# Patient Record
Sex: MALE | Race: WHITE | NOT HISPANIC OR LATINO | Employment: OTHER | URBAN - METROPOLITAN AREA
[De-identification: names, ages, dates, MRNs, and addresses within clinical notes are randomized per-mention and may not be internally consistent; named-entity substitution may affect disease eponyms.]

---

## 2017-01-11 ENCOUNTER — GENERIC CONVERSION - ENCOUNTER (OUTPATIENT)
Dept: OTHER | Facility: OTHER | Age: 72
End: 2017-01-11

## 2017-01-18 ENCOUNTER — HOSPITAL ENCOUNTER (OUTPATIENT)
Dept: RADIOLOGY | Facility: HOSPITAL | Age: 72
Discharge: HOME | End: 2017-01-18
Payer: MEDICARE

## 2017-01-18 DIAGNOSIS — Z12.2 SCREENING FOR MALIGNANT NEOPLASM OF RESPIRATORY ORGAN: ICD-10-CM

## 2017-01-20 DIAGNOSIS — R26.89 OTHER ABNORMALITIES OF GAIT AND MOBILITY: ICD-10-CM

## 2017-01-23 ENCOUNTER — APPOINTMENT (OUTPATIENT)
Dept: PHYSICAL THERAPY | Facility: CLINIC | Age: 72
End: 2017-01-23
Payer: MEDICARE

## 2017-01-23 PROCEDURE — 97162 PT EVAL MOD COMPLEX 30 MIN: CPT

## 2017-01-23 PROCEDURE — G8979 MOBILITY GOAL STATUS: HCPCS

## 2017-01-23 PROCEDURE — G8978 MOBILITY CURRENT STATUS: HCPCS

## 2017-01-25 ENCOUNTER — GENERIC CONVERSION - ENCOUNTER (OUTPATIENT)
Dept: OTHER | Facility: OTHER | Age: 72
End: 2017-01-25

## 2017-01-26 ENCOUNTER — APPOINTMENT (OUTPATIENT)
Dept: PHYSICAL THERAPY | Facility: CLINIC | Age: 72
End: 2017-01-26
Payer: MEDICARE

## 2017-01-26 PROCEDURE — 97112 NEUROMUSCULAR REEDUCATION: CPT

## 2017-01-26 PROCEDURE — 97110 THERAPEUTIC EXERCISES: CPT

## 2017-01-31 ENCOUNTER — APPOINTMENT (OUTPATIENT)
Dept: PHYSICAL THERAPY | Facility: CLINIC | Age: 72
End: 2017-01-31
Payer: MEDICARE

## 2017-01-31 PROCEDURE — 97110 THERAPEUTIC EXERCISES: CPT

## 2017-01-31 PROCEDURE — 97112 NEUROMUSCULAR REEDUCATION: CPT

## 2017-02-02 ENCOUNTER — APPOINTMENT (OUTPATIENT)
Dept: PHYSICAL THERAPY | Facility: CLINIC | Age: 72
End: 2017-02-02
Payer: MEDICARE

## 2017-02-02 PROCEDURE — 97112 NEUROMUSCULAR REEDUCATION: CPT

## 2017-02-02 PROCEDURE — 97110 THERAPEUTIC EXERCISES: CPT

## 2017-02-07 ENCOUNTER — APPOINTMENT (OUTPATIENT)
Dept: PHYSICAL THERAPY | Facility: CLINIC | Age: 72
End: 2017-02-07
Payer: MEDICARE

## 2017-02-07 PROCEDURE — 97110 THERAPEUTIC EXERCISES: CPT

## 2017-02-07 PROCEDURE — 97112 NEUROMUSCULAR REEDUCATION: CPT

## 2017-02-09 ENCOUNTER — APPOINTMENT (OUTPATIENT)
Dept: PHYSICAL THERAPY | Facility: CLINIC | Age: 72
End: 2017-02-09
Payer: MEDICARE

## 2017-02-09 PROCEDURE — 97112 NEUROMUSCULAR REEDUCATION: CPT

## 2017-02-09 PROCEDURE — 97110 THERAPEUTIC EXERCISES: CPT

## 2017-02-14 ENCOUNTER — APPOINTMENT (OUTPATIENT)
Dept: PHYSICAL THERAPY | Facility: CLINIC | Age: 72
End: 2017-02-14
Payer: MEDICARE

## 2017-02-14 PROCEDURE — 97112 NEUROMUSCULAR REEDUCATION: CPT

## 2017-02-14 PROCEDURE — 97110 THERAPEUTIC EXERCISES: CPT

## 2017-02-16 ENCOUNTER — APPOINTMENT (OUTPATIENT)
Dept: PHYSICAL THERAPY | Facility: CLINIC | Age: 72
End: 2017-02-16
Payer: MEDICARE

## 2017-02-16 PROCEDURE — 97112 NEUROMUSCULAR REEDUCATION: CPT

## 2017-02-16 PROCEDURE — G8979 MOBILITY GOAL STATUS: HCPCS

## 2017-02-16 PROCEDURE — G8978 MOBILITY CURRENT STATUS: HCPCS

## 2017-02-16 PROCEDURE — 97110 THERAPEUTIC EXERCISES: CPT

## 2017-02-21 ENCOUNTER — APPOINTMENT (OUTPATIENT)
Dept: PHYSICAL THERAPY | Facility: CLINIC | Age: 72
End: 2017-02-21
Payer: MEDICARE

## 2017-02-23 ENCOUNTER — APPOINTMENT (OUTPATIENT)
Dept: PHYSICAL THERAPY | Facility: CLINIC | Age: 72
End: 2017-02-23
Payer: MEDICARE

## 2017-02-23 PROCEDURE — G8979 MOBILITY GOAL STATUS: HCPCS

## 2017-02-23 PROCEDURE — 97110 THERAPEUTIC EXERCISES: CPT

## 2017-02-23 PROCEDURE — G8980 MOBILITY D/C STATUS: HCPCS

## 2017-02-23 PROCEDURE — 97112 NEUROMUSCULAR REEDUCATION: CPT

## 2017-03-08 RX ORDER — AZELASTINE 1 MG/ML
1 SPRAY, METERED NASAL 2 TIMES DAILY
COMMUNITY
End: 2018-02-14

## 2017-03-08 RX ORDER — AMLODIPINE BESYLATE 5 MG/1
5 TABLET ORAL EVERY MORNING
COMMUNITY
End: 2018-02-01 | Stop reason: SDUPTHER

## 2017-03-08 RX ORDER — TAMSULOSIN HYDROCHLORIDE 0.4 MG/1
0.4 CAPSULE ORAL
COMMUNITY
End: 2018-07-28 | Stop reason: SDUPTHER

## 2017-03-08 RX ORDER — ALBUTEROL SULFATE 90 UG/1
2 AEROSOL, METERED RESPIRATORY (INHALATION) EVERY 6 HOURS PRN
COMMUNITY
End: 2018-01-31 | Stop reason: SDUPTHER

## 2017-03-09 ENCOUNTER — ANESTHESIA EVENT (OUTPATIENT)
Dept: GASTROENTEROLOGY | Facility: AMBULARY SURGERY CENTER | Age: 72
End: 2017-03-09
Payer: MEDICARE

## 2017-03-09 RX ORDER — SODIUM CHLORIDE, SODIUM LACTATE, POTASSIUM CHLORIDE, CALCIUM CHLORIDE 600; 310; 30; 20 MG/100ML; MG/100ML; MG/100ML; MG/100ML
125 INJECTION, SOLUTION INTRAVENOUS CONTINUOUS
Status: CANCELLED | OUTPATIENT
Start: 2017-03-09

## 2017-03-10 ENCOUNTER — ANESTHESIA (OUTPATIENT)
Dept: GASTROENTEROLOGY | Facility: AMBULARY SURGERY CENTER | Age: 72
End: 2017-03-10
Payer: MEDICARE

## 2017-03-10 ENCOUNTER — HOSPITAL ENCOUNTER (OUTPATIENT)
Facility: AMBULARY SURGERY CENTER | Age: 72
Setting detail: OUTPATIENT SURGERY
Discharge: HOME | End: 2017-03-10
Attending: INTERNAL MEDICINE | Admitting: INTERNAL MEDICINE
Payer: MEDICARE

## 2017-03-10 ENCOUNTER — GENERIC CONVERSION - ENCOUNTER (OUTPATIENT)
Dept: OTHER | Facility: OTHER | Age: 72
End: 2017-03-10

## 2017-03-10 VITALS
WEIGHT: 175 LBS | RESPIRATION RATE: 18 BRPM | TEMPERATURE: 97.3 F | SYSTOLIC BLOOD PRESSURE: 144 MMHG | DIASTOLIC BLOOD PRESSURE: 77 MMHG | OXYGEN SATURATION: 96 % | BODY MASS INDEX: 20.66 KG/M2 | HEIGHT: 77 IN | HEART RATE: 61 BPM

## 2017-03-10 DIAGNOSIS — K63.5 POLYP OF COLON: ICD-10-CM

## 2017-03-10 DIAGNOSIS — K21.9 GASTRO-ESOPHAGEAL REFLUX DISEASE WITHOUT ESOPHAGITIS: ICD-10-CM

## 2017-03-10 PROCEDURE — 88305 TISSUE EXAM BY PATHOLOGIST: CPT | Performed by: INTERNAL MEDICINE

## 2017-03-10 RX ORDER — LIDOCAINE HYDROCHLORIDE 20 MG/ML
INJECTION, SOLUTION EPIDURAL; INFILTRATION; INTRACAUDAL; PERINEURAL AS NEEDED
Status: DISCONTINUED | OUTPATIENT
Start: 2017-03-10 | End: 2017-03-10 | Stop reason: SURG

## 2017-03-10 RX ORDER — PROPOFOL 10 MG/ML
INJECTION, EMULSION INTRAVENOUS AS NEEDED
Status: DISCONTINUED | OUTPATIENT
Start: 2017-03-10 | End: 2017-03-10 | Stop reason: SURG

## 2017-03-10 RX ORDER — SODIUM CHLORIDE, SODIUM LACTATE, POTASSIUM CHLORIDE, CALCIUM CHLORIDE 600; 310; 30; 20 MG/100ML; MG/100ML; MG/100ML; MG/100ML
75 INJECTION, SOLUTION INTRAVENOUS CONTINUOUS
Status: DISCONTINUED | OUTPATIENT
Start: 2017-03-10 | End: 2017-03-10 | Stop reason: SDUPTHER

## 2017-03-10 RX ORDER — SODIUM CHLORIDE, SODIUM LACTATE, POTASSIUM CHLORIDE, CALCIUM CHLORIDE 600; 310; 30; 20 MG/100ML; MG/100ML; MG/100ML; MG/100ML
100 INJECTION, SOLUTION INTRAVENOUS CONTINUOUS
Status: DISCONTINUED | OUTPATIENT
Start: 2017-03-10 | End: 2017-03-10 | Stop reason: HOSPADM

## 2017-03-10 RX ADMIN — PROPOFOL 20 MG: 10 INJECTION, EMULSION INTRAVENOUS at 10:17

## 2017-03-10 RX ADMIN — PROPOFOL 20 MG: 10 INJECTION, EMULSION INTRAVENOUS at 10:23

## 2017-03-10 RX ADMIN — LIDOCAINE HYDROCHLORIDE 60 MG: 20 INJECTION, SOLUTION EPIDURAL; INFILTRATION; INTRACAUDAL; PERINEURAL at 10:04

## 2017-03-10 RX ADMIN — PROPOFOL 20 MG: 10 INJECTION, EMULSION INTRAVENOUS at 10:06

## 2017-03-10 RX ADMIN — PROPOFOL 120 MG: 10 INJECTION, EMULSION INTRAVENOUS at 10:04

## 2017-03-10 RX ADMIN — PROPOFOL 20 MG: 10 INJECTION, EMULSION INTRAVENOUS at 10:11

## 2017-03-10 RX ADMIN — PROPOFOL 20 MG: 10 INJECTION, EMULSION INTRAVENOUS at 10:15

## 2017-03-10 RX ADMIN — PROPOFOL 20 MG: 10 INJECTION, EMULSION INTRAVENOUS at 10:18

## 2017-03-10 RX ADMIN — PROPOFOL 20 MG: 10 INJECTION, EMULSION INTRAVENOUS at 10:20

## 2017-03-10 RX ADMIN — PROPOFOL 20 MG: 10 INJECTION, EMULSION INTRAVENOUS at 10:09

## 2017-03-10 RX ADMIN — SODIUM CHLORIDE, SODIUM LACTATE, POTASSIUM CHLORIDE, AND CALCIUM CHLORIDE 75 ML/HR: .6; .31; .03; .02 INJECTION, SOLUTION INTRAVENOUS at 09:57

## 2017-03-10 RX ADMIN — PROPOFOL 20 MG: 10 INJECTION, EMULSION INTRAVENOUS at 10:22

## 2017-03-10 RX ADMIN — PROPOFOL 20 MG: 10 INJECTION, EMULSION INTRAVENOUS at 10:13

## 2017-03-10 RX ADMIN — PROPOFOL 20 MG: 10 INJECTION, EMULSION INTRAVENOUS at 10:12

## 2017-03-10 RX ADMIN — PROPOFOL 20 MG: 10 INJECTION, EMULSION INTRAVENOUS at 10:07

## 2017-03-19 ENCOUNTER — GENERIC CONVERSION - ENCOUNTER (OUTPATIENT)
Dept: OTHER | Facility: OTHER | Age: 72
End: 2017-03-19

## 2017-03-21 ENCOUNTER — ALLSCRIPTS OFFICE VISIT (OUTPATIENT)
Dept: OTHER | Facility: OTHER | Age: 72
End: 2017-03-21

## 2017-04-25 ENCOUNTER — ALLSCRIPTS OFFICE VISIT (OUTPATIENT)
Dept: OTHER | Facility: OTHER | Age: 72
End: 2017-04-25

## 2017-04-28 ENCOUNTER — ALLSCRIPTS OFFICE VISIT (OUTPATIENT)
Dept: OTHER | Facility: OTHER | Age: 72
End: 2017-04-28

## 2017-05-09 ENCOUNTER — ALLSCRIPTS OFFICE VISIT (OUTPATIENT)
Dept: OTHER | Facility: OTHER | Age: 72
End: 2017-05-09

## 2017-06-28 ENCOUNTER — HOSPITAL ENCOUNTER (OUTPATIENT)
Dept: ULTRASOUND IMAGING | Facility: HOSPITAL | Age: 72
Discharge: HOME | End: 2017-06-28
Payer: MEDICARE

## 2017-06-28 ENCOUNTER — TRANSCRIBE ORDERS (OUTPATIENT)
Dept: ADMINISTRATIVE | Facility: HOSPITAL | Age: 72
End: 2017-06-28

## 2017-06-28 DIAGNOSIS — I82.401 ACUTE DEEP VEIN THROMBOSIS (DVT) OF RIGHT LOWER EXTREMITY, UNSPECIFIED VEIN (HCC): Primary | ICD-10-CM

## 2017-06-28 DIAGNOSIS — I82.401 ACUTE DEEP VEIN THROMBOSIS (DVT) OF RIGHT LOWER EXTREMITY, UNSPECIFIED VEIN (HCC): ICD-10-CM

## 2017-06-28 PROCEDURE — 93971 EXTREMITY STUDY: CPT

## 2017-06-29 ENCOUNTER — ALLSCRIPTS OFFICE VISIT (OUTPATIENT)
Dept: OTHER | Facility: OTHER | Age: 72
End: 2017-06-29

## 2017-07-18 ENCOUNTER — APPOINTMENT (OUTPATIENT)
Dept: LAB | Facility: HOSPITAL | Age: 72
End: 2017-07-18
Payer: MEDICARE

## 2017-07-18 ENCOUNTER — HOSPITAL ENCOUNTER (OUTPATIENT)
Dept: RADIOLOGY | Facility: HOSPITAL | Age: 72
Discharge: HOME | End: 2017-07-18
Payer: MEDICARE

## 2017-07-18 ENCOUNTER — TRANSCRIBE ORDERS (OUTPATIENT)
Dept: ADMINISTRATIVE | Facility: HOSPITAL | Age: 72
End: 2017-07-18

## 2017-07-18 ENCOUNTER — ALLSCRIPTS OFFICE VISIT (OUTPATIENT)
Dept: OTHER | Facility: OTHER | Age: 72
End: 2017-07-18

## 2017-07-18 DIAGNOSIS — R01.1 CARDIAC MURMUR: ICD-10-CM

## 2017-07-18 DIAGNOSIS — L03.119 CELLULITIS OF UPPER ARM: Primary | ICD-10-CM

## 2017-07-18 DIAGNOSIS — L03.119 CELLULITIS OF EXTREMITY: ICD-10-CM

## 2017-07-18 DIAGNOSIS — L03.119 CELLULITIS OF UPPER ARM: ICD-10-CM

## 2017-07-18 LAB
ANION GAP SERPL CALCULATED.3IONS-SCNC: 8 MMOL/L (ref 4–13)
BUN SERPL-MCNC: 13 MG/DL (ref 5–25)
CALCIUM SERPL-MCNC: 9.3 MG/DL (ref 8.3–10.1)
CHLORIDE SERPL-SCNC: 106 MMOL/L (ref 100–108)
CO2 SERPL-SCNC: 27 MMOL/L (ref 21–32)
CREAT SERPL-MCNC: 1.28 MG/DL (ref 0.6–1.3)
ERYTHROCYTE [DISTWIDTH] IN BLOOD BY AUTOMATED COUNT: 13.7 % (ref 11.6–15.1)
ERYTHROCYTE [SEDIMENTATION RATE] IN BLOOD: 7 MM/HOUR (ref 2–10)
GFR SERPL CREATININE-BSD FRML MDRD: 55.1 ML/MIN/1.73SQ M
GLUCOSE P FAST SERPL-MCNC: 106 MG/DL (ref 65–99)
HCT VFR BLD AUTO: 42 % (ref 42–52)
HGB BLD-MCNC: 14.4 G/DL (ref 14–18)
MCH RBC QN AUTO: 32.9 PG (ref 27–31)
MCHC RBC AUTO-ENTMCNC: 34.2 G/DL (ref 31.4–37.4)
MCV RBC AUTO: 96 FL (ref 82–98)
PLATELET # BLD AUTO: 239 THOUSANDS/UL (ref 130–400)
PMV BLD AUTO: 6 FL (ref 8.9–12.7)
POTASSIUM SERPL-SCNC: 3.7 MMOL/L (ref 3.5–5.3)
RBC # BLD AUTO: 4.37 MILLION/UL (ref 4.7–6.1)
SODIUM SERPL-SCNC: 141 MMOL/L (ref 136–145)
URATE SERPL-MCNC: 6.8 MG/DL (ref 4.2–8)
WBC # BLD AUTO: 8 THOUSAND/UL (ref 4.8–10.8)

## 2017-07-18 PROCEDURE — 80048 BASIC METABOLIC PNL TOTAL CA: CPT

## 2017-07-18 PROCEDURE — 85652 RBC SED RATE AUTOMATED: CPT

## 2017-07-18 PROCEDURE — 84550 ASSAY OF BLOOD/URIC ACID: CPT

## 2017-07-18 PROCEDURE — 73610 X-RAY EXAM OF ANKLE: CPT

## 2017-07-18 PROCEDURE — 36415 COLL VENOUS BLD VENIPUNCTURE: CPT

## 2017-07-18 PROCEDURE — 73630 X-RAY EXAM OF FOOT: CPT

## 2017-07-18 PROCEDURE — 85027 COMPLETE CBC AUTOMATED: CPT

## 2017-07-24 ENCOUNTER — TRANSCRIBE ORDERS (OUTPATIENT)
Dept: ADMINISTRATIVE | Facility: HOSPITAL | Age: 72
End: 2017-07-24

## 2017-07-24 ENCOUNTER — ALLSCRIPTS OFFICE VISIT (OUTPATIENT)
Dept: OTHER | Facility: OTHER | Age: 72
End: 2017-07-24

## 2017-07-24 DIAGNOSIS — R01.1 MURMUR: Primary | ICD-10-CM

## 2017-07-26 ENCOUNTER — GENERIC CONVERSION - ENCOUNTER (OUTPATIENT)
Dept: OTHER | Facility: OTHER | Age: 72
End: 2017-07-26

## 2017-07-31 ENCOUNTER — HOSPITAL ENCOUNTER (OUTPATIENT)
Dept: NON INVASIVE DIAGNOSTICS | Facility: HOSPITAL | Age: 72
Discharge: HOME | End: 2017-07-31
Payer: MEDICARE

## 2017-07-31 DIAGNOSIS — R01.1 CARDIAC MURMUR: ICD-10-CM

## 2017-07-31 PROCEDURE — 93306 TTE W/DOPPLER COMPLETE: CPT

## 2017-08-08 ENCOUNTER — ALLSCRIPTS OFFICE VISIT (OUTPATIENT)
Dept: OTHER | Facility: OTHER | Age: 72
End: 2017-08-08

## 2017-10-26 ENCOUNTER — GENERIC CONVERSION - ENCOUNTER (OUTPATIENT)
Dept: OTHER | Facility: OTHER | Age: 72
End: 2017-10-26

## 2017-11-09 ENCOUNTER — GENERIC CONVERSION - ENCOUNTER (OUTPATIENT)
Dept: OTHER | Facility: OTHER | Age: 72
End: 2017-11-09

## 2017-11-22 ENCOUNTER — GENERIC CONVERSION - ENCOUNTER (OUTPATIENT)
Dept: OTHER | Facility: OTHER | Age: 72
End: 2017-11-22

## 2017-12-14 ENCOUNTER — TRANSCRIBE ORDERS (OUTPATIENT)
Dept: ADMINISTRATIVE | Facility: HOSPITAL | Age: 72
End: 2017-12-14

## 2017-12-14 DIAGNOSIS — Z12.2 SCREENING FOR MALIGNANT NEOPLASM OF RESPIRATORY ORGAN: Primary | ICD-10-CM

## 2017-12-29 ENCOUNTER — HOSPITAL ENCOUNTER (INPATIENT)
Facility: HOSPITAL | Age: 72
LOS: 4 days | Discharge: HOME | DRG: 190 | End: 2018-01-03
Attending: EMERGENCY MEDICINE | Admitting: FAMILY MEDICINE
Payer: MEDICARE

## 2017-12-29 ENCOUNTER — APPOINTMENT (EMERGENCY)
Dept: RADIOLOGY | Facility: HOSPITAL | Age: 72
DRG: 190 | End: 2017-12-29
Payer: MEDICARE

## 2017-12-29 DIAGNOSIS — B33.8 RSV INFECTION: ICD-10-CM

## 2017-12-29 DIAGNOSIS — R14.0 ABDOMINAL DISTENSION: ICD-10-CM

## 2017-12-29 DIAGNOSIS — E88.01 AAT (ALPHA-1-ANTITRYPSIN) DEFICIENCY (HCC): ICD-10-CM

## 2017-12-29 DIAGNOSIS — J44.1 COPD EXACERBATION (HCC): Primary | ICD-10-CM

## 2017-12-29 LAB
ALBUMIN SERPL BCP-MCNC: 3.5 G/DL (ref 3.5–5)
ALP SERPL-CCNC: 76 U/L (ref 46–116)
ALT SERPL W P-5'-P-CCNC: 32 U/L (ref 12–78)
ANION GAP SERPL CALCULATED.3IONS-SCNC: 9 MMOL/L (ref 4–13)
APTT PPP: 25 SECONDS (ref 24–33)
AST SERPL W P-5'-P-CCNC: 29 U/L (ref 5–45)
ATRIAL RATE: 90 BPM
BASOPHILS # BLD AUTO: 0 THOUSANDS/ΜL (ref 0–0.1)
BASOPHILS NFR BLD AUTO: 0 % (ref 0–1)
BILIRUB SERPL-MCNC: 1.5 MG/DL (ref 0.2–1)
BUN SERPL-MCNC: 14 MG/DL (ref 5–25)
CALCIUM SERPL-MCNC: 8.9 MG/DL (ref 8.3–10.1)
CHLORIDE SERPL-SCNC: 104 MMOL/L (ref 100–108)
CO2 SERPL-SCNC: 24 MMOL/L (ref 21–32)
CREAT SERPL-MCNC: 1.39 MG/DL (ref 0.6–1.3)
EOSINOPHIL # BLD AUTO: 0.3 THOUSAND/ΜL (ref 0–0.61)
EOSINOPHIL NFR BLD AUTO: 4 % (ref 0–6)
ERYTHROCYTE [DISTWIDTH] IN BLOOD BY AUTOMATED COUNT: 13.6 % (ref 11.6–15.1)
GFR SERPL CREATININE-BSD FRML MDRD: 50 ML/MIN/1.73SQ M
GLUCOSE SERPL-MCNC: 118 MG/DL (ref 65–140)
HCT VFR BLD AUTO: 41.8 % (ref 42–52)
HGB BLD-MCNC: 14.4 G/DL (ref 14–18)
INR PPP: 1.07 (ref 0.86–1.16)
L PNEUMO1 AG UR QL IA.RAPID: NEGATIVE
LACTATE SERPL-SCNC: 2 MMOL/L (ref 0.5–2)
LACTATE SERPL-SCNC: 3 MMOL/L (ref 0.5–2)
LYMPHOCYTES # BLD AUTO: 1 THOUSANDS/ΜL (ref 0.6–4.47)
LYMPHOCYTES NFR BLD AUTO: 15 % (ref 14–44)
MCH RBC QN AUTO: 33.4 PG (ref 27–31)
MCHC RBC AUTO-ENTMCNC: 34.3 G/DL (ref 31.4–37.4)
MCV RBC AUTO: 97 FL (ref 82–98)
MONOCYTES # BLD AUTO: 0.5 THOUSAND/ΜL (ref 0.17–1.22)
MONOCYTES NFR BLD AUTO: 8 % (ref 4–12)
NEUTROPHILS # BLD AUTO: 5 THOUSANDS/ΜL (ref 1.85–7.62)
NEUTS SEG NFR BLD AUTO: 74 % (ref 43–75)
NRBC BLD AUTO-RTO: 0 /100 WBCS
NT-PROBNP SERPL-MCNC: 249 PG/ML
P AXIS: 49 DEGREES
PLATELET # BLD AUTO: 210 THOUSANDS/UL (ref 130–400)
PMV BLD AUTO: 6.4 FL (ref 8.9–12.7)
POTASSIUM SERPL-SCNC: 4.3 MMOL/L (ref 3.5–5.3)
PR INTERVAL: 170 MS
PROT SERPL-MCNC: 7.3 G/DL (ref 6.4–8.2)
PROTHROMBIN TIME: 11.2 SECONDS (ref 9.4–11.7)
QRS AXIS: 50 DEGREES
QRSD INTERVAL: 98 MS
QT INTERVAL: 354 MS
QTC INTERVAL: 433 MS
RBC # BLD AUTO: 4.3 MILLION/UL (ref 4.7–6.1)
S PNEUM AG UR QL: NEGATIVE
SODIUM SERPL-SCNC: 137 MMOL/L (ref 136–145)
T WAVE AXIS: 15 DEGREES
TROPONIN I SERPL-MCNC: <0.02 NG/ML
VENTRICULAR RATE: 90 BPM
WBC # BLD AUTO: 6.8 THOUSAND/UL (ref 4.8–10.8)

## 2017-12-29 PROCEDURE — 87798 DETECT AGENT NOS DNA AMP: CPT | Performed by: STUDENT IN AN ORGANIZED HEALTH CARE EDUCATION/TRAINING PROGRAM

## 2017-12-29 PROCEDURE — 94664 DEMO&/EVAL PT USE INHALER: CPT

## 2017-12-29 PROCEDURE — 94640 AIRWAY INHALATION TREATMENT: CPT

## 2017-12-29 PROCEDURE — 94760 N-INVAS EAR/PLS OXIMETRY 1: CPT

## 2017-12-29 PROCEDURE — 83605 ASSAY OF LACTIC ACID: CPT | Performed by: EMERGENCY MEDICINE

## 2017-12-29 PROCEDURE — 85610 PROTHROMBIN TIME: CPT | Performed by: EMERGENCY MEDICINE

## 2017-12-29 PROCEDURE — 87040 BLOOD CULTURE FOR BACTERIA: CPT | Performed by: EMERGENCY MEDICINE

## 2017-12-29 PROCEDURE — 94668 MNPJ CHEST WALL SBSQ: CPT

## 2017-12-29 PROCEDURE — 96361 HYDRATE IV INFUSION ADD-ON: CPT

## 2017-12-29 PROCEDURE — 87081 CULTURE SCREEN ONLY: CPT | Performed by: FAMILY MEDICINE

## 2017-12-29 PROCEDURE — 83880 ASSAY OF NATRIURETIC PEPTIDE: CPT | Performed by: EMERGENCY MEDICINE

## 2017-12-29 PROCEDURE — 85730 THROMBOPLASTIN TIME PARTIAL: CPT | Performed by: EMERGENCY MEDICINE

## 2017-12-29 PROCEDURE — 85025 COMPLETE CBC W/AUTO DIFF WBC: CPT | Performed by: EMERGENCY MEDICINE

## 2017-12-29 PROCEDURE — 93005 ELECTROCARDIOGRAM TRACING: CPT | Performed by: EMERGENCY MEDICINE

## 2017-12-29 PROCEDURE — 36415 COLL VENOUS BLD VENIPUNCTURE: CPT | Performed by: EMERGENCY MEDICINE

## 2017-12-29 PROCEDURE — 84484 ASSAY OF TROPONIN QUANT: CPT | Performed by: EMERGENCY MEDICINE

## 2017-12-29 PROCEDURE — 94644 CONT INHLJ TX 1ST HOUR: CPT

## 2017-12-29 PROCEDURE — 87449 NOS EACH ORGANISM AG IA: CPT | Performed by: STUDENT IN AN ORGANIZED HEALTH CARE EDUCATION/TRAINING PROGRAM

## 2017-12-29 PROCEDURE — 99285 EMERGENCY DEPT VISIT HI MDM: CPT

## 2017-12-29 PROCEDURE — 71010 HB  X-RAY EXAM CHEST 1 VIEW (PORTABLE): CPT

## 2017-12-29 PROCEDURE — 80053 COMPREHEN METABOLIC PANEL: CPT | Performed by: EMERGENCY MEDICINE

## 2017-12-29 PROCEDURE — 96374 THER/PROPH/DIAG INJ IV PUSH: CPT

## 2017-12-29 PROCEDURE — 93005 ELECTROCARDIOGRAM TRACING: CPT

## 2017-12-29 RX ORDER — SODIUM CHLORIDE 9 MG/ML
75 INJECTION, SOLUTION INTRAVENOUS CONTINUOUS
Status: DISCONTINUED | OUTPATIENT
Start: 2017-12-29 | End: 2018-01-03 | Stop reason: HOSPADM

## 2017-12-29 RX ORDER — ACETAMINOPHEN 325 MG/1
650 TABLET ORAL EVERY 6 HOURS PRN
Status: DISCONTINUED | OUTPATIENT
Start: 2017-12-29 | End: 2018-01-03 | Stop reason: HOSPADM

## 2017-12-29 RX ORDER — AMLODIPINE BESYLATE 5 MG/1
5 TABLET ORAL EVERY MORNING
Status: DISCONTINUED | OUTPATIENT
Start: 2017-12-30 | End: 2018-01-03 | Stop reason: HOSPADM

## 2017-12-29 RX ORDER — TAMSULOSIN HYDROCHLORIDE 0.4 MG/1
0.4 CAPSULE ORAL
Status: DISCONTINUED | OUTPATIENT
Start: 2017-12-29 | End: 2018-01-03 | Stop reason: HOSPADM

## 2017-12-29 RX ORDER — METHYLPREDNISOLONE SODIUM SUCCINATE 40 MG/ML
20 INJECTION, POWDER, LYOPHILIZED, FOR SOLUTION INTRAMUSCULAR; INTRAVENOUS EVERY 8 HOURS SCHEDULED
Status: DISCONTINUED | OUTPATIENT
Start: 2017-12-29 | End: 2017-12-31

## 2017-12-29 RX ORDER — IPRATROPIUM BROMIDE AND ALBUTEROL SULFATE 2.5; .5 MG/3ML; MG/3ML
3 SOLUTION RESPIRATORY (INHALATION) EVERY 2 HOUR PRN
Status: DISCONTINUED | OUTPATIENT
Start: 2017-12-29 | End: 2018-01-03 | Stop reason: HOSPADM

## 2017-12-29 RX ORDER — MAGNESIUM SULFATE HEPTAHYDRATE 40 MG/ML
2 INJECTION, SOLUTION INTRAVENOUS ONCE
Status: COMPLETED | OUTPATIENT
Start: 2017-12-29 | End: 2017-12-29

## 2017-12-29 RX ORDER — METHYLPREDNISOLONE SODIUM SUCCINATE 125 MG/2ML
125 INJECTION, POWDER, LYOPHILIZED, FOR SOLUTION INTRAMUSCULAR; INTRAVENOUS ONCE
Status: COMPLETED | OUTPATIENT
Start: 2017-12-29 | End: 2017-12-29

## 2017-12-29 RX ORDER — IPRATROPIUM BROMIDE AND ALBUTEROL SULFATE 2.5; .5 MG/3ML; MG/3ML
3 SOLUTION RESPIRATORY (INHALATION) ONCE
Status: COMPLETED | OUTPATIENT
Start: 2017-12-29 | End: 2017-12-29

## 2017-12-29 RX ORDER — PANTOPRAZOLE SODIUM 20 MG/1
20 TABLET, DELAYED RELEASE ORAL
Status: DISCONTINUED | OUTPATIENT
Start: 2017-12-30 | End: 2018-01-03 | Stop reason: HOSPADM

## 2017-12-29 RX ORDER — IPRATROPIUM BROMIDE AND ALBUTEROL SULFATE 2.5; .5 MG/3ML; MG/3ML
3 SOLUTION RESPIRATORY (INHALATION)
Status: DISCONTINUED | OUTPATIENT
Start: 2017-12-29 | End: 2018-01-03 | Stop reason: HOSPADM

## 2017-12-29 RX ADMIN — ALBUTEROL SULFATE 10 MG: 2.5 SOLUTION RESPIRATORY (INHALATION) at 11:34

## 2017-12-29 RX ADMIN — IPRATROPIUM BROMIDE AND ALBUTEROL SULFATE 3 ML: .5; 3 SOLUTION RESPIRATORY (INHALATION) at 16:54

## 2017-12-29 RX ADMIN — METHYLPREDNISOLONE SODIUM SUCCINATE 20 MG: 40 INJECTION, POWDER, FOR SOLUTION INTRAMUSCULAR; INTRAVENOUS at 15:53

## 2017-12-29 RX ADMIN — IPRATROPIUM BROMIDE AND ALBUTEROL SULFATE 3 ML: .5; 3 SOLUTION RESPIRATORY (INHALATION) at 23:35

## 2017-12-29 RX ADMIN — SODIUM CHLORIDE 75 ML/HR: 0.9 INJECTION, SOLUTION INTRAVENOUS at 15:49

## 2017-12-29 RX ADMIN — ENOXAPARIN SODIUM 40 MG: 40 INJECTION SUBCUTANEOUS at 15:54

## 2017-12-29 RX ADMIN — METHYLPREDNISOLONE SODIUM SUCCINATE 125 MG: 125 INJECTION, POWDER, FOR SOLUTION INTRAMUSCULAR; INTRAVENOUS at 10:16

## 2017-12-29 RX ADMIN — AZITHROMYCIN MONOHYDRATE 500 MG: 500 INJECTION, POWDER, LYOPHILIZED, FOR SOLUTION INTRAVENOUS at 14:30

## 2017-12-29 RX ADMIN — MAGNESIUM SULFATE HEPTAHYDRATE 2 G: 40 INJECTION, SOLUTION INTRAVENOUS at 14:31

## 2017-12-29 RX ADMIN — IPRATROPIUM BROMIDE AND ALBUTEROL SULFATE 3 ML: .5; 3 SOLUTION RESPIRATORY (INHALATION) at 10:06

## 2017-12-29 RX ADMIN — IPRATROPIUM BROMIDE AND ALBUTEROL SULFATE 3 ML: .5; 3 SOLUTION RESPIRATORY (INHALATION) at 20:28

## 2017-12-29 RX ADMIN — METHYLPREDNISOLONE SODIUM SUCCINATE 20 MG: 40 INJECTION, POWDER, FOR SOLUTION INTRAMUSCULAR; INTRAVENOUS at 21:24

## 2017-12-29 RX ADMIN — SODIUM CHLORIDE 1000 ML: 0.9 INJECTION, SOLUTION INTRAVENOUS at 11:15

## 2017-12-29 RX ADMIN — TAMSULOSIN HYDROCHLORIDE 0.4 MG: 0.4 CAPSULE ORAL at 21:24

## 2017-12-29 RX ADMIN — AZITHROMYCIN MONOHYDRATE 500 MG: 500 INJECTION, POWDER, LYOPHILIZED, FOR SOLUTION INTRAVENOUS at 23:02

## 2017-12-29 NOTE — ED PROVIDER NOTES
History  Chief Complaint   Patient presents with    Shortness of Breath     short of breath , worse for the past 2 days  rib/chest pain      Patient presents for evaluation of dyspnea with non productive cough for couple days  No fever or chest pain  History provided by:  Patient   used: No    Shortness of Breath   Associated symptoms: cough    Associated symptoms: no chest pain and no fever        Prior to Admission Medications   Prescriptions Last Dose Informant Patient Reported? Taking?    Alpha1-Proteinase Inhibitor (ZEMAIRA IV)   Yes No   Sig: Infuse into a venous catheter once a week On tuesday -1000mg /50 ml    Omeprazole Magnesium (PRILOSEC OTC PO)   Yes No   Sig: Take 20 mg by mouth every morning   albuterol (PROVENTIL HFA,VENTOLIN HFA) 90 mcg/act inhaler   Yes No   Sig: Inhale 2 puffs every 6 (six) hours as needed for wheezing   amLODIPine (NORVASC) 5 mg tablet   Yes No   Sig: Take 5 mg by mouth every morning   azelastine (ASTELIN) 0 1 % nasal spray   Yes No   Si spray into each nostril 2 (two) times a day Use in each nostril as directed   diphenhydrAMINE (SOMINEX) 25 MG tablet   Yes No   Sig: Take 25 mg by mouth daily at bedtime as needed for sleep   fluticasone-salmeterol (ADVAIR) 250-50 mcg/dose inhaler   Yes No   Sig: Inhale 1 puff every 12 (twelve) hours   tamsulosin (FLOMAX) 0 4 mg   Yes No   Sig: Take 0 4 mg by mouth daily at bedtime   tiotropium (SPIRIVA HANDIHALER) 18 mcg inhalation capsule   Yes No   Sig: Place 18 mcg into inhaler and inhale every morning      Facility-Administered Medications: None       Past Medical History:   Diagnosis Date    Anesthesia complication     Difficult to wake up    Arthritis     BPH (benign prostatic hyperplasia)     urinary frequency    Cancer (HCC)     basal cell neck, face    COPD (chronic obstructive pulmonary disease) (HCC)     Full dentures     Hypertension     controlled    Kidney stone     at least 7 episodes    Liver disease     Alpha 1- enzyme deficiency    Pulmonary emphysema ([address] )     Wears glasses     for driving only       Past Surgical History:   Procedure Laterality Date    BACK SURGERY  2008    discectomy    COLONOSCOPY      COLONOSCOPY N/A 3/10/2017    Procedure: Trini Harrell;  Surgeon: Raven Mccullough MD;  [facility]; Service:    Annette Beltran      removal of kidney stones    ESOPHAGOGASTRODUODENOSCOPY N/A 3/10/2017    Procedure: ESOPHAGOGASTRODUODENOSCOPY (EGD); Surgeon: Raven Mccullough MD;  Location: [facility] GI LAB; Service:     LITHOTRIPSY      TONSILLECTOMY      VEIN LIGATION AND STRIPPING Bilateral     17's       Family History   Problem Relation Age of Onset    Cancer Father     Cancer Brother      colon     I have reviewed and agree with the history as documented  Social History   Substance Use Topics    Smoking status: Former Smoker     Packs/day: 0 00     Years: 60 00    Smokeless tobacco: Never Used      Comment: quit in august 2017    Alcohol use No        Review of Systems   Constitutional: Negative for fever  Respiratory: Positive for cough and shortness of breath  Cardiovascular: Negative for chest pain  All other systems reviewed and are negative        Physical Exam  ED Triage Vitals   Temperature Pulse Respirations Blood Pressure SpO2   12/29/17 0956 12/29/17 0959 12/29/17 0959 12/29/17 0959 12/29/17 0959   97 7 °F (36 5 °C) 98 (!) 24 (!) 172/70 95 %      Temp Source Heart Rate Source Patient Position - Orthostatic VS BP Location FiO2 (%)   12/29/17 0956 12/29/17 0959 12/29/17 0959 12/29/17 0959 --   Oral Monitor Sitting Right arm       Pain Score       12/29/17 0959       2           Orthostatic Vital Signs  Vitals:    12/29/17 0959 12/29/17 1230 12/29/17 1424 12/29/17 1513   BP: (!) 172/70 143/73 153/83 152/75   Pulse: 98 99 100 105   Patient Position - Orthostatic VS: Sitting Lying  Lying       Physical Exam   Constitutional: He is oriented to person, place, and time  He appears distressed  HENT:   Mouth/Throat: Oropharynx is clear and moist    Eyes: Pupils are equal, round, and reactive to light  Neck: Normal range of motion  Neck supple  Cardiovascular: Normal rate, regular rhythm and intact distal pulses  Pulmonary/Chest: He is in respiratory distress  He has wheezes  Mild respiratory distress with tachypnea and increased work of breathing, wheezing diffusely  Abdominal: Soft  Bowel sounds are normal  There is no tenderness  Musculoskeletal: Normal range of motion  Neurological: He is alert and oriented to person, place, and time  Skin: Capillary refill takes less than 2 seconds  He is not diaphoretic  Nursing note and vitals reviewed        ED Medications  Medications   amLODIPine (NORVASC) tablet 5 mg (not administered)   pantoprazole (PROTONIX) EC tablet 20 mg (not administered)   tamsulosin (FLOMAX) capsule 0 4 mg (not administered)   ipratropium-albuterol (DUO-NEB) 0 5-2 5 mg/mL inhalation solution 3 mL (not administered)   ipratropium-albuterol (DUO-NEB) 0 5-2 5 mg/mL inhalation solution 3 mL (not administered)   methylPREDNISolone sodium succinate (Solu-MEDROL) injection 20 mg (20 mg Intravenous Given 12/29/17 1553)   sodium chloride 0 9 % infusion (75 mL/hr Intravenous New Bag 12/29/17 1549)   enoxaparin (LOVENOX) subcutaneous injection 40 mg (40 mg Subcutaneous Given 12/29/17 1554)   acetaminophen (TYLENOL) tablet 650 mg (not administered)   azithromycin (ZITHROMAX) 500 mg in sodium chloride 0 9% 250mL IVPB 500 mg (not administered)   ipratropium-albuterol (DUO-NEB) 0 5-2 5 mg/mL inhalation solution 3 mL (3 mL Nebulization Given 12/29/17 1006)   ipratropium-albuterol (DUO-NEB) 0 5-2 5 mg/mL inhalation solution 3 mL (3 mL Nebulization Given 12/29/17 1006)   methylPREDNISolone sodium succinate (Solu-MEDROL) injection 125 mg (125 mg Intravenous Given 12/29/17 1016)   sodium chloride 0 9 % bolus 1,000 mL (0 mL Intravenous Stopped 12/29/17 1215)   albuterol inhalation solution 10 mg (10 mg Nebulization Given 12/29/17 1134)   azithromycin (ZITHROMAX) 500 mg in sodium chloride 0 9% 250mL IVPB 500 mg (0 mg Intravenous Stopped 12/29/17 1550)   magnesium sulfate 2 g/50 mL IVPB (premix) 2 g (0 g Intravenous Stopped 12/29/17 1544)       Diagnostic Studies  Results Reviewed     Procedure Component Value Units Date/Time    Lactic acid, plasma [[id number]]  (Normal) Collected:  12/29/17 1325    Lab Status:  Final result Specimen:  Blood from Arm, Left Updated:  12/29/17 1352     LACTIC ACID 2 0 mmol/L     Narrative:         Result may be elevated if tourniquet was used during collection  Lactic acid, plasma [[id number]]  (Abnormal) Collected:  12/29/17 1014    Lab Status:  Final result Specimen:  Blood from Arm, Right Updated:  12/29/17 1112     LACTIC ACID 3 0 (HH) mmol/L     Narrative:         Result may be elevated if tourniquet was used during collection  Comprehensive metabolic panel [[id number]]  (Abnormal) Collected:  12/29/17 1014    Lab Status:  Final result Specimen:  Blood from Arm, Right Updated:  12/29/17 1051     Sodium 137 mmol/L      Potassium 4 3 mmol/L      Chloride 104 mmol/L      CO2 24 mmol/L      Anion Gap 9 mmol/L      BUN 14 mg/dL      Creatinine 1 39 (H) mg/dL      Glucose 118 mg/dL      Calcium 8 9 mg/dL      AST 29 U/L      ALT 32 U/L      Alkaline Phosphatase 76 U/L      Total Protein 7 3 g/dL      Albumin 3 5 g/dL      Total Bilirubin 1 50 (H) mg/dL      eGFR 50 ml/min/1 73sq m     Narrative:         National Kidney Disease Education Program recommendations are as follows:  GFR calculation is accurate only with a steady state creatinine  Chronic Kidney disease less than 60 ml/min/1 73 sq  meters  Kidney failure less than 15 ml/min/1 73 sq  meters      B-type natriuretic peptide [[id number]]  (Abnormal) Collected:  12/29/17 1014    Lab Status:  Final result Specimen:  Blood from Arm, Right Updated:  12/29/17 1051     NT-proBNP 249 (H) pg/mL     Troponin I [[id number]]  (Normal) Collected:  12/29/17 1014    Lab Status:  Final result Specimen:  Blood from Arm, Right Updated:  12/29/17 1049     Troponin I <0 02 ng/mL     Narrative:         [employer] Chemistry analyzer 99% cutoff is > 0 04 ng/mL in network labs    o cTnI 99% cutoff is useful only when applied to patients in the clinical setting of myocardial ischemia  o cTnI 99% cutoff should be interpreted in the context of clinical history, ECG findings and possibly cardiac imaging to establish correct diagnosis  o cTnI 99% cutoff may be suggestive but clearly not indicative of a coronary event without the clinical setting of myocardial ischemia  APTT [[id number]]  (Normal) Collected:  12/29/17 1014    Lab Status:  Final result Specimen:  Blood from Arm, Right Updated:  12/29/17 1046     PTT 25 seconds     Narrative: Therapeutic Heparin Range = 60-90 seconds    Protime-INR [[id number]]  (Normal) Collected:  12/29/17 1014    Lab Status:  Final result Specimen:  Blood from Arm, Right Updated:  12/29/17 1046     Protime 11 2 seconds      INR 1 07    CBC and differential [[id number]]  (Abnormal) Collected:  12/29/17 1014    Lab Status:  Final result Specimen:  Blood from Arm, Right Updated:  12/29/17 1026     WBC 6 80 Thousand/uL      RBC 4 30 (L) Million/uL      Hemoglobin 14 4 g/dL      Hematocrit 41 8 (L) %      MCV 97 fL      MCH 33 4 (H) pg      MCHC 34 3 g/dL      RDW 13 6 %      MPV 6 4 (L) fL      Platelets 121 Thousands/uL      nRBC 0 /100 WBCs      Neutrophils Relative 74 %      Lymphocytes Relative 15 %      Monocytes Relative 8 %      Eosinophils Relative 4 %      Basophils Relative 0 %      Neutrophils Absolute 5 00 Thousands/µL      Lymphocytes Absolute 1 00 Thousands/µL      Monocytes Absolute 0 50 Thousand/µL      Eosinophils Absolute 0 30 Thousand/µL      Basophils Absolute 0 00 Thousands/µL     Blood culture #2 [[id number]] Collected:  12/29/17 1013    Lab Status:   In process Specimen:  Blood from Hand, Right Updated:  12/29/17 1025    Blood culture #1 [[id number]] Collected:  12/29/17 1010    Lab Status: In process Specimen:  Blood from Arm, Right Updated:  12/29/17 1024                 XR chest 1 view portable   Final Result by Edmundo Brewster MD (12/29 1045)      Chronic changes  Scarring or atelectasis at the right lung base  Workstation performed: [id number]                    Procedures  Procedures       Phone Contacts  ED Phone Contact    ED Course  ED Course                                MDM  Number of Diagnoses or Management Options  COPD exacerbation [facility]):   Diagnosis management comments: Pulse ox 92% on NC indicating adequate oxygenation  CXR: NAD as read by me    After treatment patient felt better but not at baseline, and still increased respiratory effort  Amount and/or Complexity of Data Reviewed  Clinical lab tests: ordered and reviewed  Tests in the radiology section of CPT®: ordered and reviewed  Decide to obtain previous medical records or to obtain history from someone other than the patient: yes  Review and summarize past medical records: yes  Discuss the patient with other providers: yes  Independent visualization of images, tracings, or specimens: yes    Patient Progress  Patient progress: stable    CritCare Time    Disposition  Final diagnoses:   COPD exacerbation ([facility] [address] )     Time reflects when diagnosis was documented in both MDM as applicable and the Disposition within this note     Time User Action Codes Description Comment    12/29/2017  1:46 PM Severo, Jamie Baker [J44 1] COPD exacerbation [facility])       ED Disposition     ED Disposition Condition Comment    Admit  Case was discussed with Dr Chauncey Gordon and the patient's admission status was agreed to be observation to the service of Dr Oscar Reeder          Follow-up Information    None       Current Discharge Medication List      CONTINUE these medications which have NOT CHANGED    Details albuterol (PROVENTIL HFA,VENTOLIN HFA) 90 mcg/act inhaler Inhale 2 puffs every 6 (six) hours as needed for wheezing      Alpha1-Proteinase Inhibitor (ZEMAIRA IV) Infuse into a venous catheter once a week On tuesday -1000mg /50 ml       amLODIPine (NORVASC) 5 mg tablet Take 5 mg by mouth every morning      azelastine (ASTELIN) 0 1 % nasal spray 1 spray into each nostril 2 (two) times a day Use in each nostril as directed      diphenhydrAMINE (SOMINEX) 25 MG tablet Take 25 mg by mouth daily at bedtime as needed for sleep      fluticasone-salmeterol (ADVAIR) 250-50 mcg/dose inhaler Inhale 1 puff every 12 (twelve) hours      Omeprazole Magnesium (PRILOSEC OTC PO) Take 20 mg by mouth every morning      tamsulosin (FLOMAX) 0 4 mg Take 0 4 mg by mouth daily at bedtime      tiotropium (SPIRIVA HANDIHALER) 18 mcg inhalation capsule Place 18 mcg into inhaler and inhale every morning           No discharge procedures on file      ED Provider  Electronically Signed by           Pollo Patiño DO  12/29/17 8030

## 2017-12-29 NOTE — ED NOTES
Pt noted finishing neb  Sat on neb 97%  Pt removed from neb, sat 93%  Pt noted with moist non productive cough  Pt noted with WALKER with moving self in bed and slightly labored breath at rest  Pt able to complete sentences without interuption  Dr Raeann Mora in to see pt, will do additional neb        Cherie Srinivasan, RN  12/29/17 Mandi Burgess 139, RN  12/29/17 1128

## 2017-12-29 NOTE — PLAN OF CARE

## 2017-12-29 NOTE — H&P
H&P Exam - Navya Urias 67 y o  male MRN: [id number]  Unit/Bed#: [facility] Encounter: [id number]      89 year old males with a PMH of Alpha-1 Antitrypsin deficiency and COPD presents with shortness of breath  Patient will be admitted to the medical/surgical floor and will be placed on observational status under the service of Dr Krystal Hung  Patient is expected to stay less than 2 midnights  Assessment/Plan:    Shortness of Breath:  · Likely 2/2 COPD Exacerbation vs  Viral Tracheobronchitis vs  PNA  · Afebrile on admission / RR on admission: 24 / SpO2 on admission: 93 / WBC on admission: 6 8  · CXR on admission:  Chronic change, scarring and atelectasis at right lung base  · Maintain O2 Saturation greater than 92%  · Does not use Home Oxygen  · BNP: 249  · Troponin: Negx1  · Obtain Urine Legionella/ Urine Strep  Pneumoniae / Influenza A/B and RSV by PCR  · Obtain Blood Cx x2  · Start Dounebs q4h/q2h  · Start Solumedrol 20 q8h  · Continue Azithromycin 500mg IV q24h    Alpha-1 antitrypsin deficiency:  · Stable; will hold home medication: Zemaira  · Last Dose: 12/26/17; receives treatment once a week every Tuesday    COPD:  · Management as above  · Will hold home medication:  Advair, Spiriva, and Albuterol    Hypertension:  · BP on admission: 172/70  · Current BP: 153/83  · Stable; continue home medication: Amlodipine 5mg PO qd morning    GERD:  · Stable; continue home medication: Omeprazole 20mg PO qd    Benign Prostatic Hypertrophy:  · Stable; continue home medication: Flomax 0 4mg PO qd bedtime    Global:  · Regular Diet + IV NS 75cc/hr / Replete Electrolytes as Needed / Lovenox + SCDs        Plan Discussed and Agreed upon with Attending Physician on-call  History of Present Illness   Manuel Perrin is a 67year old male with a PMH of BPH, GERD, Hypertension, Alpha-1 Antitrypsin Deficiency, and COPD who presents to the ED with a chief complaint shortness of breath    As per patient, has been ongoing for quite some time now  Does not note any inciting events  Over the last 2 days, states that his breathing has gotten progressively worse  He notes to be weak, dizzy, and lightheaded  Patient also states a dry, moist, nonproductive cough  His breathing is worse on exertion and improves at rest/laying flat  Patient has not found relief with use of his home inhalers: Advair, Spiriva, and Albuterol  Patient notes a history of COPD  Does not use home oxygen  Former 1 PPD smoker  Quit in August 2017  Denies any fever, chills, chest pain, abdominal pain, nausea, vomiting, lower extremity swelling, sick contacts, and history of travel  ED Course:  Patient arrived in the ED  Given bolus NS, DuoNeb x3, Solumedrol 125mg, and Zithromax 500mg IV  CXR obtained  Patient stabilized and moved to the medical floor  Review of Systems   Constitutional: Positive for fatigue  Negative for activity change, appetite change, chills, diaphoresis, fever and unexpected weight change  HENT: Positive for sinus pressure  Negative for congestion, drooling, ear pain, postnasal drip, rhinorrhea, sinus pain, sneezing and sore throat  Eyes: Negative for visual disturbance  Respiratory: Positive for cough, chest tightness and shortness of breath  Negative for apnea and stridor  Cardiovascular: Negative for chest pain, palpitations and leg swelling  Gastrointestinal: Positive for diarrhea  Negative for abdominal distention, abdominal pain, blood in stool, constipation, nausea and vomiting  Chronic in [place]   Genitourinary: Positive for decreased urine volume, difficulty urinating and urgency  Negative for flank pain and frequency  Musculoskeletal: Negative for arthralgias, gait problem and myalgias  Neurological: Positive for dizziness, weakness and light-headedness  Negative for syncope, numbness and headaches  Psychiatric/Behavioral: Negative for agitation, behavioral problems and confusion         Historical Information   Past Medical History:   Diagnosis Date    Anesthesia complication     Difficult to wake up    Arthritis     BPH (benign prostatic hyperplasia)     urinary frequency    Cancer (HCC)     basal cell neck, face    COPD (chronic obstructive pulmonary disease) (HCC)     Full dentures     Hypertension     controlled    Kidney stone     at least 7 episodes    Liver disease     Alpha 1- enzyme deficiency    Pulmonary emphysema ([address] )     Wears glasses     for driving only     Past Surgical History:   Procedure Laterality Date    BACK SURGERY  2008    discectomy    COLONOSCOPY      COLONOSCOPY N/A 3/10/2017    Procedure: Denny Lara;  Surgeon: Jerilyn Rose MD;  Location: [facility] GI [facility]; Service:    Carlos Eduardo Davis      removal of kidney stones    ESOPHAGOGASTRODUODENOSCOPY N/A 3/10/2017    Procedure: ESOPHAGOGASTRODUODENOSCOPY (EGD); Surgeon: Jerilyn Rose MD;  Location: [facility] GI [facility]; Service:     LITHOTRIPSY      TONSILLECTOMY      VEIN LIGATION AND STRIPPING Bilateral     1980's     Social History   History   Alcohol Use No     History   Drug Use No     History   Smoking Status    Former Smoker    Packs/day: 0 00    Years: 60 00   Smokeless Tobacco    Never Used     Comment: quit in august 2017     Family History:   Family History   Problem Relation Age of Onset    Cancer Father     Cancer Brother      colon       Meds/Allergies   PTA meds:   Prior to Admission Medications   Prescriptions Last Dose Informant Patient Reported? Taking?    Alpha1-Proteinase Inhibitor (ZEMAIRA IV)   Yes No   Sig: Infuse into a venous catheter once a week On tuesday -1000mg /50 ml    Omeprazole Magnesium (PRILOSEC OTC PO)   Yes No   Sig: Take 20 mg by mouth every morning   albuterol (PROVENTIL HFA,VENTOLIN HFA) 90 mcg/act inhaler   Yes No   Sig: Inhale 2 puffs every 6 (six) hours as needed for wheezing   amLODIPine (NORVASC) 5 mg tablet   Yes No   Sig: Take 5 mg by mouth every morning   azelastine (ASTELIN) 0 1 % nasal spray   Yes No   Si spray into each nostril 2 (two) times a day Use in each nostril as directed   diphenhydrAMINE (SOMINEX) 25 MG tablet   Yes No   Sig: Take 25 mg by mouth daily at bedtime as needed for sleep   fluticasone-salmeterol (ADVAIR) 250-50 mcg/dose inhaler   Yes No   Sig: Inhale 1 puff every 12 (twelve) hours   tamsulosin (FLOMAX) 0 4 mg   Yes No   Sig: Take 0 4 mg by mouth daily at bedtime   tiotropium (SPIRIVA HANDIHALER) 18 mcg inhalation capsule   Yes No   Sig: Place 18 mcg into inhaler and inhale every morning      Facility-Administered Medications: None     Allergies   Allergen Reactions    Chantix [Varenicline]      Caused prostate infection       Objective   First Vitals:   Blood Pressure: (!) 172/70 (17)  Pulse: 98 (17)  Temperature: 97 7 °F (36 5 °C) (17)  Temp Source: Oral (17)  Respirations: (!) 24 (17)  Height: 6' 5" (195 6 cm) (17)  Weight - Scale: 81 6 kg (180 lb) (17)  SpO2: 95 % (17)    Current Vitals:   Blood Pressure: 153/83 (17 1424)  Pulse: 100 (17 1424)  Temperature: 97 7 °F (36 5 °C) (17)  Temp Source: Oral (17)  Respirations: (!) 24 (17)  Height: 6' 5" (195 6 cm) (17)  Weight - Scale: 81 6 kg (180 lb) (1759)  SpO2: 93 % (17 1424)      Intake/Output Summary (Last 24 hours) at 17 1444  Last data filed at 17 1430   Gross per 24 hour   Intake             1000 ml   Output                0 ml   Net             1000 ml       Invasive Devices     Peripheral Intravenous Line            Peripheral IV 17 Right Wrist less than 1 day                Physical Exam   Constitutional: He is oriented to person, place, and time  He appears well-developed and well-nourished  No distress  HENT:   Head: Atraumatic  Eyes: Conjunctivae and EOM are normal  Pupils are equal, round, and reactive to light  Neck: Normal range of motion  Neck supple  No JVD present  No tracheal deviation present  No thyromegaly present  Cardiovascular: Normal rate, regular rhythm, normal heart sounds and intact distal pulses  Exam reveals no gallop and no friction rub  No murmur heard  Pulmonary/Chest: No stridor  No respiratory distress  He has wheezes  He has no rales  He exhibits no tenderness  Abdominal Breathing  B/L Course Breath Sounds  Faint End-Expiratory Wheeze at RML/RLL  No Accessory Muscle Use  No Pursed Lip Breathing  No Intercostal Retractions  No Perioral Cyanosis   Abdominal: Soft  Bowel sounds are normal  He exhibits no distension and no mass  There is no tenderness  There is no rebound and no guarding  Musculoskeletal: Normal range of motion  He exhibits no edema, tenderness or deformity  Lymphadenopathy:     He has no cervical adenopathy  Neurological: He is alert and oriented to person, place, and time  Skin: He is not diaphoretic  Psychiatric: He has a normal mood and affect         Lab Results:  Results for orders placed or performed during the hospital encounter of 12/29/17   CBC and differential   Result Value Ref Range    WBC 6 80 4 80 - 10 80 Thousand/uL    RBC 4 30 (L) 4 70 - 6 10 Million/uL    Hemoglobin 14 4 14 0 - 18 0 g/dL    Hematocrit 41 8 (L) 42 0 - 52 0 %    MCV 97 82 - 98 fL    MCH 33 4 (H) 27 0 - 31 0 pg    MCHC 34 3 31 4 - 37 4 g/dL    RDW 13 6 11 6 - 15 1 %    MPV 6 4 (L) 8 9 - 12 7 fL    Platelets 492 430 - 583 Thousands/uL    nRBC 0 /100 WBCs    Neutrophils Relative 74 43 - 75 %    Lymphocytes Relative 15 14 - 44 %    Monocytes Relative 8 4 - 12 %    Eosinophils Relative 4 0 - 6 %    Basophils Relative 0 0 - 1 %    Neutrophils Absolute 5 00 1 85 - 7 62 Thousands/µL    Lymphocytes Absolute 1 00 0 60 - 4 47 Thousands/µL    Monocytes Absolute 0 50 0 17 - 1 22 Thousand/µL    Eosinophils Absolute 0 30 0 00 - 0 61 Thousand/µL    Basophils Absolute 0 00 0 00 - 0 10 Thousands/µL Protime-INR   Result Value Ref Range    Protime 11 2 9 4 - 11 7 seconds    INR 1 07 0 86 - 1 16   APTT   Result Value Ref Range    PTT 25 24 - 33 seconds   Comprehensive metabolic panel   Result Value Ref Range    Sodium 137 136 - 145 mmol/L    Potassium 4 3 3 5 - 5 3 mmol/L    Chloride 104 100 - 108 mmol/L    CO2 24 21 - 32 mmol/L    Anion Gap 9 4 - 13 mmol/L    BUN 14 5 - 25 mg/dL    Creatinine 1 39 (H) 0 60 - 1 30 mg/dL    Glucose 118 65 - 140 mg/dL    Calcium 8 9 8 3 - 10 1 mg/dL    AST 29 5 - 45 U/L    ALT 32 12 - 78 U/L    Alkaline Phosphatase 76 46 - 116 U/L    Total Protein 7 3 6 4 - 8 2 g/dL    Albumin 3 5 3 5 - 5 0 g/dL    Total Bilirubin 1 50 (H) 0 20 - 1 00 mg/dL    eGFR 50 ml/min/1 73sq m   Troponin I   Result Value Ref Range    Troponin I <0 02 <=0 04 ng/mL   B-type natriuretic peptide   Result Value Ref Range    NT-proBNP 249 (H) <125 pg/mL   Lactic acid, plasma   Result Value Ref Range    LACTIC ACID 3 0 (HH) 0 5 - 2 0 mmol/L   Lactic acid, plasma   Result Value Ref Range    LACTIC ACID 2 0 0 5 - 2 0 mmol/L   ECG 12 lead   Result Value Ref Range    Ventricular Rate 90 BPM    Atrial Rate 90 BPM    OK Interval 170 ms    QRSD Interval 98 ms    QT Interval 354 ms    QTC Interval 433 ms    P Axis 49 degrees    QRS Axis 50 degrees    T Wave Axis 15 degrees       Imaging:  XR chest 1 view portable   Final Result      Chronic changes  Scarring or atelectasis at the right lung base  Workstation performed: [id number]             EKG, Pathology, and Other Studies: NSR, PVCs, Non-Specific ST Segments    Code Status: Full    Counseling / Coordination of Care: Total floor / unit time spent today 30 minutes             Deborah Chavez MD

## 2017-12-29 NOTE — ED PROCEDURE NOTE
PROCEDURE  ECG 12 Lead Documentation  Date/Time: 12/29/2017 10:04 AM  Performed by: Deion Quiroz by: David Quiet     ECG reviewed by me, the ED Provider: yes    Patient location:  ED  Interpretation:     Interpretation: abnormal    Rate:     ECG rate:  90    ECG rate assessment: normal    Rhythm:     Rhythm: sinus rhythm    Ectopy:     Ectopy: PVCs    QRS:     QRS axis:  Normal  ST segments:     ST segments:  Non-specific    Elevation:  V4, V5 and V6  T waves:     T waves: normal

## 2017-12-29 NOTE — PLAN OF CARE
DISCHARGE PLANNING     Discharge to home or other facility with appropriate resources Progressing        INFECTION - ADULT     Absence or prevention of progression during hospitalization Progressing        Knowledge Deficit     Patient/family/caregiver demonstrates understanding of disease process, treatment plan, medications, and discharge instructions Progressing        RESPIRATORY - ADULT     Achieves optimal ventilation and oxygenation Progressing

## 2017-12-29 NOTE — ED NOTES
Dr Gail Chaudhari aware of sat, and neb continues  No distress noted  Pt repositions self in bed         Sacha Thomas, RN  12/29/17 9963

## 2017-12-29 NOTE — SOCIAL WORK
DASH discussion completed  Discussed goals of making sure pt's needs are met upon discharge, pt's preferences are taken into account, pt understands her health condition, medications and symptoms to watch for after returning home and pt is aware of any follow up appointments recommended by hospital physician  SPOKE WITH PT AT THE BEDSIDE  PT STATED THAT HE LIVES ALONE  HE HAS A CANE WHICH HE BARELY USES AND NOTED NO HHC EXCEPT AN INFUSION NURSE THAT COMES WEEKLY FROM [facility]  PT DENIES HAVING OXYGEN, BUT NOTES HAVING A NEB MACHINE AT HOME    PT DOES DRIVE AND USES [facility] IN [place], [place]

## 2017-12-30 ENCOUNTER — APPOINTMENT (OUTPATIENT)
Dept: RADIOLOGY | Facility: HOSPITAL | Age: 72
DRG: 190 | End: 2017-12-30
Payer: MEDICARE

## 2017-12-30 PROBLEM — R06.02 SOB (SHORTNESS OF BREATH): Status: ACTIVE | Noted: 2017-12-30

## 2017-12-30 PROBLEM — N40.0 BPH (BENIGN PROSTATIC HYPERPLASIA): Chronic | Status: ACTIVE | Noted: 2017-12-30

## 2017-12-30 PROBLEM — I10 HTN (HYPERTENSION): Status: ACTIVE | Noted: 2017-12-30

## 2017-12-30 PROBLEM — K21.9 GASTROESOPHAGEAL REFLUX DISEASE: Status: ACTIVE | Noted: 2017-12-30

## 2017-12-30 PROBLEM — G57.70 CAUSALGIA OF LOWER LIMB: Status: ACTIVE | Noted: 2017-12-30

## 2017-12-30 PROBLEM — E88.01 AAT (ALPHA-1-ANTITRYPSIN) DEFICIENCY (HCC): Status: ACTIVE | Noted: 2017-12-30

## 2017-12-30 PROBLEM — J44.9 CHRONIC OBSTRUCTIVE PULMONARY DISEASE (HCC): Status: ACTIVE | Noted: 2017-12-30

## 2017-12-30 PROBLEM — J44.1 COPD EXACERBATION (HCC): Status: ACTIVE | Noted: 2017-12-30

## 2017-12-30 PROBLEM — I10 UNSPECIFIED ESSENTIAL HYPERTENSION: Status: ACTIVE | Noted: 2017-12-30

## 2017-12-30 LAB
ANION GAP SERPL CALCULATED.3IONS-SCNC: 11 MMOL/L (ref 4–13)
BUN SERPL-MCNC: 13 MG/DL (ref 5–25)
CALCIUM SERPL-MCNC: 8.4 MG/DL (ref 8.3–10.1)
CHLORIDE SERPL-SCNC: 106 MMOL/L (ref 100–108)
CO2 SERPL-SCNC: 22 MMOL/L (ref 21–32)
CREAT SERPL-MCNC: 1.11 MG/DL (ref 0.6–1.3)
ERYTHROCYTE [DISTWIDTH] IN BLOOD BY AUTOMATED COUNT: 13.5 % (ref 11.6–15.1)
FLUAV AG SPEC QL: ABNORMAL
FLUBV AG SPEC QL: ABNORMAL
GFR SERPL CREATININE-BSD FRML MDRD: 66 ML/MIN/1.73SQ M
GLUCOSE SERPL-MCNC: 131 MG/DL (ref 65–140)
HCT VFR BLD AUTO: 38.7 % (ref 42–52)
HGB BLD-MCNC: 13.2 G/DL (ref 14–18)
MAGNESIUM SERPL-MCNC: 1.9 MG/DL (ref 1.6–2.6)
MCH RBC QN AUTO: 32.9 PG (ref 27–31)
MCHC RBC AUTO-ENTMCNC: 34.2 G/DL (ref 31.4–37.4)
MCV RBC AUTO: 96 FL (ref 82–98)
PHOSPHATE SERPL-MCNC: 2.6 MG/DL (ref 2.3–4.1)
PLATELET # BLD AUTO: 204 THOUSANDS/UL (ref 130–400)
PMV BLD AUTO: 6 FL (ref 8.9–12.7)
POTASSIUM SERPL-SCNC: 4.1 MMOL/L (ref 3.5–5.3)
RBC # BLD AUTO: 4.01 MILLION/UL (ref 4.7–6.1)
RSV B RNA SPEC QL NAA+PROBE: DETECTED
SODIUM SERPL-SCNC: 139 MMOL/L (ref 136–145)
WBC # BLD AUTO: 5.7 THOUSAND/UL (ref 4.8–10.8)

## 2017-12-30 PROCEDURE — 94640 AIRWAY INHALATION TREATMENT: CPT

## 2017-12-30 PROCEDURE — 83735 ASSAY OF MAGNESIUM: CPT | Performed by: STUDENT IN AN ORGANIZED HEALTH CARE EDUCATION/TRAINING PROGRAM

## 2017-12-30 PROCEDURE — 94668 MNPJ CHEST WALL SBSQ: CPT

## 2017-12-30 PROCEDURE — 94760 N-INVAS EAR/PLS OXIMETRY 1: CPT

## 2017-12-30 PROCEDURE — 85027 COMPLETE CBC AUTOMATED: CPT | Performed by: STUDENT IN AN ORGANIZED HEALTH CARE EDUCATION/TRAINING PROGRAM

## 2017-12-30 PROCEDURE — 80048 BASIC METABOLIC PNL TOTAL CA: CPT | Performed by: STUDENT IN AN ORGANIZED HEALTH CARE EDUCATION/TRAINING PROGRAM

## 2017-12-30 PROCEDURE — 71020 HB X-RAY EXAM CHEST 2 VIEWS: CPT

## 2017-12-30 PROCEDURE — 84100 ASSAY OF PHOSPHORUS: CPT | Performed by: STUDENT IN AN ORGANIZED HEALTH CARE EDUCATION/TRAINING PROGRAM

## 2017-12-30 RX ORDER — GUAIFENESIN 100 MG/5ML
200 SOLUTION ORAL EVERY 4 HOURS PRN
Status: DISCONTINUED | OUTPATIENT
Start: 2017-12-30 | End: 2017-12-31

## 2017-12-30 RX ORDER — LANOLIN ALCOHOL/MO/W.PET/CERES
3 CREAM (GRAM) TOPICAL
Status: DISCONTINUED | OUTPATIENT
Start: 2017-12-30 | End: 2018-01-03 | Stop reason: HOSPADM

## 2017-12-30 RX ADMIN — PANTOPRAZOLE SODIUM 20 MG: 20 TABLET, DELAYED RELEASE ORAL at 05:53

## 2017-12-30 RX ADMIN — MELATONIN TAB 3 MG 3 MG: 3 TAB at 22:18

## 2017-12-30 RX ADMIN — AZITHROMYCIN MONOHYDRATE 500 MG: 500 INJECTION, POWDER, LYOPHILIZED, FOR SOLUTION INTRAVENOUS at 23:39

## 2017-12-30 RX ADMIN — METHYLPREDNISOLONE SODIUM SUCCINATE 20 MG: 40 INJECTION, POWDER, FOR SOLUTION INTRAMUSCULAR; INTRAVENOUS at 22:18

## 2017-12-30 RX ADMIN — IPRATROPIUM BROMIDE AND ALBUTEROL SULFATE 3 ML: .5; 3 SOLUTION RESPIRATORY (INHALATION) at 08:03

## 2017-12-30 RX ADMIN — ENOXAPARIN SODIUM 40 MG: 40 INJECTION SUBCUTANEOUS at 08:52

## 2017-12-30 RX ADMIN — IPRATROPIUM BROMIDE AND ALBUTEROL SULFATE 3 ML: .5; 3 SOLUTION RESPIRATORY (INHALATION) at 11:45

## 2017-12-30 RX ADMIN — IPRATROPIUM BROMIDE AND ALBUTEROL SULFATE 3 ML: .5; 3 SOLUTION RESPIRATORY (INHALATION) at 15:23

## 2017-12-30 RX ADMIN — IPRATROPIUM BROMIDE AND ALBUTEROL SULFATE 3 ML: .5; 3 SOLUTION RESPIRATORY (INHALATION) at 23:46

## 2017-12-30 RX ADMIN — GUAIFENESIN 200 MG: 100 SOLUTION ORAL at 13:40

## 2017-12-30 RX ADMIN — GUAIFENESIN 200 MG: 100 SOLUTION ORAL at 22:18

## 2017-12-30 RX ADMIN — TAMSULOSIN HYDROCHLORIDE 0.4 MG: 0.4 CAPSULE ORAL at 22:18

## 2017-12-30 RX ADMIN — IPRATROPIUM BROMIDE AND ALBUTEROL SULFATE 3 ML: .5; 3 SOLUTION RESPIRATORY (INHALATION) at 04:14

## 2017-12-30 RX ADMIN — IPRATROPIUM BROMIDE AND ALBUTEROL SULFATE 3 ML: .5; 3 SOLUTION RESPIRATORY (INHALATION) at 20:25

## 2017-12-30 RX ADMIN — AMLODIPINE BESYLATE 5 MG: 5 TABLET ORAL at 08:52

## 2017-12-30 RX ADMIN — METHYLPREDNISOLONE SODIUM SUCCINATE 20 MG: 40 INJECTION, POWDER, FOR SOLUTION INTRAMUSCULAR; INTRAVENOUS at 05:52

## 2017-12-30 RX ADMIN — METHYLPREDNISOLONE SODIUM SUCCINATE 20 MG: 40 INJECTION, POWDER, FOR SOLUTION INTRAMUSCULAR; INTRAVENOUS at 13:16

## 2017-12-30 RX ADMIN — SODIUM CHLORIDE 75 ML/HR: 0.9 INJECTION, SOLUTION INTRAVENOUS at 05:52

## 2017-12-30 NOTE — PLAN OF CARE
DISCHARGE PLANNING     Discharge to home or other facility with appropriate resources Progressing        DISCHARGE PLANNING - CARE MANAGEMENT     Discharge to post-acute care or home with appropriate resources Progressing        INFECTION - ADULT     Absence or prevention of progression during hospitalization Progressing        Knowledge Deficit     Patient/family/caregiver demonstrates understanding of disease process, treatment plan, medications, and discharge instructions Progressing        RESPIRATORY - ADULT     Achieves optimal ventilation and oxygenation Progressing

## 2017-12-30 NOTE — PROGRESS NOTES
[address] [facility] [address] [facility] Progress Note - Eric Frank 67 y o  male MRN: [id number]    Unit/Bed#: [address] [place] [address]- Encounter: [id number]      Assessment/Plan:  Shortness of breath likely secondary to CPOD exacerbation vs  PNA  -CXR: chronic changes, scarring, atelectasis ar right lung base  -titrate O2 as needed, maintain sats above 92%  -Blood cultures pending  -Urine legionelle, strep penumo and flu negative  -RSV PCR detected  -duonebs q4h/q2h PRN  -Solumedrol 20 mg q8h  -Azythromycin 500 mg qd  Alpha-1 antitrypsin deficiency  -stable, continue with Zemaira weekly on tuesdays  -last dose 12/26/2017  COPD  -held home medications - advair, Spiriva, and albuterol  HTN  -stable, amlodipine 5mg qd  GERD  -stable, omeprazole 20 mg qd  BPH  -stable, flomax 0 4 mg qd  Global: regular diet, IV NS 75 c/hr, lovenox and SCDs      Subjective:   Patient seen and examined at the bedside  Reports improvement in shortness of breath, although still requiring oxygen  Reports no wheezing  Objective:     Vitals: Blood pressure 160/81, pulse 61, temperature 98 8 °F (37 1 °C), temperature source Tympanic, resp  rate 18, height 6' 6" (1 981 m), weight 80 2 kg (176 lb 14 4 oz), SpO2 95 %  ,Body mass index is 20 44 kg/m²  Wt Readings from Last 3 Encounters:   12/29/17 80 2 kg (176 lb 14 4 oz)   03/08/17 79 4 kg (175 lb)       Intake/Output Summary (Last 24 hours) at 12/30/17 1711  Last data filed at 12/29/17 1755   Gross per 24 hour   Intake              360 ml   Output                0 ml   Net              360 ml       Physical Exam  General: Pt is AAO x 3, not in any acute distress  Cardio: RRR, S1 and S2 +, no murmurs/rubs/gallops/clicks  Resp: CTA b/l, no wheezes/rales/rhonchi  Abdomen: soft, NT/ND, BS+  Extremities: no cyanosis or edema  PP 2+ b/l       Recent Results (from the past 24 hour(s))   Basic metabolic panel    Collection Time: 12/30/17  6:32 AM   Result Value Ref Range    Sodium 139 136 - 145 mmol/L    Potassium 4 1 3 5 - 5 3 mmol/L    Chloride 106 100 - 108 mmol/L    CO2 22 21 - 32 mmol/L    Anion Gap 11 4 - 13 mmol/L    BUN 13 5 - 25 mg/dL    Creatinine 1 11 0 60 - 1 30 mg/dL    Glucose 131 65 - 140 mg/dL    Calcium 8 4 8 3 - 10 1 mg/dL    eGFR 66 ml/min/1 73sq m   Magnesium    Collection Time: 12/30/17  6:32 AM   Result Value Ref Range    Magnesium 1 9 1 6 - 2 6 mg/dL   CBC (With Platelets)    Collection Time: 12/30/17  6:32 AM   Result Value Ref Range    WBC 5 70 4 80 - 10 80 Thousand/uL    RBC 4 01 (L) 4 70 - 6 10 Million/uL    Hemoglobin 13 2 (L) 14 0 - 18 0 g/dL    Hematocrit 38 7 (L) 42 0 - 52 0 %    MCV 96 82 - 98 fL    MCH 32 9 (H) 27 0 - 31 0 pg    MCHC 34 2 31 4 - 37 4 g/dL    RDW 13 5 11 6 - 15 1 %    Platelets 805 425 - 889 Thousands/uL    MPV 6 0 (L) 8 9 - 12 7 fL   Phosphorus    Collection Time: 12/30/17  6:32 AM   Result Value Ref Range    Phosphorus 2 6 2 3 - 4 1 mg/dL       Current Facility-Administered Medications   Medication Dose Route Frequency Provider Last Rate Last Dose    acetaminophen (TYLENOL) tablet 650 mg  650 mg Oral Q6H PRN Rozina Givens MD        amLODIPine (NORVASC) tablet 5 mg  5 mg Oral QAM Rozina Givens MD   5 mg at 12/30/17 0852    azithromycin (ZITHROMAX) 500 mg in sodium chloride 0 9% 250mL IVPB 500 mg  500 mg Intravenous Q24H Rozina Givens MD   500 mg at 12/29/17 2302    enoxaparin (LOVENOX) subcutaneous injection 40 mg  40 mg Subcutaneous Daily Rozina Givens MD   40 mg at 12/30/17 0852    guaiFENesin (ROBITUSSIN) oral solution 200 mg  200 mg Oral Q4H PRN Claudell Che, MD   200 mg at 12/30/17 1340    ipratropium-albuterol (DUO-NEB) 0 5-2 5 mg/mL inhalation solution 3 mL  3 mL Nebulization Q4H Rozina Givens MD   3 mL at 12/30/17 1523    ipratropium-albuterol (DUO-NEB) 0 5-2 5 mg/mL inhalation solution 3 mL  3 mL Nebulization Q2H PRN Rozina Givens MD        methylPREDNISolone sodium succinate (Solu-MEDROL) injection 20 mg  20 mg Intravenous Q8H Simon Yu MD   20 mg at 12/30/17 1316    pantoprazole (PROTONIX) EC tablet 20 mg  20 mg Oral Early Morning Chen Che MD   20 mg at 12/30/17 0553    sodium chloride 0 9 % infusion  75 mL/hr Intravenous Continuous Chen Che MD 75 mL/hr at 12/30/17 0552 75 mL/hr at 12/30/17 0552    tamsulosin (FLOMAX) capsule 0 4 mg  0 4 mg Oral HS Chen Che MD   0 4 mg at 12/29/17 2124       Invasive Devices     Peripheral Intravenous Line            Peripheral IV 12/29/17 Right Wrist 1 day                Martha Zaman MD     SENIOR RESIDENT NOTE: I have personally seen and examined the patient, and agree with the residents assessment  Earlier this AM, patient reported his breathing has not improved and the neb treatments are not alleviating his symptoms  He reports he desaturated while ambulating to the bathroom last night down to 80's  He appeared tachypnic on exam with wheezing  Patient reported similar details to RT  CXR ordered  During rounds with medical team, patient appeared much more comfortable, SpO2 92-97% on 3L  CTAB  CXR revealed clear lungs  Plan to continue nebs/cst/abx  Possible discharge home tomorrow       Tessa Washington MD , MD

## 2017-12-30 NOTE — CASE MANAGEMENT
Initial Clinical Review    PER PHYSICIAN ADVISOR   12/30/17 2236  Inpatient Admission Once     Transfer Service: General Medicine       Question Answer Comment   Admitting Physician Breanna Notice of Care Med Surg    Estimated length of stay More than 2 Midnights    Certification I certify that inpatient services are medically necessary for this patient for a duration of greater than two midnights  See H&P and MD Progress Notes for additional information about the patient's course of treatment  Admission: Date/Time/Statement: 12/29/17 1348    Orders Placed This Encounter   Procedures    Place in Observation (expected length of stay for this patient is less than two midnights)     Standing Status:   Standing     Number of Occurrences:   1     Order Specific Question:   Admitting Physician     Answer:   Sravanthi Cleary     Order Specific Question:   Level of Care     Answer:   Med Surg [16]         ED: Date/Time/Mode of Arrival:   ED Arrival Information     Expected Arrival Acuity Means of Arrival Escorted By Service Admission Type    - 12/29/2017 09:48 Emergent Walk-In Self General Medicine Emergency    Arrival Complaint    SOB, RIB PAIN          Chief Complaint:   Chief Complaint   Patient presents with    Shortness of Breath     short of breath , worse for the past 2 days  rib/chest pain        History of Illness:    Patient presents for evaluation of dyspnea with non productive cough for couple days  No fever or chest pain    Sat on neb 97%  Pt removed from neb, sat 93%  Pt noted with moist non productive cough    Pt noted with WALKER with moving self in bed and slightly labored breath at rest  Pt able to complete sentences without interuption      ED Vital Signs:   ED Triage Vitals   Temperature Pulse Respirations Blood Pressure SpO2   12/29/17 0956 12/29/17 0959 12/29/17 0959 12/29/17 0959 12/29/17 0959   97 7 °F (36 5 °C) 98 (!) 24 (!) 172/70 95 %      Temp Source Heart Rate Source Patient Position - Orthostatic VS BP Location FiO2 (%)   12/29/17 0956 12/29/17 0959 12/29/17 0959 12/29/17 0959 --   Oral Monitor Sitting Right arm       Pain Score       12/29/17 0959       2        Wt Readings from Last 1 Encounters:   12/29/17 80 2 kg (176 lb 14 4 oz)       Vital Signs (abnormal):     12/29/17 1513  97 9 °F (36 6 °C)  105  22  152/75  92 %  None (Room air)  Lying   12/29/17 1424  --  100   24  153/83  93 %  None (Room air)  --   12/29/17 1230  --  99   28  143/73  91 %  --  Lying   O2 Device: hour long nebulizer  at 12/29/17 1230   12/29/17 1135  --  --  --  --  94 %  Nasal cannula  --   12/29/17 0959  --  98   24   172/70  95 %  None (Room air)  Sittin       2LO2NC     Abnormal Labs/Diagnostic Test Results:     Mild respiratory distress with tachypnea and increased work of breathing, wheezing diffusely  Pulmonary/Chest: No stridor  No respiratory distress  He has wheezes  He has no rales  He exhibits no tenderness  Abdominal Breathing  B/L Course Breath Sounds  Faint End-Expiratory Wheeze at RML/RLL      LACTIC ACID 3 0 (HH) mmol/L     Total Bilirubin 1 50 (H) mg/dL     Creatinine 1 39 (H) mg/dL     NT-proBNP 249 (H) pg/mL     Hematocrit 41 8 (L) %      RSV PCR Detected (A)       BLOOD CULTURE PENDING     XR chest 1 view portable   Final Result  (12/29 1045)       Chronic changes  Scarring or atelectasis at the right lung base           ED Treatment:   Medication Administration from 12/29/2017 0948 to 12/29/2017 1450       Date/Time Order Dose Route     12/29/2017 1006 ipratropium-albuterol (DUO-NEB) 0 5-2 5 mg/mL inhalation solution 3 mL 3 mL Nebulization     12/29/2017 1006 ipratropium-albuterol (DUO-NEB) 0 5-2 5 mg/mL inhalation solution 3 mL 3 mL Nebulization     12/29/2017 1016 methylPREDNISolone sodium succinate (Solu-MEDROL) injection 125 mg 125 mg Intravenous     12/29/2017 1115 sodium chloride 0 9 % bolus 1,000 mL 1,000 mL Intravenous     12/29/2017 1132 albuterol inhalation solution 10 mg 10 mg Nebulization     12/29/2017 1430 azithromycin (ZITHROMAX) 500 mg in sodium chloride 0 9% 250mL IVPB 500 mg 500 mg Intravenous     12/29/2017 1431 magnesium sulfate 2 g/50 mL IVPB (premix) 2 g 2 g Intravenous       Past Medical/Surgical History: Active Ambulatory Problems     Diagnosis Date Noted    Gastroesophageal reflux disease 12/30/2017    Unspecified essential hypertension 12/30/2017       Past Medical History:    Anesthesia complication    Arthritis    BPH (benign prostatic hyperplasia)    [facility] ([facility] )    [facility]    Full dentures    Hypertension    Kidney stone    Liver disease    Pulmonary emphysema ([facility] )    Wears glasses       Admitting Diagnosis: SOB (shortness of breath) [R06 02]  COPD exacerbation ([facility] ) [J44 1]    Age/Sex: 67 y o  male    Assessment/Plan:   Shortness of Breath:  · Likely 2/2 COPD Exacerbation vs  Viral Tracheobronchitis vs  PNA  · Afebrile on admission / RR on admission: 24 / SpO2 on admission: 93 / WBC on admission: 6 8  · CXR on admission:  Chronic change, scarring and atelectasis at right lung base  · Maintain O2 Saturation greater than 92%  ? Does not use Home Oxygen  · BNP: 249  · Troponin: Negx1  · Obtain Urine Legionella/ Urine Strep  Pneumoniae / Influenza A/B and RSV by PCR  · Obtain Blood Cx x2  · Start Dounebs q4h/q2h  · Start Solumedrol 20 q8h  · Continue Azithromycin 500mg IV q24h     Alpha-1 antitrypsin deficiency:  · Stable; will hold home medication: Zemaira  ?  Last Dose: 12/26/17; receives treatment once a week every Tuesday     COPD:  · Management as above  · Will hold home medication:  Advair, Spiriva, and Albuterol     Hypertension:  · BP on admission: 172/70  · Current BP: 153/83  · Stable; continue home medication: Amlodipine 5mg PO qd morning     GERD:  · Stable; continue home medication: Omeprazole 20mg PO qd     Benign Prostatic Hypertrophy:  · Stable; continue home medication: Flomax 0 4mg PO qd bedtime     Global:  · Regular Diet + IV NS 75cc/hr / Replete Electrolytes as Needed / Lovenox + SCDs      SPOKE WITH PT AT THE BEDSIDE  PT STATED THAT HE LIVES ALONE  HE HAS A CANE WHICH HE BARELY USES AND NOTED NO HHC EXCEPT AN INFUSION NURSE THAT COMES WEEKLY FROM [facility]  PT DENIES HAVING OXYGEN, BUT NOTES HAVING A NEB MACHINE AT HOME  PT DOES DRIVE AND USES [facility] [place]       Admission Orders:     OSCILLATORY POSITIVE EXPIRATORY PRESSURE   DROPLET ISOLATION  SEQUENTIAL COMPRESSION DEVICE   2LO2NC       Scheduled Meds:   amLODIPine 5 mg Oral QAM   azithromycin 500 mg Intravenous Q24H   enoxaparin 40 mg Subcutaneous Daily   ipratropium-albuterol 3 mL Nebulization Q4H   methylPREDNISolone sodium succinate 20 mg Intravenous Q8H [address]   pantoprazole 20 mg Oral Early Morning   tamsulosin 0 4 mg Oral HS     Continuous Infusions:   sodium chloride 75 mL/hr Last Rate: 75 mL/hr (12/30/17 0552)     PRN Meds:   acetaminophen    ipratropium-albuterol

## 2017-12-31 PROBLEM — B33.8 RSV INFECTION: Status: ACTIVE | Noted: 2017-12-31

## 2017-12-31 LAB
ANION GAP SERPL CALCULATED.3IONS-SCNC: 7 MMOL/L (ref 4–13)
BUN SERPL-MCNC: 15 MG/DL (ref 5–25)
CALCIUM SERPL-MCNC: 8.2 MG/DL (ref 8.3–10.1)
CHLORIDE SERPL-SCNC: 105 MMOL/L (ref 100–108)
CO2 SERPL-SCNC: 25 MMOL/L (ref 21–32)
CREAT SERPL-MCNC: 0.98 MG/DL (ref 0.6–1.3)
ERYTHROCYTE [DISTWIDTH] IN BLOOD BY AUTOMATED COUNT: 13.8 % (ref 11.6–15.1)
GFR SERPL CREATININE-BSD FRML MDRD: 76 ML/MIN/1.73SQ M
GLUCOSE SERPL-MCNC: 124 MG/DL (ref 65–140)
HCT VFR BLD AUTO: 37.7 % (ref 42–52)
HGB BLD-MCNC: 12.8 G/DL (ref 14–18)
MAGNESIUM SERPL-MCNC: 1.8 MG/DL (ref 1.6–2.6)
MCH RBC QN AUTO: 32.9 PG (ref 27–31)
MCHC RBC AUTO-ENTMCNC: 34 G/DL (ref 31.4–37.4)
MCV RBC AUTO: 97 FL (ref 82–98)
MRSA NOSE QL CULT: NORMAL
PHOSPHATE SERPL-MCNC: 3 MG/DL (ref 2.3–4.1)
PLATELET # BLD AUTO: 195 THOUSANDS/UL (ref 130–400)
PMV BLD AUTO: 6.6 FL (ref 8.9–12.7)
POTASSIUM SERPL-SCNC: 4.3 MMOL/L (ref 3.5–5.3)
RBC # BLD AUTO: 3.89 MILLION/UL (ref 4.7–6.1)
SODIUM SERPL-SCNC: 137 MMOL/L (ref 136–145)
WBC # BLD AUTO: 6.6 THOUSAND/UL (ref 4.8–10.8)

## 2017-12-31 PROCEDURE — 80048 BASIC METABOLIC PNL TOTAL CA: CPT | Performed by: STUDENT IN AN ORGANIZED HEALTH CARE EDUCATION/TRAINING PROGRAM

## 2017-12-31 PROCEDURE — 94640 AIRWAY INHALATION TREATMENT: CPT

## 2017-12-31 PROCEDURE — 83735 ASSAY OF MAGNESIUM: CPT | Performed by: STUDENT IN AN ORGANIZED HEALTH CARE EDUCATION/TRAINING PROGRAM

## 2017-12-31 PROCEDURE — 94760 N-INVAS EAR/PLS OXIMETRY 1: CPT

## 2017-12-31 PROCEDURE — 84100 ASSAY OF PHOSPHORUS: CPT | Performed by: STUDENT IN AN ORGANIZED HEALTH CARE EDUCATION/TRAINING PROGRAM

## 2017-12-31 PROCEDURE — 85027 COMPLETE CBC AUTOMATED: CPT | Performed by: STUDENT IN AN ORGANIZED HEALTH CARE EDUCATION/TRAINING PROGRAM

## 2017-12-31 RX ORDER — METHYLPREDNISOLONE SODIUM SUCCINATE 40 MG/ML
20 INJECTION, POWDER, LYOPHILIZED, FOR SOLUTION INTRAMUSCULAR; INTRAVENOUS EVERY 12 HOURS SCHEDULED
Status: DISCONTINUED | OUTPATIENT
Start: 2017-12-31 | End: 2018-01-03

## 2017-12-31 RX ORDER — MAGNESIUM SULFATE 1 G/100ML
1 INJECTION INTRAVENOUS ONCE
Status: COMPLETED | OUTPATIENT
Start: 2017-12-31 | End: 2017-12-31

## 2017-12-31 RX ORDER — AZITHROMYCIN 250 MG/1
500 TABLET, FILM COATED ORAL EVERY 24 HOURS
Status: DISCONTINUED | OUTPATIENT
Start: 2017-12-31 | End: 2017-12-31

## 2017-12-31 RX ORDER — HYDROCODONE POLISTIREX AND CHLORPHENIRAMINE POLISTIREX 10; 8 MG/5ML; MG/5ML
5 SUSPENSION, EXTENDED RELEASE ORAL EVERY 12 HOURS PRN
Status: DISCONTINUED | OUTPATIENT
Start: 2017-12-31 | End: 2018-01-03 | Stop reason: HOSPADM

## 2017-12-31 RX ADMIN — METHYLPREDNISOLONE SODIUM SUCCINATE 20 MG: 40 INJECTION, POWDER, FOR SOLUTION INTRAMUSCULAR; INTRAVENOUS at 06:18

## 2017-12-31 RX ADMIN — IPRATROPIUM BROMIDE AND ALBUTEROL SULFATE 3 ML: .5; 3 SOLUTION RESPIRATORY (INHALATION) at 12:00

## 2017-12-31 RX ADMIN — IPRATROPIUM BROMIDE AND ALBUTEROL SULFATE 3 ML: .5; 3 SOLUTION RESPIRATORY (INHALATION) at 15:12

## 2017-12-31 RX ADMIN — MELATONIN TAB 3 MG 3 MG: 3 TAB at 21:37

## 2017-12-31 RX ADMIN — AMLODIPINE BESYLATE 5 MG: 5 TABLET ORAL at 09:18

## 2017-12-31 RX ADMIN — SODIUM CHLORIDE 75 ML/HR: 0.9 INJECTION, SOLUTION INTRAVENOUS at 10:47

## 2017-12-31 RX ADMIN — MAGNESIUM SULFATE HEPTAHYDRATE 1 G: 1 INJECTION, SOLUTION INTRAVENOUS at 09:18

## 2017-12-31 RX ADMIN — PANTOPRAZOLE SODIUM 20 MG: 20 TABLET, DELAYED RELEASE ORAL at 06:18

## 2017-12-31 RX ADMIN — IPRATROPIUM BROMIDE AND ALBUTEROL SULFATE 3 ML: .5; 3 SOLUTION RESPIRATORY (INHALATION) at 22:41

## 2017-12-31 RX ADMIN — GUAIFENESIN 200 MG: 100 SOLUTION ORAL at 06:27

## 2017-12-31 RX ADMIN — HYDROCODONE POLISTIREX AND CHLORPHENIRAMINE POLISTIREX 5 ML: 10; 8 SUSPENSION, EXTENDED RELEASE ORAL at 18:10

## 2017-12-31 RX ADMIN — IPRATROPIUM BROMIDE AND ALBUTEROL SULFATE 3 ML: .5; 3 SOLUTION RESPIRATORY (INHALATION) at 08:06

## 2017-12-31 RX ADMIN — METHYLPREDNISOLONE SODIUM SUCCINATE 20 MG: 40 INJECTION, POWDER, FOR SOLUTION INTRAMUSCULAR; INTRAVENOUS at 18:03

## 2017-12-31 RX ADMIN — SODIUM CHLORIDE 75 ML/HR: 0.9 INJECTION, SOLUTION INTRAVENOUS at 23:07

## 2017-12-31 RX ADMIN — IPRATROPIUM BROMIDE AND ALBUTEROL SULFATE 3 ML: .5; 3 SOLUTION RESPIRATORY (INHALATION) at 04:12

## 2017-12-31 RX ADMIN — TAMSULOSIN HYDROCHLORIDE 0.4 MG: 0.4 CAPSULE ORAL at 21:37

## 2017-12-31 RX ADMIN — ENOXAPARIN SODIUM 40 MG: 40 INJECTION SUBCUTANEOUS at 09:18

## 2017-12-31 NOTE — PROGRESS NOTES
[facility] Practice Progress Note - José Luis Cardozo 67 y o  male MRN: [id number]    Unit/Bed#: [address] [place] [address] Encounter: [id number]      Assessment/Plan:    Shortness of Breath:  · Likely 2/2 COPD Exacerbation vs  Viral Tracheobronchitis vs  PNA  · Afebrile on admission / RR on admission: 24 / SpO2 on admission: 93 / WBC on admission: 6 8  · CXR on admission:  Chronic change, scarring and atelectasis at right lung base  · Maintain O2 Saturation greater than 92%  · Does not use Home Oxygen  · Urine Legionella: Negative / Urine Strep  Pneumoniae: Negative / Influenza A/B by PCR: Negative  · RSV PCR: Detected  · Blood Cx: NG @ 24h  · Obtain Ambulatory O2  · Continue Dounebs q4h/q2h  · Continue Solumedrol 20 q8h  · Continue Azithromycin 500mg IV q24h     Alpha-1 antitrypsin deficiency:  · Stable; will hold home medication: Zemaira  · Last Dose: 12/26/17; receives treatment once a week every Tuesday     COPD:  · Management as above  · Will hold home medication:  Advair, Spiriva, and Albuterol     Hypertension:  · Current BP: 145/68  · Stable; continue home medication: Amlodipine 5mg PO qd morning     GERD:  · Stable; continue home medication: Omeprazole 20mg PO qd     Benign Prostatic Hypertrophy:  · Stable; continue home medication: Flomax 0 4mg PO qd bedtime     Global:  · Regular Diet + IV NS 75cc/hr / Replete Electrolytes as Needed / Lovenox + SCDs      Subjective:   Patient seen and examined at bedside  Notes no new complaints overnight  States improvement in breathing and feels well overall  Objective:     Vitals: Blood pressure 145/68, pulse 73, temperature 98 4 °F (36 9 °C), temperature source Tympanic, resp  rate 20, height 6' 6" (1 981 m), weight 80 2 kg (176 lb 14 4 oz), SpO2 95 %  ,Body mass index is 20 44 kg/m²    Wt Readings from Last 3 Encounters:   12/29/17 80 2 kg (176 lb 14 4 oz)   03/08/17 79 4 kg (175 lb)     No intake or output data in the 24 hours ending 12/31/17 9448    Physical Exam: General appearance: alert, cooperative and no distress  Lungs: clear to auscultation bilaterally  Heart: regular rate and rhythm, S1, S2 normal, no murmur, click, rub or gallop  Abdomen: soft, non-tender; bowel sounds normal; no masses,  no organomegaly  Extremities: extremities normal, atraumatic, no cyanosis or edema  Pulses: 2+ and symmetric     No results found for this or any previous visit (from the past 24 hour(s))      Current Facility-Administered Medications   Medication Dose Route Frequency Provider Last Rate Last Dose    acetaminophen (TYLENOL) tablet 650 mg  650 mg Oral Q6H PRN Alivia Torres MD        amLODIPine (NORVASC) tablet 5 mg  5 mg Oral QAM Alivia Torres MD   5 mg at 12/30/17 0852    azithromycin (ZITHROMAX) 500 mg in sodium chloride 0 9% 250mL IVPB 500 mg  500 mg Intravenous Q24H Alivia Torres MD   500 mg at 12/30/17 2339    enoxaparin (LOVENOX) subcutaneous injection 40 mg  40 mg Subcutaneous Daily Alivia Torres MD   40 mg at 12/30/17 0852    guaiFENesin (ROBITUSSIN) oral solution 200 mg  200 mg Oral Q4H PRN Patricio Robles MD   200 mg at 12/31/17 0627    ipratropium-albuterol (DUO-NEB) 0 5-2 5 mg/mL inhalation solution 3 mL  3 mL Nebulization Q4H Alivia Torres MD   3 mL at 12/31/17 0412    ipratropium-albuterol (DUO-NEB) 0 5-2 5 mg/mL inhalation solution 3 mL  3 mL Nebulization Q2H PRN Alivia Torres MD        melatonin tablet 3 mg  3 mg Oral HS Nell Sahu DO   3 mg at 12/30/17 2218    methylPREDNISolone sodium succinate (Solu-MEDROL) injection 20 mg  20 mg Intravenous [facility] Alivia Torres MD   20 mg at 12/31/17 0618    pantoprazole (PROTONIX) EC tablet 20 mg  20 mg Oral Early Morning Alivia Torres MD   20 mg at 12/31/17 0618    sodium chloride 0 9 % infusion  75 mL/hr Intravenous Continuous Alivia Torres MD 75 mL/hr at 12/30/17 0552 75 mL/hr at 12/30/17 0552    tamsulosin (FLOMAX) capsule 0 4 mg  0 4 mg Oral HS Alivia Torres MD   0 4 mg at 12/30/17 0164 Invasive Devices     Peripheral Intravenous Line            Peripheral IV 12/29/17 Right Wrist 1 day                Lab, Imaging and other studies: I have personally reviewed pertinent reports  VTE Pharmacologic Prophylaxis: Enoxaparin (Lovenox)  VTE Mechanical Prophylaxis: sequential compression device          Maicol Rodriguez MD     SENIOR RESIDENT NOTE: I have personally seen and examined the patient, and agree with the residents assessment  Patient doing well today  Still desaturating with ambulation  SpO2: 92% on RA   (+)RSV  Continues on steroids and nebs  Ambulatory O2 study ordered  Anticipating discharge home tomorrow       Ricardo Lehman MD , MD

## 2017-12-31 NOTE — PHYSICIAN ADVISOR
Current patient class: Observation  The patient is currently on Hospital Day: 2      The patient was admitted to the hospital at N/A on N/A for the following diagnosis:  SOB (shortness of breath) [R06 02]  COPD exacerbation (HCC) [J44 1]       There is documentation in the medical record of an expected length of stay of at least 2 midnights  The patient is therefore expected to satisfy the 2 midnight benchmark and given the 2 midnight presumption is appropriate for INPATIENT ADMISSION  Given this expectation of a satisfying stay, CMS instructs us that the patient is most often appropriate for inpatient admission under part A provided medical necessity is documented in the chart  After review of the relevant documentation, labs, vital signs and test results, the patient is appropriate for INPATIENT ADMISSION  Admission to the hospital as an inpatient is a complex decision making process which requires the practitioner to consider the patients presenting complaint, history and physical examination and all relevant testing  With this in mind, in this case, the patient was deemed appropriate for INPATIENT ADMISSION  After review of the documentation and testing available at the time of the admission I concur with this clinical determination of medical necessity  Rationale is as follows: The patient is a 67 yrs old Male who presented to the ED at 12/29/2017  9:52 AM with a chief complaint of Shortness of Breath (short of breath , worse for the past 2 days  rib/chest pain )     The patient continues to remain hospitalized at the present time, and has noted to require further oxygen  Patient will satisfy the 2 midnight benchmark tonight, and is not able to be safely discharged home  Patient was noted to have likely COPD exacerbation, however urine Legionella, strep pneumonia, influenza were all negative, blood cultures are pending    Patient is currently receiving antibiotics, IV steroids, nebulizer treatments  Given this, the patient is appropriate for change in status to inpatient status  The patients vitals on arrival were ED Triage Vitals   Temperature Pulse Respirations Blood Pressure SpO2   12/29/17 0956 12/29/17 0959 12/29/17 0959 12/29/17 0959 12/29/17 0959   97 7 °F (36 5 °C) 98 (!) 24 (!) 172/70 95 %      Temp Source Heart Rate Source Patient Position - Orthostatic VS BP Location FiO2 (%)   12/29/17 0956 12/29/17 0959 12/29/17 0959 12/29/17 0959 --   Oral Monitor Sitting Right arm       Pain Score       12/29/17 0959       2           Past Medical History:   Diagnosis Date    Anesthesia complication     Difficult to wake up    Arthritis     BPH (benign prostatic hyperplasia)     urinary frequency    Cancer (HCC)     basal cell neck, face    COPD (chronic obstructive pulmonary disease) (HCC)     Full dentures     Hypertension     controlled    Kidney stone     at least 7 episodes    Liver disease     Alpha 1- enzyme deficiency    Pulmonary emphysema ([address] )     Wears glasses     for driving only     Past Surgical History:   Procedure Laterality Date    BACK SURGERY  2008    discectomy    COLONOSCOPY      COLONOSCOPY N/A 3/10/2017    Procedure: Marylen Cheek;  Surgeon: Sharifa Saenz MD;  Location: [facility] GI LAB; Service:    [place]      removal of kidney stones    ESOPHAGOGASTRODUODENOSCOPY N/A 3/10/2017    Procedure: ESOPHAGOGASTRODUODENOSCOPY (EGD); Surgeon: Sharifa Saenz MD;  Location: [facility] GI LAB;   Service:     LITHOTRIPSY      TONSILLECTOMY      VEIN LIGATION AND STRIPPING Bilateral     1980's           Consults have been placed to:   IP CONSULT TO CASE MANAGEMENT    Vitals:    12/30/17 1524 12/30/17 1658 12/30/17 2025 12/30/17 2038   BP:  160/81  145/68   Pulse:  61 88 87   Resp:  18 22 22   Temp:  98 8 °F (37 1 °C)  98 4 °F (36 9 °C)   TempSrc:  Tympanic  Tympanic   SpO2: 93% 95% 95% 94%   Weight:       Height:           Most recent labs:    Recent Labs 12/29/17   1014  12/30/17   0632   WBC  6 80  5 70   HGB  14 4  13 2*   HCT  41 8*  38 7*   PLT  210  204   K  4 3  4 1   NA  137  139   CALCIUM  8 9  8 4   BUN  14  13   CREATININE  1 39*  1 11   INR  1 07   --    TROPONINI  <0 02   --    AST  29   --    ALT  32   --    ALKPHOS  76   --    BILITOT  1 50*   --        Scheduled Meds:  amLODIPine 5 mg Oral QAM   azithromycin 500 mg Intravenous Q24H   enoxaparin 40 mg Subcutaneous Daily   ipratropium-albuterol 3 mL Nebulization Q4H   melatonin 3 mg Oral HS   methylPREDNISolone sodium succinate 20 mg Intravenous Q8H [address]   pantoprazole 20 mg Oral Early Morning   tamsulosin 0 4 mg Oral HS     Continuous Infusions:  sodium chloride 75 mL/hr Last Rate: 75 mL/hr (12/30/17 0552)     PRN Meds:   acetaminophen    guaiFENesin    ipratropium-albuterol    Surgical procedures (if appropriate):

## 2017-12-31 NOTE — PROGRESS NOTES
The azithromycin has / have been converted to Oral per [facility] IV-to-PO Auto-Conversion Protocol for Adults as approved by the Pharmacy and Therapeutics Committee  The patient met all eligible criteria:  3 Age = 25years old   2) Received at least one dose of the IV form   3) Receiving at least one other scheduled oral/enteral medication   4) Tolerating an oral/enteral diet   and did not have any exclusions:   1) Critical care patient   2) Active GI bleed (IF assessing H2RAs or PPIs)   3) Continuous tube feeding (IF assessing cipro, doxycycline, levofloxacin, minocycline, rifampin, or voriconazole)   4) Receiving PO vancomycin (IF assessing metronidazole)   5) Persistent nausea and/or vomiting   6) Ileus or gastrointestinal obstruction   7) Dannie/nasogastric tube set for continuous suction   8) Specific order not to automatically convert to PO (in the order's comments or if discussed in the most recent Infectious Disease or primary team's progress notes)

## 2018-01-01 LAB
ANION GAP SERPL CALCULATED.3IONS-SCNC: 5 MMOL/L (ref 4–13)
BUN SERPL-MCNC: 17 MG/DL (ref 5–25)
CALCIUM SERPL-MCNC: 8.2 MG/DL (ref 8.3–10.1)
CHLORIDE SERPL-SCNC: 104 MMOL/L (ref 100–108)
CO2 SERPL-SCNC: 27 MMOL/L (ref 21–32)
CREAT SERPL-MCNC: 0.95 MG/DL (ref 0.6–1.3)
ERYTHROCYTE [DISTWIDTH] IN BLOOD BY AUTOMATED COUNT: 13.9 % (ref 11.6–15.1)
GFR SERPL CREATININE-BSD FRML MDRD: 79 ML/MIN/1.73SQ M
GLUCOSE SERPL-MCNC: 111 MG/DL (ref 65–140)
HCT VFR BLD AUTO: 37.4 % (ref 42–52)
HGB BLD-MCNC: 12.9 G/DL (ref 14–18)
MAGNESIUM SERPL-MCNC: 1.9 MG/DL (ref 1.6–2.6)
MCH RBC QN AUTO: 33.3 PG (ref 27–31)
MCHC RBC AUTO-ENTMCNC: 34.5 G/DL (ref 31.4–37.4)
MCV RBC AUTO: 97 FL (ref 82–98)
PHOSPHATE SERPL-MCNC: 3.2 MG/DL (ref 2.3–4.1)
PLATELET # BLD AUTO: 200 THOUSANDS/UL (ref 130–400)
PMV BLD AUTO: 6.6 FL (ref 8.9–12.7)
POTASSIUM SERPL-SCNC: 4.2 MMOL/L (ref 3.5–5.3)
RBC # BLD AUTO: 3.87 MILLION/UL (ref 4.7–6.1)
SODIUM SERPL-SCNC: 136 MMOL/L (ref 136–145)
WBC # BLD AUTO: 6.6 THOUSAND/UL (ref 4.8–10.8)

## 2018-01-01 PROCEDURE — 94640 AIRWAY INHALATION TREATMENT: CPT

## 2018-01-01 PROCEDURE — 80048 BASIC METABOLIC PNL TOTAL CA: CPT | Performed by: STUDENT IN AN ORGANIZED HEALTH CARE EDUCATION/TRAINING PROGRAM

## 2018-01-01 PROCEDURE — 94760 N-INVAS EAR/PLS OXIMETRY 1: CPT

## 2018-01-01 PROCEDURE — 94668 MNPJ CHEST WALL SBSQ: CPT

## 2018-01-01 PROCEDURE — 85027 COMPLETE CBC AUTOMATED: CPT | Performed by: STUDENT IN AN ORGANIZED HEALTH CARE EDUCATION/TRAINING PROGRAM

## 2018-01-01 PROCEDURE — 84100 ASSAY OF PHOSPHORUS: CPT | Performed by: STUDENT IN AN ORGANIZED HEALTH CARE EDUCATION/TRAINING PROGRAM

## 2018-01-01 PROCEDURE — 83735 ASSAY OF MAGNESIUM: CPT | Performed by: STUDENT IN AN ORGANIZED HEALTH CARE EDUCATION/TRAINING PROGRAM

## 2018-01-01 RX ORDER — GUAIFENESIN 100 MG/5ML
200 SOLUTION ORAL EVERY 4 HOURS PRN
Status: DISCONTINUED | OUTPATIENT
Start: 2018-01-01 | End: 2018-01-01

## 2018-01-01 RX ORDER — GUAIFENESIN 600 MG
600 TABLET, EXTENDED RELEASE 12 HR ORAL EVERY 12 HOURS SCHEDULED
Status: DISCONTINUED | OUTPATIENT
Start: 2018-01-01 | End: 2018-01-03

## 2018-01-01 RX ORDER — BENZONATATE 100 MG/1
100 CAPSULE ORAL 3 TIMES DAILY PRN
Status: DISCONTINUED | OUTPATIENT
Start: 2018-01-01 | End: 2018-01-03 | Stop reason: HOSPADM

## 2018-01-01 RX ADMIN — METHYLPREDNISOLONE SODIUM SUCCINATE 20 MG: 40 INJECTION, POWDER, FOR SOLUTION INTRAMUSCULAR; INTRAVENOUS at 17:48

## 2018-01-01 RX ADMIN — MELATONIN TAB 3 MG 3 MG: 3 TAB at 21:57

## 2018-01-01 RX ADMIN — SODIUM CHLORIDE 75 ML/HR: 0.9 INJECTION, SOLUTION INTRAVENOUS at 23:27

## 2018-01-01 RX ADMIN — BENZONATATE 100 MG: 100 CAPSULE ORAL at 21:57

## 2018-01-01 RX ADMIN — BENZONATATE 100 MG: 100 CAPSULE ORAL at 11:44

## 2018-01-01 RX ADMIN — FLUTICASONE PROPIONATE AND SALMETEROL 1 PUFF: 50; 250 POWDER RESPIRATORY (INHALATION) at 16:17

## 2018-01-01 RX ADMIN — HYDROCODONE POLISTIREX AND CHLORPHENIRAMINE POLISTIREX 5 ML: 10; 8 SUSPENSION, EXTENDED RELEASE ORAL at 06:10

## 2018-01-01 RX ADMIN — IPRATROPIUM BROMIDE AND ALBUTEROL SULFATE 3 ML: .5; 3 SOLUTION RESPIRATORY (INHALATION) at 04:03

## 2018-01-01 RX ADMIN — TAMSULOSIN HYDROCHLORIDE 0.4 MG: 0.4 CAPSULE ORAL at 21:57

## 2018-01-01 RX ADMIN — PANTOPRAZOLE SODIUM 20 MG: 20 TABLET, DELAYED RELEASE ORAL at 06:03

## 2018-01-01 RX ADMIN — GUAIFENESIN 600 MG: 600 TABLET, EXTENDED RELEASE ORAL at 21:57

## 2018-01-01 RX ADMIN — IPRATROPIUM BROMIDE AND ALBUTEROL SULFATE 3 ML: .5; 3 SOLUTION RESPIRATORY (INHALATION) at 21:04

## 2018-01-01 RX ADMIN — ACETAMINOPHEN 650 MG: 325 TABLET, FILM COATED ORAL at 16:18

## 2018-01-01 RX ADMIN — IPRATROPIUM BROMIDE AND ALBUTEROL SULFATE 3 ML: .5; 3 SOLUTION RESPIRATORY (INHALATION) at 08:04

## 2018-01-01 RX ADMIN — IPRATROPIUM BROMIDE AND ALBUTEROL SULFATE 3 ML: .5; 3 SOLUTION RESPIRATORY (INHALATION) at 15:13

## 2018-01-01 RX ADMIN — GUAIFENESIN 600 MG: 600 TABLET, EXTENDED RELEASE ORAL at 14:20

## 2018-01-01 RX ADMIN — METHYLPREDNISOLONE SODIUM SUCCINATE 20 MG: 40 INJECTION, POWDER, FOR SOLUTION INTRAMUSCULAR; INTRAVENOUS at 06:03

## 2018-01-01 RX ADMIN — FLUTICASONE PROPIONATE AND SALMETEROL 1 PUFF: 50; 250 POWDER RESPIRATORY (INHALATION) at 21:57

## 2018-01-01 RX ADMIN — SODIUM CHLORIDE 75 ML/HR: 0.9 INJECTION, SOLUTION INTRAVENOUS at 11:44

## 2018-01-01 RX ADMIN — ENOXAPARIN SODIUM 40 MG: 40 INJECTION SUBCUTANEOUS at 09:26

## 2018-01-01 RX ADMIN — AMLODIPINE BESYLATE 5 MG: 5 TABLET ORAL at 09:26

## 2018-01-01 RX ADMIN — IPRATROPIUM BROMIDE AND ALBUTEROL SULFATE 3 ML: .5; 3 SOLUTION RESPIRATORY (INHALATION) at 11:26

## 2018-01-01 NOTE — PLAN OF CARE
DISCHARGE PLANNING     Discharge to home or other facility with appropriate resources Progressing        DISCHARGE PLANNING - CARE MANAGEMENT     Discharge to post-acute care or home with appropriate resources Progressing        INFECTION - ADULT     Absence or prevention of progression during hospitalization Progressing        Knowledge Deficit     Patient/family/caregiver demonstrates understanding of disease process, treatment plan, medications, and discharge instructions Progressing        Potential for Falls     Patient will remain free of falls Progressing        RESPIRATORY - ADULT     Achieves optimal ventilation and oxygenation Progressing

## 2018-01-01 NOTE — PROGRESS NOTES
[address] [facility] [address] [facility] Progress Note - John Bertrand 67 y o  male MRN: [id number]    Unit/Bed#: [address] [address]- Encounter: [id number]      Assessment/Plan:  Shortness of breath likely secondary to CPOD exacerbation vs viral RSV infection  -CXR: chronic changes, scarring, atelectasis ar right lung base  -titrate O2 as needed, maintain sats above 92%  -Blood cultures pending  -Urine legionelle, strep penumo and flu negative  -RSV PCR detected  -duonebs q4h/q2h PRN  -Solumedrol 20 mg q12h  Alpha-1 antitrypsin deficiency  -stable, continue with Zemaira weekly on tuesdays  -last dose 12/26/2017  COPD  -held home medications - advair, Spiriva, and albuterol  HTN  -stable, amlodipine 5mg qd  GERD  -stable, omeprazole 20 mg qd  BPH  -stable, flomax 0 4 mg qd  Global: regular diet, IV NS 75 c/hr, lovenox and SCDs      Subjective:   Patient seen and examined at the bedside  Reports improvement with shortness of breath, however still having difficulty breathing while ambulating  Objective:     Vitals: Blood pressure 170/74, pulse 68, temperature (!) 97 3 °F (36 3 °C), temperature source Tympanic, resp  rate 18, height 6' 6" (1 981 m), weight 80 2 kg (176 lb 14 4 oz), SpO2 95 %  ,Body mass index is 20 44 kg/m²  Wt Readings from Last 3 Encounters:   12/29/17 80 2 kg (176 lb 14 4 oz)   03/08/17 79 4 kg (175 lb)       Intake/Output Summary (Last 24 hours) at 01/01/18 1049  Last data filed at 12/31/17 2307   Gross per 24 hour   Intake             1000 ml   Output                0 ml   Net             1000 ml       Physical Exam:   General: Pt is AAO x 3, not in any acute distress  Cardio: RRR, S1 and S2 +, no murmurs/rubs/gallops/clicks  Resp: CTA b/l, no wheezes/rales/rhonchi  Abdomen: soft, NT/ND, BS+  Extremities: no cyanosis or edema  PP 2+ b/l       Recent Results (from the past 24 hour(s))   Basic metabolic panel    Collection Time: 01/01/18  4:52 AM   Result Value Ref Range    Sodium 136 136 - 145 mmol/L    Potassium 4 2 3 5 - 5 3 mmol/L    Chloride 104 100 - 108 mmol/L    CO2 27 21 - 32 mmol/L    Anion Gap 5 4 - 13 mmol/L    BUN 17 5 - 25 mg/dL    Creatinine 0 95 0 60 - 1 30 mg/dL    Glucose 111 65 - 140 mg/dL    Calcium 8 2 (L) 8 3 - 10 1 mg/dL    eGFR 79 ml/min/1 73sq m   Magnesium    Collection Time: 01/01/18  4:52 AM   Result Value Ref Range    Magnesium 1 9 1 6 - 2 6 mg/dL   CBC (With Platelets)    Collection Time: 01/01/18  4:52 AM   Result Value Ref Range    WBC 6 60 4 80 - 10 80 Thousand/uL    RBC 3 87 (L) 4 70 - 6 10 Million/uL    Hemoglobin 12 9 (L) 14 0 - 18 0 g/dL    Hematocrit 37 4 (L) 42 0 - 52 0 %    MCV 97 82 - 98 fL    MCH 33 3 (H) 27 0 - 31 0 pg    MCHC 34 5 31 4 - 37 4 g/dL    RDW 13 9 11 6 - 15 1 %    Platelets 376 386 - 371 Thousands/uL    MPV 6 6 (L) 8 9 - 12 7 fL   Phosphorus    Collection Time: 01/01/18  4:52 AM   Result Value Ref Range    Phosphorus 3 2 2 3 - 4 1 mg/dL       Current Facility-Administered Medications   Medication Dose Route Frequency Provider Last Rate Last Dose    acetaminophen (TYLENOL) tablet 650 mg  650 mg Oral Q6H PRN Jayy Palacio MD        amLODIPine (NORVASC) tablet 5 mg  5 mg Oral QAM Jayy Palacio MD   5 mg at 01/01/18 0926    enoxaparin (LOVENOX) subcutaneous injection 40 mg  40 mg Subcutaneous Daily Jayy Palacio MD   40 mg at 01/01/18 0926    hydrocodone-chlorpheniramine polistirex (TUSSIONEX) 10-8 mg/5 mL ER suspension 5 mL  5 mL Oral Q12H PRN Suzy Tim MD   5 mL at 01/01/18 0610    ipratropium-albuterol (DUO-NEB) 0 5-2 5 mg/mL inhalation solution 3 mL  3 mL Nebulization Q4H Jayy Palacio MD   3 mL at 01/01/18 0804    ipratropium-albuterol (DUO-NEB) 0 5-2 5 mg/mL inhalation solution 3 mL  3 mL Nebulization Q2H PRN Jayy Palacio MD        melatonin tablet 3 mg  3 mg Oral HS Nell Sahu DO   3 mg at 12/31/17 2138    methylPREDNISolone sodium succinate (Solu-MEDROL) injection 20 mg  20 mg Intravenous Q12H [address] Abel Cruz MD   20 mg at 01/01/18 8658  pantoprazole (PROTONIX) EC tablet 20 mg  20 mg Oral Early Morning Jaquelin Fine MD   20 mg at 01/01/18 0603    sodium chloride 0 9 % infusion  75 mL/hr Intravenous Continuous Jaquelin Fine MD 75 mL/hr at 12/31/17 2307 75 mL/hr at 12/31/17 2307    tamsulosin (FLOMAX) capsule 0 4 mg  0 4 mg Oral HS Jaquelin Fine MD   0 4 mg at 12/31/17 2137       Invasive Devices     Peripheral Intravenous Line            Peripheral IV 12/29/17 Right Wrist 3 days                    Kaylen Carson MD

## 2018-01-01 NOTE — CONSULTS
Consultation - Pulmonary Medicine   Toño Strickland 67 y o  male MRN: [id number]  Unit/Bed#: [address] [place] [address] Encounter: [id number]      Assessment:  Acute exacerbation of moderate to severe COPD secondary to RSV infection  Spirometry done 1/25/17 reveals a baseline FEV1 of 2 45 L or 59% predicted which is moderate to severely decreased  Alpha 1 antitrypsin deficiency  History of phenotype PiSZ the patient was diagnosed 2002 and has been on IV AAT weekly since then  History of hypertension  History of GERD    Plan:   Concur with IV methylprednisolone 20 mg every 12 hours  Will order sputum culture in case patient has any secondary bacterial bronchitis  Continue nebulizer treatments with albuterol-ipratropium every 4 hours  One patient goes home he will resume his Spiriva 1 puff daily  I will resume patient's Advair 250 mcg Diskus 1 puff b i d  Patient normally receives his IV alpha-1 proteinase inhibitor (Zemaira) every Tuesday on a weekly basis and is administered by a visiting nurse  This was will be postponed for now  When patient is discharged home he will need a prescription for albuterol 2 5 mg for his nebulizer and he will also resume using his Spiriva 1 puff daily  I will order Acapella valve to help with mucus clearance and I did request a sputum culture  I will order guaifenesin 600 mg p o  b i d  History of Present Illness   Physician Requesting Consult: Lubna Reyes MD  Reason for Consult / Principal Problem:  Dyspnea, COPD exacerbation  Hx and PE limited by: none    HPI: Toño Strickland is a 67y o  year old male who presents with complaints of worsening shortness of breath over the past several days prior to admission  He did notice some increased cough which is mostly nonproductive  No fever, chills, or chest pain  He has a cough which is mostly nonproductive  Chest x-ray done on admission shows no infiltrates  Nasal swab for RSV was positive    Initial CBC showed white blood cell count of 5 7 hemoglobin 13 2  Urine for strep pneumonia antigen and Legionella done is negative  Patient has had some slight improvement in his cough and exertional dyspnea since admission  He presently is on nebulizer treatments with albuterol-ipratropium every 4 hours and methylprednisone 20 mg IV every 12 hours  Patient was diagnosed with alpha-1 antitrypsin deficiency in 2002 by his pulmonologist Dr Israel Jacobo  He has been on weekly alpha-1 antitrypsin protein (AAT) IV  replacement therapy with since the  He thinks that his phenotype is PiSZ  He quit smoking in August 2013 but prior to that smoked approximately 1 pack per day for 60 years  I did review spirometry done previously  Last spirometry done 1/25/17 real to revealed the forced vital capacity 5 24 L or 94% predicted, FEV1 was moderately decreased at 2 45 L or 59% of predicted and obstructive index decreased at 47%  This is compatible with moderate airflow obstruction  His spirometry values have remained stable over the past 1 year/      Review of Systems   Constitutional:        Patient with worsening shortness of breath over the past  10 days  Complains of some nonproductive cough  No fever or chills   HENT: Positive for congestion and postnasal drip  Negative for trouble swallowing  Eyes: Negative for pain and itching  Respiratory: Positive for cough and shortness of breath  Has some intermittent wheezing and shortness of breath with minimal activity   Cardiovascular: Negative for chest pain and leg swelling  Gastrointestinal: Negative for abdominal pain, diarrhea, nausea and vomiting  Endocrine: Negative for polydipsia and polyphagia  Genitourinary: Negative for dysuria  Musculoskeletal: Negative for arthralgias and myalgias  Skin: Negative for rash  Neurological: Negative for dizziness and light-headedness  Psychiatric/Behavioral: Negative for agitation and hallucinations         Historical Information   Past Medical History:   Diagnosis Date    Anesthesia complication     Difficult to wake up    Arthritis     BPH (benign prostatic hyperplasia)     urinary frequency    Cancer (HCC)     basal cell neck, face    COPD (chronic obstructive pulmonary disease) (HCC)     Full dentures     Hypertension     controlled    Kidney stone     at least 7 episodes    Liver disease     Alpha 1- enzyme deficiency - diagnosed 2002  has been on weekly replacement therapy since then    Pulmonary emphysema ([facility] [address] )     1/25/15  FEV1 - 2 45 liters or 59% of predicted    Wears glasses     for driving only     Past Surgical History:   Procedure Laterality Date    BACK SURGERY  2008    discectomy    COLONOSCOPY      COLONOSCOPY N/A 3/10/2017    Procedure: Que White;  Surgeon: Yoel Laguna MD;  Location[facility]; Service:    Nory Presume      removal of kidney stones    ESOPHAGOGASTRODUODENOSCOPY N/A 3/10/2017    Procedure: ESOPHAGOGASTRODUODENOSCOPY (EGD); Surgeon: Yoel Laguna MD;  Location: [facility] GI LAB;   Service:     LITHOTRIPSY      TONSILLECTOMY      VEIN LIGATION AND STRIPPING Bilateral     1980's     Social History   History   Alcohol Use No     History   Drug Use No     History   Smoking Status    Former Smoker    Packs/day: 0 00    Years: 60 00   Smokeless Tobacco    Never Used     Comment: quit in august 2017         Family History:   Family History   Problem Relation Age of Onset    Emphysema Mother      never smoked    Emphysema Father     Cancer Brother      colon       Meds/Allergies     Current Facility-Administered Medications:     acetaminophen (TYLENOL) tablet 650 mg, 650 mg, Oral, Q6H PRN, Jayy Palacio MD    amLODIPine (NORVASC) tablet 5 mg, 5 mg, Oral, QAM, Jayy Palacio MD, 5 mg at 01/01/18 0926    benzonatate (TESSALON PERLES) capsule 100 mg, 100 mg, Oral, TID PRN, Verito Whitt MD, 100 mg at 01/01/18 1144    enoxaparin (LOVENOX) subcutaneous injection 40 mg, 40 mg, Subcutaneous, Daily, Abril Majano MD, 40 mg at 01/01/18 0926    hydrocodone-chlorpheniramine polistirex (TUSSIONEX) 10-8 mg/5 mL ER suspension 5 mL, 5 mL, Oral, Q12H PRN, Elliot Martin MD, 5 mL at 01/01/18 0610    ipratropium-albuterol (DUO-NEB) 0 5-2 5 mg/mL inhalation solution 3 mL, 3 mL, Nebulization, Q4H, Abril Majano MD, 3 mL at 01/01/18 1126    ipratropium-albuterol (DUO-NEB) 0 5-2 5 mg/mL inhalation solution 3 mL, 3 mL, Nebulization, Q2H PRN, Abril Majano MD    melatonin tablet 3 mg, 3 mg, Oral, HS, Nell Sahu DO, 3 mg at 12/31/17 2137    methylPREDNISolone sodium succinate (Solu-MEDROL) injection 20 mg, 20 mg, Intravenous, Q12H [facility], [facility] Savita Cortez MD, 20 mg at 01/01/18 0603    pantoprazole (PROTONIX) EC tablet 20 mg, 20 mg, Oral, Early Morning, Abril Majano MD, 20 mg at 01/01/18 0603    sodium chloride 0 9 % infusion, 75 mL/hr, Intravenous, Continuous, Abril Majano MD, Last Rate: 75 mL/hr at 01/01/18 1144, 75 mL/hr at 01/01/18 1144    tamsulosin (FLOMAX) capsule 0 4 mg, 0 4 mg, Oral, HS, Abril Majano MD, 0 4 mg at 12/31/17 2137    Prior to Admission medications    Medication Sig Start Date End Date Taking?  Authorizing Provider   albuterol (PROVENTIL HFA,VENTOLIN HFA) 90 mcg/act inhaler Inhale 2 puffs every 6 (six) hours as needed for wheezing    Historical Provider, MD   Alpha1-Proteinase Inhibitor (ZEMAIRA IV) Infuse into a venous catheter once a week On tuesday -1000mg /50 ml     Historical Provider, MD   amLODIPine (NORVASC) 5 mg tablet Take 5 mg by mouth every morning    Historical Provider, MD   azelastine (ASTELIN) 0 1 % nasal spray 1 spray into each nostril 2 (two) times a day Use in each nostril as directed    Historical Provider, MD   diphenhydrAMINE (SOMINEX) 25 MG tablet Take 25 mg by mouth daily at bedtime as needed for sleep    Historical Provider, MD   fluticasone-salmeterol (ADVAIR) 250-50 mcg/dose inhaler Inhale 1 puff every 12 (twelve) hours    Historical Provider, MD   Omeprazole Magnesium (PRILOSEC OTC PO) Take 20 mg by mouth every morning    Historical Provider, MD   tamsulosin (FLOMAX) 0 4 mg Take 0 4 mg by mouth daily at bedtime    Historical Provider, MD   tiotropium (SPIRIVA HANDIHALER) 18 mcg inhalation capsule Place 18 mcg into inhaler and inhale every morning    Historical Provider, MD       Allergies   Allergen Reactions    Chantix [Varenicline]      Caused prostate infection       Objective   Vitals: Blood pressure 170/74, pulse 68, temperature (!) 97 3 °F (36 3 °C), temperature source Tympanic, resp  rate 18, height 6' 6" (1 981 m), weight 80 2 kg (176 lb 14 4 oz), SpO2 94 %  ,Body mass index is 20 44 kg/m²  Intake/Output Summary (Last 24 hours) at 01/01/18 1241  Last data filed at 01/01/18 1144   Gross per 24 hour   Intake             2000 ml   Output                0 ml   Net             2000 ml     Invasive Devices     Peripheral Intravenous Line            Peripheral IV 12/29/17 Right Wrist 3 days                Physical Exam   Constitutional: He is oriented to person, place, and time  Patient is awake, alert, no distress  On 2 L O2 saturation is 96%   HENT:   Head: Normocephalic  Nose: Nose normal    Mouth/Throat: Oropharynx is clear and moist    Eyes: Conjunctivae are normal  No scleral icterus  Neck: Neck supple  No JVD present  No tracheal deviation present  Cardiovascular: Normal rate, regular rhythm and normal heart sounds  Pulmonary/Chest: Effort normal    Lung sounds are decreased with some faint expiratory wheeze in lower lobes and a few rhonchi   Abdominal: Soft  He exhibits no distension  There is no tenderness  There is no guarding  Musculoskeletal: He exhibits no edema  Neurological: He is oriented to person, place, and time  Skin: Skin is warm and dry  No rash noted  He is not diaphoretic  No erythema  Psychiatric: He has a normal mood and affect   His behavior is normal  Thought content normal        Lab Results: ABG: No results found for: PHART, FOU7GYP, PO2ART, TVT4ZBC, H2HVPONK, BEART, SOURCE, BNP: No results found for: BNP, CBC:   Lab Results   Component Value Date    WBC 6 60 01/01/2018    HGB 12 9 (L) 01/01/2018    HCT 37 4 (L) 01/01/2018    MCV 97 01/01/2018     01/01/2018    MCH 33 3 (H) 01/01/2018    MCHC 34 5 01/01/2018    RDW 13 9 01/01/2018    MPV 6 6 (L) 01/01/2018    NRBC 0 12/29/2017   , CMP:   Lab Results   Component Value Date     01/01/2018    K 4 2 01/01/2018     01/01/2018    CO2 27 01/01/2018    ANIONGAP 5 01/01/2018    BUN 17 01/01/2018    CREATININE 0 95 01/01/2018    GLUCOSE 111 01/01/2018    CALCIUM 8 2 (L) 01/01/2018    AST 29 12/29/2017    ALT 32 12/29/2017    ALKPHOS 76 12/29/2017    PROT 7 3 12/29/2017    ALBUMIN 3 5 12/29/2017    BILITOT 1 50 (H) 12/29/2017    EGFR 79 01/01/2018   , PT/INR:   Lab Results   Component Value Date    INR 1 07 12/29/2017   , Troponin: No components found for: TROPONIN    Imaging Studies: I have personally reviewed pertinent reports  and I have personally reviewed pertinent films in PACS    EKG, Pathology, and Other Studies: I have personally reviewed pertinent reports  VTE Prophylaxis: Enoxaparin (Lovenox)    Code Status: Level 1 - Full Code    Counseling/Coordination of Care: Total floor / unit time spent today 30 minutes  Greater than 50% of total time was spent with the patient and / or family counseling and / or coordination of care  A description of the counseling / coordination of care: I reviewed patient's chart, chest radiographs and prior records    I discussed diagnosis and treatment with him

## 2018-01-02 ENCOUNTER — APPOINTMENT (INPATIENT)
Dept: RADIOLOGY | Facility: HOSPITAL | Age: 73
DRG: 190 | End: 2018-01-02
Payer: MEDICARE

## 2018-01-02 LAB
ANION GAP SERPL CALCULATED.3IONS-SCNC: 5 MMOL/L (ref 4–13)
BASOPHILS # BLD AUTO: 0 THOUSANDS/ΜL (ref 0–0.1)
BASOPHILS NFR BLD AUTO: 0 % (ref 0–1)
BUN SERPL-MCNC: 18 MG/DL (ref 5–25)
CALCIUM SERPL-MCNC: 8.5 MG/DL (ref 8.3–10.1)
CHLORIDE SERPL-SCNC: 103 MMOL/L (ref 100–108)
CO2 SERPL-SCNC: 30 MMOL/L (ref 21–32)
CREAT SERPL-MCNC: 1 MG/DL (ref 0.6–1.3)
EOSINOPHIL # BLD AUTO: 0 THOUSAND/ΜL (ref 0–0.61)
EOSINOPHIL NFR BLD AUTO: 0 % (ref 0–6)
ERYTHROCYTE [DISTWIDTH] IN BLOOD BY AUTOMATED COUNT: 13.3 % (ref 11.6–15.1)
GFR SERPL CREATININE-BSD FRML MDRD: 74 ML/MIN/1.73SQ M
GLUCOSE SERPL-MCNC: 116 MG/DL (ref 65–140)
HCT VFR BLD AUTO: 38.6 % (ref 42–52)
HGB BLD-MCNC: 13.4 G/DL (ref 14–18)
LYMPHOCYTES # BLD AUTO: 0.9 THOUSANDS/ΜL (ref 0.6–4.47)
LYMPHOCYTES NFR BLD AUTO: 13 % (ref 14–44)
MAGNESIUM SERPL-MCNC: 1.9 MG/DL (ref 1.6–2.6)
MCH RBC QN AUTO: 33.1 PG (ref 27–31)
MCHC RBC AUTO-ENTMCNC: 34.6 G/DL (ref 31.4–37.4)
MCV RBC AUTO: 96 FL (ref 82–98)
MONOCYTES # BLD AUTO: 0.6 THOUSAND/ΜL (ref 0.17–1.22)
MONOCYTES NFR BLD AUTO: 8 % (ref 4–12)
NEUTROPHILS # BLD AUTO: 5.7 THOUSANDS/ΜL (ref 1.85–7.62)
NEUTS SEG NFR BLD AUTO: 79 % (ref 43–75)
NRBC BLD AUTO-RTO: 0 /100 WBCS
PHOSPHATE SERPL-MCNC: 3.3 MG/DL (ref 2.3–4.1)
PLATELET # BLD AUTO: 207 THOUSANDS/UL (ref 130–400)
PMV BLD AUTO: 6.5 FL (ref 8.9–12.7)
POTASSIUM SERPL-SCNC: 4.1 MMOL/L (ref 3.5–5.3)
RBC # BLD AUTO: 4.04 MILLION/UL (ref 4.7–6.1)
SODIUM SERPL-SCNC: 138 MMOL/L (ref 136–145)
WBC # BLD AUTO: 7.2 THOUSAND/UL (ref 4.8–10.8)

## 2018-01-02 PROCEDURE — 74018 RADEX ABDOMEN 1 VIEW: CPT

## 2018-01-02 PROCEDURE — 94640 AIRWAY INHALATION TREATMENT: CPT

## 2018-01-02 PROCEDURE — 84100 ASSAY OF PHOSPHORUS: CPT | Performed by: STUDENT IN AN ORGANIZED HEALTH CARE EDUCATION/TRAINING PROGRAM

## 2018-01-02 PROCEDURE — 94760 N-INVAS EAR/PLS OXIMETRY 1: CPT

## 2018-01-02 PROCEDURE — 83735 ASSAY OF MAGNESIUM: CPT | Performed by: STUDENT IN AN ORGANIZED HEALTH CARE EDUCATION/TRAINING PROGRAM

## 2018-01-02 PROCEDURE — 80048 BASIC METABOLIC PNL TOTAL CA: CPT | Performed by: STUDENT IN AN ORGANIZED HEALTH CARE EDUCATION/TRAINING PROGRAM

## 2018-01-02 PROCEDURE — 85025 COMPLETE CBC W/AUTO DIFF WBC: CPT | Performed by: STUDENT IN AN ORGANIZED HEALTH CARE EDUCATION/TRAINING PROGRAM

## 2018-01-02 PROCEDURE — 94668 MNPJ CHEST WALL SBSQ: CPT

## 2018-01-02 PROCEDURE — 82103 ALPHA-1-ANTITRYPSIN TOTAL: CPT | Performed by: INTERNAL MEDICINE

## 2018-01-02 RX ORDER — POLYETHYLENE GLYCOL 3350 17 G/17G
17 POWDER, FOR SOLUTION ORAL ONCE
Status: COMPLETED | OUTPATIENT
Start: 2018-01-02 | End: 2018-01-02

## 2018-01-02 RX ADMIN — BENZONATATE 100 MG: 100 CAPSULE ORAL at 05:47

## 2018-01-02 RX ADMIN — GUAIFENESIN 600 MG: 600 TABLET, EXTENDED RELEASE ORAL at 09:11

## 2018-01-02 RX ADMIN — ENOXAPARIN SODIUM 40 MG: 40 INJECTION SUBCUTANEOUS at 09:12

## 2018-01-02 RX ADMIN — AMLODIPINE BESYLATE 5 MG: 5 TABLET ORAL at 09:11

## 2018-01-02 RX ADMIN — POLYETHYLENE GLYCOL 3350 17 G: 17 POWDER, FOR SOLUTION ORAL at 12:19

## 2018-01-02 RX ADMIN — IPRATROPIUM BROMIDE AND ALBUTEROL SULFATE 3 ML: .5; 3 SOLUTION RESPIRATORY (INHALATION) at 02:07

## 2018-01-02 RX ADMIN — SODIUM CHLORIDE 75 ML/HR: 0.9 INJECTION, SOLUTION INTRAVENOUS at 12:19

## 2018-01-02 RX ADMIN — GUAIFENESIN 600 MG: 600 TABLET, EXTENDED RELEASE ORAL at 22:48

## 2018-01-02 RX ADMIN — MAGNESIUM HYDROXIDE 30 ML: 400 SUSPENSION ORAL at 14:31

## 2018-01-02 RX ADMIN — HYDROCODONE POLISTIREX AND CHLORPHENIRAMINE POLISTIREX 5 ML: 10; 8 SUSPENSION, EXTENDED RELEASE ORAL at 22:48

## 2018-01-02 RX ADMIN — IPRATROPIUM BROMIDE AND ALBUTEROL SULFATE 3 ML: .5; 3 SOLUTION RESPIRATORY (INHALATION) at 07:24

## 2018-01-02 RX ADMIN — TAMSULOSIN HYDROCHLORIDE 0.4 MG: 0.4 CAPSULE ORAL at 22:48

## 2018-01-02 RX ADMIN — MELATONIN TAB 3 MG 3 MG: 3 TAB at 22:48

## 2018-01-02 RX ADMIN — IPRATROPIUM BROMIDE AND ALBUTEROL SULFATE 3 ML: .5; 3 SOLUTION RESPIRATORY (INHALATION) at 20:11

## 2018-01-02 RX ADMIN — IPRATROPIUM BROMIDE AND ALBUTEROL SULFATE 3 ML: .5; 3 SOLUTION RESPIRATORY (INHALATION) at 15:47

## 2018-01-02 RX ADMIN — PANTOPRAZOLE SODIUM 20 MG: 20 TABLET, DELAYED RELEASE ORAL at 05:47

## 2018-01-02 RX ADMIN — IPRATROPIUM BROMIDE AND ALBUTEROL SULFATE 3 ML: .5; 3 SOLUTION RESPIRATORY (INHALATION) at 11:17

## 2018-01-02 RX ADMIN — METHYLPREDNISOLONE SODIUM SUCCINATE 20 MG: 40 INJECTION, POWDER, FOR SOLUTION INTRAMUSCULAR; INTRAVENOUS at 17:50

## 2018-01-02 RX ADMIN — HYDROCODONE POLISTIREX AND CHLORPHENIRAMINE POLISTIREX 5 ML: 10; 8 SUSPENSION, EXTENDED RELEASE ORAL at 01:23

## 2018-01-02 RX ADMIN — ACETAMINOPHEN 650 MG: 325 TABLET, FILM COATED ORAL at 22:47

## 2018-01-02 RX ADMIN — ACETAMINOPHEN 650 MG: 325 TABLET, FILM COATED ORAL at 01:23

## 2018-01-02 RX ADMIN — FLUTICASONE PROPIONATE AND SALMETEROL 1 PUFF: 50; 250 POWDER RESPIRATORY (INHALATION) at 09:12

## 2018-01-02 RX ADMIN — METHYLPREDNISOLONE SODIUM SUCCINATE 20 MG: 40 INJECTION, POWDER, FOR SOLUTION INTRAMUSCULAR; INTRAVENOUS at 05:47

## 2018-01-02 RX ADMIN — FLUTICASONE PROPIONATE AND SALMETEROL 1 PUFF: 50; 250 POWDER RESPIRATORY (INHALATION) at 22:50

## 2018-01-02 NOTE — PROGRESS NOTES
Progress Note - Pulmonary   Ayaka Deepti 67 y o  male MRN: [id number]  Unit/Bed#: [facility] 329-01 Encounter: [id number]      Assessment/Plan:     1  COPD exacerbation secondary to RSV infection and acute hypoxemic respiratory failure secondary to this  Wean O2 as tolerated  Maintain O2 sat greater than 88 to 90%  FEV1 of 59% at baseline  Patient is on Solu-Medrol 20 mg IV q 12  Sputum culture pending  Continue with nebulizer therapy    2  Alpha 1 antitrypsin deficiency on weekly supplementation    3  Abdominal pain likely secondary to coughing however will be undergoing CT scan and further evaluation    4  Hypertension stable    5  GERD continue with omeprazole        ______________________________________________________________________    Subjective: Pt seen and examined at bedside  Has abdominal pain otherwise breathing is improved  Cough is improved  No nausea vomiting or diarrhea  Tele Events:     Vitals:   Temp:  [97 7 °F (36 5 °C)-99 2 °F (37 3 °C)] 97 7 °F (36 5 °C)  HR:  [75-82] 78  Resp:  [18-22] 22  BP: (157-179)/(74-92) 179/92  Weight (last 2 days)     None        SpO2: SpO2: 94 %, SpO2 Device: O2 Device: Nasal cannula    IV Infusions:    sodium chloride 75 mL/hr Last Rate: 75 mL/hr (01/02/18 1219)       Nutrition:        Diet Orders            Start     Ordered    12/29/17 1545  Diet Regular; Regular House  Diet effective now     Question Answer Comment   Diet Type Regular    Regular Regular House    RD to adjust diet per protocol?  Yes        12/29/17 1544    12/29/17 1507  Room Service  Once     Question:  Type of Service  Answer:  Room Service-Appropriate    12/29/17 1506          Ins/Outs:   I/O       12/31 0701 - 01/01 0700 01/01 0701 - 01/02 0700 01/02 0701 - 01/03 0700    I V  (mL/kg) 2000 (24 9) 1876 3 (23 4) 1000 (12 5)    Total Intake(mL/kg) 2000 (24 9) 1876 3 (23 4) 1000 (12 5)    Net +2000 +1876 3 +1000                 Lines/Drains:  Invasive Devices     Peripheral Intravenous Line            Peripheral IV 12/29/17 Right Wrist 4 days                 Active medications:  Scheduled Meds:  amLODIPine 5 mg Oral QAM   enoxaparin 40 mg Subcutaneous Daily   fluticasone-salmeterol 1 puff Inhalation Q12H [place]   guaiFENesin 600 mg Oral Q12H [address]   ipratropium-albuterol 3 mL Nebulization Q4H   melatonin 3 mg Oral HS   methylPREDNISolone sodium succinate 20 mg Intravenous Q12H [place]   pantoprazole 20 mg Oral Early Morning   tamsulosin 0 4 mg Oral HS     PRN Meds:  acetaminophen 650 mg Q6H PRN   benzonatate 100 mg TID PRN   hydrocodone-chlorpheniramine polistirex 5 mL Q12H PRN   ipratropium-albuterol 3 mL Q2H PRN     ____________________________________________________________________      Physical Exam   Constitutional: He is oriented to person, place, and time  He appears well-developed and well-nourished  HENT:   Head: Normocephalic and atraumatic  Mouth/Throat: Oropharynx is clear and moist  No oropharyngeal exudate (Erythema)  Eyes: EOM are normal  Pupils are equal, round, and reactive to light  Neck: Normal range of motion  Neck supple  Cardiovascular: Normal rate and regular rhythm  No murmur heard  Pulmonary/Chest: Effort normal  No respiratory distress  Minimal end-expiratory wheeze   Abdominal: Soft  Bowel sounds are normal  He exhibits no distension  There is tenderness  There is guarding  There is no rebound  Musculoskeletal: Normal range of motion  He exhibits no edema  Neurological: He is alert and oriented to person, place, and time  Skin: Skin is warm and dry  Psychiatric: He has a normal mood and affect  His behavior is normal    Vitals reviewed            ____________________________________________________________________    Labs:   CBC:   Results from last 7 days  Lab Units 01/02/18  0541 01/01/18  0452 12/31/17  0621   WBC Thousand/uL 7 20 6 60 6 60   HEMOGLOBIN g/dL 13 4* 12 9* 12 8*   HEMATOCRIT % 38 6* 37 4* 37 7*   MCV fL 96 97 97   PLATELETS Thousands/uL 207 200 195     CMP:   Results from last 7 days  Lab Units 01/02/18  0540 01/01/18  0452 12/31/17  0621  12/29/17  1014   SODIUM mmol/L 138 136 137  < > 137   POTASSIUM mmol/L 4 1 4 2 4 3  < > 4 3   CHLORIDE mmol/L 103 104 105  < > 104   CO2 mmol/L 30 27 25  < > 24   BUN mg/dL 18 17 15  < > 14   CREATININE mg/dL 1 00 0 95 0 98  < > 1 39*   GLUCOSE RANDOM mg/dL 116 111 124  < > 118   CALCIUM mg/dL 8 5 8 2* 8 2*  < > 8 9   AST U/L  --   --   --   --  29   ALT U/L  --   --   --   --  32   ALK PHOS U/L  --   --   --   --  76   TOTAL PROTEIN g/dL  --   --   --   --  7 3   ALBUMIN g/dL  --   --   --   --  3 5   BILIRUBIN TOTAL mg/dL  --   --   --   --  1 50*   EGFR ml/min/1 73sq m 74 79 76  < > 50   < > = values in this interval not displayed  Magnesium:   Results from last 7 days  Lab Units 01/02/18  0540   MAGNESIUM mg/dL 1 9     Phosphorous:   Results from last 7 days  Lab Units 01/02/18  0540   PHOSPHORUS mg/dL 3 3     Troponin:   Results from last 7 days  Lab Units 12/29/17  1014   TROPONIN I ng/mL <0 02     PT/INR:   Results from last 7 days  Lab Units 12/29/17  1014   PTT seconds 25   INR  1 07     Lactic Acid:   Results from last 7 days  Lab Units 12/29/17  1325 12/29/17  1014   LACTIC ACID mmol/L 2 0 3 0*     BNP:   Results from last 7 days  Lab Units 12/29/17  1014   NT-PRO BNP pg/mL 249*     TSH:         Imaging: All imaging is reviewed by me      Micro: Lab Results   Component Value Date    BLOODCX No Growth After 4 Days  12/29/2017    BLOODCX No Growth After 4 Days   12/29/2017    MRSACULTURE  12/29/2017     No Methicillin Resistant Staphlyococcus aureus (MRSA) isolated              Code Status: Level 1 - Full Code

## 2018-01-02 NOTE — CONSULTS
Consultation - [facility]) [facility]  Jessica Hickman 67 y o  male MRN: [id number]  Unit/Bed#: [address] [place] [address] Encounter: [id number]        Consults    Reason for Consult / Principal Problem: Abdominal pain    HPI: 74-year-old male with history of COPD, Alpha-1 antitrypsin deficiency, Hypertension was admitted 4 days ago because of respiratory distress  He is being treated for COPD exacerbation  He is having problems with constipation since admission  He also complains about left lower quadrant pain when he is coughing and trying to get up  He feels better when he is resting In bed  Good appetite  No recent weight loss  He has problems with acid reflux and takes omeprazole  REVIEW OF SYSTEMS:     CONSTITUTIONAL: Denies any fever, chills, or rigors  Good appetite, and no recent weight loss  HEENT: No earache or tinnitus  Denies hearing loss or visual disturbances  CARDIOVASCULAR: No chest pain or palpitations  RESPIRATORY: Complaining of cough, Denies any hemoptysis, shortness of breath or dyspnea on exertion  GASTROINTESTINAL: As noted in the History of Present Illness  GENITOURINARY: No problems with urination  Denies any hematuria or dysuria  NEUROLOGIC: No dizziness or vertigo, denies headaches  MUSCULOSKELETAL: Denies any muscle or joint pain  SKIN: Denies skin rashes or itching  ENDOCRINE: Denies excessive thirst  Denies intolerance to heat or cold  PSYCHOSOCIAL: Denies depression or anxiety  Denies any recent memory loss  Historical Information   Past Medical History:   Diagnosis Date    Anesthesia complication     Difficult to wake up    Arthritis     BPH (benign prostatic hyperplasia)     urinary frequency    Cancer (HCC)     basal cell neck, face    COPD (chronic obstructive pulmonary disease) (HCC)     Full dentures     Hypertension     controlled    Kidney stone     at least 7 episodes    Liver disease     Alpha 1- enzyme deficiency - diagnosed 2002   has been on weekly replacement therapy since then    Pulmonary emphysema ([facility] [address] )     1/25/15  FEV1 - 2 45 liters or 59% of predicted    Wears glasses     for driving only     Past Surgical History:   Procedure Laterality Date    BACK SURGERY  2008    discectomy    COLONOSCOPY      COLONOSCOPY N/A 3/10/2017    Procedure: Luceroradhakatelyn Valdovinos;  Surgeon: Emilia Aleman MD;  Location: [facility] GI LAB; Service:    Precilla Enzo      removal of kidney stones    ESOPHAGOGASTRODUODENOSCOPY N/A 3/10/2017    Procedure: ESOPHAGOGASTRODUODENOSCOPY (EGD); Surgeon: Emilia Aleman MD;  Location: [facility] GI LAB;   Service:     LITHOTRIPSY      TONSILLECTOMY      VEIN LIGATION AND STRIPPING Bilateral     1980's     Social History   History   Alcohol Use No     History   Drug Use No     History   Smoking Status    Former Smoker    Packs/day: 1 00    Years: 60 00   Smokeless Tobacco    Never Used     Comment: quit in august 2017     Family History   Problem Relation Age of Onset    Emphysema Mother      never smoked    Emphysema Father     Cancer Brother      colon       Meds/Allergies     Prescriptions Prior to Admission   Medication    albuterol (PROVENTIL HFA,VENTOLIN HFA) 90 mcg/act inhaler    Alpha1-Proteinase Inhibitor (ZEMAIRA IV)    amLODIPine (NORVASC) 5 mg tablet    azelastine (ASTELIN) 0 1 % nasal spray    diphenhydrAMINE (SOMINEX) 25 MG tablet    fluticasone-salmeterol (ADVAIR) 250-50 mcg/dose inhaler    Omeprazole Magnesium (PRILOSEC OTC PO)    tamsulosin (FLOMAX) 0 4 mg    tiotropium (SPIRIVA HANDIHALER) 18 mcg inhalation capsule     Current Facility-Administered Medications   Medication Dose Route Frequency    acetaminophen (TYLENOL) tablet 650 mg  650 mg Oral Q6H PRN    amLODIPine (NORVASC) tablet 5 mg  5 mg Oral QAM    benzonatate (TESSALON PERLES) capsule 100 mg  100 mg Oral TID PRN    enoxaparin (LOVENOX) subcutaneous injection 40 mg  40 mg Subcutaneous Daily    fluticasone-salmeterol (ADVAIR) 250-50 mcg/dose inhaler 1 puff  1 puff Inhalation Q12H [facility]    guaiFENesin (MUCINEX) 12 hr tablet 600 mg  600 mg Oral Q12H [facility]    hydrocodone-chlorpheniramine polistirex (TUSSIONEX) 10-8 mg/5 mL ER suspension 5 mL  5 mL Oral Q12H PRN    ipratropium-albuterol (DUO-NEB) 0 5-2 5 mg/mL inhalation solution 3 mL  3 mL Nebulization Q4H    ipratropium-albuterol (DUO-NEB) 0 5-2 5 mg/mL inhalation solution 3 mL  3 mL Nebulization Q2H PRN    melatonin tablet 3 mg  3 mg Oral HS    methylPREDNISolone sodium succinate (Solu-MEDROL) injection 20 mg  20 mg Intravenous Q12H [facility]    pantoprazole (PROTONIX) EC tablet 20 mg  20 mg Oral Early Morning    sodium chloride 0 9 % infusion  75 mL/hr Intravenous Continuous    tamsulosin (FLOMAX) capsule 0 4 mg  0 4 mg Oral HS       Allergies   Allergen Reactions    Chantix [Varenicline]      Caused prostate infection           Objective     Blood pressure (!) 179/92, pulse 78, temperature 97 7 °F (36 5 °C), temperature source Oral, resp  rate 22, height 6' 6" (1 981 m), weight 80 2 kg (176 lb 14 4 oz), SpO2 93 %  Intake/Output Summary (Last 24 hours) at 01/02/18 1835  Last data filed at 01/02/18 1219   Gross per 24 hour   Intake          1876 25 ml   Output                0 ml   Net          1876 25 ml         PHYSICAL EXAM:      General Appearance:   Alert, cooperative, no distress, appears stated age    HEENT:   Normocephalic, atraumatic, anicteric      Neck:  Supple, symmetrical, trachea midline, no adenopathy;    thyroid: no enlargement/tenderness/nodules; no carotid  bruit or JVD    Lungs:   Clear to auscultation bilaterally; no rales, rhonchi or wheezing; respirations unlabored    Heart[de-identified]   S1 and S2 normal; regular rate and rhythm; no murmur, rub, or gallop     Abdomen:   Soft, Mild tenderness in the left lower quadrant, non-distended; normal bowel sounds; no masses, no organomegaly    Genitalia:   Deferred    Rectal:   Deferred    Extremities:  No cyanosis, clubbing or edema  Pulses:  2+ and symmetric all extremities    Skin:  Skin color, texture, turgor normal, no rashes or lesions    Lymph nodes:  No palpable cervical, axillary or inguinal lymphadenopathy        Lab Results:   Admission on 12/29/2017   Component Date Value    WBC 12/29/2017 6 80     RBC 12/29/2017 4 30*    Hemoglobin 12/29/2017 14 4     Hematocrit 12/29/2017 41 8*    MCV 12/29/2017 97     MCH 12/29/2017 33 4*    MCHC 12/29/2017 34 3     RDW 12/29/2017 13 6     MPV 12/29/2017 6 4*    Platelets 45/10/7566 210     nRBC 12/29/2017 0     Neutrophils Relative 12/29/2017 74     Lymphocytes Relative 12/29/2017 15     Monocytes Relative 12/29/2017 8     Eosinophils Relative 12/29/2017 4     Basophils Relative 12/29/2017 0     Neutrophils Absolute 12/29/2017 5 00     Lymphocytes Absolute 12/29/2017 1 00     Monocytes Absolute 12/29/2017 0 50     Eosinophils Absolute 12/29/2017 0 30     Basophils Absolute 12/29/2017 0 00     Protime 12/29/2017 11 2     INR 12/29/2017 1 07     PTT 12/29/2017 25     Sodium 12/29/2017 137     Potassium 12/29/2017 4 3     Chloride 12/29/2017 104     CO2 12/29/2017 24     Anion Gap 12/29/2017 9     BUN 12/29/2017 14     Creatinine 12/29/2017 1 39*    Glucose 12/29/2017 118     Calcium 12/29/2017 8 9     AST 12/29/2017 29     ALT 12/29/2017 32     Alkaline Phosphatase 12/29/2017 76     Total Protein 12/29/2017 7 3     Albumin 12/29/2017 3 5     Total Bilirubin 12/29/2017 1 50*    eGFR 12/29/2017 50     Troponin I 12/29/2017 <0 02     NT-proBNP 12/29/2017 249*    Blood Culture 12/29/2017 No Growth After 4 Days   Blood Culture 12/29/2017 No Growth After 4 Days       LACTIC ACID 12/29/2017 3 0*    Ventricular Rate 12/29/2017 90     Atrial Rate 12/29/2017 90     [place] Interval 12/29/2017 170     QRSD Interval 12/29/2017 98     QT Interval 12/29/2017 354     QTC Interval 12/29/2017 433     P Axis 12/29/2017 49     QRS Axis 12/29/2017 50     T Wave Axis 12/29/2017 15     LACTIC ACID 12/29/2017 2 0     MRSA Culture Only 12/29/2017 No Methicillin Resistant Staphlyococcus aureus (MRSA) isolated     Legionella Urinary Antig* 12/29/2017 Negative     Strep pneumoniae antigen* 12/29/2017 Negative     INFLU A PCR 12/29/2017 None Detected     INFLU B PCR 12/29/2017 None Detected     RSV PCR 12/29/2017 Detected*    Sodium 12/30/2017 139     Potassium 12/30/2017 4 1     Chloride 12/30/2017 106     CO2 12/30/2017 22     Anion Gap 12/30/2017 11     BUN 12/30/2017 13     Creatinine 12/30/2017 1 11     Glucose 12/30/2017 131     Calcium 12/30/2017 8 4     eGFR 12/30/2017 66     Magnesium 12/30/2017 1 9     WBC 12/30/2017 5 70     RBC 12/30/2017 4 01*    Hemoglobin 12/30/2017 13 2*    Hematocrit 12/30/2017 38 7*    MCV 12/30/2017 96     MCH 12/30/2017 32 9*    MCHC 12/30/2017 34 2     RDW 12/30/2017 13 5     Platelets 09/24/9209 204     MPV 12/30/2017 6 0*    Phosphorus 12/30/2017 2 6     Sodium 12/31/2017 137     Potassium 12/31/2017 4 3     Chloride 12/31/2017 105     CO2 12/31/2017 25     Anion Gap 12/31/2017 7     BUN 12/31/2017 15     Creatinine 12/31/2017 0 98     Glucose 12/31/2017 124     Calcium 12/31/2017 8 2*    eGFR 12/31/2017 76     Magnesium 12/31/2017 1 8     WBC 12/31/2017 6 60     RBC 12/31/2017 3 89*    Hemoglobin 12/31/2017 12 8*    Hematocrit 12/31/2017 37 7*    MCV 12/31/2017 97     MCH 12/31/2017 32 9*    MCHC 12/31/2017 34 0     RDW 12/31/2017 13 8     Platelets 97/54/2046 195     MPV 12/31/2017 6 6*    Phosphorus 12/31/2017 3 0     Sodium 01/01/2018 136     Potassium 01/01/2018 4 2     Chloride 01/01/2018 104     CO2 01/01/2018 27     Anion Gap 01/01/2018 5     BUN 01/01/2018 17     Creatinine 01/01/2018 0 95     Glucose 01/01/2018 111     Calcium 01/01/2018 8 2*    eGFR 01/01/2018 79     Magnesium 01/01/2018 1 9     WBC 01/01/2018 6 60     RBC 01/01/2018 3 87*    Hemoglobin 01/01/2018 12 9*  Hematocrit 01/01/2018 37 4*    MCV 01/01/2018 97     MCH 01/01/2018 33 3*    MCHC 01/01/2018 34 5     RDW 01/01/2018 13 9     Platelets 66/30/1609 200     MPV 01/01/2018 6 6*    Phosphorus 01/01/2018 3 2     Sodium 01/02/2018 138     Potassium 01/02/2018 4 1     Chloride 01/02/2018 103     CO2 01/02/2018 30     Anion Gap 01/02/2018 5     BUN 01/02/2018 18     Creatinine 01/02/2018 1 00     Glucose 01/02/2018 116     Calcium 01/02/2018 8 5     eGFR 01/02/2018 74     WBC 01/02/2018 7 20     RBC 01/02/2018 4 04*    Hemoglobin 01/02/2018 13 4*    Hematocrit 01/02/2018 38 6*    MCV 01/02/2018 96     MCH 01/02/2018 33 1*    MCHC 01/02/2018 34 6     RDW 01/02/2018 13 3     MPV 01/02/2018 6 5*    Platelets 67/75/2305 207     nRBC 01/02/2018 0     Neutrophils Relative 01/02/2018 79*    Lymphocytes Relative 01/02/2018 13*    Monocytes Relative 01/02/2018 8     Eosinophils Relative 01/02/2018 0     Basophils Relative 01/02/2018 0     Neutrophils Absolute 01/02/2018 5 70     Lymphocytes Absolute 01/02/2018 0 90     Monocytes Absolute 01/02/2018 0 60     Eosinophils Absolute 01/02/2018 0 00     Basophils Absolute 01/02/2018 0 00     Magnesium 01/02/2018 1 9     Phosphorus 01/02/2018 3 3        Imaging Studies: I have personally reviewed pertinent imaging studies  Xr Chest 1 View Portable    Result Date: 12/29/2017  Impression: Chronic changes  Scarring or atelectasis at the right lung base  Workstation performed: [id number]     Xr Chest Pa & Lateral    Result Date: 12/30/2017  Impression: COPD  Clear lungs  Workstation performed: [id number]     Xr Abdomen 1 View Kub    Result Date: 1/2/2018  Impression: No bowel obstruction   Workstation performed: [id number]       ASSESSMENT/ PLAN:    Principal Problem:    SOB (shortness of breath)  Active Problems:    AAT (alpha-1-antitrypsin) deficiency (HCC)    Chronic obstructive pulmonary disease (HCC)    Causalgia of lower limb Gastroesophageal reflux disease without esophagitis    COPD exacerbation (HCC)    HTN (hypertension)    BPH (benign prostatic hyperplasia)    RSV infection    Liver disease      #1  Left lower quadrant painappeared to be most likely musculoskeletal pain as his pain is only with coughing and straining  Doubt for any intra-abdominal pathology including acute diverticulitis  Abdominal x-ray was reviewed which showed nonobstructive bowel gas pattern  - Continue the treatment for COPD exacerbation    - MiraLAX and stool softeners  - If he continues to have symptoms consider CT scan abdomen and pelvis  Discussed with family medicine residents

## 2018-01-02 NOTE — PROGRESS NOTES
[address] [facility] [address] [facility] Practice Progress Note - Yun Gonzalez 67 y o  male MRN: [id number]    Unit/Bed#: [address] [place] [address] Encounter: [id number]      Assessment/Plan:  New-onset abdominal pain with distention  -Likely secondary to straining from coughing vs  intraabdominal disease process vs  Constipation  -patient started on miralax  -KUB pending  -GI consulted  Shortness of breath likely secondary to CPOD exacerbation vs viral RSV infection  -CXR: chronic changes, scarring, atelectasis ar right lung base  -titrate O2 as needed, maintain sats above 92%  -Blood cultures pending  -Urine legionelle, strep penumo and flu negative  -RSV PCR detected  -duonebs q4h/q2h PRN  -Solumedrol 20 mg q12h  -pulmonology consulted  -sputum culture pending  -Home advair resumed  -Acapella valve ordered  -Guaifenesin 600 mg BID  Alpha-1 antitrypsin deficiency  -stable, continue with Zemaira weekly on tuesdays  -last dose 12/26/2017  -treatment postponed at this time  pulmonology consulted   COPD  -held home medications - Spiriva and albuterol  -restarted advair  -duonebs q6h  HTN  -stable, amlodipine 5mg qd  GERD  -stable, omeprazole 20 mg qd  BPH  -stable, flomax 0 4 mg qd  Global: regular diet, IV NS 75 c/hr, lovenox and SCDs       Subjective:   Patient seen and examined at the bedside  Reports new-onset abdominal pain, unsure if muscle strain from coughing or straining  Last bowel movement was on Saturday  Breathing is back to baseline    Objective:     Vitals: Blood pressure 162/89, pulse 82, temperature 98 2 °F (36 8 °C), temperature source Oral, resp  rate 18, height 6' 6" (1 981 m), weight 80 2 kg (176 lb 14 4 oz), SpO2 94 %  ,Body mass index is 20 44 kg/m²    Wt Readings from Last 3 Encounters:   12/29/17 80 2 kg (176 lb 14 4 oz)   03/08/17 79 4 kg (175 lb)       Intake/Output Summary (Last 24 hours) at 01/02/18 1103  Last data filed at 01/01/18 2325   Gross per 24 hour   Intake          1876 25 ml   Output                0 ml   Net 1876 25 ml       Physical Exam:   General: Pt is AAO x 3, not in any acute distress, O2 sat 96% on 2L O2  Cardio: RRR, S1 and S2 +, no murmurs/rubs/gallops/clicks  Resp: Decreased lung sounds with faint expiratory wheezes in lower lobes and few rhonchi  Abdomen: soft, NT/ND, BS+  Extremities: no cyanosis or edema  PP 2+ b/l        Recent Results (from the past 24 hour(s))   Basic metabolic panel    Collection Time: 01/02/18  5:40 AM   Result Value Ref Range    Sodium 138 136 - 145 mmol/L    Potassium 4 1 3 5 - 5 3 mmol/L    Chloride 103 100 - 108 mmol/L    CO2 30 21 - 32 mmol/L    Anion Gap 5 4 - 13 mmol/L    BUN 18 5 - 25 mg/dL    Creatinine 1 00 0 60 - 1 30 mg/dL    Glucose 116 65 - 140 mg/dL    Calcium 8 5 8 3 - 10 1 mg/dL    eGFR 74 ml/min/1 73sq m   Magnesium    Collection Time: 01/02/18  5:40 AM   Result Value Ref Range    Magnesium 1 9 1 6 - 2 6 mg/dL   Phosphorus    Collection Time: 01/02/18  5:40 AM   Result Value Ref Range    Phosphorus 3 3 2 3 - 4 1 mg/dL   CBC and differential    Collection Time: 01/02/18  5:41 AM   Result Value Ref Range    WBC 7 20 4 80 - 10 80 Thousand/uL    RBC 4 04 (L) 4 70 - 6 10 Million/uL    Hemoglobin 13 4 (L) 14 0 - 18 0 g/dL    Hematocrit 38 6 (L) 42 0 - 52 0 %    MCV 96 82 - 98 fL    MCH 33 1 (H) 27 0 - 31 0 pg    MCHC 34 6 31 4 - 37 4 g/dL    RDW 13 3 11 6 - 15 1 %    MPV 6 5 (L) 8 9 - 12 7 fL    Platelets 723 599 - 601 Thousands/uL    nRBC 0 /100 WBCs    Neutrophils Relative 79 (H) 43 - 75 %    Lymphocytes Relative 13 (L) 14 - 44 %    Monocytes Relative 8 4 - 12 %    Eosinophils Relative 0 0 - 6 %    Basophils Relative 0 0 - 1 %    Neutrophils Absolute 5 70 1 85 - 7 62 Thousands/µL    Lymphocytes Absolute 0 90 0 60 - 4 47 Thousands/µL    Monocytes Absolute 0 60 0 17 - 1 22 Thousand/µL    Eosinophils Absolute 0 00 0 00 - 0 61 Thousand/µL    Basophils Absolute 0 00 0 00 - 0 10 Thousands/µL       Current Facility-Administered Medications   Medication Dose Route Frequency Provider Last Rate Last Dose    acetaminophen (TYLENOL) tablet 650 mg  650 mg Oral Q6H PRN Cathryn George MD   650 mg at 01/02/18 0123    amLODIPine (NORVASC) tablet 5 mg  5 mg Oral QAM Cathryn George MD   5 mg at 01/02/18 0911    benzonatate (TESSALON PERLES) capsule 100 mg  100 mg Oral TID PRN Dave Sharp MD   100 mg at 01/02/18 0547    enoxaparin (LOVENOX) subcutaneous injection 40 mg  40 mg Subcutaneous Daily Cathryn George MD   40 mg at 01/02/18 0912    fluticasone-salmeterol (ADVAIR) 250-50 mcg/dose inhaler 1 puff  1 puff Inhalation Q12H [facility] Carmela Gold, DO   1 puff at 01/02/18 0912    guaiFENesin (MUCINEX) 12 hr tablet 600 mg  600 mg Oral Q12H [facility] Jose Long, DO   600 mg at 01/02/18 0911    hydrocodone-chlorpheniramine polistirex (TUSSIONEX) 10-8 mg/5 mL ER suspension 5 mL  5 mL Oral Q12H PRN Blanco Robles MD   5 mL at 01/02/18 0123    ipratropium-albuterol (DUO-NEB) 0 5-2 5 mg/mL inhalation solution 3 mL  3 mL Nebulization Q4H Cathryn George MD   3 mL at 01/02/18 0724    ipratropium-albuterol (DUO-NEB) 0 5-2 5 mg/mL inhalation solution 3 mL  3 mL Nebulization Q2H PRN Cathryn George MD   3 mL at 01/02/18 0207    melatonin tablet 3 mg  3 mg Oral HS Nelljoe Sahu, DO   3 mg at 01/01/18 2157    methylPREDNISolone sodium succinate (Solu-MEDROL) injection 20 mg  20 mg Intravenous Q12H [facility] Abel Blevins MD   20 mg at 01/02/18 0547    pantoprazole (PROTONIX) EC tablet 20 mg  20 mg Oral Early Morning Cathryn George MD   20 mg at 01/02/18 0547    polyethylene glycol (MIRALAX) packet 17 g  17 g Oral Once Terri Licona,         sodium chloride 0 9 % infusion  75 mL/hr Intravenous Continuous Cathryn George MD 75 mL/hr at 01/01/18 2327 75 mL/hr at 01/01/18 2327    tamsulosin (FLOMAX) capsule 0 4 mg  0 4 mg Oral HS Cathryn George MD   0 4 mg at 01/01/18 2157       Invasive Devices     Peripheral Intravenous Line            Peripheral IV 12/29/17 Right Wrist 4 days Krystle Cook MD

## 2018-01-03 ENCOUNTER — APPOINTMENT (INPATIENT)
Dept: RADIOLOGY | Facility: HOSPITAL | Age: 73
DRG: 190 | End: 2018-01-03
Payer: MEDICARE

## 2018-01-03 VITALS
DIASTOLIC BLOOD PRESSURE: 79 MMHG | HEIGHT: 78 IN | OXYGEN SATURATION: 94 % | HEART RATE: 80 BPM | BODY MASS INDEX: 20.47 KG/M2 | WEIGHT: 176.9 LBS | SYSTOLIC BLOOD PRESSURE: 173 MMHG | TEMPERATURE: 97.3 F | RESPIRATION RATE: 20 BRPM

## 2018-01-03 LAB
A1AT SERPL-MCNC: 97 MG/DL (ref 90–200)
ANION GAP SERPL CALCULATED.3IONS-SCNC: 6 MMOL/L (ref 4–13)
BACTERIA BLD CULT: NORMAL
BACTERIA BLD CULT: NORMAL
BUN SERPL-MCNC: 19 MG/DL (ref 5–25)
CALCIUM SERPL-MCNC: 8.7 MG/DL (ref 8.3–10.1)
CHLORIDE SERPL-SCNC: 102 MMOL/L (ref 100–108)
CO2 SERPL-SCNC: 28 MMOL/L (ref 21–32)
CREAT SERPL-MCNC: 1.03 MG/DL (ref 0.6–1.3)
ERYTHROCYTE [DISTWIDTH] IN BLOOD BY AUTOMATED COUNT: 13.8 % (ref 11.6–15.1)
GFR SERPL CREATININE-BSD FRML MDRD: 72 ML/MIN/1.73SQ M
GLUCOSE SERPL-MCNC: 123 MG/DL (ref 65–140)
HCT VFR BLD AUTO: 39.3 % (ref 42–52)
HGB BLD-MCNC: 13.5 G/DL (ref 14–18)
MAGNESIUM SERPL-MCNC: 2.1 MG/DL (ref 1.6–2.6)
MCH RBC QN AUTO: 33.1 PG (ref 27–31)
MCHC RBC AUTO-ENTMCNC: 34.3 G/DL (ref 31.4–37.4)
MCV RBC AUTO: 96 FL (ref 82–98)
PLATELET # BLD AUTO: 243 THOUSANDS/UL (ref 130–400)
PMV BLD AUTO: 6.4 FL (ref 8.9–12.7)
POTASSIUM SERPL-SCNC: 4.5 MMOL/L (ref 3.5–5.3)
RBC # BLD AUTO: 4.07 MILLION/UL (ref 4.7–6.1)
SODIUM SERPL-SCNC: 136 MMOL/L (ref 136–145)
WBC # BLD AUTO: 8.4 THOUSAND/UL (ref 4.8–10.8)

## 2018-01-03 PROCEDURE — 94760 N-INVAS EAR/PLS OXIMETRY 1: CPT

## 2018-01-03 PROCEDURE — 94640 AIRWAY INHALATION TREATMENT: CPT

## 2018-01-03 PROCEDURE — 80048 BASIC METABOLIC PNL TOTAL CA: CPT | Performed by: FAMILY MEDICINE

## 2018-01-03 PROCEDURE — 74177 CT ABD & PELVIS W/CONTRAST: CPT

## 2018-01-03 PROCEDURE — 85027 COMPLETE CBC AUTOMATED: CPT | Performed by: FAMILY MEDICINE

## 2018-01-03 PROCEDURE — 94668 MNPJ CHEST WALL SBSQ: CPT

## 2018-01-03 PROCEDURE — 83735 ASSAY OF MAGNESIUM: CPT | Performed by: FAMILY MEDICINE

## 2018-01-03 PROCEDURE — 94761 N-INVAS EAR/PLS OXIMETRY MLT: CPT

## 2018-01-03 RX ORDER — HYDROCODONE POLISTIREX AND CHLORPHENIRAMINE POLISTIREX 10; 8 MG/5ML; MG/5ML
5 SUSPENSION, EXTENDED RELEASE ORAL EVERY 12 HOURS PRN
Qty: 50 ML | Refills: 0
Start: 2018-01-03 | End: 2018-01-13

## 2018-01-03 RX ORDER — PREDNISONE 20 MG/1
20 TABLET ORAL DAILY
Status: DISCONTINUED | OUTPATIENT
Start: 2018-01-04 | End: 2018-01-03 | Stop reason: HOSPADM

## 2018-01-03 RX ORDER — PREDNISONE 10 MG/1
20 TABLET ORAL DAILY
Qty: 10 TABLET | Refills: 0
Start: 2018-01-03 | End: 2018-01-31

## 2018-01-03 RX ORDER — PREDNISONE 20 MG/1
20 TABLET ORAL DAILY
Qty: 20 TABLET | Refills: 0
Start: 2018-01-04 | End: 2018-02-14

## 2018-01-03 RX ORDER — TRAMADOL HYDROCHLORIDE 50 MG/1
50 TABLET ORAL EVERY 6 HOURS PRN
Qty: 10 TABLET | Refills: 0
Start: 2018-01-03 | End: 2018-01-13

## 2018-01-03 RX ADMIN — AMLODIPINE BESYLATE 5 MG: 5 TABLET ORAL at 09:29

## 2018-01-03 RX ADMIN — IPRATROPIUM BROMIDE AND ALBUTEROL SULFATE 3 ML: .5; 3 SOLUTION RESPIRATORY (INHALATION) at 00:14

## 2018-01-03 RX ADMIN — IPRATROPIUM BROMIDE AND ALBUTEROL SULFATE 3 ML: .5; 3 SOLUTION RESPIRATORY (INHALATION) at 03:12

## 2018-01-03 RX ADMIN — IPRATROPIUM BROMIDE AND ALBUTEROL SULFATE 3 ML: .5; 3 SOLUTION RESPIRATORY (INHALATION) at 09:10

## 2018-01-03 RX ADMIN — BENZONATATE 100 MG: 100 CAPSULE ORAL at 05:28

## 2018-01-03 RX ADMIN — IPRATROPIUM BROMIDE AND ALBUTEROL SULFATE 3 ML: .5; 3 SOLUTION RESPIRATORY (INHALATION) at 11:44

## 2018-01-03 RX ADMIN — PANTOPRAZOLE SODIUM 20 MG: 20 TABLET, DELAYED RELEASE ORAL at 05:28

## 2018-01-03 RX ADMIN — GUAIFENESIN 600 MG: 600 TABLET, EXTENDED RELEASE ORAL at 09:28

## 2018-01-03 RX ADMIN — IPRATROPIUM BROMIDE AND ALBUTEROL SULFATE 3 ML: .5; 3 SOLUTION RESPIRATORY (INHALATION) at 15:03

## 2018-01-03 RX ADMIN — ACETAMINOPHEN 650 MG: 325 TABLET, FILM COATED ORAL at 05:28

## 2018-01-03 RX ADMIN — HYDROCODONE POLISTIREX AND CHLORPHENIRAMINE POLISTIREX 5 ML: 10; 8 SUSPENSION, EXTENDED RELEASE ORAL at 17:02

## 2018-01-03 RX ADMIN — FLUTICASONE PROPIONATE AND SALMETEROL 1 PUFF: 50; 250 POWDER RESPIRATORY (INHALATION) at 09:29

## 2018-01-03 RX ADMIN — BENZONATATE 100 MG: 100 CAPSULE ORAL at 17:02

## 2018-01-03 RX ADMIN — METHYLPREDNISOLONE SODIUM SUCCINATE 20 MG: 40 INJECTION, POWDER, FOR SOLUTION INTRAMUSCULAR; INTRAVENOUS at 05:28

## 2018-01-03 RX ADMIN — SODIUM CHLORIDE 75 ML/HR: 0.9 INJECTION, SOLUTION INTRAVENOUS at 05:04

## 2018-01-03 RX ADMIN — METHYLPREDNISOLONE SODIUM SUCCINATE 20 MG: 40 INJECTION, POWDER, FOR SOLUTION INTRAMUSCULAR; INTRAVENOUS at 17:02

## 2018-01-03 RX ADMIN — IOHEXOL 100 ML: 350 INJECTION, SOLUTION INTRAVENOUS at 13:40

## 2018-01-03 RX ADMIN — ENOXAPARIN SODIUM 40 MG: 40 INJECTION SUBCUTANEOUS at 09:28

## 2018-01-03 RX ADMIN — IOHEXOL 50 ML: 240 INJECTION, SOLUTION INTRATHECAL; INTRAVASCULAR; INTRAVENOUS; ORAL at 11:55

## 2018-01-03 NOTE — PROGRESS NOTES
Progress Note - Jhon Bertrand 67 y o  male MRN: [id number]    Unit/Bed#: [address] [place] [address] Encounter: [id number]        Subjective:  Patient was seen and examined at bedside  Patient does not currently have any complaints  He did have a bowel movement yesterday afternoon that was nonbloody  Objective:     Vitals: Blood pressure 156/77, pulse 75, temperature 98 °F (36 7 °C), temperature source Oral, resp  rate 22, height 6' 6" (1 981 m), weight 80 2 kg (176 lb 14 4 oz), SpO2 99 %  ,Body mass index is 20 44 kg/m²        Intake/Output Summary (Last 24 hours) at 01/03/18 1029  Last data filed at 01/03/18 0503   Gross per 24 hour   Intake             1950 ml   Output                0 ml   Net             1950 ml       Physical Exam:     General Appearance: Alert, appears stated age and cooperative  Lungs: Clear to auscultation bilaterally, no rales or rhonchi, no labored breathing/accessory muscle use  Heart: Regular rate and rhythm, S1, S2 normal, no murmur, click, rub or gallop  Abdomen: Soft, non-distended; bowel sounds normal; no masses or no organomegaly, pain on palpation to LLQ  Extremities: No cyanosis, edema    Invasive Devices     Peripheral Intravenous Line            Peripheral IV 01/03/18 Left Antecubital less than 1 day                Lab Results:      Results from last 7 days  Lab Units 01/03/18  0513 01/02/18  0541   WBC Thousand/uL 8 40 7 20   HEMOGLOBIN g/dL 13 5* 13 4*   HEMATOCRIT % 39 3* 38 6*   PLATELETS Thousands/uL 243 207   NEUTROS PCT %  --  79*   LYMPHS PCT %  --  13*   MONOS PCT %  --  8   EOS PCT %  --  0       Results from last 7 days  Lab Units 01/03/18  0513  12/29/17  1014   SODIUM mmol/L 136  < > 137   POTASSIUM mmol/L 4 5  < > 4 3   CHLORIDE mmol/L 102  < > 104   CO2 mmol/L 28  < > 24   BUN mg/dL 19  < > 14   CREATININE mg/dL 1 03  < > 1 39*   CALCIUM mg/dL 8 7  < > 8 9   TOTAL PROTEIN g/dL  --   --  7 3   BILIRUBIN TOTAL mg/dL  --   --  1 50*   ALK PHOS U/L  --   --  76   ALT U/L  -- --  32   AST U/L  --   --  29   GLUCOSE RANDOM mg/dL 123  < > 118   < > = values in this interval not displayed  Results from last 7 days  Lab Units 12/29/17  1014   INR  1 07           Imaging Studies: I have personally reviewed pertinent imaging studies  Xr Chest 1 View Portable    Result Date: 12/29/2017  Impression: Chronic changes  Scarring or atelectasis at the right lung base  Workstation performed: [id number]     Xr Chest Pa & Lateral    Result Date: 12/30/2017  Impression: COPD  Clear lungs  Workstation performed: [id number]     Xr Abdomen 1 View Kub    Result Date: 1/2/2018  Impression: No bowel obstruction  Workstation performed: [id number]       ASSESMENT/ PLAN:   Principal Problem:    SOB (shortness of breath)  Active Problems:    AAT (alpha-1-antitrypsin) deficiency (HCC)    Chronic obstructive pulmonary disease (HCC)    Causalgia of lower limb    Gastroesophageal reflux disease without esophagitis    COPD exacerbation (HCC)    HTN (hypertension)    BPH (benign prostatic hyperplasia)    RSV infection    Liver disease      65 yo male currently being admitted COPD exacerbation  COPD Exacerbation   -c/w Solumedrol   -c/w duonebs   -oxygen as needed  -pulm consult     Abdominal Pain likely musculoskeletal pain from coughing and straining   -c/w with miralax and stool softeners     Alpha 1 antitrypsin def  -on weekly supplementation     GERD   -continue with omeprazole       Hypertension   Stable

## 2018-01-03 NOTE — RESPIRATORY THERAPY NOTE
Home Oxygen Qualifying Test       Patient name: Geovanny Sequeira        : 1945   Date of Test:  January 3, 2018  Diagnosis:      Home Oxygen Test:    **Medicare Guidelines require item(s) 1-5 on all ambulatory patients or 1 and 2 on on-ambulatory patients  1   Baseline SPO2 on Room Air at rest 92 %  If = or < 88% on room air add O2 via NC and titrate patient  Patient must be ambulated with O2 and titrated to > 88% with exertion  2   SPO2 on Oxygen at rest 95 %  3   SPO2 during exercise on Room Air 86 %  4   SPO2 during exercise on Oxygen  94% at a liter flow of 3 lpm     5   Exercise performed:    [x] Walking     [] Stairs     [] Duration 6(min )     [] Distance 500 (ft )       [x]  Supplemental Home Oxygen is indicated  []  Client does not qualify for home oxygen        Reuben Merino RT

## 2018-01-03 NOTE — CASE MANAGEMENT
Continued Stay Review    Date: 1/2/18  Vitals: Blood pressure 162/89, pulse 82, temperature 98 2 °F (36 8 °C), temperature source Oral, resp  rate 18, height 6' 6" (1 981 m), weight 80 2 kg (176 lb 14 4 oz), SpO2 94 %  ,Body mass index is 20 44 kg/m²  Medications:   Scheduled Meds:   amLODIPine 5 mg Oral QAM   enoxaparin 40 mg Subcutaneous Daily   fluticasone-salmeterol 1 puff Inhalation Q12H CORINA   guaiFENesin 600 mg Oral Q12H [address]   ipratropium-albuterol 3 mL Nebulization Q4H   melatonin 3 mg Oral HS   methylPREDNISolone sodium succinate 20 mg Intravenous Q12H CORINA   pantoprazole 20 mg Oral Early Morning   tamsulosin 0 4 mg Oral HS     Continuous Infusions:   sodium chloride 75 mL/hr Last Rate: 75 mL/hr (01/03/18 0515)     PRN Meds:   acetaminophen    benzonatate    hydrocodone-chlorpheniramine polistirex    ipratropium-albuterol    Abnormal Labs/Diagnostic Results:   HGB  13 4   KUB=No bowel obstruction    Age/Sex: 67 y o  male     Assessment/Plan:   New-onset abdominal pain with distention  -Likely secondary to straining from coughing vs  intraabdominal disease process vs  Constipation  -patient started on miralax  -KUB pending  -GI consulted  Shortness of breath likely secondary to CPOD exacerbation vs viral RSV infection  -CXR: chronic changes, scarring, atelectasis ar right lung base  -titrate O2 as needed, maintain sats above 92%  -Blood cultures pending  -Urine legionelle, strep penumo and flu negative  -RSV PCR detected  -duonebs q4h/q2h PRN  -Solumedrol 20 mg q12h  -pulmonology consulted  -sputum culture pending  -Home advair resumed  -Acapella valve ordered  -Guaifenesin 600 mg BID  Alpha-1 antitrypsin deficiency  -stable, continue with Zemaira weekly on tuesdays  -last dose 12/26/2017  -treatment postponed at this time  pulmonology consulted   COPD  -held home medications - Spiriva and albuterol  -restarted advair  -duonebs q6h  HTN  -stable, amlodipine 5mg qd  GERD  -stable, omeprazole 20 mg qd  BPH  -stable, flomax 0 4 mg qd  Global: regular diet, IV NS 75 c/hr, lovenox and SCDs        Subjective:   Patient seen and examined at the bedside  Reports new-onset abdominal pain, unsure if muscle strain from coughing or straining  Last bowel movement was on Saturday   Breathing is back to baseline    Discharge Plan: TBD

## 2018-01-03 NOTE — PROGRESS NOTES
Progress Note - Pulmonary   Madai Travis 67 y o  male MRN: [id number]  Unit/Bed#: [facility] 329-01 Encounter: [id number]      Assessment/Plan:     1  COPD exacerbation secondary to RSV infection and acute hypoxemic respiratory failure secondary to this  Wean O2 as tolerated  Maintain O2 sat greater than 88 to 90%  FEV1 of 59% at baseline  Can switch to prednisone taper starting with 20 mg  Home O2 evaluation-    Continue with nebulizer therapy     2  Alpha 1 antitrypsin deficiency on weekly supplementation     3  Abdominal pain likely secondary to coughing      4  Left lung base 4 mm pulmonary nodule on CT abd cuts  He did had lung screening CT in 01/2017- not quite evident on this CT  He will need repeat outpatient lung screening in approx 4 weeks  5  Hypertension stable    Diastolic dysfunction-grade 1  Mild PH based on echo 7/2017  GERD continue with omeprazole        ______________________________________________________________________    Subjective: Pt seen and examined at bedside  Still has pain in his abd  Minimal cough  Has not been walking around  Vitals:   Temp:  [97 3 °F (36 3 °C)-98 6 °F (37 °C)] 97 3 °F (36 3 °C)  HR:  [] 80  Resp:  [20-22] 20  BP: (156-176)/(77-89) 173/79  Weight (last 2 days)     None        SpO2: SpO2: 94 %, SpO2 Device: O2 Device: Nasal cannula    IV Infusions:    sodium chloride 75 mL/hr Last Rate: 75 mL/hr (01/03/18 0515)       Nutrition:        Diet Orders            Start     Ordered    12/29/17 1545  Diet Regular; Regular House  Diet effective now     Question Answer Comment   Diet Type Regular    Regular Regular House    RD to adjust diet per protocol?  Yes        12/29/17 1544    12/29/17 1507  Room Service  Once     Question:  Type of Service  Answer:  Room Service-Appropriate    12/29/17 1506          Ins/Outs:   I/O       01/01 0701 - 01/02 0700 01/02 0701 - 01/03 0700 01/03 0701 - 01/04 0700    I V  (mL/kg) 1876 3 (23 4) 1950 (24 3)     Total Intake(mL/kg) 1876 3 (23 4) 1950 (24 3)     Net +1876 3 +1950                   Lines/Drains:  Invasive Devices     Peripheral Intravenous Line            Peripheral IV 01/03/18 Left Antecubital less than 1 day                 Active medications:  Scheduled Meds:  amLODIPine 5 mg Oral QAM   enoxaparin 40 mg Subcutaneous Daily   fluticasone-salmeterol 1 puff Inhalation Q12H CORINA   guaiFENesin 600 mg Oral Q12H [address]   ipratropium-albuterol 3 mL Nebulization Q4H   melatonin 3 mg Oral HS   methylPREDNISolone sodium succinate 20 mg Intravenous Q12H CORINA   pantoprazole 20 mg Oral Early Morning   tamsulosin 0 4 mg Oral HS     PRN Meds:  acetaminophen 650 mg Q6H PRN   benzonatate 100 mg TID PRN   hydrocodone-chlorpheniramine polistirex 5 mL Q12H PRN   ipratropium-albuterol 3 mL Q2H PRN     ____________________________________________________________________      Physical Exam   Constitutional: He is oriented to person, place, and time  He appears well-developed and well-nourished  HENT:   Head: Normocephalic and atraumatic  Mouth/Throat: Oropharynx is clear and moist  No oropharyngeal exudate  Eyes: EOM are normal  Pupils are equal, round, and reactive to light  Neck: Normal range of motion  Neck supple  No tracheal deviation present  No thyromegaly present  Cardiovascular: Normal rate and regular rhythm  No murmur heard  Pulmonary/Chest: Effort normal and breath sounds normal    Abdominal: Soft  Bowel sounds are normal  He exhibits no distension  There is tenderness  Musculoskeletal: Normal range of motion  He exhibits no edema  Neurological: He is alert and oriented to person, place, and time  Skin: Skin is warm and dry  Psychiatric: He has a normal mood and affect  His behavior is normal    Vitals reviewed            ____________________________________________________________________    Labs:   CBC:   Results from last 7 days  Lab Units 01/03/18  0513 01/02/18  0541 01/01/18  0452   WBC Thousand/uL 8 40 7  20 6 60   HEMOGLOBIN g/dL 13 5* 13 4* 12 9*   HEMATOCRIT % 39 3* 38 6* 37 4*   MCV fL 96 96 97   PLATELETS Thousands/uL 243 207 200     CMP:   Results from last 7 days  Lab Units 01/03/18  0513 01/02/18  0540 01/01/18  0452  12/29/17  1014   SODIUM mmol/L 136 138 136  < > 137   POTASSIUM mmol/L 4 5 4 1 4 2  < > 4 3   CHLORIDE mmol/L 102 103 104  < > 104   CO2 mmol/L 28 30 27  < > 24   BUN mg/dL 19 18 17  < > 14   CREATININE mg/dL 1 03 1 00 0 95  < > 1 39*   GLUCOSE RANDOM mg/dL 123 116 111  < > 118   CALCIUM mg/dL 8 7 8 5 8 2*  < > 8 9   AST U/L  --   --   --   --  29   ALT U/L  --   --   --   --  32   ALK PHOS U/L  --   --   --   --  76   TOTAL PROTEIN g/dL  --   --   --   --  7 3   ALBUMIN g/dL  --   --   --   --  3 5   BILIRUBIN TOTAL mg/dL  --   --   --   --  1 50*   EGFR ml/min/1 73sq m 72 74 79  < > 50   < > = values in this interval not displayed  Magnesium:   Results from last 7 days  Lab Units 01/03/18  0513   MAGNESIUM mg/dL 2 1     Phosphorous:   Results from last 7 days  Lab Units 01/02/18  0540   PHOSPHORUS mg/dL 3 3     Troponin:   Results from last 7 days  Lab Units 12/29/17  1014   TROPONIN I ng/mL <0 02     PT/INR:   Results from last 7 days  Lab Units 12/29/17  1014   PTT seconds 25   INR  1 07     Lactic Acid:   Results from last 7 days  Lab Units 12/29/17  1325 12/29/17  1014   LACTIC ACID mmol/L 2 0 3 0*     BNP:   Results from last 7 days  Lab Units 12/29/17  1014   NT-PRO BNP pg/mL 249*     TSH:         Imaging: all imaging is reviewed by me      Micro: Lab Results   Component Value Date    BLOODCX No Growth After 5 Days  12/29/2017    BLOODCX No Growth After 5 Days  12/29/2017    MRSACULTURE  12/29/2017     No Methicillin Resistant Staphlyococcus aureus (MRSA) isolated      No results for input(s): INFLUAPCR, INFLUBPCR, RSVPCR in the last 72 hours      Invalid input(s): LEGIONELLAURINARYANTIGEN        Code Status: Level 1 - Full Code

## 2018-01-03 NOTE — DISCHARGE INSTRUCTIONS
COPD (Chronic Obstructive Pulmonary Disease)   WHAT YOU NEED TO KNOW:   Chronic obstructive pulmonary disease (COPD) is a lung disease that makes it hard for you to breathe  It is usually a result of lung damage caused by years of irritation and inflammation in your lungs  DISCHARGE INSTRUCTIONS:   Call 911 if:   · You feel lightheaded, short of breath, and have chest pain  Seek care immediately if:   · You are confused, dizzy, or feel faint  · Your arm or leg feels warm, tender, and painful  It may look swollen and red  · You cough up blood  Contact your healthcare provider if:   · You have more shortness of breath than usual      · You need more medicine than usual to control your symptoms  · You are coughing or wheezing more than usual      · You are coughing up more mucus, or it is a different color or has a different odor  · You gain more than 3 pounds in a week  · You have a fever, a runny or stuffy nose, and a sore throat, or other cold or flu symptoms  · Your skin, lips, or nails start to turn blue  · You have swelling in your legs or ankles  · You are very tired or weak for more than a day  · You notice changes in your mood, or changes in your ability to think or concentrate  · You have questions or concerns about your condition or care  Medicines:   · Medicines  may be used to open your airways, decrease swelling and inflammation in your lungs, or treat an infection  You may need 2 or more medicines  A short-acting medicine relieves symptoms quickly  Long-acting medicines will control or prevent symptoms  Ask for more information about the medicines you are given and how to use them safely  · Take your medicine as directed  Contact your healthcare provider if you think your medicine is not helping or if you have side effects  Tell him or her if you are allergic to any medicine  Keep a list of the medicines, vitamins, and herbs you take   Include the amounts, and when and why you take them  Bring the list or the pill bottles to follow-up visits  Carry your medicine list with you in case of an emergency  Help make breathing easier:   · Use pursed-lip breathing any time you feel short of breath  Take a deep breath in through your nose  Slowly breathe out through your mouth with your lips pursed for twice as long as you inhaled  You can also practice this breathing pattern while you bend, lift, climb stairs, or exercise  It slows down your breathing and helps move more air in and out of your lungs  · Do not smoke, and avoid others who smoke  Nicotine and other substances can cause lung irritation or damage and make it harder for you to breathe  Do not use e-cigarettes or smokeless tobacco  They still contain nicotine  Ask your healthcare provider for information if you currently smoke and need help to quit  For support and more information:  ¨ [employer]  Phone: 0- 529 - 006-7980  Web Address: [employer]      · Be aware of and avoid anything that makes your symptoms worse  Stay out of high altitudes and places with high humidity  Stay inside, or cover your mouth and nose with a scarf when you are outside during cold weather  Stay inside on days when air pollution or pollen counts are high  Do not use aerosol sprays such as deodorant, bug spray, and hair spray  Manage COPD and help prevent exacerbations:  COPD is a serious condition that gets worse over time  A COPD exacerbation means your symptoms suddenly get worse  It is important to prevent exacerbations  An exacerbation can cause more lung damage  COPD cannot be cured, but you can take action to feel better and prevent COPD exacerbations:  · Protect yourself from germs  Germs can get into your lungs and cause an infection  An infection in your lungs can create more mucus and make it harder to breathe   An infection can also create swelling in your airways and prevent air from getting in  You can decrease your risk for infection by doing the following:     [facility] your hands often with soap and water  Carry germ-killing gel with you  You can use the gel to clean your hands when soap and water are not available  ¨ Do not touch your eyes, nose, or mouth unless you have washed your hands first      ¨ Always cover your mouth when you cough  Cough into a tissue or your shirtsleeve so you do not spread germs from your hands  ¨ Try to avoid people who have a cold or the flu  If you are sick, stay away from others as much as possible  · Drink more liquids  This will help to keep your air passages moist and help you cough up mucus  Ask how much liquid to drink each day and which liquids are best for you  · Exercise daily  Exercise for at least 20 minutes each day to help increase your energy and decrease shortness of breath  Walking or riding a bike are good ways to exercise  Talk to your healthcare provider about the best exercise plan for you  · Ask about vaccines  Your healthcare provider may recommend that you get regular flu and pneumonia vaccines  Pneumonia can become life-threatening for a person who has COPD  Ask about other vaccines you may need  Ask your healthcare provider about the flu and pneumonia vaccines  All adults should get the flu (influenza) vaccine every year as soon as it becomes available  The pneumonia vaccine is given to adults aged 72 or older to prevent pneumococcal disease, such as pneumonia  Adults aged 23 to 59 years who are at high risk for pneumococcal disease also should get the pneumococcal vaccine  It may need to be repeated 1 or 5 years later  Pulmonary rehabilitation:  Your healthcare provider may recommend a program to help you manage your symptoms and improve your quality of life  It may include nutritional counseling and exercise to strengthen your lungs     Make decisions about your choices for future treatment:  Ask for information about advanced medical directives and living spring  These documents help you decide and write down your choices for treatment and end-of-life care  It is best to complete them when you feel well and can think clearly about your wishes  The information can then be kept for future use if you are in the hospital or become very ill  Follow up with your healthcare provider as directed: You may need more tests  Your healthcare provider may refer you to a pulmonary (lung) specialist  Write down your questions so you remember to ask them during your visits  © 2017 [facility] Information is for End User's use only and may not be sold, redistributed or otherwise used for commercial purposes  All illustrations and images included in CareNotes® are the copyrighted property of [employer]  or Tyrone Walsh  The above information is an  only  It is not intended as medical advice for individual conditions or treatments  Talk to your doctor, nurse or pharmacist before following any medical regimen to see if it is safe and effective for you

## 2018-01-04 ENCOUNTER — GENERIC CONVERSION - ENCOUNTER (OUTPATIENT)
Dept: OTHER | Facility: OTHER | Age: 73
End: 2018-01-04

## 2018-01-04 NOTE — NURSING NOTE
Iv access discontinued,  Discharge instructions discussed with patient, all questions answered  Prescriptions given to patient  Pt  Left 3N stable, all personal belongings at side

## 2018-01-05 ENCOUNTER — GENERIC CONVERSION - ENCOUNTER (OUTPATIENT)
Dept: OTHER | Facility: OTHER | Age: 73
End: 2018-01-05

## 2018-01-05 NOTE — SOCIAL WORK
CM RECEIVED EMAIL FROM DAVID DAWSON IN COPD PROGRAM  SHE SPOKE WITH THE PT RE: HIS POST DC STATUS AND PT INDICATED THAT WOULD BENEFIT FROM SOME SERVICES AT HOME      PT IS FROM [place], REFERRAL PLACED TO [facility] AT THE REQUEST OF COPD PROGRAM AND THE PT

## 2018-01-07 NOTE — DISCHARGE SUMMARY
[facility] Discharge Summary - Medical Nathaly Ignacio 67 y o  male MRN: [id number]    [facility]  Room / Bed: [facility] 53-* Encounter: [id number]    BRIEF OVERVIEW  Admitting Provider: Harlan Chapa MD  Discharge Provider: Dr Jasmeet Paulson MD  Discharge To:  home  Facility: none    Outpatient Follow-Up: [facility]  Things to address at first follow up visit: COPD, Alpha-1 antitrypsin deficiency, abdominal pain, new pulmonary nodule  Labs and results pending at discharge: none    Admission Date: 12/29/2017     Discharge Date: 1/3/2018  7:58 PM    Primary Discharge Diagnosis  Principal Problem:    SOB (shortness of breath)  Active Problems:    AAT (alpha-1-antitrypsin) deficiency ([facility] [address] )    Chronic obstructive pulmonary disease ([facility] [address] )    Causalgia of lower limb    Gastroesophageal reflux disease without esophagitis    COPD exacerbation (HCC)    HTN (hypertension)    BPH (benign prostatic hyperplasia)    RSV infection    Liver disease  Resolved Problems:    * No resolved hospital problems  *      Secondary Discharge Diagnoses  none  Consulting Providers   -Dr Jeffery Cushing  -Dr Sohail Palmer    Procedures Performed/Pertinent Test results  -CXR: chronic changes, scarring or atelectasis at right lung base  -KUB: no bowel obstruction  -CT abdomen and pelvis: mild fecal stasis, enlarged fatty liver, COPD lung bases bibasilar pulmonary scarring, 4mm pulmonary nodule (new)  -blood cultures x 2 : no growth at 5 days  Lactic acid: 3 0 - 2 0  RSV by PCR: positive  A-1 antitrypsin: 97    HPI  (copied form H&P) David Donovan is a 67year old male with a PMH of BPH, GERD, Hypertension, Alpha-1 Antitrypsin Deficiency, and COPD who presents to the ED with a chief complaint shortness of breath  As per patient, has been ongoing for quite some time now  Does not note any inciting events    Over the last 2 days, states that his breathing has gotten progressively worse  He notes to be weak, dizzy, and lightheaded  Patient also states a dry, moist, nonproductive cough  His breathing is worse on exertion and improves at rest/laying flat  Patient has not found relief with use of his home inhalers: Advair, Spiriva, and Albuterol  Patient notes a history of COPD  Does not use home oxygen  Former 1 PPD smoker  Quit in August 2017  Denies any fever, chills, chest pain, abdominal pain, nausea, vomiting, lower extremity swelling, sick contacts, and history of travel       ED Course:  Patient arrived in the ED  Given bolus NS, DuoNeb x3, Solumedrol 125mg, and Zithromax 500mg IV  CXR obtained  Patient stabilized and moved to the medical floor        Hospital Course    Shortness of breath likely secondary to CPOD exacerbation vs viral RSV infection: Patient was admitted to the floor, given Azythromycin 500 mg IV q24, Solumedrol 20 mg q12h, and duonebs q4h and q2h PRN  Oxygen was titrated as needed to keep O2 saturations above 92%  Patient continued to improve clinically, and when RSV PCR returned positive, antibiotics were discontinued  Home Advair was resumed, and pulmonology was consulted  Acapella valve, and tussionex was given as needed for cough  Ambulatory O2 indicated patient still required oxygen while ambulating, and patient was sent home on 2L O2 nasal cannula  Chest xray remained, and new pulmonary nodule on CT scan requires 1 year follow-up  Alpha-1 antitrypsin deficiency: stable, last dose of Zemaira on 12/26/2017  Weekly Tuesday dose held while hospitalized, and resumed on discharge,  New-onset abdominal pain with distention: during the hospitalization, the patient developed abdominal distention and left lower quadrant abdominal pain exacerbated by coughing  GI was consulted, and KUB was performed and normal  CT scan was WNL  Patient was given Mag ox, and felt relief after having a large bowel movement   He was discharged with tramadol as needed for pain    COPD: stable, spiriva and albuterol held, Advair restarted with duonebs q4h and q2h prn  HTN: stable, amlodipine 5mg qd  GERD: stable, omeprazole 20 mg qd  BPH: stable, flomax 0 4 mg qd    Physical Exam at Discharge  General: Pt is AAO x 3, not in any acute distress, O2 sat 96% on 2L   Cardio: RRR, S1 and S2 +, no murmurs/rubs/gallops/clicks  Resp: decreased breath sounds with faint expiratory wheezes in lower lobes  Abdomen: soft, NT/ND, BS+  Extremities: no cyanosis or edema  PP 2+ b/l  Medications   Prior to Admission medications    Medication Sig Start Date End Date Taking?  Authorizing Provider   albuterol (PROVENTIL HFA,VENTOLIN HFA) 90 mcg/act inhaler Inhale 2 puffs every 6 (six) hours as needed for wheezing    Historical Provider, MD   Alpha1-Proteinase Inhibitor (ZEMAIRA IV) Infuse into a venous catheter once a week On tuesday -1000mg /50 ml     Historical Provider, MD   amLODIPine (NORVASC) 5 mg tablet Take 5 mg by mouth every morning    Historical Provider, MD   azelastine (ASTELIN) 0 1 % nasal spray 1 spray into each nostril 2 (two) times a day Use in each nostril as directed    Historical Provider, MD   diphenhydrAMINE (SOMINEX) 25 MG tablet Take 25 mg by mouth daily at bedtime as needed for sleep    Historical Provider, MD   fluticasone-salmeterol (ADVAIR) 250-50 mcg/dose inhaler Inhale 1 puff every 12 (twelve) hours    Historical Provider, MD   hydrocodone-chlorpheniramine polistirex (TUSSIONEX) 10-8 mg/5 mL ER suspension Take 5 mL by mouth every 12 (twelve) hours as needed for cough for up to 10 days Earliest Fill Date: 1/3/18 Max Daily Amount: 10 mL 1/3/18 1/13/18  Terri Licona DO   Omeprazole Magnesium (PRILOSEC OTC PO) Take 20 mg by mouth every morning    Historical Provider, MD   predniSONE 10 mg tablet Take 2 tablets by mouth daily 20mg for 3 days, 10mg  for 3 days, 5mg  for 3 days 1/3/18   Terri Licona DO   predniSONE 20 mg tablet Take 1 tablet by mouth daily 1/4/18   Jany Blue Delicia Mar MD   tamsulosin (FLOMAX) 0 4 mg Take 0 4 mg by mouth daily at bedtime    Historical Provider, MD   tiotropium (SPIRIVA HANDIHALER) 18 mcg inhalation capsule Place 18 mcg into inhaler and inhale every morning    Historical Provider, MD   traMADol (ULTRAM) 50 mg tablet Take 1 tablet by mouth every 6 (six) hours as needed for moderate pain for up to 10 days 1/3/18 1/13/18  Terri Licona,          Allergies  Allergies   Allergen Reactions    Chantix [Varenicline]      Caused prostate infection       Diet restrictions: none  Activity restrictions: none  Code Status: Prior  Advance Directive and Living Will: <no information>  Power of :    POLST:      Discharge Condition: good      Discharge  Statement   I spent 35 minutes discharging the patient  This time was spent on the day of discharge  I had direct contact with the patient on the day of discharge  Additional documentation is required if more than 30 minutes were spent on discharge

## 2018-01-11 ENCOUNTER — ALLSCRIPTS OFFICE VISIT (OUTPATIENT)
Dept: OTHER | Facility: OTHER | Age: 73
End: 2018-01-11

## 2018-01-11 ENCOUNTER — GENERIC CONVERSION - ENCOUNTER (OUTPATIENT)
Dept: OTHER | Facility: OTHER | Age: 73
End: 2018-01-11

## 2018-01-11 NOTE — MISCELLANEOUS
Message  GI Reminder Recall Mack Pugh:   Date: 01/11/2017   Dear Gal Pages:     Review of our records shows you are due for the following: Colonoscopy  Please call the following office to schedule your appointment:   [id number] [place], [place] [address] [phone]  We look forward to hearing from you!      Sincerely,     [facility]      Signatures   Electronically signed by : Shireen Langley, ; Jan 11 2017  4:37PM EST                       (Author)

## 2018-01-12 VITALS
BODY MASS INDEX: 22.63 KG/M2 | RESPIRATION RATE: 18 BRPM | HEART RATE: 72 BPM | WEIGHT: 182 LBS | SYSTOLIC BLOOD PRESSURE: 168 MMHG | DIASTOLIC BLOOD PRESSURE: 70 MMHG | TEMPERATURE: 97.4 F | OXYGEN SATURATION: 95 % | HEIGHT: 75 IN

## 2018-01-12 VITALS
DIASTOLIC BLOOD PRESSURE: 78 MMHG | BODY MASS INDEX: 21.88 KG/M2 | RESPIRATION RATE: 18 BRPM | TEMPERATURE: 96.5 F | OXYGEN SATURATION: 98 % | HEART RATE: 86 BPM | HEIGHT: 75 IN | SYSTOLIC BLOOD PRESSURE: 155 MMHG | WEIGHT: 176 LBS

## 2018-01-13 VITALS
TEMPERATURE: 97.1 F | DIASTOLIC BLOOD PRESSURE: 80 MMHG | WEIGHT: 184.56 LBS | BODY MASS INDEX: 22.95 KG/M2 | OXYGEN SATURATION: 96 % | HEART RATE: 81 BPM | RESPIRATION RATE: 20 BRPM | HEIGHT: 75 IN | SYSTOLIC BLOOD PRESSURE: 146 MMHG

## 2018-01-13 VITALS
SYSTOLIC BLOOD PRESSURE: 130 MMHG | OXYGEN SATURATION: 97 % | DIASTOLIC BLOOD PRESSURE: 68 MMHG | HEART RATE: 78 BPM | BODY MASS INDEX: 22.13 KG/M2 | HEIGHT: 75 IN | TEMPERATURE: 96 F | RESPIRATION RATE: 20 BRPM | WEIGHT: 178 LBS

## 2018-01-14 VITALS
RESPIRATION RATE: 20 BRPM | DIASTOLIC BLOOD PRESSURE: 90 MMHG | HEART RATE: 81 BPM | BODY MASS INDEX: 22.26 KG/M2 | OXYGEN SATURATION: 97 % | WEIGHT: 179 LBS | HEIGHT: 75 IN | SYSTOLIC BLOOD PRESSURE: 150 MMHG

## 2018-01-14 VITALS
WEIGHT: 182 LBS | BODY MASS INDEX: 22.63 KG/M2 | SYSTOLIC BLOOD PRESSURE: 160 MMHG | RESPIRATION RATE: 18 BRPM | HEART RATE: 89 BPM | DIASTOLIC BLOOD PRESSURE: 74 MMHG | TEMPERATURE: 97.4 F | HEIGHT: 75 IN | OXYGEN SATURATION: 99 %

## 2018-01-14 VITALS
RESPIRATION RATE: 20 BRPM | DIASTOLIC BLOOD PRESSURE: 68 MMHG | HEART RATE: 98 BPM | WEIGHT: 182 LBS | OXYGEN SATURATION: 96 % | HEIGHT: 75 IN | TEMPERATURE: 97 F | BODY MASS INDEX: 22.63 KG/M2 | SYSTOLIC BLOOD PRESSURE: 128 MMHG

## 2018-01-14 VITALS — OXYGEN SATURATION: 98 %

## 2018-01-14 NOTE — PROGRESS NOTES
Assessment    1  Alpha-1-antitrypsin deficiency (273 4) (E88 01)   2  Essential hypertension (401 9) (I10)   3  Esophageal reflux (530 81) (K21 9)   4  Myalgia (729 1) (M79 1)    Plan  Myalgia    · Follow-up visit in 1 month Evaluation and Treatment  Follow-up  Status: Hold For -  Scheduling  Requested for: 12Jan2016    Discussion/Summary    Will refer to gi for sx cont pres rx will await ct chest and pulm f/u if no labs ordered will order yearly as well as esr and cpk and aldolase instructions will need prevnar call if dev fever or inc sx  Chief Complaint  pt c/o pain in left leg  started about 4 weeks ago and getting progressively worse  History of Present Illness  HPI: Pt p/w myalgias for the past 4wks, and cough for the same time with minimal sputum whitish color and the symptoms are worst with cough Pt reports that he feels congested  Pt reports that he has chills, sob, and tired  Pt reports that he did get the flu shot this year (12/31/2015)  Pt does report sick contact  Pt smokes around 1/2 a pack      Review of Systems    Constitutional: as noted in HPI    ENT: no complaints of earache, no loss of hearing, no nosebleeds or nasal discharge, no sore throat or hoarseness  Cardiovascular: no complaints of slow or fast heart rate, no chest pain, no palpitations, no leg claudication or lower extremity edema  Respiratory: as noted in HPI  Gastrointestinal: as noted in HPI  Genitourinary: no complaints of dysuria or incontinence, no hesitancy, no nocturia, no genital lesion, no inadequacy of penile erection  Musculoskeletal: as noted in HPI  Integumentary: no complaints of skin rash or lesion, no itching or dry skin, no skin wounds  Neurological: no complaints of headache, no confusion, no numbness or tingling, no dizziness or fainting  Preventive Quality 65 and Older: Falls Risk:    0    The patient currently has no urinary incontinence symptoms  Active Problems    1  Alpha-1-antitrypsin deficiency (273 4) (E88 01)   2  Arthritis (716 90) (M19 90)   3  Benign colon polyp (211 3) (K63 5)   4  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)   5  Cataracts, both eyes (366 9) (H26 9)   6  Chronic diarrhea of unknown origin (787 91) (K52 9)   7  Chronic obstructive pulmonary disease (496) (J44 9)   8  Family history of Colon cancer   9  Complex Regional Pain Syndrome Type II Of Lower Limb (355 71)   10  Encounter for prostate cancer screening (V76 44) (Z12 5)   11  Encounter for smoking cessation counseling (V65 42,305 1) (Z71 6,Z72 0)   12  Esophageal reflux (530 81) (K21 9)   13  Essential hypertension (401 9) (I10)   14  Flu vaccine need (V04 81) (Z23)   15  Gastric ulcer (531 90) (K25 9)   16  Skin lesion of face (709 9) (L98 9)   17  Thyroid disorder (246 9) (E07 9)    Family History    1  No pertinent family history    2  Family history of Colon cancer    3  Family history of colitis (V18 59) (Z83 79)   4  Denied: Family history of colon cancer   5  Denied: Family history of Crohn's disease   6  Family history of liver disease (V18 59) (Z83 79)    Social History    · Denied: History of Alcohol Use (History)   · Current Every Day Smoker (305 1)   · Denied: History of Drug Use  The social history was reviewed and is unchanged  Surgical History    1  History of Back Surgery    Current Meds   1  Alpha1-Proteinase Inhibitor 1000 MG/50ML SOLN;   Therapy: (Recorded:23Jan2014) to Recorded   2  AmLODIPine Besylate 5 MG Oral Tablet; take 1 tablet by mouth daily; Therapy: 55Wrw2469 to (Evaluate:95Rjp0922)  Requested for: 56Pfe3148; Last   Rx:82Txd1791 Ordered   3  Nasonex 50 MCG/ACT Nasal Suspension; Therapy: ([phone]) to Recorded   4  Omeprazole 40 MG Oral Capsule Delayed Release; TAKE 1 CAPSULE BY MOUTH   TWICE DAILY; Therapy: 62ILQ8992 to (Evaluate:10Nov2015)  Requested for: 43FDR3348; Last   Rx:63Axj4571 Ordered   5   ProAir  (90 Base) MCG/ACT Inhalation Aerosol Solution; Therapy: ([phone]) to Recorded   6  Spiriva HandiHaler 18 MCG Inhalation Capsule; Therapy: ([phone]) to Recorded   7  Symbicort 80-4 5 MCG/ACT Inhalation Aerosol; Therapy: (348 400 880) to Recorded   8  Tamsulosin HCl - 0 4 MG Oral Capsule; 1 PO OD; Therapy: 34FML9370 to (Last Rx:62Bqe9576) Ordered   9  Zemaira 1000 MG Intravenous Solution Reconstituted; Therapy: (Recorded:09Apr2015) to Recorded    The medication list was reviewed and updated today  Allergies    1  No Known Drug Allergies    2  No Known Latex Allergies    Vitals   Recorded: 12Jan2016 09:03AM   Temperature 96 6 F   Heart Rate 86   Respiration 20   Systolic 435   Diastolic 80   Height 6 ft 3 in   Weight 174 lb    BMI Calculated 21 75   BSA Calculated 2 07   O2 Saturation 97     Physical Exam    Constitutional   General appearance: Abnormal   thin  Eyes   Conjunctiva and lids: No swelling, erythema, or discharge  Ears, Nose, Mouth, and Throat   External inspection of ears and nose: Normal     Otoscopic examination: Tympanic membrance translucent with normal light reflex  Canals patent without erythema  Oropharynx: Normal with no erythema, edema, exudate or lesions  Pulmonary   Auscultation of lungs: Abnormal   dec bs bessie  Cardiovascular   Auscultation of heart: Normal rate and rhythm, normal S1 and S2, without murmurs  Examination of extremities for edema and/or varicosities: Normal     Carotid pulses: Normal     Abdomen   Abdomen: Non-tender, no masses  Lymphatic   Palpation of lymph nodes in neck: No lymphadenopathy  Musculoskeletal tenderness several muscle groups  Skin   Skin and subcutaneous tissue: Normal without rashes or lesions  Neurologic no focl findings     Psychiatric   Orientation to person, place and time: Normal     Mood and affect: Normal          Signatures   Electronically signed by : DAVID Fischer ; Jan 12 2016 12:38PM EST (Author)

## 2018-01-16 NOTE — RESULT NOTES
Message    Esophageal biopsies are benign  Patient to continue omeprazole  Colon polyp removed came back as benign hyperplastic area  Repeat colonoscopy in 5 years with office visit first     Discussed with patient  Reminder set  CS         Verified Results  (1) TISSUE EXAM [id number] 10:09AM Teagan Fitch     Test Name Result Flag Reference   LAB AP CASE REPORT (Report)     Surgical Pathology Report             Case: [id number]                   Authorizing Provider: Warren Wilcox MD     Collected:      03/10/2017 1009        Ordering Location:   [facility]  Received:      03/10/2017 [facility]                                     Pathologist:      Melissa Taylor MD                              Specimens:  A) - Esophagus, distal esophagus                                    B) - Polyp, Colorectal, sigmoid polyp bx                                C) - Polyp, Colorectal, recto sigmoid polyp bx   LAB AP FINAL DIAGNOSIS (Report)     A  Distal esophagus:  - Benign mixed squamous mucosa & cardia-type columnar mucosa with   nonspecific hyperplastic, acute & chronic inflammatory change  - Eosinophils number fewer than 2 cells per high power field in squamous   mucosa  - No goblet cell [intestinal] metaplasia to suggest Hyatt's esophagus   - No epithelial dysplasia and no evidence of malignancy  B  Sigmoid colon polyp:  - Benign polypoid colonic mucosa with some features to suggest   hyperplastic polyp   - No epithelial dysplasia and no evidence of malignancy  C  Rectosigmoid colon polyp:  - Hyperplastic polyp   - No epithelial dysplasia and no evidence of malignancy  Interpretation performed at [facility]      Electronically signed by Melissa Taylor MD on 3/14/2017 at 6:31 PM   LAB AP SURGICAL ADDITIONAL INFORMATION (Report)     These tests were developed and their performance characteristics   determined by [facility] or [facility]  They may not be cleared or approved by the [place]  Food and   Drug Administration  The FDA has determined that such clearance or   approval is not necessary  These tests are used for clinical purposes  They should not be regarded as investigational or for research  This   laboratory has been approved by CLIA 88, designated as a high-complexity   laboratory and is qualified to perform these tests  LAB AP GROSS DESCRIPTION (Report)     A  The specimen is received in formalin, labeled with the patient's name   and hospital number, and is designated distal esophagus  The specimen   consists of multiple tan soft tissue fragments measuring in aggregate 0 5   x 0 4 x 0 2 cm  Entirely submitted  One cassette  B  The specimen is received in formalin, labeled with the patient's name   and hospital number, and is designated sigmoid polyp biopsy  The   specimen consists of 2 tan soft tissue fragments measuring 0 4 and 0 6   centimeters in greatest dimension  Entirely submitted  One cassette  C  The specimen is received in formalin, labeled with the patient's name   and hospital number, and is designated rectosigmoid polyp biopsy  The   specimen consists of a single tan soft tissue fragment measuring 0 4   centimeters in greatest dimension  Entirely submitted  One cassette  Note: The estimated total formalin fixation time based upon information   provided by the submitting clinician and the standard processing schedule   is over 72 hours  BMV

## 2018-01-17 NOTE — MISCELLANEOUS
Message  I spoke to patient re labs all nl I encouraged him to f/u with [facility]      Signatures   Electronically signed by : DAVID Palmer ; Sep 28 2016 10:02AM EST                       (Author)

## 2018-01-18 NOTE — MISCELLANEOUS
Message  GI Reminder Recall Nicol Conteh:   Date: 11/11/2016   Dear Jazmine Barnes:     Review of our records shows you are due for the following: EGD  Please call the following office to schedule your appointment:   [address] [facility], [address], [place], [address] [place] [address]  [phone]  We look forward to hearing from you! Sincerely, [facility]         Signatures   Electronically signed by :  Estrella Lam, ; Nov 11 2016 11:24AM EST                       (Author)

## 2018-01-22 VITALS
DIASTOLIC BLOOD PRESSURE: 72 MMHG | TEMPERATURE: 96 F | BODY MASS INDEX: 21.88 KG/M2 | RESPIRATION RATE: 20 BRPM | OXYGEN SATURATION: 98 % | SYSTOLIC BLOOD PRESSURE: 148 MMHG | HEIGHT: 75 IN | HEART RATE: 91 BPM | WEIGHT: 176 LBS

## 2018-01-22 VITALS
TEMPERATURE: 97.2 F | HEIGHT: 75 IN | SYSTOLIC BLOOD PRESSURE: 140 MMHG | BODY MASS INDEX: 21.64 KG/M2 | OXYGEN SATURATION: 98 % | DIASTOLIC BLOOD PRESSURE: 70 MMHG | RESPIRATION RATE: 20 BRPM | WEIGHT: 174 LBS | HEART RATE: 88 BPM

## 2018-01-23 NOTE — MISCELLANEOUS
History of Present Illness  COPD Hospital Discharge Initial Follow-Up Call: The patient is being contacted for follow-up after hospitalization  The date of discharge is 1/3/2018  I spoke with Deion Newton   Patient was identified as medium risk for readmission per risk stratification  The patient was discharged to home  The FEV1 is 59 %  The patient is a former smoker with a 60 pack year history  The patient quit smoking in Aug, 2017  The patient is experiencing the following symptoms: cough, dyspnea and that is yellow, but no wheezing and no fever  with oxygen at 3 LPM  Oxygen is used all the time  Zone tool status is yellow  Follow-up appointment(s) were made with the PCP on 1/11/2018  and the Pulmonary Provider on [address] in [place]-- no appt in the near future due to distance  The following topics were reviewed:  rescue vs  maintenance inhalers, DAVID PAPPAS , energy conservation and physician follow-ups  The patient felt ready to be discharged from the hospital  The patient was not given written &/or veral educational materials specific to his COPD diagnosis  The patient was given the opportunity to walk in the hallways with staff assistance to assess his breathing status and review breathing techniques to help prevent increasing shortness of breath  The staff offered to assist the patient in making follow-up appointments with his family doctor or other specialists  Narrative Summary:    Pt reports he is still perceivng SOB, wearing O2 @ 3L since discharge  He was not referred to [facility] Ambrosio, which we spoke about at length  Since he lives alone and is new to O2 therapy and continuing to recover from an exacerbation due to RSV, I did recommend  He will contemplate and i will check in with him in the near future  He was given our number to call in the interim if he had any further questions   Has supportive daughter, however she works a great deal  Has follow[facility] with [facility] scheduled for one week from today  COPD booklet sent in the mail today  Current Meds    1  Zolpidem Tartrate 10 MG Oral Tablet; TAKE 1 TABLET AT BEDTIME; Therapy: 79WDN5514 to (Evaluate:08Jan2018); Last Rx:09Nov2017 Ordered    2  Tamsulosin HCl - 0 4 MG Oral Capsule; TAKE ONE CAPSULE BY MOUTH ONE TIME   DAILY; Therapy: 71EIR7981 to (Evaluate:18Jun2018)  Requested for: 15Enb8411; Last   Rx:20Ktp2392 Ordered    3  Fluticasone Propionate 50 MCG/ACT Nasal Suspension; Two sprays in each nostril   once daily; Therapy: 19EHR6023 to (Chula Duffy)  Requested for: 78GLD6687; Last   Rx:22Nov2017; Status: [facility]  to Pharmacy - Awaiting Verification Ordered    4  ProAir  (90 Base) MCG/ACT Inhalation Aerosol Solution; Therapy: (Favio Sherwood) to Recorded   5  Spiriva HandiHaler 18 MCG Inhalation Capsule; Therapy: (Favio Sherwood) to Recorded    6  Albuterol Sulfate (2 5 MG/3ML) 0 083% Inhalation Nebulization Solution; USE 1 UNIT   DOSE IN NEBULIZER EVERY 4 HOURS AS NEEDED; Therapy: 33Uee8418 to (Last GZ:46UXE2137)  Requested for: 20GHO2609; Status: ACTIVE   - Transmit to [facility] Verification Ordered    7  Acyclovir 400 MG Oral Tablet; Take 1 three times daily for 5 days for treatment of recurrent   infection; Therapy: 96VOB9521 to (Last Rx:22Nov2017)  Requested for: 22Nov2017 Ordered    8  Meclizine HCl - 12 5 MG Oral Tablet; TAKE 1 TABLET DAILY; Therapy: 99Qcx9362 to (Evaluate:87Ldb0774)  Requested for: 68YAB5511; Last   Rx:29Jun2017 Ordered    9  Furosemide 20 MG Oral Tablet (Lasix); take 1 tablet every day; Therapy: 11ZLK8868 to (Last Gilbert Horner)  Requested for: 83VWJ7437 Ordered    10  AmLODIPine Besylate 5 MG Oral Tablet; take one tablet by mouth one time daily; Therapy: 81XWR4906 to (Billthea Kraig)  Requested for: 49CBX7151; Last    Rx:11Nov2016 Ordered    11  Omeprazole 40 MG Oral Capsule Delayed Release; TAKE ONE CAPSULE BY MOUTH    TWICE DAILY;     Therapy: 85DNO0508 to (Evaluate:40Nif9857)  Requested for: 74Yfs9482; Last    Rx:16Yeq2353 Ordered    12  Advair Diskus 250-50 MCG/DOSE Inhalation Aerosol Powder Breath Activated Recorded   13  Alpha1-Proteinase Inhibitor 1000 MG/50ML SOLN;    Therapy: (Recorded:23Jan2014) to Recorded   14  Zemaira 1000 MG Intravenous Solution Reconstituted; Therapy: (Recorded:09Apr2015) to Recorded    Allergies    1  Chantix TABS    2  No Known Latex Allergies    Future Appointments    Date/Time Provider Specialty Site   01/11/2018 08:30 AM DAVID Gottlieb  [address] [place] [address]   02/13/2018 08:30 AM DAVID Gottlieb   Family [facility]     Signatures   Electronically signed by : Rosy Mary, ; Jan 4 2018  1:42PM EST                       (Author)    Electronically signed by : Rosy Mary, ; Jan 4 2018  1:43PM EST                       (Author)

## 2018-01-23 NOTE — MISCELLANEOUS
History of Present Illness  HPI: Mr Myra Salmon has decided to accept VNA services  I have asked the \[facility]\"" IP  to place referral to  VNA, which is his choice for Kieran Ambrosio at this time  Current Meds    1  Zolpidem Tartrate 10 MG Oral Tablet; TAKE 1 TABLET AT BEDTIME; Therapy: 99VMM2603 to (Evaluate:08Jan2018); Last Rx:09Nov2017 Ordered    2  Tamsulosin HCl - 0 4 MG Oral Capsule; TAKE ONE CAPSULE BY MOUTH ONE TIME   DAILY; Therapy: 16AWI7312 to (Evaluate:18Jun2018)  Requested for: 94Fss8363; Last   Rx:49Apb1497 Ordered    3  Fluticasone Propionate 50 MCG/ACT Nasal Suspension; Two sprays in each nostril   once daily; Therapy: 62KOQ8912 to (Danielle Galvez)  Requested for: 05TLT7377; Last   Rx:22Nov2017; Status: [facility]  to Pharmacy - Awaiting Verification Ordered    4  ProAir  (90 Base) MCG/ACT Inhalation Aerosol Solution; Therapy: [phone]) to Recorded   5  Spiriva HandiHaler 18 MCG Inhalation Capsule; Therapy: (02 [phone]) to Recorded    6  Albuterol Sulfate (2 5 MG/3ML) 0 083% Inhalation Nebulization Solution; USE 1 UNIT   DOSE IN NEBULIZER EVERY 4 HOURS AS NEEDED; Therapy: 25Apr2017 to (Last SM:85WIZ2016)  Requested for: 81WBE0273; Status: ACTIVE   - Transmit to [facility] Verification Ordered    7  Acyclovir 400 MG Oral Tablet; Take 1 three times daily for 5 days for treatment of recurrent   infection; Therapy: 91NMG7940 to (Last Rx:22Nov2017)  Requested for: 22Nov2017 Ordered    8  Meclizine HCl - 12 5 MG Oral Tablet; TAKE 1 TABLET DAILY; Therapy: 40Icq3039 to (Evaluate:59Lrs5100)  Requested for: 16MGX4690; Last   Rx:29Jun2017 Ordered    9  AmLODIPine Besylate 5 MG Oral Tablet; take one tablet by mouth one time daily; Therapy: 71UFB9794 to (Camilo Lindsay)  Requested for: 19HBT7035; Last   Rx:11Nov2016 Ordered    10  Omeprazole 40 MG Oral Capsule Delayed Release; TAKE ONE CAPSULE BY MOUTH    TWICE DAILY;     Therapy: 52TVO7344 to (Evaluate:11Pfh0993)  Requested for: 94Cpn0857; Last    Rx:27Cht3144 Ordered    11  Advair Diskus 250-50 MCG/DOSE Inhalation Aerosol Powder Breath Activated Recorded   12  Alpha1-Proteinase Inhibitor 1000 MG/50ML SOLN;    Therapy: (Recorded:23Jan2014) to Recorded   13  Zemaira 1000 MG Intravenous Solution Reconstituted; Therapy: (Recorded:05Jan2018) to Recorded    Allergies    1  Chantix TABS    2  No Known Latex Allergies    Future Appointments    Date/Time Provider Specialty Site   01/11/2018 08:30 AM DAVID Powell  [address] [place] [address]   02/13/2018 08:30 AM DAVID Powell   Family [facility]     Signatures   Electronically signed by : Iraj Cohen, ; Jan 5 2018  2:13PM EST                       (Author)

## 2018-01-24 ENCOUNTER — HOSPITAL ENCOUNTER (OUTPATIENT)
Dept: RADIOLOGY | Facility: HOSPITAL | Age: 73
Discharge: HOME | End: 2018-01-24
Payer: COMMERCIAL

## 2018-01-24 VITALS
DIASTOLIC BLOOD PRESSURE: 62 MMHG | SYSTOLIC BLOOD PRESSURE: 130 MMHG | WEIGHT: 173 LBS | HEART RATE: 96 BPM | OXYGEN SATURATION: 97 % | HEIGHT: 75 IN | BODY MASS INDEX: 21.51 KG/M2 | RESPIRATION RATE: 18 BRPM

## 2018-01-24 DIAGNOSIS — Z12.2 SCREENING FOR MALIGNANT NEOPLASM OF RESPIRATORY ORGAN: ICD-10-CM

## 2018-01-31 ENCOUNTER — OFFICE VISIT (OUTPATIENT)
Dept: PULMONOLOGY | Facility: MEDICAL CENTER | Age: 73
End: 2018-01-31
Payer: MEDICARE

## 2018-01-31 ENCOUNTER — TELEPHONE (OUTPATIENT)
Dept: RESPIRATORY THERAPY | Facility: HOSPITAL | Age: 73
End: 2018-01-31

## 2018-01-31 VITALS
HEIGHT: 76 IN | BODY MASS INDEX: 21.31 KG/M2 | HEART RATE: 88 BPM | DIASTOLIC BLOOD PRESSURE: 86 MMHG | TEMPERATURE: 97.7 F | RESPIRATION RATE: 12 BRPM | OXYGEN SATURATION: 99 % | WEIGHT: 175 LBS | SYSTOLIC BLOOD PRESSURE: 158 MMHG

## 2018-01-31 DIAGNOSIS — R91.8 LUNG NODULES: Primary | ICD-10-CM

## 2018-01-31 DIAGNOSIS — J44.9 CHRONIC OBSTRUCTIVE PULMONARY DISEASE, UNSPECIFIED COPD TYPE (HCC): ICD-10-CM

## 2018-01-31 DIAGNOSIS — J96.01 ACUTE HYPOXEMIC RESPIRATORY FAILURE (HCC): ICD-10-CM

## 2018-01-31 DIAGNOSIS — J44.9 MODERATE COPD (CHRONIC OBSTRUCTIVE PULMONARY DISEASE) (HCC): ICD-10-CM

## 2018-01-31 DIAGNOSIS — E88.01 AAT (ALPHA-1-ANTITRYPSIN) DEFICIENCY (HCC): ICD-10-CM

## 2018-01-31 DIAGNOSIS — R06.02 SOB (SHORTNESS OF BREATH): ICD-10-CM

## 2018-01-31 PROBLEM — B33.8 RSV INFECTION: Status: RESOLVED | Noted: 2017-12-31 | Resolved: 2018-01-31

## 2018-01-31 PROBLEM — K21.9 GASTROESOPHAGEAL REFLUX DISEASE: Status: RESOLVED | Noted: 2017-12-30 | Resolved: 2018-01-31

## 2018-01-31 PROBLEM — J44.1 COPD EXACERBATION (HCC): Status: RESOLVED | Noted: 2017-12-30 | Resolved: 2018-01-31

## 2018-01-31 PROBLEM — Z87.891 FORMER SMOKER: Status: ACTIVE | Noted: 2018-01-31

## 2018-01-31 PROCEDURE — 99215 OFFICE O/P EST HI 40 MIN: CPT | Performed by: INTERNAL MEDICINE

## 2018-01-31 PROCEDURE — 94618 PULMONARY STRESS TESTING: CPT | Performed by: INTERNAL MEDICINE

## 2018-01-31 PROCEDURE — 94010 BREATHING CAPACITY TEST: CPT | Performed by: INTERNAL MEDICINE

## 2018-01-31 RX ORDER — ZOLPIDEM TARTRATE 10 MG/1
1 TABLET ORAL
COMMUNITY
Start: 2017-11-09 | End: 2018-02-14

## 2018-01-31 RX ORDER — ALBUTEROL SULFATE 2.5 MG/3ML
1 SOLUTION RESPIRATORY (INHALATION) EVERY 4 HOURS PRN
COMMUNITY
Start: 2017-04-25 | End: 2019-12-09 | Stop reason: HOSPADM

## 2018-01-31 RX ORDER — FLUTICASONE PROPIONATE 50 MCG
SPRAY, SUSPENSION (ML) NASAL
COMMUNITY
Start: 2017-11-22 | End: 2018-07-30 | Stop reason: SDUPTHER

## 2018-01-31 RX ORDER — TAMSULOSIN HYDROCHLORIDE 0.4 MG/1
1 CAPSULE ORAL DAILY
COMMUNITY
Start: 2015-07-07 | End: 2018-01-31

## 2018-01-31 RX ORDER — ALPHA-1-PROTEINASE INHIBITOR HUMAN 1000 MG
KIT INTRAVENOUS
COMMUNITY
Start: 2017-12-27 | End: 2018-03-29 | Stop reason: SDUPTHER

## 2018-01-31 RX ORDER — AMLODIPINE BESYLATE 5 MG/1
1 TABLET ORAL DAILY
COMMUNITY
Start: 2016-11-11 | End: 2018-01-31

## 2018-01-31 RX ORDER — MECLIZINE HCL 12.5 MG/1
1 TABLET ORAL AS NEEDED
COMMUNITY
Start: 2017-04-25 | End: 2019-05-21 | Stop reason: ALTCHOICE

## 2018-01-31 RX ORDER — ALBUTEROL SULFATE 90 UG/1
2 AEROSOL, METERED RESPIRATORY (INHALATION) EVERY 6 HOURS PRN
Qty: 1 INHALER | Refills: 0 | Status: SHIPPED | OUTPATIENT
Start: 2018-01-31 | End: 2018-10-23 | Stop reason: SDUPTHER

## 2018-01-31 RX ORDER — OMEPRAZOLE 40 MG/1
CAPSULE, DELAYED RELEASE ORAL
COMMUNITY
Start: 2017-12-21 | End: 2018-09-06 | Stop reason: SDUPTHER

## 2018-01-31 NOTE — LETTER
January 31, 2018     [facility], [place], [address] [address]    Patient: Kim Del Angel   YOB: 1945   Date of Visit: 1/31/2018       Dear Dr Cassy Jason: Thank you for referring Navikatelyn Rosemary to me for evaluation  Below are my notes for this consultation  If you have questions, please do not hesitate to call me  I look forward to following your patient along with you  Sincerely,        Phoenix Dubon MD        CC: No Recipients  Phoenix Dubon MD  1/31/2018  7:22 PM  Sign at close encounter  Assessment/Plan:     Problem List Items Addressed This Visit        Digestive    AAT (alpha-1-antitrypsin) deficiency ([address] )     Continue with infusions weekly  Relevant Medications    ZEMAIRA injection    fluticasone (FLONASE) 50 mcg/act nasal spray    fluticasone-salmeterol (ADVAIR DISKUS) 250-50 mcg/dose inhaler    albuterol (2 5 mg/3 mL) 0 083 % nebulizer solution    albuterol (PROVENTIL HFA,VENTOLIN HFA) 90 mcg/act inhaler    Umeclidinium Bromide (INCRUSE ELLIPTA) 62 5 MCG/INH AEPB       Respiratory    Moderate COPD (chronic obstructive pulmonary disease) (HCC)     Lung function is stable but he is deconditioned secondary to his recent hospitalization  Therefore recommend pulmonary rehab  Also to continue with Advair and now will switch to Incruse due to insurance coverage  Use Proventil/nebulizer as needed          Relevant Medications    ZEMAIRA injection    fluticasone (FLONASE) 50 mcg/act nasal spray    fluticasone-salmeterol (ADVAIR DISKUS) 250-50 mcg/dose inhaler    albuterol (2 5 mg/3 mL) 0 083 % nebulizer solution    albuterol (PROVENTIL HFA,VENTOLIN HFA) 90 mcg/act inhaler    Umeclidinium Bromide (INCRUSE ELLIPTA) 62 5 MCG/INH AEPB    Acute hypoxemic respiratory failure (HCC)     Wean O2 as tolerated  I did discuss with the patient that if his sats are >90% he may cut down on the liter flow     Also order is placed for portable oxygen as he is unable to carry the large tanks and cause more harm to his current condition         Relevant Orders    Home Oxygen Portability Evaluation Only       Other    SOB (shortness of breath)     Likely in part due to deconditioning and therefore recommend pulmonary rehab at this time         Lung nodules - Primary     Although these may be inflammatory due to recent respiratory infection/RSV he is at higher risk for malignancy and given size will obtain PET/CT to assess  Further recommendations based on PET/CT         Relevant Orders    NM PET CT skull base to mid thigh            Return in about 4 weeks (around 2/28/2018)  All questions are answered to the patient's satisfaction and understanding  He verbalizes understanding  He is encouraged to call with any further questions or concerns  Portions of the record may have been created with voice recognition software  Occasional wrong word or "sound a like" substitutions may have occurred due to the inherent limitations of voice recognition software  Read the chart carefully and recognize, using context, where substitutions have occurred  ______________________________________________________________________    Chief Complaint:   Chief Complaint   Patient presents with    Follow-up     hosptilized for rsv    Abnormal Imaging Result     chest ct       Patient ID: Geri Mi is a 67 y o  y o  male has a past medical history of Anesthesia complication; Arthritis; BPH (benign prostatic hyperplasia); Cancer [facility]); COPD (chronic obstructive pulmonary disease) ([facility] [address] ); Full dentures; Hypertension; Kidney stone; Liver disease; Pulmonary emphysema ([address] ); and Wears glasses  1/31/2018  He is finishing up his steroids  Patient is here post hospitalization  He is here with his daughter today  He has not been doing so well post hospitalization  He continues with shortness of breath on minimal exertion    Prior to his hospitalization his breathing was worsening but he was able to go out of the home and do some housework  COPD- never hospitalized before this episode  Requires steroids about 1-2 times a year  Worse over the last year  Quit smoking in August 2017  Patient was also not on oxygen before hospitalization during the day or at night  Currently he is on 3L via nasal cannula  His sats have been 94% and above  No cough  He continues his weekly AAT infusions  GERD is controlled on PPI  Occasional postnasal drip    Shortness of Breath   This is a new problem  The current episode started more than 1 month ago  The problem occurs constantly  The problem has been unchanged  Associated symptoms include chest pain and swollen glands  Pertinent negatives include no abdominal pain, coryza, fever, headaches, hemoptysis, leg pain, leg swelling, orthopnea, PND, rash, sore throat, sputum production, syncope, vomiting or wheezing  The symptoms are aggravated by exercise and any activity  Associated symptoms comments: Dry mouth  He has tried beta agonist inhalers, steroid inhalers and ipratropium inhalers for the symptoms  The treatment provided no relief  His past medical history is significant for COPD and pneumonia  Review of Systems   Constitutional: Positive for fatigue  Negative for activity change, appetite change, chills and fever  HENT: Negative for congestion, dental problem, postnasal drip, sinus pain, sneezing, sore throat and voice change  Eyes: Negative for visual disturbance  Respiratory: Positive for shortness of breath  Negative for hemoptysis, sputum production and wheezing  See HPI   Cardiovascular: Positive for chest pain  Negative for palpitations, orthopnea, leg swelling, syncope and PND  Gastrointestinal: Negative for abdominal pain, diarrhea, nausea and vomiting  Genitourinary: Negative for difficulty urinating and dysuria  Musculoskeletal: Negative for arthralgias and back pain  Skin: Negative for rash and wound     Allergic/Immunologic: Negative for environmental allergies and food allergies  Neurological: Negative for dizziness, numbness and headaches  Hematological: Negative for adenopathy  Psychiatric/Behavioral: Negative for agitation, behavioral problems and confusion  Smoking history: He reports that he has quit smoking  He has a 60 00 pack-year smoking history   He has never used smokeless tobacco     The following portions of the patient's history were reviewed and updated as appropriate: allergies, current medications, past family history, past medical history, past social history, past surgical history and problem list     Immunization History   Administered Date(s) Administered    Influenza TIV (IM) 09/27/2013, 10/23/2014    Pneumococcal Conjugate 13-Valent 04/26/2016    Pneumococcal Polysaccharide PPV23 04/23/2011    Tdap 04/26/2016    Zoster 10/29/2014, 04/27/2015     Current Outpatient Prescriptions   Medication Sig Dispense Refill    albuterol (2 5 mg/3 mL) 0 083 % nebulizer solution Inhale 1 each every 4 (four) hours as needed      albuterol (PROVENTIL HFA,VENTOLIN HFA) 90 mcg/act inhaler Inhale 2 puffs every 6 (six) hours as needed for wheezing      Alpha1-Proteinase Inhibitor (ZEMAIRA IV) Infuse into a venous catheter once a week On tuesday -1000mg /50 ml       amLODIPine (NORVASC) 5 mg tablet Take 5 mg by mouth every morning      azelastine (ASTELIN) 0 1 % nasal spray 1 spray into each nostril 2 (two) times a day Use in each nostril as directed      fluticasone (FLONASE) 50 mcg/act nasal spray       fluticasone-salmeterol (ADVAIR DISKUS) 250-50 mcg/dose inhaler Inhale      meclizine (ANTIVERT) 12 5 MG tablet Take 1 tablet by mouth daily      omeprazole (PriLOSEC) 40 MG capsule       Omeprazole Magnesium (PRILOSEC OTC PO) Take 20 mg by mouth every morning      predniSONE 20 mg tablet Take 1 tablet by mouth daily 20 tablet 0    tamsulosin (FLOMAX) 0 4 mg Take 0 4 mg by mouth daily at bedtime      tiotropium (SPIRIVA HANDIHALER) 18 mcg inhalation capsule Place 18 mcg into inhaler and inhale every morning      ZEMAIRA injection       zolpidem (AMBIEN) 10 mg tablet Take 1 tablet by mouth       No current facility-administered medications for this visit  Allergies: Chantix [varenicline]    Objective:  Vitals:    01/31/18 0938 01/31/18 0941   BP: 158/86    BP Location: Left arm    Patient Position: Sitting    Cuff Size: Large    Pulse: 88    Resp: 12    Temp: 97 7 °F (36 5 °C)    TempSrc: Oral    SpO2:  99%   Weight: 79 4 kg (175 lb)    Height: 6' 3 5" (1 918 m)    Oxygen Therapy  SpO2: 99 %  Oxygen Therapy: Supplemental oxygen  O2 Delivery Method: Nasal cannula  O2 Flow Rate (L/min): 3 L/min    Wt Readings from Last 3 Encounters:   01/31/18 79 4 kg (175 lb)   01/11/18 78 5 kg (173 lb)   12/29/17 80 2 kg (176 lb 14 4 oz)     Body mass index is 21 58 kg/m²  Physical Exam   Constitutional: He is oriented to person, place, and time  He appears well-developed and well-nourished  No distress  HENT:   Head: Normocephalic and atraumatic  Mouth/Throat: Oropharynx is clear and moist  No oropharyngeal exudate (very dry mouth)  Eyes: EOM are normal  Pupils are equal, round, and reactive to light  Neck: Normal range of motion  Neck supple  No tracheal deviation present  No thyromegaly present  Cardiovascular: Normal rate and regular rhythm  No murmur heard  Pulmonary/Chest: Effort normal and breath sounds normal  No respiratory distress  He has no wheezes  He has no rales  He exhibits no tenderness  Abdominal: Soft  Bowel sounds are normal  He exhibits no distension  There is no tenderness  Musculoskeletal: Normal range of motion  He exhibits no edema  Lymphadenopathy:     He has cervical adenopathy (left>right)  Neurological: He is alert and oriented to person, place, and time  No cranial nerve deficit  Skin: Skin is warm and dry  He is not diaphoretic     Psychiatric: He has a normal mood and affect  His behavior is normal    Vitals reviewed  Lab Review:   Lab Results   Component Value Date     01/03/2018     09/27/2016    K 4 5 01/03/2018    K 4 2 09/27/2016     01/03/2018     09/27/2016    CO2 28 01/03/2018    CO2 23 09/27/2016    BUN 19 01/03/2018    BUN 14 09/27/2016    CREATININE 1 03 01/03/2018    CREATININE 1 09 09/27/2016    GLUCOSE 123 01/03/2018    GLUCOSE 89 09/27/2016    CALCIUM 8 7 01/03/2018    CALCIUM 9 3 09/27/2016     Lab Results   Component Value Date    WBC 8 40 01/03/2018    HGB 13 5 (L) 01/03/2018    HGB 14 7 09/27/2016    HCT 39 3 (L) 01/03/2018    MCV 96 01/03/2018     01/03/2018       Diagnostics:  I have personally reviewed pertinent reports     and I have personally reviewed pertinent films in PACS    CT chest obtained 1/24 showed 2 separate nodules that measured 1 1 cm RUL, 1 4 cm LLL  No mediastinal adenopathy    Office Spirometry Results: 1/31/18  FEV1: 2 14 liters (55%)  FVC: 4 94 liters (94%)  FEV1/FVC: 43 3 %

## 2018-01-31 NOTE — PROGRESS NOTES
Assessment/Plan:     Problem List Items Addressed This Visit        Digestive    AAT (alpha-1-antitrypsin) deficiency ([address] )     Continue with infusions weekly  Relevant Medications    ZEMAIRA injection    fluticasone (FLONASE) 50 mcg/act nasal spray    fluticasone-salmeterol (ADVAIR DISKUS) 250-50 mcg/dose inhaler    albuterol (2 5 mg/3 mL) 0 083 % nebulizer solution    albuterol (PROVENTIL HFA,VENTOLIN HFA) 90 mcg/act inhaler    Umeclidinium Bromide (INCRUSE ELLIPTA) 62 5 MCG/INH AEPB       Respiratory    Moderate COPD (chronic obstructive pulmonary disease) (HCC)     Lung function is stable but he is deconditioned secondary to his recent hospitalization  Therefore recommend pulmonary rehab  Also to continue with Advair and now will switch to Incruse due to insurance coverage  Use Proventil/nebulizer as needed          Relevant Medications    ZEMAIRA injection    fluticasone (FLONASE) 50 mcg/act nasal spray    fluticasone-salmeterol (ADVAIR DISKUS) 250-50 mcg/dose inhaler    albuterol (2 5 mg/3 mL) 0 083 % nebulizer solution    albuterol (PROVENTIL HFA,VENTOLIN HFA) 90 mcg/act inhaler    Umeclidinium Bromide (INCRUSE ELLIPTA) 62 5 MCG/INH AEPB    Acute hypoxemic respiratory failure (HCC)     Wean O2 as tolerated  I did discuss with the patient that if his sats are >90% he may cut down on the liter flow  Also order is placed for portable oxygen as he is unable to carry the large tanks and cause more harm to his current condition         Relevant Orders    Home Oxygen Portability Evaluation Only       Other    SOB (shortness of breath)     Likely in part due to deconditioning and therefore recommend pulmonary rehab at this time         Lung nodules - Primary     Although these may be inflammatory due to recent respiratory infection/RSV he is at higher risk for malignancy and given size will obtain PET/CT to assess     Further recommendations based on PET/CT         Relevant Orders    NM PET CT skull base to mid thigh            Return in about 4 weeks (around 2/28/2018)  All questions are answered to the patient's satisfaction and understanding  He verbalizes understanding  He is encouraged to call with any further questions or concerns  Portions of the record may have been created with voice recognition software  Occasional wrong word or "sound a like" substitutions may have occurred due to the inherent limitations of voice recognition software  Read the chart carefully and recognize, using context, where substitutions have occurred  ______________________________________________________________________    Chief Complaint:   Chief Complaint   Patient presents with    Follow-up     hosptilized for rsv    Abnormal Imaging Result     chest ct       Patient ID: [id number] Sascha Balnk is a 67 y o  y o  male has a past medical history of Anesthesia complication; Arthritis; BPH (benign prostatic hyperplasia); Cancer [facility]); COPD (chronic obstructive pulmonary disease) ([facility] [address] ); Full dentures; Hypertension; Kidney stone; Liver disease; Pulmonary emphysema ([facility] [address] ); and Wears glasses  1/31/2018  He is finishing up his steroids  Patient is here post hospitalization  He is here with his daughter today  He has not been doing so well post hospitalization  He continues with shortness of breath on minimal exertion  Prior to his hospitalization his breathing was worsening but he was able to go out of the home and do some housework  COPD- never hospitalized before this episode  Requires steroids about 1-2 times a year  Worse over the last year  Quit smoking in August 2017  Patient was also not on oxygen before hospitalization during the day or at night  Currently he is on 3L via nasal cannula  His sats have been 94% and above  No cough  He continues his weekly AAT infusions  GERD is controlled on PPI  Occasional postnasal drip    Shortness of Breath   This is a new problem   The current episode started more than 1 month ago  The problem occurs constantly  The problem has been unchanged  Associated symptoms include chest pain and swollen glands  Pertinent negatives include no abdominal pain, coryza, fever, headaches, hemoptysis, leg pain, leg swelling, orthopnea, PND, rash, sore throat, sputum production, syncope, vomiting or wheezing  The symptoms are aggravated by exercise and any activity  Associated symptoms comments: Dry mouth  He has tried beta agonist inhalers, steroid inhalers and ipratropium inhalers for the symptoms  The treatment provided no relief  His past medical history is significant for COPD and pneumonia  Review of Systems   Constitutional: Positive for fatigue  Negative for activity change, appetite change, chills and fever  HENT: Negative for congestion, dental problem, postnasal drip, sinus pain, sneezing, sore throat and voice change  Eyes: Negative for visual disturbance  Respiratory: Positive for shortness of breath  Negative for hemoptysis, sputum production and wheezing  See HPI   Cardiovascular: Positive for chest pain  Negative for palpitations, orthopnea, leg swelling, syncope and PND  Gastrointestinal: Negative for abdominal pain, diarrhea, nausea and vomiting  Genitourinary: Negative for difficulty urinating and dysuria  Musculoskeletal: Negative for arthralgias and back pain  Skin: Negative for rash and wound  Allergic/Immunologic: Negative for environmental allergies and food allergies  Neurological: Negative for dizziness, numbness and headaches  Hematological: Negative for adenopathy  Psychiatric/Behavioral: Negative for agitation, behavioral problems and confusion  Smoking history: He reports that he has quit smoking  He has a 60 00 pack-year smoking history   He has never used smokeless tobacco     The following portions of the patient's history were reviewed and updated as appropriate: allergies, current medications, past family history, past medical history, past social history, past surgical history and problem list     Immunization History   Administered Date(s) Administered    Influenza TIV (IM) 09/27/2013, 10/23/2014    Pneumococcal Conjugate 13-Valent 04/26/2016    Pneumococcal Polysaccharide PPV23 04/23/2011    Tdap 04/26/2016    Zoster 10/29/2014, 04/27/2015     Current Outpatient Prescriptions   Medication Sig Dispense Refill    albuterol (2 5 mg/3 mL) 0 083 % nebulizer solution Inhale 1 each every 4 (four) hours as needed      albuterol (PROVENTIL HFA,VENTOLIN HFA) 90 mcg/act inhaler Inhale 2 puffs every 6 (six) hours as needed for wheezing      Alpha1-Proteinase Inhibitor (ZEMAIRA IV) Infuse into a venous catheter once a week On tuesday -1000mg /50 ml       amLODIPine (NORVASC) 5 mg tablet Take 5 mg by mouth every morning      azelastine (ASTELIN) 0 1 % nasal spray 1 spray into each nostril 2 (two) times a day Use in each nostril as directed      fluticasone (FLONASE) 50 mcg/act nasal spray       fluticasone-salmeterol (ADVAIR DISKUS) 250-50 mcg/dose inhaler Inhale      meclizine (ANTIVERT) 12 5 MG tablet Take 1 tablet by mouth daily      omeprazole (PriLOSEC) 40 MG capsule       Omeprazole Magnesium (PRILOSEC OTC PO) Take 20 mg by mouth every morning      predniSONE 20 mg tablet Take 1 tablet by mouth daily 20 tablet 0    tamsulosin (FLOMAX) 0 4 mg Take 0 4 mg by mouth daily at bedtime      tiotropium (SPIRIVA HANDIHALER) 18 mcg inhalation capsule Place 18 mcg into inhaler and inhale every morning      ZEMAIRA injection       zolpidem (AMBIEN) 10 mg tablet Take 1 tablet by mouth       No current facility-administered medications for this visit        Allergies: Chantix [varenicline]    Objective:  Vitals:    01/31/18 0938 01/31/18 0941   BP: 158/86    BP Location: Left arm    Patient Position: Sitting    Cuff Size: Large    Pulse: 88    Resp: 12    Temp: 97 7 °F (36 5 °C)    TempSrc: Oral    SpO2:  99%   Weight: 79 4 kg (175 lb) Height: 6' 3 5" (1 918 m)    Oxygen Therapy  SpO2: 99 %  Oxygen Therapy: Supplemental oxygen  O2 Delivery Method: Nasal cannula  O2 Flow Rate (L/min): 3 L/min    Wt Readings from Last 3 Encounters:   01/31/18 79 4 kg (175 lb)   01/11/18 78 5 kg (173 lb)   12/29/17 80 2 kg (176 lb 14 4 oz)     Body mass index is 21 58 kg/m²  Physical Exam   Constitutional: He is oriented to person, place, and time  He appears well-developed and well-nourished  No distress  HENT:   Head: Normocephalic and atraumatic  Mouth/Throat: Oropharynx is clear and moist  No oropharyngeal exudate (very dry mouth)  Eyes: EOM are normal  Pupils are equal, round, and reactive to light  Neck: Normal range of motion  Neck supple  No tracheal deviation present  No thyromegaly present  Cardiovascular: Normal rate and regular rhythm  No murmur heard  Pulmonary/Chest: Effort normal and breath sounds normal  No respiratory distress  He has no wheezes  He has no rales  He exhibits no tenderness  Abdominal: Soft  Bowel sounds are normal  He exhibits no distension  There is no tenderness  Musculoskeletal: Normal range of motion  He exhibits no edema  Lymphadenopathy:     He has cervical adenopathy (left>right)  Neurological: He is alert and oriented to person, place, and time  No cranial nerve deficit  Skin: Skin is warm and dry  He is not diaphoretic  Psychiatric: He has a normal mood and affect  His behavior is normal    Vitals reviewed        Lab Review:   Lab Results   Component Value Date     01/03/2018     09/27/2016    K 4 5 01/03/2018    K 4 2 09/27/2016     01/03/2018     09/27/2016    CO2 28 01/03/2018    CO2 23 09/27/2016    BUN 19 01/03/2018    BUN 14 09/27/2016    CREATININE 1 03 01/03/2018    CREATININE 1 09 09/27/2016    GLUCOSE 123 01/03/2018    GLUCOSE 89 09/27/2016    CALCIUM 8 7 01/03/2018    CALCIUM 9 3 09/27/2016     Lab Results   Component Value Date    WBC 8 40 01/03/2018    HGB 13 5 (L) 01/03/2018    HGB 14 7 09/27/2016    HCT 39 3 (L) 01/03/2018    MCV 96 01/03/2018     01/03/2018       Diagnostics:  I have personally reviewed pertinent reports     and I have personally reviewed pertinent films in PACS    CT chest obtained 1/24 showed 2 separate nodules that measured 1 1 cm RUL, 1 4 cm LLL  No mediastinal adenopathy    Office Spirometry Results: 1/31/18  FEV1: 2 14 liters (55%)  FVC: 4 94 liters (94%)  FEV1/FVC: 43 3 %

## 2018-01-31 NOTE — PATIENT INSTRUCTIONS
You have lung nodules- these will be evaluated by PET scan  Please schedule and have this done  Please call pulmonary rehab and schedule to be set up for this  Stop Spiriva and start Incruse in place of that due to your insurance  Continue with Advair and rinse and spit after  Please call in a few days if you do not hear from oxygen company

## 2018-01-31 NOTE — TELEPHONE ENCOUNTER
Communication:  Date of Discharge: 1/30/18  Readmission Date:    Identified Risk for Readmission Per Risk Stratification: Medium Risk  Current Disposition: Home  Has patient seen PCP since discharge? Has patient seen Pulmonologist since discharge? Yes, 1/31/18    History   Smoking Status    Former Smoker    Packs/day: 1 00    Years: 60 00    Quit date: 8/31/2017   Smokeless Tobacco    Never Used     Comment: quit in august 2017        mMrc Scale Rating:  II -- Walks slower than people of the same age on level ground because of SOB and/or have to stop for breath    Counseling and Topics Reviewed:  Maintenance Inhaler/Frequency: Incruse, Rescue Inhaler/Frequency: Albuterol, Infection prevention, Pulmonary Rehab, Medication adherence and Physician follow-up, COPD care number given      Narrative Summary: (ie: respiratory status, sputum production, persistent wheezing or coughing)  Patient saw Pulmonologist, Dr Rj Warren  Today  He was given instructions to have a PET scan done, and a script for Pulmonary Rehab  He still feels tired  Dr Rj Warren sent evaluation for Conserving Device script to DME Provider  Patient will be taking Incruse Ellipta in place of Spiriva, due to insurance coverage  O2 in use at 3 lpm ATC  Patient is pleasant and compliant with medication, MD appts

## 2018-02-01 DIAGNOSIS — I10 ESSENTIAL HYPERTENSION: Primary | ICD-10-CM

## 2018-02-01 NOTE — ASSESSMENT & PLAN NOTE
Wean O2 as tolerated  I did discuss with the patient that if his sats are >90% he may cut down on the liter flow     Also order is placed for portable oxygen as he is unable to carry the large tanks and cause more harm to his current condition

## 2018-02-01 NOTE — ASSESSMENT & PLAN NOTE
Although these may be inflammatory due to recent respiratory infection/RSV he is at higher risk for malignancy and given size will obtain PET/CT to assess     Further recommendations based on PET/CT

## 2018-02-01 NOTE — ASSESSMENT & PLAN NOTE
Lung function is stable but he is deconditioned secondary to his recent hospitalization  Therefore recommend pulmonary rehab  Also to continue with Advair and now will switch to Incruse due to insurance coverage    Use Proventil/nebulizer as needed

## 2018-02-02 RX ORDER — AMLODIPINE BESYLATE 5 MG/1
5 TABLET ORAL EVERY MORNING
Qty: 30 TABLET | Refills: 5 | Status: SHIPPED | OUTPATIENT
Start: 2018-02-02 | End: 2018-03-23

## 2018-02-05 ENCOUNTER — TELEPHONE (OUTPATIENT)
Dept: PULMONOLOGY | Facility: MEDICAL CENTER | Age: 73
End: 2018-02-05

## 2018-02-06 ENCOUNTER — CLINICAL SUPPORT (OUTPATIENT)
Dept: CARDIAC REHAB | Facility: CLINIC | Age: 73
End: 2018-02-06
Payer: MEDICARE

## 2018-02-06 VITALS — BODY MASS INDEX: 21.43 KG/M2 | WEIGHT: 176 LBS | HEIGHT: 76 IN

## 2018-02-06 DIAGNOSIS — J44.9 CHRONIC OBSTRUCTIVE PULMONARY DISEASE, UNSPECIFIED COPD TYPE (HCC): Primary | ICD-10-CM

## 2018-02-06 NOTE — PROGRESS NOTES
CARDIAC/PULMONARY REHAB ASSESSMENT    Today's date: 2018   Patient name: John Bertrand      : 1945       MRN: [id number]  PCP: Jak Maza MD  Pulmonologist: Evan Diggs    Dx:   Encounter Diagnosis   Name Primary?  Chronic obstructive pulmonary disease, unspecified COPD type ([facility] ) Yes     Date of onset: 2014  Cultural needs: none    Height: Height: 6' 3 5" (191 8 cm)   Weight:  Weight - Scale: 79 8 kg (176 lb)   Medical History:   Past Medical History:   Diagnosis Date    Anesthesia complication     Difficult to wake up    Arthritis     BPH (benign prostatic hyperplasia)     urinary frequency    Cancer (HCC)     basal cell neck, face    COPD (chronic obstructive pulmonary disease) (HCC)     Full dentures     Hypertension     controlled    Kidney stone     at least 7 episodes    Liver disease     Alpha 1- enzyme deficiency - diagnosed   has been on weekly replacement therapy since then    Pulmonary emphysema ([facility] )     1/25/15  FEV1 - 2 45 liters or 59% of predicted    Wears glasses     for driving only       Physical Limitations: Dyspnea with minimal exertion    Risk Factors   Cholesterol: none  Smoking: former 60 pack years quit 2017  HTN: yes  DM: none  Obesity: no   Inactivity: yes  Family History:   Family History   Problem Relation Age of Onset    Emphysema Mother      never smoked    Emphysema Father     Cancer Brother      colon     Allergies:    Allergies   Allergen Reactions    Chantix [Varenicline]      Caused prostate infection     Other: none    Current Medications:   Current Outpatient Prescriptions   Medication Sig Dispense Refill    albuterol (2 5 mg/3 mL) 0 083 % nebulizer solution Inhale 1 each every 4 (four) hours as needed      albuterol (PROVENTIL HFA,VENTOLIN HFA) 90 mcg/act inhaler Inhale 2 puffs every 6 (six) hours as needed for wheezing 1 Inhaler 0    Alpha1-Proteinase Inhibitor (ZEMAIRA IV) Infuse into a venous catheter once a week On tuesday - /50 ml       amLODIPine (NORVASC) 5 mg tablet Take 1 tablet (5 mg total) by mouth every morning 30 tablet 5    azelastine (ASTELIN) 0 1 % nasal spray 1 spray into each nostril 2 (two) times a day Use in each nostril as directed      fluticasone (FLONASE) 50 mcg/act nasal spray       fluticasone-salmeterol (ADVAIR DISKUS) 250-50 mcg/dose inhaler Inhale      meclizine (ANTIVERT) 12 5 MG tablet Take 1 tablet by mouth daily      omeprazole (PriLOSEC) 40 MG capsule       Omeprazole Magnesium (PRILOSEC OTC PO) Take 20 mg by mouth every morning      predniSONE 20 mg tablet Take 1 tablet by mouth daily 20 tablet 0    tamsulosin (FLOMAX) 0 4 mg Take 0 4 mg by mouth daily at bedtime      Umeclidinium Bromide (INCRUSE ELLIPTA) 62 5 MCG/INH AEPB Inhale 1 Act daily 1 each 0    ZEMAIRA injection       zolpidem (AMBIEN) 10 mg tablet Take 1 tablet by mouth       No current facility-administered medications for this visit  Functional Status Prior to Diagnosis for Treatment   Occupation: Retired   Recreation: pool, [employer]g, shuffle board  ADLs: self  Terrell: yes  Exercise: none  Other: none    Current Functional Status  Occupation: Retired  Recreation: none  ADLs: self  Terrell: yes  Exercise: none  Other: none    Short Term Program Goals: increase strength and endurance to return to club house to play pool, [employer]g and shuffleboard  Long Term Goals: Increase endurance to walk a longer distance than 995 feet in 6 minutes in 10 to 12 weeks    Ability to reach goals/rehabilitation potential: yes    Projected return to function:   Objective tests: 6 minute walk test, sub max treadmill test, arm curl and chair to stand test        Nutritional   Fats/Oils: butter  Sodium: all processed prepackage foods, patient stated he only eats food that he microwaves or grills   Mostly prepackaged foods  Sweets/ETOH/Caffeine: enjoys sweets (has decreased amount), no ETOH, 4 to 5 cups of coffee a day Dairy/Eggs: whole milk, 4 eggs a week  Meats:    Beef: 4 to 5 servings a week   Fish: none  Chicken/Turkey: 0 to 1 servings a week  Pork/Ham: 0 to 1 servings a week  Processed Meats: deli meats twice a week  Fruits: 0 to 1 servings a week  Vegetables: o to 1 servings a week  Grains/Beans: frosted flakes, oatmeal, white bread, occasional Rye bread, sweet potatoes, french fries  Supplements: none  Social: Cook self, Shop daughter and Dining out none prior to illness 3 to 4 times a week lilian    Clinical Implications: Pt refuse to change diet at this time    Goals: none at this time    Emotional/Social  Marital status:    Rate 1-5:    Marriage: 1 Values/Choices    Family: 3 Values/Choices helpd grown son in [address]: 5 Values/Choices some stress due to always assisting son with finances   Relationships: 3 Values/Choices 3   Spirituality: none   Intellectual: 3 Values/Choices none    Life Stressors: 3    Goals: none at this time    Domestic Violence Screening: No    Comments: denies abuse

## 2018-02-06 NOTE — PROGRESS NOTES
Cardiac/Pulmonary Rehabilitation Plan of Care    Initial      Today's date: 2018   Visits: Initial  Patient name: Rox Torres      : 1945  Age: 75 y o  MRN: [id number]  Referring Physician: Aayush Moncada MD  Provider: Negar Ferrer Pul[facility]  Clinician: Birdie Andres RN    Dx:   Encounter Diagnosis   Name Primary?  Chronic obstructive pulmonary disease, unspecified COPD type ([email or web address] ) Yes     Date of onset: 2004    Medication compliance: Yes   Comments: none  Fall Risk: Yes   Comments: Oxygen tubing, and dyspnea with minimal exertion    EXERCISE/ACTIVITY    Cardiovascular:   Min: 20   METS: 2 4   Hr: 127   RPE: 5   O2 sat: 96    Modalities: Treadmill, AD bike, UBE, NuStep and Recumbent bike  Strength trainin-3 days / week, 12-15 repitations  and 1-2 sets per modality    Modalities: Arm curl and upright rows, shrugs lateral and frontal raise  EKG changes: N/A  Dyspnea score: 5  Home activity: none  Goals: Increase endurance and strength to return to the club house to play pool, ping pong and shuffle board  Increase endurance to walk a longer distance than 995 feet in 6 minutes in 12 weeks  Education: explained pulmonary rehabilitation and how to use cardiovascular equipment, discussed relaxation breathing  Plan: treadmill, 2 0 mph with a 2 % incline 6 to 10 minutes, recumbent bike 10 minutes level 2 or airdyne bike 10 minutes, arm ergometer 5 minutes level 2, nu-step for 10 minutes level 5   Increase time and resistance as tolerated  Readiness to change: 10    NUTRITION    Weight control:    Starting weight: 176 pounds   Current weight: Weight - Scale: 79 8 kg (176 lb)   Waist circumference:    Startin 5 inches   Current: Waist circumference (inches): 41 5 Inches  Diabetes: none  Lipid management: none  Goals: choose lower fat beef products and processed foods  Education: rate your plate, discussed sodium and fat in foods  Plan: decreased the amount of fat and processed food in diet  Readiness to change: 2    PSYCHOSOCIAL    Emotional: PHQ-9 Total Score: 14  Self-reported stress level: 5   Social support: daughter  Goals: improve depression by increasing endurance to return to activities at the clubhouse  Education: none  Plan: provide positive encouragement and relaxation techniques  Readiness to change: 5    OTHER CORE COMPONENTS     Tobacco:   History   Smoking Status    Former Smoker    Packs/day: 1 00    Years: 60 00    Quit date: 8/31/2017   Smokeless Tobacco    Never Used     Comment: quit in august 2017     Blood pressure:    Resting: Resting BP: 158/84   Exercise: Recovery BP: 148/84  Goals: none  Education: none  Plan: none  Readiness to change: 1   CAT score 26

## 2018-02-06 NOTE — PROGRESS NOTES
Eric Frank   1945     Risk: moderate     Pre Post % Change Goal   Date: 2/6/2018      Physical       Sub Max ETT (mets) 3 1   10% increase   6MWT (feet) 995 feet   10% increase   UCSD Dyspnea Score UCSD SOB Total Score: 77   5 pt decrease   Supplemental O2 use (L) O2 Flow Rate (L/min): 3 L/min      [facility] (est peak O2) 3 63      Peak exercise CR/OR (mets) 2 4   40% increase   Emotional       PHQ9 (> 10 refer to MD) 14   4 pt decrease   [place] (lower score = improvement)       Total 32   < 27   Feelings 2   < 3   Physical Fitness 4   < 3   Social Support 5   < 3   Daily Activities 3   < 3   Social Activities 4   < 3   Pain 3   < 3   Overall Health 4   < 3   Quality of Life 3   < 3   Change in Health 4   < 3   Dietary       Rate your plate 40    > 58   Measurements       Weight Weight - Scale: 79 8 kg (176 lb)   2 5 - 5%   BMI 21 7   19 - 25   Waist Circ  41 5 inches   < 40 M / < 35 F   % Body fat Body Fat %: 31 3 %   < 25 M / < 33 F   BP left arm               (systolic) 846   < 413   (diastolic) 84   < 90   Smoking #/day  (if applicable) 0   0   Lipids/Glucose (Date)       Total cholesterol    50 - 200   Triglycerides    < 150   HDL    40 - 60   LDL    < 100   A1C    4 0 - 5 6%   Fasting

## 2018-02-12 ENCOUNTER — HOSPITAL ENCOUNTER (OUTPATIENT)
Dept: RADIOLOGY | Age: 73
Discharge: HOME | End: 2018-02-12
Payer: MEDICARE

## 2018-02-12 DIAGNOSIS — D38.1 NEOPLASM OF UNCERTAIN BEHAVIOR OF TRACHEA, BRONCHUS, AND LUNG: ICD-10-CM

## 2018-02-12 LAB — GLUCOSE SERPL-MCNC: 103 MG/DL (ref 65–140)

## 2018-02-12 PROCEDURE — 82948 REAGENT STRIP/BLOOD GLUCOSE: CPT

## 2018-02-12 PROCEDURE — 78815 PET IMAGE W/CT SKULL-THIGH: CPT

## 2018-02-12 PROCEDURE — A9552 F18 FDG: HCPCS

## 2018-02-12 RX ADMIN — IOHEXOL 5 ML: 240 INJECTION, SOLUTION INTRATHECAL; INTRAVASCULAR; INTRAVENOUS; ORAL at 07:20

## 2018-02-13 ENCOUNTER — OFFICE VISIT (OUTPATIENT)
Dept: FAMILY MEDICINE CLINIC | Facility: CLINIC | Age: 73
End: 2018-02-13
Payer: MEDICARE

## 2018-02-13 VITALS
DIASTOLIC BLOOD PRESSURE: 76 MMHG | WEIGHT: 175 LBS | BODY MASS INDEX: 20.66 KG/M2 | SYSTOLIC BLOOD PRESSURE: 170 MMHG | HEART RATE: 115 BPM | OXYGEN SATURATION: 98 % | HEIGHT: 77 IN

## 2018-02-13 DIAGNOSIS — J44.9 MODERATE COPD (CHRONIC OBSTRUCTIVE PULMONARY DISEASE) (HCC): ICD-10-CM

## 2018-02-13 DIAGNOSIS — R07.9 CHEST PAIN AT REST: ICD-10-CM

## 2018-02-13 DIAGNOSIS — I10 ESSENTIAL HYPERTENSION: ICD-10-CM

## 2018-02-13 DIAGNOSIS — R91.8 LUNG NODULES: Primary | ICD-10-CM

## 2018-02-13 PROCEDURE — 99214 OFFICE O/P EST MOD 30 MIN: CPT | Performed by: FAMILY MEDICINE

## 2018-02-13 NOTE — PROGRESS NOTES
Assessment/Plan:    Will f/u with pulm  Cont O2 cont meds chest pain unlikely cardiac but  May need  w/u       There are no diagnoses linked to this encounter  Subjective:      Patient ID: Cash Sloan is a 75 y o  male  Patient seen in f/u  Has had pulm f/u and  Pet scan tests  done which showed  Hypermetabolic nodule  rec bx   Breathing about the same  Having central chest discomfort which is fairly consant   Otherwise about the same  Okay on meds        The following portions of the patient's history were reviewed and updated as appropriate: past family history, past medical history, past social history and past surgical history  Review of Systems   Constitutional: Positive for appetite change and fatigue  HENT: Negative  Eyes: Negative  Respiratory: Positive for shortness of breath  Cardiovascular: Positive for chest pain  Musculoskeletal: Positive for back pain  Skin: Negative  Neurological: Positive for dizziness  Psychiatric/Behavioral: Negative  Objective:    Vitals:    02/13/18 0834   BP: 170/76   Pulse: (!) 115   SpO2: 98%        Physical Exam   Constitutional:   Thin male   HENT:   Head: Normocephalic and atraumatic  Right Ear: External ear normal    Left Ear: External ear normal    Eyes: Conjunctivae are normal  Pupils are equal, round, and reactive to light  Neck: Normal range of motion  Neck supple  Cardiovascular: Normal rate, regular rhythm and normal heart sounds  Pulmonary/Chest:   Some dyspnea   Abdominal: Soft   Bowel sounds are normal

## 2018-02-14 ENCOUNTER — CLINICAL SUPPORT (OUTPATIENT)
Dept: PULMONOLOGY | Facility: CLINIC | Age: 73
End: 2018-02-14
Payer: MEDICARE

## 2018-02-14 ENCOUNTER — APPOINTMENT (EMERGENCY)
Dept: RADIOLOGY | Facility: HOSPITAL | Age: 73
DRG: 176 | End: 2018-02-14
Payer: MEDICARE

## 2018-02-14 ENCOUNTER — HOSPITAL ENCOUNTER (INPATIENT)
Facility: HOSPITAL | Age: 73
LOS: 4 days | Discharge: HOME | DRG: 176 | End: 2018-02-18
Attending: EMERGENCY MEDICINE | Admitting: FAMILY MEDICINE
Payer: MEDICARE

## 2018-02-14 DIAGNOSIS — R26.9 GAIT DIFFICULTY: ICD-10-CM

## 2018-02-14 DIAGNOSIS — R91.8 LUNG NODULES: ICD-10-CM

## 2018-02-14 DIAGNOSIS — I26.99 PULMONARY EMBOLISM (HCC): ICD-10-CM

## 2018-02-14 DIAGNOSIS — J44.9 COPD (CHRONIC OBSTRUCTIVE PULMONARY DISEASE) (HCC): ICD-10-CM

## 2018-02-14 DIAGNOSIS — R06.03 RESPIRATORY DISTRESS: Primary | ICD-10-CM

## 2018-02-14 DIAGNOSIS — J44.9 MODERATE COPD (CHRONIC OBSTRUCTIVE PULMONARY DISEASE) (HCC): ICD-10-CM

## 2018-02-14 DIAGNOSIS — D49.1 LUNG NEOPLASM: ICD-10-CM

## 2018-02-14 LAB
ALBUMIN SERPL BCP-MCNC: 3.9 G/DL (ref 3.5–5)
ALP SERPL-CCNC: 56 U/L (ref 46–116)
ALT SERPL W P-5'-P-CCNC: 38 U/L (ref 12–78)
ANION GAP SERPL CALCULATED.3IONS-SCNC: 12 MMOL/L (ref 4–13)
APTT PPP: 25 SECONDS (ref 23–35)
AST SERPL W P-5'-P-CCNC: 22 U/L (ref 5–45)
BASOPHILS # BLD AUTO: 0 THOUSANDS/ΜL (ref 0–0.1)
BASOPHILS NFR BLD AUTO: 0 % (ref 0–1)
BILIRUB SERPL-MCNC: 1.2 MG/DL (ref 0.2–1)
BUN SERPL-MCNC: 12 MG/DL (ref 5–25)
CALCIUM SERPL-MCNC: 9.4 MG/DL (ref 8.3–10.1)
CHLORIDE SERPL-SCNC: 103 MMOL/L (ref 100–108)
CO2 SERPL-SCNC: 26 MMOL/L (ref 21–32)
CREAT SERPL-MCNC: 1.08 MG/DL (ref 0.6–1.3)
EOSINOPHIL # BLD AUTO: 0.1 THOUSAND/ΜL (ref 0–0.61)
EOSINOPHIL NFR BLD AUTO: 1 % (ref 0–6)
ERYTHROCYTE [DISTWIDTH] IN BLOOD BY AUTOMATED COUNT: 14.3 % (ref 11.6–15.1)
GFR SERPL CREATININE-BSD FRML MDRD: 67 ML/MIN/1.73SQ M
GLUCOSE SERPL-MCNC: 104 MG/DL (ref 65–140)
HCT VFR BLD AUTO: 40.2 % (ref 42–52)
HGB BLD-MCNC: 13.7 G/DL (ref 14–18)
INR PPP: 1.06 (ref 0.86–1.16)
LYMPHOCYTES # BLD AUTO: 1.2 THOUSANDS/ΜL (ref 0.6–4.47)
LYMPHOCYTES NFR BLD AUTO: 16 % (ref 14–44)
MCH RBC QN AUTO: 32.5 PG (ref 27–31)
MCHC RBC AUTO-ENTMCNC: 34.1 G/DL (ref 31.4–37.4)
MCV RBC AUTO: 95 FL (ref 82–98)
MONOCYTES # BLD AUTO: 0.6 THOUSAND/ΜL (ref 0.17–1.22)
MONOCYTES NFR BLD AUTO: 7 % (ref 4–12)
NEUTROPHILS # BLD AUTO: 5.8 THOUSANDS/ΜL (ref 1.85–7.62)
NEUTS SEG NFR BLD AUTO: 76 % (ref 43–75)
NRBC BLD AUTO-RTO: 0 /100 WBCS
PLATELET # BLD AUTO: 198 THOUSANDS/UL (ref 130–400)
PMV BLD AUTO: 5.9 FL (ref 8.9–12.7)
POTASSIUM SERPL-SCNC: 3.5 MMOL/L (ref 3.5–5.3)
PROT SERPL-MCNC: 7.1 G/DL (ref 6.4–8.2)
PROTHROMBIN TIME: 11.1 SECONDS (ref 9.4–11.7)
RBC # BLD AUTO: 4.22 MILLION/UL (ref 4.7–6.1)
SODIUM SERPL-SCNC: 141 MMOL/L (ref 136–145)
TROPONIN I SERPL-MCNC: <0.02 NG/ML
WBC # BLD AUTO: 7.7 THOUSAND/UL (ref 4.8–10.8)

## 2018-02-14 PROCEDURE — 71045 X-RAY EXAM CHEST 1 VIEW: CPT

## 2018-02-14 PROCEDURE — 85610 PROTHROMBIN TIME: CPT | Performed by: EMERGENCY MEDICINE

## 2018-02-14 PROCEDURE — 36415 COLL VENOUS BLD VENIPUNCTURE: CPT | Performed by: EMERGENCY MEDICINE

## 2018-02-14 PROCEDURE — 87081 CULTURE SCREEN ONLY: CPT | Performed by: STUDENT IN AN ORGANIZED HEALTH CARE EDUCATION/TRAINING PROGRAM

## 2018-02-14 PROCEDURE — 93005 ELECTROCARDIOGRAM TRACING: CPT

## 2018-02-14 PROCEDURE — 71275 CT ANGIOGRAPHY CHEST: CPT

## 2018-02-14 PROCEDURE — 85025 COMPLETE CBC W/AUTO DIFF WBC: CPT | Performed by: EMERGENCY MEDICINE

## 2018-02-14 PROCEDURE — 99223 1ST HOSP IP/OBS HIGH 75: CPT | Performed by: FAMILY MEDICINE

## 2018-02-14 PROCEDURE — G0424 PULMONARY REHAB W EXER: HCPCS

## 2018-02-14 PROCEDURE — 99285 EMERGENCY DEPT VISIT HI MDM: CPT

## 2018-02-14 PROCEDURE — 84484 ASSAY OF TROPONIN QUANT: CPT | Performed by: EMERGENCY MEDICINE

## 2018-02-14 PROCEDURE — 94640 AIRWAY INHALATION TREATMENT: CPT

## 2018-02-14 PROCEDURE — 85730 THROMBOPLASTIN TIME PARTIAL: CPT | Performed by: EMERGENCY MEDICINE

## 2018-02-14 PROCEDURE — 80053 COMPREHEN METABOLIC PANEL: CPT | Performed by: EMERGENCY MEDICINE

## 2018-02-14 PROCEDURE — 96374 THER/PROPH/DIAG INJ IV PUSH: CPT

## 2018-02-14 RX ORDER — IPRATROPIUM BROMIDE AND ALBUTEROL SULFATE 2.5; .5 MG/3ML; MG/3ML
3 SOLUTION RESPIRATORY (INHALATION) ONCE
Status: COMPLETED | OUTPATIENT
Start: 2018-02-14 | End: 2018-02-14

## 2018-02-14 RX ORDER — IPRATROPIUM BROMIDE AND ALBUTEROL SULFATE 2.5; .5 MG/3ML; MG/3ML
3 SOLUTION RESPIRATORY (INHALATION)
Status: DISCONTINUED | OUTPATIENT
Start: 2018-02-14 | End: 2018-02-18 | Stop reason: HOSPADM

## 2018-02-14 RX ORDER — FLUTICASONE PROPIONATE 50 MCG
1 SPRAY, SUSPENSION (ML) NASAL DAILY
Status: DISCONTINUED | OUTPATIENT
Start: 2018-02-15 | End: 2018-02-18 | Stop reason: HOSPADM

## 2018-02-14 RX ORDER — TAMSULOSIN HYDROCHLORIDE 0.4 MG/1
0.4 CAPSULE ORAL
Status: DISCONTINUED | OUTPATIENT
Start: 2018-02-14 | End: 2018-02-18 | Stop reason: HOSPADM

## 2018-02-14 RX ORDER — MECLIZINE HCL 12.5 MG/1
12.5 TABLET ORAL DAILY
Status: DISCONTINUED | OUTPATIENT
Start: 2018-02-15 | End: 2018-02-18 | Stop reason: HOSPADM

## 2018-02-14 RX ORDER — METHYLPREDNISOLONE SODIUM SUCCINATE 125 MG/2ML
125 INJECTION, POWDER, LYOPHILIZED, FOR SOLUTION INTRAMUSCULAR; INTRAVENOUS ONCE
Status: COMPLETED | OUTPATIENT
Start: 2018-02-14 | End: 2018-02-14

## 2018-02-14 RX ORDER — PANTOPRAZOLE SODIUM 40 MG/1
40 TABLET, DELAYED RELEASE ORAL
Status: DISCONTINUED | OUTPATIENT
Start: 2018-02-15 | End: 2018-02-18 | Stop reason: HOSPADM

## 2018-02-14 RX ORDER — AMLODIPINE BESYLATE 5 MG/1
5 TABLET ORAL EVERY MORNING
Status: DISCONTINUED | OUTPATIENT
Start: 2018-02-15 | End: 2018-02-18 | Stop reason: HOSPADM

## 2018-02-14 RX ADMIN — IPRATROPIUM BROMIDE AND ALBUTEROL SULFATE 3 ML: .5; 3 SOLUTION RESPIRATORY (INHALATION) at 16:21

## 2018-02-14 RX ADMIN — IPRATROPIUM BROMIDE AND ALBUTEROL SULFATE 3 ML: .5; 3 SOLUTION RESPIRATORY (INHALATION) at 17:34

## 2018-02-14 RX ADMIN — IOHEXOL 85 ML: 350 INJECTION, SOLUTION INTRAVENOUS at 16:57

## 2018-02-14 RX ADMIN — ENOXAPARIN SODIUM 80 MG: 80 INJECTION SUBCUTANEOUS at 21:21

## 2018-02-14 RX ADMIN — METHYLPREDNISOLONE SODIUM SUCCINATE 125 MG: 125 INJECTION, POWDER, FOR SOLUTION INTRAMUSCULAR; INTRAVENOUS at 16:23

## 2018-02-14 RX ADMIN — FLUTICASONE PROPIONATE AND SALMETEROL 1 PUFF: 50; 250 POWDER RESPIRATORY (INHALATION) at 21:21

## 2018-02-14 RX ADMIN — TAMSULOSIN HYDROCHLORIDE 0.4 MG: 0.4 CAPSULE ORAL at 21:21

## 2018-02-14 NOTE — ED CARE HANDOFF
Emergency Department Sign Out Note        Sign out and transfer of care from **Dr Charlie Jorgensen*  See Separate Emergency Department note  The patient, Austen Dorantes, was evaluated by the previous provider for **sob/resp  distress*  Workup Completed:  *labs, nebs**    ED Course / Workup Pending (followup):  *CTA**                          ED Course      Procedures  MDM  Number of Diagnoses or Management Options  COPD (chronic obstructive pulmonary disease) ([place] [address] ):   Lung nodules:   Pulmonary embolism [facility]):   Respiratory distress:   Diagnosis management comments: Received call from radiologist who advised there is a PE in left distal main  Pt  Feeling better with nebs and O2  Will admit  Discussed with [facility], Dr Anil Wild  [facility] Time      Disposition  Final diagnoses:   Respiratory distress   COPD (chronic obstructive pulmonary disease) ([facility])   Lung nodules   Pulmonary embolism ([place] [address] )     Time reflects when diagnosis was documented in both MDM as applicable and the Disposition within this note     Time User Action Codes Description Comment    9/13/7989  0:90 PM Julia Ped Add [T67 44] Respiratory distress     8/35/4857  5:49 PM Julia Ped Add [G86 7] COPD (chronic obstructive pulmonary disease) ([place] [address] )     5/26/6884  6:11 PM Julia Ped Add [Y20 5] Lung nodules     0/27/9189  0:11 PM Julia Ped Add [J86 07] Pulmonary embolism [facility])       ED Disposition     ED Disposition Condition Comment    Admit  Case was discussed with *Dr Anil Wild** and the patient's admission status was agreed to be Admission Status: inpatient status         Follow-up Information    None       Patient's Medications   Discharge Prescriptions    No medications on file     No discharge procedures on file         ED Provider  Electronically Signed by     Titus Singh MD  63/39/56 [id number]

## 2018-02-14 NOTE — ED PROCEDURE NOTE
PROCEDURE  ECG 12 Lead Documentation  Date/Time: 2/14/2018 4:21 PM  Performed by: Jaimee Aquino  Authorized by: Jaimee Aquino     ECG reviewed by me, the ED Provider: yes    Patient location:  ED  Interpretation:     Interpretation: normal    Rate:     ECG rate:  73    ECG rate assessment: normal    Rhythm:     Rhythm: sinus rhythm    Ectopy:     Ectopy: none    QRS:     QRS axis:  Normal    QRS intervals:  Normal  Conduction:     Conduction: normal    ST segments:     ST segments:  Normal  T waves:     T waves: normal           Mely Naidu DO  02/14/18 1622

## 2018-02-14 NOTE — PROGRESS NOTES
Pt very short of breath during Pulmonary Rehabilitation states he is short of breath RPD 4  Contacted Dr Joselin Barnes office and they informed him to go to ER for evaluation  Pt refuse squad, stated he will drive himself  O2 Sat 99% on 3l/min via nc  Audible wheeze

## 2018-02-14 NOTE — H&P
H&P Exam - Eric Frank 75 y o  male MRN: [id number]    Unit/Bed#: ED 04 Encounter: [id number]    Assessment:  Adriana Waldrop 65-year-old male past medical history COPD, alpha-1 antitrypsin deficiency, hypertension, and GERD who presents with shortness of breath secondary to pulmonary embolism  He will be admitted to inpatient under the service of Dr Pietro Donahue, and is expected to stay greater than 2 midnights  He is full code and expected to return home upon discharge  Plan:  Shortness of breath likely secondary to pulmonary embolism  -CTA chest:  Acute PE in distal left main pulmonary artery and proximal left lower lobe segmental branches  Stable right upper and left lower pulmonary nodules  -chest x-ray:  COPD, right upper lobe nodule density, no acute infiltrates  -troponin negative x1, EKG within normal limits  -therapeutic Lovenox 1 mg/kg bid  -oxygen as needed for comfort  Neoplasm of uncertain behavior of trachea, bronchus, and lung  -follows with Dr Cass Guillory outpatient  -PET Ct:  Hypermetabolic nodules  -evident on CTA chest - right upper and left lower lobes measuring 1 2 x 1 3 centimeter  Tissue sampling of right upper lobe nodule is planned  Three-month follow-up of left lower lobe nodule as previously recommended  -pulmonology consulted  Alpha 1 antitrypsin deficiency  -continue with weekly Zemaira on Tuesdays  -pulmonology consult  COPD:   -continue with Advair and duonebs, hold Spiriva  Hypertension:  Stable continue with amlodipine 5 milligrams daily  GERD:  Stable continue with omeprazole 20  BPH:  Stable continue with Flomax 0 4 milligrams daily  Global:  Regular diet, Lovenox and SCDs    History of Present Illness   Adriana Waldrop 65-year-old male past medical history COPD, alpha-1 antitrypsin deficiency, hypertension, and GERD who presents with shortness of breath worsening over last couple of days  Was unable to perform exercises at pulmonary rehab, and became acutely shortness of breath   Has been experiencing centralized chest pain for many weeks  Patient describes it as a burning sensation similar to heartburn  No nausea, vomiting, lightheadedness, or dizziness  Follows with Dr Gualberto Singh to outpatient for workup of pulmonary nodules  No recent weight loss, however has a decreased appetite  Ed: duoneb x 2, Solumedrol 125mg  Review of Systems   Constitutional: Positive for activity change, appetite change and fatigue  Negative for chills and fever  HENT: Negative for congestion and rhinorrhea  Eyes: Positive for visual disturbance  Respiratory: Positive for shortness of breath and wheezing  Negative for cough  Cardiovascular: Positive for chest pain  Negative for palpitations and leg swelling  Gastrointestinal: Positive for diarrhea  Negative for abdominal distention, abdominal pain, nausea and vomiting  Genitourinary: Positive for dysuria  Musculoskeletal: Negative for arthralgias  Neurological: Positive for weakness  Negative for dizziness, numbness and headaches  Historical Information   Past Medical History:   Diagnosis Date    Anesthesia complication     Difficult to wake up    Arthritis     BPH (benign prostatic hyperplasia)     urinary frequency    Cancer (HCC)     basal cell neck, face    COPD (chronic obstructive pulmonary disease) (HCC)     Full dentures     Hypertension     controlled    Kidney stone     at least 7 episodes    Liver disease     Alpha 1- enzyme deficiency - diagnosed 2002  has been on weekly replacement therapy since then    Pulmonary emphysema ([facility] )     1/25/15  FEV1 - 2 45 liters or 59% of predicted    Wears glasses     for driving only     Past Surgical History:   Procedure Laterality Date    BACK SURGERY  2008    discectomy    COLONOSCOPY      COLONOSCOPY N/A 3/10/2017    Procedure: Hilda Abel;  Surgeon: Pete Burt MD;  [facility] GI LAB;   Service:     CYSTOSCOPY      removal of kidney stones    ESOPHAGOGASTRODUODENOSCOPY N/A 3/10/2017    Procedure: ESOPHAGOGASTRODUODENOSCOPY (EGD); Surgeon: Yakelin Matthew MD;  Location: [facility] GI LAB; Service:     LITHOTRIPSY      TONSILLECTOMY      VEIN LIGATION AND STRIPPING Bilateral     1980's     Social History   History   Alcohol Use No     History   Drug Use No     History   Smoking Status    Former Smoker    Packs/day: 1 00    Years: 60 00    Quit date: 8/31/2017   Smokeless Tobacco    Never Used     Comment: quit in august 2017     Family History:   Family History   Problem Relation Age of Onset    Emphysema Mother      never smoked    Emphysema Father     Cancer Brother      colon       Meds/Allergies   PTA meds:   Prior to Admission Medications   Prescriptions Last Dose Informant Patient Reported? Taking?    Alpha1-Proteinase Inhibitor (ZEMAIRA IV)  Self Yes Yes   Sig: Infuse into a venous catheter once a week On tuesday -1000mg /50 ml    ZEMAIRA injection   Yes Yes   albuterol (2 5 mg/3 mL) 0 083 % nebulizer solution   Yes Yes   Sig: Inhale 1 each every 4 (four) hours as needed   albuterol (PROVENTIL HFA,VENTOLIN HFA) 90 mcg/act inhaler   No Yes   Sig: Inhale 2 puffs every 6 (six) hours as needed for wheezing   amLODIPine (NORVASC) 5 mg tablet   No Yes   Sig: Take 1 tablet (5 mg total) by mouth every morning   fluticasone (FLONASE) 50 mcg/act nasal spray   Yes Yes   fluticasone-salmeterol (ADVAIR DISKUS) 250-50 mcg/dose inhaler   Yes Yes   Sig: Inhale   meclizine (ANTIVERT) 12 5 MG tablet   Yes Yes   Sig: Take 1 tablet by mouth daily   omeprazole (PriLOSEC) 40 MG capsule   Yes Yes   tamsulosin (FLOMAX) 0 4 mg  Self Yes Yes   Sig: Take 0 4 mg by mouth daily at bedtime      Facility-Administered Medications: None     Allergies   Allergen Reactions    Chantix [Varenicline]      Caused prostate infection       Objective   First Vitals:   Blood Pressure: (!) 184/89 (02/14/18 1548)  Pulse: 75 (02/14/18 1548)  Temperature: 97 7 °F (36 5 °C) (02/14/18 1548)  Respirations: (!) 24 (02/14/18 1548)  Height: 6' 5" (195 6 cm) (02/14/18 1548)  Weight - Scale: 79 4 kg (175 lb) (02/14/18 1548)  SpO2: 98 % (02/14/18 1548)    Current Vitals:   Blood Pressure: (!) 181/86 (02/14/18 1730)  Pulse: 80 (02/14/18 1730)  Temperature: 97 7 °F (36 5 °C) (02/14/18 1548)  Respirations: 20 (02/14/18 1730)  Height: 6' 5" (195 6 cm) (02/14/18 1548)  Weight - Scale: 79 4 kg (175 lb) (02/14/18 1548)  SpO2: 100 % (02/14/18 1730)    No intake or output data in the 24 hours ending 02/14/18 1812    Invasive Devices     Peripheral Intravenous Line            Peripheral IV 02/14/18 Left Antecubital less than 1 day                Physical Exam   Constitutional: He is oriented to person, place, and time  He appears well-developed and well-nourished  No distress  Tremor at rest   HENT:   Head: Normocephalic and atraumatic  Neck: Neck supple  Cardiovascular: Normal rate, regular rhythm, normal heart sounds and intact distal pulses  Exam reveals no gallop and no friction rub  No murmur heard  Pulmonary/Chest: Effort normal  No respiratory distress  Decreased breath sounds bilaterally, no wheezing or rhonchi    Abdominal: Soft  Bowel sounds are normal  He exhibits no distension  There is no tenderness  Musculoskeletal: He exhibits no edema  Neurological: He is alert and oriented to person, place, and time  Skin: Skin is warm and dry  No rash noted  He is not diaphoretic  No erythema  Psychiatric: He has a normal mood and affect   His behavior is normal        Lab Results:   Results for orders placed or performed during the hospital encounter of 02/14/18   CBC and differential   Result Value Ref Range    WBC 7 70 4 80 - 10 80 Thousand/uL    RBC 4 22 (L) 4 70 - 6 10 Million/uL    Hemoglobin 13 7 (L) 14 0 - 18 0 g/dL    Hematocrit 40 2 (L) 42 0 - 52 0 %    MCV 95 82 - 98 fL    MCH 32 5 (H) 27 0 - 31 0 pg    MCHC 34 1 31 4 - 37 4 g/dL    RDW 14 3 11 6 - 15 1 %    MPV 5 9 (L) 8 9 - 12 7 fL    Platelets 897 302 - 179 Thousands/uL    nRBC 0 /100 WBCs    Neutrophils Relative 76 (H) 43 - 75 %    Lymphocytes Relative 16 14 - 44 %    Monocytes Relative 7 4 - 12 %    Eosinophils Relative 1 0 - 6 %    Basophils Relative 0 0 - 1 %    Neutrophils Absolute 5 80 1 85 - 7 62 Thousands/µL    Lymphocytes Absolute 1 20 0 60 - 4 47 Thousands/µL    Monocytes Absolute 0 60 0 17 - 1 22 Thousand/µL    Eosinophils Absolute 0 10 0 00 - 0 61 Thousand/µL    Basophils Absolute 0 00 0 00 - 0 10 Thousands/µL   Comprehensive metabolic panel   Result Value Ref Range    Sodium 141 136 - 145 mmol/L    Potassium 3 5 3 5 - 5 3 mmol/L    Chloride 103 100 - 108 mmol/L    CO2 26 21 - 32 mmol/L    Anion Gap 12 4 - 13 mmol/L    BUN 12 5 - 25 mg/dL    Creatinine 1 08 0 60 - 1 30 mg/dL    Glucose 104 65 - 140 mg/dL    Calcium 9 4 8 3 - 10 1 mg/dL    AST 22 5 - 45 U/L    ALT 38 12 - 78 U/L    Alkaline Phosphatase 56 46 - 116 U/L    Total Protein 7 1 6 4 - 8 2 g/dL    Albumin 3 9 3 5 - 5 0 g/dL    Total Bilirubin 1 20 (H) 0 20 - 1 00 mg/dL    eGFR 67 ml/min/1 73sq m   Troponin I   Result Value Ref Range    Troponin I <0 02 <=0 04 ng/mL   Protime-INR   Result Value Ref Range    Protime 11 1 9 4 - 11 7 seconds    INR 1 06 0 86 - 1 16   APTT   Result Value Ref Range    PTT 25 23 - 35 seconds     Imaging:   CTA ED chest PE study   Final Result         1  Acute pulmonary embolus in the distal left main pulmonary artery and proximal left lower lobe segmental branches  2   Moderate panlobular emphysema  3   Stable right upper and left lower pulmonary nodules measuring 1 2 x 1 3 cm  PET/CT was performed, based on current Fleischner Society 2017 Guidelines on incidental pulmonary nodule  Tissue sampling of the right upper lobe hypermetabolic nodule is    planned  3 month follow-up of left lower lobe pulmonary nodule as previously recommended                  I personally discussed this study with Dr Chantal Blakely on 2/14/2018 at 5:34 PM  Workstation performed: [id number]         XR chest 1 view portable   Final Result      COPD  Right upper lobe nodular density  See previous CT recommendations  No acute infiltrates  Workstation performed: [id number]               Code Status: full code    Counseling / Coordination of Care: Total floor / unit time spent today 35 minutes

## 2018-02-15 ENCOUNTER — APPOINTMENT (INPATIENT)
Dept: RADIOLOGY | Facility: HOSPITAL | Age: 73
DRG: 176 | End: 2018-02-15
Payer: MEDICARE

## 2018-02-15 LAB
ALBUMIN SERPL BCP-MCNC: 3.3 G/DL (ref 3.5–5)
ALP SERPL-CCNC: 45 U/L (ref 46–116)
ALT SERPL W P-5'-P-CCNC: 32 U/L (ref 12–78)
ANION GAP SERPL CALCULATED.3IONS-SCNC: 10 MMOL/L (ref 4–13)
AST SERPL W P-5'-P-CCNC: 15 U/L (ref 5–45)
BILIRUB SERPL-MCNC: 1 MG/DL (ref 0.2–1)
BUN SERPL-MCNC: 13 MG/DL (ref 5–25)
C DIFF TOX GENS STL QL NAA+PROBE: NORMAL
CALCIUM SERPL-MCNC: 9.1 MG/DL (ref 8.3–10.1)
CHLORIDE SERPL-SCNC: 103 MMOL/L (ref 100–108)
CO2 SERPL-SCNC: 26 MMOL/L (ref 21–32)
CREAT SERPL-MCNC: 1.12 MG/DL (ref 0.6–1.3)
ERYTHROCYTE [DISTWIDTH] IN BLOOD BY AUTOMATED COUNT: 14 % (ref 11.6–15.1)
GFR SERPL CREATININE-BSD FRML MDRD: 64 ML/MIN/1.73SQ M
GLUCOSE SERPL-MCNC: 141 MG/DL (ref 65–140)
HCT VFR BLD AUTO: 38 % (ref 42–52)
HGB BLD-MCNC: 13 G/DL (ref 14–18)
MAGNESIUM SERPL-MCNC: 1.7 MG/DL (ref 1.6–2.6)
MCH RBC QN AUTO: 32.7 PG (ref 27–31)
MCHC RBC AUTO-ENTMCNC: 34.3 G/DL (ref 31.4–37.4)
MCV RBC AUTO: 95 FL (ref 82–98)
PHOSPHATE SERPL-MCNC: 2.8 MG/DL (ref 2.3–4.1)
PLATELET # BLD AUTO: 200 THOUSANDS/UL (ref 130–400)
PMV BLD AUTO: 6.2 FL (ref 8.9–12.7)
POTASSIUM SERPL-SCNC: 4 MMOL/L (ref 3.5–5.3)
PROT SERPL-MCNC: 6.5 G/DL (ref 6.4–8.2)
RBC # BLD AUTO: 3.99 MILLION/UL (ref 4.7–6.1)
SODIUM SERPL-SCNC: 139 MMOL/L (ref 136–145)
WBC # BLD AUTO: 6.3 THOUSAND/UL (ref 4.8–10.8)

## 2018-02-15 PROCEDURE — 83735 ASSAY OF MAGNESIUM: CPT | Performed by: STUDENT IN AN ORGANIZED HEALTH CARE EDUCATION/TRAINING PROGRAM

## 2018-02-15 PROCEDURE — 94760 N-INVAS EAR/PLS OXIMETRY 1: CPT

## 2018-02-15 PROCEDURE — 93970 EXTREMITY STUDY: CPT

## 2018-02-15 PROCEDURE — 94664 DEMO&/EVAL PT USE INHALER: CPT

## 2018-02-15 PROCEDURE — 84100 ASSAY OF PHOSPHORUS: CPT | Performed by: STUDENT IN AN ORGANIZED HEALTH CARE EDUCATION/TRAINING PROGRAM

## 2018-02-15 PROCEDURE — 99223 1ST HOSP IP/OBS HIGH 75: CPT | Performed by: INTERNAL MEDICINE

## 2018-02-15 PROCEDURE — 85027 COMPLETE CBC AUTOMATED: CPT | Performed by: STUDENT IN AN ORGANIZED HEALTH CARE EDUCATION/TRAINING PROGRAM

## 2018-02-15 PROCEDURE — 87493 C DIFF AMPLIFIED PROBE: CPT | Performed by: FAMILY MEDICINE

## 2018-02-15 PROCEDURE — 80053 COMPREHEN METABOLIC PANEL: CPT | Performed by: STUDENT IN AN ORGANIZED HEALTH CARE EDUCATION/TRAINING PROGRAM

## 2018-02-15 PROCEDURE — 99233 SBSQ HOSP IP/OBS HIGH 50: CPT | Performed by: FAMILY MEDICINE

## 2018-02-15 PROCEDURE — 94640 AIRWAY INHALATION TREATMENT: CPT

## 2018-02-15 RX ORDER — LANOLIN ALCOHOL/MO/W.PET/CERES
3 CREAM (GRAM) TOPICAL
Status: DISCONTINUED | OUTPATIENT
Start: 2018-02-15 | End: 2018-02-18 | Stop reason: HOSPADM

## 2018-02-15 RX ADMIN — FLUTICASONE PROPIONATE 1 SPRAY: 50 SPRAY, METERED NASAL at 11:01

## 2018-02-15 RX ADMIN — PANTOPRAZOLE SODIUM 40 MG: 40 TABLET, DELAYED RELEASE ORAL at 05:47

## 2018-02-15 RX ADMIN — IPRATROPIUM BROMIDE AND ALBUTEROL SULFATE 3 ML: .5; 3 SOLUTION RESPIRATORY (INHALATION) at 02:28

## 2018-02-15 RX ADMIN — TAMSULOSIN HYDROCHLORIDE 0.4 MG: 0.4 CAPSULE ORAL at 22:12

## 2018-02-15 RX ADMIN — ENOXAPARIN SODIUM 80 MG: 80 INJECTION SUBCUTANEOUS at 22:13

## 2018-02-15 RX ADMIN — MECLIZINE HCL 12.5 MG: 12.5 TABLET ORAL at 10:33

## 2018-02-15 RX ADMIN — MELATONIN 3 MG: at 22:13

## 2018-02-15 RX ADMIN — AMLODIPINE BESYLATE 5 MG: 5 TABLET ORAL at 10:33

## 2018-02-15 RX ADMIN — IPRATROPIUM BROMIDE AND ALBUTEROL SULFATE 3 ML: .5; 3 SOLUTION RESPIRATORY (INHALATION) at 15:30

## 2018-02-15 RX ADMIN — FLUTICASONE PROPIONATE AND SALMETEROL 1 PUFF: 50; 250 POWDER RESPIRATORY (INHALATION) at 21:59

## 2018-02-15 RX ADMIN — FLUTICASONE PROPIONATE AND SALMETEROL 1 PUFF: 50; 250 POWDER RESPIRATORY (INHALATION) at 11:01

## 2018-02-15 RX ADMIN — IPRATROPIUM BROMIDE AND ALBUTEROL SULFATE 3 ML: .5; 3 SOLUTION RESPIRATORY (INHALATION) at 23:19

## 2018-02-15 RX ADMIN — IPRATROPIUM BROMIDE AND ALBUTEROL SULFATE 3 ML: .5; 3 SOLUTION RESPIRATORY (INHALATION) at 08:01

## 2018-02-15 RX ADMIN — ENOXAPARIN SODIUM 80 MG: 80 INJECTION SUBCUTANEOUS at 10:33

## 2018-02-15 NOTE — CASE MANAGEMENT
Initial Clinical Review    Admission: Date/Time/Statement: 2/14/18 @ 1800     Orders Placed This Encounter   Procedures    Inpatient Admission (expected length of stay for this patient is greater than two midnights)     Standing Status:   Standing     Number of Occurrences:   1     Order Specific Question:   Admitting Physician     Answer:   Sherryle Kinnier     Order Specific Question:   Level of Care     Answer:   Med Surg [16]     Order Specific Question:   Estimated length of stay     Answer:   More than 2 Midnights     Order Specific Question:   Certification     Answer:   I certify that inpatient services are medically necessary for this patient for a duration of greater than two midnights  See H&P and MD Progress Notes for additional information about the patient's course of treatment  ED: Date/Time/Mode of Arrival:   ED Arrival Information     Expected Arrival Acuity Means of Arrival Escorted By Service Admission Type    - 2/14/2018 15:36 Emergent Ambulance [facility] Emergency    Arrival Complaint    trouble breathing          Chief Complaint:   Chief Complaint   Patient presents with    Shortness of Breath     Patient started with increase in SOB this am   States that he was at the Pulmonogist and couldnt' catch his breath  Coming from Dr Virginia Wolf office  History of Illness: Niko Rosemary 67-year-old male past medical history COPD, alpha-1 antitrypsin deficiency, hypertension, and GERD who presents with shortness of breath worsening over last couple of days  Was unable to perform exercises at pulmonary rehab, and became acutely shortness of breath  Has been experiencing centralized chest pain for many weeks  Patient describes it as a burning sensation similar to heartburn  No nausea, vomiting, lightheadedness, or dizziness  Follows with Dr Isabel Quiroz to outpatient for workup of pulmonary nodules    No recent weight loss, however has a decreased appetite         ED Vital Signs:   ED Triage Vitals   Temperature Pulse Respirations Blood Pressure SpO2   02/14/18 1548 02/14/18 1548 02/14/18 1548 02/14/18 1548 02/14/18 1548   97 7 °F (36 5 °C) 75 (!) 24 (!) 184/89 98 %      Temp Source Heart Rate Source Patient Position - Orthostatic VS BP Location FiO2 (%)   02/14/18 1946 02/14/18 1730 02/14/18 1730 02/14/18 1730 --   Oral Monitor Sitting Right arm       Pain Score       02/14/18 1548       5        Wt Readings from Last 1 Encounters:   02/14/18 79 2 kg (174 lb 9 7 oz)         Abnormal Labs/Diagnostic Test Results: T BILI 1 20 H/H 13 7/40 2  CXR  COPD  Right upper lobe nodular density  See previous CT recommendations  No acute infiltrates  CT CHEST  Acute pulmonary embolus in the distal left main pulmonary artery and proximal left lower lobe segmental branches  2   Moderate panlobular emphysema  3   Stable right upper and left lower pulmonary nodules measuring 1 2 x 1 3 cm  PET/CT was performed, based on current Fleischner Society 2017 Guidelines on incidental pulmonary nodule  Tissue sampling of the right upper lobe hypermetabolic nodule is   planned  3 month follow-up of left lower lobe pulmonary nodule as previously recommended      ED Treatment:   Medication Administration from 02/14/2018 1536 to 02/14/2018 1935       Date/Time Order Dose Route Action Action by Comments     02/14/2018 1621 ipratropium-albuterol (DUO-NEB) 0 5-2 5 mg/3 mL inhalation solution 3 mL 3 mL Nebulization Given Ynes Sharp RN      02/14/2018 1734 ipratropium-albuterol (DUO-NEB) 0 5-2 5 mg/3 mL inhalation solution 3 mL 3 mL Nebulization Given Margie Sands RN      02/14/2018 1623 methylPREDNISolone sodium succinate (Solu-MEDROL) injection 125 mg 125 mg Intravenous Given Ynes Sharp RN      02/14/2018 1657 iohexol (OMNIPAQUE) 350 MG/ML injection (MULTI-DOSE) 85 mL 85 mL Intravenous Given Maksim Champion           Past Medical/Surgical History:    Active Ambulatory Problems     Diagnosis Date Noted    AAT (alpha-1-antitrypsin) deficiency ([facility] ) 12/30/2017    Moderate COPD (chronic obstructive pulmonary disease) ([facility] ) 12/30/2017    Causalgia of lower limb 12/30/2017    Unspecified essential hypertension 12/30/2017    Gastroesophageal reflux disease without esophagitis 01/27/2014    SOB (shortness of breath) 12/30/2017    HTN (hypertension) 12/30/2017    BPH (benign prostatic hyperplasia) 12/30/2017    Liver disease     Lung nodules 01/31/2018    Acute hypoxemic respiratory failure ([facility] ) 01/31/2018    Former smoker 01/31/2018    Chest pain at rest 02/13/2018     Resolved Ambulatory Problems     Diagnosis Date Noted    Gastroesophageal reflux disease 12/30/2017    COPD exacerbation ([facility] ) 12/30/2017    RSV infection 12/31/2017     Past Medical History:   Diagnosis Date    Anesthesia complication     Arthritis     BPH (benign prostatic hyperplasia)     Cancer (HCC)     COPD (chronic obstructive pulmonary disease) (HCC)     Full dentures     Hypertension     Kidney stone     Liver disease     Pulmonary emphysema ([facility] )     Wears glasses        Admitting Diagnosis: Shortness of breath [R06 02]  COPD (chronic obstructive pulmonary disease) (HCC) [J44 9]  Pulmonary embolism ([facility] ) [I26 99]  Respiratory distress [R06 00]  Lung nodules [R91 8]    Age/Sex: 75 y o  male    ATTENDING  Assessment:  Agusto Elder 50-year-old male past medical history COPD, alpha-1 antitrypsin deficiency, hypertension, and GERD who presents with shortness of breath secondary to pulmonary embolism  He will be admitted to inpatient under the service of Dr Eloisa Contreras, and is expected to stay greater than 2 midnights  He is full code and expected to return home upon discharge  Plan:  Shortness of breath likely secondary to pulmonary embolism  -CTA chest:  Acute PE in distal left main pulmonary artery and proximal left lower lobe segmental branches  Stable right upper and left lower pulmonary nodules     -chest x-ray:  COPD, right upper lobe nodule density, no acute infiltrates  -troponin negative x1, EKG within normal limits  -therapeutic Lovenox 1 mg/kg bid  -oxygen as needed for comfort  Neoplasm of uncertain behavior of trachea, bronchus, and lung  -follows with Dr Julieta Ott outpatient  -PET Ct:  Hypermetabolic nodules  -evident on CTA chest - right upper and left lower lobes measuring 1 2 x 1 3 centimeter  Tissue sampling of right upper lobe nodule is planned    Three-month follow-up of left lower lobe nodule as previously recommended  -pulmonology consulted  Alpha 1 antitrypsin deficiency  -continue with weekly Zemaira on Tuesdays  -pulmonology consult  COPD:   -continue with Advair and duonebs, hold Spiriva  Hypertension:  Stable continue with amlodipine 5 milligrams daily  GERD:  Stable continue with omeprazole 20  BPH:  Stable continue with Flomax 0 4 milligrams daily  Global:  Regular diet, Lovenox and SCDs        Admission Orders:  TELE MON  CONSULT PULMONOLOGY  CONSULT ONCOLOGY  VASCULAR DUPLEX B/L LOWER EXTScheduled Meds:   Current Facility-Administered Medications:  amLODIPine 5 mg Oral QAM Alverta Heimlich, MD   enoxaparin 1 mg/kg Subcutaneous Q12H [address] Alverta Heimlich, MD   fluticasone 1 spray Each Nare Daily Alverta Heimlich, MD   fluticasone-salmeterol 1 puff Inhalation Q12H [address] Alverta Heimlich, MD   ipratropium-albuterol 3 mL Nebulization Q6H Alverta Heimlich, MD   meclizine 12 5 mg Oral Daily Alverta Heimlich, MD   pantoprazole 40 mg Oral Early Morning Alverta Heimlich, MD   tamsulosin 0 4 mg Oral HS Alverta Heimlich, MD     Continuous Infusions:    PRN Meds:

## 2018-02-15 NOTE — RESPIRATORY THERAPY NOTE
RT Protocol Note  Isreal Caal 75 y o  male MRN: [id number]  Unit/Bed#: 2 [place] [address] Encounter: [id number]    Assessment    Principal Problem:    Acute pulmonary embolism ([facility] ) left-sided  Active Problems:    AAT (alpha-1-antitrypsin) deficiency (HCC)    Moderate COPD (chronic obstructive pulmonary disease) (HCC)    Gastroesophageal reflux disease without esophagitis    HTN (hypertension)    BPH (benign prostatic hyperplasia)    Lung nodules    Former smoker    Lung neoplasm      Home Pulmonary Medications:  Albuterol neb PRN/ Proventil inhaler/Advair    Past Medical History:   Diagnosis Date    Anesthesia complication     Difficult to wake up    Arthritis     BPH (benign prostatic hyperplasia)     urinary frequency    Cancer (HCC)     basal cell neck, face    COPD (chronic obstructive pulmonary disease) (HCC)     Full dentures     Hypertension     controlled    Kidney stone     at least 7 episodes    Liver disease     Alpha 1- enzyme deficiency - diagnosed 2002  has been on weekly replacement therapy since then    [facility]     1/25/15  FEV1 - 2 45 liters or 59% of predicted    Wears glasses     for driving only     Social History     Social History    Marital status:      Spouse name: N/A    Number of children: N/A    Years of education: N/A     Social History Main Topics    Smoking status: Former Smoker     Packs/day: 1 00     Years: 60 00     Quit date: 8/31/2017    Smokeless tobacco: Never Used      Comment: quit in august 2017    Alcohol use No    Drug use: No    Sexual activity: Not Asked     Other Topics Concern    None     Social History Narrative    None       Subjective         Objective    Physical Exam:        Vitals:  Blood pressure 158/73, pulse 62, temperature 97 6 °F (36 4 °C), temperature source Oral, resp  rate 18, height 6' 5" (1 956 m), weight 79 2 kg (174 lb 9 7 oz), SpO2 97 %            Imaging and other studies: I have personally reviewed pertinent reports              Plan

## 2018-02-15 NOTE — PROGRESS NOTES
[facility] Progress Note - John Bertrand 75 y o  male MRN: [id number]    Unit/Bed#: 2 [place] [address] Encounter: [id number]      Assessment/Plan:    Shortness of breath likely secondary to pulmonary embolism  - CTA chest:  Acute PE in distal left main pulmonary artery and proximal left lower lobe segmental branches  Stable right upper and left lower pulmonary nodules  - Chest x-ray:  COPD, right upper lobe nodule density, no acute infiltrates  - Troponin negative x1, EKG within normal limits  - Continue therapeutic Lovenox 1 mg/kg bid  -  Pt on 3L O2 via NC  as needed   - Venous duplex b/l LE study pending     Neoplasm of uncertain behavior of trachea, bronchus, and lung  - Follows with Dr Maurice Reid outpatient  - PET Ct:  Hypermetabolic nodules  - CTA chest - right upper and left lower lobes measuring 1 2 x 1 3 centimeter  Tissue sampling of right upper lobe nodule is planned  Three-month follow-up of left lower lobe nodule as previously recommended  - Pulmonology consulted    Alpha 1 antitrypsin deficiency  - Continue with weekly Zemaira on Tuesdays  - Pulmonology consult appreciated     COPD   - Continue with Advair 1 puff Q12H and Duonebs Q6H  - Will continue to hold home medication Spiriva    Hypertension  - stable  - Continue with amlodipine 5 mg daily    GERD  - Stable  - Continue Protonix 40 mg PO Daily    BPH  - Stable  - Continue with Flomax 0 4 mg PO qhs      Global  - Regular diet  - replete electrolytes as necessary  - Lovenox and SCDs       Subjective:   Patient seen and examined at bedside  Patient has significant shortness of breath during encounter  Patient is currently on 2 L oxygen via nasal cannula  Patient states he has increasingly more short of breath when he exerts himself, such as when he got back into bed for be using the restroom  Denies fever, chills, chest pain, abdominal pain, urinary changes and constipation      Objective:     Vitals: Blood pressure 158/73, pulse 62, temperature 97 6 °F (36 4 °C), temperature source Oral, resp  rate 18, height 6' 5" (1 956 m), weight 79 2 kg (174 lb 9 7 oz), SpO2 98 %  ,Body mass index is 20 71 kg/m²    Wt Readings from Last 3 Encounters:   02/14/18 79 2 kg (174 lb 9 7 oz)   02/13/18 79 4 kg (175 lb)   02/06/18 79 8 kg (176 lb)     No intake or output data in the 24 hours ending 02/15/18 0611    Physical Exam: General appearance: alert and oriented, in no acute distress  Lungs: Decreased breath sounds and rhonchi present bilaterally,  left significantly greater than right  Heart: regular rate and rhythm, S1, S2 normal, no murmur, click, rub or gallop  Abdomen: soft, non-tender; bowel sounds normal; no masses,  no organomegaly  Extremities: extremities normal, warm and well-perfused; no cyanosis, clubbing, or edema     Recent Results (from the past 24 hour(s))   CBC and differential    Collection Time: 02/14/18  4:15 PM   Result Value Ref Range    WBC 7 70 4 80 - 10 80 Thousand/uL    RBC 4 22 (L) 4 70 - 6 10 Million/uL    Hemoglobin 13 7 (L) 14 0 - 18 0 g/dL    Hematocrit 40 2 (L) 42 0 - 52 0 %    MCV 95 82 - 98 fL    MCH 32 5 (H) 27 0 - 31 0 pg    MCHC 34 1 31 4 - 37 4 g/dL    RDW 14 3 11 6 - 15 1 %    MPV 5 9 (L) 8 9 - 12 7 fL    Platelets 046 627 - 003 Thousands/uL    nRBC 0 /100 WBCs    Neutrophils Relative 76 (H) 43 - 75 %    Lymphocytes Relative 16 14 - 44 %    Monocytes Relative 7 4 - 12 %    Eosinophils Relative 1 0 - 6 %    Basophils Relative 0 0 - 1 %    Neutrophils Absolute 5 80 1 85 - 7 62 Thousands/µL    Lymphocytes Absolute 1 20 0 60 - 4 47 Thousands/µL    Monocytes Absolute 0 60 0 17 - 1 22 Thousand/µL    Eosinophils Absolute 0 10 0 00 - 0 61 Thousand/µL    Basophils Absolute 0 00 0 00 - 0 10 Thousands/µL   Comprehensive metabolic panel    Collection Time: 02/14/18  4:15 PM   Result Value Ref Range    Sodium 141 136 - 145 mmol/L    Potassium 3 5 3 5 - 5 3 mmol/L    Chloride 103 100 - 108 mmol/L    CO2 26 21 - 32 mmol/L    Anion Gap 12 4 - 13 mmol/L    BUN 12 5 - 25 mg/dL    Creatinine 1 08 0 60 - 1 30 mg/dL    Glucose 104 65 - 140 mg/dL    Calcium 9 4 8 3 - 10 1 mg/dL    AST 22 5 - 45 U/L    ALT 38 12 - 78 U/L    Alkaline Phosphatase 56 46 - 116 U/L    Total Protein 7 1 6 4 - 8 2 g/dL    Albumin 3 9 3 5 - 5 0 g/dL    Total Bilirubin 1 20 (H) 0 20 - 1 00 mg/dL    eGFR 67 ml/min/1 73sq m   Troponin I    Collection Time: 02/14/18  4:15 PM   Result Value Ref Range    Troponin I <0 02 <=0 04 ng/mL   Protime-INR    Collection Time: 02/14/18  4:15 PM   Result Value Ref Range    Protime 11 1 9 4 - 11 7 seconds    INR 1 06 0 86 - 1 16   APTT    Collection Time: 02/14/18  4:15 PM   Result Value Ref Range    PTT 25 23 - 35 seconds       Current Facility-Administered Medications   Medication Dose Route Frequency Provider Last Rate Last Dose    amLODIPine (NORVASC) tablet 5 mg  5 mg Oral QAM John Jara MD        enoxaparin (LOVENOX) subcutaneous injection 80 mg  1 mg/kg Subcutaneous Q12H [address] John Jara MD   80 mg at 02/14/18 2121    fluticasone (FLONASE) 50 mcg/act nasal spray 1 spray  1 spray Each Nare Daily John Jara MD        fluticasone-salmeterol (ADVAIR) 250-50 mcg/dose inhaler 1 puff  1 puff Inhalation Q12H [address] John Jara MD   1 puff at 02/14/18 2121    ipratropium-albuterol (DUO-NEB) 0 5-2 5 mg/3 mL inhalation solution 3 mL  3 mL Nebulization Q6H John Jara MD   3 mL at 02/15/18 0228    meclizine (ANTIVERT) tablet 12 5 mg  12 5 mg Oral Daily John Jara MD        pantoprazole (PROTONIX) EC tablet 40 mg  40 mg Oral Early Morning John Jara MD   40 mg at 02/15/18 0547    tamsulosin (FLOMAX) capsule 0 4 mg  0 4 mg Oral HS John Jara MD   0 4 mg at 02/14/18 2121       Invasive Devices     Peripheral Intravenous Line            Peripheral IV 02/14/18 Left Antecubital less than 1 day                Lab, Imaging and other studies: I have personally reviewed pertinent reports      VTE Pharmacologic Prophylaxis: Enoxaparin (Lovenox)  VTE Mechanical Prophylaxis: sequential compression device    Nell Sahu DO

## 2018-02-15 NOTE — PLAN OF CARE
CARDIOVASCULAR - ADULT     Maintains optimal cardiac output and hemodynamic stability Progressing     Absence of cardiac dysrhythmias or at baseline rhythm Progressing        DISCHARGE PLANNING     Discharge to home or other facility with appropriate resources Progressing        INFECTION - ADULT     Absence or prevention of progression during hospitalization Progressing     Absence of fever/infection during neutropenic period Progressing        Knowledge Deficit     Patient/family/caregiver demonstrates understanding of disease process, treatment plan, medications, and discharge instructions Progressing        PAIN - ADULT     Verbalizes/displays adequate comfort level or baseline comfort level Progressing        Potential for Falls     Patient will remain free of falls Progressing        RESPIRATORY - ADULT     Achieves optimal ventilation and oxygenation Progressing        SAFETY ADULT     Maintain or return to baseline ADL function Progressing     Maintain or return mobility status to optimal level Progressing        SKIN/TISSUE INTEGRITY - ADULT     Skin integrity remains intact Progressing

## 2018-02-16 ENCOUNTER — APPOINTMENT (OUTPATIENT)
Dept: PULMONOLOGY | Facility: CLINIC | Age: 73
End: 2018-02-16
Payer: MEDICARE

## 2018-02-16 LAB
ANION GAP SERPL CALCULATED.3IONS-SCNC: 4 MMOL/L (ref 4–13)
BUN SERPL-MCNC: 19 MG/DL (ref 5–25)
CALCIUM SERPL-MCNC: 9 MG/DL (ref 8.3–10.1)
CHLORIDE SERPL-SCNC: 105 MMOL/L (ref 100–108)
CO2 SERPL-SCNC: 30 MMOL/L (ref 21–32)
CREAT SERPL-MCNC: 1.09 MG/DL (ref 0.6–1.3)
ERYTHROCYTE [DISTWIDTH] IN BLOOD BY AUTOMATED COUNT: 14.3 % (ref 11.6–15.1)
GFR SERPL CREATININE-BSD FRML MDRD: 67 ML/MIN/1.73SQ M
GLUCOSE SERPL-MCNC: 108 MG/DL (ref 65–140)
HCT VFR BLD AUTO: 35.4 % (ref 42–52)
HGB BLD-MCNC: 11.9 G/DL (ref 14–18)
MAGNESIUM SERPL-MCNC: 1.8 MG/DL (ref 1.6–2.6)
MCH RBC QN AUTO: 32.4 PG (ref 27–31)
MCHC RBC AUTO-ENTMCNC: 33.6 G/DL (ref 31.4–37.4)
MCV RBC AUTO: 96 FL (ref 82–98)
MRSA NOSE QL CULT: NORMAL
PHOSPHATE SERPL-MCNC: 3.8 MG/DL (ref 2.3–4.1)
PLATELET # BLD AUTO: 174 THOUSANDS/UL (ref 130–400)
PMV BLD AUTO: 6 FL (ref 8.9–12.7)
POTASSIUM SERPL-SCNC: 3.7 MMOL/L (ref 3.5–5.3)
RBC # BLD AUTO: 3.67 MILLION/UL (ref 4.7–6.1)
SODIUM SERPL-SCNC: 139 MMOL/L (ref 136–145)
WBC # BLD AUTO: 8.7 THOUSAND/UL (ref 4.8–10.8)

## 2018-02-16 PROCEDURE — 94760 N-INVAS EAR/PLS OXIMETRY 1: CPT | Performed by: INTERNAL MEDICINE

## 2018-02-16 PROCEDURE — 80048 BASIC METABOLIC PNL TOTAL CA: CPT | Performed by: STUDENT IN AN ORGANIZED HEALTH CARE EDUCATION/TRAINING PROGRAM

## 2018-02-16 PROCEDURE — 99255 IP/OBS CONSLTJ NEW/EST HI 80: CPT | Performed by: INTERNAL MEDICINE

## 2018-02-16 PROCEDURE — 84100 ASSAY OF PHOSPHORUS: CPT | Performed by: STUDENT IN AN ORGANIZED HEALTH CARE EDUCATION/TRAINING PROGRAM

## 2018-02-16 PROCEDURE — 94640 AIRWAY INHALATION TREATMENT: CPT

## 2018-02-16 PROCEDURE — 85027 COMPLETE CBC AUTOMATED: CPT | Performed by: STUDENT IN AN ORGANIZED HEALTH CARE EDUCATION/TRAINING PROGRAM

## 2018-02-16 PROCEDURE — 83735 ASSAY OF MAGNESIUM: CPT | Performed by: STUDENT IN AN ORGANIZED HEALTH CARE EDUCATION/TRAINING PROGRAM

## 2018-02-16 PROCEDURE — 99232 SBSQ HOSP IP/OBS MODERATE 35: CPT | Performed by: INTERNAL MEDICINE

## 2018-02-16 PROCEDURE — 94760 N-INVAS EAR/PLS OXIMETRY 1: CPT

## 2018-02-16 PROCEDURE — 93970 EXTREMITY STUDY: CPT | Performed by: SURGERY

## 2018-02-16 RX ORDER — ZOLPIDEM TARTRATE 5 MG/1
10 TABLET ORAL
Status: DISCONTINUED | OUTPATIENT
Start: 2018-02-16 | End: 2018-02-18 | Stop reason: HOSPADM

## 2018-02-16 RX ORDER — ZOLPIDEM TARTRATE 6.25 MG/1
10 TABLET, FILM COATED, EXTENDED RELEASE ORAL
COMMUNITY
End: 2018-10-17 | Stop reason: HOSPADM

## 2018-02-16 RX ADMIN — IPRATROPIUM BROMIDE AND ALBUTEROL SULFATE 3 ML: .5; 3 SOLUTION RESPIRATORY (INHALATION) at 14:09

## 2018-02-16 RX ADMIN — TAMSULOSIN HYDROCHLORIDE 0.4 MG: 0.4 CAPSULE ORAL at 23:08

## 2018-02-16 RX ADMIN — FLUTICASONE PROPIONATE AND SALMETEROL 1 PUFF: 50; 250 POWDER RESPIRATORY (INHALATION) at 08:34

## 2018-02-16 RX ADMIN — MECLIZINE HCL 12.5 MG: 12.5 TABLET ORAL at 08:34

## 2018-02-16 RX ADMIN — PANTOPRAZOLE SODIUM 40 MG: 40 TABLET, DELAYED RELEASE ORAL at 05:33

## 2018-02-16 RX ADMIN — ENOXAPARIN SODIUM 80 MG: 80 INJECTION SUBCUTANEOUS at 20:24

## 2018-02-16 RX ADMIN — ZOLPIDEM TARTRATE 10 MG: 5 TABLET ORAL at 23:08

## 2018-02-16 RX ADMIN — FLUTICASONE PROPIONATE 1 SPRAY: 50 SPRAY, METERED NASAL at 08:34

## 2018-02-16 RX ADMIN — FLUTICASONE PROPIONATE AND SALMETEROL 1 PUFF: 50; 250 POWDER RESPIRATORY (INHALATION) at 20:24

## 2018-02-16 RX ADMIN — IPRATROPIUM BROMIDE AND ALBUTEROL SULFATE 3 ML: .5; 3 SOLUTION RESPIRATORY (INHALATION) at 20:49

## 2018-02-16 RX ADMIN — AMLODIPINE BESYLATE 5 MG: 5 TABLET ORAL at 08:34

## 2018-02-16 RX ADMIN — ENOXAPARIN SODIUM 80 MG: 80 INJECTION SUBCUTANEOUS at 08:34

## 2018-02-16 RX ADMIN — IPRATROPIUM BROMIDE AND ALBUTEROL SULFATE 3 ML: .5; 3 SOLUTION RESPIRATORY (INHALATION) at 09:50

## 2018-02-16 NOTE — CONSULTS
Hematology Oncology Consult Report    Lauren [place], 1945, [id number]  2 [facility] 210/2 [address] [address]    Impression and plan:    3 69-year-old male with chest pain  Workup demonstrated an acute pulmonary embolus in the distal left main pulmonary artery and proximal left lower lobe segmental branches  Patient is on Lovenox, chest pain is less/better  Patient's wife is bringing in the list from the insurance company of what medications will be allowed for anticoagulation  Obviously the thrombin inhibitors are more convenient and then warfarin  Respiratory monitoring is ongoing  Pain is controlled  2  Pulmonary nodules, emphysema  Patient will follow up with pulmonary as an outpatient status post discharge  The recent PET/CT demonstrated a 1 2 x 0 8 cm right upper lung nodule with an SUV of 3 4 concerning for malignancy  Not sure if this lesion has been biopsied but it is concerning for malignancy  Possibly patient would be a candidate for stereotactic radiosurgery (I doubt patient would be a candidate for surgery because of his pulmonary status)  Will discuss with pulmonary  3  Alpha-1 antitrypsin deficiency increases patient's risk for lung cancer  This risk is increased further and smokers and nonsmoking females  The risk for hepatocellular carcinoma and is also increased  The recent PET/CT did not  any abnormalities within the liver  Alpha-fetoprotein level has been requested  4  Questionable DVT in the past; recent PE with out evidence of DVT (official results from the February 15, 2018 Doppler results on the lower extremities is still not complete  When Mr Delgado Pérez is better/stable and discharge, patient will be brought back to the office to discuss a thrombophilia evaluation  No family history of an inherited thrombophilia  The above was discussed with Mr Delgado Pérez and his son; all questions were answered  Will follow with you   Please do not hesitate to contact the Hematology service if you have any questions or concerns  Thank you for this consult   ___________________________________________________________________________________________________________________________________________________________________    Chief complaint:  Shortness of breath, chest pain    History of present illness: This is a 79-year-old male admitted with the above  Mr Екатерина Marie states having chest pain for a few weeks prior to admission  Patient states that the chest pain progressed  Patient also had trouble breathing  No cough, sputum or hemoptysis  Patient states that his appetite has been decreased recently but weight is stable  No fevers, chills or sweats  No headaches, blurred vision, dizziness or syncopal episodes  Patient denies any GI or  issues  Mr Екатерина Marie follows with pulmonary because of pulmonary nodules  Presently patient states feeling better  Chest pain is less/better  Patient can take deeper breaths  Activities have improved  Patient is on Lovenox, no issues with the injection  Patient denies any problems with excessive bruising/bleeding  Patient believes he had a DVT many years ago but does not remember the specifics  No recent precipitating factors or trauma to the lower extremities  Past medical history:   Past Medical History:   Diagnosis Date    Anesthesia complication     Difficult to wake up    Arthritis     BPH (benign prostatic hyperplasia)     urinary frequency    Cancer (HCC)     basal cell neck, face    COPD (chronic obstructive pulmonary disease) (HCC)     Full dentures     Hypertension     controlled    Kidney stone     at least 7 episodes    Liver disease     Alpha 1- enzyme deficiency - diagnosed 2002   has been on weekly replacement therapy since then    Pulmonary emphysema ([facility] [address] )     1/25/15  FEV1 - 2 45 liters or 59% of predicted    Wears glasses     for driving only     Past surgical history:   Past Surgical History:   Procedure Laterality Date    BACK SURGERY  2008 discectomy    COLONOSCOPY      COLONOSCOPY N/A 3/10/2017    Procedure: Bibiana Scott;  Surgeon: Flores Lindsay MD;  Location: [place] [address] GI LAB; Service:    Roxana Fide      removal of kidney stones    ESOPHAGOGASTRODUODENOSCOPY N/A 3/10/2017    Procedure: ESOPHAGOGASTRODUODENOSCOPY (EGD); Surgeon: Flores Lindsay MD;  Location: [facility] GI LAB; Service:     LITHOTRIPSY      TONSILLECTOMY      VEIN LIGATION AND STRIPPING Bilateral     1980's     Allergies:    Allergies   Allergen Reactions    Chantix [Varenicline]      Caused prostate infection     Home medications:   Prescriptions Prior to Admission   Medication    albuterol (PROVENTIL HFA,VENTOLIN HFA) 90 mcg/act inhaler    Alpha1-Proteinase Inhibitor (ZEMAIRA IV)    amLODIPine (NORVASC) 5 mg tablet    fluticasone (FLONASE) 50 mcg/act nasal spray    fluticasone-salmeterol (ADVAIR DISKUS) 250-50 mcg/dose inhaler    meclizine (ANTIVERT) 12 5 MG tablet    omeprazole (PriLOSEC) 40 MG capsule    tamsulosin (FLOMAX) 0 4 mg    ZEMAIRA injection    albuterol (2 5 mg/3 mL) 0 083 % nebulizer solution     Hospital medications:   Current Facility-Administered Medications:     amLODIPine (NORVASC) tablet 5 mg, 5 mg, Oral, QAM, Rosaura Delatorre MD, 5 mg at 02/16/18 0834    enoxaparin (LOVENOX) subcutaneous injection 80 mg, 1 mg/kg, Subcutaneous, Q12H [facility], Rosaura Delatorre MD, 80 mg at 02/16/18 0834    fluticasone (FLONASE) 50 mcg/act nasal spray 1 spray, 1 spray, Each Nare, Daily, Rosaura Delatorre MD, 1 spray at 02/16/18 0834    fluticasone-salmeterol (ADVAIR) 250-50 mcg/dose inhaler 1 puff, 1 puff, Inhalation, Q12H [facility], Rosaura Delatorre MD, 1 puff at 02/16/18 0834    ipratropium-albuterol (DUO-NEB) 0 5-2 5 mg/3 mL inhalation solution 3 mL, 3 mL, Nebulization, Q6H, Rosaura Delatorre MD, 3 mL at 02/16/18 0950    meclizine (ANTIVERT) tablet 12 5 mg, 12 5 mg, Oral, Daily, Rosaura Delatorre MD, 12 5 mg at 02/16/18 0834    melatonin tablet 3 mg, 3 mg, Oral, , Dennie Ka Elizabeth Cervantes MD, 3 mg at 02/15/18 2213    pantoprazole (PROTONIX) EC tablet 40 mg, 40 mg, Oral, Early Morning, Sharron Jimenez MD, 40 mg at 02/16/18 0533    tamsulosin [facility]) capsule 0 4 mg, 0 4 mg, Oral, HS, Sharron Jimenez MD, 0 4 mg at 02/15/18 2212    Social history: one pack per day for 60 years discontinued approximate 6 months ago, no drug or alcohol abuse, no toxic exposure    Family history:   Family History   Problem Relation Age of Onset    Emphysema Mother      never smoked    Emphysema Father     Cancer Brother      colon     Review of systems: No blurred vision or double vision, no tinnitus or trouble hearing, no headaches, dizziness or body aches, + chest pain, +  pressure, + shortness of breath and dyspnea on exertion, no cough, sputum or hemoptysis, no nausea, vomiting, diarrhea or constipation, no abdominal pain, no blood in the stools, no weight loss, no urinary incontinence, frequency or dribbling, no hematuria, no yellowing of the skin, no problems with excessive bruising or bleeding from the skin, no numbness, tingling, seizures or syncopal episodes    Physical exam  Vitals:    02/16/18 1135   BP: 138/64   Pulse: 71   Resp: 16   Temp: 98 °F (36 7 °C)   SpO2: 98%   Constitutional:  Well-nourished male, mild respiratory distress  Eyes:  PERRL, conjunctiva pink, anicteric   HENT:  Atraumatic, external ears normal, nose normal,   Oropharynx: moist, no pharyngeal exudates, no thrush, pink  Neck: No adenopathy, good range of motion  Respiratory: decreased breath sounds bilaterally, bilateral rhonchi  Cardiovascular:  Normal rate, normal rhythm, no murmurs, no gallops, no rubs   GI:  Soft, nontender, no hepatosplenomegaly, no rigidity or rebound  :  No costovertebral angle tenderness, normal reproductive organs, no discharge  Musculoskeletal: no pain or tenderness with palpation of joints, muscles or bones  Integument: relatively good color, scattered ecchymoses, no petechiae, no active bleeding  Lymphatic:  No lymphadenopathy in the neck, supraclavicular region, infraclavicular region, axilla and groin bilaterally  Extremities:  No edema, no cords, pulses are 1+  Neurologic:  Alert & oriented x 3, CN 2-12 normal, normal motor function, normal sensory function, no focal deficits noted  Psychiatric:  Speech and behavior appropriate, response time appropriate  Rectal: Deferred    Laboratory test results    Results for Shoshana Patel (MRN [id number]) as of 2/16/2018 12:46   Ref  Range 2/16/2018 05:32   WBC Latest Ref Range: 4 80 - 10 80 Thousand/uL 8 70   RBC Latest Ref Range: 4 70 - 6 10 Million/uL 3 67 (L)   Hemoglobin Latest Ref Range: 14 0 - 18 0 g/dL 11 9 (L)   Hematocrit Latest Ref Range: 42 0 - 52 0 % 35 4 (L)   MCV Latest Ref Range: 82 - 98 fL 96   MCH Latest Ref Range: 27 0 - 31 0 pg 32 4 (H)   MCHC Latest Ref Range: 31 4 - 37 4 g/dL 33 6   RDW Latest Ref Range: 11 6 - 15 1 % 14 3   Platelets Latest Ref Range: 130 - 400 Thousands/uL 174   MPV Latest Ref Range: 8 9 - 12 7 fL 6 0 (L)     Results for Shoshana Patel (MRN [id number]) as of 2/16/2018 12:46   Ref   Range 2/15/2018 05:53   Sodium Latest Ref Range: 136 - 145 mmol/L 139   Potassium Latest Ref Range: 3 5 - 5 3 mmol/L 4 0   Chloride Latest Ref Range: 100 - 108 mmol/L 103   CO2 Latest Ref Range: 21 - 32 mmol/L 26   Anion Gap Latest Ref Range: 4 - 13 mmol/L 10   BUN Latest Ref Range: 5 - 25 mg/dL 13   Creatinine Latest Ref Range: 0 60 - 1 30 mg/dL 1 12   Glucose Latest Ref Range: 65 - 140 mg/dL 141 (H)   Calcium Latest Ref Range: 8 3 - 10 1 mg/dL 9 1   AST Latest Ref Range: 5 - 45 U/L 15   ALT Latest Ref Range: 12 - 78 U/L 32   Alkaline Phosphatase Latest Ref Range: 46 - 116 U/L 45 (L)   Total Protein Latest Ref Range: 6 4 - 8 2 g/dL 6 5   Albumin Latest Ref Range: 3 5 - 5 0 g/dL 3 3 (L)   Total Bilirubin Latest Ref Range: 0 20 - 1 00 mg/dL 1 00   eGFR Latest Units: ml/min/1 73sq m 64   Phosphorus Latest Ref Range: 2 3 - 4 1 mg/dL 2 8 Magnesium Latest Ref Range: 1 6 - 2 6 mg/dL 1 7     Results for Jyoti Moore (MRN [id number]) as of 2/16/2018 12:46   Ref  Range 2/14/2018 16:15   Protime Latest Ref Range: 9 4 - 11 7 seconds 11 1   INR Latest Ref Range: 0 86 - 1 16  1 06   PTT Latest Ref Range: 23 - 35 seconds 25     Results for Jyoti Moore (MRN [id number]) as of 2/16/2018 12:46   Ref  Range 9/27/2016 13:48   PROSTATE SPECIFIC ANTIGEN Latest Ref Range: 0 0 - 4 0 ng/mL 1 1       Radiology report    2/14/18 CTA chest PE study    1  Acute pulmonary embolus in the distal left main pulmonary artery and proximal left lower lobe segmental branches  2   Moderate panlobular emphysema  3   Stable right upper and left lower pulmonary nodules measuring 1 2 x 1 3 cm  PET/CT was performed, based on current Fleischner Society 2017 Guidelines on incidental pulmonary nodule  Tissue sampling of the right upper lobe hypermetabolic nodule is   planned  3 month follow-up of left lower lobe pulmonary nodule as previously recommended      2/12/18 PET/CT    1   1 2 x 0 8 cm right upper lung nodule demonstrates hypermetabolism and is most concerning for malignancy  Tissue sampling recommended  2   1 3 x 0 6 cm left lower lung nodule does not demonstrate significant hypermetabolism  In the absence of tissue sampling, 3 month CT follow-up is recommended to exclude a low-grade neoplasm  3   No hypermetabolic metastases visualized

## 2018-02-16 NOTE — PROGRESS NOTES
[facility] Progress Note - Rox Torres 75 y o  male MRN: [id number]    Unit/Bed#: 2 [place] [address] Encounter: [id number]      Assessment/Plan:    Shortness of breath likely secondary to pulmonary embolism  - CTA chest:  Acute PE in distal left main pulmonary artery and proximal left lower lobe segmental branches   Stable right upper and left lower pulmonary nodules  - Chest x-ray:  COPD, right upper lobe nodule density, no acute infiltrates  - Troponin negative x1, EKG within normal limits  - Continue therapeutic Lovenox 1 mg/kg bid  -  Pt on 3L O2 via NC as needed   - Venous duplex b/l LE study pending   - PT/OT ordered        Neoplasm of uncertain behavior of trachea, bronchus, and lung  - Follows with Dr Mansi Che  - PET CT:  Hypermetabolic nodules  - CTA chest - right upper and left lower lobes measuring 1 2 x 1 3 centimeter   Tissue sampling of right upper lobe nodule is planned   Three-month follow-up of left lower lobe nodule as previously recommended  - Pulmonology recommendations appreciated      Alpha 1 antitrypsin deficiency  - Continue with weekly Zemaira on Tuesdays  - Alpha-fetoprotein level ordered   - Pulmonology consult appreciated   - Heme/Onc Dr Katia Alfonso consulted, recommendations highly appreciated      COPD   - Continue with Advair 1 puff Q12H and Duonebs Q6H  - Will continue to hold home medication Spiriva     Hypertension  - stable  - Latest /70   - Continue with amlodipine 5 mg daily     GERD  - Stable  - Continue Protonix 40 mg PO Daily     BPH  - Stable  - Continue with Flomax 0 4 mg PO qhs       Insomnia  - Started on home dose Ambien 10mg PO Daily at bedtime PRN      Global  - Regular diet  - replete electrolytes as necessary  - Lovenox and SCDs      Subjective:   Pt seen and examined at bedside  Pt states that he continues to experience shortness of breath had when he walks to the restroom and back  Patient is comfortable when he is lying in bed    Denies fever, chills, chest pain, abdominal pain and urinary changes  Objective:     Vitals: Blood pressure 119/64, pulse 76, temperature 98 7 °F (37 1 °C), temperature source Tympanic, resp  rate 18, height 6' 5" (1 956 m), weight 79 2 kg (174 lb 9 7 oz), SpO2 95 %  ,Body mass index is 20 71 kg/m²  Wt Readings from Last 3 Encounters:   02/14/18 79 2 kg (174 lb 9 7 oz)   02/13/18 79 4 kg (175 lb)   02/06/18 79 8 kg (176 lb)     No intake or output data in the 24 hours ending 02/16/18 0640    Physical Exam: General appearance: alert and oriented, in no acute distress  Lungs: diminished breath sounds bilaterally L>R  Heart: regular rate and rhythm, S1, S2 normal, no murmur, click, rub or gallop  Abdomen: soft, non-tender; bowel sounds normal; no masses,  no organomegaly  Extremities: extremities normal, warm and well-perfused; no cyanosis, clubbing, or edema     Recent Results (from the past 24 hour(s))   Clostridium difficile toxin by PCR    Collection Time: 02/15/18  7:06 AM   Result Value Ref Range    C difficile toxin by PCR NEGATIVE for C difficle toxin by PCR  NEGATIVE for C difficle toxin by PCR      CBC    Collection Time: 02/16/18  5:32 AM   Result Value Ref Range    WBC 8 70 4 80 - 10 80 Thousand/uL    RBC 3 67 (L) 4 70 - 6 10 Million/uL    Hemoglobin 11 9 (L) 14 0 - 18 0 g/dL    Hematocrit 35 4 (L) 42 0 - 52 0 %    MCV 96 82 - 98 fL    MCH 32 4 (H) 27 0 - 31 0 pg    MCHC 33 6 31 4 - 37 4 g/dL    RDW 14 3 11 6 - 15 1 %    Platelets 600 156 - 447 Thousands/uL    MPV 6 0 (L) 8 9 - 12 7 fL   Basic metabolic panel    Collection Time: 02/16/18  5:32 AM   Result Value Ref Range    Sodium 139 136 - 145 mmol/L    Potassium 3 7 3 5 - 5 3 mmol/L    Chloride 105 100 - 108 mmol/L    CO2 30 21 - 32 mmol/L    Anion Gap 4 4 - 13 mmol/L    BUN 19 5 - 25 mg/dL    Creatinine 1 09 0 60 - 1 30 mg/dL    Glucose 108 65 - 140 mg/dL    Calcium 9 0 8 3 - 10 1 mg/dL    eGFR 67 ml/min/1 73sq m   Magnesium    Collection Time: 02/16/18  5:32 AM Result Value Ref Range    Magnesium 1 8 1 6 - 2 6 mg/dL   Phosphorus    Collection Time: 02/16/18  5:32 AM   Result Value Ref Range    Phosphorus 3 8 2 3 - 4 1 mg/dL       Current Facility-Administered Medications   Medication Dose Route Frequency Provider Last Rate Last Dose    amLODIPine (NORVASC) tablet 5 mg  5 mg Oral QAM Mk Valenzuela MD   5 mg at 02/15/18 1033    enoxaparin (LOVENOX) subcutaneous injection 80 mg  1 mg/kg Subcutaneous Q12H [address] Mk Valenzuela MD   80 mg at 02/15/18 2213    fluticasone (FLONASE) 50 mcg/act nasal spray 1 spray  1 spray Each Nare Daily Mk Valenzuela MD   1 spray at 02/15/18 1101    fluticasone-salmeterol (ADVAIR) 250-50 mcg/dose inhaler 1 puff  1 puff Inhalation Q12H [address] Mk Valenzuela MD   1 puff at 02/15/18 2159    ipratropium-albuterol (DUO-NEB) 0 5-2 5 mg/3 mL inhalation solution 3 mL  3 mL Nebulization Q6H Mk Valenzuela MD   3 mL at 02/15/18 2319    meclizine (ANTIVERT) tablet 12 5 mg  12 5 mg Oral Daily Mk Valenzuela MD   12 5 mg at 02/15/18 1033    melatonin tablet 3 mg  3 mg Oral HS Mk Valenzuela MD   3 mg at 02/15/18 2213    pantoprazole (PROTONIX) EC tablet 40 mg  40 mg Oral Early Morning Mk Valenzuela MD   40 mg at 02/16/18 0533    tamsulosin (FLOMAX) capsule 0 4 mg  0 4 mg Oral HS Mk Valenzuela MD   0 4 mg at 02/15/18 2212       Invasive Devices     Peripheral Intravenous Line            Peripheral IV 02/14/18 Left Antecubital 1 day                Lab, Imaging and other studies: I have personally reviewed pertinent reports      VTE Pharmacologic Prophylaxis: Enoxaparin (Lovenox)  VTE Mechanical Prophylaxis: sequential compression device    Nell Sahu DO

## 2018-02-16 NOTE — SOCIAL WORK
DASH discussion completed  Discussed goals of making sure pt's  needs are met upon discharge, pt's preferences are taken into account, pt understands health condition, medications and symptoms to watch for after returning home and pt is aware of any follow up appointments recommended by hospital physician  PT LIVES ALONE INDEPENDENTLY, HAS HOME O2 VIA [employer], HAS HAD [facility] IN THE PAST, USES [facility] FOR RX NEEDS  CM WILL FOLLOW FOR D/C NEEDS

## 2018-02-16 NOTE — CONSULTS
Consultation - Pulmonary Medicine   Rox Torres 75 y o  male MRN: [id number]  Unit/Bed#: 2 [place] [address] Encounter: [id number]      Assessment:  Acute pulmonary emboli distal left mainstem extending to branches of the left lower lobe pulmonary artery  Provoking factor may been his recent inactivity due to recent COPD exacerbation and RSV infection  Bilateral small lung nodules  There is a 1 2 x 0 8 cm right upper lobe lung nodule with mild mild hypermetabolic activity with SUV of 3 4  This possibly could be a neoplasm but also may be an inflammatory lesion due to recent RSV infection  There is another 1 3 cm x 0 6 cm left lower lobe lung nodule without any hypermetabolism  Patient states he has had CT scans of his chest that never showed any type of lung nodule  Moderate to severe centrilobular emphysema  Patient has known alpha-1 antitrypsin deficiency diagnosed 2002 and has been on IV weekly 80 replacement therapy since then  Recent FEV1 was 2 14 liters or 55 percent of predicted as per spirometry done 01/31/2018  Substernal chest discomfort which could be due to GERD    Plan:   I concur with Lovenox 1 milligram/kilogram subcu every 12 hours  Depending on my discussion with Oncology then consideration for further treatment when discharge would be either to continue Lovenox at present dose, consideration for agent such as Eliquis or Xarelto, or warfarin therapy  Recent PET-CT scan does not show any evidence of any advanced cancer so I suspect his venous thromboembolism is more due to his inactivity from recent RSV respiratory infection and COPD exacerbation  I will check with IR physician is see if they think the right upper lobe lung nodule would be ameniable to CT-guided needle biopsy    This lesion is on the verge of being too small for this procedure    Continue neb treatments with DuoNeb every 6 hours and Advair 250 mcg 1 puff every 12 hours    Continue Protonix    I did review the CT images of the chest with the patient  History of Present Illness   Physician Requesting Consult: Simone Sage DO  Reason for Consult / Principal Problem:, lung nodule Pulmonary embolism  Hx and PE limited by: none    HPI: Mario Alberto Lockett is a 75y o  year old male who presents with complaints of severe shortness of breath  Patient has been having shortness of breath particular with activity over the past 2 weeks  He had gone to his 1st full sessionion of pulmonary rehab today of [address]  There he became very short of breath with minimal exercise does came to the ER  CTA of the chest was done on 2/14 this revealed thrombus in the distal left main pulmonary artery and proximal left lower lobe segmental branches  There is moderate panlobular and bullous emphysema  Is also stable 1 2 cm right upper lobe lung nodule and 1 3 cm left lower lobe lung nodule compared to prior lung cancer screening CT scan of chest on 01/24/2018  The patient has been started on therapeutic Lovenox at 1 milligram/kilogram subcu every 12 hours  He denies any hemoptysis or pleuritic chest pain  His initial white blood cell count was 7 7 with hemoglobin of 13 7  Serum creatinine is 1  12  Venous Doppler studies were done of his lower extremities but results are pending  Patient denies any leg swelling or calf pain  He did have a PET-CT scan done on February 12, 2018  This showed a 1 2 x 0 8 cm right upper lobe lung nodule to have mild hypermetabolic activity with SUV of 3 4  This is located in the posterior apical region of his lung  He also has a 1 3 x 0 6 cm left lower lobe lung nodule with no significant hypermetabolic activity  There is no significant metabolic activity seen elsewhere  This patient has a history of alpha 1 antitrypsin deficiency phenotype PiSZ  He was diagnosed in 2002 and has been on IV AAT infusion weekly since then    He quit smoking in August 2017 and has a history smoking about 1 pack of cigarettes per day for 60 years  Spirometry done January 25, 2017 showed moderate airflow obstruction with an FEV1 of 2 45 L or 59% of predicted  Patient had been admitted to the hospital in early January of this year for acute exacerbation of COPD secondary to RSV infection  Since that infection hospitalization he has had some persistent exertional dyspnea  He had been using 3 L of oxygen use in our office January 31, 2018  Spirometry done in our office on January 31, 2018 showed an FEV1 of 2 14 L or 55% of predicted, FVC was 4 94 L or 94% of predicted obstructive index severely decreased at 43%  This consistent with moderate to severe airflow obstruction  He does comparing complain of this burning type sensation throughout his sternal area  This is constant and nonradiating  He does have a history of GERD        Review of Systems   Constitutional: Negative for unexpected weight change  Complains of persistent shortness of breath over last few weeks and became suddenly worse  No pleuritic chest pain  No fever or chills  HENT: Negative for rhinorrhea and sore throat  Eyes: Negative for redness and itching  Respiratory: Positive for shortness of breath  Negative for cough  Cardiovascular: Negative for leg swelling  Gastrointestinal: Negative for abdominal pain and nausea  Endocrine: Negative for polydipsia and polyphagia  Genitourinary: Negative for dysuria  Musculoskeletal: Negative for joint swelling and myalgias  Skin: Negative for rash  Neurological: Negative for dizziness, tremors and syncope  Psychiatric/Behavioral: Negative for confusion         Historical Information   Past Medical History:   Diagnosis Date    Anesthesia complication     Difficult to wake up    Arthritis     BPH (benign prostatic hyperplasia)     urinary frequency    Cancer (HCC)     basal cell neck, face    COPD (chronic obstructive pulmonary disease) (HCC)     Full dentures     Hypertension     controlled  Kidney stone     at least 7 episodes    Liver disease     Alpha 1- enzyme deficiency - diagnosed 2002  has been on weekly replacement therapy since then    Pulmonary emphysema ([address] )     1/25/15  FEV1 - 2 45 liters or 59% of predicted    Wears glasses     for driving only     Past Surgical History:   Procedure Laterality Date    BACK SURGERY  2008    discectomy    COLONOSCOPY      COLONOSCOPY N/A 3/10/2017    Procedure: Miley Gotmarianne;  Surgeon: Luli Roper MD;  Location: [facility]; Service:    Terry Velasco      removal of kidney stones    ESOPHAGOGASTRODUODENOSCOPY N/A 3/10/2017    Procedure: ESOPHAGOGASTRODUODENOSCOPY (EGD); Surgeon: Luli Roper MD;  Location: [facility] GI LAB;   Service:     LITHOTRIPSY      TONSILLECTOMY      VEIN LIGATION AND STRIPPING Bilateral     1980's     Social History   History   Alcohol Use No     History   Drug Use No     History   Smoking Status    Former Smoker    Packs/day: 1 00    Years: 60 00    Quit date: 8/31/2017   Smokeless Tobacco    Never Used     Comment: quit in august 2017         Family History:   Family History   Problem Relation Age of Onset    Emphysema Mother      never smoked    Emphysema Father     Cancer Brother      colon       Meds/Allergies     Current Facility-Administered Medications:     amLODIPine (NORVASC) tablet 5 mg, 5 mg, Oral, QAM, Margaretha Dandy, MD, 5 mg at 02/15/18 1033    enoxaparin (LOVENOX) subcutaneous injection 80 mg, 1 mg/kg, Subcutaneous, Q12H [address], Margaretha Dandy, MD, 80 mg at 02/15/18 1033    fluticasone (FLONASE) 50 mcg/act nasal spray 1 spray, 1 spray, Each Nare, Daily, Margaretha Dandy, MD, 1 spray at 02/15/18 1101    fluticasone-salmeterol (ADVAIR) 250-50 mcg/dose inhaler 1 puff, 1 puff, Inhalation, Q12H [address], Margaretha Dandy, MD, 1 puff at 02/15/18 1101    ipratropium-albuterol (DUO-NEB) 0 5-2 5 mg/3 mL inhalation solution 3 mL, 3 mL, Nebulization, Q6H, Margaretha Dandy, MD, 3 mL at 02/15/18 1914    meclizine (ANTIVERT) tablet 12 5 mg, 12 5 mg, Oral, Daily, Mk Valenzuela MD, 12 5 mg at 02/15/18 1033    melatonin tablet 3 mg, 3 mg, Oral, HS, Mk Valenzuela MD    pantoprazole (PROTONIX) EC tablet 40 mg, 40 mg, Oral, Early Morning, Mk Valenzuela MD, 40 mg at 02/15/18 0547    tamsulosin (FLOMAX) capsule 0 4 mg, 0 4 mg, Oral, HS, Mk Valenzuela MD, 0 4 mg at 02/14/18 6601    Prior to Admission medications    Medication Sig Start Date End Date Taking? Authorizing Provider   albuterol (PROVENTIL HFA,VENTOLIN HFA) 90 mcg/act inhaler Inhale 2 puffs every 6 (six) hours as needed for wheezing 1/31/18  Yes Magdalena Brewer MD   Alpha1-Proteinase Inhibitor (ZEMAIRA IV) Infuse into a venous catheter once a week On tuesday -1000mg /50 ml    Yes Historical Provider, MD   amLODIPine (NORVASC) 5 mg tablet Take 1 tablet (5 mg total) by mouth every morning 2/2/18  Yes Abi Salgado MD   fluticasone Stann Ridgel) 50 mcg/act nasal spray  11/22/17  Yes Historical Provider, MD   fluticasone-salmeterol (ADVAIR DISKUS) 250-50 mcg/dose inhaler Inhale   Yes Historical Provider, MD   meclizine (ANTIVERT) 12 5 MG tablet Take 1 tablet by mouth daily 4/25/17  Yes Historical Provider, MD   omeprazole (PriLOSEC) 40 MG capsule  12/21/17  Yes Historical Provider, MD   tamsulosin (FLOMAX) 0 4 mg Take 0 4 mg by mouth daily at bedtime   Yes Historical Provider, MD   [facility] injection  12/27/17  Yes Historical Provider, MD   albuterol (2 5 mg/3 mL) 0 083 % nebulizer solution Inhale 1 each every 4 (four) hours as needed 4/25/17   Historical Provider, MD       Allergies   Allergen Reactions    Chantix [Varenicline]      Caused prostate infection       Objective   Vitals: Blood pressure 140/67, pulse 77, temperature 97 5 °F (36 4 °C), temperature source Tympanic, resp  rate 18, height 6' 5" (1 956 m), weight 79 2 kg (174 lb 9 7 oz), SpO2 97 %  ,Body mass index is 20 71 kg/m²    No intake or output data in the 24 hours ending 02/15/18 0854  Invasive Devices Peripheral Intravenous Line            Peripheral IV 02/14/18 Left Antecubital 1 day                Physical Exam   Constitutional: He is oriented to person, place, and time  Awake, no distress  On 3 L of oxygen O2 saturation is 97%  No conversational dyspnea   HENT:   Head: Normocephalic  Nose: Nose normal    Mouth/Throat: Oropharynx is clear and moist    Eyes: Conjunctivae are normal    Neck: Neck supple  No JVD present  Cardiovascular: Normal rate, regular rhythm and normal heart sounds  Pulmonary/Chest: Effort normal    Lung sounds are decreased but clear  No wheezing or rhonchi   Abdominal: Soft  He exhibits no distension  There is no tenderness  Musculoskeletal: He exhibits no edema  Lymphadenopathy:     He has no cervical adenopathy  Neurological: He is alert and oriented to person, place, and time  Skin: Skin is warm  No rash noted  Psychiatric: He has a normal mood and affect   His behavior is normal  Thought content normal        Lab Results: ABG: No results found for: PHART, YAM5WSO, PO2ART, QKA6JXP, M7BXDCTO, BEART, SOURCE, BNP: No results found for: BNP, CBC:   Lab Results   Component Value Date    WBC 6 30 02/15/2018    HGB 13 0 (L) 02/15/2018    HCT 38 0 (L) 02/15/2018    MCV 95 02/15/2018     02/15/2018    MCH 32 7 (H) 02/15/2018    MCHC 34 3 02/15/2018    RDW 14 0 02/15/2018    MPV 6 2 (L) 02/15/2018    NRBC 0 02/14/2018   , CMP:   Lab Results   Component Value Date     02/15/2018     09/27/2016    K 4 0 02/15/2018    K 4 2 09/27/2016     02/15/2018     09/27/2016    CO2 26 02/15/2018    CO2 23 09/27/2016    ANIONGAP 10 02/15/2018    BUN 13 02/15/2018    BUN 14 09/27/2016    CREATININE 1 12 02/15/2018    CREATININE 1 09 09/27/2016    GLUCOSE 141 (H) 02/15/2018    GLUCOSE 89 09/27/2016    CALCIUM 9 1 02/15/2018    CALCIUM 9 3 09/27/2016    AST 15 02/15/2018    AST 18 09/27/2016    ALT 32 02/15/2018    ALT 16 09/27/2016    ALKPHOS 45 (L) 02/15/2018 ALKPHOS 57 09/27/2016    PROT 6 5 02/15/2018    PROT 6 9 09/27/2016    BILITOT 1 00 02/15/2018    BILITOT 1 0 09/27/2016    EGFR 64 02/15/2018   , PT/INR:   Lab Results   Component Value Date    INR 1 06 02/14/2018   , Troponin: No results found for: TROPONIN    Imaging Studies: I have personally reviewed pertinent reports  and I have personally reviewed pertinent films in PACS    EKG, Pathology, and Other Studies: I have personally reviewed pertinent reports  VTE Prophylaxis: Enoxaparin (Lovenox)    Code Status: Level 1 - Full Code    Counseling/Coordination of Care: Total floor / unit time spent today 35 minutes  Greater than 50% of total time was spent with the patient and / or family counseling and / or coordination of care   A description of the counseling / coordination of care: , I reviewed patient's chart, I reviewed CT images of chest with patient, and I diagnosed various diagnosis and treatment with him

## 2018-02-17 LAB
AFP-TM SERPL-MCNC: 4.8 NG/ML (ref 0.5–8)
ANION GAP SERPL CALCULATED.3IONS-SCNC: 8 MMOL/L (ref 4–13)
ATRIAL RATE: 73 BPM
BUN SERPL-MCNC: 18 MG/DL (ref 5–25)
CALCIUM SERPL-MCNC: 9 MG/DL (ref 8.3–10.1)
CHLORIDE SERPL-SCNC: 100 MMOL/L (ref 100–108)
CO2 SERPL-SCNC: 31 MMOL/L (ref 21–32)
CREAT SERPL-MCNC: 1.14 MG/DL (ref 0.6–1.3)
ERYTHROCYTE [DISTWIDTH] IN BLOOD BY AUTOMATED COUNT: 14.3 % (ref 11.6–15.1)
GFR SERPL CREATININE-BSD FRML MDRD: 63 ML/MIN/1.73SQ M
GLUCOSE SERPL-MCNC: 86 MG/DL (ref 65–140)
HCT VFR BLD AUTO: 37.9 % (ref 42–52)
HGB BLD-MCNC: 12.6 G/DL (ref 14–18)
MAGNESIUM SERPL-MCNC: 1.9 MG/DL (ref 1.6–2.6)
MCH RBC QN AUTO: 32.4 PG (ref 27–31)
MCHC RBC AUTO-ENTMCNC: 33.4 G/DL (ref 31.4–37.4)
MCV RBC AUTO: 97 FL (ref 82–98)
P AXIS: -3 DEGREES
PHOSPHATE SERPL-MCNC: 3.8 MG/DL (ref 2.3–4.1)
PLATELET # BLD AUTO: 195 THOUSANDS/UL (ref 130–400)
PMV BLD AUTO: 6.3 FL (ref 8.9–12.7)
POTASSIUM SERPL-SCNC: 4.1 MMOL/L (ref 3.5–5.3)
PR INTERVAL: 140 MS
QRS AXIS: 17 DEGREES
QRSD INTERVAL: 102 MS
QT INTERVAL: 424 MS
QTC INTERVAL: 467 MS
RBC # BLD AUTO: 3.9 MILLION/UL (ref 4.7–6.1)
SODIUM SERPL-SCNC: 139 MMOL/L (ref 136–145)
T WAVE AXIS: 4 DEGREES
VENTRICULAR RATE: 73 BPM
WBC # BLD AUTO: 5.9 THOUSAND/UL (ref 4.8–10.8)

## 2018-02-17 PROCEDURE — 97166 OT EVAL MOD COMPLEX 45 MIN: CPT

## 2018-02-17 PROCEDURE — 85027 COMPLETE CBC AUTOMATED: CPT | Performed by: STUDENT IN AN ORGANIZED HEALTH CARE EDUCATION/TRAINING PROGRAM

## 2018-02-17 PROCEDURE — G8987 SELF CARE CURRENT STATUS: HCPCS

## 2018-02-17 PROCEDURE — 83735 ASSAY OF MAGNESIUM: CPT | Performed by: STUDENT IN AN ORGANIZED HEALTH CARE EDUCATION/TRAINING PROGRAM

## 2018-02-17 PROCEDURE — G8988 SELF CARE GOAL STATUS: HCPCS

## 2018-02-17 PROCEDURE — 82105 ALPHA-FETOPROTEIN SERUM: CPT | Performed by: INTERNAL MEDICINE

## 2018-02-17 PROCEDURE — 84100 ASSAY OF PHOSPHORUS: CPT | Performed by: STUDENT IN AN ORGANIZED HEALTH CARE EDUCATION/TRAINING PROGRAM

## 2018-02-17 PROCEDURE — 94760 N-INVAS EAR/PLS OXIMETRY 1: CPT

## 2018-02-17 PROCEDURE — 80048 BASIC METABOLIC PNL TOTAL CA: CPT | Performed by: STUDENT IN AN ORGANIZED HEALTH CARE EDUCATION/TRAINING PROGRAM

## 2018-02-17 PROCEDURE — 94640 AIRWAY INHALATION TREATMENT: CPT

## 2018-02-17 PROCEDURE — 93010 ELECTROCARDIOGRAM REPORT: CPT | Performed by: INTERNAL MEDICINE

## 2018-02-17 RX ADMIN — ZOLPIDEM TARTRATE 10 MG: 5 TABLET ORAL at 22:46

## 2018-02-17 RX ADMIN — TAMSULOSIN HYDROCHLORIDE 0.4 MG: 0.4 CAPSULE ORAL at 22:43

## 2018-02-17 RX ADMIN — AMLODIPINE BESYLATE 5 MG: 5 TABLET ORAL at 09:08

## 2018-02-17 RX ADMIN — FLUTICASONE PROPIONATE AND SALMETEROL 1 PUFF: 50; 250 POWDER RESPIRATORY (INHALATION) at 22:42

## 2018-02-17 RX ADMIN — ENOXAPARIN SODIUM 80 MG: 80 INJECTION SUBCUTANEOUS at 09:08

## 2018-02-17 RX ADMIN — IPRATROPIUM BROMIDE AND ALBUTEROL SULFATE 3 ML: .5; 3 SOLUTION RESPIRATORY (INHALATION) at 14:52

## 2018-02-17 RX ADMIN — IPRATROPIUM BROMIDE AND ALBUTEROL SULFATE 3 ML: .5; 3 SOLUTION RESPIRATORY (INHALATION) at 07:42

## 2018-02-17 RX ADMIN — FLUTICASONE PROPIONATE AND SALMETEROL 1 PUFF: 50; 250 POWDER RESPIRATORY (INHALATION) at 09:08

## 2018-02-17 RX ADMIN — PANTOPRAZOLE SODIUM 40 MG: 40 TABLET, DELAYED RELEASE ORAL at 05:49

## 2018-02-17 RX ADMIN — FLUTICASONE PROPIONATE 1 SPRAY: 50 SPRAY, METERED NASAL at 09:08

## 2018-02-17 RX ADMIN — IPRATROPIUM BROMIDE AND ALBUTEROL SULFATE 3 ML: .5; 3 SOLUTION RESPIRATORY (INHALATION) at 01:59

## 2018-02-17 RX ADMIN — MECLIZINE HCL 12.5 MG: 12.5 TABLET ORAL at 09:08

## 2018-02-17 RX ADMIN — IPRATROPIUM BROMIDE AND ALBUTEROL SULFATE 3 ML: .5; 3 SOLUTION RESPIRATORY (INHALATION) at 20:57

## 2018-02-17 RX ADMIN — ENOXAPARIN SODIUM 80 MG: 80 INJECTION SUBCUTANEOUS at 22:42

## 2018-02-17 NOTE — PROGRESS NOTES
Progress Note - Pulmonary   Sergio Silver 75 y o  male MRN: [id number]  Unit/Bed#: 2 [place] [address] Encounter: [id number]    Assessment:  Acute pulmonary embolism and distal left main pulmonary artery and proximal left lower lobe segmental branches  Venous Doppler studies of legs reveal subacute to chronic nonocclusive DVT of the left popliteal vein  Patient is tolerating Lovenox 80 mg SQ every 12 hours  Right upper lobe lung nodule 1 2 x 0 8 cm with mild hypermetabolic activity with SUV of 3 4 noted on recent PET-CT scan done as outpatient  This could be inflammatory lesion from recent RSV respiratory tract infection versus early neoplasm  There is a 2nd 1 3 x 0 6 cm left lower lobe lung nodule without any hypermetabolism  This also could be an inflammatory nodule  Moderate to severe centrilobular emphysema  FEV1 is 2 14 L or 55% of predicted  Alpha 1 antitrypsin deficiency diagnosed 2002  Patient's phenotype is PiSZ -he is on weekly IV AAT  Plan:  I spoke with medical team and Dr Marilee Jones, attending physician in regard to the management of the lung nodule and venous thromboembolism  I think the best approach here would be to do a follow-up a CTA of the chest in 6 weeks  This would help assess if the left pulmonary embolism has resolved and also week and then determine if there has been any change in the 2 lung nodules noted above  I did put had a call to IR  The right upper lobe nodule may or may not be amenable to CT-guided biopsy but this can be determined after patient has ups follow-up CT of the chest in 6 weeks  Continue nebulizer treatments with DuoNeb  Depending on his prescription medical insurance coverage could consider either Xarelto or Eliquis  If not covered then warfarin      Will order home O2 ambulatory oximetry protocol tomorrow to see if there is any significant oxygen desaturation with activity      Subjective:   Patient states he does have some shortness of breath when he walks to the bathroom  At rest he is comfortable  No hemoptysis or cough  Objective:     Vitals: Blood pressure 152/70, pulse 78, temperature 98 1 °F (36 7 °C), temperature source Oral, resp  rate 20, height 6' 5" (1 956 m), weight 79 2 kg (174 lb 9 7 oz), SpO2 98 %  ,Body mass index is 20 71 kg/m²  Intake/Output Summary (Last 24 hours) at 02/16/18 2036  Last data filed at 02/16/18 0719   Gross per 24 hour   Intake              240 ml   Output                0 ml   Net              240 ml       Physical Exam: Physical Exam   Constitutional: He is oriented to person, place, and time  He appears well-developed  HENT:   Head: Normocephalic  Right Ear: External ear normal    Mouth/Throat: No oropharyngeal exudate  Eyes: Conjunctivae are normal    Neck: Neck supple  No JVD present  Cardiovascular: Normal rate, regular rhythm and normal heart sounds  Pulmonary/Chest: Effort normal    Lung sounds are decreased but clear   Abdominal: Soft  He exhibits no distension  There is no tenderness  There is no guarding  Musculoskeletal: He exhibits no edema  Neurological: He is alert and oriented to person, place, and time  Skin: Skin is warm and dry  No rash noted  He is not diaphoretic  No erythema  Psychiatric: He has a normal mood and affect  His behavior is normal  Thought content normal       On 2 l/m nc O2 - 98%    Labs: I have personally reviewed pertinent lab results  , ABG: No results found for: PHART, JBS2JAB, PO2ART, TMT5JTY, K7XFIBCJ, BEART, SOURCE, BNP: No results found for: BNP, CBC: Lab Results   Component Value Date    WBC 8 70 02/16/2018    HGB 11 9 (L) 02/16/2018    HCT 35 4 (L) 02/16/2018    MCV 96 02/16/2018     02/16/2018    MCH 32 4 (H) 02/16/2018    MCHC 33 6 02/16/2018    RDW 14 3 02/16/2018    MPV 6 0 (L) 02/16/2018    NRBC 0 02/14/2018   , CMP: Lab Results   Component Value Date     02/16/2018     09/27/2016    K 3 7 02/16/2018    K 4 2 09/27/2016     02/16/2018  09/27/2016    CO2 30 02/16/2018    CO2 23 09/27/2016    ANIONGAP 4 02/16/2018    BUN 19 02/16/2018    BUN 14 09/27/2016    CREATININE 1 09 02/16/2018    CREATININE 1 09 09/27/2016    GLUCOSE 108 02/16/2018    GLUCOSE 89 09/27/2016    CALCIUM 9 0 02/16/2018    CALCIUM 9 3 09/27/2016    AST 15 02/15/2018    AST 18 09/27/2016    ALT 32 02/15/2018    ALT 16 09/27/2016    ALKPHOS 45 (L) 02/15/2018    ALKPHOS 57 09/27/2016    PROT 6 5 02/15/2018    PROT 6 9 09/27/2016    BILITOT 1 00 02/15/2018    BILITOT 1 0 09/27/2016    EGFR 67 02/16/2018   , PT/INR:   Lab Results   Component Value Date    INR 1 06 02/14/2018   , Troponin: No results found for: TROPONIN    Imaging and other studies: I have personally reviewed pertinent reports     and I have personally reviewed pertinent films in PACS

## 2018-02-17 NOTE — PROGRESS NOTES
[facility] Progress Note - Francie Sun 75 y o  male MRN: [id number]    Unit/Bed#: [address] [place] [address] Encounter: [id number]      Assessment/Plan:    Shortness of breath likely secondary to pulmonary embolism  - CTA chest:  Acute PE in distal left main pulmonary artery and proximal left lower lobe segmental branches   Stable right upper and left lower pulmonary nodules  - Chest x-ray:  COPD, right upper lobe nodule density, no acute infiltrates  - Troponin negative x1, EKG within normal limits  - Continue therapeutic Lovenox 1 mg/kg bid  -  Pt on 3L O2 via NC as needed   - Venous duplex b/l LE study: Right- negative  Left- subacute to chronic non occlusive DVT of the popliteal vein   - PT/OTfollowing   - Dr Tyler Weaver following, recommendation greatly appreciated        Neoplasm of uncertain behavior of trachea, bronchus, and lung  - Follows with Dr Christiana Oliver  - PET CT:  Hypermetabolic nodules  - CTA chest - right upper and left lower lobes measuring 1 2 x 1 3 centimeter   Tissue sampling of right upper lobe nodule is planned   Three-month follow-up of left lower lobe nodule as previously recommended  - Pulmonology recommendations appreciated      Alpha 1 antitrypsin deficiency  - Continue with weekly Zemaira on Tuesdays  - Alpha-fetoprotein level ordered   - Pulmonology consult appreciated   - Heme/Onc Dr Tyler Weaver consulted, recommendations highly appreciated      COPD   - Continue with Advair 1 puff Q12H and Duonebs Q6H  - Will continue to hold home medication Spiriva     Hypertension  - stable  - Latest /77   - Continue with amlodipine 5 mg daily     GERD  - Stable  - Continue Protonix 40 mg PO Daily     BPH  - Stable  - Continue with Flomax 0 4 mg PO qhs       Insomnia  - Started on home dose Ambien 10mg PO Daily at bedtime PRN       Global  - Regular diet  - replete electrolytes as necessary  - Lovenox and SCDs       Subjective:   PT seen and examined at bedside   Pt states that he has some improvement in his breathing today  This morning pt complains of burning sensation in his chest, which he states feels like reflux but is sure that it is not that  Denies fever, chills, abdominal pain or urinary changes  Objective:     Vitals: Blood pressure 144/70, pulse 73, temperature 98 1 °F (36 7 °C), temperature source Oral, resp  rate 17, height 6' 5" (1 956 m), weight 79 2 kg (174 lb 9 7 oz), SpO2 97 %  ,Body mass index is 20 71 kg/m²    Wt Readings from Last 3 Encounters:   02/14/18 79 2 kg (174 lb 9 7 oz)   02/13/18 79 4 kg (175 lb)   02/06/18 79 8 kg (176 lb)     No intake or output data in the 24 hours ending 02/17/18 1149    Physical Exam: General appearance: alert and oriented, in no acute distress  Lungs: diminished breath sounds b/l , R>L  Heart: regular rate and rhythm, S1, S2 normal, no murmur, click, rub or gallop  Abdomen: soft, non-tender; bowel sounds normal; no masses,  no organomegaly  Extremities: extremities normal, warm and well-perfused; no cyanosis, clubbing, or edema     Recent Results (from the past 24 hour(s))   Basic metabolic panel    Collection Time: 02/17/18  7:15 AM   Result Value Ref Range    Sodium 139 136 - 145 mmol/L    Potassium 4 1 3 5 - 5 3 mmol/L    Chloride 100 100 - 108 mmol/L    CO2 31 21 - 32 mmol/L    Anion Gap 8 4 - 13 mmol/L    BUN 18 5 - 25 mg/dL    Creatinine 1 14 0 60 - 1 30 mg/dL    Glucose 86 65 - 140 mg/dL    Calcium 9 0 8 3 - 10 1 mg/dL    eGFR 63 ml/min/1 73sq m   CBC    Collection Time: 02/17/18  7:15 AM   Result Value Ref Range    WBC 5 90 4 80 - 10 80 Thousand/uL    RBC 3 90 (L) 4 70 - 6 10 Million/uL    Hemoglobin 12 6 (L) 14 0 - 18 0 g/dL    Hematocrit 37 9 (L) 42 0 - 52 0 %    MCV 97 82 - 98 fL    MCH 32 4 (H) 27 0 - 31 0 pg    MCHC 33 4 31 4 - 37 4 g/dL    RDW 14 3 11 6 - 15 1 %    Platelets 119 171 - 835 Thousands/uL    MPV 6 3 (L) 8 9 - 12 7 fL   Magnesium    Collection Time: 02/17/18  7:15 AM   Result Value Ref Range    Magnesium 1 9 1 6 - 2 6 mg/dL   Phosphorus    Collection Time: 02/17/18  7:15 AM   Result Value Ref Range    Phosphorus 3 8 2 3 - 4 1 mg/dL       Current Facility-Administered Medications   Medication Dose Route Frequency Provider Last Rate Last Dose    amLODIPine (NORVASC) tablet 5 mg  5 mg Oral QAM Santosh Marte MD   5 mg at 02/17/18 0908    enoxaparin (LOVENOX) subcutaneous injection 80 mg  1 mg/kg Subcutaneous Q12H [facility] Santosh Marte MD   80 mg at 02/17/18 0908    fluticasone (FLONASE) 50 mcg/act nasal spray 1 spray  1 spray Each Nare Daily Santosh Marte MD   1 spray at 02/17/18 0908    fluticasone-salmeterol (ADVAIR) 250-50 mcg/dose inhaler 1 puff  1 puff Inhalation Q12H [facility] Santosh Marte MD   1 puff at 02/17/18 0908    ipratropium-albuterol (DUO-NEB) 0 5-2 5 mg/3 mL inhalation solution 3 mL  3 mL Nebulization Q6H Santosh Marte MD   3 mL at 02/17/18 0742    meclizine (ANTIVERT) tablet 12 5 mg  12 5 mg Oral Daily Santosh Marte MD   12 5 mg at 02/17/18 0908    melatonin tablet 3 mg  3 mg Oral HS Santosh Marte MD   3 mg at 02/15/18 2213    pantoprazole (PROTONIX) EC tablet 40 mg  40 mg Oral Early Morning Santosh Marte MD   40 mg at 02/17/18 0549    tamsulosin (FLOMAX) capsule 0 4 mg  0 4 mg Oral HS Santosh Marte MD   0 4 mg at 02/16/18 2308    zolpidem (AMBIEN) tablet 10 mg  10 mg Oral HS PRN Nell Sahu DO   10 mg at 02/16/18 2308       Invasive Devices     Peripheral Intravenous Line            Peripheral IV 02/14/18 Left Antecubital 2 days                Lab, Imaging and other studies: I have personally reviewed pertinent reports  VTE Pharmacologic Prophylaxis: Enoxaparin (Lovenox)  VTE Mechanical Prophylaxis: sequential compression device    Home Mcintosh DO     SENIOR RESIDENT NOTE: I have personally seen and examined the patient, and agree with the resident's assessment       Magy Marcelo MD

## 2018-02-17 NOTE — ED PROVIDER NOTES
History  Chief Complaint   Patient presents with    Shortness of Breath     Patient started with increase in SOB this am   States that he was at the Pulmonogist and couldnt' catch his breath  Coming from Dr Juan Jose Blount office  Patient presents for evaluation of dyspnea worse over the last 2 days  Sent over from pulmonary rehab  Recently admitted for similar symtpoms and was discharged on oxygen  Being evaluated for RUL mass  History provided by:  Patient   used: No    Shortness of Breath   Associated symptoms: cough    Associated symptoms: no chest pain and no fever        Prior to Admission Medications   Prescriptions Last Dose Informant Patient Reported? Taking?    Alpha1-Proteinase Inhibitor (ZEMAIRA IV) 2/13/2018 at Unknown time Self Yes Yes   Sig: Infuse into a venous catheter once a week On tuesday -1000mg /50 ml    ZEMAIRA injection 2/13/2018 at Unknown time  Yes Yes   albuterol (2 5 mg/3 mL) 0 083 % nebulizer solution   Yes No   Sig: Inhale 1 each every 4 (four) hours as needed   albuterol (PROVENTIL HFA,VENTOLIN HFA) 90 mcg/act inhaler 2/14/2018 at Unknown time  No Yes   Sig: Inhale 2 puffs every 6 (six) hours as needed for wheezing   amLODIPine (NORVASC) 5 mg tablet 2/14/2018 at Unknown time  No Yes   Sig: Take 1 tablet (5 mg total) by mouth every morning   fluticasone (FLONASE) 50 mcg/act nasal spray 2/14/2018 at Unknown time  Yes Yes   fluticasone-salmeterol (ADVAIR DISKUS) 250-50 mcg/dose inhaler 2/14/2018 at Unknown time  Yes Yes   Sig: Inhale   meclizine (ANTIVERT) 12 5 MG tablet   Yes Yes   Sig: Take 1 tablet by mouth daily   omeprazole (PriLOSEC) 40 MG capsule 2/13/2018 at 1700  Yes Yes   tamsulosin (FLOMAX) 0 4 mg 2/13/2018 at 2000 Self Yes Yes   Sig: Take 0 4 mg by mouth daily at bedtime   zolpidem (AMBIEN CR) 6 25 MG CR tablet   Yes Yes   Sig: Take 10 mg by mouth daily at bedtime as needed for sleep      Facility-Administered Medications: None       Past Medical History:   Diagnosis Date    Anesthesia complication     Difficult to wake up    Arthritis     BPH (benign prostatic hyperplasia)     urinary frequency    Cancer (HCC)     basal cell neck, face    COPD (chronic obstructive pulmonary disease) (HCC)     Full dentures     Hypertension     controlled    Kidney stone     at least 7 episodes    Liver disease     Alpha 1- enzyme deficiency - diagnosed 2002  has been on weekly replacement therapy since then    Pulmonary emphysema ([facility] [address] )     1/25/15  FEV1 - 2 45 liters or 59% of predicted    Wears glasses     for driving only       Past Surgical History:   Procedure Laterality Date    BACK SURGERY  2008    discectomy    COLONOSCOPY      COLONOSCOPY N/A 3/10/2017    Procedure: Elfredia Reus;  Surgeon: Yakelin Matthew MD;  Location: [facility]; Service:    Karyn Grandchild      removal of kidney stones    ESOPHAGOGASTRODUODENOSCOPY N/A 3/10/2017    Procedure: ESOPHAGOGASTRODUODENOSCOPY (EGD); Surgeon: Yakelin Matthew MD;  Location: [facility] GI LAB; Service:     LITHOTRIPSY      TONSILLECTOMY      VEIN LIGATION AND STRIPPING Bilateral     17's       Family History   Problem Relation Age of Onset    Emphysema Mother      never smoked    Emphysema Father     Cancer Brother      colon     I have reviewed and agree with the history as documented  Social History   Substance Use Topics    Smoking status: Former Smoker     Packs/day: 1 00     Years: 60 00     Quit date: 8/31/2017    Smokeless tobacco: Never Used      Comment: quit in august 2017    Alcohol use No        Review of Systems   Constitutional: Negative for fever  Respiratory: Positive for cough and shortness of breath  Cardiovascular: Negative for chest pain  All other systems reviewed and are negative        Physical Exam  ED Triage Vitals   Temperature Pulse Respirations Blood Pressure SpO2   02/14/18 1548 02/14/18 1548 02/14/18 1548 02/14/18 1548 02/14/18 1548   97 7 °F (36 5 °C) 75 (!) 24 (!) 184/89 98 %      Temp Source Heart Rate Source Patient Position - Orthostatic VS BP Location FiO2 (%)   02/14/18 1946 02/14/18 1730 02/14/18 1730 02/14/18 1730 --   Oral Monitor Sitting Right arm       Pain Score       02/14/18 1548       5           Orthostatic Vital Signs  Vitals:    02/16/18 1135 02/16/18 1337 02/16/18 2045 02/16/18 2203   BP: 138/64 152/70 148/70 137/71   Pulse: 71 78 79 75   Patient Position - Orthostatic VS: Lying Lying Lying Lying       Physical Exam   Constitutional: He is oriented to person, place, and time  No distress  HENT:   Mouth/Throat: Oropharynx is clear and moist    Eyes: Pupils are equal, round, and reactive to light  Neck: Normal range of motion  Cardiovascular: Normal rate, regular rhythm and intact distal pulses  Pulmonary/Chest: He is in respiratory distress  He has wheezes  Mild/moderate respiratory distress with expiratory wheezes   Abdominal: Soft  There is no tenderness  Musculoskeletal: Normal range of motion  Neurological: He is alert and oriented to person, place, and time  Skin: Capillary refill takes less than 2 seconds  He is not diaphoretic  Nursing note and vitals reviewed        ED Medications  Medications   amLODIPine (NORVASC) tablet 5 mg (5 mg Oral Given 2/16/18 0834)   fluticasone (FLONASE) 50 mcg/act nasal spray 1 spray (1 spray Each Nare Given 2/16/18 0834)   fluticasone-salmeterol (ADVAIR) 250-50 mcg/dose inhaler 1 puff (1 puff Inhalation Given 2/16/18 2024)   meclizine (ANTIVERT) tablet 12 5 mg (12 5 mg Oral Given 2/16/18 0834)   pantoprazole (PROTONIX) EC tablet 40 mg (40 mg Oral Given 2/16/18 0533)   tamsulosin (FLOMAX) capsule 0 4 mg (0 4 mg Oral Given 2/16/18 2308)   enoxaparin (LOVENOX) subcutaneous injection 80 mg (80 mg Subcutaneous Given 2/16/18 2024)   ipratropium-albuterol (DUO-NEB) 0 5-2 5 mg/3 mL inhalation solution 3 mL (3 mL Nebulization Given 2/16/18 2049)   melatonin tablet 3 mg (3 mg Oral Not Given 2/16/18 2310)   zolpidem (AMBIEN) tablet 10 mg (10 mg Oral Given 2/16/18 2308)   ipratropium-albuterol (DUO-NEB) 0 5-2 5 mg/3 mL inhalation solution 3 mL (3 mL Nebulization Given 2/14/18 1621)   ipratropium-albuterol (DUO-NEB) 0 5-2 5 mg/3 mL inhalation solution 3 mL (3 mL Nebulization Given 2/14/18 1734)   methylPREDNISolone sodium succinate (Solu-MEDROL) injection 125 mg (125 mg Intravenous Given 2/14/18 1623)   iohexol (OMNIPAQUE) 350 MG/ML injection (MULTI-DOSE) 85 mL (85 mL Intravenous Given 2/14/18 1657)       Diagnostic Studies  Results Reviewed     Procedure Component Value Units Date/Time    Troponin I [[id number]]  (Normal) Collected:  02/14/18 1615    Lab Status:  Final result Specimen:  Blood from Arm, Left Updated:  02/14/18 1642     Troponin I <0 02 ng/mL     Narrative:         [employer] Chemistry analyzer 99% cutoff is > 0 04 ng/mL in network labs    o cTnI 99% cutoff is useful only when applied to patients in the clinical setting of myocardial ischemia  o cTnI 99% cutoff should be interpreted in the context of clinical history, ECG findings and possibly cardiac imaging to establish correct diagnosis  o cTnI 99% cutoff may be suggestive but clearly not indicative of a coronary event without the clinical setting of myocardial ischemia      Comprehensive metabolic panel [[id number]]  (Abnormal) Collected:  02/14/18 1615    Lab Status:  Final result Specimen:  Blood from Arm, Left Updated:  02/14/18 1637     Sodium 141 mmol/L      Potassium 3 5 mmol/L      Chloride 103 mmol/L      CO2 26 mmol/L      Anion Gap 12 mmol/L      BUN 12 mg/dL      Creatinine 1 08 mg/dL      Glucose 104 mg/dL      Calcium 9 4 mg/dL      AST 22 U/L      ALT 38 U/L      Alkaline Phosphatase 56 U/L      Total Protein 7 1 g/dL      Albumin 3 9 g/dL      Total Bilirubin 1 20 (H) mg/dL      eGFR 67 ml/min/1 73sq m     Narrative:         National Kidney Disease Education Program recommendations are as follows:  GFR calculation is accurate only with a steady state creatinine  Chronic Kidney disease less than 60 ml/min/1 73 sq  meters  Kidney failure less than 15 ml/min/1 73 sq  meters  Ignacio Simeon [[id number]]  (Normal) Collected:  02/14/18 1615    Lab Status:  Final result Specimen:  Blood from Arm, Left Updated:  02/14/18 1635     Protime 11 1 seconds      INR 1 06    APTT [[id number]]  (Normal) Collected:  02/14/18 1615    Lab Status:  Final result Specimen:  Blood from Arm, Left Updated:  02/14/18 1635     PTT 25 seconds     Narrative: Therapeutic Heparin Range = 60-90 seconds    CBC and differential [[id number]]  (Abnormal) Collected:  02/14/18 1615    Lab Status:  Final result Specimen:  Blood from Arm, Left Updated:  02/14/18 1622     WBC 7 70 Thousand/uL      RBC 4 22 (L) Million/uL      Hemoglobin 13 7 (L) g/dL      Hematocrit 40 2 (L) %      MCV 95 fL      MCH 32 5 (H) pg      MCHC 34 1 g/dL      RDW 14 3 %      MPV 5 9 (L) fL      Platelets 231 Thousands/uL      nRBC 0 /100 WBCs      Neutrophils Relative 76 (H) %      Lymphocytes Relative 16 %      Monocytes Relative 7 %      Eosinophils Relative 1 %      Basophils Relative 0 %      Neutrophils Absolute 5 80 Thousands/µL      Lymphocytes Absolute 1 20 Thousands/µL      Monocytes Absolute 0 60 Thousand/µL      Eosinophils Absolute 0 10 Thousand/µL      Basophils Absolute 0 00 Thousands/µL                  VAS lower limb venous duplex study, complete bilateral   Final Result by Claude Staggers, MD (02/16 1646)      CTA ED chest PE study   Final Result by Trevor Banerjee MD (02/14 1738)         1  Acute pulmonary embolus in the distal left main pulmonary artery and proximal left lower lobe segmental branches  2   Moderate panlobular emphysema  3   Stable right upper and left lower pulmonary nodules measuring 1 2 x 1 3 cm  PET/CT was performed, based on current Fleischner Society 2017 Guidelines on incidental pulmonary nodule    Tissue sampling of the right upper lobe hypermetabolic nodule is planned  3 month follow-up of left lower lobe pulmonary nodule as previously recommended  I personally discussed this study with Dr Virginia Martines on 2/14/2018 at 5:34 PM                      Workstation performed: [id number]         XR chest 1 view portable   Final Result by Brandon Nazario DO (02/14 2657)      COPD  Right upper lobe nodular density  See previous CT recommendations  No acute infiltrates  Workstation performed: [id number]                    Procedures  Procedures       Phone Contacts  ED Phone Contact    ED Course  ED Course                                MDM  Number of Diagnoses or Management Options  COPD (chronic obstructive pulmonary disease) ([facility] [address] ):   Lung nodules:   Pulmonary embolism ([facility] ):   Respiratory distress:   Diagnosis management comments: Pulse ox 97% on NC indicating adequate oxygenation    Patient treated with nebulizer and steroids  Given the likely malignancy increasing SOB and oxygen requirement will obtain CTA for PE  Signed out to next provider pending CT results          Amount and/or Complexity of Data Reviewed  Clinical lab tests: ordered and reviewed  Tests in the radiology section of CPT®: ordered  Decide to obtain previous medical records or to obtain history from someone other than the patient: yes  Review and summarize past medical records: yes  Discuss the patient with other providers: yes    Patient Progress  Patient progress: stable    CritCare Time    Disposition  Final diagnoses:   Respiratory distress   COPD (chronic obstructive pulmonary disease) ([facility] [address] )   Lung nodules   Pulmonary embolism ([facility] )     Time reflects when diagnosis was documented in both MDM as applicable and the Disposition within this note     Time User Action Codes Description Comment    4/92/5679  8:29 PM Monika Prima A Add [F14 22] Respiratory distress     5/73/5609  8:21 PM Monika Prima A Add [J85 6] COPD (chronic obstructive pulmonary disease) ([facility] )     2/14/2018  5:45 PM Ayo Townsend Add [R61 5] Lung nodules     3/87/5097  2:50 PM Yue FRENCH Add [Z93 53] Pulmonary embolism ([facility] )     2/14/2018  8:37 PM Mesfin Melchor Add [D49 1] Lung neoplasm       ED Disposition     ED Disposition Condition Comment    Admit  Case was discussed with *Dr Chetna Mitchell** and the patient's admission status was agreed to be Admission Status: inpatient status         Follow-up Information    None       Current Discharge Medication List      CONTINUE these medications which have NOT CHANGED    Details   albuterol (PROVENTIL HFA,VENTOLIN HFA) 90 mcg/act inhaler Inhale 2 puffs every 6 (six) hours as needed for wheezing  Qty: 1 Inhaler, Refills: 0    Comments: Please substitute based on patient insurance  Associated Diagnoses: Chronic obstructive pulmonary disease, unspecified COPD type ([facility] )      ! ! Alpha1-Proteinase Inhibitor (ZEMAIRA IV) Infuse into a venous catheter once a week On tuesday -1000mg /50 ml       amLODIPine (NORVASC) 5 mg tablet Take 1 tablet (5 mg total) by mouth every morning  Qty: 30 tablet, Refills: 5    Associated Diagnoses: Essential hypertension      fluticasone (FLONASE) 50 mcg/act nasal spray       fluticasone-salmeterol (ADVAIR DISKUS) 250-50 mcg/dose inhaler Inhale      meclizine (ANTIVERT) 12 5 MG tablet Take 1 tablet by mouth daily      omeprazole (PriLOSEC) 40 MG capsule       tamsulosin (FLOMAX) 0 4 mg Take 0 4 mg by mouth daily at bedtime      !! ZEMAIRA injection       zolpidem (AMBIEN CR) 6 25 MG CR tablet Take 10 mg by mouth daily at bedtime as needed for sleep      albuterol (2 5 mg/3 mL) 0 083 % nebulizer solution Inhale 1 each every 4 (four) hours as needed       !! - Potential duplicate medications found  Please discuss with provider  No discharge procedures on file      ED Provider  Electronically Signed by           Paige Bee DO  02/17/18 8616

## 2018-02-17 NOTE — OCCUPATIONAL THERAPY NOTE
OT EVALUATION   02/17/18 1115   Note Type   Note type Eval only   Restrictions/Precautions   Other Precautions O2;Fall Risk   Pain Assessment   Pain Assessment No/denies pain   Pain Score No Pain   Diversional Activities Television   Home Living   Type of [address] One level;Stairs to enter with rails; Performs ADLs on one level; Able to live on main level with bedroom/bathroom   Bathroom Shower/Tub Walk-in shower   Home Equipment (oxygen)   Additional Comments pt reports he drives or his dtr drives him   Prior Function   Level of [place] Independent with ADLs and functional mobility   Lives With Alone   Receives Help From Family; Neighbor   ADL Assistance Independent   IADLs Needs assistance   Falls in the last 6 months (pt denies but reports that he is frequently dizzy)   Comments pt reports that he enjoys going down to the [place] to play pool but he's been too tired to do anything lately   Subjective   Subjective I get up in the morning and by 8 a m  I am too tired for anything else  ADL   Where Assessed Edge of bed   Eating Assistance 7  Independent   Grooming Assistance 7  Independent   UB Dressing Assistance 5  Supervision/Setup   LB Dressing Assistance 5  Supervision/Setup   Additional Comments pt SOB with exertion   Bed Mobility   Rolling R 5  Supervision   Supine to Sit 5  Supervision   Sit to Supine 5  Supervision   Transfers   Sit to Stand 5  Supervision   Additional items Increased time required   Stand to Sit 5  Supervision   Additional items Bedrails   Additional Comments pt declined sitting OOB in chair stating it's too tiring     Functional Mobility   Functional Mobility 4  Minimal assistance   Additional Comments O2 follow, 40 feet   Additional items Hand hold assistance   Balance   Static Sitting Fair +   Dynamic Sitting Fair +   Static Standing Fair +   Dynamic Standing Fair   Activity Tolerance   Activity Tolerance Patient limited by fatigue  (pt limited by SOB and decreased endurance)   RUE Assessment   RUE Assessment (ROM: WFL MMT: 4+/5)   LUE Assessment   LUE Assessment (ROM: WFL MMT: 4+/5)   Cognition   Overall Cognitive Status WFL   Arousal/Participation Alert; Cooperative   Attention Within functional limits   Orientation Level Oriented X4   Following Commands Follows all commands and directions without difficulty   Assessment   Limitation Decreased ADL status; Decreased UE strength;Decreased endurance;Decreased self-care trans;Decreased high-level ADLs   Prognosis Good   Assessment Patient evaluated by Occupational Therapy  Patient admitted with Acute pulmonary embolism ([facility] [address] )  The patients occupational profile, medical and therapy history includes a expanded review of medical and/or therapy records and additional review of physical, cognitive, or psychosocial history related to current functional performance  Comorbidities affecting functional mobility and ADLS include: COPD, GERD and hypertension, lung nodules, lung neoplasm, chest pain, SOB, BPH  Prior to admission, patient was independent with functional mobility without assistive device, independent with ADLS, requiring assist for IADLS, living alone in single story home with 2 steps to enter and ambulating household distance  The evaluation identifies the following performance deficits: weakness, impaired balance, decreased endurance, increased fall risk, decreased ADLS, decreased IADLS, decreased activity tolerance, SOB upon exertion and decreased strength, that result in activity limitations and/or participation restrictions  This evaluation requires clinical decision making of moderate complexity, because the patient may present with comorbidities that affect occupational performance and required minimal or moderate modification of tasks or assistance with the consideration of several treatment options    The Barthel Index was used as a functional outcome tool presenting with a score of 65, indicating moderate limitations of functional mobility and ADLS  Patient will benefit from skilled Occupational Therapy services to address above deficits and facilitate a safe return to prior level of function  Goals   Patient Goals To go home   STG Time Frame (1-7 days)   Short Term Goal #1 Pt will complete 1-2 ADLs in stance with rest breaks as needed  Short Term Goal #2 pt will demonstrate pursed lip breathing to decrease SOB after instruction with MIN cues  LTG Time Frame (8-14 days)   Long Term Goal #1 Pt will complete 3+ ADLs in stance with rest breaks as needed  Long Term Goal #2 pt will complete pursed lip breathing to decrease SOB with exertion independently  Functional Transfer Goals   Pt Will Perform All Functional Transfers (STG: independent LTG: independent)   ADL Goals   Pt Will Perform All ADL's (STG: independent LTG: independent)   Plan   Treatment Interventions ADL retraining;Functional transfer training;UE strengthening/ROM; Endurance training; Activityengagement; Energy conservation;Patient/family training;Equipment evaluation/education   OT Frequency 3-5x/wk   Recommendation   OT Discharge Recommendation Home with family support  (home health services/PT/OT)   Equipment Recommended (shower chair, long handled reacher)   Barthel Index   Feeding 10   Bathing 0   Grooming Score 5   Dressing Score 10   Bladder Score 10   Bowels Score 10   Toilet Use Score 10   Transfers (Bed/Chair) Score 10   Mobility (Level Surface) Score 0   Stairs Score 0   Barthel Index Score 65

## 2018-02-17 NOTE — PLAN OF CARE
INFECTION - ADULT     Absence of fever/infection during neutropenic period Completed          CARDIOVASCULAR - ADULT     Maintains optimal cardiac output and hemodynamic stability Progressing     Absence of cardiac dysrhythmias or at baseline rhythm Progressing        DISCHARGE PLANNING     Discharge to home or other facility with appropriate resources Progressing        DISCHARGE PLANNING - CARE MANAGEMENT     Discharge to post-acute care or home with appropriate resources Progressing        INFECTION - ADULT     Absence or prevention of progression during hospitalization Progressing        Knowledge Deficit     Patient/family/caregiver demonstrates understanding of disease process, treatment plan, medications, and discharge instructions Progressing        PAIN - ADULT     Verbalizes/displays adequate comfort level or baseline comfort level Progressing        Potential for Falls     Patient will remain free of falls Progressing        RESPIRATORY - ADULT     Achieves optimal ventilation and oxygenation Progressing        SAFETY ADULT     Maintain or return to baseline ADL function Progressing     Maintain or return mobility status to optimal level Progressing        SKIN/TISSUE INTEGRITY - ADULT     Skin integrity remains intact Progressing

## 2018-02-17 NOTE — PLAN OF CARE
CARDIOVASCULAR - ADULT     Maintains optimal cardiac output and hemodynamic stability Progressing     Absence of cardiac dysrhythmias or at baseline rhythm Progressing        DISCHARGE PLANNING     Discharge to home or other facility with appropriate resources Progressing        DISCHARGE PLANNING - CARE MANAGEMENT     Discharge to post-acute care or home with appropriate resources Progressing        INFECTION - ADULT     Absence or prevention of progression during hospitalization Progressing     Absence of fever/infection during neutropenic period Progressing        Knowledge Deficit     Patient/family/caregiver demonstrates understanding of disease process, treatment plan, medications, and discharge instructions Progressing        PAIN - ADULT     Verbalizes/displays adequate comfort level or baseline comfort level Progressing        Potential for Falls     Patient will remain free of falls Progressing        RESPIRATORY - ADULT     Achieves optimal ventilation and oxygenation Progressing        SAFETY ADULT     Maintain or return to baseline ADL function Progressing     Maintain or return mobility status to optimal level Progressing        SKIN/TISSUE INTEGRITY - ADULT     Skin integrity remains intact Progressing

## 2018-02-18 VITALS
OXYGEN SATURATION: 98 % | HEIGHT: 77 IN | BODY MASS INDEX: 20.62 KG/M2 | DIASTOLIC BLOOD PRESSURE: 72 MMHG | RESPIRATION RATE: 18 BRPM | SYSTOLIC BLOOD PRESSURE: 144 MMHG | HEART RATE: 68 BPM | WEIGHT: 174.6 LBS | TEMPERATURE: 98 F

## 2018-02-18 LAB
ANION GAP SERPL CALCULATED.3IONS-SCNC: 3 MMOL/L (ref 4–13)
BUN SERPL-MCNC: 18 MG/DL (ref 5–25)
CALCIUM SERPL-MCNC: 8.9 MG/DL (ref 8.3–10.1)
CHLORIDE SERPL-SCNC: 104 MMOL/L (ref 100–108)
CO2 SERPL-SCNC: 32 MMOL/L (ref 21–32)
CREAT SERPL-MCNC: 1.17 MG/DL (ref 0.6–1.3)
ERYTHROCYTE [DISTWIDTH] IN BLOOD BY AUTOMATED COUNT: 14.2 % (ref 11.6–15.1)
GFR SERPL CREATININE-BSD FRML MDRD: 61 ML/MIN/1.73SQ M
GLUCOSE SERPL-MCNC: 109 MG/DL (ref 65–140)
HCT VFR BLD AUTO: 36 % (ref 42–52)
HGB BLD-MCNC: 12.1 G/DL (ref 14–18)
MAGNESIUM SERPL-MCNC: 2 MG/DL (ref 1.6–2.6)
MCH RBC QN AUTO: 32.6 PG (ref 27–31)
MCHC RBC AUTO-ENTMCNC: 33.7 G/DL (ref 31.4–37.4)
MCV RBC AUTO: 97 FL (ref 82–98)
PHOSPHATE SERPL-MCNC: 3.7 MG/DL (ref 2.3–4.1)
PLATELET # BLD AUTO: 172 THOUSANDS/UL (ref 130–400)
PMV BLD AUTO: 6.1 FL (ref 8.9–12.7)
POTASSIUM SERPL-SCNC: 4.3 MMOL/L (ref 3.5–5.3)
RBC # BLD AUTO: 3.72 MILLION/UL (ref 4.7–6.1)
SODIUM SERPL-SCNC: 139 MMOL/L (ref 136–145)
WBC # BLD AUTO: 5.9 THOUSAND/UL (ref 4.8–10.8)

## 2018-02-18 PROCEDURE — 83735 ASSAY OF MAGNESIUM: CPT | Performed by: STUDENT IN AN ORGANIZED HEALTH CARE EDUCATION/TRAINING PROGRAM

## 2018-02-18 PROCEDURE — 94640 AIRWAY INHALATION TREATMENT: CPT

## 2018-02-18 PROCEDURE — 97163 PT EVAL HIGH COMPLEX 45 MIN: CPT

## 2018-02-18 PROCEDURE — G8979 MOBILITY GOAL STATUS: HCPCS

## 2018-02-18 PROCEDURE — 80048 BASIC METABOLIC PNL TOTAL CA: CPT | Performed by: STUDENT IN AN ORGANIZED HEALTH CARE EDUCATION/TRAINING PROGRAM

## 2018-02-18 PROCEDURE — G8978 MOBILITY CURRENT STATUS: HCPCS

## 2018-02-18 PROCEDURE — 97110 THERAPEUTIC EXERCISES: CPT

## 2018-02-18 PROCEDURE — 84100 ASSAY OF PHOSPHORUS: CPT | Performed by: STUDENT IN AN ORGANIZED HEALTH CARE EDUCATION/TRAINING PROGRAM

## 2018-02-18 PROCEDURE — 85027 COMPLETE CBC AUTOMATED: CPT | Performed by: STUDENT IN AN ORGANIZED HEALTH CARE EDUCATION/TRAINING PROGRAM

## 2018-02-18 PROCEDURE — 94760 N-INVAS EAR/PLS OXIMETRY 1: CPT

## 2018-02-18 RX ADMIN — ENOXAPARIN SODIUM 80 MG: 80 INJECTION SUBCUTANEOUS at 09:46

## 2018-02-18 RX ADMIN — IPRATROPIUM BROMIDE AND ALBUTEROL SULFATE 3 ML: .5; 3 SOLUTION RESPIRATORY (INHALATION) at 02:56

## 2018-02-18 RX ADMIN — FLUTICASONE PROPIONATE AND SALMETEROL 1 PUFF: 50; 250 POWDER RESPIRATORY (INHALATION) at 09:46

## 2018-02-18 RX ADMIN — PANTOPRAZOLE SODIUM 40 MG: 40 TABLET, DELAYED RELEASE ORAL at 05:37

## 2018-02-18 RX ADMIN — IPRATROPIUM BROMIDE AND ALBUTEROL SULFATE 3 ML: .5; 3 SOLUTION RESPIRATORY (INHALATION) at 08:38

## 2018-02-18 RX ADMIN — AMLODIPINE BESYLATE 5 MG: 5 TABLET ORAL at 09:46

## 2018-02-18 RX ADMIN — MECLIZINE HCL 12.5 MG: 12.5 TABLET ORAL at 09:46

## 2018-02-18 RX ADMIN — FLUTICASONE PROPIONATE 1 SPRAY: 50 SPRAY, METERED NASAL at 09:47

## 2018-02-18 NOTE — PLAN OF CARE
CARDIOVASCULAR - ADULT     Maintains optimal cardiac output and hemodynamic stability Progressing     Absence of cardiac dysrhythmias or at baseline rhythm Progressing        DISCHARGE PLANNING     Discharge to home or other facility with appropriate resources Progressing        DISCHARGE PLANNING - CARE MANAGEMENT     Discharge to post-acute care or home with appropriate resources Progressing        INFECTION - ADULT     Absence or prevention of progression during hospitalization Progressing        Knowledge Deficit     Patient/family/caregiver demonstrates understanding of disease process, treatment plan, medications, and discharge instructions Progressing        PAIN - ADULT     Verbalizes/displays adequate comfort level or baseline comfort level Progressing        Potential for Falls     Patient will remain free of falls Progressing        RESPIRATORY - ADULT     Achieves optimal ventilation and oxygenation Progressing        SAFETY ADULT     Maintain or return to baseline ADL function Progressing     Maintain or return mobility status to optimal level Progressing        SKIN/TISSUE INTEGRITY - ADULT     Skin integrity remains intact Progressing

## 2018-02-18 NOTE — PHYSICAL THERAPY NOTE
PT EVALUATION       02/18/18 3229   Note Type   Note type Eval/Treat   Pain Assessment   Pain Assessment No/denies pain   Home Living   Type of [address] One level  (2 TEMI)   Home Equipment Cane  (12)   Prior Function   Level of [place] Independent with ADLs and functional mobility   Lives With Alone   Receives Help From Family; [address] in the last 6 months 0   Restrictions/Precautions   Other Precautions O2;Fall Risk   General   Additional Pertinent History Pt admitted with progressive SOB and loss of endurance  Pt with diagnosis of PE  Cognition   Overall Cognitive Status WFL   RLE Assessment   RLE Assessment WFL   LLE Assessment   LLE Assessment WFL   Bed Mobility   Supine to Sit 7  Independent   Sit to Supine 7  Independent   Transfers   Sit to Stand 7  Independent   Stand to Sit 7  Independent   Stand pivot 7  Independent   Ambulation/Elevation   Gait pattern Excessively slow; Step to   Gait Assistance 5  Supervision   Assistive Device (none/2L02)   Distance 50 feet   Stair Management Assistance (Sp02 95%  bpm after activity; +dyspnea)   Balance   Static Sitting Good   Dynamic Sitting Fair   Static Standing Fair   Dynamic Standing Fair   Activity Tolerance   Activity Tolerance Patient limited by fatigue   Assessment   Prognosis Good   Problem List Decreased strength;Decreased endurance; Impaired balance;Decreased mobility   Assessment Patient seen for Physical Therapy evaluation  Patient admitted with Acute pulmonary embolism ([address] )  Comorbidities affecting patient's physical performance include: COPD, htn, lung cancer  Personal factors affecting patient at time of initial evaluation include: stairs to enter home and inability to ambulate household distances  Prior to admission, patient was independent with functional mobility without assistive device    Please find objective findings from Physical Therapy assessment regarding body systems outlined above with impairments and limitations including weakness, decreased endurance, gait deviations, decreased activity tolerance, decreased functional mobility tolerance, fall risk and SOB upon exertion  The Barthel Index was used as a functional outcome tool presenting with a score of 75 today indicating moderate limitations of functional mobility and ADLS  Patient's clinical presentation is currently unstable/unpredictable as seen in patient's presentation of increased fall risk, new onset of impairment of functional mobility, decreased endurance and new onset of weakness  Pt would benefit from continued Physical Therapy treatment to address deficits as defined above and maximize level of functional mobility  As demonstrated by objective findings, the assigned level of complexity for this evaluation is high  Goals   Patient Goals to home, breathe better, get stronger   STG Expiration Date (1-7 days)   Short Term Goal #1 independent ambulation 100 feet indoor surfaces with minimal SOB, improve standing static balance to good, pt will go up and down 4 steps with minimal SOB independently   LTG Expiration Date (1-2 weeks)   Long Term Goal #1 pt will ambulate with a normal gait 200 feet without SOB outdoor surfaces, improve standing activity tolerance to 10 minutes   Plan   Treatment/Interventions ADL retraining;Functional transfer training;LE strengthening/ROM; Elevations; Therapeutic exercise; Endurance training;Patient/family training;Equipment eval/education;Gait training   PT Frequency (3-5x/week)   Recommendation   Recommendation (follow up with pulmonary rehab when appropriate)   Equipment Recommended (rolling walker, CM informed)   Barthel Index   Feeding 10   Bathing 0   Grooming Score 5   Dressing Score 10   Bladder Score 10   Bowels Score 10   Toilet Use Score 10   Transfers (Bed/Chair) Score 15   Mobility (Level Surface) Score 0   Stairs Score 5   Barthel Index Score 75       Time In:0910  Time OCH:0186  Total Time:       S:  "I think I'd like a walker for home"  O:  Pt ambulated with rolling walker 75 feet with supervision/modified independent  A:  Quality of gait and endurance improved with use of walker  P: Encourage progressive increase in activity with use of walker for safety at home      Bhavana Delgadillo, PT

## 2018-02-18 NOTE — NURSING NOTE
Pt given and explained follow up care  Additional education given for 1 new prescription  IV removed  Pt  Left in wheelchair, with rolling walker, belongings, and  daughters at side

## 2018-02-18 NOTE — SOCIAL WORK
MITUL CALLED TO BRING PT ROLLING WALKER RECOMMENDED BY PT/OT CAITLIN CRAIG REFERRAL PLACED FOR THIS MEASURE  PT ALSO IN NEED OF ELIQUIS FOR HOME USE  PT NOTED THAT HIS PHARMACY IN [facility] IN [facility], THEY ARE NOT OPEN YET  WILL CALL BACK IN 5MINS  CM SPOKE WITH PHARMACISTKAMINI  CALLED IN THE SCRIPT COST TO PT IS $22   THE PHARMACY IS OPEN UNTIL 2PM   PT UNSURE IF DTR WILL BE HERE PRIOR TO GOING FOR HIS MEDICATION, 30DAY FREE CARD PROVIDED IF PT ABLE TO DC PRIOR TO PHARMACY CLOSING  REFERRAL PLACED THROUGH [facility] AND [facility] DELIVERED TO THE PT ROOM

## 2018-02-18 NOTE — DISCHARGE SUMMARY
[facility] Discharge Summary - Medical John Bertrand 75 y o  male MRN: [id number]  [address]  [facility] Bed: [address] [facility] Encounter: [id number]    [address] [facility]    Admitting Provider: Sal Brandt DO  Discharge Provider: Jak Maza MD    Discharge To: Home  Facility: None    Outpatient Follow-Up:   Follow-Up with [facility] with Dr Harvey Díaz in 1 week  Follow-Up with Pulmonology with Dr Katrin Call in 6 weeks    Things to address at first follow up visit:  Completion of Steroid Taper  Pulmonary Nodules    Labs and results pending at discharge:  None    Admission Date: 2/14/2018          Discharge Date: 2/18/18    Primary Discharge Diagnosis  Principal Problem:    Acute pulmonary embolism ([facility] ) left-sided  Active Problems:    AAT (alpha-1-antitrypsin) deficiency ([facility] )    Moderate COPD (chronic obstructive pulmonary disease) ([facility] )    Gastroesophageal reflux disease without esophagitis    HTN (hypertension)    BPH (benign prostatic hyperplasia)    Lung nodules    Former smoker    Lung neoplasm  Resolved Problems:    * No resolved hospital problems  *      Secondary Discharge Diagnoses:  None    Consulting Providers:  Pulmonology  Hematology/Oncology        [address] [place] [address]  (as per H&P)  Katelyn Valadezden 76-year-old male past medical history COPD, alpha-1 antitrypsin deficiency, hypertension, and GERD who presents with shortness of breath worsening over last couple of days  Was unable to perform exercises at pulmonary rehab, and became acutely shortness of breath  Has been experiencing centralized chest pain for many weeks  Patient describes it as a burning sensation similar to heartburn  No nausea, vomiting, lightheadedness, or dizziness  Follows with Dr Maurice Reid to outpatient for workup of pulmonary nodules  No recent weight loss, however has a decreased appetite   ED: duoneb x 2, Solumedrol 125mg    Hospital Course:    Shortness of breath: 2/2 Pulmonary Embolism  CTA chest:  Acute PE in distal left main pulmonary artery and proximal left lower lobe segmental branches   Stable right upper and left lower pulmonary nodules  Chest x-ray:  COPD, right upper lobe nodule density, no acute infiltrates  Venous duplex b/l LE study: Right- negative  Left- subacute to chronic non occlusive DVT of the popliteal vein  Started on Lovenox 1 mg/kg bid  Discharged on Eliquis         Neoplasm of uncertain behavior of trachea, bronchus, and lung: PET CT:  Hypermetabolic nodules  CTA chest - right upper and left lower lobes measuring 1 2 x 1 3 centimeter   Tissue sampling of right upper lobe nodule is planned   Three-month follow-up of left lower lobe nodule as previously recommended  Continue to follow-up with Dr Karel Santiago as outpatient  Follow-up with pulmonology in 6 weeks     Alpha 1 Antitrypsin Deficiency: Hematology/Oncology consulted  Ordered an AFP to look for extent to the Liver  AFP result was 4 8  Pulmonology consulted and asked to follow-up in 6 weeks upon discharge  Continue with weekly Zemaira       COPD: Stable on and throughout admission  Patient was continued on home medication - Advair and Spiriva with continuation od Duoneb q6h    Hypertension: Stable on and throughout admission  Patient was continued on Amlodipine 5mg qd    GERD: Stable on and throughout admission  Patient was continued on Protonix 40mg PO qd    BPH: Stable on and throughout admission  Patient was continued on home medication - Flomax 0 4mg PO qhs    Insomnia: Stable on and throughout admission  Patient was continued on home medication - Ambien 10mg PO qdbedtime prn    Physical Exam at Discharge  General appearance: alert and oriented, in no acute distress  Head: Normocephalic, without obvious abnormality, atraumatic  Eyes: conjunctivae/corneas clear  PERRL, EOM's intact  Fundi benign    Throat: lips, mucosa, and tongue normal; teeth and gums normal  Lungs: clear to auscultation bilaterally  Heart: regular rate and rhythm, S1, S2 normal, no murmur, click, rub or gallop  Abdomen: soft, non-tender; bowel sounds normal; no masses,  no organomegaly  Extremities: extremities normal, warm and well-perfused; no cyanosis, clubbing, or edema  Pulses: 2+ and symmetric  Lymph nodes: Cervical, supraclavicular, and axillary nodes normal     Procedures Performed/Pertinent Test results  Results for orders placed or performed during the hospital encounter of 02/14/18   MRSA culture   Result Value Ref Range    MRSA Culture Only       No Methicillin Resistant Staphlyococcus aureus (MRSA) isolated   Clostridium difficile toxin by PCR   Result Value Ref Range    C difficile toxin by PCR NEGATIVE for C difficle toxin by PCR  NEGATIVE for C difficle toxin by PCR      CBC and differential   Result Value Ref Range    WBC 7 70 4 80 - 10 80 Thousand/uL    RBC 4 22 (L) 4 70 - 6 10 Million/uL    Hemoglobin 13 7 (L) 14 0 - 18 0 g/dL    Hematocrit 40 2 (L) 42 0 - 52 0 %    MCV 95 82 - 98 fL    MCH 32 5 (H) 27 0 - 31 0 pg    MCHC 34 1 31 4 - 37 4 g/dL    RDW 14 3 11 6 - 15 1 %    MPV 5 9 (L) 8 9 - 12 7 fL    Platelets 230 991 - 178 Thousands/uL    nRBC 0 /100 WBCs    Neutrophils Relative 76 (H) 43 - 75 %    Lymphocytes Relative 16 14 - 44 %    Monocytes Relative 7 4 - 12 %    Eosinophils Relative 1 0 - 6 %    Basophils Relative 0 0 - 1 %    Neutrophils Absolute 5 80 1 85 - 7 62 Thousands/µL    Lymphocytes Absolute 1 20 0 60 - 4 47 Thousands/µL    Monocytes Absolute 0 60 0 17 - 1 22 Thousand/µL    Eosinophils Absolute 0 10 0 00 - 0 61 Thousand/µL    Basophils Absolute 0 00 0 00 - 0 10 Thousands/µL   Comprehensive metabolic panel   Result Value Ref Range    Sodium 141 136 - 145 mmol/L    Potassium 3 5 3 5 - 5 3 mmol/L    Chloride 103 100 - 108 mmol/L    CO2 26 21 - 32 mmol/L    Anion Gap 12 4 - 13 mmol/L    BUN 12 5 - 25 mg/dL    Creatinine 1 08 0 60 - 1 30 mg/dL    Glucose 104 65 - 140 mg/dL    Calcium 9 4 8 3 - 10 1 mg/dL    AST 22 5 - 45 U/L    ALT 38 12 - 78 U/L    Alkaline Phosphatase 56 46 - 116 U/L    Total Protein 7 1 6 4 - 8 2 g/dL    Albumin 3 9 3 5 - 5 0 g/dL    Total Bilirubin 1 20 (H) 0 20 - 1 00 mg/dL    eGFR 67 ml/min/1 73sq m   Troponin I   Result Value Ref Range    Troponin I <0 02 <=0 04 ng/mL   Protime-INR   Result Value Ref Range    Protime 11 1 9 4 - 11 7 seconds    INR 1 06 0 86 - 1 16   APTT   Result Value Ref Range    PTT 25 23 - 35 seconds   Comprehensive metabolic panel   Result Value Ref Range    Sodium 139 136 - 145 mmol/L    Potassium 4 0 3 5 - 5 3 mmol/L    Chloride 103 100 - 108 mmol/L    CO2 26 21 - 32 mmol/L    Anion Gap 10 4 - 13 mmol/L    BUN 13 5 - 25 mg/dL    Creatinine 1 12 0 60 - 1 30 mg/dL    Glucose 141 (H) 65 - 140 mg/dL    Calcium 9 1 8 3 - 10 1 mg/dL    AST 15 5 - 45 U/L    ALT 32 12 - 78 U/L    Alkaline Phosphatase 45 (L) 46 - 116 U/L    Total Protein 6 5 6 4 - 8 2 g/dL    Albumin 3 3 (L) 3 5 - 5 0 g/dL    Total Bilirubin 1 00 0 20 - 1 00 mg/dL    eGFR 64 ml/min/1 73sq m   Magnesium   Result Value Ref Range    Magnesium 1 7 1 6 - 2 6 mg/dL   Phosphorus   Result Value Ref Range    Phosphorus 2 8 2 3 - 4 1 mg/dL   CBC (With Platelets)   Result Value Ref Range    WBC 6 30 4 80 - 10 80 Thousand/uL    RBC 3 99 (L) 4 70 - 6 10 Million/uL    Hemoglobin 13 0 (L) 14 0 - 18 0 g/dL    Hematocrit 38 0 (L) 42 0 - 52 0 %    MCV 95 82 - 98 fL    MCH 32 7 (H) 27 0 - 31 0 pg    MCHC 34 3 31 4 - 37 4 g/dL    RDW 14 0 11 6 - 15 1 %    Platelets 292 213 - 331 Thousands/uL    MPV 6 2 (L) 8 9 - 12 7 fL   CBC   Result Value Ref Range    WBC 8 70 4 80 - 10 80 Thousand/uL    RBC 3 67 (L) 4 70 - 6 10 Million/uL    Hemoglobin 11 9 (L) 14 0 - 18 0 g/dL    Hematocrit 35 4 (L) 42 0 - 52 0 %    MCV 96 82 - 98 fL    MCH 32 4 (H) 27 0 - 31 0 pg    MCHC 33 6 31 4 - 37 4 g/dL    RDW 14 3 11 6 - 15 1 %    Platelets 019 996 - 331 Thousands/uL    MPV 6 0 (L) 8 9 - 12 7 fL   Basic metabolic panel   Result Value Ref Range Sodium 139 136 - 145 mmol/L    Potassium 3 7 3 5 - 5 3 mmol/L    Chloride 105 100 - 108 mmol/L    CO2 30 21 - 32 mmol/L    Anion Gap 4 4 - 13 mmol/L    BUN 19 5 - 25 mg/dL    Creatinine 1 09 0 60 - 1 30 mg/dL    Glucose 108 65 - 140 mg/dL    Calcium 9 0 8 3 - 10 1 mg/dL    eGFR 67 ml/min/1 73sq m   Magnesium   Result Value Ref Range    Magnesium 1 8 1 6 - 2 6 mg/dL   Phosphorus   Result Value Ref Range    Phosphorus 3 8 2 3 - 4 1 mg/dL   AFP tumor marker   Result Value Ref Range    AFP TUMOR MARKER 4 8 0 5 - 8 ng/mL   Basic metabolic panel   Result Value Ref Range    Sodium 139 136 - 145 mmol/L    Potassium 4 1 3 5 - 5 3 mmol/L    Chloride 100 100 - 108 mmol/L    CO2 31 21 - 32 mmol/L    Anion Gap 8 4 - 13 mmol/L    BUN 18 5 - 25 mg/dL    Creatinine 1 14 0 60 - 1 30 mg/dL    Glucose 86 65 - 140 mg/dL    Calcium 9 0 8 3 - 10 1 mg/dL    eGFR 63 ml/min/1 73sq m   CBC   Result Value Ref Range    WBC 5 90 4 80 - 10 80 Thousand/uL    RBC 3 90 (L) 4 70 - 6 10 Million/uL    Hemoglobin 12 6 (L) 14 0 - 18 0 g/dL    Hematocrit 37 9 (L) 42 0 - 52 0 %    MCV 97 82 - 98 fL    MCH 32 4 (H) 27 0 - 31 0 pg    MCHC 33 4 31 4 - 37 4 g/dL    RDW 14 3 11 6 - 15 1 %    Platelets 489 193 - 980 Thousands/uL    MPV 6 3 (L) 8 9 - 12 7 fL   Magnesium   Result Value Ref Range    Magnesium 1 9 1 6 - 2 6 mg/dL   Phosphorus   Result Value Ref Range    Phosphorus 3 8 2 3 - 4 1 mg/dL   CBC   Result Value Ref Range    WBC 5 90 4 80 - 10 80 Thousand/uL    RBC 3 72 (L) 4 70 - 6 10 Million/uL    Hemoglobin 12 1 (L) 14 0 - 18 0 g/dL    Hematocrit 36 0 (L) 42 0 - 52 0 %    MCV 97 82 - 98 fL    MCH 32 6 (H) 27 0 - 31 0 pg    MCHC 33 7 31 4 - 37 4 g/dL    RDW 14 2 11 6 - 15 1 %    Platelets 673 028 - 547 Thousands/uL    MPV 6 1 (L) 8 9 - 12 7 fL   Basic metabolic panel   Result Value Ref Range    Sodium 139 136 - 145 mmol/L    Potassium 4 3 3 5 - 5 3 mmol/L    Chloride 104 100 - 108 mmol/L    CO2 32 21 - 32 mmol/L    Anion Gap 3 (L) 4 - 13 mmol/L    BUN 18 5 - 25 mg/dL    Creatinine 1 17 0 60 - 1 30 mg/dL    Glucose 109 65 - 140 mg/dL    Calcium 8 9 8 3 - 10 1 mg/dL    eGFR 61 ml/min/1 73sq m   Magnesium   Result Value Ref Range    Magnesium 2 0 1 6 - 2 6 mg/dL   Phosphorus   Result Value Ref Range    Phosphorus 3 7 2 3 - 4 1 mg/dL   ECG 12 lead   Result Value Ref Range    Ventricular Rate 73 BPM    Atrial Rate 73 BPM    MT Interval 140 ms    QRSD Interval 102 ms    QT Interval 424 ms    QTC Interval 467 ms    P Axis -3 degrees    QRS Axis 17 degrees    T Wave Axis 4 degrees     VAS lower limb venous duplex study, complete bilateral   Final Result      CTA ED chest PE study   Final Result         1  Acute pulmonary embolus in the distal left main pulmonary artery and proximal left lower lobe segmental branches  2   Moderate panlobular emphysema  3   Stable right upper and left lower pulmonary nodules measuring 1 2 x 1 3 cm  PET/CT was performed, based on current Fleischner Society 2017 Guidelines on incidental pulmonary nodule  Tissue sampling of the right upper lobe hypermetabolic nodule is    planned  3 month follow-up of left lower lobe pulmonary nodule as previously recommended  I personally discussed this study with Dr Corby Kelly on 2/14/2018 at 5:34 PM                      Workstation performed: [id number]         XR chest 1 view portable   Final Result      COPD  Right upper lobe nodular density  See previous CT recommendations  No acute infiltrates        Workstation performed: [id number]           Medications     Your Medications   Your Medications     Morning Afternoon Evening Bedtime As Needed    ADVAIR DISKUS 250-50 mcg/dose inhaler   Generic drug: fluticasone-salmeterol   Inhale   Refills: 0           * albuterol (2 5 mg/3 mL) 0 083 % nebulizer solution   Inhale 1 each every 4 (four) hours as needed   Refills: 0           * albuterol 90 mcg/act inhaler   Commonly known as: PROVENTIL HFA,VENTOLIN HFA   Inhale 2 puffs every 6 (six) hours as needed for wheezing   Refills: 0   Doctor's comments: Please substitute based on patient insurance           amLODIPine 5 mg tablet   Commonly known as: NORVASC   Take 1 tablet (5 mg total) by mouth every morning   Refills: 5           apixaban 5 mg   Commonly known as: ELIQUIS   Take 1 tablet (5 mg total) by mouth 2 (two) times a day   Refills: 0            fluticasone 50 mcg/act nasal spray   Commonly known as: FLONASE   Refills: 0           meclizine 12 5 MG tablet   Commonly known as: ANTIVERT   Take 1 tablet by mouth daily   Refills: 0           omeprazole 40 MG capsule   Commonly known as: PriLOSEC   Refills: 0           tamsulosin 0 4 mg   Commonly known as: FLOMAX   Take 0 4 mg by mouth daily at bedtime   Refills: 0           * ZEMAIRA IV   Infuse into a venous catheter once a week On tuesday -1000mg /50 ml   Refills: 0   Tuesdays only         * ZEMAIRA injection   Generic drug: alpha1-proteinase inhibitor   Refills: 0          zolpidem 6 25 MG CR tablet   Commonly known as: AMBIEN CR   Take 10 mg by mouth daily at bedtime as needed for sleep   Refills: 0           * This list has 4 medication(s) that are the same as other medications prescribed for you  Read the directions carefully, and ask your doctor or other care provider to review them with you  Allergies  Allergies   Allergen Reactions    Chantix [Varenicline]      Caused prostate infection       Diet restrictions: none  Activity restrictions: As Tolerated  Code Status: Level 1 - Full Code    Discharge Condition: good      Discharge  Statement   I spent 30 minutes discharging the patient  This time was spent on the day of discharge  I had direct contact with the patient on the day of discharge  Additional documentation is required if more than 30 minutes were spent on discharge         Lawrence Noriega MD

## 2018-02-18 NOTE — DISCHARGE INSTRUCTIONS
Pulmonary Embolism   WHAT YOU NEED TO KNOW:   A pulmonary embolism (PE) is the sudden blockage of a blood vessel in the lungs by an embolus  An embolus is a small piece of blood clot, fat, air, or tumor cells  The embolus cuts off the blood supply to your lungs  A pulmonary embolism can become life-threatening  DISCHARGE INSTRUCTIONS:   Call 911 for any of the following:   · You feel lightheaded, short of breath, and have chest pain  · You cough up blood  · You have a seizure  · You have slurred speech, increased sleepiness, or problems seeing, talking, or thinking  · You have weakness or cannot move your arm or leg on one side of your body  Seek care immediately if:   · You feel faint  · You have a severe headache  · Your heart is beating faster than normal   Contact your healthcare provider if:   · The skin on any part of your legs or hips turns purple  · Your gums or nose bleed  · You see blood in your urine or bowel movements  · Your bowel movements are black or darker than normal     · You have questions or concerns about your condition or care  Medicines:   · Blood thinners  help treat the PE and prevent new clots from forming  Examples of blood thinners include heparin, rivaroxaban, apixiban, and warfarin  The following are general safety guidelines to follow while you are taking a blood thinner:     ¨ Watch for bleeding and bruising  Watch for bleeding from your gums or nose  Watch for blood in your urine and bowel movements  Use a soft washcloth on your skin, and a soft toothbrush to brush your teeth  This can keep your skin and gums from bleeding  If you shave, use an electric shaver  Do not play contact sports  ¨ Tell your dentist and other healthcare providers that you take a blood thinner  Wear a bracelet or necklace that says you take this medicine  ¨ Do not start or stop any medicines unless your healthcare provider tells you to   Many medicines cannot be used with blood thinners  ¨ Tell your healthcare provider right away if you forget to take the blood thinner , or if you take too much  ¨ Warfarin  is a blood thinner that you may need to take  The following are additional things you should be aware of if you take warfarin:    § Foods and medicines can affect the amount of warfarin in your blood  Do not make major changes to your diet  Warfarin works best when you eat about the same amount of vitamin K every day  Vitamin K is found in green leafy vegetables and certain other foods  Ask for more information about what to eat or not to eat  § You will need to see your healthcare provider for follow-up visits  You will need regular blood tests to decide how much warfarin you need  · Take your medicine as directed  Contact your healthcare provider if you think your medicine is not helping or if you have side effects  Tell him or her if you are allergic to any medicine  Keep a list of the medicines, vitamins, and herbs you take  Include the amounts, and when and why you take them  Bring the list or the pill bottles to follow-up visits  Carry your medicine list with you in case of an emergency  Prevent another PE:   · Wear pressure stockings  The stockings are tight and put pressure on your legs  This improves blood flow and helps prevent clots  Wear the stockings during the day  Do not wear them when you sleep  · Exercise regularly  Ask about the best exercise plan for you  When you travel by car or work at a desk, take breaks to stand up and move around as much as possible  Rotate your feet in circles often if you sit for a long period of time  · Maintain a healthy weight  Ask your healthcare provider how much you should weigh  Ask him to help you create a weight loss plan if you are overweight  · Do not smoke  Nicotine and other chemicals in cigarettes and cigars can damage blood vessels and increase your risk for another PE   Ask your healthcare provider for information if you currently smoke and need help to quit  E-cigarettes or smokeless tobacco still contain nicotine  Talk to your healthcare provider before you use these products  Follow up with your healthcare provider as directed:  Write down your questions so you remember to ask them during your visits  © 2017 [employer] Information is for End User's use only and may not be sold, redistributed or otherwise used for commercial purposes  All illustrations and images included in CareNotes® are the copyrighted property of [employer] , [employer]  or Tyrone Walsh  The above information is an  only  It is not intended as medical advice for individual conditions or treatments  Talk to your doctor, nurse or pharmacist before following any medical regimen to see if it is safe and effective for you

## 2018-02-19 ENCOUNTER — TRANSITIONAL CARE MANAGEMENT (OUTPATIENT)
Dept: FAMILY MEDICINE CLINIC | Facility: CLINIC | Age: 73
End: 2018-02-19

## 2018-02-19 ENCOUNTER — APPOINTMENT (OUTPATIENT)
Dept: PULMONOLOGY | Facility: CLINIC | Age: 73
End: 2018-02-19
Payer: MEDICARE

## 2018-02-21 ENCOUNTER — APPOINTMENT (OUTPATIENT)
Dept: PULMONOLOGY | Facility: CLINIC | Age: 73
End: 2018-02-21
Payer: MEDICARE

## 2018-02-23 ENCOUNTER — CLINICAL SUPPORT (OUTPATIENT)
Dept: PULMONOLOGY | Facility: CLINIC | Age: 73
End: 2018-02-23
Payer: MEDICARE

## 2018-02-23 ENCOUNTER — OFFICE VISIT (OUTPATIENT)
Dept: FAMILY MEDICINE CLINIC | Facility: CLINIC | Age: 73
End: 2018-02-23
Payer: MEDICARE

## 2018-02-23 VITALS
HEIGHT: 77 IN | DIASTOLIC BLOOD PRESSURE: 64 MMHG | HEART RATE: 98 BPM | SYSTOLIC BLOOD PRESSURE: 138 MMHG | TEMPERATURE: 97.4 F | WEIGHT: 178.06 LBS | OXYGEN SATURATION: 97 % | BODY MASS INDEX: 21.03 KG/M2 | RESPIRATION RATE: 20 BRPM

## 2018-02-23 DIAGNOSIS — Z09 HOSPITAL DISCHARGE FOLLOW-UP: Primary | ICD-10-CM

## 2018-02-23 DIAGNOSIS — J44.9 MODERATE COPD (CHRONIC OBSTRUCTIVE PULMONARY DISEASE) (HCC): ICD-10-CM

## 2018-02-23 PROCEDURE — 99214 OFFICE O/P EST MOD 30 MIN: CPT | Performed by: FAMILY MEDICINE

## 2018-02-23 PROCEDURE — G0424 PULMONARY REHAB W EXER: HCPCS

## 2018-02-23 NOTE — ASSESSMENT & PLAN NOTE
Follow-Up Medical Conditions s/p Hospital Discharge  #1: Pulmonary Embolism: Continue Eliquis 5mg PO bid  #2: Alpha 1 Anti-Trypsin Deficiency: Continue follow-up with Hematology/Oncology on 2/26/18  #3: Pulmonology Nodules: Continue follow-up with Pulmonology in April for repeat PET/CT   *Continue with Pulmonary Rehab*

## 2018-02-23 NOTE — PROGRESS NOTES
Assessment/Plan:    Hospital discharge follow-up  Follow-Up Medical Conditions s/p Hospital Discharge  #1: Pulmonary Embolism: Continue Eliquis 5mg PO bid  #2: Alpha 1 Anti-Trypsin Deficiency: Continue follow-up with Hematology/Oncology on 2/26/18  #3: Pulmonology Nodules: Continue follow-up with Pulmonology in April for repeat PET/CT   *Continue with Pulmonary Rehab*      Diagnoses for this visit:  #1: Hospital discharge follow-up      Subjective:      Patient ID: Aislinn Crowder is a 75 y o  male  HPI   Castaneda Friends is a 75year old male with a PMH of COPD and A1AT Deficiency presents to clinic after hospital discharge on 2/18/18  Admitted on 2/14/18 for sudden onset shortness of breath and was found to have a pulmonary embolism  Incidental findings included 2 pulmonary nodules in both left and right upper lobes  Right Nodule showed concern for hypermetabolism, while Left Nodule was stable on PET/CT scans obtained on 2/12/18 with no concern for hypermetabolic metastases  It was recommended at discharge to follow up with Pulmonology in 6-8 weeks to repeat PET/CT  Given history of A1AT Deficiency, patient was advised to follow up with Hematology/Oncology  Patient still on [address] [place] 1x weekly for A1AT Deficiency and Duonebs and Advair for COPD  The following portions of the patient's history were reviewed and updated as appropriate: allergies, current medications, past family history, past medical history, past social history, past surgical history and problem list     Review of Systems   Constitutional: Negative for activity change, chills, diaphoresis, fatigue and fever  HENT: Negative for ear discharge, ear pain, postnasal drip, rhinorrhea, sinus pain, sinus pressure, sneezing, sore throat, tinnitus and trouble swallowing  Eyes: Negative for photophobia and visual disturbance     Respiratory: Negative for apnea, cough, choking, chest tightness, shortness of breath, wheezing and stridor  Cardiovascular: Negative for chest pain, palpitations and leg swelling  Gastrointestinal: Negative for abdominal pain, constipation, diarrhea, nausea and vomiting  Genitourinary: Negative for decreased urine volume, difficulty urinating, frequency, hematuria and urgency  Musculoskeletal: Negative for arthralgias, back pain, gait problem, joint swelling and myalgias  Skin: Negative for rash and wound  Neurological: Negative for dizziness, tremors, syncope, weakness, numbness and headaches  Hematological: Negative for adenopathy  Does not bruise/bleed easily  Objective:    /64 (BP Location: Left arm, Patient Position: Sitting)   Pulse 98   Temp (!) 97 4 °F (36 3 °C) (Tympanic)   Resp 20   Ht 6' 5" (1 956 m)   Wt 80 8 kg (178 lb 1 oz)   SpO2 97% Comment: on 3L O2 by nasal cannula  BMI 21 12 kg/m²     Physical Exam   Constitutional: He is oriented to person, place, and time  He appears well-developed and well-nourished  No distress  HENT:   Head: Normocephalic and atraumatic  Eyes: Conjunctivae are normal  Pupils are equal, round, and reactive to light  Neck: Normal range of motion  Neck supple  No JVD present  No tracheal deviation present  No thyromegaly present  Cardiovascular: Normal rate, regular rhythm, normal heart sounds and intact distal pulses  Exam reveals no gallop and no friction rub  No murmur heard  Pulmonary/Chest: Effort normal and breath sounds normal  No stridor  No respiratory distress  He has no wheezes  He has no rales  He exhibits no tenderness  Abdominal: Soft  Bowel sounds are normal  He exhibits no distension and no mass  There is no tenderness  There is no rebound and no guarding  Musculoskeletal: Normal range of motion  He exhibits no edema, tenderness or deformity  Lymphadenopathy:     He has no cervical adenopathy  Neurological: He is alert and oriented to person, place, and time  No cranial nerve deficit     Skin: No rash noted  He is not diaphoretic  No pallor  Psychiatric: He has a normal mood and affect

## 2018-02-26 ENCOUNTER — CLINICAL SUPPORT (OUTPATIENT)
Dept: PULMONOLOGY | Facility: CLINIC | Age: 73
End: 2018-02-26
Payer: MEDICARE

## 2018-02-26 DIAGNOSIS — J44.9 MODERATE COPD (CHRONIC OBSTRUCTIVE PULMONARY DISEASE) (HCC): ICD-10-CM

## 2018-02-26 PROCEDURE — G0424 PULMONARY REHAB W EXER: HCPCS

## 2018-02-26 NOTE — MISCELLANEOUS
Assessment    1  Insomnia (780 52) (G47 00)   2  Chronic obstructive pulmonary disease (496) (J44 9)   3  Alpha-1-antitrypsin deficiency (273 4) (E88 01)   4  Gastroesophageal reflux disease without esophagitis (530 81) (K21 9)   5  Balance problem (781 99) (R26 89)   6  Essential hypertension (401 9) (I10)   7  Body mass index (BMI) 21 0-21 9, adult (V85 1) (Z68 21)    Plan  Balance problem, Insomnia    · Zolpidem Tartrate 10 MG Oral Tablet; TAKE 1 TABLET AT BEDTIME   Rx By: Avelino Valentine; Dispense: 30 Days ; #:30 Tablet; Refill: 5; For: Balance problem, Insomnia; MEG = N; Call Rx  Chronic obstructive pulmonary disease    · PredniSONE 10 MG Oral Tablet; 1 PO OD   Rx By: Avelino Valentine; Dispense: 0 Days ; #:10 Tablet; Refill: 2; For: Chronic obstructive pulmonary disease; MEG = N; Sent To: [facility]  Essential hypertension    · Call [phone] if: You become dizzy or lightheaded, especially when you stand up  after sitting for a while ; Status:Complete;   Done: 28PKE2677   Ordered;  For:Essential hypertension; Ordered By:Favio Funk;   · Call [phone] if: You develop double vision (see two of everything) ;  Status:Complete;   Done: 53CQW8878   Ordered;  For:Essential hypertension; Ordered By:Favio Funk;   · Call [phone] if: Your blood pressure is frequently higher than 140/90 ;  Status:Complete;   Done: 54BOA3920   Ordered;  For:Essential hypertension; Ordered By:Favio Funk;   · Call 911 if:  You experience a new kind of chest pain (angina) or pressure ;  Status:Complete;   Done: 78WQQ4000   Ordered;  For:Essential hypertension; Ordered By:Chanelle Funk;   · Call 911 if: You have any symptoms of a stroke ; Status:Complete;   Done: 41KWV1631   Ordered;  For:Essential hypertension; Ordered By:Favio Funk;   · Seek Immediate Medical Attention if: You have a severe headache that will not go away ;  Status:Complete;   Done: 74ZMD4625   Ordered;  For:Essential hypertension; Ordered Austin Ill;   · Seek Immediate Medical Attention if: Your blood pressure is greater than 250/120 for 2  consecutive readings ; Status:Complete;   Done: 86KUL1242   Ordered;  For:Essential hypertension; Ordered By:Favio Funk;    Discussion/Summary  Discussion Summary:   Will cont pred at 10 daily for now make appmt with pulm asap cont O2 as needed cont meds for htn and alpha 1 antit monitor abd sx instructions renew zolpidem for now have pulm review  Counseling Documentation With Imm: The patient's family was counseled regarding  Chief Complaint  Chief Complaint Free Text Note Form: pt here for [facility]  patient needs refill of zolpidem      History of Present Illness  TCM Communication [facility]: The patient is being contacted for follow-up after hospitalization  Hospital records were reviewed  He was hospitalized at [address] [place] [address]  The date of admission: 12/29/17, date of discharge: 1/3/18  Diagnosis: COPD exacerbation,cough,rib pain  He was discharged to home  Medications reviewed and updated today  He scheduled a follow up appointment  Follow-up appointments with other specialists: Pulmonary  Symptoms: cough and shortness of breath  Counseling was provided to the patient  Topics counseled included instructions for management  Communication performed and completed by DURGA Painter LPN   HPI: Patient seen in f/u from hosp for copd and rsv was d/cd on cont O2 and pred taper still on 10 daily has noted cont need for O2 during exertion needs refill on zolpidem which has been helpul abd pain is resolved but lower leg pain has started to return would like to f/u with local pulm no other new issues      Review of Systems  Complete-Male:   Constitutional: as noted in HPI  Eyes: No complaints of eye pain, no red eyes, no discharge from eyes, no itchy eyes  ENT: no complaints of earache, no hearing loss, no nosebleeds, no nasal discharge, no sore throat, no hoarseness     Cardiovascular: No complaints of slow heart rate, no fast heart rate, no chest pain, no palpitations, no leg claudication, no lower extremity  Respiratory: as noted in HPI  Gastrointestinal: as noted in HPI  Genitourinary: No complaints of dysuria, no incontinence, no hesitancy, no nocturia, no genital lesion, no testicular pain  Musculoskeletal: as noted in HPI  Integumentary: No complaints of skin rash or skin lesions, no itching, no skin wound, no dry skin  Neurological: No compliants of headache, no confusion, no convulsions, no numbness or tingling, no dizziness or fainting, no limb weakness, no difficulty walking  Psychiatric: Is not suicidal, no sleep disturbances, no anxiety or depression, no change in personality, no emotional problems  Endocrine: No complaints of proptosis, no hot flashes, no muscle weakness, no erectile dysfunction, no deepening of the voice, no feelings of weakness  Hematologic/Lymphatic: No complaints of swollen glands, no swollen glands in the neck, does not bleed easily, no easy bruising  Active Problems    1  Allergic rhinitis (477 9) (J30 9)   2  Alpha-1-antitrypsin deficiency (273 4) (E88 01)   3  Arthritis (716 90) (M19 90)   4  Balance problem (781 99) (R26 89)   5  Benign colon polyp (211 3) (K63 5)   6  BPH with obstruction/lower urinary tract symptoms (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,599 69) (N40 1,N13 8)   7  Cataracts, both eyes (366 9) (H26 9)   8  Cellulitis of foot (682 7) (L03 119)   9  Chest congestion (786 9) (R09 89)   10  Chronic diarrhea of unknown origin (787 91) (K52 9)   11  Chronic obstructive pulmonary disease (496) (J44 9)   12  Cold sore (054 9) (B00 1)   13  Family history of Colon cancer   15  Complex Regional Pain Syndrome Type II Of Lower Limb (355 71)   15  Dizziness (780 4) (R42)   16  Edema (782 3) (R60 9)   17  Effusion of right olecranon bursa (719 02) (M25 421)   18  Essential hypertension (401 9) (I10)   19  Floaters, left (379 24) (H43 392)   20   Gastric ulcer (531 90) (K25 9) 21  Gastroesophageal reflux disease without esophagitis (530 81) (K21 9)   22  Heart murmur (785 2) (R01 1)   23  Lightheadedness (780 4) (R42)   24  Myalgia (729 1) (M79 1)   25  Nocturnal leg cramps (327 52) (G47 62)   26  Olecranon bursitis (726 33) (M70 20)   27  Peripheral neuropathy (356 9) (G62 9)   28  Persistent cough (786 2) (R05)   29  Refused influenza vaccine (V64 06) (Z28 21)   30  Skin lesion of face (709 9) (L98 9)   31  Skin lesion of hand (709 9) (L98 9)   32  Thyroid disorder (246 9) (E07 9)   33  Visual disturbances (368 9) (H53 9)    Past Medical History    1  History of Arm skin lesion, right (709 9) (L98 9)   2  History of Body mass index (BMI) 22 0-22 9, adult (V85 1) (Z68 22)   3  History of Body mass index (BMI) 23 0-23 9, adult (V85 1) (Z68 23)   4  History of C  difficile diarrhea (008 45) (A04 72)   5  History of COPD exacerbation (491 21) (J44 1)   6  History of Diarrhea, unspecified type (787 91) (R19 7)   7  History of acute bronchitis (V12 69) (Z87 09)   8  History of chest pain (V13 89) (Z87 898)   9  History of shortness of breath (V13 89) (Z87 898)   10  History of Impacted cerumen of right ear (380 4) (H61 21)   11  History of Leg swelling (729 81) (M79 89)    Surgical History    1  History of Back Surgery    Family History  Mother    1  No pertinent family history  Brother    2  Family history of Colon cancer  Family History    3  Family history of colitis (V18 59) (Z83 79)   4  Denied: Family history of colon cancer   5  Denied: Family history of Crohn's disease   6  Family history of liver disease (V18 59) (Z83 79)    Social History    · Denied: History of Alcohol Use (History)   · Current Every Day Smoker (305 1)   · Denied: History of Drug Use  Social History Reviewed: The social history was reviewed and is unchanged  Current Meds   1  Advair Diskus 250-50 MCG/DOSE Inhalation Aerosol Powder Breath Activated Recorded   2   Albuterol Sulfate (2 5 MG/3ML) 0 083% Inhalation Nebulization Solution; USE 1 UNIT   DOSE IN NEBULIZER EVERY 4 HOURS AS NEEDED; Therapy: 25Apr2017 to (Last TS:08UCL2666)  Requested for: 73BXE4907; Status: ACTIVE   - Transmit to Pharmacy - Awaiting Verification Ordered   3  Alpha1-Proteinase Inhibitor 1000 MG/50ML SOLN;   Therapy: (Recorded:23Jan2014) to Recorded   4  AmLODIPine Besylate 5 MG Oral Tablet; take one tablet by mouth one time daily; Therapy: 51NFX5756 to (Jan Woods)  Requested for: 53YLR0162; Last   Rx:11Nov2016 Ordered   5  Fluticasone Propionate 50 MCG/ACT Nasal Suspension; Two sprays in each nostril   once daily; Therapy: 24MTP4133 to (Jan Woods)  Requested for: 44PXK1144; Last   Rx:22Nov2017; Status: [address] to Shwetha Verification Ordered   6  Furosemide 20 MG Oral Tablet; take 1 tablet every day; Therapy: 59WON2016 to (Last Gladystine Sinks)  Requested for: 34WUC5333 Ordered   7  Meclizine HCl - 12 5 MG Oral Tablet; TAKE 1 TABLET DAILY; Therapy: 25Apr2017 to (Evaluate:20Tzq4280)  Requested for: 36KMT0035; Last   Rx:29Jun2017 Ordered   8  Omeprazole 40 MG Oral Capsule Delayed Release; TAKE ONE CAPSULE BY MOUTH   TWICE DAILY; Therapy: 26URF2347 to (Evaluate:71Drz0256)  Requested for: 30Mdq1270; Last   Rx:15Ttt9775 Ordered   9  ProAir  (90 Base) MCG/ACT Inhalation Aerosol Solution; Therapy: [phone]) to Recorded   10  Spiriva HandiHaler 18 MCG Inhalation Capsule; Therapy: [phone]) to Recorded   11  Tamsulosin HCl - 0 4 MG Oral Capsule; TAKE ONE CAPSULE BY MOUTH ONE TIME    DAILY; Therapy: 77VXI4174 to (Evaluate:18Jun2018)  Requested for: 36Qdd1423; Last    Rx:00Ylz6194 Ordered   12  Zemaira 1000 MG Intravenous Solution Reconstituted; Therapy: [phone]) to Recorded   13  Zolpidem Tartrate 10 MG Oral Tablet; TAKE 1 TABLET AT BEDTIME; Therapy: 51MRG5319 to (Evaluate:08Jan2018); Last Rx:09Nov2017 Ordered  Medication List Reviewed:    The medication list was reviewed and updated today  Allergies    1  Chantix TABS    2  No Known Latex Allergies    Vitals  Signs   Recorded: 20MLA8074 08:40AM   Heart Rate: 96  Respiration: 18  Systolic: 776  Diastolic: 62  Height: 6 ft 3 in  Weight: 173 lb   BMI Calculated: 21 62  BSA Calculated: 2 06  O2 Saturation: 97  Pain Scale: 0    Physical Exam    Constitutional   General appearance: No acute distress, well appearing and well nourished  Eyes   Conjunctiva and lids: No swelling, erythema, or discharge  Ears, Nose, Mouth, and Throat   External inspection of ears and nose: Normal     Oropharynx: Normal with no erythema, edema, exudate or lesions  Pulmonary   Auscultation of lungs: Abnormal   dec bs bessie  Cardiovascular   Auscultation of heart: Normal rate and rhythm, normal S1 and S2, without murmurs  Examination of extremities for edema and/or varicosities: Normal     Abdomen   Abdomen: Non-tender, no masses  Lymphatic   Palpation of lymph nodes in neck: No lymphadenopathy  Musculoskeletal neg  Skin   Skin and subcutaneous tissue: Normal without rashes or lesions  Neurologic no focal findings  Psychiatric   Orientation to person, place and time: Normal     Mood and affect: Normal          Health Management  Benign colon polyp   COLONOSCOPY; every 5 years; Last 87MXK2715; Next Due: 20GWI3715; Active  Gastroesophageal reflux disease without esophagitis   EGD; every 2 years; Last 48YAQ6935; Next Due: 85OSI5503; Overdue    Message   Recorded as Task   Date: 01/03/2018 07:59 PM, Created By: System   Task Name: [facility]   Assigned To: Jaz Truong   Regarding Patient: Shoshana Patel, Status:  In Progress   Comment:    System - 03 Jan 2018 7:59 PM     Patient discharged from hospital   Patient Name: Jose Pedroza  Patient YOB: 1945  Discharge Date: 1/3/2018  [facility]ility: [facility] - 04 Jan 2018 9:08 AM     TASK EDITED   Cinda Gatica - 05 Jan 2018 10:34 AM     TASK IN PROGRESS     Future Appointments    Date/Time Provider Specialty Site   02/13/2018 08:30 AM DAVID Merritt   Family Medicine [facility]     Signatures   Electronically signed by : Merrell Kayser, M D ; Jan 12 2018 11:35AM EST                       (Author)

## 2018-02-27 ENCOUNTER — OFFICE VISIT (OUTPATIENT)
Dept: HEMATOLOGY ONCOLOGY | Facility: MEDICAL CENTER | Age: 73
End: 2018-02-27
Payer: MEDICARE

## 2018-02-27 VITALS
HEART RATE: 76 BPM | BODY MASS INDEX: 22.3 KG/M2 | SYSTOLIC BLOOD PRESSURE: 158 MMHG | HEIGHT: 75 IN | TEMPERATURE: 97.9 F | OXYGEN SATURATION: 99 % | RESPIRATION RATE: 18 BRPM | DIASTOLIC BLOOD PRESSURE: 82 MMHG | WEIGHT: 179.4 LBS

## 2018-02-27 DIAGNOSIS — R91.8 PULMONARY NODULES: Primary | ICD-10-CM

## 2018-02-27 PROCEDURE — 99214 OFFICE O/P EST MOD 30 MIN: CPT | Performed by: INTERNAL MEDICINE

## 2018-02-27 NOTE — PROGRESS NOTES
Alba Guillen [facility]  1945  [address] [facility]  [address] [place] [id number]    DISCUSSION  SUMMARY:    76-year-old male recently seen in the hospital with chest pain  Workup demonstrated an acute pulmonary and was within the distal left main pulmonary artery and proximal left lower lobe segmental branches  Issues:    1  Pulmonary embolism  Patient is on eliquis  We discussed what to monitor for is far as acute respiratory issues as well as excessive bruising/bleeding  2  Pulmonary nodules, emphysema  Pulmonary follow-up is ongoing  Case is being evaluated by IR for biopsy  3  Alpha-1 antitrypsin deficiency  Recent alpha-fetoprotein level was within normal limits  Surveillance is ongoing  Patient is to return in 2 months  Patient knows to call the hematology/oncology office if there are any other questions or concerns  Carefully review your medication list and verify that the list is accurate and up-to-date  Please call the hematology/oncology office if there are medications missing from the list, medications on the list that you are not currently taking or if there is a dosage or instruction that is different from how you're taking that medication  Patient goals and areas of care: continue with anticoagulation  Patient is able to self-care   _____________________________________________________________________________________    Chief Complaint   Patient presents with    Follow-up     reason hospital patient, PE, pulmonary nodules     History of Present Illness:    76-year-old male recently admitted to the hospital with chest pain  Workup demonstrated PE  Patient was started on anticoagulation, now on eliquis  Mr  [facility] states feeling okay, better than before  Chest pain is less/better than before  Dry cough is the same as before; no sputum or hemoptysis  Shortness of breath and dyspnea on exertion is the same as before   No fevers, chills or sweats  Appetite is okay, weight is stable  Activities are limited but no different than before  Review of Systems   Constitutional: Negative  HENT: Negative  Eyes: Negative  Respiratory: Positive for cough and shortness of breath  Cardiovascular: Negative  Gastrointestinal: Negative  Endocrine: Negative  Genitourinary: Negative  Musculoskeletal: Negative  Skin: Negative  Allergic/Immunologic: Negative  Neurological: Negative  Hematological: Bruises/bleeds easily  Psychiatric/Behavioral: Negative  All other systems reviewed and are negative  Patient Active Problem List   Diagnosis    AAT (alpha-1-antitrypsin) deficiency (HCC)    Moderate COPD (chronic obstructive pulmonary disease) ([facility]    Causalgia of lower limb    Unspecified essential hypertension    Gastroesophageal reflux disease without esophagitis    SOB (shortness of breath)    HTN (hypertension)    BPH (benign prostatic hyperplasia)    Liver disease    Lung nodules    Acute hypoxemic respiratory failure ([facility] )    Former smoker    Chest pain at rest    Lung neoplasm    Acute pulmonary embolism ([facility] ) left-sided   [facility] discharge follow-up     Past Medical History:   Diagnosis Date    Anesthesia complication     Difficult to wake up    Arthritis     BPH (benign prostatic hyperplasia)     urinary frequency    Cancer (HCC)     basal cell neck, face    COPD (chronic obstructive pulmonary disease) ([address] )     Full dentures     Hypertension     controlled    Kidney stone     at least 7 episodes    Liver disease     Alpha 1- enzyme deficiency - diagnosed 2002   has been on weekly replacement therapy since then    Pulmonary emphysema ([facility] )     1/25/15  FEV1 - 2 45 liters or 59% of predicted    Wears glasses     for driving only     Past Surgical History:   Procedure Laterality Date    BACK SURGERY  2008    discectomy    COLONOSCOPY      COLONOSCOPY N/A 3/10/2017    Procedure: COLONOSCOPY-RECALL;  Surgeon: Farzad Tanner MD;  Location: [facility]; Service:    Venda Flank      removal of kidney stones    ESOPHAGOGASTRODUODENOSCOPY N/A 3/10/2017    Procedure: ESOPHAGOGASTRODUODENOSCOPY (EGD); Surgeon: Farzad Tanner MD;  Location: [facility]; Service:     LITHOTRIPSY      TONSILLECTOMY      VEIN LIGATION AND STRIPPING Bilateral     18's     Family History   Problem Relation Age of Onset    Emphysema Mother      never smoked    Emphysema Father     Cancer Brother      colon    Colon cancer Brother     Ulcerative colitis Family     Liver disease Family      Social History     Social History    Marital status:      Spouse name: N/A    Number of children: N/A    Years of education: N/A     Occupational History    Not on file       Social History Main Topics    Smoking status: Former Smoker     Packs/day: 1 00     Years: 60 00     Quit date: 8/31/2017    Smokeless tobacco: Never Used      Comment: quit in august 2017    Alcohol use No    Drug use: No    Sexual activity: Not on file     Other Topics Concern    Not on file     Social History Narrative    No narrative on file       Current Outpatient Prescriptions:     albuterol (2 5 mg/3 mL) 0 083 % nebulizer solution, Inhale 1 each every 4 (four) hours as needed, Disp: , Rfl:     albuterol (PROVENTIL HFA,VENTOLIN HFA) 90 mcg/act inhaler, Inhale 2 puffs every 6 (six) hours as needed for wheezing, Disp: 1 Inhaler, Rfl: 0    Alpha1-Proteinase Inhibitor (ZEMAIRA IV), Infuse into a venous catheter once a week On tuesday -1000mg /50 ml , Disp: , Rfl:     amLODIPine (NORVASC) 5 mg tablet, Take 1 tablet (5 mg total) by mouth every morning, Disp: 30 tablet, Rfl: 5    apixaban (ELIQUIS) 5 mg, Take 1 tablet (5 mg total) by mouth 2 (two) times a day, Disp: 60 tablet, Rfl: 0    fluticasone (FLONASE) 50 mcg/act nasal spray, , Disp: , Rfl:     fluticasone-salmeterol (ADVAIR DISKUS) 250-50 mcg/dose inhaler, Inhale, Disp: , Rfl:     meclizine (ANTIVERT) 12 5 MG tablet, Take 1 tablet by mouth daily, Disp: , Rfl:     omeprazole (PriLOSEC) 40 MG capsule, , Disp: , Rfl:     tamsulosin (FLOMAX) 0 4 mg, Take 0 4 mg by mouth daily at bedtime, Disp: , Rfl:     ZEMAIRA injection, , Disp: , Rfl:     zolpidem (AMBIEN CR) 6 25 MG CR tablet, Take 10 mg by mouth daily at bedtime as needed for sleep, Disp: , Rfl:     Allergies   Allergen Reactions    Chantix [Varenicline]      Caused prostate infection       Vitals:    02/27/18 0852   BP: 158/82   Pulse: 76   Resp: 18   Temp: 97 9 °F (36 6 °C)   SpO2: 99%     Physical Exam   Constitutional: He is oriented to person, place, and time  He appears well-developed and well-nourished  Thin male, mild respiratory distress, nasal cannula O2 in place   HENT:   Head: Normocephalic and atraumatic  Right Ear: External ear normal    Left Ear: External ear normal    Nose: Nose normal    Mouth/Throat: Oropharynx is clear and moist    Eyes: Conjunctivae and EOM are normal  Pupils are equal, round, and reactive to light  Neck: Normal range of motion  Neck supple  Cardiovascular: Normal rate, regular rhythm, normal heart sounds and intact distal pulses  Pulmonary/Chest:   Decreased breath sounds bilaterally, otherwise clear   Abdominal: Soft  Bowel sounds are normal    Musculoskeletal: Normal range of motion  Neurological: He is alert and oriented to person, place, and time  He has normal reflexes  Skin: Skin is warm  Skin with relatively good color, scattered ecchymoses, no petechiae   Psychiatric: He has a normal mood and affect   His behavior is normal  Judgment and thought content normal    Extremities: no edema, no cords, pulses are 1+  Lymphatics: no adenopathy in the neck, supraclavicular region, axilla and groin bilaterally    Labs:    2/18/18 BUN = 18 creatinine = 1 17 WBC = 5 9 hemoglobin = 12 1 hematocrit = 36 platelet = 358  1/20/10 alpha-fetoprotein = 4 8    Imaging    2/14/18 CTA chest PE study     1   Acute pulmonary embolus in the distal left main pulmonary artery and proximal left lower lobe segmental branches  2   Moderate panlobular emphysema  3   Stable right upper and left lower pulmonary nodules measuring 1 2 x 1 3 cm  PET/CT was performed, based on current Fleischner Society 2017 Guidelines on incidental pulmonary nodule   Tissue sampling of the right upper lobe hypermetabolic nodule is   planned   3 month follow-up of left lower lobe pulmonary nodule as previously recommended      2/12/18 PET/CT     1   1 2 x 0 8 cm right upper lung nodule demonstrates hypermetabolism and is most concerning for malignancy   Tissue sampling recommended  2   1 3 x 0 6 cm left lower lung nodule does not demonstrate significant hypermetabolism   In the absence of tissue sampling, 3 month CT follow-up is recommended to exclude a low-grade neoplasm  3   No hypermetabolic metastases visualized

## 2018-02-28 ENCOUNTER — CLINICAL SUPPORT (OUTPATIENT)
Dept: PULMONOLOGY | Facility: CLINIC | Age: 73
End: 2018-02-28
Payer: MEDICARE

## 2018-02-28 DIAGNOSIS — J44.9 MODERATE COPD (CHRONIC OBSTRUCTIVE PULMONARY DISEASE) (HCC): ICD-10-CM

## 2018-02-28 PROCEDURE — G0424 PULMONARY REHAB W EXER: HCPCS

## 2018-03-02 ENCOUNTER — APPOINTMENT (OUTPATIENT)
Dept: PULMONOLOGY | Facility: CLINIC | Age: 73
DRG: 189 | End: 2018-03-02
Payer: MEDICARE

## 2018-03-02 NOTE — PROGRESS NOTES
Cardiac/Pulmonary Rehabilitation Plan of Care    30 day      Today's date: 3/2/2018   Visits: 4  Patient name: Nathaly Ignacio      : 1945  Age: 75 y o  MRN: [id number]  Referring Physician: Inga Allen MD  Provider: Negar Ferrer [facility]  Clinician: Elsa Patterson RN    Dx:   Encounter Diagnosis   Name Primary?  Moderate COPD (chronic obstructive pulmonary disease) ([facility])      Date of onset: 2004    Medication compliance: Yes   Comments: none  Fall Risk: Yes   Comments: Oxygen tubing, and dyspnea with minimal exertion    EXERCISE/ACTIVITY    Cardiovascular:   Min: 44   METS: 1 9   Hr: 107   RPE: 5   O2 sat: 93-96    Modalities: Treadmill, AD bike, UBE, NuStep and Recumbent bike  Strength trainin-3 days / week, 12-15 repitations  and 1-2 sets per modality    Modalities: Arm curl and upright rows, shrugs lateral and frontal raise  EKG changes: N/A  Dyspnea score: 5  Home activity: none  Goals: Increase endurance and strength to return to the club house to play pool, ping pong and shuffle board  Increase endurance to walk a longer distance than 995 feet in 6 minutes in 12 weeks  Education: explained pulmonary rehabilitation and how to use cardiovascular equipment, discussed relaxation breathing  Plan: treadmill, 1 2 mph with a 2 % incline 6 to 10 minutes, recumbent bike 10 minutes level 2 or airdyne bike 10 minutes, arm ergometer 5 to 10 minutes level 2, nu-step for 15 minutes level 5   Increase time and resistance as tolerated  Readiness to change: 10    NUTRITION    Weight control:    Starting weight: 176 pounds   Current weight: 179  Waist circumference:    Startin 5 inches   Current:    Diabetes: none  Lipid management: none  Goals: choose lower fat beef products and processed foods  Education: rate your plate, discussed sodium and fat in foods  Plan: decreased the amount of fat and processed food in diet  Readiness to change: 2    PSYCHOSOCIAL    Emotional: Self-reported stress level: 5   Social support: daughter  Goals: improve depression by increasing endurance to return to activities at the clubhouse  Education: none  Plan: provide positive encouragement and relaxation techniques  Readiness to change: 5    OTHER CORE COMPONENTS     Tobacco:   History   Smoking Status    Former Smoker    Packs/day: 1 00    Years: 60 00    Quit date: 8/31/2017   Smokeless Tobacco    Never Used     Comment: quit in august 2017     Blood pressure:    Resting: Resting BP: 152/84   Exercise: Recovery BP: 142/76  Goals: none  Education: none  Plan: none  Readiness to change: 1   CAT score 26  Comments: On 2/14/2018 Pt was very short of breath during Pulmonary Rehabilitation states he is short of breath RPD 4  Contacted Dr Sukhjinder Mike office and they informed him to go to ER for evaluation  Pt refuse squad at first, stated he will drive himself  O2 Sat 99% on 3l/min via nc  Audible wheeze  He was transferred to [facility] ambulance  HE stated he had a Pulmonary embolism, was hospitalized from 2/14/2018 to 2/18/2018  He returned to pulmonary rehabilitation on 2/23/18  He completes 44 minutes of cardiovascular exercise with a light weight strength training component  He continues to wear supplemental oxygen at 3l/min at rest and with exercise  His exercise MET level is 1 9 MET's  RPE 5 RPE 3 to 4  Will continue to monitor and increase workload as tolerated

## 2018-03-05 ENCOUNTER — CLINICAL SUPPORT (OUTPATIENT)
Dept: PULMONOLOGY | Facility: CLINIC | Age: 73
DRG: 189 | End: 2018-03-05
Payer: MEDICARE

## 2018-03-05 DIAGNOSIS — J44.9 MODERATE COPD (CHRONIC OBSTRUCTIVE PULMONARY DISEASE) (HCC): ICD-10-CM

## 2018-03-05 PROCEDURE — G0424 PULMONARY REHAB W EXER: HCPCS

## 2018-03-07 ENCOUNTER — APPOINTMENT (OUTPATIENT)
Dept: PULMONOLOGY | Facility: CLINIC | Age: 73
DRG: 189 | End: 2018-03-07
Payer: MEDICARE

## 2018-03-09 ENCOUNTER — CLINICAL SUPPORT (OUTPATIENT)
Dept: PULMONOLOGY | Facility: CLINIC | Age: 73
DRG: 189 | End: 2018-03-09
Payer: MEDICARE

## 2018-03-09 DIAGNOSIS — J44.9 MODERATE COPD (CHRONIC OBSTRUCTIVE PULMONARY DISEASE) (HCC): ICD-10-CM

## 2018-03-09 PROCEDURE — G0424 PULMONARY REHAB W EXER: HCPCS

## 2018-03-12 ENCOUNTER — TRANSCRIBE ORDERS (OUTPATIENT)
Dept: ADMINISTRATIVE | Facility: HOSPITAL | Age: 73
End: 2018-03-12

## 2018-03-12 ENCOUNTER — TELEPHONE (OUTPATIENT)
Dept: PULMONOLOGY | Facility: MEDICAL CENTER | Age: 73
End: 2018-03-12

## 2018-03-12 ENCOUNTER — OFFICE VISIT (OUTPATIENT)
Dept: PULMONOLOGY | Facility: MEDICAL CENTER | Age: 73
End: 2018-03-12
Payer: MEDICARE

## 2018-03-12 ENCOUNTER — HOSPITAL ENCOUNTER (OUTPATIENT)
Dept: RADIOLOGY | Facility: HOSPITAL | Age: 73
Discharge: HOME | End: 2018-03-12
Payer: MEDICARE

## 2018-03-12 ENCOUNTER — APPOINTMENT (OUTPATIENT)
Dept: LAB | Facility: HOSPITAL | Age: 73
End: 2018-03-12
Payer: MEDICARE

## 2018-03-12 ENCOUNTER — CLINICAL SUPPORT (OUTPATIENT)
Dept: PULMONOLOGY | Facility: CLINIC | Age: 73
DRG: 189 | End: 2018-03-12
Payer: MEDICARE

## 2018-03-12 VITALS
HEIGHT: 77 IN | SYSTOLIC BLOOD PRESSURE: 142 MMHG | HEART RATE: 91 BPM | RESPIRATION RATE: 12 BRPM | WEIGHT: 178 LBS | OXYGEN SATURATION: 89 % | BODY MASS INDEX: 21.02 KG/M2 | DIASTOLIC BLOOD PRESSURE: 76 MMHG | TEMPERATURE: 97.7 F

## 2018-03-12 DIAGNOSIS — R06.02 SHORTNESS OF BREATH: ICD-10-CM

## 2018-03-12 DIAGNOSIS — I26.99 OTHER ACUTE PULMONARY EMBOLISM WITHOUT ACUTE COR PULMONALE (HCC): Primary | ICD-10-CM

## 2018-03-12 DIAGNOSIS — J44.1 COPD WITH EXACERBATION (HCC): ICD-10-CM

## 2018-03-12 DIAGNOSIS — J44.9 MODERATE COPD (CHRONIC OBSTRUCTIVE PULMONARY DISEASE) (HCC): ICD-10-CM

## 2018-03-12 DIAGNOSIS — J44.9 COPD (CHRONIC OBSTRUCTIVE PULMONARY DISEASE) WITH CHRONIC BRONCHITIS (HCC): ICD-10-CM

## 2018-03-12 DIAGNOSIS — R06.00 DYSPNEA ON EXERTION: Primary | ICD-10-CM

## 2018-03-12 DIAGNOSIS — R06.00 DYSPNEA ON EXERTION: ICD-10-CM

## 2018-03-12 LAB
ANION GAP SERPL CALCULATED.3IONS-SCNC: 12 MMOL/L (ref 4–13)
BASOPHILS # BLD AUTO: 0.1 THOUSANDS/ΜL (ref 0–0.1)
BASOPHILS NFR BLD AUTO: 1 % (ref 0–1)
BUN SERPL-MCNC: 14 MG/DL (ref 5–25)
CALCIUM SERPL-MCNC: 9.5 MG/DL (ref 8.3–10.1)
CHLORIDE SERPL-SCNC: 105 MMOL/L (ref 100–108)
CO2 SERPL-SCNC: 25 MMOL/L (ref 21–32)
CREAT SERPL-MCNC: 1.09 MG/DL (ref 0.6–1.3)
EOSINOPHIL # BLD AUTO: 0.3 THOUSAND/ΜL (ref 0–0.61)
EOSINOPHIL NFR BLD AUTO: 5 % (ref 0–6)
ERYTHROCYTE [DISTWIDTH] IN BLOOD BY AUTOMATED COUNT: 14.4 % (ref 11.6–15.1)
GFR SERPL CREATININE-BSD FRML MDRD: 67 ML/MIN/1.73SQ M
GLUCOSE SERPL-MCNC: 105 MG/DL (ref 65–140)
HCT VFR BLD AUTO: 39.8 % (ref 42–52)
HGB BLD-MCNC: 13.6 G/DL (ref 14–18)
LYMPHOCYTES # BLD AUTO: 1.5 THOUSANDS/ΜL (ref 0.6–4.47)
LYMPHOCYTES NFR BLD AUTO: 21 % (ref 14–44)
MCH RBC QN AUTO: 32.9 PG (ref 27–31)
MCHC RBC AUTO-ENTMCNC: 34.1 G/DL (ref 31.4–37.4)
MCV RBC AUTO: 97 FL (ref 82–98)
MONOCYTES # BLD AUTO: 0.6 THOUSAND/ΜL (ref 0.17–1.22)
MONOCYTES NFR BLD AUTO: 9 % (ref 4–12)
NEUTROPHILS # BLD AUTO: 4.5 THOUSANDS/ΜL (ref 1.85–7.62)
NEUTS SEG NFR BLD AUTO: 65 % (ref 43–75)
NRBC BLD AUTO-RTO: 0 /100 WBCS
PLATELET # BLD AUTO: 264 THOUSANDS/UL (ref 130–400)
PMV BLD AUTO: 6 FL (ref 8.9–12.7)
POTASSIUM SERPL-SCNC: 3.9 MMOL/L (ref 3.5–5.3)
RBC # BLD AUTO: 4.12 MILLION/UL (ref 4.7–6.1)
SODIUM SERPL-SCNC: 142 MMOL/L (ref 136–145)
WBC # BLD AUTO: 7 THOUSAND/UL (ref 4.8–10.8)

## 2018-03-12 PROCEDURE — 36415 COLL VENOUS BLD VENIPUNCTURE: CPT

## 2018-03-12 PROCEDURE — 71275 CT ANGIOGRAPHY CHEST: CPT

## 2018-03-12 PROCEDURE — 85025 COMPLETE CBC W/AUTO DIFF WBC: CPT

## 2018-03-12 PROCEDURE — G0424 PULMONARY REHAB W EXER: HCPCS

## 2018-03-12 PROCEDURE — 94010 BREATHING CAPACITY TEST: CPT | Performed by: NURSE PRACTITIONER

## 2018-03-12 PROCEDURE — 80048 BASIC METABOLIC PNL TOTAL CA: CPT

## 2018-03-12 PROCEDURE — 99215 OFFICE O/P EST HI 40 MIN: CPT | Performed by: NURSE PRACTITIONER

## 2018-03-12 RX ORDER — CEFUROXIME AXETIL 500 MG/1
500 TABLET ORAL EVERY 12 HOURS SCHEDULED
Qty: 10 TABLET | Refills: 0 | Status: SHIPPED | OUTPATIENT
Start: 2018-03-12 | End: 2018-03-16

## 2018-03-12 RX ORDER — PREDNISONE 10 MG/1
TABLET ORAL
Qty: 50 TABLET | Refills: 0 | Status: ON HOLD | OUTPATIENT
Start: 2018-03-12 | End: 2018-03-30

## 2018-03-12 RX ADMIN — IOHEXOL 85 ML: 350 INJECTION, SOLUTION INTRAVENOUS at 13:13

## 2018-03-12 NOTE — ASSESSMENT & PLAN NOTE
Ashanti Lockett is a 65-year-old male who was here today for hospital follow-up  He was discharged from [address] [place] [address] on April 18, 2018 and was admitted on February 14, 2018  Principal diagnosis was acute pulmonary embolism of the left side he also has history of alpha-1 antitrypsin deficiency moderate COPD and GERD  While he was hospitalized he had CTA of the chest which showed an acute pulmonary embolism in the distal left main pulmonary artery and proximal left lower lobe segmental branches they are stable right upper and left lower pulmonary nodules  He also had venous duplex for which the right side showed low was negative and left lower extremity subacute to chronic nonocclusive DVT of popliteal vein  He was started on Lovenox and is currently on Eliquis  He was also admitted in December 29, 2017 to January 3, 2018 for shortness of breath, COPD exacerbation and history of alpha 1 antitrypsin deficiency  He has been on Zemaira IV weekly since 2006  According to patient he has been followed since discharge with visiting nurse  Last progress note from Dr Chitra solitario on February 16, 2018 reports that he does have a right upper lung lung nodule 1 2 x 0 8 cm with mild hypermetabolic activity with SUV of 3 4  This was noted on recent PET-CT scan done as outpatient  Dr jose solitario felt that this could be inflammatory lesion from recent RSV respiratory tract infection versus neoplasm there is also a 2nd nodule on the left lower lobe measuring 1 3 cm x 0 6 cm without any hypermetabolic some this could be phlegm a noe  FEV1 is 2 14 L or 55% of predicted he is here today for follow-up

## 2018-03-12 NOTE — ASSESSMENT & PLAN NOTE
Mr  heel is here today post hospitalization  He was discharged on February 18, 2018  Was noted to have a PE this was seen on CTA in the distal left main pulmonary artery and proximal left lower lobe segmental branches  He does have lung nodules of uncertain origin  There is a right upper and left lower lung nodule for which tissue sampling was to be planned  He is taking is Eliquis currently the right upper lung nodules 1 2 x 0 8 cm with mild hypermetabolic activity with SUV of 3 4 noted on recent PET-CT Dr jose solitario felt this could be inflammatory lesion from recent RSV respiratory tract infection infection versus neoplasm there is also a 2nd nodule 1 3 x 0 6 cm left lower lobe without any hypermetabolic zone  I a m  requesting a stat CTA of the chest today  I discussed with Dr Yanet Haile  He concurs with this plan of care  Lung lesions will be reviewed at that time  And any sampling will be discussed  Repeat CTA was done and reviewed  Pulmonary embolisn in the distal main pulmonary artery and segmental branch to left lower lobe smaller compared to prior study but is not completely resolved  He was called and is aware that CTA will be done again in 3 months and anticoagulation to be continued  Lung nodules stable  REviewed with Dr Yanet Haile, reexamination will be at next CTA  These nodules could be inflammatory

## 2018-03-12 NOTE — ASSESSMENT & PLAN NOTE
Mr sampson has history of COPD  PFT was done today in office and reviewed from pulmonary function test from January 2018  FVC went from 4 94 L to 90 for 2% of predicted to 4 48 L or 81% of predicted FEV1 was 2 14 L or 55% of predicted in today is 1 84 L or 45% of predicted obstruction ratio remains the same  He was given tapering dose of prednisone 40 mg x4 days 30 mg x4 days 20 mg x4 days and 10 mg x4 days  He was also ordered Ceftin 500 mg p  o  b i d  x5 days  He was seen by Dr Long  We will be reviewing his CTA of the chest done to re-evaluate extent of pulmonary embolism in whether not there has been some reduction in clot burden  Follow-up will be as needed

## 2018-03-12 NOTE — PATIENT INSTRUCTIONS
Pulmonary Embolism   WHAT YOU NEED TO KNOW:   What is a pulmonary embolism? A pulmonary embolism (PE) is the sudden blockage of a blood vessel in the lungs by an embolus  An embolus is a small piece of blood clot, fat, air, or tumor cells  The embolus cuts off the blood supply to your lungs  A pulmonary embolism can become life-threatening  What increases my risk for a PE?   · Obesity    · Smoking    · Birth control pills    · Certain blood diseases, such as thrombophilia and hyperhomocysteinemia    · Medical conditions, such as a deep venous thrombosis (DVT) or cancer    · Pregnancy and childbirth    · Recent surgery    · Sitting or lying in one position for a long time, such as when you travel by plane  What are the signs and symptoms of a PE?   · Sudden shortness of breath or fast breathing    · Sudden chest pain that is worse when you take a deep breath    · Fast heartbeat    · Fever and coughing up blood    · Bluish nails    · Cold, pale, clammy skin    · Fainting  How is a PE diagnosed? Ask your healthcare provider about these and other tests you may need:  · Blood tests  may show signs of the PE or how well your organs are working  · An EKG  test records your heart rhythm and how fast your heart beats  It is used to check for abnormal heart function  · A chest x-ray  may show signs of a lung infection or other damage  · A CT scan  may show the PE  You may be given contrast liquid to help your lungs show up better in the pictures  Tell the healthcare provider if you have ever had an allergic reaction to contrast liquid  · A lung scan , or V/Q scan, may show how well blood and oxygen flow in your lungs  A small amount of contrast liquid is used to study your airflow (V) and blood flow (Q)  First, you breathe in medical gas  Then, contrast liquid is injected into a vein  Pictures are taken to see how well your lungs take in oxygen  How is a PE treated?    · Medicines:      ¨ Clot busters are emergency medicines that work to dissolve blood clots  They cannot be used during pregnancy or in people with medical conditions that increase their risk of bleeding  ¨ Blood thinners  help treat the PE and prevent new clots from forming  Examples of blood thinners include heparin, rivaroxaban, apixiban, and warfarin  The following are general safety guidelines to follow while you are taking a blood thinner:     § Watch for bleeding and bruising  Watch for bleeding from your gums or nose  Watch for blood in your urine and bowel movements  Use a soft washcloth on your skin, and a soft toothbrush to brush your teeth  This can keep your skin and gums from bleeding  If you shave, use an electric shaver  Do not play contact sports  § Tell your dentist and other healthcare providers that you take a blood thinner  Wear a bracelet or necklace that says you take this medicine  § Do not start or stop any medicines unless your healthcare provider tells you to  Many medicines cannot be used with blood thinners  § Tell your healthcare provider right away if you forget to take the blood thinner , or if you take too much  § Warfarin  is a blood thinner that you may need to take  The following are additional things you should be aware of if you take warfarin:    § Foods and medicines can affect the amount of warfarin in your blood  Do not make major changes to your diet  Warfarin works best when you eat about the same amount of vitamin K every day  Vitamin K is found in green leafy vegetables and certain other foods  Ask for more information about what to eat or not to eat  § You will need to see your healthcare provider for follow-up visits  You will need regular blood tests to decide how much warfarin you need  · A vena cava filter  may be placed inside your vena cava to prevent another PE  The vena cava is a large vein that brings blood from your lower body up to your heart   The filter traps blood clots and prevents them from going into your lungs  · Surgery , called a thrombectomy, may be done to remove the PE  A procedure called thrombolysis may instead be done to inject a clot buster that helps break the clot apart  How can I decrease my risk for another PE? · Wear pressure stockings  The stockings are tight and put pressure on your legs  This improves blood flow and helps prevent clots  Wear the stockings during the day  Do not wear them when you sleep  · Exercise regularly  Ask about the best exercise plan for you  When you travel by car or work at a desk, take breaks to stand up and move around as much as possible  Rotate your feet in circles often if you sit for a long period of time  · Maintain a healthy weight  Ask your healthcare provider how much you should weigh  Ask him to help you create a weight loss plan if you are overweight  · Do not smoke  Nicotine and other chemicals in cigarettes and cigars can damage blood vessels and increase your risk for another PE  Ask your healthcare provider for information if you currently smoke and need help to quit  E-cigarettes or smokeless tobacco still contain nicotine  Talk to your healthcare provider before you use these products  Call 911 for any of the following:   · You feel lightheaded, short of breath, and have chest pain  · You cough up blood  · You have a seizure  · You have slurred speech, increased sleepiness, or problems seeing, talking, or thinking  · You have weakness or cannot move your arm or leg on one side of your body  When should I seek immediate care? · You feel faint  · You have a severe headache  · Your heart is beating faster than normal   When should I contact my healthcare provider? · The skin on any part of your legs or hips turns purple  · Your gums or nose bleed  · You see blood in your urine or bowel movements      · Your bowel movements are black or darker than normal     · You have questions or concerns about your condition or care  CARE AGREEMENT:   You have the right to help plan your care  Learn about your health condition and how it may be treated  Discuss treatment options with your caregivers to decide what care you want to receive  You always have the right to refuse treatment  The above information is an  only  It is not intended as medical advice for individual conditions or treatments  Talk to your doctor, nurse or pharmacist before following any medical regimen to see if it is safe and effective for you  © 2017 [employer]  Information is for End User's use only and may not be sold, redistributed or otherwise used for commercial purposes  All illustrations and images included in CareNotes® are the copyrighted property of [employer]  or Tyrone Walsh

## 2018-03-12 NOTE — PROGRESS NOTES
Assessment/Plan:     Problem List Items Addressed This Visit        Respiratory    Moderate COPD (chronic obstructive pulmonary disease) ([facility] )     Mr sampson has history of COPD  PFT was done today in office and reviewed from pulmonary function test from January 2018  FVC went from 4 94 L to 90 for 2% of predicted to 4 48 L or 81% of predicted FEV1 was 2 14 L or 55% of predicted in today is 1 84 L or 45% of predicted obstruction ratio remains the same  He was given tapering dose of prednisone 40 mg x4 days 30 mg x4 days 20 mg x4 days and 10 mg x4 days  He was also ordered Ceftin 500 mg p  o  b i d  x5 days  He was seen by Dr Long  We will be reviewing his CTA of the chest done to re-evaluate extent of pulmonary embolism in whether not there has been some reduction in clot burden  Follow-up will be as needed  Relevant Medications    predniSONE 10 mg tablet    Acute pulmonary embolism ([facility] ) left-sided - Primary     Mr  heel is here today post hospitalization  He was discharged on February 18, 2018  Was noted to have a PE this was seen on CTA in the distal left main pulmonary artery and proximal left lower lobe segmental branches  He does have lung nodules of uncertain origin  There is a right upper and left lower lung nodule for which tissue sampling was to be planned  He is taking is Eliquis currently the right upper lung nodules 1 2 x 0 8 cm with mild hypermetabolic activity with SUV of 3 4 noted on recent PET-CT Dr jose solitario felt this could be inflammatory lesion from recent RSV respiratory tract infection infection versus neoplasm there is also a 2nd nodule 1 3 x 0 6 cm left lower lobe without any hypermetabolic zone  I a m  requesting a stat CTA of the chest today  I discussed with Dr Cole Chandra  He concurs with this plan of care  Lung lesions will be reviewed at that time  And any sampling will be discussed  Repeat CTA was done and reviewed    Pulmonary embolisn in the distal main pulmonary artery and segmental branch to left lower lobe smaller compared to prior study but is not completely resolved  He was called and is aware that CTA will be done again in 3 months and anticoagulation to be continued  Lung nodules stable  REviewed with Dr Mehnaz Montoya, reexamination will be at next CTA  These nodules could be inflammatory  Relevant Orders    CTA chest pe study    Basic metabolic panel      Other Visit Diagnoses     COPD (chronic obstructive pulmonary disease) with chronic bronchitis ([facility] )        Relevant Orders    Basic metabolic panel (Completed)    Shortness of breath        Relevant Orders    CTA chest pe study (Completed)    POCT spirometry (Completed)    Basic metabolic panel    COPD with exacerbation (HCC)        Relevant Medications    predniSONE 10 mg tablet    cefuroxime (CEFTIN) 500 mg tablet            Return in about 7 years (around 3/12/2025)  All questions are answered to the patient's satisfaction and understanding  He verbalizes understanding  He is encouraged to call with any further questions or concerns  Portions of the record may have been created with voice recognition software  Occasional wrong word or "sound a like" substitutions may have occurred due to the inherent limitations of voice recognition software  Read the chart carefully and recognize, using context, where substitutions have occurred  ______________________________________________________________________    Chief Complaint:   Chief Complaint   Patient presents with    Shortness of Breath    Cough     clear / nasty taste    Abnormal Imaging Result     had pet scan never got results       Patient ID: Evy Mead is a 75 y o  y o  male has a past medical history of Anesthesia complication; Arthritis; BPH (benign prostatic hyperplasia); Cancer [facility]); COPD (chronic obstructive pulmonary disease) ([facility] ); Full dentures;  Hypertension; Kidney stone; Liver disease; Pulmonary emphysema ([facility] ); and Wears glasses  3/12/2018  Mr  heel today is here for hospital follow-up  He was discharged back in January 10, 2018 for COPD exacerbation GERD without esophagitis and has history of alpha-1 antitrypsin deficiency he is currently on Advair 250/50 1 puff b i d  he is also using in cruise 1 puff daily  He is using nebulizer with albuterol every 6 hours  He also had discharged on February 14 to February 18, 2018 for which he now has a acute pulmonary embolism on the left side and has been more short of breath than usual   He is oxygen dependent using 3 L on continuous basis  He does have a nodule in the right upper left lower lobe apparently he did have hypermetabolic zone noted on recent PET-CT that showed SUV of 3 4  Plan is for patient to have follow-up CTA in 6 weeks  Mr Michelle Hussein has history of COPD  He is using Spiriva 250/51 puff b i d , and also is using in cruise 1 puff daily  He is using nebulizer about every 6 hours  Shortness of Breath   This is a recurrent problem  The current episode started more than 1 month ago  The problem occurs constantly  The problem has been gradually worsening  The symptoms are aggravated by exercise  He has tried ipratropium inhalers, steroid inhalers and beta agonist inhalers for the symptoms  The treatment provided mild relief  His past medical history is significant for COPD and PE  Review of Systems   Constitutional: Negative  HENT: Negative  Eyes: Negative  Respiratory: Positive for chest tightness and shortness of breath  Cardiovascular: Negative  Endocrine: Negative  Genitourinary: Negative  Musculoskeletal: Negative  Skin: Negative  Allergic/Immunologic: Negative  Neurological: Negative  Hematological: Negative  Psychiatric/Behavioral: Negative  Smoking history: He reports that he quit smoking about 6 months ago  He has a 60 00 pack-year smoking history   He has never used smokeless tobacco     The following portions of the patient's history were reviewed and updated as appropriate: past social history and problem list     Immunization History   Administered Date(s) Administered    Influenza TIV (IM) 09/27/2013, 10/23/2014    Pneumococcal Conjugate 13-Valent 04/26/2016    Pneumococcal Polysaccharide PPV23 04/23/2011    Tdap 04/26/2016    Zoster 10/29/2014, 04/27/2015     Current Outpatient Prescriptions   Medication Sig Dispense Refill    albuterol (2 5 mg/3 mL) 0 083 % nebulizer solution Inhale 1 each every 4 (four) hours as needed      albuterol (PROVENTIL HFA,VENTOLIN HFA) 90 mcg/act inhaler Inhale 2 puffs every 6 (six) hours as needed for wheezing 1 Inhaler 0    Alpha1-Proteinase Inhibitor (ZEMAIRA IV) Infuse into a venous catheter once a week On tuesday -1000mg /50 ml       amLODIPine (NORVASC) 5 mg tablet Take 1 tablet (5 mg total) by mouth every morning 30 tablet 5    apixaban (ELIQUIS) 5 mg Take 1 tablet (5 mg total) by mouth 2 (two) times a day 60 tablet 0    fluticasone (FLONASE) 50 mcg/act nasal spray       fluticasone-salmeterol (ADVAIR DISKUS) 250-50 mcg/dose inhaler Inhale      meclizine (ANTIVERT) 12 5 MG tablet Take 1 tablet by mouth daily      omeprazole (PriLOSEC) 40 MG capsule       tamsulosin (FLOMAX) 0 4 mg Take 0 4 mg by mouth daily at bedtime      zolpidem (AMBIEN CR) 6 25 MG CR tablet Take 10 mg by mouth daily at bedtime as needed for sleep      cefuroxime (CEFTIN) 500 mg tablet Take 1 tablet (500 mg total) by mouth every 12 (twelve) hours for 4 days 10 tablet 0    predniSONE 10 mg tablet 4 tabs daily x4 days, 3 tabs daily x 4 days, 2 tabs daily x 4 days 1 tab daily x 4 days  Extra tabs  50 tablet 0    ZEMAIRA injection        No current facility-administered medications for this visit        Allergies: Chantix [varenicline]    Objective:  Vitals:    03/12/18 1043 03/12/18 1051 03/12/18 1054   BP: 142/76     BP Location: Left arm     Pulse: 91     Resp: 12     Temp: 97 7 °F (36 5 °C)     SpO2: 97% 97% (!) 89%   Weight: 80 7 kg (178 lb)     Height: 6' 5" (1 956 m)     Oxygen Therapy  SpO2: (!) 89 %  Oxygen Therapy: None (Room air) (with exertion)  O2 Delivery Method: Nasal cannula  O2 Flow Rate (L/min): 3 L/min    Wt Readings from Last 3 Encounters:   03/12/18 80 7 kg (178 lb)   02/27/18 81 4 kg (179 lb 6 4 oz)   02/23/18 80 8 kg (178 lb 1 oz)     Body mass index is 21 11 kg/m²  Physical Exam   Constitutional: He is oriented to person, place, and time  He appears well-nourished  thin   HENT:   Head: Atraumatic  Eyes: Conjunctivae are normal  Pupils are equal, round, and reactive to light  Neck: Normal range of motion  Cardiovascular: Normal rate  Pulmonary/Chest: Effort normal and breath sounds normal    Musculoskeletal: Normal range of motion  Neurological: He is alert and oriented to person, place, and time  He has normal reflexes  Skin: Skin is warm  Psychiatric: He has a normal mood and affect  His behavior is normal        Lab Review:   not applicable      Diagnostics:  I have personally reviewed pertinent reports  CT of chest performed on acute PE and distal left main pulmonary artery and proximal left lower lobe segmental branches  There were also stable right upper and left lower pulmonary nodules  Office Spirometry Results:     ESS:    Xr Chest 1 View Portable    Result Date: 2/14/2018  Narrative: CHEST INDICATION:  Increased shortness of breath this morning  COMPARISON:  12/30/2017, CT chest 1/24/2018 VIEWS:   AP frontal IMAGES:  1 FINDINGS: Cardiomediastinal silhouette appears unremarkable  The lungs are hyperinflated compatible with COPD  Nodular density right upper lung zone suggested overlying the right anterior 1st rib  This likely corresponds to a suspicious nodule on the recent CT study  A left lower lobe nodule described on prior CT imaging is not clearly evident on today's plain film  No confluent infiltrates    Linear atelectasis or scarring right lung base  No pneumothorax or pleural effusion  Paravertebral ossifications thoracic spine  Impression: COPD  Right upper lobe nodular density  See previous CT recommendations  No acute infiltrates  Workstation performed: [id number]     Nm Pet Ct Skull Base To Mid Thigh    Result Date: 2/12/2018  Narrative: PET/CT SCAN INDICATION: 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, bronchus and lung  History taken directly from the electronic ordering system  Abnormal chest CT, right upper and left lower lung nodules, evaluate for neoplasm MODIFIER: PI COMPARISON: CT chest 1/24/2018 CELL TYPE:  None TECHNIQUE:   8 8 mCi F-18-FDG administered IV  Multiplanar attenuation corrected and non attenuation corrected PET images are available for interpretation, and contiguous, low dose, axial CT sections were obtained from the vertex through the femurs following the administration of oral contrast material (7 5 cc Omnipaque-240 in 300 cc water)  Intravenous contrast material was not utilized  This examination, like all CT scans performed in the [facility], was performed utilizing techniques to minimize radiation dose exposure, including the use of iterative reconstruction and automated exposure control  Fasting serum glucose: 103 mg/dl FINDINGS: VISUALIZED BRAIN:   No acute abnormalities are seen  HEAD/NECK:   There is a physiologic distribution of FDG  No FDG avid cervical adenopathy is seen  CT images: Unremarkable  CHEST:   1 2 x 0 8 cm right upper lung nodule series 3 image 1:15 demonstrates hypermetabolism, SUV 3 4  This is most concerning for malignancy  1 3 x 0 6 cm left lower lung nodule series 3 image 159 does not demonstrate significant hypermetabolism  No additional hypermetabolic lesions  No hypermetabolic hilar or mediastinal adenopathy  CT images: Emphysema  Minimal coronary artery calcifications  ABDOMEN:   No FDG avid soft tissue lesions are seen   CT images: Left renal hypodensity favoring cyst  PELVIS: No FDG avid soft tissue lesions are seen  CT images: Bladder wall thickening may be exacerbated by under distention  OSSEOUS STRUCTURES: No FDG avid lesions are seen  CT images: Spine degenerative change  Impression: 1   1 2 x 0 8 cm right upper lung nodule demonstrates hypermetabolism and is most concerning for malignancy  Tissue sampling recommended  2   1 3 x 0 6 cm left lower lung nodule does not demonstrate significant hypermetabolism  In the absence of tissue sampling, 3 month CT follow-up is recommended to exclude a low-grade neoplasm  3   No hypermetabolic metastases visualized  Workstation performed: [id number]     Cta Ed Chest Pe Study    Result Date: 2/14/2018  Narrative: CTA - CHEST WITH IV CONTRAST - PULMONARY ANGIOGRAM INDICATION: Chest pain  Shortness of breath  COMPARISON: None  TECHNIQUE: CTA examination of the chest was performed using angiographic technique according to a protocol specifically tailored to evaluate for pulmonary embolism  Axial, sagittal, and coronal 2D reformatted images were created from the source data and  submitted for interpretation  In addition, coronal 3D MIP postprocessing was performed on the acquisition scanner  Radiation dose length product (DLP) for this visit:  554 mGy-cm   This examination, like all CT scans performed in the [facility], was performed utilizing techniques to minimize radiation dose exposure, including the use of iterative reconstruction and automated exposure control  IV Contrast:  85 mL of iohexol (OMNIPAQUE)  FINDINGS: PULMONARY ARTERIAL TREE:  Filling defects in the distal left main pulmonary artery and proximal lower lobe segmental branches  LUNGS:  Moderate panlobular emphysema with bullous emphysematous changes at the lung bases  Stable right upper lobe 1 2 cm pulmonary nodule    Left lower lobe 1 3 cm pulmonary nodule is stable, when accounting for differences in slice selection  No new pulmonary nodules  There is no tracheal or endobronchial lesion  PLEURA:  Unremarkable  HEART/AORTA:  Normal heart size  No thoracic aortic aneurysm  MEDIASTINUM AND MIKE:  No lymphadenopathy  CHEST WALL AND LOWER NECK:   Unremarkable  VISUALIZED STRUCTURES IN THE UPPER ABDOMEN:  Unremarkable  OSSEOUS STRUCTURES:  No acute fracture or destructive osseous lesion  Impression: 1  Acute pulmonary embolus in the distal left main pulmonary artery and proximal left lower lobe segmental branches  2   Moderate panlobular emphysema  3   Stable right upper and left lower pulmonary nodules measuring 1 2 x 1 3 cm  PET/CT was performed, based on current Fleischner Society 2017 Guidelines on incidental pulmonary nodule  Tissue sampling of the right upper lobe hypermetabolic nodule is planned  3 month follow-up of left lower lobe pulmonary nodule as previously recommended  I personally discussed this study with Dr Gita Waldrop on 2/14/2018 at 5:34 PM  Workstation performed: [id number]     Vas Lower Limb Venous Duplex Study, Complete Bilateral    Result Date: 2/16/2018  Narrative:  [facility] REPORT CLINICAL: Indications: Pulmonary Emboli [I26 99]  Patient presents with shortness of breath for the past several days  Operative History: Back surgery Right great saphenous vein ligation The patient has history of malignancy and previous smoking (quit <1yr ago)  Clinical:  FINDINGS:  Segment    Right            Left                  Impression       Impression                        CFV        Normal (Patent)  Normal (Patent)                   Popliteal                   E3 Occlusive Segmental (Chronic)     CONCLUSION: Impression: RIGHT LOWER LIMB: No evidence of superficial thrombophlebitis noted  Doppler evaluation shows a normal response to augmentation maneuvers  Popliteal, posterior tibial and anterior tibial arterial Doppler waveforms are biphasic    LEFT LOWER LIMB: Subacute to chronic non occlusive deep vein thrombosis of the popliteal vein with no propagation into the femoral or posterior tibial and peroneal veins    No evidence of superficial thrombophlebitis noted  Doppler evaluation shows a normal response to augmentation maneuvers  Popliteal, posterior tibial and anterior tibial arterial Doppler waveforms are biphasic    SIGNATURE: Electronically Signed by: Karlie Keane MD, RPVI on 2018-02-16 04:46:00 PM

## 2018-03-14 ENCOUNTER — CLINICAL SUPPORT (OUTPATIENT)
Dept: PULMONOLOGY | Facility: CLINIC | Age: 73
DRG: 189 | End: 2018-03-14
Payer: MEDICARE

## 2018-03-14 DIAGNOSIS — J44.9 MODERATE COPD (CHRONIC OBSTRUCTIVE PULMONARY DISEASE) (HCC): ICD-10-CM

## 2018-03-14 PROCEDURE — G0424 PULMONARY REHAB W EXER: HCPCS

## 2018-03-14 NOTE — PROGRESS NOTES
Pt had high BP while on the Nu-step  Pt was instructed to rest for a few minutes than return to exercise at level 3 on the nu-step for the duration of his time here

## 2018-03-16 ENCOUNTER — CLINICAL SUPPORT (OUTPATIENT)
Dept: PULMONOLOGY | Facility: CLINIC | Age: 73
DRG: 189 | End: 2018-03-16
Payer: MEDICARE

## 2018-03-16 DIAGNOSIS — J44.9 MODERATE COPD (CHRONIC OBSTRUCTIVE PULMONARY DISEASE) (HCC): ICD-10-CM

## 2018-03-16 PROCEDURE — G0424 PULMONARY REHAB W EXER: HCPCS

## 2018-03-19 ENCOUNTER — CLINICAL SUPPORT (OUTPATIENT)
Dept: PULMONOLOGY | Facility: CLINIC | Age: 73
DRG: 189 | End: 2018-03-19
Payer: MEDICARE

## 2018-03-19 ENCOUNTER — OFFICE VISIT (OUTPATIENT)
Dept: FAMILY MEDICINE CLINIC | Facility: CLINIC | Age: 73
End: 2018-03-19
Payer: MEDICARE

## 2018-03-19 VITALS
WEIGHT: 180.13 LBS | BODY MASS INDEX: 21.27 KG/M2 | SYSTOLIC BLOOD PRESSURE: 162 MMHG | HEIGHT: 77 IN | RESPIRATION RATE: 22 BRPM | DIASTOLIC BLOOD PRESSURE: 80 MMHG | OXYGEN SATURATION: 96 % | HEART RATE: 100 BPM | TEMPERATURE: 98 F

## 2018-03-19 DIAGNOSIS — J44.9 MODERATE COPD (CHRONIC OBSTRUCTIVE PULMONARY DISEASE) (HCC): Primary | ICD-10-CM

## 2018-03-19 DIAGNOSIS — R03.0 ELEVATED BLOOD PRESSURE READING: Primary | ICD-10-CM

## 2018-03-19 PROCEDURE — 99213 OFFICE O/P EST LOW 20 MIN: CPT | Performed by: NURSE PRACTITIONER

## 2018-03-19 RX ORDER — UMECLIDINIUM 62.5 UG/1
AEROSOL, POWDER ORAL
COMMUNITY
Start: 2018-03-05 | End: 2018-04-04 | Stop reason: HOSPADM

## 2018-03-19 NOTE — PROGRESS NOTES
Assessment/Plan:  1  Follow up on Wednesday for recheck for blood pressure--patient is on prednisone which could potentially elevated blood pressure and is going to a lower dose the next couple of days  2  Follow-up condition changes or worsens     Diagnoses and all orders for this visit:    Elevated blood pressure reading    Other orders  -     INCRUSE ELLIPTA 62 5 MCG/INH AEPB;           Subjective:      Patient ID: Madai Travis is a 75 y o  male  50-year-old male presents with elevated blood pressure today  Was at physical therapy and they told him he had to be seen today  Denies any headache/chest pain  Has been recently diagnosed with pulmonary embolism  Is on Eliquis  Patient currently is also on prednisone for his chronic emphysema/lung disease  The patient is short of breath but he reports that that his his baseline is getting better from when he had an appointment with pulmonary medicine last week  The following portions of the patient's history were reviewed and updated as appropriate: allergies and current medications  Review of Systems   Constitutional: Negative  HENT: Negative  Respiratory: Positive for cough and shortness of breath  Cardiovascular: Negative  Objective:      /80 (BP Location: Left arm, Patient Position: Sitting)   Pulse 100   Temp 98 °F (36 7 °C) (Tympanic)   Resp 22   Ht 6' 5" (1 956 m)   Wt 81 7 kg (180 lb 2 oz)   SpO2 96% Comment: on 3LO2 by nasal cannula  BMI 21 36 kg/m²          Physical Exam   Constitutional: He appears well-developed and well-nourished  Cardiovascular: Normal rate, regular rhythm and normal heart sounds  Pulmonary/Chest: He has no wheezes  Tachypnea/increased effort/2 L O2 via nasal cannula   Musculoskeletal: Normal range of motion

## 2018-03-19 NOTE — PROGRESS NOTES
Marycarmen Duval came in today with high blood pressure at 190/88  We advised him to not exercise today and Dr Triston Bello office was made aware of the issue and he was sent over for an appoint today

## 2018-03-21 ENCOUNTER — APPOINTMENT (OUTPATIENT)
Dept: PULMONOLOGY | Facility: CLINIC | Age: 73
DRG: 189 | End: 2018-03-21
Payer: MEDICARE

## 2018-03-23 ENCOUNTER — TELEPHONE (OUTPATIENT)
Dept: FAMILY MEDICINE CLINIC | Facility: CLINIC | Age: 73
End: 2018-03-23

## 2018-03-23 ENCOUNTER — APPOINTMENT (OUTPATIENT)
Dept: PULMONOLOGY | Facility: CLINIC | Age: 73
DRG: 189 | End: 2018-03-23
Payer: MEDICARE

## 2018-03-23 ENCOUNTER — OFFICE VISIT (OUTPATIENT)
Dept: FAMILY MEDICINE CLINIC | Facility: CLINIC | Age: 73
End: 2018-03-23
Payer: MEDICARE

## 2018-03-23 VITALS
WEIGHT: 177 LBS | OXYGEN SATURATION: 96 % | HEART RATE: 92 BPM | TEMPERATURE: 98 F | BODY MASS INDEX: 20.99 KG/M2 | DIASTOLIC BLOOD PRESSURE: 68 MMHG | SYSTOLIC BLOOD PRESSURE: 174 MMHG | RESPIRATION RATE: 18 BRPM

## 2018-03-23 DIAGNOSIS — I10 ESSENTIAL HYPERTENSION: Primary | ICD-10-CM

## 2018-03-23 PROCEDURE — 99213 OFFICE O/P EST LOW 20 MIN: CPT | Performed by: NURSE PRACTITIONER

## 2018-03-23 RX ORDER — AMLODIPINE BESYLATE 10 MG/1
10 TABLET ORAL DAILY
Qty: 30 TABLET | Refills: 0 | Status: SHIPPED | OUTPATIENT
Start: 2018-03-23 | End: 2018-05-24 | Stop reason: SDUPTHER

## 2018-03-23 NOTE — PROGRESS NOTES
Assessment/Plan:  1  After patient was walked around the facility patient became hypertensive with a blood pressure on the right arm: 190/76, L arm: 180/76  2  F/u in 1 week for re-ck  3  Start medication today  4  F/u if condition changes/worsens       Diagnoses and all orders for this visit:    Essential hypertension  -     amLODIPine (NORVASC) 10 mg tablet; Take 1 tablet (10 mg total) by mouth daily          Subjective:      Patient ID: Isreal Caal is a 75 y o  male  62-year-old male presents for follow-up high blood pressure  Denies Cp/headache  Taking medications as prescribed  Reports taking blood pressure home reports elevated at times however normal at times  The following portions of the patient's history were reviewed and updated as appropriate: allergies and current medications  Review of Systems   Constitutional: Negative  Respiratory: Positive for shortness of breath  Cardiovascular: Negative  Objective:      BP (!) 174/68   Pulse 92   Temp 98 °F (36 7 °C)   Resp 18   Wt 80 3 kg (177 lb)   SpO2 96%   BMI 20 99 kg/m²          Physical Exam   Constitutional: He appears well-developed and well-nourished     Pulmonary/Chest:   Tachypnea /2 L via nasal cannula

## 2018-03-23 NOTE — TELEPHONE ENCOUNTER
reagan Carrasquillo brought insurance form  For dr Ahsan Dewitt to fill  Original at your door  [employer] desk    [phone]

## 2018-03-26 ENCOUNTER — CLINICAL SUPPORT (OUTPATIENT)
Dept: PULMONOLOGY | Facility: CLINIC | Age: 73
DRG: 189 | End: 2018-03-26
Payer: MEDICARE

## 2018-03-26 DIAGNOSIS — J44.9 MODERATE COPD (CHRONIC OBSTRUCTIVE PULMONARY DISEASE) (HCC): ICD-10-CM

## 2018-03-26 PROCEDURE — G0424 PULMONARY REHAB W EXER: HCPCS

## 2018-03-28 ENCOUNTER — CLINICAL SUPPORT (OUTPATIENT)
Dept: PULMONOLOGY | Facility: CLINIC | Age: 73
DRG: 189 | End: 2018-03-28
Payer: MEDICARE

## 2018-03-28 DIAGNOSIS — J44.9 MODERATE COPD (CHRONIC OBSTRUCTIVE PULMONARY DISEASE) (HCC): Primary | ICD-10-CM

## 2018-03-28 PROCEDURE — G0424 PULMONARY REHAB W EXER: HCPCS

## 2018-03-28 NOTE — PROGRESS NOTES
Cardiac/Pulmonary Rehabilitation Plan of Care    60 day      Today's date: 3/28/2018   Visits: 11  Patient name: Kim Del Angel      : 1945  Age: 75 y o  MRN: [id number]  Referring Physician: José Coon MD  Provider: [facility]  Clinician: Blaze Nieto RN    Dx:   Encounter Diagnosis   Name Primary?  Moderate COPD (chronic obstructive pulmonary disease) (HCC) Yes     Date of onset: 2004    Medication compliance: Yes   Comments: none  Fall Risk: Yes   Comments: Oxygen tubing, and dyspnea with minimal exertion    EXERCISE/ACTIVITY    Cardiovascular:   Min: 56   METS: 2 1   Hr: 107   RPE: 7   O2 sat: 93-97   Modalities: Treadmill, AD bike, UBE, NuStep and Recumbent bike  Strength trainin-3 days / week, 12-15 repitations  and 1-2 sets per modality    Modalities: Arm curl and upright rows, shrugs lateral and frontal raise  EKG changes: N/A  Dyspnea score: 7  Home activity: none  Goals: Increase endurance and strength to return to the club house to play pool, ping pong and shuffle board  Increase endurance to walk a longer distance than 995 feet in 6 minutes in 12 weeks  Education: explained pulmonary rehabilitation and how to use cardiovascular equipment, discussed relaxation breathing  Plan: treadmill, 1 5 mph with a 2 % incline 15 minutes, recumbent bike 15 minutes level 2 or airdyne bike 10 minutes, arm ergometer 5 to 10 minutes level 2, nu-step for 15 minutes level 4   Increase time and resistance as tolerated  Readiness to change: 10    NUTRITION    Weight control:    Starting weight: 176 pounds   Current weight: 177 5  Waist circumference:    Startin 5 inches   Current:    Diabetes: none  Lipid management: none  Goals: choose lower fat beef products and processed foods  Education: rate your plate, discussed sodium and fat in foods  Plan: decreased the amount of fat and processed food in diet  Readiness to change: 2    PSYCHOSOCIAL    Emotional: Self-reported stress level: 5   Social support: daughter  Goals: improve depression by increasing endurance to return to activities at the clubhouse  Education: none  Plan: provide positive encouragement and relaxation techniques  Readiness to change: 5    OTHER CORE COMPONENTS     Tobacco:   History   Smoking Status    Former Smoker    Packs/day: 1 00    Years: 60 00    Quit date: 8/31/2017   Smokeless Tobacco    Never Used     Comment: quit in august 2017     Blood pressure:    Resting: Resting BP: 154/80   Exercise: Recovery BP: 126/70  Goals: none  Education: none  Plan: none  Readiness to change: 1   CAT score 26  Comments: On 2/14/2018 Pt was very short of breath during Pulmonary Rehabilitation states he is short of breath RPD 4  Contacted Dr Blas Hernandez office and they informed him to go to ER for evaluation  Pt refuse squad at first, stated he will drive himself  O2 Sat 99% on 3l/min via nc  Audible wheeze  He was transferred to [address] [place] [address] Via ambulance  HE stated he had a Pulmonary embolism, was hospitalized from 2/14/2018 to 2/18/2018  He returned to pulmonary rehabilitation on 2/23/18  He completes 44 minutes of cardiovascular exercise with a light weight strength training component  He continues to wear supplemental oxygen at 3l/min at rest and with exercise  His exercise MET level increased from 1 9 to 2 1 MET's  RPE 2-5 RPE 3 to 7  Audible wheezing at rest and with exercise  On 3/19/2018 he was very hypertensive and sent to MD office for evaluation  He missed several session due to hypertension  Attends weekly educational sessions  Will continue to monitor and increase workload as tolerated

## 2018-03-29 ENCOUNTER — HOSPITAL ENCOUNTER (INPATIENT)
Facility: HOSPITAL | Age: 73
LOS: 5 days | Discharge: NURSING, REHAB OR LONG-TERM CARE | DRG: 189 | End: 2018-04-04
Attending: EMERGENCY MEDICINE | Admitting: FAMILY MEDICINE
Payer: MEDICARE

## 2018-03-29 ENCOUNTER — OFFICE VISIT (OUTPATIENT)
Dept: PULMONOLOGY | Facility: MEDICAL CENTER | Age: 73
End: 2018-03-29
Payer: MEDICARE

## 2018-03-29 ENCOUNTER — APPOINTMENT (EMERGENCY)
Dept: RADIOLOGY | Facility: HOSPITAL | Age: 73
DRG: 189 | End: 2018-03-29
Payer: MEDICARE

## 2018-03-29 VITALS
TEMPERATURE: 98.3 F | DIASTOLIC BLOOD PRESSURE: 80 MMHG | OXYGEN SATURATION: 93 % | HEART RATE: 94 BPM | WEIGHT: 178 LBS | BODY MASS INDEX: 21.02 KG/M2 | SYSTOLIC BLOOD PRESSURE: 158 MMHG | HEIGHT: 77 IN

## 2018-03-29 DIAGNOSIS — I26.99 OTHER ACUTE PULMONARY EMBOLISM WITHOUT ACUTE COR PULMONALE (HCC): ICD-10-CM

## 2018-03-29 DIAGNOSIS — J44.1 COPD EXACERBATION (HCC): ICD-10-CM

## 2018-03-29 DIAGNOSIS — E88.01 AAT (ALPHA-1-ANTITRYPSIN) DEFICIENCY (HCC): ICD-10-CM

## 2018-03-29 DIAGNOSIS — R06.02 SOB (SHORTNESS OF BREATH): ICD-10-CM

## 2018-03-29 DIAGNOSIS — J96.01 ACUTE HYPOXEMIC RESPIRATORY FAILURE (HCC): Primary | ICD-10-CM

## 2018-03-29 DIAGNOSIS — J20.8 ACUTE BRONCHITIS DUE TO OTHER SPECIFIED ORGANISMS: ICD-10-CM

## 2018-03-29 DIAGNOSIS — J96.11 CHRONIC HYPOXEMIC RESPIRATORY FAILURE (HCC): ICD-10-CM

## 2018-03-29 DIAGNOSIS — J96.01 ACUTE HYPOXEMIC RESPIRATORY FAILURE (HCC): ICD-10-CM

## 2018-03-29 DIAGNOSIS — R91.8 LUNG NODULES: ICD-10-CM

## 2018-03-29 DIAGNOSIS — J44.1 COPD WITH ACUTE EXACERBATION (HCC): ICD-10-CM

## 2018-03-29 DIAGNOSIS — R06.00 DYSPNEA ON EXERTION: Primary | ICD-10-CM

## 2018-03-29 LAB
ANION GAP SERPL CALCULATED.3IONS-SCNC: 11 MMOL/L (ref 4–13)
APTT PPP: 26 SECONDS (ref 24–33)
ARTERIAL PATENCY WRIST A: YES
BASE EXCESS BLDA CALC-SCNC: -0.9 MMOL/L
BASOPHILS # BLD AUTO: 0 THOUSANDS/ΜL (ref 0–0.1)
BASOPHILS NFR BLD AUTO: 0 % (ref 0–1)
BODY TEMPERATURE: 99.2 DEGREES FEHRENHEIT
BUN SERPL-MCNC: 19 MG/DL (ref 5–25)
CALCIUM SERPL-MCNC: 9.6 MG/DL (ref 8.3–10.1)
CHLORIDE SERPL-SCNC: 103 MMOL/L (ref 100–108)
CO2 SERPL-SCNC: 25 MMOL/L (ref 21–32)
CREAT SERPL-MCNC: 1.44 MG/DL (ref 0.6–1.3)
EOSINOPHIL # BLD AUTO: 0.1 THOUSAND/ΜL (ref 0–0.61)
EOSINOPHIL NFR BLD AUTO: 2 % (ref 0–6)
ERYTHROCYTE [DISTWIDTH] IN BLOOD BY AUTOMATED COUNT: 14.1 % (ref 11.6–15.1)
GFR SERPL CREATININE-BSD FRML MDRD: 47 ML/MIN/1.73SQ M
GLUCOSE SERPL-MCNC: 133 MG/DL (ref 65–140)
HCO3 BLDA-SCNC: 22.9 MMOL/L (ref 22–28)
HCT VFR BLD AUTO: 41.8 % (ref 42–52)
HGB BLD-MCNC: 14.1 G/DL (ref 14–18)
INR PPP: 1.08 (ref 0.86–1.16)
IPAP: 12
LYMPHOCYTES # BLD AUTO: 1.2 THOUSANDS/ΜL (ref 0.6–4.47)
LYMPHOCYTES NFR BLD AUTO: 16 % (ref 14–44)
MCH RBC QN AUTO: 32.6 PG (ref 27–31)
MCHC RBC AUTO-ENTMCNC: 33.8 G/DL (ref 31.4–37.4)
MCV RBC AUTO: 96 FL (ref 82–98)
MONOCYTES # BLD AUTO: 0.7 THOUSAND/ΜL (ref 0.17–1.22)
MONOCYTES NFR BLD AUTO: 9 % (ref 4–12)
NEUTROPHILS # BLD AUTO: 5.2 THOUSANDS/ΜL (ref 1.85–7.62)
NEUTS SEG NFR BLD AUTO: 72 % (ref 43–75)
NON VENT- BIPAP: ABNORMAL
NRBC BLD AUTO-RTO: 0 /100 WBCS
NT-PROBNP SERPL-MCNC: 141 PG/ML
O2 CT BLDA-SCNC: 20 ML/DL (ref 16–23)
OXYHGB MFR BLDA: 98.4 % (ref 94–97)
PCO2 BLDA: 35.4 MM HG (ref 36–44)
PCO2 TEMP ADJ BLDA: 35.9 MM HG (ref 36–44)
PEEP MAX SETTING VENT: 6 CM[H2O]
PH BLD: 7.42 [PH] (ref 7.35–7.45)
PH BLDA: 7.43 [PH] (ref 7.35–7.45)
PLATELET # BLD AUTO: 155 THOUSANDS/UL (ref 130–400)
PMV BLD AUTO: 6.1 FL (ref 8.9–12.7)
PO2 BLD: 198.3 MM HG (ref 75–129)
PO2 BLDA: 196.7 MM HG (ref 75–129)
POTASSIUM SERPL-SCNC: 3.5 MMOL/L (ref 3.5–5.3)
PROTHROMBIN TIME: 11.4 SECONDS (ref 9.4–11.7)
RBC # BLD AUTO: 4.34 MILLION/UL (ref 4.7–6.1)
SODIUM SERPL-SCNC: 139 MMOL/L (ref 136–145)
SPECIMEN SOURCE: ABNORMAL
TROPONIN I SERPL-MCNC: <0.02 NG/ML
VENT BIPAP FIO2: 50 %
WBC # BLD AUTO: 7.2 THOUSAND/UL (ref 4.8–10.8)

## 2018-03-29 PROCEDURE — 84484 ASSAY OF TROPONIN QUANT: CPT | Performed by: EMERGENCY MEDICINE

## 2018-03-29 PROCEDURE — 96374 THER/PROPH/DIAG INJ IV PUSH: CPT

## 2018-03-29 PROCEDURE — 36415 COLL VENOUS BLD VENIPUNCTURE: CPT | Performed by: EMERGENCY MEDICINE

## 2018-03-29 PROCEDURE — 99222 1ST HOSP IP/OBS MODERATE 55: CPT | Performed by: FAMILY MEDICINE

## 2018-03-29 PROCEDURE — 85610 PROTHROMBIN TIME: CPT | Performed by: EMERGENCY MEDICINE

## 2018-03-29 PROCEDURE — 99285 EMERGENCY DEPT VISIT HI MDM: CPT

## 2018-03-29 PROCEDURE — 85730 THROMBOPLASTIN TIME PARTIAL: CPT | Performed by: EMERGENCY MEDICINE

## 2018-03-29 PROCEDURE — 36600 WITHDRAWAL OF ARTERIAL BLOOD: CPT

## 2018-03-29 PROCEDURE — 71045 X-RAY EXAM CHEST 1 VIEW: CPT

## 2018-03-29 PROCEDURE — 93005 ELECTROCARDIOGRAM TRACING: CPT

## 2018-03-29 PROCEDURE — 94760 N-INVAS EAR/PLS OXIMETRY 1: CPT

## 2018-03-29 PROCEDURE — 94010 BREATHING CAPACITY TEST: CPT | Performed by: INTERNAL MEDICINE

## 2018-03-29 PROCEDURE — 87081 CULTURE SCREEN ONLY: CPT | Performed by: FAMILY MEDICINE

## 2018-03-29 PROCEDURE — 82805 BLOOD GASES W/O2 SATURATION: CPT | Performed by: EMERGENCY MEDICINE

## 2018-03-29 PROCEDURE — 83880 ASSAY OF NATRIURETIC PEPTIDE: CPT | Performed by: EMERGENCY MEDICINE

## 2018-03-29 PROCEDURE — 99214 OFFICE O/P EST MOD 30 MIN: CPT | Performed by: INTERNAL MEDICINE

## 2018-03-29 PROCEDURE — 94660 CPAP INITIATION&MGMT: CPT

## 2018-03-29 PROCEDURE — 85025 COMPLETE CBC W/AUTO DIFF WBC: CPT | Performed by: EMERGENCY MEDICINE

## 2018-03-29 PROCEDURE — 80048 BASIC METABOLIC PNL TOTAL CA: CPT | Performed by: EMERGENCY MEDICINE

## 2018-03-29 PROCEDURE — 96375 TX/PRO/DX INJ NEW DRUG ADDON: CPT

## 2018-03-29 RX ORDER — AZITHROMYCIN 250 MG/1
250 TABLET, FILM COATED ORAL SEE ADMIN INSTRUCTIONS
Qty: 6 TABLET | Refills: 0 | Status: ON HOLD | OUTPATIENT
Start: 2018-03-29 | End: 2018-03-30

## 2018-03-29 RX ORDER — LORAZEPAM 2 MG/ML
0.5 INJECTION INTRAMUSCULAR ONCE
Status: COMPLETED | OUTPATIENT
Start: 2018-03-29 | End: 2018-03-29

## 2018-03-29 RX ORDER — METHYLPREDNISOLONE SODIUM SUCCINATE 125 MG/2ML
125 INJECTION, POWDER, LYOPHILIZED, FOR SOLUTION INTRAMUSCULAR; INTRAVENOUS ONCE
Status: COMPLETED | OUTPATIENT
Start: 2018-03-29 | End: 2018-03-29

## 2018-03-29 RX ADMIN — METHYLPREDNISOLONE SODIUM SUCCINATE 125 MG: 125 INJECTION, POWDER, FOR SOLUTION INTRAMUSCULAR; INTRAVENOUS at 19:23

## 2018-03-29 RX ADMIN — LORAZEPAM 0.5 MG: 2 INJECTION INTRAMUSCULAR; INTRAVENOUS at 19:50

## 2018-03-29 NOTE — ED PROVIDER NOTES
History  Chief Complaint   Patient presents with    Shortness of Breath     Pt with ongoing problem of SOB since christmas, saw Dr Elina Renteria this morning and has gotten worse since then ,       History provided by:  Patient  Shortness of Breath   Severity:  Moderate  Onset quality:  Sudden  Timing:  Constant  Progression:  Worsening  Chronicity:  New  Context: activity    Relieved by:  Nothing  Worsened by: Activity, coughing and deep breathing  Ineffective treatments:  None tried  Associated symptoms: wheezing    Associated symptoms: no abdominal pain, no chest pain, no cough, no fever, no headaches, no rash and no sore throat    Associated symptoms comment:  67-year-old male presents emergency department symptoms of shortness of breath history of COPD  Noted with respiratory distress on initial presentation into the emergency department placed on BiPAP for further management evaluation  X-ray performed is unremarkable possibly show some atelectasis in the right lower lobe  Was seen by Pulmonary earlier today for evaluation recently on steroids and antibiotics for possible bronchitis but stopped yesterday  Prior to Admission Medications   Prescriptions Last Dose Informant Patient Reported? Taking?    Alpha1-Proteinase Inhibitor (ZEMAIRA IV)  Self Yes Yes   Sig: Infuse into a venous catheter once a week On tuesday -1000mg /50 ml    INCRUSE ELLIPTA 62 5 MCG/INH AEPB   Yes Yes   albuterol (2 5 mg/3 mL) 0 083 % nebulizer solution   Yes Yes   Sig: Inhale 1 each every 4 (four) hours as needed   albuterol (PROVENTIL HFA,VENTOLIN HFA) 90 mcg/act inhaler   No Yes   Sig: Inhale 2 puffs every 6 (six) hours as needed for wheezing   amLODIPine (NORVASC) 10 mg tablet   No Yes   Sig: Take 1 tablet (10 mg total) by mouth daily   apixaban (ELIQUIS) 5 mg   No Yes   Sig: Take 1 tablet (5 mg total) by mouth 2 (two) times a day   azithromycin (ZITHROMAX) 250 mg tablet   No Yes   Sig: Take 1 tablet (250 mg total) by mouth see administration instructions for 5 days Take 2 tablets 1st day then 1 tablet daily for 4 days   fluticasone (FLONASE) 50 mcg/act nasal spray   Yes Yes   fluticasone-salmeterol (ADVAIR DISKUS) 250-50 mcg/dose inhaler   Yes Yes   Sig: Inhale   meclizine (ANTIVERT) 12 5 MG tablet   Yes Yes   Sig: Take 1 tablet by mouth daily   omeprazole (PriLOSEC) 40 MG capsule   Yes Yes   predniSONE 10 mg tablet   No Yes   Si tabs daily x4 days, 3 tabs daily x 4 days, 2 tabs daily x 4 days 1 tab daily x 4 days  Extra tabs  tamsulosin (FLOMAX) 0 4 mg  Self Yes Yes   Sig: Take 0 4 mg by mouth daily at bedtime   zolpidem (AMBIEN CR) 6 25 MG CR tablet   Yes Yes   Sig: Take 10 mg by mouth daily at bedtime as needed for sleep      Facility-Administered Medications: None       Past Medical History:   Diagnosis Date    Anesthesia complication     Difficult to wake up    Arthritis     BPH (benign prostatic hyperplasia)     urinary frequency    Cancer (HCC)     basal cell neck, face    COPD (chronic obstructive pulmonary disease) (HCC)     Full dentures     Hypertension     controlled    Kidney stone     at least 7 episodes    Liver disease     Alpha 1- enzyme deficiency - diagnosed   has been on weekly replacement therapy since then    Pulmonary emphysema ([facility] [address] )     1/25/15  FEV1 - 2 45 liters or 59% of predicted    Wears glasses     for driving only       Past Surgical History:   Procedure Laterality Date    BACK SURGERY      discectomy    COLONOSCOPY      COLONOSCOPY N/A 3/10/2017    Procedure: Avel Yi;  Surgeon: Marylene Alstrom, MD;  Location: [place] [address] GI LAB; Service:    Joey Lorenz      removal of kidney stones    ESOPHAGOGASTRODUODENOSCOPY N/A 3/10/2017    Procedure: ESOPHAGOGASTRODUODENOSCOPY (EGD); Surgeon: Marylene Alstrom, MD;  Location: [facility] GI LAB;   Service:     LITHOTRIPSY      TONSILLECTOMY      VEIN LIGATION AND STRIPPING Bilateral            Family History   Problem Relation Age of Onset    Emphysema Mother      never smoked    Emphysema Father     Cancer Brother      colon    Colon cancer Brother     Ulcerative colitis Family     Liver disease Family      I have reviewed and agree with the history as documented  Social History   Substance Use Topics    Smoking status: Former Smoker     Packs/day: 1 00     Years: 60 00     Quit date: 8/31/2017    Smokeless tobacco: Never Used      Comment: quit in august 2017    Alcohol use No        Review of Systems   Constitutional: Negative for chills and fever  HENT: Negative for rhinorrhea, sore throat and trouble swallowing  Eyes: Negative for pain  Respiratory: Positive for shortness of breath and wheezing  Negative for cough and stridor  Cardiovascular: Negative for chest pain and leg swelling  Gastrointestinal: Negative for abdominal pain, diarrhea and nausea  Endocrine: Negative for polyuria  Genitourinary: Negative for dysuria, flank pain and urgency  Musculoskeletal: Negative for joint swelling, myalgias and neck stiffness  Skin: Negative for rash  Allergic/Immunologic: Negative for immunocompromised state  Neurological: Negative for dizziness, syncope, weakness, numbness and headaches  Psychiatric/Behavioral: Negative for confusion and suicidal ideas  All other systems reviewed and are negative  Physical Exam  ED Triage Vitals [03/29/18 1914]   Temperature Pulse Respirations Blood Pressure SpO2   99 2 °F (37 3 °C) 102 (!) 32 (!) 178/78 (!) 89 %      Temp Source Heart Rate Source Patient Position - Orthostatic VS BP Location FiO2 (%)   Tympanic Monitor Sitting Right arm --      Pain Score       No Pain           Orthostatic Vital Signs  Vitals:    03/29/18 2115 03/29/18 2130 03/29/18 2145 03/29/18 2217   BP: 142/66 145/79  168/77   Pulse: 84 82 82 82   Patient Position - Orthostatic VS:    Lying       Physical Exam   Constitutional: He is oriented to person, place, and time   He appears well-developed and well-nourished  HENT:   Head: Normocephalic and atraumatic  Eyes: EOM are normal  Pupils are equal, round, and reactive to light  Neck: Normal range of motion  Neck supple  Cardiovascular: Normal rate and regular rhythm  Exam reveals no friction rub  No murmur heard  Pulmonary/Chest: Breath sounds normal  Tachypnea noted  He is in respiratory distress  He has no wheezes  He has no rales  Abdominal: Soft  Bowel sounds are normal  He exhibits no distension  There is no tenderness  Musculoskeletal: Normal range of motion  He exhibits no edema or tenderness  Neurological: He is alert and oriented to person, place, and time  Skin: Skin is warm  No rash noted  Psychiatric: He has a normal mood and affect  Nursing note and vitals reviewed        ED Medications  Medications    EMS REPLENISHMENT MED ( Does not apply Given to EMS 3/29/18 1919)   methylPREDNISolone sodium succinate (Solu-MEDROL) injection 125 mg (125 mg Intravenous Given 3/29/18 1923)   LORazepam (ATIVAN) 2 mg/mL injection 0 5 mg (0 5 mg Intravenous Given 3/29/18 1950)       Diagnostic Studies  Results Reviewed     Procedure Component Value Units Date/Time    Blood gas, arterial [[id number]]  (Abnormal) Collected:  03/29/18 2033    Lab Status:  Final result Specimen:  Blood, Arterial from Radial, Left Updated:  03/29/18 2038     pH, Arterial 7 429     PH ART TC 7 425     pCO2, Arterial 35 4 (L) mm Hg      PCO2 (TC) Arterial 35 9 (L) mm Hg      pO2, Arterial 196 7 (H) mm Hg      PO2 (TC) Arterial 198 3 (H) mm Hg      HCO3, Arterial 22 9 mmol/L      Base Excess, Arterial -0 9 mmol/L      O2 Content, Arterial 20 0 mL/dL      O2 HGB,Arterial  98 4 (H) %      SOURCE Radial, Left     [place]OR[place] TEST Yes     Temperature 99 2 Degrees Fehrenheit      Non Vent type BIPAP BIPAP     IPAP 12     EPAP 6     BIPAP fio2 50 %     Basic metabolic panel [[id number]]  (Abnormal) Collected:  03/29/18 1921    Lab Status:  Final result Specimen: Blood from Arm, Left Updated:  03/29/18 1953     Sodium 139 mmol/L      Potassium 3 5 mmol/L      Chloride 103 mmol/L      CO2 25 mmol/L      Anion Gap 11 mmol/L      BUN 19 mg/dL      Creatinine 1 44 (H) mg/dL      Glucose 133 mg/dL      Calcium 9 6 mg/dL      eGFR 47 ml/min/1 73sq m     Narrative:         National Kidney Disease Education Program recommendations are as follows:  GFR calculation is accurate only with a steady state creatinine  Chronic Kidney disease less than 60 ml/min/1 73 sq  meters  Kidney failure less than 15 ml/min/1 73 sq  meters  NT-BNP PRO [[id number]]  (Abnormal) Collected:  03/29/18 1921    Lab Status:  Final result Specimen:  Blood from Arm, Left Updated:  03/29/18 1953     NT-proBNP 141 (H) pg/mL     Troponin I [[id number]]  (Normal) Collected:  03/29/18 1921    Lab Status:  Final result Specimen:  Blood from Arm, Left Updated:  03/29/18 1946     Troponin I <0 02 ng/mL     Narrative:         [employer] Chemistry analyzer 99% cutoff is > 0 04 ng/mL in network labs    o cTnI 99% cutoff is useful only when applied to patients in the clinical setting of myocardial ischemia  o cTnI 99% cutoff should be interpreted in the context of clinical history, ECG findings and possibly cardiac imaging to establish correct diagnosis  o cTnI 99% cutoff may be suggestive but clearly not indicative of a coronary event without the clinical setting of myocardial ischemia  [facility] [[id number]]  (Normal) Collected:  03/29/18 1921    Lab Status:  Final result Specimen:  Blood from Arm, Left Updated:  03/29/18 1943     Protime 11 4 seconds      INR 1 08    APTT [[id number]]  (Normal) Collected:  03/29/18 1921    Lab Status:  Final result Specimen:  Blood from Arm, Left Updated:  03/29/18 1943     PTT 26 seconds     Narrative:          Therapeutic Heparin Range = 60-90 seconds    CBC and differential [[id number]]  (Abnormal) Collected:  03/29/18 1921    Lab Status:  Final result Specimen:  Blood from Arm, Left Updated:  03/29/18 1926     WBC 7 20 Thousand/uL      RBC 4 34 (L) Million/uL      Hemoglobin 14 1 g/dL      Hematocrit 41 8 (L) %      MCV 96 fL      MCH 32 6 (H) pg      MCHC 33 8 g/dL      RDW 14 1 %      MPV 6 1 (L) fL      Platelets 388 Thousands/uL      nRBC 0 /100 WBCs      Neutrophils Relative 72 %      Lymphocytes Relative 16 %      Monocytes Relative 9 %      Eosinophils Relative 2 %      Basophils Relative 0 %      Neutrophils Absolute 5 20 Thousands/µL      Lymphocytes Absolute 1 20 Thousands/µL      Monocytes Absolute 0 70 Thousand/µL      Eosinophils Absolute 0 10 Thousand/µL      Basophils Absolute 0 00 Thousands/µL                  XR chest portable   Final Result by Anoop Cosby DO (03/29 2017)      Stable chronic changes within the lung fields  No pneumonia or congestive failure              Workstation performed: [id number]                    Procedures  CriticalCare Time  Performed by: Henrique Hinton  Authorized by: Henrique Hinton     Critical care provider statement:     Critical care time (minutes):  60    Critical care time was exclusive of:  Separately billable procedures and treating other patients and teaching time    Critical care was necessary to treat or prevent imminent or life-threatening deterioration of the following conditions:  Respiratory failure (Placed on BiPAP initially for respiratory distress improved)    Critical care was time spent personally by me on the following activities:  Blood draw for specimens, obtaining history from patient or surrogate, development of treatment plan with patient or surrogate, evaluation of patient's response to treatment, examination of patient, ordering and performing treatments and interventions, ordering and review of laboratory studies, ordering and review of radiographic studies, re-evaluation of patient's condition and review of old charts           Phone Contacts  ED Phone Contact    ED Course  ED Course as of Mar 29 2221   Thu Mar 29, 2018 2057 Taken off bipap, improved and abg unremarkable   Trial off bipap at this time, improvement with steroids                                   MDM  Number of Diagnoses or Management Options  COPD with acute exacerbation [facility]): new and requires workup  SOB (shortness of breath): new and requires workup  Diagnosis management comments: 10:21 PM   Spoke with the admitting team  Accepted the pt for admission  Spoke with the pt and they are in agreement to stay for further eval and admission     Patient initially on respiratory distress and BiPAP improved with medications as well as observation in the emergency department        Amount and/or Complexity of Data Reviewed  Clinical lab tests: reviewed and ordered  Tests in the radiology section of CPT®: ordered and reviewed  Review and summarize past medical records: yes      CritCare Time    Disposition  Final diagnoses:   SOB (shortness of breath)   COPD with acute exacerbation ([place] [address] )     Time reflects when diagnosis was documented in both MDM as applicable and the Disposition within this note     Time User Action Codes Description Comment    3/29/2018  9:28 PM Kate Henderson Add [R06 02] SOB (shortness of breath)     3/29/2018  9:28 PM Clive Courtney Add [J44 1] COPD with acute exacerbation [facility])       ED Disposition     ED Disposition Condition Comment    Admit        Follow-up Information    None       Current Discharge Medication List      CONTINUE these medications which have NOT CHANGED    Details   albuterol (2 5 mg/3 mL) 0 083 % nebulizer solution Inhale 1 each every 4 (four) hours as needed      albuterol (PROVENTIL HFA,VENTOLIN HFA) 90 mcg/act inhaler Inhale 2 puffs every 6 (six) hours as needed for wheezing  Qty: 1 Inhaler, Refills: 0    Comments: Please substitute based on patient insurance  Associated Diagnoses: Chronic obstructive pulmonary disease, unspecified COPD type ([place] [address] )      Alpha1-Proteinase Inhibitor (ZEMAIRA IV) Infuse into a venous catheter once a week On tuesday -1000mg /50 ml       amLODIPine (NORVASC) 10 mg tablet Take 1 tablet (10 mg total) by mouth daily  Qty: 30 tablet, Refills: 0    Associated Diagnoses: Essential hypertension      apixaban (ELIQUIS) 5 mg Take 1 tablet (5 mg total) by mouth 2 (two) times a day  Qty: 60 tablet, Refills: 0    Associated Diagnoses: Pulmonary embolism (HCC)      azithromycin (ZITHROMAX) 250 mg tablet Take 1 tablet (250 mg total) by mouth see administration instructions for 5 days Take 2 tablets 1st day then 1 tablet daily for 4 days  Qty: 6 tablet, Refills: 0    Associated Diagnoses: Acute bronchitis due to other specified organisms      fluticasone (FLONASE) 50 mcg/act nasal spray       fluticasone-salmeterol (ADVAIR DISKUS) 250-50 mcg/dose inhaler Inhale      INCRUSE ELLIPTA 62 5 MCG/INH AEPB       meclizine (ANTIVERT) 12 5 MG tablet Take 1 tablet by mouth daily      omeprazole (PriLOSEC) 40 MG capsule       predniSONE 10 mg tablet 4 tabs daily x4 days, 3 tabs daily x 4 days, 2 tabs daily x 4 days 1 tab daily x 4 days  Extra tabs  Qty: 50 tablet, Refills: 0    Associated Diagnoses: COPD with exacerbation ([address] )      tamsulosin (FLOMAX) 0 4 mg Take 0 4 mg by mouth daily at bedtime      zolpidem (AMBIEN CR) 6 25 MG CR tablet Take 10 mg by mouth daily at bedtime as needed for sleep           No discharge procedures on file      ED Provider  Electronically Signed by           Fleet Jeans, DO  03/29/18 5862

## 2018-03-29 NOTE — PATIENT INSTRUCTIONS
Schedule echocardiogram to assess year cardiac function and pulmonary artery pressure    Take 5 day antibiotic pack azithromycin    If her breathing becomes worse over next few days just go to the emergency room    If your symptoms of achiness in her extremities and shortness of Breath worsen consideration would be to stop Eliquis and start on Lovenox injection 120 mg subcu once a day and then start warfarin therapy    Also if your breathing become slightly worse you can try taking prednisone 10 mg - 2 tablets for 5 days

## 2018-03-30 ENCOUNTER — APPOINTMENT (OUTPATIENT)
Dept: NON INVASIVE DIAGNOSTICS | Facility: HOSPITAL | Age: 73
DRG: 189 | End: 2018-03-30
Payer: MEDICARE

## 2018-03-30 ENCOUNTER — APPOINTMENT (OUTPATIENT)
Dept: RADIOLOGY | Facility: HOSPITAL | Age: 73
DRG: 189 | End: 2018-03-30
Payer: MEDICARE

## 2018-03-30 ENCOUNTER — APPOINTMENT (OUTPATIENT)
Dept: PULMONOLOGY | Facility: CLINIC | Age: 73
DRG: 189 | End: 2018-03-30
Payer: MEDICARE

## 2018-03-30 PROBLEM — R42 VERTIGO: Chronic | Status: ACTIVE | Noted: 2018-03-30

## 2018-03-30 PROBLEM — N17.9 ACUTE KIDNEY INJURY (HCC): Status: ACTIVE | Noted: 2018-03-30

## 2018-03-30 LAB
ANION GAP SERPL CALCULATED.3IONS-SCNC: 11 MMOL/L (ref 4–13)
ATRIAL RATE: 99 BPM
BUN SERPL-MCNC: 20 MG/DL (ref 5–25)
CALCIUM SERPL-MCNC: 8.8 MG/DL (ref 8.3–10.1)
CHLORIDE SERPL-SCNC: 102 MMOL/L (ref 100–108)
CO2 SERPL-SCNC: 22 MMOL/L (ref 21–32)
CREAT SERPL-MCNC: 1.21 MG/DL (ref 0.6–1.3)
ERYTHROCYTE [DISTWIDTH] IN BLOOD BY AUTOMATED COUNT: 14.1 % (ref 11.6–15.1)
GFR SERPL CREATININE-BSD FRML MDRD: 59 ML/MIN/1.73SQ M
GLUCOSE SERPL-MCNC: 165 MG/DL (ref 65–140)
HCT VFR BLD AUTO: 37.5 % (ref 42–52)
HGB BLD-MCNC: 12.6 G/DL (ref 14–18)
L PNEUMO1 AG UR QL IA.RAPID: NEGATIVE
MAGNESIUM SERPL-MCNC: 1.8 MG/DL (ref 1.6–2.6)
MCH RBC QN AUTO: 32.4 PG (ref 27–31)
MCHC RBC AUTO-ENTMCNC: 33.7 G/DL (ref 31.4–37.4)
MCV RBC AUTO: 96 FL (ref 82–98)
PHOSPHATE SERPL-MCNC: 3 MG/DL (ref 2.3–4.1)
PLATELET # BLD AUTO: 138 THOUSANDS/UL (ref 130–400)
PMV BLD AUTO: 6.5 FL (ref 8.9–12.7)
POTASSIUM SERPL-SCNC: 4 MMOL/L (ref 3.5–5.3)
PR INTERVAL: 122 MS
QRS AXIS: 24 DEGREES
QRSD INTERVAL: 94 MS
QT INTERVAL: 324 MS
QTC INTERVAL: 415 MS
RBC # BLD AUTO: 3.89 MILLION/UL (ref 4.7–6.1)
S PNEUM AG UR QL: NEGATIVE
SODIUM SERPL-SCNC: 135 MMOL/L (ref 136–145)
T WAVE AXIS: -22 DEGREES
TROPONIN I SERPL-MCNC: <0.02 NG/ML
VENTRICULAR RATE: 99 BPM
WBC # BLD AUTO: 4.1 THOUSAND/UL (ref 4.8–10.8)

## 2018-03-30 PROCEDURE — 94640 AIRWAY INHALATION TREATMENT: CPT

## 2018-03-30 PROCEDURE — 87449 NOS EACH ORGANISM AG IA: CPT | Performed by: STUDENT IN AN ORGANIZED HEALTH CARE EDUCATION/TRAINING PROGRAM

## 2018-03-30 PROCEDURE — 85027 COMPLETE CBC AUTOMATED: CPT | Performed by: STUDENT IN AN ORGANIZED HEALTH CARE EDUCATION/TRAINING PROGRAM

## 2018-03-30 PROCEDURE — 83735 ASSAY OF MAGNESIUM: CPT | Performed by: STUDENT IN AN ORGANIZED HEALTH CARE EDUCATION/TRAINING PROGRAM

## 2018-03-30 PROCEDURE — 93010 ELECTROCARDIOGRAM REPORT: CPT | Performed by: INTERNAL MEDICINE

## 2018-03-30 PROCEDURE — 99222 1ST HOSP IP/OBS MODERATE 55: CPT | Performed by: INTERNAL MEDICINE

## 2018-03-30 PROCEDURE — 71275 CT ANGIOGRAPHY CHEST: CPT

## 2018-03-30 PROCEDURE — 94760 N-INVAS EAR/PLS OXIMETRY 1: CPT

## 2018-03-30 PROCEDURE — 94664 DEMO&/EVAL PT USE INHALER: CPT

## 2018-03-30 PROCEDURE — 84484 ASSAY OF TROPONIN QUANT: CPT | Performed by: STUDENT IN AN ORGANIZED HEALTH CARE EDUCATION/TRAINING PROGRAM

## 2018-03-30 PROCEDURE — 94660 CPAP INITIATION&MGMT: CPT

## 2018-03-30 PROCEDURE — 84100 ASSAY OF PHOSPHORUS: CPT | Performed by: STUDENT IN AN ORGANIZED HEALTH CARE EDUCATION/TRAINING PROGRAM

## 2018-03-30 PROCEDURE — 80048 BASIC METABOLIC PNL TOTAL CA: CPT | Performed by: STUDENT IN AN ORGANIZED HEALTH CARE EDUCATION/TRAINING PROGRAM

## 2018-03-30 RX ORDER — IPRATROPIUM BROMIDE AND ALBUTEROL SULFATE 2.5; .5 MG/3ML; MG/3ML
3 SOLUTION RESPIRATORY (INHALATION) EVERY 2 HOUR PRN
Status: DISCONTINUED | OUTPATIENT
Start: 2018-03-30 | End: 2018-04-04 | Stop reason: HOSPADM

## 2018-03-30 RX ORDER — LEVOFLOXACIN 5 MG/ML
750 INJECTION, SOLUTION INTRAVENOUS EVERY 24 HOURS
Status: DISCONTINUED | OUTPATIENT
Start: 2018-03-30 | End: 2018-04-02

## 2018-03-30 RX ORDER — FLUTICASONE PROPIONATE 50 MCG
1 SPRAY, SUSPENSION (ML) NASAL DAILY
Status: DISCONTINUED | OUTPATIENT
Start: 2018-03-30 | End: 2018-04-04 | Stop reason: HOSPADM

## 2018-03-30 RX ORDER — AMLODIPINE BESYLATE 10 MG/1
10 TABLET ORAL DAILY
Status: DISCONTINUED | OUTPATIENT
Start: 2018-03-30 | End: 2018-04-04 | Stop reason: HOSPADM

## 2018-03-30 RX ORDER — ACETAMINOPHEN 325 MG/1
650 TABLET ORAL EVERY 6 HOURS PRN
Status: DISCONTINUED | OUTPATIENT
Start: 2018-03-30 | End: 2018-04-04 | Stop reason: HOSPADM

## 2018-03-30 RX ORDER — PANTOPRAZOLE SODIUM 40 MG/1
40 TABLET, DELAYED RELEASE ORAL
Status: DISCONTINUED | OUTPATIENT
Start: 2018-03-30 | End: 2018-04-04 | Stop reason: HOSPADM

## 2018-03-30 RX ORDER — SODIUM CHLORIDE 9 MG/ML
75 INJECTION, SOLUTION INTRAVENOUS CONTINUOUS
Status: DISCONTINUED | OUTPATIENT
Start: 2018-03-30 | End: 2018-03-31

## 2018-03-30 RX ORDER — TAMSULOSIN HYDROCHLORIDE 0.4 MG/1
0.4 CAPSULE ORAL
Status: DISCONTINUED | OUTPATIENT
Start: 2018-03-30 | End: 2018-04-04 | Stop reason: HOSPADM

## 2018-03-30 RX ORDER — ONDANSETRON 2 MG/ML
4 INJECTION INTRAMUSCULAR; INTRAVENOUS EVERY 6 HOURS PRN
Status: DISCONTINUED | OUTPATIENT
Start: 2018-03-30 | End: 2018-04-04 | Stop reason: HOSPADM

## 2018-03-30 RX ORDER — MECLIZINE HCL 12.5 MG/1
12.5 TABLET ORAL DAILY PRN
Status: DISCONTINUED | OUTPATIENT
Start: 2018-03-30 | End: 2018-04-04 | Stop reason: HOSPADM

## 2018-03-30 RX ORDER — METHYLPREDNISOLONE SODIUM SUCCINATE 40 MG/ML
20 INJECTION, POWDER, LYOPHILIZED, FOR SOLUTION INTRAMUSCULAR; INTRAVENOUS EVERY 8 HOURS
Status: DISCONTINUED | OUTPATIENT
Start: 2018-03-30 | End: 2018-04-04 | Stop reason: HOSPADM

## 2018-03-30 RX ORDER — ZOLPIDEM TARTRATE 5 MG/1
10 TABLET ORAL
Status: DISCONTINUED | OUTPATIENT
Start: 2018-03-30 | End: 2018-04-04 | Stop reason: HOSPADM

## 2018-03-30 RX ORDER — IPRATROPIUM BROMIDE AND ALBUTEROL SULFATE 2.5; .5 MG/3ML; MG/3ML
3 SOLUTION RESPIRATORY (INHALATION)
Status: DISCONTINUED | OUTPATIENT
Start: 2018-03-30 | End: 2018-03-31

## 2018-03-30 RX ORDER — METHYLPREDNISOLONE SODIUM SUCCINATE 40 MG/ML
40 INJECTION, POWDER, LYOPHILIZED, FOR SOLUTION INTRAMUSCULAR; INTRAVENOUS EVERY 8 HOURS
Status: DISCONTINUED | OUTPATIENT
Start: 2018-03-30 | End: 2018-03-30

## 2018-03-30 RX ADMIN — SODIUM CHLORIDE 75 ML/HR: 0.9 INJECTION, SOLUTION INTRAVENOUS at 02:04

## 2018-03-30 RX ADMIN — PANTOPRAZOLE SODIUM 40 MG: 40 TABLET, DELAYED RELEASE ORAL at 05:27

## 2018-03-30 RX ADMIN — TAMSULOSIN HYDROCHLORIDE 0.4 MG: 0.4 CAPSULE ORAL at 21:52

## 2018-03-30 RX ADMIN — LEVOFLOXACIN 750 MG: 5 INJECTION, SOLUTION INTRAVENOUS at 02:04

## 2018-03-30 RX ADMIN — APIXABAN 5 MG: 5 TABLET, FILM COATED ORAL at 17:24

## 2018-03-30 RX ADMIN — ZOLPIDEM TARTRATE 10 MG: 5 TABLET ORAL at 21:04

## 2018-03-30 RX ADMIN — TAMSULOSIN HYDROCHLORIDE 0.4 MG: 0.4 CAPSULE ORAL at 02:03

## 2018-03-30 RX ADMIN — METHYLPREDNISOLONE SODIUM SUCCINATE 20 MG: 40 INJECTION, POWDER, FOR SOLUTION INTRAMUSCULAR; INTRAVENOUS at 18:24

## 2018-03-30 RX ADMIN — IOHEXOL 85 ML: 350 INJECTION, SOLUTION INTRAVENOUS at 14:38

## 2018-03-30 RX ADMIN — IPRATROPIUM BROMIDE AND ALBUTEROL SULFATE 3 ML: .5; 3 SOLUTION RESPIRATORY (INHALATION) at 10:20

## 2018-03-30 RX ADMIN — FLUTICASONE PROPIONATE 1 SPRAY: 50 SPRAY, METERED NASAL at 10:10

## 2018-03-30 RX ADMIN — IPRATROPIUM BROMIDE AND ALBUTEROL SULFATE 3 ML: .5; 3 SOLUTION RESPIRATORY (INHALATION) at 11:29

## 2018-03-30 RX ADMIN — IPRATROPIUM BROMIDE AND ALBUTEROL SULFATE 3 ML: .5; 3 SOLUTION RESPIRATORY (INHALATION) at 07:31

## 2018-03-30 RX ADMIN — APIXABAN 5 MG: 5 TABLET, FILM COATED ORAL at 10:15

## 2018-03-30 RX ADMIN — IPRATROPIUM BROMIDE AND ALBUTEROL SULFATE 3 ML: .5; 3 SOLUTION RESPIRATORY (INHALATION) at 23:47

## 2018-03-30 RX ADMIN — METHYLPREDNISOLONE SODIUM SUCCINATE 20 MG: 40 INJECTION, POWDER, FOR SOLUTION INTRAMUSCULAR; INTRAVENOUS at 10:09

## 2018-03-30 RX ADMIN — AMLODIPINE BESYLATE 10 MG: 10 TABLET ORAL at 10:09

## 2018-03-30 RX ADMIN — IPRATROPIUM BROMIDE AND ALBUTEROL SULFATE 3 ML: .5; 3 SOLUTION RESPIRATORY (INHALATION) at 20:16

## 2018-03-30 RX ADMIN — SODIUM CHLORIDE 75 ML/HR: 0.9 INJECTION, SOLUTION INTRAVENOUS at 17:24

## 2018-03-30 RX ADMIN — METHYLPREDNISOLONE SODIUM SUCCINATE 40 MG: 40 INJECTION, POWDER, FOR SOLUTION INTRAMUSCULAR; INTRAVENOUS at 02:03

## 2018-03-30 RX ADMIN — IPRATROPIUM BROMIDE AND ALBUTEROL SULFATE 3 ML: .5; 3 SOLUTION RESPIRATORY (INHALATION) at 16:02

## 2018-03-30 RX ADMIN — IPRATROPIUM BROMIDE AND ALBUTEROL SULFATE 3 ML: .5; 3 SOLUTION RESPIRATORY (INHALATION) at 03:38

## 2018-03-30 RX ADMIN — APIXABAN 5 MG: 5 TABLET, FILM COATED ORAL at 02:03

## 2018-03-30 NOTE — PLAN OF CARE
Problem: Knowledge Deficit  Goal: Patient/family/caregiver demonstrates understanding of disease process, treatment plan, medications, and discharge instructions  Complete learning assessment and assess knowledge base  Interventions:  - Provide teaching at level of understanding  - Provide teaching via preferred learning methods   -Pt is generally independent with no discharge needs at this time  Outcome: Progressing  Pt is generally independent  He lives alone in a single-level home  There is one step to enter the home  Pt has no HHC  His DME includes a walker, nebulizer and oxygen concentrator/portable tank  His DME is supplied by [employer]  Pt is on 3 liters of oxygen with exertion and 2 liters at rest  Pt's PCP is Dr Sharda Romano  He uses [facility] in [place]  Pt is retired and does still drive  Pt has no POA, or Living Will  Pt does not feel he has any discharge needs at this time  SW will continue following to assist with possible oxygen changes  Plan is for pt to discharge home with resumption of [facility]'s services

## 2018-03-30 NOTE — SOCIAL WORK
SW referral received to assess needs and assist with DCP  SW met with and son at the bedside to obtain baseline information and discuss discharge plans  OB status discussed and signature obtained  Pt is generally independent  He lives alone in a single-level home  There is one step to enter the home  Pt has no HHC  His DME includes a walker, nebulizer and oxygen concentrator/portable tank  His DME is supplied by [employer]  Pt is on 3 liters of oxygen with exertion and 2 liters at rest  Pt's PCP is Dr Davy Harris  He uses [facility] in [place]  Pt is retired and does still drive  Pt has no POA, or Living Will  Pt does not feel he has any discharge needs at this time  SW will continue following to assist with possible oxygen needs  Plan is for pt to discharge home with resumption of [facility]'s services  SW reviewed d/c planning process including the following: identifying help at home, pt preference for d/c planning needs, availability of treatment team to discuss questions or concerns pt and/or family may have regarding understanding medications and recognizing signs and symptoms once discharged  SW also encouraged pt to follow up with all recommended appointments after discharge  Pt advised of importance for pt and family to participate in managing pts medical well being

## 2018-03-30 NOTE — PROGRESS NOTES
[facility] Practice Progress Note - Lula Frank 75 y o  male MRN: [id number]    Unit/Bed#: [address] [place] [address] Encounter: [id number]      Assessment/Plan:    Acute on chronic SOB is likely 2/2 COPD exacerbation vs pneumonia vs bronchitis:  On home 3 L Nc, WBC 4 1  Monitor vitals, keep pulse oximetry greater than 92%   Solu-Medrol 40 mg q 8h, may decrease to 20 mg q 8h due to absence of wheezing  Continue Levaquin 750 mg daily  Duo nebs every 4 hours scheduled & every 2 hours p r n  Continue telemetry  Urine Legionella and strep pneumonia pending  Procalcitonin ordered  Pulmonology consulted    H/O Recent PE :  2/14/18 acute PE in distal left main pulmonary artery  Continue home Eliquis  Pulmonology consult    Neoplasm of unknown certainty of lung:  PET-CT 2/12/18 right upper lung nodule noted  Pulmonology consulted, follows with Dr Bossman Hanna who will see him today    Alpha 1 antitrypsin deficiency:   Continue weekly Zemaira every Tuesday per outpt regimen  Pulmonology consulted will evaluate today    CL[place]TON:  POA Cr 1 44, today 1 21, BUN 20 resolved  IV NS gentle hydration 75 cc/hr  Daily BMPs    HTN:  135/64, stable, continue home amlodipine 10 mg daily    GERD:  Continue Protonix 40 mg daily    BPH:  Continue home Flomax 0 4mg daily    Insomnia:  Continue home zolpidem 10 mg q h s  H/O vertigo:  Continue home meclizine 12 5 mg p r n  FEN & DVT PPX:  Cardiac diet, gentle hydration IV NS 75 cc/hour, replete electrolytes as needed  Eliquis and SCDs    Subjective:   Pt seen and examined at bedside  Pt is pleasant this morning and reports no complaints  Pt on 3L nc per his normal O2 at home and does not feel increasingly short of breath  Patient did not require BiPAP over night  Patient was receiving respiratory therapy given breathing treatments  Patient denies chest pain, nausea, vomiting, diarrhea, or constipation      Objective:     Vitals: Blood pressure 135/64, pulse 65, temperature 97 8 °F (36 6 °C), temperature source Tympanic, resp  rate 20, height 6' 5" (1 956 m), weight 81 2 kg (179 lb), SpO2 98 %  ,Body mass index is 21 23 kg/m²    Wt Readings from Last 3 Encounters:   03/29/18 81 2 kg (179 lb)   03/29/18 80 7 kg (178 lb)   03/23/18 80 3 kg (177 lb)     No intake or output data in the 24 hours ending 03/30/18 0909    Physical Exam: General appearance: alert and oriented, in no acute distress  Head: Normocephalic, without obvious abnormality, atraumatic  Lungs: diminished breath sounds and no audible wheezing or crackles  Heart: regular rate and rhythm, S1, S2 normal, no murmur, click, rub or gallop  Abdomen: soft, non-tender; bowel sounds normal; no masses,  no organomegaly  Extremities: extremities normal, warm and well-perfused; no cyanosis, clubbing, or edema     Recent Results (from the past 24 hour(s))   Basic metabolic panel    Collection Time: 03/29/18  7:21 PM   Result Value Ref Range    Sodium 139 136 - 145 mmol/L    Potassium 3 5 3 5 - 5 3 mmol/L    Chloride 103 100 - 108 mmol/L    CO2 25 21 - 32 mmol/L    Anion Gap 11 4 - 13 mmol/L    BUN 19 5 - 25 mg/dL    Creatinine 1 44 (H) 0 60 - 1 30 mg/dL    Glucose 133 65 - 140 mg/dL    Calcium 9 6 8 3 - 10 1 mg/dL    eGFR 47 ml/min/1 73sq m   NT-BNP PRO    Collection Time: 03/29/18  7:21 PM   Result Value Ref Range    NT-proBNP 141 (H) <125 pg/mL   CBC and differential    Collection Time: 03/29/18  7:21 PM   Result Value Ref Range    WBC 7 20 4 80 - 10 80 Thousand/uL    RBC 4 34 (L) 4 70 - 6 10 Million/uL    Hemoglobin 14 1 14 0 - 18 0 g/dL    Hematocrit 41 8 (L) 42 0 - 52 0 %    MCV 96 82 - 98 fL    MCH 32 6 (H) 27 0 - 31 0 pg    MCHC 33 8 31 4 - 37 4 g/dL    RDW 14 1 11 6 - 15 1 %    MPV 6 1 (L) 8 9 - 12 7 fL    Platelets 849 829 - 822 Thousands/uL    nRBC 0 /100 WBCs    Neutrophils Relative 72 43 - 75 %    Lymphocytes Relative 16 14 - 44 %    Monocytes Relative 9 4 - 12 %    Eosinophils Relative 2 0 - 6 %    Basophils Relative 0 0 - 1 %    Neutrophils Absolute 5 20 1 85 - 7 62 Thousands/µL    Lymphocytes Absolute 1 20 0 60 - 4 47 Thousands/µL    Monocytes Absolute 0 70 0 17 - 1 22 Thousand/µL    Eosinophils Absolute 0 10 0 00 - 0 61 Thousand/µL    Basophils Absolute 0 00 0 00 - 0 10 Thousands/µL   Protime-INR    Collection Time: 03/29/18  7:21 PM   Result Value Ref Range    Protime 11 4 9 4 - 11 7 seconds    INR 1 08 0 86 - 1 16   APTT    Collection Time: 03/29/18  7:21 PM   Result Value Ref Range    PTT 26 24 - 33 seconds   Troponin I    Collection Time: 03/29/18  7:21 PM   Result Value Ref Range    Troponin I <0 02 <=0 04 ng/mL   Blood gas, arterial    Collection Time: 03/29/18  8:33 PM   Result Value Ref Range    pH, Arterial 7 429 7 350 - 7 450    PH ART TC 7 425 7 350 - 7 450    pCO2, Arterial 35 4 (L) 36 0 - 44 0 mm Hg    PCO2 (TC) Arterial 35 9 (L) 36 0 - 44 0 mm Hg    pO2, Arterial 196 7 (H) 75 0 - 129 0 mm Hg    PO2 (TC) Arterial 198 3 (H) 75 0 - 129 0 mm Hg    HCO3, Arterial 22 9 22 0 - 28 0 mmol/L    Base Excess, Arterial -0 9 mmol/L    O2 Content, Arterial 20 0 16 0 - 23 0 mL/dL    O2 HGB,Arterial  98 4 (H) 94 0 - 97 0 %    SOURCE Radial, Left     FLORENCE TEST Yes     Temperature 99 2 Degrees Fehrenheit    Non Vent type BIPAP BIPAP     IPAP 12     EPAP 6     BIPAP fio2 50 %   Troponin I    Collection Time: 03/30/18 12:42 AM   Result Value Ref Range    Troponin I <0 02 <=0 04 ng/mL   Basic metabolic panel    Collection Time: 03/30/18  5:09 AM   Result Value Ref Range    Sodium 135 (L) 136 - 145 mmol/L    Potassium 4 0 3 5 - 5 3 mmol/L    Chloride 102 100 - 108 mmol/L    CO2 22 21 - 32 mmol/L    Anion Gap 11 4 - 13 mmol/L    BUN 20 5 - 25 mg/dL    Creatinine 1 21 0 60 - 1 30 mg/dL    Glucose 165 (H) 65 - 140 mg/dL    Calcium 8 8 8 3 - 10 1 mg/dL    eGFR 59 ml/min/1 73sq m   Magnesium    Collection Time: 03/30/18  5:09 AM   Result Value Ref Range    Magnesium 1 8 1 6 - 2 6 mg/dL   Phosphorus    Collection Time: 03/30/18  5:09 AM   Result Value Ref Range Phosphorus 3 0 2 3 - 4 1 mg/dL   CBC (With Platelets)    Collection Time: 03/30/18  5:09 AM   Result Value Ref Range    WBC 4 10 (L) 4 80 - 10 80 Thousand/uL    RBC 3 89 (L) 4 70 - 6 10 Million/uL    Hemoglobin 12 6 (L) 14 0 - 18 0 g/dL    Hematocrit 37 5 (L) 42 0 - 52 0 %    MCV 96 82 - 98 fL    MCH 32 4 (H) 27 0 - 31 0 pg    MCHC 33 7 31 4 - 37 4 g/dL    RDW 14 1 11 6 - 15 1 %    Platelets 950 565 - 844 Thousands/uL    MPV 6 5 (L) 8 9 - 12 7 fL       Current Facility-Administered Medications   Medication Dose Route Frequency Provider Last Rate Last Dose    acetaminophen (TYLENOL) tablet 650 mg  650 mg Oral Q6H PRN Nell Sahu, DO        amLODIPine (NORVASC) tablet 10 mg  10 mg Oral Daily Nell Sahu, DO        apixaban (ELIQUIS) tablet 5 mg  5 mg Oral BID Nell Sahu, DO   5 mg at 03/30/18 0203    fluticasone (FLONASE) 50 mcg/act nasal spray 1 spray  1 spray Each Nare Daily Nell Sahu DO        ipratropium-albuterol (DUO-NEB) 0 5-2 5 mg/3 mL inhalation solution 3 mL  3 mL Nebulization Q4H Nell Sahu, DO   3 mL at 03/30/18 0731    ipratropium-albuterol (DUO-NEB) 0 5-2 5 mg/3 mL inhalation solution 3 mL  3 mL Nebulization Q2H PRN Nell Sahu, DO        levofloxacin (LEVAQUIN) IVPB (premix) 750 mg  750 mg Intravenous Q24H Nell Sahu,  mL/hr at 03/30/18 0204 750 mg at 03/30/18 0204    meclizine (ANTIVERT) tablet 12 5 mg  12 5 mg Oral Daily PRN Nell Sahu, DO        methylPREDNISolone sodium succinate (Solu-MEDROL) injection 40 mg  40 mg Intravenous Q8H Nell Sahu, DO   40 mg at 03/30/18 0203    ondansetron (ZOFRAN) injection 4 mg  4 mg Intravenous Q6H PRN Nell Sahu,         pantoprazole (PROTONIX) EC tablet 40 mg  40 mg Oral Early Morning Nell Sahu, DO   40 mg at 03/30/18 0527    sodium chloride 0 9 % infusion  75 mL/hr Intravenous Continuous Nell Sahu DO 75 mL/hr at 03/30/18 0204 75 mL/hr at 03/30/18 0204    tamsulosin (FLOMAX) capsule 0 4 mg  0 4 mg Oral HS Nelljoe Sahu, DO   0 4 mg at 03/30/18 0203    zolpidem (AMBIEN) tablet 10 mg  10 mg Oral HS PRN Cyn Murphy, DO           Invasive Devices     Peripheral Intravenous Line            Peripheral IV 03/29/18 Left Forearm 1 day                Lab, Imaging and other studies: I have personally reviewed pertinent reports  VTE Pharmacologic Prophylaxis: Eliquis  VTE Mechanical Prophylaxis: sequential compression device    Cosimo Stands, DO     SENIOR RESIDENT NOTE: I have personally seen and examined the patient, and agree with the resident's assessment  Patient lung exam diminished but no wheezing on exam   He is stating his shortness of breath is better since admission  Will follow up with pulmonology for further recommendations       Yue Jorgensen MD , MD

## 2018-03-30 NOTE — PHYSICIAN ADVISOR
Current patient class: Observation  The patient is currently on Hospital Day: 2    This patient was originally admitted to the hospital under observation status  After admission the patient was reevaluated and determined to require further hospitalization  The patient is now documented to require at least a 2nd midnight in the hospital  As such the patient is now expected to satisfy the 2 midnight benchmark and is therefore eligible for inpatient admission  After review of the relevant documentation, labs, vital signs and test results, the patient is appropriate for INPATIENT ADMISSION  Admission to the hospital as an inpatient is a complex decision making process which requires the practitioner to consider the patients presenting complaint, history and physical examination and all relevant testing  With this in mind, in this case, the patient was deemed appropriate for INPATIENT ADMISSION  After review of the documentation and testing available at the time of the admission I concur with this clinical determination of medical necessity  Rationale is as follows: The patient is a 43-year-old male who presented to the hospital on March 29, 2018 because of worsening shortness of breath  He was initially placed under observation status  His symptoms were attributed to exacerbation of COPD as well as the possibility of bronchitis versus pneumonia  He was transitioned from BiPAP to 3 L nasal cannula  The patient has multiple comorbidities  He will now remain in the hospital beyond two midnights  He will remain on intravenous steroids although at a lower dose  Pulmonology has been consulted  He is noted to also have alpha 1 antitrypsin deficiency  The patient will be changed to inpatient level of care which is appropriate given his presenting symptoms and planned treatment      The patients vitals on arrival were ED Triage Vitals [03/29/18 1914]   Temperature Pulse Respirations Blood Pressure SpO2 99 2 °F (37 3 °C) 102 (!) 32 (!) 178/78 (!) 89 %      Temp Source Heart Rate Source Patient Position - Orthostatic VS BP Location FiO2 (%)   Tympanic Monitor Sitting Right arm --      Pain Score       No Pain           Past Medical History:   Diagnosis Date    Anesthesia complication     Difficult to wake up    Arthritis     BPH (benign prostatic hyperplasia)     urinary frequency    Cancer (HCC)     basal cell neck, face    COPD (chronic obstructive pulmonary disease) (HCC)     Full dentures     Hypertension     controlled    Kidney stone     at least 7 episodes    Liver disease     Alpha 1- enzyme deficiency - diagnosed 2002  has been on weekly replacement therapy since then    Pulmonary emphysema ([facility] [address] )     1/25/15  FEV1 - 2 45 liters or 59% of predicted    Wears glasses     for driving only     Past Surgical History:   Procedure Laterality Date    BACK SURGERY  2008    discectomy    COLONOSCOPY      COLONOSCOPY N/A 3/10/2017    Procedure: Maria Isabel Resendiz;  Surgeon: Giuseppe Elizabeth MD;  Location: [facility]; Service:    Nonda Black      removal of kidney stones    ESOPHAGOGASTRODUODENOSCOPY N/A 3/10/2017    Procedure: ESOPHAGOGASTRODUODENOSCOPY (EGD); Surgeon: Giuseppe Elizabeth MD;  Location: [facility] GI LAB;   Service:     LITHOTRIPSY      TONSILLECTOMY      VEIN LIGATION AND STRIPPING Bilateral     1980's       The patient was admitted to the hospital at N/A on N/A for the following diagnosis:  Shortness of breath [R06 02]  SOB (shortness of breath) [R06 02]  COPD with acute exacerbation ([facility] [address] ) [J44 1]    Consults have been placed to:   IP CONSULT TO PULMONOLOGY    Vitals:    03/30/18 0732 03/30/18 1008 03/30/18 1021 03/30/18 1131   BP:  164/79     BP Location:  Right arm     Pulse:  64     Resp:  20     Temp:  98 6 °F (37 °C)     TempSrc:  Oral     SpO2: 98% 98% 96% 97%   Weight:       Height:           Most recent labs:    Recent Labs      03/29/18   1921  03/30/18   0042  03/30/18   7806 WBC  7 20   --   4 10*   HGB  14 1   --   12 6*   HCT  41 8*   --   37 5*   PLT  155   --   138   K  3 5   --   4 0   NA  139   --   135*   CALCIUM  9 6   --   8 8   BUN  19   --   20   CREATININE  1 44*   --   1 21   INR  1 08   --    --    TROPONINI  <0 02  <0 02   --        Scheduled Meds:  Current Facility-Administered Medications:  acetaminophen 650 mg Oral Q6H PRN Nell Sahu, DO    amLODIPine 10 mg Oral Daily Nell Sahu, DO    apixaban 5 mg Oral BID Nell Sahu, DO    fluticasone 1 spray Each Nare Daily Nell Sahu, DO    ipratropium-albuterol 3 mL Nebulization Q4H Nell Sahu, DO    ipratropium-albuterol 3 mL Nebulization Q2H PRN Nell Sahu, DO    levofloxacin 750 mg Intravenous Q24H Nell Sahu, DO Last Rate: 750 mg (03/30/18 0204)   meclizine 12 5 mg Oral Daily PRN Nell Sahu, DO    methylPREDNISolone sodium succinate 20 mg Intravenous Q8H Orville Tuttle MD    ondansetron 4 mg Intravenous Q6H PRN Nell Sahu, DO    pantoprazole 40 mg Oral Early Morning Nell Sahu, DO    sodium chloride 75 mL/hr Intravenous Continuous Nell Sahu,  Last Rate: 75 mL/hr (03/30/18 0204)   tamsulosin 0 4 mg Oral HS Nell Jamison, DO    zolpidem 10 mg Oral HS PRN Nell Sahu, DO      Continuous Infusions:  sodium chloride 75 mL/hr Last Rate: 75 mL/hr (03/30/18 0204)     PRN Meds:   acetaminophen    ipratropium-albuterol    meclizine    ondansetron    zolpidem    Surgical procedures (if appropriate):

## 2018-03-30 NOTE — PLAN OF CARE

## 2018-03-30 NOTE — ED NOTES
Pt taken off the BiPAP for a trial   Pt placed on Nasal Canula @ 3 lpm      William Jorgensen RN  03/29/18 6788

## 2018-03-30 NOTE — CONSULTS
Consultation - Pulmonary Medicine   Rolly Chapman 75 y o  male MRN: [id number]  Unit/Bed#: [address] [place] [address] Encounter: [id number]      Assessment:  Worsening dyspnea possibly due to worsening of his lung function from recent acute exacerbation of COPD from RSV infection earlier this year  Patient is status post recent pulmonary emboli to the left lung and February 2018  CTA of the chest shows that these pulmonary I have almost completely resolved so this is not the reason for his dyspnea  He recent completed course of prednisone therapy with no improvement in his breathing  He has had some cough possibly has acute bronchitis  Spirometry done yesterday showed worsening of his airflow obstruction with FEV1 of 1 6 L or 40% of predicted when 2 weeks prior to that is FEV1 was 1 95 L or 48% of predicted and last year at was in the 50% range  Alpha 1 antitrypsin deficiency - patient does receive IV replacement therapy weekly on Tuesday  Initially diagnosed 2002  Tiny lung nodules  These possibly could be inflammatory but could not exclude early neoplasm in the right upper lobe which is the largest nodule  These nodules were not present on lung cancer screening CT of the chest done January 2017  There has been no significant change in these nodules compared to more recent CT scans    Plan:  I spoke with his medical team   Concur with empiric treatment with Levaquin and methylprednisone 20 mg IV q 8 hours for possible acute exacerbation of COPD as there has been decline in his FEV1 recently  CT of chest done today was reviewed and, sore above  Oxygen 3 liters/minute  Patient does have home oxygen  Continue Eliquis  Neb treatments with DuoNeb every 6 hours  Check sputum culture  Check echocardiogram to assess for any possibility of worsening pulmonary hypertension    Last echo showed only very mild PA hypertension look for any evidence of any developing cardiomyopathy    History of Present Illness   Physician Requesting Consult: Natalie Gasca MD  Reason for Consult / Principal Problem:  Dyspnea, severe COPD  Hx and PE limited by: none    HPI: Yves Ramirez is a 75y o  year old male who presents with complaints of worsening shortness of breath over the past few days  He had been on a tapering dose of prednisone and recent completed not feel that helped him much  Does have some intermittent cough that is mostly nonproductive  No chest pain or wheezing  No hemoptysis  I had seen him and my office yesterday and at that time his lung sounds were clear but very diminished  Spirometry done yesterday showed evidence of severe airflow obstruction  There is some mild restrictive impairment probably due to air trapping from COPD  His FEV1 was decreased from previous testing or March 12th when FEV1 was 1 95 L or 48% of predicted  Forced vital capacity then was 4 48 L 81% of predicted with obstructive index of 43%  Spirometry testing done yesterday 3/29;  FVC - 3 73 L or 68% predicted  FEV1 - 1 60 L    40% predicted  FEV1/FVC% - 43%      Patient does have history of alpha 1 antitrypsin deficiency diagnosed 2002 and his phenotype is PiSZ  He has been on alpha 1 antitrypsin replacement therapy weekly for several years  He quit smoking August 2013 but smoked 1 pack per day for 60 years  He did have RSV infection back in January 2018  Patient also had acute pulmonary emboli involving the distal left mainstem pulmonary index center branch left lower lobe pulmonary artery back in February of 2018  Also he was noted to have bilateral small lung nodules with a 1 2 x 0 8 cm right upper lobe lung nodule  Recent PET scan showed this to have mild hypermetabolic activity with SUV of 3 4  Also had another 1 3 cm by 0 6 cm left lower lobe lung nodule without any hyper metabolism  Because of his problems breathing and recent pulmonary embolism with a biopsy of the right upper lobe has been deferred    The PET-CT scan was done February 12, 2018  Patient has been on Eliquis for his pulmonary embolism  CTA of the chest done today shows near complete resolution of the left pulmonary emboli     There is still persistent irregular nodule and right upper lobe that measures about 9 mm and also persistent small left lower lobe basilar nodule measuring 8 x 5 mm  There are changes consistent with emphysema  The patient did have a CT lung screening CT scan done January 18, 2017 and no lung nodules were visible at that time  Echocardiogram done July 31, 2017 showed estimated pulmonary pressure of 40 mm Hg and LV systolic function was normal at 55% ejection fraction  ABG done yesterday on BiPAP 12/650% showed pH is 7 34, pCO2 36, PO2 197 mm Hg    CBC shows white blood cell count 7 2 and hemoglobin 14 1 with platelet count 856    The patient had been participating in pulmonary rehab but over last week or so his breathing got worse so he was not able to do much    Review of Systems   Constitutional: Negative for chills and fever  Complains of shortness of Breath any minimal activity which has gotten worse over the past few days  HENT: Negative for congestion, rhinorrhea and sore throat  Eyes: Negative for redness and itching  Respiratory: Negative for wheezing  Has some intermittent cough mostly nonproductive   Cardiovascular: Negative for chest pain  Gastrointestinal: Negative for abdominal pain, nausea and vomiting  Endocrine: Negative for polydipsia and polyphagia  Genitourinary: Negative for dysuria  Musculoskeletal: Negative for myalgias  Skin: Negative for rash  Neurological: Negative for dizziness  Psychiatric/Behavioral: Negative for confusion         Historical Information   Past Medical History:   Diagnosis Date    Anesthesia complication     Difficult to wake up    Arthritis     BPH (benign prostatic hyperplasia)     urinary frequency    Cancer (HCC)     basal cell neck, face    COPD [facility] disease) [facility] [address] [facility]     Full dentures     Hypertension     controlled    Kidney stone     at least 7 episodes    Liver disease     Alpha 1- enzyme deficiency - diagnosed 2002  has been on weekly replacement therapy since then    Pulmonary emphysema ([facility] [address] )     1/25/15  FEV1 - 2 45 liters or 59% of predicted    Wears glasses     for driving only     Past Surgical History:   Procedure Laterality Date    BACK SURGERY  2008    discectomy    COLONOSCOPY      COLONOSCOPY N/A 3/10/2017    Procedure: Maria Isabel Resendiz;  Surgeon: Giuseppe Elizabeth MD;  Location: [place] [address] GI LAB; Service:    Nonda Black      removal of kidney stones    ESOPHAGOGASTRODUODENOSCOPY N/A 3/10/2017    Procedure: ESOPHAGOGASTRODUODENOSCOPY (EGD); Surgeon: Giuseppe Elizabeth MD;  Location: [facility] GI LAB;   Service:     LITHOTRIPSY      TONSILLECTOMY      VEIN LIGATION AND STRIPPING Bilateral     1980's     Social History   History   Alcohol Use No     History   Drug Use No     History   Smoking Status    Former Smoker    Packs/day: 1 00    Years: 60 00    Quit date: 8/31/2017   Smokeless Tobacco    Never Used     Comment: quit in august 2017     Family History:   Family History   Problem Relation Age of Onset    Emphysema Mother      never smoked    Emphysema Father     Cancer Brother      colon    Colon cancer Brother     Ulcerative colitis Family     Liver disease Family        Meds/Allergies     Current Facility-Administered Medications:     acetaminophen (TYLENOL) tablet 650 mg, 650 mg, Oral, Q6H PRN, Nell Sahu DO    amLODIPine (NORVASC) tablet 10 mg, 10 mg, Oral, Daily, Nell Sahu DO, 10 mg at 03/30/18 1009    apixaban (ELIQUIS) tablet 5 mg, 5 mg, Oral, BID, Nell Sahu DO, 5 mg at 03/30/18 1724    fluticasone (FLONASE) 50 mcg/act nasal spray 1 spray, 1 spray, Each Nare, Daily, Nell Sahu DO, 1 spray at 03/30/18 1010    ipratropium-albuterol (DUO-NEB) 0 5-2 5 mg/3 mL inhalation solution 3 mL, 3 mL, Nebulization, Q4H, Nell Sahu, DO, 3 mL at 03/30/18 1602    ipratropium-albuterol (DUO-NEB) 0 5-2 5 mg/3 mL inhalation solution 3 mL, 3 mL, Nebulization, Q2H PRN, Nell Sahu, DO, 3 mL at 03/30/18 1020    levofloxacin (LEVAQUIN) IVPB (premix) 750 mg, 750 mg, Intravenous, Q24H, Nell Sahu, DO, Last Rate: 100 mL/hr at 03/30/18 0204, 750 mg at 03/30/18 0204    meclizine (ANTIVERT) tablet 12 5 mg, 12 5 mg, Oral, Daily PRN, Derrel Marylou, DO    methylPREDNISolone sodium succinate (Solu-MEDROL) injection 20 mg, 20 mg, Intravenous, Q8H, Mckayla Zepeda MD, 20 mg at 03/30/18 1009    ondansetron (ZOFRAN) injection 4 mg, 4 mg, Intravenous, Q6H PRN, Nell Sahu DO    pantoprazole (PROTONIX) EC tablet 40 mg, 40 mg, Oral, Early Morning, Nell Sahu DO, 40 mg at 03/30/18 0527    sodium chloride 0 9 % infusion, 75 mL/hr, Intravenous, Continuous, Nell Sahu, , Last Rate: 75 mL/hr at 03/30/18 1724, 75 mL/hr at 03/30/18 1724    tamsulosin (FLOMAX) capsule 0 4 mg, 0 4 mg, Oral, HS, Nell Sahu, DO, 0 4 mg at 03/30/18 0203    zolpidem (AMBIEN) tablet 10 mg, 10 mg, Oral, HS PRN, Derrel Marylou, DO    Prior to Admission medications    Medication Sig Start Date End Date Taking?  Authorizing Provider   albuterol (2 5 mg/3 mL) 0 083 % nebulizer solution Inhale 1 each every 4 (four) hours as needed 4/25/17  Yes Historical Provider, MD   albuterol (PROVENTIL HFA,VENTOLIN HFA) 90 mcg/act inhaler Inhale 2 puffs every 6 (six) hours as needed for wheezing 1/31/18  Yes Rose Hermosillo MD   Alpha1-Proteinase Inhibitor (ZEMAIRA IV) Infuse into a venous catheter once a week On tuesday -1000mg /50 ml    Yes Historical Provider, MD   amLODIPine (NORVASC) 10 mg tablet Take 1 tablet (10 mg total) by mouth daily 3/23/18  Yes America Black NP   apixaban (ELIQUIS) 5 mg Take 1 tablet (5 mg total) by mouth 2 (two) times a day 2/18/18  Yes Jorge Florencio Veliz MD   fluticasone Milinda Skaggs) 50 mcg/act nasal spray  11/22/17  Yes Historical Provider, MD   fluticasone-salmeterol (ADVAIR DISKUS) 250-50 mcg/dose inhaler Inhale   Yes Historical Provider, MD   meclizine (ANTIVERT) 12 5 MG tablet Take 1 tablet by mouth daily 4/25/17  Yes Historical Provider, MD   omeprazole (PriLOSEC) 40 MG capsule  12/21/17  Yes Historical Provider, MD   tamsulosin (FLOMAX) 0 4 mg Take 0 4 mg by mouth daily at bedtime   Yes Historical Provider, MD   zolpidem (AMBIEN CR) 6 25 MG CR tablet Take 10 mg by mouth daily at bedtime as needed for sleep   Yes Historical Provider, MD   azithromycin (ZITHROMAX) 250 mg tablet Take 1 tablet (250 mg total) by mouth see administration instructions for 5 days Take 2 tablets 1st day then 1 tablet daily for 4 days 3/29/18 3/30/18 Yes Carina Farley DO   predniSONE 10 mg tablet 4 tabs daily x4 days, 3 tabs daily x 4 days, 2 tabs daily x 4 days 1 tab daily x 4 days  Extra tabs  3/12/18 3/30/18 Yes Geneva Sieving, CRNP   INCRUSE ELLIPTA 62 5 MCG/INH AEPB  3/5/18   Historical Provider, MD       Allergies   Allergen Reactions    Chantix [Varenicline]      Caused prostate infection       Objective   Vitals: Blood pressure 169/83, pulse 86, temperature 97 5 °F (36 4 °C), resp  rate 20, height 6' 5" (1 956 m), weight 81 2 kg (179 lb), SpO2 96 %  ,Body mass index is 21 23 kg/m²  Intake/Output Summary (Last 24 hours) at 03/30/18 1802  Last data filed at 03/30/18 1400   Gross per 24 hour   Intake              840 ml   Output                0 ml   Net              840 ml     Invasive Devices     Peripheral Intravenous Line            Peripheral IV 03/30/18 Left Antecubital less than 1 day                Physical Exam   Constitutional: He is oriented to person, place, and time  Patient is lying in bed at present time  Not in any distress  On 3 L of oxygen O2 saturation is 96%   HENT:   Head: Normocephalic     Nose: Nose normal    Mouth/Throat: Oropharynx is clear and moist  No oropharyngeal exudate  Eyes: Conjunctivae are normal  Pupils are equal, round, and reactive to light  No scleral icterus  Neck: Neck supple  No JVD present  Cardiovascular: Normal rate, regular rhythm and normal heart sounds  Pulmonary/Chest: Effort normal    Lung sounds are diminished but clear   Abdominal: Soft  He exhibits no distension  There is no tenderness  There is no guarding  Musculoskeletal: He exhibits no edema  Lymphadenopathy:     He has no cervical adenopathy  Neurological: He is alert and oriented to person, place, and time  Skin: Skin is warm and dry  Psychiatric: He has a normal mood and affect   His behavior is normal  Thought content normal        Lab Results: ABG: Lab Results   Component Value Date    PHART 7 429 03/29/2018    OUP0WKQ 35 4 (L) 03/29/2018    PO2ART 196 7 (H) 03/29/2018    IJN0RMF 22 9 03/29/2018    BEART -0 9 03/29/2018    SOURCE Radial, Left 03/29/2018   , BNP: No results found for: BNP, CBC: Lab Results   Component Value Date    WBC 4 10 (L) 03/30/2018    HGB 12 6 (L) 03/30/2018    HCT 37 5 (L) 03/30/2018    MCV 96 03/30/2018     03/30/2018    MCH 32 4 (H) 03/30/2018    MCHC 33 7 03/30/2018    RDW 14 1 03/30/2018    MPV 6 5 (L) 03/30/2018    NRBC 0 03/29/2018   , CMP: Lab Results   Component Value Date     (L) 03/30/2018     09/27/2016    K 4 0 03/30/2018    K 4 2 09/27/2016     03/30/2018     09/27/2016    CO2 22 03/30/2018    CO2 23 09/27/2016    ANIONGAP 11 03/30/2018    BUN 20 03/30/2018    BUN 14 09/27/2016    CREATININE 1 21 03/30/2018    CREATININE 1 09 09/27/2016    GLUCOSE 165 (H) 03/30/2018    GLUCOSE 89 09/27/2016    CALCIUM 8 8 03/30/2018    CALCIUM 9 3 09/27/2016    AST 15 02/15/2018    AST 18 09/27/2016    ALT 32 02/15/2018    ALT 16 09/27/2016    ALKPHOS 45 (L) 02/15/2018    ALKPHOS 57 09/27/2016    PROT 6 5 02/15/2018    PROT 6 9 09/27/2016    BILITOT 1 00 02/15/2018    BILITOT 1 0 09/27/2016    EGFR 59 03/30/2018   , PT/INR:   Lab Results   Component Value Date    INR 1 08 03/29/2018   , Troponin: No results found for: TROPONIN    Imaging Studies: I have personally reviewed pertinent reports  and I have personally reviewed pertinent films in PACS    EKG, Pathology, and Other Studies: I have personally reviewed pertinent reports  VTE Prophylaxis: Eliquis    Code Status: Level 1 - Full Code    Counseling/Coordination of Care: Total floor / unit time spent today 30 minutes  Greater than 50% of total time was spent with the patient and / or family counseling and / or coordination of care  A description of the counseling / coordination of care: I reviewed patient's chart, CT scan of chest, and discussed possible diagnosis and treatment with him    I also spoke with his medical team

## 2018-03-30 NOTE — CASE MANAGEMENT
Initial Clinical Review    Admission: Date/Time/Statement: 3/29/18 2143    Orders Placed This Encounter   Procedures    Place in Observation (expected length of stay for this patient is less than two midnights)     Standing Status:   Standing     Number of Occurrences:   1     Order Specific Question:   Admitting Physician     Answer:   González Talamantes     Order Specific Question:   Level of Care     Answer:   Med Surg [16]         ED: Date/Time/Mode of Arrival:   ED Arrival Information     Expected Arrival Acuity Means of Arrival Escorted By Service Admission Type    - 3/29/2018 19:10 Emergent Ambulance [facility] General Medicine Emergency    Arrival Complaint    Shortness of Breath          Chief Complaint:   Chief Complaint   Patient presents with    Shortness of Breath     Pt with ongoing problem of SOB since christmas, saw Dr Valentín Chaparro this morning and has gotten worse since then ,       History of Illness: 76 y/o male with PMH BPH, COPD, Alpha-1 antitrypsin deficiency, HTN, GERD, Insomnia, recent PE and detection of nodules in lungs presents with worsening of SOB for the past 2 days  Pt states that he has shortness of breath for several weeks now, but the past few days he found it extremely difficult to breath  He has noticed that the shortness of breath is worse with exertion and improves when he is laying down with one pillow propping him up  Pt is on continues use of 3L O2 at home  In the past few days pt did nebulizer treatments every 6 hours and used his inhalers but nothing was providing him any relief  Pt states he has been feeling more hot lately, but no fever or chills  For the past week he has been experiencing upper respiratory symptoms of runny nose, mild congestion and some ear pain  Additionally he has a worsening of a non-productive cough, which has worsening especially in the past few days  Pt states that he has a lot appetite and feeling more fatigued in the last few days   He has noticed a numbness and tingling down the right side of his hands and legs  PT was recently admitted to the hospital for PE after which he has been started on Eliquis  He was discharged on a Prednisone taper which he completed 2 days ago  Pt is following outpatient with Dr Quentin Edmonds and saw him in the office today  ED Vital Signs:   ED Triage Vitals [03/29/18 1914]   Temperature Pulse Respirations Blood Pressure SpO2   99 2 °F (37 3 °C) 102 (!) 32 (!) 178/78 (!) 89 %      Temp Source Heart Rate Source Patient Position - Orthostatic VS BP Location FiO2 (%)   Tympanic Monitor Sitting Right arm --      Pain Score       No Pain        Wt Readings from Last 1 Encounters:   03/29/18 81 2 kg (179 lb)       Vital Signs (abnormal): Abnormal Labs/Diagnostic Test Results:  GLUCOSE 165 WBC 4 10 H/H 12 6/37 5  CXR  Stable chronic changes within the lung fields  No pneumonia or congestive failure    BIPAP ABG PCO2 35 4 PO1 196 7  ED Treatment:   Medication Administration from 03/29/2018 1910 to 03/29/2018 2216       Date/Time Order Dose Route Action Action by Comments     03/29/2018 1919  EMS REPLENISHMENT MED 0  Does not apply Given to EMS Kalen Berkowitz RN      03/29/2018 1923 methylPREDNISolone sodium succinate (Solu-MEDROL) injection 125 mg 125 mg Intravenous Given Kishore Roberts RN      03/29/2018 1950 LORazepam (ATIVAN) 2 mg/mL injection 0 5 mg 0 5 mg Intravenous Given Kishore Roberts RN           Past Medical/Surgical History:    Active Ambulatory Problems     Diagnosis Date Noted    AAT (alpha-1-antitrypsin) deficiency ([facility] [address] [facility] 12/30/2017    Moderate COPD (chronic obstructive pulmonary disease) ([facility] [address] [facility] 12/30/2017    Causalgia of lower limb 12/30/2017    Unspecified essential hypertension 12/30/2017    Gastroesophageal reflux disease without esophagitis 01/27/2014    SOB (shortness of breath) 12/30/2017    HTN (hypertension) 12/30/2017    BPH (benign prostatic hyperplasia) 12/30/2017    Liver disease     Lung nodules 01/31/2018    Acute hypoxemic respiratory failure ([facility] [address] [facility] 01/31/2018    Former smoker 01/31/2018    Chest pain at rest 02/13/2018    Lung neoplasm 02/14/2018    Pulmonary embolism ([facility] ) 02/14/2018   [facility] discharge follow-up 02/23/2018    Elevated blood pressure reading 03/19/2018    Chronic hypoxemic respiratory failure ([facility] ) 03/29/2018     Resolved Ambulatory Problems     Diagnosis Date Noted    Gastroesophageal reflux disease 12/30/2017    COPD exacerbation ([facility] ) 12/30/2017    RSV infection 12/31/2017     Past Medical History:   Diagnosis Date    Anesthesia complication     Arthritis     BPH (benign prostatic hyperplasia)     Cancer (HCC)     COPD (chronic obstructive pulmonary disease) (HCC)     Full dentures     Hypertension     Kidney stone     Liver disease     Pulmonary emphysema ([facility] )     Wears glasses        Admitting Diagnosis: Shortness of breath [R06 02]  SOB (shortness of breath) [R06 02]  COPD with acute exacerbation (HCC) [J44 1]    Age/Sex: 75 y o  male    Assessment/Plan:   Assessment:     76 y/o male with PMH BPH, COPD, Alpha-1 antitrypsin deficiency, HTN, GERD, Insomnia, recent PE and detection of nodules in lungs is admitted for worsening of shortness of breath to the hospital for observation under the supervision of Dr Elis Cespedes and is expected to stay <2 midnights  Pt is currently full code  Pt is expected to be discharged to home  Acute on chronic Shortness of breath likely 2/2 COPD exaccerbation vs Bronchitis vs  Pneumonia vs anxiety   - Significant improvement after placement on BiPAP   - Vitals  99 2F  102HR  32 RR  178/78  89%O2 on RA on presentation   - WBC 7 20,  ,  Trop <0 02  - ABG (on BiPAP): pH 7 429   pCO2 35 9  pO2 198 3  HCO3 22 9  - Chest Xray: Stable chronic changes within the lung fields   No pneumonia or congestive failure  - In ED pt received Ativan 0 5mg IV and Solumedrol 125mg IV   Pt placed on Bipap and then switched to 3L NC   - Start Solumedrol 40mg Q8H    - Start Levaquin 750 Q24H   - Urine legionella and urine strep pneumo ordered   - Duonebs Q4H Mitesh/ Q2H PRN   - Continue Oxygen supplementation via NC 3L   - Telemetry monitoring   - Last ECHO 7/17:  81-14%, grade 1 diastolic dysfunction   - Consult to Pulmonology   Acute Kidney Injury   - Cr 1 44 present on admission   - IV NS 75 cc/h   - Baseline 1-1 15   - Will monitor daily AM BMP   Hx of recent PE   - CTA Chest 2/14: Acute pulmonary embolus in the distal left main pulmonary artery and proximal left lower lobe segmental branches  - CTA chest 3/12: Pulmonary embolism in the distal left main pulmonary artery and segmental branch to the left lower lobe   This appears smaller compared to the prior study but is not completely resolved  - Continue home medication Eliquis  - Consult to Pulmonology   Neoplasm of unknown certainty of lung  - PET CT 2/12: 1 2 x 0 8 cm right upper lung nodule demonstrates hypermetabolism and is most concerning for malignancy   Tissue sampling recommended  1 3 x 0 6 cm left lower lung nodule does not demonstrate significant hypermetabolism   No hypermetabolic metastases visualized  - CTA chest 2/14: Stable right upper and left lower pulmonary nodules measuring 1 2 x 1 3 cm  - CTA chest 3/12: Stable nodules in the right upper lobe and left lower lobe   Follow-up for the nodule on the left has been recommended   Tissue sampling for the nodule on the right is planned    - Follows outpatient with Dr Harshil Roman to Pulmonology   Alpha-1 antitrypsin deficiency   - Continue weekly Zemaira on Tuesdays   - Pulmonology consult  Hypertension   - /78 present on admission   - Restart home medication Amlodipine 10 mg daily  - Monitor vitals    GERD  - stable  - Start Protonix 40mg PO daily    BPH   - Stable  - Restart home medication Flomax 0 4mg PO daily  Insomnia  - Restart home medication Zolpidem 10mg daily qhs   Hx of Vertigo   - Statble  - Restart home medication Meclizine 12 5mg PO PRN   Global   - Cardiac diet  - IV NS 75 cc/h   - Replete electrolytes as necessary   - Eliquis and SCDs        Admission Orders:  TELE MON  BIPAP PRN  O2  3L  CONSULT PULMONOLOGY    Scheduled Meds:   Current Facility-Administered Medications:  acetaminophen 650 mg Oral Q6H PRN Nell Sahu, DO    amLODIPine 10 mg Oral Daily Nell Sahu, DO    apixaban 5 mg Oral BID Nell Sahu, DO    fluticasone 1 spray Each Nare Daily Nell Sahu, DO    ipratropium-albuterol 3 mL Nebulization Q4H Nell Sahu, DO    ipratropium-albuterol 3 mL Nebulization Q2H PRN Nell Sahu, DO    levofloxacin 750 mg Intravenous Q24H Nell Sahu, DO Last Rate: 750 mg (03/30/18 0204)   meclizine 12 5 mg Oral Daily PRN Nell Sahu, DO    methylPREDNISolone sodium succinate 40 mg Intravenous Q8H Nell Sahu, DO    ondansetron 4 mg Intravenous Q6H PRN Nell Sahu, DO    pantoprazole 40 mg Oral Early Morning Nell Sahu, DO    sodium chloride 75 mL/hr Intravenous Continuous Nell Sahu, DO Last Rate: 75 mL/hr (03/30/18 0204)   tamsulosin 0 4 mg Oral HS Nelljoe Sahu, DO    zolpidem 10 mg Oral HS PRN Nell Sahu, DO      Continuous Infusions:   sodium chloride 75 mL/hr Last Rate: 75 mL/hr (03/30/18 0204)     PRN Meds:   acetaminophen    ipratropium-albuterol    meclizine    ondansetron    zolpidem

## 2018-03-30 NOTE — H&P
H&P Exam - Linda Reich 75 y o  male MRN: [id number]    Unit/Bed#: [address] [place] [address] Encounter: [id number]    Assessment:    74 y/o male with PMH BPH, COPD, Alpha-1 antitrypsin deficiency, HTN, GERD, Insomnia, recent PE and detection of nodules in lungs is admitted for worsening of shortness of breath to the hospital for observation under the supervision of Dr Addison Murray and is expected to stay <2 midnights  Pt is currently full code  Pt is expected to be discharged to home  Plan discussed and agreed upon with Dr Addison Murray  Acute on chronic Shortness of breath likely 2/2 COPD exaccerbation vs Bronchitis vs  Pneumonia vs anxiety   - Significant improvement after placement on BiPAP   - Vitals  99 2F  102HR  32 RR  178/78  89%O2 on RA on presentation   - WBC 7 20,  ,  Trop <0 02  - ABG (on BiPAP): pH 7 429   pCO2 35 9  pO2 198 3  HCO3 22 9  - Chest Xray: Stable chronic changes within the lung fields  No pneumonia or congestive failure  - In ED pt received Ativan 0 5mg IV and Solumedrol 125mg IV  Pt placed on Bipap and then switched to 3L NC   - Start Solumedrol 40mg Q8H    - Start Levaquin 750 Q24H   - Urine legionella and urine strep pneumo ordered   - Duonebs Q4H Mitesh/ Q2H PRN   - Continue Oxygen supplementation via NC 3L   - Telemetry monitoring   - Last ECHO 7/17:  37-60%, grade 1 diastolic dysfunction   - Consult to Pulmonology      Acute Kidney Injury   - Cr 1 44 present on admission   - IV NS 75 cc/h   - Baseline 1-1 15   - Will monitor daily AM BMP     Hx of recent PE   - CTA Chest 2/14: Acute pulmonary embolus in the distal left main pulmonary artery and proximal left lower lobe segmental branches  - CTA chest 3/12: Pulmonary embolism in the distal left main pulmonary artery and segmental branch to the left lower lobe  This appears smaller compared to the prior study but is not completely resolved    - Continue home medication Eliquis  - Consult to Pulmonology     Neoplasm of unknown certainty of lung  - PET CT 2/12: 1 2 x 0 8 cm right upper lung nodule demonstrates hypermetabolism and is most concerning for malignancy  Tissue sampling recommended  1 3 x 0 6 cm left lower lung nodule does not demonstrate significant hypermetabolism  No hypermetabolic metastases visualized  - CTA chest 2/14: Stable right upper and left lower pulmonary nodules measuring 1 2 x 1 3 cm  - CTA chest 3/12: Stable nodules in the right upper lobe and left lower lobe  Follow-up for the nodule on the left has been recommended  Tissue sampling for the nodule on the right is planned  - Follows outpatient with Dr Radha Peña to Pulmonology     Alpha-1 antitrypsin deficiency   - Continue weekly Zemaira on Tuesdays   - Pulmonology consult    Hypertension   - /78 present on admission   - Restart home medication Amlodipine 10 mg daily  - Monitor vitals     GERD  - stable  - Start Protonix 40mg PO daily      BPH   - Stable  - Restart home medication Flomax 0 4mg PO daily    Insomnia  - Restart home medication Zolpidem 10mg daily qhs     Hx of Vertigo   - Statble  - Restart home medication Meclizine 12 5mg PO PRN     Global   - Cardiac diet  - IV NS 75 cc/h   - Replete electrolytes as necessary   - Eliquis and SCDs         History of Present Illness     76 y/o male with PMH BPH, COPD, Alpha-1 antitrypsin deficiency, HTN, GERD, Insomnia, recent PE and detection of nodules in lungs presents with worsening of SOB for the past 2 days  Pt states that he has shortness of breath for several weeks now, but the past few days he found it extremely difficult to breath  He has noticed that the shortness of breath is worse with exertion and improves when he is laying down with one pillow propping him up  Pt is on continues use of 3L O2 at home  In the past few days pt did nebulizer treatments every 6 hours and used his inhalers but nothing was providing him any relief   Pt states he has been feeling more hot lately, but no fever or chills  For the past week he has been experiencing upper respiratory symptoms of runny nose, mild congestion and some ear pain  Additionally he has a worsening of a non-productive cough, which has worsening especially in the past few days  Pt states that he has a lot appetite and feeling more fatigued in the last few days  He has noticed a numbness and tingling down the right side of his hands and legs  PT was recently admitted to the hospital for PE after which he has been started on Eliquis  He was discharged on a Prednisone taper which he completed 2 days ago  Pt is following outpatient with Dr Joe Roberts and saw him in the office today  In the ED patient received Solumedrol 125mg IV and Ativan 0 5mg IV     Review of Systems   Constitutional: Positive for activity change, appetite change, diaphoresis and fatigue  Negative for chills and fever  HENT: Positive for congestion, ear pain and rhinorrhea  Negative for sinus pain, sneezing, sore throat and trouble swallowing  Eyes: Negative for photophobia, pain, discharge and itching  Respiratory: Positive for apnea, cough and shortness of breath  Negative for choking, chest tightness and wheezing  Cardiovascular: Negative for chest pain, palpitations and leg swelling  Gastrointestinal: Negative for abdominal pain, blood in stool, constipation, diarrhea, nausea and vomiting  Genitourinary: Positive for decreased urine volume  Negative for dysuria, frequency, hematuria and urgency  Musculoskeletal: Negative for arthralgias, back pain, gait problem, joint swelling, myalgias, neck pain and neck stiffness  Skin: Negative for color change, pallor, rash and wound  Neurological: Positive for weakness and numbness  Negative for dizziness, tremors, seizures, light-headedness and headaches  Psychiatric/Behavioral: Negative for agitation, behavioral problems, confusion, decreased concentration and dysphoric mood   The patient is not nervous/anxious  Historical Information   Past Medical History:   Diagnosis Date    Anesthesia complication     Difficult to wake up    Arthritis     BPH (benign prostatic hyperplasia)     urinary frequency    Cancer (HCC)     basal cell neck, face    COPD (chronic obstructive pulmonary disease) (HCC)     Full dentures     Hypertension     controlled    Kidney stone     at least 7 episodes    Liver disease     Alpha 1- enzyme deficiency - diagnosed 2002  has been on weekly replacement therapy since then    Pulmonary emphysema ([facility] [address] )     1/25/15  FEV1 - 2 45 liters or 59% of predicted    Wears glasses     for driving only     Past Surgical History:   Procedure Laterality Date    BACK SURGERY  2008    discectomy    COLONOSCOPY      COLONOSCOPY N/A 3/10/2017    Procedure: Helenain Stas;  Surgeon: Julia Sommers MD;  Location: [place] [address] GI LAB; Service:    [facility]      removal of kidney stones    ESOPHAGOGASTRODUODENOSCOPY N/A 3/10/2017    Procedure: ESOPHAGOGASTRODUODENOSCOPY (EGD); Surgeon: Julia Sommers MD;  Location: [facility] GI LAB;   Service:     LITHOTRIPSY      TONSILLECTOMY      VEIN LIGATION AND STRIPPING Bilateral     1980's     Social History   History   Alcohol Use No     History   Drug Use No     History   Smoking Status    Former Smoker    Packs/day: 1 00    Years: 60 00    Quit date: 8/31/2017   Smokeless Tobacco    Never Used     Comment: quit in august 2017     Family History: non-contributory    Meds/Allergies   all medications and allergies reviewed  Allergies   Allergen Reactions    Chantix [Varenicline]      Caused prostate infection       Objective    First Vitals:   Blood Pressure: (!) 178/78 (03/29/18 1914)  Pulse: 102 (03/29/18 1914)  Temperature: 99 2 °F (37 3 °C) (03/29/18 1914)  Temp Source: Tympanic (03/29/18 1914)  Respirations: (!) 32 (03/29/18 1914)  Height: 6' 5" (195 6 cm) (03/29/18 1914)  Weight - Scale: 81 6 kg (180 lb) (03/29/18 1914)  SpO2: (!) 89 % (03/29/18 1914)    Current Vitals:   Blood Pressure: 168/77 (03/29/18 2217)  Pulse: 82 (03/29/18 2217)  Temperature: 98 5 °F (36 9 °C) (03/29/18 2217)  Temp Source: Oral (03/29/18 2217)  Respirations: (!) 24 (03/29/18 2217)  Height: 6' 5" (195 6 cm) (03/29/18 2217)  Weight - Scale: 81 2 kg (179 lb) (03/29/18 2217)  SpO2: 96 % (03/29/18 2217)    No intake or output data in the 24 hours ending 03/29/18 2228    Invasive Devices     Peripheral Intravenous Line            Peripheral IV 03/29/18 Left Forearm less than 1 day                Physical Exam   Constitutional: He is oriented to person, place, and time  He appears well-developed and well-nourished  No distress  HENT:   Head: Normocephalic and atraumatic  Right Ear: External ear normal    Eyes: Conjunctivae and EOM are normal  Pupils are equal, round, and reactive to light  Right eye exhibits no discharge  Left eye exhibits no discharge  No scleral icterus  Neck: Normal range of motion  Neck supple  No JVD present  No tracheal deviation present  No thyromegaly present  Cardiovascular: Normal rate, regular rhythm, normal heart sounds and normal pulses  Pulmonary/Chest: No stridor  Tachypnea noted  He is in respiratory distress  He has decreased breath sounds in the left lower field  He exhibits mass, tenderness, edema and swelling  Abdominal: Soft  Bowel sounds are normal  He exhibits no distension and no mass  There is no tenderness  There is no rebound and no guarding  Musculoskeletal: Normal range of motion  He exhibits no edema, tenderness or deformity  Lymphadenopathy:     He has no cervical adenopathy  Neurological: He is alert and oriented to person, place, and time  No cranial nerve deficit  Skin: No rash noted  He is diaphoretic  No erythema  Psychiatric: He has a normal mood and affect  His behavior is normal  Judgment and thought content normal    Nursing note and vitals reviewed        Lab Results:   Results for orders placed or performed during the hospital encounter of 26/60/29   Basic metabolic panel   Result Value Ref Range    Sodium 139 136 - 145 mmol/L    Potassium 3 5 3 5 - 5 3 mmol/L    Chloride 103 100 - 108 mmol/L    CO2 25 21 - 32 mmol/L    Anion Gap 11 4 - 13 mmol/L    BUN 19 5 - 25 mg/dL    Creatinine 1 44 (H) 0 60 - 1 30 mg/dL    Glucose 133 65 - 140 mg/dL    Calcium 9 6 8 3 - 10 1 mg/dL    eGFR 47 ml/min/1 73sq m   NT-BNP PRO   Result Value Ref Range    NT-proBNP 141 (H) <125 pg/mL   CBC and differential   Result Value Ref Range    WBC 7 20 4 80 - 10 80 Thousand/uL    RBC 4 34 (L) 4 70 - 6 10 Million/uL    Hemoglobin 14 1 14 0 - 18 0 g/dL    Hematocrit 41 8 (L) 42 0 - 52 0 %    MCV 96 82 - 98 fL    MCH 32 6 (H) 27 0 - 31 0 pg    MCHC 33 8 31 4 - 37 4 g/dL    RDW 14 1 11 6 - 15 1 %    MPV 6 1 (L) 8 9 - 12 7 fL    Platelets 927 530 - 387 Thousands/uL    nRBC 0 /100 WBCs    Neutrophils Relative 72 43 - 75 %    Lymphocytes Relative 16 14 - 44 %    Monocytes Relative 9 4 - 12 %    Eosinophils Relative 2 0 - 6 %    Basophils Relative 0 0 - 1 %    Neutrophils Absolute 5 20 1 85 - 7 62 Thousands/µL    Lymphocytes Absolute 1 20 0 60 - 4 47 Thousands/µL    Monocytes Absolute 0 70 0 17 - 1 22 Thousand/µL    Eosinophils Absolute 0 10 0 00 - 0 61 Thousand/µL    Basophils Absolute 0 00 0 00 - 0 10 Thousands/µL   Protime-INR   Result Value Ref Range    Protime 11 4 9 4 - 11 7 seconds    INR 1 08 0 86 - 1 16   APTT   Result Value Ref Range    PTT 26 24 - 33 seconds   Troponin I   Result Value Ref Range    Troponin I <0 02 <=0 04 ng/mL   Blood gas, arterial   Result Value Ref Range    pH, Arterial 7 429 7 350 - 7 450    PH ART TC 7 425 7 350 - 7 450    pCO2, Arterial 35 4 (L) 36 0 - 44 0 mm Hg    PCO2 (TC) Arterial 35 9 (L) 36 0 - 44 0 mm Hg    pO2, Arterial 196 7 (H) 75 0 - 129 0 mm Hg    PO2 (TC) Arterial 198 3 (H) 75 0 - 129 0 mm Hg    HCO3, Arterial 22 9 22 0 - 28 0 mmol/L    Base Excess, Arterial -0 9 mmol/L    O2 Content, Arterial 20 0 16 0 - 23 0 mL/dL    O2 HGB,Arterial  98 4 (H) 94 0 - 97 0 %    SOURCE Radial, Left     FLORENCE TEST Yes     Temperature 99 2 Degrees Fehrenheit    Non Vent type BIPAP BIPAP     IPAP 12     EPAP 6     BIPAP fio2 50 %       Imaging:   XR chest portable   Final Result      Stable chronic changes within the lung fields  No pneumonia or congestive failure  Workstation performed: [id number]           EKG, Pathology, and Other Studies: Normal sinus rhythm, possible inferior wall MI of no known origin     Code Status: Full Code     Counseling / Coordination of Care: Total floor / unit time spent today 45 minutes  and Greater than 50% of total time was spent with the patient and / or family counseling and / or coordination of care  A description of the counseling / coordination of care: Sam Ramos

## 2018-03-30 NOTE — PROGRESS NOTES
Assessment/Plan:     Problem List Items Addressed This Visit        Digestive    AAT (alpha-1-antitrypsin) deficiency ([facility] [address] )     Toshia Tracy does receive weekly alpha-1 antitrypsin replacement therapy at his home  This is administered by a visiting nurse            Respiratory    Chronic hypoxemic respiratory failure ([facility] )     Toshia Tracy is using 3 l/m of oxygen but with the battery operated oxygen concentrator his oxygen saturation decreased to 86% with ambulation  With ambulating with 3 l/m continuous oxygen lowest O2 sat is 92%  Acute bronchitis due to other specified organisms     Camilo has cough productive for white and yellow mucous  His cough is worse over the past few day No fever or chills      I will prescribe Azithromycin 5 day pack  RESOLVED: Acute hypoxemic respiratory failure (HCC)    RESOLVED: Pulmonary embolism (HCC)     Status post pulmonry embolism in February 2018 for which he will continue Eliquis  He is tolerating this well  Other    Dyspnea - Primary    Relevant Orders    POCT spirometry (Completed)    Echo complete with contrast if indicated    Lung nodules     He has 2 small lung nodules which are being followed  These may be inflammatory but neoplasm is a possibilty  Follow up CT of chest will be done at later time  Return in about 1 month (around 4/29/2018)  All questions are answered to the patient's satisfaction and understanding  He verbalizes understanding  He is encouraged to call with any further questions or concerns  Portions of the record may have been created with voice recognition software  Occasional wrong word or "sound a like" substitutions may have occurred due to the inherent limitations of voice recognition software  Read the chart carefully and recognize, using context, where substitutions have occurred      ______________________________________________________________________    Chief Complaint: No chief complaint on file       Patient ID: Mercy Acevedo is a 75 y o  y o  male has a past medical history of Anesthesia complication; Arthritis; BPH (benign prostatic hyperplasia); Cancer [facility]); COPD (chronic obstructive pulmonary disease) ([facility] [address] ); Full dentures; Hypertension; Kidney stone; Liver disease; Pulmonary emphysema ([facility] [address] ); and Wears glasses  3/29/2018  WILL Page complains of shortness of Breath minimal activity  He is not having any chest pain or leg edema  Does have some intermittent cough sometimes productive for small amount of white and yellow mucus  No fever or chills  No hemoptysis  He does use 3 L of oxygen on a continuous basis  He recent completed a course of prednisone which he felt did not help his breathing  He uses oxygen 3//m nc and has a battery operated oxygen concentrator  He does take eliquis as he has a history of pulmonary embolism in February 2018  He is going to pulmonary rehab but having difficulty doing much as he gets very short of breath  Just walking 50 feet makes hin short of breath  Review of Systems   Constitutional: Positive for fatigue  HENT: Negative for congestion and sore throat  Eyes: Negative for redness and itching  Respiratory: Positive for cough and shortness of breath  Cardiovascular: Negative for leg swelling  Gastrointestinal: Positive for abdominal distention  Negative for abdominal pain  Endocrine: Negative for polydipsia and polyphagia  Musculoskeletal: Negative for arthralgias  Skin: Negative for rash  Neurological: Negative for dizziness  Psychiatric/Behavioral: Negative for confusion  Smoking history: He reports that he quit smoking about 7 months ago  He has a 60 00 pack-year smoking history   He has never used smokeless tobacco     The following portions of the patient's history were reviewed and updated as appropriate: allergies, current medications, past family history, past medical history, past social history, past surgical history and problem list     Immunization History   Administered Date(s) Administered    Influenza TIV (IM) 09/27/2013, 10/23/2014    Pneumococcal Conjugate 13-Valent 04/26/2016    Pneumococcal Polysaccharide PPV23 04/23/2011    Tdap 04/26/2016    Zoster 10/29/2014, 04/27/2015     Current Outpatient Prescriptions   Medication Sig Dispense Refill    albuterol (2 5 mg/3 mL) 0 083 % nebulizer solution Inhale 1 each every 4 (four) hours as needed      albuterol (PROVENTIL HFA,VENTOLIN HFA) 90 mcg/act inhaler Inhale 2 puffs every 6 (six) hours as needed for wheezing 1 Inhaler 0    Alpha1-Proteinase Inhibitor (ZEMAIRA IV) Infuse into a venous catheter once a week On tuesday -1000mg /50 ml       amLODIPine (NORVASC) 10 mg tablet Take 1 tablet (10 mg total) by mouth daily 30 tablet 0    apixaban (ELIQUIS) 5 mg Take 1 tablet (5 mg total) by mouth 2 (two) times a day 60 tablet 0    fluticasone (FLONASE) 50 mcg/act nasal spray       fluticasone-salmeterol (ADVAIR DISKUS) 250-50 mcg/dose inhaler Inhale      meclizine (ANTIVERT) 12 5 MG tablet Take 1 tablet by mouth daily      omeprazole (PriLOSEC) 40 MG capsule       tamsulosin (FLOMAX) 0 4 mg Take 0 4 mg by mouth daily at bedtime      zolpidem (AMBIEN CR) 6 25 MG CR tablet Take 10 mg by mouth daily at bedtime as needed for sleep      predniSONE 10 mg tablet Take 40mg for 3 days then 30mg x3 days, 20mg x3 days, then 10mg x3days then stop 30 tablet 0    theophylline (ESTEFANIA-24) 400 MG 24 hr capsule Take 1 capsule (400 mg total) by mouth daily 30 capsule 0     No current facility-administered medications for this visit        Allergies: Chantix [varenicline]    Objective:  Vitals:    03/29/18 0840 03/29/18 0843   BP: 158/80    Pulse: 94    Temp: 98 3 °F (36 8 °C)    SpO2:  93%   Weight: 80 7 kg (178 lb)    Height: 6' 5" (1 956 m)    Oxygen Therapy  SpO2: 93 %  Oxygen Therapy: Supplemental oxygen  O2 Delivery Method: Nasal cannula  O2 Flow Rate (L/min): 2 L/min    Wt Readings from Last 3 Encounters:   03/29/18 81 2 kg (179 lb)   03/29/18 80 7 kg (178 lb)   03/23/18 80 3 kg (177 lb)     Body mass index is 21 11 kg/m²  Physical Exam   Constitutional: He is oriented to person, place, and time  Thin male, alert, mild exertional dyspnea at times   HENT:   Head: Normocephalic  Nose: Nose normal    Mouth/Throat: Oropharynx is clear and moist  No oropharyngeal exudate  Eyes: Conjunctivae are normal    Neck: Neck supple  No JVD present  Cardiovascular: Normal rate, regular rhythm and normal heart sounds  Pulmonary/Chest: Effort normal  He has no wheezes  He has no rales  Lung sounds are diminished bilaterally but clear   Abdominal: Soft  He exhibits no distension  There is no tenderness  Musculoskeletal:   Has some very minimal edema of lower tibial regions  No clubbing   Lymphadenopathy:     He has no cervical adenopathy  Neurological: He is alert and oriented to person, place, and time  Skin: Skin is warm and dry  No erythema  Psychiatric: He has a normal mood and affect  His behavior is normal        Diagnostics:  I have personally reviewed pertinent reports  CT of chest performed on  revealed   Office Spirometry Results:     ESS:    Xr Chest Portable    Result Date: 3/29/2018  Narrative: CHEST INDICATION:   sob  Worsening symptoms  COMPARISON:  2/14/2018 EXAM PERFORMED/VIEWS:  XR CHEST PORTABLE FINDINGS: Cardiomediastinal silhouette appears unremarkable  There is a pleural-based bulla identified within the right lung base with mild right basilar scarring  No pneumonia  No effusions  No pneumothorax  Slightly hyperinflated lung fields suggesting underlying COPD  Osseous structures appear within normal limits for patient age  Impression: Stable chronic changes within the lung fields  No pneumonia or congestive failure   Workstation performed: [id number]     Cta Chest Pe Study    Result Date: 3/12/2018  Narrative: CTA - CHEST WITH IV CONTRAST - PULMONARY ANGIOGRAM INDICATION:   Shortness of breath  Chest pain  Recent PE COMPARISON: 02/14/2018 TECHNIQUE: CTA examination of the chest was performed using angiographic technique according to a protocol specifically tailored to evaluate for pulmonary embolism  Axial, sagittal, and coronal 2D reformatted images were created from the source data and  submitted for interpretation  In addition, coronal 3D MIP postprocessing was performed on the acquisition scanner  Radiation dose length product (DLP) for this visit:  699 15 mGy-cm   This examination, like all CT scans performed in the [facility], was performed utilizing techniques to minimize radiation dose exposure, including the use of iterative  reconstruction and automated exposure control  IV Contrast:  85 mL of iohexol (OMNIPAQUE)  FINDINGS: PULMONARY ARTERIAL TREE:  Filling defect in the distal left pulmonary artery and left lower lobe segmental branch appears smaller but not completely resolved  LUNGS:  Stable right upper lobe and left lower lobe nodules  Emphysema  No pneumonia  PLEURA:  Unremarkable  HEART/AORTA:  Unremarkable for patient's age  MEDIASTINUM AND MIKE:  Hiatal hernia  CHEST WALL AND LOWER NECK:   Unremarkable  VISUALIZED STRUCTURES IN THE UPPER ABDOMEN:  Left renal cyst   Atrophy of the right kidney  OSSEOUS STRUCTURES:  No acute fracture or destructive osseous lesion  Impression: 1  Pulmonary embolism in the distal left main pulmonary artery and segmental branch to the left lower lobe  This appears smaller compared to the prior study but is not completely resolved  2   Emphysema  3   Stable nodules in the right upper lobe and left lower lobe  Follow-up for the nodule on the left has been recommended  Tissue sampling for the nodule on the right is planned   Workstation performed: [id number]

## 2018-03-31 ENCOUNTER — APPOINTMENT (INPATIENT)
Dept: NON INVASIVE DIAGNOSTICS | Facility: HOSPITAL | Age: 73
DRG: 189 | End: 2018-03-31
Payer: MEDICARE

## 2018-03-31 LAB
ANION GAP SERPL CALCULATED.3IONS-SCNC: 10 MMOL/L (ref 4–13)
BASOPHILS # BLD AUTO: 0 THOUSANDS/ΜL (ref 0–0.1)
BASOPHILS NFR BLD AUTO: 0 % (ref 0–1)
BUN SERPL-MCNC: 19 MG/DL (ref 5–25)
CALCIUM SERPL-MCNC: 9.1 MG/DL (ref 8.3–10.1)
CHLORIDE SERPL-SCNC: 104 MMOL/L (ref 100–108)
CO2 SERPL-SCNC: 24 MMOL/L (ref 21–32)
CREAT SERPL-MCNC: 1.03 MG/DL (ref 0.6–1.3)
EOSINOPHIL # BLD AUTO: 0 THOUSAND/ΜL (ref 0–0.61)
EOSINOPHIL NFR BLD AUTO: 0 % (ref 0–6)
ERYTHROCYTE [DISTWIDTH] IN BLOOD BY AUTOMATED COUNT: 14.2 % (ref 11.6–15.1)
GFR SERPL CREATININE-BSD FRML MDRD: 71 ML/MIN/1.73SQ M
GLUCOSE SERPL-MCNC: 133 MG/DL (ref 65–140)
HCT VFR BLD AUTO: 36.4 % (ref 42–52)
HGB BLD-MCNC: 12.3 G/DL (ref 14–18)
LYMPHOCYTES # BLD AUTO: 0.4 THOUSANDS/ΜL (ref 0.6–4.47)
LYMPHOCYTES NFR BLD AUTO: 6 % (ref 14–44)
MAGNESIUM SERPL-MCNC: 1.9 MG/DL (ref 1.6–2.6)
MCH RBC QN AUTO: 32.8 PG (ref 27–31)
MCHC RBC AUTO-ENTMCNC: 33.8 G/DL (ref 31.4–37.4)
MCV RBC AUTO: 97 FL (ref 82–98)
MONOCYTES # BLD AUTO: 0.3 THOUSAND/ΜL (ref 0.17–1.22)
MONOCYTES NFR BLD AUTO: 5 % (ref 4–12)
MRSA NOSE QL CULT: NORMAL
NEUTROPHILS # BLD AUTO: 6.3 THOUSANDS/ΜL (ref 1.85–7.62)
NEUTS SEG NFR BLD AUTO: 90 % (ref 43–75)
NRBC BLD AUTO-RTO: 0 /100 WBCS
PHOSPHATE SERPL-MCNC: 3.2 MG/DL (ref 2.3–4.1)
PLATELET # BLD AUTO: 138 THOUSANDS/UL (ref 130–400)
PLATELET BLD QL SMEAR: ADEQUATE
PMV BLD AUTO: 6.7 FL (ref 8.9–12.7)
POTASSIUM SERPL-SCNC: 3.9 MMOL/L (ref 3.5–5.3)
PROCALCITONIN SERPL-MCNC: <0.05 NG/ML
RBC # BLD AUTO: 3.75 MILLION/UL (ref 4.7–6.1)
SODIUM SERPL-SCNC: 138 MMOL/L (ref 136–145)
WBC # BLD AUTO: 7 THOUSAND/UL (ref 4.8–10.8)

## 2018-03-31 PROCEDURE — 84145 PROCALCITONIN (PCT): CPT | Performed by: STUDENT IN AN ORGANIZED HEALTH CARE EDUCATION/TRAINING PROGRAM

## 2018-03-31 PROCEDURE — 94640 AIRWAY INHALATION TREATMENT: CPT

## 2018-03-31 PROCEDURE — 99232 SBSQ HOSP IP/OBS MODERATE 35: CPT | Performed by: FAMILY MEDICINE

## 2018-03-31 PROCEDURE — 99231 SBSQ HOSP IP/OBS SF/LOW 25: CPT | Performed by: INTERNAL MEDICINE

## 2018-03-31 PROCEDURE — 93306 TTE W/DOPPLER COMPLETE: CPT | Performed by: INTERNAL MEDICINE

## 2018-03-31 PROCEDURE — 93306 TTE W/DOPPLER COMPLETE: CPT

## 2018-03-31 PROCEDURE — 80048 BASIC METABOLIC PNL TOTAL CA: CPT | Performed by: STUDENT IN AN ORGANIZED HEALTH CARE EDUCATION/TRAINING PROGRAM

## 2018-03-31 PROCEDURE — 84100 ASSAY OF PHOSPHORUS: CPT | Performed by: STUDENT IN AN ORGANIZED HEALTH CARE EDUCATION/TRAINING PROGRAM

## 2018-03-31 PROCEDURE — 85025 COMPLETE CBC W/AUTO DIFF WBC: CPT | Performed by: STUDENT IN AN ORGANIZED HEALTH CARE EDUCATION/TRAINING PROGRAM

## 2018-03-31 PROCEDURE — 94660 CPAP INITIATION&MGMT: CPT

## 2018-03-31 PROCEDURE — 83735 ASSAY OF MAGNESIUM: CPT | Performed by: STUDENT IN AN ORGANIZED HEALTH CARE EDUCATION/TRAINING PROGRAM

## 2018-03-31 PROCEDURE — 94760 N-INVAS EAR/PLS OXIMETRY 1: CPT

## 2018-03-31 RX ORDER — MAGNESIUM SULFATE HEPTAHYDRATE 40 MG/ML
2 INJECTION, SOLUTION INTRAVENOUS ONCE
Status: COMPLETED | OUTPATIENT
Start: 2018-03-31 | End: 2018-03-31

## 2018-03-31 RX ORDER — IPRATROPIUM BROMIDE AND ALBUTEROL SULFATE 2.5; .5 MG/3ML; MG/3ML
3 SOLUTION RESPIRATORY (INHALATION)
Status: DISCONTINUED | OUTPATIENT
Start: 2018-03-31 | End: 2018-04-04 | Stop reason: HOSPADM

## 2018-03-31 RX ADMIN — MAGNESIUM SULFATE HEPTAHYDRATE 2 G: 40 INJECTION, SOLUTION INTRAVENOUS at 09:08

## 2018-03-31 RX ADMIN — AMLODIPINE BESYLATE 10 MG: 10 TABLET ORAL at 08:28

## 2018-03-31 RX ADMIN — IPRATROPIUM BROMIDE AND ALBUTEROL SULFATE 3 ML: .5; 3 SOLUTION RESPIRATORY (INHALATION) at 20:02

## 2018-03-31 RX ADMIN — PANTOPRAZOLE SODIUM 40 MG: 40 TABLET, DELAYED RELEASE ORAL at 05:28

## 2018-03-31 RX ADMIN — TAMSULOSIN HYDROCHLORIDE 0.4 MG: 0.4 CAPSULE ORAL at 21:55

## 2018-03-31 RX ADMIN — LEVOFLOXACIN 750 MG: 5 INJECTION, SOLUTION INTRAVENOUS at 01:35

## 2018-03-31 RX ADMIN — APIXABAN 5 MG: 5 TABLET, FILM COATED ORAL at 08:28

## 2018-03-31 RX ADMIN — METHYLPREDNISOLONE SODIUM SUCCINATE 20 MG: 40 INJECTION, POWDER, FOR SOLUTION INTRAMUSCULAR; INTRAVENOUS at 03:08

## 2018-03-31 RX ADMIN — ZOLPIDEM TARTRATE 10 MG: 5 TABLET ORAL at 21:55

## 2018-03-31 RX ADMIN — IPRATROPIUM BROMIDE AND ALBUTEROL SULFATE 3 ML: .5; 3 SOLUTION RESPIRATORY (INHALATION) at 07:55

## 2018-03-31 RX ADMIN — FLUTICASONE PROPIONATE 1 SPRAY: 50 SPRAY, METERED NASAL at 08:29

## 2018-03-31 RX ADMIN — APIXABAN 5 MG: 5 TABLET, FILM COATED ORAL at 17:21

## 2018-03-31 RX ADMIN — METHYLPREDNISOLONE SODIUM SUCCINATE 20 MG: 40 INJECTION, POWDER, FOR SOLUTION INTRAMUSCULAR; INTRAVENOUS at 18:02

## 2018-03-31 RX ADMIN — METHYLPREDNISOLONE SODIUM SUCCINATE 20 MG: 40 INJECTION, POWDER, FOR SOLUTION INTRAMUSCULAR; INTRAVENOUS at 11:13

## 2018-03-31 RX ADMIN — IPRATROPIUM BROMIDE AND ALBUTEROL SULFATE 3 ML: .5; 3 SOLUTION RESPIRATORY (INHALATION) at 14:34

## 2018-03-31 NOTE — PROGRESS NOTES
03/31/18 1100 03/31/18 1105 03/31/18 1108   Vital Signs   Patient Position - Orthostatic VS Lying Sitting Standing   Oxygen Therapy   SpO2 97 % 97 % 97 %   SpO2 Activity At Rest At Rest At Rest   O2 Device Nasal cannula Nasal cannula Nasal cannula   O2 Flow Rate (L/min) 3 L/min 3 L/min 3 L/min      Pt stated at a consistent 97% while lying, sitting and standing, however did become more dyspneic with more activity

## 2018-03-31 NOTE — PROGRESS NOTES
[facility] Progress Note - Haja Kinney 75 y o  male MRN: [id number]    Unit/Bed#: [address] [place] [address] Encounter: [id number]      Assessment/Plan:  Acute on chronic SOB is likely 2/2 COPD exacerbation vs pneumonia vs bronchitis:  On home 3 L Nc, Monitor vitals, keep pulse oximetry greater than 92%   Solu-Medrol  20 mg q 8h due to absence of wheezing  Continue Levaquin 750 mg daily  Duo nebs every 4 hours scheduled & every 2 hours p r n  Continue telemetry  Urine Legionella and strep pneumonia negative  Procalcitonin ordered  Pulmonology consulted  Echo pending      H/O Recent PE :  2/14/18 acute PE in distal left main pulmonary artery  Continue home Eliquis  Pulmonology consult     Neoplasm of unknown certainty of lung:  PET-CT 2/12/18 right upper lung nodule noted  Pulmonology consulted, follows with Dr Ed Carrero who will see him today     Alpha 1 antitrypsin deficiency:   Continue weekly Zemaira every Tuesday per outpt regimen  Pulmonology consulted will evaluate today     CLAYTON resolved:  POA Cr 1 44, today 1 03,   Daily BMPs     HTN:  135/64, stable, continue home amlodipine 10 mg daily     GERD:  Continue Protonix 40 mg daily     BPH:  Continue home Flomax 0 4mg daily     Insomnia:  Continue home zolpidem 10 mg q h s      H/O vertigo:  Continue home meclizine 12 5 mg p r n      FEN & DVT PPX:  Cardiac diet, replete electrolytes as needed  Eliquis and SCDs       Subjective:   Patient seen examined at the bedside  Reports he only experiences shortness of breath when getting up and ambulating to the bathroom  At rest he is comfortable on 3 liters nasal cannula oxygen  Objective:     Vitals: Blood pressure 140/67, pulse 68, temperature 98 1 °F (36 7 °C), temperature source Oral, resp  rate 20, height 6' 5" (1 956 m), weight 81 2 kg (179 lb), SpO2 96 %  ,Body mass index is 21 23 kg/m²    Wt Readings from Last 3 Encounters:   03/29/18 81 2 kg (179 lb)   03/29/18 80 7 kg (178 lb)   03/23/18 80 3 kg (177 lb) Intake/Output Summary (Last 24 hours) at 03/31/18 0651  Last data filed at 03/31/18 0501   Gross per 24 hour   Intake              840 ml   Output              650 ml   Net              190 ml       Physical Exam:   General: Pt is AAO x 3, not in any acute distress, 3 liters nasal cannula oxygen  Cardio: RRR, S1 and S2 +, no murmurs/rubs/gallops/clicks  Resp: CTA b/l, no wheezes/rales/rhonchi, diminished breath sounds bilaterally right worse than left  Abdomen: soft, NT/ND, BS+  Extremities: no cyanosis or edema  PP 2+ b/l       Recent Results (from the past 24 hour(s))   Magnesium    Collection Time: 03/31/18  5:25 AM   Result Value Ref Range    Magnesium 1 9 1 6 - 2 6 mg/dL   Phosphorus    Collection Time: 03/31/18  5:25 AM   Result Value Ref Range    Phosphorus 3 2 2 3 - 4 1 mg/dL   Basic metabolic panel    Collection Time: 03/31/18  5:25 AM   Result Value Ref Range    Sodium 138 136 - 145 mmol/L    Potassium 3 9 3 5 - 5 3 mmol/L    Chloride 104 100 - 108 mmol/L    CO2 24 21 - 32 mmol/L    Anion Gap 10 4 - 13 mmol/L    BUN 19 5 - 25 mg/dL    Creatinine 1 03 0 60 - 1 30 mg/dL    Glucose 133 65 - 140 mg/dL    Calcium 9 1 8 3 - 10 1 mg/dL    eGFR 71 ml/min/1 73sq m       Current Facility-Administered Medications   Medication Dose Route Frequency Provider Last Rate Last Dose    acetaminophen (TYLENOL) tablet 650 mg  650 mg Oral Q6H PRN Nell Sahu DO        amLODIPine (NORVASC) tablet 10 mg  10 mg Oral Daily Nell Sahu DO   10 mg at 03/30/18 1009    apixaban (ELIQUIS) tablet 5 mg  5 mg Oral BID Nell Sahu DO   5 mg at 03/30/18 1724    fluticasone (FLONASE) 50 mcg/act nasal spray 1 spray  1 spray Each Nare Daily Nell Sahu DO   1 spray at 03/30/18 1010    ipratropium-albuterol (DUO-NEB) 0 5-2 5 mg/3 mL inhalation solution 3 mL  3 mL Nebulization Q2H PRN Nell Sahu DO   3 mL at 03/30/18 1020    ipratropium-albuterol (DUO-NEB) 0 5-2 5 mg/3 mL inhalation solution 3 mL  3 mL Nebulization Q6H Nell Sahu, DO        levofloxacin (LEVAQUIN) IVPB (premix) 750 mg  750 mg Intravenous Q24H Nell Sahu  mL/hr at 03/31/18 0135 750 mg at 03/31/18 0135    meclizine (ANTIVERT) tablet 12 5 mg  12 5 mg Oral Daily PRN Nell Sahu,         methylPREDNISolone sodium succinate (Solu-MEDROL) injection 20 mg  20 mg Intravenous Q8H Keke Jefferson MD   20 mg at 03/31/18 0308    ondansetron (ZOFRAN) injection 4 mg  4 mg Intravenous Q6H PRN Nell Sahu DO        pantoprazole (PROTONIX) EC tablet 40 mg  40 mg Oral Early Morning Nell Sahu, DO   40 mg at 03/31/18 0528    tamsulosin (FLOMAX) capsule 0 4 mg  0 4 mg Oral HS Nell Sahu, DO   0 4 mg at 03/30/18 2152    zolpidem (AMBIEN) tablet 10 mg  10 mg Oral HS PRN Nell Sahu, DO   10 mg at 03/30/18 2104       Invasive Devices     Peripheral Intravenous Line            Peripheral IV 03/30/18 Left Antecubital less than 1 day                Royal Hunt MD

## 2018-03-31 NOTE — PROGRESS NOTES
Progress Note - Pulmonary   Jordis Arts 75 y o  male MRN: [id number]  Unit/Bed#: [address] [place] [address] Encounter: [id number]      Assessment/Plan:     1  Shortness of Breath / Platypnea :  As per pt his characteristic of shortness of breath have changed recently that he is more short of breath on sitting or standing and resolved with laying down  Most of his symptoms started after he had RSV infection in dec after which he recovered little bit but is again very symptomatic  Will check for orthodeoxia; Pt to have 2d echo later today; follow up with results    2  COPD exacerbation:  Agree with current steroid/bronchodilator for now    3  H/O pulmonary embolism:  Repeat CT shows complete resolution of clot    4  AAT Def:  Pt on replacement therapy weekly on tue          ______________________________________________________________________    Subjective: Pt seen and examined at bedside  Tele Events:     Vitals:   Temp:  [97 5 °F (36 4 °C)-98 1 °F (36 7 °C)] 97 6 °F (36 4 °C)  HR:  [68-86] 75  Resp:  [20] 20  BP: (140-169)/(67-83) 145/72  Weight (last 2 days)     Date/Time   Weight    03/29/18 2217  81 2 (179)    03/29/18 1914  81 6 (180)            Oxygen Therapy  SpO2: 98 %  O2 Flow Rate (L/min): 3 L/min    IV Infusions:       Nutrition:        Diet Orders            Start     Ordered    03/31/18 0844  Diet Regular; Regular House; Cardiac Step 1  Diet effective now     Question Answer Comment   Diet Type Regular    Regular Regular House    Other Restriction(s): Cardiac Step 1    RD to adjust diet per protocol? Yes        03/31/18 0843    03/30/18 0955  Room Service  Once     Question:  Type of Service  Answer:  Room Service-Appropriate    03/30/18 0955          Ins/Outs:   I/O       03/29 0701 - 03/30 0700 03/30 0701 - 03/31 0700 03/31 0701 - 04/01 0700    P  O   840     Total Intake(mL/kg)  840 (10 3)     Urine (mL/kg/hr)  650 (0 3)     Total Output   650      Net   +190                   Lines/Drains:  Invasive Devices     Peripheral Intravenous Line            Peripheral IV 03/30/18 Left Antecubital less than 1 day                 Active medications:  Scheduled Meds:  Current Facility-Administered Medications:  acetaminophen 650 mg Oral Q6H PRN Nell Sahu, DO    amLODIPine 10 mg Oral Daily Nell Sahu, DO    apixaban 5 mg Oral BID Nell Sahu, DO    fluticasone 1 spray Each Nare Daily Nell Sahu, DO    ipratropium-albuterol 3 mL Nebulization Q2H PRN Nell Sahu, DO    ipratropium-albuterol 3 mL Nebulization Q6H Nell Sahu, DO    levofloxacin 750 mg Intravenous Q24H Nell Sahu, DO Last Rate: 750 mg (03/31/18 0135)   meclizine 12 5 mg Oral Daily PRN Nell Sahu, DO    methylPREDNISolone sodium succinate 20 mg Intravenous Q8H Jazmine Duran MD    ondansetron 4 mg Intravenous Q6H PRN Nell Sahu, DO    pantoprazole 40 mg Oral Early Morning Nell Sahu, DO    tamsulosin 0 4 mg Oral HS Nell Sahu, DO    zolpidem 10 mg Oral HS PRN Nell Sahu, DO      PRN Meds:  acetaminophen 650 mg Q6H PRN   ipratropium-albuterol 3 mL Q2H PRN   meclizine 12 5 mg Daily PRN   ondansetron 4 mg Q6H PRN   zolpidem 10 mg HS PRN     ____________________________________________________________________      Physical Exam        ____________________________________________________________________    Labs:   CBC:   Results from last 7 days  Lab Units 03/31/18  0525 03/30/18  0509 03/29/18  1921   WBC Thousand/uL 7 00 4 10* 7 20   HEMOGLOBIN g/dL 12 3* 12 6* 14 1   HEMATOCRIT % 36 4* 37 5* 41 8*   MCV fL 97 96 96   PLATELETS Thousands/uL 138 138 155     CMP:   Results from last 7 days  Lab Units 03/31/18  0525 03/30/18  0509 03/29/18  1921   SODIUM mmol/L 138 135* 139   POTASSIUM mmol/L 3 9 4 0 3 5   CHLORIDE mmol/L 104 102 103   CO2 mmol/L 24 22 25   BUN mg/dL 19 20 19   CREATININE mg/dL 1 03 1 21 1 44*   GLUCOSE RANDOM mg/dL 133 165* 133   CALCIUM mg/dL 9 1 8 8 9  6   EGFR ml/min/1 73sq m 71 59 47     Magnesium:   Results from last 7 days  Lab Units 03/31/18  0525   MAGNESIUM mg/dL 1 9     Phosphorous:   Results from last 7 days  Lab Units 03/31/18  0525   PHOSPHORUS mg/dL 3 2     Troponin:   Results from last 7 days  Lab Units 03/30/18  0042 03/29/18  1921   TROPONIN I ng/mL <0 02 <0 02     PT/INR:   Results from last 7 days  Lab Units 03/29/18  1921   PTT seconds 26   INR  1 08     Lactic Acid:     BNP:   Results from last 7 days  Lab Units 03/29/18  1921   NT-PRO BNP pg/mL 141*     TSH:     Procalcitonin: Invalid input(s): PROCALCITONIN          Micro: Lab Results   Component Value Date    BLOODCX No Growth After 5 Days  12/29/2017    BLOODCX No Growth After 5 Days  12/29/2017    MRSACULTURE  02/14/2018     No Methicillin Resistant Staphlyococcus aureus (MRSA) isolated      No results for input(s): INFLUAPCR, INFLUBPCR, RSVPCR in the last 72 hours      Invalid input(s): LEGIONELLAURINARYANTIGEN        Code Status: Level 1 - Full Code

## 2018-04-01 LAB
ANION GAP SERPL CALCULATED.3IONS-SCNC: 11 MMOL/L (ref 4–13)
BUN SERPL-MCNC: 20 MG/DL (ref 5–25)
CALCIUM SERPL-MCNC: 8.5 MG/DL (ref 8.3–10.1)
CHLORIDE SERPL-SCNC: 104 MMOL/L (ref 100–108)
CO2 SERPL-SCNC: 23 MMOL/L (ref 21–32)
CREAT SERPL-MCNC: 1.01 MG/DL (ref 0.6–1.3)
ERYTHROCYTE [DISTWIDTH] IN BLOOD BY AUTOMATED COUNT: 14.4 % (ref 11.6–15.1)
GFR SERPL CREATININE-BSD FRML MDRD: 73 ML/MIN/1.73SQ M
GLUCOSE SERPL-MCNC: 147 MG/DL (ref 65–140)
HCT VFR BLD AUTO: 36.3 % (ref 42–52)
HGB BLD-MCNC: 12.3 G/DL (ref 14–18)
MAGNESIUM SERPL-MCNC: 2.1 MG/DL (ref 1.6–2.6)
MCH RBC QN AUTO: 32.8 PG (ref 27–31)
MCHC RBC AUTO-ENTMCNC: 33.7 G/DL (ref 31.4–37.4)
MCV RBC AUTO: 97 FL (ref 82–98)
PHOSPHATE SERPL-MCNC: 2.7 MG/DL (ref 2.3–4.1)
PLATELET # BLD AUTO: 160 THOUSANDS/UL (ref 130–400)
PMV BLD AUTO: 6.3 FL (ref 8.9–12.7)
POTASSIUM SERPL-SCNC: 3.9 MMOL/L (ref 3.5–5.3)
RBC # BLD AUTO: 3.74 MILLION/UL (ref 4.7–6.1)
SODIUM SERPL-SCNC: 138 MMOL/L (ref 136–145)
WBC # BLD AUTO: 8.1 THOUSAND/UL (ref 4.8–10.8)

## 2018-04-01 PROCEDURE — 99232 SBSQ HOSP IP/OBS MODERATE 35: CPT | Performed by: FAMILY MEDICINE

## 2018-04-01 PROCEDURE — 99231 SBSQ HOSP IP/OBS SF/LOW 25: CPT | Performed by: INTERNAL MEDICINE

## 2018-04-01 PROCEDURE — 80048 BASIC METABOLIC PNL TOTAL CA: CPT | Performed by: STUDENT IN AN ORGANIZED HEALTH CARE EDUCATION/TRAINING PROGRAM

## 2018-04-01 PROCEDURE — 94660 CPAP INITIATION&MGMT: CPT

## 2018-04-01 PROCEDURE — 83735 ASSAY OF MAGNESIUM: CPT | Performed by: STUDENT IN AN ORGANIZED HEALTH CARE EDUCATION/TRAINING PROGRAM

## 2018-04-01 PROCEDURE — 94640 AIRWAY INHALATION TREATMENT: CPT

## 2018-04-01 PROCEDURE — 94760 N-INVAS EAR/PLS OXIMETRY 1: CPT

## 2018-04-01 PROCEDURE — 84100 ASSAY OF PHOSPHORUS: CPT | Performed by: STUDENT IN AN ORGANIZED HEALTH CARE EDUCATION/TRAINING PROGRAM

## 2018-04-01 PROCEDURE — 85027 COMPLETE CBC AUTOMATED: CPT | Performed by: STUDENT IN AN ORGANIZED HEALTH CARE EDUCATION/TRAINING PROGRAM

## 2018-04-01 RX ADMIN — FLUTICASONE PROPIONATE 1 SPRAY: 50 SPRAY, METERED NASAL at 08:24

## 2018-04-01 RX ADMIN — APIXABAN 5 MG: 5 TABLET, FILM COATED ORAL at 08:24

## 2018-04-01 RX ADMIN — IPRATROPIUM BROMIDE AND ALBUTEROL SULFATE 3 ML: .5; 3 SOLUTION RESPIRATORY (INHALATION) at 20:03

## 2018-04-01 RX ADMIN — APIXABAN 5 MG: 5 TABLET, FILM COATED ORAL at 17:16

## 2018-04-01 RX ADMIN — IPRATROPIUM BROMIDE AND ALBUTEROL SULFATE 3 ML: .5; 3 SOLUTION RESPIRATORY (INHALATION) at 02:45

## 2018-04-01 RX ADMIN — PANTOPRAZOLE SODIUM 40 MG: 40 TABLET, DELAYED RELEASE ORAL at 05:50

## 2018-04-01 RX ADMIN — ZOLPIDEM TARTRATE 10 MG: 5 TABLET ORAL at 22:23

## 2018-04-01 RX ADMIN — LEVOFLOXACIN 750 MG: 5 INJECTION, SOLUTION INTRAVENOUS at 01:41

## 2018-04-01 RX ADMIN — METHYLPREDNISOLONE SODIUM SUCCINATE 20 MG: 40 INJECTION, POWDER, FOR SOLUTION INTRAMUSCULAR; INTRAVENOUS at 11:31

## 2018-04-01 RX ADMIN — AMLODIPINE BESYLATE 10 MG: 10 TABLET ORAL at 08:24

## 2018-04-01 RX ADMIN — IPRATROPIUM BROMIDE AND ALBUTEROL SULFATE 3 ML: .5; 3 SOLUTION RESPIRATORY (INHALATION) at 14:27

## 2018-04-01 RX ADMIN — IPRATROPIUM BROMIDE AND ALBUTEROL SULFATE 3 ML: .5; 3 SOLUTION RESPIRATORY (INHALATION) at 07:52

## 2018-04-01 RX ADMIN — TAMSULOSIN HYDROCHLORIDE 0.4 MG: 0.4 CAPSULE ORAL at 22:19

## 2018-04-01 RX ADMIN — METHYLPREDNISOLONE SODIUM SUCCINATE 20 MG: 40 INJECTION, POWDER, FOR SOLUTION INTRAMUSCULAR; INTRAVENOUS at 18:09

## 2018-04-01 RX ADMIN — METHYLPREDNISOLONE SODIUM SUCCINATE 20 MG: 40 INJECTION, POWDER, FOR SOLUTION INTRAMUSCULAR; INTRAVENOUS at 03:08

## 2018-04-01 NOTE — PROGRESS NOTES
Progress Note - Pulmonary   Reynold Choe 75 y o  male MRN: [id number]  Unit/Bed#: [address] [place] [address] Encounter: [id number]      Assessment/Plan:     1  Shortness of Breath / Platypnea :  As per pt his characteristic of shortness of breath have changed recently that he is more short of breath on sitting or standing and resolved with laying down  Most of his symptoms started after he had RSV infection in dec after which he recovered little bit but is again very symptomatic  However no signs of orthodeoxia   Pt had 2d echo which didn't show any signs of pulm htn     2  COPD exacerbation:  Agree with current steroid/bronchodilator for now     3  H/O pulmonary embolism:  Repeat CT shows complete resolution of clot     4  AAT Def:  Pt on replacement therapy weekly on tue        ______________________________________________________________________    Subjective: Pt seen and examined at bedside  Tele Events:     Vitals:   Temp:  [97 2 °F (36 2 °C)-97 9 °F (36 6 °C)] 97 9 °F (36 6 °C)  HR:  [72-80] 72  Resp:  [18-22] 18  BP: (145-163)/(73-79) 157/77  Weight (last 2 days)     None        Oxygen Therapy  SpO2: 97 %  O2 Flow Rate (L/min): 3 L/min    IV Infusions:       Nutrition:        Diet Orders            Start     Ordered    04/01/18 0729  Diet Regular; Regular House  Diet effective now     Question Answer Comment   Diet Type Regular    Regular Regular House    RD to adjust diet per protocol? Yes        04/01/18 0728 03/30/18 0955  Room Service  Once     Question:  Type of Service  Answer:  Room Service-Appropriate    03/30/18 0955          Ins/Outs:   I/O       03/30 0701 - 03/31 0700 03/31 0701 - 04/01 0700 04/01 0701 - 04/02 0700    P  O  840      Total Intake(mL/kg) 840 (10 3)      Urine (mL/kg/hr) 650 (0 3) 1020 (0 5)     Total Output 650 1020      Net +190 -1020                   Lines/Drains:  Invasive Devices     Peripheral Intravenous Line            Peripheral IV 03/30/18 Left Antecubital 1 day Active medications:  Scheduled Meds:  Current Facility-Administered Medications:  acetaminophen 650 mg Oral Q6H PRN Nell Sahu, DO    amLODIPine 10 mg Oral Daily Nell Sahu, DO    apixaban 5 mg Oral BID Nell Sahu, DO    fluticasone 1 spray Each Nare Daily Nell Sahu, DO    ipratropium-albuterol 3 mL Nebulization Q2H PRN Nell Sahu, DO    ipratropium-albuterol 3 mL Nebulization Q6H Nell Sahu, DO    levofloxacin 750 mg Intravenous Q24H Nell Sahu, DO Last Rate: 750 mg (04/01/18 0141)   meclizine 12 5 mg Oral Daily PRN Nell Sahu, DO    methylPREDNISolone sodium succinate 20 mg Intravenous Q8H Darold Kawasaki, MD    ondansetron 4 mg Intravenous Q6H PRN Nell Sahu, DO    pantoprazole 40 mg Oral Early Morning Nell Sahu, DO    tamsulosin 0 4 mg Oral HS Nell Sahu, DO    zolpidem 10 mg Oral HS PRN Nell Sahu, DO      PRN Meds:  acetaminophen 650 mg Q6H PRN   ipratropium-albuterol 3 mL Q2H PRN   meclizine 12 5 mg Daily PRN   ondansetron 4 mg Q6H PRN   zolpidem 10 mg HS PRN     ____________________________________________________________________      Physical Exam        ____________________________________________________________________    Labs:   CBC:   Results from last 7 days  Lab Units 04/01/18  0547 03/31/18  0525 03/30/18  0509   WBC Thousand/uL 8 10 7 00 4 10*   HEMOGLOBIN g/dL 12 3* 12 3* 12 6*   HEMATOCRIT % 36 3* 36 4* 37 5*   MCV fL 97 97 96   PLATELETS Thousands/uL 160 138 138     CMP:   Results from last 7 days  Lab Units 04/01/18  0547 03/31/18  0525 03/30/18  0509   SODIUM mmol/L 138 138 135*   POTASSIUM mmol/L 3 9 3 9 4 0   CHLORIDE mmol/L 104 104 102   CO2 mmol/L 23 24 22   BUN mg/dL 20 19 20   CREATININE mg/dL 1 01 1 03 1 21   GLUCOSE RANDOM mg/dL 147* 133 165*   CALCIUM mg/dL 8 5 9 1 8 8   EGFR ml/min/1 73sq m 73 71 59     Magnesium:   Results from last 7 days  Lab Units 04/01/18  0547   MAGNESIUM mg/dL 2 1     Phosphorous:   Results from last 7 days  Lab Units 04/01/18  0547   PHOSPHORUS mg/dL 2 7     Troponin:   Results from last 7 days  Lab Units 03/30/18  0042 03/29/18  1921   TROPONIN I ng/mL <0 02 <0 02     PT/INR:   Results from last 7 days  Lab Units 03/29/18  1921   PTT seconds 26   INR  1 08     Lactic Acid:     BNP:   Results from last 7 days  Lab Units 03/29/18  1921   NT-PRO BNP pg/mL 141*     TSH:     Procalcitonin: Invalid input(s): PROCALCITONIN          Micro: Lab Results   Component Value Date    BLOODCX No Growth After 5 Days  12/29/2017    BLOODCX No Growth After 5 Days  12/29/2017    MRSACULTURE  03/29/2018     No Methicillin Resistant Staphlyococcus aureus (MRSA) isolated      No results for input(s): INFLUAPCR, INFLUBPCR, RSVPCR in the last 72 hours      Invalid input(s): LEGIONELLAURINARYANTIGEN        Code Status: Level 1 - Full Code

## 2018-04-01 NOTE — PLAN OF CARE
DISCHARGE PLANNING     Discharge to home or other facility with appropriate resources Progressing        INFECTION - ADULT     Absence or prevention of progression during hospitalization Progressing        Knowledge Deficit     Patient/family/caregiver demonstrates understanding of disease process, treatment plan, medications, and discharge instructions Progressing        PAIN - ADULT     Verbalizes/displays adequate comfort level or baseline comfort level Progressing        Potential for Falls     Patient will remain free of falls Progressing        RESPIRATORY - ADULT     Achieves optimal ventilation and oxygenation Progressing        SAFETY ADULT     Patient will remain free of falls Progressing     Maintain or return to baseline ADL function Progressing     Maintain or return mobility status to optimal level Progressing

## 2018-04-01 NOTE — PROGRESS NOTES
[facility] Progress Note - Sunitha Strickland 75 y o  male MRN: [id number]    Unit/Bed#: [address] [place] [address] [id number] Encounter: [id number]      Assessment/Plan:  Acute on chronic SOB is likely 2/2 COPD exacerbation vs pneumonia vs bronchitis   -On home 3 L Nc, Monitor vitals, keep pulse oximetry greater than 92%   -Solu-Medrol  20 mg q 8h due to absence of wheezing  -Continue Levaquin 750 mg daily  -Duo nebs every 4 hours scheduled & every 2 hours p r n   -Continue telemetry  -Urine Legionella and strep pneumonia negative  -Procalcitonin ordered  -per pulm, will check for orthodeoxia  -echo showed EF 60-65%, mild concentric LV hypertrophy     H/O Recent PE   -2/14/18 acute PE in distal left main pulmonary artery  -Continue home Eliquis  -repeat CT shows resolution of clot     Neoplasm of unknown certainty of lung:  -PET-CT 2/12/18 right upper lung nodule noted  -stable     Alpha 1 antitrypsin deficiency  -Continue weekly Zemaira every Tuesday per outpt regimen     CLAYTON POA, resolved   -POA Cr 1 44, today 1 01  -Daily BMPs     HTN  -stable  -continue home amlodipine 10 mg daily     GERD  -Continue Protonix 40 mg daily     BPH  -Continue home Flomax 0 4mg daily     Insomnia  -Continue home zolpidem 10 mg q h s      H/O vertigo  -Continue home meclizine 12 5 mg p r n      FEN & DVT PPX  -Cardiac diet, replete electrolytes as needed  -Eliquis and SCDs      Subjective:   Pt seen and examined  Still SOB at baseline  On 3L which is home requirement  Coughing but denies CP/N/V/F/C  Objective:     Vitals: Blood pressure 145/73, pulse 74, temperature 97 9 °F (36 6 °C), temperature source Tympanic, resp  rate 18, height 6' 5" (1 956 m), weight 81 2 kg (179 lb), SpO2 94 %  ,Body mass index is 21 23 kg/m²    Wt Readings from Last 3 Encounters:   03/29/18 81 2 kg (179 lb)   03/29/18 80 7 kg (178 lb)   03/23/18 80 3 kg (177 lb)       Intake/Output Summary (Last 24 hours) at 04/01/18 0815  Last data filed at 04/01/18 0501   Gross per 24 hour   Intake                0 ml   Output             1020 ml   Net            -1020 ml       Physical Exam: General appearance: alert and oriented, in no acute distress  Lungs: clear to auscultation bilaterally  Heart: regular rate and rhythm, S1, S2 normal, no murmur, click, rub or gallop  Abdomen: soft, non-tender; bowel sounds normal; no masses,  no organomegaly  Extremities: extremities normal, warm and well-perfused; no cyanosis, clubbing, or edema     Recent Results (from the past 24 hour(s))   CBC and Platelet    Collection Time: 04/01/18  5:47 AM   Result Value Ref Range    WBC 8 10 4 80 - 10 80 Thousand/uL    RBC 3 74 (L) 4 70 - 6 10 Million/uL    Hemoglobin 12 3 (L) 14 0 - 18 0 g/dL    Hematocrit 36 3 (L) 42 0 - 52 0 %    MCV 97 82 - 98 fL    MCH 32 8 (H) 27 0 - 31 0 pg    MCHC 33 7 31 4 - 37 4 g/dL    RDW 14 4 11 6 - 15 1 %    Platelets 757 466 - 360 Thousands/uL    MPV 6 3 (L) 8 9 - 12 7 fL   Basic metabolic panel    Collection Time: 04/01/18  5:47 AM   Result Value Ref Range    Sodium 138 136 - 145 mmol/L    Potassium 3 9 3 5 - 5 3 mmol/L    Chloride 104 100 - 108 mmol/L    CO2 23 21 - 32 mmol/L    Anion Gap 11 4 - 13 mmol/L    BUN 20 5 - 25 mg/dL    Creatinine 1 01 0 60 - 1 30 mg/dL    Glucose 147 (H) 65 - 140 mg/dL    Calcium 8 5 8 3 - 10 1 mg/dL    eGFR 73 ml/min/1 73sq m   Magnesium    Collection Time: 04/01/18  5:47 AM   Result Value Ref Range    Magnesium 2 1 1 6 - 2 6 mg/dL   Phosphorus    Collection Time: 04/01/18  5:47 AM   Result Value Ref Range    Phosphorus 2 7 2 3 - 4 1 mg/dL       Current Facility-Administered Medications   Medication Dose Route Frequency Provider Last Rate Last Dose    acetaminophen (TYLENOL) tablet 650 mg  650 mg Oral Q6H PRN Nell Sahu DO        amLODIPine (NORVASC) tablet 10 mg  10 mg Oral Daily Nell Sahu DO   10 mg at 03/31/18 0828    apixaban (ELIQUIS) tablet 5 mg  5 mg Oral BID Nell Sahu DO   5 mg at 03/31/18 1721    fluticasone (FLONASE) 50 mcg/act nasal spray 1 spray  1 spray Each Nare Daily Nell Sahu, DO   1 spray at 03/31/18 0829    ipratropium-albuterol (DUO-NEB) 0 5-2 5 mg/3 mL inhalation solution 3 mL  3 mL Nebulization Q2H PRN Nell Sahu, DO   3 mL at 03/30/18 1020    ipratropium-albuterol (DUO-NEB) 0 5-2 5 mg/3 mL inhalation solution 3 mL  3 mL Nebulization Q6H Nelljoe Sahu, DO   3 mL at 04/01/18 0752    levofloxacin (LEVAQUIN) IVPB (premix) 750 mg  750 mg Intravenous Q24H Nell Sahu,  mL/hr at 04/01/18 0141 750 mg at 04/01/18 0141    meclizine (ANTIVERT) tablet 12 5 mg  12 5 mg Oral Daily PRN Nell Sahu, DO        methylPREDNISolone sodium succinate (Solu-MEDROL) injection 20 mg  20 mg Intravenous Q8H Osvaldo Hardin MD   20 mg at 04/01/18 0308    ondansetron (ZOFRAN) injection 4 mg  4 mg Intravenous Q6H PRN Nell Sahu, DO        pantoprazole (PROTONIX) EC tablet 40 mg  40 mg Oral Early Morning Nell Sahu, DO   40 mg at 04/01/18 0550    tamsulosin (FLOMAX) capsule 0 4 mg  0 4 mg Oral HS Nell Yaquelin, DO   0 4 mg at 03/31/18 2155    zolpidem (AMBIEN) tablet 10 mg  10 mg Oral HS PRN Nell Sahu, DO   10 mg at 03/31/18 2155       Invasive Devices     Peripheral Intravenous Line            Peripheral IV 03/30/18 Left Antecubital 1 day                Lab, Imaging and other studies: I have personally reviewed pertinent reports      VTE Pharmacologic Prophylaxis: Eliquis  VTE Mechanical Prophylaxis: sequential compression device    Gay Singer MD

## 2018-04-02 PROBLEM — R06.00 DYSPNEA: Status: ACTIVE | Noted: 2017-12-30

## 2018-04-02 PROBLEM — J43.2 CENTRILOBULAR EMPHYSEMA (HCC): Status: ACTIVE | Noted: 2018-04-02

## 2018-04-02 PROBLEM — J20.8 ACUTE BRONCHITIS DUE TO OTHER SPECIFIED ORGANISMS: Status: ACTIVE | Noted: 2018-04-02

## 2018-04-02 LAB
ANION GAP SERPL CALCULATED.3IONS-SCNC: 9 MMOL/L (ref 4–13)
BUN SERPL-MCNC: 24 MG/DL (ref 5–25)
CALCIUM SERPL-MCNC: 8.9 MG/DL (ref 8.3–10.1)
CHLORIDE SERPL-SCNC: 103 MMOL/L (ref 100–108)
CO2 SERPL-SCNC: 26 MMOL/L (ref 21–32)
CREAT SERPL-MCNC: 1.07 MG/DL (ref 0.6–1.3)
ERYTHROCYTE [DISTWIDTH] IN BLOOD BY AUTOMATED COUNT: 14 % (ref 11.6–15.1)
GFR SERPL CREATININE-BSD FRML MDRD: 68 ML/MIN/1.73SQ M
GLUCOSE SERPL-MCNC: 154 MG/DL (ref 65–140)
HCT VFR BLD AUTO: 37.9 % (ref 42–52)
HGB BLD-MCNC: 12.5 G/DL (ref 14–18)
MAGNESIUM SERPL-MCNC: 2 MG/DL (ref 1.6–2.6)
MCH RBC QN AUTO: 32.1 PG (ref 27–31)
MCHC RBC AUTO-ENTMCNC: 32.9 G/DL (ref 31.4–37.4)
MCV RBC AUTO: 98 FL (ref 82–98)
PHOSPHATE SERPL-MCNC: 3.1 MG/DL (ref 2.3–4.1)
PLATELET # BLD AUTO: 160 THOUSANDS/UL (ref 130–400)
PMV BLD AUTO: 6.5 FL (ref 8.9–12.7)
POTASSIUM SERPL-SCNC: 4 MMOL/L (ref 3.5–5.3)
RBC # BLD AUTO: 3.89 MILLION/UL (ref 4.7–6.1)
SODIUM SERPL-SCNC: 138 MMOL/L (ref 136–145)
WBC # BLD AUTO: 8.3 THOUSAND/UL (ref 4.8–10.8)

## 2018-04-02 PROCEDURE — 94640 AIRWAY INHALATION TREATMENT: CPT

## 2018-04-02 PROCEDURE — 80048 BASIC METABOLIC PNL TOTAL CA: CPT | Performed by: FAMILY MEDICINE

## 2018-04-02 PROCEDURE — 94660 CPAP INITIATION&MGMT: CPT

## 2018-04-02 PROCEDURE — 85027 COMPLETE CBC AUTOMATED: CPT | Performed by: FAMILY MEDICINE

## 2018-04-02 PROCEDURE — 99232 SBSQ HOSP IP/OBS MODERATE 35: CPT | Performed by: INTERNAL MEDICINE

## 2018-04-02 PROCEDURE — 94760 N-INVAS EAR/PLS OXIMETRY 1: CPT

## 2018-04-02 PROCEDURE — 84100 ASSAY OF PHOSPHORUS: CPT | Performed by: FAMILY MEDICINE

## 2018-04-02 PROCEDURE — 83735 ASSAY OF MAGNESIUM: CPT | Performed by: FAMILY MEDICINE

## 2018-04-02 RX ADMIN — TAMSULOSIN HYDROCHLORIDE 0.4 MG: 0.4 CAPSULE ORAL at 21:45

## 2018-04-02 RX ADMIN — PANTOPRAZOLE SODIUM 40 MG: 40 TABLET, DELAYED RELEASE ORAL at 05:10

## 2018-04-02 RX ADMIN — IPRATROPIUM BROMIDE AND ALBUTEROL SULFATE 3 ML: .5; 3 SOLUTION RESPIRATORY (INHALATION) at 08:41

## 2018-04-02 RX ADMIN — IPRATROPIUM BROMIDE AND ALBUTEROL SULFATE 3 ML: .5; 3 SOLUTION RESPIRATORY (INHALATION) at 01:32

## 2018-04-02 RX ADMIN — AMLODIPINE BESYLATE 10 MG: 10 TABLET ORAL at 08:53

## 2018-04-02 RX ADMIN — IPRATROPIUM BROMIDE AND ALBUTEROL SULFATE 3 ML: .5; 3 SOLUTION RESPIRATORY (INHALATION) at 19:54

## 2018-04-02 RX ADMIN — THEOPHYLLINE ANHYDROUS 400 MG: 400 CAPSULE, EXTENDED RELEASE ORAL at 12:36

## 2018-04-02 RX ADMIN — METHYLPREDNISOLONE SODIUM SUCCINATE 20 MG: 40 INJECTION, POWDER, FOR SOLUTION INTRAMUSCULAR; INTRAVENOUS at 10:55

## 2018-04-02 RX ADMIN — FLUTICASONE PROPIONATE 1 SPRAY: 50 SPRAY, METERED NASAL at 08:53

## 2018-04-02 RX ADMIN — METHYLPREDNISOLONE SODIUM SUCCINATE 20 MG: 40 INJECTION, POWDER, FOR SOLUTION INTRAMUSCULAR; INTRAVENOUS at 18:02

## 2018-04-02 RX ADMIN — APIXABAN 5 MG: 5 TABLET, FILM COATED ORAL at 18:02

## 2018-04-02 RX ADMIN — IPRATROPIUM BROMIDE AND ALBUTEROL SULFATE 3 ML: .5; 3 SOLUTION RESPIRATORY (INHALATION) at 13:59

## 2018-04-02 RX ADMIN — APIXABAN 5 MG: 5 TABLET, FILM COATED ORAL at 08:53

## 2018-04-02 RX ADMIN — ZOLPIDEM TARTRATE 10 MG: 5 TABLET ORAL at 21:00

## 2018-04-02 RX ADMIN — LEVOFLOXACIN 750 MG: 5 INJECTION, SOLUTION INTRAVENOUS at 01:46

## 2018-04-02 RX ADMIN — METHYLPREDNISOLONE SODIUM SUCCINATE 20 MG: 40 INJECTION, POWDER, FOR SOLUTION INTRAMUSCULAR; INTRAVENOUS at 02:34

## 2018-04-02 NOTE — PLAN OF CARE
Problem: RESPIRATORY - ADULT  Goal: Achieves optimal ventilation and oxygenation  INTERVENTIONS:  - Assess for changes in respiratory status  - Position to facilitate oxygenation and minimize respiratory effort  - Oxygen administration by appropriate delivery method based on oxygen saturation (per order) or ABGs  - Initiate smoking cessation education as indicated  - Assess and instruct to report SOB or any respiratory difficulty  - Respiratory Therapy support as indicated  Bipap prn   Outcome: Progressing  Pt is still showing some shortness of breath with crackles poss rales on left

## 2018-04-02 NOTE — PROGRESS NOTES
Progress Note - Pulmonary   Nguyen Carlin 75 y o  male MRN: [id number]  Unit/Bed#: [address] [place] [address] Encounter: [id number]    Assessment:  Persistent dyspnea  Patient with only marginal improvement with IV Solu-Medrol  Last spirometry done just prior to admission showed severe airflow obstruction for 1 6 L or 40% of predicted  Patient with decline in lung function since she had RSV infection earlier this year I think this is the major cause of his decompensation and decrease in lung volumes  Alpha-1 antitrypsin deficiency  Small lung nodules possibly inflammatory but could not exclude neoplasm  Echocardiogram shows normal LV systolic function and estimated pulmonary pressure was 35 mm Hg which is only minimally elevated  Chronic hypoxemic respiratory failure  Status post recent pulmonary emboli mid February  Most recent CTA of chest shows that these have resolved    Plan:  I spoke with patient's medical team   We discussed patient's ongoing dyspnea and any other medical intervention  We decided patient trial of theophylline 400 mg once a day  Continue methylprednisone 20 mg IV every 8 hours and if no change in dyspnea consider changing back to oral prednisone soon  One patient goes home I told him he would be best suited to used continuous oxygen 3 liters/minute with activity and at rest as this would be more effective for him than his battery operated pulse dose oxygen concentrator  Complete 5 day course of Levaquin    Subjective:   Still having dyspnea with any exertional activity  No chest pain  Some intermittent cough mostly nonproductive    Objective:     Vitals: Blood pressure 159/76, pulse 79, temperature 98 6 °F (37 °C), temperature source Oral, resp  rate 20, height 6' 5" (1 956 m), weight 81 2 kg (179 lb), SpO2 95 %  ,Body mass index is 21 23 kg/m²        Intake/Output Summary (Last 24 hours) at 04/02/18 1777  Last data filed at 04/02/18 0501   Gross per 24 hour   Intake                0 ml   Output 400 ml   Net             -400 ml       Physical Exam: Physical Exam   Constitutional: He is oriented to person, place, and time  Patient awake  No distress at rest   On 3 L of oxygen O2 saturation 97-98% at rest   When patient ambulated 100 feet lowest O2 saturation was 88-89%   HENT:   Head: Normocephalic  Nose: Nose normal    Mouth/Throat: Oropharynx is clear and moist    Eyes: Conjunctivae are normal    Neck: Neck supple  No JVD present  Cardiovascular: Normal rate, regular rhythm and normal heart sounds  Pulmonary/Chest: Effort normal    Lung sounds are diminished but clear   Abdominal: Soft  He exhibits no distension  There is no tenderness  There is no guarding  Musculoskeletal: He exhibits no edema  Lymphadenopathy:     He has no cervical adenopathy  Neurological: He is alert and oriented to person, place, and time  Skin: Skin is warm  No rash noted  No erythema  Psychiatric: He has a normal mood and affect  His behavior is normal  Thought content normal         Labs: I have personally reviewed pertinent lab results  , ABG: Lab Results   Component Value Date    PHART 7 429 03/29/2018    LEH8SYC 35 4 (L) 03/29/2018    PO2ART 196 7 (H) 03/29/2018    LRK2KKD 22 9 03/29/2018    BEART -0 9 03/29/2018    SOURCE Radial, Left 03/29/2018   , BNP: No results found for: BNP, CBC: Lab Results   Component Value Date    WBC 8 30 04/02/2018    HGB 12 5 (L) 04/02/2018    HCT 37 9 (L) 04/02/2018    MCV 98 04/02/2018     04/02/2018    MCH 32 1 (H) 04/02/2018    MCHC 32 9 04/02/2018    RDW 14 0 04/02/2018    MPV 6 5 (L) 04/02/2018    NRBC 0 03/31/2018   , CMP: Lab Results   Component Value Date     04/02/2018     09/27/2016    K 4 0 04/02/2018    K 4 2 09/27/2016     04/02/2018     09/27/2016    CO2 26 04/02/2018    CO2 23 09/27/2016    ANIONGAP 9 04/02/2018    BUN 24 04/02/2018    BUN 14 09/27/2016    CREATININE 1 07 04/02/2018    CREATININE 1 09 09/27/2016    GLUCOSE 154 (H) 04/02/2018    GLUCOSE 89 09/27/2016    CALCIUM 8 9 04/02/2018    CALCIUM 9 3 09/27/2016    AST 15 02/15/2018    AST 18 09/27/2016    ALT 32 02/15/2018    ALT 16 09/27/2016    ALKPHOS 45 (L) 02/15/2018    ALKPHOS 57 09/27/2016    PROT 6 5 02/15/2018    PROT 6 9 09/27/2016    BILITOT 1 00 02/15/2018    BILITOT 1 0 09/27/2016    EGFR 68 04/02/2018   , PT/INR:   Lab Results   Component Value Date    INR 1 08 03/29/2018   , Troponin: No results found for: TROPONIN    Imaging and other studies: I have personally reviewed pertinent reports     and I have personally reviewed pertinent films in PACS

## 2018-04-02 NOTE — PROGRESS NOTES
[facility] Practice Progress Note - Jordan Scott 75 y o  male MRN: [id number]    Unit/Bed#: [address] [place] [id number] Encounter: [id number]      Assessment/Plan:  Acute on chronic SOB is likely 2/2 COPD exacerbation vs progression of disease    -On home 3 L Nc, Monitor vitals, keep pulse oximetry greater than 92%   -Solu-Medrol  20 mg q 8h, will check with Dr Tierney Herrera about transitioning to PO   -Continue Levaquin 750 mg daily  -Duo nebs every 4 hours scheduled & every 2 hours p r n   -Continue telemetry  -Urine Legionella and strep pneumonia negative  -pulm recs appreciated  -echo showed EF 60-65%, mild concentric LV hypertrophy     H/O Recent PE   -2/14/18 acute PE in distal left main pulmonary artery  -Continue home Eliquis  -repeat CT shows resolution of clot     Neoplasm of unknown certainty of lung:  -PET-CT 2/12/18 right upper lung nodule noted  -stable     Alpha 1 antitrypsin deficiency  -Continue weekly Zemaira every Tuesday per outpt regimen     CLAYTON POA, resolved   -POA Cr 1 44, today 1 07  -Daily BMPs     HTN  -stable  -continue home amlodipine 10 mg daily     GERD  -Continue Protonix 40 mg daily     BPH  -Continue home Flomax 0 4mg daily     Insomnia  -Continue home zolpidem 10 mg q h s      H/O vertigo  -Continue home meclizine 12 5 mg p r n      FEN & DVT PPX  -Cardiac diet, replete electrolytes as needed  -Eliquis and SCDs      Subjective:   Patent seen and examined  Still SOB while standing or sitting down  On same O2 requirement as home  Denies CP/F/C/N/V  Objective:     Vitals: Blood pressure 164/86, pulse 69, temperature 98 5 °F (36 9 °C), temperature source Oral, resp  rate 20, height 6' 5" (1 956 m), weight 81 2 kg (179 lb), SpO2 97 %  ,Body mass index is 21 23 kg/m²    Wt Readings from Last 3 Encounters:   03/29/18 81 2 kg (179 lb)   03/29/18 80 7 kg (178 lb)   03/23/18 80 3 kg (177 lb)       Intake/Output Summary (Last 24 hours) at 04/02/18 1058  Last data filed at 04/02/18 0501   Gross per 24 hour   Intake                0 ml   Output              400 ml   Net             -400 ml       Physical Exam: General appearance: alert and oriented, in no acute distress  Lungs: clear to auscultation bilaterally  Heart: regular rate and rhythm, S1, S2 normal, no murmur, click, rub or gallop  Abdomen: soft, non-tender; bowel sounds normal; no masses,  no organomegaly  Extremities: extremities normal, warm and well-perfused; no cyanosis, clubbing, or edema     Recent Results (from the past 24 hour(s))   CBC    Collection Time: 04/02/18  2:10 AM   Result Value Ref Range    WBC 8 30 4 80 - 10 80 Thousand/uL    RBC 3 89 (L) 4 70 - 6 10 Million/uL    Hemoglobin 12 5 (L) 14 0 - 18 0 g/dL    Hematocrit 37 9 (L) 42 0 - 52 0 %    MCV 98 82 - 98 fL    MCH 32 1 (H) 27 0 - 31 0 pg    MCHC 32 9 31 4 - 37 4 g/dL    RDW 14 0 11 6 - 15 1 %    Platelets 509 059 - 318 Thousands/uL    MPV 6 5 (L) 8 9 - 12 7 fL   Basic metabolic panel    Collection Time: 04/02/18  2:10 AM   Result Value Ref Range    Sodium 138 136 - 145 mmol/L    Potassium 4 0 3 5 - 5 3 mmol/L    Chloride 103 100 - 108 mmol/L    CO2 26 21 - 32 mmol/L    Anion Gap 9 4 - 13 mmol/L    BUN 24 5 - 25 mg/dL    Creatinine 1 07 0 60 - 1 30 mg/dL    Glucose 154 (H) 65 - 140 mg/dL    Calcium 8 9 8 3 - 10 1 mg/dL    eGFR 68 ml/min/1 73sq m   Magnesium    Collection Time: 04/02/18  2:10 AM   Result Value Ref Range    Magnesium 2 0 1 6 - 2 6 mg/dL   Phosphorus    Collection Time: 04/02/18  2:10 AM   Result Value Ref Range    Phosphorus 3 1 2 3 - 4 1 mg/dL       Current Facility-Administered Medications   Medication Dose Route Frequency Provider Last Rate Last Dose    acetaminophen (TYLENOL) tablet 650 mg  650 mg Oral Q6H PRN Nell Sahu DO        amLODIPine (NORVASC) tablet 10 mg  10 mg Oral Daily Nell Sahu DO   10 mg at 04/02/18 0853    apixaban (ELIQUIS) tablet 5 mg  5 mg Oral BID Nell Sahu DO   5 mg at 04/02/18 0853    fluticasone (FLONASE) 50 mcg/act nasal spray 1 spray  1 spray Each Nare Daily Nell Sahu, DO   1 spray at 04/02/18 0853    ipratropium-albuterol (DUO-NEB) 0 5-2 5 mg/3 mL inhalation solution 3 mL  3 mL Nebulization Q2H PRN Nelljoe Sahu, DO   3 mL at 03/30/18 1020    ipratropium-albuterol (DUO-NEB) 0 5-2 5 mg/3 mL inhalation solution 3 mL  3 mL Nebulization Q6H Nell Yaquelin, DO   3 mL at 04/02/18 0841    levofloxacin (LEVAQUIN) IVPB (premix) 750 mg  750 mg Intravenous Q24H Nelljoe Sahu,  mL/hr at 04/02/18 0146 750 mg at 04/02/18 0146    meclizine (ANTIVERT) tablet 12 5 mg  12 5 mg Oral Daily PRN Nelljoe Sahu, DO        methylPREDNISolone sodium succinate (Solu-MEDROL) injection 20 mg  20 mg Intravenous Q8H Zachary Harrison MD   20 mg at 04/02/18 1055    ondansetron (ZOFRAN) injection 4 mg  4 mg Intravenous Q6H PRN Nelljoe Sahu, DO        pantoprazole (PROTONIX) EC tablet 40 mg  40 mg Oral Early Morning Nell Luis Alberto, DO   40 mg at 04/02/18 0510    tamsulosin (FLOMAX) capsule 0 4 mg  0 4 mg Oral HS [facility] Yaquelin, DO   0 4 mg at 04/01/18 2219    zolpidem (AMBIEN) tablet 10 mg  10 mg Oral HS PRN Nell Luis Alberto, DO   10 mg at 04/01/18 2223       Invasive Devices     Peripheral Intravenous Line            Peripheral IV 04/02/18 Left Forearm less than 1 day                Lab, Imaging and other studies: I have personally reviewed pertinent reports      VTE Pharmacologic Prophylaxis: Eliquis  VTE Mechanical Prophylaxis: sequential compression device    Sonny Valentine MD

## 2018-04-02 NOTE — PROGRESS NOTES
The levofloxacin has / have been converted to Oral per [facility] IV-to-PO Auto-Conversion Protocol for Adults as approved by the Pharmacy and Therapeutics Committee  The patient met all eligible criteria:  3 Age = 25years old   2) Received at least one dose of the IV form   3) Receiving at least one other scheduled oral/enteral medication   4) Tolerating an oral/enteral diet   and did not have any exclusions:   1) Critical care patient   2) Active GI bleed (IF assessing H2RAs or PPIs)   3) Continuous tube feeding (IF assessing cipro, doxycycline, levofloxacin, minocycline, rifampin, or voriconazole)   4) Receiving PO vancomycin (IF assessing metronidazole)   5) Persistent nausea and/or vomiting   6) Ileus or gastrointestinal obstruction   7) Dannie/nasogastric tube set for continuous suction   8) Specific order not to automatically convert to PO (in the order's comments or if discussed in the most recent Infectious Disease or primary team's progress notes)

## 2018-04-03 LAB
ANION GAP SERPL CALCULATED.3IONS-SCNC: 9 MMOL/L (ref 4–13)
BASOPHILS # BLD AUTO: 0 THOUSANDS/ΜL (ref 0–0.1)
BASOPHILS NFR BLD AUTO: 0 % (ref 0–1)
BUN SERPL-MCNC: 27 MG/DL (ref 5–25)
CALCIUM SERPL-MCNC: 8.9 MG/DL (ref 8.3–10.1)
CHLORIDE SERPL-SCNC: 102 MMOL/L (ref 100–108)
CO2 SERPL-SCNC: 26 MMOL/L (ref 21–32)
CREAT SERPL-MCNC: 1.05 MG/DL (ref 0.6–1.3)
EOSINOPHIL # BLD AUTO: 0 THOUSAND/ΜL (ref 0–0.61)
EOSINOPHIL NFR BLD AUTO: 0 % (ref 0–6)
ERYTHROCYTE [DISTWIDTH] IN BLOOD BY AUTOMATED COUNT: 14.4 % (ref 11.6–15.1)
GFR SERPL CREATININE-BSD FRML MDRD: 70 ML/MIN/1.73SQ M
GLUCOSE SERPL-MCNC: 142 MG/DL (ref 65–140)
HCT VFR BLD AUTO: 37.2 % (ref 42–52)
HGB BLD-MCNC: 12.3 G/DL (ref 14–18)
LYMPHOCYTES # BLD AUTO: 0.6 THOUSANDS/ΜL (ref 0.6–4.47)
LYMPHOCYTES NFR BLD AUTO: 7 % (ref 14–44)
MAGNESIUM SERPL-MCNC: 2 MG/DL (ref 1.6–2.6)
MCH RBC QN AUTO: 32.4 PG (ref 27–31)
MCHC RBC AUTO-ENTMCNC: 33.1 G/DL (ref 31.4–37.4)
MCV RBC AUTO: 98 FL (ref 82–98)
MONOCYTES # BLD AUTO: 0.4 THOUSAND/ΜL (ref 0.17–1.22)
MONOCYTES NFR BLD AUTO: 4 % (ref 4–12)
NEUTROPHILS # BLD AUTO: 7.9 THOUSANDS/ΜL (ref 1.85–7.62)
NEUTS SEG NFR BLD AUTO: 89 % (ref 43–75)
NRBC BLD AUTO-RTO: 0 /100 WBCS
PHOSPHATE SERPL-MCNC: 3.3 MG/DL (ref 2.3–4.1)
PLATELET # BLD AUTO: 162 THOUSANDS/UL (ref 130–400)
PLATELET BLD QL SMEAR: ADEQUATE
PMV BLD AUTO: 6.7 FL (ref 8.9–12.7)
POTASSIUM SERPL-SCNC: 4.2 MMOL/L (ref 3.5–5.3)
RBC # BLD AUTO: 3.8 MILLION/UL (ref 4.7–6.1)
SODIUM SERPL-SCNC: 137 MMOL/L (ref 136–145)
WBC # BLD AUTO: 8.9 THOUSAND/UL (ref 4.8–10.8)

## 2018-04-03 PROCEDURE — G8978 MOBILITY CURRENT STATUS: HCPCS

## 2018-04-03 PROCEDURE — 97167 OT EVAL HIGH COMPLEX 60 MIN: CPT

## 2018-04-03 PROCEDURE — 85025 COMPLETE CBC W/AUTO DIFF WBC: CPT | Performed by: STUDENT IN AN ORGANIZED HEALTH CARE EDUCATION/TRAINING PROGRAM

## 2018-04-03 PROCEDURE — 97162 PT EVAL MOD COMPLEX 30 MIN: CPT

## 2018-04-03 PROCEDURE — 94640 AIRWAY INHALATION TREATMENT: CPT

## 2018-04-03 PROCEDURE — 94660 CPAP INITIATION&MGMT: CPT

## 2018-04-03 PROCEDURE — 94760 N-INVAS EAR/PLS OXIMETRY 1: CPT

## 2018-04-03 PROCEDURE — 84100 ASSAY OF PHOSPHORUS: CPT | Performed by: STUDENT IN AN ORGANIZED HEALTH CARE EDUCATION/TRAINING PROGRAM

## 2018-04-03 PROCEDURE — G8979 MOBILITY GOAL STATUS: HCPCS

## 2018-04-03 PROCEDURE — 99232 SBSQ HOSP IP/OBS MODERATE 35: CPT | Performed by: INTERNAL MEDICINE

## 2018-04-03 PROCEDURE — 80048 BASIC METABOLIC PNL TOTAL CA: CPT | Performed by: STUDENT IN AN ORGANIZED HEALTH CARE EDUCATION/TRAINING PROGRAM

## 2018-04-03 PROCEDURE — 83735 ASSAY OF MAGNESIUM: CPT | Performed by: STUDENT IN AN ORGANIZED HEALTH CARE EDUCATION/TRAINING PROGRAM

## 2018-04-03 PROCEDURE — G8987 SELF CARE CURRENT STATUS: HCPCS

## 2018-04-03 PROCEDURE — G8988 SELF CARE GOAL STATUS: HCPCS

## 2018-04-03 RX ADMIN — PANTOPRAZOLE SODIUM 40 MG: 40 TABLET, DELAYED RELEASE ORAL at 05:29

## 2018-04-03 RX ADMIN — METHYLPREDNISOLONE SODIUM SUCCINATE 20 MG: 40 INJECTION, POWDER, FOR SOLUTION INTRAMUSCULAR; INTRAVENOUS at 03:08

## 2018-04-03 RX ADMIN — FLUTICASONE PROPIONATE 1 SPRAY: 50 SPRAY, METERED NASAL at 08:25

## 2018-04-03 RX ADMIN — IPRATROPIUM BROMIDE AND ALBUTEROL SULFATE 3 ML: .5; 3 SOLUTION RESPIRATORY (INHALATION) at 07:35

## 2018-04-03 RX ADMIN — IPRATROPIUM BROMIDE AND ALBUTEROL SULFATE 3 ML: .5; 3 SOLUTION RESPIRATORY (INHALATION) at 13:26

## 2018-04-03 RX ADMIN — LEVOFLOXACIN 750 MG: 500 TABLET, FILM COATED ORAL at 02:00

## 2018-04-03 RX ADMIN — TAMSULOSIN HYDROCHLORIDE 0.4 MG: 0.4 CAPSULE ORAL at 21:14

## 2018-04-03 RX ADMIN — APIXABAN 5 MG: 5 TABLET, FILM COATED ORAL at 18:01

## 2018-04-03 RX ADMIN — IPRATROPIUM BROMIDE AND ALBUTEROL SULFATE 3 ML: .5; 3 SOLUTION RESPIRATORY (INHALATION) at 20:39

## 2018-04-03 RX ADMIN — AMLODIPINE BESYLATE 10 MG: 10 TABLET ORAL at 08:25

## 2018-04-03 RX ADMIN — ZOLPIDEM TARTRATE 10 MG: 5 TABLET ORAL at 21:45

## 2018-04-03 RX ADMIN — METHYLPREDNISOLONE SODIUM SUCCINATE 20 MG: 40 INJECTION, POWDER, FOR SOLUTION INTRAMUSCULAR; INTRAVENOUS at 18:01

## 2018-04-03 RX ADMIN — APIXABAN 5 MG: 5 TABLET, FILM COATED ORAL at 08:25

## 2018-04-03 RX ADMIN — THEOPHYLLINE ANHYDROUS 400 MG: 400 CAPSULE, EXTENDED RELEASE ORAL at 08:25

## 2018-04-03 RX ADMIN — METHYLPREDNISOLONE SODIUM SUCCINATE 20 MG: 40 INJECTION, POWDER, FOR SOLUTION INTRAMUSCULAR; INTRAVENOUS at 10:50

## 2018-04-03 RX ADMIN — IPRATROPIUM BROMIDE AND ALBUTEROL SULFATE 3 ML: .5; 3 SOLUTION RESPIRATORY (INHALATION) at 01:19

## 2018-04-03 NOTE — ASSESSMENT & PLAN NOTE
Peg Mcbride has cough productive for white and yellow mucous  His cough is worse over the past few day No fever or chills      I will prescribe Azithromycin 5 day pack

## 2018-04-03 NOTE — CASE MANAGEMENT
Continued Stay Review    Date: 4/3/18    Vital Signs: /78 (BP Location: Right arm)   Pulse 78   Temp 97 9 °F (36 6 °C) (Oral)   Resp 20   Ht 6' 5" (1 956 m)   Wt 81 2 kg (179 lb)   SpO2 96%   BMI 21 23 kg/m²     Medications:   Scheduled Meds:   Current Facility-Administered Medications:  acetaminophen 650 mg Oral Q6H PRN Nell Sahu, DO   amLODIPine 10 mg Oral Daily Nell Sahu, DO   apixaban 5 mg Oral BID Nell Sahu, DO   fluticasone 1 spray Each Nare Daily Nell Sahu, DO   ipratropium-albuterol 3 mL Nebulization Q2H PRN Nell Sahu, DO   ipratropium-albuterol 3 mL Nebulization Q6H Nell Sahu, DO   levofloxacin 750 mg Oral Q24H Nell Sahu, DO   meclizine 12 5 mg Oral Daily PRN Nell Sahu, DO   methylPREDNISolone sodium succinate 20 mg Intravenous Q8H Gurpreet Jones MD   ondansetron 4 mg Intravenous Q6H PRN Nell Sahu, DO   pantoprazole 40 mg Oral Early Morning Nell Sahu, DO   tamsulosin 0 4 mg Oral HS Nell Sahu, DO   theophylline 400 mg Oral Daily Crissy Levy MD   zolpidem 10 mg Oral HS PRN Nell Sahu, DO     Continuous Infusions:    PRN Meds:   acetaminophen    ipratropium-albuterol    meclizine    ondansetron    zolpidem    Abnormal Labs/Diagnostic Results:   HGB 12 3 BUN 27 GLUC 142     Age/Sex: 75 y o  male     Assessment/Plan:   Acute on chronic SOB is likely 2/2 progressive decline of lung function vs COPD exacerbation    -On home 3 L Nc, Monitor vitals, keep pulse oximetry greater than 92%   -theophylline 400mg daily (day 2)   -Solu-Medrol  20 mg q 8h, transition to prednisone PO today  -Continue Levaquin 750 po mg daily  -Duo nebs every 4 hours scheduled & every 2 hours p r n   -Continue telemetry  -Urine Legionella and strep pneumonia negative  -pulm recs appreciated  -echo showed EF 60-65%, mild concentric LV hypertrophy     H/O Recent PE   -2/14/18 acute PE in distal left main pulmonary artery  -Continue home Eliquis  -repeat CT shows resolution of clot     Neoplasm of unknown certainty of lung:  -PET-CT 2/12/18 right upper lung nodule noted  -stable     Alpha 1 antitrypsin deficiency  -Continue weekly Zemaira every Tuesday per outpt regimen     CLAYTON POA, resolved   -POA Cr 1 44, today 1 05  -Daily BMPs     HTN  -stable  -continue home amlodipine 10 mg daily     GERD  -Continue Protonix 40 mg daily     BPH  -Continue home Flomax 0 4mg daily     Insomnia  -Continue home zolpidem 10 mg q h s      H/O vertigo  -Continue home meclizine 12 5 mg p r n      FEN & DVT PPX  -Cardiac diet, replete electrolytes as needed  -Eliquis and SCDs        Subjective:   Patient seen and examined  Still SOB at baseline, on 3L Nc  Denies CP/F/C/N/V       Discharge Plan: TBD

## 2018-04-03 NOTE — ASSESSMENT & PLAN NOTE
Centrilobular emphysema  Spirometry shows FEV1 decreased to 1 60 L or 40% of predicted  His FEV1 has declined and I suspect this is related to RSV infection in [facility] and PE in February InOn mArch 12 the FEV1 was 1 95 L or 48% of predicted  No wheezing and he completed a course of prednsione so I don't think he needs additional prednisone at this time  He is involvedin pulmonary rehab now

## 2018-04-03 NOTE — ASSESSMENT & PLAN NOTE
He has 2 small lung nodules which are being followed  These may be inflammatory but neoplasm is a possibilty  Follow up CT of chest will be done at later time

## 2018-04-03 NOTE — PROGRESS NOTES
[facility] Progress Note - Georg Primrose 75 y o  male MRN: [id number]    Unit/Bed#: [address] [place] [address] Encounter: [id number]      Assessment/Plan:  Acute on chronic SOB is likely 2/2 progressive decline of lung function vs COPD exacerbation    -On home 3 L Nc, Monitor vitals, keep pulse oximetry greater than 92%   -theophylline 400mg daily (day 2)   -Solu-Medrol  20 mg q 8h, transition to prednisone PO today  -Continue Levaquin 750 po mg daily  -Duo nebs every 4 hours scheduled & every 2 hours p r n   -Continue telemetry  -Urine Legionella and strep pneumonia negative  -pulm recs appreciated  -echo showed EF 60-65%, mild concentric LV hypertrophy     H/O Recent PE   -2/14/18 acute PE in distal left main pulmonary artery  -Continue home Eliquis  -repeat CT shows resolution of clot     Neoplasm of unknown certainty of lung:  -PET-CT 2/12/18 right upper lung nodule noted  -stable     Alpha 1 antitrypsin deficiency  -Continue weekly Zemaira every Tuesday per outpt regimen     CLAYTON POA, resolved   -POA Cr 1 44, today 1 05  -Daily BMPs     HTN  -stable  -continue home amlodipine 10 mg daily     GERD  -Continue Protonix 40 mg daily     BPH  -Continue home Flomax 0 4mg daily     Insomnia  -Continue home zolpidem 10 mg q h s      H/O vertigo  -Continue home meclizine 12 5 mg p r n      FEN & DVT PPX  -Cardiac diet, replete electrolytes as needed  -Eliquis and SCDs      Subjective:   Patient seen and examined  Still SOB at baseline, on 3L Nc  Denies CP/F/C/N/V  Objective:     Vitals: Blood pressure 138/65, pulse 78, temperature 98 2 °F (36 8 °C), temperature source Tympanic, resp  rate 20, height 6' 5" (1 956 m), weight 81 2 kg (179 lb), SpO2 95 %  ,Body mass index is 21 23 kg/m²    Wt Readings from Last 3 Encounters:   03/29/18 81 2 kg (179 lb)   03/29/18 80 7 kg (178 lb)   03/23/18 80 3 kg (177 lb)       Intake/Output Summary (Last 24 hours) at 04/03/18 9433  Last data filed at 04/02/18 2027   Gross per 24 hour Intake              480 ml   Output              300 ml   Net              180 ml       Physical Exam: General appearance: alert and oriented, in no acute distress  Lungs: clear to auscultation bilaterally  Heart: regular rate and rhythm, S1, S2 normal, no murmur, click, rub or gallop  Abdomen: soft, non-tender; bowel sounds normal; no masses,  no organomegaly  Extremities: extremities normal, warm and well-perfused; no cyanosis, clubbing, or edema   Lungs addendum: Increased WOB while sitting up    Recent Results (from the past 24 hour(s))   Magnesium    Collection Time: 04/03/18  5:23 AM   Result Value Ref Range    Magnesium 2 0 1 6 - 2 6 mg/dL   Phosphorus    Collection Time: 04/03/18  5:23 AM   Result Value Ref Range    Phosphorus 3 3 2 3 - 4 1 mg/dL   Basic metabolic panel    Collection Time: 04/03/18  5:23 AM   Result Value Ref Range    Sodium 137 136 - 145 mmol/L    Potassium 4 2 3 5 - 5 3 mmol/L    Chloride 102 100 - 108 mmol/L    CO2 26 21 - 32 mmol/L    Anion Gap 9 4 - 13 mmol/L    BUN 27 (H) 5 - 25 mg/dL    Creatinine 1 05 0 60 - 1 30 mg/dL    Glucose 142 (H) 65 - 140 mg/dL    Calcium 8 9 8 3 - 10 1 mg/dL    eGFR 70 ml/min/1 73sq m       Current Facility-Administered Medications   Medication Dose Route Frequency Provider Last Rate Last Dose    acetaminophen (TYLENOL) tablet 650 mg  650 mg Oral Q6H PRN Nell Sahu DO        amLODIPine (NORVASC) tablet 10 mg  10 mg Oral Daily Nell Sahu DO   10 mg at 04/02/18 0853    apixaban (ELIQUIS) tablet 5 mg  5 mg Oral BID Nell Sahu DO   5 mg at 04/02/18 1802    fluticasone (FLONASE) 50 mcg/act nasal spray 1 spray  1 spray Each Nare Daily Nell Sahu DO   1 spray at 04/02/18 0853    ipratropium-albuterol (DUO-NEB) 0 5-2 5 mg/3 mL inhalation solution 3 mL  3 mL Nebulization Q2H PRN Nell Sahu DO   3 mL at 03/30/18 1020    ipratropium-albuterol (DUO-NEB) 0 5-2 5 mg/3 mL inhalation solution 3 mL  3 mL Nebulization Q6H Nellkia Jamisonh, DO   3 mL at 04/03/18 0119    levofloxacin (LEVAQUIN) tablet 750 mg  750 mg Oral Q24H Nell Sahu, DO   750 mg at 04/03/18 0200    meclizine (ANTIVERT) tablet 12 5 mg  12 5 mg Oral Daily PRN Nell Jamisonh, DO        methylPREDNISolone sodium succinate (Solu-MEDROL) injection 20 mg  20 mg Intravenous Q8H Anay Blakely MD   20 mg at 04/03/18 0308    ondansetron (ZOFRAN) injection 4 mg  4 mg Intravenous Q6H PRN Nell Sahu, DO        pantoprazole (PROTONIX) EC tablet 40 mg  40 mg Oral Early Morning Nell Sahu, DO   40 mg at 04/03/18 0529    tamsulosin (FLOMAX) capsule 0 4 mg  0 4 mg Oral HS Nelljoe Sahu, DO   0 4 mg at 04/02/18 2145    theophylline (ESTEFANIA-24) 24 hr capsule 400 mg  400 mg Oral Daily Irma Jain MD   400 mg at 04/02/18 1236    zolpidem (AMBIEN) tablet 10 mg  10 mg Oral HS PRN Nell Sahu, DO   10 mg at 04/02/18 2100       Invasive Devices     Peripheral Intravenous Line            Peripheral IV 04/02/18 Left Forearm 1 day                Lab, Imaging and other studies: I have personally reviewed pertinent reports      VTE Pharmacologic Prophylaxis: Eliquis  VTE Mechanical Prophylaxis: sequential compression device    Irma Jain MD

## 2018-04-03 NOTE — PHYSICAL THERAPY NOTE
PT EVALUATION       04/03/18 1111   Pain Assessment   Pain Assessment No/denies pain   Home Living   Type of [address] One level  (2 TEMI)   Home Equipment Cane;Walker   Prior Function   Level of [place] Independent with ADLs and functional mobility  (SOB and weak  for 3 months; multiple hospital admits)   Lives With Alone   Comments ambulatory with 3L02 without cane or walker   Restrictions/Precautions   Other Precautions O2  (SOB at rest)   General   Additional Pertinent History Pt with history of COPD exacerbation and PE over past few months with progressive SOB and weakness   Cognition   Overall Cognitive Status WFL   RLE Assessment   RLE Assessment (ROM WFL, MMT hip 3+/5, knee 4/5, ankle 4/5)   LLE Assessment   LLE Assessment (ROM WFL, MMT hip 3+/5, knee 4/5, ankle 4/5)   Bed Mobility   Supine to Sit 5  Supervision   Additional items Increased time required   Sit to Supine 5  Supervision   Additional items Increased time required   Transfers   Sit to Stand 5  Supervision   Additional items Increased time required   Stand to Sit 5  Supervision   Additional items Increased time required   Ambulation/Elevation   Gait Assistance 4  Minimal assist   Assistive Device (3L02)   Distance few steps toward HOB, limited by SOB   Balance   Static Sitting Good   Dynamic Sitting Fair   Static Standing Fair   Dynamic Standing Fair   Endurance Deficit   Endurance Deficit (SP02 96% before and after activty;  +dyspnea)   Activity Tolerance   Activity Tolerance Patient limited by fatigue   Assessment   Prognosis Good   Problem List Decreased strength;Decreased endurance; Impaired balance;Decreased mobility   Assessment Patient seen for Physical Therapy evaluation  Patient admitted with COPD exacerbation ([facility] [address] )  Comorbidities affecting patient's physical performance include: COPD, emphysema, PE, lung mass, htn    Personal factors affecting patient at time of initial evaluation include: stairs to enter home and inability to ambulate household distances  Prior to admission, patient was independent with functional mobility without assistive device  Please find objective findings from Physical Therapy assessment regarding body systems outlined above with impairments and limitations including weakness, impaired balance, decreased endurance, decreased activity tolerance, decreased functional mobility tolerance, fall risk and SOB upon exertion  The Barthel Index was used as a functional outcome tool presenting with a score of 55 today indicating marked limitations of functional mobility and ADLS  Patient's clinical presentation is currently evolving as seen in patient's presentation of new onset of impairment of functional mobility, decreased endurance and new onset of weakness  Pt would benefit from continued Physical Therapy treatment to address deficits as defined above and maximize level of functional mobility  As demonstrated by objective findings, the assigned level of complexity for this evaluation is moderate  Goals   Patient Goals "less SOB, get stronger"   STG Expiration Date (1-7 days)   Short Term Goal #1 independent bed mobility, independent transfers bed to chair, pt will sit OOB in chair for 2 hrs, pt will ambulate 50 feet with with min assist and minimal SOB   LTG Expiration Date (1-2 weeks)   Long Term Goal #1 independent ambulation with/without device 100 feet with minimal SOB, independent up and down 2 steps without SOB   Plan   Treatment/Interventions ADL retraining;Functional transfer training;LE strengthening/ROM; Elevations; Therapeutic exercise; Endurance training;Patient/family training;Equipment eval/education;Gait training   PT Frequency (5x/week)   Recommendation   Recommendation (STR)   Equipment Recommended (pt has 02, walker, cane)   Barthel Index   Feeding 10   Bathing 0   Grooming Score 5   Dressing Score 5   Bladder Score 10   Bowels Score 10   Toilet Use Score 5   Transfers (Bed/Chair) Score 10   Mobility (Level Surface) Score 0   Stairs Score 0   Barthel Index Score 55   Licensure   [place] License Number  [id number]   Leonid Natarajan PT  [id number]

## 2018-04-03 NOTE — ASSESSMENT & PLAN NOTE
Status post pulmonry embolism in February 2018 for which he will continue Eliquis  He is tolerating this well

## 2018-04-03 NOTE — OCCUPATIONAL THERAPY NOTE
OT EVALUATION       04/03/18 1500   Restrictions/Precautions   Other Precautions O2;Fall Risk   Pain Assessment   Pain Assessment No/denies pain   Home Living   Type of [address] One level  (2 TEMI)   Home Equipment Walker;Cane  (O2)   Prior Function   Level of [place] Independent with ADLs and functional mobility; Needs assistance with IADLs  (has been SOB and limited since december )   Lives With Alone   ADL Assistance Independent   IADLs Needs assistance   Comments pt enjoys going to the club house and playing pool, pt reports not able to do that since decemeber due to SOB   ADL   Eating Assistance 7  Independent   Grooming Assistance 7  Independent   UB Bathing Assistance 7  Independent    [facility]  4  Minimal Assistance   Bed Mobility   Supine to [address] to Supine 5  Supervision   Transfers   Sit to Stand 5  Supervision   [facility] 4  Minimal assistance   Additional items Standard toilet   Functional Mobility   Functional Mobility 4  Minimal assistance   Additional Comments 10 feet x 2 to and from bathroom    Balance   Static Sitting Good   Dynamic Sitting Fair   Static Standing Fair   Dynamic Standing Fair   Activity Tolerance   Activity Tolerance Patient limited by fatigue   RUE Assessment   RUE Assessment WFL  (4/5)   LUE Assessment   LUE Assessment WFL  (4/5)   Hand Function   Gross Motor Coordination Functional   Fine Motor Coordination Functional   Cognition   Overall Cognitive Status WFL   Arousal/Participation Cooperative   Attention Within functional limits   Orientation Level Oriented X4   Following Commands Follows all commands and directions without difficulty   Assessment   Limitation Decreased ADL status; Decreased UE strength;Decreased Safe judgement during ADL;Decreased endurance;Decreased self-care trans;Decreased high-level ADLs  (decreased balance and mobility )   Prognosis Good   Assessment Patient evaluated by Occupational Therapy  Patient admitted with COPD exacerbation ([facility] [address] )  The patients occupational profile, medical and therapy history includes a extensive additional review of physical, cognitive, or psychosocial history related to current functional performance  Comorbidities affecting functional mobility and ADLS include: pulmonary emphysema, arthritis, COPD and hypertension  Prior to admission, patient was independent with functional mobility with cane/walker with O2, daughter does grocery shopping, pt has been mostly only in the house due to SOB over 3 months, independent with ADLS and requiring assist for IADLS  The evaluation identifies the following performance deficits: weakness, decreased ROM, impaired balance, decreased endurance, increased fall risk, new onset of impairment of functional mobility, decreased ADLS, decreased IADLS, decreased activity tolerance, decreased safety awareness, SOB upon exertion and decreased strength, that result in activity limitations and/or participation restrictions  This evaluation requires clinical decision making of high complexity, because the patient presents with comorbidites that affect occupational performance and required significant modification of tasks or assistance with consideration of multiple treatment options  The Barthel Index was used as a functional outcome tool presenting with a score of 55, indicating marked limitations of functional mobility and ADLS  Patient will benefit from skilled Occupational Therapy services to address above deficits and facilitate a safe return to prior level of function  Goals   Patient Goals go home, be independent, go to play pool   STG Time Frame (1-7 days)   Short Term Goal  Patient will increase standing tolerance to 4 minutes during functional activity;  Patient will increase bed mobility to independent; Patient will increase functional mobility to and from bathroom with assistive device with supervision to increase performance with ADLS; Patient will tolerate 8 minutes of UE ROM/strengthening to increase general activity tolerance and performance in ADLS/IADLS; Patient will improve functional activity tolerance to 10 minutes of sustained functional tasks to increase participation in basic self-care and decrease assistance level  LTG Time Frame (8-14 days)   Long Term Goal Patient will increase standing tolerance to 8 minutes during functional activity; Patient will increase functional mobility to and from bathroom with assistive device independently to increase performance with ADLS; Patient will tolerate 12 minutes of UE ROM/strengthening to increase general activity tolerance and performance in ADLS/IADLS; Patient will improve functional activity tolerance to 20 minutes of sustained functional tasks to increase participation in basic self-care and decrease assistance level  Functional Transfer Goals   Pt Will Perform All Functional Transfers (STG supervision LTG independent )   ADL Goals   Pt Will Perform Bathing (STG supervision LTG Independent )   Pt Will Perform LE Dressing (STG supervision LTG Independent )   Pt Will Perform Toileting (STG supervision LTG Independent )   Plan   Treatment Interventions ADL retraining;Functional transfer training;UE strengthening/ROM; Endurance training;Patient/family training;Equipment evaluation/education; Activityengagement; Energy conservation   OT Frequency 3-5x/wk   Recommendation   OT Discharge Recommendation Short Term Rehab   Barthel Index   Feeding 10   Bathing 0   Grooming Score 5   Dressing Score 5   Bladder Score 10   Bowels Score 10   Toilet Use Score 5   Transfers (Bed/Chair) Score 10   Mobility (Level Surface) Score 0   Stairs Score 0   Barthel Index Score 55   Shar Roche MS OTR/L [id number]

## 2018-04-03 NOTE — PROGRESS NOTES
Progress Note - Pulmonary   Nguyen Carlin 75 y o  male MRN: [id number]  Unit/Bed#: [address] [place] [address] Encounter: [id number]    Assessment:  Dyspnea secondary to severe COPD with some acute exacerbation  This exacerbation started in January following RSV infection and patient has had some decline in his FEV1 since then  FEV1 is 1 6 L or 40% of predicted  Has had only marginal improvement with IV Solu-Medrol  Alpha-1 antitrypsin deficiency  Chronic hypoxemic respiratory failure  Status post recent pulmonary emboli mid February  Most recent CT of chest shows resolution of these  Small lung nodules possibly inflammatory but could not exclude neoplasm    Plan:  Patient is on Levaquin 750 mg per day can complete a 5 day course  Can change methylprednisone to prednisone tomorrow and could consider 40 mg per day with 10 mg taper every 4th day  Continue theophylline extended release 400 mg once a day  He is tolerating the dose without any apparent side effects in order theophylline level in a m  to make sure he is not a slow metabolizer  Oxygen 3 liters/minute  Continue Eliquis  I will follow with patient at later time regarding this small lung nodules  Continue nebulizer treatments with DuoNeb every 6 hours      Subjective:   Still has shortness of breath with minimal activity  Has some intermittent cough    Objective:     Vitals: Blood pressure 162/68, pulse 88, temperature (!) 97 1 °F (36 2 °C), resp  rate (!) 24, height 6' 5" (1 956 m), weight 81 2 kg (179 lb), SpO2 96 %  ,Body mass index is 21 23 kg/m²  Intake/Output Summary (Last 24 hours) at 04/03/18 1719  Last data filed at 04/02/18 2027   Gross per 24 hour   Intake              480 ml   Output              300 ml   Net              180 ml       Physical Exam: Physical Exam   Constitutional: He is oriented to person, place, and time  Awake, alert, no distress  On 3 L of oxygen O2 saturation 96%   HENT:   Head: Normocephalic     Nose: Nose normal  Mouth/Throat: Oropharynx is clear and moist  No oropharyngeal exudate  Eyes: Conjunctivae are normal  No scleral icterus  Neck: Neck supple  No JVD present  Cardiovascular: Normal rate and regular rhythm  Pulmonary/Chest:   Lung sounds are diminished bilaterally but clear   Abdominal: Soft  He exhibits no distension  There is no tenderness  Musculoskeletal: He exhibits no edema  Lymphadenopathy:     He has no cervical adenopathy  Neurological: He is alert and oriented to person, place, and time  Skin: Skin is warm and dry  No erythema  Psychiatric: He has a normal mood and affect  His behavior is normal  Thought content normal         Labs: I have personally reviewed pertinent lab results  , ABG: Lab Results   Component Value Date    PHART 7 429 03/29/2018    QZH9HJI 35 4 (L) 03/29/2018    PO2ART 196 7 (H) 03/29/2018    [id number] 22 9 03/29/2018    BEART -0 9 03/29/2018    SOURCE Radial, Left 03/29/2018   , BNP: No results found for: BNP, CBC: Lab Results   Component Value Date    WBC 8 90 04/03/2018    HGB 12 3 (L) 04/03/2018    HCT 37 2 (L) 04/03/2018    MCV 98 04/03/2018     04/03/2018    MCH 32 4 (H) 04/03/2018    MCHC 33 1 04/03/2018    RDW 14 4 04/03/2018    MPV 6 7 (L) 04/03/2018    NRBC 0 04/03/2018   , CMP: Lab Results   Component Value Date     04/03/2018     09/27/2016    K 4 2 04/03/2018    K 4 2 09/27/2016     04/03/2018     09/27/2016    CO2 26 04/03/2018    CO2 23 09/27/2016    ANIONGAP 9 04/03/2018    BUN 27 (H) 04/03/2018    BUN 14 09/27/2016    CREATININE 1 05 04/03/2018    CREATININE 1 09 09/27/2016    GLUCOSE 142 (H) 04/03/2018    GLUCOSE 89 09/27/2016    CALCIUM 8 9 04/03/2018    CALCIUM 9 3 09/27/2016    AST 15 02/15/2018    AST 18 09/27/2016    ALT 32 02/15/2018    ALT 16 09/27/2016    ALKPHOS 45 (L) 02/15/2018    ALKPHOS 57 09/27/2016    PROT 6 5 02/15/2018    PROT 6 9 09/27/2016    BILITOT 1 00 02/15/2018    BILITOT 1 0 09/27/2016    EGFR 70 04/03/2018   , PT/INR:   Lab Results   Component Value Date    INR 1 08 03/29/2018   , Troponin: No results found for: TROPONIN    Imaging and other studies: I have personally reviewed pertinent reports     and I have personally reviewed pertinent films in PACS

## 2018-04-03 NOTE — SOCIAL WORK
SW made aware that pt's is now agreeable to STR placement  Met with pt to discuss plans and facility options  SW reviewed SNF list with pt  Pt identified [facility] ([address] [facility]) as his preferred choices  Referrals sent  Awaiting decisions  Will provide an update when a bed is secured

## 2018-04-03 NOTE — ASSESSMENT & PLAN NOTE
Amanda Roberts is using 3 l/m of oxygen but with the battery operated oxygen concentrator his oxygen saturation decreased to 86% with ambulation  With ambulating with 3 l/m continuous oxygen lowest O2 sat is 92%

## 2018-04-04 VITALS
HEIGHT: 77 IN | BODY MASS INDEX: 21.13 KG/M2 | WEIGHT: 179 LBS | DIASTOLIC BLOOD PRESSURE: 75 MMHG | TEMPERATURE: 98.7 F | HEART RATE: 74 BPM | RESPIRATION RATE: 20 BRPM | OXYGEN SATURATION: 96 % | SYSTOLIC BLOOD PRESSURE: 147 MMHG

## 2018-04-04 PROBLEM — R42 VERTIGO: Chronic | Status: RESOLVED | Noted: 2018-03-30 | Resolved: 2018-04-04

## 2018-04-04 PROBLEM — J96.01 ACUTE HYPOXEMIC RESPIRATORY FAILURE (HCC): Status: RESOLVED | Noted: 2018-01-31 | Resolved: 2018-04-04

## 2018-04-04 PROBLEM — N17.9 ACUTE KIDNEY INJURY (HCC): Status: RESOLVED | Noted: 2018-03-30 | Resolved: 2018-04-04

## 2018-04-04 PROBLEM — J44.1 COPD EXACERBATION (HCC): Status: RESOLVED | Noted: 2017-12-30 | Resolved: 2018-04-04

## 2018-04-04 PROBLEM — I26.99 PULMONARY EMBOLISM (HCC): Status: RESOLVED | Noted: 2018-02-14 | Resolved: 2018-04-04

## 2018-04-04 PROBLEM — R03.0 ELEVATED BLOOD PRESSURE READING: Status: RESOLVED | Noted: 2018-03-19 | Resolved: 2018-04-04

## 2018-04-04 LAB
ANION GAP SERPL CALCULATED.3IONS-SCNC: 9 MMOL/L (ref 4–13)
BASOPHILS # BLD AUTO: 0 THOUSANDS/ΜL (ref 0–0.1)
BASOPHILS NFR BLD AUTO: 0 % (ref 0–1)
BUN SERPL-MCNC: 25 MG/DL (ref 5–25)
CALCIUM SERPL-MCNC: 8.9 MG/DL (ref 8.3–10.1)
CHLORIDE SERPL-SCNC: 100 MMOL/L (ref 100–108)
CO2 SERPL-SCNC: 26 MMOL/L (ref 21–32)
CREAT SERPL-MCNC: 1.12 MG/DL (ref 0.6–1.3)
EOSINOPHIL # BLD AUTO: 0 THOUSAND/ΜL (ref 0–0.61)
EOSINOPHIL NFR BLD AUTO: 0 % (ref 0–6)
ERYTHROCYTE [DISTWIDTH] IN BLOOD BY AUTOMATED COUNT: 14.1 % (ref 11.6–15.1)
GFR SERPL CREATININE-BSD FRML MDRD: 64 ML/MIN/1.73SQ M
GLUCOSE SERPL-MCNC: 131 MG/DL (ref 65–140)
HCT VFR BLD AUTO: 38.7 % (ref 42–52)
HGB BLD-MCNC: 13.2 G/DL (ref 14–18)
LYMPHOCYTES # BLD AUTO: 0.7 THOUSANDS/ΜL (ref 0.6–4.47)
LYMPHOCYTES NFR BLD AUTO: 7 % (ref 14–44)
MAGNESIUM SERPL-MCNC: 2.1 MG/DL (ref 1.6–2.6)
MCH RBC QN AUTO: 32.8 PG (ref 27–31)
MCHC RBC AUTO-ENTMCNC: 34 G/DL (ref 31.4–37.4)
MCV RBC AUTO: 97 FL (ref 82–98)
MONOCYTES # BLD AUTO: 0.5 THOUSAND/ΜL (ref 0.17–1.22)
MONOCYTES NFR BLD AUTO: 4 % (ref 4–12)
NEUTROPHILS # BLD AUTO: 9.8 THOUSANDS/ΜL (ref 1.85–7.62)
NEUTS SEG NFR BLD AUTO: 89 % (ref 43–75)
NRBC BLD AUTO-RTO: 0 /100 WBCS
PHOSPHATE SERPL-MCNC: 3.8 MG/DL (ref 2.3–4.1)
PLATELET # BLD AUTO: 192 THOUSANDS/UL (ref 130–400)
PLATELET BLD QL SMEAR: ADEQUATE
PMV BLD AUTO: 6.5 FL (ref 8.9–12.7)
POTASSIUM SERPL-SCNC: 4.3 MMOL/L (ref 3.5–5.3)
RBC # BLD AUTO: 4.01 MILLION/UL (ref 4.7–6.1)
SODIUM SERPL-SCNC: 135 MMOL/L (ref 136–145)
THEOPHYLLINE SERPL-MCNC: 6.4 UG/ML (ref 10–20)
TOXIC GRANULES BLD QL SMEAR: PRESENT
WBC # BLD AUTO: 11 THOUSAND/UL (ref 4.8–10.8)

## 2018-04-04 PROCEDURE — 80198 ASSAY OF THEOPHYLLINE: CPT | Performed by: INTERNAL MEDICINE

## 2018-04-04 PROCEDURE — 85025 COMPLETE CBC W/AUTO DIFF WBC: CPT | Performed by: FAMILY MEDICINE

## 2018-04-04 PROCEDURE — 83735 ASSAY OF MAGNESIUM: CPT | Performed by: FAMILY MEDICINE

## 2018-04-04 PROCEDURE — 97110 THERAPEUTIC EXERCISES: CPT

## 2018-04-04 PROCEDURE — 94760 N-INVAS EAR/PLS OXIMETRY 1: CPT

## 2018-04-04 PROCEDURE — 80048 BASIC METABOLIC PNL TOTAL CA: CPT | Performed by: FAMILY MEDICINE

## 2018-04-04 PROCEDURE — 84100 ASSAY OF PHOSPHORUS: CPT | Performed by: FAMILY MEDICINE

## 2018-04-04 PROCEDURE — 99232 SBSQ HOSP IP/OBS MODERATE 35: CPT | Performed by: INTERNAL MEDICINE

## 2018-04-04 PROCEDURE — 82103 ALPHA-1-ANTITRYPSIN TOTAL: CPT | Performed by: INTERNAL MEDICINE

## 2018-04-04 PROCEDURE — 94640 AIRWAY INHALATION TREATMENT: CPT

## 2018-04-04 RX ORDER — PREDNISONE 10 MG/1
TABLET ORAL
Qty: 30 TABLET | Refills: 0
Start: 2018-04-04 | End: 2018-06-28 | Stop reason: ALTCHOICE

## 2018-04-04 RX ADMIN — IPRATROPIUM BROMIDE AND ALBUTEROL SULFATE 3 ML: .5; 3 SOLUTION RESPIRATORY (INHALATION) at 02:42

## 2018-04-04 RX ADMIN — APIXABAN 5 MG: 5 TABLET, FILM COATED ORAL at 08:17

## 2018-04-04 RX ADMIN — PANTOPRAZOLE SODIUM 40 MG: 40 TABLET, DELAYED RELEASE ORAL at 06:05

## 2018-04-04 RX ADMIN — METHYLPREDNISOLONE SODIUM SUCCINATE 20 MG: 40 INJECTION, POWDER, FOR SOLUTION INTRAMUSCULAR; INTRAVENOUS at 11:21

## 2018-04-04 RX ADMIN — IPRATROPIUM BROMIDE AND ALBUTEROL SULFATE 3 ML: .5; 3 SOLUTION RESPIRATORY (INHALATION) at 07:58

## 2018-04-04 RX ADMIN — THEOPHYLLINE ANHYDROUS 400 MG: 400 CAPSULE, EXTENDED RELEASE ORAL at 08:18

## 2018-04-04 RX ADMIN — IPRATROPIUM BROMIDE AND ALBUTEROL SULFATE 3 ML: .5; 3 SOLUTION RESPIRATORY (INHALATION) at 13:43

## 2018-04-04 RX ADMIN — AMLODIPINE BESYLATE 10 MG: 10 TABLET ORAL at 08:17

## 2018-04-04 RX ADMIN — METHYLPREDNISOLONE SODIUM SUCCINATE 20 MG: 40 INJECTION, POWDER, FOR SOLUTION INTRAMUSCULAR; INTRAVENOUS at 02:37

## 2018-04-04 RX ADMIN — FLUTICASONE PROPIONATE 1 SPRAY: 50 SPRAY, METERED NASAL at 08:18

## 2018-04-04 NOTE — PROGRESS NOTES
[facility] Practice Progress Note - Christiano Holt 75 y o  male MRN: [id number]    Unit/Bed#: [address] [place] [address]- Encounter: [id number]      Assessment/Plan:  Acute on chronic SOB is likely 2/2 progressive decline of lung function     -On home 3 L Nc, Monitor vitals, keep pulse oximetry greater than 92%   -theophylline 400mg daily to be cont as an outpatient  -d/c solumedrol, start prednisone 40mg daily w/ 10mg taper every 4th day  -5 day course of levaquin completed  -Duo nebs every 4 hours scheduled & every 2 hours p r n   -Urine Legionella and strep pneumonia negative  -pulm recs appreciated  -echo showed EF 60-65%, mild concentric LV hypertrophy     H/O Recent PE   -2/14/18 acute PE in distal left main pulmonary artery  -Continue home Eliquis  -repeat CT shows resolution of clot     Neoplasm of unknown certainty of lung:  -PET-CT 2/12/18 right upper lung nodule noted  -stable     Alpha 1 antitrypsin deficiency  -Continue weekly Zemaira every Tuesday per outpt regimen     CLAYTON POA, resolved   -POA Cr 1 44, today pending  -Daily BMPs     HTN  -stable  -continue home amlodipine 10 mg daily     GERD  -Continue Protonix 40 mg daily     BPH  -Continue home Flomax 0 4mg daily     Insomnia  -Continue home zolpidem 10 mg q h s      H/O vertigo  -Continue home meclizine 12 5 mg p r n      FEN & DVT PPX  -Cardiac diet, replete electrolytes as needed  -Eliquis and SCDs      Subjective:   Patient seen and examined  Doing the same this morning  Has half hour periods of time when he is not SOB  Denies CP/N/V/F/C  Objective:     Vitals: Blood pressure 159/78, pulse 74, temperature 99 8 °F (37 7 °C), temperature source Temporal, resp  rate 20, height 6' 5" (1 956 m), weight 81 2 kg (179 lb), SpO2 96 %  ,Body mass index is 21 23 kg/m²    Wt Readings from Last 3 Encounters:   03/29/18 81 2 kg (179 lb)   03/29/18 80 7 kg (178 lb)   03/23/18 80 3 kg (177 lb)     No intake or output data in the 24 hours ending 04/04/18 2896    Physical Exam: General appearance: alert and oriented, in no acute distress  Lungs: crackles  Heart: regular rate and rhythm, S1, S2 normal, no murmur, click, rub or gallop  Abdomen: soft, non-tender; bowel sounds normal; no masses,  no organomegaly  Extremities: extremities normal, warm and well-perfused; no cyanosis, clubbing, or edema     No results found for this or any previous visit (from the past 24 hour(s))      Current Facility-Administered Medications   Medication Dose Route Frequency Provider Last Rate Last Dose    acetaminophen (TYLENOL) tablet 650 mg  650 mg Oral Q6H PRN Nell Sahu, DO        amLODIPine (NORVASC) tablet 10 mg  10 mg Oral Daily Nell Sahu, DO   10 mg at 04/03/18 0825    apixaban (ELIQUIS) tablet 5 mg  5 mg Oral BID Nell Sahu, DO   5 mg at 04/03/18 1801    fluticasone (FLONASE) 50 mcg/act nasal spray 1 spray  1 spray Each Nare Daily Nell Sahu, DO   1 spray at 04/03/18 0825    ipratropium-albuterol (DUO-NEB) 0 5-2 5 mg/3 mL inhalation solution 3 mL  3 mL Nebulization Q2H PRN Nell Sahu, DO   3 mL at 03/30/18 1020    ipratropium-albuterol (DUO-NEB) 0 5-2 5 mg/3 mL inhalation solution 3 mL  3 mL Nebulization Q6H Nell Sahu, DO   3 mL at 04/04/18 0242    meclizine (ANTIVERT) tablet 12 5 mg  12 5 mg Oral Daily PRN Nell Sahu, DO        methylPREDNISolone sodium succinate (Solu-MEDROL) injection 20 mg  20 mg Intravenous Q8H Darold Kawasaki, MD   20 mg at 04/04/18 0237    ondansetron (ZOFRAN) injection 4 mg  4 mg Intravenous Q6H PRN Nell Sahu, DO        pantoprazole (PROTONIX) EC tablet 40 mg  40 mg Oral Early Morning Nell Sahu, DO   40 mg at 04/04/18 0605    tamsulosin (FLOMAX) capsule 0 4 mg  0 4 mg Oral HS Nell Sahu DO   0 4 mg at 04/03/18 2114    theophylline (ESTEFANIA-24) 24 hr capsule 400 mg  400 mg Oral Daily Mortimer Lather, MD   400 mg at 04/03/18 0825    zolpidem (AMBIEN) tablet 10 mg 10 mg Oral HS PRN Nell Sahu DO   10 mg at 04/03/18 2142       Invasive Devices     Peripheral Intravenous Line            Peripheral IV 04/02/18 Left Forearm 2 days                Lab, Imaging and other studies: I have personally reviewed pertinent reports      VTE Pharmacologic Prophylaxis: Eliquis  VTE Mechanical Prophylaxis: sequential compression device    Sonny Valentine MD

## 2018-04-04 NOTE — DISCHARGE SUMMARY
[facility] 26 Discharge Summary - Medical Lula Frank 75 y o  male MRN: [id number]    [place] [address] Room / Bed: [address] [place] [address]/4 [place] Encounter: [id number]    BRIEF OVERVIEW  Admitting Provider: Ben Meeks MD  Discharge Provider: Kenia Gipson MD  Discharge To:  [facility]  Facility: [facility]    Outpatient Follow-Up: [facility] and Dr Mariana Boucher  Things to address at first follow up visit: acute on chronic respiratory failure, theophylline initiation, completion of prednisone taper  Labs and results pending at discharge: none    Admission Date: 3/29/2018     Discharge Date: 4/4/18  Primary Discharge Diagnosis  Principal Problem (Resolved):    Acute hypoxemic respiratory failure (HCC)  Active Problems:    AAT (alpha-1-antitrypsin) deficiency (HCC)    Moderate COPD (chronic obstructive pulmonary disease) ([address] )    Gastroesophageal reflux disease without esophagitis    Dyspnea    HTN (hypertension)    BPH (benign prostatic hyperplasia)    Lung nodules  Resolved Problems:    COPD exacerbation (HCC)    Pulmonary embolism (HCC)    Elevated blood pressure reading    Acute kidney injury ([address] )    Vertigo        Consulting Providers   [place] - finish 5 day course of levaquin, prednisone taper upon discharge, theophylline 400mg daily, cont eliquis, cont outpatient follow up        [facility]    Procedures Performed/Pertinent Test results  Results for orders placed or performed during the hospital encounter of 03/29/18   MRSA culture   Result Value Ref Range    MRSA Culture Only       No Methicillin Resistant Staphlyococcus aureus (MRSA) isolated   Strep Pneumoniae, Urine   Result Value Ref Range    Strep pneumoniae antigen, urine Negative Negative   Basic metabolic panel   Result Value Ref Range    Sodium 139 136 - 145 mmol/L    Potassium 3 5 3 5 - 5 3 mmol/L    Chloride 103 100 - 108 mmol/L    CO2 25 21 - 32 mmol/L    Anion Gap 11 4 - 13 mmol/L    BUN 19 5 - 25 mg/dL    Creatinine 1 44 (H) 0 60 - 1 30 mg/dL    Glucose 133 65 - 140 mg/dL    Calcium 9 6 8 3 - 10 1 mg/dL    eGFR 47 ml/min/1 73sq m   NT-BNP PRO   Result Value Ref Range    NT-proBNP 141 (H) <125 pg/mL   CBC and differential   Result Value Ref Range    WBC 7 20 4 80 - 10 80 Thousand/uL    RBC 4 34 (L) 4 70 - 6 10 Million/uL    Hemoglobin 14 1 14 0 - 18 0 g/dL    Hematocrit 41 8 (L) 42 0 - 52 0 %    MCV 96 82 - 98 fL    MCH 32 6 (H) 27 0 - 31 0 pg    MCHC 33 8 31 4 - 37 4 g/dL    RDW 14 1 11 6 - 15 1 %    MPV 6 1 (L) 8 9 - 12 7 fL    Platelets 303 753 - 585 Thousands/uL    nRBC 0 /100 WBCs    Neutrophils Relative 72 43 - 75 %    Lymphocytes Relative 16 14 - 44 %    Monocytes Relative 9 4 - 12 %    Eosinophils Relative 2 0 - 6 %    Basophils Relative 0 0 - 1 %    Neutrophils Absolute 5 20 1 85 - 7 62 Thousands/µL    Lymphocytes Absolute 1 20 0 60 - 4 47 Thousands/µL    Monocytes Absolute 0 70 0 17 - 1 22 Thousand/µL    Eosinophils Absolute 0 10 0 00 - 0 61 Thousand/µL    Basophils Absolute 0 00 0 00 - 0 10 Thousands/µL   Protime-INR   Result Value Ref Range    Protime 11 4 9 4 - 11 7 seconds    INR 1 08 0 86 - 1 16   APTT   Result Value Ref Range    PTT 26 24 - 33 seconds   Troponin I   Result Value Ref Range    Troponin I <0 02 <=0 04 ng/mL   Blood gas, arterial   Result Value Ref Range    pH, Arterial 7 429 7 350 - 7 450    PH ART TC 7 425 7 350 - 7 450    pCO2, Arterial 35 4 (L) 36 0 - 44 0 mm Hg    PCO2 (TC) Arterial 35 9 (L) 36 0 - 44 0 mm Hg    pO2, Arterial 196 7 (H) 75 0 - 129 0 mm Hg    PO2 (TC) Arterial 198 3 (H) 75 0 - 129 0 mm Hg    HCO3, Arterial 22 9 22 0 - 28 0 mmol/L    Base Excess, Arterial -0 9 mmol/L    O2 Content, Arterial 20 0 16 0 - 23 0 mL/dL    O2 HGB,Arterial  98 4 (H) 94 0 - 97 0 %    SOURCE Radial, Left     [place]OR[place] TEST Yes     Temperature 99 2 Degrees Fehrenheit    Non Vent type BIPAP BIPAP     IPAP 12     EPAP 6     BIPAP fio2 50 %   Troponin I   Result Value Ref Range    Troponin I <0 02 <=0 04 ng/mL   Legionella antigen, urine   Result Value Ref Range    Legionella Urinary Antigen Negative Negative   Basic metabolic panel   Result Value Ref Range    Sodium 135 (L) 136 - 145 mmol/L    Potassium 4 0 3 5 - 5 3 mmol/L    Chloride 102 100 - 108 mmol/L    CO2 22 21 - 32 mmol/L    Anion Gap 11 4 - 13 mmol/L    BUN 20 5 - 25 mg/dL    Creatinine 1 21 0 60 - 1 30 mg/dL    Glucose 165 (H) 65 - 140 mg/dL    Calcium 8 8 8 3 - 10 1 mg/dL    eGFR 59 ml/min/1 73sq m   Magnesium   Result Value Ref Range    Magnesium 1 8 1 6 - 2 6 mg/dL   Phosphorus   Result Value Ref Range    Phosphorus 3 0 2 3 - 4 1 mg/dL   CBC (With Platelets)   Result Value Ref Range    WBC 4 10 (L) 4 80 - 10 80 Thousand/uL    RBC 3 89 (L) 4 70 - 6 10 Million/uL    Hemoglobin 12 6 (L) 14 0 - 18 0 g/dL    Hematocrit 37 5 (L) 42 0 - 52 0 %    MCV 96 82 - 98 fL    MCH 32 4 (H) 27 0 - 31 0 pg    MCHC 33 7 31 4 - 37 4 g/dL    RDW 14 1 11 6 - 15 1 %    Platelets 405 535 - 806 Thousands/uL    MPV 6 5 (L) 8 9 - 12 7 fL   Procalcitonin   Result Value Ref Range    Procalcitonin <0 05 <=0 25 ng/ml   CBC and differential   Result Value Ref Range    WBC 7 00 4 80 - 10 80 Thousand/uL    RBC 3 75 (L) 4 70 - 6 10 Million/uL    Hemoglobin 12 3 (L) 14 0 - 18 0 g/dL    Hematocrit 36 4 (L) 42 0 - 52 0 %    MCV 97 82 - 98 fL    MCH 32 8 (H) 27 0 - 31 0 pg    MCHC 33 8 31 4 - 37 4 g/dL    RDW 14 2 11 6 - 15 1 %    MPV 6 7 (L) 8 9 - 12 7 fL    Platelets 836 622 - 184 Thousands/uL    nRBC 0 /100 WBCs    Neutrophils Relative 90 (H) 43 - 75 %    Lymphocytes Relative 6 (L) 14 - 44 %    Monocytes Relative 5 4 - 12 %    Eosinophils Relative 0 0 - 6 %    Basophils Relative 0 0 - 1 %    Neutrophils Absolute 6 30 1 85 - 7 62 Thousands/µL    Lymphocytes Absolute 0 40 (L) 0 60 - 4 47 Thousands/µL    Monocytes Absolute 0 30 0 17 - 1 22 Thousand/µL    Eosinophils Absolute 0 00 0 00 - 0 61 Thousand/µL    Basophils Absolute 0 00 0 00 - 0 10 Thousands/µL   Magnesium   Result Value Ref Range    Magnesium 1 9 1 6 - 2 6 mg/dL   Phosphorus   Result Value Ref Range    Phosphorus 3 2 2 3 - 4 1 mg/dL   Basic metabolic panel   Result Value Ref Range    Sodium 138 136 - 145 mmol/L    Potassium 3 9 3 5 - 5 3 mmol/L    Chloride 104 100 - 108 mmol/L    CO2 24 21 - 32 mmol/L    Anion Gap 10 4 - 13 mmol/L    BUN 19 5 - 25 mg/dL    Creatinine 1 03 0 60 - 1 30 mg/dL    Glucose 133 65 - 140 mg/dL    Calcium 9 1 8 3 - 10 1 mg/dL    eGFR 71 ml/min/1 73sq m   CBC and Platelet   Result Value Ref Range    WBC 8 10 4 80 - 10 80 Thousand/uL    RBC 3 74 (L) 4 70 - 6 10 Million/uL    Hemoglobin 12 3 (L) 14 0 - 18 0 g/dL    Hematocrit 36 3 (L) 42 0 - 52 0 %    MCV 97 82 - 98 fL    MCH 32 8 (H) 27 0 - 31 0 pg    MCHC 33 7 31 4 - 37 4 g/dL    RDW 14 4 11 6 - 15 1 %    Platelets 709 077 - 093 Thousands/uL    MPV 6 3 (L) 8 9 - 12 7 fL   Basic metabolic panel   Result Value Ref Range    Sodium 138 136 - 145 mmol/L    Potassium 3 9 3 5 - 5 3 mmol/L    Chloride 104 100 - 108 mmol/L    CO2 23 21 - 32 mmol/L    Anion Gap 11 4 - 13 mmol/L    BUN 20 5 - 25 mg/dL    Creatinine 1 01 0 60 - 1 30 mg/dL    Glucose 147 (H) 65 - 140 mg/dL    Calcium 8 5 8 3 - 10 1 mg/dL    eGFR 73 ml/min/1 73sq m   Magnesium   Result Value Ref Range    Magnesium 2 1 1 6 - 2 6 mg/dL   Phosphorus   Result Value Ref Range    Phosphorus 2 7 2 3 - 4 1 mg/dL   CBC   Result Value Ref Range    WBC 8 30 4 80 - 10 80 Thousand/uL    RBC 3 89 (L) 4 70 - 6 10 Million/uL    Hemoglobin 12 5 (L) 14 0 - 18 0 g/dL    Hematocrit 37 9 (L) 42 0 - 52 0 %    MCV 98 82 - 98 fL    MCH 32 1 (H) 27 0 - 31 0 pg    MCHC 32 9 31 4 - 37 4 g/dL    RDW 14 0 11 6 - 15 1 %    Platelets 018 791 - 168 Thousands/uL    MPV 6 5 (L) 8 9 - 12 7 fL   Basic metabolic panel   Result Value Ref Range    Sodium 138 136 - 145 mmol/L    Potassium 4 0 3 5 - 5 3 mmol/L    Chloride 103 100 - 108 mmol/L    CO2 26 21 - 32 mmol/L    Anion Gap 9 4 - 13 mmol/L    BUN 24 5 - 25 mg/dL    Creatinine 1 07 0 60 - 1 30 mg/dL    Glucose 154 (H) 65 - 140 mg/dL    Calcium 8 9 8 3 - 10 1 mg/dL    eGFR 68 ml/min/1 73sq m   Magnesium   Result Value Ref Range    Magnesium 2 0 1 6 - 2 6 mg/dL   Phosphorus   Result Value Ref Range    Phosphorus 3 1 2 3 - 4 1 mg/dL   Magnesium   Result Value Ref Range    Magnesium 2 0 1 6 - 2 6 mg/dL   Phosphorus   Result Value Ref Range    Phosphorus 3 3 2 3 - 4 1 mg/dL   CBC and differential   Result Value Ref Range    WBC 8 90 4 80 - 10 80 Thousand/uL    RBC 3 80 (L) 4 70 - 6 10 Million/uL    Hemoglobin 12 3 (L) 14 0 - 18 0 g/dL    Hematocrit 37 2 (L) 42 0 - 52 0 %    MCV 98 82 - 98 fL    MCH 32 4 (H) 27 0 - 31 0 pg    MCHC 33 1 31 4 - 37 4 g/dL    RDW 14 4 11 6 - 15 1 %    MPV 6 7 (L) 8 9 - 12 7 fL    Platelets 624 128 - 638 Thousands/uL    nRBC 0 /100 WBCs    Neutrophils Relative 89 (H) 43 - 75 %    Lymphocytes Relative 7 (L) 14 - 44 %    Monocytes Relative 4 4 - 12 %    Eosinophils Relative 0 0 - 6 %    Basophils Relative 0 0 - 1 %    Neutrophils Absolute 7 90 (H) 1 85 - 7 62 Thousands/µL    Lymphocytes Absolute 0 60 0 60 - 4 47 Thousands/µL    Monocytes Absolute 0 40 0 17 - 1 22 Thousand/µL    Eosinophils Absolute 0 00 0 00 - 0 61 Thousand/µL    Basophils Absolute 0 00 0 00 - 0 10 Thousands/µL   Basic metabolic panel   Result Value Ref Range    Sodium 137 136 - 145 mmol/L    Potassium 4 2 3 5 - 5 3 mmol/L    Chloride 102 100 - 108 mmol/L    CO2 26 21 - 32 mmol/L    Anion Gap 9 4 - 13 mmol/L    BUN 27 (H) 5 - 25 mg/dL    Creatinine 1 05 0 60 - 1 30 mg/dL    Glucose 142 (H) 65 - 140 mg/dL    Calcium 8 9 8 3 - 10 1 mg/dL    eGFR 70 ml/min/1 73sq m   Theophylline level   Result Value Ref Range    Theophylline Lvl 6 4 (L) 10 0 - 20 0 ug/mL   Basic metabolic panel   Result Value Ref Range    Sodium 135 (L) 136 - 145 mmol/L    Potassium 4 3 3 5 - 5 3 mmol/L    Chloride 100 100 - 108 mmol/L    CO2 26 21 - 32 mmol/L    Anion Gap 9 4 - 13 mmol/L    BUN 25 5 - 25 mg/dL    Creatinine 1 12 0 60 - 1 30 mg/dL    Glucose 131 65 - 140 mg/dL    Calcium 8 9 8 3 - 10 1 mg/dL    eGFR 64 ml/min/1 73sq m   CBC and differential   Result Value Ref Range    WBC 11 00 (H) 4 80 - 10 80 Thousand/uL    RBC 4 01 (L) 4 70 - 6 10 Million/uL    Hemoglobin 13 2 (L) 14 0 - 18 0 g/dL    Hematocrit 38 7 (L) 42 0 - 52 0 %    MCV 97 82 - 98 fL    MCH 32 8 (H) 27 0 - 31 0 pg    MCHC 34 0 31 4 - 37 4 g/dL    RDW 14 1 11 6 - 15 1 %    MPV 6 5 (L) 8 9 - 12 7 fL    Platelets 552 774 - 455 Thousands/uL    nRBC 0 /100 WBCs    Neutrophils Relative 89 (H) 43 - 75 %    Lymphocytes Relative 7 (L) 14 - 44 %    Monocytes Relative 4 4 - 12 %    Eosinophils Relative 0 0 - 6 %    Basophils Relative 0 0 - 1 %    Neutrophils Absolute 9 80 (H) 1 85 - 7 62 Thousands/µL    Lymphocytes Absolute 0 70 0 60 - 4 47 Thousands/µL    Monocytes Absolute 0 50 0 17 - 1 22 Thousand/µL    Eosinophils Absolute 0 00 0 00 - 0 61 Thousand/µL    Basophils Absolute 0 00 0 00 - 0 10 Thousands/µL   Magnesium   Result Value Ref Range    Magnesium 2 1 1 6 - 2 6 mg/dL   Phosphorus   Result Value Ref Range    Phosphorus 3 8 2 3 - 4 1 mg/dL   ECG 12 lead   Result Value Ref Range    Ventricular Rate 99 BPM    Atrial Rate 99 BPM    VT Interval 122 ms    QRSD Interval 94 ms    QT Interval 324 ms    QTC Interval 415 ms    P Axis  degrees    QRS Axis 24 degrees    T Wave Axis -22 degrees   Smear Review(Phlebs Do Not Order)   Result Value Ref Range    Platelet Estimate Adequate Adequate   Smear Review(Phlebs Do Not Order)   Result Value Ref Range    Platelet Estimate Adequate Adequate   Smear Review(Phlebs Do Not Order)   Result Value Ref Range    Toxic Granulation Present     Platelet Estimate Adequate Adequate     CTA chest pe study   Final Result      1  No PE  Previously noted left lower lobe filling defect has resolved during the interim  2   Severe COPD with scattered regions of pulmonary scarring  3   Stable right apical and left basilar pulmonary nodules    The right apical nodule was FDG avid on recent PET/CT dated 2/12/2018 consistent with malignancy  Please refer to that report  Per previous reports, tissue sampling of the right upper lobe    nodule is currently planned  Workstation performed: [id number]         XR chest portable   Final Result      Stable chronic changes within the lung fields  No pneumonia or congestive failure  Workstation performed: [id number]              HPI ( per H&P)  76 y/o male with PMH BPH, COPD, Alpha-1 antitrypsin deficiency, HTN, GERD, Insomnia, recent PE and detection of nodules in lungs presents with worsening of SOB for the past 2 days  Pt states that he has shortness of breath for several weeks now, but the past few days he found it extremely difficult to breath  He has noticed that the shortness of breath is worse with exertion and improves when he is laying down with one pillow propping him up  Pt is on continues use of 3L O2 at home  In the past few days pt did nebulizer treatments every 6 hours and used his inhalers but nothing was providing him any relief  Pt states he has been feeling more hot lately, but no fever or chills  For the past week he has been experiencing upper respiratory symptoms of runny nose, mild congestion and some ear pain  Additionally he has a worsening of a non-productive cough, which has worsening especially in the past few days  Pt states that he has a lot appetite and feeling more fatigued in the last few days  He has noticed a numbness and tingling down the right side of his hands and legs  PT was recently admitted to the hospital for PE after which he has been started on Eliquis  He was discharged on a Prednisone taper which he completed 2 days ago   Pt is following outpatient with Dr Charity Edwards and saw him in the office today       In the ED patient received Solumedrol 125mg IV and Ativan 0 5mg IV     Hospital Course  Acute on chronic hypoxic respiratory failure likely 2/2 progressive decline of lung function     -On home 3 L Nc  -theophylline 400mg daily to be cont as an outpatient  -d/c solumedrol, start prednisone 40mg daily w/ 10mg taper every 4th day  -5 day course of levaquin completed  -Duo nebs every 4 hours scheduled & every 2 hours p r n    -Urine Legionella and strep pneumonia negative  -echo showed EF 60-65%, mild concentric LV hypertrophy     H/O Recent PE   -2/14/18 acute PE in distal left main pulmonary artery  -Continue home Eliquis  -repeat CT shows resolution of clot     Neoplasm of unknown certainty of lung:  -PET-CT 2/12/18 right upper lung nodule noted  -stable, will follow up with Dr Tesfaye Cantu as an outpatient     Alpha 1 antitrypsin deficiency  -Continue weekly Zemaira every Tuesday per outpt regimen     CLAYTON POA, resolved   -POA Cr 1 44, back to baseline upon discharge     HTN  -stable  -continue home amlodipine 10 mg daily     GERD  -Continue Protonix 40 mg daily     BPH  -Continue home Flomax 0 4mg daily     Insomnia  -Continue home zolpidem 10 mg q h s      H/O vertigo  -Continue home meclizine 12 5 mg p r n      FEN & DVT PPX  -Cardiac diet, replete electrolytes as needed  -Eliquis and SCDs    Physical Exam at Discharge  General appearance: alert and oriented, in no acute distress  Lungs: crackles  Heart: regular rate and rhythm, S1, S2 normal, no murmur, click, rub or gallop  Abdomen: soft, non-tender; bowel sounds normal; no masses,  no organomegaly  Extremities: extremities normal, warm and well-perfused; no cyanosis, clubbing, or edema        Medications   STOP taking these medications     STOP taking these medications    INCRUSE ELLIPTA 62 5 MCG/INH Aepb   Generic drug: Umeclidinium Bromide    TAKE these medications     TAKE these medications     Morning Afternoon Evening Bedtime As Needed    ADVAIR DISKUS 250-50 mcg/dose inhaler   Generic drug: fluticasone-salmeterol   Inhale   Refills: 0                    * albuterol (2 5 mg/3 mL) 0 083 % nebulizer solution Inhale 1 each every 4 (four) hours as needed   Refills: 0                    * albuterol 90 mcg/act inhaler   Commonly known as: PROVENTIL HFA,VENTOLIN HFA   Inhale 2 puffs every 6 (six) hours as needed for wheezing   Refills: 0   Doctor's comments: Please substitute based on patient insurance                    amLODIPine 10 mg tablet   Commonly known as: NORVASC   Take 1 tablet (10 mg total) by mouth daily   Refills: 0                    apixaban 5 mg   Commonly known as: ELIQUIS   Take 1 tablet (5 mg total) by mouth 2 (two) times a day   Refills: 0                    fluticasone 50 mcg/act nasal spray   Commonly known as: FLONASE   Refills: 0                    meclizine 12 5 MG tablet   Commonly known as: ANTIVERT   Take 1 tablet by mouth daily   Refills: 0                    omeprazole 40 MG capsule   Commonly known as: PriLOSEC   Refills: 0                    predniSONE 10 mg tablet   Take 40mg for 3 days then 30mg x3 days, 20mg x3 days, then 10mg x3days then stop   Refills: 0                    tamsulosin 0 4 mg   Commonly known as: FLOMAX   Take 0 4 mg by mouth daily at bedtime   Refills: 0                    theophylline 400 MG 24 hr capsule   Commonly known as: ESTEFANIA-24   Start taking on: 4/5/2018   Take 1 capsule (400 mg total) by mouth daily   Refills: 0                    ZEMAIRA IV   Infuse into a venous catheter once a week On tuesday -1000mg /50 ml   Refills: 0                    zolpidem 6 25 MG CR tablet   Commonly known as: AMBIEN CR   Take 10 mg by mouth daily at bedtime as needed for sleep   Refills: 0                         Allergies  Allergies   Allergen Reactions    Chantix [Varenicline]      Caused prostate infection       Diet restrictions: none  Activity restrictions: none  Code Status: Level 1 - Full Code  Advance Directive and Living Will: <no information>  Power of :    POLST:      Discharge Condition: stable      Discharge  Statement   I spent 30 minutes discharging the patient  This time was spent on the day of discharge  I had direct contact with the patient on the day of discharge  Additional documentation is required if more than 30 minutes were spent on discharge

## 2018-04-04 NOTE — PHYSICAL THERAPY NOTE
04/04/18 1419   Pain Assessment   Pain Assessment No/denies pain   Restrictions/Precautions   Other Precautions O2;Fall Risk  (SOB)   General   Chart Reviewed Yes   Family/Caregiver Present No   Subjective   Subjective "I think I'm going to [address] at 4:00 today"   Bed Mobility   Supine to Sit 7  Independent   Sit to Supine 7  Independent   Transfers   Sit to Stand 5  Supervision   Stand to Sit 5  Supervision   Ambulation/Elevation   Gait pattern (generalized mild unsteadiness)   Gait Assistance (stand by assist/S)   Assistive Device None   Distance 50 feet;rest;80 feet;3L O2 SAO2 93-96% with amb  Pt moderately SOB with amb and needs increased time to recover  Balance   Static Sitting Good   Static Standing Fair   Dynamic Standing Fair   Ambulatory Fair -   Activity Tolerance   Activity Tolerance Patient limited by fatigue  (and SOB)   Assessment   Assessment Pt is improving with amb endurance, but is noted with moderate SOB at end of amb, needing increased time to recover  Pt will cont to benefit from skilled PT services  Plan   Treatment/Interventions ADL retraining;Functional transfer training;LE strengthening/ROM; Elevations; Therapeutic exercise; Endurance training;Patient/family training;Gait training   PT Frequency 5x/wk   Recommendation   Recommendation (STR)   Licensure   [place] License Number  [id number] [place], [place] [id number]

## 2018-04-04 NOTE — PLAN OF CARE
Problem: PHYSICAL THERAPY ADULT  Goal: Performs mobility at highest level of function for planned discharge setting  See evaluation for individualized goals  Outcome: Progressing  Prognosis: Good  Problem List: Decreased strength, Decreased endurance, Impaired balance, Decreased mobility  Assessment: Pt is improving with amb endurance, but is noted with moderate SOB at end of amb, needing increased time to recover  Pt will cont to benefit from skilled PT services  Recommendation:  (STR)          See flowsheet documentation for full assessment

## 2018-04-04 NOTE — [place] UNIVERSAL TRANSFER FORM
[place] UNIVERSAL TRANSFER FORM  (ALL ITEMS MUST BE COMPLETED)    1  TRANSFER FROM: [address] [facility]      TRANSFER TO: [facility]    2  DATE OF TRANSFER: 4/4/2018                        TIME OF TRANSFER: 430 pm    3  PATIENT NAME: GEOVANY Alaniz      YOB: 1945                             GENDER: male    4  LANGUAGE:   English    5  PHYSICIAN NAME:  Eulalia Jay MD                   PHONE: [phone]    6  CODE STATUS: Level 1 - Full Code        Out of Hospital DNR Attached: No    7  :                                      :  Extended Emergency Contact Information  Primary Emergency Contact: Lucila Ferreira   [place]  Mobile Phone: [phone]  Relation: Daughter           Health Care Representative/Proxy:  No           Legal Guardian:  No             NAME OF:           HEALTH CARE REPRESENTATIVE/PROXY:                                         OR           LEGAL GUARDIAN, IF NOT :                                               PHONE:  (Day)           (Night)                        (Cell)    8  REASON FOR TRANSFER: Short term rehab            V/S: /75 (BP Location: Right arm)   Pulse 74   Temp 98 7 °F (37 1 °C) (Oral)   Resp 20   Ht 6' 5" (1 956 m)   Wt 81 2 kg (179 lb)   SpO2 96%   BMI 21 23 kg/m²           PAIN: None    9  PRIMARY DIAGNOSIS: Acute hypoxemic respiratory failure (HCC)      Secondary Diagnosis:         Pacemaker: No      Internal Defib: No          Mental Health Diagnosis (if Applicable):    10  RESTRAINTS: No     11  RESPIRATORY NEEDS: Oxygen Device 3 L,    12  ISOLATION/PRECAUTION: None    13  ALLERGY: Chantix [varenicline]    14  SENSORY: None           15  SKIN CONDITION: No Wounds    16  DIET: Regular    17  IV ACCESS: None    18  PERSONAL ITEMS SENT WITH PATIENT: Dentures Upper Full and Lower Full    19   ATTACHED DOCUMENTS: MUST ATTACH CURRENT MEDICATION INFORMATION Face Sheet, MAR, Diagnostic Studies, Labs and Discharge Summary    20  AT RISK ALERTS:None        HARM TO: N/A    21  WEIGHT BEARING STATUS:         Left Leg: Full        Right Leg: Full    22  MENTAL STATUS:Alert and Oriented    23  FUNCTION:        Walk: Self        Transfer: Self        Toilet: Self        Feed: Self    24  IMMUNIZATIONS/SCREENING:     Immunization History   Administered Date(s) Administered    Influenza TIV (IM) 09/27/2013, 10/23/2014    Pneumococcal Conjugate 13-Valent 04/26/2016    Pneumococcal Polysaccharide PPV23 04/23/2011    Tdap 04/26/2016    Zoster 10/29/2014, 04/27/2015       25  BOWEL: Continent    26  BLADDER: Continent    27   SENDING FACILITY CONTACT: [place] [address]                  Title: RN         Unit: [address] [facility]        Phone: [phone] [address] (if known):        Title:        Unit:         Phone:         FORM PREFILLED BY (if applicable)       Title:       Unit:        Phone:         Melinda Elizalde RN      Title: Rn       Phone: [phone]

## 2018-04-04 NOTE — SOCIAL WORK
Discharge ordered  Pt will be transferring to [facility] ([facility] for skilled care  Pt's daughter, Valerie Pinto, will transport  Per pt and Valerie Pinto, pt has a 3-hour oxygen tank for the transport  Pt will be transported to the facility at 1600  Pt, unit and [facility] aware of plan

## 2018-04-04 NOTE — PROGRESS NOTES
Progress Note - Pulmonary   Elle Horne 75 y o  male MRN: [id number]  Unit/Bed#: [address] [place] [address] Encounter: [id number]    Assessment:  Dyspnea secondary to severe COPD with with acute exacerbation  Patient has not done well since he had acute RSV respiratory infection and was hospitalized in January this year  He has had decline in his FEV1 down to 1 6 L or 40% of predicted and had increased shortness of breath  He has had some very mild improvement with steroid therapy  Chronic hypoxemic respiratory failure  Alpha 1 antitrypsin deficiency  Small lung nodules possibly inflammatory but cannot exclude neoplasm  Status post recent pulmonary emboli mid February  Most recent CTA of chest showed these have resolved    Plan:  Complete 5 day course of Levaquin  This is for acute bronchitis  Also would Rx with prednisone 40 mg for 3 days and taper by 10 mg every 4th day  He is tolerating theophylline 400 mg extended release once a day  Theophylline level was 6 4 yesterday so does not appear he is a slow metabolizer  I did send off a random alpha-1 antitrypsin level this afternoon to see what his current level is  He did not get his weekly alpha-1 antitrypsin replacement therapy which was due yesterday  I suspect patient will be okay missing a couple weeks of dosing especially if his alpha 1 antitrypsin level is not very low on testing done today  I will follow up with us as outpatient  I did speak with patient's medical team  Continue oxygen 3 liters/minute and nebulizer treatments with DuoNeb every 6 hours  Would continue Eliquis 5 mg b i d  and would treat for at least 6 months given his decreased activity level and persistent dyspnea      Subjective:   No significant change  Still having shortness of breath with activity    Objective:     Vitals: Blood pressure 147/75, pulse 74, temperature 98 7 °F (37 1 °C), temperature source Oral, resp   rate 20, height 6' 5" (1 956 m), weight 81 2 kg (179 lb), SpO2 96 % ,Body mass index is 21 23 kg/m²  No intake or output data in the 24 hours ending 04/04/18 1559    Physical Exam: Physical Exam   Constitutional: He is oriented to person, place, and time  No distress  Patient is comfortable  On 3 L of oxygen O2 saturation is 97%   HENT:   Head: Normocephalic  Nose: Nose normal    Mouth/Throat: Oropharynx is clear and moist  No oropharyngeal exudate  Eyes: Conjunctivae are normal  No scleral icterus  Neck: Neck supple  No JVD present  Cardiovascular: Normal rate, regular rhythm and normal heart sounds  Pulmonary/Chest: Effort normal    Lung sounds are decreased but clear   Abdominal: Soft  He exhibits no distension  There is no tenderness  There is no guarding  Musculoskeletal: He exhibits no edema  Lymphadenopathy:     He has no cervical adenopathy  Neurological: He is alert and oriented to person, place, and time  Skin: Skin is warm  Psychiatric: He has a normal mood and affect  His behavior is normal  Thought content normal         Labs: I have personally reviewed pertinent lab results  , ABG:   Lab Results   Component Value Date    PHART 7 429 03/29/2018    NBQ5AQF 35 4 (L) 03/29/2018    PO2ART 196 7 (H) 03/29/2018    TAI0ONT 22 9 03/29/2018    BEART -0 9 03/29/2018    SOURCE Radial, Left 03/29/2018   , BNP: No results found for: BNP, CBC:   Lab Results   Component Value Date    WBC 11 00 (H) 04/04/2018    HGB 13 2 (L) 04/04/2018    HCT 38 7 (L) 04/04/2018    MCV 97 04/04/2018     04/04/2018    MCH 32 8 (H) 04/04/2018    MCHC 34 0 04/04/2018    RDW 14 1 04/04/2018    MPV 6 5 (L) 04/04/2018    NRBC 0 04/04/2018   , CMP:   Lab Results   Component Value Date     (L) 04/04/2018     09/27/2016    K 4 3 04/04/2018    K 4 2 09/27/2016     04/04/2018     09/27/2016    CO2 26 04/04/2018    CO2 23 09/27/2016    ANIONGAP 9 04/04/2018    BUN 25 04/04/2018    BUN 14 09/27/2016    CREATININE 1 12 04/04/2018    CREATININE 1 09 09/27/2016    GLUCOSE 131 04/04/2018    GLUCOSE 89 09/27/2016    CALCIUM 8 9 04/04/2018    CALCIUM 9 3 09/27/2016    AST 15 02/15/2018    AST 18 09/27/2016    ALT 32 02/15/2018    ALT 16 09/27/2016    ALKPHOS 45 (L) 02/15/2018    ALKPHOS 57 09/27/2016    PROT 6 5 02/15/2018    PROT 6 9 09/27/2016    BILITOT 1 00 02/15/2018    BILITOT 1 0 09/27/2016    EGFR 64 04/04/2018   , PT/INR:   Lab Results   Component Value Date    INR 1 08 03/29/2018   , Troponin: No results found for: TROPONIN    Imaging and other studies: I have personally reviewed pertinent reports     and I have personally reviewed pertinent films in PACS

## 2018-04-04 NOTE — NURSING NOTE
Patient discharged in stable condition via wheelchair  Accompanied by daughter who will be driving patient to [facility] for STR  Discharge instructions reviewed with patient and sent to receiving facility

## 2018-04-05 ENCOUNTER — TELEPHONE (OUTPATIENT)
Dept: CASE MANAGEMENT | Facility: HOSPITAL | Age: 73
End: 2018-04-05

## 2018-04-05 NOTE — TELEPHONE ENCOUNTER
[facility] staff reports that pt is without issues  Dr Kristi Farley in to assess, write orders  Staff has no questions related to transfer instructions

## 2018-04-06 LAB — A1AT SERPL-MCNC: 74 MG/DL (ref 90–200)

## 2018-04-16 NOTE — ASSESSMENT & PLAN NOTE
Ed Alva does receive weekly alpha-1 antitrypsin replacement therapy at his home    This is administered by a visiting nurse

## 2018-04-19 ENCOUNTER — OFFICE VISIT (OUTPATIENT)
Dept: FAMILY MEDICINE CLINIC | Facility: CLINIC | Age: 73
End: 2018-04-19
Payer: MEDICARE

## 2018-04-19 VITALS
BODY MASS INDEX: 21.46 KG/M2 | TEMPERATURE: 97.4 F | WEIGHT: 181 LBS | SYSTOLIC BLOOD PRESSURE: 150 MMHG | OXYGEN SATURATION: 98 % | DIASTOLIC BLOOD PRESSURE: 70 MMHG | HEART RATE: 89 BPM

## 2018-04-19 DIAGNOSIS — J96.11 CHRONIC HYPOXEMIC RESPIRATORY FAILURE (HCC): ICD-10-CM

## 2018-04-19 DIAGNOSIS — E88.01 AAT (ALPHA-1-ANTITRYPSIN) DEFICIENCY (HCC): Primary | ICD-10-CM

## 2018-04-19 DIAGNOSIS — J96.01 ACUTE HYPOXEMIC RESPIRATORY FAILURE (HCC): ICD-10-CM

## 2018-04-19 DIAGNOSIS — J44.9 MODERATE COPD (CHRONIC OBSTRUCTIVE PULMONARY DISEASE) (HCC): ICD-10-CM

## 2018-04-19 DIAGNOSIS — I10 ESSENTIAL HYPERTENSION: ICD-10-CM

## 2018-04-19 PROCEDURE — 99214 OFFICE O/P EST MOD 30 MIN: CPT | Performed by: FAMILY MEDICINE

## 2018-04-19 RX ORDER — THEOPHYLLINE 400 MG/1
TABLET, EXTENDED RELEASE ORAL
COMMUNITY
Start: 2018-04-17 | End: 2018-04-19 | Stop reason: SDUPTHER

## 2018-04-19 NOTE — PROGRESS NOTES
Assessment/Plan:    Will cont pres mnmt cont pres meds  estefania is new  will f/u with specialists and pet scan  instructions     Diagnoses and all orders for this visit:    AAT (alpha-1-antitrypsin) deficiency ([facility] [address] )    Acute hypoxemic respiratory failure (HCC)  -     theophylline (ESTEFANIA-24) 400 MG 24 hr capsule; Take 1 capsule (400 mg total) by mouth daily    Moderate COPD (chronic obstructive pulmonary disease) (HCC)    Chronic hypoxemic respiratory failure (HCC)    Essential hypertension    Other orders  -     Discontinue: theophylline (UNIPHYL) 400 mg 24 hr tablet;           Subjective:      Patient ID: Sunitha Strickland is a 75 y o  male  Patient seen in f/u from d/c  From [facility]  Has been on estefania 24  Doing about the same  Did f/u with heme onc  Will be getting repeat pet scan  For lung nodules  Has appmt with pulm next week some aches and pains  No other new issues        The following portions of the patient's history were reviewed and updated as appropriate: past family history, past medical history, past social history and past surgical history  Review of Systems   Constitutional: Positive for fatigue  HENT: Negative  Eyes: Negative  Respiratory: Positive for shortness of breath  Cardiovascular: Negative  Gastrointestinal: Negative  Musculoskeletal:        See hpi   Skin: Negative  Neurological: Negative  Psychiatric/Behavioral: Negative  Objective:      /70 (BP Location: Left arm, Patient Position: Sitting, Cuff Size: Extra-Large)   Pulse 89   Temp (!) 97 4 °F (36 3 °C) (Tympanic)   Wt 82 1 kg (181 lb)   SpO2 98% Comment: on O2  BMI 21 46 kg/m²          Physical Exam   Constitutional: He is oriented to person, place, and time  Thin  frail   HENT:   Head: Normocephalic and atraumatic  Right Ear: External ear normal    Left Ear: External ear normal    Eyes: Conjunctivae are normal  Pupils are equal, round, and reactive to light  Neck: Normal range of motion   Neck supple  Cardiovascular: Normal rate, regular rhythm and normal heart sounds  Pulmonary/Chest: Effort normal  He has no wheezes  He has no rales  Dim bs  On O2   Abdominal: Soft  Slight lt sided tenderness   Musculoskeletal: Normal range of motion  Neurological: He is alert and oriented to person, place, and time  Skin: Skin is warm  Psychiatric: He has a normal mood and affect   His behavior is normal

## 2018-04-24 ENCOUNTER — OFFICE VISIT (OUTPATIENT)
Dept: PULMONOLOGY | Facility: MEDICAL CENTER | Age: 73
End: 2018-04-24
Payer: MEDICARE

## 2018-04-24 VITALS
BODY MASS INDEX: 21.55 KG/M2 | WEIGHT: 177 LBS | HEIGHT: 76 IN | TEMPERATURE: 98.2 F | HEART RATE: 86 BPM | DIASTOLIC BLOOD PRESSURE: 80 MMHG | RESPIRATION RATE: 18 BRPM | SYSTOLIC BLOOD PRESSURE: 140 MMHG | OXYGEN SATURATION: 97 %

## 2018-04-24 DIAGNOSIS — I26.09 OTHER CHRONIC PULMONARY EMBOLISM WITH ACUTE COR PULMONALE (HCC): ICD-10-CM

## 2018-04-24 DIAGNOSIS — E88.01 AAT (ALPHA-1-ANTITRYPSIN) DEFICIENCY (HCC): ICD-10-CM

## 2018-04-24 DIAGNOSIS — J43.2 CENTRILOBULAR EMPHYSEMA (HCC): ICD-10-CM

## 2018-04-24 DIAGNOSIS — I27.82 OTHER CHRONIC PULMONARY EMBOLISM WITH ACUTE COR PULMONALE (HCC): ICD-10-CM

## 2018-04-24 DIAGNOSIS — R91.8 LUNG NODULES: ICD-10-CM

## 2018-04-24 DIAGNOSIS — R91.1 LUNG NODULE: Primary | ICD-10-CM

## 2018-04-24 DIAGNOSIS — J96.11 CHRONIC HYPOXEMIC RESPIRATORY FAILURE (HCC): ICD-10-CM

## 2018-04-24 PROBLEM — J20.8 ACUTE BRONCHITIS DUE TO OTHER SPECIFIED ORGANISMS: Status: RESOLVED | Noted: 2018-04-02 | Resolved: 2018-04-24

## 2018-04-24 PROBLEM — J44.9 MODERATE COPD (CHRONIC OBSTRUCTIVE PULMONARY DISEASE) (HCC): Status: RESOLVED | Noted: 2017-12-30 | Resolved: 2018-04-24

## 2018-04-24 PROBLEM — D49.1 LUNG NEOPLASM: Status: RESOLVED | Noted: 2018-02-14 | Resolved: 2018-04-24

## 2018-04-24 PROCEDURE — 99214 OFFICE O/P EST MOD 30 MIN: CPT | Performed by: INTERNAL MEDICINE

## 2018-04-24 NOTE — PROGRESS NOTES
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  Relevant Medications    Umeclidinium Bromide 62 5 MCG/INH AEPB       Respiratory    Pulmonary embolism (HCC)     Светлана Mas was diagnosed with pulmonary embolism primary affecting the left lower lobe pulmonary artery  This was back in mid February 2018  Venous Doppler studies lower extremities at that time were negative for acute DVT  Possible trigger for this was his inactivity due to his persistent COPD exacerbation from RSV infection in January       Most recent CT scan in late March showed that the pulmonary emboli have resolved  However as he still not very active I will continue the Eliquis for at least 6 months         Chronic hypoxemic respiratory failure (HCC)     Continue oxygen 3 liters/minute continuous  With walking 50 ft today his O2 saturation stayed over 90%         Centrilobular emphysema (HCC)     Severe COPD  He has had recent acute exacerbations at least twice this year ever since he developed acute RSV respiratory infection in January  He has had slow improvement since then and spirometry done most recently showed an FEV1 of 1 6 L or 40% of predicted  He had completed a course of prednisone therapy and also in-patient rehab  He slowly is improving but still gets short of breath with minimal exertional activity  He will continue with Incruse 1 puff daily and Advair 250 mcg 1 puff b i d  He will continue theophylline 300 mg once a day  He has some slight increased reflux which may not be related to that  If this becomes a problem for him then at home I would consider stopping this medication as theophylline can relax of lower esophageal sphincter tone and worsening GERD    He is talking omeprazole 40 mg and will try taking it twice a day for a while  He is now off the prednisone therapy             Relevant Medications    Umeclidinium Bromide 62 5 MCG/INH AEPB       Other    Lung nodules     Camilo has 2 small lung nodules; 1 in the right upper lobe in the other left lower lobe which may be inflammatory  I have ordered a follow-up CT of the chest to be done in late June of this year to ensure there is no increase in the size of these nodules  Other Visit Diagnoses     Lung nodule    -  Primary    Relevant Orders    CT chest without contrast            Return in about 2 months (around 7/2/2018)  All questions are answered to the patient's satisfaction and understanding  He verbalizes understanding  He is encouraged to call with any further questions or concerns  Portions of the record may have been created with voice recognition software  Occasional wrong word or "sound a like" substitutions may have occurred due to the inherent limitations of voice recognition software  Read the chart carefully and recognize, using context, where substitutions have occurred  ______________________________________________________________________    Chief Complaint:   Chief Complaint   Patient presents with   [facility]     Shortness of Breath    Cough     non productive    Fatigue       Patient ID: Yovany Salgado is a 75 y o  y o  male has a past medical history of Anesthesia complication; Arthritis; BPH (benign prostatic hyperplasia); Cancer [facility]); COPD (chronic obstructive pulmonary disease) ([facility] [address] ); Full dentures; Hypertension; Kidney stone; Liver disease; Pulmonary emphysema ([facility] [address] ); and Wears glasses  4/24/2018  HPI     Yovany Salgado is doing better since his recent discharge from the hospital on April 5th  He was admitted for acute exacerbation of COPD and was hospitalized from March 29th to April 4, 2018    He is gradually improving and does have less exertional shortness of breath than prior to his hospitalization  He was discharged from short-term rehab and has been home for several days now  He did receive his alpha-1 trypsin replacement therapy last week on Wednesday and again this week  He is using oxygen 3 liters/minute on a continuous basis  He is on Advair 250 mcg 1 puff twice a day and Incruse 1 puff daily  He uses nebulizer with albuterol or albuterol inhaler as needed  He is not having much in way of any cough or wheezing  He does admit to some periodic gastric reflux which may be slightly more prominent since being started on theophylline  He states he had gastric reflux before his hospitalization before he was started on theophylline  He is now on 300 mg per day started during his most recent hospitalization  He is not having leg edema  Last spirometry done recently FEV1 of 1 6 L or 40% predicted    He denies any hemoptysis or weight loss  Jennifer Lombardo has not fully recovered since he had acute RSV infection and COPD exacerbation in January of this year  He does have history of right upper lobe and left lower lobe lung nodule which I suspect are inflammatory  He also has history of pulmonary emboli diagnosed earlier this year in mid February when he was having shortness of breath  He has been on Eliquis since then  Venous Doppler studies of lower extremities at that time not reveal any evidence of DVT  He is not having leg edema and has been tolerating the Eliquis  He was initially diagnosed with alpha 1 antitrypsin deficiency in 2002 and receives  IV replacement therapy since then  His phenotype is PiSZ  He quit smoking August 2013 but smoked 1 pack per day for about 50 years or more        Review of Systems   Constitutional: Negative for activity change, chills and fever  HENT: Negative for congestion and rhinorrhea  Eyes: Negative for redness  Respiratory: Positive for shortness of breath  Negative for cough and wheezing      Cardiovascular: Negative for leg swelling  Gastrointestinal: Negative for diarrhea and nausea  Endocrine: Negative for polydipsia and polyphagia  Genitourinary: Negative for dysuria  Musculoskeletal: Negative for myalgias  Neurological: Negative for light-headedness  Psychiatric/Behavioral: Negative for confusion  Smoking history: He reports that he quit smoking about 7 months ago  He has a 60 00 pack-year smoking history   He has never used smokeless tobacco     The following portions of the patient's history were reviewed and updated as appropriate: allergies, current medications, past family history, past medical history, past social history, past surgical history and problem list     Immunization History   Administered Date(s) Administered    Influenza TIV (IM) 09/27/2013, 10/23/2014    Pneumococcal Conjugate 13-Valent 04/26/2016    Pneumococcal Polysaccharide PPV23 04/23/2011    Tdap 04/26/2016    Zoster 10/29/2014, 04/27/2015     Current Outpatient Prescriptions   Medication Sig Dispense Refill    albuterol (2 5 mg/3 mL) 0 083 % nebulizer solution Inhale 1 each every 4 (four) hours as needed      albuterol (PROVENTIL HFA,VENTOLIN HFA) 90 mcg/act inhaler Inhale 2 puffs every 6 (six) hours as needed for wheezing 1 Inhaler 0    Alpha1-Proteinase Inhibitor (ZEMAIRA IV) Infuse into a venous catheter once a week On tuesday -1000mg /50 ml       amLODIPine (NORVASC) 10 mg tablet Take 1 tablet (10 mg total) by mouth daily 30 tablet 0    apixaban (ELIQUIS) 5 mg Take 1 tablet (5 mg total) by mouth 2 (two) times a day 60 tablet 0    fluticasone (FLONASE) 50 mcg/act nasal spray       fluticasone-salmeterol (ADVAIR DISKUS) 250-50 mcg/dose inhaler Inhale      meclizine (ANTIVERT) 12 5 MG tablet Take 1 tablet by mouth daily      omeprazole (PriLOSEC) 40 MG capsule       tamsulosin (FLOMAX) 0 4 mg Take 0 4 mg by mouth daily at bedtime      theophylline (ESTEFANIA-24) 400 MG 24 hr capsule Take 1 capsule (400 mg total) by mouth daily 90 capsule 3    Umeclidinium Bromide 62 5 MCG/INH AEPB Inhale 1 puff daily       zolpidem (AMBIEN CR) 6 25 MG CR tablet Take 10 mg by mouth daily at bedtime as needed for sleep      predniSONE 10 mg tablet Take 40mg for 3 days then 30mg x3 days, 20mg x3 days, then 10mg x3days then stop 30 tablet 0     No current facility-administered medications for this visit  Allergies: Chantix [varenicline]    Objective:  Vitals:    04/24/18 0843   BP: 140/80   BP Location: Left arm   Patient Position: Sitting   Cuff Size: Standard   Pulse: 86   Resp: 18   Temp: 98 2 °F (36 8 °C)   TempSrc: Oral   SpO2: 97%   Weight: 80 3 kg (177 lb)   Height: 6' 4" (1 93 m)   Oxygen Therapy  SpO2: 97 %    Wt Readings from Last 3 Encounters:   04/24/18 80 3 kg (177 lb)   04/19/18 82 1 kg (181 lb)   03/29/18 81 2 kg (179 lb)     Body mass index is 21 55 kg/m²  Physical Exam   Constitutional: He is oriented to person, place, and time  Thin male  No conversational dyspnea  On 3 L O2 saturation is 97%  After ambulating 50 ft O2 saturation dropped slightly to 95% on 3 L   HENT:   Head: Normocephalic  Nose: Nose normal    Mouth/Throat: Oropharynx is clear and moist  No oropharyngeal exudate  Eyes: Conjunctivae are normal  Pupils are equal, round, and reactive to light  No scleral icterus  Neck: Neck supple  No JVD present  No tracheal deviation present  Cardiovascular: Normal rate, regular rhythm and normal heart sounds  Pulmonary/Chest: He has no wheezes  He has no rales  Lung sounds are decreased but clear to auscultation   Abdominal: Soft  There is no tenderness  There is no guarding  Musculoskeletal: He exhibits no edema  Lymphadenopathy:     He has no cervical adenopathy  Neurological: He is alert and oriented to person, place, and time  Skin: Skin is warm and dry  No erythema  Psychiatric: He has a normal mood and affect   His behavior is normal        Xr Chest Portable    Result Date: 3/29/2018  Narrative: CHEST INDICATION:   sob  Worsening symptoms  COMPARISON:  2/14/2018 EXAM PERFORMED/VIEWS:  XR CHEST PORTABLE FINDINGS: Cardiomediastinal silhouette appears unremarkable  There is a pleural-based bulla identified within the right lung base with mild right basilar scarring  No pneumonia  No effusions  No pneumothorax  Slightly hyperinflated lung fields suggesting underlying COPD  Osseous structures appear within normal limits for patient age  Impression: Stable chronic changes within the lung fields  No pneumonia or congestive failure  Workstation performed: [id number]     Cta Chest Pe Study    Result Date: 3/30/2018  Narrative: CTA - CHEST WITH IV CONTRAST - PULMONARY ANGIOGRAM INDICATION:   Reassess PE present due to worsening SOB & WALKER  COMPARISON: 3/12/2018  TECHNIQUE: CTA examination of the chest was performed using angiographic technique according to a protocol specifically tailored to evaluate for pulmonary embolism  Axial, sagittal, and coronal 2D reformatted images were created from the source data and  submitted for interpretation  In addition, coronal 3D MIP postprocessing was performed on the acquisition scanner  Radiation dose length product (DLP) for this visit:  508  37 mGy-cm   This examination, like all CT scans performed in the [facility], was performed utilizing techniques to minimize radiation dose exposure, including the use of iterative  reconstruction and automated exposure control  IV Contrast:  85 mL of iohexol (OMNIPAQUE)  FINDINGS: PULMONARY ARTERIAL TREE:  No pulmonary embolus is seen  LUNGS:  Severe COPD with scattered regions of pulmonary scarring  Bleb noted at the lung base on the right  Right apical irregular nodule on 3/17 measuring up to 9 mm was FDG positive on PET CT dated 2/12/2018 concerning for malignancy  Left basilar 8  x 5 mm nodule on 3/49 did not demonstrate significant uptake on recent PET CT  PLEURA:  Unremarkable  HEART/AORTA:  Unremarkable for patient's age  MEDIASTINUM AND MIKE:  Unremarkable  CHEST WALL AND LOWER NECK:   Unremarkable  VISUALIZED STRUCTURES IN THE UPPER ABDOMEN:  Unremarkable  OSSEOUS STRUCTURES:  No acute fracture or destructive osseous lesion  Impression: 1  No PE  Previously noted left lower lobe filling defect has resolved during the interim  2   Severe COPD with scattered regions of pulmonary scarring  3   Stable right apical and left basilar pulmonary nodules  The right apical nodule was FDG avid on recent PET/CT dated 2/12/2018 consistent with malignancy  Please refer to that report  Per previous reports, tissue sampling of the right upper lobe nodule is currently planned   Workstation performed: [id number]

## 2018-04-24 NOTE — PATIENT INSTRUCTIONS
CT scan of Chest no contrast in late June and follow up visit after that  Stay on Eliquis until next visit and we will reassess  Take Omeprazole twice a day for next week  If gastric reflux does not improve, contact our office and we will reconsider Theophylline

## 2018-04-25 NOTE — ASSESSMENT & PLAN NOTE
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

## 2018-04-25 NOTE — ASSESSMENT & PLAN NOTE
Lebron Lee has 2 small lung nodules; 1 in the right upper lobe in the other left lower lobe which may be inflammatory  I have ordered a follow-up CT of the chest to be done in late June of this year to ensure there is no increase in the size of these nodules

## 2018-04-25 NOTE — ASSESSMENT & PLAN NOTE
Continue oxygen 3 liters/minute continuous    With walking 50 ft today his O2 saturation stayed over 90%

## 2018-04-25 NOTE — ASSESSMENT & PLAN NOTE
[address] [facility] was diagnosed with pulmonary embolism primary affecting the left lower lobe pulmonary artery  This was back in mid February 2018  Venous Doppler studies lower extremities at that time were negative for acute DVT  Possible trigger for this was his inactivity due to his persistent COPD exacerbation from RSV infection in January       Most recent CT scan in late March showed that the pulmonary emboli have resolved    However as he still not very active I will continue the Eliquis for at least 6 months

## 2018-04-25 NOTE — ASSESSMENT & PLAN NOTE
Severe COPD  He has had recent acute exacerbations at least twice this year ever since he developed acute RSV respiratory infection in January  He has had slow improvement since then and spirometry done most recently showed an FEV1 of 1 6 L or 40% of predicted  He had completed a course of prednisone therapy and also in-patient rehab  He slowly is improving but still gets short of breath with minimal exertional activity  He will continue with Incruse 1 puff daily and Advair 250 mcg 1 puff b i d  He will continue theophylline 300 mg once a day  He has some slight increased reflux which may not be related to that  If this becomes a problem for him then at home I would consider stopping this medication as theophylline can relax of lower esophageal sphincter tone and worsening GERD  He is talking omeprazole 40 mg and will try taking it twice a day for a while    He is now off the prednisone therapy

## 2018-04-27 NOTE — PROGRESS NOTES
Cardiac/Pulmonary Rehabilitation Plan of Care    90 day      Today's date: 2018   Visits: 11  Patient name: Valorie Buchanan      : 1945  Age: 75 y o  MRN: [id number]  Referring Physician: Neto Pavon MD  Provider: [facility]  Clinician: Alpesh Garay RN    Dx:   Encounter Diagnosis   Name Primary?  Moderate COPD (chronic obstructive pulmonary disease) (HCC) Yes     Date of onset: 2004    Comments: Mr Miah Ramos has not returned to pulmonary rehabilitation since his 6 session on 3 /  No change to previous 30 day POC  Medication compliance: Yes   Comments: none  Fall Risk: Yes   Comments: Oxygen tubing, and dyspnea with minimal exertion    EXERCISE/ACTIVITY    Cardiovascular:   Min: 56   METS: 2 1   Hr: 107   RPE: 7   O2 sat: 93-97   Modalities: Treadmill, AD bike, UBE, NuStep and Recumbent bike  Strength trainin-3 days / week, 12-15 repitations  and 1-2 sets per modality    Modalities: Arm curl and upright rows, shrugs lateral and frontal raise  EKG changes: N/A  Dyspnea score: 7  Home activity: none  Goals: Increase endurance and strength to return to the club house to play pool, ping pong and shuffle board  Increase endurance to walk a longer distance than 995 feet in 6 minutes in 12 weeks  Education: explained pulmonary rehabilitation and how to use cardiovascular equipment, discussed relaxation breathing  Plan: treadmill, 1 5 mph with a 2 % incline 15 minutes, recumbent bike 15 minutes level 2 or airdyne bike 10 minutes, arm ergometer 5 to 10 minutes level 2, nu-step for 15 minutes level 4   Increase time and resistance as tolerated  Readiness to change: 10    NUTRITION    Weight control:    Starting weight: 176 pounds   Current weight: 177 5  Waist circumference:    Startin 5 inches   Current:    Diabetes: none  Lipid management: none  Goals: choose lower fat beef products and processed foods  Education: rate your plate, discussed sodium and fat in foods  Plan: decreased the amount of fat and processed food in diet  Readiness to change: 2    PSYCHOSOCIAL    Emotional:    Self-reported stress level: 5   Social support: daughter  Goals: improve depression by increasing endurance to return to activities at the clubhouse  Education: none  Plan: provide positive encouragement and relaxation techniques  Readiness to change: 5    OTHER CORE COMPONENTS     Tobacco:   History   Smoking Status    Former Smoker    Packs/day: 1 00    Years: 60 00    Quit date: 8/31/2017   Smokeless Tobacco    Never Used     Comment: quit in august 2017     Blood pressure:    Resting: Resting BP: 154/80   Exercise: Recovery BP: 126/70  Goals: none  Education: none  Plan: none  Readiness to change: 1   CAT score 26  Comments: On 2/14/2018 Pt was very short of breath during Pulmonary Rehabilitation states he is short of breath RPD 4  Contacted Dr Lionel Pettit office and they informed him to go to ER for evaluation  Pt refuse squad at first, stated he will drive himself  O2 Sat 99% on 3l/min via nc  Audible wheeze  He was transferred to [address] [place] [address] Via ambulance  HE stated he had a Pulmonary embolism, was hospitalized from 2/14/2018 to 2/18/2018  He returned to pulmonary rehabilitation on 2/23/18  He completes 44 minutes of cardiovascular exercise with a light weight strength training component  He continues to wear supplemental oxygen at 3l/min at rest and with exercise  His exercise MET level increased from 1 9 to 2 1 MET's  RPE 2-5 RPE 3 to 7  Audible wheezing at rest and with exercise  On 3/19/2018 he was very hypertensive and sent to MD office for evaluation  He missed several session due to hypertension  Attends weekly educational sessions  Will continue to monitor and increase workload as tolerated

## 2018-05-01 ENCOUNTER — OFFICE VISIT (OUTPATIENT)
Dept: HEMATOLOGY ONCOLOGY | Facility: MEDICAL CENTER | Age: 73
End: 2018-05-01
Payer: MEDICARE

## 2018-05-01 VITALS
SYSTOLIC BLOOD PRESSURE: 142 MMHG | HEIGHT: 76 IN | HEART RATE: 97 BPM | DIASTOLIC BLOOD PRESSURE: 70 MMHG | TEMPERATURE: 98.4 F | WEIGHT: 180 LBS | BODY MASS INDEX: 21.92 KG/M2 | RESPIRATION RATE: 18 BRPM | OXYGEN SATURATION: 98 %

## 2018-05-01 DIAGNOSIS — I27.82 OTHER CHRONIC PULMONARY EMBOLISM WITH ACUTE COR PULMONALE (HCC): Primary | ICD-10-CM

## 2018-05-01 DIAGNOSIS — I26.09 OTHER CHRONIC PULMONARY EMBOLISM WITH ACUTE COR PULMONALE (HCC): Primary | ICD-10-CM

## 2018-05-01 PROCEDURE — 99214 OFFICE O/P EST MOD 30 MIN: CPT | Performed by: INTERNAL MEDICINE

## 2018-05-01 NOTE — PROGRESS NOTES
[facility]  1945  [address] [facility] [address] [place] [facility] [id number]    DISCUSSION  SUMMARY:    65-year-old male recently  the hospital with chest pain  Workup demonstrated an acute pulmonary emboli within the distal left main pulmonary artery and proximal left lower lobe segmental branches  Issues:    1  Pulmonary embolism  Patient feels better but still has shortness of breath and dyspnea on exertion and needs constant oxygen  Mr  [facility] is to continue with the eliquis  We discussed what to monitor for is far as acute respiratory issues as well as excessive bruising/bleeding  The plan is 6 months of treatment  2  Pulmonary nodules, emphysema  Pulmonary follow-up is ongoing  Repeat CT scan is pending for 2 months  No plan for biopsy at this time  3  Alpha-1 antitrypsin deficiency  Recent alpha-fetoprotein level was within normal limits  Surveillance is ongoing  Patient is on weekly IV alpha 1 proteinase inhibitor  4, Urinary issues  Patient has been referred to Urology for evaluation  Patient is to return in 3 months  Patient knows to call the hematology/oncology office if there are any other questions or concerns  Carefully review your medication list and verify that the list is accurate and up-to-date  Please call the hematology/oncology office if there are medications missing from the list, medications on the list that you are not currently taking or if there is a dosage or instruction that is different from how you're taking that medication  Patient goals and areas of care: continue with anticoagulation  Patient is able to self-care   _____________________________________________________________________________________    Chief Complaint   Patient presents with    Follow-up     Pulmonary embolism     History of Present Illness:    65-year-old male recently admitted to the hospital with chest pain   Workup demonstrated PE  Patient was started on anticoagulation, now on eliquis  Mr Rylan Broderick returns for follow-up  Patient states feeling okay, better than before but still with shortness of breath and dyspnea on exertion  No chest pain  Dry cough is the same as before; no sputum or hemoptysis  Patient is on continuous oxygen  No fevers, chills or sweats  Appetite is okay, weight is stable  Activities are limited but no different than before  Patient denies any bleeding but bruises easily  Mr Rylan Broderick has had recent issues with difficulty to void as well as leaking especially at night  Patient denies any hematuria  Review of Systems   Constitutional: Negative  HENT: Negative  Eyes: Negative  Respiratory: Positive for cough and shortness of breath  Cardiovascular: Negative  Gastrointestinal: Negative  Endocrine: Negative  Genitourinary: Positive for difficulty urinating and urgency  Musculoskeletal: Negative  Skin: Negative  Allergic/Immunologic: Negative  Neurological: Negative  Hematological: Bruises/bleeds easily  Psychiatric/Behavioral: Negative  All other systems reviewed and are negative       Patient Active Problem List   Diagnosis    AAT (alpha-1-antitrypsin) deficiency ([facility] )    Causalgia of lower limb    Unspecified essential hypertension    Gastroesophageal reflux disease without esophagitis    Dyspnea    HTN (hypertension)    BPH (benign prostatic hyperplasia)    Liver disease    Lung nodules    Former smoker    Chest pain at rest    Pulmonary embolism [facility])   [facility] discharge follow-up    Chronic hypoxemic respiratory failure ([facility] )    Centrilobular emphysema ([facility] )     Past Medical History:   Diagnosis Date    Anesthesia complication     Difficult to wake up    Arthritis     BPH (benign prostatic hyperplasia)     urinary frequency    Cancer (HCC)     basal cell neck, face    COPD (chronic obstructive pulmonary disease) ([address] )     Full dentures  Hypertension     controlled    Kidney stone     at least 7 episodes    Liver disease     Alpha 1- enzyme deficiency - diagnosed 2002  has been on weekly replacement therapy since then    Pulmonary emphysema ([facility] [address] )     1/25/15  FEV1 - 2 45 liters or 59% of predicted    Wears glasses     for driving only     Past Surgical History:   Procedure Laterality Date    BACK SURGERY  2008    discectomy    COLONOSCOPY      COLONOSCOPY N/A 3/10/2017    Procedure: Marene Angry;  Surgeon: Mary Lara MD;  Location: [place] [address] GI LAB; Service:    Radha Sheehan      removal of kidney stones    ESOPHAGOGASTRODUODENOSCOPY N/A 3/10/2017    Procedure: ESOPHAGOGASTRODUODENOSCOPY (EGD); Surgeon: Mary Lara MD;  Location: [facility] GI [facility]; Service:     LITHOTRIPSY      TONSILLECTOMY      VEIN LIGATION AND STRIPPING Bilateral     18's     Family History   Problem Relation Age of Onset    Emphysema Mother      never smoked    Emphysema Father     Cancer Brother      colon    Colon cancer Brother     Ulcerative colitis Family     Liver disease Family      Social History     Social History    Marital status:      Spouse name: N/A    Number of children: N/A    Years of education: N/A     Occupational History    Not on file       Social History Main Topics    Smoking status: Former Smoker     Packs/day: 1 00     Years: 60 00     Quit date: 8/31/2017    Smokeless tobacco: Never Used      Comment: quit in august 2017    Alcohol use No    Drug use: No    Sexual activity: Not on file     Other Topics Concern    Not on file     Social History Narrative    No narrative on file       Current Outpatient Prescriptions:     albuterol (2 5 mg/3 mL) 0 083 % nebulizer solution, Inhale 1 each every 4 (four) hours as needed, Disp: , Rfl:     albuterol (PROVENTIL HFA,VENTOLIN HFA) 90 mcg/act inhaler, Inhale 2 puffs every 6 (six) hours as needed for wheezing, Disp: 1 Inhaler, Rfl: 0    Alpha1-Proteinase Inhibitor (Noemi Barros IV), Infuse into a venous catheter once a week On tuesday -1000mg /50 ml , Disp: , Rfl:     amLODIPine (NORVASC) 10 mg tablet, Take 1 tablet (10 mg total) by mouth daily, Disp: 30 tablet, Rfl: 0    apixaban (ELIQUIS) 5 mg, Take 1 tablet (5 mg total) by mouth 2 (two) times a day, Disp: 60 tablet, Rfl: 0    fluticasone (FLONASE) 50 mcg/act nasal spray, , Disp: , Rfl:     fluticasone-salmeterol (ADVAIR DISKUS) 250-50 mcg/dose inhaler, Inhale, Disp: , Rfl:     meclizine (ANTIVERT) 12 5 MG tablet, Take 1 tablet by mouth daily, Disp: , Rfl:     omeprazole (PriLOSEC) 40 MG capsule, , Disp: , Rfl:     tamsulosin (FLOMAX) 0 4 mg, Take 0 4 mg by mouth daily at bedtime, Disp: , Rfl:     theophylline (ESTEFANIA-24) 400 MG 24 hr capsule, Take 1 capsule (400 mg total) by mouth daily, Disp: 90 capsule, Rfl: 3    Umeclidinium Bromide 62 5 MCG/INH AEPB, Inhale 1 puff daily , Disp: , Rfl:     zolpidem (AMBIEN CR) 6 25 MG CR tablet, Take 10 mg by mouth daily at bedtime as needed for sleep, Disp: , Rfl:     predniSONE 10 mg tablet, Take 40mg for 3 days then 30mg x3 days, 20mg x3 days, then 10mg x3days then stop, Disp: 30 tablet, Rfl: 0    Allergies   Allergen Reactions    Chantix [Varenicline]      Caused prostate infection       Vitals:    05/01/18 0906   BP: 142/70   Pulse: 97   Resp: 18   Temp: 98 4 °F (36 9 °C)   SpO2: 98%     Physical Exam   Constitutional: He is oriented to person, place, and time  He appears well-developed and well-nourished  Thin male, mild respiratory distress, nasal cannula O2 in place   HENT:   Head: Normocephalic and atraumatic  Right Ear: External ear normal    Left Ear: External ear normal    Nose: Nose normal    Mouth/Throat: Oropharynx is clear and moist    Eyes: Conjunctivae and EOM are normal  Pupils are equal, round, and reactive to light  Neck: Normal range of motion  Neck supple  Cardiovascular: Normal rate, regular rhythm, normal heart sounds and intact distal pulses  Pulmonary/Chest:   Decreased breath sounds bilaterally, otherwise clear   Abdominal: Soft  Bowel sounds are normal    Musculoskeletal: Normal range of motion  Neurological: He is alert and oriented to person, place, and time  He has normal reflexes  Skin: Skin is warm  Skin with relatively good color, scattered ecchymoses, no petechiae   Psychiatric: He has a normal mood and affect  His behavior is normal  Judgment and thought content normal    Extremities: no edema, no cords, pulses are 1+  Lymphatics: no adenopathy in the neck, supraclavicular region, axilla and groin bilaterally    Labs: Alpha 1 antitrypsin = 74 = decreased   4/4/18 WBC = 11 hemoglobin = 13 2 hematocrit = 38 7 platelet = 699 neutrophil = 89%  2/18/18 BUN = 18 creatinine = 1 17 WBC = 5 9 hemoglobin = 12 1 hematocrit = 36 platelet = 622  7/91/67 alpha-fetoprotein = 4 8    Imaging    2/14/18 CTA chest PE study     1   Acute pulmonary embolus in the distal left main pulmonary artery and proximal left lower lobe segmental branches  2   Moderate panlobular emphysema  3   Stable right upper and left lower pulmonary nodules measuring 1 2 x 1 3 cm  PET/CT was performed, based on current Fleischner Society 2017 Guidelines on incidental pulmonary nodule   Tissue sampling of the right upper lobe hypermetabolic nodule is   planned   3 month follow-up of left lower lobe pulmonary nodule as previously recommended      2/12/18 PET/CT     1   1 2 x 0 8 cm right upper lung nodule demonstrates hypermetabolism and is most concerning for malignancy   Tissue sampling recommended  2   1 3 x 0 6 cm left lower lung nodule does not demonstrate significant hypermetabolism   In the absence of tissue sampling, 3 month CT follow-up is recommended to exclude a low-grade neoplasm  3   No hypermetabolic metastases visualized

## 2018-05-17 ENCOUNTER — TRANSCRIBE ORDERS (OUTPATIENT)
Dept: ADMINISTRATIVE | Facility: HOSPITAL | Age: 73
End: 2018-05-17

## 2018-05-17 ENCOUNTER — APPOINTMENT (OUTPATIENT)
Dept: LAB | Facility: HOSPITAL | Age: 73
End: 2018-05-17
Attending: UROLOGY
Payer: MEDICARE

## 2018-05-17 ENCOUNTER — HOSPITAL ENCOUNTER (OUTPATIENT)
Dept: RADIOLOGY | Facility: HOSPITAL | Age: 73
Discharge: HOME | End: 2018-05-17
Attending: UROLOGY
Payer: MEDICARE

## 2018-05-17 DIAGNOSIS — N13.8 NODULAR PROSTATE WITH URINARY OBSTRUCTION: Primary | ICD-10-CM

## 2018-05-17 DIAGNOSIS — N13.8 NODULAR PROSTATE WITH URINARY OBSTRUCTION: ICD-10-CM

## 2018-05-17 DIAGNOSIS — N40.3 NODULAR PROSTATE WITH URINARY OBSTRUCTION: Primary | ICD-10-CM

## 2018-05-17 DIAGNOSIS — N40.3 NODULAR PROSTATE WITH URINARY OBSTRUCTION: ICD-10-CM

## 2018-05-17 LAB — PSA SERPL-MCNC: 4.8 NG/ML (ref 0–4)

## 2018-05-17 PROCEDURE — 74018 RADEX ABDOMEN 1 VIEW: CPT

## 2018-05-17 PROCEDURE — 84153 ASSAY OF PSA TOTAL: CPT

## 2018-05-22 NOTE — PROGRESS NOTES
Pulmonary Rehabilitation Plan of Care                                                                                                                                Discharge Report    Today's date:   Visits: 11  Patient name: Selina Perera                                        : 1945  Age: 75 y o  MRN: [id number]  Referring Physician: Johny Ackerman MD  Provider: [facility]  Clinician: Dipti Conrad RN     Dx:        Encounter Diagnosis   Name Primary?  Moderate COPD (chronic obstructive pulmonary disease) (HCC) Yes      Date of onset: 2004     Comments: Mr Marcio Justin has been discharged from the pulmonary rehabilitation program  He has not returned to pulmonary rehabilitation since his 6 session on 3 /  No change to previous 30 day POC  Unable to complete out come assessment due to patient did not return to pulmonary rehabilitation      Medication compliance: Yes              Comments: none  Fall Risk: Yes              Comments: Oxygen tubing, and dyspnea with minimal exertion     EXERCISE/ACTIVITY     Cardiovascular:              Min: 56              METS: 2 1              Hr: 107              RPE: 7              O2 sat: 93-97              Modalities: Treadmill, AD bike, UBE, NuStep and Recumbent bike  Strength trainin-3 days / week, 12-15 repitations  and 1-2 sets per modality               Modalities: Arm curl and upright rows, shrugs lateral and frontal raise  EKG changes: N/A  Dyspnea score: 7  Home activity: none  Goals:  Increase endurance and strength to return to the club house to play pool, ping pong and shuffle board  Increase endurance to walk a longer distance than 995 feet in 6 minutes in 12 weeks  Education: explained pulmonary rehabilitation and how to use cardiovascular equipment, discussed relaxation breathing  Plan: treadmill, 1 5 mph with a 2 % incline 15 minutes, recumbent bike 15 minutes level 2 or airdyne bike 10 minutes, arm ergometer 5 to 10 minutes level 2, nu-step for 15 minutes level 4  Increase time and resistance as tolerated  Readiness to change: 10     NUTRITION     Weight control:               Starting weight: 176 pounds              Current weight: 177 5  Waist circumference:               Startin 5 inches              Current:    Diabetes: none  Lipid management: none  Goals: choose lower fat beef products and processed foods  Education: rate your plate, discussed sodium and fat in foods  Plan: decreased the amount of fat and processed food in diet  Readiness to change: 2     PSYCHOSOCIAL     Emotional:    Self-reported stress level: 5   Social support: daughter  Goals: improve depression by increasing endurance to return to activities at the clubhouse  Education: none  Plan: provide positive encouragement and relaxation techniques  Readiness to change: 5     OTHER CORE COMPONENTS      Tobacco:         History   Smoking Status    Former Smoker    Packs/day: 1 00    Years: 60 00    Quit date: 2017   Smokeless Tobacco    Never Used       Comment: quit in 2017      Blood pressure:               Resting: Resting BP: 154/80              Exercise: Recovery BP: 126/70  Goals: none  Education: none  Plan: none  Readiness to change: 1   CAT score 26  Comments: On 2018 Pt was very short of breath during Pulmonary Rehabilitation states he is short of breath RPD 4  Contacted Dr Lakhwinder Mackey office and they informed him to go to ER for evaluation  Pt refuse squad at first, stated he will drive himself  O2 Sat 99% on 3l/min via nc  Audible wheeze  He was transferred to [address] [place] [address] Via ambulance  HE stated he had a Pulmonary embolism, was hospitalized from 2018 to 2018  He returned to pulmonary rehabilitation on 18   He completes 44 minutes of cardiovascular exercise with a light weight strength training component  He continues to wear supplemental oxygen at 3l/min at rest and with exercise  His exercise MET level increased from 1 9 to 2 1 MET's  RPE 2-5 RPE 3 to 7  Audible wheezing at rest and with exercise  On 3/19/2018 he was very hypertensive and sent to MD office for evaluation  He missed several session due to hypertension  Attends weekly educational sessions  Will continue to monitor and increase workload as tolerated

## 2018-05-24 ENCOUNTER — OFFICE VISIT (OUTPATIENT)
Dept: FAMILY MEDICINE CLINIC | Facility: CLINIC | Age: 73
End: 2018-05-24
Payer: MEDICARE

## 2018-05-24 VITALS
RESPIRATION RATE: 16 BRPM | HEIGHT: 77 IN | HEART RATE: 102 BPM | SYSTOLIC BLOOD PRESSURE: 120 MMHG | OXYGEN SATURATION: 95 % | WEIGHT: 180 LBS | DIASTOLIC BLOOD PRESSURE: 58 MMHG | BODY MASS INDEX: 21.25 KG/M2

## 2018-05-24 DIAGNOSIS — J96.01 ACUTE HYPOXEMIC RESPIRATORY FAILURE (HCC): ICD-10-CM

## 2018-05-24 DIAGNOSIS — J43.2 CENTRILOBULAR EMPHYSEMA (HCC): ICD-10-CM

## 2018-05-24 DIAGNOSIS — I10 ESSENTIAL HYPERTENSION: Primary | ICD-10-CM

## 2018-05-24 DIAGNOSIS — I26.09 OTHER CHRONIC PULMONARY EMBOLISM WITH ACUTE COR PULMONALE (HCC): ICD-10-CM

## 2018-05-24 DIAGNOSIS — R42 LIGHTHEADEDNESS: ICD-10-CM

## 2018-05-24 DIAGNOSIS — I27.82 OTHER CHRONIC PULMONARY EMBOLISM WITH ACUTE COR PULMONALE (HCC): ICD-10-CM

## 2018-05-24 DIAGNOSIS — R91.8 LUNG NODULES: ICD-10-CM

## 2018-05-24 PROCEDURE — 99214 OFFICE O/P EST MOD 30 MIN: CPT | Performed by: FAMILY MEDICINE

## 2018-05-24 RX ORDER — ALPHA-1-PROTEINASE INHIBITOR HUMAN 1000 MG
KIT INTRAVENOUS
COMMUNITY
Start: 2018-05-09 | End: 2019-12-09 | Stop reason: HOSPADM

## 2018-05-24 RX ORDER — AMLODIPINE BESYLATE 5 MG/1
5 TABLET ORAL DAILY
Qty: 30 TABLET | Refills: 3 | Status: SHIPPED | OUTPATIENT
Start: 2018-05-24 | End: 2018-11-19

## 2018-05-24 NOTE — PROGRESS NOTES
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 (HCC)    Lightheadedness    Other orders  -     ZEMAIRA injection;           Subjective:      Patient ID: Violet Brand is a 75 y o  male  Patient seen in f/u had seen pulm  But confused about tests ordered  Also having lightheadedness which he notes in am and in evening    Is on med for urine  Did see yurologist for prostate nodule  Needs refills on meds  Otherwise breathing stable        The following portions of the patient's history were reviewed and updated as appropriate: past family history, past medical history, past social history and past surgical history  Review of Systems   Constitutional: Positive for activity change and fatigue  HENT: Negative  Eyes: Negative  Respiratory:        See hpi   Cardiovascular: Negative  Gastrointestinal:        See hpi   Genitourinary:        See hpi   Musculoskeletal: Negative  Skin: Negative  Neurological: Positive for dizziness  Psychiatric/Behavioral: Negative  Objective:      /58 (BP Location: Left arm, Patient Position: Sitting)   Pulse 102   Resp 16   Ht 6' 5" (1 956 m)   Wt 81 6 kg (180 lb)   SpO2 95%   BMI 21 34 kg/m²          Physical Exam   Constitutional: He is oriented to person, place, and time  He appears well-developed and well-nourished  HENT:   Head: Normocephalic and atraumatic     Right Ear: External ear normal    Left Ear: External ear normal    Eyes: Conjunctivae are normal  Pupils are equal, round, and reactive to light  Cardiovascular: Normal rate, regular rhythm and normal heart sounds  Pulmonary/Chest: Effort normal  No respiratory distress  He has no wheezes  He has no rales  bs  dec   Abdominal: Soft  Bowel sounds are normal    Musculoskeletal: Normal range of motion  Neurological: He is alert and oriented to person, place, and time  Skin: Skin is warm  Psychiatric: He has a normal mood and affect   His behavior is normal

## 2018-05-30 ENCOUNTER — CLINICAL SUPPORT (OUTPATIENT)
Dept: FAMILY MEDICINE CLINIC | Facility: CLINIC | Age: 73
End: 2018-05-30
Payer: MEDICARE

## 2018-05-30 VITALS
DIASTOLIC BLOOD PRESSURE: 60 MMHG | OXYGEN SATURATION: 98 % | HEART RATE: 100 BPM | SYSTOLIC BLOOD PRESSURE: 162 MMHG | RESPIRATION RATE: 26 BRPM

## 2018-05-30 DIAGNOSIS — I10 ESSENTIAL HYPERTENSION: Primary | ICD-10-CM

## 2018-05-30 PROCEDURE — 99211 OFF/OP EST MAY X REQ PHY/QHP: CPT

## 2018-05-30 NOTE — PROGRESS NOTES
/60 spoke with Dr Jose Grimm patient requesting he call pulmonary about testing is confused what they want   Did get  CT without contast

## 2018-06-07 ENCOUNTER — TELEPHONE (OUTPATIENT)
Dept: FAMILY MEDICINE CLINIC | Facility: CLINIC | Age: 73
End: 2018-06-07

## 2018-06-07 NOTE — TELEPHONE ENCOUNTER
Priscilla Smith - said pt is experiencing chest pain, wont let her call 911 and wont call us himself    He stayed up all night from it, feels more like constant acid reflux, intermittent shooting pains

## 2018-06-11 DIAGNOSIS — R91.8 MULTIPLE LUNG NODULES: ICD-10-CM

## 2018-06-11 DIAGNOSIS — R06.00 DYSPNEA: Primary | ICD-10-CM

## 2018-06-11 DIAGNOSIS — R06.00 DYSPNEA, UNSPECIFIED TYPE: Primary | ICD-10-CM

## 2018-06-11 NOTE — PROGRESS NOTES
I spoke to occupational therapist was concerned about the patient's condition  He has been getting some lightheadedness now with activity and he still has shortness of breath with activity  He is on Eliquis  He has history of pulmonary embolism earlier this year  He was scheduled for follow-up CT of the chest tomorrow without contrast regarding lung nodules  I will modify this and changes to his CTA of the chest to also exclude any possibility of recurrent PE even though he is on Eliquis  He is very inactive due to his severe COPD

## 2018-06-12 ENCOUNTER — TRANSCRIBE ORDERS (OUTPATIENT)
Dept: ADMINISTRATIVE | Facility: HOSPITAL | Age: 73
End: 2018-06-12

## 2018-06-12 ENCOUNTER — APPOINTMENT (OUTPATIENT)
Dept: LAB | Facility: HOSPITAL | Age: 73
End: 2018-06-12
Payer: MEDICARE

## 2018-06-12 ENCOUNTER — HOSPITAL ENCOUNTER (OUTPATIENT)
Dept: RADIOLOGY | Facility: HOSPITAL | Age: 73
Discharge: HOME | End: 2018-06-12
Attending: INTERNAL MEDICINE
Payer: MEDICARE

## 2018-06-12 DIAGNOSIS — R06.00 DYSPNEA, UNSPECIFIED TYPE: ICD-10-CM

## 2018-06-12 DIAGNOSIS — R06.02 SHORTNESS OF BREATH: ICD-10-CM

## 2018-06-12 DIAGNOSIS — I26.99 OTHER ACUTE PULMONARY EMBOLISM WITHOUT ACUTE COR PULMONALE (HCC): ICD-10-CM

## 2018-06-12 LAB
ANION GAP SERPL CALCULATED.3IONS-SCNC: 9 MMOL/L (ref 4–13)
BASOPHILS # BLD AUTO: 0.07 THOUSANDS/ΜL (ref 0–0.1)
BASOPHILS NFR BLD AUTO: 1 % (ref 0–1)
BUN SERPL-MCNC: 11 MG/DL (ref 5–25)
CALCIUM SERPL-MCNC: 9.4 MG/DL (ref 8.3–10.1)
CHLORIDE SERPL-SCNC: 104 MMOL/L (ref 100–108)
CO2 SERPL-SCNC: 28 MMOL/L (ref 21–32)
CREAT SERPL-MCNC: 1.25 MG/DL (ref 0.6–1.3)
EOSINOPHIL # BLD AUTO: 0.3 THOUSAND/ΜL (ref 0–0.61)
EOSINOPHIL NFR BLD AUTO: 5 % (ref 0–6)
ERYTHROCYTE [DISTWIDTH] IN BLOOD BY AUTOMATED COUNT: 13.2 % (ref 11.6–15.1)
GFR SERPL CREATININE-BSD FRML MDRD: 56 ML/MIN/1.73SQ M
GLUCOSE P FAST SERPL-MCNC: 112 MG/DL (ref 65–99)
HCT VFR BLD AUTO: 41.6 % (ref 36.5–49.3)
HGB BLD-MCNC: 13.8 G/DL (ref 12–17)
IMM GRANULOCYTES # BLD AUTO: 0.03 THOUSAND/UL (ref 0–0.2)
IMM GRANULOCYTES NFR BLD AUTO: 1 % (ref 0–2)
LYMPHOCYTES # BLD AUTO: 1.79 THOUSANDS/ΜL (ref 0.6–4.47)
LYMPHOCYTES NFR BLD AUTO: 28 % (ref 14–44)
MCH RBC QN AUTO: 31.9 PG (ref 26.8–34.3)
MCHC RBC AUTO-ENTMCNC: 33.2 G/DL (ref 31.4–37.4)
MCV RBC AUTO: 96 FL (ref 82–98)
MONOCYTES # BLD AUTO: 0.56 THOUSAND/ΜL (ref 0.17–1.22)
MONOCYTES NFR BLD AUTO: 9 % (ref 4–12)
NEUTROPHILS # BLD AUTO: 3.63 THOUSANDS/ΜL (ref 1.85–7.62)
NEUTS SEG NFR BLD AUTO: 56 % (ref 43–75)
NRBC BLD AUTO-RTO: 0 /100 WBCS
PLATELET # BLD AUTO: 228 THOUSANDS/UL (ref 149–390)
PMV BLD AUTO: 9.5 FL (ref 8.9–12.7)
POTASSIUM SERPL-SCNC: 4.2 MMOL/L (ref 3.5–5.3)
RBC # BLD AUTO: 4.33 MILLION/UL (ref 3.88–5.62)
SODIUM SERPL-SCNC: 141 MMOL/L (ref 136–145)
WBC # BLD AUTO: 6.38 THOUSAND/UL (ref 4.31–10.16)

## 2018-06-12 PROCEDURE — 80048 BASIC METABOLIC PNL TOTAL CA: CPT

## 2018-06-12 PROCEDURE — 71275 CT ANGIOGRAPHY CHEST: CPT

## 2018-06-12 PROCEDURE — 85025 COMPLETE CBC W/AUTO DIFF WBC: CPT

## 2018-06-12 PROCEDURE — 36415 COLL VENOUS BLD VENIPUNCTURE: CPT

## 2018-06-12 RX ADMIN — IOHEXOL 85 ML: 350 INJECTION, SOLUTION INTRAVENOUS at 08:10

## 2018-06-12 NOTE — PROGRESS NOTES
CTA done today shows no PE  He does have 7mm nodule that is spiculated and concerning for malignancy on PT/CT  I discussed with Dr Herlinda Ramos who ordered repeat cT  He will review the image

## 2018-06-28 ENCOUNTER — OFFICE VISIT (OUTPATIENT)
Dept: FAMILY MEDICINE CLINIC | Facility: CLINIC | Age: 73
End: 2018-06-28
Payer: MEDICARE

## 2018-06-28 VITALS
DIASTOLIC BLOOD PRESSURE: 70 MMHG | HEART RATE: 95 BPM | RESPIRATION RATE: 16 BRPM | WEIGHT: 178 LBS | BODY MASS INDEX: 21.11 KG/M2 | OXYGEN SATURATION: 98 % | SYSTOLIC BLOOD PRESSURE: 160 MMHG

## 2018-06-28 DIAGNOSIS — R91.8 LUNG NODULES: ICD-10-CM

## 2018-06-28 DIAGNOSIS — I10 ESSENTIAL HYPERTENSION: ICD-10-CM

## 2018-06-28 DIAGNOSIS — J43.2 CENTRILOBULAR EMPHYSEMA (HCC): ICD-10-CM

## 2018-06-28 DIAGNOSIS — K21.9 GASTROESOPHAGEAL REFLUX DISEASE WITHOUT ESOPHAGITIS: Primary | ICD-10-CM

## 2018-06-28 DIAGNOSIS — N40.1 BENIGN PROSTATIC HYPERPLASIA WITH LOWER URINARY TRACT SYMPTOMS, SYMPTOM DETAILS UNSPECIFIED: Chronic | ICD-10-CM

## 2018-06-28 PROCEDURE — 99214 OFFICE O/P EST MOD 30 MIN: CPT | Performed by: FAMILY MEDICINE

## 2018-06-28 RX ORDER — AMLODIPINE BESYLATE 10 MG/1
TABLET ORAL
COMMUNITY
Start: 2018-06-20 | End: 2018-09-16 | Stop reason: SDUPTHER

## 2018-06-28 RX ORDER — FINASTERIDE 5 MG/1
5 TABLET, FILM COATED ORAL EVERY MORNING
COMMUNITY
Start: 2018-06-20 | End: 2020-09-24 | Stop reason: SDUPTHER

## 2018-06-28 NOTE — PROGRESS NOTES
Assessment/Plan:    Will  Double up on omeprazole and monitor  If no relief will need gi w/u  Will f/u with pulm and urol  Will cont meds for htn and pulm      There are no diagnoses linked to this encounter  Subjective:      Patient ID: Gwenetta Notice is a 75 y o  male  Patient seen in f/u  Has been doing okay did have nuc scan  Not sure if bx reachable by bronchoscopy so talking to surgery     Also needs toschedule urol proc  So will need pulm clearance  Okay on meds but has been having heartburn    On omeprazole 40 and maalox  Does not have  Gi appmt until feb  Had a lesion upper chest on skin    [place] off and bled  Now growing back        The following portions of the patient's history were reviewed and updated as appropriate: past family history, past medical history, past social history and past surgical history  Review of Systems   Constitutional: Negative  HENT: Negative  Eyes: Negative  Respiratory:        See hpi   Cardiovascular: Negative  Gastrointestinal:        See hpi   Endocrine: Negative  Genitourinary:        See hpi   Musculoskeletal: Negative  Skin: Negative  Neurological: Negative  Psychiatric/Behavioral: Negative  Objective:      /70 (BP Location: Left arm, Patient Position: Sitting)   Pulse 95   Resp 16   Wt 80 7 kg (178 lb)   SpO2 98%   BMI 21 11 kg/m²          Physical Exam   Constitutional: He is oriented to person, place, and time  Thin male   HENT:   Head: Normocephalic and atraumatic  Right Ear: External ear normal    Left Ear: External ear normal    Eyes: Conjunctivae and EOM are normal  Pupils are equal, round, and reactive to light  Neck: Normal range of motion  Neck supple  Cardiovascular: Normal rate, regular rhythm and normal heart sounds  Pulmonary/Chest: He has no wheezes  He has no rales  Dec bs bessie   Abdominal: Soft  Bowel sounds are normal    Musculoskeletal: Normal range of motion     Neurological: He is alert and oriented to person, place, and time  Skin: Skin is warm  Psychiatric: He has a normal mood and affect   His behavior is normal

## 2018-07-02 ENCOUNTER — OFFICE VISIT (OUTPATIENT)
Dept: PULMONOLOGY | Facility: MEDICAL CENTER | Age: 73
End: 2018-07-02
Payer: MEDICARE

## 2018-07-02 VITALS
SYSTOLIC BLOOD PRESSURE: 146 MMHG | WEIGHT: 179 LBS | BODY MASS INDEX: 21.13 KG/M2 | OXYGEN SATURATION: 98 % | RESPIRATION RATE: 12 BRPM | HEIGHT: 77 IN | DIASTOLIC BLOOD PRESSURE: 84 MMHG | HEART RATE: 92 BPM | TEMPERATURE: 97.8 F

## 2018-07-02 DIAGNOSIS — R91.8 LUNG NODULES: Primary | ICD-10-CM

## 2018-07-02 DIAGNOSIS — J43.2 CENTRILOBULAR EMPHYSEMA (HCC): ICD-10-CM

## 2018-07-02 DIAGNOSIS — N40.1 BENIGN PROSTATIC HYPERPLASIA WITH LOWER URINARY TRACT SYMPTOMS, SYMPTOM DETAILS UNSPECIFIED: Chronic | ICD-10-CM

## 2018-07-02 DIAGNOSIS — K21.9 GASTROESOPHAGEAL REFLUX DISEASE WITHOUT ESOPHAGITIS: ICD-10-CM

## 2018-07-02 DIAGNOSIS — E88.01 AAT (ALPHA-1-ANTITRYPSIN) DEFICIENCY (HCC): ICD-10-CM

## 2018-07-02 PROCEDURE — 99214 OFFICE O/P EST MOD 30 MIN: CPT | Performed by: INTERNAL MEDICINE

## 2018-07-02 NOTE — PROGRESS NOTES
Assessment/Plan:     Problem List Items Addressed This Visit        Digestive    AAT (alpha-1-antitrypsin) deficiency ([facility] [address] )     He does receive weekly alpha-1 antitrypsin replacement therapy and has been doing so since he was diagnosed with this deficiency in 2002         Gastroesophageal reflux disease without esophagitis     Gilbert Fuentes has had increased gastric reflux over the last couple months and he still gets symptoms despite taking omeprazole 40 mg twice a day  He has been on theophylline 400 mg once a day for the past few months and this may be worsening his GERD  Theophylline can cause relaxation of the lower esophageal sphincter so I will discontinue this medication            Respiratory    Centrilobular emphysema ([facility] [address] )     Gilbert Fuentes has severe COPD  Last FEV1 was 1 6 L or 40% of predicted  He will continue with Advair 250 mcg 1 puff b i d  and Incruse 1 puff daily  He rarely needs to use his rescue albuterol inhaler or nebulizer with albuterol  Genitourinary    BPH (benign prostatic hyperplasia) (Chronic)     Gilbert Fuentes is being seen by urologist, Dr Aliyah Payan  He is being considered for some type of TURP procedure  From pulmonary perspective he stable to tolerate this procedure and I would recommend to Eliquis be held for at least 2 days prior to procedure            Other    Lung nodules - Primary     Patient has history of bilateral lung nodules  Most recent CTA of the chest done June 12, 2018 did not show any significant change in the 9 x 7 mm nodule in the right upper lobe  In fact back in February 2018 when he had a PET scan done this nodule was 12 x 8 mm in size  I cannot exclude neoplasm but this nodule has decreased in size and may be too small for CT-guided needle biopsy  He had also would be at increased risk for pneumothorax because of his emphysema  I will confer with interventional Radiology about the feasibility of a CT-guided biopsy if needed in the future    I did order a follow-up CT scan of the chest without contrast to be done in September 2018 or 3 months from this most recent CT scan         Relevant Orders    CT chest without contrast            Return in about 3 months (around 10/2/2018)  All questions are answered to the patient's satisfaction and understanding  He verbalizes understanding  He is encouraged to call with any further questions or concerns  Portions of the record may have been created with voice recognition software  Occasional wrong word or "sound a like" substitutions may have occurred due to the inherent limitations of voice recognition software  Read the chart carefully and recognize, using context, where substitutions have occurred  Electronically Signed by Kati Chino DO    ______________________________________________________________________    Chief Complaint:   Chief Complaint   Patient presents with    Follow-up     2m    Shortness of Breath     about the same    Cough     very little       Patient ID: Rik Carter is a 75 y o  y o  male has a past medical history of Anesthesia complication; Arthritis; BPH (benign prostatic hyperplasia); Cancer [facility]); COPD (chronic obstructive pulmonary disease) ([facility] [address] ); Full dentures; Hypertension; Kidney stone; Liver disease; Pulmonary emphysema ([facility] [address] ); and Wears glasses  7/2/2018  WILL Zhou presents for follow-up visit today  He does have severe COPD but overall is doing better since last visit  He is not have any hemoptysis or weight loss  His breathing actually has improved somewhat since last visit  Still gets short of breath with exertion but this is better  His main complaint is that he gets frequent heartburn despite taking omeprazole 40 mg twice a day  He he still sometimes gets some slight dizziness when he 1st gets up at of a chair  But this is better  He does receive weekly infusions of alpha-1 antitrypsin replacement therapy    He is not having any change in his appetite or weight loss  He sometimes has some mild leg edema at the end of the day  He also has some problem with urinary retention at times and doctor Margarita Leonardo, his urologist, and is being considered for TURP type procedure  Review of Systems   Constitutional: Negative for chills, fever and unexpected weight change  HENT: Negative for congestion, rhinorrhea and sore throat  Eyes: Negative for discharge and redness  Respiratory:        Does have chronic exertional shortness of breath  This is better since last visit   Cardiovascular: Negative for chest pain, palpitations and leg swelling  Gastrointestinal: Negative for abdominal distention, abdominal pain and nausea  Does get daily gastric reflux symptoms and heartburn  Endocrine: Negative for polydipsia and polyphagia  Genitourinary: Negative for dysuria  Musculoskeletal: Negative for joint swelling and myalgias  Skin: Negative for rash  Neurological: Negative for light-headedness  Smoking history: He reports that he quit smoking about 10 months ago  He has a 60 00 pack-year smoking history   He has never used smokeless tobacco     The following portions of the patient's history were reviewed and updated as appropriate: allergies, current medications, past family history, past medical history, past social history, past surgical history and problem list     Immunization History   Administered Date(s) Administered    Influenza TIV (IM) 09/27/2013, 10/23/2014    Pneumococcal Conjugate 13-Valent 04/26/2016    Pneumococcal Polysaccharide PPV23 04/23/2011    Tdap 04/26/2016    Zoster 10/29/2014, 04/27/2015     Current Outpatient Prescriptions   Medication Sig Dispense Refill    albuterol (2 5 mg/3 mL) 0 083 % nebulizer solution Inhale 1 each every 4 (four) hours as needed      albuterol (PROVENTIL HFA,VENTOLIN HFA) 90 mcg/act inhaler Inhale 2 puffs every 6 (six) hours as needed for wheezing 1 Inhaler 0    Alpha1-Proteinase Inhibitor (Sia Diego IV) Infuse into a venous catheter once a week On tuesday -1000mg /50 ml       amLODIPine (NORVASC) 5 mg tablet Take 1 tablet (5 mg total) by mouth daily 30 tablet 3    apixaban (ELIQUIS) 5 mg Take 1 tablet (5 mg total) by mouth 2 (two) times a day 60 tablet 0    finasteride (PROSCAR) 5 mg tablet       fluticasone (FLONASE) 50 mcg/act nasal spray       fluticasone-salmeterol (ADVAIR DISKUS) 250-50 mcg/dose inhaler Inhale      meclizine (ANTIVERT) 12 5 MG tablet Take 1 tablet by mouth daily      omeprazole (PriLOSEC) 40 MG capsule       tamsulosin (FLOMAX) 0 4 mg Take 0 4 mg by mouth daily at bedtime      theophylline (ESTEFANIA-24) 400 MG 24 hr capsule Take 1 capsule (400 mg total) by mouth daily 90 capsule 3    Umeclidinium Bromide 62 5 MCG/INH AEPB Inhale 1 puff daily       zolpidem (AMBIEN CR) 6 25 MG CR tablet Take 10 mg by mouth daily at bedtime as needed for sleep      amLODIPine (NORVASC) 10 mg tablet       SHINGRIX 50 MCG SUSR       ZEMAIRA injection        No current facility-administered medications for this visit  Allergies: Chantix [varenicline]    Objective:  Vitals:    07/02/18 0803   BP: 146/84   BP Location: Left arm   Patient Position: Sitting   Cuff Size: Standard   Pulse: 92   Resp: 12   Temp: 97 8 °F (36 6 °C)   TempSrc: Oral   SpO2: 98%   Weight: 81 2 kg (179 lb)   Height: 6' 5" (1 956 m)   Oxygen Therapy  SpO2: 98 %    Wt Readings from Last 3 Encounters:   07/02/18 81 2 kg (179 lb)   06/28/18 80 7 kg (178 lb)   05/24/18 81 6 kg (180 lb)     Body mass index is 21 23 kg/m²  Physical Exam   Constitutional: He is oriented to person, place, and time  He appears well-developed and well-nourished  No distress  Room air O2 saturation at rest 98%  After ambulating 100 feet lowest O2 saturation was 93%   HENT:   Head: Normocephalic  Nose: Nose normal    Mouth/Throat: Oropharynx is clear and moist  No oropharyngeal exudate     Eyes: Conjunctivae are normal  Pupils are equal, round, and reactive to light  Neck: Neck supple  No JVD present  No tracheal deviation present  Cardiovascular: Normal rate, regular rhythm and normal heart sounds  Pulmonary/Chest: Effort normal    Lung sounds are diminished but clear  No wheezes, crackles or rhonchi   Abdominal: Soft  He exhibits no distension  There is no tenderness  There is no guarding  Musculoskeletal: He exhibits no edema  Lymphadenopathy:     He has no cervical adenopathy  Neurological: He is alert and oriented to person, place, and time  Skin: Skin is warm and dry  No rash noted  Psychiatric: He has a normal mood and affect  His behavior is normal  Thought content normal      Diagnostics:  Had CTA of chest done 06/12/2018 and I personally reviewed CT images with patient  No evidence of any pulmonary embolism  Prior PE have resolved  There is a stable 9 mm x 7 mm nodule in the right upper lobe and stable 4 mm right perifissural nodule  There are a couple tiny new nodules in the left lower lobe  There are diffuse emphysematous changes  I compared this CT scan to February 2018 in the right upper lobe lung nodules actually smaller  Back then was 12 mm x 8 mm  There was slightly hypermetabolic with SUV of 3 4 but this may be because it is inflammatory in nature  Cta Chest Pe Study    Result Date: 6/12/2018  Narrative: CTA - CHEST WITH IV CONTRAST - PULMONARY ANGIOGRAM INDICATION:   Mid chest and back pain for 2 weeks with shortness of breath  Prior pulmonary embolism in the left lower lobe  Lung nodules  COMPARISON: Chest CTA dated 3/30/2018, 3/12/2018, and 2/14/2018  Chest CT dated 12/4/2013  TECHNIQUE: CTA examination of the chest was performed using angiographic technique according to a protocol specifically tailored to evaluate for pulmonary embolism  Axial, sagittal, and coronal 2D reformatted images were created from the source data and  submitted for interpretation    In addition, coronal 3D MIP postprocessing was performed on the acquisition scanner  Radiation dose length product (DLP) for this visit:  647 56 mGy-cm   This examination, like all CT scans performed in the [facility], was performed utilizing techniques to minimize radiation dose exposure, including the use of iterative  reconstruction and automated exposure control  IV Contrast:  85 mL of iohexol (OMNIPAQUE)  FINDINGS: PULMONARY ARTERIAL TREE:  No pulmonary embolus is seen  LUNGS:  Mild panacinar emphysema  Mild atelectatic changes dependently in the right lower lobe  Linear scarring in the left lung base  Right basilar bulla measuring 3 9 cm--unchanged  No acute consolidation, interlobular septal thickening, or endobronchial lesions  4 mm right upper lobe pulmonary nodule on series 2 image 86--new  9 mm x 7 mm mildly spiculated right upper lobe pulmonary nodule on series 2 image 67--unchanged  4 mm perifissural nodule in the right upper lobe on series 2 image 105--new  3 mm nodule  in the right upper lobe on series 2 image 109--slightly increased in size  5 mm right lower lobe pulmonary nodule on series 2 image 111--new  4 mm left lower lobe nodule on series 2 image 161--unchanged  PLEURA:  Mild focal pleural thickening in the right major fissure on series 2 image 135  No calcified plaques  No effusion or pneumothorax  HEART/AORTA:  Mild chronic right ventricular enlargement  Scattered coronary calcifications  No pericardial thickening or effusion  Thoracic aortic atherosclerosis without aneurysm  MEDIASTINUM AND MIKE:  No mediastinal or hilar lymphadenopathy or masses  CHEST WALL AND LOWER NECK:   Unremarkable  VISUALIZED STRUCTURES IN THE UPPER ABDOMEN:  Incompletely characterized left renal cyst  OSSEOUS STRUCTURES:  No acute fracture or destructive osseous lesion  Impression: 1  No pulmonary embolism  2   Spiculated right upper lobe nodule with mean diameter of 8 mm is unchanged in size    PET/CT in February was concerning for malignancy  Correlate with tissue sampling of medullary performed  3   Multiple new and increasing bilateral pulmonary nodules demonstrated on the order of 3 to 4 mm in size  Differential considerations include metastasis as well as acute infectious or inflammatory processes   Workstation performed: [id number]

## 2018-07-02 NOTE — PATIENT INSTRUCTIONS
Stop theophylline - call us later this week and let us know if you're heartburn improves after you stop the theophylline    Will do follow-up CT scan of chest without contrast in mid September for follow up of lung nodules    Oxygen 3 l/m nc at bedtime during the day when needed with activity    Okay to have urologic procedure done by Dr Savannah Marin  Will hold Eliquis for a few days around the time of the surgery      Follow-up in September after CT scan

## 2018-07-02 NOTE — ASSESSMENT & PLAN NOTE
Patient has history of bilateral lung nodules  Most recent CTA of the chest done June 12, 2018 did not show any significant change in the 9 x 7 mm nodule in the right upper lobe  In fact back in February 2018 when he had a PET scan done this nodule was 12 x 8 mm in size  I cannot exclude neoplasm but this nodule has decreased in size and may be too small for CT-guided needle biopsy  He had also would be at increased risk for pneumothorax because of his emphysema  I will confer with interventional Radiology about the feasibility of a CT-guided biopsy if needed in the future    I did order a follow-up CT scan of the chest without contrast to be done in September 2018 or 3 months from this most recent CT scan

## 2018-07-02 NOTE — ASSESSMENT & PLAN NOTE
He does receive weekly alpha-1 antitrypsin replacement therapy and has been doing so since he was diagnosed with this deficiency in 2002

## 2018-07-02 NOTE — ASSESSMENT & PLAN NOTE
Oxygen saturations today are were good  On room air O2 saturation 90% and with ambulating 100 feet was 93%  The continue 3 L of oxygen as needed with activity and at bedtime

## 2018-07-02 NOTE — ASSESSMENT & PLAN NOTE
Lico Niño is being seen by urologist, Dr Sylvia Muñoz  He is being considered for some type of TURP procedure    From pulmonary perspective he stable to tolerate this procedure and I would recommend to Eliquis be held for at least 2 days prior to procedure

## 2018-07-02 NOTE — ASSESSMENT & PLAN NOTE
Merlin Mayer has severe COPD  Last FEV1 was 1 6 L or 40% of predicted  He will continue with Advair 250 mcg 1 puff b i d  and Incruse 1 puff daily  He rarely needs to use his rescue albuterol inhaler or nebulizer with albuterol

## 2018-07-02 NOTE — ASSESSMENT & PLAN NOTE
Merlin Mayer has had increased gastric reflux over the last couple months and he still gets symptoms despite taking omeprazole 40 mg twice a day  He has been on theophylline 400 mg once a day for the past few months and this may be worsening his GERD    Theophylline can cause relaxation of the lower esophageal sphincter so I will discontinue this medication

## 2018-07-02 NOTE — ASSESSMENT & PLAN NOTE
Maddison Hernandez did have pulmonary emboli in February this year and these have resolved  Because of his inactivity I feel he should remain on Eliquis for now    He is tolerating this without problems

## 2018-07-06 ENCOUNTER — OFFICE VISIT (OUTPATIENT)
Dept: HEMATOLOGY ONCOLOGY | Facility: MEDICAL CENTER | Age: 73
End: 2018-07-06
Payer: MEDICARE

## 2018-07-06 VITALS
TEMPERATURE: 97.7 F | BODY MASS INDEX: 21.49 KG/M2 | HEIGHT: 77 IN | RESPIRATION RATE: 16 BRPM | SYSTOLIC BLOOD PRESSURE: 162 MMHG | OXYGEN SATURATION: 98 % | WEIGHT: 182 LBS | DIASTOLIC BLOOD PRESSURE: 70 MMHG | HEART RATE: 86 BPM

## 2018-07-06 DIAGNOSIS — I26.09 OTHER CHRONIC PULMONARY EMBOLISM WITH ACUTE COR PULMONALE (HCC): Primary | ICD-10-CM

## 2018-07-06 DIAGNOSIS — I27.82 OTHER CHRONIC PULMONARY EMBOLISM WITH ACUTE COR PULMONALE (HCC): Primary | ICD-10-CM

## 2018-07-06 PROCEDURE — 99214 OFFICE O/P EST MOD 30 MIN: CPT | Performed by: INTERNAL MEDICINE

## 2018-07-06 NOTE — PROGRESS NOTES
[facility]  1945  [address] [facility] [id number] [phone]    DISCUSSION  SUMMARY:    78-year-old male recently  the hospital with chest pain  Workup demonstrated an acute pulmonary emboli within the distal left main pulmonary artery and proximal left lower lobe segmental branches  Issues:    1  Pulmonary embolism  Patient feels better but still has shortness of breath and dyspnea on exertion and needs constant oxygen  Follow-up CTA/chest demonstrated resolution  Patient is to continue with the eliquis  We discussed what to monitor for is far as acute respiratory issues as well as excessive bruising/bleeding  Six months will be mid August 2018  I will speak with Dr Valery Guy to see if he agrees with discontinuing anticoagulation at that time  2  Pulmonary nodules, emphysema  Pulmonary follow-up is ongoing  Repeat CT scan demonstrated stable disease  3  Alpha-1 antitrypsin deficiency  Surveillance is ongoing  Patient is on weekly IV alpha 1 proteinase inhibitor  4, Urinary issues  Patient has been referred to Urology for evaluation  Although the specifics are not entirely clear, patient is scheduled for procedure  Anticoagulation obviously needs to be held for few days around the procedure  Patient is to return in 3 months  Patient knows to call the hematology/oncology office if there are any other questions or concerns  Carefully review your medication list and verify that the list is accurate and up-to-date  Please call the hematology/oncology office if there are medications missing from the list, medications on the list that you are not currently taking or if there is a dosage or instruction that is different from how you're taking that medication      Patient goals and areas of care: continue with anticoagulation  Patient is able to self-care   _____________________________________________________________________________________    Chief Complaint   Patient presents with    Follow-up     PE on Eliquis     History of Present Illness:    76-year-old male recently admitted to the hospital with chest pain  Workup demonstrated PE  Patient was started on anticoagulation, now on eliquis  Mr Mohan Nowak returns for follow-up  Patient states feeling okay, better than before but still with shortness of breath and dyspnea on exertion  No chest pain  Dry cough is the same as before; no sputum or hemoptysis  Patient is on continuous oxygen  No fevers, chills or sweats  Appetite is okay, weight is stable  Activities are limited but no different than before  Patient denies any bleeding but bruises easily  Patient has a history of difficulty voiding  Urology workup is in process  Review of Systems   Constitutional: Negative  HENT: Negative  Eyes: Negative  Respiratory: Positive for cough and shortness of breath  Cardiovascular: Negative  Gastrointestinal: Negative  Endocrine: Negative  Genitourinary: Positive for difficulty urinating and urgency  Musculoskeletal: Negative  Skin: Negative  Allergic/Immunologic: Negative  Neurological: Negative  Hematological: Bruises/bleeds easily  Psychiatric/Behavioral: Negative  All other systems reviewed and are negative       Patient Active Problem List   Diagnosis    AAT (alpha-1-antitrypsin) deficiency ([facility] [address] )    Causalgia of lower limb    Essential hypertension    Gastroesophageal reflux disease without esophagitis    Dyspnea    HTN (hypertension)    BPH (benign prostatic hyperplasia)    Liver disease    Lung nodules    Former smoker    Chest pain at rest    Pulmonary embolism [facility])   [facility] discharge follow-up    Chronic hypoxemic respiratory failure ([facility] [address] )    Centrilobular emphysema ([facility] [address] )    Lightheadedness     Past Medical History:   Diagnosis Date    Anesthesia complication     Difficult to wake up    Arthritis     BPH (benign prostatic hyperplasia)     urinary frequency    Cancer (HCC)     basal cell neck, face    COPD (chronic obstructive pulmonary disease) (HCC)     Full dentures     Hypertension     controlled    Kidney stone     at least 7 episodes    Liver disease     Alpha 1- enzyme deficiency - diagnosed 2002  has been on weekly replacement therapy since then    Pulmonary emphysema ([facility] [address] )     1/25/15  FEV1 - 2 45 liters or 59% of predicted    Wears glasses     for driving only     Past Surgical History:   Procedure Laterality Date    BACK SURGERY  2008    discectomy    COLONOSCOPY      COLONOSCOPY N/A 3/10/2017    Procedure: Rm Linares;  Surgeon: Verito Barrera MD;  Location: [place] [address] GI LAB; Service:    Roseann Qualia      removal of kidney stones    ESOPHAGOGASTRODUODENOSCOPY N/A 3/10/2017    Procedure: ESOPHAGOGASTRODUODENOSCOPY (EGD); Surgeon: Verito Barrera MD;  Location: [facility] GI LAB; Service:     LITHOTRIPSY      TONSILLECTOMY      VEIN LIGATION AND STRIPPING Bilateral     18's     Family History   Problem Relation Age of Onset    Emphysema Mother         never smoked    Emphysema Father     Cancer Brother         colon    Colon cancer Brother     Ulcerative colitis Family     Liver disease Family      Social History     Social History    Marital status:      Spouse name: N/A    Number of children: N/A    Years of education: N/A     Occupational History    Not on file       Social History Main Topics    Smoking status: Former Smoker     Packs/day: 1 00     Years: 60 00     Quit date: 8/31/2017    Smokeless tobacco: Never Used      Comment: quit in august 2017    Alcohol use No    Drug use: No    Sexual activity: Not on file     Other Topics Concern    Not on file     Social History Narrative    No narrative on file       Current Outpatient Prescriptions:     albuterol (2 5 mg/3 mL) 0 083 % nebulizer solution, Inhale 1 each every 4 (four) hours as needed, Disp: , Rfl:     albuterol (PROVENTIL HFA,VENTOLIN HFA) 90 mcg/act inhaler, Inhale 2 puffs every 6 (six) hours as needed for wheezing, Disp: 1 Inhaler, Rfl: 0    Alpha1-Proteinase Inhibitor (ZEMAIRA IV), Infuse into a venous catheter once a week On tuesday -1000mg /50 ml , Disp: , Rfl:     amLODIPine (NORVASC) 10 mg tablet, , Disp: , Rfl:     apixaban (ELIQUIS) 5 mg, Take 1 tablet (5 mg total) by mouth 2 (two) times a day, Disp: 60 tablet, Rfl: 0    finasteride (PROSCAR) 5 mg tablet, , Disp: , Rfl:     fluticasone (FLONASE) 50 mcg/act nasal spray, , Disp: , Rfl:     fluticasone-salmeterol (ADVAIR DISKUS) 250-50 mcg/dose inhaler, Inhale, Disp: , Rfl:     meclizine (ANTIVERT) 12 5 MG tablet, Take 1 tablet by mouth daily, Disp: , Rfl:     omeprazole (PriLOSEC) 40 MG capsule, , Disp: , Rfl:     SHINGRIX 50 MCG SUSR, , Disp: , Rfl:     tamsulosin (FLOMAX) 0 4 mg, Take 0 4 mg by mouth daily at bedtime, Disp: , Rfl:     Umeclidinium Bromide 62 5 MCG/INH AEPB, Inhale 1 puff daily , Disp: , Rfl:     ZEMAIRA injection, , Disp: , Rfl:     zolpidem (AMBIEN CR) 6 25 MG CR tablet, Take 10 mg by mouth daily at bedtime as needed for sleep, Disp: , Rfl:     amLODIPine (NORVASC) 5 mg tablet, Take 1 tablet (5 mg total) by mouth daily, Disp: 30 tablet, Rfl: 3    theophylline (ESTEFANIA-24) 400 MG 24 hr capsule, Take 1 capsule (400 mg total) by mouth daily, Disp: 90 capsule, Rfl: 3    Allergies   Allergen Reactions    Chantix [Varenicline]      Caused prostate infection       Vitals:    07/06/18 0909   BP: 162/70   Pulse: 86   Resp: 16   Temp: 97 7 °F (36 5 °C)   SpO2: 98%     Physical Exam   Constitutional: He is oriented to person, place, and time  He appears well-developed and well-nourished  Thin male, mild respiratory distress, nasal cannula O2 in place   HENT:   Head: Normocephalic and atraumatic     Right Ear: External ear normal    Left Ear: External ear normal    Nose: Nose normal    Mouth/Throat: Oropharynx is clear and moist    Eyes: Conjunctivae and EOM are normal  Pupils are equal, round, and reactive to light  Neck: Normal range of motion  Neck supple  Cardiovascular: Normal rate, regular rhythm, normal heart sounds and intact distal pulses  Pulmonary/Chest:   Decreased breath sounds bilaterally, otherwise clear   Abdominal: Soft  Bowel sounds are normal    Musculoskeletal: Normal range of motion  Neurological: He is alert and oriented to person, place, and time  He has normal reflexes  Skin: Skin is warm  Skin with relatively good color, scattered ecchymoses, no petechiae   Psychiatric: He has a normal mood and affect  His behavior is normal  Judgment and thought content normal    Extremities: no edema, no cords, pulses are 1+  Lymphatics: no adenopathy in the neck, supraclavicular region, axilla and groin bilaterally    Labs:    06/12/2018 WBC = 6 3 hemoglobin = 13 8 hematocrit = 41 6 platelet = 191 neutrophil = 56%  04/04/2018 Alpha 1 antitrypsin = 74 = decreased   4/4/18 WBC = 11 hemoglobin = 13 2 hematocrit = 38 7 platelet = 771 neutrophil = 89%  2/18/18 BUN = 18 creatinine = 1 17 WBC = 5 9 hemoglobin = 12 1 hematocrit = 36 platelet = 755  8/17/46 alpha-fetoprotein = 4 8    Imaging    06/12/2018 CTA/chest    1  No pulmonary embolism  2   Spiculated right upper lobe nodule with mean diameter of 8 mm is unchanged in size  PET/CT in February was concerning for malignancy  Correlate with tissue sampling of medullary performed  3   Multiple new and increasing bilateral pulmonary nodules demonstrated on the order of 3 to 4 mm in size  Differential considerations include metastasis as well as acute infectious or inflammatory processes  2/14/18 CTA chest PE study     1   Acute pulmonary embolus in the distal left main pulmonary artery and proximal left lower lobe segmental branches  2   Moderate panlobular emphysema    3   Stable right upper and left lower pulmonary nodules measuring 1 2 x 1 3 cm  PET/CT was performed, based on current Fleischner Society 2017 Guidelines on incidental pulmonary nodule   Tissue sampling of the right upper lobe hypermetabolic nodule is   planned   3 month follow-up of left lower lobe pulmonary nodule as previously recommended      2/12/18 PET/CT     1   1 2 x 0 8 cm right upper lung nodule demonstrates hypermetabolism and is most concerning for malignancy   Tissue sampling recommended  2   1 3 x 0 6 cm left lower lung nodule does not demonstrate significant hypermetabolism   In the absence of tissue sampling, 3 month CT follow-up is recommended to exclude a low-grade neoplasm  3   No hypermetabolic metastases visualized

## 2018-07-21 DIAGNOSIS — F51.01 PRIMARY INSOMNIA: Primary | ICD-10-CM

## 2018-07-23 RX ORDER — ZOLPIDEM TARTRATE 10 MG/1
TABLET ORAL
Qty: 30 TABLET | Refills: 5 | OUTPATIENT
Start: 2018-07-23 | End: 2019-01-07 | Stop reason: SDUPTHER

## 2018-07-28 DIAGNOSIS — N40.1 BENIGN PROSTATIC HYPERPLASIA WITH URINARY FREQUENCY: Primary | ICD-10-CM

## 2018-07-28 DIAGNOSIS — R35.0 BENIGN PROSTATIC HYPERPLASIA WITH URINARY FREQUENCY: Primary | ICD-10-CM

## 2018-07-30 DIAGNOSIS — J31.0 RHINITIS, UNSPECIFIED TYPE: Primary | ICD-10-CM

## 2018-07-30 RX ORDER — TAMSULOSIN HYDROCHLORIDE 0.4 MG/1
CAPSULE ORAL
Qty: 30 CAPSULE | Refills: 5 | Status: SHIPPED | OUTPATIENT
Start: 2018-07-30 | End: 2019-02-03 | Stop reason: SDUPTHER

## 2018-07-30 RX ORDER — FLUTICASONE PROPIONATE 50 MCG
2 SPRAY, SUSPENSION (ML) NASAL DAILY
Qty: 16 G | Refills: 5 | Status: SHIPPED | OUTPATIENT
Start: 2018-07-30 | End: 2019-08-21 | Stop reason: SDUPTHER

## 2018-08-24 ENCOUNTER — TELEPHONE (OUTPATIENT)
Dept: PULMONOLOGY | Facility: MEDICAL CENTER | Age: 73
End: 2018-08-24

## 2018-09-06 DIAGNOSIS — K21.9 GASTROESOPHAGEAL REFLUX DISEASE, ESOPHAGITIS PRESENCE NOT SPECIFIED: Primary | ICD-10-CM

## 2018-09-06 RX ORDER — OMEPRAZOLE 40 MG/1
CAPSULE, DELAYED RELEASE ORAL
Qty: 60 CAPSULE | Refills: 5 | Status: SHIPPED | OUTPATIENT
Start: 2018-09-06 | End: 2018-11-07 | Stop reason: ALTCHOICE

## 2018-09-09 DIAGNOSIS — J43.2 CENTRILOBULAR EMPHYSEMA (HCC): Primary | ICD-10-CM

## 2018-09-10 RX ORDER — UMECLIDINIUM 62.5 UG/1
AEROSOL, POWDER ORAL
Qty: 1 INHALER | Refills: 4 | Status: SHIPPED | OUTPATIENT
Start: 2018-09-10 | End: 2018-10-17 | Stop reason: HOSPADM

## 2018-09-13 ENCOUNTER — HOSPITAL ENCOUNTER (OUTPATIENT)
Dept: RADIOLOGY | Facility: HOSPITAL | Age: 73
Discharge: HOME | End: 2018-09-13
Attending: INTERNAL MEDICINE
Payer: MEDICARE

## 2018-09-13 DIAGNOSIS — R91.8 LUNG NODULES: ICD-10-CM

## 2018-09-13 PROCEDURE — 71250 CT THORAX DX C-: CPT

## 2018-09-16 DIAGNOSIS — I10 ESSENTIAL HYPERTENSION: Primary | ICD-10-CM

## 2018-09-17 RX ORDER — AMLODIPINE BESYLATE 10 MG/1
TABLET ORAL
Qty: 60 TABLET | Refills: 5 | Status: SHIPPED | OUTPATIENT
Start: 2018-09-17 | End: 2019-10-04 | Stop reason: SDUPTHER

## 2018-10-02 ENCOUNTER — OFFICE VISIT (OUTPATIENT)
Dept: FAMILY MEDICINE CLINIC | Facility: CLINIC | Age: 73
End: 2018-10-02
Payer: MEDICARE

## 2018-10-02 VITALS
HEART RATE: 94 BPM | SYSTOLIC BLOOD PRESSURE: 182 MMHG | RESPIRATION RATE: 22 BRPM | OXYGEN SATURATION: 96 % | DIASTOLIC BLOOD PRESSURE: 112 MMHG | WEIGHT: 182 LBS | TEMPERATURE: 97.3 F | BODY MASS INDEX: 21.58 KG/M2

## 2018-10-02 DIAGNOSIS — E88.01 AAT (ALPHA-1-ANTITRYPSIN) DEFICIENCY (HCC): ICD-10-CM

## 2018-10-02 DIAGNOSIS — J96.11 CHRONIC HYPOXEMIC RESPIRATORY FAILURE (HCC): ICD-10-CM

## 2018-10-02 DIAGNOSIS — K21.9 GASTROESOPHAGEAL REFLUX DISEASE WITHOUT ESOPHAGITIS: ICD-10-CM

## 2018-10-02 DIAGNOSIS — R91.8 LUNG NODULES: ICD-10-CM

## 2018-10-02 DIAGNOSIS — I10 ESSENTIAL HYPERTENSION: Primary | ICD-10-CM

## 2018-10-02 DIAGNOSIS — J43.2 CENTRILOBULAR EMPHYSEMA (HCC): ICD-10-CM

## 2018-10-02 DIAGNOSIS — K21.9 GASTROESOPHAGEAL REFLUX DISEASE, ESOPHAGITIS PRESENCE NOT SPECIFIED: ICD-10-CM

## 2018-10-02 PROCEDURE — 99214 OFFICE O/P EST MOD 30 MIN: CPT | Performed by: FAMILY MEDICINE

## 2018-10-02 RX ORDER — SUCRALFATE ORAL 1 G/10ML
1 SUSPENSION ORAL 4 TIMES DAILY
Qty: 420 ML | Refills: 5 | Status: SHIPPED | OUTPATIENT
Start: 2018-10-02 | End: 2019-01-24 | Stop reason: SDUPTHER

## 2018-10-02 NOTE — PROGRESS NOTES
Assessment/Plan:    repet bp 168/74  Will have him make nv and bring home cuff cont pres rx  Will add carafate for gerd and monitor  Will need to f/u with gi  instructions     Diagnoses and all orders for this visit:    Gastroesophageal reflux disease, esophagitis presence not specified  -     sucralfate (CARAFATE) 1 g/10 mL suspension; Take 10 mL (1 g total) by mouth 4 (four) times a day          Subjective:      Patient ID: Toño Strickland is a 75 y o  male  Patient seen in f/u  Has had ct scan which was  Unchanged    Still having some urin sx  But did not have proc  Okay on meds  Does have home bp monitor  Has been okay when hes ckd it   He is having sign epigastric and abd sx in spite of taking omep bid         The following portions of the patient's history were reviewed and updated as appropriate: past family history, past medical history and past surgical history  Review of Systems   Constitutional: Positive for fatigue  See hpi   HENT: Negative  Eyes: Negative  Respiratory:        See hpi   Cardiovascular:        See hpi   Gastrointestinal:        See hpi   Genitourinary:        See hpi   Musculoskeletal: Negative  Skin: Negative  Neurological: Negative  Psychiatric/Behavioral: Negative  Objective:      BP (!) 182/112   Pulse 94   Temp (!) 97 3 °F (36 3 °C)   Resp 22   Wt 82 6 kg (182 lb)   SpO2 96%   BMI 21 58 kg/m²          Physical Exam   Constitutional: He is oriented to person, place, and time  Thin male    HENT:   Head: Normocephalic and atraumatic  Right Ear: External ear normal    Left Ear: External ear normal    Eyes: Pupils are equal, round, and reactive to light  Conjunctivae are normal    Neck: Normal range of motion  Neck supple  Cardiovascular: Normal rate, regular rhythm and normal heart sounds  Pulmonary/Chest:   Some inc work of breathing   Abdominal: Soft  Bowel sounds are normal    Musculoskeletal: Normal range of motion     Neurological: He is alert and oriented to person, place, and time  Skin: Skin is warm  Psychiatric: He has a normal mood and affect   His behavior is normal

## 2018-10-09 ENCOUNTER — CLINICAL SUPPORT (OUTPATIENT)
Dept: FAMILY MEDICINE CLINIC | Facility: CLINIC | Age: 73
End: 2018-10-09

## 2018-10-09 VITALS — HEART RATE: 88 BPM | DIASTOLIC BLOOD PRESSURE: 74 MMHG | SYSTOLIC BLOOD PRESSURE: 162 MMHG

## 2018-10-09 DIAGNOSIS — I10 HYPERTENSION, UNSPECIFIED TYPE: Primary | ICD-10-CM

## 2018-10-11 ENCOUNTER — APPOINTMENT (EMERGENCY)
Dept: RADIOLOGY | Facility: HOSPITAL | Age: 73
DRG: 191 | End: 2018-10-11
Payer: MEDICARE

## 2018-10-11 ENCOUNTER — TELEPHONE (OUTPATIENT)
Dept: FAMILY MEDICINE CLINIC | Facility: CLINIC | Age: 73
End: 2018-10-11

## 2018-10-11 ENCOUNTER — HOSPITAL ENCOUNTER (INPATIENT)
Facility: HOSPITAL | Age: 73
LOS: 6 days | Discharge: HOME HEALTH | DRG: 191 | End: 2018-10-17
Attending: EMERGENCY MEDICINE | Admitting: FAMILY MEDICINE
Payer: MEDICARE

## 2018-10-11 DIAGNOSIS — E88.01 AAT (ALPHA-1-ANTITRYPSIN) DEFICIENCY (HCC): ICD-10-CM

## 2018-10-11 DIAGNOSIS — K21.9 GASTROESOPHAGEAL REFLUX DISEASE WITHOUT ESOPHAGITIS: ICD-10-CM

## 2018-10-11 DIAGNOSIS — R91.8 LUNG NODULES: ICD-10-CM

## 2018-10-11 DIAGNOSIS — R06.00 DYSPNEA ON EXERTION: ICD-10-CM

## 2018-10-11 DIAGNOSIS — J44.1 COPD EXACERBATION (HCC): Primary | ICD-10-CM

## 2018-10-11 PROBLEM — R53.1 WEAKNESS GENERALIZED: Status: ACTIVE | Noted: 2018-10-11

## 2018-10-11 PROBLEM — R06.09 DYSPNEA ON EXERTION: Status: ACTIVE | Noted: 2017-12-30

## 2018-10-11 LAB
ALBUMIN SERPL BCP-MCNC: 4.2 G/DL (ref 3.5–5)
ALP SERPL-CCNC: 71 U/L (ref 46–116)
ALT SERPL W P-5'-P-CCNC: 36 U/L (ref 12–78)
ANION GAP SERPL CALCULATED.3IONS-SCNC: 13 MMOL/L (ref 4–13)
APTT PPP: 27 SECONDS (ref 24–33)
AST SERPL W P-5'-P-CCNC: 19 U/L (ref 5–45)
BASOPHILS # BLD AUTO: 0.07 THOUSANDS/ΜL (ref 0–0.1)
BASOPHILS NFR BLD AUTO: 1 % (ref 0–1)
BILIRUB SERPL-MCNC: 1.5 MG/DL (ref 0.2–1)
BUN SERPL-MCNC: 18 MG/DL (ref 5–25)
CALCIUM SERPL-MCNC: 9.6 MG/DL (ref 8.3–10.1)
CHLORIDE SERPL-SCNC: 102 MMOL/L (ref 100–108)
CO2 SERPL-SCNC: 26 MMOL/L (ref 21–32)
CREAT SERPL-MCNC: 1.24 MG/DL (ref 0.6–1.3)
EOSINOPHIL # BLD AUTO: 0.21 THOUSAND/ΜL (ref 0–0.61)
EOSINOPHIL NFR BLD AUTO: 3 % (ref 0–6)
ERYTHROCYTE [DISTWIDTH] IN BLOOD BY AUTOMATED COUNT: 13.1 % (ref 11.6–15.1)
GFR SERPL CREATININE-BSD FRML MDRD: 57 ML/MIN/1.73SQ M
GLUCOSE SERPL-MCNC: 128 MG/DL (ref 65–140)
HCT VFR BLD AUTO: 40.5 % (ref 36.5–49.3)
HGB BLD-MCNC: 13.9 G/DL (ref 12–17)
IMM GRANULOCYTES # BLD AUTO: 0.03 THOUSAND/UL (ref 0–0.2)
IMM GRANULOCYTES NFR BLD AUTO: 0 % (ref 0–2)
INR PPP: 1.03 (ref 0.86–1.16)
LYMPHOCYTES # BLD AUTO: 1.62 THOUSANDS/ΜL (ref 0.6–4.47)
LYMPHOCYTES NFR BLD AUTO: 22 % (ref 14–44)
MCH RBC QN AUTO: 32 PG (ref 26.8–34.3)
MCHC RBC AUTO-ENTMCNC: 34.3 G/DL (ref 31.4–37.4)
MCV RBC AUTO: 93 FL (ref 82–98)
MONOCYTES # BLD AUTO: 0.58 THOUSAND/ΜL (ref 0.17–1.22)
MONOCYTES NFR BLD AUTO: 8 % (ref 4–12)
NEUTROPHILS # BLD AUTO: 4.87 THOUSANDS/ΜL (ref 1.85–7.62)
NEUTS SEG NFR BLD AUTO: 66 % (ref 43–75)
NRBC BLD AUTO-RTO: 0 /100 WBCS
PLATELET # BLD AUTO: 199 THOUSANDS/UL (ref 149–390)
PMV BLD AUTO: 8.8 FL (ref 8.9–12.7)
POTASSIUM SERPL-SCNC: 3.3 MMOL/L (ref 3.5–5.3)
PROT SERPL-MCNC: 7.5 G/DL (ref 6.4–8.2)
PROTHROMBIN TIME: 10.8 SECONDS (ref 9.4–11.7)
RBC # BLD AUTO: 4.34 MILLION/UL (ref 3.88–5.62)
SODIUM SERPL-SCNC: 141 MMOL/L (ref 136–145)
TROPONIN I SERPL-MCNC: <0.02 NG/ML
WBC # BLD AUTO: 7.38 THOUSAND/UL (ref 4.31–10.16)

## 2018-10-11 PROCEDURE — 84484 ASSAY OF TROPONIN QUANT: CPT | Performed by: EMERGENCY MEDICINE

## 2018-10-11 PROCEDURE — 93005 ELECTROCARDIOGRAM TRACING: CPT

## 2018-10-11 PROCEDURE — 87081 CULTURE SCREEN ONLY: CPT | Performed by: FAMILY MEDICINE

## 2018-10-11 PROCEDURE — 80053 COMPREHEN METABOLIC PANEL: CPT | Performed by: EMERGENCY MEDICINE

## 2018-10-11 PROCEDURE — 94640 AIRWAY INHALATION TREATMENT: CPT

## 2018-10-11 PROCEDURE — 85025 COMPLETE CBC W/AUTO DIFF WBC: CPT | Performed by: EMERGENCY MEDICINE

## 2018-10-11 PROCEDURE — 36415 COLL VENOUS BLD VENIPUNCTURE: CPT | Performed by: EMERGENCY MEDICINE

## 2018-10-11 PROCEDURE — 74177 CT ABD & PELVIS W/CONTRAST: CPT

## 2018-10-11 PROCEDURE — 99285 EMERGENCY DEPT VISIT HI MDM: CPT

## 2018-10-11 PROCEDURE — 85610 PROTHROMBIN TIME: CPT | Performed by: EMERGENCY MEDICINE

## 2018-10-11 PROCEDURE — 96360 HYDRATION IV INFUSION INIT: CPT

## 2018-10-11 PROCEDURE — 71045 X-RAY EXAM CHEST 1 VIEW: CPT

## 2018-10-11 PROCEDURE — 85730 THROMBOPLASTIN TIME PARTIAL: CPT | Performed by: EMERGENCY MEDICINE

## 2018-10-11 PROCEDURE — 87147 CULTURE TYPE IMMUNOLOGIC: CPT | Performed by: FAMILY MEDICINE

## 2018-10-11 PROCEDURE — 96361 HYDRATE IV INFUSION ADD-ON: CPT

## 2018-10-11 PROCEDURE — 71275 CT ANGIOGRAPHY CHEST: CPT

## 2018-10-11 RX ORDER — PANTOPRAZOLE SODIUM 40 MG/1
40 TABLET, DELAYED RELEASE ORAL
Status: DISCONTINUED | OUTPATIENT
Start: 2018-10-12 | End: 2018-10-13

## 2018-10-11 RX ORDER — IPRATROPIUM BROMIDE AND ALBUTEROL SULFATE 2.5; .5 MG/3ML; MG/3ML
3 SOLUTION RESPIRATORY (INHALATION) ONCE
Status: COMPLETED | OUTPATIENT
Start: 2018-10-11 | End: 2018-10-11

## 2018-10-11 RX ORDER — TAMSULOSIN HYDROCHLORIDE 0.4 MG/1
0.4 CAPSULE ORAL DAILY
Status: DISCONTINUED | OUTPATIENT
Start: 2018-10-12 | End: 2018-10-17 | Stop reason: HOSPADM

## 2018-10-11 RX ORDER — IPRATROPIUM BROMIDE AND ALBUTEROL SULFATE 2.5; .5 MG/3ML; MG/3ML
3 SOLUTION RESPIRATORY (INHALATION)
Status: DISCONTINUED | OUTPATIENT
Start: 2018-10-12 | End: 2018-10-16

## 2018-10-11 RX ORDER — METHYLPREDNISOLONE SODIUM SUCCINATE 40 MG/ML
40 INJECTION, POWDER, LYOPHILIZED, FOR SOLUTION INTRAMUSCULAR; INTRAVENOUS EVERY 12 HOURS SCHEDULED
Status: DISCONTINUED | OUTPATIENT
Start: 2018-10-12 | End: 2018-10-12

## 2018-10-11 RX ORDER — AMLODIPINE BESYLATE 10 MG/1
10 TABLET ORAL DAILY
Status: DISCONTINUED | OUTPATIENT
Start: 2018-10-12 | End: 2018-10-17 | Stop reason: HOSPADM

## 2018-10-11 RX ORDER — IPRATROPIUM BROMIDE AND ALBUTEROL SULFATE 2.5; .5 MG/3ML; MG/3ML
3 SOLUTION RESPIRATORY (INHALATION) EVERY 2 HOUR PRN
Status: DISCONTINUED | OUTPATIENT
Start: 2018-10-11 | End: 2018-10-17 | Stop reason: HOSPADM

## 2018-10-11 RX ORDER — POTASSIUM CHLORIDE 20 MEQ/1
40 TABLET, EXTENDED RELEASE ORAL ONCE
Status: COMPLETED | OUTPATIENT
Start: 2018-10-12 | End: 2018-10-12

## 2018-10-11 RX ORDER — SIMETHICONE 80 MG
80 TABLET,CHEWABLE ORAL 4 TIMES DAILY PRN
Status: DISCONTINUED | OUTPATIENT
Start: 2018-10-11 | End: 2018-10-17 | Stop reason: HOSPADM

## 2018-10-11 RX ORDER — SUCRALFATE ORAL 1 G/10ML
1000 SUSPENSION ORAL
Status: DISCONTINUED | OUTPATIENT
Start: 2018-10-12 | End: 2018-10-17 | Stop reason: HOSPADM

## 2018-10-11 RX ORDER — ONDANSETRON 2 MG/ML
4 INJECTION INTRAMUSCULAR; INTRAVENOUS EVERY 6 HOURS PRN
Status: DISCONTINUED | OUTPATIENT
Start: 2018-10-11 | End: 2018-10-17 | Stop reason: HOSPADM

## 2018-10-11 RX ORDER — PREDNISONE 20 MG/1
60 TABLET ORAL ONCE
Status: COMPLETED | OUTPATIENT
Start: 2018-10-11 | End: 2018-10-11

## 2018-10-11 RX ORDER — FLUTICASONE FUROATE AND VILANTEROL 200; 25 UG/1; UG/1
1 POWDER RESPIRATORY (INHALATION)
Status: DISCONTINUED | OUTPATIENT
Start: 2018-10-12 | End: 2018-10-17 | Stop reason: HOSPADM

## 2018-10-11 RX ADMIN — IOHEXOL 100 ML: 350 INJECTION, SOLUTION INTRAVENOUS at 20:38

## 2018-10-11 RX ADMIN — PREDNISONE 60 MG: 20 TABLET ORAL at 18:50

## 2018-10-11 RX ADMIN — SODIUM CHLORIDE 500 ML: 0.9 INJECTION, SOLUTION INTRAVENOUS at 22:52

## 2018-10-11 RX ADMIN — SODIUM CHLORIDE 1000 ML: 0.9 INJECTION, SOLUTION INTRAVENOUS at 19:52

## 2018-10-11 RX ADMIN — IPRATROPIUM BROMIDE AND ALBUTEROL SULFATE 3 ML: .5; 3 SOLUTION RESPIRATORY (INHALATION) at 18:38

## 2018-10-11 RX ADMIN — IPRATROPIUM BROMIDE AND ALBUTEROL SULFATE 3 ML: .5; 3 SOLUTION RESPIRATORY (INHALATION) at 18:51

## 2018-10-11 NOTE — TELEPHONE ENCOUNTER
Patient called reporting SOB  It was difficult to understand patient on the phone  He denied chest pain but reported "pressure" in his abd  SOB has been off and on but continuous for the past 1 5 hours  Patient has a history of pulmonary nodules, PE and dyspnea  Patient advised to go to the ER  He will go to [facility]  Phone call to [facility] ER and advised that patient is on his way

## 2018-10-11 NOTE — ED NOTES
Dr Perry Thompson informed of positive sepsis screen, at bedside to examine pt     Cheo Sweet RN  10/11/18 7547

## 2018-10-11 NOTE — ED PROCEDURE NOTE
PROCEDURE  ECG 12 Lead Documentation  Date/Time: 10/11/2018 7:44 PM  Performed by: Cheyenne Martinez by: Bri Brothers     ECG reviewed by me, the ED Provider: yes    Patient location:  ED  Interpretation:     Interpretation: abnormal    Quality:     Tracing quality:  Limited by artifact  Rate:     ECG rate:  95    ECG rate assessment: normal    Rhythm:     Rhythm: sinus rhythm    Ectopy:     Ectopy: none    QRS:     QRS axis:  Normal  Conduction:     Conduction: normal    ST segments:     ST segments:  Normal  T waves:     T waves: normal    Q waves:     Q waves:  III, aVF and II         Xi Walton DO  10/1945

## 2018-10-12 LAB
ANION GAP SERPL CALCULATED.3IONS-SCNC: 10 MMOL/L (ref 4–13)
BASOPHILS # BLD AUTO: 0.01 THOUSANDS/ΜL (ref 0–0.1)
BASOPHILS NFR BLD AUTO: 0 % (ref 0–1)
BUN SERPL-MCNC: 16 MG/DL (ref 5–25)
CALCIUM SERPL-MCNC: 8.9 MG/DL (ref 8.3–10.1)
CHLORIDE SERPL-SCNC: 104 MMOL/L (ref 100–108)
CO2 SERPL-SCNC: 23 MMOL/L (ref 21–32)
CREAT SERPL-MCNC: 1.11 MG/DL (ref 0.6–1.3)
EOSINOPHIL # BLD AUTO: 0 THOUSAND/ΜL (ref 0–0.61)
EOSINOPHIL NFR BLD AUTO: 0 % (ref 0–6)
ERYTHROCYTE [DISTWIDTH] IN BLOOD BY AUTOMATED COUNT: 12.9 % (ref 11.6–15.1)
GFR SERPL CREATININE-BSD FRML MDRD: 65 ML/MIN/1.73SQ M
GLUCOSE SERPL-MCNC: 160 MG/DL (ref 65–140)
HCT VFR BLD AUTO: 42.3 % (ref 36.5–49.3)
HGB BLD-MCNC: 14.1 G/DL (ref 12–17)
IMM GRANULOCYTES # BLD AUTO: 0.04 THOUSAND/UL (ref 0–0.2)
IMM GRANULOCYTES NFR BLD AUTO: 1 % (ref 0–2)
L PNEUMO1 AG UR QL IA.RAPID: NEGATIVE
LYMPHOCYTES # BLD AUTO: 0.67 THOUSANDS/ΜL (ref 0.6–4.47)
LYMPHOCYTES NFR BLD AUTO: 11 % (ref 14–44)
MAGNESIUM SERPL-MCNC: 1.9 MG/DL (ref 1.6–2.6)
MCH RBC QN AUTO: 31.2 PG (ref 26.8–34.3)
MCHC RBC AUTO-ENTMCNC: 33.3 G/DL (ref 31.4–37.4)
MCV RBC AUTO: 94 FL (ref 82–98)
MONOCYTES # BLD AUTO: 0.11 THOUSAND/ΜL (ref 0.17–1.22)
MONOCYTES NFR BLD AUTO: 2 % (ref 4–12)
NEUTROPHILS # BLD AUTO: 5.55 THOUSANDS/ΜL (ref 1.85–7.62)
NEUTS SEG NFR BLD AUTO: 86 % (ref 43–75)
NRBC BLD AUTO-RTO: 0 /100 WBCS
PHOSPHATE SERPL-MCNC: 1.9 MG/DL (ref 2.3–4.1)
PLATELET # BLD AUTO: 188 THOUSANDS/UL (ref 149–390)
PMV BLD AUTO: 9 FL (ref 8.9–12.7)
POTASSIUM SERPL-SCNC: 4.3 MMOL/L (ref 3.5–5.3)
RBC # BLD AUTO: 4.52 MILLION/UL (ref 3.88–5.62)
S PNEUM AG UR QL: NEGATIVE
SODIUM SERPL-SCNC: 137 MMOL/L (ref 136–145)
WBC # BLD AUTO: 6.38 THOUSAND/UL (ref 4.31–10.16)

## 2018-10-12 PROCEDURE — 83735 ASSAY OF MAGNESIUM: CPT | Performed by: STUDENT IN AN ORGANIZED HEALTH CARE EDUCATION/TRAINING PROGRAM

## 2018-10-12 PROCEDURE — G8979 MOBILITY GOAL STATUS: HCPCS

## 2018-10-12 PROCEDURE — 97163 PT EVAL HIGH COMPLEX 45 MIN: CPT

## 2018-10-12 PROCEDURE — 94760 N-INVAS EAR/PLS OXIMETRY 1: CPT

## 2018-10-12 PROCEDURE — 99222 1ST HOSP IP/OBS MODERATE 55: CPT | Performed by: INTERNAL MEDICINE

## 2018-10-12 PROCEDURE — 97167 OT EVAL HIGH COMPLEX 60 MIN: CPT

## 2018-10-12 PROCEDURE — 85025 COMPLETE CBC W/AUTO DIFF WBC: CPT | Performed by: STUDENT IN AN ORGANIZED HEALTH CARE EDUCATION/TRAINING PROGRAM

## 2018-10-12 PROCEDURE — 97110 THERAPEUTIC EXERCISES: CPT

## 2018-10-12 PROCEDURE — G8978 MOBILITY CURRENT STATUS: HCPCS

## 2018-10-12 PROCEDURE — 84100 ASSAY OF PHOSPHORUS: CPT | Performed by: STUDENT IN AN ORGANIZED HEALTH CARE EDUCATION/TRAINING PROGRAM

## 2018-10-12 PROCEDURE — 94664 DEMO&/EVAL PT USE INHALER: CPT

## 2018-10-12 PROCEDURE — G8987 SELF CARE CURRENT STATUS: HCPCS

## 2018-10-12 PROCEDURE — 80048 BASIC METABOLIC PNL TOTAL CA: CPT | Performed by: STUDENT IN AN ORGANIZED HEALTH CARE EDUCATION/TRAINING PROGRAM

## 2018-10-12 PROCEDURE — 97535 SELF CARE MNGMENT TRAINING: CPT

## 2018-10-12 PROCEDURE — 94640 AIRWAY INHALATION TREATMENT: CPT

## 2018-10-12 PROCEDURE — 99222 1ST HOSP IP/OBS MODERATE 55: CPT | Performed by: FAMILY MEDICINE

## 2018-10-12 PROCEDURE — G8988 SELF CARE GOAL STATUS: HCPCS

## 2018-10-12 PROCEDURE — 87449 NOS EACH ORGANISM AG IA: CPT | Performed by: STUDENT IN AN ORGANIZED HEALTH CARE EDUCATION/TRAINING PROGRAM

## 2018-10-12 RX ORDER — ZOLPIDEM TARTRATE 5 MG/1
10 TABLET ORAL
Status: DISCONTINUED | OUTPATIENT
Start: 2018-10-12 | End: 2018-10-17 | Stop reason: HOSPADM

## 2018-10-12 RX ORDER — METHYLPREDNISOLONE SODIUM SUCCINATE 40 MG/ML
40 INJECTION, POWDER, LYOPHILIZED, FOR SOLUTION INTRAMUSCULAR; INTRAVENOUS EVERY 8 HOURS SCHEDULED
Status: DISCONTINUED | OUTPATIENT
Start: 2018-10-12 | End: 2018-10-14

## 2018-10-12 RX ADMIN — IPRATROPIUM BROMIDE AND ALBUTEROL SULFATE 3 ML: .5; 3 SOLUTION RESPIRATORY (INHALATION) at 07:33

## 2018-10-12 RX ADMIN — SIMETHICONE CHEW TAB 80 MG 80 MG: 80 TABLET ORAL at 04:34

## 2018-10-12 RX ADMIN — IPRATROPIUM BROMIDE AND ALBUTEROL SULFATE 3 ML: .5; 3 SOLUTION RESPIRATORY (INHALATION) at 15:49

## 2018-10-12 RX ADMIN — PANTOPRAZOLE SODIUM 40 MG: 40 TABLET, DELAYED RELEASE ORAL at 06:34

## 2018-10-12 RX ADMIN — IPRATROPIUM BROMIDE AND ALBUTEROL SULFATE 3 ML: .5; 3 SOLUTION RESPIRATORY (INHALATION) at 04:10

## 2018-10-12 RX ADMIN — IPRATROPIUM BROMIDE AND ALBUTEROL SULFATE 3 ML: .5; 3 SOLUTION RESPIRATORY (INHALATION) at 20:01

## 2018-10-12 RX ADMIN — SIMETHICONE CHEW TAB 80 MG 80 MG: 80 TABLET ORAL at 20:28

## 2018-10-12 RX ADMIN — SUCRALFATE 1000 MG: 1 SUSPENSION ORAL at 22:50

## 2018-10-12 RX ADMIN — SUCRALFATE 1000 MG: 1 SUSPENSION ORAL at 00:50

## 2018-10-12 RX ADMIN — METHYLPREDNISOLONE SODIUM SUCCINATE 40 MG: 40 INJECTION, POWDER, FOR SOLUTION INTRAMUSCULAR; INTRAVENOUS at 00:50

## 2018-10-12 RX ADMIN — SUCRALFATE 1000 MG: 1 SUSPENSION ORAL at 12:00

## 2018-10-12 RX ADMIN — POTASSIUM CHLORIDE 40 MEQ: 1500 TABLET, EXTENDED RELEASE ORAL at 00:50

## 2018-10-12 RX ADMIN — IPRATROPIUM BROMIDE AND ALBUTEROL SULFATE 3 ML: .5; 3 SOLUTION RESPIRATORY (INHALATION) at 11:10

## 2018-10-12 RX ADMIN — ENOXAPARIN SODIUM 40 MG: 40 INJECTION SUBCUTANEOUS at 09:43

## 2018-10-12 RX ADMIN — TAMSULOSIN HYDROCHLORIDE 0.4 MG: 0.4 CAPSULE ORAL at 09:43

## 2018-10-12 RX ADMIN — IPRATROPIUM BROMIDE AND ALBUTEROL SULFATE 3 ML: .5; 3 SOLUTION RESPIRATORY (INHALATION) at 00:29

## 2018-10-12 RX ADMIN — AMLODIPINE BESYLATE 10 MG: 10 TABLET ORAL at 09:43

## 2018-10-12 RX ADMIN — SUCRALFATE 1000 MG: 1 SUSPENSION ORAL at 06:34

## 2018-10-12 RX ADMIN — ZOLPIDEM TARTRATE 10 MG: 5 TABLET, FILM COATED ORAL at 23:38

## 2018-10-12 RX ADMIN — SUCRALFATE 1000 MG: 1 SUSPENSION ORAL at 17:46

## 2018-10-12 RX ADMIN — METHYLPREDNISOLONE SODIUM SUCCINATE 40 MG: 40 INJECTION, POWDER, FOR SOLUTION INTRAMUSCULAR; INTRAVENOUS at 22:50

## 2018-10-12 RX ADMIN — FLUTICASONE FUROATE AND VILANTEROL TRIFENATATE 1 PUFF: 200; 25 POWDER RESPIRATORY (INHALATION) at 09:42

## 2018-10-12 RX ADMIN — METHYLPREDNISOLONE SODIUM SUCCINATE 40 MG: 40 INJECTION, POWDER, FOR SOLUTION INTRAMUSCULAR; INTRAVENOUS at 09:42

## 2018-10-12 NOTE — ASSESSMENT & PLAN NOTE
On arrival, patient admitted to have decreased appetite over a month, feels so weak he can barely get out of bed   -Monitor electrolytes and vitals through hospital stay, replete as appropriate  -PT/OT consulted, current recommendation to return home with family support/possibly services  -Home VNA services set up at time of discharge

## 2018-10-12 NOTE — ASSESSMENT & PLAN NOTE
On admission, patient states he has had worsening SOB especially on exertion for 3 days  On 3L O2 at home  In ED received Prednisone 60 mg PO, Duo-neb 3 ml x2, 1 L NS   -10/11 CXR: Left basilar streaky density possibly subsegmental atelectasis  -10/11 CT: No evidence PE, Several small pulmonary nodules   New left upper lobe pulmonary nodule  F/U CT scan in 6 mo  -Urine strep/legionella negative  -Continue supplemental O2  -Pulmonology consulted, appreciate recommendations   -Solumedrol 20mg q8h   -Continue Breo   -Continue Duonebs q4h scheduled and q2h PRN   -For lung nodules, repeat CT chest outpatient in 6 months  -Tolerated decreased steroids in hospital, per pulmonology okay to discharge home on long steroid taper (60mg daily x3 days, 40mg daily x3 days, 20mg daily x3 days, 10mg daily x3 days, 5mg daily x3 days)  Instructed to hold Incruse while on DuoNebs until follow up with pulmonology in 1 week  Nebulizer refills sent to pharmacy   Patient's nurse was asked to administer his remaining daily solumedrol doses today prior to him leaving the hospital

## 2018-10-12 NOTE — PLAN OF CARE
HEMATOLOGIC - ADULT     Maintains hematologic stability Progressing        METABOLIC, FLUID AND ELECTROLYTES - ADULT     Electrolytes maintained within normal limits Progressing     Fluid balance maintained Progressing        Potential for Falls     Patient will remain free of falls Progressing        RESPIRATORY - ADULT     Achieves optimal ventilation and oxygenation Progressing        SKIN/TISSUE INTEGRITY - ADULT     Skin integrity remains intact Progressing

## 2018-10-12 NOTE — ASSESSMENT & PLAN NOTE
Patientt reports Nausea and mild epigastric discomfort  Recently started on Sucralfate 10 ml QiD w/out relief  On Prilosec at home   States he is due for EGD in January, last EGD was 3/10/17 and it was normal   -Continue sucralfate 1g QID  -Zofran PRN nausea   -GI consulted, recommendations appreciated   -Protonix increased to 40mg BID    -Pepcid 40mg QHS added   -US showed no acute abnormality and hepatomegaly with increased liver echotexture likely hepatic steatosis     -EGD may be done outpatient   -Referral placed for outpatient GI follow up and EGD

## 2018-10-12 NOTE — PHYSICIAN ADVISOR
Current patient class: Inpatient  The patient is currently on Hospital Day: 2 at [address] [place] [address]      The patient was admitted to the hospital at [phone] on 10/11/18 for the following diagnosis:  Difficulty breathing [R06 89]  COPD exacerbation ([address] ) [J44 1]       There is documentation in the medical record of an expected length of stay of at least 2 midnights  The patient is therefore expected to satisfy the 2 midnight benchmark and given the 2 midnight presumption is appropriate for INPATIENT ADMISSION  Given this expectation of a satisfying stay, CMS instructs us that the patient is most often appropriate for inpatient admission under part A provided medical necessity is documented in the chart  After review of the relevant documentation, labs, vital signs and test results, the patient is appropriate for INPATIENT ADMISSION  Admission to the hospital as an inpatient is a complex decision making process which requires the practitioner to consider the patients presenting complaint, history and physical examination and all relevant testing  With this in mind, in this case, the patient was deemed appropriate for INPATIENT ADMISSION  After review of the documentation and testing available at the time of the admission I concur with this clinical determination of medical necessity  Rationale is as follows: The patient is a 67-year-old male who presented to the hospital on October 11, 2018 with dyspnea on exertion and worsened shortness of breath  He has chronic hypoxic respiratory failure  The patient received prednisone in the emergency department and was later changed to Solu-Medrol intravenous twice daily  The patient is described as so weak that he could barely get out of bed  He remained tachypneic with respiratory rate 20 to 32  He has underlying alpha one antitrypsin deficiency    The patient was assigned inpatient status anticipating a greater than two midnight hospitalization due to the severity of his symptoms  Inpatient level care was appropriate based on this anticipated length of stay along with symptoms requiring treatment with intravenous steroids  The patients vitals on arrival were ED Triage Vitals [10/11/18 1802]   Temperature Pulse Respirations Blood Pressure SpO2   97 5 °F (36 4 °C) 99 22 (!) 179/84 97 %      Temp Source Heart Rate Source Patient Position - Orthostatic VS BP Location FiO2 (%)   Oral Monitor Lying Left arm --      Pain Score       No Pain           Past Medical History:   Diagnosis Date    Anesthesia complication     Difficult to wake up    Arthritis     BPH (benign prostatic hyperplasia)     urinary frequency    Cancer (HCC)     basal cell neck, face    COPD (chronic obstructive pulmonary disease) (HCC)     Full dentures     Hypertension     controlled    Kidney stone     at least 7 episodes    Liver disease     Alpha 1- enzyme deficiency - diagnosed 2002  has been on weekly replacement therapy since then    Pulmonary emphysema ([facility] [address] )     1/25/15  FEV1 - 2 45 liters or 59% of predicted    Wears glasses     for driving only     Past Surgical History:   Procedure Laterality Date    BACK SURGERY  2008    discectomy    COLONOSCOPY      COLONOSCOPY N/A 3/10/2017    Procedure: Marylen Cheek;  Surgeon: Sharifa Saenz MD;  Location: [facility] GI LAB; Service:    Rubens Boy      removal of kidney stones    ESOPHAGOGASTRODUODENOSCOPY N/A 3/10/2017    Procedure: ESOPHAGOGASTRODUODENOSCOPY (EGD); Surgeon: Sharifa Saenz MD;  Location: [facility] GI LAB;   Service:     LITHOTRIPSY      TONSILLECTOMY      VEIN LIGATION AND STRIPPING Bilateral     1980's           Consults have been placed to:   IP CONSULT TO CASE MANAGEMENT  IP CONSULT TO PULMONOLOGY    Vitals:    10/12/18 0441 10/12/18 0734 10/12/18 1110 10/12/18 1343   BP: 148/73 145/76  141/79   BP Location: Left arm Left arm  Right arm   Pulse: 71 79  76   Resp: 18 18  20   Temp: 98 °F (36 7 °C) 98 °F (36 7 °C)  98 7 °F (37 1 °C)   TempSrc: Oral Oral  Oral   SpO2: 97% 96% 92% 97%   Weight:       Height:           Most recent labs:    Recent Labs      10/11/18   1839  10/12/18   0447   WBC  7 38  6 38   HGB  13 9  14 1   HCT  40 5  42 3   PLT  199  188   K  3 3*  4 3   NA  141  137   CALCIUM  9 6  8 9   BUN  18  16   CREATININE  1 24  1 11   INR  1 03   --    TROPONINI  <0 02   --    AST  19   --    ALT  36   --    ALKPHOS  71   --        Scheduled Meds:  Current Facility-Administered Medications:  amLODIPine 10 mg Oral Daily Montez Del Toro MD   enoxaparin 40 mg Subcutaneous Daily Montez Del Toro MD   fluticasone-vilanterol 1 puff Inhalation Daily Montez Del Toro MD   ipratropium-albuterol 3 mL Nebulization Q4H Montez Del Toro MD   ipratropium-albuterol 3 mL Nebulization Q2H PRN Montez Del Toro MD   methylPREDNISolone sodium succinate 40 mg Intravenous Q12H [facility] Montez Del Toro MD   ondansetron 4 mg Intravenous Q6H PRN Montez Del Toro MD   pantoprazole 40 mg Oral Early Morning Montez Del Toro MD   simethicone 80 mg Oral 4x Daily PRN Montez Del Toro MD   sucralfate 1,000 mg Oral 4x Daily (AC & HS) Montez Del Toro MD   tamsulosin 0 4 mg Oral Daily Montez Del Toro MD     Continuous Infusions:   PRN Meds: ipratropium-albuterol    ondansetron    simethicone    Surgical procedures (if appropriate):

## 2018-10-12 NOTE — OCCUPATIONAL THERAPY NOTE
Occupational Therapy Evaluation/Treatment     10/12/18 0905   Note Type   Note type Eval/Treat   Restrictions/Precautions   Other Precautions O2;Fall Risk   Pain Assessment   Pain Assessment 0-10   Pain Score 2   Pain Location Abdomen  ("upset stomach")   Home Living   Type of [address] [place] [address] One level  (2 TEMI)   Home Equipment Walker   Additional Comments pt reports independent with ADLS and functional mobility prior to admission  patient drives and grocery shops  Patient has home O2 3L 24/7  Patient reports limited endurance with breathing     Prior Function   Level of [place] Independent with ADLs and functional mobility   Lives With Alone   ADL Assistance Independent   IADLs Independent   ADL   Eating Assistance 7  Independent   Grooming Assistance 7  Independent   UB Bathing Assistance 7  Independent   LB Bathing Assistance 4  Minimal Assistance   UB Dressing Assistance 7  Independent   LB Dressing Assistance 4  [address]  4  Minimal Assistance   Bed Mobility   Supine to Sit 5  Supervision   Transfers   Sit to Stand 4  Minimal assistance   Stand to Sit 4  Minimal assistance   Stand pivot 4  Minimal assistance   Functional Mobility   Functional Mobility 4  Minimal assistance   Additional Comments 20 feet, unsteady gait reaching for furniture   Balance   Static Sitting Fair   Dynamic Sitting Fair   Static Standing Poor +   Dynamic Standing Poor +   Activity Tolerance   Activity Tolerance Patient limited by fatigue  (SOB)   RUE Assessment   RUE Assessment WFL  (4-/5)   LUE Assessment   LUE Assessment WFL  (4-/5)   Sensation   Light Touch Partial deficits in the RUE;Partial deficits in the LUE;Partial deficits in the RLE;Partial deficits in the LLE  (pt reports tingling in hands, numbness in feet )   Cognition   Overall Cognitive Status WFL   Arousal/Participation Cooperative   Attention Within functional limits   Orientation Level Oriented X4   Following Commands Follows all commands and directions without difficulty   Assessment   Limitation Decreased ADL status; Decreased UE strength;Decreased Safe judgement during ADL;Decreased endurance;Decreased self-care trans;Decreased high-level ADLs  (decreased balance and mobility )   Prognosis Good   Assessment Patient evaluated by Occupational Therapy  Patient admitted with Dyspnea on exertion  The patients occupational profile, medical and therapy history includes a extensive additional review of physical, cognitive, or psychosocial history related to current functional performance  Comorbidities affecting functional mobility and ADLS include: arthritis, COPD, hypertension and cancer  Prior to admission, patient was independent with functional mobility without assistive device, independent with ADLS and independent with IADLS  The evaluation identifies the following performance deficits: weakness, impaired balance, decreased endurance, increased fall risk, new onset of impairment of functional mobility, decreased ADLS, decreased IADLS, pain, decreased activity tolerance, decreased safety awareness, SOB upon exertion and decreased strength, that result in activity limitations and/or participation restrictions  This evaluation requires clinical decision making of high complexity, because the patient presents with comorbidites that affect occupational performance and required significant modification of tasks or assistance with consideration of multiple treatment options  The Barthel Index was used as a functional outcome tool presenting with a score of 55, indicating marked limitations of functional mobility and ADLS  Patient will benefit from skilled Occupational Therapy services to address above deficits and facilitate a safe return to prior level of function     Goals   Patient Goals to go home, breathe better   STG Time Frame (1-7 days)   Short Term Goal  Goals established to promote patient goal of going home, breathing better:  Patient will increase standing tolerance to 3 minutes during ADL task to decrease assistance level and decrease fall risk; Patient will increase bed mobility to independent in preparation for ADLS and transfers; Patient will increase functional mobility to and from bathroom with rolling walker with supervision to increase performance with ADLS and to use a toilet; Patient will tolerate 10 minutes of UE ROM/strengthening to increase general activity tolerance and performance in ADLS/IADLS; Patient will improve functional activity tolerance to 10 minutes of sustained functional tasks to increase participation in basic self-care and decrease assistance level;  Patient will be able to to verbalize understanding and perform energy conservation/proper body mechanics during ADLS and functional mobility at least 75% of the time with minimal cueing to decrease signs of fatigue and increase stamina to return to prior level of function; Patient will increase dynamic sitting balance to fair+ to improve the ability to sit at edge of bed or on a chair for ADLS;  Patient will increase dynamic standing balance to fair- to improve postural stability and decrease fall risk during standing ADLS and transfers       LTG Time Frame (8-14 days)   Long Term Goal Goals established to promote patient goal of going home, breathing better:  Patient will increase standing tolerance to 6 minutes during ADL task to decrease assistance level and decrease fall risk;  Patient will increase functional mobility to and from bathroom with rolling walker independently to increase performance with ADLS and to use a toilet; Patient will tolerate 20 minutes of UE ROM/strengthening to increase general activity tolerance and performance in ADLS/IADLS; Patient will improve functional activity tolerance to 20 minutes of sustained functional tasks to increase participation in basic self-care and decrease assistance level;  Patient will be able to to verbalize understanding and perform energy conservation/proper body mechanics during ADLS and functional mobility at least 90% of the time withoutcueing to decrease signs of fatigue and increase stamina to return to prior level of function; Patient will increase dynamic sitting balance to good to improve the ability to sit at edge of bed or on a chair for ADLS;  Patient will increase dynamic standing balance to fair to improve postural stability and decrease fall risk during standing ADLS and transfers  Functional Transfer Goals   Pt Will Perform All Functional Transfers (STG supervision LTG independent )   ADL Goals   Pt Will Perform Bathing (STG supervision LTG independent )   Pt Will Perform LE Dressing (STG supervision LTG independent )   Pt Will Perform Toileting (STG supervision LTG independent )   Plan   Treatment Interventions ADL retraining;Functional transfer training;UE strengthening/ROM; Endurance training;Patient/family training;Equipment evaluation/education; Activityengagement; Energy conservation; Compensatory technique education   Goal Expiration Date 10/26/18   Treatment Day 1   OT Frequency 3-5x/wk   Additional Treatment Session   Start Time 0855   End Time 0905   Treatment Assessment Patient completed toilet transfer with min assist   Functional mobility with RW min assist 20 feet  SpO2 96% with O2 after mobility but pt with symptoms of SOB  Instructed pt on pursed lip breathing and energy conservation techniques  Patient verbalized and demonstrated understanding and was receptive  Patient is SOB/fatigued with activity  Patients balance improved with RW      Recommendation   OT Discharge Recommendation (home with services)   Barthel Index   Feeding 10   Bathing 0   Grooming Score 5   Dressing Score 5   Bladder Score 10   Bowels Score 10   Toilet Use Score 5   Transfers (Bed/Chair) Score 10   Mobility (Level Surface) Score 0   Stairs Score 0   Barthel Index Score 55   Licensure   [place] License Number  John Hair MS OTR/L [id number]

## 2018-10-12 NOTE — SOCIAL WORK
DASH discussion completed  Discussed goals of making sure pt's needs are met upon discharge, pt's preferences are taken into account, pt understands her health condition, medications and symptoms to watch for after returning home and pt is aware of any follow up appointments recommended by hospital physician  Spoke with the pt at the bedside  Pt lives alone and has a cane and walker at home  Pt has [facility] for OT/PT but no longer needs it  Pt does drive and uses the [facility] in [place], [place]  Pt is a [facility] pt, booklet discussed and provided

## 2018-10-12 NOTE — ED NOTES
Pt brought to restroom and back by wheelchair with oxygen on, pt still very short of breath  Dr Shannan Park aware       Erica Gracia, EMY  10/11/18 8165

## 2018-10-12 NOTE — UTILIZATION REVIEW
Initial Clinical Review    Admission: Date/Time/Statement: 10/11/18 @ 2207     Orders Placed This Encounter   Procedures    Inpatient Admission (expected length of stay for this patient is greater than two midnights)     Standing Status:   Standing     Number of Occurrences:   1     Order Specific Question:   Admitting Physician     Answer:   Alix Gregorio     Order Specific Question:   Level of Care     Answer:   Med Surg [16]     Order Specific Question:   Estimated length of stay     Answer:   More than 2 Midnights     Order Specific Question:   Certification     Answer:   I certify that inpatient services are medically necessary for this patient for a duration of greater than two midnights  See H&P and MD Progress Notes for additional information about the patient's course of treatment  ED: Date/Time/Mode of Arrival:   ED Arrival Information     Expected Arrival Acuity Means of Arrival Escorted By Service Admission Type    - 10/11/2018 17:55 Emergent Walk-In Self General Medicine Emergency    Arrival Complaint    diff breathing          Chief Complaint:   Chief Complaint   Patient presents with    Shortness of Breath     shortness of breath worsening today, on 3 liters oxygen per NC continuously       History of Illness: Aislinn Crowder is a 75 y o  male  presents to the ED c/o SOB and generalized weakness since the past 3 days  He states that he was in his usual state of health till then, and uses 3 L O2 at home, but has been finding it difficult to get out of bed since that time  He also states that he has severe shortness of breath even after walking just a few steps and improves when he lays down  He also complains of epigastric discomfort that feels like " something is just constantly pressing on my stomach", and heartburn  He was seen by May Cox on 10/2 who added Sucralfate in addition to his Prilosec 40 mg, but has had minimal relief    Patient states that he has been nauseous for a long time and has had a reduced appetite for the past month    ED Vital Signs:   ED Triage Vitals [10/11/18 1802]   Temperature Pulse Respirations Blood Pressure SpO2   97 5 °F (36 4 °C) 99 22 (!) 179/84 97 %      Temp Source Heart Rate Source Patient Position - Orthostatic VS BP Location FiO2 (%)   Oral Monitor Lying Left arm --      Pain Score       No Pain        Wt Readings from Last 1 Encounters:   10/11/18 78 4 kg (172 lb 13 5 oz)       Vital Signs (abnormal):   10/11/18 2230 -- 80  24 146/85 96 % -- JL   10/11/18 2221 98 5 °F (36 9 °C) 80  23 170/82 97 % -- J   10/11/18 2200 --  108  32  194/84 97 % -- J   10/11/18 2046 -- 82  23 164/76 96 % -- J   10/1945 -- 90  24 162/85 97 %       Abnormal Labs/Diagnostic Test Results: K  3 3, total bili   1 50  CT abd- No evidence of pulmonary embolus  Several small pulmonary nodules   New left upper lobe pulmonary nodule  PCXR-     Left basilar streaky density possibly subsegmental atelectasis  ED Treatment:   Medication Administration from 10/11/2018 1755 to 10/11/2018 2250       Date/Time Order Dose Route Action Action by Comments     10/11/2018 1850 predniSONE tablet 60 mg 60 mg Oral Given Nancy Joseph, RN      10/11/2018 1851 ipratropium-albuterol (DUO-NEB) 0 5-2 5 mg/3 mL inhalation solution 3 mL 3 mL Nebulization Given Nancy Deep, EMY      10/11/2018 1838 ipratropium-albuterol (DUO-NEB) 0 5-2 5 mg/3 mL inhalation solution 3 mL 3 mL Nebulization Given Nancy Jake, EMY      10/11/2018 1952 sodium chloride 0 9 % bolus 1,000 mL 1,000 mL Intravenous Tltroman 37 Nancy Joseph, EMY      10/11/2018 2038 iohexol (OMNIPAQUE) 350 MG/ML injection (MULTI-DOSE) 100 mL 100 mL Intravenous Given Sabrina Murillo           Past Medical/Surgical History:    Active Ambulatory Problems     Diagnosis Date Noted    AAT (alpha-1-antitrypsin) deficiency ([facility] [address] ) 12/30/2017    Causalgia of lower limb 12/30/2017    Essential hypertension 12/30/2017  Gastroesophageal reflux disease without esophagitis 01/27/2014    Dyspnea on exertion 12/30/2017    HTN (hypertension) 12/30/2017    BPH (benign prostatic hyperplasia) 12/30/2017    Liver disease     Lung nodules 01/31/2018    Former smoker 01/31/2018    Chest pain at rest 02/13/2018    Pulmonary embolism ([facility] [address] ) 02/14/2018   [facility] discharge follow-up 02/23/2018    Chronic hypoxemic respiratory failure ([facility] [address] ) 03/29/2018    Centrilobular emphysema ([address] ) 04/02/2018    Lightheadedness 05/24/2018       Past Medical History:   Diagnosis Date    Anesthesia complication     Arthritis     BPH (benign prostatic hyperplasia)     Cancer (HCC)     COPD (chronic obstructive pulmonary disease) (HCC)     Full dentures     Hypertension     Kidney stone     Liver disease     Pulmonary emphysema ([address] )     Wears glasses        Admitting Diagnosis: Difficulty breathing [R06 89]  COPD exacerbation ([address] ) [J44 1]    Age/Sex: 75 y o  male    Assessment/Plan:   * Dyspnea on exertion   Assessment & Plan     - Pt states he has had worsening SOB especially on exertion since 3 days  He is on 3L O2 at home   -ED:Prednisone 60 mg PO, Duo-neb 3 ml x2, 1 L NS  -CXR: Left basilar streaky density possibly subsegmental atelectasis  -CT: No evidence PE, Several small pulmonary nodules   New left upper lobe pulmonary nodule  Follow-up CT scan in 3-6 months is recommended   -Consult Pulmonology: Appreciate recommendations  -Continue on 3L O2  - Duo-Neb q4h scheduled with peak flow, and Duo-Neb q2h PRN  -Solumedrol 40 mg IV BiD   -urine strep and legionella     -continue home inhaler PRN  -prednisone taper on D/c      Weakness generalized   Assessment & Plan     - Pt states to have decreased appetite over a month, feels so weak he can barely get out of bed  - Vitals on arrival: 170/92, pulse 80, temp 98 5, resp 23, 97%  -K+ 3 3, will replete with K-dur 40 mEq PO    -will continue to monitor electrolytes and vitals through hospital stay  - Consult PT/OT    AAT (alpha-1-antitrypsin) deficiency (HCC)   Assessment & Plan     - Continue Zemaira Weekly outpatient    Gastroesophageal reflux disease without esophagitis   Assessment & Plan     - Pt reports Nausea and mild epigastric discomfort  -nausea: Zofran 4 mg IV PRN  -Recently started on Sucralfate 10 ml QiD   Patient taking Prilosec 40 mg qd  -States he is due for upper GI endoscopy in January  - Continue home meds    Lung nodules   Assessment & Plan     -CT: Several small pulmonary nodules are again visualized   Right upper lobe nodule measuring 8 mm is unchanged   4 mm right lower lobe nodule is noted   2 mm left lower lobe nodule is unchanged   Several adjacent   triangular-shaped right lower lobe   Visual nodules are again visualized   Bulla is seen in the right lower lobe  Zack Patterson is a new left upper lobe pulmonary nodule measuring 5 mm  -Continue O/p follow up with Dr Long       HTN (hypertension)   Assessment & Plan     -/92  - Continue home med: Amlodipine 10 mg qd  - will monitor BP      BPH (benign prostatic hyperplasia)   Assessment & Plan     - Reports no changes in urination   -Continue home med: Flomax 0 4 mg qd         GLOBAL:  IVF: heplock  SCDs Lovenox  Diet: Regular  Replete electrolytes as needed      Admission Orders:  Scheduled Meds:   Current Facility-Administered Medications:  amLODIPine 10 mg Oral Daily    enoxaparin 40 mg Subcutaneous Daily    fluticasone-vilanterol 1 puff Inhalation Daily    ipratropium-albuterol 3 mL Nebulization Q4H    ipratropium-albuterol 3 mL Nebulization Q2H PRN    methylPREDNISolone sodium succinate 40 mg Intravenous Q12H CORINA    ondansetron 4 mg Intravenous Q6H PRN    pantoprazole 40 mg Oral Early Morning    simethicone 80 mg Oral 4x Daily PRN    sucralfate 1,000 mg Oral 4x Daily (AC & HS)    tamsulosin 0 4 mg Oral Daily      Reg diet   SCD  pulm consult   OT PT eval   Tele   10/12   cbc , phos, mg , bmp , strep pneumoniae, legionella antigen   Gluc 160 ,phos 1 9

## 2018-10-12 NOTE — H&P
H&P Exam - Mario Alberto Lockett 75 y o  male MRN: [id number]    Unit/Bed#: 2 [place] [address] Encounter: [id number]      76 y/o Male with PMH Alpha-1-Antitrypsin deficiency (2001), HTN, COPD, BPH, Hx of Lung nodules( 1/2018), Hx of PE, presents to the ED c/o SOB and generalized weakness since the past 3 days  He will be admitted to general medical floor under the Family Medicine service of [facility]  Expected LOS greater than 2 midnights  Patient is full code and will be discharged to home  Assessment/Plan     * Dyspnea on exertion   Assessment & Plan    - Pt states he has had worsening SOB especially on exertion since 3 days  He is on 3L O2 at home   -ED:Prednisone 60 mg PO, Duo-neb 3 ml x2, 1 L NS  -CXR: Left basilar streaky density possibly subsegmental atelectasis  -CT: No evidence PE, Several small pulmonary nodules   New left upper lobe pulmonary nodule  Follow-up CT scan in 3-6 months is recommended   -Consult Pulmonology: Appreciate recommendations  -Continue on 3L O2  - Duo-Neb q4h scheduled with peak flow, and Duo-Neb q2h PRN  -Solumedrol 40 mg IV BiD   -urine strep and legionella  -continue home inhaler PRN  -prednisone taper on D/c     Weakness generalized   Assessment & Plan    - Pt states to have decreased appetite over a month, feels so weak he can barely get out of bed  - Vitals on arrival: 170/92, pulse 80, temp 98 5, resp 23, 97%  -K+ 3 3, will replete with K-dur 40 mEq PO    -will continue to monitor electrolytes and vitals through hospital stay  - Consult PT/OT     AAT (alpha-1-antitrypsin) deficiency ([facility])   Assessment & Plan    - Continue Zemaira Weekly outpatient     Gastroesophageal reflux disease without esophagitis   Assessment & Plan    - Pt reports Nausea and mild epigastric discomfort  -nausea: Zofran 4 mg IV PRN  -Recently started on Sucralfate 10 ml QiD   Patient taking Prilosec 40 mg qd  -States he is due for upper GI endoscopy in January  - Continue home meds       Lung nodules Assessment & Plan    -CT: Several small pulmonary nodules are again visualized   Right upper lobe nodule measuring 8 mm is unchanged   4 mm right lower lobe nodule is noted   2 mm left lower lobe nodule is unchanged   Several adjacent   triangular-shaped right lower lobe   Visual nodules are again visualized  Erika Doty is seen in the right lower lobe  Scooby Hall is a new left upper lobe pulmonary nodule measuring 5 mm  -Continue O/p follow up with Dr Long      HTN (hypertension)   Assessment & Plan    -/92  - Continue home med: Amlodipine 10 mg qd  - will monitor BP     BPH (benign prostatic hyperplasia)   Assessment & Plan    - Reports no changes in urination   -Continue home med: Flomax 0 4 mg qd       GLOBAL:  IVF: heplock  SCDs Lovenox  Diet: Regular  Replete electrolytes as needed      History of Present Illness      HPI:  Crissy Moore is a 75 y o  male with PMH Alpha-1-Antitrypsin deficiency (2001), HTN, COPD, BPH, Hx of Lung nodules( 1/2018), Hx of PE, presents to the ED c/o SOB and generalized weakness since the past 3 days  He states that he was in his usual state of health till then, and uses 3 L O2 at home, but has been finding it difficult to get out of bed since that time  He also states that he has severe shortness of breath even after walking just a few steps and improves when he lays down  He also complains of epigastric discomfort that feels like " something is just constantly pressing on my stomach", and heartburn  He was seen by Maicol Wilder on 10/2 who added Sucralfate in addition to his Prilosec 40 mg, but has had minimal relief  Patient states that he has been nauseous for a long time and has had a reduced appetite for the past month  He states that he his bowel movements are almost always loose for the past 30 years  Denies Fever, chills, Vomit, constipation, changes in urination, recent illnesses, and Chest pain  He also notes that his arms have numbness and tingling    Patient states he had a similar episode in April  He gets weekly Zemaira on Tues for A1AT  ED course: Prednisone 60 mg PO, Duo-neb 3 ml x2, 1 L NS  CXR: Left basilar streaky density possibly subsegmental atelectasis  CT: No evidence PE, Several small pulmonary nodules   New left upper lobe pulmonary nodule  Follow-up CT scan in 3-6 months is recommended  Review of Systems SEE HPI    Historical Information   Past Medical History:   Diagnosis Date    Anesthesia complication     Difficult to wake up    Arthritis     BPH (benign prostatic hyperplasia)     urinary frequency    Cancer (HCC)     basal cell neck, face    COPD (chronic obstructive pulmonary disease) (HCC)     Full dentures     Hypertension     controlled    Kidney stone     at least 7 episodes    Liver disease     Alpha 1- enzyme deficiency - diagnosed 2002  has been on weekly replacement therapy since then    Pulmonary emphysema ([address] )     1/25/15  FEV1 - 2 45 liters or 59% of predicted    Wears glasses     for driving only     Past Surgical History:   Procedure Laterality Date    BACK SURGERY  2008    discectomy    COLONOSCOPY      COLONOSCOPY N/A 3/10/2017    Procedure: Robb Crum;  Surgeon: Poli Sage MD;  Location: [facility] GI LAB; Service:    January Field      removal of kidney stones    ESOPHAGOGASTRODUODENOSCOPY N/A 3/10/2017    Procedure: ESOPHAGOGASTRODUODENOSCOPY (EGD); Surgeon: Poli Sage MD;  Location: [facility] GI LAB;   Service:     LITHOTRIPSY      TONSILLECTOMY      VEIN LIGATION AND STRIPPING Bilateral     1980's     Social History   History   Alcohol Use No     History   Drug Use No     History   Smoking Status    Former Smoker    Packs/day: 1 00    Years: 60 00    Quit date: 8/31/2017   Smokeless Tobacco    Never Used     Comment: quit in august 2017     Family History:   Family History   Problem Relation Age of Onset    Emphysema Mother         never smoked    Emphysema Father     Cancer Brother colon    Colon cancer Brother     Ulcerative colitis Family     Liver disease Family        Meds/Allergies   PTA meds:   Prior to Admission Medications   Prescriptions Last Dose Informant Patient Reported? Taking?    Alpha1-Proteinase Inhibitor (ZEMAIRA IV)  Self Yes No   Sig: Infuse into a venous catheter once a week On tuesday -1000mg /50 ml    INCRUSE ELLIPTA 62 5 MCG/INH AEPB inhaler   No No   Sig: INHALE ONE PUFF BY MOUTH ONE TIME DAILY    ZEMAIRA injection  Self Yes No   albuterol (2 5 mg/3 mL) 0 083 % nebulizer solution Past Week at Unknown time Self Yes Yes   Sig: Inhale 1 each every 4 (four) hours as needed   albuterol (PROVENTIL HFA,VENTOLIN HFA) 90 mcg/act inhaler  Self No No   Sig: Inhale 2 puffs every 6 (six) hours as needed for wheezing   amLODIPine (NORVASC) 10 mg tablet   No No   Sig: TAKE ONE TABLET BY MOUTH ONE TIME DAILY    amLODIPine (NORVASC) 5 mg tablet Not Taking at Unknown time Self No No   Sig: Take 1 tablet (5 mg total) by mouth daily   Patient not taking: Reported on 10/2/2018    apixaban (ELIQUIS) 5 mg Not Taking at Unknown time Self No No   Sig: Take 1 tablet (5 mg total) by mouth 2 (two) times a day   Patient not taking: Reported on 10/2/2018    finasteride (PROSCAR) 5 mg tablet  Self Yes No   Sig: Take 5 mg by mouth daily     fluticasone (FLONASE) 50 mcg/act nasal spray   No No   Si sprays into each nostril daily   fluticasone-salmeterol (ADVAIR DISKUS) 250-50 mcg/dose inhaler  Self Yes No   Sig: Inhale 1 puff 2 (two) times a day     meclizine (ANTIVERT) 12 5 MG tablet Not Taking at Unknown time Self Yes No   Sig: Take 1 tablet by mouth daily   omeprazole (PriLOSEC) 40 MG capsule   No No   Sig: TAKE ONE CAPSULE BY MOUTH TWICE DAILY    sucralfate (CARAFATE) 1 g/10 mL suspension   No No   Sig: Take 10 mL (1 g total) by mouth 4 (four) times a day   tamsulosin (FLOMAX) 0 4 mg   No No   Sig: TAKE ONE CAPSULE BY MOUTH ONE TIME DAILY    theophylline (ESTEFANIA-24) 400 MG 24 hr capsule  Self No No   Sig: Take 1 capsule (400 mg total) by mouth daily   zolpidem (AMBIEN CR) 6 25 MG CR tablet Not Taking at Unknown time Self Yes No   Sig: Take 10 mg by mouth daily at bedtime as needed for sleep   zolpidem (AMBIEN) 10 mg tablet   No No   Sig: TAKE ONE TABLET BY MOUTH AT BEDTIME       Facility-Administered Medications: None     Allergies   Allergen Reactions    Chantix [Varenicline]      Caused prostate infection       Objective   Vitals: Blood pressure 166/77, pulse 82, temperature 98 4 °F (36 9 °C), temperature source Oral, resp  rate (!) 28, height 6' 5" (1 956 m), weight 78 4 kg (172 lb 13 5 oz), SpO2 97 %  No intake or output data in the 24 hours ending 10/12/18 0010    Invasive Devices     Peripheral Intravenous Line            Peripheral IV 10/11/18 Right Antecubital less than 1 day                Physical Exam   Constitutional: He is oriented to person, place, and time  He appears well-developed and well-nourished  HENT:   Head: Normocephalic and atraumatic  Cardiovascular: Normal rate, regular rhythm and normal heart sounds  No murmur heard  Pulmonary/Chest: Breath sounds normal  He is in respiratory distress  He has no wheezes  He exhibits no tenderness    -Patient taking forceful inspirations   Abdominal: Soft  Bowel sounds are normal  He exhibits no mass  There is tenderness  There is no rebound  Mild tenderness in epigastrium   Musculoskeletal:   Strength NL    Neurological: He is alert and oriented to person, place, and time  Skin: Skin is warm  Lab Results: I have personally reviewed pertinent reports       Results for orders placed or performed during the hospital encounter of 10/11/18   CBC and differential   Result Value Ref Range    WBC 7 38 4 31 - 10 16 Thousand/uL    RBC 4 34 3 88 - 5 62 Million/uL    Hemoglobin 13 9 12 0 - 17 0 g/dL    Hematocrit 40 5 36 5 - 49 3 %    MCV 93 82 - 98 fL    MCH 32 0 26 8 - 34 3 pg    MCHC 34 3 31 4 - 37 4 g/dL    RDW 13 1 11 6 - 15 1 % MPV 8 8 (L) 8 9 - 12 7 fL    Platelets 075 904 - 922 Thousands/uL    nRBC 0 /100 WBCs    Neutrophils Relative 66 43 - 75 %    Immat GRANS % 0 0 - 2 %    Lymphocytes Relative 22 14 - 44 %    Monocytes Relative 8 4 - 12 %    Eosinophils Relative 3 0 - 6 %    Basophils Relative 1 0 - 1 %    Neutrophils Absolute 4 87 1 85 - 7 62 Thousands/µL    Immature Grans Absolute 0 03 0 00 - 0 20 Thousand/uL    Lymphocytes Absolute 1 62 0 60 - 4 47 Thousands/µL    Monocytes Absolute 0 58 0 17 - 1 22 Thousand/µL    Eosinophils Absolute 0 21 0 00 - 0 61 Thousand/µL    Basophils Absolute 0 07 0 00 - 0 10 Thousands/µL   Protime-INR   Result Value Ref Range    Protime 10 8 9 4 - 11 7 seconds    INR 1 03 0 86 - 1 16   APTT   Result Value Ref Range    PTT 27 24 - 33 seconds   Comprehensive metabolic panel   Result Value Ref Range    Sodium 141 136 - 145 mmol/L    Potassium 3 3 (L) 3 5 - 5 3 mmol/L    Chloride 102 100 - 108 mmol/L    CO2 26 21 - 32 mmol/L    ANION GAP 13 4 - 13 mmol/L    BUN 18 5 - 25 mg/dL    Creatinine 1 24 0 60 - 1 30 mg/dL    Glucose 128 65 - 140 mg/dL    Calcium 9 6 8 3 - 10 1 mg/dL    AST 19 5 - 45 U/L    ALT 36 12 - 78 U/L    Alkaline Phosphatase 71 46 - 116 U/L    Total Protein 7 5 6 4 - 8 2 g/dL    Albumin 4 2 3 5 - 5 0 g/dL    Total Bilirubin 1 50 (H) 0 20 - 1 00 mg/dL    eGFR 57 ml/min/1 73sq m   Troponin I   Result Value Ref Range    Troponin I <0 02 <=0 04 ng/mL       Imaging: I have personally reviewed pertinent reports  XR chest 1 view portable   Final Result      Left basilar streaky density possibly subsegmental atelectasis  Workstation performed: [id number]         CTA chest ct abdomen pelvis w contrast   Final Result      No evidence of pulmonary embolus      Several small pulmonary nodules  New left upper lobe pulmonary nodule  Follow-up CT scan in 3-6 months is recommended  The study was marked in EPIC for significant notification           Workstation performed: [id number] EKG, Pathology, and Other Studies: I have personally reviewed pertinent reports  Code Status: Level 1: Full Code      Counseling / Coordination of Care  Total floor / unit time spent today 30 minutes  Greater than 50% of total time was spent with the patient and / or family counseling and / or coordination of care

## 2018-10-12 NOTE — ASSESSMENT & PLAN NOTE
10/11/18 CT: Several small pulmonary nodules are again visualized   Right upper lobe nodule measuring 8 mm is unchanged   4 mm right lower lobe nodule is noted   2 mm left lower lobe nodule is unchanged   Several adjacent   triangular-shaped right lower lobe   Visual nodules are again visualized  Erika Doty is seen in the right lower lobe  Scooby Hall is a new left upper lobe pulmonary nodule measuring 5 mm  -Continue O/p follow up with Dr Long   -Repeat CT in 6 months outpatient

## 2018-10-12 NOTE — PHYSICAL THERAPY NOTE
PT EVALUATION       10/12/18 1201   Note Type   Note type Eval/Treat   Pain Assessment   Pain Assessment No/denies pain   Home Living   Type of [address] One level  (2STE)   Home Equipment Walker;Cane  (3L02 24/7)   Prior Function   Level of [place] Independent with ADLs and functional mobility   Lives With Alone   ADL Assistance Independent   IADLs Independent   Restrictions/Precautions   Other Precautions O2;Fall Risk   General   Additional Pertinent History Pt admitted with COPD exacerbation, now improving   Cognition   Overall Cognitive Status WFL   RLE Assessment   RLE Assessment (ROM WFL, MMT 4-/5)   LLE Assessment   LLE Assessment (ROM WFL, MMT 4-/5)   Bed Mobility   Supine to Sit 5  Supervision   Transfers   Sit to Stand 4  Minimal assistance   Stand to Sit 4  Minimal assistance   Stand pivot 4  Minimal assistance   Ambulation/Elevation   Gait pattern (unsteady)   Gait Assistance 4  Minimal assist  (reaching for furniture)   Assistive Device (none, 3L02)   Distance 10 feet   Balance   Static Sitting Fair +   Dynamic Sitting Fair   Static Standing Fair   Dynamic Standing Fair -   Endurance Deficit   Endurance Deficit (Sp02 97% with activity)   Activity Tolerance   Activity Tolerance Patient limited by fatigue   Assessment   Prognosis Good   Problem List Decreased strength;Decreased endurance; Impaired balance;Decreased mobility   Assessment Patient seen for Physical Therapy evaluation  Patient admitted with Dyspnea on exertion  Comorbidities affecting patient's physical performance include: htn, PE, emphysema  Personal factors affecting patient at time of initial evaluation include: inability to navigate community distances, inability to navigate level surfaces without external assistance, inability to perform physical activity and inability to perform ADLS  Prior to admission, patient was independent with functional mobility without assistive device and independent with ADLS    Please find objective findings from Physical Therapy assessment regarding body systems outlined above with impairments and limitations including weakness, impaired balance, decreased endurance, decreased activity tolerance, decreased functional mobility tolerance, fall risk and SOB upon exertion  The Barthel Index was used as a functional outcome tool presenting with a score of 55 today indicating marked limitations of functional mobility and ADLS  Patient's clinical presentation is currently unstable/unpredictable as seen in patient's presentation of increased fall risk, new onset of impairment of functional mobility, decreased endurance and new onset of weakness  Pt would benefit from continued Physical Therapy treatment to address deficits as defined above and maximize level of functional mobility  As demonstrated by objective findings, the assigned level of complexity for this evaluation is high  Goals   Treatment Day 1   Plan   Treatment/Interventions ADL retraining;Functional transfer training; Therapeutic exercise; Endurance training;Gait training;Bed mobility; Equipment eval/education;Patient/family training   PT Frequency 5x/wk   Recommendation   Recommendation Home with family support   Equipment Recommended (pt has 02 walker cane)   Barthel Index   Feeding 10   Bathing 0   Grooming Score 5   Dressing Score 5   Bladder Score 10   Bowels Score 10   Toilet Use Score 5   Transfers (Bed/Chair) Score 10   Mobility (Level Surface) Score 0   Stairs Score 0   Barthel Index Score 54   Licensure   [place] License Number  Mely Murdock PT  [id number]       Time In:1151  Time Out:1201  Total Time: 10      S:  "I can walk"  O:  Min assist with walker to ambulate 75 feet with 3l02  Pt set up OOB in chair for lunch after treatment  Educated pt on benefits of sitting upright for lung expansion  A:  Slow brad, SOB with activity however SP02 97% HR 94 bpm after activity  Pt visibly fatigued with activity    P:  Continue [address], [id number]

## 2018-10-12 NOTE — PROGRESS NOTES
[facility] Practice Progress Note - Zenia Becerra 75 y o  male MRN: [id number]    Unit/Bed#: [address] [place] [address] Encounter: [id number]      Assessment/Plan:  * Dyspnea on exertion   Assessment & Plan    - Pt states he has had worsening SOB especially on exertion since 3 days  He is on 3L O2 at home   -ED:Prednisone 60 mg PO, Duo-neb 3 ml x2, 1 L NS  -CXR: Left basilar streaky density possibly subsegmental atelectasis  -CT: No evidence PE, Several small pulmonary nodules   New left upper lobe pulmonary nodule  Follow-up CT scan in 3-6 months is recommended   -Consult Pulmonology: Appreciate recommendations  -Continue on 3L O2  - Duo-Neb q4h scheduled with peak flow, and Duo-Neb q2h PRN  -Solumedrol 40 mg IV BiD   -urine strep and legionella  -continue home inhaler PRN  -prednisone taper on D/c     Weakness generalized   Assessment & Plan    - Pt states to have decreased appetite over a month, feels so weak he can barely get out of bed  - Vitals on arrival: 170/92, pulse 80, temp 98 5, resp 23, 97%  -K+ 3 3, will replete with K-dur 40 mEq PO    -will continue to monitor electrolytes and vitals through hospital stay  - Consult PT/OT     AAT (alpha-1-antitrypsin) deficiency (HCC)   Assessment & Plan    - Continue Zemaira Weekly outpatient     Gastroesophageal reflux disease without esophagitis   Assessment & Plan    - Pt reports Nausea and mild epigastric discomfort  -nausea: Zofran 4 mg IV PRN  -Recently started on Sucralfate 10 ml QiD   Patient taking Prilosec 40 mg qd  -States he is due for upper GI endoscopy in January  - Continue home meds       Lung nodules   Assessment & Plan    -CT: Several small pulmonary nodules are again visualized   Right upper lobe nodule measuring 8 mm is unchanged   4 mm right lower lobe nodule is noted   2 mm left lower lobe nodule is unchanged   Several adjacent   triangular-shaped right lower lobe   Visual nodules are again visualized   Bulla is seen in the right lower lobe  Verta Donte is a new left upper lobe pulmonary nodule measuring 5 mm  -Continue O/p follow up with Dr Long      HTN (hypertension)   Assessment & Plan    -/92  - Continue home med: Amlodipine 10 mg qd  - will monitor BP     BPH (benign prostatic hyperplasia)   Assessment & Plan    - Reports no changes in urination   -Continue home med: Flomax 0 4 mg qd           Subjective:   Patient seen and examined bedside  No acute events reported over night  Patient reports feeling well over night on oxygen and is tolerating solumedrol well  He states he got SOB whenever he got up to use restroom and did no sleep much over night  Patient is hoping to be discharged today  Objective:     Vitals: Blood pressure 148/73, pulse 71, temperature 98 °F (36 7 °C), temperature source Oral, resp  rate 18, height 6' 5" (1 956 m), weight 78 4 kg (172 lb 13 5 oz), SpO2 97 %  ,Body mass index is 20 5 kg/m²    Wt Readings from Last 3 Encounters:   10/11/18 78 4 kg (172 lb 13 5 oz)   10/02/18 82 6 kg (182 lb)   07/06/18 82 6 kg (182 lb)       Intake/Output Summary (Last 24 hours) at 10/12/18 0560  Last data filed at 10/12/18 0441   Gross per 24 hour   Intake                0 ml   Output              500 ml   Net             -500 ml       Physical Exam: Head: Normocephalic, without obvious abnormality, atraumatic  Chest wall: no tenderness  Heart: regular rate and rhythm, S1, S2 normal, no murmur, click, rub or gallop  Abdomen: soft, non-tender; bowel sounds normal; no masses,  no organomegaly and slight epigastric discomfort  Extremities: extremities normal, warm and well-perfused; no cyanosis, clubbing, or edema     Recent Results (from the past 24 hour(s))   CBC and differential    Collection Time: 10/11/18  6:39 PM   Result Value Ref Range    WBC 7 38 4 31 - 10 16 Thousand/uL    RBC 4 34 3 88 - 5 62 Million/uL    Hemoglobin 13 9 12 0 - 17 0 g/dL    Hematocrit 40 5 36 5 - 49 3 %    MCV 93 82 - 98 fL    MCH 32 0 26 8 - 34 3 pg    MCHC 34 3 31 4 - 37 4 g/dL    RDW 13 1 11 6 - 15 1 %    MPV 8 8 (L) 8 9 - 12 7 fL    Platelets 687 400 - 787 Thousands/uL    nRBC 0 /100 WBCs    Neutrophils Relative 66 43 - 75 %    Immat GRANS % 0 0 - 2 %    Lymphocytes Relative 22 14 - 44 %    Monocytes Relative 8 4 - 12 %    Eosinophils Relative 3 0 - 6 %    Basophils Relative 1 0 - 1 %    Neutrophils Absolute 4 87 1 85 - 7 62 Thousands/µL    Immature Grans Absolute 0 03 0 00 - 0 20 Thousand/uL    Lymphocytes Absolute 1 62 0 60 - 4 47 Thousands/µL    Monocytes Absolute 0 58 0 17 - 1 22 Thousand/µL    Eosinophils Absolute 0 21 0 00 - 0 61 Thousand/µL    Basophils Absolute 0 07 0 00 - 0 10 Thousands/µL   Protime-INR    Collection Time: 10/11/18  6:39 PM   Result Value Ref Range    Protime 10 8 9 4 - 11 7 seconds    INR 1 03 0 86 - 1 16   APTT    Collection Time: 10/11/18  6:39 PM   Result Value Ref Range    PTT 27 24 - 33 seconds   Comprehensive metabolic panel    Collection Time: 10/11/18  6:39 PM   Result Value Ref Range    Sodium 141 136 - 145 mmol/L    Potassium 3 3 (L) 3 5 - 5 3 mmol/L    Chloride 102 100 - 108 mmol/L    CO2 26 21 - 32 mmol/L    ANION GAP 13 4 - 13 mmol/L    BUN 18 5 - 25 mg/dL    Creatinine 1 24 0 60 - 1 30 mg/dL    Glucose 128 65 - 140 mg/dL    Calcium 9 6 8 3 - 10 1 mg/dL    AST 19 5 - 45 U/L    ALT 36 12 - 78 U/L    Alkaline Phosphatase 71 46 - 116 U/L    Total Protein 7 5 6 4 - 8 2 g/dL    Albumin 4 2 3 5 - 5 0 g/dL    Total Bilirubin 1 50 (H) 0 20 - 1 00 mg/dL    eGFR 57 ml/min/1 73sq m   Troponin I    Collection Time: 10/11/18  6:39 PM   Result Value Ref Range    Troponin I <0 02 <=0 04 ng/mL   Basic metabolic panel    Collection Time: 10/12/18  4:47 AM   Result Value Ref Range    Sodium 137 136 - 145 mmol/L    Potassium 4 3 3 5 - 5 3 mmol/L    Chloride 104 100 - 108 mmol/L    CO2 23 21 - 32 mmol/L    ANION GAP 10 4 - 13 mmol/L    BUN 16 5 - 25 mg/dL    Creatinine 1 11 0 60 - 1 30 mg/dL    Glucose 160 (H) 65 - 140 mg/dL    Calcium 8 9 8 3 - 10 1 mg/dL eGFR 65 ml/min/1 73sq m   Magnesium    Collection Time: 10/12/18  4:47 AM   Result Value Ref Range    Magnesium 1 9 1 6 - 2 6 mg/dL   Phosphorus    Collection Time: 10/12/18  4:47 AM   Result Value Ref Range    Phosphorus 1 9 (L) 2 3 - 4 1 mg/dL   CBC and differential    Collection Time: 10/12/18  4:47 AM   Result Value Ref Range    WBC 6 38 4 31 - 10 16 Thousand/uL    RBC 4 52 3 88 - 5 62 Million/uL    Hemoglobin 14 1 12 0 - 17 0 g/dL    Hematocrit 42 3 36 5 - 49 3 %    MCV 94 82 - 98 fL    MCH 31 2 26 8 - 34 3 pg    MCHC 33 3 31 4 - 37 4 g/dL    RDW 12 9 11 6 - 15 1 %    MPV 9 0 8 9 - 12 7 fL    Platelets 683 939 - 888 Thousands/uL    nRBC 0 /100 WBCs    Neutrophils Relative 86 (H) 43 - 75 %    Immat GRANS % 1 0 - 2 %    Lymphocytes Relative 11 (L) 14 - 44 %    Monocytes Relative 2 (L) 4 - 12 %    Eosinophils Relative 0 0 - 6 %    Basophils Relative 0 0 - 1 %    Neutrophils Absolute 5 55 1 85 - 7 62 Thousands/µL    Immature Grans Absolute 0 04 0 00 - 0 20 Thousand/uL    Lymphocytes Absolute 0 67 0 60 - 4 47 Thousands/µL    Monocytes Absolute 0 11 (L) 0 17 - 1 22 Thousand/µL    Eosinophils Absolute 0 00 0 00 - 0 61 Thousand/µL    Basophils Absolute 0 01 0 00 - 0 10 Thousands/µL       Current Facility-Administered Medications   Medication Dose Route Frequency Provider Last Rate Last Dose    amLODIPine (NORVASC) tablet 10 mg  10 mg Oral Daily Elnoria Given, MD        enoxaparin (LOVENOX) subcutaneous injection 40 mg  40 mg Subcutaneous Daily Elnoria Given, MD        fluticasone-vilanterol (BREO ELLIPTA) 200-25 MCG/INH inhaler 1 puff  1 puff Inhalation Daily Elnoria Given, MD        ipratropium-albuterol (DUO-NEB) 0 5-2 5 mg/3 mL inhalation solution 3 mL  3 mL Nebulization Q4H Elnoria Given, MD   3 mL at 10/12/18 0410    ipratropium-albuterol (DUO-NEB) 0 5-2 5 mg/3 mL inhalation solution 3 mL  3 mL Nebulization Q2H PRN Elnoria Given, MD        methylPREDNISolone sodium succinate (Solu-MEDROL) injection 40 mg 40 mg Intravenous Q12H [address] Viviana Hill MD   40 mg at 10/12/18 0050    ondansetron (ZOFRAN) injection 4 mg  4 mg Intravenous Q6H PRN Viviana Hill MD        pantoprazole (PROTONIX) EC tablet 40 mg  40 mg Oral Early Morning Viviana Hill MD        [facility]) chewable tablet 80 mg  80 mg Oral 4x Daily PRN Viviana Hill MD   80 mg at 10/12/18 0434    sucralfate (CARAFATE) oral suspension 1,000 mg  1,000 mg Oral 4x Daily (AC & HS) Viviana Hill MD   1,000 mg at 10/12/18 0050    tamsulosin (FLOMAX) capsule 0 4 mg  0 4 mg Oral Daily Viviana Hill MD           Invasive Devices     Peripheral Intravenous Line            Peripheral IV 10/11/18 Right Antecubital less than 1 day                Lab, Imaging and other studies: I have personally reviewed pertinent reports      VTE Pharmacologic Prophylaxis: Enoxaparin (Lovenox)  VTE Mechanical Prophylaxis: sequential compression device    Viviana Hill MD

## 2018-10-12 NOTE — ED NOTES
Pt ambulated in hallway with supervision, maintained oxygen while ambulating, oxygen saturation levels at 94-96%, pt more short of breath with exertion, felt shaky while ambulating      Erica Gracia RN  10/11/18 [id number] Matt [place], RN  10/11/18 1689

## 2018-10-13 PROBLEM — J96.11 CHRONIC RESPIRATORY FAILURE WITH HYPOXIA, ON HOME O2 THERAPY (HCC): Status: ACTIVE | Noted: 2017-12-30

## 2018-10-13 PROBLEM — Z99.81 CHRONIC RESPIRATORY FAILURE WITH HYPOXIA, ON HOME O2 THERAPY (HCC): Status: ACTIVE | Noted: 2017-12-30

## 2018-10-13 LAB
ANION GAP SERPL CALCULATED.3IONS-SCNC: 12 MMOL/L (ref 4–13)
BASOPHILS # BLD AUTO: 0.02 THOUSANDS/ΜL (ref 0–0.1)
BASOPHILS NFR BLD AUTO: 0 % (ref 0–1)
BUN SERPL-MCNC: 24 MG/DL (ref 5–25)
CALCIUM SERPL-MCNC: 9.3 MG/DL (ref 8.3–10.1)
CHLORIDE SERPL-SCNC: 103 MMOL/L (ref 100–108)
CO2 SERPL-SCNC: 22 MMOL/L (ref 21–32)
CREAT SERPL-MCNC: 1.25 MG/DL (ref 0.6–1.3)
EOSINOPHIL # BLD AUTO: 0 THOUSAND/ΜL (ref 0–0.61)
EOSINOPHIL NFR BLD AUTO: 0 % (ref 0–6)
ERYTHROCYTE [DISTWIDTH] IN BLOOD BY AUTOMATED COUNT: 13.1 % (ref 11.6–15.1)
GFR SERPL CREATININE-BSD FRML MDRD: 56 ML/MIN/1.73SQ M
GLUCOSE SERPL-MCNC: 141 MG/DL (ref 65–140)
HCT VFR BLD AUTO: 42.3 % (ref 36.5–49.3)
HGB BLD-MCNC: 14.4 G/DL (ref 12–17)
IMM GRANULOCYTES # BLD AUTO: 0.07 THOUSAND/UL (ref 0–0.2)
IMM GRANULOCYTES NFR BLD AUTO: 1 % (ref 0–2)
LYMPHOCYTES # BLD AUTO: 0.76 THOUSANDS/ΜL (ref 0.6–4.47)
LYMPHOCYTES NFR BLD AUTO: 7 % (ref 14–44)
MAGNESIUM SERPL-MCNC: 2 MG/DL (ref 1.6–2.6)
MCH RBC QN AUTO: 32.3 PG (ref 26.8–34.3)
MCHC RBC AUTO-ENTMCNC: 34 G/DL (ref 31.4–37.4)
MCV RBC AUTO: 95 FL (ref 82–98)
MONOCYTES # BLD AUTO: 0.21 THOUSAND/ΜL (ref 0.17–1.22)
MONOCYTES NFR BLD AUTO: 2 % (ref 4–12)
NEUTROPHILS # BLD AUTO: 10.14 THOUSANDS/ΜL (ref 1.85–7.62)
NEUTS SEG NFR BLD AUTO: 90 % (ref 43–75)
NRBC BLD AUTO-RTO: 0 /100 WBCS
PHOSPHATE SERPL-MCNC: 3.4 MG/DL (ref 2.3–4.1)
PLATELET # BLD AUTO: 244 THOUSANDS/UL (ref 149–390)
PMV BLD AUTO: 9 FL (ref 8.9–12.7)
POTASSIUM SERPL-SCNC: 4.2 MMOL/L (ref 3.5–5.3)
RBC # BLD AUTO: 4.46 MILLION/UL (ref 3.88–5.62)
SODIUM SERPL-SCNC: 137 MMOL/L (ref 136–145)
WBC # BLD AUTO: 11.2 THOUSAND/UL (ref 4.31–10.16)

## 2018-10-13 PROCEDURE — 94760 N-INVAS EAR/PLS OXIMETRY 1: CPT

## 2018-10-13 PROCEDURE — 83735 ASSAY OF MAGNESIUM: CPT | Performed by: STUDENT IN AN ORGANIZED HEALTH CARE EDUCATION/TRAINING PROGRAM

## 2018-10-13 PROCEDURE — 80048 BASIC METABOLIC PNL TOTAL CA: CPT | Performed by: STUDENT IN AN ORGANIZED HEALTH CARE EDUCATION/TRAINING PROGRAM

## 2018-10-13 PROCEDURE — 85025 COMPLETE CBC W/AUTO DIFF WBC: CPT | Performed by: STUDENT IN AN ORGANIZED HEALTH CARE EDUCATION/TRAINING PROGRAM

## 2018-10-13 PROCEDURE — 94664 DEMO&/EVAL PT USE INHALER: CPT

## 2018-10-13 PROCEDURE — 84100 ASSAY OF PHOSPHORUS: CPT | Performed by: STUDENT IN AN ORGANIZED HEALTH CARE EDUCATION/TRAINING PROGRAM

## 2018-10-13 PROCEDURE — 94640 AIRWAY INHALATION TREATMENT: CPT

## 2018-10-13 PROCEDURE — 99222 1ST HOSP IP/OBS MODERATE 55: CPT | Performed by: INTERNAL MEDICINE

## 2018-10-13 RX ORDER — PANTOPRAZOLE SODIUM 40 MG/1
40 TABLET, DELAYED RELEASE ORAL 2 TIMES DAILY
Status: DISCONTINUED | OUTPATIENT
Start: 2018-10-13 | End: 2018-10-17 | Stop reason: HOSPADM

## 2018-10-13 RX ADMIN — IPRATROPIUM BROMIDE AND ALBUTEROL SULFATE 3 ML: .5; 3 SOLUTION RESPIRATORY (INHALATION) at 23:31

## 2018-10-13 RX ADMIN — FLUTICASONE FUROATE AND VILANTEROL TRIFENATATE 1 PUFF: 200; 25 POWDER RESPIRATORY (INHALATION) at 09:28

## 2018-10-13 RX ADMIN — ENOXAPARIN SODIUM 40 MG: 40 INJECTION SUBCUTANEOUS at 09:28

## 2018-10-13 RX ADMIN — IPRATROPIUM BROMIDE AND ALBUTEROL SULFATE 3 ML: .5; 3 SOLUTION RESPIRATORY (INHALATION) at 07:55

## 2018-10-13 RX ADMIN — SUCRALFATE 1000 MG: 1 SUSPENSION ORAL at 12:32

## 2018-10-13 RX ADMIN — IPRATROPIUM BROMIDE AND ALBUTEROL SULFATE 3 ML: .5; 3 SOLUTION RESPIRATORY (INHALATION) at 11:40

## 2018-10-13 RX ADMIN — METHYLPREDNISOLONE SODIUM SUCCINATE 40 MG: 40 INJECTION, POWDER, FOR SOLUTION INTRAMUSCULAR; INTRAVENOUS at 22:49

## 2018-10-13 RX ADMIN — IPRATROPIUM BROMIDE AND ALBUTEROL SULFATE 3 ML: .5; 3 SOLUTION RESPIRATORY (INHALATION) at 00:13

## 2018-10-13 RX ADMIN — IPRATROPIUM BROMIDE AND ALBUTEROL SULFATE 3 ML: .5; 3 SOLUTION RESPIRATORY (INHALATION) at 03:55

## 2018-10-13 RX ADMIN — METHYLPREDNISOLONE SODIUM SUCCINATE 40 MG: 40 INJECTION, POWDER, FOR SOLUTION INTRAMUSCULAR; INTRAVENOUS at 14:41

## 2018-10-13 RX ADMIN — TAMSULOSIN HYDROCHLORIDE 0.4 MG: 0.4 CAPSULE ORAL at 09:29

## 2018-10-13 RX ADMIN — ZOLPIDEM TARTRATE 10 MG: 5 TABLET, FILM COATED ORAL at 22:48

## 2018-10-13 RX ADMIN — METHYLPREDNISOLONE SODIUM SUCCINATE 40 MG: 40 INJECTION, POWDER, FOR SOLUTION INTRAMUSCULAR; INTRAVENOUS at 06:07

## 2018-10-13 RX ADMIN — SUCRALFATE 1000 MG: 1 SUSPENSION ORAL at 06:07

## 2018-10-13 RX ADMIN — IPRATROPIUM BROMIDE AND ALBUTEROL SULFATE 3 ML: .5; 3 SOLUTION RESPIRATORY (INHALATION) at 20:45

## 2018-10-13 RX ADMIN — AMLODIPINE BESYLATE 10 MG: 10 TABLET ORAL at 09:28

## 2018-10-13 RX ADMIN — PANTOPRAZOLE SODIUM 40 MG: 40 TABLET, DELAYED RELEASE ORAL at 06:07

## 2018-10-13 RX ADMIN — PANTOPRAZOLE SODIUM 40 MG: 40 TABLET, DELAYED RELEASE ORAL at 17:11

## 2018-10-13 RX ADMIN — IPRATROPIUM BROMIDE AND ALBUTEROL SULFATE 3 ML: .5; 3 SOLUTION RESPIRATORY (INHALATION) at 15:16

## 2018-10-13 RX ADMIN — SUCRALFATE 1000 MG: 1 SUSPENSION ORAL at 22:48

## 2018-10-13 RX ADMIN — SUCRALFATE 1000 MG: 1 SUSPENSION ORAL at 17:11

## 2018-10-13 NOTE — ED PROVIDER NOTES
History  Chief Complaint   Patient presents with    Shortness of Breath     shortness of breath worsening today, on 3 liters oxygen per NC continuously     Patient presents for evaluation of dyspnea  Worsening over the last couple days  On 3 L NC at home  No fever or productive cough  Denies chest pain  History provided by:  Patient   used: No    Shortness of Breath       Prior to Admission Medications   Prescriptions Last Dose Informant Patient Reported? Taking?    Alpha1-Proteinase Inhibitor (ZEMAIRA IV)  Self Yes No   Sig: Infuse into a venous catheter once a week On tuesday -1000mg /50 ml    INCRUSE ELLIPTA 62 5 MCG/INH AEPB inhaler   No No   Sig: INHALE ONE PUFF BY MOUTH ONE TIME DAILY    ZEMAIRA injection  Self Yes No   albuterol (2 5 mg/3 mL) 0 083 % nebulizer solution Past Week at Unknown time Self Yes Yes   Sig: Inhale 1 each every 4 (four) hours as needed   albuterol (PROVENTIL HFA,VENTOLIN HFA) 90 mcg/act inhaler  Self No No   Sig: Inhale 2 puffs every 6 (six) hours as needed for wheezing   amLODIPine (NORVASC) 10 mg tablet   No No   Sig: TAKE ONE TABLET BY MOUTH ONE TIME DAILY    amLODIPine (NORVASC) 5 mg tablet Not Taking at Unknown time Self No No   Sig: Take 1 tablet (5 mg total) by mouth daily   Patient not taking: Reported on 10/2/2018    apixaban (ELIQUIS) 5 mg Not Taking at Unknown time Self No No   Sig: Take 1 tablet (5 mg total) by mouth 2 (two) times a day   Patient not taking: Reported on 10/2/2018    finasteride (PROSCAR) 5 mg tablet  Self Yes No   Sig: Take 5 mg by mouth daily     fluticasone (FLONASE) 50 mcg/act nasal spray   No No   Si sprays into each nostril daily   fluticasone-salmeterol (ADVAIR DISKUS) 250-50 mcg/dose inhaler  Self Yes No   Sig: Inhale 1 puff 2 (two) times a day     meclizine (ANTIVERT) 12 5 MG tablet Not Taking at Unknown time Self Yes No   Sig: Take 1 tablet by mouth daily   omeprazole (PriLOSEC) 40 MG capsule   No No   Sig: TAKE ONE CAPSULE BY MOUTH TWICE DAILY    sucralfate (CARAFATE) 1 g/10 mL suspension   No No   Sig: Take 10 mL (1 g total) by mouth 4 (four) times a day   tamsulosin (FLOMAX) 0 4 mg   No No   Sig: TAKE ONE CAPSULE BY MOUTH ONE TIME DAILY    theophylline (ESTEFANIA-24) 400 MG 24 hr capsule  Self No No   Sig: Take 1 capsule (400 mg total) by mouth daily   zolpidem (AMBIEN CR) 6 25 MG CR tablet Not Taking at Unknown time Self Yes No   Sig: Take 10 mg by mouth daily at bedtime as needed for sleep   zolpidem (AMBIEN) 10 mg tablet   No No   Sig: TAKE ONE TABLET BY MOUTH AT BEDTIME       Facility-Administered Medications: None       Past Medical History:   Diagnosis Date    Anesthesia complication     Difficult to wake up    Arthritis     BPH (benign prostatic hyperplasia)     urinary frequency    Cancer (HCC)     basal cell neck, face    COPD (chronic obstructive pulmonary disease) (HCC)     Full dentures     Hypertension     controlled    Kidney stone     at least 7 episodes    Liver disease     Alpha 1- enzyme deficiency - diagnosed 2002  has been on weekly replacement therapy since then    Pulmonary emphysema ([facility] [address] )     1/25/15  FEV1 - 2 45 liters or 59% of predicted    Wears glasses     for driving only       Past Surgical History:   Procedure Laterality Date    BACK SURGERY  2008    discectomy    COLONOSCOPY      COLONOSCOPY N/A 3/10/2017    Procedure: Que White;  Surgeon: Yoel Laguna MD;  Location: [facility] GI LAB; Service:    Nory Presume      removal of kidney stones    ESOPHAGOGASTRODUODENOSCOPY N/A 3/10/2017    Procedure: ESOPHAGOGASTRODUODENOSCOPY (EGD); Surgeon: Yoel Laguna MD;  Location: [facility] GI LAB;   Service:     LITHOTRIPSY      TONSILLECTOMY      VEIN LIGATION AND STRIPPING Bilateral     17's       Family History   Problem Relation Age of Onset    Emphysema Mother         never smoked    Emphysema Father     Cancer Brother         colon    Colon cancer Brother     Ulcerative colitis Family     Liver disease Family      I have reviewed and agree with the history as documented  Social History   Substance Use Topics    Smoking status: Former Smoker     Packs/day: 1 00     Years: 60 00     Quit date: 8/31/2017    Smokeless tobacco: Never Used      Comment: quit in august 2017    Alcohol use No        Review of Systems   Respiratory: Positive for shortness of breath  All other systems reviewed and are negative  Physical Exam  Physical Exam   Constitutional: He is oriented to person, place, and time  No distress  HENT:   Mouth/Throat: Oropharynx is clear and moist    Eyes: Pupils are equal, round, and reactive to light  Neck: Normal range of motion  Cardiovascular: Normal rate, regular rhythm and intact distal pulses  Pulmonary/Chest: He is in respiratory distress  He has wheezes  Mild respiratory distress   Abdominal: Soft  Bowel sounds are normal  He exhibits distension  There is no tenderness  Mild distention   Musculoskeletal: Normal range of motion  Neurological: He is alert and oriented to person, place, and time  Skin: Capillary refill takes less than 2 seconds  He is not diaphoretic  Nursing note and vitals reviewed        Vital Signs  ED Triage Vitals [10/11/18 1802]   Temperature Pulse Respirations Blood Pressure SpO2   97 5 °F (36 4 °C) 99 22 (!) 179/84 97 %      Temp Source Heart Rate Source Patient Position - Orthostatic VS BP Location FiO2 (%)   Oral Monitor Lying Left arm --      Pain Score       No Pain           Vitals:    10/12/18 0441 10/12/18 0734 10/12/18 1343 10/12/18 2138   BP: 148/73 145/76 141/79 157/76   Pulse: 71 79 76 84   Patient Position - Orthostatic VS: Lying Lying Lying Sitting       Visual Acuity      ED Medications  Medications   fluticasone-vilanterol (BREO ELLIPTA) 200-25 MCG/INH inhaler 1 puff (1 puff Inhalation Given 10/12/18 0942)   pantoprazole (PROTONIX) EC tablet 40 mg (40 mg Oral Given 10/12/18 0634)   sucralfate (CARAFATE) oral suspension 1,000 mg (1,000 mg Oral Given 10/12/18 2250)   tamsulosin (FLOMAX) capsule 0 4 mg (0 4 mg Oral Given 10/12/18 0943)   amLODIPine (NORVASC) tablet 10 mg (10 mg Oral Given 10/12/18 0943)   enoxaparin (LOVENOX) subcutaneous injection 40 mg (40 mg Subcutaneous Given 10/12/18 0943)   simethicone (MYLICON) chewable tablet 80 mg (80 mg Oral Given 10/12/18 2028)   ondansetron (ZOFRAN) injection 4 mg (not administered)   ipratropium-albuterol (DUO-NEB) 0 5-2 5 mg/3 mL inhalation solution 3 mL (3 mL Nebulization Given 10/12/18 2001)   ipratropium-albuterol (DUO-NEB) 0 5-2 5 mg/3 mL inhalation solution 3 mL (not administered)   methylPREDNISolone sodium succinate (Solu-MEDROL) injection 40 mg (40 mg Intravenous Given 10/12/18 2250)   zolpidem (AMBIEN) tablet 10 mg (not administered)   predniSONE tablet 60 mg (60 mg Oral Given 10/11/18 1850)   ipratropium-albuterol (DUO-NEB) 0 5-2 5 mg/3 mL inhalation solution 3 mL (3 mL Nebulization Given 10/11/18 1851)   ipratropium-albuterol (DUO-NEB) 0 5-2 5 mg/3 mL inhalation solution 3 mL (3 mL Nebulization Given 10/11/18 1838)   sodium chloride 0 9 % bolus 1,000 mL (1,000 mL Intravenous New Bag 10/11/18 1952)   iohexol (OMNIPAQUE) 350 MG/ML injection (MULTI-DOSE) 100 mL (100 mL Intravenous Given 10/11/18 2038)   sodium chloride 0 9 % bolus 500 mL (500 mL Intravenous New Bag 10/11/18 2252)   potassium chloride (K-DUR,KLOR-CON) CR tablet 40 mEq (40 mEq Oral Given 10/12/18 0050)       Diagnostic Studies  Results Reviewed     Procedure Component Value Units Date/Time    Troponin I [[id number]]  (Normal) Collected:  10/11/18 1839    Lab Status:  Final result Specimen:  Blood from Arm, Right Updated:  10/11/18 1907     Troponin I <0 02 ng/mL     Comprehensive metabolic panel [[id number]]  (Abnormal) Collected:  10/11/18 1839    Lab Status:  Final result Specimen:  Blood from Arm, Right Updated:  10/11/18 1901     Sodium 141 mmol/L      Potassium 3 3 (L) mmol/L Chloride 102 mmol/L      CO2 26 mmol/L      ANION GAP 13 mmol/L      BUN 18 mg/dL      Creatinine 1 24 mg/dL      Glucose 128 mg/dL      Calcium 9 6 mg/dL      AST 19 U/L      ALT 36 U/L      Alkaline Phosphatase 71 U/L      Total Protein 7 5 g/dL      Albumin 4 2 g/dL      Total Bilirubin 1 50 (H) mg/dL      eGFR 57 ml/min/1 73sq m     Narrative:         National Kidney Disease Education Program recommendations are as follows:  GFR calculation is accurate only with a steady state creatinine  Chronic Kidney disease less than 60 ml/min/1 73 sq  meters  Kidney failure less than 15 ml/min/1 73 sq  meters  Protime-INR [[id number]]  (Normal) Collected:  10/11/18 1839    Lab Status:  Final result Specimen:  Blood from Arm, Right Updated:  10/11/18 1900     Protime 10 8 seconds      INR 1 03    APTT [[id number]]  (Normal) Collected:  10/11/18 1839    Lab Status:  Final result Specimen:  Blood from Arm, Right Updated:  10/11/18 1900     PTT 27 seconds     CBC and differential [[id number]]  (Abnormal) Collected:  10/11/18 1839    Lab Status:  Final result Specimen:  Blood from Arm, Right Updated:  10/11/18 1844     WBC 7 38 Thousand/uL      RBC 4 34 Million/uL      Hemoglobin 13 9 g/dL      Hematocrit 40 5 %      MCV 93 fL      MCH 32 0 pg      MCHC 34 3 g/dL      RDW 13 1 %      MPV 8 8 (L) fL      Platelets 818 Thousands/uL      nRBC 0 /100 WBCs      Neutrophils Relative 66 %      Immat GRANS % 0 %      Lymphocytes Relative 22 %      Monocytes Relative 8 %      Eosinophils Relative 3 %      Basophils Relative 1 %      Neutrophils Absolute 4 87 Thousands/µL      Immature Grans Absolute 0 03 Thousand/uL      Lymphocytes Absolute 1 62 Thousands/µL      Monocytes Absolute 0 58 Thousand/µL      Eosinophils Absolute 0 21 Thousand/µL      Basophils Absolute 0 07 Thousands/µL                  XR chest 1 view portable   Final Result by Timothy Duncan MD (10/11 2151)      Left basilar streaky density possibly subsegmental atelectasis  Workstation performed: [id number]         CTA chest ct abdomen pelvis w contrast   Final Result by Stephanie Jimenez DO (10/11 2053)      No evidence of pulmonary embolus      Several small pulmonary nodules  New left upper lobe pulmonary nodule  Follow-up CT scan in 3-6 months is recommended  The study was marked in EPIC for significant notification  Workstation performed: [id number]                    Procedures  Procedures       Phone Contacts  ED Phone Contact    ED Course  ED Course as of Oct 12 2320   Thu Oct 11, 2018   1948 Patient still SOB, CXR no acute disease, History of PE taken off Eliquis in August  Will get CTA to evaluate for PE                                  MDM  Number of Diagnoses or Management Options  COPD exacerbation [facility]):   Diagnosis management comments: Pulse ox 94% on 3 L NNC indicating adequate oxygenation  CXR: NAD as read by me    Patient signed out to next provider pending CTA results          Amount and/or Complexity of Data Reviewed  Clinical lab tests: ordered and reviewed  Tests in the radiology section of CPT®: ordered  Decide to obtain previous medical records or to obtain history from someone other than the patient: yes  Review and summarize past medical records: yes  Discuss the patient with other providers: yes  Independent visualization of images, tracings, or specimens: yes    Patient Progress  Patient progress: stable    CritCare Time    Disposition  Final diagnoses:   COPD exacerbation ([place] [address] )     Time reflects when diagnosis was documented in both MDM as applicable and the Disposition within this note     Time User Action Codes Description Comment    10/11/2018 10:06 PM Lisseth Leahy Add [J44 1] COPD exacerbation ([facility] )     10/11/2018 11:50 PM Maci Milnery Add [E88 01] AAT (alpha-1-antitrypsin) deficiency ([facility] )     10/11/2018 11:50 PM Macineha Milnery Modify [E88 01] AAT (alpha-1-antitrypsin) deficiency ([facility] )     10/11/2018 11:50 PM Maci Hdz Add [R91 8] Lung nodules     10/11/2018 11:50 PM Maci Downy Modify [R91 8] Lung nodules     10/11/2018 11:50 PM Maci Downy Add [R06 09] Dyspnea on exertion     10/11/2018 11:50 PM Maci Downy Modify [R06 09] Dyspnea on exertion       ED Disposition     ED Disposition Condition Comment    Admit  Case was discussed with Dr Moncho Velasquez  and the patient's admission status was agreed to be Admission Status: inpatient status to the service of Dr Eder Hammond   Follow-up Information    None         Current Discharge Medication List      CONTINUE these medications which have NOT CHANGED    Details   albuterol (2 5 mg/3 mL) 0 083 % nebulizer solution Inhale 1 each every 4 (four) hours as needed      albuterol (PROVENTIL HFA,VENTOLIN HFA) 90 mcg/act inhaler Inhale 2 puffs every 6 (six) hours as needed for wheezing  Qty: 1 Inhaler, Refills: 0    Comments: Please substitute based on patient insurance  Associated Diagnoses: Chronic obstructive pulmonary disease, unspecified COPD type ([facility] )      ! !  Alpha1-Proteinase Inhibitor (ZEMAIRA IV) Infuse into a venous catheter once a week On tuesday -1000mg /50 ml       !! amLODIPine (NORVASC) 10 mg tablet TAKE ONE TABLET BY MOUTH ONE TIME DAILY   Qty: 60 tablet, Refills: 5    Associated Diagnoses: Essential hypertension      !! amLODIPine (NORVASC) 5 mg tablet Take 1 tablet (5 mg total) by mouth daily  Qty: 30 tablet, Refills: 3    Associated Diagnoses: Essential hypertension      apixaban (ELIQUIS) 5 mg Take 1 tablet (5 mg total) by mouth 2 (two) times a day  Qty: 60 tablet, Refills: 0    Associated Diagnoses: Pulmonary embolism (HCC)      finasteride (PROSCAR) 5 mg tablet Take 5 mg by mouth daily        fluticasone (FLONASE) 50 mcg/act nasal spray 2 sprays into each nostril daily  Qty: 16 g, Refills: 5    Associated Diagnoses: Rhinitis, unspecified type      fluticasone-salmeterol (ADVAIR DISKUS) 250-50 mcg/dose inhaler Inhale 1 puff 2 (two) times a day        INCRUSE ELLIPTA 62 5 MCG/INH AEPB inhaler INHALE ONE PUFF BY MOUTH ONE TIME DAILY   Qty: 1 Inhaler, Refills: 4    Associated Diagnoses: Centrilobular emphysema (HCC)      meclizine (ANTIVERT) 12 5 MG tablet Take 1 tablet by mouth daily      omeprazole (PriLOSEC) 40 MG capsule TAKE ONE CAPSULE BY MOUTH TWICE DAILY   Qty: 60 capsule, Refills: 5    Associated Diagnoses: Gastroesophageal reflux disease, esophagitis presence not specified      sucralfate (CARAFATE) 1 g/10 mL suspension Take 10 mL (1 g total) by mouth 4 (four) times a day  Qty: 420 mL, Refills: 5    Associated Diagnoses: Gastroesophageal reflux disease, esophagitis presence not specified      tamsulosin (FLOMAX) 0 4 mg TAKE ONE CAPSULE BY MOUTH ONE TIME DAILY   Qty: 30 capsule, Refills: 5    Associated Diagnoses: Benign prostatic hyperplasia with urinary frequency      theophylline (ESTEFANIA-24) 400 MG 24 hr capsule Take 1 capsule (400 mg total) by mouth daily  Qty: 90 capsule, Refills: 3    Associated Diagnoses: Acute hypoxemic respiratory failure (HCC)      ! ! ZEMAIRA injection       zolpidem (AMBIEN CR) 6 25 MG CR tablet Take 10 mg by mouth daily at bedtime as needed for sleep      zolpidem (AMBIEN) 10 mg tablet TAKE ONE TABLET BY MOUTH AT BEDTIME   Qty: 30 tablet, Refills: 5    Associated Diagnoses: Primary insomnia       !! - Potential duplicate medications found  Please discuss with provider  No discharge procedures on file      ED Provider  Electronically Signed by           Gaurav Muhammad DO  10/12/18 7291

## 2018-10-13 NOTE — PROGRESS NOTES
Progress Note - Pulmonary   Carleen Esquivel 75 y o  male MRN: [id number]  Unit/Bed#: [facility] Encounter: [id number]      Assessment/Plan:     1  COPD Exacerbation / Alpha 1 antitrypsin deficiency (PiSZ) on replacement therapy:  Currently pt feels that he is improving however still gets short of breath with minimal exertion  Feels first day that his breathing has slightly improved  Continue with current bronchodilators / steroid therapy  If stays improved tomorrow than would taper steroids to q 12     2  Lung Nodules: For repeat ct chest as outpt in 6 months        ______________________________________________________________________    Subjective: Pt seen and examined at bedside  Feels that his breathing has improved however still gets short of breath with minimal exertion  Tele Events:     Vitals:   Temp:  [97 7 °F (36 5 °C)-98 7 °F (37 1 °C)] 98 °F (36 7 °C)  HR:  [74-84] 74  Resp:  [18-22] 18  BP: (141-157)/(76-79) 154/77  Weight (last 2 days)     Date/Time   Weight    10/11/18 2256  78 4 (172 84)    10/11/18 1802  81 6 (180)            Oxygen Therapy  SpO2: 97 %  O2 Flow Rate (L/min): 3 L/min    IV Infusions:       Nutrition:        Diet Orders            Start     Ordered    10/12/18 0838  Room Service  Once     Question:  Type of Service  Answer:  Room Service-Appropriate    10/12/18 0293    10/11/18 2357  Diet Regular; Regular House  Diet effective now     Question Answer Comment   Diet Type Regular    Regular Regular House    RD to adjust diet per protocol?  Yes        10/11/18 2356          Ins/Outs:   I/O       10/11 0701 - 10/12 0700 10/12 0701 - 10/13 0700 10/13 0701 - 10/14 0700    Urine (mL/kg/hr) 500 200 (0 1)     Total Output 500 200      Net -500 -200                   Lines/Drains:  Invasive Devices     Peripheral Intravenous Line            Peripheral IV 10/11/18 Right Antecubital 1 day                 Active medications:  Scheduled Meds:  Current Facility-Administered Medications:  amLODIPine 10 mg Oral Daily Viviana Hill MD   enoxaparin 40 mg Subcutaneous Daily Viviana Hill MD   fluticasone-vilanterol 1 puff Inhalation Daily Viviana Hill MD   ipratropium-albuterol 3 mL Nebulization Q4H Viviana Hill MD   ipratropium-albuterol 3 mL Nebulization Q2H PRN Viviana Hill MD   methylPREDNISolone sodium succinate 40 mg Intravenous [facility] Javon Mixon MD   ondansetron 4 mg Intravenous Q6H PRN Viviana Hill MD   pantoprazole 40 mg Oral Early Morning Viviana Hill MD   simethicone 80 mg Oral 4x Daily PRN Viviana Hill MD   sucralfate 1,000 mg Oral 4x Daily (AC & HS) Viviana Hill MD   tamsulosin 0 4 mg Oral Daily Viviana Hill MD   zolpidem 10 mg Oral HS PRN Viviana Hill MD     PRN Meds:  ipratropium-albuterol 3 mL Q2H PRN   ondansetron 4 mg Q6H PRN   simethicone 80 mg 4x Daily PRN   zolpidem 10 mg HS PRN     ____________________________________________________________________      Physical Exam   Constitutional: He is oriented to person, place, and time  He appears well-developed and well-nourished  HENT:   Head: Normocephalic and atraumatic  Eyes: Pupils are equal, round, and reactive to light  Conjunctivae are normal    Neck: Normal range of motion  Neck supple  No JVD present  Cardiovascular: Normal rate  Pulmonary/Chest: Effort normal  He has no wheezes  Decreased airentry b/l   Abdominal: Soft  Bowel sounds are normal    Musculoskeletal: Normal range of motion  Neurological: He is alert and oriented to person, place, and time  Psychiatric: He has a normal mood and affect  His behavior is normal    Nursing note and vitals reviewed            ____________________________________________________________________    Labs:   CBC:   Results from last 7 days  Lab Units 10/13/18  0456 10/12/18  0447 10/11/18  1839   WBC Thousand/uL 11 20* 6 38 7 38   HEMOGLOBIN g/dL 14 4 14 1 13 9   HEMATOCRIT % 42 3 42 3 40 5   MCV fL 95 94 93   PLATELETS Thousands/uL 244 188 199     CMP:   Results from last 7 days  Lab Units 10/13/18  0456 10/12/18  0447 10/11/18  1839   SODIUM mmol/L 137 137 141   POTASSIUM mmol/L 4 2 4 3 3 3*   CHLORIDE mmol/L 103 104 102   CO2 mmol/L 22 23 26   BUN mg/dL 24 16 18   CREATININE mg/dL 1 25 1 11 1 24   CALCIUM mg/dL 9 3 8 9 9 6   AST U/L  --   --  19   ALT U/L  --   --  36   ALK PHOS U/L  --   --  71   EGFR ml/min/1 73sq m 56 65 57     Magnesium:   Results from last 7 days  Lab Units 10/13/18  0456   MAGNESIUM mg/dL 2 0     Phosphorous:   Results from last 7 days  Lab Units 10/13/18  0456   PHOSPHORUS mg/dL 3 4     Troponin:   Results from last 7 days  Lab Units 10/11/18  1839   TROPONIN I ng/mL <0 02     PT/INR:   Results from last 7 days  Lab Units 10/11/18  1839   PTT seconds 27   INR  1 03     Lactic Acid:     BNP:     TSH:     Procalcitonin: Invalid input(s): PROCALCITONIN      Imaging: Pts radiographs reviewed by me        Micro: Lab Results   Component Value Date    BLOODCX No Growth After 5 Days  12/29/2017    BLOODCX No Growth After 5 Days  12/29/2017    MRSACULTURE Culture results to follow   10/11/2018            Invalid input(s): LEGIONELLAURINARYANTIGEN        Code Status: Level 1 - Full Code

## 2018-10-13 NOTE — PLAN OF CARE
DISCHARGE PLANNING - CARE MANAGEMENT     Discharge to post-acute care or home with appropriate resources Progressing        HEMATOLOGIC - ADULT     Maintains hematologic stability Progressing        METABOLIC, FLUID AND ELECTROLYTES - ADULT     Electrolytes maintained within normal limits Progressing     Fluid balance maintained Progressing        Potential for Falls     Patient will remain free of falls Progressing        RESPIRATORY - ADULT     Achieves optimal ventilation and oxygenation Progressing        SKIN/TISSUE INTEGRITY - ADULT     Skin integrity remains intact Progressing

## 2018-10-13 NOTE — CONSULTS
Consultation - Pulmonary Medicine   Mila Baumgarten 75 y o  male MRN: [id number]  Unit/Bed#: 2 [place] [address] Encounter: [id number]      Assessment:  Dyspnea possibly secondary to acute exacerbation of COPD  He has severe centrilobular emphysema with FEV1 of 1 6 L or 40% of predicted  Alpha 1 antitrypsin deficiency PiSLU diagnosed in 2002 and he is on weekly IV antitrypsin replacement therapy  Small lung nodules likely benign with no significant change from prior CT scan other than new small 5 mm left upper lobe lung nodule  History of pulmonary embolism of left lower lobe pulmonary artery  This was diagnosed February 2018 and resolved  He had 6 months of anticoagulation with Eliquis    Plan:   Concur with IV methylprednisone 40 mg every 8 hr  Continues nebulized treatments with DuoNeb  Oxygen 3 liters/minute  Regarding lung nodules all arrange for follow-up CT of chest likely in 6 months        History of Present Illness   Physician Requesting Consult: Ayse Bright MD  Reason for Consult / Principal Problem: dyspnea  Hx and PE limited by: none    HPI: Mila Baumgarten is a 75y o  year old male who presents with complaints of worsening shortness of breath over the last several days as well as generalized weakness     Even using his oxygen 3 liters/minute he feels short of breath walking 20-30 feet  No new cough, fever, or chills  Peg Mcbride has history of alpha-1 antitrypsin deficiency diagnosed in 2002 and receives weekly alpha-1 antitrypsin replacement therapy intravenous  He has severe centrilobular emphysema with FEV1 of 1 6 L or 40% of predicted  He had been using Advair 250 mcg 1 puff twice a day and Incruse 1 puff daily  Also has a history of very small lung nodules which have not changed over subsequent CT scans  He does have history of pulmonary embolism involving primarily the left lower lobe pulmonary artery and this was in mid February 2018    Venous Doppler studies at that time were negative for acute JAYLA T  Trigger for this DVT use felt due to patient's inactivity due to a persistent COPD exacerbation from at RSV infection in January  He was on Eliquis for 6 months and this this was discontinued  He is on oxygen 3 liters/minute continuous at home  He has phenotype PiSZ and has been on weekly IV antitrypsin replacement therapy (Raford Wilber) since 2002  He also has history of hypertension and GERD    CT a of the chest done yesterday was reviewed and shows evidence of centrilobular emphysema and very small nodules bilaterally which ranged from 2 mm to 8 mm  Acute change compared to prior CT scans  No evidence of any pulmonary embolism or any new lung infiltrates  There is a small new 5 mm left upper lobe infiltrate  He did have an echocardiogram done in June 2018 which showed normal left ventricular systolic function and pulmonary pressure of 35 mm Hg  Blood work showed serum hemoglobin to be normal at 14 1 and white blood cell count normal at 6 38    Patient has been started on IV methylprednisolone 40 mg every 8 hr and is receiving neb treatments with DuoNeb  He does take Advair 250 mcg 1 puff twice a day and Incruse 1 puff daily at home    Review of Systems   Constitutional: Negative for chills, fever and unexpected weight change  HENT: Negative for congestion, rhinorrhea and sore throat  Eyes: Negative for discharge and redness  Respiratory: Positive for shortness of breath  Cardiovascular: Negative for chest pain, palpitations and leg swelling  Gastrointestinal: Negative for abdominal distention, abdominal pain and nausea  Endocrine: Negative for polydipsia and polyphagia  Genitourinary: Negative for dysuria  Musculoskeletal: Negative for joint swelling and myalgias  Skin: Negative for rash  Neurological: Negative for light-headedness         Historical Information   Past Medical History:   Diagnosis Date    Anesthesia complication     Difficult to wake up    Arthritis     BPH (benign prostatic hyperplasia)     urinary frequency    Cancer (HCC)     basal cell neck, face    COPD (chronic obstructive pulmonary disease) (HCC)     Full dentures     Hypertension     controlled    Kidney stone     at least 7 episodes    Liver disease     Alpha 1- enzyme deficiency - diagnosed 2002  has been on weekly replacement therapy since then    Pulmonary emphysema ([facility] [address] )     1/25/15  FEV1 - 2 45 liters or 59% of predicted    Wears glasses     for driving only     Past Surgical History:   Procedure Laterality Date    BACK SURGERY  2008    discectomy    COLONOSCOPY      COLONOSCOPY N/A 3/10/2017    Procedure: Denny Lara;  Surgeon: Jerilyn Rose MD;  Location: [place] [address] GI LAB; Service:    Carlos Eduardo Davis      removal of kidney stones    ESOPHAGOGASTRODUODENOSCOPY N/A 3/10/2017    Procedure: ESOPHAGOGASTRODUODENOSCOPY (EGD); Surgeon: Jerilyn Rose MD;  Location: [facility] GI [facility];   Service:     LITHOTRIPSY      TONSILLECTOMY      VEIN LIGATION AND STRIPPING Bilateral     1980's     Social History   History   Alcohol Use No     History   Drug Use No     History   Smoking Status    Former Smoker    Packs/day: 1 00    Years: 60 00    Quit date: 8/31/2017   Smokeless Tobacco    Never Used     Comment: quit in august 2017         Family History:   Family History   Problem Relation Age of Onset    Emphysema Mother         never smoked    Emphysema Father     Cancer Brother         colon    Colon cancer Brother     Ulcerative colitis Family     Liver disease Family        Meds/Allergies     Current Facility-Administered Medications:     amLODIPine (NORVASC) tablet 10 mg, 10 mg, Oral, Daily, Sebastian Merchant MD, 10 mg at 10/12/18 0943    enoxaparin (LOVENOX) subcutaneous injection 40 mg, 40 mg, Subcutaneous, Daily, Sebastian Merchant MD, 40 mg at 10/12/18 0943    fluticasone-vilanterol (BREO ELLIPTA) 200-25 MCG/INH inhaler 1 puff, 1 puff, Inhalation, Daily, Sebastian Merchant MD, 1 puff at 10/12/18 0942    ipratropium-albuterol (DUO-NEB) 0 5-2 5 mg/3 mL inhalation solution 3 mL, 3 mL, Nebulization, Q4H, Saul Logan MD, 3 mL at 10/12/18 2001    ipratropium-albuterol (DUO-NEB) 0 5-2 5 mg/3 mL inhalation solution 3 mL, 3 mL, Nebulization, Q2H PRN, Saul Logan MD    methylPREDNISolone sodium succinate (Solu-MEDROL) injection 40 mg, 40 mg, Intravenous, Q8H [address], Calista Che MD    ondansetron [facility]) injection 4 mg, 4 mg, Intravenous, Q6H PRN, Saul Logan MD    pantoprazole (PROTONIX) EC tablet 40 mg, 40 mg, Oral, Early Morning, Saul Logan MD, 40 mg at 10/12/18 7775    simethicone (MYLICON) chewable tablet 80 mg, 80 mg, Oral, 4x Daily PRN, Saul Logan MD, 80 mg at 10/12/18 2028    sucralfate (CARAFATE) oral suspension 1,000 mg, 1,000 mg, Oral, 4x Daily (AC & HS), Saul Logan MD, 1,000 mg at 10/12/18 1746    tamsulosin (FLOMAX) capsule 0 4 mg, 0 4 mg, Oral, Daily, Saul Logan MD, 0 4 mg at 10/12/18 7477    Prior to Admission medications    Medication Sig Start Date End Date Taking?  Authorizing Provider   albuterol (2 5 mg/3 mL) 0 083 % nebulizer solution Inhale 1 each every 4 (four) hours as needed 4/25/17  Yes Historical Provider, MD   albuterol (PROVENTIL HFA,VENTOLIN HFA) 90 mcg/act inhaler Inhale 2 puffs every 6 (six) hours as needed for wheezing 1/31/18   Maame Rapp MD   Alpha1-Proteinase Inhibitor (ZEMAIRA IV) Infuse into a venous catheter once a week On tuesday -1000mg /50 ml     Historical Provider, MD   amLODIPine (NORVASC) 10 mg tablet TAKE ONE TABLET BY MOUTH ONE TIME DAILY  9/17/18   Toño Crowell MD   amLODIPine (NORVASC) 5 mg tablet Take 1 tablet (5 mg total) by mouth daily  Patient not taking: Reported on 10/2/2018  5/24/18   Toño Crowell MD   apixaban (ELIQUIS) 5 mg Take 1 tablet (5 mg total) by mouth 2 (two) times a day  Patient not taking: Reported on 10/2/2018  2/18/18   Lawrence Noriega MD   finasteride (PROSCAR) 5 mg tablet Take 5 mg by mouth daily 6/20/18   Historical Provider, MD   fluticasone [place] Badder) 50 mcg/act nasal spray 2 sprays into each nostril daily 7/30/18   Glory Azevedo MD   fluticasone-salmeterol (ADVAIR DISKUS) 250-50 mcg/dose inhaler Inhale 1 puff 2 (two) times a day      Historical Provider, MD   INCRU ELLIPTA 62 5 MCG/INH AEPB inhaler INHALE ONE PUFF BY MOUTH ONE TIME DAILY  9/10/18   Lico Richardson MD   meclizine (ANTIVERT) 12 5 MG tablet Take 1 tablet by mouth daily 4/25/17   Historical Provider, MD   omeprazole (PriLOSEC) 40 MG capsule TAKE ONE CAPSULE BY MOUTH TWICE DAILY  9/6/18   Glory Azevedo MD   sucralfate (CARAFATE) 1 g/10 mL suspension Take 10 mL (1 g total) by mouth 4 (four) times a day 10/2/18   Glory Azevedo MD   tamsulosin (FLOMAX) 0 4 mg TAKE ONE CAPSULE BY MOUTH ONE TIME DAILY  7/30/18   Glory Azevedo MD   theophylline (ESTEFANIA-24) 400 MG 24 hr capsule Take 1 capsule (400 mg total) by mouth daily 5/24/18   Glory Azevedo MD   [facility] injection  5/9/18   Historical Provider, MD   zolpidem (AMBIEN CR) 6 25 MG CR tablet Take 10 mg by mouth daily at bedtime as needed for sleep    Historical Provider, MD   zolpidem (AMBIEN) 10 mg tablet TAKE ONE TABLET BY MOUTH AT BEDTIME  7/23/18   Glory Azevedo MD       Allergies   Allergen Reactions    Chantix [Varenicline]      Caused prostate infection       Objective   Vitals: Blood pressure 141/79, pulse 76, temperature 98 7 °F (37 1 °C), temperature source Oral, resp  rate 20, height 6' 5" (1 956 m), weight 78 4 kg (172 lb 13 5 oz), SpO2 95 %  ,Body mass index is 20 5 kg/m²  Intake/Output Summary (Last 24 hours) at 10/12/18 2042  Last data filed at 10/12/18 5091   Gross per 24 hour   Intake                0 ml   Output              500 ml   Net             -500 ml     Invasive Devices     Peripheral Intravenous Line            Peripheral IV 10/11/18 Right Antecubital 1 day                Physical Exam   Constitutional: He is oriented to person, place, and time  He appears well-developed   No distress  On 3 L of oxygen O2 saturation is 97%, patient is thin   HENT:   Head: Normocephalic  Nose: Nose normal    Mouth/Throat: Oropharynx is clear and moist  No oropharyngeal exudate  Eyes: Pupils are equal, round, and reactive to light  Conjunctivae are normal    Neck: Neck supple  No JVD present  No tracheal deviation present  Cardiovascular: Normal rate, regular rhythm and normal heart sounds  Pulmonary/Chest: Effort normal    Lung sounds are diminished bilaterally but clear   Abdominal: Soft  He exhibits no distension  There is no tenderness  There is no guarding  Musculoskeletal: He exhibits no edema  Lymphadenopathy:     He has no cervical adenopathy  Neurological: He is alert and oriented to person, place, and time  Skin: Skin is warm and dry  No rash noted  Psychiatric: He has a normal mood and affect   His behavior is normal  Thought content normal        Lab Results: ABG: Lab Results   Component Value Date    PHART 7 429 03/29/2018    UFU5PUG 35 4 (L) 03/29/2018    PO2ART 196 7 (H) 03/29/2018    MOV1WAZ 22 9 03/29/2018    BEART -0 9 03/29/2018    SOURCE Radial, Left 03/29/2018   , BNP: No results found for: BNP, CBC: Lab Results   Component Value Date    WBC 6 38 10/12/2018    HGB 14 1 10/12/2018    HCT 42 3 10/12/2018    MCV 94 10/12/2018     10/12/2018    MCH 31 2 10/12/2018    MCHC 33 3 10/12/2018    RDW 12 9 10/12/2018    MPV 9 0 10/12/2018    NRBC 0 10/12/2018   , CMP: Lab Results   Component Value Date     10/12/2018     09/27/2016    K 4 3 10/12/2018    K 4 2 09/27/2016     10/12/2018     09/27/2016    CO2 23 10/12/2018    CO2 23 09/27/2016    BUN 16 10/12/2018    BUN 14 09/27/2016    CREATININE 1 11 10/12/2018    CREATININE 1 09 09/27/2016    GLUCOSE 89 09/27/2016    CALCIUM 8 9 10/12/2018    CALCIUM 9 3 09/27/2016    AST 19 10/11/2018    AST 18 09/27/2016    ALT 36 10/11/2018    ALT 16 09/27/2016    ALKPHOS 71 10/11/2018    ALKPHOS 57 09/27/2016    PROT 6 9 09/27/2016    BILITOT 1 0 09/27/2016    EGFR 65 10/12/2018   , PT/INR:   Lab Results   Component Value Date    INR 1 03 10/11/2018   , Troponin: No results found for: TROPONIN    Imaging Studies: I have personally reviewed pertinent reports  and I have personally reviewed pertinent films in PACS    EKG, Pathology, and Other Studies: I have personally reviewed pertinent reports  VTE Prophylaxis: Enoxaparin (Lovenox)    Code Status: Level 1 - Full Code    Counseling/Coordination of Care: Total floor / unit time spent today 30 minutes  Greater than 50% of total time was spent with the patient and / or family counseling and / or coordination of care   A description of the counseling / coordination of care: I reviewed patient's CT of chest, chart, and discussed diagnosis and treatment with his medical team

## 2018-10-13 NOTE — RESPIRATORY THERAPY NOTE
RT Protocol Note  Homero Rajan 75 y o  male MRN: [id number]  Unit/Bed#: 2 [place] [address] Encounter: [id number]    Assessment    Principal Problem:    Dyspnea on exertion  Active Problems:    AAT (alpha-1-antitrypsin) deficiency (HCC)    Gastroesophageal reflux disease without esophagitis    HTN (hypertension)    BPH (benign prostatic hyperplasia)    Lung nodules    Weakness generalized      Home Pulmonary Medications:  Nebs pulm meds  Home Devices/Therapy: Home O2    Past Medical History:   Diagnosis Date    Anesthesia complication     Difficult to wake up    Arthritis     BPH (benign prostatic hyperplasia)     urinary frequency    Cancer (HCC)     basal cell neck, face    COPD (chronic obstructive pulmonary disease) (HCC)     Full dentures     Hypertension     controlled    Kidney stone     at least 7 episodes    Liver disease     Alpha 1- enzyme deficiency - diagnosed 2002  has been on weekly replacement therapy since then    Pulmonary emphysema ([address] )     1/25/15  FEV1 - 2 45 liters or 59% of predicted    Wears glasses     for driving only     Social History     Social History    Marital status:      Spouse name: N/A    Number of children: N/A    Years of education: N/A     Social History Main Topics    Smoking status: Former Smoker     Packs/day: 1 00     Years: 60 00     Quit date: 8/31/2017    Smokeless tobacco: Never Used      Comment: quit in august 2017    Alcohol use No    Drug use: No    Sexual activity: Not Asked     Other Topics Concern    None     Social History Narrative    None       Subjective         Objective    Physical Exam:        Vitals:  Blood pressure 157/76, pulse 84, temperature 97 7 °F (36 5 °C), temperature source Oral, resp  rate 22, height 6' 5" (1 956 m), weight 78 4 kg (172 lb 13 5 oz), SpO2 94 %  Imaging and other studies: I have personally reviewed pertinent reports  O2 Device: NC     Plan    Respiratory Plan:  Moderate/Severe Distress pathway

## 2018-10-13 NOTE — CONSULTS
Consultation - 126 [facility]  John Bertrand 75 y o  male MRN: [id number]  Unit/Bed#: 2 [place] [address] Encounter: [id number]        Consults    Reason for Consult / Principal Problem: GERD      ASSESSMENT AND PLAN:      GERD  N/V  Epigastric pain  -Patient with chronic GI upset, history of PUD, reports worsening of symptoms over the last few weeks  He is currently admitted with a COPD exacerbation  -increase PPI to BID  -continue carafate  -feel that patient would benefit from EGD to rule out PUD, gastritis, however would prefer this to be completed when his respiratory status has improved  This could be as an outpatient versus inpatient based on his symptoms  -zofran PRN  -Will check RUQ U/S to rule out gallstones/cholecystitis    ______________________________________________________________________    HPI:  Katelyn Gee is a 74 y/o male with a history of alpha 1 antitrypsin deficiency with weekly replacement, HTN, COPD, BPH, GERD, lung nodules, PE who is admitted with severe shortness of breath and dyspnea on exertion secondary COPD exacerbation  He also reported epigastric discomfort and worsening GERD that has been present chronically but worsened over the last several weeks  He has vomiting about once a week and does have nausea  He is on prilosec once daily chronically at home  Carafate was added by his PCP 10/2 however did not help with symptoms  It is not worse with eating  It is intermittent  It is rated as mild to moderate  His last EGD was 3/10/17 and it was normal  He reports a history of ulcers  He reports very rare NSAID use  He had a colonoscopy at that time with AVMs that were cauterized and hyperplastic polyps removed  He reports chronic loose stools for over 30 years, this has not changed he denies melena or hematochezia  LFTs unremarkable with the exception of a bilirubin of 1 5  CTA should no intraabdominal pathology  No evidence of cirrhosis       REVIEW OF SYSTEMS:    CONSTITUTIONAL: Denies any fever, chills  HEENT: No hearing loss  CARDIOVASCULAR: No chest pain or palpitations  RESPIRATORY: + shortness of breath or dyspnea on exertion  GASTROINTESTINAL: As noted in the History of Present Illness  GENITOURINARY: No problems with urination  NEUROLOGIC: No dizziness or vertigo, denies headaches  MUSCULOSKELETAL: Denies any muscle or joint pain  SKIN: Denies skin rashes or itching  ENDOCRINE: Denies excessive thirst  Denies intolerance to heat or cold  PSYCHOSOCIAL: Denies depression or anxiety  Denies any recent memory loss  Historical Information   Past Medical History:   Diagnosis Date    Anesthesia complication     Difficult to wake up    Arthritis     BPH (benign prostatic hyperplasia)     urinary frequency    Cancer (HCC)     basal cell neck, face    COPD (chronic obstructive pulmonary disease) (HCC)     Full dentures     Hypertension     controlled    Kidney stone     at least 7 episodes    Liver disease     Alpha 1- enzyme deficiency - diagnosed 2002  has been on weekly replacement therapy since then    Pulmonary emphysema ([facility] [address] )     1/25/15  FEV1 - 2 45 liters or 59% of predicted    Wears glasses     for driving only     Past Surgical History:   Procedure Laterality Date    BACK SURGERY  2008    discectomy    COLONOSCOPY      COLONOSCOPY N/A 3/10/2017    Procedure: Zackery Nones;  Surgeon: Yadira Barrera MD;  Location: [facility] GI LAB; Service:    Ean Sen      removal of kidney stones    ESOPHAGOGASTRODUODENOSCOPY N/A 3/10/2017    Procedure: ESOPHAGOGASTRODUODENOSCOPY (EGD); Surgeon: Yadira Barrera MD;  Location: [facility] GI LAB;   Service:     LITHOTRIPSY      TONSILLECTOMY      VEIN LIGATION AND STRIPPING Bilateral     1980's     Social History   History   Alcohol Use No     History   Drug Use No     History   Smoking Status    Former Smoker    Packs/day: 1 00    Years: 60 00    Quit date: 8/31/2017   Smokeless Tobacco    Never Used     Comment: quit in august 2017     Family History   Problem Relation Age of Onset    Emphysema Mother         never smoked    Emphysema Father     Cancer Brother         colon    Colon cancer Brother     Ulcerative colitis Family     Liver disease Family        Meds/Allergies     Prescriptions Prior to Admission   Medication    albuterol (2 5 mg/3 mL) 0 083 % nebulizer solution    albuterol (PROVENTIL HFA,VENTOLIN HFA) 90 mcg/act inhaler    Alpha1-Proteinase Inhibitor (ZEMAIRA IV)    amLODIPine (NORVASC) 10 mg tablet    amLODIPine (NORVASC) 5 mg tablet    apixaban (ELIQUIS) 5 mg    finasteride (PROSCAR) 5 mg tablet    fluticasone (FLONASE) 50 mcg/act nasal spray    fluticasone-salmeterol (ADVAIR DISKUS) 250-50 mcg/dose inhaler    INCRUSE ELLIPTA 62 5 MCG/INH AEPB inhaler    meclizine (ANTIVERT) 12 5 MG tablet    omeprazole (PriLOSEC) 40 MG capsule    sucralfate (CARAFATE) 1 g/10 mL suspension    tamsulosin (FLOMAX) 0 4 mg    theophylline (ESTEFANIA-24) 400 MG 24 hr capsule    ZEMAIRA injection    zolpidem (AMBIEN CR) 6 25 MG CR tablet    zolpidem (AMBIEN) 10 mg tablet     Current Facility-Administered Medications   Medication Dose Route Frequency    amLODIPine (NORVASC) tablet 10 mg  10 mg Oral Daily    enoxaparin (LOVENOX) subcutaneous injection 40 mg  40 mg Subcutaneous Daily    fluticasone-vilanterol (BREO ELLIPTA) 200-25 MCG/INH inhaler 1 puff  1 puff Inhalation Daily    ipratropium-albuterol (DUO-NEB) 0 5-2 5 mg/3 mL inhalation solution 3 mL  3 mL Nebulization Q4H    ipratropium-albuterol (DUO-NEB) 0 5-2 5 mg/3 mL inhalation solution 3 mL  3 mL Nebulization Q2H PRN    methylPREDNISolone sodium succinate (Solu-MEDROL) injection 40 mg  40 mg Intravenous Q8H [address]    ondansetron (ZOFRAN) injection 4 mg  4 mg Intravenous Q6H PRN    pantoprazole (PROTONIX) EC tablet 40 mg  40 mg Oral Early Morning    simethicone (MYLICON) chewable tablet 80 mg  80 mg Oral 4x Daily PRN    sucralfate (CARAFATE) oral suspension 1,000 mg  1,000 mg Oral 4x Daily (AC & HS)    tamsulosin (FLOMAX) capsule 0 4 mg  0 4 mg Oral Daily    zolpidem (AMBIEN) tablet 10 mg  10 mg Oral HS PRN       Allergies   Allergen Reactions    Chantix [Varenicline]      Caused prostate infection           Objective     Blood pressure 154/77, pulse 74, temperature 98 °F (36 7 °C), temperature source Oral, resp  rate 18, height 6' 5" (1 956 m), weight 78 4 kg (172 lb 13 5 oz), SpO2 98 %  Body mass index is 20 5 kg/m²        Intake/Output Summary (Last 24 hours) at 10/13/18 1314  Last data filed at 10/13/18 0900   Gross per 24 hour   Intake                0 ml   Output              650 ml   Net             -650 ml         PHYSICAL EXAM:      General Appearance:   Alert, cooperative, no distress   HEENT:   Normocephalic, atraumatic, anicteric      Neck:  Supple, symmetrical, trachea midline   Lungs:   Decreased breath sounds with expiratory wheezes, O2 NC   Heart[de-identified]   Regular rate and rhythm   Abdomen:   Firm, non-tender, mildly distended; normal bowel sounds   Genitalia:   Deferred    Rectal:   Deferred    Extremities:  No edema    Pulses:  2+ and symmetric all extremities    Skin:  No jaundice, rashes, or lesions    Lymph nodes:  No palpable cervical lymphadenopathy        Lab Results:   Admission on 10/11/2018   Component Date Value    WBC 10/11/2018 7 38     RBC 10/11/2018 4 34     Hemoglobin 10/11/2018 13 9     Hematocrit 10/11/2018 40 5     MCV 10/11/2018 93     MCH 10/11/2018 32 0     MCHC 10/11/2018 34 3     RDW 10/11/2018 13 1     MPV 10/11/2018 8 8*    Platelets 37/06/8280 199     nRBC 10/11/2018 0     Neutrophils Relative 10/11/2018 66     Immat GRANS % 10/11/2018 0     Lymphocytes Relative 10/11/2018 22     Monocytes Relative 10/11/2018 8     Eosinophils Relative 10/11/2018 3     Basophils Relative 10/11/2018 1     Neutrophils Absolute 10/11/2018 4 87     Immature Grans Absolute 10/11/2018 0 03     Lymphocytes Absolute 10/11/2018 1 62     Monocytes Absolute 10/11/2018 0 58     Eosinophils Absolute 10/11/2018 0 21     Basophils Absolute 10/11/2018 0 07     Protime 10/11/2018 10 8     INR 10/11/2018 1 03     PTT 10/11/2018 27     Sodium 10/11/2018 141     Potassium 10/11/2018 3 3*    Chloride 10/11/2018 102     CO2 10/11/2018 26     ANION GAP 10/11/2018 13     BUN 10/11/2018 18     Creatinine 10/11/2018 1 24     Glucose 10/11/2018 128     Calcium 10/11/2018 9 6     AST 10/11/2018 19     ALT 10/11/2018 36     Alkaline Phosphatase 10/11/2018 71     Total Protein 10/11/2018 7 5     Albumin 10/11/2018 4 2     Total Bilirubin 10/11/2018 1 50*    eGFR 10/11/2018 57     Troponin I 10/11/2018 <0 02     MRSA Culture Only 10/11/2018 Culture results to follow       Strep pneumoniae antigen* 10/12/2018 Negative     Legionella Urinary Antig* 10/12/2018 Negative     Sodium 10/12/2018 137     Potassium 10/12/2018 4 3     Chloride 10/12/2018 104     CO2 10/12/2018 23     ANION GAP 10/12/2018 10     BUN 10/12/2018 16     Creatinine 10/12/2018 1 11     Glucose 10/12/2018 160*    Calcium 10/12/2018 8 9     eGFR 10/12/2018 65     Magnesium 10/12/2018 1 9     Phosphorus 10/12/2018 1 9*    WBC 10/12/2018 6 38     RBC 10/12/2018 4 52     Hemoglobin 10/12/2018 14 1     Hematocrit 10/12/2018 42 3     MCV 10/12/2018 94     MCH 10/12/2018 31 2     MCHC 10/12/2018 33 3     RDW 10/12/2018 12 9     MPV 10/12/2018 9 0     Platelets 25/67/4653 188     nRBC 10/12/2018 0     Neutrophils Relative 10/12/2018 86*    Immat GRANS % 10/12/2018 1     Lymphocytes Relative 10/12/2018 11*    Monocytes Relative 10/12/2018 2*    Eosinophils Relative 10/12/2018 0     Basophils Relative 10/12/2018 0     Neutrophils Absolute 10/12/2018 5 55     Immature Grans Absolute 10/12/2018 0 04     Lymphocytes Absolute 10/12/2018 0 67     Monocytes Absolute 10/12/2018 0 11*    Eosinophils Absolute 10/12/2018 0 00     Basophils Absolute 10/12/2018 0 01     WBC 10/13/2018 11 20*    RBC 10/13/2018 4 46     Hemoglobin 10/13/2018 14 4     Hematocrit 10/13/2018 42 3     MCV 10/13/2018 95     MCH 10/13/2018 32 3     MCHC 10/13/2018 34 0     RDW 10/13/2018 13 1     MPV 10/13/2018 9 0     Platelets 59/25/1660 244     nRBC 10/13/2018 0     Neutrophils Relative 10/13/2018 90*    Immat GRANS % 10/13/2018 1     Lymphocytes Relative 10/13/2018 7*    Monocytes Relative 10/13/2018 2*    Eosinophils Relative 10/13/2018 0     Basophils Relative 10/13/2018 0     Neutrophils Absolute 10/13/2018 10 14*    Immature Grans Absolute 10/13/2018 0 07     Lymphocytes Absolute 10/13/2018 0 76     Monocytes Absolute 10/13/2018 0 21     Eosinophils Absolute 10/13/2018 0 00     Basophils Absolute 10/13/2018 0 02     Sodium 10/13/2018 137     Potassium 10/13/2018 4 2     Chloride 10/13/2018 103     CO2 10/13/2018 22     ANION GAP 10/13/2018 12     BUN 10/13/2018 24     Creatinine 10/13/2018 1 25     Glucose 10/13/2018 141*    Calcium 10/13/2018 9 3     eGFR 10/13/2018 56     Magnesium 10/13/2018 2 0     Phosphorus 10/13/2018 3 4        Imaging Studies: I have personally reviewed pertinent imaging studies

## 2018-10-13 NOTE — PROGRESS NOTES
[facility] Practice Progress Note - Fransisco Scott 75 y o  male MRN: [id number]    Unit/Bed#: 2 [place] [address] Encounter: [id number]      Assessment/Plan:    * Chronic respiratory failure with hypoxia, on home O2 therapy ([address] )   Assessment & Plan    - Pt states he has had worsening SOB especially on exertion since 3 days  He is on 3L O2 at home   -ED:Prednisone 60 mg PO, Duo-neb 3 ml x2, 1 L NS  -CXR: Left basilar streaky density possibly subsegmental atelectasis  -CT: No evidence PE, Several small pulmonary nodules   New left upper lobe pulmonary nodule  Follow-up CT scan in 6 months is recommended   -Solumedrol 40 mg IV q8 hours  -Consult Pulmonology: Appreciate recommendations   Continue with current bronchodilators / steroid therapy   If pt continue to improve tomorrow, would taper steroids to q 12   -Continue 3L O2 via NC  - Duo-Neb q4h scheduled with peak flow, and Duo-Neb q2h PRN  -urine strep and legionella both negative  -continue home inhaler PRN  -prednisone taper upon discharge     Gastroesophageal reflux disease without esophagitis   Assessment & Plan    - Pt reports Nausea and mild epigastric discomfort  - nausea: Zofran 4 mg IV PRN  - Recently started on Sucralfate 10 ml QiD w/out relieve  Patient taking Prilosec 40 mg qd  - GI consulted    - States he is due for upper GI endoscopy in January  - Continue home meds       Weakness generalized   Assessment & Plan    - Pt states to have decreased appetite over a month, feels so weak he can barely get out of bed  - Vitals on arrival: 170/92, pulse 80, temp 98 5, resp 23, 97%  - Prior K+ 3 3, wasreplete with K-dur 40 mEq PO    -  This morning K=4 2  - Monitor electrolytes and vitals through hospital stay  - Consult PT/OT     Lung nodules   Assessment & Plan    -CT: Several small pulmonary nodules are again visualized   Right upper lobe nodule measuring 8 mm is unchanged   4 mm right lower lobe nodule is noted   2 mm left lower lobe nodule is unchanged   Several adjacent   triangular-shaped right lower lobe   Visual nodules are again visualized   Bulla is seen in the right lower lobe  Azalia Dank is a new left upper lobe pulmonary nodule measuring 5 mm  -Continue O/p follow up with Dr Long   -Repeat CT in 6 months outpatient     BPH (benign prostatic hyperplasia)   Assessment & Plan    Stable  -Reports no changes in urination   -Continue home med: Flomax 0 4 mg qd     HTN (hypertension)   Assessment & Plan    -/92  - Continue home med: Amlodipine 10 mg qd  - will monitor BP     AAT (alpha-1-antitrypsin) deficiency (HCC)   Assessment & Plan    - Continue Zemaira Weekly outpatient         Subjective:   Patient seen examined at bedside  Complains of mild persistent epigastric pain and shortness of breath with ambulation  Patient denies CP, HA, palpitations, lightheadedness, dizziness, fevers and chills  Objective:     Vitals: Blood pressure 137/65, pulse 76, temperature 98 1 °F (36 7 °C), temperature source Oral, resp  rate 19, height 6' 5" (1 956 m), weight 78 4 kg (172 lb 13 5 oz), SpO2 97 %  ,Body mass index is 20 5 kg/m²  Wt Readings from Last 3 Encounters:   10/11/18 78 4 kg (172 lb 13 5 oz)   10/02/18 82 6 kg (182 lb)   18 82 6 kg (182 lb)       Intake/Output Summary (Last 24 hours) at 10/13/18 1721  Last data filed at 10/13/18 0900   Gross per 24 hour   Intake                0 ml   Output              650 ml   Net             -650 ml       Physical Exam:   Physical Exam   Constitutional: He appears well-developed and well-nourished  No distress  HENT:   Head: Normocephalic and atraumatic  Eyes: Conjunctivae are normal    Cardiovascular: Normal rate  Pulmonary/Chest: No respiratory distress  Decreased air entry   Abdominal: Soft  He exhibits no distension  There is no tenderness  There is no guarding  Musculoskeletal: He exhibits no deformity  Neurological: He is alert  Skin: Skin is warm and dry           Recent Results (from the past 24 hour(s))   CBC and differential    Collection Time: 10/13/18  4:56 AM   Result Value Ref Range    WBC 11 20 (H) 4 31 - 10 16 Thousand/uL    RBC 4 46 3 88 - 5 62 Million/uL    Hemoglobin 14 4 12 0 - 17 0 g/dL    Hematocrit 42 3 36 5 - 49 3 %    MCV 95 82 - 98 fL    MCH 32 3 26 8 - 34 3 pg    MCHC 34 0 31 4 - 37 4 g/dL    RDW 13 1 11 6 - 15 1 %    MPV 9 0 8 9 - 12 7 fL    Platelets 127 128 - 790 Thousands/uL    nRBC 0 /100 WBCs    Neutrophils Relative 90 (H) 43 - 75 %    Immat GRANS % 1 0 - 2 %    Lymphocytes Relative 7 (L) 14 - 44 %    Monocytes Relative 2 (L) 4 - 12 %    Eosinophils Relative 0 0 - 6 %    Basophils Relative 0 0 - 1 %    Neutrophils Absolute 10 14 (H) 1 85 - 7 62 Thousands/µL    Immature Grans Absolute 0 07 0 00 - 0 20 Thousand/uL    Lymphocytes Absolute 0 76 0 60 - 4 47 Thousands/µL    Monocytes Absolute 0 21 0 17 - 1 22 Thousand/µL    Eosinophils Absolute 0 00 0 00 - 0 61 Thousand/µL    Basophils Absolute 0 02 0 00 - 0 10 Thousands/µL   Basic metabolic panel    Collection Time: 10/13/18  4:56 AM   Result Value Ref Range    Sodium 137 136 - 145 mmol/L    Potassium 4 2 3 5 - 5 3 mmol/L    Chloride 103 100 - 108 mmol/L    CO2 22 21 - 32 mmol/L    ANION GAP 12 4 - 13 mmol/L    BUN 24 5 - 25 mg/dL    Creatinine 1 25 0 60 - 1 30 mg/dL    Glucose 141 (H) 65 - 140 mg/dL    Calcium 9 3 8 3 - 10 1 mg/dL    eGFR 56 ml/min/1 73sq m   Magnesium    Collection Time: 10/13/18  4:56 AM   Result Value Ref Range    Magnesium 2 0 1 6 - 2 6 mg/dL   Phosphorus    Collection Time: 10/13/18  4:56 AM   Result Value Ref Range    Phosphorus 3 4 2 3 - 4 1 mg/dL       Current Facility-Administered Medications   Medication Dose Route Frequency Provider Last Rate Last Dose    amLODIPine (NORVASC) tablet 10 mg  10 mg Oral Daily Nomi Posada MD   10 mg at 10/13/18 0928    enoxaparin (LOVENOX) subcutaneous injection 40 mg  40 mg Subcutaneous Daily Nomi Posada MD   40 mg at 10/13/18 1314    fluticasone-vilanterol (BREO ELLIPTA) 200-25 MCG/INH inhaler 1 puff  1 puff Inhalation Daily Lauren Oquendo MD   1 puff at 10/13/18 0928    ipratropium-albuterol (DUO-NEB) 0 5-2 5 mg/3 mL inhalation solution 3 mL  3 mL Nebulization Q4H Lauren Oquendo MD   3 mL at 10/13/18 1516    ipratropium-albuterol (DUO-NEB) 0 5-2 5 mg/3 mL inhalation solution 3 mL  3 mL Nebulization Q2H PRN Lauren Oquendo MD        methylPREDNISolone sodium succinate (Solu-MEDROL) injection 40 mg  40 mg Intravenous [facility] Macy Lee MD   40 mg at 10/13/18 1441    ondansetron (ZOFRAN) injection 4 mg  4 mg Intravenous Q6H PRN Lauren Oquendo MD        pantoprazole (PROTONIX) EC tablet 40 mg  40 mg Oral BID Ramon Nick PA-C   40 mg at 10/13/18 1711    simethicone (MYLICON) chewable tablet 80 mg  80 mg Oral 4x Daily PRN Lauren Oquendo MD   80 mg at 10/12/18 2028    sucralfate (CARAFATE) oral suspension 1,000 mg  1,000 mg Oral 4x Daily (AC & HS) Lauren Oquendo MD   1,000 mg at 10/13/18 1711    tamsulosin (FLOMAX) capsule 0 4 mg  0 4 mg Oral Daily Lauren Oquendo MD   0 4 mg at 10/13/18 4123    zolpidem (AMBIEN) tablet 10 mg  10 mg Oral HS PRN Lauren Oquendo MD   10 mg at 10/12/18 2338       Invasive Devices     Peripheral Intravenous Line            Peripheral IV 10/11/18 Right Antecubital 1 day                Lab, Imaging and other studies: I have personally reviewed pertinent reports      VTE Pharmacologic Prophylaxis: Enoxaparin (Lovenox)  VTE Mechanical Prophylaxis: sequential compression device    Aron Vasquez DO

## 2018-10-14 LAB
ANION GAP SERPL CALCULATED.3IONS-SCNC: 11 MMOL/L (ref 4–13)
BUN SERPL-MCNC: 26 MG/DL (ref 5–25)
CALCIUM SERPL-MCNC: 8.7 MG/DL (ref 8.3–10.1)
CHLORIDE SERPL-SCNC: 104 MMOL/L (ref 100–108)
CO2 SERPL-SCNC: 24 MMOL/L (ref 21–32)
CREAT SERPL-MCNC: 0.98 MG/DL (ref 0.6–1.3)
ERYTHROCYTE [DISTWIDTH] IN BLOOD BY AUTOMATED COUNT: 13.1 % (ref 11.6–15.1)
GFR SERPL CREATININE-BSD FRML MDRD: 76 ML/MIN/1.73SQ M
GLUCOSE SERPL-MCNC: 138 MG/DL (ref 65–140)
HCT VFR BLD AUTO: 39.3 % (ref 36.5–49.3)
HGB BLD-MCNC: 13.2 G/DL (ref 12–17)
MAGNESIUM SERPL-MCNC: 2 MG/DL (ref 1.6–2.6)
MCH RBC QN AUTO: 32 PG (ref 26.8–34.3)
MCHC RBC AUTO-ENTMCNC: 33.6 G/DL (ref 31.4–37.4)
MCV RBC AUTO: 95 FL (ref 82–98)
MRSA NOSE QL CULT: ABNORMAL
MRSA NOSE QL CULT: ABNORMAL
PHOSPHATE SERPL-MCNC: 2.8 MG/DL (ref 2.3–4.1)
PLATELET # BLD AUTO: 197 THOUSANDS/UL (ref 149–390)
PMV BLD AUTO: 9.1 FL (ref 8.9–12.7)
POTASSIUM SERPL-SCNC: 4 MMOL/L (ref 3.5–5.3)
RBC # BLD AUTO: 4.13 MILLION/UL (ref 3.88–5.62)
SODIUM SERPL-SCNC: 139 MMOL/L (ref 136–145)
WBC # BLD AUTO: 9.13 THOUSAND/UL (ref 4.31–10.16)

## 2018-10-14 PROCEDURE — 94760 N-INVAS EAR/PLS OXIMETRY 1: CPT

## 2018-10-14 PROCEDURE — 84100 ASSAY OF PHOSPHORUS: CPT | Performed by: STUDENT IN AN ORGANIZED HEALTH CARE EDUCATION/TRAINING PROGRAM

## 2018-10-14 PROCEDURE — 85027 COMPLETE CBC AUTOMATED: CPT | Performed by: STUDENT IN AN ORGANIZED HEALTH CARE EDUCATION/TRAINING PROGRAM

## 2018-10-14 PROCEDURE — 80048 BASIC METABOLIC PNL TOTAL CA: CPT | Performed by: STUDENT IN AN ORGANIZED HEALTH CARE EDUCATION/TRAINING PROGRAM

## 2018-10-14 PROCEDURE — 99232 SBSQ HOSP IP/OBS MODERATE 35: CPT | Performed by: INTERNAL MEDICINE

## 2018-10-14 PROCEDURE — 94640 AIRWAY INHALATION TREATMENT: CPT

## 2018-10-14 PROCEDURE — 83735 ASSAY OF MAGNESIUM: CPT | Performed by: STUDENT IN AN ORGANIZED HEALTH CARE EDUCATION/TRAINING PROGRAM

## 2018-10-14 RX ORDER — METHYLPREDNISOLONE SODIUM SUCCINATE 40 MG/ML
40 INJECTION, POWDER, LYOPHILIZED, FOR SOLUTION INTRAMUSCULAR; INTRAVENOUS EVERY 12 HOURS SCHEDULED
Status: DISCONTINUED | OUTPATIENT
Start: 2018-10-14 | End: 2018-10-15

## 2018-10-14 RX ADMIN — TAMSULOSIN HYDROCHLORIDE 0.4 MG: 0.4 CAPSULE ORAL at 10:18

## 2018-10-14 RX ADMIN — ZOLPIDEM TARTRATE 10 MG: 5 TABLET, FILM COATED ORAL at 21:55

## 2018-10-14 RX ADMIN — SUCRALFATE 1000 MG: 1 SUSPENSION ORAL at 10:30

## 2018-10-14 RX ADMIN — IPRATROPIUM BROMIDE AND ALBUTEROL SULFATE 3 ML: .5; 3 SOLUTION RESPIRATORY (INHALATION) at 23:21

## 2018-10-14 RX ADMIN — SUCRALFATE 1000 MG: 1 SUSPENSION ORAL at 21:48

## 2018-10-14 RX ADMIN — IPRATROPIUM BROMIDE AND ALBUTEROL SULFATE 3 ML: .5; 3 SOLUTION RESPIRATORY (INHALATION) at 19:48

## 2018-10-14 RX ADMIN — IPRATROPIUM BROMIDE AND ALBUTEROL SULFATE 3 ML: .5; 3 SOLUTION RESPIRATORY (INHALATION) at 15:07

## 2018-10-14 RX ADMIN — SUCRALFATE 1000 MG: 1 SUSPENSION ORAL at 17:10

## 2018-10-14 RX ADMIN — PANTOPRAZOLE SODIUM 40 MG: 40 TABLET, DELAYED RELEASE ORAL at 10:18

## 2018-10-14 RX ADMIN — FLUTICASONE FUROATE AND VILANTEROL TRIFENATATE 1 PUFF: 200; 25 POWDER RESPIRATORY (INHALATION) at 10:18

## 2018-10-14 RX ADMIN — PANTOPRAZOLE SODIUM 40 MG: 40 TABLET, DELAYED RELEASE ORAL at 17:11

## 2018-10-14 RX ADMIN — AMLODIPINE BESYLATE 10 MG: 10 TABLET ORAL at 10:18

## 2018-10-14 RX ADMIN — SUCRALFATE 1000 MG: 1 SUSPENSION ORAL at 06:28

## 2018-10-14 RX ADMIN — ENOXAPARIN SODIUM 40 MG: 40 INJECTION SUBCUTANEOUS at 10:18

## 2018-10-14 RX ADMIN — METHYLPREDNISOLONE SODIUM SUCCINATE 40 MG: 40 INJECTION, POWDER, FOR SOLUTION INTRAMUSCULAR; INTRAVENOUS at 06:28

## 2018-10-14 RX ADMIN — IPRATROPIUM BROMIDE AND ALBUTEROL SULFATE 3 ML: .5; 3 SOLUTION RESPIRATORY (INHALATION) at 11:03

## 2018-10-14 RX ADMIN — IPRATROPIUM BROMIDE AND ALBUTEROL SULFATE 3 ML: .5; 3 SOLUTION RESPIRATORY (INHALATION) at 04:37

## 2018-10-14 RX ADMIN — METHYLPREDNISOLONE SODIUM SUCCINATE 40 MG: 40 INJECTION, POWDER, FOR SOLUTION INTRAMUSCULAR; INTRAVENOUS at 21:49

## 2018-10-14 RX ADMIN — IPRATROPIUM BROMIDE AND ALBUTEROL SULFATE 3 ML: .5; 3 SOLUTION RESPIRATORY (INHALATION) at 07:45

## 2018-10-14 NOTE — PROGRESS NOTES
[facility] Practice Progress Note - Elvira Moeller 75 y o  male MRN: [id number]    Unit/Bed#: 2 [place] [address] Encounter: [id number]      Assessment/Plan:  * Chronic respiratory failure with hypoxia, on home O2 therapy [facility])   Assessment & Plan    On admission, patient states he has had worsening SOB especially on exertion for 3 days  On 3L O2 at home  In ED received Prednisone 60 mg PO, Duo-neb 3 ml x2, 1 L NS   -10/11 CXR: Left basilar streaky density possibly subsegmental atelectasis  -10/11 CT: No evidence PE, Several small pulmonary nodules   New left upper lobe pulmonary nodule  F/U CT scan in 6 mo  -Urine strep/legionella negative  -Continue supplemental O2  -Pulmonology consulted, appreciate recommendations   -Solumedrol 40mg q8h tapered to 40mg q12h today   -Continue Breo   -Continue Duonebs q4h scheduled and q2h PRN   -For lung nodules, repeat CT chest outpatient in 6 months     Gastroesophageal reflux disease without esophagitis   Assessment & Plan    Patientt reports Nausea and mild epigastric discomfort  Recently started on Sucralfate 10 ml QiD w/out relief  On Prilosec at home   States he is due for EGD in January, last EGD was 3/10/17 and it was normal   -Continue sucralfate 1g QID  -Zofran PRN nausea   -GI consulted, recommendations appreciated   -Protonix increased to 40mg BID   -RUQ U/S ordered   -Will need EGD, inpatient vs outpatient to depend on progression of symptoms and improvement in respiratory status      Lung nodules   Assessment & Plan    10/11/18 CT: Several small pulmonary nodules are again visualized   Right upper lobe nodule measuring 8 mm is unchanged   4 mm right lower lobe nodule is noted   2 mm left lower lobe nodule is unchanged   Several adjacent   triangular-shaped right lower lobe   Visual nodules are again visualized  Allie Punt is seen in the right lower lobe  Gerardine Gordon is a new left upper lobe pulmonary nodule measuring 5 mm  -Continue O/p follow up with Dr Long   -Repeat CT in 6 months outpatient     Weakness generalized   Assessment & Plan    On arrival, patient admitted to have decreased appetite over a month, feels so weak he can barely get out of bed   -Monitor electrolytes and vitals through hospital stay, replete as appropriate  -PT/OT consulted, current recommendation to return home with family support/possibly services, will continue to follow      HTN (hypertension)   Assessment & Plan    -Continue home medication Amlodipine 10 mg qd     BPH (benign prostatic hyperplasia)   Assessment & Plan    Stable, reports no changes in urination   -Continue home medication Flomax 0 4 mg qd     AAT (alpha-1-antitrypsin) deficiency ([facility] [address] )   Assessment & Plan    -Continue Zemaira Weekly outpatient         Subjective:   Patient seen and examined at bedside this morning  Yesterday complained of abdominal pain, GI was consulted  No acute overnight events reported  Objective:     Vitals: Blood pressure 155/76, pulse 70, temperature 97 7 °F (36 5 °C), temperature source Oral, resp  rate 18, height 6' 5" (1 956 m), weight 78 4 kg (172 lb 13 5 oz), SpO2 94 %  ,Body mass index is 20 5 kg/m²    Wt Readings from Last 3 Encounters:   10/11/18 78 4 kg (172 lb 13 5 oz)   10/02/18 82 6 kg (182 lb)   07/06/18 82 6 kg (182 lb)       Intake/Output Summary (Last 24 hours) at 10/14/18 1137  Last data filed at 10/14/18 1819   Gross per 24 hour   Intake                0 ml   Output              725 ml   Net             -725 ml       Physical Exam: General appearance: alert and oriented, in no acute distress  Lungs: diminished breath sounds  Heart: regular rate and rhythm, S1, S2 normal, no murmur, click, rub or gallop  Abdomen: soft, non-tender; bowel sounds normal; no masses,  no organomegaly  Extremities: extremities normal, warm and well-perfused; no cyanosis, clubbing, or edema     Recent Results (from the past 24 hour(s))   CBC    Collection Time: 10/14/18  6:09 AM   Result Value Ref Range    WBC 9 13 4 31 - 10 16 Thousand/uL    RBC 4 13 3 88 - 5 62 Million/uL    Hemoglobin 13 2 12 0 - 17 0 g/dL    Hematocrit 39 3 36 5 - 49 3 %    MCV 95 82 - 98 fL    MCH 32 0 26 8 - 34 3 pg    MCHC 33 6 31 4 - 37 4 g/dL    RDW 13 1 11 6 - 15 1 %    Platelets 062 501 - 575 Thousands/uL    MPV 9 1 8 9 - 12 7 fL   Basic metabolic panel    Collection Time: 10/14/18  6:09 AM   Result Value Ref Range    Sodium 139 136 - 145 mmol/L    Potassium 4 0 3 5 - 5 3 mmol/L    Chloride 104 100 - 108 mmol/L    CO2 24 21 - 32 mmol/L    ANION GAP 11 4 - 13 mmol/L    BUN 26 (H) 5 - 25 mg/dL    Creatinine 0 98 0 60 - 1 30 mg/dL    Glucose 138 65 - 140 mg/dL    Calcium 8 7 8 3 - 10 1 mg/dL    eGFR 76 ml/min/1 73sq m   Magnesium    Collection Time: 10/14/18  6:09 AM   Result Value Ref Range    Magnesium 2 0 1 6 - 2 6 mg/dL   Phosphorus    Collection Time: 10/14/18  6:09 AM   Result Value Ref Range    Phosphorus 2 8 2 3 - 4 1 mg/dL       Current Facility-Administered Medications   Medication Dose Route Frequency Provider Last Rate Last Dose    amLODIPine (NORVASC) tablet 10 mg  10 mg Oral Daily Angeles Bililngsley MD   10 mg at 10/14/18 1018    enoxaparin (LOVENOX) subcutaneous injection 40 mg  40 mg Subcutaneous Daily Angeles Billingsley MD   40 mg at 10/14/18 1018    fluticasone-vilanterol (BREO ELLIPTA) 200-25 MCG/INH inhaler 1 puff  1 puff Inhalation Daily Angeles Billingsley MD   1 puff at 10/14/18 1018    ipratropium-albuterol (DUO-NEB) 0 5-2 5 mg/3 mL inhalation solution 3 mL  3 mL Nebulization Q4H Angeles Billingsley MD   3 mL at 10/14/18 1103    ipratropium-albuterol (DUO-NEB) 0 5-2 5 mg/3 mL inhalation solution 3 mL  3 mL Nebulization Q2H PRN Angeles Billingsley MD        methylPREDNISolone sodium succinate (Solu-MEDROL) injection 40 mg  40 mg Intravenous Q12H [facility] Anna Jacobs MD        ondansetron [facility]) injection 4 mg  4 mg Intravenous Q6H PRN Angeles Billingsley MD        pantoprazole (PROTONIX) EC tablet 40 mg  40 mg Oral BID Jesse Pedroza PA-C   40 mg at 10/14/18 1018    simethicone (MYLICON) chewable tablet 80 mg  80 mg Oral 4x Daily PRN Ming Gutierrez MD   80 mg at 10/12/18 2028    sucralfate (CARAFATE) oral suspension 1,000 mg  1,000 mg Oral 4x Daily (AC & HS) Ming Gutierrez MD   1,000 mg at 10/14/18 1030    tamsulosin (FLOMAX) capsule 0 4 mg  0 4 mg Oral Daily Ming Gutierrez MD   0 4 mg at 10/14/18 1018    zolpidem (AMBIEN) tablet 10 mg  10 mg Oral HS PRN Ming Gutierrez MD   10 mg at 10/13/18 2248       Invasive Devices     Peripheral Intravenous Line            Peripheral IV 10/11/18 Right Antecubital 2 days                Lab, Imaging and other studies: I have personally reviewed pertinent reports      VTE Pharmacologic Prophylaxis: Enoxaparin (Lovenox)  VTE Mechanical Prophylaxis: sequential compression device    Jake Alberto DO

## 2018-10-14 NOTE — PROGRESS NOTES
Progress Note - Pulmonary   Berneda Dad 75 y o  male MRN: [id number]  Unit/Bed#: 1055 [facility] Encounter: [id number]      Assessment/Plan:     1  COPD Exacerbation / Alpha 1 antitrypsin deficiency (PiSZ) on replacement therapy:  Currently pt feels that he is improving however still getting short of breath with minimal exertion  Continue with current bronchodilators therapy  Would taper steroids to q 12 today     2  Lung Nodules: For repeat ct chest as outpt in 6 months        ______________________________________________________________________    Subjective: Pt seen and examined at bedside  Tele Events:     Vitals:   Temp:  [97 7 °F (36 5 °C)-98 7 °F (37 1 °C)] 97 7 °F (36 5 °C)  HR:  [70-84] 70  Resp:  [18-19] 18  BP: (137-155)/(65-76) 155/76  Weight (last 2 days)     None        Oxygen Therapy  SpO2: 95 %  O2 Flow Rate (L/min): 3 L/min    IV Infusions:       Nutrition:        Diet Orders            Start     Ordered    10/12/18 0838  Room Service  Once     Question:  Type of Service  Answer:  Room Service-Appropriate    10/12/18 1538    10/11/18 2357  Diet Regular; Regular House  Diet effective now     Question Answer Comment   Diet Type Regular    Regular Regular House    RD to adjust diet per protocol?  Yes        10/11/18 2356          Ins/Outs:   I/O       10/12 0701 - 10/13 0700 10/13 0701 - 10/14 0700 10/14 0701 - 10/15 0700    Urine (mL/kg/hr) 200 (0 1) 1175 (0 6)     Total Output 200 1175      Net -200 -1175                   Lines/Drains:  Invasive Devices     Peripheral Intravenous Line            Peripheral IV 10/11/18 Right Antecubital 2 days                 Active medications:  Scheduled Meds:  Current Facility-Administered Medications:  amLODIPine 10 mg Oral Daily Edgardo Flowers MD   enoxaparin 40 mg Subcutaneous Daily Edgardo Flowers MD   fluticasone-vilanterol 1 puff Inhalation Daily Edgardo Flowers MD   ipratropium-albuterol 3 mL Nebulization Q4H Edgardo Flowers MD   ipratropium-albuterol 3 mL Nebulization Q2H PRN Chely Daniels MD   methylPREDNISolone sodium succinate 40 mg Intravenous Q12H [address] Rose Chase MD   ondansetron 4 mg Intravenous Q6H PRN Chely Daniels MD   pantoprazole 40 mg Oral BID Baldemar Mcbride PA-C   simethicone 80 mg Oral 4x Daily PRN Chely Daniels MD   sucralfate 1,000 mg Oral 4x Daily (AC & HS) Chely Daniels MD   tamsulosin 0 4 mg Oral Daily Chely Daniels MD   zolpidem 10 mg Oral HS PRN Chely Daniels MD     PRN Meds:  ipratropium-albuterol 3 mL Q2H PRN   ondansetron 4 mg Q6H PRN   simethicone 80 mg 4x Daily PRN   zolpidem 10 mg HS PRN     ____________________________________________________________________      Physical Exam   Constitutional: He is oriented to person, place, and time  He appears well-developed and well-nourished  HENT:   Head: Normocephalic and atraumatic  Eyes: Pupils are equal, round, and reactive to light  Conjunctivae are normal    Neck: Normal range of motion  Neck supple  No JVD present  Cardiovascular: Normal rate  Pulmonary/Chest: Effort normal  He has no wheezes  Decreased airentry b/l   Abdominal: Soft  Bowel sounds are normal    Musculoskeletal: Normal range of motion  Neurological: He is alert and oriented to person, place, and time  Psychiatric: He has a normal mood and affect  His behavior is normal    Nursing note and vitals reviewed            ____________________________________________________________________    Labs:   CBC:   Results from last 7 days  Lab Units 10/14/18  0609 10/13/18  0456 10/12/18  0447   WBC Thousand/uL 9 13 11 20* 6 38   HEMOGLOBIN g/dL 13 2 14 4 14 1   HEMATOCRIT % 39 3 42 3 42 3   MCV fL 95 95 94   PLATELETS Thousands/uL 197 244 188     CMP:   Results from last 7 days  Lab Units 10/14/18  0609 10/13/18  0456 10/12/18  0447 10/11/18  1839   SODIUM mmol/L 139 137 137 141   POTASSIUM mmol/L 4 0 4 2 4 3 3 3*   CHLORIDE mmol/L 104 103 104 102   CO2 mmol/L 24 22 23 26   BUN mg/dL 26* 24 16 18   CREATININE mg/dL 0 98 1 25 1 11 1 24   CALCIUM mg/dL 8 7 9 3 8 9 9 6   AST U/L  --   --   --  19   ALT U/L  --   --   --  36   ALK PHOS U/L  --   --   --  71   EGFR ml/min/1 73sq m 76 56 65 57     Magnesium:   Results from last 7 days  Lab Units 10/14/18  0609   MAGNESIUM mg/dL 2 0     Phosphorous:   Results from last 7 days  Lab Units 10/14/18  0609   PHOSPHORUS mg/dL 2 8     Troponin:   Results from last 7 days  Lab Units 10/11/18  1839   TROPONIN I ng/mL <0 02     PT/INR:   Results from last 7 days  Lab Units 10/11/18  1839   PTT seconds 27   INR  1 03     Lactic Acid:     BNP:     TSH:     Procalcitonin: Invalid input(s): PROCALCITONIN      Imaging: Pts radiographs reviewed by me        Micro: Lab Results   Component Value Date    BLOODCX No Growth After 5 Days  12/29/2017    BLOODCX No Growth After 5 Days  12/29/2017    MRSACULTURE (A) 10/11/2018     Methicillin Resistant Staphylococcus aureus isolated    MRSACULTURE  10/11/2018     Please note: This patient requires contact precautions              Invalid input(s): LEGIONELLAURINARYANTIGEN        Code Status: Level 1 - Full Code

## 2018-10-15 LAB
ANION GAP SERPL CALCULATED.3IONS-SCNC: 9 MMOL/L (ref 4–13)
BASOPHILS # BLD AUTO: 0.01 THOUSANDS/ΜL (ref 0–0.1)
BASOPHILS NFR BLD AUTO: 0 % (ref 0–1)
BUN SERPL-MCNC: 26 MG/DL (ref 5–25)
CALCIUM SERPL-MCNC: 8.6 MG/DL (ref 8.3–10.1)
CHLORIDE SERPL-SCNC: 104 MMOL/L (ref 100–108)
CO2 SERPL-SCNC: 26 MMOL/L (ref 21–32)
CREAT SERPL-MCNC: 1 MG/DL (ref 0.6–1.3)
EOSINOPHIL # BLD AUTO: 0 THOUSAND/ΜL (ref 0–0.61)
EOSINOPHIL NFR BLD AUTO: 0 % (ref 0–6)
ERYTHROCYTE [DISTWIDTH] IN BLOOD BY AUTOMATED COUNT: 13.1 % (ref 11.6–15.1)
GFR SERPL CREATININE-BSD FRML MDRD: 74 ML/MIN/1.73SQ M
GLUCOSE SERPL-MCNC: 134 MG/DL (ref 65–140)
HCT VFR BLD AUTO: 39 % (ref 36.5–49.3)
HGB BLD-MCNC: 13.1 G/DL (ref 12–17)
IMM GRANULOCYTES # BLD AUTO: 0.08 THOUSAND/UL (ref 0–0.2)
IMM GRANULOCYTES NFR BLD AUTO: 1 % (ref 0–2)
LYMPHOCYTES # BLD AUTO: 0.61 THOUSANDS/ΜL (ref 0.6–4.47)
LYMPHOCYTES NFR BLD AUTO: 8 % (ref 14–44)
MAGNESIUM SERPL-MCNC: 2 MG/DL (ref 1.6–2.6)
MCH RBC QN AUTO: 32 PG (ref 26.8–34.3)
MCHC RBC AUTO-ENTMCNC: 33.6 G/DL (ref 31.4–37.4)
MCV RBC AUTO: 95 FL (ref 82–98)
MONOCYTES # BLD AUTO: 0.48 THOUSAND/ΜL (ref 0.17–1.22)
MONOCYTES NFR BLD AUTO: 6 % (ref 4–12)
NEUTROPHILS # BLD AUTO: 6.84 THOUSANDS/ΜL (ref 1.85–7.62)
NEUTS SEG NFR BLD AUTO: 85 % (ref 43–75)
NRBC BLD AUTO-RTO: 0 /100 WBCS
PHOSPHATE SERPL-MCNC: 3 MG/DL (ref 2.3–4.1)
PLATELET # BLD AUTO: 180 THOUSANDS/UL (ref 149–390)
PMV BLD AUTO: 9.1 FL (ref 8.9–12.7)
POTASSIUM SERPL-SCNC: 4.1 MMOL/L (ref 3.5–5.3)
RBC # BLD AUTO: 4.09 MILLION/UL (ref 3.88–5.62)
SODIUM SERPL-SCNC: 139 MMOL/L (ref 136–145)
WBC # BLD AUTO: 8.02 THOUSAND/UL (ref 4.31–10.16)

## 2018-10-15 PROCEDURE — 94760 N-INVAS EAR/PLS OXIMETRY 1: CPT

## 2018-10-15 PROCEDURE — 99233 SBSQ HOSP IP/OBS HIGH 50: CPT | Performed by: FAMILY MEDICINE

## 2018-10-15 PROCEDURE — 99232 SBSQ HOSP IP/OBS MODERATE 35: CPT | Performed by: INTERNAL MEDICINE

## 2018-10-15 PROCEDURE — 85025 COMPLETE CBC W/AUTO DIFF WBC: CPT | Performed by: STUDENT IN AN ORGANIZED HEALTH CARE EDUCATION/TRAINING PROGRAM

## 2018-10-15 PROCEDURE — 84100 ASSAY OF PHOSPHORUS: CPT | Performed by: STUDENT IN AN ORGANIZED HEALTH CARE EDUCATION/TRAINING PROGRAM

## 2018-10-15 PROCEDURE — 83735 ASSAY OF MAGNESIUM: CPT | Performed by: STUDENT IN AN ORGANIZED HEALTH CARE EDUCATION/TRAINING PROGRAM

## 2018-10-15 PROCEDURE — 97110 THERAPEUTIC EXERCISES: CPT

## 2018-10-15 PROCEDURE — 80048 BASIC METABOLIC PNL TOTAL CA: CPT | Performed by: STUDENT IN AN ORGANIZED HEALTH CARE EDUCATION/TRAINING PROGRAM

## 2018-10-15 PROCEDURE — 94640 AIRWAY INHALATION TREATMENT: CPT

## 2018-10-15 PROCEDURE — 82272 OCCULT BLD FECES 1-3 TESTS: CPT | Performed by: STUDENT IN AN ORGANIZED HEALTH CARE EDUCATION/TRAINING PROGRAM

## 2018-10-15 RX ORDER — FAMOTIDINE 20 MG/1
40 TABLET, FILM COATED ORAL
Status: DISCONTINUED | OUTPATIENT
Start: 2018-10-15 | End: 2018-10-17 | Stop reason: HOSPADM

## 2018-10-15 RX ORDER — METHYLPREDNISOLONE SODIUM SUCCINATE 40 MG/ML
20 INJECTION, POWDER, LYOPHILIZED, FOR SOLUTION INTRAMUSCULAR; INTRAVENOUS EVERY 8 HOURS SCHEDULED
Status: DISCONTINUED | OUTPATIENT
Start: 2018-10-15 | End: 2018-10-17 | Stop reason: HOSPADM

## 2018-10-15 RX ADMIN — SUCRALFATE 1000 MG: 1 SUSPENSION ORAL at 17:45

## 2018-10-15 RX ADMIN — IPRATROPIUM BROMIDE AND ALBUTEROL SULFATE 3 ML: .5; 3 SOLUTION RESPIRATORY (INHALATION) at 07:48

## 2018-10-15 RX ADMIN — METHYLPREDNISOLONE SODIUM SUCCINATE 40 MG: 40 INJECTION, POWDER, FOR SOLUTION INTRAMUSCULAR; INTRAVENOUS at 09:10

## 2018-10-15 RX ADMIN — METHYLPREDNISOLONE SODIUM SUCCINATE 20 MG: 40 INJECTION, POWDER, FOR SOLUTION INTRAMUSCULAR; INTRAVENOUS at 14:50

## 2018-10-15 RX ADMIN — IPRATROPIUM BROMIDE AND ALBUTEROL SULFATE 3 ML: .5; 3 SOLUTION RESPIRATORY (INHALATION) at 11:22

## 2018-10-15 RX ADMIN — IPRATROPIUM BROMIDE AND ALBUTEROL SULFATE 3 ML: .5; 3 SOLUTION RESPIRATORY (INHALATION) at 21:34

## 2018-10-15 RX ADMIN — FLUTICASONE FUROATE AND VILANTEROL TRIFENATATE 1 PUFF: 200; 25 POWDER RESPIRATORY (INHALATION) at 09:10

## 2018-10-15 RX ADMIN — PANTOPRAZOLE SODIUM 40 MG: 40 TABLET, DELAYED RELEASE ORAL at 17:45

## 2018-10-15 RX ADMIN — AMLODIPINE BESYLATE 10 MG: 10 TABLET ORAL at 09:10

## 2018-10-15 RX ADMIN — PANTOPRAZOLE SODIUM 40 MG: 40 TABLET, DELAYED RELEASE ORAL at 09:10

## 2018-10-15 RX ADMIN — FAMOTIDINE 40 MG: 20 TABLET, FILM COATED ORAL at 22:18

## 2018-10-15 RX ADMIN — SUCRALFATE 1000 MG: 1 SUSPENSION ORAL at 06:45

## 2018-10-15 RX ADMIN — METHYLPREDNISOLONE SODIUM SUCCINATE 20 MG: 40 INJECTION, POWDER, FOR SOLUTION INTRAMUSCULAR; INTRAVENOUS at 22:18

## 2018-10-15 RX ADMIN — SUCRALFATE 1000 MG: 1 SUSPENSION ORAL at 11:09

## 2018-10-15 RX ADMIN — TAMSULOSIN HYDROCHLORIDE 0.4 MG: 0.4 CAPSULE ORAL at 09:10

## 2018-10-15 RX ADMIN — ENOXAPARIN SODIUM 40 MG: 40 INJECTION SUBCUTANEOUS at 09:10

## 2018-10-15 RX ADMIN — SUCRALFATE 1000 MG: 1 SUSPENSION ORAL at 22:18

## 2018-10-15 RX ADMIN — IPRATROPIUM BROMIDE AND ALBUTEROL SULFATE 3 ML: .5; 3 SOLUTION RESPIRATORY (INHALATION) at 15:25

## 2018-10-15 NOTE — PHYSICAL THERAPY NOTE
PT EVALUATION     10/15/18 1545   Pain Assessment   Pain Assessment No/denies pain   Restrictions/Precautions   Other Precautions Fall Risk;O2  (SOB)   General   Chart Reviewed Yes   Family/Caregiver Present No   Cognition   Arousal/Participation Cooperative   Subjective   Subjective patient reports continued SOB limiting activity endurance   Bed Mobility   Supine to Sit 7  Independent   Transfers   Sit to Stand 4  Minimal assistance   Additional items Assist x 1   Stand to Sit 4  Minimal assistance   Additional items Assist x 1   Ambulation/Elevation   Gait Assistance 4  Minimal assist   Additional items Assist x 1   Assistive Device Rolling walker   Distance 20 feet with change in direction and pursed lip breathing    Exercises   Quad Sets Sitting;10 reps;Bilateral   Hip Flexion Sitting;10 reps;Bilateral   Knee AROM Long Arc Quad Sitting;10 reps;Bilateral   Ankle Pumps Sitting;10 reps;Bilateral   Balance training  sitting and standing weight shifting activity completed for strengthening and balance    Assessment   Assessment Patient cooperative but fatigued with limited activity due to SOB  Patient will benefit from continued PT with increasing mobility as tolerated  Plan   Treatment/Interventions ADL retraining;Functional transfer training;LE strengthening/ROM; Elevations; Therapeutic exercise; Endurance training;Cognitive reorientation;Equipment eval/education; Bed mobility;Gait training; Compensatory technique education   PT Frequency 5x/wk   Recommendation   Recommendation Home with family support  (pulmonary rehab)   Licensure   [place] License Number  Chirag Torri PT [id number]

## 2018-10-15 NOTE — PROGRESS NOTES
[facility] Practice Progress Note - Eric Frank 75 y o  male MRN: [id number]    Unit/Bed#: 2 [place] [address] Encounter: [id number]      Assessment/Plan:  * Chronic respiratory failure with hypoxia, on home O2 therapy [facility])   Assessment & Plan    On admission, patient states he has had worsening SOB especially on exertion for 3 days  On 3L O2 at home  In ED received Prednisone 60 mg PO, Duo-neb 3 ml x2, 1 L NS   -10/11 CXR: Left basilar streaky density possibly subsegmental atelectasis  -10/11 CT: No evidence PE, Several small pulmonary nodules   New left upper lobe pulmonary nodule  F/U CT scan in 6 mo  -Urine strep/legionella negative  -Continue supplemental O2  -Pulmonology consulted, appreciate recommendations   -Solumedrol 40mg q12h tapered to 20mg q8h    -Continue Breo   -Continue Duonebs q4h scheduled and q2h PRN   -For lung nodules, repeat CT chest outpatient in 6 months     Gastroesophageal reflux disease without esophagitis   Assessment & Plan    Patientt reports Nausea and mild epigastric discomfort  Recently started on Sucralfate 10 ml QiD w/out relief  On Prilosec at home   States he is due for EGD in January, last EGD was 3/10/17 and it was normal   -Continue sucralfate 1g QID  -Zofran PRN nausea   -GI consulted, recommendations appreciated   -Protonix increased to 40mg BID    -Pepcid 40mg QHS added   -RUQ U/S ordered to rule out gallstones    -EGD may be done outpatient      Lung nodules   Assessment & Plan    10/11/18 CT: Several small pulmonary nodules are again visualized   Right upper lobe nodule measuring 8 mm is unchanged   4 mm right lower lobe nodule is noted   2 mm left lower lobe nodule is unchanged   Several adjacent   triangular-shaped right lower lobe   Visual nodules are again visualized  Fabian Annetta is seen in the right lower lobe  Cristopher Revels is a new left upper lobe pulmonary nodule measuring 5 mm  -Continue O/p follow up with Dr Long   -Repeat CT in 6 months outpatient     Weakness generalized   Assessment & Plan    On arrival, patient admitted to have decreased appetite over a month, feels so weak he can barely get out of bed   -Monitor electrolytes and vitals through hospital stay, replete as appropriate  -PT/OT consulted, current recommendation to return home with family support/possibly services, will continue to follow      HTN (hypertension)   Assessment & Plan    -Continue home medication Amlodipine 10 mg qd     BPH (benign prostatic hyperplasia)   Assessment & Plan    Stable, reports no changes in urination   -Continue home medication Flomax 0 4 mg qd     AAT (alpha-1-antitrypsin) deficiency ([facility] [address] )   Assessment & Plan    -Continue Zemaira Weekly outpatient         Subjective:   Patient seen and examined at bedside this morning  No acute overnight events reported  Denies any new complaints  No chest pain, no shortness of breath at rest but does feel it with exertion  Objective:     Vitals: Blood pressure 167/77, pulse 81, temperature 98 1 °F (36 7 °C), temperature source Oral, resp  rate 18, height 6' 5" (1 956 m), weight 78 4 kg (172 lb 13 5 oz), SpO2 97 %  ,Body mass index is 20 5 kg/m²    Wt Readings from Last 3 Encounters:   10/11/18 78 4 kg (172 lb 13 5 oz)   10/02/18 82 6 kg (182 lb)   07/06/18 82 6 kg (182 lb)       Intake/Output Summary (Last 24 hours) at 10/15/18 1047  Last data filed at 10/15/18 0630   Gross per 24 hour   Intake                0 ml   Output              800 ml   Net             -800 ml       Physical Exam: General appearance: alert and oriented, in no acute distress  Lungs: diminished breath sounds  Heart: regular rate and rhythm, S1, S2 normal, no murmur, click, rub or gallop  Abdomen: firm, nontender, non-distended, normal bowel sounds   Extremities: extremities normal, warm and well-perfused; no cyanosis, clubbing, or edema     Recent Results (from the past 24 hour(s))   CBC and differential    Collection Time: 10/15/18  6:37 AM   Result Value Ref Range WBC 8 02 4 31 - 10 16 Thousand/uL    RBC 4 09 3 88 - 5 62 Million/uL    Hemoglobin 13 1 12 0 - 17 0 g/dL    Hematocrit 39 0 36 5 - 49 3 %    MCV 95 82 - 98 fL    MCH 32 0 26 8 - 34 3 pg    MCHC 33 6 31 4 - 37 4 g/dL    RDW 13 1 11 6 - 15 1 %    MPV 9 1 8 9 - 12 7 fL    Platelets 097 705 - 457 Thousands/uL    nRBC 0 /100 WBCs    Neutrophils Relative 85 (H) 43 - 75 %    Immat GRANS % 1 0 - 2 %    Lymphocytes Relative 8 (L) 14 - 44 %    Monocytes Relative 6 4 - 12 %    Eosinophils Relative 0 0 - 6 %    Basophils Relative 0 0 - 1 %    Neutrophils Absolute 6 84 1 85 - 7 62 Thousands/µL    Immature Grans Absolute 0 08 0 00 - 0 20 Thousand/uL    Lymphocytes Absolute 0 61 0 60 - 4 47 Thousands/µL    Monocytes Absolute 0 48 0 17 - 1 22 Thousand/µL    Eosinophils Absolute 0 00 0 00 - 0 61 Thousand/µL    Basophils Absolute 0 01 0 00 - 0 10 Thousands/µL   Basic metabolic panel    Collection Time: 10/15/18  6:37 AM   Result Value Ref Range    Sodium 139 136 - 145 mmol/L    Potassium 4 1 3 5 - 5 3 mmol/L    Chloride 104 100 - 108 mmol/L    CO2 26 21 - 32 mmol/L    ANION GAP 9 4 - 13 mmol/L    BUN 26 (H) 5 - 25 mg/dL    Creatinine 1 00 0 60 - 1 30 mg/dL    Glucose 134 65 - 140 mg/dL    Calcium 8 6 8 3 - 10 1 mg/dL    eGFR 74 ml/min/1 73sq m   Magnesium    Collection Time: 10/15/18  6:37 AM   Result Value Ref Range    Magnesium 2 0 1 6 - 2 6 mg/dL   Phosphorus    Collection Time: 10/15/18  6:37 AM   Result Value Ref Range    Phosphorus 3 0 2 3 - 4 1 mg/dL       Current Facility-Administered Medications   Medication Dose Route Frequency Provider Last Rate Last Dose    amLODIPine (NORVASC) tablet 10 mg  10 mg Oral Daily Edgardo Flowers MD   10 mg at 10/15/18 0910    enoxaparin (LOVENOX) subcutaneous injection 40 mg  40 mg Subcutaneous Daily Edgardo Flowers MD   40 mg at 10/15/18 0910    famotidine (PEPCID) tablet 40 mg  40 mg Oral HS Carmell Cunas, PA-C        fluticasone-vilanterol (BREO ELLIPTA) 200-25 MCG/INH inhaler 1 puff 1 puff Inhalation Daily Vern Borges MD   1 puff at 10/15/18 0910    ipratropium-albuterol (DUO-NEB) 0 5-2 5 mg/3 mL inhalation solution 3 mL  3 mL Nebulization Q4H Vern Borges MD   3 mL at 10/15/18 0748    ipratropium-albuterol (DUO-NEB) 0 5-2 5 mg/3 mL inhalation solution 3 mL  3 mL Nebulization Q2H PRN Vern Borges MD        methylPREDNISolone sodium succinate (Solu-MEDROL) injection 20 mg  20 mg Intravenous Q8H [address] Gina Choudhary DO        ondansetron (ZOFRAN) injection 4 mg  4 mg Intravenous Q6H PRN Vern Borges MD        pantoprazole (PROTONIX) EC tablet 40 mg  40 mg Oral BID Veronica Rosas PA-C   40 mg at 10/15/18 0910    simethicone (MYLICON) chewable tablet 80 mg  80 mg Oral 4x Daily PRN Vern Borges MD   80 mg at 10/12/18 2028    sucralfate (CARAFATE) oral suspension 1,000 mg  1,000 mg Oral 4x Daily (AC & HS) Vern Borges MD   1,000 mg at 10/15/18 0645    tamsulosin (FLOMAX) capsule 0 4 mg  0 4 mg Oral Daily Vern Borges MD   0 4 mg at 10/15/18 0910    zolpidem (AMBIEN) tablet 10 mg  10 mg Oral HS PRN Vern Borges MD   10 mg at 10/14/18 2155       Invasive Devices     Peripheral Intravenous Line            Peripheral IV 10/11/18 Right Antecubital 3 days                Lab, Imaging and other studies: I have personally reviewed pertinent reports      VTE Pharmacologic Prophylaxis: Enoxaparin (Lovenox)  VTE Mechanical Prophylaxis: sequential compression device    Jeny Vásquez DO

## 2018-10-15 NOTE — PROGRESS NOTES
Progress Note - Pulmonary   Topsham Ernie 75 y o  male MRN: [id number]  Unit/Bed#: 1055 [facility] Encounter: [id number]      Assessment/Plan:     1  Acute COPD Exacerbation with chronic respiratory failure with hypoxia: Last FEV2 was 40% predicted  Pt continued on home 3L NC O2, saturating well between 94-98%  Will continue with duo-neb qid and Breo 1 puff daily  Will taper solumedrol to 20mg q8h  Home advair, incruse ellipta held during hospital admission - plan to resume on discharge    2  Pulmonary lung nodules:  As seen on CT 10/11/18: Several small pulmonary nodules are again visualized   Right upper lobe nodule measuring 8 mm is unchanged   4 mm right lower lobe nodule is noted   2 mm left lower lobe nodule is unchanged   Several adjacent triangular-shaped right lower lobe   Visual nodules are again visualized  Tanya Monroe is seen in the right lower lobe  Maxim Mccurdy is a new left upper lobe pulmonary nodule measuring 5 mm   -Repeat outpatient CT Chest in 6 months    3  Alpha 1-antitrypsin deficiency (PiSZ)  Pt on weekly zemaira  Last infusion was on 10/9, next scheduled infusion on 10/18 outpt    ______________________________________________________________________    Subjective: Pt seen and examined at bedside  No acute events noted overnight  Pt reports he feels back to his baseline  He does endorse he becomes short of breath quickly with minimal exertion  Vitals:   Temp:  [97 9 °F (36 6 °C)-98 4 °F (36 9 °C)] 98 1 °F (36 7 °C)  HR:  [80-81] 81  Resp:  [18-19] 18  BP: (145-167)/(76-79) 167/77  Weight (last 2 days)     None        Oxygen Therapy  SpO2: 97 %  O2 Flow Rate (L/min): 3 L/min             Nutrition:        Diet Orders            Start     Ordered    10/12/18 0838  Room Service  Once     Question:  Type of Service  Answer:  Room Service-Appropriate    10/12/18 8159    10/11/18 4205  Diet Regular; Regular House  Diet effective now     Question Answer Comment   Diet Type Regular    Regular Regular House RD to adjust diet per protocol? Yes        10/11/18 9186          Ins/Outs:   I/O       10/13 0701 - 10/14 0700 10/14 0701 - 10/15 0700 10/15 0701 - 10/16 0700    Urine (mL/kg/hr) 1175 (0 6) 800 (0 4)     Total Output 1175 800      Net -1175 -800                   Lines/Drains:  Invasive Devices     Peripheral Intravenous Line            Peripheral IV 10/11/18 Right Antecubital 3 days                 Active medications:  Scheduled Meds:  Current Facility-Administered Medications:  amLODIPine 10 mg Oral Daily Taylor Darby MD   enoxaparin 40 mg Subcutaneous Daily Taylor Darby MD   famotidine 40 mg Oral HS Reny Grullon PA-C   fluticasone-vilanterol 1 puff Inhalation Daily Taylor Darby MD   ipratropium-albuterol 3 mL Nebulization Q4H Taylor Darby MD   ipratropium-albuterol 3 mL Nebulization Q2H PRN Taylor Darby MD   methylPREDNISolone sodium succinate 40 mg Intravenous Q12H [address] Everett Ruiz MD   ondansetron 4 mg Intravenous Q6H PRN Taylor Darby MD   pantoprazole 40 mg Oral BID Reny Grullon PA-C   simethicone 80 mg Oral 4x Daily PRN Taylor Darby MD   sucralfate 1,000 mg Oral 4x Daily (AC & HS) Taylor Darby MD   tamsulosin 0 4 mg Oral Daily Taylor Darby MD   zolpidem 10 mg Oral HS PRN Taylor Darby MD     PRN Meds:  ipratropium-albuterol 3 mL Q2H PRN   ondansetron 4 mg Q6H PRN   simethicone 80 mg 4x Daily PRN   zolpidem 10 mg HS PRN     ____________________________________________________________________      Physical Exam   Constitutional: He is oriented to person, place, and time  He appears well-developed and well-nourished  HENT:   Head: Normocephalic and atraumatic  Cardiovascular: Normal rate, regular rhythm and normal heart sounds  Pulmonary/Chest: Effort normal  He has no wheezes  On 3L NC O2  Diminished air exchange, otherwise clear   Abdominal: Soft  Neurological: He is alert and oriented to person, place, and time  Psychiatric: He has a normal mood and affect  Nursing note and vitals reviewed  ____________________________________________________________________    Labs:   CBC:   Results from last 7 days  Lab Units 10/15/18  0637 10/14/18  0609 10/13/18  0456   WBC Thousand/uL 8 02 9 13 11 20*   HEMOGLOBIN g/dL 13 1 13 2 14 4   HEMATOCRIT % 39 0 39 3 42 3   MCV fL 95 95 95   PLATELETS Thousands/uL 180 197 244     CMP:   Results from last 7 days  Lab Units 10/15/18  0637 10/14/18  0609 10/13/18  0456  10/11/18  1839   SODIUM mmol/L 139 139 137  < > 141   POTASSIUM mmol/L 4 1 4 0 4 2  < > 3 3*   CHLORIDE mmol/L 104 104 103  < > 102   CO2 mmol/L 26 24 22  < > 26   BUN mg/dL 26* 26* 24  < > 18   CREATININE mg/dL 1 00 0 98 1 25  < > 1 24   CALCIUM mg/dL 8 6 8 7 9 3  < > 9 6   AST U/L  --   --   --   --  19   ALT U/L  --   --   --   --  36   ALK PHOS U/L  --   --   --   --  71   EGFR ml/min/1 73sq m 74 76 56  < > 57   < > = values in this interval not displayed  Magnesium:   Results from last 7 days  Lab Units 10/15/18  0637   MAGNESIUM mg/dL 2 0     Phosphorous:   Results from last 7 days  Lab Units 10/15/18  0637   PHOSPHORUS mg/dL 3 0     Troponin:   Results from last 7 days  Lab Units 10/11/18  1839   TROPONIN I ng/mL <0 02     PT/INR:   Results from last 7 days  Lab Units 10/11/18  1839   PTT seconds 27   INR  1 03     Procalcitonin: Invalid input(s): PROCALCITONIN      Imaging:   Xr Chest 1 View Portable   Result Date: 10/11/2018  Impression: Left basilar streaky density possibly subsegmental atelectasis  Workstation performed: [id number]     Cta Chest Ct Abdomen Pelvis W Contrast  Result Date: 10/11/2018  Impression: No evidence of pulmonary embolus Several small pulmonary nodules  New left upper lobe pulmonary nodule  Follow-up CT scan in 3-6 months is recommended  The study was marked in EPIC for significant notification  Workstation performed: [id number]         Micro: Lab Results   Component Value Date    BLOODCX No Growth After 5 Days   12/29/2017 BLOODCX No Growth After 5 Days  12/29/2017    MRSACULTURE (A) 10/11/2018     Methicillin Resistant Staphylococcus aureus isolated    MRSACULTURE  10/11/2018     Please note: This patient requires contact precautions              Invalid input(s): LEGIONELLAURINARYANTIGEN        Code Status: Level 1 - Full Code

## 2018-10-15 NOTE — PROGRESS NOTES
Progress Note- Toño Strickland 75 y o  male MRN: [id number]    Unit/Bed#: 2 [place] [address] Encounter: [id number]      Assessment and Plan:    GERD  Epigastric pain  -Patient with chronic GI upset, history of PUD, reports worsening of symptoms over the last few weeks  He is currently admitted with a COPD exacerbation  He has a history of alpha 1 antitrypsin deficiency  - PPI BID  -continue carafate  -Will add [id number] at bedtime given his GERD symptoms are present at night  -feel that patient would benefit from EGD to rule out PUD, gastritis, however would prefer this to be done when his respiratory status has improved  This could be completed as an outpatient  -zofran PRN  -Check RUQ U/S to rule out gallstones    ______________________________________________________________________    Subjective:     Camilo reports improvement in his GERD symptoms as his PPI was increased to BID, he does have symptoms overnight  He has not had vomiting  He is having bowel movements   He still has dyspnea when ambulating    Medication Administration - last 24 hours from 10/14/2018 0938 to 10/15/2018 6079       Date/Time Order Dose Route Action Action by     10/15/2018 0910 fluticasone-vilanterol (BREO ELLIPTA) 200-25 MCG/INH inhaler 1 puff 1 puff Inhalation Given Shad Braswell RN     10/14/2018 1018 fluticasone-vilanterol (BREO ELLIPTA) 200-25 MCG/INH inhaler 1 puff 1 puff Inhalation Given Shad Braswell RN     10/15/2018 0645 sucralfate (CARAFATE) oral suspension 1,000 mg 1,000 mg Oral Given Birdie Harvey RN     10/14/2018 2148 sucralfate (CARAFATE) oral suspension 1,000 mg 1,000 mg Oral Given Birdie Candice, EMY     10/14/2018 1710 sucralfate (CARAFATE) oral suspension 1,000 mg 1,000 mg Oral Given Shad Braswell RN     10/14/2018 1030 sucralfate (CARAFATE) oral suspension 1,000 mg 1,000 mg Oral Given Shad Braswell RN     10/15/2018 0910 tamsulosin (FLOMAX) capsule 0 4 mg 0 4 mg Oral Given Shad Braswell RN     10/14/2018 1018 tamsulosin (FLOMAX) capsule 0 4 mg 0 4 mg Oral Given [facility], RN     10/15/2018 0910 amLODIPine (NORVASC) tablet 10 mg 10 mg Oral Given Autumn Poche, RN     10/14/2018 1018 amLODIPine (NORVASC) tablet 10 mg 10 mg Oral Given [facility], RN     10/15/2018 0910 enoxaparin (LOVENOX) subcutaneous injection 40 mg 40 mg Subcutaneous Given [facility], RN     10/14/2018 1018 enoxaparin (LOVENOX) subcutaneous injection 40 mg 40 mg Subcutaneous Given [facility], RN     10/15/2018 0748 ipratropium-albuterol (DUO-NEB) 0 5-2 5 mg/3 mL inhalation solution 3 mL 3 mL Nebulization Given Mike Santiago, RT     10/15/2018 0416 ipratropium-albuterol (DUO-NEB) 0 5-2 5 mg/3 mL inhalation solution 3 mL 3 mL Nebulization Not Given King Justus, RT     10/14/2018 2321 ipratropium-albuterol (DUO-NEB) 0 5-2 5 mg/3 mL inhalation solution 3 mL 3 mL Nebulization Given King Justus, RT     10/14/2018 1948 ipratropium-albuterol (DUO-NEB) 0 5-2 5 mg/3 mL inhalation solution 3 mL 3 mL Nebulization Given King Justus, RT     10/14/2018 1507 ipratropium-albuterol (DUO-NEB) 0 5-2 5 mg/3 mL inhalation solution 3 mL 3 mL Nebulization Given Sondra Alvin, RT     10/14/2018 1103 ipratropium-albuterol (DUO-NEB) 0 5-2 5 mg/3 mL inhalation solution 3 mL 3 mL Nebulization Given Adah Childitz, RT     10/14/2018 2155 zolpidem (AMBIEN) tablet 10 mg 10 mg Oral Given Birdie Harvey, RN     10/15/2018 0910 pantoprazole (PROTONIX) EC tablet 40 mg 40 mg Oral Given [facility], RN     10/14/2018 1711 pantoprazole (PROTONIX) EC tablet 40 mg 40 mg Oral Given [facility], RN     10/14/2018 1018 pantoprazole (PROTONIX) EC tablet 40 mg 40 mg Oral Given Julieta Judd RN     10/15/2018 0709 methylPREDNISolone sodium succinate (Solu-MEDROL) injection 40 mg 40 mg Intravenous Given Julieta Judd RN     10/14/2018 2149 methylPREDNISolone sodium succinate (Solu-MEDROL) injection 40 mg 40 mg Intravenous Given Warren Renee RN Objective:     Vitals: Blood pressure 167/77, pulse 81, temperature 98 1 °F (36 7 °C), temperature source Oral, resp  rate 18, height 6' 5" (1 956 m), weight 78 4 kg (172 lb 13 5 oz), SpO2 97 %  ,Body mass index is 20 5 kg/m²        Intake/Output Summary (Last 24 hours) at 10/15/18 0938  Last data filed at 10/15/18 0630   Gross per 24 hour   Intake                0 ml   Output              800 ml   Net             -800 ml       Physical Exam:   General Appearance: Awake and alert, in no acute distress  Abdomen: firm, non-tender, non-distended; bowel sounds normal  Lungs: decreased breath sounds, O2 NC    Invasive Devices     Peripheral Intravenous Line            Peripheral IV 10/11/18 Right Antecubital 3 days                Lab Results:  Admission on 10/11/2018   Component Date Value    WBC 10/11/2018 7 38     RBC 10/11/2018 4 34     Hemoglobin 10/11/2018 13 9     Hematocrit 10/11/2018 40 5     MCV 10/11/2018 93     MCH 10/11/2018 32 0     MCHC 10/11/2018 34 3     RDW 10/11/2018 13 1     MPV 10/11/2018 8 8*    Platelets 14/20/8983 199     nRBC 10/11/2018 0     Neutrophils Relative 10/11/2018 66     Immat GRANS % 10/11/2018 0     Lymphocytes Relative 10/11/2018 22     Monocytes Relative 10/11/2018 8     Eosinophils Relative 10/11/2018 3     Basophils Relative 10/11/2018 1     Neutrophils Absolute 10/11/2018 4 87     Immature Grans Absolute 10/11/2018 0 03     Lymphocytes Absolute 10/11/2018 1 62     Monocytes Absolute 10/11/2018 0 58     Eosinophils Absolute 10/11/2018 0 21     Basophils Absolute 10/11/2018 0 07     Protime 10/11/2018 10 8     INR 10/11/2018 1 03     PTT 10/11/2018 27     Sodium 10/11/2018 141     Potassium 10/11/2018 3 3*    Chloride 10/11/2018 102     CO2 10/11/2018 26     ANION GAP 10/11/2018 13     BUN 10/11/2018 18     Creatinine 10/11/2018 1 24     Glucose 10/11/2018 128     Calcium 10/11/2018 9 6     AST 10/11/2018 19     ALT 10/11/2018 36     Alkaline Phosphatase 10/11/2018 71     Total Protein 10/11/2018 7 5     Albumin 10/11/2018 4 2     Total Bilirubin 10/11/2018 1 50*    eGFR 10/11/2018 57     Troponin I 10/11/2018 <0 02     MRSA Culture Only 10/11/2018 Methicillin Resistant Staphylococcus aureus isolated*    MRSA Culture Only 10/11/2018 Please note: This patient requires contact precautions       Strep pneumoniae antigen* 10/12/2018 Negative     Legionella Urinary Antig* 10/12/2018 Negative     Sodium 10/12/2018 137     Potassium 10/12/2018 4 3     Chloride 10/12/2018 104     CO2 10/12/2018 23     ANION GAP 10/12/2018 10     BUN 10/12/2018 16     Creatinine 10/12/2018 1 11     Glucose 10/12/2018 160*    Calcium 10/12/2018 8 9     eGFR 10/12/2018 65     Magnesium 10/12/2018 1 9     Phosphorus 10/12/2018 1 9*    WBC 10/12/2018 6 38     RBC 10/12/2018 4 52     Hemoglobin 10/12/2018 14 1     Hematocrit 10/12/2018 42 3     MCV 10/12/2018 94     MCH 10/12/2018 31 2     MCHC 10/12/2018 33 3     RDW 10/12/2018 12 9     MPV 10/12/2018 9 0     Platelets 58/71/4049 188     nRBC 10/12/2018 0     Neutrophils Relative 10/12/2018 86*    Immat GRANS % 10/12/2018 1     Lymphocytes Relative 10/12/2018 11*    Monocytes Relative 10/12/2018 2*    Eosinophils Relative 10/12/2018 0     Basophils Relative 10/12/2018 0     Neutrophils Absolute 10/12/2018 5 55     Immature Grans Absolute 10/12/2018 0 04     Lymphocytes Absolute 10/12/2018 0 67     Monocytes Absolute 10/12/2018 0 11*    Eosinophils Absolute 10/12/2018 0 00     Basophils Absolute 10/12/2018 0 01     WBC 10/13/2018 11 20*    RBC 10/13/2018 4 46     Hemoglobin 10/13/2018 14 4     Hematocrit 10/13/2018 42 3     MCV 10/13/2018 95     MCH 10/13/2018 32 3     MCHC 10/13/2018 34 0     RDW 10/13/2018 13 1     MPV 10/13/2018 9 0     Platelets 96/91/3686 244     nRBC 10/13/2018 0     Neutrophils Relative 10/13/2018 90*    Immat GRANS % 10/13/2018 1     Lymphocytes Relative 10/13/2018 7*    Monocytes Relative 10/13/2018 2*    Eosinophils Relative 10/13/2018 0     Basophils Relative 10/13/2018 0     Neutrophils Absolute 10/13/2018 10 14*    Immature Grans Absolute 10/13/2018 0 07     Lymphocytes Absolute 10/13/2018 0 76     Monocytes Absolute 10/13/2018 0 21     Eosinophils Absolute 10/13/2018 0 00     Basophils Absolute 10/13/2018 0 02     Sodium 10/13/2018 137     Potassium 10/13/2018 4 2     Chloride 10/13/2018 103     CO2 10/13/2018 22     ANION GAP 10/13/2018 12     BUN 10/13/2018 24     Creatinine 10/13/2018 1 25     Glucose 10/13/2018 141*    Calcium 10/13/2018 9 3     eGFR 10/13/2018 56     Magnesium 10/13/2018 2 0     Phosphorus 10/13/2018 3 4     WBC 10/14/2018 9 13     RBC 10/14/2018 4 13     Hemoglobin 10/14/2018 13 2     Hematocrit 10/14/2018 39 3     MCV 10/14/2018 95     MCH 10/14/2018 32 0     MCHC 10/14/2018 33 6     RDW 10/14/2018 13 1     Platelets 90/08/6849 197     MPV 10/14/2018 9 1     Sodium 10/14/2018 139     Potassium 10/14/2018 4 0     Chloride 10/14/2018 104     CO2 10/14/2018 24     ANION GAP 10/14/2018 11     BUN 10/14/2018 26*    Creatinine 10/14/2018 0 98     Glucose 10/14/2018 138     Calcium 10/14/2018 8 7     eGFR 10/14/2018 76     Magnesium 10/14/2018 2 0     Phosphorus 10/14/2018 2 8     WBC 10/15/2018 8 02     RBC 10/15/2018 4 09     Hemoglobin 10/15/2018 13 1     Hematocrit 10/15/2018 39 0     MCV 10/15/2018 95     MCH 10/15/2018 32 0     MCHC 10/15/2018 33 6     RDW 10/15/2018 13 1     MPV 10/15/2018 9 1     Platelets 17/71/9741 180     nRBC 10/15/2018 0     Neutrophils Relative 10/15/2018 85*    Immat GRANS % 10/15/2018 1     Lymphocytes Relative 10/15/2018 8*    Monocytes Relative 10/15/2018 6     Eosinophils Relative 10/15/2018 0     Basophils Relative 10/15/2018 0     Neutrophils Absolute 10/15/2018 6 84     Immature Grans Absolute 10/15/2018 0 08     Lymphocytes Absolute 10/15/2018 0 61     Monocytes Absolute 10/15/2018 0 48     Eosinophils Absolute 10/15/2018 0 00     Basophils Absolute 10/15/2018 0 01     Sodium 10/15/2018 139     Potassium 10/15/2018 4 1     Chloride 10/15/2018 104     CO2 10/15/2018 26     ANION GAP 10/15/2018 9     BUN 10/15/2018 26*    Creatinine 10/15/2018 1 00     Glucose 10/15/2018 134     Calcium 10/15/2018 8 6     eGFR 10/15/2018 74     Magnesium 10/15/2018 2 0     Phosphorus 10/15/2018 3 0        Imaging Studies: I have personally reviewed pertinent imaging studies

## 2018-10-16 ENCOUNTER — APPOINTMENT (INPATIENT)
Dept: RADIOLOGY | Facility: HOSPITAL | Age: 73
DRG: 191 | End: 2018-10-16
Payer: MEDICARE

## 2018-10-16 LAB
ANION GAP SERPL CALCULATED.3IONS-SCNC: 10 MMOL/L (ref 4–13)
ATRIAL RATE: 95 BPM
BASOPHILS # BLD MANUAL: 0 THOUSAND/UL (ref 0–0.1)
BASOPHILS NFR MAR MANUAL: 0 % (ref 0–1)
BUN SERPL-MCNC: 32 MG/DL (ref 5–25)
CALCIUM SERPL-MCNC: 8.6 MG/DL (ref 8.3–10.1)
CHLORIDE SERPL-SCNC: 104 MMOL/L (ref 100–108)
CO2 SERPL-SCNC: 26 MMOL/L (ref 21–32)
CREAT SERPL-MCNC: 1.07 MG/DL (ref 0.6–1.3)
EOSINOPHIL # BLD MANUAL: 0 THOUSAND/UL (ref 0–0.4)
EOSINOPHIL NFR BLD MANUAL: 0 % (ref 0–6)
ERYTHROCYTE [DISTWIDTH] IN BLOOD BY AUTOMATED COUNT: 13 % (ref 11.6–15.1)
GFR SERPL CREATININE-BSD FRML MDRD: 68 ML/MIN/1.73SQ M
GLUCOSE SERPL-MCNC: 131 MG/DL (ref 65–140)
HCT VFR BLD AUTO: 39 % (ref 36.5–49.3)
HEMOCCULT STL QL: NEGATIVE
HGB BLD-MCNC: 13.3 G/DL (ref 12–17)
LYMPHOCYTES # BLD AUTO: 0.72 THOUSAND/UL (ref 0.6–4.47)
LYMPHOCYTES # BLD AUTO: 9 % (ref 14–44)
MACROCYTES BLD QL AUTO: PRESENT
MAGNESIUM SERPL-MCNC: 2.2 MG/DL (ref 1.6–2.6)
MCH RBC QN AUTO: 32.3 PG (ref 26.8–34.3)
MCHC RBC AUTO-ENTMCNC: 34.1 G/DL (ref 31.4–37.4)
MCV RBC AUTO: 95 FL (ref 82–98)
MONOCYTES # BLD AUTO: 0.24 THOUSAND/UL (ref 0–1.22)
MONOCYTES NFR BLD: 3 % (ref 4–12)
NEUTROPHILS # BLD MANUAL: 7.01 THOUSAND/UL (ref 1.85–7.62)
NEUTS BAND NFR BLD MANUAL: 1 % (ref 0–8)
NEUTS SEG NFR BLD AUTO: 87 % (ref 43–75)
NRBC BLD AUTO-RTO: 0 /100 WBCS
PHOSPHATE SERPL-MCNC: 3.7 MG/DL (ref 2.3–4.1)
PLATELET # BLD AUTO: 201 THOUSANDS/UL (ref 149–390)
PLATELET BLD QL SMEAR: ADEQUATE
PMV BLD AUTO: 9 FL (ref 8.9–12.7)
POTASSIUM SERPL-SCNC: 4.3 MMOL/L (ref 3.5–5.3)
PR INTERVAL: 188 MS
QRS AXIS: 26 DEGREES
QRSD INTERVAL: 98 MS
QT INTERVAL: 348 MS
QTC INTERVAL: 437 MS
RBC # BLD AUTO: 4.12 MILLION/UL (ref 3.88–5.62)
RBC MORPH BLD: PRESENT
SODIUM SERPL-SCNC: 140 MMOL/L (ref 136–145)
T WAVE AXIS: 3 DEGREES
TOTAL CELLS COUNTED SPEC: 100
VENTRICULAR RATE: 95 BPM
WBC # BLD AUTO: 7.97 THOUSAND/UL (ref 4.31–10.16)

## 2018-10-16 PROCEDURE — 94618 PULMONARY STRESS TESTING: CPT

## 2018-10-16 PROCEDURE — 97110 THERAPEUTIC EXERCISES: CPT

## 2018-10-16 PROCEDURE — 99233 SBSQ HOSP IP/OBS HIGH 50: CPT | Performed by: FAMILY MEDICINE

## 2018-10-16 PROCEDURE — 85027 COMPLETE CBC AUTOMATED: CPT | Performed by: STUDENT IN AN ORGANIZED HEALTH CARE EDUCATION/TRAINING PROGRAM

## 2018-10-16 PROCEDURE — 83735 ASSAY OF MAGNESIUM: CPT | Performed by: STUDENT IN AN ORGANIZED HEALTH CARE EDUCATION/TRAINING PROGRAM

## 2018-10-16 PROCEDURE — 85007 BL SMEAR W/DIFF WBC COUNT: CPT | Performed by: STUDENT IN AN ORGANIZED HEALTH CARE EDUCATION/TRAINING PROGRAM

## 2018-10-16 PROCEDURE — 94760 N-INVAS EAR/PLS OXIMETRY 1: CPT

## 2018-10-16 PROCEDURE — 84100 ASSAY OF PHOSPHORUS: CPT | Performed by: STUDENT IN AN ORGANIZED HEALTH CARE EDUCATION/TRAINING PROGRAM

## 2018-10-16 PROCEDURE — 99232 SBSQ HOSP IP/OBS MODERATE 35: CPT | Performed by: INTERNAL MEDICINE

## 2018-10-16 PROCEDURE — 76705 ECHO EXAM OF ABDOMEN: CPT

## 2018-10-16 PROCEDURE — 93010 ELECTROCARDIOGRAM REPORT: CPT | Performed by: INTERNAL MEDICINE

## 2018-10-16 PROCEDURE — 94640 AIRWAY INHALATION TREATMENT: CPT

## 2018-10-16 PROCEDURE — 80048 BASIC METABOLIC PNL TOTAL CA: CPT | Performed by: STUDENT IN AN ORGANIZED HEALTH CARE EDUCATION/TRAINING PROGRAM

## 2018-10-16 RX ORDER — IPRATROPIUM BROMIDE AND ALBUTEROL SULFATE 2.5; .5 MG/3ML; MG/3ML
3 SOLUTION RESPIRATORY (INHALATION)
Status: DISCONTINUED | OUTPATIENT
Start: 2018-10-16 | End: 2018-10-17 | Stop reason: HOSPADM

## 2018-10-16 RX ADMIN — PANTOPRAZOLE SODIUM 40 MG: 40 TABLET, DELAYED RELEASE ORAL at 10:21

## 2018-10-16 RX ADMIN — IPRATROPIUM BROMIDE AND ALBUTEROL SULFATE 3 ML: .5; 3 SOLUTION RESPIRATORY (INHALATION) at 07:46

## 2018-10-16 RX ADMIN — IPRATROPIUM BROMIDE AND ALBUTEROL SULFATE 3 ML: .5; 3 SOLUTION RESPIRATORY (INHALATION) at 21:26

## 2018-10-16 RX ADMIN — SUCRALFATE 1000 MG: 1 SUSPENSION ORAL at 06:22

## 2018-10-16 RX ADMIN — METHYLPREDNISOLONE SODIUM SUCCINATE 20 MG: 40 INJECTION, POWDER, FOR SOLUTION INTRAMUSCULAR; INTRAVENOUS at 06:22

## 2018-10-16 RX ADMIN — ENOXAPARIN SODIUM 40 MG: 40 INJECTION SUBCUTANEOUS at 10:21

## 2018-10-16 RX ADMIN — FLUTICASONE FUROATE AND VILANTEROL TRIFENATATE 1 PUFF: 200; 25 POWDER RESPIRATORY (INHALATION) at 10:20

## 2018-10-16 RX ADMIN — IPRATROPIUM BROMIDE AND ALBUTEROL SULFATE 3 ML: .5; 3 SOLUTION RESPIRATORY (INHALATION) at 00:42

## 2018-10-16 RX ADMIN — SUCRALFATE 1000 MG: 1 SUSPENSION ORAL at 22:33

## 2018-10-16 RX ADMIN — FAMOTIDINE 40 MG: 20 TABLET, FILM COATED ORAL at 22:33

## 2018-10-16 RX ADMIN — SUCRALFATE 1000 MG: 1 SUSPENSION ORAL at 18:42

## 2018-10-16 RX ADMIN — ZOLPIDEM TARTRATE 10 MG: 5 TABLET, FILM COATED ORAL at 03:00

## 2018-10-16 RX ADMIN — AMLODIPINE BESYLATE 10 MG: 10 TABLET ORAL at 10:21

## 2018-10-16 RX ADMIN — ZOLPIDEM TARTRATE 10 MG: 5 TABLET, FILM COATED ORAL at 22:33

## 2018-10-16 RX ADMIN — SUCRALFATE 1000 MG: 1 SUSPENSION ORAL at 12:22

## 2018-10-16 RX ADMIN — IPRATROPIUM BROMIDE AND ALBUTEROL SULFATE 3 ML: .5; 3 SOLUTION RESPIRATORY (INHALATION) at 14:30

## 2018-10-16 RX ADMIN — PANTOPRAZOLE SODIUM 40 MG: 40 TABLET, DELAYED RELEASE ORAL at 18:42

## 2018-10-16 RX ADMIN — METHYLPREDNISOLONE SODIUM SUCCINATE 20 MG: 40 INJECTION, POWDER, FOR SOLUTION INTRAMUSCULAR; INTRAVENOUS at 22:33

## 2018-10-16 RX ADMIN — METHYLPREDNISOLONE SODIUM SUCCINATE 20 MG: 40 INJECTION, POWDER, FOR SOLUTION INTRAMUSCULAR; INTRAVENOUS at 18:42

## 2018-10-16 RX ADMIN — IPRATROPIUM BROMIDE AND ALBUTEROL SULFATE 3 ML: .5; 3 SOLUTION RESPIRATORY (INHALATION) at 04:22

## 2018-10-16 RX ADMIN — TAMSULOSIN HYDROCHLORIDE 0.4 MG: 0.4 CAPSULE ORAL at 10:21

## 2018-10-16 NOTE — PROGRESS NOTES
[facility] Practice Progress Note - Aislinn Parcel 75 y o  male MRN: [id number]    Unit/Bed#: 2 [place] [address] Encounter: [id number]      Assessment/Plan:  * Chronic respiratory failure with hypoxia, on home O2 therapy [facility])   Assessment & Plan    On admission, patient states he has had worsening SOB especially on exertion for 3 days  On 3L O2 at home  In ED received Prednisone 60 mg PO, Duo-neb 3 ml x2, 1 L NS   -10/11 CXR: Left basilar streaky density possibly subsegmental atelectasis  -10/11 CT: No evidence PE, Several small pulmonary nodules   New left upper lobe pulmonary nodule  F/U CT scan in 6 mo  -Urine strep/legionella negative  -Continue supplemental O2  -Pulmonology consulted, appreciate recommendations   -Solumedrol 20mg q8h   -Continue Breo   -Continue Duonebs q4h scheduled and q2h PRN   -For lung nodules, repeat CT chest outpatient in 6 months  -If he tolerates the current dosing of solumedrol 20mg q8h, may be ready to discharge tomorrow on PO prednisone taper      Gastroesophageal reflux disease without esophagitis   Assessment & Plan    Patientt reports Nausea and mild epigastric discomfort  Recently started on Sucralfate 10 ml QiD w/out relief  On Prilosec at home   States he is due for EGD in January, last EGD was 3/10/17 and it was normal   -Continue sucralfate 1g QID  -Zofran PRN nausea   -GI consulted, recommendations appreciated   -Protonix increased to 40mg BID    -Pepcid 40mg QHS added   -US showed no acute abnormality and hepatomegaly with increased liver echotexture likely hepatic steatosis     -EGD may be done outpatient      Lung nodules   Assessment & Plan    10/11/18 CT: Several small pulmonary nodules are again visualized   Right upper lobe nodule measuring 8 mm is unchanged   4 mm right lower lobe nodule is noted   2 mm left lower lobe nodule is unchanged   Several adjacent   triangular-shaped right lower lobe   Visual nodules are again visualized   Bulla is seen in the right lower lobe  Angy Mares is a new left upper lobe pulmonary nodule measuring 5 mm  -Continue O/p follow up with Dr Long   -Repeat CT in 6 months outpatient     Weakness generalized   Assessment & Plan    On arrival, patient admitted to have decreased appetite over a month, feels so weak he can barely get out of bed   -Monitor electrolytes and vitals through hospital stay, replete as appropriate  -PT/OT consulted, current recommendation to return home with family support/possibly services, will continue to follow      HTN (hypertension)   Assessment & Plan    -Continue home medication Amlodipine 10 mg qd     BPH (benign prostatic hyperplasia)   Assessment & Plan    Stable, reports no changes in urination   -Continue home medication Flomax 0 4 mg qd     AAT (alpha-1-antitrypsin) deficiency (HCC)   Assessment & Plan    -Continue Zemaira Weekly outpatient         Subjective:   Patient seen and examined at bedside this morning  No acute overnight events reported  Scheduled for ultrasound today per GI recommendation  Denies any worsening respiratory symptoms  Objective:     Vitals: Blood pressure 144/68, pulse 76, temperature 98 5 °F (36 9 °C), temperature source Oral, resp  rate 19, height 6' 5" (1 956 m), weight 78 4 kg (172 lb 13 5 oz), SpO2 96 %  ,Body mass index is 20 5 kg/m²    Wt Readings from Last 3 Encounters:   10/11/18 78 4 kg (172 lb 13 5 oz)   10/02/18 82 6 kg (182 lb)   07/06/18 82 6 kg (182 lb)       Intake/Output Summary (Last 24 hours) at 10/16/18 1505  Last data filed at 10/16/18 3329   Gross per 24 hour   Intake                0 ml   Output              300 ml   Net             -300 ml       Physical Exam: General appearance: alert and oriented, in no acute distress  Lungs: diminished breath sounds  Heart: regular rate and rhythm, S1, S2 normal, no murmur, click, rub or gallop  Abdomen: soft, non-tender; bowel sounds normal; no masses,  no organomegaly  Extremities: extremities normal, warm and well-perfused; no cyanosis, clubbing, or edema     Recent Results (from the past 24 hour(s))   CBC and differential    Collection Time: 10/16/18  6:53 AM   Result Value Ref Range    WBC 7 97 4 31 - 10 16 Thousand/uL    RBC 4 12 3 88 - 5 62 Million/uL    Hemoglobin 13 3 12 0 - 17 0 g/dL    Hematocrit 39 0 36 5 - 49 3 %    MCV 95 82 - 98 fL    MCH 32 3 26 8 - 34 3 pg    MCHC 34 1 31 4 - 37 4 g/dL    RDW 13 0 11 6 - 15 1 %    MPV 9 0 8 9 - 12 7 fL    Platelets 137 686 - 713 Thousands/uL    nRBC 0 /100 WBCs   Magnesium    Collection Time: 10/16/18  6:53 AM   Result Value Ref Range    Magnesium 2 2 1 6 - 2 6 mg/dL   Phosphorus    Collection Time: 10/16/18  6:53 AM   Result Value Ref Range    Phosphorus 3 7 2 3 - 4 1 mg/dL   Basic metabolic panel    Collection Time: 10/16/18  6:53 AM   Result Value Ref Range    Sodium 140 136 - 145 mmol/L    Potassium 4 3 3 5 - 5 3 mmol/L    Chloride 104 100 - 108 mmol/L    CO2 26 21 - 32 mmol/L    ANION GAP 10 4 - 13 mmol/L    BUN 32 (H) 5 - 25 mg/dL    Creatinine 1 07 0 60 - 1 30 mg/dL    Glucose 131 65 - 140 mg/dL    Calcium 8 6 8 3 - 10 1 mg/dL    eGFR 68 ml/min/1 73sq m   Manual Differential(PHLEBS Do Not Order)    Collection Time: 10/16/18  6:53 AM   Result Value Ref Range    Segmented % 87 (H) 43 - 75 %    Bands % 1 0 - 8 %    Lymphocytes % 9 (L) 14 - 44 %    Monocytes % 3 (L) 4 - 12 %    Eosinophils % 0 0 - 6 %    Basophils % 0 0 - 1 %    Absolute Neutrophils 7 01 1 85 - 7 62 Thousand/uL    Lymphocytes Absolute 0 72 0 60 - 4 47 Thousand/uL    Monocytes Absolute 0 24 0 00 - 1 22 Thousand/uL    Eosinophils Absolute 0 00 0 00 - 0 40 Thousand/uL    Basophils Absolute 0 00 0 00 - 0 10 Thousand/uL    Total Counted 100     RBC Morphology Present     Macrocytes Present     Platelet Estimate Adequate Adequate       Current Facility-Administered Medications   Medication Dose Route Frequency Provider Last Rate Last Dose    amLODIPine (NORVASC) tablet 10 mg  10 mg Oral Daily Ema Fan, MD   10 mg at 10/16/18 1021    enoxaparin (LOVENOX) subcutaneous injection 40 mg  40 mg Subcutaneous Daily Jeremy Barbour MD   40 mg at 10/16/18 1021    famotidine (PEPCID) tablet 40 mg  40 mg Oral HS Elisa Geiger PA-C   40 mg at 10/15/18 2218    fluticasone-vilanterol (BREO ELLIPTA) 200-25 MCG/INH inhaler 1 puff  1 puff Inhalation Daily Jeremy Barbour MD   1 puff at 10/16/18 1020    ipratropium-albuterol (DUO-NEB) 0 5-2 5 mg/3 mL inhalation solution 3 mL  3 mL Nebulization Q2H PRN Jeremy Barbour MD        ipratropium-albuterol (DUO-NEB) 0 5-2 5 mg/3 mL inhalation solution 3 mL  3 mL Nebulization Q6H Beatriz Fleming MD   3 mL at 10/16/18 1430    methylPREDNISolone sodium succinate (Solu-MEDROL) injection 20 mg  20 mg Intravenous [facility] Gina Choudhary DO   20 mg at 10/16/18 0622    ondansetron (ZOFRAN) injection 4 mg  4 mg Intravenous Q6H PRN Jeremy Barbour MD        pantoprazole (PROTONIX) EC tablet 40 mg  40 mg Oral BID Elisa Geiger PA-C   40 mg at 10/16/18 1021    simethicone (MYLICON) chewable tablet 80 mg  80 mg Oral 4x Daily PRN Jeremy Barbour MD   80 mg at 10/12/18 2028    sucralfate (CARAFATE) oral suspension 1,000 mg  1,000 mg Oral 4x Daily (AC & HS) Jeremy Barbour MD   1,000 mg at 10/16/18 1222    tamsulosin (FLOMAX) capsule 0 4 mg  0 4 mg Oral Daily Jeremy Barbour MD   0 4 mg at 10/16/18 1021    zolpidem (AMBIEN) tablet 10 mg  10 mg Oral HS PRN Jeremy Barbour MD   10 mg at 10/16/18 0300       Invasive Devices     Peripheral Intravenous Line            Peripheral IV 10/11/18 Right Antecubital 4 days    Peripheral IV 10/16/18 Left Forearm less than 1 day                Lab, Imaging and other studies: I have personally reviewed pertinent reports      VTE Pharmacologic Prophylaxis: Enoxaparin (Lovenox)  VTE Mechanical Prophylaxis: sequential compression device    Cesia Luz DO

## 2018-10-16 NOTE — PROGRESS NOTES
Progress Note - Pulmonary   Marshell Dakin 75 y o  male MRN: [id number]  Unit/Bed#: 1055 [facility] Encounter: [id number]      Assessment/Plan:   1  Acute COPD Exacerbation with chronic respiratory failure with hypoxia: Last FEV2 was 40% predicted  Pt continued on home 3L NC O2, saturating well between 96-98%  Will continue with duo-neb qid and Breo 1 puff daily during hospital stay  Lungs clear to auscultation today, no wheezes appreciated  Continue with solumedrol 20 q8h today  Home advair, incruse ellipta held during hospital admission - plan to resume on discharge     2  Pulmonary lung nodules:  As seen on CT 10/11/18: Several small pulmonary nodules are again visualized   Right upper lobe nodule measuring 8 mm is unchanged   4 mm right lower lobe nodule is noted   2 mm left lower lobe nodule is unchanged   Several adjacent triangular-shaped right lower lobe   Visual nodules are again visualized  Blilll Gross is seen in the right lower lobe  Shade Pale is a new left upper lobe pulmonary nodule measuring 5 mm   -Repeat outpatient CT Chest in 6 months     3  Alpha 1-antitrypsin deficiency (PiSZ)  Pt on weekly zemaira  Last infusion was on 10/9, next scheduled infusion on 10/18 outpt        ______________________________________________________________________    Subjective: Pt seen and examined at bedside  No acute event noted overnight  States he feels well today, breathing is back to baseline  Continues to have SOB with minimal exertion, unchanged from baseline  Vitals:   Temp:  [97 °F (36 1 °C)-98 3 °F (36 8 °C)] 97 °F (36 1 °C)  HR:  [74-85] 74  Resp:  [18] 18  BP: (156-171)/(74-84) 156/74  Weight (last 2 days)     None        Oxygen Therapy  SpO2: 97 %  O2 Flow Rate (L/min): 3 L/min             Nutrition:        Diet Orders            Start     Ordered    10/12/18 0838  Room Service  Once     Question:  Type of Service  Answer:  Room Service-Appropriate    10/12/18 1995    10/11/18 0074  Diet Regular; Regular House Diet effective now     Question Answer Comment   Diet Type Regular    Regular Regular House    RD to adjust diet per protocol? Yes        10/11/18 7456          Ins/Outs:   I/O       10/14 0701 - 10/15 0700 10/15 0701 - 10/16 0700 10/16 0701 - 10/17 0700    P  O   240     I V  (mL/kg)  10 (0 1)     Total Intake(mL/kg)  250 (3 2)     Urine (mL/kg/hr) 800 (0 4) 650 (0 3)     Total Output 800 650      Net -800 -400             Unmeasured Stool Occurrence  1 x           Lines/Drains:  Invasive Devices     Peripheral Intravenous Line            Peripheral IV 10/11/18 Right Antecubital 4 days    Peripheral IV 10/16/18 Left Forearm less than 1 day                 Active medications:  Scheduled Meds:  Current Facility-Administered Medications:  amLODIPine 10 mg Oral Daily Riley Soulier, MD   enoxaparin 40 mg Subcutaneous Daily Riley Soulier, MD   famotidine 40 mg Oral HS Gaye Wallace PA-C   fluticasone-vilanterol 1 puff Inhalation Daily Riley Soulier, MD   ipratropium-albuterol 3 mL Nebulization Q4H Riley Soulier, MD   ipratropium-albuterol 3 mL Nebulization Q2H PRN Riley Soulier, MD   methylPREDNISolone sodium succinate 20 mg Intravenous Q8H [facility] Gina Choudhary DO   ondansetron 4 mg Intravenous Q6H PRN Riley Soulier, MD   pantoprazole 40 mg Oral BID Gaye Wallace PA-C   simethicone 80 mg Oral 4x Daily PRN Riley Soulier, MD   sucralfate 1,000 mg Oral 4x Daily (AC & HS) Riley Soulier, MD   tamsulosin 0 4 mg Oral Daily Riley Soulier, MD   zolpidem 10 mg Oral HS PRN Riley Soulier, MD     PRN Meds:  ipratropium-albuterol 3 mL Q2H PRN   ondansetron 4 mg Q6H PRN   simethicone 80 mg 4x Daily PRN   zolpidem 10 mg HS PRN     ____________________________________________________________________      Physical Exam   Constitutional: He is oriented to person, place, and time  He appears well-developed and well-nourished  No distress  Cardiovascular: Normal rate  Pulmonary/Chest: No respiratory distress     On 3L NC O2  Clear to auscultation, no wheezing appreciated   Abdominal: Soft  Bowel sounds are normal    Musculoskeletal: He exhibits no edema  Neurological: He is alert and oriented to person, place, and time  Skin: He is not diaphoretic  Nursing note and vitals reviewed  ____________________________________________________________________    Labs:   CBC:   Results from last 7 days  Lab Units 10/16/18  0653 10/15/18  0637 10/14/18  0609   WBC Thousand/uL 7 97 8 02 9 13   HEMOGLOBIN g/dL 13 3 13 1 13 2   HEMATOCRIT % 39 0 39 0 39 3   MCV fL 95 95 95   PLATELETS Thousands/uL 201 180 197     CMP:   Results from last 7 days  Lab Units 10/16/18  0653 10/15/18  0637 10/14/18  0609  10/11/18  1839   SODIUM mmol/L 140 139 139  < > 141   POTASSIUM mmol/L 4 3 4 1 4 0  < > 3 3*   CHLORIDE mmol/L 104 104 104  < > 102   CO2 mmol/L 26 26 24  < > 26   BUN mg/dL 32* 26* 26*  < > 18   CREATININE mg/dL 1 07 1 00 0 98  < > 1 24   CALCIUM mg/dL 8 6 8 6 8 7  < > 9 6   AST U/L  --   --   --   --  19   ALT U/L  --   --   --   --  36   ALK PHOS U/L  --   --   --   --  71   EGFR ml/min/1 73sq m 68 74 76  < > 57   < > = values in this interval not displayed  Magnesium:   Results from last 7 days  Lab Units 10/16/18  0653   MAGNESIUM mg/dL 2 2     Phosphorous:   Results from last 7 days  Lab Units 10/16/18  0653   PHOSPHORUS mg/dL 3 7     Troponin:   Results from last 7 days  Lab Units 10/11/18  1839   TROPONIN I ng/mL <0 02     PT/INR:   Results from last 7 days  Lab Units 10/11/18  1839   PTT seconds 27   INR  1 03     Lactic Acid:     BNP:     TSH:     Procalcitonin: Invalid input(s): PROCALCITONIN      Imaging:   US abdomen limited   Final Result by Mono Sneed MD (76/99 9648)      1  No acute abnormality  2   Hepatomegaly with moderately increased liver echotexture likely representing hepatic steatosis              Workstation performed: [id number]         XR chest 1 view portable   Final Result by Dafne Worthington MD (10/11 3521) Left basilar streaky density possibly subsegmental atelectasis  Workstation performed: [id number]         CTA chest ct abdomen pelvis w contrast   Final Result by Silva Estrada DO (10/11 2053)      No evidence of pulmonary embolus      Several small pulmonary nodules  New left upper lobe pulmonary nodule  Follow-up CT scan in 3-6 months is recommended  The study was marked in EPIC for significant notification  Workstation performed: [id number]                 Micro: Lab Results   Component Value Date    BLOODCX No Growth After 5 Days  12/29/2017    BLOODCX No Growth After 5 Days  12/29/2017    MRSACULTURE (A) 10/11/2018     Methicillin Resistant Staphylococcus aureus isolated    MRSACULTURE  10/11/2018     Please note: This patient requires contact precautions              Invalid input(s): LEGIONELLAURINARYANTIGEN        Code Status: Level 1 - Full Code

## 2018-10-16 NOTE — RESPIRATORY THERAPY NOTE
Home Oxygen Qualifying Test       Patient name: Corey Baumgarten        : 1945   Date of Test:  2018  Diagnosis:      Home Oxygen Test:    **Medicare Guidelines require item(s) 1-5 on all ambulatory patients or 1 and 2 on non-ambulatory patients  1   Baseline SPO2 on Room Air at rest  88%  2   SPO2 on Oxygen at rest 95% 3 lpm     3   SPO2 during exercise on Oxygen  94% a liter flow of  4 lpm     4   Exercise performed:          duration 6 (min)  450 ft          [x]  Supplemental Home Oxygen is indicated  []  Client does not qualify for home oxygen         Respiratory Additional Notes- pt ambulated in hallways with quincy Lozano, RT

## 2018-10-16 NOTE — PLAN OF CARE
Problem: PHYSICAL THERAPY ADULT  Goal: Performs mobility at highest level of function for planned discharge setting  See evaluation for individualized goals  Treatment/Interventions: ADL retraining, Functional transfer training, Therapeutic exercise, Endurance training, Gait training, Bed mobility, Equipment eval/education, Patient/family training  Equipment Recommended:  (pt has 02 walker cane)       See flowsheet documentation for full assessment, interventions and recommendations  Outcome: Progressing  Prognosis: Good  Problem List: Decreased strength, Decreased endurance, Impaired balance, Decreased mobility  Assessment: Pt needing less assist overall for functional mobility  Pt will cont to benefit from skilled PT services to increase his strength, balance, gait, endurance and mobility  Recommendation: Home with family support (pulmonary rehab)          See flowsheet documentation for full assessment

## 2018-10-16 NOTE — PHYSICAL THERAPY NOTE
PT TREATMENT     10/16/18 2215   Pain Assessment   Pain Assessment No/denies pain   Restrictions/Precautions   Other Precautions O2;Fall Risk  (SOB)   General   Chart Reviewed Yes   Family/Caregiver Present No   Subjective   Subjective "going home today"   Bed Mobility   Supine to Sit 7  Independent   Sit to Supine 7  Independent   Transfers   Sit to Stand 5  Supervision   Stand to Sit 5  Supervision   Ambulation/Elevation   Gait pattern Forward Flexion  (slow brad)   Gait Assistance 5  Supervision   Additional items Assist x 1;Verbal cues; Tactile cues   Assistive Device Rolling walker   Distance 60 feet with change in direction;SAO2 on room air at rest 96% and on 3L O2 for amb 95% at end of amb, pt moderately SOB  Balance   Static Sitting Fair +   Static Standing Fair   Dynamic Standing Fair   Ambulatory Fair   Activity Tolerance   Activity Tolerance Patient limited by fatigue  (weakness and SOB)   Assessment   Assessment Pt needing less assist overall for functional mobility  Pt will cont to benefit from skilled PT services to increase his strength, balance, gait, endurance and mobility  Goals   Treatment Day 3   Plan   Treatment/Interventions ADL retraining;Functional transfer training;LE strengthening/ROM; Elevations; Therapeutic exercise; Endurance training;Patient/family training;Gait training   PT Frequency 5x/wk   Recommendation   Recommendation Home with family support  (pulmonary rehab)   Equipment Recommended (pt has a walker, cane and O2)   Licensure   [place] License Number  [facility] [place] [id number]

## 2018-10-16 NOTE — RESPIRATORY THERAPY NOTE
Copd education given for s/s of copd exacerbation, copd zone tool, energy conservation techniques, nutrition/diet, pursed lip breathing

## 2018-10-16 NOTE — UTILIZATION REVIEW
Continued Stay Review    Date: 10/15/2018    Vital Signs: Blood pressure 167/77, pulse 81, temperature 98 1 °F (36 7 °C), temperature source Oral, resp  rate 18, height 6' 5" (1 956 m), weight 78 4 kg (172 lb 13 5 oz), SpO2 97 %  ,Body mass index is 20 5 kg/m²  Medications:   Scheduled Meds:   Current Facility-Administered Medications:  amLODIPine 10 mg Oral Daily   enoxaparin 40 mg Subcutaneous Daily   famotidine 40 mg Oral HS   fluticasone-vilanterol 1 puff Inhalation Daily   ipratropium-albuterol 3 mL Nebulization Q2H PRN   ipratropium-albuterol 3 mL Nebulization Q6H   methylPREDNISolone sodium succinate 20 mg Intravenous Q8H [facility]   ondansetron 4 mg Intravenous Q6H PRN   pantoprazole 40 mg Oral BID   simethicone 80 mg Oral 4x Daily PRN   sucralfate 1,000 mg Oral 4x Daily (AC & HS)   tamsulosin 0 4 mg Oral Daily   zolpidem 10 mg Oral HS PRN     Continuous Infusions:    PRN Meds:     Zolpidem - used x 1  Abnormal Labs/Diagnostic Results:   Wbc 8 02    Bun 26  Age/Sex: 75 y o  male     Assessment/Plan: This is a 75year old male admitted from home on 10/11/2018 with worsening SOB, on home oxygen felt to have a acute COPD exacerbation  Patient needs continued inpatient care for continuation of IV steroids, rodrigue neb and BREO  Discharge Plan: PT recommends pulmonary rehab

## 2018-10-17 ENCOUNTER — TELEPHONE (OUTPATIENT)
Dept: GASTROENTEROLOGY | Facility: AMBULARY SURGERY CENTER | Age: 73
End: 2018-10-17

## 2018-10-17 VITALS
RESPIRATION RATE: 20 BRPM | BODY MASS INDEX: 20.41 KG/M2 | DIASTOLIC BLOOD PRESSURE: 68 MMHG | HEIGHT: 77 IN | WEIGHT: 172.84 LBS | OXYGEN SATURATION: 97 % | HEART RATE: 87 BPM | SYSTOLIC BLOOD PRESSURE: 152 MMHG | TEMPERATURE: 97.5 F

## 2018-10-17 LAB
ANION GAP SERPL CALCULATED.3IONS-SCNC: 9 MMOL/L (ref 4–13)
BASOPHILS # BLD MANUAL: 0 THOUSAND/UL (ref 0–0.1)
BASOPHILS NFR MAR MANUAL: 0 % (ref 0–1)
BUN SERPL-MCNC: 33 MG/DL (ref 5–25)
CALCIUM SERPL-MCNC: 8.5 MG/DL (ref 8.3–10.1)
CHLORIDE SERPL-SCNC: 103 MMOL/L (ref 100–108)
CO2 SERPL-SCNC: 25 MMOL/L (ref 21–32)
CREAT SERPL-MCNC: 0.94 MG/DL (ref 0.6–1.3)
EOSINOPHIL # BLD MANUAL: 0 THOUSAND/UL (ref 0–0.4)
EOSINOPHIL NFR BLD MANUAL: 0 % (ref 0–6)
ERYTHROCYTE [DISTWIDTH] IN BLOOD BY AUTOMATED COUNT: 13.1 % (ref 11.6–15.1)
GFR SERPL CREATININE-BSD FRML MDRD: 80 ML/MIN/1.73SQ M
GLUCOSE SERPL-MCNC: 132 MG/DL (ref 65–140)
HCT VFR BLD AUTO: 39 % (ref 36.5–49.3)
HGB BLD-MCNC: 13.4 G/DL (ref 12–17)
LYMPHOCYTES # BLD AUTO: 0.61 THOUSAND/UL (ref 0.6–4.47)
LYMPHOCYTES # BLD AUTO: 7 % (ref 14–44)
MAGNESIUM SERPL-MCNC: 2.1 MG/DL (ref 1.6–2.6)
MCH RBC QN AUTO: 32.5 PG (ref 26.8–34.3)
MCHC RBC AUTO-ENTMCNC: 34.4 G/DL (ref 31.4–37.4)
MCV RBC AUTO: 95 FL (ref 82–98)
MONOCYTES # BLD AUTO: 0.26 THOUSAND/UL (ref 0–1.22)
MONOCYTES NFR BLD: 3 % (ref 4–12)
NEUTROPHILS # BLD MANUAL: 7.82 THOUSAND/UL (ref 1.85–7.62)
NEUTS SEG NFR BLD AUTO: 90 % (ref 43–75)
NRBC BLD AUTO-RTO: 0 /100 WBCS
PHOSPHATE SERPL-MCNC: 3.8 MG/DL (ref 2.3–4.1)
PLATELET # BLD AUTO: 189 THOUSANDS/UL (ref 149–390)
PLATELET BLD QL SMEAR: ADEQUATE
PMV BLD AUTO: 9.1 FL (ref 8.9–12.7)
POTASSIUM SERPL-SCNC: 4.3 MMOL/L (ref 3.5–5.3)
RBC # BLD AUTO: 4.12 MILLION/UL (ref 3.88–5.62)
RBC MORPH BLD: NORMAL
SODIUM SERPL-SCNC: 137 MMOL/L (ref 136–145)
TOTAL CELLS COUNTED SPEC: 100
WBC # BLD AUTO: 8.69 THOUSAND/UL (ref 4.31–10.16)

## 2018-10-17 PROCEDURE — 85007 BL SMEAR W/DIFF WBC COUNT: CPT | Performed by: STUDENT IN AN ORGANIZED HEALTH CARE EDUCATION/TRAINING PROGRAM

## 2018-10-17 PROCEDURE — 94640 AIRWAY INHALATION TREATMENT: CPT

## 2018-10-17 PROCEDURE — 99232 SBSQ HOSP IP/OBS MODERATE 35: CPT | Performed by: INTERNAL MEDICINE

## 2018-10-17 PROCEDURE — 94760 N-INVAS EAR/PLS OXIMETRY 1: CPT

## 2018-10-17 PROCEDURE — 84100 ASSAY OF PHOSPHORUS: CPT | Performed by: STUDENT IN AN ORGANIZED HEALTH CARE EDUCATION/TRAINING PROGRAM

## 2018-10-17 PROCEDURE — 80048 BASIC METABOLIC PNL TOTAL CA: CPT | Performed by: STUDENT IN AN ORGANIZED HEALTH CARE EDUCATION/TRAINING PROGRAM

## 2018-10-17 PROCEDURE — 83735 ASSAY OF MAGNESIUM: CPT | Performed by: STUDENT IN AN ORGANIZED HEALTH CARE EDUCATION/TRAINING PROGRAM

## 2018-10-17 PROCEDURE — 85027 COMPLETE CBC AUTOMATED: CPT | Performed by: STUDENT IN AN ORGANIZED HEALTH CARE EDUCATION/TRAINING PROGRAM

## 2018-10-17 PROCEDURE — 97535 SELF CARE MNGMENT TRAINING: CPT

## 2018-10-17 RX ORDER — PREDNISONE 10 MG/1
TABLET ORAL
Qty: 42 TABLET | Refills: 0 | Status: SHIPPED | OUTPATIENT
Start: 2018-10-17 | End: 2018-11-07 | Stop reason: ALTCHOICE

## 2018-10-17 RX ORDER — IPRATROPIUM BROMIDE AND ALBUTEROL SULFATE 2.5; .5 MG/3ML; MG/3ML
3 SOLUTION RESPIRATORY (INHALATION) 4 TIMES DAILY
Qty: 60 VIAL | Refills: 0 | Status: ON HOLD | OUTPATIENT
Start: 2018-10-17 | End: 2019-11-15 | Stop reason: SDUPTHER

## 2018-10-17 RX ADMIN — SUCRALFATE 1000 MG: 1 SUSPENSION ORAL at 11:40

## 2018-10-17 RX ADMIN — IPRATROPIUM BROMIDE AND ALBUTEROL SULFATE 3 ML: .5; 3 SOLUTION RESPIRATORY (INHALATION) at 13:47

## 2018-10-17 RX ADMIN — PANTOPRAZOLE SODIUM 40 MG: 40 TABLET, DELAYED RELEASE ORAL at 09:37

## 2018-10-17 RX ADMIN — IPRATROPIUM BROMIDE AND ALBUTEROL SULFATE 3 ML: .5; 3 SOLUTION RESPIRATORY (INHALATION) at 02:51

## 2018-10-17 RX ADMIN — TAMSULOSIN HYDROCHLORIDE 0.4 MG: 0.4 CAPSULE ORAL at 09:37

## 2018-10-17 RX ADMIN — AMLODIPINE BESYLATE 10 MG: 10 TABLET ORAL at 09:37

## 2018-10-17 RX ADMIN — ENOXAPARIN SODIUM 40 MG: 40 INJECTION SUBCUTANEOUS at 09:38

## 2018-10-17 RX ADMIN — IPRATROPIUM BROMIDE AND ALBUTEROL SULFATE 3 ML: .5; 3 SOLUTION RESPIRATORY (INHALATION) at 07:46

## 2018-10-17 RX ADMIN — SUCRALFATE 1000 MG: 1 SUSPENSION ORAL at 06:26

## 2018-10-17 RX ADMIN — METHYLPREDNISOLONE SODIUM SUCCINATE 20 MG: 40 INJECTION, POWDER, FOR SOLUTION INTRAMUSCULAR; INTRAVENOUS at 15:23

## 2018-10-17 RX ADMIN — FLUTICASONE FUROATE AND VILANTEROL TRIFENATATE 1 PUFF: 200; 25 POWDER RESPIRATORY (INHALATION) at 09:35

## 2018-10-17 RX ADMIN — METHYLPREDNISOLONE SODIUM SUCCINATE 20 MG: 40 INJECTION, POWDER, FOR SOLUTION INTRAMUSCULAR; INTRAVENOUS at 06:26

## 2018-10-17 NOTE — DISCHARGE SUMMARY
[facility] Discharge Summary - Medical Sergio Silver 75 y o  male MRN: [id number]    [address] / Bed: [address] Encounter: [id number]    BRIEF OVERVIEW  Admitting Provider: Vanessa Rhodes MD  Discharge Provider: Vanessa Rhodes MD    Discharge To: Home with VNA services    Outpatient Follow-Up:    [facility], Dr Sancho Johnson, 10/25/18 at [address] [place] [address] Dr - Dr Kvng Almanzar, make appt in 1 week   Gastroenterology - Dr Pierre Cody, make appt in 1 week  Things to address at first follow up visit:    Improvement in breathing   Setting up endoscopic evaluation   Steroid tapering       Admission Date: 10/11/2018     Discharge Date: 10/17/18    Primary Discharge Diagnosis  Principal Problem:    Chronic respiratory failure with hypoxia, on home O2 therapy ([facility] [address] )  Active Problems:    Gastroesophageal reflux disease without esophagitis    Lung nodules    Weakness generalized    HTN (hypertension)    BPH (benign prostatic hyperplasia)    AAT (alpha-1-antitrypsin) deficiency ([facility] [address] )  Resolved Problems:    * No resolved hospital problems  Rosa Almanzar  Gastroenterology - Dr Jose Blevins    HPI  Per admission H&P: 75 y o  male with PMH Alpha-1-Antitrypsin deficiency (2001), HTN, COPD, BPH, Hx of Lung nodules( 1/2018), Hx of PE, presents to the ED c/o SOB and generalized weakness since the past 3 days  He states that he was in his usual state of health till then, and uses 3 L O2 at home, but has been finding it difficult to get out of bed since that time  He also states that he has severe shortness of breath even after walking just a few steps and improves when he lays down  He also complains of epigastric discomfort that feels like " something is just constantly pressing on my stomach", and heartburn   He was seen by Jaja Medina on 10/2 who added Sucralfate in addition to his Prilosec 40 mg, but has had minimal relief  Patient states that he has been nauseous for a long time and has had a reduced appetite for the past month  He states that he his bowel movements are almost always loose for the past 30 years  Denies Fever, chills, Vomit, constipation, changes in urination, recent illnesses, and Chest pain  He also notes that his arms have numbness and tingling  Patient states he had a similar episode in April  He gets weekly Zemaira on Tues for A1AT  Hospital Course  * Chronic respiratory failure with hypoxia, on home O2 therapy [facility])   Assessment & Plan    On admission, patient states he has had worsening SOB especially on exertion for 3 days  On 3L O2 at home  In ED received Prednisone 60 mg PO, Duo-neb 3 ml x2, 1 L NS   -10/11 CXR: Left basilar streaky density possibly subsegmental atelectasis  -10/11 CT: No evidence PE, Several small pulmonary nodules   New left upper lobe pulmonary nodule  F/U CT scan in 6 mo  -Urine strep/legionella negative  -Continue supplemental O2  -Pulmonology consulted, appreciate recommendations   -Solumedrol 20mg q8h   -Continue Breo   -Continue Duonebs q4h scheduled and q2h PRN   -For lung nodules, repeat CT chest outpatient in 6 months  -Tolerated decreased steroids in hospital, per pulmonology okay to discharge home on long steroid taper (60mg daily x3 days, 40mg daily x3 days, 20mg daily x3 days, 10mg daily x3 days, 5mg daily x3 days)  Instructed to hold Incruse while on DuoNebs until follow up with pulmonology in 1 week  Nebulizer refills sent to pharmacy  Patient's nurse was asked to administer his remaining daily solumedrol doses today prior to him leaving the hospital       Gastroesophageal reflux disease without esophagitis   Assessment & Plan    Patientt reports Nausea and mild epigastric discomfort  Recently started on Sucralfate 10 ml QiD w/out relief  On Prilosec at home   States he is due for EGD in January, last EGD was 3/10/17 and it was normal   -Continue sucralfate 1g QID  -Zofran PRN nausea   -GI consulted, recommendations appreciated   -Protonix increased to 40mg BID    -Pepcid 40mg QHS added   -US showed no acute abnormality and hepatomegaly with increased liver echotexture likely hepatic steatosis     -EGD may be done outpatient   -Referral placed for outpatient GI follow up and EGD      Lung nodules   Assessment & Plan    10/11/18 CT: Several small pulmonary nodules are again visualized   Right upper lobe nodule measuring 8 mm is unchanged   4 mm right lower lobe nodule is noted   2 mm left lower lobe nodule is unchanged   Several adjacent   triangular-shaped right lower lobe   Visual nodules are again visualized  Yesika Si is seen in the right lower lobe   There is a new left upper lobe pulmonary nodule measuring 5 mm  -Continue O/p follow up with Dr Long   -Repeat CT in 6 months outpatient     Weakness generalized   Assessment & Plan    On arrival, patient admitted to have decreased appetite over a month, feels so weak he can barely get out of bed   -Monitor electrolytes and vitals through hospital stay, replete as appropriate  -PT/OT consulted, current recommendation to return home with family support/possibly services  -Home VNA services set up at time of discharge      HTN (hypertension)   Assessment & Plan    -Continue home medication Amlodipine 10 mg qd     BPH (benign prostatic hyperplasia)   Assessment & Plan    Stable, reports no changes in urination   -Continue home medication Flomax 0 4 mg qd     AAT (alpha-1-antitrypsin) deficiency (HCC)   Assessment & Plan    -Continue Zemaira Weekly outpatient         Physical Exam at Discharge  /68 (BP Location: Left arm)   Pulse 87   Temp 97 5 °F (36 4 °C) (Oral)   Resp 20   Ht 6' 5" (1 956 m)   Wt 78 4 kg (172 lb 13 5 oz)   SpO2 96%   BMI 20 50 kg/m²   General appearance: alert and oriented, in no acute distress  Head: Normocephalic, without obvious abnormality, atraumatic  Lungs: clear to auscultation bilaterally  Heart: regular rate and rhythm, S1, S2 normal, no murmur, click, rub or gallop  Abdomen: soft, non-tender; bowel sounds normal; no masses,  no organomegaly  Extremities: extremities normal, warm and well-perfused; no cyanosis, clubbing, or edema  Skin: Skin color, texture, turgor normal  No rashes or lesions  Neurologic: Grossly normal    Procedures Performed/Pertinent Test results  10/11/18 CXR  IMPRESSION:  Left basilar streaky density possibly subsegmental atelectasis  10/11/18 CTA chest  IMPRESSION:  No evidence of pulmonary embolus  Several small pulmonary nodules  New left upper lobe pulmonary nodule  Follow-up CT scan in 3-6 months is recommended  10/16/18 US abdomen  IMPRESSION:  1  No acute abnormality  2   Hepatomegaly with moderately increased liver echotexture likely representing hepatic steatosis        Medications   Your Medications     STOP taking these medications     STOP taking these medications    apixaban 5 mg   Commonly known as: ELIQUIS     INCRUSE ELLIPTA 62 5 mcg/inh Aepb inhaler   Generic drug: umeclidinium bromide    TAKE these medications     TAKE these medications     Morning Afternoon Evening Bedtime As Needed    ADVAIR DISKUS 250-50 mcg/dose inhaler   Generic drug: fluticasone-salmeterol   Inhale 1 puff 2 (two) times a day   Refills: 0            * albuterol (2 5 mg/3 mL) 0 083 % nebulizer solution   Inhale 1 each every 4 (four) hours as needed   Refills: 0           * albuterol 90 mcg/act inhaler   Commonly known as: PROVENTIL HFA,VENTOLIN HFA   Inhale 2 puffs every 6 (six) hours as needed for wheezing   Refills: 0   Doctor's comments: Please substitute based on patient insurance           * amLODIPine 5 mg tablet   Commonly known as: NORVASC   Take 1 tablet (5 mg total) by mouth daily   Refills: 3           * amLODIPine 10 mg tablet   Commonly known as: NORVASC   TAKE ONE TABLET BY MOUTH ONE TIME DAILY   Refills: 5 finasteride 5 mg tablet   Commonly known as: PROSCAR   Take 5 mg by mouth daily   Refills: 0           fluticasone 50 mcg/act nasal spray   Commonly known as: FLONASE   2 sprays into each nostril daily   Refills: 5           ipratropium-albuterol 0 5-2 5 mg/3 mL nebulizer solution   Commonly known as: DUO-NEB   Take 1 vial (3 mL total) by nebulization 4 (four) times a day   Refills: 0              meclizine 12 5 MG tablet   Commonly known as: ANTIVERT   Take 1 tablet by mouth daily   Refills: 0           omeprazole 40 MG capsule   Commonly known as: PriLOSEC   TAKE ONE CAPSULE BY MOUTH TWICE DAILY   Refills: 5            predniSONE 10 mg tablet   60mg QD x3 days, 40mg QD x3 days, 20mg QD x3 days, 10mg QD x3 days, 5mg (1/2 tablet) QD x3 days   Refills: 0             sucralfate 1 g/10 mL suspension   Commonly known as: CARAFATE   Take 10 mL (1 g total) by mouth 4 (four) times a day   Refills: 5              tamsulosin 0 4 mg   Commonly known as: FLOMAX   TAKE ONE CAPSULE BY MOUTH ONE TIME DAILY   Refills: 5           theophylline 400 MG 24 hr capsule   Commonly known as: ESTEFANIA-24   Take 1 capsule (400 mg total) by mouth daily   Refills: 3           * ZEMAIRA IV   Infuse into a venous catheter once a week On tuesday -1000mg /50 ml   Refills: 0          * ZEMAIRA infusion   Generic drug: alpha1-proteinase inhibitor   Refills: 0          zolpidem 10 mg tablet   Commonly known as: AMBIEN   TAKE ONE TABLET BY MOUTH AT BEDTIME   Refills: 5   What changed: Another medication with the same name was removed  Continue taking this medication, and follow the directions you see here                  Allergies  Allergies   Allergen Reactions    Chantix [Varenicline]      Caused prostate infection       Diet restrictions: none  Activity restrictions: none  Code Status: Level 1 - Full Code  Advance Directive and Living Will: <no information>  Power of :    POLST:      Discharge Condition: stable      Discharge  Statement I spent 30 minutes discharging the patient  This time was spent on the day of discharge  I had direct contact with the patient on the day of discharge  Additional documentation is required if more than 30 minutes were spent on discharge

## 2018-10-17 NOTE — SOCIAL WORK
PT TO D/C TO HOME, REFERRAL ORIGINALLY MADE TO [facility], PER PT DAUGHTER, PREFERS [facility]  CM CANCELLED [facility] , REFERRAL MADE TO [facility], D/C INSTRUCTIONS SENT VIA [employer]  PT AGREEABLE WITH DCP

## 2018-10-17 NOTE — PROGRESS NOTES
Progress Note - Pulmonary   Austen Dorantes 75 y o  male MRN: [id number]  Unit/Bed#: [address] [facility] Encounter: [id number]      Assessment/Plan:   1  Acute COPD Exacerbation with chronic respiratory failure with hypoxia: Last FEV2 was 40% predicted  - Improving clinically, solumedrol was decreased to 20q8h yesterday, plan for prednisone taper on discharge  C/w duo nebs qid hold incruse ellipta while on duonebs  Resume home advair on discharge  Follow up with Pulmonology outpt in 1 week  Pt continued on home 3L NC O2, saturating well in the 90s%  Home O2 eval: continue with NC O2 3L at rest and 4L with exercise  Continue with duo-neb qid and Breo 1 puff daily during hospital stay  Lungs clear to auscultation today, no wheezes appreciated  Continue with solumedrol 20 q8h today  Home advair, incruse ellipta held during hospital admission   Home VNA services discussed with case managment, pt agreeable      2  Pulmonary lung nodules:  As seen on CT 10/11/18: Several small pulmonary nodules are again visualized   Right upper lobe nodule measuring 8 mm is unchanged   4 mm right lower lobe nodule is noted   2 mm left lower lobe nodule is unchanged   Several adjacent triangular-shaped right lower lobe   Visual nodules are again visualized  Sandy Dubin is seen in the right lower lobe  Alexander Fonarik is a new left upper lobe pulmonary nodule measuring 5 mm   -Repeat outpatient CT Chest in 6 months     3  Alpha 1-antitrypsin deficiency ([facility])  Pt on weekly zemaira  Last infusion was on 10/9, next scheduled infusion on 10/18 outpt          ______________________________________________________________________    Subjective: Pt seen and examined at bedside  No acute events noted overnight  Pt states he feels well  Breathing is at baseline, still with dyspnea with exertion        Vitals:   Temp:  [98 5 °F (36 9 °C)-98 6 °F (37 °C)] 98 6 °F (37 °C)  HR:  [72-76] 76  Resp:  [18-19] 18  BP: (144-152)/(68-79) 152/72  Weight (last 2 days)     None Oxygen Therapy  SpO2: 99 %  O2 Flow Rate (L/min): 3 L/min    Nutrition:        Diet Orders            Start     Ordered    10/12/18 0838  Room Service  Once     Question:  Type of Service  Answer:  Room Service-Appropriate    10/12/18 4090    10/11/18 2357  Diet Regular; Regular House  Diet effective now     Question Answer Comment   Diet Type Regular    Regular Regular House    RD to adjust diet per protocol? Yes        10/11/18 2356          Ins/Outs:   I/O       10/15 0701 - 10/16 0700 10/16 0701 - 10/17 0700 10/17 0701 - 10/18 0700    P  O  240      I V  (mL/kg) 10 (0 1)      Total Intake(mL/kg) 250 (3 2)      Urine (mL/kg/hr) 650 (0 3) 400 (0 2)     Total Output 650 400      Net -400 -400             Unmeasured Stool Occurrence 1 x            Lines/Drains:  Invasive Devices     Peripheral Intravenous Line            Peripheral IV 10/16/18 Left Forearm 1 day                 Active medications:  Scheduled Meds:  Current Facility-Administered Medications:  amLODIPine 10 mg Oral Daily Debbie Oakley MD   enoxaparin 40 mg Subcutaneous Daily Debbie Oakley MD   famotidine 40 mg Oral HS Michaelle Waller PA-C   fluticasone-vilanterol 1 puff Inhalation Daily Debbie Oakley MD   ipratropium-albuterol 3 mL Nebulization Q2H PRN Debbie Oakley MD   ipratropium-albuterol 3 mL Nebulization Q6H Iggy Lu MD   methylPREDNISolone sodium succinate 20 mg Intravenous Q8H [address] Gina Choudhary DO   ondansetron 4 mg Intravenous Q6H PRN Debbie Oakley MD   pantoprazole 40 mg Oral BID Michaelle Waller PA-C   simethicone 80 mg Oral 4x Daily PRN Debbie Oakley MD   sucralfate 1,000 mg Oral 4x Daily (AC & HS) Debbie Oakley MD   tamsulosin 0 4 mg Oral Daily Debbie Oakley MD   zolpidem 10 mg Oral HS PRN Debbie Oakley MD     PRN Meds:  ipratropium-albuterol 3 mL Q2H PRN   ondansetron 4 mg Q6H PRN   simethicone 80 mg 4x Daily PRN   zolpidem 10 mg HS PRN ____________________________________________________________________      Physical Exam   Constitutional: He is oriented to person, place, and time  He appears well-developed and well-nourished  No distress  Cardiovascular: Normal rate  Pulmonary/Chest: Effort normal and breath sounds normal  No respiratory distress  He has no wheezes  On 3L NC O2  Minimal conversational dyspnea   Abdominal: Soft  Musculoskeletal: He exhibits no edema  Neurological: He is alert and oriented to person, place, and time  Skin: Skin is warm and dry  He is not diaphoretic  Nursing note and vitals reviewed  ____________________________________________________________________    Labs:   CBC:   Results from last 7 days  Lab Units 10/17/18  0650 10/16/18  0653 10/15/18  0637   WBC Thousand/uL 8 69 7 97 8 02   HEMOGLOBIN g/dL 13 4 13 3 13 1   HEMATOCRIT % 39 0 39 0 39 0   MCV fL 95 95 95   PLATELETS Thousands/uL 189 201 180     CMP:   Results from last 7 days  Lab Units 10/17/18  0650 10/16/18  0653 10/15/18  0637  10/11/18  1839   SODIUM mmol/L 137 140 139  < > 141   POTASSIUM mmol/L 4 3 4 3 4 1  < > 3 3*   CHLORIDE mmol/L 103 104 104  < > 102   CO2 mmol/L 25 26 26  < > 26   BUN mg/dL 33* 32* 26*  < > 18   CREATININE mg/dL 0 94 1 07 1 00  < > 1 24   CALCIUM mg/dL 8 5 8 6 8 6  < > 9 6   AST U/L  --   --   --   --  19   ALT U/L  --   --   --   --  36   ALK PHOS U/L  --   --   --   --  71   EGFR ml/min/1 73sq m 80 68 74  < > 57   < > = values in this interval not displayed    Magnesium:   Results from last 7 days  Lab Units 10/17/18  0650   MAGNESIUM mg/dL 2 1     Phosphorous:   Results from last 7 days  Lab Units 10/17/18  0650   PHOSPHORUS mg/dL 3 8     Troponin:   Results from last 7 days  Lab Units 10/11/18  1839   TROPONIN I ng/mL <0 02     PT/INR:   Results from last 7 days  Lab Units 10/11/18  1839   PTT seconds 27   INR  1 03     Lactic Acid:     BNP:     TSH:     Procalcitonin: Invalid input(s): PROCALCITONIN      Imaging:   US abdomen limited   Final Result by Andrea Fry MD (56/49 9036)      1  No acute abnormality  2   Hepatomegaly with moderately increased liver echotexture likely representing hepatic steatosis  Workstation performed: [id number]         XR chest 1 view portable   Final Result by Khushbu Carbone MD (10/11 2151)      Left basilar streaky density possibly subsegmental atelectasis  Workstation performed: [id number]         CTA chest ct abdomen pelvis w contrast   Final Result by Eliane Michelle DO (10/11 2053)      No evidence of pulmonary embolus      Several small pulmonary nodules  New left upper lobe pulmonary nodule  Follow-up CT scan in 3-6 months is recommended  The study was marked in EPIC for significant notification  Workstation performed: [id number]                 Micro: Lab Results   Component Value Date    BLOODCX No Growth After 5 Days  12/29/2017    BLOODCX No Growth After 5 Days  12/29/2017    MRSACULTURE (A) 10/11/2018     Methicillin Resistant Staphylococcus aureus isolated    MRSACULTURE  10/11/2018     Please note: This patient requires contact precautions              Invalid input(s): LEGIONELLAURINARYANTIGEN        Code Status: Level 1 - Full Code

## 2018-10-17 NOTE — NURSING NOTE
After visit summary reviewed with patient  New medications verified  IV removed and all belongings accounted for  Patient hooked up to home oxygen equipment  Patient left unit by wheelchair, accompanied by a PCA and left the unit at 1415

## 2018-10-17 NOTE — OCCUPATIONAL THERAPY NOTE
OT TREATMENT       10/17/18 1138   Restrictions/Precautions   Other Precautions O2;Fall Risk   Pain Assessment   Pain Assessment No/denies pain   ADL   Grooming Assistance 4  Minimal Assistance   Grooming Deficit Wash/dry hands; Wash/dry face;Brushing hair   Functional Standing Tolerance   Time 4 minutes   Activity ADL at sink   Bed Mobility   Supine to Sit 7  Independent   Transfers   Sit to Stand 5  Supervision   Stand to Sit 5  Supervision   Stand pivot 5  Supervision   Additional items Verbal cues   Functional Mobility   Functional Mobility 4  Minimal assistance   Additional Comments 40 feet, knee buckled once, cues needed to manage 50 foot O2 line   Additional items Rolling walker   Therapeutic Excerise-Strength   UE Strength Yes   Right Upper Extremity- Strength   R Shoulder Flexion; Extension   R Elbow Elbow flexion;Elbow extension   R Wrist Wrist flexion;Wrist extension   R Position Seated   R Weight/Reps/Sets 10 reps each   Left Upper Extremity-Strength   L Shoulder Flexion; Extension   L Elbow Elbow flexion;Elbow extension   L Wrist Wrist flexion;Wrist extension   L Position Seated   L Weights/Reps/Sets 10 reps each   Cognition   Overall Cognitive Status WFL   Activity Tolerance   Activity Tolerance Patient limited by fatigue  (Limited by SOB)   Assessment   Assessment Pt received SOB at rest despite 3L continuous O2 via NC, SpO2 96%  Did well ambulating in room to obtain ADL supplies with Supervision and RW, but knees buckled one time, requiring assist of 1 to prevent fall  Education and training provided to pt regarding how to manage 25 foot O2 line better to prevent it from getting caught on obstacles and to decrease fall risks with good understanding from pt  Pt able to demonstrate afterwards with min cueing  Patient left OOB in chair with all needs within reach, tab alarm in place  Cont OT per POC      Plan   Treatment Interventions ADL retraining;Functional transfer training;UE strengthening/ROM; Endurance training;Patient/family training;Equipment evaluation/education; Activityengagement; Energy conservation; Compensatory technique education   Treatment Day 2   OT Frequency 3-5x/wk   Recommendation   OT Discharge Recommendation (home with services)   Licensure   [place] License Number  [address], OTR/L, [place] Lic# [id number]

## 2018-10-17 NOTE — PLAN OF CARE
Problem: OCCUPATIONAL THERAPY ADULT  Goal: Performs self-care activities at highest level of function for planned discharge setting  See evaluation for individualized goals  Outcome: Progressing  Limitation: Decreased ADL status, Decreased UE strength, Decreased Safe judgement during ADL, Decreased endurance, Decreased self-care trans, Decreased high-level ADLs (decreased balance and mobility )  Prognosis: Good  Assessment: Pt received SOB at rest despite 3L continuous O2 via NC, SpO2 96%  Did well ambulating in room to obtain ADL supplies with Supervision and RW, but knees buckled one time, requiring assist of 1 to prevent fall  Education and training provided to pt regarding how to manage 25 foot O2 line better to prevent it from getting caught on obstacles and to decrease fall risks with good understanding from pt  Pt able to demonstrate afterwards with min cueing  Patient left OOB in chair with all needs within reach, tab alarm in place  Cont OT per POC        OT Discharge Recommendation:  (home with services)

## 2018-10-19 ENCOUNTER — PATIENT OUTREACH (OUTPATIENT)
Dept: CASE MANAGEMENT | Facility: HOSPITAL | Age: 73
End: 2018-10-19

## 2018-10-19 NOTE — PROGRESS NOTES
Communication:  Date of Discharge:10/17/2018  30 Day Readmission: no    Identified Risk for Readmission Per Risk Stratification: High Risk  Current Disposition: Home with [facility]    FEV1 %: 40    Gold Stage of COPD: Severe  History   Smoking Status    Former Smoker    Packs/day: 1 00    Years: 60 00    Quit date: 8/31/2017   Smokeless Tobacco    Never Used     Comment: quit in august 2017        Symptoms:  Wheezing: No  Cough: Yes  Difficulty Breathing (Dyspnea): Yes  Fever: No  Sputum Expectoration: No      Oxygenation:  Pulse Oximetry: 93    Oxygen therapy at 3 LPM  Room air: No  Oxygen Used: All the time    Zone Tool Status: Yellow  Follow up Appointments: PCP: 10/25/2018, Pulm 10/23/2018 * Pt drives himself to MD appts     Counseling and Topics Reviewed:  Review of DME Oxygen    Did you feel ready to be discharged from the hospital? Yes        Were you given the opportunity to walk in the hallways with staff assistance to assess your breathing? Yes    Were you instructed on when to return to your normal activities (ie: work, social activities, etc)? No    Did staff offer to assist you in making follow-up appointments with your family doctor or other specialists? Yes    Narrative Summary: (ie: respiratory status, sputum production, persistent wheezing or coughing)  Marycarmen Duval is still quite SOB with conversation  He is wearing O2 @ 3L and reports spO2 lvls at 93-94%  [facility] RN first visit yesterday, he reports he will also be starting home PT  Appts for followup made for next week  Marycarmen Duval thinks daughter will be getting him some groceries over the weekend, as he does not think he can navigate a grocery store right now  I will check in with him next week and assess whether a referral to the [address] [place] [address]  would be beneficia

## 2018-10-22 ENCOUNTER — TRANSITIONAL CARE MANAGEMENT (OUTPATIENT)
Dept: FAMILY MEDICINE CLINIC | Facility: CLINIC | Age: 73
End: 2018-10-22

## 2018-10-23 ENCOUNTER — OFFICE VISIT (OUTPATIENT)
Dept: PULMONOLOGY | Facility: MEDICAL CENTER | Age: 73
End: 2018-10-23
Payer: MEDICARE

## 2018-10-23 VITALS
DIASTOLIC BLOOD PRESSURE: 82 MMHG | HEIGHT: 77 IN | WEIGHT: 180 LBS | TEMPERATURE: 98.6 F | SYSTOLIC BLOOD PRESSURE: 160 MMHG | RESPIRATION RATE: 18 BRPM | BODY MASS INDEX: 21.25 KG/M2 | HEART RATE: 94 BPM | OXYGEN SATURATION: 97 %

## 2018-10-23 DIAGNOSIS — R91.8 LUNG NODULES: ICD-10-CM

## 2018-10-23 DIAGNOSIS — J96.11 CHRONIC RESPIRATORY FAILURE WITH HYPOXIA (HCC): Primary | ICD-10-CM

## 2018-10-23 DIAGNOSIS — J44.9 CHRONIC OBSTRUCTIVE PULMONARY DISEASE, UNSPECIFIED COPD TYPE (HCC): ICD-10-CM

## 2018-10-23 DIAGNOSIS — B37.0 ORAL CANDIDIASIS: ICD-10-CM

## 2018-10-23 DIAGNOSIS — E88.01 AAT (ALPHA-1-ANTITRYPSIN) DEFICIENCY (HCC): ICD-10-CM

## 2018-10-23 DIAGNOSIS — J43.2 CENTRILOBULAR EMPHYSEMA (HCC): ICD-10-CM

## 2018-10-23 PROCEDURE — 99214 OFFICE O/P EST MOD 30 MIN: CPT | Performed by: NURSE PRACTITIONER

## 2018-10-23 RX ORDER — ALBUTEROL SULFATE 90 UG/1
2 AEROSOL, METERED RESPIRATORY (INHALATION) 4 TIMES DAILY
Qty: 1 INHALER | Refills: 5 | Status: SHIPPED | OUTPATIENT
Start: 2018-10-23 | End: 2019-11-29 | Stop reason: SDUPTHER

## 2018-10-23 RX ORDER — CLOTRIMAZOLE 10 MG/1
10 LOZENGE ORAL; TOPICAL 4 TIMES DAILY
Qty: 20 TABLET | Refills: 0 | Status: SHIPPED | OUTPATIENT
Start: 2018-10-23 | End: 2018-10-28

## 2018-10-23 RX ORDER — IPRATROPIUM BROMIDE AND ALBUTEROL SULFATE 2.5; .5 MG/3ML; MG/3ML
3 SOLUTION RESPIRATORY (INHALATION) 3 TIMES DAILY
Qty: 270 ML | Refills: 5 | Status: SHIPPED | OUTPATIENT
Start: 2018-10-23 | End: 2019-07-22 | Stop reason: SDUPTHER

## 2018-10-23 NOTE — ASSESSMENT & PLAN NOTE
Jorgebrandon Barbykatelyn has a history of lung nodule seen on CT of chest   He had CT a of chest abdomen and pelvis with contrast while hospitalized  There was no evidence of pulmonary embolism  There were several stable small pulmonary nodules and a new left upper lobe pulmonary nodule that measured 5 mm  Patient has high risk as he was a former smoker  Repeat CT of chest will be done in 6 months

## 2018-10-23 NOTE — ASSESSMENT & PLAN NOTE
Alexandrea Cornea has evidence of oral candidiasis  He has been on a high tapering dose of oral prednisone and was recently hospitalized for COPD exacerbation and was given IV steroids  I am ordering clotrimazole troches shows 4 times daily for 5 days  If he develops stomach upset, he can limit this to 3 days

## 2018-10-23 NOTE — PATIENT INSTRUCTIONS
Continue year current dose of oral prednisone  I have ordered for you a rescue inhaler  Additionally I have asked that year [employer] company supply you with a portable concentrator  You did requalify for supplemental oxygen  U will have a repeat CAT scan a year chest in 6 months  There were small pulmonary nodules which need to be followed  Continue using your Advair 250/51 puff twice per day  Of using in cruise I want you to use your nebulizer with new medication called Duo nebs    Uses at least twice daily and this will take the place of the in cruise

## 2018-10-23 NOTE — ASSESSMENT & PLAN NOTE
Patient had recent admission to hospital for COPD exacerbation  He also has chronic hypoxemia  His PFTs in the past have revealed FEV1 of 1 61 L or 40% of predicted  Centrilobular emphysema is seen on CT of chest   He also has alpha 1 antitrypsin deficiency  Plan includes continuation of Advair 250/51 puff b i d  And he is agreeable to use Duo nebs which he should use it minimally 3 times daily  Order this  He is currently on tapering dose of prednisone and he is slowly tapering  He does appear to be stable

## 2018-10-23 NOTE — ASSESSMENT & PLAN NOTE
Toshia Tracy has chronic respiratory failure with hypoxia  Room air oxygen at rest was 92% and with ambulation was 87%  He did walk with 3 L of supplemental oxygen and at rest was 96%  He is using an benefitting from oxygen and should continue supplemental oxygen particularly with any ambulation  Portable oxygen tanks are causing additional adverse medical outcomes in patient's breathing that does not allow for adequate ambulation  Patient will benefit from usage of a POCvia nasal cannula  I will have [employer], evaluate and supply patient with a POC at a setting of continous  O2 saturation on room air at rest 93% O2 saturation on room air on ambulation 87%, O2 saturation on 2L/M on ambulation 96% via nasal cannula  Portable oxygen tanks would cause additional adverse medical outcomes in patients breathing that does not allow for adequate ambulation

## 2018-10-23 NOTE — PROGRESS NOTES
Assessment/Plan:     Problem List Items Addressed This Visit        Digestive    AAT (alpha-1-antitrypsin) deficiency ([facility] )     Patient does have alpha 1 antitrypsin deficiency  He receives weekly IV infusion of Zemaira  Relevant Medications    ipratropium-albuterol (DUO-NEB) 0 5-2 5 mg/3 mL nebulizer solution    albuterol (PROVENTIL HFA,VENTOLIN HFA) 90 mcg/act inhaler    Oral candidiasis     Camilo has evidence of oral candidiasis  He has been on a high tapering dose of oral prednisone and was recently hospitalized for COPD exacerbation and was given IV steroids  I am ordering clotrimazole troches shows 4 times daily for 5 days  If he develops stomach upset, he can limit this to 3 days  Relevant Medications    clotrimazole (MYCELEX) 10 mg jabari       Respiratory    Chronic respiratory failure with hypoxia ([facility] ) - Primary     Camilo has chronic respiratory failure with hypoxia  Room air oxygen at rest was 92% and with ambulation was 87%  He did walk with 3 L of supplemental oxygen and at rest was 96%  He is using an benefitting from oxygen and should continue supplemental oxygen particularly with any ambulation  Portable oxygen tanks are causing additional adverse medical outcomes in patient's breathing that does not allow for adequate ambulation  Patient will benefit from usage of a POCvia nasal cannula  I will have [employer], evaluate and supply patient with a POC at a setting of continous  O2 saturation on room air at rest 93% O2 saturation on room air on ambulation 87%, O2 saturation on 2L/M on ambulation 96% via nasal cannula  Portable oxygen tanks would cause additional adverse medical outcomes in patients breathing that does not allow for adequate ambulation  Relevant Orders    Home Oxygen with Portability    Centrilobular emphysema ([facility] )     Patient had recent admission to hospital for COPD exacerbation  He also has chronic hypoxemia    His PFTs in the past have revealed FEV1 of 1 61 L or 40% of predicted  Centrilobular emphysema is seen on CT of chest   He also has alpha 1 antitrypsin deficiency  Plan includes continuation of Advair 250/51 puff b i d  And he is agreeable to use Duo nebs which he should use it minimally 3 times daily  Order this  He is currently on tapering dose of prednisone and he is slowly tapering  He does appear to be stable  Relevant Medications    ipratropium-albuterol (DUO-NEB) 0 5-2 5 mg/3 mL nebulizer solution    albuterol (PROVENTIL HFA,VENTOLIN HFA) 90 mcg/act inhaler       Other    Lung nodules     Camilo has a history of lung nodule seen on CT of chest   He had CT a of chest abdomen and pelvis with contrast while hospitalized  There was no evidence of pulmonary embolism  There were several stable small pulmonary nodules and a new left upper lobe pulmonary nodule that measured 5 mm  Patient has high risk as he was a former smoker  Repeat CT of chest will be done in 6 months  Relevant Orders    CT chest without contrast      Other Visit Diagnoses     Chronic obstructive pulmonary disease, unspecified COPD type ([facility] )        Relevant Medications    ipratropium-albuterol (DUO-NEB) 0 5-2 5 mg/3 mL nebulizer solution    albuterol (PROVENTIL HFA,VENTOLIN HFA) 90 mcg/act inhaler          HPI:  Mr Cookie Torres is a 76-year-old male who was here today for hospital follow-up  He was admitted to [place] [address] on October 11th and discharged October 17th  His principal discharge diagnosis was chronic respiratory failure with hypoxia  This 76-year-old male has a past medical history of GERD lung nodules and alpha 1 antitrypsin deficiency  He was given IV steroids and was discharged on a long prednisone taper including 60 mg for 3 days 40 for 3 days 20 for 3 days 10 mg for 3 days and 5 mg for 3 days  He is currently on Duo nebs with nebulizer  He is using his nebulizer 4 times daily    He did have a CT of his chest showing several small pulmonary nodules there is a new left upper lobe pulmonary nodule for which follow-up CT will be done in 6 months  He was last seen in the office July 2, 2018  It is noted that he does have severe COPD  Last forced expiratory volume was 1 61 L or 40% of predicted  He is currently using his Advair 250/51 puff twice daily  He has not use the in cruise since using his new Duo nebs  He also has a history of GERD and it is noted that his the off Jefm Robel was discontinued as he was taking 400 mg once a day  Return in about 6 weeks (around 12/4/2018)  All questions are answered to the patient's satisfaction and understanding  He verbalizes understanding  He is encouraged to call with any further questions or concerns  Portions of the record may have been created with voice recognition software  Occasional wrong word or "sound a like" substitutions may have occurred due to the inherent limitations of voice recognition software  Read the chart carefully and recognize, using context, where substitutions have occurred  Electronically Signed by TOPHER Vilchis    ______________________________________________________________________    Chief Complaint:   Chief Complaint   Patient presents with   [facility]    Shortness of Breath       Patient ID: Amanda Roberts is a 75 y o  y o  male has a past medical history of Anesthesia complication; Arthritis; BPH (benign prostatic hyperplasia); Cancer [facility]); COPD (chronic obstructive pulmonary disease) ([facility] ); Full dentures; Hypertension; Kidney stone; Liver disease; Pulmonary emphysema ([facility] ); and Wears glasses  10/23/2018  Patient presents today for follow-up visit  Shortness of Breath   This is a chronic problem  The current episode started more than 1 year ago  The problem occurs daily  The problem has been waxing and waning  Associated symptoms include wheezing  Pertinent negatives include no abdominal pain   The symptoms are aggravated by exercise  The patient has no known risk factors for DVT/PE  He has tried ipratropium inhalers, rest and beta agonist inhalers for the symptoms  The treatment provided mild relief  His past medical history is significant for chronic lung disease  Review of Systems   Constitutional: Negative  HENT: Negative  Respiratory: Positive for shortness of breath and wheezing  Gastrointestinal: Negative for abdominal pain  Endocrine: Negative  Genitourinary: Negative  Musculoskeletal: Negative  Skin: Negative  Allergic/Immunologic: Negative  Neurological: Negative  Hematological: Negative  Psychiatric/Behavioral: Negative  Smoking history: He reports that he quit smoking about 13 months ago  He has a 60 00 pack-year smoking history   He has never used smokeless tobacco     The following portions of the patient's history were reviewed and updated as appropriate: current medications, past family history, past medical history, past social history, past surgical history and problem list     Immunization History   Administered Date(s) Administered    Influenza 09/26/2018    Influenza TIV (IM) 09/27/2013, 10/23/2014    Pneumococcal Conjugate 13-Valent 04/26/2016    Pneumococcal Polysaccharide PPV23 04/23/2011    Tdap 04/26/2016    Zoster 10/29/2014, 04/27/2015, 07/24/2018     Current Outpatient Prescriptions   Medication Sig Dispense Refill    albuterol (PROVENTIL HFA,VENTOLIN HFA) 90 mcg/act inhaler Inhale 2 puffs 4 (four) times a day 1 Inhaler 5    Alpha1-Proteinase Inhibitor (ZEMAIRA IV) Infuse into a venous catheter once a week On tuesday -1000mg /50 ml       amLODIPine (NORVASC) 10 mg tablet TAKE ONE TABLET BY MOUTH ONE TIME DAILY  60 tablet 5    finasteride (PROSCAR) 5 mg tablet Take 5 mg by mouth daily        fluticasone (FLONASE) 50 mcg/act nasal spray 2 sprays into each nostril daily 16 g 5    fluticasone-salmeterol (ADVAIR DISKUS) 250-50 mcg/dose inhaler Inhale 1 puff 2 (two) times a day        ipratropium-albuterol (DUO-NEB) 0 5-2 5 mg/3 mL nebulizer solution Take 1 vial (3 mL total) by nebulization 4 (four) times a day 60 vial 0    omeprazole (PriLOSEC) 40 MG capsule TAKE ONE CAPSULE BY MOUTH TWICE DAILY  60 capsule 5    predniSONE 10 mg tablet 60mg QD x3 days, 40mg QD x3 days, 20mg QD x3 days, 10mg QD x3 days, 5mg (1/2 tablet) QD x3 days 42 tablet 0    sucralfate (CARAFATE) 1 g/10 mL suspension Take 10 mL (1 g total) by mouth 4 (four) times a day 420 mL 5    tamsulosin (FLOMAX) 0 4 mg TAKE ONE CAPSULE BY MOUTH ONE TIME DAILY  30 capsule 5    theophylline (ESTEFANIA-24) 400 MG 24 hr capsule Take 1 capsule (400 mg total) by mouth daily 90 capsule 3    ZEMAIRA injection       zolpidem (AMBIEN) 10 mg tablet TAKE ONE TABLET BY MOUTH AT BEDTIME  30 tablet 5    albuterol (2 5 mg/3 mL) 0 083 % nebulizer solution Inhale 1 each every 4 (four) hours as needed      amLODIPine (NORVASC) 5 mg tablet Take 1 tablet (5 mg total) by mouth daily (Patient not taking: Reported on 10/2/2018 ) 30 tablet 3    clotrimazole (MYCELEX) 10 mg jabari Take 1 tablet (10 mg total) by mouth 4 (four) times a day for 5 days 20 tablet 0    ipratropium-albuterol (DUO-NEB) 0 5-2 5 mg/3 mL nebulizer solution Take 1 vial (3 mL total) by nebulization 3 (three) times a day 270 mL 5    meclizine (ANTIVERT) 12 5 MG tablet Take 1 tablet by mouth daily       No current facility-administered medications for this visit  Allergies: Chantix [varenicline]    Objective:  Vitals:    10/23/18 1445   BP: 160/82   BP Location: Left arm   Patient Position: Sitting   Cuff Size: Standard   Pulse: 94   Resp: 18   Temp: 98 6 °F (37 °C)   TempSrc: Tympanic   SpO2: 97%   Weight: 81 6 kg (180 lb)   Height: 6' 5" (1 956 m)   Oxygen Therapy  SpO2: 97 % (o2 3 LM)      Wt Readings from Last 3 Encounters:   10/23/18 81 6 kg (180 lb)   10/11/18 78 4 kg (172 lb 13 5 oz)   10/02/18 82 6 kg (182 lb)     Body mass index is 21 34 kg/m²  Physical Exam   Constitutional: He is oriented to person, place, and time  He appears well-developed and well-nourished  HENT:   Head: Normocephalic and atraumatic  Mouth/Throat: Oropharyngeal exudate present  Mallampati 1  Whitish patches on bilateral aspects of time   Eyes: Pupils are equal, round, and reactive to light  Neck: Normal range of motion  No tracheal deviation present  Cardiovascular: Normal rate and regular rhythm  Pulmonary/Chest: Effort normal and breath sounds normal    Abdominal: Soft  Musculoskeletal: Normal range of motion  Neurological: He is alert and oriented to person, place, and time  Skin: Skin is warm and dry  Psychiatric: He has a normal mood and affect  His behavior is normal  Thought content normal        Lab Review:   Admission on 10/11/2018, Discharged on 10/17/2018   No results displayed because visit has over 200 results        Appointment on 06/12/2018   Component Date Value    Sodium 06/12/2018 141     Potassium 06/12/2018 4 2     Chloride 06/12/2018 104     CO2 06/12/2018 28     ANION GAP 06/12/2018 9     BUN 06/12/2018 11     Creatinine 06/12/2018 1 25     Glucose, Fasting 06/12/2018 112*    Calcium 06/12/2018 9 4     eGFR 06/12/2018 56     WBC 06/12/2018 6 38     RBC 06/12/2018 4 33     Hemoglobin 06/12/2018 13 8     Hematocrit 06/12/2018 41 6     MCV 06/12/2018 96     MCH 06/12/2018 31 9     MCHC 06/12/2018 33 2     RDW 06/12/2018 13 2     MPV 06/12/2018 9 5     Platelets 16/71/7051 228     nRBC 06/12/2018 0     Neutrophils Relative 06/12/2018 56     Immat GRANS % 06/12/2018 1     Lymphocytes Relative 06/12/2018 28     Monocytes Relative 06/12/2018 9     Eosinophils Relative 06/12/2018 5     Basophils Relative 06/12/2018 1     Neutrophils Absolute 06/12/2018 3 63     Immature Grans Absolute 06/12/2018 0 03     Lymphocytes Absolute 06/12/2018 1 79     Monocytes Absolute 06/12/2018 0 56     Eosinophils Absolute 06/12/2018 0 30     Basophils Absolute 06/12/2018 0 07    Appointment on 05/17/2018   Component Date Value    PSA, Diagnostic 05/17/2018 4 8*       Diagnostics:  I have personally reviewed pertinent reports  Office Spirometry Results:     ESS:    Xr Chest 1 View Portable    Result Date: 10/11/2018  Narrative: CHEST INDICATION:   Shortness of breath  COMPARISON:  None EXAM PERFORMED/VIEWS:  XR CHEST PORTABLE FINDINGS: Cardiomediastinal silhouette appears unremarkable  Left basilar streaky density possibly subsegmental atelectasis  No pneumothorax  No pleural effusion  Osseous structures appear within normal limits for patient age  Impression: Left basilar streaky density possibly subsegmental atelectasis  Workstation performed: [id number]     Cta Chest Ct Abdomen Pelvis W Contrast    Result Date: 10/11/2018  Narrative: CT PULMONARY ANGIOGRAM OF THE CHEST AND CT ABDOMEN AND PELVIS WITH INTRAVENOUS CONTRAST INDICATION:   dyspnea, history of PE  COMPARISON:  June 12, 2018 TECHNIQUE:  CT examination of the chest, abdomen and pelvis was performed  Thin section CT angiographic technique was used in the chest in order to evaluate for pulmonary embolus and coronal 3D MIP postprocessing was performed on the acquisition scanner  Axial, sagittal, and coronal 2D reformatted images were created from the source data and submitted for interpretation  Radiation dose length product (DLP) for this visit:  891 41 mGy-cm   This examination, like all CT scans performed in the [facility], was performed utilizing techniques to minimize radiation dose exposure, including the use of iterative  reconstruction and automated exposure control  IV Contrast:  100 mL of iohexol (OMNIPAQUE) Enteric Contrast:  Enteric contrast was not administered  FINDINGS: CHEST PULMONARY ARTERIAL TREE:  No pulmonary embolus is seen  LUNGS:  Emphysema is again noted  Several small pulmonary nodules are again visualized    Right upper lobe nodule measuring 8 mm is unchanged  4 mm right lower lobe nodule is noted  2 mm left lower lobe nodule is unchanged  Several adjacent triangular-shaped right lower lobe  Visual nodules are again visualized  Bulla is seen in the right lower lobe  There is a new left upper lobe pulmonary nodule measuring 5 mm (series 4 image 18)  PLEURA:  Unremarkable  HEART/AORTA:  Unremarkable for patient's age  MEDIASTINUM AND MIKE:  Small hiatal hernia is seen  Chronic subsegmental esophageal dilatation is again visualized  CHEST WALL AND LOWER NECK:   Unremarkable  ABDOMEN LIVER/BILIARY TREE:  Unremarkable  GALLBLADDER:  No calcified gallstones  No pericholecystic inflammatory change  SPLEEN:  Unremarkable  PANCREAS:  Unremarkable  ADRENAL GLANDS:  Unremarkable  KIDNEYS/URETERS:  One or more simple renal cyst(s) is noted  Otherwise unremarkable kidneys  No hydronephrosis  STOMACH AND BOWEL:  There is colonic diverticulosis without evidence of acute diverticulitis  APPENDIX:  No findings to suggest appendicitis  ABDOMINOPELVIC CAVITY:  No ascites or free intraperitoneal air  No lymphadenopathy  VESSELS:  Atherosclerotic changes are present  No evidence of aneurysm  PELVIS REPRODUCTIVE ORGANS:  Unremarkable for patient's age  URINARY BLADDER:  Unremarkable  ABDOMINAL WALL/INGUINAL REGIONS:  Subcentimeter inguinal lymph nodes are visualized  OSSEOUS STRUCTURES:  Degenerative changes in the spine are seen  Impression: No evidence of pulmonary embolus Several small pulmonary nodules  New left upper lobe pulmonary nodule  Follow-up CT scan in 3-6 months is recommended  The study was marked in EPIC for significant notification  Workstation performed: [id number]     Us Abdomen Limited    Result Date: 10/16/2018  Narrative: RIGHT UPPER QUADRANT ULTRASOUND INDICATION:    n/v abd pain   COMPARISON:  CT from 10/11/2018 TECHNIQUE:   Real-time ultrasound of the right upper quadrant was performed with a curvilinear transducer with both volumetric sweeps and still imaging techniques  FINDINGS: PANCREAS:  Visualized portions of the pancreas are within normal limits  AORTA AND IVC:  Visualized portions are normal for patient age  LIVER: Size:  Mildly enlarged  The liver measures 17 2 cm in the midclavicular line  Contour:  Surface contour is smooth  Parenchyma: There is moderate diffuse increased echogenicity with smooth echotexture and acoustic beam attenuation  Most consistent with moderate hepatic steatosis  No evidence of suspicious mass  Limited imaging of the main portal vein shows it to be patent and hepatopetal   BILIARY: The gallbladder is normal in caliber  No wall thickening or pericholecystic fluid  No stones or sludge identified  No sonographic Perez's sign  No intrahepatic biliary dilatation  CBD measures 4 mm  No choledocholithiasis  KIDNEY: Right kidney measures 10 8 x 4 3 cm  Within normal limits  ASCITES:   None  Impression: 1  No acute abnormality  2   Hepatomegaly with moderately increased liver echotexture likely representing hepatic steatosis   Workstation performed: [id number]

## 2018-10-24 ENCOUNTER — OFFICE VISIT (OUTPATIENT)
Dept: HEMATOLOGY ONCOLOGY | Facility: MEDICAL CENTER | Age: 73
End: 2018-10-24
Payer: MEDICARE

## 2018-10-24 VITALS
RESPIRATION RATE: 18 BRPM | WEIGHT: 180 LBS | DIASTOLIC BLOOD PRESSURE: 100 MMHG | HEART RATE: 90 BPM | OXYGEN SATURATION: 94 % | HEIGHT: 77 IN | SYSTOLIC BLOOD PRESSURE: 200 MMHG | TEMPERATURE: 98.1 F | BODY MASS INDEX: 21.25 KG/M2

## 2018-10-24 DIAGNOSIS — I27.82 OTHER CHRONIC PULMONARY EMBOLISM WITH ACUTE COR PULMONALE (HCC): ICD-10-CM

## 2018-10-24 DIAGNOSIS — I26.09 OTHER CHRONIC PULMONARY EMBOLISM WITH ACUTE COR PULMONALE (HCC): ICD-10-CM

## 2018-10-24 DIAGNOSIS — R91.8 LUNG NODULES: Primary | ICD-10-CM

## 2018-10-24 PROCEDURE — 99214 OFFICE O/P EST MOD 30 MIN: CPT | Performed by: INTERNAL MEDICINE

## 2018-10-24 NOTE — PROGRESS NOTES
Mauro Brantley [facility]  1945  [address] [facility] [id number]    DISCUSSION  SUMMARY:    58-year-old male recently  the hospital with chest pain  Workup demonstrated an acute pulmonary emboli within the distal left main pulmonary artery and proximal left lower lobe segmental branches  Issues:    1  Pulmonary embolism  Patient feels relatively well, baseline  Shortness of breath and dyspnea on exertion is the same as before  Patient continues with the oxygen  Follow-up CTA demonstrated resolution of the PEs ;   [facility] is off anticoagulation (completed 6 months of Eliquis)  We rediscussed what to monitor for in regard to acute shortness of breath, chest pain, cough, hemoptysis  Patient understands that if he should have another PE, he will require anticoagulation for life  2  Pulmonary nodules, emphysema  Pulmonary follow-up is ongoing  Repeat CT scan demonstrated stable disease  3  Alpha-1 antitrypsin deficiency  Surveillance is ongoing  Patient is on weekly IV alpha 1 proteinase inhibitor  4, Urinary issues  Urology follow-up is ongoing  5  Rectal bleeding  Patient has a history of hemorrhoids  GI evaluation is pending  Recent hemoglobin level was good/acceptable  We discussed what to monitor for in regard to progressive anemia  Patient is seen by Pulmonary, his PCP and Urology routinely; has routine blood tests ordered    [facility] also has an appointment with Dr Yola Andrew  Patient was given a hematology office visit follow-up for 6 months;   [facility] knows that he can come in earlier if he has any other hematology issues or concerns  Carefully review your medication list and verify that the list is accurate and up-to-date   Please call the hematology/oncology office if there are medications missing from the list, medications on the list that you are not currently taking or if there is a dosage or instruction that is different from how you're taking that medication  Patient goals and areas of care: continue with anticoagulation  Patient is able to self-care   _____________________________________________________________________________________    Chief Complaint   Patient presents with    Follow-up     PE, alpha 1 antitrypsin deficiency     History of Present Illness:    21-year-old male recently admitted to the hospital with chest pain  Workup demonstrated PE  Patient was started on anticoagulation - completed 6 months of Eliquis (August 2018)  Patient returns for follow-up  Patient states feeling okay, baseline  Shortness of breath and dyspnea on exertion is the same as before  Patient requires oxygen 24/7  No chest pain or pressure  Dry cough is the same as before, no sputum or hemoptysis  No fevers or signs of infection  Appetite is okay, weight is stable  Activities are limited but no different than before  Patient has had GI bleeding in the past and recently saw blood in his stools  GI evaluation is pending  Patient also has a history of difficulty voiding and is followed by Urology  No recent acute  issues, hematuria, pain etc     Review of Systems   Constitutional: Negative  HENT: Negative  Eyes: Negative  Respiratory: Positive for cough and shortness of breath  Cardiovascular: Negative  Gastrointestinal: Positive for blood in stool  Endocrine: Negative  Genitourinary: Positive for difficulty urinating and urgency  Musculoskeletal: Negative  Skin: Negative  Allergic/Immunologic: Negative  Neurological: Negative  Hematological: Bruises/bleeds easily  Psychiatric/Behavioral: Negative  All other systems reviewed and are negative       Patient Active Problem List   Diagnosis    AAT (alpha-1-antitrypsin) deficiency ([address] )    Causalgia of lower limb    Essential hypertension    Gastroesophageal reflux disease without esophagitis    Chronic respiratory failure with hypoxia, on home O2 therapy (HCC)    HTN (hypertension)    BPH (benign prostatic hyperplasia)    Liver disease    Lung nodules    Former smoker    Chest pain at rest    [facility]monary [facility]ism [facility]    Chronic respiratory failure with hypoxia (HCC)    [address]rilobular emphysema ([facility] )    Lightheadedness    Weakness generalized    Oral candidiasis     Past Medical History:   Diagnosis Date    Anesthesia complication     Difficult to wake up    Arthritis     BPH (benign prostatic hyperplasia)     urinary frequency    Cancer (HCC)     basal cell neck, face    COPD (chronic obstructive pulmonary disease) ([facility] )     Full dentures     Hypertension     controlled    Kidney stone     at least 7 episodes    Liver disease     Alpha 1- enzyme deficiency - diagnosed 2002  has been on weekly replacement therapy since then    Pulmonary emphysema ([facility] [address] )     1/25/15  FEV1 - 2 45 liters or 59% of predicted    Wears glasses     for driving only     Past Surgical History:   Procedure Laterality Date    BACK SURGERY  2008    discectomy    COLONOSCOPY      COLONOSCOPY N/A 3/10/2017    Procedure: Maribel Mcnally;  Surgeon: Quang Nielsen MD;  Location: [facility]; Service:    Elizabeth Wallace      removal of kidney stones    ESOPHAGOGASTRODUODENOSCOPY N/A 3/10/2017    Procedure: ESOPHAGOGASTRODUODENOSCOPY (EGD); Surgeon: Quang Nielsen MD;  Location: [facility]; Service:     LITHOTRIPSY      TONSILLECTOMY      VEIN LIGATION AND STRIPPING Bilateral     18's     Family History   Problem Relation Age of Onset    Emphysema Mother         never smoked    Emphysema Father     Cancer Brother         colon    Colon cancer Brother     Ulcerative colitis Family     Liver disease Family      Social History     Social History    Marital status:      Spouse name: N/A    Number of children: N/A    Years of education: N/A     Occupational History    Not on file  Social History Main Topics    Smoking status: Former Smoker     Packs/day: 1 00     Years: 60 00     Quit date: 8/31/2017    Smokeless tobacco: Never Used      Comment: quit in august 2017    Alcohol use No    Drug use: No    Sexual activity: Not on file     Other Topics Concern    Not on file     Social History Narrative    No narrative on file       Current Outpatient Prescriptions:     albuterol (2 5 mg/3 mL) 0 083 % nebulizer solution, Inhale 1 each every 4 (four) hours as needed, Disp: , Rfl:     albuterol (PROVENTIL HFA,VENTOLIN HFA) 90 mcg/act inhaler, Inhale 2 puffs 4 (four) times a day, Disp: 1 Inhaler, Rfl: 5    Alpha1-Proteinase Inhibitor (ZEMAIRA IV), Infuse into a venous catheter once a week On tuesday -1000mg /50 ml , Disp: , Rfl:     amLODIPine (NORVASC) 10 mg tablet, TAKE ONE TABLET BY MOUTH ONE TIME DAILY , Disp: 60 tablet, Rfl: 5    amLODIPine (NORVASC) 5 mg tablet, Take 1 tablet (5 mg total) by mouth daily, Disp: 30 tablet, Rfl: 3    clotrimazole (MYCELEX) 10 mg jabari, Take 1 tablet (10 mg total) by mouth 4 (four) times a day for 5 days, Disp: 20 tablet, Rfl: 0    finasteride (PROSCAR) 5 mg tablet, Take 5 mg by mouth daily  , Disp: , Rfl:     fluticasone (FLONASE) 50 mcg/act nasal spray, 2 sprays into each nostril daily, Disp: 16 g, Rfl: 5    fluticasone-salmeterol (ADVAIR DISKUS) 250-50 mcg/dose inhaler, Inhale 1 puff 2 (two) times a day  , Disp: , Rfl:     ipratropium-albuterol (DUO-NEB) 0 5-2 5 mg/3 mL nebulizer solution, Take 1 vial (3 mL total) by nebulization 4 (four) times a day, Disp: 60 vial, Rfl: 0    ipratropium-albuterol (DUO-NEB) 0 5-2 5 mg/3 mL nebulizer solution, Take 1 vial (3 mL total) by nebulization 3 (three) times a day, Disp: 270 mL, Rfl: 5    meclizine (ANTIVERT) 12 5 MG tablet, Take 1 tablet by mouth daily, Disp: , Rfl:     omeprazole (PriLOSEC) 40 MG capsule, TAKE ONE CAPSULE BY MOUTH TWICE DAILY , Disp: 60 capsule, Rfl: 5    predniSONE 10 mg tablet, 60mg QD x3 days, 40mg QD x3 days, 20mg QD x3 days, 10mg QD x3 days, 5mg (1/2 tablet) QD x3 days, Disp: 42 tablet, Rfl: 0    sucralfate (CARAFATE) 1 g/10 mL suspension, Take 10 mL (1 g total) by mouth 4 (four) times a day, Disp: 420 mL, Rfl: 5    tamsulosin (FLOMAX) 0 4 mg, TAKE ONE CAPSULE BY MOUTH ONE TIME DAILY , Disp: 30 capsule, Rfl: 5    theophylline (ESTEFANIA-24) 400 MG 24 hr capsule, Take 1 capsule (400 mg total) by mouth daily, Disp: 90 capsule, Rfl: 3    ZEMAIRA injection, , Disp: , Rfl:     zolpidem (AMBIEN) 10 mg tablet, TAKE ONE TABLET BY MOUTH AT BEDTIME , Disp: 30 tablet, Rfl: 5    Allergies   Allergen Reactions    Chantix [Varenicline]      Caused prostate infection       Vitals:    10/24/18 1041   BP: (!) 200/100   Pulse: 90   Resp: 18   Temp: 98 1 °F (36 7 °C)   SpO2: 94%     Physical Exam   Constitutional: He is oriented to person, place, and time  He appears well-developed and well-nourished  Thin male, mild respiratory distress, nasal cannula O2 in place   HENT:   Head: Normocephalic and atraumatic  Right Ear: External ear normal    Left Ear: External ear normal    Nose: Nose normal    Mouth/Throat: Oropharynx is clear and moist    Eyes: Pupils are equal, round, and reactive to light  Conjunctivae and EOM are normal    Neck: Normal range of motion  Neck supple  Cardiovascular: Normal rate, regular rhythm, normal heart sounds and intact distal pulses  Pulmonary/Chest:   Decreased breath sounds bilaterally, otherwise clear   Abdominal: Soft  Bowel sounds are normal    Musculoskeletal: Normal range of motion  Neurological: He is alert and oriented to person, place, and time  He has normal reflexes  Skin: Skin is warm  Skin with relatively good color, scattered upper extremity ecchymoses, no petechiae   Psychiatric: He has a normal mood and affect   His behavior is normal  Judgment and thought content normal    Extremities: no lower extremity edema, no cords, pulses are 1+  Lymphatics: no adenopathy in the neck, supraclavicular region, axilla and groin bilaterally    Labs:    10/17/2018 WBC = 8 6 hemoglobin = 13 4 hematocrit = 39 MCV = 95 platelet = 108 neutrophil = 90% lymphocytes = 7% monocyte = 3% BUN = 33 creatinine = 0 94    06/12/2018 WBC = 6 3 hemoglobin = 13 8 hematocrit = 41 6 platelet = 843 neutrophil = 56%  04/04/2018 Alpha 1 antitrypsin = 74 = decreased   4/4/18 WBC = 11 hemoglobin = 13 2 hematocrit = 38 7 platelet = 712 neutrophil = 89%  2/18/18 BUN = 18 creatinine = 1 17 WBC = 5 9 hemoglobin = 12 1 hematocrit = 36 platelet = 523  8/32/93 alpha-fetoprotein = 4 8    Imaging    10/11/2018 CTA chest and CT scan abdomen/pelvis     No evidence of pulmonary embolus  Several small pulmonary nodules  New left upper lobe pulmonary nodule  Follow-up CT scan in 3-6 months is recommended  06/12/2018 CTA/chest    1  No pulmonary embolism  2   Spiculated right upper lobe nodule with mean diameter of 8 mm is unchanged in size  PET/CT in February was concerning for malignancy  Correlate with tissue sampling of medullary performed  3   Multiple new and increasing bilateral pulmonary nodules demonstrated on the order of 3 to 4 mm in size  Differential considerations include metastasis as well as acute infectious or inflammatory processes  2/14/18 CTA chest PE study     1   Acute pulmonary embolus in the distal left main pulmonary artery and proximal left lower lobe segmental branches  2   Moderate panlobular emphysema  3   Stable right upper and left lower pulmonary nodules measuring 1 2 x 1 3 cm   PET/CT was performed, based on current Fleischner Society 2017 Guidelines on incidental pulmonary nodule   Tissue sampling of the right upper lobe hypermetabolic nodule is   planned   3 month follow-up of left lower lobe pulmonary nodule as previously recommended      2/12/18 PET/CT     1   1 2 x 0 8 cm right upper lung nodule demonstrates hypermetabolism and is most concerning for malignancy   Tissue sampling recommended  2   1 3 x 0 6 cm left lower lung nodule does not demonstrate significant hypermetabolism   In the absence of tissue sampling, 3 month CT follow-up is recommended to exclude a low-grade neoplasm  3   No hypermetabolic metastases visualized

## 2018-10-25 ENCOUNTER — OFFICE VISIT (OUTPATIENT)
Dept: FAMILY MEDICINE CLINIC | Facility: CLINIC | Age: 73
End: 2018-10-25
Payer: MEDICARE

## 2018-10-25 VITALS
RESPIRATION RATE: 16 BRPM | TEMPERATURE: 98.1 F | SYSTOLIC BLOOD PRESSURE: 174 MMHG | DIASTOLIC BLOOD PRESSURE: 68 MMHG | HEIGHT: 77 IN | WEIGHT: 181 LBS | OXYGEN SATURATION: 97 % | HEART RATE: 87 BPM | BODY MASS INDEX: 21.37 KG/M2

## 2018-10-25 DIAGNOSIS — I10 ESSENTIAL HYPERTENSION: ICD-10-CM

## 2018-10-25 DIAGNOSIS — K21.9 GASTROESOPHAGEAL REFLUX DISEASE WITHOUT ESOPHAGITIS: ICD-10-CM

## 2018-10-25 DIAGNOSIS — R97.20 ELEVATED PSA: ICD-10-CM

## 2018-10-25 DIAGNOSIS — Z99.81 CHRONIC RESPIRATORY FAILURE WITH HYPOXIA, ON HOME O2 THERAPY (HCC): Primary | ICD-10-CM

## 2018-10-25 DIAGNOSIS — B37.83 CANDIDAL CHEILITIS: ICD-10-CM

## 2018-10-25 DIAGNOSIS — J96.11 CHRONIC RESPIRATORY FAILURE WITH HYPOXIA, ON HOME O2 THERAPY (HCC): Primary | ICD-10-CM

## 2018-10-25 PROCEDURE — 99495 TRANSJ CARE MGMT MOD F2F 14D: CPT | Performed by: FAMILY MEDICINE

## 2018-10-25 RX ORDER — NYSTATIN 100000 U/G
CREAM TOPICAL 2 TIMES DAILY
Qty: 30 G | Refills: 2 | Status: SHIPPED | OUTPATIENT
Start: 2018-10-25 | End: 2019-10-29 | Stop reason: ALTCHOICE

## 2018-10-25 NOTE — PROGRESS NOTES
Date and time hospital follow up call was made:  10/22/2018  [facility]  Hospital care reviewed:  Records reviewed  Patient was hopsitalized at:  [address]  Date of admission:  10/11/18  Date of discharge:  10/17/18  Diagnosis:  Chronic Resperatory Distress  Disposition:  Home  Were the patients medicaitons reviewed and updated:  Yes  Current symptoms:  Shortness of breath, Weakness  Shortness of breath severity:  Moderate  Weakness severity:  Moderate  Post hospital issues:  None  Should patient be enrolled in anticoag monitoring?:  No  Scheduled for follow up?:  Yes  Patients specialists:  Pulmonlolgist, Other (comment)  Did you obtain your prescribed medications:  No  Do you need help managing your perscriptions or medications:  No  Is transportation to your appointments needed:  No  I have advised the patient to call PCP with any new or worsening symptoms (please type in name along with any credentials):  Tari Rodriguez LPN  Are you recieving home care services:  No  Comments:  Patient will be in on Friday the 23rd for an appointment with Dr Rocio Barnes

## 2018-10-25 NOTE — PROGRESS NOTES
Assessment/Plan:    Will cont pres mnmt will f/u with specialists including pulm, urol and gi for possibly egd  will try nystain cream if lozenges dont help cheilitis  Will fax psa levels to urol       There are no diagnoses linked to this encounter  Subjective:      Patient ID: John Bertrand is a 75 y o  male  Date and time hospital follow up call was made:  10/22/2018  [facility]  Hospital care reviewed:  Records reviewed  Patient was hopsitalized at:  [address] [facility]  Date of admission:  10/11/18  Date of discharge:  10/17/18  Diagnosis:  Chronic Resperatory Distress  Disposition:  Home  Were the patients medicaitons reviewed and updated:  Yes  Current symptoms:  Shortness of breath, Weakness  Shortness of breath severity:  Moderate  Weakness severity:  Moderate  Post hospital issues:  None  Should patient be enrolled in anticoag monitoring?:  No  Scheduled for follow up?:  Yes  Patients specialists:  Pulmonlolgist, Other (comment)  Did you obtain your prescribed medications:  No  Do you need help managing your perscriptions or medications:  No  Is transportation to your appointments needed:  No  I have advised the patient to call PCP with any new or worsening symptoms   (please type in name along with any credentials):  Stacie Hanna LPN  Are you recieving home care services:  No  Comments:  Patient will be in on Friday the 23rd for an appointment with   Dr Ange Vernon  Patient seen in f/u to hospitalization for exac copd  Also had gi consult for abd pain had meds changed  Has appointment in 2 weeks to see gi   Has some bph and an elev psa  Will be f/u with urol in dec  So is considering a procedure   Has lesions at corners of mouth  Has had yeast infections due to inhaler           The following portions of the patient's history were reviewed and updated as appropriate: past family history, past social history and past surgical history  Review of Systems   Constitutional: Positive for fatigue  HENT: Negative  Eyes: Negative  Respiratory:        See hpi   Cardiovascular: Negative  Gastrointestinal:        See hpi   Musculoskeletal: Negative  Skin: Negative  Neurological: Negative  Objective:      BP (!) 174/68 (BP Location: Right arm, Patient Position: Sitting, Cuff Size: Adult)   Pulse 87   Temp 98 1 °F (36 7 °C) (Tympanic)   Resp 16   Ht 6' 5" (1 956 m)   Wt 82 1 kg (181 lb)   SpO2 97%   BMI 21 46 kg/m²          Physical Exam   Constitutional: He is oriented to person, place, and time  Thin male   HENT:   Head: Normocephalic and atraumatic  Right Ear: External ear normal    Left Ear: External ear normal    Eyes: Pupils are equal, round, and reactive to light  Conjunctivae are normal    Neck: Normal range of motion  Neck supple  Cardiovascular: Normal rate, regular rhythm and normal heart sounds  Pulmonary/Chest: Effort normal  He has no wheezes  He has no rales  Dec bs bessie   Abdominal: Soft  Bowel sounds are normal  There is no tenderness  Musculoskeletal: Normal range of motion  Neurological: He is alert and oriented to person, place, and time  Skin: Skin is warm  Psychiatric: He has a normal mood and affect   His behavior is normal

## 2018-11-02 ENCOUNTER — PATIENT OUTREACH (OUTPATIENT)
Dept: CASE MANAGEMENT | Facility: HOSPITAL | Age: 73
End: 2018-11-02

## 2018-11-02 NOTE — PROGRESS NOTES
Followup call from patients recent discharge (10/17) with acute on chronic respiratory failure, AE COPD  Pt is known to have GOLD IV COPD with fev1 of 40% (w/chronic respiratory failure)  He has been to see all providers as scheduled and states his candidiasis seems to be improving  His biggest concern at present is the EOB statements coming from Medicare stating home O2 is being denied payment  He reports he has called his DME and pulmonary office without resolution  I have outreached to the DME home marketing [address] for assistance with investigating this and will followup for the pt  He has been made aware of this, as well

## 2018-11-07 ENCOUNTER — OFFICE VISIT (OUTPATIENT)
Dept: PULMONOLOGY | Facility: MEDICAL CENTER | Age: 73
End: 2018-11-07
Payer: MEDICARE

## 2018-11-07 ENCOUNTER — OFFICE VISIT (OUTPATIENT)
Dept: GASTROENTEROLOGY | Facility: CLINIC | Age: 73
End: 2018-11-07
Payer: MEDICARE

## 2018-11-07 VITALS
TEMPERATURE: 98.3 F | SYSTOLIC BLOOD PRESSURE: 148 MMHG | WEIGHT: 183.8 LBS | HEIGHT: 77 IN | HEART RATE: 78 BPM | DIASTOLIC BLOOD PRESSURE: 62 MMHG | BODY MASS INDEX: 21.7 KG/M2

## 2018-11-07 VITALS
SYSTOLIC BLOOD PRESSURE: 156 MMHG | RESPIRATION RATE: 12 BRPM | DIASTOLIC BLOOD PRESSURE: 84 MMHG | BODY MASS INDEX: 21.17 KG/M2 | OXYGEN SATURATION: 95 % | HEART RATE: 81 BPM | HEIGHT: 78 IN | TEMPERATURE: 97.9 F | WEIGHT: 183 LBS

## 2018-11-07 DIAGNOSIS — K21.9 GASTROESOPHAGEAL REFLUX DISEASE WITHOUT ESOPHAGITIS: Primary | ICD-10-CM

## 2018-11-07 DIAGNOSIS — J43.2 CENTRILOBULAR EMPHYSEMA (HCC): Primary | ICD-10-CM

## 2018-11-07 DIAGNOSIS — Z99.81 CHRONIC RESPIRATORY FAILURE WITH HYPOXIA, ON HOME O2 THERAPY (HCC): ICD-10-CM

## 2018-11-07 DIAGNOSIS — J96.11 CHRONIC RESPIRATORY FAILURE WITH HYPOXIA, ON HOME O2 THERAPY (HCC): ICD-10-CM

## 2018-11-07 PROCEDURE — 99214 OFFICE O/P EST MOD 30 MIN: CPT | Performed by: NURSE PRACTITIONER

## 2018-11-07 PROCEDURE — 99214 OFFICE O/P EST MOD 30 MIN: CPT | Performed by: PHYSICIAN ASSISTANT

## 2018-11-07 RX ORDER — PANTOPRAZOLE SODIUM 40 MG/1
40 TABLET, DELAYED RELEASE ORAL 2 TIMES DAILY
Qty: 60 TABLET | Refills: 2 | Status: SHIPPED | OUTPATIENT
Start: 2018-11-07 | End: 2019-02-06 | Stop reason: SDUPTHER

## 2018-11-07 NOTE — PATIENT INSTRUCTIONS
U qualified once again for supplemental oxygen at 2 L with  Ambulation  Continue year maintenance medication  Hopefully you will here soon from [employer] for portable concentrator

## 2018-11-07 NOTE — ASSESSMENT & PLAN NOTE
Patient is in need of oxygen supplementation  Room air oxygen at rest was 96%  Room air after ambulation walking 2 laps was 85%  With 2 L of supplemental oxygen at rest was 98% and with walking 2 laps at 2 L oxygen saturation was 95%  Portable oxygen tanks are causing additional adverse medical outcomes in patient's breathing that does not allow for adequate ambulation  Patient will benefit from usage of a POCvia nasal cannula  I will have [employer], evaluate and supply patient with a POC at a setting of continous  O2 saturation on room air at rest96%, O2 saturation on room air on ambulation 85%, O2 saturation on 2 L/M on ambulation 95% via nasal cannula  Portable oxygen tanks would cause additional adverse medical outcomes in patients breathing that would not allow for adequate ambulation

## 2018-11-07 NOTE — PROGRESS NOTES
Assessment/Plan:     Problem List Items Addressed This Visit        Respiratory    Chronic respiratory failure with hypoxia, on home O2 therapy ([facility] [address] )     Patient is in need of oxygen supplementation  Room air oxygen at rest was 96%  Room air after ambulation walking 2 laps was 85%  With 2 L of supplemental oxygen at rest was 98% and with walking 2 laps at 2 L oxygen saturation was 95%  Portable oxygen tanks are causing additional adverse medical outcomes in patient's breathing that does not allow for adequate ambulation  Patient will benefit from usage of a POCvia nasal cannula  I will have [facility], evaluate and supply patient with a POC at a setting of continous  O2 saturation on room air at rest96%, O2 saturation on room air on ambulation 85%, O2 saturation on 2 L/M on ambulation 95% via nasal cannula  Portable oxygen tanks would cause additional adverse medical outcomes in patients breathing that would not allow for adequate ambulation  Centrilobular emphysema ([facility] [address] ) - Primary     Patient has a history of alpha-1 antitrypsin deficiency  He also has history of centrilobular emphysema  Pulmonary function test in the past revealed FEV1 of 1 61 L or 40% of predicted, he is using Advair 250/51 puff b i d  He is also using Duo nebs on a daily basis  Based on ARISCAT score, he has at low risk for postoperative complete complications  This scoring is based on patient's age oxygen at baseline at rest, whether he he has had a recent lung infection, duration of surgery  No surgical incisions will be made  Therefore he is at low risk  He does have centrilobular emphysema and is oxygen dependent  Mild to moderate risk isn't anticipated  Relevant Orders    Home Oxygen with Portability          HPI:  Patient is here today for follow-up  He was last seen in the office October 23, 2018  He had been on a high tapering dose of oral prednisone had been hospitalized    While at his last office visit he was qualified for supplemental oxygen with ambulation  He has a history of chronic respiratory failure with hypoxia  Return in about 3 months (around 2/7/2019)  All questions are answered to the patient's satisfaction and understanding  He verbalizes understanding  He is encouraged to call with any further questions or concerns  Portions of the record may have been created with voice recognition software  Occasional wrong word or "sound a like" substitutions may have occurred due to the inherent limitations of voice recognition software  Read the chart carefully and recognize, using context, where substitutions have occurred  Electronically Signed by TOPHER Liu    ______________________________________________________________________    Chief Complaint:   Chief Complaint   Patient presents with    Shortness of Breath     pt states that it depends on the day   Cough     once in a while       Patient ID: Hurman Phoenix is a 75 y o  y o  male has a past medical history of Anesthesia complication; Arthritis; BPH (benign prostatic hyperplasia); Cancer [facility]); COPD (chronic obstructive pulmonary disease) ([facility] [address] ); Full dentures; Hypertension; Kidney stone; Liver disease; Pulmonary emphysema ([facility] [address] ); and Wears glasses  11/7/2018  Patient presents today for follow-up visit  Shortness of Breath   This is a chronic problem  The current episode started more than 1 year ago  The problem occurs daily  The problem has been gradually worsening  The symptoms are aggravated by exercise and any activity  The patient has no known risk factors for DVT/PE  He has tried beta agonist inhalers, ipratropium inhalers and steroid inhalers for the symptoms  The treatment provided mild relief  His past medical history is significant for chronic lung disease and COPD  Cough   Associated symptoms include shortness of breath  His past medical history is significant for COPD         Review of Systems   Constitutional: Negative  HENT: Negative  Eyes: Negative  Respiratory: Positive for cough and shortness of breath  Cardiovascular: Negative  Gastrointestinal: Negative  Endocrine: Negative  Genitourinary: Negative  Musculoskeletal: Negative  Skin: Negative  Allergic/Immunologic: Negative  Neurological: Negative  Hematological: Negative  Psychiatric/Behavioral: Negative  Smoking history: He reports that he quit smoking about 14 months ago  He has a 60 00 pack-year smoking history   He has never used smokeless tobacco     The following portions of the patient's history were reviewed and updated as appropriate: allergies, current medications, past family history, past medical history, past social history, past surgical history and problem list     Immunization History   Administered Date(s) Administered    Influenza 09/26/2018    Influenza TIV (IM) 09/27/2013, 10/23/2014    Pneumococcal Conjugate 13-Valent 04/26/2016    Pneumococcal Polysaccharide PPV23 04/23/2011    Tdap 04/26/2016    Zoster 10/29/2014, 04/27/2015, 07/24/2018     Current Outpatient Prescriptions   Medication Sig Dispense Refill    albuterol (2 5 mg/3 mL) 0 083 % nebulizer solution Inhale 1 each every 4 (four) hours as needed      albuterol (PROVENTIL HFA,VENTOLIN HFA) 90 mcg/act inhaler Inhale 2 puffs 4 (four) times a day 1 Inhaler 5    Alpha1-Proteinase Inhibitor (ZEMAIRA IV) Infuse into a venous catheter once a week On tuesday -1000mg /50 ml       amLODIPine (NORVASC) 10 mg tablet TAKE ONE TABLET BY MOUTH ONE TIME DAILY  60 tablet 5    finasteride (PROSCAR) 5 mg tablet Take 5 mg by mouth daily        fluticasone (FLONASE) 50 mcg/act nasal spray 2 sprays into each nostril daily 16 g 5    ipratropium-albuterol (DUO-NEB) 0 5-2 5 mg/3 mL nebulizer solution Take 1 vial (3 mL total) by nebulization 4 (four) times a day 60 vial 0    meclizine (ANTIVERT) 12 5 MG tablet Take 1 tablet by mouth daily      nystatin (MYCOSTATIN) cream Apply topically 2 (two) times a day 30 g 2    pantoprazole (PROTONIX) 40 mg tablet Take 1 tablet (40 mg total) by mouth 2 (two) times a day 60 tablet 2    sucralfate (CARAFATE) 1 g/10 mL suspension Take 10 mL (1 g total) by mouth 4 (four) times a day 420 mL 5    tamsulosin (FLOMAX) 0 4 mg TAKE ONE CAPSULE BY MOUTH ONE TIME DAILY  30 capsule 5    theophylline (ESTEFANIA-24) 400 MG 24 hr capsule Take 1 capsule (400 mg total) by mouth daily 90 capsule 3    ZEMAIRA injection       zolpidem (AMBIEN) 10 mg tablet TAKE ONE TABLET BY MOUTH AT BEDTIME  30 tablet 5    amLODIPine (NORVASC) 5 mg tablet Take 1 tablet (5 mg total) by mouth daily (Patient not taking: Reported on 11/7/2018 ) 30 tablet 3    fluticasone-salmeterol (ADVAIR DISKUS) 250-50 mcg/dose inhaler Inhale 1 puff 2 (two) times a day        ipratropium-albuterol (DUO-NEB) 0 5-2 5 mg/3 mL nebulizer solution Take 1 vial (3 mL total) by nebulization 3 (three) times a day (Patient not taking: Reported on 11/7/2018 ) 270 mL 5     No current facility-administered medications for this visit  Allergies: Chantix [varenicline]    Objective:  Vitals:    11/07/18 1258 11/07/18 1303   BP: 156/84    BP Location: Left arm    Patient Position: Sitting    Cuff Size: Standard    Pulse: 81    Resp: 12    Temp: 97 9 °F (36 6 °C)    TempSrc: Oral    SpO2: 95% 95%   Weight: 83 kg (183 lb)    Height: 6' 6" (1 981 m)    Oxygen Therapy  SpO2: 95 %  Oxygen Therapy: Supplemental oxygen  O2 Delivery Method: Nasal cannula  O2 Flow Rate (L/min): 3 L/min    Wt Readings from Last 3 Encounters:   11/07/18 83 kg (183 lb)   11/07/18 83 4 kg (183 lb 12 8 oz)   10/25/18 82 1 kg (181 lb)     Body mass index is 21 15 kg/m²  Physical Exam   Constitutional: He is oriented to person, place, and time  He appears well-developed and well-nourished  HENT:   Head: Normocephalic and atraumatic  Mallampati 4   Eyes: Pupils are equal, round, and reactive to light   Conjunctivae are normal    Neck: Normal range of motion  Neck supple  Cardiovascular: Normal rate and regular rhythm  Pulmonary/Chest: Breath sounds normal    Tachypneic   Abdominal: Soft  Musculoskeletal: Normal range of motion  Neurological: He is alert and oriented to person, place, and time  Skin: Skin is warm and dry  Psychiatric: He has a normal mood and affect  His behavior is normal  Thought content normal        Lab Review:   Admission on 10/11/2018, Discharged on 10/17/2018   No results displayed because visit has over 200 results  Appointment on 06/12/2018   Component Date Value    Sodium 06/12/2018 141     Potassium 06/12/2018 4 2     Chloride 06/12/2018 104     CO2 06/12/2018 28     ANION GAP 06/12/2018 9     BUN 06/12/2018 11     Creatinine 06/12/2018 1 25     Glucose, Fasting 06/12/2018 112*    Calcium 06/12/2018 9 4     eGFR 06/12/2018 56     WBC 06/12/2018 6 38     RBC 06/12/2018 4 33     Hemoglobin 06/12/2018 13 8     Hematocrit 06/12/2018 41 6     MCV 06/12/2018 96     MCH 06/12/2018 31 9     MCHC 06/12/2018 33 2     RDW 06/12/2018 13 2     MPV 06/12/2018 9 5     Platelets 05/47/2706 228     nRBC 06/12/2018 0     Neutrophils Relative 06/12/2018 56     Immat GRANS % 06/12/2018 1     Lymphocytes Relative 06/12/2018 28     Monocytes Relative 06/12/2018 9     Eosinophils Relative 06/12/2018 5     Basophils Relative 06/12/2018 1     Neutrophils Absolute 06/12/2018 3 63     Immature Grans Absolute 06/12/2018 0 03     Lymphocytes Absolute 06/12/2018 1 79     Monocytes Absolute 06/12/2018 0 56     Eosinophils Absolute 06/12/2018 0 30     Basophils Absolute 06/12/2018 0 07    Appointment on 05/17/2018   Component Date Value    PSA, Diagnostic 05/17/2018 4 8*       Diagnostics:  I have personally reviewed pertinent reports  Office Spirometry Results:     ESS:    Xr Chest 1 View Portable    Result Date: 10/11/2018  Narrative: CHEST INDICATION:   Shortness of breath  COMPARISON:  None EXAM PERFORMED/VIEWS:  XR CHEST PORTABLE FINDINGS: Cardiomediastinal silhouette appears unremarkable  Left basilar streaky density possibly subsegmental atelectasis  No pneumothorax  No pleural effusion  Osseous structures appear within normal limits for patient age  Impression: Left basilar streaky density possibly subsegmental atelectasis  Workstation performed: [id number]     Cta Chest Ct Abdomen Pelvis W Contrast    Result Date: 10/11/2018  Narrative: CT PULMONARY ANGIOGRAM OF THE CHEST AND CT ABDOMEN AND PELVIS WITH INTRAVENOUS CONTRAST INDICATION:   dyspnea, history of PE  COMPARISON:  June 12, 2018 TECHNIQUE:  CT examination of the chest, abdomen and pelvis was performed  Thin section CT angiographic technique was used in the chest in order to evaluate for pulmonary embolus and coronal 3D MIP postprocessing was performed on the acquisition scanner  Axial, sagittal, and coronal 2D reformatted images were created from the source data and submitted for interpretation  Radiation dose length product (DLP) for this visit:  891 41 mGy-cm   This examination, like all CT scans performed in the [facility], was performed utilizing techniques to minimize radiation dose exposure, including the use of iterative  reconstruction and automated exposure control  IV Contrast:  100 mL of iohexol (OMNIPAQUE) Enteric Contrast:  Enteric contrast was not administered  FINDINGS: CHEST PULMONARY ARTERIAL TREE:  No pulmonary embolus is seen  LUNGS:  Emphysema is again noted  Several small pulmonary nodules are again visualized  Right upper lobe nodule measuring 8 mm is unchanged  4 mm right lower lobe nodule is noted  2 mm left lower lobe nodule is unchanged  Several adjacent triangular-shaped right lower lobe  Visual nodules are again visualized  Bulla is seen in the right lower lobe  There is a new left upper lobe pulmonary nodule measuring 5 mm (series 4 image 18)   PLEURA: Unremarkable  HEART/AORTA:  Unremarkable for patient's age  MEDIASTINUM AND MIKE:  Small hiatal hernia is seen  Chronic subsegmental esophageal dilatation is again visualized  CHEST WALL AND LOWER NECK:   Unremarkable  ABDOMEN LIVER/BILIARY TREE:  Unremarkable  GALLBLADDER:  No calcified gallstones  No pericholecystic inflammatory change  SPLEEN:  Unremarkable  PANCREAS:  Unremarkable  ADRENAL GLANDS:  Unremarkable  KIDNEYS/URETERS:  One or more simple renal cyst(s) is noted  Otherwise unremarkable kidneys  No hydronephrosis  STOMACH AND BOWEL:  There is colonic diverticulosis without evidence of acute diverticulitis  APPENDIX:  No findings to suggest appendicitis  ABDOMINOPELVIC CAVITY:  No ascites or free intraperitoneal air  No lymphadenopathy  VESSELS:  Atherosclerotic changes are present  No evidence of aneurysm  PELVIS REPRODUCTIVE ORGANS:  Unremarkable for patient's age  URINARY BLADDER:  Unremarkable  ABDOMINAL WALL/INGUINAL REGIONS:  Subcentimeter inguinal lymph nodes are visualized  OSSEOUS STRUCTURES:  Degenerative changes in the spine are seen  Impression: No evidence of pulmonary embolus Several small pulmonary nodules  New left upper lobe pulmonary nodule  Follow-up CT scan in 3-6 months is recommended  The study was marked in EPIC for significant notification  Workstation performed: [id number]     Us Abdomen Limited    Result Date: 10/16/2018  Narrative: RIGHT UPPER QUADRANT ULTRASOUND INDICATION:    n/v abd pain  COMPARISON:  CT from 10/11/2018 TECHNIQUE:   Real-time ultrasound of the right upper quadrant was performed with a curvilinear transducer with both volumetric sweeps and still imaging techniques  FINDINGS: PANCREAS:  Visualized portions of the pancreas are within normal limits  AORTA AND IVC:  Visualized portions are normal for patient age  LIVER: Size:  Mildly enlarged  The liver measures 17 2 cm in the midclavicular line  Contour:  Surface contour is smooth  Parenchyma:   There is moderate diffuse increased echogenicity with smooth echotexture and acoustic beam attenuation  Most consistent with moderate hepatic steatosis  No evidence of suspicious mass  Limited imaging of the main portal vein shows it to be patent and hepatopetal   BILIARY: The gallbladder is normal in caliber  No wall thickening or pericholecystic fluid  No stones or sludge identified  No sonographic Perez's sign  No intrahepatic biliary dilatation  CBD measures 4 mm  No choledocholithiasis  KIDNEY: Right kidney measures 10 8 x 4 3 cm  Within normal limits  ASCITES:   None  Impression: 1  No acute abnormality  2   Hepatomegaly with moderately increased liver echotexture likely representing hepatic steatosis   Workstation performed: [id number]

## 2018-11-07 NOTE — ASSESSMENT & PLAN NOTE
Patient has a history of alpha-1 antitrypsin deficiency  He also has history of centrilobular emphysema  Pulmonary function test in the past revealed FEV1 of 1 61 L or 40% of predicted, he is using Advair 250/51 puff b i d  He is also using Duo nebs on a daily basis  Based on ARISCAT score, he has at low risk for postoperative complete complications  This scoring is based on patient's age oxygen at baseline at rest, whether he he has had a recent lung infection, duration of surgery  No surgical incisions will be made  Therefore he is at low risk  He does have centrilobular emphysema and is oxygen dependent  Mild to moderate risk isn't anticipated

## 2018-11-07 NOTE — ASSESSMENT & PLAN NOTE
Patient reports he has been on omeprazole 40 mg twice daily for years, and so was had worsening symptoms over the last several weeks  Characterized by heartburn and epigastric pain with burning sensation  Rule out esophageal ulcerations, Hyatt's or malignancy     - We'll change omeprazole to pantoprazole, continue same dosage 40 mg twice daily    -  Patient was explained about the lifestyle and dietary modifications  Advised to avoid fatty foods, chocolates, caffeine, alcohol and any other triggering foods  Avoid eating for at least 3 hours before going to bed  - Will plan for EGD    -  Patient was explained about  the risks and benefits of the procedure  Risks including but not limited to bleeding, infection, perforation were explained in detail  Also explained about less than 100% sensitivity with the exam and other alternatives  - Regarding his symptom of bloating, I advised he can try a trial of lactose restriction for a few weeks    If this is ineffective, we can advise about other dietary alternatives (e g FODMAP, etc)

## 2018-11-07 NOTE — PROGRESS NOTES
Follow-up Note -  [facility]  Green Monika 1945 75 y o  male         Reason:  Follow-up; heartburn, epigastric discomfort    HPI:  16-year-old male with history of Alpha-1 antitrypsin deficiency, COPD, oxygen dependence on 3 L continuous presents for follow-up; he was recently hospitalized a month ago with COPD exacerbation, and was seen as an inpatient by our service because he was reporting worsened GERD symptoms over the last several weeks  ,  As well as some epigastric pain  EGD was deferred for the time being due to his respiratory status, but he did have ultrasound checked which only showed some hepatomegaly with steatosis  At this time, the patient says he is still having mostly the same symptoms, a fairly constant burning sensation in the substernal region, epigastric region, and actually radiating down into the abdomen as well  He says he does have a fair amount of bloating and flatulence  He has been taking Carafate since his recent hospitalization, in addition to the omeprazole twice daily that he has been on for years  He says that his symptoms are generally worse at night and with lying down on his back  He says that when he eats, the symptoms temporarily get better and then become worse  Otherwise he denies any change in bowel habits, denies any diarrhea, constipation, blood or mucus in the stools  REVIEW OF SYSTEMS:      CONSTITUTIONAL: Denies any fever, chills, or rigors  Good appetite, and no recent weight loss  HEENT: No earache or tinnitus  Denies hearing loss or visual disturbances  CARDIOVASCULAR: No chest pain or palpitations  RESPIRATORY: Denies any cough, hemoptysis, shortness of breath or dyspnea on exertion  GASTROINTESTINAL: As noted in the History of Present Illness  GENITOURINARY: No problems with urination  Denies any hematuria or dysuria  NEUROLOGIC: No dizziness or vertigo, denies headaches     MUSCULOSKELETAL: Denies any muscle or joint pain    SKIN: Denies skin rashes or itching  ENDOCRINE: Denies excessive thirst  Denies intolerance to heat or cold  PSYCHOSOCIAL: Denies depression or anxiety  Denies any recent memory loss  Past Medical History:   Diagnosis Date    Anesthesia complication     Difficult to wake up    Arthritis     BPH (benign prostatic hyperplasia)     urinary frequency    Cancer (HCC)     basal cell neck, face    COPD (chronic obstructive pulmonary disease) (HCC)     Full dentures     Hypertension     controlled    Kidney stone     at least 7 episodes    Liver disease     Alpha 1- enzyme deficiency - diagnosed 2002  has been on weekly replacement therapy since then    Pulmonary emphysema ([facility] [address] )     1/25/15  FEV1 - 2 45 liters or 59% of predicted    Wears glasses     for driving only      Past Surgical History:   Procedure Laterality Date    BACK SURGERY  2008    discectomy    COLONOSCOPY      COLONOSCOPY N/A 3/10/2017    Procedure: Marylen Cheek;  Surgeon: Sharifa Saenz MD;  Location: [place] [address] GI LAB; Service:    [place]      removal of kidney stones    ESOPHAGOGASTRODUODENOSCOPY N/A 3/10/2017    Procedure: ESOPHAGOGASTRODUODENOSCOPY (EGD); Surgeon: Sharifa Saenz MD;  [facility]; Service:     LITHOTRIPSY      TONSILLECTOMY      VEIN LIGATION AND STRIPPING Bilateral     1980's     Social History     Social History    Marital status:      Spouse name: N/A    Number of children: N/A    Years of education: N/A     Occupational History    Not on file       Social History Main Topics    Smoking status: Former Smoker     Packs/day: 1 00     Years: 60 00     Quit date: 8/31/2017    Smokeless tobacco: Never Used      Comment: quit in august 2017    Alcohol use No    Drug use: No    Sexual activity: Not on file     Other Topics Concern    Not on file     Social History Narrative    No narrative on file     Family History   Problem Relation Age of Onset    Emphysema Mother never smoked    Emphysema Father     Cancer Brother         colon    Colon cancer Brother     Ulcerative colitis Family     Liver disease Family      Chantix [varenicline]  Current Outpatient Prescriptions   Medication Sig Dispense Refill    albuterol (2 5 mg/3 mL) 0 083 % nebulizer solution Inhale 1 each every 4 (four) hours as needed      albuterol (PROVENTIL HFA,VENTOLIN HFA) 90 mcg/act inhaler Inhale 2 puffs 4 (four) times a day 1 Inhaler 5    Alpha1-Proteinase Inhibitor (ZEMAIRA IV) Infuse into a venous catheter once a week On tuesday -1000mg /50 ml       amLODIPine (NORVASC) 10 mg tablet TAKE ONE TABLET BY MOUTH ONE TIME DAILY  60 tablet 5    finasteride (PROSCAR) 5 mg tablet Take 5 mg by mouth daily        fluticasone (FLONASE) 50 mcg/act nasal spray 2 sprays into each nostril daily 16 g 5    fluticasone-salmeterol (ADVAIR DISKUS) 250-50 mcg/dose inhaler Inhale 1 puff 2 (two) times a day        ipratropium-albuterol (DUO-NEB) 0 5-2 5 mg/3 mL nebulizer solution Take 1 vial (3 mL total) by nebulization 4 (four) times a day 60 vial 0    nystatin (MYCOSTATIN) cream Apply topically 2 (two) times a day 30 g 2    omeprazole (PriLOSEC) 40 MG capsule TAKE ONE CAPSULE BY MOUTH TWICE DAILY  60 capsule 5    sucralfate (CARAFATE) 1 g/10 mL suspension Take 10 mL (1 g total) by mouth 4 (four) times a day 420 mL 5    tamsulosin (FLOMAX) 0 4 mg TAKE ONE CAPSULE BY MOUTH ONE TIME DAILY  30 capsule 5    theophylline (ESTEFANIA-24) 400 MG 24 hr capsule Take 1 capsule (400 mg total) by mouth daily 90 capsule 3    zolpidem (AMBIEN) 10 mg tablet TAKE ONE TABLET BY MOUTH AT BEDTIME  30 tablet 5    amLODIPine (NORVASC) 5 mg tablet Take 1 tablet (5 mg total) by mouth daily (Patient not taking: Reported on 11/7/2018 ) 30 tablet 3    ipratropium-albuterol (DUO-NEB) 0 5-2 5 mg/3 mL nebulizer solution Take 1 vial (3 mL total) by nebulization 3 (three) times a day (Patient not taking: Reported on 11/7/2018 ) 270 mL 5  meclizine (ANTIVERT) 12 5 MG tablet Take 1 tablet by mouth daily      predniSONE 10 mg tablet 60mg QD x3 days, 40mg QD x3 days, 20mg QD x3 days, 10mg QD x3 days, 5mg (1/2 tablet) QD x3 days (Patient not taking: Reported on 11/7/2018 ) 42 tablet 0    ZEMAIRA injection        No current facility-administered medications for this visit  Blood pressure 148/62, pulse 78, temperature 98 3 °F (36 8 °C), temperature source Tympanic, height 6' 5" (1 956 m), weight 83 4 kg (183 lb 12 8 oz)  PHYSICAL EXAM:      General Appearance:   Alert, on oxygen 3L, ambulates with cane, cooperative, no distress, appears stated age    HEENT:   Normocephalic, atraumatic, anicteric      Neck:  Supple, symmetrical, trachea midline, no adenopathy;    thyroid: no enlargement/tenderness/nodules; no carotid  bruit or JVD    Lungs:   Clear to auscultation bilaterally; no rales, rhonchi or wheezing; respirations unlabored    Heart[de-identified]   S1 and S2 normal; regular rate and rhythm; no murmur, rub, or gallop     Abdomen:   Soft, non-tender, non-distended; normal bowel sounds; no masses, no organomegaly    Extremities: No edema, erythema, wounds, rashes   Rectal:   Deferred                      Lab Results   Component Value Date    WBC 8 69 10/17/2018    HGB 13 4 10/17/2018    HCT 39 0 10/17/2018    MCV 95 10/17/2018     10/17/2018     Lab Results   Component Value Date    GLUCOSE 89 09/27/2016    CALCIUM 8 5 10/17/2018     09/27/2016    K 4 3 10/17/2018    CO2 25 10/17/2018     10/17/2018    BUN 33 (H) 10/17/2018    CREATININE 0 94 10/17/2018     Lab Results   Component Value Date    ALT 36 10/11/2018    AST 19 10/11/2018    ALKPHOS 71 10/11/2018    BILITOT 1 0 09/27/2016     Lab Results   Component Value Date    INR 1 03 10/11/2018    INR 1 08 03/29/2018    INR 1 06 02/14/2018    PROTIME 10 8 10/11/2018    PROTIME 11 4 03/29/2018    PROTIME 11 1 02/14/2018       Xr Chest 1 View Portable    Result Date: 10/11/2018  Impression: Left basilar streaky density possibly subsegmental atelectasis  Workstation performed: [id number]     Cta Chest Ct Abdomen Pelvis W Contrast    Result Date: 10/11/2018  Impression: No evidence of pulmonary embolus Several small pulmonary nodules  New left upper lobe pulmonary nodule  Follow-up CT scan in 3-6 months is recommended  The study was marked in EPIC for significant notification  Workstation performed: [id number]     Us Abdomen Limited    Result Date: 10/16/2018  Impression: 1  No acute abnormality  2   Hepatomegaly with moderately increased liver echotexture likely representing hepatic steatosis  Workstation performed: [id number]       ASSESSMENT & PLAN:    Gastroesophageal reflux disease without esophagitis  Patient reports he has been on omeprazole 40 mg twice daily for years, and so was had worsening symptoms over the last several weeks  Characterized by heartburn and epigastric pain with burning sensation  Rule out esophageal ulcerations, Hyatt's or malignancy     - We'll change omeprazole to pantoprazole, continue same dosage 40 mg twice daily    -  Patient was explained about the lifestyle and dietary modifications  Advised to avoid fatty foods, chocolates, caffeine, alcohol and any other triggering foods  Avoid eating for at least 3 hours before going to bed  - Will plan for EGD    -  Patient was explained about  the risks and benefits of the procedure  Risks including but not limited to bleeding, infection, perforation were explained in detail  Also explained about less than 100% sensitivity with the exam and other alternatives  - Regarding his symptom of bloating, I advised he can try a trial of lactose restriction for a few weeks    If this is ineffective, we can advise about other dietary alternatives (e g FODMAP, etc)

## 2018-11-15 ENCOUNTER — PATIENT OUTREACH (OUTPATIENT)
Dept: CASE MANAGEMENT | Facility: OTHER | Age: 73
End: 2018-11-15

## 2018-11-19 ENCOUNTER — ANESTHESIA EVENT (OUTPATIENT)
Dept: GASTROENTEROLOGY | Facility: AMBULARY SURGERY CENTER | Age: 73
End: 2018-11-19
Payer: MEDICARE

## 2018-11-19 NOTE — ANESTHESIA PREPROCEDURE EVALUATION
Review of Systems/Medical History  Patient summary reviewed  Chart reviewed  History of anesthetic complications (prolonged awkening)     Cardiovascular  Hypertension ,    Pulmonary  Smoker ex-smoker  , COPD (emphysema, baseline O2 2 LPM) severe- O2 dependent ,        GI/Hepatic    GERD , Liver disease (Alpha 1 antitrypsin deficiency (on replacement therapy)) ,   Comment: IBS     Kidney stones, Prostatic disorder, benign prostatic hyperplasia       Endo/Other     GYN       Hematology   Musculoskeletal    Arthritis     Neurology   Psychology           Physical Exam    Airway    Mallampati score: II  TM Distance: >3 FB  Neck ROM: full     Dental   upper dentures and lower dentures,     Cardiovascular  Rhythm: regular, Rate: normal,     Pulmonary  Breath sounds clear to auscultation,     Other Findings        Anesthesia Plan  ASA Score- 4     Anesthesia Type- IV sedation with anesthesia with ASA Monitors  Additional Monitors:   Airway Plan:         Plan Factors-    Induction- intravenous  Postoperative Plan-     Informed Consent- Anesthetic plan and risks discussed with patient  I personally reviewed this patient with the CRNA  Discussed and agreed on the Anesthesia Plan with the CRNA  Corwin Sinclair

## 2018-11-19 NOTE — PRE-PROCEDURE INSTRUCTIONS
Pre-Surgery Instructions:   Medication Instructions    albuterol (2 5 mg/3 mL) 0 083 % nebulizer solution Patient was instructed by Physician and understands   albuterol (PROVENTIL HFA,VENTOLIN HFA) 90 mcg/act inhaler Patient was instructed by Physician and understands   amLODIPine (NORVASC) 10 mg tablet Patient was instructed by Physician and understands   finasteride (PROSCAR) 5 mg tablet Patient was instructed by Physician and understands   fluticasone (FLONASE) 50 mcg/act nasal spray Patient was instructed by Physician and understands   fluticasone-salmeterol (ADVAIR DISKUS) 250-50 mcg/dose inhaler Patient was instructed by Physician and understands   ipratropium-albuterol (DUO-NEB) 0 5-2 5 mg/3 mL nebulizer solution Patient was instructed by Physician and understands   ipratropium-albuterol (DUO-NEB) 0 5-2 5 mg/3 mL nebulizer solution Patient was instructed by Physician and understands   meclizine (ANTIVERT) 12 5 MG tablet Patient was instructed by Physician and understands   nystatin (MYCOSTATIN) cream Patient was instructed by Physician and understands   OXYGEN-HELIUM IN Patient was instructed by Physician and understands   pantoprazole (PROTONIX) 40 mg tablet Patient was instructed by Physician and understands   sucralfate (CARAFATE) 1 g/10 mL suspension Patient was instructed by Physician and understands   tamsulosin (FLOMAX) 0 4 mg Patient was instructed by Physician and understands   ZEMAIRA injection Patient was instructed by Physician and understands   zolpidem (AMBIEN) 10 mg tablet Patient was instructed by Physician and understands  Pt to follow Dr Ivette Taylor instructions    daughter Fidelia Urrutia

## 2018-11-20 ENCOUNTER — HOSPITAL ENCOUNTER (OUTPATIENT)
Facility: AMBULARY SURGERY CENTER | Age: 73
Setting detail: OUTPATIENT SURGERY
Discharge: HOME | End: 2018-11-20
Attending: INTERNAL MEDICINE | Admitting: INTERNAL MEDICINE
Payer: MEDICARE

## 2018-11-20 ENCOUNTER — ANESTHESIA (OUTPATIENT)
Dept: GASTROENTEROLOGY | Facility: AMBULARY SURGERY CENTER | Age: 73
End: 2018-11-20
Payer: MEDICARE

## 2018-11-20 VITALS
HEART RATE: 69 BPM | HEIGHT: 77 IN | RESPIRATION RATE: 18 BRPM | WEIGHT: 183 LBS | TEMPERATURE: 97.7 F | SYSTOLIC BLOOD PRESSURE: 142 MMHG | DIASTOLIC BLOOD PRESSURE: 77 MMHG | BODY MASS INDEX: 21.61 KG/M2 | OXYGEN SATURATION: 97 %

## 2018-11-20 DIAGNOSIS — K21.9 GASTROESOPHAGEAL REFLUX DISEASE WITHOUT ESOPHAGITIS: ICD-10-CM

## 2018-11-20 PROCEDURE — 88305 TISSUE EXAM BY PATHOLOGIST: CPT | Performed by: PATHOLOGY

## 2018-11-20 PROCEDURE — 43251 EGD REMOVE LESION SNARE: CPT | Performed by: INTERNAL MEDICINE

## 2018-11-20 RX ORDER — SODIUM CHLORIDE 9 MG/ML
75 INJECTION, SOLUTION INTRAVENOUS CONTINUOUS
Status: DISCONTINUED | OUTPATIENT
Start: 2018-11-20 | End: 2018-11-20 | Stop reason: HOSPADM

## 2018-11-20 RX ORDER — LIDOCAINE HYDROCHLORIDE 10 MG/ML
INJECTION, SOLUTION INFILTRATION; PERINEURAL AS NEEDED
Status: DISCONTINUED | OUTPATIENT
Start: 2018-11-20 | End: 2018-11-20 | Stop reason: SURG

## 2018-11-20 RX ORDER — PROPOFOL 10 MG/ML
INJECTION, EMULSION INTRAVENOUS AS NEEDED
Status: DISCONTINUED | OUTPATIENT
Start: 2018-11-20 | End: 2018-11-20 | Stop reason: SURG

## 2018-11-20 RX ADMIN — PROPOFOL 40 MG: 10 INJECTION, EMULSION INTRAVENOUS at 12:04

## 2018-11-20 RX ADMIN — PROPOFOL 80 MG: 10 INJECTION, EMULSION INTRAVENOUS at 12:01

## 2018-11-20 RX ADMIN — SODIUM CHLORIDE 75 ML/HR: 0.9 INJECTION, SOLUTION INTRAVENOUS at 11:54

## 2018-11-20 RX ADMIN — LIDOCAINE HYDROCHLORIDE 50 MG: 10 INJECTION, SOLUTION INFILTRATION; PERINEURAL at 12:00

## 2018-11-20 RX ADMIN — PROPOFOL 40 MG: 10 INJECTION, EMULSION INTRAVENOUS at 12:06

## 2018-11-20 NOTE — OP NOTE
ESOPHAGOGASTRODUODENOSCOPY    PROCEDURE: EGD/ Polypectomy (Snare Cautery)    INDICATIONS: GERD    POST-OP DIAGNOSIS: See the impression below    SEDATION: Monitored anesthesia care, check anesthesia records    PHYSICAL EXAM:    Vitals:    11/20/18 1211   BP: 117/61   Pulse: 79   Resp: 18   Temp:    SpO2: 94%    Body mass index is 21 7 kg/m²  General: NAD  Heart: S1 & S2 normal, RRR  Lungs: CTA, No rales or rhonchi  Abdomen: Soft, nontender, nondistended, good bowel sounds    CONSENT:  Informed consent was obtained for the procedure, including sedation after explaining the risks and benefits of the procedure  Risks including but not limited to bleeding, perforation, infection, aspiration were discussed in detail  Also explained about less than 100% sensitivity with the exam and other alternatives  PREPARATION:   EKG tracing, pulse oximetry, blood pressure were monitored throughout the procedure  Patient was identified by myself both verbally and by visual inspection of ID band  DESCRIPTION:   Patient was placed in the left lateral decubitus position and was sedated with the above medication  The gastroscope was introduced in to the oropharynx and the esophagus was intubated under direct visualization  Scope was passed down the esophagus up to 2nd part of the duodenum  A careful inspection was made as the gastroscope was withdrawn, including a retroflexed view of the stomach; findings and interventions are described below  FINDINGS:    #1  Esophagus and GEJ- 2 cm sliding hiatal hernia was noted with a 6 mm polyp at the lower part which appear to be a tubular adenoma and this was removed with snare cautery  #2  Stomach- normal mucosa  #3  Duodenum- normal         IMPRESSIONS:      As above    RECOMMENDATIONS:     Check pathology    Continue Protonix  Patient reports having improvement of symptoms on Protonix  COMPLICATIONS:  None; patient tolerated the procedure well            DISPOSITION: PACU           CONDITION: Stable

## 2018-11-20 NOTE — H&P (VIEW-ONLY)
Follow-up Note -  [facility]  Crissy Moore 1945 75 y o  male         Reason:  Follow-up; heartburn, epigastric discomfort    HPI:  69-year-old male with history of Alpha-1 antitrypsin deficiency, COPD, oxygen dependence on 3 L continuous presents for follow-up; he was recently hospitalized a month ago with COPD exacerbation, and was seen as an inpatient by our service because he was reporting worsened GERD symptoms over the last several weeks  ,  As well as some epigastric pain  EGD was deferred for the time being due to his respiratory status, but he did have ultrasound checked which only showed some hepatomegaly with steatosis  At this time, the patient says he is still having mostly the same symptoms, a fairly constant burning sensation in the substernal region, epigastric region, and actually radiating down into the abdomen as well  He says he does have a fair amount of bloating and flatulence  He has been taking Carafate since his recent hospitalization, in addition to the omeprazole twice daily that he has been on for years  He says that his symptoms are generally worse at night and with lying down on his back  He says that when he eats, the symptoms temporarily get better and then become worse  Otherwise he denies any change in bowel habits, denies any diarrhea, constipation, blood or mucus in the stools  REVIEW OF SYSTEMS:      CONSTITUTIONAL: Denies any fever, chills, or rigors  Good appetite, and no recent weight loss  HEENT: No earache or tinnitus  Denies hearing loss or visual disturbances  CARDIOVASCULAR: No chest pain or palpitations  RESPIRATORY: Denies any cough, hemoptysis, shortness of breath or dyspnea on exertion  GASTROINTESTINAL: As noted in the History of Present Illness  GENITOURINARY: No problems with urination  Denies any hematuria or dysuria  NEUROLOGIC: No dizziness or vertigo, denies headaches     MUSCULOSKELETAL: Denies any muscle or joint pain    SKIN: Denies skin rashes or itching  ENDOCRINE: Denies excessive thirst  Denies intolerance to heat or cold  PSYCHOSOCIAL: Denies depression or anxiety  Denies any recent memory loss  Past Medical History:   Diagnosis Date    Anesthesia complication     Difficult to wake up    Arthritis     BPH (benign prostatic hyperplasia)     urinary frequency    Cancer (HCC)     basal cell neck, face    COPD (chronic obstructive pulmonary disease) (HCC)     Full dentures     Hypertension     controlled    Kidney stone     at least 7 episodes    Liver disease     Alpha 1- enzyme deficiency - diagnosed 2002  has been on weekly replacement therapy since then    Pulmonary emphysema ([facility] [address] )     1/25/15  FEV1 - 2 45 liters or 59% of predicted    Wears glasses     for driving only      Past Surgical History:   Procedure Laterality Date    BACK SURGERY  2008    discectomy    COLONOSCOPY      COLONOSCOPY N/A 3/10/2017    Procedure: Yfn Grace;  Surgeon: Andria Abraham MD;  Location: [place] [address] GI LAB; Service:    Lucia Soler      removal of kidney stones    ESOPHAGOGASTRODUODENOSCOPY N/A 3/10/2017    Procedure: ESOPHAGOGASTRODUODENOSCOPY (EGD); Surgeon: Andria Abraham MD;  Location: [facility]; Service:     LITHOTRIPSY      TONSILLECTOMY      VEIN LIGATION AND STRIPPING Bilateral     1980's     Social History     Social History    Marital status:      Spouse name: N/A    Number of children: N/A    Years of education: N/A     Occupational History    Not on file       Social History Main Topics    Smoking status: Former Smoker     Packs/day: 1 00     Years: 60 00     Quit date: 8/31/2017    Smokeless tobacco: Never Used      Comment: quit in august 2017    Alcohol use No    Drug use: No    Sexual activity: Not on file     Other Topics Concern    Not on file     Social History Narrative    No narrative on file     Family History   Problem Relation Age of Onset    Emphysema Mother never smoked    Emphysema Father     Cancer Brother         colon    Colon cancer Brother     Ulcerative colitis Family     Liver disease Family      Chantix [varenicline]  Current Outpatient Prescriptions   Medication Sig Dispense Refill    albuterol (2 5 mg/3 mL) 0 083 % nebulizer solution Inhale 1 each every 4 (four) hours as needed      albuterol (PROVENTIL HFA,VENTOLIN HFA) 90 mcg/act inhaler Inhale 2 puffs 4 (four) times a day 1 Inhaler 5    Alpha1-Proteinase Inhibitor (ZEMAIRA IV) Infuse into a venous catheter once a week On tuesday -1000mg /50 ml       amLODIPine (NORVASC) 10 mg tablet TAKE ONE TABLET BY MOUTH ONE TIME DAILY  60 tablet 5    finasteride (PROSCAR) 5 mg tablet Take 5 mg by mouth daily        fluticasone (FLONASE) 50 mcg/act nasal spray 2 sprays into each nostril daily 16 g 5    fluticasone-salmeterol (ADVAIR DISKUS) 250-50 mcg/dose inhaler Inhale 1 puff 2 (two) times a day        ipratropium-albuterol (DUO-NEB) 0 5-2 5 mg/3 mL nebulizer solution Take 1 vial (3 mL total) by nebulization 4 (four) times a day 60 vial 0    nystatin (MYCOSTATIN) cream Apply topically 2 (two) times a day 30 g 2    omeprazole (PriLOSEC) 40 MG capsule TAKE ONE CAPSULE BY MOUTH TWICE DAILY  60 capsule 5    sucralfate (CARAFATE) 1 g/10 mL suspension Take 10 mL (1 g total) by mouth 4 (four) times a day 420 mL 5    tamsulosin (FLOMAX) 0 4 mg TAKE ONE CAPSULE BY MOUTH ONE TIME DAILY  30 capsule 5    theophylline (ESTEFANIA-24) 400 MG 24 hr capsule Take 1 capsule (400 mg total) by mouth daily 90 capsule 3    zolpidem (AMBIEN) 10 mg tablet TAKE ONE TABLET BY MOUTH AT BEDTIME  30 tablet 5    amLODIPine (NORVASC) 5 mg tablet Take 1 tablet (5 mg total) by mouth daily (Patient not taking: Reported on 11/7/2018 ) 30 tablet 3    ipratropium-albuterol (DUO-NEB) 0 5-2 5 mg/3 mL nebulizer solution Take 1 vial (3 mL total) by nebulization 3 (three) times a day (Patient not taking: Reported on 11/7/2018 ) 270 mL 5  meclizine (ANTIVERT) 12 5 MG tablet Take 1 tablet by mouth daily      predniSONE 10 mg tablet 60mg QD x3 days, 40mg QD x3 days, 20mg QD x3 days, 10mg QD x3 days, 5mg (1/2 tablet) QD x3 days (Patient not taking: Reported on 11/7/2018 ) 42 tablet 0    ZEMAIRA injection        No current facility-administered medications for this visit  Blood pressure 148/62, pulse 78, temperature 98 3 °F (36 8 °C), temperature source Tympanic, height 6' 5" (1 956 m), weight 83 4 kg (183 lb 12 8 oz)  PHYSICAL EXAM:      General Appearance:   Alert, on oxygen 3L, ambulates with cane, cooperative, no distress, appears stated age    HEENT:   Normocephalic, atraumatic, anicteric      Neck:  Supple, symmetrical, trachea midline, no adenopathy;    thyroid: no enlargement/tenderness/nodules; no carotid  bruit or JVD    Lungs:   Clear to auscultation bilaterally; no rales, rhonchi or wheezing; respirations unlabored    Heart[de-identified]   S1 and S2 normal; regular rate and rhythm; no murmur, rub, or gallop     Abdomen:   Soft, non-tender, non-distended; normal bowel sounds; no masses, no organomegaly    Extremities: No edema, erythema, wounds, rashes   Rectal:   Deferred                      Lab Results   Component Value Date    WBC 8 69 10/17/2018    HGB 13 4 10/17/2018    HCT 39 0 10/17/2018    MCV 95 10/17/2018     10/17/2018     Lab Results   Component Value Date    GLUCOSE 89 09/27/2016    CALCIUM 8 5 10/17/2018     09/27/2016    K 4 3 10/17/2018    CO2 25 10/17/2018     10/17/2018    BUN 33 (H) 10/17/2018    CREATININE 0 94 10/17/2018     Lab Results   Component Value Date    ALT 36 10/11/2018    AST 19 10/11/2018    ALKPHOS 71 10/11/2018    BILITOT 1 0 09/27/2016     Lab Results   Component Value Date    INR 1 03 10/11/2018    INR 1 08 03/29/2018    INR 1 06 02/14/2018    PROTIME 10 8 10/11/2018    PROTIME 11 4 03/29/2018    PROTIME 11 1 02/14/2018       Xr Chest 1 View Portable    Result Date: 10/11/2018  Impression: Left basilar streaky density possibly subsegmental atelectasis  Workstation performed: [id number]     Cta Chest Ct Abdomen Pelvis W Contrast    Result Date: 10/11/2018  Impression: No evidence of pulmonary embolus Several small pulmonary nodules  New left upper lobe pulmonary nodule  Follow-up CT scan in 3-6 months is recommended  The study was marked in EPIC for significant notification  Workstation performed: [id number]     Us Abdomen Limited    Result Date: 10/16/2018  Impression: 1  No acute abnormality  2   Hepatomegaly with moderately increased liver echotexture likely representing hepatic steatosis  Workstation performed: [id number]       ASSESSMENT & PLAN:    Gastroesophageal reflux disease without esophagitis  Patient reports he has been on omeprazole 40 mg twice daily for years, and so was had worsening symptoms over the last several weeks  Characterized by heartburn and epigastric pain with burning sensation  Rule out esophageal ulcerations, Hyatt's or malignancy     - We'll change omeprazole to pantoprazole, continue same dosage 40 mg twice daily    -  Patient was explained about the lifestyle and dietary modifications  Advised to avoid fatty foods, chocolates, caffeine, alcohol and any other triggering foods  Avoid eating for at least 3 hours before going to bed  - Will plan for EGD    -  Patient was explained about  the risks and benefits of the procedure  Risks including but not limited to bleeding, infection, perforation were explained in detail  Also explained about less than 100% sensitivity with the exam and other alternatives  - Regarding his symptom of bloating, I advised he can try a trial of lactose restriction for a few weeks    If this is ineffective, we can advise about other dietary alternatives (e g FODMAP, etc)

## 2018-12-03 ENCOUNTER — PATIENT OUTREACH (OUTPATIENT)
Dept: OTHER | Facility: HOSPITAL | Age: 73
End: 2018-12-03

## 2018-12-13 ENCOUNTER — OFFICE VISIT (OUTPATIENT)
Dept: FAMILY MEDICINE CLINIC | Facility: CLINIC | Age: 73
End: 2018-12-13
Payer: MEDICARE

## 2018-12-13 ENCOUNTER — TELEPHONE (OUTPATIENT)
Dept: CASE MANAGEMENT | Facility: OTHER | Age: 73
End: 2018-12-13

## 2018-12-13 VITALS
HEART RATE: 98 BPM | BODY MASS INDEX: 21.73 KG/M2 | RESPIRATION RATE: 18 BRPM | SYSTOLIC BLOOD PRESSURE: 146 MMHG | OXYGEN SATURATION: 96 % | HEIGHT: 77 IN | TEMPERATURE: 97.9 F | WEIGHT: 184 LBS | DIASTOLIC BLOOD PRESSURE: 70 MMHG

## 2018-12-13 DIAGNOSIS — J96.11 CHRONIC RESPIRATORY FAILURE WITH HYPOXIA, ON HOME O2 THERAPY (HCC): Primary | ICD-10-CM

## 2018-12-13 DIAGNOSIS — E88.01 AAT (ALPHA-1-ANTITRYPSIN) DEFICIENCY (HCC): ICD-10-CM

## 2018-12-13 DIAGNOSIS — L98.9 SKIN LESION OF RIGHT LOWER EXTREMITY: ICD-10-CM

## 2018-12-13 DIAGNOSIS — I10 ESSENTIAL HYPERTENSION: ICD-10-CM

## 2018-12-13 DIAGNOSIS — G62.9 NEUROPATHY: ICD-10-CM

## 2018-12-13 DIAGNOSIS — Z99.81 CHRONIC RESPIRATORY FAILURE WITH HYPOXIA, ON HOME O2 THERAPY (HCC): Primary | ICD-10-CM

## 2018-12-13 PROCEDURE — 99214 OFFICE O/P EST MOD 30 MIN: CPT | Performed by: FAMILY MEDICINE

## 2018-12-13 RX ORDER — GABAPENTIN 100 MG/1
100 CAPSULE ORAL 3 TIMES DAILY
Qty: 30 CAPSULE | Refills: 3 | Status: SHIPPED | OUTPATIENT
Start: 2018-12-13 | End: 2019-02-13 | Stop reason: SDUPTHER

## 2018-12-13 NOTE — PROGRESS NOTES
Assessment/Plan:    Will add gabapentin 100 hs and titrate  Monitor effects  Cont meds for copd and htn f/u with pulm  Schedule shave excision of lesion left ankle if no resolution with bacitracin     Diagnoses and all orders for this visit:    Chronic respiratory failure with hypoxia, on home O2 therapy (HCC)    AAT (alpha-1-antitrypsin) deficiency ([facility])    Neuropathy  -     gabapentin (NEURONTIN) 100 mg capsule; Take 1 capsule (100 mg total) by mouth 3 (three) times a day    Essential hypertension          Subjective:      Patient ID: Elvira Moeller is a 75 y o  male  Patient seen in f/u  Breathing stble but severely compromised function  Has seen pulm  Having some pain and tingling in extremities  Also notes scbbed lesion rt inner ankle needs paperwork filled out for ins otherwise okay  Okay on meds        The following portions of the patient's history were reviewed and updated as appropriate: past family history, past social history and past surgical history  Review of Systems   Constitutional: Positive for activity change and fatigue  HENT: Negative  Eyes: Negative  Respiratory:        See hpi   Cardiovascular: Negative  Gastrointestinal: Negative  Endocrine: Negative  Musculoskeletal: Negative  Skin:        See hpi   Neurological:        See hpi   Psychiatric/Behavioral: Negative  Objective:      /70 (BP Location: Left arm, Patient Position: Sitting, Cuff Size: Standard)   Pulse 98   Temp 97 9 °F (36 6 °C) (Tympanic)   Resp 18   Ht 6' 5" (1 956 m)   Wt 83 5 kg (184 lb)   SpO2 96% Comment: with O2  BMI 21 82 kg/m²          Physical Exam   Constitutional: He is oriented to person, place, and time  Thin male   HENT:   Head: Normocephalic and atraumatic  Right Ear: External ear normal    Left Ear: External ear normal    Eyes: Pupils are equal, round, and reactive to light  Conjunctivae are normal    Neck: Normal range of motion  Neck supple     Cardiovascular: Normal rate, regular rhythm and normal heart sounds  Pulmonary/Chest: Effort normal    Dec bs bessie   Abdominal: Soft  Bowel sounds are normal    Musculoskeletal: Normal range of motion  Neurological: He is alert and oriented to person, place, and time  Skin:   1 cm raised hyperkeratotic lesion right medial shin   Psychiatric: He has a normal mood and affect   His behavior is normal

## 2018-12-18 ENCOUNTER — TELEPHONE (OUTPATIENT)
Dept: FAMILY MEDICINE CLINIC | Facility: CLINIC | Age: 73
End: 2018-12-18

## 2018-12-18 NOTE — TELEPHONE ENCOUNTER
DR Nicole Bass - PT WAS TOLD TO CALL YOU AND LET YOU KNOW HOW GABAPENTIN IS DOING FOR HIM  HE SAYS HE STILL HAS NUMBNESS AND TINGLING IN HANDS AND FEET  IT'S NOT WORKING VERY WELL  HE WOULD LIKE TO SPEAK TO YOU

## 2018-12-20 ENCOUNTER — TRANSCRIBE ORDERS (OUTPATIENT)
Dept: ADMINISTRATIVE | Facility: HOSPITAL | Age: 73
End: 2018-12-20

## 2018-12-20 ENCOUNTER — APPOINTMENT (OUTPATIENT)
Dept: LAB | Facility: HOSPITAL | Age: 73
End: 2018-12-20
Attending: UROLOGY
Payer: MEDICARE

## 2018-12-20 DIAGNOSIS — R97.20 ELEVATED PSA: Primary | ICD-10-CM

## 2018-12-20 DIAGNOSIS — R97.20 ELEVATED PSA: ICD-10-CM

## 2018-12-20 LAB — PSA SERPL-MCNC: 0.5 NG/ML (ref 0–4)

## 2018-12-20 PROCEDURE — 84153 ASSAY OF PSA TOTAL: CPT

## 2018-12-27 ENCOUNTER — PROCEDURE VISIT (OUTPATIENT)
Dept: FAMILY MEDICINE CLINIC | Facility: CLINIC | Age: 73
End: 2018-12-27
Payer: MEDICARE

## 2018-12-27 ENCOUNTER — TELEPHONE (OUTPATIENT)
Dept: PULMONOLOGY | Facility: MEDICAL CENTER | Age: 73
End: 2018-12-27

## 2018-12-27 VITALS
DIASTOLIC BLOOD PRESSURE: 84 MMHG | OXYGEN SATURATION: 96 % | TEMPERATURE: 98 F | BODY MASS INDEX: 21.84 KG/M2 | WEIGHT: 185 LBS | HEART RATE: 91 BPM | SYSTOLIC BLOOD PRESSURE: 148 MMHG | RESPIRATION RATE: 16 BRPM | HEIGHT: 77 IN

## 2018-12-27 DIAGNOSIS — L98.9 SKIN LESION OF RIGHT LOWER EXTREMITY: Primary | ICD-10-CM

## 2018-12-27 PROCEDURE — 99213 OFFICE O/P EST LOW 20 MIN: CPT | Performed by: FAMILY MEDICINE

## 2018-12-27 NOTE — PROGRESS NOTES
Assessment/Plan:    Will monitor lesion if enlarges or starts to breakdown will shave and send for bx     Diagnoses and all orders for this visit:    Skin lesion of right lower extremity          Subjective:      Patient ID: Valorie Buchanan is a 75 y o  male  Patient originally scheduled for shave bx however lesion healed and is now a smooth round raised scar like formation  No other new issues okay on meds        The following portions of the patient's history were reviewed and updated as appropriate: past family history, past medical history, past social history and past surgical history  Review of Systems   Constitutional: Negative  Respiratory:        No change   Skin:        See hpi         Objective:      /84 (BP Location: Left arm, Patient Position: Sitting, Cuff Size: Standard)   Pulse 91   Temp 98 °F (36 7 °C) (Tympanic)   Resp 16   Ht 6' 5" (1 956 m)   Wt 83 9 kg (185 lb)   SpO2 96% Comment: with oxygen  BMI 21 94 kg/m²          Physical Exam   Constitutional: He appears well-developed and well-nourished  HENT:   Head: Normocephalic and atraumatic  Right Ear: External ear normal    Left Ear: External ear normal    Eyes: Pupils are equal, round, and reactive to light  Neck: Normal range of motion     Pulmonary/Chest: Effort normal    Skin:   1 cm slightly oval raised smooth papule rt medial shin

## 2019-01-07 DIAGNOSIS — F51.01 PRIMARY INSOMNIA: ICD-10-CM

## 2019-01-07 RX ORDER — ZOLPIDEM TARTRATE 10 MG/1
TABLET ORAL
Qty: 30 TABLET | Refills: 5 | OUTPATIENT
Start: 2019-01-07 | End: 2019-07-27 | Stop reason: SDUPTHER

## 2019-01-14 ENCOUNTER — PATIENT OUTREACH (OUTPATIENT)
Dept: CASE MANAGEMENT | Facility: OTHER | Age: 74
End: 2019-01-14

## 2019-01-14 NOTE — PROGRESS NOTES
Patient is doing well, continues to wear oxygen at 2L and will increase it to 3L if he becomes WALKER  Patient aware of upcoming appointment with pulmonary in February  No questions or concerns at this time and patient is agreeable to having me discontinue outreach as bundle episode has come to a close

## 2019-01-17 ENCOUNTER — TELEPHONE (OUTPATIENT)
Dept: FAMILY MEDICINE CLINIC | Facility: CLINIC | Age: 74
End: 2019-01-17

## 2019-01-24 DIAGNOSIS — K21.9 GASTROESOPHAGEAL REFLUX DISEASE, ESOPHAGITIS PRESENCE NOT SPECIFIED: ICD-10-CM

## 2019-01-28 RX ORDER — SUCRALFATE 1 G/10ML
SUSPENSION ORAL
Qty: 420 ML | Refills: 4 | Status: SHIPPED | OUTPATIENT
Start: 2019-01-28 | End: 2019-07-09 | Stop reason: ALTCHOICE

## 2019-02-03 DIAGNOSIS — R35.0 BENIGN PROSTATIC HYPERPLASIA WITH URINARY FREQUENCY: ICD-10-CM

## 2019-02-03 DIAGNOSIS — N40.1 BENIGN PROSTATIC HYPERPLASIA WITH URINARY FREQUENCY: ICD-10-CM

## 2019-02-06 DIAGNOSIS — K21.9 GASTROESOPHAGEAL REFLUX DISEASE WITHOUT ESOPHAGITIS: ICD-10-CM

## 2019-02-06 RX ORDER — PANTOPRAZOLE SODIUM 40 MG/1
TABLET, DELAYED RELEASE ORAL
Qty: 60 TABLET | Refills: 1 | Status: SHIPPED | OUTPATIENT
Start: 2019-02-06 | End: 2019-03-11 | Stop reason: SDUPTHER

## 2019-02-06 RX ORDER — TAMSULOSIN HYDROCHLORIDE 0.4 MG/1
CAPSULE ORAL
Qty: 30 CAPSULE | Refills: 4 | Status: ON HOLD | OUTPATIENT
Start: 2019-02-06 | End: 2019-07-03 | Stop reason: SDUPTHER

## 2019-02-11 ENCOUNTER — OFFICE VISIT (OUTPATIENT)
Dept: PULMONOLOGY | Facility: MEDICAL CENTER | Age: 74
End: 2019-02-11
Payer: MEDICARE

## 2019-02-11 VITALS
OXYGEN SATURATION: 95 % | HEART RATE: 82 BPM | SYSTOLIC BLOOD PRESSURE: 158 MMHG | RESPIRATION RATE: 12 BRPM | HEIGHT: 77 IN | BODY MASS INDEX: 22.67 KG/M2 | DIASTOLIC BLOOD PRESSURE: 82 MMHG | TEMPERATURE: 98.3 F | WEIGHT: 192 LBS

## 2019-02-11 DIAGNOSIS — J96.11 CHRONIC RESPIRATORY FAILURE WITH HYPOXIA (HCC): ICD-10-CM

## 2019-02-11 DIAGNOSIS — I27.82 OTHER CHRONIC PULMONARY EMBOLISM WITHOUT ACUTE COR PULMONALE (HCC): Primary | ICD-10-CM

## 2019-02-11 DIAGNOSIS — R06.02 SHORTNESS OF BREATH: ICD-10-CM

## 2019-02-11 DIAGNOSIS — J43.2 CENTRILOBULAR EMPHYSEMA (HCC): ICD-10-CM

## 2019-02-11 PROCEDURE — 99214 OFFICE O/P EST MOD 30 MIN: CPT | Performed by: NURSE PRACTITIONER

## 2019-02-11 RX ORDER — ALPRAZOLAM 0.25 MG/1
0.5 TABLET ORAL 2 TIMES DAILY PRN
Qty: 60 TABLET | Refills: 0 | Status: SHIPPED | OUTPATIENT
Start: 2019-02-11 | End: 2019-05-21

## 2019-02-11 NOTE — ASSESSMENT & PLAN NOTE
Patient has a history of pulmonary embolism  They are resolved  He follows with Dr Soy Nash, hematologist   He is now off anticoagulation as he completed 6 months of Eliquis  If he would ever have another pulmonary embolism he would require anticoagulation for life

## 2019-02-11 NOTE — ASSESSMENT & PLAN NOTE
Earl Mixon continues to use supplemental oxygen  Room air oxygen with 2 L at rest is 94%  He is using 3 L and benefitting  He now uses portable concentrator while he is out of the house and otherwise uses concentrated oxygen    He is status

## 2019-02-11 NOTE — PROGRESS NOTES
Assessment/Plan:      Problem List Items Addressed This Visit        Respiratory    Pulmonary embolism ([facility] [address] ) - Primary     Patient has a history of pulmonary embolism  They are resolved  He follows with Dr Gifty Alvarado, hematologist   He is now off anticoagulation as he completed 6 months of Eliquis  If he would ever have another pulmonary embolism he would require anticoagulation for life  Relevant Orders    CTA chest pe study    Chronic respiratory failure with hypoxia ([facility] [address] )     Cory Bender continues to use supplemental oxygen  Room air oxygen with 2 L at rest is 94%  He is using 3 L and benefitting  He now uses portable concentrator while he is out of the house and otherwise uses concentrated oxygen  He is status         Centrilobular emphysema ([facility] [address] )     Patient has centrilobular emphysema seen on CT of chest   He also has a history of alpha-1 antitrypsin deficiency  Last FEV1 was 1 61 L or 40% of predicted  I am giving him today a sample of Trelegy 1 puff daily  He will use this in place of Advair 250/51 puff b i d  He is aware that this new medication will be use once daily  Will continue to rinse his mouth after inhalation  Additionally he is also aware that he has nebulizer solution with duo nebs that contains an anticholinergic  However he is able to continue to use the DuoNeb for at least 2-4 weeks  If he feels the Trelegy is effective, I will order then albuterol for his nebulizer  Relevant Medications    fluticasone-umeclidinium-vilanterol (TRELEGY ELLIPTA) 100-62 5-25 MCG/INH inhaler      Other Visit Diagnoses     Shortness of breath        Relevant Medications    ALPRAZolam (XANAX) 0 25 mg tablet          HPI:  Cory Bender is a 50-year-old male with centrilobular emphysema as well as alpha 1 anti trypsin deficiency  Past pulmonary function test revealed FEV1 of 1 61 L or 40% of predicted  He has been using Advair 250/51 puff b i d  And also Duo nebs 3 times a day    He is also using supplemental oxygen for diagnosis of chronic respiratory failure with hypoxia  Return in about 3 months (around 5/11/2019)  All questions are answered to the patient's satisfaction and understanding  He verbalizes understanding  He is encouraged to call with any further questions or concerns  Portions of the record may have been created with voice recognition software  Occasional wrong word or "sound a like" substitutions may have occurred due to the inherent limitations of voice recognition software  Read the chart carefully and recognize, using context, where substitutions have occurred  Electronically Signed by TOPHER Grant    ______________________________________________________________________    Chief Complaint:   Chief Complaint   Patient presents with    Shortness of Breath     pt states that  breathing gets worse during the afternoons     Cough     occasionall    Wheezing     also has chest tightness at night but better in am       Patient ID: Alona Maddox is a 75 y o  y o  male has a past medical history of Anesthesia complication, Arthritis, BPH (benign prostatic hyperplasia), Cancer ([address] ), COPD (chronic obstructive pulmonary disease) ([address] ), Full dentures, Hypertension, Irritable bowel syndrome, Kidney stone, Liver disease, Pulmonary emphysema ([address] ), RSV infection (12/2017), and Wears glasses  2/11/2019  Patient presents today for follow-up visit  Shortness of Breath   This is a chronic problem  The current episode started more than 1 year ago  The problem occurs daily  The problem has been waxing and waning  Associated symptoms include leg swelling  The symptoms are aggravated by any activity  The patient has no known risk factors for DVT/PE  He has tried beta agonist inhalers, ipratropium inhalers, rest and steroid inhalers for the symptoms  The treatment provided mild relief  His past medical history is significant for chronic lung disease         Review of Systems Constitutional: Negative  HENT: Negative  Eyes: Negative  Respiratory: Positive for shortness of breath  Cardiovascular: Positive for leg swelling  Gastrointestinal: Negative  Endocrine: Negative  Genitourinary: Negative  Musculoskeletal: Negative  Skin: Negative  Allergic/Immunologic: Negative  Neurological: Negative  Hematological: Negative  Psychiatric/Behavioral: Negative  Smoking history: He reports that he quit smoking about 17 months ago  He has a 60 00 pack-year smoking history   He has never used smokeless tobacco     The following portions of the patient's history were reviewed and updated as appropriate: allergies, current medications, past family history, past medical history, past social history, past surgical history and problem list     Immunization History   Administered Date(s) Administered    INFLUENZA 09/26/2018    Influenza TIV (IM) 09/27/2013, 10/23/2014    Pneumococcal Conjugate 13-Valent 04/26/2016    Pneumococcal Polysaccharide PPV23 04/23/2011    Tdap 04/26/2016    Zoster 10/29/2014, 04/27/2015, 07/24/2018     Current Outpatient Medications   Medication Sig Dispense Refill    albuterol (2 5 mg/3 mL) 0 083 % nebulizer solution Inhale 1 each every 4 (four) hours as needed      albuterol (PROVENTIL HFA,VENTOLIN HFA) 90 mcg/act inhaler Inhale 2 puffs 4 (four) times a day (Patient taking differently: Inhale 2 puffs every 6 (six) hours as needed  ) 1 Inhaler 5    amLODIPine (NORVASC) 10 mg tablet TAKE ONE TABLET BY MOUTH ONE TIME DAILY  60 tablet 5    CARAFATE 1 GM/10ML suspension TAKE 10 ML (1G TOTAL) BY MOUTH 4 (FOUR) TIMES A  mL 4    finasteride (PROSCAR) 5 mg tablet Take 5 mg by mouth every morning        fluticasone (FLONASE) 50 mcg/act nasal spray 2 sprays into each nostril daily 16 g 5    fluticasone-salmeterol (ADVAIR DISKUS) 250-50 mcg/dose inhaler Inhale 1 puff 2 (two) times a day        ipratropium-albuterol (DUO-NEB) 0 5-2 5 mg/3 mL nebulizer solution Take 1 vial (3 mL total) by nebulization 3 (three) times a day 270 mL 5    meclizine (ANTIVERT) 12 5 MG tablet Take 1 tablet by mouth as needed        nystatin (MYCOSTATIN) cream Apply topically 2 (two) times a day (Patient taking differently: Apply topically as needed  ) 30 g 2    OXYGEN-HELIUM IN Inhale 2L at rest- 3L with activity      pantoprazole (PROTONIX) 40 mg tablet take 1 tablet by mouth twice daily  60 tablet 1    tamsulosin (FLOMAX) 0 4 mg TAKE ONE CAPSULE BY MOUTH ONE TIME DAILY  30 capsule 4    ZEMAIRA injection Once a week-       zolpidem (AMBIEN) 10 mg tablet TAKE ONE TABLET BY MOUTH NIGHTLY AT BEDTIME  30 tablet 5    ALPRAZolam (XANAX) 0 25 mg tablet Take 2 tablets (0 5 mg total) by mouth 2 (two) times a day as needed for anxiety for up to 30 days 60 tablet 0    fluticasone-umeclidinium-vilanterol (TRELEGY ELLIPTA) 100-62 5-25 MCG/INH inhaler Inhale 1 puff daily for 30 days Rinse mouth after use  1 Inhaler 5    gabapentin (NEURONTIN) 100 mg capsule Take 1 capsule (100 mg total) by mouth 3 (three) times a day 30 capsule 3    ipratropium-albuterol (DUO-NEB) 0 5-2 5 mg/3 mL nebulizer solution Take 1 vial (3 mL total) by nebulization 4 (four) times a day (Patient not taking: Reported on 2/11/2019) 60 vial 0     No current facility-administered medications for this visit  Allergies: Chantix [varenicline]    Objective:  Vitals:    02/11/19 0837 02/11/19 0841   BP: 158/82    BP Location: Left arm    Patient Position: Sitting    Cuff Size: Standard    Pulse: 82    Resp: 12    Temp: 98 3 °F (36 8 °C)    TempSrc: Tympanic    SpO2: 95% 95%   Weight: 87 1 kg (192 lb)    Height: 6' 5" (1 956 m)    Oxygen Therapy  SpO2: 95 %  Oxygen Therapy: Supplemental oxygen  O2 Delivery Method: Nasal cannula  O2 Flow Rate (L/min): 2 L/min      Wt Readings from Last 3 Encounters:   02/11/19 87 1 kg (192 lb)   12/27/18 83 9 kg (185 lb)   12/13/18 83 5 kg (184 lb)     Body mass index is 22 77 kg/m²  Physical Exam   Constitutional: He is oriented to person, place, and time  He appears well-developed and well-nourished  No distress  HENT:   Head: Normocephalic and atraumatic  Nose: Nose normal    Mouth/Throat: Oropharynx is clear and moist  No oropharyngeal exudate  Eyes: Pupils are equal, round, and reactive to light  Conjunctivae are normal    Neck: Normal range of motion  Neck supple  No JVD present  No tracheal deviation present  No thyromegaly present  Cardiovascular: Normal rate, regular rhythm and normal heart sounds  No murmur heard  Pulmonary/Chest: Effort normal  Tachypnea noted  No respiratory distress  He has wheezes  He has no rales  Abdominal: Soft  He exhibits no distension  There is no tenderness  There is no guarding  Musculoskeletal: Normal range of motion  He exhibits no edema  Lymphadenopathy:     He has no cervical adenopathy  Neurological: He is alert and oriented to person, place, and time  Skin: Skin is warm and dry  No rash noted  Psychiatric: He has a normal mood and affect   His behavior is normal  Thought content normal        Lab Review:   Appointment on 12/20/2018   Component Date Value    PSA, Diagnostic 12/20/2018 0 5    Admission on 11/20/2018, Discharged on 11/20/2018   Component Date Value    Case Report 11/20/2018                      Value:Surgical Pathology Report                         Case: [id number]                                   Authorizing Provider:  Andria Abraham MD          Collected:           11/20/2018 1205              Ordering Location:     [facility]   Received:            11/20/2018 [facility]                                                                       Pathologist:           Gregorio Dobson MD                                                   Specimen:    Polyp, Stomach/Small Intestine, 1  hot snare gadstric polyp  Final Diagnosis 11/20/2018                      Value: This result contains rich text formatting which cannot be displayed here   Additional Information 11/20/2018                      Value: This result contains rich text formatting which cannot be displayed here  [employer] Description 11/20/2018                      Value: This result contains rich text formatting which cannot be displayed here  Admission on 10/11/2018, Discharged on 10/17/2018   No results displayed because visit has over 200 results  Diagnostics:  I have personally reviewed pertinent films in PACS  CT a of chest abdomen and pelvis October 2018 reviewed  Office Spirometry Results:     ESS:    No results found

## 2019-02-11 NOTE — ASSESSMENT & PLAN NOTE
Patient has centrilobular emphysema seen on CT of chest   He also has a history of alpha-1 antitrypsin deficiency  Last FEV1 was 1 61 L or 40% of predicted  I am giving him today a sample of Trelegy 1 puff daily  He will use this in place of Advair 250/51 puff b i d  He is aware that this new medication will be use once daily  Will continue to rinse his mouth after inhalation  Additionally he is also aware that he has nebulizer solution with duo nebs that contains an anticholinergic  However he is able to continue to use the DuoNeb for at least 2-4 weeks  If he feels the Trelegy is effective, I will order then albuterol for his nebulizer

## 2019-02-11 NOTE — PATIENT INSTRUCTIONS
U will try to use a new medication called Trelegy 1 puff daily  This will take the place of year Advair 250/51 puff twice per day  U will have a CAT scan a year chest in April 2019  This will look at pulmonary nodules as well this to be certain there is no blood clot

## 2019-02-11 NOTE — ASSESSMENT & PLAN NOTE
Patient had last CTA of chest done October of 2018  This was compared to CT of chest done June of 2018  No pulmonary embolus was seen  There are several small pulmonary nodules that are visualized  Right upper lobe is 8 mm, there is a form mm right lower lobe nodule, 2 mm left lower lobe unchanged  There is also a new pulmonary nodule in the left upper lobe measuring 5 mm  Follow-up CT is scheduled for April 22, 2019  I will now order a follow-up CTA as he does have a history of pulmonary embolism, and the CT of chest will be ordered

## 2019-02-13 DIAGNOSIS — G62.9 NEUROPATHY: ICD-10-CM

## 2019-02-20 RX ORDER — GABAPENTIN 100 MG/1
CAPSULE ORAL
Qty: 30 CAPSULE | Refills: 2 | Status: SHIPPED | OUTPATIENT
Start: 2019-02-20 | End: 2019-04-29 | Stop reason: SDUPTHER

## 2019-03-11 DIAGNOSIS — K21.9 GASTROESOPHAGEAL REFLUX DISEASE WITHOUT ESOPHAGITIS: ICD-10-CM

## 2019-03-11 RX ORDER — PANTOPRAZOLE SODIUM 40 MG/1
TABLET, DELAYED RELEASE ORAL
Qty: 60 TABLET | Refills: 2 | Status: ON HOLD | OUTPATIENT
Start: 2019-03-11 | End: 2019-07-03 | Stop reason: SDUPTHER

## 2019-04-01 ENCOUNTER — TELEPHONE (OUTPATIENT)
Dept: PULMONOLOGY | Facility: MEDICAL CENTER | Age: 74
End: 2019-04-01

## 2019-04-01 DIAGNOSIS — J43.2 CENTRILOBULAR EMPHYSEMA (HCC): Primary | ICD-10-CM

## 2019-04-03 ENCOUNTER — TRANSCRIBE ORDERS (OUTPATIENT)
Dept: ADMINISTRATIVE | Facility: HOSPITAL | Age: 74
End: 2019-04-03

## 2019-04-03 ENCOUNTER — APPOINTMENT (OUTPATIENT)
Dept: LAB | Facility: HOSPITAL | Age: 74
End: 2019-04-03
Payer: MEDICARE

## 2019-04-03 ENCOUNTER — TELEPHONE (OUTPATIENT)
Dept: FAMILY MEDICINE CLINIC | Facility: CLINIC | Age: 74
End: 2019-04-03

## 2019-04-03 DIAGNOSIS — G62.9 NEUROPATHY: ICD-10-CM

## 2019-04-03 DIAGNOSIS — J43.2 CENTRILOBULAR EMPHYSEMA (HCC): ICD-10-CM

## 2019-04-03 LAB
ANION GAP SERPL CALCULATED.3IONS-SCNC: 11 MMOL/L (ref 4–13)
BUN SERPL-MCNC: 13 MG/DL (ref 5–25)
CALCIUM SERPL-MCNC: 9 MG/DL (ref 8.3–10.1)
CHLORIDE SERPL-SCNC: 107 MMOL/L (ref 100–108)
CO2 SERPL-SCNC: 27 MMOL/L (ref 21–32)
CREAT SERPL-MCNC: 1.12 MG/DL (ref 0.6–1.3)
GFR SERPL CREATININE-BSD FRML MDRD: 64 ML/MIN/1.73SQ M
GLUCOSE P FAST SERPL-MCNC: 105 MG/DL (ref 65–99)
POTASSIUM SERPL-SCNC: 3.9 MMOL/L (ref 3.5–5.3)
SODIUM SERPL-SCNC: 145 MMOL/L (ref 136–145)

## 2019-04-03 PROCEDURE — 36415 COLL VENOUS BLD VENIPUNCTURE: CPT

## 2019-04-03 PROCEDURE — 80048 BASIC METABOLIC PNL TOTAL CA: CPT

## 2019-04-03 RX ORDER — GABAPENTIN 100 MG/1
100 CAPSULE ORAL 3 TIMES DAILY
Qty: 30 CAPSULE | Refills: 2 | Status: SHIPPED | OUTPATIENT
Start: 2019-04-03 | End: 2019-04-29 | Stop reason: DRUGHIGH

## 2019-04-11 ENCOUNTER — HOSPITAL ENCOUNTER (OUTPATIENT)
Dept: RADIOLOGY | Facility: HOSPITAL | Age: 74
Discharge: HOME | End: 2019-04-11
Payer: MEDICARE

## 2019-04-11 DIAGNOSIS — I27.82 OTHER CHRONIC PULMONARY EMBOLISM WITHOUT ACUTE COR PULMONALE (HCC): ICD-10-CM

## 2019-04-11 PROCEDURE — 71275 CT ANGIOGRAPHY CHEST: CPT

## 2019-04-11 RX ADMIN — IOHEXOL 85 ML: 350 INJECTION, SOLUTION INTRAVENOUS at 08:34

## 2019-04-17 ENCOUNTER — TELEPHONE (OUTPATIENT)
Dept: PULMONOLOGY | Facility: CLINIC | Age: 74
End: 2019-04-17

## 2019-04-22 ENCOUNTER — OFFICE VISIT (OUTPATIENT)
Dept: FAMILY MEDICINE CLINIC | Facility: CLINIC | Age: 74
End: 2019-04-22
Payer: MEDICARE

## 2019-04-22 VITALS
SYSTOLIC BLOOD PRESSURE: 152 MMHG | BODY MASS INDEX: 22.53 KG/M2 | TEMPERATURE: 98.3 F | WEIGHT: 190 LBS | OXYGEN SATURATION: 94 % | HEART RATE: 84 BPM | DIASTOLIC BLOOD PRESSURE: 80 MMHG

## 2019-04-22 DIAGNOSIS — E88.01 AAT (ALPHA-1-ANTITRYPSIN) DEFICIENCY (HCC): ICD-10-CM

## 2019-04-22 DIAGNOSIS — I27.82 OTHER CHRONIC PULMONARY EMBOLISM WITH ACUTE COR PULMONALE (HCC): ICD-10-CM

## 2019-04-22 DIAGNOSIS — J44.9 CHRONIC OBSTRUCTIVE PULMONARY DISEASE, UNSPECIFIED COPD TYPE (HCC): Primary | ICD-10-CM

## 2019-04-22 DIAGNOSIS — I26.09 OTHER CHRONIC PULMONARY EMBOLISM WITH ACUTE COR PULMONALE (HCC): ICD-10-CM

## 2019-04-22 PROCEDURE — 99213 OFFICE O/P EST LOW 20 MIN: CPT | Performed by: FAMILY MEDICINE

## 2019-04-22 RX ORDER — METHYLPREDNISOLONE 4 MG/1
TABLET ORAL
Qty: 21 EACH | Refills: 0 | Status: SHIPPED | OUTPATIENT
Start: 2019-04-22 | End: 2019-05-21

## 2019-04-23 ENCOUNTER — TELEPHONE (OUTPATIENT)
Dept: FAMILY MEDICINE CLINIC | Facility: CLINIC | Age: 74
End: 2019-04-23

## 2019-04-27 DIAGNOSIS — G62.9 NEUROPATHY: ICD-10-CM

## 2019-04-29 ENCOUNTER — OFFICE VISIT (OUTPATIENT)
Dept: FAMILY MEDICINE CLINIC | Facility: CLINIC | Age: 74
End: 2019-04-29
Payer: MEDICARE

## 2019-04-29 VITALS
DIASTOLIC BLOOD PRESSURE: 64 MMHG | BODY MASS INDEX: 21.6 KG/M2 | TEMPERATURE: 97.6 F | OXYGEN SATURATION: 95 % | SYSTOLIC BLOOD PRESSURE: 134 MMHG | WEIGHT: 182.13 LBS | HEART RATE: 92 BPM

## 2019-04-29 DIAGNOSIS — E88.01 AAT (ALPHA-1-ANTITRYPSIN) DEFICIENCY (HCC): ICD-10-CM

## 2019-04-29 DIAGNOSIS — J43.2 CENTRILOBULAR EMPHYSEMA (HCC): ICD-10-CM

## 2019-04-29 DIAGNOSIS — G62.9 NEUROPATHY: Primary | ICD-10-CM

## 2019-04-29 PROCEDURE — 99213 OFFICE O/P EST LOW 20 MIN: CPT | Performed by: FAMILY MEDICINE

## 2019-04-29 RX ORDER — GABAPENTIN 300 MG/1
300 CAPSULE ORAL 3 TIMES DAILY
Qty: 90 CAPSULE | Refills: 3 | Status: SHIPPED | OUTPATIENT
Start: 2019-04-29 | End: 2019-09-03 | Stop reason: SDUPTHER

## 2019-04-29 RX ORDER — GABAPENTIN 100 MG/1
CAPSULE ORAL
Qty: 90 CAPSULE | Refills: 1 | Status: SHIPPED | OUTPATIENT
Start: 2019-04-29 | End: 2019-04-29 | Stop reason: DRUGHIGH

## 2019-05-13 ENCOUNTER — OFFICE VISIT (OUTPATIENT)
Dept: PULMONOLOGY | Facility: MEDICAL CENTER | Age: 74
End: 2019-05-13
Payer: MEDICARE

## 2019-05-13 VITALS
DIASTOLIC BLOOD PRESSURE: 62 MMHG | SYSTOLIC BLOOD PRESSURE: 154 MMHG | HEART RATE: 111 BPM | OXYGEN SATURATION: 90 % | RESPIRATION RATE: 12 BRPM | WEIGHT: 188 LBS | TEMPERATURE: 98.3 F | HEIGHT: 77 IN | BODY MASS INDEX: 22.2 KG/M2

## 2019-05-13 DIAGNOSIS — B00.1 HERPES LABIALIS: ICD-10-CM

## 2019-05-13 DIAGNOSIS — R91.8 LUNG NODULES: ICD-10-CM

## 2019-05-13 DIAGNOSIS — Z99.81 CHRONIC RESPIRATORY FAILURE WITH HYPOXIA, ON HOME O2 THERAPY (HCC): ICD-10-CM

## 2019-05-13 DIAGNOSIS — E88.01 AAT (ALPHA-1-ANTITRYPSIN) DEFICIENCY (HCC): Primary | ICD-10-CM

## 2019-05-13 DIAGNOSIS — J96.11 CHRONIC RESPIRATORY FAILURE WITH HYPOXIA, ON HOME O2 THERAPY (HCC): ICD-10-CM

## 2019-05-13 DIAGNOSIS — J43.2 CENTRILOBULAR EMPHYSEMA (HCC): ICD-10-CM

## 2019-05-13 PROBLEM — B37.0 ORAL CANDIDIASIS: Status: RESOLVED | Noted: 2018-10-23 | Resolved: 2019-05-13

## 2019-05-13 PROBLEM — B37.83 CANDIDAL CHEILITIS: Status: RESOLVED | Noted: 2018-10-25 | Resolved: 2019-05-13

## 2019-05-13 PROCEDURE — 99214 OFFICE O/P EST MOD 30 MIN: CPT | Performed by: NURSE PRACTITIONER

## 2019-05-13 RX ORDER — FAMCICLOVIR 500 MG/1
TABLET, FILM COATED ORAL
Qty: 3 TABLET | Refills: 0 | Status: SHIPPED | OUTPATIENT
Start: 2019-05-13 | End: 2019-05-21

## 2019-05-21 ENCOUNTER — OFFICE VISIT (OUTPATIENT)
Dept: FAMILY MEDICINE CLINIC | Facility: CLINIC | Age: 74
End: 2019-05-21
Payer: MEDICARE

## 2019-05-21 ENCOUNTER — HOSPITAL ENCOUNTER (OUTPATIENT)
Dept: RADIOLOGY | Facility: HOSPITAL | Age: 74
Discharge: HOME | End: 2019-05-21
Attending: FAMILY MEDICINE
Payer: MEDICARE

## 2019-05-21 VITALS
OXYGEN SATURATION: 94 % | HEART RATE: 88 BPM | BODY MASS INDEX: 22.29 KG/M2 | SYSTOLIC BLOOD PRESSURE: 144 MMHG | DIASTOLIC BLOOD PRESSURE: 60 MMHG | TEMPERATURE: 97.4 F | RESPIRATION RATE: 20 BRPM | WEIGHT: 188 LBS

## 2019-05-21 DIAGNOSIS — M79.605 PAIN AND SWELLING OF LOWER EXTREMITY, LEFT: ICD-10-CM

## 2019-05-21 DIAGNOSIS — M79.89 PAIN AND SWELLING OF LOWER EXTREMITY, LEFT: Primary | ICD-10-CM

## 2019-05-21 DIAGNOSIS — M79.605 PAIN AND SWELLING OF LOWER EXTREMITY, LEFT: Primary | ICD-10-CM

## 2019-05-21 DIAGNOSIS — M79.89 PAIN AND SWELLING OF LOWER EXTREMITY, LEFT: ICD-10-CM

## 2019-05-21 DIAGNOSIS — J43.2 CENTRILOBULAR EMPHYSEMA (HCC): ICD-10-CM

## 2019-05-21 PROCEDURE — 93970 EXTREMITY STUDY: CPT

## 2019-05-21 PROCEDURE — 93970 EXTREMITY STUDY: CPT | Performed by: SURGERY

## 2019-05-21 PROCEDURE — 99213 OFFICE O/P EST LOW 20 MIN: CPT | Performed by: FAMILY MEDICINE

## 2019-05-22 ENCOUNTER — OFFICE VISIT (OUTPATIENT)
Dept: GASTROENTEROLOGY | Facility: CLINIC | Age: 74
End: 2019-05-22
Payer: MEDICARE

## 2019-05-22 VITALS
DIASTOLIC BLOOD PRESSURE: 62 MMHG | SYSTOLIC BLOOD PRESSURE: 138 MMHG | TEMPERATURE: 98 F | HEIGHT: 77 IN | WEIGHT: 191.8 LBS | HEART RATE: 90 BPM | BODY MASS INDEX: 22.65 KG/M2 | RESPIRATION RATE: 18 BRPM

## 2019-05-22 DIAGNOSIS — K21.9 GASTROESOPHAGEAL REFLUX DISEASE, ESOPHAGITIS PRESENCE NOT SPECIFIED: ICD-10-CM

## 2019-05-22 DIAGNOSIS — Z86.010 HISTORY OF COLON POLYPS: ICD-10-CM

## 2019-05-22 DIAGNOSIS — R19.7 DIARRHEA, UNSPECIFIED TYPE: Primary | ICD-10-CM

## 2019-05-22 PROCEDURE — 99214 OFFICE O/P EST MOD 30 MIN: CPT | Performed by: PHYSICIAN ASSISTANT

## 2019-05-22 RX ORDER — RANITIDINE 150 MG/1
150 CAPSULE ORAL EVERY EVENING
Qty: 30 CAPSULE | Refills: 5 | Status: SHIPPED | OUTPATIENT
Start: 2019-05-22 | End: 2019-07-03 | Stop reason: HOSPADM

## 2019-05-23 ENCOUNTER — APPOINTMENT (OUTPATIENT)
Dept: LAB | Facility: HOSPITAL | Age: 74
End: 2019-05-23
Payer: MEDICARE

## 2019-05-23 ENCOUNTER — TRANSCRIBE ORDERS (OUTPATIENT)
Dept: ADMINISTRATIVE | Facility: HOSPITAL | Age: 74
End: 2019-05-23

## 2019-05-23 DIAGNOSIS — R19.7 DIARRHEA, UNSPECIFIED TYPE: ICD-10-CM

## 2019-05-23 PROCEDURE — 87209 SMEAR COMPLEX STAIN: CPT

## 2019-05-23 PROCEDURE — 87505 NFCT AGENT DETECTION GI: CPT

## 2019-05-23 PROCEDURE — 87177 OVA AND PARASITES SMEARS: CPT

## 2019-05-23 PROCEDURE — 89055 LEUKOCYTE ASSESSMENT FECAL: CPT

## 2019-05-24 LAB
C DIFF TOX GENS STL QL NAA+PROBE: NORMAL
CAMPYLOBACTER DNA SPEC NAA+PROBE: NORMAL
SALMONELLA DNA SPEC QL NAA+PROBE: NORMAL
SHIGA TOXIN STX GENE SPEC NAA+PROBE: NORMAL
SHIGELLA DNA SPEC QL NAA+PROBE: NORMAL

## 2019-05-28 LAB
O+P STL CONC: NORMAL
WBC SPEC QL GRAM STN: NORMAL

## 2019-05-29 ENCOUNTER — TELEPHONE (OUTPATIENT)
Dept: FAMILY MEDICINE CLINIC | Facility: CLINIC | Age: 74
End: 2019-05-29

## 2019-05-29 ENCOUNTER — OFFICE VISIT (OUTPATIENT)
Dept: FAMILY MEDICINE CLINIC | Facility: CLINIC | Age: 74
End: 2019-05-29
Payer: MEDICARE

## 2019-05-29 VITALS
SYSTOLIC BLOOD PRESSURE: 152 MMHG | WEIGHT: 189 LBS | BODY MASS INDEX: 22.41 KG/M2 | OXYGEN SATURATION: 94 % | DIASTOLIC BLOOD PRESSURE: 60 MMHG | HEART RATE: 90 BPM | RESPIRATION RATE: 22 BRPM | TEMPERATURE: 98.6 F

## 2019-05-29 DIAGNOSIS — M79.605 PAIN AND SWELLING OF LOWER EXTREMITY, LEFT: Primary | ICD-10-CM

## 2019-05-29 DIAGNOSIS — M79.89 PAIN AND SWELLING OF LOWER EXTREMITY, LEFT: Primary | ICD-10-CM

## 2019-05-29 DIAGNOSIS — K59.1 FUNCTIONAL DIARRHEA: Primary | ICD-10-CM

## 2019-05-29 PROCEDURE — 99213 OFFICE O/P EST LOW 20 MIN: CPT | Performed by: FAMILY MEDICINE

## 2019-05-29 RX ORDER — CHOLESTYRAMINE 4 G/9G
1 POWDER, FOR SUSPENSION ORAL
Qty: 90 PACKET | Refills: 2 | Status: SHIPPED | OUTPATIENT
Start: 2019-05-29 | End: 2019-07-09 | Stop reason: SDDI

## 2019-05-29 RX ORDER — FUROSEMIDE 20 MG/1
20 TABLET ORAL DAILY
Qty: 30 TABLET | Refills: 5 | Status: SHIPPED | OUTPATIENT
Start: 2019-05-29 | End: 2019-06-17 | Stop reason: SDDI

## 2019-05-30 ENCOUNTER — OFFICE VISIT (OUTPATIENT)
Dept: HEMATOLOGY ONCOLOGY | Facility: MEDICAL CENTER | Age: 74
End: 2019-05-30
Payer: MEDICARE

## 2019-05-30 VITALS
SYSTOLIC BLOOD PRESSURE: 170 MMHG | DIASTOLIC BLOOD PRESSURE: 82 MMHG | TEMPERATURE: 98.4 F | HEART RATE: 93 BPM | BODY MASS INDEX: 22.91 KG/M2 | OXYGEN SATURATION: 91 % | RESPIRATION RATE: 18 BRPM | WEIGHT: 194 LBS | HEIGHT: 77 IN

## 2019-05-30 DIAGNOSIS — G62.9 NEUROPATHY: Primary | ICD-10-CM

## 2019-05-30 DIAGNOSIS — M79.605 PAIN AND SWELLING OF LEFT LOWER EXTREMITY: ICD-10-CM

## 2019-05-30 DIAGNOSIS — M79.89 PAIN AND SWELLING OF LEFT LOWER EXTREMITY: ICD-10-CM

## 2019-05-30 DIAGNOSIS — I27.82 OTHER CHRONIC PULMONARY EMBOLISM WITHOUT ACUTE COR PULMONALE (HCC): ICD-10-CM

## 2019-05-30 PROCEDURE — 99213 OFFICE O/P EST LOW 20 MIN: CPT | Performed by: INTERNAL MEDICINE

## 2019-06-10 ENCOUNTER — TELEPHONE (OUTPATIENT)
Dept: FAMILY MEDICINE CLINIC | Facility: CLINIC | Age: 74
End: 2019-06-10

## 2019-06-11 ENCOUNTER — TRANSCRIBE ORDERS (OUTPATIENT)
Dept: ADMINISTRATIVE | Facility: HOSPITAL | Age: 74
End: 2019-06-11

## 2019-06-11 ENCOUNTER — OFFICE VISIT (OUTPATIENT)
Dept: FAMILY MEDICINE CLINIC | Facility: CLINIC | Age: 74
End: 2019-06-11
Payer: MEDICARE

## 2019-06-11 ENCOUNTER — APPOINTMENT (OUTPATIENT)
Dept: LAB | Facility: HOSPITAL | Age: 74
End: 2019-06-11
Attending: FAMILY MEDICINE
Payer: MEDICARE

## 2019-06-11 VITALS
HEART RATE: 93 BPM | SYSTOLIC BLOOD PRESSURE: 118 MMHG | TEMPERATURE: 97.9 F | BODY MASS INDEX: 22.36 KG/M2 | OXYGEN SATURATION: 95 % | WEIGHT: 188.6 LBS | DIASTOLIC BLOOD PRESSURE: 60 MMHG

## 2019-06-11 DIAGNOSIS — I83.029 VENOUS ULCER OF LEFT LOWER EXTREMITY WITH VARICOSE VEINS (HCC): ICD-10-CM

## 2019-06-11 DIAGNOSIS — L97.929 VENOUS ULCER OF LEFT LOWER EXTREMITY WITH VARICOSE VEINS (HCC): ICD-10-CM

## 2019-06-11 DIAGNOSIS — E88.01 AAT (ALPHA-1-ANTITRYPSIN) DEFICIENCY (HCC): ICD-10-CM

## 2019-06-11 DIAGNOSIS — M79.605 PAIN AND SWELLING OF LOWER EXTREMITY, LEFT: ICD-10-CM

## 2019-06-11 DIAGNOSIS — I87.2 CHRONIC VENOUS INSUFFICIENCY: ICD-10-CM

## 2019-06-11 DIAGNOSIS — M79.89 PAIN AND SWELLING OF LOWER EXTREMITY, LEFT: ICD-10-CM

## 2019-06-11 DIAGNOSIS — M79.605 PAIN AND SWELLING OF LOWER EXTREMITY, LEFT: Primary | ICD-10-CM

## 2019-06-11 DIAGNOSIS — M79.89 PAIN AND SWELLING OF LOWER EXTREMITY, LEFT: Primary | ICD-10-CM

## 2019-06-11 LAB
ALBUMIN SERPL BCP-MCNC: 3.9 G/DL (ref 3.5–5)
ALP SERPL-CCNC: 79 U/L (ref 46–116)
ALT SERPL W P-5'-P-CCNC: 29 U/L (ref 12–78)
ANION GAP SERPL CALCULATED.3IONS-SCNC: 10 MMOL/L (ref 4–13)
AST SERPL W P-5'-P-CCNC: 16 U/L (ref 5–45)
BASOPHILS # BLD AUTO: 0.07 THOUSANDS/ΜL (ref 0–0.1)
BASOPHILS NFR BLD AUTO: 1 % (ref 0–1)
BILIRUB SERPL-MCNC: 1 MG/DL (ref 0.2–1)
BUN SERPL-MCNC: 12 MG/DL (ref 5–25)
CALCIUM SERPL-MCNC: 9.2 MG/DL (ref 8.3–10.1)
CHLORIDE SERPL-SCNC: 103 MMOL/L (ref 100–108)
CO2 SERPL-SCNC: 26 MMOL/L (ref 21–32)
CREAT SERPL-MCNC: 1.37 MG/DL (ref 0.6–1.3)
EOSINOPHIL # BLD AUTO: 0.32 THOUSAND/ΜL (ref 0–0.61)
EOSINOPHIL NFR BLD AUTO: 4 % (ref 0–6)
ERYTHROCYTE [DISTWIDTH] IN BLOOD BY AUTOMATED COUNT: 13.2 % (ref 11.6–15.1)
ERYTHROCYTE [SEDIMENTATION RATE] IN BLOOD: 14 MM/HOUR (ref 2–10)
GFR SERPL CREATININE-BSD FRML MDRD: 50 ML/MIN/1.73SQ M
GLUCOSE SERPL-MCNC: 99 MG/DL (ref 65–140)
HCT VFR BLD AUTO: 40.8 % (ref 36.5–49.3)
HGB BLD-MCNC: 13.8 G/DL (ref 12–17)
IMM GRANULOCYTES # BLD AUTO: 0.04 THOUSAND/UL (ref 0–0.2)
IMM GRANULOCYTES NFR BLD AUTO: 1 % (ref 0–2)
LYMPHOCYTES # BLD AUTO: 1.98 THOUSANDS/ΜL (ref 0.6–4.47)
LYMPHOCYTES NFR BLD AUTO: 25 % (ref 14–44)
MCH RBC QN AUTO: 32.2 PG (ref 26.8–34.3)
MCHC RBC AUTO-ENTMCNC: 33.8 G/DL (ref 31.4–37.4)
MCV RBC AUTO: 95 FL (ref 82–98)
MONOCYTES # BLD AUTO: 0.76 THOUSAND/ΜL (ref 0.17–1.22)
MONOCYTES NFR BLD AUTO: 10 % (ref 4–12)
NEUTROPHILS # BLD AUTO: 4.85 THOUSANDS/ΜL (ref 1.85–7.62)
NEUTS SEG NFR BLD AUTO: 59 % (ref 43–75)
NRBC BLD AUTO-RTO: 0 /100 WBCS
PLATELET # BLD AUTO: 215 THOUSANDS/UL (ref 149–390)
PMV BLD AUTO: 9.3 FL (ref 8.9–12.7)
POTASSIUM SERPL-SCNC: 4.2 MMOL/L (ref 3.5–5.3)
PROT SERPL-MCNC: 7.4 G/DL (ref 6.4–8.2)
RBC # BLD AUTO: 4.28 MILLION/UL (ref 3.88–5.62)
SODIUM SERPL-SCNC: 139 MMOL/L (ref 136–145)
WBC # BLD AUTO: 8.02 THOUSAND/UL (ref 4.31–10.16)

## 2019-06-11 PROCEDURE — 99213 OFFICE O/P EST LOW 20 MIN: CPT | Performed by: FAMILY MEDICINE

## 2019-06-11 PROCEDURE — 85025 COMPLETE CBC W/AUTO DIFF WBC: CPT

## 2019-06-11 PROCEDURE — 36415 COLL VENOUS BLD VENIPUNCTURE: CPT

## 2019-06-11 PROCEDURE — 85652 RBC SED RATE AUTOMATED: CPT

## 2019-06-11 PROCEDURE — 80053 COMPREHEN METABOLIC PANEL: CPT

## 2019-06-11 RX ORDER — CEPHALEXIN 500 MG/1
500 CAPSULE ORAL EVERY 8 HOURS SCHEDULED
Qty: 21 CAPSULE | Refills: 0 | Status: SHIPPED | OUTPATIENT
Start: 2019-06-11 | End: 2019-06-18

## 2019-06-12 ENCOUNTER — LAB REQUISITION (OUTPATIENT)
Dept: LAB | Facility: HOSPITAL | Age: 74
End: 2019-06-12
Payer: MEDICARE

## 2019-06-12 ENCOUNTER — APPOINTMENT (OUTPATIENT)
Dept: WOUND CARE | Facility: HOSPITAL | Age: 74
End: 2019-06-12
Payer: MEDICARE

## 2019-06-12 DIAGNOSIS — L97.901: ICD-10-CM

## 2019-06-12 DIAGNOSIS — M79.605 PAIN AND SWELLING OF LEFT LOWER EXTREMITY: Primary | ICD-10-CM

## 2019-06-12 DIAGNOSIS — I87.332 CHRONIC VENOUS HYPERTENSION (IDIOPATHIC) WITH ULCER AND INFLAMMATION OF LEFT LOWER EXTREMITY (CODE) (HCC): ICD-10-CM

## 2019-06-12 DIAGNOSIS — M79.89 PAIN AND SWELLING OF LEFT LOWER EXTREMITY: Primary | ICD-10-CM

## 2019-06-12 PROCEDURE — 99213 OFFICE O/P EST LOW 20 MIN: CPT

## 2019-06-12 PROCEDURE — 87147 CULTURE TYPE IMMUNOLOGIC: CPT | Performed by: FAMILY MEDICINE

## 2019-06-12 PROCEDURE — 87070 CULTURE OTHR SPECIMN AEROBIC: CPT | Performed by: FAMILY MEDICINE

## 2019-06-12 PROCEDURE — 97597 DBRDMT OPN WND 1ST 20 CM/<: CPT

## 2019-06-12 PROCEDURE — 87205 SMEAR GRAM STAIN: CPT | Performed by: FAMILY MEDICINE

## 2019-06-12 PROCEDURE — 87186 SC STD MICRODIL/AGAR DIL: CPT | Performed by: FAMILY MEDICINE

## 2019-06-14 ENCOUNTER — APPOINTMENT (OUTPATIENT)
Dept: WOUND CARE | Facility: HOSPITAL | Age: 74
End: 2019-06-14
Payer: MEDICARE

## 2019-06-14 ENCOUNTER — CONSULT (OUTPATIENT)
Dept: VASCULAR SURGERY | Facility: CLINIC | Age: 74
End: 2019-06-14
Payer: MEDICARE

## 2019-06-14 VITALS
HEIGHT: 77 IN | DIASTOLIC BLOOD PRESSURE: 58 MMHG | HEART RATE: 86 BPM | WEIGHT: 188 LBS | SYSTOLIC BLOOD PRESSURE: 122 MMHG | RESPIRATION RATE: 18 BRPM | BODY MASS INDEX: 22.2 KG/M2 | TEMPERATURE: 97.6 F

## 2019-06-14 DIAGNOSIS — I87.2 CHRONIC VENOUS INSUFFICIENCY: Primary | ICD-10-CM

## 2019-06-14 DIAGNOSIS — L97.929 VENOUS ULCER OF LEFT LOWER EXTREMITY WITH VARICOSE VEINS (HCC): ICD-10-CM

## 2019-06-14 DIAGNOSIS — I83.029 VENOUS ULCER OF LEFT LOWER EXTREMITY WITH VARICOSE VEINS (HCC): ICD-10-CM

## 2019-06-14 DIAGNOSIS — G62.9 NEUROPATHY: ICD-10-CM

## 2019-06-14 LAB
BACTERIA WND AEROBE CULT: ABNORMAL
GRAM STN SPEC: ABNORMAL
GRAM STN SPEC: ABNORMAL

## 2019-06-14 PROCEDURE — 99214 OFFICE O/P EST MOD 30 MIN: CPT | Performed by: NURSE PRACTITIONER

## 2019-06-14 PROCEDURE — 29581 APPL MULTLAYER CMPRN SYS LEG: CPT

## 2019-06-18 ENCOUNTER — OFFICE VISIT (OUTPATIENT)
Dept: FAMILY MEDICINE CLINIC | Facility: CLINIC | Age: 74
End: 2019-06-18
Payer: MEDICARE

## 2019-06-18 VITALS
TEMPERATURE: 97.6 F | SYSTOLIC BLOOD PRESSURE: 138 MMHG | DIASTOLIC BLOOD PRESSURE: 70 MMHG | BODY MASS INDEX: 22.41 KG/M2 | OXYGEN SATURATION: 95 % | WEIGHT: 189 LBS | HEART RATE: 87 BPM

## 2019-06-18 DIAGNOSIS — L97.929 VENOUS ULCER OF LEFT LOWER EXTREMITY WITH VARICOSE VEINS (HCC): ICD-10-CM

## 2019-06-18 DIAGNOSIS — J43.2 CENTRILOBULAR EMPHYSEMA (HCC): ICD-10-CM

## 2019-06-18 DIAGNOSIS — E88.01 AAT (ALPHA-1-ANTITRYPSIN) DEFICIENCY (HCC): ICD-10-CM

## 2019-06-18 DIAGNOSIS — I83.029 VENOUS ULCER OF LEFT LOWER EXTREMITY WITH VARICOSE VEINS (HCC): ICD-10-CM

## 2019-06-18 DIAGNOSIS — N17.9 AKI (ACUTE KIDNEY INJURY) (HCC): Primary | ICD-10-CM

## 2019-06-18 PROCEDURE — 99213 OFFICE O/P EST LOW 20 MIN: CPT | Performed by: FAMILY MEDICINE

## 2019-06-20 ENCOUNTER — APPOINTMENT (OUTPATIENT)
Dept: LAB | Facility: HOSPITAL | Age: 74
End: 2019-06-20
Attending: FAMILY MEDICINE
Payer: MEDICARE

## 2019-06-20 ENCOUNTER — APPOINTMENT (OUTPATIENT)
Dept: WOUND CARE | Facility: HOSPITAL | Age: 74
End: 2019-06-20
Payer: MEDICARE

## 2019-06-20 ENCOUNTER — TRANSCRIBE ORDERS (OUTPATIENT)
Dept: ADMINISTRATIVE | Facility: HOSPITAL | Age: 74
End: 2019-06-20

## 2019-06-20 DIAGNOSIS — N17.9 AKI (ACUTE KIDNEY INJURY) (HCC): ICD-10-CM

## 2019-06-20 LAB
ALBUMIN SERPL BCP-MCNC: 3.7 G/DL (ref 3.5–5)
ALP SERPL-CCNC: 70 U/L (ref 46–116)
ALT SERPL W P-5'-P-CCNC: 27 U/L (ref 12–78)
ANION GAP SERPL CALCULATED.3IONS-SCNC: 10 MMOL/L (ref 4–13)
AST SERPL W P-5'-P-CCNC: 20 U/L (ref 5–45)
BILIRUB SERPL-MCNC: 0.9 MG/DL (ref 0.2–1)
BUN SERPL-MCNC: 18 MG/DL (ref 5–25)
CALCIUM SERPL-MCNC: 8.9 MG/DL (ref 8.3–10.1)
CHLORIDE SERPL-SCNC: 106 MMOL/L (ref 100–108)
CO2 SERPL-SCNC: 28 MMOL/L (ref 21–32)
CREAT SERPL-MCNC: 1.28 MG/DL (ref 0.6–1.3)
GFR SERPL CREATININE-BSD FRML MDRD: 54 ML/MIN/1.73SQ M
GLUCOSE P FAST SERPL-MCNC: 104 MG/DL (ref 65–99)
POTASSIUM SERPL-SCNC: 3.8 MMOL/L (ref 3.5–5.3)
PROT SERPL-MCNC: 7.2 G/DL (ref 6.4–8.2)
SODIUM SERPL-SCNC: 144 MMOL/L (ref 136–145)

## 2019-06-20 PROCEDURE — 36415 COLL VENOUS BLD VENIPUNCTURE: CPT

## 2019-06-20 PROCEDURE — 11042 DBRDMT SUBQ TIS 1ST 20SQCM/<: CPT

## 2019-06-20 PROCEDURE — 80053 COMPREHEN METABOLIC PANEL: CPT

## 2019-06-24 ENCOUNTER — HOSPITAL ENCOUNTER (OUTPATIENT)
Dept: RADIOLOGY | Facility: HOSPITAL | Age: 74
Discharge: HOME | End: 2019-06-24
Attending: FAMILY MEDICINE
Payer: MEDICARE

## 2019-06-24 ENCOUNTER — HOSPITAL ENCOUNTER (OUTPATIENT)
Dept: RADIOLOGY | Facility: HOSPITAL | Age: 74
Discharge: HOME | End: 2019-06-24
Payer: MEDICARE

## 2019-06-24 DIAGNOSIS — I83.029 VENOUS ULCER OF LEFT LOWER EXTREMITY WITH VARICOSE VEINS (HCC): ICD-10-CM

## 2019-06-24 DIAGNOSIS — M79.89 PAIN AND SWELLING OF LEFT LOWER EXTREMITY: ICD-10-CM

## 2019-06-24 DIAGNOSIS — M79.605 PAIN AND SWELLING OF LEFT LOWER EXTREMITY: ICD-10-CM

## 2019-06-24 DIAGNOSIS — L97.929 VENOUS ULCER OF LEFT LOWER EXTREMITY WITH VARICOSE VEINS (HCC): ICD-10-CM

## 2019-06-24 DIAGNOSIS — L97.901: ICD-10-CM

## 2019-06-24 DIAGNOSIS — I87.2 CHRONIC VENOUS INSUFFICIENCY: ICD-10-CM

## 2019-06-24 PROCEDURE — 93923 UPR/LXTR ART STDY 3+ LVLS: CPT | Performed by: SURGERY

## 2019-06-24 PROCEDURE — 93971 EXTREMITY STUDY: CPT

## 2019-06-24 PROCEDURE — 93923 UPR/LXTR ART STDY 3+ LVLS: CPT

## 2019-06-24 PROCEDURE — 93971 EXTREMITY STUDY: CPT | Performed by: SURGERY

## 2019-06-26 ENCOUNTER — APPOINTMENT (OUTPATIENT)
Dept: LAB | Facility: HOSPITAL | Age: 74
End: 2019-06-26
Attending: FAMILY MEDICINE
Payer: MEDICARE

## 2019-06-26 ENCOUNTER — APPOINTMENT (OUTPATIENT)
Dept: WOUND CARE | Facility: HOSPITAL | Age: 74
End: 2019-06-26
Payer: MEDICARE

## 2019-06-26 ENCOUNTER — TRANSCRIBE ORDERS (OUTPATIENT)
Dept: ADMINISTRATIVE | Facility: HOSPITAL | Age: 74
End: 2019-06-26

## 2019-06-26 DIAGNOSIS — R73.09 ABNORMAL GLUCOSE: Primary | ICD-10-CM

## 2019-06-26 DIAGNOSIS — R73.09 ABNORMAL GLUCOSE: ICD-10-CM

## 2019-06-26 LAB
EST. AVERAGE GLUCOSE BLD GHB EST-MCNC: 108 MG/DL
HBA1C MFR BLD: 5.4 % (ref 4.2–6.3)

## 2019-06-26 PROCEDURE — 36415 COLL VENOUS BLD VENIPUNCTURE: CPT

## 2019-06-26 PROCEDURE — 97597 DBRDMT OPN WND 1ST 20 CM/<: CPT

## 2019-06-26 PROCEDURE — 83036 HEMOGLOBIN GLYCOSYLATED A1C: CPT

## 2019-06-28 ENCOUNTER — HOSPITAL ENCOUNTER (INPATIENT)
Facility: HOSPITAL | Age: 74
LOS: 5 days | Discharge: HOME HEALTH | DRG: 189 | End: 2019-07-03
Attending: EMERGENCY MEDICINE | Admitting: FAMILY MEDICINE
Payer: MEDICARE

## 2019-06-28 ENCOUNTER — APPOINTMENT (EMERGENCY)
Dept: RADIOLOGY | Facility: HOSPITAL | Age: 74
DRG: 189 | End: 2019-06-28
Payer: MEDICARE

## 2019-06-28 ENCOUNTER — OFFICE VISIT (OUTPATIENT)
Dept: VASCULAR SURGERY | Facility: CLINIC | Age: 74
End: 2019-06-28
Payer: MEDICARE

## 2019-06-28 VITALS
BODY MASS INDEX: 22.08 KG/M2 | RESPIRATION RATE: 20 BRPM | HEIGHT: 77 IN | WEIGHT: 187 LBS | DIASTOLIC BLOOD PRESSURE: 60 MMHG | TEMPERATURE: 99.2 F | HEART RATE: 98 BPM | SYSTOLIC BLOOD PRESSURE: 108 MMHG

## 2019-06-28 DIAGNOSIS — N17.9 ACUTE KIDNEY INJURY (HCC): ICD-10-CM

## 2019-06-28 DIAGNOSIS — J96.11 CHRONIC RESPIRATORY FAILURE WITH HYPOXIA (HCC): ICD-10-CM

## 2019-06-28 DIAGNOSIS — R06.02 SHORTNESS OF BREATH: ICD-10-CM

## 2019-06-28 DIAGNOSIS — I83.029 VENOUS ULCER OF LEFT LOWER EXTREMITY WITH VARICOSE VEINS (HCC): Primary | ICD-10-CM

## 2019-06-28 DIAGNOSIS — I83.029 VENOUS ULCER OF LEFT LOWER EXTREMITY WITH VARICOSE VEINS (HCC): ICD-10-CM

## 2019-06-28 DIAGNOSIS — I87.2 CHRONIC VENOUS INSUFFICIENCY: ICD-10-CM

## 2019-06-28 DIAGNOSIS — J44.1 COPD EXACERBATION (HCC): Primary | ICD-10-CM

## 2019-06-28 DIAGNOSIS — J96.21 ACUTE ON CHRONIC RESPIRATORY FAILURE WITH HYPOXIA (HCC): ICD-10-CM

## 2019-06-28 DIAGNOSIS — R53.1 WEAKNESS GENERALIZED: ICD-10-CM

## 2019-06-28 DIAGNOSIS — L97.929 VENOUS ULCER OF LEFT LOWER EXTREMITY WITH VARICOSE VEINS (HCC): ICD-10-CM

## 2019-06-28 DIAGNOSIS — L97.929 VENOUS ULCER OF LEFT LOWER EXTREMITY WITH VARICOSE VEINS (HCC): Primary | ICD-10-CM

## 2019-06-28 DIAGNOSIS — L98.9 SKIN LESION OF RIGHT LOWER EXTREMITY: ICD-10-CM

## 2019-06-28 PROBLEM — G47.00 INSOMNIA: Status: ACTIVE | Noted: 2019-06-28

## 2019-06-28 LAB
ALBUMIN SERPL BCP-MCNC: 3.9 G/DL (ref 3.5–5)
ALP SERPL-CCNC: 82 U/L (ref 46–116)
ALT SERPL W P-5'-P-CCNC: 33 U/L (ref 12–78)
ANION GAP SERPL CALCULATED.3IONS-SCNC: 14 MMOL/L (ref 4–13)
APTT PPP: 25 SECONDS (ref 23–37)
ARTERIAL PATENCY WRIST A: YES
AST SERPL W P-5'-P-CCNC: 23 U/L (ref 5–45)
BASE EXCESS BLDA CALC-SCNC: 0 MMOL/L
BASOPHILS # BLD AUTO: 0.07 THOUSANDS/ΜL (ref 0–0.1)
BASOPHILS NFR BLD AUTO: 1 % (ref 0–1)
BILIRUB SERPL-MCNC: 0.8 MG/DL (ref 0.2–1)
BODY TEMPERATURE: 99.4 DEGREES FEHRENHEIT
BUN SERPL-MCNC: 17 MG/DL (ref 5–25)
CALCIUM SERPL-MCNC: 9 MG/DL (ref 8.3–10.1)
CHLORIDE SERPL-SCNC: 103 MMOL/L (ref 100–108)
CO2 SERPL-SCNC: 23 MMOL/L (ref 21–32)
CREAT SERPL-MCNC: 1.97 MG/DL (ref 0.6–1.3)
EOSINOPHIL # BLD AUTO: 0.29 THOUSAND/ΜL (ref 0–0.61)
EOSINOPHIL NFR BLD AUTO: 4 % (ref 0–6)
ERYTHROCYTE [DISTWIDTH] IN BLOOD BY AUTOMATED COUNT: 13.2 % (ref 11.6–15.1)
GFR SERPL CREATININE-BSD FRML MDRD: 32 ML/MIN/1.73SQ M
GLUCOSE SERPL-MCNC: 118 MG/DL (ref 65–140)
HCO3 BLDA-SCNC: 21.3 MMOL/L (ref 22–28)
HCT VFR BLD AUTO: 39.8 % (ref 36.5–49.3)
HGB BLD-MCNC: 13.9 G/DL (ref 12–17)
IMM GRANULOCYTES # BLD AUTO: 0.04 THOUSAND/UL (ref 0–0.2)
IMM GRANULOCYTES NFR BLD AUTO: 1 % (ref 0–2)
INR PPP: 1.01 (ref 0.86–1.16)
IPAP: 10
LYMPHOCYTES # BLD AUTO: 2.03 THOUSANDS/ΜL (ref 0.6–4.47)
LYMPHOCYTES NFR BLD AUTO: 24 % (ref 14–44)
MCH RBC QN AUTO: 33 PG (ref 26.8–34.3)
MCHC RBC AUTO-ENTMCNC: 34.9 G/DL (ref 31.4–37.4)
MCV RBC AUTO: 95 FL (ref 82–98)
MONOCYTES # BLD AUTO: 0.67 THOUSAND/ΜL (ref 0.17–1.22)
MONOCYTES NFR BLD AUTO: 8 % (ref 4–12)
NEUTROPHILS # BLD AUTO: 5.26 THOUSANDS/ΜL (ref 1.85–7.62)
NEUTS SEG NFR BLD AUTO: 62 % (ref 43–75)
NON VENT- BIPAP: ABNORMAL
NRBC BLD AUTO-RTO: 0 /100 WBCS
NT-PROBNP SERPL-MCNC: 182 PG/ML
O2 CT BLDA-SCNC: 19.6 ML/DL (ref 16–23)
OXYHGB MFR BLDA: 97.1 % (ref 94–97)
PCO2 BLDA: 26.5 MM HG (ref 36–44)
PCO2 TEMP ADJ BLDA: 27 MM HG (ref 36–44)
PEEP MAX SETTING VENT: 5 CM[H2O]
PH BLD: 7.52 [PH] (ref 7.35–7.45)
PH BLDA: 7.52 [PH] (ref 7.35–7.45)
PLATELET # BLD AUTO: 206 THOUSANDS/UL (ref 149–390)
PMV BLD AUTO: 9 FL (ref 8.9–12.7)
PO2 BLD: 101.2 MM HG (ref 75–129)
PO2 BLDA: 98.8 MM HG (ref 75–129)
POTASSIUM SERPL-SCNC: 3.9 MMOL/L (ref 3.5–5.3)
PROT SERPL-MCNC: 7.3 G/DL (ref 6.4–8.2)
PROTHROMBIN TIME: 10.6 SECONDS (ref 9.4–11.7)
RBC # BLD AUTO: 4.21 MILLION/UL (ref 3.88–5.62)
SODIUM SERPL-SCNC: 140 MMOL/L (ref 136–145)
SPECIMEN SOURCE: ABNORMAL
TROPONIN I SERPL-MCNC: <0.02 NG/ML
VENT BIPAP FIO2: 28 %
WBC # BLD AUTO: 8.36 THOUSAND/UL (ref 4.31–10.16)

## 2019-06-28 PROCEDURE — 87081 CULTURE SCREEN ONLY: CPT | Performed by: FAMILY MEDICINE

## 2019-06-28 PROCEDURE — 96374 THER/PROPH/DIAG INJ IV PUSH: CPT

## 2019-06-28 PROCEDURE — 85730 THROMBOPLASTIN TIME PARTIAL: CPT | Performed by: EMERGENCY MEDICINE

## 2019-06-28 PROCEDURE — 84484 ASSAY OF TROPONIN QUANT: CPT | Performed by: EMERGENCY MEDICINE

## 2019-06-28 PROCEDURE — 83880 ASSAY OF NATRIURETIC PEPTIDE: CPT | Performed by: EMERGENCY MEDICINE

## 2019-06-28 PROCEDURE — 94660 CPAP INITIATION&MGMT: CPT

## 2019-06-28 PROCEDURE — 85025 COMPLETE CBC W/AUTO DIFF WBC: CPT | Performed by: EMERGENCY MEDICINE

## 2019-06-28 PROCEDURE — 85610 PROTHROMBIN TIME: CPT | Performed by: EMERGENCY MEDICINE

## 2019-06-28 PROCEDURE — NC001 PR NO CHARGE: Performed by: FAMILY MEDICINE

## 2019-06-28 PROCEDURE — 94640 AIRWAY INHALATION TREATMENT: CPT

## 2019-06-28 PROCEDURE — 99212 OFFICE O/P EST SF 10 MIN: CPT | Performed by: SURGERY

## 2019-06-28 PROCEDURE — 1124F ACP DISCUSS-NO DSCNMKR DOCD: CPT | Performed by: PHYSICIAN ASSISTANT

## 2019-06-28 PROCEDURE — 36415 COLL VENOUS BLD VENIPUNCTURE: CPT | Performed by: EMERGENCY MEDICINE

## 2019-06-28 PROCEDURE — 99285 EMERGENCY DEPT VISIT HI MDM: CPT

## 2019-06-28 PROCEDURE — 71045 X-RAY EXAM CHEST 1 VIEW: CPT

## 2019-06-28 PROCEDURE — 93005 ELECTROCARDIOGRAM TRACING: CPT

## 2019-06-28 PROCEDURE — 82805 BLOOD GASES W/O2 SATURATION: CPT | Performed by: EMERGENCY MEDICINE

## 2019-06-28 PROCEDURE — 36600 WITHDRAWAL OF ARTERIAL BLOOD: CPT

## 2019-06-28 PROCEDURE — 94760 N-INVAS EAR/PLS OXIMETRY 1: CPT

## 2019-06-28 PROCEDURE — 80053 COMPREHEN METABOLIC PANEL: CPT | Performed by: EMERGENCY MEDICINE

## 2019-06-28 RX ORDER — METHYLPREDNISOLONE SODIUM SUCCINATE 125 MG/2ML
125 INJECTION, POWDER, LYOPHILIZED, FOR SOLUTION INTRAMUSCULAR; INTRAVENOUS ONCE
Status: COMPLETED | OUTPATIENT
Start: 2019-06-28 | End: 2019-06-28

## 2019-06-28 RX ORDER — SODIUM CHLORIDE 9 MG/ML
75 INJECTION, SOLUTION INTRAVENOUS CONTINUOUS
Status: DISCONTINUED | OUTPATIENT
Start: 2019-06-28 | End: 2019-06-30

## 2019-06-28 RX ORDER — RANITIDINE 150 MG/1
TABLET ORAL
COMMUNITY
Start: 2019-06-18 | End: 2019-07-09 | Stop reason: SDUPTHER

## 2019-06-28 RX ORDER — FUROSEMIDE 20 MG/1
TABLET ORAL
COMMUNITY
Start: 2019-06-24 | End: 2019-07-03 | Stop reason: HOSPADM

## 2019-06-28 RX ORDER — HEPARIN SODIUM 5000 [USP'U]/ML
5000 INJECTION, SOLUTION INTRAVENOUS; SUBCUTANEOUS EVERY 8 HOURS SCHEDULED
Status: DISCONTINUED | OUTPATIENT
Start: 2019-06-28 | End: 2019-06-29

## 2019-06-28 RX ORDER — AMLODIPINE BESYLATE 10 MG/1
10 TABLET ORAL DAILY
Status: DISCONTINUED | OUTPATIENT
Start: 2019-06-29 | End: 2019-07-03 | Stop reason: HOSPADM

## 2019-06-28 RX ORDER — ZOLPIDEM TARTRATE 5 MG/1
10 TABLET ORAL
Status: DISCONTINUED | OUTPATIENT
Start: 2019-06-28 | End: 2019-07-03 | Stop reason: HOSPADM

## 2019-06-28 RX ORDER — METHYLPREDNISOLONE SODIUM SUCCINATE 125 MG/2ML
60 INJECTION, POWDER, LYOPHILIZED, FOR SOLUTION INTRAMUSCULAR; INTRAVENOUS EVERY 8 HOURS SCHEDULED
Status: DISCONTINUED | OUTPATIENT
Start: 2019-06-29 | End: 2019-06-29

## 2019-06-28 RX ORDER — METHYLPREDNISOLONE SODIUM SUCCINATE 125 MG/2ML
60 INJECTION, POWDER, LYOPHILIZED, FOR SOLUTION INTRAMUSCULAR; INTRAVENOUS EVERY 8 HOURS SCHEDULED
Status: DISCONTINUED | OUTPATIENT
Start: 2019-06-28 | End: 2019-06-28

## 2019-06-28 RX ORDER — TAMSULOSIN HYDROCHLORIDE 0.4 MG/1
0.4 CAPSULE ORAL DAILY
Status: DISCONTINUED | OUTPATIENT
Start: 2019-06-29 | End: 2019-07-03 | Stop reason: HOSPADM

## 2019-06-28 RX ORDER — METHYLPREDNISOLONE SODIUM SUCCINATE 125 MG/2ML
125 INJECTION, POWDER, LYOPHILIZED, FOR SOLUTION INTRAMUSCULAR; INTRAVENOUS ONCE
Status: DISCONTINUED | OUTPATIENT
Start: 2019-06-28 | End: 2019-06-28

## 2019-06-28 RX ORDER — CEFAZOLIN SODIUM 2 G/50ML
2000 SOLUTION INTRAVENOUS ONCE
Status: CANCELLED | OUTPATIENT
Start: 2019-06-28 | End: 2019-06-28

## 2019-06-28 RX ORDER — IPRATROPIUM BROMIDE AND ALBUTEROL SULFATE 2.5; .5 MG/3ML; MG/3ML
3 SOLUTION RESPIRATORY (INHALATION) ONCE
Status: COMPLETED | OUTPATIENT
Start: 2019-06-28 | End: 2019-06-28

## 2019-06-28 RX ORDER — GABAPENTIN 300 MG/1
300 CAPSULE ORAL 3 TIMES DAILY
Status: DISCONTINUED | OUTPATIENT
Start: 2019-06-28 | End: 2019-07-03 | Stop reason: HOSPADM

## 2019-06-28 RX ORDER — IPRATROPIUM BROMIDE AND ALBUTEROL SULFATE 2.5; .5 MG/3ML; MG/3ML
3 SOLUTION RESPIRATORY (INHALATION)
Status: DISCONTINUED | OUTPATIENT
Start: 2019-06-29 | End: 2019-07-03 | Stop reason: HOSPADM

## 2019-06-28 RX ORDER — IPRATROPIUM BROMIDE AND ALBUTEROL SULFATE 2.5; .5 MG/3ML; MG/3ML
3 SOLUTION RESPIRATORY (INHALATION) EVERY 2 HOUR PRN
Status: DISCONTINUED | OUTPATIENT
Start: 2019-06-28 | End: 2019-07-03 | Stop reason: HOSPADM

## 2019-06-28 RX ORDER — SUCRALFATE ORAL 1 G/10ML
500 SUSPENSION ORAL
Status: DISCONTINUED | OUTPATIENT
Start: 2019-06-29 | End: 2019-07-03 | Stop reason: HOSPADM

## 2019-06-28 RX ORDER — PANTOPRAZOLE SODIUM 40 MG/1
40 TABLET, DELAYED RELEASE ORAL 2 TIMES DAILY
Status: DISCONTINUED | OUTPATIENT
Start: 2019-06-29 | End: 2019-07-03 | Stop reason: HOSPADM

## 2019-06-28 RX ORDER — FINASTERIDE 5 MG/1
5 TABLET, FILM COATED ORAL EVERY MORNING
Status: DISCONTINUED | OUTPATIENT
Start: 2019-06-29 | End: 2019-07-03 | Stop reason: HOSPADM

## 2019-06-28 RX ADMIN — IPRATROPIUM BROMIDE AND ALBUTEROL SULFATE 3 ML: 2.5; .5 SOLUTION RESPIRATORY (INHALATION) at 17:56

## 2019-06-28 RX ADMIN — METHYLPREDNISOLONE SODIUM SUCCINATE 125 MG: 125 INJECTION, POWDER, FOR SOLUTION INTRAMUSCULAR; INTRAVENOUS at 17:48

## 2019-06-28 RX ADMIN — GABAPENTIN 300 MG: 300 CAPSULE ORAL at 23:08

## 2019-06-28 RX ADMIN — HEPARIN SODIUM 5000 UNITS: 5000 INJECTION INTRAVENOUS; SUBCUTANEOUS at 23:08

## 2019-06-28 RX ADMIN — ZOLPIDEM TARTRATE 10 MG: 5 TABLET, COATED ORAL at 23:08

## 2019-06-28 RX ADMIN — SODIUM CHLORIDE 75 ML/HR: 0.9 INJECTION, SOLUTION INTRAVENOUS at 22:59

## 2019-06-28 NOTE — ED NOTES
Pt taken off BiPap as per Dr Bonny Farah and placed on 2L of O2 via nasal canula     Donald Bridges RN  06/28/19 1935

## 2019-06-28 NOTE — ED PROVIDER NOTES
History  Chief Complaint   Patient presents with    Shortness of Breath     hx COPD, [id number], increasing for past hour     Patient states he is oxygen-dependent COPD and has been having worsening trouble breathing for at least several days perhaps more than a week  He states he was seen by his vascular doctor early today, but states he was not so bad then  He presents the emergency room acutely short of breath, with rapid respiratory rate  He states his cough is not change, does not have a fever chills  He denies any recent sick contacts or cold symptoms  He denies any pain in the chest   There is no pain with deep breathing  Patient is simply feels like he can't catch his breath  He used a nebulizer prior to arrival without relief  States his last use of steroids was several week ago  Prior to Admission Medications   Prescriptions Last Dose Informant Patient Reported? Taking? CARAFATE 1 GM/10ML suspension  Self No No   Sig: TAKE 10 ML (1G TOTAL) BY MOUTH 4 (FOUR) TIMES A DAY   OXYGEN-HELIUM IN  Self Yes No   Sig: Inhale 2L at rest- 3L with activity   ZEMAIRA injection  Self Yes No   Sig: Once a week-    albuterol (2 5 mg/3 mL) 0 083 % nebulizer solution  Self Yes No   Sig: Inhale 1 each every 4 (four) hours as needed   albuterol (PROVENTIL HFA,VENTOLIN HFA) 90 mcg/act inhaler  Self No No   Sig: Inhale 2 puffs 4 (four) times a day   amLODIPine (NORVASC) 10 mg tablet  Self No No   Sig: TAKE ONE TABLET BY MOUTH ONE TIME DAILY    cholestyramine (QUESTRAN) 4 g packet  Self No No   Sig: Take 1 packet (4 g total) by mouth 3 (three) times a day with meals   finasteride (PROSCAR) 5 mg tablet  Self Yes No   Sig: Take 5 mg by mouth every morning     fluticasone (FLONASE) 50 mcg/act nasal spray  Self No No   Si sprays into each nostril daily   fluticasone-umeclidinium-vilanterol (TRELEGY ELLIPTA) 100-62 5-25 MCG/INH inhaler   No No   Sig: Inhale 1 puff daily for 30 days Rinse mouth after use  furosemide (LASIX) 20 mg tablet  Self Yes No   gabapentin (NEURONTIN) 300 mg capsule  Self No No   Sig: Take 1 capsule (300 mg total) by mouth 3 (three) times a day   ipratropium-albuterol (DUO-NEB) 0 5-2 5 mg/3 mL nebulizer solution  Self No No   Sig: Take 1 vial (3 mL total) by nebulization 4 (four) times a day   Patient not taking: Reported on 6/28/2019   ipratropium-albuterol (DUO-NEB) 0 5-2 5 mg/3 mL nebulizer solution  Self No No   Sig: Take 1 vial (3 mL total) by nebulization 3 (three) times a day   nystatin (MYCOSTATIN) cream  Self No No   Sig: Apply topically 2 (two) times a day   pantoprazole (PROTONIX) 40 mg tablet  Self No No   Sig: TAKE ONE TABLET BY MOUTH TWICE DAILY    ranitidine (ZANTAC) 150 MG capsule  Self No No   Sig: Take 1 capsule (150 mg total) by mouth every evening   ranitidine (ZANTAC) 150 mg tablet  Self Yes No   tamsulosin (FLOMAX) 0 4 mg  Self No No   Sig: TAKE ONE CAPSULE BY MOUTH ONE TIME DAILY    zolpidem (AMBIEN) 10 mg tablet  Self No No   Sig: TAKE ONE TABLET BY MOUTH NIGHTLY AT BEDTIME       Facility-Administered Medications: None       Past Medical History:   Diagnosis Date    Anesthesia complication     Difficult to wake up    Arthritis     BPH (benign prostatic hyperplasia)     urinary frequency    Cancer (HCC)     basal cell neck, face    COPD (chronic obstructive pulmonary disease) (HCC)     Full dentures     Hypertension     controlled    Irritable bowel syndrome     Kidney stone     at least 7 episodes    Liver disease     Alpha 1- enzyme deficiency - diagnosed 2002   has been on weekly replacement therapy since then    Pulmonary emphysema ([facility] [address] )     1/25/15  FEV1 - 2 45 liters or 59% of predicted    RSV infection 12/2017    Wears glasses     for driving only       Past Surgical History:   Procedure Laterality Date    BACK SURGERY  2008    discectomy    COLONOSCOPY      COLONOSCOPY N/A 3/10/2017    Procedure: Rm Linares;  Surgeon: Verito Barrera MD; Location: [facility] GI LAB; Service:    Maria Del Rosario Caldera      removal of kidney stones    ESOPHAGOGASTRODUODENOSCOPY N/A 3/10/2017    Procedure: ESOPHAGOGASTRODUODENOSCOPY (EGD); Surgeon: Brennan Rivas MD;  Location: [facility] GI LAB; Service:     LITHOTRIPSY      [place] ESOPHAGOGASTRODUODENOSCOPY TRANSORAL DIAGNOSTIC N/A 2018    Procedure: ESOPHAGOGASTRODUODENOSCOPY (EGD); Surgeon: Brennan Rivas MD;  Location: [facility] GI LAB; Service: Gastroenterology    TONSILLECTOMY      VEIN LIGATION AND STRIPPING Bilateral     18's       Family History   Problem Relation Age of Onset    Emphysema Mother         never smoked    Emphysema Father     Cancer Brother         colon    Colon cancer Brother     Ulcerative colitis Family     Liver disease Family      I have reviewed and agree with the history as documented  Social History     Tobacco Use    Smoking status: Former Smoker     Packs/day: 1 00     Years: 60 00     Pack years: 60 00     Last attempt to quit: 2017     Years since quittin 8    Smokeless tobacco: Never Used    Tobacco comment: quit in 2017   Substance Use Topics    Alcohol use: No    Drug use: No        Review of Systems   Constitutional: Negative for chills and fever  HENT: Negative for congestion and sore throat  Eyes: Negative for visual disturbance  Respiratory: Positive for cough and shortness of breath  Negative for wheezing  Cardiovascular: Positive for leg swelling  Negative for chest pain  Gastrointestinal: Negative for abdominal pain  Genitourinary: Negative for dysuria  Musculoskeletal: Negative for arthralgias and back pain  Skin: Negative for rash  Neurological: Negative for syncope and headaches  Hematological: Does not bruise/bleed easily  Psychiatric/Behavioral: Negative for confusion  All other systems reviewed and are negative  Physical Exam  Physical Exam   Constitutional: He is oriented to person, place, and time   He appears well-developed and well-nourished  HENT:   Head: Normocephalic and atraumatic  Mouth/Throat: Oropharynx is clear and moist    Eyes: EOM are normal    Neck: Normal range of motion  Neck supple  Cardiovascular: Normal rate, normal heart sounds and intact distal pulses  Pulmonary/Chest: Breath sounds normal  No stridor  Tachypnea noted  He is in respiratory distress  He has no wheezes  He has no rhonchi  He has no rales  Abdominal: Soft  Bowel sounds are normal    Musculoskeletal: Normal range of motion  Right lower leg: He exhibits edema  Left lower leg: He exhibits edema  Neurological: He is alert and oriented to person, place, and time  Skin: Skin is warm and dry  Capillary refill takes less than 2 seconds  Psychiatric: He has a normal mood and affect  His behavior is normal    Nursing note and vitals reviewed        Vital Signs  ED Triage Vitals   Temperature Pulse Respirations Blood Pressure SpO2   06/28/19 1735 06/28/19 1724 06/28/19 1724 06/28/19 1724 06/28/19 1724   99 4 °F (37 4 °C) 93 (!) 32 (!) 173/76 95 %      Temp Source Heart Rate Source Patient Position - Orthostatic VS BP Location FiO2 (%)   06/28/19 1735 06/28/19 1724 06/28/19 1724 06/28/19 1724 --   Tympanic Monitor Lying Right arm       Pain Score       06/28/19 1724       No Pain           Vitals:    06/28/19 1830 06/28/19 1930 06/28/19 2030 06/28/19 2100   BP: 152/84 (!) 174/78 148/69 158/96   Pulse: 86 80 76 82   Patient Position - Orthostatic VS: Sitting Sitting Sitting          Visual Acuity      ED Medications  Medications   ipratropium-albuterol (DUO-NEB) 0 5-2 5 mg/3 mL inhalation solution 3 mL (3 mL Nebulization Given 6/28/19 1756)   methylPREDNISolone sodium succinate (Solu-MEDROL) injection 125 mg (125 mg Intravenous Given 6/28/19 1748)       Diagnostic Studies  Results Reviewed     Procedure Component Value Units Date/Time    Blood gas, arterial [[id number]]  (Abnormal) Collected:  06/28/19 1753    Lab Status: Final result Specimen:  Blood, Arterial from Radial, Right Updated:  06/28/19 1801     pH, Arterial 7 524     PH ART TC 7 518     pCO2, Arterial 26 5 mm Hg      PCO2 (TC) Arterial 27 0 mm Hg      pO2, Arterial 98 8 mm Hg      PO2 (TC) Arterial 101 2 mm Hg      HCO3, Arterial 21 3 mmol/L      Base Excess, Arterial 0 0 mmol/L      O2 Content, Arterial 19 6 mL/dL      O2 HGB,Arterial  97 1 %      SOURCE Radial, Right     [place] TEST Yes     Temperature 99 4 Degrees Fehrenheit      Non Vent type BIPAP BIPAP     IPAP 10     EPAP 5     BIPAP fio2 28 %     Comprehensive metabolic panel [[id number]]  (Abnormal) Collected:  06/28/19 1732    Lab Status:  Final result Specimen:  Blood from Arm, Left Updated:  06/28/19 1800     Sodium 140 mmol/L      Potassium 3 9 mmol/L      Chloride 103 mmol/L      CO2 23 mmol/L      ANION GAP 14 mmol/L      BUN 17 mg/dL      Creatinine 1 97 mg/dL      Glucose 118 mg/dL      Calcium 9 0 mg/dL      AST 23 U/L      ALT 33 U/L      Alkaline Phosphatase 82 U/L      Total Protein 7 3 g/dL      Albumin 3 9 g/dL      Total Bilirubin 0 80 mg/dL      eGFR 32 ml/min/1 73sq m     Narrative:       [place] guidelines for Chronic Kidney Disease (CKD):     Stage 1 with normal or high GFR (GFR > 90 mL/min/1 73 square meters)    Stage 2 Mild CKD (GFR = 60-89 mL/min/1 73 square meters)    Stage 3A Moderate CKD (GFR = 45-59 mL/min/1 73 square meters)    Stage 3B Moderate CKD (GFR = 30-44 mL/min/1 73 square meters)    Stage 4 Severe CKD (GFR = 15-29 mL/min/1 73 square meters)    Stage 5 End Stage CKD (GFR <15 mL/min/1 73 square meters)  Note: GFR calculation is accurate only with a steady state creatinine    B-type natriuretic peptide [[id number]]  (Normal) Collected:  06/28/19 1732    Lab Status:  Final result Specimen:  Blood from Arm, Left Updated:  06/28/19 1800     NT-proBNP 182 pg/mL     Troponin I [[id number]]  (Normal) Collected:  06/28/19 1732    Lab Status:  Final result Specimen:  Blood from Arm, Left Updated:  06/28/19 1758     Troponin I <0 02 ng/mL     Protime-INR [[id number]]  (Normal) Collected:  06/28/19 1732    Lab Status:  Final result Specimen:  Blood from Arm, Left Updated:  06/28/19 1753     Protime 10 6 seconds      INR 1 01    APTT [[id number]]  (Normal) Collected:  06/28/19 1732    Lab Status:  Final result Specimen:  Blood from Arm, Left Updated:  06/28/19 1753     PTT 25 seconds     CBC and differential [[id number]] Collected:  06/28/19 1732    Lab Status:  Final result Specimen:  Blood from Arm, Left Updated:  06/28/19 1736     WBC 8 36 Thousand/uL      RBC 4 21 Million/uL      Hemoglobin 13 9 g/dL      Hematocrit 39 8 %      MCV 95 fL      MCH 33 0 pg      MCHC 34 9 g/dL      RDW 13 2 %      MPV 9 0 fL      Platelets 752 Thousands/uL      nRBC 0 /100 WBCs      Neutrophils Relative 62 %      Immat GRANS % 1 %      Lymphocytes Relative 24 %      Monocytes Relative 8 %      Eosinophils Relative 4 %      Basophils Relative 1 %      Neutrophils Absolute 5 26 Thousands/µL      Immature Grans Absolute 0 04 Thousand/uL      Lymphocytes Absolute 2 03 Thousands/µL      Monocytes Absolute 0 67 Thousand/µL      Eosinophils Absolute 0 29 Thousand/µL      Basophils Absolute 0 07 Thousands/µL                  XR chest 1 view portable    (Results Pending)              Procedures  ECG 12 Lead Documentation Only  Date/Time: 6/28/2019 5:28 PM  Performed by: August Gore MD  Authorized by: August Gore MD     Indications / Diagnosis:  Short of breath  ECG reviewed by me, the ED Provider: yes    Patient location:  ED  Interpretation:     Interpretation: abnormal    Rate:     ECG rate:  91    ECG rate assessment: normal    Rhythm:     Rhythm: sinus rhythm    Ectopy:     Ectopy: none    QRS:     QRS axis:  Normal    QRS intervals:  Normal  Conduction:     Conduction: normal    ST segments:     ST segments:  Normal  T waves:     T waves: normal    Q waves:     Q waves:  III           ED Course                               MDM  Number of Diagnoses or Management Options  Diagnosis management comments: Patient is tachypneic but not hypoxic  I suspect COPD however will evaluate for possible pneumonia and/or sepsis, cardiac etiology  Will place on BiPAP check ABG and provide steroids      Disposition  Final diagnoses:   COPD exacerbation ([facility] [address] )   Acute kidney injury ([facility] [address] )     Time reflects when diagnosis was documented in both MDM as applicable and the Disposition within this note     Time User Action Codes Description Comment    6/28/2019 10:07 PM Dung FRENCH Add [J44 1] COPD exacerbation ([facility] [address] )     6/28/2019 10:08 PM Dung FRENCH Add [N17 9] Acute kidney injury [facility])       ED Disposition     ED Disposition Condition Date/Time Comment    Admit Stable Fri Jun 28, 2019 10:07 PM Case was discussed with Scooter Tavarez and the patient's admission status was agreed to be Admission Status: inpatient status to the service of Dr Scooter Tavarez   Follow-up Information    None         Patient's Medications   Discharge Prescriptions    No medications on file     No discharge procedures on file      ED Provider  Electronically Signed by           Carina Antunez MD  06/28/19 1579

## 2019-06-28 NOTE — RESPIRATORY THERAPY NOTE
Weaning patient off BiPAP  Stable with SpO2 of 96% after 10 minutes on 2LNC  Breath sounds clear  No distress noted

## 2019-06-29 LAB
ANION GAP SERPL CALCULATED.3IONS-SCNC: 14 MMOL/L (ref 4–13)
BILIRUB UR QL STRIP: NEGATIVE
BUN SERPL-MCNC: 20 MG/DL (ref 5–25)
CALCIUM SERPL-MCNC: 8.9 MG/DL (ref 8.3–10.1)
CHLORIDE SERPL-SCNC: 103 MMOL/L (ref 100–108)
CLARITY UR: CLEAR
CO2 SERPL-SCNC: 21 MMOL/L (ref 21–32)
COLOR UR: YELLOW
CREAT SERPL-MCNC: 1.61 MG/DL (ref 0.6–1.3)
DEPRECATED D DIMER PPP: 950 NG/ML (FEU) (ref 190–520)
ERYTHROCYTE [DISTWIDTH] IN BLOOD BY AUTOMATED COUNT: 13 % (ref 11.6–15.1)
GFR SERPL CREATININE-BSD FRML MDRD: 41 ML/MIN/1.73SQ M
GLUCOSE SERPL-MCNC: 188 MG/DL (ref 65–140)
GLUCOSE UR STRIP-MCNC: ABNORMAL MG/DL
HCT VFR BLD AUTO: 39 % (ref 36.5–49.3)
HGB BLD-MCNC: 13.3 G/DL (ref 12–17)
HGB UR QL STRIP.AUTO: NEGATIVE
KETONES UR STRIP-MCNC: ABNORMAL MG/DL
LEUKOCYTE ESTERASE UR QL STRIP: NEGATIVE
MAGNESIUM SERPL-MCNC: 1.9 MG/DL (ref 1.6–2.6)
MCH RBC QN AUTO: 32.5 PG (ref 26.8–34.3)
MCHC RBC AUTO-ENTMCNC: 34.1 G/DL (ref 31.4–37.4)
MCV RBC AUTO: 95 FL (ref 82–98)
NITRITE UR QL STRIP: NEGATIVE
PH UR STRIP.AUTO: 5.5 [PH]
PHOSPHATE SERPL-MCNC: 2 MG/DL (ref 2.3–4.1)
PLATELET # BLD AUTO: 186 THOUSANDS/UL (ref 149–390)
PMV BLD AUTO: 9.2 FL (ref 8.9–12.7)
POTASSIUM SERPL-SCNC: 4 MMOL/L (ref 3.5–5.3)
PROCALCITONIN SERPL-MCNC: <0.05 NG/ML
PROT UR STRIP-MCNC: NEGATIVE MG/DL
RBC # BLD AUTO: 4.09 MILLION/UL (ref 3.88–5.62)
SODIUM SERPL-SCNC: 138 MMOL/L (ref 136–145)
SP GR UR STRIP.AUTO: >=1.03 (ref 1–1.03)
UROBILINOGEN UR QL STRIP.AUTO: 0.2 E.U./DL
WBC # BLD AUTO: 7.65 THOUSAND/UL (ref 4.31–10.16)

## 2019-06-29 PROCEDURE — 99232 SBSQ HOSP IP/OBS MODERATE 35: CPT | Performed by: FAMILY MEDICINE

## 2019-06-29 PROCEDURE — G8979 MOBILITY GOAL STATUS: HCPCS

## 2019-06-29 PROCEDURE — 84100 ASSAY OF PHOSPHORUS: CPT | Performed by: STUDENT IN AN ORGANIZED HEALTH CARE EDUCATION/TRAINING PROGRAM

## 2019-06-29 PROCEDURE — G8987 SELF CARE CURRENT STATUS: HCPCS

## 2019-06-29 PROCEDURE — 81003 URINALYSIS AUTO W/O SCOPE: CPT | Performed by: STUDENT IN AN ORGANIZED HEALTH CARE EDUCATION/TRAINING PROGRAM

## 2019-06-29 PROCEDURE — G8988 SELF CARE GOAL STATUS: HCPCS

## 2019-06-29 PROCEDURE — 97163 PT EVAL HIGH COMPLEX 45 MIN: CPT

## 2019-06-29 PROCEDURE — 83735 ASSAY OF MAGNESIUM: CPT | Performed by: STUDENT IN AN ORGANIZED HEALTH CARE EDUCATION/TRAINING PROGRAM

## 2019-06-29 PROCEDURE — 97167 OT EVAL HIGH COMPLEX 60 MIN: CPT

## 2019-06-29 PROCEDURE — 85027 COMPLETE CBC AUTOMATED: CPT | Performed by: STUDENT IN AN ORGANIZED HEALTH CARE EDUCATION/TRAINING PROGRAM

## 2019-06-29 PROCEDURE — G8978 MOBILITY CURRENT STATUS: HCPCS

## 2019-06-29 PROCEDURE — 84145 PROCALCITONIN (PCT): CPT | Performed by: STUDENT IN AN ORGANIZED HEALTH CARE EDUCATION/TRAINING PROGRAM

## 2019-06-29 PROCEDURE — 94760 N-INVAS EAR/PLS OXIMETRY 1: CPT

## 2019-06-29 PROCEDURE — 80048 BASIC METABOLIC PNL TOTAL CA: CPT | Performed by: STUDENT IN AN ORGANIZED HEALTH CARE EDUCATION/TRAINING PROGRAM

## 2019-06-29 PROCEDURE — 94640 AIRWAY INHALATION TREATMENT: CPT

## 2019-06-29 PROCEDURE — 99221 1ST HOSP IP/OBS SF/LOW 40: CPT | Performed by: INTERNAL MEDICINE

## 2019-06-29 PROCEDURE — 85379 FIBRIN DEGRADATION QUANT: CPT | Performed by: STUDENT IN AN ORGANIZED HEALTH CARE EDUCATION/TRAINING PROGRAM

## 2019-06-29 RX ORDER — METHYLPREDNISOLONE SODIUM SUCCINATE 125 MG/2ML
60 INJECTION, POWDER, LYOPHILIZED, FOR SOLUTION INTRAMUSCULAR; INTRAVENOUS EVERY 12 HOURS SCHEDULED
Status: DISCONTINUED | OUTPATIENT
Start: 2019-06-29 | End: 2019-06-30

## 2019-06-29 RX ORDER — ONDANSETRON 2 MG/ML
4 INJECTION INTRAMUSCULAR; INTRAVENOUS EVERY 6 HOURS PRN
Status: DISCONTINUED | OUTPATIENT
Start: 2019-06-29 | End: 2019-07-03 | Stop reason: HOSPADM

## 2019-06-29 RX ADMIN — HEPARIN SODIUM 5000 UNITS: 5000 INJECTION INTRAVENOUS; SUBCUTANEOUS at 05:05

## 2019-06-29 RX ADMIN — GABAPENTIN 300 MG: 300 CAPSULE ORAL at 15:28

## 2019-06-29 RX ADMIN — IPRATROPIUM BROMIDE AND ALBUTEROL SULFATE 3 ML: 2.5; .5 SOLUTION RESPIRATORY (INHALATION) at 00:10

## 2019-06-29 RX ADMIN — AMLODIPINE BESYLATE 10 MG: 10 TABLET ORAL at 09:06

## 2019-06-29 RX ADMIN — GABAPENTIN 300 MG: 300 CAPSULE ORAL at 09:06

## 2019-06-29 RX ADMIN — SUCRALFATE 500 MG: 1 SUSPENSION ORAL at 06:09

## 2019-06-29 RX ADMIN — IPRATROPIUM BROMIDE AND ALBUTEROL SULFATE 3 ML: 2.5; .5 SOLUTION RESPIRATORY (INHALATION) at 23:02

## 2019-06-29 RX ADMIN — SODIUM CHLORIDE 75 ML/HR: 0.9 INJECTION, SOLUTION INTRAVENOUS at 20:11

## 2019-06-29 RX ADMIN — METHYLPREDNISOLONE SODIUM SUCCINATE 60 MG: 125 INJECTION, POWDER, FOR SOLUTION INTRAMUSCULAR; INTRAVENOUS at 09:06

## 2019-06-29 RX ADMIN — IPRATROPIUM BROMIDE AND ALBUTEROL SULFATE 3 ML: 2.5; .5 SOLUTION RESPIRATORY (INHALATION) at 11:14

## 2019-06-29 RX ADMIN — METHYLPREDNISOLONE SODIUM SUCCINATE 60 MG: 125 INJECTION, POWDER, FOR SOLUTION INTRAMUSCULAR; INTRAVENOUS at 20:04

## 2019-06-29 RX ADMIN — ENOXAPARIN SODIUM 90 MG: 100 INJECTION SUBCUTANEOUS at 20:06

## 2019-06-29 RX ADMIN — ONDANSETRON 4 MG: 2 INJECTION INTRAMUSCULAR; INTRAVENOUS at 18:00

## 2019-06-29 RX ADMIN — IPRATROPIUM BROMIDE AND ALBUTEROL SULFATE 3 ML: 2.5; .5 SOLUTION RESPIRATORY (INHALATION) at 15:02

## 2019-06-29 RX ADMIN — IPRATROPIUM BROMIDE AND ALBUTEROL SULFATE 3 ML: 2.5; .5 SOLUTION RESPIRATORY (INHALATION) at 07:21

## 2019-06-29 RX ADMIN — PANTOPRAZOLE SODIUM 40 MG: 40 TABLET, DELAYED RELEASE ORAL at 09:06

## 2019-06-29 RX ADMIN — IPRATROPIUM BROMIDE AND ALBUTEROL SULFATE 3 ML: 2.5; .5 SOLUTION RESPIRATORY (INHALATION) at 19:43

## 2019-06-29 RX ADMIN — GABAPENTIN 300 MG: 300 CAPSULE ORAL at 20:04

## 2019-06-29 RX ADMIN — SUCRALFATE 500 MG: 1 SUSPENSION ORAL at 15:27

## 2019-06-29 RX ADMIN — HEPARIN SODIUM 5000 UNITS: 5000 INJECTION INTRAVENOUS; SUBCUTANEOUS at 13:08

## 2019-06-29 RX ADMIN — IPRATROPIUM BROMIDE AND ALBUTEROL SULFATE 3 ML: 2.5; .5 SOLUTION RESPIRATORY (INHALATION) at 03:03

## 2019-06-29 RX ADMIN — FINASTERIDE 5 MG: 5 TABLET, FILM COATED ORAL at 09:06

## 2019-06-29 RX ADMIN — TAMSULOSIN HYDROCHLORIDE 0.4 MG: 0.4 CAPSULE ORAL at 09:06

## 2019-06-29 RX ADMIN — SODIUM CHLORIDE 75 ML/HR: 0.9 INJECTION, SOLUTION INTRAVENOUS at 11:21

## 2019-06-29 RX ADMIN — PANTOPRAZOLE SODIUM 40 MG: 40 TABLET, DELAYED RELEASE ORAL at 17:13

## 2019-06-29 RX ADMIN — METHYLPREDNISOLONE SODIUM SUCCINATE 60 MG: 125 INJECTION, POWDER, FOR SOLUTION INTRAMUSCULAR; INTRAVENOUS at 01:05

## 2019-06-29 NOTE — PROGRESS NOTES
Progress Note - Axel [employer] 1945, 75 y o  male MRN: [id number]    Unit/Bed#: [address] Encounter: [id number]    Primary Care Provider: Karla Mitchell MD   Date and time admitted to hospital: 6/28/2019  5:22 PM    * Shortness of breath  Assessment & Plan  Patient complaining of increased shortness of breath upon admission  ABG upon admission pH 7 524, pCO2 27, bicarb 21 3, pO2 98 8   WNL  In ED, patient received Solu-Medrol 125 mg IV, DuoNeb 3 mL neb, weaned off of BiPAP  Patient afebrile and WBC WNL upon admission at 8 36   · CXR: Very mild increased reticular interstitial markings in the right upper lobe, possibly representing very early infiltrate  · Duo nebs q 4h and q 2h p r n  · Decrease Solu-Medrol to 60 mg q12h, continue to wean  · O2 via nasal cannula, titrate as needed  · Follow-up procalcitonin    Insomnia  Assessment & Plan  · Zolpidem 10 mg p o  HS p r n , short-term    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 (HCC)  Assessment & Plan  · Two venous ulcerations left lower extremity lateral and medial aspect distal left lower extremity  · Wound care    Chronic venous insufficiency  Assessment & Plan  · Patient with venous ulcers left lower extremity, follows with vascular and wound care outpatient  Neuropathy  Assessment & Plan  · Continue home medication gabapentin 300 mg p o  T i d  Centrilobular emphysema ([facility] [address] )  Assessment & Plan  · History noted upon admission    [place] (acute kidney injury) ([facility] [address] )  Assessment & Plan  Upon admission, creatinine 1 97  Baseline around 1 2-1 3  Patient with history of recent Lasix use for left lower extremity swelling  · Lasix held  · Avoid nephrotoxic agents  · NS 75 cc/hour  · Monitor renal function with daily BMP, Cr 1 61 this AM    Former smoker  Assessment & Plan  Smoked pack per day or more over 20 years, quit 2 years ago  BPH (benign prostatic hyperplasia)  Assessment & Plan  · Continue Flomax 0 4 mg p o  daily    Chronic respiratory failure with hypoxia, on home O2 therapy ([facility] [address] )  Assessment & Plan  · Patient on 2 L O2 home oxygen, occasionally up to 3 L as needed  Essential hypertension  Assessment & Plan  · Continue home medication amlodipine 10 mg p o  daily  · Monitor blood pressures with scheduled vitals    Gastroesophageal reflux disease  Assessment & Plan  · Continue home medications  · Protonix 40 mg p o  b i d  · Carafate 500 mg p o  b i d  A c  AAT (alpha-1-antitrypsin) deficiency (HCC)  Assessment & Plan  · History noted upon admission     Subjective/Objective     Subjective:   Patient seen and examined at bedside  No acute events overnight  Patient denies headache, fevers, dizziness, CP, SOB, nausea, vomiting, diarrhea, constipation and urinary symptoms  Objective:  Vitals: Blood pressure 144/79, pulse 91, temperature 98 1 °F (36 7 °C), temperature source Oral, resp  rate 20, height 6' 5" (1 956 m), weight 85 kg (187 lb 6 3 oz), SpO2 97 %  ,Body mass index is 22 22 kg/m²  Intake/Output Summary (Last 24 hours) at 6/29/2019 1140  Last data filed at 6/29/2019 5692  Gross per 24 hour   Intake 240 ml   Output 600 ml   Net -360 ml     Invasive Devices     Peripheral Intravenous Line            Peripheral IV 06/28/19 Left Forearm less than 1 day              Physical Exam:   General: Pt is AAO x 3, not in any acute distress  Cardio: RRR, S1 and S2 +, no murmurs/rubs/gallops/clicks  Resp: CTA b/l, no wheezes/rales/rhonchi, poor respiratory effort, seems to be at baseline  Abdomen: soft, NT/ND, BS+  Extremities: no cyanosis or edema  PP 2+ b/l     Lab, Imaging and other studies: I have personally reviewed pertinent reports      VTE Pharmacologic Prophylaxis: Heparin  VTE Mechanical Prophylaxis: reason for no mechanical VTE prophylaxis left foot wound    Edwardo Baca MD  6/29/2019  11:40 AM

## 2019-06-29 NOTE — PLAN OF CARE
Problem: PAIN - ADULT  Goal: Verbalizes/displays adequate comfort level or baseline comfort level  Description  Interventions:  - Encourage patient to monitor pain and request assistance  - Assess pain using appropriate pain scale  - Administer analgesics based on type and severity of pain and evaluate response  - Implement non-pharmacological measures as appropriate and evaluate response  - Consider cultural and social influences on pain and pain management  - Notify physician/advanced practitioner if interventions unsuccessful or patient reports new pain  Outcome: Progressing  Note:   No pain at this time     Problem: INFECTION - ADULT  Goal: Absence or prevention of progression during hospitalization  Description  INTERVENTIONS:  - Assess and monitor for signs and symptoms of infection  - Monitor lab/diagnostic results  - Monitor all insertion sites, i e  indwelling lines, tubes, and drains  - Monitor endotracheal (as able) and nasal secretions for changes in amount and color  - [place] appropriate cooling/warming therapies per order  - Administer medications as ordered  - Instruct and encourage patient and family to use good hand hygiene technique  - Identify and instruct in appropriate isolation precautions for identified infection/condition  Outcome: Progressing  Note:   No s/s of infection at this time     Problem: SAFETY ADULT  Goal: Patient will remain free of falls  Description  INTERVENTIONS:  - Assess patient frequently for physical needs  -  Identify cognitive and physical deficits and behaviors that affect risk of falls    -  [place] fall precautions as indicated by assessment   - Educate patient/family on patient safety including physical limitations  - Instruct patient to call for assistance with activity based on assessment  - Modify environment to reduce risk of injury  - Consider OT/PT consult to assist with strengthening/mobility  Outcome: Progressing     Problem: DISCHARGE PLANNING  Goal: Discharge to home or other facility with appropriate resources  Description  INTERVENTIONS:  - Identify barriers to discharge w/patient and caregiver  - Arrange for needed discharge resources and transportation as appropriate  - Identify discharge learning needs (meds, wound care, etc )  - Arrange for interpretive services to assist at discharge as needed  - Refer to Case Management Department for coordinating discharge planning if the patient needs post-hospital services based on physician/advanced practitioner order or complex needs related to functional status, cognitive ability, or social support system  Outcome: Progressing     Problem: RESPIRATORY - ADULT  Goal: Achieves optimal ventilation and oxygenation  Description  INTERVENTIONS:  - Assess for changes in respiratory status  - Assess for changes in mentation and behavior  - Position to facilitate oxygenation and minimize respiratory effort  - Oxygen administration by appropriate delivery method based on oxygen saturation (per order) or ABGs  - Initiate smoking cessation education as indicated  - Encourage broncho-pulmonary hygiene including cough, deep breathe, Incentive Spirometry  - Assess the need for suctioning and aspirate as needed  - Assess and instruct to report SOB or any respiratory difficulty  - Respiratory Therapy support as indicated  Outcome: Progressing     Problem: Potential for Falls  Goal: Patient will remain free of falls  Description  INTERVENTIONS:  - Assess patient frequently for physical needs  -  Identify cognitive and physical deficits and behaviors that affect risk of falls    -  [place] fall precautions as indicated by assessment   - Educate patient/family on patient safety including physical limitations  - Instruct patient to call for assistance with activity based on assessment  - Modify environment to reduce risk of injury  - Consider OT/PT consult to assist with strengthening/mobility  Outcome: Progressing

## 2019-06-29 NOTE — PHYSICIAN ADVISOR
Current patient class: Inpatient  The patient is currently on Hospital Day: 2 at [address] [place] [address]       The patient is  documented to require at least a 2nd midnight in the hospital  As such the patient is  expected to satisfy the 2 midnight benchmark and is therefore eligible for inpatient admission  After review of the relevant documentation, labs, vital signs and test results, the patient is appropriate for INPATIENT ADMISSION  Admission to the hospital as an inpatient is a complex decision making process which requires the practitioner to consider the patients presenting complaint, history and physical examination and all relevant testing  With this in mind, in this case, the patient was deemed appropriate for INPATIENT ADMISSION  After review of the documentation and testing available at the time of the admission I concur with this clinical determination of medical necessity  Rationale is as follows: The patient is a 75 yrs Male who presented to the ED at 6/28/2019  5:22 PM with a chief complaint of Shortness of Breath (hx COPD, [id number], increasing for past hour)  Patient received duoneb and IV steroids and required Bi PAP  in ED and admitted for shortness of breath and chronic respiratory failure with hypoxia  - labs showed CLAYTON , he was started on IV fluids , IV steroids and nebulizer     He is appropriate for inpatient admission     The patients vitals on arrival were ED Triage Vitals   Temperature Pulse Respirations Blood Pressure SpO2   06/28/19 1735 06/28/19 1724 06/28/19 1724 06/28/19 1724 06/28/19 1724   99 4 °F (37 4 °C) 93 (!) 32 (!) 173/76 95 %      Temp Source Heart Rate Source Patient Position - Orthostatic VS BP Location FiO2 (%)   06/28/19 1735 06/28/19 1724 06/28/19 1724 06/28/19 1724 --   Tympanic Monitor Lying Right arm       Pain Score       06/28/19 1724       No Pain           Past Medical History:   Diagnosis Date    Anesthesia complication     Difficult to wake up    Arthritis     BPH (benign prostatic hyperplasia)     urinary frequency    Cancer (HCC)     basal cell neck, face    COPD (chronic obstructive pulmonary disease) (HCC)     Full dentures     Hypertension     controlled    Irritable bowel syndrome     Kidney stone     at least 7 episodes    Liver disease     Alpha 1- enzyme deficiency - diagnosed 2002  has been on weekly replacement therapy since then    Pulmonary emphysema ([facility] [address] )     1/25/15  FEV1 - 2 45 liters or 59% of predicted    RSV infection 12/2017    Wears glasses     for driving only     Past Surgical History:   Procedure Laterality Date    BACK SURGERY  2008    discectomy    COLONOSCOPY      COLONOSCOPY N/A 3/10/2017    Procedure: Khoa Odom;  Surgeon: Mar Barfield MD;  [facility]; Service:    Shayna Cotton      removal of kidney stones    ESOPHAGOGASTRODUODENOSCOPY N/A 3/10/2017    Procedure: ESOPHAGOGASTRODUODENOSCOPY (EGD); Surgeon: Mar Barfield MD;  Location: [facility] GI LAB; Service:     LITHOTRIPSY      DC ESOPHAGOGASTRODUODENOSCOPY TRANSORAL DIAGNOSTIC N/A 11/20/2018    Procedure: ESOPHAGOGASTRODUODENOSCOPY (EGD); Surgeon: Mar Barfield MD;  Location: [facility] GI LAB;   Service: Gastroenterology    TONSILLECTOMY      VEIN LIGATION AND STRIPPING Bilateral     1980's       The patient was admitted to the hospital at [id number] on 6/28/19 for the following diagnosis:  Shortness of breath [R06 02]  COPD exacerbation ([facility] [address] ) [J44 1]  Acute kidney injury ([facility] [address] ) [N17 9]    Consults have been placed to:   None    Vitals:    06/29/19 0303 06/29/19 0340 06/29/19 0722 06/29/19 0910   BP:  133/58  144/79   BP Location:    Right arm   Pulse:  69  91   Resp:  18 16 20   Temp:  98 1 °F (36 7 °C)  98 1 °F (36 7 °C)   TempSrc:    Oral   SpO2: 96% 97% 99% 97%   Weight:       Height:           Most recent labs:    Recent Labs     06/28/19  1732 06/29/19  0459   WBC 8 36 7 65   HGB 13 9 13 3   HCT 39 8 39 0    186 K 3 9 4 0   CALCIUM 9 0 8 9   BUN 17 20   CREATININE 1 97* 1 61*   INR 1 01  --    TROPONINI <0 02  --    AST 23  --    ALT 33  --    ALKPHOS 82  --        Scheduled Meds:  Current Facility-Administered Medications:  amLODIPine 10 mg Oral Daily Camilla Mccauley MD    finasteride 5 mg Oral QAM Camilla Mccauley MD    gabapentin 300 mg Oral TID Camilla Mccauley MD    heparin (porcine) 5,000 Units Subcutaneous [facility] Camilla Mccauley MD    ipratropium-albuterol 3 mL Nebulization Q4H Camilla Mccauley MD    ipratropium-albuterol 3 mL Nebulization Q2H PRN Camilla Mccauley MD    methylPREDNISolone sodium succinate 60 mg Intravenous Q12H Ashley Mosley MD    pantoprazole 40 mg Oral BID Camilla Mccauley MD    sodium chloride 75 mL/hr Intravenous Continuous Camilla Mccauley MD Last Rate: 75 mL/hr (06/28/19 2259)   sucralfate 500 mg Oral BID AC Camilla Mccauley MD    tamsulosin 0 4 mg Oral Daily Camilla Mccauley MD    zolpidem 10 mg Oral HS PRN Camilla Mccauley MD      Continuous Infusions:  sodium chloride 75 mL/hr Last Rate: 75 mL/hr (06/28/19 2259)     PRN Meds: ipratropium-albuterol    zolpidem    Surgical procedures (if appropriate):

## 2019-06-29 NOTE — ASSESSMENT & PLAN NOTE
· Two venous ulcerations left lower extremity lateral and medial aspect distal left lower extremity  · Wound care

## 2019-06-29 NOTE — PHYSICAL THERAPY NOTE
PT EVALUATION       06/29/19 0905   Note Type   Note type Eval/Treat   Pain Assessment   Pain Assessment No/denies pain   Home Living   Type of [address] One level  (2 TEMI)   Home Equipment Cane;Walker  (2-3L02)   Prior Function   Level of [place] Independent with ADLs and functional mobility  (ambulates with cane and 02)   Lives With Alone   Restrictions/Precautions   Other Precautions Fall Risk;O2   General   Additional Pertinent History Pt admitted with SOB  Cognition   Overall Cognitive Status WFL   RLE Assessment   RLE Assessment   (ROM WFL, MMT hip4-/5, knee 4/5, ankles 3/5)   LLE Assessment   LLE Assessment   (ROM WFL, MMT hip4-/5, knee 4/5, ankles 3/5)   Light Touch   RLE Light Touch Impaired   LLE Light Touch Impaired   Bed Mobility   Supine to Sit 7  Independent   Transfers   Sit to Stand 4  Minimal assistance   Additional items Increased time required   Stand to Sit 4  Minimal assistance   Additional items Verbal cues   Stand pivot 4  Minimal assistance   Ambulation/Elevation   Gait pattern   (flexed posture)   Gait Assistance 4  Minimal assist   Assistive Device SPC  (3L02)   Distance 100 feet   Balance   Static Sitting Fair +   Dynamic Sitting Fair   Static Standing Fair   Dynamic Standing Fair   Endurance Deficit   Endurance Deficit   (Sp02 >95% with activity on 3L02 however +SOB with activity)   Activity Tolerance   Activity Tolerance Patient limited by fatigue;Patient tolerated treatment well   Assessment   Prognosis Good   Problem List Decreased strength;Decreased endurance; Impaired balance;Decreased mobility   Assessment Patient seen for Physical Therapy evaluation  Patient admitted with Shortness of breath  Comorbidities affecting patient's physical performance include: emphysema, neuropathy, htn, causalgia R LE, R LE wound    Personal factors affecting patient at time of initial evaluation include: ambulating with assistive device, stairs to enter home, inability to navigate community distances and inability to navigate level surfaces without external assistance  Prior to admission, patient was independent with functional mobility with cane  Please find objective findings from Physical Therapy assessment regarding body systems outlined above with impairments and limitations including weakness, impaired balance, decreased endurance, decreased activity tolerance, decreased functional mobility tolerance, fall risk and SOB upon exertion  The Barthel Index was used as a functional outcome tool presenting with a score of 55 today indicating marked limitations of functional mobility and ADLS  Patient's clinical presentation is currently unstable/unpredictable as seen in patient's presentation of increased fall risk, new onset of impairment of functional mobility, decreased endurance and new onset of weakness  Pt would benefit from continued Physical Therapy treatment to address deficits as defined above and maximize level of functional mobility  As demonstrated by objective findings, the assigned level of complexity for this evaluation is high  Goals   Patient Goals "be less short of breath"   STG Expiration Date 07/06/19   Short Term Goal #1 supervision transfers, supervision ambulation with cane indoor level surfaces 150 feet, supervision up and down 2 steps so pt can enter and exit his home    LTG Expiration Date 07/13/19   Long Term Goal #1 independent ambulation outdoor surfaces with cane 200 feet without SOB, improve LE strength by 1/2 MMT score to imrpove ability to transfer, improve standing dynamic balance to at least fair + to decrease risk of falls  Plan   Treatment/Interventions ADL retraining;Functional transfer training;LE strengthening/ROM; Elevations; Therapeutic exercise; Endurance training;Gait training;Bed mobility; Equipment eval/education;Patient/family training   PT Frequency 5x/wk   Recommendation   Recommendation Home PT   Equipment Recommended   (pt has a cane and walker)   Barthel Index   Feeding 10   Bathing 0   Grooming Score 0   Dressing Score 5   Bladder Score 10   Bowels Score 10   Toilet Use Score 5   Transfers (Bed/Chair) Score 10   Mobility (Level Surface) Score 0   Stairs Score 5   Barthel Index Score 54   Licensure   [place] License Number  Roseann Stovall PT [id number]

## 2019-06-29 NOTE — H&P
H&P Exam - Gregorio Cha 75 y o  male MRN: [id number]    Unit/Bed#: ED 07 Encounter: [id number]    Assessment & Plan:    69-year-old male past medical history of COPD, alpha-1 enzyme deficiency, pulmonary emphysema, BPH, hypertension, IBS, history nephrolithiasis, chronic venous insufficiency, basal cell carcinoma, BPH, and arthritis presents with shortness of breath  Patient will be admitted inpatient status to the general medical floor under the service of Dr Kamala El with anticipated stay of greater than 2 midnights  * Shortness of breath  Assessment & Plan  Patient complaining of increased shortness of breath upon admission  ABG upon admission pH 7 524, pCO2 27, bicarb 21 3, pO2 98 8   WNL  In ED, patient received Solu-Medrol 125 mg IV, DuoNeb 3 mL neb, weaned off of BiPAP  Patient afebrile and WBC WNL upon admission at 8 36   · Chest x-ray pending  · Duo nebs q 4h and q 2h p r n   · Solu-Medrol 60 mg q 8h  · O2 via nasal cannula, titrate as needed  · Follow-up procalcitonin    Chronic respiratory failure with hypoxia, on home O2 therapy ([facility] [address] )  Assessment & Plan  · Patient on 2 L O2 home oxygen, occasionally up to 3 L as needed  CLAYTON (acute kidney injury) ([address] )  Assessment & Plan  Upon admission, creatinine 1 97  Baseline around 1 2-1 3  Patient with history of recent Lasix use for left lower extremity swelling    · Lasix held  · Avoid nephrotoxic agents  · NS 70 cc/hour  · Monitor renal function with daily BMP    Centrilobular emphysema (HCC)  Assessment & Plan  · History noted upon admission    AAT (alpha-1-antitrypsin) deficiency (HCC)  Assessment & Plan  · History noted upon admission    Essential hypertension  Assessment & Plan  · Continue home medication amlodipine 10 mg p o  daily  · Monitor blood pressures with scheduled vitals    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 (HCC)  Assessment & Plan  · Two venous ulcerations left lower extremity lateral and medial aspect distal left lower extremity  · Wound care    Chronic venous insufficiency  Assessment & Plan  · Patient with venous ulcers left lower extremity, follows with vascular and wound care outpatient  Neuropathy  Assessment & Plan  · Continue home medication gabapentin 300 mg p o  T i d     Gastroesophageal reflux disease  Assessment & Plan  · Continue home medications  · Protonix 40 mg p o  b i d  · Carafate 500 mg p o  b i d  A c     BPH (benign prostatic hyperplasia)  Assessment & Plan  · Continue Flomax 0 4 mg p o  daily    Former smoker  Assessment & Plan  Smoked pack per day or more over 20 years, quit 2 years ago  Insomnia  Assessment & Plan  · Zolpidem 10 mg p o  HS p r n , short-term    Global:  Full code, DVT prophylaxis:  Heparin and bilateral foot pumps, replete electrolytes as needed, regular diet    History of Present Illness     60-year-old male past medical history of COPD, alpha-1 enzyme deficiency, pulmonary emphysema, BPH, hypertension, IBS, history nephrolithiasis, chronic venous insufficiency, basal cell carcinoma, BPH, and arthritis presents with shortness of breath  Patient states shortness of breath exacerbated around 4:00 p m  "I was breathing like a freight train, felt like I was going to pass out at any moment"  Patient states ambulation exacerbates shortness of breath and prescribed inhalers alleviate shortness of breath for short durations of time  Patient denies any chest pain, fever, chills, congestion, rhinorrhea, and sore throat  Patient admits to occasional nonproductive cough  Patient follows with outpatient Pulmonology Ariane Briones [id number]  Patient was seen in May by Dr Randall Melo, history of pulmonary embolism, anticoagulation course completed  Patient also follows with vascular and wound clinic for left lower extremity venous ulcers "the size of a quarter"  Patient lives alone, uses cane to ambulate  Past smoker, pack per day for over 20 years, quit 2 years ago    Patient uses 2 L oxygen at home, sometimes up to 3 as needed  ED:  Solu-Medrol 125 mg IV, DuoNeb 3 mL neb    Review of Systems   Constitutional: Negative for chills and fever  HENT: Negative for congestion, ear pain, rhinorrhea and sore throat  Respiratory: Positive for shortness of breath  Cough: Occasional, nonproductive  Cardiovascular: Negative for chest pain  Gastrointestinal: Negative for blood in stool, constipation, nausea and vomiting  Abdominal pain:  abdominal discomfort, attributes to pending bowel movement  Diarrhea:  occasionally  Skin: Positive for wound (LLE)  Historical Information   Past Medical History:   Diagnosis Date    Anesthesia complication     Difficult to wake up    Arthritis     BPH (benign prostatic hyperplasia)     urinary frequency    Cancer (HCC)     basal cell neck, face    COPD (chronic obstructive pulmonary disease) (HCC)     Full dentures     Hypertension     controlled    Irritable bowel syndrome     Kidney stone     at least 7 episodes    Liver disease     Alpha 1- enzyme deficiency - diagnosed 2002  has been on weekly replacement therapy since then    Pulmonary emphysema ([facility] [address] )     1/25/15  FEV1 - 2 45 liters or 59% of predicted    RSV infection 12/2017    Wears glasses     for driving only     Past Surgical History:   Procedure Laterality Date    BACK SURGERY  2008    discectomy    COLONOSCOPY      COLONOSCOPY N/A 3/10/2017    Procedure: Otelia Prior;  Surgeon: Enzo Cheng MD;  Location: [facility] GI LAB; Service:    Estefani Pelaez      removal of kidney stones    ESOPHAGOGASTRODUODENOSCOPY N/A 3/10/2017    Procedure: ESOPHAGOGASTRODUODENOSCOPY (EGD); Surgeon: Enzo Cheng MD;  Location: [facility] GI LAB; Service:     LITHOTRIPSY      IN ESOPHAGOGASTRODUODENOSCOPY TRANSORAL DIAGNOSTIC N/A 11/20/2018    Procedure: ESOPHAGOGASTRODUODENOSCOPY (EGD); Surgeon: Enzo Cheng MD;  Location: [facility] GI LAB;   Service: Gastroenterology   [facility] TONSILLECTOMY      VEIN LIGATION AND STRIPPING Bilateral          Social History   Social History     Substance and Sexual Activity   Alcohol Use No     Social History     Substance and Sexual Activity   Drug Use No     Social History     Tobacco Use   Smoking Status Former Smoker    Packs/day: 1 00    Years: 60 00    Pack years: 60 00    Last attempt to quit: 2017    Years since quittin 8   Smokeless Tobacco Never Used   Tobacco Comment    quit in 2017     Family History:   Family History   Problem Relation Age of Onset    Emphysema Mother         never smoked    Emphysema Father     Cancer Brother         colon    Colon cancer Brother     Ulcerative colitis Family     Liver disease Family        Meds/Allergies   PTA meds:   Prior to Admission Medications   Prescriptions Last Dose Informant Patient Reported? Taking? CARAFATE 1 GM/10ML suspension  Self No No   Sig: TAKE 10 ML (1G TOTAL) BY MOUTH 4 (FOUR) TIMES A DAY   OXYGEN-HELIUM IN  Self Yes No   Sig: Inhale 2L at rest- 3L with activity   ZEMAIRA injection  Self Yes No   Sig: Once a week-    albuterol (2 5 mg/3 mL) 0 083 % nebulizer solution  Self Yes No   Sig: Inhale 1 each every 4 (four) hours as needed   albuterol (PROVENTIL HFA,VENTOLIN HFA) 90 mcg/act inhaler  Self No No   Sig: Inhale 2 puffs 4 (four) times a day   amLODIPine (NORVASC) 10 mg tablet  Self No No   Sig: TAKE ONE TABLET BY MOUTH ONE TIME DAILY    cholestyramine (QUESTRAN) 4 g packet  Self No No   Sig: Take 1 packet (4 g total) by mouth 3 (three) times a day with meals   finasteride (PROSCAR) 5 mg tablet  Self Yes No   Sig: Take 5 mg by mouth every morning     fluticasone (FLONASE) 50 mcg/act nasal spray  Self No No   Si sprays into each nostril daily   fluticasone-umeclidinium-vilanterol (TRELEGY ELLIPTA) 100-62 5-25 MCG/INH inhaler   No No   Sig: Inhale 1 puff daily for 30 days Rinse mouth after use     furosemide (LASIX) 20 mg tablet  Self Yes No gabapentin (NEURONTIN) 300 mg capsule  Self No No   Sig: Take 1 capsule (300 mg total) by mouth 3 (three) times a day   ipratropium-albuterol (DUO-NEB) 0 5-2 5 mg/3 mL nebulizer solution  Self No No   Sig: Take 1 vial (3 mL total) by nebulization 4 (four) times a day   Patient not taking: Reported on 6/28/2019   ipratropium-albuterol (DUO-NEB) 0 5-2 5 mg/3 mL nebulizer solution  Self No No   Sig: Take 1 vial (3 mL total) by nebulization 3 (three) times a day   nystatin (MYCOSTATIN) cream  Self No No   Sig: Apply topically 2 (two) times a day   pantoprazole (PROTONIX) 40 mg tablet  Self No No   Sig: TAKE ONE TABLET BY MOUTH TWICE DAILY    ranitidine (ZANTAC) 150 MG capsule  Self No No   Sig: Take 1 capsule (150 mg total) by mouth every evening   ranitidine (ZANTAC) 150 mg tablet  Self Yes No   tamsulosin (FLOMAX) 0 4 mg  Self No No   Sig: TAKE ONE CAPSULE BY MOUTH ONE TIME DAILY    zolpidem (AMBIEN) 10 mg tablet  Self No No   Sig: TAKE ONE TABLET BY MOUTH NIGHTLY AT BEDTIME       Facility-Administered Medications: None     Allergies   Allergen Reactions    Chantix [Varenicline]      Caused prostate infection       Objective   First Vitals:   Blood Pressure: (!) 173/76 (06/28/19 1724)  Pulse: 93 (06/28/19 1724)  Temperature: 99 4 °F (37 4 °C) (06/28/19 1735)  Temp Source: Tympanic (06/28/19 1735)  Respirations: (!) 32 (06/28/19 1724)  Weight - Scale: 85 kg (187 lb 6 3 oz) (06/28/19 1809)  SpO2: 95 % (06/28/19 1724)    Current Vitals:   Blood Pressure: 142/71 (06/28/19 2218)  Pulse: 80 (06/28/19 2218)  Temperature: 99 4 °F (37 4 °C) (06/28/19 1735)  Temp Source: Tympanic (06/28/19 1735)  Respirations: 22 (06/28/19 2218)  Weight - Scale: 85 kg (187 lb 6 3 oz) (06/28/19 1809)  SpO2: 97 % (06/28/19 2218)    No intake or output data in the 24 hours ending 06/28/19 2225    Invasive Devices     Peripheral Intravenous Line            Peripheral IV 06/28/19 Left Forearm less than 1 day                Physical Exam Constitutional: He is oriented to person, place, and time  He appears well-developed and well-nourished  Pleasant   HENT:   Head: Normocephalic and atraumatic  Right Ear: External ear normal    Left Ear: External ear normal    Nose: Nose normal    Mouth/Throat: Oropharynx is clear and moist  No oropharyngeal exudate  Eyes: Pupils are equal, round, and reactive to light  Conjunctivae and EOM are normal    Neck: Normal range of motion  Neck supple  Cardiovascular: Normal rate, regular rhythm, normal heart sounds and intact distal pulses  Pulmonary/Chest:   Slightly coarse breath sounds bilateral lower lung bases, otherwise clear to auscultation bilaterally  Patient deep breathing during interview  Abdominal: Soft  Bowel sounds are normal  There is no tenderness  Musculoskeletal: Normal range of motion  Neurological: He is alert and oriented to person, place, and time  Skin: Skin is warm and dry  Capillary refill takes less than 2 seconds  He is not diaphoretic  Ulcerations medial and lateral aspect left lower distal extremity, dressing clean, dry, and intact  Psychiatric: He has a normal mood and affect   His behavior is normal  Judgment and thought content normal        Lab Results:   Results for orders placed or performed during the hospital encounter of 06/28/19   CBC and differential   Result Value Ref Range    WBC 8 36 4 31 - 10 16 Thousand/uL    RBC 4 21 3 88 - 5 62 Million/uL    Hemoglobin 13 9 12 0 - 17 0 g/dL    Hematocrit 39 8 36 5 - 49 3 %    MCV 95 82 - 98 fL    MCH 33 0 26 8 - 34 3 pg    MCHC 34 9 31 4 - 37 4 g/dL    RDW 13 2 11 6 - 15 1 %    MPV 9 0 8 9 - 12 7 fL    Platelets 216 286 - 469 Thousands/uL    nRBC 0 /100 WBCs    Neutrophils Relative 62 43 - 75 %    Immat GRANS % 1 0 - 2 %    Lymphocytes Relative 24 14 - 44 %    Monocytes Relative 8 4 - 12 %    Eosinophils Relative 4 0 - 6 %    Basophils Relative 1 0 - 1 %    Neutrophils Absolute 5 26 1 85 - 7 62 Thousands/µL Immature Grans Absolute 0 04 0 00 - 0 20 Thousand/uL    Lymphocytes Absolute 2 03 0 60 - 4 47 Thousands/µL    Monocytes Absolute 0 67 0 17 - 1 22 Thousand/µL    Eosinophils Absolute 0 29 0 00 - 0 61 Thousand/µL    Basophils Absolute 0 07 0 00 - 0 10 Thousands/µL   Comprehensive metabolic panel   Result Value Ref Range    Sodium 140 136 - 145 mmol/L    Potassium 3 9 3 5 - 5 3 mmol/L    Chloride 103 100 - 108 mmol/L    CO2 23 21 - 32 mmol/L    ANION GAP 14 (H) 4 - 13 mmol/L    BUN 17 5 - 25 mg/dL    Creatinine 1 97 (H) 0 60 - 1 30 mg/dL    Glucose 118 65 - 140 mg/dL    Calcium 9 0 8 3 - 10 1 mg/dL    AST 23 5 - 45 U/L    ALT 33 12 - 78 U/L    Alkaline Phosphatase 82 46 - 116 U/L    Total Protein 7 3 6 4 - 8 2 g/dL    Albumin 3 9 3 5 - 5 0 g/dL    Total Bilirubin 0 80 0 20 - 1 00 mg/dL    eGFR 32 ml/min/1 73sq m   Troponin I   Result Value Ref Range    Troponin I <0 02 <=0 04 ng/mL   B-type natriuretic peptide   Result Value Ref Range    NT-proBNP 182 <450 pg/mL   Protime-INR   Result Value Ref Range    Protime 10 6 9 4 - 11 7 seconds    INR 1 01 0 86 - 1 16   APTT   Result Value Ref Range    PTT 25 23 - 37 seconds   Blood gas, arterial   Result Value Ref Range    pH, Arterial 7 524 (H) 7 350 - 7 450    PH ART TC 7 518 (H) 7 350 - 7 450    pCO2, Arterial 26 5 (LL) 36 0 - 44 0 mm Hg    PCO2 (TC) Arterial 27 0 (LL) 36 0 - 44 0 mm Hg    pO2, Arterial 98 8 75 0 - 129 0 mm Hg    PO2 (TC) Arterial 101 2 75 0 - 129 0 mm Hg    HCO3, Arterial 21 3 (L) 22 0 - 28 0 mmol/L    Base Excess, Arterial 0 0 mmol/L    O2 Content, Arterial 19 6 16 0 - 23 0 mL/dL    O2 HGB,Arterial  97 1 (H) 94 0 - 97 0 %    SOURCE Radial, Right     FLORENCE TEST Yes     Temperature 99 4 Degrees Fehrenheit    Non Vent type BIPAP BIPAP     IPAP 10     EPAP 5     BIPAP fio2 28 %       Imaging:   XR chest 1 view portable    (Results Pending)       EKG, Pathology, and Other Studies:  EKG-heart rate 91, sinus rhythm    Code Status: Prior    Counseling / Coordination of Care:   Greater than 50% of total time was spent with the patient and / or family counseling and / or coordination of care  A description of the counseling / coordination of care: Assessment and plan discussed with senior residents and attending

## 2019-06-29 NOTE — UTILIZATION REVIEW
Initial Clinical Review    Admission: Date/Time/Statement: 6/28/19 @ 2209     Orders Placed This Encounter   Procedures    Inpatient Admission     Standing Status:   Standing     Number of Occurrences:   1     Order Specific Question:   Admitting Physician     Answer:   Alfreda Moore     Order Specific Question:   Level of Care     Answer:   Med Surg [16]     Order Specific Question:   Estimated length of stay     Answer:   More than 2 Midnights     Order Specific Question:   Certification     Answer:   I certify that inpatient services are medically necessary for this patient for a duration of greater than two midnights  See H&P and MD Progress Notes for additional information about the patient's course of treatment  ED Arrival Information     Expected Arrival Acuity Means of Arrival Escorted By Service Admission Type    - 6/28/2019 17:22 Emergent Walk-In Self General Medicine Emergency    Arrival Complaint    -        Chief Complaint   Patient presents with    Shortness of Breath     hx COPD, [id number], increasing for past hour     Assessment/Plan:   69-year-old male past medical history of COPD, alpha-1 enzyme deficiency, pulmonary emphysema, BPH, hypertension, IBS, history nephrolithiasis, chronic venous insufficiency, basal cell carcinoma, BPH, and arthritis presents with shortness of breath  Patient will be admitted inpatient status to the general medical floor under the service of Dr Gwyn Huang with anticipated stay of greater than 2 midnights       Shortness of breath  Assessment & Plan  Patient complaining of increased shortness of breath upon admission  ABG upon admission pH 7 524, pCO2 27, bicarb 21 3, pO2 98 8   WNL  In ED, patient received Solu-Medrol 125 mg IV, DuoNeb 3 mL neb, weaned off of BiPAP   Patient afebrile and WBC WNL upon admission at 8 36   · Chest x-ray pending  · Duo nebs q 4h and q 2h p r n   · Solu-Medrol 60 mg q 8h  · O2 via nasal cannula, titrate as needed  · Follow-up procalcitonin     Chronic respiratory failure with hypoxia, on home O2 therapy ([facility] [address] )  Assessment & Plan  · Patient on 2 L O2 home oxygen, occasionally up to 3 L as needed      [facility]acute [facility]) [facility]  Assessment & Plan  Upon admission, creatinine 1 97  Baseline around 1 2-1 3  Patient with history of recent Lasix use for left lower extremity swelling  · Lasix held  · Avoid nephrotoxic agents  · NS 70 cc/hour  · Monitor renal function with daily BMP     Centrilobular emphysema (HCC)  Assessment & Plan  · History noted upon admission     AAT (alpha-1-antitrypsin) deficiency (HCC)  Assessment & Plan  · History noted upon admission     Essential hypertension  Assessment & Plan  · Continue home medication amlodipine 10 mg p o  daily  · Monitor blood pressures with scheduled vitals     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 (HCC)  Assessment & Plan  · Two venous ulcerations left lower extremity lateral and medial aspect distal left lower extremity  · Wound care     Chronic venous insufficiency  Assessment & Plan  · Patient with venous ulcers left lower extremity, follows with vascular and wound care outpatient      Neuropathy  Assessment & Plan  · Continue home medication gabapentin 300 mg p o  T i d      Gastroesophageal reflux disease  Assessment & Plan  · Continue home medications  ? Protonix 40 mg p o  b i d   ?  Carafate 500 mg p o  b i d  A c      BPH (benign prostatic hyperplasia)  Assessment & Plan  · Continue Flomax 0 4 mg p o  daily     Former smoker  Assessment & Plan  Smoked pack per day or more over 20 years, quit 2 years ago      Insomnia  Assessment & Plan  · Zolpidem 10 mg p o  HS p r n , short-term  ED Triage Vitals   Temperature Pulse Respirations Blood Pressure SpO2   06/28/19 1735 06/28/19 1724 06/28/19 1724 06/28/19 1724 06/28/19 1724   99 4 °F (37 4 °C) 93 (!) 32 (!) 173/76 95 %      Temp Source Heart Rate Source Patient Position - Orthostatic VS BP Location FiO2 (%)   06/28/19 1735 06/28/19 1724 06/28/19 1724 06/28/19 1724 --   Tympanic Monitor Lying Right arm       Pain Score       06/28/19 1724       No Pain        Wt Readings from Last 1 Encounters:   06/28/19 85 kg (187 lb 6 3 oz)     Additional Vital Signs: nasal cannula 2 liters  06/29/19 0340  98 1 °F (36 7 °C)  69  18  133/58  97 %       06/29/19 0010            Nasal cannula     06/28/19 2245  98 4 °F (36 9 °C)  88  20  148/83  98 %  Nasal cannula     06/28/19 2130    82  24Abnormal   168/81  96 %  Nasal cannula     06/28/19 2030    76  24Abnormal   148/69  94 %  Nasal cannula  Sitting   06/28/19 1930    80  26Abnormal   174/78Abnormal   95 %  Nasal cannula  Sitting   06/28/19 1830    86  20  152/84  96 %  Other (comment)   Sitting   O2 Device: bipap at 06/28/19 1830   06/28/19 1800    86  20  154/97  97 %  Other (comment)   Sitting   O2 Device: bipap at 06/28/19 1800       Pertinent Labs/Diagnostic Test Results:   6/29/2019 CxR- Mild relative increased reticular interstitial markings in the right upper lobe  6/28/2019 EKG - sinus  Normal T waves    Q waves in III  Results from last 7 days   Lab Units 06/29/19  0459 06/28/19  1732   WBC Thousand/uL 7 65 8 36   HEMOGLOBIN g/dL 13 3 13 9   HEMATOCRIT % 39 0 39 8   PLATELETS Thousands/uL 186 206   NEUTROS ABS Thousands/µL  --  5 26     Results from last 7 days   Lab Units 06/29/19  0459 06/28/19  1732   SODIUM mmol/L 138 140   POTASSIUM mmol/L 4 0 3 9   CHLORIDE mmol/L 103 103   CO2 mmol/L 21 23   ANION GAP mmol/L 14* 14*   BUN mg/dL 20 17   CREATININE mg/dL 1 61* 1 97*   EGFR ml/min/1 73sq m 41 32   CALCIUM mg/dL 8 9 9 0   MAGNESIUM mg/dL 1 9  --    PHOSPHORUS mg/dL 2 0*  --      Results from last 7 days   Lab Units 06/28/19  1732   AST U/L 23   ALT U/L 33   ALK PHOS U/L 82   TOTAL PROTEIN g/dL 7 3   ALBUMIN g/dL 3 9   TOTAL BILIRUBIN mg/dL 0 80         Results from last 7 days   Lab Units 06/29/19  0459 06/28/19  1732   GLUCOSE RANDOM mg/dL 188* 118     Results from last 7 days   Lab Units 06/26/19  1035   HEMOGLOBIN A1C % 5 4   EAG mg/dl 108     Results from last 7 days   Lab Units 06/28/19  1753   PH ART  7 524*   PCO2 ART mm Hg 26 5*   PO2 ART mm Hg 98 8   HCO3 ART mmol/L 21 3*   BASE EXC ART mmol/L 0 0   O2 CONTENT ART mL/dL 19 6   O2 HGB, ARTERIAL % 97 1*   ABG SOURCE  Radial, Right       Results from last 7 days   Lab Units 06/28/19  1732   TROPONIN I ng/mL <0 02         Results from last 7 days   Lab Units 06/28/19  1732   PROTIME seconds 10 6   INR  1 01   PTT seconds 25     Results from last 7 days   Lab Units 06/28/19  1732   NT-PRO BNP pg/mL 182       Results from last 7 days   Lab Units 06/29/19  0235   CLARITY UA  Clear   COLOR UA  Yellow   SPEC GRAV UA  >=1 030   PH UA  5 5   GLUCOSE UA mg/dl 250 (1/4%)*   KETONES UA mg/dl 15 (1+)*   BLOOD UA  Negative   PROTEIN UA mg/dl Negative   NITRITE UA  Negative   BILIRUBIN UA  Negative   UROBILINOGEN UA E U /dl 0 2   LEUKOCYTES UA  Negative       ED Treatment:   Medication Administration from 06/28/2019 1722 to 06/28/2019 2233       Date/Time Order Dose Route Action Comments     06/28/2019 1756 ipratropium-albuterol (DUO-NEB) 0 5-2 5 mg/3 mL inhalation solution 3 mL 3 mL Nebulization Given      06/28/2019 1748 methylPREDNISolone sodium succinate (Solu-MEDROL) injection 125 mg 125 mg Intravenous Given         Past Medical History:   Diagnosis Date    Anesthesia complication     Difficult to wake up    Arthritis     BPH (benign prostatic hyperplasia)     urinary frequency    Cancer (HCC)     basal cell neck, face    COPD (chronic obstructive pulmonary disease) ([facility] [address] )     Full dentures     Hypertension     controlled    Irritable bowel syndrome     Kidney stone     at least 7 episodes    Liver disease     Alpha 1- enzyme deficiency - diagnosed 2002   has been on weekly replacement therapy since then    Pulmonary emphysema ([facility] [address] )     1/25/15  FEV1 - 2 45 liters or 59% of predicted    RSV infection 12/2017    Wears glasses     for driving only     Present on Admission:   AAT (alpha-1-antitrypsin) deficiency ([facility] )   BPH (benign prostatic hyperplasia)   Centrilobular emphysema (HCC)   Chronic venous insufficiency   Essential hypertension   Gastroesophageal reflux disease   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 (HCC)   Neuropathy    Admitting Diagnosis: Shortness of breath [R06 02]  COPD exacerbation (HCC) [J44 1]  Acute kidney injury ([facility] [address] ) [N17 9]  Age/Sex: 75 y o  male  Admission Orders: 6/28/2019  2209 INPATIENT     Current Facility-Administered Medications:  amLODIPine 10 mg Oral Daily    finasteride 5 mg Oral QAM    gabapentin 300 mg Oral TID    heparin (porcine) 5,000 Units Subcutaneous Q8H [address]    ipratropium-albuterol 3 mL Nebulization Q4H    ipratropium-albuterol 3 mL Nebulization Q2H PRN    methylPREDNISolone sodium succinate 60 mg Intravenous Q12H [address]    pantoprazole 40 mg Oral BID    sodium chloride 75 mL/hr Intravenous Continuous Last Rate: 75 mL/hr (06/28/19 6528)   sucralfate 500 mg Oral BID AC    tamsulosin 0 4 mg Oral Daily    Zolpidem - used x 1 10 mg Oral HS PRN      telemetry    Network Utilization Review Department  Phone: [phone]; Fax [phone]  [email or web address]  ATTENTION: Please call with any questions or concerns to [phone]  and carefully listen to the prompts so that you are directed to the right person  Send all requests for admission clinical reviews, approved or denied determinations and any other requests to fax [phone]   All voicemails are confidential

## 2019-06-29 NOTE — PLAN OF CARE
Problem: PAIN - ADULT  Goal: Verbalizes/displays adequate comfort level or baseline comfort level  Description  Interventions:  - Encourage patient to monitor pain and request assistance  - Assess pain using appropriate pain scale  - Administer analgesics based on type and severity of pain and evaluate response  - Implement non-pharmacological measures as appropriate and evaluate response  - Consider cultural and social influences on pain and pain management  - Notify physician/advanced practitioner if interventions unsuccessful or patient reports new pain  Outcome: Progressing  Note:   Patient verbalizes no pain at this time     Problem: INFECTION - ADULT  Goal: Absence or prevention of progression during hospitalization  Description  INTERVENTIONS:  - Assess and monitor for signs and symptoms of infection  - Monitor lab/diagnostic results  - Monitor all insertion sites, i e  indwelling lines, tubes, and drains  - Monitor endotracheal (as able) and nasal secretions for changes in amount and color  - [place] appropriate cooling/warming therapies per order  - Administer medications as ordered  - Instruct and encourage patient and family to use good hand hygiene technique  - Identify and instruct in appropriate isolation precautions for identified infection/condition  Outcome: Progressing     Problem: SAFETY ADULT  Goal: Patient will remain free of falls  Description  INTERVENTIONS:  - Assess patient frequently for physical needs  -  Identify cognitive and physical deficits and behaviors that affect risk of falls    -  [place] fall precautions as indicated by assessment   - Educate patient/family on patient safety including physical limitations  - Instruct patient to call for assistance with activity based on assessment  - Modify environment to reduce risk of injury  - Consider OT/PT consult to assist with strengthening/mobility  Outcome: Progressing  Note:   precations in place, patient instucted to call for assistnace     Problem: DISCHARGE PLANNING  Goal: Discharge to home or other facility with appropriate resources  Description  INTERVENTIONS:  - Identify barriers to discharge w/patient and caregiver  - Arrange for needed discharge resources and transportation as appropriate  - Identify discharge learning needs (meds, wound care, etc )  - Arrange for interpretive services to assist at discharge as needed  - Refer to Case Management Department for coordinating discharge planning if the patient needs post-hospital services based on physician/advanced practitioner order or complex needs related to functional status, cognitive ability, or social support system  Outcome: Progressing

## 2019-06-29 NOTE — OCCUPATIONAL THERAPY NOTE
OT EVALUATION     06/29/19 0845   Note Type   Note type Eval only   Restrictions/Precautions   Other Precautions Contact/isolation;O2;Fall Risk   Pain Assessment   Pain Assessment No/denies pain   Pain Score No Pain   Diversional Activities Television   Home Living   Type of [address] One level  (2STE)   Home Equipment Cane  (oxygen 3 Liters )   Prior Function   Level of [place] Independent with ADLs and functional mobility   Lives With Alone   Receives Help From   ( 1x/month)   ADL Assistance Independent   IADLs Independent   Falls in the last 6 months 0   Comments pt reports he plays pool everyday   Subjective   Subjective "I want to go home, I went to rehab once "   ADL   Where Assessed Edge of bed   Eating Assistance 7  [address] 4  Minimal Assistance   LB Dressing Assistance 4  [address]  5  Supervision/Setup   Bed Mobility   Rolling R 5  Supervision   Rolling L 5  Supervision   Supine to Sit 5  Supervision   Sit to Supine 5  Supervision   Transfers   Sit to Stand 4  Minimal assistance   Stand to Sit 4  Minimal assistance   Toilet transfer 4  Minimal assistance   Additional Comments SPO2% 95-97% throughout   Functional Mobility   Functional Mobility 4  Minimal assistance   Additional Comments 100 feet   Additional items SPC   Balance   Static Sitting Good   Dynamic Sitting Fair +   Static Standing Fair   Dynamic Standing Fair -   Activity Tolerance   Activity Tolerance Patient limited by fatigue  (pt limited by SOB)   RUE Assessment   RUE Assessment WFL   LUE Assessment   LUE Assessment WFL   Cognition   Overall Cognitive Status WFL   Arousal/Participation Alert; Cooperative   Attention Within functional limits   Orientation Level Oriented X4   Memory Within functional limits   Following Commands Follows all commands and directions without difficulty   Assessment   Limitation Decreased endurance;Decreased ADL status; Decreased self-care trans;Decreased high-level ADLs  (SOB)   Prognosis Good   Assessment Patient evaluated by Occupational Therapy  Patient admitted with Shortness of breath  The patients occupational profile, medical and therapy history includes a extensive additional review of physical, cognitive, or psychosocial history related to current functional performance  Comorbidities affecting functional mobility and ADLS include: CLAYTON, GERD, hypertension and neuropathy, pulmonary emphysema, LE edema, chronic venous insufficiency  Prior to admission, patient was independent with functional mobility with cane, independent with ADLS, independent with IADLS, living alone in one story home with 2 steps to enter and ambulating community distances  The evaluation identifies the following performance deficits: weakness, decreased endurance, increased fall risk, decreased ADLS, decreased IADLS, decreased activity tolerance, SOB upon exertion and decreased strength, that result in activity limitations and/or participation restrictions  This evaluation requires clinical decision making of high complexity, because the patient presents with comorbidites that affect occupational performance and required significant modification of tasks or assistance with consideration of multiple treatment options  The Barthel Index was used as a functional outcome tool presenting with a score of 55, indicating marked limitations of functional mobility and ADLS  Patient will benefit from skilled Occupational Therapy services to address above deficits and facilitate a safe return to prior level of function     Goals   Patient Goals "go home"   STG Time Frame   (1-7 days)   Short Term Goal #1 Goals established to promote patient goal of "to go home":  Patient will increase standing tolerance to 5 minutes during ADL task to decrease assistance level and decrease fall risk; Patient will increase bed mobility to independent in preparation for ADLS and transfers; Patient will increase functional mobility to and from bathroom with single point cane with supervision to increase performance with ADLS and to use a toilet; Patient will tolerate 5 minutes of UE ROM/strengthening to increase general activity tolerance and performance in ADLS/IADLS; Patient will improve functional activity tolerance to 8 minutes of sustained functional tasks to increase participation in basic self-care and decrease assistance level;  Patient will be able to to verbalize understanding and perform energy conservation/proper body mechanics during ADLS and functional mobility at least 25% of the time with minimal cueing to decrease signs of fatigue and increase stamina to return to prior level of function;    LTG Time Frame   (8-14 days)   Long Term Goal #1 Patient will increase standing tolerance to 8 minutes during ADL task to decrease assistance level and decrease fall risk; Patient will increase bed mobility to independent in preparation for ADLS and transfers; Patient will increase functional mobility to and from bathroom with single point cane independently to increase performance with ADLS and to use a toilet; Patient will tolerate 8 minutes of UE ROM/strengthening to increase general activity tolerance and performance in ADLS/IADLS; Patient will improve functional activity tolerance to 12 minutes of sustained functional tasks to increase participation in basic self-care and decrease assistance level;  Patient will be able to to verbalize understanding and perform energy conservation/proper body mechanics during ADLS and functional mobility at least 50% of the time with minimalcueing to decrease signs of fatigue and increase stamina to return to prior level of function;     Functional Transfer Goals   Pt Will Perform All Functional Transfers   (STG: supervision LTG: independent)   ADL Goals   Pt Will Perform UE Dressing   (STG: Supervision LTG: independent)   Pt Will Perform LE Dressing   (STG: supervision LTG: independent)   Plan   Treatment Interventions Activityengagement; Energy conservation; Compensatory technique education; ADL retraining;Functional transfer training;UE strengthening/ROM; Endurance training;Patient/family training;Equipment evaluation/education   Goal Expiration Date 07/13/19   OT Frequency 3-5x/wk   Recommendation   OT Discharge Recommendation Home OT   Barthel Index   Feeding 10   Bathing 0   Grooming Score 0   Dressing Score 5   Bladder Score 10   Bowels Score 10   Toilet Use Score 5   Transfers (Bed/Chair) Score 10   Mobility (Level Surface) Score 0   Stairs Score 5   Barthel Index Score 54   Licensure   [place] License Number  [id number] [place] [address]# [id number]

## 2019-06-29 NOTE — SOCIAL WORK
Pt is current LOS 1  Anticipate pt will be a bundle for COPD, but has not been determined  Was on bundle program in the past   Resides alone in his home  Uses spc for ambulation, has RW  Was open to [facility], but not currently  Was also getting home therapy with [facility], but that has ended also  No STR  Uses [facility] in [place]  Has financial POA, but no other  Provided forms for pt to complete  Has home oxygen he has been using up to 3 liters daily provided by [facility]  Explained role of cm, no d/c needs anticipated  CM reviewed d/c planning process including the following: identifying help at home, patient preference for d/c planning needs, and availability of the treatment team to discuss questions or concerns patient and/or family may have regarding understanding of medications and recognizing signs and symptoms once discharged  CM also encouraged patient to follow up with all recommended appointments after discharge  Patient advised of importance for patient and family to participate in managing patient's medical well being

## 2019-06-29 NOTE — ASSESSMENT & PLAN NOTE
Upon admission, creatinine 1 97  Baseline around 1 2-1 3  Patient with history of recent Lasix use for left lower extremity swelling    · Lasix held  · Avoid nephrotoxic agents  · NS 75 cc/hour  · Monitor renal function with daily BMP, Cr 1 61 this AM

## 2019-06-29 NOTE — ASSESSMENT & PLAN NOTE
· Continue home medication amlodipine 10 mg p o  daily  · Monitor blood pressures with scheduled vitals

## 2019-06-29 NOTE — NURSING NOTE
Patient unable to tolerate SCDs or Foot Pump related to ulcers on ankle/pain  Resident aware, okay to remove per resident

## 2019-06-29 NOTE — CONSULTS
Consultation - Pulmonary Medicine   Amol Ma 75 y o  male MRN: [id number]  Unit/Bed#: 2 [place] [address] Encounter: [id number]      Assessment:  Acute exacerbation of COPD  Severe emphysema  FEV1 is 1 6 L or 40% of predicted  Chronic hypoxemic respiratory failure  Alpha 1 antitrypsin deficiency - patient received use anti protease therapy intravenously weekly  Last dose was Wednesday 6/26  Acute kidney injury  Serum creatinine 1 97  Patient receiving IV hydration and serum creatinine improved to 1 61  Patient had been on p o  Lasix recently for his leg edema  History of pulmonary embolism of left lower lobe pulmonary artery in February 2018  He was on 6 months of anticoagulation with Eliquis  Possible other etiology of his shortness of breath could be recurrent acute PE      Plan:   I reviewed patient's portable chest x-ray do not see any distinct infiltrates  Thus far no evidence of pneumonia  Agree with IV Solu-Medrol 60 mg every 12 hours  The treatments with DuoNeb every 4 hours  Oxygen at 3 liters/minute  This is setting at home  At present time no need for antibiotic therapy  Will check serum D-dimer in a m  And if significantly elevated would do CTA of chest if serum creatinine is acceptable        History of Present Illness   Physician Requesting Consult: Soyla Najjar, MD  Reason for Consult / Principal Problem: COPD exacerbation  Hx and PE limited by: none      HPI: Amol Ma is a 75y o  year old male who presents with complaints of worsening shortness of breath over the past few days  At that point with any minimal activity was getting short of breath  Denies any cough, fever or chills  No chest pain  He does have severe emphysema with FEV1 of 1 6 L or 40% of predicted  He has alpha-1 antitrypsin deficiency Thomas which was diagnosed in 2002 and receives IV weekly antitrypsin replacement therapy  Also history of small lung nodules which are likely benign    He has history of pulmonary embolism of the left lower lobe pulmonary artery and February 2018 which resolved  He had 6 months of anticoagulation with Eliquis  Venous Dopplers of her lower extremities at that time were negative for acute DVT of lower extremities  He also has history of hypertension and GERD  Echocardiogram in June 2018 showed normal left ventricular systolic function ejection with normal pulmonary artery pressure  States he had been on Lasix therapy for several days at home as he was having increased edema of his left leg compared to the right leg  This has been ordered by his vascular surgeon  Review of Systems    Historical Information   Past Medical History:   Diagnosis Date    Anesthesia complication     Difficult to wake up    Arthritis     BPH (benign prostatic hyperplasia)     urinary frequency    Cancer (HCC)     basal cell neck, face    COPD (chronic obstructive pulmonary disease) (HCC)     Full dentures     Hypertension     controlled    Irritable bowel syndrome     Kidney stone     at least 7 episodes    Liver disease     Alpha 1- enzyme deficiency - diagnosed 2002  has been on weekly replacement therapy since then    Pulmonary emphysema ([facility] [address] )     1/25/15  FEV1 - 2 45 liters or 59% of predicted    RSV infection 12/2017    Wears glasses     for driving only     Past Surgical History:   Procedure Laterality Date    BACK SURGERY  2008    discectomy    COLONOSCOPY      COLONOSCOPY N/A 3/10/2017    Procedure: Otelia Prior;  Surgeon: Enzo Cheng MD;  Location: [place] [address] GI LAB; Service:    Estefani Pelaez      removal of kidney stones    ESOPHAGOGASTRODUODENOSCOPY N/A 3/10/2017    Procedure: ESOPHAGOGASTRODUODENOSCOPY (EGD); Surgeon: Enzo Cheng MD;  Location: [facility] GI LAB; Service:     LITHOTRIPSY      MS ESOPHAGOGASTRODUODENOSCOPY TRANSORAL DIAGNOSTIC N/A 11/20/2018    Procedure: ESOPHAGOGASTRODUODENOSCOPY (EGD); Surgeon: nEzo Cheng MD;  Location: [facility] GI LAB;   Service: Gastroenterology    TONSILLECTOMY      VEIN LIGATION AND STRIPPING Bilateral          Social History   Social History     Substance and Sexual Activity   Alcohol Use Not Currently     Social History     Substance and Sexual Activity   Drug Use No     Social History     Tobacco Use   Smoking Status Former Smoker    Packs/day: 1 00    Years: 60 00    Pack years: 60 00    Last attempt to quit: 2017    Years since quittin 8   Smokeless Tobacco Never Used   Tobacco Comment    quit in 2017         Family History:   Family History   Problem Relation Age of Onset    Emphysema Mother         never smoked    Emphysema Father     Cancer Brother         colon    Colon cancer Brother     Ulcerative colitis Family     Liver disease Family        Meds/Allergies     Current Facility-Administered Medications:     amLODIPine (NORVASC) tablet 10 mg, 10 mg, Oral, Daily, Emily Guerrero MD, 10 mg at 19    finasteride (PROSCAR) tablet 5 mg, 5 mg, Oral, QAM, Emily Guerrero MD, 5 mg at 19    gabapentin (NEURONTIN) capsule 300 mg, 300 mg, Oral, TID, Emily Guerrero MD, 300 mg at 19    heparin (porcine) subcutaneous injection 5,000 Units, 5,000 Units, Subcutaneous, Q8H [facility], 5,000 Units at 19 1308 **AND** [CANCELED] Platelet count, , , Once, Emily Guerrero MD    ipratropium-albuterol (DUO-NEB) 0 5-2 5 mg/3 mL inhalation solution 3 mL, 3 mL, Nebulization, Q4H, Emily Guerrero MD, 3 mL at 19 1502    ipratropium-albuterol (DUO-NEB) 0 5-2 5 mg/3 mL inhalation solution 3 mL, 3 mL, Nebulization, Q2H PRN, Emily Guerrero MD    methylPREDNISolone sodium succinate (Solu-MEDROL) injection 60 mg, 60 mg, Intravenous, Q12H [facility], Demian Corcoran MD, 60 mg at 19    pantoprazole (PROTONIX) EC tablet 40 mg, 40 mg, Oral, BID, Emily Guerrero MD, 40 mg at 19    sodium chloride 0 9 % infusion, 75 mL/hr, Intravenous, Continuous, Emily Guerrero MD, Last Rate: 75 mL/hr at 06/29/19 1121, 75 mL/hr at 06/29/19 1121    sucralfate (CARAFATE) oral suspension 500 mg, 500 mg, Oral, BID AC, Letha Arcos MD, 500 mg at 06/29/19 0609    tamsulosin (FLOMAX) capsule 0 4 mg, 0 4 mg, Oral, Daily, Letha Arcos MD, 0 4 mg at 06/29/19 5424    zolpidem (AMBIEN) tablet 10 mg, 10 mg, Oral, HS PRN, Letha Arcos MD, 10 mg at 06/28/19 2308    Prior to Admission medications    Medication Sig Start Date End Date Taking?  Authorizing Provider   amLODIPine (NORVASC) 10 mg tablet TAKE ONE TABLET BY MOUTH ONE TIME DAILY  9/17/18  Yes Elmira Johnson MD   CARAFATE 1 GM/10ML suspension TAKE 10 ML (1G TOTAL) BY MOUTH 4 (FOUR) TIMES A DAY 1/28/19  Yes Elmira Johnson MD   cholestyramine Jodine Lacks) 4 g packet Take 1 packet (4 g total) by mouth 3 (three) times a day with meals 5/29/19  Yes Inés Steele PA-C   finasteride (PROSCAR) 5 mg tablet Take 5 mg by mouth every morning   6/20/18  Yes Historical Provider, MD   fluticasone (FLONASE) 50 mcg/act nasal spray 2 sprays into each nostril daily 7/30/18  Yes Elmira Johnson MD   furosemide (LASIX) 20 mg tablet  6/24/19  Yes Historical Provider, MD   gabapentin (NEURONTIN) 300 mg capsule Take 1 capsule (300 mg total) by mouth 3 (three) times a day 4/29/19  Yes Kiel Chaney MD   pantoprazole (PROTONIX) 40 mg tablet TAKE ONE TABLET BY MOUTH TWICE DAILY  3/11/19  Yes Inés Steele PA-C   tamsulosin (FLOMAX) 0 4 mg TAKE ONE CAPSULE BY MOUTH ONE TIME DAILY  2/6/19  Yes Marvin Andrews MD   zolpidem (AMBIEN) 10 mg tablet TAKE ONE TABLET BY MOUTH NIGHTLY AT BEDTIME  1/7/19  Yes Elmira Johnson MD   albuterol (2 5 mg/3 mL) 0 083 % nebulizer solution Inhale 1 each every 4 (four) hours as needed 4/25/17   Historical Provider, MD   albuterol (PROVENTIL HFA,VENTOLIN HFA) 90 mcg/act inhaler Inhale 2 puffs 4 (four) times a day 10/23/18   TOPHER Price   fluticasone-umeclidinium-vilanterol (TRELEGY ELLIPTA) 100-62 5-25 MCG/INH inhaler Inhale 1 puff daily for 30 days Rinse mouth after use  2/11/19 3/13/19  Geneva Sieving, CRNP   ipratropium-albuterol (DUO-NEB) 0 5-2 5 mg/3 mL nebulizer solution Take 1 vial (3 mL total) by nebulization 4 (four) times a day  Patient not taking: Reported on 6/28/2019 10/17/18   Sumanth Stuart, DO   ipratropium-albuterol (DUO-NEB) 0 5-2 5 mg/3 mL nebulizer solution Take 1 vial (3 mL total) by nebulization 3 (three) times a day 10/23/18   Geneva Sieving, CRNP   nystatin (MYCOSTATIN) cream Apply topically 2 (two) times a day  Patient not taking: Reported on 6/28/2019 10/25/18   Juan Carlos Padilla MD   OXYGEN-HELIUM IN Inhale 2L at rest- 3L with activity    Historical Provider, MD   ranitidine (ZANTAC) 150 MG capsule Take 1 capsule (150 mg total) by mouth every evening  Patient not taking: Reported on 6/28/2019 5/22/19   Reynaldo Cao PA-C   ranitidine (ZANTAC) 150 mg tablet  6/18/19   Historical Provider, MD Will Edgar injection Once a week-  5/9/18   Historical Provider, MD       Allergies   Allergen Reactions    Chantix [Varenicline]      Caused prostate infection       Objective   Vitals: Blood pressure 144/79, pulse 91, temperature 98 1 °F (36 7 °C), temperature source Oral, resp  rate 20, height 6' 5" (1 956 m), weight 85 kg (187 lb 6 3 oz), SpO2 97 %  ,Body mass index is 22 22 kg/m²  Intake/Output Summary (Last 24 hours) at 6/29/2019 1516  Last data filed at 6/29/2019 4293  Gross per 24 hour   Intake 240 ml   Output 600 ml   Net -360 ml     Invasive Devices     Peripheral Intravenous Line            Peripheral IV 06/28/19 Left Forearm less than 1 day                Physical Exam   Constitutional: He is oriented to person, place, and time  He appears well-developed  No distress  On 3 L O2 saturation is 98%  Has some mild conversational dyspnea   HENT:   Head: Normocephalic  Nose: Nose normal    Mouth/Throat: Oropharynx is clear and moist  No oropharyngeal exudate  Eyes: Pupils are equal, round, and reactive to light   Conjunctivae are normal  Neck: Neck supple  No JVD present  No tracheal deviation present  Cardiovascular: Normal rate, regular rhythm and normal heart sounds  Pulmonary/Chest: Effort normal    Lung sounds are diminished bilaterally  Abdominal: Soft  He exhibits no distension  There is no tenderness  There is no guarding  Musculoskeletal:   Has mild +1 to 2 edema of lower left tibial surface and trace edema right tibial surface  Also has bandage on left lower tibial area had site of venous stasis ulcer  No clubbing or cyanosis   Lymphadenopathy:     He has no cervical adenopathy  Neurological: He is alert and oriented to person, place, and time  Skin: Skin is warm and dry  No rash noted  Psychiatric: He has a normal mood and affect   His behavior is normal  Thought content normal        Lab Results: ABG:   Lab Results   Component Value Date    PHART 7 524 (H) 06/28/2019    XFI7IIA 26 5 (LL) 06/28/2019    PO2ART 98 8 06/28/2019    SIX0YWF 21 3 (L) 06/28/2019    BEART 0 0 06/28/2019    SOURCE Radial, Right 06/28/2019   , BNP: No results found for: BNP, CBC:   Lab Results   Component Value Date    WBC 7 65 06/29/2019    HGB 13 3 06/29/2019    HCT 39 0 06/29/2019    MCV 95 06/29/2019     06/29/2019    MCH 32 5 06/29/2019    MCHC 34 1 06/29/2019    RDW 13 0 06/29/2019    MPV 9 2 06/29/2019    NRBC 0 06/28/2019   , CMP:   Lab Results   Component Value Date     09/27/2016    K 4 0 06/29/2019    K 4 2 09/27/2016     06/29/2019     09/27/2016    CO2 21 06/29/2019    CO2 23 09/27/2016    BUN 20 06/29/2019    BUN 14 09/27/2016    CREATININE 1 61 (H) 06/29/2019    CREATININE 1 09 09/27/2016    GLUCOSE 89 09/27/2016    CALCIUM 8 9 06/29/2019    CALCIUM 9 3 09/27/2016    AST 23 06/28/2019    AST 18 09/27/2016    ALT 33 06/28/2019    ALT 16 09/27/2016    ALKPHOS 82 06/28/2019    ALKPHOS 57 09/27/2016    PROT 6 9 09/27/2016    BILITOT 1 0 09/27/2016    EGFR 41 06/29/2019   , PT/INR:   Lab Results   Component Value Date    INR 1 01 06/28/2019   , Troponin: No results found for: TROPONIN    Imaging Studies: I have personally reviewed pertinent reports  and I have personally reviewed pertinent films in PACS    EKG, Pathology, and Other Studies: I have personally reviewed pertinent reports  VTE Prophylaxis: Heparin    Code Status: Level 1 - Full Code    Counseling/Coordination of Care: Total floor / unit time spent today 30 minutes  Greater than 50% of total time was spent with the patient and / or family counseling and / or coordination of care  A description of the counseling / coordination of care: I reviewed patient's chest x-ray and history  I discussed diagnosis and treatment with him    I also spoke with his primary medical team

## 2019-06-29 NOTE — ASSESSMENT & PLAN NOTE
Patient complaining of increased shortness of breath upon admission  ABG upon admission pH 7 524, pCO2 27, bicarb 21 3, pO2 98 8   WNL  In ED, patient received Solu-Medrol 125 mg IV, DuoNeb 3 mL neb, weaned off of BiPAP  Patient afebrile and WBC WNL upon admission at 8 36   · CXR: Very mild increased reticular interstitial markings in the right upper lobe, possibly representing very early infiltrate  · Duo nebs q 4h and q 2h p r n    · Decrease Solu-Medrol to 60 mg q12h, continue to wean  · O2 via nasal cannula, titrate as needed  · Follow-up procalcitonin

## 2019-06-30 ENCOUNTER — APPOINTMENT (INPATIENT)
Dept: RADIOLOGY | Facility: HOSPITAL | Age: 74
DRG: 189 | End: 2019-06-30
Payer: MEDICARE

## 2019-06-30 PROBLEM — N17.9 AKI (ACUTE KIDNEY INJURY) (HCC): Status: RESOLVED | Noted: 2018-03-30 | Resolved: 2019-06-30

## 2019-06-30 PROBLEM — J96.21 ACUTE ON CHRONIC RESPIRATORY FAILURE WITH HYPOXIA (HCC): Status: ACTIVE | Noted: 2017-12-30

## 2019-06-30 LAB
ANION GAP SERPL CALCULATED.3IONS-SCNC: 10 MMOL/L (ref 4–13)
BASOPHILS # BLD AUTO: 0.01 THOUSANDS/ΜL (ref 0–0.1)
BASOPHILS NFR BLD AUTO: 0 % (ref 0–1)
BUN SERPL-MCNC: 16 MG/DL (ref 5–25)
CALCIUM SERPL-MCNC: 9 MG/DL (ref 8.3–10.1)
CHLORIDE SERPL-SCNC: 107 MMOL/L (ref 100–108)
CO2 SERPL-SCNC: 23 MMOL/L (ref 21–32)
CREAT SERPL-MCNC: 1.08 MG/DL (ref 0.6–1.3)
EOSINOPHIL # BLD AUTO: 0 THOUSAND/ΜL (ref 0–0.61)
EOSINOPHIL NFR BLD AUTO: 0 % (ref 0–6)
ERYTHROCYTE [DISTWIDTH] IN BLOOD BY AUTOMATED COUNT: 13.3 % (ref 11.6–15.1)
GFR SERPL CREATININE-BSD FRML MDRD: 67 ML/MIN/1.73SQ M
GLUCOSE SERPL-MCNC: 130 MG/DL (ref 65–140)
HCT VFR BLD AUTO: 38.8 % (ref 36.5–49.3)
HGB BLD-MCNC: 13 G/DL (ref 12–17)
IMM GRANULOCYTES # BLD AUTO: 0.07 THOUSAND/UL (ref 0–0.2)
IMM GRANULOCYTES NFR BLD AUTO: 1 % (ref 0–2)
LYMPHOCYTES # BLD AUTO: 1.12 THOUSANDS/ΜL (ref 0.6–4.47)
LYMPHOCYTES NFR BLD AUTO: 10 % (ref 14–44)
MAGNESIUM SERPL-MCNC: 2 MG/DL (ref 1.6–2.6)
MCH RBC QN AUTO: 32.3 PG (ref 26.8–34.3)
MCHC RBC AUTO-ENTMCNC: 33.5 G/DL (ref 31.4–37.4)
MCV RBC AUTO: 96 FL (ref 82–98)
MONOCYTES # BLD AUTO: 0.45 THOUSAND/ΜL (ref 0.17–1.22)
MONOCYTES NFR BLD AUTO: 4 % (ref 4–12)
MRSA NOSE QL CULT: NORMAL
NEUTROPHILS # BLD AUTO: 9.89 THOUSANDS/ΜL (ref 1.85–7.62)
NEUTS SEG NFR BLD AUTO: 85 % (ref 43–75)
NRBC BLD AUTO-RTO: 0 /100 WBCS
PHOSPHATE SERPL-MCNC: 3 MG/DL (ref 2.3–4.1)
PLATELET # BLD AUTO: 194 THOUSANDS/UL (ref 149–390)
PMV BLD AUTO: 9.5 FL (ref 8.9–12.7)
POTASSIUM SERPL-SCNC: 4.1 MMOL/L (ref 3.5–5.3)
RBC # BLD AUTO: 4.03 MILLION/UL (ref 3.88–5.62)
SODIUM SERPL-SCNC: 140 MMOL/L (ref 136–145)
WBC # BLD AUTO: 11.54 THOUSAND/UL (ref 4.31–10.16)

## 2019-06-30 PROCEDURE — 94640 AIRWAY INHALATION TREATMENT: CPT

## 2019-06-30 PROCEDURE — 80048 BASIC METABOLIC PNL TOTAL CA: CPT | Performed by: STUDENT IN AN ORGANIZED HEALTH CARE EDUCATION/TRAINING PROGRAM

## 2019-06-30 PROCEDURE — 99232 SBSQ HOSP IP/OBS MODERATE 35: CPT | Performed by: INTERNAL MEDICINE

## 2019-06-30 PROCEDURE — 99232 SBSQ HOSP IP/OBS MODERATE 35: CPT | Performed by: FAMILY MEDICINE

## 2019-06-30 PROCEDURE — 94664 DEMO&/EVAL PT USE INHALER: CPT

## 2019-06-30 PROCEDURE — 85025 COMPLETE CBC W/AUTO DIFF WBC: CPT | Performed by: STUDENT IN AN ORGANIZED HEALTH CARE EDUCATION/TRAINING PROGRAM

## 2019-06-30 PROCEDURE — 94760 N-INVAS EAR/PLS OXIMETRY 1: CPT

## 2019-06-30 PROCEDURE — 83735 ASSAY OF MAGNESIUM: CPT | Performed by: STUDENT IN AN ORGANIZED HEALTH CARE EDUCATION/TRAINING PROGRAM

## 2019-06-30 PROCEDURE — 84100 ASSAY OF PHOSPHORUS: CPT | Performed by: STUDENT IN AN ORGANIZED HEALTH CARE EDUCATION/TRAINING PROGRAM

## 2019-06-30 PROCEDURE — 71275 CT ANGIOGRAPHY CHEST: CPT

## 2019-06-30 RX ORDER — METHYLPREDNISOLONE SODIUM SUCCINATE 40 MG/ML
20 INJECTION, POWDER, LYOPHILIZED, FOR SOLUTION INTRAMUSCULAR; INTRAVENOUS EVERY 8 HOURS SCHEDULED
Status: DISCONTINUED | OUTPATIENT
Start: 2019-06-30 | End: 2019-07-02

## 2019-06-30 RX ORDER — SODIUM CHLORIDE 9 MG/ML
75 INJECTION, SOLUTION INTRAVENOUS CONTINUOUS
Status: DISPENSED | OUTPATIENT
Start: 2019-06-30 | End: 2019-06-30

## 2019-06-30 RX ADMIN — PANTOPRAZOLE SODIUM 40 MG: 40 TABLET, DELAYED RELEASE ORAL at 10:04

## 2019-06-30 RX ADMIN — ZOLPIDEM TARTRATE 10 MG: 5 TABLET, COATED ORAL at 22:33

## 2019-06-30 RX ADMIN — GABAPENTIN 300 MG: 300 CAPSULE ORAL at 10:04

## 2019-06-30 RX ADMIN — ONDANSETRON 4 MG: 2 INJECTION INTRAMUSCULAR; INTRAVENOUS at 07:47

## 2019-06-30 RX ADMIN — ENOXAPARIN SODIUM 90 MG: 100 INJECTION SUBCUTANEOUS at 10:03

## 2019-06-30 RX ADMIN — GABAPENTIN 300 MG: 300 CAPSULE ORAL at 21:19

## 2019-06-30 RX ADMIN — FINASTERIDE 5 MG: 5 TABLET, FILM COATED ORAL at 10:04

## 2019-06-30 RX ADMIN — TAMSULOSIN HYDROCHLORIDE 0.4 MG: 0.4 CAPSULE ORAL at 10:04

## 2019-06-30 RX ADMIN — AMLODIPINE BESYLATE 10 MG: 10 TABLET ORAL at 10:04

## 2019-06-30 RX ADMIN — SUCRALFATE 500 MG: 1 SUSPENSION ORAL at 06:13

## 2019-06-30 RX ADMIN — METHYLPREDNISOLONE SODIUM SUCCINATE 20 MG: 40 INJECTION, POWDER, FOR SOLUTION INTRAMUSCULAR; INTRAVENOUS at 13:57

## 2019-06-30 RX ADMIN — IPRATROPIUM BROMIDE AND ALBUTEROL SULFATE 3 ML: 2.5; .5 SOLUTION RESPIRATORY (INHALATION) at 19:41

## 2019-06-30 RX ADMIN — SODIUM CHLORIDE 75 ML/HR: 0.9 INJECTION, SOLUTION INTRAVENOUS at 10:05

## 2019-06-30 RX ADMIN — IPRATROPIUM BROMIDE AND ALBUTEROL SULFATE 3 ML: 2.5; .5 SOLUTION RESPIRATORY (INHALATION) at 15:17

## 2019-06-30 RX ADMIN — ZOLPIDEM TARTRATE 10 MG: 5 TABLET, COATED ORAL at 00:44

## 2019-06-30 RX ADMIN — IOHEXOL 75 ML: 350 INJECTION, SOLUTION INTRAVENOUS at 08:53

## 2019-06-30 RX ADMIN — IPRATROPIUM BROMIDE AND ALBUTEROL SULFATE 3 ML: 2.5; .5 SOLUTION RESPIRATORY (INHALATION) at 11:48

## 2019-06-30 RX ADMIN — IPRATROPIUM BROMIDE AND ALBUTEROL SULFATE 3 ML: 2.5; .5 SOLUTION RESPIRATORY (INHALATION) at 03:35

## 2019-06-30 RX ADMIN — METHYLPREDNISOLONE SODIUM SUCCINATE 20 MG: 40 INJECTION, POWDER, FOR SOLUTION INTRAMUSCULAR; INTRAVENOUS at 21:19

## 2019-06-30 RX ADMIN — IPRATROPIUM BROMIDE AND ALBUTEROL SULFATE 3 ML: 2.5; .5 SOLUTION RESPIRATORY (INHALATION) at 07:38

## 2019-06-30 RX ADMIN — SUCRALFATE 500 MG: 1 SUSPENSION ORAL at 15:48

## 2019-06-30 RX ADMIN — PANTOPRAZOLE SODIUM 40 MG: 40 TABLET, DELAYED RELEASE ORAL at 17:25

## 2019-06-30 RX ADMIN — GABAPENTIN 300 MG: 300 CAPSULE ORAL at 15:49

## 2019-06-30 NOTE — PLAN OF CARE
Problem: PAIN - ADULT  Goal: Verbalizes/displays adequate comfort level or baseline comfort level  Description  Interventions:  - Encourage patient to monitor pain and request assistance  - Assess pain using appropriate pain scale  - Administer analgesics based on type and severity of pain and evaluate response  - Implement non-pharmacological measures as appropriate and evaluate response  - Consider cultural and social influences on pain and pain management  - Notify physician/advanced practitioner if interventions unsuccessful or patient reports new pain  Outcome: Progressing     Problem: INFECTION - ADULT  Goal: Absence or prevention of progression during hospitalization  Description  INTERVENTIONS:  - Assess and monitor for signs and symptoms of infection  - Monitor lab/diagnostic results  - Monitor all insertion sites, IV  - [place] appropriate cooling/warming therapies per order  - Administer medications as ordered  - Instruct and encourage patient and family to use good hand hygiene technique  - Identify and instruct in appropriate isolation precautions for identified infection/condition   Outcome: Progressing     Problem: SAFETY ADULT  Goal: Patient will remain free of falls  Description  INTERVENTIONS:  - Assess patient frequently for physical needs  -  Identify cognitive and physical deficits and behaviors that affect risk of falls    -  [place] fall precautions as indicated by assessment   - Educate patient/family on patient safety including physical limitations  - Instruct patient to call for assistance with activity based on assessment  - Modify environment to reduce risk of injury  - Consider OT/PT consult to assist with strengthening/mobility  Outcome: Progressing     Problem: DISCHARGE PLANNING  Goal: Discharge to home or other facility with appropriate resources  Description  INTERVENTIONS:  - Identify barriers to discharge w/patient and caregiver  - Arrange for needed discharge resources and transportation as appropriate  - Identify discharge learning needs (meds, wound care, etc )  - Arrange for interpretive services to assist at discharge as needed  - Refer to Case Management Department for coordinating discharge planning if the patient needs post-hospital services based on physician/advanced practitioner order or complex needs related to functional status, cognitive ability, or social support system  Outcome: Progressing     Problem: RESPIRATORY - ADULT  Goal: Achieves optimal ventilation and oxygenation  Description  INTERVENTIONS:  - Assess for changes in respiratory status  - Assess for changes in mentation and behavior  - Position to facilitate oxygenation and minimize respiratory effort  - Oxygen administration by appropriate delivery method based on oxygen saturation (per order) or ABGs  - Initiate smoking cessation education as indicated  - Encourage broncho-pulmonary hygiene including cough, deep breathe, Incentive Spirometry  - Assess the need for suctioning and aspirate as needed  - Assess and instruct to report SOB or any respiratory difficulty  - Respiratory Therapy support as indicated  Outcome: Progressing     Problem: Potential for Falls  Goal: Patient will remain free of falls  Description  INTERVENTIONS:  - Assess patient frequently for physical needs  -  Identify cognitive and physical deficits and behaviors that affect risk of falls    -  [place] fall precautions as indicated by assessment   - Educate patient/family on patient safety including physical limitations  - Instruct patient to call for assistance with activity based on assessment  - Modify environment to reduce risk of injury  - Consider OT/PT consult to assist with strengthening/mobility  Outcome: Progressing

## 2019-06-30 NOTE — ASSESSMENT & PLAN NOTE
· History noted upon admission  Likely contributory to patient's chronic respiratory failure  · Management as above    · Patient taking home Breo Ellipta daily during hospital stay  · Patient follows outpatient with Saleem Euceda

## 2019-06-30 NOTE — ASSESSMENT & PLAN NOTE
I reviewed the CT scan myself, she has 2 tiny nodules in both left and right upper lobes  The nodule in the right base is elongated/oval shaped in the minor fissure between right middle lobe and right upper lobe  Most likely this represent a lymph node but will monitor with repeat CT scan in 6 months which was ordered already and scheduled by her primary care physician in February 2019  I will see patient after that  Two venous ulcerations left lower extremity lateral and medial aspect distal left lower extremity  · Follows outpatient with wound care  Patient has an appointment tomorrow-inpatient wound care was called to treat wound in lieu of outpatient visit  · No signs of infection at this time

## 2019-06-30 NOTE — ASSESSMENT & PLAN NOTE
· Patient with venous ulcers left lower extremity, follows with vascular and wound care outpatient  · Currently stable at this time with no signs of infection  Slightly tender to touch  · Wound care will visit patient on 7/3 to change dressing

## 2019-06-30 NOTE — RESPIRATORY THERAPY NOTE
Copd teach completed at bedside pt stated had book but accepted this book  Reviewed zone tool and s&S of copd exacerbation

## 2019-06-30 NOTE — ASSESSMENT & PLAN NOTE
· History noted upon admission  · May be contributory to patient's respiratory issues  · Manage as above for respiratory failure     · Patient receives weekly Zemaira at home (due for it on 7/3)

## 2019-06-30 NOTE — ASSESSMENT & PLAN NOTE
Upon admission, creatinine 1 97  Baseline around 1 2-1 3  Patient with history of recent Lasix use for left lower extremity swelling  · Lasix held  · Avoid nephrotoxic agents including home lasix  · Will have to touch base with vascular about discontinuing lasix as he develops CLAYTON every time he takes this medication     · NS 75 cc/hour  · RESOLVED as of 6/30 with creatinine of 1 08

## 2019-06-30 NOTE — PROGRESS NOTES
Progress Note - Manpreet Metcalf 75 y o  male MRN: [id number]    Unit/Bed#: 2 [place] [address] Encounter: [id number]      Assessment & Plan:    41-year-old male with past medical history as previously noted, initially presents with acute on chronic respiratory failure with hypoxia  Patient is on 3 L nasal cannula oxygen at home, which he is on now as well  He continues to be comfortable at rest, but becomes acutely short of breath with even minimal activity such as ambulating to the bathroom  He offers no other complaints  D-dimer was elevated at 950, however CTA PE study was negative for any acute process  Patient's CLAYTON has completely cleared  Patient will be continued on duo nebs and have his IV steroids weaned as indicated  Though COPD exacerbation is most likely etiology of the patient's symptoms, we have noticed almost no wheezing whatsoever on exam and the etiology of the patient's symptoms lupus at this point  Would appreciate additional recommendations from pulmonology  * Acute on chronic respiratory failure with hypoxia [facility])  Assessment & Plan  Patient complaining of increased shortness of breath upon admission  ABG upon admission pH 7 524, pCO2 27, bicarb 21 3, pO2 98 8   WNL  In ED, patient received Solu-Medrol 125 mg IV, DuoNeb 3 mL neb, weaned off of BiPAP  Patient afebrile and WBC WNL upon admission at 8 36   · Patient on 2 L O2 home oxygen, occasionally up to 3 L as needed  · CXR: Very mild increased reticular interstitial markings in the right upper lobe, possibly representing very early infiltrate  · Duo nebs q 4h and q 2h p r n  · Decrease Solu-Medrol to 20 mg IV q8h, continue to wean  · O2 via nasal cannula, titrate as needed  · Procalcitonin negative, and patient is afebrile without significant leukocytosis - infectious etiology less likely  · D dimer 950, suspicion for PE given prior history however CTA PE study negative for acute process    · At this time the etiology of the patient's respiratory issues is elusive  Likely COPD related, but no wheezing has been heard on exam by several practitioners  Would appreciate further recommendations from pulmonology  CLAYTON (acute kidney injury) (HCC)resolved as of 6/30/2019  Assessment & Plan  Upon admission, creatinine 1 97  Baseline around 1 2-1 3  Patient with history of recent Lasix use for left lower extremity swelling  · Lasix held  · Avoid nephrotoxic agents including home lasix  · Will have to touch base with vascular about discontinuing lasix as he develops CLAYTON every time he takes this medication  · NS 75 cc/hour  · RESOLVED as of 6/30 with creatinine of 1 08    Centrilobular emphysema (HCC)  Assessment & Plan  · History noted upon admission  Likely contributory to patient's chronic respiratory failure  · Management as above  AAT (alpha-1-antitrypsin) deficiency (HCC)  Assessment & Plan  · History noted upon admission  · May be contributory to patient's respiratory issues  · Manage as above for respiratory failure  Gastroesophageal reflux disease  Assessment & Plan  · Continue home medications  · Protonix 40 mg p o  b i d  · Carafate 500 mg p o  b i d  A c  Neuropathy  Assessment & Plan  · Continue home medication gabapentin 300 mg p o  T i d     Essential hypertension  Assessment & Plan  · Continue home medication amlodipine 10 mg p o  daily  · Monitor blood pressures with scheduled vitals    Chronic venous insufficiency  Assessment & Plan  · Patient with venous ulcers left lower extremity, follows with vascular and wound care outpatient  · Currently stable at this time with no signs of infection  BPH (benign prostatic hyperplasia)  Assessment & Plan  · Continue Flomax 0 4 mg p o  daily    Former smoker  Assessment & Plan  · Smoked pack per day or more over 20 years, quit 2 years ago    · Likely cause of patient's COPD/    Insomnia  Assessment & Plan  · Zolpidem 10 mg p o  HS p r n , short-term    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 (HCC)  Assessment & Plan  · Two venous ulcerations left lower extremity lateral and medial aspect distal left lower extremity  · Follows outpatient with wound care  Will consult inpatient wound care  · No signs of infection at this time  Global  · Heplock  · Regular Diet  · Replete electrolytes as indicated  · VTE PPX Lovenox & SCDs  · Full Code Level 1    Subjective:   Patient seen examined bedside  He is a pleasant gentleman who states he feels well and has no complaints, however upon further questioning says that yes he is having some trouble breathing  He states that he is concerned because he cannot ambulate to the bathroom without becoming significantly short of breath and this was not a problem at home prior to coming in  He is currently on 3 L oxygen which is his home O2 requirement  He saturates appropriately with no symptoms while resting  He otherwise has no complaints and no questions for us  Objective:     Vitals: Blood pressure 149/78, pulse 74, temperature 97 9 °F (36 6 °C), temperature source Oral, resp  rate 20, height 6' 5" (1 956 m), weight 85 kg (187 lb 6 3 oz), SpO2 98 %  ,Body mass index is 22 22 kg/m²  Intake/Output Summary (Last 24 hours) at 6/30/2019 1430  Last data filed at 6/30/2019 0553  Gross per 24 hour   Intake 2757 5 ml   Output 950 ml   Net 1807 5 ml       Physical Exam: /78 (BP Location: Right arm)   Pulse 74   Temp 97 9 °F (36 6 °C) (Oral)   Resp 20   Ht 6' 5" (1 956 m)   Wt 85 kg (187 lb 6 3 oz)   SpO2 98%   BMI 22 22 kg/m²   General appearance: alert and oriented, in no acute distress and cooperative  Lungs: Diminished breath sounds bilaterally, but otherwise clear   No wheezes rales or rhonchi  Heart: regular rate and rhythm, S1, S2 normal, no murmur, click, rub or gallop  Abdomen: soft, non-tender; bowel sounds normal; no masses,  no organomegaly  Extremities: extremities normal, warm and well-perfused; no cyanosis, clubbing, or edema  Pulses: 2+ and symmetric  Neurologic: Grossly normal     Invasive Devices     Peripheral Intravenous Line            Peripheral IV 06/28/19 Left Forearm 1 day                Lab, Imaging and other studies: I have personally reviewed pertinent reports  VTE Pharmacologic Prophylaxis: Enoxaparin (Lovenox)  VTE Mechanical Prophylaxis: sequential compression device     Michell Dania, DO  06/30/19  2:30 PM    Some portions of this record may have been generated with voice recognition software  There may be translation, syntax, or grammatical errors  Occasional wrong word or "sound-a-like" substitutions may have occurred due to the inherent limitations of the voice recognition software  Read the chart carefully and recognize, using context, where substations may have occurred   If you have any questions, please contact the dictating provider for clarification or correction, as needed

## 2019-06-30 NOTE — ASSESSMENT & PLAN NOTE
Patient continues to complain of SOB  Slightly more tachypneic from baseline with ambulation to bathroom and back to bed  Brigette Miller He typically can walk around his house, go grocery shopping, take walks, etc, with breaks and mild SOB  He feels better in the past few days and would like to go home  · Procalcitonin negative, pt remains afebrile, WBC WNL  · D dimer 950, suspicion for PE given prior history  Anticoagulation therapy with Dr Jovita Lopez completed prior to admission  · CTA PE study negative for acute process, but did show emphysematous bullae in bases      · CXR: Very mild increased reticular interstitial markings in the right upper lobe, possibly representing very early infiltrate  Repeat CXR showed Discoid atelectasis versus scarring right upper lobe  Similar to findings in the past   · ECHO showed EF of 35%, grade 1 diastolic dysfunction, peak PA pressure 51 mmHG  · Patient on 3 L O2 home oxygen   Currently on 3 L NC-will titrate as needed  · Duo nebs q4h daily and q2h prn and breo ellitpa  · Currently giving Solu-Medrol 20 mg IV q8h, changed to q12 on 7/3  ·  Continue to monitor clinically and follow pulmonology recommendations  · Pulm outpatient recommendations: complete pulmonary function testing and further workup for cause of elevated dyspnea; continueProventil, Trelegy, duo nebulizers,

## 2019-07-01 ENCOUNTER — APPOINTMENT (INPATIENT)
Dept: NON INVASIVE DIAGNOSTICS | Facility: HOSPITAL | Age: 74
DRG: 189 | End: 2019-07-01
Payer: MEDICARE

## 2019-07-01 LAB
ANION GAP SERPL CALCULATED.3IONS-SCNC: 9 MMOL/L (ref 4–13)
ARTERIAL PATENCY WRIST A: YES
BASE EXCESS BLDA CALC-SCNC: 0.3 MMOL/L
BASOPHILS # BLD AUTO: 0.01 THOUSANDS/ΜL (ref 0–0.1)
BASOPHILS NFR BLD AUTO: 0 % (ref 0–1)
BODY TEMPERATURE: 98.2 DEGREES FEHRENHEIT
BUN SERPL-MCNC: 16 MG/DL (ref 5–25)
CALCIUM SERPL-MCNC: 8.8 MG/DL (ref 8.3–10.1)
CHLORIDE SERPL-SCNC: 105 MMOL/L (ref 100–108)
CO2 SERPL-SCNC: 25 MMOL/L (ref 21–32)
CREAT SERPL-MCNC: 1.01 MG/DL (ref 0.6–1.3)
EOSINOPHIL # BLD AUTO: 0 THOUSAND/ΜL (ref 0–0.61)
EOSINOPHIL NFR BLD AUTO: 0 % (ref 0–6)
ERYTHROCYTE [DISTWIDTH] IN BLOOD BY AUTOMATED COUNT: 13.1 % (ref 11.6–15.1)
GFR SERPL CREATININE-BSD FRML MDRD: 72 ML/MIN/1.73SQ M
GLUCOSE SERPL-MCNC: 123 MG/DL (ref 65–140)
HCO3 BLDA-SCNC: 23.4 MMOL/L (ref 22–28)
HCT VFR BLD AUTO: 36.4 % (ref 36.5–49.3)
HGB BLD-MCNC: 12.1 G/DL (ref 12–17)
IMM GRANULOCYTES # BLD AUTO: 0.12 THOUSAND/UL (ref 0–0.2)
IMM GRANULOCYTES NFR BLD AUTO: 1 % (ref 0–2)
LYMPHOCYTES # BLD AUTO: 0.83 THOUSANDS/ΜL (ref 0.6–4.47)
LYMPHOCYTES NFR BLD AUTO: 9 % (ref 14–44)
MAGNESIUM SERPL-MCNC: 2 MG/DL (ref 1.6–2.6)
MCH RBC QN AUTO: 32.2 PG (ref 26.8–34.3)
MCHC RBC AUTO-ENTMCNC: 33.2 G/DL (ref 31.4–37.4)
MCV RBC AUTO: 97 FL (ref 82–98)
MONOCYTES # BLD AUTO: 0.44 THOUSAND/ΜL (ref 0.17–1.22)
MONOCYTES NFR BLD AUTO: 5 % (ref 4–12)
NASAL CANNULA: ABNORMAL
NEUTROPHILS # BLD AUTO: 7.65 THOUSANDS/ΜL (ref 1.85–7.62)
NEUTS SEG NFR BLD AUTO: 85 % (ref 43–75)
NRBC BLD AUTO-RTO: 0 /100 WBCS
O2 CT BLDA-SCNC: 18.1 ML/DL (ref 16–23)
OXYHGB MFR BLDA: 96.9 % (ref 94–97)
PCO2 BLDA: 32.9 MM HG (ref 36–44)
PCO2 TEMP ADJ BLDA: 32.6 MM HG (ref 36–44)
PH BLD: 7.47 [PH] (ref 7.35–7.45)
PH BLDA: 7.47 [PH] (ref 7.35–7.45)
PHOSPHATE SERPL-MCNC: 2.7 MG/DL (ref 2.3–4.1)
PLATELET # BLD AUTO: 182 THOUSANDS/UL (ref 149–390)
PMV BLD AUTO: 9.4 FL (ref 8.9–12.7)
PO2 BLD: 89.5 MM HG (ref 75–129)
PO2 BLDA: 90.6 MM HG (ref 75–129)
POTASSIUM SERPL-SCNC: 4.1 MMOL/L (ref 3.5–5.3)
RBC # BLD AUTO: 3.76 MILLION/UL (ref 3.88–5.62)
SODIUM SERPL-SCNC: 139 MMOL/L (ref 136–145)
SPECIMEN SOURCE: ABNORMAL
WBC # BLD AUTO: 9.05 THOUSAND/UL (ref 4.31–10.16)

## 2019-07-01 PROCEDURE — 36600 WITHDRAWAL OF ARTERIAL BLOOD: CPT

## 2019-07-01 PROCEDURE — 85025 COMPLETE CBC W/AUTO DIFF WBC: CPT | Performed by: STUDENT IN AN ORGANIZED HEALTH CARE EDUCATION/TRAINING PROGRAM

## 2019-07-01 PROCEDURE — 82805 BLOOD GASES W/O2 SATURATION: CPT | Performed by: PHYSICIAN ASSISTANT

## 2019-07-01 PROCEDURE — 97530 THERAPEUTIC ACTIVITIES: CPT

## 2019-07-01 PROCEDURE — 84100 ASSAY OF PHOSPHORUS: CPT | Performed by: STUDENT IN AN ORGANIZED HEALTH CARE EDUCATION/TRAINING PROGRAM

## 2019-07-01 PROCEDURE — 80048 BASIC METABOLIC PNL TOTAL CA: CPT | Performed by: STUDENT IN AN ORGANIZED HEALTH CARE EDUCATION/TRAINING PROGRAM

## 2019-07-01 PROCEDURE — 94760 N-INVAS EAR/PLS OXIMETRY 1: CPT

## 2019-07-01 PROCEDURE — 94640 AIRWAY INHALATION TREATMENT: CPT

## 2019-07-01 PROCEDURE — 93306 TTE W/DOPPLER COMPLETE: CPT

## 2019-07-01 PROCEDURE — NC001 PR NO CHARGE: Performed by: FAMILY MEDICINE

## 2019-07-01 PROCEDURE — 83735 ASSAY OF MAGNESIUM: CPT | Performed by: STUDENT IN AN ORGANIZED HEALTH CARE EDUCATION/TRAINING PROGRAM

## 2019-07-01 PROCEDURE — 99232 SBSQ HOSP IP/OBS MODERATE 35: CPT | Performed by: INTERNAL MEDICINE

## 2019-07-01 RX ORDER — VALACYCLOVIR HYDROCHLORIDE 500 MG/1
1500 TABLET, FILM COATED ORAL ONCE
Status: COMPLETED | OUTPATIENT
Start: 2019-07-01 | End: 2019-07-01

## 2019-07-01 RX ORDER — FLUTICASONE FUROATE AND VILANTEROL 200; 25 UG/1; UG/1
1 POWDER RESPIRATORY (INHALATION) DAILY
Status: DISCONTINUED | OUTPATIENT
Start: 2019-07-01 | End: 2019-07-03 | Stop reason: HOSPADM

## 2019-07-01 RX ORDER — FUROSEMIDE 20 MG/1
20 TABLET ORAL DAILY
Status: DISCONTINUED | OUTPATIENT
Start: 2019-07-02 | End: 2019-07-02

## 2019-07-01 RX ADMIN — SUCRALFATE 500 MG: 1 SUSPENSION ORAL at 06:22

## 2019-07-01 RX ADMIN — IPRATROPIUM BROMIDE AND ALBUTEROL SULFATE 3 ML: 2.5; .5 SOLUTION RESPIRATORY (INHALATION) at 23:34

## 2019-07-01 RX ADMIN — IPRATROPIUM BROMIDE AND ALBUTEROL SULFATE 3 ML: 2.5; .5 SOLUTION RESPIRATORY (INHALATION) at 00:24

## 2019-07-01 RX ADMIN — METHYLPREDNISOLONE SODIUM SUCCINATE 20 MG: 40 INJECTION, POWDER, FOR SOLUTION INTRAMUSCULAR; INTRAVENOUS at 05:23

## 2019-07-01 RX ADMIN — PANTOPRAZOLE SODIUM 40 MG: 40 TABLET, DELAYED RELEASE ORAL at 09:59

## 2019-07-01 RX ADMIN — GABAPENTIN 300 MG: 300 CAPSULE ORAL at 20:42

## 2019-07-01 RX ADMIN — IPRATROPIUM BROMIDE AND ALBUTEROL SULFATE 3 ML: 2.5; .5 SOLUTION RESPIRATORY (INHALATION) at 07:34

## 2019-07-01 RX ADMIN — IPRATROPIUM BROMIDE AND ALBUTEROL SULFATE 3 ML: 2.5; .5 SOLUTION RESPIRATORY (INHALATION) at 19:30

## 2019-07-01 RX ADMIN — GABAPENTIN 300 MG: 300 CAPSULE ORAL at 10:00

## 2019-07-01 RX ADMIN — TAMSULOSIN HYDROCHLORIDE 0.4 MG: 0.4 CAPSULE ORAL at 09:59

## 2019-07-01 RX ADMIN — PANTOPRAZOLE SODIUM 40 MG: 40 TABLET, DELAYED RELEASE ORAL at 18:13

## 2019-07-01 RX ADMIN — METHYLPREDNISOLONE SODIUM SUCCINATE 20 MG: 40 INJECTION, POWDER, FOR SOLUTION INTRAMUSCULAR; INTRAVENOUS at 21:16

## 2019-07-01 RX ADMIN — IPRATROPIUM BROMIDE AND ALBUTEROL SULFATE 3 ML: 2.5; .5 SOLUTION RESPIRATORY (INHALATION) at 03:22

## 2019-07-01 RX ADMIN — METHYLPREDNISOLONE SODIUM SUCCINATE 20 MG: 40 INJECTION, POWDER, FOR SOLUTION INTRAMUSCULAR; INTRAVENOUS at 15:28

## 2019-07-01 RX ADMIN — GABAPENTIN 300 MG: 300 CAPSULE ORAL at 15:31

## 2019-07-01 RX ADMIN — ENOXAPARIN SODIUM 40 MG: 40 INJECTION SUBCUTANEOUS at 09:59

## 2019-07-01 RX ADMIN — IPRATROPIUM BROMIDE AND ALBUTEROL SULFATE 3 ML: 2.5; .5 SOLUTION RESPIRATORY (INHALATION) at 11:34

## 2019-07-01 RX ADMIN — FLUTICASONE FUROATE AND VILANTEROL TRIFENATATE 1 PUFF: 200; 25 POWDER RESPIRATORY (INHALATION) at 10:00

## 2019-07-01 RX ADMIN — SUCRALFATE 500 MG: 1 SUSPENSION ORAL at 15:28

## 2019-07-01 RX ADMIN — AMLODIPINE BESYLATE 10 MG: 10 TABLET ORAL at 10:00

## 2019-07-01 RX ADMIN — FINASTERIDE 5 MG: 5 TABLET, FILM COATED ORAL at 09:59

## 2019-07-01 RX ADMIN — VALACYCLOVIR HYDROCHLORIDE 1500 MG: 500 TABLET, FILM COATED ORAL at 20:41

## 2019-07-01 RX ADMIN — IPRATROPIUM BROMIDE AND ALBUTEROL SULFATE 3 ML: 2.5; .5 SOLUTION RESPIRATORY (INHALATION) at 15:46

## 2019-07-01 RX ADMIN — ZOLPIDEM TARTRATE 10 MG: 5 TABLET, COATED ORAL at 23:45

## 2019-07-01 NOTE — OCCUPATIONAL THERAPY NOTE
OT TREATMENT       07/01/19 1525   Restrictions/Precautions   Other Precautions Contact/isolation;O2;Fall Risk  (Easily SOB)   General   Family/Caregiver Present No   Pain Assessment   Pain Assessment No/denies pain   Pain Score No Pain   Bed Mobility   Supine to Sit 5  Supervision   Sit to Supine 5  Supervision   Transfers   Sit to Stand 4  Minimal assistance   Additional items Assist x 1;Verbal cues  (SPC, bed elevated, verbal cues for transfer technique)   Stand to Sit 4  Minimal assistance   Additional items Assist x 1;Verbal cues; Bed elevated  (SPC, verbal cues for safe transfer technique)   Functional Mobility   Functional Mobility 4  Minimal assistance   Additional Comments 50 feet in hallway with SPC on 3 L O2   Additional items SPC   Right Upper Extremity- Strength   R Shoulder Flexion; Horizontal ABduction   R Elbow Elbow flexion;Elbow extension  (Chest press and bicep curls)   R Position Seated  (EOB)   Equipment Dumbbell  (1 lb)   R Weight/Reps/Sets 2 sets of 10 each exercise   RUE Strength Comment Pt tolerated well, short of breath throughout but spO2 remaining at 95-96%  Pt will conitnue to benefit from low weight, high repetition exercise to improve endurance  Left Upper Extremity-Strength   L Shoulder Flexion; Horizontal ABduction   L Elbow Elbow flexion;Elbow extension  (Chest press and bicep curl)   L Position Seated  (EOB)   Equipment Dumbell  (1 lb)   L Weights/Reps/Sets 2 sets of 10 each exercise   LUE Strength Comment Pt tolerated well, short of breath throughout but spO2 remaining at 95-96%  Pt will conitnue to benefit from low weight, high repetition exercise to improve endurance  Cognition   Overall Cognitive Status WFL   Activity Tolerance   Activity Tolerance Patient tolerated treatment well  (Short of breath throughout but spO2 remaining at 95-96%)   Assessment   Assessment Pt willing to participate in therapy  Pt short of breath throughout session but spO2 remaining at 95-96%   Pt educated on diaphragmatic breathing technique throughout session and will continue to benefit from practice with technique  Pt will continue to benefit from skilled occupational therapy services to increase independence and safety with ADLs and functional mobility  Plan   Treatment Interventions Functional transfer training;UE strengthening/ROM; Endurance training;Energy conservation; Activityengagement   Recommendation   OT Discharge Recommendation Home OT   Licensure   [place] License Number  [place], [place] [id number]

## 2019-07-01 NOTE — PROGRESS NOTES
Progress Note - Pulmonary   Coleen Valera 75 y o  male MRN: [id number]  Unit/Bed#: 2 [place] [address] Encounter: [id number]    Assessment:  Dyspnea due to acute exacerbation of severe COPD  Still having shortness of breath  Severe emphysema with FEV1 of 1 6 L or 40% of predicted  Chronic hypoxemic respiratory failure  Alpha-1 antitrypsin deficiency  Acute kidney injury has improved  No evidence of recurrent pulmonary embolism  There is a very small amount of chronic clot in the distal left pulmonary artery    Plan:  Solu-Medrol dose has been decreased to 20 mg IV every 8 hours  Would continue this dose for now  Neb treatments with DuoNeb every 4 hours  Patient does use oxygen 3 liters/minute at home  Patient is due for his weekly dose of IV alpha-1 antitrypsin protein on Wednesday July 3rd  Subjective:   Patient still gets short of breath with just walking or doing other exertional activity  Objective:     Vitals: Blood pressure 147/81, pulse 82, temperature 98 2 °F (36 8 °C), temperature source Oral, resp  rate 18, height 6' 5" (1 956 m), weight 85 kg (187 lb 6 3 oz), SpO2 95 %  ,Body mass index is 22 22 kg/m²  Intake/Output Summary (Last 24 hours) at 6/30/2019 2140  Last data filed at 6/30/2019 0553  Gross per 24 hour   Intake 727 5 ml   Output 750 ml   Net -22 5 ml       Physical Exam: Physical Exam   Constitutional: He is oriented to person, place, and time  He appears well-developed and well-nourished  No distress  On 3 L O2 saturation is 96%   HENT:   Head: Normocephalic  Nose: Nose normal    Mouth/Throat: Oropharynx is clear and moist  No oropharyngeal exudate  Eyes: Pupils are equal, round, and reactive to light  Conjunctivae are normal    Neck: Neck supple  No JVD present  No tracheal deviation present  Cardiovascular: Normal rate, regular rhythm and normal heart sounds  Pulmonary/Chest: Effort normal    Lung sounds are diminished but clear   Abdominal: Soft  He exhibits no distension  There is no tenderness  There is no guarding  Musculoskeletal:   Mild edema lower extremities   Lymphadenopathy:     He has no cervical adenopathy  Neurological: He is alert and oriented to person, place, and time  Skin: Skin is warm and dry  No rash noted  Psychiatric: He has a normal mood and affect  His behavior is normal  Thought content normal         Labs: I have personally reviewed pertinent lab results  , ABG:   Lab Results   Component Value Date    PHART 7 524 (H) 06/28/2019    NIO5XMJ 26 5 (LL) 06/28/2019    PO2ART 98 8 06/28/2019    NQM5CXL 21 3 (L) 06/28/2019    BEART 0 0 06/28/2019    SOURCE Radial, Right 06/28/2019   , BNP: No results found for: BNP, CBC:   Lab Results   Component Value Date    WBC 11 54 (H) 06/30/2019    HGB 13 0 06/30/2019    HCT 38 8 06/30/2019    MCV 96 06/30/2019     06/30/2019    MCH 32 3 06/30/2019    MCHC 33 5 06/30/2019    RDW 13 3 06/30/2019    MPV 9 5 06/30/2019    NRBC 0 06/30/2019   , CMP:   Lab Results   Component Value Date     09/27/2016    K 4 1 06/30/2019    K 4 2 09/27/2016     06/30/2019     09/27/2016    CO2 23 06/30/2019    CO2 23 09/27/2016    BUN 16 06/30/2019    BUN 14 09/27/2016    CREATININE 1 08 06/30/2019    CREATININE 1 09 09/27/2016    GLUCOSE 89 09/27/2016    CALCIUM 9 0 06/30/2019    CALCIUM 9 3 09/27/2016    AST 23 06/28/2019    AST 18 09/27/2016    ALT 33 06/28/2019    ALT 16 09/27/2016    ALKPHOS 82 06/28/2019    ALKPHOS 57 09/27/2016    PROT 6 9 09/27/2016    BILITOT 1 0 09/27/2016    EGFR 67 06/30/2019   , PT/INR:   Lab Results   Component Value Date    INR 1 01 06/28/2019   , Troponin: No results found for: TROPONIN    Imaging and other studies: I have personally reviewed pertinent reports     and I have personally reviewed pertinent films in PACS

## 2019-07-01 NOTE — PLAN OF CARE
Problem: OCCUPATIONAL THERAPY ADULT  Goal: Performs self-care activities at highest level of function for planned discharge setting  See evaluation for individualized goals  Outcome: Progressing  Note:   Limitation: Decreased endurance, Decreased ADL status, Decreased self-care trans, Decreased high-level ADLs(SOB)  Prognosis: Good  Assessment: Pt willing to participate in therapy  Pt short of breath throughout session but spO2 remaining at 95-96%  Pt educated on diaphragmatic breathing technique throughout session and will continue to benefit from practice with technique  Pt will continue to benefit from skilled occupational therapy services to increase independence and safety with ADLs and functional mobility       OT Discharge Recommendation: Home OT

## 2019-07-01 NOTE — PROGRESS NOTES
Progress Note - Pulmonary   Kyle Alex 75 y o  male MRN: [id number]  Unit/Bed#: 2 [place] [address] Encounter: [id number]    28-year-old male with a history of severe emphysematous COPD with alpha 1 antitrypsin deficiency with replacements at home weekly on Wednesdays, with an FEV1 of 1 6 L or 40% of predicted in March 2018, history of remote pulmonary embolus treated with anticoagulation for 6 months presented to the emergency department with acute on chronic dyspnea ongoing for approximately 2 weeks and acutely worsening of Friday  In-hospital being treated for acute exacerbation of COPD  CT PE study was negative for acute PE has chronic left-sided small pulmonary embolus  Also had bilateral lower extremity swelling ongoing for approximately 6 weeks and left lower extremity ulcers  Venous duplex performed and negative  Assessment/Plan:     Dyspnea secondary to acute exacerbation severe emphysematous COPD with alpha 1 antitrypsin deficiency  FEV1 1 6 L or 40% of predicted March 2018:  -recommend outpatient complete pulmonary function test  -continue methylprednisolone 20 mg every 8 hours  -continue DuoNeb therapy  -uses Proventil, Trelegy, duo nebulizers in the outpatient setting  -can subset 2 to [place] for Trelegy for the inpatient setting only, not to be continued in the outpatient setting, will resume Trelegy when discharged  Chronic hypoxemic respiratory failure with oxygen dependence 3 L at home:  -current oxygen saturations are 98% on 3 L  -will evaluate for further desaturation with ambulation  -not an official home evaluation study  History of pulmonary embolus:  -previously treated on 6 months anticoagulation therapy  -follows up with Jason Carlson of Hematology-Oncology  -no evidence of acute PE on CT scan; has small chronic pulmonary embolus  CLAYTON:  -resolved          ______________________________________________________________________    Subjective: Pt seen and examined at bedside    Remained significantly dyspneic  Tele Events:     Vitals:   Temp:  [98 1 °F (36 7 °C)-98 2 °F (36 8 °C)] 98 2 °F (36 8 °C)  HR:  [69-82] 76  Resp:  [18-20] 18  BP: (143-152)/(69-94) 152/94  Weight (last 2 days)     None        Oxygen Therapy  SpO2: 96 %  O2 Flow Rate (L/min): 3 L/min    IV Infusions:       Nutrition:        Diet Orders   (From admission, onward)            Start     Ordered    06/30/19 0831  Room Service  Once     Question:  Type of Service  Answer:  Room Service-Appropriate    06/30/19 0830    06/28/19 2235  Diet Regular; Regular House  Diet effective now     Question Answer Comment   Diet Type Regular    Regular Regular House    RD to adjust diet per protocol? Yes        06/28/19 2234          Ins/Outs:   I/O       06/29 0701 - 06/30 0700 06/30 0701 - 07/01 0700 07/01 0701 - 07/02 0700    P  O  840 480     I V  (mL/kg) 2317 5 (27 3)      Total Intake(mL/kg) 3157 5 (37 1) 480 (5 6)     Urine (mL/kg/hr) 950 (0 5) 800 (0 4)     Total Output 950 800     Net +2207 5 -320            Unmeasured Emesis Occurrence 1 x            Lines/Drains:  Invasive Devices     Peripheral Intravenous Line            Peripheral IV 06/28/19 Left Forearm 2 days                 Active medications:  Scheduled Meds:  Current Facility-Administered Medications:  amLODIPine 10 mg Oral Daily Casey Anderson MD   enoxaparin 40 mg Subcutaneous Q24H [address] Sharyle Barbone, DO   finasteride 5 mg Oral QAM Casey Anderson MD   gabapentin 300 mg Oral TID Casey Anderson MD   ipratropium-albuterol 3 mL Nebulization Q4H Casey Anderson MD   ipratropium-albuterol 3 mL Nebulization Q2H PRN Casey Anderson MD   methylPREDNISolone sodium succinate 20 mg Intravenous Q8H [address] Burton Goodson DO   ondansetron 4 mg Intravenous Q6H PRN Rochelle Loving MD   pantoprazole 40 mg Oral BID Casey Anderson MD   sucralfate 500 mg Oral BID AC Casey Anderson MD   tamsulosin 0 4 mg Oral Daily Casey Anderson MD   zolpidem 10 mg Oral HS PRN Casey Anderson MD     PRN Meds:  ipratropium-albuterol 3 mL Q2H PRN   ondansetron 4 mg Q6H PRN   zolpidem 10 mg HS PRN     ____________________________________________________________________      Physical Exam   Constitutional: He is oriented to person, place, and time  He appears well-developed  HENT:   Head: Normocephalic and atraumatic  Eyes: Pupils are equal, round, and reactive to light  Conjunctivae are normal    Neck: Normal range of motion  Neck supple  Cardiovascular: Normal rate, regular rhythm and normal heart sounds  Exam reveals no gallop and no friction rub  No murmur heard  Pulmonary/Chest: Effort normal  No accessory muscle usage  Tachypnea noted  No respiratory distress  He has decreased breath sounds in the right upper field, the right middle field, the right lower field, the left upper field, the left middle field and the left lower field  He has no wheezes  He has no rhonchi  He has no rales  He exhibits no tenderness  Mild to moderately decreased breath sounds at baseline    Current oxygen saturations 98% on 3 L nasal cannula    Patient is moderately tachypneic with conversational dyspnea       Abdominal: Soft  Bowel sounds are normal    Musculoskeletal: Normal range of motion  Left lower leg: He exhibits swelling and edema  Positive left lower extremity swelling and edema    Ankle wrapped in clean dressing  Neurological: He is alert and oriented to person, place, and time  Skin: Skin is warm and dry     Psychiatric: He has a normal mood and affect            ____________________________________________________________________    Labs:   CBC: Results from last 7 days   Lab Units 07/01/19  0510 06/30/19  0647 06/29/19  0459   WBC Thousand/uL 9 05 11 54* 7 65   HEMOGLOBIN g/dL 12 1 13 0 13 3   HEMATOCRIT % 36 4* 38 8 39 0   MCV fL 97 96 95   PLATELETS Thousands/uL 182 194 186     CMP: Results from last 7 days   Lab Units 07/01/19  0510 06/30/19  0647 06/29/19  0459 06/28/19  1732   POTASSIUM mmol/L 4 1 4 1 4 0 3 9   CHLORIDE mmol/L 105 107 103 103   CO2 mmol/L 25 23 21 23   BUN mg/dL 16 16 20 17   CREATININE mg/dL 1 01 1 08 1 61* 1 97*   CALCIUM mg/dL 8 8 9 0 8 9 9 0   AST U/L  --   --   --  23   ALT U/L  --   --   --  33   ALK PHOS U/L  --   --   --  82   EGFR ml/min/1 73sq m 72 67 41 32     No components found for: ABG    Magnesium:   Results from last 7 days   Lab Units 07/01/19  0510   MAGNESIUM mg/dL 2 0     Phosphorous:   Results from last 7 days   Lab Units 07/01/19  0510   PHOSPHORUS mg/dL 2 7     Troponin:   Results from last 7 days   Lab Units 06/28/19  1732   TROPONIN I ng/mL <0 02     PT/INR:   Results from last 7 days   Lab Units 06/28/19  1732   PTT seconds 25   INR  1 01     Lactic Acid:     BNP:   Results from last 7 days   Lab Units 06/28/19  1732   NT-PRO BNP pg/mL 182     TSH:     Procalcitonin: Invalid input(s): PROCALCITONIN      Imaging:   CTA chest pe study   Final Result by Esthela Comer DO (06/30 0854)   1  No acute pulmonary emboli  2   Stable soft tissue in the periphery of the bifurcation of the left lower lobe pulmonary artery vessels  This appears extrinsic to the vessel proper  Although the findings may represent chronic PE, confluent adenopathy in the left hilum not    excluded  As stated, this is unchanged from the prior study   3  Stable emphysematous changes  4   Hiatal hernia with reflux into the distal esophagus and thickening of the distal esophagus  Recommend follow-up upper endoscopy  Workstation performed: [id number]         XR chest 1 view portable   Final Result by Yamel Drummond MD (06/29 8397)      Mild relative increased reticular interstitial markings in the right upper lobe  Workstation performed: [id number]           [facility] REPORT  CLINICAL:  Indications:  Patient presents with history of left lower extremity varicose veins  Patient  reports having pain   Patient denies wearing therapeutic compressions stockings  at this time due to nonhealing wound  Operative History:  1980 Bilateral great saphenous vein ligation  Risk Factors: The patient has history of HTN and previous smoking (quit >10yrs ago)  FINDINGS:     Left       Reflux Time    CFV            2 55321    PFV            0 64616    FV Prox        0 80357    FV Mid         0 94232    FV Dist        0 17348    Popliteal      1 89829             CONCLUSION:  Impression:  LEFT LIMB:  Deep venous incompetence is noted  The great saphenous vein was removed and was not visualized  The anterior accessory saphenous vein and varicose veins below the knee are  incompetent  The anterior accessory saphenous vein does not remain within the saphenous  compartment in the thigh  The small saphenous vein is competent and does not communicate with the  popliteal vein  There is no evidence of incompetent perforators in the thigh or calf  There is no evidence of deep vein thrombosis in the CFV, the proximal PFV, the  femoral vein and the popliteal vein  Study performed with patient in the steep Reverse Trendelenburg position  SIGNATURE:  Electronically Signed by: Patrick Corey on 2019-06-24 05:25:44 PM    Micro: Lab Results   Component Value Date    BLOODCX No Growth After 5 Days  12/29/2017    BLOODCX No Growth After 5 Days   12/29/2017    WOUNDCULT 3+ Growth of Staphylococcus aureus (A) 06/12/2019    MRSACULTURE  06/28/2019     No Methicillin Resistant Staphlyococcus aureus (MRSA) isolated            Invalid input(s): LEGIONELLAURINARYANTIGEN        Code Status: Level 1 - Full Code        The I had probably like you are a do not

## 2019-07-01 NOTE — PROGRESS NOTES
Progress Note - Yves Ramirez 75 y o  male MRN: [id number]    Unit/Bed#: 2 [place] [address] Encounter: [id number]      Assessment and Plan:  Patient is a 75year old male with history of COPD, alpha 1 enzyme deficiency, emphysema, BPH, HTN who presented 3 days ago with increased SOB on exertion and CLAYTON  CLAYTON has since resolved  He was treated with an initial does of solumedrol 125 mg IV and has since been tapered to 20 mg  Q8  Patient is also undergoing duoneb treatment  Other home medications continued as noted below  Pulmonology is consulting  * Acute on chronic respiratory failure with hypoxia [facility])  Assessment & Plan  Patient continues to complain of SOB  Markedly tachypneic with ambulation to bathroom and back to bed  · Procalcitonin negative, pt remains afebrile  · D dimer 950, suspicion for PE given prior history however CTA PE study negative for acute process  Anticoagulation therapy with Dr Farah Ruskin completed prior to admission  · CXR: Very mild increased reticular interstitial markings in the right upper lobe, possibly representing very early infiltrate  · Patient on 3 L O2 home oxygen  Currently on 3 L NC-will titrate as needed  · Duo nebs q4h daily and q2h prn  · Currently giving Solu-Medrol 20 mg IV q8h  ·  Continue to monitor clinically and follow pulmonology recommendations  · Pulm outpatient recommendations: complete pulmonary function testing; continueProventil, Trelegy, duo nebulizers       AAT (alpha-1-antitrypsin) deficiency ([facility] [address] )  Assessment & Plan  · History noted upon admission  · May be contributory to patient's respiratory issues  · Manage as above for respiratory failure  Insomnia  Assessment & Plan  · Zolpidem 10 mg p o  HS p r n , short-term    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 (HCC)  Assessment & Plan  · Two venous ulcerations left lower extremity lateral and medial aspect distal left lower extremity  · Follows outpatient with wound care    · No signs of infection at this time  Chronic venous insufficiency  Assessment & Plan  · Patient with venous ulcers left lower extremity, follows with vascular and wound care outpatient  · Currently stable at this time with no signs of infection  Neuropathy  Assessment & Plan  · Continue home medication gabapentin 300 mg p o  T i d  Centrilobular emphysema ([address] )  Assessment & Plan  · History noted upon admission  Likely contributory to patient's chronic respiratory failure  · Management as above  · Patient taking home Breo Ellipta daily during hospital stay  · Patient follows outpatient with Lennox Litter    Former smoker  Assessment & Plan  · Smoked pack per day or more over 20 years, quit 2 years ago  · Likely cause of patient's COPD/    BPH (benign prostatic hyperplasia)  Assessment & Plan  · Continue Flomax 0 4 mg p o  daily    Essential hypertension  Assessment & Plan  · Continue home medication amlodipine 10 mg p o  daily  · Monitor blood pressures with scheduled vitals    Gastroesophageal reflux disease  Assessment & Plan  · Continue home medications  · Protonix 40 mg p o  b i d  · Carafate 500 mg p o  b i d  A c  CLAYTON (acute kidney injury) (HCC)resolved as of 6/30/2019  Assessment & Plan  Upon admission, creatinine 1 97  Baseline around 1 2-1 3  Patient with history of recent Lasix use for left lower extremity swelling  · Lasix held  · Avoid nephrotoxic agents including home lasix  · Will have to touch base with vascular about discontinuing lasix as he develops CLAYTON every time he takes this medication  · NS 75 cc/hour  · RESOLVED as of 6/30 with creatinine of 1 08    Global  -Zofran 4 mg PRN for occasional nausea  -Regular diet    Subjective:   Patient states that his breathing is OK while in bed, but that he feels very out of breath walking to the bathroom and back to bed again  He occasionally feels some nausea      Objective:     Vitals: Blood pressure 152/94, pulse 76, temperature 98 2 °F (36 8 °C), temperature source Oral, resp  rate 18, height 6' 5" (1 956 m), weight 85 kg (187 lb 6 3 oz), SpO2 97 %  ,Body mass index is 22 22 kg/m²  Intake/Output Summary (Last 24 hours) at 7/1/2019 1147  Last data filed at 7/1/2019 0601  Gross per 24 hour   Intake 480 ml   Output 800 ml   Net -320 ml       Physical Exam: /94 (BP Location: Left arm)   Pulse 76   Temp 98 2 °F (36 8 °C) (Oral)   Resp 18   Ht 6' 5" (1 956 m)   Wt 85 kg (187 lb 6 3 oz)   SpO2 97%   BMI 22 22 kg/m²     General: Pt is AAO x 3, not in any acute distress  Cardio: RRR, S1 and S2 +, no murmurs/rubs/gallops/clicks  Resp: CTA b/l, no wheezes/rales/rhonchi  Abdomen: soft, NT/ND, BS+  Extremities: ulcers noted bilaterally                                                                                 Invasive Devices     Peripheral Intravenous Line            Peripheral IV 06/28/19 Left Forearm 2 days                Lab, Imaging and other studies: I have personally reviewed pertinent reports      VTE Pharmacologic Prophylaxis: Enoxaparin (Lovenox)  VTE Mechanical Prophylaxis: reason for no mechanical VTE prophylaxis giving lovenox

## 2019-07-01 NOTE — SOCIAL WORK
Spoke with the pt at the bedside  Pt has been recommended for VNA services for PT/OT  Discussed pt options, declined provider list, pt agreeable to [facility] services  Was not happy with is prior service with [address] [place] [address]  Referral placed through [facility]

## 2019-07-01 NOTE — PLAN OF CARE
Problem: PAIN - ADULT  Goal: Verbalizes/displays adequate comfort level or baseline comfort level  Description  Interventions:  - Encourage patient to monitor pain and request assistance  - Assess pain using appropriate pain scale  - Administer analgesics based on type and severity of pain and evaluate response  - Implement non-pharmacological measures as appropriate and evaluate response  - Consider cultural and social influences on pain and pain management  - Notify physician/advanced practitioner if interventions unsuccessful or patient reports new pain  Outcome: Progressing     Problem: INFECTION - ADULT  Goal: Absence or prevention of progression during hospitalization  Description  INTERVENTIONS:  - Assess and monitor for signs and symptoms of infection  - Monitor lab/diagnostic results  - Monitor all insertion sites, IV  - [place] appropriate cooling/warming therapies per order  - Administer medications as ordered  - Instruct and encourage patient and family to use good hand hygiene technique  - Identify and instruct in appropriate isolation precautions for identified infection/condition   Outcome: Progressing     Problem: SAFETY ADULT  Goal: Patient will remain free of falls  Description  INTERVENTIONS:  - Assess patient frequently for physical needs  -  Identify cognitive and physical deficits and behaviors that affect risk of falls    -  [place] fall precautions as indicated by assessment   - Educate patient/family on patient safety including physical limitations  - Instruct patient to call for assistance with activity based on assessment  - Modify environment to reduce risk of injury  - Consider OT/PT consult to assist with strengthening/mobility  Outcome: Progressing     Problem: DISCHARGE PLANNING  Goal: Discharge to home or other facility with appropriate resources  Description  INTERVENTIONS:  - Identify barriers to discharge w/patient and caregiver  - Arrange for needed discharge resources and transportation as appropriate  - Identify discharge learning needs (meds, wound care, etc )  - Arrange for interpretive services to assist at discharge as needed  - Refer to Case Management Department for coordinating discharge planning if the patient needs post-hospital services based on physician/advanced practitioner order or complex needs related to functional status, cognitive ability, or social support system  Outcome: Progressing     Problem: RESPIRATORY - ADULT  Goal: Achieves optimal ventilation and oxygenation  Description  INTERVENTIONS:  - Assess for changes in respiratory status  - Assess for changes in mentation and behavior  - Position to facilitate oxygenation and minimize respiratory effort  - Oxygen administration by appropriate delivery method based on oxygen saturation (per order) or ABGs  - Initiate smoking cessation education as indicated  - Encourage broncho-pulmonary hygiene including cough, deep breathe, Incentive Spirometry  - Assess the need for suctioning and aspirate as needed  - Assess and instruct to report SOB or any respiratory difficulty  - Respiratory Therapy support as indicated  Outcome: Progressing     Problem: Potential for Falls  Goal: Patient will remain free of falls  Description  INTERVENTIONS:  - Assess patient frequently for physical needs  -  Identify cognitive and physical deficits and behaviors that affect risk of falls    -  [place] fall precautions as indicated by assessment   - Educate patient/family on patient safety including physical limitations  - Instruct patient to call for assistance with activity based on assessment  - Modify environment to reduce risk of injury  - Consider OT/PT consult to assist with strengthening/mobility  Outcome: Progressing     Problem: PAIN - ADULT  Goal: Verbalizes/displays adequate comfort level or baseline comfort level  Description  Interventions:  - Encourage patient to monitor pain and request assistance  - Assess pain using appropriate pain scale  - Administer analgesics based on type and severity of pain and evaluate response  - Implement non-pharmacological measures as appropriate and evaluate response  - Consider cultural and social influences on pain and pain management  - Notify physician/advanced practitioner if interventions unsuccessful or patient reports new pain  Outcome: Progressing     Problem: INFECTION - ADULT  Goal: Absence or prevention of progression during hospitalization  Description  INTERVENTIONS:  - Assess and monitor for signs and symptoms of infection  - Monitor lab/diagnostic results  - Monitor all insertion sites, IV  - [place] appropriate cooling/warming therapies per order  - Administer medications as ordered  - Instruct and encourage patient and family to use good hand hygiene technique  - Identify and instruct in appropriate isolation precautions for identified infection/condition   Outcome: Progressing     Problem: SAFETY ADULT  Goal: Patient will remain free of falls  Description  INTERVENTIONS:  - Assess patient frequently for physical needs  -  Identify cognitive and physical deficits and behaviors that affect risk of falls    -  [place] fall precautions as indicated by assessment   - Educate patient/family on patient safety including physical limitations  - Instruct patient to call for assistance with activity based on assessment  - Modify environment to reduce risk of injury  - Consider OT/PT consult to assist with strengthening/mobility  Outcome: Progressing     Problem: DISCHARGE PLANNING  Goal: Discharge to home or other facility with appropriate resources  Description  INTERVENTIONS:  - Identify barriers to discharge w/patient and caregiver  - Arrange for needed discharge resources and transportation as appropriate  - Identify discharge learning needs (meds, wound care, etc )  - Arrange for interpretive services to assist at discharge as needed  - Refer to Case Management Department for coordinating discharge planning if the patient needs post-hospital services based on physician/advanced practitioner order or complex needs related to functional status, cognitive ability, or social support system  Outcome: Progressing     Problem: RESPIRATORY - ADULT  Goal: Achieves optimal ventilation and oxygenation  Description  INTERVENTIONS:  - Assess for changes in respiratory status  - Assess for changes in mentation and behavior  - Position to facilitate oxygenation and minimize respiratory effort  - Oxygen administration by appropriate delivery method based on oxygen saturation (per order) or ABGs  - Initiate smoking cessation education as indicated  - Encourage broncho-pulmonary hygiene including cough, deep breathe, Incentive Spirometry  - Assess the need for suctioning and aspirate as needed  - Assess and instruct to report SOB or any respiratory difficulty  - Respiratory Therapy support as indicated  Outcome: Progressing     Problem: Potential for Falls  Goal: Patient will remain free of falls  Description  INTERVENTIONS:  - Assess patient frequently for physical needs  -  Identify cognitive and physical deficits and behaviors that affect risk of falls    -  [place] fall precautions as indicated by assessment   - Educate patient/family on patient safety including physical limitations  - Instruct patient to call for assistance with activity based on assessment  - Modify environment to reduce risk of injury  - Consider OT/PT consult to assist with strengthening/mobility  Outcome: Progressing

## 2019-07-02 ENCOUNTER — APPOINTMENT (INPATIENT)
Dept: RADIOLOGY | Facility: HOSPITAL | Age: 74
DRG: 189 | End: 2019-07-02
Payer: MEDICARE

## 2019-07-02 LAB
ANION GAP SERPL CALCULATED.3IONS-SCNC: 9 MMOL/L (ref 4–13)
BASOPHILS # BLD MANUAL: 0 THOUSAND/UL (ref 0–0.1)
BASOPHILS NFR MAR MANUAL: 0 % (ref 0–1)
BUN SERPL-MCNC: 17 MG/DL (ref 5–25)
CALCIUM SERPL-MCNC: 8.8 MG/DL (ref 8.3–10.1)
CHLORIDE SERPL-SCNC: 105 MMOL/L (ref 100–108)
CO2 SERPL-SCNC: 28 MMOL/L (ref 21–32)
CREAT SERPL-MCNC: 1.1 MG/DL (ref 0.6–1.3)
EOSINOPHIL # BLD MANUAL: 0.09 THOUSAND/UL (ref 0–0.4)
EOSINOPHIL NFR BLD MANUAL: 1 % (ref 0–6)
ERYTHROCYTE [DISTWIDTH] IN BLOOD BY AUTOMATED COUNT: 13.2 % (ref 11.6–15.1)
GFR SERPL CREATININE-BSD FRML MDRD: 65 ML/MIN/1.73SQ M
GLUCOSE SERPL-MCNC: 132 MG/DL (ref 65–140)
HCT VFR BLD AUTO: 37.9 % (ref 36.5–49.3)
HGB BLD-MCNC: 12.7 G/DL (ref 12–17)
LYMPHOCYTES # BLD AUTO: 0.61 THOUSAND/UL (ref 0.6–4.47)
LYMPHOCYTES # BLD AUTO: 7 % (ref 14–44)
MAGNESIUM SERPL-MCNC: 2.1 MG/DL (ref 1.6–2.6)
MCH RBC QN AUTO: 32.4 PG (ref 26.8–34.3)
MCHC RBC AUTO-ENTMCNC: 33.5 G/DL (ref 31.4–37.4)
MCV RBC AUTO: 97 FL (ref 82–98)
MONOCYTES # BLD AUTO: 0.44 THOUSAND/UL (ref 0–1.22)
MONOCYTES NFR BLD: 5 % (ref 4–12)
NEUTROPHILS # BLD MANUAL: 7.62 THOUSAND/UL (ref 1.85–7.62)
NEUTS SEG NFR BLD AUTO: 87 % (ref 43–75)
NRBC BLD AUTO-RTO: 0 /100 WBCS
PHOSPHATE SERPL-MCNC: 3.3 MG/DL (ref 2.3–4.1)
PLATELET # BLD AUTO: 188 THOUSANDS/UL (ref 149–390)
PLATELET BLD QL SMEAR: ADEQUATE
PMV BLD AUTO: 9.2 FL (ref 8.9–12.7)
POTASSIUM SERPL-SCNC: 4.4 MMOL/L (ref 3.5–5.3)
RBC # BLD AUTO: 3.92 MILLION/UL (ref 3.88–5.62)
RBC MORPH BLD: NORMAL
SODIUM SERPL-SCNC: 142 MMOL/L (ref 136–145)
TOTAL CELLS COUNTED SPEC: 100
WBC # BLD AUTO: 8.76 THOUSAND/UL (ref 4.31–10.16)

## 2019-07-02 PROCEDURE — 93306 TTE W/DOPPLER COMPLETE: CPT | Performed by: INTERNAL MEDICINE

## 2019-07-02 PROCEDURE — 94760 N-INVAS EAR/PLS OXIMETRY 1: CPT

## 2019-07-02 PROCEDURE — 99233 SBSQ HOSP IP/OBS HIGH 50: CPT | Performed by: FAMILY MEDICINE

## 2019-07-02 PROCEDURE — 97110 THERAPEUTIC EXERCISES: CPT

## 2019-07-02 PROCEDURE — 85027 COMPLETE CBC AUTOMATED: CPT | Performed by: STUDENT IN AN ORGANIZED HEALTH CARE EDUCATION/TRAINING PROGRAM

## 2019-07-02 PROCEDURE — 99232 SBSQ HOSP IP/OBS MODERATE 35: CPT | Performed by: INTERNAL MEDICINE

## 2019-07-02 PROCEDURE — 83735 ASSAY OF MAGNESIUM: CPT | Performed by: STUDENT IN AN ORGANIZED HEALTH CARE EDUCATION/TRAINING PROGRAM

## 2019-07-02 PROCEDURE — 94640 AIRWAY INHALATION TREATMENT: CPT

## 2019-07-02 PROCEDURE — 80048 BASIC METABOLIC PNL TOTAL CA: CPT | Performed by: STUDENT IN AN ORGANIZED HEALTH CARE EDUCATION/TRAINING PROGRAM

## 2019-07-02 PROCEDURE — 84100 ASSAY OF PHOSPHORUS: CPT | Performed by: STUDENT IN AN ORGANIZED HEALTH CARE EDUCATION/TRAINING PROGRAM

## 2019-07-02 PROCEDURE — 71046 X-RAY EXAM CHEST 2 VIEWS: CPT

## 2019-07-02 PROCEDURE — 85007 BL SMEAR W/DIFF WBC COUNT: CPT | Performed by: STUDENT IN AN ORGANIZED HEALTH CARE EDUCATION/TRAINING PROGRAM

## 2019-07-02 RX ORDER — METHYLPREDNISOLONE SODIUM SUCCINATE 40 MG/ML
20 INJECTION, POWDER, LYOPHILIZED, FOR SOLUTION INTRAMUSCULAR; INTRAVENOUS EVERY 12 HOURS SCHEDULED
Status: DISCONTINUED | OUTPATIENT
Start: 2019-07-03 | End: 2019-07-03 | Stop reason: HOSPADM

## 2019-07-02 RX ORDER — DOCUSATE SODIUM 100 MG/1
100 CAPSULE, LIQUID FILLED ORAL 2 TIMES DAILY
Status: DISCONTINUED | OUTPATIENT
Start: 2019-07-02 | End: 2019-07-03 | Stop reason: HOSPADM

## 2019-07-02 RX ORDER — POLYETHYLENE GLYCOL 3350 17 G/17G
17 POWDER, FOR SOLUTION ORAL DAILY PRN
Status: DISCONTINUED | OUTPATIENT
Start: 2019-07-02 | End: 2019-07-03 | Stop reason: HOSPADM

## 2019-07-02 RX ADMIN — AMLODIPINE BESYLATE 10 MG: 10 TABLET ORAL at 08:54

## 2019-07-02 RX ADMIN — METHYLPREDNISOLONE SODIUM SUCCINATE 20 MG: 40 INJECTION, POWDER, FOR SOLUTION INTRAMUSCULAR; INTRAVENOUS at 06:09

## 2019-07-02 RX ADMIN — SUCRALFATE 500 MG: 1 SUSPENSION ORAL at 06:09

## 2019-07-02 RX ADMIN — IPRATROPIUM BROMIDE AND ALBUTEROL SULFATE 3 ML: 2.5; .5 SOLUTION RESPIRATORY (INHALATION) at 15:31

## 2019-07-02 RX ADMIN — GABAPENTIN 300 MG: 300 CAPSULE ORAL at 08:54

## 2019-07-02 RX ADMIN — PANTOPRAZOLE SODIUM 40 MG: 40 TABLET, DELAYED RELEASE ORAL at 08:54

## 2019-07-02 RX ADMIN — DOCUSATE SODIUM 100 MG: 100 CAPSULE, LIQUID FILLED ORAL at 14:29

## 2019-07-02 RX ADMIN — FLUTICASONE FUROATE AND VILANTEROL TRIFENATATE 1 PUFF: 200; 25 POWDER RESPIRATORY (INHALATION) at 08:53

## 2019-07-02 RX ADMIN — IPRATROPIUM BROMIDE AND ALBUTEROL SULFATE 3 ML: 2.5; .5 SOLUTION RESPIRATORY (INHALATION) at 23:08

## 2019-07-02 RX ADMIN — FINASTERIDE 5 MG: 5 TABLET, FILM COATED ORAL at 08:54

## 2019-07-02 RX ADMIN — SUCRALFATE 500 MG: 1 SUSPENSION ORAL at 16:08

## 2019-07-02 RX ADMIN — ENOXAPARIN SODIUM 40 MG: 40 INJECTION SUBCUTANEOUS at 08:54

## 2019-07-02 RX ADMIN — IPRATROPIUM BROMIDE AND ALBUTEROL SULFATE 3 ML: 2.5; .5 SOLUTION RESPIRATORY (INHALATION) at 02:46

## 2019-07-02 RX ADMIN — PANTOPRAZOLE SODIUM 40 MG: 40 TABLET, DELAYED RELEASE ORAL at 17:56

## 2019-07-02 RX ADMIN — TAMSULOSIN HYDROCHLORIDE 0.4 MG: 0.4 CAPSULE ORAL at 08:54

## 2019-07-02 RX ADMIN — IPRATROPIUM BROMIDE AND ALBUTEROL SULFATE 3 ML: 2.5; .5 SOLUTION RESPIRATORY (INHALATION) at 19:07

## 2019-07-02 RX ADMIN — GABAPENTIN 300 MG: 300 CAPSULE ORAL at 20:30

## 2019-07-02 RX ADMIN — DOCUSATE SODIUM 100 MG: 100 CAPSULE, LIQUID FILLED ORAL at 17:56

## 2019-07-02 RX ADMIN — IPRATROPIUM BROMIDE AND ALBUTEROL SULFATE 3 ML: 2.5; .5 SOLUTION RESPIRATORY (INHALATION) at 11:19

## 2019-07-02 RX ADMIN — GABAPENTIN 300 MG: 300 CAPSULE ORAL at 16:08

## 2019-07-02 RX ADMIN — IPRATROPIUM BROMIDE AND ALBUTEROL SULFATE 3 ML: 2.5; .5 SOLUTION RESPIRATORY (INHALATION) at 07:53

## 2019-07-02 RX ADMIN — ZOLPIDEM TARTRATE 10 MG: 5 TABLET, COATED ORAL at 23:40

## 2019-07-02 RX ADMIN — METHYLPREDNISOLONE SODIUM SUCCINATE 20 MG: 40 INJECTION, POWDER, FOR SOLUTION INTRAMUSCULAR; INTRAVENOUS at 14:28

## 2019-07-02 NOTE — PHYSICAL THERAPY NOTE
PT TREATMENT     07/02/19 1600   Pain Assessment   Pain Assessment No/denies pain   Restrictions/Precautions   Other Precautions Fall Risk;O2   General   Chart Reviewed Yes   Family/Caregiver Present No   Cognition   Arousal/Participation Cooperative   Subjective   Subjective patient without pain    Exercises   Heelslides Supine;20 reps;Bilateral   Glute Sets Supine;20 reps;Bilateral   Hip Flexion Supine;20 reps;Bilateral   Hip Abduction Supine;20 reps;Bilateral   Knee AROM Short Arc Quad Supine;20 reps;Bilateral   Ankle Pumps Supine;20 reps;Bilateral   Bridging Supine;20 reps;Bilateral   Assessment   Assessment patient cooperative and motivated and will benefit from continued PT with progression as tolerated  Plan   Treatment/Interventions ADL retraining;Functional transfer training;LE strengthening/ROM; Therapeutic exercise; Endurance training;Patient/family training;Equipment eval/education;Gait training; Compensatory technique education   PT Frequency 5x/wk   Recommendation   Recommendation Home PT   Licensure   [place] License Number  Murray Mortensen PT [id number]

## 2019-07-02 NOTE — PROGRESS NOTES
Progress Note - Pulmonary   Elle Horne 75 y o  male MRN: [id number]  Unit/Bed#: [address] Encounter: [id number]    Assessment:  Dyspnea which is somewhat improved but still having shortness of breath   With exertion  Acute exacerbation of very severe emphysema  Slight improvement  FEV1 1 6 L or 40% predicted  Chronic hypoxemic respiratory - patient is on 3 L of oxygen at home and here  O2 saturation satisfactory  Alpha-1 antitrypsin deficiency  No evidence of acute PE on CTA of chest done this admission  Very small amount of chronic clot in the distal left main pulmonary extend into the proximal left lower lobe pulmonary artery  Plan:  If patient remains the same overnight could decrease dose of Solu-Medrol to 20 mg IV every 12 hours tomorrow  Continue neb treatments with DuoNeb every 4 hours  I reviewed chest x-ray from today  No new abnormalities  Minimal linear density and right upper lobe with no significant change    Patient is due for his weekly dose of IV alpha-1 antitrypsin protein tomorrow July 3rd but this can be postponed and given as outpatient        Subjective:   Still having shortness of breath walking 20 feet or more    Objective:     Vitals: Blood pressure 167/85, pulse 69, temperature 98 1 °F (36 7 °C), temperature source Oral, resp  rate 20, height 6' 5" (1 956 m), weight 85 kg (187 lb 6 3 oz), SpO2 98 %  ,Body mass index is 22 22 kg/m²  Intake/Output Summary (Last 24 hours) at 7/2/2019 1655  Last data filed at 7/2/2019 0455  Gross per 24 hour   Intake    Output 800 ml   Net -800 ml       Physical Exam: Physical Exam   Constitutional: He is oriented to person, place, and time  He appears well-developed and well-nourished  No distress  On 3 L of oxygen O2 saturation 96%   HENT:   Head: Normocephalic  Nose: Nose normal    Mouth/Throat: Oropharynx is clear and moist  No oropharyngeal exudate  Eyes: Pupils are equal, round, and reactive to light   Conjunctivae are normal  Neck: Neck supple  No JVD present  No tracheal deviation present  Cardiovascular: Normal rate, regular rhythm and normal heart sounds  Pulmonary/Chest: Effort normal    Lung sounds diminished but clear   Abdominal: Soft  He exhibits no distension  There is no tenderness  There is no guarding  Musculoskeletal: He exhibits no edema  Minimal edema lower extremities   Lymphadenopathy:     He has no cervical adenopathy  Neurological: He is alert and oriented to person, place, and time  Skin: Skin is warm and dry  No rash noted  Psychiatric: He has a normal mood and affect  His behavior is normal  Thought content normal         Labs: I have personally reviewed pertinent lab results  , ABG:   Lab Results   Component Value Date    PHART 7 469 (H) 07/01/2019    XOP7AJJ 32 9 (L) 07/01/2019    PO2ART 90 6 07/01/2019    XSK7PNR 23 4 07/01/2019    BEART 0 3 07/01/2019    SOURCE Radial, Left 07/01/2019   , BNP: No results found for: BNP, CBC:   Lab Results   Component Value Date    WBC 8 76 07/02/2019    HGB 12 7 07/02/2019    HCT 37 9 07/02/2019    MCV 97 07/02/2019     07/02/2019    MCH 32 4 07/02/2019    MCHC 33 5 07/02/2019    RDW 13 2 07/02/2019    MPV 9 2 07/02/2019    NRBC 0 07/02/2019   , CMP:   Lab Results   Component Value Date     09/27/2016    K 4 4 07/02/2019    K 4 2 09/27/2016     07/02/2019     09/27/2016    CO2 28 07/02/2019    CO2 23 09/27/2016    BUN 17 07/02/2019    BUN 14 09/27/2016    CREATININE 1 10 07/02/2019    CREATININE 1 09 09/27/2016    GLUCOSE 89 09/27/2016    CALCIUM 8 8 07/02/2019    CALCIUM 9 3 09/27/2016    AST 23 06/28/2019    AST 18 09/27/2016    ALT 33 06/28/2019    ALT 16 09/27/2016    ALKPHOS 82 06/28/2019    ALKPHOS 57 09/27/2016    PROT 6 9 09/27/2016    BILITOT 1 0 09/27/2016    EGFR 65 07/02/2019   , PT/INR:   Lab Results   Component Value Date    INR 1 01 06/28/2019   , Troponin: No results found for: TROPONIN    Imaging and other studies: I have personally reviewed pertinent reports     and I have personally reviewed pertinent films in PACS

## 2019-07-02 NOTE — PROGRESS NOTES
Progress Note - Lillian Lozada 75 y o  male MRN: [id number]    Unit/Bed#: [address] [address] Encounter: [id number]      Assessment & Plan:  Patient is a 65-year-old male with past medical history of COPD, alpha 1 enzyme deficiency, emphysema, BPH, hypertension, history of nephrolithiasis, who presented on June show any of this with severe shortness of breath and dyspnea on exertion  He is currently being treated with Solu-Medrol 20 q8 hours of Breo Ellipta and duo nebs  He is still not at baseline  We will continue to follow clinically  * Acute on chronic respiratory failure with hypoxia [facility])  Assessment & Plan  Patient continues to complain of SOB  Markedly tachypneic with ambulation to bathroom and back to bed  This is not normal for him  He typically can walk around his house, go grocery shopping, take walks, etc, with breaks and mild SOB  · Procalcitonin negative, pt remains afebrile, WBC WNL  · D dimer 950, suspicion for PE given prior history however CTA PE study negative for acute process  Anticoagulation therapy with Dr Gunnar Moyer completed prior to admission  · CXR: Very mild increased reticular interstitial markings in the right upper lobe, possibly representing very early infiltrate  Repeat CXR showed Discoid atelectasis versus scarring right upper lobe  Similar to findings in the past   · ECHO showed EF of 33%, grade 1 diastolic dysfunction, peak PA pressure 51 mmHG  · Patient on 3 L O2 home oxygen  Currently on 3 L NC-will titrate as needed  · Duo nebs q4h daily and q2h prn  · Currently giving Solu-Medrol 20 mg IV q8h  ·  Continue to monitor clinically and follow pulmonology recommendations  · Pulm outpatient recommendations: complete pulmonary function testing; continueProventil, Trelegy, duo nebulizers       AAT (alpha-1-antitrypsin) deficiency ([facility] )  Assessment & Plan  · History noted upon admission  · May be contributory to patient's respiratory issues  · Manage as above for respiratory failure  Insomnia  Assessment & Plan  · Zolpidem 10 mg p o  HS p r n , short-term    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 (HCC)  Assessment & Plan  Two venous ulcerations left lower extremity lateral and medial aspect distal left lower extremity  · Follows outpatient with wound care  Patient has an appointment tomorrow-inpatient wound care was called to treat wound in lieu of outpatient visit  · No signs of infection at this time  Chronic venous insufficiency  Assessment & Plan  · Patient with venous ulcers left lower extremity, follows with vascular and wound care outpatient  · Currently stable at this time with no signs of infection  Neuropathy  Assessment & Plan  · Continue home medication gabapentin 300 mg p o  T i d  Centrilobular emphysema ([address] )  Assessment & Plan  · History noted upon admission  Likely contributory to patient's chronic respiratory failure  · Management as above  · Patient taking home Breo Ellipta daily during hospital stay  · Patient follows outpatient with Reji Dumont    Former smoker  Assessment & Plan  · Smoked pack per day or more over 20 years, quit 2 years ago  · Likely cause of patient's COPD/    BPH (benign prostatic hyperplasia)  Assessment & Plan  · Continue Flomax 0 4 mg p o  daily    Essential hypertension  Assessment & Plan  · Continue home medication amlodipine 10 mg p o  daily  · Monitor blood pressures with scheduled vitals    Gastroesophageal reflux disease  Assessment & Plan  · Continue home medications  · Protonix 40 mg p o  b i d  · Carafate 500 mg p o  b i d  A c  CLAYTON (acute kidney injury) (HCC)resolved as of 6/30/2019  Assessment & Plan  Upon admission, creatinine 1 97  Baseline around 1 2-1 3  Patient with history of recent Lasix use for left lower extremity swelling  · Lasix held  · Avoid nephrotoxic agents including home lasix    · Will have to touch base with vascular about discontinuing lasix as he develops CLAYTON every time he takes this medication  · NS 75 cc/hour  · RESOLVED as of 6/30 with creatinine of 1 08    Global:  Regular diet    Subjective:   Patient states that his breathing is somewhat improved from yesterday, but he still gets dyspnea when ambulating to and from the bathroom more than at his baseline  He remains on 3 L O2 NC, which is is home dose  At home he is able to walk around the house, get groceries, and go for walks  He will however get dyspnea if he does not rest occasionally  He report mild sharp midsternal chest pain radiating to his abdomen that is not worsened or improved by anything  It just "sits there"  He has some tenderness in the left ankle where his ulcer is  This is not new  Patient has not had a bowel movement in 3 days  He denies pain  Objective:     General: Pt is AAO x 3, not in any acute distress  Cardio: RRR, S1 and S2 +, no murmurs/rubs/gallops/clicks  Resp: CTA b/l, no wheezes/rales/rhonchi  Abdomen: soft, NT/ND, BS+  Extremities: no cyanosis or edema  Moderate bruising on ankle bilaterally  Ulcer wound on left ankle  Currently wrapped with gauze and ace bandages  Vitals: Blood pressure 167/85, pulse 69, temperature 98 1 °F (36 7 °C), temperature source Oral, resp  rate 20, height 6' 5" (1 956 m), weight 85 kg (187 lb 6 3 oz), SpO2 98 %  ,Body mass index is 22 22 kg/m²  Intake/Output Summary (Last 24 hours) at 7/2/2019 6422  Last data filed at 7/2/2019 0455  Gross per 24 hour   Intake    Output 800 ml   Net -800 ml           Invasive Devices     Peripheral Intravenous Line            Peripheral IV 06/28/19 Left Forearm 3 days                Lab, Imaging and other studies: I have personally reviewed pertinent reports  VTE Pharmacologic Prophylaxis: Enoxaparin (Lovenox)  VTE Mechanical Prophylaxis: reason for no mechanical VTE prophylaxis Placement has painful ulcer on left foot and is ambulating

## 2019-07-02 NOTE — PLAN OF CARE
Problem: PAIN - ADULT  Goal: Verbalizes/displays adequate comfort level or baseline comfort level  Description  Interventions:  - Encourage patient to monitor pain and request assistance  - Assess pain using appropriate pain scale  - Administer analgesics based on type and severity of pain and evaluate response  - Implement non-pharmacological measures as appropriate and evaluate response  - Consider cultural and social influences on pain and pain management  - Notify physician/advanced practitioner if interventions unsuccessful or patient reports new pain  Outcome: Progressing     Problem: INFECTION - ADULT  Goal: Absence or prevention of progression during hospitalization  Description  INTERVENTIONS:  - Assess and monitor for signs and symptoms of infection  - Monitor lab/diagnostic results  - Monitor all insertion sites, IV  - [place] appropriate cooling/warming therapies per order  - Administer medications as ordered  - Instruct and encourage patient and family to use good hand hygiene technique  - Identify and instruct in appropriate isolation precautions for identified infection/condition   Outcome: Progressing     Problem: SAFETY ADULT  Goal: Patient will remain free of falls  Description  INTERVENTIONS:  - Assess patient frequently for physical needs  -  Identify cognitive and physical deficits and behaviors that affect risk of falls    -  [place] fall precautions as indicated by assessment   - Educate patient/family on patient safety including physical limitations  - Instruct patient to call for assistance with activity based on assessment  - Modify environment to reduce risk of injury  - Consider OT/PT consult to assist with strengthening/mobility  Outcome: Progressing     Problem: DISCHARGE PLANNING  Goal: Discharge to home or other facility with appropriate resources  Description  INTERVENTIONS:  - Identify barriers to discharge w/patient and caregiver  - Arrange for needed discharge resources and transportation as appropriate  - Identify discharge learning needs (meds, wound care, etc )  - Arrange for interpretive services to assist at discharge as needed  - Refer to Case Management Department for coordinating discharge planning if the patient needs post-hospital services based on physician/advanced practitioner order or complex needs related to functional status, cognitive ability, or social support system  Outcome: Progressing     Problem: RESPIRATORY - ADULT  Goal: Achieves optimal ventilation and oxygenation  Description  INTERVENTIONS:  - Assess for changes in respiratory status  - Assess for changes in mentation and behavior  - Position to facilitate oxygenation and minimize respiratory effort  - Oxygen administration by appropriate delivery method based on oxygen saturation (per order) or ABGs  - Initiate smoking cessation education as indicated  - Encourage broncho-pulmonary hygiene including cough, deep breathe, Incentive Spirometry  - Assess the need for suctioning and aspirate as needed  - Assess and instruct to report SOB or any respiratory difficulty  - Respiratory Therapy support as indicated  Outcome: Progressing     Problem: Potential for Falls  Goal: Patient will remain free of falls  Description  INTERVENTIONS:  - Assess patient frequently for physical needs  -  Identify cognitive and physical deficits and behaviors that affect risk of falls    -  [place] fall precautions as indicated by assessment   - Educate patient/family on patient safety including physical limitations  - Instruct patient to call for assistance with activity based on assessment  - Modify environment to reduce risk of injury  - Consider OT/PT consult to assist with strengthening/mobility  Outcome: Progressing

## 2019-07-02 NOTE — OCCUPATIONAL THERAPY NOTE
OT TREATMENT       07/02/19 1525   Restrictions/Precautions   Other Precautions Fall Risk;O2   General   Family/Caregiver Present No   Pain Assessment   Pain Assessment No/denies pain   Pain Score No Pain   ADL   Toileting Assistance  5  Supervision/Setup   Toileting Deficit Steadying;Supervison/safety   Toileting Comments Pt toileting when OT enterred room  Benefits from supervision for balance and safety  Transfers   Sit to Stand 5  Supervision   Stand to Sit 5  Supervision   Functional Mobility   Functional Mobility 5  Supervision   Additional Comments Pt ambulated 15 feet from bathroom to bed using [facility] with supervision for safety  Pt reachng for walls to further steady himself while ambulating  Additional items SPC   Right Upper Extremity- Strength   R Shoulder Flexion; Horizontal ABduction; External rotation; Internal rotation   R Elbow Elbow flexion;Elbow extension  (Chest press and bicep curls)   R Position Seated  (EOB)   Equipment Dumbbell  (1 lb)   R Weight/Reps/Sets 2 sets of 15 each exercise   RUE Strength Comment Pt tolerated exercises well with rest breaks as necessary and verbal cueing for diaphragmatic breathing  Pt demonstrated understanding  Left Upper Extremity-Strength   L Shoulder Flexion; Horizontal ABduction; External rotation; Internal rotation   L Elbow Elbow flexion;Elbow extension  (Chest press and bicep curls)   L Position Seated  (EOB)   Equipment Dumbell  (1 lb)   L Weights/Reps/Sets 2 sets 15 each exercise   LUE Strength Comment Pt tolerated exercises well with rest breaks as necessary and verbal cueing for diaphragmatic breathing  Pt demonstrated understanding  Cognition   Overall Cognitive Status WFL   Activity Tolerance   Activity Tolerance Patient tolerated treatment well   Assessment   Assessment Pt willing to participate in therapy and tolerated treatment well  OT provided education regarding diaphragmatic breathing and embedding technique into daily tasks   Pt verbalized and demonstrated understanding but continues to benefit from verbal cues to initiate use of technque  Pt will continue to benefit from skilled occupational therapy services to increase independence and safety with ADLs and functional mobility  Plan   Treatment Interventions Functional transfer training;UE strengthening/ROM; Endurance training;Energy conservation; Activityengagement   Recommendation   OT Discharge Recommendation Home OT   Licensure   [place] License Number  Jessica Harrington [address] [id number]

## 2019-07-03 ENCOUNTER — TELEPHONE (OUTPATIENT)
Dept: VASCULAR SURGERY | Facility: CLINIC | Age: 74
End: 2019-07-03

## 2019-07-03 VITALS
WEIGHT: 187.39 LBS | TEMPERATURE: 98.1 F | OXYGEN SATURATION: 96 % | SYSTOLIC BLOOD PRESSURE: 123 MMHG | RESPIRATION RATE: 19 BRPM | DIASTOLIC BLOOD PRESSURE: 64 MMHG | HEIGHT: 77 IN | BODY MASS INDEX: 22.13 KG/M2 | HEART RATE: 72 BPM

## 2019-07-03 DIAGNOSIS — N40.1 BENIGN PROSTATIC HYPERPLASIA WITH URINARY FREQUENCY: ICD-10-CM

## 2019-07-03 DIAGNOSIS — K21.9 GASTROESOPHAGEAL REFLUX DISEASE WITHOUT ESOPHAGITIS: ICD-10-CM

## 2019-07-03 DIAGNOSIS — R35.0 BENIGN PROSTATIC HYPERPLASIA WITH URINARY FREQUENCY: ICD-10-CM

## 2019-07-03 LAB
ANION GAP SERPL CALCULATED.3IONS-SCNC: 7 MMOL/L (ref 4–13)
ATRIAL RATE: 91 BPM
BASOPHILS # BLD AUTO: 0.04 THOUSANDS/ΜL (ref 0–0.1)
BASOPHILS NFR BLD AUTO: 0 % (ref 0–1)
BUN SERPL-MCNC: 24 MG/DL (ref 5–25)
CALCIUM SERPL-MCNC: 8.4 MG/DL (ref 8.3–10.1)
CHLORIDE SERPL-SCNC: 105 MMOL/L (ref 100–108)
CO2 SERPL-SCNC: 29 MMOL/L (ref 21–32)
CREAT SERPL-MCNC: 1.09 MG/DL (ref 0.6–1.3)
EOSINOPHIL # BLD AUTO: 0.02 THOUSAND/ΜL (ref 0–0.61)
EOSINOPHIL NFR BLD AUTO: 0 % (ref 0–6)
ERYTHROCYTE [DISTWIDTH] IN BLOOD BY AUTOMATED COUNT: 13.2 % (ref 11.6–15.1)
GFR SERPL CREATININE-BSD FRML MDRD: 66 ML/MIN/1.73SQ M
GLUCOSE SERPL-MCNC: 120 MG/DL (ref 65–140)
HCT VFR BLD AUTO: 37.6 % (ref 36.5–49.3)
HGB BLD-MCNC: 12.6 G/DL (ref 12–17)
IMM GRANULOCYTES # BLD AUTO: 0.5 THOUSAND/UL (ref 0–0.2)
IMM GRANULOCYTES NFR BLD AUTO: 5 % (ref 0–2)
LYMPHOCYTES # BLD AUTO: 1.97 THOUSANDS/ΜL (ref 0.6–4.47)
LYMPHOCYTES NFR BLD AUTO: 18 % (ref 14–44)
MAGNESIUM SERPL-MCNC: 2.3 MG/DL (ref 1.6–2.6)
MCH RBC QN AUTO: 32.6 PG (ref 26.8–34.3)
MCHC RBC AUTO-ENTMCNC: 33.5 G/DL (ref 31.4–37.4)
MCV RBC AUTO: 97 FL (ref 82–98)
MONOCYTES # BLD AUTO: 0.9 THOUSAND/ΜL (ref 0.17–1.22)
MONOCYTES NFR BLD AUTO: 8 % (ref 4–12)
NEUTROPHILS # BLD AUTO: 7.34 THOUSANDS/ΜL (ref 1.85–7.62)
NEUTS SEG NFR BLD AUTO: 69 % (ref 43–75)
NRBC BLD AUTO-RTO: 0 /100 WBCS
PHOSPHATE SERPL-MCNC: 2.9 MG/DL (ref 2.3–4.1)
PLATELET # BLD AUTO: 184 THOUSANDS/UL (ref 149–390)
PMV BLD AUTO: 9.4 FL (ref 8.9–12.7)
POTASSIUM SERPL-SCNC: 3.7 MMOL/L (ref 3.5–5.3)
PR INTERVAL: 154 MS
QRS AXIS: 29 DEGREES
QRSD INTERVAL: 98 MS
QT INTERVAL: 374 MS
QTC INTERVAL: 460 MS
RBC # BLD AUTO: 3.87 MILLION/UL (ref 3.88–5.62)
SODIUM SERPL-SCNC: 141 MMOL/L (ref 136–145)
T WAVE AXIS: 0 DEGREES
VENTRICULAR RATE: 91 BPM
WBC # BLD AUTO: 10.77 THOUSAND/UL (ref 4.31–10.16)

## 2019-07-03 PROCEDURE — 99238 HOSP IP/OBS DSCHRG MGMT 30/<: CPT | Performed by: FAMILY MEDICINE

## 2019-07-03 PROCEDURE — 94760 N-INVAS EAR/PLS OXIMETRY 1: CPT

## 2019-07-03 PROCEDURE — 97116 GAIT TRAINING THERAPY: CPT

## 2019-07-03 PROCEDURE — 94640 AIRWAY INHALATION TREATMENT: CPT

## 2019-07-03 PROCEDURE — 80048 BASIC METABOLIC PNL TOTAL CA: CPT | Performed by: STUDENT IN AN ORGANIZED HEALTH CARE EDUCATION/TRAINING PROGRAM

## 2019-07-03 PROCEDURE — 99232 SBSQ HOSP IP/OBS MODERATE 35: CPT | Performed by: PHYSICIAN ASSISTANT

## 2019-07-03 PROCEDURE — 93010 ELECTROCARDIOGRAM REPORT: CPT | Performed by: INTERNAL MEDICINE

## 2019-07-03 PROCEDURE — 97110 THERAPEUTIC EXERCISES: CPT

## 2019-07-03 PROCEDURE — 85025 COMPLETE CBC W/AUTO DIFF WBC: CPT | Performed by: STUDENT IN AN ORGANIZED HEALTH CARE EDUCATION/TRAINING PROGRAM

## 2019-07-03 PROCEDURE — 83735 ASSAY OF MAGNESIUM: CPT | Performed by: STUDENT IN AN ORGANIZED HEALTH CARE EDUCATION/TRAINING PROGRAM

## 2019-07-03 PROCEDURE — 84100 ASSAY OF PHOSPHORUS: CPT | Performed by: STUDENT IN AN ORGANIZED HEALTH CARE EDUCATION/TRAINING PROGRAM

## 2019-07-03 RX ORDER — METHYLPREDNISOLONE SODIUM SUCCINATE 40 MG/ML
20 INJECTION, POWDER, LYOPHILIZED, FOR SOLUTION INTRAMUSCULAR; INTRAVENOUS ONCE AS NEEDED
Status: COMPLETED | OUTPATIENT
Start: 2019-07-03 | End: 2019-07-03

## 2019-07-03 RX ORDER — PANTOPRAZOLE SODIUM 40 MG/1
TABLET, DELAYED RELEASE ORAL
Qty: 60 TABLET | Refills: 1 | Status: SHIPPED | OUTPATIENT
Start: 2019-07-03 | End: 2019-09-16 | Stop reason: SDUPTHER

## 2019-07-03 RX ORDER — TAMSULOSIN HYDROCHLORIDE 0.4 MG/1
CAPSULE ORAL
Qty: 30 CAPSULE | Refills: 3 | Status: SHIPPED | OUTPATIENT
Start: 2019-07-03 | End: 2019-11-21 | Stop reason: SDUPTHER

## 2019-07-03 RX ORDER — PREDNISONE 10 MG/1
TABLET ORAL
Qty: 30 TABLET | Refills: 0 | Status: SHIPPED | OUTPATIENT
Start: 2019-07-03 | End: 2019-07-29 | Stop reason: ALTCHOICE

## 2019-07-03 RX ADMIN — PANTOPRAZOLE SODIUM 40 MG: 40 TABLET, DELAYED RELEASE ORAL at 17:08

## 2019-07-03 RX ADMIN — DOCUSATE SODIUM 100 MG: 100 CAPSULE, LIQUID FILLED ORAL at 17:08

## 2019-07-03 RX ADMIN — GABAPENTIN 300 MG: 300 CAPSULE ORAL at 16:49

## 2019-07-03 RX ADMIN — METHYLPREDNISOLONE SODIUM SUCCINATE 20 MG: 40 INJECTION, POWDER, FOR SOLUTION INTRAMUSCULAR; INTRAVENOUS at 16:50

## 2019-07-03 RX ADMIN — TAMSULOSIN HYDROCHLORIDE 0.4 MG: 0.4 CAPSULE ORAL at 08:20

## 2019-07-03 RX ADMIN — FINASTERIDE 5 MG: 5 TABLET, FILM COATED ORAL at 08:19

## 2019-07-03 RX ADMIN — IPRATROPIUM BROMIDE AND ALBUTEROL SULFATE 3 ML: 2.5; .5 SOLUTION RESPIRATORY (INHALATION) at 15:29

## 2019-07-03 RX ADMIN — METHYLPREDNISOLONE SODIUM SUCCINATE 20 MG: 40 INJECTION, POWDER, FOR SOLUTION INTRAMUSCULAR; INTRAVENOUS at 08:19

## 2019-07-03 RX ADMIN — DOCUSATE SODIUM 100 MG: 100 CAPSULE, LIQUID FILLED ORAL at 08:19

## 2019-07-03 RX ADMIN — FLUTICASONE FUROATE AND VILANTEROL TRIFENATATE 1 PUFF: 200; 25 POWDER RESPIRATORY (INHALATION) at 08:19

## 2019-07-03 RX ADMIN — IPRATROPIUM BROMIDE AND ALBUTEROL SULFATE 3 ML: 2.5; .5 SOLUTION RESPIRATORY (INHALATION) at 07:19

## 2019-07-03 RX ADMIN — ENOXAPARIN SODIUM 40 MG: 40 INJECTION SUBCUTANEOUS at 08:19

## 2019-07-03 RX ADMIN — POLYETHYLENE GLYCOL 3350 17 G: 17 POWDER, FOR SOLUTION ORAL at 08:28

## 2019-07-03 RX ADMIN — GABAPENTIN 300 MG: 300 CAPSULE ORAL at 08:19

## 2019-07-03 RX ADMIN — PANTOPRAZOLE SODIUM 40 MG: 40 TABLET, DELAYED RELEASE ORAL at 08:19

## 2019-07-03 RX ADMIN — SUCRALFATE 500 MG: 1 SUSPENSION ORAL at 06:21

## 2019-07-03 RX ADMIN — SUCRALFATE 500 MG: 1 SUSPENSION ORAL at 16:49

## 2019-07-03 RX ADMIN — AMLODIPINE BESYLATE 10 MG: 10 TABLET ORAL at 08:19

## 2019-07-03 RX ADMIN — IPRATROPIUM BROMIDE AND ALBUTEROL SULFATE 3 ML: 2.5; .5 SOLUTION RESPIRATORY (INHALATION) at 11:01

## 2019-07-03 NOTE — DISCHARGE SUMMARY
Discharge Summary - Amol Ma 75 y o  male MRN: [id number]    Unit/Bed#: 2 [place] [address] Encounter: [id number]    Admission Date:   Admission Orders (From admission, onward)    Ordered        06/28/19 2209  Inpatient Admission  Once               Admitting Diagnosis: Shortness of breath [R06 02]  COPD exacerbation ([facility] [address] ) [J44 1]  Acute [facility] ([facility] ) [N17 9]    HPI:   As per admission note:   26-year-old male past medical history of COPD, alpha-1 enzyme deficiency, pulmonary emphysema, BPH, hypertension, IBS, history nephrolithiasis, chronic venous insufficiency, basal cell carcinoma, BPH, and arthritis presents with shortness of breath  Patient states shortness of breath exacerbated around 4:00 p m  "I was breathing like a freight train, felt like I was going to pass out at any moment"  Patient states ambulation exacerbates shortness of breath and prescribed inhalers alleviate shortness of breath for short durations of time  Patient denies any chest pain, fever, chills, congestion, rhinorrhea, and sore throat  Patient admits to occasional nonproductive cough  Patient follows with outpatient Pulmonology [facility] q6months  Patient was seen in May by Dr Lloyd Alexandre, history of pulmonary embolism, anticoagulation course completed  Patient also follows with vascular and wound clinic for left lower extremity venous ulcers "the size of a quarter"  Patient lives alone, uses cane to ambulate  Past smoker, pack per day for over 20 years, quit 2 years ago  Patient uses 2 L oxygen at home, sometimes up to 3 as needed  Procedures Performed:   Orders Placed This Encounter   Procedures    ED ECG Documentation Only       Summary of Hospital Course:  Patient given Solu-Medrol to 125 mg IV in the ED and started on duo nebs  Due to the recent history of PE with completed anticoagulation course, the patient had a D-dimer study which was elevated at 950, but CTA was negative    Chest x-ray showed increased interstitial markings in the right upper lobe  Repeat chest x-ray showed some atelectasis and scarring in the right upper lobe  An echo was performed that showed an EF of 60% and  peak PA pressures of 51 mm Hg consistent with his current diagnoses of COPD and emphysema  His Solu-Medrol was tapered down to 60 mg IV Q 8 and then 20 mg IV Q8 for the past 3 days  He was discharged with 1 day of Solu-Medrol 20 mg q 2 IV  During his stay continued to receive DuoNeb scheduled and p r n  as well as Breo Ellipta once daily  Pulmonary was consulted as well  Vital signs continued to be stable and all lab work came back within normal limits  His lung exam and other physical exam continued to remain benign and within normal limits for this patient  Patient showed improvement in breathing and dyspnea but remains dyspneic on exertion very slightly elevated from his baseline at home  He felt comfortable going home with this current respiratory state  Physical therapy recommended home PT  On the last day of admission he received a wound care visit to change the dressing of the venous ulcer on his left ankle secondary to his chronic venous insufficiency  He will be sent home with instructions to continue his home meds as well as a Solu-Medrol taper  The taper will be started July 4th and end July 2015 and will be a taper of prednisone 40 mg once daily for 3 days, 30 mg once daily for 3 days, 20 mg once daily for 3 days, and 10 mg once daily for 3 days  He will follow up at [place] and with pulmonology  * Acute on chronic respiratory failure with hypoxia [facility])  Assessment & Plan  Patient continues to complain of SOB  Slightly more tachypneic from baseline with ambulation to bathroom and back to bed  Therese Pereira He typically can walk around his house, go grocery shopping, take walks, etc, with breaks and mild SOB  He feels better in the past few days and would like to go home      · Procalcitonin negative, pt remains afebrile, WBC WNL  · D dimer 950, suspicion for PE given prior history  Anticoagulation therapy with Dr Starla Ceballos completed prior to admission  · CTA PE study negative for acute process, but did show emphysematous bullae in bases      · CXR: Very mild increased reticular interstitial markings in the right upper lobe, possibly representing very early infiltrate  Repeat CXR showed Discoid atelectasis versus scarring right upper lobe  Similar to findings in the past   · ECHO showed EF of 30%, grade 1 diastolic dysfunction, peak PA pressure 51 mmHG  · Patient on 3 L O2 home oxygen  Currently on 3 L NC-will titrate as needed  · Duo nebs q4h daily and q2h prn and breo ellitpa  · Currently giving Solu-Medrol 20 mg IV q8h, changed to q12 on 7/3  ·  Continue to monitor clinically and follow pulmonology recommendations  · Pulm outpatient recommendations: complete pulmonary function testing and further workup for cause of elevated dyspnea; continueProventil, Trelegy, duo nebulizers,        AAT (alpha-1-antitrypsin) deficiency ([facility] )  Assessment & Plan  · History noted upon admission  · May be contributory to patient's respiratory issues  · Manage as above for respiratory failure  · Patient receives weekly Zemaira at home (due for it on 7/3)    Insomnia  Assessment & Plan  · Zolpidem 10 mg p o  HS p r n , short-term    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 (HCC)  Assessment & Plan  Two venous ulcerations left lower extremity lateral and medial aspect distal left lower extremity  · Follows outpatient with wound care  Patient has an appointment tomorrow-inpatient wound care was called to treat wound in lieu of outpatient visit  · No signs of infection at this time  Chronic venous insufficiency  Assessment & Plan  · Patient with venous ulcers left lower extremity, follows with vascular and wound care outpatient  · Currently stable at this time with no signs of infection  Slightly tender to touch    · Wound care will visit patient on 7/3 to change dressing  Neuropathy  Assessment & Plan  · Continue home medication gabapentin 300 mg p o  T i d  Centrilobular emphysema ([facility] [address] )  Assessment & Plan  · History noted upon admission  Likely contributory to patient's chronic respiratory failure  · Management as above  · Patient taking home Breo Ellipta daily during hospital stay  · Patient follows outpatient with Reji Broderick    Former smoker  Assessment & Plan  · Smoked pack per day or more over 20 years, quit 2 years ago  · Likely cause of patient's COPD/    BPH (benign prostatic hyperplasia)  Assessment & Plan  · Continue Flomax 0 4 mg p o  daily    Essential hypertension  Assessment & Plan  · Continue home medication amlodipine 10 mg p o  daily  · Monitor blood pressures with scheduled vitals    Gastroesophageal reflux disease  Assessment & Plan  · Continue home medications  · Protonix 40 mg p o  b i d  · Carafate 500 mg p o  b i d  A c  CLAYTON (acute kidney injury) (HCC)resolved as of 6/30/2019  Assessment & Plan  Upon admission, creatinine 1 97  Baseline around 1 2-1 3  Patient with history of recent Lasix use for left lower extremity swelling  · Lasix held  · Avoid nephrotoxic agents including home lasix  · Will have to touch base with vascular about discontinuing lasix as he develops CLAYTON every time he takes this medication  · NS 75 cc/hour  · RESOLVED as of 6/30 with creatinine of 9 97        Complications: None    Discharge Diagnosis:  Acute on chronic respiratory failure with hypoxia    Resolved Problems  Date Reviewed: 6/28/2019          Resolved    CLAYTON (acute kidney injury) ([facility] [address] ) 6/30/2019     Resolved by  Maria Luisa De Leon DO          Condition at Discharge: fair         Discharge instructions/Information to patient and family:   See after visit summary for information provided to patient and family        Provisions for Follow-Up Care:  See after visit summary for information related to follow-up care and any pertinent home health orders  PCP: Graeme Tovar MD    Disposition: Home    Planned Readmission: No    Discharge Statement   I spent 30 minutes discharging the patient  This time was spent on the day of discharge  I had direct contact with the patient on the day of discharge  Additional documentation is required if more than 30 minutes were spent on discharge  Discharge Medications:  See after visit summary for reconciled discharge medications provided to patient and family

## 2019-07-03 NOTE — SOCIAL WORK
Per PCP, pt stable for DC to home today  [facility] aware of wound care needs and scheduling pt to be seen upon his DC  No further needs noted

## 2019-07-03 NOTE — PLAN OF CARE
Problem: PAIN - ADULT  Goal: Verbalizes/displays adequate comfort level or baseline comfort level  Description  Interventions:  - Encourage patient to monitor pain and request assistance  - Assess pain using appropriate pain scale  - Administer analgesics based on type and severity of pain and evaluate response  - Implement non-pharmacological measures as appropriate and evaluate response  - Consider cultural and social influences on pain and pain management  - Notify physician/advanced practitioner if interventions unsuccessful or patient reports new pain  Outcome: Progressing     Problem: INFECTION - ADULT  Goal: Absence or prevention of progression during hospitalization  Description  INTERVENTIONS:  - Assess and monitor for signs and symptoms of infection  - Monitor lab/diagnostic results  - Monitor all insertion sites, IV  - [place] appropriate cooling/warming therapies per order  - Administer medications as ordered  - Instruct and encourage patient and family to use good hand hygiene technique  - Identify and instruct in appropriate isolation precautions for identified infection/condition   Outcome: Progressing     Problem: SAFETY ADULT  Goal: Patient will remain free of falls  Description  INTERVENTIONS:  - Assess patient frequently for physical needs  -  Identify cognitive and physical deficits and behaviors that affect risk of falls    -  [place] fall precautions as indicated by assessment   - Educate patient/family on patient safety including physical limitations  - Instruct patient to call for assistance with activity based on assessment  - Modify environment to reduce risk of injury  - Consider OT/PT consult to assist with strengthening/mobility  Outcome: Progressing     Problem: DISCHARGE PLANNING  Goal: Discharge to home or other facility with appropriate resources  Description  INTERVENTIONS:  - Identify barriers to discharge w/patient and caregiver  - Arrange for needed discharge resources and transportation as appropriate  - Identify discharge learning needs (meds, wound care, etc )  - Arrange for interpretive services to assist at discharge as needed  - Refer to Case Management Department for coordinating discharge planning if the patient needs post-hospital services based on physician/advanced practitioner order or complex needs related to functional status, cognitive ability, or social support system  Outcome: Progressing     Problem: RESPIRATORY - ADULT  Goal: Achieves optimal ventilation and oxygenation  Description  INTERVENTIONS:  - Assess for changes in respiratory status  - Assess for changes in mentation and behavior  - Position to facilitate oxygenation and minimize respiratory effort  - Oxygen administration by appropriate delivery method based on oxygen saturation (per order) or ABGs  - Encourage broncho-pulmonary hygiene including cough, deep breathe, Incentive Spirometry  - Assess the need for suctioning and aspirate as needed  - Assess and instruct to report SOB or any respiratory difficulty  - Respiratory Therapy support as indicated   Outcome: Progressing     Problem: Potential for Falls  Goal: Patient will remain free of falls  Description  INTERVENTIONS:  - Assess patient frequently for physical needs  -  Identify cognitive and physical deficits and behaviors that affect risk of falls    -  [place] fall precautions as indicated by assessment   - Educate patient/family on patient safety including physical limitations  - Instruct patient to call for assistance with activity based on assessment  - Modify environment to reduce risk of injury  - Consider OT/PT consult to assist with strengthening/mobility  Outcome: Progressing

## 2019-07-03 NOTE — DISCHARGE INSTRUCTIONS
Dyspnea   WHAT YOU NEED TO KNOW:   Dyspnea is breathing difficulty or discomfort  You may have labored, painful, or shallow breathing  You may feel breathless or short of breath  Dyspnea can occur during rest or with activity  You may have dyspnea for a short time, or it might become chronic  Dyspnea is often a symptom of a disease or condition  DISCHARGE INSTRUCTIONS:   Return to the emergency department if:   · Your signs and symptoms are the same or worse within 24 hours of treatment  · You have shaking chills or a fever over 102°F      · You have new pain, pressure, or tightness in your chest      · You have a new or worse cough or wheezing, or you cough up blood  · You feel like you cannot get enough air  · The skin over your ribs or on your neck sinks in when you breathe  · You have a severe headache with vomiting and abdominal pain  · You feel confused or dizzy  Contact your healthcare provider or specialist if:   · You have questions or concerns about your condition or care  Medicines:   · Medicines  may be used to treat the cause of your dyspnea  Medicines may reduce swelling in your airway or decrease extra fluid from around your heart or lungs  Other medicines may be used to decrease anxiety and help you feel calm and relaxed  · Take your medicine as directed  Contact your healthcare provider if you think your medicine is not helping or if you have side effects  Tell him or her if you are allergic to any medicine  Keep a list of the medicines, vitamins, and herbs you take  Include the amounts, and when and why you take them  Bring the list or the pill bottles to follow-up visits  Carry your medicine list with you in case of an emergency  Manage long-term dyspnea:   · Create an action plan  You and your healthcare provider can work together to create a plan for how to handle episodes of dyspnea   The plan can include daily activities, treatment changes, and what to do if you have severe breathing problems  · Lean forward on your elbows when you sit  This helps your lungs expand and may make it easier to breathe  · Use pursed-lip breathing any time you feel short of breath  Breathe in through your nose and then slowly breathe out through your mouth with your lips slightly puckered  It should take you twice as long to breathe out as it did to breathe in  · Do not smoke  Nicotine and other chemicals in cigarettes and cigars can cause lung damage and make it harder to breathe  Ask your healthcare provider for information if you currently smoke and need help to quit  E-cigarettes or smokeless tobacco still contain nicotine  Talk to your healthcare provider before you use these products  · Reach or maintain a healthy weight  Your healthcare provider can help you create a safe weight loss plan if you are overweight  · Exercise as directed  Exercise can help your lungs work more easily  Exercise can also help you lose weight if needed  Try to get at least 30 minutes of exercise most days of the week  Your healthcare provider can help you create an exercise plan that is safe for you  Follow up with your healthcare provider or specialist as directed:  Write down your questions so you remember to ask them during your visits  © 2017 [employer]  Information is for End User's use only and may not be sold, redistributed or otherwise used for commercial purposes  All illustrations and images included in CareNotes® are the copyrighted property of [employer]  or Tyrone Walsh  The above information is an  only  It is not intended as medical advice for individual conditions or treatments  Talk to your doctor, nurse or pharmacist before following any medical regimen to see if it is safe and effective for you

## 2019-07-03 NOTE — PHYSICAL THERAPY NOTE
PT TREATMENT     07/03/19 1218   Pain Assessment   Pain Assessment No/denies pain   Restrictions/Precautions   Other Precautions O2;Fall Risk   General   Chart Reviewed Yes   Family/Caregiver Present No   Cognition   Overall Cognitive Status WFL   Arousal/Participation Cooperative   Orientation Level Oriented X4   Following Commands Follows all commands and directions without difficulty   Subjective   Subjective States that he knows the O2 levels are WFLs, however he still has SOB   Bed Mobility   Supine to Sit 7  Independent   Additional Comments sitting at edge of bed at end of session with tray table with lunch in front   Transfers   Sit to Stand 5  Supervision   Stand to Sit 5  Supervision   Stand pivot 5  Supervision   Ambulation/Elevation   Gait pattern   (slow brad, flexed posture with cane)   Gait Assistance 5  Supervision   Additional items   (O2 at 3L)   Assistive Device Straight cane   Distance 75 feet x 2   Balance   Ambulatory Fair   Activity Tolerance   Activity Tolerance Patient limited by fatigue  (O2 sats lowest: 93% , remained in 90's throughtout walk)   Exercises   Hip Flexion Sitting;20 reps;Bilateral   Knee AROM Long Arc Quad Sitting;20 reps;Bilateral   Ankle Pumps Sitting;20 reps;Bilateral  (heel toe raises)   Assessment   Assessment Pt needed a standing rest at nursing home point during walking  O2 sats remained in 90's , however pt still exhibits some labored breathing  Instructed to breathe through his nose vs his mouth  Pt able to do same  Pt will benefit from continued PT      Goals   Patient Goals breathe better with activity   Plan   Treatment/Interventions Functional transfer training; Therapeutic exercise; Endurance training;Patient/family training;Bed mobility;Gait training;Spoke to nursing;OT   Progress Progressing toward goals   Recommendation   Recommendation Home PT   Equipment Recommended   (has home O2)   Licensure   [place] License Number  Gunnar Sandoval COLMENARES  [id number]

## 2019-07-03 NOTE — CONSULTS
Consult Note - Wound   Oliviaa Notice 75 y o  male MRN: [id number]  Unit/Bed#: 2 [place] [address] Encounter: [id number]      Assessment:   Chronic left lower legs wounds  Pt  Goes to the outpatient wound center  Pt  Said he just saw a vascular surgeon and he wants to perform surgery "on the veins"  Pt  Unable to explain in detail the procedure  Left lower leg, lateral  Healing wound  White, dry tissue base  No drainage  No odor  Periwound without redness however patient flinches when I touch the lateral ankle area  Left lower leg, medial area  Healing wound  Intact skin is raised and hardened  Periwound intact, healed re-epithelialized skin, dry and scaly and peeling skin  Not as painful on this side as the lateral side  Hyperkeratotic skin islands between open dermal wounds  Treatment:  1  Left lower leg wounds cleansed with normal saline  Patted dry  Covered with Allevyn bordered foam dressings  One dressing on lateral aspect and one dressing on left medial side  Pain when I touched the left medial ankle  2  Prior to dressing application, I cleansed bilat lower legs and moisturized with Hydraguard  Socks re-applied  Wound/Skin Care Plan:   1  For bilat lower leg wounds: cleanse with normal saline  Pat dry  Cover with bordered foam dressings  Change every 3 days and prn    2  On intact skin on lower legs, apply hydraguard lotion on days when wound care is performed  Do not place between toes  3  Moisturize skin each day  4  Remind patient to frequently reposition in bed  5  Wound care nurse follow up  6  Patient will follow-up with outpatient wound care center      Vitals: Blood pressure 123/64, pulse 72, temperature 98 1 °F (36 7 °C), temperature source Oral, resp  rate 19, height 6' 5" (1 956 m), weight 85 kg (187 lb 6 3 oz), SpO2 96 %  ,Body mass index is 22 22 kg/m²  Wound 06/28/19 Venous stasis ulcer Ankle Left;Medial (Active)   Wound Image   7/3/2019  3:25 PM   Wound Description Intact; Pink 7/3/2019  3:25 PM   Brenda-wound Assessment Intact;Dry 7/3/2019  3:25 PM   Wound Length (cm) 6 5 cm 7/3/2019  3:25 PM   Wound Width (cm) 5 cm 7/3/2019  3:25 PM   Wound Depth (cm) 0 1 7/3/2019  3:25 PM   Calculated Wound Area (cm^2) 32 5 cm^2 7/3/2019  3:25 PM   Calculated Wound Volume (cm^3) 3 25 cm^3 7/3/2019  3:25 PM   Tunneling 0 cm 7/3/2019  3:25 PM   Undermining 0 7/3/2019  3:25 PM   Drainage Amount Scant 7/3/2019  3:25 PM   Drainage Description Serosanguineous 7/3/2019  3:25 PM   Treatments Cleansed 7/3/2019  3:25 PM   Dressing Foam, Silicon (eg  Allevyn, etc) 7/3/2019  3:25 PM   Wound packed? No 7/3/2019  3:25 PM   Dressing Changed Changed 7/3/2019  3:25 PM   Patient Tolerance Tolerated well 6/30/2019  8:30 PM   Dressing Status Clean;Dry; Intact 7/3/2019  8:14 AM       Wound 06/28/19 Venous stasis ulcer Ankle Left;Lateral (Active)   Wound Description Dry; Other (Comment) 7/3/2019  3:25 PM   Brenda-wound Assessment Intact;Dry; Other (Comment) 7/3/2019  3:25 PM   Wound Length (cm) 1 2 cm 7/3/2019  3:25 PM   Wound Width (cm) 0 8 cm 7/3/2019  3:25 PM   Wound Depth (cm) 0 1 7/3/2019  3:25 PM   Calculated Wound Area (cm^2) 0 96 cm^2 7/3/2019  3:25 PM   Calculated Wound Volume (cm^3) 0 1 cm^3 7/3/2019  3:25 PM   Tunneling 0 cm 7/3/2019  3:25 PM   Undermining 0 7/3/2019  3:25 PM   Drainage Amount None 7/3/2019  3:25 PM   Non-staged Wound Description Partial thickness 7/3/2019  3:25 PM   Treatments Cleansed 7/3/2019  3:25 PM   Dressing Foam, Silicon (eg  Allevyn, etc) 7/3/2019  3:25 PM   Wound packed? No 7/3/2019  3:25 PM   Dressing Changed Changed 7/3/2019  3:25 PM   Patient Tolerance Tolerated well 6/30/2019  8:30 PM   Dressing Status Clean;Dry; Intact 7/3/2019  8:14 AM

## 2019-07-03 NOTE — DISCHARGE INSTR - OTHER ORDERS
Wound/Skin Care Discharge Plan:   1  For the two bilat lower leg wounds- one medial aspect and one lateral aspectr: cleanse with normal saline  Pat dry  Cover each wound with a bordered foam dressing  Change every 3 days and prn  Once drainage has decreased or stopped, may apply island dressing  Change every 3 days and prn    2  On intact skin on lower legs, apply hydraguard lotion or similar moisturizing lotion on days when wound care is performed  Do not place between toes  3  Moisturize skin each day  4  Remind patient to frequently reposition in bed  5   Patient will follow-up with outpatient wound care center

## 2019-07-03 NOTE — TELEPHONE ENCOUNTER
Spoke with pt to schedule surgery on 7-11-19 but pt is admitted in hospital now, he will call us back once he is back home to schedule  SJ

## 2019-07-03 NOTE — PLAN OF CARE
Problem: PAIN - ADULT  Goal: Verbalizes/displays adequate comfort level or baseline comfort level  Description  Interventions:  - Encourage patient to monitor pain and request assistance  - Assess pain using appropriate pain scale  - Administer analgesics based on type and severity of pain and evaluate response  - Implement non-pharmacological measures as appropriate and evaluate response  - Consider cultural and social influences on pain and pain management  - Notify physician/advanced practitioner if interventions unsuccessful or patient reports new pain  7/3/2019 1657 by Sophia Joshi RN  Outcome: Completed  7/3/2019 1022 by Sophia Joshi RN  Outcome: Progressing     Problem: INFECTION - ADULT  Goal: Absence or prevention of progression during hospitalization  Description  INTERVENTIONS:  - Assess and monitor for signs and symptoms of infection  - Monitor lab/diagnostic results  - Monitor all insertion sites, IV  - [place] appropriate cooling/warming therapies per order  - Administer medications as ordered  - Instruct and encourage patient and family to use good hand hygiene technique  - Identify and instruct in appropriate isolation precautions for identified infection/condition   7/3/2019 1657 by Sophia Joshi RN  Outcome: Completed  7/3/2019 1022 by Sophia Joshi RN  Outcome: Progressing     Problem: SAFETY ADULT  Goal: Patient will remain free of falls  Description  INTERVENTIONS:  - Assess patient frequently for physical needs  -  Identify cognitive and physical deficits and behaviors that affect risk of falls    -  [place] fall precautions as indicated by assessment   - Educate patient/family on patient safety including physical limitations  - Instruct patient to call for assistance with activity based on assessment  - Modify environment to reduce risk of injury  - Consider OT/PT consult to assist with strengthening/mobility  7/3/2019 1657 by Sophia Joshi RN  Outcome: Completed  7/3/2019 1022 by Sylvia Ortiz RN  Outcome: Progressing     Problem: DISCHARGE PLANNING  Goal: Discharge to home or other facility with appropriate resources  Description  INTERVENTIONS:  - Identify barriers to discharge w/patient and caregiver  - Arrange for needed discharge resources and transportation as appropriate  - Identify discharge learning needs (meds, wound care, etc )  - Arrange for interpretive services to assist at discharge as needed  - Refer to Case Management Department for coordinating discharge planning if the patient needs post-hospital services based on physician/advanced practitioner order or complex needs related to functional status, cognitive ability, or social support system  7/3/2019 1657 by Sylvia Ortiz RN  Outcome: Completed  7/3/2019 1022 by Sylvia Ortiz RN  Outcome: Progressing     Problem: RESPIRATORY - ADULT  Goal: Achieves optimal ventilation and oxygenation  Description  INTERVENTIONS:  - Assess for changes in respiratory status  - Assess for changes in mentation and behavior  - Position to facilitate oxygenation and minimize respiratory effort  - Oxygen administration by appropriate delivery method based on oxygen saturation (per order) or ABGs  - Encourage broncho-pulmonary hygiene including cough, deep breathe, Incentive Spirometry  - Assess the need for suctioning and aspirate as needed  - Assess and instruct to report SOB or any respiratory difficulty  - Respiratory Therapy support as indicated   7/3/2019 1657 by Sylvia Ortiz RN  Outcome: Completed  7/3/2019 1022 by Sylvia Ortiz RN  Outcome: Progressing     Problem: Potential for Falls  Goal: Patient will remain free of falls  Description  INTERVENTIONS:  - Assess patient frequently for physical needs  -  Identify cognitive and physical deficits and behaviors that affect risk of falls    -  [place] fall precautions as indicated by assessment   - Educate patient/family on patient safety including physical limitations  - Instruct patient to call for assistance with activity based on assessment  - Modify environment to reduce risk of injury  - Consider OT/PT consult to assist with strengthening/mobility  7/3/2019 1657 by Paolo Causey RN  Outcome: Completed  7/3/2019 1022 by Paolo Causey RN  Outcome: Progressing

## 2019-07-03 NOTE — PLAN OF CARE
Problem: PAIN - ADULT  Goal: Verbalizes/displays adequate comfort level or baseline comfort level  Description  Interventions:  - Encourage patient to monitor pain and request assistance  - Assess pain using appropriate pain scale  - Administer analgesics based on type and severity of pain and evaluate response  - Implement non-pharmacological measures as appropriate and evaluate response  - Consider cultural and social influences on pain and pain management  - Notify physician/advanced practitioner if interventions unsuccessful or patient reports new pain  Outcome: Progressing     Problem: INFECTION - ADULT  Goal: Absence or prevention of progression during hospitalization  Description  INTERVENTIONS:  - Assess and monitor for signs and symptoms of infection  - Monitor lab/diagnostic results  - Monitor all insertion sites, IV  - [place] appropriate cooling/warming therapies per order  - Administer medications as ordered  - Instruct and encourage patient and family to use good hand hygiene technique  - Identify and instruct in appropriate isolation precautions for identified infection/condition   Outcome: Progressing     Problem: SAFETY ADULT  Goal: Patient will remain free of falls  Description  INTERVENTIONS:  - Assess patient frequently for physical needs  -  Identify cognitive and physical deficits and behaviors that affect risk of falls    -  [place] fall precautions as indicated by assessment   - Educate patient/family on patient safety including physical limitations  - Instruct patient to call for assistance with activity based on assessment  - Modify environment to reduce risk of injury  - Consider OT/PT consult to assist with strengthening/mobility  Outcome: Progressing     Problem: DISCHARGE PLANNING  Goal: Discharge to home or other facility with appropriate resources  Description  INTERVENTIONS:  - Identify barriers to discharge w/patient and caregiver  - Arrange for needed discharge resources and transportation as appropriate  - Identify discharge learning needs (meds, wound care, etc )  - Arrange for interpretive services to assist at discharge as needed  - Refer to Case Management Department for coordinating discharge planning if the patient needs post-hospital services based on physician/advanced practitioner order or complex needs related to functional status, cognitive ability, or social support system  Outcome: Progressing     Problem: RESPIRATORY - ADULT  Goal: Achieves optimal ventilation and oxygenation  Description  INTERVENTIONS:  - Assess for changes in respiratory status  - Assess for changes in mentation and behavior  - Position to facilitate oxygenation and minimize respiratory effort  - Oxygen administration by appropriate delivery method based on oxygen saturation (per order) or ABGs  - Initiate smoking cessation education as indicated  - Encourage broncho-pulmonary hygiene including cough, deep breathe, Incentive Spirometry  - Assess the need for suctioning and aspirate as needed  - Assess and instruct to report SOB or any respiratory difficulty  - Respiratory Therapy support as indicated  Outcome: Progressing     Problem: Potential for Falls  Goal: Patient will remain free of falls  Description  INTERVENTIONS:  - Assess patient frequently for physical needs  -  Identify cognitive and physical deficits and behaviors that affect risk of falls    -  [place] fall precautions as indicated by assessment   - Educate patient/family on patient safety including physical limitations  - Instruct patient to call for assistance with activity based on assessment  - Modify environment to reduce risk of injury  - Consider OT/PT consult to assist with strengthening/mobility  Outcome: Progressing

## 2019-07-03 NOTE — DISCHARGE INSTR - AVS FIRST PAGE
Please take the Predisone 10 mg tablet taper as explained to you and written in the printed calendar  Follow up with Pulmonogy and [facility] within 2 weeks  Wound/Skin Care Plan (for VNA services):   1  For bilat lower leg wounds: cleanse with normal saline  Pat dry  Cover with bordered foam dressings  Change every 3 days and prn    2  On intact skin on lower legs, apply hydraguard lotion on days when wound care is performed  Do not place between toes  3  Moisturize skin each day  4  Remind patient to frequently reposition in bed  5  Wound care nurse follow up  6   Patient will follow-up with outpatient wound care center

## 2019-07-03 NOTE — NURSING NOTE
Discharge instructions discussed with patient, all questions answered  Patient left 2S stable, all personal belongings at side

## 2019-07-03 NOTE — PROGRESS NOTES
Progress Note - Pulmonary   Adelina Darin 75 y o  male MRN: [id number]  Unit/Bed#: 2 [place] [address] Encounter: [id number]  50-year-old male with a history of severe emphysematous COPD with alpha 1 antitrypsin deficiency with replacements at home weekly on Wednesdays, with an FEV1 of 1 6 L or 40% of predicted in March 2018, history of remote pulmonary embolus previously treated with anticoagulation for 6 months, but currently now off anticoagulation, presented to the emergency department with acute on chronic dyspnea ongoing for approximately 2 weeks and acutely worsening of Friday      In-hospital being treated for acute exacerbation of COPD  CT PE study was negative for acute PE has chronic left-sided small pulmonary embolus  Also had bilateral lower extremity swelling ongoing for approximately 6 weeks and left lower extremity ulcers  Venous duplex performed and negative  Patient progressive dyspnea which is now improved  He was treated with a course of steroids and is stable in his chronic 2 L nasal cannula oxygen  Assessment/Plan:     Dyspnea: Improved  Returned to baseline    Very severe COPD/emphysema with secondary Chronic hypoxemic respiratory failure with chronic oxygen dependence on 3 L:  -FEV1 1 16 L or 40% predicted  -chronically on 3 L nasal cannula oxygen at home  -currently stable 96% on 3 L  -patient is stable for discharge  -discharge home with nebulizer medication, as prednisone 40, 30, 20, 10 x 3 days each,  -normally at home is on Trelegy, duo nebs, Proventil  -has home nebulizer  -patient follow up in the outpatient setting with [facility]  Alpha-1 antitrypsin deficiency:  -patient gets weekly alpha-1 replacement every Wednesday at home          ______________________________________________________________________    Subjective: Pt seen and examined at bedside  No new complaints  Patient feels stable to go home      Tele Events:     Vitals:   Temp:  [97 9 °F (36 6 °C)-98 2 °F (36 8 °C)] 98 2 °F (36 8 °C)  HR:  [69-76] 76  Resp:  [20] 20  BP: (136-153)/(71-87) 153/87  Weight (last 2 days)     None        Oxygen Therapy  SpO2: 99 %  O2 Flow Rate (L/min): 3 L/min    IV Infusions:       Nutrition:        Diet Orders   (From admission, onward)            Start     Ordered    06/30/19 0831  Room Service  Once     Question:  Type of Service  Answer:  Room Service-Appropriate    06/30/19 0830    06/28/19 2235  Diet Regular; Regular House  Diet effective now     Question Answer Comment   Diet Type Regular    Regular Regular House    RD to adjust diet per protocol? Yes        06/28/19 2234          Ins/Outs:   I/O       07/01 0701 - 07/02 0700 07/02 0701 - 07/03 0700 07/03 0701 - 07/04 0700    P  O   240     Total Intake(mL/kg)  240 (2 8)     Urine (mL/kg/hr) 800 (0 4) 300 (0 1)     Total Output 800 300     Net -800 -60                  Lines/Drains:  Invasive Devices     Peripheral Intravenous Line            Peripheral IV 06/28/19 Left Forearm 4 days                 Active medications:  Scheduled Meds:  Current Facility-Administered Medications:  amLODIPine 10 mg Oral Daily Favio Tejeda MD   docusate sodium 100 mg Oral BID Asaf Sanchez MD   enoxaparin 40 mg Subcutaneous Q24H [address] Swetha [place], DO   finasteride 5 mg Oral QAM Favio Tejeda MD   fluticasone-vilanterol 1 puff Inhalation Daily Seymour Schafer PA-C   gabapentin 300 mg Oral TID Favio Tejeda MD   ipratropium-albuterol 3 mL Nebulization Q4H Favio Tejeda MD   ipratropium-albuterol 3 mL Nebulization Q2H PRN Favio Tejeda MD   methylPREDNISolone sodium succinate 20 mg Intravenous Q12H Ebonie Hester MD   ondansetron 4 mg Intravenous Q6H PRN Blaire Lucas MD   pantoprazole 40 mg Oral BID Favio Tejeda MD   polyethylene glycol 17 g Oral Daily PRN Bob Mabry,    sucralfate 500 mg Oral BID AC Favio Tejeda MD   tamsulosin 0 4 mg Oral Daily Favio Tejeda MD   zolpidem 10 mg Oral HS PRN Favio Tejeda MD     PRN Meds:  ipratropium-albuterol 3 mL Q2H PRN   ondansetron 4 mg Q6H PRN   polyethylene glycol 17 g Daily PRN   zolpidem 10 mg HS PRN     ____________________________________________________________________      Physical Exam   Constitutional: He is oriented to person, place, and time  He appears well-developed  HENT:   Head: Normocephalic and atraumatic  Eyes: Pupils are equal, round, and reactive to light  Conjunctivae are normal    Neck: Normal range of motion  Neck supple  Cardiovascular: Normal rate, regular rhythm and normal heart sounds  Exam reveals no gallop and no friction rub  No murmur heard  Pulmonary/Chest: Effort normal  No accessory muscle usage  No tachypnea  No respiratory distress  He has decreased breath sounds in the right upper field, the right middle field, the right lower field, the left upper field, the left middle field and the left lower field  He has no wheezes  He has no rhonchi  He has no rales  He exhibits no tenderness  96% currently on 3 L nasal cannula    Not dyspneic today   Abdominal: Soft  Bowel sounds are normal    Musculoskeletal: Normal range of motion  He exhibits no edema  Neurological: He is alert and oriented to person, place, and time  Skin: Skin is warm and dry     Psychiatric: He has a normal mood and affect            ____________________________________________________________________    Labs:   CBC: Results from last 7 days   Lab Units 07/03/19  0516 07/02/19  0450 07/01/19  0510   WBC Thousand/uL 10 77* 8 76 9 05   HEMOGLOBIN g/dL 12 6 12 7 12 1   HEMATOCRIT % 37 6 37 9 36 4*   MCV fL 97 97 97   PLATELETS Thousands/uL 184 188 182     CMP: Results from last 7 days   Lab Units 07/03/19  0516 07/02/19  0450 07/01/19  0510  06/28/19  1732   POTASSIUM mmol/L 3 7 4 4 4 1   < > 3 9   CHLORIDE mmol/L 105 105 105   < > 103   CO2 mmol/L 29 28 25   < > 23   BUN mg/dL 24 17 16   < > 17   CREATININE mg/dL 1 09 1 10 1 01   < > 1 97*   CALCIUM mg/dL 8 4 8 8 8 8   < > 9 0 AST U/L  --   --   --   --  23   ALT U/L  --   --   --   --  33   ALK PHOS U/L  --   --   --   --  82   EGFR ml/min/1 73sq m 66 65 72   < > 32    < > = values in this interval not displayed  No components found for: ABG    Magnesium:   Results from last 7 days   Lab Units 07/03/19  0516   MAGNESIUM mg/dL 2 3     Phosphorous:   Results from last 7 days   Lab Units 07/03/19  0516   PHOSPHORUS mg/dL 2 9     Troponin:   Results from last 7 days   Lab Units 06/28/19  1732   TROPONIN I ng/mL <0 02     PT/INR:   Results from last 7 days   Lab Units 06/28/19  1732   PTT seconds 25   INR  1 01     Lactic Acid:     BNP:   Results from last 7 days   Lab Units 06/28/19  1732   NT-PRO BNP pg/mL 182     TSH:     Procalcitonin: Invalid input(s): PROCALCITONIN                Imaging:   XR chest pa & lateral   Final Result by Segundo 6, DO (07/02 1157)      Discoid atelectasis versus scarring right upper lobe  Workstation performed: [id number]         CTA chest pe study   Final Result by Mayur Mccabe DO (06/30 6669)   1  No acute pulmonary emboli  2   Stable soft tissue in the periphery of the bifurcation of the left lower lobe pulmonary artery vessels  This appears extrinsic to the vessel proper  Although the findings may represent chronic PE, confluent adenopathy in the left hilum not    excluded  As stated, this is unchanged from the prior study   3  Stable emphysematous changes  4   Hiatal hernia with reflux into the distal esophagus and thickening of the distal esophagus  Recommend follow-up upper endoscopy  Workstation performed: [id number]         XR chest 1 view portable   Final Result by Cain Barker MD (06/29 5218)      Mild relative increased reticular interstitial markings in the right upper lobe  Workstation performed: [id number]               Micro: Lab Results   Component Value Date    BLOODCX No Growth After 5 Days   12/29/2017    BLOODCX No Growth After 5 Days   12/29/2017    WOUNDCULT 3+ Growth of Staphylococcus aureus (A) 06/12/2019    MRSACULTURE  06/28/2019     No Methicillin Resistant Staphlyococcus aureus (MRSA) isolated            Invalid input(s): LEGIONELLAURINARYANTIGEN        Code Status: Level 1 - Full Code

## 2019-07-05 ENCOUNTER — TRANSITIONAL CARE MANAGEMENT (OUTPATIENT)
Dept: FAMILY MEDICINE CLINIC | Facility: CLINIC | Age: 74
End: 2019-07-05

## 2019-07-09 ENCOUNTER — TELEPHONE (OUTPATIENT)
Dept: FAMILY MEDICINE CLINIC | Facility: CLINIC | Age: 74
End: 2019-07-09

## 2019-07-09 ENCOUNTER — OFFICE VISIT (OUTPATIENT)
Dept: FAMILY MEDICINE CLINIC | Facility: CLINIC | Age: 74
End: 2019-07-09
Payer: MEDICARE

## 2019-07-09 VITALS
DIASTOLIC BLOOD PRESSURE: 60 MMHG | SYSTOLIC BLOOD PRESSURE: 158 MMHG | BODY MASS INDEX: 22.51 KG/M2 | WEIGHT: 189.8 LBS | TEMPERATURE: 98 F | OXYGEN SATURATION: 96 % | HEART RATE: 84 BPM

## 2019-07-09 DIAGNOSIS — K21.9 GASTROESOPHAGEAL REFLUX DISEASE WITHOUT ESOPHAGITIS: ICD-10-CM

## 2019-07-09 DIAGNOSIS — I10 ESSENTIAL HYPERTENSION: ICD-10-CM

## 2019-07-09 DIAGNOSIS — R26.89 IMBALANCE: ICD-10-CM

## 2019-07-09 DIAGNOSIS — G62.9 NEUROPATHY: Primary | ICD-10-CM

## 2019-07-09 PROCEDURE — 99495 TRANSJ CARE MGMT MOD F2F 14D: CPT | Performed by: FAMILY MEDICINE

## 2019-07-09 RX ORDER — RANITIDINE 150 MG/1
150 TABLET ORAL DAILY
Qty: 30 TABLET | Refills: 3 | Status: SHIPPED | OUTPATIENT
Start: 2019-07-09 | End: 2019-12-09 | Stop reason: HOSPADM

## 2019-07-09 NOTE — PROGRESS NOTES
Assessment/Plan:    No problem-specific Assessment & Plan notes found for this encounter  Diagnoses and all orders for this visit:    Neuropathy  -     CBC and differential; Future  -     Comprehensive metabolic panel; Future  -     TSH, 3rd generation; Future  -     Vitamin B12; Future  -     Folate; Future    Gastroesophageal reflux disease without esophagitis  -     ranitidine (ZANTAC) 150 mg tablet; Take 1 tablet (150 mg total) by mouth daily    Imbalance  -     Ambulatory referral to Physical Therapy; Future    Essential hypertension  Comments:  If elevated at next visit consider adding another medication          Subjective:      Patient ID: Kyle Alex is a 75 y o  male  HPI 75year old male with PMHx of alpha 1 antitrypsin deficiency and COPD on 3L oxygen, GERD, HTN, [place], CHF with diastolic dysfunction, presents as [facility] after hospitalization for COPD exacerbation from 6/28-07/03  Breathing is improved since then, no wheezing, cough, or sob  Patient is currently on Prednisone taper tid  Since discharge from the hospital, patient has felt constant numbness and tingling in bilateral hands and feet with worsening balance  He has stopped taking cholestyramine due to nausea and endorses 2-3 episodes of diarrhea daily  GERD symptoms have not improved despite sucralfate and pantoprazole  The following portions of the patient's history were reviewed and updated as appropriate: allergies, current medications, past family history, past medical history, past social history, past surgical history and problem list     Review of Systems    Gen: Fatigue  No weight loss, fevers or chills  HEENT: Sees flashing lights in R eye [id number], no other visual changes  Resp: No wheezing, sob, cough  CV: No palpitations or chest pain  GI: Diarrhea  No nausea or vomiting  : Inc urinary frequency, urgency without hematuria or dysuria  Skin: Ecchymoses on forearms, non painful  Neuro:  Loss of balance, numbness/tingling in hands and feet, lightheadedness, no dizziness  Objective:      /60 (BP Location: Left arm, Patient Position: Sitting, Cuff Size: Adult)   Pulse 84   Temp 98 °F (36 7 °C) (Tympanic)   Wt 86 1 kg (189 lb 12 8 oz)   SpO2 96% Comment: on 3L of oxygen  BMI 22 51 kg/m²          Physical Exam    Gen: NAD, well appearing, alert  RESP: Faint crackles in lower lung fields bilaterally  No rhonchi, wheezes or rales  CV: RRR, no murmurs, rubs or gallops  Abd: No tenderness to palpation  Soft, non-distended  Ext: Radial pulses intact  5/5 strength in upper extremities and RLE, 4/5 strength in LLE  No pitting edema  Neuro: positive Romberg exam, patient immediately fell to L side with eyes closed with lightheadedness after  Gait normal  Patellar reflex symmetrical bilaterally  Skin: Scattered ecchymoses across forearms, no tenderness to palpation

## 2019-07-09 NOTE — TELEPHONE ENCOUNTER
Pt needs an appt with you in 2 to 3 weeks,you are booked till  3rd week of august can you squeeze him in?

## 2019-07-10 ENCOUNTER — APPOINTMENT (OUTPATIENT)
Dept: WOUND CARE | Facility: HOSPITAL | Age: 74
End: 2019-07-10
Payer: MEDICARE

## 2019-07-10 PROCEDURE — 97597 DBRDMT OPN WND 1ST 20 CM/<: CPT

## 2019-07-10 NOTE — TELEPHONE ENCOUNTER
Spoke with pt to schedule EVLT on 8-8-19  Pt told his pulmonologist advised him to wait for surgery  He was admitted in hospital for COPD  He will give us a call once his dr ok him for surgery  SJ

## 2019-07-10 NOTE — TELEPHONE ENCOUNTER
DR Rhonda LONG FROM [facility] VISITING [facility]URSES IS CALLING TO LET YOU KNOW THAT SHE WILL BE SEEING PT AT HOME A COUPLE TIMES A WEEK FOR THE NEXT COUPLE WEEKS  FOR HOME CARE PT

## 2019-07-17 ENCOUNTER — APPOINTMENT (OUTPATIENT)
Dept: WOUND CARE | Facility: HOSPITAL | Age: 74
End: 2019-07-17
Payer: MEDICARE

## 2019-07-17 ENCOUNTER — TRANSCRIBE ORDERS (OUTPATIENT)
Dept: ADMINISTRATIVE | Facility: HOSPITAL | Age: 74
End: 2019-07-17

## 2019-07-17 ENCOUNTER — APPOINTMENT (OUTPATIENT)
Dept: LAB | Facility: HOSPITAL | Age: 74
End: 2019-07-17
Attending: FAMILY MEDICINE
Payer: MEDICARE

## 2019-07-17 DIAGNOSIS — K13.0 ANGULAR CHEILITIS: Primary | ICD-10-CM

## 2019-07-17 DIAGNOSIS — G62.9 NEUROPATHY: ICD-10-CM

## 2019-07-17 LAB
ALBUMIN SERPL BCP-MCNC: 3.6 G/DL (ref 3.5–5)
ALP SERPL-CCNC: 64 U/L (ref 46–116)
ALT SERPL W P-5'-P-CCNC: 85 U/L (ref 12–78)
ANION GAP SERPL CALCULATED.3IONS-SCNC: 9 MMOL/L (ref 4–13)
AST SERPL W P-5'-P-CCNC: 25 U/L (ref 5–45)
BASOPHILS # BLD AUTO: 0.05 THOUSANDS/ΜL (ref 0–0.1)
BASOPHILS NFR BLD AUTO: 1 % (ref 0–1)
BILIRUB SERPL-MCNC: 1.1 MG/DL (ref 0.2–1)
BUN SERPL-MCNC: 14 MG/DL (ref 5–25)
CALCIUM SERPL-MCNC: 8.4 MG/DL (ref 8.3–10.1)
CHLORIDE SERPL-SCNC: 101 MMOL/L (ref 100–108)
CO2 SERPL-SCNC: 28 MMOL/L (ref 21–32)
CREAT SERPL-MCNC: 1.1 MG/DL (ref 0.6–1.3)
EOSINOPHIL # BLD AUTO: 0.32 THOUSAND/ΜL (ref 0–0.61)
EOSINOPHIL NFR BLD AUTO: 4 % (ref 0–6)
ERYTHROCYTE [DISTWIDTH] IN BLOOD BY AUTOMATED COUNT: 13.6 % (ref 11.6–15.1)
FOLATE SERPL-MCNC: 13.1 NG/ML (ref 3.1–17.5)
GFR SERPL CREATININE-BSD FRML MDRD: 65 ML/MIN/1.73SQ M
GLUCOSE P FAST SERPL-MCNC: 100 MG/DL (ref 65–99)
HCT VFR BLD AUTO: 42.6 % (ref 36.5–49.3)
HGB BLD-MCNC: 14.1 G/DL (ref 12–17)
IMM GRANULOCYTES # BLD AUTO: 0.15 THOUSAND/UL (ref 0–0.2)
IMM GRANULOCYTES NFR BLD AUTO: 2 % (ref 0–2)
LYMPHOCYTES # BLD AUTO: 1.52 THOUSANDS/ΜL (ref 0.6–4.47)
LYMPHOCYTES NFR BLD AUTO: 18 % (ref 14–44)
MCH RBC QN AUTO: 32.4 PG (ref 26.8–34.3)
MCHC RBC AUTO-ENTMCNC: 33.1 G/DL (ref 31.4–37.4)
MCV RBC AUTO: 98 FL (ref 82–98)
MONOCYTES # BLD AUTO: 0.64 THOUSAND/ΜL (ref 0.17–1.22)
MONOCYTES NFR BLD AUTO: 7 % (ref 4–12)
NEUTROPHILS # BLD AUTO: 5.94 THOUSANDS/ΜL (ref 1.85–7.62)
NEUTS SEG NFR BLD AUTO: 68 % (ref 43–75)
NRBC BLD AUTO-RTO: 0 /100 WBCS
PLATELET # BLD AUTO: 107 THOUSANDS/UL (ref 149–390)
PMV BLD AUTO: 9.1 FL (ref 8.9–12.7)
POTASSIUM SERPL-SCNC: 4.2 MMOL/L (ref 3.5–5.3)
PROT SERPL-MCNC: 6.8 G/DL (ref 6.4–8.2)
RBC # BLD AUTO: 4.35 MILLION/UL (ref 3.88–5.62)
SODIUM SERPL-SCNC: 138 MMOL/L (ref 136–145)
TSH SERPL DL<=0.05 MIU/L-ACNC: 2.37 UIU/ML (ref 0.36–3.74)
VIT B12 SERPL-MCNC: 376 PG/ML (ref 100–900)
WBC # BLD AUTO: 8.62 THOUSAND/UL (ref 4.31–10.16)

## 2019-07-17 PROCEDURE — 80053 COMPREHEN METABOLIC PANEL: CPT

## 2019-07-17 PROCEDURE — 82746 ASSAY OF FOLIC ACID SERUM: CPT

## 2019-07-17 PROCEDURE — 85025 COMPLETE CBC W/AUTO DIFF WBC: CPT

## 2019-07-17 PROCEDURE — 82607 VITAMIN B-12: CPT

## 2019-07-17 PROCEDURE — 97597 DBRDMT OPN WND 1ST 20 CM/<: CPT

## 2019-07-17 PROCEDURE — 36415 COLL VENOUS BLD VENIPUNCTURE: CPT

## 2019-07-17 PROCEDURE — 84443 ASSAY THYROID STIM HORMONE: CPT

## 2019-07-22 ENCOUNTER — OFFICE VISIT (OUTPATIENT)
Dept: PULMONOLOGY | Facility: MEDICAL CENTER | Age: 74
End: 2019-07-22
Payer: MEDICARE

## 2019-07-22 ENCOUNTER — HOSPITAL ENCOUNTER (OUTPATIENT)
Dept: RADIOLOGY | Facility: HOSPITAL | Age: 74
Discharge: HOME | End: 2019-07-22
Payer: MEDICARE

## 2019-07-22 ENCOUNTER — TRANSCRIBE ORDERS (OUTPATIENT)
Dept: ADMINISTRATIVE | Facility: HOSPITAL | Age: 74
End: 2019-07-22

## 2019-07-22 VITALS
HEART RATE: 91 BPM | OXYGEN SATURATION: 95 % | RESPIRATION RATE: 12 BRPM | TEMPERATURE: 100.5 F | DIASTOLIC BLOOD PRESSURE: 56 MMHG | BODY MASS INDEX: 22.79 KG/M2 | WEIGHT: 193 LBS | HEIGHT: 77 IN | SYSTOLIC BLOOD PRESSURE: 116 MMHG

## 2019-07-22 DIAGNOSIS — J96.21 ACUTE ON CHRONIC RESPIRATORY FAILURE WITH HYPOXIA (HCC): ICD-10-CM

## 2019-07-22 DIAGNOSIS — J43.2 CENTRILOBULAR EMPHYSEMA (HCC): Primary | ICD-10-CM

## 2019-07-22 DIAGNOSIS — R91.8 LUNG NODULES: ICD-10-CM

## 2019-07-22 DIAGNOSIS — I27.82 CHRONIC PULMONARY EMBOLISM WITHOUT ACUTE COR PULMONALE, UNSPECIFIED PULMONARY EMBOLISM TYPE (HCC): ICD-10-CM

## 2019-07-22 DIAGNOSIS — E88.01 AAT (ALPHA-1-ANTITRYPSIN) DEFICIENCY (HCC): ICD-10-CM

## 2019-07-22 DIAGNOSIS — J96.11 CHRONIC RESPIRATORY FAILURE WITH HYPOXIA (HCC): ICD-10-CM

## 2019-07-22 PROBLEM — I26.99 PULMONARY EMBOLISM (HCC): Status: RESOLVED | Noted: 2018-02-14 | Resolved: 2019-07-22

## 2019-07-22 PROBLEM — I27.20 PULMONARY HYPERTENSION (HCC): Status: ACTIVE | Noted: 2019-07-22

## 2019-07-22 PROCEDURE — 99214 OFFICE O/P EST MOD 30 MIN: CPT | Performed by: PHYSICIAN ASSISTANT

## 2019-07-22 PROCEDURE — 71046 X-RAY EXAM CHEST 2 VIEWS: CPT

## 2019-07-22 NOTE — ASSESSMENT & PLAN NOTE
-severe COPD  -spirometry March 2018 obstructive ratio 43%  FEV1 1 06 L 40%  FVC 3 73 L or 68%  Patient to continue on Trelegy and duo neb nebulizers t i d  To q i d

## 2019-07-22 NOTE — ASSESSMENT & PLAN NOTE
-most recent echocardiogram with PA pressure in the 50 range  -suggestion of a moderate pulmonary HTN  -in setting of prior pulmonary embolus since resolved, however, previous CT scan findings suggestive of possibility of chronic PE  -would consider V/Q scan  -ordered scan and provided instructions for patient to contact for scheduling

## 2019-07-22 NOTE — PATIENT INSTRUCTIONS
Acute fever/shortness of breath:  -follow-up chest x-ray :  Rule out pneumonia    COPD with alpha-1 antitrypsin deficiency:  -continue weekly antitrypsin replacement  -continue duo nebs 3-4 times daily  -continue Trelegy daily  -initiate pulmonary rehab    Pulmonary nodules:   Follow-up CT scan chest in October 2019    History of PE:/pulmonary HTN:  -follow-up V/Q study  -call to schedule in 1-2 weeks    Call central scheduling to schedule exams  [phone]    Follow up in 4 months October 14th with Rebecca Macdonald

## 2019-07-22 NOTE — ASSESSMENT & PLAN NOTE
-continues with 2-3 L nasal cannula  -titrate for oxygen saturation 88-92%  -patient has home concentrator and portable oxygen concentrator

## 2019-07-22 NOTE — PROGRESS NOTES
Assessment/Plan:    Problem List Items Addressed This Visit        Digestive    AAT (alpha-1-antitrypsin) deficiency ([facility] )     -patient continues weekly treatments with antitrypsin replacement            Respiratory    Chronic respiratory failure with hypoxia ([facility] )     -continues with 2-3 L nasal cannula  -titrate for oxygen saturation 88-92%  -patient has home concentrator and portable oxygen concentrator         Centrilobular emphysema ([facility] ) - Primary     -severe COPD  -spirometry March 2018 obstructive ratio 43%  FEV1 1 06 L 40%  FVC 3 73 L or 68%  Patient to continue on Trelegy and duo neb nebulizers t i d  To q i d  Relevant Orders    Ambulatory Referral to Pulmonary Rehabilitation    RESOLVED: Acute on chronic respiratory failure with hypoxia (HCC)    Relevant Orders    XR chest pa & lateral       Other    Lung nodules     -patient has lung nodules with mediastinal lymphadenopathy and hilar adenopathy  -most recently followed up June as of this year with stable appearance  -plan of follow-up chest CT in approximately 4 to 6 months         Relevant Orders    CT chest with contrast      Other Visit Diagnoses     Chronic pulmonary embolism without acute cor pulmonale, unspecified pulmonary embolism type ([facility] )        Relevant Orders    NM lung ventilation / perfusion            Return in about 4 months (around 11/22/2019) for Next scheduled follow up  Patient has scheduled follow-up with Tho Silva on October 14th  All questions are answered to the patient's satisfaction and understanding  He verbalizes understanding  He is encouraged to call with any further questions or concerns  Portions of the record may have been created with voice recognition software  Occasional wrong word or "sound a like" substitutions may have occurred due to the inherent limitations of voice recognition software  Read the chart carefully and recognize, using context, where substitutions have occurred      Electronically Signed by Wendy Castro PA-C    ______________________________________________________________________    Chief Complaint: No chief complaint on file  Patient ID: Yovany Salgado is a 75 y o  y o  male has a past medical history of Anesthesia complication, Arthritis, BPH (benign prostatic hyperplasia), Cancer ([facility] ), COPD (chronic obstructive pulmonary disease) ([facility] ), Full dentures, Hypertension, Irritable bowel syndrome, Kidney stone, Liver disease, Pulmonary emphysema ([facility] ), RSV infection (12/2017), and Wears glasses  7/22/2019  Patient presents today for follow-up visit  He is post hospitalization for COPD exacerbation    He was admitted from June 28 to July 3rd in treated inpatient for acute COPD exacerbation  He has alpha-1 antitrypsin deficiency and is frequently getting replacements every Wednesday in his home  Post hospitalization is doing well  He is no longer on steroids and completed a steroid taper in the outpatient setting  He is chronically oxygen 2 L nasal cannula  He received his oxygen from [facility]     He does report some ongoing shortness of breath, however, no wheezing  He has otherwise been feeling relatively okay  Does complain of some right sided lower back pain  He presents today with a slight temperature elevation of 100 5  On recheck is 100 3  He has no chills, rigors, no sputum or mucus production  No hemoptysis  We discussed following up with chest x-ray to rule out any early onset pneumonia as he has been feeling slightly more short of breath, however, not requiring any increases in oxygen  We discussed and reviewed imaging of his previous lung nodules and discussed plan for ongoing follow-up with repeat CT imaging in approximately 4-6 months  I have ordered CT scan for this  Also, we discussed the patient's pulmonary HTN with moderately elevated PA pressures in the 50s range  We discussed ongoing evaluation with nuclear medicine V/Q scan      If negative would have follow-up echocardiogram     This is all likely representative of both group 2 and 3 elements  Would need further workup pending results of patient's follow-up V/Q scan  Patient has upcoming follow-up with Rebecca Macdonald October 14th  [facility]    Review of Systems   Constitutional: Positive for fatigue  Negative for activity change and appetite change  HENT: Negative for dental problem, drooling, postnasal drip, rhinorrhea, sinus pressure, sinus pain and sneezing  Eyes: Negative for visual disturbance  Respiratory: Positive for shortness of breath  Negative for apnea  Dyspnea is acute on chronic  Some small increase as of recent, however, also associates with some right lower lobar pain  Cardiovascular: Negative for chest pain and leg swelling  Gastrointestinal: Negative for abdominal pain, diarrhea, nausea and vomiting  Endocrine: Negative for cold intolerance and heat intolerance  Genitourinary: Negative for dysuria and flank pain  Musculoskeletal: Negative for arthralgias, joint swelling and myalgias  Skin: Negative for color change and rash  Allergic/Immunologic: Negative for environmental allergies  Neurological: Negative for dizziness and syncope  Hematological: Does not bruise/bleed easily  Psychiatric/Behavioral: Negative for confusion  The following portions of the patient's history were reviewed and updated as appropriate: allergies, current medications, past family history, past medical history, past social history, past surgical history and problem list     Smoking history: He reports that he quit smoking about 22 months ago  He has a 60 00 pack-year smoking history  He has never used smokeless tobacco   Social history: He reports that he quit smoking about 22 months ago  He has a 60 00 pack-year smoking history  He has never used smokeless tobacco  He reports that he drank alcohol  He reports that he does not use drugs    Past Medical History: Diagnosis Date    Anesthesia complication     Difficult to wake up    Arthritis     BPH (benign prostatic hyperplasia)     urinary frequency    Cancer (HCC)     basal cell neck, face    COPD (chronic obstructive pulmonary disease) (HCC)     Full dentures     Hypertension     controlled    Irritable bowel syndrome     Kidney stone     at least 7 episodes    Liver disease     Alpha 1- enzyme deficiency - diagnosed 2002  has been on weekly replacement therapy since then    Pulmonary emphysema ([facility] )     1/25/15  FEV1 - 2 45 liters or 59% of predicted    RSV infection 12/2017    Wears glasses     for driving only     Past Surgical History:   Procedure Laterality Date    BACK SURGERY  2008    discectomy    COLONOSCOPY      COLONOSCOPY N/A 3/10/2017    Procedure: Priscilla Lorenzo;  Surgeon: Tawana Rocha MD;  Location: [place] [address] GI LAB; Service:    Colleen Rubinstein      removal of kidney stones    ESOPHAGOGASTRODUODENOSCOPY N/A 3/10/2017    Procedure: ESOPHAGOGASTRODUODENOSCOPY (EGD); Surgeon: Tawana Rocha MD;  Location: [facility]; Service:     LITHOTRIPSY      [place] ESOPHAGOGASTRODUODENOSCOPY TRANSORAL DIAGNOSTIC N/A 11/20/2018    Procedure: ESOPHAGOGASTRODUODENOSCOPY (EGD); Surgeon: Tawana Rocha MD;  Location: [facility] GI LAB;   Service: Gastroenterology    TONSILLECTOMY      VEIN LIGATION AND STRIPPING Bilateral     17's     Family History   Problem Relation Age of Onset    Emphysema Mother         never smoked    Emphysema Father     Cancer Brother         colon    Colon cancer Brother     Ulcerative colitis Family     Liver disease Family      Immunization History   Administered Date(s) Administered    INFLUENZA 09/27/2016, 09/27/2018    Influenza TIV (IM) 09/27/2013, 10/23/2014    Pneumococcal Conjugate 13-Valent 04/26/2016    Pneumococcal Polysaccharide PPV23 04/23/2011    Tdap 04/26/2016    Zoster 10/29/2014, 04/27/2015, 07/24/2018    Zoster Vaccine Recombinant 05/24/2018, 07/24/2018     Current Outpatient Medications   Medication Sig Dispense Refill    albuterol (2 5 mg/3 mL) 0 083 % nebulizer solution Inhale 1 each every 4 (four) hours as needed      albuterol (PROVENTIL HFA,VENTOLIN HFA) 90 mcg/act inhaler Inhale 2 puffs 4 (four) times a day 1 Inhaler 5    amLODIPine (NORVASC) 10 mg tablet TAKE ONE TABLET BY MOUTH ONE TIME DAILY  60 tablet 5    finasteride (PROSCAR) 5 mg tablet Take 5 mg by mouth every morning        fluticasone (FLONASE) 50 mcg/act nasal spray 2 sprays into each nostril daily 16 g 5    gabapentin (NEURONTIN) 300 mg capsule Take 1 capsule (300 mg total) by mouth 3 (three) times a day 90 capsule 3    ipratropium-albuterol (DUO-NEB) 0 5-2 5 mg/3 mL nebulizer solution Take 1 vial (3 mL total) by nebulization 4 (four) times a day 60 vial 0    ipratropium-albuterol (DUO-NEB) 0 5-2 5 mg/3 mL nebulizer solution Take 1 vial (3 mL total) by nebulization 3 (three) times a day 270 mL 5    OXYGEN-HELIUM IN Inhale 2L at rest- 3L with activity      pantoprazole (PROTONIX) 40 mg tablet TAKE ONE TABLET BY MOUTH TWICE DAILY  60 tablet 1    ranitidine (ZANTAC) 150 mg tablet Take 1 tablet (150 mg total) by mouth daily 30 tablet 3    tamsulosin (FLOMAX) 0 4 mg TAKE ONE CAPSULE BY MOUTH ONE TIME DAILY  30 capsule 3    ZEMAIRA injection Once a week-       zolpidem (AMBIEN) 10 mg tablet TAKE ONE TABLET BY MOUTH NIGHTLY AT BEDTIME  30 tablet 5    fluticasone-umeclidinium-vilanterol (TRELEGY ELLIPTA) 100-62 5-25 MCG/INH inhaler Inhale 1 puff daily for 30 days Rinse mouth after use  1 Inhaler 5    nystatin (MYCOSTATIN) cream Apply topically 2 (two) times a day (Patient not taking: Reported on 6/28/2019) 30 g 2    predniSONE 10 mg tablet Take 4 tablets by mouth daily for 3 days, then 3 tablets daily 3 days, then 2 tablets daily for 3 days, then 1 tablet daily for 3 days   (Patient not taking: Reported on 7/22/2019) 30 tablet 0     No current facility-administered medications for this visit  Allergies: Chantix [varenicline]    Objective:  Vitals:    07/22/19 1257   BP: 116/56   BP Location: Left arm   Patient Position: Sitting   Cuff Size: Standard   Pulse: 91   Resp: 12   Temp: 100 5 °F (38 1 °C)   TempSrc: Tympanic   SpO2: 95%   Weight: 87 5 kg (193 lb)   Height: 6' 5" (1 956 m)   Oxygen Therapy  SpO2: 95 %    Wt Readings from Last 3 Encounters:   07/22/19 87 5 kg (193 lb)   07/09/19 86 1 kg (189 lb 12 8 oz)   06/28/19 85 kg (187 lb 6 3 oz)     Body mass index is 22 89 kg/m²  Physical Exam   Constitutional: He is oriented to person, place, and time  He appears well-developed  HENT:   Head: Normocephalic and atraumatic  Eyes: Pupils are equal, round, and reactive to light  Conjunctivae are normal    Neck: Normal range of motion  Neck supple  Cardiovascular: Normal rate, regular rhythm and normal heart sounds  Exam reveals no gallop and no friction rub  No murmur heard  Pulmonary/Chest: Effort normal  No accessory muscle usage  No tachypnea  No respiratory distress  He has decreased breath sounds in the right upper field, the right middle field, the right lower field, the left upper field, the left middle field and the left lower field  He has no wheezes  He has no rhonchi  He has no rales  He exhibits no tenderness  Mild to moderately decreased diffuse breath sounds    Currently on 2 L nasal cannula   Abdominal: Soft  Bowel sounds are normal    Musculoskeletal: Normal range of motion  He exhibits no edema  Right lower leg: He exhibits no edema  Left lower leg: He exhibits no edema  Neurological: He is alert and oriented to person, place, and time  Skin: Skin is warm and dry  Psychiatric: He has a normal mood and affect    }    Diagnostics:  No orders to display     I have personally reviewed pertinent reports     and I have personally reviewed pertinent films in PACS  Chest x-ray, CT scan, echocardiogram  Office Spirometry Results:            [address] [place] [address] 6  [phone]     Transthoracic Echocardiogram  2D, M-mode, Doppler, and Color Doppler     Study date:  2019     Patient: Latonya Potter  MR number: [id number]  Account number: [de-identified]  : 1945  Age: 75 years  Gender: Male  Status: Inpatient  Location: Bedside  Height: 77 in  Weight: 186 6 lb  BP: 124/ 72 mmHg     Indications: Dyspnea     Diagnoses: R06 00 - Dyspnea, unspecified     Sonographer:  JOSE Dale  Primary Physician:  Davin Rincon MD  Referring Physician:  Niranjan Rodriguez PA-C  Group:  [facility]  Interpreting Physician:  DO HEAVEN Baker     LEFT VENTRICLE:  Systolic function was normal by visual assessment  Ejection fraction was estimated to be 60 %  There were no regional wall motion abnormalities  There was moderate concentric hypertrophy  Doppler parameters were consistent with abnormal left ventricular relaxation (grade 1 diastolic dysfunction)      RIGHT VENTRICLE:  The ventricle was mildly dilated      RIGHT ATRIUM:  The atrium was mildly dilated      MITRAL VALVE:  There was mild regurgitation      AORTIC VALVE:  There was no evidence for stenosis  There was no regurgitation      TRICUSPID VALVE:  There was mild regurgitation  Pulmonary artery systolic pressure was mildly to moderately increased      PERICARDIUM:  A small pericardial effusion was identified      HISTORY: PRIOR HISTORY: HTN, COPD, Emphysema, IBS, Skin Cancer, BPH     PROCEDURE: The procedure was performed at the bedside  This was a routine study  The transthoracic approach was used  The study included complete 2D imaging, M-mode, complete spectral Doppler, and color Doppler  The heart rate was 73 bpm,  at the start of the study  Image quality was adequate      LEFT VENTRICLE: Size was normal  Systolic function was normal by visual assessment  Ejection fraction was estimated to be 60 %   There were no regional wall motion abnormalities  There was moderate concentric hypertrophy  DOPPLER: Doppler  parameters were consistent with abnormal left ventricular relaxation (grade 1 diastolic dysfunction)      RIGHT VENTRICLE: The ventricle was mildly dilated  Systolic function was normal      LEFT ATRIUM: The atrium was mildly dilated  No thrombus was identified      RIGHT ATRIUM: The atrium was mildly dilated      MITRAL VALVE: There was annular calcification  Valve structure was normal  There was normal leaflet separation  No echocardiographic evidence for prolapse  DOPPLER: The transmitral velocity was within the normal range  There was no  evidence for stenosis  There was mild regurgitation      AORTIC VALVE: The valve was trileaflet  Leaflets exhibited normal thickness, calcification, and normal cuspal separation  DOPPLER: Transaortic velocity was within the normal range  There was no evidence for stenosis  There was no  regurgitation      TRICUSPID VALVE: The valve structure was normal  There was normal leaflet separation  DOPPLER: The transtricuspid velocity was within the normal range  There was mild regurgitation  Pulmonary artery systolic pressure was mildly to  moderately increased  Estimated peak PA pressure was 51 mmHg      PULMONIC VALVE: Leaflets exhibited normal thickness, no calcification, and normal cuspal separation  DOPPLER: The transpulmonic velocity was within the normal range  There was no regurgitation      PERICARDIUM: There was no thickening   A small pericardial effusion was identified      AORTA: The root exhibited normal size and fibrocalcific change      PULMONARY ARTERY: The size was normal  The morphology appeared normal      SYSTEM MEASUREMENT TABLES     2D mode  AoR Diam 2D: 3 5 cm  LA Diam (2D): 4 cm  LA/Ao (2D): 1 14  FS (2D Teich): 32 6 %  IVSd (2D): 1 3 cm  LVDEV: 100 cmï¾³  LVESV: 39 1 cmï¾³  LVIDd(2D): 4 66 cm  LVISd (2D): 3 14 cm  LVPWd (2D): 1 27 cm  SV (Teich): 60 9 cmï¾³     Apical four chamber  LVEF A4C: 56 %     Unspecified Scan Mode  MV Peak A Myron: 888 mm/s  MV Peak E Myron  Mean: 775 mm/s  MVA (PHT): 3 86 cmï¾²  PHT: 57 ms  Max P mm[Hg]  V Max: 3270 mm/s  Vmax: 3270 mm/s  RA Area: 17 4 cmï¾²  RA Volume: 41 5 cmï¾³  TAPSE: 2 2 cm     [facility]     Prepared and electronically signed by  Marie Ward DO  Signed 2019 10:23:48  ESS:    Xr Chest 1 View Portable    Result Date: 2019  Narrative: CHEST INDICATION:   SOB  Smoking history COMPARISON:  2018 EXAM PERFORMED/VIEWS:  XR CHEST PORTABLE FINDINGS: Cardiomediastinal silhouette appears unremarkable  No focal consolidation or mass density evident  There is very mild increased reticular interstitial markings in the right upper lobe, possibly representing very early infiltrate  Osseous structures appear within normal limits for patient age  Impression: Mild relative increased reticular interstitial markings in the right upper lobe  Workstation performed: [id number]     Xr Chest Pa & Lateral    Result Date: 2019  Narrative: CHEST INDICATION:   dyspnea  COMPARISON:  2019 EXAM PERFORMED/VIEWS:  XR CHEST PA & LATERAL FINDINGS: Cardiomediastinal silhouette appears unremarkable  Bandlike right upper lobe airspace opacity represents either scarring or discoid atelectasis  Osseous structures appear within normal limits for patient age  Impression: Discoid atelectasis versus scarring right upper lobe  Workstation performed: [id number]           Cta Chest Pe Study    Result Date: 2019  Narrative: CTA - CHEST WITH IV CONTRAST - PULMONARY ANGIOGRAM INDICATION:   Shortness of breath  History of pulmonary emboli  COMPARISON: Numerous prior CT pulmonary angiograms, most recent 2019   TECHNIQUE: CTA examination of the chest was performed using angiographic technique according to a protocol specifically tailored to evaluate for pulmonary embolism  Axial, sagittal, and coronal 2D reformatted images were created from the source data and  submitted for interpretation  In addition, coronal 3D MIP postprocessing was performed on the acquisition scanner  Radiation dose length product (DLP) for this visit:  546 89 mGy-cm   This examination, like all CT scans performed in the [facility], was performed utilizing techniques to minimize radiation dose exposure, including the use of iterative  reconstruction and automated exposure control  IV Contrast:  75 mL of iohexol (OMNIPAQUE)  FINDINGS: PULMONARY ARTERIAL TREE:  There are no new filling defects to suggest acute pulmonary emboli  There is persistent soft tissue within the periphery of the bifurcation of the left lower lobe pulmonary artery branches (series 2, image 111, series 603, image 114 and series 601, image 103)  These however appear to be extrinsic to the pulmonary artery  and may represent adenopathy rather than chronic pulmonary emboli  LUNGS:  Lungs are hyperinflated  Significant emphysematous changes are present throughout the lungs bilaterally  Area of scarring in the right lower lobe  Stable bilateral pulmonary nodules  PLEURA:  There is a pleural-based calcification in the right hemithorax  Findings likely represent sequela of prior trauma or infection  HEART/GREAT VESSELS:  The heart is enlarged  Coronary artery calcifications  No evidence of a pericardial effusion  MEDIASTINUM AND MIKE:  Prominent lymph nodes in the mike bilaterally, left side larger than right  Prominent mediastinal lymph nodes  CHEST WALL AND LOWER NECK:   Unremarkable  VISUALIZED STRUCTURES IN THE UPPER ABDOMEN:  The liver is enlarged with fatty infiltration  There is a hiatal hernia  There is thickening of the distal esophagus  There is reflux into the distal esophagus  Stomach moderately distended and filled with recently ingested food products   OSSEOUS STRUCTURES:  No acute fracture or destructive osseous lesion  Impression: 1  No acute pulmonary emboli  2   Stable soft tissue in the periphery of the bifurcation of the left lower lobe pulmonary artery vessels  This appears extrinsic to the vessel proper  Although the findings may represent chronic PE, confluent adenopathy in the left hilum not excluded  As stated, this is unchanged from the prior study 3  Stable emphysematous changes  4   Hiatal hernia with reflux into the distal esophagus and thickening of the distal esophagus  Recommend follow-up upper endoscopy   Workstation performed: [id number]

## 2019-07-22 NOTE — ASSESSMENT & PLAN NOTE
-patient has lung nodules with mediastinal lymphadenopathy and hilar adenopathy  -most recently followed up June as of this year with stable appearance  -plan of follow-up chest CT in approximately 4 to 6 months

## 2019-07-24 ENCOUNTER — APPOINTMENT (OUTPATIENT)
Dept: WOUND CARE | Facility: HOSPITAL | Age: 74
End: 2019-07-24
Payer: MEDICARE

## 2019-07-24 PROCEDURE — 97597 DBRDMT OPN WND 1ST 20 CM/<: CPT

## 2019-07-25 DIAGNOSIS — I27.82 CHRONIC PULMONARY EMBOLISM WITHOUT ACUTE COR PULMONALE, UNSPECIFIED PULMONARY EMBOLISM TYPE (HCC): Primary | ICD-10-CM

## 2019-07-27 DIAGNOSIS — F51.01 PRIMARY INSOMNIA: ICD-10-CM

## 2019-07-28 RX ORDER — ZOLPIDEM TARTRATE 10 MG/1
TABLET ORAL
Qty: 30 TABLET | Refills: 5 | Status: SHIPPED | OUTPATIENT
Start: 2019-07-28 | End: 2020-02-21 | Stop reason: DRUGHIGH

## 2019-07-29 ENCOUNTER — OFFICE VISIT (OUTPATIENT)
Dept: FAMILY MEDICINE CLINIC | Facility: CLINIC | Age: 74
End: 2019-07-29
Payer: MEDICARE

## 2019-07-29 VITALS
HEART RATE: 101 BPM | HEIGHT: 77 IN | BODY MASS INDEX: 22.55 KG/M2 | TEMPERATURE: 99.7 F | OXYGEN SATURATION: 90 % | DIASTOLIC BLOOD PRESSURE: 60 MMHG | WEIGHT: 191 LBS | SYSTOLIC BLOOD PRESSURE: 118 MMHG

## 2019-07-29 DIAGNOSIS — L97.929 VENOUS ULCER OF LEFT LOWER EXTREMITY WITH VARICOSE VEINS (HCC): ICD-10-CM

## 2019-07-29 DIAGNOSIS — I83.029 VENOUS ULCER OF LEFT LOWER EXTREMITY WITH VARICOSE VEINS (HCC): ICD-10-CM

## 2019-07-29 DIAGNOSIS — K43.9 HERNIA OF ABDOMINAL WALL: ICD-10-CM

## 2019-07-29 DIAGNOSIS — K13.0 ANGULAR CHEILITIS: Primary | ICD-10-CM

## 2019-07-29 DIAGNOSIS — D69.6 THROMBOCYTOPENIA (HCC): ICD-10-CM

## 2019-07-29 DIAGNOSIS — E88.01 AAT (ALPHA-1-ANTITRYPSIN) DEFICIENCY (HCC): ICD-10-CM

## 2019-07-29 DIAGNOSIS — J44.1 COPD EXACERBATION (HCC): ICD-10-CM

## 2019-07-29 PROCEDURE — 99214 OFFICE O/P EST MOD 30 MIN: CPT | Performed by: FAMILY MEDICINE

## 2019-07-29 NOTE — PROGRESS NOTES
Assessment/Plan:    No problem-specific Assessment & Plan notes found for this encounter  Diagnoses and all orders for this visit:    Angular cheilitis  -     Vitamin B2; Future    Thrombocytopenia (HCC)  -     CBC and differential; Future    AAT (alpha-1-antitrypsin) deficiency (HCC)  Comments:  stable  Hernia of abdominal wall  Comments:  observation    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 (HCC)  Comments:  f/up wound care    COPD exacerbation (HCC)          Subjective:      Patient ID: Slime Cabral is a 75 y o  male  HPI   75year old male with PMHx of alpha-1 antitrypsin deficiency, , COPD, GERD, BPH presents for followup  States breathing is stable and uses rescue inhaler 1-3 times per day  Chronic occasional productive cough unchanged from baseline  No wheezing, shortness of breath, chest pain, fevers or chills  Endorses pain in R flank, hip and suprapubic area when walking with cane  No radiation to legs  Patient does not note increased bleeding or ecchymoses  The following portions of the patient's history were reviewed and updated as appropriate: allergies, current medications, past family history, past medical history, past social history, past surgical history and problem list     Review of Systems    Gen: No fevers, chills  HEENT: Lesions on mouth, no cough, sore throat  Chest: No chest pain, shortness of breath, palpitations  Abd: Diarrhea/ loose stools decreased (2 BM's per day), noticed BRB on toilet paper when wiping occasionally  No dyschezia or constipation  Recently saw GI within last 2 months  : Baseline urgency incontinence, wears pad  No nocturnal enuresis, or dysuria  Neuro:     Objective:      /60   Pulse 101   Temp 99 7 °F (37 6 °C)   Ht 6' 5" (1 956 m)   Wt 86 6 kg (191 lb)   SpO2 90%   BMI 22 65 kg/m²          Physical Exam    GEN: NAD  HEENT: vesicular erythematous lesions at oral commissures, non pruritic and non painful  Oral pharynx clear, no leukoplakia or exudates  CV: RRR normal s1/s2  Lungs: CTAB  Abd: Soft, normal bowel sounds, small reducible hernia in suprapubic area increased with cough, mildly tender to palpation  Nickel sized  Back: No spiny tenderness  Ext: L leg bandaged, healing  No pitting edema bilaterally  Neuro: AOx3  Normal gait with cane  Skin: Scattered ecchymoses on forearms, no new rashes

## 2019-07-30 ENCOUNTER — HOSPITAL ENCOUNTER (OUTPATIENT)
Dept: RADIOLOGY | Facility: HOSPITAL | Age: 74
Discharge: HOME | End: 2019-07-30
Payer: MEDICARE

## 2019-07-30 ENCOUNTER — APPOINTMENT (OUTPATIENT)
Dept: LAB | Facility: HOSPITAL | Age: 74
End: 2019-07-30
Attending: FAMILY MEDICINE
Payer: MEDICARE

## 2019-07-30 DIAGNOSIS — I27.82 CHRONIC PULMONARY EMBOLISM WITHOUT ACUTE COR PULMONALE, UNSPECIFIED PULMONARY EMBOLISM TYPE (HCC): ICD-10-CM

## 2019-07-30 DIAGNOSIS — D69.6 THROMBOCYTOPENIA (HCC): ICD-10-CM

## 2019-07-30 DIAGNOSIS — K13.0 ANGULAR CHEILITIS: ICD-10-CM

## 2019-07-30 LAB
BASOPHILS # BLD AUTO: 0.06 THOUSANDS/ΜL (ref 0–0.1)
BASOPHILS NFR BLD AUTO: 1 % (ref 0–1)
EOSINOPHIL # BLD AUTO: 0.37 THOUSAND/ΜL (ref 0–0.61)
EOSINOPHIL NFR BLD AUTO: 6 % (ref 0–6)
ERYTHROCYTE [DISTWIDTH] IN BLOOD BY AUTOMATED COUNT: 13.4 % (ref 11.6–15.1)
HCT VFR BLD AUTO: 37 % (ref 36.5–49.3)
HGB BLD-MCNC: 12.7 G/DL (ref 12–17)
IMM GRANULOCYTES # BLD AUTO: 0.09 THOUSAND/UL (ref 0–0.2)
IMM GRANULOCYTES NFR BLD AUTO: 1 % (ref 0–2)
LYMPHOCYTES # BLD AUTO: 1.6 THOUSANDS/ΜL (ref 0.6–4.47)
LYMPHOCYTES NFR BLD AUTO: 26 % (ref 14–44)
MCH RBC QN AUTO: 32.5 PG (ref 26.8–34.3)
MCHC RBC AUTO-ENTMCNC: 34.3 G/DL (ref 31.4–37.4)
MCV RBC AUTO: 95 FL (ref 82–98)
MONOCYTES # BLD AUTO: 0.58 THOUSAND/ΜL (ref 0.17–1.22)
MONOCYTES NFR BLD AUTO: 9 % (ref 4–12)
NEUTROPHILS # BLD AUTO: 3.56 THOUSANDS/ΜL (ref 1.85–7.62)
NEUTS SEG NFR BLD AUTO: 57 % (ref 43–75)
NRBC BLD AUTO-RTO: 0 /100 WBCS
PLATELET # BLD AUTO: 245 THOUSANDS/UL (ref 149–390)
PMV BLD AUTO: 8.8 FL (ref 8.9–12.7)
RBC # BLD AUTO: 3.91 MILLION/UL (ref 3.88–5.62)
WBC # BLD AUTO: 6.26 THOUSAND/UL (ref 4.31–10.16)

## 2019-07-30 PROCEDURE — 78582 LUNG VENTILAT&PERFUS IMAGING: CPT

## 2019-07-30 PROCEDURE — A9540 TC99M MAA: HCPCS

## 2019-07-30 PROCEDURE — 36415 COLL VENOUS BLD VENIPUNCTURE: CPT

## 2019-07-30 PROCEDURE — 71046 X-RAY EXAM CHEST 2 VIEWS: CPT

## 2019-07-30 PROCEDURE — 85025 COMPLETE CBC W/AUTO DIFF WBC: CPT

## 2019-07-31 ENCOUNTER — APPOINTMENT (OUTPATIENT)
Dept: LAB | Facility: HOSPITAL | Age: 74
End: 2019-07-31
Attending: FAMILY MEDICINE
Payer: MEDICARE

## 2019-07-31 ENCOUNTER — APPOINTMENT (OUTPATIENT)
Dept: WOUND CARE | Facility: HOSPITAL | Age: 74
End: 2019-07-31
Payer: MEDICARE

## 2019-07-31 DIAGNOSIS — K13.0 ANGULAR CHEILITIS: ICD-10-CM

## 2019-07-31 PROCEDURE — 84252 ASSAY OF VITAMIN B-2: CPT

## 2019-07-31 PROCEDURE — 36415 COLL VENOUS BLD VENIPUNCTURE: CPT

## 2019-07-31 PROCEDURE — 97597 DBRDMT OPN WND 1ST 20 CM/<: CPT

## 2019-08-01 ENCOUNTER — CLINICAL SUPPORT (OUTPATIENT)
Dept: CARDIAC REHAB | Facility: CLINIC | Age: 74
End: 2019-08-01
Payer: MEDICARE

## 2019-08-01 VITALS — HEIGHT: 77 IN | WEIGHT: 195 LBS | BODY MASS INDEX: 23.02 KG/M2

## 2019-08-01 DIAGNOSIS — J43.2 CENTRILOBULAR EMPHYSEMA (HCC): ICD-10-CM

## 2019-08-01 NOTE — PROGRESS NOTES
PULMONARY REHAB ASSESSMENT    Today's date: 2019  Patient name: Kyle Alex     : 1945       MRN: [id number]  PCP: Jack Lake MD  Referring Physician: Ellwood Soulier, MD  Pulmonologist: Kathi Munguia  Surgeon: N/A  Dx:   Encounter Diagnosis   Name Primary?  Centrilobular emphysema ([facility] [address] )        Date of onset: 3/29/2018  Cultural needs: none    Height:    Wt Readings from Last 1 Encounters:   19 88 5 kg (195 lb)      Weight:   Ht Readings from Last 1 Encounters:   19 6' 5" (1 956 m)     Medical History:   Past Medical History:   Diagnosis Date    Anesthesia complication     Difficult to wake up    Arthritis     BPH (benign prostatic hyperplasia)     urinary frequency    Cancer (HCC)     basal cell neck, face    COPD (chronic obstructive pulmonary disease) (HCC)     Full dentures     Hypertension     controlled    Irritable bowel syndrome     Kidney stone     at least 7 episodes    Liver disease     Alpha 1- enzyme deficiency - diagnosed    has been on weekly replacement therapy since then    Pulmonary emphysema ([facility] [address] )     1/25/15  FEV1 - 2 45 liters or 59% of predicted    RSV infection 2017    Wears glasses     for driving only         Physical Limitations: shortness of breath at rest    Risk Factors   Cholesterol: No  Smoking: Former user  HTN: Yes  DM: No  Obesity: No   Inactivity: No  Stress:  perceived  stress: 5/10   Stressors:family and health   Goals for Stress Management:talk to family members    Family History:  Family History   Problem Relation Age of Onset    Emphysema Mother         never smoked    Emphysema Father     Cancer Brother         colon    Colon cancer Brother     Ulcerative colitis Family     Liver disease Family        Allergies: Chantix [varenicline]  ETOH:   Social History     Substance and Sexual Activity   Alcohol Use Not Currently         Current Medications:   Current Outpatient Medications   Medication Sig Dispense Refill    albuterol (2 5 mg/3 mL) 0 083 % nebulizer solution Inhale 1 each every 4 (four) hours as needed      albuterol (PROVENTIL HFA,VENTOLIN HFA) 90 mcg/act inhaler Inhale 2 puffs 4 (four) times a day 1 Inhaler 5    amLODIPine (NORVASC) 10 mg tablet TAKE ONE TABLET BY MOUTH ONE TIME DAILY  60 tablet 5    finasteride (PROSCAR) 5 mg tablet Take 5 mg by mouth every morning        fluticasone (FLONASE) 50 mcg/act nasal spray 2 sprays into each nostril daily 16 g 5    fluticasone-umeclidinium-vilanterol (TRELEGY ELLIPTA) 100-62 5-25 MCG/INH inhaler Inhale 1 puff daily for 30 days Rinse mouth after use  1 Inhaler 5    gabapentin (NEURONTIN) 300 mg capsule Take 1 capsule (300 mg total) by mouth 3 (three) times a day 90 capsule 3    ipratropium-albuterol (DUO-NEB) 0 5-2 5 mg/3 mL nebulizer solution Take 1 vial (3 mL total) by nebulization 4 (four) times a day 60 vial 0    nystatin (MYCOSTATIN) cream Apply topically 2 (two) times a day (Patient not taking: Reported on 6/28/2019) 30 g 2    OXYGEN-HELIUM IN Inhale 2L at rest- 3L with activity      pantoprazole (PROTONIX) 40 mg tablet TAKE ONE TABLET BY MOUTH TWICE DAILY  60 tablet 1    ranitidine (ZANTAC) 150 mg tablet Take 1 tablet (150 mg total) by mouth daily 30 tablet 3    tamsulosin (FLOMAX) 0 4 mg TAKE ONE CAPSULE BY MOUTH ONE TIME DAILY  30 capsule 3    ZEMAIRA injection Once a week-       zolpidem (AMBIEN) 10 mg tablet TAKE ONE TABLET BY MOUTH NIGHTLY AT BEDTIME  30 tablet 5     No current facility-administered medications for this visit              Functional Status Prior to Diagnosis for Treatment   Occupation: retired  Recreation: none  ADLs: able to perform self-care  [place]: able to perform self-care  Exercise: none  Other: has housekeeperbiweekly    Current Functional Status  Occupation: retired  Recreation: playing pool and [employer] board at [email or web address]: able to perform self-care  [place]: able to perform self-care  Exercise: none  Other: has housekeeperbiweekly    Patient Specific Goals: Increase endurance and strength to play pool and shuffleboard    Short Term Program Goals: increased strength and endurance to walk to end of driveway to gather the newspaper    Long Term Goals: increased maximal walking duration longer than 510 feet in 12 weeks    Ability to reach goals/rehabilitation potential:  Good    Projected return to function: 12 weeks  Objective tests: 6 MWT  sit to stand  arm curl      Nutritional   Reviewed details of Rate your Plate  Discussed key elements of heart healthy eating  Reviewed patient goals for dietary modifications and their clinical implications  Reviewed most recent lipid profile       Goals for dietary modification: patient does not wish to make any dietary changes at this time      Emotional/Social  Patient reports feeling some depression since d/c  Reports sufficient emotional support  Not interested in counseling at this time    [address]    Marital status:     Rate 1-5:    Marriage: N/A   Family: 5   Financial: 5   Relationships: 0   Spirituality: 0   Intellectual: 0      Domestic Violence Screening: states he feels safe at home    Comments: Initial evaluation completed

## 2019-08-01 NOTE — PROGRESS NOTES
Juan Carlos Elaine   1945     Risk: high     Pre Post % Change Goal   Date: 8/1/2019      Physical       CAT 24      6MWT (feet) 510   10% increase   UCSD Dyspnea Score 64      Arm Curl 12      Chair to Stand 4      Peak exercise CR/WY (mets) 1 7   40% increase   Emotional       PHQ9 (> 10 refer to MD) 11   4 pt decrease   [place] (lower score = improvement)       Total 26   < 27   Feelings 1   < 3   Physical Fitness 4   < 3   Social Support 4   < 3   Daily Activities 4   < 3   Social Activities 4   < 3   Pain 4   < 3   Overall Health 4   < 3   Quality of Life 3   < 3   Change in Health 3   < 3   Dietary       Rate your plate 38   > 58   Measurements       Weight 195   2 5 - 5%   BMI 23 1   19 - 25   Waist Circ  41 5   < 40 M / < 35 F   % Body fat 32 6   < 25 M / < 33 F   BP left arm               (systolic) 936   < 108   (diastolic) 74   < 90   Smoking #/day  (if applicable) 0   0   PFT/Glucose        FVC 68%      FEV1 40%      FEV1/FVC 59%      Supplemental O2 3L      A1C 5 4   4 0 - 5 6%   Fasting

## 2019-08-01 NOTE — PROGRESS NOTES
Pulmonary Rehabilitation Plan of Care   Care Plan       Today's date: 2019   Visits: Initial  Patient name: Mamta Hand      : 1945  Age: 75 y o  MRN: [id number]  Referring Physician: Nadia Mccullough MD  Provider: Simona Ortiz  Clinician: Jensen Andres RN    Dx:   Encounter Diagnosis   Name Primary?  Centrilobular emphysema ([address] )      Date of onset: 3/29/2018      SUMMARY OF PROGRESS:  Completed initial evaluation  Explained  Pulmonary rehabilitation and how the lungs functions  Completed 6 minute walk, arm curl and chair to stand test  Obtained BMI, body fat% and waist measurements  Telemetry monitor NSR at rest and with exercise  RPE 6-7   RPD 7  Completed 510 feet of 6 minute walk test  Resting oxygenation rate was 97% on 3L/min of Oxygen via nasal canula and during peak exercise 93-96%  Short of breath at rest and with activity  PHQ9 score 11  He stated he is not interested in any intervention for depression and unwilling to make any dietary changes at this time  Will continue to monitor  Medication compliance: Yes   Comments: understands medicatiosn  Fall Risk: Moderate   Comments: stated he often gets dizzy and feels unbalanced    EKG changes: NSR at rest and with exercise      EXERCISE ASSESSMENT and PLAN    Current Exercise Program in Rehab:       Frequency: 1 days/week        Minutes: 17         METS: 1 7            HR:    RPE: 6-7               RPD: 7        Modalities: Room walking      Exercise Progression 30 Day Goals :    Frequency: 2 days/week   Minutes: 31-45   METS: 1 7-2 7   HR: 107-137    RPE: 4-6   Modalities: UBE, NuStep, Recumbent bike and Room walking    Strength trainin-3 days / week  12-15 repetitions  1-2 sets per modality    Modalities: Lateral Raise, Arm Curl, Sit to Stands, Upright Rows, Front Raises and Shoulder Shrugs    Progressing:   In Progress    Home Exercise: None    Goals: increase walking distance to walk to get newspaper at home, Increase endurance to play pool and shuffle board  Education: home exercise guidelines, signs and sxs and RPE scale   Plan:none at this time  Readiness to change: Preparation      NUTRITION ASSESSMENT AND PLAN    Weight control:    Starting weight: 195   Current weight: Weight - Scale: 88 5 kg (195 lb)   Waist circumference:    Startin 5   Current: Waist circumference (inches): 41 5 Inches  Diabetes: none  Lipid management: discussed diet, no hyperlipidemia at this time  Goals:patient not interested in making any dietary changes at this time  Education: low sodium diet  Progressing:No  Plan: Education class: Reading Food Labels  Readiness to change: Pre-Contemplation      PSYCHOSOCIAL ASSESSMENT AND PLAN    Emotional: 11          10-14 = Moderate Depression  Self-reported stress level: 5   Social support: Good   Goals:  PHQ-9 - reduced severity by one level  Education: signs/sxs of depression, benefits of positive support system and coping mechanisms  Progressing:No  Plan: PHQ-9 >5 will refer to MD, Practice relaxation techniques and pt is not interested in intervention for depression at this time  Readiness to change: Pre-Contemplation      OTHER CORE COMPONENTS     Tobacco:   Social History     Tobacco Use   Smoking Status Former Smoker    Packs/day: 1 00    Years: 60 00    Pack years: 60 00    Last attempt to quit: 2017    Years since quittin 9   Smokeless Tobacco Never Used   Tobacco Comment    quit in 2017       Tobacco Use Intervention: Referral to tobacco expert:   N/A    Blood pressure:    Restin/74   Exercise: 142/76    Goals: consistent BP < 130/80  Education:  low sodium diet and HTN  Progressing: In Progress  Plan: take medications as prescribed  Readiness to change: Preparation     re

## 2019-08-02 ENCOUNTER — TELEPHONE (OUTPATIENT)
Dept: FAMILY MEDICINE CLINIC | Facility: CLINIC | Age: 74
End: 2019-08-02

## 2019-08-05 LAB — VIT B2 BLD-MCNC: 161 UG/L (ref 137–370)

## 2019-08-06 ENCOUNTER — CLINICAL SUPPORT (OUTPATIENT)
Dept: PULMONOLOGY | Facility: CLINIC | Age: 74
End: 2019-08-06
Payer: MEDICARE

## 2019-08-06 DIAGNOSIS — J43.2 CENTRILOBULAR EMPHYSEMA (HCC): ICD-10-CM

## 2019-08-06 PROCEDURE — G0424 PULMONARY REHAB W EXER: HCPCS

## 2019-08-07 ENCOUNTER — APPOINTMENT (OUTPATIENT)
Dept: WOUND CARE | Facility: HOSPITAL | Age: 74
End: 2019-08-07
Payer: MEDICARE

## 2019-08-07 PROCEDURE — 97597 DBRDMT OPN WND 1ST 20 CM/<: CPT

## 2019-08-08 ENCOUNTER — CLINICAL SUPPORT (OUTPATIENT)
Dept: PULMONOLOGY | Facility: CLINIC | Age: 74
End: 2019-08-08
Payer: MEDICARE

## 2019-08-08 DIAGNOSIS — J43.2 CENTRILOBULAR EMPHYSEMA (HCC): ICD-10-CM

## 2019-08-08 PROCEDURE — G0424 PULMONARY REHAB W EXER: HCPCS

## 2019-08-13 ENCOUNTER — CLINICAL SUPPORT (OUTPATIENT)
Dept: PULMONOLOGY | Facility: CLINIC | Age: 74
End: 2019-08-13
Payer: MEDICARE

## 2019-08-13 ENCOUNTER — TELEPHONE (OUTPATIENT)
Dept: FAMILY MEDICINE CLINIC | Facility: CLINIC | Age: 74
End: 2019-08-13

## 2019-08-13 DIAGNOSIS — J43.2 CENTRILOBULAR EMPHYSEMA (HCC): ICD-10-CM

## 2019-08-13 PROCEDURE — G0424 PULMONARY REHAB W EXER: HCPCS

## 2019-08-14 ENCOUNTER — APPOINTMENT (OUTPATIENT)
Dept: WOUND CARE | Facility: HOSPITAL | Age: 74
End: 2019-08-14
Payer: MEDICARE

## 2019-08-14 PROCEDURE — 97597 DBRDMT OPN WND 1ST 20 CM/<: CPT

## 2019-08-14 NOTE — TELEPHONE ENCOUNTER
Found the form on Mr Olvin Cleary and gave it directly to Dr Alejandra Gramajo     I did make a copy and  Dr Alejandra Gramajo  will complete and call pt with results as well

## 2019-08-15 ENCOUNTER — CLINICAL SUPPORT (OUTPATIENT)
Dept: PULMONOLOGY | Facility: CLINIC | Age: 74
End: 2019-08-15
Payer: MEDICARE

## 2019-08-15 DIAGNOSIS — J43.2 CENTRILOBULAR EMPHYSEMA (HCC): ICD-10-CM

## 2019-08-15 PROCEDURE — G0424 PULMONARY REHAB W EXER: HCPCS

## 2019-08-18 DIAGNOSIS — J43.2 CENTRILOBULAR EMPHYSEMA (HCC): ICD-10-CM

## 2019-08-19 RX ORDER — FLUTICASONE FUROATE, UMECLIDINIUM BROMIDE AND VILANTEROL TRIFENATATE 100; 62.5; 25 UG/1; UG/1; UG/1
POWDER RESPIRATORY (INHALATION)
Qty: 60 EACH | Refills: 4 | Status: SHIPPED | OUTPATIENT
Start: 2019-08-19 | End: 2019-11-15 | Stop reason: HOSPADM

## 2019-08-20 ENCOUNTER — CLINICAL SUPPORT (OUTPATIENT)
Dept: PULMONOLOGY | Facility: CLINIC | Age: 74
End: 2019-08-20
Payer: MEDICARE

## 2019-08-20 DIAGNOSIS — J43.2 CENTRILOBULAR EMPHYSEMA (HCC): ICD-10-CM

## 2019-08-20 PROCEDURE — G0424 PULMONARY REHAB W EXER: HCPCS

## 2019-08-21 ENCOUNTER — APPOINTMENT (OUTPATIENT)
Dept: WOUND CARE | Facility: HOSPITAL | Age: 74
End: 2019-08-21
Payer: MEDICARE

## 2019-08-21 DIAGNOSIS — J31.0 RHINITIS, UNSPECIFIED TYPE: ICD-10-CM

## 2019-08-21 PROCEDURE — 97597 DBRDMT OPN WND 1ST 20 CM/<: CPT

## 2019-08-22 ENCOUNTER — CLINICAL SUPPORT (OUTPATIENT)
Dept: PULMONOLOGY | Facility: CLINIC | Age: 74
End: 2019-08-22
Payer: MEDICARE

## 2019-08-22 DIAGNOSIS — J43.2 CENTRILOBULAR EMPHYSEMA (HCC): ICD-10-CM

## 2019-08-22 PROCEDURE — G0424 PULMONARY REHAB W EXER: HCPCS

## 2019-08-27 ENCOUNTER — OFFICE VISIT (OUTPATIENT)
Dept: FAMILY MEDICINE CLINIC | Facility: CLINIC | Age: 74
End: 2019-08-27
Payer: MEDICARE

## 2019-08-27 ENCOUNTER — CLINICAL SUPPORT (OUTPATIENT)
Dept: PULMONOLOGY | Facility: CLINIC | Age: 74
End: 2019-08-27
Payer: MEDICARE

## 2019-08-27 VITALS
HEIGHT: 77 IN | WEIGHT: 196 LBS | TEMPERATURE: 98.6 F | DIASTOLIC BLOOD PRESSURE: 70 MMHG | SYSTOLIC BLOOD PRESSURE: 140 MMHG | OXYGEN SATURATION: 95 % | BODY MASS INDEX: 23.14 KG/M2 | HEART RATE: 101 BPM

## 2019-08-27 DIAGNOSIS — E88.01 AAT (ALPHA-1-ANTITRYPSIN) DEFICIENCY (HCC): ICD-10-CM

## 2019-08-27 DIAGNOSIS — J43.2 CENTRILOBULAR EMPHYSEMA (HCC): ICD-10-CM

## 2019-08-27 DIAGNOSIS — R20.8 DECREASED SENSATION OF FOOT: Primary | ICD-10-CM

## 2019-08-27 PROCEDURE — 99214 OFFICE O/P EST MOD 30 MIN: CPT | Performed by: FAMILY MEDICINE

## 2019-08-27 PROCEDURE — G0424 PULMONARY REHAB W EXER: HCPCS

## 2019-08-27 NOTE — PROGRESS NOTES
Assessment/Plan:    No problem-specific Assessment & Plan notes found for this encounter  Diagnoses and all orders for this visit:    Decreased sensation of foot  Comments: Will check labs and re-evaluate  Perhaps medication related  Orders:  -     CBC and differential; Future  -     Comprehensive metabolic panel; Future  -     Magnesium; Future  -     HEMOGLOBIN A1C W/ EAG ESTIMATION; Future    AAT (alpha-1-antitrypsin) deficiency ([facility] )  Comments:  Continue current treatment per pulmonology    Centrilobular emphysema ([facility] )  Comments:  Continue oxygen and current treatment  Subjective:      Patient ID: Kasandra Galicia is a 75 y o  male  HPI patient presents for follow-up of COPD as well as decreased sensation in his feet  He has no history of diabetes but has had paresthesias and decreased sensation of his feet for probably a few years which is getting slowly worse  He does not experience the same feeling in his hands  He has no loss of strength  Currently his breathing is stable as he takes his medication and uses his oxygen  He has dyspnea on exertion which is his baseline  He denies fever, sweats or chills  He denies URI symptoms currently  He has no chest pain or palpitations  He has no GI symptoms  He has no incontinence of bladder or bowel  He has no foot drop he has had no falls  No back pain  The following portions of the patient's history were reviewed and updated as appropriate: allergies, current medications, past family history, past medical history, past social history, past surgical history and problem list     Review of Systems  denies peripheral edema  Denies heat or cold intolerance except when his breathing is bothered  Has no unusual changes in appetite or bowel pattern  No melena or blood per rectum  No hematuria  He has had no unusual hair loss      Objective:      /70   Pulse 101   Temp 98 6 °F (37 °C)   Ht 6' 5" (1 956 m)   Wt 88 9 kg (196 lb) SpO2 95%   BMI 23 24 kg/m²          Physical Exam  general cooperative in no distress  Neck is supple without thyromegaly or JVD  No LAD  HEENT within normal limits  Lungs with decreased aeration but no audible wheezing  Heart borderline tachycardic but regular  Abdomen BS positive and benign  Extremities with palpable pedal pulses  He has decreased sensation in no unusual dermatomal pattern on his feet particularly the plantar aspects  His capillary refill is normal strength lower extremities is 5/5  DTRs are 2+ and symmetric

## 2019-08-28 ENCOUNTER — APPOINTMENT (OUTPATIENT)
Dept: WOUND CARE | Facility: HOSPITAL | Age: 74
End: 2019-08-28
Payer: MEDICARE

## 2019-08-28 PROCEDURE — 97597 DBRDMT OPN WND 1ST 20 CM/<: CPT

## 2019-08-29 ENCOUNTER — CLINICAL SUPPORT (OUTPATIENT)
Dept: PULMONOLOGY | Facility: CLINIC | Age: 74
End: 2019-08-29
Payer: MEDICARE

## 2019-08-29 ENCOUNTER — APPOINTMENT (OUTPATIENT)
Dept: LAB | Facility: CLINIC | Age: 74
End: 2019-08-29
Payer: MEDICARE

## 2019-08-29 DIAGNOSIS — J43.2 CENTRILOBULAR EMPHYSEMA (HCC): ICD-10-CM

## 2019-08-29 DIAGNOSIS — R20.8 DECREASED SENSATION OF FOOT: ICD-10-CM

## 2019-08-29 LAB
ALBUMIN SERPL BCP-MCNC: 4.1 G/DL (ref 3.5–5)
ALP SERPL-CCNC: 66 U/L (ref 46–116)
ALT SERPL W P-5'-P-CCNC: 40 U/L (ref 12–78)
ANION GAP SERPL CALCULATED.3IONS-SCNC: 7 MMOL/L (ref 4–13)
AST SERPL W P-5'-P-CCNC: 21 U/L (ref 5–45)
BASOPHILS # BLD AUTO: 0.09 THOUSANDS/ΜL (ref 0–0.1)
BASOPHILS NFR BLD AUTO: 1 % (ref 0–1)
BILIRUB SERPL-MCNC: 0.9 MG/DL (ref 0.2–1)
BUN SERPL-MCNC: 13 MG/DL (ref 5–25)
CALCIUM SERPL-MCNC: 9.5 MG/DL (ref 8.3–10.1)
CHLORIDE SERPL-SCNC: 110 MMOL/L (ref 100–108)
CO2 SERPL-SCNC: 25 MMOL/L (ref 21–32)
CREAT SERPL-MCNC: 1.28 MG/DL (ref 0.6–1.3)
EOSINOPHIL # BLD AUTO: 0.36 THOUSAND/ΜL (ref 0–0.61)
EOSINOPHIL NFR BLD AUTO: 6 % (ref 0–6)
ERYTHROCYTE [DISTWIDTH] IN BLOOD BY AUTOMATED COUNT: 14.2 % (ref 11.6–15.1)
EST. AVERAGE GLUCOSE BLD GHB EST-MCNC: 103 MG/DL
GFR SERPL CREATININE-BSD FRML MDRD: 54 ML/MIN/1.73SQ M
GLUCOSE SERPL-MCNC: 104 MG/DL (ref 65–140)
HBA1C MFR BLD: 5.2 % (ref 4.2–6.3)
HCT VFR BLD AUTO: 40 % (ref 36.5–49.3)
HGB BLD-MCNC: 13.3 G/DL (ref 12–17)
IMM GRANULOCYTES # BLD AUTO: 0.03 THOUSAND/UL (ref 0–0.2)
IMM GRANULOCYTES NFR BLD AUTO: 1 % (ref 0–2)
LYMPHOCYTES # BLD AUTO: 1.96 THOUSANDS/ΜL (ref 0.6–4.47)
LYMPHOCYTES NFR BLD AUTO: 30 % (ref 14–44)
MAGNESIUM SERPL-MCNC: 2.3 MG/DL (ref 1.6–2.6)
MCH RBC QN AUTO: 32.2 PG (ref 26.8–34.3)
MCHC RBC AUTO-ENTMCNC: 33.3 G/DL (ref 31.4–37.4)
MCV RBC AUTO: 97 FL (ref 82–98)
MONOCYTES # BLD AUTO: 0.62 THOUSAND/ΜL (ref 0.17–1.22)
MONOCYTES NFR BLD AUTO: 10 % (ref 4–12)
NEUTROPHILS # BLD AUTO: 3.49 THOUSANDS/ΜL (ref 1.85–7.62)
NEUTS SEG NFR BLD AUTO: 52 % (ref 43–75)
NRBC BLD AUTO-RTO: 0 /100 WBCS
PLATELET # BLD AUTO: 186 THOUSANDS/UL (ref 149–390)
PMV BLD AUTO: 9.5 FL (ref 8.9–12.7)
POTASSIUM SERPL-SCNC: 4.3 MMOL/L (ref 3.5–5.3)
PROT SERPL-MCNC: 7.1 G/DL (ref 6.4–8.2)
RBC # BLD AUTO: 4.13 MILLION/UL (ref 3.88–5.62)
SODIUM SERPL-SCNC: 142 MMOL/L (ref 136–145)
WBC # BLD AUTO: 6.55 THOUSAND/UL (ref 4.31–10.16)

## 2019-08-29 PROCEDURE — 36415 COLL VENOUS BLD VENIPUNCTURE: CPT

## 2019-08-29 PROCEDURE — G0424 PULMONARY REHAB W EXER: HCPCS

## 2019-08-29 PROCEDURE — 80053 COMPREHEN METABOLIC PANEL: CPT

## 2019-08-29 PROCEDURE — 83036 HEMOGLOBIN GLYCOSYLATED A1C: CPT

## 2019-08-29 PROCEDURE — 83735 ASSAY OF MAGNESIUM: CPT

## 2019-08-29 PROCEDURE — 85025 COMPLETE CBC W/AUTO DIFF WBC: CPT

## 2019-08-29 RX ORDER — FLUTICASONE PROPIONATE 50 MCG
SPRAY, SUSPENSION (ML) NASAL
Qty: 16 G | Refills: 4 | Status: SHIPPED | OUTPATIENT
Start: 2019-08-29 | End: 2019-12-09 | Stop reason: HOSPADM

## 2019-08-29 NOTE — PROGRESS NOTES
Pulmonary Rehabilitation Plan of Care   30 day       Today's date: 2019   Visits: 7  Patient name: Celina Yoo      : 1945  Age: 75 y o  MRN: [id number]  Referring Physician: Nicole Alicea PA-C  Provider: Sunil Cox  Clinician: Martina Bryant RN    Dx:   Encounter Diagnosis   Name Primary?  Centrilobular emphysema ([address] )      Date of onset: 3/29/2018      SUMMARY OF PROGRESS:  Mr Allan Frederick completed 7 sessions of the pulmonary rehabilitation program  He completes 41 minutes of cardiovascular exercise with a light weight strength training component  His RPE 3-6 RPD 3-8  Resting oxygenation rate was 95-98% on 3L/min of Oxygen via nasal canula and during peak exercise 94-98%  Short of breath at rest and with activity  Exercise MET level increased from 1 7 to 2 3 MET's  PHQ9 score 11  Will repeat PHQ9 today  He stated he is not interested in any intervention for depression and unwilling to make any dietary changes at this time  He has a attended weekly educational classes and had a face to face with pulmonologist   Will continue to monitor  Medication compliance: Yes   Comments: understands medicatiosn  Fall Risk: Moderate   Comments: stated he often gets dizzy and feels unbalanced    EKG changes: NSR at rest and with exercise      EXERCISE ASSESSMENT and PLAN    Current Exercise Program in Rehab:       Frequency: 2 days/week        Minutes: 41         METS: 2 3            HR:    RPE: 3-6               RPD: 3-8        Modalities: Treadmill, UBE, NuStep and Recumbent bike      Exercise Progression 30 Day Goals :    Frequency: 2 days/week   Minutes: 40-45   METS: 2 3-3 5   HR: 107-137    RPE: 4-6   Modalities: Treadmill, UBE, NuStep and Recumbent bike    Strength trainin-3 days / week  12-15 repetitions  1-2 sets per modality    Modalities: Lateral Raise, Arm Curl, Sit to Stands, Upright Rows, Front Raises and Shoulder Shrugs    Progressing:   In Progress    Home Exercise: None    Goals: increase walking distance to walk to get newspaper at home, Increase endurance to play pool and shuffle board  Education: home exercise guidelines, signs and sxs and RPE scale, exercise and the pulmonary patient  Plan:none at this time  Readiness to change: Preparation      NUTRITION ASSESSMENT AND PLAN    Weight control:    Starting weight: 195   Current weight:     Waist circumference:    Startin 5   Current:    Diabetes: none  Lipid management: discussed diet, no hyperlipidemia at this time  Goals:patient not interested in making any dietary changes at this time  Education: low sodium diet  Progressing:No  Plan: Education class: Reading Food Labels  Readiness to change: Pre-Contemplation      PSYCHOSOCIAL ASSESSMENT AND PLAN    Emotional: 11          10-14 = Moderate Depression  Self-reported stress level: 5   Social support: Good   Goals:  PHQ-9 - reduced severity by one level  Education: signs/sxs of depression, benefits of positive support system and coping mechanisms, decreasing stress and relaxation techniques  Progressing:No  Plan: PHQ-9 >5 will refer to MD, Practice relaxation techniques and pt is not interested in intervention for depression at this time  Readiness to change: Pre-Contemplation      OTHER CORE COMPONENTS     Tobacco:   Social History     Tobacco Use   Smoking Status Former Smoker    Packs/day: 1 00    Years: 60 00    Pack years: 60 00    Last attempt to quit: 2017    Years since quittin 9   Smokeless Tobacco Never Used   Tobacco Comment    quit in 2017       Tobacco Use Intervention: Referral to tobacco expert:   N/A    Blood pressure:    Restin/70   Exercise: 132/76    Goals: consistent BP < 130/80  Education:  low sodium diet and HTN, medications and bronchial hygiene  Progressing: In Progress  Plan: take medications as prescribed  Readiness to change: Preparation      I had face to face meeting with patient  Reveiwed progress  LEANDER Barron

## 2019-08-29 NOTE — PROGRESS NOTES
Medical Director 30 day Pulmonary Rehabilitation Review    I certify that I have met with Tulio Keller face-to face to provide a 30 day progress review of his/her pulmonary rehabilitation program at [address] [place] [address]  I have reviewed the most recent individualized treatment plan (ITP), outcomes assessment, and provided opportunity for discussion with the patient      Continue with current treatment plan yes    Please provide the following modifications to the current treatment plan:  Increase time and resistance as tolerated      LEANDER Barron

## 2019-08-30 DIAGNOSIS — G62.9 NEUROPATHY: ICD-10-CM

## 2019-09-03 ENCOUNTER — CLINICAL SUPPORT (OUTPATIENT)
Dept: PULMONOLOGY | Facility: CLINIC | Age: 74
End: 2019-09-03
Payer: MEDICARE

## 2019-09-03 ENCOUNTER — OFFICE VISIT (OUTPATIENT)
Dept: FAMILY MEDICINE CLINIC | Facility: CLINIC | Age: 74
End: 2019-09-03
Payer: MEDICARE

## 2019-09-03 VITALS
SYSTOLIC BLOOD PRESSURE: 140 MMHG | WEIGHT: 195 LBS | OXYGEN SATURATION: 97 % | HEART RATE: 92 BPM | BODY MASS INDEX: 23.02 KG/M2 | TEMPERATURE: 97.9 F | DIASTOLIC BLOOD PRESSURE: 60 MMHG | HEIGHT: 77 IN

## 2019-09-03 DIAGNOSIS — H61.23 BILATERAL IMPACTED CERUMEN: ICD-10-CM

## 2019-09-03 DIAGNOSIS — E88.01 AAT (ALPHA-1-ANTITRYPSIN) DEFICIENCY (HCC): Primary | ICD-10-CM

## 2019-09-03 DIAGNOSIS — J43.2 CENTRILOBULAR EMPHYSEMA (HCC): ICD-10-CM

## 2019-09-03 DIAGNOSIS — G62.9 NEUROPATHY: ICD-10-CM

## 2019-09-03 PROBLEM — H61.20 CERUMEN IMPACTION: Status: ACTIVE | Noted: 2019-09-03

## 2019-09-03 PROCEDURE — 99213 OFFICE O/P EST LOW 20 MIN: CPT | Performed by: FAMILY MEDICINE

## 2019-09-03 PROCEDURE — G0424 PULMONARY REHAB W EXER: HCPCS

## 2019-09-03 PROCEDURE — 69209 REMOVE IMPACTED EAR WAX UNI: CPT | Performed by: FAMILY MEDICINE

## 2019-09-03 PROCEDURE — 1124F ACP DISCUSS-NO DSCNMKR DOCD: CPT | Performed by: FAMILY MEDICINE

## 2019-09-03 RX ORDER — GABAPENTIN 300 MG/1
CAPSULE ORAL
Qty: 90 CAPSULE | Refills: 2 | Status: SHIPPED | OUTPATIENT
Start: 2019-09-03 | End: 2019-11-10

## 2019-09-03 RX ORDER — GABAPENTIN 300 MG/1
300 CAPSULE ORAL 3 TIMES DAILY
Qty: 90 CAPSULE | Refills: 3 | Status: SHIPPED | OUTPATIENT
Start: 2019-09-03 | End: 2020-08-25

## 2019-09-03 NOTE — PROGRESS NOTES
Assessment/Ear cerumen removal  Date/Time: 9/3/2019 2:04 PM  Performed by: Graeme Tovar MD  Authorized by: Graeme Tovar MD     Patient location:  Clinic  Other Assisting Provider: No    Consent:     Consent obtained:  Verbal    Consent given by:  Patient    Risks discussed:  TM perforation, incomplete removal, pain, infection, bleeding and dizziness    Alternatives discussed:  Alternative treatment and no treatment  Universal protocol:     Procedure explained and questions answered to patient or proxy's satisfaction: yes      Relevant documents present and verified: yes      Test results available and properly labeled: yes      Radiology Images displayed and confirmed  If images not available, report reviewed: no      Required blood products, implants, devices and special equipment available: no      Site/side marked: yes      Immediately prior to procedure a time out was called: yes      Patient identity confirmed:  Verbally with patient  Procedure details:     Local anesthetic:  None    Location:  L ear and R ear    Procedure type: irrigation only      Approach:  External  Post-procedure details:     Complication:  None    Hearing quality:  Improved    Patient tolerance of procedure: Tolerated well, no immediate complications      Plan:    No problem-specific Assessment & Plan notes found for this encounter  Diagnoses and all orders for this visit:    A[facility]-antitrypsin[facility] )  Comments:  Stable  No changes    Bilateral impacted cerumen  Comments:  Ears flushed  Good results  Will tolerated  Other orders  -     Ear cerumen removal          Subjective:      Patient ID: Jordan Scott is a 75 y o  male  HPI patient has a history of alpha-1 antitrypsin deficiency and  D  He is stable from this viewpoint  Today comes in hoping that he can his ears flushed since a are occluded with cerumen  He denies any pain  He denies tenderness  No fever, sweats or chills    No URI symptoms  The following portions of the patient's history were reviewed and updated as appropriate: allergies, current medications, past family history, past medical history, past social history, past surgical history and problem list     Review of Systems  no chest pain   No dyspnea above baseline  No GI or  symptoms  Objective:      /60   Pulse 92   Temp 97 9 °F (36 6 °C)   Ht 6' 5" (1 956 m)   Wt 88 5 kg (195 lb)   SpO2 97%   BMI 23 12 kg/m²          Physical Exam  prior to flushing his ears he had cerumen occluding both after flushing them the cerumen was gone and TMs clear bilaterally  Lungs were fairly clear to auscultation  Heart was regular  Patient was pleasant no acute distress

## 2019-09-04 ENCOUNTER — APPOINTMENT (OUTPATIENT)
Dept: WOUND CARE | Facility: HOSPITAL | Age: 74
End: 2019-09-04
Payer: MEDICARE

## 2019-09-04 PROCEDURE — 97597 DBRDMT OPN WND 1ST 20 CM/<: CPT

## 2019-09-05 ENCOUNTER — CLINICAL SUPPORT (OUTPATIENT)
Dept: PULMONOLOGY | Facility: CLINIC | Age: 74
End: 2019-09-05
Payer: MEDICARE

## 2019-09-05 DIAGNOSIS — J43.2 CENTRILOBULAR EMPHYSEMA (HCC): ICD-10-CM

## 2019-09-05 PROCEDURE — G0424 PULMONARY REHAB W EXER: HCPCS

## 2019-09-10 ENCOUNTER — CLINICAL SUPPORT (OUTPATIENT)
Dept: PULMONOLOGY | Facility: CLINIC | Age: 74
End: 2019-09-10
Payer: MEDICARE

## 2019-09-10 DIAGNOSIS — J43.2 CENTRILOBULAR EMPHYSEMA (HCC): ICD-10-CM

## 2019-09-10 PROCEDURE — G0424 PULMONARY REHAB W EXER: HCPCS

## 2019-09-11 ENCOUNTER — APPOINTMENT (OUTPATIENT)
Dept: WOUND CARE | Facility: HOSPITAL | Age: 74
End: 2019-09-11
Payer: MEDICARE

## 2019-09-11 PROCEDURE — 97597 DBRDMT OPN WND 1ST 20 CM/<: CPT

## 2019-09-12 ENCOUNTER — CLINICAL SUPPORT (OUTPATIENT)
Dept: PULMONOLOGY | Facility: CLINIC | Age: 74
End: 2019-09-12
Payer: MEDICARE

## 2019-09-12 DIAGNOSIS — J43.2 CENTRILOBULAR EMPHYSEMA (HCC): ICD-10-CM

## 2019-09-12 PROCEDURE — G0424 PULMONARY REHAB W EXER: HCPCS

## 2019-09-16 ENCOUNTER — TELEPHONE (OUTPATIENT)
Dept: GASTROENTEROLOGY | Facility: AMBULARY SURGERY CENTER | Age: 74
End: 2019-09-16

## 2019-09-16 DIAGNOSIS — K21.9 GASTROESOPHAGEAL REFLUX DISEASE WITHOUT ESOPHAGITIS: ICD-10-CM

## 2019-09-16 RX ORDER — PANTOPRAZOLE SODIUM 40 MG/1
40 TABLET, DELAYED RELEASE ORAL 2 TIMES DAILY
Qty: 60 TABLET | Refills: 5 | Status: SHIPPED | OUTPATIENT
Start: 2019-09-16 | End: 2020-02-12 | Stop reason: SDUPTHER

## 2019-09-16 NOTE — TELEPHONE ENCOUNTER
Refill request from [facility] [place] for     Protonix oral tablet delay Release  40 mg     Take one tablet by mouth twice daily     Qty 60      Original Refill   1

## 2019-09-17 ENCOUNTER — CLINICAL SUPPORT (OUTPATIENT)
Dept: PULMONOLOGY | Facility: CLINIC | Age: 74
End: 2019-09-17
Payer: MEDICARE

## 2019-09-17 DIAGNOSIS — J43.2 CENTRILOBULAR EMPHYSEMA (HCC): ICD-10-CM

## 2019-09-17 PROCEDURE — G0424 PULMONARY REHAB W EXER: HCPCS

## 2019-09-18 ENCOUNTER — APPOINTMENT (OUTPATIENT)
Dept: WOUND CARE | Facility: HOSPITAL | Age: 74
End: 2019-09-18
Payer: MEDICARE

## 2019-09-18 PROCEDURE — 97597 DBRDMT OPN WND 1ST 20 CM/<: CPT

## 2019-09-19 ENCOUNTER — CLINICAL SUPPORT (OUTPATIENT)
Dept: PULMONOLOGY | Facility: CLINIC | Age: 74
End: 2019-09-19
Payer: MEDICARE

## 2019-09-19 DIAGNOSIS — J43.2 CENTRILOBULAR EMPHYSEMA (HCC): ICD-10-CM

## 2019-09-19 PROCEDURE — G0424 PULMONARY REHAB W EXER: HCPCS

## 2019-09-24 ENCOUNTER — CLINICAL SUPPORT (OUTPATIENT)
Dept: PULMONOLOGY | Facility: CLINIC | Age: 74
End: 2019-09-24
Payer: MEDICARE

## 2019-09-24 DIAGNOSIS — J43.2 CENTRILOBULAR EMPHYSEMA (HCC): ICD-10-CM

## 2019-09-24 PROCEDURE — G0424 PULMONARY REHAB W EXER: HCPCS

## 2019-09-24 NOTE — PROGRESS NOTES
Pulmonary Rehabilitation Plan of Care   60 day      Today's date: 2019   Visits: 15  Patient name: Kasandra Galicia      : 1945  Age: 75 y o  MRN: [id number]  Referring Physician: Aric Angel PA-C  Provider: Britta Aj  Clinician: Amanda Mendez RN    Dx:   Encounter Diagnosis   Name Primary?  Centrilobular emphysema ([facility] [address] )      Date of onset: 3/29/2018      SUMMARY OF PROGRESS:  Mr Keys completed 15 sessions of the pulmonary rehabilitation program  He completes 41 minutes of cardiovascular exercise with a light weight strength training component  His RPE 3-6 RPD 3-8  Resting oxygenation rate was 95-98% on 3L/min of Oxygen via nasal canula and during peak exercise 94-98%  Short of breath at rest and with activity  Exercise MET level increased from 1 7 to 2 3 MET's  PHQ9 score improved from 11 to 9 score  Will repeat PHQ9 today  He stated he is not interested in any intervention for depression and unwilling to make any dietary changes at this time  He has a attended weekly educational classes and had a face to face with pulmonologist   Will continue to monitor          Medication compliance: Yes   Comments: understands medicatiosn  Fall Risk: Moderate   Comments: stated he often gets dizzy and feels unbalanced    EKG changes: NSR at rest and with exercise      EXERCISE ASSESSMENT and PLAN    Current Exercise Program in Rehab:       Frequency: 2 days/week        Minutes: 41         METS: 2 3            HR:    RPE: 3-6               RPD: 3-8        Modalities: Treadmill, UBE, NuStep and Recumbent bike      Exercise Progression 30 Day Goals :    Frequency: 2 days/week   Minutes: 40-45   METS: 2 3-3 5   HR: 107-137    RPE: 4-6   Modalities: Treadmill, UBE, NuStep and Recumbent bike    Strength trainin-3 days / week  12-15 repetitions  1-2 sets per modality    Modalities: Lateral Raise, Arm Curl, Sit to Stands, Upright Rows, Front Raises and Shoulder Shrugs    Progressing: In Progress    Home Exercise: None    Goals: increase walking distance to walk to get newspaper at home, Increase endurance to play pool and shuffle board  Education: home exercise guidelines, signs and sxs and RPE scale, exercise and the pulmonary patient  Plan:none at this time  Readiness to change: Preparation      NUTRITION ASSESSMENT AND PLAN    Weight control:    Starting weight: 195   Current weight:     Waist circumference:    Startin 5   Current:    Diabetes: none  Lipid management: discussed diet, no hyperlipidemia at this time  Goals:patient not interested in making any dietary changes at this time  Education: low sodium diet  Progressing:No  Plan: Education class: Reading Food Labels  Readiness to change: Pre-Contemplation      PSYCHOSOCIAL ASSESSMENT AND PLAN    Emotional: 11          10-14 = Moderate Depression  Self-reported stress level: 5   Social support: Good   Goals:  PHQ-9 - reduced severity by one level  Education: signs/sxs of depression, benefits of positive support system and coping mechanisms, decreasing stress and relaxation techniques  Progressing:No  Plan: PHQ-9 >5 will refer to MD, Practice relaxation techniques and pt is not interested in intervention for depression at this time  Readiness to change: Pre-Contemplation      OTHER CORE COMPONENTS     Tobacco:   Social History     Tobacco Use   Smoking Status Former Smoker    Packs/day: 1 00    Years: 60 00    Pack years: 60 00    Last attempt to quit: 2017    Years since quittin 0   Smokeless Tobacco Never Used   Tobacco Comment    quit in 2017       Tobacco Use Intervention: Referral to tobacco expert:   N/A    Blood pressure:    Restin/66   Exercise: 138/66    Goals: consistent BP < 130/80  Education:  low sodium diet and HTN, medications and bronchial hygiene  Progressing: In Progress  Plan: take medications as prescribed  Readiness to change: Preparation      I had face to face meeting with patient  Reveiwed progress       LEANDER Inman

## 2019-09-25 ENCOUNTER — APPOINTMENT (OUTPATIENT)
Dept: WOUND CARE | Facility: HOSPITAL | Age: 74
End: 2019-09-25
Payer: MEDICARE

## 2019-09-25 PROCEDURE — 97597 DBRDMT OPN WND 1ST 20 CM/<: CPT

## 2019-09-26 ENCOUNTER — CLINICAL SUPPORT (OUTPATIENT)
Dept: PULMONOLOGY | Facility: CLINIC | Age: 74
End: 2019-09-26
Payer: MEDICARE

## 2019-09-26 DIAGNOSIS — J43.2 CENTRILOBULAR EMPHYSEMA (HCC): ICD-10-CM

## 2019-09-26 PROCEDURE — G0424 PULMONARY REHAB W EXER: HCPCS

## 2019-09-26 NOTE — PROGRESS NOTES
Medical Director 30 day Pulmonary Rehabilitation Review    I certify that I have met with Georg Primrose face-to face to provide a 30 day progress review of his/her pulmonary rehabilitation program at [facility]  I have reviewed the most recent individualized treatment plan (ITP), outcomes assessment, and provided opportunity for discussion with the patient    Continue with current treatment plan yes    Please provide the following modifications to the current treatment plan:  Increase time and reistance as tolerated    Kati Chino, DO

## 2019-09-27 ENCOUNTER — APPOINTMENT (OUTPATIENT)
Dept: WOUND CARE | Facility: HOSPITAL | Age: 74
End: 2019-09-27
Payer: MEDICARE

## 2019-09-27 PROCEDURE — 29581 APPL MULTLAYER CMPRN SYS LEG: CPT

## 2019-10-01 ENCOUNTER — HOSPITAL ENCOUNTER (OUTPATIENT)
Dept: RADIOLOGY | Facility: HOSPITAL | Age: 74
Discharge: HOME | End: 2019-10-01
Payer: MEDICARE

## 2019-10-01 ENCOUNTER — CLINICAL SUPPORT (OUTPATIENT)
Dept: PULMONOLOGY | Facility: CLINIC | Age: 74
End: 2019-10-01
Payer: MEDICARE

## 2019-10-01 DIAGNOSIS — R91.8 LUNG NODULES: ICD-10-CM

## 2019-10-01 DIAGNOSIS — J43.2 CENTRILOBULAR EMPHYSEMA (HCC): ICD-10-CM

## 2019-10-01 PROCEDURE — G0424 PULMONARY REHAB W EXER: HCPCS

## 2019-10-01 PROCEDURE — 71260 CT THORAX DX C+: CPT

## 2019-10-01 RX ADMIN — IOHEXOL 100 ML: 350 INJECTION, SOLUTION INTRAVENOUS at 08:51

## 2019-10-02 ENCOUNTER — APPOINTMENT (OUTPATIENT)
Dept: WOUND CARE | Facility: HOSPITAL | Age: 74
End: 2019-10-02
Payer: MEDICARE

## 2019-10-02 PROCEDURE — 97597 DBRDMT OPN WND 1ST 20 CM/<: CPT

## 2019-10-03 ENCOUNTER — CLINICAL SUPPORT (OUTPATIENT)
Dept: PULMONOLOGY | Facility: CLINIC | Age: 74
End: 2019-10-03
Payer: MEDICARE

## 2019-10-03 ENCOUNTER — OFFICE VISIT (OUTPATIENT)
Dept: FAMILY MEDICINE CLINIC | Facility: CLINIC | Age: 74
End: 2019-10-03
Payer: MEDICARE

## 2019-10-03 VITALS
DIASTOLIC BLOOD PRESSURE: 68 MMHG | SYSTOLIC BLOOD PRESSURE: 128 MMHG | WEIGHT: 194 LBS | HEART RATE: 98 BPM | OXYGEN SATURATION: 95 % | TEMPERATURE: 99.3 F | BODY MASS INDEX: 23.01 KG/M2 | RESPIRATION RATE: 20 BRPM

## 2019-10-03 DIAGNOSIS — K21.00 GASTROESOPHAGEAL REFLUX DISEASE WITH ESOPHAGITIS: ICD-10-CM

## 2019-10-03 DIAGNOSIS — R22.1 NECK SWELLING: Primary | ICD-10-CM

## 2019-10-03 DIAGNOSIS — J43.2 CENTRILOBULAR EMPHYSEMA (HCC): ICD-10-CM

## 2019-10-03 PROCEDURE — 99213 OFFICE O/P EST LOW 20 MIN: CPT | Performed by: FAMILY MEDICINE

## 2019-10-03 PROCEDURE — G0424 PULMONARY REHAB W EXER: HCPCS

## 2019-10-03 NOTE — PROGRESS NOTES
Assessment/Plan:    No problem-specific Assessment & Plan notes found for this encounter  Diagnoses and all orders for this visit:    Neck swelling  Comments:  Obtain CT of the neck  Orders:  -     Cancel: CTA neck w wo contrast; Future    Gastroesophageal reflux disease with esophagitis  Comments:  Continue GI medications and referred to GI for evaluation  Orders:  -     Ambulatory referral to Gastroenterology; Future    Centrilobular emphysema (HCC)  Comments:  Stable  No changes recommended  Subjective:      Patient ID: Jay Jay Wilcox is a 75 y o  male  HPI patient presents for concern over neck swelling  Had recent CT scan of the chest which shows a soft tissue density of the left lower lobe of the pulmonary artery which is stable  He also had a hiatal hernia and thickening of the distal esophagus in addition he has a stable 4 mm nodule in left upper lobe and severe emphysema  He feels some neck fullness  He does get reflux but he has no dysphagia or odynophagia  It does cause him some discomfort at times but currently has no pain  He denies fever, sweats or chills  He has no heat or cold intolerance  He has no unusual fluctuations in weight  He has no nausea or vomiting  He always has loose stools with his alpha-1 antitrypsin deficiency but no blood noted  The following portions of the patient's history were reviewed and updated as appropriate: allergies, current medications, past family history, past medical history, past social history, past surgical history and problem list     Review of Systems  denies chest pain, palpitations  His baseline dyspnea for which he uses oxygen  He has no ear or throat pain  Occasionally has a dry cough  His lower extremity edema is at its baseline  No rashes  Objective:      /68   Pulse 98   Temp 99 3 °F (37 4 °C)   Resp 20   Wt 88 kg (194 lb)   SpO2 95%   BMI 23 01 kg/m²          Physical Exam  in no acute a TMs clear BL  Eyes EOMI ALEXYS saw her sclera clear conjunctiva pink  No mmm without erythema or exudate  No thrush noted  Neck has no palpable lymphadenopathy or masses  Appears slightly full  I cannot appreciate any thyroid nodules he is is baseline peripheral edema lower extremities    No focal deficits on neurologic exam

## 2019-10-04 DIAGNOSIS — I10 ESSENTIAL HYPERTENSION: ICD-10-CM

## 2019-10-08 ENCOUNTER — CLINICAL SUPPORT (OUTPATIENT)
Dept: PULMONOLOGY | Facility: CLINIC | Age: 74
End: 2019-10-08
Payer: MEDICARE

## 2019-10-08 DIAGNOSIS — J43.2 CENTRILOBULAR EMPHYSEMA (HCC): ICD-10-CM

## 2019-10-08 PROCEDURE — G0424 PULMONARY REHAB W EXER: HCPCS

## 2019-10-08 RX ORDER — AMLODIPINE BESYLATE 10 MG/1
TABLET ORAL
Qty: 30 TABLET | Refills: 4 | Status: SHIPPED | OUTPATIENT
Start: 2019-10-08 | End: 2020-06-12

## 2019-10-09 ENCOUNTER — APPOINTMENT (OUTPATIENT)
Dept: WOUND CARE | Facility: HOSPITAL | Age: 74
End: 2019-10-09
Payer: MEDICARE

## 2019-10-09 PROCEDURE — 97597 DBRDMT OPN WND 1ST 20 CM/<: CPT

## 2019-10-10 ENCOUNTER — HOSPITAL ENCOUNTER (OUTPATIENT)
Dept: RADIOLOGY | Facility: HOSPITAL | Age: 74
Discharge: HOME | End: 2019-10-10
Attending: FAMILY MEDICINE
Payer: MEDICARE

## 2019-10-10 ENCOUNTER — CLINICAL SUPPORT (OUTPATIENT)
Dept: PULMONOLOGY | Facility: CLINIC | Age: 74
End: 2019-10-10
Payer: MEDICARE

## 2019-10-10 DIAGNOSIS — R22.1 NECK SWELLING: ICD-10-CM

## 2019-10-10 DIAGNOSIS — J43.2 CENTRILOBULAR EMPHYSEMA (HCC): ICD-10-CM

## 2019-10-10 PROCEDURE — 70491 CT SOFT TISSUE NECK W/DYE: CPT

## 2019-10-10 PROCEDURE — G0424 PULMONARY REHAB W EXER: HCPCS

## 2019-10-10 RX ADMIN — IOHEXOL 85 ML: 350 INJECTION, SOLUTION INTRAVENOUS at 08:34

## 2019-10-11 ENCOUNTER — TELEPHONE (OUTPATIENT)
Dept: GASTROENTEROLOGY | Facility: AMBULARY SURGERY CENTER | Age: 74
End: 2019-10-11

## 2019-10-11 ENCOUNTER — TELEPHONE (OUTPATIENT)
Dept: GASTROENTEROLOGY | Facility: CLINIC | Age: 74
End: 2019-10-11

## 2019-10-11 ENCOUNTER — OFFICE VISIT (OUTPATIENT)
Dept: GASTROENTEROLOGY | Facility: CLINIC | Age: 74
End: 2019-10-11
Payer: MEDICARE

## 2019-10-11 VITALS
BODY MASS INDEX: 23.39 KG/M2 | HEIGHT: 77 IN | HEART RATE: 84 BPM | SYSTOLIC BLOOD PRESSURE: 154 MMHG | WEIGHT: 198.13 LBS | TEMPERATURE: 98.7 F | DIASTOLIC BLOOD PRESSURE: 82 MMHG

## 2019-10-11 DIAGNOSIS — R11.10 REGURGITATION OF FOOD: ICD-10-CM

## 2019-10-11 DIAGNOSIS — K21.00 GASTROESOPHAGEAL REFLUX DISEASE WITH ESOPHAGITIS: Primary | ICD-10-CM

## 2019-10-11 DIAGNOSIS — R14.0 ABDOMINAL BLOATING: ICD-10-CM

## 2019-10-11 DIAGNOSIS — K52.9 CHRONIC DIARRHEA: Primary | ICD-10-CM

## 2019-10-11 DIAGNOSIS — R68.81 EARLY SATIETY: ICD-10-CM

## 2019-10-11 DIAGNOSIS — R13.19 ESOPHAGEAL DYSPHAGIA: ICD-10-CM

## 2019-10-11 DIAGNOSIS — K22.89 ESOPHAGEAL THICKENING: ICD-10-CM

## 2019-10-11 DIAGNOSIS — K52.9 CHRONIC DIARRHEA: ICD-10-CM

## 2019-10-11 PROCEDURE — 99214 OFFICE O/P EST MOD 30 MIN: CPT | Performed by: PHYSICIAN ASSISTANT

## 2019-10-11 RX ORDER — MONTELUKAST SODIUM 4 MG/1
1 TABLET, CHEWABLE ORAL 2 TIMES DAILY
Qty: 60 TABLET | Refills: 5 | Status: SHIPPED | OUTPATIENT
Start: 2019-10-11 | End: 2019-12-09 | Stop reason: HOSPADM

## 2019-10-11 NOTE — PATIENT INSTRUCTIONS
Stop ranitidine or zantac  Take Protonix or pantoprazole twice daily on empty stomach about 20-30 min before breakfast and dinner

## 2019-10-11 NOTE — PROGRESS NOTES
Assessment and Plan    #1  GERD with esophageal thickening on recent imaging, esophageal dysphagia, and regurgitation of food: uncontrolled symptoms despite BID PPI  Reports almost daily heartburn  Also with distal esophageal thickening noted on recent chest imaging which is new compared to last year  Patient also having esophageal dysphagia and regurgitation of food   -Continue PPi BID  Discussed timing of taking medication  He had been taking this after meals  Discussed importance of taking this twice daily on empty stomach about 30 min before eating  This may be why his symptoms are not responding to the medication  -anti-reflux diet and measures  -EGD to rule out lower esophageal stricture or malignancy in setting of thickening and dysphagia symptoms    -discussed high risks of procedure with patient in regards to his lung disease and oxygen use  We will schedule it in the hospital  -I will reach out to his pulmonologist to seek their opinion on anesthesia  -stop zantac (althought patient does not appear to be taking this currently)    #2  Chronic diarrhea: reports this has been going on for years but also has notes in the past reporting constipation and no random bx done during last colonoscopy  Concern would be for microscopic colitis verus functional  Varies from 1-10 BM daily pending the day, did not tolerate questran in the past    -Will plan for colonoscopy at same time as EGD  I did discuss risks with patient in regards to his respiratory status  I will reach out to pulmonology as above  -Dulc/Golytely prep  -We will schedule colonoscopy to be done in the hospital  -will trial colestipol tablet BID for diarrhea to see if he better tolerates this over the powder  #3  Abdominal bloating and early satiety: reports that for the last few months he gets very full quickly and feels very bloated  Denies N/V  Denies weight loss   Differential includes h pylori infection, gastroparesis, gastritis, functional, malignancy  -will check GES  -small frequent low residue meals  -EGD as above  -consider CT scan if EGD is normal    --------------------------------------------------------------------------------------------------------------------    Chief Complaint: f/u chronic diarrhea, GERD, esophageal dysphagia, abdominal bloating and early satiety    HPI: Gopi Brown is a 75 y o  male with hx alpha-1 antitrypsin deficiency with emphysema, GERD,chronic diarrhea, HTN, and who presents today for follow up for chronic diarrhea, GERD, esophageal dysphagia, abdominal bloating and early satiety  His biggest complaint is abdominal bloating, fullness, and early satiety occurring for the last several months  He reports he can only eat a small amount of food and then will get very full qucikoly and feel bloated  He denies nausea or vomiting  He denies pain  He has never had a GES  He also has GERD  He reports daily symptoms despite PPI BID  He does endorse taking the PPI after eating however  He reports dysphagia of solid food and pills that started about 2 months ago  sometimes he will regurgitate food  He had a recent CT of the neck and chest showing distal esophogeal thickening which is new compared to prior studies  He had an US and CT scan of abdomen last year that were relatively unremarkable  He had hepatic steatosis but no cirrhosis  He reports chronic diarrhea for 20 years  However, there are some GI notes from the past that state he was constipated  His last office visit in May stated he was only having diarrhea for 4-5 months  He had negative stools studies at that time  He reports it varies from once daily to 10 times daily pending the day  He does not notice a difference with oral intake  He denies blood in the stool or black tarry stools  He reports they are mushy and very occasionally watery  He denies every having formed stools   He was started on questran powder after last office visit and reports that he did not tolerate this and it made his stomach upset  He does not take anything for the diarrhea and reports he got used to it  He is on chronic oxygen during the day  He reports that he is fine at night and does not require it when lying down, only when up moving around  Patient denies any nausea, vomiting, abdominal pain, dysphagia, unexpected weight loss, diarrhea, constipation, blood in stool, or black tarry stools  Patient's last colonoscopy was in 2017 with polyp removed but random biopsies to rule out microscopic colitis were not obtained at that time  Patient's last endoscopy was November 2018 which showed 2 cm hiatal hernia and benign polyp removed  Fortunately patient denies any weight loss and has actually gained a few pounds       Review of Systems:   General: negative for fatigue, fever, night sweats or unexpected weight loss  Psychological: negative for anxiety or depression  Ophthalmic: negative for blurry vision or scleral icterus  ENT: negative for headaches, oral lesions, sore throat, vocal changes or dysphagia  Hematological and Lymphatic: negative for pallor or swollen lymph nodes  Respiratory: negative for cough, +shortness of breath  Cardiovascular: negative for chest pain, edema or murmur  Gastrointestinal: as mentioned in HPI  Genito-Urinary: negative for dysuria or incontinence  Musculoskeletal: negative for joint pain, joint stiffness or joint swelling  Dermatological: negative for pruritus, rash, or jaundice    Current Medications  Current Outpatient Medications   Medication Sig Dispense Refill    albuterol (2 5 mg/3 mL) 0 083 % nebulizer solution Inhale 1 each every 4 (four) hours as needed      albuterol (PROVENTIL HFA,VENTOLIN HFA) 90 mcg/act inhaler Inhale 2 puffs 4 (four) times a day 1 Inhaler 5    amLODIPine (NORVASC) 10 mg tablet TAKE ONE TABLET BY MOUTH ONE TIME DAILY  30 tablet 4    finasteride (PROSCAR) 5 mg tablet Take 5 mg by mouth every morning        fluticasone (FLONASE) 50 mcg/act nasal spray USE TWO SPRAYS IN EACH NOSTRIL EVERY DAY  16 g 4    gabapentin (NEURONTIN) 300 mg capsule TAKE ONE CAPSULE BY MOUTH THREE TIMES DAILY  90 capsule 2    ipratropium-albuterol (DUO-NEB) 0 5-2 5 mg/3 mL nebulizer solution Take 1 vial (3 mL total) by nebulization 4 (four) times a day 60 vial 0    nystatin (MYCOSTATIN) cream Apply topically 2 (two) times a day 30 g 2    OXYGEN-HELIUM IN Inhale 2L at rest- 3L with activity      pantoprazole (PROTONIX) 40 mg tablet Take 1 tablet (40 mg total) by mouth 2 (two) times a day 60 tablet 5    ranitidine (ZANTAC) 150 mg tablet Take 1 tablet (150 mg total) by mouth daily 30 tablet 3    tamsulosin (FLOMAX) 0 4 mg TAKE ONE CAPSULE BY MOUTH ONE TIME DAILY  30 capsule 3    TRELEGY ELLIPTA 100-62 5-25 MCG/INH inhaler INHALE ONE PUFF BY MOUTH ONE TIME DAILY  rinse mouth after use  60 each 4    ZEMAIRA injection Once a week-       zolpidem (AMBIEN) 10 mg tablet TAKE ONE TABLET BY MOUTH NIGHTLY AT BEDTIME  30 tablet 5    bisacodyl (DULCOLAX) 5 mg EC tablet Take 2 tablets (10 mg total) by mouth once for 1 dose Per office instructions 2 tablet 0    colestipol (COLESTID) 1 g tablet Take 1 tablet (1 g total) by mouth 2 (two) times a day 60 tablet 5    gabapentin (NEURONTIN) 300 mg capsule Take 1 capsule (300 mg total) by mouth 3 (three) times a day (Patient not taking: Reported on 10/11/2019) 90 capsule 3    polyethylene glycol (GOLYTELY) 4000 mL solution Take 4,000 mL by mouth once for 1 dose Drink over 3-4 hours starting at 4 PM the evening before your procedure 4000 mL 0     No current facility-administered medications for this visit          Past Medical History  Past Medical History:   Diagnosis Date    Anesthesia complication     Difficult to wake up    Arthritis     BPH (benign prostatic hyperplasia)     urinary frequency    Cancer (HCC)     basal cell neck, face    COPD (chronic obstructive pulmonary disease) ([facility] [address]     Full dentures     Hiatal hernia     Hypertension     controlled    Irritable bowel syndrome     Kidney stone     at least 7 episodes    Liver disease     Alpha 1- enzyme deficiency - diagnosed   has been on weekly replacement therapy since then    Pulmonary emphysema ([facility] [address] )     1/25/15  FEV1 - 2 45 liters or 59% of predicted    RSV infection 2017    Wears glasses     for driving only       Past Surgical History  Past Surgical History:   Procedure Laterality Date    BACK SURGERY      discectomy    COLONOSCOPY      COLONOSCOPY N/A 3/10/2017    Procedure: Tiago Stringer;  Surgeon: David Reyes MD;  Location: [facility] GI LAB; Service:    Elisabeth Cocks      removal of kidney stones    ESOPHAGOGASTRODUODENOSCOPY N/A 3/10/2017    Procedure: ESOPHAGOGASTRODUODENOSCOPY (EGD); Surgeon: David Reyes MD;  Location: [facility] GI LAB; Service:     LITHOTRIPSY      SC ESOPHAGOGASTRODUODENOSCOPY TRANSORAL DIAGNOSTIC N/A 2018    Procedure: ESOPHAGOGASTRODUODENOSCOPY (EGD); Surgeon: David Reyes MD;  Location: [facility] GI LAB; Service: Gastroenterology    TONSILLECTOMY      VEIN LIGATION AND STRIPPING Bilateral            Past Social History   Social History     Socioeconomic History    Marital status:       Spouse name: None    Number of children: None    Years of education: None    Highest education level: None   Occupational History    None   Social Needs    Financial resource strain: None    Food insecurity:     Worry: None     Inability: None    Transportation needs:     Medical: None     Non-medical: None   Tobacco Use    Smoking status: Former Smoker     Packs/day: 1 00     Years: 60 00     Pack years: 60 00     Last attempt to quit: 2017     Years since quittin 1    Smokeless tobacco: Never Used    Tobacco comment: quit in 2017   Substance and Sexual Activity    Alcohol use: Not Currently     Comment: stopped in     Drug use: No    Sexual activity: None   Lifestyle    Physical activity:     Days per week: None     Minutes per session: None    Stress: None   Relationships    Social connections:     Talks on phone: None     Gets together: None     Attends Sabianism service: None     Active member of club or organization: None     Attends meetings of clubs or organizations: None     Relationship status: None    Intimate partner violence:     Fear of current or ex partner: None     Emotionally abused: None     Physically abused: None     Forced sexual activity: None   Other Topics Concern    None   Social History Narrative    None       The following portions of the patient's history were reviewed and updated as appropriate: allergies, current medications, past family history, past medical history, past social history, past surgical history and problem list     Vital Signs  Vitals:    10/11/19 1057   BP: 154/82   BP Location: Right arm   Patient Position: Sitting   Cuff Size: Standard   Pulse: 84   Temp: 98 7 °F (37 1 °C)   Weight: 89 9 kg (198 lb 2 oz)   Height: 6' 5" (1 956 m)       Physical Exam:  General appearance: alert, cooperative, no distress; chronically ill appearing  HEENT: normocephalic, anicteric, no eye erythema or discharge, no oropharyngeal thrush  Neck: supple, trachea midline, no adenopathy  Lungs: decreased breath sounds b/l, no rales, rhonchi, or wheezing, unlabored respirations; on NC oxygen  Heart: RRR, no murmur, rubs, or gallops  Abdomen: soft, non-tender, non-distended, normal bowel sounds, no masses or organomegaly  Rectal: deferred  Extremities: no cyanosis, clubbing, or edema  Musculoskeletal: uses cane  Skin: color and texture normal, no jaundice, no rashes or lesions  Psychiatric: alert and oriented, normal affect and behavior

## 2019-10-11 NOTE — TELEPHONE ENCOUNTER
Patients GI provider:  Dr Bobbi Corona    Number to return call: ([phone]    Reason for call: Pt calling stating he had a nuclear test done yesterday       Scheduled procedure/appointment date if applicable:

## 2019-10-14 ENCOUNTER — OFFICE VISIT (OUTPATIENT)
Dept: PULMONOLOGY | Facility: MEDICAL CENTER | Age: 74
End: 2019-10-14
Payer: MEDICARE

## 2019-10-14 VITALS
HEIGHT: 77 IN | BODY MASS INDEX: 22.91 KG/M2 | TEMPERATURE: 98.7 F | WEIGHT: 194 LBS | HEART RATE: 82 BPM | DIASTOLIC BLOOD PRESSURE: 78 MMHG | SYSTOLIC BLOOD PRESSURE: 152 MMHG | OXYGEN SATURATION: 95 % | RESPIRATION RATE: 20 BRPM

## 2019-10-14 DIAGNOSIS — J96.11 CHRONIC RESPIRATORY FAILURE WITH HYPOXIA (HCC): ICD-10-CM

## 2019-10-14 DIAGNOSIS — R91.8 LUNG NODULES: ICD-10-CM

## 2019-10-14 DIAGNOSIS — J43.2 CENTRILOBULAR EMPHYSEMA (HCC): ICD-10-CM

## 2019-10-14 DIAGNOSIS — R06.00 DYSPNEA ON EXERTION: Primary | ICD-10-CM

## 2019-10-14 DIAGNOSIS — Z01.818 PREOPERATIVE EXAMINATION: ICD-10-CM

## 2019-10-14 PROCEDURE — 94010 BREATHING CAPACITY TEST: CPT | Performed by: NURSE PRACTITIONER

## 2019-10-14 PROCEDURE — 99214 OFFICE O/P EST MOD 30 MIN: CPT | Performed by: NURSE PRACTITIONER

## 2019-10-14 RX ORDER — POLYETHYLENE GLYCOL-3350 AND ELECTROLYTES 236; 6.74; 5.86; 2.97; 22.74 G/274.31G; G/274.31G; G/274.31G; G/274.31G; G/274.31G
POWDER, FOR SOLUTION ORAL
COMMUNITY
Start: 2019-10-11 | End: 2019-10-29 | Stop reason: ALTCHOICE

## 2019-10-14 NOTE — PATIENT INSTRUCTIONS
Emphysema   WHAT YOU NEED TO KNOW:   Emphysema is a long-term lung disease that is most commonly caused by smoking  It is part of a group of lung diseases called chronic obstructive pulmonary disease (COPD)  Emphysema damages the alveoli (air sacs) in your lungs  This makes it hard for your lungs to send oxygen to the rest of your body  DISCHARGE INSTRUCTIONS:   Call 911 if:   · You have pain, pressure, or fullness in your chest that lasts more than a few minutes or returns  · You have pain or discomfort in your back, neck, jaw, stomach, or arm  Return to the emergency department if:   · You have shortness of breath that is so severe you cannot talk  · You have a sudden cold sweat  · You cough up blood  · You are confused, dizzy, or feel like you may pass out  Contact your healthcare provider if:   · You have a fever  · You have trouble doing your usual activities because it is hard to breathe  · You need to use your inhalers or take breathing treatments more often than usual      · You cough up more sputum than is normal for you  · You wheeze more than is normal for you  · Your legs or ankles are swollen  · You have questions or concerns about your condition or care  Do not smoke: If you smoke, it is never too late to quit  Quitting smoking will improve your health and the health of those around you  If you smoke, ask for information about how to stop  Medicines:   · Bronchodilators  help open your airway so you can breathe better  They are most often taken through one of the following devices:     ¨ An inhaler  is a handheld device that delivers medicine that you breathe in  ¨ A nebulizer  is a machine that turns liquid medicine into mist that you breathe in through a mouthpiece  · Steroids  decrease swelling in your lungs  They may be inhaled or taken as a pill  · Take your medicine as directed    Contact your healthcare provider if you think your medicine is not helping or if you have side effects  Tell him or her if you are allergic to any medicine  Keep a list of the medicines, vitamins, and herbs you take  Include the amounts, and when and why you take them  Bring the list or the pill bottles to follow-up visits  Carry your medicine list with you in case of an emergency  Follow up with your healthcare provider or pulmonologist as directed:  Write down your questions so you remember to ask them during your visits  Emphysema exacerbation:  An exacerbation is when your symptoms suddenly get worse  You may have a harder time breathing, your cough may get worse, and you may cough up more sputum  You may have a fever, increased heart rate, or feel sleepy  An exacerbation may be caused by a lung infection, air pollution, or other lung irritants  Sometimes the cause of an exacerbation is unknown  Your healthcare provider or pulmonologist may change your treatment to help relieve exacerbations  Manage your emphysema and help prevent an exacerbation:   · Avoid irritants  Wear protective gear if your workplace has dust or chemicals that bother you  Stay inside when air quality is bad  · Seek treatment  Get early treatment if your symptoms are getting worse  This may help you recover faster  Know what to do in case of an exacerbation  · Exercise daily  Talk to your healthcare provider about the best exercise plan for you  Exercise can help decrease breathing problems and improve your health  · Use pursed-lip breathing  Pursed-lip breathing can be used any time you feel short of breath  It can be especially helpful before you start an activity  ¨ Count to 2 while you take a deep breath in through your nose  ¨ Slowly breathe out through your mouth with your lips slightly puckered  You should make a quiet hissing sound as you breathe out      ¨ Repeat this exercise 4 or 5 times a day Once you are used to doing pursed-lip breathing, you can use it any time you need more air  · Use sleep positions that help you breathe better  Sleep with your upper body raised if you have trouble breathing when you lie down  Use foam wedges or elevate the head of your bed  Use a device that will tilt your whole body, or bend your body at the waist  The device should not bend your body at the upper back or neck  You may sleep better in a recliner  · Ask your healthcare provider about the flu and pneumonia vaccines  All adults should get the flu (influenza) vaccine every year as soon as it becomes available  The pneumonia vaccine is given to adults aged 72 or older to prevent pneumococcal disease, such as pneumonia  Adults aged 23 to 59 years who are at high risk for pneumococcal disease also should get the pneumococcal vaccine  It may need to be repeated 1 or 5 years later  © 2017 [employer] Information is for End User's use only and may not be sold, redistributed or otherwise used for commercial purposes  All illustrations and images included in CareNotes® are the copyrighted property of [employer]  or Tyrone Walsh  The above information is an  only  It is not intended as medical advice for individual conditions or treatments  Talk to your doctor, nurse or pharmacist before following any medical regimen to see if it is safe and effective for you

## 2019-10-14 NOTE — ASSESSMENT & PLAN NOTE
Patient had a CT of chest with IV contrast October 1, 2019  Severe emphysematous changes are present  There was a new 4 mm left upper lobe nodule with no additional new nodules  Soft tissue density at the bifurcation of left lower lobe pulmonary nodule is noted  This is stable  He also has hiatal hernia with stable thickening of the distal esophagus this is likely secondary to reflux    Repeat CT of chest will be done in October of 2020

## 2019-10-14 NOTE — PROGRESS NOTES
Assessment/Plan:     Problem List Items Addressed This Visit        Respiratory    Chronic respiratory failure with hypoxia ([facility] [address] )     Patient has chronic respiratory failure with hypoxia  Room air oxygen at rest is 91% and with ambulation went to 80 7%  With 2 L of oxygen at rest is 97% with 2 L ambulation 93%  Patient continues to use an benefit with portable concentrator  He is oxygen dependent  Centrilobular emphysema ([facility] [address] )     Patient has severe centrilobular emphysema  He also has alpha 1 antitrypsin deficiency  He did have pulmonary function test done today that show very read similar results to PFT that was done in March 2018  Forced vital capacity was 3 94 L or 72% of predicted, FEV1 was 1 78 L or 44% obstruction ratio 45%  Flow volume loop shows severe airway obstructive pattern  His PFTs that were done in March 2018 show very similar results at that time forced vital capacity was 4 48 L or 81% of predicted, FEV1 was 1 95 L or 48% of predicted obstruction ratio  43%  He is currently on optimal therapy  He is now using combination inhaled corticosteroid anticholinergic and long-acting beta agonist   This is called Trelegy 1 puff daily  He also has bronchodilator therapy with nebulizer and albuterol  He uses it twice a day  He appears to be stable at this time  I personally reviewed images of CT of chest with patient  Severe centrilobular emphysema was noted  Other    Lung nodules     Patient had a CT of chest with IV contrast October 1, 2019  Severe emphysematous changes are present  There was a new 4 mm left upper lobe nodule with no additional new nodules  Soft tissue density at the bifurcation of left lower lobe pulmonary nodule is noted  This is stable  He also has hiatal hernia with stable thickening of the distal esophagus this is likely secondary to reflux    Repeat CT of chest will be done in October of 2020           Preoperative examination     Based on [facility] score for postoperative pulmonary complications patient is deemed to be low risk  This is based on patient's age his preoperative pulse oximetry, the fact that he has not had a respiratory infection previous to last month for proposed procedure  He is not anemic  There is no incision anticipated, duration of surgery is less than 2 hours and this is not emergent  This is 11 points on the scoring system which puts him in a low risk for complications that include infection pleural effusion pneumothorax  However patient does have severe centrilobular emphysema that was seen on CT of chest and also alpha 1 antitrypsin deficiency  He should be able to tolerate this procedure  He is going to have both colonoscopy an EGD  According to patient this is going to be done as outpatient in the hospital   I would support this decision as he likely is at higher risk due to his severe centrilobular emphysema  He should be able to tolerate the procedure           Other Visit Diagnoses     Dyspnea on exertion    -  Primary    Relevant Orders    POCT spirometry (Completed)            Return in about 3 months (around 1/14/2020)  All questions are answered to the patient's satisfaction and understanding  He verbalizes understanding  He is encouraged to call with any further questions or concerns  Portions of the record may have been created with voice recognition software  Occasional wrong word or "sound a like" substitutions may have occurred due to the inherent limitations of voice recognition software  Read the chart carefully and recognize, using context, where substitutions have occurred  Electronically Signed by TOPHER Chambers  HPI:  Francis Cross is a 42-year-old male with history of alpha-1 antitrypsin deficiency  He continues to receive weekly treatments with replacement  He has a history of centrilobular emphysema as well as chronic respiratory failure with hypoxia    He was last seen in the office July 22, 2019  Last PFT was done in March of 2018 for which severe airway obstruction was noted  At that time his forced vital capacity was 4 48 L or 81% of predicted, FEV1 was 1 95 L or 48% of predicted and obstruction ratio 43%  ______________________________________________________________________    Chief Complaint: No chief complaint on file  Patient ID: Laron Tate is a 75 y o  y o  male has a past medical history of Anesthesia complication, Arthritis, BPH (benign prostatic hyperplasia), Cancer ([facility] [address] ), [facility]chronic obstructive pulmonary disease) [facility] Full dentures, Hiatal hernia, Hypertension, Irritable bowel syndrome, Kidney stone, Liver disease, Pulmonary emphysema ([facility] [address] ), RSV infection (12/2017), and Wears glasses  10/14/2019  Patient presents today for follow-up visit  Shortness of Breath   This is a chronic problem  The current episode started more than 1 year ago  The problem occurs daily  The problem has been waxing and waning  The symptoms are aggravated by exercise  The patient has no known risk factors for DVT/PE  He has tried beta agonist inhalers, ipratropium inhalers, rest and steroid inhalers for the symptoms  The treatment provided mild relief  His past medical history is significant for COPD  Cough   This is a chronic problem  The current episode started more than 1 year ago  The problem has been unchanged  The problem occurs every few hours  The cough is productive of sputum  Associated symptoms include shortness of breath  The symptoms are aggravated by exercise  Risk factors for lung disease include smoking/tobacco exposure  He has tried a beta-agonist inhaler, steroid inhaler, rest and ipratropium inhaler for the symptoms  The treatment provided mild relief  His past medical history is significant for COPD and emphysema  Review of Systems   Constitutional: Negative  HENT: Negative  Eyes: Negative  Respiratory: Positive for cough and shortness of breath      Cardiovascular: Negative  Gastrointestinal: Negative  Endocrine: Negative  Genitourinary: Negative  Musculoskeletal: Negative  Skin: Negative  Allergic/Immunologic: Negative  Neurological: Negative  Hematological: Negative  Psychiatric/Behavioral: Negative  Smoking history: He reports that he quit smoking about 2 years ago  He has a 60 00 pack-year smoking history   He has never used smokeless tobacco     The following portions of the patient's history were reviewed and updated as appropriate: allergies, current medications, past family history, past medical history, past social history, past surgical history and problem list     Immunization History   Administered Date(s) Administered    INFLUENZA 09/27/2016, 09/27/2018    Influenza TIV (IM) 09/27/2013, 10/23/2014    Pneumococcal Conjugate 13-Valent 04/26/2016    Pneumococcal Polysaccharide PPV23 04/23/2011    Tdap 04/26/2016    Zoster 10/29/2014, 04/27/2015, 07/24/2018    Zoster Vaccine Recombinant 05/24/2018, 07/24/2018     Current Outpatient Medications   Medication Sig Dispense Refill    albuterol (2 5 mg/3 mL) 0 083 % nebulizer solution Inhale 1 each every 4 (four) hours as needed      albuterol (PROVENTIL HFA,VENTOLIN HFA) 90 mcg/act inhaler Inhale 2 puffs 4 (four) times a day 1 Inhaler 5    amLODIPine (NORVASC) 10 mg tablet TAKE ONE TABLET BY MOUTH ONE TIME DAILY  30 tablet 4    colestipol (COLESTID) 1 g tablet Take 1 tablet (1 g total) by mouth 2 (two) times a day 60 tablet 5    finasteride (PROSCAR) 5 mg tablet Take 5 mg by mouth every morning        fluticasone (FLONASE) 50 mcg/act nasal spray USE TWO SPRAYS IN EACH NOSTRIL EVERY DAY  16 g 4    gabapentin (NEURONTIN) 300 mg capsule TAKE ONE CAPSULE BY MOUTH THREE TIMES DAILY  90 capsule 2    GAVILYTE-G 236 g solution       ipratropium-albuterol (DUO-NEB) 0 5-2 5 mg/3 mL nebulizer solution Take 1 vial (3 mL total) by nebulization 4 (four) times a day 60 vial 0    nystatin (MYCOSTATIN) cream Apply topically 2 (two) times a day 30 g 2    OXYGEN-HELIUM IN Inhale 2L at rest- 3L with activity      pantoprazole (PROTONIX) 40 mg tablet Take 1 tablet (40 mg total) by mouth 2 (two) times a day 60 tablet 5    tamsulosin (FLOMAX) 0 4 mg TAKE ONE CAPSULE BY MOUTH ONE TIME DAILY  30 capsule 3    TRELEGY ELLIPTA 100-62 5-25 MCG/INH inhaler INHALE ONE PUFF BY MOUTH ONE TIME DAILY  rinse mouth after use  60 each 4    ZEMAIRA injection Once a week-       zolpidem (AMBIEN) 10 mg tablet TAKE ONE TABLET BY MOUTH NIGHTLY AT BEDTIME  30 tablet 5    bisacodyl (DULCOLAX) 5 mg EC tablet Take 2 tablets (10 mg total) by mouth once for 1 dose Per office instructions 2 tablet 0    gabapentin (NEURONTIN) 300 mg capsule Take 1 capsule (300 mg total) by mouth 3 (three) times a day (Patient not taking: Reported on 10/11/2019) 90 capsule 3    polyethylene glycol (GOLYTELY) 4000 mL solution Take 4,000 mL by mouth once for 1 dose Drink over 3-4 hours starting at 4 PM the evening before your procedure 4000 mL 0    ranitidine (ZANTAC) 150 mg tablet Take 1 tablet (150 mg total) by mouth daily 30 tablet 3     No current facility-administered medications for this visit  Allergies: Chantix [varenicline]    Objective:  Vitals:    10/14/19 0824   BP: 152/78   BP Location: Left arm   Patient Position: Sitting   Cuff Size: Standard   Pulse: 82   Resp: 20   Temp: 98 7 °F (37 1 °C)   TempSrc: Tympanic   SpO2: 95%   Weight: 88 kg (194 lb)   Height: 6' 5" (1 956 m)   Oxygen Therapy  SpO2: 95 %(3 L rest)    Wt Readings from Last 3 Encounters:   10/14/19 88 kg (194 lb)   10/11/19 89 9 kg (198 lb 2 oz)   10/03/19 88 kg (194 lb)     Body mass index is 23 01 kg/m²  Physical Exam   Constitutional: He is oriented to person, place, and time  He appears well-developed and well-nourished  HENT:   Head: Normocephalic and atraumatic  Mallampati 4   Eyes: Pupils are equal, round, and reactive to light   EOM are normal  Neck: Normal range of motion  Neck supple  Cardiovascular: Normal rate and regular rhythm  Pulmonary/Chest: He is in respiratory distress  Abdominal: Soft  Musculoskeletal: Normal range of motion  Neurological: He is alert and oriented to person, place, and time  Skin: Skin is warm and dry  Capillary refill takes less than 2 seconds  Psychiatric: He has a normal mood and affect   His behavior is normal        Lab Review:   Appointment on 08/29/2019   Component Date Value    WBC 08/29/2019 6 55     RBC 08/29/2019 4 13     Hemoglobin 08/29/2019 13 3     Hematocrit 08/29/2019 40 0     MCV 08/29/2019 97     MCH 08/29/2019 32 2     MCHC 08/29/2019 33 3     RDW 08/29/2019 14 2     MPV 08/29/2019 9 5     Platelets 25/90/4330 186     nRBC 08/29/2019 0     Neutrophils Relative 08/29/2019 52     Immat GRANS % 08/29/2019 1     Lymphocytes Relative 08/29/2019 30     Monocytes Relative 08/29/2019 10     Eosinophils Relative 08/29/2019 6     Basophils Relative 08/29/2019 1     Neutrophils Absolute 08/29/2019 3 49     Immature Grans Absolute 08/29/2019 0 03     Lymphocytes Absolute 08/29/2019 1 96     Monocytes Absolute 08/29/2019 0 62     Eosinophils Absolute 08/29/2019 0 36     Basophils Absolute 08/29/2019 0 09     Sodium 08/29/2019 142     Potassium 08/29/2019 4 3     Chloride 08/29/2019 110*    CO2 08/29/2019 25     ANION GAP 08/29/2019 7     BUN 08/29/2019 13     Creatinine 08/29/2019 1 28     Glucose 08/29/2019 104     Calcium 08/29/2019 9 5     AST 08/29/2019 21     ALT 08/29/2019 40     Alkaline Phosphatase 08/29/2019 66     Total Protein 08/29/2019 7 1     Albumin 08/29/2019 4 1     Total Bilirubin 08/29/2019 0 90     eGFR 08/29/2019 54     Magnesium 08/29/2019 2 3     Hemoglobin A1C 08/29/2019 5 2     EAG 08/29/2019 103    Appointment on 07/30/2019   Component Date Value    WBC 07/30/2019 6 26     RBC 07/30/2019 3 91     Hemoglobin 07/30/2019 12 7     Hematocrit 07/30/2019 37 0     MCV 07/30/2019 95     MCH 07/30/2019 32 5     MCHC 07/30/2019 34 3     RDW 07/30/2019 13 4     MPV 07/30/2019 8 8*    Platelets 31/74/1689 245     nRBC 07/30/2019 0     Neutrophils Relative 07/30/2019 57     Immat GRANS % 07/30/2019 1     Lymphocytes Relative 07/30/2019 26     Monocytes Relative 07/30/2019 9     Eosinophils Relative 07/30/2019 6     Basophils Relative 07/30/2019 1     Neutrophils Absolute 07/30/2019 3 56     Immature Grans Absolute 07/30/2019 0 09     Lymphocytes Absolute 07/30/2019 1 60     Monocytes Absolute 07/30/2019 0 58     Eosinophils Absolute 07/30/2019 0 37     Basophils Absolute 07/30/2019 0 06     Vitamin B2 07/31/2019 161    Appointment on 07/17/2019   Component Date Value    WBC 07/17/2019 8 62     RBC 07/17/2019 4 35     Hemoglobin 07/17/2019 14 1     Hematocrit 07/17/2019 42 6     MCV 07/17/2019 98     MCH 07/17/2019 32 4     MCHC 07/17/2019 33 1     RDW 07/17/2019 13 6     MPV 07/17/2019 9 1     Platelets 87/60/9232 107*    nRBC 07/17/2019 0     Neutrophils Relative 07/17/2019 68     Immat GRANS % 07/17/2019 2     Lymphocytes Relative 07/17/2019 18     Monocytes Relative 07/17/2019 7     Eosinophils Relative 07/17/2019 4     Basophils Relative 07/17/2019 1     Neutrophils Absolute 07/17/2019 5 94     Immature Grans Absolute 07/17/2019 0 15     Lymphocytes Absolute 07/17/2019 1 52     Monocytes Absolute 07/17/2019 0 64     Eosinophils Absolute 07/17/2019 0 32     Basophils Absolute 07/17/2019 0 05     Sodium 07/17/2019 138     Potassium 07/17/2019 4 2     Chloride 07/17/2019 101     CO2 07/17/2019 28     ANION GAP 07/17/2019 9     BUN 07/17/2019 14     Creatinine 07/17/2019 1 10     Glucose, Fasting 07/17/2019 100*    Calcium 07/17/2019 8 4     AST 07/17/2019 25     ALT 07/17/2019 85*    Alkaline Phosphatase 07/17/2019 64     Total Protein 07/17/2019 6 8     Albumin 07/17/2019 3 6     Total Bilirubin 07/17/2019 1 10*    eGFR 07/17/2019 65     TSH 3RD GENERATON 07/17/2019 2 371     Vitamin B-12 07/17/2019 376     Folate 07/17/2019 13 1    No results displayed because visit has over 200 results        Appointment on 06/26/2019   Component Date Value    Hemoglobin A1C 06/26/2019 5 4     EAG 06/26/2019 108    Appointment on 06/20/2019   Component Date Value    Sodium 06/20/2019 144     Potassium 06/20/2019 3 8     Chloride 06/20/2019 106     CO2 06/20/2019 28     ANION GAP 06/20/2019 10     BUN 06/20/2019 18     Creatinine 06/20/2019 1 28     Glucose, Fasting 06/20/2019 104*    Calcium 06/20/2019 8 9     AST 06/20/2019 20     ALT 06/20/2019 27     Alkaline Phosphatase 06/20/2019 70     Total Protein 06/20/2019 7 2     Albumin 06/20/2019 3 7     Total Bilirubin 06/20/2019 0 90     eGFR 06/20/2019 54    Lab Requisition on 06/12/2019   Component Date Value    Wound Culture 06/12/2019 3+ Growth of Staphylococcus aureus*    Gram Stain Result 06/12/2019 No Polys*    Gram Stain Result 06/12/2019 1+ Gram positive cocci in pairs*   Appointment on 06/11/2019   Component Date Value    WBC 06/11/2019 8 02     RBC 06/11/2019 4 28     Hemoglobin 06/11/2019 13 8     Hematocrit 06/11/2019 40 8     MCV 06/11/2019 95     MCH 06/11/2019 32 2     MCHC 06/11/2019 33 8     RDW 06/11/2019 13 2     MPV 06/11/2019 9 3     Platelets 96/79/1667 215     nRBC 06/11/2019 0     Neutrophils Relative 06/11/2019 59     Immat GRANS % 06/11/2019 1     Lymphocytes Relative 06/11/2019 25     Monocytes Relative 06/11/2019 10     Eosinophils Relative 06/11/2019 4     Basophils Relative 06/11/2019 1     Neutrophils Absolute 06/11/2019 4 85     Immature Grans Absolute 06/11/2019 0 04     Lymphocytes Absolute 06/11/2019 1 98     Monocytes Absolute 06/11/2019 0 76     Eosinophils Absolute 06/11/2019 0 32     Basophils Absolute 06/11/2019 0 07     Sodium 06/11/2019 139     Potassium 06/11/2019 4 2     Chloride 06/11/2019 103     CO2 06/11/2019 26     ANION GAP 06/11/2019 10     BUN 06/11/2019 12     Creatinine 06/11/2019 1 37*    Glucose 06/11/2019 99     Calcium 06/11/2019 9 2     AST 06/11/2019 16     ALT 06/11/2019 29     Alkaline Phosphatase 06/11/2019 79     Total Protein 06/11/2019 7 4     Albumin 06/11/2019 3 9     Total Bilirubin 06/11/2019 1 00     eGFR 06/11/2019 50     Sed Rate 06/11/2019 14*   Appointment on 05/23/2019   Component Date Value    C difficile toxin by PCR 05/23/2019 NEGATIVE for C difficle toxin by PCR   Salmonella sp PCR 05/23/2019 None Detected     Shigella sp/Enteroinvasi* 05/23/2019 None Detected     Campylobacter sp (jejuni* 05/23/2019 None Detected     Shiga toxin 1/Shiga toxi* 05/23/2019 None Detected     White Blood Cells, Stool 05/23/2019 No white blood cells seen   Ova + Parasite Exam 05/23/2019 No ova, cysts, or parasites seen     One negative specimen does not rule out the possibility of a  parasitic infection  Diagnostics:  I have personally reviewed pertinent films in PACS    Office Spirometry Results:  FEV1: 1 78 liters(44%)  FVC: 3 94 liters(72%)  FEV1/FVC: 45 2 %  ESS:    Ct Soft Tissue Neck W Contrast    Result Date: 10/10/2019  Narrative: CT NECK WITH CONTRAST INDICATION:   R22 1: Localized swelling, mass and lump, neck  COMPARISON:  2/12/2018 PET/CT; 10/1/2019 chest CT TECHNIQUE:  Axial, sagittal, and coronal 2D reformatted images were created from the axial source data and submitted for interpretation  Radiation dose length product (DLP) for this visit:  651 15 mGy-cm   This examination, like all CT scans performed in the [facility], was performed utilizing techniques to minimize radiation dose exposure, including the use of iterative  reconstruction and automated exposure control  IV Contrast:  85 mL of iohexol (OMNIPAQUE) IMAGE QUALITY:  Diagnostic  FINDINGS: VISUALIZED BRAIN PARENCHYMA:  No acute intracranial pathology of the visualized brain parenchyma  VISUALIZED ORBITS AND PARANASAL SINUSES:  Normal  NASAL CAVITY AND NASOPHARYNX:  Normal  SUPRAHYOID NECK:  Normal oral cavity, tongue base, tonsillar fossa and epiglottis  INFRAHYOID NECK:  Aryepiglottic folds and piriform sinuses are normal   Normal glottis and subglottic airway  THYROID GLAND:  Unremarkable  PAROTID AND SUBMANDIBULAR GLANDS:  Normal  LYMPH NODES:  No pathologic or enlarged adenopathy  VASCULAR STRUCTURES:  Normal enhancement of the cervical vasculature  THORACIC INLET:  Lung apices and upper mediastinum are unremarkable  Mild esophageal wall thickening suspicious for reflux esophagitis, consistent with prior chest CT  Diffuse severe emphysema again noted BONY STRUCTURES: No acute fracture or destructive osseous lesion  Moderate degenerative spondylosis C5-6, C6-7     Impression: No abnormal masses or fluid collections identified in the neck Mild esophageal wall thickening suspicious for reflux esophagitis Multilevel degenerative cervical spondylosis Diffuse severe emphysema again evident Workstation performed: [id number]     Ct Chest With Contrast    Result Date: 10/2/2019  Narrative: CT CHEST WITH IV CONTRAST INDICATION:   R91 8: Other nonspecific abnormal finding of lung field  COMPARISON:  06/30/2019, 10/11/2018 TECHNIQUE: CT examination of the chest was performed  Axial, sagittal, and coronal 2D reformatted images were created from the source data and submitted for interpretation  Radiation dose length product (DLP) for this visit:  392 mGy-cm   This examination, like all CT scans performed in the [facility], was performed utilizing techniques to minimize radiation dose exposure, including the use of iterative reconstruction and automated exposure control  IV Contrast:  100 mL of iohexol (OMNIPAQUE) FINDINGS: LUNGS:  Severe emphysematous changes are present    A bulla is seen at the right lung base  New 4 mm left upper lobe nodule, image 14 series 3  No additional new nodules  Chronic airspace disease in the right lower lobe  Stable soft tissue density at the bifurcation of the left lower lobe pulmonary artery  PLEURA:  No pleural effusion  Stable pleural-based calcifications on the right  HEART/GREAT VESSELS:  Unremarkable for patient's age  MEDIASTINUM AND MIKE:  Hiatal hernia  There is some thickening of the distal esophagus    CHEST WALL AND LOWER NECK:   Unremarkable  VISUALIZED STRUCTURES IN THE UPPER ABDOMEN:  There is fatty change in the liver  Cyst in the upper pole of the left kidney    OSSEOUS STRUCTURES:  No acute fracture or destructive osseous lesion  Impression: 1  Severe emphysema  2   New 4 mm nodule in the left upper lobe  Recommend CT scan of the chest in 12 months time for follow-up in this high risk patient based on Fleischner Society guidelines  3   Stable soft tissue density at the bifurcation of the left lower lobe pulmonary artery  4   Hiatal hernia with stable thickening of the distal esophagus  This may be on the basis of reflux  The study was marked in EPIC for significant notification   Workstation performed: [id number]

## 2019-10-14 NOTE — ASSESSMENT & PLAN NOTE
Based on ARISCAT score for postoperative pulmonary complications patient is deemed to be low risk  This is based on patient's age his preoperative pulse oximetry, the fact that he has not had a respiratory infection previous to last month for proposed procedure  He is not anemic  There is no incision anticipated, duration of surgery is less than 2 hours and this is not emergent  This is 11 points on the scoring system which puts him in a low risk for complications that include infection pleural effusion pneumothorax  However patient does have severe centrilobular emphysema that was seen on CT of chest and also alpha 1 antitrypsin deficiency  He should be able to tolerate this procedure  He is going to have both colonoscopy an EGD  According to patient this is going to be done as outpatient in the hospital   I would support this decision as he likely is at higher risk due to his severe centrilobular emphysema    He should be able to tolerate the procedure

## 2019-10-14 NOTE — TELEPHONE ENCOUNTER
Rajiv Mills, I think that scheduling in the hospital is good idea  I saw him today and he is stable  According to ARISCAT scoring (you can look it up, great tool), he is at low risk for pulmonary complication  But he is obviously at higher risk due to his oxygen requirements and severe centrilobular emphysema  You can refer to my office not from today  Dorma Dates, I do not "clear" patients, but rather evaluate  He also reports that drinking gallon of prep would be difficult for him  He has not been able to do it in the past   If there another alternative as drinking a lot of fluid likely will create more respiratory distress    Thanks, fine

## 2019-10-14 NOTE — ASSESSMENT & PLAN NOTE
Patient has severe centrilobular emphysema  He also has alpha 1 antitrypsin deficiency  He did have pulmonary function test done today that show very read similar results to PFT that was done in March 2018  Forced vital capacity was 3 94 L or 72% of predicted, FEV1 was 1 78 L or 44% obstruction ratio 45%  Flow volume loop shows severe airway obstructive pattern  His PFTs that were done in March 2018 show very similar results at that time forced vital capacity was 4 48 L or 81% of predicted, FEV1 was 1 95 L or 48% of predicted obstruction ratio  43%  He is currently on optimal therapy  He is now using combination inhaled corticosteroid anticholinergic and long-acting beta agonist   This is called Trelegy 1 puff daily  He also has bronchodilator therapy with nebulizer and albuterol  He uses it twice a day  He appears to be stable at this time  I personally reviewed images of CT of chest with patient  Severe centrilobular emphysema was noted

## 2019-10-14 NOTE — ASSESSMENT & PLAN NOTE
Patient has chronic respiratory failure with hypoxia  Room air oxygen at rest is 91% and with ambulation went to 80 7%  With 2 L of oxygen at rest is 97% with 2 L ambulation 93%  Patient continues to use an benefit with portable concentrator  He is oxygen dependent

## 2019-10-15 ENCOUNTER — TELEPHONE (OUTPATIENT)
Dept: GASTROENTEROLOGY | Facility: CLINIC | Age: 74
End: 2019-10-15

## 2019-10-15 ENCOUNTER — CLINICAL SUPPORT (OUTPATIENT)
Dept: PULMONOLOGY | Facility: CLINIC | Age: 74
End: 2019-10-15
Payer: MEDICARE

## 2019-10-15 DIAGNOSIS — J43.2 CENTRILOBULAR EMPHYSEMA (HCC): ICD-10-CM

## 2019-10-15 PROCEDURE — G0424 PULMONARY REHAB W EXER: HCPCS

## 2019-10-15 NOTE — TELEPHONE ENCOUNTER
Called and spoke to the pharmacist they said they cant be crushed or split but there are packets that he can be prescribed there's only 5 gram packets available

## 2019-10-15 NOTE — TELEPHONE ENCOUNTER
Patient stated he did not do well with questran packets in the past  If he is willing we can try the colestipol powder instead to see if it helps him  He should not be cutting the colestipol tablets so if there are too big we need to find a different regiment   The other option is waiting until his colonoscopy to see if there is any explanation for his diarrhea prior to trying a different symptom control medication

## 2019-10-15 NOTE — TELEPHONE ENCOUNTER
Could you possibly call the pharmacy on file and ask them if it can be crushed   If so, he can crush the medication and take it in applesauce or pudding

## 2019-10-15 NOTE — TELEPHONE ENCOUNTER
Patient came into the office stating the colestipol medication is too big for him to swallow he's been cutting it in half and yet the half is still too big for him to get down    Please advise thank you!

## 2019-10-16 ENCOUNTER — APPOINTMENT (OUTPATIENT)
Dept: WOUND CARE | Facility: HOSPITAL | Age: 74
End: 2019-10-16
Payer: MEDICARE

## 2019-10-16 PROCEDURE — 97597 DBRDMT OPN WND 1ST 20 CM/<: CPT

## 2019-10-17 ENCOUNTER — CLINICAL SUPPORT (OUTPATIENT)
Dept: PULMONOLOGY | Facility: CLINIC | Age: 74
End: 2019-10-17
Payer: MEDICARE

## 2019-10-17 DIAGNOSIS — J43.2 CENTRILOBULAR EMPHYSEMA (HCC): ICD-10-CM

## 2019-10-17 PROCEDURE — G0424 PULMONARY REHAB W EXER: HCPCS

## 2019-10-22 ENCOUNTER — HOSPITAL ENCOUNTER (OUTPATIENT)
Dept: RADIOLOGY | Facility: HOSPITAL | Age: 74
Discharge: HOME | End: 2019-10-22
Payer: MEDICARE

## 2019-10-22 ENCOUNTER — TELEPHONE (OUTPATIENT)
Dept: GASTROENTEROLOGY | Facility: CLINIC | Age: 74
End: 2019-10-22

## 2019-10-22 ENCOUNTER — APPOINTMENT (OUTPATIENT)
Dept: PULMONOLOGY | Facility: CLINIC | Age: 74
End: 2019-10-22
Payer: MEDICARE

## 2019-10-22 DIAGNOSIS — R14.0 ABDOMINAL BLOATING: ICD-10-CM

## 2019-10-22 DIAGNOSIS — R68.81 EARLY SATIETY: ICD-10-CM

## 2019-10-22 PROCEDURE — 78264 GASTRIC EMPTYING IMG STUDY: CPT

## 2019-10-22 PROCEDURE — A9541 TC99M SULFUR COLLOID: HCPCS

## 2019-10-22 NOTE — PROGRESS NOTES
Pulmonary Rehabilitation Plan of Care   90 day       Today's date: 10/22/2019   Visits: 22  Patient name: Marielos Marshall      : 1945  Age: 75 y o  MRN: [id number]  Referring Physician: Danae Vaca PA-C  Provider: Petar Laughlin  Clinician: Alonso Morales RN    Dx:   Encounter Diagnosis   Name Primary?  Centrilobular emphysema ([address] )      Date of onset: 3/29/2018      SUMMARY OF PROGRESS:  Mr Moni Storm completed 22 sessions of the pulmonary rehabilitation program  He completes 55 minutes of cardiovascular exercise with a light weight strength training component  His RPE 3-6 RPD 3-7  Resting oxygenation rate was 95-98% on 3L/min of Oxygen via nasal canula and during peak exercise 94-98%  Short of breath at rest and with activity  Exercise MET level increased from 1 7 to 2 3 MET's  PHQ9 score improved from 11 to 9 score  Will repeat PHQ9  He stated he is not interested in any intervention for depression and unwilling to make any dietary changes at this time  He has a attended weekly educational classes and had a face to face with pulmonologist   Will continue to monitor  Medication compliance: Yes   Comments: understands medicatiosn  Fall Risk: Moderate   Comments: stated he often gets dizzy and feels unbalanced    EKG changes: NSR at rest and with exercise      EXERCISE ASSESSMENT and PLAN    Current Exercise Program in Rehab:       Frequency: 2 days/week        Minutes: 41-55         METS: 2 3            HR:    RPE: 3-6               RPD: 3-8        Modalities: Treadmill, UBE, NuStep and Recumbent bike      Exercise Progression 30 Day Goals :    Frequency: 2 days/week   Minutes: 40-45   METS: 2 3-3 5   HR: 107-137    RPE: 4-6   Modalities: Treadmill, UBE, NuStep and Recumbent bike    Strength trainin-3 days / week  12-15 repetitions  1-2 sets per modality    Modalities: Lateral Raise, Arm Curl, Sit to Stands, Upright Rows, Front Raises and Shoulder Shrugs    Progressing:   In Progress    Home Exercise: None    Goals: increase walking distance to walk to get newspaper at home, Increase endurance to play pool and shuffle board  Education: home exercise guidelines, signs and sxs and RPE scale, exercise and the pulmonary patient  Plan:none at this time  Readiness to change: Preparation      NUTRITION ASSESSMENT AND PLAN    Weight control:    Starting weight: 195   Current weight:     Waist circumference:    Startin 5   Current:    Diabetes: none  Lipid management: discussed diet, no hyperlipidemia at this time  Goals:patient not interested in making any dietary changes at this time  Education: low sodium diet  Progressing:No  Plan: Education class: Reading Food Labels  Readiness to change: Pre-Contemplation      PSYCHOSOCIAL ASSESSMENT AND PLAN    Emotional: 11          10-14 = Moderate Depression  Self-reported stress level: 5   Social support: Good   Goals:  PHQ-9 - reduced severity by one level  Education: signs/sxs of depression, benefits of positive support system and coping mechanisms, decreasing stress and relaxation techniques  Progressing:No  Plan: PHQ-9 >5 will refer to MD, Practice relaxation techniques and pt is not interested in intervention for depression at this time  Readiness to change: Pre-Contemplation      OTHER CORE COMPONENTS     Tobacco:   Social History     Tobacco Use   Smoking Status Former Smoker    Packs/day: 1 00    Years: 60 00    Pack years: 60 00    Last attempt to quit: 2017    Years since quittin 1   Smokeless Tobacco Never Used   Tobacco Comment    quit in 2017       Tobacco Use Intervention: Referral to tobacco expert:   N/A    Blood pressure:    Restin/82   Exercise: 144/84    Goals: consistent BP < 130/80  Education:  low sodium diet and HTN, medications and bronchial hygiene  Progressing: In Progress  Plan: take medications as prescribed  Readiness to change: Preparation

## 2019-10-22 NOTE — TELEPHONE ENCOUNTER
----- Message from Lora Rogers PA-C sent at 10/22/2019  1:01 PM EDT -----  Please inform patient that his gastric emptying study shows a normal rate of emptying which is good news   We will further assess at his upcoming procedure

## 2019-10-23 ENCOUNTER — APPOINTMENT (OUTPATIENT)
Dept: WOUND CARE | Facility: HOSPITAL | Age: 74
End: 2019-10-23
Payer: MEDICARE

## 2019-10-23 PROCEDURE — 97597 DBRDMT OPN WND 1ST 20 CM/<: CPT

## 2019-10-24 ENCOUNTER — CLINICAL SUPPORT (OUTPATIENT)
Dept: PULMONOLOGY | Facility: CLINIC | Age: 74
End: 2019-10-24
Payer: MEDICARE

## 2019-10-24 VITALS — HEIGHT: 77 IN | BODY MASS INDEX: 23.02 KG/M2 | WEIGHT: 195 LBS

## 2019-10-24 DIAGNOSIS — J43.2 CENTRILOBULAR EMPHYSEMA (HCC): ICD-10-CM

## 2019-10-24 PROCEDURE — G0424 PULMONARY REHAB W EXER: HCPCS

## 2019-10-24 NOTE — PROGRESS NOTES
Medical Director 30 day Pulmonary Rehabilitation Review    I certify that I have met with Lucie Coyne face-to face to provide a 30 day progress review of his/her pulmonary rehabilitation program at [address] [place] [address]  I have reviewed the most recent individualized treatment plan (ITP), outcomes assessment, and provided opportunity for discussion with the patient    Continue with current treatment plan yes    Please provide the following modifications to the current treatment plan:  Increase time and resistance as tolerated

## 2019-10-29 ENCOUNTER — CLINICAL SUPPORT (OUTPATIENT)
Dept: PULMONOLOGY | Facility: CLINIC | Age: 74
End: 2019-10-29
Payer: MEDICARE

## 2019-10-29 ENCOUNTER — OFFICE VISIT (OUTPATIENT)
Dept: FAMILY MEDICINE CLINIC | Facility: CLINIC | Age: 74
End: 2019-10-29
Payer: MEDICARE

## 2019-10-29 VITALS
DIASTOLIC BLOOD PRESSURE: 68 MMHG | OXYGEN SATURATION: 95 % | SYSTOLIC BLOOD PRESSURE: 124 MMHG | BODY MASS INDEX: 23.18 KG/M2 | WEIGHT: 195.44 LBS | HEART RATE: 98 BPM | TEMPERATURE: 98.1 F | RESPIRATION RATE: 20 BRPM

## 2019-10-29 DIAGNOSIS — J43.2 CENTRILOBULAR EMPHYSEMA (HCC): ICD-10-CM

## 2019-10-29 DIAGNOSIS — I27.20 PULMONARY HYPERTENSION (HCC): ICD-10-CM

## 2019-10-29 DIAGNOSIS — I87.2 CHRONIC VENOUS INSUFFICIENCY: ICD-10-CM

## 2019-10-29 DIAGNOSIS — I10 ESSENTIAL HYPERTENSION: ICD-10-CM

## 2019-10-29 DIAGNOSIS — R19.7 DIARRHEA, UNSPECIFIED TYPE: Primary | ICD-10-CM

## 2019-10-29 DIAGNOSIS — E88.01 AAT (ALPHA-1-ANTITRYPSIN) DEFICIENCY (HCC): ICD-10-CM

## 2019-10-29 PROCEDURE — G0424 PULMONARY REHAB W EXER: HCPCS

## 2019-10-29 PROCEDURE — 99214 OFFICE O/P EST MOD 30 MIN: CPT | Performed by: FAMILY MEDICINE

## 2019-10-29 NOTE — PROGRESS NOTES
Pulmonary Rehabilitation Plan of Care   Discharge      Today's date: 10/29/2019   Visits: 24  Patient name: Burton Colorado      : 1945  Age: 75 y o  MRN: [id number]  Referring Physician: Pete Costa PA-C  Provider: Jorge Reyna  Clinician: Martin Armenta RN    Dx:   Encounter Diagnosis   Name Primary?  Centrilobular emphysema ([address] )      Date of onset: 3/29/2018      SUMMARY OF PROGRESS:  Mr Birdie Piedra completed the 24 sessions of the pulmonary rehabilitation program  He completes 55 minutes of cardiovascular exercise with a light weight strength training component  His RPE 3-6 RPD 3-7  Resting oxygenation rate was 95-98% on 3L/min of Oxygen via nasal canula and during peak exercise 94-98%  Short of breath at rest and with activity  Exercise MET level increased from 1 7 to 2 3 MET's  PHQ9 score increased from 11 to 12 score  He stated he is not interested in any intervention for depression and unwilling to make any dietary changes at this time  He has a attended weekly educational classes and had a face to face with pulmonologist  His endurance improved 50 0% increased walking distance from 510 to 765 feet in 6 minutes  USCD score decreased from 64 to 75  CAT score decreased from 24 to 27  Going to continue to be active at clubhouse twice a week for 30 minutes   He will continue to follow up with his his pulmonologist          Medication compliance: Yes   Comments: understands medicatiosn  Fall Risk: Moderate   Comments: stated he often gets dizzy and feels unbalanced    EKG changes: NSR at rest and with exercise      EXERCISE ASSESSMENT and PLAN    Current Exercise Program in Rehab:       Frequency: 2 days/week        Minutes: 41-55         METS: 2 3            HR:    RPE: 3-6               RPD: 3-8        Modalities: Treadmill, UBE, NuStep and Recumbent bike      Strength trainin-3 days / week  12-15 repetitions  1-2 sets per modality    Modalities: Lateral Raise, Arm Curl, Sit to [employer], Upright Rows, Front Raises and Shoulder Shrugs    Progressing: In Progress    Home Exercise: None    Goals: increase walking distance to walk to get newspaper at home, Increase endurance to play pool and shuffle board (met goal)  Education: home exercise guidelines, signs and sxs and RPE scale, exercise and the pulmonary patient  Plan:none at this time  Readiness to change: Preparation      NUTRITION ASSESSMENT AND PLAN    Weight control:    Starting weight: 195   Current weight:     Waist circumference:    Startin 5   Current:    Diabetes: none  Lipid management: discussed diet, no hyperlipidemia at this time  Goals:patient not interested in making any dietary changes at this time  Education: low sodium diet  Progressing:No  Plan: Education class: Reading Food Labels  Readiness to change: Pre-Contemplation      PSYCHOSOCIAL ASSESSMENT AND PLAN    Emotional: 11          10-14 = Moderate Depression  Self-reported stress level: 5   Social support: Good   Goals:  PHQ-9 - reduced severity by one level  Education: signs/sxs of depression, benefits of positive support system and coping mechanisms, decreasing stress and relaxation techniques  Progressing:No  Plan: PHQ-9 >5 will refer to MD, Practice relaxation techniques and pt is not interested in intervention for depression at this time  Readiness to change: Pre-Contemplation      OTHER CORE COMPONENTS     Tobacco:   Social History     Tobacco Use   Smoking Status Former Smoker    Packs/day: 1 00    Years: 60 00    Pack years: 60 00    Last attempt to quit: 2017    Years since quittin 1   Smokeless Tobacco Never Used   Tobacco Comment    quit in 2017       Tobacco Use Intervention: Referral to tobacco expert:   N/A    Blood pressure:    Restin/72   Exercise: 138/76    Goals: consistent BP < 130/80  Education:  low sodium diet and HTN, medications and bronchial hygiene  Progressing: In Progress  Plan: take medications as prescribed  Readiness to change: Preparation

## 2019-10-29 NOTE — PROGRESS NOTES
Camilo Ferreira   1945     Risk: high     Pre Post % Change Goal   Date: 8/1/2019 10/29/2019     Physical       CAT 24 27 12 5%    6MWT (feet) 510 765 50 0% 10% increase   [facility] Dyspnea Score 64 75 17 2%    Arm Curl 12 13     Chair to Stand 4 5     Peak exercise CR/WI (mets) 1 7 2 3 35 3% 40% increase   Emotional       PHQ9 (> 10 refer to MD) 11 12 9 1% 4 pt decrease   [place] (lower score = improvement)       Total 26 31 19 2% < 27   Feelings 1 3 200 0% < 3   Physical Fitness 4 4 0 0% < 3   Social Support 4 4 0 0% < 3   Daily Activities 4 4 0 0% < 3   Social Activities 4 4 0 0% < 3   Pain 4 4 -50 0% < 3   Overall Health 4 2 25 0% < 3   Quality of Life 3 5 0 0% < 3   Change in Health 3 3 -33 3% < 3   Dietary  2     Rate your plate 38 39 4 8% > 58   Measurements       Weight 195 195 0 0% 2 5 - 5%   BMI 23 1 23 1 0 0% 19 - 25   Waist Circ  41 5 41 -1 2% < 40 M / < 35 F   % Body fat 32 6 32 1 -1 5% < 25 M / < 33 F   BP left arm               (systolic) 309 963 -2 1% < 484   (diastolic) 74 82 27 1% < 90   Smoking #/day  (if applicable) 0 0 2 7% 0   PFT/Glucose        FVC 68%      FEV1 40%      FEV1/FVC 59%      Supplemental O2 3L      A1C 5 4   4 0 - 5 6%   Fasting

## 2019-10-29 NOTE — PROGRESS NOTES
Assessment/Plan:    No problem-specific Assessment & Plan notes found for this encounter  Diagnoses and all orders for this visit:    Diarrhea, unspecified type  Comments:  Possibly due to alpha-1 antitrypsin deficiency but will refer to GI  Pulmonary hypertension (HCC)    Chronic venous insufficiency  Comments:  Left lower extremity slightly swollen  Will obtain vascular studies  Essential hypertension    Centrilobular emphysema (HCC)  Comments:  No changes in his regimen follow up with pulmonology    AAT (alpha-1-antitrypsin) deficiency (HCC)          Subjective:      Patient ID: Magdaleno Cortes is a 75 y o  male  [facility] back surgery 2004 - fusion  Patient presents for follow-up of medical conditions including COPD, alpha-1 antitrypsin deficiency, GERD and hypertension  He has been following Gastroenterology for early satiety  In addition he confesses to loose stools multiple times a day though this has not changed in consistency recently  He denies melena or blood per rectum  He denies hematuria  He has no vomiting and rare nausea  Denies abdominal pain  He has some left lower extremity pain worse over the last day  His breathing is at its baseline shortness of breath  Denies chest pain or palpitations  Has no fever or sweats  Has a mild chronic cough but no other you upper respiratory complaints  The following portions of the patient's history were reviewed and updated as appropriate: allergies, current medications, past family history, past medical history, past social history, past surgical history and problem list     Review of Systems  no change in urinary pattern  No change in vision, speech, hearing or swallowing  Rest of review of systems per [facility]      Objective:      /68 (BP Location: Left arm, Patient Position: Sitting)   Pulse 98   Temp 98 1 °F (36 7 °C) (Tympanic)   Resp 20   Wt 88 6 kg (195 lb 7 oz)   SpO2 95% Comment: on 3LO2 by nasal cannula  BMI 23 18 kg/m² Physical Exam  general cooperative in no acute distress  HEENT TMs clear  Nose and mouth moist mucous membranes without erythema or exudate  Neck is supple without LAD  Lungs with slightly diminished breath sounds at the bases and minimal expiratory wheezing  Heart regular without S3 or 4  Abdomen BS positive, soft, nondistended and nontender without rebound or guarding  Left lower extremity is slightly more swollen than the right no palpable cords  No skin breakdown  Capillary refill about 3 seconds  Pedal pulses are palpable

## 2019-10-30 ENCOUNTER — APPOINTMENT (OUTPATIENT)
Dept: WOUND CARE | Facility: HOSPITAL | Age: 74
End: 2019-10-30
Payer: MEDICARE

## 2019-10-30 ENCOUNTER — HOSPITAL ENCOUNTER (OUTPATIENT)
Dept: RADIOLOGY | Facility: HOSPITAL | Age: 74
Discharge: HOME | End: 2019-10-30
Attending: FAMILY MEDICINE
Payer: MEDICARE

## 2019-10-30 DIAGNOSIS — M79.605 LEG PAIN, DIFFUSE, LEFT: ICD-10-CM

## 2019-10-30 DIAGNOSIS — M79.605 LEG PAIN, DIFFUSE, LEFT: Primary | ICD-10-CM

## 2019-10-30 PROCEDURE — 93971 EXTREMITY STUDY: CPT

## 2019-10-30 PROCEDURE — 93971 EXTREMITY STUDY: CPT | Performed by: SURGERY

## 2019-10-30 PROCEDURE — 97597 DBRDMT OPN WND 1ST 20 CM/<: CPT

## 2019-10-30 RX ORDER — CEPHALEXIN 500 MG/1
500 CAPSULE ORAL EVERY 12 HOURS SCHEDULED
Qty: 20 CAPSULE | Refills: 0 | Status: SHIPPED | OUTPATIENT
Start: 2019-10-30 | End: 2019-11-09

## 2019-11-06 ENCOUNTER — APPOINTMENT (OUTPATIENT)
Dept: WOUND CARE | Facility: HOSPITAL | Age: 74
End: 2019-11-06
Payer: MEDICARE

## 2019-11-06 PROCEDURE — 97597 DBRDMT OPN WND 1ST 20 CM/<: CPT

## 2019-11-08 ENCOUNTER — OFFICE VISIT (OUTPATIENT)
Dept: VASCULAR SURGERY | Facility: CLINIC | Age: 74
End: 2019-11-08
Payer: MEDICARE

## 2019-11-08 VITALS
HEART RATE: 88 BPM | RESPIRATION RATE: 20 BRPM | SYSTOLIC BLOOD PRESSURE: 144 MMHG | WEIGHT: 194 LBS | BODY MASS INDEX: 22.91 KG/M2 | TEMPERATURE: 98.2 F | DIASTOLIC BLOOD PRESSURE: 74 MMHG | HEIGHT: 77 IN

## 2019-11-08 DIAGNOSIS — I83.029 VENOUS ULCER OF LEFT LOWER EXTREMITY WITH VARICOSE VEINS (HCC): ICD-10-CM

## 2019-11-08 DIAGNOSIS — I87.2 CHRONIC VENOUS INSUFFICIENCY: Primary | ICD-10-CM

## 2019-11-08 DIAGNOSIS — L97.929 VENOUS ULCER OF LEFT LOWER EXTREMITY WITH VARICOSE VEINS (HCC): ICD-10-CM

## 2019-11-08 PROCEDURE — 99213 OFFICE O/P EST LOW 20 MIN: CPT | Performed by: SURGERY

## 2019-11-08 NOTE — LETTER
November 8, 2019     Rl Henning MD  [facility] [address]    Patient: Olivia Urbina   YOB: 1945   Date of Visit: 11/8/2019       Dear Dr Anay Ward:    Thank you for referring Thom Ramirez to me for evaluation  Below are the relevant portions of my assessment and plan of care  Mr Mable Napier presents with c/o left ankle wound for the past several months  Initially the wound began healing,  However, it has stalled over the last few months  Patient did have a wound at the lateral malleolar region, which has now healed  Patient recently had a lower extremity venous duplex on 10/30/2019 which showed no evidence of DVT  His last reflux study was performed on 06/28/2019 which showed an absent great saphenous vein, however, there is a large anterior accessory vein which is incompetent  Ablation of this accessory vein will likely  Expedite wound healing and decrease recurrence  However prior to proceeding I would like to again check the medial malleolar region for the presence of incompetent perforators  A venous valvular insufficiency study will thus be repeated  Of note, the patient has a palpable dorsalis pedis pulse  The patient will return to the office in 2-3 weeks to review this test  Wound is open with no drainage seen  Pt is not taking any blood thinning medications or statins  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 (HCC)  Previous venous valvular insufficiency study showed no evidence of a great saphenous vein however, there is presence of an incompetent accessory vein measuring up to 5 mm in diameter  I believe ablation of this vein will expedite wound healing and decrease recurrence  However prior to proceeding I would like to once again check for the presence of incompetent perforators at the medial malleolar region    Patient will return in 2-3 weeks to review this test   I have also prescribed 20-30 millimeter mercury knee-high compression stockings and have instructed the patient on the importance of their use  Patient is currently wearing Tubigrip compression  Patient has a palpable dorsalis pedis pulse  If you have questions, please do not hesitate to call me  I look forward to following Mikayla Davison along with you           Sincerely,        Gus Rios MD        CC: MD Tim Abraham[address]

## 2019-11-08 NOTE — PROGRESS NOTES
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 (HCC)  Previous venous valvular insufficiency study showed no evidence of a great saphenous vein however, there is presence of an incompetent accessory vein measuring up to 5 mm in diameter  I believe ablation of this vein will expedite wound healing and decrease recurrence  However prior to proceeding I would like to once again check for the presence of incompetent perforators at the medial malleolar region  Patient will return in 2-3 weeks to review this test   I have also prescribed 20-30 millimeter mercury knee-high compression stockings and have instructed the patient on the importance of their use  Patient is currently wearing Tubigrip compression  Patient has a palpable dorsalis pedis pulse  Chronic venous insufficiency  Previous venous valvular insufficiency study showed no evidence of a great saphenous vein however, there is presence of an incompetent accessory vein measuring up to 5 mm in diameter  I believe ablation of this vein will expedite wound healing and decrease recurrence  However prior to proceeding I would like to once again check for the presence of incompetent perforators at the medial malleolar region  Patient will return in 2-3 weeks to review this test   I have also prescribed 20-30 millimeter mercury knee-high compression stockings and have instructed the patient on the importance of their use  Patient is currently wearing Tubigrip compression  Patient has a palpable dorsalis pedis pulse         Diagnoses and all orders for this visit:    Chronic venous insufficiency  -     VAS reflux lower limb venous duplex study with reflux assesment, unilateral; Future  -     Compression Stocking    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 (HCC)  -     VAS reflux lower limb venous duplex study with reflux assesment, unilateral; Future  -     Compression Stocking          Subjective:      Patient ID: Olivia Urbina is a 75 y o  male  Pt is here to review the results of his LEV on 10/30/19 and for a check of his left ankle wound  Pt c/o left ankle wound for the past several months  Initially the wound began healing  However it is stalled over the last few months  Patient did have a wound at the lateral aspect which has now healed  Patient recently had a lower extremity venous duplex on 10/30/2019 which showed no evidence of DVT  His last reflux study was performed on 06/28/2019 which showed an absent great saphenous vein, however, there is a large anterior accessory vein which is incompetent  Ablation of this accessory vein will likely  Expedite wound healing and decrease recurrence  However prior to proceeding I would like to again check the medial malleolar region for the presence of incompetent perforators  A venous valvular insufficiency study will thus be repeated  Of note, the patient has a palpable dorsalis pedis pulse  The patient will return to the office in 2-3 weeks to review this test  Wound is open with no drainage seen  Pt is not taking any blood thinning medications or statins  :   The following portions of the patient's history were reviewed and updated as appropriate: allergies, current medications, past family history, past medical history, past social history, past surgical history and problem list     Review of Systems   Constitutional: Negative  HENT: Negative  Eyes: Negative  Respiratory: Positive for shortness of breath  Cardiovascular: Negative  Gastrointestinal: Positive for abdominal pain and nausea  Endocrine: Negative  Genitourinary: Positive for frequency  Musculoskeletal: Positive for back pain  Skin: Positive for wound (Left Ankle)  Allergic/Immunologic: Negative  Neurological: Negative  Hematological: Negative  Psychiatric/Behavioral: Negative            Objective:      /74 (BP Location: Right arm, Patient Position: Sitting, Cuff Size: Adult)   Pulse 88   Temp 98 2 °F (36 8 °C) (Tympanic)   Resp 20   Ht 6' 5" (1 956 m)   Wt 88 kg (194 lb)   BMI 23 01 kg/m²          Physical Exam   Constitutional: He is oriented to person, place, and time  He appears well-developed and well-nourished  HENT:   Head: Normocephalic and atraumatic  Mouth/Throat: Oropharynx is clear and moist    Eyes: Pupils are equal, round, and reactive to light  Conjunctivae and EOM are normal    Neck: Normal range of motion  Neck supple  No JVD present  Cardiovascular: Normal rate, regular rhythm and normal heart sounds  Pulmonary/Chest: Effort normal and breath sounds normal  No stridor  No respiratory distress  He has no wheezes  He has no rales  He exhibits no tenderness  Abdominal: Soft  He exhibits no distension and no mass  There is no tenderness  There is no rebound and no guarding  Musculoskeletal: Normal range of motion  He exhibits no tenderness or deformity  Neurological: He is alert and oriented to person, place, and time  Skin: Skin is warm and dry  No rash noted  No erythema  Left medial malleolar wound approximately 5 mm in diameter  Patient has significant lipodermatosclerosis  Represent C6 disease   Psychiatric: He has a normal mood and affect  His behavior is normal  Thought content normal    Nursing note and vitals reviewed

## 2019-11-08 NOTE — ASSESSMENT & PLAN NOTE
Previous venous valvular insufficiency study showed no evidence of a great saphenous vein however, there is presence of an incompetent accessory vein measuring up to 5 mm in diameter  I believe ablation of this vein will expedite wound healing and decrease recurrence  However prior to proceeding I would like to once again check for the presence of incompetent perforators at the medial malleolar region  Patient will return in 2-3 weeks to review this test   I have also prescribed 20-30 millimeter mercury knee-high compression stockings and have instructed the patient on the importance of their use  Patient is currently wearing Tubigrip compression  Patient has a palpable dorsalis pedis pulse

## 2019-11-10 ENCOUNTER — HOSPITAL ENCOUNTER (INPATIENT)
Facility: HOSPITAL | Age: 74
LOS: 4 days | Discharge: HOME | DRG: 189 | End: 2019-11-15
Attending: EMERGENCY MEDICINE | Admitting: FAMILY MEDICINE
Payer: MEDICARE

## 2019-11-10 ENCOUNTER — APPOINTMENT (EMERGENCY)
Dept: RADIOLOGY | Facility: HOSPITAL | Age: 74
DRG: 189 | End: 2019-11-10
Attending: EMERGENCY MEDICINE
Payer: MEDICARE

## 2019-11-10 DIAGNOSIS — E88.01 AAT (ALPHA-1-ANTITRYPSIN) DEFICIENCY (HCC): ICD-10-CM

## 2019-11-10 DIAGNOSIS — R10.32 ACUTE LEFT LOWER QUADRANT PAIN: ICD-10-CM

## 2019-11-10 DIAGNOSIS — J44.1 COPD EXACERBATION (HCC): ICD-10-CM

## 2019-11-10 DIAGNOSIS — L97.929 VENOUS ULCER OF LEFT LOWER EXTREMITY WITH VARICOSE VEINS (HCC): ICD-10-CM

## 2019-11-10 DIAGNOSIS — J96.21 ACUTE ON CHRONIC RESPIRATORY FAILURE WITH HYPOXIA (HCC): ICD-10-CM

## 2019-11-10 DIAGNOSIS — I83.029 VENOUS ULCER OF LEFT LOWER EXTREMITY WITH VARICOSE VEINS (HCC): ICD-10-CM

## 2019-11-10 DIAGNOSIS — I21.4 NSTEMI (NON-ST ELEVATED MYOCARDIAL INFARCTION) (HCC): Primary | ICD-10-CM

## 2019-11-10 DIAGNOSIS — J43.2 CENTRILOBULAR EMPHYSEMA (HCC): ICD-10-CM

## 2019-11-10 LAB
ANION GAP SERPL CALCULATED.3IONS-SCNC: 10 MMOL/L (ref 4–13)
APTT PPP: 26 SECONDS (ref 23–37)
BASE EX.OXY STD BLDV CALC-SCNC: 87.5 % (ref 60–80)
BASE EXCESS BLDV CALC-SCNC: -1.8 MMOL/L
BASOPHILS # BLD AUTO: 0.09 THOUSANDS/ΜL (ref 0–0.1)
BASOPHILS NFR BLD AUTO: 1 % (ref 0–1)
BUN SERPL-MCNC: 12 MG/DL (ref 5–25)
CALCIUM SERPL-MCNC: 9.5 MG/DL (ref 8.3–10.1)
CHLORIDE SERPL-SCNC: 104 MMOL/L (ref 100–108)
CO2 SERPL-SCNC: 27 MMOL/L (ref 21–32)
CREAT SERPL-MCNC: 1.29 MG/DL (ref 0.6–1.3)
EOSINOPHIL # BLD AUTO: 0.3 THOUSAND/ΜL (ref 0–0.61)
EOSINOPHIL NFR BLD AUTO: 4 % (ref 0–6)
ERYTHROCYTE [DISTWIDTH] IN BLOOD BY AUTOMATED COUNT: 12.6 % (ref 11.6–15.1)
GFR SERPL CREATININE-BSD FRML MDRD: 54 ML/MIN/1.73SQ M
GLUCOSE SERPL-MCNC: 115 MG/DL (ref 65–140)
HCO3 BLDV-SCNC: 22.4 MMOL/L (ref 24–30)
HCT VFR BLD AUTO: 45.9 % (ref 36.5–49.3)
HGB BLD-MCNC: 15.6 G/DL (ref 12–17)
IMM GRANULOCYTES # BLD AUTO: 0.05 THOUSAND/UL (ref 0–0.2)
IMM GRANULOCYTES NFR BLD AUTO: 1 % (ref 0–2)
INR PPP: 0.99 (ref 0.91–1.09)
LYMPHOCYTES # BLD AUTO: 2.54 THOUSANDS/ΜL (ref 0.6–4.47)
LYMPHOCYTES NFR BLD AUTO: 30 % (ref 14–44)
MCH RBC QN AUTO: 32.3 PG (ref 26.8–34.3)
MCHC RBC AUTO-ENTMCNC: 34 G/DL (ref 31.4–37.4)
MCV RBC AUTO: 95 FL (ref 82–98)
MONOCYTES # BLD AUTO: 0.57 THOUSAND/ΜL (ref 0.17–1.22)
MONOCYTES NFR BLD AUTO: 7 % (ref 4–12)
NEUTROPHILS # BLD AUTO: 4.89 THOUSANDS/ΜL (ref 1.85–7.62)
NEUTS SEG NFR BLD AUTO: 57 % (ref 43–75)
NRBC BLD AUTO-RTO: 0 /100 WBCS
NT-PROBNP SERPL-MCNC: 170 PG/ML
O2 CT BLDV-SCNC: 19.8 ML/DL
PCO2 BLDV: 36.9 MM HG (ref 42–50)
PH BLDV: 7.4 [PH] (ref 7.3–7.4)
PLATELET # BLD AUTO: 206 THOUSANDS/UL (ref 149–390)
PMV BLD AUTO: 9 FL (ref 8.9–12.7)
PO2 BLDV: 54.2 MM HG (ref 35–45)
POTASSIUM SERPL-SCNC: 3.9 MMOL/L (ref 3.5–5.3)
PROTHROMBIN TIME: 10.7 SECONDS (ref 9.8–12)
RBC # BLD AUTO: 4.83 MILLION/UL (ref 3.88–5.62)
SODIUM SERPL-SCNC: 141 MMOL/L (ref 136–145)
TROPONIN I SERPL-MCNC: 0.13 NG/ML
WBC # BLD AUTO: 8.44 THOUSAND/UL (ref 4.31–10.16)

## 2019-11-10 PROCEDURE — 94760 N-INVAS EAR/PLS OXIMETRY 1: CPT

## 2019-11-10 PROCEDURE — 82805 BLOOD GASES W/O2 SATURATION: CPT | Performed by: EMERGENCY MEDICINE

## 2019-11-10 PROCEDURE — 85610 PROTHROMBIN TIME: CPT | Performed by: EMERGENCY MEDICINE

## 2019-11-10 PROCEDURE — 99285 EMERGENCY DEPT VISIT HI MDM: CPT

## 2019-11-10 PROCEDURE — 96375 TX/PRO/DX INJ NEW DRUG ADDON: CPT

## 2019-11-10 PROCEDURE — 94660 CPAP INITIATION&MGMT: CPT

## 2019-11-10 PROCEDURE — 80048 BASIC METABOLIC PNL TOTAL CA: CPT | Performed by: EMERGENCY MEDICINE

## 2019-11-10 PROCEDURE — 71045 X-RAY EXAM CHEST 1 VIEW: CPT

## 2019-11-10 PROCEDURE — 83880 ASSAY OF NATRIURETIC PEPTIDE: CPT | Performed by: EMERGENCY MEDICINE

## 2019-11-10 PROCEDURE — 84484 ASSAY OF TROPONIN QUANT: CPT | Performed by: STUDENT IN AN ORGANIZED HEALTH CARE EDUCATION/TRAINING PROGRAM

## 2019-11-10 PROCEDURE — 85025 COMPLETE CBC W/AUTO DIFF WBC: CPT | Performed by: EMERGENCY MEDICINE

## 2019-11-10 PROCEDURE — 84484 ASSAY OF TROPONIN QUANT: CPT | Performed by: EMERGENCY MEDICINE

## 2019-11-10 PROCEDURE — 85730 THROMBOPLASTIN TIME PARTIAL: CPT | Performed by: EMERGENCY MEDICINE

## 2019-11-10 PROCEDURE — 93005 ELECTROCARDIOGRAM TRACING: CPT

## 2019-11-10 PROCEDURE — 96365 THER/PROPH/DIAG IV INF INIT: CPT

## 2019-11-10 PROCEDURE — 94640 AIRWAY INHALATION TREATMENT: CPT

## 2019-11-10 PROCEDURE — 36415 COLL VENOUS BLD VENIPUNCTURE: CPT | Performed by: EMERGENCY MEDICINE

## 2019-11-10 RX ORDER — METHYLPREDNISOLONE SODIUM SUCCINATE 125 MG/2ML
INJECTION, POWDER, LYOPHILIZED, FOR SOLUTION INTRAMUSCULAR; INTRAVENOUS
Status: COMPLETED
Start: 2019-11-10 | End: 2019-11-10

## 2019-11-10 RX ORDER — IPRATROPIUM BROMIDE AND ALBUTEROL SULFATE 2.5; .5 MG/3ML; MG/3ML
3 SOLUTION RESPIRATORY (INHALATION) ONCE
Status: COMPLETED | OUTPATIENT
Start: 2019-11-10 | End: 2019-11-10

## 2019-11-10 RX ORDER — ASPIRIN 325 MG
325 TABLET ORAL ONCE
Status: COMPLETED | OUTPATIENT
Start: 2019-11-10 | End: 2019-11-10

## 2019-11-10 RX ORDER — MAGNESIUM SULFATE HEPTAHYDRATE 40 MG/ML
2 INJECTION, SOLUTION INTRAVENOUS ONCE
Status: COMPLETED | OUTPATIENT
Start: 2019-11-10 | End: 2019-11-10

## 2019-11-10 RX ORDER — ACETYLCYSTEINE 200 MG/ML
3 SOLUTION ORAL; RESPIRATORY (INHALATION)
Status: DISCONTINUED | OUTPATIENT
Start: 2019-11-11 | End: 2019-11-11

## 2019-11-10 RX ORDER — METHYLPREDNISOLONE SODIUM SUCCINATE 125 MG/2ML
125 INJECTION, POWDER, LYOPHILIZED, FOR SOLUTION INTRAMUSCULAR; INTRAVENOUS ONCE
Status: COMPLETED | OUTPATIENT
Start: 2019-11-10 | End: 2019-11-10

## 2019-11-10 RX ORDER — ALBUTEROL SULFATE 2.5 MG/3ML
5 SOLUTION RESPIRATORY (INHALATION) ONCE
Status: COMPLETED | OUTPATIENT
Start: 2019-11-10 | End: 2019-11-10

## 2019-11-10 RX ORDER — IPRATROPIUM BROMIDE AND ALBUTEROL SULFATE 2.5; .5 MG/3ML; MG/3ML
SOLUTION RESPIRATORY (INHALATION)
Status: COMPLETED
Start: 2019-11-10 | End: 2019-11-10

## 2019-11-10 RX ORDER — IPRATROPIUM BROMIDE AND ALBUTEROL SULFATE 2.5; .5 MG/3ML; MG/3ML
3 SOLUTION RESPIRATORY (INHALATION)
Status: DISCONTINUED | OUTPATIENT
Start: 2019-11-10 | End: 2019-11-11

## 2019-11-10 RX ADMIN — IPRATROPIUM BROMIDE AND ALBUTEROL SULFATE 3 ML: 2.5; .5 SOLUTION RESPIRATORY (INHALATION) at 23:49

## 2019-11-10 RX ADMIN — METHYLPREDNISOLONE SODIUM SUCCINATE 125 MG: 125 INJECTION, POWDER, LYOPHILIZED, FOR SOLUTION INTRAMUSCULAR; INTRAVENOUS at 20:32

## 2019-11-10 RX ADMIN — ACETYLCYSTEINE 600 MG: 200 SOLUTION ORAL; RESPIRATORY (INHALATION) at 23:49

## 2019-11-10 RX ADMIN — IPRATROPIUM BROMIDE 0.5 MG: 0.5 SOLUTION RESPIRATORY (INHALATION) at 21:16

## 2019-11-10 RX ADMIN — MAGNESIUM SULFATE HEPTAHYDRATE 2 G: 40 INJECTION, SOLUTION INTRAVENOUS at 20:42

## 2019-11-10 RX ADMIN — METHYLPREDNISOLONE SODIUM SUCCINATE 125 MG: 125 INJECTION, POWDER, FOR SOLUTION INTRAMUSCULAR; INTRAVENOUS at 20:32

## 2019-11-10 RX ADMIN — IPRATROPIUM BROMIDE AND ALBUTEROL SULFATE 3 ML: 2.5; .5 SOLUTION RESPIRATORY (INHALATION) at 20:30

## 2019-11-10 RX ADMIN — ALBUTEROL SULFATE 5 MG: 2.5 SOLUTION RESPIRATORY (INHALATION) at 21:16

## 2019-11-10 RX ADMIN — IPRATROPIUM BROMIDE AND ALBUTEROL SULFATE 3 ML: 2.5; .5 SOLUTION RESPIRATORY (INHALATION) at 21:50

## 2019-11-10 RX ADMIN — ASPIRIN 325 MG: 325 TABLET, FILM COATED ORAL at 23:14

## 2019-11-11 ENCOUNTER — APPOINTMENT (OUTPATIENT)
Dept: RADIOLOGY | Facility: HOSPITAL | Age: 74
DRG: 189 | End: 2019-11-11
Payer: MEDICARE

## 2019-11-11 ENCOUNTER — APPOINTMENT (INPATIENT)
Dept: NON INVASIVE DIAGNOSTICS | Facility: HOSPITAL | Age: 74
DRG: 189 | End: 2019-11-11
Payer: MEDICARE

## 2019-11-11 LAB
ALBUMIN SERPL BCP-MCNC: 3.6 G/DL (ref 3.5–5)
ALP SERPL-CCNC: 78 U/L (ref 46–116)
ALT SERPL W P-5'-P-CCNC: 37 U/L (ref 12–78)
ANION GAP SERPL CALCULATED.3IONS-SCNC: 12 MMOL/L (ref 4–13)
AST SERPL W P-5'-P-CCNC: 25 U/L (ref 5–45)
BASOPHILS # BLD AUTO: 0.03 THOUSANDS/ΜL (ref 0–0.1)
BASOPHILS NFR BLD AUTO: 0 % (ref 0–1)
BILIRUB SERPL-MCNC: 0.6 MG/DL (ref 0.2–1)
BUN SERPL-MCNC: 14 MG/DL (ref 5–25)
CALCIUM SERPL-MCNC: 8.7 MG/DL (ref 8.3–10.1)
CHLORIDE SERPL-SCNC: 103 MMOL/L (ref 100–108)
CHOLEST SERPL-MCNC: 167 MG/DL (ref 50–200)
CO2 SERPL-SCNC: 24 MMOL/L (ref 21–32)
CREAT SERPL-MCNC: 1.35 MG/DL (ref 0.6–1.3)
EOSINOPHIL # BLD AUTO: 0.01 THOUSAND/ΜL (ref 0–0.61)
EOSINOPHIL NFR BLD AUTO: 0 % (ref 0–6)
ERYTHROCYTE [DISTWIDTH] IN BLOOD BY AUTOMATED COUNT: 12.5 % (ref 11.6–15.1)
GFR SERPL CREATININE-BSD FRML MDRD: 51 ML/MIN/1.73SQ M
GLUCOSE SERPL-MCNC: 171 MG/DL (ref 65–140)
HCT VFR BLD AUTO: 42.3 % (ref 36.5–49.3)
HDLC SERPL-MCNC: 40 MG/DL
HGB BLD-MCNC: 14.3 G/DL (ref 12–17)
IMM GRANULOCYTES # BLD AUTO: 0.05 THOUSAND/UL (ref 0–0.2)
IMM GRANULOCYTES NFR BLD AUTO: 1 % (ref 0–2)
LDLC SERPL CALC-MCNC: 114 MG/DL (ref 0–100)
LYMPHOCYTES # BLD AUTO: 0.63 THOUSANDS/ΜL (ref 0.6–4.47)
LYMPHOCYTES NFR BLD AUTO: 9 % (ref 14–44)
MAGNESIUM SERPL-MCNC: 2.1 MG/DL (ref 1.6–2.6)
MCH RBC QN AUTO: 32.1 PG (ref 26.8–34.3)
MCHC RBC AUTO-ENTMCNC: 33.8 G/DL (ref 31.4–37.4)
MCV RBC AUTO: 95 FL (ref 82–98)
MONOCYTES # BLD AUTO: 0.12 THOUSAND/ΜL (ref 0.17–1.22)
MONOCYTES NFR BLD AUTO: 2 % (ref 4–12)
NEUTROPHILS # BLD AUTO: 6.39 THOUSANDS/ΜL (ref 1.85–7.62)
NEUTS SEG NFR BLD AUTO: 88 % (ref 43–75)
NONHDLC SERPL-MCNC: 127 MG/DL
NRBC BLD AUTO-RTO: 0 /100 WBCS
PHOSPHATE SERPL-MCNC: 2.6 MG/DL (ref 2.3–4.1)
PLATELET # BLD AUTO: 180 THOUSANDS/UL (ref 149–390)
PMV BLD AUTO: 8.8 FL (ref 8.9–12.7)
POTASSIUM SERPL-SCNC: 4.1 MMOL/L (ref 3.5–5.3)
PROT SERPL-MCNC: 7.1 G/DL (ref 6.4–8.2)
RBC # BLD AUTO: 4.46 MILLION/UL (ref 3.88–5.62)
SODIUM SERPL-SCNC: 139 MMOL/L (ref 136–145)
TRIGL SERPL-MCNC: 66 MG/DL
TROPONIN I SERPL-MCNC: 0.05 NG/ML
TROPONIN I SERPL-MCNC: 0.07 NG/ML
TROPONIN I SERPL-MCNC: 0.1 NG/ML
WBC # BLD AUTO: 7.23 THOUSAND/UL (ref 4.31–10.16)

## 2019-11-11 PROCEDURE — 80053 COMPREHEN METABOLIC PANEL: CPT | Performed by: STUDENT IN AN ORGANIZED HEALTH CARE EDUCATION/TRAINING PROGRAM

## 2019-11-11 PROCEDURE — 99223 1ST HOSP IP/OBS HIGH 75: CPT | Performed by: FAMILY MEDICINE

## 2019-11-11 PROCEDURE — 94762 N-INVAS EAR/PLS OXIMTRY CONT: CPT

## 2019-11-11 PROCEDURE — A9502 TC99M TETROFOSMIN: HCPCS

## 2019-11-11 PROCEDURE — 84484 ASSAY OF TROPONIN QUANT: CPT | Performed by: STUDENT IN AN ORGANIZED HEALTH CARE EDUCATION/TRAINING PROGRAM

## 2019-11-11 PROCEDURE — 80061 LIPID PANEL: CPT | Performed by: NURSE PRACTITIONER

## 2019-11-11 PROCEDURE — 78452 HT MUSCLE IMAGE SPECT MULT: CPT

## 2019-11-11 PROCEDURE — 99232 SBSQ HOSP IP/OBS MODERATE 35: CPT | Performed by: INTERNAL MEDICINE

## 2019-11-11 PROCEDURE — 83735 ASSAY OF MAGNESIUM: CPT | Performed by: STUDENT IN AN ORGANIZED HEALTH CARE EDUCATION/TRAINING PROGRAM

## 2019-11-11 PROCEDURE — 93017 CV STRESS TEST TRACING ONLY: CPT

## 2019-11-11 PROCEDURE — 85025 COMPLETE CBC W/AUTO DIFF WBC: CPT | Performed by: STUDENT IN AN ORGANIZED HEALTH CARE EDUCATION/TRAINING PROGRAM

## 2019-11-11 PROCEDURE — 84100 ASSAY OF PHOSPHORUS: CPT | Performed by: STUDENT IN AN ORGANIZED HEALTH CARE EDUCATION/TRAINING PROGRAM

## 2019-11-11 PROCEDURE — 93005 ELECTROCARDIOGRAM TRACING: CPT

## 2019-11-11 PROCEDURE — 94760 N-INVAS EAR/PLS OXIMETRY 1: CPT

## 2019-11-11 PROCEDURE — 99223 1ST HOSP IP/OBS HIGH 75: CPT | Performed by: INTERNAL MEDICINE

## 2019-11-11 PROCEDURE — 87081 CULTURE SCREEN ONLY: CPT | Performed by: STUDENT IN AN ORGANIZED HEALTH CARE EDUCATION/TRAINING PROGRAM

## 2019-11-11 PROCEDURE — 94640 AIRWAY INHALATION TREATMENT: CPT

## 2019-11-11 RX ORDER — IPRATROPIUM BROMIDE AND ALBUTEROL SULFATE 2.5; .5 MG/3ML; MG/3ML
3 SOLUTION RESPIRATORY (INHALATION)
Status: DISCONTINUED | OUTPATIENT
Start: 2019-11-11 | End: 2019-11-12

## 2019-11-11 RX ORDER — METHYLPREDNISOLONE SODIUM SUCCINATE 40 MG/ML
40 INJECTION, POWDER, LYOPHILIZED, FOR SOLUTION INTRAMUSCULAR; INTRAVENOUS EVERY 12 HOURS SCHEDULED
Status: DISCONTINUED | OUTPATIENT
Start: 2019-11-12 | End: 2019-11-15 | Stop reason: HOSPADM

## 2019-11-11 RX ORDER — SODIUM CHLORIDE 9 MG/ML
75 INJECTION, SOLUTION INTRAVENOUS CONTINUOUS
Status: DISCONTINUED | OUTPATIENT
Start: 2019-11-11 | End: 2019-11-11

## 2019-11-11 RX ORDER — METHYLPREDNISOLONE SODIUM SUCCINATE 125 MG/2ML
60 INJECTION, POWDER, LYOPHILIZED, FOR SOLUTION INTRAMUSCULAR; INTRAVENOUS EVERY 8 HOURS SCHEDULED
Status: DISCONTINUED | OUTPATIENT
Start: 2019-11-11 | End: 2019-11-11

## 2019-11-11 RX ORDER — TAMSULOSIN HYDROCHLORIDE 0.4 MG/1
0.4 CAPSULE ORAL DAILY
Status: DISCONTINUED | OUTPATIENT
Start: 2019-11-11 | End: 2019-11-15 | Stop reason: HOSPADM

## 2019-11-11 RX ORDER — MONTELUKAST SODIUM 4 MG/1
1 TABLET, CHEWABLE ORAL 2 TIMES DAILY
Status: DISCONTINUED | OUTPATIENT
Start: 2019-11-11 | End: 2019-11-14

## 2019-11-11 RX ORDER — PANTOPRAZOLE SODIUM 40 MG/1
40 TABLET, DELAYED RELEASE ORAL
Status: DISCONTINUED | OUTPATIENT
Start: 2019-11-11 | End: 2019-11-15 | Stop reason: HOSPADM

## 2019-11-11 RX ORDER — FLUTICASONE PROPIONATE 50 MCG
2 SPRAY, SUSPENSION (ML) NASAL DAILY
Status: DISCONTINUED | OUTPATIENT
Start: 2019-11-11 | End: 2019-11-15 | Stop reason: HOSPADM

## 2019-11-11 RX ORDER — ASPIRIN 81 MG/1
81 TABLET ORAL DAILY
Status: DISCONTINUED | OUTPATIENT
Start: 2019-11-11 | End: 2019-11-15 | Stop reason: HOSPADM

## 2019-11-11 RX ORDER — ZOLPIDEM TARTRATE 5 MG/1
10 TABLET ORAL
Status: DISCONTINUED | OUTPATIENT
Start: 2019-11-11 | End: 2019-11-15 | Stop reason: HOSPADM

## 2019-11-11 RX ORDER — AMLODIPINE BESYLATE 10 MG/1
10 TABLET ORAL DAILY
Status: DISCONTINUED | OUTPATIENT
Start: 2019-11-11 | End: 2019-11-15 | Stop reason: HOSPADM

## 2019-11-11 RX ORDER — DOBUTAMINE HYDROCHLORIDE 200 MG/100ML
2.5 INJECTION INTRAVENOUS CONTINUOUS
Status: DISCONTINUED | OUTPATIENT
Start: 2019-11-11 | End: 2019-11-11

## 2019-11-11 RX ORDER — GABAPENTIN 300 MG/1
300 CAPSULE ORAL 3 TIMES DAILY
Status: DISCONTINUED | OUTPATIENT
Start: 2019-11-11 | End: 2019-11-15 | Stop reason: HOSPADM

## 2019-11-11 RX ORDER — ACETYLCYSTEINE 200 MG/ML
3 SOLUTION ORAL; RESPIRATORY (INHALATION)
Status: DISCONTINUED | OUTPATIENT
Start: 2019-11-11 | End: 2019-11-12

## 2019-11-11 RX ORDER — IPRATROPIUM BROMIDE AND ALBUTEROL SULFATE 2.5; .5 MG/3ML; MG/3ML
3 SOLUTION RESPIRATORY (INHALATION) EVERY 2 HOUR PRN
Status: DISCONTINUED | OUTPATIENT
Start: 2019-11-11 | End: 2019-11-15 | Stop reason: HOSPADM

## 2019-11-11 RX ORDER — FINASTERIDE 5 MG/1
5 TABLET, FILM COATED ORAL EVERY MORNING
Status: DISCONTINUED | OUTPATIENT
Start: 2019-11-11 | End: 2019-11-15 | Stop reason: HOSPADM

## 2019-11-11 RX ORDER — FAMOTIDINE 20 MG/1
20 TABLET, FILM COATED ORAL DAILY
Status: DISCONTINUED | OUTPATIENT
Start: 2019-11-11 | End: 2019-11-15 | Stop reason: HOSPADM

## 2019-11-11 RX ADMIN — GABAPENTIN 300 MG: 300 CAPSULE ORAL at 16:49

## 2019-11-11 RX ADMIN — GABAPENTIN 300 MG: 300 CAPSULE ORAL at 09:27

## 2019-11-11 RX ADMIN — ASPIRIN 81 MG: 81 TABLET, COATED ORAL at 11:23

## 2019-11-11 RX ADMIN — ACETYLCYSTEINE 600 MG: 200 SOLUTION ORAL; RESPIRATORY (INHALATION) at 23:07

## 2019-11-11 RX ADMIN — ACETYLCYSTEINE 3 ML: 200 SOLUTION ORAL; RESPIRATORY (INHALATION) at 07:25

## 2019-11-11 RX ADMIN — METHYLPREDNISOLONE SODIUM SUCCINATE 60 MG: 125 INJECTION, POWDER, FOR SOLUTION INTRAMUSCULAR; INTRAVENOUS at 14:59

## 2019-11-11 RX ADMIN — FAMOTIDINE 20 MG: 20 TABLET ORAL at 09:27

## 2019-11-11 RX ADMIN — METHYLPREDNISOLONE SODIUM SUCCINATE 60 MG: 125 INJECTION, POWDER, FOR SOLUTION INTRAMUSCULAR; INTRAVENOUS at 05:08

## 2019-11-11 RX ADMIN — IPRATROPIUM BROMIDE AND ALBUTEROL SULFATE 3 ML: 2.5; .5 SOLUTION RESPIRATORY (INHALATION) at 03:12

## 2019-11-11 RX ADMIN — IPRATROPIUM BROMIDE AND ALBUTEROL SULFATE 3 ML: 2.5; .5 SOLUTION RESPIRATORY (INHALATION) at 23:06

## 2019-11-11 RX ADMIN — GABAPENTIN 300 MG: 300 CAPSULE ORAL at 22:00

## 2019-11-11 RX ADMIN — ZOLPIDEM TARTRATE 10 MG: 5 TABLET, COATED ORAL at 01:00

## 2019-11-11 RX ADMIN — IPRATROPIUM BROMIDE AND ALBUTEROL SULFATE 3 ML: 2.5; .5 SOLUTION RESPIRATORY (INHALATION) at 07:26

## 2019-11-11 RX ADMIN — AMLODIPINE BESYLATE 10 MG: 10 TABLET ORAL at 09:27

## 2019-11-11 RX ADMIN — ACETYLCYSTEINE 600 MG: 200 SOLUTION ORAL; RESPIRATORY (INHALATION) at 03:11

## 2019-11-11 RX ADMIN — PANTOPRAZOLE SODIUM 40 MG: 40 TABLET, DELAYED RELEASE ORAL at 05:08

## 2019-11-11 RX ADMIN — ENOXAPARIN SODIUM 90 MG: 100 INJECTION SUBCUTANEOUS at 01:00

## 2019-11-11 RX ADMIN — ZOLPIDEM TARTRATE 10 MG: 5 TABLET, COATED ORAL at 22:00

## 2019-11-11 RX ADMIN — IPRATROPIUM BROMIDE AND ALBUTEROL SULFATE 3 ML: 2.5; .5 SOLUTION RESPIRATORY (INHALATION) at 15:08

## 2019-11-11 RX ADMIN — IPRATROPIUM BROMIDE AND ALBUTEROL SULFATE 3 ML: 2.5; .5 SOLUTION RESPIRATORY (INHALATION) at 18:44

## 2019-11-11 RX ADMIN — IPRATROPIUM BROMIDE AND ALBUTEROL SULFATE 3 ML: 2.5; .5 SOLUTION RESPIRATORY (INHALATION) at 11:13

## 2019-11-11 RX ADMIN — FLUTICASONE PROPIONATE 2 SPRAY: 50 SPRAY, METERED NASAL at 09:27

## 2019-11-11 RX ADMIN — ENOXAPARIN SODIUM 90 MG: 100 INJECTION SUBCUTANEOUS at 15:03

## 2019-11-11 RX ADMIN — PANTOPRAZOLE SODIUM 40 MG: 40 TABLET, DELAYED RELEASE ORAL at 16:49

## 2019-11-11 RX ADMIN — ACETYLCYSTEINE 3 ML: 200 SOLUTION ORAL; RESPIRATORY (INHALATION) at 11:13

## 2019-11-11 RX ADMIN — TAMSULOSIN HYDROCHLORIDE 0.4 MG: 0.4 CAPSULE ORAL at 09:26

## 2019-11-11 RX ADMIN — FINASTERIDE 5 MG: 5 TABLET, FILM COATED ORAL at 09:27

## 2019-11-11 RX ADMIN — DOBUTAMINE IN DEXTROSE 2.5 MCG/KG/MIN: 200 INJECTION, SOLUTION INTRAVENOUS at 13:34

## 2019-11-11 RX ADMIN — ACETYLCYSTEINE 3 ML: 200 SOLUTION ORAL; RESPIRATORY (INHALATION) at 15:08

## 2019-11-11 RX ADMIN — SODIUM CHLORIDE 75 ML/HR: 0.9 INJECTION, SOLUTION INTRAVENOUS at 02:54

## 2019-11-11 NOTE — ED NOTES
Received pt aaox3 on CM with SR noted  Pt reports continues to feel sob but improved since arrival  Able to speak in complete sentences  Skin warm pink and dry  resp unlabored but rate increased  Lungs cta b/l but inspiratory breath sounds tight , expiratory WNL  IV noted right arm saline lock maintained  Pt presently on 3lnc spo2 96%  Awaiting room assignment   Will repeat trop at 23:30 as ordered      Eugenie Sandy RN  11/10/19 [id number]

## 2019-11-11 NOTE — ASSESSMENT & PLAN NOTE
Patient with a history of severe centrilobular emphysema and AAT deficiency (on weekly Zemaira) injections presented with tachypnea and increased work of breathing, initially placed on BiPAP in the ED  Patient was weaned off of BiPAP, saturating well on 3 L nasal cannula (baseline) with only mild conversational tachypnea and VBG collected was normal   - Received Solu-Medrol 125 mg IV x1 in ED, will continue with Solu-Medrol 60 mg IV q8h  - Duonebs q4h kenneth with Mucomyst, Duonebs q2h prn  - Supplemental O2 as needed to maintain SpO2 >89%, patient normally uses 2-3L at home  - Continuous pulse oximetry monitoring  - Pulm consulted - recommends weaning patient off of 40 mg BID and DuoNebs Q6H as tolerated; discharge with Trelegy outpatient; f/u on new RUL nodules on CXR in 4-6 weeks  - Patient's dyspnea on exertion is stable and unimproved   · Pulm ordered another CXR to identify any changes since admission - no interval changes were noted  · Pulm ordered Pulmonary Function Test to see if he may be a candidate for bronchoscopic lung volume reduction with endobronchial valve placement  · Patient discharged with prednisone 40 mg for 7 days until his f/u appointment with Bill Tidwell  Patient will also start taking Trelegy, Perforomist, & Pulmicort     · Based on patient's Home O2 eval, he will be on 3 L of O2 during rest and activity

## 2019-11-11 NOTE — RESPIRATORY THERAPY NOTE
RT Protocol Note  Jay Jay Wilcox 75 y o  male MRN: [id number]  Unit/Bed#: [address] [place] [address] Encounter: [id number]    Assessment    Principal Problem:    Acute on chronic respiratory failure with hypoxia ([facility] )  Active Problems:    AAT (alpha-1-antitrypsin) deficiency (HCC)    Gastroesophageal reflux disease    Essential hypertension    BPH (benign prostatic hyperplasia)    Former smoker    Chronic venous insufficiency    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 (HCC)    Pulmonary hypertension (HCC)    NSTEMI (non-ST elevated myocardial infarction) ([facility] )      Home Pulmonary Medications:  Albuterol  Home Devices/Therapy: Home O2    Past Medical History:   Diagnosis Date    Anesthesia complication     Difficult to wake up    Arthritis     BPH (benign prostatic hyperplasia)     urinary frequency    Cancer (HCC)     basal cell neck, face    [facility]ch[facility] disease[facility]     Full dentures     Hiatal hernia     Hypertension     controlled    Irritable bowel syndrome     Kidney stone     at least 7 episodes    Liver disease     Alpha 1- enzyme deficiency - diagnosed   has been on weekly replacement therapy since then    Pulmonary emphysema ([facility] )     1/25/15  FEV1 - 2 45 liters or 59% of predicted    RSV infection 2017    Wears glasses     for driving only     Social History     Socioeconomic History    Marital status:       Spouse name: None    Number of children: None    Years of education: None    Highest education level: None   Occupational History    None   Social Needs    Financial resource strain: None    Food insecurity:     Worry: None     Inability: None    Transportation needs:     Medical: None     Non-medical: None   Tobacco Use    Smoking status: Former Smoker     Packs/day: 1 00     Years: 60 00     Pack years: 60 00     Last attempt to quit: 2017     Years since quittin 1    Smokeless tobacco: Never Used    Tobacco comment: quit in august 2017   Substance and Sexual Activity    Alcohol use: Not Currently     Comment: stopped in 2009    Drug use: No    Sexual activity: None   Lifestyle    Physical activity:     Days per week: None     Minutes per session: None    Stress: None   Relationships    Social connections:     Talks on phone: None     Gets together: None     Attends Religion service: None     Active member of club or organization: None     Attends meetings of clubs or organizations: None     Relationship status: None    Intimate partner violence:     Fear of current or ex partner: None     Emotionally abused: None     Physically abused: None     Forced sexual activity: None   Other Topics Concern    None   Social History Narrative    None       Subjective         Objective    Physical Exam:   Assessment Type: Assess only  General Appearance: Awake, Alert  Respiratory Pattern: Dyspnea at rest, Dyspnea with exertion  Chest Assessment: Chest expansion symmetrical  Bilateral Breath Sounds: Clear, Diminished  Cough: None  O2 Device: NC    Vitals:  Blood pressure (!) 172/79, pulse 86, temperature 97 8 °F (36 6 °C), temperature source Tympanic, resp  rate 22, height 6' 5" (1 956 m), weight 87 4 kg (192 lb 10 9 oz), SpO2 96 %                O2 Device: NC     Plan    Respiratory Plan: Home Bronchodilator Patient pathway, Vent/NIV/HFNC

## 2019-11-11 NOTE — UTILIZATION REVIEW
Initial Clinical Review    PATIENT WAS OBSERVATION STATUS 11/10/19 CONVERTED TO INPATIENT ADMISSION 11/11/19 FOR CONTINUED TREATMENT     Admission: Date/Time/Statement: Inpatient Admission Orders (From admission, onward)     Ordered        11/11/19 1359  Inpatient Admission  Once                   Orders Placed This Encounter   Procedures    Inpatient Admission     Standing Status:   Standing     Number of Occurrences:   1     Order Specific Question:   Admitting Physician     Answer:   Norman Burciaga     Order Specific Question:   Level of Care     Answer:   Med Surg [16]     Order Specific Question:   Estimated length of stay     Answer:   More than 2 Midnights     Order Specific Question:   Certification     Answer:   I certify that inpatient services are medically necessary for this patient for a duration of greater than two midnights  See H&P and MD Progress Notes for additional information about the patient's course of treatment  Comments:   Under going cardiac testing secondary to NSTEMI     ED Arrival Information     Expected Arrival Acuity Means of Arrival Escorted By Service Admission Type    - 11/10/2019 20:11 Emergent Walk-In Self General Medicine Emergency    Arrival Complaint    Diff breathing        Chief Complaint   Patient presents with    Shortness of Breath     pt c/o SOB x couple days, worsening as day went on today  wears home O2 2-3 L  Assessment/Plan:   PER CARDIOLOGY  Assessment:  1  Mi type 2 secondary to demand of acute hypoxic respiratory failure:  Peak troponin 0 13  2  Acute on chronic hypoxic respiratory failure  3  Hypertension   4  Chronic emphysema with alpha 1 antitrypsin deficiency  Plan:  Patient has been admitted to [facility] service  1  Will obtain dobutamine nuclear stress test to evaluate for ischemia  2  Avoid use of beta-blockers secondary to severe emphysema  3  Obtain fasting lipids   4  Aspirin 81 mg once a day   5   Continue IV steroids per primary team  6  Oxygen as needed      ED Triage Vitals   Temperature Pulse Respirations Blood Pressure SpO2   11/10/19 2016 11/10/19 2016 11/10/19 2016 11/10/19 2016 11/10/19 2016   97 8 °F (36 6 °C) 88 (!) 40 (!) 201/94 94 %      Temp Source Heart Rate Source Patient Position - Orthostatic VS BP Location FiO2 (%)   11/10/19 2016 11/10/19 2016 11/10/19 2320 11/10/19 2320 --   Tympanic Monitor Sitting Right arm       Pain Score       11/10/19 2016       No Pain        Wt Readings from Last 1 Encounters:   11/11/19 87 4 kg (192 lb 10 9 oz)     Additional Vital Signs:   11/11/19 0410  98 2 °F (36 8 °C)  85  22  156/76  97 %       Pertinent Labs/Diagnostic Test Results:   Results from last 7 days   Lab Units 11/11/19  0309 11/10/19  2031   WBC Thousand/uL 7 23 8 44   HEMOGLOBIN g/dL 14 3 15 6   HEMATOCRIT % 42 3 45 9   PLATELETS Thousands/uL 180 206   NEUTROS ABS Thousands/µL 6 39 4 89         Results from last 7 days   Lab Units 11/11/19  0309 11/10/19  2032   SODIUM mmol/L 139 141   POTASSIUM mmol/L 4 1 3 9   CHLORIDE mmol/L 103 104   CO2 mmol/L 24 27   ANION GAP mmol/L 12 10   BUN mg/dL 14 12   CREATININE mg/dL 1 35* 1 29   EGFR ml/min/1 73sq m 51 54   CALCIUM mg/dL 8 7 9 5   MAGNESIUM mg/dL 2 1  --    PHOSPHORUS mg/dL 2 6  --      Results from last 7 days   Lab Units 11/11/19 0309   AST U/L 25   ALT U/L 37   ALK PHOS U/L 78   TOTAL PROTEIN g/dL 7 1   ALBUMIN g/dL 3 6   TOTAL BILIRUBIN mg/dL 0 60         Results from last 7 days   Lab Units 11/11/19  0309 11/10/19  2032   GLUCOSE RANDOM mg/dL 171* 115             No results found for: BETA-HYDROXYBUTYRATE       Results from last 7 days   Lab Units 11/10/19  2031   PH JOIE  7 402*   PCO2 JOIE mm Hg 36 9*   PO2 JOIE mm Hg 54 2*   HCO3 JOIE mmol/L 22 4*   BASE EXC JOIE mmol/L -1 8   O2 CONTENT JOIE ml/dL 19 8   O2 HGB, VENOUS % 87 5*             Results from last 7 days   Lab Units 11/11/19  0748 11/11/19  0309 11/10/19  2342 11/10/19  2031   TROPONIN I ng/mL 0 05* 0 07* 0 10* 0 13*         Results from last 7 days   Lab Units 11/10/19  2031   PROTIME seconds 10 7   INR  0 99   PTT seconds 26                         Results from last 7 days   Lab Units 11/10/19  2032   NT-PRO BNP pg/mL 170     ED Treatment:   Medication Administration from 11/10/2019 2011 to 11/11/2019 0031       Date/Time Order Dose Route Action Action by Comments     11/10/2019 2140 magnesium sulfate 2 g/50 mL IVPB (premix) 2 g 0 g Intravenous Stopped Yue Harp RN      11/10/2019 2042 magnesium sulfate 2 g/50 mL IVPB (premix) 2 g 2 g Intravenous [address] Yue Harp, [address]      11/10/2019 2030 ipratropium-albuterol (DUO-NEB) 0 5-2 5 mg/3 mL inhalation solution 3 mL 3 mL Nebulization Given Yue Harp RN      11/10/2019 2032 methylPREDNISolone sodium succinate (Solu-MEDROL) injection 125 mg 125 mg Intravenous Given Yue Harp RN      11/10/2019 2116 albuterol inhalation solution 5 mg 5 mg Nebulization Given Barabara Morris, RT      11/10/2019 2116 ipratropium (ATROVENT) 0 02 % inhalation solution 0 5 mg 0 5 mg Nebulization Given Barabara Morris, RT      11/10/2019 2150 ipratropium-albuterol (DUO-NEB) 0 5-2 5 mg/3 mL inhalation solution 3 mL 3 mL Nebulization Given Barabara Morris, RT      11/10/2019 2314 aspirin tablet 325 mg 325 mg Oral Given Chris Aviles RN      11/10/2019 2349 acetylcysteine (MUCOMYST) 200 mg/mL inhalation solution 600 mg 600 mg Nebulization Given Uma Corona RN      11/10/2019 2349 ipratropium-albuterol (DUO-NEB) 0 5-2 5 mg/3 mL inhalation solution 3 mL 3 mL Nebulization Given Uma Corona RN         Past Medical History:   Diagnosis Date    Anesthesia complication     Difficult to wake up    Arthritis     BPH (benign prostatic hyperplasia)     urinary frequency    Cancer (HCC)     basal cell neck, face    COPD (chronic obstructive pulmonary disease) (HCC)     Full dentures     Hiatal hernia     History of methicillin resistant staphylococcus aureus (MRSA)     10/11/2018 MRSA (nares) positive    Hypertension     controlled    Irritable bowel syndrome     Kidney stone     at least 7 episodes    Liver disease     Alpha 1- enzyme deficiency - diagnosed 2002  has been on weekly replacement therapy since then    Pulmonary emphysema ([address] )     1/25/15  FEV1 - 2 45 liters or 59% of predicted    RSV infection 12/2017    Wears glasses     for driving only     Present on Admission:   Gastroesophageal reflux disease   Essential hypertension   BPH (benign prostatic hyperplasia)   Acute on chronic respiratory failure with hypoxia (HCC)   Pulmonary hypertension (HCC)   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 (HCC)   AAT (alpha-1-antitrypsin) deficiency (HCC)      Admitting Diagnosis: Difficulty breathing [R06 [id number]]  Age/Sex: 75 y o  male  Admission Orders:  INPATIENT ADMISSION  NPO AFTER MN  STRESS TESTING  Scheduled Medications:    Medications:  acetylcysteine 3 mL Nebulization Q4H [address]   And      ipratropium-albuterol 3 mL Nebulization Q4H   amLODIPine 10 mg Oral Daily   aspirin 81 mg Oral Daily   colestipol 1 g Oral BID   enoxaparin 1 mg/kg Subcutaneous Q12H CORINA   famotidine 20 mg Oral Daily   finasteride 5 mg Oral QAM   fluticasone 2 spray Each Nare Daily   gabapentin 300 mg Oral TID   methylPREDNISolone sodium succinate 60 mg Intravenous Q8H [address]   pantoprazole 40 mg Oral BID AC   tamsulosin 0 4 mg Oral Daily   zolpidem 10 mg Oral HS     Continuous IV Infusions:    DOBUTamine in dextrose 5% 2 5 mcg/kg/min Intravenous Continuous   sodium chloride 75 mL/hr Intravenous Continuous     PRN Meds:    ipratropium-albuterol 3 mL Nebulization Q2H PRN       IP CONSULT TO CARDIOLOGY  IP CONSULT TO PULMONOLOGY    Network Utilization Review Department  [email or web address]  ATTENTION: Please call with any questions or concerns to [phone] and carefully listen to the prompts so that you are directed to the right person   All voicemails are tc Huerta all requests for admission clinical reviews, approved or denied determinations and any other requests to dedicated fax number below belonging to the campus where the patient is receiving treatment    FACILITY NAME UR FAX NUMBER   ADMISSION DENIALS (Administrative/Medical Necessity) [address] [facility] (Maternity/NICU/Pediatrics) [phone]   [facility] [address] [facility] [address] [facility] [phone]   [id number] [facility] [phone]   [facility] [address] [phone]

## 2019-11-11 NOTE — CONSULTS
Consultation - Cardiology   Jesus Ortega 75 y o  male MRN: [id number]  Unit/Bed#: [facility] 307-01 Encounter: [id number]    Assessment/Plan     Assessment:  1  Mi type 2 secondary to demand of acute hypoxic respiratory failure:  Peak troponin 0 13  2  Acute on chronic hypoxic respiratory failure  3  Hypertension   4  Chronic emphysema with alpha 1 antitrypsin deficiency    Plan:  Patient has been admitted to [facility] service  1  Will obtain dobutamine nuclear stress test to evaluate for ischemia  2  Avoid use of beta-blockers secondary to severe emphysema  3  Obtain fasting lipids   4  Aspirin 81 mg once a day   5  Continue IV steroids per primary team  6  Oxygen as needed  History of Present Illness   Physician Requesting Consult: Paddy Sterling MD  Reason for Consult / Principal Problem:  Abnormal troponins concerning for MI type 2 in setting of acute exacerbation of emphysema    HPI: Jesus Ortega is a 75y o  year old male who has a longstanding history of severe centrilobular emphysema and alpha 1 antitrypsin deficiency  He presented to the emergency room with complaints of increasing shortness of breath times 2-3 days  He did note he had some chest pressure with his shortness of breath  Patient was initially placed on BiPAP and received Solu-Medrol 125 mg IV x1  Patient was able to be weaned off BiPAP and back to nasal cannula  Patient chronically uses 2-3 L of oxygen at home  On labs in the emergency room, 1st troponin was 0 13 and has subsequently trended downward  EKG demonstrates sinus rhythm with nonspecific ST segment changes  Patient is currently chest pain-free and does not offer any complaints  Patient does have a history of hypertension, but states he has never had any chest heaviness which was associated with diaphoresis, nausea or radiation  Patient states he attempts to stay active as possible within the means of his restrictions of his emphysema    He feels since starting on IV steroids his breathing has improved, but he does not feel as if he is back to his baseline  Patient states no matter how bad his respiratory status is he very rarely experiences wheezing  He denies any family history of coronary artery disease  Patient echocardiogram in July of 2019 which demonstrated ejection fraction of 60%, moderate concentric hypertrophy of the left ventricle, grade 1 diastolic dysfunction, mild dilatation of right atrium and right ventricle, mild mitral and tricuspid valve regurgitation with estimated peak PA pressure of 51 mm Hg  Inpatient consult to Cardiology  Consult performed by: TOPHER Rios  Consult ordered by: Wesley Gross MD          Review of Systems   Constitutional: Positive for activity change  Negative for fatigue  HENT: Negative  Negative for congestion, facial swelling, nosebleeds, sinus pain and tinnitus  Eyes: Negative  Negative for photophobia and visual disturbance  Respiratory: Positive for cough, chest tightness and shortness of breath  Negative for wheezing  Cardiovascular: Negative  Negative for chest pain, palpitations and leg swelling  Gastrointestinal: Negative  Negative for abdominal distention, constipation, diarrhea and nausea  Endocrine: Negative  Negative for polydipsia, polyphagia and polyuria  Genitourinary: Negative  Musculoskeletal: Negative  Negative for arthralgias and gait problem  Skin: Negative  Allergic/Immunologic: Negative  Neurological: Negative for dizziness, syncope, weakness and light-headedness  Hematological: Negative  Psychiatric/Behavioral: Negative          Historical Information   Past Medical History:   Diagnosis Date    Anesthesia complication     Difficult to wake up    Arthritis     BPH (benign prostatic hyperplasia)     urinary frequency    Cancer (HCC)     basal cell neck, face    COPD (chronic obstructive pulmonary disease) (HCC)     Full dentures     Hiatal hernia     History of methicillin resistant staphylococcus aureus (MRSA)     10/11/2018 MRSA (nares) positive    Hypertension     controlled    Irritable bowel syndrome     Kidney stone     at least 7 episodes    Liver disease     Alpha 1- enzyme deficiency - diagnosed   has been on weekly replacement therapy since then    Pulmonary emphysema ([address] )     1/25/15  FEV1 - 2 45 liters or 59% of predicted    RSV infection 2017    Wears glasses     for driving only     Past Surgical History:   Procedure Laterality Date    BACK SURGERY      discectomy    COLONOSCOPY      COLONOSCOPY N/A 3/10/2017    Procedure: Shiela Marie;  Surgeon: Rashi Florian MD;  Location: [facility] GI LAB; Service:    Ammon Brink      removal of kidney stones    ESOPHAGOGASTRODUODENOSCOPY N/A 3/10/2017    Procedure: ESOPHAGOGASTRODUODENOSCOPY (EGD); Surgeon: Rashi Florian MD;  Location: [facility] GI LAB; Service:     LITHOTRIPSY      [place] ESOPHAGOGASTRODUODENOSCOPY TRANSORAL DIAGNOSTIC N/A 2018    Procedure: ESOPHAGOGASTRODUODENOSCOPY (EGD); Surgeon: Rashi Florian MD;  Location: [facility] GI LAB;   Service: Gastroenterology    TONSILLECTOMY      VEIN LIGATION AND STRIPPING Bilateral          Social History     Substance and Sexual Activity   Alcohol Use Not Currently    Comment: stopped in      Social History     Substance and Sexual Activity   Drug Use No     Social History     Tobacco Use   Smoking Status Former Smoker    Packs/day: 1 00    Years: 60 00    Pack years: 60 00    Last attempt to quit: 2017    Years since quittin 1   Smokeless Tobacco Never Used   Tobacco Comment    quit in 2017     Family History:   Family History   Problem Relation Age of Onset    Emphysema Mother         never smoked    Emphysema Father     Cancer Brother         colon    Colon cancer Brother     Ulcerative colitis Family     Liver disease Family        Meds/Allergies   all current active meds have been reviewed, current meds:   Current Facility-Administered Medications   Medication Dose Route Frequency    acetylcysteine (MUCOMYST) 200 mg/mL inhalation solution 600 mg  3 mL Nebulization Q4H [address]    And    ipratropium-albuterol (DUO-NEB) 0 5-2 5 mg/3 mL inhalation solution 3 mL  3 mL Nebulization Q4H    amLODIPine (NORVASC) tablet 10 mg  10 mg Oral Daily    colestipol (COLESTID) tablet 1 g  1 g Oral BID    enoxaparin (LOVENOX) subcutaneous injection 90 mg  1 mg/kg Subcutaneous Q12H CORINA    famotidine (PEPCID) tablet 20 mg  20 mg Oral Daily    finasteride (PROSCAR) tablet 5 mg  5 mg Oral QAM    fluticasone (FLONASE) 50 mcg/act nasal spray 2 spray  2 spray Each Nare Daily    gabapentin (NEURONTIN) capsule 300 mg  300 mg Oral TID    ipratropium-albuterol (DUO-NEB) 0 5-2 5 mg/3 mL inhalation solution 3 mL  3 mL Nebulization Q2H PRN    methylPREDNISolone sodium succinate (Solu-MEDROL) injection 60 mg  60 mg Intravenous Q8H [address]    pantoprazole (PROTONIX) EC tablet 40 mg  40 mg Oral BID AC    sodium chloride 0 9 % infusion  75 mL/hr Intravenous Continuous    tamsulosin (FLOMAX) capsule 0 4 mg  0 4 mg Oral Daily    zolpidem (AMBIEN) tablet 10 mg  10 mg Oral HS    and PTA meds:   Prior to Admission Medications   Prescriptions Last Dose Informant Patient Reported? Taking? OXYGEN-HELIUM IN  Self Yes No   Sig: Inhale 2L at rest- 3L with activity   TRELEGY ELLIPTA 100-62 5-25 MCG/INH inhaler 11/10/2019 at 0900 Self No Yes   Sig: INHALE ONE PUFF BY MOUTH ONE TIME DAILY   rinse mouth after use    ZEMAIRA injection  Self Yes No   Sig: Once a week-    albuterol (2 5 mg/3 mL) 0 083 % nebulizer solution  Self Yes No   Sig: Inhale 1 each every 4 (four) hours as needed   albuterol (PROVENTIL HFA,VENTOLIN HFA) 90 mcg/act inhaler  Self No No   Sig: Inhale 2 puffs 4 (four) times a day   amLODIPine (NORVASC) 10 mg tablet 11/10/2019 at Unknown time Self No Yes   Sig: TAKE ONE TABLET BY MOUTH ONE TIME DAILY bisacodyl (DULCOLAX) 5 mg EC tablet   No No   Sig: Take 2 tablets (10 mg total) by mouth once for 1 dose Per office instructions   cephalexin (KEFLEX) 500 mg capsule  Self No No   Sig: Take 1 capsule (500 mg total) by mouth every 12 (twelve) hours for 10 days   colestipol (COLESTID) 1 g tablet 11/10/2019 at 0900 Self No Yes   Sig: Take 1 tablet (1 g total) by mouth 2 (two) times a day   finasteride (PROSCAR) 5 mg tablet 11/10/2019 at 0900 Self Yes Yes   Sig: Take 5 mg by mouth every morning     fluticasone (FLONASE) 50 mcg/act nasal spray 11/10/2019 at Unknown time Self No Yes   Sig: USE TWO SPRAYS IN EACH NOSTRIL EVERY DAY    gabapentin (NEURONTIN) 300 mg capsule 11/10/2019 at 0900 Self No Yes   Sig: Take 1 capsule (300 mg total) by mouth 3 (three) times a day   ipratropium-albuterol (DUO-NEB) 0 5-2 5 mg/3 mL nebulizer solution  Self No No   Sig: Take 1 vial (3 mL total) by nebulization 4 (four) times a day   pantoprazole (PROTONIX) 40 mg tablet 11/11/2019 at 0600 Self No Yes   Sig: Take 1 tablet (40 mg total) by mouth 2 (two) times a day   polyethylene glycol (GOLYTELY) 4000 mL solution Unknown at Unknown time  No No   Sig: Take 4,000 mL by mouth once for 1 dose Drink over 3-4 hours starting at 4 PM the evening before your procedure   ranitidine (ZANTAC) 150 mg tablet Not Taking at Unknown time Self No No   Sig: Take 1 tablet (150 mg total) by mouth daily   Patient not taking: Reported on 11/11/2019   tamsulosin (FLOMAX) 0 4 mg 11/10/2019 at 0900 Self No Yes   Sig: TAKE ONE CAPSULE BY MOUTH ONE TIME DAILY    zolpidem (AMBIEN) 10 mg tablet 11/11/2019 at 0000 Self No Yes   Sig: TAKE ONE TABLET BY MOUTH NIGHTLY AT BEDTIME       Facility-Administered Medications: None     Allergies   Allergen Reactions    Chantix [Varenicline]      Caused prostate infection       Objective   Vitals: Blood pressure 154/72, pulse 93, temperature 98 1 °F (36 7 °C), temperature source Oral, resp   rate 20, height 6' 5" (1 956 m), weight 87 4 kg (192 lb 10 9 oz), SpO2 97 %  Orthostatic Blood Pressures      Most Recent Value   Blood Pressure  154/72 filed at 11/11/2019 0756   Patient Position - Orthostatic VS  Lying filed at 11/11/2019 0756            Intake/Output Summary (Last 24 hours) at 11/11/2019 1029  Last data filed at 11/11/2019 0830  Gross per 24 hour   Intake 0 ml   Output 725 ml   Net -725 ml       Invasive Devices     Peripheral Intravenous Line            Peripheral IV (Ped) 11/10/19 Right Antecubital less than 1 day                Physical Exam   Constitutional: He is oriented to person, place, and time  He appears well-developed and well-nourished  No distress  HENT:   Head: Normocephalic and atraumatic  Right Ear: External ear normal    Left Ear: External ear normal    Eyes: Pupils are equal, round, and reactive to light  Conjunctivae are normal  Right eye exhibits no discharge  Left eye exhibits no discharge  No scleral icterus  Neck: Normal range of motion  Neck supple  No JVD present  No thyromegaly present  Cardiovascular: Normal rate, regular rhythm, normal heart sounds and intact distal pulses  Pulmonary/Chest: Effort normal  No accessory muscle usage  No respiratory distress  He has decreased breath sounds in the right upper field, the right lower field, the left upper field, the left middle field and the left lower field  He has no wheezes  He has no rhonchi  He has no rales  Abdominal: Soft  Bowel sounds are normal  He exhibits no distension and no mass  Musculoskeletal: He exhibits no edema  Neurological: He is alert and oriented to person, place, and time  Skin: Skin is warm and dry  Capillary refill takes less than 2 seconds  He is not diaphoretic  Psychiatric: He has a normal mood and affect  His behavior is normal  Judgment and thought content normal        Lab Results:   I have personally reviewed pertinent lab results      CBC with diff:   Results from last 7 days   Lab Units 11/11/19  0309   WBC Thousand/uL 7 23   RBC Million/uL 4 46   HEMOGLOBIN g/dL 14 3   HEMATOCRIT % 42 3   MCV fL 95   MCH pg 32 1   MCHC g/dL 33 8   RDW % 12 5   MPV fL 8 8*   PLATELETS Thousands/uL 180     CMP:   Results from last 7 days   Lab Units 11/11/19  0309   SODIUM mmol/L 139   POTASSIUM mmol/L 4 1   CHLORIDE mmol/L 103   CO2 mmol/L 24   BUN mg/dL 14   CREATININE mg/dL 1 35*   CALCIUM mg/dL 8 7   AST U/L 25   ALT U/L 37   ALK PHOS U/L 78   EGFR ml/min/1 73sq m 51     Troponin:   0   Lab Value Date/Time    TROPONINI 0 05 (H) 11/11/2019 0748    TROPONINI 0 07 (H) 11/11/2019 0309    TROPONINI 0 10 (H) 11/10/2019 2342    TROPONINI 0 13 (H) 11/10/2019 2031    TROPONINI <0 02 06/28/2019 1732    TROPONINI <0 02 10/11/2018 1839    TROPONINI <0 02 03/30/2018 0042    TROPONINI <0 02 03/29/2018 1921    TROPONINI <0 02 02/14/2018 1615    TROPONINI <0 02 12/29/2017 1014     BNP:   Results from last 7 days   Lab Units 11/11/19  0309   POTASSIUM mmol/L 4 1   CHLORIDE mmol/L 103   CO2 mmol/L 24   BUN mg/dL 14   CREATININE mg/dL 1 35*   CALCIUM mg/dL 8 7   EGFR ml/min/1 73sq m 51     Coags:   Results from last 7 days   Lab Units 11/10/19  2031   PTT seconds 26   INR  0 99     TSH:     Magnesium:   Results from last 7 days   Lab Units 11/11/19  0309   MAGNESIUM mg/dL 2 1     Lipid Profile:   pending  Imaging: I have personally reviewed pertinent reports      EKG:  Sinus rhythm with nonspecific ST segment changes  VTE Prophylaxis: Sequential compression device Bruce Cox)     Code Status: Level 1 - Full Code  Advance Directive and Living Will:      Power of :    POLST:      Baljit Segovia, [address]  Cardiology

## 2019-11-11 NOTE — PLAN OF CARE
Problem: Potential for Falls  Goal: Patient will remain free of falls  Description  INTERVENTIONS:  - Assess patient frequently for physical needs  -  Identify cognitive and physical deficits and behaviors that affect risk of falls    -  Florien fall precautions as indicated by assessment   - Educate patient/family on patient safety including physical limitations  - Instruct patient to call for assistance with activity based on assessment  - Modify environment to reduce risk of injury  - Consider OT/PT consult to assist with strengthening/mobility  Outcome: Progressing     Problem: RESPIRATORY - ADULT  Goal: Achieves optimal ventilation and oxygenation  Description  INTERVENTIONS:  - Assess for changes in respiratory status  - Assess for changes in mentation and behavior  - Position to facilitate oxygenation and minimize respiratory effort  - Oxygen administered by appropriate delivery if ordered  - Initiate smoking cessation education as indicated  - Encourage broncho-pulmonary hygiene including cough, deep breathe, Incentive Spirometry  - Assess the need for suctioning and aspirate as needed  - Assess and instruct to report SOB or any respiratory difficulty  - Respiratory Therapy support as indicated  Outcome: Progressing     Problem: GASTROINTESTINAL - ADULT  Goal: Maintains or returns to baseline bowel function  Description  INTERVENTIONS:  - Assess bowel function  - Encourage oral fluids to ensure adequate hydration  - Administer IV fluids if ordered to ensure adequate hydration  - Administer ordered medications as needed  - Encourage mobilization and activity  - Consider nutritional services referral to assist patient with adequate nutrition and appropriate food choices  Outcome: Progressing

## 2019-11-11 NOTE — ED NOTES
Pt is off the bipap and on O2 N/C at 3L    O2 sat is 95% and pt denies any complaints       David Noriega, EMY  11/10/19 0977

## 2019-11-11 NOTE — ASSESSMENT & PLAN NOTE
Patient denies chest pain prior to admission, likely type 2 demand ischemia  - Troponin elevated at 0 13, continued to downtrend in subsequent 3 readings   - EKG: normal sinus rhythm  - Telemetry monitoring  - Will administer ASA 325mg PO x1  - Will start therapeutic Lovenox 1mg/kg IV q12h   Patient was transitioned to prophylactic Lovenox after stress test ruled out myocardial ischemia with LVEF of 75%  - Consult cardiology, appreciate recommendations

## 2019-11-11 NOTE — UTILIZATION REVIEW
Initial Clinical Review    Admission: Date/Time/Statement:   Orders Placed This Encounter   Procedures    Place in Observation (expected length of stay for this patient is less than two midnights)     Standing Status:   Standing     Number of Occurrences:   1     Order Specific Question:   Admitting Physician     Answer:   Courtney Bai     Order Specific Question:   Level of Care     Answer:   Med Surg [16]     ED Arrival Information     Expected Arrival Acuity Means of Arrival Escorted By Service Admission Type    - 11/10/2019 20:11 Emergent Walk-In Self General Medicine Emergency    Arrival Complaint    Diff breathing        Chief Complaint   Patient presents with    Shortness of Breath     pt c/o SOB x couple days, worsening as day went on today  wears home O2 2-3 L  Assessment/Plan: Assessment: This is a 65-year-old male with a PMHx of chronic respiratory failure, HTN, severe centrilobular emphysema, alpha 1 antitrypsin deficiency, GERD, BPH, pulmonary hypertension, and venous ulcer of left lower extremity with varicose veins presenting with shortness of breath and NSTEMI  Patient will be admitted for observation under the service Dr Rubi Chisholm  Patient likely stay for less than two midnights, anticipated discharge to home/self-care      Plan:  Acute on chronic respiratory failure with hypoxia  Patient with a history of severe centrilobular emphysema and AAT deficiency (on weekly Zemaira) injections presented with tachypnea and increased work of breathing, initially placed on BiPAP in the ED   Patient was weaned off of BiPAP, saturating well on 3 L nasal cannula (baseline) with only mild conversational tachypnea and VBG collected was normal   - Received Solu-Medrol 125 mg IV x1 in ED, will continue with Solu-Medrol 60 mg IV q8h  - Duonebs q4h kenneth with Mucomyst, Duonebs q2h prn  - Supplemental O2 as needed to maintain SpO2 >89%, patient normally uses 2-3L at home  - Continuous pulse oximetry monitoring  - Consider pulmonology consults in AM     NSTEMI  Patient denies chest pain prior to admission, likely type 2 demand ischemia  - Troponin elevated at 0 13, will continue to trend  - EKG: normal sinus rhythm  - Telemetry monitoring  - Will administer ASA 325mg PO x1  - Will start therapeutic Lovenox 1mg/kg IV q12h  - Consult cardiology, appreciate recommendations     HTN  Patient hypertensive on admission with -200/80s  - Will continue home amlodipine 10mg PO daily at this time, may need to add an additional anti-hypertensive agent if patient's BP remains elevated     GERD  - Stable, continue home Protonix 40mg PO BID     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  - Stable, continue local wound care  - Follow up with vascular outpatient     Global  - NS 75cc/h  - Monitor and replete electrolytes as needed  - NPO  - DVT ppx: Lovenox 1mg/kg IV q12h     ED Triage Vitals   Temperature Pulse Respirations Blood Pressure SpO2   11/10/19 2016 11/10/19 2016 11/10/19 2016 11/10/19 2016 11/10/19 2016   97 8 °F (36 6 °C) 88 (!) 40 (!) 201/94 94 %      Temp Source Heart Rate Source Patient Position - Orthostatic VS BP Location FiO2 (%)   11/10/19 2016 11/10/19 2016 11/10/19 2320 11/10/19 2320 --   Tympanic Monitor Sitting Right arm       Pain Score       11/10/19 2016       No Pain        Wt Readings from Last 1 Encounters:   11/11/19 87 4 kg (192 lb 10 9 oz)     Additional Vital Signs:   Pertinent Labs/Diagnostic Test Results:   cxr Right upper lobe nodular opacity perhaps inflammatory  Short-term surveillance recommended      COPD    Results from last 7 days   Lab Units 11/11/19  0309 11/10/19  2031   WBC Thousand/uL 7 23 8 44   HEMOGLOBIN g/dL 14 3 15 6   HEMATOCRIT % 42 3 45 9   PLATELETS Thousands/uL 180 206   NEUTROS ABS Thousands/µL 6 39 4 89         Results from last 7 days   Lab Units 11/11/19  0309 11/10/19  2032   SODIUM mmol/L 139 141   POTASSIUM mmol/L 4 1 3 9   CHLORIDE mmol/L 103 104   CO2 mmol/L 24 27   ANION GAP mmol/L 12 10   BUN mg/dL 14 12   CREATININE mg/dL 1 35* 1 29   EGFR ml/min/1 73sq m 51 54   CALCIUM mg/dL 8 7 9 5   MAGNESIUM mg/dL 2 1  --    PHOSPHORUS mg/dL 2 6  --      Results from last 7 days   Lab Units 11/11/19  0309   AST U/L 25   ALT U/L 37   ALK PHOS U/L 78   TOTAL PROTEIN g/dL 7 1   ALBUMIN g/dL 3 6   TOTAL BILIRUBIN mg/dL 0 60         Results from last 7 days   Lab Units 11/11/19  0309 11/10/19  2032   GLUCOSE RANDOM mg/dL 171* 115           Results from last 7 days   Lab Units 11/10/19  2031   PH JOIE  7 402*   PCO2 JOIE mm Hg 36 9*   PO2 JOIE mm Hg 54 2*   HCO3 JOIE mmol/L 22 4*   BASE EXC JOIE mmol/L -1 8   O2 CONTENT JOIE ml/dL 19 8   O2 HGB, VENOUS % 87 5*             Results from last 7 days   Lab Units 11/11/19  0748 11/11/19  0309 11/10/19  2342 11/10/19  2031   TROPONIN I ng/mL 0 05* 0 07* 0 10* 0 13*         Results from last 7 days   Lab Units 11/10/19  2031   PROTIME seconds 10 7   INR  0 99   PTT seconds 26                         Results from last 7 days   Lab Units 11/10/19  2032   NT-PRO BNP pg/mL 170                                                                   ED Treatment:   Medication Administration from 11/10/2019 2011 to 11/11/2019 0031       Date/Time Order Dose Route Action Action by Comments     11/10/2019 2140 magnesium sulfate 2 g/50 mL IVPB (premix) 2 g 0 g Intravenous Stopped Behzad Mueller RN      11/10/2019 2042 magnesium sulfate 2 g/50 mL IVPB (premix) 2 g 2 g Intravenous John 37 Behzad Mueller, 24[facility]      11/10/2019 2030 ipratropium-albuterol (DUO-NEB) 0 5-2 5 mg/3 mL inhalation solution 3 mL 3 mL Nebulization Given Behzad Mueller RN      11/10/2019 2032 methylPREDNISolone sodium succinate (Solu-MEDROL) injection 125 mg 125 mg Intravenous Given Behzad Mueller RN      11/10/2019 2116 albuterol inhalation solution 5 mg 5 mg Nebulization Given RT Ivan      11/10/2019 2116 ipratropium (ATROVENT) 0 02 % inhalation solution 0 5 mg 0 5 mg Nebulization Given Ladona Loose, RT      11/10/2019 2150 ipratropium-albuterol (DUO-NEB) 0 5-2 5 mg/3 mL inhalation solution 3 mL 3 mL Nebulization Given Ladona Loose, RT      11/10/2019 2314 aspirin tablet 325 mg 325 mg Oral Given Phyllistine Nacho, RN      11/10/2019 2349 acetylcysteine (MUCOMYST) 200 mg/mL inhalation solution 600 mg 600 mg Nebulization Given Jing Lincoln RN      11/10/2019 2349 ipratropium-albuterol (DUO-NEB) 0 5-2 5 mg/3 mL inhalation solution 3 mL 3 mL Nebulization Given Jing Lincoln RN         Past Medical History:   Diagnosis Date    Anesthesia complication     Difficult to wake up    Arthritis     BPH (benign prostatic hyperplasia)     urinary frequency    Cancer (HCC)     basal cell neck, face    COPD [facility]chronic obstructive pulmonary disease) ([facility] [address] [facility]     Full dentures     Hiatal hernia     Hypertension     controlled    Irritable bowel syndrome     Kidney stone     at least 7 episodes    Liver disease     Alpha 1- enzyme deficiency - diagnosed 2002   has been on weekly replacement therapy since then    Pulmonary emphysema ([facility] [address] )     1/25/15  FEV1 - 2 45 liters or 59% of predicted    RSV infection 12/2017    Wears glasses     for driving only     Present on Admission:   Gastroesophageal reflux disease   Essential hypertension   BPH (benign prostatic hyperplasia)   Acute on chronic respiratory failure with hypoxia (HCC)   Pulmonary hypertension (HCC)   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 (HCC)   AAT (alpha-1-antitrypsin) deficiency (HCC)      Admitting Diagnosis: Difficulty breathing [R06 [id number]]  Age/Sex: 75 y o  male  Admission Orders:  Observation  Tele mon      Scheduled Medications:    Medications:  acetylcysteine 3 mL Nebulization Q4H [address]   And      ipratropium-albuterol 3 mL Nebulization Q4H   amLODIPine 10 mg Oral Daily   colestipol 1 g Oral BID   enoxaparin 1 mg/kg Subcutaneous Q12H [facility]   famotidine 20 mg Oral Daily   finasteride 5 mg Oral QAM fluticasone 2 spray Each Nare Daily   gabapentin 300 mg Oral TID   methylPREDNISolone sodium succinate 60 mg Intravenous Q8H [address]   pantoprazole 40 mg Oral BID AC   tamsulosin 0 4 mg Oral Daily   zolpidem 10 mg Oral HS     Continuous IV Infusions:    sodium chloride 75 mL/hr Intravenous Continuous     PRN Meds:    ipratropium-albuterol 3 mL Nebulization Q2H PRN       IP CONSULT TO CARDIOLOGY  IP CONSULT TO PULMONOLOGY    Network Utilization Review Department  [email or web address]  ATTENTION: Please call with any questions or concerns to [phone] and carefully listen to the prompts so that you are directed to the right person  All voicemails are confidential   Ronit Mock all requests for admission clinical reviews, approved or denied determinations and any other requests to dedicated fax number below belonging to the campus where the patient is receiving treatment    FACILITY NAME UR FAX NUMBER   ADMISSION DENIALS (Administrative/Medical Necessity) [address] [facility] (Maternity/NICU/Pediatrics) [phone]   [facility] [address] [facility] [phone]   [address] [facility] 1525 [facility] [phone]   [facility] [address] [phone]

## 2019-11-11 NOTE — ED PROVIDER NOTES
History  Chief Complaint   Patient presents with    Shortness of Breath     pt c/o SOB x couple days, worsening as day went on today  wears home O2 2-3 L        HPI    This is a 75 year male that presents today with shortness of breath  Patient states shortness of breath for the past couple days worsening as the day went on  He normally wears oxygen 2-3 L  States he has history of edema  States sometimes he gets like this when he can't breathe  Patient denies any fevers or chills or cough  75 year male that presents with shortness of breath  Patient on arrival tachypneic with increased work of breathing  Diminished breath sounds with wheezing  Patient does appear to be anxious  Will do BiPAP  Will do steroids along with breathing treatments and magnesium  Prior to Admission Medications   Prescriptions Last Dose Informant Patient Reported? Taking? OXYGEN-HELIUM IN  Self Yes No   Sig: Inhale 2L at rest- 3L with activity   TRELEGY ELLIPTA 100-62 5-25 MCG/INH inhaler 11/10/2019 at 0900 Self No Yes   Sig: INHALE ONE PUFF BY MOUTH ONE TIME DAILY   rinse mouth after use    ZEMAIRA injection  Self Yes No   Sig: Once a week-    albuterol (2 5 mg/3 mL) 0 083 % nebulizer solution  Self Yes No   Sig: Inhale 1 each every 4 (four) hours as needed   albuterol (PROVENTIL HFA,VENTOLIN HFA) 90 mcg/act inhaler  Self No No   Sig: Inhale 2 puffs 4 (four) times a day   amLODIPine (NORVASC) 10 mg tablet 11/10/2019 at Unknown time Self No Yes   Sig: TAKE ONE TABLET BY MOUTH ONE TIME DAILY    bisacodyl (DULCOLAX) 5 mg EC tablet   No No   Sig: Take 2 tablets (10 mg total) by mouth once for 1 dose Per office instructions   cephalexin (KEFLEX) 500 mg capsule  Self No No   Sig: Take 1 capsule (500 mg total) by mouth every 12 (twelve) hours for 10 days   colestipol (COLESTID) 1 g tablet 11/10/2019 at 0900 Self No Yes   Sig: Take 1 tablet (1 g total) by mouth 2 (two) times a day   finasteride (PROSCAR) 5 mg tablet 11/10/2019 at 0900 Self Yes Yes   Sig: Take 5 mg by mouth every morning     fluticasone (FLONASE) 50 mcg/act nasal spray 11/10/2019 at Unknown time Self No Yes   Sig: USE TWO SPRAYS IN EACH NOSTRIL EVERY DAY    gabapentin (NEURONTIN) 300 mg capsule 11/10/2019 at 0900 Self No Yes   Sig: Take 1 capsule (300 mg total) by mouth 3 (three) times a day   ipratropium-albuterol (DUO-NEB) 0 5-2 5 mg/3 mL nebulizer solution  Self No No   Sig: Take 1 vial (3 mL total) by nebulization 4 (four) times a day   pantoprazole (PROTONIX) 40 mg tablet 11/11/2019 at 0600 Self No Yes   Sig: Take 1 tablet (40 mg total) by mouth 2 (two) times a day   polyethylene glycol (GOLYTELY) 4000 mL solution Unknown at Unknown time  No No   Sig: Take 4,000 mL by mouth once for 1 dose Drink over 3-4 hours starting at 4 PM the evening before your procedure   ranitidine (ZANTAC) 150 mg tablet Not Taking at Unknown time Self No No   Sig: Take 1 tablet (150 mg total) by mouth daily   Patient not taking: Reported on 11/11/2019   tamsulosin (FLOMAX) 0 4 mg 11/10/2019 at 0900 Self No Yes   Sig: TAKE ONE CAPSULE BY MOUTH ONE TIME DAILY    zolpidem (AMBIEN) 10 mg tablet 11/11/2019 at 0000 Self No Yes   Sig: TAKE ONE TABLET BY MOUTH NIGHTLY AT BEDTIME       Facility-Administered Medications: None       Past Medical History:   Diagnosis Date    Anesthesia complication     Difficult to wake up    Arthritis     BPH (benign prostatic hyperplasia)     urinary frequency    Cancer (HCC)     basal cell neck, face    COPD (chronic obstructive pulmonary disease) (HCC)     Full dentures     Hiatal hernia     History of methicillin resistant staphylococcus aureus (MRSA)     10/11/2018 MRSA (nares) positive    Hypertension     controlled    Irritable bowel syndrome     Kidney stone     at least 7 episodes    Liver disease     Alpha 1- enzyme deficiency - diagnosed 2002   has been on weekly replacement therapy since then    Pulmonary emphysema ([facility] [address] )     1/25/15  FEV1 - 2 45 liters or 59% of predicted    RSV infection 2017    Wears glasses     for driving only       Past Surgical History:   Procedure Laterality Date    BACK SURGERY  2008    discectomy    COLONOSCOPY      COLONOSCOPY N/A 3/10/2017    Procedure: Ebonie Giordano;  Surgeon: Vitaliy Roberts MD;  Location: [place] [address] GI LAB; Service:    Shyanne Mendez      removal of kidney stones    ESOPHAGOGASTRODUODENOSCOPY N/A 3/10/2017    Procedure: ESOPHAGOGASTRODUODENOSCOPY (EGD); Surgeon: Vitaliy Roberts MD;  Location: [facility]; Service:     LITHOTRIPSY      [place] ESOPHAGOGASTRODUODENOSCOPY TRANSORAL DIAGNOSTIC N/A 2018    Procedure: ESOPHAGOGASTRODUODENOSCOPY (EGD); Surgeon: Vitaliy Roberts MD;  Location: [facility]; Service: Gastroenterology    TONSILLECTOMY      VEIN LIGATION AND STRIPPING Bilateral     18's       Family History   Problem Relation Age of Onset    Emphysema Mother         never smoked    Emphysema Father     Cancer Brother         colon    Colon cancer Brother     Ulcerative colitis Family     Liver disease Family      I have reviewed and agree with the history as documented  Social History     Tobacco Use    Smoking status: Former Smoker     Packs/day: 1 00     Years: 60 00     Pack years: 60 00     Last attempt to quit: 2017     Years since quittin 1    Smokeless tobacco: Never Used    Tobacco comment: quit in 2017   Substance Use Topics    Alcohol use: Not Currently     Comment: stopped in     Drug use: No        Review of Systems   Constitutional: Negative  Negative for diaphoresis and fever  HENT: Negative  Respiratory: Positive for shortness of breath  Negative for cough and wheezing  Cardiovascular: Negative  Negative for chest pain, palpitations and leg swelling  Gastrointestinal: Negative for abdominal distention, abdominal pain, nausea and vomiting  Genitourinary: Negative  Musculoskeletal: Negative  Skin: Negative  Neurological: Negative  Psychiatric/Behavioral: Negative  All other systems reviewed and are negative  Physical Exam  Physical Exam   Constitutional: He is oriented to person, place, and time  He appears well-developed and well-nourished  No distress  HENT:   Head: Normocephalic and atraumatic  Nose: Nose normal    Mouth/Throat: Oropharynx is clear and moist    Eyes: Pupils are equal, round, and reactive to light  Conjunctivae and EOM are normal    Neck: Normal range of motion  Neck supple  Cardiovascular: Normal rate, regular rhythm and normal heart sounds  No murmur heard  Pulmonary/Chest: He is in respiratory distress  He has wheezes  He has no rales  Increased work of breathing  Tachypneic  Diminished breath sounds diffusely with wheezing   Abdominal: Soft  Bowel sounds are normal  He exhibits no distension  There is no tenderness  There is no rebound and no guarding  Musculoskeletal: Normal range of motion  He exhibits no edema, tenderness or deformity  Neurological: He is alert and oriented to person, place, and time  No cranial nerve deficit  Skin: Skin is warm and dry  No rash noted  He is not diaphoretic  No pallor  Psychiatric: He has a normal mood and affect  Vitals reviewed        Vital Signs  ED Triage Vitals   Temperature Pulse Respirations Blood Pressure SpO2   11/10/19 2016 11/10/19 2016 11/10/19 2016 11/10/19 2016 11/10/19 2016   97 8 °F (36 6 °C) 88 (!) 40 (!) 201/94 94 %      Temp Source Heart Rate Source Patient Position - Orthostatic VS BP Location FiO2 (%)   11/10/19 2016 11/10/19 2016 11/10/19 2320 11/10/19 2320 --   Tympanic Monitor Sitting Right arm       Pain Score       11/10/19 2016       No Pain           Vitals:    11/11/19 0410 11/11/19 0756 11/11/19 1100 11/11/19 1525   BP: 156/76 154/72 144/68 160/77   Pulse: 85 93 87 97   Patient Position - Orthostatic VS: Lying Lying  Sitting         Visual Acuity      ED Medications  Medications   amLODIPine (NORVASC) tablet 10 mg (10 mg Oral Given 11/11/19 0927)   colestipol (COLESTID) tablet 1 g (1 g Oral Not Given 11/11/19 1118)   finasteride (PROSCAR) tablet 5 mg (5 mg Oral Given 11/11/19 0927)   fluticasone (FLONASE) 50 mcg/act nasal spray 2 spray (2 sprays Each Nare Given 11/11/19 0927)   gabapentin (NEURONTIN) capsule 300 mg (300 mg Oral Given 11/11/19 0927)   pantoprazole (PROTONIX) EC tablet 40 mg (40 mg Oral Given 11/11/19 0508)   tamsulosin (FLOMAX) capsule 0 4 mg (0 4 mg Oral Given 11/11/19 0926)   zolpidem (AMBIEN) tablet 10 mg (10 mg Oral Given 11/11/19 0100)   famotidine (PEPCID) tablet 20 mg (20 mg Oral Given 11/11/19 0927)   enoxaparin (LOVENOX) subcutaneous injection 90 mg (90 mg Subcutaneous Given 11/11/19 1503)   ipratropium-albuterol (DUO-NEB) 0 5-2 5 mg/3 mL inhalation solution 3 mL (has no administration in time range)   acetylcysteine (MUCOMYST) 200 mg/mL inhalation solution 600 mg (3 mL Nebulization Given 11/11/19 1508)     And   ipratropium-albuterol (DUO-NEB) 0 5-2 5 mg/3 mL inhalation solution 3 mL (3 mL Nebulization Given 11/11/19 1508)   methylPREDNISolone sodium succinate (Solu-MEDROL) injection 60 mg (60 mg Intravenous Given 11/11/19 1459)   sodium chloride 0 9 % infusion (75 mL/hr Intravenous New Bag 11/11/19 0254)   aspirin (ECOTRIN LOW STRENGTH) EC tablet 81 mg (81 mg Oral Given 11/11/19 1123)   DOBUTamine (DOBUTREX) 500 mg in 250 mL infusion (premix) (2 5 mcg/kg/min × 87 4 kg Intravenous New Bag 11/11/19 1334)   magnesium sulfate 2 g/50 mL IVPB (premix) 2 g (0 g Intravenous Stopped 11/10/19 1410)   ipratropium-albuterol (DUO-NEB) 0 5-2 5 mg/3 mL inhalation solution 3 mL (3 mL Nebulization Given 11/10/19 2030)   methylPREDNISolone sodium succinate (Solu-MEDROL) injection 125 mg (125 mg Intravenous Given 11/10/19 2032)   albuterol inhalation solution 5 mg (5 mg Nebulization Given 11/10/19 2116)   ipratropium (ATROVENT) 0 02 % inhalation solution 0 5 mg (0 5 mg Nebulization Given 11/10/19 2116)   ipratropium-albuterol (DUO-NEB) 0 5-2 5 mg/3 mL inhalation solution 3 mL (3 mL Nebulization Given 11/10/19 2150)   aspirin tablet 325 mg (325 mg Oral Given 11/10/19 2314)       Diagnostic Studies  Results Reviewed     Procedure Component Value Units Date/Time    Troponin I [[id number]]  (Abnormal) Collected:  11/11/19 0309    Lab Status:  Final result Specimen:  Blood from Arm, Left Updated:  11/11/19 0345     Troponin I 0 07 ng/mL     Troponin I [[id number]]  (Abnormal) Collected:  11/10/19 2342    Lab Status:  Final result Specimen:  Blood from Arm, Right Updated:  11/11/19 0008     Troponin I 0 10 ng/mL     Basic metabolic panel [[id number]] Collected:  11/10/19 2032    Lab Status:  Final result Specimen:  Blood from Arm, Right Updated:  11/10/19 2103     Sodium 141 mmol/L      Potassium 3 9 mmol/L      Chloride 104 mmol/L      CO2 27 mmol/L      ANION GAP 10 mmol/L      BUN 12 mg/dL      Creatinine 1 29 mg/dL      Glucose 115 mg/dL      Calcium 9 5 mg/dL      eGFR 54 ml/min/1 73sq m     Narrative:       [facility] guidelines for Chronic Kidney Disease (CKD):     Stage 1 with normal or high GFR (GFR > 90 mL/min/1 73 square meters)    Stage 2 Mild CKD (GFR = 60-89 mL/min/1 73 square meters)    Stage 3A Moderate CKD (GFR = 45-59 mL/min/1 73 square meters)    Stage 3B Moderate CKD (GFR = 30-44 mL/min/1 73 square meters)    Stage 4 Severe CKD (GFR = 15-29 mL/min/1 73 square meters)    Stage 5 End Stage CKD (GFR <15 mL/min/1 73 square meters)  Note: GFR calculation is accurate only with a steady state creatinine    NT-BNP PRO [[id number]]  (Normal) Collected:  11/10/19 2032    Lab Status:  Final result Specimen:  Blood from Arm, Right Updated:  11/10/19 2103     NT-proBNP 170 pg/mL     Troponin I [[id number]]  (Abnormal) Collected:  11/10/19 2031    Lab Status:  Final result Specimen:  Blood from Arm, Right Updated:  11/10/19 2102     Troponin I 0 13 ng/mL     Protime-INR [[id number]]  (Normal) Collected:  11/10/19 2031    Lab Status:  Final result Specimen:  Blood from Arm, Right Updated:  11/10/19 2054     Protime 10 7 seconds      INR 0 99    APTT [[id number]]  (Normal) Collected:  11/10/19 2031    Lab Status:  Final result Specimen:  Blood from Arm, Right Updated:  11/10/19 2054     PTT 26 seconds     Blood gas, venous [[id number]]  (Abnormal) Collected:  11/10/19 2031    Lab Status:  Final result Specimen:  Blood from Arm, Right Updated:  11/10/19 2039     pH, Ralph 7 402     pCO2, Ralph 36 9 mm Hg      pO2, Ralph 54 2 mm Hg      HCO3, Ralph 22 4 mmol/L      Base Excess, Ralph -1 8 mmol/L      O2 Content, Ralph 19 8 ml/dL      O2 HGB, VENOUS 87 5 %     CBC and differential [[id number]] Collected:  11/10/19 2031    Lab Status:  Final result Specimen:  Blood from Arm, Right Updated:  11/10/19 2039     WBC 8 44 Thousand/uL      RBC 4 83 Million/uL      Hemoglobin 15 6 g/dL      Hematocrit 45 9 %      MCV 95 fL      MCH 32 3 pg      MCHC 34 0 g/dL      RDW 12 6 %      MPV 9 0 fL      Platelets 152 Thousands/uL      nRBC 0 /100 WBCs      Neutrophils Relative 57 %      Immat GRANS % 1 %      Lymphocytes Relative 30 %      Monocytes Relative 7 %      Eosinophils Relative 4 %      Basophils Relative 1 %      Neutrophils Absolute 4 89 Thousands/µL      Immature Grans Absolute 0 05 Thousand/uL      Lymphocytes Absolute 2 54 Thousands/µL      Monocytes Absolute 0 57 Thousand/µL      Eosinophils Absolute 0 30 Thousand/µL      Basophils Absolute 0 09 Thousands/µL                  XR chest 1 view portable   Final Result by Simona Pelaez MD (11/11 2699)      Right upper lobe nodular opacity perhaps inflammatory  Short-term surveillance recommended  COPD  The study was marked in [facility] for immediate notification        Workstation performed: [id number]                    Procedures  CriticalCare Time  Performed by: Ela Sapp MD  Authorized by: Ela Sapp MD     Critical care provider statement:     Critical care time (minutes):  50    Critical care was necessary to treat or prevent imminent or life-threatening deterioration of the following conditions:  Respiratory failure    Critical care was time spent personally by me on the following activities:  Blood draw for specimens, evaluation of patient's response to treatment, examination of patient, ventilator management, review of old charts, re-evaluation of patient's condition, ordering and review of radiographic studies and ordering and review of laboratory studies    I assumed direction of critical care for this patient from another provider in my specialty: no             ED Course  ED Course as of Nov 11 1633   Pamelia Mail Nov 10, 2019   2028 Patient increased work of breathing currently placed on BiPAP      2034 Bipap 12/6 30% oxygen       2100 Patient reevaluated and states feeling better  Patient lungs improved aeration  Will do another breathing treatment   Will anticipate admission       2102 Troponin I(!): 0 13   2120 Procedure Note: EKG  Date/Time: 11/10/19 9:20 PM   Performed by: Brian Presley by: Ronit Andres   Indications / Diagnosis: CP  ECG reviewed by me, the ED Provider: yes   The EKG demonstrates:  Rhythm: normal sinus  Intervals: normal intervals  Axis: normal axis  QRS/Blocks: normal QRS  ST Changes: No acute ST Changes, no STD/TEMI         2124 No st changes    Troponin I(!): 0 13   2147 Attempted take patient off of BiPAP, became tachypneic increased work of breathing place him back on BiPAP will speak with critical care                                  MDM    Disposition  Final diagnoses:   NSTEMI (non-ST elevated myocardial infarction) ([facility] [address] )   AAT (alpha-1-antitrypsin) deficiency ([facility] [address] )   Acute on chronic respiratory failure with hypoxia ([facility] [address] )     Time reflects when diagnosis was documented in both MDM as applicable and the Disposition within this note     Time User Action Codes Description Comment    11/11/2019  2:01 AM Damiah Anatoliy Add [I21 4] NSTEMI (non-ST elevated myocardial infarction) ([place] [address] )     11/11/2019  2:01 AM Damiah Dengo Modify [I21 4] NSTEMI (non-ST elevated myocardial infarction) ([facility] )     11/11/2019  8:08 AM Dorgisela Pacer Add [E88 01] AAT (alpha-1-antitrypsin) deficiency ([place] [address] )     11/11/2019  8:08 AM Dortha Pacer Add [J96 21] Acute on chronic respiratory failure with hypoxia [facility])       ED Disposition     None      Follow-up Information    None         Current Discharge Medication List      CONTINUE these medications which have NOT CHANGED    Details   amLODIPine (NORVASC) 10 mg tablet TAKE ONE TABLET BY MOUTH ONE TIME DAILY   Qty: 30 tablet, Refills: 4    Associated Diagnoses: Essential hypertension      colestipol (COLESTID) 1 g tablet Take 1 tablet (1 g total) by mouth 2 (two) times a day  Qty: 60 tablet, Refills: 5    Associated Diagnoses: Chronic diarrhea      finasteride (PROSCAR) 5 mg tablet Take 5 mg by mouth every morning        fluticasone (FLONASE) 50 mcg/act nasal spray USE TWO SPRAYS IN EACH NOSTRIL EVERY DAY   Qty: 16 g, Refills: 4    Associated Diagnoses: Rhinitis, unspecified type      gabapentin (NEURONTIN) 300 mg capsule Take 1 capsule (300 mg total) by mouth 3 (three) times a day  Qty: 90 capsule, Refills: 3    Associated Diagnoses: Neuropathy      pantoprazole (PROTONIX) 40 mg tablet Take 1 tablet (40 mg total) by mouth 2 (two) times a day  Qty: 60 tablet, Refills: 5    Associated Diagnoses: Gastroesophageal reflux disease without esophagitis      tamsulosin (FLOMAX) 0 4 mg TAKE ONE CAPSULE BY MOUTH ONE TIME DAILY   Qty: 30 capsule, Refills: 3    Associated Diagnoses: Benign prostatic hyperplasia with urinary frequency      TRELEGY ELLIPTA 100-62 5-25 MCG/INH inhaler INHALE ONE PUFF BY MOUTH ONE TIME DAILY   rinse mouth after use   Qty: 60 each, Refills: 4    Associated Diagnoses: Centrilobular emphysema (HCC)      zolpidem (AMBIEN) 10 mg tablet TAKE ONE TABLET BY MOUTH NIGHTLY AT BEDTIME Qty: 30 tablet, Refills: 5    Associated Diagnoses: Primary insomnia      albuterol (2 5 mg/3 mL) 0 083 % nebulizer solution Inhale 1 each every 4 (four) hours as needed      albuterol (PROVENTIL HFA,VENTOLIN HFA) 90 mcg/act inhaler Inhale 2 puffs 4 (four) times a day  Qty: 1 Inhaler, Refills: 5    Comments: Please substitute based on patient insurance  Associated Diagnoses: Chronic obstructive pulmonary disease, unspecified COPD type (HCC)      bisacodyl (DULCOLAX) 5 mg EC tablet Take 2 tablets (10 mg total) by mouth once for 1 dose Per office instructions  Qty: 2 tablet, Refills: 0    Associated Diagnoses: Chronic diarrhea      ipratropium-albuterol (DUO-NEB) 0 5-2 5 mg/3 mL nebulizer solution Take 1 vial (3 mL total) by nebulization 4 (four) times a day  Qty: 60 vial, Refills: 0    Associated Diagnoses: COPD exacerbation (HCC)      OXYGEN-HELIUM IN Inhale 2L at rest- 3L with activity      polyethylene glycol (GOLYTELY) 4000 mL solution Take 4,000 mL by mouth once for 1 dose Drink over 3-4 hours starting at 4 PM the evening before your procedure  Qty: 4000 mL, Refills: 0    Associated Diagnoses: Chronic diarrhea      ranitidine (ZANTAC) 150 mg tablet Take 1 tablet (150 mg total) by mouth daily  Qty: 30 tablet, Refills: 3    Associated Diagnoses: Gastroesophageal reflux disease without esophagitis      ZEMAIRA injection Once a week-          STOP taking these medications       cephalexin (KEFLEX) 500 mg capsule Comments:   Reason for Stopping:             No discharge procedures on file      ED Provider  Electronically Signed by           Jocelyn Manuel MD  11/11/19 0491

## 2019-11-11 NOTE — ASSESSMENT & PLAN NOTE
Patient hypertensive on admission with -200/80s  - Will continue home amlodipine 10mg PO daily at this time, may need to add an additional anti-hypertensive agent if patient's BP remains elevated  - patient's systolic -576 since 4 AM of the night of admission

## 2019-11-11 NOTE — ED NOTES
Repeat call to pharm to verify ASA, CFP resident at bedside to re eval aware of respiratory rate and BP trends   No additional orders received and this time will continue to monitor      Lonnie Galarza RN  11/10/19 4852

## 2019-11-11 NOTE — PLAN OF CARE
Problem: Potential for Falls  Goal: Patient will remain free of falls  Description  INTERVENTIONS:  - Assess patient frequently for physical needs  -  Identify cognitive and physical deficits and behaviors that affect risk of falls    -  [place] fall precautions as indicated by assessment   - Educate patient/family on patient safety including physical limitations  - Instruct patient to call for assistance with activity based on assessment  - Modify environment to reduce risk of injury  - Consider OT/PT consult to assist with strengthening/mobility  Outcome: Progressing     Problem: RESPIRATORY - ADULT  Goal: Achieves optimal ventilation and oxygenation  Description  INTERVENTIONS:  - Assess for changes in respiratory status  - Assess for changes in mentation and behavior  - Position to facilitate oxygenation and minimize respiratory effort  - Oxygen administered by appropriate delivery if ordered  - Assess and instruct to report SOB or any respiratory difficulty  - Respiratory Therapy support as indicated  Outcome: Progressing     Problem: GASTROINTESTINAL - ADULT  Goal: Maintains or returns to baseline bowel function  Description  INTERVENTIONS:  - Assess bowel function  - Encourage oral fluids to ensure adequate hydration  - Administer IV fluids if ordered to ensure adequate hydration (NS infusing at 75 cc/hr)  - Administer ordered medications as needed  - Encourage mobilization and activity  - Consider nutritional services referral to assist patient with adequate nutrition and appropriate food choices  Outcome: Progressing

## 2019-11-11 NOTE — ED NOTES
Went in to reassess patient and asked if he was having any chest pain  Pt denies but does complain of chest tightness but states that is from his breathing   Dr Jagdish Mitchell made aware     Aminta Carson, RN  11/10/19 2116

## 2019-11-11 NOTE — QUICK NOTE
Progress Note  Triage Assessment   Sasha Macdonald 75 y o  male MRN: [id number]    Time Called: 1320  Date Called: 11/10/19  Room#: ED 9  Time Evaluated: 56  Person requesting evaluation: Dr Sherly Srinivasan to ED to evaluate patient for possible admission to Critical Care Service, chart reviewed and patient evaluated  Patient was on bipap but appeared in no distress  Bipap removed during evaluation and placed on 3L NC  Patient had no conversational dyspnea and preferred to have bipap off  He states his breathing is slighlty worse then baseline but feels much better then when he came in  His lungs are clear but diminished at the bases  Case discussed with Critical Care attending Dr Russ Ortiz and after review it was felt the patient is appropriate for admission to a general medical floor  Discussed case with Dr Darlene Ahmadi and they have agreed to accept patient to their service  Also gave report to covering resident Dr Raul Myers  Recommendations discussed with ED attending Dr Angeles Muñoz who is now covering for Dr Matthew Polk          Triage Assessment:     Patient appropriate to be admitted to med-surg level of care      If any questions or concerns please call the critical care team at ext [phone]

## 2019-11-11 NOTE — H&P
H&P Exam - Erin Sethi 75 y o  male MRN: [id number]    Unit/Bed#: [address] [place] [address] Encounter: [id number]    Assessment: This is a 50-year-old male with a PMHx of chronic respiratory failure, HTN, severe centrilobular emphysema, alpha 1 antitrypsin deficiency, GERD, BPH, pulmonary hypertension, and venous ulcer of left lower extremity with varicose veins presenting with shortness of breath and NSTEMI  Patient will be admitted for observation under the service Dr Keesha Geronimo  Patient likely stay for less than two midnights, anticipated discharge to home/self-care  Plan:  Acute on chronic respiratory failure with hypoxia  Patient with a history of severe centrilobular emphysema and AAT deficiency (on weekly Zemaira) injections presented with tachypnea and increased work of breathing, initially placed on BiPAP in the ED   Patient was weaned off of BiPAP, saturating well on 3 L nasal cannula (baseline) with only mild conversational tachypnea and VBG collected was normal   - Received Solu-Medrol 125 mg IV x1 in ED, will continue with Solu-Medrol 60 mg IV q8h  - Duonebs q4h kenneth with Mucomyst, Duonebs q2h prn  - Supplemental O2 as needed to maintain SpO2 >89%, patient normally uses 2-3L at home  - Continuous pulse oximetry monitoring  - Consider pulmonology consults in AM    NSTEMI  Patient denies chest pain prior to admission, likely type 2 demand ischemia  - Troponin elevated at 0 13, will continue to trend  - EKG: normal sinus rhythm  - Telemetry monitoring  - Will administer ASA 325mg PO x1  - Will start therapeutic Lovenox 1mg/kg IV q12h  - Consult cardiology, appreciate recommendations    HTN  Patient hypertensive on admission with -200/80s  - Will continue home amlodipine 10mg PO daily at this time, may need to add an additional anti-hypertensive agent if patient's BP remains elevated    GERD  - Stable, continue home Protonix 40mg PO BID    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  - Stable, continue local wound care  - Follow up with vascular outpatient    [facility]  - NS 75cc/h  - Monitor and replete electrolytes as needed  - NPO  - DVT ppx: Lovenox 1mg/kg IV q12h      History of Present Illness   This is a 54-year-old male with a PMHx of chronic respiratory failure, HTN, severe centrilobular emphysema, alpha 1 antitrypsin deficiency, GERD, BPH, pulmonary hypertension, and venous ulcer of left lower extremity with varicose veins presenting with shortness of breath and NSTEMI  Patient states his shortness of breath started acutely this morning and continued to get worse throughout the day  Patient has been compliant with his home trilogy and albuterol, however did not get any relief with these  Patient denies any chest pain, however says his chest feels tight possibly due to chest congestion  He also states he has left lower quadrant pain which is being worked up by GI outpatient with a colonoscopy  Patient has had trouble scheduling a colonoscopy as his pulmonologist recommends performing the procedure at the hospital due to his breathing problems  ED course:  Mag sulfate 2 g x1, DuoNeb x3, Mucomyst x1, Solu-Medrol 125 mg IV x1, ASA 325mg PO x1    Review of Systems   Constitutional: Negative for activity change, fatigue and fever  HENT: Negative for congestion, ear pain, sneezing and sore throat  Respiratory: Positive for chest tightness and shortness of breath  Negative for cough and wheezing  Cardiovascular: Negative for chest pain  Gastrointestinal: Negative for abdominal pain, constipation, diarrhea, nausea and vomiting  Skin: Negative  Neurological: Negative for dizziness and headaches  Psychiatric/Behavioral: Negative for agitation  The patient is not nervous/anxious        Historical Information   Past Medical History:   Diagnosis Date    Anesthesia complication     Difficult to wake up    Arthritis     BPH (benign prostatic hyperplasia)     urinary frequency    Cancer ([facility] [address] )     basal cell neck, face    COPD (chronic obstructive pulmonary disease) (HCC)     Full dentures     Hiatal hernia     Hypertension     controlled    Irritable bowel syndrome     Kidney stone     at least 7 episodes    Liver disease     Alpha 1- enzyme deficiency - diagnosed   has been on weekly replacement therapy since then    Pulmonary emphysema ([facility] [address] )     1/25/15  FEV1 - 2 45 liters or 59% of predicted    RSV infection 2017    Wears glasses     for driving only     Past Surgical History:   Procedure Laterality Date    BACK SURGERY      discectomy    COLONOSCOPY      COLONOSCOPY N/A 3/10/2017    Procedure: Yin Fields;  Surgeon: Radha Degroot MD;  Location: [place] [id number] GI LAB; Service:    Jaspal Dumont      removal of kidney stones    ESOPHAGOGASTRODUODENOSCOPY N/A 3/10/2017    Procedure: ESOPHAGOGASTRODUODENOSCOPY (EGD); Surgeon: Radha Degroot MD;  Location: [facility] GI LAB; Service:     LITHOTRIPSY      VA ESOPHAGOGASTRODUODENOSCOPY TRANSORAL DIAGNOSTIC N/A 2018    Procedure: ESOPHAGOGASTRODUODENOSCOPY (EGD); Surgeon: Radha Degroot MD;  Location: [facility] GI LAB;   Service: Gastroenterology    TONSILLECTOMY      VEIN LIGATION AND STRIPPING Bilateral          Social History   Social History     Substance and Sexual Activity   Alcohol Use Not Currently    Comment: stopped in      Social History     Substance and Sexual Activity   Drug Use No     Social History     Tobacco Use   Smoking Status Former Smoker    Packs/day: 1 00    Years: 60 00    Pack years: 60 00    Last attempt to quit: 2017    Years since quittin 1   Smokeless Tobacco Never Used   Tobacco Comment    quit in 2017     Family History: non-contributory    Meds/Allergies   all medications and allergies reviewed  Allergies   Allergen Reactions    Chantix [Varenicline]      Caused prostate infection     Objective   First Vitals:   Blood Pressure: (!) 201/94 (11/10/19 2016)  Pulse: 88 (11/10/19 2016)  Temperature: 97 8 °F (36 6 °C) (11/10/19 2016)  Temp Source: Tympanic (11/10/19 2016)  Respirations: (!) 40 (11/10/19 2016)  Height: 6' 5" (195 6 cm) (11/10/19 2016)  Weight - Scale: 88 kg (194 lb) (11/10/19 2016)  SpO2: 94 % (11/10/19 2016)    Current Vitals:   Blood Pressure: (!) 172/79 (11/11/19 0043)  Pulse: 86 (11/11/19 0116)  Temperature: 97 8 °F (36 6 °C) (11/10/19 2016)  Temp Source: Tympanic (11/10/19 2016)  Respirations: 22 (11/11/19 0116)  Height: 6' 5" (195 6 cm) (11/11/19 0043)  Weight - Scale: 87 4 kg (192 lb 10 9 oz) (11/11/19 0043)  SpO2: 96 % (11/11/19 0116)    No intake or output data in the 24 hours ending 11/11/19 0152    Invasive Devices     Peripheral Intravenous Line            Peripheral IV (Ped) 11/10/19 Right Antecubital less than 1 day              Physical Exam   Constitutional: He is oriented to person, place, and time  He appears well-developed and well-nourished  No distress  HENT:   Head: Normocephalic and atraumatic  Right Ear: External ear normal    Left Ear: External ear normal    Mouth/Throat: Oropharynx is clear and moist  No oropharyngeal exudate  Eyes: Pupils are equal, round, and reactive to light  Conjunctivae are normal  Right eye exhibits no discharge  Left eye exhibits no discharge  No scleral icterus  Cardiovascular: Normal rate, regular rhythm and normal heart sounds  Pulmonary/Chest: No stridor  No respiratory distress  He has no wheezes  He has no rales  Poor respiratory effort   Abdominal: Soft  Bowel sounds are normal  He exhibits no distension and no mass  There is tenderness  There is no rebound and no guarding  Musculoskeletal: Normal range of motion  He exhibits edema (trace pitting bilaterally)  He exhibits no tenderness  Ulcer on  medial aspect left lower distal extremity, covered by compression stockings in place bilaterally   Neurological: He is alert and oriented to person, place, and time     Skin: Skin is warm and dry  No rash noted  He is not diaphoretic  No erythema  No pallor  Psychiatric: He has a normal mood and affect   His behavior is normal      Lab Results:   Results for orders placed or performed during the hospital encounter of 11/10/19   CBC and differential   Result Value Ref Range    WBC 8 44 4 31 - 10 16 Thousand/uL    RBC 4 83 3 88 - 5 62 Million/uL    Hemoglobin 15 6 12 0 - 17 0 g/dL    Hematocrit 45 9 36 5 - 49 3 %    MCV 95 82 - 98 fL    MCH 32 3 26 8 - 34 3 pg    MCHC 34 0 31 4 - 37 4 g/dL    RDW 12 6 11 6 - 15 1 %    MPV 9 0 8 9 - 12 7 fL    Platelets 366 780 - 141 Thousands/uL    nRBC 0 /100 WBCs    Neutrophils Relative 57 43 - 75 %    Immat GRANS % 1 0 - 2 %    Lymphocytes Relative 30 14 - 44 %    Monocytes Relative 7 4 - 12 %    Eosinophils Relative 4 0 - 6 %    Basophils Relative 1 0 - 1 %    Neutrophils Absolute 4 89 1 85 - 7 62 Thousands/µL    Immature Grans Absolute 0 05 0 00 - 0 20 Thousand/uL    Lymphocytes Absolute 2 54 0 60 - 4 47 Thousands/µL    Monocytes Absolute 0 57 0 17 - 1 22 Thousand/µL    Eosinophils Absolute 0 30 0 00 - 0 61 Thousand/µL    Basophils Absolute 0 09 0 00 - 0 10 Thousands/µL   Basic metabolic panel   Result Value Ref Range    Sodium 141 136 - 145 mmol/L    Potassium 3 9 3 5 - 5 3 mmol/L    Chloride 104 100 - 108 mmol/L    CO2 27 21 - 32 mmol/L    ANION GAP 10 4 - 13 mmol/L    BUN 12 5 - 25 mg/dL    Creatinine 1 29 0 60 - 1 30 mg/dL    Glucose 115 65 - 140 mg/dL    Calcium 9 5 8 3 - 10 1 mg/dL    eGFR 54 ml/min/1 73sq m   Troponin I   Result Value Ref Range    Troponin I 0 13 (H) <=0 04 ng/mL   NT-BNP PRO   Result Value Ref Range    NT-proBNP 170 <450 pg/mL   Protime-INR   Result Value Ref Range    Protime 10 7 9 8 - 12 0 seconds    INR 0 99 0 91 - 1 09   APTT   Result Value Ref Range    PTT 26 23 - 37 seconds   Blood gas, venous   Result Value Ref Range    pH, Ralph 7 402 (H) 7 300 - 7 400    pCO2, Ralph 36 9 (L) 42 0 - 50 0 mm Hg    pO2, Ralph 54 2 (H) 35 0 - 45 0 mm Hg    HCO3, Ralph 22 4 (L) 24 - 30 mmol/L    Base Excess, Ralph -1 8 mmol/L    O2 Content, Ralph 19 8 ml/dL    O2 HGB, VENOUS 87 5 (H) 60 0 - 80 0 %   Troponin I   Result Value Ref Range    Troponin I 0 10 (H) <=0 04 ng/mL       Imaging:   XR chest 1 view portable    (Results Pending)       EKG, Pathology, and Other Studies: NSR    Code Status: Level 1 - Full Code  Advance Directive and Living Will:      Power of :    POLST:      Counseling / Coordination of Care: Total floor / unit time spent today 42 minutes       Azalea Jacobson MD  11/11/2019  2:57 AM

## 2019-11-11 NOTE — CONSULTS
Consultation - Pulmonary Medicine  Severiano Lazier 75 y o  male MRN: [id number]  Unit/Bed#: [address] [place] [address] Encounter: [id number]    Assessment/Plan:  *NSTEMI  -Management per primary team and cardiology, troponin peak 0 13, on therapeutic lovenox     *Acute on chronic respiratory failure with hypoxia  -Required BiPAP in ED, able to be weaned back to baseline supplemental O2 requirement of 3-4L NC  -Currently saturating well 95-97% on 4L NC    *Centrilobular emphysema  -Received solumedrol 125mg IV in ED, now 60mg IV q8H  -Duonebs & Mycomyst q4H, Duoneb q2h prn   -Continue home Trelegy on discharge   -Most recent PFT 10/14/19: FVC 3 94L/72% predicted, FEV1 1 78L/44% predicted, obstruction ratio 45%, flow volume loop showed severe airway obstructive pattern     *Alpha 1 antitrypsin deficiency   -On weekly Zemaira therapy and supplemental O2 (3-4L baseline)     *Lung nodules   -Followed outpatient, CT chest with contrast 10/1/19 showed a new 4mm left upper lobe nodule with plan at that time to repeat CT chest in October 2020  -New finding on CXR at admission 11/10/19 of right upper lobe nodular opacity   -Repeat CXR outpatient 4-6 weeks to follow    Thank you for this consultation; we will be happy to follow with you     ______________________________________________________________________      History of Present Illness   Physician Requesting Consult: Cristóbal Jean Baptiste MD  Reason for Consult / Principal Problem: acute on chronic respiratory failure with hypoxia    HPI:  Severiano Lazier is a 75 y o  male with history of severe emphysema, chronic respiratory failure on home oxygen (3-4L), alpha-1 antitrypsin deficiency on replacement therapy who presented to [facility] 11/10 with complaint of shortness of breath  States he had been feeling unwell with worsening shortness of breath for a few days prior to that  Denied fevers, chills, cough, sick contacts  No changes in activity or exertion, no recent travel   In ED was found to have NSTEMI with troponin peak of 0 13  He was initially placed on BiPAP, given Duoneb treatments x3, mucomyst, solumedrol 125mg IV  In ED evaluated by critical care, but BiPAP was able to be removed with no conversational dyspnea  Respiratory status remained stable on supplemental O2 at 3L which is his baseline requirement  This morning he states he is feeling much better, saturating well on 4L NC, feels is breathing has significantly improved but has not quite returned to baseline from a few days prior  Review of Systems   Constitutional: Positive for fatigue  Negative for chills and fever  Respiratory: Positive for shortness of breath  Negative for cough, choking and wheezing  Cardiovascular: Negative for chest pain  Gastrointestinal: Negative for abdominal pain, nausea and vomiting  Past Medical/Surgical History  Past Medical History:   Diagnosis Date    Anesthesia complication     Difficult to wake up    Arthritis     BPH (benign prostatic hyperplasia)     urinary frequency    Cancer (HCC)     basal cell neck, face    COPD (chronic obstructive pulmonary disease) (HCC)     Full dentures     Hiatal hernia     History of methicillin resistant staphylococcus aureus (MRSA)     10/11/2018 MRSA (nares) positive    Hypertension     controlled    Irritable bowel syndrome     Kidney stone     at least 7 episodes    Liver disease     Alpha 1- enzyme deficiency - diagnosed 2002  has been on weekly replacement therapy since then    Pulmonary emphysema ([address] )     1/25/15  FEV1 - 2 45 liters or 59% of predicted    RSV infection 12/2017    Wears glasses     for driving only     Past Surgical History:   Procedure Laterality Date    BACK SURGERY  2008    discectomy    COLONOSCOPY      COLONOSCOPY N/A 3/10/2017    Procedure: Kasandra Nazario;  Surgeon: Philip Avelar MD;  Location: [place] [address] GI LAB;   Service:    Ariel Suarez      removal of kidney stones    ESOPHAGOGASTRODUODENOSCOPY N/A 3/10/2017 Procedure: ESOPHAGOGASTRODUODENOSCOPY (EGD); Surgeon: Ross Carr MD;  Location: [facility]; Service:     LITHOTRIPSY      OK ESOPHAGOGASTRODUODENOSCOPY TRANSORAL DIAGNOSTIC N/A 2018    Procedure: ESOPHAGOGASTRODUODENOSCOPY (EGD); Surgeon: Ross Carr MD;  Location: [facility] GI [facility];   Service: Gastroenterology    TONSILLECTOMY      VEIN LIGATION AND STRIPPING Bilateral            Social History  Social History     Substance and Sexual Activity   Alcohol Use Not Currently    Comment: stopped in        Social History     Substance and Sexual Activity   Drug Use No       Social History     Tobacco Use   Smoking Status Former Smoker    Packs/day: 1 00    Years: 60 00    Pack years: 60 00    Last attempt to quit: 2017    Years since quittin 1   Smokeless Tobacco Never Used   Tobacco Comment    quit in 2017       Family History  Family History   Problem Relation Age of Onset    Emphysema Mother         never smoked    Emphysema Father     Cancer Brother         colon    Colon cancer Brother     Ulcerative colitis Family     Liver disease Family        Allergies  Allergies   Allergen Reactions    Chantix [Varenicline]      Caused prostate infection       Home Meds:   Medications Prior to Admission   Medication Sig Dispense Refill Last Dose    amLODIPine (NORVASC) 10 mg tablet TAKE ONE TABLET BY MOUTH ONE TIME DAILY  30 tablet 4 11/10/2019 at Unknown time    colestipol (COLESTID) 1 g tablet Take 1 tablet (1 g total) by mouth 2 (two) times a day 60 tablet 5 11/10/2019 at 0900    finasteride (PROSCAR) 5 mg tablet Take 5 mg by mouth every morning     11/10/2019 at 0900    fluticasone (FLONASE) 50 mcg/act nasal spray USE TWO SPRAYS IN EACH NOSTRIL EVERY DAY  16 g 4 11/10/2019 at Unknown time    gabapentin (NEURONTIN) 300 mg capsule Take 1 capsule (300 mg total) by mouth 3 (three) times a day 90 capsule 3 11/10/2019 at 0900    pantoprazole (PROTONIX) 40 mg tablet Take 1 tablet (40 mg total) by mouth 2 (two) times a day 60 tablet 5 11/11/2019 at 0600    tamsulosin (FLOMAX) 0 4 mg TAKE ONE CAPSULE BY MOUTH ONE TIME DAILY  30 capsule 3 11/10/2019 at 0900    TRELEGY ELLIPTA 100-62 5-25 MCG/INH inhaler INHALE ONE PUFF BY MOUTH ONE TIME DAILY   rinse mouth after use  60 each 4 11/10/2019 at 0900    zolpidem (AMBIEN) 10 mg tablet TAKE ONE TABLET BY MOUTH NIGHTLY AT BEDTIME  30 tablet 5 11/11/2019 at 0000    albuterol (2 5 mg/3 mL) 0 083 % nebulizer solution Inhale 1 each every 4 (four) hours as needed   Taking    albuterol (PROVENTIL HFA,VENTOLIN HFA) 90 mcg/act inhaler Inhale 2 puffs 4 (four) times a day 1 Inhaler 5 Taking    bisacodyl (DULCOLAX) 5 mg EC tablet Take 2 tablets (10 mg total) by mouth once for 1 dose Per office instructions 2 tablet 0     ipratropium-albuterol (DUO-NEB) 0 5-2 5 mg/3 mL nebulizer solution Take 1 vial (3 mL total) by nebulization 4 (four) times a day 60 vial 0 Taking    OXYGEN-HELIUM IN Inhale 2L at rest- 3L with activity   Taking    polyethylene glycol (GOLYTELY) 4000 mL solution Take 4,000 mL by mouth once for 1 dose Drink over 3-4 hours starting at 4 PM the evening before your procedure 4000 mL 0 Unknown at Unknown time    ranitidine (ZANTAC) 150 mg tablet Take 1 tablet (150 mg total) by mouth daily (Patient not taking: Reported on 11/11/2019) 30 tablet 3 Not Taking at Unknown time    ZEMAIRA injection Once a week-    Taking     Current Meds:   Scheduled Meds:    Current Facility-Administered Medications:  acetylcysteine 3 mL Nebulization Q4H Cass Cummins MD    And        ipratropium-albuterol 3 mL Nebulization Q4H Caden Lamb MD    amLODIPine 10 mg Oral Daily Caden Lamb MD    colestipol 1 g Oral BID Caden Lamb MD    enoxaparin 1 mg/kg Subcutaneous Q12H Cass Cummins MD    famotidine 20 mg Oral Daily Caden Lamb MD    finasteride 5 mg Oral QAM Caden Lamb MD    fluticasone 2 spray Each Nare Daily Caden Lamb MD    gabapentin 300 mg Oral TID Jamir Carter MD    ipratropium-albuterol 3 mL Nebulization Q2H PRN Jamir Carter MD    methylPREDNISolone sodium succinate 60 mg Intravenous [facility] Jamir Carter MD    pantoprazole 40 mg Oral BID AC Jamir Carter MD    sodium chloride 75 mL/hr Intravenous Continuous Jamir Carter MD Last Rate: 75 mL/hr (11/11/19 0254)   tamsulosin 0 4 mg Oral Daily Jamir Carter MD    zolpidem 10 mg Oral HS Jamir Carter MD      PRN Meds:    ipratropium-albuterol 3 mL Q2H PRN       ____________________________________________________________________    Objective   Vitals:   Temp:  [97 8 °F (36 6 °C)-98 2 °F (36 8 °C)] 98 1 °F (36 7 °C)  HR:  [80-93] 93  Resp:  [19-40] 20  BP: (154-201)/(72-94) 154/72  Weight (last 2 days)     Date/Time   Weight    11/11/19 0043   87 4 (192 68)    11/10/19 2016   88 (194)            Oxygen Therapy  SpO2: 97 %  O2 Flow Rate (L/min): 3 5 L/min(L/min)    IV Infusions:     sodium chloride 75 mL/hr Last Rate: 75 mL/hr (11/11/19 0254)       Nutrition:        Diet Orders   (From admission, onward)             Start     Ordered    11/11/19 0904  Diet Cardiovascular; Cardiac; No Caffeine  Diet effective now     Comments:  Patient may have a light breakfast   Question Answer Comment   Diet Type Cardiovascular    Cardiac Cardiac    Other Restriction(s): No Caffeine    RD to adjust diet per protocol?  Yes        11/11/19 0905 11/11/19 0813  Room Service  Once     Question:  Type of Service  Answer:  Room Service - Appropriate with Assistance    11/11/19 0813                  Ins/Outs:   I/O       11/09 0701 - 11/10 0700 11/10 0701 - 11/11 0700 11/11 0701 - 11/12 0700    Urine (mL/kg/hr)  325 400 (2 9)    Total Output  325 400    Net  -325 -400                   Lines/Drains:  Invasive Devices     Peripheral Intravenous Line            Peripheral IV (Ped) 11/10/19 Right Antecubital less than 1 day                ____________________________________________________________________    Physical Exam Constitutional: He is oriented to person, place, and time  Non-toxic appearance  No distress  HENT:   Head: Normocephalic and atraumatic  Cardiovascular: Normal rate, regular rhythm and normal heart sounds  No murmur heard  Pulmonary/Chest: No respiratory distress  He has decreased breath sounds (bilaterally)  He has no wheezes  He has no rales  Neurological: He is alert and oriented to person, place, and time  Skin: Skin is warm and dry  Vitals reviewed  ____________________________________________________________________    Labs:   CBC:   Results from last 7 days   Lab Units 11/11/19  0309 11/10/19  2031   WBC Thousand/uL 7 23 8 44   HEMOGLOBIN g/dL 14 3 15 6   HEMATOCRIT % 42 3 45 9   MCV fL 95 95   PLATELETS Thousands/uL 180 206     CMP:   Results from last 7 days   Lab Units 11/11/19  0309 11/10/19  2032   POTASSIUM mmol/L 4 1 3 9   CHLORIDE mmol/L 103 104   CO2 mmol/L 24 27   BUN mg/dL 14 12   CREATININE mg/dL 1 35* 1 29   CALCIUM mg/dL 8 7 9 5   AST U/L 25  --    ALT U/L 37  --    ALK PHOS U/L 78  --    EGFR ml/min/1 73sq m 51 54     Magnesium:   Results from last 7 days   Lab Units 11/11/19  0309   MAGNESIUM mg/dL 2 1     Phosphorous:   Results from last 7 days   Lab Units 11/11/19  0309   PHOSPHORUS mg/dL 2 6     Troponin:   Results from last 7 days   Lab Units 11/11/19  0748 11/11/19  0309   TROPONIN I ng/mL 0 05* 0 07*     PT/INR:   Results from last 7 days   Lab Units 11/10/19  2031   PTT seconds 26   INR  0 99     Lactic Acid:     BNP:   Results from last 7 days   Lab Units 11/10/19  2032   NT-PRO BNP pg/mL 170     ABG:      Procalcitonin: Invalid input(s): PROCALCITONIN    Imaging:   Ct Chest With Contrast    Result Date: 10/2/2019  Narrative CT CHEST WITH IV CONTRAST INDICATION:   R91 8: Other nonspecific abnormal finding of lung field  COMPARISON:  06/30/2019, 10/11/2018 TECHNIQUE: CT examination of the chest was performed   Axial, sagittal, and coronal 2D reformatted images were created from the source data and submitted for interpretation  Radiation dose length product (DLP) for this visit:  392 mGy-cm   This examination, like all CT scans performed in the [facility], was performed utilizing techniques to minimize radiation dose exposure, including the use of iterative reconstruction and automated exposure control  IV Contrast:  100 mL of iohexol (OMNIPAQUE) FINDINGS: LUNGS:  Severe emphysematous changes are present  A bulla is seen at the right lung base  New 4 mm left upper lobe nodule, image 14 series 3  No additional new nodules  Chronic airspace disease in the right lower lobe  Stable soft tissue density at the bifurcation of the left lower lobe pulmonary artery  PLEURA:  No pleural effusion  Stable pleural-based calcifications on the right  HEART/GREAT VESSELS:  Unremarkable for patient's age  MEDIASTINUM AND MIKE:  Hiatal hernia  There is some thickening of the distal esophagus    CHEST WALL AND LOWER NECK:   Unremarkable  VISUALIZED STRUCTURES IN THE UPPER ABDOMEN:  There is fatty change in the liver  Cyst in the upper pole of the left kidney    OSSEOUS STRUCTURES:  No acute fracture or destructive osseous lesion  Impression 1  Severe emphysema  2   New 4 mm nodule in the left upper lobe  Recommend CT scan of the chest in 12 months time for follow-up in this high risk patient based on Fleischner Society guidelines  3   Stable soft tissue density at the bifurcation of the left lower lobe pulmonary artery  4   Hiatal hernia with stable thickening of the distal esophagus  This may be on the basis of reflux  The study was marked in EPIC for significant notification  Workstation performed: [id number]      Xr Chest Pa & Lateral    Result Date: 7/30/2019  Impression No acute cardiopulmonary disease  COPD  Workstation performed: [id number]     Xr Chest Pa & Lateral    Result Date: 7/23/2019  Impression No acute cardiopulmonary disease  Chronic changes  Workstation performed: [id number]     Xr Chest Pa & Lateral    Result Date: 7/2/2019  Impression Discoid atelectasis versus scarring right upper lobe  Workstation performed: [id number]        Micro:   Lab Results   Component Value Date    BLOODCX No Growth After 5 Days  12/29/2017    BLOODCX No Growth After 5 Days   12/29/2017    WOUNDCULT 3+ Growth of Staphylococcus aureus (A) 06/12/2019    MRSACULTURE  06/28/2019     No Methicillin Resistant Staphlyococcus aureus (MRSA) isolated        ____________________________________________________________________      Code Status: Level 1 - Full Code

## 2019-11-11 NOTE — ED NOTES
Report received ASA ordered noted, call placed to [facility] to verify order     Padmini Royal RN  11/10/19 1673

## 2019-11-12 LAB
ATRIAL RATE: 84 BPM
ATRIAL RATE: 87 BPM
ATRIAL RATE: 88 BPM
ATRIAL RATE: 89 BPM
CHEST PAIN STATEMENT: NORMAL
MAX DIASTOLIC BP: 81 MMHG
MAX HEART RATE: 139 BPM
MAX PREDICTED HEART RATE: 145 BPM
MAX. SYSTOLIC BP: 197 MMHG
MRSA NOSE QL CULT: NORMAL
P AXIS: -15 DEGREES
P AXIS: 23 DEGREES
P AXIS: 38 DEGREES
P AXIS: 8 DEGREES
PR INTERVAL: 136 MS
PR INTERVAL: 150 MS
PR INTERVAL: 198 MS
PR INTERVAL: 204 MS
PROTOCOL NAME: NORMAL
QRS AXIS: 30 DEGREES
QRS AXIS: 39 DEGREES
QRS AXIS: 5 DEGREES
QRS AXIS: 7 DEGREES
QRSD INTERVAL: 94 MS
QRSD INTERVAL: 94 MS
QRSD INTERVAL: 98 MS
QRSD INTERVAL: 98 MS
QT INTERVAL: 378 MS
QT INTERVAL: 380 MS
QT INTERVAL: 390 MS
QT INTERVAL: 404 MS
QTC INTERVAL: 459 MS
QTC INTERVAL: 459 MS
QTC INTERVAL: 460 MS
QTC INTERVAL: 486 MS
REASON FOR TERMINATION: NORMAL
T WAVE AXIS: 15 DEGREES
T WAVE AXIS: 28 DEGREES
T WAVE AXIS: 38 DEGREES
T WAVE AXIS: 8 DEGREES
TARGET HR FORMULA: NORMAL
TEST INDICATION: NORMAL
TIME IN EXERCISE PHASE: NORMAL
VENTRICULAR RATE: 84 BPM
VENTRICULAR RATE: 87 BPM
VENTRICULAR RATE: 88 BPM
VENTRICULAR RATE: 89 BPM

## 2019-11-12 PROCEDURE — 94640 AIRWAY INHALATION TREATMENT: CPT

## 2019-11-12 PROCEDURE — 99232 SBSQ HOSP IP/OBS MODERATE 35: CPT | Performed by: INTERNAL MEDICINE

## 2019-11-12 PROCEDURE — 99233 SBSQ HOSP IP/OBS HIGH 50: CPT | Performed by: FAMILY MEDICINE

## 2019-11-12 PROCEDURE — 94664 DEMO&/EVAL PT USE INHALER: CPT

## 2019-11-12 PROCEDURE — 93016 CV STRESS TEST SUPVJ ONLY: CPT | Performed by: INTERNAL MEDICINE

## 2019-11-12 PROCEDURE — 78452 HT MUSCLE IMAGE SPECT MULT: CPT | Performed by: INTERNAL MEDICINE

## 2019-11-12 PROCEDURE — 94760 N-INVAS EAR/PLS OXIMETRY 1: CPT

## 2019-11-12 PROCEDURE — 93010 ELECTROCARDIOGRAM REPORT: CPT | Performed by: INTERNAL MEDICINE

## 2019-11-12 PROCEDURE — 93018 CV STRESS TEST I&R ONLY: CPT | Performed by: INTERNAL MEDICINE

## 2019-11-12 RX ORDER — IPRATROPIUM BROMIDE AND ALBUTEROL SULFATE 2.5; .5 MG/3ML; MG/3ML
3 SOLUTION RESPIRATORY (INHALATION)
Status: DISCONTINUED | OUTPATIENT
Start: 2019-11-12 | End: 2019-11-15 | Stop reason: HOSPADM

## 2019-11-12 RX ORDER — FORMOTEROL FUMARATE 20 UG/2ML
20 SOLUTION RESPIRATORY (INHALATION)
Status: DISCONTINUED | OUTPATIENT
Start: 2019-11-12 | End: 2019-11-15 | Stop reason: HOSPADM

## 2019-11-12 RX ORDER — BUDESONIDE 0.5 MG/2ML
0.5 INHALANT ORAL
Status: DISCONTINUED | OUTPATIENT
Start: 2019-11-12 | End: 2019-11-15 | Stop reason: HOSPADM

## 2019-11-12 RX ADMIN — AMLODIPINE BESYLATE 10 MG: 10 TABLET ORAL at 08:48

## 2019-11-12 RX ADMIN — ACETYLCYSTEINE 200 MG: 200 SOLUTION ORAL; RESPIRATORY (INHALATION) at 07:31

## 2019-11-12 RX ADMIN — ACETYLCYSTEINE 600 MG: 200 SOLUTION ORAL; RESPIRATORY (INHALATION) at 03:00

## 2019-11-12 RX ADMIN — ZOLPIDEM TARTRATE 10 MG: 5 TABLET, COATED ORAL at 21:18

## 2019-11-12 RX ADMIN — GABAPENTIN 300 MG: 300 CAPSULE ORAL at 16:20

## 2019-11-12 RX ADMIN — IPRATROPIUM BROMIDE AND ALBUTEROL SULFATE 3 ML: 2.5; .5 SOLUTION RESPIRATORY (INHALATION) at 19:51

## 2019-11-12 RX ADMIN — ACETYLCYSTEINE 600 MG: 200 SOLUTION ORAL; RESPIRATORY (INHALATION) at 11:20

## 2019-11-12 RX ADMIN — IPRATROPIUM BROMIDE AND ALBUTEROL SULFATE 3 ML: 2.5; .5 SOLUTION RESPIRATORY (INHALATION) at 11:20

## 2019-11-12 RX ADMIN — IPRATROPIUM BROMIDE AND ALBUTEROL SULFATE 3 ML: 2.5; .5 SOLUTION RESPIRATORY (INHALATION) at 03:00

## 2019-11-12 RX ADMIN — BUDESONIDE 0.5 MG: 0.5 INHALANT RESPIRATORY (INHALATION) at 19:51

## 2019-11-12 RX ADMIN — PANTOPRAZOLE SODIUM 40 MG: 40 TABLET, DELAYED RELEASE ORAL at 06:11

## 2019-11-12 RX ADMIN — GABAPENTIN 300 MG: 300 CAPSULE ORAL at 21:18

## 2019-11-12 RX ADMIN — FAMOTIDINE 20 MG: 20 TABLET ORAL at 08:48

## 2019-11-12 RX ADMIN — GABAPENTIN 300 MG: 300 CAPSULE ORAL at 08:48

## 2019-11-12 RX ADMIN — METHYLPREDNISOLONE SODIUM SUCCINATE 40 MG: 40 INJECTION, POWDER, FOR SOLUTION INTRAMUSCULAR; INTRAVENOUS at 21:18

## 2019-11-12 RX ADMIN — FORMOTEROL FUMARATE DIHYDRATE 20 MCG: 20 SOLUTION RESPIRATORY (INHALATION) at 20:08

## 2019-11-12 RX ADMIN — ENOXAPARIN SODIUM 40 MG: 40 INJECTION SUBCUTANEOUS at 13:54

## 2019-11-12 RX ADMIN — FINASTERIDE 5 MG: 5 TABLET, FILM COATED ORAL at 08:47

## 2019-11-12 RX ADMIN — PANTOPRAZOLE SODIUM 40 MG: 40 TABLET, DELAYED RELEASE ORAL at 16:20

## 2019-11-12 RX ADMIN — TAMSULOSIN HYDROCHLORIDE 0.4 MG: 0.4 CAPSULE ORAL at 08:47

## 2019-11-12 RX ADMIN — ENOXAPARIN SODIUM 90 MG: 100 INJECTION SUBCUTANEOUS at 02:02

## 2019-11-12 RX ADMIN — METHYLPREDNISOLONE SODIUM SUCCINATE 40 MG: 40 INJECTION, POWDER, FOR SOLUTION INTRAMUSCULAR; INTRAVENOUS at 08:46

## 2019-11-12 RX ADMIN — IPRATROPIUM BROMIDE AND ALBUTEROL SULFATE 3 ML: 2.5; .5 SOLUTION RESPIRATORY (INHALATION) at 07:31

## 2019-11-12 RX ADMIN — ASPIRIN 81 MG: 81 TABLET, COATED ORAL at 08:47

## 2019-11-12 RX ADMIN — FLUTICASONE PROPIONATE 2 SPRAY: 50 SPRAY, METERED NASAL at 08:53

## 2019-11-12 NOTE — PROGRESS NOTES
Progress Note - Cardiology   [facility]     Shahbaz Moya 75 y o  male MRN: [id number]  : 1945  Unit/Bed#: [address] [place] [address] Encounter: [id number]    Assessment and Plan:   1  Mi type 2 secondary to demand of acute hypoxic respiratory failure/chronic emphysema:  Peak troponin was 0 13  Dobutamine nuclear stress images are pending  No further complaints of pain  2  Chronic emphysema with alpha-1 antitrypsin deficiency:  Patient followed by Pulmonary  Continue respiratory therapy and steroids as per Pulmonary team    3  Hypertension:  Blood pressure is controlled on current medications  Subjective / Objective:   Patient seen and examined  States last evening he became extremely short of breath walking from bed to bathroom, requiring the use of BiPAP last evening with improvement of symptoms  Patient states the took approximately 4 hours on BiPAP to feel better  He denied any chest pain, pressure or palpitations  Nuclear stress images are currently pending  Vitals: Blood pressure 136/78, pulse 73, temperature 98 8 °F (37 1 °C), temperature source Oral, resp  rate 20, height 6' 5" (1 956 m), weight 87 4 kg (192 lb 10 9 oz), SpO2 97 %  Vitals:    11/10/19 2016 11/11/19 0043   Weight: 88 kg (194 lb) 87 4 kg (192 lb 10 9 oz)     Body mass index is 22 85 kg/m²  BP Readings from Last 3 Encounters:   19 136/78   19 144/74   10/29/19 124/68     Orthostatic Blood Pressures      Most Recent Value   Blood Pressure  136/78 filed at 2019 0725   Patient Position - Orthostatic VS  Lying filed at 2019 0725        I/O       11/10 07 -  0700  07 -  0700  07 -  0700    P  O   840     I V  (mL/kg) 0 (0) 887 5 (10 2)     Total Intake(mL/kg) 0 (0) 1727 5 (19 8)     Urine (mL/kg/hr) 325 400 (0 2)     Total Output 325 400     Net -325 +1327 5            Unmeasured Urine Occurrence  1 x         Invasive Devices     Peripheral Intravenous Line Peripheral IV 11/11/19 Right Forearm less than 1 day                  Intake/Output Summary (Last 24 hours) at 11/12/2019 3201  Last data filed at 11/11/2019 1835  Gross per 24 hour   Intake 1727 5 ml   Output 400 ml   Net 1327 5 ml         Physical Exam:   Physical Exam   Constitutional: He is oriented to person, place, and time  He appears well-developed and well-nourished  No distress  HENT:   Head: Normocephalic and atraumatic  Right Ear: External ear normal    Left Ear: External ear normal    Eyes: Pupils are equal, round, and reactive to light  Conjunctivae are normal  Right eye exhibits no discharge  Left eye exhibits no discharge  No scleral icterus  Neck: Normal range of motion  Neck supple  No JVD present  No thyromegaly present  Cardiovascular: Normal rate, regular rhythm, normal heart sounds and intact distal pulses  Pulmonary/Chest: Effort normal  No accessory muscle usage  No respiratory distress  He has decreased breath sounds in the right upper field, the right middle field, the right lower field, the left upper field, the left middle field and the left lower field  He has no wheezes  He has no rhonchi  He has no rales  Abdominal: Soft  Bowel sounds are normal  He exhibits no distension  Musculoskeletal: He exhibits no edema  Neurological: He is alert and oriented to person, place, and time  Skin: Skin is warm and dry  Capillary refill takes less than 2 seconds  He is not diaphoretic  Psychiatric: He has a normal mood and affect  His behavior is normal  Judgment and thought content normal    Nursing note and vitals reviewed                  Medications/ Allergies:     Current Facility-Administered Medications:  acetylcysteine 3 mL Nebulization Q4H Cindy Suárez MD   And       ipratropium-albuterol 3 mL Nebulization Q4H Helio Robert MD   amLODIPine 10 mg Oral Daily Helio Robert MD   aspirin 81 mg Oral Daily TOPHER Hassan   colestipol 1 g Oral BID Helio Robert MD enoxaparin 40 mg Subcutaneous Q24H [address] Tino Abdullahi, DO   famotidine 20 mg Oral Daily Alyce Regalado MD   finasteride 5 mg Oral QAM Alyce Regalado MD   fluticasone 2 spray Each Nare Daily Alyce Regalado MD   gabapentin 300 mg Oral TID Alyce Regalado MD   ipratropium-albuterol 3 mL Nebulization Q2H PRN Alyce Regalado MD   methylPREDNISolone sodium succinate 40 mg Intravenous Q12H [address] Maria Victoria Verde,    pantoprazole 40 mg Oral BID AC Alyce Regalado MD   tamsulosin 0 4 mg Oral Daily Alyce Regalado MD   zolpidem 10 mg Oral HS Alyce Regalado MD       ipratropium-albuterol 3 mL Q2H PRN     Allergies   Allergen Reactions    Chantix [Varenicline]      Caused prostate infection       VTE Pharmacologic Prophylaxis:   Sequential compression device (Venodyne)     Labs:   Troponins:  Results from last 7 days   Lab Units 11/11/19  0748 11/11/19  0309 11/10/19  2342   TROPONIN I ng/mL 0 05* 0 07* 0 10*     CBC with diff:  Results from last 7 days   Lab Units 11/11/19  0309 11/10/19  2031   WBC Thousand/uL 7 23 8 44   HEMOGLOBIN g/dL 14 3 15 6   HEMATOCRIT % 42 3 45 9   MCV fL 95 95   PLATELETS Thousands/uL 180 206   MCH pg 32 1 32 3   MCHC g/dL 33 8 34 0   RDW % 12 5 12 6   MPV fL 8 8* 9 0   NRBC AUTO /100 WBCs 0 0     CMP:  Results from last 7 days   Lab Units 11/11/19  0309 11/10/19  2032   SODIUM mmol/L 139 141   POTASSIUM mmol/L 4 1 3 9   CHLORIDE mmol/L 103 104   CO2 mmol/L 24 27   ANION GAP mmol/L 12 10   BUN mg/dL 14 12   CREATININE mg/dL 1 35* 1 29   CALCIUM mg/dL 8 7 9 5   AST U/L 25  --    ALT U/L 37  --    ALK PHOS U/L 78  --    TOTAL PROTEIN g/dL 7 1  --    ALBUMIN g/dL 3 6  --    TOTAL BILIRUBIN mg/dL 0 60  --    EGFR ml/min/1 73sq m 51 54     Magnesium:  Results from last 7 days   Lab Units 11/11/19  0309   MAGNESIUM mg/dL 2 1     Coags:  Results from last 7 days   Lab Units 11/10/19  2031   PTT seconds 26   INR  0 99     Lipid Profile:  Results from last 7 days   Lab Units 11/11/19  0309   CHOLESTEROL mg/dL 167 TRIGLYCERIDES mg/dL 66   HDL mg/dL 40   LDL CALC mg/dL 114*     NT-proBNP:   Recent Labs     11/10/19  2032   NTBNP 170        Imaging & Testing   I have personally reviewed pertinent reports  Xr Chest 1 View Portable    Result Date: 2019  Narrative: CHEST INDICATION:   shortness of breath  COMPARISON:  2019; CT chest 10/1/2019 EXAM PERFORMED/VIEWS:  XR CHEST PORTABLE  AP semierect Images: 1 FINDINGS: Cardiomediastinal silhouette appears unremarkable  Lungs are mildly hyperinflated with no lobar consolidation or interstitial edema  A right upper lobe nodular opacity is of increased conspicuity on today's study projecting over the posterior 6th rib  This was not present on recent chest CT dated 10/1/2019 and may be inflammatory  Short-term surveillance recommended  No pleural effusions or pneumothorax  Osseous structures appear within normal limits for patient age  Impression: Right upper lobe nodular opacity perhaps inflammatory  Short-term surveillance recommended  COPD  The study was marked in [facility] for immediate notification   Workstation performed: [id number]       Cardiac testing:   Results for orders placed during the hospital encounter of 19   Echo complete with contrast if indicated    Narrative [address]  [address] [facility] [address]  [phone]    Transthoracic Echocardiogram  2D, M-mode, Doppler, and Color Doppler    Study date:  2019    Patient: Kathy Torrez  MR number: [id number]  Account number: [de-identified]  : 1945  Age: 75 years  Gender: Male  Status: Inpatient  Location: Bedside  Height: 77 in  Weight: 186 6 lb  BP: 124/ 72 mmHg    Indications: Dyspnea    Diagnoses: R06 00 - Dyspnea, unspecified    Sonographer:  JOSE Franco  Primary Physician:  Perry Bucrh MD  Referring Physician:  Fransisco Mclean PA-C  Group:  [facility]  Interpreting Physician:  Janet Barrera DO    SUMMARY    LEFT VENTRICLE:  Systolic function was normal by visual assessment  Ejection fraction was estimated to be 60 %  There were no regional wall motion abnormalities  There was moderate concentric hypertrophy  Doppler parameters were consistent with abnormal left ventricular relaxation (grade 1 diastolic dysfunction)  RIGHT VENTRICLE:  The ventricle was mildly dilated  RIGHT ATRIUM:  The atrium was mildly dilated  MITRAL VALVE:  There was mild regurgitation  AORTIC VALVE:  There was no evidence for stenosis  There was no regurgitation  TRICUSPID VALVE:  There was mild regurgitation  Pulmonary artery systolic pressure was mildly to moderately increased  PERICARDIUM:  A small pericardial effusion was identified  HISTORY: PRIOR HISTORY: HTN, COPD, Emphysema, IBS, Skin Cancer, BPH    PROCEDURE: The procedure was performed at the bedside  This was a routine study  The transthoracic approach was used  The study included complete 2D imaging, M-mode, complete spectral Doppler, and color Doppler  The heart rate was 73 bpm,  at the start of the study  Image quality was adequate  LEFT VENTRICLE: Size was normal  Systolic function was normal by visual assessment  Ejection fraction was estimated to be 60 %  There were no regional wall motion abnormalities  There was moderate concentric hypertrophy  DOPPLER: Doppler  parameters were consistent with abnormal left ventricular relaxation (grade 1 diastolic dysfunction)  RIGHT VENTRICLE: The ventricle was mildly dilated  Systolic function was normal     LEFT ATRIUM: The atrium was mildly dilated  No thrombus was identified  RIGHT ATRIUM: The atrium was mildly dilated  MITRAL VALVE: There was annular calcification  Valve structure was normal  There was normal leaflet separation  No echocardiographic evidence for prolapse  DOPPLER: The transmitral velocity was within the normal range  There was no  evidence for stenosis   There was mild regurgitation  AORTIC VALVE: The valve was trileaflet  Leaflets exhibited normal thickness, calcification, and normal cuspal separation  DOPPLER: Transaortic velocity was within the normal range  There was no evidence for stenosis  There was no  regurgitation  TRICUSPID VALVE: The valve structure was normal  There was normal leaflet separation  DOPPLER: The transtricuspid velocity was within the normal range  There was mild regurgitation  Pulmonary artery systolic pressure was mildly to  moderately increased  Estimated peak PA pressure was 51 mmHg  PULMONIC VALVE: Leaflets exhibited normal thickness, no calcification, and normal cuspal separation  DOPPLER: The transpulmonic velocity was within the normal range  There was no regurgitation  PERICARDIUM: There was no thickening  A small pericardial effusion was identified  AORTA: The root exhibited normal size and fibrocalcific change  PULMONARY ARTERY: The size was normal  The morphology appeared normal     SYSTEM MEASUREMENT TABLES    2D mode  AoR Diam 2D: 3 5 cm  LA Diam (2D): 4 cm  LA/Ao (2D): 1 14  FS (2D Teich): 32 6 %  IVSd (2D): 1 3 cm  LVDEV: 100 cmï¾³  LVESV: 39 1 cmï¾³  LVIDd(2D): 4 66 cm  LVISd (2D): 3 14 cm  LVPWd (2D): 1 27 cm  SV (Teich): 60 9 cmï¾³    Apical four chamber  LVEF A4C: 56 %    Unspecified Scan Mode  MV Peak A Myron: 888 mm/s  MV Peak E Myron  Mean: 775 mm/s  MVA (PHT): 3 86 cmï¾²  PHT: 57 ms  Max P mm[Hg]  V Max: 3270 mm/s  Vmax: 3270 mm/s  RA Area: 17 4 cmï¾²  RA Volume: 41 5 cmï¾³  TAPSE: 2 2 cm    [employer] Accredited Echocardi[facility]    Prepared and electronically signed by    Clayton Mckeon DO  Signed 2019 10:23:48         Abdirashid Thompson        "This note has been constructed using a voice recognition system  Therefore there may be syntax, spelling, and/or grammatical errors   Please call if you have any questions  "

## 2019-11-12 NOTE — PLAN OF CARE
Problem: Potential for Falls  Goal: Patient will remain free of falls  Description  INTERVENTIONS:  - Assess patient frequently for physical needs  -  Identify cognitive and physical deficits and behaviors that affect risk of falls    -  [place] fall precautions as indicated by assessment   - Educate patient/family on patient safety including physical limitations  - Instruct patient to call for assistance with activity based on assessment  - Modify environment to reduce risk of injury  - Consider OT/PT consult to assist with strengthening/mobility  Outcome: Progressing     Problem: RESPIRATORY - ADULT  Goal: Achieves optimal ventilation and oxygenation  Description  INTERVENTIONS:  - Assess for changes in respiratory status  - Assess for changes in mentation and behavior  - Position to facilitate oxygenation and minimize respiratory effort  - Oxygen administered by appropriate delivery if ordered  - Initiate smoking cessation education as indicated  - Encourage broncho-pulmonary hygiene including cough, deep breathe, Incentive Spirometry  - Assess the need for suctioning and aspirate as needed  - Assess and instruct to report SOB or any respiratory difficulty  - Respiratory Therapy support as indicated  Outcome: Progressing     Problem: GASTROINTESTINAL - ADULT  Goal: Maintains or returns to baseline bowel function  Description  INTERVENTIONS:  - Assess bowel function  - Encourage oral fluids to ensure adequate hydration  - Administer IV fluids if ordered to ensure adequate hydration  - Administer ordered medications as needed  - Encourage mobilization and activity  - Consider nutritional services referral to assist patient with adequate nutrition and appropriate food choices  Outcome: Progressing

## 2019-11-12 NOTE — PROGRESS NOTES
Progress Note - Pulmonary   Paul Members 75 y o  male MRN: [id number]  Unit/Bed#: [address] [place] [address] Encounter: [id number]      Assessment/Plan:     *NSTEMI  -Management per cardiology & primary team  Troponin peak at 0 13  Underwent dobutamine stress test 11/11, final read pending    *Acute on chronic respiratory failure with hypoxia  -Required BiPAP in the ED at time of admission and again overnight 11/11 after becoming short of breath with ambulation   -This morning comfortable and saturating well on 2L NC    *Centrilobular emphysema  -Received solumedrol 125mg IV in ED, decreased to 40mg q12h, continue to wean as tolerated   -Duonebs & Mucomyst q4h, Duoneb q2h prn  -Continue home Trelegy on discharge  -Most recent PFT 10/14/19: FVC 3 94L/72% predicted, FEV1 1 78L/44% predicted, obstruction ratio 45%, flow volume loop showed severe airway obstructive pattern     *Alpha 1 antitrypsin deficiency  -On weekly Zemaira therapy and supplemental O2 (3-4L baseline)    *Lung nodules  -Followed outpatient, CT chest with contrast 10/1/19 showed new 4mm left upper lobe nodule, plan at that time repeat CT chest in October 2020  -New finding on CXR at admission 11/10/19 of right upper lobe nodular opacity, possibly inflammatory  -Repeat CXR outpatient 4-6 weeks        ______________________________________________________________________    Subjective: Pt seen and examined at bedside this morning  States that overnight he became short of breath while walking to the bathroom and had to be placed back on BiPAP for about 4 hours, but felt much better afterwards  This morning states he continues to feel his breathing has improved  No chest pain or pressure  Complains of some cough and congestion but not bringing up anything       Vitals:   Temp:  [97 8 °F (36 6 °C)-98 8 °F (37 1 °C)] 98 8 °F (37 1 °C)  HR:  [73-97] 78  Resp:  [18-20] 20  BP: (122-160)/(59-78) 136/78  Weight (last 2 days)     Date/Time   Weight    11/11/19 0043   87 4 (192 68)    11/10/19 2016   88 (194)            Oxygen Therapy  SpO2: 95 %  O2 Flow Rate (L/min): 2 L/min    Nutrition:        Diet Orders   (From admission, onward)             Start     Ordered    11/11/19 1529  Diet Regular; Regular House  Diet effective now     Comments:  *If patient is having a stress test, no caffeine, decaf, or chocolate is to be given*   Question Answer Comment   Diet Type Regular    Regular Regular House    RD to adjust diet per protocol? Yes        11/11/19 1528    11/11/19 0813  Room Service  Once     Question:  Type of Service  Answer:  Room Service - Appropriate with Assistance    11/11/19 0813                Ins/Outs:   I/O       11/10 0701 - 11/11 0700 11/11 0701 - 11/12 0700 11/12 0701 - 11/13 0700    P  O   840     I V  (mL/kg) 0 (0) 887 5 (10 2)     Total Intake(mL/kg) 0 (0) 1727 5 (19 8)     Urine (mL/kg/hr) 325 400 (0 2)     Total Output 325 400     Net -325 +1327 5            Unmeasured Urine Occurrence  1 x           Lines/Drains:  Invasive Devices     Peripheral Intravenous Line            Peripheral IV 11/11/19 Right Forearm less than 1 day                 Active medications:  Scheduled Meds:  Current Facility-Administered Medications:  acetylcysteine 3 mL Nebulization Q4H [facility] Wesley Gross MD   And       ipratropium-albuterol 3 mL Nebulization Q4H Wesley Gross MD   amLODIPine 10 mg Oral Daily Wesley Gross MD   aspirin 81 mg Oral Daily TOPHER Rios   colestipol 1 g Oral BID Wesley Gross MD   enoxaparin 40 mg Subcutaneous Q24H [facility] Vasiliy Casey, DO   famotidine 20 mg Oral Daily Wesley Gross MD   finasteride 5 mg Oral QAM Wesley Gross MD   fluticasone 2 spray Each Nare Daily Wesley Gross MD   gabapentin 300 mg Oral TID Wesely Gross MD   ipratropium-albuterol 3 mL Nebulization Q2H PRN Wesley Gross MD   methylPREDNISolone sodium succinate 40 mg Intravenous Q12H [facility] Maria Victoria Verde, DO   pantoprazole 40 mg Oral BID AC Wesley Gross MD   tamsulosin 0 4 mg Oral Daily Nam Stewart MD   zolpidem 10 mg Oral HS Nam Stewart MD     PRN Meds:  ipratropium-albuterol 3 mL Q2H PRN     ____________________________________________________________________      Physical Exam   Constitutional: He is oriented to person, place, and time  No distress  HENT:   Head: Normocephalic and atraumatic  Cardiovascular: Normal rate, regular rhythm and normal heart sounds  No murmur heard  Pulmonary/Chest: No respiratory distress  He has decreased breath sounds (diminished bilaterally)  He has no wheezes  He has no rhonchi  He has no rales  Neurological: He is alert and oriented to person, place, and time  Skin: Skin is warm and dry  He is not diaphoretic  Vitals reviewed         ____________________________________________________________________    Labs:   CBC: Results from last 7 days   Lab Units 11/11/19  0309 11/10/19  2031   WBC Thousand/uL 7 23 8 44   HEMOGLOBIN g/dL 14 3 15 6   HEMATOCRIT % 42 3 45 9   MCV fL 95 95   PLATELETS Thousands/uL 180 206     CMP: Results from last 7 days   Lab Units 11/11/19  0309 11/10/19  2032   POTASSIUM mmol/L 4 1 3 9   CHLORIDE mmol/L 103 104   CO2 mmol/L 24 27   BUN mg/dL 14 12   CREATININE mg/dL 1 35* 1 29   CALCIUM mg/dL 8 7 9 5   AST U/L 25  --    ALT U/L 37  --    ALK PHOS U/L 78  --    EGFR ml/min/1 73sq m 51 54     No components found for: ABG    Magnesium:   Results from last 7 days   Lab Units 11/11/19  0309   MAGNESIUM mg/dL 2 1     Phosphorous:   Results from last 7 days   Lab Units 11/11/19  0309   PHOSPHORUS mg/dL 2 6     Troponin:   Results from last 7 days   Lab Units 11/11/19  0748 11/11/19  0309   TROPONIN I ng/mL 0 05* 0 07*     PT/INR:   Results from last 7 days   Lab Units 11/10/19  2031   PTT seconds 26   INR  0 99     Lactic Acid:     BNP:   Results from last 7 days   Lab Units 11/10/19  2032   NT-PRO BNP pg/mL 170     TSH:     Procalcitonin: Invalid input(s): PROCALCITONIN      Imaging:   Xr Chest Pa & Lateral    Result Date: 7/30/2019  Impression No acute cardiopulmonary disease  COPD  Workstation performed: [id number]     Xr Chest Pa & Lateral    Result Date: 7/23/2019  Impression No acute cardiopulmonary disease  Chronic changes  Workstation performed: [id number]     Xr Chest Pa & Lateral    Result Date: 7/2/2019  Impression Discoid atelectasis versus scarring right upper lobe  Workstation performed: [id number]      Ct Chest With Contrast    Result Date: 10/2/2019  Narrative CT CHEST WITH IV CONTRAST INDICATION:   R91 8: Other nonspecific abnormal finding of lung field  COMPARISON:  06/30/2019, 10/11/2018 TECHNIQUE: CT examination of the chest was performed  Axial, sagittal, and coronal 2D reformatted images were created from the source data and submitted for interpretation  Radiation dose length product (DLP) for this visit:  392 mGy-cm   This examination, like all CT scans performed in the [facility], was performed utilizing techniques to minimize radiation dose exposure, including the use of iterative reconstruction and automated exposure control  IV Contrast:  100 mL of iohexol (OMNIPAQUE) FINDINGS: LUNGS:  Severe emphysematous changes are present  A bulla is seen at the right lung base  New 4 mm left upper lobe nodule, image 14 series 3  No additional new nodules  Chronic airspace disease in the right lower lobe  Stable soft tissue density at the bifurcation of the left lower lobe pulmonary artery  PLEURA:  No pleural effusion  Stable pleural-based calcifications on the right  HEART/GREAT VESSELS:  Unremarkable for patient's age  MEDIASTINUM AND MIKE:  Hiatal hernia  There is some thickening of the distal esophagus    CHEST WALL AND LOWER NECK:   Unremarkable  VISUALIZED STRUCTURES IN THE UPPER ABDOMEN:  There is fatty change in the liver  Cyst in the upper pole of the left kidney    OSSEOUS STRUCTURES:  No acute fracture or destructive osseous lesion  Impression 1  Severe emphysema   2   New 4 mm nodule in the left upper lobe  Recommend CT scan of the chest in 12 months time for follow-up in this high risk patient based on Fleischner Society guidelines  3   Stable soft tissue density at the bifurcation of the left lower lobe pulmonary artery  4   Hiatal hernia with stable thickening of the distal esophagus  This may be on the basis of reflux  The study was marked in EPIC for significant notification  Workstation performed: [id number]          Micro: Lab Results   Component Value Date    BLOODCX No Growth After 5 Days  12/29/2017    BLOODCX No Growth After 5 Days   12/29/2017    WOUNDCULT 3+ Growth of Staphylococcus aureus (A) 06/12/2019    MRSACULTURE  06/28/2019     No Methicillin Resistant Staphlyococcus aureus (MRSA) isolated            Invalid input(s): LEGIONELLAURINARYANTIGEN        Code Status: Level 1 - Full Code

## 2019-11-13 LAB
ANION GAP SERPL CALCULATED.3IONS-SCNC: 9 MMOL/L (ref 4–13)
BUN SERPL-MCNC: 23 MG/DL (ref 5–25)
CALCIUM SERPL-MCNC: 9 MG/DL (ref 8.3–10.1)
CHLORIDE SERPL-SCNC: 102 MMOL/L (ref 100–108)
CO2 SERPL-SCNC: 26 MMOL/L (ref 21–32)
CREAT SERPL-MCNC: 1.05 MG/DL (ref 0.6–1.3)
GFR SERPL CREATININE-BSD FRML MDRD: 69 ML/MIN/1.73SQ M
GLUCOSE SERPL-MCNC: 110 MG/DL (ref 65–140)
POTASSIUM SERPL-SCNC: 4.3 MMOL/L (ref 3.5–5.3)
SODIUM SERPL-SCNC: 137 MMOL/L (ref 136–145)

## 2019-11-13 PROCEDURE — 94640 AIRWAY INHALATION TREATMENT: CPT

## 2019-11-13 PROCEDURE — 99232 SBSQ HOSP IP/OBS MODERATE 35: CPT | Performed by: FAMILY MEDICINE

## 2019-11-13 PROCEDURE — 99232 SBSQ HOSP IP/OBS MODERATE 35: CPT | Performed by: INTERNAL MEDICINE

## 2019-11-13 PROCEDURE — 80048 BASIC METABOLIC PNL TOTAL CA: CPT | Performed by: STUDENT IN AN ORGANIZED HEALTH CARE EDUCATION/TRAINING PROGRAM

## 2019-11-13 PROCEDURE — 94664 DEMO&/EVAL PT USE INHALER: CPT

## 2019-11-13 PROCEDURE — 94760 N-INVAS EAR/PLS OXIMETRY 1: CPT

## 2019-11-13 RX ADMIN — ASPIRIN 81 MG: 81 TABLET, COATED ORAL at 08:49

## 2019-11-13 RX ADMIN — METHYLPREDNISOLONE SODIUM SUCCINATE 40 MG: 40 INJECTION, POWDER, FOR SOLUTION INTRAMUSCULAR; INTRAVENOUS at 21:17

## 2019-11-13 RX ADMIN — PANTOPRAZOLE SODIUM 40 MG: 40 TABLET, DELAYED RELEASE ORAL at 05:47

## 2019-11-13 RX ADMIN — FORMOTEROL FUMARATE DIHYDRATE 20 MCG: 20 SOLUTION RESPIRATORY (INHALATION) at 07:37

## 2019-11-13 RX ADMIN — FLUTICASONE PROPIONATE 2 SPRAY: 50 SPRAY, METERED NASAL at 08:49

## 2019-11-13 RX ADMIN — ZOLPIDEM TARTRATE 10 MG: 5 TABLET, COATED ORAL at 21:17

## 2019-11-13 RX ADMIN — FAMOTIDINE 20 MG: 20 TABLET ORAL at 08:49

## 2019-11-13 RX ADMIN — AMLODIPINE BESYLATE 10 MG: 10 TABLET ORAL at 08:48

## 2019-11-13 RX ADMIN — GABAPENTIN 300 MG: 300 CAPSULE ORAL at 16:34

## 2019-11-13 RX ADMIN — FINASTERIDE 5 MG: 5 TABLET, FILM COATED ORAL at 08:49

## 2019-11-13 RX ADMIN — PANTOPRAZOLE SODIUM 40 MG: 40 TABLET, DELAYED RELEASE ORAL at 16:34

## 2019-11-13 RX ADMIN — METHYLPREDNISOLONE SODIUM SUCCINATE 40 MG: 40 INJECTION, POWDER, FOR SOLUTION INTRAMUSCULAR; INTRAVENOUS at 08:50

## 2019-11-13 RX ADMIN — ENOXAPARIN SODIUM 40 MG: 40 INJECTION SUBCUTANEOUS at 08:48

## 2019-11-13 RX ADMIN — IPRATROPIUM BROMIDE AND ALBUTEROL SULFATE 3 ML: 2.5; .5 SOLUTION RESPIRATORY (INHALATION) at 20:24

## 2019-11-13 RX ADMIN — TAMSULOSIN HYDROCHLORIDE 0.4 MG: 0.4 CAPSULE ORAL at 08:49

## 2019-11-13 RX ADMIN — IPRATROPIUM BROMIDE AND ALBUTEROL SULFATE 3 ML: 2.5; .5 SOLUTION RESPIRATORY (INHALATION) at 01:55

## 2019-11-13 RX ADMIN — GABAPENTIN 300 MG: 300 CAPSULE ORAL at 08:49

## 2019-11-13 RX ADMIN — IPRATROPIUM BROMIDE AND ALBUTEROL SULFATE 3 ML: 2.5; .5 SOLUTION RESPIRATORY (INHALATION) at 13:43

## 2019-11-13 RX ADMIN — GABAPENTIN 300 MG: 300 CAPSULE ORAL at 21:17

## 2019-11-13 RX ADMIN — IPRATROPIUM BROMIDE AND ALBUTEROL SULFATE 3 ML: 2.5; .5 SOLUTION RESPIRATORY (INHALATION) at 07:22

## 2019-11-13 RX ADMIN — BUDESONIDE 0.5 MG: 0.5 INHALANT RESPIRATORY (INHALATION) at 20:24

## 2019-11-13 RX ADMIN — BUDESONIDE 0.5 MG: 0.5 INHALANT RESPIRATORY (INHALATION) at 07:22

## 2019-11-13 RX ADMIN — FORMOTEROL FUMARATE DIHYDRATE 20 MCG: 20 SOLUTION RESPIRATORY (INHALATION) at 20:24

## 2019-11-13 NOTE — PROGRESS NOTES
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 presented with tachypnea and increased work of breathing, initially placed on BiPAP in the ED  Patient was weaned off of BiPAP, saturating well on 3 L nasal cannula (baseline) with only mild conversational tachypnea and VBG collected was normal   - Received Solu-Medrol 125 mg IV x1 in ED, will continue with Solu-Medrol 60 mg IV q8h  - Duonebs q4h kenneth with Mucomyst, Duonebs q2h prn  - Supplemental O2 as needed to maintain SpO2 >89%, patient normally uses 2-3L at home  - Continuous pulse oximetry monitoring  - Pulm consulted - recommends weaning patient off of 40 mg BID and DuoNebs Q6H as tolerated; discharge with Trelegy outpatient; f/u on new RUL nodules on CXR in 4-6 weeks    NSTEMI (non-ST elevated myocardial infarction) [facility])  Assessment & Plan  Patient denies chest pain prior to admission, likely type 2 demand ischemia  - Troponin elevated at 0 13, continued to downtrend in subsequent 3 readings   - EKG: normal sinus rhythm  - Telemetry monitoring  - Will administer ASA 325mg PO x1  - Will start therapeutic Lovenox 1mg/kg IV q12h   Patient was transitioned to prophylactic Lovenox after stress test ruled out myocardial ischemia with LVEF of 75%  - Consult cardiology, appreciate recommendations    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 (HCC)  Assessment & Plan  - Stable, continue local wound care  - Follow up with vascular outpatient    Essential hypertension  Assessment & Plan  Patient hypertensive on admission with -200/80s  - Will continue home amlodipine 10mg PO daily at this time, may need to add an additional anti-hypertensive agent if patient's BP remains elevated  - patient's systolic -911 since 4 AM of the night of admission (40 hrs ago)  Gastroesophageal reflux disease  Assessment & Plan  - Stable, continue home Protonix 40mg PO BID    F - encourage oral hydration  E - monitor and correct as necessary  N - regular  D - SQL & SCD      Subjective:   Patient was seen at bedside  Overnight patient became short of breath after ambulating to the bathroom, and required 4 hours on BiPAP  This morning patient is comfortable on 2 L nasal cannula  Patient is currently on Solu-Medrol 40 mg IV q 12h  Objective:     Vitals: Blood pressure 157/77, pulse 80, temperature 98 7 °F (37 1 °C), temperature source Oral, resp  rate 17, height 6' 5" (1 956 m), weight 87 4 kg (192 lb 10 9 oz), SpO2 95 %  ,Body mass index is 22 85 kg/m²  Wt Readings from Last 3 Encounters:   11/11/19 87 4 kg (192 lb 10 9 oz)   11/08/19 88 kg (194 lb)   10/29/19 88 6 kg (195 lb 7 oz)       Intake/Output Summary (Last 24 hours) at 11/12/2019 2104  Last data filed at 11/12/2019 1825  Gross per 24 hour   Intake 720 ml   Output 325 ml   Net 395 ml       Physical Exam:   Physical Exam   Constitutional: He is oriented to person, place, and time  No distress  Cardiovascular: Normal rate and regular rhythm  Pulmonary/Chest: No respiratory distress  He has decreased breath sounds (L worse than the R)  Neurological: He is alert and oriented to person, place, and time  Skin: Skin is warm  He is not diaphoretic  Psychiatric: He has a normal mood and affect  His behavior is normal    Vitals reviewed  No results found for this or any previous visit (from the past 24 hour(s))      Current Facility-Administered Medications   Medication Dose Route Frequency Provider Last Rate Last Dose    amLODIPine (NORVASC) tablet 10 mg  10 mg Oral Daily Yolis Leon MD   10 mg at 11/12/19 0848    aspirin (ECOTRIN LOW STRENGTH) EC tablet 81 mg  81 mg Oral Daily TOPHER Feng   81 mg at 11/12/19 0847    budesonide (PULMICORT) inhalation solution 0 5 mg  0 5 mg Nebulization Q12H Vickie Paz PA-C   0 5 mg at 11/12/19 1951    colestipol (COLESTID) tablet 1 g  1 g Oral BID Nam Stewart MD        enoxaparin (LOVENOX) subcutaneous injection 40 mg  40 mg Subcutaneous Q24H [address] Clarisa Tran, DO   40 mg at 11/12/19 1354    famotidine (PEPCID) tablet 20 mg  20 mg Oral Daily Nam Stewart MD   20 mg at 11/12/19 0848    finasteride (PROSCAR) tablet 5 mg  5 mg Oral QAM Nam Stewart MD   5 mg at 11/12/19 0847    fluticasone (FLONASE) 50 mcg/act nasal spray 2 spray  2 spray Each Nare Daily Nam Stewart MD   2 spray at 11/12/19 0853    formoterol (PERFOROMIST) nebulizer solution 20 mcg  20 mcg Nebulization Q12H Vickie Paz PA-C   20 mcg at 11/12/19 2008    gabapentin (NEURONTIN) capsule 300 mg  300 mg Oral TID Nam Stewart MD   300 mg at 11/12/19 1620    ipratropium-albuterol (DUO-NEB) 0 5-2 5 mg/3 mL inhalation solution 3 mL  3 mL Nebulization Q2H PRN Nam Stewart MD   3 mL at 11/11/19 1844    ipratropium-albuterol (DUO-NEB) 0 5-2 5 mg/3 mL inhalation solution 3 mL  3 mL Nebulization Q6H Maria Victoria Verde DO   3 mL at 11/12/19 1951    methylPREDNISolone sodium succinate (Solu-MEDROL) injection 40 mg  40 mg Intravenous Q12H [address] Maria Victoria Verde DO   40 mg at 11/12/19 0846    pantoprazole (PROTONIX) EC tablet 40 mg  40 mg Oral BID AC Nam Stewart MD   40 mg at 11/12/19 1620    tamsulosin (FLOMAX) capsule 0 4 mg  0 4 mg Oral Daily Nam Stewart MD   0 4 mg at 11/12/19 0847    zolpidem (AMBIEN) tablet 10 mg  10 mg Oral HS Nam Stewart MD   10 mg at 11/11/19 2200       Invasive Devices     Peripheral Intravenous Line            Peripheral IV 11/11/19 Right Forearm 1 day                Lab, Imaging and other studies: I have personally reviewed pertinent reports      VTE Pharmacologic Prophylaxis: Enoxaparin (Lovenox)  VTE Mechanical Prophylaxis: sequential compression device    [facility] Clara Patel MD

## 2019-11-13 NOTE — PLAN OF CARE
Problem: Potential for Falls  Goal: Patient will remain free of falls  Description  INTERVENTIONS:  - Assess patient frequently for physical needs  -  Identify cognitive and physical deficits and behaviors that affect risk of falls    -  [place] fall precautions as indicated by assessment   - Educate patient/family on patient safety including physical limitations  - Instruct patient to call for assistance with activity based on assessment  - Modify environment to reduce risk of injury  - Consider OT/PT consult to assist with strengthening/mobility  Outcome: Progressing     Problem: RESPIRATORY - ADULT  Goal: Achieves optimal ventilation and oxygenation  Description  INTERVENTIONS:  - Assess for changes in respiratory status  - Assess for changes in mentation and behavior  - Position to facilitate oxygenation and minimize respiratory effort  - Oxygen administered by appropriate delivery if ordered  - Initiate smoking cessation education as indicated  - Encourage broncho-pulmonary hygiene including cough, deep breathe, Incentive Spirometry  - Assess the need for suctioning and aspirate as needed  - Assess and instruct to report SOB or any respiratory difficulty  - Respiratory Therapy support as indicated  Outcome: Progressing     Problem: GASTROINTESTINAL - ADULT  Goal: Maintains or returns to baseline bowel function  Description  INTERVENTIONS:  - Assess bowel function  - Encourage oral fluids to ensure adequate hydration  - Administer IV fluids if ordered to ensure adequate hydration  - Administer ordered medications as needed  - Encourage mobilization and activity  - Consider nutritional services referral to assist patient with adequate nutrition and appropriate food choices  Outcome: Progressing

## 2019-11-13 NOTE — PLAN OF CARE
Problem: Potential for Falls  Goal: Patient will remain free of falls  Description  INTERVENTIONS:  - Assess patient frequently for physical needs  -  Identify cognitive and physical deficits and behaviors that affect risk of falls    -  [place] fall precautions as indicated by assessment   - Educate patient/family on patient safety including physical limitations  - Instruct patient to call for assistance with activity based on assessment  - Modify environment to reduce risk of injury  - Consider OT/PT consult to assist with strengthening/mobility  Outcome: Progressing     Problem: RESPIRATORY - ADULT  Goal: Achieves optimal ventilation and oxygenation  Description  INTERVENTIONS:  - Assess for changes in respiratory status  - Assess for changes in mentation and behavior  - Position to facilitate oxygenation and minimize respiratory effort  - Oxygen administered by appropriate delivery if ordered  - Initiate smoking cessation education as indicated  - Encourage broncho-pulmonary hygiene including cough, deep breathe, Incentive Spirometry  - Assess the need for suctioning and aspirate as needed  - Assess and instruct to report SOB or any respiratory difficulty  - Respiratory Therapy support as indicated  Outcome: Progressing     Problem: GASTROINTESTINAL - ADULT  Goal: Maintains or returns to baseline bowel function  Description  INTERVENTIONS:  - Assess bowel function  - Encourage oral fluids to ensure adequate hydration  - Administer IV fluids if ordered to ensure adequate hydration  - Administer ordered medications as needed  - Encourage mobilization and activity  - Consider nutritional services referral to assist patient with adequate nutrition and appropriate food choices  Outcome: Progressing

## 2019-11-13 NOTE — PROGRESS NOTES
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:  -Ratio 45% / FEV1 44%   -is normally on weekly replacement of alpha 1 proteinase inhibitor  -transitioned to peroformist / Phyliss Bathe 11/12 > this will be a permanent change  -remains on solumedrol 40 mg Q 12  -continues w/ wheezing w/ cough  -duoneb Q 6 prn     Acute on Chronic Hypoxemic Respiratory Failure  -stabilized  -chronically on 2-3 L NC /   -recurrent dyspnea on ambulation  -will order oxymizer for patient   Lung Nodules     Recurrent Diarrhea:  -pt was scheduled for colonoscopy Monday  -no obvious bleeding  -primary team checking FOBT     Pulmonary Hypertension:  -likely both group 2,3 in setting of severe emphysematous COPD and / chronic diastolic dysfunction per echo 7/2019  -PAP 51 MMHG  -consideration for R/LHC this admission     Pulmonary Nodules:  -new RUL nodule per CXR finding on 11/10  -Hx of CAROLYN nodule on most recent CT October 2019              ______________________________________________________________________     Subjective: Pt seen and examined at bedside  Still complains of ambulatory dsypnea  He himself reports that he maintains 94% w/ ambulation  He reports sometimes decreases to 89% but recovers quickly and dyspnea dissipates quickly as well  Tele Events:     Vitals:   Temp:  [97 2 °F (36 2 °C)-98 7 °F (37 1 °C)] 97 2 °F (36 2 °C)  HR:  [75-84] 75  Resp:  [17-20] 18  BP: (126-157)/(64-77) 146/74  Weight (last 2 days)     Date/Time   Weight    11/11/19 0043   87 4 (192 68)            Oxygen Therapy  SpO2: 99 %  O2 Flow Rate (L/min): 2 L/min        IV Infusions:       Nutrition:        Diet Orders   (From admission, onward) Start     Ordered    11/11/19 1529  Diet Regular; Regular House  Diet effective now     Comments:  *If patient is having a stress test, no caffeine, decaf, or chocolate is to be given*   Question Answer Comment   Diet Type Regular    Regular Regular House    RD to adjust diet per protocol? Yes        11/11/19 1528    11/11/19 0813  Room Service  Once     Question:  Type of Service  Answer:  Room Service - Appropriate with Assistance    11/11/19 0813                Ins/Outs:   I/O       11/11 0701 - 11/12 0700 11/12 0701 - 11/13 0700 11/13 0701 - 11/14 0700    P  O  840 720 240    I V  (mL/kg) 887 5 (10 2)      Total Intake(mL/kg) 1727 5 (19 8) 720 (8 2) 240 (2 7)    Urine (mL/kg/hr) 400 (0 2) 1300 (0 6) 200 (0 7)    Stool  0     Total Output 400 1300 200    Net +1327 5 -580 +40           Unmeasured Urine Occurrence 1 x      Unmeasured Stool Occurrence  1 x           Lines/Drains:  Invasive Devices     Peripheral Intravenous Line            Peripheral IV 11/11/19 Right Forearm 1 day                 Active medications:  Scheduled Meds:  Current Facility-Administered Medications:  amLODIPine 10 mg Oral Daily Kathryn Jay MD   aspirin 81 mg Oral Daily TOPHER Samuels   budesonide 0 5 mg Nebulization Q12H Juan Gamez PA-C   colestipol 1 g Oral BID Kathryn Jay MD   enoxaparin 40 mg Subcutaneous Q24H [facility],    famotidine 20 mg Oral Daily Kathryn Jay MD   finasteride 5 mg Oral QAM Kathryn Jay MD   fluticasone 2 spray Each Nare Daily Kathryn Jay MD   formoterol 20 mcg Nebulization Q12H Juan Gamez PA-C   gabapentin 300 mg Oral TID Kathryn Jay MD   ipratropium-albuterol 3 mL Nebulization Q2H PRN Kathryn Jay MD   ipratropium-albuterol 3 mL Nebulization Q6H Maria Victoria Verde DO   methylPREDNISolone sodium succinate 40 mg Intravenous Q12H [facility] Maria Victoria Verde DO   pantoprazole 40 mg Oral BID AC Kathryn Jay MD   tamsulosin 0 4 mg Oral Daily Kathryn Jay MD   zolpidem 10 mg Oral HS Blanche Gottlieb MD     PRN Meds:  ipratropium-albuterol 3 mL Q2H PRN     ____________________________________________________________________      Physical Exam   Constitutional: He is oriented to person, place, and time  He appears well-developed  Elderly, frail  Chronically ill appearing   HENT:   Head: Normocephalic and atraumatic  Eyes: Pupils are equal, round, and reactive to light  Conjunctivae are normal    Neck: Normal range of motion  Neck supple  Cardiovascular: Normal rate, regular rhythm and normal heart sounds  Exam reveals no gallop and no friction rub  No murmur heard  Pulmonary/Chest: Effort normal  No accessory muscle usage  No tachypnea  No respiratory distress  He has decreased breath sounds in the right upper field, the right middle field, the right lower field, the left upper field, the left middle field and the left lower field  He has wheezes in the right upper field, the right middle field, the right lower field, the left upper field, the left middle field and the left lower field  He has no rhonchi  He has no rales  He exhibits no tenderness  Expiratory wheezes on coughing >    Abdominal: Soft  Bowel sounds are normal    Musculoskeletal: Normal range of motion  He exhibits no edema  Neurological: He is alert and oriented to person, place, and time  Skin: Skin is warm and dry     Psychiatric: He has a normal mood and affect            ____________________________________________________________________    Labs:   CBC: Results from last 7 days   Lab Units 11/11/19  0309 11/10/19  2031   WBC Thousand/uL 7 23 8 44   HEMOGLOBIN g/dL 14 3 15 6   HEMATOCRIT % 42 3 45 9   MCV fL 95 95   PLATELETS Thousands/uL 180 206     CMP: Results from last 7 days   Lab Units 11/11/19  0309 11/10/19  2032   POTASSIUM mmol/L 4 1 3 9   CHLORIDE mmol/L 103 104   CO2 mmol/L 24 27   BUN mg/dL 14 12   CREATININE mg/dL 1 35* 1 29   CALCIUM mg/dL 8 7 9 5   AST U/L 25  --    ALT U/L 37  --    ALK PHOS U/L 78 --    EGFR ml/min/1 73sq m 51 54     No components found for: ABG    Magnesium:   Results from last 7 days   Lab Units 11/11/19  0309   MAGNESIUM mg/dL 2 1     Phosphorous:   Results from last 7 days   Lab Units 11/11/19  0309   PHOSPHORUS mg/dL 2 6     Troponin:   Results from last 7 days   Lab Units 11/11/19  0748 11/11/19  0309   TROPONIN I ng/mL 0 05* 0 07*     PT/INR:   Results from last 7 days   Lab Units 11/10/19  2031   PTT seconds 26   INR  0 99     Lactic Acid:     BNP:   Results from last 7 days   Lab Units 11/10/19  2032   NT-PRO BNP pg/mL 170     TSH:     Procalcitonin: Invalid input(s): PROCALCITONIN      Imaging:   Xr Chest Pa & Lateral    Result Date: 7/30/2019  Impression No acute cardiopulmonary disease  COPD  Workstation performed: [id number]     Xr Chest Pa & Lateral    Result Date: 7/23/2019  Impression No acute cardiopulmonary disease  Chronic changes  Workstation performed: [id number]     Xr Chest Pa & Lateral    Result Date: 7/2/2019  Impression Discoid atelectasis versus scarring right upper lobe  Workstation performed: [id number]      Ct Chest With Contrast    Result Date: 10/2/2019  Narrative CT CHEST WITH IV CONTRAST INDICATION:   R91 8: Other nonspecific abnormal finding of lung field  COMPARISON:  06/30/2019, 10/11/2018 TECHNIQUE: CT examination of the chest was performed  Axial, sagittal, and coronal 2D reformatted images were created from the source data and submitted for interpretation  Radiation dose length product (DLP) for this visit:  392 mGy-cm   This examination, like all CT scans performed in the [facility], was performed utilizing techniques to minimize radiation dose exposure, including the use of iterative reconstruction and automated exposure control  IV Contrast:  100 mL of iohexol (OMNIPAQUE) FINDINGS: LUNGS:  Severe emphysematous changes are present  A bulla is seen at the right lung base  New 4 mm left upper lobe nodule, image 14 series 3    No additional new nodules  Chronic airspace disease in the right lower lobe  Stable soft tissue density at the bifurcation of the left lower lobe pulmonary artery  PLEURA:  No pleural effusion  Stable pleural-based calcifications on the right  HEART/GREAT VESSELS:  Unremarkable for patient's age  MEDIASTINUM AND MIKE:  Hiatal hernia  There is some thickening of the distal esophagus    CHEST WALL AND LOWER NECK:   Unremarkable  VISUALIZED STRUCTURES IN THE UPPER ABDOMEN:  There is fatty change in the liver  Cyst in the upper pole of the left kidney    OSSEOUS STRUCTURES:  No acute fracture or destructive osseous lesion  Impression 1  Severe emphysema  2   New 4 mm nodule in the left upper lobe  Recommend CT scan of the chest in 12 months time for follow-up in this high risk patient based on Fleischner Society guidelines  3   Stable soft tissue density at the bifurcation of the left lower lobe pulmonary artery  4   Hiatal hernia with stable thickening of the distal esophagus  This may be on the basis of reflux  The study was marked in EPIC for significant notification  Workstation performed: [id number]          Micro: Lab Results   Component Value Date    BLOODCX No Growth After 5 Days  12/29/2017    BLOODCX No Growth After 5 Days   12/29/2017    WOUNDCULT 3+ Growth of Staphylococcus aureus (A) 06/12/2019    MRSACULTURE  11/11/2019     No Methicillin Resistant Staphlyococcus aureus (MRSA) isolated            Invalid input(s): LEGIONELLAURINARYANTIGEN        Code Status: Level 1 - Full Code

## 2019-11-13 NOTE — SOCIAL WORK
Day 2, Pt is not a MB  Explained role of CM to pt  Pt states [place], 2 steps to enter, was [employer] w/SPC, has a RW, drives, through [employer] DME he has a nebulizer and oxygen,  wears 2-3L NC AAT,  daughter lives locally and can assist if needed, uses [employer] in [employer] [place]  For his medications, does not have a medical POA/LW, declined information on both, has a hx w/[facility]  Pt denies any discharge needs at this time  None noted

## 2019-11-14 ENCOUNTER — APPOINTMENT (INPATIENT)
Dept: RADIOLOGY | Facility: HOSPITAL | Age: 74
DRG: 189 | End: 2019-11-14
Payer: MEDICARE

## 2019-11-14 LAB
ANION GAP SERPL CALCULATED.3IONS-SCNC: 6 MMOL/L (ref 4–13)
BASOPHILS # BLD AUTO: 0.02 THOUSANDS/ΜL (ref 0–0.1)
BASOPHILS NFR BLD AUTO: 0 % (ref 0–1)
BUN SERPL-MCNC: 22 MG/DL (ref 5–25)
CALCIUM SERPL-MCNC: 8.5 MG/DL (ref 8.3–10.1)
CHLORIDE SERPL-SCNC: 104 MMOL/L (ref 100–108)
CO2 SERPL-SCNC: 28 MMOL/L (ref 21–32)
CREAT SERPL-MCNC: 0.97 MG/DL (ref 0.6–1.3)
EOSINOPHIL # BLD AUTO: 0 THOUSAND/ΜL (ref 0–0.61)
EOSINOPHIL NFR BLD AUTO: 0 % (ref 0–6)
ERYTHROCYTE [DISTWIDTH] IN BLOOD BY AUTOMATED COUNT: 12.6 % (ref 11.6–15.1)
GFR SERPL CREATININE-BSD FRML MDRD: 76 ML/MIN/1.73SQ M
GLUCOSE SERPL-MCNC: 147 MG/DL (ref 65–140)
HCT VFR BLD AUTO: 40.7 % (ref 36.5–49.3)
HGB BLD-MCNC: 13.7 G/DL (ref 12–17)
IMM GRANULOCYTES # BLD AUTO: 0.16 THOUSAND/UL (ref 0–0.2)
IMM GRANULOCYTES NFR BLD AUTO: 2 % (ref 0–2)
LYMPHOCYTES # BLD AUTO: 0.91 THOUSANDS/ΜL (ref 0.6–4.47)
LYMPHOCYTES NFR BLD AUTO: 11 % (ref 14–44)
MAGNESIUM SERPL-MCNC: 2.1 MG/DL (ref 1.6–2.6)
MCH RBC QN AUTO: 32.3 PG (ref 26.8–34.3)
MCHC RBC AUTO-ENTMCNC: 33.7 G/DL (ref 31.4–37.4)
MCV RBC AUTO: 96 FL (ref 82–98)
MONOCYTES # BLD AUTO: 0.51 THOUSAND/ΜL (ref 0.17–1.22)
MONOCYTES NFR BLD AUTO: 6 % (ref 4–12)
NEUTROPHILS # BLD AUTO: 6.56 THOUSANDS/ΜL (ref 1.85–7.62)
NEUTS SEG NFR BLD AUTO: 81 % (ref 43–75)
NRBC BLD AUTO-RTO: 0 /100 WBCS
PHOSPHATE SERPL-MCNC: 3.3 MG/DL (ref 2.3–4.1)
PLATELET # BLD AUTO: 171 THOUSANDS/UL (ref 149–390)
PMV BLD AUTO: 9.3 FL (ref 8.9–12.7)
POTASSIUM SERPL-SCNC: 4.2 MMOL/L (ref 3.5–5.3)
RBC # BLD AUTO: 4.24 MILLION/UL (ref 3.88–5.62)
SODIUM SERPL-SCNC: 138 MMOL/L (ref 136–145)
WBC # BLD AUTO: 8.16 THOUSAND/UL (ref 4.31–10.16)

## 2019-11-14 PROCEDURE — 94760 N-INVAS EAR/PLS OXIMETRY 1: CPT

## 2019-11-14 PROCEDURE — 71046 X-RAY EXAM CHEST 2 VIEWS: CPT

## 2019-11-14 PROCEDURE — 94664 DEMO&/EVAL PT USE INHALER: CPT

## 2019-11-14 PROCEDURE — 85025 COMPLETE CBC W/AUTO DIFF WBC: CPT | Performed by: STUDENT IN AN ORGANIZED HEALTH CARE EDUCATION/TRAINING PROGRAM

## 2019-11-14 PROCEDURE — 80048 BASIC METABOLIC PNL TOTAL CA: CPT | Performed by: STUDENT IN AN ORGANIZED HEALTH CARE EDUCATION/TRAINING PROGRAM

## 2019-11-14 PROCEDURE — 99232 SBSQ HOSP IP/OBS MODERATE 35: CPT | Performed by: INTERNAL MEDICINE

## 2019-11-14 PROCEDURE — 94640 AIRWAY INHALATION TREATMENT: CPT

## 2019-11-14 PROCEDURE — 84100 ASSAY OF PHOSPHORUS: CPT | Performed by: STUDENT IN AN ORGANIZED HEALTH CARE EDUCATION/TRAINING PROGRAM

## 2019-11-14 PROCEDURE — 83735 ASSAY OF MAGNESIUM: CPT | Performed by: STUDENT IN AN ORGANIZED HEALTH CARE EDUCATION/TRAINING PROGRAM

## 2019-11-14 PROCEDURE — 99233 SBSQ HOSP IP/OBS HIGH 50: CPT | Performed by: FAMILY MEDICINE

## 2019-11-14 RX ORDER — BUDESONIDE 0.5 MG/2ML
0.5 INHALANT ORAL 2 TIMES DAILY
Qty: 60 VIAL | Refills: 11 | Status: SHIPPED | OUTPATIENT
Start: 2019-11-14 | End: 2019-11-15 | Stop reason: SDUPTHER

## 2019-11-14 RX ORDER — FORMOTEROL FUMARATE 20 UG/2ML
20 SOLUTION RESPIRATORY (INHALATION) 2 TIMES DAILY
Qty: 60 VIAL | Refills: 11 | Status: SHIPPED | OUTPATIENT
Start: 2019-11-14 | End: 2019-11-15 | Stop reason: SDUPTHER

## 2019-11-14 RX ORDER — IPRATROPIUM BROMIDE AND ALBUTEROL SULFATE 2.5; .5 MG/3ML; MG/3ML
SOLUTION RESPIRATORY (INHALATION)
Status: COMPLETED
Start: 2019-11-14 | End: 2019-11-14

## 2019-11-14 RX ADMIN — TAMSULOSIN HYDROCHLORIDE 0.4 MG: 0.4 CAPSULE ORAL at 08:41

## 2019-11-14 RX ADMIN — ZOLPIDEM TARTRATE 10 MG: 5 TABLET, COATED ORAL at 21:37

## 2019-11-14 RX ADMIN — GABAPENTIN 300 MG: 300 CAPSULE ORAL at 21:36

## 2019-11-14 RX ADMIN — METHYLPREDNISOLONE SODIUM SUCCINATE 40 MG: 40 INJECTION, POWDER, FOR SOLUTION INTRAMUSCULAR; INTRAVENOUS at 08:41

## 2019-11-14 RX ADMIN — METHYLPREDNISOLONE SODIUM SUCCINATE 40 MG: 40 INJECTION, POWDER, FOR SOLUTION INTRAMUSCULAR; INTRAVENOUS at 21:37

## 2019-11-14 RX ADMIN — FINASTERIDE 5 MG: 5 TABLET, FILM COATED ORAL at 08:41

## 2019-11-14 RX ADMIN — FORMOTEROL FUMARATE DIHYDRATE 20 MCG: 20 SOLUTION RESPIRATORY (INHALATION) at 08:39

## 2019-11-14 RX ADMIN — BUDESONIDE 0.5 MG: 0.5 INHALANT RESPIRATORY (INHALATION) at 08:15

## 2019-11-14 RX ADMIN — FORMOTEROL FUMARATE DIHYDRATE 20 MCG: 20 SOLUTION RESPIRATORY (INHALATION) at 20:11

## 2019-11-14 RX ADMIN — BUDESONIDE 0.5 MG: 0.5 INHALANT RESPIRATORY (INHALATION) at 20:11

## 2019-11-14 RX ADMIN — PANTOPRAZOLE SODIUM 40 MG: 40 TABLET, DELAYED RELEASE ORAL at 05:39

## 2019-11-14 RX ADMIN — ASPIRIN 81 MG: 81 TABLET, COATED ORAL at 08:41

## 2019-11-14 RX ADMIN — IPRATROPIUM BROMIDE AND ALBUTEROL SULFATE 3 ML: 2.5; .5 SOLUTION RESPIRATORY (INHALATION) at 20:11

## 2019-11-14 RX ADMIN — FAMOTIDINE 20 MG: 20 TABLET ORAL at 08:41

## 2019-11-14 RX ADMIN — IPRATROPIUM BROMIDE AND ALBUTEROL SULFATE 3 ML: 2.5; .5 SOLUTION RESPIRATORY (INHALATION) at 08:15

## 2019-11-14 RX ADMIN — IPRATROPIUM BROMIDE AND ALBUTEROL SULFATE 3 ML: 2.5; .5 SOLUTION RESPIRATORY (INHALATION) at 01:35

## 2019-11-14 RX ADMIN — GABAPENTIN 300 MG: 300 CAPSULE ORAL at 16:25

## 2019-11-14 RX ADMIN — FLUTICASONE PROPIONATE 2 SPRAY: 50 SPRAY, METERED NASAL at 10:12

## 2019-11-14 RX ADMIN — AMLODIPINE BESYLATE 10 MG: 10 TABLET ORAL at 08:40

## 2019-11-14 RX ADMIN — ENOXAPARIN SODIUM 40 MG: 40 INJECTION SUBCUTANEOUS at 08:40

## 2019-11-14 RX ADMIN — GABAPENTIN 300 MG: 300 CAPSULE ORAL at 08:41

## 2019-11-14 RX ADMIN — PANTOPRAZOLE SODIUM 40 MG: 40 TABLET, DELAYED RELEASE ORAL at 16:25

## 2019-11-14 NOTE — PROGRESS NOTES
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 presented with tachypnea and increased work of breathing, initially placed on BiPAP in the ED  Patient was weaned off of BiPAP, saturating well on 3 L nasal cannula (baseline) with only mild conversational tachypnea and VBG collected was normal   - Received Solu-Medrol 125 mg IV x1 in ED, will continue with Solu-Medrol 60 mg IV q8h  - Duonebs q4h kenneth with Mucomyst, Duonebs q2h prn  - Supplemental O2 as needed to maintain SpO2 >89%, patient normally uses 2-3L at home  - Continuous pulse oximetry monitoring  - Pulm consulted - recommends weaning patient off of 40 mg BID and DuoNebs Q6H as tolerated; discharge with Trelegy outpatient; f/u on new RUL nodules on CXR in 4-6 weeks    NSTEMI (non-ST elevated myocardial infarction) [facility])  Assessment & Plan  Patient denies chest pain prior to admission, likely type 2 demand ischemia  - Troponin elevated at 0 13, continued to downtrend in subsequent 3 readings   - EKG: normal sinus rhythm  - Telemetry monitoring  - Will administer ASA 325mg PO x1  - Will start therapeutic Lovenox 1mg/kg IV q12h   Patient was transitioned to prophylactic Lovenox after stress test ruled out myocardial ischemia with LVEF of 75%  - Consult cardiology, appreciate recommendations    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 (HCC)  Assessment & Plan  - Stable, continue local wound care  - Follow up with vascular outpatient    Essential hypertension  Assessment & Plan  Patient hypertensive on admission with -200/80s  - Will continue home amlodipine 10mg PO daily at this time, may need to add an additional anti-hypertensive agent if patient's BP remains elevated  - patient's systolic -276 since 4 AM of the night of admission    Gastroesophageal reflux disease  Assessment & Plan  - Stable, continue home Protonix 40mg PO BID    F - encourage oral hydration  E - monitor and correct electrolytes prn   N - Regular diet   D - SQL & SCD      Subjective:   Patient was seen at bedside  No acute events overnight  Patient continues to have dyspnea on mild exertion  Informed that this may be his new baseline  Patient denies any cough, but endorses some strange sensation within his upper chest bilaterally  Patient is aware of the right upper lung nodule  Patient is supposed to have a colonoscopy done at the hospital next Monday  However, his colonoscopy was canceled  David Cable the GI PA instructed to consult GI tomorrow morning so that the patient can have his colonoscopy done during this admission while he is stable  Nurse also wanted of wound care nurse to evaluate a small dry well circumscribed ulcer located superiorly to patient's left medial malleolus  Objective:     Vitals: Blood pressure 166/85, pulse 72, temperature 98 °F (36 7 °C), temperature source Oral, resp  rate 20, height 6' 5" (1 956 m), weight 87 4 kg (192 lb 10 9 oz), SpO2 97 %  ,Body mass index is 22 85 kg/m²  Wt Readings from Last 3 Encounters:   11/11/19 87 4 kg (192 lb 10 9 oz)   11/08/19 88 kg (194 lb)   10/29/19 88 6 kg (195 lb 7 oz)       Intake/Output Summary (Last 24 hours) at 11/14/2019 1338  Last data filed at 11/14/2019 0801  Gross per 24 hour   Intake 720 ml   Output 800 ml   Net -80 ml       Physical Exam:   Physical Exam   Constitutional: He is oriented to person, place, and time  No distress  Cardiovascular: Normal rate, regular rhythm and intact distal pulses  No murmur heard  Pulmonary/Chest: Effort normal and breath sounds normal  No respiratory distress  He has no wheezes  Neurological: He is alert and oriented to person, place, and time     Skin: Skin is warm  Lesion (sub-centimeter L dried ulcer) noted  He is not diaphoretic  Psychiatric: He has a normal mood and affect   His behavior is normal          Recent Results (from the past 24 hour(s))   Basic metabolic panel    Collection Time: 11/14/19  5:39 AM   Result Value Ref Range    Sodium 138 136 - 145 mmol/L    Potassium 4 2 3 5 - 5 3 mmol/L    Chloride 104 100 - 108 mmol/L    CO2 28 21 - 32 mmol/L    ANION GAP 6 4 - 13 mmol/L    BUN 22 5 - 25 mg/dL    Creatinine 0 97 0 60 - 1 30 mg/dL    Glucose 147 (H) 65 - 140 mg/dL    Calcium 8 5 8 3 - 10 1 mg/dL    eGFR 76 ml/min/1 73sq m   CBC and differential    Collection Time: 11/14/19  5:39 AM   Result Value Ref Range    WBC 8 16 4 31 - 10 16 Thousand/uL    RBC 4 24 3 88 - 5 62 Million/uL    Hemoglobin 13 7 12 0 - 17 0 g/dL    Hematocrit 40 7 36 5 - 49 3 %    MCV 96 82 - 98 fL    MCH 32 3 26 8 - 34 3 pg    MCHC 33 7 31 4 - 37 4 g/dL    RDW 12 6 11 6 - 15 1 %    MPV 9 3 8 9 - 12 7 fL    Platelets 396 097 - 791 Thousands/uL    nRBC 0 /100 WBCs    Neutrophils Relative 81 (H) 43 - 75 %    Immat GRANS % 2 0 - 2 %    Lymphocytes Relative 11 (L) 14 - 44 %    Monocytes Relative 6 4 - 12 %    Eosinophils Relative 0 0 - 6 %    Basophils Relative 0 0 - 1 %    Neutrophils Absolute 6 56 1 85 - 7 62 Thousands/µL    Immature Grans Absolute 0 16 0 00 - 0 20 Thousand/uL    Lymphocytes Absolute 0 91 0 60 - 4 47 Thousands/µL    Monocytes Absolute 0 51 0 17 - 1 22 Thousand/µL    Eosinophils Absolute 0 00 0 00 - 0 61 Thousand/µL    Basophils Absolute 0 02 0 00 - 0 10 Thousands/µL   Magnesium    Collection Time: 11/14/19  5:39 AM   Result Value Ref Range    Magnesium 2 1 1 6 - 2 6 mg/dL   Phosphorus    Collection Time: 11/14/19  5:39 AM   Result Value Ref Range    Phosphorus 3 3 2 3 - 4 1 mg/dL       Current Facility-Administered Medications   Medication Dose Route Frequency Provider Last Rate Last Dose    amLODIPine (NORVASC) tablet 10 mg  10 mg Oral Daily Linda Mccall MD   10 mg at 11/14/19 0840    aspirin (ECOTRIN LOW STRENGTH) EC tablet 81 mg  81 mg Oral Daily TOPHER Samuels   81 mg at 11/14/19 0841    budesonide (PULMICORT) inhalation solution 0 5 mg  0 5 mg Nebulization Q12H CLIFTON YeagerC   0 5 mg at 11/14/19 0815    colestipol (COLESTID) tablet 1 g  1 g Oral BID Kathryn Jay MD        enoxaparin (LOVENOX) subcutaneous injection 40 mg  40 mg Subcutaneous Q24H [address] Clarisajose francisco Tran, DO   40 mg at 11/14/19 0840    famotidine (PEPCID) tablet 20 mg  20 mg Oral Daily Kathryn Jay MD   20 mg at 11/14/19 0841    finasteride (PROSCAR) tablet 5 mg  5 mg Oral QAM Katrhyn Jay MD   5 mg at 11/14/19 0841    fluticasone (FLONASE) 50 mcg/act nasal spray 2 spray  2 spray Each Nare Daily Kahtryn Jay MD   2 spray at 11/14/19 1012    formoterol (PERFOROMIST) nebulizer solution 20 mcg  20 mcg Nebulization Q12H CLIFTON YeagerC   20 mcg at 11/14/19 0839    gabapentin (NEURONTIN) capsule 300 mg  300 mg Oral TID Kathryn Jay MD   300 mg at 11/14/19 0841    ipratropium-albuterol (DUO-NEB) 0 5-2 5 mg/3 mL inhalation solution 3 mL  3 mL Nebulization Q2H PRN aKthryn Jay MD   3 mL at 11/11/19 1844    ipratropium-albuterol (DUO-NEB) 0 5-2 5 mg/3 mL inhalation solution 3 mL  3 mL Nebulization Q6H Maria Victoria Verde DO   3 mL at 11/14/19 0815    methylPREDNISolone sodium succinate (Solu-MEDROL) injection 40 mg  40 mg Intravenous Q12H [address] Maria Victoria Verde, DO   40 mg at 11/14/19 0841    pantoprazole (PROTONIX) EC tablet 40 mg  40 mg Oral BID AC Kathryn Jay MD   40 mg at 11/14/19 0539    tamsulosin (FLOMAX) capsule 0 4 mg  0 4 mg Oral Daily Kathryn Jay MD   0 4 mg at 11/14/19 0841    zolpidem (AMBIEN) tablet 10 mg  10 mg Oral HS Kathryn Jay MD   10 mg at 11/13/19 2308       Invasive Devices     Peripheral Intravenous Line            Peripheral IV 11/11/19 Right Forearm 3 days                Lab, Imaging and other studies: I have personally reviewed pertinent reports      VTE Pharmacologic Prophylaxis: Enoxaparin (Lovenox)  VTE Mechanical Prophylaxis: sequential compression device    Kerry Silverio MD

## 2019-11-14 NOTE — PROGRESS NOTES
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 presented with tachypnea and increased work of breathing, initially placed on BiPAP in the ED  Patient was weaned off of BiPAP, saturating well on 3 L nasal cannula (baseline) with only mild conversational tachypnea and VBG collected was normal   - Received Solu-Medrol 125 mg IV x1 in ED, placed on Solu-Medrol 60 mg IV q8h initially, weaned to 40mg IV q12h  Will continue to wean as tolerated  - Supplemental O2 as needed to maintain SpO2 >89%, patient normally uses 2-3L at home  - Duonebs q6h kenneth, q2h prn  - Started on Pulmicort 0 5mg neb q12h and Perforomist 20mcg neb q12h on 11/12 per pulmonology recommendations  - Pulmonology consulted, recommend continuing Pulmicort and Perforomist along with Duonebs on discharge as well  Does not recommend right heart catheterization as pulmonary hypertension can be explained by severe emphysema  Avoid vasodilator therapy  Follow up right upper lobe lung nodule with repeat CXR in 4-6 weeks  NSTEMI  Patient denies chest pain prior to admission, likely type 2 demand ischemia due to RV strain  - Troponin peaked at 0 13 on admission  - EKG: normal sinus rhythm  - Telemetry monitoring  - Received ASA 325mg PO x1 on admission, continue ASA 81mg PO daily  - Stress test performed 11/11 showed no ischemia  - Cardiology consulted, recommended continuing COPD treatment and consider statin therapy if no contraindication  Avoid beta blockers      HTN  Patient hypertensive on admission with -200/80s, has become normotensive since then  - Continue home amlodipine 10mg PO daily    GERD  - Stable, continue home Protonix 40mg PO BID    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  Stable, continue local wound care  - Follow up with vascular outpatient      Subjective:   Patient seen and examined at bedside  No acute events overnight  Patient denies headache, fevers, dizziness, CP, nausea, vomiting, diarrhea, constipation and urinary symptoms  Patient continues to have increased work of breathing on exertion  Objective:     Vitals: Blood pressure 139/67, pulse 85, temperature 98 3 °F (36 8 °C), temperature source Oral, resp  rate 20, height 6' 5" (1 956 m), weight 87 4 kg (192 lb 10 9 oz), SpO2 96 %  ,Body mass index is 22 85 kg/m²  Intake/Output Summary (Last 24 hours) at 11/13/2019 2144  Last data filed at 11/13/2019 0830  Gross per 24 hour   Intake 240 ml   Output 975 ml   Net -735 ml       Physical Exam:   General: Pt is AAO x 3, not in any acute distress  Cardio: RRR, S1 and S2 +, no murmurs/rubs/gallops/clicks  Resp: decreased breath sounds diffusely  Abdomen: soft, NT/ND, BS+  Extremities: no cyanosis or edema  PP 2+ b/l  Invasive Devices     Peripheral Intravenous Line            Peripheral IV 11/11/19 Right Forearm 2 days                Lab, Imaging and other studies: I have personally reviewed pertinent reports      VTE Pharmacologic Prophylaxis: Enoxaparin (Lovenox)  VTE Mechanical Prophylaxis: sequential compression device     Tia Hopper MD  11/13/2019  9:58 PM

## 2019-11-14 NOTE — PLAN OF CARE
Problem: Potential for Falls  Goal: Patient will remain free of falls  Description  INTERVENTIONS:  - Assess patient frequently for physical needs  -  Identify cognitive and physical deficits and behaviors that affect risk of falls    -  [place] fall precautions as indicated by assessment   - Educate patient/family on patient safety including physical limitations  - Instruct patient to call for assistance with activity based on assessment  - Modify environment to reduce risk of injury  - Consider OT/PT consult to assist with strengthening/mobility  Outcome: Progressing     Problem: RESPIRATORY - ADULT  Goal: Achieves optimal ventilation and oxygenation  Description  INTERVENTIONS:  - Assess for changes in respiratory status  - Assess for changes in mentation and behavior  - Position to facilitate oxygenation and minimize respiratory effort  - Oxygen administered by appropriate delivery if ordered  - Initiate smoking cessation education as indicated  - Encourage broncho-pulmonary hygiene including cough, deep breathe, Incentive Spirometry  - Assess the need for suctioning and aspirate as needed  - Assess and instruct to report SOB or any respiratory difficulty  - Respiratory Therapy support as indicated  Outcome: Progressing     Problem: GASTROINTESTINAL - ADULT  Goal: Maintains or returns to baseline bowel function  Description  INTERVENTIONS:  - Assess bowel function  - Encourage oral fluids to ensure adequate hydration  - Administer IV fluids if ordered to ensure adequate hydration  - Administer ordered medications as needed  - Encourage mobilization and activity  - Consider nutritional services referral to assist patient with adequate nutrition and appropriate food choices  Outcome: Progressing

## 2019-11-14 NOTE — PROGRESS NOTES
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:  -Ratio 45% / FEV1 44%   -is normally on weekly replacement of alpha 1 proteinase inhibitor   -transitioned to peroformist / pulmincort 11/12 > this will be a permanent change provided we can get this covered through DME  -continue solumedrol 40 Q 12   -cough is wheezing precipitant  -duoneb Q 6 hours and Q 2 prn     Acute on Chronic Hypoxemic Respiratory Failure  -stabilized, remains on 2 L NC   -chronically on 2-3 L NC /   -recurrent dyspnea on ambulation     Recurrent Diarrhea:  -pt was scheduled for colonoscopy Monday  -primary team managing / arranging     Secondary Pulmonary Hypertension:  -likely both group 2,3 in setting of severe emphysematous COPD and / chronic diastolic dysfunction per echo 7/2019  -PAP 51 MMHG  -unlikely significant benefit from [address] and or treatment given secondary to underlying pathology     Pulmonary Nodules:  -new RUL nodule per CXR finding on 11/10  -Hx of CAROLYN nodule on most recent CT October 2019  -f/u PA LATERAL today            ______________________________________________________________________    Subjective: Pt seen and examined at bedside  Still remains dyspneic w/ ambulation  This has been a chronic an ongoing problem  Tele Events:     Vitals:   Temp:  [97 6 °F (36 4 °C)-98 3 °F (36 8 °C)] 98 °F (36 7 °C)  HR:  [72-85] 72  Resp:  [18-22] 20  BP: (129-166)/(67-85) 166/85  Weight (last 2 days)     None        Oxygen Therapy  SpO2: 97 %  O2 Flow Rate (L/min): 2 L/min        IV Infusions:       Nutrition:        Diet Orders   (From admission, onward)             Start     Ordered    11/11/19 5345  Diet Regular; Regular House  Diet effective now Comments:  *If patient is having a stress test, no caffeine, decaf, or chocolate is to be given*   Question Answer Comment   Diet Type Regular    Regular Regular House    RD to adjust diet per protocol? Yes        11/11/19 1528    11/11/19 0813  Room Service  Once     Question:  Type of Service  Answer:  Room Service - Appropriate with Assistance    11/11/19 0813                Ins/Outs:   I/O       11/12 0701 - 11/13 0700 11/13 0701 - 11/14 0700 11/14 0701 - 11/15 0700    P  O  720 720 240    I V  (mL/kg)       Total Intake(mL/kg) 720 (8 2) 720 (8 2) 240 (2 7)    Urine (mL/kg/hr) 1300 (0 6) 1000 (0 5)     Stool 0      Total Output 1300 1000     Net -580 -280 +240           Unmeasured Stool Occurrence 1 x            Lines/Drains:  Invasive Devices     Peripheral Intravenous Line            Peripheral IV 11/11/19 Right Forearm 2 days                 Active medications:  Scheduled Meds:  Current Facility-Administered Medications:  amLODIPine 10 mg Oral Daily Mary Alice Zee MD   aspirin 81 mg Oral Daily TerryheTOPHER Payne   budesonide 0 5 mg Nebulization Q12H Karson Servin PA-C   colestipol 1 g Oral BID Mary Alice Zee MD   enoxaparin 40 mg Subcutaneous Q24H [address] Gama Dawson, DO   famotidine 20 mg Oral Daily Mary Alice Zee MD   finasteride 5 mg Oral QAM Mary Alice Zee MD   fluticasone 2 spray Each Nare Daily Mary Alice Zee MD   formoterol 20 mcg Nebulization Q12H Karson Servin PA-C   gabapentin 300 mg Oral TID Mary Alice Zee MD   ipratropium-albuterol 3 mL Nebulization Q2H PRN Mary Alice Zee MD   ipratropium-albuterol 3 mL Nebulization Q6H Maria Victoria Verde, DO   methylPREDNISolone sodium succinate 40 mg Intravenous Q12H [address], DO   pantoprazole 40 mg Oral BID AC Mary Alice Zee MD   tamsulosin 0 4 mg Oral Daily Mary Alice Zee MD   zolpidem 10 mg Oral HS Mary Alice Zee MD     PRN Meds:  ipratropium-albuterol 3 mL Q2H PRN     ____________________________________________________________________      Physical Exam Constitutional: He is oriented to person, place, and time  He appears well-developed  No distress  Chronic ill apperance   HENT:   Head: Normocephalic and atraumatic  Eyes: Pupils are equal, round, and reactive to light  No scleral icterus  Neck: Normal range of motion  No tracheal deviation present  Cardiovascular: Normal rate, regular rhythm, normal heart sounds and intact distal pulses  Exam reveals no gallop and no friction rub  No murmur heard  Pulmonary/Chest: No accessory muscle usage or stridor  No tachypnea  No respiratory distress  He has decreased breath sounds in the right upper field, the right middle field, the right lower field, the left upper field, the left middle field and the left lower field  He has no wheezes  He has no rhonchi  He has no rales  He exhibits no tenderness  Decreased breath sounds at at baseline    Expiratory wheeze w/ cough only    Currently on 2 L NC   Abdominal: Soft  He exhibits no distension  There is no tenderness  There is no rebound and no guarding  Musculoskeletal: Normal range of motion  He exhibits no edema  Neurological: He is alert and oriented to person, place, and time  Skin: Skin is warm and dry     Psychiatric: He has a normal mood and affect            ____________________________________________________________________    Labs:   CBC: Results from last 7 days   Lab Units 11/14/19  0539 11/11/19  0309 11/10/19  2031   WBC Thousand/uL 8 16 7 23 8 44   HEMOGLOBIN g/dL 13 7 14 3 15 6   HEMATOCRIT % 40 7 42 3 45 9   MCV fL 96 95 95   PLATELETS Thousands/uL 171 180 206     CMP: Results from last 7 days   Lab Units 11/14/19  0539 11/13/19  0941 11/11/19  0309   POTASSIUM mmol/L 4 2 4 3 4 1   CHLORIDE mmol/L 104 102 103   CO2 mmol/L 28 26 24   BUN mg/dL 22 23 14   CREATININE mg/dL 0 97 1 05 1 35*   CALCIUM mg/dL 8 5 9 0 8 7   AST U/L  --   --  25   ALT U/L  --   --  37   ALK PHOS U/L  --   --  78   EGFR ml/min/1 73sq m 76 69 51     No components found for: ABG    Magnesium:   Results from last 7 days   Lab Units 11/14/19  0539   MAGNESIUM mg/dL 2 1     Phosphorous:   Results from last 7 days   Lab Units 11/14/19  0539   PHOSPHORUS mg/dL 3 3     Troponin:   Results from last 7 days   Lab Units 11/11/19  0748 11/11/19  0309   TROPONIN I ng/mL 0 05* 0 07*     PT/INR:   Results from last 7 days   Lab Units 11/10/19  2031   PTT seconds 26   INR  0 99     Lactic Acid:     BNP:   Results from last 7 days   Lab Units 11/10/19  2032   NT-PRO BNP pg/mL 170     TSH:     Procalcitonin: Invalid input(s): PROCALCITONIN      Imaging:   Xr Chest Pa & Lateral    Result Date: 7/30/2019  Impression No acute cardiopulmonary disease  COPD  Workstation performed: [id number]     Xr Chest Pa & Lateral    Result Date: 7/23/2019  Impression No acute cardiopulmonary disease  Chronic changes  Workstation performed: [id number]     Xr Chest Pa & Lateral    Result Date: 7/2/2019  Impression Discoid atelectasis versus scarring right upper lobe  Workstation performed: [id number]      Ct Chest With Contrast    Result Date: 10/2/2019  Narrative CT CHEST WITH IV CONTRAST INDICATION:   R91 8: Other nonspecific abnormal finding of lung field  COMPARISON:  06/30/2019, 10/11/2018 TECHNIQUE: CT examination of the chest was performed  Axial, sagittal, and coronal 2D reformatted images were created from the source data and submitted for interpretation  Radiation dose length product (DLP) for this visit:  392 mGy-cm   This examination, like all CT scans performed in the [facility], was performed utilizing techniques to minimize radiation dose exposure, including the use of iterative reconstruction and automated exposure control  IV Contrast:  100 mL of iohexol (OMNIPAQUE) FINDINGS: LUNGS:  Severe emphysematous changes are present  A bulla is seen at the right lung base  New 4 mm left upper lobe nodule, image 14 series 3  No additional new nodules    Chronic airspace disease in the right lower lobe  Stable soft tissue density at the bifurcation of the left lower lobe pulmonary artery  PLEURA:  No pleural effusion  Stable pleural-based calcifications on the right  HEART/GREAT VESSELS:  Unremarkable for patient's age  MEDIASTINUM AND MIKE:  Hiatal hernia  There is some thickening of the distal esophagus    CHEST WALL AND LOWER NECK:   Unremarkable  VISUALIZED STRUCTURES IN THE UPPER ABDOMEN:  There is fatty change in the liver  Cyst in the upper pole of the left kidney    OSSEOUS STRUCTURES:  No acute fracture or destructive osseous lesion  Impression 1  Severe emphysema  2   New 4 mm nodule in the left upper lobe  Recommend CT scan of the chest in 12 months time for follow-up in this high risk patient based on Fleischner Society guidelines  3   Stable soft tissue density at the bifurcation of the left lower lobe pulmonary artery  4   Hiatal hernia with stable thickening of the distal esophagus  This may be on the basis of reflux  The study was marked in EPIC for significant notification  Workstation performed: [id number]          Micro: Lab Results   Component Value Date    BLOODCX No Growth After 5 Days  12/29/2017    BLOODCX No Growth After 5 Days   12/29/2017    WOUNDCULT 3+ Growth of Staphylococcus aureus (A) 06/12/2019    MRSACULTURE  11/11/2019     No Methicillin Resistant Staphlyococcus aureus (MRSA) isolated            Invalid input(s): LEGIONELLAURINARYANTIGEN        Code Status: Level 1 - Full Code

## 2019-11-15 ENCOUNTER — TELEPHONE (OUTPATIENT)
Dept: GASTROENTEROLOGY | Facility: CLINIC | Age: 74
End: 2019-11-15

## 2019-11-15 VITALS
HEIGHT: 77 IN | WEIGHT: 192.68 LBS | TEMPERATURE: 98.1 F | OXYGEN SATURATION: 96 % | SYSTOLIC BLOOD PRESSURE: 156 MMHG | RESPIRATION RATE: 20 BRPM | DIASTOLIC BLOOD PRESSURE: 79 MMHG | HEART RATE: 64 BPM | BODY MASS INDEX: 22.75 KG/M2

## 2019-11-15 DIAGNOSIS — E88.01 AAT (ALPHA-1-ANTITRYPSIN) DEFICIENCY (HCC): ICD-10-CM

## 2019-11-15 DIAGNOSIS — J43.2 CENTRILOBULAR EMPHYSEMA (HCC): ICD-10-CM

## 2019-11-15 DIAGNOSIS — J96.21 ACUTE ON CHRONIC RESPIRATORY FAILURE WITH HYPOXIA (HCC): ICD-10-CM

## 2019-11-15 DIAGNOSIS — J44.1 COPD EXACERBATION (HCC): ICD-10-CM

## 2019-11-15 PROBLEM — I21.4 NSTEMI (NON-ST ELEVATED MYOCARDIAL INFARCTION) (HCC): Status: RESOLVED | Noted: 2019-11-10 | Resolved: 2019-11-15

## 2019-11-15 LAB
ANION GAP SERPL CALCULATED.3IONS-SCNC: 8 MMOL/L (ref 4–13)
BUN SERPL-MCNC: 23 MG/DL (ref 5–25)
CALCIUM SERPL-MCNC: 8.5 MG/DL (ref 8.3–10.1)
CHLORIDE SERPL-SCNC: 102 MMOL/L (ref 100–108)
CO2 SERPL-SCNC: 26 MMOL/L (ref 21–32)
CREAT SERPL-MCNC: 0.96 MG/DL (ref 0.6–1.3)
GFR SERPL CREATININE-BSD FRML MDRD: 77 ML/MIN/1.73SQ M
GLUCOSE SERPL-MCNC: 143 MG/DL (ref 65–140)
POTASSIUM SERPL-SCNC: 4.4 MMOL/L (ref 3.5–5.3)
SODIUM SERPL-SCNC: 136 MMOL/L (ref 136–145)

## 2019-11-15 PROCEDURE — 80048 BASIC METABOLIC PNL TOTAL CA: CPT | Performed by: STUDENT IN AN ORGANIZED HEALTH CARE EDUCATION/TRAINING PROGRAM

## 2019-11-15 PROCEDURE — 94761 N-INVAS EAR/PLS OXIMETRY MLT: CPT

## 2019-11-15 PROCEDURE — 99238 HOSP IP/OBS DSCHRG MGMT 30/<: CPT | Performed by: FAMILY MEDICINE

## 2019-11-15 PROCEDURE — 94760 N-INVAS EAR/PLS OXIMETRY 1: CPT

## 2019-11-15 PROCEDURE — 99232 SBSQ HOSP IP/OBS MODERATE 35: CPT | Performed by: INTERNAL MEDICINE

## 2019-11-15 PROCEDURE — 94640 AIRWAY INHALATION TREATMENT: CPT

## 2019-11-15 RX ORDER — FORMOTEROL FUMARATE 20 UG/2ML
20 SOLUTION RESPIRATORY (INHALATION) 2 TIMES DAILY
Qty: 60 VIAL | Refills: 0 | Status: SHIPPED | OUTPATIENT
Start: 2019-11-15 | End: 2019-11-15

## 2019-11-15 RX ORDER — BUDESONIDE 0.5 MG/2ML
0.5 INHALANT ORAL 2 TIMES DAILY
Qty: 60 VIAL | Refills: 0 | Status: SHIPPED | OUTPATIENT
Start: 2019-11-15 | End: 2019-11-15 | Stop reason: SDUPTHER

## 2019-11-15 RX ORDER — BUDESONIDE 0.5 MG/2ML
0.5 INHALANT ORAL 2 TIMES DAILY
Qty: 10 VIAL | Refills: 0 | Status: SHIPPED | OUTPATIENT
Start: 2019-11-15 | End: 2020-09-24 | Stop reason: SDUPTHER

## 2019-11-15 RX ORDER — FORMOTEROL FUMARATE 20 UG/2ML
20 SOLUTION RESPIRATORY (INHALATION) 2 TIMES DAILY
Qty: 60 VIAL | Refills: 0 | Status: SHIPPED | OUTPATIENT
Start: 2019-11-15 | End: 2019-11-15 | Stop reason: SDUPTHER

## 2019-11-15 RX ORDER — PREDNISONE 20 MG/1
40 TABLET ORAL DAILY
Qty: 16 TABLET | Refills: 0 | Status: SHIPPED | OUTPATIENT
Start: 2019-11-15 | End: 2019-11-23

## 2019-11-15 RX ORDER — IPRATROPIUM BROMIDE AND ALBUTEROL SULFATE 2.5; .5 MG/3ML; MG/3ML
3 SOLUTION RESPIRATORY (INHALATION) 4 TIMES DAILY
Qty: 120 VIAL | Refills: 0 | Status: SHIPPED | OUTPATIENT
Start: 2019-11-15 | End: 2019-11-15 | Stop reason: SDUPTHER

## 2019-11-15 RX ORDER — PREDNISONE 20 MG/1
40 TABLET ORAL DAILY
Qty: 16 TABLET | Refills: 0 | Status: SHIPPED | OUTPATIENT
Start: 2019-11-15 | End: 2019-11-15 | Stop reason: SDUPTHER

## 2019-11-15 RX ORDER — FORMOTEROL FUMARATE 20 UG/2ML
20 SOLUTION RESPIRATORY (INHALATION) 2 TIMES DAILY
Qty: 20 ML | Refills: 0 | Status: SHIPPED | OUTPATIENT
Start: 2019-11-15 | End: 2019-11-20

## 2019-11-15 RX ORDER — BUDESONIDE 0.5 MG/2ML
0.5 INHALANT ORAL 2 TIMES DAILY
Qty: 60 VIAL | Refills: 0 | Status: SHIPPED | OUTPATIENT
Start: 2019-11-15 | End: 2019-11-15

## 2019-11-15 RX ORDER — IPRATROPIUM BROMIDE AND ALBUTEROL SULFATE 2.5; .5 MG/3ML; MG/3ML
3 SOLUTION RESPIRATORY (INHALATION) 4 TIMES DAILY
Qty: 120 VIAL | Refills: 6 | Status: SHIPPED | OUTPATIENT
Start: 2019-11-15 | End: 2021-01-21

## 2019-11-15 RX ADMIN — ASPIRIN 81 MG: 81 TABLET, COATED ORAL at 08:21

## 2019-11-15 RX ADMIN — METHYLPREDNISOLONE SODIUM SUCCINATE 40 MG: 40 INJECTION, POWDER, FOR SOLUTION INTRAMUSCULAR; INTRAVENOUS at 08:22

## 2019-11-15 RX ADMIN — AMLODIPINE BESYLATE 10 MG: 10 TABLET ORAL at 08:21

## 2019-11-15 RX ADMIN — IPRATROPIUM BROMIDE AND ALBUTEROL SULFATE 3 ML: 2.5; .5 SOLUTION RESPIRATORY (INHALATION) at 13:18

## 2019-11-15 RX ADMIN — FLUTICASONE PROPIONATE 2 SPRAY: 50 SPRAY, METERED NASAL at 08:54

## 2019-11-15 RX ADMIN — IPRATROPIUM BROMIDE AND ALBUTEROL SULFATE 3 ML: 2.5; .5 SOLUTION RESPIRATORY (INHALATION) at 01:46

## 2019-11-15 RX ADMIN — FAMOTIDINE 20 MG: 20 TABLET ORAL at 08:21

## 2019-11-15 RX ADMIN — GABAPENTIN 300 MG: 300 CAPSULE ORAL at 08:21

## 2019-11-15 RX ADMIN — ENOXAPARIN SODIUM 40 MG: 40 INJECTION SUBCUTANEOUS at 08:21

## 2019-11-15 RX ADMIN — BUDESONIDE 0.5 MG: 0.5 INHALANT RESPIRATORY (INHALATION) at 07:25

## 2019-11-15 RX ADMIN — FINASTERIDE 5 MG: 5 TABLET, FILM COATED ORAL at 08:21

## 2019-11-15 RX ADMIN — IPRATROPIUM BROMIDE AND ALBUTEROL SULFATE 3 ML: 2.5; .5 SOLUTION RESPIRATORY (INHALATION) at 07:25

## 2019-11-15 RX ADMIN — PANTOPRAZOLE SODIUM 40 MG: 40 TABLET, DELAYED RELEASE ORAL at 05:29

## 2019-11-15 RX ADMIN — FORMOTEROL FUMARATE DIHYDRATE 20 MCG: 20 SOLUTION RESPIRATORY (INHALATION) at 07:25

## 2019-11-15 RX ADMIN — TAMSULOSIN HYDROCHLORIDE 0.4 MG: 0.4 CAPSULE ORAL at 08:21

## 2019-11-15 NOTE — TELEPHONE ENCOUNTER
Pt called to confirm that he is still on dr Jesse Jiménez schedule  He stated someone in the hospital canceled him bc he did not know pt was on his schedule, and that pt needs to be done in hospital due to breathing problems  pls confirm weather or not pt is on for Monday

## 2019-11-15 NOTE — NURSING NOTE
AVS Summary and prescription given and reviewed with patient  Verbalize understanding  IV access removed and patient tolerated  Patient left the floor with Oxygen on with his own tank, as well as his his bedside personal belongings  Left the floor via wheelchair

## 2019-11-15 NOTE — DISCHARGE SUMMARY
Saira Daiman [facility] Discharge Summary - Medical Nicole Telles 75 y o  male MRN: [id number]    [place] 44  Room / Bed: [address] Encounter: [id number]    BRIEF OVERVIEW  Admitting Provider: Randall Odell MD  Discharge Provider: No att  providers found    Discharge To: Home     Outpatient Follow-Up:   F/U at [facility] for Transition of Care Appt  F/U Pulmonology with Carlos IBARRA   F/U Gastroenterology for colonoscopy    Things to address at first follow up visit:   Compliance with new Perforomist and Pulmicort  Continue prednisone 40 mg daily until changed by Dayan Plasencia of pulm  Patient having difficulty obtaining nebulizers with new tubing    - please confirm that this is accomplished  Home O2 eval:  3 L both at rest and with ambulation  Needs a colonoscopy, which will require PACU monitoring 2/2 lung disease  To be scheduled by GI within 2 weeks  Always needs to follow closely with pulmonology due to alpha-1 antitrypsin  Repeat CXR in 4-6 weeks to follow-up nodular opacity in right upper lobe    Labs and results pending at discharge: None     Admission Date: 11/10/2019     Discharge Date: 11/15/2019    Discharge Diagnoses:  Principal Problem:    Acute on chronic respiratory failure with hypoxia ([address] )  Active Problems:    AAT (alpha-1-antitrypsin) deficiency (HCC)    Gastroesophageal reflux disease    Essential hypertension    BPH (benign prostatic hyperplasia)    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 ([address] )    Pulmonary hypertension ([facility] )  Resolved Problems:    NSTEMI (non-ST elevated myocardial infarction) ([facility] [address] [address] )    Consulting Providers  -  [address] [place] [address] STAY  Procedures Performed/Pertinent Test results  11/15: Home O2 eval - 3 L of O2 at rest and with activity   11/14: CXR:Stable right upper lobe opacity which may be inflammatory in origin    Follow-up CXR recommended in 1 month  11/12: MRSA neg  11/11: Cr: 1 35   -Stress: negative; LVEF 75%  11/10: Trop 0 13 - 0 10 - 0 07 - 0 05  VBG - pH 7 402, pO2 54 2  CXR neg   Duo Nebs Q4-6h      HPI  Copied from H&P-  This is a 78-year-old male with a PMHx of chronic respiratory failure, HTN, severe centrilobular emphysema, alpha 1 antitrypsin deficiency, GERD, BPH, pulmonary hypertension, and venous ulcer of left lower extremity with varicose veins presenting with shortness of breath and NSTEMI  Patient states his shortness of breath started acutely this morning and continued to get worse throughout the day  Patient has been compliant with his home trilogy and albuterol, however did not get any relief with these  Patient denies any chest pain, however says his chest feels tight possibly due to chest congestion  He also states he has left lower quadrant pain which is being worked up by GI outpatient with a colonoscopy  Patient has had trouble scheduling a colonoscopy as his pulmonologist recommends performing the procedure at the hospital due to his breathing problems  Hospital Course  * Acute on chronic respiratory failure with hypoxia [facility])  Assessment & Plan  Patient with a history of severe centrilobular emphysema and AAT deficiency (on weekly Zemaira) injections presented with tachypnea and increased work of breathing, initially placed on BiPAP in the ED   Patient was weaned off of BiPAP, saturating well on 3 L nasal cannula (baseline) with only mild conversational tachypnea and VBG collected was normal   - Received Solu-Medrol 125 mg IV x1 in ED, will continue with Solu-Medrol 60 mg IV q8h  - Duonebs q4h kenneth with Mucomyst, Duonebs q2h prn  - Supplemental O2 as needed to maintain SpO2 >89%, patient normally uses 2-3L at home  - Continuous pulse oximetry monitoring  - Pulm consulted - recommends weaning patient off of 40 mg BID and DuoNebs Q6H as tolerated; discharge with Trelegy outpatient; f/u on new RUL nodules on CXR in 4-6 weeks  - Patient's dyspnea on exertion is stable and unimproved · Pulm ordered another CXR to identify any changes since admission - no interval changes were noted  · Pulm ordered Pulmonary Function Test to see if he may be a candidate for bronchoscopic lung volume reduction with endobronchial valve placement  · Patient discharged with prednisone 40 mg for 7 days until his f/u appointment with Reji Ayers  Patient will also start taking Trelegy, Perforomist, & Pulmicort  · Based on patient's Home O2 eval, he will be on 3 L of O2 during rest and activity    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 (HCC)  Assessment & Plan  - Stable, continue local wound care  - Follow up with vascular outpatient    Essential hypertension  Assessment & Plan  Patient hypertensive on admission with -200/80s  - Will continue home amlodipine 10mg PO daily at this time, may need to add an additional anti-hypertensive agent if patient's BP remains elevated  - patient's systolic -303 since 4 AM of the night of admission    Gastroesophageal reflux disease  Assessment & Plan  - Stable, continue home Protonix 40mg PO BID    NSTEMI (non-ST elevated myocardial infarction) ([facility])resolved as of 11/15/2019  Assessment & Plan  Patient denies chest pain prior to admission, likely type 2 demand ischemia  - Troponin elevated at 0 13, continued to downtrend in subsequent 3 readings   - EKG: normal sinus rhythm  - Telemetry monitoring  - Will administer ASA 325mg PO x1  - Will start therapeutic Lovenox 1mg/kg IV q12h  Patient was transitioned to prophylactic Lovenox after stress test ruled out myocardial ischemia with LVEF of 75%  - Consult cardiology, appreciate recommendations    Physical Exam at Discharge  Vitals:    11/15/19 1318   BP:    Pulse:    Resp:    Temp:    SpO2: 96%   Physical Exam   Constitutional: He is oriented to person, place, and time  No distress  Cardiovascular: Normal rate, regular rhythm and intact distal pulses  No murmur heard    Pulmonary/Chest: Effort normal and breath sounds normal  No respiratory distress  He has no wheezes  Neurological: He is alert and oriented to person, place, and time  Skin: Skin is warm  Lesion (sub-centimeter L dried ulcer) noted  He is not diaphoretic  Psychiatric: He has a normal mood and affect  His behavior is normal      Medications   Copied from the AVS  STOP taking these medications     STOP taking these medications    cephalexin 500 mg capsule   Commonly known as: KEFLEX    TAKE these medications       TAKE these medications        Prednisone 40 mg p o  Daily    * albuterol (2 5 mg/3 mL) 0 083 % nebulizer solution   Inhale 1 each every 4 (four) hours as needed   Refills: 0     * albuterol 90 mcg/act inhaler   Commonly known as: PROVENTIL HFA,VENTOLIN HFA   Inhale 2 puffs 4 (four) times a day   Refills: 5   Doctor's comments: Please substitute based on patient insurance     amLODIPine 10 mg tablet   Commonly known as: NORVASC   TAKE ONE TABLET BY MOUTH ONE TIME DAILY   Refills: 4     bisacodyl 5 mg EC tablet   Commonly known as: DULCOLAX   Take 2 tablets (10 mg total) by mouth once for 1 dose Per office instructions   Refills: 0     budesonide 0 5 mg/2 mL nebulizer solution   Commonly known as: PULMICORT   Take 1 vial (0 5 mg total) by nebulization 2 (two) times a day Rinse mouth after use  Refills: 0     colestipol 1 g tablet   Commonly known as: COLESTID   Take 1 tablet (1 g total) by mouth 2 (two) times a day   Refills: 5     finasteride 5 mg tablet   Commonly known as: PROSCAR   Take 5 mg by mouth every morning   Refills: 0     fluticasone 50 mcg/act nasal spray   Commonly known as: FLONASE   USE TWO SPRAYS IN EACH NOSTRIL EVERY DAY   Refills: 4     fluticasone-umeclidinium-vilanterol 100-62 5-25 MCG/INH inhaler   Commonly known as: TRELEGY   Inhale 1 puff daily Rinse mouth after use  Refills: 11   What changed: See the new instructions       formoterol 20 MCG/2ML nebulizer solution   Commonly known as: PERFOROMIST   Take 1 vial (20 mcg total) by nebulization 2 (two) times a day   Refills: 0     gabapentin 300 mg capsule   Commonly known as: NEURONTIN   Take 1 capsule (300 mg total) by mouth 3 (three) times a day   Refills: 3     ipratropium-albuterol 0 5-2 5 mg/3 mL nebulizer solution   Commonly known as: DUO-NEB   Take 1 vial (3 mL total) by nebulization 4 (four) times a day   Refills: 0     pantoprazole 40 mg tablet   Commonly known as: PROTONIX   Take 1 tablet (40 mg total) by mouth 2 (two) times a day   Refills: 5     polyethylene glycol 4000 mL solution   Commonly known as: GOLYTELY   Take 4,000 mL by mouth once for 1 dose Drink over 3-4 hours starting at 4 PM the evening before your procedure   Refills: 0     ranitidine 150 mg tablet   Commonly known as: ZANTAC   Take 1 tablet (150 mg total) by mouth daily   Refills: 3     tamsulosin 0 4 mg   Commonly known as: FLOMAX   TAKE ONE CAPSULE BY MOUTH ONE TIME DAILY   Refills: 3     ZEMAIRA infusion   Generic drug: alpha1-proteinase inhibitor   Once a week-   Refills: 0     zolpidem 10 mg tablet   Commonly known as: AMBIEN   TAKE ONE TABLET BY MOUTH NIGHTLY AT BEDTIME   Refills: 5      Allergies  Allergies   Allergen Reactions    Chantix [Varenicline]      Caused prostate infection     Diet restrictions: none  Activity restrictions: none  Code Status: Level 1 - Full Code    Discharge Condition: stable    Discharge  Statement   I spent 30 minutes discharging the patient  This time was spent on the day of discharge  I had direct contact with the patient on the day of discharge  Additional documentation is required if more than 30 minutes were spent on discharge

## 2019-11-15 NOTE — TELEPHONE ENCOUNTER
Fabiola Veloz is addressing this, he is to be discharged and have EGD and colonoscopy as an outpatient at [facility] timing is what needs to be determined  thank you

## 2019-11-15 NOTE — SOCIAL WORK
After pt written for discharge to home CM informed by RN that Cordelia IBARRA placed order for nebulizer machine, neb kit and 02 tubing  CM obtained order and sent it to [facility] in [employer] as pt has home oxygen through [facility]  CM instructed [facility] to deliver DME to pts house as he was being discharged  Pt had informed CM he had a nebulizer  DME representative will contact pt to see if he needs a nebulizer

## 2019-11-15 NOTE — TELEPHONE ENCOUNTER
Spoke with pt, he stated he was just discharged from hospital, meds are not ready at pharmacy  He is not comfortable breathing  Pt was advised to reschedule procedures 11/18 to work on respiratory issues   Pt was in agreement, procedures have been moved to 12/19/19 [address] [place] [address] with Dr Clemente Rahman

## 2019-11-15 NOTE — RESPIRATORY THERAPY NOTE
Home Oxygen Qualifying Test       Patient name: Lakshmi Corona        : 1945   Date of Test:  November 15, 2019  Diagnosis:      Home Oxygen Test:    **Medicare Guidelines require item(s) 1-5 on all ambulatory patients or 1 and 2 on non-ambulatory patients  1   Baseline SPO2 on Room Air at rest  86%  2   SPO2 on Oxygen at rest  95% at a liter flow of 3 lpm     4   SPO2 during exercise on Oxygen  *93% a liter flow of  3lpm     5   Exercise performed:          walking          [x]  Supplemental Home Oxygen is indicated  []  Client does not qualify for home oxygen  Respiratory Additional Notes- patient ambulated in hallways on oxygen    Gorge Monroy, RT

## 2019-11-15 NOTE — PROGRESS NOTES
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:  -Ratio 45% / FEV1 44%   -is normally on weekly replacement of alpha 1 proteinase inhibitor   -transitioned to peroformist / Eileen Garcia 11/12 > this will be a permanent change provided we can get this covered through DME> we have re  faxed to [facility] Paula Francis  -stable for d/c > can place on 40 mg prednisone daily until follow up   -cough is wheezing precipitant  -duoneb Q 6 hours and Q 2 prn  -Patient has scheduled follow up w/ me on Friday Nov 29 @ 0830     Acute on Chronic Hypoxemic Respiratory Failure  -stabilized, remains on 2 L NC   -chronically on 2-3 L NC /   -recurrent dyspnea on ambulation     Recurrent Diarrhea:  -pt was scheduled for colonoscopy Monday  -primary team managing / arranging  -will perform in outpatient setting     Secondary Pulmonary Hypertension:  -likely both group 2,3 in setting of severe emphysematous COPD and / chronic diastolic dysfunction per echo 7/2019  -PAP 51 MMHG  -unlikely significant benefit from [address] and or treatment given secondary to underlying pathology     Pulmonary Nodules:  -new RUL nodule per CXR finding on 11/10  -Hx of CAROLYN nodule on most recent CT October 2019  -PA lateral w/ improved nodularity w/ suggestion of inflammatory nodule  -will continue to follow up in outpatient setting    F/U needs:  -f/u CXR for full clearance of nodule  -maint on 40 mg prednisone till f/u then wean as tolerated  -f/u on coverage for perforomist and pulmicort  -f/u for respiratory scheduling of CPFT for parameters for candidacy for endobronchial valve therapy    -cont 2-3 L NC  -f/u results colonoscopy            ______________________________________________________________________    Subjective: Pt seen and examined at bedside  No new complaints  Remains dyspneic at baseline  Tele Events:     Vitals:   Temp:  [97 8 °F (36 6 °C)-98 7 °F (37 1 °C)] 98 1 °F (36 7 °C)  HR:  [64-75] 64  Resp:  [20-22] 20  BP: (148-159)/(78-86) 156/79  Weight (last 2 days)     None        Oxygen Therapy  SpO2: 96 %  O2 Flow Rate (L/min): 2 L/min    IV Infusions:       Nutrition:        Diet Orders   (From admission, onward)             Start     Ordered    11/11/19 1529  Diet Regular; Regular House  Diet effective now     Comments:  *If patient is having a stress test, no caffeine, decaf, or chocolate is to be given*   Question Answer Comment   Diet Type Regular    Regular Regular House    RD to adjust diet per protocol? Yes        11/11/19 1528    11/11/19 0813  Room Service  Once     Question:  Type of Service  Answer:  Room Service - Appropriate with Assistance    11/11/19 0813                Ins/Outs:   I/O       11/13 0701 - 11/14 0700 11/14 0701 - 11/15 0700 11/15 0701 - 11/16 0700    P  O  720 240     Total Intake(mL/kg) 720 (8 2) 240 (2 7)     Urine (mL/kg/hr) 1000 (0 5) 800 (0 4) 400 (0 9)    Stool       Total Output 1000 800 400    Net -280 -560 -400                 Lines/Drains:  Invasive Devices     Peripheral Intravenous Line            Peripheral IV 11/11/19 Right Forearm 3 days                 Active medications:  Scheduled Meds:  Current Facility-Administered Medications:  amLODIPine 10 mg Oral Daily Kassidy Swanson MD   aspirin 81 mg Oral Daily TOPHER Santiago   budesonide 0 5 mg Nebulization Q12H Gracy Hsieh PA-C   enoxaparin 40 mg Subcutaneous Q24H [facility] Milana Cornejo DO   famotidine 20 mg Oral Daily Kassidy Swanson MD   finasteride 5 mg Oral QAM Kassidy Swanson MD   fluticasone 2 spray Each Nare Daily Kassidy Swanson MD   formoterol 20 mcg Nebulization Q12H Gracy Hsieh PA-C   gabapentin 300 mg Oral TID Josue Carter MD   ipratropium-albuterol 3 mL Nebulization Q2H PRN Josue Carter MD   ipratropium-albuterol 3 mL Nebulization Q6H Maria Victoria Verde DO   methylPREDNISolone sodium succinate 40 mg Intravenous Q12H [facility] Maria Victoria Verde DO   pantoprazole 40 mg Oral BID AC Josue Carter MD   tamsulosin 0 4 mg Oral Daily Josue Carter MD   zolpidem 10 mg Oral HS Josue Carter MD     PRN Meds:  ipratropium-albuterol 3 mL Q2H PRN     ____________________________________________________________________      Physical Exam   Constitutional: He is oriented to person, place, and time  He appears well-developed  HENT:   Head: Normocephalic and atraumatic  Eyes: Pupils are equal, round, and reactive to light  Conjunctivae are normal    Neck: Normal range of motion  Neck supple  Cardiovascular: Normal rate, regular rhythm and normal heart sounds  Exam reveals no gallop and no friction rub  No murmur heard  Pulmonary/Chest: Effort normal  No accessory muscle usage  No tachypnea  No respiratory distress  He has decreased breath sounds in the right upper field, the right middle field, the right lower field, the left upper field, the left middle field and the left lower field  He has no wheezes  He has no rhonchi  He has no rales  He exhibits no tenderness  Moderately decreased breath sounds    No wheezing at baseline    Wheezing continues to be precipitated w/ cough    Currently on 2 L NC    Abdominal: Soft  Bowel sounds are normal    Musculoskeletal: Normal range of motion  He exhibits no edema  Neurological: He is alert and oriented to person, place, and time  Skin: Skin is warm and dry     Psychiatric: He has a normal mood and affect            ____________________________________________________________________    Labs:   CBC: Results from last 7 days   Lab Units 11/14/19  0539 11/11/19  0309 11/10/19  2031   WBC Thousand/uL 8 16 7 23 8 44   HEMOGLOBIN g/dL 13 7 14 3 15 6   HEMATOCRIT % 40 7 42 3 45 9 MCV fL 96 95 95   PLATELETS Thousands/uL 171 180 206     CMP: Results from last 7 days   Lab Units 11/15/19  0514 11/14/19  0539 11/13/19  0941 11/11/19  0309   POTASSIUM mmol/L 4 4 4 2 4 3 4 1   CHLORIDE mmol/L 102 104 102 103   CO2 mmol/L 26 28 26 24   BUN mg/dL 23 22 23 14   CREATININE mg/dL 0 96 0 97 1 05 1 35*   CALCIUM mg/dL 8 5 8 5 9 0 8 7   AST U/L  --   --   --  25   ALT U/L  --   --   --  37   ALK PHOS U/L  --   --   --  78   EGFR ml/min/1 73sq m 77 76 69 51     No components found for: ABG    Magnesium:   Results from last 7 days   Lab Units 11/14/19  0539   MAGNESIUM mg/dL 2 1     Phosphorous:   Results from last 7 days   Lab Units 11/14/19  0539   PHOSPHORUS mg/dL 3 3     Troponin:   Results from last 7 days   Lab Units 11/11/19  0748 11/11/19  0309   TROPONIN I ng/mL 0 05* 0 07*     PT/INR:   Results from last 7 days   Lab Units 11/10/19  2031   PTT seconds 26   INR  0 99     Lactic Acid:     BNP:   Results from last 7 days   Lab Units 11/10/19  2032   NT-PRO BNP pg/mL 170     TSH:     Procalcitonin: Invalid input(s): PROCALCITONIN      Imaging:       Xr Chest Pa & Lateral    Result Date: 11/14/2019  Impression Stable right upper lobe opacity which may be inflammatory in origin  Continued surveillance recommended  Follow-up chest x-ray recommended in one months time  The study was marked in EPIC for significant notification  Workstation performed: [id number]     Xr Chest Pa & Lateral    Result Date: 7/30/2019  Impression No acute cardiopulmonary disease  COPD  Workstation performed: [id number]     Xr Chest Pa & Lateral    Result Date: 7/23/2019  Impression No acute cardiopulmonary disease  Chronic changes  Workstation performed: [id number]     Xr Chest Pa & Lateral    Result Date: 7/2/2019  Impression Discoid atelectasis versus scarring right upper lobe   Workstation performed: [id number]      Ct Chest With Contrast    Result Date: 10/2/2019  Narrative CT CHEST WITH IV CONTRAST INDICATION: R91 8: Other nonspecific abnormal finding of lung field  COMPARISON:  06/30/2019, 10/11/2018 TECHNIQUE: CT examination of the chest was performed  Axial, sagittal, and coronal 2D reformatted images were created from the source data and submitted for interpretation  Radiation dose length product (DLP) for this visit:  392 mGy-cm   This examination, like all CT scans performed in the [facility], was performed utilizing techniques to minimize radiation dose exposure, including the use of iterative reconstruction and automated exposure control  IV Contrast:  100 mL of iohexol (OMNIPAQUE) FINDINGS: LUNGS:  Severe emphysematous changes are present  A bulla is seen at the right lung base  New 4 mm left upper lobe nodule, image 14 series 3  No additional new nodules  Chronic airspace disease in the right lower lobe  Stable soft tissue density at the bifurcation of the left lower lobe pulmonary artery  PLEURA:  No pleural effusion  Stable pleural-based calcifications on the right  HEART/GREAT VESSELS:  Unremarkable for patient's age  MEDIASTINUM AND MIKE:  Hiatal hernia  There is some thickening of the distal esophagus    CHEST WALL AND LOWER NECK:   Unremarkable  VISUALIZED STRUCTURES IN THE UPPER ABDOMEN:  There is fatty change in the liver  Cyst in the upper pole of the left kidney    OSSEOUS STRUCTURES:  No acute fracture or destructive osseous lesion  Impression 1  Severe emphysema  2   New 4 mm nodule in the left upper lobe  Recommend CT scan of the chest in 12 months time for follow-up in this high risk patient based on Fleischner Society guidelines  3   Stable soft tissue density at the bifurcation of the left lower lobe pulmonary artery  4   Hiatal hernia with stable thickening of the distal esophagus  This may be on the basis of reflux  The study was marked in EPIC for significant notification   Workstation performed: [id number]          Micro: Lab Results   Component Value Date BLOODCX No Growth After 5 Days  12/29/2017    BLOODCX No Growth After 5 Days   12/29/2017    WOUNDCULT 3+ Growth of Staphylococcus aureus (A) 06/12/2019    MRSACULTURE  11/11/2019     No Methicillin Resistant Staphlyococcus aureus (MRSA) isolated            Invalid input(s): LEGIONELLAURINARYANTIGEN        Code Status: Level 1 - Full Code

## 2019-11-16 ENCOUNTER — TELEPHONE (OUTPATIENT)
Dept: OTHER | Facility: HOSPITAL | Age: 74
End: 2019-11-16

## 2019-11-16 NOTE — TELEPHONE ENCOUNTER
Patient called hospital because he was having difficulty getting the nebulizer treatments that were prescribed to him at discharge  Per patient, they are not able to give him this medication until Monday  I am able to see on his chart that these were indeed ordered  He notes that he is currently on 3 5 L of oxygen at home  Discussed with patient that if he continues to have trouble breathing he can increase oxygen but if he feels that he is struggling to breathe he should report to the ER  He has been taking his albuterol every 4 hours  Patient to follow up with pharmacy on Monday and he continues to have an issue he will call us back

## 2019-11-17 RX ORDER — SODIUM CHLORIDE, SODIUM LACTATE, POTASSIUM CHLORIDE, CALCIUM CHLORIDE 600; 310; 30; 20 MG/100ML; MG/100ML; MG/100ML; MG/100ML
125 INJECTION, SOLUTION INTRAVENOUS CONTINUOUS
Status: CANCELLED | OUTPATIENT
Start: 2020-01-07

## 2019-11-18 ENCOUNTER — HOSPITAL ENCOUNTER (OUTPATIENT)
Dept: PERIOP | Facility: HOSPITAL | Age: 74
Setting detail: OUTPATIENT SURGERY
Discharge: HOME | End: 2019-11-18

## 2019-11-19 ENCOUNTER — PATIENT OUTREACH (OUTPATIENT)
Dept: FAMILY MEDICINE CLINIC | Facility: CLINIC | Age: 74
End: 2019-11-19

## 2019-11-19 ENCOUNTER — TRANSITIONAL CARE MANAGEMENT (OUTPATIENT)
Dept: FAMILY MEDICINE CLINIC | Facility: CLINIC | Age: 74
End: 2019-11-19

## 2019-11-19 ENCOUNTER — OFFICE VISIT (OUTPATIENT)
Dept: FAMILY MEDICINE CLINIC | Facility: CLINIC | Age: 74
End: 2019-11-19
Payer: MEDICARE

## 2019-11-19 VITALS
HEIGHT: 77 IN | HEART RATE: 90 BPM | DIASTOLIC BLOOD PRESSURE: 70 MMHG | BODY MASS INDEX: 22.56 KG/M2 | SYSTOLIC BLOOD PRESSURE: 158 MMHG | TEMPERATURE: 97 F | OXYGEN SATURATION: 96 % | WEIGHT: 191.1 LBS | RESPIRATION RATE: 16 BRPM

## 2019-11-19 DIAGNOSIS — J43.2 CENTRILOBULAR EMPHYSEMA (HCC): ICD-10-CM

## 2019-11-19 DIAGNOSIS — E88.01 AAT (ALPHA-1-ANTITRYPSIN) DEFICIENCY (HCC): Primary | ICD-10-CM

## 2019-11-19 DIAGNOSIS — K21.00 GASTROESOPHAGEAL REFLUX DISEASE WITH ESOPHAGITIS: ICD-10-CM

## 2019-11-19 PROCEDURE — 99495 TRANSJ CARE MGMT MOD F2F 14D: CPT | Performed by: FAMILY MEDICINE

## 2019-11-19 NOTE — PROGRESS NOTES
Assessment/Plan:    No problem-specific Assessment & Plan notes found for this encounter  Diagnoses and all orders for this visit:    AAT (alpha-1-antitrypsin) deficiency ([facility] [address] )  Comments: Will contact pulmonology to clarify maintenance medications  Follow up pulmonology  Gastroesophageal reflux disease with esophagitis  Comments:  Patient for GI follow-up for GERD and diarrhea  May need colonoscopy  Continue meds  Centrilobular emphysema ([facility] [address] )  Comments:  Finishing steroid treatment but may need prolonged  Will defer to pulmonology  Subjective:      Patient ID: Brian Garcia is a 75 y o  male  HPI patient presents for hospital follow-up from dyspnea and COPD exacerbation  In addition he was evaluated for his chronic loose stools  He has alpha-1 antitrypsin deficiency  His stools have improved although he is not taking the Colestid as recommended since he believes is no longer in stock  In addition he has not taken inhaled budesonide and formoterol sensitive not been delivered to his house ship  He has a days of prednisone though the discharge instructions indicated that he should be on prednisone till evaluated by pulmonology  His pulmonology evaluations scheduled in December  Also in mid December he has a vascular procedure as well as colonoscopy  [facility] He denies any chest pain although he has had a fair amount of gastroesophageal reflux  The following portions of the patient's history were reviewed and updated as appropriate: allergies, current medications, past family history, past medical history, past social history, past surgical history and problem list     Review of Systems  patient denies URI symptoms  No fever, sweats or chills  Dyspnea baseline  No chest pain  Positive heartburn or reflux  No vomiting  No dysphagia or odynophagia  Few loose stools but improved from before  No abdominal pain    Peripheral edema under control with Jobst stockings  Objective:      /70 (BP Location: Left arm, Patient Position: Sitting, Cuff Size: Standard)   Pulse 90   Temp (!) 97 °F (36 1 °C) (Tympanic)   Resp 16   Ht 6' 5" (1 956 m)   Wt 86 7 kg (191 lb 1 6 oz)   SpO2 96%   BMI 22 66 kg/m²          Physical Exam  general cooperative no acute distress  No conversational dyspnea  Oxygen in place  Lungs with slight decreased breath sounds at the bases but no audible wheezing  Heart regular rate and rhythm without rubs, gallops or murmurs  Abdomen BS positive, soft and nontender    No peripheral edema noted

## 2019-11-19 NOTE — PROGRESS NOTES
Spoke with   Gil Daniel per Dr Lisa Hernandez request  Reviewed medications  Colestid is expected to arrive to patients home today via [employer]  Pt is  taking Trelegy and is using ipratropium through his nebulizer  Pt is unaware of any other medications ( budesomide and formoterol)  Message sent via [employer] to [facility] pulmonology PA for clarification  CM will notify patient after clarification received

## 2019-11-20 ENCOUNTER — APPOINTMENT (OUTPATIENT)
Dept: WOUND CARE | Facility: HOSPITAL | Age: 74
End: 2019-11-20
Payer: MEDICARE

## 2019-11-20 PROCEDURE — 99213 OFFICE O/P EST LOW 20 MIN: CPT

## 2019-11-21 ENCOUNTER — PATIENT OUTREACH (OUTPATIENT)
Dept: FAMILY MEDICINE CLINIC | Facility: CLINIC | Age: 74
End: 2019-11-21

## 2019-11-21 DIAGNOSIS — N40.1 BENIGN PROSTATIC HYPERPLASIA WITH URINARY FREQUENCY: ICD-10-CM

## 2019-11-21 DIAGNOSIS — R35.0 BENIGN PROSTATIC HYPERPLASIA WITH URINARY FREQUENCY: ICD-10-CM

## 2019-11-21 DIAGNOSIS — J96.11 CHRONIC RESPIRATORY FAILURE WITH HYPOXIA (HCC): Primary | ICD-10-CM

## 2019-11-21 NOTE — PROGRESS NOTES
Call placed to patient as follow up to discussion regarding medications  Requested return call  Contact information provided

## 2019-11-22 ENCOUNTER — PATIENT OUTREACH (OUTPATIENT)
Dept: FAMILY MEDICINE CLINIC | Facility: CLINIC | Age: 74
End: 2019-11-22

## 2019-11-22 DIAGNOSIS — J96.11 CHRONIC RESPIRATORY FAILURE WITH HYPOXIA (HCC): Primary | ICD-10-CM

## 2019-11-22 DIAGNOSIS — J43.2 CENTRILOBULAR EMPHYSEMA (HCC): ICD-10-CM

## 2019-11-22 DIAGNOSIS — J96.21 ACUTE ON CHRONIC RESPIRATORY FAILURE WITH HYPOXIA (HCC): ICD-10-CM

## 2019-11-22 RX ORDER — TAMSULOSIN HYDROCHLORIDE 0.4 MG/1
CAPSULE ORAL
Qty: 30 CAPSULE | Refills: 2 | Status: SHIPPED | OUTPATIENT
Start: 2019-11-22 | End: 2020-04-10

## 2019-11-22 NOTE — PROGRESS NOTES
Returned patients call  Pt states his breathing is worse than baseline but not as bad as when he has to go to ED  Reviewed medications  States perforomist and pulmicort have arrived  Instructed to take them 2 x day  Pt states he is taking the albuterol 4 x day  Advised to go to ED if breathing becomes worse  Pt states he is trying to avoid the hospital because they will put him on bipap  Pt states his oxygen saturation is in the 90's  Again reinforced need to go to ED if breathing becomes worse  Tiger text message sent to Bronwyn Mercedes pulmonology PA advising of above and to request outreach to patient on  Monday

## 2019-11-25 ENCOUNTER — TELEPHONE (OUTPATIENT)
Dept: PULMONOLOGY | Facility: MEDICAL CENTER | Age: 74
End: 2019-11-25

## 2019-11-25 ENCOUNTER — PATIENT OUTREACH (OUTPATIENT)
Dept: OTHER | Facility: HOSPITAL | Age: 74
End: 2019-11-25

## 2019-11-25 DIAGNOSIS — J44.1 COPD EXACERBATION (HCC): Primary | ICD-10-CM

## 2019-11-25 RX ORDER — PREDNISONE 20 MG/1
20 TABLET ORAL DAILY
Qty: 40 TABLET | Refills: 0 | Status: SHIPPED | OUTPATIENT
Start: 2019-11-25 | End: 2019-12-09 | Stop reason: HOSPADM

## 2019-11-25 NOTE — PROGRESS NOTES
[facility]  Patient was tachypnec today and he refused to go to ER  He finished prednisone 40 mg daily 2 days ago  I am ordering 20 mg tabs of prednisone  He will take 2 pills a day daily for 5 days and then a 20 mg pill until his next office appointment with Gerri Yost on December 13th

## 2019-11-25 NOTE — PROGRESS NOTES
Patient was contacted as per referral to assist with education, respiratory medication, breathing techniques  He is currently receiving home infusion and he feels extreme SOB with respiratory rate up to 50  The home infusion RN, Jeniffer Wilson called Pulmonary to alert and request additional Prednisone  Trygve Boast does not want to be admitted to the hospital  Pulse ox is 93%  RN asked that he be contacted at a later time  RT gave contact name and number  Will check tomorrow

## 2019-11-25 NOTE — TELEPHONE ENCOUNTER
Visiting nurse called   patient is having trouble breathing he does not want to go to the ER, Visiting Nurse would like to know if you could call in prednisone for him    Please call Bhanu the visiting nurse to let her know she can go pick it up  [phone]

## 2019-11-27 ENCOUNTER — OFFICE VISIT (OUTPATIENT)
Dept: HEMATOLOGY ONCOLOGY | Facility: MEDICAL CENTER | Age: 74
End: 2019-11-27
Payer: MEDICARE

## 2019-11-27 VITALS
WEIGHT: 200 LBS | DIASTOLIC BLOOD PRESSURE: 82 MMHG | OXYGEN SATURATION: 97 % | SYSTOLIC BLOOD PRESSURE: 164 MMHG | HEART RATE: 80 BPM | RESPIRATION RATE: 20 BRPM | HEIGHT: 77 IN | TEMPERATURE: 97.9 F | BODY MASS INDEX: 23.62 KG/M2

## 2019-11-27 DIAGNOSIS — E88.01 AAT (ALPHA-1-ANTITRYPSIN) DEFICIENCY (HCC): ICD-10-CM

## 2019-11-27 DIAGNOSIS — R91.8 LUNG NODULES: Primary | ICD-10-CM

## 2019-11-27 PROCEDURE — 99213 OFFICE O/P EST LOW 20 MIN: CPT | Performed by: INTERNAL MEDICINE

## 2019-11-27 RX ORDER — FORMOTEROL FUMARATE 20 UG/2ML
20 SOLUTION RESPIRATORY (INHALATION) 2 TIMES DAILY
COMMUNITY
End: 2020-09-24 | Stop reason: SDUPTHER

## 2019-11-27 NOTE — PROGRESS NOTES
Charmayne G[facility]  1945  [address] [facility] [phone]    DISCUSSION  SUMMARY:    50-year-old male recently  the hospital with chest pain  Workup demonstrated an acute pulmonary emboli within the distal left main pulmonary artery and proximal left lower lobe segmental branches  Issues:    Pulmonary embolism  Follow-up CT Ace demonstrated resolution  Clinically patient is about the same as before, shortness of breath and dyspnea on exertion is the same (other pulmonary issues)  The plan is for patient to continue off of anticoagulation (completed 6 months of Eliquis)  We rediscussed what to monitor for in regard to acute shortness of breath, chest pain, cough, hemoptysis, and acute panic attack etc   Patient understands that if he should suffer another thrombotic episode, he will likely need anticoagulation for life  Pulmonary nodules, emphysema, alpha-1 antitrypsin deficiency  Surveillance is ongoing, patient follows with pulmonary     Patient is on weekly IV alpha 1 proteinase inhibitor  Left lower extremity swelling with pain  The lower extremity swelling is less/better than before  Patient underwent vein stripping many years ago  Recent Dopplers did not demonstrate any evidence of a deep vein thrombosis  Surveillance continues  Rectal bleeding - not an issue recently  The recent CBC results demonstrated a good hemoglobin level  Patient has a history of hemorrhoids  Mr  [facility] will continue to monitor  Return to Hematology is on a prn basis but Mr  [facility] knows to call if he has any other hematology questions or concerns  Carefully review your medication list and verify that the list is accurate and up-to-date   Please call the hematology/oncology office if there are medications missing from the list, medications on the list that you are not currently taking or if there is a dosage or instruction that is different from how you're taking that medication  Patient goals and areas of care: Follow up with PCP, pulmonary  Barriers to care:  None  Patient is able to self-care   _____________________________________________________________________________________    Chief Complaint   Patient presents with    Follow-up     History of PE, status post anticoagulation for 6 months     History of Present Illness:    16-year-old male recently admitted to the hospital with chest pain  Workup demonstrated PE  Patient was started on anticoagulation - completed 6 months of Eliquis (August 2018)  Patient returns for follow-up  Patient states feeling +/-, breathing has been a little bit more difficult over the past few months  As before, patient requires oxygen 24/7  No recent chest pressure or pain  Cough is the same as before, no sputum or hemoptysis  No fevers or infections recently  Appetite is okay, weight is stable  Activities are limited but about the same as before  Prior history of hemorrhoids, no recent GI bleeding  Review of Systems   Constitutional: Negative  HENT: Negative  Eyes: Negative  Respiratory: Positive for cough and shortness of breath  Cardiovascular: Negative  Gastrointestinal: Negative for blood in stool  Endocrine: Negative  Genitourinary: Positive for difficulty urinating and urgency  Musculoskeletal: Negative  Skin: Negative  Allergic/Immunologic: Negative  Neurological: Negative  Hematological: Does not bruise/bleed easily  Bruises easily   Psychiatric/Behavioral: Negative  All other systems reviewed and are negative       Patient Active Problem List   Diagnosis    AAT (alpha-1-antitrypsin) deficiency (HCC)    Gastroesophageal reflux disease    Causalgia of lower limb    Essential hypertension    Acute on chronic respiratory failure with hypoxia (HCC)    BPH (benign prostatic hyperplasia)    Liver disease    Lung nodules    Former smoker    Chest pain at rest    Chronic respiratory failure with hypoxia (HCC)    Centrilobular emphysema (HCC)    Lightheadedness    Weakness generalized    Elevated PSA    Neuropathy    Skin lesion of right lower extremity    Herpes labialis    Pain and swelling of left lower extremity    Chronic venous insufficiency    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 (HCC)    Insomnia    Pulmonary hypertension (HCC)    Cerumen impaction     Past Medical History:   Diagnosis Date    Anesthesia complication     Difficult to wake up    Arthritis     BPH (benign prostatic hyperplasia)     urinary frequency    Cancer (HCC)     basal cell neck, face    COPD (chronic obstructive pulmonary disease) (HCC)     Full dentures     Hiatal hernia     History of methicillin resistant staphylococcus aureus (MRSA)     10/11/2018 MRSA (nares) positive    Hypertension     controlled    Irritable bowel syndrome     Kidney stone     at least 7 episodes    Liver disease     Alpha 1- enzyme deficiency - diagnosed 2002  has been on weekly replacement therapy since then    Pulmonary emphysema ([facility] [address] )     1/25/15  FEV1 - 2 45 liters or 59% of predicted    RSV infection 12/2017    Wears glasses     for driving only     Past Surgical History:   Procedure Laterality Date    BACK SURGERY  2008    discectomy    COLONOSCOPY      COLONOSCOPY N/A 3/10/2017    Procedure: Dontrell Proctor;  Surgeon: Reyna Salinas MD;  Location: [facility] GI LAB; Service:    Juanito Zimmerman      removal of kidney stones    ESOPHAGOGASTRODUODENOSCOPY N/A 3/10/2017    Procedure: ESOPHAGOGASTRODUODENOSCOPY (EGD); Surgeon: Reyna Salinas MD;  Location: [facility] GI LAB; Service:     LITHOTRIPSY      [place] ESOPHAGOGASTRODUODENOSCOPY TRANSORAL DIAGNOSTIC N/A 11/20/2018    Procedure: ESOPHAGOGASTRODUODENOSCOPY (EGD); Surgeon: Reyna Salinas MD;  Location: [facility] GI LAB;   Service: Gastroenterology    TONSILLECTOMY      VEIN LIGATION AND STRIPPING Bilateral     18's     Family History   Problem Relation Age of Onset    Emphysema Mother         never smoked    Emphysema Father     Cancer Brother         colon    Colon cancer Brother     Ulcerative colitis Family     Liver disease Family      Social History     Socioeconomic History    Marital status:       Spouse name: Not on file    Number of children: Not on file    Years of education: Not on file    Highest education level: Not on file   Occupational History    Not on file   Social Needs    Financial resource strain: Not on file    Food insecurity:     Worry: Not on file     Inability: Not on file    Transportation needs:     Medical: Not on file     Non-medical: Not on file   Tobacco Use    Smoking status: Former Smoker     Packs/day: 1 00     Years: 60 00     Pack years: 60 00     Last attempt to quit: 2017     Years since quittin 2    Smokeless tobacco: Never Used    Tobacco comment: quit in 2017   Substance and Sexual Activity    Alcohol use: Not Currently     Comment: stopped in     Drug use: No    Sexual activity: Not on file   Lifestyle    Physical activity:     Days per week: Not on file     Minutes per session: Not on file    Stress: Not on file   Relationships    Social connections:     Talks on phone: Not on file     Gets together: Not on file     Attends Hindu service: Not on file     Active member of club or organization: Not on file     Attends meetings of clubs or organizations: Not on file     Relationship status: Not on file    Intimate partner violence:     Fear of current or ex partner: Not on file     Emotionally abused: Not on file     Physically abused: Not on file     Forced sexual activity: Not on file   Other Topics Concern    Not on file   Social History Narrative    Not on file       Current Outpatient Medications:     albuterol (2 5 mg/3 mL) 0 083 % nebulizer solution, Inhale 1 each every 4 (four) hours as needed, Disp: , Rfl:     albuterol (PROVENTIL HFA,VENTOLIN HFA) 90 mcg/act inhaler, Inhale 2 puffs 4 (four) times a day, Disp: 1 Inhaler, Rfl: 5    amLODIPine (NORVASC) 10 mg tablet, TAKE ONE TABLET BY MOUTH ONE TIME DAILY , Disp: 30 tablet, Rfl: 4    colestipol (COLESTID) 1 g tablet, Take 1 tablet (1 g total) by mouth 2 (two) times a day, Disp: 60 tablet, Rfl: 5    finasteride (PROSCAR) 5 mg tablet, Take 5 mg by mouth every morning  , Disp: , Rfl:     fluticasone (FLONASE) 50 mcg/act nasal spray, USE TWO SPRAYS IN EACH NOSTRIL EVERY DAY , Disp: 16 g, Rfl: 4    formoterol (PERFOROMIST) 20 MCG/2ML nebulizer solution, Take 20 mcg by nebulization 2 (two) times a day, Disp: , Rfl:     gabapentin (NEURONTIN) 300 mg capsule, Take 1 capsule (300 mg total) by mouth 3 (three) times a day, Disp: 90 capsule, Rfl: 3    ipratropium-albuterol (DUO-NEB) 0 5-2 5 mg/3 mL nebulizer solution, Take 1 vial (3 mL total) by nebulization 4 (four) times a day, Disp: 120 vial, Rfl: 6    OXYGEN-HELIUM IN, Inhale 2L at rest- 3L with activity, Disp: , Rfl:     pantoprazole (PROTONIX) 40 mg tablet, Take 1 tablet (40 mg total) by mouth 2 (two) times a day, Disp: 60 tablet, Rfl: 5    predniSONE 20 mg tablet, Take 1 tablet (20 mg total) by mouth daily Two pills daily x5 days then 1 pill daily for 30 days, Disp: 40 tablet, Rfl: 0    tamsulosin (FLOMAX) 0 4 mg, TAKE ONE CAPSULE BY MOUTH ONE TIME DAILY , Disp: 30 capsule, Rfl: 2    ZEMAIRA injection, Once a week- , Disp: , Rfl:     zolpidem (AMBIEN) 10 mg tablet, TAKE ONE TABLET BY MOUTH NIGHTLY AT BEDTIME , Disp: 30 tablet, Rfl: 5    bisacodyl (DULCOLAX) 5 mg EC tablet, Take 2 tablets (10 mg total) by mouth once for 1 dose Per office instructions, Disp: 2 tablet, Rfl: 0    budesonide (PULMICORT) 0 5 mg/2 mL nebulizer solution, Take 1 vial (0 5 mg total) by nebulization 2 (two) times a day for 5 days Rinse mouth after use , Disp: 10 vial, Rfl: 0    polyethylene glycol (GOLYTELY) 4000 mL solution, Take 4,000 mL by mouth once for 1 dose Drink over 3-4 hours starting at 4 PM the evening before your procedure, Disp: 4000 mL, Rfl: 0    ranitidine (ZANTAC) 150 mg tablet, Take 1 tablet (150 mg total) by mouth daily (Patient not taking: Reported on 11/11/2019), Disp: 30 tablet, Rfl: 3    Allergies   Allergen Reactions    Chantix [Varenicline]      Caused prostate infection       Vitals:    11/27/19 0845   BP: 164/82   Pulse: 80   Resp: 20   Temp: 97 9 °F (36 6 °C)   SpO2: 97%     Physical Exam   Constitutional: He is oriented to person, place, and time  He appears well-developed and well-nourished  Thin male, mild respiratory distress, nasal cannula O2 in place   HENT:   Head: Normocephalic and atraumatic  Right Ear: External ear normal    Left Ear: External ear normal    Nose: Nose normal    Mouth/Throat: Oropharynx is clear and moist    Eyes: Pupils are equal, round, and reactive to light  Conjunctivae and EOM are normal    Neck: Normal range of motion  Neck supple  Cardiovascular: Normal rate, regular rhythm, normal heart sounds and intact distal pulses  Pulmonary/Chest:   Decreased breath sounds bilaterally, otherwise clear   Abdominal: Soft  Bowel sounds are normal    Musculoskeletal: Normal range of motion  Neurological: He is alert and oriented to person, place, and time  He has normal reflexes  Skin: Skin is warm  Skin with relatively good color, scattered upper extremity ecchymoses, no petechiae   Psychiatric: He has a normal mood and affect   His behavior is normal  Judgment and thought content normal    Extremities:  0-1 +bilateral lower extremity edema, no cords, pulses are 1+  Lymphatics: no adenopathy in the neck, supraclavicular region, axilla and groin bilaterally    Labs    11/15/2019 BUN = 23 creatinine = 0 96  11/14/2019 WBC = 8 16 hemoglobin = 13 7 hematocrit = 41 platelet = 108 neutrophil = 81% lymphocytes = 11% monocyte = 6% immature granulocytes = 2%    10/17/2018 WBC = 8 6 hemoglobin = 13 4 hematocrit = 39 MCV = 95 platelet = 288 neutrophil = 90% lymphocytes = 7% monocyte = 3% BUN = 33 creatinine = 0 94  06/12/2018 WBC = 6 3 hemoglobin = 13 8 hematocrit = 41 6 platelet = 952 neutrophil = 56%  04/04/2018 Alpha 1 antitrypsin = 74 = decreased   4/4/18 WBC = 11 hemoglobin = 13 2 hematocrit = 38 7 platelet = 785 neutrophil = 89%  2/18/18 BUN = 18 creatinine = 1 17 WBC = 5 9 hemoglobin = 12 1 hematocrit = 36 platelet = 206  7/15/34 alpha-fetoprotein = 4 8    Imaging    05/21/2019 vascular lower limb venous duplex study    RIGHT LOWER LIMB:  No evidence of acute or chronic deep vein thrombosis  No evidence of superficial thrombophlebitis noted  Doppler evaluation shows a normal response to augmentation maneuvers  Popliteal, posterior tibial and anterior tibial arterial Doppler waveforms are  triphasic  LEFT LOWER LIMB:  No evidence of acute or chronic deep vein thrombosis  No evidence of superficial thrombophlebitis noted  Doppler evaluation shows a normal response to augmentation maneuvers  Popliteal, posterior tibial and anterior tibial arterial Doppler waveforms are  triphasic      04/11/2019 CTA chest PE study    1  No acute pulmonary embolus is seen  Slight eccentric filling defect along the lateral wall of the left lower pulmonary artery, series 2 image 121, appears unchanged from 10/11/2018, though new from 6/12/2018  This likely represents chronic embolus  2   Minimal change in scattered subcentimeter lung nodules as above  Continued CT follow-up suggested  10/11/2018 CTA chest and CT scan abdomen/pelvis     No evidence of pulmonary embolus  Several small pulmonary nodules  New left upper lobe pulmonary nodule  Follow-up CT scan in 3-6 months is recommended  06/12/2018 CTA/chest    1  No pulmonary embolism  2   Spiculated right upper lobe nodule with mean diameter of 8 mm is unchanged in size  PET/CT in February was concerning for malignancy  Correlate with tissue sampling of medullary performed  3   Multiple new and increasing bilateral pulmonary nodules demonstrated on the order of 3 to 4 mm in size  Differential considerations include metastasis as well as acute infectious or inflammatory processes  2/14/18 CTA chest PE study     1   Acute pulmonary embolus in the distal left main pulmonary artery and proximal left lower lobe segmental branches  2   Moderate panlobular emphysema  3   Stable right upper and left lower pulmonary nodules measuring 1 2 x 1 3 cm  PET/CT was performed, based on current Fleischner Society 2017 Guidelines on incidental pulmonary nodule   Tissue sampling of the right upper lobe hypermetabolic nodule is   planned   3 month follow-up of left lower lobe pulmonary nodule as previously recommended      2/12/18 PET/CT     1   1 2 x 0 8 cm right upper lung nodule demonstrates hypermetabolism and is most concerning for malignancy   Tissue sampling recommended  2   1 3 x 0 6 cm left lower lung nodule does not demonstrate significant hypermetabolism   In the absence of tissue sampling, 3 month CT follow-up is recommended to exclude a low-grade neoplasm  3   No hypermetabolic metastases visualized

## 2019-11-29 DIAGNOSIS — J44.9 CHRONIC OBSTRUCTIVE PULMONARY DISEASE, UNSPECIFIED COPD TYPE (HCC): ICD-10-CM

## 2019-12-02 ENCOUNTER — PATIENT OUTREACH (OUTPATIENT)
Dept: OTHER | Facility: HOSPITAL | Age: 74
End: 2019-12-02

## 2019-12-02 NOTE — PROGRESS NOTES
Abelardo was contacted as a follow-up to the last conversation  He is feeling the shortness of breath, mostly during activity  He was relaxed during the conversation and was not struggling at the time  His pulse oximetry is normally in the mid 90's, even when he struggles to breathe  We discussed breathing techniques, pursed lips and diaphragmatic breathing and incorporating slight activity in daily routine  Abelardo finished Pulm Rehab before his hospitalization and understands the need to continue exercising  We reviewed the use of all his Respiratory medication (nebulized and inhalers), which he is taking as directed  He is not coughing up phlegm and does not have the need for a flutter valve  Mylene Yoan is concerned about the on-going loss of "near" vision, feeling as though it is getting worse  He is currently on Prednisone and is wondering about the long term effects  He will see Dr Perry Groves tomorrow and will discuss with him  Rosemarie Pac He is also scheduled for a Pulmonary appt and Pulm diagnostic testing in 2 weeks  RT number given to Abelardo for contact if needed

## 2019-12-03 ENCOUNTER — APPOINTMENT (EMERGENCY)
Dept: RADIOLOGY | Facility: HOSPITAL | Age: 74
DRG: 642 | End: 2019-12-03
Payer: MEDICARE

## 2019-12-03 ENCOUNTER — HOSPITAL ENCOUNTER (INPATIENT)
Facility: HOSPITAL | Age: 74
LOS: 5 days | Discharge: NURSING, REHAB OR LONG-TERM CARE | DRG: 642 | End: 2019-12-09
Attending: EMERGENCY MEDICINE | Admitting: FAMILY MEDICINE
Payer: MEDICARE

## 2019-12-03 ENCOUNTER — OFFICE VISIT (OUTPATIENT)
Dept: FAMILY MEDICINE CLINIC | Facility: CLINIC | Age: 74
End: 2019-12-03
Payer: MEDICARE

## 2019-12-03 VITALS
HEIGHT: 77 IN | HEART RATE: 86 BPM | BODY MASS INDEX: 23.6 KG/M2 | SYSTOLIC BLOOD PRESSURE: 148 MMHG | DIASTOLIC BLOOD PRESSURE: 80 MMHG | RESPIRATION RATE: 16 BRPM | WEIGHT: 199.9 LBS | TEMPERATURE: 97.9 F | OXYGEN SATURATION: 94 %

## 2019-12-03 DIAGNOSIS — J96.11 CHRONIC RESPIRATORY FAILURE WITH HYPOXIA (HCC): ICD-10-CM

## 2019-12-03 DIAGNOSIS — J43.2 CENTRILOBULAR EMPHYSEMA (HCC): ICD-10-CM

## 2019-12-03 DIAGNOSIS — J44.1 COPD EXACERBATION (HCC): Primary | ICD-10-CM

## 2019-12-03 DIAGNOSIS — J96.20 ACUTE ON CHRONIC RESPIRATORY FAILURE, UNSPECIFIED WHETHER WITH HYPOXIA OR HYPERCAPNIA (HCC): ICD-10-CM

## 2019-12-03 DIAGNOSIS — J44.1 COPD WITH ACUTE EXACERBATION (HCC): Primary | ICD-10-CM

## 2019-12-03 DIAGNOSIS — E88.01 AAT (ALPHA-1-ANTITRYPSIN) DEFICIENCY (HCC): ICD-10-CM

## 2019-12-03 PROBLEM — R74.01 TRANSAMINITIS: Status: ACTIVE | Noted: 2019-12-03

## 2019-12-03 LAB
ALBUMIN SERPL BCP-MCNC: 3.7 G/DL (ref 3.5–5)
ALP SERPL-CCNC: 68 U/L (ref 46–116)
ALT SERPL W P-5'-P-CCNC: 170 U/L (ref 12–78)
ANION GAP SERPL CALCULATED.3IONS-SCNC: 10 MMOL/L (ref 4–13)
APTT PPP: 23 SECONDS (ref 25–32)
AST SERPL W P-5'-P-CCNC: 49 U/L (ref 5–45)
BASE EXCESS BLDA CALC-SCNC: -2.8 MMOL/L
BASOPHILS # BLD MANUAL: 0 THOUSAND/UL (ref 0–0.1)
BASOPHILS NFR MAR MANUAL: 0 % (ref 0–1)
BILIRUB SERPL-MCNC: 1 MG/DL (ref 0.2–1)
BILIRUB UR QL STRIP: NEGATIVE
BODY TEMPERATURE: 98.5 DEGREES FEHRENHEIT
BUN SERPL-MCNC: 21 MG/DL (ref 5–25)
CALCIUM SERPL-MCNC: 8.8 MG/DL (ref 8.3–10.1)
CHLORIDE SERPL-SCNC: 103 MMOL/L (ref 100–108)
CLARITY UR: CLEAR
CO2 SERPL-SCNC: 26 MMOL/L (ref 21–32)
COLOR UR: YELLOW
CREAT SERPL-MCNC: 1.09 MG/DL (ref 0.6–1.3)
EOSINOPHIL # BLD MANUAL: 0.1 THOUSAND/UL (ref 0–0.4)
EOSINOPHIL NFR BLD MANUAL: 1 % (ref 0–6)
ERYTHROCYTE [DISTWIDTH] IN BLOOD BY AUTOMATED COUNT: 13.2 % (ref 11.6–15.1)
GFR SERPL CREATININE-BSD FRML MDRD: 66 ML/MIN/1.73SQ M
GLUCOSE SERPL-MCNC: 115 MG/DL (ref 65–140)
GLUCOSE UR STRIP-MCNC: ABNORMAL MG/DL
HCO3 BLDA-SCNC: 20.9 MMOL/L (ref 22–28)
HCT VFR BLD AUTO: 43.3 % (ref 36.5–49.3)
HGB BLD-MCNC: 15 G/DL (ref 12–17)
HGB UR QL STRIP.AUTO: NEGATIVE
INR PPP: 0.99 (ref 0.91–1.09)
KETONES UR STRIP-MCNC: NEGATIVE MG/DL
LEUKOCYTE ESTERASE UR QL STRIP: NEGATIVE
LYMPHOCYTES # BLD AUTO: 1.65 THOUSAND/UL (ref 0.6–4.47)
LYMPHOCYTES # BLD AUTO: 16 % (ref 14–44)
MAGNESIUM SERPL-MCNC: 1.9 MG/DL (ref 1.6–2.6)
MCH RBC QN AUTO: 32.9 PG (ref 26.8–34.3)
MCHC RBC AUTO-ENTMCNC: 34.6 G/DL (ref 31.4–37.4)
MCV RBC AUTO: 95 FL (ref 82–98)
MONOCYTES # BLD AUTO: 0.72 THOUSAND/UL (ref 0–1.22)
MONOCYTES NFR BLD: 7 % (ref 4–12)
NASAL CANNULA: 3
NEUTROPHILS # BLD MANUAL: 7.84 THOUSAND/UL (ref 1.85–7.62)
NEUTS BAND NFR BLD MANUAL: 1 % (ref 0–8)
NEUTS SEG NFR BLD AUTO: 75 % (ref 43–75)
NITRITE UR QL STRIP: NEGATIVE
NRBC BLD AUTO-RTO: 0 /100 WBCS
NT-PROBNP SERPL-MCNC: 101 PG/ML
O2 CT BLDA-SCNC: 20.3 ML/DL (ref 16–23)
OXYHGB MFR BLDA: 94.7 % (ref 94–97)
PCO2 BLDA: 33.7 MM HG (ref 36–44)
PCO2 TEMP ADJ BLDA: 33.6 MM HG (ref 36–44)
PH BLD: 7.41 [PH] (ref 7.35–7.45)
PH BLDA: 7.41 [PH] (ref 7.35–7.45)
PH UR STRIP.AUTO: 5 [PH]
PHOSPHATE SERPL-MCNC: 2.6 MG/DL (ref 2.3–4.1)
PLATELET # BLD AUTO: 127 THOUSANDS/UL (ref 149–390)
PLATELET BLD QL SMEAR: ABNORMAL
PMV BLD AUTO: 9.5 FL (ref 8.9–12.7)
PO2 BLD: 76.8 MM HG (ref 75–129)
PO2 BLDA: 77.3 MM HG (ref 75–129)
POTASSIUM SERPL-SCNC: 4.1 MMOL/L (ref 3.5–5.3)
PROT SERPL-MCNC: 6.9 G/DL (ref 6.4–8.2)
PROT UR STRIP-MCNC: NEGATIVE MG/DL
PROTHROMBIN TIME: 10.6 SECONDS (ref 9.8–12)
RBC # BLD AUTO: 4.56 MILLION/UL (ref 3.88–5.62)
RBC MORPH BLD: NORMAL
SODIUM SERPL-SCNC: 139 MMOL/L (ref 136–145)
SP GR UR STRIP.AUTO: >=1.03 (ref 1–1.03)
SPECIMEN SOURCE: ABNORMAL
TOTAL CELLS COUNTED SPEC: 100
TROPONIN I SERPL-MCNC: <0.02 NG/ML
UROBILINOGEN UR QL STRIP.AUTO: 0.2 E.U./DL
WBC # BLD AUTO: 10.32 THOUSAND/UL (ref 4.31–10.16)

## 2019-12-03 PROCEDURE — 71250 CT THORAX DX C-: CPT

## 2019-12-03 PROCEDURE — 36415 COLL VENOUS BLD VENIPUNCTURE: CPT | Performed by: EMERGENCY MEDICINE

## 2019-12-03 PROCEDURE — 84100 ASSAY OF PHOSPHORUS: CPT | Performed by: EMERGENCY MEDICINE

## 2019-12-03 PROCEDURE — 83880 ASSAY OF NATRIURETIC PEPTIDE: CPT | Performed by: EMERGENCY MEDICINE

## 2019-12-03 PROCEDURE — 85007 BL SMEAR W/DIFF WBC COUNT: CPT | Performed by: EMERGENCY MEDICINE

## 2019-12-03 PROCEDURE — 94640 AIRWAY INHALATION TREATMENT: CPT

## 2019-12-03 PROCEDURE — 99213 OFFICE O/P EST LOW 20 MIN: CPT | Performed by: FAMILY MEDICINE

## 2019-12-03 PROCEDURE — 85027 COMPLETE CBC AUTOMATED: CPT | Performed by: EMERGENCY MEDICINE

## 2019-12-03 PROCEDURE — 84484 ASSAY OF TROPONIN QUANT: CPT | Performed by: EMERGENCY MEDICINE

## 2019-12-03 PROCEDURE — 99285 EMERGENCY DEPT VISIT HI MDM: CPT

## 2019-12-03 PROCEDURE — 93005 ELECTROCARDIOGRAM TRACING: CPT

## 2019-12-03 PROCEDURE — 85610 PROTHROMBIN TIME: CPT | Performed by: EMERGENCY MEDICINE

## 2019-12-03 PROCEDURE — 96361 HYDRATE IV INFUSION ADD-ON: CPT

## 2019-12-03 PROCEDURE — 96374 THER/PROPH/DIAG INJ IV PUSH: CPT

## 2019-12-03 PROCEDURE — 81003 URINALYSIS AUTO W/O SCOPE: CPT | Performed by: STUDENT IN AN ORGANIZED HEALTH CARE EDUCATION/TRAINING PROGRAM

## 2019-12-03 PROCEDURE — 82805 BLOOD GASES W/O2 SATURATION: CPT | Performed by: EMERGENCY MEDICINE

## 2019-12-03 PROCEDURE — 80053 COMPREHEN METABOLIC PANEL: CPT | Performed by: EMERGENCY MEDICINE

## 2019-12-03 PROCEDURE — 94760 N-INVAS EAR/PLS OXIMETRY 1: CPT

## 2019-12-03 PROCEDURE — 85730 THROMBOPLASTIN TIME PARTIAL: CPT | Performed by: EMERGENCY MEDICINE

## 2019-12-03 PROCEDURE — 83735 ASSAY OF MAGNESIUM: CPT | Performed by: EMERGENCY MEDICINE

## 2019-12-03 PROCEDURE — 71045 X-RAY EXAM CHEST 1 VIEW: CPT

## 2019-12-03 RX ORDER — IPRATROPIUM BROMIDE AND ALBUTEROL SULFATE 2.5; .5 MG/3ML; MG/3ML
3 SOLUTION RESPIRATORY (INHALATION)
Status: DISCONTINUED | OUTPATIENT
Start: 2019-12-03 | End: 2019-12-09 | Stop reason: HOSPADM

## 2019-12-03 RX ORDER — SODIUM CHLORIDE 9 MG/ML
75 INJECTION, SOLUTION INTRAVENOUS CONTINUOUS
Status: DISCONTINUED | OUTPATIENT
Start: 2019-12-03 | End: 2019-12-05

## 2019-12-03 RX ORDER — BUDESONIDE 0.5 MG/2ML
0.5 INHALANT ORAL 2 TIMES DAILY
Status: DISCONTINUED | OUTPATIENT
Start: 2019-12-03 | End: 2019-12-09 | Stop reason: HOSPADM

## 2019-12-03 RX ORDER — GABAPENTIN 300 MG/1
300 CAPSULE ORAL 3 TIMES DAILY
Status: DISCONTINUED | OUTPATIENT
Start: 2019-12-03 | End: 2019-12-09 | Stop reason: HOSPADM

## 2019-12-03 RX ORDER — METHYLPREDNISOLONE SODIUM SUCCINATE 125 MG/2ML
60 INJECTION, POWDER, LYOPHILIZED, FOR SOLUTION INTRAMUSCULAR; INTRAVENOUS EVERY 8 HOURS SCHEDULED
Status: DISCONTINUED | OUTPATIENT
Start: 2019-12-03 | End: 2019-12-03

## 2019-12-03 RX ORDER — METHYLPREDNISOLONE SODIUM SUCCINATE 40 MG/ML
60 INJECTION, POWDER, LYOPHILIZED, FOR SOLUTION INTRAMUSCULAR; INTRAVENOUS ONCE
Status: COMPLETED | OUTPATIENT
Start: 2019-12-03 | End: 2019-12-03

## 2019-12-03 RX ORDER — AMLODIPINE BESYLATE 10 MG/1
10 TABLET ORAL DAILY
Status: DISCONTINUED | OUTPATIENT
Start: 2019-12-03 | End: 2019-12-09 | Stop reason: HOSPADM

## 2019-12-03 RX ORDER — PANTOPRAZOLE SODIUM 40 MG/1
40 TABLET, DELAYED RELEASE ORAL 2 TIMES DAILY
Status: DISCONTINUED | OUTPATIENT
Start: 2019-12-03 | End: 2019-12-09 | Stop reason: HOSPADM

## 2019-12-03 RX ORDER — FORMOTEROL FUMARATE 20 UG/2ML
20 SOLUTION RESPIRATORY (INHALATION) 2 TIMES DAILY
Status: DISCONTINUED | OUTPATIENT
Start: 2019-12-03 | End: 2019-12-09 | Stop reason: HOSPADM

## 2019-12-03 RX ORDER — TAMSULOSIN HYDROCHLORIDE 0.4 MG/1
0.4 CAPSULE ORAL DAILY
Status: DISCONTINUED | OUTPATIENT
Start: 2019-12-03 | End: 2019-12-09 | Stop reason: HOSPADM

## 2019-12-03 RX ORDER — FAMOTIDINE 20 MG/1
20 TABLET, FILM COATED ORAL DAILY
Status: DISCONTINUED | OUTPATIENT
Start: 2019-12-03 | End: 2019-12-03 | Stop reason: SDUPTHER

## 2019-12-03 RX ORDER — IPRATROPIUM BROMIDE AND ALBUTEROL SULFATE 2.5; .5 MG/3ML; MG/3ML
3 SOLUTION RESPIRATORY (INHALATION) EVERY 4 HOURS PRN
Status: DISCONTINUED | OUTPATIENT
Start: 2019-12-03 | End: 2019-12-09 | Stop reason: HOSPADM

## 2019-12-03 RX ORDER — ZOLPIDEM TARTRATE 5 MG/1
10 TABLET ORAL
Status: DISCONTINUED | OUTPATIENT
Start: 2019-12-03 | End: 2019-12-09 | Stop reason: HOSPADM

## 2019-12-03 RX ORDER — FINASTERIDE 5 MG/1
5 TABLET, FILM COATED ORAL EVERY MORNING
Status: DISCONTINUED | OUTPATIENT
Start: 2019-12-04 | End: 2019-12-09 | Stop reason: HOSPADM

## 2019-12-03 RX ORDER — METHYLPREDNISOLONE SODIUM SUCCINATE 125 MG/2ML
60 INJECTION, POWDER, LYOPHILIZED, FOR SOLUTION INTRAMUSCULAR; INTRAVENOUS EVERY 8 HOURS SCHEDULED
Status: DISCONTINUED | OUTPATIENT
Start: 2019-12-03 | End: 2019-12-04

## 2019-12-03 RX ADMIN — AZITHROMYCIN 500 MG: 500 INJECTION, POWDER, LYOPHILIZED, FOR SOLUTION INTRAVENOUS at 13:21

## 2019-12-03 RX ADMIN — BISACODYL 10 MG: 5 TABLET, COATED ORAL at 16:16

## 2019-12-03 RX ADMIN — ENOXAPARIN SODIUM 40 MG: 40 INJECTION SUBCUTANEOUS at 16:15

## 2019-12-03 RX ADMIN — BUDESONIDE 0.5 MG: 0.5 INHALANT RESPIRATORY (INHALATION) at 20:58

## 2019-12-03 RX ADMIN — PANTOPRAZOLE SODIUM 40 MG: 40 TABLET, DELAYED RELEASE ORAL at 17:16

## 2019-12-03 RX ADMIN — IPRATROPIUM BROMIDE 0.5 MG: 0.5 SOLUTION RESPIRATORY (INHALATION) at 11:39

## 2019-12-03 RX ADMIN — SODIUM CHLORIDE 125 ML/HR: 0.9 INJECTION, SOLUTION INTRAVENOUS at 11:41

## 2019-12-03 RX ADMIN — IPRATROPIUM BROMIDE 0.5 MG: 0.5 SOLUTION RESPIRATORY (INHALATION) at 12:01

## 2019-12-03 RX ADMIN — ALBUTEROL SULFATE 5 MG: 2.5 SOLUTION RESPIRATORY (INHALATION) at 13:48

## 2019-12-03 RX ADMIN — ALBUTEROL SULFATE 5 MG: 2.5 SOLUTION RESPIRATORY (INHALATION) at 11:39

## 2019-12-03 RX ADMIN — AMLODIPINE BESYLATE 10 MG: 10 TABLET ORAL at 16:16

## 2019-12-03 RX ADMIN — ZOLPIDEM TARTRATE 10 MG: 5 TABLET, COATED ORAL at 21:40

## 2019-12-03 RX ADMIN — ALBUTEROL SULFATE 5 MG: 2.5 SOLUTION RESPIRATORY (INHALATION) at 12:01

## 2019-12-03 RX ADMIN — GABAPENTIN 300 MG: 300 CAPSULE ORAL at 21:40

## 2019-12-03 RX ADMIN — FORMOTEROL FUMARATE DIHYDRATE 20 MCG: 20 SOLUTION RESPIRATORY (INHALATION) at 20:58

## 2019-12-03 RX ADMIN — IPRATROPIUM BROMIDE AND ALBUTEROL SULFATE 3 ML: 2.5; .5 SOLUTION RESPIRATORY (INHALATION) at 16:38

## 2019-12-03 RX ADMIN — METHYLPREDNISOLONE SODIUM SUCCINATE 60 MG: 40 INJECTION, POWDER, FOR SOLUTION INTRAMUSCULAR; INTRAVENOUS at 11:42

## 2019-12-03 RX ADMIN — METHYLPREDNISOLONE SODIUM SUCCINATE 60 MG: 125 INJECTION, POWDER, FOR SOLUTION INTRAMUSCULAR; INTRAVENOUS at 21:40

## 2019-12-03 RX ADMIN — IPRATROPIUM BROMIDE AND ALBUTEROL SULFATE 3 ML: 2.5; .5 SOLUTION RESPIRATORY (INHALATION) at 20:58

## 2019-12-03 RX ADMIN — GABAPENTIN 300 MG: 300 CAPSULE ORAL at 16:16

## 2019-12-03 RX ADMIN — TAMSULOSIN HYDROCHLORIDE 0.4 MG: 0.4 CAPSULE ORAL at 16:16

## 2019-12-03 NOTE — ASSESSMENT & PLAN NOTE
Follows with Pulmonary  Continue with weekly Zemaira injections, budesonide, and performist nebs  -pulmonary consult appreciate recommendations  -patient's discharge has been postponed after weekend delay in nursing home acceptance   -plan for patient to be discharged today with prednisone taper 40 mg x4 days, 30 mg x4 days, 20 mg x4 days, 10 mg x4 days

## 2019-12-03 NOTE — H&P
H&P Exam - Severiano Lazier 75 y o  male MRN: [id number]    Unit/Bed#: [address] [place] [address] Encounter: [id number]    Assessment:  Samantha Kimbrough this 58-year-old male with past medical history of alpha-1 antitrypsin deficiency, emphysema, BPH, pulmonary hypertension who presents with acute on chronic hypoxic respiratory failure and shortness of breath likely secondary progression of alpha-1 antitrypsin deficiency  He will be admitted to observation under the service of Dr Chito Barnhart as expected to stay less than 2 midnights  He is full code expected to return home upon discharge  Plan:  Transaminitis  Assessment & Plan  AST 49,  present on admission  Likely secondary to alpha-1 antitrypsin deficiency  Continue to monitor daily CMP    Centrilobular emphysema (HCC)  Assessment & Plan  Continue with budesonide and Perforomist nebs    BPH (benign prostatic hyperplasia)  Assessment & Plan  Stable continue with tamsulosin and finasteride    Acute on chronic respiratory failure with hypoxia (HCC)  Assessment & Plan  Stable on home 3L NC O2, titrate oxygen as needed  ABG:  PH 7 41, pCO2 33 7, PO2 77 3, H CO3 20 9  Likely secondary to worsening lung function  CT chest:Stable lower lobe predominant severe pulmonary emphysema and hepatic consistent with patient's history of alpha-1 antitrypsin deficiency  Chest x-ray:  No acute cardiopulmonary disease  Solumedrol 60 q 8h IV  Budesonide nebs b i d  Performist nebs b i d   DuoNeb q 6h rest and q 4h p r n    Continue azithromycin 500 milligram IV daily      Essential hypertension  Assessment & Plan  Stable continue with amlodipine 10 milligrams daily  Monitor frequent vital signs    Gastroesophageal reflux disease  Assessment & Plan  Continue with pantoprazole and Zantac    AAT (alpha-1-antitrypsin) deficiency ([facility] [address] )  Assessment & Plan  Follows with Pulmonary  Continue with weekly Zemaira injections, budesonide, and performist nebs      History of Present Illness   Samantha Kimbrough 75year old male with pmh of alpha  1 antitrypsin deficiency, emphysema BPH, and pulmonary hypertension who presents with progressively worsening shortness of breath for 1 week  Occasional cough without sputum production  Reports nasal congestion that is chronic for many years  Utilize nebulizer treatments at home without relief  Was seen at [facility] today and sent over to ER for further evaluation  Sees pulmonologist who prescribed prednisone taper for worsening shortness of breath, currently taking prednisone 20 milligrams daily  Patient wears 3 liters nasal cannula oxygen at home  Typically able to ambulate 20 feet without shortness of breath, however unable to do so currently due to breathing difficulty  Denies any fevers or chills or sick contacts  ER:  Albuterol neb x3, IV azithromycin, Atrovent neb, methylprednisone 60 milligram IV, normal saline 125 cc/hour    Review of Systems   Constitutional: Negative for chills and fever  HENT: Positive for congestion  Negative for rhinorrhea  Respiratory: Positive for shortness of breath  Negative for cough and wheezing  Cardiovascular: Negative for palpitations and leg swelling  Gastrointestinal: Positive for diarrhea  Negative for abdominal pain and vomiting  Skin: Negative for pallor and rash  Neurological: Negative for dizziness, weakness and headaches         Historical Information   Past Medical History:   Diagnosis Date    Anesthesia complication     Difficult to wake up    Arthritis     BPH (benign prostatic hyperplasia)     urinary frequency    Cancer (HCC)     basal cell neck, face    COPD (chronic obstructive pulmonary disease) (HCC)     Full dentures     Hiatal hernia     History of methicillin resistant staphylococcus aureus (MRSA)     10/11/2018 MRSA (nares) positive    Hypertension     controlled    Irritable bowel syndrome     Kidney stone     at least 7 episodes    Liver disease     Alpha 1- enzyme deficiency - diagnosed   has been on weekly replacement therapy since then    Pulmonary emphysema ([address] )     1/25/15  FEV1 - 2 45 liters or 59% of predicted    RSV infection 2017    Wears glasses     for driving only     Past Surgical History:   Procedure Laterality Date    BACK SURGERY      discectomy    COLONOSCOPY      COLONOSCOPY N/A 3/10/2017    Procedure: Ame Wilburn;  Surgeon: Rhonda Weaver MD;  Location: [facility]; Service:    Nakul Reich      removal of kidney stones    ESOPHAGOGASTRODUODENOSCOPY N/A 3/10/2017    Procedure: ESOPHAGOGASTRODUODENOSCOPY (EGD); Surgeon: Rhonda Weaver MD;  Location: [facility] GI LAB; Service:     LITHOTRIPSY      MO ESOPHAGOGASTRODUODENOSCOPY TRANSORAL DIAGNOSTIC N/A 2018    Procedure: ESOPHAGOGASTRODUODENOSCOPY (EGD); Surgeon: Rhonda Weaver MD;  Location: [facility] GI [facility]; Service: Gastroenterology    TONSILLECTOMY      VEIN LIGATION AND STRIPPING Bilateral          Social History   Social History     Substance and Sexual Activity   Alcohol Use Not Currently    Comment: stopped in      Social History     Substance and Sexual Activity   Drug Use No     Social History     Tobacco Use   Smoking Status Former Smoker    Packs/day: 1 00    Years: 60 00    Pack years: 60 00    Last attempt to quit: 2017    Years since quittin 2   Smokeless Tobacco Never Used   Tobacco Comment    quit in 2017     Family History:   Family History   Problem Relation Age of Onset    Emphysema Mother         never smoked    Emphysema Father     Cancer Brother         colon    Colon cancer Brother     Ulcerative colitis Family     Liver disease Family        Meds/Allergies   PTA meds:   Prior to Admission Medications   Prescriptions Last Dose Informant Patient Reported? Taking?    OXYGEN-HELIUM IN 12/3/2019 at Unknown time Self Yes Yes   Sig: Inhale 2L at rest- 3L with activity   VENTOLIN  (90 Base) MCG/ACT inhaler 12/3/2019 at Unknown time  No Yes   Sig: INHALE TWO PUFFS BY MOUTH FOUR TIMES DAILY    ZEMAIRA injection Past Week at Unknown time Self Yes Yes   Sig: Once a week-   albuterol (2 5 mg/3 mL) 0 083 % nebulizer solution 12/3/2019 at Unknown time Self Yes Yes   Sig: Inhale 1 each every 4 (four) hours as needed   amLODIPine (NORVASC) 10 mg tablet 12/3/2019 at Unknown time Self No Yes   Sig: TAKE ONE TABLET BY MOUTH ONE TIME DAILY    bisacodyl (DULCOLAX) 5 mg EC tablet 12/3/2019 at Unknown time  No Yes   Sig: Take 2 tablets (10 mg total) by mouth once for 1 dose Per office instructions   budesonide (PULMICORT) 0 5 mg/2 mL nebulizer solution 12/3/2019 at Unknown time  No Yes   Sig: Take 1 vial (0 5 mg total) by nebulization 2 (two) times a day for 5 days Rinse mouth after use     colestipol (COLESTID) 1 g tablet Unknown at Unknown time Self No No   Sig: Take 1 tablet (1 g total) by mouth 2 (two) times a day   finasteride (PROSCAR) 5 mg tablet 12/3/2019 at Unknown time Self Yes Yes   Sig: Take 5 mg by mouth every morning     fluticasone (FLONASE) 50 mcg/act nasal spray 12/3/2019 at Unknown time Self No Yes   Sig: USE TWO SPRAYS IN EACH NOSTRIL EVERY DAY    formoterol (PERFOROMIST) 20 MCG/2ML nebulizer solution 12/3/2019 at Unknown time Self Yes Yes   Sig: Take 20 mcg by nebulization 2 (two) times a day   gabapentin (NEURONTIN) 300 mg capsule 12/3/2019 at Unknown time Self No Yes   Sig: Take 1 capsule (300 mg total) by mouth 3 (three) times a day   ipratropium-albuterol (DUO-NEB) 0 5-2 5 mg/3 mL nebulizer solution 12/3/2019 at Unknown time Self No Yes   Sig: Take 1 vial (3 mL total) by nebulization 4 (four) times a day   pantoprazole (PROTONIX) 40 mg tablet 12/3/2019 at Unknown time Self No Yes   Sig: Take 1 tablet (40 mg total) by mouth 2 (two) times a day   polyethylene glycol (GOLYTELY) 4000 mL solution   No No   Sig: Take 4,000 mL by mouth once for 1 dose Drink over 3-4 hours starting at 4 PM the evening before your procedure predniSONE 20 mg tablet 12/3/2019 at Unknown time Self No Yes   Sig: Take 1 tablet (20 mg total) by mouth daily Two pills daily x5 days then 1 pill daily for 30 days   ranitidine (ZANTAC) 150 mg tablet Not Taking at Unknown time Self No No   Sig: Take 1 tablet (150 mg total) by mouth daily   Patient not taking: Reported on 11/11/2019   tamsulosin (FLOMAX) 0 4 mg 12/3/2019 at Unknown time Self No Yes   Sig: TAKE ONE CAPSULE BY MOUTH ONE TIME DAILY    zolpidem (AMBIEN) 10 mg tablet 12/2/2019 at Unknown time Self No Yes   Sig: TAKE ONE TABLET BY MOUTH NIGHTLY AT BEDTIME       Facility-Administered Medications: None     Allergies   Allergen Reactions    Chantix [Varenicline]      Caused prostate infection       Objective   First Vitals:   Blood Pressure: (!) 197/96 (12/03/19 1116)  Pulse: 84 (12/03/19 1116)  Temperature: 99 5 °F (37 5 °C) (12/03/19 1116)  Temp Source: Tympanic (12/03/19 1116)  Respirations: 22 (12/03/19 1116)  Height: 6' 5" (195 6 cm) (12/03/19 1115)  Weight - Scale: 90 3 kg (199 lb) (12/03/19 1115)  SpO2: 97 % (12/03/19 1116)    Current Vitals:   Blood Pressure: 161/73 (12/03/19 1554)  Pulse: 95 (12/03/19 1554)  Temperature: 99 1 °F (37 3 °C) (12/03/19 1451)  Temp Source: Oral (12/03/19 1451)  Respirations: 22 (12/03/19 1451)  Height: 6' 5" (195 6 cm) (12/03/19 1115)  Weight - Scale: 90 3 kg (199 lb) (12/03/19 1115)  SpO2: 97 % (12/03/19 1639)      Intake/Output Summary (Last 24 hours) at 12/3/2019 1651  Last data filed at 12/3/2019 1619  Gross per 24 hour   Intake 960 ml   Output    Net 960 ml       Invasive Devices     Peripheral Intravenous Line            Peripheral IV 12/03/19 Left Antecubital less than 1 day                Physical Exam   Constitutional: He is oriented to person, place, and time  He appears well-developed and well-nourished  No distress  HENT:   Head: Normocephalic and atraumatic  Eyes: Conjunctivae are normal    Neck: No tracheal deviation present     Cardiovascular: Normal rate, regular rhythm, normal heart sounds and intact distal pulses  Exam reveals no gallop and no friction rub  No murmur heard  Pulmonary/Chest: Effort normal    Severely diminished breath sounds bilaterally, no wheezes, rales or rhonchi   Abdominal: Soft  Bowel sounds are normal  He exhibits no distension and no mass  There is no tenderness  There is no guarding  Musculoskeletal: He exhibits no edema  Lymphadenopathy:     He has no cervical adenopathy  Neurological: He is alert and oriented to person, place, and time  Skin: Skin is warm and dry  Capillary refill takes less than 2 seconds  No rash noted  He is not diaphoretic  No pallor  Psychiatric: He has a normal mood and affect   His behavior is normal        Lab Results:   Results for orders placed or performed during the hospital encounter of 12/03/19   CBC and differential   Result Value Ref Range    WBC 10 32 (H) 4 31 - 10 16 Thousand/uL    RBC 4 56 3 88 - 5 62 Million/uL    Hemoglobin 15 0 12 0 - 17 0 g/dL    Hematocrit 43 3 36 5 - 49 3 %    MCV 95 82 - 98 fL    MCH 32 9 26 8 - 34 3 pg    MCHC 34 6 31 4 - 37 4 g/dL    RDW 13 2 11 6 - 15 1 %    MPV 9 5 8 9 - 12 7 fL    Platelets 541 (L) 948 - 390 Thousands/uL    nRBC 0 /100 WBCs   Protime-INR   Result Value Ref Range    Protime 10 6 9 8 - 12 0 seconds    INR 0 99 0 91 - 1 09   APTT   Result Value Ref Range    PTT 23 (L) 25 - 32 seconds   Comprehensive metabolic panel   Result Value Ref Range    Sodium 139 136 - 145 mmol/L    Potassium 4 1 3 5 - 5 3 mmol/L    Chloride 103 100 - 108 mmol/L    CO2 26 21 - 32 mmol/L    ANION GAP 10 4 - 13 mmol/L    BUN 21 5 - 25 mg/dL    Creatinine 1 09 0 60 - 1 30 mg/dL    Glucose 115 65 - 140 mg/dL    Calcium 8 8 8 3 - 10 1 mg/dL    AST 49 (H) 5 - 45 U/L     (H) 12 - 78 U/L    Alkaline Phosphatase 68 46 - 116 U/L    Total Protein 6 9 6 4 - 8 2 g/dL    Albumin 3 7 3 5 - 5 0 g/dL    Total Bilirubin 1 00 0 20 - 1 00 mg/dL    eGFR 66 ml/min/1 73sq m Troponin I   Result Value Ref Range    Troponin I <0 02 <=0 04 ng/mL   NT-BNP PRO   Result Value Ref Range    NT-proBNP 101 <450 pg/mL   Magnesium   Result Value Ref Range    Magnesium 1 9 1 6 - 2 6 mg/dL   Phosphorus   Result Value Ref Range    Phosphorus 2 6 2 3 - 4 1 mg/dL   Blood gas, arterial   Result Value Ref Range    pH, Arterial 7 411 7 350 - 7 450    PH ART TC 7 412 7 350 - 7 450    pCO2, Arterial 33 7 (L) 36 0 - 44 0 mm Hg    PCO2 (TC) Arterial 33 6 (L) 36 0 - 44 0 mm Hg    pO2, Arterial 77 3 75 0 - 129 0 mm Hg    PO2 (TC) Arterial 76 8 75 0 - 129 0 mm Hg    HCO3, Arterial 20 9 (L) 22 0 - 28 0 mmol/L    Base Excess, Arterial -2 8 mmol/L    O2 Content, Arterial 20 3 16 0 - 23 0 mL/dL    O2 HGB,Arterial  94 7 94 0 - 97 0 %    SOURCE Radial, Left     Temperature 98 5 Degrees Fehrenheit    Nasal Cannula 3    Manual Differential(PHLEBS Do Not Order)   Result Value Ref Range    Segmented % 75 43 - 75 %    Bands % 1 0 - 8 %    Lymphocytes % 16 14 - 44 %    Monocytes % 7 4 - 12 %    Eosinophils, % 1 0 - 6 %    Basophils % 0 0 - 1 %    Absolute Neutrophils 7 84 (H) 1 85 - 7 62 Thousand/uL    Lymphocytes Absolute 1 65 0 60 - 4 47 Thousand/uL    Monocytes Absolute 0 72 0 00 - 1 22 Thousand/uL    Eosinophils Absolute 0 10 0 00 - 0 40 Thousand/uL    Basophils Absolute 0 00 0 00 - 0 10 Thousand/uL    Total Counted 100     RBC Morphology Normal     Platelet Estimate Borderline (A) Adequate     Imaging:   CT chest without contrast   Final Result      Stable lower lobe predominant severe pulmonary emphysema in a pattern consistent with this patient's history of alpha-1 antitrypsin deficiency  No acute findings  Workstation performed: [id number]         XR chest 1 view portable   Final Result      No acute cardiopulmonary disease  Workstation performed: [id number]           EKG:  Normal sinus rhythm    Code Status: full code    Counseling / Coordination of Care:    Total floor / unit time spent today 25 minutes

## 2019-12-03 NOTE — ASSESSMENT & PLAN NOTE
CT chest:Stable lower lobe predominant severe pulmonary emphysema and hepatic consistent with patient's history of alpha-1 antitrypsin deficiency  Chest x-ray:  No acute cardiopulmonary disease  Solumedrol 60 q 8h IV  Budesonide nebs b i d  Performist nebs b i d   DuoNeb q 6h rest and q 4h p r n    Continue azithromycin 500 milligram IV daily

## 2019-12-03 NOTE — ASSESSMENT & PLAN NOTE
AST 49,  present on admission  Likely secondary to alpha-1 antitrypsin deficiency  Continue to monitor daily CMP

## 2019-12-03 NOTE — PROGRESS NOTES
Assessment/Plan:    No problem-specific Assessment & Plan notes found for this encounter  Diagnoses and all orders for this visit:    COPD exacerbation [facility])  Comments:  Patient was sent to the ER for evaluation  Concern over worsening dyspnea and tachypnea with possible admission needed          Subjective:      Patient ID: Burton [place] is a 75 y o  male  HPI patient presents for follow-up of his emphysema  He has alpha-1 antitrypsin deficiency and is followed by pulmonology as well  Her her today he is more short of breath a week than typical   He denies fever, sweats or chills  He has chronic diarrhea with no blood per rectum  He denies chest pain  He has minimal URI symptoms  Minimal cough  The following portions of the patient's history were reviewed and updated as appropriate: allergies, current medications, past family history, past medical history, past social history, past surgical history and problem list     Review of Systems  denies ear or throat pain  Has mild ankle edema  Rest of review of systems per HPI  Objective:      /80 (BP Location: Left arm, Patient Position: Sitting, Cuff Size: Large)   Pulse 86   Temp 97 9 °F (36 6 °C) (Tympanic)   Resp 16   Ht 6' 5" (1 956 m)   Wt 90 7 kg (199 lb 14 4 oz)   SpO2 94%   BMI 23 70 kg/m²          Physical Exam  generally speaking the patient clearly had conversational dyspnea  He was more winded than typical   Nose and mouth without any visible exudate  Neck without LAD  Lungs with diminished breath sounds bilaterally and minimal expiratory wheezes  Heart regular with no S3 or 4  Abdomen BS positive benign  Extremities with trace ankle edema

## 2019-12-03 NOTE — ASSESSMENT & PLAN NOTE
Stable on home 3L NC O2, titrate oxygen as needed  ABG:  PH 7 41, pCO2 33 7, PO2 77 3, H CO3 20 9  Likely secondary to worsening lung function  CT chest:Stable lower lobe predominant severe pulmonary emphysema and hepatic consistent with patient's history of alpha-1 antitrypsin deficiency  Chest x-ray:  No acute cardiopulmonary disease  Solumedrol 60 q 8h IV, plan to taper clinically  Budesonide nebs b i d  Performist nebs b i d   DuoNeb q 6h rest and q 4h p r n  Continue azithromycin 500 milligram IV daily, IV NS 75 cc/hour  -repeat ABG 12/4:  7 38/36/101/21  -await pulmonary recommendations  - CTA performed 12/4 showed irregular 9 mm left lower lobe nodule demonstrating slow enlargement over time and should be viewed highly suspicious for bronchogenic carcinoma  -patient will receive a dose of Solu-Medrol this a m   And will be transitioned at discharge to prednisone 40 mg daily with 10 mg taper every 5th day

## 2019-12-04 ENCOUNTER — APPOINTMENT (OUTPATIENT)
Dept: RADIOLOGY | Facility: HOSPITAL | Age: 74
DRG: 642 | End: 2019-12-04
Payer: MEDICARE

## 2019-12-04 LAB
ALBUMIN SERPL BCP-MCNC: 3.2 G/DL (ref 3.5–5)
ALP SERPL-CCNC: 60 U/L (ref 46–116)
ALT SERPL W P-5'-P-CCNC: 142 U/L (ref 12–78)
ANION GAP SERPL CALCULATED.3IONS-SCNC: 12 MMOL/L (ref 4–13)
ARTERIAL PATENCY WRIST A: YES
AST SERPL W P-5'-P-CCNC: 39 U/L (ref 5–45)
BASE EXCESS BLDA CALC-SCNC: -3.3 MMOL/L
BILIRUB SERPL-MCNC: 0.6 MG/DL (ref 0.2–1)
BODY TEMPERATURE: 98.7 DEGREES FEHRENHEIT
BUN SERPL-MCNC: 20 MG/DL (ref 5–25)
CALCIUM SERPL-MCNC: 8.3 MG/DL (ref 8.3–10.1)
CHLORIDE SERPL-SCNC: 103 MMOL/L (ref 100–108)
CO2 SERPL-SCNC: 23 MMOL/L (ref 21–32)
CREAT SERPL-MCNC: 1.12 MG/DL (ref 0.6–1.3)
ERYTHROCYTE [DISTWIDTH] IN BLOOD BY AUTOMATED COUNT: 13.1 % (ref 11.6–15.1)
GFR SERPL CREATININE-BSD FRML MDRD: 64 ML/MIN/1.73SQ M
GLUCOSE P FAST SERPL-MCNC: 185 MG/DL (ref 65–99)
GLUCOSE SERPL-MCNC: 185 MG/DL (ref 65–140)
HCO3 BLDA-SCNC: 21.1 MMOL/L (ref 22–28)
HCT VFR BLD AUTO: 38.6 % (ref 36.5–49.3)
HGB BLD-MCNC: 13.3 G/DL (ref 12–17)
MAGNESIUM SERPL-MCNC: 1.8 MG/DL (ref 1.6–2.6)
MCH RBC QN AUTO: 32.8 PG (ref 26.8–34.3)
MCHC RBC AUTO-ENTMCNC: 34.5 G/DL (ref 31.4–37.4)
MCV RBC AUTO: 95 FL (ref 82–98)
NASAL CANNULA: 3
O2 CT BLDA-SCNC: 18.9 ML/DL (ref 16–23)
OXYHGB MFR BLDA: 97.1 % (ref 94–97)
PCO2 BLDA: 35.8 MM HG (ref 36–44)
PCO2 TEMP ADJ BLDA: 36 MM HG (ref 36–44)
PH BLD: 7.39 [PH] (ref 7.35–7.45)
PH BLDA: 7.39 [PH] (ref 7.35–7.45)
PHOSPHATE SERPL-MCNC: 3.6 MG/DL (ref 2.3–4.1)
PLATELET # BLD AUTO: 104 THOUSANDS/UL (ref 149–390)
PMV BLD AUTO: 8.8 FL (ref 8.9–12.7)
PO2 BLD: 101.9 MM HG (ref 75–129)
PO2 BLDA: 101.3 MM HG (ref 75–129)
POTASSIUM SERPL-SCNC: 4.2 MMOL/L (ref 3.5–5.3)
PROT SERPL-MCNC: 6.1 G/DL (ref 6.4–8.2)
RBC # BLD AUTO: 4.05 MILLION/UL (ref 3.88–5.62)
SODIUM SERPL-SCNC: 138 MMOL/L (ref 136–145)
SPECIMEN SOURCE: ABNORMAL
WBC # BLD AUTO: 11.15 THOUSAND/UL (ref 4.31–10.16)

## 2019-12-04 PROCEDURE — 94640 AIRWAY INHALATION TREATMENT: CPT

## 2019-12-04 PROCEDURE — 97110 THERAPEUTIC EXERCISES: CPT

## 2019-12-04 PROCEDURE — 71275 CT ANGIOGRAPHY CHEST: CPT

## 2019-12-04 PROCEDURE — 99223 1ST HOSP IP/OBS HIGH 75: CPT | Performed by: INTERNAL MEDICINE

## 2019-12-04 PROCEDURE — 85027 COMPLETE CBC AUTOMATED: CPT | Performed by: STUDENT IN AN ORGANIZED HEALTH CARE EDUCATION/TRAINING PROGRAM

## 2019-12-04 PROCEDURE — 83735 ASSAY OF MAGNESIUM: CPT | Performed by: STUDENT IN AN ORGANIZED HEALTH CARE EDUCATION/TRAINING PROGRAM

## 2019-12-04 PROCEDURE — 99223 1ST HOSP IP/OBS HIGH 75: CPT | Performed by: FAMILY MEDICINE

## 2019-12-04 PROCEDURE — 80053 COMPREHEN METABOLIC PANEL: CPT | Performed by: STUDENT IN AN ORGANIZED HEALTH CARE EDUCATION/TRAINING PROGRAM

## 2019-12-04 PROCEDURE — 97163 PT EVAL HIGH COMPLEX 45 MIN: CPT

## 2019-12-04 PROCEDURE — 94762 N-INVAS EAR/PLS OXIMTRY CONT: CPT

## 2019-12-04 PROCEDURE — 94760 N-INVAS EAR/PLS OXIMETRY 1: CPT

## 2019-12-04 PROCEDURE — 82805 BLOOD GASES W/O2 SATURATION: CPT | Performed by: STUDENT IN AN ORGANIZED HEALTH CARE EDUCATION/TRAINING PROGRAM

## 2019-12-04 PROCEDURE — 84100 ASSAY OF PHOSPHORUS: CPT | Performed by: STUDENT IN AN ORGANIZED HEALTH CARE EDUCATION/TRAINING PROGRAM

## 2019-12-04 RX ORDER — METHYLPREDNISOLONE SODIUM SUCCINATE 40 MG/ML
40 INJECTION, POWDER, LYOPHILIZED, FOR SOLUTION INTRAMUSCULAR; INTRAVENOUS EVERY 12 HOURS SCHEDULED
Status: DISCONTINUED | OUTPATIENT
Start: 2019-12-04 | End: 2019-12-06

## 2019-12-04 RX ORDER — ACETAMINOPHEN 325 MG/1
650 TABLET ORAL EVERY 6 HOURS PRN
Status: DISCONTINUED | OUTPATIENT
Start: 2019-12-04 | End: 2019-12-09 | Stop reason: HOSPADM

## 2019-12-04 RX ORDER — BUSPIRONE HYDROCHLORIDE 5 MG/1
5 TABLET ORAL 3 TIMES DAILY
Status: DISCONTINUED | OUTPATIENT
Start: 2019-12-04 | End: 2019-12-05

## 2019-12-04 RX ADMIN — BUSPIRONE HYDROCHLORIDE 5 MG: 5 TABLET ORAL at 17:31

## 2019-12-04 RX ADMIN — AMLODIPINE BESYLATE 10 MG: 10 TABLET ORAL at 09:17

## 2019-12-04 RX ADMIN — AZITHROMYCIN 500 MG: 500 INJECTION, POWDER, LYOPHILIZED, FOR SOLUTION INTRAVENOUS at 14:05

## 2019-12-04 RX ADMIN — FORMOTEROL FUMARATE DIHYDRATE 20 MCG: 20 SOLUTION RESPIRATORY (INHALATION) at 08:17

## 2019-12-04 RX ADMIN — IPRATROPIUM BROMIDE AND ALBUTEROL SULFATE 3 ML: 2.5; .5 SOLUTION RESPIRATORY (INHALATION) at 14:03

## 2019-12-04 RX ADMIN — PANTOPRAZOLE SODIUM 40 MG: 40 TABLET, DELAYED RELEASE ORAL at 17:31

## 2019-12-04 RX ADMIN — GABAPENTIN 300 MG: 300 CAPSULE ORAL at 17:30

## 2019-12-04 RX ADMIN — BUDESONIDE 0.5 MG: 0.5 INHALANT RESPIRATORY (INHALATION) at 20:57

## 2019-12-04 RX ADMIN — BUDESONIDE 0.5 MG: 0.5 INHALANT RESPIRATORY (INHALATION) at 07:36

## 2019-12-04 RX ADMIN — BUSPIRONE HYDROCHLORIDE 5 MG: 5 TABLET ORAL at 22:29

## 2019-12-04 RX ADMIN — IOHEXOL 85 ML: 350 INJECTION, SOLUTION INTRAVENOUS at 16:23

## 2019-12-04 RX ADMIN — GABAPENTIN 300 MG: 300 CAPSULE ORAL at 09:17

## 2019-12-04 RX ADMIN — IPRATROPIUM BROMIDE AND ALBUTEROL SULFATE 3 ML: 2.5; .5 SOLUTION RESPIRATORY (INHALATION) at 20:58

## 2019-12-04 RX ADMIN — FINASTERIDE 5 MG: 5 TABLET, FILM COATED ORAL at 09:17

## 2019-12-04 RX ADMIN — IPRATROPIUM BROMIDE AND ALBUTEROL SULFATE 3 ML: 2.5; .5 SOLUTION RESPIRATORY (INHALATION) at 02:22

## 2019-12-04 RX ADMIN — GABAPENTIN 300 MG: 300 CAPSULE ORAL at 22:29

## 2019-12-04 RX ADMIN — BUSPIRONE HYDROCHLORIDE 5 MG: 5 TABLET ORAL at 14:05

## 2019-12-04 RX ADMIN — METHYLPREDNISOLONE SODIUM SUCCINATE 60 MG: 125 INJECTION, POWDER, FOR SOLUTION INTRAMUSCULAR; INTRAVENOUS at 05:27

## 2019-12-04 RX ADMIN — ENOXAPARIN SODIUM 40 MG: 40 INJECTION SUBCUTANEOUS at 09:17

## 2019-12-04 RX ADMIN — IPRATROPIUM BROMIDE AND ALBUTEROL SULFATE 3 ML: 2.5; .5 SOLUTION RESPIRATORY (INHALATION) at 07:36

## 2019-12-04 RX ADMIN — FORMOTEROL FUMARATE DIHYDRATE 20 MCG: 20 SOLUTION RESPIRATORY (INHALATION) at 20:58

## 2019-12-04 RX ADMIN — SODIUM CHLORIDE 75 ML/HR: 0.9 INJECTION, SOLUTION INTRAVENOUS at 20:52

## 2019-12-04 RX ADMIN — METHYLPREDNISOLONE SODIUM SUCCINATE 40 MG: 40 INJECTION, POWDER, FOR SOLUTION INTRAMUSCULAR; INTRAVENOUS at 22:29

## 2019-12-04 RX ADMIN — PANTOPRAZOLE SODIUM 40 MG: 40 TABLET, DELAYED RELEASE ORAL at 09:17

## 2019-12-04 RX ADMIN — TAMSULOSIN HYDROCHLORIDE 0.4 MG: 0.4 CAPSULE ORAL at 09:17

## 2019-12-04 RX ADMIN — ZOLPIDEM TARTRATE 10 MG: 5 TABLET, COATED ORAL at 22:29

## 2019-12-04 NOTE — CONSULTS
Consultation - Pulmonary Medicine  Meghann Lyle 75 y o  male MRN: [id number]  Unit/Bed#: [address] [place] [address] Encounter: [id number]    Assessment/Plan:      Severe bullous emphysematous COPD:  -FEV1 44%  -chronically on performance, Pulmicort,  -uses duo nebs at home  -has attended pulmonary rehab in the past and has a few lifetime days left available  -will need pulmonary rehab in the outpatient setting    Alpha-1 antitrypsin deficiency:  -is on weekly Zemaira injections    Chronic hypoxemic respiratory failure:  Currently on 4 L nasal cannula  Baseline for this patient  He does not qualify for ventilator therapy    Chronic dyspnea:  -I discussed with Cardiology, they do not feel this is cardiac in nature  -recent echos have been normal  -recent new med stress test were with fixed non reproducible defect  -no suggestions of reversible ischemia  -barring all else likely due to poor reserve    History of Remote PE  -consideration for repeat CTA PE study for evaluation for chronic PE  -not currently on anticoagulation therapy  -completed 6 months A/C therapy        Thank you for this consultation; we will be happy to follow with you     ______________________________________________________________________      History of Present Illness   Physician Requesting Consult: Medina Garcia DO  Reason for Consult / Principal Problem:  Acute on chronic hypoxemic respiratory failure  HX and PE limited by:     HPI:  Meghann Lyle is a 75 y o  male  who presents with acute dyspnea and chronic hypoxemia  He was sent to the hospital yesterday by his PCP office for reported dyspnea with a respiratory rate in the 50s  He is a known medical history of severe emphysematous and bullous apical COPD FEV1 44%  secondary to smoking and off anti 1 trypsin deficiency for which he gets weekly Zemaira injections on Wednesdays   chronically managed on Perforomist, Pulmicort, and albuterol, Has a history of 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 with most recent PA pressure 51 mm Hg secondary to his severe COPD, BPH, chronic venous insufficiency  He also has a remote history of pulmonary embolus for which he was anticoagulated for 6 months  History of stable lung nodules    He reports he has at home and roughly has been around her his baseline with no increases in mucus production, fever, myalgias, coughing or wheezing  Does state that he was in his otherwise normal state of health, however, he does become significantly more dyspneic towards the end of the day and with any amount of activity  He went to see his primary care physician yesterday, Dr Griselda Garg, and reported that he was told to come to the emergency department due to high respiratory rates  He reports that it was abnormal for him yesterday, due to the fact that he was more dyspneic in the morning as opposed to normally being significantly more dyspneic in the evening  He is chronically on 3-4 L nasal cannula oxygen  He is compliant with his nebulizer medication  He is admitted to the hospital for acute on chronic hypoxemic respiratory failure and respiratory distress without significant increases in oxygenation  He had chronic consistent emphysematous changes with no acute abnormalities of his CT of his chest, no leukocytosis or otherwise signs of sepsis  He was admitted to the hospital placed on IV steroids 60 mg every 8 hours  He was also placed on azithromycin for possibility of upper respiratory infection  The patient himself reports that in the recent past he feels no significant respiratory benefit either on or off steroids  In the past he has participated with pulmonary rehab at [facility] illness used 24 of his 39 life times sessions and has completed that therapy program remotely  He has had a NM stress test November 2019 which was equivocal for ischemia and preserved EF w/ no RWMA      He was evaluated for oxygen increased requirements on previous discharge with results as follows:             Home Oxygen Qualifying Test         Patient name: Daniel Figueroa        : 1945   Date of Test:  November 15, 2019             Diagnosis:       Home Oxygen Test:    **Medicare Guidelines require item(s) 1-5 on all ambulatory patients or 1 and 2 on non-ambulatory patients         1  Baseline SPO2 on Room Air at rest  86%                    2   SPO2 on Oxygen at rest  95% at a liter flow of 3 lpm      4  SPO2 during exercise on Oxygen  *93% a liter flow of  3lpm      5  Exercise performed:          walking           [x]  Supplemental Home Oxygen is indicated      []  Client does not qualify for home oxygen         Respiratory Additional Notes- patient ambulated in hallways on oxygen  RT [place]       He reports that increases in oxygen do not improve his dyspnea  He otherwise is not a candidate nor would he benefit from a Oxymizer pendant  He also had an ABG performed today which unfortunately does not qualify him for a ventilator  Patient previously had CT evaluation 2019 for PE with suggestion of possible chronic pulmonary embolus, though this was unlikely due to presence of mediastinal adenopathy with an otherwise cause for shortness of breath  Pulmonary consulted in request for evaluation for dyspnea    HPI    Smoking history:  60 pack-year smoking history  Occupational history:  Travel history:  No recent travel  No recent sick contacts  Lives at home by himself      Review of Systems   Constitutional: Positive for fatigue  Negative for activity change and appetite change  HENT: Negative for dental problem, drooling, postnasal drip, rhinorrhea, sinus pressure, sinus pain and sneezing  Eyes: Negative for visual disturbance  Respiratory: Positive for shortness of breath  Negative for apnea  Cardiovascular: Negative for chest pain and leg swelling     Gastrointestinal: Negative for abdominal pain, diarrhea, nausea and vomiting  Endocrine: Negative for cold intolerance and heat intolerance  Genitourinary: Negative for dysuria and flank pain  Musculoskeletal: Negative for arthralgias, joint swelling and myalgias  Skin: Negative for color change and rash  Allergic/Immunologic: Negative for environmental allergies  Neurological: Negative for dizziness and syncope  Hematological: Does not bruise/bleed easily  Psychiatric/Behavioral: Negative for confusion  Past Medical/Surgical History  Past Medical History:   Diagnosis Date    Anesthesia complication     Difficult to wake up    Arthritis     BPH (benign prostatic hyperplasia)     urinary frequency    Cancer (HCC)     basal cell neck, face    COPD (chronic obstructive pulmonary disease) (HCC)     Full dentures     Hiatal hernia     History of methicillin resistant staphylococcus aureus (MRSA)     10/11/2018 MRSA (nares) positive    Hypertension     controlled    Irritable bowel syndrome     Kidney stone     at least 7 episodes    Liver disease     Alpha 1- enzyme deficiency - diagnosed 2002  has been on weekly replacement therapy since then    Pulmonary emphysema ([facility] [address] )     1/25/15  FEV1 - 2 45 liters or 59% of predicted    RSV infection 12/2017    Wears glasses     for driving only     Past Surgical History:   Procedure Laterality Date    BACK SURGERY  2008    discectomy    COLONOSCOPY      COLONOSCOPY N/A 3/10/2017    Procedure: Liz Wilkinson;  Surgeon: Olegario Zarate MD;  Location: [place] [address] GI LAB; Service:    Adonica Pathak      removal of kidney stones    ESOPHAGOGASTRODUODENOSCOPY N/A 3/10/2017    Procedure: ESOPHAGOGASTRODUODENOSCOPY (EGD); Surgeon: Olegario Zarate MD;  Location: [facility] GI LAB; Service:     LITHOTRIPSY      CO ESOPHAGOGASTRODUODENOSCOPY TRANSORAL DIAGNOSTIC N/A 11/20/2018    Procedure: ESOPHAGOGASTRODUODENOSCOPY (EGD); Surgeon: Olegario Zarate MD;  Location: [facility] GI LAB;   Service: Gastroenterology    TONSILLECTOMY  VEIN LIGATION AND STRIPPING Bilateral            Social History  Social History     Substance and Sexual Activity   Alcohol Use Not Currently    Comment: stopped in      Social History     Substance and Sexual Activity   Drug Use No     Social History     Tobacco Use   Smoking Status Former Smoker    Packs/day: 1 00    Years: 60 00    Pack years: 60 00    Last attempt to quit: 2017    Years since quittin 2   Smokeless Tobacco Never Used   Tobacco Comment    quit in 2017       Family History  Family History   Problem Relation Age of Onset    Emphysema Mother         never smoked    Emphysema Father     Cancer Brother         colon    Colon cancer Brother     Ulcerative colitis Family     Liver disease Family        Allergies  Allergies   Allergen Reactions    Chantix [Varenicline]      Caused prostate infection       Home Meds:   Medications Prior to Admission   Medication Sig Dispense Refill Last Dose    albuterol (2 5 mg/3 mL) 0 083 % nebulizer solution Inhale 1 each every 4 (four) hours as needed   12/3/2019 at Unknown time    amLODIPine (NORVASC) 10 mg tablet TAKE ONE TABLET BY MOUTH ONE TIME DAILY  30 tablet 4 12/3/2019 at Unknown time    bisacodyl (DULCOLAX) 5 mg EC tablet Take 2 tablets (10 mg total) by mouth once for 1 dose Per office instructions 2 tablet 0 12/3/2019 at Unknown time    budesonide (PULMICORT) 0 5 mg/2 mL nebulizer solution Take 1 vial (0 5 mg total) by nebulization 2 (two) times a day for 5 days Rinse mouth after use   10 vial 0 12/3/2019 at Unknown time    finasteride (PROSCAR) 5 mg tablet Take 5 mg by mouth every morning     12/3/2019 at Unknown time    fluticasone (FLONASE) 50 mcg/act nasal spray USE TWO SPRAYS IN EACH NOSTRIL EVERY DAY  16 g 4 12/3/2019 at Unknown time    formoterol (PERFOROMIST) 20 MCG/2ML nebulizer solution Take 20 mcg by nebulization 2 (two) times a day   12/3/2019 at Unknown time    gabapentin (NEURONTIN) 300 mg capsule Take 1 capsule (300 mg total) by mouth 3 (three) times a day 90 capsule 3 12/3/2019 at Unknown time    ipratropium-albuterol (DUO-NEB) 0 5-2 5 mg/3 mL nebulizer solution Take 1 vial (3 mL total) by nebulization 4 (four) times a day 120 vial 6 12/3/2019 at Unknown time    OXYGEN-HELIUM IN Inhale 2L at rest- 3L with activity   12/3/2019 at Unknown time    pantoprazole (PROTONIX) 40 mg tablet Take 1 tablet (40 mg total) by mouth 2 (two) times a day 60 tablet 5 12/3/2019 at Unknown time    predniSONE 20 mg tablet Take 1 tablet (20 mg total) by mouth daily Two pills daily x5 days then 1 pill daily for 30 days 40 tablet 0 12/3/2019 at Unknown time    tamsulosin (FLOMAX) 0 4 mg TAKE ONE CAPSULE BY MOUTH ONE TIME DAILY  30 capsule 2 12/3/2019 at Unknown time    VENTOLIN  (90 Base) MCG/ACT inhaler INHALE TWO PUFFS BY MOUTH FOUR TIMES DAILY  18 g 4 12/3/2019 at Unknown time    ZEMAIRA injection Once a week-   Past Week at Unknown time    zolpidem (AMBIEN) 10 mg tablet TAKE ONE TABLET BY MOUTH NIGHTLY AT BEDTIME  30 tablet 5 12/2/2019 at Unknown time    colestipol (COLESTID) 1 g tablet Take 1 tablet (1 g total) by mouth 2 (two) times a day 60 tablet 5 Unknown at Unknown time    polyethylene glycol (GOLYTELY) 4000 mL solution Take 4,000 mL by mouth once for 1 dose Drink over 3-4 hours starting at 4 PM the evening before your procedure 4000 mL 0 Unknown at Unknown time    ranitidine (ZANTAC) 150 mg tablet Take 1 tablet (150 mg total) by mouth daily (Patient not taking: Reported on 11/11/2019) 30 tablet 3 Not Taking at Unknown time     Current Meds:   Scheduled Meds:  Current Facility-Administered Medications:  amLODIPine 10 mg Oral Daily Deisy Dickens MD    azithromycin 500 mg Intravenous Q24H Deisy Dickens MD    budesonide 0 5 mg Nebulization BID Deisy Dickens MD    enoxaparin 40 mg Subcutaneous Daily Deisy Dickens MD    finasteride 5 mg Oral QAM Deisy Dickens MD    formoterol 20 mcg Nebulization BID Shelby Mcnally MD    gabapentin 300 mg Oral TID Shelby Mcnally MD    ipratropium-albuterol 3 mL Nebulization Q6H Shelby Mcnally MD    ipratropium-albuterol 3 mL Nebulization Q4H PRN Shelby Mcnally MD    methylPREDNISolone sodium succinate 60 mg Intravenous [facility] Shelby Mcnally MD    pantoprazole 40 mg Oral BID Shelby Mcnally MD    sodium chloride 75 mL/hr Intravenous Continuous Shelby Mcnally MD Last Rate: 75 mL/hr (12/03/19 1619)   tamsulosin 0 4 mg Oral Daily Shelby Mcnally MD    zolpidem 10 mg Oral HS Shelby Mcnally MD      PRN Meds:  ipratropium-albuterol 3 mL Q4H PRN       ____________________________________________________________________    Objective   Vitals:   Temp:  [97 9 °F (36 6 °C)-99 5 °F (37 5 °C)] 97 9 °F (36 6 °C)  HR:  [] 81  Resp:  [15-22] 20  BP: (142-197)/(67-96) 142/68  Weight (last 2 days)     Date/Time   Weight    12/03/19 1115   90 3 (199)            Oxygen Therapy  SpO2: 97 %  O2 Flow Rate (L/min): 3 L/min    IV Infusions:     sodium chloride 75 mL/hr Last Rate: 75 mL/hr (12/03/19 1619)       Nutrition:        Diet Orders   (From admission, onward)             Start     Ordered    12/03/19 1542  Diet Regular; Regular House  Diet effective now     Question Answer Comment   Diet Type Regular    Regular Regular House    RD to adjust diet per protocol? Yes        12/03/19 1541    12/03/19 1440  Room Service  Once     Question:  Type of Service  Answer:  Room Service-Appropriate    12/03/19 1440                  Ins/Outs:   I/O       12/02 0701 - 12/03 0700 12/03 0701 - 12/04 0700 12/04 0701 - 12/05 0700    P  O   240     I V  (mL/kg)  1986 3 (22)     Total Intake(mL/kg)  2226 3 (24 7)     Urine (mL/kg/hr)  770     Total Output  770     Net  +1456 3                    Lines/Drains:  Invasive Devices     Peripheral Intravenous Line            Peripheral IV 12/03/19 Left Antecubital less than 1 day ____________________________________________________________________      Physical Exam   Constitutional: He is oriented to person, place, and time  He appears well-developed  HENT:   Head: Normocephalic and atraumatic  Eyes: Pupils are equal, round, and reactive to light  Conjunctivae are normal    Neck: Normal range of motion  Neck supple  Cardiovascular: Normal rate, regular rhythm and normal heart sounds  Exam reveals no gallop and no friction rub  No murmur heard  Pulmonary/Chest: Effort normal  No accessory muscle usage  No tachypnea  No respiratory distress  He has decreased breath sounds in the right upper field, the right middle field, the right lower field, the left upper field, the left middle field and the left lower field  He has no wheezes  He has no rhonchi  He has no rales  He exhibits no tenderness  Patient is moderate to severely decreased breath sounds    Currently on 4 L nasal cannula   Abdominal: Soft  Bowel sounds are normal    Musculoskeletal: Normal range of motion  He exhibits no edema  Neurological: He is alert and oriented to person, place, and time  Skin: Skin is warm and dry     Psychiatric: He has a normal mood and affect        ____________________________________________________________________    Labs:   CBC: Results from last 7 days   Lab Units 12/04/19  0452 12/03/19  1140   WBC Thousand/uL 11 15* 10 32*   HEMOGLOBIN g/dL 13 3 15 0   HEMATOCRIT % 38 6 43 3   MCV fL 95 95   PLATELETS Thousands/uL 104* 127*     CMP: Results from last 7 days   Lab Units 12/04/19  0452 12/03/19  1140   POTASSIUM mmol/L 4 2 4 1   CHLORIDE mmol/L 103 103   CO2 mmol/L 23 26   BUN mg/dL 20 21   CREATININE mg/dL 1 12 1 09   CALCIUM mg/dL 8 3 8 8   AST U/L 39 49*   ALT U/L 142* 170*   ALK PHOS U/L 60 68   EGFR ml/min/1 73sq m 64 66     Magnesium:   Results from last 7 days   Lab Units 12/04/19  0452   MAGNESIUM mg/dL 1 8     Phosphorous:   Results from last 7 days   Lab Units 12/04/19  0452   PHOSPHORUS mg/dL 3 6     Troponin:   Results from last 7 days   Lab Units 12/03/19  1140   TROPONIN I ng/mL <0 02     PT/INR:   Results from last 7 days   Lab Units 12/03/19  1140   PTT seconds 23*   INR  0 99     Lactic Acid:     BNP:   Results from last 7 days   Lab Units 12/03/19  1140   NT-PRO BNP pg/mL 101     ABG: Results from last 7 days   Lab Units 12/04/19  0529 12/03/19  1247   PH ART  7 388 7 411   PCO2 ART mm Hg 35 8* 33 7*   PO2 ART mm Hg 101 3 77 3   HCO3 ART mmol/L 21 1* 20 9*   ABG SOURCE  Radial, Left Radial, Left      Procalcitonin: Invalid input(s): PROCALCITONIN    Imaging:   Ct Chest Without Contrast    Result Date: 12/3/2019  Narrative CT CHEST WITHOUT IV CONTRAST INDICATION:   cough, sob  COMPARISON:  CT chest 10/1/2019  TECHNIQUE: CT examination of the chest was performed without intravenous contrast   Axial, sagittal, and coronal 2D reformatted images were created from the source data and submitted for interpretation  Radiation dose length product (DLP) for this visit:  331 71 mGy-cm   This examination, like all CT scans performed in the [facility], was performed utilizing techniques to minimize radiation dose exposure, including the use of iterative  reconstruction and automated exposure control  FINDINGS: LUNGS:  Stable lower lobe predominant severe pulmonary emphysema in a pattern consistent with this patient's history of alpha-1 antitrypsin deficiency  Stable right basilar bulla measuring 33 mm  No focal infiltrate/consolidation  Stable linear scarring through the right lower lobe  No endotracheal or endobronchial lesion  Reidentified 3 to 4 mm left upper lobe nodule #3/16  PLEURA:  Unremarkable  HEART/GREAT VESSELS:  Atherosclerotic changes are noted in thoracic aorta and coronary arteries  MEDIASTINUM AND MIKE:  Previously seen small hiatal hernia is less well-visualized on today's study  CHEST WALL AND LOWER NECK:   Unremarkable   VISUALIZED STRUCTURES IN THE UPPER ABDOMEN:  Fatty infiltration of the partially imaged liver  Simple appearing cysts in the partially imaged left kidney  OSSEOUS STRUCTURES:  No acute fracture or destructive osseous lesion  Impression Stable lower lobe predominant severe pulmonary emphysema in a pattern consistent with this patient's history of alpha-1 antitrypsin deficiency  No acute findings  Workstation performed: [id number]     Ct Chest With Contrast    Result Date: 10/2/2019  Narrative CT CHEST WITH IV CONTRAST INDICATION:   R91 8: Other nonspecific abnormal finding of lung field  COMPARISON:  06/30/2019, 10/11/2018 TECHNIQUE: CT examination of the chest was performed  Axial, sagittal, and coronal 2D reformatted images were created from the source data and submitted for interpretation  Radiation dose length product (DLP) for this visit:  392 mGy-cm   This examination, like all CT scans performed in the [facility], was performed utilizing techniques to minimize radiation dose exposure, including the use of iterative reconstruction and automated exposure control  IV Contrast:  100 mL of iohexol (OMNIPAQUE) FINDINGS: LUNGS:  Severe emphysematous changes are present  A bulla is seen at the right lung base  New 4 mm left upper lobe nodule, image 14 series 3  No additional new nodules  Chronic airspace disease in the right lower lobe  Stable soft tissue density at the bifurcation of the left lower lobe pulmonary artery  PLEURA:  No pleural effusion  Stable pleural-based calcifications on the right  HEART/GREAT VESSELS:  Unremarkable for patient's age  MEDIASTINUM AND MIKE:  Hiatal hernia  There is some thickening of the distal esophagus    CHEST WALL AND LOWER NECK:   Unremarkable  VISUALIZED STRUCTURES IN THE UPPER ABDOMEN:  There is fatty change in the liver  Cyst in the upper pole of the left kidney    OSSEOUS STRUCTURES:  No acute fracture or destructive osseous lesion  Impression 1  Severe emphysema  2   New 4 mm nodule in the left upper lobe  Recommend CT scan of the chest in 12 months time for follow-up in this high risk patient based on Fleischner Society guidelines  3   Stable soft tissue density at the bifurcation of the left lower lobe pulmonary artery  4   Hiatal hernia with stable thickening of the distal esophagus  This may be on the basis of reflux  The study was marked in EPIC for significant notification  Workstation performed: [id number]      Xr Chest Pa & Lateral    Result Date: 2019  Impression Stable right upper lobe opacity which may be inflammatory in origin  Continued surveillance recommended  Follow-up chest x-ray recommended in one months time  The study was marked in EPIC for significant notification  Workstation performed: [id number]     Xr Chest Pa & Lateral    Result Date: 2019  Impression No acute cardiopulmonary disease  COPD  Workstation performed: [id number]     Xr Chest Pa & Lateral    Result Date: 2019  Impression No acute cardiopulmonary disease  Chronic changes  Workstation performed: [id number]     Xr Chest Pa & Lateral    Result Date: 2019  Impression Discoid atelectasis versus scarring right upper lobe   Workstation performed: [id number]        2019:    [address]  [address] [facility] [address]  [phone]     Rest/Stress Gated SPECT Myocardial Perfusion Imaging After Regadenoson     Patient: Bindu Gabriel  MR number: [id number]  Account number: [de-identified]  : 1945  Age: 75 years  Gender: Male  Status: Inpatient  Location: Stress lab  Height: 77 in  Weight: 192 lb  BP: 172/ 80 mmHg     Allergies: VARENICLINE     Diagnosis: I21 4 - Non-ST elevation (NSTEMI) myocardial infarction, R07 9 - Chest pain, unspecified     Primary Physician:  Bertha Barnes MD  RN:  ANA Nava  Group:  Haley Sy  Report Prepared By[de-identified]  ANA Nava  Interpreting Physician:  Gavino Eng MD     INDICATIONS: Evaluation for coronary artery disease      HISTORY: The patient is a 75year old  male  Chest pain status: chest pain  Other symptoms: dyspnea  Coronary artery disease risk factors: hypertension  Cardiovascular history: none significant  Co-morbidity: history of lung  disease  Medications: a calcium channel blocker      PHYSICAL EXAM: Baseline physical exam screening: scant wheezing audible      REST ECG: Normal sinus rhythm      PROCEDURE: The study was performed in the the Stress lab  A regadenoson infusion pharmacologic stress test was performed  Gated SPECT myocardial perfusion imaging was performed after stress and at rest  Systolic blood pressure was 172  mmHg, at the start of the study  Diastolic blood pressure was 80 mmHg, at the start of the study  The heart rate was 92 bpm, at the start of the study  IV double checked      DOBUTAMINE PROTOCOL:  Time HR bpm SBP mmHg DBP mmHg Symptoms Rhythm/conduct  Baseline 13:31 93 172 80 none NSR  10 mcg/kg/min 13:34 92 180 86 none same as above  15 mcg/kg/min 13:37 100 195 89 none --  20 mcg/kg/min 13:38 107 183 69 chest discomfort, dizziness --  Immediate 13:43 134 187 81 same as above --  Recovery I 13:46 114 178 74 subsiding --  Recovery II 13:48 105 173 79 subsiding --  Recovery III 13:50 97 174 79 none --  No medications or fluids given      STRESS SUMMARY: Duration of pharmacologic stress was 10 min and 5 sec   total dose given= 13 4 mg Maximal heart rate during stress was 139 bpm ( 96 % of maximal predicted heart rate)  The heart rate response to stress was normal  There was resting hypertension with a hypertensive blood pressure response to  stress  The rate-pressure product for the peak heart rate and blood pressure was 72025  The patient experienced chest pain during stress; pain resolved spontaneously  The stress test was terminated due to protocol completion and  achievement of target heart rate  Pre oxygen saturation: 97 %   The stress ECG was equivocal for ischemia  Arrhythmia during stress: isolated premature ventricular beats      ISOTOPE ADMINISTRATION:  Resting isotope administration Stress isotope administration  Agent Tetrofosmin Tetrofosmin  Dose 11 mCi 32 4 mCi  Date 11/11/2019 11/11/2019     Stress isotope administration  Agent Tetrofosmin  Dose 11 mCi  Date 11/11/2019     The radiopharmaceutical was injected at the peak effect of pharmacologic stress      MYOCARDIAL PERFUSION IMAGING:  The image quality was fair  Rotating projection images reveal moderate diaphragmatic attenuation and moderate patient motion  Left ventricular size was normal  The TID ratio was 1 1  The right ventricle was dilated      PERFUSION DEFECTS:  -  There was a moderate-sized, moderately severe, fixed myocardial perfusion defect of the entire inferior wall likely due to diaphragm attenuation as it normalizes in prone images      GATED SPECT:  The calculated left ventricular ejection fraction was 75 %  Left ventricular ejection fraction was within normal limits by visual estimate  There was no left ventricular regional abnormality      SUMMARY:  -  Stress results: Target heart rate was achieved  There was resting hypertension with a hypertensive blood pressure response to stress  The patient experienced chest pain during stress; pain resolved spontaneously  -  ECG conclusions: The stress ECG was equivocal for ischemia  -  Perfusion imaging: There was a moderate-sized, moderately severe, fixed myocardial perfusion defect of the entire inferior wall likely due to diaphragm attenuation as it normalizes in prone images  -  Gated SPECT: The calculated left ventricular ejection fraction was 75 %  Left ventricular ejection fraction was within normal limits by visual estimate   There was no left ventricular regional abnormality   -  Impressions and recommendations: Probably normal study after pharmacologic stress without reproduction of symptoms      IMPRESSIONS: Probably normal study after pharmacologic stress without reproduction of symptoms  There was image artifact, without diagnostic evidence for perfusion abnormality  Left ventricular systolic function was normal      Prepared and signed by     Amber Romero MD  Signed 11/12/2019 11:00:30    Micro: Lab Results   Component Value Date    BLOODCX No Growth After 5 Days  12/29/2017    BLOODCX No Growth After 5 Days   12/29/2017    WOUNDCULT 3+ Growth of Staphylococcus aureus (A) 06/12/2019    MRSACULTURE  11/11/2019     No Methicillin Resistant Staphlyococcus aureus (MRSA) isolated        ____________________________________________________________________      Code Status: Level 1 - Full Code

## 2019-12-04 NOTE — SOCIAL WORK
Day 1, Pt is not a MB  Pt known to CM from previous encounters  Pt is a readmission, sent in by his PCP  Pt states after being discharged he went to his follow up appointment and took his medications as ordered  Pt states he went to [facility] for rehab in the past but did not like being mixed in with LTC residents  CM obtained the following information from the pt:  [place], 2 steps w/rail to enter  Lives with: alone  DME: SPC, home oxygen through [employer], wears 3L NC AAT  [place]: was IPTA  Drives: locally  Pharmacy: [id number] [employer] in [address] [facility]  PCP [address] [facility] [place]: [address]  Rehab: [facility]  POA/LW: No, declined information   Transport at DC: Pts daughter Shira Nathan is recommending STR for pt  CM explained recommendation, referral process and provided choices  Pt states he wants a STR where rehab pts are not mixed w/LTC pts  CM made a couple of suggestions to pt and provided choice printout  Pt states he wants to see how he progresses to see if he wants STR or [facility] therapy, he will let CM know his decision

## 2019-12-04 NOTE — UTILIZATION REVIEW
Initial Clinical Review    Admission: Date/Time/Statement:   Orders Placed This Encounter   Procedures    Place in Observation     Standing Status:   Standing     Number of Occurrences:   1     Order Specific Question:   Admitting Physician     Answer:   Baron Murphy     Order Specific Question:   Level of Care     Answer:   Med Surg [16]     ED Arrival Information     Expected Arrival Acuity Means of Arrival Escorted By Service Admission Type    - 12/3/2019 11:11 Emergent Wheelchair Self General Medicine Emergency    Arrival Complaint    Shortness of Breath        Chief Complaint   Patient presents with    Shortness of Breath     pt c/o chronic SOB worsening over past few days  saw PCP today & sent to ER for evaluation  Assessment/Plan: 75year old male with pmh of alpha  1 antitrypsin deficiency, emphysema BPH, and pulmonary hypertension who presents with progressively worsening shortness of breath for 1 week  Occasional cough without sputum production  Reports nasal congestion that is chronic for many years  Utilize nebulizer treatments at home without relief  Sees pulmonologist who prescribed prednisone taper for worsening shortness of breath, currently taking prednisone 20 milligrams daily  Patient wears 3 liters nasal cannula oxygen at home  Typically able to ambulate 20 feet without shortness of breath, however unable to do so currently due to breathing difficulty  Denies any fevers or chills or sick contacts    Transaminitis  Assessment & Plan  AST 49,  present on admission  Likely secondary to alpha-1 antitrypsin deficiency  Continue to monitor daily CMP     Centrilobular emphysema (HCC)  Assessment & Plan  Continue with budesonide and Perforomist nebs     BPH (benign prostatic hyperplasia)  Assessment & Plan  Stable continue with tamsulosin and finasteride     Acute on chronic respiratory failure with hypoxia (HCC)  Assessment & Plan  Stable on home 3L NC O2, titrate oxygen as needed  ABG:  PH 7 41, pCO2 33 7, PO2 77 3, H CO3 20 9  Likely secondary to worsening lung function  CT chest:Stable lower lobe predominant severe pulmonary emphysema and hepatic consistent with patient's history of alpha-1 antitrypsin deficiency  Chest x-ray:  No acute cardiopulmonary disease  Solumedrol 60 q 8h IV  Budesonide nebs b i d  Performist nebs b i d   DuoNeb q 6h rest and q 4h p r n  Continue azithromycin 500 milligram IV daily  Essential hypertension  Assessment & Plan  Stable continue with amlodipine 10 milligrams daily  Monitor frequent vital signs     Gastroesophageal reflux disease  Assessment & Plan  Continue with pantoprazole and Zantac     AAT (alpha-1-antitrypsin) deficiency (HCC)  Assessment & Plan  Follows with Pulmonary  Continue with weekly Zemaira injections, budesonide, and performist    He will be admitted to observation under the service of Dr Felix Mazariegos as expected to stay less than 2 midnights  He is full code expected to return home upon discharge    ED Triage Vitals [12/03/19 1116]   Temperature Pulse Respirations Blood Pressure SpO2   99 5 °F (37 5 °C) 84 22 (!) 197/96 97 %      Temp Source Heart Rate Source Patient Position - Orthostatic VS BP Location FiO2 (%)   Tympanic Monitor Sitting Left arm --      Pain Score       No Pain        Wt Readings from Last 1 Encounters:   12/03/19 90 3 kg (199 lb)     Additional Vital Signs:  12/03/19 2244              Nasal cannula     12/03/19 2011  98 3 °F (36 8 °C)  93  20  152/70    95 %  Nasal cannula  Lying   12/03/19 1639            97 %  Nasal cannula     12/03/19 1554    95    161/73           12/03/19 1451  99 1 °F (37 3 °C)  102  22  180/85Abnormal     95 %  Nasal cannula  Sitting   12/03/19 1345    88  20  161/77    95 %  Nasal cannula     12/03/19 1330    86  20  164/77    95 %       12/03/19 1315    86  16  177/83Abnormal     96 %  Nasal cannula  Lying   12/03/19 1200    76  15  172/85Abnormal    96 %          Pertinent Labs/Diagnostic Test Results:   Ct chest Stable lower lobe predominant severe pulmonary emphysema in a pattern consistent with this patient's history of alpha-1 antitrypsin deficiency    Results from last 7 days   Lab Units 12/04/19  0452 12/03/19  1140   WBC Thousand/uL 11 15* 10 32*   HEMOGLOBIN g/dL 13 3 15 0   HEMATOCRIT % 38 6 43 3   PLATELETS Thousands/uL 104* 127*   BANDS PCT %  --  1     Results from last 7 days   Lab Units 12/04/19  0452 12/03/19  1140   SODIUM mmol/L 138 139   POTASSIUM mmol/L 4 2 4 1   CHLORIDE mmol/L 103 103   CO2 mmol/L 23 26   ANION GAP mmol/L 12 10   BUN mg/dL 20 21   CREATININE mg/dL 1 12 1 09   EGFR ml/min/1 73sq m 64 66   CALCIUM mg/dL 8 3 8 8   MAGNESIUM mg/dL 1 8 1 9   PHOSPHORUS mg/dL 3 6 2 6     Results from last 7 days   Lab Units 12/04/19  0452 12/03/19  1140   AST U/L 39 49*   ALT U/L 142* 170*   ALK PHOS U/L 60 68   TOTAL PROTEIN g/dL 6 1* 6 9   ALBUMIN g/dL 3 2* 3 7   TOTAL BILIRUBIN mg/dL 0 60 1 00     Results from last 7 days   Lab Units 12/04/19  0452 12/03/19  1140   GLUCOSE RANDOM mg/dL 185* 115     Results from last 7 days   Lab Units 12/04/19  0529 12/03/19  1247   PH ART  7 388 7 411   PCO2 ART mm Hg 35 8* 33 7*   PO2 ART mm Hg 101 3 77 3   HCO3 ART mmol/L 21 1* 20 9*   BASE EXC ART mmol/L -3 3 -2 8   O2 CONTENT ART mL/dL 18 9 20 3   O2 HGB, ARTERIAL % 97 1* 94 7   ABG SOURCE  Radial, Left Radial, Left     Results from last 7 days   Lab Units 12/03/19  1140   TROPONIN I ng/mL <0 02         Results from last 7 days   Lab Units 12/03/19  1140   PROTIME seconds 10 6   INR  0 99   PTT seconds 23*     Results from last 7 days   Lab Units 12/03/19  1140   NT-PRO BNP pg/mL 101     Results from last 7 days   Lab Units 12/03/19  1845   CLARITY UA  Clear   COLOR UA  Yellow   SPEC GRAV UA  >=1 030   PH UA  5 0   GLUCOSE UA mg/dl 500 (1/2%)*   KETONES UA mg/dl Negative   BLOOD UA  Negative   PROTEIN UA mg/dl Negative   NITRITE UA  Negative   BILIRUBIN UA Negative   UROBILINOGEN UA E U /dl 0 2   LEUKOCYTES UA  Negative     Results from last 7 days   Lab Units 12/03/19  1140   TOTAL COUNTED  100       ED Treatment:   Medication Administration from 12/03/2019 1111 to 12/03/2019 1415       Date/Time Order Dose Route Action Action by Comments     12/03/2019 1141 sodium chloride 0 9 % infusion 125 mL/hr Intravenous New Bag Aleyda Garcia RN      12/03/2019 1139 ipratropium (ATROVENT) 0 02 % inhalation solution 0 5 mg 0 5 mg Nebulization Given Aleyda Garcia RN      12/03/2019 1139 albuterol inhalation solution 5 mg 5 mg Nebulization Given Aleyda Garcia RN      12/03/2019 1142 methylPREDNISolone sodium succinate (Solu-MEDROL) injection 60 mg 60 mg Intravenous Given Aleyda Garcia RN      12/03/2019 1201 albuterol inhalation solution 5 mg 5 mg Nebulization Given Aleyda Garcia RN      12/03/2019 1201 ipratropium (ATROVENT) 0 02 % inhalation solution 0 5 mg 0 5 mg Nebulization Given Aleyda Garcia RN      12/03/2019 1321 azithromycin (ZITHROMAX) 500 mg in sodium chloride 0 9% 250mL IVPB 500 mg 500 mg Intravenous New Bag Aleyda Garcia RN      12/03/2019 1348 albuterol inhalation solution 5 mg 5 mg Nebulization Given Manjeet Turner RN         Past Medical History:   Diagnosis Date    Anesthesia complication     Difficult to wake up    Arthritis     BPH (benign prostatic hyperplasia)     urinary frequency    Cancer (HCC)     basal cell neck, face    COPD [facility]chronic obstructive pulmonary disease) ([facility] [address] )     Full dentures     Hiatal hernia     History of methicillin resistant staphylococcus aureus (MRSA)     10/11/2018 MRSA (nares) positive    Hypertension     controlled    Irritable bowel syndrome     Kidney stone     at least 7 episodes    Liver disease     Alpha 1- enzyme deficiency - diagnosed 2002   has been on weekly replacement therapy since then    Pulmonary emphysema ([facility] [address] )     1/25/15  FEV1 - 2 45 liters or 59% of predicted    RSV infection 12/2017    Wears glasses     for driving only     Present on Admission:   AAT (alpha-1-antitrypsin) deficiency ([facility] [address] [facility]   Acute on chronic respiratory failure with hypoxia (HCC)   BPH (benign prostatic hyperplasia)   Centrilobular emphysema (HCC)   Chronic venous insufficiency   Essential hypertension   Gastroesophageal reflux disease   Pulmonary hypertension (HCC)   Transaminitis      Admitting Diagnosis: SOB (shortness of breath) [R06 02]  COPD with acute exacerbation (HCC) [J44 1]  Age/Sex: 75 y o  male  Admission Orders:  Observation  Pulse ox 3l nc   Pt ot  Peak flow  Scheduled Medications:    Medications:  amLODIPine 10 mg Oral Daily   azithromycin 500 mg Intravenous Q24H   budesonide 0 5 mg Nebulization BID   enoxaparin 40 mg Subcutaneous Daily   finasteride 5 mg Oral QAM   formoterol 20 mcg Nebulization BID   gabapentin 300 mg Oral TID   ipratropium-albuterol 3 mL Nebulization Q6H   methylPREDNISolone sodium succinate 60 mg Intravenous Q8H [address]   pantoprazole 40 mg Oral BID   tamsulosin 0 4 mg Oral Daily   zolpidem 10 mg Oral HS     Continuous IV Infusions:    sodium chloride 75 mL/hr Intravenous Continuous     PRN Meds:    ipratropium-albuterol 3 mL Nebulization Q4H PRN       IP CONSULT TO PULMONOLOGY    Network Utilization Review Department  [email or web address]  ATTENTION: Please call with any questions or concerns to [phone] and carefully listen to the prompts so that you are directed to the right person  All voicemails are confidential   Caren Garcia all requests for admission clinical reviews, approved or denied determinations and any other requests to dedicated fax number below belonging to the campus where the patient is receiving treatment   List of dedicated fax numbers for the Facilities:  [address] DENIALS (Administrative/Medical Necessity) [phone]   [address] (Maternity/NICU/Pediatrics) [phone]   Jyothi Silverio [place] [phone]   [place] [address] [phone]   Piper Avendaño [phone]   [address] [phone]   [facility]  [phone]   [place] [address] [phone]

## 2019-12-04 NOTE — PHYSICAL THERAPY NOTE
PT EVALUATION       12/04/19 1105   Note Type   Note type Eval/Treat   Pain Assessment   Pain Assessment No/denies pain   Home Living   Type of Home House  (2 TEMI in front, no rail;1 TEMI with rail in garage)   Home Layout One level   Bathroom Shower/Tub Walk-in shower   Bathroom Equipment Built-in shower seat  (pt states built in seat is too small to use)   [address]  (RW that neighbor has;home O2 3L 24/7)   Prior Function   Level of [place] Independent with ADLs and functional mobility  (pt amb w/wout cane inside;cruises furniture;cane outside)   Lives With Alone   Receives Help From Family   ADL Assistance Independent   IADLs Needs assistance   Comments Pt still drives  Dtr gets pt's groceries, pt has cleaning lady 2x/month   Restrictions/Precautions   Other Precautions O2;Fall Risk   General   Additional Pertinent History Pt adm with COPD exacerbation  Family/Caregiver Present No   Cognition   Overall Cognitive Status WFL   Arousal/Participation Cooperative   Orientation Level Oriented X4   Following Commands Follows all commands and directions without difficulty   RLE Assessment   RLE Assessment WFL  (3+ to 4-/5)   LLE Assessment   LLE Assessment WFL  (3+ to 4-/5)   Bed Mobility   Supine to Sit 7  Independent   Sit to Supine 7  Independent   Transfers   Sit to Stand 4  Minimal assistance   Additional items Assist x 1;Verbal cues  (with bed raised)   Stand to Sit 4  Minimal assistance   Additional items Assist x 1;Verbal cues   Ambulation/Elevation   Gait pattern Foward flexed; Short stride  (unsteady)   Gait Assistance 4  Minimal assist   Additional items Assist x 1;Verbal cues; Tactile cues   Assistive Device SPC   Distance 10 feet in pt's room;5L O2 SAO2 93% with amb but pt max SOB - Pt amb from bathroom to bed  Pt needed extended rest to recover from max SOB     Balance   Static Sitting Fair +   Static Standing Fair -  (w cane)   Dynamic Standing Poor +  (w cane)   Ambulatory Poor +  (w cane) Activity Tolerance   Activity Tolerance Patient limited by fatigue  (and SOB)   Assessment   Prognosis Good   Problem List Decreased strength;Decreased range of motion;Decreased endurance; Impaired balance;Decreased mobility; Decreased coordination;Decreased safety awareness   Assessment Patient seen for Physical Therapy evaluation  Patient admitted with Acute on chronic respiratory failure with hypoxia ([facility] [address] )  Comorbidities affecting patient's physical performance include: AAT deficiency, GERD, BPH, centrilobular emphysema, pul HTN, CRF w hypoxia  Personal factors affecting patient at time of initial evaluation include: lives in one story house, ambulating with assistive device, stairs to enter home, inability to navigate community distances, inability to navigate level surfaces without external assistance, inability to perform dynamic tasks in community and limited home support  Prior to admission, patient was independent with functional mobility with cane or RW, independent with functional mobility without assistive device, independent with ADLS, requiring assist for IADLS, living alone in one story home with 2 steps to enter, ambulating household distance and ambulating community distances  Please find objective findings from Physical Therapy assessment regarding body systems outlined above with impairments and limitations including weakness, decreased ROM, impaired balance, decreased endurance, impaired coordination, gait deviations, decreased activity tolerance, decreased functional mobility tolerance, decreased safety awareness, fall risk and SOB upon exertion  The Barthel Index was used as a functional outcome tool presenting with a score of 50 today indicating marked limitations of functional mobility and ADLS    Patient's clinical presentation is currently unstable/unpredictable as seen in patient's presentation of vital sign response, increased fall risk, new onset of impairment of functional mobility, decreased endurance and new onset of weakness  Pt would benefit from continued Physical Therapy treatment to address deficits as defined above and maximize level of functional mobility  As demonstrated by objective findings, the assigned level of complexity for this evaluation is high  Goals   Patient Goals go home   STG Expiration Date 12/11/19   Short Term Goal #1 trans - S; pt will amb w/wout cane or RW functional household distances - S   Short Term Goal #2 balance with AD - F/F+ for safe mobility and to decrease fall risk; up/down 2 steps - min A so pt can enter/exit his home   LTG Expiration Date 12/18/19   Long Term Goal #1 trans - I; pt will amb w/wout cane or RW functional household and community distances - I   Long Term Goal #2 balance w/wout AD - F+/G for safe mobility and to decrease fall risk; strength LEs 4- to 4/5; up/down 2 steps - I so pt can enter/exit his home   Plan   Treatment/Interventions ADL retraining;Functional transfer training;LE strengthening/ROM; Elevations; Therapeutic exercise; Endurance training;Patient/family training;Equipment eval/education; Bed mobility;Gait training; Compensatory technique education   PT Frequency 5x/wk   Recommendation   Recommendation Short-term skilled PT   Equipment Recommended   (pt has a RW and cane)   Additional Comments Recommended use of RW for ambulation at this time due to max SOB and pt needing min A with cane, but pt declined and wants to cont amb with cane, but will consider RW next PT visit  Benefits of use of RW explained to pt     Barthel Index   Feeding 10   Bathing 0   Grooming Score 0   Dressing Score 5   Bladder Score 10   Bowels Score 10   Toilet Use Score 5   Transfers (Bed/Chair) Score 10   Mobility (Level Surface) Score 0   Stairs Score 0   Barthel Index Score 48   Licensure   [place] License Number  Yissel Jacobs [place], [place] [id number]     Time In:1105  Time Out:1115  Total Time: 10 mins      S:  "I'm really out of breath this time"  O:  Pt trans sit to stand with min A  Pt amb with cane 15 feet in room with change in direction and min A;pt on 5L O2, SAO2 at rest 97% and at end of amb 93% and pt max short of breath  Pt trans stand to sit with min A   A:  Pt will cont to benefit from skilled PT services to increase pt's strength, mobility, gait and endurance  P:  Cont per PT POC  DCP - short term skilled PT      Mimi Adel, [place]   [phone]

## 2019-12-04 NOTE — PLAN OF CARE
Problem: Potential for Falls  Goal: Patient will remain free of falls  Description  INTERVENTIONS:  - Assess patient frequently for physical needs  -  Identify cognitive and physical deficits and behaviors that affect risk of falls  -  [place] fall precautions as indicated by assessment   - Educate patient/family on patient safety including physical limitations  - Instruct patient to call for assistance with activity based on assessment  - Modify environment to reduce risk of injury  - Consider OT/PT consult to assist with strengthening/mobility  Outcome: Progressing  Note:   Patient prefers to ambulate to the bathroom despite shortness of breath  Fall precautions observed

## 2019-12-04 NOTE — ED PROVIDER NOTES
History  Chief Complaint   Patient presents with    Shortness of Breath     pt c/o chronic SOB worsening over past few days  saw PCP today & sent to ER for evaluation  77-year-old male with past medical history of COPD on 2 5 L nasal cannula oxygen at home, emphysema, arthritis, hypertension, BPH, basal cell cancer of neck, presents to the ED for evaluation of COPD exacerbation  Patient started to have cough and increasing shortness of breath about a week ago  Patient called his PCP and was started on prednisone taper  Patient states that his symptoms are worsening with prednisone taper  Patient was seen by PCP earlier today and was sent to the ED for further evaluation  Upon arrival to the ED patient complains of difficulty taking in deep breath  Patient has had some dry cough  Patient denies any fevers or chills  Patient states that his inhalers are not helping with his shortness of breath at home  Patient is requiring more oxygen at home  History provided by:  Patient and relative  Shortness of Breath   Associated symptoms: cough    Associated symptoms: no abdominal pain, no chest pain, no fever, no headaches, no sore throat, no vomiting and no wheezing        Prior to Admission Medications   Prescriptions Last Dose Informant Patient Reported? Taking?    OXYGEN-HELIUM IN 12/3/2019 at Unknown time Self Yes Yes   Sig: Inhale 2L at rest- 3L with activity   VENTOLIN  (90 Base) MCG/ACT inhaler 12/3/2019 at Unknown time  No Yes   Sig: INHALE TWO PUFFS BY MOUTH FOUR TIMES DAILY    ZEMAIRA injection Past Week at Unknown time Self Yes Yes   Sig: Once a week-   albuterol (2 5 mg/3 mL) 0 083 % nebulizer solution 12/3/2019 at Unknown time Self Yes Yes   Sig: Inhale 1 each every 4 (four) hours as needed   amLODIPine (NORVASC) 10 mg tablet 12/3/2019 at Unknown time Self No Yes   Sig: TAKE ONE TABLET BY MOUTH ONE TIME DAILY    bisacodyl (DULCOLAX) 5 mg EC tablet 12/3/2019 at Unknown time  No Yes Sig: Take 2 tablets (10 mg total) by mouth once for 1 dose Per office instructions   budesonide (PULMICORT) 0 5 mg/2 mL nebulizer solution 12/3/2019 at Unknown time  No Yes   Sig: Take 1 vial (0 5 mg total) by nebulization 2 (two) times a day for 5 days Rinse mouth after use     colestipol (COLESTID) 1 g tablet Unknown at Unknown time Self No No   Sig: Take 1 tablet (1 g total) by mouth 2 (two) times a day   finasteride (PROSCAR) 5 mg tablet 12/3/2019 at Unknown time Self Yes Yes   Sig: Take 5 mg by mouth every morning     fluticasone (FLONASE) 50 mcg/act nasal spray 12/3/2019 at Unknown time Self No Yes   Sig: USE TWO SPRAYS IN EACH NOSTRIL EVERY DAY    formoterol (PERFOROMIST) 20 MCG/2ML nebulizer solution 12/3/2019 at Unknown time Self Yes Yes   Sig: Take 20 mcg by nebulization 2 (two) times a day   gabapentin (NEURONTIN) 300 mg capsule 12/3/2019 at Unknown time Self No Yes   Sig: Take 1 capsule (300 mg total) by mouth 3 (three) times a day   ipratropium-albuterol (DUO-NEB) 0 5-2 5 mg/3 mL nebulizer solution 12/3/2019 at Unknown time Self No Yes   Sig: Take 1 vial (3 mL total) by nebulization 4 (four) times a day   pantoprazole (PROTONIX) 40 mg tablet 12/3/2019 at Unknown time Self No Yes   Sig: Take 1 tablet (40 mg total) by mouth 2 (two) times a day   polyethylene glycol (GOLYTELY) 4000 mL solution   No No   Sig: Take 4,000 mL by mouth once for 1 dose Drink over 3-4 hours starting at 4 PM the evening before your procedure   predniSONE 20 mg tablet 12/3/2019 at Unknown time Self No Yes   Sig: Take 1 tablet (20 mg total) by mouth daily Two pills daily x5 days then 1 pill daily for 30 days   ranitidine (ZANTAC) 150 mg tablet Not Taking at Unknown time Self No No   Sig: Take 1 tablet (150 mg total) by mouth daily   Patient not taking: Reported on 11/11/2019   tamsulosin (FLOMAX) 0 4 mg 12/3/2019 at Unknown time Self No Yes   Sig: TAKE ONE CAPSULE BY MOUTH ONE TIME DAILY    zolpidem (AMBIEN) 10 mg tablet 12/2/2019 at Unknown time Self No Yes   Sig: TAKE ONE TABLET BY MOUTH NIGHTLY AT BEDTIME       Facility-Administered Medications: None       Past Medical History:   Diagnosis Date    Anesthesia complication     Difficult to wake up    Arthritis     BPH (benign prostatic hyperplasia)     urinary frequency    Cancer (HCC)     basal cell neck, face    COPD (chronic obstructive pulmonary disease) (HCC)     Full dentures     Hiatal hernia     History of methicillin resistant staphylococcus aureus (MRSA)     10/11/2018 MRSA (nares) positive    Hypertension     controlled    Irritable bowel syndrome     Kidney stone     at least 7 episodes    Liver disease     Alpha 1- enzyme deficiency - diagnosed 2002  has been on weekly replacement therapy since then    Pulmonary emphysema ([facility] [address] )     1/25/15  FEV1 - 2 45 liters or 59% of predicted    RSV infection 12/2017    Wears glasses     for driving only       Past Surgical History:   Procedure Laterality Date    BACK SURGERY  2008    discectomy    COLONOSCOPY      COLONOSCOPY N/A 3/10/2017    Procedure: Dillan Otto;  Surgeon: Mingo Barfield MD;  Location: [place] [address] GI LAB; Service:    Star Tiago      removal of kidney stones    ESOPHAGOGASTRODUODENOSCOPY N/A 3/10/2017    Procedure: ESOPHAGOGASTRODUODENOSCOPY (EGD); Surgeon: Mingo Barfield MD;  Location: [facility] GI LAB; Service:     LITHOTRIPSY      [place] ESOPHAGOGASTRODUODENOSCOPY TRANSORAL DIAGNOSTIC N/A 11/20/2018    Procedure: ESOPHAGOGASTRODUODENOSCOPY (EGD); Surgeon: Mingo Barfield MD;  Location: [facility] GI LAB; Service: Gastroenterology    TONSILLECTOMY      VEIN LIGATION AND STRIPPING Bilateral     17's       Family History   Problem Relation Age of Onset    Emphysema Mother         never smoked    Emphysema Father     Cancer Brother         colon    Colon cancer Brother     Ulcerative colitis Family     Liver disease Family      I have reviewed and agree with the history as documented      Social History     Tobacco Use    Smoking status: Former Smoker     Packs/day: 1 00     Years: 60 00     Pack years: 60 00     Last attempt to quit: 2017     Years since quittin 2    Smokeless tobacco: Never Used    Tobacco comment: quit in 2017   Substance Use Topics    Alcohol use: Not Currently     Comment: stopped in     Drug use: No        Review of Systems   Constitutional: Negative for activity change, fatigue and fever  HENT: Negative for congestion, ear discharge and sore throat  Eyes: Negative for pain and redness  Respiratory: Positive for cough and shortness of breath  Negative for chest tightness and wheezing  Cardiovascular: Negative for chest pain  Gastrointestinal: Negative for abdominal pain, diarrhea, nausea and vomiting  Endocrine: Negative for cold intolerance  Genitourinary: Negative for dysuria and urgency  Musculoskeletal: Negative for arthralgias and back pain  Neurological: Negative for dizziness, weakness and headaches  Psychiatric/Behavioral: Negative for agitation and behavioral problems  Physical Exam  Physical Exam   Constitutional: He is oriented to person, place, and time  He appears well-developed and well-nourished  HENT:   Head: Normocephalic and atraumatic  Nose: Nose normal    Mouth/Throat: Oropharynx is clear and moist    Eyes: Conjunctivae and EOM are normal    Neck: Normal range of motion  Neck supple  Cardiovascular: Normal rate, regular rhythm and normal heart sounds  Pulmonary/Chest: Effort normal  He has decreased breath sounds  He has wheezes  Decreased breath sound noted bilateral   Scattered wheezing noted bilateral    Abdominal: Soft  Bowel sounds are normal  He exhibits no distension  There is no tenderness  Musculoskeletal: Normal range of motion  Neurological: He is alert and oriented to person, place, and time  Skin: Skin is warm  Psychiatric: He has a normal mood and affect   His behavior is normal  Judgment and thought content normal    Nursing note and vitals reviewed        Vital Signs  ED Triage Vitals [12/03/19 1116]   Temperature Pulse Respirations Blood Pressure SpO2   99 5 °F (37 5 °C) 84 22 (!) 197/96 97 %      Temp Source Heart Rate Source Patient Position - Orthostatic VS BP Location FiO2 (%)   Tympanic Monitor Sitting Left arm --      Pain Score       No Pain           Vitals:    12/03/19 1345 12/03/19 1451 12/03/19 1554 12/03/19 2011   BP: 161/77 (!) 180/85 161/73 152/70   Pulse: 88 102 95 93   Patient Position - Orthostatic VS:  Sitting  Lying         Visual Acuity      ED Medications  Medications   sodium chloride 0 9 % infusion (75 mL/hr Intravenous Rate/Dose Change 12/3/19 1619)   amLODIPine (NORVASC) tablet 10 mg (10 mg Oral Given 12/3/19 1616)   budesonide (PULMICORT) inhalation solution 0 5 mg (0 5 mg Nebulization Given 12/3/19 2058)   finasteride (PROSCAR) tablet 5 mg (has no administration in time range)   formoterol (PERFOROMIST) nebulizer solution 20 mcg (20 mcg Nebulization Given 12/3/19 2058)   gabapentin (NEURONTIN) capsule 300 mg (300 mg Oral Given 12/3/19 1616)   pantoprazole (PROTONIX) EC tablet 40 mg (40 mg Oral Given 12/3/19 1716)   tamsulosin (FLOMAX) capsule 0 4 mg (0 4 mg Oral Given 12/3/19 1616)   zolpidem (AMBIEN) tablet 10 mg (has no administration in time range)   enoxaparin (LOVENOX) subcutaneous injection 40 mg (40 mg Subcutaneous Given 12/3/19 1615)   methylPREDNISolone sodium succinate (Solu-MEDROL) injection 60 mg (has no administration in time range)   azithromycin (ZITHROMAX) 500 mg in sodium chloride 0 9 % 250 mL IVPB (has no administration in time range)   ipratropium-albuterol (DUO-NEB) 0 5-2 5 mg/3 mL inhalation solution 3 mL (3 mL Nebulization Given 12/3/19 2058)   ipratropium-albuterol (DUO-NEB) 0 5-2 5 mg/3 mL inhalation solution 3 mL (has no administration in time range)   ipratropium (ATROVENT) 0 02 % inhalation solution 0 5 mg (0 5 mg Nebulization Given 12/3/19 1139)   albuterol inhalation solution 5 mg (5 mg Nebulization Given 12/3/19 1139)   methylPREDNISolone sodium succinate (Solu-MEDROL) injection 60 mg (60 mg Intravenous Given 12/3/19 1142)   albuterol inhalation solution 5 mg (5 mg Nebulization Given 12/3/19 1201)   ipratropium (ATROVENT) 0 02 % inhalation solution 0 5 mg (0 5 mg Nebulization Given 12/3/19 1201)   azithromycin (ZITHROMAX) 500 mg in sodium chloride 0 9% 250mL IVPB 500 mg (500 mg Intravenous New Bag 12/3/19 1321)   albuterol inhalation solution 5 mg (5 mg Nebulization Given 12/3/19 1348)   bisacodyl (DULCOLAX) EC tablet 10 mg (10 mg Oral Given 12/3/19 1616)       Diagnostic Studies  Results Reviewed     Procedure Component Value Units Date/Time    UA w Reflex to Microscopic w Reflex to Culture [[id number]]  (Abnormal) Collected:  12/03/19 1845    Lab Status:  Final result Specimen:  Urine, Clean Catch Updated:  12/03/19 1858     Color, UA Yellow     Clarity, UA Clear     Specific Gravity, UA >=1 030     pH, UA 5 0     Leukocytes, UA Negative     Nitrite, UA Negative     Protein, UA Negative mg/dl      Glucose,  (1/2%) mg/dl      Ketones, UA Negative mg/dl      Urobilinogen, UA 0 2 E U /dl      Bilirubin, UA Negative     Blood, UA Negative    Blood gas, arterial [[id number]]  (Abnormal) Collected:  12/03/19 1247    Lab Status:  Final result Specimen:  Blood, Arterial from Radial, Left Updated:  12/03/19 1257     pH, Arterial 7 411     PH ART TC 7 412     pCO2, Arterial 33 7 mm Hg      PCO2 (TC) Arterial 33 6 mm Hg      pO2, Arterial 77 3 mm Hg      PO2 (TC) Arterial 76 8 mm Hg      HCO3, Arterial 20 9 mmol/L      Base Excess, Arterial -2 8 mmol/L      O2 Content, Arterial 20 3 mL/dL      O2 HGB,Arterial  94 7 %      SOURCE Radial, Left     Temperature 98 5 Degrees Fehrenheit      Nasal Cannula 3    CBC and differential [[id number]]  (Abnormal) Collected:  12/03/19 1140    Lab Status:  Final result Specimen:  Blood from Arm, Left Updated: 12/03/19 1218     WBC 10 32 Thousand/uL      RBC 4 56 Million/uL      Hemoglobin 15 0 g/dL      Hematocrit 43 3 %      MCV 95 fL      MCH 32 9 pg      MCHC 34 6 g/dL      RDW 13 2 %      MPV 9 5 fL      Platelets 678 Thousands/uL      nRBC 0 /100 WBCs     Narrative: This is an appended report  These results have been appended to a previously verified report      NT-BNP PRO [[id number]]  (Normal) Collected:  12/03/19 1140    Lab Status:  Final result Specimen:  Blood from Arm, Left Updated:  12/03/19 1215     NT-proBNP 101 pg/mL     Magnesium [[id number]]  (Normal) Collected:  12/03/19 1140    Lab Status:  Final result Specimen:  Blood from Arm, Left Updated:  12/03/19 1215     Magnesium 1 9 mg/dL     Phosphorus [[id number]]  (Normal) Collected:  12/03/19 1140    Lab Status:  Final result Specimen:  Blood from Arm, Left Updated:  12/03/19 1215     Phosphorus 2 6 mg/dL     Troponin I [[id number]]  (Normal) Collected:  12/03/19 1140    Lab Status:  Final result Specimen:  Blood from Arm, Left Updated:  12/03/19 1212     Troponin I <0 02 ng/mL     Protime-INR [[id number]]  (Normal) Collected:  12/03/19 1140    Lab Status:  Final result Specimen:  Blood from Arm, Left Updated:  12/03/19 1207     Protime 10 6 seconds      INR 0 99    APTT [[id number]]  (Abnormal) Collected:  12/03/19 1140    Lab Status:  Final result Specimen:  Blood from Arm, Left Updated:  12/03/19 1207     PTT 23 seconds     Comprehensive metabolic panel [[id number]]  (Abnormal) Collected:  12/03/19 1140    Lab Status:  Final result Specimen:  Blood from Arm, Left Updated:  12/03/19 1207     Sodium 139 mmol/L      Potassium 4 1 mmol/L      Chloride 103 mmol/L      CO2 26 mmol/L      ANION GAP 10 mmol/L      BUN 21 mg/dL      Creatinine 1 09 mg/dL      Glucose 115 mg/dL      Calcium 8 8 mg/dL      AST 49 U/L       U/L      Alkaline Phosphatase 68 U/L      Total Protein 6 9 g/dL      Albumin 3 7 g/dL      Total Bilirubin 1 00 mg/dL      eGFR 66 ml/min/1 73sq m     Narrative:       National Kidney Disease Foundation guidelines for Chronic Kidney Disease (CKD):     Stage 1 with normal or high GFR (GFR > 90 mL/min/1 73 square meters)    Stage 2 Mild CKD (GFR = 60-89 mL/min/1 73 square meters)    Stage 3A Moderate CKD (GFR = 45-59 mL/min/1 73 square meters)    Stage 3B Moderate CKD (GFR = 30-44 mL/min/1 73 square meters)    Stage 4 Severe CKD (GFR = 15-29 mL/min/1 73 square meters)    Stage 5 End Stage CKD (GFR <15 mL/min/1 73 square meters)  Note: GFR calculation is accurate only with a steady state creatinine                 CT chest without contrast   Final Result by Gurinder Paul MD (12/03 8952)      Stable lower lobe predominant severe pulmonary emphysema in a pattern consistent with this patient's history of alpha-1 antitrypsin deficiency  No acute findings  Workstation performed: [id number]         XR chest 1 view portable   Final Result by Brendon Chiang MD (12/03 5415)      No acute cardiopulmonary disease  Workstation performed: [id number]                    Procedures  ECG 12 Lead Documentation Only  Date/Time: 12/3/2019 11:20 AM  Performed by: Anamaria Watts DO  Authorized by: Anamaria Watts DO     Indications / Diagnosis:  Shortness of breath  ECG reviewed by me, the ED Provider: yes    Patient location:  ED  Previous ECG:     Comparison to cardiac monitor: Yes    Interpretation:     Interpretation: normal    Comments:      Sinus rhythm, rate 80, normal axis, normal axis, no acute ST/T-wave abnormalities noted, otherwise unremarkable EKG  ED Course  ED Course as of Dec 03 2136   Tue Dec 03, 2019   1204 Pre treatment peak flow 260        1222 Post treatment peak flow after 2 neb treatment was 300        1240 Patient re-examined at bedside  Patient continues to feel short of breath after 2 neb treatments even though his peak flows improved    At this time will obtain ABG, CT chest without contrast, and admit patient  MDM  Number of Diagnoses or Management Options  COPD with acute exacerbation [facility]):   Diagnosis management comments: Obtain blood work, EKG, chest  Give IV steroids, neb treatment and reassess symptoms       Amount and/or Complexity of Data Reviewed  Clinical lab tests: ordered and reviewed  Tests in the radiology section of CPT®: ordered and reviewed  Tests in the medicine section of CPT®: ordered and reviewed  Review and summarize past medical records: yes  Independent visualization of images, tracings, or specimens: yes    Risk of Complications, Morbidity, and/or Mortality  General comments: Patient's blood work showed mild elevation white count  Patient's chest x-ray was equivocal therefore noncontrast CT chest was obtained that showed stable lower lobe predominant severe pulmonary emphysema in a pattern consistent with this patient's history of alpha-1 antitrypsin deficiency  Patient continued to feel short of breath despite 2 neb treatment and steroid in the ED  At this point patient is admitted for COPD and emphysema exacerbation  Patient and family agrees with admission plans  Patient Progress  Patient progress: stable        Disposition  Final diagnoses:   COPD with acute exacerbation ([facility] [address] )     Time reflects when diagnosis was documented in both MDM as applicable and the Disposition within this note     Time User Action Codes Description Comment    12/3/2019  1:15 PM Loida Oscar Add [J44 1] COPD with acute exacerbation ([facility] [address] )     12/3/2019  4:07 PM Cassy Castillo Add [E88 01] AAT (alpha-1-antitrypsin) deficiency [facility])       ED Disposition     ED Disposition Condition Date/Time Comment    Admit Stable Tue Dec 3, 2019  1:15 PM Case was discussed with Dr Haim Galvan and the patient's admission status was agreed to be Admission Status: observation status to the service of Dr Haim Galvan          Follow-up Information    None         Current Discharge Medication List      CONTINUE these medications which have NOT CHANGED    Details   albuterol (2 5 mg/3 mL) 0 083 % nebulizer solution Inhale 1 each every 4 (four) hours as needed      amLODIPine (NORVASC) 10 mg tablet TAKE ONE TABLET BY MOUTH ONE TIME DAILY   Qty: 30 tablet, Refills: 4    Associated Diagnoses: Essential hypertension      bisacodyl (DULCOLAX) 5 mg EC tablet Take 2 tablets (10 mg total) by mouth once for 1 dose Per office instructions  Qty: 2 tablet, Refills: 0    Associated Diagnoses: Chronic diarrhea      budesonide (PULMICORT) 0 5 mg/2 mL nebulizer solution Take 1 vial (0 5 mg total) by nebulization 2 (two) times a day for 5 days Rinse mouth after use    Qty: 10 vial, Refills: 0    Associated Diagnoses: Centrilobular emphysema (HCC)      finasteride (PROSCAR) 5 mg tablet Take 5 mg by mouth every morning        fluticasone (FLONASE) 50 mcg/act nasal spray USE TWO SPRAYS IN EACH NOSTRIL EVERY DAY   Qty: 16 g, Refills: 4    Associated Diagnoses: Rhinitis, unspecified type      formoterol (PERFOROMIST) 20 MCG/2ML nebulizer solution Take 20 mcg by nebulization 2 (two) times a day      gabapentin (NEURONTIN) 300 mg capsule Take 1 capsule (300 mg total) by mouth 3 (three) times a day  Qty: 90 capsule, Refills: 3    Associated Diagnoses: Neuropathy      ipratropium-albuterol (DUO-NEB) 0 5-2 5 mg/3 mL nebulizer solution Take 1 vial (3 mL total) by nebulization 4 (four) times a day  Qty: 120 vial, Refills: 6    Associated Diagnoses: COPD exacerbation (HCC)      OXYGEN-HELIUM IN Inhale 2L at rest- 3L with activity      pantoprazole (PROTONIX) 40 mg tablet Take 1 tablet (40 mg total) by mouth 2 (two) times a day  Qty: 60 tablet, Refills: 5    Associated Diagnoses: Gastroesophageal reflux disease without esophagitis      predniSONE 20 mg tablet Take 1 tablet (20 mg total) by mouth daily Two pills daily x5 days then 1 pill daily for 30 days  Qty: 40 tablet, Refills: 0    Associated Diagnoses: COPD exacerbation (HCC)      tamsulosin (FLOMAX) 0 4 mg TAKE ONE CAPSULE BY MOUTH ONE TIME DAILY   Qty: 30 capsule, Refills: 2    Associated Diagnoses: Benign prostatic hyperplasia with urinary frequency      VENTOLIN  (90 Base) MCG/ACT inhaler INHALE TWO PUFFS BY MOUTH FOUR TIMES DAILY   Qty: 18 g, Refills: 4    Associated Diagnoses: Chronic obstructive pulmonary disease, unspecified COPD type ([facility] [address] )      ZEMAIRA injection Once a week-      zolpidem (AMBIEN) 10 mg tablet TAKE ONE TABLET BY MOUTH NIGHTLY AT BEDTIME   Qty: 30 tablet, Refills: 5    Associated Diagnoses: Primary insomnia      colestipol (COLESTID) 1 g tablet Take 1 tablet (1 g total) by mouth 2 (two) times a day  Qty: 60 tablet, Refills: 5    Associated Diagnoses: Chronic diarrhea      polyethylene glycol (GOLYTELY) 4000 mL solution Take 4,000 mL by mouth once for 1 dose Drink over 3-4 hours starting at 4 PM the evening before your procedure  Qty: 4000 mL, Refills: 0    Associated Diagnoses: Chronic diarrhea      ranitidine (ZANTAC) 150 mg tablet Take 1 tablet (150 mg total) by mouth daily  Qty: 30 tablet, Refills: 3    Associated Diagnoses: Gastroesophageal reflux disease without esophagitis           No discharge procedures on file      ED Provider  Electronically Signed by           Adilene Eller DO  12/03/19 9465

## 2019-12-04 NOTE — PROGRESS NOTES
[facility] Practice Progress Note - Rocklin Cushing 75 y o  male MRN: [id number]    Unit/Bed#: [address] [place] [address] Encounter: [id number]      Assessment/Plan:  * Acute on chronic respiratory failure with hypoxia (HCC)  Assessment & Plan  Stable on home 3L NC O2, titrate oxygen as needed  ABG:  PH 7 41, pCO2 33 7, PO2 77 3, H CO3 20 9  Likely secondary to worsening lung function  CT chest:Stable lower lobe predominant severe pulmonary emphysema and hepatic consistent with patient's history of alpha-1 antitrypsin deficiency  Chest x-ray:  No acute cardiopulmonary disease  Solumedrol 60 q 8h IV, plan to taper clinically  Budesonide nebs b i d  Performist nebs b i d   DuoNeb q 6h rest and q 4h p r n  Continue azithromycin 500 milligram IV daily, IV NS 75 cc/hour  -repeat ABG 12/4:  7 38/36/101/21  -await pulmonary recommendations      AAT (alpha-1-antitrypsin) deficiency ([address] )  Assessment & Plan  Follows with Pulmonary  Continue with weekly Zemaira injections, budesonide, and performist nebs  -pulmonary consult appreciate recommendations    Transaminitis  Assessment & Plan  AST 49,  present on admission  Likely secondary to alpha-1 antitrypsin deficiency  Continue to monitor daily CMP    Centrilobular emphysema (HCC)  Assessment & Plan  Continue with budesonide and Perforomist nebs    BPH (benign prostatic hyperplasia)  Assessment & Plan  Stable continue with tamsulosin and finasteride    Essential hypertension  Assessment & Plan  Stable continue with amlodipine 10 milligrams daily  Monitor frequent vital signs    Gastroesophageal reflux disease  Assessment & Plan  Continue with pantoprazole and Zantac      GLOBAL:  SCDs and Lovenox  Regular diet  IV NS 75 cc/hour  Replete electrolytes as needed    Subjective:   Patient seen examined bedside, no acute events overnight  Patient states that he feels slightly better, I have informed that we will wait for pulmonology recommendations    I discussed his morning labs ABG with him stating that they are relatively same not worsening  Patient denies any acute chest pain, or severe shortness of breath  Continuing home 3 L oxygen, breathing treatments    Objective:     Vitals: Blood pressure (!) 172/78, pulse 85, temperature 98 7 °F (37 1 °C), temperature source Oral, resp  rate 20, height 6' 5" (1 956 m), weight 90 3 kg (199 lb), SpO2 97 %  ,Body mass index is 23 6 kg/m²  Wt Readings from Last 3 Encounters:   12/03/19 90 3 kg (199 lb)   12/03/19 90 7 kg (199 lb 14 4 oz)   11/27/19 90 7 kg (200 lb)       Intake/Output Summary (Last 24 hours) at 12/4/2019 0826  Last data filed at 12/4/2019 0600  Gross per 24 hour   Intake 2226 25 ml   Output 770 ml   Net 1456 25 ml       Physical Exam:     General: Pt is AAO x 3, not in any acute distress  Cardio: RRR, S1 and S2 +, no murmurs/rubs/gallops/clicks  Resp:  Extensively diminished breath sounds bilaterally, however no wheezes or rhonchi  Abdomen: soft, NT/ND, BS+  Extremities: no cyanosis or edema  PP 2+ b/l         Recent Results (from the past 24 hour(s))   CBC and differential    Collection Time: 12/03/19 11:40 AM   Result Value Ref Range    WBC 10 32 (H) 4 31 - 10 16 Thousand/uL    RBC 4 56 3 88 - 5 62 Million/uL    Hemoglobin 15 0 12 0 - 17 0 g/dL    Hematocrit 43 3 36 5 - 49 3 %    MCV 95 82 - 98 fL    MCH 32 9 26 8 - 34 3 pg    MCHC 34 6 31 4 - 37 4 g/dL    RDW 13 2 11 6 - 15 1 %    MPV 9 5 8 9 - 12 7 fL    Platelets 743 (L) 581 - 390 Thousands/uL    nRBC 0 /100 WBCs   Protime-INR    Collection Time: 12/03/19 11:40 AM   Result Value Ref Range    Protime 10 6 9 8 - 12 0 seconds    INR 0 99 0 91 - 1 09   APTT    Collection Time: 12/03/19 11:40 AM   Result Value Ref Range    PTT 23 (L) 25 - 32 seconds   Comprehensive metabolic panel    Collection Time: 12/03/19 11:40 AM   Result Value Ref Range    Sodium 139 136 - 145 mmol/L    Potassium 4 1 3 5 - 5 3 mmol/L    Chloride 103 100 - 108 mmol/L    CO2 26 21 - 32 mmol/L    ANION GAP 10 4 - 13 mmol/L    BUN 21 5 - 25 mg/dL    Creatinine 1 09 0 60 - 1 30 mg/dL    Glucose 115 65 - 140 mg/dL    Calcium 8 8 8 3 - 10 1 mg/dL    AST 49 (H) 5 - 45 U/L     (H) 12 - 78 U/L    Alkaline Phosphatase 68 46 - 116 U/L    Total Protein 6 9 6 4 - 8 2 g/dL    Albumin 3 7 3 5 - 5 0 g/dL    Total Bilirubin 1 00 0 20 - 1 00 mg/dL    eGFR 66 ml/min/1 73sq m   Troponin I    Collection Time: 12/03/19 11:40 AM   Result Value Ref Range    Troponin I <0 02 <=0 04 ng/mL   NT-BNP PRO    Collection Time: 12/03/19 11:40 AM   Result Value Ref Range    NT-proBNP 101 <450 pg/mL   Magnesium    Collection Time: 12/03/19 11:40 AM   Result Value Ref Range    Magnesium 1 9 1 6 - 2 6 mg/dL   Phosphorus    Collection Time: 12/03/19 11:40 AM   Result Value Ref Range    Phosphorus 2 6 2 3 - 4 1 mg/dL   Manual Differential(PHLEBS Do Not Order)    Collection Time: 12/03/19 11:40 AM   Result Value Ref Range    Segmented % 75 43 - 75 %    Bands % 1 0 - 8 %    Lymphocytes % 16 14 - 44 %    Monocytes % 7 4 - 12 %    Eosinophils, % 1 0 - 6 %    Basophils % 0 0 - 1 %    Absolute Neutrophils 7 84 (H) 1 85 - 7 62 Thousand/uL    Lymphocytes Absolute 1 65 0 60 - 4 47 Thousand/uL    Monocytes Absolute 0 72 0 00 - 1 22 Thousand/uL    Eosinophils Absolute 0 10 0 00 - 0 40 Thousand/uL    Basophils Absolute 0 00 0 00 - 0 10 Thousand/uL    Total Counted 100     RBC Morphology Normal     Platelet Estimate Borderline (A) Adequate   Blood gas, arterial    Collection Time: 12/03/19 12:47 PM   Result Value Ref Range    pH, Arterial 7 411 7 350 - 7 450    PH ART TC 7 412 7 350 - 7 450    pCO2, Arterial 33 7 (L) 36 0 - 44 0 mm Hg    PCO2 (TC) Arterial 33 6 (L) 36 0 - 44 0 mm Hg    pO2, Arterial 77 3 75 0 - 129 0 mm Hg    PO2 (TC) Arterial 76 8 75 0 - 129 0 mm Hg    HCO3, Arterial 20 9 (L) 22 0 - 28 0 mmol/L    Base Excess, Arterial -2 8 mmol/L    O2 Content, Arterial 20 3 16 0 - 23 0 mL/dL    O2 HGB,Arterial  94 7 94 0 - 97 0 %    SOURCE Radial, Left Temperature 98 5 Degrees Fehrenheit    Nasal Cannula 3    UA w Reflex to Microscopic w Reflex to Culture    Collection Time: 12/03/19  6:45 PM   Result Value Ref Range    Color, UA Yellow     Clarity, UA Clear     Specific Gravity, UA >=1 030 1 000 - 1 030    pH, UA 5 0 5 0, 5 5, 6 0, 6 5, 7 0, 7 5, 8 0, 8 5, 9 0    Leukocytes, UA Negative Negative    Nitrite, UA Negative Negative    Protein, UA Negative Negative mg/dl    Glucose,  (1/2%) (A) Negative mg/dl    Ketones, UA Negative Negative mg/dl    Urobilinogen, UA 0 2 0 2, 1 0 E U /dl E U /dl    Bilirubin, UA Negative Negative    Blood, UA Negative Negative   Comprehensive metabolic panel    Collection Time: 12/04/19  4:52 AM   Result Value Ref Range    Sodium 138 136 - 145 mmol/L    Potassium 4 2 3 5 - 5 3 mmol/L    Chloride 103 100 - 108 mmol/L    CO2 23 21 - 32 mmol/L    ANION GAP 12 4 - 13 mmol/L    BUN 20 5 - 25 mg/dL    Creatinine 1 12 0 60 - 1 30 mg/dL    Glucose 185 (H) 65 - 140 mg/dL    Glucose, Fasting 185 (H) 65 - 99 mg/dL    Calcium 8 3 8 3 - 10 1 mg/dL    AST 39 5 - 45 U/L     (H) 12 - 78 U/L    Alkaline Phosphatase 60 46 - 116 U/L    Total Protein 6 1 (L) 6 4 - 8 2 g/dL    Albumin 3 2 (L) 3 5 - 5 0 g/dL    Total Bilirubin 0 60 0 20 - 1 00 mg/dL    eGFR 64 ml/min/1 73sq m   Magnesium    Collection Time: 12/04/19  4:52 AM   Result Value Ref Range    Magnesium 1 8 1 6 - 2 6 mg/dL   Phosphorus    Collection Time: 12/04/19  4:52 AM   Result Value Ref Range    Phosphorus 3 6 2 3 - 4 1 mg/dL   CBC (With Platelets)    Collection Time: 12/04/19  4:52 AM   Result Value Ref Range    WBC 11 15 (H) 4 31 - 10 16 Thousand/uL    RBC 4 05 3 88 - 5 62 Million/uL    Hemoglobin 13 3 12 0 - 17 0 g/dL    Hematocrit 38 6 36 5 - 49 3 %    MCV 95 82 - 98 fL    MCH 32 8 26 8 - 34 3 pg    MCHC 34 5 31 4 - 37 4 g/dL    RDW 13 1 11 6 - 15 1 %    Platelets 305 (L) 512 - 390 Thousands/uL    MPV 8 8 (L) 8 9 - 12 7 fL   Blood gas, arterial    Collection Time: 12/04/19 5:29 AM   Result Value Ref Range    pH, Arterial 7 388 7 350 - 7 450    PH ART TC 7 387 7 350 - 7 450    pCO2, Arterial 35 8 (L) 36 0 - 44 0 mm Hg    PCO2 (TC) Arterial 36 0 36 0 - 44 0 mm Hg    pO2, Arterial 101 3 75 0 - 129 0 mm Hg    PO2 (TC) Arterial 101 9 75 0 - 129 0 mm Hg    HCO3, Arterial 21 1 (L) 22 0 - 28 0 mmol/L    Base Excess, Arterial -3 3 mmol/L    O2 Content, Arterial 18 9 16 0 - 23 0 mL/dL    O2 HGB,Arterial  97 1 (H) 94 0 - 97 0 %    SOURCE Radial, Left     FLORENCE TEST Yes     Temperature 98 7 Degrees Fehrenheit    Nasal Cannula 3        Current Facility-Administered Medications   Medication Dose Route Frequency Provider Last Rate Last Dose    amLODIPine (NORVASC) tablet 10 mg  10 mg Oral Daily Javan Duffy MD   10 mg at 12/03/19 1616    azithromycin (ZITHROMAX) 500 mg in sodium chloride 0 9 % 250 mL IVPB  500 mg Intravenous Q24H Javan Duffy MD        budesonide (PULMICORT) inhalation solution 0 5 mg  0 5 mg Nebulization BID Javan Duffy MD   0 5 mg at 12/04/19 0736    enoxaparin (LOVENOX) subcutaneous injection 40 mg  40 mg Subcutaneous Daily Javan Duffy MD   40 mg at 12/03/19 1615    finasteride (PROSCAR) tablet 5 mg  5 mg Oral QAM Javan Duffy MD        formoterol (PERFOROMIST) nebulizer solution 20 mcg  20 mcg Nebulization BID Javan Duffy MD   20 mcg at 12/04/19 0817    gabapentin (NEURONTIN) capsule 300 mg  300 mg Oral TID Javan Duffy MD   300 mg at 12/03/19 2140    ipratropium-albuterol (DUO-NEB) 0 5-2 5 mg/3 mL inhalation solution 3 mL  3 mL Nebulization Q6H Javan Duffy MD   3 mL at 12/04/19 0736    ipratropium-albuterol (DUO-NEB) 0 5-2 5 mg/3 mL inhalation solution 3 mL  3 mL Nebulization Q4H PRN Javan Duffy MD        methylPREDNISolone sodium succinate (Solu-MEDROL) injection 60 mg  60 mg Intravenous [facility] Javan Duffy MD   60 mg at 12/04/19 0527    pantoprazole (PROTONIX) EC tablet 40 mg  40 mg Oral BID Javan Duffy MD   40 mg at 12/03/19 7627    sodium chloride 0 9 % infusion  75 mL/hr Intravenous Continuous Kyle Almanzar MD 75 mL/hr at 12/03/19 1619 75 mL/hr at 12/03/19 1619    tamsulosin (FLOMAX) capsule 0 4 mg  0 4 mg Oral Daily Kyle Almanzar MD   0 4 mg at 12/03/19 1616    zolpidem (AMBIEN) tablet 10 mg  10 mg Oral HS Kyle Almanzar MD   10 mg at 12/03/19 2140       Invasive Devices     Peripheral Intravenous Line            Peripheral IV 12/03/19 Left Antecubital less than 1 day                Lab, Imaging and other studies: I have personally reviewed pertinent reports      VTE Pharmacologic Prophylaxis: Enoxaparin (Lovenox)  VTE Mechanical Prophylaxis: sequential compression device    Kalee Shields MD

## 2019-12-05 LAB
ALBUMIN SERPL BCP-MCNC: 3.3 G/DL (ref 3.5–5)
ALP SERPL-CCNC: 62 U/L (ref 46–116)
ALT SERPL W P-5'-P-CCNC: 150 U/L (ref 12–78)
ANION GAP SERPL CALCULATED.3IONS-SCNC: 11 MMOL/L (ref 4–13)
AST SERPL W P-5'-P-CCNC: 52 U/L (ref 5–45)
BASOPHILS # BLD AUTO: 0.01 THOUSANDS/ΜL (ref 0–0.1)
BASOPHILS NFR BLD AUTO: 0 % (ref 0–1)
BILIRUB SERPL-MCNC: 0.6 MG/DL (ref 0.2–1)
BUN SERPL-MCNC: 19 MG/DL (ref 5–25)
CALCIUM SERPL-MCNC: 8.5 MG/DL (ref 8.3–10.1)
CHLORIDE SERPL-SCNC: 104 MMOL/L (ref 100–108)
CO2 SERPL-SCNC: 26 MMOL/L (ref 21–32)
CREAT SERPL-MCNC: 1.05 MG/DL (ref 0.6–1.3)
EOSINOPHIL # BLD AUTO: 0 THOUSAND/ΜL (ref 0–0.61)
EOSINOPHIL NFR BLD AUTO: 0 % (ref 0–6)
ERYTHROCYTE [DISTWIDTH] IN BLOOD BY AUTOMATED COUNT: 13.3 % (ref 11.6–15.1)
GFR SERPL CREATININE-BSD FRML MDRD: 69 ML/MIN/1.73SQ M
GLUCOSE SERPL-MCNC: 170 MG/DL (ref 65–140)
HCT VFR BLD AUTO: 40.9 % (ref 36.5–49.3)
HGB BLD-MCNC: 13.7 G/DL (ref 12–17)
IMM GRANULOCYTES # BLD AUTO: 0.19 THOUSAND/UL (ref 0–0.2)
IMM GRANULOCYTES NFR BLD AUTO: 2 % (ref 0–2)
LYMPHOCYTES # BLD AUTO: 0.58 THOUSANDS/ΜL (ref 0.6–4.47)
LYMPHOCYTES NFR BLD AUTO: 6 % (ref 14–44)
MAGNESIUM SERPL-MCNC: 2 MG/DL (ref 1.6–2.6)
MCH RBC QN AUTO: 32.4 PG (ref 26.8–34.3)
MCHC RBC AUTO-ENTMCNC: 33.5 G/DL (ref 31.4–37.4)
MCV RBC AUTO: 97 FL (ref 82–98)
MONOCYTES # BLD AUTO: 0.32 THOUSAND/ΜL (ref 0.17–1.22)
MONOCYTES NFR BLD AUTO: 3 % (ref 4–12)
NEUTROPHILS # BLD AUTO: 9.29 THOUSANDS/ΜL (ref 1.85–7.62)
NEUTS SEG NFR BLD AUTO: 89 % (ref 43–75)
NRBC BLD AUTO-RTO: 0 /100 WBCS
PHOSPHATE SERPL-MCNC: 3 MG/DL (ref 2.3–4.1)
PLATELET # BLD AUTO: 126 THOUSANDS/UL (ref 149–390)
PMV BLD AUTO: 9.5 FL (ref 8.9–12.7)
POTASSIUM SERPL-SCNC: 4.1 MMOL/L (ref 3.5–5.3)
PROT SERPL-MCNC: 6.2 G/DL (ref 6.4–8.2)
RBC # BLD AUTO: 4.23 MILLION/UL (ref 3.88–5.62)
SODIUM SERPL-SCNC: 141 MMOL/L (ref 136–145)
WBC # BLD AUTO: 10.39 THOUSAND/UL (ref 4.31–10.16)

## 2019-12-05 PROCEDURE — 85025 COMPLETE CBC W/AUTO DIFF WBC: CPT | Performed by: STUDENT IN AN ORGANIZED HEALTH CARE EDUCATION/TRAINING PROGRAM

## 2019-12-05 PROCEDURE — 99232 SBSQ HOSP IP/OBS MODERATE 35: CPT | Performed by: FAMILY MEDICINE

## 2019-12-05 PROCEDURE — 94640 AIRWAY INHALATION TREATMENT: CPT

## 2019-12-05 PROCEDURE — 80053 COMPREHEN METABOLIC PANEL: CPT | Performed by: STUDENT IN AN ORGANIZED HEALTH CARE EDUCATION/TRAINING PROGRAM

## 2019-12-05 PROCEDURE — G8987 SELF CARE CURRENT STATUS: HCPCS

## 2019-12-05 PROCEDURE — 83735 ASSAY OF MAGNESIUM: CPT | Performed by: STUDENT IN AN ORGANIZED HEALTH CARE EDUCATION/TRAINING PROGRAM

## 2019-12-05 PROCEDURE — 94760 N-INVAS EAR/PLS OXIMETRY 1: CPT

## 2019-12-05 PROCEDURE — 99233 SBSQ HOSP IP/OBS HIGH 50: CPT | Performed by: INTERNAL MEDICINE

## 2019-12-05 PROCEDURE — 97167 OT EVAL HIGH COMPLEX 60 MIN: CPT

## 2019-12-05 PROCEDURE — G8988 SELF CARE GOAL STATUS: HCPCS

## 2019-12-05 PROCEDURE — 84100 ASSAY OF PHOSPHORUS: CPT | Performed by: STUDENT IN AN ORGANIZED HEALTH CARE EDUCATION/TRAINING PROGRAM

## 2019-12-05 RX ORDER — AZITHROMYCIN 250 MG/1
500 TABLET, FILM COATED ORAL EVERY 24 HOURS
Status: DISCONTINUED | OUTPATIENT
Start: 2019-12-05 | End: 2019-12-09 | Stop reason: HOSPADM

## 2019-12-05 RX ORDER — BUSPIRONE HYDROCHLORIDE 5 MG/1
7.5 TABLET ORAL 3 TIMES DAILY
Status: DISCONTINUED | OUTPATIENT
Start: 2019-12-05 | End: 2019-12-06

## 2019-12-05 RX ADMIN — IPRATROPIUM BROMIDE AND ALBUTEROL SULFATE 3 ML: 2.5; .5 SOLUTION RESPIRATORY (INHALATION) at 07:38

## 2019-12-05 RX ADMIN — ENOXAPARIN SODIUM 40 MG: 40 INJECTION SUBCUTANEOUS at 09:12

## 2019-12-05 RX ADMIN — FINASTERIDE 5 MG: 5 TABLET, FILM COATED ORAL at 09:12

## 2019-12-05 RX ADMIN — SODIUM CHLORIDE 75 ML/HR: 0.9 INJECTION, SOLUTION INTRAVENOUS at 22:24

## 2019-12-05 RX ADMIN — FORMOTEROL FUMARATE DIHYDRATE 20 MCG: 20 SOLUTION RESPIRATORY (INHALATION) at 08:02

## 2019-12-05 RX ADMIN — SODIUM CHLORIDE 75 ML/HR: 0.9 INJECTION, SOLUTION INTRAVENOUS at 09:14

## 2019-12-05 RX ADMIN — BUDESONIDE 0.5 MG: 0.5 INHALANT RESPIRATORY (INHALATION) at 07:38

## 2019-12-05 RX ADMIN — METHYLPREDNISOLONE SODIUM SUCCINATE 40 MG: 40 INJECTION, POWDER, FOR SOLUTION INTRAMUSCULAR; INTRAVENOUS at 22:19

## 2019-12-05 RX ADMIN — IPRATROPIUM BROMIDE AND ALBUTEROL SULFATE 3 ML: 2.5; .5 SOLUTION RESPIRATORY (INHALATION) at 14:30

## 2019-12-05 RX ADMIN — BUDESONIDE 0.5 MG: 0.5 INHALANT RESPIRATORY (INHALATION) at 20:35

## 2019-12-05 RX ADMIN — ZOLPIDEM TARTRATE 10 MG: 5 TABLET, COATED ORAL at 22:15

## 2019-12-05 RX ADMIN — AMLODIPINE BESYLATE 10 MG: 10 TABLET ORAL at 09:13

## 2019-12-05 RX ADMIN — IPRATROPIUM BROMIDE AND ALBUTEROL SULFATE 3 ML: 2.5; .5 SOLUTION RESPIRATORY (INHALATION) at 04:00

## 2019-12-05 RX ADMIN — FORMOTEROL FUMARATE DIHYDRATE 20 MCG: 20 SOLUTION RESPIRATORY (INHALATION) at 20:51

## 2019-12-05 RX ADMIN — BUSPIRONE HYDROCHLORIDE 5 MG: 5 TABLET ORAL at 09:13

## 2019-12-05 RX ADMIN — PANTOPRAZOLE SODIUM 40 MG: 40 TABLET, DELAYED RELEASE ORAL at 17:08

## 2019-12-05 RX ADMIN — GABAPENTIN 300 MG: 300 CAPSULE ORAL at 22:15

## 2019-12-05 RX ADMIN — BUSPIRONE HYDROCHLORIDE 7.5 MG: 5 TABLET ORAL at 22:15

## 2019-12-05 RX ADMIN — TAMSULOSIN HYDROCHLORIDE 0.4 MG: 0.4 CAPSULE ORAL at 09:13

## 2019-12-05 RX ADMIN — GABAPENTIN 300 MG: 300 CAPSULE ORAL at 17:08

## 2019-12-05 RX ADMIN — GABAPENTIN 300 MG: 300 CAPSULE ORAL at 09:12

## 2019-12-05 RX ADMIN — METHYLPREDNISOLONE SODIUM SUCCINATE 40 MG: 40 INJECTION, POWDER, FOR SOLUTION INTRAMUSCULAR; INTRAVENOUS at 09:12

## 2019-12-05 RX ADMIN — IPRATROPIUM BROMIDE AND ALBUTEROL SULFATE 3 ML: 2.5; .5 SOLUTION RESPIRATORY (INHALATION) at 20:35

## 2019-12-05 RX ADMIN — AZITHROMYCIN MONOHYDRATE 500 MG: 250 TABLET ORAL at 13:34

## 2019-12-05 RX ADMIN — PANTOPRAZOLE SODIUM 40 MG: 40 TABLET, DELAYED RELEASE ORAL at 09:13

## 2019-12-05 NOTE — UTILIZATION REVIEW
Initial Clinical Review  Patient was observation status 12/3/19 converted to inpatient admission 12/4/19 for continue treatment  COPD NEEDING STEROIDS    Admission: Date/Time/Statement: Inpatient Admission Orders (From admission, onward)     Ordered        12/04/19 1622  Inpatient Admission  Once                   Orders Placed This Encounter   Procedures    Inpatient Admission     Standing Status:   Standing     Number of Occurrences:   1     Order Specific Question:   Admitting Physician     Answer:   Hilda Bridges     Order Specific Question:   Level of Care     Answer:   Med Surg [16]     Order Specific Question:   Estimated length of stay     Answer:   More than 2 Midnights     Order Specific Question:   Certification     Answer:   I certify that inpatient services are medically necessary for this patient for a duration of greater than two midnights  See H&P and MD Progress Notes for additional information about the patient's course of treatment  ED Arrival Information     Expected Arrival Acuity Means of Arrival Escorted By Service Admission Type    - 12/3/2019 11:11 Emergent Wheelchair Self Hospitalist Emergency    Arrival Complaint    Shortness of Breath        Chief Complaint   Patient presents with    Shortness of Breath     pt c/o chronic SOB worsening over past few days  saw PCP today & sent to ER for evaluation  Assessment/Plan: Impression:  Dyspnea related to his severe COPD possibly with an exacerbation  Thus far not feeling much benefit with corticosteroid therapy given as outpatient and inpatient  No evidence of any recurrent pulmonary embolism  He does have severe emphysema      Patient had been on methylprednisone 60 mg IV every 8 hours  Last dose was this morning  Will change to 40 mg IV b i d  And see if there is any response to therapy    Also can do ambulatory oximetry to determine patient's oxygen requirements with activity     Severe bullous emphysematous COPD:  -FEV1 44%  -chronically on performance, Pulmicort,  -uses duo nebs at home  -has attended pulmonary rehab in the past and has a few lifetime days left available  -will need pulmonary rehab in the outpatient setting     Alpha-1 antitrypsin deficiency:  -is on weekly Zemaira injections     Chronic hypoxemic respiratory failure:  Currently on 4 L nasal cannula  Baseline for this patient  He does not qualify for ventilator therapy     Chronic dyspnea:  -I discussed with Cardiology, they do not feel this is cardiac in nature  -recent echos have been normal  -recent new med stress test were with fixed non reproducible defect  -no suggestions of reversible ischemia  -barring all else likely due to poor reserve     History of Remote PE  -consideration for repeat CTA PE study for evaluation for chronic PE  -not currently on anticoagulation therapy  -completed 6 months A/C therapy  ED Triage Vitals [12/03/19 1116]   Temperature Pulse Respirations Blood Pressure SpO2   99 5 °F (37 5 °C) 84 22 (!) 197/96 97 %      Temp Source Heart Rate Source Patient Position - Orthostatic VS BP Location FiO2 (%)   Tympanic Monitor Sitting Left arm --      Pain Score       No Pain        Wt Readings from Last 1 Encounters:   12/03/19 90 3 kg (199 lb)     Additional Vital Signs:   Pertinent Labs/Diagnostic Test Results:   Results from last 7 days   Lab Units 12/05/19  0513 12/04/19 0452 12/03/19  1140   WBC Thousand/uL 10 39* 11 15* 10 32*   HEMOGLOBIN g/dL 13 7 13 3 15 0   HEMATOCRIT % 40 9 38 6 43 3   PLATELETS Thousands/uL 126* 104* 127*   NEUTROS ABS Thousands/µL 9 29*  --   --    BANDS PCT %  --   --  1         Results from last 7 days   Lab Units 12/05/19  0513 12/04/19  0452 12/03/19  1140   SODIUM mmol/L 141 138 139   POTASSIUM mmol/L 4 1 4 2 4 1   CHLORIDE mmol/L 104 103 103   CO2 mmol/L 26 23 26   ANION GAP mmol/L 11 12 10   BUN mg/dL 19 20 21   CREATININE mg/dL 1 05 1 12 1 09   EGFR ml/min/1 73sq m 69 64 66   CALCIUM mg/dL 8 5 8 3 8 8   MAGNESIUM mg/dL 2 0 1 8 1 9   PHOSPHORUS mg/dL 3 0 3 6 2 6     Results from last 7 days   Lab Units 12/05/19  0513 12/04/19  0452 12/03/19  1140   AST U/L 52* 39 49*   ALT U/L 150* 142* 170*   ALK PHOS U/L 62 60 68   TOTAL PROTEIN g/dL 6 2* 6 1* 6 9   ALBUMIN g/dL 3 3* 3 2* 3 7   TOTAL BILIRUBIN mg/dL 0 60 0 60 1 00         Results from last 7 days   Lab Units 12/05/19  0513 12/04/19  0452 12/03/19  1140   GLUCOSE RANDOM mg/dL 170* 185* 115             No results found for: BETA-HYDROXYBUTYRATE   Results from last 7 days   Lab Units 12/04/19  0529 12/03/19  1247   PH ART  7 388 7 411   PCO2 ART mm Hg 35 8* 33 7*   PO2 ART mm Hg 101 3 77 3   HCO3 ART mmol/L 21 1* 20 9*   BASE EXC ART mmol/L -3 3 -2 8   O2 CONTENT ART mL/dL 18 9 20 3   O2 HGB, ARTERIAL % 97 1* 94 7   ABG SOURCE  Radial, Left Radial, Left                 Results from last 7 days   Lab Units 12/03/19  1140   TROPONIN I ng/mL <0 02         Results from last 7 days   Lab Units 12/03/19  1140   PROTIME seconds 10 6   INR  0 99   PTT seconds 23*                         Results from last 7 days   Lab Units 12/03/19  1140   NT-PRO BNP pg/mL 101                         Results from last 7 days   Lab Units 12/03/19  1845   CLARITY UA  Clear   COLOR UA  Yellow   SPEC GRAV UA  >=1 030   PH UA  5 0   GLUCOSE UA mg/dl 500 (1/2%)*   KETONES UA mg/dl Negative   BLOOD UA  Negative   PROTEIN UA mg/dl Negative   NITRITE UA  Negative   BILIRUBIN UA  Negative   UROBILINOGEN UA E U /dl 0 2   LEUKOCYTES UA  Negative                                 Results from last 7 days   Lab Units 12/03/19  1140   TOTAL COUNTED  100           ED Treatment:   Medication Administration from 12/03/2019 1111 to 12/03/2019 1415       Date/Time Order Dose Route Action Action by Comments     12/03/2019 1141 sodium chloride 0 9 % infusion 125 mL/hr Intravenous New [address] Cruz Guaman RN      12/03/2019 1139 ipratropium (ATROVENT) 0 02 % inhalation solution 0 5 mg 0 5 mg Nebulization Given Harika Truong RN      12/03/2019 1139 albuterol inhalation solution 5 mg 5 mg Nebulization Given Harika Truong RN      12/03/2019 1142 methylPREDNISolone sodium succinate (Solu-MEDROL) injection 60 mg 60 mg Intravenous Given Harika Truong RN      12/03/2019 1201 albuterol inhalation solution 5 mg 5 mg Nebulization Given Harika Truong RN      12/03/2019 1201 ipratropium (ATROVENT) 0 02 % inhalation solution 0 5 mg 0 5 mg Nebulization Given Harika Truong RN      12/03/2019 1321 azithromycin (ZITHROMAX) 500 mg in sodium chloride 0 9% 250mL IVPB 500 mg 500 mg Intravenous New Bag Harika Truong RN      12/03/2019 1348 albuterol inhalation solution 5 mg 5 mg Nebulization Given Chaparro Lo RN         Past Medical History:   Diagnosis Date    Anesthesia complication     Difficult to wake up    Arthritis     BPH (benign prostatic hyperplasia)     urinary frequency    Cancer (HCC)     basal cell neck, face    COPD (chronic obstructive pulmonary disease) [facility] [address] )     Full dentures     Hiatal hernia     History of methicillin resistant staphylococcus aureus (MRSA)     10/11/2018 MRSA (nares) positive    Hypertension     controlled    Irritable bowel syndrome     Kidney stone     at least 7 episodes    Liver disease     Alpha 1- enzyme deficiency - diagnosed 2002   has been on weekly replacement therapy since then    Pulmonary emphysema ([facility] [address] )     1/25/15  FEV1 - 2 45 liters or 59% of predicted    RSV infection 12/2017    Wears glasses     for driving only     Present on Admission:   AAT (alpha-1-antitrypsin) deficiency ([facility] [address] )   Acute on chronic respiratory failure with hypoxia (HCC)   BPH (benign prostatic hyperplasia)   Centrilobular emphysema (HCC)   Chronic venous insufficiency   Essential hypertension   Gastroesophageal reflux disease   Pulmonary hypertension (HCC)   Transaminitis      Admitting Diagnosis: SOB (shortness of breath) [R06 02]  COPD with acute exacerbation ([facility] [address] ) [J44 1]  Age/Sex: 75 y o  male  Admission Orders:  Scheduled Medications:    Medications:  amLODIPine 10 mg Oral Daily   azithromycin 500 mg Oral Q24H   budesonide 0 5 mg Nebulization BID   busPIRone 5 mg Oral TID   enoxaparin 40 mg Subcutaneous Daily   finasteride 5 mg Oral QAM   formoterol 20 mcg Nebulization BID   gabapentin 300 mg Oral TID   ipratropium-albuterol 3 mL Nebulization Q6H   methylPREDNISolone sodium succinate 40 mg Intravenous Q12H CORINA   pantoprazole 40 mg Oral BID   tamsulosin 0 4 mg Oral Daily   zolpidem 10 mg Oral HS     Continuous IV Infusions:    sodium chloride 75 mL/hr Intravenous Continuous     PRN Meds:    acetaminophen 650 mg Oral Q6H PRN   ipratropium-albuterol 3 mL Nebulization Q4H PRN       IP CONSULT TO PULMONOLOGY  IP CONSULT TO CASE MANAGEMENT    Network Utilization Review Department  [email or web address]  ATTENTION: Please call with any questions or concerns to [phone] and carefully listen to the prompts so that you are directed to the right person  All voicemails are confidential   Gage Macdonald all requests for admission clinical reviews, approved or denied determinations and any other requests to dedicated fax number below belonging to the campus where the patient is receiving treatment   List of dedicated fax numbers for the Facilities:  FACILITY NAME UR FAX NUMBER   ADMISSION DENIALS (Administrative/Medical Necessity) [phone]   [address] (Maternity/NICU/Pediatrics) [phone]   [facility] [phone]   Horace Casas [phone]   Wen Miranda [phone]   Ivett Rees [facility] [address] [facility] [phone]   [facility] [address] Rik Gómez [address] [place] [address] And [facility] [phone]     [address] [facility] [phone]

## 2019-12-05 NOTE — PROGRESS NOTES
The azithromycin has / have been converted to Oral per [facility] IV-to-PO Auto-Conversion Protocol for Adults as approved by the Pharmacy and Therapeutics Committee  The patient met all eligible criteria:  3 Age = 25years old   2) Received at least one dose of the IV form   3) Receiving at least one other scheduled oral/enteral medication   4) Tolerating an oral/enteral diet   and did not have any exclusions:   1) Critical care patient   2) Active GI bleed (IF assessing H2RAs or PPIs)   3) Continuous tube feeding (IF assessing cipro, doxycycline, levofloxacin, minocycline, rifampin, or voriconazole)   4) Receiving PO vancomycin (IF assessing metronidazole)   5) Persistent nausea and/or vomiting   6) Ileus or gastrointestinal obstruction   7) Dannie/nasogastric tube set for continuous suction   8) Specific order not to automatically convert to PO (in the order's comments or if discussed in the most recent Infectious Disease or primary team's progress notes)

## 2019-12-05 NOTE — OCCUPATIONAL THERAPY NOTE
OT Evaluation       12/05/19 0955   Note Type   Note type Eval only   Restrictions/Precautions   Other Precautions O2;Fall Risk   Pain Assessment   Pain Assessment No/denies pain   Pain Score No Pain   Home Living   Type of Home House  (2 TEMI)   Home Layout One level   Bathroom Shower/Tub Walk-in shower   Bathroom Toilet Standard   Bathroom Equipment Built-in shower seat  (Does not use)   Home Equipment Cane  (Home O2, 3 L 24/7)   Prior Function   Level of [place] Independent with ADLs and functional mobility  (Uses cane for community mobility, furniture surfs inside)   Lives With Alone   Receives Help From Family   ADL Assistance Independent   IADLs Needs assistance   Comments Patient drives, has assist for cleaning, daughter gets groceries   ADL   Eating Assistance 7  Independent   Grooming Assistance 5  Supervision/Setup   UB Bathing Assistance 4  Minimal Assistance   LB Bathing Assistance 4  Minimal Assistance   UB Dressing Assistance 4  Minimal Assistance   LB Dressing Assistance 4  Minimal Assistance   Toileting Assistance  5  Supervision/Setup   Bed Mobility   Supine to Sit 7  Independent   Transfers   Sit to Stand 4  Minimal assistance   Additional items Assist x 1;Verbal cues  (Height of bed raised)   Stand to Sit 4  Minimal assistance   Additional items Assist x 1   Functional Mobility   Functional Mobility 4  Minimal assistance   Additional Comments Ambulated several steps with hand held assist; limited by increased work of breathing, patient ambulated to bathroom prior to OT entry into room and with significant SOB and increased work of breathing, SpO2 96% on 3L however patient visibly fatigued and SOB   Balance   Static Sitting Fair +   Dynamic Sitting Fair   Static Standing Poor +   Dynamic Standing Poor +   Activity Tolerance   Activity Tolerance Patient limited by fatigue  (Limited by SOB)   RUE Assessment   RUE Assessment WFL  (Strength grossly 4-/5)   LUE Assessment   LUE Assessment WFL  (Strength grossly 4-/5)   Cognition   Overall Cognitive Status WFL   Arousal/Participation Alert; Cooperative   Orientation Level Oriented X4   Following Commands Follows all commands and directions without difficulty   Assessment   Limitation Decreased ADL status; Decreased UE strength;Decreased Safe judgement during ADL;Decreased endurance;Decreased self-care trans;Decreased high-level ADLs   Prognosis Good   Assessment Patient evaluated by Occupational Therapy  Patient admitted with Acute on chronic respiratory failure with hypoxia ([facility] [address] )  The patients occupational profile, medical and therapy history includes a extensive additional review of physical, cognitive, or psychosocial history related to current functional performance  Comorbidities affecting functional mobility and ADLS include: AAT deficiency, GERD, BPH, centrilobular emphysema, pul HTN, CRF w hypoxia  Prior to admission, patient was independent with functional mobility with cane and independent with ADLS  The evaluation identifies the following performance deficits: weakness, impaired balance, decreased endurance, decreased coordination, increased fall risk, new onset of impairment of functional mobility, decreased ADLS, pain, decreased activity tolerance, SOB upon exertion and decreased strength, that result in activity limitations and/or participation restrictions  This evaluation requires clinical decision making of high complexity, because the patient presents with comorbidites that affect occupational performance and required significant modification of tasks or assistance with consideration of multiple treatment options  The Barthel Index was used as a functional outcome tool presenting with a score of 50, indicating marked limitations of functional mobility and ADLS  Patient will benefit from skilled Occupational Therapy services to address above deficits and facilitate a safe return to prior level of function     Goals   Patient Goals To decrease SOB   STG Time Frame   (1-7 days)   Short Term Goal  Goals established to promote patient goal of decreasing SOB:  Patient will increase standing tolerance to 5 minutes during ADL task to decrease assistance level and decrease fall risk; Patient will increase functional mobility to and from bathroom with rolling walker with supervision to increase performance with ADLS and to use a toilet; Patient will tolerate 5 minutes of UE ROM/strengthening to increase general activity tolerance and performance in ADLS/IADLS; Patient will improve functional activity tolerance to 5 minutes of sustained functional tasks to increase participation in basic self-care and decrease assistance level;  Patient will be able to to verbalize understanding and perform energy conservation/proper body mechanics during ADLS and functional mobility at least 75% of the time with minimal cueing to decrease signs of fatigue and increase stamina to return to prior level of function; Patient will increase dynamic sitting balance to fair+ to improve the ability to sit at edge of bed or on a chair for ADLS;  Patient will increase static/dynamic standing balance to fair- to improve postural stability and decrease fall risk during standing ADLS and transfers       LTG Time Frame   (8-14 days)   Long Term Goal Patient will increase standing tolerance to 10 minutes during ADL task to decrease assistance level and decrease fall risk; Patient will increase functional mobility to and from bathroom with rolling walker independently to increase performance with ADLS and to use a toilet; Patient will tolerate 10 minutes of UE ROM/strengthening to increase general activity tolerance and performance in ADLS/IADLS; Patient will improve functional activity tolerance to 10 minutes of sustained functional tasks to increase participation in basic self-care and decrease assistance level;  Patient will be able to to verbalize understanding and perform energy conservation/proper body mechanics during ADLS and functional mobility at least 90% of the time with nocueing to decrease signs of fatigue and increase stamina to return to prior level of function; Patient will increase static/dynamic sitting balance to good to improve the ability to sit at edge of bed or on a chair for ADLS;  Patient will increase static/dynamic standing balance to fair to improve postural stability and decrease fall risk during standing ADLS and transfers  Functional Transfer Goals   Pt Will Perform All Functional Transfers   (STG supervision, LTG independent)   ADL Goals   Pt Will Perform Grooming   (LTG independent)   Pt Will Perform Bathing   (STG supervision, LTG independent)   Pt Will Perform UE Dressing   (LTG independent)   Pt Will Perform LE Dressing   (STG supervision, LTG independent)   Pt Will Perform Toileting   (LTG independent)   Plan   Treatment Interventions ADL retraining;Functional transfer training;UE strengthening/ROM; Endurance training;Patient/family training;Equipment evaluation/education; Compensatory technique education;Continued evaluation; Energy conservation   Goal Expiration Date 12/19/19   OT Frequency 3-5x/wk   Recommendation   OT Discharge Recommendation Short Term Rehab   Barthel Index   Feeding 10   Bathing 0   Grooming Score 0   Dressing Score 5   Bladder Score 10   Bowels Score 10   Toilet Use Score 5   Transfers (Bed/Chair) Score 10   Mobility (Level Surface) Score 0   Stairs Score 0   Barthel Index Score 48   Licensure   [place] License Number  CAIT Shaikh/DURGA [id number]

## 2019-12-05 NOTE — PROGRESS NOTES
[facility] Practice Progress Note - Rocklin Cushing 75 y o  male MRN: [id number]    Unit/Bed#: [address] [place] [address] Encounter: [id number]      Assessment/Plan:  * Acute on chronic respiratory failure with hypoxia (HCC)  Assessment & Plan  Stable on home 3L NC O2, titrate oxygen as needed  ABG:  PH 7 41, pCO2 33 7, PO2 77 3, H CO3 20 9  Likely secondary to worsening lung function  CT chest:Stable lower lobe predominant severe pulmonary emphysema and hepatic consistent with patient's history of alpha-1 antitrypsin deficiency  Chest x-ray:  No acute cardiopulmonary disease  Solumedrol 60 q 8h IV, plan to taper clinically  Budesonide nebs b i d  Performist nebs b i d   DuoNeb q 6h rest and q 4h p r n    Continue azithromycin 500 milligram IV daily, IV NS 75 cc/hour  -repeat ABG 12/4:  7 38/36/101/21  -await pulmonary recommendations  - CTA performed 12/4 showed irregular 9 mm left lower lobe nodule demonstrating slow enlargement over time and should be viewed highly suspicious for bronchogenic carcinoma      Transaminitis  Assessment & Plan  AST 49,  present on admission  Likely secondary to alpha-1 antitrypsin deficiency  Continue to monitor daily CMP    Centrilobular emphysema (HCC)  Assessment & Plan  Continue with budesonide and Perforomist nebs    BPH (benign prostatic hyperplasia)  Assessment & Plan  Stable continue with tamsulosin and finasteride    Essential hypertension  Assessment & Plan  Stable continue with amlodipine 10 milligrams daily  Monitor frequent vital signs    Gastroesophageal reflux disease  Assessment & Plan  Continue with pantoprazole and Zantac    AAT (alpha-1-antitrypsin) deficiency ([facility] [address] )  Assessment & Plan  Follows with Pulmonary  Continue with weekly Zemaira injections, budesonide, and performist nebs  -pulmonary consult appreciate recommendations      Subjective:   Patient underwent a CT of the chest yesterday which showed irregular and in the left lower lobe nodule demonstrating slight enlargement over time and she has suspicious for bronchogenic carcinoma  Patient's morning electrolytes show sodium 141 potassium 4 1  White blood cell count is 10 39 down from 11 15 yesterday  Patient this morning complains of some chest tightness which she says he has consistently on and off, his shortness of breath is baseline with no worsening or improvement  Says he has been constipated, and denies dysuria, hematuria, or problems with urination  "This note has been constructed using a voice recognition system  Therefore there may be syntax, spelling, and/or grammatical errors  Please call if you have any questions  "    Objective:     Vitals: Blood pressure (!) 180/92, pulse 87, temperature 97 7 °F (36 5 °C), temperature source Oral, resp  rate 22, height 6' 5" (1 956 m), weight 90 3 kg (199 lb), SpO2 98 %  ,Body mass index is 23 6 kg/m²  Wt Readings from Last 3 Encounters:   12/03/19 90 3 kg (199 lb)   12/03/19 90 7 kg (199 lb 14 4 oz)   11/27/19 90 7 kg (200 lb)       Intake/Output Summary (Last 24 hours) at 12/5/2019 0918  Last data filed at 12/5/2019 0601  Gross per 24 hour   Intake 1801 25 ml   Output 1125 ml   Net 676 25 ml       Physical Exam:   General:  AAO x3, in no acute distress  Patient was examined right after coming out from the bathroom  Cardiac:  RRR, no murmur patient on auscultation  Pulmonary:  Decreased breath sounds in all lung fields  No wheezes rales or rhonchi appreciated  Currently on 3 L oxygen by nasal cannula  Abdomen:  Distended, positive bowel sounds, no tenderness to palpation  Extremities:  Distal pulses intact      Recent Results (from the past 24 hour(s))   CBC and differential    Collection Time: 12/05/19  5:13 AM   Result Value Ref Range    WBC 10 39 (H) 4 31 - 10 16 Thousand/uL    RBC 4 23 3 88 - 5 62 Million/uL    Hemoglobin 13 7 12 0 - 17 0 g/dL    Hematocrit 40 9 36 5 - 49 3 %    MCV 97 82 - 98 fL    MCH 32 4 26 8 - 34 3 pg    MCHC 33 5 31 4 - 37 4 g/dL    RDW 13 3 11 6 - 15 1 %    MPV 9 5 8 9 - 12 7 fL    Platelets 041 (L) 024 - 390 Thousands/uL    nRBC 0 /100 WBCs    Neutrophils Relative 89 (H) 43 - 75 %    Immat GRANS % 2 0 - 2 %    Lymphocytes Relative 6 (L) 14 - 44 %    Monocytes Relative 3 (L) 4 - 12 %    Eosinophils Relative 0 0 - 6 %    Basophils Relative 0 0 - 1 %    Neutrophils Absolute 9 29 (H) 1 85 - 7 62 Thousands/µL    Immature Grans Absolute 0 19 0 00 - 0 20 Thousand/uL    Lymphocytes Absolute 0 58 (L) 0 60 - 4 47 Thousands/µL    Monocytes Absolute 0 32 0 17 - 1 22 Thousand/µL    Eosinophils Absolute 0 00 0 00 - 0 61 Thousand/µL    Basophils Absolute 0 01 0 00 - 0 10 Thousands/µL   Comprehensive metabolic panel    Collection Time: 12/05/19  5:13 AM   Result Value Ref Range    Sodium 141 136 - 145 mmol/L    Potassium 4 1 3 5 - 5 3 mmol/L    Chloride 104 100 - 108 mmol/L    CO2 26 21 - 32 mmol/L    ANION GAP 11 4 - 13 mmol/L    BUN 19 5 - 25 mg/dL    Creatinine 1 05 0 60 - 1 30 mg/dL    Glucose 170 (H) 65 - 140 mg/dL    Calcium 8 5 8 3 - 10 1 mg/dL    AST 52 (H) 5 - 45 U/L     (H) 12 - 78 U/L    Alkaline Phosphatase 62 46 - 116 U/L    Total Protein 6 2 (L) 6 4 - 8 2 g/dL    Albumin 3 3 (L) 3 5 - 5 0 g/dL    Total Bilirubin 0 60 0 20 - 1 00 mg/dL    eGFR 69 ml/min/1 73sq m   Phosphorus    Collection Time: 12/05/19  5:13 AM   Result Value Ref Range    Phosphorus 3 0 2 3 - 4 1 mg/dL   Magnesium    Collection Time: 12/05/19  5:13 AM   Result Value Ref Range    Magnesium 2 0 1 6 - 2 6 mg/dL       Current Facility-Administered Medications   Medication Dose Route Frequency Provider Last Rate Last Dose    acetaminophen (TYLENOL) tablet 650 mg  650 mg Oral Q6H PRN Shannan Barney DO        amLODIPine (NORVASC) tablet 10 mg  10 mg Oral Daily Javan Duffy MD   10 mg at 12/05/19 0913    azithromycin (ZITHROMAX) tablet 500 mg  500 mg Oral Q24H Javan Duffy MD        budesonide (PULMICORT) inhalation solution 0 5 mg  0 5 mg Nebulization BID Javan Duffy MD   0 5 mg at 12/05/19 0738    busPIRone (BUSPAR) tablet 5 mg  5 mg Oral TID Lisa Leger PA-C   5 mg at 12/05/19 0913    enoxaparin (LOVENOX) subcutaneous injection 40 mg  40 mg Subcutaneous Daily Claudette Sabine, MD   40 mg at 12/05/19 0912    finasteride (PROSCAR) tablet 5 mg  5 mg Oral QAM Claudette Sabine, MD   5 mg at 12/05/19 0912    formoterol (PERFOROMIST) nebulizer solution 20 mcg  20 mcg Nebulization BID Claudette Sabine, MD   20 mcg at 12/05/19 0802    gabapentin (NEURONTIN) capsule 300 mg  300 mg Oral TID Claudette Sabine, MD   300 mg at 12/05/19 0912    ipratropium-albuterol (DUO-NEB) 0 5-2 5 mg/3 mL inhalation solution 3 mL  3 mL Nebulization Q6H Claudette Sabine, MD   3 mL at 12/05/19 0738    ipratropium-albuterol (DUO-NEB) 0 5-2 5 mg/3 mL inhalation solution 3 mL  3 mL Nebulization Q4H PRN Claudette Sabine, MD        methylPREDNISolone sodium succinate (Solu-MEDROL) injection 40 mg  40 mg Intravenous Q12H [facility] Jose Long DO   40 mg at 12/05/19 0912    pantoprazole (PROTONIX) EC tablet 40 mg  40 mg Oral BID Claudette Sabine, MD   40 mg at 12/05/19 0913    sodium chloride 0 9 % infusion  75 mL/hr Intravenous Continuous Claudette Sabine, MD 75 mL/hr at 12/05/19 0914 75 mL/hr at 12/05/19 0914    tamsulosin (FLOMAX) capsule 0 4 mg  0 4 mg Oral Daily Claudette Sabine, MD   0 4 mg at 12/05/19 0913    zolpidem (AMBIEN) tablet 10 mg  10 mg Oral HS Claudette Sabine, MD   10 mg at 12/04/19 2229       Invasive Devices     Peripheral Intravenous Line            Peripheral IV 12/03/19 Left Antecubital 1 day                Lab, Imaging and other studies: I have personally reviewed pertinent reports      VTE Pharmacologic Prophylaxis: Enoxaparin (Lovenox)  VTE Mechanical Prophylaxis: sequential compression device    Juana Smith MD

## 2019-12-05 NOTE — PROGRESS NOTES
Progress Note - Pulmonary   Vena Braden 75 y o  male MRN: [id number]  Unit/Bed#: [address] [place] [address] Encounter: [id number]      Assessment/Plan:        Severe bullous emphysematous COPD:  -FEV1 44%  -chronically on performance, Pulmicort,  -uses duo nebs at home  -has attended pulmonary rehab in the past and has a few lifetime days left available  -will need pulmonary rehab in the outpatient setting     Alpha-1 antitrypsin deficiency:  -is on weekly Zemaira injections  -patient will need to reschedule for next Wednesday     Chronic hypoxemic respiratory failure:  Currently on 4 L nasal cannula  Baseline for this patient  He does not qualify for ventilator therapy     Chronic dyspnea:  -I discussed with Barbra Sanchez of Cardiology, they do not feel this is cardiac in nature  -recent echos have been normal  -recent new med stress test were with fixed non reproducible defect  -no suggestions of reversible ischemia  -barring all else likely due to poor reserve  -patient can follow up in the outpatient setting cardiology  -currently remains on 40 mg q 12 steroids  -can taper down tomorrow given patient does not feel significant improvement on them and is not displaying signs of wheezing    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:  -initiated patient on BuSpar 5 mg t i d  Yesterday  -seems to be tolerating well  -does not appear as dyspneic today  -can up titrate to 7 5 mg t i d  Today  -patient had poorly tolerant episodes of benzodiazepines in the past, reports passing out waking up the next day on doses of antianxiety medicine  -pursuant of nonsedating medication     History of Remote PE  -not currently on anticoagulation therapy  -completed 6 months A/C therapy  -no acute PE    Left lung nodule:  -this is new  -patient can undergo outpatient PET-CT to see if characteristic findings of glucose avid lesions  -given his high risk state would need to discuss further options at that time pending results   -discussed this with the patient and he is agreeable to such              ______________________________________________________________________    Subjective: Pt seen and examined at bedside  No new complaints  Breathing better today  Tele Events:     Vitals:   Temp:  [97 7 °F (36 5 °C)-98 9 °F (37 2 °C)] 97 7 °F (36 5 °C)  HR:  [76-87] 87  Resp:  [18-22] 22  BP: (144-180)/(66-92) 150/73  Weight (last 2 days)     Date/Time   Weight    12/03/19 1115   90 3 (199)            Oxygen Therapy  SpO2: 98 %  O2 Flow Rate (L/min): 3 L/min    IV Infusions:    sodium chloride 75 mL/hr Last Rate: 75 mL/hr (12/05/19 0914)       Nutrition:        Diet Orders   (From admission, onward)             Start     Ordered    12/03/19 1542  Diet Regular; Regular House  Diet effective now     Question Answer Comment   Diet Type Regular    Regular Regular House    RD to adjust diet per protocol? Yes        12/03/19 1541    12/03/19 1440  Room Service  Once     Question:  Type of Service  Answer:  Room Service-Appropriate    12/03/19 1440                Ins/Outs:   I/O       12/03 0701 - 12/04 0700 12/04 0701 - 12/05 0700 12/05 0701 - 12/06 0700    P  O  240      I V  (mL/kg) 1986 3 (22) 1801 3 (19 9)     Total Intake(mL/kg) 2226 3 (24 7) 1801 3 (19 9)     Urine (mL/kg/hr) 770 1125 (0 5)     Total Output 770 1125     Net +1456 3 +676 3                  Lines/Drains:  Invasive Devices     Peripheral Intravenous Line            Peripheral IV 12/03/19 Left Antecubital 1 day                 Active medications:  Scheduled Meds:  Current Facility-Administered Medications:  acetaminophen 650 mg Oral Q6H PRN Uday Ybarra DO    amLODIPine 10 mg Oral Daily Lucas Griffin MD    azithromycin 500 mg Oral Q24H Lucas Griffin MD    budesonide 0 5 mg Nebulization BID Lucas Griffin MD    busPIRone 5 mg Oral TID Anders Moran PA-C    enoxaparin 40 mg Subcutaneous Daily Lucas Griffin MD    finasteride 5 mg Oral QAM Lucas Griffin MD    formoterol 20 mcg Nebulization BID Leopoldo England MD    gabapentin 300 mg Oral TID Leopoldo England MD    ipratropium-albuterol 3 mL Nebulization Q6H Leopoldo England MD    ipratropium-albuterol 3 mL Nebulization Q4H PRN Leopoldo England MD    methylPREDNISolone sodium succinate 40 mg Intravenous Q12H [facility] Jose Long DO    pantoprazole 40 mg Oral BID Leopoldo England MD    sodium chloride 75 mL/hr Intravenous Continuous Leopoldo England MD Last Rate: 75 mL/hr (12/05/19 0914)   tamsulosin 0 4 mg Oral Daily Leopoldo England MD    zolpidem 10 mg Oral HS Leopoldo England MD      PRN Meds:  acetaminophen 650 mg Q6H PRN   ipratropium-albuterol 3 mL Q4H PRN     ____________________________________________________________________      Physical Exam   Constitutional: He is oriented to person, place, and time  He appears well-developed  No distress  HENT:   Head: Normocephalic and atraumatic  Eyes: Pupils are equal, round, and reactive to light  No scleral icterus  Neck: Normal range of motion  No tracheal deviation present  Cardiovascular: Normal rate, regular rhythm, normal heart sounds and intact distal pulses  Exam reveals no gallop and no friction rub  No murmur heard  Pulmonary/Chest: No accessory muscle usage or stridor  No tachypnea  No respiratory distress  He has decreased breath sounds in the right upper field, the right middle field, the right lower field, the left upper field, the left middle field and the left lower field  He has no wheezes  He has no rhonchi  He has no rales  He exhibits no tenderness  Mild to moderately decreased breath sounds baseline    No active wheezing    Currently on 3 L nasal cannula    Appears to not be as dyspneic today    Appears comfortably conversant   Abdominal: Soft  He exhibits no distension  There is no tenderness  There is no rebound and no guarding  Musculoskeletal: Normal range of motion  He exhibits no edema  Neurological: He is alert and oriented to person, place, and time  Skin: Skin is warm and dry  Psychiatric: He has a normal mood and affect            ____________________________________________________________________    Labs:   CBC: Results from last 7 days   Lab Units 12/05/19  0513 12/04/19  0452 12/03/19  1140   WBC Thousand/uL 10 39* 11 15* 10 32*   HEMOGLOBIN g/dL 13 7 13 3 15 0   HEMATOCRIT % 40 9 38 6 43 3   MCV fL 97 95 95   PLATELETS Thousands/uL 126* 104* 127*     CMP: Results from last 7 days   Lab Units 12/05/19  0513 12/04/19  0452 12/03/19  1140   POTASSIUM mmol/L 4 1 4 2 4 1   CHLORIDE mmol/L 104 103 103   CO2 mmol/L 26 23 26   BUN mg/dL 19 20 21   CREATININE mg/dL 1 05 1 12 1 09   CALCIUM mg/dL 8 5 8 3 8 8   AST U/L 52* 39 49*   ALT U/L 150* 142* 170*   ALK PHOS U/L 62 60 68   EGFR ml/min/1 73sq m 69 64 66     No components found for: ABG    Magnesium:   Results from last 7 days   Lab Units 12/05/19  0513   MAGNESIUM mg/dL 2 0     Phosphorous:   Results from last 7 days   Lab Units 12/05/19  0513   PHOSPHORUS mg/dL 3 0     Troponin:   Results from last 7 days   Lab Units 12/03/19  1140   TROPONIN I ng/mL <0 02     PT/INR:   Results from last 7 days   Lab Units 12/03/19  1140   PTT seconds 23*   INR  0 99     Lactic Acid:     BNP:   Results from last 7 days   Lab Units 12/03/19  1140   NT-PRO BNP pg/mL 101     TSH:     Procalcitonin: Invalid input(s): PROCALCITONIN      Imaging:       CTA chest pe study   Final Result by Leta Mercado DO (12/04 1724)      1  Irregular 9 mm left lower lobe nodule demonstrating slow enlargement over time should be viewed highly suspicious for bronchogenic carcinoma  Cardiothoracic surgical consultation is advised  2   No acute pulmonary emboli  No change in chronic left lower lobe pulmonary embolus  3  Tiny left pleural effusion  4  Fatty liver        I personally discussed this study with Dr Abiola Lou covering for this patient on 12/4/2019 at 5:18 PM                       Workstation performed: [id number] CT chest without contrast   Final Result by Lolly Pulido MD (12/03 8927)      Stable lower lobe predominant severe pulmonary emphysema in a pattern consistent with this patient's history of alpha-1 antitrypsin deficiency  No acute findings  Workstation performed: [id number]         XR chest 1 view portable   Final Result by Osiel Ennis MD (12/03 1714)      No acute cardiopulmonary disease  Workstation performed: [id number]                  Micro: Lab Results   Component Value Date    BLOODCX No Growth After 5 Days  12/29/2017    BLOODCX No Growth After 5 Days   12/29/2017    WOUNDCULT 3+ Growth of Staphylococcus aureus (A) 06/12/2019    MRSACULTURE  11/11/2019     No Methicillin Resistant Staphlyococcus aureus (MRSA) isolated            Invalid input(s): LEGIONELLAURINARYANTIGEN        Code Status: Level 1 - Full Code

## 2019-12-05 NOTE — PLAN OF CARE
Problem: Potential for Falls  Goal: Patient will remain free of falls  Description  INTERVENTIONS:  - Assess patient frequently for physical needs  -  Identify cognitive and physical deficits and behaviors that affect risk of falls  -  Bennett fall precautions as indicated by assessment   - Educate patient/family on patient safety including physical limitations  - Instruct patient to call for assistance with activity based on assessment  - Modify environment to reduce risk of injury  - Consider OT/PT consult to assist with strengthening/mobility  Outcome: Progressing  Note:   Patient free of falls  Not always compliant with calling for assistance before going to the bathroom  Fall precautions observed  Problem: RESPIRATORY - ADULT  Goal: Achieves optimal ventilation and oxygenation  Description  INTERVENTIONS:  - Assess for changes in respiratory status  - Assess for changes in mentation and behavior  - Position to facilitate oxygenation and minimize respiratory effort  - Oxygen administered by appropriate delivery if ordered  - Encourage broncho-pulmonary hygiene including cough, deep breathe, Incentive Spirometry  - Assess and instruct to report SOB or any respiratory difficulty  - Respiratory Therapy support as indicated   Outcome: Progressing  Note:   Continues with neb treatments and antibiotics  Steroid doses tapered

## 2019-12-06 ENCOUNTER — TELEPHONE (OUTPATIENT)
Dept: GASTROENTEROLOGY | Facility: AMBULARY SURGERY CENTER | Age: 74
End: 2019-12-06

## 2019-12-06 PROBLEM — R91.8 LUNG MASS: Status: ACTIVE | Noted: 2018-02-14

## 2019-12-06 PROBLEM — J96.20 ACUTE ON CHRONIC RESPIRATORY FAILURE (HCC): Status: ACTIVE | Noted: 2017-12-30

## 2019-12-06 LAB
ANION GAP SERPL CALCULATED.3IONS-SCNC: 10 MMOL/L (ref 4–13)
BUN SERPL-MCNC: 20 MG/DL (ref 5–25)
CALCIUM SERPL-MCNC: 8.4 MG/DL (ref 8.3–10.1)
CHLORIDE SERPL-SCNC: 101 MMOL/L (ref 100–108)
CO2 SERPL-SCNC: 28 MMOL/L (ref 21–32)
CREAT SERPL-MCNC: 0.99 MG/DL (ref 0.6–1.3)
ERYTHROCYTE [DISTWIDTH] IN BLOOD BY AUTOMATED COUNT: 13.4 % (ref 11.6–15.1)
GFR SERPL CREATININE-BSD FRML MDRD: 74 ML/MIN/1.73SQ M
GLUCOSE SERPL-MCNC: 151 MG/DL (ref 65–140)
HCT VFR BLD AUTO: 38.4 % (ref 36.5–49.3)
HGB BLD-MCNC: 13 G/DL (ref 12–17)
MAGNESIUM SERPL-MCNC: 2 MG/DL (ref 1.6–2.6)
MCH RBC QN AUTO: 32.7 PG (ref 26.8–34.3)
MCHC RBC AUTO-ENTMCNC: 33.9 G/DL (ref 31.4–37.4)
MCV RBC AUTO: 97 FL (ref 82–98)
NRBC BLD AUTO-RTO: 0 /100 WBCS
PHOSPHATE SERPL-MCNC: 3.8 MG/DL (ref 2.3–4.1)
PLATELET # BLD AUTO: 127 THOUSANDS/UL (ref 149–390)
PMV BLD AUTO: 9.2 FL (ref 8.9–12.7)
POTASSIUM SERPL-SCNC: 4.4 MMOL/L (ref 3.5–5.3)
RBC # BLD AUTO: 3.98 MILLION/UL (ref 3.88–5.62)
SODIUM SERPL-SCNC: 139 MMOL/L (ref 136–145)
WBC # BLD AUTO: 10.33 THOUSAND/UL (ref 4.31–10.16)

## 2019-12-06 PROCEDURE — 83735 ASSAY OF MAGNESIUM: CPT | Performed by: STUDENT IN AN ORGANIZED HEALTH CARE EDUCATION/TRAINING PROGRAM

## 2019-12-06 PROCEDURE — 97110 THERAPEUTIC EXERCISES: CPT

## 2019-12-06 PROCEDURE — 84100 ASSAY OF PHOSPHORUS: CPT | Performed by: STUDENT IN AN ORGANIZED HEALTH CARE EDUCATION/TRAINING PROGRAM

## 2019-12-06 PROCEDURE — 94640 AIRWAY INHALATION TREATMENT: CPT

## 2019-12-06 PROCEDURE — 94760 N-INVAS EAR/PLS OXIMETRY 1: CPT

## 2019-12-06 PROCEDURE — 99233 SBSQ HOSP IP/OBS HIGH 50: CPT | Performed by: INTERNAL MEDICINE

## 2019-12-06 PROCEDURE — 80048 BASIC METABOLIC PNL TOTAL CA: CPT | Performed by: STUDENT IN AN ORGANIZED HEALTH CARE EDUCATION/TRAINING PROGRAM

## 2019-12-06 PROCEDURE — 85025 COMPLETE CBC W/AUTO DIFF WBC: CPT | Performed by: STUDENT IN AN ORGANIZED HEALTH CARE EDUCATION/TRAINING PROGRAM

## 2019-12-06 PROCEDURE — 94150 VITAL CAPACITY TEST: CPT

## 2019-12-06 PROCEDURE — ND001 PR NO DOCUMENTATION: Performed by: FAMILY MEDICINE

## 2019-12-06 RX ORDER — BUSPIRONE HYDROCHLORIDE 10 MG/1
10 TABLET ORAL 3 TIMES DAILY
Status: DISCONTINUED | OUTPATIENT
Start: 2019-12-06 | End: 2019-12-09 | Stop reason: HOSPADM

## 2019-12-06 RX ORDER — METHYLPREDNISOLONE SODIUM SUCCINATE 40 MG/ML
40 INJECTION, POWDER, LYOPHILIZED, FOR SOLUTION INTRAMUSCULAR; INTRAVENOUS DAILY
Status: DISCONTINUED | OUTPATIENT
Start: 2019-12-07 | End: 2019-12-09 | Stop reason: HOSPADM

## 2019-12-06 RX ADMIN — TAMSULOSIN HYDROCHLORIDE 0.4 MG: 0.4 CAPSULE ORAL at 09:41

## 2019-12-06 RX ADMIN — GABAPENTIN 300 MG: 300 CAPSULE ORAL at 16:13

## 2019-12-06 RX ADMIN — AZITHROMYCIN MONOHYDRATE 500 MG: 250 TABLET ORAL at 13:31

## 2019-12-06 RX ADMIN — BUDESONIDE 0.5 MG: 0.5 INHALANT RESPIRATORY (INHALATION) at 07:55

## 2019-12-06 RX ADMIN — BUSPIRONE HYDROCHLORIDE 7.5 MG: 5 TABLET ORAL at 09:41

## 2019-12-06 RX ADMIN — AMLODIPINE BESYLATE 10 MG: 10 TABLET ORAL at 09:41

## 2019-12-06 RX ADMIN — GABAPENTIN 300 MG: 300 CAPSULE ORAL at 09:41

## 2019-12-06 RX ADMIN — IPRATROPIUM BROMIDE AND ALBUTEROL SULFATE 3 ML: 2.5; .5 SOLUTION RESPIRATORY (INHALATION) at 02:41

## 2019-12-06 RX ADMIN — BUSPIRONE HYDROCHLORIDE 10 MG: 10 TABLET ORAL at 21:18

## 2019-12-06 RX ADMIN — ENOXAPARIN SODIUM 40 MG: 40 INJECTION SUBCUTANEOUS at 09:42

## 2019-12-06 RX ADMIN — BUDESONIDE 0.5 MG: 0.5 INHALANT RESPIRATORY (INHALATION) at 22:06

## 2019-12-06 RX ADMIN — PANTOPRAZOLE SODIUM 40 MG: 40 TABLET, DELAYED RELEASE ORAL at 09:41

## 2019-12-06 RX ADMIN — BUSPIRONE HYDROCHLORIDE 10 MG: 10 TABLET ORAL at 16:13

## 2019-12-06 RX ADMIN — IPRATROPIUM BROMIDE AND ALBUTEROL SULFATE 3 ML: 2.5; .5 SOLUTION RESPIRATORY (INHALATION) at 07:55

## 2019-12-06 RX ADMIN — FORMOTEROL FUMARATE DIHYDRATE 20 MCG: 20 SOLUTION RESPIRATORY (INHALATION) at 22:28

## 2019-12-06 RX ADMIN — GABAPENTIN 300 MG: 300 CAPSULE ORAL at 21:18

## 2019-12-06 RX ADMIN — METHYLPREDNISOLONE SODIUM SUCCINATE 40 MG: 40 INJECTION, POWDER, FOR SOLUTION INTRAMUSCULAR; INTRAVENOUS at 09:40

## 2019-12-06 RX ADMIN — PANTOPRAZOLE SODIUM 40 MG: 40 TABLET, DELAYED RELEASE ORAL at 17:05

## 2019-12-06 RX ADMIN — ZOLPIDEM TARTRATE 10 MG: 5 TABLET, COATED ORAL at 21:20

## 2019-12-06 RX ADMIN — IPRATROPIUM BROMIDE AND ALBUTEROL SULFATE 3 ML: 2.5; .5 SOLUTION RESPIRATORY (INHALATION) at 13:30

## 2019-12-06 RX ADMIN — FINASTERIDE 5 MG: 5 TABLET, FILM COATED ORAL at 09:41

## 2019-12-06 RX ADMIN — FORMOTEROL FUMARATE DIHYDRATE 20 MCG: 20 SOLUTION RESPIRATORY (INHALATION) at 08:18

## 2019-12-06 RX ADMIN — IPRATROPIUM BROMIDE AND ALBUTEROL SULFATE 3 ML: 2.5; .5 SOLUTION RESPIRATORY (INHALATION) at 22:05

## 2019-12-06 NOTE — PLAN OF CARE
Problem: PHYSICAL THERAPY ADULT  Goal: Performs mobility at highest level of function for planned discharge setting  See evaluation for individualized goals  Outcome: Progressing  Note:   Prognosis: Good  Problem List: Decreased strength, Decreased range of motion, Decreased endurance, Impaired balance, Decreased mobility, Decreased coordination, Decreased safety awareness  Assessment: Pt is making progress with endurance and functional mobility  Pt will cont to benefit from skilled PT services  Recommendation: Short-term skilled PT          See flowsheet documentation for full assessment

## 2019-12-06 NOTE — PLAN OF CARE
Problem: Potential for Falls  Goal: Patient will remain free of falls  Description  INTERVENTIONS:  - Assess patient frequently for physical needs  -  Identify cognitive and physical deficits and behaviors that affect risk of falls    -  Chambers fall precautions as indicated by assessment   - Educate patient/family on patient safety including physical limitations  - Instruct patient to call for assistance with activity based on assessment  - Modify environment to reduce risk of injury  - Consider OT/PT consult to assist with strengthening/mobility  Outcome: Progressing   Stand by assist/ cane/ call bell  Problem: RESPIRATORY - ADULT  Goal: Achieves optimal ventilation and oxygenation  Description  INTERVENTIONS:  - Assess for changes in respiratory status  - Assess for changes in mentation and behavior  - Position to facilitate oxygenation and minimize respiratory effort  - Oxygen administered by appropriate delivery if ordered  - Encourage broncho-pulmonary hygiene including cough, deep breathe, Incentive Spirometry  - Assess and instruct to report SOB or any respiratory difficulty  - Respiratory Therapy support as indicated   Outcome: Progressing   Oxygen, monitor vitals, monitor respiratory, administer medication

## 2019-12-06 NOTE — SOCIAL WORK
Day 2, Pt is not a MB  Pts daughter Suly Hubbard came to speak with CM about pts discharge plans/options and informed CM she discussed choices with her dad and their first choice is [facility] then OO  CM met with pt to f/u on STR choices and discuss same  Pt confirmed above and was working with PT in the room  CM sent referrals in [employer]  Pt is a tentative dc for tomorrow

## 2019-12-06 NOTE — TELEPHONE ENCOUNTER
Patients GI provider:  Valerie Johnson    Number to return call: [phone]    Reason for call: Pt called requesting to cancel his procedure because he will be in rehab after his hospital stay   Pt stated he will call back to r s    Scheduled procedure/appointment date if applicable: 61/89/[id number]

## 2019-12-06 NOTE — PROGRESS NOTES
Progress Note - Pulmonary   [place] 75 y o  male MRN: [id number]  Unit/Bed#: [address] [place] [address] Encounter: [id number]      Assessment/Plan:     Severe bullous emphysematous COPD:  -FEV1 44%  -chronically on Perforomist, Pulmicort,  -uses duo nebs at home  -has attended pulmonary rehab in the past and has a few lifetime days left available  -will need pulmonary rehab in the outpatient setting  -prescription ordered for the outpatient setting     Alpha-1 antitrypsin deficiency:  -is on weekly Zemaira injections  -patient will need to reschedule for next Wednesday     Chronic hypoxemic respiratory failure:  Currently on 3 L nasal cannula  Baseline for this patient  He does not qualify for ventilator therapy     Chronic dyspnea:  -I discussed with [facility] of Cardiology, they do not feel this is cardiac in nature  -recent echos have been normal  -recent new med stress test were with fixed non reproducible defect  -no suggestions of reversible ischemia  -barring all else likely due to poor reserve  -patient can follow up in the outpatient setting cardiology  -steroids have been weaned to 40 mg daily  -discharge home on 40 mg x5 days then 20 mg 5 x  days      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:  -patient on BuSpar 7 5 mg t i d , increased to 10 mg t i d   And discharge on this dose  -patient had poorly tolerant episodes of benzodiazepines in the past, reports passing out waking up the next day on doses of antianxiety medicine  -pursuant of nonsedating medication     History of Remote PE  -not currently on anticoagulation therapy  -completed 6 months A/C therapy  -no acute PE     Left lung nodule:  -this is new  -patient can undergo outpatient PET-CT to see if characteristic findings of glucose avid lesions  -given his high risk state would need to discuss further options at that time pending results   -discussed this with the patient and he is agreeable to such    Pulmonary signing off  Call office to schedule outpatient appointment  Continue oxygen therapy  Attend pulmonary rehab  Steroids 40 mg x5 days, then 20 mg x5 days  Continue Perforomist and Pulmicort  AmbRefRespTxCM          ______________________________________________________________________    Subjective: Pt seen and examined at bedside  Feels more comfortable today  Not as dyspneic  Not feeling significantly anxious  Tele Events:     Vitals:   Temp:  [97 9 °F (36 6 °C)-98 6 °F (37 °C)] 98 3 °F (36 8 °C)  HR:  [80-92] 80  Resp:  [18-22] 20  BP: (163-182)/(78-86) 174/81  Weight (last 2 days)     None        Oxygen Therapy  SpO2: 97 %  O2 Flow Rate (L/min): 3 L/min    IV Infusions:       Nutrition:        Diet Orders   (From admission, onward)             Start     Ordered    12/03/19 1542  Diet Regular; Regular House  Diet effective now     Question Answer Comment   Diet Type Regular    Regular Regular House    RD to adjust diet per protocol? Yes        12/03/19 1541    12/03/19 1440  Room Service  Once     Question:  Type of Service  Answer:  Room Service-Appropriate    12/03/19 1440                Ins/Outs:   I/O       12/04 0701 - 12/05 0700 12/05 0701 - 12/06 0700 12/06 0701 - 12/07 0700    P  O        I V  (mL/kg) 1801 3 (19 9) 1311 3 (14 5)     Total Intake(mL/kg) 1801 3 (19 9) 1311 3 (14 5)     Urine (mL/kg/hr) 1125 (0 5) 1750 (0 8)     Total Output 1125 1750     Net +676 3 -438 8            Unmeasured Stool Occurrence   1 x          Lines/Drains:  Invasive Devices     Peripheral Intravenous Line            Peripheral IV 12/03/19 Left Antecubital 2 days                 Active medications:  Scheduled Meds:  Current Facility-Administered Medications:  acetaminophen 650 mg Oral Q6H PRN Duane Estrada DO   amLODIPine 10 mg Oral Daily Atul MD Yobany   azithromycin 500 mg Oral Q24H Fraser Pro, MD   budesonide 0 5 mg Nebulization BID Atul Haywood MD   busPIRone 7 5 mg Oral TID Donald Burton PA-C   enoxaparin 40 mg Subcutaneous Daily Lucas Griffin MD   finasteride 5 mg Oral QAM Lucas Griffin MD   formoterol 20 mcg Nebulization BID Lucas Griffin MD   gabapentin 300 mg Oral TID Lucas Griffin MD   ipratropium-albuterol 3 mL Nebulization Q6H Lucas Griffin MD   ipratropium-albuterol 3 mL Nebulization Q4H PRN Lucas Griffin MD   methylPREDNISolone sodium succinate 40 mg Intravenous Q12H [facility] Jose Long DO   pantoprazole 40 mg Oral BID Lcuas Griffin MD   tamsulosin 0 4 mg Oral Daily Lucas Griffin MD   zolpidem 10 mg Oral HS Lucas Griffin MD     PRN Meds:  acetaminophen 650 mg Q6H PRN   ipratropium-albuterol 3 mL Q4H PRN     ____________________________________________________________________      Physical Exam   Constitutional: He is oriented to person, place, and time  He appears well-developed  HENT:   Head: Normocephalic and atraumatic  Eyes: Pupils are equal, round, and reactive to light  Conjunctivae are normal    Neck: Normal range of motion  Neck supple  Cardiovascular: Normal rate, regular rhythm and normal heart sounds  Exam reveals no gallop and no friction rub  No murmur heard  Pulmonary/Chest: Effort normal  No accessory muscle usage  No tachypnea  No respiratory distress  He has decreased breath sounds in the right upper field, the right middle field, the right lower field, the left upper field, the left middle field and the left lower field  He has no wheezes  He has no rhonchi  He has no rales  He exhibits no tenderness  Mild to moderately decreased breath    Currently 97% on 3 L    Appears less dyspneic than the day prior  Abdominal: Soft  Bowel sounds are normal    Musculoskeletal: Normal range of motion  He exhibits no edema  Neurological: He is alert and oriented to person, place, and time  Skin: Skin is warm and dry     Psychiatric: He has a normal mood and affect            ____________________________________________________________________    Labs:   CBC: Results from last 7 days   Lab Units 12/06/19  0511 12/05/19  0513 12/04/19  0452   WBC Thousand/uL 10 33* 10 39* 11 15*   HEMOGLOBIN g/dL 13 0 13 7 13 3   HEMATOCRIT % 38 4 40 9 38 6   MCV fL 97 97 95   PLATELETS Thousands/uL 127* 126* 104*     CMP: Results from last 7 days   Lab Units 12/06/19  0511 12/05/19  0513 12/04/19  0452 12/03/19  1140   POTASSIUM mmol/L 4 4 4 1 4 2 4 1   CHLORIDE mmol/L 101 104 103 103   CO2 mmol/L 28 26 23 26   BUN mg/dL 20 19 20 21   CREATININE mg/dL 0 99 1 05 1 12 1 09   CALCIUM mg/dL 8 4 8 5 8 3 8 8   AST U/L  --  52* 39 49*   ALT U/L  --  150* 142* 170*   ALK PHOS U/L  --  62 60 68   EGFR ml/min/1 73sq m 74 69 64 66     No components found for: ABG    Magnesium:   Results from last 7 days   Lab Units 12/06/19  0511   MAGNESIUM mg/dL 2 0     Phosphorous:   Results from last 7 days   Lab Units 12/06/19  0511   PHOSPHORUS mg/dL 3 8     Troponin:   Results from last 7 days   Lab Units 12/03/19  1140   TROPONIN I ng/mL <0 02     PT/INR:   Results from last 7 days   Lab Units 12/03/19  1140   PTT seconds 23*   INR  0 99     Lactic Acid:     BNP:   Results from last 7 days   Lab Units 12/03/19  1140   NT-PRO BNP pg/mL 101     TSH:     Procalcitonin: Invalid input(s): PROCALCITONIN      Imaging:   Xr Chest Pa & Lateral    Result Date: 11/14/2019  Impression Stable right upper lobe opacity which may be inflammatory in origin  Continued surveillance recommended  Follow-up chest x-ray recommended in one months time  The study was marked in EPIC for significant notification  Workstation performed: [id number]     Xr Chest Pa & Lateral    Result Date: 7/30/2019  Impression No acute cardiopulmonary disease  COPD  Workstation performed: [id number]     Xr Chest Pa & Lateral    Result Date: 7/23/2019  Impression No acute cardiopulmonary disease  Chronic changes  Workstation performed: [id number]     Xr Chest Pa & Lateral    Result Date: 7/2/2019  Impression Discoid atelectasis versus scarring right upper lobe   Workstation performed: [id number]      Ct Chest Without Contrast    Result Date: 12/3/2019  Narrative CT CHEST WITHOUT IV CONTRAST INDICATION:   cough, sob  COMPARISON:  CT chest 10/1/2019  TECHNIQUE: CT examination of the chest was performed without intravenous contrast   Axial, sagittal, and coronal 2D reformatted images were created from the source data and submitted for interpretation  Radiation dose length product (DLP) for this visit:  331 71 mGy-cm   This examination, like all CT scans performed in the [facility], was performed utilizing techniques to minimize radiation dose exposure, including the use of iterative  reconstruction and automated exposure control  FINDINGS: LUNGS:  Stable lower lobe predominant severe pulmonary emphysema in a pattern consistent with this patient's history of alpha-1 antitrypsin deficiency  Stable right basilar bulla measuring 33 mm  No focal infiltrate/consolidation  Stable linear scarring through the right lower lobe  No endotracheal or endobronchial lesion  Reidentified 3 to 4 mm left upper lobe nodule #3/16  PLEURA:  Unremarkable  HEART/GREAT VESSELS:  Atherosclerotic changes are noted in thoracic aorta and coronary arteries  MEDIASTINUM AND MIKE:  Previously seen small hiatal hernia is less well-visualized on today's study  CHEST WALL AND LOWER NECK:   Unremarkable  VISUALIZED STRUCTURES IN THE UPPER ABDOMEN:  Fatty infiltration of the partially imaged liver  Simple appearing cysts in the partially imaged left kidney  OSSEOUS STRUCTURES:  No acute fracture or destructive osseous lesion  Impression Stable lower lobe predominant severe pulmonary emphysema in a pattern consistent with this patient's history of alpha-1 antitrypsin deficiency  No acute findings  Workstation performed: [id number]     Ct Chest With Contrast    Result Date: 10/2/2019  Narrative CT CHEST WITH IV CONTRAST INDICATION:   R91 8: Other nonspecific abnormal finding of lung field   COMPARISON: 06/30/2019, 10/11/2018 TECHNIQUE: CT examination of the chest was performed  Axial, sagittal, and coronal 2D reformatted images were created from the source data and submitted for interpretation  Radiation dose length product (DLP) for this visit:  392 mGy-cm   This examination, like all CT scans performed in the [facility], was performed utilizing techniques to minimize radiation dose exposure, including the use of iterative reconstruction and automated exposure control  IV Contrast:  100 mL of iohexol (OMNIPAQUE) FINDINGS: LUNGS:  Severe emphysematous changes are present  A bulla is seen at the right lung base  New 4 mm left upper lobe nodule, image 14 series 3  No additional new nodules  Chronic airspace disease in the right lower lobe  Stable soft tissue density at the bifurcation of the left lower lobe pulmonary artery  PLEURA:  No pleural effusion  Stable pleural-based calcifications on the right  HEART/GREAT VESSELS:  Unremarkable for patient's age  MEDIASTINUM AND MIKE:  Hiatal hernia  There is some thickening of the distal esophagus    CHEST WALL AND LOWER NECK:   Unremarkable  VISUALIZED STRUCTURES IN THE UPPER ABDOMEN:  There is fatty change in the liver  Cyst in the upper pole of the left kidney    OSSEOUS STRUCTURES:  No acute fracture or destructive osseous lesion  Impression 1  Severe emphysema  2   New 4 mm nodule in the left upper lobe  Recommend CT scan of the chest in 12 months time for follow-up in this high risk patient based on Fleischner Society guidelines  3   Stable soft tissue density at the bifurcation of the left lower lobe pulmonary artery  4   Hiatal hernia with stable thickening of the distal esophagus  This may be on the basis of reflux  The study was marked in EPIC for significant notification  Workstation performed: [id number]          Micro: Lab Results   Component Value Date    BLOODCX No Growth After 5 Days   12/29/2017    BLOODCX No Growth After 5 Days  12/29/2017    WOUNDCULT 3+ Growth of Staphylococcus aureus (A) 06/12/2019    MRSACULTURE  11/11/2019     No Methicillin Resistant Staphlyococcus aureus (MRSA) isolated            Invalid input(s): LEGIONELLAURINARYANTIGEN        Code Status: Level 1 - Full Code

## 2019-12-06 NOTE — PROGRESS NOTES
[address] [facility] [address] [facility] Practice Progress Note - Shahbaz Moya 75 y o  male MRN: [id number]    Unit/Bed#: 3 [place] [address]02 Encounter: [id number]      Assessment/Plan:  Acute on chronic respiratory failure (HCC)  Assessment & Plan  Stable on home 3L NC O2, titrate oxygen as needed  ABG:  PH 7 41, pCO2 33 7, PO2 77 3, H CO3 20 9  Likely secondary to worsening lung function  CT chest:Stable lower lobe predominant severe pulmonary emphysema and hepatic consistent with patient's history of alpha-1 antitrypsin deficiency  Chest x-ray:  No acute cardiopulmonary disease  Solumedrol 60 q 8h IV, plan to taper clinically  Budesonide nebs b i d  Performist nebs b i d   DuoNeb q 6h rest and q 4h p r n  Continue azithromycin 500 milligram IV daily, IV NS 75 cc/hour  -repeat ABG 12/4:  7 38/36/101/21  -await pulmonary recommendations  - CTA performed 12/4 showed irregular 9 mm left lower lobe nodule demonstrating slow enlargement over time and should be viewed highly suspicious for bronchogenic carcinoma  -patient will receive a dose of Solu-Medrol this a m  And will be transitioned at discharge to prednisone 40 mg daily with 10 mg taper every 5th day        * AAT (alpha-1-antitrypsin) deficiency ([facility] [address] )  Assessment & Plan  Follows with Pulmonary  Continue with weekly Zemaira injections, budesonide, and performist nebs  -pulmonary consult appreciate recommendations    Transaminitis  Assessment & Plan  AST 49,  present on admission  Likely secondary to alpha-1 antitrypsin deficiency  Continue to monitor daily CMP    Centrilobular emphysema (HCC)  Assessment & Plan  Continue with budesonide and Perforomist nebs    Lung mass  Assessment & Plan  Patient had a small left lower lobe lung nodule which was found to be 7 x 6 x 9 mm and irregular    Was discussed with patient by pulmonology and suggestion was made to have follow-up PET-CT scan to 3 months outpatient      BPH (benign prostatic hyperplasia)  Assessment & Plan  Stable continue with tamsulosin and finasteride    Essential hypertension  Assessment & Plan  Stable continue with amlodipine 10 milligrams daily  Monitor frequent vital signs    Gastroesophageal reflux disease  Assessment & Plan  Continue with pantoprazole and Zantac      Subjective:   Patient's left lower lobe lung nodule was discussed by pulmonology with recommendation for follow-up PET-CT scan in 2-3 months outpatient  Plan is to discontinue Solu-Medrol after morning dose today and patient will be started on prednisone 40 mg daily with 10 mg taper every 5th day outpatient on discharge  Patient was seen and examined at bedside with head of bed about 30°  Patient no acute distress, seen to be on 3 L oxygen by nasal cannula  Says this morning he was able to go to the bathroom from bed and back without working as hard as on previous days which is an improvement  Patient says his chest tightness is not as prevalent today and shortness of breath feels about the same except for the fact that he was able to get a further distance without symptoms  Denies abdominal pain does have mild distention, denies urinary symptoms including pain or burning with urination  Sodium this a m  139, potassium 4 4, creatinine 0 99, white blood cell count 10 33, hemoglobin 13  "This note has been constructed using a voice recognition system  Therefore there may be syntax, spelling, and/or grammatical errors  Please call if you have any questions  "    Objective:     Vitals: Blood pressure 163/82, pulse 81, temperature 98 6 °F (37 °C), temperature source Oral, resp  rate 20, height 6' 5" (1 956 m), weight 90 3 kg (199 lb), SpO2 97 %  ,Body mass index is 23 6 kg/m²    Wt Readings from Last 3 Encounters:   12/03/19 90 3 kg (199 lb)   12/03/19 90 7 kg (199 lb 14 4 oz)   11/27/19 90 7 kg (200 lb)       Intake/Output Summary (Last 24 hours) at 12/6/2019 0838  Last data filed at 12/6/2019 0513  Gross per 24 hour   Intake 1311 25 ml   Output 1750 ml   Net -438 75 ml       Physical Exam:   General:  AAO x3, in no acute distress  Cardiac:  RRR, no murmur appreciated on auscultation  Pulmonary:  Decreased but improved airway movement this am compared with yesterday  Currently on 3 L oxygen by nasal cannula  CTAB  Abdomen:  Distended, positive bowel sounds, no tenderness to palpation  Extremities:  Distal pulses intact      Recent Results (from the past 24 hour(s))   CBC and differential    Collection Time: 12/06/19  5:11 AM   Result Value Ref Range    WBC 10 33 (H) 4 31 - 10 16 Thousand/uL    RBC 3 98 3 88 - 5 62 Million/uL    Hemoglobin 13 0 12 0 - 17 0 g/dL    Hematocrit 38 4 36 5 - 49 3 %    MCV 97 82 - 98 fL    MCH 32 7 26 8 - 34 3 pg    MCHC 33 9 31 4 - 37 4 g/dL    RDW 13 4 11 6 - 15 1 %    MPV 9 2 8 9 - 12 7 fL    Platelets 729 (L) 113 - 390 Thousands/uL    nRBC 0 /100 WBCs   Basic metabolic panel    Collection Time: 12/06/19  5:11 AM   Result Value Ref Range    Sodium 139 136 - 145 mmol/L    Potassium 4 4 3 5 - 5 3 mmol/L    Chloride 101 100 - 108 mmol/L    CO2 28 21 - 32 mmol/L    ANION GAP 10 4 - 13 mmol/L    BUN 20 5 - 25 mg/dL    Creatinine 0 99 0 60 - 1 30 mg/dL    Glucose 151 (H) 65 - 140 mg/dL    Calcium 8 4 8 3 - 10 1 mg/dL    eGFR 74 ml/min/1 73sq m   Magnesium    Collection Time: 12/06/19  5:11 AM   Result Value Ref Range    Magnesium 2 0 1 6 - 2 6 mg/dL   Phosphorus    Collection Time: 12/06/19  5:11 AM   Result Value Ref Range    Phosphorus 3 8 2 3 - 4 1 mg/dL       Current Facility-Administered Medications   Medication Dose Route Frequency Provider Last Rate Last Dose    acetaminophen (TYLENOL) tablet 650 mg  650 mg Oral Q6H PRN Mauro Escobar,         amLODIPine (NORVASC) tablet 10 mg  10 mg Oral Daily Kacie Britt MD   10 mg at 12/05/19 0913    azithromycin (ZITHROMAX) tablet 500 mg  500 mg Oral Q24H Kacie Britt MD   500 mg at 12/05/19 1334    budesonide (PULMICORT) inhalation solution 0 5 mg  0 5 mg Nebulization BID Kacie Britt, MD   0 5 mg at 12/06/19 0755    busPIRone (BUSPAR) tablet 7 5 mg  7 5 mg Oral TID Danny Remy PA-C   7 5 mg at 12/05/19 2215    enoxaparin (LOVENOX) subcutaneous injection 40 mg  40 mg Subcutaneous Daily Deisy Dickens MD   40 mg at 12/05/19 0912    finasteride (PROSCAR) tablet 5 mg  5 mg Oral QAM Deisy Dickens MD   5 mg at 12/05/19 0912    formoterol (PERFOROMIST) nebulizer solution 20 mcg  20 mcg Nebulization BID Deisy Dickens MD   20 mcg at 12/06/19 0818    gabapentin (NEURONTIN) capsule 300 mg  300 mg Oral TID Deisy Dickens MD   300 mg at 12/05/19 2215    ipratropium-albuterol (DUO-NEB) 0 5-2 5 mg/3 mL inhalation solution 3 mL  3 mL Nebulization Q6H Deisy Dickens MD   3 mL at 12/06/19 0755    ipratropium-albuterol (DUO-NEB) 0 5-2 5 mg/3 mL inhalation solution 3 mL  3 mL Nebulization Q4H PRN Deisy Dickens MD        methylPREDNISolone sodium succinate (Solu-MEDROL) injection 40 mg  40 mg Intravenous Q12H [facility] Jose Long DO   40 mg at 12/05/19 2219    pantoprazole (PROTONIX) EC tablet 40 mg  40 mg Oral BID Deisy Dickens MD   40 mg at 12/05/19 1708    tamsulosin (FLOMAX) capsule 0 4 mg  0 4 mg Oral Daily Deisy Dickens MD   0 4 mg at 12/05/19 0913    zolpidem (AMBIEN) tablet 10 mg  10 mg Oral HS Deisy Dickens MD   10 mg at 12/05/19 2215       Invasive Devices     Peripheral Intravenous Line            Peripheral IV 12/03/19 Left Antecubital 2 days                Lab, Imaging and other studies: I have personally reviewed pertinent reports      VTE Pharmacologic Prophylaxis: Enoxaparin (Lovenox)  VTE Mechanical Prophylaxis: sequential compression device    Jadyn Samuel MD

## 2019-12-06 NOTE — ASSESSMENT & PLAN NOTE
Patient had a small left lower lobe lung nodule which was found to be 7 x 6 x 9 mm and irregular    Was discussed with patient by pulmonology and suggestion was made to have follow-up PET-CT scan to 3 months outpatient

## 2019-12-06 NOTE — PHYSICAL THERAPY NOTE
PT TREATMENT     12/06/19 1125   Pain Assessment   Pain Assessment No/denies pain   Restrictions/Precautions   Other Precautions Fall Risk;O2   General   Chart Reviewed Yes   Family/Caregiver Present No   Subjective   Subjective "better"   Transfers   Sit to Stand 4  Minimal assistance   Additional items Assist x 1;Verbal cues   Stand to Sit 4  Minimal assistance   Additional items Assist x 1;Verbal cues   Ambulation/Elevation   Gait pattern   (slow)   Gait Assistance 4  Minimal assist   Additional items Assist x 1;Verbal cues; Tactile cues   Assistive Device SPC   Distance 60 feet and 40 feet with change in direction and extended rest btwn walks due to max/mod SOB  Balance   Static Sitting Fair +   Static Standing Fair  (w cane)   Dynamic Standing Fair -  (w cane)   Ambulatory Fair -  (w cane)   Activity Tolerance   Activity Tolerance Patient limited by fatigue  (and SOB)   Assessment   Assessment Pt is making progress with endurance and functional mobility  Pt will cont to benefit from skilled PT services  Plan   Treatment/Interventions ADL retraining;Functional transfer training;LE strengthening/ROM; Elevations; Therapeutic exercise; Endurance training;Patient/family training;Equipment eval/education; Bed mobility;Gait training; Compensatory technique education   PT Frequency 5x/wk   Recommendation   Recommendation Short-term skilled PT   Equipment Recommended   (pt has a RW and cane)   Licensure   [employer] System Number  [address] [place] [address] [id number]

## 2019-12-07 LAB
ANION GAP SERPL CALCULATED.3IONS-SCNC: 7 MMOL/L (ref 4–13)
ATRIAL RATE: 80 BPM
BUN SERPL-MCNC: 22 MG/DL (ref 5–25)
CALCIUM SERPL-MCNC: 8.3 MG/DL (ref 8.3–10.1)
CHLORIDE SERPL-SCNC: 102 MMOL/L (ref 100–108)
CO2 SERPL-SCNC: 30 MMOL/L (ref 21–32)
CREAT SERPL-MCNC: 1.02 MG/DL (ref 0.6–1.3)
ERYTHROCYTE [DISTWIDTH] IN BLOOD BY AUTOMATED COUNT: 13.4 % (ref 11.6–15.1)
GFR SERPL CREATININE-BSD FRML MDRD: 72 ML/MIN/1.73SQ M
GLUCOSE SERPL-MCNC: 113 MG/DL (ref 65–140)
HCT VFR BLD AUTO: 37.7 % (ref 36.5–49.3)
HGB BLD-MCNC: 12.7 G/DL (ref 12–17)
MAGNESIUM SERPL-MCNC: 2.1 MG/DL (ref 1.6–2.6)
MCH RBC QN AUTO: 32.4 PG (ref 26.8–34.3)
MCHC RBC AUTO-ENTMCNC: 33.7 G/DL (ref 31.4–37.4)
MCV RBC AUTO: 96 FL (ref 82–98)
NRBC BLD AUTO-RTO: 0 /100 WBCS
P AXIS: -16 DEGREES
PHOSPHATE SERPL-MCNC: 4.2 MG/DL (ref 2.3–4.1)
PLATELET # BLD AUTO: 129 THOUSANDS/UL (ref 149–390)
PLATELET BLD QL SMEAR: ABNORMAL
PMV BLD AUTO: 9.4 FL (ref 8.9–12.7)
POTASSIUM SERPL-SCNC: 4.1 MMOL/L (ref 3.5–5.3)
PR INTERVAL: 138 MS
QRS AXIS: 23 DEGREES
QRSD INTERVAL: 96 MS
QT INTERVAL: 370 MS
QTC INTERVAL: 426 MS
RBC # BLD AUTO: 3.92 MILLION/UL (ref 3.88–5.62)
RBC MORPH BLD: NORMAL
SODIUM SERPL-SCNC: 139 MMOL/L (ref 136–145)
T WAVE AXIS: 31 DEGREES
VENTRICULAR RATE: 80 BPM
WBC # BLD AUTO: 9.81 THOUSAND/UL (ref 4.31–10.16)

## 2019-12-07 PROCEDURE — 80048 BASIC METABOLIC PNL TOTAL CA: CPT | Performed by: STUDENT IN AN ORGANIZED HEALTH CARE EDUCATION/TRAINING PROGRAM

## 2019-12-07 PROCEDURE — 85027 COMPLETE CBC AUTOMATED: CPT | Performed by: STUDENT IN AN ORGANIZED HEALTH CARE EDUCATION/TRAINING PROGRAM

## 2019-12-07 PROCEDURE — 94760 N-INVAS EAR/PLS OXIMETRY 1: CPT

## 2019-12-07 PROCEDURE — 85007 BL SMEAR W/DIFF WBC COUNT: CPT | Performed by: STUDENT IN AN ORGANIZED HEALTH CARE EDUCATION/TRAINING PROGRAM

## 2019-12-07 PROCEDURE — 94640 AIRWAY INHALATION TREATMENT: CPT

## 2019-12-07 PROCEDURE — 99232 SBSQ HOSP IP/OBS MODERATE 35: CPT | Performed by: FAMILY MEDICINE

## 2019-12-07 PROCEDURE — 93010 ELECTROCARDIOGRAM REPORT: CPT | Performed by: INTERNAL MEDICINE

## 2019-12-07 PROCEDURE — 84100 ASSAY OF PHOSPHORUS: CPT | Performed by: STUDENT IN AN ORGANIZED HEALTH CARE EDUCATION/TRAINING PROGRAM

## 2019-12-07 PROCEDURE — 83735 ASSAY OF MAGNESIUM: CPT | Performed by: STUDENT IN AN ORGANIZED HEALTH CARE EDUCATION/TRAINING PROGRAM

## 2019-12-07 RX ADMIN — BUSPIRONE HYDROCHLORIDE 10 MG: 10 TABLET ORAL at 21:53

## 2019-12-07 RX ADMIN — TAMSULOSIN HYDROCHLORIDE 0.4 MG: 0.4 CAPSULE ORAL at 08:37

## 2019-12-07 RX ADMIN — BUSPIRONE HYDROCHLORIDE 10 MG: 10 TABLET ORAL at 18:02

## 2019-12-07 RX ADMIN — IPRATROPIUM BROMIDE AND ALBUTEROL SULFATE 3 ML: 2.5; .5 SOLUTION RESPIRATORY (INHALATION) at 08:15

## 2019-12-07 RX ADMIN — METHYLPREDNISOLONE SODIUM SUCCINATE 40 MG: 40 INJECTION, POWDER, FOR SOLUTION INTRAMUSCULAR; INTRAVENOUS at 08:38

## 2019-12-07 RX ADMIN — ENOXAPARIN SODIUM 40 MG: 40 INJECTION SUBCUTANEOUS at 08:36

## 2019-12-07 RX ADMIN — GABAPENTIN 300 MG: 300 CAPSULE ORAL at 18:02

## 2019-12-07 RX ADMIN — IPRATROPIUM BROMIDE AND ALBUTEROL SULFATE 3 ML: 2.5; .5 SOLUTION RESPIRATORY (INHALATION) at 01:39

## 2019-12-07 RX ADMIN — IPRATROPIUM BROMIDE AND ALBUTEROL SULFATE 3 ML: 2.5; .5 SOLUTION RESPIRATORY (INHALATION) at 13:51

## 2019-12-07 RX ADMIN — IPRATROPIUM BROMIDE AND ALBUTEROL SULFATE 3 ML: 2.5; .5 SOLUTION RESPIRATORY (INHALATION) at 19:26

## 2019-12-07 RX ADMIN — FORMOTEROL FUMARATE DIHYDRATE 20 MCG: 20 SOLUTION RESPIRATORY (INHALATION) at 08:22

## 2019-12-07 RX ADMIN — GABAPENTIN 300 MG: 300 CAPSULE ORAL at 21:53

## 2019-12-07 RX ADMIN — ZOLPIDEM TARTRATE 10 MG: 5 TABLET, COATED ORAL at 21:53

## 2019-12-07 RX ADMIN — FORMOTEROL FUMARATE DIHYDRATE 20 MCG: 20 SOLUTION RESPIRATORY (INHALATION) at 19:38

## 2019-12-07 RX ADMIN — BUDESONIDE 0.5 MG: 0.5 INHALANT RESPIRATORY (INHALATION) at 08:15

## 2019-12-07 RX ADMIN — GABAPENTIN 300 MG: 300 CAPSULE ORAL at 08:36

## 2019-12-07 RX ADMIN — BUDESONIDE 0.5 MG: 0.5 INHALANT RESPIRATORY (INHALATION) at 19:26

## 2019-12-07 RX ADMIN — PANTOPRAZOLE SODIUM 40 MG: 40 TABLET, DELAYED RELEASE ORAL at 08:37

## 2019-12-07 RX ADMIN — PANTOPRAZOLE SODIUM 40 MG: 40 TABLET, DELAYED RELEASE ORAL at 18:02

## 2019-12-07 RX ADMIN — BUSPIRONE HYDROCHLORIDE 10 MG: 10 TABLET ORAL at 08:36

## 2019-12-07 RX ADMIN — FINASTERIDE 5 MG: 5 TABLET, FILM COATED ORAL at 08:36

## 2019-12-07 RX ADMIN — AMLODIPINE BESYLATE 10 MG: 10 TABLET ORAL at 08:36

## 2019-12-07 RX ADMIN — AZITHROMYCIN MONOHYDRATE 500 MG: 250 TABLET ORAL at 13:16

## 2019-12-07 NOTE — NURSING NOTE
Alert and oriented x 3  In bed, no c/o pain  WALKER  At rest respirations easy and regular  Vital signs taken and stable

## 2019-12-07 NOTE — PROGRESS NOTES
[facility] Practice Progress Note - Iris Jones 75 y o  male MRN: [id number]    Unit/Bed#: [address] [place] [address] Encounter: [id number]      Assessment/Plan:  Transaminitis  Assessment & Plan  AST 49,  present on admission  Likely secondary to alpha-1 antitrypsin deficiency  Continue to monitor daily CMP    Centrilobular emphysema (HCC)  Assessment & Plan  Continue with budesonide and Perforomist nebs    Lung mass  Assessment & Plan  Patient had a small left lower lobe lung nodule which was found to be 7 x 6 x 9 mm and irregular  Was discussed with patient by pulmonology and suggestion was made to have follow-up PET-CT scan to 3 months outpatient      BPH (benign prostatic hyperplasia)  Assessment & Plan  Stable continue with tamsulosin and finasteride    Acute on chronic respiratory failure (HCC)  Assessment & Plan  Stable on home 3L [place] O2, titrate oxygen as needed  ABG:  PH 7 41, pCO2 33 7, PO2 77 3, H CO3 20 9  Likely secondary to worsening lung function  CT chest:Stable lower lobe predominant severe pulmonary emphysema and hepatic consistent with patient's history of alpha-1 antitrypsin deficiency  Chest x-ray:  No acute cardiopulmonary disease  Solumedrol 60 q 8h IV, plan to taper clinically  Budesonide nebs b i d  Performist nebs b i d   DuoNeb q 6h rest and q 4h p r n  Continue azithromycin 500 milligram IV daily, IV NS 75 cc/hour  -repeat ABG 12/4:  7 38/36/101/21  -await pulmonary recommendations  - CTA performed 12/4 showed irregular 9 mm left lower lobe nodule demonstrating slow enlargement over time and should be viewed highly suspicious for bronchogenic carcinoma  -patient will receive a dose of Solu-Medrol this a m   And will be transitioned at discharge to prednisone 40 mg daily with 10 mg taper every 5th day        Essential hypertension  Assessment & Plan  Stable continue with amlodipine 10 milligrams daily  Monitor frequent vital signs    Gastroesophageal reflux disease  Assessment & Plan  Continue with pantoprazole and Zantac    * AAT (alpha-1-antitrypsin) deficiency (HCC)  Assessment & Plan  Follows with Pulmonary  Continue with weekly Zemaira injections, budesonide, and performist nebs  -pulmonary consult appreciate recommendations          Subjective:   Patient seen and examined at bedside  Patient sitting up comfortably with nasal cannula oxygen in place  Patient stating that his shortness of breath is much better since the admission  He still reports some shortness of breath and dry cough but states that it is overall better in quality  Patient denies any chest pain  Objective:     Vitals: Blood pressure (!) 179/82, pulse 77, temperature 98 7 °F (37 1 °C), temperature source Oral, resp  rate 20, height 6' 5" (1 956 m), weight 90 3 kg (199 lb), SpO2 93 %  ,Body mass index is 23 6 kg/m²  Wt Readings from Last 3 Encounters:   12/03/19 90 3 kg (199 lb)   12/03/19 90 7 kg (199 lb 14 4 oz)   11/27/19 90 7 kg (200 lb)       Intake/Output Summary (Last 24 hours) at 12/7/2019 0825  Last data filed at 12/7/2019 0501  Gross per 24 hour   Intake 100 ml   Output 400 ml   Net -300 ml       Physical Exam:   General: Pt is AAO x 3, not in any acute distress  Cardio: RRR, S1 and S2 +, no murmurs/rubs/gallops/clicks  Resp: CTA b/l, no wheezes/rales/rhonchi, + decreased breath sounds bilaterally  Abdomen: soft, NT/ND, BS+  Extremities: no cyanosis or edema  PP 2+ b/l         Recent Results (from the past 24 hour(s))   Magnesium    Collection Time: 12/07/19  5:18 AM   Result Value Ref Range    Magnesium 2 1 1 6 - 2 6 mg/dL   Phosphorus    Collection Time: 12/07/19  5:18 AM   Result Value Ref Range    Phosphorus 4 2 (H) 2 3 - 4 1 mg/dL   CBC and differential    Collection Time: 12/07/19  5:18 AM   Result Value Ref Range    WBC 9 81 4 31 - 10 16 Thousand/uL    RBC 3 92 3 88 - 5 62 Million/uL    Hemoglobin 12 7 12 0 - 17 0 g/dL    Hematocrit 37 7 36 5 - 49 3 %    MCV 96 82 - 98 fL    MCH 32 4 26 8 - 34 3 pg    MCHC 33 7 31 4 - 37 4 g/dL    RDW 13 4 11 6 - 15 1 %    MPV 9 4 8 9 - 12 7 fL    Platelets 416 (L) 099 - 390 Thousands/uL    nRBC 0 /100 WBCs   Basic metabolic panel    Collection Time: 12/07/19  5:18 AM   Result Value Ref Range    Sodium 139 136 - 145 mmol/L    Potassium 4 1 3 5 - 5 3 mmol/L    Chloride 102 100 - 108 mmol/L    CO2 30 21 - 32 mmol/L    ANION GAP 7 4 - 13 mmol/L    BUN 22 5 - 25 mg/dL    Creatinine 1 02 0 60 - 1 30 mg/dL    Glucose 113 65 - 140 mg/dL    Calcium 8 3 8 3 - 10 1 mg/dL    eGFR 72 ml/min/1 73sq m   Manual Differential(PHLEBS Do Not Order)    Collection Time: 12/07/19  5:18 AM   Result Value Ref Range    Total Counted      RBC Morphology Normal     Platelet Estimate Borderline (A) Adequate       Current Facility-Administered Medications   Medication Dose Route Frequency Provider Last Rate Last Dose    acetaminophen (TYLENOL) tablet 650 mg  650 mg Oral Q6H PRN Tabby Newby DO        amLODIPine (NORVASC) tablet 10 mg  10 mg Oral Daily Carin Tapia MD   10 mg at 12/06/19 0941    azithromycin (ZITHROMAX) tablet 500 mg  500 mg Oral Q24H Carin Tapia MD   500 mg at 12/06/19 1331    budesonide (PULMICORT) inhalation solution 0 5 mg  0 5 mg Nebulization BID Carin Tapia MD   0 5 mg at 12/07/19 0815    busPIRone (BUSPAR) tablet 10 mg  10 mg Oral TID Edilma Hinton PA-C   10 mg at 12/06/19 2118    enoxaparin (LOVENOX) subcutaneous injection 40 mg  40 mg Subcutaneous Daily Carin Tapia MD   40 mg at 12/06/19 0942    finasteride (PROSCAR) tablet 5 mg  5 mg Oral QAM Carin Tapia MD   5 mg at 12/06/19 0941    formoterol (PERFOROMIST) nebulizer solution 20 mcg  20 mcg Nebulization BID Carin Tapia MD   20 mcg at 12/07/19 7381    gabapentin (NEURONTIN) capsule 300 mg  300 mg Oral TID Carin Tapia MD   300 mg at 12/06/19 2118    ipratropium-albuterol (DUO-NEB) 0 5-2 5 mg/3 mL inhalation solution 3 mL  3 mL Nebulization Q6H Carin Tapia MD   3 mL at 12/07/19 0815    ipratropium-albuterol (DUO-NEB) 0 5-2 5 mg/3 mL inhalation solution 3 mL  3 mL Nebulization Q4H PRN Diana Murphy MD        methylPREDNISolone sodium succinate (Solu-MEDROL) injection 40 mg  40 mg Intravenous Daily Warden Aj PA-C        pantoprazole (PROTONIX) EC tablet 40 mg  40 mg Oral BID Diana Murphy MD   40 mg at 12/06/19 1705    tamsulosin (FLOMAX) capsule 0 4 mg  0 4 mg Oral Daily Diana Murphy MD   0 4 mg at 12/06/19 0941    zolpidem (AMBIEN) tablet 10 mg  10 mg Oral HS Diana Murphy MD   10 mg at 12/06/19 2120       Invasive Devices     Peripheral Intravenous Line            Peripheral IV 12/06/19 Right Wrist less than 1 day                Lab, Imaging and other studies: I have personally reviewed pertinent reports      VTE Pharmacologic Prophylaxis: Enoxaparin (Lovenox)  VTE Mechanical Prophylaxis: sequential compression device    Wilbert Rashid, DO

## 2019-12-07 NOTE — SOCIAL WORK
Pt was accepted today at [facility]  Decided on bed at [facility]  Spoke to [facility] in [facility] admissions, was researching if they could accept pt with Samir VELÁZQUEZ that pt takes weekly  Met with pt, stated there are times he does not take it due to rehab especially  Communicated with Dr Carlos Alberto Proctor about holding medication, agreed it could be held  This occurred at [facility] today  Called to [facility] soon after, stated they may not be able to accept pt today as his clinical would need to be re-evaluated  Requested d/c for tomorrow  Spoke to pt, unsure if his insurance would pay for a [facility] ambulance  Is considering getting his daughter to transport tomorrow  Has his portable oxygen device in the room  Will follow

## 2019-12-07 NOTE — PLAN OF CARE
Problem: Potential for Falls  Goal: Patient will remain free of falls  Description  INTERVENTIONS:  - Assess patient frequently for physical needs  -  Identify cognitive and physical deficits and behaviors that affect risk of falls  -  [place] fall precautions as indicated by assessment   - Educate patient/family on patient safety including physical limitations  - Instruct patient to call for assistance with activity based on assessment  - Modify environment to reduce risk of injury  - Consider OT/PT consult to assist with strengthening/mobility  Outcome: Progressing     Problem: RESPIRATORY - ADULT  Goal: Achieves optimal ventilation and oxygenation  Description  INTERVENTIONS:  - Assess for changes in respiratory status  - Assess for changes in mentation and behavior  - Position to facilitate oxygenation and minimize respiratory effort  - Oxygen administered by appropriate delivery if ordered  - Encourage broncho-pulmonary hygiene including cough, deep breathe, Incentive Spirometry  - Assess and instruct to report SOB or any respiratory difficulty  - Respiratory Therapy support as indicated   Outcome: Progressing     Problem: Potential for Falls  Goal: Patient will remain free of falls  Description  INTERVENTIONS:  - Assess patient frequently for physical needs  -  Identify cognitive and physical deficits and behaviors that affect risk of falls    -  [place] fall precautions as indicated by assessment   - Educate patient/family on patient safety including physical limitations  - Instruct patient to call for assistance with activity based on assessment  - Modify environment to reduce risk of injury  - Consider OT/PT consult to assist with strengthening/mobility  Outcome: Progressing     Problem: RESPIRATORY - ADULT  Goal: Achieves optimal ventilation and oxygenation  Description  INTERVENTIONS:  - Assess for changes in respiratory status  - Assess for changes in mentation and behavior  - Position to facilitate oxygenation and minimize respiratory effort  - Oxygen administered by appropriate delivery if ordered  - Encourage broncho-pulmonary hygiene including cough, deep breathe, Incentive Spirometry  - Assess and instruct to report SOB or any respiratory difficulty  - Respiratory Therapy support as indicated   Outcome: Progressing

## 2019-12-07 NOTE — TRANSPORTATION MEDICAL NECESSITY
Section I - General Information    Name of Patient: Miryam Sapp                 : 1945    Medicare #: [id number]  Transport Date: 19 (PCS is valid for round trips on this date and for all repetitive trips in the 60-day range as noted below )  Origin: [address] [facility] [address] [facility] [address]: [address]   Is the pt's stay covered under Medicare Part A (PPS/DRG)   [x]      Closest appropriate facility? If no, why is transport to more distant facility required? Yes  If hospice pt, is this transport related to pt's terminal illness? NA       Section II - Medical Necessity Questionnaire  Ambulance transportation is medically necessary only if other means of transport are contraindicated or would be potentially harmful to the patient  To meet this requirement, the patient must either be "bed confined" or suffer from a condition such that transport by means other than ambulance is contraindicated by the patient's condition  The following questions must be answered by the medical professional signing below for this form to be valid:    1)  Describe the MEDICAL CONDITION (physical and/or mental) of this patient AT [address] [place] [address] that requires the patient to be transported in an ambulance and why transport by other means is contraindicated by the patient's condition: On oxygen 3-4 liters n/c     2) Is the patient "bed confined" as defined below? No  To be "be confined" the patient must satisfy all three of the following conditions: (1) unable to get up from bed without Assistance; AND (2) unable to ambulate; AND (3) unable to sit in a chair or wheelchair  3) Can this patient safely be transported by car or wheelchair van (i e , seated during transport without a medical attendant or monitoring)?    No    4) In addition to completing questions 1-3 above, please check any of the following conditions that apply*: *Note: supporting documentation for any boxes checked must be maintained in the patient's medical records  If hosp-hosp transfer, describe services needed at 2nd facility not available at 1st facility? Requires oxygen-unable to self administer      Section III - Signature of Physician or Healthcare Professional  I certify that the above information is true and correct based on my evaluation of this patient, and represent that the patient requires transport by ambulance and that other forms of transport are contraindicated  I understand that this information will be used by the Centers for Medicare and Medicaid Services (CMS) to support the determination of medical necessity for ambulance services, and I represent that I have personal knowledge of the patient's condition at time of transport  []  If this box is checked, I also certify that the patient is physically or mentally incapable of signing the ambulance service's claim and that the institution with which I am affiliated has furnished care, services, or assistance to the patient  My signature below is made on behalf of the patient pursuant to 42 CFR §424 36(b)(4)  In accordance with 42 CFR §424 37, the specific reason(s) that the patient is physically or mentally incapable of signing the claim form is as follows: Ivette Gonzales of Physician* or Healthcare Professional______________________________________________________________  Signature Date 12/07/19 (For scheduled repetitive transports, this form is not valid for transports performed more than 60 days after this date)    Printed Name & Credentials of Physician or Healthcare Professional (MD, , RN, etc )_Chun Watkins RN______________  *Form must be signed by patient's attending physician for scheduled, repetitive transports   For non-repetitive, unscheduled ambulance transports, if unable to obtain the signature of the attending physician, any of the following may sign (choose appropriate option below)  [] Physician Assistant []  Clinical Nurse Specialist [x]  Registered Nurse  []  Nurse Practitioner  [x] Discharge Planner

## 2019-12-07 NOTE — DISCHARGE SUMMARY
[facility] Discharge Summary - Medical Gopi Books 75 y o  male MRN: [id number]    [address] [facility] [address] / Bed: [facility] Encounter: [id number]    BRIEF OVERVIEW  Admitting Provider: Steven Baig DO  Discharge Provider:  Steven Baig DO    Discharge To:  [facility]  Facility: [facility]    Outpatient Follow-Up: Dr Brandon Mead (pulmonology)  Things to address at first follow up visit: PET CT for pulmonary nodule follow up  Labs and results pending at discharge: none      Admission Date: 12/3/2019     Discharge Date: 12/9/2019    Primary Discharge Diagnosis  Principal Problem:    AAT (alpha-1-antitrypsin) deficiency ([facility] [address] )  Active Problems:    Gastroesophageal reflux disease    Essential hypertension    BPH (benign prostatic hyperplasia)    Lung mass    Centrilobular emphysema (HCC)    Chronic venous insufficiency    Pulmonary hypertension ([facility] [address] )    Transaminitis  Resolved Problems:    Acute on chronic respiratory failure ([facility] [address] )      Secondary Discharge Diagnoses  Severe bullous emphysematous COPD, Lung mass, centrilobular emphysema, pulmonary hypertension    Consulting Providers   Dr Sue Sweet    Procedures Performed/Pertinent Test results  Results for orders placed or performed during the hospital encounter of 12/03/19   CBC and differential   Result Value Ref Range    WBC 10 32 (H) 4 31 - 10 16 Thousand/uL    RBC 4 56 3 88 - 5 62 Million/uL    Hemoglobin 15 0 12 0 - 17 0 g/dL    Hematocrit 43 3 36 5 - 49 3 %    MCV 95 82 - 98 fL    MCH 32 9 26 8 - 34 3 pg    MCHC 34 6 31 4 - 37 4 g/dL    RDW 13 2 11 6 - 15 1 %    MPV 9 5 8 9 - 12 7 fL    Platelets 507 (L) 728 - 390 Thousands/uL    nRBC 0 /100 WBCs   Protime-INR   Result Value Ref Range    Protime 10 6 9 8 - 12 0 seconds    INR 0 99 0 91 - 1 09   APTT   Result Value Ref Range    PTT 23 (L) 25 - 32 seconds   Comprehensive metabolic panel   Result Value Ref Range    Sodium 139 136 - 145 mmol/L    Potassium 4 1 3 5 - 5 3 mmol/L    Chloride 103 100 - 108 mmol/L    CO2 26 21 - 32 mmol/L    ANION GAP 10 4 - 13 mmol/L    BUN 21 5 - 25 mg/dL    Creatinine 1 09 0 60 - 1 30 mg/dL    Glucose 115 65 - 140 mg/dL    Calcium 8 8 8 3 - 10 1 mg/dL    AST 49 (H) 5 - 45 U/L     (H) 12 - 78 U/L    Alkaline Phosphatase 68 46 - 116 U/L    Total Protein 6 9 6 4 - 8 2 g/dL    Albumin 3 7 3 5 - 5 0 g/dL    Total Bilirubin 1 00 0 20 - 1 00 mg/dL    eGFR 66 ml/min/1 73sq m   Troponin I   Result Value Ref Range    Troponin I <0 02 <=0 04 ng/mL   NT-BNP PRO   Result Value Ref Range    NT-proBNP 101 <450 pg/mL   UA w Reflex to Microscopic w Reflex to Culture   Result Value Ref Range    Color, UA Yellow     Clarity, UA Clear     Specific Gravity, UA >=1 030 1 000 - 1 030    pH, UA 5 0 5 0, 5 5, 6 0, 6 5, 7 0, 7 5, 8 0, 8 5, 9 0    Leukocytes, UA Negative Negative    Nitrite, UA Negative Negative    Protein, UA Negative Negative mg/dl    Glucose,  (1/2%) (A) Negative mg/dl    Ketones, UA Negative Negative mg/dl    Urobilinogen, UA 0 2 0 2, 1 0 E U /dl E U /dl    Bilirubin, UA Negative Negative    Blood, UA Negative Negative   Magnesium   Result Value Ref Range    Magnesium 1 9 1 6 - 2 6 mg/dL   Phosphorus   Result Value Ref Range    Phosphorus 2 6 2 3 - 4 1 mg/dL   Blood gas, arterial   Result Value Ref Range    pH, Arterial 7 411 7 350 - 7 450    PH ART TC 7 412 7 350 - 7 450    pCO2, Arterial 33 7 (L) 36 0 - 44 0 mm Hg    PCO2 (TC) Arterial 33 6 (L) 36 0 - 44 0 mm Hg    pO2, Arterial 77 3 75 0 - 129 0 mm Hg    PO2 (TC) Arterial 76 8 75 0 - 129 0 mm Hg    HCO3, Arterial 20 9 (L) 22 0 - 28 0 mmol/L    Base Excess, Arterial -2 8 mmol/L    O2 Content, Arterial 20 3 16 0 - 23 0 mL/dL    O2 HGB,Arterial  94 7 94 0 - 97 0 %    SOURCE Radial, Left     Temperature 98 5 Degrees Fehrenheit    Nasal Cannula 3    Comprehensive metabolic panel   Result Value Ref Range    Sodium 138 136 - 145 mmol/L    Potassium 4 2 3 5 - 5 3 mmol/L    Chloride 103 100 - 108 mmol/L    CO2 23 21 - 32 mmol/L    ANION GAP 12 4 - 13 mmol/L    BUN 20 5 - 25 mg/dL    Creatinine 1 12 0 60 - 1 30 mg/dL    Glucose 185 (H) 65 - 140 mg/dL    Glucose, Fasting 185 (H) 65 - 99 mg/dL    Calcium 8 3 8 3 - 10 1 mg/dL    AST 39 5 - 45 U/L     (H) 12 - 78 U/L    Alkaline Phosphatase 60 46 - 116 U/L    Total Protein 6 1 (L) 6 4 - 8 2 g/dL    Albumin 3 2 (L) 3 5 - 5 0 g/dL    Total Bilirubin 0 60 0 20 - 1 00 mg/dL    eGFR 64 ml/min/1 73sq m   Magnesium   Result Value Ref Range    Magnesium 1 8 1 6 - 2 6 mg/dL   Phosphorus   Result Value Ref Range    Phosphorus 3 6 2 3 - 4 1 mg/dL   CBC (With Platelets)   Result Value Ref Range    WBC 11 15 (H) 4 31 - 10 16 Thousand/uL    RBC 4 05 3 88 - 5 62 Million/uL    Hemoglobin 13 3 12 0 - 17 0 g/dL    Hematocrit 38 6 36 5 - 49 3 %    MCV 95 82 - 98 fL    MCH 32 8 26 8 - 34 3 pg    MCHC 34 5 31 4 - 37 4 g/dL    RDW 13 1 11 6 - 15 1 %    Platelets 571 (L) 246 - 390 Thousands/uL    MPV 8 8 (L) 8 9 - 12 7 fL   Blood gas, arterial   Result Value Ref Range    pH, Arterial 7 388 7 350 - 7 450    PH ART TC 7 387 7 350 - 7 450    pCO2, Arterial 35 8 (L) 36 0 - 44 0 mm Hg    PCO2 (TC) Arterial 36 0 36 0 - 44 0 mm Hg    pO2, Arterial 101 3 75 0 - 129 0 mm Hg    PO2 (TC) Arterial 101 9 75 0 - 129 0 mm Hg    HCO3, Arterial 21 1 (L) 22 0 - 28 0 mmol/L    Base Excess, Arterial -3 3 mmol/L    O2 Content, Arterial 18 9 16 0 - 23 0 mL/dL    O2 HGB,Arterial  97 1 (H) 94 0 - 97 0 %    SOURCE Radial, Left     FLORENCE TEST Yes     Temperature 98 7 Degrees Fehrenheit    Nasal Cannula 3    CBC and differential   Result Value Ref Range    WBC 10 39 (H) 4 31 - 10 16 Thousand/uL    RBC 4 23 3 88 - 5 62 Million/uL    Hemoglobin 13 7 12 0 - 17 0 g/dL    Hematocrit 40 9 36 5 - 49 3 %    MCV 97 82 - 98 fL    MCH 32 4 26 8 - 34 3 pg    MCHC 33 5 31 4 - 37 4 g/dL    RDW 13 3 11 6 - 15 1 %    MPV 9 5 8 9 - 12 7 fL    Platelets 003 (L) 056 - 390 Thousands/uL    nRBC 0 /100 WBCs    Neutrophils Relative 89 (H) 43 - 75 %    Immat GRANS % 2 0 - 2 %    Lymphocytes Relative 6 (L) 14 - 44 %    Monocytes Relative 3 (L) 4 - 12 %    Eosinophils Relative 0 0 - 6 %    Basophils Relative 0 0 - 1 %    Neutrophils Absolute 9 29 (H) 1 85 - 7 62 Thousands/µL    Immature Grans Absolute 0 19 0 00 - 0 20 Thousand/uL    Lymphocytes Absolute 0 58 (L) 0 60 - 4 47 Thousands/µL    Monocytes Absolute 0 32 0 17 - 1 22 Thousand/µL    Eosinophils Absolute 0 00 0 00 - 0 61 Thousand/µL    Basophils Absolute 0 01 0 00 - 0 10 Thousands/µL   Comprehensive metabolic panel   Result Value Ref Range    Sodium 141 136 - 145 mmol/L    Potassium 4 1 3 5 - 5 3 mmol/L    Chloride 104 100 - 108 mmol/L    CO2 26 21 - 32 mmol/L    ANION GAP 11 4 - 13 mmol/L    BUN 19 5 - 25 mg/dL    Creatinine 1 05 0 60 - 1 30 mg/dL    Glucose 170 (H) 65 - 140 mg/dL    Calcium 8 5 8 3 - 10 1 mg/dL    AST 52 (H) 5 - 45 U/L     (H) 12 - 78 U/L    Alkaline Phosphatase 62 46 - 116 U/L    Total Protein 6 2 (L) 6 4 - 8 2 g/dL    Albumin 3 3 (L) 3 5 - 5 0 g/dL    Total Bilirubin 0 60 0 20 - 1 00 mg/dL    eGFR 69 ml/min/1 73sq m   Phosphorus   Result Value Ref Range    Phosphorus 3 0 2 3 - 4 1 mg/dL   Magnesium   Result Value Ref Range    Magnesium 2 0 1 6 - 2 6 mg/dL   CBC and differential   Result Value Ref Range    WBC 10 33 (H) 4 31 - 10 16 Thousand/uL    RBC 3 98 3 88 - 5 62 Million/uL    Hemoglobin 13 0 12 0 - 17 0 g/dL    Hematocrit 38 4 36 5 - 49 3 %    MCV 97 82 - 98 fL    MCH 32 7 26 8 - 34 3 pg    MCHC 33 9 31 4 - 37 4 g/dL    RDW 13 4 11 6 - 15 1 %    MPV 9 2 8 9 - 12 7 fL    Platelets 553 (L) 059 - 390 Thousands/uL    nRBC 0 /100 WBCs   Basic metabolic panel   Result Value Ref Range    Sodium 139 136 - 145 mmol/L    Potassium 4 4 3 5 - 5 3 mmol/L    Chloride 101 100 - 108 mmol/L    CO2 28 21 - 32 mmol/L    ANION GAP 10 4 - 13 mmol/L    BUN 20 5 - 25 mg/dL    Creatinine 0 99 0 60 - 1 30 mg/dL    Glucose 151 (H) 65 - 140 mg/dL    Calcium 8 4 8 3 - 10 1 mg/dL    eGFR 74 ml/min/1 73sq m   Magnesium   Result Value Ref Range    Magnesium 2 0 1 6 - 2 6 mg/dL   Phosphorus   Result Value Ref Range    Phosphorus 3 8 2 3 - 4 1 mg/dL   Magnesium   Result Value Ref Range    Magnesium 2 1 1 6 - 2 6 mg/dL   Phosphorus   Result Value Ref Range    Phosphorus 4 2 (H) 2 3 - 4 1 mg/dL   CBC and differential   Result Value Ref Range    WBC 9 81 4 31 - 10 16 Thousand/uL    RBC 3 92 3 88 - 5 62 Million/uL    Hemoglobin 12 7 12 0 - 17 0 g/dL    Hematocrit 37 7 36 5 - 49 3 %    MCV 96 82 - 98 fL    MCH 32 4 26 8 - 34 3 pg    MCHC 33 7 31 4 - 37 4 g/dL    RDW 13 4 11 6 - 15 1 %    MPV 9 4 8 9 - 12 7 fL    Platelets 853 (L) 526 - 390 Thousands/uL    nRBC 0 /100 WBCs   Basic metabolic panel   Result Value Ref Range    Sodium 139 136 - 145 mmol/L    Potassium 4 1 3 5 - 5 3 mmol/L    Chloride 102 100 - 108 mmol/L    CO2 30 21 - 32 mmol/L    ANION GAP 7 4 - 13 mmol/L    BUN 22 5 - 25 mg/dL    Creatinine 1 02 0 60 - 1 30 mg/dL    Glucose 113 65 - 140 mg/dL    Calcium 8 3 8 3 - 10 1 mg/dL    eGFR 72 ml/min/1 73sq m   ECG 12 lead   Result Value Ref Range    Ventricular Rate 80 BPM    Atrial Rate 80 BPM    GA Interval 138 ms    QRSD Interval 96 ms    QT Interval 370 ms    QTC Interval 426 ms    P Axis -16 degrees    QRS Axis 23 degrees    T Wave Axis 31 degrees   Manual Differential(PHLEBS Do Not Order)   Result Value Ref Range    Segmented % 75 43 - 75 %    Bands % 1 0 - 8 %    Lymphocytes % 16 14 - 44 %    Monocytes % 7 4 - 12 %    Eosinophils, % 1 0 - 6 %    Basophils % 0 0 - 1 %    Absolute Neutrophils 7 84 (H) 1 85 - 7 62 Thousand/uL    Lymphocytes Absolute 1 65 0 60 - 4 47 Thousand/uL    Monocytes Absolute 0 72 0 00 - 1 22 Thousand/uL    Eosinophils Absolute 0 10 0 00 - 0 40 Thousand/uL    Basophils Absolute 0 00 0 00 - 0 10 Thousand/uL    Total Counted 100     RBC Morphology Normal     Platelet Estimate Borderline (A) Adequate   Manual Differential(PHLEBS Do Not Order)   Result Value Ref Range    Total Counted      RBC Morphology Normal     Platelet Estimate Borderline (A) Adequate     CTA chest pe study   Final Result      1  Irregular 9 mm left lower lobe nodule demonstrating slow enlargement over time should be viewed highly suspicious for bronchogenic carcinoma  Cardiothoracic surgical consultation is advised  2   No acute pulmonary emboli  No change in chronic left lower lobe pulmonary embolus  3  Tiny left pleural effusion  4  Fatty liver  I personally discussed this study with Dr Lupe Reynolds covering for this patient on 12/4/2019 at 5:18 PM                       Workstation performed: [id number]         CT chest without contrast   Final Result      Stable lower lobe predominant severe pulmonary emphysema in a pattern consistent with this patient's history of alpha-1 antitrypsin deficiency  No acute findings  Workstation performed: [id number]         XR chest 1 view portable   Final Result      No acute cardiopulmonary disease  Workstation performed: [id number]             HPI  Thom Ramirez 75year old male with pmh of alpha  1 antitrypsin deficiency, emphysema BPH, and pulmonary hypertension who presents with progressively worsening shortness of breath for 1 week  Occasional cough without sputum production  Reports nasal congestion that is chronic for many years  Utilize nebulizer treatments at home without relief  Was seen at [facility] today and sent over to ER for further evaluation  Sees pulmonologist who prescribed prednisone taper for worsening shortness of breath, currently taking prednisone 20 milligrams daily  Patient wears 3 liters nasal cannula oxygen at home  Typically able to ambulate 20 feet without shortness of breath, however unable to do so currently due to breathing difficulty  Denies any fevers or chills or sick contacts      Hospital Course  Patient was admitted to the [facility] floor under the care of Dr Calista Parra  Patient's presentation with acute on chronic hypoxic respiratory failure shortness of breath deemed likely secondary to progression of alpha-1 antitrypsin deficiency  Patient's arterial blood gas showed pH of 7 41 with pCO2 was 33 7 likely secondary to worsening lung function  Patient's CT chest showed stable lower lobe predominant severe pulmonary emphysema and hepatic consistent with patient's history of a phone antitrypsin deficiency  Patient was started on Solu-Medrol 60 mg Q 8 H budesonide nebulizer b i d , Perforomist b i d , DuoNeb q 6 scheduled and Q 4 p r n  Patient was also initiated on azithromycin 500 mg IV daily  Patient was also found to have transaminitis on admission of AST over ALT of 49/170 likely secondary to alpha-1 antitrypsin deficiency  On the 2nd day of admission patient's ABG had improved showing pH of 7 38 with pCO2 of 36  CT 8 was performed on the day of admission showed irregular 9 mm left lower lobe nodule demonstrating a slow enlargement over time  Pulmonology was consulted who made a recommendation for follow-up PET-CT scan 2-3 months as an outpatient  Patient continue to improve clinically  Patient was deemed stable for discharge on December 9,2019  Patient was discharged to short-term rehab in stable condition with prednisone taper (40 milligrams daily x4 days then 30 milligrams daily x4 days then 20 milligrams daily x4 days then 10 milligrams daily x4 days)  Patient will refrain from weekly Zemaira injection while at short term rehab, and will resume upon discharge       Physical Exam at Discharge  Please refer to daily progress notes    Medications   amLODIPine 10 mg tablet   Commonly known as:  NORVASC   TAKE ONE TABLET BY MOUTH ONE TIME DAILY   Refills:  4           budesonide 0 5 mg/2 mL nebulizer solution   Commonly known as:  PULMICORT   Take 1 vial (0 5 mg total) by nebulization 2 (two) times a day for 5 days Rinse mouth after use  Refills:  0          busPIRone 10 mg tablet   Commonly known as:  BUSPAR   Take 1 tablet (10 mg total) by mouth 3 (three) times a day   Refills:  0          finasteride 5 mg tablet   Commonly known as:  PROSCAR   Take 5 mg by mouth every morning   Refills:  0          formoterol 20 MCG/2ML nebulizer solution   Commonly known as:  PERFOROMIST   Take 20 mcg by nebulization 2 (two) times a day   Refills:  0          gabapentin 300 mg capsule   Commonly known as:  NEURONTIN   Take 1 capsule (300 mg total) by mouth 3 (three) times a day   Refills:  3          ipratropium-albuterol 0 5-2 5 mg/3 mL nebulizer solution   Commonly known as:  DUO-NEB   Take 1 vial (3 mL total) by nebulization 4 (four) times a day   Refills:  6          pantoprazole 40 mg tablet   Commonly known as:  PROTONIX   Take 1 tablet (40 mg total) by mouth 2 (two) times a day   Refills:  5          predniSONE 10 mg tablet   Take 4 tablets daily for 4 days then 3 tablets daily for 4 days then 2 tablets daily for 4 days then 1 tablet daily for 4 days   Refills:  0   What changed:     0 medication strength   0 how much to take   0 how to take this   0 when to take this   0 additional instructions          tamsulosin 0 4 mg   Commonly known as:  FLOMAX   TAKE ONE CAPSULE BY MOUTH ONE TIME DAILY   Refills:  2          zolpidem 10 mg tablet   Commonly known as:  AMBIEN   TAKE ONE TABLET BY MOUTH NIGHTLY AT BEDTIME   Refills:  5               OTHER medications on file      Morning Afternoon Evening Bedtime As Needed    OXYGEN-HELIUM IN   Inhale 2L at rest- 3L with activity   Refills:  0               Allergies  Allergies   Allergen Reactions    Chantix [Varenicline]      Caused prostate infection       Diet restrictions: carb-controlled, cardiac diet  Activity restrictions: as tolerated  Code Status: Level 1 - Full Code    Discharge Condition: stable      Discharge  Statement   I spent 25 minutes discharging the patient   This time was spent on the day of discharge  I had direct contact with the patient on the day of discharge  Additional documentation is required if more than 30 minutes were spent on discharge  --  Ele Galicia MD    "This note has been constructed using a voice recognition system  Therefore there may be syntax, spelling, and/or grammatical errors   Please call if you have any questions "

## 2019-12-08 PROBLEM — J96.20 ACUTE ON CHRONIC RESPIRATORY FAILURE (HCC): Status: RESOLVED | Noted: 2017-12-30 | Resolved: 2019-12-08

## 2019-12-08 LAB
ALBUMIN SERPL BCP-MCNC: 3 G/DL (ref 3.5–5)
ALP SERPL-CCNC: 52 U/L (ref 46–116)
ALT SERPL W P-5'-P-CCNC: 268 U/L (ref 12–78)
ANION GAP SERPL CALCULATED.3IONS-SCNC: 7 MMOL/L (ref 4–13)
AST SERPL W P-5'-P-CCNC: 96 U/L (ref 5–45)
BASOPHILS # BLD MANUAL: 0 THOUSAND/UL (ref 0–0.1)
BASOPHILS NFR MAR MANUAL: 0 % (ref 0–1)
BILIRUB SERPL-MCNC: 0.6 MG/DL (ref 0.2–1)
BUN SERPL-MCNC: 26 MG/DL (ref 5–25)
CALCIUM SERPL-MCNC: 8.3 MG/DL (ref 8.3–10.1)
CHLORIDE SERPL-SCNC: 102 MMOL/L (ref 100–108)
CO2 SERPL-SCNC: 30 MMOL/L (ref 21–32)
CREAT SERPL-MCNC: 1.03 MG/DL (ref 0.6–1.3)
EOSINOPHIL # BLD MANUAL: 0.09 THOUSAND/UL (ref 0–0.4)
EOSINOPHIL NFR BLD MANUAL: 1 % (ref 0–6)
ERYTHROCYTE [DISTWIDTH] IN BLOOD BY AUTOMATED COUNT: 13.3 % (ref 11.6–15.1)
GFR SERPL CREATININE-BSD FRML MDRD: 71 ML/MIN/1.73SQ M
GLUCOSE SERPL-MCNC: 103 MG/DL (ref 65–140)
HCT VFR BLD AUTO: 38.6 % (ref 36.5–49.3)
HGB BLD-MCNC: 12.9 G/DL (ref 12–17)
LYMPHOCYTES # BLD AUTO: 2.34 THOUSAND/UL (ref 0.6–4.47)
LYMPHOCYTES # BLD AUTO: 25 % (ref 14–44)
MAGNESIUM SERPL-MCNC: 2 MG/DL (ref 1.6–2.6)
MCH RBC QN AUTO: 32.2 PG (ref 26.8–34.3)
MCHC RBC AUTO-ENTMCNC: 33.4 G/DL (ref 31.4–37.4)
MCV RBC AUTO: 96 FL (ref 82–98)
MONOCYTES # BLD AUTO: 0.56 THOUSAND/UL (ref 0–1.22)
MONOCYTES NFR BLD: 6 % (ref 4–12)
MYELOCYTES NFR BLD MANUAL: 1 % (ref 0–1)
NEUTROPHILS # BLD MANUAL: 6.27 THOUSAND/UL (ref 1.85–7.62)
NEUTS SEG NFR BLD AUTO: 67 % (ref 43–75)
NRBC BLD AUTO-RTO: 0 /100 WBCS
PHOSPHATE SERPL-MCNC: 4.4 MG/DL (ref 2.3–4.1)
PLATELET # BLD AUTO: 123 THOUSANDS/UL (ref 149–390)
PLATELET BLD QL SMEAR: ABNORMAL
PMV BLD AUTO: 9.4 FL (ref 8.9–12.7)
POTASSIUM SERPL-SCNC: 4.1 MMOL/L (ref 3.5–5.3)
PROT SERPL-MCNC: 5.7 G/DL (ref 6.4–8.2)
RBC # BLD AUTO: 4.01 MILLION/UL (ref 3.88–5.62)
RBC MORPH BLD: NORMAL
SODIUM SERPL-SCNC: 139 MMOL/L (ref 136–145)
TOTAL CELLS COUNTED SPEC: 100
WBC # BLD AUTO: 9.36 THOUSAND/UL (ref 4.31–10.16)

## 2019-12-08 PROCEDURE — 84100 ASSAY OF PHOSPHORUS: CPT | Performed by: STUDENT IN AN ORGANIZED HEALTH CARE EDUCATION/TRAINING PROGRAM

## 2019-12-08 PROCEDURE — 83735 ASSAY OF MAGNESIUM: CPT | Performed by: STUDENT IN AN ORGANIZED HEALTH CARE EDUCATION/TRAINING PROGRAM

## 2019-12-08 PROCEDURE — 94760 N-INVAS EAR/PLS OXIMETRY 1: CPT

## 2019-12-08 PROCEDURE — 85007 BL SMEAR W/DIFF WBC COUNT: CPT | Performed by: STUDENT IN AN ORGANIZED HEALTH CARE EDUCATION/TRAINING PROGRAM

## 2019-12-08 PROCEDURE — 97110 THERAPEUTIC EXERCISES: CPT

## 2019-12-08 PROCEDURE — 85027 COMPLETE CBC AUTOMATED: CPT | Performed by: STUDENT IN AN ORGANIZED HEALTH CARE EDUCATION/TRAINING PROGRAM

## 2019-12-08 PROCEDURE — 99231 SBSQ HOSP IP/OBS SF/LOW 25: CPT | Performed by: FAMILY MEDICINE

## 2019-12-08 PROCEDURE — 94640 AIRWAY INHALATION TREATMENT: CPT

## 2019-12-08 PROCEDURE — 80053 COMPREHEN METABOLIC PANEL: CPT | Performed by: STUDENT IN AN ORGANIZED HEALTH CARE EDUCATION/TRAINING PROGRAM

## 2019-12-08 RX ADMIN — BUSPIRONE HYDROCHLORIDE 10 MG: 10 TABLET ORAL at 21:06

## 2019-12-08 RX ADMIN — TAMSULOSIN HYDROCHLORIDE 0.4 MG: 0.4 CAPSULE ORAL at 09:43

## 2019-12-08 RX ADMIN — METHYLPREDNISOLONE SODIUM SUCCINATE 40 MG: 40 INJECTION, POWDER, FOR SOLUTION INTRAMUSCULAR; INTRAVENOUS at 09:43

## 2019-12-08 RX ADMIN — FORMOTEROL FUMARATE DIHYDRATE 20 MCG: 20 SOLUTION RESPIRATORY (INHALATION) at 08:03

## 2019-12-08 RX ADMIN — BUDESONIDE 0.5 MG: 0.5 INHALANT RESPIRATORY (INHALATION) at 19:21

## 2019-12-08 RX ADMIN — GABAPENTIN 300 MG: 300 CAPSULE ORAL at 09:43

## 2019-12-08 RX ADMIN — GABAPENTIN 300 MG: 300 CAPSULE ORAL at 21:06

## 2019-12-08 RX ADMIN — AMLODIPINE BESYLATE 10 MG: 10 TABLET ORAL at 09:43

## 2019-12-08 RX ADMIN — AZITHROMYCIN MONOHYDRATE 500 MG: 250 TABLET ORAL at 14:51

## 2019-12-08 RX ADMIN — BUSPIRONE HYDROCHLORIDE 10 MG: 10 TABLET ORAL at 09:43

## 2019-12-08 RX ADMIN — BUSPIRONE HYDROCHLORIDE 10 MG: 10 TABLET ORAL at 16:42

## 2019-12-08 RX ADMIN — IPRATROPIUM BROMIDE AND ALBUTEROL SULFATE 3 ML: 2.5; .5 SOLUTION RESPIRATORY (INHALATION) at 19:22

## 2019-12-08 RX ADMIN — PANTOPRAZOLE SODIUM 40 MG: 40 TABLET, DELAYED RELEASE ORAL at 09:43

## 2019-12-08 RX ADMIN — ENOXAPARIN SODIUM 40 MG: 40 INJECTION SUBCUTANEOUS at 09:42

## 2019-12-08 RX ADMIN — IPRATROPIUM BROMIDE AND ALBUTEROL SULFATE 3 ML: 2.5; .5 SOLUTION RESPIRATORY (INHALATION) at 01:51

## 2019-12-08 RX ADMIN — FORMOTEROL FUMARATE DIHYDRATE 20 MCG: 20 SOLUTION RESPIRATORY (INHALATION) at 19:36

## 2019-12-08 RX ADMIN — GABAPENTIN 300 MG: 300 CAPSULE ORAL at 16:42

## 2019-12-08 RX ADMIN — IPRATROPIUM BROMIDE AND ALBUTEROL SULFATE 3 ML: 2.5; .5 SOLUTION RESPIRATORY (INHALATION) at 14:27

## 2019-12-08 RX ADMIN — ZOLPIDEM TARTRATE 10 MG: 5 TABLET, COATED ORAL at 21:06

## 2019-12-08 RX ADMIN — BUDESONIDE 0.5 MG: 0.5 INHALANT RESPIRATORY (INHALATION) at 07:41

## 2019-12-08 RX ADMIN — PANTOPRAZOLE SODIUM 40 MG: 40 TABLET, DELAYED RELEASE ORAL at 16:43

## 2019-12-08 RX ADMIN — IPRATROPIUM BROMIDE AND ALBUTEROL SULFATE 3 ML: 2.5; .5 SOLUTION RESPIRATORY (INHALATION) at 07:41

## 2019-12-08 RX ADMIN — FINASTERIDE 5 MG: 5 TABLET, FILM COATED ORAL at 09:43

## 2019-12-08 NOTE — PLAN OF CARE
Problem: Potential for Falls  Goal: Patient will remain free of falls  Description  INTERVENTIONS:  - Assess patient frequently for physical needs  -  Identify cognitive and physical deficits and behaviors that affect risk of falls    -  [place] fall precautions as indicated by assessment   - Educate patient/family on patient safety including physical limitations  - Instruct patient to call for assistance with activity based on assessment  - Modify environment to reduce risk of injury  - Consider OT/PT consult to assist with strengthening/mobility  Outcome: Progressing     Problem: RESPIRATORY - ADULT  Goal: Achieves optimal ventilation and oxygenation  Description  INTERVENTIONS:  - Assess for changes in respiratory status  - Assess for changes in mentation and behavior  - Position to facilitate oxygenation and minimize respiratory effort  - Oxygen administered by appropriate delivery if ordered  - Encourage broncho-pulmonary hygiene including cough, deep breathe, Incentive Spirometry  - Assess and instruct to report SOB or any respiratory difficulty  - Respiratory Therapy support as indicated   Outcome: Progressing     Problem: PAIN - ADULT  Goal: Verbalizes/displays adequate comfort level or baseline comfort level  Description  Interventions:  - Encourage patient to monitor pain and request assistance  - Assess pain using appropriate pain scale  - Administer analgesics based on type and severity of pain and evaluate response  - Implement non-pharmacological measures as appropriate and evaluate response  - Consider cultural and social influences on pain and pain management  - Notify physician/advanced practitioner if interventions unsuccessful or patient reports new pain  Outcome: Progressing     Problem: DISCHARGE PLANNING  Goal: Discharge to home or other facility with appropriate resources  Description  INTERVENTIONS:  - Identify barriers to discharge w/patient and caregiver  - Arrange for needed discharge resources and transportation as appropriate  - Identify discharge learning needs (meds, wound care, etc )  - Arrange for interpretive services to assist at discharge as needed  - Refer to Case Management Department for coordinating discharge planning if the patient needs post-hospital services based on physician/advanced practitioner order or complex needs related to functional status, cognitive ability, or social support system  Outcome: Progressing     Problem: Knowledge Deficit  Goal: Patient/family/caregiver demonstrates understanding of disease process, treatment plan, medications, and discharge instructions  Description  Complete learning assessment and assess knowledge base    Interventions:  - Provide teaching at level of understanding  - Provide teaching via preferred learning methods  Outcome: Progressing

## 2019-12-08 NOTE — PROGRESS NOTES
[facility] Progress Note - Rocklin Cushing 75 y o  male MRN: [id number]    Unit/Bed#: [address] [place] [address]- Encounter: [id number]      Assessment/Plan:  * AAT (alpha-1-antitrypsin) deficiency [facility])  Assessment & Plan  Follows with Pulmonary  Continue with weekly Zemaira injections, budesonide, and performist nebs  -pulmonary consult appreciate recommendations  -patient will be discharged today with prednisone taper 40 mg x4 days, 30 mg x4 days, 20 mg x4 days, 10 mg x4 days  Acute on chronic respiratory failure (HCC)resolved as of 12/8/2019  Assessment & Plan  Stable on home 3L NC O2, titrate oxygen as needed  ABG:  PH 7 41, pCO2 33 7, PO2 77 3, H CO3 20 9  Likely secondary to worsening lung function  CT chest:Stable lower lobe predominant severe pulmonary emphysema and hepatic consistent with patient's history of alpha-1 antitrypsin deficiency  Chest x-ray:  No acute cardiopulmonary disease  Solumedrol 60 q 8h IV, plan to taper clinically  Budesonide nebs b i d  Performist nebs b i d   DuoNeb q 6h rest and q 4h p r n  Continue azithromycin 500 milligram IV daily, IV NS 75 cc/hour  -repeat ABG 12/4:  7 38/36/101/21  -await pulmonary recommendations  - CTA performed 12/4 showed irregular 9 mm left lower lobe nodule demonstrating slow enlargement over time and should be viewed highly suspicious for bronchogenic carcinoma  -patient will receive a dose of Solu-Medrol this a m  And will be transitioned at discharge to prednisone 40 mg daily with 10 mg taper every 5th day        Transaminitis  Assessment & Plan  AST 49,  present on admission  Likely secondary to alpha-1 antitrypsin deficiency  Continue to monitor daily CMP    Centrilobular emphysema (HCC)  Assessment & Plan  Continue with budesonide and Perforomist nebs    Lung mass  Assessment & Plan  Patient had a small left lower lobe lung nodule which was found to be 7 x 6 x 9 mm and irregular    Was discussed with patient by pulmonology and suggestion was made to have follow-up PET-CT scan to 3 months outpatient      BPH (benign prostatic hyperplasia)  Assessment & Plan  Stable continue with tamsulosin and finasteride    Essential hypertension  Assessment & Plan  Stable continue with amlodipine 10 milligrams daily  Monitor frequent vital signs    Gastroesophageal reflux disease  Assessment & Plan  Continue with pantoprazole and Zantac        Subjective:   Patient Was cleared for discharge yesterday but unable to go to nursing home after one was confirmed later in the day with some question about transportation cost coverage and nursing home required patient be re-evaluated before being sent  Patient will be discharged on prednisone taper 40mg x 4 days, 30 x 4 days, 20 x 4 days, and 10mg x 4 days  Patient this a m  Says he has similar chest tightness that is his baseline, shortness of breath is somewhat improved but about his baseline, denies abdominal pain, constipation, diarrhea, some distention and GI upset, denies hematuria or dysuria  Patient seen and examined at bedside with head of bed about 20 30° and undergoing his nebulization treatment  "This note has been constructed using a voice recognition system  Therefore there may be syntax, spelling, and/or grammatical errors  Please call if you have any questions  "    Objective:     Vitals: Blood pressure (!) 183/80, pulse 82, temperature 98 2 °F (36 8 °C), temperature source Oral, resp  rate (!) 24, height 6' 5" (1 956 m), weight 90 3 kg (199 lb), SpO2 95 %  ,Body mass index is 23 6 kg/m²    Wt Readings from Last 3 Encounters:   12/03/19 90 3 kg (199 lb)   12/03/19 90 7 kg (199 lb 14 4 oz)   11/27/19 90 7 kg (200 lb)       Intake/Output Summary (Last 24 hours) at 12/8/2019 0841  Last data filed at 12/7/2019 1901  Gross per 24 hour   Intake    Output 300 ml   Net -300 ml       Physical Exam:   General:  AAO x3, in no acute distress     Cardiac:  RRR, no murmur appreciated on auscultation  Pulmonary:  Decreased but improved airway movement since admission   CTAB  Abdomen:  Distended, positive bowel sounds, no tenderness to palpation  Extremities:  Distal pulses intact        Recent Results (from the past 24 hour(s))   CBC and differential    Collection Time: 12/08/19  6:31 AM   Result Value Ref Range    WBC 9 36 4 31 - 10 16 Thousand/uL    RBC 4 01 3 88 - 5 62 Million/uL    Hemoglobin 12 9 12 0 - 17 0 g/dL    Hematocrit 38 6 36 5 - 49 3 %    MCV 96 82 - 98 fL    MCH 32 2 26 8 - 34 3 pg    MCHC 33 4 31 4 - 37 4 g/dL    RDW 13 3 11 6 - 15 1 %    MPV 9 4 8 9 - 12 7 fL    Platelets 002 (L) 209 - 390 Thousands/uL    nRBC 0 /100 WBCs   Comprehensive metabolic panel    Collection Time: 12/08/19  6:31 AM   Result Value Ref Range    Sodium 139 136 - 145 mmol/L    Potassium 4 1 3 5 - 5 3 mmol/L    Chloride 102 100 - 108 mmol/L    CO2 30 21 - 32 mmol/L    ANION GAP 7 4 - 13 mmol/L    BUN 26 (H) 5 - 25 mg/dL    Creatinine 1 03 0 60 - 1 30 mg/dL    Glucose 103 65 - 140 mg/dL    Calcium 8 3 8 3 - 10 1 mg/dL    AST 96 (H) 5 - 45 U/L     (H) 12 - 78 U/L    Alkaline Phosphatase 52 46 - 116 U/L    Total Protein 5 7 (L) 6 4 - 8 2 g/dL    Albumin 3 0 (L) 3 5 - 5 0 g/dL    Total Bilirubin 0 60 0 20 - 1 00 mg/dL    eGFR 71 ml/min/1 73sq m   Magnesium    Collection Time: 12/08/19  6:31 AM   Result Value Ref Range    Magnesium 2 0 1 6 - 2 6 mg/dL   Phosphorus    Collection Time: 12/08/19  6:31 AM   Result Value Ref Range    Phosphorus 4 4 (H) 2 3 - 4 1 mg/dL   Manual Differential(PHLEBS Do Not Order)    Collection Time: 12/08/19  6:31 AM   Result Value Ref Range    Segmented % 67 43 - 75 %    Lymphocytes % 25 14 - 44 %    Monocytes % 6 4 - 12 %    Eosinophils, % 1 0 - 6 %    Basophils % 0 0 - 1 %    Myelocytes % 1 0 - 1 %    Absolute Neutrophils 6 27 1 85 - 7 62 Thousand/uL    Lymphocytes Absolute 2 34 0 60 - 4 47 Thousand/uL    Monocytes Absolute 0 56 0 00 - 1 22 Thousand/uL    Eosinophils Absolute 0 09 0 00 - 0 40 Thousand/uL Basophils Absolute 0 00 0 00 - 0 10 Thousand/uL    Total Counted 100     RBC Morphology Normal     Platelet Estimate Borderline (A) Adequate       Current Facility-Administered Medications   Medication Dose Route Frequency Provider Last Rate Last Dose    acetaminophen (TYLENOL) tablet 650 mg  650 mg Oral Q6H PRN Gama Dawson DO        amLODIPine (NORVASC) tablet 10 mg  10 mg Oral Daily Shin Carrero MD   10 mg at 12/07/19 0836    azithromycin (ZITHROMAX) tablet 500 mg  500 mg Oral Q24H Shin Carrero MD   500 mg at 12/07/19 1316    budesonide (PULMICORT) inhalation solution 0 5 mg  0 5 mg Nebulization BID Shin Carrero MD   0 5 mg at 12/08/19 0741    busPIRone (BUSPAR) tablet 10 mg  10 mg Oral TID Karson Servin PA-C   10 mg at 12/07/19 2153    enoxaparin (LOVENOX) subcutaneous injection 40 mg  40 mg Subcutaneous Daily Shin Carrero MD   40 mg at 12/07/19 0836    finasteride (PROSCAR) tablet 5 mg  5 mg Oral QAM Shin Carrero MD   5 mg at 12/07/19 0836    formoterol (PERFOROMIST) nebulizer solution 20 mcg  20 mcg Nebulization BID Shin Carrero MD   20 mcg at 12/08/19 0803    gabapentin (NEURONTIN) capsule 300 mg  300 mg Oral TID Shin Carrero MD   300 mg at 12/07/19 2153    ipratropium-albuterol (DUO-NEB) 0 5-2 5 mg/3 mL inhalation solution 3 mL  3 mL Nebulization Q6H Shin Carrero MD   3 mL at 12/08/19 0741    ipratropium-albuterol (DUO-NEB) 0 5-2 5 mg/3 mL inhalation solution 3 mL  3 mL Nebulization Q4H PRN Shin Carrero MD        methylPREDNISolone sodium succinate (Solu-MEDROL) injection 40 mg  40 mg Intravenous Daily Karson Servin PA-C   40 mg at 12/07/19 0838    pantoprazole (PROTONIX) EC tablet 40 mg  40 mg Oral BID Shin Carrero MD   40 mg at 12/07/19 1802    tamsulosin (FLOMAX) capsule 0 4 mg  0 4 mg Oral Daily Shin Carrero MD   0 4 mg at 12/07/19 0837    zolpidem (AMBIEN) tablet 10 mg  10 mg Oral HS Shin Carrero MD   10 mg at 12/07/19 4033       Invasive Devices Peripheral Intravenous Line            Peripheral IV 12/06/19 Right Wrist 1 day                Lab, Imaging and other studies: I have personally reviewed pertinent reports      VTE Pharmacologic Prophylaxis: Enoxaparin (Lovenox)  VTE Mechanical Prophylaxis: sequential compression device    Maritza Johnson MD

## 2019-12-09 ENCOUNTER — TRANSITIONAL CARE MANAGEMENT (OUTPATIENT)
Dept: FAMILY MEDICINE CLINIC | Facility: CLINIC | Age: 74
End: 2019-12-09

## 2019-12-09 VITALS
BODY MASS INDEX: 23.5 KG/M2 | HEIGHT: 77 IN | WEIGHT: 199 LBS | OXYGEN SATURATION: 96 % | DIASTOLIC BLOOD PRESSURE: 82 MMHG | SYSTOLIC BLOOD PRESSURE: 168 MMHG | RESPIRATION RATE: 18 BRPM | TEMPERATURE: 98.4 F | HEART RATE: 87 BPM

## 2019-12-09 PROCEDURE — 94640 AIRWAY INHALATION TREATMENT: CPT

## 2019-12-09 PROCEDURE — 99238 HOSP IP/OBS DSCHRG MGMT 30/<: CPT | Performed by: FAMILY MEDICINE

## 2019-12-09 PROCEDURE — 94760 N-INVAS EAR/PLS OXIMETRY 1: CPT

## 2019-12-09 PROCEDURE — 99231 SBSQ HOSP IP/OBS SF/LOW 25: CPT | Performed by: FAMILY MEDICINE

## 2019-12-09 RX ORDER — PREDNISONE 10 MG/1
TABLET ORAL
Qty: 40 TABLET | Refills: 0 | Status: SHIPPED | OUTPATIENT
Start: 2019-12-09 | End: 2020-01-30 | Stop reason: HOSPADM

## 2019-12-09 RX ORDER — BUSPIRONE HYDROCHLORIDE 10 MG/1
10 TABLET ORAL 3 TIMES DAILY
Qty: 90 TABLET | Refills: 0 | Status: SHIPPED | OUTPATIENT
Start: 2019-12-09 | End: 2020-03-17

## 2019-12-09 RX ADMIN — IPRATROPIUM BROMIDE AND ALBUTEROL SULFATE 3 ML: 2.5; .5 SOLUTION RESPIRATORY (INHALATION) at 01:52

## 2019-12-09 RX ADMIN — IPRATROPIUM BROMIDE AND ALBUTEROL SULFATE 3 ML: 2.5; .5 SOLUTION RESPIRATORY (INHALATION) at 13:37

## 2019-12-09 RX ADMIN — AMLODIPINE BESYLATE 10 MG: 10 TABLET ORAL at 08:59

## 2019-12-09 RX ADMIN — PANTOPRAZOLE SODIUM 40 MG: 40 TABLET, DELAYED RELEASE ORAL at 08:59

## 2019-12-09 RX ADMIN — AZITHROMYCIN MONOHYDRATE 500 MG: 250 TABLET ORAL at 13:50

## 2019-12-09 RX ADMIN — IPRATROPIUM BROMIDE AND ALBUTEROL SULFATE 3 ML: 2.5; .5 SOLUTION RESPIRATORY (INHALATION) at 08:26

## 2019-12-09 RX ADMIN — GABAPENTIN 300 MG: 300 CAPSULE ORAL at 08:59

## 2019-12-09 RX ADMIN — METHYLPREDNISOLONE SODIUM SUCCINATE 40 MG: 40 INJECTION, POWDER, FOR SOLUTION INTRAMUSCULAR; INTRAVENOUS at 09:04

## 2019-12-09 RX ADMIN — TAMSULOSIN HYDROCHLORIDE 0.4 MG: 0.4 CAPSULE ORAL at 08:59

## 2019-12-09 RX ADMIN — ENOXAPARIN SODIUM 40 MG: 40 INJECTION SUBCUTANEOUS at 09:02

## 2019-12-09 RX ADMIN — BUDESONIDE 0.5 MG: 0.5 INHALANT RESPIRATORY (INHALATION) at 08:26

## 2019-12-09 RX ADMIN — FINASTERIDE 5 MG: 5 TABLET, FILM COATED ORAL at 08:59

## 2019-12-09 RX ADMIN — BUSPIRONE HYDROCHLORIDE 10 MG: 10 TABLET ORAL at 08:59

## 2019-12-09 RX ADMIN — FORMOTEROL FUMARATE DIHYDRATE 20 MCG: 20 SOLUTION RESPIRATORY (INHALATION) at 08:26

## 2019-12-09 NOTE — SOCIAL WORK
CM on-call today  Called to Elda Hewitt at [facility] Admissions this morning as well as just recently  Per Elda Hewitt, she has still not rec'd approval for pt to d/c to [facility] is also trying to assist   Await for call back

## 2019-12-09 NOTE — SOCIAL WORK
CM received call back from Gina w/[facility], a 3:30pm BLS transport has been arranged seeing as pt has oxygen, is a fall risk and gets very SOB w/activity  CM informed pts RN Angel Luis Geiger, daughter Lupe Schreiber, pt and [facility] w/[facility] confirmed she received pts AVS

## 2019-12-09 NOTE — SOCIAL WORK
CM met with pt to provide update regarding STR  CM informed pt that CM spoke w/liaison at [facility] and resident, CM will be sending progress they requested to [facility] stating pt will not need Zemaira injections while in STR, once confirm receipt pt can be transferred to [facility] today  Pt states he is agreeable to go [place] [facility] [place] and informed CM he will need transport arranged

## 2019-12-09 NOTE — SOCIAL WORK
CM contacted Kirby morataya/[facility] who confirmed she received progress note and stated they can accept pt today at [address] [place] [address]  CM met with pt to inform him he has his choice which facility he would like to go to for STR  Pt asked MITUL to contact his daughter [facility] [facility] to decide which facility he should go to  CM contacted [place] who prefers pt go to [facility]  CM will call [employer] [employer] back with transport time  CM called Kirby Nunez back w/ Care line, informed her of same

## 2019-12-09 NOTE — [place] UNIVERSAL TRANSFER FORM
[place] UNIV[place]AL TRANSFER FORM  (ALL ITEMS MUST BE COMPLETED)    1  TRANSFER FROM: [facility] [facility]      TRANSFER TO: [place]    2  DATE OF TRANSFER: 12/9/2019                        TIME OF TRANSFER: 1530    3  PATIENT NAME: GEOVANY Reich      YOB: 1945                             GENDER: male    4  LANGUAGE:   English    5  PHYSICIAN NAME:  Jefry Madera DO                   PHONE: [phone]    6  CODE STATUS: Level 1 - Full Code        Out of Hospital DNR Attached: No    7  :                                      :  Extended Emergency Contact Information  Primary Emergency Contact: Lucila Ferreira   [place]  Mobile Phone: [phone]  Relation: Daughter           Health Care Representative/Proxy:  Yes           Legal Guardian:  Yes             NAME OF:           HEALTH CARE REPRESENTATIVE/PROXY:                                         OR           LEGAL GUARDIAN, IF NOT :                                               PHONE:  (Day)           (Night)                        (Cell)    8  REASON FOR TRANSFER: (Must include brief medical history and recent changes in physical function or cognition ) STR            V/S: /82 (BP Location: Right arm)   Pulse 87   Temp 98 4 °F (36 9 °C) (Oral)   Resp 18   Ht 6' 5" (1 956 m)   Wt 90 3 kg (199 lb)   SpO2 96%   BMI 23 60 kg/m²           PAIN: None    9  PRIMARY DIAGNOSIS: AAT (alpha-1-antitrypsin) deficiency (HCC)      Secondary Diagnosis:         Pacemaker: No      Internal Defib: No          Mental Health Diagnosis (if Applicable):    10  RESTRAINTS: No     11  RESPIRATORY NEEDS: Oxygen Device nasal cannula, and Flow rate: 3 Liters    12  ISOLATION/PRECAUTION: None    13  ALLERGY: Chantix [varenicline]    14  SENSORY:       Vision Good, Hearing Good  and Speech Clear    15  SKIN CONDITION: No Wounds    16  DIET: Regular    17   IV ACCESS: None    18  PERSONAL ITEMS SENT WITH PATIENT: Cane, Dentures Upper Full and Lower Full and Otheroxygen tank, and luggage     19  ATTACHED DOCUMENTS: MUST ATTACH CURRENT MEDICATION INFORMATION Face Sheet, MAR, Medication Reconciliation, Labs, Code Status, Discharge Summary, PT Note, OT Note and HX/PE    20  AT RISK ALERTS:None        HARM TO: N/A    21  WEIGHT BEARING STATUS:         Left Leg: Full        Right Leg: Full    22  MENTAL STATUS:Alert and Oriented    23  FUNCTION:        Walk: Self        Transfer: Self        Toilet: Self        Feed: Self    24  IMMUNIZATIONS/SCREENING:     Immunization History   Administered Date(s) Administered    INFLUENZA 09/27/2016, 09/27/2018, 09/28/2019    Influenza TIV (IM) 09/27/2013, 10/23/2014    Pneumococcal Conjugate 13-Valent 04/26/2016    Pneumococcal Polysaccharide PPV23 04/23/2011    Tdap 04/26/2016    Zoster 10/29/2014, 04/27/2015, 07/24/2018    Zoster Vaccine Recombinant 05/24/2018, 07/24/2018       25  BOWEL: Continent    26  BLADDER: Continent    27   SENDING FACILITY CONTACT: [facility]                 Title: RN           Unit: [address] [place] [address]        Phone: [phone] [address] (if known):        Title:        Unit:         Phone:         FORM PREFILLED BY (if applicable)       Title:       Unit:        Phone:         FORM COMPLETED BY Sharif Castanon RN      Title:EMY        Phone: [phone]

## 2019-12-09 NOTE — NURSING NOTE
Pt transferred to [address] [place] [address] via transport team for STR  Paperwork given to transport team  Pt in possession of belongings  IV removed intact and spoke with  Ant at [address] [place] [address] for report

## 2019-12-09 NOTE — TRANSPORTATION MEDICAL NECESSITY
Section I - General Information    Name of Patient: Iris Jones                 : 1945    Medicare #: [id number]  Transport Date: 19 (PCS is valid for round trips on this date and for all repetitive trips in the 60-day range as noted below )  Origin: [address] [facility] [address]: [address] [facility]  Is the pt's stay covered under Medicare Part A (PPS/DRG)   [x]     Closest appropriate facility? If no, why is transport to more distant facility required? Yes  If hospice pt, is this transport related to pt's terminal illness? NA       Section II - Medical Necessity Questionnaire  Ambulance transportation is medically necessary only if other means of transport are contraindicated or would be potentially harmful to the patient  To meet this requirement, the patient must either be "bed confined" or suffer from a condition such that transport by means other than ambulance is contraindicated by the patient's condition  The following questions must be answered by the medical professional signing below for this form to be valid:    1)  Describe the MEDICAL CONDITION (physical and/or mental) of this patient AT [address] [place] [address] that requires the patient to be transported in an ambulance and why transport by other means is contraindicated by the patient's condition  Pt wears oxygen, is a fall risk and gets very SOB w/activity  2) Is the patient "bed confined" as defined below? No  To be "be confined" the patient must satisfy all three of the following conditions: (1) unable to get up from bed without Assistance; AND (2) unable to ambulate; AND (3) unable to sit in a chair or wheelchair  3) Can this patient safely be transported by car or wheelchair van (i e , seated during transport without a medical attendant or monitoring)?    No    4) In addition to completing questions 1-3 above, please check any of the following conditions that apply*:   *Note: supporting documentation for any boxes checked must be maintained in the patient's medical records  If hosp-hosp transfer, describe services needed at 2nd facility not available at 1st facility? Requires oxygen-unable to self administer      Section III - Signature of Physician or Healthcare Professional  I certify that the above information is true and correct based on my evaluation of this patient, and represent that the patient requires transport by ambulance and that other forms of transport are contraindicated  I understand that this information will be used by the Centers for Medicare and Medicaid Services (CMS) to support the determination of medical necessity for ambulance services, and I represent that I have personal knowledge of the patient's condition at time of transport  []  If this box is checked, I also certify that the patient is physically or mentally incapable of signing the ambulance service's claim and that the institution with which I am affiliated has furnished care, services, or assistance to the patient  My signature below is made on behalf of the patient pursuant to 42 CFR §424 36(b)(4)  In accordance with 42 CFR §424 37, the specific reason(s) that the patient is physically or mentally incapable of signing the claim form is as follows:       Signature of Physician* or Healthcare Professional______________________________________________________________  Signature Date 12/09/19 (For scheduled repetitive transports, this form is not valid for transports performed more than 60 days after this date)    Printed Name & Credentials of Physician or Healthcare Professional (MD, DO, RN, etc )_Kwame Shah___________________  *Form must be signed by patient's attending physician for scheduled, repetitive transports   For non-repetitive, unscheduled ambulance transports, if unable to obtain the signature of the attending physician, any of the following may sign (choose appropriate option below)  [] Physician Assistant []  Clinical Nurse Specialist []  Registered Nurse  []  Nurse Practitioner  [x] Discharge Planner

## 2019-12-09 NOTE — SOCIAL WORK
Day 5, Pt is not a MB  CM contacted Gentry Jacobs in [facility] Admissions regarding pt being accepted at their facility for STR  Gentry Jacobs stated she needs a progress note from physician stating pt will not need Zemaria injection while at rehab and pts orders faxed to her then she will be able to accept pt at [facility] today  CM informed resident Dr Sherri Benoit of same

## 2019-12-09 NOTE — SOCIAL WORK
CM called [facility] to arrange transport, awaiting call back from [facility]

## 2019-12-09 NOTE — PROGRESS NOTES
[facility] Practice Progress Note - Shahbaz Moya 75 y o  male MRN: [id number]    Unit/Bed#: [address] [place] [address] Encounter: [id number]      Assessment/Plan:  * AAT (alpha-1-antitrypsin) deficiency [facility])  Assessment & Plan  Follows with Pulmonary  Continue with weekly Zemaira injections, budesonide, and performist nebs  -pulmonary consult appreciate recommendations  -patient's discharge has been postponed after weekend delay in nursing home acceptance   -plan for patient to be discharged today with prednisone taper 40 mg x4 days, 30 mg x4 days, 20 mg x4 days, 10 mg x4 days   -Patient will not need Zemaira treatment while in Short Term Rehab    Chronic Hypoxic Respiratory Failure  Assessment & Plan  Stable on home 3L NC O2, titrate oxygen as needed  ABG:  PH 7 41, pCO2 33 7, PO2 77 3, H CO3 20 9  Likely secondary to worsening lung function  CT chest:Stable lower lobe predominant severe pulmonary emphysema and hepatic consistent with patient's history of alpha-1 antitrypsin deficiency  Chest x-ray:  No acute cardiopulmonary disease  Solumedrol 60 q 8h IV, plan to taper clinically  Budesonide nebs b i d  Performist nebs b i d   DuoNeb q 6h rest and q 4h p r n  Continue azithromycin 500 milligram IV daily, IV NS 75 cc/hour  -repeat ABG 12/4:  7 38/36/101/21  -await pulmonary recommendations  - CTA performed 12/4 showed irregular 9 mm left lower lobe nodule demonstrating slow enlargement over time and should be viewed highly suspicious for bronchogenic carcinoma  Currently stable on 3L Oxygen via nasal cannula  Does not unfortunately qualify for ventilator therapy         Transaminitis  Assessment & Plan  AST 49,  present on admission  Likely secondary to alpha-1 antitrypsin deficiency  Continue to monitor daily CMP    Centrilobular emphysema (HCC)  Assessment & Plan  Continue with budesonide and Perforomist nebs    Lung mass  Assessment & Plan  Patient had a small left lower lobe lung nodule which was found to be 7 x 6 x 9 mm and irregular  Was discussed with patient by pulmonology and suggestion was made to have follow-up PET-CT scan to 3 months outpatient      BPH (benign prostatic hyperplasia)  Assessment & Plan  Stable continue with tamsulosin and finasteride    Essential hypertension  Assessment & Plan  Stable continue with amlodipine 10 milligrams daily  Monitor frequent vital signs    Gastroesophageal reflux disease  Assessment & Plan  Continue with pantoprazole and Zantac        Subjective:   Due to issues with nursing home acceptance after placement was already found, patient was not discharged over the weekend unfortunately  If all goes well today per schedule he will be discharged to nursing home for short-term rehab  Patient will not need Zemaira treatment while in [address]  Patient has been afebrile over night with vitals within normal limits  He continues on 3 L oxygen via nasal cannula and is saturating around 94-97%  Patient says his breathing has improved significantly since admission he is able to walk to the bathroom back which he could not do previously  "This note has been constructed using a voice recognition system  Therefore there may be syntax, spelling, and/or grammatical errors  Please call if you have any questions  "    Objective:     Vitals: Blood pressure 168/82, pulse 87, temperature 98 4 °F (36 9 °C), temperature source Oral, resp  rate 18, height 6' 5" (1 956 m), weight 90 3 kg (199 lb), SpO2 96 %  ,Body mass index is 23 6 kg/m²  Wt Readings from Last 3 Encounters:   12/03/19 90 3 kg (199 lb)   12/03/19 90 7 kg (199 lb 14 4 oz)   11/27/19 90 7 kg (200 lb)     No intake or output data in the 24 hours ending 12/09/19 0936    Physical Exam:   General:  AAO x3, in no acute distress     Cardiac:  RRR, no murmur appreciated on auscultation  Pulmonary:  Decreased but improved airway movement since admission   CTAB  Abdomen:  Distended, positive bowel sounds, no tenderness to palpation  Extremities:  Distal pulses intact  No results found for this or any previous visit (from the past 24 hour(s))      Current Facility-Administered Medications   Medication Dose Route Frequency Provider Last Rate Last Dose    acetaminophen (TYLENOL) tablet 650 mg  650 mg Oral Q6H PRN Shea Bullock DO        amLODIPine (NORVASC) tablet 10 mg  10 mg Oral Daily Trey Turpin MD   10 mg at 12/09/19 0859    azithromycin (ZITHROMAX) tablet 500 mg  500 mg Oral Q24H Trey Turpin MD   500 mg at 12/08/19 1451    budesonide (PULMICORT) inhalation solution 0 5 mg  0 5 mg Nebulization BID Trey Turpin MD   0 5 mg at 12/09/19 0826    busPIRone (BUSPAR) tablet 10 mg  10 mg Oral TID Dc Beverly PA-C   10 mg at 12/09/19 0859    enoxaparin (LOVENOX) subcutaneous injection 40 mg  40 mg Subcutaneous Daily Trey Turpin MD   40 mg at 12/09/19 0902    finasteride (PROSCAR) tablet 5 mg  5 mg Oral QAM Trey Turpin MD   5 mg at 12/09/19 0859    formoterol (PERFOROMIST) nebulizer solution 20 mcg  20 mcg Nebulization BID Trey Turpin MD   20 mcg at 12/09/19 0826    gabapentin (NEURONTIN) capsule 300 mg  300 mg Oral TID Trey Turpin MD   300 mg at 12/09/19 0859    ipratropium-albuterol (DUO-NEB) 0 5-2 5 mg/3 mL inhalation solution 3 mL  3 mL Nebulization Q6H Trey Turpin MD   3 mL at 12/09/19 0826    ipratropium-albuterol (DUO-NEB) 0 5-2 5 mg/3 mL inhalation solution 3 mL  3 mL Nebulization Q4H PRN Trey Turpin MD        methylPREDNISolone sodium succinate (Solu-MEDROL) injection 40 mg  40 mg Intravenous Daily Dc Beverly PA-C   40 mg at 12/09/19 0904    pantoprazole (PROTONIX) EC tablet 40 mg  40 mg Oral BID Trey Turpin MD   40 mg at 12/09/19 0859    tamsulosin (FLOMAX) capsule 0 4 mg  0 4 mg Oral Daily Trey Turpin MD   0 4 mg at 12/09/19 0859    zolpidem (AMBIEN) tablet 10 mg  10 mg Oral HS Trey Turpin MD   10 mg at 12/08/19 2108       Invasive Devices     Peripheral Intravenous Line            Peripheral IV 12/06/19 Right Wrist 2 days                Lab, Imaging and other studies: I have personally reviewed pertinent reports      VTE Pharmacologic Prophylaxis: Enoxaparin (Lovenox)  VTE Mechanical Prophylaxis: sequential compression device    Yaneli Cullen MD

## 2019-12-09 NOTE — PLAN OF CARE
Problem: Potential for Falls  Goal: Patient will remain free of falls  Description  INTERVENTIONS:  - Assess patient frequently for physical needs  -  Identify cognitive and physical deficits and behaviors that affect risk of falls    -  [place] fall precautions as indicated by assessment   - Educate patient/family on patient safety including physical limitations  - Instruct patient to call for assistance with activity based on assessment  - Modify environment to reduce risk of injury  - Consider OT/PT consult to assist with strengthening/mobility  Outcome: Progressing     Problem: RESPIRATORY - ADULT  Goal: Achieves optimal ventilation and oxygenation  Description  INTERVENTIONS:  - Assess for changes in respiratory status  - Assess for changes in mentation and behavior  - Position to facilitate oxygenation and minimize respiratory effort  - Oxygen administered by appropriate delivery if ordered  - Encourage broncho-pulmonary hygiene including cough, deep breathe, Incentive Spirometry  - Assess and instruct to report SOB or any respiratory difficulty  - Respiratory Therapy support as indicated   Outcome: Progressing     Problem: PAIN - ADULT  Goal: Verbalizes/displays adequate comfort level or baseline comfort level  Description  Interventions:  - Encourage patient to monitor pain and request assistance  - Assess pain using appropriate pain scale  - Administer analgesics based on type and severity of pain and evaluate response  - Implement non-pharmacological measures as appropriate and evaluate response  - Consider cultural and social influences on pain and pain management  - Notify physician/advanced practitioner if interventions unsuccessful or patient reports new pain  Outcome: Progressing     Problem: DISCHARGE PLANNING  Goal: Discharge to home or other facility with appropriate resources  Description  INTERVENTIONS:  - Identify barriers to discharge w/patient and caregiver  - Arrange for needed discharge resources and transportation as appropriate  - Identify discharge learning needs (meds, wound care, etc )  - Arrange for interpretive services to assist at discharge as needed  - Refer to Case Management Department for coordinating discharge planning if the patient needs post-hospital services based on physician/advanced practitioner order or complex needs related to functional status, cognitive ability, or social support system  Outcome: Progressing     Problem: Knowledge Deficit  Goal: Patient/family/caregiver demonstrates understanding of disease process, treatment plan, medications, and discharge instructions  Description  Complete learning assessment and assess knowledge base    Interventions:  - Provide teaching at level of understanding  - Provide teaching via preferred learning methods  Outcome: Progressing

## 2019-12-09 NOTE — SOCIAL WORK
CM faxed progress note that pt will not need Zemaira injections while at [address] [place] to [address] to Geovanna's attention  CM Tigertexted Resident Lila Alejandro informing her [facility] requested progress note and pts orders be faxed to them prior to accepting pt today  CM unable to print AVS as it has not been signed off yet  Once signed CM will fax

## 2019-12-12 ENCOUNTER — NURSING HOME VISIT (OUTPATIENT)
Dept: GERIATRICS | Facility: OTHER | Age: 74
End: 2019-12-12
Payer: MEDICARE

## 2019-12-12 DIAGNOSIS — K21.00 GASTROESOPHAGEAL REFLUX DISEASE WITH ESOPHAGITIS: ICD-10-CM

## 2019-12-12 DIAGNOSIS — I10 ESSENTIAL HYPERTENSION: ICD-10-CM

## 2019-12-12 DIAGNOSIS — E88.01 AAT (ALPHA-1-ANTITRYPSIN) DEFICIENCY (HCC): ICD-10-CM

## 2019-12-12 DIAGNOSIS — J96.11 CHRONIC RESPIRATORY FAILURE WITH HYPOXIA (HCC): Primary | ICD-10-CM

## 2019-12-12 DIAGNOSIS — G47.00 INSOMNIA, UNSPECIFIED TYPE: ICD-10-CM

## 2019-12-12 DIAGNOSIS — R26.2 AMBULATORY DYSFUNCTION: ICD-10-CM

## 2019-12-12 DIAGNOSIS — R91.8 LUNG NODULES: ICD-10-CM

## 2019-12-12 DIAGNOSIS — N40.1 BENIGN PROSTATIC HYPERPLASIA WITH LOWER URINARY TRACT SYMPTOMS, SYMPTOM DETAILS UNSPECIFIED: Chronic | ICD-10-CM

## 2019-12-12 DIAGNOSIS — R53.1 WEAKNESS GENERALIZED: ICD-10-CM

## 2019-12-12 PROCEDURE — 99306 1ST NF CARE HIGH MDM 50: CPT | Performed by: FAMILY MEDICINE

## 2019-12-12 NOTE — PROGRESS NOTES
[address], [address] [place] [address]   [place]  History and Physical    NAME: Miryam Sapp  AGE: 75 y o  SEX: male [id number]    DATE OF ENCOUNTER: 12/13/2019    Code status:  CPR    Assessment and Plan     1  Chronic respiratory failure with hypoxia ([facility] [address] ): stable     - cont 3 lit of O2 via NC      - cont Formeterol, Budesonide, neb treatments    2  Weakness generalized     - PT/OT    3  Ambulatory dysfunction     - Fall precautions     - PT/OT ordered    4  AAT (alpha-1-antitrypsin) deficiency (HCC)     - cont neb treatments, Budesonide, Formeterol    5  Benign prostatic hyperplasia with lower urinary tract symptoms, symptom details unspecified     - cont Finasteride, Tamsulosin    6  Essential hypertension     - cont Amlodipine,     7  Gastroesophageal reflux disease with esophagitis     - cont pantoprazole     8  Insomnia, unspecified type  Cont Zolpidem    9  Lung nodules     F/u with PET scan  CBC, CMP ordered  All medications and routine orders were reviewed and updated as needed  Plan discussed with: patient    Chief Complaint     Seen for admission at [address] [place] [address], a 76 y/o male admitted to the hospital with acute on chronic respiratory failure  CT chest showed stable lower lobe severe pulmonary emphysema  He was started on solumedrol, azithromycin and continued neb treatments  His overall condition improved  CT chest showed an irregular 9 mm left lower lobe nodule with slow enlargement, Pulmonology recommended follow up PET scan in 2-3 months  He was seen and examined at bedside, stable  No new c/o  He is on 3 lit O2 via NC, denies any SOB at rest  He is using incentive spirometry  He lives alone at home, independent of ADLs and IADLs       HISTORY:  Past Medical History:   Diagnosis Date    Anesthesia complication     Difficult to wake up    Arthritis     BPH (benign prostatic hyperplasia) urinary frequency    Cancer (HCC)     basal cell neck, face    COPD (chronic obstructive pulmonary disease) (HCC)     Full dentures     Hiatal hernia     History of methicillin resistant staphylococcus aureus (MRSA)     10/11/2018 MRSA (nares) positive    Hypertension     controlled    Irritable bowel syndrome     Kidney stone     at least 7 episodes    Liver disease     Alpha 1- enzyme deficiency - diagnosed   has been on weekly replacement therapy since then    Pulmonary emphysema ([address] )     1/25/15  FEV1 - 2 45 liters or 59% of predicted    RSV infection 2017    Wears glasses     for driving only     Family History   Problem Relation Age of Onset    Emphysema Mother         never smoked    Emphysema Father     Cancer Brother         colon    Colon cancer Brother     Ulcerative colitis Family     Liver disease Family      Social History     Socioeconomic History    Marital status:       Spouse name: None    Number of children: None    Years of education: None    Highest education level: None   Occupational History    None   Social Needs    Financial resource strain: None    Food insecurity:     Worry: None     Inability: None    Transportation needs:     Medical: None     Non-medical: None   Tobacco Use    Smoking status: Former Smoker     Packs/day: 1 00     Years: 60 00     Pack years: 60 00     Last attempt to quit: 2017     Years since quittin 2    Smokeless tobacco: Never Used    Tobacco comment: quit in 2017   Substance and Sexual Activity    Alcohol use: Not Currently     Comment: stopped in     Drug use: No    Sexual activity: None   Lifestyle    Physical activity:     Days per week: None     Minutes per session: None    Stress: None   Relationships    Social connections:     Talks on phone: None     Gets together: None     Attends Restoration service: None     Active member of club or organization: None     Attends meetings of clubs or organizations: None     Relationship status: None    Intimate partner violence:     Fear of current or ex partner: None     Emotionally abused: None     Physically abused: None     Forced sexual activity: None   Other Topics Concern    None   Social History Narrative    None       Allergies: Allergies   Allergen Reactions    Chantix [Varenicline]      Caused prostate infection       Review of Systems     Review of Systems   Constitutional: Positive for fatigue  HENT: Negative  Eyes: Negative  Respiratory: Positive for shortness of breath  Negative for wheezing  Cardiovascular: Negative  Gastrointestinal: Negative  Genitourinary: Negative  Musculoskeletal: Positive for gait problem  Neurological: Positive for weakness  Psychiatric/Behavioral: Negative  All other systems reviewed and are negative  Medications and orders     All medications reviewed and updated in Nursing Home EMR  Objective     Vitals: T: 97 8, P: 76, R: 18, BP: 128/70, 97% on 3 lit of O2, Wt: 198 lbs    Physical Exam   Constitutional: He is oriented to person, place, and time  He appears well-developed and well-nourished  No distress  HENT:   Head: Normocephalic and atraumatic  Mouth/Throat: Oropharynx is clear and moist  No oropharyngeal exudate  Eyes: Pupils are equal, round, and reactive to light  EOM are normal  Right eye exhibits no discharge  Left eye exhibits no discharge  No scleral icterus  Neck: Normal range of motion  Neck supple  Cardiovascular: Normal rate, regular rhythm and normal heart sounds  Exam reveals no friction rub  No murmur heard  Pulmonary/Chest: Effort normal and breath sounds normal  No respiratory distress  He has no wheezes  He exhibits no tenderness  Decreased breath sounds throughout  Abdominal: Soft  Bowel sounds are normal  He exhibits no distension  There is no tenderness  Musculoskeletal: Normal range of motion   He exhibits no edema, tenderness or deformity  Neurological: He is alert and oriented to person, place, and time  No cranial nerve deficit or sensory deficit  He exhibits normal muscle tone  Skin: Skin is warm and dry  He is not diaphoretic  No erythema  No pallor  Psychiatric: He has a normal mood and affect  His behavior is normal  Judgment and thought content normal    Nursing note and vitals reviewed  Pertinent Laboratory/Diagnostic Studies: The following labs/studies were reviewed please see chart or hospital paperwork for details  Ref Range & Units 12/8/19 0631 12/7/19 0518    Sodium 136 - 145 mmol/L 139  139    Potassium 3 5 - 5 3 mmol/L 4 1  4 1    Chloride 100 - 108 mmol/L 102  102    CO2 21 - 32 mmol/L 30  30    ANION GAP 4 - 13 mmol/L 7  7    BUN 5 - 25 mg/dL 26High   22    Creatinine 0 60 - 1 30 mg/dL 1 03  1 02 CM      Glucose 65 - 140 mg/dL 103  113 CM      Calcium 8 3 - 10 1 mg/dL 8 3  8 3    AST 5 - 45 U/L 96High         ALT 12 - 78 U/L 268High         Alkaline Phosphatase 46 - 116 U/L 52     Total Protein 6 4 - 8 2 g/dL 5 7Low      Albumin 3 5 - 5 0 g/dL 3 0Low      Total Bilirubin 0 20 - 1 00 mg/dL 0 60     eGFR ml/min/1 73sq m 71  72      Ref Range & Units 12/8/19 0631    WBC 4 31 - 10 16 Thousand/uL 9 36    RBC 3 88 - 5 62 Million/uL 4 01    Hemoglobin 12 0 - 17 0 g/dL 12 9    Hematocrit 36 5 - 49 3 % 38 6    MCV 82 - 98 fL 96    MCH 26 8 - 34 3 pg 32 2    MCHC 31 4 - 37 4 g/dL 33 4    RDW 11 6 - 15 1 % 13 3    MPV 8 9 - 12 7 fL 9 4    Platelets 584 - 020 Thousands/uL 123Low     nRBC /100 WBCs 0      - Medication Side Effects: Adverse side effects of medications were reviewed with the patient/guardian today  DOS: 12/11/19  [facility] List: [facility]  BILLING CODE: [id number] [facility] place of service: POS 31 Skilled Care-Part A Coverage  Diagnoses:   Diagnosis ICD-10-CM Associated Orders   1  Chronic respiratory failure with hypoxia (HCC) J96 11    2   Weakness generalized R53 1    3  Ambulatory dysfunction R26 2    4  AAT (alpha-1-antitrypsin) deficiency ([facility]) E88 01    5  Benign prostatic hyperplasia with lower urinary tract symptoms, symptom details unspecified N40 1    6  Essential hypertension I10    7  Gastroesophageal reflux disease with esophagitis K21 0    8  Insomnia, unspecified type G47 00    9   Lung nodules R91 8

## 2019-12-16 ENCOUNTER — NURSING HOME VISIT (OUTPATIENT)
Dept: GERIATRICS | Facility: OTHER | Age: 74
End: 2019-12-16
Payer: MEDICARE

## 2019-12-16 DIAGNOSIS — R13.10 DYSPHAGIA, UNSPECIFIED TYPE: ICD-10-CM

## 2019-12-16 DIAGNOSIS — I10 ESSENTIAL HYPERTENSION: ICD-10-CM

## 2019-12-16 DIAGNOSIS — K21.00 GASTROESOPHAGEAL REFLUX DISEASE WITH ESOPHAGITIS: ICD-10-CM

## 2019-12-16 DIAGNOSIS — N40.1 BENIGN PROSTATIC HYPERPLASIA WITH LOWER URINARY TRACT SYMPTOMS, SYMPTOM DETAILS UNSPECIFIED: Chronic | ICD-10-CM

## 2019-12-16 DIAGNOSIS — K59.09 OTHER CONSTIPATION: ICD-10-CM

## 2019-12-16 DIAGNOSIS — R26.2 AMBULATORY DYSFUNCTION: ICD-10-CM

## 2019-12-16 DIAGNOSIS — E88.01 AAT (ALPHA-1-ANTITRYPSIN) DEFICIENCY (HCC): Primary | ICD-10-CM

## 2019-12-16 DIAGNOSIS — G47.00 INSOMNIA, UNSPECIFIED TYPE: ICD-10-CM

## 2019-12-16 DIAGNOSIS — R91.8 LUNG NODULES: ICD-10-CM

## 2019-12-16 DIAGNOSIS — J96.11 CHRONIC RESPIRATORY FAILURE WITH HYPOXIA (HCC): ICD-10-CM

## 2019-12-16 DIAGNOSIS — G62.9 NEUROPATHY: ICD-10-CM

## 2019-12-16 PROCEDURE — 99316 NF DSCHRG MGMT 30 MIN+: CPT | Performed by: PHYSICIAN ASSISTANT

## 2019-12-16 NOTE — PROGRESS NOTES
[facility]  [address]  [phone] [address] [facility] [address]: [place]    NAME: Devi Flynn  AGE: 75 y o  SEX: male  DATE OF ADMISSION: 12/9/19 DATE OF DISCHARGE:12/17/19 DISCHARGE DISPOSITION: Home     Reason for admission: Patient was admitted from [address] [place] [address] for rehabilitation after hospitalization for acute on chronic respiratory failure  Admission Diagnoses: chronic respiratory failure, weakness, ambulatory dysfunction, AAT, BPH, HTN, GERD, insomnia, lung nodules  Discharge Diagnoses:   AAT (alpha-1-antitrypsin) deficiency ([facility] [address] )  Pt to continue following with pulmonology  Continue budesonide, formoterol, and prednisone taper  Pt is stable on  3L O2  Gastroesophageal reflux disease  Continue pantoprazole 40 mg PO BID  No s/sx at this time  Chronic respiratory failure with hypoxia (HCC)  Pt to continue on 3L O2  Pt to continue following with pulmonology  Essential hypertension  BP log reviewed, stable  Pt to continue on amlodipine 10mg PO QD  Continue to monitor with PCP  Neuropathy  Continue gabapentin 300 mg PO TID  Pt notes feeling well on this regimen  BPH (benign prostatic hyperplasia)  Continue finasteride 5 mg PO QD  Continue tamsulosin 0 4 mg PO QD  Continue to follow with PCP  Lung nodules  Follow-up PET scan with pulmonology/ PCP  Insomnia  Continue zolpidem 10mg PO QHS  Risks of zolpidem including increased risks of falls discussed with pt  Pt notes understanding  Pt was sent home with remainder of zolpidem medication from SNF  No new prescription given  Other constipation  Pt notes last "good" BM was 1 week ago but that he had a small BM "the other day " Pt prescribed colace 100 mg PO BID prn for constipation and senna 8 6mg PO 2 tabs QHS prn for constipation  Ambulatory dysfunction  Continue home PT to improve functional status   Continue fall precautions at home, such as removing area rugs and keeping home well-lit  Dysphagia  Continue following with SLP at home  Course of stay: Patient was admitted to [facility] for rehabilitation due to acute on chronic respiratory failure with generalized weakness/ambulatory dysfunction  There were no significant events during his stay at [facility]  The patient participated in PT/OT? SLP  New Medications: None   Medication Changes: None   Discontinued Medications: None   Discharge Medications: See discharge medication list which was reviewed and signed         Current Outpatient Medications:     amLODIPine (NORVASC) 10 mg tablet, TAKE ONE TABLET BY MOUTH ONE TIME DAILY , Disp: 30 tablet, Rfl: 4    budesonide (PULMICORT) 0 5 mg/2 mL nebulizer solution, Take 1 vial (0 5 mg total) by nebulization 2 (two) times a day for 5 days Rinse mouth after use , Disp: 10 vial, Rfl: 0    busPIRone (BUSPAR) 10 mg tablet, Take 1 tablet (10 mg total) by mouth 3 (three) times a day, Disp: 90 tablet, Rfl: 0    finasteride (PROSCAR) 5 mg tablet, Take 5 mg by mouth every morning  , Disp: , Rfl:     formoterol (PERFOROMIST) 20 MCG/2ML nebulizer solution, Take 20 mcg by nebulization 2 (two) times a day, Disp: , Rfl:     gabapentin (NEURONTIN) 300 mg capsule, Take 1 capsule (300 mg total) by mouth 3 (three) times a day, Disp: 90 capsule, Rfl: 3    ipratropium-albuterol (DUO-NEB) 0 5-2 5 mg/3 mL nebulizer solution, Take 1 vial (3 mL total) by nebulization 4 (four) times a day, Disp: 120 vial, Rfl: 6    OXYGEN-HELIUM IN, Inhale 2L at rest- 3L with activity, Disp: , Rfl:     pantoprazole (PROTONIX) 40 mg tablet, Take 1 tablet (40 mg total) by mouth 2 (two) times a day, Disp: 60 tablet, Rfl: 5    predniSONE 10 mg tablet, Take 4 tablets daily for 4 days then 3 tablets daily for 4 days then 2 tablets daily for 4 days then 1 tablet daily for 4 days, Disp: 40 tablet, Rfl: 0    tamsulosin (FLOMAX) 0 4 mg, TAKE ONE CAPSULE BY MOUTH ONE TIME DAILY , Disp: 30 capsule, Rfl: 2    zolpidem (AMBIEN) 10 mg tablet, TAKE ONE TABLET BY MOUTH NIGHTLY AT BEDTIME , Disp: 30 tablet, Rfl: 5      Status at time of discharge: Stable    Today's Visit: 12/16/20192:01 PM    Subjective:    DEJAN is a 76 yo CM with multiple medical comorbidities including but not limited to chronic respiratory failure, AAT, BPH, HTN, GERD, insomnia, and lung nodules, interviewed and examined in collaboration with nursing for discharge from SNF  Pt denies having any increased pain at this time  Pt notes that his last BM was "a couple days ago but it was just a small one " Pt denies dysuria/ increased urinary frequency  Pt notes that he has been eating well  Pt has been hydrating well  Pt denies increased work of breathing, on 3L O2 at baseline  Pt denies headaches, dizziness, cough, congestion, chest pain, nausea, vomitting, and diarrhea  Pt notes his vision has progressively been getting worse but denies eye redness/eye pain  Pt notes lightheadedness when he get up too fast  Pt notes some rectal pain  He states this has happened to him in the past and that the pain comes and goes  No concerns from nursing at this time  Vitals: [id number] 4F-96% on 3L O2    Exam: Physical Exam   Constitutional: No distress  Chronically-ill appearing male, lying comfortably in bed   HENT:   Head: Normocephalic and atraumatic  Nose: Nose normal    Mouth/Throat: Oropharynx is clear and moist  No oropharyngeal exudate  Eyes: Pupils are equal, round, and reactive to light  Conjunctivae and EOM are normal  Right eye exhibits no discharge  Left eye exhibits no discharge  No scleral icterus  Pulmonary/Chest: No stridor  He has no wheezes  He has no rales  Diminished breath sounds diffusely    Pt requires 3L O2  Abdominal: Soft  Bowel sounds are normal  He exhibits distension (slight, bloating)  There is no tenderness  There is no rebound and no guarding  Musculoskeletal: Normal range of motion  He exhibits no edema or tenderness  Neurological: He is alert  Skin: He is not diaphoretic  Psychiatric:   Pleasant and cooperative during exam   Nursing note and vitals reviewed  Discussion with patient/family and further instructions:  -Fall precautions  -Aspiration precautions  -Monitor for signs/symptoms of infection  -Medication list was reviewed and signed      Follow-up Recommendations: Please follow-up with your primary care physician within 7-10 days of discharge to review medication changes and current status  Billing based on time     Time spent on unit: 40 minutes  Time spent counseling patient on debility/condition: 30 minutes    Rg Billings PA-C  12/16/20192:01 PM

## 2019-12-23 ENCOUNTER — OFFICE VISIT (OUTPATIENT)
Dept: FAMILY MEDICINE CLINIC | Facility: CLINIC | Age: 74
End: 2019-12-23
Payer: MEDICARE

## 2019-12-23 VITALS
OXYGEN SATURATION: 97 % | WEIGHT: 197 LBS | HEART RATE: 86 BPM | TEMPERATURE: 97.6 F | BODY MASS INDEX: 23.36 KG/M2 | SYSTOLIC BLOOD PRESSURE: 144 MMHG | DIASTOLIC BLOOD PRESSURE: 74 MMHG

## 2019-12-23 DIAGNOSIS — E88.01 AAT (ALPHA-1-ANTITRYPSIN) DEFICIENCY (HCC): ICD-10-CM

## 2019-12-23 DIAGNOSIS — Z76.89 ENCOUNTER FOR SUPPORT AND COORDINATION OF TRANSITION OF CARE: Primary | ICD-10-CM

## 2019-12-23 DIAGNOSIS — K21.9 GASTROESOPHAGEAL REFLUX DISEASE, ESOPHAGITIS PRESENCE NOT SPECIFIED: ICD-10-CM

## 2019-12-23 PROBLEM — G62.9 PERIPHERAL NEUROPATHY: Status: ACTIVE | Noted: 2017-11-09

## 2019-12-23 PROCEDURE — 99214 OFFICE O/P EST MOD 30 MIN: CPT | Performed by: FAMILY MEDICINE

## 2019-12-23 RX ORDER — FAMOTIDINE 20 MG/1
20 TABLET, FILM COATED ORAL 2 TIMES DAILY
Qty: 60 TABLET | Refills: 3 | Status: SHIPPED | OUTPATIENT
Start: 2019-12-23 | End: 2020-02-13 | Stop reason: ALTCHOICE

## 2019-12-23 NOTE — PROGRESS NOTES
Assessment/Plan:     Diagnoses and all orders for this visit:    Encounter for support and coordination of transition of care  Patient will fu with Dr Marva Alvares PCP next month  He will fu with Dr Coby Treadwell for further evaluation of enlarging pulmonary nodules seen on CTA PE chest     AAT (alpha-1-antitrypsin) deficiency (HCC)  As above    Gastroesophageal reflux disease, esophagitis presence not specified  -     famotidine (PEPCID) 20 mg tablet; Take 1 tablet (20 mg total) by mouth 2 (two) times a day  Patient states that protonix is not helping him even though he has been taking it as instructed  Instructed patient to stop taking protonix and start famotidine  Will fu with Dr Marva Alvares his PCP next month  Other orders  -     VENTOLIN  (90 Base) MCG/ACT inhaler        Subjective:      Patient ID: Ryan Najera is a 75 y o  male who was hospitalized at [facility] from 12/9 to 12/13 due to decompensation of his chronic emphysema 2/2 AAT  Patient was released to [facility] on a prednisone taper, which he has been taking  He states that he is feeling much better today, although he is still weak  He is still using 3 L NC O2 day and night  He will fu with Dr Coby Treadwell in 3 week for evaluation of advancing pulmonary nodules seen on CTA chest during hospitalization  HPI    The following portions of the patient's history were reviewed and updated as appropriate: allergies, current medications, past family history, past medical history, past social history, past surgical history and problem list     Review of Systems   Constitutional: Positive for activity change  Negative for chills, diaphoresis, fatigue and fever  HENT: Negative for sinus pain, sneezing and sore throat  Respiratory: Positive for shortness of breath and wheezing  Negative for cough and chest tightness  Cardiovascular: Negative for chest pain, palpitations and leg swelling     Gastrointestinal: Negative for abdominal distention and abdominal pain  Genitourinary: Negative for dysuria and flank pain  Musculoskeletal: Negative for back pain  Neurological: Negative for tremors  Objective:      /74 (BP Location: Left arm, Patient Position: Sitting, Cuff Size: Adult)   Pulse 86   Temp 97 6 °F (36 4 °C) (Tympanic)   Wt 89 4 kg (197 lb)   SpO2 97%   BMI 23 36 kg/m²          Physical Exam   Constitutional: He appears well-developed and well-nourished  HENT:   Head: Normocephalic and atraumatic  Cardiovascular: Normal rate and regular rhythm  Pulmonary/Chest: Effort normal  He has wheezes  Abdominal: Soft  Bowel sounds are normal    Vitals reviewed

## 2019-12-23 NOTE — PATIENT INSTRUCTIONS
GERD (Gastroesophageal Reflux Disease) is caused by reflux of stomach acid into the esophagus (food tube)  It is a common condition and often occurs with general indigestion  Common causes include the Western lifestyle, especially the following:   Eating a Western diet full of processed high calorie to nutrient ratio food  About 80% of adults have an inadequate diet, so this is a foundational change required for most patients suffering with GERD  Eating quickly in stressful environments ('gobble, gulp & go')  Obesity  High stress, poor posture  Certain medicines and substances can aggravate GERD      How to treat GERD: Most GERD symptoms such as heartburn, regurgitation, and hoarseness can be adequately treated with the following:    Healthy eating:  Eat a plant based, whole foods diet  Avoid processed food, including any food product made from typical commercial flour products (e g  Commercial breads, pastries)  Take a probiotic containing food daily, e g  Kefir, yogurt    If overweight, losing weight often improves symptoms    Avoid any food that aggravates your symptoms  These commonly include: coffee, smoking, alcohol, dairy, citrus (e g  orange juice), tomatoes, spicy foods or mint    Elevate head of the bed at night about 6 inches by putting blocks under wear the bed posts at the head of the bed meet the floor  Avoid eating within 2 hours of bedtime    Eat slowly and thoroughly chew your food - saliva neutralizes the effects of stomach acid on irritating the esophagus  Chewing gum after a meal enhances saliva production  Consider a trial of the elimination diet if you suspect a food allergy/intolerance        Musculoskeletal aspects of care:  Good posture is important to decrease tendency to reflux  Don't hunch over  Breath in a relaxed manner deep in the belly, rather than high in the chest  Somatics materials: [email or web address]  [email or web address] html: Somatic Exercises[TM] narrated by Philip Colon, Venkat D : Somatic Exercises[TM ]for Healthy Breathing (CDs)    Avoid tight binders around the abdomen (e g  Corsets in women), which will increase abdominal pressure and therefore reflux  Sleep on your left side (this prevents stomach contents from refluxing into the esophagus)    Re-train the muscles involved in swallowing as well as strengthen the diaphragm - key in reducing any hiatal hernia  Abnormal rhythm (peristalsis) of the muscles in the food tube occur in about 1/2 of GERD patients  Can use a commercial device such as SensorTechoro (IQoro com)  Expect it to take several weeks or months for improvement  This may also help snoring and obstructive sleep apnea  The device does have some scientific evidence supporting its use, but all by one researcher with commercial connection to the product  It is fairly expensive, and it is unlikely that insurance companies will cover it  Supplements     Consider Slippery elm after a meal - can get tabs (e g  Fernando's slippery elm lozenges) or powder - mix with a little water to make a gruel (can add cinnamon for taste) and take after meals     Consider Marshmallow - use similarly to slippery elm    Consider Deglycyrrhizinated Licorice (DGL) 397-110KM chewable tabs before meals x 4-6 weeks    Conventional Medicines  Alginate/antacid combos can keep acid away from the lower esophagus  A good brand is Gaviscon Double Action liquid, taken after meals  Simple over the counter (OTC) antacids such as Calcium Carbonate (e g  Tums) may suffice     H2 blockers decrease acid in the stomach (e g  Ranitidine - Zantac, Famotidine - Pepcid, and others)     Proton Pump blockers (PPIs) are stronger acid-suppressors than H2 blockers  Examples are Omeprazole, Pantoprazole, Esomeprazole - Nexium, Rabeprazole - Aciphex and others)   They may be needed for a short time to get control of symptoms and are approved for up to 8 weeks for GERD (up to 16 weeks or even longer for more severe symptoms)  Because of numerous concerns about possible long term side effects, such as magnesium deficiency, increase of some fractures, pneumonia and opportunistic GI infections such as Clostridia difficile, the risk/benefit ratio of long term use should be carefully evaluated  If needed, your health care provider can guide you how to taper off these medicines slowly (do not stop abruptly, because you might get rebound symptoms)     ]

## 2019-12-29 PROBLEM — J44.1 COPD EXACERBATION (HCC): Status: ACTIVE | Noted: 2019-12-29

## 2019-12-30 ENCOUNTER — TELEPHONE (OUTPATIENT)
Dept: FAMILY MEDICINE CLINIC | Facility: CLINIC | Age: 74
End: 2019-12-30

## 2020-01-06 ENCOUNTER — TELEPHONE (OUTPATIENT)
Dept: FAMILY MEDICINE CLINIC | Facility: CLINIC | Age: 75
End: 2020-01-06

## 2020-01-07 ENCOUNTER — OFFICE VISIT (OUTPATIENT)
Dept: FAMILY MEDICINE CLINIC | Facility: CLINIC | Age: 75
End: 2020-01-07
Payer: MEDICARE

## 2020-01-07 ENCOUNTER — TELEPHONE (OUTPATIENT)
Dept: FAMILY MEDICINE CLINIC | Facility: CLINIC | Age: 75
End: 2020-01-07

## 2020-01-07 VITALS
DIASTOLIC BLOOD PRESSURE: 64 MMHG | SYSTOLIC BLOOD PRESSURE: 128 MMHG | OXYGEN SATURATION: 93 % | BODY MASS INDEX: 23.78 KG/M2 | WEIGHT: 200.5 LBS | TEMPERATURE: 97.9 F | HEART RATE: 67 BPM

## 2020-01-07 DIAGNOSIS — J43.2 CENTRILOBULAR EMPHYSEMA (HCC): ICD-10-CM

## 2020-01-07 DIAGNOSIS — K21.9 GASTROESOPHAGEAL REFLUX DISEASE, ESOPHAGITIS PRESENCE NOT SPECIFIED: Primary | ICD-10-CM

## 2020-01-07 DIAGNOSIS — E88.01 AAT (ALPHA-1-ANTITRYPSIN) DEFICIENCY (HCC): ICD-10-CM

## 2020-01-07 PROCEDURE — 99213 OFFICE O/P EST LOW 20 MIN: CPT | Performed by: FAMILY MEDICINE

## 2020-01-07 RX ORDER — ALPHA-1-PROTEINASE INHIBITOR HUMAN 1000 MG
KIT INTRAVENOUS WEEKLY
COMMUNITY
Start: 2019-12-26 | End: 2022-07-15

## 2020-01-07 NOTE — TELEPHONE ENCOUNTER
DR Gris Dai - PT SEEN TODAY  HE WENT TO PHARMACY TO  SOMETHING FOR HIS NAUSEA, BUT THERE IS NOTHING THERE  HE SAID YOU WERE GOING TO SEND SOMETHING TO THEM  CAN YOU PLEASE DO IT   PT IS THERE NOW

## 2020-01-07 NOTE — PROGRESS NOTES
Assessment/Plan:    No problem-specific Assessment & Plan notes found for this encounter  Diagnoses and all orders for this visit:    Gastroesophageal reflux disease, esophagitis presence not specified  Comments:  Patient nauseous right now will give trial of Zofran  AAT (alpha-1-antitrypsin) deficiency (HCC)  Comments:  Stable  No changes recommended    Centrilobular emphysema (HCC)  Comments:  Stable with no current exacerbation          Subjective:      Patient ID: Sasha Macdonald is a 75 y o  male  HPI patient presents for follow-up of medical problems including COPD and alpha-1 antitrypsin deficiency  However he has been suffering with nausea the last few days but no vomiting  He does feel that he has a decreased appetite as well  There is no change in consistency in his bowels  He often has loose bowel movements  Denies any melena or blood per rectum  He has no urinary symptoms  He denies fever, sweats or chills  He has no chest pain or palpitations  His baseline dyspnea is is present but unchanged  The following portions of the patient's history were reviewed and updated as appropriate: allergies, current medications, past family history, past medical history, past social history, past surgical history and problem list     Review of Systems  denies abdominal pain  No cough or congestion  No ear or throat pain  Minimal edema  No neurologic changes  Objective:      /64 (BP Location: Left arm, Patient Position: Sitting, Cuff Size: Adult)   Pulse 67   Temp 97 9 °F (36 6 °C) (Tympanic)   Wt 90 9 kg (200 lb 8 oz)   SpO2 93%   BMI 23 78 kg/m²          Physical Exam  general cooperative in no acute distress  Oxygen in place  HEENT within normal limits  Neck is supple without JVD  Lungs with decreased breath sounds bilaterally but no active wheezing  Heart regular with no S3 or 4  Abdomen soft and nondistended  Nontender  No organomegaly  No CVA tenderness    Had trace lower extremity edema    Neuro is nonfocal

## 2020-01-08 ENCOUNTER — TELEPHONE (OUTPATIENT)
Dept: OTHER | Facility: OTHER | Age: 75
End: 2020-01-08

## 2020-01-08 NOTE — TELEPHONE ENCOUNTER
[id number]:FLOR [facility] Pager with the following message:  [phone] / Brodie Smallwood /  Romie Steward 1945 / Requesting Nausea RX

## 2020-01-09 ENCOUNTER — TELEPHONE (OUTPATIENT)
Dept: FAMILY MEDICINE CLINIC | Facility: CLINIC | Age: 75
End: 2020-01-09

## 2020-01-09 DIAGNOSIS — K21.9 GASTROESOPHAGEAL REFLUX DISEASE, ESOPHAGITIS PRESENCE NOT SPECIFIED: Primary | ICD-10-CM

## 2020-01-09 RX ORDER — ONDANSETRON 4 MG/1
4 TABLET, FILM COATED ORAL EVERY 8 HOURS PRN
Qty: 20 TABLET | Refills: 0 | Status: SHIPPED | OUTPATIENT
Start: 2020-01-09 | End: 2020-08-21 | Stop reason: ALTCHOICE

## 2020-01-09 NOTE — TELEPHONE ENCOUNTER
Patient is in the office concerned as to  why his meds have not been called in yet  He also needs a form filled out  He needs meds for nausea - he has not been able to eat in over a week  Please call him at [phone] once nausea meds are filled  I have dropped form off in [facility] and mad a copy  Thank you

## 2020-01-10 ENCOUNTER — TELEPHONE (OUTPATIENT)
Dept: FAMILY MEDICINE CLINIC | Facility: CLINIC | Age: 75
End: 2020-01-10

## 2020-01-13 ENCOUNTER — OFFICE VISIT (OUTPATIENT)
Dept: PULMONOLOGY | Facility: MEDICAL CENTER | Age: 75
End: 2020-01-13
Payer: MEDICARE

## 2020-01-13 VITALS
OXYGEN SATURATION: 96 % | TEMPERATURE: 98.6 F | WEIGHT: 204 LBS | SYSTOLIC BLOOD PRESSURE: 130 MMHG | BODY MASS INDEX: 26.18 KG/M2 | HEART RATE: 96 BPM | HEIGHT: 74 IN | DIASTOLIC BLOOD PRESSURE: 62 MMHG | RESPIRATION RATE: 16 BRPM

## 2020-01-13 DIAGNOSIS — K21.9 GASTROESOPHAGEAL REFLUX DISEASE, ESOPHAGITIS PRESENCE NOT SPECIFIED: ICD-10-CM

## 2020-01-13 DIAGNOSIS — J43.2 CENTRILOBULAR EMPHYSEMA (HCC): ICD-10-CM

## 2020-01-13 DIAGNOSIS — E88.01 AAT (ALPHA-1-ANTITRYPSIN) DEFICIENCY (HCC): ICD-10-CM

## 2020-01-13 DIAGNOSIS — R91.8 LUNG NODULES: Primary | ICD-10-CM

## 2020-01-13 DIAGNOSIS — J96.11 CHRONIC RESPIRATORY FAILURE WITH HYPOXIA (HCC): ICD-10-CM

## 2020-01-13 DIAGNOSIS — R60.0 BILATERAL LEG EDEMA: ICD-10-CM

## 2020-01-13 DIAGNOSIS — R09.02 HYPOXEMIA: ICD-10-CM

## 2020-01-13 PROCEDURE — 99214 OFFICE O/P EST MOD 30 MIN: CPT | Performed by: INTERNAL MEDICINE

## 2020-01-13 RX ORDER — TRIAMTERENE AND HYDROCHLOROTHIAZIDE 37.5; 25 MG/1; MG/1
1 CAPSULE ORAL DAILY PRN
Qty: 20 CAPSULE | Refills: 0 | Status: SHIPPED | OUTPATIENT
Start: 2020-01-13 | End: 2020-03-23

## 2020-01-13 NOTE — ASSESSMENT & PLAN NOTE
Camilo uses 3 L of oxygen on a continuous basis  With 3 L of pulse dose oxygen with ambulation today lowest O2 saturation was 88%  I will see if I can get him a new battery operated concentrator that is capable 5 liters/minute flow rate and can use 5 L of oxygen pulse dose with ambulation when he leaves the house    At home will stay on 3 L at rest and can increase it to 5 liters/minute with activity    His medical supply company is [employer]

## 2020-01-13 NOTE — ASSESSMENT & PLAN NOTE
He receives weekly intravenous antiprotease  therapy by visiting nurse  He has been receiving this for several years

## 2020-01-13 NOTE — ASSESSMENT & PLAN NOTE
He has been having some increasing leg edema particularly of left lower extremity  I did give her prescription for Dyazide take 1 tablet as needed for leg edema  Will contact our office if he has no improvement with this

## 2020-01-13 NOTE — ASSESSMENT & PLAN NOTE
I did review CTA of chest done December 4, 2019  There is a 7 x 6 x 9 mm irregular left lower lobe lung nodule which appeared bigger than prior CT scan done March 2018 when was 2 mm  He does have 4 mm right upper lobe lung nodule which is smaller than prior CT scan  There also emphysematous changes  I did order PET CT scan to better evaluate this left lower lobe lung nodule for possible malignancy

## 2020-01-13 NOTE — ASSESSMENT & PLAN NOTE
He does get sensation of stomach fullness quickly after eating  Sometimes he states he gets reflux symptoms  He is taking Protonix but still having symptoms  I advised he try over-the-counter times to see if this helps

## 2020-01-13 NOTE — ASSESSMENT & PLAN NOTE
Severe emphysema with FEV1 of 1 78 L or 44% of predicted  He will continue his nebulizer treatment with DuoNeb 4 times a day and Pulmicort 0 5 mg b i d  His exertional shortness of breath remains stable off of prednisone therapy

## 2020-01-13 NOTE — PROGRESS NOTES
Assessment/Plan        Problem List Items Addressed This Visit        Digestive    AAT (alpha-1-antitrypsin) deficiency ([facility] )     He receives weekly intravenous antiprotease  therapy by visiting nurse  He has been receiving this for several years  Gastroesophageal reflux disease     He does get sensation of stomach fullness quickly after eating  Sometimes he states he gets reflux symptoms  He is taking Protonix but still having symptoms  I advised he try over-the-counter times to see if this helps  Respiratory    Chronic respiratory failure with hypoxia (HCC)     Camilo uses 3 L of oxygen on a continuous basis  With 3 L of pulse dose oxygen with ambulation today lowest O2 saturation was 88%  I will see if I can get him a new battery operated concentrator that is capable 5 liters/minute flow rate and can use 5 L of oxygen pulse dose with ambulation when he leaves the house  At home will stay on 3 L at rest and can increase it to 5 liters/minute with activity    His medical supply company is [employer]         Centrilobular emphysema ([facility] )     Severe emphysema with FEV1 of 1 78 L or 44% of predicted  He will continue his nebulizer treatment with DuoNeb 4 times a day and Pulmicort 0 5 mg b i d  His exertional shortness of breath remains stable off of prednisone therapy  Other    Lung nodules - Primary     I did review CTA of chest done December 4, 2019  There is a 7 x 6 x 9 mm irregular left lower lobe lung nodule which appeared bigger than prior CT scan done March 2018 when was 2 mm  He does have 4 mm right upper lobe lung nodule which is smaller than prior CT scan  There also emphysematous changes  I did order PET CT scan to better evaluate this left lower lobe lung nodule for possible malignancy  Relevant Orders    NM PET CT skull base to mid thigh    Bilateral leg edema     He has been having some increasing leg edema particularly of left lower extremity    I did give her prescription for Dyazide take 1 tablet as needed for leg edema  Will contact our office if he has no improvement with this  Relevant Medications    triamterene-hydrochlorothiazide (DYAZIDE) 37 5-25 mg per capsule      Other Visit Diagnoses     Hypoxemia        Relevant Orders    Home Oxygen Portability Evaluation Only            Follow-up and Shortness of Breath (Increasing )      HPI     Jose Saucedo presents for follow-up visit for his severe emphysema and chronic hypoxemic respiratory failure      Patient has been having lower back pain for 2 weeks  He states this pain occurs when he is walking but when he sits it is okay  Pain is not severe Also complains of poor appetite  Complains of epigastric discomfort when he eats sometimes  He also states that he about senior care through his meal he gets sensation and fullness of his stomach which maximum comfortable with feels like he cannot eat anymore  He has been taking Protonix with no improvement  No nausea or vomiting  No weight loss  He has been off prednisone now for some time  Not having any new cough  Jose Saucedo does have COPD and does have alpha 1 antitrypsin level  He is on 3 liters/minute of oxygen continuous  In October 2019 his spirometry showed FEV1 of 1 78 L or 44% of predicted  He does receive anti protease infusion once a week at home by visiting nurse  He did have CT a of his chest done December 4, 2019  There was a 7 x 6 x 9 mm irregular left lower lobe lung nodule  Which radiologist felt may have only been 2 mm back on CT scan done March 2018  He also had a 4 mm right upper lobe lung nodule which was actually smaller than prior CT scan  There are emphysematous changes bilaterally  No evidence of pulmonary embolism  Does have history of venous stasis ulcers of his left lower extremity  Not having an now but does have some increasing swelling of his left leg  Also some mild edema right leg      Past Medical History:   Diagnosis Date    Anesthesia complication     Difficult to wake up    Arthritis     BPH (benign prostatic hyperplasia)     urinary frequency    Cancer (HCC)     basal cell neck, face    COPD (chronic obstructive pulmonary disease) (HCC)     Full dentures     Hiatal hernia     History of methicillin resistant staphylococcus aureus (MRSA)     10/11/2018 MRSA (nares) positive    Hypertension     controlled    Irritable bowel syndrome     Kidney stone     at least 7 episodes    Liver disease     Alpha 1- enzyme deficiency - diagnosed 2002  has been on weekly replacement therapy since then    Pulmonary emphysema ([address] )     1/25/15  FEV1 - 2 45 liters or 59% of predicted    RSV infection 12/2017    Wears glasses     for driving only       Past Surgical History:   Procedure Laterality Date    BACK SURGERY  2008    discectomy    COLONOSCOPY      COLONOSCOPY N/A 3/10/2017    Procedure: Creta Parent;  Surgeon: Dez Schrader MD;  Location: [place] [address] GI LAB; Service:    Nai Ford      removal of kidney stones    ESOPHAGOGASTRODUODENOSCOPY N/A 3/10/2017    Procedure: ESOPHAGOGASTRODUODENOSCOPY (EGD); Surgeon: Dez Schrader MD;  Location: [facility]; Service:     LITHOTRIPSY      HI ESOPHAGOGASTRODUODENOSCOPY TRANSORAL DIAGNOSTIC N/A 11/20/2018    Procedure: ESOPHAGOGASTRODUODENOSCOPY (EGD); Surgeon: Dez Schrader MD;  Location: [facility] GI LAB;   Service: Gastroenterology    TONSILLECTOMY      VEIN LIGATION AND STRIPPING Bilateral     1980's         Current Outpatient Medications:     amLODIPine (NORVASC) 10 mg tablet, TAKE ONE TABLET BY MOUTH ONE TIME DAILY , Disp: 30 tablet, Rfl: 4    budesonide (PULMICORT) 0 5 mg/2 mL nebulizer solution, Take 1 vial (0 5 mg total) by nebulization 2 (two) times a day for 5 days Rinse mouth after use , Disp: 10 vial, Rfl: 0    busPIRone (BUSPAR) 10 mg tablet, Take 1 tablet (10 mg total) by mouth 3 (three) times a day, Disp: 90 tablet, Rfl: 0   famotidine (PEPCID) 20 mg tablet, Take 1 tablet (20 mg total) by mouth 2 (two) times a day, Disp: 60 tablet, Rfl: 3    finasteride (PROSCAR) 5 mg tablet, Take 5 mg by mouth every morning  , Disp: , Rfl:     formoterol (PERFOROMIST) 20 MCG/2ML nebulizer solution, Take 20 mcg by nebulization 2 (two) times a day, Disp: , Rfl:     gabapentin (NEURONTIN) 300 mg capsule, Take 1 capsule (300 mg total) by mouth 3 (three) times a day, Disp: 90 capsule, Rfl: 3    ipratropium-albuterol (DUO-NEB) 0 5-2 5 mg/3 mL nebulizer solution, Take 1 vial (3 mL total) by nebulization 4 (four) times a day, Disp: 120 vial, Rfl: 6    ondansetron (ZOFRAN) 4 mg tablet, Take 1 tablet (4 mg total) by mouth every 8 (eight) hours as needed for nausea or vomiting, Disp: 20 tablet, Rfl: 0    OXYGEN-HELIUM IN, Inhale 2L at rest- 3L with activity, Disp: , Rfl:     pantoprazole (PROTONIX) 40 mg tablet, Take 1 tablet (40 mg total) by mouth 2 (two) times a day, Disp: 60 tablet, Rfl: 5    tamsulosin (FLOMAX) 0 4 mg, TAKE ONE CAPSULE BY MOUTH ONE TIME DAILY , Disp: 30 capsule, Rfl: 2    VENTOLIN  (90 Base) MCG/ACT inhaler, , Disp: , Rfl:     ZEMAIRA infusion, , Disp: , Rfl:     zolpidem (AMBIEN) 10 mg tablet, TAKE ONE TABLET BY MOUTH NIGHTLY AT BEDTIME , Disp: 30 tablet, Rfl: 5    predniSONE 10 mg tablet, Take 4 tablets daily for 4 days then 3 tablets daily for 4 days then 2 tablets daily for 4 days then 1 tablet daily for 4 days (Patient not taking: Reported on 2020), Disp: 40 tablet, Rfl: 0    triamterene-hydrochlorothiazide (DYAZIDE) 37 5-25 mg per capsule, Take 1 capsule by mouth daily as needed (leg swelling), Disp: 20 capsule, Rfl: 0    Allergies   Allergen Reactions    Chantix [Varenicline]      Caused prostate infection       Social History     Tobacco Use    Smoking status: Former Smoker     Packs/day: 1      Years: 60 00     Pack years: 60 00     Last attempt to quit: 2017     Years since quittin 3    Smokeless tobacco: Never Used    Tobacco comment: quit in august 2017   Substance Use Topics    Alcohol use: Not Currently     Comment: stopped in 2009         Family History   Problem Relation Age of Onset    Emphysema Mother         never smoked    Emphysema Father     Cancer Brother         colon    Colon cancer Brother     Ulcerative colitis Family     Liver disease Family        Review of Systems   Constitutional: Negative for activity change, appetite change, fever and unexpected weight change  HENT: Negative for congestion and rhinorrhea  Eyes: Negative for redness  Respiratory:        He does have chronic shortness of breath with activity  No change  Walking 50 feet he gets out of breath   Cardiovascular: Positive for leg swelling  Negative for chest pain  Gastrointestinal:        Complains of stomach fullness after eating only half is meal   Then has some shortness of breath at that point  I really having true abdominal pain  States he does get gastric reflux even though he takes Protonix   Endocrine: Negative for polydipsia and polyphagia  Genitourinary: Negative for dysuria  Musculoskeletal: Negative for joint swelling  Neurological: Negative for light-headedness  Psychiatric/Behavioral: Negative for decreased concentration  Vitals:    01/13/20 0818   BP: 130/62   Pulse: 96   Resp: 16   Temp: 98 6 °F (37 °C)   SpO2: 96%           Physical Exam   Constitutional: He is oriented to person, place, and time  He appears well-developed and well-nourished  No distress  HENT:   Head: Normocephalic  Nose: Nose normal    Mouth/Throat: Oropharynx is clear and moist  No oropharyngeal exudate  Eyes: Pupils are equal, round, and reactive to light  Conjunctivae are normal    Neck: Neck supple  No JVD present  Cardiovascular: Normal rate, regular rhythm and normal heart sounds  Pulmonary/Chest: Effort normal    Lung sounds are clear    No wheezes, crackles or rhonchi Abdominal: Soft  He exhibits no distension  There is no tenderness  Musculoskeletal:   Has +2 to 3 edema of left lower extremity +1 edema right lower extremity  Neurological: He is alert and oriented to person, place, and time  Skin: Skin is warm and dry  Psychiatric: He has a normal mood and affect  Pulse oximetry: At rest on 3 L pulsed of oxygen O2 saturation was 92% and ambulating into the patient room on 3 L pulsed lowest O2 saturation was 88%  On 3 liters/minute continuous oxygen at rest O2 saturation was 96%    Period on 5 L of oxygen with activity O2 saturation lowest was 95%

## 2020-01-15 NOTE — TELEPHONE ENCOUNTER
Isabela([facility] )     Asking for some Bentyl for his gas cramping     And refill of zofran - sublingual

## 2020-01-20 ENCOUNTER — OFFICE VISIT (OUTPATIENT)
Dept: FAMILY MEDICINE CLINIC | Facility: CLINIC | Age: 75
End: 2020-01-20
Payer: MEDICARE

## 2020-01-20 VITALS
HEART RATE: 98 BPM | HEIGHT: 74 IN | BODY MASS INDEX: 25.03 KG/M2 | DIASTOLIC BLOOD PRESSURE: 60 MMHG | SYSTOLIC BLOOD PRESSURE: 118 MMHG | OXYGEN SATURATION: 94 % | TEMPERATURE: 97.7 F | WEIGHT: 195 LBS

## 2020-01-20 DIAGNOSIS — I27.82 OTHER CHRONIC PULMONARY EMBOLISM WITH ACUTE COR PULMONALE (HCC): ICD-10-CM

## 2020-01-20 DIAGNOSIS — I26.09 OTHER CHRONIC PULMONARY EMBOLISM WITH ACUTE COR PULMONALE (HCC): ICD-10-CM

## 2020-01-20 DIAGNOSIS — K21.00 GASTROESOPHAGEAL REFLUX DISEASE WITH ESOPHAGITIS: Primary | ICD-10-CM

## 2020-01-20 DIAGNOSIS — J44.9 MODERATE COPD (CHRONIC OBSTRUCTIVE PULMONARY DISEASE) (HCC): ICD-10-CM

## 2020-01-20 DIAGNOSIS — I83.029 VENOUS ULCER OF LEFT LOWER EXTREMITY WITH VARICOSE VEINS (HCC): ICD-10-CM

## 2020-01-20 DIAGNOSIS — D69.6 THROMBOCYTOPENIA (HCC): ICD-10-CM

## 2020-01-20 DIAGNOSIS — L97.929 VENOUS ULCER OF LEFT LOWER EXTREMITY WITH VARICOSE VEINS (HCC): ICD-10-CM

## 2020-01-20 DIAGNOSIS — E88.01 AAT (ALPHA-1-ANTITRYPSIN) DEFICIENCY (HCC): ICD-10-CM

## 2020-01-20 PROCEDURE — 99213 OFFICE O/P EST LOW 20 MIN: CPT | Performed by: FAMILY MEDICINE

## 2020-01-20 RX ORDER — METOCLOPRAMIDE 10 MG/1
10 TABLET ORAL
Qty: 60 TABLET | Refills: 1 | Status: SHIPPED | OUTPATIENT
Start: 2020-01-20 | End: 2020-04-10

## 2020-01-20 NOTE — PROGRESS NOTES
Assessment/Plan:    No problem-specific Assessment & Plan notes found for this encounter  Diagnoses and all orders for this visit:    Gastroesophageal reflux disease with esophagitis  Comments:  Patient instructed on nutritional supplements with recommendation of 2 to 3 times a day  Trial of Reglan started  Referral to gastroenterology  Orders:  -     Ambulatory referral to Gastroenterology; Future  -     metoclopramide (REGLAN) 10 mg tablet; Take 1 tablet (10 mg total) by mouth 2 (two) times a day before meals    Moderate COPD (chronic obstructive pulmonary disease) ([facility] )  Comments:  Patient to continue current treatment and follow-up with pulmonology  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 ([facility] )  Comments:  Patient follows up with wound management  Other chronic pulmonary embolism with acute cor pulmonale (HCC)    Thrombocytopenia (HCC)    AAT (alpha-1-antitrypsin) deficiency (HCC)          Subjective:      Patient ID: Olivia Urbina is a 75 y o  male  HPI patient presents for follow-up of medical problems  He continues to struggle eating with early satiety  He does have some nausea but no vomiting  As a result he has had some mild weight loss  He tried a nutritional supplement but did not like the taste  He has no change in his bowel habits which consist of occasional loose stools and occasional formed stools  He has no constipation  He denies melena or blood per rectum  He denies dysuria or hematuria  His baseline dyspnea is present and unchanged  He has no palpitations or chest pain  He denies syncope or lightheadedness  The following portions of the patient's history were reviewed and updated as appropriate: allergies, current medications, past family history, past medical history, past social history, past surgical history and problem list     Review of Systems  denies fever, sweats or chills  Has no cough or URI symptoms    Has mild lower extremity edema which is unchanged  Does get paresthesias of the hands and feet which are no different  Other ROS per HPI    Objective:      /60   Pulse 98   Temp 97 7 °F (36 5 °C)   Ht 6' 2" (1 88 m)   Wt 88 5 kg (195 lb)   SpO2 94%   BMI 25 04 kg/m²          Physical Exam  general cooperative in no acute distress  HEENT within normal limits  Neck is supple without JVD  Lungs with decreased air entry but moderate air movement no active wheezing  Heart regular with no S3 or 4  Abdomen BS positive, soft no masses organomegaly palpated  No rebound or guarding  Minimal epigastric discomfort  Lower extremities with 1 to 2+ edema  Cranial nerves grossly intact

## 2020-01-24 ENCOUNTER — TELEPHONE (OUTPATIENT)
Dept: PULMONOLOGY | Facility: MEDICAL CENTER | Age: 75
End: 2020-01-24

## 2020-01-24 ENCOUNTER — HOSPITAL ENCOUNTER (OUTPATIENT)
Dept: RADIOLOGY | Age: 75
Discharge: HOME | End: 2020-01-24
Payer: MEDICARE

## 2020-01-24 DIAGNOSIS — D38.1 NEOPLASM OF UNCERTAIN BEHAVIOR OF LEFT LOWER LOBE OF LUNG: ICD-10-CM

## 2020-01-24 LAB — GLUCOSE SERPL-MCNC: 114 MG/DL (ref 65–140)

## 2020-01-24 PROCEDURE — A9552 F18 FDG: HCPCS

## 2020-01-24 PROCEDURE — 78815 PET IMAGE W/CT SKULL-THIGH: CPT

## 2020-01-24 PROCEDURE — 82948 REAGENT STRIP/BLOOD GLUCOSE: CPT

## 2020-01-24 NOTE — TELEPHONE ENCOUNTER
Radiology called to let us know there were significant findings on patients petCt     I called and spoke with Dr Ames Lundborg to have him look at the results

## 2020-01-26 ENCOUNTER — HOSPITAL ENCOUNTER (INPATIENT)
Facility: HOSPITAL | Age: 75
LOS: 4 days | Discharge: NURSING, REHAB OR LONG-TERM CARE | DRG: 191 | End: 2020-01-30
Attending: EMERGENCY MEDICINE | Admitting: FAMILY MEDICINE
Payer: MEDICARE

## 2020-01-26 ENCOUNTER — APPOINTMENT (EMERGENCY)
Dept: RADIOLOGY | Facility: HOSPITAL | Age: 75
DRG: 191 | End: 2020-01-26
Payer: MEDICARE

## 2020-01-26 DIAGNOSIS — E88.01 AAT (ALPHA-1-ANTITRYPSIN) DEFICIENCY (HCC): ICD-10-CM

## 2020-01-26 DIAGNOSIS — R94.8 ABNORMAL PET SCAN OF COLON: ICD-10-CM

## 2020-01-26 DIAGNOSIS — K52.9 CHRONIC DIARRHEA OF UNKNOWN ORIGIN: ICD-10-CM

## 2020-01-26 DIAGNOSIS — J44.1 COPD WITH ACUTE EXACERBATION (HCC): Primary | ICD-10-CM

## 2020-01-26 DIAGNOSIS — K21.9 GASTROESOPHAGEAL REFLUX DISEASE, ESOPHAGITIS PRESENCE NOT SPECIFIED: ICD-10-CM

## 2020-01-26 LAB
ALBUMIN SERPL BCP-MCNC: 3.9 G/DL (ref 3.5–5)
ALP SERPL-CCNC: 68 U/L (ref 46–116)
ALT SERPL W P-5'-P-CCNC: 40 U/L (ref 12–78)
ANION GAP SERPL CALCULATED.3IONS-SCNC: 8 MMOL/L (ref 4–13)
APTT PPP: 23 SECONDS (ref 23–37)
AST SERPL W P-5'-P-CCNC: 30 U/L (ref 5–45)
BASOPHILS # BLD AUTO: 0.07 THOUSANDS/ΜL (ref 0–0.1)
BASOPHILS NFR BLD AUTO: 1 % (ref 0–1)
BILIRUB SERPL-MCNC: 1.2 MG/DL (ref 0.2–1)
BUN SERPL-MCNC: 17 MG/DL (ref 5–25)
CALCIUM SERPL-MCNC: 10.2 MG/DL (ref 8.3–10.1)
CHLORIDE SERPL-SCNC: 101 MMOL/L (ref 100–108)
CO2 SERPL-SCNC: 31 MMOL/L (ref 21–32)
CREAT SERPL-MCNC: 1.87 MG/DL (ref 0.6–1.3)
EOSINOPHIL # BLD AUTO: 0.23 THOUSAND/ΜL (ref 0–0.61)
EOSINOPHIL NFR BLD AUTO: 3 % (ref 0–6)
ERYTHROCYTE [DISTWIDTH] IN BLOOD BY AUTOMATED COUNT: 14.6 % (ref 11.6–15.1)
GFR SERPL CREATININE-BSD FRML MDRD: 34 ML/MIN/1.73SQ M
GLUCOSE SERPL-MCNC: 111 MG/DL (ref 65–140)
HCT VFR BLD AUTO: 38.4 % (ref 36.5–49.3)
HGB BLD-MCNC: 13.4 G/DL (ref 12–17)
IMM GRANULOCYTES # BLD AUTO: 0.04 THOUSAND/UL (ref 0–0.2)
IMM GRANULOCYTES NFR BLD AUTO: 1 % (ref 0–2)
INR PPP: 1.01 (ref 0.91–1.09)
LYMPHOCYTES # BLD AUTO: 1.97 THOUSANDS/ΜL (ref 0.6–4.47)
LYMPHOCYTES NFR BLD AUTO: 29 % (ref 14–44)
MAGNESIUM SERPL-MCNC: 2 MG/DL (ref 1.6–2.6)
MCH RBC QN AUTO: 33.2 PG (ref 26.8–34.3)
MCHC RBC AUTO-ENTMCNC: 34.9 G/DL (ref 31.4–37.4)
MCV RBC AUTO: 95 FL (ref 82–98)
MONOCYTES # BLD AUTO: 0.78 THOUSAND/ΜL (ref 0.17–1.22)
MONOCYTES NFR BLD AUTO: 12 % (ref 4–12)
NEUTROPHILS # BLD AUTO: 3.71 THOUSANDS/ΜL (ref 1.85–7.62)
NEUTS SEG NFR BLD AUTO: 54 % (ref 43–75)
NRBC BLD AUTO-RTO: 0 /100 WBCS
NT-PROBNP SERPL-MCNC: 120 PG/ML
PLATELET # BLD AUTO: 200 THOUSANDS/UL (ref 149–390)
PMV BLD AUTO: 9.1 FL (ref 8.9–12.7)
POTASSIUM SERPL-SCNC: 3.6 MMOL/L (ref 3.5–5.3)
PROT SERPL-MCNC: 7 G/DL (ref 6.4–8.2)
PROTHROMBIN TIME: 10.9 SECONDS (ref 9.8–12)
RBC # BLD AUTO: 4.04 MILLION/UL (ref 3.88–5.62)
SODIUM SERPL-SCNC: 140 MMOL/L (ref 136–145)
TROPONIN I SERPL-MCNC: 0.02 NG/ML
WBC # BLD AUTO: 6.8 THOUSAND/UL (ref 4.31–10.16)

## 2020-01-26 PROCEDURE — 85730 THROMBOPLASTIN TIME PARTIAL: CPT | Performed by: EMERGENCY MEDICINE

## 2020-01-26 PROCEDURE — 85025 COMPLETE CBC W/AUTO DIFF WBC: CPT | Performed by: EMERGENCY MEDICINE

## 2020-01-26 PROCEDURE — 71045 X-RAY EXAM CHEST 1 VIEW: CPT

## 2020-01-26 PROCEDURE — 84484 ASSAY OF TROPONIN QUANT: CPT | Performed by: EMERGENCY MEDICINE

## 2020-01-26 PROCEDURE — 94760 N-INVAS EAR/PLS OXIMETRY 1: CPT

## 2020-01-26 PROCEDURE — 99285 EMERGENCY DEPT VISIT HI MDM: CPT

## 2020-01-26 PROCEDURE — 1124F ACP DISCUSS-NO DSCNMKR DOCD: CPT | Performed by: PHYSICIAN ASSISTANT

## 2020-01-26 PROCEDURE — 96374 THER/PROPH/DIAG INJ IV PUSH: CPT

## 2020-01-26 PROCEDURE — 36415 COLL VENOUS BLD VENIPUNCTURE: CPT | Performed by: EMERGENCY MEDICINE

## 2020-01-26 PROCEDURE — 83880 ASSAY OF NATRIURETIC PEPTIDE: CPT | Performed by: EMERGENCY MEDICINE

## 2020-01-26 PROCEDURE — 85610 PROTHROMBIN TIME: CPT | Performed by: EMERGENCY MEDICINE

## 2020-01-26 PROCEDURE — 99285 EMERGENCY DEPT VISIT HI MDM: CPT | Performed by: EMERGENCY MEDICINE

## 2020-01-26 PROCEDURE — 80053 COMPREHEN METABOLIC PANEL: CPT | Performed by: EMERGENCY MEDICINE

## 2020-01-26 PROCEDURE — 93005 ELECTROCARDIOGRAM TRACING: CPT

## 2020-01-26 PROCEDURE — 94640 AIRWAY INHALATION TREATMENT: CPT

## 2020-01-26 PROCEDURE — 83735 ASSAY OF MAGNESIUM: CPT | Performed by: EMERGENCY MEDICINE

## 2020-01-26 RX ORDER — AMLODIPINE BESYLATE 10 MG/1
10 TABLET ORAL DAILY
Status: DISCONTINUED | OUTPATIENT
Start: 2020-01-27 | End: 2020-01-30 | Stop reason: HOSPADM

## 2020-01-26 RX ORDER — GABAPENTIN 300 MG/1
300 CAPSULE ORAL 3 TIMES DAILY
Status: DISCONTINUED | OUTPATIENT
Start: 2020-01-26 | End: 2020-01-30 | Stop reason: HOSPADM

## 2020-01-26 RX ORDER — PANTOPRAZOLE SODIUM 40 MG/1
40 TABLET, DELAYED RELEASE ORAL 2 TIMES DAILY
Status: DISCONTINUED | OUTPATIENT
Start: 2020-01-26 | End: 2020-01-30 | Stop reason: HOSPADM

## 2020-01-26 RX ORDER — FAMOTIDINE 20 MG/1
20 TABLET, FILM COATED ORAL 2 TIMES DAILY
Status: DISCONTINUED | OUTPATIENT
Start: 2020-01-26 | End: 2020-01-30 | Stop reason: HOSPADM

## 2020-01-26 RX ORDER — IPRATROPIUM BROMIDE AND ALBUTEROL SULFATE 2.5; .5 MG/3ML; MG/3ML
3 SOLUTION RESPIRATORY (INHALATION)
Status: DISCONTINUED | OUTPATIENT
Start: 2020-01-26 | End: 2020-01-30 | Stop reason: HOSPADM

## 2020-01-26 RX ORDER — FORMOTEROL FUMARATE 20 UG/2ML
20 SOLUTION RESPIRATORY (INHALATION)
Status: DISCONTINUED | OUTPATIENT
Start: 2020-01-26 | End: 2020-01-30 | Stop reason: HOSPADM

## 2020-01-26 RX ORDER — IPRATROPIUM BROMIDE AND ALBUTEROL SULFATE 2.5; .5 MG/3ML; MG/3ML
3 SOLUTION RESPIRATORY (INHALATION) ONCE
Status: COMPLETED | OUTPATIENT
Start: 2020-01-26 | End: 2020-01-26

## 2020-01-26 RX ORDER — METHYLPREDNISOLONE SODIUM SUCCINATE 125 MG/2ML
60 INJECTION, POWDER, LYOPHILIZED, FOR SOLUTION INTRAMUSCULAR; INTRAVENOUS EVERY 8 HOURS SCHEDULED
Status: DISCONTINUED | OUTPATIENT
Start: 2020-01-26 | End: 2020-01-27

## 2020-01-26 RX ORDER — METOCLOPRAMIDE 10 MG/1
10 TABLET ORAL
Status: DISCONTINUED | OUTPATIENT
Start: 2020-01-26 | End: 2020-01-30 | Stop reason: HOSPADM

## 2020-01-26 RX ORDER — TAMSULOSIN HYDROCHLORIDE 0.4 MG/1
0.4 CAPSULE ORAL DAILY
Status: DISCONTINUED | OUTPATIENT
Start: 2020-01-27 | End: 2020-01-30 | Stop reason: HOSPADM

## 2020-01-26 RX ORDER — ZOLPIDEM TARTRATE 5 MG/1
10 TABLET ORAL
Status: DISCONTINUED | OUTPATIENT
Start: 2020-01-26 | End: 2020-01-28

## 2020-01-26 RX ORDER — FINASTERIDE 5 MG/1
5 TABLET, FILM COATED ORAL EVERY MORNING
Status: DISCONTINUED | OUTPATIENT
Start: 2020-01-27 | End: 2020-01-30 | Stop reason: HOSPADM

## 2020-01-26 RX ORDER — BUSPIRONE HYDROCHLORIDE 10 MG/1
10 TABLET ORAL 3 TIMES DAILY
Status: DISCONTINUED | OUTPATIENT
Start: 2020-01-26 | End: 2020-01-30 | Stop reason: HOSPADM

## 2020-01-26 RX ORDER — TRIAMTERENE AND HYDROCHLOROTHIAZIDE 37.5; 25 MG/1; MG/1
1 TABLET ORAL DAILY
Status: DISCONTINUED | OUTPATIENT
Start: 2020-01-27 | End: 2020-01-26

## 2020-01-26 RX ORDER — BUDESONIDE 0.5 MG/2ML
0.5 INHALANT ORAL
Status: DISCONTINUED | OUTPATIENT
Start: 2020-01-26 | End: 2020-01-30 | Stop reason: HOSPADM

## 2020-01-26 RX ORDER — METHYLPREDNISOLONE SODIUM SUCCINATE 125 MG/2ML
125 INJECTION, POWDER, LYOPHILIZED, FOR SOLUTION INTRAMUSCULAR; INTRAVENOUS ONCE
Status: COMPLETED | OUTPATIENT
Start: 2020-01-26 | End: 2020-01-26

## 2020-01-26 RX ORDER — SODIUM CHLORIDE 9 MG/ML
75 INJECTION, SOLUTION INTRAVENOUS CONTINUOUS
Status: DISCONTINUED | OUTPATIENT
Start: 2020-01-26 | End: 2020-01-29

## 2020-01-26 RX ADMIN — GABAPENTIN 300 MG: 300 CAPSULE ORAL at 21:47

## 2020-01-26 RX ADMIN — FORMOTEROL FUMARATE DIHYDRATE 20 MCG: 20 SOLUTION RESPIRATORY (INHALATION) at 21:09

## 2020-01-26 RX ADMIN — IPRATROPIUM BROMIDE AND ALBUTEROL SULFATE 3 ML: 2.5; .5 SOLUTION RESPIRATORY (INHALATION) at 14:01

## 2020-01-26 RX ADMIN — BUDESONIDE 0.5 MG: 0.5 INHALANT RESPIRATORY (INHALATION) at 20:52

## 2020-01-26 RX ADMIN — METHYLPREDNISOLONE SODIUM SUCCINATE 125 MG: 125 INJECTION, POWDER, FOR SOLUTION INTRAMUSCULAR; INTRAVENOUS at 13:35

## 2020-01-26 RX ADMIN — ENOXAPARIN SODIUM 40 MG: 40 INJECTION SUBCUTANEOUS at 18:14

## 2020-01-26 RX ADMIN — METOCLOPRAMIDE 10 MG: 10 TABLET ORAL at 18:14

## 2020-01-26 RX ADMIN — IPRATROPIUM BROMIDE AND ALBUTEROL SULFATE 3 ML: 2.5; .5 SOLUTION RESPIRATORY (INHALATION) at 13:36

## 2020-01-26 RX ADMIN — PANTOPRAZOLE SODIUM 40 MG: 40 TABLET, DELAYED RELEASE ORAL at 18:14

## 2020-01-26 RX ADMIN — METHYLPREDNISOLONE SODIUM SUCCINATE 60 MG: 125 INJECTION, POWDER, FOR SOLUTION INTRAMUSCULAR; INTRAVENOUS at 21:47

## 2020-01-26 RX ADMIN — SODIUM CHLORIDE 75 ML/HR: 0.9 INJECTION, SOLUTION INTRAVENOUS at 18:15

## 2020-01-26 RX ADMIN — FAMOTIDINE 20 MG: 20 TABLET ORAL at 18:14

## 2020-01-26 RX ADMIN — ZOLPIDEM TARTRATE 10 MG: 5 TABLET, COATED ORAL at 21:47

## 2020-01-26 RX ADMIN — BUSPIRONE HYDROCHLORIDE 10 MG: 10 TABLET ORAL at 21:47

## 2020-01-26 RX ADMIN — IPRATROPIUM BROMIDE AND ALBUTEROL SULFATE 3 ML: 2.5; .5 SOLUTION RESPIRATORY (INHALATION) at 20:52

## 2020-01-26 NOTE — ASSESSMENT & PLAN NOTE
Patient has a long history of stomach upset, heart burn, and diarrhea  He follows with GI outpatient  Per GI notes, he was scheduled to get EGD and colonoscopy in October 2019 but patient canceled the procedures  He complains of heartburn on admission   · Continue home meds: pepcid 20mg BID, protonix 40mg BID, and Reglan BID   · GI consult:  · Patient will need EGD and colonoscopy once he is cleared by pulmonary  · Barium swallow: Mild esophageal dysmotility, Moderate gastroesophageal reflux without evidence for mild reflux esophagitis, Small hiatal hernia     · Colonoscopy 1/29

## 2020-01-26 NOTE — ED PROVIDER NOTES
History  Chief Complaint   Patient presents with    Shortness of Breath     pt c/o worsening sob for 2 days, pt on home 02 at 3 lpm, today pt had to increase his home to 5 lpm    Chest Pain     74 y/o male presents with a history of COPD, presents with dyspnea, wheezing, shortness of breath worse on exertion, started a few days ago getting progressively worse  Patient has increasing chest tightness, denies pain, vomiting, nausea,abdominal pain, diarrhea or any other symptoms  Patient has a lung nodule that is being worked up with most recently had PET scan  Is also very weak and cannot get out of bed  History provided by:  Patient   used: No        Prior to Admission Medications   Prescriptions Last Dose Informant Patient Reported? Taking? OXYGEN-HELIUM IN  Self Yes No   Sig: Inhale 2L at rest- 3L with activity   VENTOLIN  (90 Base) MCG/ACT inhaler  Self Yes No   ZEMAIRA infusion  Self Yes No   amLODIPine (NORVASC) 10 mg tablet  Self No No   Sig: TAKE ONE TABLET BY MOUTH ONE TIME DAILY    budesonide (PULMICORT) 0 5 mg/2 mL nebulizer solution  Self No No   Sig: Take 1 vial (0 5 mg total) by nebulization 2 (two) times a day for 5 days Rinse mouth after use     busPIRone (BUSPAR) 10 mg tablet  Self No No   Sig: Take 1 tablet (10 mg total) by mouth 3 (three) times a day   famotidine (PEPCID) 20 mg tablet  Self No No   Sig: Take 1 tablet (20 mg total) by mouth 2 (two) times a day   finasteride (PROSCAR) 5 mg tablet  Self Yes No   Sig: Take 5 mg by mouth every morning     formoterol (PERFOROMIST) 20 MCG/2ML nebulizer solution  Self Yes No   Sig: Take 20 mcg by nebulization 2 (two) times a day   gabapentin (NEURONTIN) 300 mg capsule  Self No No   Sig: Take 1 capsule (300 mg total) by mouth 3 (three) times a day   ipratropium-albuterol (DUO-NEB) 0 5-2 5 mg/3 mL nebulizer solution  Self No No   Sig: Take 1 vial (3 mL total) by nebulization 4 (four) times a day   metoclopramide (REGLAN) 10 mg tablet   No No   Sig: Take 1 tablet (10 mg total) by mouth 2 (two) times a day before meals   ondansetron (ZOFRAN) 4 mg tablet  Self No No   Sig: Take 1 tablet (4 mg total) by mouth every 8 (eight) hours as needed for nausea or vomiting   pantoprazole (PROTONIX) 40 mg tablet  Self No No   Sig: Take 1 tablet (40 mg total) by mouth 2 (two) times a day   predniSONE 10 mg tablet  Self No No   Sig: Take 4 tablets daily for 4 days then 3 tablets daily for 4 days then 2 tablets daily for 4 days then 1 tablet daily for 4 days   Patient not taking: Reported on 1/13/2020   tamsulosin (FLOMAX) 0 4 mg  Self No No   Sig: TAKE ONE CAPSULE BY MOUTH ONE TIME DAILY    triamterene-hydrochlorothiazide (DYAZIDE) 37 5-25 mg per capsule   No No   Sig: Take 1 capsule by mouth daily as needed (leg swelling)   zolpidem (AMBIEN) 10 mg tablet  Self No No   Sig: TAKE ONE TABLET BY MOUTH NIGHTLY AT BEDTIME       Facility-Administered Medications: None       Past Medical History:   Diagnosis Date    Anesthesia complication     Difficult to wake up    Arthritis     BPH (benign prostatic hyperplasia)     urinary frequency    Cancer (HCC)     basal cell neck, face    COPD (chronic obstructive pulmonary disease) (HCC)     Full dentures     Hiatal hernia     History of methicillin resistant staphylococcus aureus (MRSA)     10/11/2018 MRSA (nares) positive    Hypertension     controlled    Irritable bowel syndrome     Kidney stone     at least 7 episodes    Liver disease     Alpha 1- enzyme deficiency - diagnosed 2002   has been on weekly replacement therapy since then    Pulmonary emphysema ([facility] [address] )     1/25/15  FEV1 - 2 45 liters or 59% of predicted    RSV infection 12/2017    Wears glasses     for driving only       Past Surgical History:   Procedure Laterality Date    BACK SURGERY  2008    discectomy    COLONOSCOPY      COLONOSCOPY N/A 3/10/2017    Procedure: Tracie Catalan;  Surgeon: Tatiana Roman MD;  Location: [facility] GI LAB;  Service:    Lit Amaya      removal of kidney stones    ESOPHAGOGASTRODUODENOSCOPY N/A 3/10/2017    Procedure: ESOPHAGOGASTRODUODENOSCOPY (EGD); Surgeon: Malaika Aburto MD;  Location: [facility] GI LAB; Service:     LITHOTRIPSY      [place] ESOPHAGOGASTRODUODENOSCOPY TRANSORAL DIAGNOSTIC N/A 2018    Procedure: ESOPHAGOGASTRODUODENOSCOPY (EGD); Surgeon: Malaika Aburto MD;  Location: [facility] GI LAB; Service: Gastroenterology    TONSILLECTOMY      VEIN LIGATION AND STRIPPING Bilateral     18's       Family History   Problem Relation Age of Onset    Emphysema Mother         never smoked    Emphysema Father     Cancer Brother         colon    Colon cancer Brother     Ulcerative colitis Family     Liver disease Family      I have reviewed and agree with the history as documented  Social History     Tobacco Use    Smoking status: Former Smoker     Packs/day: 1 00     Years: 60 00     Pack years: 60 00     Last attempt to quit: 2017     Years since quittin 4    Smokeless tobacco: Never Used    Tobacco comment: quit in 2017   Substance Use Topics    Alcohol use: Not Currently     Comment: stopped in     Drug use: No        Review of Systems   All other systems reviewed and are negative  Physical Exam  Physical Exam   Constitutional: He is oriented to person, place, and time  He appears well-developed and well-nourished  He is not intubated  HENT:   Head: Normocephalic and atraumatic  Eyes: Pupils are equal, round, and reactive to light  EOM are normal    Neck: Normal range of motion  Neck supple  Cardiovascular: Normal rate and regular rhythm  Pulmonary/Chest: No accessory muscle usage or stridor  No apnea, no tachypnea and no bradypnea  He is not intubated  He is in respiratory distress  He has decreased breath sounds  He has wheezes  He has no rhonchi  He has no rales  Abdominal: Soft  Bowel sounds are normal    Musculoskeletal: Normal range of motion  Neurological: He is alert and oriented to person, place, and time  Skin: Skin is warm and dry  Psychiatric: He has a normal mood and affect  Nursing note and vitals reviewed        Vital Signs  ED Triage Vitals [01/26/20 1319]   Temperature Pulse Respirations Blood Pressure SpO2   98 1 °F (36 7 °C) 90 20 161/71 90 %      Temp Source Heart Rate Source Patient Position - Orthostatic VS BP Location FiO2 (%)   Tympanic Monitor Lying Left arm --      Pain Score       --           Vitals:    01/26/20 1445 01/26/20 1515 01/26/20 1715 01/26/20 1801   BP: 159/76 155/76 169/91 168/86   Pulse: 84 78 84 95   Patient Position - Orthostatic VS: Lying            Visual Acuity      ED Medications  Medications   amLODIPine (NORVASC) tablet 10 mg (has no administration in time range)   busPIRone (BUSPAR) tablet 10 mg (has no administration in time range)   famotidine (PEPCID) tablet 20 mg (has no administration in time range)   tamsulosin (FLOMAX) capsule 0 4 mg (has no administration in time range)   budesonide (PULMICORT) inhalation solution 0 5 mg (has no administration in time range)   finasteride (PROSCAR) tablet 5 mg (has no administration in time range)   formoterol (PERFOROMIST) nebulizer solution 20 mcg (has no administration in time range)   gabapentin (NEURONTIN) capsule 300 mg (has no administration in time range)   metoclopramide (REGLAN) tablet 10 mg (has no administration in time range)   pantoprazole (PROTONIX) EC tablet 40 mg (has no administration in time range)   zolpidem (AMBIEN) tablet 10 mg (has no administration in time range)   enoxaparin (LOVENOX) subcutaneous injection 40 mg (has no administration in time range)   sodium chloride 0 9 % infusion (has no administration in time range)   methylPREDNISolone sodium succinate (Solu-MEDROL) injection 60 mg (has no administration in time range)   ipratropium-albuterol (DUO-NEB) 0 5-2 5 mg/3 mL inhalation solution 3 mL (has no administration in time range) ipratropium-albuterol (DUO-NEB) 0 5-2 5 mg/3 mL inhalation solution 3 mL (3 mL Nebulization Given 1/26/20 1336)   ipratropium-albuterol (DUO-NEB) 0 5-2 5 mg/3 mL inhalation solution 3 mL (3 mL Nebulization Given 1/26/20 1401)   methylPREDNISolone sodium succinate (Solu-MEDROL) injection 125 mg (125 mg Intravenous Given 1/26/20 1335)       Diagnostic Studies  Results Reviewed     Procedure Component Value Units Date/Time    Platelet count [[id number]]     Lab Status:  No result Specimen:  Blood     Magnesium [[id number]]  (Normal) Collected:  01/26/20 1335    Lab Status:  Final result Specimen:  Blood from Arm, Left Updated:  01/26/20 1408     Magnesium 2 0 mg/dL     NT-BNP PRO [[id number]]  (Normal) Collected:  01/26/20 1335    Lab Status:  Final result Specimen:  Blood from Arm, Left Updated:  01/26/20 1408     NT-proBNP 120 pg/mL     Troponin I [[id number]]  (Normal) Collected:  01/26/20 1335    Lab Status:  Final result Specimen:  Blood from Arm, Left Updated:  01/26/20 1407     Troponin I 0 02 ng/mL     Comprehensive metabolic panel [[id number]]  (Abnormal) Collected:  01/26/20 1335    Lab Status:  Final result Specimen:  Blood from Arm, Left Updated:  01/26/20 1407     Sodium 140 mmol/L      Potassium 3 6 mmol/L      Chloride 101 mmol/L      CO2 31 mmol/L      ANION GAP 8 mmol/L      BUN 17 mg/dL      Creatinine 1 87 mg/dL      Glucose 111 mg/dL      Calcium 10 2 mg/dL      AST 30 U/L      ALT 40 U/L      Alkaline Phosphatase 68 U/L      Total Protein 7 0 g/dL      Albumin 3 9 g/dL      Total Bilirubin 1 20 mg/dL      eGFR 34 ml/min/1 73sq m     Narrative:       [place] guidelines for Chronic Kidney Disease (CKD):     Stage 1 with normal or high GFR (GFR > 90 mL/min/1 73 square meters)    Stage 2 Mild CKD (GFR = 60-89 mL/min/1 73 square meters)    Stage 3A Moderate CKD (GFR = 45-59 mL/min/1 73 square meters)    Stage 3B Moderate CKD (GFR = 30-44 mL/min/1 73 square meters)    Stage 4 Severe CKD (GFR = 15-29 mL/min/1 73 square meters)    Stage 5 End Stage CKD (GFR <15 mL/min/1 73 square meters)  Note: GFR calculation is accurate only with a steady state creatinine    Protime-INR [[id number]]  (Normal) Collected:  01/26/20 1334    Lab Status:  Final result Specimen:  Blood from Arm, Left Updated:  01/26/20 1402     Protime 10 9 seconds      INR 1 01    APTT [[id number]]  (Normal) Collected:  01/26/20 1334    Lab Status:  Final result Specimen:  Blood from Arm, Left Updated:  01/26/20 1402     PTT 23 seconds     CBC and differential [[id number]] Collected:  01/26/20 1335    Lab Status:  Final result Specimen:  Blood from Arm, Left Updated:  01/26/20 1344     WBC 6 80 Thousand/uL      RBC 4 04 Million/uL      Hemoglobin 13 4 g/dL      Hematocrit 38 4 %      MCV 95 fL      MCH 33 2 pg      MCHC 34 9 g/dL      RDW 14 6 %      MPV 9 1 fL      Platelets 828 Thousands/uL      nRBC 0 /100 WBCs      Neutrophils Relative 54 %      Immat GRANS % 1 %      Lymphocytes Relative 29 %      Monocytes Relative 12 %      Eosinophils Relative 3 %      Basophils Relative 1 %      Neutrophils Absolute 3 71 Thousands/µL      Immature Grans Absolute 0 04 Thousand/uL      Lymphocytes Absolute 1 97 Thousands/µL      Monocytes Absolute 0 78 Thousand/µL      Eosinophils Absolute 0 23 Thousand/µL      Basophils Absolute 0 07 Thousands/µL                  XR chest 1 view portable    (Results Pending)              Procedures  ECG 12 Lead Documentation Only  Performed by: Raudel Mckeon DO  Authorized by: Raudel Mckeon DO     ECG reviewed by me, the ED Provider: yes    Patient location:  ED  Previous ECG:     Previous ECG:  Unavailable    Comparison to cardiac monitor: Yes    Interpretation:     Interpretation: non-specific    Rate:     ECG rate assessment: normal    Rhythm:     Rhythm: sinus rhythm    Ectopy:     Ectopy: none    QRS:     QRS axis:  Normal  Conduction:     Conduction: normal    ST segments:     ST segments: Non-specific  T waves:     T waves: non-specific               ED Course                               MDM  Number of Diagnoses or Management Options  COPD with acute exacerbation [facility]):   Diagnosis management comments: Patient evaluated with labs, imaging  Reviewed results discussed with patient  Patient given nebulizer treatments and steroids IV  Patient admitted to [facility] practice service for further evaluation and management  Patient verbalized understanding of results and agreed with the plan  Amount and/or Complexity of Data Reviewed  Clinical lab tests: ordered and reviewed  Tests in the radiology section of CPT®: ordered and reviewed  Tests in the medicine section of CPT®: ordered and reviewed    Patient Progress  Patient progress: stable        Disposition  Final diagnoses:   COPD with acute exacerbation ([facility] [address] )     Time reflects when diagnosis was documented in both MDM as applicable and the Disposition within this note     Time User Action Codes Description Comment    1/26/2020  3:40 PM Ines Alston Add [J44 1] COPD with acute exacerbation ([facility] [address] )     1/26/2020  4:33 PM Shiv Mabry Add [E88 01] AAT (alpha-1-antitrypsin) deficiency [facility])       ED Disposition     ED Disposition Condition Date/Time Comment    Admit Stable Sun Jan 26, 2020  3:40 PM          Follow-up Information    None         Current Discharge Medication List      CONTINUE these medications which have NOT CHANGED    Details   amLODIPine (NORVASC) 10 mg tablet TAKE ONE TABLET BY MOUTH ONE TIME DAILY   Qty: 30 tablet, Refills: 4    Associated Diagnoses: Essential hypertension      budesonide (PULMICORT) 0 5 mg/2 mL nebulizer solution Take 1 vial (0 5 mg total) by nebulization 2 (two) times a day for 5 days Rinse mouth after use    Qty: 10 vial, Refills: 0    Associated Diagnoses: Centrilobular emphysema (HCC)      busPIRone (BUSPAR) 10 mg tablet Take 1 tablet (10 mg total) by mouth 3 (three) times a day  Qty: 90 tablet, Refills: 0    Associated Diagnoses: Chronic respiratory failure with hypoxia (HCC)      famotidine (PEPCID) 20 mg tablet Take 1 tablet (20 mg total) by mouth 2 (two) times a day  Qty: 60 tablet, Refills: 3    Associated Diagnoses: Gastroesophageal reflux disease, esophagitis presence not specified      finasteride (PROSCAR) 5 mg tablet Take 5 mg by mouth every morning        formoterol (PERFOROMIST) 20 MCG/2ML nebulizer solution Take 20 mcg by nebulization 2 (two) times a day      gabapentin (NEURONTIN) 300 mg capsule Take 1 capsule (300 mg total) by mouth 3 (three) times a day  Qty: 90 capsule, Refills: 3    Associated Diagnoses: Neuropathy      ipratropium-albuterol (DUO-NEB) 0 5-2 5 mg/3 mL nebulizer solution Take 1 vial (3 mL total) by nebulization 4 (four) times a day  Qty: 120 vial, Refills: 6    Associated Diagnoses: COPD exacerbation (HCC)      metoclopramide (REGLAN) 10 mg tablet Take 1 tablet (10 mg total) by mouth 2 (two) times a day before meals  Qty: 60 tablet, Refills: 1    Associated Diagnoses: Gastroesophageal reflux disease with esophagitis      ondansetron (ZOFRAN) 4 mg tablet Take 1 tablet (4 mg total) by mouth every 8 (eight) hours as needed for nausea or vomiting  Qty: 20 tablet, Refills: 0    Associated Diagnoses: Gastroesophageal reflux disease, esophagitis presence not specified      OXYGEN-HELIUM IN Inhale 2L at rest- 3L with activity      pantoprazole (PROTONIX) 40 mg tablet Take 1 tablet (40 mg total) by mouth 2 (two) times a day  Qty: 60 tablet, Refills: 5    Associated Diagnoses: Gastroesophageal reflux disease without esophagitis      predniSONE 10 mg tablet Take 4 tablets daily for 4 days then 3 tablets daily for 4 days then 2 tablets daily for 4 days then 1 tablet daily for 4 days  Qty: 40 tablet, Refills: 0    Associated Diagnoses: AAT (alpha-1-antitrypsin) deficiency ([facility] [address] );  Chronic respiratory failure with hypoxia (HCC)      tamsulosin (FLOMAX) 0 4 mg TAKE ONE CAPSULE BY MOUTH ONE TIME DAILY   Qty: 30 capsule, Refills: 2    Associated Diagnoses: Benign prostatic hyperplasia with urinary frequency      triamterene-hydrochlorothiazide (DYAZIDE) 37 5-25 mg per capsule Take 1 capsule by mouth daily as needed (leg swelling)  Qty: 20 capsule, Refills: 0    Associated Diagnoses: Bilateral leg edema      VENTOLIN  (90 Base) MCG/ACT inhaler     Comments: Substitution to a formulary equivalent within the same pharmaceutical class is authorized  ZEMAIRA infusion       zolpidem (AMBIEN) 10 mg tablet TAKE ONE TABLET BY MOUTH NIGHTLY AT BEDTIME   Qty: 30 tablet, Refills: 5    Associated Diagnoses: Primary insomnia           No discharge procedures on file      ED Provider  Electronically Signed by           Dick Luz DO  01/26/20 [id number]

## 2020-01-26 NOTE — ASSESSMENT & PLAN NOTE
Patient has been experiencing generalized weakness for the past 2 weeks  · OT PT evaluation- STR rec   CM aware

## 2020-01-26 NOTE — H&P
H&P Exam - Devi Flynn 75 y o  male MRN: [id number]    Unit/Bed#: ED CT2 Encounter: [id number]      Assessment/Plan      Patient is a 74 yo male admitted for COPD exacerbation  He is being admitted under Dr Wolf Sr service and is expected to stay >2 midnights  * COPD exacerbation [facility])  Assessment & Plan  Patient with a history of AAT deficiency and centrolobular emphysema presenting with SOB of 2-3 days  On home O2 2L and reports that he has had to increase O2 to 5L  125mg solumedrol given in ED  · Continue home Pulmicort   · Continue home Performist BID  · Continue home Buspar 10mg TID (respiration stimulator)  · Patient receives weekly Zemaira injections, last injection was Monday  Unable to order this as a hospital medication  · Pulmonology consult   · Duoneb q4hrs  · IV solumedrol 60mg q8hrs    Peripheral neuropathy  Assessment & Plan  Continue gabapentin 300 mg TID    Insomnia  Assessment & Plan  Continue zolpidem 10mg     Weakness generalized  Assessment & Plan  Patient has been experiencing generalized weakness for the past 2 weeks  · OT PT evaluation    Centrilobular emphysema ([facility] [address] )  Assessment & Plan  See COPD management    CLAYTON (acute kidney injury) ([facility] [address] )  Assessment & Plan  Cr 1 87 on admission  BL is 1 0  · Hold Dyazide   · NS 75 ml/hr    BPH (benign prostatic hyperplasia)  Assessment & Plan  Last PSA was 0 5 mg in 12/2018  · continue finasteride 5mg and tamsulosin 0 4mg    Essential hypertension  Assessment & Plan  BP on admission 161/71  Home medications include amlodipine 10mg and Dyazide  · Continue amlodipine 10mg  · Hold Dyazide in light of CLAYTON     Gastroesophageal reflux disease  Assessment & Plan  Patient has a long history of stomach upset, heart burn, and diarrhea  He follows with GI outpatient  Per GI notes, he was scheduled to get EGD and colonoscopy in October 2019 but patient canceled the procedures   He complains of heartburn on admission   · Continue home meds: pepcid 20mg BID, protonix 40mg BID, and Reglan BID   · Consider GI consult for EGD     NS 75ml/hr / replete electrolytes as needed/ normal diet    Plan was reviewed with senior and attending  History of Present Illness     HPI:  Cameron Cali is a 75 y o  male with a past medical history significant for COPD, ALT deficiency, centrilobular emphysema, peripheral neuropathy, hypertension, GERD, BPH, and insomnia who presents today with shortness of breath for the past 2-3 days  Patient uses home oxygen 2 L but notes he had to increase it to 5 L the past 2 days  He reports that he has done nothing out of the ordinary  He has been using his medication as directed with no relief  He notes associated lightheadedness but denies syncope  Patient also reports heartburn that seems to be worse than normal today  Patient takes Pepcid and Protonix daily  Patient denies chest pain, diaphoresis, and leg swelling  Patient notes that his stomach has been upset for the past 2 weeks and this has caused a decrease in his appetite as well as generalized weakness  He notes loose stool with that this is normal for him  He denies blood in his stool  He denies vomiting but admits to nausea  ED: methylprednisolone 125mg x1,  Duo nebs x2    PCP: Lino Jacinto MD    Review of Systems   Constitutional: Negative for fever  Respiratory: Positive for shortness of breath and wheezing  Negative for cough  Cardiovascular: Negative for chest pain  Gastrointestinal: Positive for diarrhea and nausea  Negative for abdominal distention, blood in stool, constipation and vomiting  Neurological: Positive for light-headedness  Negative for seizures, syncope and headaches         Historical Information   Past Medical History:   Diagnosis Date    Anesthesia complication     Difficult to wake up    Arthritis     BPH (benign prostatic hyperplasia)     urinary frequency    Cancer (HCC)     basal cell neck, face    COPD [facility]chronic obstructive pulmonary disease) ([facility] [address] )     Full dentures     Hiatal hernia     History of methicillin resistant staphylococcus aureus (MRSA)     10/11/2018 MRSA (nares) positive    Hypertension     controlled    Irritable bowel syndrome     Kidney stone     at least 7 episodes    Liver disease     Alpha 1- enzyme deficiency - diagnosed   has been on weekly replacement therapy since then    Pulmonary emphysema ([facility] [address] )     1/25/15  FEV1 - 2 45 liters or 59% of predicted    RSV infection 2017    Wears glasses     for driving only     Past Surgical History:   Procedure Laterality Date    BACK SURGERY      discectomy    COLONOSCOPY      COLONOSCOPY N/A 3/10/2017    Procedure: Rosa Caldera;  Surgeon: Ernestina Kamara MD;  Location: [facility] GI LAB; Service:    Daniela Singh      removal of kidney stones    ESOPHAGOGASTRODUODENOSCOPY N/A 3/10/2017    Procedure: ESOPHAGOGASTRODUODENOSCOPY (EGD); Surgeon: Ernestina Kamara MD;  Location: [facility] GI LAB; Service:     LITHOTRIPSY      MA ESOPHAGOGASTRODUODENOSCOPY TRANSORAL DIAGNOSTIC N/A 2018    Procedure: ESOPHAGOGASTRODUODENOSCOPY (EGD); Surgeon: Ernestina Kamara MD;  Location: [facility] GI LAB;   Service: Gastroenterology    TONSILLECTOMY      VEIN LIGATION AND STRIPPING Bilateral          Social History   Social History     Substance and Sexual Activity   Alcohol Use Not Currently    Comment: stopped in      Social History     Substance and Sexual Activity   Drug Use No     Social History     Tobacco Use   Smoking Status Former Smoker    Packs/day: 1 00    Years: 60 00    Pack years: 60 00    Last attempt to quit: 2017    Years since quittin 4   Smokeless Tobacco Never Used   Tobacco Comment    quit in 2017     Family History: non-contributory    Meds/Allergies   all medications and allergies reviewed  Allergies   Allergen Reactions    Chantix [Varenicline]      Caused prostate infection       Objective   Vitals: Blood pressure 155/76, pulse 78, temperature 98 1 °F (36 7 °C), temperature source Tympanic, resp  rate 20, weight 91 kg (200 lb 9 9 oz), SpO2 98 %  No intake or output data in the 24 hours ending 01/26/20 1631    Invasive Devices     Peripheral Intravenous Line            Peripheral IV 01/26/20 Left Antecubital less than 1 day                Physical Exam   Constitutional: He appears well-developed and well-nourished  On O2 supplementation via nasal cannula   Cardiovascular: Normal rate, regular rhythm and normal heart sounds  Pulmonary/Chest: No accessory muscle usage  No tachypnea  No respiratory distress  He has wheezes in the right lower field and the left lower field  Skin:            Lab Results: I have personally reviewed pertinent reports  Imaging: I have personally reviewed pertinent reports  EKG, Pathology, and Other Studies: I have personally reviewed pertinent reports  Code Status: Level 1 - Full Code  Advance Directive and Living Will:      Power of :    POLST:      Counseling / Coordination of Care  Total floor / unit time spent today 25 minutes  Greater than 50% of total time was spent with the patient and / or family counseling and / or coordination of care  Some portions of this record may have been generated with voice recognition software  There may be translation, syntax, or grammatical errors  Occasional wrong word or "sound-a-like" substitutions may have occurred due to the inherent limitations of the voice recognition software     [address] [facility]   PGY1

## 2020-01-26 NOTE — ASSESSMENT & PLAN NOTE
Cr 1 87 on admission   BL is 1 0  · Hold nephrotoxic agents   · NS 75 ml/hr  · Improved; Cr on 1/27 1 59  · switched SQL to heparin

## 2020-01-26 NOTE — ASSESSMENT & PLAN NOTE
Patient with a history of AAT deficiency and centrolobular emphysema presenting with SOB of 2-3 days  On home O2 2L and reports that he has had to increase O2 to 5L  125mg solumedrol given in ED   Trop neg  · Continue home Pulmicort   · Continue home Performist BID  · Continue home Buspar 10mg TID (respiration stimulator)  · Patient receives weekly Zemaira injections, last injection was Monday   Unable to order this as a hospital medication  · Pulmonology consult - decrease steroids, likely will need bronchoscopy   · Duoneb q4hrs  · IV steroids discontinued on 01/28

## 2020-01-26 NOTE — ASSESSMENT & PLAN NOTE
BP on admission 161/71   Home medications include amlodipine 10mg and Dyazide  · Continue amlodipine 10mg  · Hold Dyazide in light of CLAYTON

## 2020-01-26 NOTE — PLAN OF CARE
Problem: Potential for Falls  Goal: Patient will remain free of falls  Description  INTERVENTIONS:  - Assess patient frequently for physical needs  -  Identify cognitive and physical deficits and behaviors that affect risk of falls    -  [place] fall precautions as indicated by assessment   - Educate patient/family on patient safety including physical limitations  - Instruct patient to call for assistance with activity based on assessment  - Modify environment to reduce risk of injury  - Consider OT/PT consult to assist with strengthening/mobility  Outcome: Progressing     Problem: RESPIRATORY - ADULT  Goal: Achieves optimal ventilation and oxygenation  Description  INTERVENTIONS:  - Assess for changes in respiratory status  - Assess for changes in mentation and behavior  - Position to facilitate oxygenation and minimize respiratory effort  - Oxygen administered by appropriate delivery if ordered  - Initiate smoking cessation education as indicated  - Encourage broncho-pulmonary hygiene including cough, deep breathe, Incentive Spirometry  - Assess the need for suctioning and aspirate as needed  - Assess and instruct to report SOB or any respiratory difficulty  - Respiratory Therapy support as indicated  Outcome: Progressing

## 2020-01-26 NOTE — ED NOTES
Per daughter patient has had a gradual decline over the last two week  Having difficulty taking care of himself at home        Deidre Sr RN  01/26/20 5222

## 2020-01-27 PROBLEM — J44.1 COPD EXACERBATION (HCC): Status: RESOLVED | Noted: 2019-12-29 | Resolved: 2020-01-27

## 2020-01-27 LAB
ALBUMIN SERPL BCP-MCNC: 3.5 G/DL (ref 3.5–5)
ALP SERPL-CCNC: 65 U/L (ref 46–116)
ALT SERPL W P-5'-P-CCNC: 42 U/L (ref 12–78)
ANION GAP SERPL CALCULATED.3IONS-SCNC: 12 MMOL/L (ref 4–13)
AST SERPL W P-5'-P-CCNC: 36 U/L (ref 5–45)
ATRIAL RATE: 91 BPM
BASOPHILS # BLD AUTO: 0.02 THOUSANDS/ΜL (ref 0–0.1)
BASOPHILS NFR BLD AUTO: 0 % (ref 0–1)
BILIRUB SERPL-MCNC: 1 MG/DL (ref 0.2–1)
BUN SERPL-MCNC: 20 MG/DL (ref 5–25)
CALCIUM SERPL-MCNC: 8.9 MG/DL (ref 8.3–10.1)
CHLORIDE SERPL-SCNC: 99 MMOL/L (ref 100–108)
CO2 SERPL-SCNC: 24 MMOL/L (ref 21–32)
CREAT SERPL-MCNC: 1.59 MG/DL (ref 0.6–1.3)
EOSINOPHIL # BLD AUTO: 0 THOUSAND/ΜL (ref 0–0.61)
EOSINOPHIL NFR BLD AUTO: 0 % (ref 0–6)
ERYTHROCYTE [DISTWIDTH] IN BLOOD BY AUTOMATED COUNT: 14.4 % (ref 11.6–15.1)
GFR SERPL CREATININE-BSD FRML MDRD: 42 ML/MIN/1.73SQ M
GLUCOSE SERPL-MCNC: 154 MG/DL (ref 65–140)
HCT VFR BLD AUTO: 39 % (ref 36.5–49.3)
HGB BLD-MCNC: 13.6 G/DL (ref 12–17)
IMM GRANULOCYTES # BLD AUTO: 0.09 THOUSAND/UL (ref 0–0.2)
IMM GRANULOCYTES NFR BLD AUTO: 1 % (ref 0–2)
LYMPHOCYTES # BLD AUTO: 1.02 THOUSANDS/ΜL (ref 0.6–4.47)
LYMPHOCYTES NFR BLD AUTO: 14 % (ref 14–44)
MAGNESIUM SERPL-MCNC: 1.8 MG/DL (ref 1.6–2.6)
MCH RBC QN AUTO: 32.7 PG (ref 26.8–34.3)
MCHC RBC AUTO-ENTMCNC: 34.9 G/DL (ref 31.4–37.4)
MCV RBC AUTO: 94 FL (ref 82–98)
MONOCYTES # BLD AUTO: 0.15 THOUSAND/ΜL (ref 0.17–1.22)
MONOCYTES NFR BLD AUTO: 2 % (ref 4–12)
NEUTROPHILS # BLD AUTO: 6.26 THOUSANDS/ΜL (ref 1.85–7.62)
NEUTS SEG NFR BLD AUTO: 83 % (ref 43–75)
NRBC BLD AUTO-RTO: 0 /100 WBCS
PHOSPHATE SERPL-MCNC: 2.4 MG/DL (ref 2.3–4.1)
PLATELET # BLD AUTO: 187 THOUSANDS/UL (ref 149–390)
PMV BLD AUTO: 8.5 FL (ref 8.9–12.7)
POTASSIUM SERPL-SCNC: 3.8 MMOL/L (ref 3.5–5.3)
PR INTERVAL: 154 MS
PROT SERPL-MCNC: 6.9 G/DL (ref 6.4–8.2)
QRS AXIS: 35 DEGREES
QRSD INTERVAL: 104 MS
QT INTERVAL: 360 MS
QTC INTERVAL: 442 MS
RBC # BLD AUTO: 4.16 MILLION/UL (ref 3.88–5.62)
SODIUM SERPL-SCNC: 135 MMOL/L (ref 136–145)
T WAVE AXIS: -7 DEGREES
VENTRICULAR RATE: 91 BPM
WBC # BLD AUTO: 7.54 THOUSAND/UL (ref 4.31–10.16)

## 2020-01-27 PROCEDURE — 99223 1ST HOSP IP/OBS HIGH 75: CPT | Performed by: INTERNAL MEDICINE

## 2020-01-27 PROCEDURE — 97167 OT EVAL HIGH COMPLEX 60 MIN: CPT

## 2020-01-27 PROCEDURE — 99222 1ST HOSP IP/OBS MODERATE 55: CPT | Performed by: FAMILY MEDICINE

## 2020-01-27 PROCEDURE — 84100 ASSAY OF PHOSPHORUS: CPT | Performed by: STUDENT IN AN ORGANIZED HEALTH CARE EDUCATION/TRAINING PROGRAM

## 2020-01-27 PROCEDURE — 85025 COMPLETE CBC W/AUTO DIFF WBC: CPT | Performed by: STUDENT IN AN ORGANIZED HEALTH CARE EDUCATION/TRAINING PROGRAM

## 2020-01-27 PROCEDURE — 97163 PT EVAL HIGH COMPLEX 45 MIN: CPT

## 2020-01-27 PROCEDURE — 93010 ELECTROCARDIOGRAM REPORT: CPT | Performed by: INTERNAL MEDICINE

## 2020-01-27 PROCEDURE — 97110 THERAPEUTIC EXERCISES: CPT

## 2020-01-27 PROCEDURE — NC001 PR NO CHARGE: Performed by: FAMILY MEDICINE

## 2020-01-27 PROCEDURE — 99222 1ST HOSP IP/OBS MODERATE 55: CPT | Performed by: PHYSICIAN ASSISTANT

## 2020-01-27 PROCEDURE — 94640 AIRWAY INHALATION TREATMENT: CPT

## 2020-01-27 PROCEDURE — 80053 COMPREHEN METABOLIC PANEL: CPT | Performed by: STUDENT IN AN ORGANIZED HEALTH CARE EDUCATION/TRAINING PROGRAM

## 2020-01-27 PROCEDURE — 83735 ASSAY OF MAGNESIUM: CPT | Performed by: STUDENT IN AN ORGANIZED HEALTH CARE EDUCATION/TRAINING PROGRAM

## 2020-01-27 PROCEDURE — 94760 N-INVAS EAR/PLS OXIMETRY 1: CPT

## 2020-01-27 PROCEDURE — 94664 DEMO&/EVAL PT USE INHALER: CPT

## 2020-01-27 RX ORDER — METHYLPREDNISOLONE SODIUM SUCCINATE 40 MG/ML
20 INJECTION, POWDER, LYOPHILIZED, FOR SOLUTION INTRAMUSCULAR; INTRAVENOUS EVERY 8 HOURS SCHEDULED
Status: DISCONTINUED | OUTPATIENT
Start: 2020-01-27 | End: 2020-01-28

## 2020-01-27 RX ORDER — HEPARIN SODIUM 5000 [USP'U]/ML
5000 INJECTION, SOLUTION INTRAVENOUS; SUBCUTANEOUS EVERY 8 HOURS SCHEDULED
Status: COMPLETED | OUTPATIENT
Start: 2020-01-27 | End: 2020-01-29

## 2020-01-27 RX ADMIN — AMLODIPINE BESYLATE 10 MG: 10 TABLET ORAL at 10:00

## 2020-01-27 RX ADMIN — PANTOPRAZOLE SODIUM 40 MG: 40 TABLET, DELAYED RELEASE ORAL at 17:07

## 2020-01-27 RX ADMIN — GABAPENTIN 300 MG: 300 CAPSULE ORAL at 17:07

## 2020-01-27 RX ADMIN — BUSPIRONE HYDROCHLORIDE 10 MG: 10 TABLET ORAL at 21:19

## 2020-01-27 RX ADMIN — BUDESONIDE 0.5 MG: 0.5 INHALANT RESPIRATORY (INHALATION) at 07:32

## 2020-01-27 RX ADMIN — HEPARIN SODIUM 5000 UNITS: 5000 INJECTION INTRAVENOUS; SUBCUTANEOUS at 09:57

## 2020-01-27 RX ADMIN — HEPARIN SODIUM 5000 UNITS: 5000 INJECTION INTRAVENOUS; SUBCUTANEOUS at 14:08

## 2020-01-27 RX ADMIN — BUSPIRONE HYDROCHLORIDE 10 MG: 10 TABLET ORAL at 09:59

## 2020-01-27 RX ADMIN — IPRATROPIUM BROMIDE AND ALBUTEROL SULFATE 3 ML: 2.5; .5 SOLUTION RESPIRATORY (INHALATION) at 11:19

## 2020-01-27 RX ADMIN — IPRATROPIUM BROMIDE AND ALBUTEROL SULFATE 3 ML: 2.5; .5 SOLUTION RESPIRATORY (INHALATION) at 15:16

## 2020-01-27 RX ADMIN — BUSPIRONE HYDROCHLORIDE 10 MG: 10 TABLET ORAL at 17:07

## 2020-01-27 RX ADMIN — METOCLOPRAMIDE 10 MG: 10 TABLET ORAL at 17:06

## 2020-01-27 RX ADMIN — GABAPENTIN 300 MG: 300 CAPSULE ORAL at 09:58

## 2020-01-27 RX ADMIN — IPRATROPIUM BROMIDE AND ALBUTEROL SULFATE 3 ML: 2.5; .5 SOLUTION RESPIRATORY (INHALATION) at 03:42

## 2020-01-27 RX ADMIN — FAMOTIDINE 20 MG: 20 TABLET ORAL at 09:58

## 2020-01-27 RX ADMIN — FORMOTEROL FUMARATE DIHYDRATE 20 MCG: 20 SOLUTION RESPIRATORY (INHALATION) at 20:10

## 2020-01-27 RX ADMIN — IPRATROPIUM BROMIDE AND ALBUTEROL SULFATE 3 ML: 2.5; .5 SOLUTION RESPIRATORY (INHALATION) at 00:15

## 2020-01-27 RX ADMIN — GABAPENTIN 300 MG: 300 CAPSULE ORAL at 21:19

## 2020-01-27 RX ADMIN — ZOLPIDEM TARTRATE 10 MG: 5 TABLET, COATED ORAL at 21:19

## 2020-01-27 RX ADMIN — METOCLOPRAMIDE 10 MG: 10 TABLET ORAL at 06:12

## 2020-01-27 RX ADMIN — SODIUM CHLORIDE 75 ML/HR: 0.9 INJECTION, SOLUTION INTRAVENOUS at 10:02

## 2020-01-27 RX ADMIN — PANTOPRAZOLE SODIUM 40 MG: 40 TABLET, DELAYED RELEASE ORAL at 09:58

## 2020-01-27 RX ADMIN — BUDESONIDE 0.5 MG: 0.5 INHALANT RESPIRATORY (INHALATION) at 19:56

## 2020-01-27 RX ADMIN — METHYLPREDNISOLONE SODIUM SUCCINATE 60 MG: 125 INJECTION, POWDER, FOR SOLUTION INTRAMUSCULAR; INTRAVENOUS at 06:12

## 2020-01-27 RX ADMIN — METHYLPREDNISOLONE SODIUM SUCCINATE 20 MG: 40 INJECTION, POWDER, FOR SOLUTION INTRAMUSCULAR; INTRAVENOUS at 21:20

## 2020-01-27 RX ADMIN — FAMOTIDINE 20 MG: 20 TABLET ORAL at 17:06

## 2020-01-27 RX ADMIN — HEPARIN SODIUM 5000 UNITS: 5000 INJECTION INTRAVENOUS; SUBCUTANEOUS at 21:20

## 2020-01-27 RX ADMIN — FINASTERIDE 5 MG: 5 TABLET, FILM COATED ORAL at 09:58

## 2020-01-27 RX ADMIN — TAMSULOSIN HYDROCHLORIDE 0.4 MG: 0.4 CAPSULE ORAL at 09:59

## 2020-01-27 RX ADMIN — METHYLPREDNISOLONE SODIUM SUCCINATE 20 MG: 40 INJECTION, POWDER, FOR SOLUTION INTRAMUSCULAR; INTRAVENOUS at 14:08

## 2020-01-27 RX ADMIN — IPRATROPIUM BROMIDE AND ALBUTEROL SULFATE 3 ML: 2.5; .5 SOLUTION RESPIRATORY (INHALATION) at 19:56

## 2020-01-27 RX ADMIN — FORMOTEROL FUMARATE DIHYDRATE 20 MCG: 20 SOLUTION RESPIRATORY (INHALATION) at 07:32

## 2020-01-27 RX ADMIN — IPRATROPIUM BROMIDE AND ALBUTEROL SULFATE 3 ML: 2.5; .5 SOLUTION RESPIRATORY (INHALATION) at 07:32

## 2020-01-27 NOTE — PLAN OF CARE
Problem: Potential for Falls  Goal: Patient will remain free of falls  Description  INTERVENTIONS:  - Assess patient frequently for physical needs  -  Identify cognitive and physical deficits and behaviors that affect risk of falls    -  [place] fall precautions as indicated by assessment   - Educate patient/family on patient safety including physical limitations  - Instruct patient to call for assistance with activity based on assessment  - Modify environment to reduce risk of injury  - Consider OT/PT consult to assist with strengthening/mobility  Outcome: Progressing     Problem: RESPIRATORY - ADULT  Goal: Achieves optimal ventilation and oxygenation  Description  INTERVENTIONS:  - Assess for changes in respiratory status  - Assess for changes in mentation and behavior  - Position to facilitate oxygenation and minimize respiratory effort  - Oxygen administered by appropriate delivery if ordered  - Initiate smoking cessation education as indicated  - Encourage broncho-pulmonary hygiene including cough, deep breathe, Incentive Spirometry  - Assess the need for suctioning and aspirate as needed  - Assess and instruct to report SOB or any respiratory difficulty  - Respiratory Therapy support as indicated  Outcome: Progressing

## 2020-01-27 NOTE — PROGRESS NOTES
[facility] Practice Progress Note - Marilee Summers 75 y o  male MRN: [id number]    Unit/Bed#: 2 [place] [address] [address] Encounter: [id number]    Patient is a 74 yo male with AAT deficiency and emphysema admitted for COPD exacerbation and CLAYTON  CLAYTON improving, Cr today 1 59  Awaiting pulmonary recs  Assessment/Plan:  * COPD exacerbation ([facility] [address] )  Assessment & Plan  Patient with a history of AAT deficiency and centrolobular emphysema presenting with SOB of 2-3 days  On home O2 2L and reports that he has had to increase O2 to 5L  125mg solumedrol given in ED   Trop neg  · Continue home Pulmicort   · Continue home Performist BID  · Continue home Buspar 10mg TID (respiration stimulator)  · Patient receives weekly Zemaira injections, last injection was Monday  Unable to order this as a hospital medication  · Pulmonology consult   · Duoneb q4hrs  · IV solumedrol 60mg q8hrs    Peripheral neuropathy  Assessment & Plan  Continue gabapentin 300 mg TID    Insomnia  Assessment & Plan  Continue zolpidem 10mg     Weakness generalized  Assessment & Plan  Patient has been experiencing generalized weakness for the past 2 weeks  · OT PT evaluation    Centrilobular emphysema ([facility] [address] )  Assessment & Plan  See COPD management    CLAYTON (acute kidney injury) ([facility] [address] )  Assessment & Plan  Cr 1 87 on admission  BL is 1 0  · Hold nephrotoxic agents   · NS 75 ml/hr  · Improved; Cr on 1/27 1 59    BPH (benign prostatic hyperplasia)  Assessment & Plan  Last PSA was 0 5 mg in 12/2018  · continue finasteride 5mg and tamsulosin 0 4mg    Essential hypertension  Assessment & Plan  BP on admission 161/71  Home medications include amlodipine 10mg and Dyazide  · Continue amlodipine 10mg  · Hold Dyazide in light of CLAYTON     Gastroesophageal reflux disease  Assessment & Plan  Patient has a long history of stomach upset, heart burn, and diarrhea  He follows with GI outpatient   Per GI notes, he was scheduled to get EGD and colonoscopy in October 2019 but patient canceled the procedures  He complains of heartburn on admission   · Continue home meds: pepcid 20mg BID, protonix 40mg BID, and Reglan BID   · Consider GI consult for EGD     NS 75 ml/hr / replete electrolytes as needed / normal diet     Subjective:     Patient seen and examined at bedside  He notes he is breathing better than yesterday  He denies GI upset at this time  Objective:     Vitals: Blood pressure 155/83, pulse 73, temperature 97 5 °F (36 4 °C), temperature source Oral, resp  rate 20, weight 91 kg (200 lb 9 9 oz), SpO2 97 %  ,Body mass index is 25 76 kg/m²    Wt Readings from Last 3 Encounters:   01/26/20 91 kg (200 lb 9 9 oz)   01/20/20 88 5 kg (195 lb)   01/13/20 92 5 kg (204 lb)     No intake or output data in the 24 hours ending 01/27/20 0712    Physical Exam:   General:  NAD, a and O x3, on 3 L supplemental oxygen via nasal cannula  Heart:  RRR with S1-S2  Lungs:  Wheezing appreciated at right lung base    Recent Results (from the past 24 hour(s))   Protime-INR    Collection Time: 01/26/20  1:34 PM   Result Value Ref Range    Protime 10 9 9 8 - 12 0 seconds    INR 1 01 0 91 - 1 09   APTT    Collection Time: 01/26/20  1:34 PM   Result Value Ref Range    PTT 23 23 - 37 seconds   CBC and differential    Collection Time: 01/26/20  1:35 PM   Result Value Ref Range    WBC 6 80 4 31 - 10 16 Thousand/uL    RBC 4 04 3 88 - 5 62 Million/uL    Hemoglobin 13 4 12 0 - 17 0 g/dL    Hematocrit 38 4 36 5 - 49 3 %    MCV 95 82 - 98 fL    MCH 33 2 26 8 - 34 3 pg    MCHC 34 9 31 4 - 37 4 g/dL    RDW 14 6 11 6 - 15 1 %    MPV 9 1 8 9 - 12 7 fL    Platelets 825 792 - 970 Thousands/uL    nRBC 0 /100 WBCs    Neutrophils Relative 54 43 - 75 %    Immat GRANS % 1 0 - 2 %    Lymphocytes Relative 29 14 - 44 %    Monocytes Relative 12 4 - 12 %    Eosinophils Relative 3 0 - 6 %    Basophils Relative 1 0 - 1 %    Neutrophils Absolute 3 71 1 85 - 7 62 Thousands/µL    Immature Grans Absolute 0 04 0 00 - 0 20 Thousand/uL Lymphocytes Absolute 1 97 0 60 - 4 47 Thousands/µL    Monocytes Absolute 0 78 0 17 - 1 22 Thousand/µL    Eosinophils Absolute 0 23 0 00 - 0 61 Thousand/µL    Basophils Absolute 0 07 0 00 - 0 10 Thousands/µL   Comprehensive metabolic panel    Collection Time: 01/26/20  1:35 PM   Result Value Ref Range    Sodium 140 136 - 145 mmol/L    Potassium 3 6 3 5 - 5 3 mmol/L    Chloride 101 100 - 108 mmol/L    CO2 31 21 - 32 mmol/L    ANION GAP 8 4 - 13 mmol/L    BUN 17 5 - 25 mg/dL    Creatinine 1 87 (H) 0 60 - 1 30 mg/dL    Glucose 111 65 - 140 mg/dL    Calcium 10 2 (H) 8 3 - 10 1 mg/dL    AST 30 5 - 45 U/L    ALT 40 12 - 78 U/L    Alkaline Phosphatase 68 46 - 116 U/L    Total Protein 7 0 6 4 - 8 2 g/dL    Albumin 3 9 3 5 - 5 0 g/dL    Total Bilirubin 1 20 (H) 0 20 - 1 00 mg/dL    eGFR 34 ml/min/1 73sq m   Magnesium    Collection Time: 01/26/20  1:35 PM   Result Value Ref Range    Magnesium 2 0 1 6 - 2 6 mg/dL   Troponin I    Collection Time: 01/26/20  1:35 PM   Result Value Ref Range    Troponin I 0 02 <=0 04 ng/mL   NT-BNP PRO    Collection Time: 01/26/20  1:35 PM   Result Value Ref Range    NT-proBNP 120 <450 pg/mL   Comprehensive metabolic panel    Collection Time: 01/27/20  5:13 AM   Result Value Ref Range    Sodium 135 (L) 136 - 145 mmol/L    Potassium 3 8 3 5 - 5 3 mmol/L    Chloride 99 (L) 100 - 108 mmol/L    CO2 24 21 - 32 mmol/L    ANION GAP 12 4 - 13 mmol/L    BUN 20 5 - 25 mg/dL    Creatinine 1 59 (H) 0 60 - 1 30 mg/dL    Glucose 154 (H) 65 - 140 mg/dL    Calcium 8 9 8 3 - 10 1 mg/dL    AST 36 5 - 45 U/L    ALT 42 12 - 78 U/L    Alkaline Phosphatase 65 46 - 116 U/L    Total Protein 6 9 6 4 - 8 2 g/dL    Albumin 3 5 3 5 - 5 0 g/dL    Total Bilirubin 1 00 0 20 - 1 00 mg/dL    eGFR 42 ml/min/1 73sq m   Magnesium    Collection Time: 01/27/20  5:13 AM   Result Value Ref Range    Magnesium 1 8 1 6 - 2 6 mg/dL   Phosphorus    Collection Time: 01/27/20  5:13 AM   Result Value Ref Range    Phosphorus 2 4 2 3 - 4 1 mg/dL CBC and differential    Collection Time: 01/27/20  6:11 AM   Result Value Ref Range    WBC 7 54 4 31 - 10 16 Thousand/uL    RBC 4 16 3 88 - 5 62 Million/uL    Hemoglobin 13 6 12 0 - 17 0 g/dL    Hematocrit 39 0 36 5 - 49 3 %    MCV 94 82 - 98 fL    MCH 32 7 26 8 - 34 3 pg    MCHC 34 9 31 4 - 37 4 g/dL    RDW 14 4 11 6 - 15 1 %    MPV 8 5 (L) 8 9 - 12 7 fL    Platelets 132 322 - 180 Thousands/uL    nRBC 0 /100 WBCs    Neutrophils Relative 83 (H) 43 - 75 %    Immat GRANS % 1 0 - 2 %    Lymphocytes Relative 14 14 - 44 %    Monocytes Relative 2 (L) 4 - 12 %    Eosinophils Relative 0 0 - 6 %    Basophils Relative 0 0 - 1 %    Neutrophils Absolute 6 26 1 85 - 7 62 Thousands/µL    Immature Grans Absolute 0 09 0 00 - 0 20 Thousand/uL    Lymphocytes Absolute 1 02 0 60 - 4 47 Thousands/µL    Monocytes Absolute 0 15 (L) 0 17 - 1 22 Thousand/µL    Eosinophils Absolute 0 00 0 00 - 0 61 Thousand/µL    Basophils Absolute 0 02 0 00 - 0 10 Thousands/µL       Current Facility-Administered Medications   Medication Dose Route Frequency Provider Last Rate Last Dose    amLODIPine (NORVASC) tablet 10 mg  10 mg Oral Daily Roseann Mabry,         budesonide (PULMICORT) inhalation solution 0 5 mg  0 5 mg Nebulization BID Roseann Mabry, DO   0 5 mg at 01/26/20 2052    busPIRone (BUSPAR) tablet 10 mg  10 mg Oral TID Shayy Vance, DO   10 mg at 01/26/20 2147    enoxaparin (LOVENOX) subcutaneous injection 40 mg  40 mg Subcutaneous Daily Roseann Viotto, DO   40 mg at 01/26/20 1814    famotidine (PEPCID) tablet 20 mg  20 mg Oral BID Roseann Hernandezo, DO   20 mg at 01/26/20 1814    finasteride (PROSCAR) tablet 5 mg  5 mg Oral QAM Roseann Mabry DO        formoterol (PERFOROMIST) nebulizer solution 20 mcg  20 mcg Nebulization BID Roseann Mabry, DO   20 mcg at 01/26/20 2109    gabapentin (NEURONTIN) capsule 300 mg  300 mg Oral TID Shayy Vance DO   300 mg at 01/26/20 2147    ipratropium-albuterol (DUO-NEB) 0 5-2 5 mg/3 mL inhalation solution 3 mL  3 mL Nebulization Q4H Roseann Cobianotto, DO   3 mL at 01/27/20 0342    methylPREDNISolone sodium succinate (Solu-MEDROL) injection 60 mg  60 mg Intravenous [facility] Roseann Viotto, DO   60 mg at 01/27/20 0612    metoclopramide (REGLAN) tablet 10 mg  10 mg Oral BID AC Roseann Viotto, DO   10 mg at 01/27/20 0612    pantoprazole (PROTONIX) EC tablet 40 mg  40 mg Oral BID Roseann Viotto, DO   40 mg at 01/26/20 1814    sodium chloride 0 9 % infusion  75 mL/hr Intravenous Continuous Roseann Aranzaotto, DO 75 mL/hr at 01/26/20 1815 75 mL/hr at 01/26/20 1815    tamsulosin (FLOMAX) capsule 0 4 mg  0 4 mg Oral Daily Roseann Viotto, DO        zolpidem (AMBIEN) tablet 10 mg  10 mg Oral HS Roseann Viotto, DO   10 mg at 01/26/20 2147       Invasive Devices     Peripheral Intravenous Line            Peripheral IV 01/26/20 Left Antecubital less than 1 day                Lab, Imaging and other studies: I have personally reviewed pertinent reports      VTE Pharmacologic Prophylaxis: Heparin  VTE Mechanical Prophylaxis: sequential compression device    [employer], DO

## 2020-01-27 NOTE — SOCIAL WORK
SW followed up with patient for STR choices  Patient chose [facility], [facility] and [employer]  Patient did not have a strong preference as to which one  SW sent referrals and will keep patient posted

## 2020-01-27 NOTE — RESPIRATORY THERAPY NOTE
RT Protocol Note  Marilee Summers 75 y o  male MRN: [id number]  Unit/Bed#: 2 [facility] [facility] Encounter: [id number]    Assessment    Principal Problem:    COPD exacerbation ([place] [address] )  Active Problems:    AAT (alpha-1-antitrypsin) deficiency ([facility] )    Gastroesophageal reflux disease    Essential hypertension    BPH (benign prostatic hyperplasia)    CLAYTON (acute kidney injury) ([facility] )    Centrilobular emphysema (HCC)    Weakness generalized    Insomnia    Peripheral neuropathy      Home Pulmonary Medications:  Duoneb, Performist, Pulmicort  Home Devices/Therapy: Home O2    Past Medical History:   Diagnosis Date    Anesthesia complication     Difficult to wake up    Arthritis     BPH (benign prostatic hyperplasia)     urinary frequency    Cancer (HCC)     basal cell neck, face    [facility]ch[facility] pulmonary [facility]) [facility]     Full dentures     Hiatal hernia     History of methicillin resistant staphylococcus aureus (MRSA)     10/11/2018 MRSA (nares) positive    Hypertension     controlled    Irritable bowel syndrome     Kidney stone     at least 7 episodes    Liver disease     Alpha 1- enzyme deficiency - diagnosed   has been on weekly replacement therapy since then    Pulmonary emphysema ([place] [address] )     1/25/15  FEV1 - 2 45 liters or 59% of predicted    RSV infection 2017    Wears glasses     for driving only     Social History     Socioeconomic History    Marital status:       Spouse name: None    Number of children: None    Years of education: None    Highest education level: None   Occupational History    None   Social Needs    Financial resource strain: None    Food insecurity:     Worry: None     Inability: None    Transportation needs:     Medical: None     Non-medical: None   Tobacco Use    Smoking status: Former Smoker     Packs/day: 1 00     Years: 60 00     Pack years: 60 00     Last attempt to quit: 2017     Years since quittin 4    Smokeless tobacco: Never Used    Tobacco comment: quit in august 2017   Substance and Sexual Activity    Alcohol use: Not Currently     Frequency: Never     Comment: stopped in 2009    Drug use: No    Sexual activity: None   Lifestyle    Physical activity:     Days per week: None     Minutes per session: None    Stress: None   Relationships    Social connections:     Talks on phone: None     Gets together: None     Attends Mosque service: None     Active member of club or organization: None     Attends meetings of clubs or organizations: None     Relationship status: None    Intimate partner violence:     Fear of current or ex partner: None     Emotionally abused: None     Physically abused: None     Forced sexual activity: None   Other Topics Concern    None   Social History Narrative    None       Subjective         Objective    Physical Exam:   Assessment Type: Pre-treatment  General Appearance: Awake, Alert  Respiratory Pattern: Normal  Chest Assessment: Chest expansion symmetrical  Bilateral Breath Sounds: Diminished  Cough: None  O2 Device: NC    Vitals:  Blood pressure 170/89, pulse 98, temperature 98 °F (36 7 °C), resp  rate 18, weight 91 kg (200 lb 9 9 oz), SpO2 97 %                O2 Device: NC     Plan    Respiratory Plan: Home Bronchodilator Patient pathway

## 2020-01-27 NOTE — CONSULTS
Consultation - Pulmonary Medicine  Jeff Castro 75 y o  male MRN: [id number]  Unit/Bed#: [address] Encounter: [id number]  Code Status: Level 1 - Full Code    Assessment/Plan:    Chronic hypoxemic respiratory failure with Dyspnea:  -patient is typically on 3 L NC resting and up to 5 L w/ activity   -always dyspneic with minimal exertion  -recent NL nuc med stress  -likely to benefit from ventilator > will submit Rx to DME company for [employer] 2000 ventilator    Severe emphysematous COPD:  -FEV 1 1 78L or 44% October 2019 Spirometry  -unclear if this is exacerbation or generally worsening baseline  -currently on 60 mg Every 8 hours  -decrease to 20 mg every 8 hours today   -plan to d/c tomorrow if no worsening changes or increases in oxygen requirements    Alpha-1 antitrypsin deficiency:  -is on weekly Zemaira injections  -held while hospitalized    Lung nodules:  Historically stable  Will follow up in the outpatient setting  Likely to need navigational bronchoscopy  CT guided biopsy likely too risky     D/C and Follow up Needs:  Adjustment of Oxygen if necessary  Approval for NIV  Possible Adjunct therapy I e  Kory Giang      Thank you for this consultation; we will be happy to follow with you     _________________________________    History of Present Illness   Physician Requesting Consult: Ruba Lopez MD  Reason for Consult / Principal Problem: Respiratory Failure  HX and PE limited by:     HPI:  Jeff Castro is a 75 y o  male  who presents with dyspnea times 2-3 days  Crystal Rodriguez is well known to Pulmonary associates and has a known past medical history of severe emphysematous COPD October 2019 FEV1 1 78 L or 44% chronically managed with Perforomist, Pulmicort DuoNeb,  chronic oxygen dependence of 2 L, 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 diagnosed in 2002, HTN, hyperlipidemia, GERD on both Pepcid and Protonix, BPH    History of PE February 2018 post 6 months of Eliquis treatment, off anticoagulation  Small chronic thrombus  Right upper lobe lung nodule, left lower lobe nodule  He is admitted the hospital for progressive shortness of breath and hypoxemia requiring up to 5 L of oxygen at home in the last 2 days  No significant events or sick contacts  No cough, fever, chills, mucus production increase  No hemoptysis  No wheezing  He simply reports increasing dyspnea without obvious inciting factors  He was admitted to the hospital and treated for AECOPD  He was placed on IV steroids of 60 mg Q 8 hours  Additionally, he has followed up for PET CT scanning of his pulmonary nodules recently w/ negative SUV uptake, however, in the face of nodular growth of right sided nodules  He did have positive uptake in the colon which is currently under investigation by GI with consideration for colonoscopy once stable respiratorily  Pulmonary consulted for dyspnea with question of AECOPD  HPI    Smoking history: 60 pack year smoking history   Occupational history:  deferred  Environmental History:no environmental factors or PETs  Travel history:No recent travel > does not travel from home routinely  Respiratory History:Severe Emphysematous COPD / Alpha 1 Antitrypsin  Oxygen Therapy:3 L rest / 5 L amb w/ POC  PAP Therapy:No CPAP or BIPAP  DME Company:[employer] Insurance:Medicare / [place] [employer] [place]  Has attended Pulmonary Rehab      Review of Systems   Constitutional: Negative for activity change, chills, fatigue, fever and unexpected weight change  HENT: Negative for congestion, postnasal drip and rhinorrhea  Eyes: Negative for visual disturbance  Respiratory: Positive for shortness of breath  Negative for cough, chest tightness, wheezing and stridor  Cardiovascular: Negative for chest pain and leg swelling  Gastrointestinal: Negative for abdominal pain, diarrhea, nausea and vomiting  Endocrine: Negative for cold intolerance and heat intolerance     Genitourinary: Negative for flank pain  Musculoskeletal: Negative for arthralgias, joint swelling and myalgias  Skin: Negative for color change  Allergic/Immunologic: Negative for environmental allergies  Neurological: Negative for syncope and light-headedness  Hematological: Negative for adenopathy  Psychiatric/Behavioral: Negative for confusion  Past Medical/Surgical History  Past Medical History:   Diagnosis Date    Anesthesia complication     Difficult to wake up    Arthritis     BPH (benign prostatic hyperplasia)     urinary frequency    Cancer (HCC)     basal cell neck, face    COPD (chronic obstructive pulmonary disease) (HCC)     Full dentures     Hiatal hernia     History of methicillin resistant staphylococcus aureus (MRSA)     10/11/2018 MRSA (nares) positive    Hypertension     controlled    Irritable bowel syndrome     Kidney stone     at least 7 episodes    Liver disease     Alpha 1- enzyme deficiency - diagnosed 2002  has been on weekly replacement therapy since then    Pulmonary emphysema ([facility] [address] )     1/25/15  FEV1 - 2 45 liters or 59% of predicted    RSV infection 12/2017    Wears glasses     for driving only     Past Surgical History:   Procedure Laterality Date    BACK SURGERY  2008    discectomy    COLONOSCOPY      COLONOSCOPY N/A 3/10/2017    Procedure: Keke Na;  Surgeon: Gia Fleming MD;  Location: [facility] GI LAB; Service:    Almeta Bosworth      removal of kidney stones    ESOPHAGOGASTRODUODENOSCOPY N/A 3/10/2017    Procedure: ESOPHAGOGASTRODUODENOSCOPY (EGD); Surgeon: Gia Fleming MD;  Location: [facility] GI LAB; Service:     LITHOTRIPSY      IN ESOPHAGOGASTRODUODENOSCOPY TRANSORAL DIAGNOSTIC N/A 11/20/2018    Procedure: ESOPHAGOGASTRODUODENOSCOPY (EGD); Surgeon: Gia Fleming MD;  Location: [facility] GI LAB;   Service: Gastroenterology    TONSILLECTOMY      VEIN LIGATION AND STRIPPING Bilateral     1980's       Social History  Social History     Substance and Sexual Activity   Alcohol Use Not Currently    Frequency: Never    Comment: stopped in      Social History     Substance and Sexual Activity   Drug Use No     Social History     Tobacco Use   Smoking Status Former Smoker    Packs/day: 1 00    Years: 60 00    Pack years: 60 00    Last attempt to quit: 2017    Years since quittin 4   Smokeless Tobacco Never Used   Tobacco Comment    quit in 2017       Family History  Family History   Problem Relation Age of Onset    Emphysema Mother         never smoked    Emphysema Father     Cancer Brother         colon    Colon cancer Brother     Ulcerative colitis Family     Liver disease Family        Allergies  Allergies   Allergen Reactions    Chantix [Varenicline]      Caused prostate infection       Home Meds:   Medications Prior to Admission   Medication Sig Dispense Refill Last Dose    amLODIPine (NORVASC) 10 mg tablet TAKE ONE TABLET BY MOUTH ONE TIME DAILY  30 tablet 4 Taking    budesonide (PULMICORT) 0 5 mg/2 mL nebulizer solution Take 1 vial (0 5 mg total) by nebulization 2 (two) times a day for 5 days Rinse mouth after use   10 vial 0 Taking    busPIRone (BUSPAR) 10 mg tablet Take 1 tablet (10 mg total) by mouth 3 (three) times a day 90 tablet 0 Taking    famotidine (PEPCID) 20 mg tablet Take 1 tablet (20 mg total) by mouth 2 (two) times a day 60 tablet 3 Taking    finasteride (PROSCAR) 5 mg tablet Take 5 mg by mouth every morning     Taking    formoterol (PERFOROMIST) 20 MCG/2ML nebulizer solution Take 20 mcg by nebulization 2 (two) times a day   Taking    gabapentin (NEURONTIN) 300 mg capsule Take 1 capsule (300 mg total) by mouth 3 (three) times a day 90 capsule 3 Taking    ipratropium-albuterol (DUO-NEB) 0 5-2 5 mg/3 mL nebulizer solution Take 1 vial (3 mL total) by nebulization 4 (four) times a day 120 vial 6 Taking    metoclopramide (REGLAN) 10 mg tablet Take 1 tablet (10 mg total) by mouth 2 (two) times a day before meals 60 tablet 1     ondansetron (ZOFRAN) 4 mg tablet Take 1 tablet (4 mg total) by mouth every 8 (eight) hours as needed for nausea or vomiting 20 tablet 0 Taking    OXYGEN-HELIUM IN Inhale 2L at rest- 3L with activity   Taking    pantoprazole (PROTONIX) 40 mg tablet Take 1 tablet (40 mg total) by mouth 2 (two) times a day 60 tablet 5 Taking    predniSONE 10 mg tablet Take 4 tablets daily for 4 days then 3 tablets daily for 4 days then 2 tablets daily for 4 days then 1 tablet daily for 4 days (Patient not taking: Reported on 1/13/2020) 40 tablet 0 Not Taking    tamsulosin (FLOMAX) 0 4 mg TAKE ONE CAPSULE BY MOUTH ONE TIME DAILY  30 capsule 2 Taking    triamterene-hydrochlorothiazide (DYAZIDE) 37 5-25 mg per capsule Take 1 capsule by mouth daily as needed (leg swelling) 20 capsule 0     VENTOLIN  (90 Base) MCG/ACT inhaler    Taking    ZEMAIRA infusion    Taking    zolpidem (AMBIEN) 10 mg tablet TAKE ONE TABLET BY MOUTH NIGHTLY AT BEDTIME  30 tablet 5 Taking     Current Meds:   Scheduled Meds:  Current Facility-Administered Medications:  amLODIPine 10 mg Oral Daily Roseann Mabry, DO    budesonide 0 5 mg Nebulization BID [employer], DO    busPIRone 10 mg Oral TID Celine Mabry, DO    famotidine 20 mg Oral BID Roseann Mabry, DO    finasteride 5 mg Oral QAM Roseann Mabry, DO    formoterol 20 mcg Nebulization BID [employer], DO    gabapentin 300 mg Oral TID [employer], DO    heparin (porcine) 5,000 Units Subcutaneous Q8H [address] Roseann Hernandezo, DO    ipratropium-albuterol 3 mL Nebulization Q4H Roseann Mabry, DO    methylPREDNISolone sodium succinate 60 mg Intravenous Q8H [address] Roseann Mabry, DO    metoclopramide 10 mg Oral BID AC Roseann Mabry, DO    pantoprazole 40 mg Oral BID Roseann Mabry, DO    sodium chloride 75 mL/hr Intravenous Continuous Roseann Clotilde, DO Last Rate: 75 mL/hr (01/27/20 1002)   tamsulosin 0 4 mg Oral Daily Roseann Mabry, DO    zolpidem 10 mg Oral HS Roseann Mabry, DO PRN Meds:     ____________________________________________________________________    Objective   Vitals:   Temp:  [97 5 °F (36 4 °C)-98 1 °F (36 7 °C)] 98 °F (36 7 °C)  HR:  [70-98] 87  Resp:  [16-24] 19  BP: (136-170)/(70-91) 144/71  Weight (last 2 days)     Date/Time   Weight    01/26/20 18:01:26   91 (200 62)    01/26/20 1319   91 (200 62)            Oxygen Therapy  SpO2: 97 %  O2 Flow Rate (L/min): 3 L/min    IV Infusions:     sodium chloride 75 mL/hr Last Rate: 75 mL/hr (01/27/20 1002)       Nutrition:        Diet Orders   (From admission, onward)             Start     Ordered    01/26/20 1847  Room Service  Once     Question:  Type of Service  Answer:  Room Service-Appropriate    01/26/20 1846    01/26/20 1558  Diet Regular; Regular House  Diet effective now     Question Answer Comment   Diet Type Regular    Regular Regular House    RD to adjust diet per protocol? Yes        01/26/20 1557                  Ins/Outs:   I/O     None            Lines/Drains:  Invasive Devices     Peripheral Intravenous Line            Peripheral IV 01/26/20 Left Antecubital less than 1 day                ____________________________________________________________________      Physical Exam   Constitutional: He is oriented to person, place, and time  He appears well-developed  HENT:   Head: Normocephalic and atraumatic  Eyes: Pupils are equal, round, and reactive to light  Conjunctivae are normal    Neck: Normal range of motion  Neck supple  Cardiovascular: Normal rate, regular rhythm and normal heart sounds  Exam reveals no gallop and no friction rub  No murmur heard  Pulmonary/Chest: Effort normal  No accessory muscle usage  No tachypnea  No respiratory distress  He has decreased breath sounds in the right upper field, the right middle field, the right lower field, the left upper field, the left middle field and the left lower field  He has no wheezes  He has no rhonchi  He has no rales   He exhibits no tenderness  Moderate to severely decreased breath sounds    Currently on 3 L nasal cannula      Does not appear his breathlessness as he did in the past   Abdominal: Soft  Bowel sounds are normal    Musculoskeletal: Normal range of motion  He exhibits no edema  Neurological: He is alert and oriented to person, place, and time  Skin: Skin is warm and dry  Psychiatric: He has a normal mood and affect        ____________________________________________________________________    Labs:   CBC: Results from last 7 days   Lab Units 01/27/20  0611 01/26/20  1335   WBC Thousand/uL 7 54 6 80   HEMOGLOBIN g/dL 13 6 13 4   HEMATOCRIT % 39 0 38 4   MCV fL 94 95   PLATELETS Thousands/uL 187 200     CMP: Results from last 7 days   Lab Units 01/27/20  0513 01/26/20  1335   POTASSIUM mmol/L 3 8 3 6   CHLORIDE mmol/L 99* 101   CO2 mmol/L 24 31   BUN mg/dL 20 17   CREATININE mg/dL 1 59* 1 87*   CALCIUM mg/dL 8 9 10 2*   AST U/L 36 30   ALT U/L 42 40   ALK PHOS U/L 65 68   EGFR ml/min/1 73sq m 42 34     Magnesium:   Results from last 7 days   Lab Units 01/27/20  0513   MAGNESIUM mg/dL 1 8     Phosphorous:   Results from last 7 days   Lab Units 01/27/20  0513   PHOSPHORUS mg/dL 2 4     Troponin:   Results from last 7 days   Lab Units 01/26/20  1335   TROPONIN I ng/mL 0 02     PT/INR:   Results from last 7 days   Lab Units 01/26/20  1334   PTT seconds 23   INR  1 01     Lactic Acid:     BNP:   Results from last 7 days   Lab Units 01/26/20  1335   NT-PRO BNP pg/mL 120     ABG:      Procalcitonin: Invalid input(s): PROCALCITONIN    Imaging:             XR chest 1 view portable   Final Result by Davin Moy MD (01/27 0915)      No acute cardiopulmonary disease              Workstation performed: [id number]               ===========================================================REPORT     Exam Requested: CTA CHEST PE STUDY   Requesting Physician: Ricardo Ugalde    Date Scheduled: 12- 16:15   Patient Name: Bessy Manuel Rafita Chu    MRN: [id number]   YOB: 1945   Sex: M   Date of Exam: 12- 16:15   Accession Number: [id number]     Reason for Exam:     RADIOLOGICAL REPORT : CTA - CHEST WITH IV CONTRAST - PULMONARY ANGIOGRAM    INDICATION: Dyspnea, chronic  COMPARISON: CT chest 12/3/2019, CTA chest 6/30/2019, 4/11/2019 and 3/30/2018, PET/CT 2/12/2018 and lung screening CT study 1/24/2018    TECHNIQUE: CTA examination of the chest was performed using angiographic technique according to a protocol specifically tailored to evaluate for pulmonary embolism  Axial, sagittal, and coronal 2D reformatted images were created from the source data   and  submitted for interpretation  In addition, coronal 3D MIP postprocessing was performed on the acquisition scanner  Radiation dose length product (DLP) for this visit: 602 77 mGy-cm   This examination, like all CT scans performed in the [facility], was performed utilizing techniques to minimize radiation dose exposure, including the use of   iterative  reconstruction and automated exposure control  IV Contrast: 85 mL of iohexol (OMNIPAQUE)    FINDINGS:    PULMONARY ARTERIAL TREE: No acute pulmonary embolus is seen  Again seen is crescentic decreased attenuation along the posterior aspect of left lower lobe pulmonary artery, unchanged from previous multiple previous studies  This likely represents chronic pulmonary embolus  LUNGS: Emphysema changes again noted with bullous changes in the lower lobes  7 x 6 x 9 mm irregular left lower lobe nodule (series 3/74 and series 601 image 118)  This nodule does not to correspond to previously described 1 3 x 0 6 cm left lower lobe abnormality on remote studies from 2018  In fact, I believe this corresponds   to a 2 mm nodule seen on the March 30, 2018 study series 601 image 108  This nodule should be deemed suspicious for bronchogenic carcinoma       4 mm right upper lobe nodule series 601 image 113 measured about 6 mm on the April 2019 exam     Minor atelectatic changes seen in the posterior right lower lobe and lingula  No central endobronchial lesions  PLEURA: Tiny left pleural effusion  Fibrocalcific scarring left apex  Mild pleural calcification posterior right apex  HEART/GREAT VESSELS: The heart is normal size  Atherosclerotic changes thoracic aorta and coronary arteries  MEDIASTINUM AND MIKE: No pathologic adenopathy  CHEST WALL AND LOWER NECK: Unremarkable  VISUALIZED STRUCTURES IN THE UPPER ABDOMEN: Fatty liver infiltration better appreciated on the recent noncontrast CT study  OSSEOUS STRUCTURES: No acute fracture or destructive osseous lesion  Degenerative changes of the spine  IMPRESSION:    1  Irregular 9 mm left lower lobe nodule demonstrating slow enlargement over time should be viewed highly suspicious for bronchogenic carcinoma  Cardiothoracic surgical consultation is advised  2  No acute pulmonary emboli  No change in chronic left lower lobe pulmonary embolus  3  Tiny left pleural effusion  4  Fatty liver  I personally discussed this study with Dr Nate Mcnally covering for this patient on 12/4/2019 at 5:18 PM               Workstation performed: [id number]    Signed by:  Nicky Cheek DO  12/4/19   Report Author: Leigh Naik    Approved by: Leigh Naik    Approval Date: 12-   Approval Time: 17:24     THIS REPORT WAS RECEIVED FROM AN EXTERNAL RIS SYSTEM   ----------------------------------------------------                          PET/CT SCAN     INDICATION: Enlarging lung nodule  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, bronchus and lung     MODIFIER: PI     COMPARISON: CT chest 12/4/2019 and additional priors     CELL TYPE:  N/A     TECHNIQUE:   8 3 mCi F-18-FDG administered IV  Multiplanar attenuation corrected and non attenuation corrected PET images were acquired 60 minutes post injection   Contiguous, low dose, axial CT sections were obtained from the skull vertex through the   femurs   Intravenous contrast material was not utilized  This examination, like all CT scans performed in the [facility], was performed utilizing techniques to minimize radiation dose exposure, including the use of iterative   reconstruction and automated exposure control       Fasting serum glucose: 114 mg/dl     FINDINGS:      VISUALIZED BRAIN:     No acute abnormalities are seen      HEAD/NECK:     There is a physiologic distribution of FDG  No FDG avid cervical adenopathy is seen  CT images: Unremarkable      CHEST:     No focal FDG uptake in the left lower lobe nodule  On the current exam this measures up to 8 mm in size, image 132 series 3  This was believed to be a 2 mm nodule on the 3/30/2018 exam   This appears new from CT of 12/4/2013      No suspicious focal FDG uptake in the mediastinal or perihilar regions      Linear FDG uptake in the esophagus may be physiologic or related to reflux  CT images: Scattered calcified pleural plaque formation  Underlying emphysematous changes of the lung fields      ABDOMEN:     Somewhat focal FDG uptake at the level of the distal descending colon, SUV max of 3 4  This is more pronounced on the Q clear images, SUV max of 6 0  Suggestion of circumferential wall thickening here on CT  See image 233 series 1202 of the PET/CT   fusion images      Otherwise heterogeneous bowel activity is likely physiologic        CT images: There are a few renal cysts  2 mm calculus in the right kidney lower pole  Scattered colonic diverticulosis      PELVIS:   No FDG avid soft tissue lesions are seen  CT images: Prostate is mildly prominent      OSSEOUS STRUCTURES:  No FDG avid lesions are seen  CT images: Multilevel degenerative spurring of the spine      IMPRESSION:     1  No focal FDG uptake in the left lower lobe nodule  This may be at the lower limits of resolution however    Given interval enlargement of this nodule over time, malignancy is not excluded  Recommend continued surveillance with chest CT if tissue   sampling is not performed  A follow up chest CT could be performed in 3 months  2   Somewhat focal FDG uptake at the level of the distal descending colon, underlying lesion is not excluded  Correlate with colonoscopy  PFT:      EKG/ECHO:     [address]  [address] [facility]  [address]  [phone]     Rest/Stress Gated SPECT Myocardial Perfusion Imaging After Regadenoson     Patient: Yuval Rehman  MR number: [id number]  Account number: [de-identified]  : 1945  Age: 75 years  Gender: Male  Status: Inpatient  Location: Stress lab  Height: 77 in  Weight: 192 lb  BP: 172/ 80 mmHg     Allergies: VARENICLINE     Diagnosis: I21 4 - Non-ST elevation (NSTEMI) myocardial infarction, R07 9 - Chest pain, unspecified     Primary Physician:  Paige Kiran MD  RN:  ANA Pereyra  Group:  Manpreet Gale  Report Prepared By[de-identified]  ANA Pereyra  Interpreting Physician:  Natanael Shields MD     INDICATIONS: Evaluation for coronary artery disease      HISTORY: The patient is a 75year old  male  Chest pain status: chest pain  Other symptoms: dyspnea  Coronary artery disease risk factors: hypertension  Cardiovascular history: none significant  Co-morbidity: history of lung  disease  Medications: a calcium channel blocker      PHYSICAL EXAM: Baseline physical exam screening: scant wheezing audible      REST ECG: Normal sinus rhythm      PROCEDURE: The study was performed in the the Stress lab  A regadenoson infusion pharmacologic stress test was performed  Gated SPECT myocardial perfusion imaging was performed after stress and at rest  Systolic blood pressure was 172  mmHg, at the start of the study  Diastolic blood pressure was 80 mmHg, at the start of the study  The heart rate was 92 bpm, at the start of the study   IV double checked      DOBUTAMINE PROTOCOL:  Time HR bpm SBP mmHg DBP mmHg Symptoms Rhythm/conduct  Baseline 13:31 93 172 80 none NSR  10 mcg/kg/min 13:34 92 180 86 none same as above  15 mcg/kg/min 13:37 100 195 89 none --  20 mcg/kg/min 13:38 107 183 69 chest discomfort, dizziness --  Immediate 13:43 134 187 81 same as above --  Recovery I 13:46 114 178 74 subsiding --  Recovery II 13:48 105 173 79 subsiding --  Recovery III 13:50 97 174 79 none --  No medications or fluids given      STRESS SUMMARY: Duration of pharmacologic stress was 10 min and 5 sec   total dose given= 13 4 mg Maximal heart rate during stress was 139 bpm ( 96 % of maximal predicted heart rate)  The heart rate response to stress was normal  There was resting hypertension with a hypertensive blood pressure response to  stress  The rate-pressure product for the peak heart rate and blood pressure was 59450  The patient experienced chest pain during stress; pain resolved spontaneously  The stress test was terminated due to protocol completion and  achievement of target heart rate  Pre oxygen saturation: 97 %  The stress ECG was equivocal for ischemia  Arrhythmia during stress: isolated premature ventricular beats      ISOTOPE ADMINISTRATION:  Resting isotope administration Stress isotope administration  Agent Tetrofosmin Tetrofosmin  Dose 11 mCi 32 4 mCi  Date 11/11/2019 11/11/2019     Stress isotope administration  Agent Tetrofosmin  Dose 11 mCi  Date 11/11/2019     The radiopharmaceutical was injected at the peak effect of pharmacologic stress      MYOCARDIAL PERFUSION IMAGING:  The image quality was fair  Rotating projection images reveal moderate diaphragmatic attenuation and moderate patient motion  Left ventricular size was normal  The TID ratio was 1 1   The right ventricle was dilated      PERFUSION DEFECTS:  -  There was a moderate-sized, moderately severe, fixed myocardial perfusion defect of the entire inferior wall likely due to diaphragm attenuation as it normalizes in prone images      GATED SPECT:  The calculated left ventricular ejection fraction was 75 %  Left ventricular ejection fraction was within normal limits by visual estimate  There was no left ventricular regional abnormality      SUMMARY:  -  Stress results: Target heart rate was achieved  There was resting hypertension with a hypertensive blood pressure response to stress  The patient experienced chest pain during stress; pain resolved spontaneously  -  ECG conclusions: The stress ECG was equivocal for ischemia  -  Perfusion imaging: There was a moderate-sized, moderately severe, fixed myocardial perfusion defect of the entire inferior wall likely due to diaphragm attenuation as it normalizes in prone images  -  Gated SPECT: The calculated left ventricular ejection fraction was 75 %  Left ventricular ejection fraction was within normal limits by visual estimate  There was no left ventricular regional abnormality   -  Impressions and recommendations: Probably normal study after pharmacologic stress without reproduction of symptoms      IMPRESSIONS: Probably normal study after pharmacologic stress without reproduction of symptoms  There was image artifact, without diagnostic evidence for perfusion abnormality  Left ventricular systolic function was normal      Prepared and signed by     Frederick Puente MD  Signed 11/12/2019 11:00:30       Micro: Lab Results   Component Value Date    BLOODCX No Growth After 5 Days  12/29/2017    BLOODCX No Growth After 5 Days   12/29/2017    WOUNDCULT 3+ Growth of Staphylococcus aureus (A) 06/12/2019    MRSACULTURE  11/11/2019     No Methicillin Resistant Staphlyococcus aureus (MRSA) isolated        ____________________________________________________________________

## 2020-01-27 NOTE — CONSULTS
Consultation - [facility]  Xiao Feliciano 75 y o  male MRN: [id number]  Unit/Bed#: [address] [place] [address] Encounter: [id number]        Inpatient consult to gastroenterology  Consult performed by: Jen Reyes PA-C  Consult ordered by: Ulysses Pulido DO          Reason for Consult / Principal Problem:  Severe GERD, abnormality in colon noted on PET scan    HPI: Xiao Feliciano is a 75y o  year old male with history of alpha-1 antitrypsin deficiency, severe COPD and centrilobar emphysema with supplemental oxygen dependence at baseline who presented to the emergency room yesterday with complaint of worsening shortness of breath over the last 3 days, having had to increase his home oxygen to 5 L from 2 L  He is currently being managed for COPD exacerbation and acute kidney injury  He had also recently been found with an enlarging lung nodule for which he recently had a PET scan done; in addition to the pulmonary nodule, the PET scan also showed some somewhat focal FDG uptake at the level of the distal descending colon, for which an underlying lesion could not be excluded  His last colonoscopy was done in March 2017 with Dr Nichol Bishop, which saw the removal of 2 small polyps from the sigmoid and rectosigmoid junction, there were also a couple of large AVMs which were cauterized with APC at that time; the polyps were not found to be adenomatous on biopsy  He was also seen in our office about 3 months ago with complaints of persistent esophageal dysphagia, regurgitation of food, heartburn symptoms despite twice daily PPI; he had also been reporting chronic diarrhea for decades  He was recommended for EGD and colonoscopy to be done in the hospital, but this was canceled by the patient as he said he was already hospitalized for respiratory issues      At this time, the patient says that his bowel movements range in frequency from 1-5 or 6 bowel movements a day, 99% of the time it is watery in consistency  He denies any blood in the stools, but says he sometimes notices bright red blood when he wipes  He says that it still feels like solid foods get stuck on the way down when he tries to swallow, this mainly happens with solids and not liquids  He says this has been going on for months  He denies any pain with swallowing  Denies any nausea or vomiting, although he has regurgitated with swallowing sometimes  He maintains he has been taking PPI twice daily but nonetheless has heartburn frequently  REVIEW OF SYSTEMS:    CONSTITUTIONAL: Denies any fever, chills, or rigors  Good appetite, and no recent weight loss  HEENT: No earache or tinnitus  Denies hearing loss or visual disturbances  CARDIOVASCULAR: No chest pain or palpitations  RESPIRATORY: Denies any cough, hemoptysis, shortness of breath or dyspnea on exertion  GASTROINTESTINAL: As noted in the History of Present Illness  GENITOURINARY: No problems with urination  Denies any hematuria or dysuria  NEUROLOGIC: No dizziness or vertigo, denies headaches  MUSCULOSKELETAL: Denies any muscle or joint pain  SKIN: Denies skin rashes or itching  ENDOCRINE: Denies excessive thirst  Denies intolerance to heat or cold  PSYCHOSOCIAL: Denies depression or anxiety  Denies any recent memory loss  Historical Information   Past Medical History:   Diagnosis Date    Anesthesia complication     Difficult to wake up    Arthritis     BPH (benign prostatic hyperplasia)     urinary frequency    Cancer (HCC)     basal cell neck, face    COPD (chronic obstructive pulmonary disease) (HCC)     COPD exacerbation ([facility] [address] ) 12/29/2019    Full dentures     Hiatal hernia     History of methicillin resistant staphylococcus aureus (MRSA)     10/11/2018 MRSA (nares) positive    Hypertension     controlled    Irritable bowel syndrome     Kidney stone     at least 7 episodes    Liver disease     Alpha 1- enzyme deficiency - diagnosed 2002   has been on weekly replacement therapy since then    Pulmonary emphysema ([facility] [address] )     1/25/15  FEV1 - 2 45 liters or 59% of predicted    RSV infection 2017    Wears glasses     for driving only     Past Surgical History:   Procedure Laterality Date    BACK SURGERY      discectomy    COLONOSCOPY      COLONOSCOPY N/A 3/10/2017    Procedure: Genevieve Wray;  Surgeon: Ting Malagon MD;  Location: [facility] GI LAB; Service:    Mary Lavender      removal of kidney stones    ESOPHAGOGASTRODUODENOSCOPY N/A 3/10/2017    Procedure: ESOPHAGOGASTRODUODENOSCOPY (EGD); Surgeon: Ting Malagon MD;  Location: [facility] GI LAB; Service:     LITHOTRIPSY      CA ESOPHAGOGASTRODUODENOSCOPY TRANSORAL DIAGNOSTIC N/A 2018    Procedure: ESOPHAGOGASTRODUODENOSCOPY (EGD); Surgeon: Ting Malagon MD;  Location: [facility] GI LAB;   Service: Gastroenterology    TONSILLECTOMY      VEIN LIGATION AND STRIPPING Bilateral          Social History   Social History     Substance and Sexual Activity   Alcohol Use Not Currently    Frequency: Never    Comment: stopped in      Social History     Substance and Sexual Activity   Drug Use No     Social History     Tobacco Use   Smoking Status Former Smoker    Packs/day: 1 00    Years: 60 00    Pack years: 60 00    Last attempt to quit: 2017    Years since quittin 4   Smokeless Tobacco Never Used   Tobacco Comment    quit in 2017     Family History   Problem Relation Age of Onset    Emphysema Mother         never smoked    Emphysema Father     Cancer Brother         colon    Colon cancer Brother     Ulcerative colitis Family     Liver disease Family        Meds/Allergies     Medications Prior to Admission   Medication    amLODIPine (NORVASC) 10 mg tablet    budesonide (PULMICORT) 0 5 mg/2 mL nebulizer solution    busPIRone (BUSPAR) 10 mg tablet    famotidine (PEPCID) 20 mg tablet    finasteride (PROSCAR) 5 mg tablet    formoterol (PERFOROMIST) 20 MCG/2ML nebulizer solution    gabapentin (NEURONTIN) 300 mg capsule    ipratropium-albuterol (DUO-NEB) 0 5-2 5 mg/3 mL nebulizer solution    metoclopramide (REGLAN) 10 mg tablet    ondansetron (ZOFRAN) 4 mg tablet    OXYGEN-HELIUM IN    pantoprazole (PROTONIX) 40 mg tablet    predniSONE 10 mg tablet    tamsulosin (FLOMAX) 0 4 mg    triamterene-hydrochlorothiazide (DYAZIDE) 37 5-25 mg per capsule    VENTOLIN  (90 Base) MCG/ACT inhaler    ZEMAIRA infusion    zolpidem (AMBIEN) 10 mg tablet     Current Facility-Administered Medications   Medication Dose Route Frequency    amLODIPine (NORVASC) tablet 10 mg  10 mg Oral Daily    budesonide (PULMICORT) inhalation solution 0 5 mg  0 5 mg Nebulization BID    busPIRone (BUSPAR) tablet 10 mg  10 mg Oral TID    famotidine (PEPCID) tablet 20 mg  20 mg Oral BID    finasteride (PROSCAR) tablet 5 mg  5 mg Oral QAM    formoterol (PERFOROMIST) nebulizer solution 20 mcg  20 mcg Nebulization BID    gabapentin (NEURONTIN) capsule 300 mg  300 mg Oral TID    heparin (porcine) subcutaneous injection 5,000 Units  5,000 Units Subcutaneous Q8H [facility]    ipratropium-albuterol (DUO-NEB) 0 5-2 5 mg/3 mL inhalation solution 3 mL  3 mL Nebulization Q4H    methylPREDNISolone sodium succinate (Solu-MEDROL) injection 20 mg  20 mg Intravenous Q8H [facility]    metoclopramide (REGLAN) tablet 10 mg  10 mg Oral BID AC    pantoprazole (PROTONIX) EC tablet 40 mg  40 mg Oral BID    sodium chloride 0 9 % infusion  75 mL/hr Intravenous Continuous    tamsulosin (FLOMAX) capsule 0 4 mg  0 4 mg Oral Daily    zolpidem (AMBIEN) tablet 10 mg  10 mg Oral HS       Allergies   Allergen Reactions    Chantix [Varenicline]      Caused prostate infection           Objective     Blood pressure 144/71, pulse 87, temperature 98 °F (36 7 °C), temperature source Oral, resp  rate 19, weight 91 kg (200 lb 9 9 oz), SpO2 97 %      No intake or output data in the 24 hours ending 01/27/20 1137      PHYSICAL EXAM General Appearance:   Alert, patient on supplemental oxygen via nasal cannula , 3 L at this time, cooperative, no distress, appears stated age    HEENT:   Normocephalic, atraumatic, anicteric      Neck:  Supple, symmetrical, trachea midline, no adenopathy;    thyroid: no enlargement/tenderness/nodules; no carotid  bruit or JVD    Lungs:   Clear to auscultation bilaterally; no rales, rhonchi or wheezing; respirations unlabored    Heart[de-identified]   S1 and S2 normal; regular rate and rhythm; no murmur, rub, or gallop     Abdomen:   Soft, non-tender, non-distended; normal bowel sounds; no masses, no organomegaly    Genitalia:   Deferred    Rectal:   Deferred    Extremities:  No cyanosis, clubbing or edema    Pulses:  2+ and symmetric all extremities    Skin:  Skin color, texture, turgor normal, no rashes or lesions    Lymph nodes:  No palpable cervical, axillary or inguinal lymphadenopathy        Lab Results:   Admission on 01/26/2020   Component Date Value    WBC 01/26/2020 6 80     RBC 01/26/2020 4 04     Hemoglobin 01/26/2020 13 4     Hematocrit 01/26/2020 38 4     MCV 01/26/2020 95     MCH 01/26/2020 33 2     MCHC 01/26/2020 34 9     RDW 01/26/2020 14 6     MPV 01/26/2020 9 1     Platelets 83/21/8421 200     nRBC 01/26/2020 0     Neutrophils Relative 01/26/2020 54     Immat GRANS % 01/26/2020 1     Lymphocytes Relative 01/26/2020 29     Monocytes Relative 01/26/2020 12     Eosinophils Relative 01/26/2020 3     Basophils Relative 01/26/2020 1     Neutrophils Absolute 01/26/2020 3 71     Immature Grans Absolute 01/26/2020 0 04     Lymphocytes Absolute 01/26/2020 1 97     Monocytes Absolute 01/26/2020 0 78     Eosinophils Absolute 01/26/2020 0 23     Basophils Absolute 01/26/2020 0 07     Protime 01/26/2020 10 9     INR 01/26/2020 1 01     PTT 01/26/2020 23     Sodium 01/26/2020 140     Potassium 01/26/2020 3 6     Chloride 01/26/2020 101     CO2 01/26/2020 31     ANION GAP 01/26/2020 8     BUN 01/26/2020 17     Creatinine 01/26/2020 1 87*    Glucose 01/26/2020 111     Calcium 01/26/2020 10 2*    AST 01/26/2020 30     ALT 01/26/2020 40     Alkaline Phosphatase 01/26/2020 68     Total Protein 01/26/2020 7 0     Albumin 01/26/2020 3 9     Total Bilirubin 01/26/2020 1 20*    eGFR 01/26/2020 34     Magnesium 01/26/2020 2 0     Troponin I 01/26/2020 0 02     NT-proBNP 01/26/2020 120     Sodium 01/27/2020 135*    Potassium 01/27/2020 3 8     Chloride 01/27/2020 99*    CO2 01/27/2020 24     ANION GAP 01/27/2020 12     BUN 01/27/2020 20     Creatinine 01/27/2020 1 59*    Glucose 01/27/2020 154*    Calcium 01/27/2020 8 9     AST 01/27/2020 36     ALT 01/27/2020 42     Alkaline Phosphatase 01/27/2020 65     Total Protein 01/27/2020 6 9     Albumin 01/27/2020 3 5     Total Bilirubin 01/27/2020 1 00     eGFR 01/27/2020 42     Magnesium 01/27/2020 1 8     Phosphorus 01/27/2020 2 4     WBC 01/27/2020 7 54     RBC 01/27/2020 4 16     Hemoglobin 01/27/2020 13 6     Hematocrit 01/27/2020 39 0     MCV 01/27/2020 94     MCH 01/27/2020 32 7     MCHC 01/27/2020 34 9     RDW 01/27/2020 14 4     MPV 01/27/2020 8 5*    Platelets 61/94/9161 187     nRBC 01/27/2020 0     Neutrophils Relative 01/27/2020 83*    Immat GRANS % 01/27/2020 1     Lymphocytes Relative 01/27/2020 14     Monocytes Relative 01/27/2020 2*    Eosinophils Relative 01/27/2020 0     Basophils Relative 01/27/2020 0     Neutrophils Absolute 01/27/2020 6 26     Immature Grans Absolute 01/27/2020 0 09     Lymphocytes Absolute 01/27/2020 1 02     Monocytes Absolute 01/27/2020 0 15*    Eosinophils Absolute 01/27/2020 0 00     Basophils Absolute 01/27/2020 0 02     Ventricular Rate 01/26/2020 91     Atrial Rate 01/26/2020 91     ID Interval 01/26/2020 154     QRSD Interval 01/26/2020 104     QT Interval 01/26/2020 360     QTC Interval 01/26/2020 442     QRS Axis 01/26/2020 35     T Wave Axis 01/26/2020 -7 Imaging Studies: I have personally reviewed pertinent reports  PET/CT SCAN     INDICATION: Enlarging lung nodule  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, bronchus and lung     MODIFIER: PI     COMPARISON: CT chest 12/4/2019 and additional priors     CELL TYPE:  N/A     TECHNIQUE:   8 3 mCi F-18-FDG administered IV  Multiplanar attenuation corrected and non attenuation corrected PET images were acquired 60 minutes post injection  Contiguous, low dose, axial CT sections were obtained from the skull vertex through the   femurs   Intravenous contrast material was not utilized  This examination, like all CT scans performed in the [facility], was performed utilizing techniques to minimize radiation dose exposure, including the use of iterative   reconstruction and automated exposure control       Fasting serum glucose: 114 mg/dl     FINDINGS:      VISUALIZED BRAIN:     No acute abnormalities are seen      HEAD/NECK:     There is a physiologic distribution of FDG  No FDG avid cervical adenopathy is seen  CT images: Unremarkable      CHEST:     No focal FDG uptake in the left lower lobe nodule  On the current exam this measures up to 8 mm in size, image 132 series 3  This was believed to be a 2 mm nodule on the 3/30/2018 exam   This appears new from CT of 12/4/2013      No suspicious focal FDG uptake in the mediastinal or perihilar regions      Linear FDG uptake in the esophagus may be physiologic or related to reflux  CT images: Scattered calcified pleural plaque formation  Underlying emphysematous changes of the lung fields      ABDOMEN:     Somewhat focal FDG uptake at the level of the distal descending colon, SUV max of 3 4  This is more pronounced on the Q clear images, SUV max of 6 0  Suggestion of circumferential wall thickening here on CT    See image 233 series 1202 of the PET/CT   fusion images      Otherwise heterogeneous bowel activity is likely physiologic        CT images: There are a few renal cysts  2 mm calculus in the right kidney lower pole  Scattered colonic diverticulosis      PELVIS:   No FDG avid soft tissue lesions are seen  CT images: Prostate is mildly prominent      OSSEOUS STRUCTURES:  No FDG avid lesions are seen  CT images: Multilevel degenerative spurring of the spine      IMPRESSION:     1  No focal FDG uptake in the left lower lobe nodule  This may be at the lower limits of resolution however  Given interval enlargement of this nodule over time, malignancy is not excluded  Recommend continued surveillance with chest CT if tissue   sampling is not performed  A follow up chest CT could be performed in 3 months  2   Somewhat focal FDG uptake at the level of the distal descending colon, underlying lesion is not excluded  Correlate with colonoscopy  ASSESSMENT/PLAN:     1  Dysphagia and refractory GERD despite twice daily PPI therapy, his last EGD was from 11/2018; suspect symptoms to be related to dysmotility but rule out any obstructing strictures or lesions    - recommend EGD when patient can be cleared for a respiratory standpoint; he is being treated for COPD exacerbation at this time and is already noted with significant baseline pulmonary dysfunction with emphysema and alpha-1 antitrypsin deficiency, requiring supplemental oxygen consistently  - consider barium swallow for evaluation of esophageal motility in the meantime  - Protonix twice daily, can also add Carafate suspension  - diet as tolerated      2  Chronic diarrhea, suspect to be functional, patient had colonoscopy a few years ago but random biopsies were not done at that time, consider underlying microscopic colitis    - recommend colonoscopy at the same time his EGD mentioned above; again, after patient can be adequately prepped and after respiratory status has been optimized       3   Abnormality in the left-sided colon noted on PET scan, rule out underlying malignancy    - again, plan for eventual colonoscopy as mentioned above; he would need to be put on clear liquid diet for the day prior to procedure  (he is eating solid food today)  - I discussed with family Medicine resident regarding this patient's case and plan    The patient was seen and examined by Dr Luis Eduardo Rod, all bass medical decisions were made with Dr Luis Eduardo Rod  Thank you for allowing us to participate in the care of this pleasant patient  We will follow up with you closely

## 2020-01-27 NOTE — OCCUPATIONAL THERAPY NOTE
OT EVALUATION       01/27/20 1515   Note Type   Note type Eval only   Restrictions/Precautions   Other Precautions Fall Risk;O2   Pain Assessment   Pain Assessment 0-10   Pain Score 3   Pain Location Chest;Abdomen   Home Living   Type of Home House  (2 TEMI front, 1 TEMI garage with rail)   Home Layout One level   Bathroom Shower/Tub Walk-in shower   Bathroom Equipment Built-in shower seat  (doesnt use because its too small)   Home Equipment Cane;Walker  (home O2, uses cane )   Prior Function   Level of [place] Needs assistance with IADLs; Independent with ADLs and functional mobility   Lives With Alone   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 )   ADL Assistance Independent   IADLs Needs assistance   Comments uses cane sometimes inside and cane outside    ADL   Eating Assistance 7  Independent   Grooming Assistance 5  [address] 4  Minimal Assistance    [address] 4  [address] 4  [address]  4  Minimal Assistance   Bed Mobility   Supine to Sit 5  Supervision   Sit to Supine 5  Supervision   Transfers   Sit to Stand 4  Minimal assistance   Stand to Sit 4  Minimal assistance   Functional Mobility   Functional Mobility 4  Minimal assistance   Additional Comments 10 feet with RW, SOB with activity    Additional items Rolling walker   Balance   Static Sitting Fair +   Dynamic Sitting Fair   Static Standing Fair   Dynamic Standing Fair -   Activity Tolerance   Activity Tolerance Patient limited by fatigue  (SOB, generalized weakness )   RUE Assessment   RUE Assessment WFL  (grossly 4-/5 MMT)   LUE Assessment   LUE Assessment WFL  (grossly 4-/5 MMT)   Cognition   Overall Cognitive Status WFL   Arousal/Participation Cooperative   Attention Within functional limits   Orientation Level Oriented X4   Following Commands Follows all commands and directions without difficulty   Assessment   Limitation Decreased ADL status; Decreased UE strength;Decreased Safe judgement during ADL;Decreased endurance;Decreased self-care trans;Decreased high-level ADLs  (decreased balance and mobility )   Prognosis Good   Assessment Patient evaluated by Occupational Therapy  Patient admitted with COPD exacerbation ([facility] [address] )  The patients occupational profile, medical and therapy history includes a extensive additional review of physical, cognitive, or psychosocial history related to current functional performance  Comorbidities affecting functional mobility and ADLS include: COPD, AAT deficiency, HTN, BPH, CLAYTON, centrilobular emphysema, generalized weakness, liver disease, lung mass, PE, CRF w hypoxia, neuropathy, pulmonary HTN  Prior to admission, patient was independent with functional mobility with cane outside, with and without inside , independent with ADLS and requiring assist for IADLS  The evaluation identifies the following performance deficits: weakness, impaired balance, decreased endurance, increased fall risk, new onset of impairment of functional mobility, decreased ADLS, decreased IADLS, pain, decreased activity tolerance, decreased safety awareness, impaired judgement, SOB upon exertion and decreased strength, that result in activity limitations and/or participation restrictions  This evaluation requires clinical decision making of high complexity, because the patient presents with comorbidites that affect occupational performance and required significant modification of tasks or assistance with consideration of multiple treatment options  The Barthel Index was used as a functional outcome tool presenting with a score of 50, indicating marked limitations of functional mobility and ADLS  Patient will benefit from skilled Occupational Therapy services to address above deficits and facilitate a safe return to prior level of function     Goals   Patient Goals go home   STG Time Frame (1-7 days)   Short Term Goal  Goals established to promote patient goal of going home:  Patient will increase standing tolerance to 3 minutes during ADL task to decrease assistance level and decrease fall risk; Patient will increase bed mobility to independent in preparation for ADLS and transfers; Patient will increase functional mobility to and from bathroom with rolling walker with supervision to increase performance with ADLS and to use a toilet; Patient will tolerate 10 minutes of UE ROM/strengthening to increase general activity tolerance and performance in ADLS/IADLS; Patient will improve functional activity tolerance to 10 minutes of sustained functional tasks to increase participation in basic self-care and decrease assistance level;  Patient will be able to to verbalize understanding and perform energy conservation/proper body mechanics during ADLS and functional mobility at least 75% of the time with minimal cueing to decrease signs of fatigue and increase stamina to return to prior level of function; Patient will increase dynamic sitting balance to fair+ to improve the ability to sit at edge of bed or on a chair for ADLS;  Patient will increase dynamic standing balance to fair to improve postural stability and decrease fall risk during standing ADLS and transfers      LTG Time Frame   (8-14 days)   Long Term Goal Patient will increase standing tolerance to 6 minutes during ADL task to decrease assistance level and decrease fall risk;  Patient will increase functional mobility to and from bathroom with rolling walker independently to increase performance with ADLS and to use a toilet; Patient will tolerate 20 minutes of UE ROM/strengthening to increase general activity tolerance and performance in ADLS/IADLS; Patient will improve functional activity tolerance to 20 minutes of sustained functional tasks to increase participation in basic self-care and decrease assistance level;  Patient will be able to to verbalize understanding and perform energy conservation/proper body mechanics during ADLS and functional mobility at least 90% of the time with minimalcueing to decrease signs of fatigue and increase stamina to return to prior level of function; Patient will increase dynamic sitting balance to good to improve the ability to sit at edge of bed or on a chair for ADLS;  Patient will increase dynamic standing balance to fair+ to improve postural stability and decrease fall risk during standing ADLS and transfers  Functional Transfer Goals   Pt Will Perform All Functional Transfers   (STG supervision LTG independent )   ADL Goals   Pt Will Perform Grooming Standing at sink  (STG supervision LTG independent )   Pt Will Perform Bathing   (STG supervision LTG independent )   Pt Will Perform LE Dressing   (STG supervision LTG independent )   Pt Will Perform Toileting   (STG supervision LTG independent )   Plan   Treatment Interventions ADL retraining;Functional transfer training;UE strengthening/ROM; Endurance training;Patient/family training;Equipment evaluation/education; Activityengagement; Compensatory technique education; Energy conservation   Goal Expiration Date 02/10/20   OT Frequency 3-5x/wk   Recommendation   OT Discharge Recommendation Short Term Rehab   Barthel Index   Feeding 10   Bathing 0   Grooming Score 0   Dressing Score 5   Bladder Score 10   Bowels Score 10   Toilet Use Score 5   Transfers (Bed/Chair) Score 10   Mobility (Level Surface) Score 0   Stairs Score 0   Barthel Index Score 50   Licensure   [place] License Number  Sandy Heriberto Lopez [address] 87 OTR/L [id number]

## 2020-01-27 NOTE — SOCIAL WORK
LOS 1 day  Patient is not a bundle or a 30 day readmission  SW met with patient to discuss discharge planning  Patient is alert and oriented x4  He lives alone in a one level home with 2-3 steps to enter  He has a RW and uses home O2, 2L NC used at home chronically, however is requiring more now  SW will monitor O2 needs  Patient drives, and was independent with ADL's prior to admission  Family is supportive  PCP is Dr Thierry Mandel  Preferred pharmacy is [facility] in [place], did not express difficulty affording medications  No LW or POA, did not want information at this time  Hx of STR at [address] and [facility]  HHC through [facility] and [facility] in the past     Therapy is recommending STR at discharge  SW discussed with patient and provided a list of rehab facilities  Patient agreeable to rehab however does not wish to return to [facility] or [facility]  Patient is reviewing list  SW will follow for choices  Discussed goals of making sure pt's needs are met upon discharge, [place] [facility] is offered, pt understands his health condition, medications and symptoms to watch for after returning home and pt is aware of any follow up appointments recommended by hospital physician

## 2020-01-28 ENCOUNTER — APPOINTMENT (INPATIENT)
Dept: PULMONOLOGY | Facility: HOSPITAL | Age: 75
DRG: 191 | End: 2020-01-28
Payer: MEDICARE

## 2020-01-28 ENCOUNTER — APPOINTMENT (INPATIENT)
Dept: RADIOLOGY | Facility: HOSPITAL | Age: 75
DRG: 191 | End: 2020-01-28
Payer: MEDICARE

## 2020-01-28 ENCOUNTER — EPISODE CHANGES (OUTPATIENT)
Dept: CASE MANAGEMENT | Facility: OTHER | Age: 75
End: 2020-01-28

## 2020-01-28 LAB
ALBUMIN SERPL BCP-MCNC: 3.5 G/DL (ref 3.5–5)
ALP SERPL-CCNC: 57 U/L (ref 46–116)
ALT SERPL W P-5'-P-CCNC: 38 U/L (ref 12–78)
ANION GAP SERPL CALCULATED.3IONS-SCNC: 10 MMOL/L (ref 4–13)
ARTERIAL PATENCY WRIST A: YES
AST SERPL W P-5'-P-CCNC: 24 U/L (ref 5–45)
BASE EXCESS BLDA CALC-SCNC: -0.4 MMOL/L
BASOPHILS # BLD AUTO: 0.01 THOUSANDS/ΜL (ref 0–0.1)
BASOPHILS NFR BLD AUTO: 0 % (ref 0–1)
BILIRUB SERPL-MCNC: 0.6 MG/DL (ref 0.2–1)
BODY TEMPERATURE: 97.8 DEGREES FEHRENHEIT
BUN SERPL-MCNC: 22 MG/DL (ref 5–25)
CALCIUM SERPL-MCNC: 8.1 MG/DL (ref 8.3–10.1)
CHLORIDE SERPL-SCNC: 104 MMOL/L (ref 100–108)
CO2 SERPL-SCNC: 26 MMOL/L (ref 21–32)
CREAT SERPL-MCNC: 1.34 MG/DL (ref 0.6–1.3)
EOSINOPHIL # BLD AUTO: 0 THOUSAND/ΜL (ref 0–0.61)
EOSINOPHIL NFR BLD AUTO: 0 % (ref 0–6)
ERYTHROCYTE [DISTWIDTH] IN BLOOD BY AUTOMATED COUNT: 14.6 % (ref 11.6–15.1)
GFR SERPL CREATININE-BSD FRML MDRD: 51 ML/MIN/1.73SQ M
GLUCOSE SERPL-MCNC: 133 MG/DL (ref 65–140)
HCO3 BLDA-SCNC: 23.8 MMOL/L (ref 22–28)
HCT VFR BLD AUTO: 34.2 % (ref 36.5–49.3)
HGB BLD-MCNC: 12.1 G/DL (ref 12–17)
IMM GRANULOCYTES # BLD AUTO: 0.1 THOUSAND/UL (ref 0–0.2)
IMM GRANULOCYTES NFR BLD AUTO: 1 % (ref 0–2)
LYMPHOCYTES # BLD AUTO: 0.93 THOUSANDS/ΜL (ref 0.6–4.47)
LYMPHOCYTES NFR BLD AUTO: 10 % (ref 14–44)
MAGNESIUM SERPL-MCNC: 2 MG/DL (ref 1.6–2.6)
MCH RBC QN AUTO: 33.5 PG (ref 26.8–34.3)
MCHC RBC AUTO-ENTMCNC: 35.4 G/DL (ref 31.4–37.4)
MCV RBC AUTO: 95 FL (ref 82–98)
MONOCYTES # BLD AUTO: 0.52 THOUSAND/ΜL (ref 0.17–1.22)
MONOCYTES NFR BLD AUTO: 6 % (ref 4–12)
NASAL CANNULA: 3
NEUTROPHILS # BLD AUTO: 7.76 THOUSANDS/ΜL (ref 1.85–7.62)
NEUTS SEG NFR BLD AUTO: 83 % (ref 43–75)
NRBC BLD AUTO-RTO: 0 /100 WBCS
O2 CT BLDA-SCNC: 18.8 ML/DL (ref 16–23)
OXYHGB MFR BLDA: 97.1 % (ref 94–97)
PCO2 BLDA: 37.7 MM HG (ref 36–44)
PCO2 TEMP ADJ BLDA: 37 MM HG (ref 36–44)
PH BLD: 7.42 [PH] (ref 7.35–7.45)
PH BLDA: 7.42 [PH] (ref 7.35–7.45)
PHOSPHATE SERPL-MCNC: 2.5 MG/DL (ref 2.3–4.1)
PLATELET # BLD AUTO: 190 THOUSANDS/UL (ref 149–390)
PMV BLD AUTO: 9.5 FL (ref 8.9–12.7)
PO2 BLD: 107.8 MM HG (ref 75–129)
PO2 BLDA: 110.2 MM HG (ref 75–129)
POTASSIUM SERPL-SCNC: 3.5 MMOL/L (ref 3.5–5.3)
PROT SERPL-MCNC: 6.4 G/DL (ref 6.4–8.2)
RBC # BLD AUTO: 3.61 MILLION/UL (ref 3.88–5.62)
SODIUM SERPL-SCNC: 140 MMOL/L (ref 136–145)
SPECIMEN SOURCE: ABNORMAL
WBC # BLD AUTO: 9.32 THOUSAND/UL (ref 4.31–10.16)

## 2020-01-28 PROCEDURE — 84100 ASSAY OF PHOSPHORUS: CPT | Performed by: STUDENT IN AN ORGANIZED HEALTH CARE EDUCATION/TRAINING PROGRAM

## 2020-01-28 PROCEDURE — 94060 EVALUATION OF WHEEZING: CPT | Performed by: INTERNAL MEDICINE

## 2020-01-28 PROCEDURE — 94760 N-INVAS EAR/PLS OXIMETRY 1: CPT

## 2020-01-28 PROCEDURE — 94640 AIRWAY INHALATION TREATMENT: CPT

## 2020-01-28 PROCEDURE — 99232 SBSQ HOSP IP/OBS MODERATE 35: CPT | Performed by: PHYSICIAN ASSISTANT

## 2020-01-28 PROCEDURE — 83735 ASSAY OF MAGNESIUM: CPT | Performed by: STUDENT IN AN ORGANIZED HEALTH CARE EDUCATION/TRAINING PROGRAM

## 2020-01-28 PROCEDURE — 82805 BLOOD GASES W/O2 SATURATION: CPT | Performed by: PHYSICIAN ASSISTANT

## 2020-01-28 PROCEDURE — 36600 WITHDRAWAL OF ARTERIAL BLOOD: CPT

## 2020-01-28 PROCEDURE — 94060 EVALUATION OF WHEEZING: CPT

## 2020-01-28 PROCEDURE — 99232 SBSQ HOSP IP/OBS MODERATE 35: CPT | Performed by: FAMILY MEDICINE

## 2020-01-28 PROCEDURE — 74220 X-RAY XM ESOPHAGUS 1CNTRST: CPT

## 2020-01-28 PROCEDURE — NC001 PR NO CHARGE: Performed by: PHYSICIAN ASSISTANT

## 2020-01-28 PROCEDURE — 80053 COMPREHEN METABOLIC PANEL: CPT | Performed by: STUDENT IN AN ORGANIZED HEALTH CARE EDUCATION/TRAINING PROGRAM

## 2020-01-28 PROCEDURE — 85025 COMPLETE CBC W/AUTO DIFF WBC: CPT | Performed by: STUDENT IN AN ORGANIZED HEALTH CARE EDUCATION/TRAINING PROGRAM

## 2020-01-28 RX ORDER — LANOLIN ALCOHOL/MO/W.PET/CERES
6 CREAM (GRAM) TOPICAL
Status: DISCONTINUED | OUTPATIENT
Start: 2020-01-28 | End: 2020-01-29

## 2020-01-28 RX ADMIN — GABAPENTIN 300 MG: 300 CAPSULE ORAL at 17:06

## 2020-01-28 RX ADMIN — HEPARIN SODIUM 5000 UNITS: 5000 INJECTION INTRAVENOUS; SUBCUTANEOUS at 14:05

## 2020-01-28 RX ADMIN — FORMOTEROL FUMARATE DIHYDRATE 20 MCG: 20 SOLUTION RESPIRATORY (INHALATION) at 08:07

## 2020-01-28 RX ADMIN — TAMSULOSIN HYDROCHLORIDE 0.4 MG: 0.4 CAPSULE ORAL at 08:22

## 2020-01-28 RX ADMIN — MELATONIN 6 MG: at 22:10

## 2020-01-28 RX ADMIN — PANTOPRAZOLE SODIUM 40 MG: 40 TABLET, DELAYED RELEASE ORAL at 08:22

## 2020-01-28 RX ADMIN — IPRATROPIUM BROMIDE AND ALBUTEROL SULFATE 3 ML: 2.5; .5 SOLUTION RESPIRATORY (INHALATION) at 05:06

## 2020-01-28 RX ADMIN — IPRATROPIUM BROMIDE AND ALBUTEROL SULFATE 3 ML: 2.5; .5 SOLUTION RESPIRATORY (INHALATION) at 19:24

## 2020-01-28 RX ADMIN — FAMOTIDINE 20 MG: 20 TABLET ORAL at 17:06

## 2020-01-28 RX ADMIN — METOCLOPRAMIDE 10 MG: 10 TABLET ORAL at 17:06

## 2020-01-28 RX ADMIN — BUDESONIDE 0.5 MG: 0.5 INHALANT RESPIRATORY (INHALATION) at 19:24

## 2020-01-28 RX ADMIN — AMLODIPINE BESYLATE 10 MG: 10 TABLET ORAL at 08:21

## 2020-01-28 RX ADMIN — BUSPIRONE HYDROCHLORIDE 10 MG: 10 TABLET ORAL at 17:07

## 2020-01-28 RX ADMIN — GABAPENTIN 300 MG: 300 CAPSULE ORAL at 22:10

## 2020-01-28 RX ADMIN — IPRATROPIUM BROMIDE AND ALBUTEROL SULFATE 3 ML: 2.5; .5 SOLUTION RESPIRATORY (INHALATION) at 11:26

## 2020-01-28 RX ADMIN — FAMOTIDINE 20 MG: 20 TABLET ORAL at 08:23

## 2020-01-28 RX ADMIN — FINASTERIDE 5 MG: 5 TABLET, FILM COATED ORAL at 08:22

## 2020-01-28 RX ADMIN — BUSPIRONE HYDROCHLORIDE 10 MG: 10 TABLET ORAL at 08:40

## 2020-01-28 RX ADMIN — GABAPENTIN 300 MG: 300 CAPSULE ORAL at 08:21

## 2020-01-28 RX ADMIN — HEPARIN SODIUM 5000 UNITS: 5000 INJECTION INTRAVENOUS; SUBCUTANEOUS at 22:10

## 2020-01-28 RX ADMIN — METHYLPREDNISOLONE SODIUM SUCCINATE 20 MG: 40 INJECTION, POWDER, FOR SOLUTION INTRAMUSCULAR; INTRAVENOUS at 05:44

## 2020-01-28 RX ADMIN — FORMOTEROL FUMARATE DIHYDRATE 20 MCG: 20 SOLUTION RESPIRATORY (INHALATION) at 19:24

## 2020-01-28 RX ADMIN — PANTOPRAZOLE SODIUM 40 MG: 40 TABLET, DELAYED RELEASE ORAL at 17:06

## 2020-01-28 RX ADMIN — SODIUM CHLORIDE 75 ML/HR: 0.9 INJECTION, SOLUTION INTRAVENOUS at 01:41

## 2020-01-28 RX ADMIN — IPRATROPIUM BROMIDE AND ALBUTEROL SULFATE 3 ML: 2.5; .5 SOLUTION RESPIRATORY (INHALATION) at 15:55

## 2020-01-28 RX ADMIN — HEPARIN SODIUM 5000 UNITS: 5000 INJECTION INTRAVENOUS; SUBCUTANEOUS at 05:44

## 2020-01-28 RX ADMIN — SODIUM CHLORIDE 75 ML/HR: 0.9 INJECTION, SOLUTION INTRAVENOUS at 18:27

## 2020-01-28 RX ADMIN — IPRATROPIUM BROMIDE AND ALBUTEROL SULFATE 3 ML: 2.5; .5 SOLUTION RESPIRATORY (INHALATION) at 01:25

## 2020-01-28 RX ADMIN — BUSPIRONE HYDROCHLORIDE 10 MG: 10 TABLET ORAL at 22:10

## 2020-01-28 RX ADMIN — IPRATROPIUM BROMIDE AND ALBUTEROL SULFATE 3 ML: 2.5; .5 SOLUTION RESPIRATORY (INHALATION) at 07:55

## 2020-01-28 RX ADMIN — BUDESONIDE 0.5 MG: 0.5 INHALANT RESPIRATORY (INHALATION) at 07:55

## 2020-01-28 NOTE — PROGRESS NOTES
Patient's daughter came out and told this RN he is talking and not making sense  RN went to evaluate patient  Patient states he feels funny and "feels like he needs to shoot someone and doesn't know why"  Called [facility] to come evaluate patient and they stated they would evaluate on rounds

## 2020-01-28 NOTE — PLAN OF CARE
Problem: Potential for Falls  Goal: Patient will remain free of falls  Description  INTERVENTIONS:  - Assess patient frequently for physical needs  -  Identify cognitive and physical deficits and behaviors that affect risk of falls    -  [place] fall precautions as indicated by assessment   - Educate patient/family on patient safety including physical limitations  - Instruct patient to call for assistance with activity based on assessment  - Modify environment to reduce risk of injury  - Consider OT/PT consult to assist with strengthening/mobility  Outcome: Progressing     Problem: RESPIRATORY - ADULT  Goal: Achieves optimal ventilation and oxygenation  Description  INTERVENTIONS:  - Assess for changes in respiratory status  - Assess for changes in mentation and behavior  - Position to facilitate oxygenation and minimize respiratory effort  - Oxygen administered by appropriate delivery if ordered  - Initiate smoking cessation education as indicated  - Encourage broncho-pulmonary hygiene including cough, deep breathe, Incentive Spirometry  - Assess the need for suctioning and aspirate as needed  - Assess and instruct to report SOB or any respiratory difficulty  - Respiratory Therapy support as indicated  Outcome: Progressing

## 2020-01-28 NOTE — PROGRESS NOTES
[facility] Practice Progress Note - Niesha Payan 75 y o  male MRN: [id number]    Unit/Bed#: 2 [facility] [address] Encounter: [id number]    Patient 74 yo male admitted for COPD exacerbation and severe GERD  Will possibly get colonoscopy and EGD this admission    Assessment/Plan:  * COPD exacerbation [facility])  Assessment & Plan  Patient with a history of AAT deficiency and centrolobular emphysema presenting with SOB of 2-3 days  On home O2 2L and reports that he has had to increase O2 to 5L  125mg solumedrol given in ED   Trop neg  · Continue home Pulmicort   · Continue home Performist BID  · Continue home Buspar 10mg TID (respiration stimulator)  · Patient receives weekly Zemaira injections, last injection was Monday  Unable to order this as a hospital medication  · Pulmonology consult - decrease steroids, likely will need bronchoscopy   · Duoneb q4hrs  · IV solumedrol 20mg q8hrs    Peripheral neuropathy  Assessment & Plan  Continue gabapentin 300 mg TID    Insomnia  Assessment & Plan  Continue zolpidem 10mg     Weakness generalized  Assessment & Plan  Patient has been experiencing generalized weakness for the past 2 weeks  · OT PT evaluation- STR rec  CM aware    Centrilobular emphysema ([address] )  Assessment & Plan  See COPD management    CLAYTON (acute kidney injury) ([facility] [address] )  Assessment & Plan  Cr 1 87 on admission  BL is 1 0  · Hold nephrotoxic agents   · NS 75 ml/hr  · Improved; Cr on 1/27 1 59  · switched SQL to heparin     Chronic respiratory failure with hypoxia (HCC)  Assessment & Plan  Due to COPD requiring continuous home oxygen at 2 L     BPH (benign prostatic hyperplasia)  Assessment & Plan  Last PSA was 0 5 mg in 12/2018  · continue finasteride 5mg and tamsulosin 0 4mg    Essential hypertension  Assessment & Plan  BP on admission 161/71   Home medications include amlodipine 10mg and Dyazide  · Continue amlodipine 10mg  · Hold Dyazide in light of CLAYTON     Gastroesophageal reflux disease  Assessment & Plan  Patient has a long history of stomach upset, heart burn, and diarrhea  He follows with GI outpatient  Per GI notes, he was scheduled to get EGD and colonoscopy in October 2019 but patient canceled the procedures  He complains of heartburn on admission   · Continue home meds: pepcid 20mg BID, protonix 40mg BID, and Reglan BID   · GI consult:  · Patient will need EGD and colonoscopy once he is cleared by pulmonary  · Barium swallow to evaluate for motility issues        Subjective:   Patient seen and examined at bedside  Patient given extra dose of Solumedrol last PM around 10 as he was having difficultly breathing  He is currently on 3L o2 NC which is baseline  He notes feeling "funny" this morning  He did receive ambien last night  Objective:     Vitals: Blood pressure 130/67, pulse 74, temperature 98 4 °F (36 9 °C), temperature source Oral, resp  rate 18, height 6' 5" (1 956 m), weight 91 kg (200 lb 9 9 oz), SpO2 97 %  ,Body mass index is 23 79 kg/m²    Wt Readings from Last 3 Encounters:   01/26/20 91 kg (200 lb 9 9 oz)   01/20/20 88 5 kg (195 lb)   01/13/20 92 5 kg (204 lb)       Intake/Output Summary (Last 24 hours) at 1/28/2020 0945  Last data filed at 1/28/2020 0141  Gross per 24 hour   Intake 960 ml   Output 1025 ml   Net -65 ml       Physical Exam:   General: A&Ox3, NAD  Heart: RRR S1 S2  Lungs: decreased air movement in all lung fields, no wheezing     Recent Results (from the past 24 hour(s))   Comprehensive metabolic panel    Collection Time: 01/28/20  5:31 AM   Result Value Ref Range    Sodium 140 136 - 145 mmol/L    Potassium 3 5 3 5 - 5 3 mmol/L    Chloride 104 100 - 108 mmol/L    CO2 26 21 - 32 mmol/L    ANION GAP 10 4 - 13 mmol/L    BUN 22 5 - 25 mg/dL    Creatinine 1 34 (H) 0 60 - 1 30 mg/dL    Glucose 133 65 - 140 mg/dL    Calcium 8 1 (L) 8 3 - 10 1 mg/dL    AST 24 5 - 45 U/L    ALT 38 12 - 78 U/L    Alkaline Phosphatase 57 46 - 116 U/L    Total Protein 6 4 6 4 - 8 2 g/dL    Albumin 3 5 3 5 - 5 0 g/dL Total Bilirubin 0 60 0 20 - 1 00 mg/dL    eGFR 51 ml/min/1 73sq m   Magnesium    Collection Time: 01/28/20  5:31 AM   Result Value Ref Range    Magnesium 2 0 1 6 - 2 6 mg/dL   CBC and differential    Collection Time: 01/28/20  5:31 AM   Result Value Ref Range    WBC 9 32 4 31 - 10 16 Thousand/uL    RBC 3 61 (L) 3 88 - 5 62 Million/uL    Hemoglobin 12 1 12 0 - 17 0 g/dL    Hematocrit 34 2 (L) 36 5 - 49 3 %    MCV 95 82 - 98 fL    MCH 33 5 26 8 - 34 3 pg    MCHC 35 4 31 4 - 37 4 g/dL    RDW 14 6 11 6 - 15 1 %    MPV 9 5 8 9 - 12 7 fL    Platelets 904 555 - 190 Thousands/uL    nRBC 0 /100 WBCs    Neutrophils Relative 83 (H) 43 - 75 %    Immat GRANS % 1 0 - 2 %    Lymphocytes Relative 10 (L) 14 - 44 %    Monocytes Relative 6 4 - 12 %    Eosinophils Relative 0 0 - 6 %    Basophils Relative 0 0 - 1 %    Neutrophils Absolute 7 76 (H) 1 85 - 7 62 Thousands/µL    Immature Grans Absolute 0 10 0 00 - 0 20 Thousand/uL    Lymphocytes Absolute 0 93 0 60 - 4 47 Thousands/µL    Monocytes Absolute 0 52 0 17 - 1 22 Thousand/µL    Eosinophils Absolute 0 00 0 00 - 0 61 Thousand/µL    Basophils Absolute 0 01 0 00 - 0 10 Thousands/µL   Phosphorus    Collection Time: 01/28/20  5:31 AM   Result Value Ref Range    Phosphorus 2 5 2 3 - 4 1 mg/dL       Current Facility-Administered Medications   Medication Dose Route Frequency Provider Last Rate Last Dose    amLODIPine (NORVASC) tablet 10 mg  10 mg Oral Daily Roseann Viotto, DO   10 mg at 01/28/20 0821    budesonide (PULMICORT) inhalation solution 0 5 mg  0 5 mg Nebulization BID Roseann Viotto, DO   0 5 mg at 01/28/20 0755    busPIRone (BUSPAR) tablet 10 mg  10 mg Oral TID Kinza Ioana, DO   10 mg at 01/28/20 0840    famotidine (PEPCID) tablet 20 mg  20 mg Oral BID Roseann Cobianotto, DO   20 mg at 01/28/20 8919    finasteride (PROSCAR) tablet 5 mg  5 mg Oral QAM Roseann Mabry DO   5 mg at 01/28/20 5176    formoterol (PERFOROMIST) nebulizer solution 20 mcg  20 mcg Nebulization BID Roseann Viotto, DO   20 mcg at 01/28/20 8735    gabapentin (NEURONTIN) capsule 300 mg  300 mg Oral TID Amanda Morgan DO   300 mg at 01/28/20 9253    heparin (porcine) subcutaneous injection 5,000 Units  5,000 Units Subcutaneous [facility] Roseann Cobianotto, DO   5,000 Units at 01/28/20 0544    ipratropium-albuterol (DUO-NEB) 0 5-2 5 mg/3 mL inhalation solution 3 mL  3 mL Nebulization Q4H Roseann Viotto, DO   3 mL at 01/28/20 0755    methylPREDNISolone sodium succinate (Solu-MEDROL) injection 20 mg  20 mg Intravenous [facility] Lizeth Rubalcava MD   20 mg at 01/28/20 0544    metoclopramide (REGLAN) tablet 10 mg  10 mg Oral BID AC Roseann Viotto, DO   10 mg at 01/27/20 1706    pantoprazole (PROTONIX) EC tablet 40 mg  40 mg Oral BID Roseann Viotto, DO   40 mg at 01/28/20 0912    sodium chloride 0 9 % infusion  75 mL/hr Intravenous Continuous Roseann Aranzaotto, DO 75 mL/hr at 01/28/20 0141 75 mL/hr at 01/28/20 0141    tamsulosin (FLOMAX) capsule 0 4 mg  0 4 mg Oral Daily Roseann Viotto, DO   0 4 mg at 01/28/20 3981    zolpidem (AMBIEN) tablet 10 mg  10 mg Oral HS Roseann Viotto, DO   10 mg at 01/27/20 2119       Invasive Devices     Peripheral Intravenous Line            Peripheral IV 01/27/20 Left Arm less than 1 day                Lab, Imaging and other studies: I have personally reviewed pertinent reports      VTE Pharmacologic Prophylaxis: Heparin  VTE Mechanical Prophylaxis: sequential compression device    Amanda Morgan DO

## 2020-01-28 NOTE — PROGRESS NOTES
Thom Ramirez has severe COPD / with chronic hypoxemic respiratory failure and is oxygen dependent on 3-5 L cannula at home  He has progressive dyspnea with clinical decline as he has been hospitalized several times recently with suspicion of worsening baseline confirmed by most recent bedside spirometry  Olivia Urbina has chronic respiratory failure due to COPD and will need mechanical ventilator  ABG results as below  We have considered BiPAP and he is not a candidate for this  NIV is needed to decrease the work of breathing and improve pulmonary status  Without this respiratory support, serious harm or death could occur  An NIV would decrease in hospital readmission rate and improve overall quality of life  Specifically requesting life 2000 noninvasive ventilator        Blood gas, arterial   Order: [id number]   Status:  Final result   Visible to patient:  No (Not Released)   Next appt:  02/04/2020 at 09:45 AM in Family Medicine Homero Lindsay MD)    Ref Range & Units 1/28/20 1107   pH, Arterial 7 350 - 7 450 7 418    PH ART TC 7 350 - 7 450 7 424    pCO2, Arterial 36 0 - 44 0 mm Hg 37 7    PCO2 (TC) Arterial 36 0 - 44 0 mm Hg 37 0    pO2, Arterial 75 0 - 129 0 mm Hg 110 2    PO2 (TC) Arterial 75 0 - 129 0 mm Hg 107 8    HCO3, Arterial 22 0 - 28 0 mmol/L 23 8    Base Excess, Arterial mmol/L -0 4    O2 Content, Arterial 16 0 - 23 0 mL/dL 18 8    O2 HGB,Arterial  94 0 - 97 0 % 97 1High     SOURCE  Radial, Right    [place] TEST  Yes    Temperature Degrees Fehrenheit 97 8    Nasal Cannula  3

## 2020-01-28 NOTE — SOCIAL WORK
LOS - 2 days    CPR2  Bundle-COPD    SW following to assist with DCP  STR placement is planned  Referrals had been made to [facility], [facility] and [facility] met with pt to review plan  Also discussed enrollment in Medicare Bundle Program   Provided Bundle Program Notification Letter  Provided information on Medicare Bundle Program, shared list of preferred rehab providers and discussed ELOS  Pt has been clinically accepted by all facilities  Currently [facility] is indicating bed availability  Pt and daughter aware  Pt and daughter confirmed that preference is [facility] at this time  Plan is for pt to transfer to [facility]  when discharged  SW will follow to monitor progress and assist with transfer when ready

## 2020-01-28 NOTE — PROGRESS NOTES
Progress Note - Pulmonary   Niesha Payan 75 y o  male MRN: [id number]  Unit/Bed#: [address] Encounter: [id number]  Code Status: Level 1 - Full Code    Mikalya Davison is a 75 y o  Past Medical History:   Diagnosis Date    Anesthesia complication     Difficult to wake up    Arthritis     BPH (benign prostatic hyperplasia)     urinary frequency    Cancer (HCC)     basal cell neck, face    COPD (chronic obstructive pulmonary disease) (HCC)     COPD exacerbation (HCC) 12/29/2019    Full dentures     Hiatal hernia     History of methicillin resistant staphylococcus aureus (MRSA)     10/11/2018 MRSA (nares) positive    Hypertension     controlled    Irritable bowel syndrome     Kidney stone     at least 7 episodes    Liver disease     Alpha 1- enzyme deficiency - diagnosed 2002  has been on weekly replacement therapy since then    Pulmonary emphysema ([address] )     1/25/15  FEV1 - 2 45 liters or 59% of predicted    RSV infection 12/2017    Wears glasses     for driving only     Has known severe emphysematous COPD October 2019 FEV1 1 78 L or 44% chronically managed with Perforomist, Pulmicort DuoNeb,  chronic oxygen dependence of 2 L, 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 diagnosed in 2002, HTN, hyperlipidemia, GERD on both Pepcid and Protonix, BPH  History of PE February 2018 post 6 months of Eliquis treatment, off anticoagulation  Small chronic thrombus  Right upper lobe lung nodule, left lower lobe nodule  Additionally, he has followed up for PET CT scanning of his pulmonary nodules recently w/ negative SUV uptake, however, in the face of nodular growth of right sided nodules  He did have positive uptake in the colon which is currently under investigation by GI with consideration for colonoscopy once stable from respiratory standpoint      Admitted for acute on chronic dyspnea    Assessment/Plan:    Altered Mental Status:  -patient is hallucinating and delusional  -possibly combination of steroids and Ambien which was started last night  -discontinue steroids  -discontinue Ambien  -melatonin for sleep tonight     Chronic hypoxemic respiratory failure with Dyspnea:  -patient is typically on 3 L NC resting and up to 5 L w/ activity   -patient is at his baseline  -always dyspneic with minimal exertion  -recent NL nuc med stress  -likely to benefit from ventilator > will submit Rx to [facility] [facility] [employer] 2000 ventilator  -needs ABG and bedside spirometry this admission to qualify > ordered     Severe emphysematous COPD:  -FEV 1 1 78L or 44% October 2019 Spirometry  -I believe this is simply a worsening of his baseline and deconditioning  -d/c steroids today given lack of improvement or worsening except for worsening mental status     Alpha-1 antitrypsin deficiency:  -is on weekly Zemaira injections  -held while hospitalized     Lung nodules:  Historically stable  Will follow up in the outpatient setting  Likely to need navigational bronchoscopy  CT guided biopsy likely too risky     Positive abnormal PET scan:  -with positive SUV uptake in the colon  -gastroenterology following  -plan is for colonoscopy  -his clinical risk is graded as minimal for postoperative complications > low risk , conservatively, however, I would treat him as a moderate risk  -okay to proceed with procedure with standard precautions for COPD and hypoxemic patient  -patient should be wheeze free on the day of procedure     D/C and Follow up Needs:  Adjustment of Oxygen if necessary  Approval for NIV  Possible Adjunct therapy I e  [facility]    D/C and Follow up Needs:  ______________________________________________________________________    Subjective: Pt seen and examined at bedside  Confused, hallucinating, delirious this morning  Had Ambien last night      Smoking history: 60 pack year smoking history   Occupational history:  deferred  Environmental History:no environmental factors or PETs  Travel history:No recent travel > does not travel from home routinely  Respiratory History:Severe Emphysematous COPD / Alpha 1 Antitrypsin  Oxygen Therapy:3 L rest / 5 L amb w/ POC  PAP Therapy:No CPAP or BIPAP  DME Company:[employer] Insurance:Medicare / [place] [employer]  Has attended Pulmonary Rehab    Tele Events:     Vitals:   Temp:  [97 5 °F (36 4 °C)-98 9 °F (37 2 °C)] 98 4 °F (36 9 °C)  HR:  [72-79] 74  Resp:  [15-20] 18  BP: (130-142)/(67-72) 130/67  Weight (last 2 days)     Date/Time   Weight    01/26/20 18:01:26   91 (200 62)    01/26/20 1319   91 (200 62)            Oxygen Therapy  SpO2: 97 %  O2 Flow Rate (L/min): 3 L/min    IV Infusions:    sodium chloride 75 mL/hr Last Rate: 75 mL/hr (01/28/20 0141)       Nutrition:        Diet Orders   (From admission, onward)             Start     Ordered    01/28/20 0000  Diet Clear Liquid  Diet effective now     Question Answer Comment   Diet Type Clear Liquid    RD to adjust diet per protocol?  Yes        01/27/20 1715    01/26/20 1847  Room Service  Once     Question:  Type of Service  Answer:  Room Service-Appropriate    01/26/20 1846                Ins/Outs:   I/O       01/26 0701 - 01/27 0700 01/27 0701 - 01/28 0700 01/28 0701 - 01/29 0700    I V  (mL/kg)  960 (10 5)     Total Intake(mL/kg)  960 (10 5)     Urine (mL/kg/hr)  1025 (0 5)     Total Output  1025     Net  -65                  Lines/Drains:  Invasive Devices     Peripheral Intravenous Line            Peripheral IV 01/27/20 Left Arm less than 1 day                 Active medications:  Scheduled Meds:  Current Facility-Administered Medications:  amLODIPine 10 mg Oral Daily Roseann Viotto, DO    budesonide 0 5 mg Nebulization BID Roseann Viotto, DO    busPIRone 10 mg Oral TID Deryl Churches, DO    famotidine 20 mg Oral BID Roseann Viotto, DO    finasteride 5 mg Oral QAM Roseann Viotto, DO    formoterol 20 mcg Nebulization BID Roseann Viotto, DO    gabapentin 300 mg Oral TID Kary Mabry, DO    heparin (porcine) 5,000 Units Subcutaneous Q8H [address] Roseann Viotto, DO    ipratropium-albuterol 3 mL Nebulization Q4H Roseann Viotto, DO    methylPREDNISolone sodium succinate 20 mg Intravenous [facility] Fernando Paris MD    metoclopramide 10 mg Oral BID AC Roseann Viotto, DO    pantoprazole 40 mg Oral BID Roseann Viotto, DO    sodium chloride 75 mL/hr Intravenous Continuous Roseann Viotto, DO Last Rate: 75 mL/hr (01/28/20 0141)   tamsulosin 0 4 mg Oral Daily Roseann Viotto, DO    zolpidem 10 mg Oral HS Roseann Viotto, DO      PRN Meds:   ____________________________________________________________________    Physical Exam   Constitutional: He appears well-developed  HENT:   Head: Normocephalic and atraumatic  Eyes: Pupils are equal, round, and reactive to light  Conjunctivae are normal    Neck: Normal range of motion  Neck supple  Cardiovascular: Normal rate, regular rhythm and normal heart sounds  Exam reveals no gallop and no friction rub  No murmur heard  Pulmonary/Chest: Effort normal  No accessory muscle usage  No tachypnea  No respiratory distress  He has decreased breath sounds in the right upper field, the right middle field, the right lower field, the left upper field, the left middle field and the left lower field  He has no wheezes  He has no rhonchi  He has no rales  He exhibits no tenderness  Moderate to severely decreased breath sounds, this is the patient's baseline, currently 98% resting on 3 L cannula   Abdominal: Soft  Bowel sounds are normal    Musculoskeletal: Normal range of motion  He exhibits no edema  Neurological: He is alert  Skin: Skin is warm and dry  Psychiatric: He has a normal mood and affect  His speech is tangential  He is actively hallucinating     Confused     ____________________________________________________________________    Labs:   CBC: Results from last 7 days   Lab Units 01/28/20  0531 01/27/20  0611 01/26/20  1335   WBC Thousand/uL 9 32 7 54 6 80   HEMOGLOBIN g/dL 12 1 13 6 13 4 HEMATOCRIT % 34 2* 39 0 38 4   MCV fL 95 94 95   PLATELETS Thousands/uL 190 187 200     CMP: Results from last 7 days   Lab Units 01/28/20  0531 01/27/20  0513 01/26/20  1335   POTASSIUM mmol/L 3 5 3 8 3 6   CHLORIDE mmol/L 104 99* 101   CO2 mmol/L 26 24 31   BUN mg/dL 22 20 17   CREATININE mg/dL 1 34* 1 59* 1 87*   CALCIUM mg/dL 8 1* 8 9 10 2*   AST U/L 24 36 30   ALT U/L 38 42 40   ALK PHOS U/L 57 65 68   EGFR ml/min/1 73sq m 51 42 34     No components found for: ABG    Magnesium:   Results from last 7 days   Lab Units 01/28/20  0531   MAGNESIUM mg/dL 2 0     Phosphorous:   Results from last 7 days   Lab Units 01/28/20  0531   PHOSPHORUS mg/dL 2 5     Troponin:   Results from last 7 days   Lab Units 01/26/20  1335   TROPONIN I ng/mL 0 02     PT/INR:   Results from last 7 days   Lab Units 01/26/20  1334   PTT seconds 23   INR  1 01     Lactic Acid:     BNP:   Results from last 7 days   Lab Units 01/26/20  1335   NT-PRO BNP pg/mL 120     TSH:       Imaging:   XR chest 1 view portable   Final Result by Kelly Huston MD (01/27 0915)      No acute cardiopulmonary disease  Workstation performed: [id number]         FL barium swallow    (Results Pending)       Micro: Lab Results   Component Value Date    BLOODCX No Growth After 5 Days  12/29/2017    BLOODCX No Growth After 5 Days   12/29/2017    WOUNDCULT 3+ Growth of Staphylococcus aureus (A) 06/12/2019    MRSACULTURE  11/11/2019     No Methicillin Resistant Staphlyococcus aureus (MRSA) isolated            Invalid input(s): Joanna Petties

## 2020-01-29 ENCOUNTER — APPOINTMENT (INPATIENT)
Dept: RADIOLOGY | Facility: HOSPITAL | Age: 75
DRG: 191 | End: 2020-01-29
Payer: MEDICARE

## 2020-01-29 LAB
ANION GAP SERPL CALCULATED.3IONS-SCNC: 8 MMOL/L (ref 4–13)
BUN SERPL-MCNC: 20 MG/DL (ref 5–25)
CALCIUM SERPL-MCNC: 7.7 MG/DL (ref 8.3–10.1)
CHLORIDE SERPL-SCNC: 106 MMOL/L (ref 100–108)
CO2 SERPL-SCNC: 28 MMOL/L (ref 21–32)
CREAT SERPL-MCNC: 1.13 MG/DL (ref 0.6–1.3)
D DIMER PPP FEU-MCNC: 1.09 UG/ML FEU (ref 0.19–0.52)
GFR SERPL CREATININE-BSD FRML MDRD: 63 ML/MIN/1.73SQ M
GLUCOSE SERPL-MCNC: 90 MG/DL (ref 65–140)
MAGNESIUM SERPL-MCNC: 2 MG/DL (ref 1.6–2.6)
POTASSIUM SERPL-SCNC: 3.3 MMOL/L (ref 3.5–5.3)
SODIUM SERPL-SCNC: 142 MMOL/L (ref 136–145)

## 2020-01-29 PROCEDURE — 71275 CT ANGIOGRAPHY CHEST: CPT

## 2020-01-29 PROCEDURE — 97535 SELF CARE MNGMENT TRAINING: CPT

## 2020-01-29 PROCEDURE — 99232 SBSQ HOSP IP/OBS MODERATE 35: CPT | Performed by: FAMILY MEDICINE

## 2020-01-29 PROCEDURE — 83735 ASSAY OF MAGNESIUM: CPT | Performed by: STUDENT IN AN ORGANIZED HEALTH CARE EDUCATION/TRAINING PROGRAM

## 2020-01-29 PROCEDURE — 94640 AIRWAY INHALATION TREATMENT: CPT

## 2020-01-29 PROCEDURE — 97110 THERAPEUTIC EXERCISES: CPT

## 2020-01-29 PROCEDURE — 85379 FIBRIN DEGRADATION QUANT: CPT | Performed by: INTERNAL MEDICINE

## 2020-01-29 PROCEDURE — 99232 SBSQ HOSP IP/OBS MODERATE 35: CPT | Performed by: INTERNAL MEDICINE

## 2020-01-29 PROCEDURE — 99232 SBSQ HOSP IP/OBS MODERATE 35: CPT | Performed by: PHYSICIAN ASSISTANT

## 2020-01-29 PROCEDURE — 94760 N-INVAS EAR/PLS OXIMETRY 1: CPT

## 2020-01-29 PROCEDURE — 80048 BASIC METABOLIC PNL TOTAL CA: CPT | Performed by: STUDENT IN AN ORGANIZED HEALTH CARE EDUCATION/TRAINING PROGRAM

## 2020-01-29 RX ORDER — ONDANSETRON 2 MG/ML
4 INJECTION INTRAMUSCULAR; INTRAVENOUS EVERY 4 HOURS PRN
Status: DISCONTINUED | OUTPATIENT
Start: 2020-01-29 | End: 2020-01-30 | Stop reason: HOSPADM

## 2020-01-29 RX ORDER — POLYETHYLENE GLYCOL 3350 17 G/17G
238 POWDER, FOR SOLUTION ORAL ONCE
Status: COMPLETED | OUTPATIENT
Start: 2020-01-29 | End: 2020-01-29

## 2020-01-29 RX ORDER — ZOLPIDEM TARTRATE 5 MG/1
10 TABLET ORAL
Status: DISCONTINUED | OUTPATIENT
Start: 2020-01-29 | End: 2020-01-30 | Stop reason: HOSPADM

## 2020-01-29 RX ORDER — ONDANSETRON 4 MG/1
4 TABLET, ORALLY DISINTEGRATING ORAL EVERY 6 HOURS PRN
Qty: 20 TABLET | Refills: 0 | OUTPATIENT
Start: 2020-01-29

## 2020-01-29 RX ORDER — POTASSIUM CHLORIDE 20 MEQ/1
20 TABLET, EXTENDED RELEASE ORAL ONCE
Status: COMPLETED | OUTPATIENT
Start: 2020-01-29 | End: 2020-01-29

## 2020-01-29 RX ADMIN — TAMSULOSIN HYDROCHLORIDE 0.4 MG: 0.4 CAPSULE ORAL at 09:17

## 2020-01-29 RX ADMIN — BUDESONIDE 0.5 MG: 0.5 INHALANT RESPIRATORY (INHALATION) at 07:39

## 2020-01-29 RX ADMIN — BUDESONIDE 0.5 MG: 0.5 INHALANT RESPIRATORY (INHALATION) at 19:41

## 2020-01-29 RX ADMIN — IOHEXOL 85 ML: 350 INJECTION, SOLUTION INTRAVENOUS at 09:58

## 2020-01-29 RX ADMIN — FINASTERIDE 5 MG: 5 TABLET, FILM COATED ORAL at 09:17

## 2020-01-29 RX ADMIN — BUSPIRONE HYDROCHLORIDE 10 MG: 10 TABLET ORAL at 09:16

## 2020-01-29 RX ADMIN — IPRATROPIUM BROMIDE AND ALBUTEROL SULFATE 3 ML: 2.5; .5 SOLUTION RESPIRATORY (INHALATION) at 04:35

## 2020-01-29 RX ADMIN — FORMOTEROL FUMARATE DIHYDRATE 20 MCG: 20 SOLUTION RESPIRATORY (INHALATION) at 07:39

## 2020-01-29 RX ADMIN — PANTOPRAZOLE SODIUM 40 MG: 40 TABLET, DELAYED RELEASE ORAL at 18:37

## 2020-01-29 RX ADMIN — METOCLOPRAMIDE 10 MG: 10 TABLET ORAL at 09:16

## 2020-01-29 RX ADMIN — FAMOTIDINE 20 MG: 20 TABLET ORAL at 18:37

## 2020-01-29 RX ADMIN — HEPARIN SODIUM 5000 UNITS: 5000 INJECTION INTRAVENOUS; SUBCUTANEOUS at 14:53

## 2020-01-29 RX ADMIN — POLYETHYLENE GLYCOL 3350 238 G: 17 POWDER, FOR SOLUTION ORAL at 19:46

## 2020-01-29 RX ADMIN — GABAPENTIN 300 MG: 300 CAPSULE ORAL at 20:17

## 2020-01-29 RX ADMIN — GABAPENTIN 300 MG: 300 CAPSULE ORAL at 09:17

## 2020-01-29 RX ADMIN — IPRATROPIUM BROMIDE AND ALBUTEROL SULFATE 3 ML: 2.5; .5 SOLUTION RESPIRATORY (INHALATION) at 15:18

## 2020-01-29 RX ADMIN — IPRATROPIUM BROMIDE AND ALBUTEROL SULFATE 3 ML: 2.5; .5 SOLUTION RESPIRATORY (INHALATION) at 11:25

## 2020-01-29 RX ADMIN — HEPARIN SODIUM 5000 UNITS: 5000 INJECTION INTRAVENOUS; SUBCUTANEOUS at 05:50

## 2020-01-29 RX ADMIN — AMLODIPINE BESYLATE 10 MG: 10 TABLET ORAL at 09:16

## 2020-01-29 RX ADMIN — PANTOPRAZOLE SODIUM 40 MG: 40 TABLET, DELAYED RELEASE ORAL at 09:16

## 2020-01-29 RX ADMIN — BUSPIRONE HYDROCHLORIDE 10 MG: 10 TABLET ORAL at 20:17

## 2020-01-29 RX ADMIN — FAMOTIDINE 20 MG: 20 TABLET ORAL at 09:17

## 2020-01-29 RX ADMIN — GABAPENTIN 300 MG: 300 CAPSULE ORAL at 16:19

## 2020-01-29 RX ADMIN — IPRATROPIUM BROMIDE AND ALBUTEROL SULFATE 3 ML: 2.5; .5 SOLUTION RESPIRATORY (INHALATION) at 07:38

## 2020-01-29 RX ADMIN — BUSPIRONE HYDROCHLORIDE 10 MG: 10 TABLET ORAL at 16:19

## 2020-01-29 RX ADMIN — POTASSIUM CHLORIDE 20 MEQ: 1500 TABLET, EXTENDED RELEASE ORAL at 22:37

## 2020-01-29 RX ADMIN — POTASSIUM CHLORIDE 20 MEQ: 1500 TABLET, EXTENDED RELEASE ORAL at 09:23

## 2020-01-29 RX ADMIN — HEPARIN SODIUM 5000 UNITS: 5000 INJECTION INTRAVENOUS; SUBCUTANEOUS at 22:26

## 2020-01-29 RX ADMIN — ONDANSETRON 4 MG: 2 INJECTION INTRAMUSCULAR; INTRAVENOUS at 21:08

## 2020-01-29 RX ADMIN — METOCLOPRAMIDE 10 MG: 10 TABLET ORAL at 16:19

## 2020-01-29 RX ADMIN — IPRATROPIUM BROMIDE AND ALBUTEROL SULFATE 3 ML: 2.5; .5 SOLUTION RESPIRATORY (INHALATION) at 19:41

## 2020-01-29 NOTE — PROGRESS NOTES
[facility] Practice Progress Note - Cameron Cali 75 y o  male MRN: [id number]    Unit/Bed#: 2 [facility] [address] Encounter: [id number]    Patient is a 34-year-old male admitted for COPD exacerbation and GI upset with severe GERD  Colonoscopy scheduled for this afternoon  Assessment/Plan:  * COPD with acute exacerbation [facility])  Assessment & Plan  Patient with a history of AAT deficiency and centrolobular emphysema presenting with SOB of 2-3 days  On home O2 2L and reports that he has had to increase O2 to 5L  125mg solumedrol given in ED   Trop neg  · Continue home Pulmicort   · Continue home Performist BID  · Continue home Buspar 10mg TID (respiration stimulator)  · Patient receives weekly Zemaira injections, last injection was Monday  Unable to order this as a hospital medication  · Pulmonology consult - decrease steroids, likely will need bronchoscopy   · Duoneb q4hrs  · IV steroids discontinued on 01/28    Peripheral neuropathy  Assessment & Plan  Continue gabapentin 300 mg TID    Insomnia  Assessment & Plan  Continue zolpidem 10mg     Weakness generalized  Assessment & Plan  Patient has been experiencing generalized weakness for the past 2 weeks  · OT PT evaluation- STR rec  CM aware    Centrilobular emphysema ([address] )  Assessment & Plan  See COPD management    CLAYTON (acute kidney injury) ([facility] [address] )  Assessment & Plan  Cr 1 87 on admission  BL is 1 0  · Hold nephrotoxic agents   · NS 75 ml/hr  · Improved; Cr on 1/27 1 59  · switched SQL to heparin     Chronic respiratory failure with hypoxia (HCC)  Assessment & Plan  Due to COPD requiring continuous home oxygen at 2 L     BPH (benign prostatic hyperplasia)  Assessment & Plan  Last PSA was 0 5 mg in 12/2018  · continue finasteride 5mg and tamsulosin 0 4mg    Essential hypertension  Assessment & Plan  BP on admission 161/71   Home medications include amlodipine 10mg and Dyazide  · Continue amlodipine 10mg  · Hold Dyazide in light of CLAYTON     Gastroesophageal reflux disease  Assessment & Plan  Patient has a long history of stomach upset, heart burn, and diarrhea  He follows with GI outpatient  Per GI notes, he was scheduled to get EGD and colonoscopy in October 2019 but patient canceled the procedures  He complains of heartburn on admission   · Continue home meds: pepcid 20mg BID, protonix 40mg BID, and Reglan BID   · GI consult:  · Patient will need EGD and colonoscopy once he is cleared by pulmonary  · Barium swallow: Mild esophageal dysmotility, Moderate gastroesophageal reflux without evidence for mild reflux esophagitis, Small hiatal hernia  · Colonoscopy 1/29        Subjective:     Patient seen examined at bedside  He did not sleep as he did not get his Ambien last night as it was discontinued for altered mental status episode yesterday morning  His breathing is at baseline  He continues to have GI upset although this is improved since he has been on his clear liquid diet  Objective:     Vitals: Blood pressure 153/76, pulse 80, temperature 97 5 °F (36 4 °C), resp  rate (!) 26, height 6' 5" (1 956 m), weight 91 kg (200 lb 9 9 oz), SpO2 97 %  ,Body mass index is 23 79 kg/m²    Wt Readings from Last 3 Encounters:   01/26/20 91 kg (200 lb 9 9 oz)   01/20/20 88 5 kg (195 lb)   01/13/20 92 5 kg (204 lb)       Intake/Output Summary (Last 24 hours) at 1/29/2020 1306  Last data filed at 1/29/2020 0304  Gross per 24 hour   Intake 800 ml   Output 400 ml   Net 400 ml       Physical Exam:   General:  NAD, a and O x3  Heart:  RRR with S1-S2  Lungs:  Decreased air movement in all lung fields, no wheezing  Extremities:  No edema    Recent Results (from the past 24 hour(s))   Magnesium    Collection Time: 01/29/20  5:16 AM   Result Value Ref Range    Magnesium 2 0 1 6 - 2 6 mg/dL   D-dimer, quantitative    Collection Time: 01/29/20  5:16 AM   Result Value Ref Range    D-Dimer, Quant 1 09 (H) 0 19 - 0 52 ug/ml FEU   Basic metabolic panel    Collection Time: 01/29/20  5:16 AM   Result Value Ref Range    Sodium 142 136 - 145 mmol/L    Potassium 3 3 (L) 3 5 - 5 3 mmol/L    Chloride 106 100 - 108 mmol/L    CO2 28 21 - 32 mmol/L    ANION GAP 8 4 - 13 mmol/L    BUN 20 5 - 25 mg/dL    Creatinine 1 13 0 60 - 1 30 mg/dL    Glucose 90 65 - 140 mg/dL    Calcium 7 7 (L) 8 3 - 10 1 mg/dL    eGFR 63 ml/min/1 73sq m       Current Facility-Administered Medications   Medication Dose Route Frequency Provider Last Rate Last Dose    amLODIPine (NORVASC) tablet 10 mg  10 mg Oral Daily Roseann Viotto, DO   10 mg at 01/29/20 0916    budesonide (PULMICORT) inhalation solution 0 5 mg  0 5 mg Nebulization BID Roseann Viotto, DO   0 5 mg at 01/29/20 0739    busPIRone (BUSPAR) tablet 10 mg  10 mg Oral TID Roxana Shore, DO   10 mg at 01/29/20 0916    famotidine (PEPCID) tablet 20 mg  20 mg Oral BID Roseann Viotto, DO   20 mg at 01/29/20 8014    finasteride (PROSCAR) tablet 5 mg  5 mg Oral QAM Roseann Viotto, DO   5 mg at 01/29/20 0917    formoterol (PERFOROMIST) nebulizer solution 20 mcg  20 mcg Nebulization BID Roseann Viotto, DO   20 mcg at 01/29/20 0739    gabapentin (NEURONTIN) capsule 300 mg  300 mg Oral TID Roxana Shore, DO   300 mg at 01/29/20 2876    heparin (porcine) subcutaneous injection 5,000 Units  5,000 Units Subcutaneous [facility] Roseann Viotto, DO   5,000 Units at 01/29/20 0550    ipratropium-albuterol (DUO-NEB) 0 5-2 5 mg/3 mL inhalation solution 3 mL  3 mL Nebulization Q4H Roseann Viotto, DO   3 mL at 01/29/20 1125    melatonin tablet 6 mg  6 mg Oral HS Gracy Hsieh PA-C   6 mg at 01/28/20 2210    metoclopramide (REGLAN) tablet 10 mg  10 mg Oral BID AC Roseann Viotto, DO   10 mg at 01/29/20 0916    pantoprazole (PROTONIX) EC tablet 40 mg  40 mg Oral BID Roseann Mabry DO   40 mg at 01/29/20 0916    tamsulosin (FLOMAX) capsule 0 4 mg  0 4 mg Oral Daily Roseann Mabry DO   0 4 mg at 01/29/20 0917    zolpidem (AMBIEN) tablet 10 mg  10 mg Oral HS PRN Roxana Cladera DO           Invasive Devices     Peripheral Intravenous Line            Peripheral IV 01/27/20 Left Arm 1 day    Peripheral IV 01/29/20 Left;Proximal;Ventral (anterior) Forearm less than 1 day                Lab, Imaging and other studies: I have personally reviewed pertinent reports      VTE Pharmacologic Prophylaxis: Heparin  VTE Mechanical Prophylaxis: sequential compression device    [employer], DO

## 2020-01-29 NOTE — PROGRESS NOTES
Progress Note - Cameron Cali 75 y o  male MRN: [id number]    Unit/Bed#: 2 [address] Encounter: [id number]        Subjective:   Patient denies any abdominal pain or chest pain, reports to be tolerating clear liquids, reports he had a bowel movement recently after having gone a few days without one  Objective:     Vitals: Blood pressure 153/76, pulse 80, temperature 97 5 °F (36 4 °C), resp  rate (!) 26, height 6' 5" (1 956 m), weight 91 kg (200 lb 9 9 oz), SpO2 97 %  ,Body mass index is 23 79 kg/m²  Intake/Output Summary (Last 24 hours) at 1/29/2020 1243  Last data filed at 1/29/2020 0304  Gross per 24 hour   Intake 800 ml   Output 400 ml   Net 400 ml       Physical Exam:   General appearance: alert, appears stated age and cooperative  Lungs:  Diminished breath sounds bilaterally, no labored breathing/accessory muscle use  Currently on nasal cannula  Heart: regular rate and rhythm, S1, S2 normal, no murmur, click, rub or gallop  Abdomen: soft, non-tender; bowel sounds normal; no masses,  no organomegaly  Extremities: no edema    Invasive Devices     Peripheral Intravenous Line            Peripheral IV 01/27/20 Left Arm 1 day    Peripheral IV 01/29/20 Left;Proximal;Ventral (anterior) Forearm less than 1 day                Lab, Imaging and other studies: I have personally reviewed pertinent reports      Admission on 01/26/2020   Component Date Value    WBC 01/26/2020 6 80     RBC 01/26/2020 4 04     Hemoglobin 01/26/2020 13 4     Hematocrit 01/26/2020 38 4     MCV 01/26/2020 95     MCH 01/26/2020 33 2     MCHC 01/26/2020 34 9     RDW 01/26/2020 14 6     MPV 01/26/2020 9 1     Platelets 26/36/5947 200     nRBC 01/26/2020 0     Neutrophils Relative 01/26/2020 54     Immat GRANS % 01/26/2020 1     Lymphocytes Relative 01/26/2020 29     Monocytes Relative 01/26/2020 12     Eosinophils Relative 01/26/2020 3     Basophils Relative 01/26/2020 1     Neutrophils Absolute 01/26/2020 3 71     Immature Grans Absolute 01/26/2020 0 04     Lymphocytes Absolute 01/26/2020 1 97     Monocytes Absolute 01/26/2020 0 78     Eosinophils Absolute 01/26/2020 0 23     Basophils Absolute 01/26/2020 0 07     Protime 01/26/2020 10 9     INR 01/26/2020 1 01     PTT 01/26/2020 23     Sodium 01/26/2020 140     Potassium 01/26/2020 3 6     Chloride 01/26/2020 101     CO2 01/26/2020 31     ANION GAP 01/26/2020 8     BUN 01/26/2020 17     Creatinine 01/26/2020 1 87*    Glucose 01/26/2020 111     Calcium 01/26/2020 10 2*    AST 01/26/2020 30     ALT 01/26/2020 40     Alkaline Phosphatase 01/26/2020 68     Total Protein 01/26/2020 7 0     Albumin 01/26/2020 3 9     Total Bilirubin 01/26/2020 1 20*    eGFR 01/26/2020 34     Magnesium 01/26/2020 2 0     Troponin I 01/26/2020 0 02     NT-proBNP 01/26/2020 120     Sodium 01/27/2020 135*    Potassium 01/27/2020 3 8     Chloride 01/27/2020 99*    CO2 01/27/2020 24     ANION GAP 01/27/2020 12     BUN 01/27/2020 20     Creatinine 01/27/2020 1 59*    Glucose 01/27/2020 154*    Calcium 01/27/2020 8 9     AST 01/27/2020 36     ALT 01/27/2020 42     Alkaline Phosphatase 01/27/2020 65     Total Protein 01/27/2020 6 9     Albumin 01/27/2020 3 5     Total Bilirubin 01/27/2020 1 00     eGFR 01/27/2020 42     Magnesium 01/27/2020 1 8     Phosphorus 01/27/2020 2 4     WBC 01/27/2020 7 54     RBC 01/27/2020 4 16     Hemoglobin 01/27/2020 13 6     Hematocrit 01/27/2020 39 0     MCV 01/27/2020 94     MCH 01/27/2020 32 7     MCHC 01/27/2020 34 9     RDW 01/27/2020 14 4     MPV 01/27/2020 8 5*    Platelets 71/11/6847 187     nRBC 01/27/2020 0     Neutrophils Relative 01/27/2020 83*    Immat GRANS % 01/27/2020 1     Lymphocytes Relative 01/27/2020 14     Monocytes Relative 01/27/2020 2*    Eosinophils Relative 01/27/2020 0     Basophils Relative 01/27/2020 0     Neutrophils Absolute 01/27/2020 6 26     Immature Grans Absolute 01/27/2020 0 09     Lymphocytes Absolute 01/27/2020 1 02     Monocytes Absolute 01/27/2020 0 15*    Eosinophils Absolute 01/27/2020 0 00     Basophils Absolute 01/27/2020 0 02     Ventricular Rate 01/26/2020 91     Atrial Rate 01/26/2020 91     DC Interval 01/26/2020 154     QRSD Interval 01/26/2020 104     QT Interval 01/26/2020 360     QTC Interval 01/26/2020 442     QRS Axis 01/26/2020 35     T Wave Axis 01/26/2020 -7     Sodium 01/28/2020 140     Potassium 01/28/2020 3 5     Chloride 01/28/2020 104     CO2 01/28/2020 26     ANION GAP 01/28/2020 10     BUN 01/28/2020 22     Creatinine 01/28/2020 1 34*    Glucose 01/28/2020 133     Calcium 01/28/2020 8 1*    AST 01/28/2020 24     ALT 01/28/2020 38     Alkaline Phosphatase 01/28/2020 57     Total Protein 01/28/2020 6 4     Albumin 01/28/2020 3 5     Total Bilirubin 01/28/2020 0 60     eGFR 01/28/2020 51     Magnesium 01/28/2020 2 0     WBC 01/28/2020 9 32     RBC 01/28/2020 3 61*    Hemoglobin 01/28/2020 12 1     Hematocrit 01/28/2020 34 2*    MCV 01/28/2020 95     MCH 01/28/2020 33 5     MCHC 01/28/2020 35 4     RDW 01/28/2020 14 6     MPV 01/28/2020 9 5     Platelets 56/49/0412 190     nRBC 01/28/2020 0     Neutrophils Relative 01/28/2020 83*    Immat GRANS % 01/28/2020 1     Lymphocytes Relative 01/28/2020 10*    Monocytes Relative 01/28/2020 6     Eosinophils Relative 01/28/2020 0     Basophils Relative 01/28/2020 0     Neutrophils Absolute 01/28/2020 7 76*    Immature Grans Absolute 01/28/2020 0 10     Lymphocytes Absolute 01/28/2020 0 93     Monocytes Absolute 01/28/2020 0 52     Eosinophils Absolute 01/28/2020 0 00     Basophils Absolute 01/28/2020 0 01     Phosphorus 01/28/2020 2 5     pH, Arterial 01/28/2020 7  [address] ART TC 01/28/2020 7 424     pCO2, Arterial 01/28/2020 37 7     PCO2 (TC) Arterial 01/28/2020 37 0     pO2, Arterial 01/28/2020 110 2     PO2 (TC) Arterial 01/28/2020 107 8     HCO3, Arterial 01/28/2020 23 8     Base Excess, Arterial 01/28/2020 -0 4     O2 Content, Arterial 01/28/2020 18 8     O2 HGB,Arterial  01/28/2020 97 1*    SOURCE 01/28/2020 Radial, Right     FLORENCE TEST 01/28/2020 Yes     Temperature 01/28/2020 97 8     Nasal Cannula 01/28/2020 3     Magnesium 01/29/2020 2 0     D-Dimer, Quant 01/29/2020 1 09*    Sodium 01/29/2020 142     Potassium 01/29/2020 3 3*    Chloride 01/29/2020 106     CO2 01/29/2020 28     ANION GAP 01/29/2020 8     BUN 01/29/2020 20     Creatinine 01/29/2020 1 13     Glucose 01/29/2020 90     Calcium 01/29/2020 7 7*    eGFR 01/29/2020 63      Results for Elena Naik (MRN [id number]) as of 1/29/2020 12:43   Ref   Range 1/27/2020 05:13 1/27/2020 06:11 1/28/2020 05:31 1/28/2020 11:07 1/29/2020 05:16   FLORENCE TEST Unknown    Yes    Temperature Latest Units: Degrees Fehrenheit    97 8    pH, Arterial Latest Ref Range: 7 350 - 7 450     7 418    PH ART TC Latest Ref Range: 7 350 - 7 450     7 424    PCO2 (TC) Arterial Latest Ref Range: 36 0 - 44 0 mm Hg    37 0    pCO2, Arterial Latest Ref Range: 36 0 - 44 0 mm Hg    37 7    pO2, Arterial Latest Ref Range: 75 0 - 129 0 mm Hg    110 2    HCO3, Arterial Latest Ref Range: 22 0 - 28 0 mmol/L    23 8    Base Excess, Arterial Latest Units: mmol/L    -0 4    O2 Content, Arterial Latest Ref Range: 16 0 - 23 0 mL/dL    18 8    O2 HGB,Arterial Latest Ref Range: 94 0 - 97 0 %    97 1 (H)    PO2 at Temp Latest Ref Range: 75 0 - 129 0 mm Hg    107 8    ABG SOURCE Unknown    Radial, Right    NASAL CANNULA Unknown    3    Sodium Latest Ref Range: 136 - 145 mmol/L 135 (L)  140  142   Potassium Latest Ref Range: 3 5 - 5 3 mmol/L 3 8  3 5  3 3 (L)   Chloride Latest Ref Range: 100 - 108 mmol/L 99 (L)  104  106   CO2 Latest Ref Range: 21 - 32 mmol/L 24  26  28   Anion Gap Latest Ref Range: 4 - 13 mmol/L 12  10  8   BUN Latest Ref Range: 5 - 25 mg/dL 20  22  20   Creatinine Latest Ref Range: 0 60 - 1 30 mg/dL 1 59 (H)  1 34 (H) 1 13   Glucose, Random Latest Ref Range: 65 - 140 mg/dL 154 (H)  133  90   Calcium Latest Ref Range: 8 3 - 10 1 mg/dL 8 9  8 1 (L)  7 7 (L)   AST Latest Ref Range: 5 - 45 U/L 36  24     ALT Latest Ref Range: 12 - 78 U/L 42  38     Alkaline Phosphatase Latest Ref Range: 46 - 116 U/L 65  57     Total Protein Latest Ref Range: 6 4 - 8 2 g/dL 6 9  6 4     Albumin Latest Ref Range: 3 5 - 5 0 g/dL 3 5  3 5     TOTAL BILIRUBIN Latest Ref Range: 0 20 - 1 00 mg/dL 1 00  0 60     eGFR Latest Units: ml/min/1 73sq m 42  51  63   Phosphorus Latest Ref Range: 2 3 - 4 1 mg/dL 2 4  2 5     Magnesium Latest Ref Range: 1 6 - 2 6 mg/dL 1 8  2 0  2 0   WBC Latest Ref Range: 4 31 - 10 16 Thousand/uL  7 54 9 32     Red Blood Cell Count Latest Ref Range: 3 88 - 5 62 Million/uL  4 16 3 61 (L)     Hemoglobin Latest Ref Range: 12 0 - 17 0 g/dL  13 6 12 1     HCT Latest Ref Range: 36 5 - 49 3 %  39 0 34 2 (L)     MCV Latest Ref Range: 82 - 98 fL  94 95     MCH Latest Ref Range: 26 8 - 34 3 pg  32 7 33 5     MCHC Latest Ref Range: 31 4 - 37 4 g/dL  34 9 35 4     RDW Latest Ref Range: 11 6 - 15 1 %  14 4 14 6     Platelet Count Latest Ref Range: 149 - 390 Thousands/uL  187 190     MPV Latest Ref Range: 8 9 - 12 7 fL  8 5 (L) 9 5     nRBC Latest Units: /100 WBCs  0 0     Neutrophils % Latest Ref Range: 43 - 75 %  83 (H) 83 (H)     Immat GRANS % Latest Ref Range: 0 - 2 %  1 1     Lymphocytes Relative Latest Ref Range: 14 - 44 %  14 10 (L)     Monocytes Relative Latest Ref Range: 4 - 12 %  2 (L) 6     Eosinophils Latest Ref Range: 0 - 6 %  0 0     Basophils Relative Latest Ref Range: 0 - 1 %  0 0     Immature Grans Absolute Latest Ref Range: 0 00 - 0 20 Thousand/uL  0 09 0 10     Absolute Neutrophils Latest Ref Range: 1 85 - 7 62 Thousands/µL  6 26 7 76 (H)     Lymphocytes Absolute Latest Ref Range: 0 60 - 4 47 Thousands/µL  1 02 0 93     Absolute Monocytes Latest Ref Range: 0 17 - 1 22 Thousand/µL  0 15 (L) 0 52     Absolute Eosinophils Latest Ref Range: 0 00 - 0 61 Thousand/µL  0 00 0 00     Basophils Absolute Latest Ref Range: 0 00 - 0 10 Thousands/µL  0 02 0 01     D-Dimer, Quant Latest Ref Range: 0 19 - 0 52 ug/ml FEU     1 09 (H)         Assessment/Plan:    1  Abnormal PET scan with FDG uptake in the left side of the colon, along with history of adenomatous polyps on previous colonoscopy several years ago, and enlarging lung lesion of uncertain etiology; rule out underlying colorectal malignancy    - plan for colonoscopy for further evaluation; I spoke with Pau Figueroa PA-C with pulmonology; patient's pulmonary status currently appears to be optimized accounting for his underlying severe emphysema  - this should be done in the operating room, I did explain to patient that he would be at elevated risk for procedure given his underlying pulmonary disease, and may need to be intubated  - clear liquid diet today, prep this afternoon, NPO after midnight      2  Chronic diarrhea, appears most likely functional although could represent microscopic colitis, he did not have any random biopsies done during colonoscopy several years ago    - again, will assess at the time of colonoscopy      3    Dysphagia and severe refractory GERD despite twice daily PPI therapy, appears to be some component of dysmotility    - continue twice daily PPI, consider Carafate  - pursue EGD at the same time as colonoscopy, in the interest of also ruling out other gastrointestinal malignancy which could be related to enlarging lung nodule

## 2020-01-29 NOTE — PROGRESS NOTES
Progress Note - Pulmonary   Gopi Books 75 y o  male MRN: [id number]  Unit/Bed#: [address] Encounter: [id number]  Code Status: Level 1 - Full Code    Ileana Rodriguez is a 75 y o  Past Medical History:   Diagnosis Date    Anesthesia complication     Difficult to wake up    Arthritis     BPH (benign prostatic hyperplasia)     urinary frequency    Cancer (HCC)     basal cell neck, face    COPD (chronic obstructive pulmonary disease) (HCC)     COPD exacerbation (HCC) 12/29/2019    Full dentures     Hiatal hernia     History of methicillin resistant staphylococcus aureus (MRSA)     10/11/2018 MRSA (nares) positive    Hypertension     controlled    Irritable bowel syndrome     Kidney stone     at least 7 episodes    Liver disease     Alpha 1- enzyme deficiency - diagnosed 2002   has been on weekly replacement therapy since then    Pulmonary emphysema ([facility] [address] )     1/25/15  FEV1 - 2 45 liters or 59% of predicted    RSV infection 12/2017    Wears glasses     for driving only       Has known severe emphysematous COPD October 2019 FEV1 1 78 L or 44% chronically managed with Perforomist, Pulmicort DuoNeb,  chronic oxygen dependence of 2 L, 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 diagnosed in 2002, HTN, hyperlipidemia, GERD on both Pepcid and Protonix, BPH   History of PE February 2018 post 6 months of Eliquis treatment, off anticoagulation   Small chronic thrombus   Right upper lobe lung nodule, left lower lobe nodule      Additionally, he has followed up for PET CT scanning of his pulmonary nodules recently w/ negative SUV uptake, however, in the face of nodular growth of right sided nodules   He did have positive uptake in the colon which is currently under investigation by GI with consideration for colonoscopy once stable from respiratory standpoint      Admitted for acute on chronic dyspnea     Assessment/Plan:     Altered Mental Status:  -resolved delusions and hallucinations  -ongoing monitoring  -remains off steroids     Chronic hypoxemic respiratory failure with Dyspnea:  -patient is typically on 3 L NC resting and up to 5 L w/ activity as per outpatient notation   -patient is at his baseline  -always dyspneic with minimal exertion  -recent NL nuc med stress  -Nicole Telles has chronic respiratory failure due to COPD and will need mechanical ventilator  ABG results as below  We have considered BiPAP and he is not a candidate for this  NIV is needed to decrease the work of breathing and improve pulmonary status  Without this respiratory support, serious harm or death could occur  An NIV would decrease in hospital readmission rate and improve overall quality of life    Specifically requesting life 2000 noninvasive ventilator  -authorization has been obtained from [email or web address] and Life 2000 to be delivered to patient's home s/p STR  -they have already been in contact with the patient    Blood gas, arterial   Order: [id number]   Status:  Final result   Visible to patient:  No (Not Released)   Next appt:  02/04/2020 at 09:45 AM in Family Medicine Álvaro Joseph MD)     Ref Range & Units 1/28/20 1107   pH, Arterial 7 350 - 7 450 7 418    PH ART TC 7 350 - 7 450 7 424    pCO2, Arterial 36 0 - 44 0 mm Hg 37 7    PCO2 (TC) Arterial 36 0 - 44 0 mm Hg 37 0    pO2, Arterial 75 0 - 129 0 mm Hg 110 2    PO2 (TC) Arterial 75 0 - 129 0 mm Hg 107 8    HCO3, Arterial 22 0 - 28 0 mmol/L 23 8    Base Excess, Arterial mmol/L -0 4    O2 Content, Arterial 16 0 - 23 0 mL/dL 18 8    O2 HGB,Arterial  94 0 - 97 0 % 97 1High     SOURCE   Radial, Right    FLORENCE TEST   Yes    Temperature Degrees Fehrenheit 97 8    Nasal Cannula   3            Severe emphysematous COPD:  -FEV 1 1 78L or 44% October 2019 Spirometry  -I believe this is simply a worsening of his baseline and deconditioning  -there is no evidence of acute COPD exacerbation as this patient was admitted on his baseline oxygen therapy with minimal to -his clinical course remains at his baseline off of steroids which were rapidly weaned to off yesterday  -at this time the patient is clinically stable   -currently no wheezing     Alpha-1 antitrypsin deficiency:  -is on weekly Zemaira injections  -held while hospitalized     Lung nodules:  Historically stable  Will follow up in the outpatient setting  Likely to need navigational bronchoscopy  CT guided biopsy likely too risky      Positive abnormal PET scan:  -with positive SUV uptake in the colon  -gastroenterology following  -plan is for colonoscopy 1/30  -his clinical risk is graded as minimal for postoperative complications > low risk , conservatively, however, I would treat him as a moderate risk  -okay to proceed with procedure with standard precautions for COPD and chronically hypoxemic patient     D/C and Follow up Needs:  Adjustment of Oxygen if necessary  Approval for NIV > via [place] > CM   Possible Adjunct therapy I e  Daliresp  ______________________________________________________________________    Subjective: Pt seen and examined at bedside  No new issues  Patient inquiring on time of prep and what time colonoscopy would be tomorrow  Feels weak  Otherwise no significant changes  CT PE study without acute findings      Smoking history: 60 pack year smoking history   Occupational history:  deferred  Environmental History:no environmental factors or PETs  Travel history:No recent travel > does not travel from home routinely  Respiratory History:Severe Emphysematous COPD / Alpha 1 Antitrypsin Deficiency  Oxygen Therapy:3 L rest / 5 L amb w/ POC  PAP Therapy:No CPAP or BIPAP  DME Company:[employer]  Rx Insurance:Medicare / [employer]  Has attended Pulmonary Rehab    Tele Events:     Vitals:   Temp:  [97 3 °F (36 3 °C)-98 °F (36 7 °C)] 97 3 °F (36 3 °C)  HR:  [63-80] 72  Resp:  [16-26] 16  BP: (136-168)/(65-85) 136/65  Weight (last 2 days)     None        Oxygen Therapy  SpO2: 99 %  O2 Flow Rate (L/min): 3 L/min    IV Infusions:       Nutrition:        Diet Orders   (From admission, onward)             Start     Ordered    01/30/20 0001  Diet NPO  Diet effective midnight     Comments:  Can advance to clear liquids after barium swallow   Question Answer Comment   Diet Type NPO    RD to adjust diet per protocol?  Yes        01/29/20 1301    01/26/20 1847  Room Service  Once     Question:  Type of Service  Answer:  Room Service-Appropriate    01/26/20 1846                Ins/Outs:   I/O       01/27 0701 - 01/28 0700 01/28 0701 - 01/29 0700 01/29 0701 - 01/30 0700    I V  (mL/kg) 960 (10 5) 800 (8 8)     Total Intake(mL/kg) 960 (10 5) 800 (8 8)     Urine (mL/kg/hr) 1025 (0 5) 900 (0 4)     Total Output 1025 900     Net -65 -100                  Lines/Drains:  Invasive Devices     Peripheral Intravenous Line            Peripheral IV 01/27/20 Left Arm 1 day    Peripheral IV 01/29/20 Left;Proximal;Ventral (anterior) Forearm less than 1 day                 Active medications:  Scheduled Meds:  Current Facility-Administered Medications:  amLODIPine 10 mg Oral Daily Roseann Mabry, DO   budesonide 0 5 mg Nebulization BID PixelPlay, DO   busPIRone 10 mg Oral TID PixelPlay, DO   famotidine 20 mg Oral BID Roseanndionne Mabry, DO   finasteride 5 mg Oral QAM Roseann Mabry, DO   formoterol 20 mcg Nebulization BID PixelPlay, DO   gabapentin 300 mg Oral TID PixelPlay, DO   heparin (porcine) 5,000 Units Subcutaneous Q8H [address] Roseann Viotto, DO   ipratropium-albuterol 3 mL Nebulization Q4H Roseann Mabry, DO   melatonin 6 mg Oral HS Karson Servin PA-C   metoclopramide 10 mg Oral BID AC Roseann Viyueo, DO   pantoprazole 40 mg Oral BID Deisyonsus Margaret Mabry, DO   tamsulosin 0 4 mg Oral Daily Roseann Clotilde, DO   zolpidem 10 mg Oral HS PRN Roseann Mabry, DO     PRN Meds:  zolpidem 10 mg HS PRN     ____________________________________________________________________    Physical Exam   Constitutional: He is oriented to person, place, and time  He appears well-developed  HENT:   Head: Normocephalic and atraumatic  Eyes: Pupils are equal, round, and reactive to light  Conjunctivae are normal    Neck: Normal range of motion  Neck supple  Cardiovascular: Normal rate, regular rhythm and normal heart sounds  Exam reveals no gallop and no friction rub  No murmur heard  Pulmonary/Chest: Effort normal  No accessory muscle usage  No tachypnea  No respiratory distress  He has decreased breath sounds in the right upper field, the right middle field, the right lower field, the left upper field, the left middle field and the left lower field  He has no wheezes  He has no rhonchi  He has no rales  He exhibits no tenderness  Moderately diminished breath sounds consistent with the patient's baseline    98% SpO2 resting on 3 L in bed   Abdominal: Soft  Bowel sounds are normal    Musculoskeletal: Normal range of motion  He exhibits no edema  Neurological: He is alert and oriented to person, place, and time  Skin: Skin is warm and dry     Psychiatric: He has a normal mood and affect      ____________________________________________________________________    Labs:   CBC: Results from last 7 days   Lab Units 01/28/20  0531 01/27/20  0611 01/26/20  1335   WBC Thousand/uL 9 32 7 54 6 80   HEMOGLOBIN g/dL 12 1 13 6 13 4   HEMATOCRIT % 34 2* 39 0 38 4   MCV fL 95 94 95   PLATELETS Thousands/uL 190 187 200     CMP: Results from last 7 days   Lab Units 01/29/20  0516 01/28/20  0531 01/27/20  0513 01/26/20  1335   POTASSIUM mmol/L 3 3* 3 5 3 8 3 6   CHLORIDE mmol/L 106 104 99* 101   CO2 mmol/L 28 26 24 31   BUN mg/dL 20 22 20 17   CREATININE mg/dL 1 13 1 34* 1 59* 1 87*   CALCIUM mg/dL 7 7* 8 1* 8 9 10 2*   AST U/L  --  24 36 30   ALT U/L  --  38 42 40   ALK PHOS U/L  --  57 65 68   EGFR ml/min/1 73sq m 63 51 42 34     No components found for: ABG    Magnesium:   Results from last 7 days   Lab Units 01/29/20  0516   MAGNESIUM mg/dL 2 0     Phosphorous:   Results from last 7 days   Lab Units 01/28/20  0531   PHOSPHORUS mg/dL 2 5     Troponin:   Results from last 7 days   Lab Units 01/26/20  1335   TROPONIN I ng/mL 0 02     PT/INR:   Results from last 7 days   Lab Units 01/26/20  1334   PTT seconds 23   INR  1 01     Lactic Acid:     BNP:   Results from last 7 days   Lab Units 01/26/20  1335   NT-PRO BNP pg/mL 120     TSH:       Imaging:   CTA chest pe study   Final Result by Gaby Bowie DO (01/29 1055)      1  Apparent slight enlargement of spiculated left lower lobe 10 mm nodule  Although this did not demonstrate FDG avidity on recent PET/CT, this remains suspicious for malignancy  Nonemergent cardiothoracic surgical consultation advised if not    previously performed  At minimum, follow-up CT chest in 3 months is advised  2   No acute pulmonary embolus to the segmental level  No change in chronic left lower lobe pulmonary embolus  3   COPD  New 6 mm tubular opacity in the anterior left apex, presumably inflammatory pseudonodule  This can be reevaluated at follow-up imaging  The study was marked in epic for follow-up  Workstation performed: [id number]         FL barium swallow   Final Result by Rony Martínez MD (01/28 1701)         1  Mild esophageal dysmotility  2   Moderate gastroesophageal reflux without evidence for mild reflux esophagitis  3   Small hiatal hernia  Workstation performed: [id number]         XR chest 1 view portable   Final Result by Justino Chaney MD (01/27 0915)      No acute cardiopulmonary disease  Workstation performed: [id number]             Micro: Lab Results   Component Value Date    BLOODCX No Growth After 5 Days  12/29/2017    BLOODCX No Growth After 5 Days   12/29/2017    WOUNDCULT 3+ Growth of Staphylococcus aureus (A) 06/12/2019    MRSACULTURE  11/11/2019     No Methicillin Resistant Staphlyococcus aureus (MRSA) isolated            Invalid input(s): Hany Dobsonet

## 2020-01-29 NOTE — PLAN OF CARE
Problem: OCCUPATIONAL THERAPY ADULT  Goal: Performs self-care activities at highest level of function for planned discharge setting  See evaluation for individualized goals  Outcome: Progressing  Note:   Limitation: Decreased ADL status, Decreased UE strength, Decreased Safe judgement during ADL, Decreased endurance, Decreased self-care trans, Decreased high-level ADLs(decreased balance and mobility )  Prognosis: Good  Assessment: Patient seen for occupational therapy treatment today  Patient limited by increased WOB with functional activities  SpO2 maintained >95% on 3L O2 via NC throughout session however patient with reports of feeling SOB, visibily increased WOB especially with short distance ambulation  Patient will continue to benefit from skilled OT services in order to maximize functional independence and prepare for safe discharge   Continue OT per POC     OT Discharge Recommendation: Short Term Rehab

## 2020-01-29 NOTE — OCCUPATIONAL THERAPY NOTE
OT Treatment Note       01/29/20 1600   Restrictions/Precautions   Other Precautions O2;Fall Risk   Pain Assessment   Pain Assessment No/denies pain   Pain Score No Pain   ADL   Grooming Assistance 5  Supervision/Setup   Grooming Deficit Wash/dry hands; Wash/dry face  (Standing supported to sink)   LB Dressing Assistance 4  Minimal Assistance   LB Dressing Deficit Don/doff R sock; Don/doff L sock  (While seated EOB)   Toileting Assistance  5  Supervision/Setup   Toileting Deficit   (Simulated toileting in bathroom)   Bed Mobility   Supine to Sit 5  Supervision   Sit to Supine 5  Supervision   Additional items Increased time required   Transfers   Sit to Stand 4  Minimal assistance   Stand to Sit 4  Minimal assistance   Additional items Verbal cues   Stand pivot 4  Minimal assistance   Functional Mobility   Functional Mobility 4  Minimal assistance   Additional Comments Patient ambulated short household distance in room to/from bathroom with use of SPC and min assist   Toilet Transfers   Toilet Transfer Type To and from   Toilet Transfer to Standard toilet   Toilet Transfer Technique Ambulating   Toilet Transfers Minimal assistance   Toilet Transfers Comments With SPC, min VCs for safe transfer technique   Therapeutic Exercise - ROM   UE-ROM Yes   ROM- Right Upper Extremities   R Shoulder AROM; Flexion;ABduction   R Elbow AROM;Elbow flexion;Elbow extension   R Wrist AROM; Wrist flexion;Wrist extension   R Hand AROM; Thumb; Index finger; Long finger;Ring finger;Little finger   R Position Seated  (EOB)   R Weight/Reps/Sets 10 reps x 2   RUE ROM Comment Increased rest breaks due to fatigue   ROM - Left Upper Extremities    L Shoulder AROM; Flexion;ABduction   L Elbow AROM;Elbow flexion;Elbow extension   L Wrist AROM; Wrist flexion;Wrist extension   L Hand AROM; Thumb; Index finger; Long finger;Ring finger;Little finger   L Position Seated  (EOB)   L Weight/Reps/Sets 10 reps x 2   LUE ROM Comment Increased rest breaks due to fatigue Cognition   Arousal/Participation Alert; Cooperative   Attention Within functional limits   Orientation Level Oriented X4   Following Commands Follows all commands and directions without difficulty   Activity Tolerance   Activity Tolerance Patient limited by fatigue  (LImited by SOB)   Assessment   Assessment Patient seen for occupational therapy treatment today  Patient limited by increased WOB with functional activities  SpO2 maintained >95% on 3L O2 via NC throughout session however patient with reports of feeling SOB, visibily increased WOB especially with short distance ambulation  Patient will continue to benefit from skilled OT services in order to maximize functional independence and prepare for safe discharge   Continue OT per POC   Recommendation   OT Discharge Recommendation [address] [facility] License Number  [place], [place] [id number]

## 2020-01-30 ENCOUNTER — ANESTHESIA EVENT (INPATIENT)
Dept: PERIOP | Facility: HOSPITAL | Age: 75
DRG: 191 | End: 2020-01-30
Payer: MEDICARE

## 2020-01-30 ENCOUNTER — APPOINTMENT (INPATIENT)
Dept: PERIOP | Facility: HOSPITAL | Age: 75
DRG: 191 | End: 2020-01-30
Payer: MEDICARE

## 2020-01-30 ENCOUNTER — EPISODE CHANGES (OUTPATIENT)
Dept: CASE MANAGEMENT | Facility: HOSPITAL | Age: 75
End: 2020-01-30

## 2020-01-30 ENCOUNTER — ANESTHESIA (INPATIENT)
Dept: PERIOP | Facility: HOSPITAL | Age: 75
DRG: 191 | End: 2020-01-30
Payer: MEDICARE

## 2020-01-30 ENCOUNTER — APPOINTMENT (INPATIENT)
Dept: RADIOLOGY | Facility: HOSPITAL | Age: 75
DRG: 191 | End: 2020-01-30
Payer: MEDICARE

## 2020-01-30 ENCOUNTER — TRANSITIONAL CARE MANAGEMENT (OUTPATIENT)
Dept: FAMILY MEDICINE CLINIC | Facility: CLINIC | Age: 75
End: 2020-01-30

## 2020-01-30 VITALS
BODY MASS INDEX: 23.69 KG/M2 | SYSTOLIC BLOOD PRESSURE: 101 MMHG | WEIGHT: 200.62 LBS | DIASTOLIC BLOOD PRESSURE: 54 MMHG | HEART RATE: 78 BPM | RESPIRATION RATE: 18 BRPM | TEMPERATURE: 98 F | HEIGHT: 77 IN | OXYGEN SATURATION: 98 %

## 2020-01-30 LAB
ANION GAP SERPL CALCULATED.3IONS-SCNC: 9 MMOL/L (ref 4–13)
BASOPHILS # BLD AUTO: 0.04 THOUSANDS/ΜL (ref 0–0.1)
BASOPHILS NFR BLD AUTO: 1 % (ref 0–1)
BUN SERPL-MCNC: 17 MG/DL (ref 5–25)
CALCIUM SERPL-MCNC: 8.1 MG/DL (ref 8.3–10.1)
CHLORIDE SERPL-SCNC: 103 MMOL/L (ref 100–108)
CO2 SERPL-SCNC: 26 MMOL/L (ref 21–32)
CREAT SERPL-MCNC: 1.25 MG/DL (ref 0.6–1.3)
EOSINOPHIL # BLD AUTO: 0.21 THOUSAND/ΜL (ref 0–0.61)
EOSINOPHIL NFR BLD AUTO: 2 % (ref 0–6)
ERYTHROCYTE [DISTWIDTH] IN BLOOD BY AUTOMATED COUNT: 14.8 % (ref 11.6–15.1)
GFR SERPL CREATININE-BSD FRML MDRD: 56 ML/MIN/1.73SQ M
GLUCOSE SERPL-MCNC: 90 MG/DL (ref 65–140)
HCT VFR BLD AUTO: 38.5 % (ref 36.5–49.3)
HGB BLD-MCNC: 13.3 G/DL (ref 12–17)
IMM GRANULOCYTES # BLD AUTO: 0.09 THOUSAND/UL (ref 0–0.2)
IMM GRANULOCYTES NFR BLD AUTO: 1 % (ref 0–2)
LYMPHOCYTES # BLD AUTO: 2.85 THOUSANDS/ΜL (ref 0.6–4.47)
LYMPHOCYTES NFR BLD AUTO: 33 % (ref 14–44)
MAGNESIUM SERPL-MCNC: 2.1 MG/DL (ref 1.6–2.6)
MCH RBC QN AUTO: 33.2 PG (ref 26.8–34.3)
MCHC RBC AUTO-ENTMCNC: 34.5 G/DL (ref 31.4–37.4)
MCV RBC AUTO: 96 FL (ref 82–98)
MONOCYTES # BLD AUTO: 0.81 THOUSAND/ΜL (ref 0.17–1.22)
MONOCYTES NFR BLD AUTO: 9 % (ref 4–12)
NEUTROPHILS # BLD AUTO: 4.69 THOUSANDS/ΜL (ref 1.85–7.62)
NEUTS SEG NFR BLD AUTO: 54 % (ref 43–75)
NRBC BLD AUTO-RTO: 0 /100 WBCS
PHOSPHATE SERPL-MCNC: 2.7 MG/DL (ref 2.3–4.1)
PLATELET # BLD AUTO: 192 THOUSANDS/UL (ref 149–390)
PMV BLD AUTO: 9.3 FL (ref 8.9–12.7)
POTASSIUM SERPL-SCNC: 4.2 MMOL/L (ref 3.5–5.3)
RBC # BLD AUTO: 4.01 MILLION/UL (ref 3.88–5.62)
SODIUM SERPL-SCNC: 138 MMOL/L (ref 136–145)
WBC # BLD AUTO: 8.69 THOUSAND/UL (ref 4.31–10.16)

## 2020-01-30 PROCEDURE — 45378 DIAGNOSTIC COLONOSCOPY: CPT | Performed by: INTERNAL MEDICINE

## 2020-01-30 PROCEDURE — 99232 SBSQ HOSP IP/OBS MODERATE 35: CPT | Performed by: FAMILY MEDICINE

## 2020-01-30 PROCEDURE — ND001 PR NO DOCUMENTATION: Performed by: FAMILY MEDICINE

## 2020-01-30 PROCEDURE — 94760 N-INVAS EAR/PLS OXIMETRY 1: CPT

## 2020-01-30 PROCEDURE — 94640 AIRWAY INHALATION TREATMENT: CPT

## 2020-01-30 PROCEDURE — 74022 RADEX COMPL AQT ABD SERIES: CPT

## 2020-01-30 PROCEDURE — 84100 ASSAY OF PHOSPHORUS: CPT | Performed by: STUDENT IN AN ORGANIZED HEALTH CARE EDUCATION/TRAINING PROGRAM

## 2020-01-30 PROCEDURE — 0DJD8ZZ INSPECTION OF LOWER INTESTINAL TRACT, VIA NATURAL OR ARTIFICIAL OPENING ENDOSCOPIC: ICD-10-PCS | Performed by: INTERNAL MEDICINE

## 2020-01-30 PROCEDURE — 83735 ASSAY OF MAGNESIUM: CPT | Performed by: STUDENT IN AN ORGANIZED HEALTH CARE EDUCATION/TRAINING PROGRAM

## 2020-01-30 PROCEDURE — 85025 COMPLETE CBC W/AUTO DIFF WBC: CPT | Performed by: STUDENT IN AN ORGANIZED HEALTH CARE EDUCATION/TRAINING PROGRAM

## 2020-01-30 PROCEDURE — 80048 BASIC METABOLIC PNL TOTAL CA: CPT | Performed by: STUDENT IN AN ORGANIZED HEALTH CARE EDUCATION/TRAINING PROGRAM

## 2020-01-30 RX ORDER — SODIUM CHLORIDE, SODIUM LACTATE, POTASSIUM CHLORIDE, CALCIUM CHLORIDE 600; 310; 30; 20 MG/100ML; MG/100ML; MG/100ML; MG/100ML
125 INJECTION, SOLUTION INTRAVENOUS CONTINUOUS
Status: DISCONTINUED | OUTPATIENT
Start: 2020-01-30 | End: 2020-01-30 | Stop reason: HOSPADM

## 2020-01-30 RX ORDER — PROPOFOL 10 MG/ML
INJECTION, EMULSION INTRAVENOUS AS NEEDED
Status: DISCONTINUED | OUTPATIENT
Start: 2020-01-30 | End: 2020-01-30 | Stop reason: SURG

## 2020-01-30 RX ORDER — PROPOFOL 10 MG/ML
INJECTION, EMULSION INTRAVENOUS CONTINUOUS PRN
Status: DISCONTINUED | OUTPATIENT
Start: 2020-01-30 | End: 2020-01-30 | Stop reason: SURG

## 2020-01-30 RX ADMIN — PANTOPRAZOLE SODIUM 40 MG: 40 TABLET, DELAYED RELEASE ORAL at 08:18

## 2020-01-30 RX ADMIN — PROPOFOL 60 MG: 10 INJECTION, EMULSION INTRAVENOUS at 12:09

## 2020-01-30 RX ADMIN — BUDESONIDE 0.5 MG: 0.5 INHALANT RESPIRATORY (INHALATION) at 07:33

## 2020-01-30 RX ADMIN — GABAPENTIN 300 MG: 300 CAPSULE ORAL at 08:18

## 2020-01-30 RX ADMIN — IPRATROPIUM BROMIDE AND ALBUTEROL SULFATE 3 ML: 2.5; .5 SOLUTION RESPIRATORY (INHALATION) at 11:15

## 2020-01-30 RX ADMIN — SODIUM CHLORIDE, SODIUM LACTATE, POTASSIUM CHLORIDE, AND CALCIUM CHLORIDE 125 ML/HR: .6; .31; .03; .02 INJECTION, SOLUTION INTRAVENOUS at 11:52

## 2020-01-30 RX ADMIN — TAMSULOSIN HYDROCHLORIDE 0.4 MG: 0.4 CAPSULE ORAL at 08:18

## 2020-01-30 RX ADMIN — PROPOFOL 130 MCG/KG/MIN: 10 INJECTION, EMULSION INTRAVENOUS at 12:09

## 2020-01-30 RX ADMIN — IPRATROPIUM BROMIDE AND ALBUTEROL SULFATE 3 ML: 2.5; .5 SOLUTION RESPIRATORY (INHALATION) at 15:31

## 2020-01-30 RX ADMIN — IPRATROPIUM BROMIDE AND ALBUTEROL SULFATE 3 ML: 2.5; .5 SOLUTION RESPIRATORY (INHALATION) at 07:33

## 2020-01-30 RX ADMIN — BUSPIRONE HYDROCHLORIDE 10 MG: 10 TABLET ORAL at 08:19

## 2020-01-30 RX ADMIN — FORMOTEROL FUMARATE DIHYDRATE 20 MCG: 20 SOLUTION RESPIRATORY (INHALATION) at 07:49

## 2020-01-30 RX ADMIN — METOCLOPRAMIDE 10 MG: 10 TABLET ORAL at 06:10

## 2020-01-30 RX ADMIN — PHENYLEPHRINE HYDROCHLORIDE 100 MCG: 10 INJECTION INTRAVENOUS at 12:14

## 2020-01-30 RX ADMIN — FINASTERIDE 5 MG: 5 TABLET, FILM COATED ORAL at 08:19

## 2020-01-30 RX ADMIN — AMLODIPINE BESYLATE 10 MG: 10 TABLET ORAL at 08:20

## 2020-01-30 RX ADMIN — FAMOTIDINE 20 MG: 20 TABLET ORAL at 08:19

## 2020-01-30 NOTE — PLAN OF CARE
Problem: Potential for Falls  Goal: Patient will remain free of falls  Description  INTERVENTIONS:  - Assess patient frequently for physical needs  -  Identify cognitive and physical deficits and behaviors that affect risk of falls    -  [place] fall precautions as indicated by assessment   - Educate patient/family on patient safety including physical limitations  - Instruct patient to call for assistance with activity based on assessment  - Modify environment to reduce risk of injury  - Consider OT/PT consult to assist with strengthening/mobility  Outcome: Progressing     Problem: RESPIRATORY - ADULT  Goal: Achieves optimal ventilation and oxygenation  Description  INTERVENTIONS:  - Assess for changes in respiratory status  - Assess for changes in mentation and behavior  - Position to facilitate oxygenation and minimize respiratory effort  - Oxygen administered by appropriate delivery if ordered  - Initiate smoking cessation education as indicated  - Encourage broncho-pulmonary hygiene including cough, deep breathe, Incentive Spirometry  - Assess the need for suctioning and aspirate as needed  - Assess and instruct to report SOB or any respiratory difficulty  - Respiratory Therapy support as indicated  Outcome: Progressing

## 2020-01-30 NOTE — DISCHARGE SUMMARY
[facility] Discharge Summary - Medical Alysia Giordano 75 y o  male MRN: [id number]    [place] [address] [facility] [address] / Bed: 2 Deborah Narayan 323/3 [place] Encounter: [id number]    BRIEF OVERVIEW  Admitting Provider: Mirian Gómez MD  Discharge Provider: Hector Chisholm DO    Discharge To: [facility]  Facility: [facility]      Outpatient Follow-Up: Pulmonology     Things to address at first follow up visit:     1  How is his breathing? 2  How is the new ventilator working for him? 3  Did he make appointment for his Zemaira injection? 4  Repeat chest CT in 3 months to evaluate left lower lobe nodule  5  Did he follow up with GI regarding his dyspepsia? 6  Nevus on left chest wall     Labs and results pending at discharge: none    Admission Date: 1/26/2020     Discharge Date: 1/30/2020    Primary Discharge Diagnosis  Principal Problem:    COPD with acute exacerbation ([facility] [address] )  Active Problems:    AAT (alpha-1-antitrypsin) deficiency (HCC)    Gastroesophageal reflux disease    Essential hypertension    BPH (benign prostatic hyperplasia)    Chronic respiratory failure with hypoxia (HCC)    CLAYTON (acute kidney injury) ([facility] [address] )    Centrilobular emphysema (HCC)    Weakness generalized    Insomnia    Peripheral neuropathy  Resolved Problems:    * No resolved hospital problems  *    Consulting Providers   Dr Rita Ortiz    Procedures Performed/Pertinent Test results  Colonoscopy 1/30- No masses or lesions to correlate with recent abnormal PET scan  Prep was limited  Barium Swallow 1/28: Mild esophageal dysmotility, Moderate gastroesophageal reflux without evidence for mild reflux esophagitis, small hiatal hernia  1/30: WBC 8 69, Hgb 13 3, Hct 38 5  1/29: CTA: Apparent slight enlargement of spiculated left lower lobe 10 mm nodule, suspicious for malignancy  Rpt CT chest in 3 months is advised   No acute pulmonary embolus to the segmental level   No change in chronic left lower lobe pulmonary embolus  COPD   New 6 mm tubular opacity in the anterior left apex, presumably inflammatory pseudonodule     1/29: D-dimer 1 09, Cr 1 13  Barium swallow-mild esophageal dysmotility,moderate GERD without evidence for mild reflux esophagitis, small hiatal hernia  1/28: WBC 9 32 Hg 12 1 Cr 1 34  1/27: WBC 7 54, Hg 13 6 Cr 1 59  1/26:  WNL, troponin neg  Cr 1 87 (BL around 1), BUN 17  CXR NAD  1/28: barium swallow - mild esophageal dysmotility, moderate GERD, small hiatal hernia     HPI from admission H&P   Fareed Tomas is a 75 y o  male with a past medical history significant for COPD, ALT deficiency, centrilobular emphysema, peripheral neuropathy, hypertension, GERD, BPH, and insomnia who presents today with shortness of breath for the past 2-3 days  Patient uses home oxygen 2 L but notes he had to increase it to 5 L the past 2 days  He reports that he has done nothing out of the ordinary  He has been using his medication as directed with no relief  He notes associated lightheadedness but denies syncope  Patient also reports heartburn that seems to be worse than normal today  Patient takes Pepcid and Protonix daily  Patient denies chest pain, diaphoresis, and leg swelling  Patient notes that his stomach has been upset for the past 2 weeks and this has caused a decrease in his appetite as well as generalized weakness  He notes loose stool with that this is normal for him  He denies blood in his stool  He denies vomiting but admits to nausea  ED: methylprednisolone 125mg x1,  Duo nebs x2      Hospital Course    COPD: Patient is a 39-year-old male who was admitted for COPD exacerbation with underlying AAT deficiency with centrilobular emphysema on 3 L home O2  We continued patient's home respiratory regimen which included Pulmicort, Perforomist, and BuSpar  We added Duoneb treatments q 4 hours and IV Solu-Medrol 60 mg q 8 hours    Patient also receives Zemaira injections outpatient which we do not carry at this hospital   IV Solu-Medrol was decreased to 20 mg Q 8 hours on 1/28  Pulmonology was consulted and suggested patient would benefit from non invasive ventilation  They were able to obtain authorization for a Life 2000 to be delivered to patient's home after he is released from short-term rehab  The ventilator company has already been in contact with the patient  Steroids were discontinued on the evening of 1/28  Patient continued to improve and per pulmonology, is at his pulmonary baseline  A home oxygen evaluation was completed; patient is stable at 3 L oxygen at rest and 5 L oxygen with activity  Of note, new CTA was done during admission  It is advised that patient receive another CT in 3 months to evaluate an enlarging nodule in left lower lobe  Patient will need to restart his Zemaira after discharge from short-term rehab  This is done on an outpatient basis  CLAYTON: Patient's creatinine on admission was elevated 1 87 (BL 1 0) and thus diagnosed with acute kidney injury  His Dyazide was held and normal saline was started  Creatinine was 1 25 on discharge  Patient will be able to restart Dyazide after discharge  GERD/ GI upset: It was also brought to light that this patient has had a long history of GI upset and heartburn  Per chart review, he was supposed to get an EGD and colonoscopy in October 2019 but this was canceled by the patient  Also of note, PET scan done on 01/24 (before admission) showed an increase of uptake in distal colon  Barium swallow completed on 01/28 showed mild esophageal dysmotility, moderate gastro esophageal reflux without evidence for mild reflux esophagitis, and a small hiatal hernia  Patient underwent bowel prep on 01/29 and colonoscopy was completed on 01/30  Colonoscopy was normal and could not identify lesion that was seen on PET scan    Patient is a follow-up with GI outpatient to discuss management of his severe dyspepsia  Generalized Weakness: On admission patient noted that he has been feeling weaker in the past couple of weeks  He was evaluated by physical and occupational therapy  Short-term rehab was recommended  Nevus on Left Chest Wall:  Present on admission  Patient notes that it has gotten larger the past few months but has never gotten it checked  This should be removed and biopsied on an outpatient basis  Physical Exam at Discharge    General:  NAD, A&O x3  Heart:  RRR with S1-S2  Lungs:  Decreased air movement in all lung fields, no wheezing or crackles  Abdomen:  Mildly distended from bowel prep, diffusely tender  Extremities:  No edema    Medications   TAKE these medications     TAKE these medications     Morning Afternoon Evening Bedtime As Needed    amLODIPine 10 mg tablet   Commonly known as: NORVASC   TAKE ONE TABLET BY MOUTH ONE TIME DAILY   Refills: 4          budesonide 0 5 mg/2 mL nebulizer solution   Commonly known as: PULMICORT   Take 1 vial (0 5 mg total) by nebulization 2 (two) times a day for 5 days Rinse mouth after use     Refills: 0          busPIRone 10 mg tablet   Commonly known as: BUSPAR   Take 1 tablet (10 mg total) by mouth 3 (three) times a day   Refills: 0          famotidine 20 mg tablet   Commonly known as: PEPCID   Take 1 tablet (20 mg total) by mouth 2 (two) times a day   Refills: 3          finasteride 5 mg tablet   Commonly known as: PROSCAR   Take 5 mg by mouth every morning   Refills: 0          formoterol 20 MCG/2ML nebulizer solution   Commonly known as: PERFOROMIST   Take 20 mcg by nebulization 2 (two) times a day   Refills: 0          gabapentin 300 mg capsule   Commonly known as: NEURONTIN   Take 1 capsule (300 mg total) by mouth 3 (three) times a day   Refills: 3          ipratropium-albuterol 0 5-2 5 mg/3 mL nebulizer solution   Commonly known as: DUO-NEB   Take 1 vial (3 mL total) by nebulization 4 (four) times a day   Refills: 6 metoclopramide 10 mg tablet   Commonly known as: REGLAN   Take 1 tablet (10 mg total) by mouth 2 (two) times a day before meals   Refills: 1          ondansetron 4 mg tablet   Commonly known as: ZOFRAN   Take 1 tablet (4 mg total) by mouth every 8 (eight) hours as needed for nausea or vomiting   Refills: 0          pantoprazole 40 mg tablet   Commonly known as: PROTONIX   Take 1 tablet (40 mg total) by mouth 2 (two) times a day   Refills: 5          tamsulosin 0 4 mg   Commonly known as: FLOMAX   TAKE ONE CAPSULE BY MOUTH ONE TIME DAILY   Refills: 2          triamterene-hydrochlorothiazide 37 5-25 mg per capsule   Commonly known as: DYAZIDE   Take 1 capsule by mouth daily as needed (leg swelling)   Refills: 0          VENTOLIN HFA 90 mcg/act inhaler   Generic drug: albuterol   Refills: 0   Doctor's comments: <!--EPICS-->Substitution to a formulary equivalent within the same pharmaceutical class is authorized  <!--EPICE-->         ZEMAIRA infusion   Generic drug: alpha1-proteinase inhibitor   Refills: 0          zolpidem 10 mg tablet   Commonly known as: AMBIEN   TAKE ONE TABLET BY MOUTH NIGHTLY AT BEDTIME   Refills: 5               Allergies  Allergies   Allergen Reactions    Chantix [Varenicline]      Caused prostate infection       Diet restrictions: none  Activity restrictions: none  Code Status: Level 1 - Full Code  Advance Directive and Living Will: <no information>  Power of :    POLST:      Discharge Condition: good      Discharge  Statement   I spent 25 minutes discharging the patient  This time was spent on the day of discharge  I had direct contact with the patient on the day of discharge  Additional documentation is required if more than 30 minutes were spent on discharge  Some portions of this record may have been generated with voice recognition software  There may be translation, syntax, or grammatical errors   Occasional wrong word or "sound-a-like" substitutions may have occurred due to the inherent limitations of the voice recognition software     [address] [facility]   PGY1

## 2020-01-30 NOTE — SOCIAL WORK
[facility] MD discharged pt and pt will be transferred to [facility]  Spoke with admissions at [facility] and the bed is ready  Pt's dtr will drive pt to [facility] with his portable O2 from home  Spoke with [facility] to update them on pt's Life 2000 portable vent to be delivered to pt's home and that pt will be on 3L nc continuous while at B  Pt stated the Life 2000 co already called him and they will be delivering the port vent to his home once he is finished with rehab at [facility] resident stated that the Alice Mehta will be once/week as ou-pt at Pulmonology Office and that the med resident covering [facility] will order that for out-pt dosing

## 2020-01-30 NOTE — ANESTHESIA PREPROCEDURE EVALUATION
Review of Systems/Medical History  Patient summary reviewed  Chart reviewed  No history of anesthetic complications     Cardiovascular  Hypertension ,   PE, Pulmonary hypertension Pulmonary  COPD severe- O2 dependent , Oxygen dependent nasal cannula,        GI/Hepatic    GERD , Liver disease ,  Hiatal hernia,        Kidney stones,        Endo/Other     GYN       Hematology   Musculoskeletal    Arthritis     Neurology   Psychology                Anesthesia Plan  ASA Score- 4     Anesthesia Type- IV sedation with anesthesia with ASA Monitors  Additional Monitors:   Airway Plan:         Plan Factors-    Induction-     Postoperative Plan-     Informed Consent- Anesthetic plan and risks discussed with patient

## 2020-01-30 NOTE — PLAN OF CARE
Problem: Potential for Falls  Goal: Patient will remain free of falls  Description  INTERVENTIONS:  - Assess patient frequently for physical needs  -  Identify cognitive and physical deficits and behaviors that affect risk of falls    -  [place] fall precautions as indicated by assessment   - Educate patient/family on patient safety including physical limitations  - Instruct patient to call for assistance with activity based on assessment  - Modify environment to reduce risk of injury  - Consider OT/PT consult to assist with strengthening/mobility  1/30/2020 1616 by Shanika Fitch RN  Outcome: Completed  1/30/2020 1240 by Shanika Fitch RN  Outcome: Progressing     Problem: RESPIRATORY - ADULT  Goal: Achieves optimal ventilation and oxygenation  Description  INTERVENTIONS:  - Assess for changes in respiratory status  - Assess for changes in mentation and behavior  - Position to facilitate oxygenation and minimize respiratory effort  - Oxygen administered by appropriate delivery if ordered  - Initiate smoking cessation education as indicated  - Encourage broncho-pulmonary hygiene including cough, deep breathe, Incentive Spirometry  - Assess the need for suctioning and aspirate as needed  - Assess and instruct to report SOB or any respiratory difficulty  - Respiratory Therapy support as indicated  1/30/2020 1616 by Shanika Fitch RN  Outcome: Completed  1/30/2020 1240 by Shanika Fitch RN  Outcome: Progressing

## 2020-01-30 NOTE — [place] UNIVERSAL TRANSFER FORM
[place] UNIVERSAL TRANSFER FORM  (ALL ITEMS MUST BE COMPLETED)    1  TRANSFER FROM: [address]      TRANSFER TO: [facility]    2  DATE OF TRANSFER: 1/30/2020                        TIME OF TRANSFER: 1600    3  PATIENT NAME: GEOVANY Perdomo      YOB: 1945                             GENDER: male    4  LANGUAGE:   English    5  PHYSICIAN NAME:  Taz Pack MD                   PHONE: [phone]    6  CODE STATUS: Level 1 - Full Code        Out of Hospital DNR Attached: No    7  :                                      :  Extended Emergency Contact Information  Primary Emergency Contact: Lucila Ferreira   [place]  Mobile Phone: [phone]  Relation: Daughter           Health Care Representative/Proxy:  No           Legal Guardian:  No             NAME OF:           HEALTH CARE REPRESENTATIVE/PROXY:                                         OR           LEGAL GUARDIAN, IF NOT :                                               PHONE:  (Day)           (Night)                        (Cell)    8  REASON FOR TRANSFER: (Must include brief medical history and recent changes in physical function or cognition ) STR            V/S: /54   Pulse 78   Temp 98 °F (36 7 °C) (Oral)   Resp 18   Ht 6' 5" (1 956 m)   Wt 91 kg (200 lb 9 9 oz)   SpO2 98%   BMI 23 79 kg/m²           PAIN: None    9  PRIMARY DIAGNOSIS: COPD with acute exacerbation ([facility] [address] )      Secondary Diagnosis:         Pacemaker: No      Internal Defib: No          Mental Health Diagnosis (if Applicable):    10  RESTRAINTS: No     11  RESPIRATORY NEEDS: Oxygen Device NC, and Flow rate: 3    12  ISOLATION/PRECAUTION: None    13  ALLERGY: Chantix [varenicline]    14  SENSORY:       Vision Good, Hearing Good  and Speech Clear    15  SKIN CONDITION: No Wounds    16  DIET: Regular    17  IV ACCESS: None    18  PERSONAL ITEMS SENT WITH PATIENT: Cane    23  ATTACHED DOCUMENTS: MUST ATTACH CURRENT MEDICATION INFORMATION Face Sheet, MAR, Medication Reconciliation, TAR, POS, Diagnostic Studies, Labs, Respiratory Care, Code Status, Discharge Summary, PT Note, OT Note, ST Note and HX/PE    20  AT RISK ALERTS:None        HARM TO: N/A    21  WEIGHT BEARING STATUS:         Left Leg: Full        Right Leg: Full    22  MENTAL STATUS:Alert and Oriented    23  FUNCTION:        Walk: Self        Transfer: Self        Toilet: Self        Feed: Self    24  IMMUNIZATIONS/SCREENING:     Immunization History   Administered Date(s) Administered    INFLUENZA 09/27/2016, 09/27/2018, 09/28/2019    Influenza TIV (IM) 09/27/2013, 10/23/2014    Pneumococcal Conjugate 13-Valent 04/26/2016    Pneumococcal Polysaccharide PPV23 04/23/2011    Tdap 04/26/2016    Zoster 10/29/2014, 04/27/2015, 07/24/2018    Zoster Vaccine Recombinant 05/24/2018, 07/24/2018       25  BOWEL: Continent    26  BLADDER: Continent    27   SENDING FACILITY CONTACT: Jennifer Gates                  Title: RN        Unit: [id number]        Phone: [phone]          [address] (if known):        Title:        Unit:         Phone:         FORM PREFILLED BY (if applicable)       Title:       Unit:        Phone:         FORM COMPLETED BY Lalito Silverman RN      Title: RN      Phone: [phone]

## 2020-01-30 NOTE — PROGRESS NOTES
[address] [facility] [address] 81 [facility] Practice Progress Note - Shereen Ruiz 75 y o  male MRN: [id number]    Unit/Bed#: 3 [place] 323-01 Encounter: [id number]      Assessment/Plan:  * COPD with acute exacerbation [facility])  Assessment & Plan  Patient with a history of AAT deficiency and centrolobular emphysema presenting with SOB of 2-3 days  On home O2 2L and reports that he has had to increase O2 to 5L  125mg solumedrol given in ED   Trop neg  · Continue home Pulmicort   · Continue home Performist BID  · Continue home Buspar 10mg TID (respiration stimulator)  · Patient receives weekly Zemaira injections, last injection was Monday  Unable to order this as a hospital medication  · Pulmonology consult - decrease steroids, likely will need bronchoscopy   · Duoneb q4hrs  · IV steroids discontinued on 01/28    Peripheral neuropathy  Assessment & Plan  Continue gabapentin 300 mg TID    Insomnia  Assessment & Plan  Continue zolpidem 10mg     Weakness generalized  Assessment & Plan  Patient has been experiencing generalized weakness for the past 2 weeks  · OT PT evaluation- STR rec  CM aware    Centrilobular emphysema ([address] )  Assessment & Plan  See COPD management    CLAYTON (acute kidney injury) ([facility] [address] )  Assessment & Plan  Cr 1 87 on admission  BL is 1 0  · Hold nephrotoxic agents   · NS 75 ml/hr  · Improved; Cr on 1/27 1 59  · switched SQL to heparin     Chronic respiratory failure with hypoxia (HCC)  Assessment & Plan  Due to COPD requiring continuous home oxygen at 2 L     BPH (benign prostatic hyperplasia)  Assessment & Plan  Last PSA was 0 5 mg in 12/2018  · continue finasteride 5mg and tamsulosin 0 4mg    Essential hypertension  Assessment & Plan  BP on admission 161/71  Home medications include amlodipine 10mg and Dyazide  · Continue amlodipine 10mg  · Hold Dyazide in light of CLAYTON     Gastroesophageal reflux disease  Assessment & Plan  Patient has a long history of stomach upset, heart burn, and diarrhea  He follows with GI outpatient   Per GI notes, he was scheduled to get EGD and colonoscopy in October 2019 but patient canceled the procedures  He complains of heartburn on admission   · Continue home meds: pepcid 20mg BID, protonix 40mg BID, and Reglan BID   · GI consult:  · Patient will need EGD and colonoscopy once he is cleared by pulmonary  · Barium swallow: Mild esophageal dysmotility, Moderate gastroesophageal reflux without evidence for mild reflux esophagitis, Small hiatal hernia  · Colonoscopy 1/29        Subjective:     Patient seen examined at bedside  There were no acute events overnight  Patient notes that he has not had many bowel movements since doing the bowel prep and he feels distended  He denies vomiting  He notes his breathing has been fine and he feels he is at baseline  Objective:     Vitals: Blood pressure 136/64, pulse 76, temperature 98 °F (36 7 °C), temperature source Oral, resp  rate 18, height 6' 5" (1 956 m), weight 91 kg (200 lb 9 9 oz), SpO2 95 %  ,Body mass index is 23 79 kg/m²    Wt Readings from Last 3 Encounters:   01/26/20 91 kg (200 lb 9 9 oz)   01/20/20 88 5 kg (195 lb)   01/13/20 92 5 kg (204 lb)     No intake or output data in the 24 hours ending 01/30/20 0855    Physical Exam:   General:  NAD, a and O x3  Heart:  RRR with S1-S2  Lungs:  Decreased air sounds in all lung fields, no wheezing or crackles  Abdomen:  Distended, tender, normal bowel sounds  Extremities:  No edema      Recent Results (from the past 24 hour(s))   Magnesium    Collection Time: 01/30/20  6:16 AM   Result Value Ref Range    Magnesium 2 1 1 6 - 2 6 mg/dL   Phosphorus    Collection Time: 01/30/20  6:16 AM   Result Value Ref Range    Phosphorus 2 7 2 3 - 4 1 mg/dL   Basic metabolic panel    Collection Time: 01/30/20  6:16 AM   Result Value Ref Range    Sodium 138 136 - 145 mmol/L    Potassium 4 2 3 5 - 5 3 mmol/L    Chloride 103 100 - 108 mmol/L    CO2 26 21 - 32 mmol/L    ANION GAP 9 4 - 13 mmol/L    BUN 17 5 - 25 mg/dL    Creatinine 1  25 0 60 - 1 30 mg/dL    Glucose 90 65 - 140 mg/dL    Calcium 8 1 (L) 8 3 - 10 1 mg/dL    eGFR 56 ml/min/1 73sq m   CBC and differential    Collection Time: 01/30/20  6:16 AM   Result Value Ref Range    WBC 8 69 4 31 - 10 16 Thousand/uL    RBC 4 01 3 88 - 5 62 Million/uL    Hemoglobin 13 3 12 0 - 17 0 g/dL    Hematocrit 38 5 36 5 - 49 3 %    MCV 96 82 - 98 fL    MCH 33 2 26 8 - 34 3 pg    MCHC 34 5 31 4 - 37 4 g/dL    RDW 14 8 11 6 - 15 1 %    MPV 9 3 8 9 - 12 7 fL    Platelets 272 565 - 971 Thousands/uL    nRBC 0 /100 WBCs    Neutrophils Relative 54 43 - 75 %    Immat GRANS % 1 0 - 2 %    Lymphocytes Relative 33 14 - 44 %    Monocytes Relative 9 4 - 12 %    Eosinophils Relative 2 0 - 6 %    Basophils Relative 1 0 - 1 %    Neutrophils Absolute 4 69 1 85 - 7 62 Thousands/µL    Immature Grans Absolute 0 09 0 00 - 0 20 Thousand/uL    Lymphocytes Absolute 2 85 0 60 - 4 47 Thousands/µL    Monocytes Absolute 0 81 0 17 - 1 22 Thousand/µL    Eosinophils Absolute 0 21 0 00 - 0 61 Thousand/µL    Basophils Absolute 0 04 0 00 - 0 10 Thousands/µL       Current Facility-Administered Medications   Medication Dose Route Frequency Provider Last Rate Last Dose    amLODIPine (NORVASC) tablet 10 mg  10 mg Oral Daily Roseann Mabry, DO   10 mg at 01/30/20 0820    budesonide (PULMICORT) inhalation solution 0 5 mg  0 5 mg Nebulization BID Roseann Mabry, DO   0 5 mg at 01/30/20 0733    busPIRone (BUSPAR) tablet 10 mg  10 mg Oral TID Meghan Asters, DO   10 mg at 01/30/20 0819    famotidine (PEPCID) tablet 20 mg  20 mg Oral BID Roseann Mabry, DO   20 mg at 01/30/20 8835    finasteride (PROSCAR) tablet 5 mg  5 mg Oral QAM Roseann Mabry, DO   5 mg at 01/30/20 0819    formoterol (PERFOROMIST) nebulizer solution 20 mcg  20 mcg Nebulization BID Roseann Mabry, DO   20 mcg at 01/30/20 0749    gabapentin (NEURONTIN) capsule 300 mg  300 mg Oral TID Kory Mabry DO   300 mg at 01/30/20 0818    ipratropium-albuterol (DUO-NEB) 0 5-2 5 mg/3 mL inhalation solution 3 mL  3 mL Nebulization Q4H Roseann Aranzaotto, DO   3 mL at 01/30/20 0733    metoclopramide (REGLAN) tablet 10 mg  10 mg Oral BID AC Roseanndionne Mabry, DO   10 mg at 01/30/20 0610    ondansetron (ZOFRAN) injection 4 mg  4 mg Intravenous Q4H PRN Miles Gee MD   4 mg at 01/29/20 2108    pantoprazole (PROTONIX) EC tablet 40 mg  40 mg Oral BID Roseann Mabry, DO   40 mg at 01/30/20 0818    tamsulosin (FLOMAX) capsule 0 4 mg  0 4 mg Oral Daily Roseann Mabry, DO   0 4 mg at 01/30/20 0818    zolpidem (AMBIEN) tablet 10 mg  10 mg Oral HS PRN Will Oglesby,            Invasive Devices     Peripheral Intravenous Line            Peripheral IV 01/27/20 Left Arm 2 days    Peripheral IV 01/29/20 Left;Proximal;Ventral (anterior) Forearm less than 1 day                Lab, Imaging and other studies: I have personally reviewed pertinent reports      VTE Pharmacologic Prophylaxis:  Held today for colonoscopy  VTE Mechanical Prophylaxis: sequential compression device    Stardonte Oglesby, DO

## 2020-02-04 ENCOUNTER — PATIENT OUTREACH (OUTPATIENT)
Dept: CASE MANAGEMENT | Facility: OTHER | Age: 75
End: 2020-02-04

## 2020-02-05 NOTE — PROGRESS NOTES
Inbasket notification received for new bundle  Email with bundle communication tool sent to facility with update on new bundle, DRG, and ELOS  This care manager will continue to monitor via chart and CarePort review throughout bundle episode

## 2020-02-07 ENCOUNTER — TELEPHONE (OUTPATIENT)
Dept: FAMILY MEDICINE CLINIC | Facility: CLINIC | Age: 75
End: 2020-02-07

## 2020-02-07 ENCOUNTER — PATIENT OUTREACH (OUTPATIENT)
Dept: CASE MANAGEMENT | Facility: OTHER | Age: 75
End: 2020-02-07

## 2020-02-07 NOTE — PROGRESS NOTES
Unsuccessful attempt at reaching facility  Left  with name, call back number and detailed message regarding this patient  Requested call back  --------------------------------------------------------------------------------------------    Email reply from facility therapist reports patient expected to discharge to home Monday 2/10/20  This care manager will continue to monitor via chart review throughout bundle episode

## 2020-02-11 ENCOUNTER — PATIENT OUTREACH (OUTPATIENT)
Dept: CASE MANAGEMENT | Facility: OTHER | Age: 75
End: 2020-02-11

## 2020-02-11 NOTE — PROGRESS NOTES
Email received from facility with discharge paperwork  BPCI form and Care Coordination note updated  Removed self from care team and sent Inbasket message to DANIAL Boyd regarding patient handoff  SNF smart form completed  [facility]

## 2020-02-12 ENCOUNTER — OFFICE VISIT (OUTPATIENT)
Dept: FAMILY MEDICINE CLINIC | Facility: CLINIC | Age: 75
End: 2020-02-12
Payer: MEDICARE

## 2020-02-12 VITALS
SYSTOLIC BLOOD PRESSURE: 140 MMHG | RESPIRATION RATE: 16 BRPM | OXYGEN SATURATION: 95 % | BODY MASS INDEX: 22.95 KG/M2 | HEIGHT: 77 IN | HEART RATE: 90 BPM | TEMPERATURE: 97.5 F | WEIGHT: 194.4 LBS | DIASTOLIC BLOOD PRESSURE: 80 MMHG

## 2020-02-12 DIAGNOSIS — J43.2 CENTRILOBULAR EMPHYSEMA (HCC): ICD-10-CM

## 2020-02-12 DIAGNOSIS — E88.01 AAT (ALPHA-1-ANTITRYPSIN) DEFICIENCY (HCC): ICD-10-CM

## 2020-02-12 DIAGNOSIS — K21.9 GASTROESOPHAGEAL REFLUX DISEASE WITHOUT ESOPHAGITIS: ICD-10-CM

## 2020-02-12 DIAGNOSIS — Z09 HOSPITAL DISCHARGE FOLLOW-UP: Primary | ICD-10-CM

## 2020-02-12 DIAGNOSIS — R91.8 LUNG NODULES: ICD-10-CM

## 2020-02-12 DIAGNOSIS — R94.8 ABNORMAL PET SCAN OF COLON: ICD-10-CM

## 2020-02-12 DIAGNOSIS — M62.838 CERVICAL PARASPINAL MUSCLE SPASM: ICD-10-CM

## 2020-02-12 DIAGNOSIS — J96.21 ACUTE ON CHRONIC RESPIRATORY FAILURE WITH HYPOXIA (HCC): ICD-10-CM

## 2020-02-12 PROCEDURE — 99214 OFFICE O/P EST MOD 30 MIN: CPT | Performed by: FAMILY MEDICINE

## 2020-02-12 PROCEDURE — 1111F DSCHRG MED/CURRENT MED MERGE: CPT | Performed by: FAMILY MEDICINE

## 2020-02-12 PROCEDURE — 1036F TOBACCO NON-USER: CPT | Performed by: FAMILY MEDICINE

## 2020-02-12 PROCEDURE — 3077F SYST BP >= 140 MM HG: CPT | Performed by: FAMILY MEDICINE

## 2020-02-12 PROCEDURE — 3079F DIAST BP 80-89 MM HG: CPT | Performed by: FAMILY MEDICINE

## 2020-02-12 PROCEDURE — 4040F PNEUMOC VAC/ADMIN/RCVD: CPT | Performed by: FAMILY MEDICINE

## 2020-02-12 PROCEDURE — 3008F BODY MASS INDEX DOCD: CPT | Performed by: FAMILY MEDICINE

## 2020-02-12 PROCEDURE — 1160F RVW MEDS BY RX/DR IN RCRD: CPT | Performed by: FAMILY MEDICINE

## 2020-02-12 RX ORDER — METHOCARBAMOL 750 MG/1
750 TABLET, FILM COATED ORAL 3 TIMES DAILY PRN
Qty: 30 TABLET | Refills: 1 | Status: SHIPPED | OUTPATIENT
Start: 2020-02-12 | End: 2020-05-26

## 2020-02-12 RX ORDER — PANTOPRAZOLE SODIUM 40 MG/1
40 TABLET, DELAYED RELEASE ORAL 2 TIMES DAILY
Qty: 180 TABLET | Refills: 1 | Status: SHIPPED | OUTPATIENT
Start: 2020-02-12 | End: 2020-11-04 | Stop reason: SDUPTHER

## 2020-02-12 NOTE — PROGRESS NOTES
Assessment/Plan:      Diagnoses and all orders for this visit:    Hospital discharge follow-up    Acute on chronic respiratory failure with hypoxia (HCC)    AAT (alpha-1-antitrypsin) deficiency (HCC)    Cervical paraspinal muscle spasm  -     methocarbamol (ROBAXIN) 750 mg tablet; Take 1 tablet (750 mg total) by mouth 3 (three) times a day as needed for muscle spasms    Lung nodules    Centrilobular emphysema (HCC)    Abnormal PET scan of colon    Gastroesophageal reflux disease without esophagitis  -     pantoprazole (PROTONIX) 40 mg tablet; Take 1 tablet (40 mg total) by mouth 2 (two) times a day          Patient is due to follow up with pulmonology tomorrow 2/13  He will do so for continued management of his chronic respiratory failure, lung nodules, and COPD  Discussed the patient's medications  Based on GI recommendations during hosiptalization, the patient should be on protonix 40 mg po bid  He will discontinue the pepcid  Medication sent to the patient's pharmacy  The patient is cleared to restart his [place]  Discussed hospital delirium with the patient as the likely cause of his symptoms during his admission  Symptoms were likely due to several factors such as unfamiliar environment and acute illness, in combination with his [place]  As he is now at home, no longer acutely ill, and having trouble sleeping he may resume the [place]  For the patient's neck pain, prescribed robaxin as this muscle relaxer is less sedating with fewer side effects than other options  Advised the patient that he should still be careful with regard to this and his [place] and try to avoid taking them at the same time  Patient declined PT  Follow up as needed for symptoms  Encouraged patient to make an annual well visit  Subjective:     Patient ID: Burton [place] is a 67 y o  male      HPI     67-year-old male with multiple medical problems including alpha-1 antitrypsin, COPD, chronic respiratory failure, liver disease, lung nodule with possible neoplasm presents for follow-up after hospitalization  Patient was admitted to 1 hospital approximately 2 and half weeks ago after progressively worsening shortness of breath  He was diagnosed with COPD exacerbation complicated by his underlying AAT deficiency  He was treated with IV steroids, nebulizer treatments, as well as continued on his home Pulmicort  He receives weekly Zemaira injections  While he was hospitalized he received a colonoscopy due to focal FDG uptake noted incidentally on the descending colon on an outpatient PET scan ordered by pulmonology  The patient's pulmonary nodules did NOT show increased uptake on the scan  Colonoscopy was normal  Patient inquires about the results of the colonoscopy ;     Since the patient's discharge he has been feeling relatively well  He does complain of persistent musculoskeletal neck pain which is chronic for him, but worsened since being confined to a hospital bed  He is not interested in physical therapy  He is somewhat confused about his medication regimen and asks what medications he should be taking  He has been taking famotidine, but not protonix  It appears that in the hospital when he was seen by GI, he was on protonix only  He also inquires about his [place]  He has been on this medication for years and he states that sometimes he cannot sleep without it  He does not need refills, but states he was taken off of the medication in the hospital after an episode of delirium  He does not know if he is cleared to restart the medication, however states that he has been sleeping very poorly since discharge due to not taking the medication  He otherwise feels well despite slightly increased fatigue due to poor sleeping  He states that his respiratory status is at baseline, with no increase in SOB or WALKER  He denies fevers, chills, headaches, dizziness, malaise, or other symptoms      Review of Systems    Pertinent items are noted in HPI  Objective:     Physical Exam   Constitutional: He is oriented to person, place, and time  He appears well-developed and well-nourished  No distress  HENT:   Head: Normocephalic and atraumatic  Neck: Muscular tenderness present  No neck rigidity  Decreased range of motion present  Cardiovascular: Normal rate, regular rhythm, S1 normal and S2 normal  Exam reveals distant heart sounds  Pulmonary/Chest: No accessory muscle usage  No respiratory distress  He has decreased breath sounds in the right upper field, the right middle field, the right lower field, the left upper field, the left middle field and the left lower field  He has wheezes (scattered)  He has no rhonchi  He has no rales  Comfortable appearing on NC O2   Musculoskeletal: He exhibits no edema  Neurological: He is alert and oriented to person, place, and time  No cranial nerve deficit or sensory deficit  He exhibits normal muscle tone  Coordination normal    Skin: Skin is warm and dry  Capillary refill takes less than 2 seconds  He is not diaphoretic  No pallor  Charliet DO Fernando  02/13/20  9:17 AM    Some portions of this record may have been generated with voice recognition software  There may be translation, syntax, or grammatical errors  Occasional wrong word or "sound-a-like" substitutions may have occurred due to the inherent limitations of the voice recognition software  Read the chart carefully and recognize, using context, where substations may have occurred   If you have any questions, please contact the dictating provider for clarification or correction, as needed

## 2020-02-13 ENCOUNTER — TELEPHONE (OUTPATIENT)
Dept: FAMILY MEDICINE CLINIC | Facility: CLINIC | Age: 75
End: 2020-02-13

## 2020-02-13 ENCOUNTER — CLINICAL SUPPORT (OUTPATIENT)
Dept: PULMONOLOGY | Facility: CLINIC | Age: 75
End: 2020-02-13
Payer: MEDICARE

## 2020-02-13 ENCOUNTER — OFFICE VISIT (OUTPATIENT)
Dept: PULMONOLOGY | Facility: MEDICAL CENTER | Age: 75
End: 2020-02-13
Payer: MEDICARE

## 2020-02-13 VITALS
HEIGHT: 76 IN | WEIGHT: 192 LBS | HEART RATE: 98 BPM | RESPIRATION RATE: 20 BRPM | DIASTOLIC BLOOD PRESSURE: 86 MMHG | SYSTOLIC BLOOD PRESSURE: 140 MMHG | TEMPERATURE: 98.4 F | BODY MASS INDEX: 23.38 KG/M2 | OXYGEN SATURATION: 94 %

## 2020-02-13 VITALS — WEIGHT: 192.5 LBS | HEIGHT: 77 IN | BODY MASS INDEX: 22.73 KG/M2

## 2020-02-13 DIAGNOSIS — E88.01 AAT (ALPHA-1-ANTITRYPSIN) DEFICIENCY (HCC): Primary | ICD-10-CM

## 2020-02-13 DIAGNOSIS — R91.8 LUNG MASS: ICD-10-CM

## 2020-02-13 DIAGNOSIS — J96.11 CHRONIC RESPIRATORY FAILURE WITH HYPOXIA (HCC): ICD-10-CM

## 2020-02-13 DIAGNOSIS — Z87.891 FORMER SMOKER: ICD-10-CM

## 2020-02-13 DIAGNOSIS — J44.1 COPD WITH ACUTE EXACERBATION (HCC): ICD-10-CM

## 2020-02-13 DIAGNOSIS — J43.2 CENTRILOBULAR EMPHYSEMA (HCC): ICD-10-CM

## 2020-02-13 PROCEDURE — 3079F DIAST BP 80-89 MM HG: CPT | Performed by: PHYSICIAN ASSISTANT

## 2020-02-13 PROCEDURE — 1160F RVW MEDS BY RX/DR IN RCRD: CPT | Performed by: PHYSICIAN ASSISTANT

## 2020-02-13 PROCEDURE — 1036F TOBACCO NON-USER: CPT | Performed by: PHYSICIAN ASSISTANT

## 2020-02-13 PROCEDURE — 1111F DSCHRG MED/CURRENT MED MERGE: CPT | Performed by: PHYSICIAN ASSISTANT

## 2020-02-13 PROCEDURE — 99215 OFFICE O/P EST HI 40 MIN: CPT | Performed by: PHYSICIAN ASSISTANT

## 2020-02-13 PROCEDURE — 3008F BODY MASS INDEX DOCD: CPT | Performed by: PHYSICIAN ASSISTANT

## 2020-02-13 PROCEDURE — 3077F SYST BP >= 140 MM HG: CPT | Performed by: PHYSICIAN ASSISTANT

## 2020-02-13 PROCEDURE — 4040F PNEUMOC VAC/ADMIN/RCVD: CPT | Performed by: PHYSICIAN ASSISTANT

## 2020-02-13 NOTE — PATIENT INSTRUCTIONS
Chronic dyspnea and hypoxemic respiratory failure:  -continue 3 L cannula ambulation  -resume pulmonary rehab  -contact [facility] today for delivery of life 2000 ventilator    Left upper lobe lung nodule:  -repeat CT scan, call to schedule, ordered for April 30, 2020  -call [phone] to schedule  -previous PET-CT negative  -if enlarging need to consider biopsy options    Follow-up in 3 months for lung nodule follow-up    Follow-up in 1 month for follow-up post ventilator initiation

## 2020-02-13 NOTE — PROGRESS NOTES
Miryam Sapp   1945     Risk: high     Pre Post % Change Goal   Date: 2/13/2020      Physical       CAT 27   10% increase   6MWT (feet) 680   10% increase   UCSD Dyspnea Score 82   5 pt decrease   Arm Curl 15      Chair to Stand 8      Peak exercise CR/VT (mets) 2 0   40% increase   Emotional       PHQ9 (> 5   refer to MD) 10   4 pt decrease   GAD7 4      [place] (lower score = improvement)        Total 32   < 27   Feelings 2   < 3   Physical Fitness 4   < 3   Social Support 3   < 3   Daily Activities 4   < 3   Social Activities 5   < 3   Pain 4   < 3   Overall Health 5   < 3   Quality of Life 3   < 3   Change in Health 2   < 3   Dietary       Rate your plate 41   > 58   Measurements       Weight 192 5   2 5 - 5%   BMI 22 8   19 - 25   Waist Circ  42 5   < 40 M / < 35 F   % Body fat 34 9   < 25 M / < 33 F   BP left arm               (systolic) 532   < 439   (diastolic) 78   < 90   Smoking #/day  (if applicable) 0   0   Lipids/Glucose (Date) 11/11/19      Total cholesterol 168   50 - 200   Triglycerides 66   < 150   HDL 40   40 - 60      < 100   A1C    4 0 - 5 6%   Fasting BG

## 2020-02-13 NOTE — PROGRESS NOTES
PULMONARY REHAB ASSESSMENT    Today's date: 2020  Patient name: Iris Jones     : 1945       MRN: [id number]  PCP: Good Gatica MD  Referring Physician: Monet Guillory PA-C  Pulmonologist: Tamia Xavier  Surgeon: N/A  Dx:   Encounter Diagnosis   Name Primary?  COPD with acute exacerbation ([facility] )        Date of onset: 2019  Cultural needs: none    Height:    Wt Readings from Last 1 Encounters:   20 87 3 kg (192 lb 8 oz)      Weight:   Ht Readings from Last 1 Encounters:   20 6' 5" (1 956 m)     Medical History:   Past Medical History:   Diagnosis Date    Anesthesia complication     Difficult to wake up    Arthritis     BPH (benign prostatic hyperplasia)     urinary frequency    Cancer (HCC)     basal cell neck, face    COPD (chronic obstructive pulmonary disease) (HCC)     COPD exacerbation ([facility] ) 2019    Full dentures     Hiatal hernia     History of methicillin resistant staphylococcus aureus (MRSA)     10/11/2018 MRSA (nares) positive    Hypertension     controlled    Irritable bowel syndrome     Kidney stone     at least 7 episodes    Liver disease     Alpha 1- enzyme deficiency - diagnosed    has been on weekly replacement therapy since then    Pulmonary emphysema ([facility] )     1/25/15  FEV1 - 2 45 liters or 59% of predicted    RSV infection 2017    Wears glasses     for driving only         Physical Limitations: shortness of breath and fatigue with minimal exertion    Risk Factors   Cholesterol: Yes  Smoking: Former user  HTN: Yes  DM: No  Obesity: No   Inactivity: Yes  Stress:  perceived  stress:    Stressors:adult son asking for money, and patients health, stress in regards to people encouraging him to move to a long term facility    Goals for Stress Management:Increase endurance to avoid long term care, continue to follow up with MD in regards to depression, take medication as prescribed    Family History:  Family History Problem Relation Age of Onset    Emphysema Mother         never smoked    Emphysema Father     Cancer Brother         colon    Colon cancer Brother     Ulcerative colitis Family     Liver disease Family        Allergies: Chantix [varenicline]  ETOH:   Social History     Substance and Sexual Activity   Alcohol Use Not Currently    Frequency: Never    Comment: stopped in 2009         Current Medications:   Current Outpatient Medications   Medication Sig Dispense Refill    amLODIPine (NORVASC) 10 mg tablet TAKE ONE TABLET BY MOUTH ONE TIME DAILY  30 tablet 4    budesonide (PULMICORT) 0 5 mg/2 mL nebulizer solution Take 1 vial (0 5 mg total) by nebulization 2 (two) times a day for 5 days Rinse mouth after use   10 vial 0    busPIRone (BUSPAR) 10 mg tablet Take 1 tablet (10 mg total) by mouth 3 (three) times a day 90 tablet 0    finasteride (PROSCAR) 5 mg tablet Take 5 mg by mouth every morning        formoterol (PERFOROMIST) 20 MCG/2ML nebulizer solution Take 20 mcg by nebulization 2 (two) times a day      gabapentin (NEURONTIN) 300 mg capsule Take 1 capsule (300 mg total) by mouth 3 (three) times a day 90 capsule 3    ipratropium-albuterol (DUO-NEB) 0 5-2 5 mg/3 mL nebulizer solution Take 1 vial (3 mL total) by nebulization 4 (four) times a day 120 vial 6    methocarbamol (ROBAXIN) 750 mg tablet Take 1 tablet (750 mg total) by mouth 3 (three) times a day as needed for muscle spasms 30 tablet 1    metoclopramide (REGLAN) 10 mg tablet Take 1 tablet (10 mg total) by mouth 2 (two) times a day before meals 60 tablet 1    ondansetron (ZOFRAN) 4 mg tablet Take 1 tablet (4 mg total) by mouth every 8 (eight) hours as needed for nausea or vomiting 20 tablet 0    OXYGEN-HELIUM IN Inhale 2L at rest- 3L with activity      pantoprazole (PROTONIX) 40 mg tablet Take 1 tablet (40 mg total) by mouth 2 (two) times a day 180 tablet 1    tamsulosin (FLOMAX) 0 4 mg TAKE ONE CAPSULE BY MOUTH ONE TIME DAILY  30 capsule 2  triamterene-hydrochlorothiazide (DYAZIDE) 37 5-25 mg per capsule Take 1 capsule by mouth daily as needed (leg swelling) 20 capsule 0    VENTOLIN  (90 Base) MCG/ACT inhaler       ZEMAIRA infusion       zolpidem (AMBIEN) 10 mg tablet TAKE ONE TABLET BY MOUTH NIGHTLY AT BEDTIME  30 tablet 5     No current facility-administered medications for this visit  Functional Status Prior to Diagnosis for Treatment   Occupation: retired  Recreation: playing pool, Trendrating ball, and [email or web address] board, watching Tv  ADLs: able to perform self-care  Otero: able to perform self-care  Exercise: physical therapy  Other: none    Current Functional Status  Occupation: retired  Recreation: watches TV  ADLs:able to perform self-care  Otero: able to perform self-care  Exercise: none  Other: none    Patient Specific Goals: Increase endurance and strength to go to store to buy clothes    Short Term Program Goals: increased strength and endurance to return to walking into clubhouse and participating with friends playing [employer] and shuffleboard    Long Term Goals: increase strength and endurance to reside at home as long as possible  Ability to reach goals/rehabilitation potential:  Good    Projected return to function: 8-12 weeks  Objective tests: 6 MWT  sit to stand  arm curl      Nutritional   Reviewed details of Rate your Plate  Discussed key elements of heart healthy eating  Reviewed patient goals for dietary modifications and their clinical implications  Reviewed most recent lipid profile       Goals for dietary modification: pt stated he decreased snacks but unwilling to make dietary changes at this time      Emotional/Social  Patient reports feeling some depression since d/c  Reports sufficient emotional support  has resumed medical therapy for depression/anxiety    SOCIAL SUPPORT NETWORK    Marital status:     Rate 1-5:    Marriage: N/A   Family: 5   Financial: 0   Relationships: 0   Spirituality: 0   Intellectual: 0      Domestic Violence Screening: feels safe    Comments: initial evaluation completed

## 2020-02-13 NOTE — PROGRESS NOTES
Assessment/Plan:    Problem List Items Addressed This Visit        Digestive    AAT (alpha-1-antitrypsin) deficiency ([facility] ) - Primary       Respiratory    Chronic respiratory failure with hypoxia ([facility] )    Centrilobular emphysema ([facility] )       Other    Former smoker    Relevant Orders    CT chest without contrast    Lung mass    Relevant Orders    CT chest without contrast            Plan for today/next follow-up:    Return in about 4 weeks (around 3/12/2020)  All questions are answered to the patient's satisfaction and understanding  He verbalizes understanding  He is encouraged to call with any further questions or concerns  ______________________________________________________________________    Chief Complaint:   Chief Complaint   Patient presents with   [facility]       Patient ID: Emma Lindquist is a 67 y o  y o  male has a past medical history of Anesthesia complication, Arthritis, BPH (benign prostatic hyperplasia), Cancer ([facility] ), COPD (chronic obstructive pulmonary disease) ([facility] ), COPD exacerbation ([facility] ) (12/29/2019), Full dentures, Hiatal hernia, History of methicillin resistant staphylococcus aureus (MRSA), Hypertension, Irritable bowel syndrome, Kidney stone, Liver disease, Pulmonary emphysema ([facility] ), RSV infection (12/2017), and Wears glasses  2/13/2020  Patient presents today for follow-up visit for: post hospitalization follow-up:    Hospital notes as listed below  He is status post short-term rehab at [facility]  He is otherwise doing well  He is going today for his repeat evaluation at pulmonary rehabilitation  He still has 12 remaining lifetime  Visits with pulmonary rehab  He is attending [place] this afternoon  We discussed continuing oxygen with portability until his life 2000 ventilators delivered  Our office will contact the company today for delivery  He has been out of rehab since Tuesday        He is to continue his maintenance medications of perforomist / pulmicort and gelacio and remains on Zemaira  He had a treatment performed at home yesterday  We discussed LLL lung nodule w/ irregularity that is concerning despite negative PET CT  We also discussed risks v  Benefits of advanced procedures such as CT guided Bx v  Naviagational bronch  He is a high risk for pneumothorax w/ CT guided Bx  He would like to wait for further diagnostics with next follow up CT to decide whether or not to pursue navigational bronchoscopy       _____________________________________________________________________________________________________________________________________  Most recent Hospital Note : Assessment / Plan Follow up:     Has known severe emphysematous COPD October 2019 FEV1 1 78 L or 44% chronically managed with Perforomist, Pulmicort DuoNeb,  chronic oxygen dependence of 2 L, 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 diagnosed in 2002, HTN, hyperlipidemia, GERD on both Pepcid and Protonix, BPH   History of PE February 2018 post 6 months of Eliquis treatment, off anticoagulation   Small chronic thrombus   Right upper lobe lung nodule, left lower lobe nodule      Additionally, he has followed up for PET CT scanning of his pulmonary nodules recently w/ negative SUV uptake, however, in the face of nodular growth of right sided nodules   He did have positive uptake in the colon which is currently under investigation by GI with consideration for colonoscopy once stable from respiratory standpoint      Admitted for acute on chronic dyspnea  Assessment/Plan:     Altered Mental Status:  -resolved delusions and hallucinations  -ongoing monitoring  -remains off steroids     Chronic hypoxemic respiratory failure with Dyspnea:  -patient is typically on 3 L NC resting and up to 5 L w/ activity as per outpatient notation   -patient is at his baseline  -always dyspneic with minimal exertion  -recent NL nuc med stress  -Camilo Ferreira has chronic respiratory failure due to COPD and will need mechanical ventilator   ABG results as below   We have considered BiPAP and he is not a candidate for this  NIV is needed to decrease the work of breathing and improve pulmonary status   Without this respiratory support, serious harm or death could occur   An NIV would decrease in hospital readmission rate and improve overall quality of life   Specifically requesting life 2000 noninvasive ventilator  -authorization has been obtained from [facility] and Life 2000 to be delivered to patient's home s/p STR  -they have already been in contact with the patient     Blood gas, arterial   Order: [id number]   Status:  Final result   Visible to patient:  No (Not Released)   Next appt:  02/04/2020 at 09:45 AM in Family Medicine Juan Lujan MD)     Ref Range & Units 1/28/20 1107  pH, Arterial 7 350 - 7 450 7 418   PH ART TC 7 350 - 7 450 7 424   pCO2, Arterial 36 0 - 44 0 mm Hg 37 7   PCO2 (TC) Arterial 36 0 - 44 0 mm Hg 37 0   pO2, Arterial 75 0 - 129 0 mm Hg 110 2   PO2 (TC) Arterial 75 0 - 129 0 mm Hg 107 8   HCO3, Arterial 22 0 - 28 0 mmol/L 23 8   Base Excess, Arterial mmol/L -0 4   O2 Content, Arterial 16 0 - 23 0 mL/dL 18 8   O2 HGB,Arterial  94 0 - 97 0 % 97 1High    SOURCE   Radial, Right   FLORENCE TEST   Yes   Temperature Degrees Fehrenheit 97 8   Nasal Cannula   3          Severe emphysematous COPD:  -FEV 1 1 78L or 44% October 2019 Spirometry  -I believe this is simply a worsening of his baseline and deconditioning  -there is no evidence of acute COPD exacerbation as this patient was admitted on his baseline oxygen therapy with minimal to   -his clinical course remains at his baseline off of steroids which were rapidly weaned to off yesterday  -at this time the patient is clinically stable   -currently no wheezing     Alpha-1 antitrypsin deficiency:  -is on weekly Zemaira injections  -held while hospitalized     Lung nodules:  Historically stable  Will follow up in the outpatient setting  Likely to need navigational bronchoscopy  CT guided biopsy likely too risky      Positive abnormal PET scan:  -with positive SUV uptake in the colon  -gastroenterology following  -plan is for colonoscopy 1/30  -his clinical risk is graded as minimal for postoperative complications > low risk , conservatively, however, I would treat him as a moderate risk  -okay to proceed with procedure with standard precautions for COPD and chronically hypoxemic patient     D/C and Follow up Needs:  Adjustment of Oxygen if necessary  Approval for NIV > via Cherry Boyle > CM   Possible Adjunct therapy I e  Brad Rust  _____________________________________________________________________________________________________________________________________    Smoking history: 60 pack year smoking history   Occupational history:  deferred  Environmental History:no environmental factors or PETs  Travel history:No recent travel > does not travel from home routinely  Respiratory History:Severe Emphysematous COPD / Alpha 1 Antitrypsin Deficiency > perforomist / pulmicort / duonebs  Oxygen Therapy:3 L rest / 5 L amb w/ POC  PAP Therapy:No CPAP or BIPAP  DME Company:[email or web address]  Rx Insurance:Medicare / [place] [employer]  Has attended Pulmonary Rehab  Review of Systems   Constitutional: Negative for activity change, chills, fatigue, fever and unexpected weight change  HENT: Negative for congestion, postnasal drip and rhinorrhea  Eyes: Negative for visual disturbance  Respiratory: Positive for shortness of breath  Negative for cough, chest tightness, wheezing and stridor  Cardiovascular: Negative for chest pain and leg swelling  Gastrointestinal: Negative for abdominal pain, diarrhea, nausea and vomiting  Endocrine: Negative for cold intolerance and heat intolerance  Genitourinary: Negative for flank pain  Musculoskeletal: Negative for arthralgias, joint swelling and myalgias  Skin: Negative for color change  Allergic/Immunologic: Negative for environmental allergies  Neurological: Negative for syncope and light-headedness  Hematological: Negative for adenopathy  Psychiatric/Behavioral: Negative for confusion  The following portions of the patient's history were reviewed and updated as appropriate: allergies, current medications, past family history, past medical history, past social history, past surgical history and problem list     Smoking history: He reports that he quit smoking about 2 years ago  He has a 60 00 pack-year smoking history  He has never used smokeless tobacco   Social history: He reports that he quit smoking about 2 years ago  He has a 60 00 pack-year smoking history  He has never used smokeless tobacco  He reports that he drank alcohol  He reports that he does not use drugs  Past Medical History:   Diagnosis Date    Anesthesia complication     Difficult to wake up    Arthritis     BPH (benign prostatic hyperplasia)     urinary frequency    Cancer (HCC)     basal cell neck, face    COPD (chronic obstructive pulmonary disease) (HCC)     COPD exacerbation (HCC) 12/29/2019    Full dentures     Hiatal hernia     History of methicillin resistant staphylococcus aureus (MRSA)     10/11/2018 MRSA (nares) positive    Hypertension     controlled    Irritable bowel syndrome     Kidney stone     at least 7 episodes    Liver disease     Alpha 1- enzyme deficiency - diagnosed 2002  has been on weekly replacement therapy since then    Pulmonary emphysema ([facility] )     1/25/15  FEV1 - 2 45 liters or 59% of predicted    RSV infection 12/2017    Wears glasses     for driving only     Past Surgical History:   Procedure Laterality Date    BACK SURGERY  2008    discectomy    COLONOSCOPY      COLONOSCOPY N/A 3/10/2017    Procedure: Kewrin Mcdermott;  Surgeon: Ross Carr MD;  Location: [place] [address] GI LAB;   Service:     CYSTOSCOPY      removal of kidney stones    ESOPHAGOGASTRODUODENOSCOPY N/A 3/10/2017    Procedure: ESOPHAGOGASTRODUODENOSCOPY (EGD); Surgeon: Ernestina Kamara MD;  Location: [facility] GI LAB; Service:     LITHOTRIPSY      MI ESOPHAGOGASTRODUODENOSCOPY TRANSORAL DIAGNOSTIC N/A 11/20/2018    Procedure: ESOPHAGOGASTRODUODENOSCOPY (EGD); Surgeon: Ernestina Kamara MD;  Location: [facility] GI LAB; Service: Gastroenterology    TONSILLECTOMY      VEIN LIGATION AND STRIPPING Bilateral     17's     Family History   Problem Relation Age of Onset    Emphysema Mother         never smoked    Emphysema Father     Cancer Brother         colon    Colon cancer Brother     Ulcerative colitis Family     Liver disease Family      Immunization History   Administered Date(s) Administered    INFLUENZA 09/27/2016, 09/27/2018, 09/28/2019    Influenza TIV (IM) 09/27/2013, 10/23/2014    Pneumococcal Conjugate 13-Valent 04/26/2016    Pneumococcal Polysaccharide PPV23 04/23/2011    Tdap 04/26/2016    Zoster 10/29/2014, 04/27/2015, 07/24/2018    Zoster Vaccine Recombinant 05/24/2018, 07/24/2018     Current Outpatient Medications   Medication Sig Dispense Refill    amLODIPine (NORVASC) 10 mg tablet TAKE ONE TABLET BY MOUTH ONE TIME DAILY  30 tablet 4    budesonide (PULMICORT) 0 5 mg/2 mL nebulizer solution Take 1 vial (0 5 mg total) by nebulization 2 (two) times a day for 5 days Rinse mouth after use   10 vial 0    busPIRone (BUSPAR) 10 mg tablet Take 1 tablet (10 mg total) by mouth 3 (three) times a day 90 tablet 0    famotidine (PEPCID) 20 mg tablet Take 1 tablet (20 mg total) by mouth 2 (two) times a day 60 tablet 3    finasteride (PROSCAR) 5 mg tablet Take 5 mg by mouth every morning        formoterol (PERFOROMIST) 20 MCG/2ML nebulizer solution Take 20 mcg by nebulization 2 (two) times a day      gabapentin (NEURONTIN) 300 mg capsule Take 1 capsule (300 mg total) by mouth 3 (three) times a day 90 capsule 3    ipratropium-albuterol (DUO-NEB) 0 5-2 5 mg/3 mL nebulizer solution Take 1 vial (3 mL total) by nebulization 4 (four) times a day 120 vial 6    methocarbamol (ROBAXIN) 750 mg tablet Take 1 tablet (750 mg total) by mouth 3 (three) times a day as needed for muscle spasms 30 tablet 1    metoclopramide (REGLAN) 10 mg tablet Take 1 tablet (10 mg total) by mouth 2 (two) times a day before meals 60 tablet 1    ondansetron (ZOFRAN) 4 mg tablet Take 1 tablet (4 mg total) by mouth every 8 (eight) hours as needed for nausea or vomiting 20 tablet 0    OXYGEN-HELIUM IN Inhale 2L at rest- 3L with activity      pantoprazole (PROTONIX) 40 mg tablet Take 1 tablet (40 mg total) by mouth 2 (two) times a day 180 tablet 1    tamsulosin (FLOMAX) 0 4 mg TAKE ONE CAPSULE BY MOUTH ONE TIME DAILY  30 capsule 2    triamterene-hydrochlorothiazide (DYAZIDE) 37 5-25 mg per capsule Take 1 capsule by mouth daily as needed (leg swelling) 20 capsule 0    VENTOLIN  (90 Base) MCG/ACT inhaler       ZEMAIRA infusion       zolpidem (AMBIEN) 10 mg tablet TAKE ONE TABLET BY MOUTH NIGHTLY AT BEDTIME  30 tablet 5     No current facility-administered medications for this visit  Allergies: Chantix [varenicline]    Objective:  Vitals:    02/13/20 0817   BP: 140/86   BP Location: Right arm   Patient Position: Sitting   Cuff Size: Standard   Pulse: 98   Resp: 20   Temp: 98 4 °F (36 9 °C)   TempSrc: Tympanic   SpO2: 94%   Weight: 87 1 kg (192 lb)   Height: 6' 4" (1 93 m)   Oxygen Therapy  SpO2: 94 %(3L poc ambulating)    Wt Readings from Last 3 Encounters:   02/13/20 87 1 kg (192 lb)   02/12/20 88 2 kg (194 lb 6 4 oz)   01/26/20 91 kg (200 lb 9 9 oz)     Body mass index is 23 37 kg/m²  Physical Exam   Constitutional: He is oriented to person, place, and time  He appears well-developed  HENT:   Head: Normocephalic and atraumatic  Eyes: Pupils are equal, round, and reactive to light  Conjunctivae are normal    Neck: Normal range of motion  Neck supple     Cardiovascular: Normal rate, regular rhythm and normal heart sounds  Exam reveals no gallop and no friction rub  No murmur heard  Pulmonary/Chest: Effort normal  No accessory muscle usage  No tachypnea  No respiratory distress  He has decreased breath sounds in the right upper field, the right middle field, the right lower field, the left upper field, the left middle field and the left lower field  He has no wheezes  He has no rhonchi  He has no rales  He exhibits no tenderness  Mildly decreased breath sounds particularly in the bases  Currently on 3 L continuous oxygen on bottle  Was 93% ambulatory on 3 L intermittent   Abdominal: Soft  Bowel sounds are normal    Musculoskeletal: Normal range of motion  He exhibits no edema  Neurological: He is alert and oriented to person, place, and time  Skin: Skin is warm and dry  Psychiatric: He has a normal mood and affect         Lab Review:   Admission on 01/26/2020, Discharged on 01/30/2020   Component Date Value    WBC 01/26/2020 6 80     RBC 01/26/2020 4 04     Hemoglobin 01/26/2020 13 4     Hematocrit 01/26/2020 38 4     MCV 01/26/2020 95     MCH 01/26/2020 33 2     MCHC 01/26/2020 34 9     RDW 01/26/2020 14 6     MPV 01/26/2020 9 1     Platelets 63/42/0269 200     nRBC 01/26/2020 0     Neutrophils Relative 01/26/2020 54     Immat GRANS % 01/26/2020 1     Lymphocytes Relative 01/26/2020 29     Monocytes Relative 01/26/2020 12     Eosinophils Relative 01/26/2020 3     Basophils Relative 01/26/2020 1     Neutrophils Absolute 01/26/2020 3 71     Immature Grans Absolute 01/26/2020 0 04     Lymphocytes Absolute 01/26/2020 1 97     Monocytes Absolute 01/26/2020 0 78     Eosinophils Absolute 01/26/2020 0 23     Basophils Absolute 01/26/2020 0 07     Protime 01/26/2020 10 9     INR 01/26/2020 1 01     PTT 01/26/2020 23     Sodium 01/26/2020 140     Potassium 01/26/2020 3 6     Chloride 01/26/2020 101     CO2 01/26/2020 31     ANION GAP 01/26/2020 8     BUN 01/26/2020 17     Creatinine 01/26/2020 1 87*    Glucose 01/26/2020 111     Calcium 01/26/2020 10 2*    AST 01/26/2020 30     ALT 01/26/2020 40     Alkaline Phosphatase 01/26/2020 68     Total Protein 01/26/2020 7 0     Albumin 01/26/2020 3 9     Total Bilirubin 01/26/2020 1 20*    eGFR 01/26/2020 34     Magnesium 01/26/2020 2 0     Troponin I 01/26/2020 0 02     NT-proBNP 01/26/2020 120     Sodium 01/27/2020 135*    Potassium 01/27/2020 3 8     Chloride 01/27/2020 99*    CO2 01/27/2020 24     ANION GAP 01/27/2020 12     BUN 01/27/2020 20     Creatinine 01/27/2020 1 59*    Glucose 01/27/2020 154*    Calcium 01/27/2020 8 9     AST 01/27/2020 36     ALT 01/27/2020 42     Alkaline Phosphatase 01/27/2020 65     Total Protein 01/27/2020 6 9     Albumin 01/27/2020 3 5     Total Bilirubin 01/27/2020 1 00     eGFR 01/27/2020 42     Magnesium 01/27/2020 1 8     Phosphorus 01/27/2020 2 4     WBC 01/27/2020 7 54     RBC 01/27/2020 4 16     Hemoglobin 01/27/2020 13 6     Hematocrit 01/27/2020 39 0     MCV 01/27/2020 94     MCH 01/27/2020 32 7     MCHC 01/27/2020 34 9     RDW 01/27/2020 14 4     MPV 01/27/2020 8 5*    Platelets 43/32/2983 187     nRBC 01/27/2020 0     Neutrophils Relative 01/27/2020 83*    Immat GRANS % 01/27/2020 1     Lymphocytes Relative 01/27/2020 14     Monocytes Relative 01/27/2020 2*    Eosinophils Relative 01/27/2020 0     Basophils Relative 01/27/2020 0     Neutrophils Absolute 01/27/2020 6 26     Immature Grans Absolute 01/27/2020 0 09     Lymphocytes Absolute 01/27/2020 1 02     Monocytes Absolute 01/27/2020 0 15*    Eosinophils Absolute 01/27/2020 0 00     Basophils Absolute 01/27/2020 0 02     Ventricular Rate 01/26/2020 91     Atrial Rate 01/26/2020 91     LA Interval 01/26/2020 154     QRSD Interval 01/26/2020 104     QT Interval 01/26/2020 360     QTC Interval 01/26/2020 442     QRS Axis 01/26/2020 35     T Wave Axis 01/26/2020 -7     Sodium 01/28/2020 140     Potassium 01/28/2020 3 5     Chloride 01/28/2020 104     CO2 01/28/2020 26     ANION GAP 01/28/2020 10     BUN 01/28/2020 22     Creatinine 01/28/2020 1 34*    Glucose 01/28/2020 133     Calcium 01/28/2020 8 1*    AST 01/28/2020 24     ALT 01/28/2020 38     Alkaline Phosphatase 01/28/2020 57     Total Protein 01/28/2020 6 4     Albumin 01/28/2020 3 5     Total Bilirubin 01/28/2020 0 60     eGFR 01/28/2020 51     Magnesium 01/28/2020 2 0     WBC 01/28/2020 9 32     RBC 01/28/2020 3 61*    Hemoglobin 01/28/2020 12 1     Hematocrit 01/28/2020 34 2*    MCV 01/28/2020 95     MCH 01/28/2020 33 5     MCHC 01/28/2020 35 4     RDW 01/28/2020 14 6     MPV 01/28/2020 9 5     Platelets 73/10/0665 190     nRBC 01/28/2020 0     Neutrophils Relative 01/28/2020 83*    Immat GRANS % 01/28/2020 1     Lymphocytes Relative 01/28/2020 10*    Monocytes Relative 01/28/2020 6     Eosinophils Relative 01/28/2020 0     Basophils Relative 01/28/2020 0     Neutrophils Absolute 01/28/2020 7 76*    Immature Grans Absolute 01/28/2020 0 10     Lymphocytes Absolute 01/28/2020 0 93     Monocytes Absolute 01/28/2020 0 52     Eosinophils Absolute 01/28/2020 0 00     Basophils Absolute 01/28/2020 0 01     Phosphorus 01/28/2020 2 5     pH, Arterial 01/28/2020 7  [address] ART TC 01/28/2020 7 424     pCO2, Arterial 01/28/2020 37 7     PCO2 (TC) Arterial 01/28/2020 37 0     pO2, Arterial 01/28/2020 110 2     PO2 (TC) Arterial 01/28/2020 107 8     HCO3, Arterial 01/28/2020 23 8     Base Excess, Arterial 01/28/2020 -0 4     O2 Content, Arterial 01/28/2020 18 8     O2 HGB,Arterial  01/28/2020 97 1*    SOURCE 01/28/2020 Radial, Right     FLORENCE TEST 01/28/2020 Yes     Temperature 01/28/2020 97 8     Nasal Cannula 01/28/2020 3     Magnesium 01/29/2020 2 0     D-Dimer, Quant 01/29/2020 1 09*    Sodium 01/29/2020 142     Potassium 01/29/2020 3 3*    Chloride 01/29/2020 106     CO2 01/29/2020 28     ANION GAP 01/29/2020 8     BUN 01/29/2020 20     Creatinine 01/29/2020 1 13     Glucose 01/29/2020 90     Calcium 01/29/2020 7 7*    eGFR 01/29/2020 63     Magnesium 01/30/2020 2 1     Phosphorus 01/30/2020 2 7     Sodium 01/30/2020 138     Potassium 01/30/2020 4 2     Chloride 01/30/2020 103     CO2 01/30/2020 26     ANION GAP 01/30/2020 9     BUN 01/30/2020 17     Creatinine 01/30/2020 1 25     Glucose 01/30/2020 90     Calcium 01/30/2020 8 1*    eGFR 01/30/2020 56     WBC 01/30/2020 8 69     RBC 01/30/2020 4 01     Hemoglobin 01/30/2020 13 3     Hematocrit 01/30/2020 38 5     MCV 01/30/2020 96     MCH 01/30/2020 33 2     MCHC 01/30/2020 34 5     RDW 01/30/2020 14 8     MPV 01/30/2020 9 3     Platelets 00/83/7493 192     nRBC 01/30/2020 0     Neutrophils Relative 01/30/2020 54     Immat GRANS % 01/30/2020 1     Lymphocytes Relative 01/30/2020 33     Monocytes Relative 01/30/2020 9     Eosinophils Relative 01/30/2020 2     Basophils Relative 01/30/2020 1     Neutrophils Absolute 01/30/2020 4 69     Immature Grans Absolute 01/30/2020 0 09     Lymphocytes Absolute 01/30/2020 2 85     Monocytes Absolute 01/30/2020 0 81     Eosinophils Absolute 01/30/2020 0 21     Basophils Absolute 01/30/2020 0 04    Hospital Outpatient Visit on 01/24/2020   Component Date Value    POC Glucose 01/24/2020 114        Diagnostics:  No orders to display           CTA - CHEST WITH IV CONTRAST - PULMONARY ANGIOGRAM     INDICATION:   COPD with worsening shortness of breath for 2 days  Increasing chest tightness  Known lung nodule      COMPARISON: CT chest 12/4/2019 report of PET/CT 1/24/2020     TECHNIQUE: CTA examination of the chest was performed using angiographic technique according to a protocol specifically tailored to evaluate for pulmonary embolism  Axial, sagittal, and coronal 2D reformatted images were created from the source data and   submitted for interpretation    In addition, coronal 3D MIP postprocessing was performed on the acquisition scanner        Radiation dose length product (DLP) for this visit:  574 77 mGy-cm   This examination, like all CT scans performed in the [facility], was performed utilizing techniques to minimize radiation dose exposure, including the use of iterative   reconstruction and automated exposure control      IV Contrast:  85 mL of iohexol (OMNIPAQUE)     FINDINGS:     PULMONARY ARTERIAL TREE:  No change in chronic left lower lobe embolus burden  No evidence of acute embolic disease to the segmental level  Limited evaluation of the subsegmental vessels      LUNGS:    Emphysema as before  Fibrocalcific scarring left apex      8 x 7 x 10 mm spiculated left lower lobe nodule (series 2/137 and 61/104), on the December 4, 2019 study measured about 7 x 6 x 9 mm  Again this nodule is clearly larger than CT study in June 2019      There is a 6 mm tubular opacity in the anterior left apex which is new, presumably inflammatory pseudonodule      3 mm right upper lobe nodules series 601 image 113, stable      Right lower lobe bulla and subsegmental atelectasis or scarring      Small amount of mucoid material layering along the posterior right trachea      PLEURA:  Pleural calcification right upper lobe posteriorly  Previously seen left pleural effusion has resolved      HEART/GREAT VESSELS:  Atherosclerotic changes thoracic aorta and coronary arteries      MEDIASTINUM AND MIKE:  Unremarkable      CHEST WALL AND LOWER NECK:   Unremarkable      VISUALIZED STRUCTURES IN THE UPPER ABDOMEN:  Small left midpole renal cyst   Dense contrast seen within the splenic flexure  Suspected fatty liver      OSSEOUS STRUCTURES:  No acute fracture or destructive osseous lesion  Degenerative changes of the spine      IMPRESSION:     1  Apparent slight enlargement of spiculated left lower lobe 10 mm nodule    Although this did not demonstrate FDG avidity on recent PET/CT, this remains suspicious for malignancy  Nonemergent cardiothoracic surgical consultation advised if not   previously performed  At minimum, follow-up CT chest in 3 months is advised      2  No acute pulmonary embolus to the segmental level  No change in chronic left lower lobe pulmonary embolus      3  COPD  New 6 mm tubular opacity in the anterior left apex, presumably inflammatory pseudonodule  This can be reevaluated at follow-up imaging      The study was marked in epic for follow-up      Workstation performed: [id number]    PFT/MICHELLE:     ESS:    Xr Chest 1 View Portable    Result Date: 1/27/2020  Narrative: CHEST INDICATION:   short of breath  COMPARISON:  12/3/2019  EXAM PERFORMED/VIEWS:  XR CHEST PORTABLE FINDINGS: Cardiomediastinal silhouette appears unremarkable  The lungs are clear  No pneumothorax or pleural effusion  Osseous structures appear within normal limits for patient age  Impression: No acute cardiopulmonary disease  Workstation performed: [id number]     Xr Abdomen Obstruction Series    Result Date: 1/30/2020  Narrative: OBSTRUCTION SERIES INDICATION:   abdominal distenstion, find location of stool before colonoscopy  COMPARISON:  None EXAM PERFORMED/VIEWS:  XR ABDOMEN OBSTRUCTION SERIES FINDINGS: There is a nonobstructive bowel gas pattern  Residual barium contrast is seen throughout the colon  No free air beneath the hemidiaphragms  No pathologic calcifications or soft tissue masses evident  Osseous structures are unremarkable  Examination of the chest reveals a normal cardiomediastinal silhouette  Lungs are clear  Impression: Nonobstructive bowel gas pattern  Residual barium contrast seen throughout the colon  Workstation performed: [id number]     Fl Barium Swallow    Result Date: 1/28/2020  Narrative: BARIUM SWALLOW-ESOPHAGRAM INDICATION:   severe GERD   COMPARISON:  None IMAGES:  17 FLUOROSCOPY TIME:   1 9 min  TECHNIQUE: The patient was given effervescent granules and barium by mouth and images of the esophagus were obtained  FINDINGS: The esophagus is normal in caliber  Intermittent disruption of the primary peristaltic wave is noted with slightly delayed emptying of barium from the esophagus  Esophageal caliber is normal   Intermittent moderate gastroesophageal reflux is noted to the level of the aortic arch  A slightly granular esophageal mucosa is consistent with mild reflux esophagitis  No ulcer or stricture is appreciated  No fold thickening identified  A small hiatal hernia is present  The gastric cardia appears unremarkable  Impression: 1  Mild esophageal dysmotility  2   Moderate gastroesophageal reflux without evidence for mild reflux esophagitis  3   Small hiatal hernia  Workstation performed: [id number]     Nm Pet Ct Skull Base To Mid Thigh    Result Date: 1/24/2020  Narrative: PET/CT SCAN INDICATION: Enlarging lung nodule  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, bronchus and lung MODIFIER: PI COMPARISON: CT chest 12/4/2019 and additional priors CELL TYPE:  N/A TECHNIQUE:   8 3 mCi F-18-FDG administered IV  Multiplanar attenuation corrected and non attenuation corrected PET images were acquired 60 minutes post injection  Contiguous, low dose, axial CT sections were obtained from the skull vertex through the femurs   Intravenous contrast material was not utilized  This examination, like all CT scans performed in the [facility], was performed utilizing techniques to minimize radiation dose exposure, including the use of iterative reconstruction and automated exposure control  Fasting serum glucose: 114 mg/dl FINDINGS: VISUALIZED BRAIN:   No acute abnormalities are seen  HEAD/NECK:   There is a physiologic distribution of FDG  No FDG avid cervical adenopathy is seen  CT images: Unremarkable  CHEST:   No focal FDG uptake in the left lower lobe nodule  On the current exam this measures up to 8 mm in size, image 132 series 3    This was believed to be a 2 mm nodule on the 3/30/2018 exam   This appears new from CT of 12/4/2013  No suspicious focal FDG uptake in the mediastinal or perihilar regions  Linear FDG uptake in the esophagus may be physiologic or related to reflux  CT images: Scattered calcified pleural plaque formation  Underlying emphysematous changes of the lung fields  ABDOMEN:   Somewhat focal FDG uptake at the level of the distal descending colon, SUV max of 3 4  This is more pronounced on the Q clear images, SUV max of 6 0  Suggestion of circumferential wall thickening here on CT  See image 233 series 1202 of the PET/CT fusion images  Otherwise heterogeneous bowel activity is likely physiologic  CT images: There are a few renal cysts  2 mm calculus in the right kidney lower pole  Scattered colonic diverticulosis  PELVIS: No FDG avid soft tissue lesions are seen  CT images: Prostate is mildly prominent  OSSEOUS STRUCTURES: No FDG avid lesions are seen  CT images: Multilevel degenerative spurring of the spine  Impression: 1  No focal FDG uptake in the left lower lobe nodule  This may be at the lower limits of resolution however  Given interval enlargement of this nodule over time, malignancy is not excluded  Recommend continued surveillance with chest CT if tissue sampling is not performed  A follow up chest CT could be performed in 3 months  2   Somewhat focal FDG uptake at the level of the distal descending colon, underlying lesion is not excluded  Correlate with colonoscopy  The study was marked in EPIC for significant notification  Workstation performed: [id number]     Cta Chest Pe Study    Result Date: 1/29/2020  Narrative: CTA - CHEST WITH IV CONTRAST - PULMONARY ANGIOGRAM INDICATION:   COPD with worsening shortness of breath for 2 days  Increasing chest tightness  Known lung nodule   COMPARISON: CT chest 12/4/2019 report of PET/CT 1/24/2020 TECHNIQUE: CTA examination of the chest was performed using angiographic technique according to a protocol specifically tailored to evaluate for pulmonary embolism  Axial, sagittal, and coronal 2D reformatted images were created from the source data and  submitted for interpretation  In addition, coronal 3D MIP postprocessing was performed on the acquisition scanner  Radiation dose length product (DLP) for this visit:  574 77 mGy-cm   This examination, like all CT scans performed in the [facility], was performed utilizing techniques to minimize radiation dose exposure, including the use of iterative  reconstruction and automated exposure control  IV Contrast:  85 mL of iohexol (OMNIPAQUE)  FINDINGS: PULMONARY ARTERIAL TREE:  No change in chronic left lower lobe embolus burden  No evidence of acute embolic disease to the segmental level  Limited evaluation of the subsegmental vessels  LUNGS:  Emphysema as before  Fibrocalcific scarring left apex  8 x 7 x 10 mm spiculated left lower lobe nodule (series 2/137 and 61/104), on the December 4, 2019 study measured about 7 x 6 x 9 mm  Again this nodule is clearly larger than CT study in June 2019  There is a 6 mm tubular opacity in the anterior left apex which is new, presumably inflammatory pseudonodule  3 mm right upper lobe nodules series 601 image 113, stable  Right lower lobe bulla and subsegmental atelectasis or scarring  Small amount of mucoid material layering along the posterior right trachea  PLEURA:  Pleural calcification right upper lobe posteriorly  Previously seen left pleural effusion has resolved  HEART/GREAT VESSELS:  Atherosclerotic changes thoracic aorta and coronary arteries  MEDIASTINUM AND MIKE:  Unremarkable  CHEST WALL AND LOWER NECK:   Unremarkable  VISUALIZED STRUCTURES IN THE UPPER ABDOMEN:  Small left midpole renal cyst  Dense contrast seen within the splenic flexure  Suspected fatty liver  OSSEOUS STRUCTURES:  No acute fracture or destructive osseous lesion    Degenerative changes of the spine      Impression: 1  Apparent slight enlargement of spiculated left lower lobe 10 mm nodule  Although this did not demonstrate FDG avidity on recent PET/CT, this remains suspicious for malignancy  Nonemergent cardiothoracic surgical consultation advised if not previously performed  At minimum, follow-up CT chest in 3 months is advised  2   No acute pulmonary embolus to the segmental level  No change in chronic left lower lobe pulmonary embolus  3   COPD  New 6 mm tubular opacity in the anterior left apex, presumably inflammatory pseudonodule  This can be reevaluated at follow-up imaging  The study was marked in epic for follow-up  Workstation performed: [id number]       EKG/ECHO:      Portions of the record may have been created with voice recognition software  Occasional wrong word or "sound a like" substitutions may have occurred due to the inherent limitations of voice recognition software  Read the chart carefully and recognize, using context, where substitutions have occurred      Electronically Signed by Lisa Leger PA-C

## 2020-02-13 NOTE — PROGRESS NOTES
Pulmonary Rehabilitation Plan of Care   Care Plan       Today's date: 2020   Visits: Initial   Patient name: Brodie Smallwood      : 1945  Age: 67 y o  MRN: [id number]  Referring Physician: Maldonado Holguin PA-C  Pulmonologist: Abelardo Mtz  Provider: Silva Piper  Clinician: Mahad Mantilla RN    Dx:   Encounter Diagnosis   Name Primary?  COPD with acute exacerbation ([facility] [address] )      Date of onset: 2019      SUMMARY OF PROGRESS:  Completed initial evaluation  Explained  Pulmonary rehabilitation, how the lungs functions, breathing retraining and exercise  Completed 6 minute walk, arm curl and chair to stand test  Obtained BMI, body fat% and waist measurements  Telemetry monitor NSR at rest and with exercise  RPE 5-6 RPD 2-6  Completed 680 Feet of 6 minute walk test  Resting oxygenation rate was 96% on 4L/min of oxygen via nasal canula and during peak exercise 95-96%  He had to stop one time during six minute walk test  Short of breath and fatigue with minimal exertion  Will continue to monitor  Medication compliance: Yes   Comments: states he is taking his medication as prescribed  Fall Risk: High   Comments: leg fatigue, short of breath and fatigue with minimal exertion    EKG changes: NSR      EXERCISE ASSESSMENT and PLAN    Current Exercise Program in Rehab:       Frequency: 1 days/week        Minutes: 16         METS: 2            HR:    RPE: 5-6         Modalities: Room walking      Exercise Progression 30 Day Goals :    Frequency: 2 days/week   Minutes: 31-45   METS: 2 0-3 0   HR:    RPE: 4-6   Modalities: Treadmill, UBE, NuStep and Recumbent bike    Strength trainin-3 days / week  12-15 repetitions  1-2 sets per modality    Modalities: Lateral Raise, Arm Curl, Upright Rows, Front Raises and Shoulder Shrugs    Progressing:   In Progress    Home Exercise: increase endurance to return to playing billiards, shuffle board and return to shopping for clothes    Goals: increase endurance to return to playing [employer] board and return to shopping for clothes  Education: Benefit of exercise for CAD risk factors, home exercise guidelines, signs and sxs and RPE scale   Plan:increase endurance to continue to walk at home  Readiness to change: Preparation:  (Getting ready to change)       NUTRITION ASSESSMENT AND PLAN    Weight control:    Starting weight: 192 8   Current weight: Weight - Scale: 87 3 kg (192 lb 8 oz)   Waist circumference:    Startin 5   Current:    Diabetes: N/A  Lipid management: Discussed diet and lipid management and Last lipid profile 2019  Chol 167  TRG 66  HDL 40    Goals:no change at this time  Education: heart healthy diet  Progressing:No  Plan: Mr Birdie Piedra refuse to make dietary changes at this time  Readiness to change: Pre-Contemplation:   (Not yet acknowledging that there is a problem behavior that needs to change)      PSYCHOSOCIAL ASSESSMENT AND PLAN    Emotional:  Depression assessment:  PHQ-9 = 10-14 = Moderate Depression            Anxiety measure:  PAULINA-7 = 0-4  = Not anxious  Self-reported stress level: 5   Social support: Good   Goals:  PHQ-9 - reduced severity by one level, continue medical therapy, improved positive thoughts of well being and increased energy  Education: signs/sxs of depression, benefits of positive support system, coping mechanisms and states he follows up regularly with primary MD in regards to depression  Progressing: In Progress  Plan: Practice relaxation techniques and continue to follow up with MD and take medication as prescribed  Readiness to change: Action:  (Changing behavior)      OTHER CORE COMPONENTS     Tobacco:   Social History     Tobacco Use   Smoking Status Former Smoker    Packs/day: 1 00    Years: 60 00    Pack years: 60 00    Last attempt to quit: 2017    Years since quittin 4   Smokeless Tobacco Never Used   Tobacco Comment    quit in 2017       Tobacco Use Intervention: Referral to tobacco expert:   N/A    Blood pressure:    Restin/78   Exercise: 140/74    Goals: consistent BP < 130/80  Education:  understanding HTN and CAD and low sodium diet and HTN  Progressing: In Progress  Plan: Class: Understanding Heart Disease and Class: Common Heart Medications  Readiness to change: Action:  (Changing behavior)

## 2020-02-13 NOTE — TELEPHONE ENCOUNTER
Pt requesting form from [employer] that were dropped off to be completed, would like a sienna when ready

## 2020-02-17 ENCOUNTER — TELEPHONE (OUTPATIENT)
Dept: FAMILY MEDICINE CLINIC | Facility: CLINIC | Age: 75
End: 2020-02-17

## 2020-02-17 NOTE — TELEPHONE ENCOUNTER
Received faxed forms for Plan of care corrections/orders for patient   Blank copies in chart & placed in Red team bin

## 2020-02-18 ENCOUNTER — CLINICAL SUPPORT (OUTPATIENT)
Dept: PULMONOLOGY | Facility: CLINIC | Age: 75
End: 2020-02-18
Payer: MEDICARE

## 2020-02-18 DIAGNOSIS — J43.2 CENTRILOBULAR EMPHYSEMA (HCC): ICD-10-CM

## 2020-02-18 PROCEDURE — G0424 PULMONARY REHAB W EXER: HCPCS

## 2020-02-20 ENCOUNTER — CLINICAL SUPPORT (OUTPATIENT)
Dept: PULMONOLOGY | Facility: CLINIC | Age: 75
End: 2020-02-20
Payer: MEDICARE

## 2020-02-20 DIAGNOSIS — J43.2 CENTRILOBULAR EMPHYSEMA (HCC): ICD-10-CM

## 2020-02-20 PROCEDURE — G0424 PULMONARY REHAB W EXER: HCPCS

## 2020-02-21 ENCOUNTER — TRANSCRIBE ORDERS (OUTPATIENT)
Dept: ADMINISTRATIVE | Facility: HOSPITAL | Age: 75
End: 2020-02-21

## 2020-02-21 ENCOUNTER — TELEPHONE (OUTPATIENT)
Dept: FAMILY MEDICINE CLINIC | Facility: CLINIC | Age: 75
End: 2020-02-21

## 2020-02-21 ENCOUNTER — HOSPITAL ENCOUNTER (OUTPATIENT)
Dept: RADIOLOGY | Facility: HOSPITAL | Age: 75
Discharge: HOME | End: 2020-02-21
Attending: FAMILY MEDICINE
Payer: MEDICARE

## 2020-02-21 ENCOUNTER — APPOINTMENT (OUTPATIENT)
Dept: LAB | Facility: HOSPITAL | Age: 75
End: 2020-02-21
Attending: FAMILY MEDICINE
Payer: MEDICARE

## 2020-02-21 ENCOUNTER — OFFICE VISIT (OUTPATIENT)
Dept: FAMILY MEDICINE CLINIC | Facility: CLINIC | Age: 75
End: 2020-02-21
Payer: MEDICARE

## 2020-02-21 VITALS
WEIGHT: 191 LBS | HEIGHT: 77 IN | BODY MASS INDEX: 22.55 KG/M2 | RESPIRATION RATE: 16 BRPM | SYSTOLIC BLOOD PRESSURE: 120 MMHG | OXYGEN SATURATION: 98 % | TEMPERATURE: 97.8 F | DIASTOLIC BLOOD PRESSURE: 62 MMHG | HEART RATE: 93 BPM

## 2020-02-21 DIAGNOSIS — J44.1 COPD EXACERBATION (HCC): Primary | ICD-10-CM

## 2020-02-21 DIAGNOSIS — I10 ESSENTIAL HYPERTENSION: ICD-10-CM

## 2020-02-21 DIAGNOSIS — G47.01 INSOMNIA DUE TO MEDICAL CONDITION: ICD-10-CM

## 2020-02-21 DIAGNOSIS — J44.1 COPD EXACERBATION (HCC): ICD-10-CM

## 2020-02-21 DIAGNOSIS — E88.01 AAT (ALPHA-1-ANTITRYPSIN) DEFICIENCY (HCC): ICD-10-CM

## 2020-02-21 DIAGNOSIS — I27.20 PULMONARY HYPERTENSION (HCC): ICD-10-CM

## 2020-02-21 LAB
ALBUMIN SERPL BCP-MCNC: 3.9 G/DL (ref 3.5–5)
ALP SERPL-CCNC: 67 U/L (ref 46–116)
ALT SERPL W P-5'-P-CCNC: 38 U/L (ref 12–78)
ANION GAP SERPL CALCULATED.3IONS-SCNC: 9 MMOL/L (ref 4–13)
AST SERPL W P-5'-P-CCNC: 23 U/L (ref 5–45)
BASOPHILS # BLD AUTO: 0.06 THOUSANDS/ΜL (ref 0–0.1)
BASOPHILS NFR BLD AUTO: 1 % (ref 0–1)
BILIRUB SERPL-MCNC: 1.1 MG/DL (ref 0.2–1)
BUN SERPL-MCNC: 13 MG/DL (ref 5–25)
CALCIUM SERPL-MCNC: 9.1 MG/DL (ref 8.3–10.1)
CHLORIDE SERPL-SCNC: 102 MMOL/L (ref 100–108)
CO2 SERPL-SCNC: 29 MMOL/L (ref 21–32)
CREAT SERPL-MCNC: 1.44 MG/DL (ref 0.6–1.3)
CRP SERPL QL: 3.7 MG/L
EOSINOPHIL # BLD AUTO: 0.39 THOUSAND/ΜL (ref 0–0.61)
EOSINOPHIL NFR BLD AUTO: 6 % (ref 0–6)
ERYTHROCYTE [DISTWIDTH] IN BLOOD BY AUTOMATED COUNT: 13.2 % (ref 11.6–15.1)
GFR SERPL CREATININE-BSD FRML MDRD: 47 ML/MIN/1.73SQ M
GLUCOSE SERPL-MCNC: 93 MG/DL (ref 65–140)
HCT VFR BLD AUTO: 38.8 % (ref 36.5–49.3)
HGB BLD-MCNC: 13 G/DL (ref 12–17)
IMM GRANULOCYTES # BLD AUTO: 0.03 THOUSAND/UL (ref 0–0.2)
IMM GRANULOCYTES NFR BLD AUTO: 0 % (ref 0–2)
LYMPHOCYTES # BLD AUTO: 1.54 THOUSANDS/ΜL (ref 0.6–4.47)
LYMPHOCYTES NFR BLD AUTO: 22 % (ref 14–44)
MCH RBC QN AUTO: 32.7 PG (ref 26.8–34.3)
MCHC RBC AUTO-ENTMCNC: 33.5 G/DL (ref 31.4–37.4)
MCV RBC AUTO: 98 FL (ref 82–98)
MONOCYTES # BLD AUTO: 0.74 THOUSAND/ΜL (ref 0.17–1.22)
MONOCYTES NFR BLD AUTO: 11 % (ref 4–12)
NEUTROPHILS # BLD AUTO: 4.17 THOUSANDS/ΜL (ref 1.85–7.62)
NEUTS SEG NFR BLD AUTO: 60 % (ref 43–75)
NRBC BLD AUTO-RTO: 0 /100 WBCS
PLATELET # BLD AUTO: 221 THOUSANDS/UL (ref 149–390)
PMV BLD AUTO: 8.9 FL (ref 8.9–12.7)
POTASSIUM SERPL-SCNC: 3.8 MMOL/L (ref 3.5–5.3)
PROT SERPL-MCNC: 7.1 G/DL (ref 6.4–8.2)
RBC # BLD AUTO: 3.97 MILLION/UL (ref 3.88–5.62)
SODIUM SERPL-SCNC: 140 MMOL/L (ref 136–145)
WBC # BLD AUTO: 6.93 THOUSAND/UL (ref 4.31–10.16)

## 2020-02-21 PROCEDURE — 36415 COLL VENOUS BLD VENIPUNCTURE: CPT

## 2020-02-21 PROCEDURE — 4040F PNEUMOC VAC/ADMIN/RCVD: CPT | Performed by: FAMILY MEDICINE

## 2020-02-21 PROCEDURE — 3078F DIAST BP <80 MM HG: CPT | Performed by: FAMILY MEDICINE

## 2020-02-21 PROCEDURE — 1111F DSCHRG MED/CURRENT MED MERGE: CPT | Performed by: FAMILY MEDICINE

## 2020-02-21 PROCEDURE — 86140 C-REACTIVE PROTEIN: CPT

## 2020-02-21 PROCEDURE — 3008F BODY MASS INDEX DOCD: CPT | Performed by: FAMILY MEDICINE

## 2020-02-21 PROCEDURE — 71046 X-RAY EXAM CHEST 2 VIEWS: CPT

## 2020-02-21 PROCEDURE — 80053 COMPREHEN METABOLIC PANEL: CPT

## 2020-02-21 PROCEDURE — 85025 COMPLETE CBC W/AUTO DIFF WBC: CPT

## 2020-02-21 PROCEDURE — 1036F TOBACCO NON-USER: CPT | Performed by: FAMILY MEDICINE

## 2020-02-21 PROCEDURE — 99214 OFFICE O/P EST MOD 30 MIN: CPT | Performed by: FAMILY MEDICINE

## 2020-02-21 PROCEDURE — 1160F RVW MEDS BY RX/DR IN RCRD: CPT | Performed by: FAMILY MEDICINE

## 2020-02-21 PROCEDURE — 3074F SYST BP LT 130 MM HG: CPT | Performed by: FAMILY MEDICINE

## 2020-02-21 RX ORDER — ZOLPIDEM TARTRATE 5 MG/1
5 TABLET ORAL
Qty: 30 TABLET | Refills: 0 | Status: SHIPPED | OUTPATIENT
Start: 2020-02-21 | End: 2020-04-15

## 2020-02-21 NOTE — TELEPHONE ENCOUNTER
Patient dropped off forms for insurance/hospilaition stay off to be filled out by doctor  Blank copy is scanned in documents  Please fill out original copy and notify patient once complete  Form is in [employer]  Thank you

## 2020-02-21 NOTE — PROGRESS NOTES
SUBJECTIVE:  Chief complaint and HPI: Erin Sethi is a 67 y o  male who presents for follow up of multiple chronic medical problems, as listed below in problem list  All problems are relatively stable except for the following issues: 4 day h/o nausea, vomiting sometimes, some chills and increased SOB  On oxygen for severe COPD  Has lost weight  Has chronic diarrhea X decades, no change  Urination not changed  Review of systems:  No fever  No chest pain/shortness of breath  No change in digestion or bowel movements - has chronic diarrhea  No change in urination  No new musculoskeletal or neurological symptoms      Chart reviewed for relevant medical, surgical and psychosocial history, medications and allergies, labs and studies      Patient Active Problem List   Diagnosis    AAT (alpha-1-antitrypsin) deficiency (HCC)    Gastroesophageal reflux disease    Causalgia of lower limb    Essential hypertension    BPH (benign prostatic hyperplasia)    Liver disease    Lung nodules    Former smoker    Chest pain at rest    Lung mass    Pulmonary embolus ([address]    Chronic respiratory failure with hypoxia ([address] )    CLAYTON (acute kidney injury) ([address] )    Centrilobular emphysema ([address]    Lightheadedness    Weakness generalized    Elevated PSA    Skin lesion of right lower extremity    Herpes labialis    Pain and swelling of left lower extremity    Chronic venous insufficiency    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 (HCC)    Insomnia    Pulmonary hypertension (HCC)    Cerumen impaction    Transaminitis    Other constipation    Ambulatory dysfunction    Dysphagia    Allergic rhinitis    Balance problem    Benign colon polyp    Cataracts, both eyes    Chronic diarrhea of unknown origin    Nocturnal leg cramps    Peripheral neuropathy    Bilateral leg edema    Thrombocytopenia (HCC)             EXAM:  /62 (BP Location: Left arm, Patient Position: Sitting, Cuff Size: Large)   Pulse 93   Temp 97 8 °F (36 6 °C) (Tympanic)   Resp 16   Ht 6' 5" (1 956 m)   Wt 86 6 kg (191 lb)   SpO2 98%   BMI 22 65 kg/m²     The patient appears well, in mild respiratory distress, on O2 with deep breathes  Alert and oriented times three, pleasant and cooperative  Vital signs are as noted by the nurse  Head: normocephalic, atraumatic  Eyes: well perfused conjunctiva, not clinically pale, not jaundiced  Ears: external ear: no gross lesions  TMs without inflammation  Nose: no rhinorrhea  Throat not inflamed  Tongue coated with central fissure  Neck: supple, trachea in the midline and no concerning cervical lymphadenopathy  Lungs:+ respiratory labor  Clear to auscultation  Heart: Distant  Regular rate and rhythm, no murmurs, gallops or rubs  Abdomen: Benign: soft, non-tender, non-distended  No guarding or rebound  No masses or organomegaly  Normal bowel sounds,   Skin: No pallor  No rashes noted  Extremities: Moves all extremities normally  No pedal edema      ASSESSMENT/PLAN:  1  COPD exacerbation (HCC)  Continue O2, increase flow if needed for a few days  Same respiratory meds  - XR chest pa & lateral; Future  - CBC and differential; Future  - Comprehensive metabolic panel; Future  - C-reactive protein; Future    Unclear cause  May have a mild virus  No exam findings suggesting obvious exacerbating factor    To ER if worse    2  AAT (alpha-1-antitrypsin) deficiency (HCC)    - Comprehensive metabolic panel; Future    3  Pulmonary hypertension ([address] )      4  Essential hypertension  stable  - Comprehensive metabolic panel; Future    I decreased his Ambien to 5 mg given age and severe COPD  Reviewed and reinforced basics of healthy lifestyle  Risks and benefits of therapeutic plan discussed, answered all patient questions and concerns and patient expressed understanding and agreement of therapeutic plan        Follow up plan: 1 months, earlier prn

## 2020-02-25 ENCOUNTER — CLINICAL SUPPORT (OUTPATIENT)
Dept: PULMONOLOGY | Facility: CLINIC | Age: 75
End: 2020-02-25
Payer: MEDICARE

## 2020-02-25 DIAGNOSIS — J44.1 COPD EXACERBATION (HCC): ICD-10-CM

## 2020-02-25 DIAGNOSIS — J43.2 CENTRILOBULAR EMPHYSEMA (HCC): ICD-10-CM

## 2020-02-25 PROCEDURE — G0424 PULMONARY REHAB W EXER: HCPCS

## 2020-02-26 ENCOUNTER — PATIENT OUTREACH (OUTPATIENT)
Dept: FAMILY MEDICINE CLINIC | Facility: CLINIC | Age: 75
End: 2020-02-26

## 2020-02-26 ENCOUNTER — TELEPHONE (OUTPATIENT)
Dept: FAMILY MEDICINE CLINIC | Facility: CLINIC | Age: 75
End: 2020-02-26

## 2020-02-26 DIAGNOSIS — J96.11 CHRONIC RESPIRATORY FAILURE WITH HYPOXIA (HCC): Primary | ICD-10-CM

## 2020-02-26 DIAGNOSIS — I27.20 PULMONARY HYPERTENSION (HCC): ICD-10-CM

## 2020-02-26 DIAGNOSIS — J43.2 CENTRILOBULAR EMPHYSEMA (HCC): ICD-10-CM

## 2020-02-26 NOTE — PROGRESS NOTES
CM outreach to both patient and daughter  Pt has worked with CM in the past  CM contact information provided  CM explained role in managing patients care  Pt lives in adult community  Has friends that assist when needed  Daughter is involved in assisting patient in managing his care  Daughter believes patient would benefit from a shower chair and elevated toilet seat  Pt resistant to receiving any DME  Encouraged patient to try shower chair for stability  Agrees to shower chair  CM will outreach [place] [facility]  as follow up  Jeaneth Kirkland was previously involved in patient care  Pt states he is driving to pulmonary rehab without difficulty  CM will continue to monitor and assist      In basket message sent to Jeaneth Kirkland Respiratory CM       Order for shower chair faxed to [facility]

## 2020-02-27 ENCOUNTER — CLINICAL SUPPORT (OUTPATIENT)
Dept: PULMONOLOGY | Facility: CLINIC | Age: 75
End: 2020-02-27
Payer: MEDICARE

## 2020-02-27 DIAGNOSIS — J44.1 COPD EXACERBATION (HCC): ICD-10-CM

## 2020-02-27 PROCEDURE — G0424 PULMONARY REHAB W EXER: HCPCS

## 2020-02-27 NOTE — TELEPHONE ENCOUNTER
[facility]  Copy scanned into chart & original placed in Red team bin

## 2020-03-03 ENCOUNTER — PATIENT OUTREACH (OUTPATIENT)
Dept: CASE MANAGEMENT | Facility: HOSPITAL | Age: 75
End: 2020-03-03

## 2020-03-03 ENCOUNTER — CLINICAL SUPPORT (OUTPATIENT)
Dept: PULMONOLOGY | Facility: CLINIC | Age: 75
End: 2020-03-03
Payer: MEDICARE

## 2020-03-03 DIAGNOSIS — J44.1 COPD EXACERBATION (HCC): ICD-10-CM

## 2020-03-03 PROCEDURE — G0424 PULMONARY REHAB W EXER: HCPCS

## 2020-03-03 NOTE — PROGRESS NOTES
Patient was contacted to review his respiratory status, Pulmonary Rehab, O2 use and general follow-up  Dae Gresham feels that Pulm Rehab is difficult but knows it is good for him  We discussed the continued efforts after rehab is complete to exercise consistently at home  He is having difficulty managing the "D" cylinders, due to the extra weight  He had previously used the Inogen but had to replace it when his need for O2 increased to 5 L with ambulation (RT sent a message to [facility] after this call to check on the possibility of exchanging the portability system)  He currently limits his out of the house activities  Rich is also complaining of a dry nose  He uses continuous O2 at 3 lpm at rest while at home, but does not have a humidifier bottle  Outreach to [facility] after this call included the request for a humidifier bottle  Dae Gresham is aware that Dr Cheryl Joshi may have to write a script and FAX to [place]  He has an appt with Dr Simba Lyles on 3/13  There was concern about the raised toilet seat and shower bench  Dae Gresham is aware that these items are uncovered by insurance  RT advised him to have his daughter check [employer] or [employer] for better out of pocket prices  Outreach will continue when answers received from [facility]  UPDATE: Message received from [facility] that Dae Gresham returned the Inogen for the D cylinders  Message sent to Benjie Del Toro PA-C to request script for humidifier bottle and consider the Inogen G3 at 5 lpm or M6 tanks in place of the D tanks

## 2020-03-05 ENCOUNTER — CLINICAL SUPPORT (OUTPATIENT)
Dept: PULMONOLOGY | Facility: CLINIC | Age: 75
End: 2020-03-05
Payer: MEDICARE

## 2020-03-05 DIAGNOSIS — J44.1 COPD EXACERBATION (HCC): ICD-10-CM

## 2020-03-05 PROCEDURE — G0424 PULMONARY REHAB W EXER: HCPCS

## 2020-03-10 ENCOUNTER — OFFICE VISIT (OUTPATIENT)
Dept: FAMILY MEDICINE CLINIC | Facility: CLINIC | Age: 75
End: 2020-03-10
Payer: MEDICARE

## 2020-03-10 ENCOUNTER — CLINICAL SUPPORT (OUTPATIENT)
Dept: PULMONOLOGY | Facility: CLINIC | Age: 75
End: 2020-03-10
Payer: MEDICARE

## 2020-03-10 ENCOUNTER — PATIENT OUTREACH (OUTPATIENT)
Dept: FAMILY MEDICINE CLINIC | Facility: CLINIC | Age: 75
End: 2020-03-10

## 2020-03-10 VITALS
HEART RATE: 87 BPM | OXYGEN SATURATION: 94 % | HEIGHT: 77 IN | SYSTOLIC BLOOD PRESSURE: 112 MMHG | BODY MASS INDEX: 22.63 KG/M2 | DIASTOLIC BLOOD PRESSURE: 60 MMHG | WEIGHT: 191.7 LBS | RESPIRATION RATE: 22 BRPM

## 2020-03-10 DIAGNOSIS — J43.2 CENTRILOBULAR EMPHYSEMA (HCC): ICD-10-CM

## 2020-03-10 DIAGNOSIS — E88.01 AAT (ALPHA-1-ANTITRYPSIN) DEFICIENCY (HCC): ICD-10-CM

## 2020-03-10 DIAGNOSIS — L81.9 PIGMENTED SKIN LESION: Primary | ICD-10-CM

## 2020-03-10 DIAGNOSIS — J44.1 COPD EXACERBATION (HCC): ICD-10-CM

## 2020-03-10 PROCEDURE — G0424 PULMONARY REHAB W EXER: HCPCS

## 2020-03-10 PROCEDURE — 3079F DIAST BP 80-89 MM HG: CPT | Performed by: FAMILY MEDICINE

## 2020-03-10 PROCEDURE — 1036F TOBACCO NON-USER: CPT | Performed by: FAMILY MEDICINE

## 2020-03-10 PROCEDURE — 4040F PNEUMOC VAC/ADMIN/RCVD: CPT | Performed by: FAMILY MEDICINE

## 2020-03-10 PROCEDURE — 1160F RVW MEDS BY RX/DR IN RCRD: CPT | Performed by: FAMILY MEDICINE

## 2020-03-10 PROCEDURE — 99213 OFFICE O/P EST LOW 20 MIN: CPT | Performed by: FAMILY MEDICINE

## 2020-03-10 PROCEDURE — 1111F DSCHRG MED/CURRENT MED MERGE: CPT | Performed by: FAMILY MEDICINE

## 2020-03-10 PROCEDURE — 3077F SYST BP >= 140 MM HG: CPT | Performed by: FAMILY MEDICINE

## 2020-03-10 NOTE — PROGRESS NOTES
Met with patient during PCP visit  Pt states he is going to pulmonary rehab as directed  C/O epigastric pain due to hiatal hernia  Advised patient to address with Dr Chandler Castillo  Encouraged patient to stay away from anyone who is sick or has any upper respiratory symptoms  Verbalizes understanding  Reviewed precautionary measures  that should be taken to prevent getting any type of infection  Voices no other complaints  CM will continue to follow and monitor

## 2020-03-10 NOTE — PROGRESS NOTES
Pulmonary Rehabilitation Plan of Care   30 day       Today's date: 3/10/2020   Visits: 7  Patient name: Ivania Huddleston      : 1945  Age: 67 y o  MRN: [id number]  Referring Physician: Ramez Grubbs PA-C  Pulmonologist: Elza Wall  Provider: [facility]  Clinician: Gricel Mantilla RN    Dx:   Encounter Diagnosis   Name Primary?  COPD exacerbation [facility])      Date of onset: 2019      SUMMARY OF PROGRESS:  Mr Javier Palumbo completed 7 sessions of the pulmonary rehabilitation program  He completes 54 minutes of cardiovascular exercise with a light weight strength training component  His exercise MET level increased from 2 0 to 2 4 MET's  RPE 3-6 RPD 2-6  Completed 680 Feet of 6 minute walk test  Resting oxygenation rate was 96-99% on 4L/min of oxygen via nasal canula and during peak exercise 93-99%  He occasionally complains of a light headed feeling  Short of breath and fatigue with minimal exertion  He noticed an increase in endurance to return to [facility] for 15 minutes  He has had face to face visit with pulmonologist  Will continue to monitor  Medication compliance: Yes   Comments: states he is taking his medication as prescribed  Fall Risk: High   Comments: leg fatigue, short of breath and fatigue with minimal exertion    EKG changes: NSR      EXERCISE ASSESSMENT and PLAN    Current Exercise Program in Rehab:       Frequency: 2 days/week        Minutes: 53-54         METS: 2-2 4            HR:    RPE: 3-6         Modalities: Treadmill, UBE, NuStep and Recumbent bike      Exercise Progression 30 Day Goals :    Frequency: 2 days/week   Minutes: 31-45   METS: 2 0-3 0   HR:    RPE: 4-6   Modalities: Treadmill, UBE, NuStep and Recumbent bike    Strength trainin-3 days / week  12-15 repetitions  1-2 sets per modality    Modalities: Lateral Raise, Arm Curl, Upright Rows, Front Raises and Shoulder Shrugs    Progressing:   In Progress    Home Exercise: increase endurance to return to playing billiards, shuffle board and return to shopping for clothes    Goals:  increase endurance to return to playing [employer] board and return to shopping for clothes  Education: Benefit of exercise for CAD risk factors, home exercise guidelines, signs and sxs and RPE scale   Plan:increase endurance to continue to walk at home  Readiness to change: Preparation:  (Getting ready to change)       NUTRITION ASSESSMENT AND PLAN    Weight control:    Starting weight: 192 8   Current weight:     Waist circumference:    Startin 5   Current:    Diabetes: N/A  Lipid management: Discussed diet and lipid management and Last lipid profile 2019  Chol 167  TRG 66  HDL 40    Goals:no change at this time  Education: heart healthy diet  Progressing:No  Plan: Mr Yu Valdivia refuse to make dietary changes at this time  Readiness to change: Pre-Contemplation:   (Not yet acknowledging that there is a problem behavior that needs to change)      PSYCHOSOCIAL ASSESSMENT AND PLAN    Emotional:  Depression assessment:  PHQ-9 = 10-14 = Moderate Depression            Anxiety measure:  PAULINA-7 = 0-4  = Not anxious  Self-reported stress level: 5   Social support: Good   Goals:  PHQ-9 - reduced severity by one level, continue medical therapy, improved positive thoughts of well being and increased energy  Education: signs/sxs of depression, benefits of positive support system, coping mechanisms and states he follows up regularly with primary MD in regards to depression  Progressing: In Progress  Plan: Practice relaxation techniques and continue to follow up with MD and take medication as prescribed  Readiness to change: Action:  (Changing behavior)      OTHER CORE COMPONENTS     Tobacco:   Social History     Tobacco Use   Smoking Status Former Smoker    Packs/day: 1 00    Years: 60 00    Pack years: 60 00    Last attempt to quit: 2017    Years since quittin 5   Smokeless Tobacco Never Used   Tobacco Comment    quit in 2017       Tobacco Use Intervention: Referral to tobacco expert:   N/A    Blood pressure:    Restin//68   Exercise: 140//76    Goals: consistent BP < 130/80  Education:  understanding HTN and CAD and low sodium diet and HTN, breathing retraining, how to avoid infections, hand hygiene  Progressing: In Progress  Plan: Class: Understanding Heart Disease and Class: Common Heart Medications  Readiness to change: Action:  (Changing behavior)

## 2020-03-12 ENCOUNTER — CLINICAL SUPPORT (OUTPATIENT)
Dept: PULMONOLOGY | Facility: CLINIC | Age: 75
End: 2020-03-12
Payer: MEDICARE

## 2020-03-12 DIAGNOSIS — R60.0 BILATERAL LEG EDEMA: ICD-10-CM

## 2020-03-12 DIAGNOSIS — J96.11 CHRONIC RESPIRATORY FAILURE WITH HYPOXIA (HCC): ICD-10-CM

## 2020-03-12 DIAGNOSIS — J44.1 COPD EXACERBATION (HCC): ICD-10-CM

## 2020-03-12 PROCEDURE — G0424 PULMONARY REHAB W EXER: HCPCS

## 2020-03-12 NOTE — PROGRESS NOTES
Medical Director 30 day Pulmonary Rehabilitation Review    I certify that I have met with Miryam Sapp face-to face to provide a 30 day progress review of his/her pulmonary rehabilitation program at [address] [place] [address]  I have reviewed the most recent individualized treatment plan (ITP), outcomes assessment, and provided opportunity for discussion with the patient      Continue with current treatment plan yes    Please provide the following modifications to the current treatment plan:  Increase time and resistance as tolerated      LEANDER Clifford

## 2020-03-13 ENCOUNTER — OFFICE VISIT (OUTPATIENT)
Dept: PULMONOLOGY | Facility: MEDICAL CENTER | Age: 75
End: 2020-03-13
Payer: MEDICARE

## 2020-03-13 VITALS
WEIGHT: 194 LBS | DIASTOLIC BLOOD PRESSURE: 62 MMHG | SYSTOLIC BLOOD PRESSURE: 138 MMHG | OXYGEN SATURATION: 99 % | BODY MASS INDEX: 22.91 KG/M2 | HEIGHT: 77 IN | HEART RATE: 85 BPM | TEMPERATURE: 97.1 F | RESPIRATION RATE: 12 BRPM

## 2020-03-13 DIAGNOSIS — R91.1 LUNG NODULE: Primary | ICD-10-CM

## 2020-03-13 DIAGNOSIS — R91.8 LUNG NODULES: ICD-10-CM

## 2020-03-13 DIAGNOSIS — J43.2 CENTRILOBULAR EMPHYSEMA (HCC): ICD-10-CM

## 2020-03-13 DIAGNOSIS — J96.11 CHRONIC RESPIRATORY FAILURE WITH HYPOXIA (HCC): ICD-10-CM

## 2020-03-13 DIAGNOSIS — E88.01 AAT (ALPHA-1-ANTITRYPSIN) DEFICIENCY (HCC): ICD-10-CM

## 2020-03-13 PROCEDURE — 1160F RVW MEDS BY RX/DR IN RCRD: CPT | Performed by: INTERNAL MEDICINE

## 2020-03-13 PROCEDURE — 3079F DIAST BP 80-89 MM HG: CPT | Performed by: INTERNAL MEDICINE

## 2020-03-13 PROCEDURE — 3077F SYST BP >= 140 MM HG: CPT | Performed by: INTERNAL MEDICINE

## 2020-03-13 PROCEDURE — 1036F TOBACCO NON-USER: CPT | Performed by: INTERNAL MEDICINE

## 2020-03-13 PROCEDURE — 99214 OFFICE O/P EST MOD 30 MIN: CPT | Performed by: INTERNAL MEDICINE

## 2020-03-13 PROCEDURE — 4040F PNEUMOC VAC/ADMIN/RCVD: CPT | Performed by: INTERNAL MEDICINE

## 2020-03-13 PROCEDURE — 1111F DSCHRG MED/CURRENT MED MERGE: CPT | Performed by: INTERNAL MEDICINE

## 2020-03-13 RX ORDER — FAMOTIDINE 20 MG/1
TABLET, FILM COATED ORAL
COMMUNITY
Start: 2020-03-12 | End: 2020-08-04 | Stop reason: SDUPTHER

## 2020-03-13 NOTE — PATIENT INSTRUCTIONS
Go for CT scan of chest the last week of April and yet but appointment 1st week of May    [facility] - Needs humdifier

## 2020-03-16 DIAGNOSIS — J96.11 CHRONIC RESPIRATORY FAILURE WITH HYPOXIA (HCC): ICD-10-CM

## 2020-03-16 NOTE — TELEPHONE ENCOUNTER
Received prescription refill request from [facility] for Buspirone HCI 10mg tab  Please send script to patients pharmacy  Thank you!

## 2020-03-16 NOTE — PROGRESS NOTES
Pulmonary Rehabilitation Plan of Care   Medical hold      Today's date: 3/16/2020   Visits: 8  Patient name: Burton [place]      : 1945  Age: 67 y o  MRN: [id number]  Referring Physician: Pete Costa PA-C  Pulmonologist: Hany Macedo  Provider: Harry Lyle  Clinician: Twan Mantilla RN    Dx:   Encounter Diagnosis   Name Primary?  COPD exacerbation [facility])      Date of onset: 2019      SUMMARY OF PROGRESS:    Mr Birdie Piedra requested to be placed on a medical hold due to [employer] [facility] [employer] [facility] concerns  He will call pulmonary rehabilitation department when he is able to return  Mr Birdie Piedra completed 8 sessions of the pulmonary rehabilitation program  He completes 54 minutes of cardiovascular exercise with a light weight strength training component  His exercise MET level increased from 2 0 to 2 4 MET's  RPE 3-6 RPD 2-6  Completed 680 Feet of 6 minute walk test  Resting oxygenation rate was 96-99% on 4L/min of oxygen via nasal canula and during peak exercise 93-99%  He occasionally complains of a light headed feeling  Short of breath and fatigue with minimal exertion  He noticed an increase in endurance [facility]  He has had face to face visit with pulmonologist  Will continue to monitor          Medication compliance: Yes   Comments: states he is taking his medication as prescribed  Fall Risk: High   Comments: leg fatigue, short of breath and fatigue with minimal exertion    EKG changes: NSR      EXERCISE ASSESSMENT and PLAN    Current Exercise Program in Rehab:       Frequency: 2 days/week        Minutes: 53-54         METS: 2-2 4            HR:    RPE: 3-6         Modalities: Treadmill, UBE, NuStep and Recumbent bike      Exercise Progression 30 Day Goals :    Frequency: 2 days/week   Minutes: 31-45   METS: 2 0-3 0   HR:    RPE: 4-6   Modalities: Treadmill, UBE, NuStep and Recumbent bike    Strength trainin-3 days / week  12-15 repetitions  1-2 sets per modality Modalities: Lateral Raise, Arm Curl, Upright Rows, Front Raises and Shoulder Shrugs    Progressing: In Progress    Home Exercise: increase endurance to return to playing billiards, shuffle board and return to shopping for clothes    Goals:  increase endurance to return to playing billiards, shuffle board and return to shopping for clothes  Education: Benefit of exercise for CAD risk factors, home exercise guidelines, signs and sxs and RPE scale   Plan:increase endurance to continue to walk at home  Readiness to change: Preparation:  (Getting ready to change)       NUTRITION ASSESSMENT AND PLAN    Weight control:    Starting weight: 192 8   Current weight:     Waist circumference:    Startin 5   Current:    Diabetes: N/A  Lipid management: Discussed diet and lipid management and Last lipid profile 2019  Chol 167  TRG 66  HDL 40    Goals:no change at this time  Education: heart healthy diet  Progressing:No  Plan: Mr Tiffany Douglas refuse to make dietary changes at this time  Readiness to change: Pre-Contemplation:   (Not yet acknowledging that there is a problem behavior that needs to change)      PSYCHOSOCIAL ASSESSMENT AND PLAN    Emotional:  Depression assessment:  PHQ-9 = 10-14 = Moderate Depression            Anxiety measure:  PAULINA-7 = 0-4  = Not anxious  Self-reported stress level: 5   Social support: Good   Goals:  PHQ-9 - reduced severity by one level, continue medical therapy, improved positive thoughts of well being and increased energy  Education: signs/sxs of depression, benefits of positive support system, coping mechanisms and states he follows up regularly with primary MD in regards to depression  Progressing: In Progress  Plan: Practice relaxation techniques and continue to follow up with MD and take medication as prescribed  Readiness to change: Action:  (Changing behavior)      OTHER CORE COMPONENTS     Tobacco:   Social History     Tobacco Use   Smoking Status Former Smoker    Packs/day: 1 00    Years: 60 00    Pack years: 60 00    Last attempt to quit: 2017    Years since quittin 5   Smokeless Tobacco Never Used   Tobacco Comment    quit in 2017       Tobacco Use Intervention: Referral to tobacco expert:   N/A    Blood pressure:    Restin//68   Exercise: 140//76    Goals: consistent BP < 130/80  Education:  understanding HTN and CAD and low sodium diet and HTN, breathing retraining, how to avoid infections, hand hygiene  Progressing: In Progress  Plan: Class: Understanding Heart Disease and Class: Common Heart Medications  Readiness to change: Action:  (Changing behavior)

## 2020-03-17 ENCOUNTER — APPOINTMENT (OUTPATIENT)
Dept: PULMONOLOGY | Facility: CLINIC | Age: 75
End: 2020-03-17
Payer: MEDICARE

## 2020-03-17 RX ORDER — BUSPIRONE HYDROCHLORIDE 10 MG/1
TABLET ORAL
Qty: 90 TABLET | Refills: 0 | Status: SHIPPED | OUTPATIENT
Start: 2020-03-17 | End: 2020-09-30 | Stop reason: SDUPTHER

## 2020-03-18 ENCOUNTER — APPOINTMENT (EMERGENCY)
Dept: RADIOLOGY | Facility: HOSPITAL | Age: 75
End: 2020-03-18
Payer: MEDICARE

## 2020-03-18 ENCOUNTER — TELEPHONE (OUTPATIENT)
Dept: FAMILY MEDICINE CLINIC | Facility: CLINIC | Age: 75
End: 2020-03-18

## 2020-03-18 ENCOUNTER — HOSPITAL ENCOUNTER (EMERGENCY)
Facility: HOSPITAL | Age: 75
Discharge: HOME | End: 2020-03-18
Attending: EMERGENCY MEDICINE | Admitting: EMERGENCY MEDICINE
Payer: MEDICARE

## 2020-03-18 VITALS
OXYGEN SATURATION: 98 % | SYSTOLIC BLOOD PRESSURE: 162 MMHG | TEMPERATURE: 98.8 F | RESPIRATION RATE: 16 BRPM | HEART RATE: 72 BPM | DIASTOLIC BLOOD PRESSURE: 80 MMHG

## 2020-03-18 DIAGNOSIS — R11.2 NAUSEA AND VOMITING: Primary | ICD-10-CM

## 2020-03-18 DIAGNOSIS — Q61.02 MULTIPLE RENAL CYSTS: ICD-10-CM

## 2020-03-18 DIAGNOSIS — K57.90 DIVERTICULOSIS: ICD-10-CM

## 2020-03-18 DIAGNOSIS — M87.051 AVASCULAR NECROSIS OF BONE OF RIGHT HIP (HCC): ICD-10-CM

## 2020-03-18 LAB
ALBUMIN SERPL BCP-MCNC: 3.6 G/DL (ref 3.5–5)
ALP SERPL-CCNC: 61 U/L (ref 46–116)
ALT SERPL W P-5'-P-CCNC: 37 U/L (ref 12–78)
ANION GAP SERPL CALCULATED.3IONS-SCNC: 10 MMOL/L (ref 4–13)
AST SERPL W P-5'-P-CCNC: 24 U/L (ref 5–45)
BASOPHILS # BLD AUTO: 0.04 THOUSANDS/ΜL (ref 0–0.1)
BASOPHILS NFR BLD AUTO: 1 % (ref 0–1)
BILIRUB SERPL-MCNC: 1 MG/DL (ref 0.2–1)
BILIRUB UR QL STRIP: NEGATIVE
BUN SERPL-MCNC: 12 MG/DL (ref 5–25)
CALCIUM SERPL-MCNC: 8.5 MG/DL (ref 8.3–10.1)
CHLORIDE SERPL-SCNC: 102 MMOL/L (ref 100–108)
CLARITY UR: CLEAR
CO2 SERPL-SCNC: 25 MMOL/L (ref 21–32)
COLOR UR: COLORLESS
CREAT SERPL-MCNC: 1.06 MG/DL (ref 0.6–1.3)
EOSINOPHIL # BLD AUTO: 0.2 THOUSAND/ΜL (ref 0–0.61)
EOSINOPHIL NFR BLD AUTO: 3 % (ref 0–6)
ERYTHROCYTE [DISTWIDTH] IN BLOOD BY AUTOMATED COUNT: 12.4 % (ref 11.6–15.1)
GFR SERPL CREATININE-BSD FRML MDRD: 68 ML/MIN/1.73SQ M
GLUCOSE SERPL-MCNC: 122 MG/DL (ref 65–140)
GLUCOSE UR STRIP-MCNC: NEGATIVE MG/DL
HCT VFR BLD AUTO: 36.7 % (ref 36.5–49.3)
HGB BLD-MCNC: 13.1 G/DL (ref 12–17)
HGB UR QL STRIP.AUTO: NEGATIVE
IMM GRANULOCYTES # BLD AUTO: 0.03 THOUSAND/UL (ref 0–0.2)
IMM GRANULOCYTES NFR BLD AUTO: 1 % (ref 0–2)
KETONES UR STRIP-MCNC: NEGATIVE MG/DL
LEUKOCYTE ESTERASE UR QL STRIP: NEGATIVE
LIPASE SERPL-CCNC: 104 U/L (ref 73–393)
LYMPHOCYTES # BLD AUTO: 1.35 THOUSANDS/ΜL (ref 0.6–4.47)
LYMPHOCYTES NFR BLD AUTO: 21 % (ref 14–44)
MCH RBC QN AUTO: 33.8 PG (ref 26.8–34.3)
MCHC RBC AUTO-ENTMCNC: 35.7 G/DL (ref 31.4–37.4)
MCV RBC AUTO: 95 FL (ref 82–98)
MONOCYTES # BLD AUTO: 0.55 THOUSAND/ΜL (ref 0.17–1.22)
MONOCYTES NFR BLD AUTO: 8 % (ref 4–12)
NEUTROPHILS # BLD AUTO: 4.37 THOUSANDS/ΜL (ref 1.85–7.62)
NEUTS SEG NFR BLD AUTO: 66 % (ref 43–75)
NITRITE UR QL STRIP: NEGATIVE
NRBC BLD AUTO-RTO: 0 /100 WBCS
PH UR STRIP.AUTO: 7 [PH]
PLATELET # BLD AUTO: 169 THOUSANDS/UL (ref 149–390)
PMV BLD AUTO: 8.4 FL (ref 8.9–12.7)
POTASSIUM SERPL-SCNC: 3.5 MMOL/L (ref 3.5–5.3)
PROT SERPL-MCNC: 6.7 G/DL (ref 6.4–8.2)
PROT UR STRIP-MCNC: NEGATIVE MG/DL
RBC # BLD AUTO: 3.88 MILLION/UL (ref 3.88–5.62)
SODIUM SERPL-SCNC: 137 MMOL/L (ref 136–145)
SP GR UR STRIP.AUTO: <=1.005 (ref 1–1.03)
UROBILINOGEN UR QL STRIP.AUTO: 0.2 E.U./DL
WBC # BLD AUTO: 6.54 THOUSAND/UL (ref 4.31–10.16)

## 2020-03-18 PROCEDURE — 96361 HYDRATE IV INFUSION ADD-ON: CPT

## 2020-03-18 PROCEDURE — 80053 COMPREHEN METABOLIC PANEL: CPT | Performed by: EMERGENCY MEDICINE

## 2020-03-18 PROCEDURE — 85025 COMPLETE CBC W/AUTO DIFF WBC: CPT | Performed by: EMERGENCY MEDICINE

## 2020-03-18 PROCEDURE — 71045 X-RAY EXAM CHEST 1 VIEW: CPT

## 2020-03-18 PROCEDURE — 96375 TX/PRO/DX INJ NEW DRUG ADDON: CPT

## 2020-03-18 PROCEDURE — 83690 ASSAY OF LIPASE: CPT | Performed by: EMERGENCY MEDICINE

## 2020-03-18 PROCEDURE — 36415 COLL VENOUS BLD VENIPUNCTURE: CPT | Performed by: EMERGENCY MEDICINE

## 2020-03-18 PROCEDURE — 81003 URINALYSIS AUTO W/O SCOPE: CPT | Performed by: EMERGENCY MEDICINE

## 2020-03-18 PROCEDURE — 96374 THER/PROPH/DIAG INJ IV PUSH: CPT

## 2020-03-18 PROCEDURE — 74177 CT ABD & PELVIS W/CONTRAST: CPT

## 2020-03-18 PROCEDURE — 99284 EMERGENCY DEPT VISIT MOD MDM: CPT | Performed by: EMERGENCY MEDICINE

## 2020-03-18 PROCEDURE — 99284 EMERGENCY DEPT VISIT MOD MDM: CPT

## 2020-03-18 PROCEDURE — 96376 TX/PRO/DX INJ SAME DRUG ADON: CPT

## 2020-03-18 RX ORDER — ONDANSETRON 2 MG/ML
4 INJECTION INTRAMUSCULAR; INTRAVENOUS ONCE
Status: COMPLETED | OUTPATIENT
Start: 2020-03-18 | End: 2020-03-18

## 2020-03-18 RX ORDER — BUSPIRONE HYDROCHLORIDE 10 MG/1
10 TABLET ORAL 3 TIMES DAILY
Qty: 90 TABLET | Refills: 0 | Status: SHIPPED | OUTPATIENT
Start: 2020-03-18 | End: 2020-05-05

## 2020-03-18 RX ORDER — ONDANSETRON 4 MG/1
4 TABLET, ORALLY DISINTEGRATING ORAL EVERY 6 HOURS PRN
Qty: 10 TABLET | Refills: 0 | Status: SHIPPED | OUTPATIENT
Start: 2020-03-18 | End: 2020-08-19

## 2020-03-18 RX ADMIN — ONDANSETRON 4 MG: 2 INJECTION INTRAMUSCULAR; INTRAVENOUS at 11:04

## 2020-03-18 RX ADMIN — IOHEXOL 100 ML: 350 INJECTION, SOLUTION INTRAVENOUS at 12:11

## 2020-03-18 RX ADMIN — SODIUM CHLORIDE 1000 ML: 0.9 INJECTION, SOLUTION INTRAVENOUS at 11:04

## 2020-03-18 RX ADMIN — FAMOTIDINE 20 MG: 10 INJECTION, SOLUTION INTRAVENOUS at 11:05

## 2020-03-18 RX ADMIN — ONDANSETRON 4 MG: 2 INJECTION INTRAMUSCULAR; INTRAVENOUS at 14:08

## 2020-03-18 NOTE — ED NOTES
Patient transported to [facility], [address] [place] [address]  03/18/20 [id number]

## 2020-03-18 NOTE — TELEPHONE ENCOUNTER
Called patient he has vomited several times over the past four days drinking very little water and feeling weak, per Dr Rigoberto Coe patient should go to ER , patient agrees Dr Neris Navarro and ER notified

## 2020-03-18 NOTE — ED PROVIDER NOTES
History  Chief Complaint   Patient presents with    Vomiting     patient c/o vomiting, unable to keep anything down x 4 days  denies cough, fever, or difficulty breathing  Patient in ER with complaint of vomiting for 4 days  He has a history of COPD and is chronically dependent on 3 L of nasal cannula daily  He denies abdominal pain  He only states that he feels uncomfortable  Last episode of emesis was yesterday night, the patient states that he still feels nauseous  He denies any cough fevers or chills  History provided by:  Patient   used: No        Prior to Admission Medications   Prescriptions Last Dose Informant Patient Reported? Taking? OXYGEN-HELIUM IN  Self Yes No   Sig: Inhale 2L at rest- 3L with activity   VENTOLIN  (90 Base) MCG/ACT inhaler  Self Yes No   ZEMAIRA infusion  Self Yes No   amLODIPine (NORVASC) 10 mg tablet  Self No No   Sig: TAKE ONE TABLET BY MOUTH ONE TIME DAILY    budesonide (PULMICORT) 0 5 mg/2 mL nebulizer solution  Self No No   Sig: Take 1 vial (0 5 mg total) by nebulization 2 (two) times a day for 5 days Rinse mouth after use     busPIRone (BUSPAR) 10 mg tablet   No No   Sig: TAKE ONE TABLET BY MOUTH THREE TIMES DAILY    busPIRone (BUSPAR) 10 mg tablet   No No   Sig: Take 1 tablet (10 mg total) by mouth 3 (three) times a day   famotidine (PEPCID) 20 mg tablet  Self Yes No   finasteride (PROSCAR) 5 mg tablet  Self Yes No   Sig: Take 5 mg by mouth every morning     formoterol (PERFOROMIST) 20 MCG/2ML nebulizer solution  Self Yes No   Sig: Take 20 mcg by nebulization 2 (two) times a day   gabapentin (NEURONTIN) 300 mg capsule  Self No No   Sig: Take 1 capsule (300 mg total) by mouth 3 (three) times a day   ipratropium-albuterol (DUO-NEB) 0 5-2 5 mg/3 mL nebulizer solution  Self No No   Sig: Take 1 vial (3 mL total) by nebulization 4 (four) times a day   methocarbamol (ROBAXIN) 750 mg tablet  Self No No   Sig: Take 1 tablet (750 mg total) by mouth 3 (three) times a day as needed for muscle spasms   metoclopramide (REGLAN) 10 mg tablet  Self No No   Sig: Take 1 tablet (10 mg total) by mouth 2 (two) times a day before meals   ondansetron (ZOFRAN) 4 mg tablet  Self No No   Sig: Take 1 tablet (4 mg total) by mouth every 8 (eight) hours as needed for nausea or vomiting   pantoprazole (PROTONIX) 40 mg tablet  Self No No   Sig: Take 1 tablet (40 mg total) by mouth 2 (two) times a day   tamsulosin (FLOMAX) 0 4 mg  Self No No   Sig: TAKE ONE CAPSULE BY MOUTH ONE TIME DAILY    triamterene-hydrochlorothiazide (DYAZIDE) 37 5-25 mg per capsule  Self No No   Sig: Take 1 capsule by mouth daily as needed (leg swelling)   zolpidem (AMBIEN) 5 mg tablet  Self No No   Sig: Take 1 tablet (5 mg total) by mouth daily at bedtime as needed for sleep      Facility-Administered Medications: None       Past Medical History:   Diagnosis Date    Anesthesia complication     Difficult to wake up    Arthritis     BPH (benign prostatic hyperplasia)     urinary frequency    Cancer (HCC)     basal cell neck, face    COPD exacerbation (HCC) 12/29/2019    Full dentures     Hiatal hernia     History of methicillin resistant staphylococcus aureus (MRSA)     10/11/2018 MRSA (nares) positive    Irritable bowel syndrome     Kidney stone     at least 7 episodes    Liver disease     Alpha 1- enzyme deficiency - diagnosed 2002  has been on weekly replacement therapy since then    Pulmonary emphysema ([facility] [address] )     1/25/15  FEV1 - 2 45 liters or 59% of predicted    RSV infection 12/2017    Wears glasses     for driving only       Past Surgical History:   Procedure Laterality Date    BACK SURGERY  2008    discectomy    COLONOSCOPY      COLONOSCOPY N/A 3/10/2017    Procedure: Diego Valenzuela;  Surgeon: Wilfred Calvert MD;  Location: [facility] GI LAB;   Service:    River Torres      removal of kidney stones    ESOPHAGOGASTRODUODENOSCOPY N/A 3/10/2017    Procedure: ESOPHAGOGASTRODUODENOSCOPY (EGD); Surgeon: Chichi Lutz MD;  Location[facility]; Service:     LITHOTRIPSY      RI ESOPHAGOGASTRODUODENOSCOPY TRANSORAL DIAGNOSTIC N/A 2018    Procedure: ESOPHAGOGASTRODUODENOSCOPY (EGD); Surgeon: Chichi Lutz MD;  Location: [facility]; Service: Gastroenterology    TONSILLECTOMY      VEIN LIGATION AND STRIPPING Bilateral     18's       Family History   Problem Relation Age of Onset    Emphysema Mother         never smoked    Emphysema Father     Cancer Brother         colon    Colon cancer Brother     Ulcerative colitis Family     Liver disease Family      I have reviewed and agree with the history as documented  E-Cigarette/Vaping     E-Cigarette/Vaping Substances     Social History     Tobacco Use    Smoking status: Former Smoker     Packs/day: 1 00     Years: 60 00     Pack years: 60 00     Last attempt to quit: 2017     Years since quittin 5    Smokeless tobacco: Never Used    Tobacco comment: quit in 2017   Substance Use Topics    Alcohol use: Not Currently     Frequency: Never     Comment: stopped in     Drug use: No       Review of Systems   Constitutional: Negative for chills and fever  Respiratory: Positive for shortness of breath  Negative for cough and wheezing  Cardiovascular: Negative for chest pain and palpitations  Gastrointestinal: Positive for abdominal pain and vomiting  Negative for constipation, diarrhea and nausea  Genitourinary: Negative for dysuria, flank pain, hematuria and urgency  Musculoskeletal: Negative for back pain  Skin: Negative for color change and rash  All other systems reviewed and are negative  Physical Exam  Physical Exam   Constitutional: He is oriented to person, place, and time  He appears well-developed and well-nourished  HENT:   Head: Normocephalic and atraumatic  Eyes: Pupils are equal, round, and reactive to light   EOM are normal    Cardiovascular: Normal rate, regular rhythm and normal heart sounds  Pulmonary/Chest: Effort normal and breath sounds normal    Abdominal: Soft  Bowel sounds are normal  He exhibits no distension and no mass  There is no tenderness  There is no rebound and no guarding  Neurological: He is alert and oriented to person, place, and time  Skin: Skin is warm and dry  Psychiatric: He has a normal mood and affect  His behavior is normal  Judgment and thought content normal    Nursing note and vitals reviewed        Vital Signs  ED Triage Vitals [03/18/20 1035]   Temperature Pulse Respirations Blood Pressure SpO2   98 8 °F (37 1 °C) 82 18 170/81 97 %      Temp Source Heart Rate Source Patient Position - Orthostatic VS BP Location FiO2 (%)   Tympanic Monitor Sitting Right arm --      Pain Score       --           Vitals:    03/18/20 1300 03/18/20 1315 03/18/20 1330 03/18/20 1406   BP: 168/79  168/87 162/80   Pulse: 70 74  72   Patient Position - Orthostatic VS:    Lying         Visual Acuity      ED Medications  Medications   ondansetron (ZOFRAN) injection 4 mg (4 mg Intravenous Given 3/18/20 1104)   sodium chloride 0 9 % bolus 1,000 mL (0 mL Intravenous Stopped 3/18/20 1211)   famotidine (PEPCID) injection 20 mg (20 mg Intravenous Given 3/18/20 1105)   iohexol (OMNIPAQUE) 350 MG/ML injection (MULTI-DOSE) 100 mL (100 mL Intravenous Given 3/18/20 1211)   ondansetron (ZOFRAN) injection 4 mg (4 mg Intravenous Given 3/18/20 1408)       Diagnostic Studies  Results Reviewed     Procedure Component Value Units Date/Time    UA (URINE) with reflex to Scope [[id number]] Collected:  03/18/20 1243    Lab Status:  Final result Specimen:  Urine, Clean Catch Updated:  03/18/20 1251     Color, UA Colorless     Clarity, UA Clear     Specific Gravity, UA <=1 005     pH, UA 7 0     Leukocytes, UA Negative     Nitrite, UA Negative     Protein, UA Negative mg/dl      Glucose, UA Negative mg/dl      Ketones, UA Negative mg/dl      Urobilinogen, UA 0 2 E U /dl      Bilirubin, UA Negative Blood, UA Negative    Lipase [[id number]]  (Normal) Collected:  03/18/20 1101    Lab Status:  Final result Specimen:  Blood from Arm, Right Updated:  03/18/20 1123     Lipase 104 u/L     Comprehensive metabolic panel [[id number]] Collected:  03/18/20 1101    Lab Status:  Final result Specimen:  Blood from Arm, Right Updated:  03/18/20 1123     Sodium 137 mmol/L      Potassium 3 5 mmol/L      Chloride 102 mmol/L      CO2 25 mmol/L      ANION GAP 10 mmol/L      BUN 12 mg/dL      Creatinine 1 06 mg/dL      Glucose 122 mg/dL      Calcium 8 5 mg/dL      AST 24 U/L      ALT 37 U/L      Alkaline Phosphatase 61 U/L      Total Protein 6 7 g/dL      Albumin 3 6 g/dL      Total Bilirubin 1 00 mg/dL      eGFR 68 ml/min/1 73sq m     Narrative:       [place] guidelines for Chronic Kidney Disease (CKD):     Stage 1 with normal or high GFR (GFR > 90 mL/min/1 73 square meters)    Stage 2 Mild CKD (GFR = 60-89 mL/min/1 73 square meters)    Stage 3A Moderate CKD (GFR = 45-59 mL/min/1 73 square meters)    Stage 3B Moderate CKD (GFR = 30-44 mL/min/1 73 square meters)    Stage 4 Severe CKD (GFR = 15-29 mL/min/1 73 square meters)    Stage 5 End Stage CKD (GFR <15 mL/min/1 73 square meters)  Note: GFR calculation is accurate only with a steady state creatinine    CBC and differential [[id number]]  (Abnormal) Collected:  03/18/20 1101    Lab Status:  Final result Specimen:  Blood from Arm, Right Updated:  03/18/20 1107     WBC 6 54 Thousand/uL      RBC 3 88 Million/uL      Hemoglobin 13 1 g/dL      Hematocrit 36 7 %      MCV 95 fL      MCH 33 8 pg      MCHC 35 7 g/dL      RDW 12 4 %      MPV 8 4 fL      Platelets 636 Thousands/uL      nRBC 0 /100 WBCs      Neutrophils Relative 66 %      Immat GRANS % 1 %      Lymphocytes Relative 21 %      Monocytes Relative 8 %      Eosinophils Relative 3 %      Basophils Relative 1 %      Neutrophils Absolute 4 37 Thousands/µL      Immature Grans Absolute 0 03 Thousand/uL Lymphocytes Absolute 1 35 Thousands/µL      Monocytes Absolute 0 55 Thousand/µL      Eosinophils Absolute 0 20 Thousand/µL      Basophils Absolute 0 04 Thousands/µL                  CT abdomen pelvis with contrast   Final Result by Rl Bishop MD (03/18 1311)      No CT findings of acute abdominopelvic abnormality  Multiple chronic findings as described  Workstation performed: [id number]         XR chest 1 view portable   Final Result by Tomi Ceballos MD (03/18 1136)      No acute cardiopulmonary disease  Workstation performed: [id number]                    Procedures  Procedures         ED Course                                 MDM  Number of Diagnoses or Management Options  Avascular necrosis of bone of right hip [facility]):   Diverticulosis:   Multiple renal cysts:   Nausea and vomiting:   Diagnosis management comments: Pt tolerating water in the ER  He c/o nausea, but is not vomiting   Will give a dose of zofran and d/c to home       Amount and/or Complexity of Data Reviewed  Clinical lab tests: ordered and reviewed  Tests in the radiology section of CPT®: ordered and reviewed    Risk of Complications, Morbidity, and/or Mortality  Presenting problems: moderate  Diagnostic procedures: moderate  Management options: moderate    Patient Progress  Patient progress: improved        Disposition  Final diagnoses:   Nausea and vomiting   Avascular necrosis of bone of right hip ([facility] [address] )   Diverticulosis   Multiple renal cysts     Time reflects when diagnosis was documented in both MDM as applicable and the Disposition within this note     Time User Action Codes Description Comment    3/18/2020  1:49 PM Sherian Mock O Add [R11 2] Nausea and vomiting     3/18/2020  1:52 PM Sherian Mock Add [M87 051] Avascular necrosis of bone of right hip ([facility] [address] )     3/18/2020  1:52 PM Sherian Mock Add [K57 90] Diverticulosis     3/18/2020  1:53 PM Sherian Mock Add [Q61 02] Multiple renal cysts       ED Disposition     ED Disposition Condition Date/Time Comment    Discharge Stable Wed Mar 18, 2020  1:49 PM Lou Carlitos discharge to home/self care              Follow-up Information     Follow up With Specialties Details Why Contact Info Additional Information    Agustin Aggarwal MD Family Medicine Schedule an appointment as soon as possible for a visit in 1 day for follow up [address] [facility] [address] Orthopedic Surgery Schedule an appointment as soon as possible for a visit in 2 days for follow up concerning avascular necrosis [address], [facility] [address] [facility] [address], [address] [facility] [address], [address], [place], [place], [address] [phone]          Discharge Medication List as of 3/18/2020  1:54 PM      START taking these medications    Details   ondansetron (ZOFRAN-ODT) 4 mg disintegrating tablet Take 1 tablet (4 mg total) by mouth every 6 (six) hours as needed for nausea or vomiting, Starting Wed 3/18/2020, Normal         CONTINUE these medications which have NOT CHANGED    Details   amLODIPine (NORVASC) 10 mg tablet TAKE ONE TABLET BY MOUTH ONE TIME DAILY , Normal      budesonide (PULMICORT) 0 5 mg/2 mL nebulizer solution Take 1 vial (0 5 mg total) by nebulization 2 (two) times a day for 5 days Rinse mouth after use , Starting Fri 11/15/2019, Until Fri 3/13/2020, Print      !! busPIRone (BUSPAR) 10 mg tablet TAKE ONE TABLET BY MOUTH THREE TIMES DAILY , Normal      !! busPIRone (BUSPAR) 10 mg tablet Take 1 tablet (10 mg total) by mouth 3 (three) times a day, Starting Wed 3/18/2020, Normal      famotidine (PEPCID) 20 mg tablet Starting Thu 3/12/2020, Historical Med      finasteride (PROSCAR) 5 mg tablet Take 5 mg by mouth every morning  , Starting Wed 6/20/2018, Historical Med formoterol (PERFOROMIST) 20 MCG/2ML nebulizer solution Take 20 mcg by nebulization 2 (two) times a day, Historical Med      gabapentin (NEURONTIN) 300 mg capsule Take 1 capsule (300 mg total) by mouth 3 (three) times a day, Starting Tue 9/3/2019, Normal      ipratropium-albuterol (DUO-NEB) 0 5-2 5 mg/3 mL nebulizer solution Take 1 vial (3 mL total) by nebulization 4 (four) times a day, Starting Fri 11/15/2019, Normal      methocarbamol (ROBAXIN) 750 mg tablet Take 1 tablet (750 mg total) by mouth 3 (three) times a day as needed for muscle spasms, Starting Wed 2/12/2020, Normal      metoclopramide (REGLAN) 10 mg tablet Take 1 tablet (10 mg total) by mouth 2 (two) times a day before meals, Starting Mon 1/20/2020, Normal      ondansetron (ZOFRAN) 4 mg tablet Take 1 tablet (4 mg total) by mouth every 8 (eight) hours as needed for nausea or vomiting, Starting Thu 1/9/2020, Normal      OXYGEN-HELIUM IN Inhale 2L at rest- 3L with activity, Historical Med      pantoprazole (PROTONIX) 40 mg tablet Take 1 tablet (40 mg total) by mouth 2 (two) times a day, Starting Wed 2/12/2020, Normal      tamsulosin (FLOMAX) 0 4 mg TAKE ONE CAPSULE BY MOUTH ONE TIME DAILY , Normal      triamterene-hydrochlorothiazide (DYAZIDE) 37 5-25 mg per capsule Take 1 capsule by mouth daily as needed (leg swelling), Starting Mon 1/13/2020, Normal      VENTOLIN  (90 Base) MCG/ACT inhaler Starting Fri 12/20/2019, Historical Med      ZEMAIRA infusion Starting Thu 12/26/2019, Historical Med      zolpidem (AMBIEN) 5 mg tablet Take 1 tablet (5 mg total) by mouth daily at bedtime as needed for sleep, Starting Fri 2/21/2020, Normal       !! - Potential duplicate medications found  Please discuss with provider  No discharge procedures on file      PDMP Review     None          ED Provider  Electronically Signed by           Naseem Collins DO  03/20/20 8815

## 2020-03-18 NOTE — DISCHARGE INSTRUCTIONS
Return to the ER for further concerns or worsening symptoms  Follow up with your primary care physician in 1-2 days  Take medication as prescribed

## 2020-03-18 NOTE — TELEPHONE ENCOUNTER
Do you have a fever of 100 5 and above?:no  Do you have a cough?:con  Do you have shortness of breath?:yes oxigen    Have you visited [place]?:  [facility]?:  [place]?:  [place]  [place]?:    Have you visited [place]?:  [place]?:  [place]?:    Have you visited [facility] State:?no  [place]no  [address] state?:no  [facility]?:no    Have you been exposed to someone who has been diagnosed with coronavirus?:no      oxigen patient  Very week  vomiting  [id number] cell

## 2020-03-19 ENCOUNTER — APPOINTMENT (OUTPATIENT)
Dept: PULMONOLOGY | Facility: CLINIC | Age: 75
End: 2020-03-19
Payer: MEDICARE

## 2020-03-20 ENCOUNTER — PATIENT OUTREACH (OUTPATIENT)
Dept: CASE MANAGEMENT | Facility: HOSPITAL | Age: 75
End: 2020-03-20

## 2020-03-20 ENCOUNTER — TELEPHONE (OUTPATIENT)
Dept: PULMONOLOGY | Facility: MEDICAL CENTER | Age: 75
End: 2020-03-20

## 2020-03-20 NOTE — PROGRESS NOTES
Patient was contacted to follow-up after previous outreach  Miller Kendrick has not yet received the humidifier bottle for the oxygen, as requested and stated in Dr Edgard Jennings notes on 3/13  RT sent email to [facility] to clarify and response received that the order was not seen  RT contacted Dr Christiano Young office to have a script for the O2 humidifier bottle sent to [facility] by FAX  Message left on Pulmonary office answering machine  Miller Kendrick has been attending Pulmonary Rehab, although the sessions are currently on hold due to COVID-19  Abelardo also has Life 2000 Ventilator through [facility] that he hasn't been using because he is unsure how to move the O2 tubing from the concentrator to the ventilator  RT contacted [facility] and requested that his RT, Jeanie Barros make a visit to re-instruct on proper use of the machine and write it down for him  Abelardo was also concerned about the EOB he received stating Medicare's payment is over $2000 for the ventilator  [facility] stated that this is an ongoing monthly rental and the machine does not "rent to purchase "    Patient was informed of the above information and will be expecting a call from [facility]     Will continue to follow patient  Contact information provided

## 2020-03-20 NOTE — TELEPHONE ENCOUNTER
Rylan Wilcox from respitory therapy called and asked to have a order faxed to [employer] for oxygen humidifier for concentrator  Can you please place order and I will fax to [employer]

## 2020-03-23 RX ORDER — TRIAMTERENE AND HYDROCHLOROTHIAZIDE 37.5; 25 MG/1; MG/1
CAPSULE ORAL
Qty: 20 CAPSULE | Refills: 0 | Status: SHIPPED | OUTPATIENT
Start: 2020-03-23 | End: 2020-09-16 | Stop reason: HOSPADM

## 2020-03-23 NOTE — ASSESSMENT & PLAN NOTE
He has been on alpha 1 antitrypsin replacement therapy for several years  He received this once a week by visiting nurse  He is receiving anti Proteus    And has tolerated it well

## 2020-03-23 NOTE — ASSESSMENT & PLAN NOTE
I did review recent PET CT scan was done January 24, 2020 with patient this shows no hypermetabolic activity in the 8 mm left lower lobe lung nodule  He also has a small 4 mm right upper lobe lung nodule  Do a follow-up CT scan of chest without contrast in 3 months see if there is any change in this left lower lobe lung nodule  May just be inflammatory lesion    He will have this CT scan done in 3 months or late April of this year

## 2020-03-23 NOTE — PROGRESS NOTES
Assessment/Plan        Problem List Items Addressed This Visit        Digestive    AAT (alpha-1-antitrypsin) deficiency ([facility] )     He has been on alpha 1 antitrypsin replacement therapy for several years  He received this once a week by visiting nurse  He is receiving anti Proteus  And has tolerated it well            Respiratory    Chronic respiratory failure with hypoxia ([facility] )     He is on oxygen chronic basis usually has 3-4 liters/minute  He does have to life 2000 to help him with activity  He has had for about a month or so  Relevant Orders    Oxygen Supplies    Centrilobular emphysema ([facility] )     He does have severe COPD with FEV1 of 1 78 L or 44% of predicted  Continue with neb treatments with DuoNeb 4 times a day and Pulmicort 0 5 mg twice a day    He will continue to participate in pulmonary rehab            Other    Lung nodules     I did review recent PET CT scan was done January 24, 2020 with patient this shows no hypermetabolic activity in the 8 mm left lower lobe lung nodule  He also has a small 4 mm right upper lobe lung nodule  Do a follow-up CT scan of chest without contrast in 3 months see if there is any change in this left lower lobe lung nodule  May just be inflammatory lesion  He will have this CT scan done in 3 months or late April of this year           Other Visit Diagnoses     Lung nodule    -  Primary    Relevant Orders    CT chest without contrast            CC: Follow-up  for COPD  Does get short of breath with activity      WILL     Plainville Rye presents for follow-up visit regarding his severe COPD and alpha 1 anti trypsin deficiency  He does receive weekly intravenous anti  He is therapy at home by his visiting nurse  Is been on this for several years  He also has severe COPD with FEV1 of 1 78 L or 44% of predicted  He has been using neb treatment with DuoNeb 4 times a day and Pulmicort 0 5 mg b i d  He has been participating in pulmonary rehab    He is not have any weight loss or new cough  He does get short of breath with exertional activity this has remained stable  He also has history of lung nodules and last CT of chest was done December 4, 2019  There was a 7 x 6 x 9 mm irregular left lower lobe lung nodule which appears larger than on prior CT scan March 2018  He is also a 4 mm right upper lobe lung nodule which is smaller than prior CT scan  He did have a PET CT scan done on January 24, 2020  Marcello Faustin There is no evidence of any hyper metabolic lung nodules  There is some very mild focal FDG uptake in the distal descending colon  No lesion could be detected on CT scans     On PET CT scan left lower lobe nodule was 8 mm in size  He does have the life 2000 that he uses with oxygen when he is doing exertional activity  His major limitation with this is that the oxygen tank is heavy to carry with equipment  Says this does help slightly with activity  He got this time [facility]    Past Medical History:   Diagnosis Date    Anesthesia complication     Difficult to wake up    Arthritis     BPH (benign prostatic hyperplasia)     urinary frequency    Cancer (HCC)     basal cell neck, face    COPD exacerbation ([facility] ) 12/29/2019    Full dentures     Hiatal hernia     History of methicillin resistant staphylococcus aureus (MRSA)     10/11/2018 MRSA (nares) positive    Irritable bowel syndrome     Kidney stone     at least 7 episodes    Liver disease     Alpha 1- enzyme deficiency - diagnosed 2002  has been on weekly replacement therapy since then    Pulmonary emphysema ([facility] )     1/25/15  FEV1 - 2 45 liters or 59% of predicted    RSV infection 12/2017    Wears glasses     for driving only       Past Surgical History:   Procedure Laterality Date    BACK SURGERY  2008    discectomy    COLONOSCOPY      COLONOSCOPY N/A 3/10/2017    Procedure: Melene Stands;  Surgeon: Adeline Titus MD;  [facility];   Service:     CYSTOSCOPY      removal of kidney stones    ESOPHAGOGASTRODUODENOSCOPY N/A 3/10/2017    Procedure: ESOPHAGOGASTRODUODENOSCOPY (EGD); Surgeon: Leelee Stratton MD;  Location: [facility] GI LAB; Service:     LITHOTRIPSY      TN ESOPHAGOGASTRODUODENOSCOPY TRANSORAL DIAGNOSTIC N/A 11/20/2018    Procedure: ESOPHAGOGASTRODUODENOSCOPY (EGD); Surgeon: Leelee Stratton MD;  Location: [facility] GI LAB;   Service: Gastroenterology    TONSILLECTOMY      VEIN LIGATION AND STRIPPING Bilateral     1980's         Current Outpatient Medications:     amLODIPine (NORVASC) 10 mg tablet, TAKE ONE TABLET BY MOUTH ONE TIME DAILY , Disp: 30 tablet, Rfl: 4    budesonide (PULMICORT) 0 5 mg/2 mL nebulizer solution, Take 1 vial (0 5 mg total) by nebulization 2 (two) times a day for 5 days Rinse mouth after use , Disp: 10 vial, Rfl: 0    famotidine (PEPCID) 20 mg tablet, , Disp: , Rfl:     finasteride (PROSCAR) 5 mg tablet, Take 5 mg by mouth every morning  , Disp: , Rfl:     formoterol (PERFOROMIST) 20 MCG/2ML nebulizer solution, Take 20 mcg by nebulization 2 (two) times a day, Disp: , Rfl:     gabapentin (NEURONTIN) 300 mg capsule, Take 1 capsule (300 mg total) by mouth 3 (three) times a day, Disp: 90 capsule, Rfl: 3    ipratropium-albuterol (DUO-NEB) 0 5-2 5 mg/3 mL nebulizer solution, Take 1 vial (3 mL total) by nebulization 4 (four) times a day, Disp: 120 vial, Rfl: 6    methocarbamol (ROBAXIN) 750 mg tablet, Take 1 tablet (750 mg total) by mouth 3 (three) times a day as needed for muscle spasms, Disp: 30 tablet, Rfl: 1    metoclopramide (REGLAN) 10 mg tablet, Take 1 tablet (10 mg total) by mouth 2 (two) times a day before meals, Disp: 60 tablet, Rfl: 1    ondansetron (ZOFRAN) 4 mg tablet, Take 1 tablet (4 mg total) by mouth every 8 (eight) hours as needed for nausea or vomiting, Disp: 20 tablet, Rfl: 0    OXYGEN-HELIUM IN, Inhale 2L at rest- 3L with activity, Disp: , Rfl:     pantoprazole (PROTONIX) 40 mg tablet, Take 1 tablet (40 mg total) by mouth 2 (two) times a day, Disp: 180 tablet, Rfl: 1    tamsulosin (FLOMAX) 0 4 mg, TAKE ONE CAPSULE BY MOUTH ONE TIME DAILY , Disp: 30 capsule, Rfl: 2    VENTOLIN  (90 Base) MCG/ACT inhaler, , Disp: , Rfl:     ZEMAIRA infusion, , Disp: , Rfl:     zolpidem (AMBIEN) 5 mg tablet, Take 1 tablet (5 mg total) by mouth daily at bedtime as needed for sleep, Disp: 30 tablet, Rfl: 0    busPIRone (BUSPAR) 10 mg tablet, TAKE ONE TABLET BY MOUTH THREE TIMES DAILY , Disp: 90 tablet, Rfl: 0    busPIRone (BUSPAR) 10 mg tablet, Take 1 tablet (10 mg total) by mouth 3 (three) times a day, Disp: 90 tablet, Rfl: 0    ondansetron (ZOFRAN-ODT) 4 mg disintegrating tablet, Take 1 tablet (4 mg total) by mouth every 6 (six) hours as needed for nausea or vomiting, Disp: 10 tablet, Rfl: 0    triamterene-hydrochlorothiazide (DYAZIDE) 37 5-25 mg per capsule, TAKE ONE CAPSULE BY MOUTH DAILY AS NEEDED for leg swelling, Disp: 20 capsule, Rfl: 0    Allergies   Allergen Reactions    Chantix [Varenicline]      Caused prostate infection       Social History     Tobacco Use    Smoking status: Former Smoker     Packs/day: 1 00     Years: 60 00     Pack years: 60 00     Last attempt to quit: 2017     Years since quittin 5    Smokeless tobacco: Never Used    Tobacco comment: quit in 2017   Substance Use Topics    Alcohol use: Not Currently     Frequency: Never     Comment: stopped in          Family History   Problem Relation Age of Onset    Emphysema Mother         never smoked    Emphysema Father     Cancer Brother         colon    Colon cancer Brother     Ulcerative colitis Family     Liver disease Family        Review of Systems   Constitutional: Negative for chills, fever and unexpected weight change  HENT: Negative for congestion, rhinorrhea and sore throat  Eyes: Negative for discharge and redness  Respiratory: Positive for shortness of breath  Negative for cough      Cardiovascular: Negative for chest pain, palpitations and leg swelling  Gastrointestinal: Negative for abdominal distention, abdominal pain and nausea  Endocrine: Negative for polydipsia and polyphagia  Genitourinary: Negative for dysuria  Musculoskeletal: Negative for joint swelling and myalgias  Skin: Negative for rash  Neurological: Negative for light-headedness  Psychiatric/Behavioral: Negative for decreased concentration  Vitals:    03/13/20 1039   BP: 138/62   Pulse: 85   Resp: 12   Temp: (!) 97 1 °F (36 2 °C)   SpO2: 99%           Physical Exam   Constitutional: He is oriented to person, place, and time  He appears well-developed and well-nourished  No distress  HENT:   Head: Normocephalic  Nose: Nose normal    Mouth/Throat: Oropharynx is clear and moist  No oropharyngeal exudate  Eyes: Pupils are equal, round, and reactive to light  Conjunctivae are normal    Neck: Neck supple  No JVD present  No tracheal deviation present  No thyromegaly present  Cardiovascular: Normal rate, regular rhythm and normal heart sounds  Pulmonary/Chest: Effort normal    Lung sounds are clear  No wheezes, crackles or rhonchi   Abdominal: Soft  He exhibits no distension  There is no tenderness  Musculoskeletal:   Trace edema lower extremities  No cyanosis or clubbing   Lymphadenopathy:     He has no cervical adenopathy  Neurological: He is alert and oriented to person, place, and time  Skin: Skin is warm and dry  Psychiatric: He has a normal mood and affect           Office Spirometry Results:

## 2020-03-23 NOTE — ASSESSMENT & PLAN NOTE
He is on oxygen chronic basis usually has 3-4 liters/minute  He does have to life 2000 to help him with activity  He has had for about a month or so

## 2020-03-24 ENCOUNTER — TELEPHONE (OUTPATIENT)
Dept: FAMILY MEDICINE CLINIC | Facility: CLINIC | Age: 75
End: 2020-03-24

## 2020-03-24 ENCOUNTER — PATIENT OUTREACH (OUTPATIENT)
Dept: CASE MANAGEMENT | Facility: HOSPITAL | Age: 75
End: 2020-03-24

## 2020-03-24 ENCOUNTER — APPOINTMENT (OUTPATIENT)
Dept: PULMONOLOGY | Facility: CLINIC | Age: 75
End: 2020-03-24
Payer: MEDICARE

## 2020-03-24 NOTE — TELEPHONE ENCOUNTER
Received a Plan of Care for this patient, needs your signature, there was a correction?   The form is in yuour folder in precept room

## 2020-03-26 ENCOUNTER — APPOINTMENT (OUTPATIENT)
Dept: PULMONOLOGY | Facility: CLINIC | Age: 75
End: 2020-03-26
Payer: MEDICARE

## 2020-03-30 ENCOUNTER — PATIENT OUTREACH (OUTPATIENT)
Dept: CASE MANAGEMENT | Facility: HOSPITAL | Age: 75
End: 2020-03-30

## 2020-03-30 NOTE — PROGRESS NOTES
Patient was contacted to follow-up on our last conversation and confirm that he received the humidifier bottle as well as additional training on the Life 2000 ventilator  Abelardo confirmed that he received the humidifier and ius using as directed with good results  He also got a call from [place], his Respiratory Therapist at [address] [place] [address]  They discussed the instructions for use of the ventilator by phone  She will not be making a house call at this time due to the [place] outbreak  Jewell Milton states that he is able to use the machine and understands how to assemble for night time use  He cannot wear it for longer than an hour at a time  We discussed the continued use and increasing time little by little, even during nap times during the day  No additional concerns at this time

## 2020-03-31 ENCOUNTER — APPOINTMENT (OUTPATIENT)
Dept: PULMONOLOGY | Facility: CLINIC | Age: 75
End: 2020-03-31
Payer: MEDICARE

## 2020-04-02 ENCOUNTER — APPOINTMENT (OUTPATIENT)
Dept: PULMONOLOGY | Facility: CLINIC | Age: 75
End: 2020-04-02
Payer: MEDICARE

## 2020-04-02 ENCOUNTER — TELEMEDICINE (OUTPATIENT)
Dept: PULMONOLOGY | Facility: CLINIC | Age: 75
End: 2020-04-02
Payer: MEDICARE

## 2020-04-02 DIAGNOSIS — J44.1 COPD EXACERBATION (HCC): ICD-10-CM

## 2020-04-02 PROCEDURE — G2061 QUAL NONMD EST PT 5-10M: HCPCS

## 2020-04-07 ENCOUNTER — TELEPHONE (OUTPATIENT)
Dept: FAMILY MEDICINE CLINIC | Facility: CLINIC | Age: 75
End: 2020-04-07

## 2020-04-07 ENCOUNTER — APPOINTMENT (OUTPATIENT)
Dept: PULMONOLOGY | Facility: CLINIC | Age: 75
End: 2020-04-07
Payer: MEDICARE

## 2020-04-09 ENCOUNTER — APPOINTMENT (OUTPATIENT)
Dept: PULMONOLOGY | Facility: CLINIC | Age: 75
End: 2020-04-09
Payer: MEDICARE

## 2020-04-09 ENCOUNTER — TELEMEDICINE (OUTPATIENT)
Dept: PULMONOLOGY | Facility: CLINIC | Age: 75
End: 2020-04-09
Payer: MEDICARE

## 2020-04-09 DIAGNOSIS — J44.1 COPD EXACERBATION (HCC): ICD-10-CM

## 2020-04-09 DIAGNOSIS — R35.0 BENIGN PROSTATIC HYPERPLASIA WITH URINARY FREQUENCY: ICD-10-CM

## 2020-04-09 DIAGNOSIS — N40.1 BENIGN PROSTATIC HYPERPLASIA WITH URINARY FREQUENCY: ICD-10-CM

## 2020-04-09 DIAGNOSIS — K21.00 GASTROESOPHAGEAL REFLUX DISEASE WITH ESOPHAGITIS: ICD-10-CM

## 2020-04-09 PROCEDURE — G2061 QUAL NONMD EST PT 5-10M: HCPCS

## 2020-04-10 RX ORDER — TAMSULOSIN HYDROCHLORIDE 0.4 MG/1
CAPSULE ORAL
Qty: 30 CAPSULE | Refills: 0 | Status: SHIPPED | OUTPATIENT
Start: 2020-04-10 | End: 2020-05-05

## 2020-04-10 RX ORDER — METOCLOPRAMIDE 10 MG/1
TABLET ORAL
Qty: 60 TABLET | Refills: 0 | Status: SHIPPED | OUTPATIENT
Start: 2020-04-10 | End: 2020-05-13

## 2020-04-14 ENCOUNTER — APPOINTMENT (OUTPATIENT)
Dept: PULMONOLOGY | Facility: CLINIC | Age: 75
End: 2020-04-14
Payer: MEDICARE

## 2020-04-14 DIAGNOSIS — G47.01 INSOMNIA DUE TO MEDICAL CONDITION: ICD-10-CM

## 2020-04-15 RX ORDER — ZOLPIDEM TARTRATE 5 MG/1
TABLET ORAL
Qty: 30 TABLET | Refills: 0 | Status: SHIPPED | OUTPATIENT
Start: 2020-04-15 | End: 2020-05-13

## 2020-04-16 ENCOUNTER — TELEMEDICINE (OUTPATIENT)
Dept: PULMONOLOGY | Facility: CLINIC | Age: 75
End: 2020-04-16
Payer: MEDICARE

## 2020-04-16 ENCOUNTER — APPOINTMENT (OUTPATIENT)
Dept: PULMONOLOGY | Facility: CLINIC | Age: 75
End: 2020-04-16
Payer: MEDICARE

## 2020-04-16 DIAGNOSIS — J43.2 CENTRILOBULAR EMPHYSEMA (HCC): ICD-10-CM

## 2020-04-16 DIAGNOSIS — J44.1 COPD EXACERBATION (HCC): ICD-10-CM

## 2020-04-16 PROCEDURE — G2061 QUAL NONMD EST PT 5-10M: HCPCS

## 2020-04-21 ENCOUNTER — APPOINTMENT (OUTPATIENT)
Dept: PULMONOLOGY | Facility: CLINIC | Age: 75
End: 2020-04-21
Payer: MEDICARE

## 2020-04-23 ENCOUNTER — APPOINTMENT (OUTPATIENT)
Dept: PULMONOLOGY | Facility: CLINIC | Age: 75
End: 2020-04-23
Payer: MEDICARE

## 2020-04-23 ENCOUNTER — TELEMEDICINE (OUTPATIENT)
Dept: PULMONOLOGY | Facility: CLINIC | Age: 75
End: 2020-04-23
Payer: MEDICARE

## 2020-04-23 DIAGNOSIS — J44.1 COPD EXACERBATION (HCC): ICD-10-CM

## 2020-04-23 PROCEDURE — G2061 QUAL NONMD EST PT 5-10M: HCPCS

## 2020-04-28 ENCOUNTER — APPOINTMENT (OUTPATIENT)
Dept: PULMONOLOGY | Facility: CLINIC | Age: 75
End: 2020-04-28
Payer: MEDICARE

## 2020-04-29 ENCOUNTER — PATIENT OUTREACH (OUTPATIENT)
Dept: FAMILY MEDICINE CLINIC | Facility: CLINIC | Age: 75
End: 2020-04-29

## 2020-04-30 ENCOUNTER — TELEMEDICINE (OUTPATIENT)
Dept: PULMONOLOGY | Facility: CLINIC | Age: 75
End: 2020-04-30
Payer: MEDICARE

## 2020-04-30 ENCOUNTER — APPOINTMENT (OUTPATIENT)
Dept: PULMONOLOGY | Facility: CLINIC | Age: 75
End: 2020-04-30
Payer: MEDICARE

## 2020-04-30 DIAGNOSIS — J44.1 COPD EXACERBATION (HCC): ICD-10-CM

## 2020-04-30 PROCEDURE — G2061 QUAL NONMD EST PT 5-10M: HCPCS

## 2020-05-04 ENCOUNTER — PATIENT OUTREACH (OUTPATIENT)
Dept: FAMILY MEDICINE CLINIC | Facility: CLINIC | Age: 75
End: 2020-05-04

## 2020-05-04 ENCOUNTER — TELEPHONE (OUTPATIENT)
Dept: FAMILY MEDICINE CLINIC | Facility: CLINIC | Age: 75
End: 2020-05-04

## 2020-05-04 DIAGNOSIS — J31.0 RHINITIS, UNSPECIFIED TYPE: ICD-10-CM

## 2020-05-04 DIAGNOSIS — M79.89 CALF SWELLING: Primary | ICD-10-CM

## 2020-05-04 DIAGNOSIS — J96.11 CHRONIC RESPIRATORY FAILURE WITH HYPOXIA (HCC): ICD-10-CM

## 2020-05-04 DIAGNOSIS — N40.1 BENIGN PROSTATIC HYPERPLASIA WITH URINARY FREQUENCY: ICD-10-CM

## 2020-05-04 DIAGNOSIS — R35.0 BENIGN PROSTATIC HYPERPLASIA WITH URINARY FREQUENCY: ICD-10-CM

## 2020-05-05 ENCOUNTER — HOSPITAL ENCOUNTER (OUTPATIENT)
Dept: RADIOLOGY | Facility: HOSPITAL | Age: 75
Discharge: HOME | End: 2020-05-05
Payer: MEDICARE

## 2020-05-05 ENCOUNTER — TELEMEDICINE (OUTPATIENT)
Dept: PULMONOLOGY | Facility: MEDICAL CENTER | Age: 75
End: 2020-05-05

## 2020-05-05 DIAGNOSIS — M79.89 LEFT LEG SWELLING: Primary | ICD-10-CM

## 2020-05-05 DIAGNOSIS — M79.89 LEFT LEG SWELLING: ICD-10-CM

## 2020-05-05 DIAGNOSIS — R06.02 SHORTNESS OF BREATH: ICD-10-CM

## 2020-05-05 DIAGNOSIS — J96.11 CHRONIC RESPIRATORY FAILURE WITH HYPOXIA (HCC): ICD-10-CM

## 2020-05-05 DIAGNOSIS — R91.8 LUNG NODULES: ICD-10-CM

## 2020-05-05 DIAGNOSIS — E88.01 AAT (ALPHA-1-ANTITRYPSIN) DEFICIENCY (HCC): ICD-10-CM

## 2020-05-05 DIAGNOSIS — J43.2 CENTRILOBULAR EMPHYSEMA (HCC): ICD-10-CM

## 2020-05-05 DIAGNOSIS — M79.89 CALF SWELLING: ICD-10-CM

## 2020-05-05 PROBLEM — J44.1 COPD EXACERBATION (HCC): Status: RESOLVED | Noted: 2019-12-29 | Resolved: 2020-05-05

## 2020-05-05 PROCEDURE — 93971 EXTREMITY STUDY: CPT

## 2020-05-05 PROCEDURE — 71275 CT ANGIOGRAPHY CHEST: CPT

## 2020-05-05 PROCEDURE — 93971 EXTREMITY STUDY: CPT | Performed by: SURGERY

## 2020-05-05 PROCEDURE — 99443 PR PHYS/QHP TELEPHONE EVALUATION 21-30 MIN: CPT | Performed by: NURSE PRACTITIONER

## 2020-05-05 RX ORDER — FLUTICASONE PROPIONATE 50 MCG
SPRAY, SUSPENSION (ML) NASAL
Qty: 16 G | Refills: 0 | Status: SHIPPED | OUTPATIENT
Start: 2020-05-05 | End: 2020-05-13

## 2020-05-05 RX ORDER — BUSPIRONE HYDROCHLORIDE 10 MG/1
TABLET ORAL
Qty: 90 TABLET | Refills: 0 | Status: SHIPPED | OUTPATIENT
Start: 2020-05-05 | End: 2020-08-19

## 2020-05-05 RX ORDER — TAMSULOSIN HYDROCHLORIDE 0.4 MG/1
CAPSULE ORAL
Qty: 30 CAPSULE | Refills: 0 | Status: SHIPPED | OUTPATIENT
Start: 2020-05-05 | End: 2020-06-11

## 2020-05-05 RX ADMIN — IODIXANOL 85 ML: 320 INJECTION, SOLUTION INTRAVASCULAR at 13:57

## 2020-05-06 DIAGNOSIS — R91.1 LUNG NODULE SEEN ON IMAGING STUDY: Primary | ICD-10-CM

## 2020-05-11 ENCOUNTER — APPOINTMENT (EMERGENCY)
Dept: RADIOLOGY | Facility: HOSPITAL | Age: 75
End: 2020-05-11
Payer: MEDICARE

## 2020-05-11 ENCOUNTER — TELEPHONE (OUTPATIENT)
Dept: FAMILY MEDICINE CLINIC | Facility: CLINIC | Age: 75
End: 2020-05-11

## 2020-05-11 ENCOUNTER — HOSPITAL ENCOUNTER (EMERGENCY)
Facility: HOSPITAL | Age: 75
Discharge: HOME | End: 2020-05-11
Attending: EMERGENCY MEDICINE | Admitting: EMERGENCY MEDICINE
Payer: MEDICARE

## 2020-05-11 VITALS
TEMPERATURE: 98.5 F | DIASTOLIC BLOOD PRESSURE: 91 MMHG | HEART RATE: 76 BPM | RESPIRATION RATE: 18 BRPM | OXYGEN SATURATION: 97 % | BODY MASS INDEX: 24.57 KG/M2 | SYSTOLIC BLOOD PRESSURE: 185 MMHG | WEIGHT: 207.23 LBS

## 2020-05-11 DIAGNOSIS — R06.00 DYSPNEA: ICD-10-CM

## 2020-05-11 DIAGNOSIS — R42 LIGHTHEADEDNESS: ICD-10-CM

## 2020-05-11 DIAGNOSIS — J44.1 COPD EXACERBATION (HCC): ICD-10-CM

## 2020-05-11 DIAGNOSIS — R53.1 GENERALIZED WEAKNESS: Primary | ICD-10-CM

## 2020-05-11 LAB
ALBUMIN SERPL BCP-MCNC: 3.9 G/DL (ref 3.5–5)
ALP SERPL-CCNC: 61 U/L (ref 46–116)
ALT SERPL W P-5'-P-CCNC: 32 U/L (ref 12–78)
ANION GAP SERPL CALCULATED.3IONS-SCNC: 11 MMOL/L (ref 4–13)
APTT PPP: 30 SECONDS (ref 23–37)
AST SERPL W P-5'-P-CCNC: 21 U/L (ref 5–45)
BASOPHILS # BLD AUTO: 0.07 THOUSANDS/ΜL (ref 0–0.1)
BASOPHILS NFR BLD AUTO: 1 % (ref 0–1)
BILIRUB SERPL-MCNC: 1.1 MG/DL (ref 0.2–1)
BILIRUB UR QL STRIP: NEGATIVE
BUN SERPL-MCNC: 10 MG/DL (ref 5–25)
CALCIUM SERPL-MCNC: 9 MG/DL (ref 8.3–10.1)
CHLORIDE SERPL-SCNC: 102 MMOL/L (ref 100–108)
CLARITY UR: CLEAR
CO2 SERPL-SCNC: 26 MMOL/L (ref 21–32)
COLOR UR: YELLOW
CREAT SERPL-MCNC: 1.1 MG/DL (ref 0.6–1.3)
EOSINOPHIL # BLD AUTO: 0.21 THOUSAND/ΜL (ref 0–0.61)
EOSINOPHIL NFR BLD AUTO: 3 % (ref 0–6)
ERYTHROCYTE [DISTWIDTH] IN BLOOD BY AUTOMATED COUNT: 13 % (ref 11.6–15.1)
GFR SERPL CREATININE-BSD FRML MDRD: 65 ML/MIN/1.73SQ M
GLUCOSE SERPL-MCNC: 91 MG/DL (ref 65–140)
GLUCOSE UR STRIP-MCNC: NEGATIVE MG/DL
HCT VFR BLD AUTO: 38 % (ref 36.5–49.3)
HGB BLD-MCNC: 13.4 G/DL (ref 12–17)
HGB UR QL STRIP.AUTO: NEGATIVE
IMM GRANULOCYTES # BLD AUTO: 0.03 THOUSAND/UL (ref 0–0.2)
IMM GRANULOCYTES NFR BLD AUTO: 0 % (ref 0–2)
INR PPP: 1.02 (ref 0.84–1.19)
KETONES UR STRIP-MCNC: NEGATIVE MG/DL
LEUKOCYTE ESTERASE UR QL STRIP: NEGATIVE
LYMPHOCYTES # BLD AUTO: 1.68 THOUSANDS/ΜL (ref 0.6–4.47)
LYMPHOCYTES NFR BLD AUTO: 24 % (ref 14–44)
MCH RBC QN AUTO: 33.2 PG (ref 26.8–34.3)
MCHC RBC AUTO-ENTMCNC: 35.3 G/DL (ref 31.4–37.4)
MCV RBC AUTO: 94 FL (ref 82–98)
MONOCYTES # BLD AUTO: 0.8 THOUSAND/ΜL (ref 0.17–1.22)
MONOCYTES NFR BLD AUTO: 11 % (ref 4–12)
NEUTROPHILS # BLD AUTO: 4.21 THOUSANDS/ΜL (ref 1.85–7.62)
NEUTS SEG NFR BLD AUTO: 61 % (ref 43–75)
NITRITE UR QL STRIP: NEGATIVE
NRBC BLD AUTO-RTO: 0 /100 WBCS
NT-PROBNP SERPL-MCNC: 134 PG/ML
PH UR STRIP.AUTO: 8 [PH]
PLATELET # BLD AUTO: 156 THOUSANDS/UL (ref 149–390)
PMV BLD AUTO: 8.5 FL (ref 8.9–12.7)
POTASSIUM SERPL-SCNC: 4 MMOL/L (ref 3.5–5.3)
PROT SERPL-MCNC: 7 G/DL (ref 6.4–8.2)
PROT UR STRIP-MCNC: NEGATIVE MG/DL
PROTHROMBIN TIME: 13.8 SECONDS (ref 11.6–14.5)
RBC # BLD AUTO: 4.04 MILLION/UL (ref 3.88–5.62)
SARS-COV-2 RNA RESP QL NAA+PROBE: NEGATIVE
SODIUM SERPL-SCNC: 139 MMOL/L (ref 136–145)
SP GR UR STRIP.AUTO: 1.01 (ref 1–1.03)
TROPONIN I SERPL-MCNC: <0.02 NG/ML
TSH SERPL DL<=0.05 MIU/L-ACNC: 2.04 UIU/ML (ref 0.36–3.74)
UROBILINOGEN UR QL STRIP.AUTO: 0.2 E.U./DL
WBC # BLD AUTO: 7 THOUSAND/UL (ref 4.31–10.16)

## 2020-05-11 PROCEDURE — 99285 EMERGENCY DEPT VISIT HI MDM: CPT | Performed by: EMERGENCY MEDICINE

## 2020-05-11 PROCEDURE — 71045 X-RAY EXAM CHEST 1 VIEW: CPT

## 2020-05-11 PROCEDURE — 84443 ASSAY THYROID STIM HORMONE: CPT | Performed by: EMERGENCY MEDICINE

## 2020-05-11 PROCEDURE — 96361 HYDRATE IV INFUSION ADD-ON: CPT

## 2020-05-11 PROCEDURE — 36415 COLL VENOUS BLD VENIPUNCTURE: CPT | Performed by: EMERGENCY MEDICINE

## 2020-05-11 PROCEDURE — 99285 EMERGENCY DEPT VISIT HI MDM: CPT

## 2020-05-11 PROCEDURE — 81003 URINALYSIS AUTO W/O SCOPE: CPT | Performed by: EMERGENCY MEDICINE

## 2020-05-11 PROCEDURE — 85025 COMPLETE CBC W/AUTO DIFF WBC: CPT | Performed by: EMERGENCY MEDICINE

## 2020-05-11 PROCEDURE — 84484 ASSAY OF TROPONIN QUANT: CPT | Performed by: EMERGENCY MEDICINE

## 2020-05-11 PROCEDURE — 93005 ELECTROCARDIOGRAM TRACING: CPT

## 2020-05-11 PROCEDURE — 85730 THROMBOPLASTIN TIME PARTIAL: CPT | Performed by: EMERGENCY MEDICINE

## 2020-05-11 PROCEDURE — 83880 ASSAY OF NATRIURETIC PEPTIDE: CPT | Performed by: EMERGENCY MEDICINE

## 2020-05-11 PROCEDURE — 87635 SARS-COV-2 COVID-19 AMP PRB: CPT | Performed by: EMERGENCY MEDICINE

## 2020-05-11 PROCEDURE — 96374 THER/PROPH/DIAG INJ IV PUSH: CPT

## 2020-05-11 PROCEDURE — 85610 PROTHROMBIN TIME: CPT | Performed by: EMERGENCY MEDICINE

## 2020-05-11 PROCEDURE — 80053 COMPREHEN METABOLIC PANEL: CPT | Performed by: EMERGENCY MEDICINE

## 2020-05-11 RX ORDER — PREDNISONE 10 MG/1
TABLET ORAL
Qty: 20 TABLET | Refills: 0 | Status: SHIPPED | OUTPATIENT
Start: 2020-05-11 | End: 2020-08-19 | Stop reason: ALTCHOICE

## 2020-05-11 RX ORDER — METHYLPREDNISOLONE SODIUM SUCCINATE 125 MG/2ML
60 INJECTION, POWDER, LYOPHILIZED, FOR SOLUTION INTRAMUSCULAR; INTRAVENOUS ONCE
Status: COMPLETED | OUTPATIENT
Start: 2020-05-11 | End: 2020-05-11

## 2020-05-11 RX ADMIN — METHYLPREDNISOLONE SODIUM SUCCINATE 60 MG: 125 INJECTION, POWDER, FOR SOLUTION INTRAMUSCULAR; INTRAVENOUS at 16:17

## 2020-05-11 RX ADMIN — SODIUM CHLORIDE 1000 ML: 0.9 INJECTION, SOLUTION INTRAVENOUS at 15:01

## 2020-05-12 ENCOUNTER — TELEPHONE (OUTPATIENT)
Dept: PULMONOLOGY | Facility: MEDICAL CENTER | Age: 75
End: 2020-05-12

## 2020-05-12 DIAGNOSIS — J31.0 RHINITIS, UNSPECIFIED TYPE: ICD-10-CM

## 2020-05-13 DIAGNOSIS — K21.00 GASTROESOPHAGEAL REFLUX DISEASE WITH ESOPHAGITIS: ICD-10-CM

## 2020-05-13 DIAGNOSIS — G47.01 INSOMNIA DUE TO MEDICAL CONDITION: ICD-10-CM

## 2020-05-13 LAB
ATRIAL RATE: 74 BPM
P AXIS: -24 DEGREES
PR INTERVAL: 150 MS
QRS AXIS: 16 DEGREES
QRSD INTERVAL: 98 MS
QT INTERVAL: 416 MS
QTC INTERVAL: 461 MS
T WAVE AXIS: -1 DEGREES
VENTRICULAR RATE: 74 BPM

## 2020-05-13 PROCEDURE — 93010 ELECTROCARDIOGRAM REPORT: CPT | Performed by: INTERNAL MEDICINE

## 2020-05-13 RX ORDER — ZOLPIDEM TARTRATE 5 MG/1
TABLET ORAL
Qty: 30 TABLET | Refills: 0 | Status: SHIPPED | OUTPATIENT
Start: 2020-05-13 | End: 2020-06-11 | Stop reason: ALTCHOICE

## 2020-05-13 RX ORDER — METOCLOPRAMIDE 10 MG/1
TABLET ORAL
Qty: 60 TABLET | Refills: 0 | Status: SHIPPED | OUTPATIENT
Start: 2020-05-13 | End: 2020-06-18

## 2020-05-13 RX ORDER — FLUTICASONE PROPIONATE 50 MCG
SPRAY, SUSPENSION (ML) NASAL
Qty: 16 G | Refills: 0 | Status: SHIPPED | OUTPATIENT
Start: 2020-05-13 | End: 2020-07-06

## 2020-05-16 ENCOUNTER — NURSE TRIAGE (OUTPATIENT)
Dept: OTHER | Facility: OTHER | Age: 75
End: 2020-05-16

## 2020-05-16 ENCOUNTER — TELEPHONE (OUTPATIENT)
Dept: OTHER | Facility: HOSPITAL | Age: 75
End: 2020-05-16

## 2020-05-16 DIAGNOSIS — R15.9 STOOL INCONTINENCE: ICD-10-CM

## 2020-05-16 DIAGNOSIS — R32 URINARY INCONTINENCE: Primary | ICD-10-CM

## 2020-05-17 ENCOUNTER — APPOINTMENT (EMERGENCY)
Dept: RADIOLOGY | Facility: HOSPITAL | Age: 75
End: 2020-05-17
Payer: MEDICARE

## 2020-05-17 ENCOUNTER — HOSPITAL ENCOUNTER (EMERGENCY)
Facility: HOSPITAL | Age: 75
Discharge: HOME | End: 2020-05-17
Attending: EMERGENCY MEDICINE | Admitting: EMERGENCY MEDICINE
Payer: MEDICARE

## 2020-05-17 ENCOUNTER — NURSE TRIAGE (OUTPATIENT)
Dept: OTHER | Facility: OTHER | Age: 75
End: 2020-05-17

## 2020-05-17 VITALS
DIASTOLIC BLOOD PRESSURE: 82 MMHG | OXYGEN SATURATION: 98 % | SYSTOLIC BLOOD PRESSURE: 163 MMHG | RESPIRATION RATE: 17 BRPM | TEMPERATURE: 97.9 F | HEART RATE: 68 BPM

## 2020-05-17 DIAGNOSIS — F41.9 ANXIETY: ICD-10-CM

## 2020-05-17 DIAGNOSIS — R06.02 SHORTNESS OF BREATH: Primary | ICD-10-CM

## 2020-05-17 LAB
ALBUMIN SERPL BCP-MCNC: 4.1 G/DL (ref 3.5–5)
ALP SERPL-CCNC: 64 U/L (ref 46–116)
ALT SERPL W P-5'-P-CCNC: 52 U/L (ref 12–78)
ANION GAP SERPL CALCULATED.3IONS-SCNC: 10 MMOL/L (ref 4–13)
APTT PPP: 27 SECONDS (ref 23–37)
AST SERPL W P-5'-P-CCNC: 23 U/L (ref 5–45)
BASOPHILS # BLD AUTO: 0.06 THOUSANDS/ΜL (ref 0–0.1)
BASOPHILS NFR BLD AUTO: 1 % (ref 0–1)
BILIRUB SERPL-MCNC: 1.4 MG/DL (ref 0.2–1)
BUN SERPL-MCNC: 21 MG/DL (ref 5–25)
CALCIUM SERPL-MCNC: 9.3 MG/DL (ref 8.3–10.1)
CHLORIDE SERPL-SCNC: 102 MMOL/L (ref 100–108)
CO2 SERPL-SCNC: 24 MMOL/L (ref 21–32)
CREAT SERPL-MCNC: 1.19 MG/DL (ref 0.6–1.3)
EOSINOPHIL # BLD AUTO: 0.19 THOUSAND/ΜL (ref 0–0.61)
EOSINOPHIL NFR BLD AUTO: 2 % (ref 0–6)
ERYTHROCYTE [DISTWIDTH] IN BLOOD BY AUTOMATED COUNT: 13.2 % (ref 11.6–15.1)
GFR SERPL CREATININE-BSD FRML MDRD: 59 ML/MIN/1.73SQ M
GLUCOSE SERPL-MCNC: 108 MG/DL (ref 65–140)
HCT VFR BLD AUTO: 41.3 % (ref 36.5–49.3)
HGB BLD-MCNC: 14.6 G/DL (ref 12–17)
IMM GRANULOCYTES # BLD AUTO: 0.11 THOUSAND/UL (ref 0–0.2)
IMM GRANULOCYTES NFR BLD AUTO: 1 % (ref 0–2)
INR PPP: 1.01 (ref 0.84–1.19)
LYMPHOCYTES # BLD AUTO: 2.22 THOUSANDS/ΜL (ref 0.6–4.47)
LYMPHOCYTES NFR BLD AUTO: 24 % (ref 14–44)
MCH RBC QN AUTO: 33.3 PG (ref 26.8–34.3)
MCHC RBC AUTO-ENTMCNC: 35.4 G/DL (ref 31.4–37.4)
MCV RBC AUTO: 94 FL (ref 82–98)
MONOCYTES # BLD AUTO: 0.73 THOUSAND/ΜL (ref 0.17–1.22)
MONOCYTES NFR BLD AUTO: 8 % (ref 4–12)
NEUTROPHILS # BLD AUTO: 5.81 THOUSANDS/ΜL (ref 1.85–7.62)
NEUTS SEG NFR BLD AUTO: 64 % (ref 43–75)
NRBC BLD AUTO-RTO: 0 /100 WBCS
NT-PROBNP SERPL-MCNC: 230 PG/ML
PLATELET # BLD AUTO: 191 THOUSANDS/UL (ref 149–390)
PMV BLD AUTO: 8.8 FL (ref 8.9–12.7)
POTASSIUM SERPL-SCNC: 3.8 MMOL/L (ref 3.5–5.3)
PROT SERPL-MCNC: 7 G/DL (ref 6.4–8.2)
PROTHROMBIN TIME: 13.6 SECONDS (ref 11.6–14.5)
RBC # BLD AUTO: 4.39 MILLION/UL (ref 3.88–5.62)
SODIUM SERPL-SCNC: 136 MMOL/L (ref 136–145)
TROPONIN I SERPL-MCNC: <0.02 NG/ML
TROPONIN I SERPL-MCNC: <0.02 NG/ML
WBC # BLD AUTO: 9.12 THOUSAND/UL (ref 4.31–10.16)

## 2020-05-17 PROCEDURE — 83880 ASSAY OF NATRIURETIC PEPTIDE: CPT | Performed by: EMERGENCY MEDICINE

## 2020-05-17 PROCEDURE — 85610 PROTHROMBIN TIME: CPT | Performed by: EMERGENCY MEDICINE

## 2020-05-17 PROCEDURE — 85730 THROMBOPLASTIN TIME PARTIAL: CPT | Performed by: EMERGENCY MEDICINE

## 2020-05-17 PROCEDURE — 80053 COMPREHEN METABOLIC PANEL: CPT | Performed by: EMERGENCY MEDICINE

## 2020-05-17 PROCEDURE — 36415 COLL VENOUS BLD VENIPUNCTURE: CPT | Performed by: EMERGENCY MEDICINE

## 2020-05-17 PROCEDURE — 96374 THER/PROPH/DIAG INJ IV PUSH: CPT

## 2020-05-17 PROCEDURE — 71045 X-RAY EXAM CHEST 1 VIEW: CPT

## 2020-05-17 PROCEDURE — 84484 ASSAY OF TROPONIN QUANT: CPT | Performed by: EMERGENCY MEDICINE

## 2020-05-17 PROCEDURE — 85025 COMPLETE CBC W/AUTO DIFF WBC: CPT | Performed by: EMERGENCY MEDICINE

## 2020-05-17 PROCEDURE — 99285 EMERGENCY DEPT VISIT HI MDM: CPT | Performed by: EMERGENCY MEDICINE

## 2020-05-17 PROCEDURE — 96376 TX/PRO/DX INJ SAME DRUG ADON: CPT

## 2020-05-17 PROCEDURE — 93005 ELECTROCARDIOGRAM TRACING: CPT

## 2020-05-17 PROCEDURE — 99285 EMERGENCY DEPT VISIT HI MDM: CPT

## 2020-05-17 RX ORDER — LORAZEPAM 1 MG/1
1 TABLET ORAL 2 TIMES DAILY
Qty: 20 TABLET | Refills: 0 | Status: SHIPPED | OUTPATIENT
Start: 2020-05-17 | End: 2020-05-27 | Stop reason: SDUPTHER

## 2020-05-17 RX ORDER — LORAZEPAM 2 MG/ML
1 INJECTION INTRAMUSCULAR ONCE
Status: COMPLETED | OUTPATIENT
Start: 2020-05-17 | End: 2020-05-17

## 2020-05-17 RX ORDER — PREDNISONE 20 MG/1
40 TABLET ORAL ONCE
Status: COMPLETED | OUTPATIENT
Start: 2020-05-17 | End: 2020-05-17

## 2020-05-17 RX ADMIN — PREDNISONE 40 MG: 20 TABLET ORAL at 10:51

## 2020-05-17 RX ADMIN — LORAZEPAM 1 MG: 2 INJECTION INTRAMUSCULAR; INTRAVENOUS at 08:55

## 2020-05-17 RX ADMIN — LORAZEPAM 1 MG: 2 INJECTION INTRAMUSCULAR; INTRAVENOUS at 10:52

## 2020-05-20 ENCOUNTER — PATIENT OUTREACH (OUTPATIENT)
Dept: CASE MANAGEMENT | Facility: HOSPITAL | Age: 75
End: 2020-05-20

## 2020-05-24 LAB
ATRIAL RATE: 73 BPM
P AXIS: 1 DEGREES
PR INTERVAL: 144 MS
QRS AXIS: 46 DEGREES
QRSD INTERVAL: 96 MS
QT INTERVAL: 402 MS
QTC INTERVAL: 442 MS
T WAVE AXIS: 38 DEGREES
VENTRICULAR RATE: 73 BPM

## 2020-05-24 PROCEDURE — 93010 ELECTROCARDIOGRAM REPORT: CPT | Performed by: INTERNAL MEDICINE

## 2020-05-26 ENCOUNTER — TELEMEDICINE (OUTPATIENT)
Dept: FAMILY MEDICINE CLINIC | Facility: CLINIC | Age: 75
End: 2020-05-26
Payer: MEDICARE

## 2020-05-26 DIAGNOSIS — I10 ESSENTIAL HYPERTENSION: ICD-10-CM

## 2020-05-26 DIAGNOSIS — F41.9 ANXIETY: Primary | ICD-10-CM

## 2020-05-26 PROCEDURE — 99442 PR PHYS/QHP TELEPHONE EVALUATION 11-20 MIN: CPT | Performed by: FAMILY MEDICINE

## 2020-05-27 DIAGNOSIS — F41.9 ANXIETY: ICD-10-CM

## 2020-05-27 RX ORDER — LORAZEPAM 1 MG/1
1 TABLET ORAL 2 TIMES DAILY
Qty: 20 TABLET | Refills: 0 | Status: SHIPPED | OUTPATIENT
Start: 2020-05-27 | End: 2020-06-12 | Stop reason: SDUPTHER

## 2020-05-30 ENCOUNTER — NURSE TRIAGE (OUTPATIENT)
Dept: OTHER | Facility: OTHER | Age: 75
End: 2020-05-30

## 2020-06-03 ENCOUNTER — TELEPHONE (OUTPATIENT)
Dept: PULMONOLOGY | Facility: MEDICAL CENTER | Age: 75
End: 2020-06-03

## 2020-06-05 ENCOUNTER — OFFICE VISIT (OUTPATIENT)
Dept: PULMONOLOGY | Facility: MEDICAL CENTER | Age: 75
End: 2020-06-05
Payer: MEDICARE

## 2020-06-05 VITALS
TEMPERATURE: 99.3 F | HEIGHT: 77 IN | DIASTOLIC BLOOD PRESSURE: 84 MMHG | WEIGHT: 199 LBS | HEART RATE: 90 BPM | OXYGEN SATURATION: 97 % | BODY MASS INDEX: 23.5 KG/M2 | SYSTOLIC BLOOD PRESSURE: 142 MMHG | RESPIRATION RATE: 12 BRPM

## 2020-06-05 DIAGNOSIS — J96.11 CHRONIC RESPIRATORY FAILURE WITH HYPOXIA (HCC): Primary | ICD-10-CM

## 2020-06-05 DIAGNOSIS — R91.1 LUNG NODULE: ICD-10-CM

## 2020-06-05 DIAGNOSIS — E88.01 AAT (ALPHA-1-ANTITRYPSIN) DEFICIENCY (HCC): ICD-10-CM

## 2020-06-05 DIAGNOSIS — J43.2 CENTRILOBULAR EMPHYSEMA (HCC): ICD-10-CM

## 2020-06-05 PROCEDURE — 99214 OFFICE O/P EST MOD 30 MIN: CPT | Performed by: INTERNAL MEDICINE

## 2020-06-05 PROCEDURE — 1036F TOBACCO NON-USER: CPT | Performed by: INTERNAL MEDICINE

## 2020-06-05 PROCEDURE — 3079F DIAST BP 80-89 MM HG: CPT | Performed by: INTERNAL MEDICINE

## 2020-06-05 PROCEDURE — 3077F SYST BP >= 140 MM HG: CPT | Performed by: INTERNAL MEDICINE

## 2020-06-05 PROCEDURE — 1160F RVW MEDS BY RX/DR IN RCRD: CPT | Performed by: INTERNAL MEDICINE

## 2020-06-05 PROCEDURE — 4040F PNEUMOC VAC/ADMIN/RCVD: CPT | Performed by: INTERNAL MEDICINE

## 2020-06-05 RX ORDER — PREDNISONE 20 MG/1
TABLET ORAL
Qty: 15 TABLET | Refills: 0 | Status: SHIPPED | OUTPATIENT
Start: 2020-06-05 | End: 2020-08-19 | Stop reason: ALTCHOICE

## 2020-06-06 ENCOUNTER — TELEPHONE (OUTPATIENT)
Dept: OTHER | Facility: OTHER | Age: 75
End: 2020-06-06

## 2020-06-10 DIAGNOSIS — F41.9 ANXIETY: ICD-10-CM

## 2020-06-10 DIAGNOSIS — G47.01 INSOMNIA DUE TO MEDICAL CONDITION: ICD-10-CM

## 2020-06-10 RX ORDER — LORAZEPAM 1 MG/1
1 TABLET ORAL 2 TIMES DAILY
Qty: 20 TABLET | Refills: 0 | Status: CANCELLED | OUTPATIENT
Start: 2020-06-10 | End: 2020-06-20

## 2020-06-10 NOTE — TELEPHONE ENCOUNTER
Herman Shields is requesting a callback from Dr Darryl Amador  He states he needs to speak with him directly

## 2020-06-10 NOTE — TELEPHONE ENCOUNTER
Patient returned a call from Dr Yamel Toth, was unable to connect him to the office via backline  Patient states that the call was about some prescriptions  Please call back to advise

## 2020-06-11 ENCOUNTER — TELEPHONE (OUTPATIENT)
Dept: FAMILY MEDICINE CLINIC | Facility: CLINIC | Age: 75
End: 2020-06-11

## 2020-06-11 ENCOUNTER — TELEMEDICINE (OUTPATIENT)
Dept: FAMILY MEDICINE CLINIC | Facility: CLINIC | Age: 75
End: 2020-06-11
Payer: MEDICARE

## 2020-06-11 DIAGNOSIS — F41.9 ANXIETY: ICD-10-CM

## 2020-06-11 DIAGNOSIS — N40.1 BENIGN PROSTATIC HYPERPLASIA WITH URINARY FREQUENCY: ICD-10-CM

## 2020-06-11 DIAGNOSIS — G47.01 INSOMNIA DUE TO MEDICAL CONDITION: Primary | ICD-10-CM

## 2020-06-11 DIAGNOSIS — I10 ESSENTIAL HYPERTENSION: ICD-10-CM

## 2020-06-11 DIAGNOSIS — R35.0 BENIGN PROSTATIC HYPERPLASIA WITH URINARY FREQUENCY: ICD-10-CM

## 2020-06-11 PROCEDURE — 99441 PR PHYS/QHP TELEPHONE EVALUATION 5-10 MIN: CPT | Performed by: FAMILY MEDICINE

## 2020-06-11 RX ORDER — TAMSULOSIN HYDROCHLORIDE 0.4 MG/1
CAPSULE ORAL
Qty: 90 CAPSULE | Refills: 3 | Status: SHIPPED | OUTPATIENT
Start: 2020-06-11 | End: 2020-06-15

## 2020-06-11 RX ORDER — TRAZODONE HYDROCHLORIDE 50 MG/1
50 TABLET ORAL
Qty: 30 TABLET | Refills: 0 | Status: SHIPPED | OUTPATIENT
Start: 2020-06-11 | End: 2020-07-07

## 2020-06-12 DIAGNOSIS — F41.9 ANXIETY: ICD-10-CM

## 2020-06-12 RX ORDER — AMLODIPINE BESYLATE 10 MG/1
TABLET ORAL
Qty: 30 TABLET | Refills: 0 | Status: SHIPPED | OUTPATIENT
Start: 2020-06-12 | End: 2020-07-06 | Stop reason: SDUPTHER

## 2020-06-12 RX ORDER — ZOLPIDEM TARTRATE 5 MG/1
5 TABLET ORAL
Qty: 30 TABLET | Refills: 0 | OUTPATIENT
Start: 2020-06-12

## 2020-06-12 RX ORDER — LORAZEPAM 1 MG/1
1 TABLET ORAL 2 TIMES DAILY
Qty: 60 TABLET | Refills: 0 | Status: SHIPPED | OUTPATIENT
Start: 2020-06-12 | End: 2020-07-09 | Stop reason: SDUPTHER

## 2020-06-15 ENCOUNTER — TELEMEDICINE (OUTPATIENT)
Dept: FAMILY MEDICINE CLINIC | Facility: CLINIC | Age: 75
End: 2020-06-15
Payer: MEDICARE

## 2020-06-15 DIAGNOSIS — N40.0 BPH (BENIGN PROSTATIC HYPERPLASIA): ICD-10-CM

## 2020-06-15 DIAGNOSIS — J43.2 CENTRILOBULAR EMPHYSEMA (HCC): ICD-10-CM

## 2020-06-15 DIAGNOSIS — I95.1 ORTHOSTASIS: ICD-10-CM

## 2020-06-15 DIAGNOSIS — E88.01 AAT (ALPHA-1-ANTITRYPSIN) DEFICIENCY (HCC): Primary | ICD-10-CM

## 2020-06-15 PROCEDURE — 99442 PR PHYS/QHP TELEPHONE EVALUATION 11-20 MIN: CPT | Performed by: FAMILY MEDICINE

## 2020-06-15 RX ORDER — TAMSULOSIN HYDROCHLORIDE 0.4 MG/1
CAPSULE ORAL
Qty: 30 CAPSULE | Refills: 3 | Status: SHIPPED | OUTPATIENT
Start: 2020-06-15 | End: 2020-07-06

## 2020-06-16 ENCOUNTER — TELEPHONE (OUTPATIENT)
Dept: PULMONOLOGY | Facility: MEDICAL CENTER | Age: 75
End: 2020-06-16

## 2020-06-16 ENCOUNTER — TELEPHONE (OUTPATIENT)
Dept: FAMILY MEDICINE CLINIC | Facility: CLINIC | Age: 75
End: 2020-06-16

## 2020-06-16 DIAGNOSIS — N40.0 BPH (BENIGN PROSTATIC HYPERPLASIA): ICD-10-CM

## 2020-06-18 DIAGNOSIS — K21.00 GASTROESOPHAGEAL REFLUX DISEASE WITH ESOPHAGITIS: ICD-10-CM

## 2020-06-18 RX ORDER — METOCLOPRAMIDE 10 MG/1
TABLET ORAL
Qty: 60 TABLET | Refills: 0 | Status: SHIPPED | OUTPATIENT
Start: 2020-06-18 | End: 2020-07-31

## 2020-06-19 PROBLEM — R91.1 LUNG NODULE: Status: ACTIVE | Noted: 2018-01-31

## 2020-07-02 ENCOUNTER — TELEPHONE (OUTPATIENT)
Dept: OTHER | Facility: OTHER | Age: 75
End: 2020-07-02

## 2020-07-02 NOTE — TELEPHONE ENCOUNTER
Horacio connect:  [phone] / Licha Field / [phone] / Patient called stating that he needed Dr Mark Castro to write a new order for his portable oxygen tank sent to [facility]  Please call patient back to discuss

## 2020-07-06 ENCOUNTER — OFFICE VISIT (OUTPATIENT)
Dept: FAMILY MEDICINE CLINIC | Facility: CLINIC | Age: 75
End: 2020-07-06
Payer: MEDICARE

## 2020-07-06 VITALS
HEART RATE: 85 BPM | HEIGHT: 77 IN | SYSTOLIC BLOOD PRESSURE: 150 MMHG | TEMPERATURE: 97.1 F | BODY MASS INDEX: 23.97 KG/M2 | OXYGEN SATURATION: 97 % | WEIGHT: 203 LBS | DIASTOLIC BLOOD PRESSURE: 78 MMHG

## 2020-07-06 DIAGNOSIS — N40.0 BPH (BENIGN PROSTATIC HYPERPLASIA): ICD-10-CM

## 2020-07-06 DIAGNOSIS — J43.2 CENTRILOBULAR EMPHYSEMA (HCC): ICD-10-CM

## 2020-07-06 DIAGNOSIS — I10 ESSENTIAL HYPERTENSION: ICD-10-CM

## 2020-07-06 DIAGNOSIS — J96.11 CHRONIC RESPIRATORY FAILURE WITH HYPOXIA (HCC): Primary | ICD-10-CM

## 2020-07-06 DIAGNOSIS — E88.01 AAT (ALPHA-1-ANTITRYPSIN) DEFICIENCY (HCC): Primary | ICD-10-CM

## 2020-07-06 DIAGNOSIS — J31.0 RHINITIS, UNSPECIFIED TYPE: ICD-10-CM

## 2020-07-06 PROCEDURE — 1036F TOBACCO NON-USER: CPT | Performed by: FAMILY MEDICINE

## 2020-07-06 PROCEDURE — 1160F RVW MEDS BY RX/DR IN RCRD: CPT | Performed by: FAMILY MEDICINE

## 2020-07-06 PROCEDURE — 99214 OFFICE O/P EST MOD 30 MIN: CPT | Performed by: FAMILY MEDICINE

## 2020-07-06 PROCEDURE — 3077F SYST BP >= 140 MM HG: CPT | Performed by: FAMILY MEDICINE

## 2020-07-06 PROCEDURE — 4040F PNEUMOC VAC/ADMIN/RCVD: CPT | Performed by: FAMILY MEDICINE

## 2020-07-06 PROCEDURE — 3078F DIAST BP <80 MM HG: CPT | Performed by: FAMILY MEDICINE

## 2020-07-06 RX ORDER — FLUTICASONE PROPIONATE 50 MCG
2 SPRAY, SUSPENSION (ML) NASAL DAILY
Qty: 16 G | Refills: 5 | Status: SHIPPED | OUTPATIENT
Start: 2020-07-06

## 2020-07-06 RX ORDER — AMLODIPINE BESYLATE 10 MG/1
10 TABLET ORAL DAILY
Qty: 30 TABLET | Refills: 0 | Status: SHIPPED | OUTPATIENT
Start: 2020-07-06 | End: 2020-07-06

## 2020-07-06 RX ORDER — AMLODIPINE BESYLATE 10 MG/1
10 TABLET ORAL DAILY
Qty: 30 TABLET | Refills: 5 | Status: SHIPPED | OUTPATIENT
Start: 2020-07-06 | End: 2020-09-16 | Stop reason: HOSPADM

## 2020-07-06 RX ORDER — TAMSULOSIN HYDROCHLORIDE 0.4 MG/1
CAPSULE ORAL
Qty: 30 CAPSULE | Refills: 5 | Status: SHIPPED | OUTPATIENT
Start: 2020-07-06 | End: 2021-06-08 | Stop reason: SDUPTHER

## 2020-07-07 DIAGNOSIS — F41.9 ANXIETY: ICD-10-CM

## 2020-07-07 DIAGNOSIS — G47.01 INSOMNIA DUE TO MEDICAL CONDITION: ICD-10-CM

## 2020-07-07 RX ORDER — TRAZODONE HYDROCHLORIDE 50 MG/1
TABLET ORAL
Qty: 30 TABLET | Refills: 0 | Status: SHIPPED | OUTPATIENT
Start: 2020-07-07 | End: 2020-08-05

## 2020-07-09 DIAGNOSIS — F41.9 ANXIETY: ICD-10-CM

## 2020-07-10 RX ORDER — LORAZEPAM 1 MG/1
1 TABLET ORAL 2 TIMES DAILY
Qty: 60 TABLET | Refills: 0 | Status: SHIPPED | OUTPATIENT
Start: 2020-07-10 | End: 2020-08-10 | Stop reason: SDUPTHER

## 2020-07-10 RX ORDER — LORAZEPAM 1 MG/1
TABLET ORAL
Qty: 60 TABLET | Refills: 0 | OUTPATIENT
Start: 2020-07-10

## 2020-07-13 ENCOUNTER — PATIENT OUTREACH (OUTPATIENT)
Dept: CASE MANAGEMENT | Facility: HOSPITAL | Age: 75
End: 2020-07-13

## 2020-07-13 NOTE — PROGRESS NOTES
Patient was contacted to follow-up  We last spoke several months ago and he had a Pulmonary appt since that time  Cortney Kraus is awaiting a call from [facility] that would satisfy the prescription recently sent by Dr Jb Smith to discontinue the Life 2000 O2 system and begin using the Inogen  [facility] contacted Abelardo but did not have a date of delivery or re-evaluation  I sent a message to [facility] requesting an update  Cortney Kraus has several appts upcoming and would like to have the POC by next Thursday for a 2 hour procedure  Will continue to follow and keep Abelardo updated with any comunication from [facility]

## 2020-07-14 ENCOUNTER — PATIENT OUTREACH (OUTPATIENT)
Dept: CASE MANAGEMENT | Facility: HOSPITAL | Age: 75
End: 2020-07-14

## 2020-07-14 NOTE — PROGRESS NOTES
Patient contacted RT this AM to check on status of POC order  As per a message from [employer], RT confirmed that [facility] received the order and will be sending to Customer service to route to the Respiratory staff  Tonya Leahy has a number of appts upcoming and would like the geoffrey have the unit as soon as possible   RT communicated this information to [facility] this AM

## 2020-07-15 ENCOUNTER — CLINICAL SUPPORT (OUTPATIENT)
Dept: PULMONOLOGY | Facility: CLINIC | Age: 75
End: 2020-07-15
Payer: MEDICARE

## 2020-07-15 DIAGNOSIS — J43.2 CENTRILOBULAR EMPHYSEMA (HCC): ICD-10-CM

## 2020-07-15 PROCEDURE — G0424 PULMONARY REHAB W EXER: HCPCS

## 2020-07-15 NOTE — PROGRESS NOTES
Progress Note - Isreal Caal 67 y o  male MRN: [id number]    Encounter: [id number]      Assessment:  COPD  Alpha-1 antitrypsin deficiency  Intermittent diarrhea  Essential hypertension  Rhinitis  BPH  Positional lightheadedness    Plan:  Follow-up pulmonology  Patient to consider pulmonary rehab  Continue current medications  To consider Questran for diarrhea if continues the likely related to a alpha-1 antitrypsin deficiency  Patient had no orthostasis today so would not treat with midodrine currently  Recommending half dose of tamsulosin    Subjective:   Patient presents for follow-up of medical conditions  He has continued dyspnea on exertion which is at its baseline  He sometimes feels unsteady on his feet especially with change in position but has had no falls  He denies fever, sweats or chills  He has minimal cough and has periodic mucus production  as no ear pain or sore throat  He continues to uses oxygen but is hoping to get a lighter portable tank which he is in the process of obtaining  He continues to wish to live alone despite that is functional ability is slightly less  He states however he has only really limited by COVID these days and can get around and cook for himself  He also drives  Denies chest pain or palpitations  Has no nausea or vomiting  Denies melena or blood per rectum  Did not yet start the half dose of tamsulosin since we were trying to order that way and are hitting obstacles  Objective:     Vitals: Blood pressure 150/78, pulse 85, temperature (!) 97 1 °F (36 2 °C), height 6' 5" (1 956 m), weight 92 1 kg (203 lb), SpO2 97 %  ,Body mass index is 24 07 kg/m²  Physical Exam:  Cooperative in no acute distress  Gait is slow but steady with no foot drop  Eyes EOMI, ALEXYS, sclera clear, conjunctiva pink mouth moist without exudate neck supple without JVD although patient was in a seated position  Heart regular with no S3 or 4    Lungs fairly clear with some diminution of breath sounds at the bases but no active wheezing  Abdomen soft, nondistended nontender extremities with no open wounds but changes consistent with peripheral vascular disease of the lower extremities and minimal nonpitting edema  Neuro grossly nonfocal as patient was pleasant and smiling  Lab, Imaging and other studies: I have personally reviewed pertinent reports

## 2020-07-15 NOTE — PROGRESS NOTES
Pulmonary Rehabilitation Plan of Care   60 day      Today's date: 7/15/2020   Visits: 9  Patient name: Cash Sloan      : 1945  Age: 67 y o  MRN: [id number]  Referring Physician: Za Brannon PA-C  Pulmonologist: Dorothea Holt  Provider: [facility]  Clinician: Kadi Young [place]  Dx:   Encounter Diagnosis   Name Primary?  Centrilobular emphysema ([address] )      Date of onset: 2019      SUMMARY OF PROGRESS:  Mr Myra Salmon has returned to pulmonary rehab and completed 9 sessions of the pulmonary rehabilitation program  He completes 35 minutes of cardiovascular exercise with a light weight strength training component  His exercise MET level is 2 1  RPE 3-6 RPD 2-6  Completed 680 Feet of 6 minute walk test  Resting oxygenation rate was 96-99% on 4L/min of oxygen via nasal canula and during peak exercise 93-99%  He occasionally complains of a light headed feeling  Short of breath and fatigue with minimal exertion  Complained of left leg discomfort and stated he has fallen at home because of his left leg  He has had face to face visit with pulmonologist  Will continue to monitor  Medication compliance: Yes   Comments: states he is taking his medication as prescribed  Fall Risk: High   Comments: leg fatigue, short of breath and fatigue with minimal exertion    EKG changes: NSR      EXERCISE ASSESSMENT and PLAN    Current Exercise Program in Rehab:       Frequency: 3 days/week        Minutes: 35-40         METS: 2-2 1            HR:    RPE: 3-6         Modalities: Treadmill, UBE, NuStep and Recumbent bike      Exercise Progression 30 Day Goals :    Frequency: 3 days/week   Minutes: 31-45   METS: 2 0-3 0   HR:    RPE: 4-6   Modalities: Treadmill, UBE, NuStep and Recumbent bike    Strength trainin-3 days / week  12-15 repetitions  1-2 sets per modality    Modalities: Lateral Raise, Arm Curl, Upright Rows, Front Raises and Shoulder Shrugs    Progressing:   In Progress    Home Exercise: increase endurance to return to playing [employer] board and return to shopping for clothes    Goals:  increase endurance to return to playing [employer] board and return to shopping for clothes  Education: Benefit of exercise for CAD risk factors, home exercise guidelines, signs and sxs and RPE scale   Plan:increase endurance to continue to walk at home  Readiness to change: Preparation:  (Getting ready to change)       NUTRITION ASSESSMENT AND PLAN    Weight control:    Starting weight: 192 8   Current weight:   200  Waist circumference:    Startin 5   Current:    Diabetes: N/A  Lipid management: Discussed diet and lipid management and Last lipid profile 2019  Chol 167  TRG 66  HDL 40    Goals:no change at this time  Education: heart healthy diet  Progressing:No  Plan: Mr Citlalli Cortez refuse to make dietary changes at this time  Readiness to change: Pre-Contemplation:   (Not yet acknowledging that there is a problem behavior that needs to change)      PSYCHOSOCIAL ASSESSMENT AND PLAN    Emotional:  Depression assessment:  PHQ-9 = 10-14 = Moderate Depression            Anxiety measure:  PAULINA-7 = 0-4  = Not anxious  Self-reported stress level: 5   Social support: Good   Goals:  PHQ-9 - reduced severity by one level, continue medical therapy, improved positive thoughts of well being and increased energy  Education: signs/sxs of depression, benefits of positive support system, coping mechanisms and states he follows up regularly with primary MD in regards to depression  Progressing: In Progress  Plan: Practice relaxation techniques and continue to follow up with MD and take medication as prescribed  Readiness to change: Action:  (Changing behavior)      OTHER CORE COMPONENTS     Tobacco:   Social History     Tobacco Use   Smoking Status Former Smoker    Packs/day: 1 00    Years: 60 00    Pack years: 60 00    Last attempt to quit: 2017    Years since quittin 8 Smokeless Tobacco Never Used   Tobacco Comment    quit in 2017       Tobacco Use Intervention: Referral to tobacco expert:   N/A    Blood pressure:    Restin/72   Exercise: 158/68    Goals: consistent BP < 130/80  Education:  understanding HTN and CAD and low sodium diet and HTN, breathing retraining, how to avoid infections, hand hygiene  Progressing: In Progress  Plan: Class: Understanding Heart Disease and Class: Common Heart Medications  Readiness to change: Action:  (Changing behavior)     Mason Wolff, DO

## 2020-07-15 NOTE — PROGRESS NOTES
Medical Director 30 day Pulmonary Rehabilitation Review    I certify that I have met with Navya Urias face-to face to provide a 30 day progress review of his/her pulmonary rehabilitation program at [address] [place] [address]    I have reviewed the most recent individualized treatment plan (ITP), outcomes assessment, and provided opportunity for discussion with the patient    Continue with current treatment plan yes    Please provide the following modifications to the current treatment plan: N/A      Jorge Tate

## 2020-07-17 ENCOUNTER — CLINICAL SUPPORT (OUTPATIENT)
Dept: PULMONOLOGY | Facility: CLINIC | Age: 75
End: 2020-07-17
Payer: MEDICARE

## 2020-07-17 DIAGNOSIS — J43.2 CENTRILOBULAR EMPHYSEMA (HCC): ICD-10-CM

## 2020-07-17 PROCEDURE — G0424 PULMONARY REHAB W EXER: HCPCS

## 2020-07-20 ENCOUNTER — CLINICAL SUPPORT (OUTPATIENT)
Dept: PULMONOLOGY | Facility: CLINIC | Age: 75
End: 2020-07-20
Payer: MEDICARE

## 2020-07-20 ENCOUNTER — PATIENT OUTREACH (OUTPATIENT)
Dept: CASE MANAGEMENT | Facility: HOSPITAL | Age: 75
End: 2020-07-20

## 2020-07-20 DIAGNOSIS — J43.2 CENTRILOBULAR EMPHYSEMA (HCC): ICD-10-CM

## 2020-07-20 PROCEDURE — G0424 PULMONARY REHAB W EXER: HCPCS

## 2020-07-20 NOTE — PROGRESS NOTES
Patient was contacted to explain the back order of POC's with [facility]  The RT team at [facility] will take care of his request for an Inogen POC as soon as they are back in stock  Paddy Gay had also contacted [facility] and was aware of the back order

## 2020-07-22 ENCOUNTER — CLINICAL SUPPORT (OUTPATIENT)
Dept: PULMONOLOGY | Facility: CLINIC | Age: 75
End: 2020-07-22
Payer: MEDICARE

## 2020-07-22 ENCOUNTER — PATIENT OUTREACH (OUTPATIENT)
Dept: CASE MANAGEMENT | Facility: HOSPITAL | Age: 75
End: 2020-07-22

## 2020-07-22 DIAGNOSIS — J43.2 CENTRILOBULAR EMPHYSEMA (HCC): ICD-10-CM

## 2020-07-22 PROCEDURE — G0424 PULMONARY REHAB W EXER: HCPCS

## 2020-07-22 NOTE — PROGRESS NOTES
Patient contacted RT to ask a question about exposure to additional people now that he is engaged in Pulmonary Rehab and the concern over catching a cold or the virus  Abelardo was reminded to take the advised infection precautions and continue exercising  The Pulmonary Rehab team is also under strict precautions and following all guidelines  Also discussed was the Portable O2 concentrator  Abelardo did get a call from [address] and is scheduled to have the evaluation on 7/28

## 2020-07-24 ENCOUNTER — CLINICAL SUPPORT (OUTPATIENT)
Dept: PULMONOLOGY | Facility: CLINIC | Age: 75
End: 2020-07-24
Payer: MEDICARE

## 2020-07-24 DIAGNOSIS — J43.2 CENTRILOBULAR EMPHYSEMA (HCC): ICD-10-CM

## 2020-07-24 PROCEDURE — G0424 PULMONARY REHAB W EXER: HCPCS

## 2020-07-27 ENCOUNTER — CLINICAL SUPPORT (OUTPATIENT)
Dept: PULMONOLOGY | Facility: CLINIC | Age: 75
End: 2020-07-27
Payer: MEDICARE

## 2020-07-27 DIAGNOSIS — J43.2 CENTRILOBULAR EMPHYSEMA (HCC): ICD-10-CM

## 2020-07-27 PROCEDURE — G0424 PULMONARY REHAB W EXER: HCPCS

## 2020-07-29 ENCOUNTER — CLINICAL SUPPORT (OUTPATIENT)
Dept: PULMONOLOGY | Facility: CLINIC | Age: 75
End: 2020-07-29
Payer: MEDICARE

## 2020-07-29 DIAGNOSIS — J43.2 CENTRILOBULAR EMPHYSEMA (HCC): ICD-10-CM

## 2020-07-29 PROCEDURE — G0424 PULMONARY REHAB W EXER: HCPCS

## 2020-07-31 ENCOUNTER — CLINICAL SUPPORT (OUTPATIENT)
Dept: PULMONOLOGY | Facility: CLINIC | Age: 75
End: 2020-07-31
Payer: MEDICARE

## 2020-07-31 DIAGNOSIS — K21.00 GASTROESOPHAGEAL REFLUX DISEASE WITH ESOPHAGITIS: ICD-10-CM

## 2020-07-31 DIAGNOSIS — J43.2 CENTRILOBULAR EMPHYSEMA (HCC): ICD-10-CM

## 2020-07-31 PROCEDURE — G0424 PULMONARY REHAB W EXER: HCPCS

## 2020-07-31 RX ORDER — METOCLOPRAMIDE 10 MG/1
TABLET ORAL
Qty: 60 TABLET | Refills: 0 | Status: SHIPPED | OUTPATIENT
Start: 2020-07-31 | End: 2020-08-21 | Stop reason: ALTCHOICE

## 2020-08-03 ENCOUNTER — CLINICAL SUPPORT (OUTPATIENT)
Dept: PULMONOLOGY | Facility: CLINIC | Age: 75
End: 2020-08-03
Payer: MEDICARE

## 2020-08-03 DIAGNOSIS — J43.2 CENTRILOBULAR EMPHYSEMA (HCC): ICD-10-CM

## 2020-08-03 PROCEDURE — G0424 PULMONARY REHAB W EXER: HCPCS

## 2020-08-04 DIAGNOSIS — K21.00 GASTROESOPHAGEAL REFLUX DISEASE WITH ESOPHAGITIS: Primary | ICD-10-CM

## 2020-08-04 DIAGNOSIS — F41.9 ANXIETY: ICD-10-CM

## 2020-08-04 DIAGNOSIS — G47.01 INSOMNIA DUE TO MEDICAL CONDITION: ICD-10-CM

## 2020-08-04 RX ORDER — FAMOTIDINE 20 MG/1
20 TABLET, FILM COATED ORAL DAILY
Qty: 30 TABLET | Refills: 3 | Status: SHIPPED | OUTPATIENT
Start: 2020-08-04 | End: 2021-06-08 | Stop reason: ALTCHOICE

## 2020-08-04 NOTE — TELEPHONE ENCOUNTER
Will refill patient's trazadone  It would be nice if we could schedule him for medicare annual wellness visit with me in August or September  Thanks!

## 2020-08-05 ENCOUNTER — CLINICAL SUPPORT (OUTPATIENT)
Dept: PULMONOLOGY | Facility: CLINIC | Age: 75
End: 2020-08-05
Payer: MEDICARE

## 2020-08-05 DIAGNOSIS — J43.2 CENTRILOBULAR EMPHYSEMA (HCC): ICD-10-CM

## 2020-08-05 PROCEDURE — G0424 PULMONARY REHAB W EXER: HCPCS

## 2020-08-05 RX ORDER — FINASTERIDE 5 MG/1
TABLET, FILM COATED ORAL
Qty: 90 TABLET | Refills: 0 | OUTPATIENT
Start: 2020-08-05

## 2020-08-05 RX ORDER — TRAZODONE HYDROCHLORIDE 50 MG/1
TABLET ORAL
Qty: 30 TABLET | Refills: 0 | Status: SHIPPED | OUTPATIENT
Start: 2020-08-05 | End: 2020-09-02

## 2020-08-07 ENCOUNTER — CLINICAL SUPPORT (OUTPATIENT)
Dept: PULMONOLOGY | Facility: CLINIC | Age: 75
End: 2020-08-07
Payer: MEDICARE

## 2020-08-07 DIAGNOSIS — J43.2 CENTRILOBULAR EMPHYSEMA (HCC): ICD-10-CM

## 2020-08-07 PROCEDURE — G0424 PULMONARY REHAB W EXER: HCPCS

## 2020-08-10 ENCOUNTER — CLINICAL SUPPORT (OUTPATIENT)
Dept: PULMONOLOGY | Facility: CLINIC | Age: 75
End: 2020-08-10
Payer: MEDICARE

## 2020-08-10 ENCOUNTER — OFFICE VISIT (OUTPATIENT)
Dept: FAMILY MEDICINE CLINIC | Facility: CLINIC | Age: 75
End: 2020-08-10
Payer: MEDICARE

## 2020-08-10 VITALS
HEIGHT: 77 IN | BODY MASS INDEX: 23.38 KG/M2 | SYSTOLIC BLOOD PRESSURE: 120 MMHG | WEIGHT: 198 LBS | OXYGEN SATURATION: 94 % | TEMPERATURE: 99.8 F | HEART RATE: 97 BPM | RESPIRATION RATE: 22 BRPM | DIASTOLIC BLOOD PRESSURE: 90 MMHG

## 2020-08-10 DIAGNOSIS — R19.7 DIARRHEA, UNSPECIFIED TYPE: ICD-10-CM

## 2020-08-10 DIAGNOSIS — E88.01 AAT (ALPHA-1-ANTITRYPSIN) DEFICIENCY (HCC): Primary | ICD-10-CM

## 2020-08-10 DIAGNOSIS — J43.2 CENTRILOBULAR EMPHYSEMA (HCC): ICD-10-CM

## 2020-08-10 DIAGNOSIS — F41.9 ANXIETY: ICD-10-CM

## 2020-08-10 PROCEDURE — G0424 PULMONARY REHAB W EXER: HCPCS

## 2020-08-10 PROCEDURE — 99213 OFFICE O/P EST LOW 20 MIN: CPT | Performed by: FAMILY MEDICINE

## 2020-08-10 RX ORDER — CHOLESTYRAMINE LIGHT 4 G/5.7G
4 POWDER, FOR SUSPENSION ORAL 2 TIMES DAILY
Qty: 60 PACKET | Refills: 1 | Status: SHIPPED | OUTPATIENT
Start: 2020-08-10 | End: 2021-02-09 | Stop reason: SDUPTHER

## 2020-08-10 NOTE — PROGRESS NOTES
Pulmonary Rehabilitation Plan of Care   90 day       Today's date: 8/10/2020   Visits: 19  Patient name: Javi Betts      : 1945  Age: 67 y o  MRN: [id number]  Referring Physician: Yahir Francis PA-C  Pulmonologist: Zenon Herman  Provider: [facility]  Clinician: Yuval Mantilla RN  Dx:   Encounter Diagnosis   Name Primary?  Centrilobular emphysema ([address] )      Date of onset: 2019      SUMMARY OF PROGRESS:  Mr Gualberto Yang has returned to pulmonary rehab and completed 19 sessions of the pulmonary rehabilitation program  He completes 28- 35 minutes of cardiovascular exercise with a light weight strength training component  His exercise MET level is 1 5-2 1  RPE 3-6 RPD 2-6  Completed 680 Feet of 6 minute walk test  Resting oxygenation rate was 94-99% on 4L/min of oxygen via nasal canula and during peak exercise 93-99%  He occasionally complains of a light headed feeling  Short of breath and fatigue with minimal exertion  Complained of left leg discomfort and stated he has fallen at home because of his left leg  He has had face to face visit with pulmonologist  Will continue to monitor  Medication compliance: Yes   Comments: states he is taking his medication as prescribed  Fall Risk: High   Comments: leg fatigue, short of breath and fatigue with minimal exertion    EKG changes: NSR      EXERCISE ASSESSMENT and PLAN    Current Exercise Program in Rehab:       Frequency: 3 days/week        Minutes: 35-40         METS: 2-2 1            HR:    RPE: 3-6         Modalities: Treadmill, UBE, NuStep and Recumbent bike      Exercise Progression 30 Day Goals :    Frequency: 3 days/week   Minutes: 31-45   METS: 2 0-3 0   HR:    RPE: 4-6   Modalities: Treadmill, UBE, NuStep and Recumbent bike    Strength trainin-3 days / week  12-15 repetitions  1-2 sets per modality    Modalities: Lateral Raise, Arm Curl, Upright Rows, Front Raises and Shoulder Shrugs    Progressing:   In Progress    Home Exercise: increase endurance to return to playing [employer], [employer] board and return to shopping for clothes    Goals:  increase endurance to return to playing [employer], shuffle board and return to shopping for clothes  Education: Benefit of exercise for CAD risk factors, home exercise guidelines, signs and sxs and RPE scale   Plan:increase endurance to continue to walk at home  Readiness to change: Preparation:  (Getting ready to change)       NUTRITION ASSESSMENT AND PLAN    Weight control:    Starting weight: 192 8   Current weight:   200  Waist circumference:    Startin 5   Current:    Diabetes: N/A  Lipid management: Discussed diet and lipid management and Last lipid profile 2019  Chol 167  TRG 66  HDL 40    Goals:no change at this time  Education: heart healthy diet, how to read food labels  Progressing:No  Plan: Mr Makayla Patel refuse to make dietary changes at this time  Readiness to change: Pre-Contemplation:   (Not yet acknowledging that there is a problem behavior that needs to change)      PSYCHOSOCIAL ASSESSMENT AND PLAN    Emotional:  Depression assessment:  PHQ-9 = 10-14 = Moderate Depression            Anxiety measure:  PAULINA-7 = 0-4  = Not anxious  Self-reported stress level: 5   Social support: Good   Goals:  PHQ-9 - reduced severity by one level, continue medical therapy, improved positive thoughts of well being and increased energy  Education: signs/sxs of depression, benefits of positive support system, coping mechanisms and states he follows up regularly with primary MD in regards to depression, stress and relaxation tips, [place] information  Progressing: In Progress  Plan: Practice relaxation techniques and continue to follow up with MD and take medication as prescribed  Readiness to change: Action:  (Changing behavior)      OTHER CORE COMPONENTS     Tobacco:   Social History     Tobacco Use   Smoking Status Former Smoker    Packs/day: 1 00    Years: 60 00  Pack years: 60 00    Last attempt to quit: 2017    Years since quittin 9   Smokeless Tobacco Never Used   Tobacco Comment    quit in 2017       Tobacco Use Intervention: Referral to tobacco expert:   N/A    Blood pressure:    Restin/68   Exercise: 148/70    Goals: consistent BP < 130/80  Education:  understanding HTN and CAD and low sodium diet and HTN, breathing retraining, how to avoid infections, hand hygiene  Progressing: In Progress  Plan: Class: Understanding Heart Disease and Class: Common Heart Medications  Readiness to change: Action:  (Changing behavior)     Alonso Mohr, DO

## 2020-08-12 ENCOUNTER — CLINICAL SUPPORT (OUTPATIENT)
Dept: PULMONOLOGY | Facility: CLINIC | Age: 75
End: 2020-08-12
Payer: MEDICARE

## 2020-08-12 DIAGNOSIS — J44.9 CHRONIC OBSTRUCTIVE PULMONARY DISEASE, UNSPECIFIED COPD TYPE (HCC): ICD-10-CM

## 2020-08-12 PROCEDURE — G0424 PULMONARY REHAB W EXER: HCPCS

## 2020-08-12 RX ORDER — LORAZEPAM 1 MG/1
TABLET ORAL
Qty: 60 TABLET | Refills: 0 | OUTPATIENT
Start: 2020-08-12

## 2020-08-12 RX ORDER — LORAZEPAM 1 MG/1
1 TABLET ORAL 2 TIMES DAILY
Qty: 60 TABLET | Refills: 0 | Status: SHIPPED | OUTPATIENT
Start: 2020-08-12 | End: 2020-09-09

## 2020-08-12 NOTE — PROGRESS NOTES
Medical Director 30 day Pulmonary Rehabilitation Review    I certify that I have met with Carleen Esquivel face-to face to provide a 30 day progress review of his/her pulmonary rehabilitation program at [address] [place] [address]    I have reviewed the most recent individualized treatment plan (ITP), outcomes assessment, and provided opportunity for discussion with the patient      Continue with current treatment plan yes    Please provide the following modifications to the current treatment plan: increase time and resistance as tolerated     LEANDER Fenton

## 2020-08-14 ENCOUNTER — CLINICAL SUPPORT (OUTPATIENT)
Dept: PULMONOLOGY | Facility: CLINIC | Age: 75
End: 2020-08-14
Payer: MEDICARE

## 2020-08-14 DIAGNOSIS — J43.2 CENTRILOBULAR EMPHYSEMA (HCC): ICD-10-CM

## 2020-08-14 PROCEDURE — G0424 PULMONARY REHAB W EXER: HCPCS

## 2020-08-17 ENCOUNTER — CLINICAL SUPPORT (OUTPATIENT)
Dept: PULMONOLOGY | Facility: CLINIC | Age: 75
End: 2020-08-17
Payer: MEDICARE

## 2020-08-17 DIAGNOSIS — J43.2 CENTRILOBULAR EMPHYSEMA (HCC): ICD-10-CM

## 2020-08-17 PROCEDURE — G0424 PULMONARY REHAB W EXER: HCPCS

## 2020-08-19 ENCOUNTER — HOSPITAL ENCOUNTER (EMERGENCY)
Facility: HOSPITAL | Age: 75
Discharge: HOME | End: 2020-08-19
Attending: EMERGENCY MEDICINE | Admitting: EMERGENCY MEDICINE
Payer: MEDICARE

## 2020-08-19 ENCOUNTER — APPOINTMENT (EMERGENCY)
Dept: RADIOLOGY | Facility: HOSPITAL | Age: 75
End: 2020-08-19
Payer: MEDICARE

## 2020-08-19 VITALS
BODY MASS INDEX: 22.89 KG/M2 | TEMPERATURE: 98 F | RESPIRATION RATE: 22 BRPM | SYSTOLIC BLOOD PRESSURE: 185 MMHG | OXYGEN SATURATION: 99 % | HEART RATE: 70 BPM | WEIGHT: 193 LBS | DIASTOLIC BLOOD PRESSURE: 88 MMHG

## 2020-08-19 DIAGNOSIS — J43.9 BULLA OF LUNG (HCC): ICD-10-CM

## 2020-08-19 DIAGNOSIS — J44.1 COPD EXACERBATION (HCC): Primary | ICD-10-CM

## 2020-08-19 DIAGNOSIS — M87.051 AVASCULAR NECROSIS OF HIP, RIGHT (HCC): ICD-10-CM

## 2020-08-19 LAB
ALBUMIN SERPL BCP-MCNC: 3.6 G/DL (ref 3.5–5)
ALP SERPL-CCNC: 73 U/L (ref 46–116)
ALT SERPL W P-5'-P-CCNC: 24 U/L (ref 12–78)
ANION GAP SERPL CALCULATED.3IONS-SCNC: 7 MMOL/L (ref 4–13)
APTT PPP: 29 SECONDS (ref 23–37)
AST SERPL W P-5'-P-CCNC: 15 U/L (ref 5–45)
BASOPHILS # BLD AUTO: 0.06 THOUSANDS/ΜL (ref 0–0.1)
BASOPHILS NFR BLD AUTO: 1 % (ref 0–1)
BILIRUB SERPL-MCNC: 0.9 MG/DL (ref 0.2–1)
BUN SERPL-MCNC: 12 MG/DL (ref 5–25)
CALCIUM SERPL-MCNC: 8.9 MG/DL (ref 8.3–10.1)
CHLORIDE SERPL-SCNC: 103 MMOL/L (ref 100–108)
CO2 SERPL-SCNC: 28 MMOL/L (ref 21–32)
CREAT SERPL-MCNC: 1.3 MG/DL (ref 0.6–1.3)
EOSINOPHIL # BLD AUTO: 0.28 THOUSAND/ΜL (ref 0–0.61)
EOSINOPHIL NFR BLD AUTO: 3 % (ref 0–6)
ERYTHROCYTE [DISTWIDTH] IN BLOOD BY AUTOMATED COUNT: 13.1 % (ref 11.6–15.1)
GFR SERPL CREATININE-BSD FRML MDRD: 53 ML/MIN/1.73SQ M
GLUCOSE SERPL-MCNC: 115 MG/DL (ref 65–140)
HCT VFR BLD AUTO: 39 % (ref 36.5–49.3)
HGB BLD-MCNC: 13.1 G/DL (ref 12–17)
INR PPP: 1.08 (ref 0.84–1.19)
LIPASE SERPL-CCNC: 132 U/L (ref 73–393)
LYMPHOCYTES # BLD AUTO: 1.58 THOUSANDS/ΜL (ref 0.6–4.47)
LYMPHOCYTES NFR BLD AUTO: 19 % (ref 14–44)
MAGNESIUM SERPL-MCNC: 2 MG/DL (ref 1.6–2.6)
MCH RBC QN AUTO: 32.4 PG (ref 26.8–34.3)
MCHC RBC AUTO-ENTMCNC: 33.6 G/DL (ref 31.4–37.4)
MCV RBC AUTO: 97 FL (ref 82–98)
MONOCYTES # BLD AUTO: 0.58 THOUSAND/ΜL (ref 0.17–1.22)
MONOCYTES NFR BLD AUTO: 7 % (ref 4–12)
NEUTROPHILS # BLD AUTO: 6.05 THOUSANDS/ΜL (ref 1.85–7.62)
NEUTS SEG NFR BLD AUTO: 70 % (ref 43–75)
NT-PROBNP SERPL-MCNC: 257 PG/ML
PLATELET # BLD AUTO: 229 THOUSANDS/UL (ref 149–390)
PMV BLD AUTO: 8.6 FL (ref 8.9–12.7)
POTASSIUM SERPL-SCNC: 3.9 MMOL/L (ref 3.5–5.3)
PROT SERPL-MCNC: 6.9 G/DL (ref 6.4–8.2)
PROTHROMBIN TIME: 13.9 SECONDS (ref 11.6–14.5)
RBC # BLD AUTO: 4.04 MILLION/UL (ref 3.88–5.62)
SODIUM SERPL-SCNC: 138 MMOL/L (ref 136–145)
TROPONIN I SERPL-MCNC: <0.02 NG/ML
WBC # BLD AUTO: 8.55 THOUSAND/UL (ref 4.31–10.16)

## 2020-08-19 PROCEDURE — 96374 THER/PROPH/DIAG INJ IV PUSH: CPT

## 2020-08-19 PROCEDURE — 85025 COMPLETE CBC W/AUTO DIFF WBC: CPT | Performed by: PHYSICIAN ASSISTANT

## 2020-08-19 PROCEDURE — 83880 ASSAY OF NATRIURETIC PEPTIDE: CPT | Performed by: PHYSICIAN ASSISTANT

## 2020-08-19 PROCEDURE — 85730 THROMBOPLASTIN TIME PARTIAL: CPT | Performed by: PHYSICIAN ASSISTANT

## 2020-08-19 PROCEDURE — 80053 COMPREHEN METABOLIC PANEL: CPT | Performed by: PHYSICIAN ASSISTANT

## 2020-08-19 PROCEDURE — 99285 EMERGENCY DEPT VISIT HI MDM: CPT

## 2020-08-19 PROCEDURE — 83735 ASSAY OF MAGNESIUM: CPT | Performed by: PHYSICIAN ASSISTANT

## 2020-08-19 PROCEDURE — 85610 PROTHROMBIN TIME: CPT | Performed by: PHYSICIAN ASSISTANT

## 2020-08-19 PROCEDURE — 84484 ASSAY OF TROPONIN QUANT: CPT | Performed by: PHYSICIAN ASSISTANT

## 2020-08-19 PROCEDURE — 96361 HYDRATE IV INFUSION ADD-ON: CPT

## 2020-08-19 PROCEDURE — 36415 COLL VENOUS BLD VENIPUNCTURE: CPT | Performed by: PHYSICIAN ASSISTANT

## 2020-08-19 PROCEDURE — 99285 EMERGENCY DEPT VISIT HI MDM: CPT | Performed by: PHYSICIAN ASSISTANT

## 2020-08-19 PROCEDURE — G1004 CDSM NDSC: HCPCS

## 2020-08-19 PROCEDURE — 93005 ELECTROCARDIOGRAM TRACING: CPT

## 2020-08-19 PROCEDURE — 74177 CT ABD & PELVIS W/CONTRAST: CPT

## 2020-08-19 PROCEDURE — 71275 CT ANGIOGRAPHY CHEST: CPT

## 2020-08-19 PROCEDURE — 83690 ASSAY OF LIPASE: CPT | Performed by: PHYSICIAN ASSISTANT

## 2020-08-19 RX ORDER — METHYLPREDNISOLONE SODIUM SUCCINATE 125 MG/2ML
60 INJECTION, POWDER, LYOPHILIZED, FOR SOLUTION INTRAMUSCULAR; INTRAVENOUS ONCE
Status: COMPLETED | OUTPATIENT
Start: 2020-08-19 | End: 2020-08-19

## 2020-08-19 RX ORDER — IPRATROPIUM BROMIDE AND ALBUTEROL SULFATE 2.5; .5 MG/3ML; MG/3ML
3 SOLUTION RESPIRATORY (INHALATION) ONCE
Status: COMPLETED | OUTPATIENT
Start: 2020-08-19 | End: 2020-08-19

## 2020-08-19 RX ORDER — AZITHROMYCIN 250 MG/1
TABLET, FILM COATED ORAL
Qty: 6 TABLET | Refills: 0 | Status: SHIPPED | OUTPATIENT
Start: 2020-08-19 | End: 2020-08-23

## 2020-08-19 RX ORDER — BENZONATATE 100 MG/1
100 CAPSULE ORAL 3 TIMES DAILY PRN
Qty: 20 CAPSULE | Refills: 0 | Status: SHIPPED | OUTPATIENT
Start: 2020-08-19 | End: 2020-09-13

## 2020-08-19 RX ORDER — PREDNISONE 20 MG/1
40 TABLET ORAL DAILY
Qty: 8 TABLET | Refills: 0 | Status: SHIPPED | OUTPATIENT
Start: 2020-08-19 | End: 2020-08-23

## 2020-08-19 RX ADMIN — METHYLPREDNISOLONE SODIUM SUCCINATE 60 MG: 125 INJECTION, POWDER, FOR SOLUTION INTRAMUSCULAR; INTRAVENOUS at 13:43

## 2020-08-19 RX ADMIN — IPRATROPIUM BROMIDE AND ALBUTEROL SULFATE 3 ML: 2.5; .5 SOLUTION RESPIRATORY (INHALATION) at 13:33

## 2020-08-19 RX ADMIN — SODIUM CHLORIDE 500 ML: 0.9 INJECTION, SOLUTION INTRAVENOUS at 13:45

## 2020-08-19 RX ADMIN — IOHEXOL 100 ML: 350 INJECTION, SOLUTION INTRAVENOUS at 14:34

## 2020-08-19 RX ADMIN — IPRATROPIUM BROMIDE AND ALBUTEROL SULFATE 3 ML: 2.5; .5 SOLUTION RESPIRATORY (INHALATION) at 13:32

## 2020-08-19 NOTE — ED NOTES
Pt aware of need for urine sample  Pt verbalizes understanding        Renetta Daily RN  08/19/20 [id number]

## 2020-08-19 NOTE — ED PROVIDER NOTES
History  Chief Complaint   Patient presents with    Weakness - Generalized     c/o generalized weakness, increase diarrhea, decrease appetite     66 y/o male, h/o severe emphysema on 3 L NC/IBS/BPH/GERD/PE/Basal cell CA, presenting today with generalized weakness has gradually worsened over the past 3 weeks  States he has been feeling generally fatigued and has had decreased appetite over this time as well with intermittent nausea  States that whenever he eats he becomes nauseous and has had decreased food and fluid intake accompanied with epigastric discomfort  Has also had worsening lower back pain that is nonradiating without any falls or injuries  States he chronically has diarrhea however feels as though has slightly worsened  He intermittently will have watery diarrhea and then some formed stools without any particular malodorous changes and w/o discoloration  Has had acute on chronic worsening shortness of breath accompanied with a worsening chronic cough that is now mildly productive occasionally with dark mucous  Did not take his nebulizer treatments today  States he has lost weight over the past few weeks however states he has also had to buy larger pants  Has had night sweats  Denies vomiting, abdominal pain, chest pain, numbness, paresthesias, unilateral weakness, rash, fevers, hematochezia, changes in urination  Of note, patient had a CTA PE study on 5/5/2020 showing a left lower lobe nodule that is spiculated, patient has not had any follow-up regarding this nodule such as the biopsy etcetera  Patient does have a known chronic left lower lobe PE as well, he is not on any blood thinners  Patient had a colonoscopy on 1/29/2020, some limitation due to poor prep however otherwise generally unremarkable colonoscopy    He had an EGD performed on 10/14/2019 showing GERD with esophagitis etc    Echo on 7/1/2019 showed an EF of 60%          Prior to Admission Medications   Prescriptions Last Dose Informant Patient Reported? Taking? LORazepam (Ativan) 1 mg tablet 2020 at Unknown time  No Yes   Sig: Take 1 tablet (1 mg total) by mouth 2 (two) times a day   OXYGEN-HELIUM IN  Self Yes No   Sig: Inhale 2L at rest- 3L with activity   VENTOLIN  (90 Base) MCG/ACT inhaler  Self Yes Yes   ZEMAIRA infusion  Self Yes Yes   amLODIPine (NORVASC) 10 mg tablet 2020 at Unknown time  No Yes   Sig: Take 1 tablet (10 mg total) by mouth daily   budesonide (PULMICORT) 0 5 mg/2 mL nebulizer solution 2020 at Unknown time Self No Yes   Sig: Take 1 vial (0 5 mg total) by nebulization 2 (two) times a day for 5 days Rinse mouth after use     busPIRone (BUSPAR) 10 mg tablet 2020 at Unknown time  No Yes   Sig: TAKE ONE TABLET BY MOUTH THREE TIMES DAILY    cholestyramine sugar free (QUESTRAN LIGHT) 4 g packet 2020 at Unknown time  No Yes   Sig: Take 1 packet (4 g total) by mouth 2 (two) times a day   famotidine (PEPCID) 20 mg tablet 2020 at Unknown time  No Yes   Sig: Take 1 tablet (20 mg total) by mouth daily   finasteride (PROSCAR) 5 mg tablet 2020 at Unknown time Self Yes Yes   Sig: Take 5 mg by mouth every morning     fluticasone (FLONASE) 50 mcg/act nasal spray 2020 at Unknown time  No Yes   Si sprays into each nostril daily   formoterol (PERFOROMIST) 20 MCG/2ML nebulizer solution 2020 at Unknown time Self Yes Yes   Sig: Take 20 mcg by nebulization 2 (two) times a day   gabapentin (NEURONTIN) 300 mg capsule 2020 at Unknown time Self No Yes   Sig: Take 1 capsule (300 mg total) by mouth 3 (three) times a day   ipratropium-albuterol (DUO-NEB) 0 5-2 5 mg/3 mL nebulizer solution 2020 at Unknown time Self No Yes   Sig: Take 1 vial (3 mL total) by nebulization 4 (four) times a day   metoclopramide (REGLAN) 10 mg tablet 2020 at Unknown time  No Yes   Sig: TAKE ONE TABLET BY MOUTH TWICE DAILY BEFORE MEALS    ondansetron (ZOFRAN) 4 mg tablet Unknown at Unknown time Self No No   Sig: Take 1 tablet (4 mg total) by mouth every 8 (eight) hours as needed for nausea or vomiting   pantoprazole (PROTONIX) 40 mg tablet  Self No No   Sig: Take 1 tablet (40 mg total) by mouth 2 (two) times a day   tamsulosin (FLOMAX) 0 4 mg   No Yes   Sig: Half a tablet daily   traZODone (DESYREL) 50 mg tablet   No Yes   Sig: TAKE ONE TABLET BY MOUTH NIGHTLY AT BEDTIME    triamterene-hydrochlorothiazide (DYAZIDE) 37 5-25 mg per capsule   No Yes   Sig: TAKE ONE CAPSULE BY MOUTH DAILY AS NEEDED for leg swelling      Facility-Administered Medications: None       Past Medical History:   Diagnosis Date    Anesthesia complication     Difficult to wake up    Arthritis     BPH (benign prostatic hyperplasia)     urinary frequency    Cancer (HCC)     basal cell neck, face    COPD exacerbation ([facility] ) 12/29/2019    Full dentures     Hiatal hernia     History of methicillin resistant staphylococcus aureus (MRSA)     10/11/2018 MRSA (nares) positive    Irritable bowel syndrome     Kidney stone     at least 7 episodes    Liver disease     Alpha 1- enzyme deficiency - diagnosed 2002  has been on weekly replacement therapy since then    [facility]     1/25/15  FEV1 - 2 45 liters or 59% of predicted    RSV infection 12/2017    Wears glasses     for driving only       Past Surgical History:   Procedure Laterality Date    BACK SURGERY  2008    discectomy    COLONOSCOPY      COLONOSCOPY N/A 3/10/2017    Procedure: Kalyan Snyder;  Surgeon: Mercedes Meza MD;  Location: [facility] GI LAB; Service:    Suhail Comer      removal of kidney stones    ESOPHAGOGASTRODUODENOSCOPY N/A 3/10/2017    Procedure: ESOPHAGOGASTRODUODENOSCOPY (EGD); Surgeon: Mercedes Meza MD;  Location: [facility] GI LAB; Service:     LITHOTRIPSY      TX ESOPHAGOGASTRODUODENOSCOPY TRANSORAL DIAGNOSTIC N/A 11/20/2018    Procedure: ESOPHAGOGASTRODUODENOSCOPY (EGD); Surgeon: Mercedes Meza MD;  Location: [facility] GI LAB;   Service: Gastroenterology    TONSILLECTOMY      VEIN LIGATION AND STRIPPING Bilateral     17's       Family History   Problem Relation Age of Onset    Emphysema Mother         never smoked    Emphysema Father     Cancer Brother         colon    Colon cancer Brother     Ulcerative colitis Family     Liver disease Family      I have reviewed and agree with the history as documented  E-Cigarette/Vaping    E-Cigarette Use Never User      E-Cigarette/Vaping Substances     Social History     Tobacco Use    Smoking status: Former Smoker     Packs/day: 1 00     Years: 60 00     Pack years: 60 00     Last attempt to quit: 2017     Years since quittin 9    Smokeless tobacco: Never Used    Tobacco comment: quit in 2017   Substance Use Topics    Alcohol use: Not Currently     Frequency: Never     Comment: stopped in     Drug use: No       Review of Systems   Constitutional: Positive for appetite change, fatigue and unexpected weight change  Negative for activity change, chills, diaphoresis and fever  HENT: Negative  Eyes: Negative  Respiratory: Positive for cough and shortness of breath  Negative for apnea, choking, chest tightness, wheezing and stridor  Cardiovascular: Negative  Negative for chest pain, palpitations and leg swelling  Gastrointestinal: Positive for abdominal pain, diarrhea and nausea  Negative for abdominal distention, anal bleeding, blood in stool, constipation, rectal pain and vomiting  Genitourinary: Negative  Musculoskeletal: Negative  Skin: Negative  Neurological: Positive for weakness  Negative for dizziness, tremors, seizures, syncope, facial asymmetry, speech difficulty, light-headedness, numbness and headaches  Physical Exam  Physical Exam  Vitals signs and nursing note reviewed  Constitutional:       Appearance: Normal appearance  He is normal weight  HENT:      Head: Normocephalic and atraumatic        Nose: Nose normal    Eyes: Conjunctiva/sclera: Conjunctivae normal       Pupils: Pupils are equal, round, and reactive to light  Neck:      Musculoskeletal: Normal range of motion and neck supple  Cardiovascular:      Rate and Rhythm: Normal rate and regular rhythm  Pulses: Normal pulses  Pulmonary:      Effort: Respiratory distress present  Breath sounds: Normal breath sounds  No stridor  No wheezing, rhonchi or rales  Comments: S PO2 is 97% on 3 L nasal cannula  Patient is mildly tachypneic however continues to speak full sentences  Patient has diminished breath sounds throughout all lung fields  Chest:      Chest wall: No tenderness  Abdominal:      General: Abdomen is flat  Bowel sounds are decreased  There is no distension  Palpations: Abdomen is soft  There is no mass  Tenderness: There is abdominal tenderness in the epigastric area  There is no right CVA tenderness, left CVA tenderness, guarding or rebound  Hernia: No hernia is present  Musculoskeletal:        Arms:         Legs:    Skin:     General: Skin is warm  Capillary Refill: Capillary refill takes less than 2 seconds  Neurological:      Mental Status: He is alert           Vital Signs  ED Triage Vitals [08/19/20 1256]   Temperature Pulse Respirations Blood Pressure SpO2   98 3 °F (36 8 °C) 88 18 (!) 173/91 97 %      Temp Source Heart Rate Source Patient Position - Orthostatic VS BP Location FiO2 (%)   Tympanic Monitor Sitting Left arm --      Pain Score       No Pain           Vitals:    08/19/20 1256   BP: (!) 173/91   Pulse: 88   Patient Position - Orthostatic VS: Sitting         Visual Acuity      ED Medications  Medications   sodium chloride 0 9 % bolus 500 mL (500 mL Intravenous New Bag 8/19/20 1345)   ipratropium-albuterol (DUO-NEB) 0 5-2 5 mg/3 mL inhalation solution 3 mL (3 mL Nebulization Given 8/19/20 1332)   ipratropium-albuterol (DUO-NEB) 0 5-2 5 mg/3 mL inhalation solution 3 mL (3 mL Nebulization Given 8/19/20 1333)   methylPREDNISolone sodium succinate (Solu-MEDROL) injection 60 mg (60 mg Intravenous Given 8/19/20 1343)   iohexol (OMNIPAQUE) 350 MG/ML injection (MULTI-DOSE) 100 mL (100 mL Intravenous Given 8/19/20 1434)       Diagnostic Studies  Results Reviewed     Procedure Component Value Units Date/Time    Lipase [[id number]]  (Normal) Collected:  08/19/20 1341    Lab Status:  Final result Specimen:  Blood from Arm, Right Updated:  08/19/20 1419     Lipase 132 u/L     Magnesium [[id number]]  (Normal) Collected:  08/19/20 1341    Lab Status:  Final result Specimen:  Blood from Arm, Right Updated:  08/19/20 1419     Magnesium 2 0 mg/dL     NT-BNP PRO [[id number]]  (Normal) Collected:  08/19/20 1341    Lab Status:  Final result Specimen:  Blood from Arm, Right Updated:  08/19/20 1419     NT-proBNP 257 pg/mL     Comprehensive metabolic panel [[id number]] Collected:  08/19/20 1341    Lab Status:  Final result Specimen:  Blood from Arm, Right Updated:  08/19/20 1418     Sodium 138 mmol/L      Potassium 3 9 mmol/L      Chloride 103 mmol/L      CO2 28 mmol/L      ANION GAP 7 mmol/L      BUN 12 mg/dL      Creatinine 1 30 mg/dL      Glucose 115 mg/dL      Calcium 8 9 mg/dL      AST 15 U/L      ALT 24 U/L      Alkaline Phosphatase 73 U/L      Total Protein 6 9 g/dL      Albumin 3 6 g/dL      Total Bilirubin 0 90 mg/dL      eGFR 53 ml/min/1 73sq m     Narrative:       [place] guidelines for Chronic Kidney Disease (CKD):     Stage 1 with normal or high GFR (GFR > 90 mL/min/1 73 square meters)    Stage 2 Mild CKD (GFR = 60-89 mL/min/1 73 square meters)    Stage 3A Moderate CKD (GFR = 45-59 mL/min/1 73 square meters)    Stage 3B Moderate CKD (GFR = 30-44 mL/min/1 73 square meters)    Stage 4 Severe CKD (GFR = 15-29 mL/min/1 73 square meters)    Stage 5 End Stage CKD (GFR <15 mL/min/1 73 square meters)  Note: GFR calculation is accurate only with a steady state creatinine    Troponin I [[id number]] (Normal) Collected:  08/19/20 1341    Lab Status:  Final result Specimen:  Blood from Arm, Right Updated:  08/19/20 1414     Troponin I <0 02 ng/mL     Protime-INR [[id number]]  (Normal) Collected:  08/19/20 1341    Lab Status:  Final result Specimen:  Blood from Arm, Right Updated:  08/19/20 1407     Protime 13 9 seconds      INR 1 08    APTT [[id number]]  (Normal) Collected:  08/19/20 1341    Lab Status:  Final result Specimen:  Blood from Arm, Right Updated:  08/19/20 1407     PTT 29 seconds     CBC and differential [[id number]]  (Abnormal) Collected:  08/19/20 1341    Lab Status:  Final result Specimen:  Blood from Arm, Right Updated:  08/19/20 1355     WBC 8 55 Thousand/uL      RBC 4 04 Million/uL      Hemoglobin 13 1 g/dL      Hematocrit 39 0 %      MCV 97 fL      MCH 32 4 pg      MCHC 33 6 g/dL      RDW 13 1 %      MPV 8 6 fL      Platelets 265 Thousands/uL      Neutrophils Relative 70 %      Lymphocytes Relative 19 %      Monocytes Relative 7 %      Eosinophils Relative 3 %      Basophils Relative 1 %      Neutrophils Absolute 6 05 Thousands/µL      Lymphocytes Absolute 1 58 Thousands/µL      Monocytes Absolute 0 58 Thousand/µL      Eosinophils Absolute 0 28 Thousand/µL      Basophils Absolute 0 06 Thousands/µL     UA w Reflex to Microscopic w Reflex to Culture [[id number]]     Lab Status:  No result Specimen:  Urine                  PE Study with CT abdomen & pelvis with contrast   Final Result by Karissa Travis DO (08/19 7468)      1  No acute pulmonary emboli  Stable appearance of chronic left lower lobe pulmonary embolus  2   New micronodules in the left upper lobe suggesting infectious or inflammatory bronchiolitis  3   COPD  1 cm spiculated left lower lobe nodule seen on previous study is not as well visualized currently, possibly related to respiratory motion  No significant enlargement within limitations  Continued CT surveillance in 6 months advised        4  No acute intra-abdominal abnormality  Numerous incidental findings as detailed above  Workstation performed: [id number]         CT recon only thoracolumbar   Final Result by Marshal Fritz DO (08/19 1547)      Mild exaggeration of thoracic kyphosis and mild focal upper thoracic levoscoliosis  No acute osseous abnormality  No thoracic canal stenosis or foraminal narrowing  Mild upper lumbar degenerative change at the L1-L2 level  Please note the entire lumbar spine was not reconstructed for this examination  See separately dictated CT of the chest, abdomen and pelvis from which this study was reconstructed  Workstation performed: [id number]                    Procedures  ECG 12 Lead Documentation Only    Date/Time: 8/19/2020 1:36 PM  Performed by: Melani Pena PA-C  Authorized by: Melani Pena PA-C     Indications / Diagnosis:  SOB, weakness   ECG reviewed by me, the ED Provider: yes    Patient location:  ED  Interpretation:     Interpretation: normal    Rate:     ECG rate:  77    ECG rate assessment: normal    Rhythm:     Rhythm: sinus rhythm    Ectopy:     Ectopy: none    QRS:     QRS axis:  Normal    QRS intervals:  Normal  Conduction:     Conduction: normal    ST segments:     ST segments:  Normal  T waves:     T waves: normal               ED Course  ED Course as of Aug 19 1615   Wed Aug 19, 2020   1543 Updated patient, waiting imaging results, patient appears more comfortable no respiratory distress          US AUDIT      Most Recent Value   Initial Alcohol Screen: US AUDIT-C    1  How often do you have a drink containing alcohol?  0 Filed at: 08/19/2020 1257   Audit-C Score  0 Filed at: 08/19/2020 1257                  DIANE/DAST-10      Most Recent Value   How many times in the past year have you    Used an illegal drug or used a prescription medication for non-medical reasons?   Never Filed at: 08/19/2020 1257                                MDM  Number of Diagnoses or Management Options  Avascular necrosis of hip, right ([facility] ):   Bulla of lung ([facility] ):   COPD exacerbation [facility]):   Diagnosis management comments: Patient feeling much better after IV fluids, steroids and breathing treatments  Discussed labwork and imaging studies with the patient  Discussed with patient his LLL spiculated nodule and he relays that they did not want to biopsy as he is high risk  There is a bulla noted on CT as well  Right sided hip AVN, currently has no hip pain however has had back pain which may be referred  Patient understands the importance of following up with PCP regarding weight loss etc  Discussed with daughter  Will treat for acute on chronic COPD exacerbation  Patient is informed to return to the emergency department for worsening of symptoms and was given proper education regarding their diagnosis and symptoms  Otherwise the patient is informed to follow up with their primary care doctor for re-evaluation of his CT and symptoms especially the weightloss  The patient verbalizes understanding and agrees with above assessment and plan  All questions were answered  Please Note: Fluency Direct voice recognition software may have been used in the creation of this document  Wrong words or sound a like substitutions may have occurred due to the inherent limitations of the voice software             Amount and/or Complexity of Data Reviewed  Clinical lab tests: ordered and reviewed  Tests in the radiology section of CPT®: ordered and reviewed  Review and summarize past medical records: yes  Discuss the patient with other providers: yes (Dr Silvia Ta)  Independent visualization of images, tracings, or specimens: yes          Disposition  Final diagnoses:   COPD exacerbation ([facility] )   Bulla of lung ([facility] )   [facility]     Time reflects when diagnosis was documented in both MDM as applicable and the Disposition within this note     Time User Action Codes Description Comment    8/19/2020  4:11 PM Jennifer Boothe Add [J44 1] COPD exacerbation ([facility] )     8/19/2020  4:12 PM Jennifer Boothe Add [J43 9] Bulla of lung ([facility] )     8/19/2020  4:12 PM Jennifer Boothe Add [R33 941] Avascular necrosis of hip, right [facility])       ED Disposition     ED Disposition Condition Date/Time Comment    Discharge Stable Wed Aug 19, 2020  4:11 PM Artelia Parcel discharge to home/self care  Follow-up Information     Follow up With Specialties Details Why Contact Info Additional [address] Emergency Department Emergency Medicine Go to  If symptoms worsen such as difficulty breathing, chest pain, high fevers etc, otherwise please follow up with your family doctor for re-evaluation of your CT findings and recent weightloss  [address] [facility], [place], [place], [address]    [facility] Family Medicine Schedule an appointment as soon as possible for a visit in 1 day for re-evluation of your symptoms and weight loss [address] [place] [address] Rd  [phone]             Patient's Medications   Discharge Prescriptions    AZITHROMYCIN (ZITHROMAX) 250 MG TABLET    Take 2 tablets today then 1 tablet daily x 4 days       Start Date: 8/19/2020 End Date: 8/23/2020       Order Dose: --       Quantity: 6 tablet    Refills: 0    BENZONATATE (TESSALON PERLES) 100 MG CAPSULE    Take 1 capsule (100 mg total) by mouth 3 (three) times a day as needed for cough       Start Date: 8/19/2020 End Date: --       Order Dose: 100 mg       Quantity: 20 capsule    Refills: 0    PREDNISONE 20 MG TABLET    Take 2 tablets (40 mg total) by mouth daily for 4 days       Start Date: 8/19/2020 End Date: 8/23/2020       Order Dose: 40 mg       Quantity: 8 tablet    Refills: 0     No discharge procedures on file      PDMP Review       Value Time User    PDMP Reviewed  Yes 8/12/2020  8:09 AM Syed Gann MD          ED Provider  Electronically Signed by           Jill Srinivasan PA-C  08/19/20 6523

## 2020-08-20 ENCOUNTER — TELEMEDICINE (OUTPATIENT)
Dept: FAMILY MEDICINE CLINIC | Facility: CLINIC | Age: 75
End: 2020-08-20
Payer: MEDICARE

## 2020-08-20 DIAGNOSIS — R53.1 WEAKNESS: Primary | ICD-10-CM

## 2020-08-20 PROCEDURE — 99442 PR PHYS/QHP TELEPHONE EVALUATION 11-20 MIN: CPT | Performed by: FAMILY MEDICINE

## 2020-08-20 NOTE — PROGRESS NOTES
Virtual Brief Visit    Assessment/Plan:    Problem List Items Addressed This Visit     None      Visit Diagnoses     Weakness    -  Primary    Relevant Orders    TSH, 3rd generation with Free T4 reflex    Vitamin D 25 hydroxy      Patient has a standing appointment with his GI specialist on 8/21  Patient notes that he will mention this new onset of epigastric pain  In regards to his weakness, I will check TSH and Vitamin D  All blood work done in the ED was grossly WNL  Reason for visit is   Chief Complaint   Patient presents with    Virtual Brief Visit        Encounter provider Malina Hendrix DO    Provider located at [address] [place] [id number]    Recent Visits  Date Type Provider Dept   08/20/20 Telemedicine Malina Hendrix DO  [facility]   Showing recent visits within past 7 days and meeting all other requirements     Future Appointments  No visits were found meeting these conditions  Showing future appointments within next 150 days and meeting all other requirements        After connecting through telephone, the patient was identified by name and date of birth  John Bertrand was informed that this is a telemedicine visit and that the visit is being conducted through telephone  Other methods to assure confidentiality were taken using headset  The following individuals were in the room with me and the patient informed fellow residents  He acknowledged consent and understanding of privacy and security of the platform  The patient has agreed to participate and understands he can discontinue the visit at any time  Patient is aware this is a billable service  Subjective    John Bertrand is a 67 y o  male who presented to the ED on 8/19 for weakness and abdominal pain  Patient PMH is significant emphysema and severe chronic GERD and diarrhea   Patient reports that the day he went to the ED, he was feeling weaker than usual   He also has new onset epigastric pain  His last EGD was in 2019 which showed gastritis  He has not had a repeat EGD as there are concerns about his lung function and receiving anesthesia  HPI     Past Medical History:   Diagnosis Date    Anesthesia complication     Difficult to wake up    Arthritis     BPH (benign prostatic hyperplasia)     urinary frequency    Cancer (HCC)     basal cell neck, face    COPD exacerbation (HCC) 12/29/2019    Full dentures     Hiatal hernia     History of methicillin resistant staphylococcus aureus (MRSA)     10/11/2018 MRSA (nares) positive    Irritable bowel syndrome     Kidney stone     at least 7 episodes    Liver disease     Alpha 1- enzyme deficiency - diagnosed 2002  has been on weekly replacement therapy since then    Pulmonary emphysema ([facility] [address] )     1/25/15  FEV1 - 2 45 liters or 59% of predicted    RSV infection 12/2017    Wears glasses     for driving only       Past Surgical History:   Procedure Laterality Date    BACK SURGERY  2008    discectomy    COLONOSCOPY      COLONOSCOPY N/A 3/10/2017    Procedure: Elfredia Reus;  Surgeon: Yakelin Matthew MD;  Location: [facility]; Service:    Karyn Muellerchild      removal of kidney stones    ESOPHAGOGASTRODUODENOSCOPY N/A 3/10/2017    Procedure: ESOPHAGOGASTRODUODENOSCOPY (EGD); Surgeon: Yakelin Matthew MD;  Location: [facility]; Service:     LITHOTRIPSY      [place] ESOPHAGOGASTRODUODENOSCOPY TRANSORAL DIAGNOSTIC N/A 11/20/2018    Procedure: ESOPHAGOGASTRODUODENOSCOPY (EGD); Surgeon: Yakelin Matthew MD;  Location: [facility];   Service: Gastroenterology    TONSILLECTOMY      VEIN LIGATION AND STRIPPING Bilateral     1980's       Current Outpatient Medications   Medication Sig Dispense Refill    amLODIPine (NORVASC) 10 mg tablet Take 1 tablet (10 mg total) by mouth daily 30 tablet 5    azithromycin (ZITHROMAX) 250 mg tablet Take 2 tablets today then 1 tablet daily x 4 days 6 tablet 0    benzonatate (TESSALON PERLES) 100 mg capsule Take 1 capsule (100 mg total) by mouth 3 (three) times a day as needed for cough 20 capsule 0    budesonide (PULMICORT) 0 5 mg/2 mL nebulizer solution Take 1 vial (0 5 mg total) by nebulization 2 (two) times a day for 5 days Rinse mouth after use   10 vial 0    busPIRone (BUSPAR) 10 mg tablet TAKE ONE TABLET BY MOUTH THREE TIMES DAILY  90 tablet 0    cholestyramine sugar free (QUESTRAN LIGHT) 4 g packet Take 1 packet (4 g total) by mouth 2 (two) times a day 60 packet 1    famotidine (PEPCID) 20 mg tablet Take 1 tablet (20 mg total) by mouth daily 30 tablet 3    finasteride (PROSCAR) 5 mg tablet Take 5 mg by mouth every morning        fluticasone (FLONASE) 50 mcg/act nasal spray 2 sprays into each nostril daily 16 g 5    formoterol (PERFOROMIST) 20 MCG/2ML nebulizer solution Take 20 mcg by nebulization 2 (two) times a day      gabapentin (NEURONTIN) 300 mg capsule Take 1 capsule (300 mg total) by mouth 3 (three) times a day 90 capsule 3    ipratropium-albuterol (DUO-NEB) 0 5-2 5 mg/3 mL nebulizer solution Take 1 vial (3 mL total) by nebulization 4 (four) times a day 120 vial 6    LORazepam (Ativan) 1 mg tablet Take 1 tablet (1 mg total) by mouth 2 (two) times a day 60 tablet 0    OXYGEN-HELIUM IN Inhale 2L at rest- 3L with activity      pantoprazole (PROTONIX) 40 mg tablet Take 1 tablet (40 mg total) by mouth 2 (two) times a day 180 tablet 1    predniSONE 20 mg tablet Take 2 tablets (40 mg total) by mouth daily for 4 days 8 tablet 0    tamsulosin (FLOMAX) 0 4 mg Half a tablet daily 30 capsule 5    traZODone (DESYREL) 50 mg tablet TAKE ONE TABLET BY MOUTH NIGHTLY AT BEDTIME  30 tablet 0    triamterene-hydrochlorothiazide (DYAZIDE) 37 5-25 mg per capsule TAKE ONE CAPSULE BY MOUTH DAILY AS NEEDED for leg swelling 20 capsule 0    VENTOLIN  (90 Base) MCG/ACT inhaler       ZEMAIRA infusion        No current facility-administered medications for this visit  Allergies   Allergen Reactions    Chantix [Varenicline]      Caused prostate infection       Review of Systems    There were no vitals filed for this visit  I spent 15 minutes directly with the patient during this visit    VIRTUAL VISIT Doroteo acknowledges that he has consented to an online visit or consultation  He understands that the online visit is based solely on information provided by him, and that, in the absence of a face-to-face physical evaluation by the physician, the diagnosis he receives is both limited and provisional in terms of accuracy and completeness  This is not intended to replace a full medical face-to-face evaluation by the physician  Lauren Verdin understands and accepts these terms

## 2020-08-21 ENCOUNTER — OFFICE VISIT (OUTPATIENT)
Dept: GASTROENTEROLOGY | Facility: CLINIC | Age: 75
End: 2020-08-21
Payer: MEDICARE

## 2020-08-21 VITALS
HEART RATE: 80 BPM | HEIGHT: 77 IN | RESPIRATION RATE: 18 BRPM | WEIGHT: 194 LBS | BODY MASS INDEX: 22.91 KG/M2 | TEMPERATURE: 98 F

## 2020-08-21 DIAGNOSIS — R19.7 DIARRHEA, UNSPECIFIED TYPE: ICD-10-CM

## 2020-08-21 DIAGNOSIS — K21.9 GASTROESOPHAGEAL REFLUX DISEASE, ESOPHAGITIS PRESENCE NOT SPECIFIED: ICD-10-CM

## 2020-08-21 DIAGNOSIS — R11.0 NAUSEA: ICD-10-CM

## 2020-08-21 DIAGNOSIS — R13.19 ESOPHAGEAL DYSPHAGIA: Primary | ICD-10-CM

## 2020-08-21 LAB
ATRIAL RATE: 77 BPM
P AXIS: 1 DEGREES
PR INTERVAL: 162 MS
QRS AXIS: 39 DEGREES
QRSD INTERVAL: 100 MS
QT INTERVAL: 404 MS
QTC INTERVAL: 457 MS
T WAVE AXIS: 30 DEGREES
VENTRICULAR RATE: 77 BPM

## 2020-08-21 PROCEDURE — 93010 ELECTROCARDIOGRAM REPORT: CPT | Performed by: INTERNAL MEDICINE

## 2020-08-21 PROCEDURE — 1036F TOBACCO NON-USER: CPT | Performed by: INTERNAL MEDICINE

## 2020-08-21 PROCEDURE — 3008F BODY MASS INDEX DOCD: CPT | Performed by: INTERNAL MEDICINE

## 2020-08-21 PROCEDURE — 99213 OFFICE O/P EST LOW 20 MIN: CPT | Performed by: INTERNAL MEDICINE

## 2020-08-21 PROCEDURE — 3079F DIAST BP 80-89 MM HG: CPT | Performed by: INTERNAL MEDICINE

## 2020-08-21 PROCEDURE — 4040F PNEUMOC VAC/ADMIN/RCVD: CPT | Performed by: INTERNAL MEDICINE

## 2020-08-21 PROCEDURE — 1160F RVW MEDS BY RX/DR IN RCRD: CPT | Performed by: INTERNAL MEDICINE

## 2020-08-21 PROCEDURE — 3077F SYST BP >= 140 MM HG: CPT | Performed by: INTERNAL MEDICINE

## 2020-08-21 NOTE — PROGRESS NOTES
[facility] - Outpatient Follow-up Note  Enio Richards 67 y o  male MRN: [id number]  Encounter: [id number]          ASSESSMENT AND PLAN:      1  Esophageal dysphagia  - schedule EGD in the hospital (high risk)    2  GERD  3  Nausea  - continue protonix 40 mg twice a day (wait 30 minutes to eat after taking)  - continue famotidine 20 mg nightly  - start lifestyle changes to reduce acid reflux  - stop metoclopramide    4  Diarrhea  - check stool for fecal pancreatic elastase  - continue cholestyramine  - start fiber supplement (metamucil, citrucel, or benefiber) daily     5  Co-morbidities: A1AT def with severe COPD (on 3L O2 NC at rest), chronic respiratory failure, HTN, PE, pulmonary htn    Follow up in 3-4 months    ______________________________________________________________________    SUBJECTIVE:  59-year-old man presents for follow-up dysphagia, GERD and diarrhea  Co-morbidities: A1AT def with severe COPD (on 3L O2 NC at rest), chronic respiratory failure, HTN, PE, pulmonary htn    Seen in hospital for dysphagia, abnormal PET scan with FDG uptake in left side of colon, and chronic diarrhea  Had colonoscopy 01/2020 that showed did not show any masses/lesions, poor prep  Did have EGD 10/2018 showed hiatal hernia and gastric polyp  Dysphagia  - began a few months ago  - food sticking in throat  - solids> solids and liquids  - occurs about 3-4x per week  - no coughing or choking  - declining esophageal manometry at this time    Weight loss  - reports wt loss  - over the last year recorded weight is stable    GERD  - heartburn and acid reflux about 4-5x  - on protonix 40 mg BID and famotidine 20 mg qd  - +nausea all the time    Diarrhea  - 4 BMs per day  - BSS 3  - better on cholestyramine    ROS: No abd pain, vomiting, constipation, odynophagia, hematemesis, melena, hematochezia  REVIEW OF SYSTEMS IS OTHERWISE NEGATIVE      Historical Information   Past Medical History: Diagnosis Date    Anesthesia complication     Difficult to wake up    Arthritis     BPH (benign prostatic hyperplasia)     urinary frequency    Cancer (HCC)     basal cell neck, face    COPD exacerbation ([facility] [address] ) 2019    Full dentures     Hiatal hernia     History of methicillin resistant staphylococcus aureus (MRSA)     10/11/2018 MRSA (nares) positive    Irritable bowel syndrome     Kidney stone     at least 7 episodes    Liver disease     Alpha 1- enzyme deficiency - diagnosed   has been on weekly replacement therapy since then    Pulmonary emphysema ([facility] [address] )     1/25/15  FEV1 - 2 45 liters or 59% of predicted    RSV infection 2017    Wears glasses     for driving only     Past Surgical History:   Procedure Laterality Date    BACK SURGERY      discectomy    COLONOSCOPY      COLONOSCOPY N/A 3/10/2017    Procedure: Jovanni Hoot;  Surgeon: Pearl Hare MD;  [facility]; Service:    Carry Josué      removal of kidney stones    ESOPHAGOGASTRODUODENOSCOPY N/A 3/10/2017    Procedure: ESOPHAGOGASTRODUODENOSCOPY (EGD); Surgeon: Pearl Hare MD;  Location: [facility]; Service:     LITHOTRIPSY      [place] ESOPHAGOGASTRODUODENOSCOPY TRANSORAL DIAGNOSTIC N/A 2018    Procedure: ESOPHAGOGASTRODUODENOSCOPY (EGD); Surgeon: Pearl Hare MD;  Location: [facility];   Service: Gastroenterology    TONSILLECTOMY      VEIN LIGATION AND STRIPPING Bilateral          Social History   Social History     Substance and Sexual Activity   Alcohol Use Not Currently    Frequency: Never    Comment: stopped in      Social History     Substance and Sexual Activity   Drug Use No     Social History     Tobacco Use   Smoking Status Former Smoker    Packs/day: 1 00    Years: 60 00    Pack years: 60 00    Last attempt to quit: 2017    Years since quittin 9   Smokeless Tobacco Never Used   Tobacco Comment    quit in 2017     Family History   Problem Relation Age of Onset    Emphysema Mother         never smoked    Emphysema Father     Cancer Brother         colon    Colon cancer Brother     Ulcerative colitis Family     Liver disease Family        Meds/Allergies       Current Outpatient Medications:     amLODIPine (NORVASC) 10 mg tablet    azithromycin (ZITHROMAX) 250 mg tablet    benzonatate (TESSALON PERLES) 100 mg capsule    busPIRone (BUSPAR) 10 mg tablet    cholestyramine sugar free (QUESTRAN LIGHT) 4 g packet    famotidine (PEPCID) 20 mg tablet    finasteride (PROSCAR) 5 mg tablet    fluticasone (FLONASE) 50 mcg/act nasal spray    formoterol (PERFOROMIST) 20 MCG/2ML nebulizer solution    gabapentin (NEURONTIN) 300 mg capsule    ipratropium-albuterol (DUO-NEB) 0 5-2 5 mg/3 mL nebulizer solution    LORazepam (Ativan) 1 mg tablet    metoclopramide (REGLAN) 10 mg tablet    ondansetron (ZOFRAN) 4 mg tablet    OXYGEN-HELIUM IN    pantoprazole (PROTONIX) 40 mg tablet    predniSONE 20 mg tablet    tamsulosin (FLOMAX) 0 4 mg    traZODone (DESYREL) 50 mg tablet    triamterene-hydrochlorothiazide (DYAZIDE) 37 5-25 mg per capsule    VENTOLIN  (90 Base) MCG/ACT inhaler    ZEMAIRA infusion    budesonide (PULMICORT) 0 5 mg/2 mL nebulizer solution    Allergies   Allergen Reactions    Chantix [Varenicline]      Caused prostate infection           Objective     Pulse 80, temperature 98 °F (36 7 °C), resp  rate 18, height 6' 5" (1 956 m), weight 88 kg (194 lb)  Body mass index is 23 01 kg/m²  PHYSICAL EXAM:      General Appearance:   Alert, cooperative, no distress   HEENT:   Normocephalic, atraumatic, anicteric  Neck:  Supple, symmetrical, trachea midline   Lungs:   Clear to auscultation bilaterally; no rales, rhonchi or wheezing; respirations unlabored    Heart[de-identified]   Regular rate and rhythm; no murmur, rub, or gallop     Abdomen:   Soft, non-tender, non-distended; normal bowel sounds; no masses, no organomegaly    Rectal: Deferred   Neuro:    Alert, oriented, no gross deficits, normal strength and tone   Extremities:  No cyanosis, clubbing or edema    Psych:  Normal mood and affect    Skin:  No jaundice, rashes, or lesions    Lymph nodes:  No palpable cervical lymphadenopathy        Lab Results:   No visits with results within 1 Day(s) from this visit     Latest known visit with results is:   Admission on 08/19/2020, Discharged on 08/19/2020   Component Date Value    WBC 08/19/2020 8 55     RBC 08/19/2020 4 04     Hemoglobin 08/19/2020 13 1     Hematocrit 08/19/2020 39 0     MCV 08/19/2020 97     MCH 08/19/2020 32 4     MCHC 08/19/2020 33 6     RDW 08/19/2020 13 1     MPV 08/19/2020 8 6*    Platelets 17/12/5006 229     Neutrophils Relative 08/19/2020 70     Lymphocytes Relative 08/19/2020 19     Monocytes Relative 08/19/2020 7     Eosinophils Relative 08/19/2020 3     Basophils Relative 08/19/2020 1     Neutrophils Absolute 08/19/2020 6 05     Lymphocytes Absolute 08/19/2020 1 58     Monocytes Absolute 08/19/2020 0 58     Eosinophils Absolute 08/19/2020 0 28     Basophils Absolute 08/19/2020 0 06     Protime 08/19/2020 13 9     INR 08/19/2020 1 08     PTT 08/19/2020 29     Sodium 08/19/2020 138     Potassium 08/19/2020 3 9     Chloride 08/19/2020 103     CO2 08/19/2020 28     ANION GAP 08/19/2020 7     BUN 08/19/2020 12     Creatinine 08/19/2020 1 30     Glucose 08/19/2020 115     Calcium 08/19/2020 8 9     AST 08/19/2020 15     ALT 08/19/2020 24     Alkaline Phosphatase 08/19/2020 73     Total Protein 08/19/2020 6 9     Albumin 08/19/2020 3 6     Total Bilirubin 08/19/2020 0 90     eGFR 08/19/2020 53     Lipase 08/19/2020 132     Troponin I 08/19/2020 <0 02     Magnesium 08/19/2020 2 0     NT-proBNP 08/19/2020 257     Ventricular Rate 08/19/2020 77     Atrial Rate 08/19/2020 77     RI Interval 08/19/2020 162     QRSD Interval 08/19/2020 100     QT Interval 08/19/2020 404     QTC Interval 08/19/2020 457     P Axis 08/19/2020 1     QRS Axis 08/19/2020 39     T Wave Axis 08/19/2020 30          Radiology Results:   Pe Study With Ct Abdomen & Pelvis With Contrast    Result Date: 8/19/2020  Narrative: CT PULMONARY ANGIOGRAM OF THE CHEST AND CT ABDOMEN AND PELVIS WITH INTRAVENOUS CONTRAST INDICATION:  Shortness of breath, left calf pain, abdominal pain and diarrhea, back pain  COMPARISON:  CT chest 5/5/2020, CT abdomen and pelvis 3/8/2020 TECHNIQUE:  CT examination of the chest, abdomen and pelvis was performed  Thin section CT angiographic technique was used in the chest in order to evaluate for pulmonary embolus and coronal 3D MIP postprocessing was performed on the acquisition scanner  Axial, sagittal, and coronal 2D reformatted images were created from the source data and submitted for interpretation  Radiation dose length product (DLP) for this visit:  929 96 mGy-cm   This examination, like all CT scans performed in the [facility], was performed utilizing techniques to minimize radiation dose exposure, including the use of iterative  reconstruction and automated exposure control  IV Contrast:  100 mL of iohexol (OMNIPAQUE) Enteric Contrast:  Enteric contrast was not administered  FINDINGS: CHEST PULMONARY ARTERIAL TREE:  No acute pulmonary embolus is seen  Unchanged appearance of chronic left lower lobe pulmonary embolus  LUNGS:  Numerous small nodules are seen in the left upper lobe, new from the previous study  This is likely infectious or inflammatory  Attention on follow-up recommended  Spiculated 1 cm left lower lobe nodule is not as well-visualized on the current study, possibly secondary to respiratory motion  No significant enlargement within limitations  Background emphysema  Subsegmental atelectasis and/or scarring in the right lower lobe with a 3 4 cm subpleural bulla  Left apical fibrocalcific scarring  No consolidation or endobronchial lesions   PLEURA: No pleural effusion  Posterior right pleural calcifications again noted  HEART/AORTA:  Unremarkable for patient's age  MEDIASTINUM AND MIKE:  Unremarkable  CHEST WALL AND LOWER NECK:   Unremarkable  ABDOMEN LIVER/BILIARY TREE:  Liver is diffusely decreased in density consistent with fatty change  No CT evidence of suspicious hepatic mass  Normal hepatic contours  No biliary dilatation  GALLBLADDER:  No calcified gallstones  No pericholecystic inflammatory change  SPLEEN:  Unremarkable  PANCREAS:  Moderate fatty replacement without acute findings  ADRENAL GLANDS:  Unremarkable  KIDNEYS/URETERS:  Several left renal cysts again noted  2-3 mm nonobstructing right lower pole calculus  No hydronephrosis  STOMACH AND BOWEL:  The stomach is poorly distended, evaluation limited  Small bowel is normal caliber  Colonic diverticulosis without evidence of diverticulitis  APPENDIX:  No findings to suggest appendicitis  ABDOMINOPELVIC CAVITY:  No ascites  No pneumoperitoneum  No lymphadenopathy  VESSELS: Atherosclerotic changes abdominal aorta without evidence of aneurysm  PELVIS REPRODUCTIVE ORGANS:  Prostate is borderline enlarged  URINARY BLADDER:  Unremarkable  ABDOMINAL WALL/INGUINAL REGIONS:  Unremarkable  OSSEOUS STRUCTURES:  No acute fracture or destructive osseous lesion  Multiple degenerative changes of the spine  Sclerosis in the bilateral femoral heads, right greater than left, suggestive of avascular necrosis  No evidence of articular surface collapse  Impression: 1  No acute pulmonary emboli  Stable appearance of chronic left lower lobe pulmonary embolus  2   New micronodules in the left upper lobe suggesting infectious or inflammatory bronchiolitis  3   COPD  1 cm spiculated left lower lobe nodule seen on previous study is not as well visualized currently, possibly related to respiratory motion  No significant enlargement within limitations  Continued CT surveillance in 6 months advised   4  No acute intra-abdominal abnormality  Numerous incidental findings as detailed above  Workstation performed: [id number]     Ct Recon Only Thoracolumbar    Result Date: 8/19/2020  Narrative: CT THORACIC SPINE INDICATION:   back pain, weight loss night sweats  COMPARISON:  CT abdomen pelvis dated 3/18/2020, CT chest dated 1/29/2020  TECHNIQUE: Axial CT examination of the thoracic spine was obtained utilizing reconstructed images from CT of the chest abdomen and pelvis performed the same day  Images were reformatted in the sagittal and coronal planes  This examination, like all CT scans performed in the [facility], was performed utilizing techniques to minimize radiation dose exposure, including the use of iterative reconstruction and automated exposure control  FINDINGS: ALIGNMENT: Smoothly exaggerated upper and mid thoracic kyphosis and mild upper thoracic levoscoliosis  No subluxation  VERTEBRAL BODIES: No fracture  DEGENERATIVE CHANGES: Mild degenerative disc disease and endplate degenerative change with no disc herniation  No canal stenosis or foraminal narrowing within the thoracic spine  Partial visualization of the upper lumbar spine demonstrates loss of disc  height with vacuum phenomenon and diffuse annular bulging at the L1-2 level  Mild canal stenosis and foraminal narrowing  PREVERTEBRAL AND PARASPINAL SOFT TISSUES:  See separate CT chest, abdomen and pelvis report from which this study was reconstructed  Impression: Mild exaggeration of thoracic kyphosis and mild focal upper thoracic levoscoliosis  No acute osseous abnormality  No thoracic canal stenosis or foraminal narrowing  Mild upper lumbar degenerative change at the L1-L2 level  Please note the entire lumbar spine was not reconstructed for this examination  See separately dictated CT of the chest, abdomen and pelvis from which this study was reconstructed   Workstation performed: [id number]

## 2020-08-21 NOTE — PATIENT INSTRUCTIONS
Difficulty swallowing  - schedule EGD in the hospital (high risk)    Acid reflux and heartburn  - continue protonix 40 mg twice a day (wait 30 minutes to eat after taking)  - continue famotidine 20 mg nightly  - start lifestyle changes to reduce acid reflux: cut down on caffeine (coffee, tea, soda, chocolate), peppermint, spicy/greasy foods, trigger foods (citrus, red sauces); avoid laying down for 3 hours after meals; elevate the head of your bed; avoid tight clothing around abdomen  - stop metoclopramide    Diarrhea  - check stool for fecal pancreatic elastase  - continue cholestyramine  - start fiber supplement (metamucil, citrucel, or benefiber) daily (over the counter, generic)    Follow up in 3-4 months

## 2020-08-23 DIAGNOSIS — G62.9 NEUROPATHY: ICD-10-CM

## 2020-08-24 ENCOUNTER — TELEPHONE (OUTPATIENT)
Dept: PULMONOLOGY | Facility: MEDICAL CENTER | Age: 75
End: 2020-08-24

## 2020-08-24 NOTE — TELEPHONE ENCOUNTER
Patient was seen by gastro and was given a order for a egd to be done  The doctor he saw told him to call us and find out if it is ok to have it done  Can you please advise? Thank you

## 2020-08-25 DIAGNOSIS — J44.1 COPD EXACERBATION (HCC): Primary | ICD-10-CM

## 2020-08-25 RX ORDER — PREDNISONE 10 MG/1
TABLET ORAL
Qty: 30 TABLET | Refills: 0 | Status: SHIPPED | OUTPATIENT
Start: 2020-08-25 | End: 2020-09-06

## 2020-08-25 RX ORDER — GABAPENTIN 300 MG/1
CAPSULE ORAL
Qty: 90 CAPSULE | Refills: 0 | Status: SHIPPED | OUTPATIENT
Start: 2020-08-25 | End: 2020-10-15 | Stop reason: SDUPTHER

## 2020-08-25 NOTE — TELEPHONE ENCOUNTER
Discussed with the patient  EGD upcoming is routine  Not urgent  Patient getting over bronchitis and was recently seen in the hospital for such  Treated with a Zithromax and x5 days and 5 days of prednisone  Still coughing, I will prescribe another 5 days of 40 mg prednisone  He is going to stay on his maintenance inhaler therapy

## 2020-08-26 ENCOUNTER — HOSPITAL ENCOUNTER (EMERGENCY)
Facility: HOSPITAL | Age: 75
Discharge: HOME | End: 2020-08-26
Attending: EMERGENCY MEDICINE | Admitting: EMERGENCY MEDICINE
Payer: MEDICARE

## 2020-08-26 ENCOUNTER — APPOINTMENT (EMERGENCY)
Dept: RADIOLOGY | Facility: HOSPITAL | Age: 75
End: 2020-08-26
Payer: MEDICARE

## 2020-08-26 VITALS
TEMPERATURE: 97.9 F | HEART RATE: 66 BPM | OXYGEN SATURATION: 97 % | SYSTOLIC BLOOD PRESSURE: 172 MMHG | RESPIRATION RATE: 22 BRPM | DIASTOLIC BLOOD PRESSURE: 99 MMHG

## 2020-08-26 DIAGNOSIS — R06.00 DYSPNEA: ICD-10-CM

## 2020-08-26 DIAGNOSIS — J44.9 COPD (CHRONIC OBSTRUCTIVE PULMONARY DISEASE) (HCC): Primary | ICD-10-CM

## 2020-08-26 LAB
ALBUMIN SERPL BCP-MCNC: 3.4 G/DL (ref 3.5–5)
ALP SERPL-CCNC: 77 U/L (ref 46–116)
ALT SERPL W P-5'-P-CCNC: 55 U/L (ref 12–78)
ANION GAP SERPL CALCULATED.3IONS-SCNC: 5 MMOL/L (ref 4–13)
APTT PPP: 27 SECONDS (ref 23–37)
AST SERPL W P-5'-P-CCNC: 30 U/L (ref 5–45)
BASOPHILS # BLD AUTO: 0.05 THOUSANDS/ΜL (ref 0–0.1)
BASOPHILS NFR BLD AUTO: 1 % (ref 0–1)
BILIRUB SERPL-MCNC: 1.2 MG/DL (ref 0.2–1)
BUN SERPL-MCNC: 13 MG/DL (ref 5–25)
CALCIUM SERPL-MCNC: 8.9 MG/DL (ref 8.3–10.1)
CHLORIDE SERPL-SCNC: 100 MMOL/L (ref 100–108)
CO2 SERPL-SCNC: 28 MMOL/L (ref 21–32)
CREAT SERPL-MCNC: 1.09 MG/DL (ref 0.6–1.3)
EOSINOPHIL # BLD AUTO: 0.55 THOUSAND/ΜL (ref 0–0.61)
EOSINOPHIL NFR BLD AUTO: 5 % (ref 0–6)
ERYTHROCYTE [DISTWIDTH] IN BLOOD BY AUTOMATED COUNT: 12.9 % (ref 11.6–15.1)
GFR SERPL CREATININE-BSD FRML MDRD: 66 ML/MIN/1.73SQ M
GLUCOSE SERPL-MCNC: 104 MG/DL (ref 65–140)
HCT VFR BLD AUTO: 44.1 % (ref 36.5–49.3)
HGB BLD-MCNC: 14.2 G/DL (ref 12–17)
IMM GRANULOCYTES # BLD AUTO: 0.06 THOUSAND/UL (ref 0–0.2)
IMM GRANULOCYTES NFR BLD AUTO: 1 % (ref 0–2)
INR PPP: 1.04 (ref 0.84–1.19)
LYMPHOCYTES # BLD AUTO: 2.3 THOUSANDS/ΜL (ref 0.6–4.47)
LYMPHOCYTES NFR BLD AUTO: 21 % (ref 14–44)
MCH RBC QN AUTO: 32.5 PG (ref 26.8–34.3)
MCHC RBC AUTO-ENTMCNC: 32.2 G/DL (ref 31.4–37.4)
MCV RBC AUTO: 101 FL (ref 82–98)
MONOCYTES # BLD AUTO: 0.72 THOUSAND/ΜL (ref 0.17–1.22)
MONOCYTES NFR BLD AUTO: 7 % (ref 4–12)
NEUTROPHILS # BLD AUTO: 7.42 THOUSANDS/ΜL (ref 1.85–7.62)
NEUTS SEG NFR BLD AUTO: 65 % (ref 43–75)
NRBC BLD AUTO-RTO: 0 /100 WBCS
NT-PROBNP SERPL-MCNC: 217 PG/ML
PLATELET # BLD AUTO: 182 THOUSANDS/UL (ref 149–390)
PMV BLD AUTO: 9.1 FL (ref 8.9–12.7)
POTASSIUM SERPL-SCNC: 4 MMOL/L (ref 3.5–5.3)
PROT SERPL-MCNC: 6.5 G/DL (ref 6.4–8.2)
PROTHROMBIN TIME: 13.5 SECONDS (ref 11.6–14.5)
RBC # BLD AUTO: 4.37 MILLION/UL (ref 3.88–5.62)
SODIUM SERPL-SCNC: 133 MMOL/L (ref 136–145)
TROPONIN I SERPL-MCNC: <0.02 NG/ML
WBC # BLD AUTO: 11.1 THOUSAND/UL (ref 4.31–10.16)

## 2020-08-26 PROCEDURE — 71045 X-RAY EXAM CHEST 1 VIEW: CPT

## 2020-08-26 PROCEDURE — 85730 THROMBOPLASTIN TIME PARTIAL: CPT | Performed by: EMERGENCY MEDICINE

## 2020-08-26 PROCEDURE — 80053 COMPREHEN METABOLIC PANEL: CPT | Performed by: EMERGENCY MEDICINE

## 2020-08-26 PROCEDURE — 99285 EMERGENCY DEPT VISIT HI MDM: CPT

## 2020-08-26 PROCEDURE — 84484 ASSAY OF TROPONIN QUANT: CPT | Performed by: EMERGENCY MEDICINE

## 2020-08-26 PROCEDURE — 36415 COLL VENOUS BLD VENIPUNCTURE: CPT | Performed by: EMERGENCY MEDICINE

## 2020-08-26 PROCEDURE — 83880 ASSAY OF NATRIURETIC PEPTIDE: CPT | Performed by: EMERGENCY MEDICINE

## 2020-08-26 PROCEDURE — 94640 AIRWAY INHALATION TREATMENT: CPT

## 2020-08-26 PROCEDURE — 85025 COMPLETE CBC W/AUTO DIFF WBC: CPT | Performed by: EMERGENCY MEDICINE

## 2020-08-26 PROCEDURE — 99285 EMERGENCY DEPT VISIT HI MDM: CPT | Performed by: EMERGENCY MEDICINE

## 2020-08-26 PROCEDURE — 96374 THER/PROPH/DIAG INJ IV PUSH: CPT

## 2020-08-26 PROCEDURE — 85610 PROTHROMBIN TIME: CPT | Performed by: EMERGENCY MEDICINE

## 2020-08-26 PROCEDURE — 93005 ELECTROCARDIOGRAM TRACING: CPT

## 2020-08-26 RX ORDER — IPRATROPIUM BROMIDE AND ALBUTEROL SULFATE 2.5; .5 MG/3ML; MG/3ML
3 SOLUTION RESPIRATORY (INHALATION)
Status: DISCONTINUED | OUTPATIENT
Start: 2020-08-26 | End: 2020-08-26 | Stop reason: HOSPADM

## 2020-08-26 RX ORDER — METHYLPREDNISOLONE SODIUM SUCCINATE 125 MG/2ML
60 INJECTION, POWDER, LYOPHILIZED, FOR SOLUTION INTRAMUSCULAR; INTRAVENOUS ONCE
Status: COMPLETED | OUTPATIENT
Start: 2020-08-26 | End: 2020-08-26

## 2020-08-26 RX ORDER — PREDNISONE 20 MG/1
20 TABLET ORAL DAILY
Qty: 20 TABLET | Refills: 0 | Status: SHIPPED | OUTPATIENT
Start: 2020-08-26 | End: 2020-09-16 | Stop reason: HOSPADM

## 2020-08-26 RX ADMIN — METHYLPREDNISOLONE SODIUM SUCCINATE 60 MG: 125 INJECTION, POWDER, FOR SOLUTION INTRAMUSCULAR; INTRAVENOUS at 14:00

## 2020-08-26 RX ADMIN — IPRATROPIUM BROMIDE AND ALBUTEROL SULFATE 3 ML: 2.5; .5 SOLUTION RESPIRATORY (INHALATION) at 14:02

## 2020-08-26 NOTE — ED PROVIDER NOTES
History  Chief Complaint   Patient presents with    Shortness of Breath     increasing shortness of breath over past couple of days    Leg Pain     states intermittent L lower leg and foot numbness  states has had before but today worse     Patient has a history of COPD with bullous emphysema  He states he finished his last course of steroids about 3 days ago  He was given a new prescription refill on the steroids but has not started it yet  Patient states he started having shortness of breath yesterday, occurring even at rest, but worse with exertion  He has had no fever, no chills, no change in sputum or cough  He has no chest pain  Patient states he used his portable pulse oximeter during these episodes of shortness of breath, and notes normal oxygenation 96- 97 percent          Prior to Admission Medications   Prescriptions Last Dose Informant Patient Reported? Taking? LORazepam (Ativan) 1 mg tablet   No No   Sig: Take 1 tablet (1 mg total) by mouth 2 (two) times a day   OXYGEN-HELIUM IN  Self Yes No   Sig: Inhale 2L at rest- 3L with activity   VENTOLIN  (90 Base) MCG/ACT inhaler  Self Yes No   ZEMAIRA infusion  Self Yes No   amLODIPine (NORVASC) 10 mg tablet   No No   Sig: Take 1 tablet (10 mg total) by mouth daily   benzonatate (TESSALON PERLES) 100 mg capsule   No No   Sig: Take 1 capsule (100 mg total) by mouth 3 (three) times a day as needed for cough   budesonide (PULMICORT) 0 5 mg/2 mL nebulizer solution  Self No No   Sig: Take 1 vial (0 5 mg total) by nebulization 2 (two) times a day for 5 days Rinse mouth after use     busPIRone (BUSPAR) 10 mg tablet   No No   Sig: TAKE ONE TABLET BY MOUTH THREE TIMES DAILY    cholestyramine sugar free (QUESTRAN LIGHT) 4 g packet   No No   Sig: Take 1 packet (4 g total) by mouth 2 (two) times a day   famotidine (PEPCID) 20 mg tablet   No No   Sig: Take 1 tablet (20 mg total) by mouth daily   finasteride (PROSCAR) 5 mg tablet  Self Yes No   Sig: Take 5 mg by mouth every morning     fluticasone (FLONASE) 50 mcg/act nasal spray   No No   Si sprays into each nostril daily   formoterol (PERFOROMIST) 20 MCG/2ML nebulizer solution  Self Yes No   Sig: Take 20 mcg by nebulization 2 (two) times a day   gabapentin (NEURONTIN) 300 mg capsule   No No   Sig: TAKE ONE CAPSULE BY MOUTH THREE TIMES DAILY    ipratropium-albuterol (DUO-NEB) 0 5-2 5 mg/3 mL nebulizer solution  Self No No   Sig: Take 1 vial (3 mL total) by nebulization 4 (four) times a day   pantoprazole (PROTONIX) 40 mg tablet  Self No No   Sig: Take 1 tablet (40 mg total) by mouth 2 (two) times a day   predniSONE 10 mg tablet   No No   Sig: Take 4 tablets (40 mg total) by mouth daily for 3 days, THEN 3 tablets (30 mg total) daily for 3 days, THEN 2 tablets (20 mg total) daily for 3 days, THEN 1 tablet (10 mg total) daily for 3 days  tamsulosin (FLOMAX) 0 4 mg   No No   Sig: Half a tablet daily   traZODone (DESYREL) 50 mg tablet   No No   Sig: TAKE ONE TABLET BY MOUTH NIGHTLY AT BEDTIME    triamterene-hydrochlorothiazide (DYAZIDE) 37 5-25 mg per capsule   No No   Sig: TAKE ONE CAPSULE BY MOUTH DAILY AS NEEDED for leg swelling      Facility-Administered Medications: None       Past Medical History:   Diagnosis Date    Anesthesia complication     Difficult to wake up    Arthritis     BPH (benign prostatic hyperplasia)     urinary frequency    Cancer (HCC)     basal cell neck, face    COPD exacerbation ([facility] ) 2019    Full dentures     Hiatal hernia     History of methicillin resistant staphylococcus aureus (MRSA)     10/11/2018 MRSA (nares) positive    Irritable bowel syndrome     Kidney stone     at least 7 episodes    Liver disease     Alpha 1- enzyme deficiency - diagnosed    has been on weekly replacement therapy since then    Pulmonary emphysema ([facility] [address] )     1/25/15  FEV1 - 2 45 liters or 59% of predicted    RSV infection 2017    Wears glasses     for driving only       Past Surgical History:   Procedure Laterality Date    BACK SURGERY  2008    discectomy    COLONOSCOPY      COLONOSCOPY N/A 3/10/2017    Procedure: Paty Kras;  Surgeon: Patty North MD;  Location: [facility] GI LAB; Service:    Eric Blue      removal of kidney stones    ESOPHAGOGASTRODUODENOSCOPY N/A 3/10/2017    Procedure: ESOPHAGOGASTRODUODENOSCOPY (EGD); Surgeon: Patty North MD;  Location: [facility] GI LAB; Service:     LITHOTRIPSY      CA ESOPHAGOGASTRODUODENOSCOPY TRANSORAL DIAGNOSTIC N/A 2018    Procedure: ESOPHAGOGASTRODUODENOSCOPY (EGD); Surgeon: Patty North MD;  Location: [facility] GI LAB; Service: Gastroenterology    TONSILLECTOMY      VEIN LIGATION AND STRIPPING Bilateral     18's       Family History   Problem Relation Age of Onset    Emphysema Mother         never smoked    Emphysema Father     Cancer Brother         colon    Colon cancer Brother     Ulcerative colitis Family     Liver disease Family      I have reviewed and agree with the history as documented  E-Cigarette/Vaping    E-Cigarette Use Never User      E-Cigarette/Vaping Substances     Social History     Tobacco Use    Smoking status: Former Smoker     Packs/day: 1 00     Years: 60 00     Pack years: 60 00     Last attempt to quit: 2017     Years since quittin 9    Smokeless tobacco: Never Used    Tobacco comment: quit in 2017   Substance Use Topics    Alcohol use: Not Currently     Frequency: Never     Comment: stopped in     Drug use: No       Review of Systems   Constitutional: Negative for chills and fever  HENT: Negative for congestion and sore throat  Eyes: Negative for visual disturbance  Respiratory: Positive for cough and shortness of breath  Negative for chest tightness  Cardiovascular: Positive for leg swelling  Negative for chest pain  Gastrointestinal: Negative for abdominal pain  Genitourinary: Negative for dysuria  Musculoskeletal: Negative for back pain  Skin: Negative for rash  Neurological: Negative for headaches  Hematological: Does not bruise/bleed easily  Psychiatric/Behavioral: Negative for confusion  All other systems reviewed and are negative  Physical Exam  Physical Exam  Vitals signs and nursing note reviewed  Constitutional:       Appearance: He is well-developed  HENT:      Head: Normocephalic  Mouth/Throat:      Mouth: Mucous membranes are moist    Eyes:      Pupils: Pupils are equal, round, and reactive to light  Neck:      Musculoskeletal: Normal range of motion  Cardiovascular:      Rate and Rhythm: Normal rate and regular rhythm  Pulmonary:      Effort: Pulmonary effort is normal       Breath sounds: Wheezing present  Comments: Patient has end expiratory wheeze with FEV  Chest:      Chest wall: No tenderness  Musculoskeletal: Normal range of motion  Right lower leg: No edema  Left lower leg: Edema present  Skin:     General: Skin is warm and dry  Capillary Refill: Capillary refill takes less than 2 seconds  Neurological:      General: No focal deficit present  Mental Status: He is alert and oriented to person, place, and time     Psychiatric:         Mood and Affect: Mood normal          Behavior: Behavior normal          Vital Signs  ED Triage Vitals [08/26/20 1318]   Temperature Pulse Respirations Blood Pressure SpO2   97 9 °F (36 6 °C) 80 (!) 24 164/100 98 %      Temp Source Heart Rate Source Patient Position - Orthostatic VS BP Location FiO2 (%)   Tympanic Monitor Sitting Left arm --      Pain Score       --           Vitals:    08/26/20 1400 08/26/20 1500 08/26/20 1515 08/26/20 1545   BP: 157/84 (!) 201/91 (!) 201/91 170/96   Pulse: 78 68 70 66   Patient Position - Orthostatic VS:  Sitting           Visual Acuity      ED Medications  Medications   ipratropium-albuterol (DUO-NEB) 0 5-2 5 mg/3 mL inhalation solution 3 mL (3 mL Nebulization Given 8/26/20 1402)   methylPREDNISolone sodium succinate (Solu-MEDROL) injection 60 mg (60 mg Intravenous Given 8/26/20 1400)       Diagnostic Studies  Results Reviewed     Procedure Component Value Units Date/Time    NT-BNP PRO [[id number]]  (Normal) Collected:  08/26/20 1354    Lab Status:  Final result Specimen:  Blood from Arm, Left Updated:  08/26/20 1422     NT-proBNP 217 pg/mL     Troponin I [[id number]]  (Normal) Collected:  08/26/20 1351    Lab Status:  Final result Specimen:  Blood from Arm, Left Updated:  08/26/20 1420     Troponin I <0 02 ng/mL     Comprehensive metabolic panel [[id number]]  (Abnormal) Collected:  08/26/20 1354    Lab Status:  Final result Specimen:  Blood from Arm, Left Updated:  08/26/20 1416     Sodium 133 mmol/L      Potassium 4 0 mmol/L      Chloride 100 mmol/L      CO2 28 mmol/L      ANION GAP 5 mmol/L      BUN 13 mg/dL      Creatinine 1 09 mg/dL      Glucose 104 mg/dL      Calcium 8 9 mg/dL      AST 30 U/L      ALT 55 U/L      Alkaline Phosphatase 77 U/L      Total Protein 6 5 g/dL      Albumin 3 4 g/dL      Total Bilirubin 1 20 mg/dL      eGFR 66 ml/min/1 73sq m     Narrative:       [place] guidelines for Chronic Kidney Disease (CKD):     Stage 1 with normal or high GFR (GFR > 90 mL/min/1 73 square meters)    Stage 2 Mild CKD (GFR = 60-89 mL/min/1 73 square meters)    Stage 3A Moderate CKD (GFR = 45-59 mL/min/1 73 square meters)    Stage 3B Moderate CKD (GFR = 30-44 mL/min/1 73 square meters)    Stage 4 Severe CKD (GFR = 15-29 mL/min/1 73 square meters)    Stage 5 End Stage CKD (GFR <15 mL/min/1 73 square meters)  Note: GFR calculation is accurate only with a steady state creatinine    Protime-INR [[id number]]  (Normal) Collected:  08/26/20 1351    Lab Status:  Final result Specimen:  Blood from Arm, Left Updated:  08/26/20 1411     Protime 13 5 seconds      INR 1 04    APTT [[id number]]  (Normal) Collected:  08/26/20 1351    Lab Status:  Final result Specimen:  Blood from Arm, Left Updated: 08/26/20 1411     PTT 27 seconds     CBC and differential [[id number]]  (Abnormal) Collected:  08/26/20 1350    Lab Status:  Final result Specimen:  Blood from Arm, Left Updated:  08/26/20 1359     WBC 11 10 Thousand/uL      RBC 4 37 Million/uL      Hemoglobin 14 2 g/dL      Hematocrit 44 1 %       fL      MCH 32 5 pg      MCHC 32 2 g/dL      RDW 12 9 %      MPV 9 1 fL      Platelets 913 Thousands/uL      nRBC 0 /100 WBCs      Neutrophils Relative 65 %      Immat GRANS % 1 %      Lymphocytes Relative 21 %      Monocytes Relative 7 %      Eosinophils Relative 5 %      Basophils Relative 1 %      Neutrophils Absolute 7 42 Thousands/µL      Immature Grans Absolute 0 06 Thousand/uL      Lymphocytes Absolute 2 30 Thousands/µL      Monocytes Absolute 0 72 Thousand/µL      Eosinophils Absolute 0 55 Thousand/µL      Basophils Absolute 0 05 Thousands/µL                  XR chest 1 view portable   Final Result by Rox Mccurdy MD (08/26 6335)      No significant change since the chest CT from 8/19/2020 with tiny inflammatory nodules in the upper lobes  Emphysema  Workstation performed: [id number]                    Procedures  ECG 12 Lead Documentation Only    Date/Time: 8/26/2020 1:46 PM  Performed by: Srinivasan Mcknight MD  Authorized by: Srinivasan Mcknight MD     Indications / Diagnosis:  SOB  ECG reviewed by me, the ED Provider: yes    Patient location:  ED  Interpretation:     Interpretation: abnormal    Rate:     ECG rate:  74    ECG rate assessment: normal    Rhythm:     Rhythm: sinus rhythm    Ectopy:     Ectopy: none    QRS:     QRS axis:  Normal    QRS intervals:  Normal  Conduction:     Conduction: normal    ST segments:     ST segments:  Normal  T waves:     T waves: normal    Q waves:     Q waves:  II, III, aVF and V6             ED Course                   DIANE/DAST-10      Most Recent Value   How many times in the past year have you       Used an illegal drug or used a prescription medication for non-medical reasons? Never Filed at: 08/26/2020 1319                                [facility]  Number of Diagnoses or Management Options  Diagnosis management comments: Patient has late stage COPD, may be becoming steroid dependent        Disposition  Final diagnoses:   Dyspnea   COPD (chronic obstructive pulmonary disease) ([place] [address] )     Time reflects when diagnosis was documented in both MDM as applicable and the Disposition within this note     Time User Action Codes Description Comment    8/26/2020  3:55 PM Azzie Munch A Add [J44 1] COPD exacerbation ([facility] )     8/26/2020  3:55 PM Azzie Munch A Add [R06 00] Dyspnea     8/26/2020  3:55 PM Azzie Munch A Modify [R06 00] Dyspnea     8/26/2020  3:55 PM Azzie Munch A Remove [J44 1] COPD exacerbation ([place] [address] )     8/26/2020  3:55 PM Azzie Munch A Add [J44 9] COPD (chronic obstructive pulmonary disease) ([place] [address] )     8/26/2020  3:55 PM Azzie Munch A Modify [R06 00] Dyspnea     8/26/2020  3:55 PM Azzie Munch A Modify [J44 9] COPD (chronic obstructive pulmonary disease) [facility])       ED Disposition     ED Disposition Condition Date/Time Comment    Discharge Stable Wed Aug 26, 2020  3:55 PM Manny Jeronimo discharge to home/self care  Follow-up Information     Follow up With Specialties Details Why Contact Info    Jose Guadalupe Kearns MD Family Medicine Schedule an appointment as soon as possible for a visit   [address] [place] [address] [phone]            Patient's Medications   Discharge Prescriptions    PREDNISONE 20 MG TABLET    Take 1 tablet (20 mg total) by mouth daily for 20 days       Start Date: 8/26/2020 End Date: 9/15/2020       Order Dose: 20 mg       Quantity: 20 tablet    Refills: 0     No discharge procedures on file      PDMP Review       Value Time User    PDMP Reviewed  Yes 8/12/2020  8:09 AM Jose Guadalupe Kearns MD          ED Provider  Electronically Signed by           Te Persaud MD  08/26/20 9101

## 2020-08-28 ENCOUNTER — APPOINTMENT (OUTPATIENT)
Dept: PULMONOLOGY | Facility: CLINIC | Age: 75
End: 2020-08-28
Payer: MEDICARE

## 2020-08-29 LAB
ATRIAL RATE: 74 BPM
P AXIS: -14 DEGREES
PR INTERVAL: 142 MS
QRS AXIS: 37 DEGREES
QRSD INTERVAL: 98 MS
QT INTERVAL: 398 MS
QTC INTERVAL: 441 MS
T WAVE AXIS: 36 DEGREES
VENTRICULAR RATE: 74 BPM

## 2020-08-29 PROCEDURE — 93010 ELECTROCARDIOGRAM REPORT: CPT | Performed by: INTERNAL MEDICINE

## 2020-09-02 DIAGNOSIS — G47.01 INSOMNIA DUE TO MEDICAL CONDITION: ICD-10-CM

## 2020-09-02 RX ORDER — TRAZODONE HYDROCHLORIDE 50 MG/1
TABLET ORAL
Qty: 30 TABLET | Refills: 0 | Status: SHIPPED | OUTPATIENT
Start: 2020-09-02 | End: 2020-09-16 | Stop reason: HOSPADM

## 2020-09-04 ENCOUNTER — APPOINTMENT (OUTPATIENT)
Dept: LAB | Facility: HOSPITAL | Age: 75
End: 2020-09-04
Payer: MEDICARE

## 2020-09-04 ENCOUNTER — APPOINTMENT (OUTPATIENT)
Dept: LAB | Facility: CLINIC | Age: 75
End: 2020-09-04
Payer: MEDICARE

## 2020-09-04 ENCOUNTER — CLINICAL SUPPORT (OUTPATIENT)
Dept: PULMONOLOGY | Facility: CLINIC | Age: 75
End: 2020-09-04
Payer: MEDICARE

## 2020-09-04 VITALS — BODY MASS INDEX: 22.2 KG/M2 | HEIGHT: 77 IN | WEIGHT: 188 LBS

## 2020-09-04 DIAGNOSIS — R19.7 DIARRHEA, UNSPECIFIED TYPE: ICD-10-CM

## 2020-09-04 DIAGNOSIS — R53.1 WEAKNESS: ICD-10-CM

## 2020-09-04 DIAGNOSIS — J43.2 CENTRILOBULAR EMPHYSEMA (HCC): ICD-10-CM

## 2020-09-04 LAB — TSH SERPL DL<=0.05 MIU/L-ACNC: 1.15 UIU/ML (ref 0.36–3.74)

## 2020-09-04 PROCEDURE — 36415 COLL VENOUS BLD VENIPUNCTURE: CPT

## 2020-09-04 PROCEDURE — 82656 EL-1 FECAL QUAL/SEMIQ: CPT

## 2020-09-04 PROCEDURE — 82306 VITAMIN D 25 HYDROXY: CPT

## 2020-09-04 PROCEDURE — 84443 ASSAY THYROID STIM HORMONE: CPT

## 2020-09-04 PROCEDURE — G0424 PULMONARY REHAB W EXER: HCPCS

## 2020-09-04 NOTE — PROGRESS NOTES
Camilo ARMENTA Jhon   1945     Risk: high     Pre Post % Change Goal   Date: 2/13/2020 9/4/2020     Physical       CAT 27 27  10% increase   6MWT (feet) 680   10% increase   UCSD Dyspnea Score 82 66  5 pt decrease   Arm Curl 15      Chair to Stand 8      Peak exercise CR/IN (mets) 2 0 1 4  40% increase   Emotional       PHQ9 (> 5   refer to MD) 10 15  4 pt decrease   GAD7 4 5     [facility] (lower score = improvement)        Total 32 35  < 27   Feelings 2 3  < 3   Physical Fitness 4 5  < 3   Social Support 3 4  < 3   Daily Activities 4 4  < 3   Social Activities 5 4  < 3   Pain 4 4  < 3   Overall Health 5 4  < 3   Quality of Life 3 3  < 3   Change in Health 2 4  < 3   Dietary       Rate your plate 41 34  > 58   Measurements       Weight 192 5 188  2 5 - 5%   BMI 22 8 22  19 - 25   Waist Circ  42 5 42  < 40 M / < 35 F   % Body fat 34 9 31 7  < 25 M / < 33 F   BP left arm               (systolic) 405 805  < 469   (diastolic) 78 68  < 90   Smoking #/day  (if applicable) 0   0   Lipids/Glucose (Date) 11/11/19      Total cholesterol 168   50 - 200   Triglycerides 66   < 150   HDL 40   40 - 60      < 100   A1C    4 0 - 5 6%   Fasting BG

## 2020-09-04 NOTE — PROGRESS NOTES
Pulmonary Rehabilitation Plan of Care   Discharge      Today's date: 2020   Visits: 24  Patient name: Manny Jeronimo      : 1945  Age: 67 y o  MRN: [id number]  Referring Physician: Renetta Maloney PA-C  Pulmonologist: Suresh Heart  Provider: [place]  Clinician: Lyudmila Reed MS  Dx:   Encounter Diagnosis   Name Primary?  Centrilobular emphysema ([address] )      Date of onset: 2019      SUMMARY OF PROGRESS:  Mr Marisa Borja has completed 24 sessions of the pulmonary rehabilitation program  He completes 28- 35 minutes of cardiovascular exercise with a light weight strength training component  His exercise MET level is 1 4  RPE 3-6 RPD 2-6  Resting oxygenation rate was 96-99% on 3L/min of oxygen via nasal canula and during peak exercise 93-99%  USCD score has improved 19 5%  Has been doing very low level exercise  He has been to the hospital recently due to lack of appetite, increased shortness of breath, unable to eat any food and weight loss  He has lost 6 lbs since his last session  Stated he has seen a gastroenterologist but nothing has changed  Unable to complete outcomes due to weakness and unsteady gait  States he wanted to finish his last session and he has a follow up with Dr MIRELES [facility] on 9/15/20  He occasionally complains of a light headed feeling  Short of breath and fatigue with minimal exertion  Complained of left leg discomfort and stated he has fallen at home because of his left leg   He has had face to face visit with pulmonologist  He will be discharged from the pulmonary rehab program  Will continue to follow up with pulmonologist         Medication compliance: Yes   Comments: states he is taking his medication as prescribed  Fall Risk: High   Comments: leg fatigue, short of breath and fatigue with minimal exertion    EKG changes: NSR      EXERCISE ASSESSMENT and PLAN    Current Exercise Program in Rehab:       Frequency: 3 days/week        Minutes: 35-40         METS: 1 4            HR:    RPE: 3-6         Modalities: Treadmill, UBE, NuStep and Recumbent bike        Strength trainin-3 days / week  12-15 repetitions  1-2 sets per modality    Modalities: Lateral Raise, Arm Curl, Upright Rows, Front Raises and Shoulder Shrugs    Home Exercise: increase endurance to return to playing billiards, shuffle board and return to shopping for clothes    Goals:  increase endurance to return to playing billiards, shuffle board and return to shopping for clothes  Education: Benefit of exercise for CAD risk factors, home exercise guidelines, signs and sxs and RPE scale   Plan:increase endurance to continue to walk at home  Readiness to change: Preparation:  (Getting ready to change)       NUTRITION ASSESSMENT AND PLAN    Weight control:    Starting weight: 192 8   Current weight:   188  Waist circumference:    Startin 5   Current:  42  Diabetes: N/A  Lipid management: Discussed diet and lipid management and Last lipid profile 2019  Chol 167  TRG 66  HDL 40    Goals:no change at this time  Education: heart healthy diet, how to read food labels  Progressing:No  Plan: Mr Josué Pacheco refuse to make dietary changes at this time  Readiness to change: Pre-Contemplation:   (Not yet acknowledging that there is a problem behavior that needs to change)      PSYCHOSOCIAL ASSESSMENT AND PLAN    Emotional:  Depression assessment:  PHQ-9 = 10-14 = Moderate Depression            Anxiety measure:  PAULINA-7 = 0-4  = Not anxious  Self-reported stress level: 5   Social support: Good   Goals:  PHQ-9 - reduced severity by one level, continue medical therapy, improved positive thoughts of well being and increased energy  Education: signs/sxs of depression, benefits of positive support system, coping mechanisms and states he follows up regularly with primary MD in regards to depression, stress and relaxation tips, [place] information     Plan: Practice relaxation techniques and continue to follow up with MD and take medication as prescribed  Readiness to change: Action:  (Changing behavior)      OTHER CORE COMPONENTS     Tobacco:   Social History     Tobacco Use   Smoking Status Former Smoker    Packs/day: 1 00    Years: 60 00    Pack years: 60 00    Last attempt to quit: 2017    Years since quitting: 3 0   Smokeless Tobacco Never Used   Tobacco Comment    quit in 2017       Tobacco Use Intervention: Referral to tobacco expert:   N/A    Blood pressure:    Restin/68   Exercise: 110/68    Goals: consistent BP < 130/80  Education:  understanding HTN and CAD and low sodium diet and HTN, breathing retraining, how to avoid infections, hand hygiene    Plan: Class: Understanding Heart Disease and Class: Common Heart Medications  Readiness to change: Action:  (Changing behavior)     See scanned exercise session detailed report

## 2020-09-05 LAB — 25(OH)D3 SERPL-MCNC: 15.9 NG/ML (ref 30–100)

## 2020-09-08 DIAGNOSIS — F41.9 ANXIETY: ICD-10-CM

## 2020-09-09 RX ORDER — LORAZEPAM 1 MG/1
TABLET ORAL
Qty: 60 TABLET | Refills: 0 | Status: SHIPPED | OUTPATIENT
Start: 2020-09-09 | End: 2020-10-19

## 2020-09-11 ENCOUNTER — OFFICE VISIT (OUTPATIENT)
Dept: FAMILY MEDICINE CLINIC | Facility: CLINIC | Age: 75
End: 2020-09-11
Payer: MEDICARE

## 2020-09-11 VITALS
HEIGHT: 77 IN | SYSTOLIC BLOOD PRESSURE: 150 MMHG | HEART RATE: 99 BPM | DIASTOLIC BLOOD PRESSURE: 78 MMHG | TEMPERATURE: 98.2 F | RESPIRATION RATE: 20 BRPM | WEIGHT: 186.2 LBS | OXYGEN SATURATION: 96 % | BODY MASS INDEX: 21.99 KG/M2

## 2020-09-11 DIAGNOSIS — G47.00 INSOMNIA, UNSPECIFIED TYPE: Primary | ICD-10-CM

## 2020-09-11 DIAGNOSIS — J96.11 CHRONIC RESPIRATORY FAILURE WITH HYPOXIA (HCC): ICD-10-CM

## 2020-09-11 DIAGNOSIS — I27.20 PULMONARY HYPERTENSION (HCC): ICD-10-CM

## 2020-09-11 DIAGNOSIS — J43.2 CENTRILOBULAR EMPHYSEMA (HCC): ICD-10-CM

## 2020-09-11 PROCEDURE — 99213 OFFICE O/P EST LOW 20 MIN: CPT | Performed by: FAMILY MEDICINE

## 2020-09-11 NOTE — PROGRESS NOTES
Subjective:           Problem List Items Addressed This Visit        Respiratory    Chronic respiratory failure with hypoxia ([address] )    Centrilobular emphysema ([facility] )       Cardiovascular and Mediastinum    Pulmonary hypertension ([facility] [address] )       Other    Insomnia - Primary              No orders of the defined types were placed in this encounter  There are no Patient Instructions on file for this visit  BMI Counseling: Body mass index is 22 08 kg/m²  Discussed the patient's BMI with him   The [address] [address]    Chief Complaint   Patient presents with    Insomnia     HPI Poor sleep worse on prednsione for Resp failure COPD follow s with Pulmonary Recent ED visit    /78 (BP Location: Left arm, Patient Position: Sitting, Cuff Size: Adult)   Pulse 99   Temp 98 2 °F (36 8 °C) (Tympanic)   Resp 20   Ht 6' 5" (1 956 m)   Wt 84 5 kg (186 lb 3 2 oz)   SpO2 96% Comment: 3L oxygen  BMI 22 08 kg/m²       Allergies   Allergen Reactions    Chantix [Varenicline]      Caused prostate infection       Current Outpatient Medications on File Prior to Visit   Medication Sig Dispense Refill    amLODIPine (NORVASC) 10 mg tablet Take 1 tablet (10 mg total) by mouth daily 30 tablet 5    benzonatate (TESSALON PERLES) 100 mg capsule Take 1 capsule (100 mg total) by mouth 3 (three) times a day as needed for cough 20 capsule 0    famotidine (PEPCID) 20 mg tablet Take 1 tablet (20 mg total) by mouth daily 30 tablet 3    fluticasone (FLONASE) 50 mcg/act nasal spray 2 sprays into each nostril daily 16 g 5    gabapentin (NEURONTIN) 300 mg capsule TAKE ONE CAPSULE BY MOUTH THREE TIMES DAILY  90 capsule 0    ipratropium-albuterol (DUO-NEB) 0 5-2 5 mg/3 mL nebulizer solution Take 1 vial (3 mL total) by nebulization 4 (four) times a day 120 vial 6    LORazepam (ATIVAN) 1 mg tablet TAKE ONE TABLET BY MOUTH TWICE DAILY  60 tablet 0    OXYGEN-HELIUM IN Inhale 2L at rest- 3L with activity      predniSONE 20 mg tablet Take 1 tablet (20 mg total) by mouth daily for 20 days 20 tablet 0    tamsulosin (FLOMAX) 0 4 mg Half a tablet daily 30 capsule 5    traZODone (DESYREL) 50 mg tablet TAKE ONE TABLET BY MOUTH NIGHTLY AT BEDTIME  30 tablet 0    VENTOLIN  (90 Base) MCG/ACT inhaler       budesonide (PULMICORT) 0 5 mg/2 mL nebulizer solution Take 1 vial (0 5 mg total) by nebulization 2 (two) times a day for 5 days Rinse mouth after use  10 vial 0    busPIRone (BUSPAR) 10 mg tablet TAKE ONE TABLET BY MOUTH THREE TIMES DAILY  (Patient not taking: Reported on 9/11/2020) 90 tablet 0    cholestyramine sugar free (QUESTRAN LIGHT) 4 g packet Take 1 packet (4 g total) by mouth 2 (two) times a day (Patient not taking: Reported on 9/11/2020) 60 packet 1    finasteride (PROSCAR) 5 mg tablet Take 5 mg by mouth every morning        formoterol (PERFOROMIST) 20 MCG/2ML nebulizer solution Take 20 mcg by nebulization 2 (two) times a day      pantoprazole (PROTONIX) 40 mg tablet Take 1 tablet (40 mg total) by mouth 2 (two) times a day (Patient not taking: Reported on 9/11/2020) 180 tablet 1    triamterene-hydrochlorothiazide (DYAZIDE) 37 5-25 mg per capsule TAKE ONE CAPSULE BY MOUTH DAILY AS NEEDED for leg swelling (Patient not taking: Reported on 9/11/2020) 20 capsule 0    ZEMAIRA infusion        No current facility-administered medications on file prior to visit  Past Medical History:   Diagnosis Date    Anesthesia complication     Difficult to wake up    Arthritis     BPH (benign prostatic hyperplasia)     urinary frequency    Cancer (HCC)     basal cell neck, face    COPD exacerbation (HCC) 12/29/2019    Full dentures     Hiatal hernia     History of methicillin resistant staphylococcus aureus (MRSA)     10/11/2018 MRSA (nares) positive    Irritable bowel syndrome     Kidney stone     at least 7 episodes    Liver disease     Alpha 1- enzyme deficiency - diagnosed 2002   has been on weekly replacement therapy since then   Qing Pickering Pulmonary emphysema ([facility] [address] )     1/25/15  FEV1 - 2 45 liters or 59% of predicted    RSV infection 12/2017    Wears glasses     for driving only       Past Surgical History:   Procedure Laterality Date    BACK SURGERY  2008    discectomy    COLONOSCOPY      COLONOSCOPY N/A 3/10/2017    Procedure: Trini Harrell;  Surgeon: Raven Mccullough MD;  Location: [facility] GI LAB; Service:    Annette Beltran      removal of kidney stones    ESOPHAGOGASTRODUODENOSCOPY N/A 3/10/2017    Procedure: ESOPHAGOGASTRODUODENOSCOPY (EGD); Surgeon: Raven Mccullough MD;  Location: [facility] GI LAB; Service:     LITHOTRIPSY      NC ESOPHAGOGASTRODUODENOSCOPY TRANSORAL DIAGNOSTIC N/A 11/20/2018    Procedure: ESOPHAGOGASTRODUODENOSCOPY (EGD); Surgeon: Raven Mccullough MD;  Location: [facility] GI LAB; Service: Gastroenterology    TONSILLECTOMY      VEIN LIGATION AND STRIPPING Bilateral     1980's          reports that he quit smoking about 3 years ago  He has a 60 00 pack-year smoking history  He has never used smokeless tobacco  He reports previous alcohol use  He reports that he does not use drugs  reports that he quit smoking about 3 years ago  He has a 60 00 pack-year smoking history  He has never used smokeless tobacco         Review of Systems   Constitutional: Positive for fatigue  Negative for diaphoresis and fever  HENT: Negative for congestion, facial swelling, sinus pressure and sinus pain  Respiratory: Positive for shortness of breath  Negative for stridor  Cardiovascular: Positive for palpitations  Negative for chest pain and leg swelling  Noctural   Gastrointestinal: Negative for blood in stool and rectal pain  Genitourinary: Negative for hematuria  Musculoskeletal: Positive for arthralgias  Skin: Negative for pallor  Neurological: Negative for seizures, light-headedness and headaches  Psychiatric/Behavioral: Negative for agitation and confusion          Poor sleep       Physical Exam  Vitals signs and nursing note reviewed  Constitutional:       General: He is not in acute distress  Appearance: He is well-developed  He is not toxic-appearing  Comments: Frail    o2 and mask   HENT:      Head: Normocephalic and atraumatic  Right Ear: External ear normal       Left Ear: External ear normal       Mouth/Throat:      Mouth: Mucous membranes are moist       Pharynx: No oropharyngeal exudate  Eyes:      General: No scleral icterus  Right eye: No discharge  Left eye: No discharge  Conjunctiva/sclera: Conjunctivae normal       Pupils: Pupils are equal, round, and reactive to light  Neck:      Musculoskeletal: Normal range of motion and neck supple  Trachea: No tracheal deviation  Cardiovascular:      Rate and Rhythm: Normal rate and regular rhythm  Heart sounds: No murmur  No friction rub  Pulmonary:      Effort: No respiratory distress  Breath sounds: No stridor  Wheezing present  No rales  Comments: Distant exp wheeze  Abdominal:      General: Bowel sounds are normal  There is no distension  Palpations: Abdomen is soft  Tenderness: There is no guarding or rebound  Musculoskeletal:         General: No deformity  Lymphadenopathy:      Cervical: No cervical adenopathy  Skin:     General: Skin is warm and dry  Capillary Refill: Capillary refill takes 2 to 3 seconds  Findings: No rash  Comments: AK   Neurological:      General: No focal deficit present  Mental Status: He is alert and oriented to person, place, and time  Cranial Nerves: No cranial nerve deficit  Motor: No abnormal muscle tone  Coordination: Coordination normal    Psychiatric:         Behavior: Behavior normal          Thought Content:  Thought content normal          Judgment: Judgment normal

## 2020-09-11 NOTE — PATIENT INSTRUCTIONS
Take 1/2 prednisone tablet daily 10mg  daily until pulmonary next week     robitussin 2 teaspoonful twice daily     Unisom  1 at night for sleep

## 2020-09-13 ENCOUNTER — HOSPITAL ENCOUNTER (INPATIENT)
Facility: HOSPITAL | Age: 75
LOS: 3 days | Discharge: HOME HEALTH | DRG: 312 | End: 2020-09-16
Attending: EMERGENCY MEDICINE | Admitting: FAMILY MEDICINE
Payer: MEDICARE

## 2020-09-13 ENCOUNTER — APPOINTMENT (EMERGENCY)
Dept: RADIOLOGY | Facility: HOSPITAL | Age: 75
DRG: 312 | End: 2020-09-13
Payer: MEDICARE

## 2020-09-13 DIAGNOSIS — I10 ORTHOSTATIC HYPERTENSION: ICD-10-CM

## 2020-09-13 DIAGNOSIS — I10 ESSENTIAL HYPERTENSION: ICD-10-CM

## 2020-09-13 DIAGNOSIS — J96.11 CHRONIC RESPIRATORY FAILURE WITH HYPOXIA (HCC): ICD-10-CM

## 2020-09-13 DIAGNOSIS — R26.2 AMBULATORY DYSFUNCTION: ICD-10-CM

## 2020-09-13 DIAGNOSIS — R55 SYNCOPE: Primary | ICD-10-CM

## 2020-09-13 PROBLEM — J44.9 COPD (CHRONIC OBSTRUCTIVE PULMONARY DISEASE) (HCC): Status: ACTIVE | Noted: 2019-12-29

## 2020-09-13 LAB
ALBUMIN SERPL BCP-MCNC: 3.6 G/DL (ref 3.5–5)
ALP SERPL-CCNC: 73 U/L (ref 46–116)
ALT SERPL W P-5'-P-CCNC: 121 U/L (ref 12–78)
ANION GAP SERPL CALCULATED.3IONS-SCNC: 8 MMOL/L (ref 4–13)
APTT PPP: 25 SECONDS (ref 23–37)
AST SERPL W P-5'-P-CCNC: 56 U/L (ref 5–45)
BASOPHILS NFR BLD AUTO: 1 % (ref 0–1)
BILIRUB SERPL-MCNC: 1.1 MG/DL (ref 0.2–1)
BUN SERPL-MCNC: 23 MG/DL (ref 5–25)
CALCIUM SERPL-MCNC: 8.6 MG/DL (ref 8.3–10.1)
CHLORIDE SERPL-SCNC: 104 MMOL/L (ref 100–108)
CO2 SERPL-SCNC: 26 MMOL/L (ref 21–32)
CREAT SERPL-MCNC: 1.27 MG/DL (ref 0.6–1.3)
EOSINOPHIL NFR BLD AUTO: 1 % (ref 0–6)
ERYTHROCYTE [DISTWIDTH] IN BLOOD BY AUTOMATED COUNT: 13 % (ref 11.6–15.1)
GFR SERPL CREATININE-BSD FRML MDRD: 55 ML/MIN/1.73SQ M
GLUCOSE SERPL-MCNC: 144 MG/DL (ref 65–140)
GLUCOSE SERPL-MCNC: 160 MG/DL (ref 65–140)
HCT VFR BLD AUTO: 41.8 % (ref 36.5–49.3)
HGB BLD-MCNC: 14.2 G/DL (ref 12–17)
IMM GRANULOCYTES NFR BLD AUTO: 1 % (ref 0–2)
INR PPP: 1.03 (ref 0.84–1.19)
LYMPHOCYTES NFR BLD AUTO: 11 % (ref 14–44)
MCH RBC QN AUTO: 32.9 PG (ref 26.8–34.3)
MCHC RBC AUTO-ENTMCNC: 34 G/DL (ref 31.4–37.4)
MCV RBC AUTO: 97 FL (ref 82–98)
MONOCYTES NFR BLD AUTO: 5 % (ref 4–12)
NEUTS SEG NFR BLD AUTO: 81 % (ref 43–75)
NRBC BLD AUTO-RTO: 0 /100 WBCS
PLATELET # BLD AUTO: 146 THOUSANDS/UL (ref 149–390)
PMV BLD AUTO: 9.4 FL (ref 8.9–12.7)
POTASSIUM SERPL-SCNC: 4.1 MMOL/L (ref 3.5–5.3)
PROT SERPL-MCNC: 6.7 G/DL (ref 6.4–8.2)
PROTHROMBIN TIME: 13.4 SECONDS (ref 11.6–14.5)
RBC # BLD AUTO: 4.32 MILLION/UL (ref 3.88–5.62)
SODIUM SERPL-SCNC: 138 MMOL/L (ref 136–145)
TROPONIN I SERPL-MCNC: <0.02 NG/ML
WBC # BLD AUTO: 8.14 THOUSAND/UL (ref 4.31–10.16)

## 2020-09-13 PROCEDURE — 94640 AIRWAY INHALATION TREATMENT: CPT

## 2020-09-13 PROCEDURE — 70496 CT ANGIOGRAPHY HEAD: CPT

## 2020-09-13 PROCEDURE — 80053 COMPREHEN METABOLIC PANEL: CPT | Performed by: EMERGENCY MEDICINE

## 2020-09-13 PROCEDURE — 93005 ELECTROCARDIOGRAM TRACING: CPT

## 2020-09-13 PROCEDURE — 99285 EMERGENCY DEPT VISIT HI MDM: CPT | Performed by: EMERGENCY MEDICINE

## 2020-09-13 PROCEDURE — 99285 EMERGENCY DEPT VISIT HI MDM: CPT

## 2020-09-13 PROCEDURE — 70498 CT ANGIOGRAPHY NECK: CPT

## 2020-09-13 PROCEDURE — 85025 COMPLETE CBC W/AUTO DIFF WBC: CPT | Performed by: EMERGENCY MEDICINE

## 2020-09-13 PROCEDURE — 82948 REAGENT STRIP/BLOOD GLUCOSE: CPT

## 2020-09-13 PROCEDURE — 84484 ASSAY OF TROPONIN QUANT: CPT | Performed by: EMERGENCY MEDICINE

## 2020-09-13 PROCEDURE — 94664 DEMO&/EVAL PT USE INHALER: CPT

## 2020-09-13 PROCEDURE — G1004 CDSM NDSC: HCPCS

## 2020-09-13 PROCEDURE — 85610 PROTHROMBIN TIME: CPT | Performed by: EMERGENCY MEDICINE

## 2020-09-13 PROCEDURE — 85730 THROMBOPLASTIN TIME PARTIAL: CPT | Performed by: EMERGENCY MEDICINE

## 2020-09-13 PROCEDURE — 36415 COLL VENOUS BLD VENIPUNCTURE: CPT | Performed by: EMERGENCY MEDICINE

## 2020-09-13 PROCEDURE — 94760 N-INVAS EAR/PLS OXIMETRY 1: CPT

## 2020-09-13 RX ORDER — GABAPENTIN 300 MG/1
300 CAPSULE ORAL 3 TIMES DAILY
Status: DISCONTINUED | OUTPATIENT
Start: 2020-09-13 | End: 2020-09-16 | Stop reason: HOSPADM

## 2020-09-13 RX ORDER — ACETAMINOPHEN 325 MG/1
650 TABLET ORAL EVERY 6 HOURS PRN
Status: CANCELLED | OUTPATIENT
Start: 2020-09-13

## 2020-09-13 RX ORDER — FLUTICASONE PROPIONATE 50 MCG
2 SPRAY, SUSPENSION (ML) NASAL DAILY
Status: DISCONTINUED | OUTPATIENT
Start: 2020-09-14 | End: 2020-09-16 | Stop reason: HOSPADM

## 2020-09-13 RX ORDER — BUDESONIDE 0.5 MG/2ML
0.5 INHALANT ORAL 2 TIMES DAILY
Status: CANCELLED | OUTPATIENT
Start: 2020-09-13

## 2020-09-13 RX ORDER — IPRATROPIUM BROMIDE AND ALBUTEROL SULFATE 2.5; .5 MG/3ML; MG/3ML
3 SOLUTION RESPIRATORY (INHALATION) 4 TIMES DAILY
Status: DISCONTINUED | OUTPATIENT
Start: 2020-09-13 | End: 2020-09-16 | Stop reason: HOSPADM

## 2020-09-13 RX ORDER — IPRATROPIUM BROMIDE AND ALBUTEROL SULFATE 2.5; .5 MG/3ML; MG/3ML
3 SOLUTION RESPIRATORY (INHALATION) 4 TIMES DAILY
Status: CANCELLED | OUTPATIENT
Start: 2020-09-13

## 2020-09-13 RX ORDER — BUDESONIDE 0.5 MG/2ML
0.5 INHALANT ORAL 2 TIMES DAILY
Status: DISCONTINUED | OUTPATIENT
Start: 2020-09-13 | End: 2020-09-16 | Stop reason: HOSPADM

## 2020-09-13 RX ORDER — GABAPENTIN 300 MG/1
300 CAPSULE ORAL 3 TIMES DAILY
Status: CANCELLED | OUTPATIENT
Start: 2020-09-13

## 2020-09-13 RX ORDER — AMLODIPINE BESYLATE 10 MG/1
10 TABLET ORAL DAILY
Status: DISCONTINUED | OUTPATIENT
Start: 2020-09-14 | End: 2020-09-14

## 2020-09-13 RX ORDER — CHOLESTYRAMINE LIGHT 4 G/5.7G
4 POWDER, FOR SUSPENSION ORAL 2 TIMES DAILY
Status: DISCONTINUED | OUTPATIENT
Start: 2020-09-13 | End: 2020-09-16 | Stop reason: HOSPADM

## 2020-09-13 RX ORDER — FAMOTIDINE 20 MG/1
20 TABLET, FILM COATED ORAL DAILY
Status: DISCONTINUED | OUTPATIENT
Start: 2020-09-14 | End: 2020-09-16 | Stop reason: HOSPADM

## 2020-09-13 RX ORDER — FORMOTEROL FUMARATE 20 UG/2ML
20 SOLUTION RESPIRATORY (INHALATION) 2 TIMES DAILY
Status: CANCELLED | OUTPATIENT
Start: 2020-09-13

## 2020-09-13 RX ORDER — GUAIFENESIN 100 MG/5ML
200 SOLUTION ORAL 2 TIMES DAILY
Status: DISCONTINUED | OUTPATIENT
Start: 2020-09-13 | End: 2020-09-16 | Stop reason: HOSPADM

## 2020-09-13 RX ORDER — FORMOTEROL FUMARATE 20 UG/2ML
20 SOLUTION RESPIRATORY (INHALATION) 2 TIMES DAILY
Status: DISCONTINUED | OUTPATIENT
Start: 2020-09-13 | End: 2020-09-16 | Stop reason: HOSPADM

## 2020-09-13 RX ORDER — TRAZODONE HYDROCHLORIDE 50 MG/1
50 TABLET ORAL
Status: CANCELLED | OUTPATIENT
Start: 2020-09-13

## 2020-09-13 RX ORDER — SODIUM CHLORIDE 9 MG/ML
100 INJECTION, SOLUTION INTRAVENOUS CONTINUOUS
Status: DISCONTINUED | OUTPATIENT
Start: 2020-09-13 | End: 2020-09-14

## 2020-09-13 RX ORDER — AMLODIPINE BESYLATE 5 MG/1
10 TABLET ORAL DAILY
Status: CANCELLED | OUTPATIENT
Start: 2020-09-14

## 2020-09-13 RX ORDER — FINASTERIDE 5 MG/1
5 TABLET, FILM COATED ORAL EVERY MORNING
Status: CANCELLED | OUTPATIENT
Start: 2020-09-14

## 2020-09-13 RX ORDER — TAMSULOSIN HYDROCHLORIDE 0.4 MG/1
0.4 CAPSULE ORAL DAILY
COMMUNITY
End: 2021-08-26

## 2020-09-13 RX ORDER — PREDNISONE 20 MG/1
20 TABLET ORAL DAILY
Status: CANCELLED | OUTPATIENT
Start: 2020-09-14 | End: 2020-09-16

## 2020-09-13 RX ORDER — BUSPIRONE HYDROCHLORIDE 5 MG/1
10 TABLET ORAL 3 TIMES DAILY
Status: CANCELLED | OUTPATIENT
Start: 2020-09-13

## 2020-09-13 RX ORDER — BUSPIRONE HYDROCHLORIDE 10 MG/1
10 TABLET ORAL 3 TIMES DAILY
Status: DISCONTINUED | OUTPATIENT
Start: 2020-09-13 | End: 2020-09-16 | Stop reason: HOSPADM

## 2020-09-13 RX ORDER — PREDNISONE 10 MG/1
10 TABLET ORAL DAILY
Status: DISCONTINUED | OUTPATIENT
Start: 2020-09-14 | End: 2020-09-16 | Stop reason: HOSPADM

## 2020-09-13 RX ORDER — PANTOPRAZOLE SODIUM 40 MG/1
40 TABLET, DELAYED RELEASE ORAL 2 TIMES DAILY
Status: DISCONTINUED | OUTPATIENT
Start: 2020-09-13 | End: 2020-09-16 | Stop reason: HOSPADM

## 2020-09-13 RX ORDER — TRIAMTERENE AND HYDROCHLOROTHIAZIDE 37.5; 25 MG/1; MG/1
TABLET ORAL
Status: CANCELLED | OUTPATIENT
Start: 2020-09-13

## 2020-09-13 RX ORDER — FLUTICASONE PROPIONATE 50 MCG
2 SPRAY, SUSPENSION (ML) NASAL DAILY
Status: CANCELLED | OUTPATIENT
Start: 2020-09-14

## 2020-09-13 RX ORDER — TAMSULOSIN HYDROCHLORIDE 0.4 MG/1
0.4 CAPSULE ORAL
Status: CANCELLED | OUTPATIENT
Start: 2020-09-14

## 2020-09-13 RX ORDER — TRAZODONE HYDROCHLORIDE 50 MG/1
50 TABLET ORAL
Status: DISCONTINUED | OUTPATIENT
Start: 2020-09-13 | End: 2020-09-14

## 2020-09-13 RX ORDER — PANTOPRAZOLE SODIUM 40 MG/1
40 TABLET, DELAYED RELEASE ORAL 2 TIMES DAILY
Status: CANCELLED | OUTPATIENT
Start: 2020-09-13

## 2020-09-13 RX ORDER — PREDNISONE 10 MG/1
10 TABLET ORAL DAILY
COMMUNITY
End: 2020-09-16 | Stop reason: HOSPADM

## 2020-09-13 RX ORDER — FAMOTIDINE 20 MG/1
20 TABLET, FILM COATED ORAL DAILY
Status: CANCELLED | OUTPATIENT
Start: 2020-09-14

## 2020-09-13 RX ORDER — LORAZEPAM 1 MG/1
1 TABLET ORAL 2 TIMES DAILY
Status: CANCELLED | OUTPATIENT
Start: 2020-09-13

## 2020-09-13 RX ORDER — ACETAMINOPHEN 325 MG/1
650 TABLET ORAL EVERY 6 HOURS PRN
Status: DISCONTINUED | OUTPATIENT
Start: 2020-09-13 | End: 2020-09-16 | Stop reason: HOSPADM

## 2020-09-13 RX ORDER — DOXYLAMINE SUCCINATE 25 MG/1
25 TABLET ORAL
COMMUNITY
End: 2022-05-11 | Stop reason: ALTCHOICE

## 2020-09-13 RX ADMIN — TRAZODONE HYDROCHLORIDE 50 MG: 50 TABLET ORAL at 22:56

## 2020-09-13 RX ADMIN — CHOLESTYRAMINE 4 G: 4 POWDER, FOR SUSPENSION ORAL at 22:56

## 2020-09-13 RX ADMIN — IOHEXOL 85 ML: 350 INJECTION, SOLUTION INTRAVENOUS at 17:09

## 2020-09-13 RX ADMIN — PANTOPRAZOLE SODIUM 40 MG: 40 TABLET, DELAYED RELEASE ORAL at 22:56

## 2020-09-13 RX ADMIN — GABAPENTIN 300 MG: 300 CAPSULE ORAL at 22:55

## 2020-09-13 RX ADMIN — IPRATROPIUM BROMIDE AND ALBUTEROL SULFATE 3 ML: 2.5; .5 SOLUTION RESPIRATORY (INHALATION) at 22:28

## 2020-09-13 RX ADMIN — BUDESONIDE 0.5 MG: 0.5 INHALANT RESPIRATORY (INHALATION) at 22:28

## 2020-09-13 RX ADMIN — GUAIFENESIN 200 MG: 100 SOLUTION ORAL at 22:56

## 2020-09-13 RX ADMIN — SODIUM CHLORIDE 100 ML/HR: 0.9 INJECTION, SOLUTION INTRAVENOUS at 23:15

## 2020-09-13 RX ADMIN — FORMOTEROL FUMARATE DIHYDRATE 20 MCG: 20 SOLUTION RESPIRATORY (INHALATION) at 22:43

## 2020-09-13 RX ADMIN — BUSPIRONE HYDROCHLORIDE 10 MG: 10 TABLET ORAL at 22:55

## 2020-09-13 NOTE — ED PROVIDER NOTES
History  Chief Complaint   Patient presents with    Syncope     Pt arrived via medics from home  Sts he went outside onto porch and passed out  Has had dizziness for a long time  Skin tear to rt elbow  Continues with dizziness  68 yo male says he was fine, sitting outside on porch, got up to walk inside and suddenly felt faint  [place] like everything was going dark and he passed out  [place]ignon Rust to the ground  Neighbor heard the thud and came over  He tried to call 911 but didn't know how to work his phone at first so neighbor had to call  He denies chest pain or headache  No neck pain  + sob but has bad COPD and on O2 all the time  No fever  + cough  History provided by:  Patient   used: No    Syncope   Associated symptoms: shortness of breath    Associated symptoms: no chest pain, no confusion, no dizziness, no fever, no headaches, no nausea and no vomiting        Prior to Admission Medications   Prescriptions Last Dose Informant Patient Reported? Taking? LORazepam (ATIVAN) 1 mg tablet  Pharmacy (Specify) No Yes   Sig: TAKE ONE TABLET BY MOUTH TWICE DAILY    OXYGEN-HELIUM IN  Pharmacy (Specify) Yes Yes   Sig: Inhale 2L at rest- 3L with activity   VENTOLIN  (90 Base) MCG/ACT inhaler  Pharmacy (Specify) Yes Yes   ZEMAIRA infusion  Pharmacy (Specify) Yes Yes   amLODIPine (NORVASC) 10 mg tablet  Pharmacy (Specify) No Yes   Sig: Take 1 tablet (10 mg total) by mouth daily   budesonide (PULMICORT) 0 5 mg/2 mL nebulizer solution  Self No Yes   Sig: Take 1 vial (0 5 mg total) by nebulization 2 (two) times a day for 5 days Rinse mouth after use     busPIRone (BUSPAR) 10 mg tablet  Pharmacy (Specify) No Yes   Sig: TAKE ONE TABLET BY MOUTH THREE TIMES DAILY    cholestyramine sugar free (QUESTRAN LIGHT) 4 g packet  Pharmacy (Specify) No Yes   Sig: Take 1 packet (4 g total) by mouth 2 (two) times a day   famotidine (PEPCID) 20 mg tablet  Pharmacy (Specify) No Yes   Sig: Take 1 tablet (20 mg total) by mouth daily   finasteride (PROSCAR) 5 mg tablet  Pharmacy (Specify) Yes Yes   Sig: Take 5 mg by mouth every morning     fluticasone (FLONASE) 50 mcg/act nasal spray  Pharmacy (Specify) No Yes   Si sprays into each nostril daily   formoterol (PERFOROMIST) 20 MCG/2ML nebulizer solution  Pharmacy (Specify) Yes Yes   Sig: Take 20 mcg by nebulization 2 (two) times a day   gabapentin (NEURONTIN) 300 mg capsule  Pharmacy (Specify) No Yes   Sig: TAKE ONE CAPSULE BY MOUTH THREE TIMES DAILY    ipratropium-albuterol (DUO-NEB) 0 5-2 5 mg/3 mL nebulizer solution  Pharmacy (Specify) No Yes   Sig: Take 1 vial (3 mL total) by nebulization 4 (four) times a day   pantoprazole (PROTONIX) 40 mg tablet  Pharmacy (Specify) No Yes   Sig: Take 1 tablet (40 mg total) by mouth 2 (two) times a day   predniSONE 20 mg tablet  Pharmacy (Specify) No Yes   Sig: Take 1 tablet (20 mg total) by mouth daily for 20 days   tamsulosin (FLOMAX) 0 4 mg  Pharmacy (Specify) No Yes   Sig: Half a tablet daily   traZODone (DESYREL) 50 mg tablet  Pharmacy (Specify) No Yes   Sig: TAKE ONE TABLET BY MOUTH NIGHTLY AT BEDTIME    triamterene-hydrochlorothiazide (DYAZIDE) 37 5-25 mg per capsule  Pharmacy (Specify) No Yes   Sig: TAKE ONE CAPSULE BY MOUTH DAILY AS NEEDED for leg swelling      Facility-Administered Medications: None       Past Medical History:   Diagnosis Date    Anesthesia complication     Difficult to wake up    Arthritis     BPH (benign prostatic hyperplasia)     urinary frequency    Cancer (HCC)     basal cell neck, face    COPD exacerbation ([facility] ) 2019    Full dentures     Hiatal hernia     History of methicillin resistant staphylococcus aureus (MRSA)     10/11/2018 MRSA (nares) positive    Irritable bowel syndrome     Kidney stone     at least 7 episodes    Liver disease     Alpha 1- enzyme deficiency - diagnosed    has been on weekly replacement therapy since then    Pulmonary emphysema ([facility] )     1/25/15 FEV1 - 2 45 liters or 59% of predicted    RSV infection 12/2017    Wears glasses     for driving only       Past Surgical History:   Procedure Laterality Date    BACK SURGERY  2008    discectomy    COLONOSCOPY      COLONOSCOPY N/A 3/10/2017    Procedure: Zackery Nones;  Surgeon: Yadira Barrera MD;  Location: [place] [address] GI LAB; Service:    Mckoy Lifloridalma      removal of kidney stones    ESOPHAGOGASTRODUODENOSCOPY N/A 3/10/2017    Procedure: ESOPHAGOGASTRODUODENOSCOPY (EGD); Surgeon: Yadira Barrera MD;  Location: [facility] GI LAB; Service:     LITHOTRIPSY      AL ESOPHAGOGASTRODUODENOSCOPY TRANSORAL DIAGNOSTIC N/A 11/20/2018    Procedure: ESOPHAGOGASTRODUODENOSCOPY (EGD); Surgeon: Yadira Barrera MD;  Location: [facility] GI LAB; Service: Gastroenterology    TONSILLECTOMY      VEIN LIGATION AND STRIPPING Bilateral     17's       Family History   Problem Relation Age of Onset    Emphysema Mother         never smoked    Emphysema Father     Cancer Brother         colon    Colon cancer Brother     Ulcerative colitis Family     Liver disease Family      I have reviewed and agree with the history as documented  E-Cigarette/Vaping    E-Cigarette Use Never User      E-Cigarette/Vaping Substances     Social History     Tobacco Use    Smoking status: Former Smoker     Packs/day: 1 00     Years: 60 00     Pack years: 60 00     Last attempt to quit: 8/31/2017     Years since quitting: 3 0    Smokeless tobacco: Never Used    Tobacco comment: quit in august 2017   Substance Use Topics    Alcohol use: Not Currently     Frequency: Never     Comment: stopped in 2009    Drug use: No       Review of Systems   Constitutional: Negative  Negative for chills and fever  HENT: Negative  Negative for congestion and sore throat  Eyes: Negative  Respiratory: Positive for cough and shortness of breath  Cardiovascular: Positive for syncope  Negative for chest pain and leg swelling  Gastrointestinal: Negative  Negative for abdominal pain, diarrhea, nausea and vomiting  Genitourinary: Negative  Negative for dysuria, flank pain and hematuria  Musculoskeletal: Negative  Negative for back pain and myalgias  Skin: Negative  Negative for rash and wound  Neurological: Positive for light-headedness  Negative for dizziness and headaches  Psychiatric/Behavioral: Negative  Negative for confusion and hallucinations  The patient is not nervous/anxious  All other systems reviewed and are negative  Physical Exam  Physical Exam  Vitals signs and nursing note reviewed  Constitutional:       General: He is not in acute distress  Appearance: He is well-developed  He is ill-appearing and diaphoretic  HENT:      Head: Normocephalic and atraumatic  Eyes:      General: No scleral icterus  Extraocular Movements: Extraocular movements intact  Conjunctiva/sclera: Conjunctivae normal       Pupils: Pupils are equal, round, and reactive to light  Neck:      Musculoskeletal: Normal range of motion and neck supple  Comments: c-spine not tender  Cardiovascular:      Rate and Rhythm: Normal rate and regular rhythm  Heart sounds: Normal heart sounds  No murmur  Pulmonary:      Effort: Pulmonary effort is normal  No respiratory distress  Breath sounds: Decreased air movement present  Chest:      Chest wall: No tenderness  Abdominal:      General: Bowel sounds are normal  There is no distension  Palpations: Abdomen is soft  Tenderness: There is no abdominal tenderness  Musculoskeletal: Normal range of motion  General: No tenderness or deformity  Right lower leg: No edema  Left lower leg: No edema  Skin:     General: Skin is warm  Coloration: Skin is pale  Findings: No erythema or rash  Neurological:      General: No focal deficit present  Mental Status: He is alert and oriented to person, place, and time        Cranial Nerves: No cranial nerve deficit  Comments: Speech intact, no drift, no droop  Finger to nose intact but both sides a little tremulous;  equal   Left leg lift a little weaker than right but pt  Says that is normal for him     Psychiatric:         Mood and Affect: Mood normal          Behavior: Behavior normal          Vital Signs  ED Triage Vitals   Temperature Pulse Respirations Blood Pressure SpO2   09/13/20 1559 09/13/20 1559 09/13/20 1559 09/13/20 1559 09/13/20 1559   97 8 °F (36 6 °C) 86 (!) 24 167/79 93 %      Temp src Heart Rate Source Patient Position - Orthostatic VS BP Location FiO2 (%)   -- 09/13/20 1821 09/13/20 1821 09/13/20 1821 --    Monitor Lying Right arm       Pain Score       --                  Vitals:    09/13/20 1700 09/13/20 1730 09/13/20 1821 09/13/20 1830   BP: 163/74  167/80 167/80   Pulse: 78 74 74 74   Patient Position - Orthostatic VS:   Lying Lying         Visual Acuity  Visual Acuity      Most Recent Value   L Pupil Size (mm)  3   R Pupil Size (mm)  3          ED Medications  Medications   iohexol (OMNIPAQUE) 350 MG/ML injection (MULTI-DOSE) 85 mL (85 mL Intravenous Given 9/13/20 1709)       Diagnostic Studies  Results Reviewed     Procedure Component Value Units Date/Time    CBC and differential [[id number]]  (Abnormal) Collected:  09/13/20 1603    Lab Status:  Final result Specimen:  Blood from Arm, Left Updated:  09/13/20 1639     WBC 8 14 Thousand/uL      RBC 4 32 Million/uL      Hemoglobin 14 2 g/dL      Hematocrit 41 8 %      MCV 97 fL      MCH 32 9 pg      MCHC 34 0 g/dL      RDW 13 0 %      MPV 9 4 fL      Platelets 346 Thousands/uL      nRBC 0 /100 WBCs      Neutrophils Relative 81 %      Immat GRANS % 1 %      Lymphocytes Relative 11 %      Monocytes Relative 5 %      Eosinophils Relative 1 %      Basophils Relative 1 %     Comprehensive metabolic panel [[id number]]  (Abnormal) Collected:  09/13/20 1603    Lab Status:  Final result Specimen:  Blood from Arm, Left Updated:  09/13/20 1628 Sodium 138 mmol/L      Potassium 4 1 mmol/L      Chloride 104 mmol/L      CO2 26 mmol/L      ANION GAP 8 mmol/L      BUN 23 mg/dL      Creatinine 1 27 mg/dL      Glucose 160 mg/dL      Calcium 8 6 mg/dL      AST 56 U/L       U/L      Alkaline Phosphatase 73 U/L      Total Protein 6 7 g/dL      Albumin 3 6 g/dL      Total Bilirubin 1 10 mg/dL      eGFR 55 ml/min/1 73sq m     Narrative:       [place] guidelines for Chronic Kidney Disease (CKD):     Stage 1 with normal or high GFR (GFR > 90 mL/min/1 73 square meters)    Stage 2 Mild CKD (GFR = 60-89 mL/min/1 73 square meters)    Stage 3A Moderate CKD (GFR = 45-59 mL/min/1 73 square meters)    Stage 3B Moderate CKD (GFR = 30-44 mL/min/1 73 square meters)    Stage 4 Severe CKD (GFR = 15-29 mL/min/1 73 square meters)    Stage 5 End Stage CKD (GFR <15 mL/min/1 73 square meters)  Note: GFR calculation is accurate only with a steady state creatinine    Troponin I [[id number]]  (Normal) Collected:  09/13/20 1603    Lab Status:  Final result Specimen:  Blood from Arm, Left Updated:  09/13/20 1628     Troponin I <0 02 ng/mL     Protime-INR [[id number]]  (Normal) Collected:  09/13/20 1603    Lab Status:  Final result Specimen:  Blood from Arm, Left Updated:  09/13/20 1619     Protime 13 4 seconds      INR 1 03    APTT [[id number]]  (Normal) Collected:  09/13/20 1603    Lab Status:  Final result Specimen:  Blood from Arm, Left Updated:  09/13/20 1619     PTT 25 seconds     Fingerstick Glucose (POCT) [[id number]]  (Abnormal) Collected:  09/13/20 1555    Lab Status:  Final result Updated:  09/13/20 1556     POC Glucose 144 mg/dl     UA (URINE) with reflex to Scope [[id number]]     Lab Status:  No result Specimen:  Urine                  CTA head and neck with and without contrast   Final Result by Mary Bell MD (09/13 1821)         1  No evidence of acute intracranial hemorrhage     2  Approximately 68% atherosclerotic plaque stenosis proximal left cervical ICA  3  Right upper lobe 3 mm lung nodule, follow-up unenhanced chest CT in 12 months  Workstation performed: [id number]                    Procedures  ECG 12 Lead Documentation Only    Date/Time: 9/13/2020 4:01 PM  Performed by: Trenton Balderrama MD  Authorized by: Trenton Balderrama MD     Indications / Diagnosis:  Syncope  ECG reviewed by me, the ED Provider: yes    Patient location:  ED  Previous ECG:     Previous ECG:  Compared to current    Similarity:  Changes noted  Interpretation:     Interpretation: abnormal    Rate:     ECG rate:  85    ECG rate assessment: normal    Rhythm:     Rhythm: sinus rhythm    Ectopy:     Ectopy: none    QRS:     QRS axis:  Normal  Conduction:     Conduction: normal    ST segments:     ST segments:  Abnormal    Depression:  V2 and V3  T waves:     T waves: normal               ED Course                           SBIRT 20yo+      Most Recent Value   SBIRT (24 yo +)   In order to provide better care to our patients, we are screening all of our patients for alcohol and drug use  Would it be okay to ask you these screening questions? Yes Filed at: 09/13/2020 1612   Initial Alcohol Screen: US AUDIT-C    1  How often do you have a drink containing alcohol?  0 Filed at: 09/13/2020 1612   2  How many drinks containing alcohol do you have on a typical day you are drinking? 0 Filed at: 09/13/2020 1612   3a  Male UNDER 65: How often do you have five or more drinks on one occasion? 0 Filed at: 09/13/2020 1612   3b  FEMALE Any Age, or MALE 65+: How often do you have 4 or more drinks on one occassion? 0 Filed at: 09/13/2020 1612   Audit-C Score  0 Filed at: 09/13/2020 1612   DIANE: How many times in the past year have you    Used an illegal drug or used a prescription medication for non-medical reasons?   Never Filed at: 09/13/2020 1612                  MDM  Number of Diagnoses or Management Options  Diagnosis management comments: Discussed with Dr Gris Yoon, will admit       Disposition  Final diagnoses:   Syncope     Time reflects when diagnosis was documented in both MDM as applicable and the Disposition within this note     Time User Action Codes Description Comment    7/69/7452  7:99 PM Ayo Townsend Add [E80] Syncope       ED Disposition     ED Disposition Condition Date/Time Comment    Admit Stable Sun Sep 13, 2020  6:43 PM Case was discussed with **Dr Saurabh Preciado* and the patient's admission status was agreed to be Admission Status: inpatient status         Follow-up Information    None         Patient's Medications   Discharge Prescriptions    No medications on file     No discharge procedures on file      PDMP Review       Value Time User    PDMP Reviewed  Yes 8/12/2020  8:09 AM Kirby Tobias MD          ED Provider  Electronically Signed by           Meghan Riggs MD  30/42/18 [id number]

## 2020-09-13 NOTE — H&P
H&P Exam - Navya Urias 67 y o  male MRN: [id number]    Unit/Bed#: 2 [place] [address] Encounter: [id number]      Assessment/Plan     Assessment:  67 yr old M with hx of COPD, htn presents with syncope possibly due to vasovagal  Pt was seen and examined at bedside, determined to require inpatient treatment  Pt will be admitted to Dr Martha Alonso service for more than 2 midnights  Plan:   * Syncope  Assessment & Plan  Pt had 1x syncopal episode today  EKG- normal sinus rhythm   BP - 181/9   CT head and neck w/o contrast - No evidence of acute intracranial hemorrhage  Kingsley Rosario Approximately 68% atherosclerotic plaque stenosis proximal left cervical ICA     - Telemetry   - orthostatic vitals   - 100 cc/hr NS    COPD (chronic obstructive pulmonary disease) ([facility])  Assessment & Plan  History of COPD  Currently stable    Continue home medications:  DuoNebs, Flonase 50 mcg, performist, prednisone 10 mg daily, pulmicort    Insomnia  Assessment & Plan  Hx of insomnia currently taking ativan 1mg and trazadone        Hold ativan 1mg   Hold doxylamine 25mg   Continue trazadone 50mg     BPH (benign prostatic hyperplasia)  Assessment & Plan  History of BPH  Currently stable  Hold tamsulosin 0 2mg daily  Hold finestride 5mg       Essential hypertension  Assessment & Plan  History of  Hypertension  BP today ranging 160s-180s /70s-80s    Continue amlodipine 10 mg p o  Daily  Hold  triamterene-hydrochlorothiazide 37 5-25 mg daily  Monitor vital signs    Gastroesophageal reflux disease  Assessment & Plan  Hx of GERD  Currently stable     Continue pepcid 20mg   Continue 40mg PO     Global: SQL for DVT prophylaxis, 100cc/hr NS for IV hydration, Regular diet for nutrition, Acetaminophen 650mg as needed PRN for headaches and fever  History of Present Illness     HPI:  Navya Urias is a 67 y o  male history of COPD, hypertension presents the ED after a syncope episode      Patient states he was sitting on his porch got up to go inside suddenly felt faint and a pressure in his head  Rayle everything going dark and passed out  Patient states that he fell on his side but does not recall hitting his head  He remembers waxing and waning consciousness  Tried to call 911 but was too weak to use the phone  States that neighbor heard him fall and came to help  Patient has been feeling lower extremity weakness lately but states that today he fell because felt light headed not because lost balance or weakness  Denies prior occurrence  Patient  still currently feeling light headed but not as he did before  Patient denies fevers, chills, headaches, chest pain, shortness of breath, joint pain  PCP: Gareth Garrido MD    Review of Systems   Constitutional: Negative for chills and fever  Respiratory: Negative for cough, chest tightness, shortness of breath and wheezing  Cardiovascular: Negative for chest pain and palpitations  Gastrointestinal: Negative for abdominal pain  Musculoskeletal: Negative for arthralgias and back pain  Neurological: Positive for syncope  Negative for dizziness, light-headedness, numbness and headaches  Historical Information   Past Medical History:   Diagnosis Date    Anesthesia complication     Difficult to wake up    Arthritis     BPH (benign prostatic hyperplasia)     urinary frequency    Cancer (HCC)     basal cell neck, face    COPD exacerbation ([facility] ) 12/29/2019    Full dentures     Hiatal hernia     History of methicillin resistant staphylococcus aureus (MRSA)     10/11/2018 MRSA (nares) positive    Irritable bowel syndrome     Kidney stone     at least 7 episodes    Liver disease     Alpha 1- enzyme deficiency - diagnosed 2002   has been on weekly replacement therapy since then    Pulmonary emphysema ([facility] )     1/25/15  FEV1 - 2 45 liters or 59% of predicted    RSV infection 12/2017    Wears glasses     for driving only     Past Surgical History:   Procedure Laterality Date    BACK SURGERY  2008 discectomy    COLONOSCOPY      COLONOSCOPY N/A 3/10/2017    Procedure: Pretty Dill;  Surgeon: Mitra Cabrales MD;  Location: [place] [address] GI LAB; Service:    Caitlin Fresh      removal of kidney stones    ESOPHAGOGASTRODUODENOSCOPY N/A 3/10/2017    Procedure: ESOPHAGOGASTRODUODENOSCOPY (EGD); Surgeon: Mitra Cabrales MD;  Location: [facility] GI LAB; Service:     LITHOTRIPSY      [place] ESOPHAGOGASTRODUODENOSCOPY TRANSORAL DIAGNOSTIC N/A 11/20/2018    Procedure: ESOPHAGOGASTRODUODENOSCOPY (EGD); Surgeon: Mitra Cabrales MD;  Location: [facility] GI LAB; Service: Gastroenterology    TONSILLECTOMY      VEIN LIGATION AND STRIPPING Bilateral     1980's     Social History   Social History     Substance and Sexual Activity   Alcohol Use Not Currently    Frequency: Never    Comment: stopped in 2009     Social History     Substance and Sexual Activity   Drug Use Not Currently     Social History     Tobacco Use   Smoking Status Former Smoker    Packs/day: 1 00    Years: 60 00    Pack years: 60 00    Last attempt to quit: 8/31/2017    Years since quitting: 3 0   Smokeless Tobacco Never Used   Tobacco Comment    quit in august 2017     E-Cigarette/Vaping    E-Cigarette Use Never User       E-Cigarette/Vaping Substances    Nicotine No     THC No     CBD No     Flavoring No     Other No     Unknown No      Family History:   Family History   Problem Relation Age of Onset    Emphysema Mother         never smoked    Emphysema Father     Cancer Brother         colon    Colon cancer Brother     Ulcerative colitis Family     Liver disease Family        Meds/Allergies   all medications and allergies reviewed  Allergies   Allergen Reactions    Chantix [Varenicline]      Caused prostate infection       Objective   Vitals: Blood pressure 152/67, pulse 96, temperature 97 5 °F (36 4 °C), temperature source Oral, resp  rate 19, weight 84 4 kg (186 lb), SpO2 95 %      No intake or output data in the 24 hours ending 09/13/20 2353    Invasive Devices     Peripheral Intravenous Line            Peripheral IV Left Antecubital -- days                Physical Exam  Constitutional:       Appearance: He is obese  HENT:      Head: Normocephalic and atraumatic  Cardiovascular:      Rate and Rhythm: Normal rate and regular rhythm  Pulmonary:      Effort: Pulmonary effort is normal       Breath sounds: Normal breath sounds  Abdominal:      General: Abdomen is flat  Palpations: Abdomen is soft  Neurological:      General: No focal deficit present  Mental Status: He is oriented to person, place, and time  Lab Results: I have personally reviewed pertinent reports  Imaging: I have personally reviewed pertinent reports  EKG, Pathology, and Other Studies: I have personally reviewed pertinent reports  Code Status: Level 1 - Full Code  Advance Directive and Living Will:      Power of :    POLST:      Counseling / Coordination of Care  Total floor / unit time spent today 30 minutes

## 2020-09-13 NOTE — ED NOTES
Patient is alert/oriented times four  Denies headache or blurry vision  Lungs with diminished breath sounds bilaterally  Patient wears oxygen at home,oxygen placed here at 3L/min,O2 saturation 96%  No edema noted,pulses palpable  Abraison to right upper arm,near elbow,covered with clean dry dressing  Blood sugar obtained,resulted at 144  IV HL to LAC,#18g,with IVF's infusing via gravity  No complaints of pain  Side rails are up  Call bell is in reach         Tomasa Francois, RN  09/13/20 0205

## 2020-09-14 LAB
ANION GAP SERPL CALCULATED.3IONS-SCNC: 13 MMOL/L (ref 4–13)
BASOPHILS # BLD AUTO: 0.06 THOUSANDS/ΜL (ref 0–0.1)
BASOPHILS NFR BLD AUTO: 1 % (ref 0–1)
BUN SERPL-MCNC: 17 MG/DL (ref 5–25)
CALCIUM SERPL-MCNC: 8.2 MG/DL (ref 8.3–10.1)
CHLORIDE SERPL-SCNC: 105 MMOL/L (ref 100–108)
CO2 SERPL-SCNC: 21 MMOL/L (ref 21–32)
CREAT SERPL-MCNC: 1.21 MG/DL (ref 0.6–1.3)
EOSINOPHIL # BLD AUTO: 0.26 THOUSAND/ΜL (ref 0–0.61)
EOSINOPHIL NFR BLD AUTO: 3 % (ref 0–6)
ERYTHROCYTE [DISTWIDTH] IN BLOOD BY AUTOMATED COUNT: 12.8 % (ref 11.6–15.1)
GFR SERPL CREATININE-BSD FRML MDRD: 58 ML/MIN/1.73SQ M
GLUCOSE SERPL-MCNC: 109 MG/DL (ref 65–140)
HCT VFR BLD AUTO: 42.6 % (ref 36.5–49.3)
HGB BLD-MCNC: 13.9 G/DL (ref 12–17)
IMM GRANULOCYTES # BLD AUTO: 0.06 THOUSAND/UL (ref 0–0.2)
IMM GRANULOCYTES NFR BLD AUTO: 1 % (ref 0–2)
LYMPHOCYTES # BLD AUTO: 2.37 THOUSANDS/ΜL (ref 0.6–4.47)
LYMPHOCYTES NFR BLD AUTO: 27 % (ref 14–44)
MAGNESIUM SERPL-MCNC: 2.1 MG/DL (ref 1.6–2.6)
MCH RBC QN AUTO: 32.5 PG (ref 26.8–34.3)
MCHC RBC AUTO-ENTMCNC: 32.6 G/DL (ref 31.4–37.4)
MCV RBC AUTO: 100 FL (ref 82–98)
MONOCYTES # BLD AUTO: 0.52 THOUSAND/ΜL (ref 0.17–1.22)
MONOCYTES NFR BLD AUTO: 6 % (ref 4–12)
NEUTROPHILS # BLD AUTO: 5.44 THOUSANDS/ΜL (ref 1.85–7.62)
NEUTS SEG NFR BLD AUTO: 62 % (ref 43–75)
NRBC BLD AUTO-RTO: 0 /100 WBCS
PHOSPHATE SERPL-MCNC: 3 MG/DL (ref 2.3–4.1)
PLATELET # BLD AUTO: 114 THOUSANDS/UL (ref 149–390)
PMV BLD AUTO: 10.2 FL (ref 8.9–12.7)
POTASSIUM SERPL-SCNC: 3.9 MMOL/L (ref 3.5–5.3)
RBC # BLD AUTO: 4.28 MILLION/UL (ref 3.88–5.62)
SODIUM SERPL-SCNC: 139 MMOL/L (ref 136–145)
WBC # BLD AUTO: 8.71 THOUSAND/UL (ref 4.31–10.16)

## 2020-09-14 PROCEDURE — 94640 AIRWAY INHALATION TREATMENT: CPT

## 2020-09-14 PROCEDURE — 84100 ASSAY OF PHOSPHORUS: CPT | Performed by: STUDENT IN AN ORGANIZED HEALTH CARE EDUCATION/TRAINING PROGRAM

## 2020-09-14 PROCEDURE — 80048 BASIC METABOLIC PNL TOTAL CA: CPT | Performed by: STUDENT IN AN ORGANIZED HEALTH CARE EDUCATION/TRAINING PROGRAM

## 2020-09-14 PROCEDURE — 99222 1ST HOSP IP/OBS MODERATE 55: CPT | Performed by: INTERNAL MEDICINE

## 2020-09-14 PROCEDURE — 97110 THERAPEUTIC EXERCISES: CPT

## 2020-09-14 PROCEDURE — 97163 PT EVAL HIGH COMPLEX 45 MIN: CPT

## 2020-09-14 PROCEDURE — 83735 ASSAY OF MAGNESIUM: CPT | Performed by: STUDENT IN AN ORGANIZED HEALTH CARE EDUCATION/TRAINING PROGRAM

## 2020-09-14 PROCEDURE — 85025 COMPLETE CBC W/AUTO DIFF WBC: CPT | Performed by: STUDENT IN AN ORGANIZED HEALTH CARE EDUCATION/TRAINING PROGRAM

## 2020-09-14 PROCEDURE — NC001 PR NO CHARGE: Performed by: FAMILY MEDICINE

## 2020-09-14 PROCEDURE — 99222 1ST HOSP IP/OBS MODERATE 55: CPT | Performed by: FAMILY MEDICINE

## 2020-09-14 PROCEDURE — 94760 N-INVAS EAR/PLS OXIMETRY 1: CPT

## 2020-09-14 RX ORDER — AMLODIPINE BESYLATE 5 MG/1
5 TABLET ORAL DAILY
Status: DISCONTINUED | OUTPATIENT
Start: 2020-09-15 | End: 2020-09-15

## 2020-09-14 RX ORDER — TRIAMTERENE AND HYDROCHLOROTHIAZIDE 37.5; 25 MG/1; MG/1
1 TABLET ORAL DAILY
Status: DISCONTINUED | OUTPATIENT
Start: 2020-09-14 | End: 2020-09-14

## 2020-09-14 RX ORDER — LORAZEPAM 1 MG/1
1 TABLET ORAL 2 TIMES DAILY
Status: DISCONTINUED | OUTPATIENT
Start: 2020-09-14 | End: 2020-09-14

## 2020-09-14 RX ORDER — LORAZEPAM 1 MG/1
1 TABLET ORAL 2 TIMES DAILY
Status: DISCONTINUED | OUTPATIENT
Start: 2020-09-14 | End: 2020-09-16

## 2020-09-14 RX ADMIN — BUSPIRONE HYDROCHLORIDE 10 MG: 10 TABLET ORAL at 15:08

## 2020-09-14 RX ADMIN — FAMOTIDINE 20 MG: 20 TABLET ORAL at 08:19

## 2020-09-14 RX ADMIN — ENOXAPARIN SODIUM 40 MG: 40 INJECTION SUBCUTANEOUS at 08:19

## 2020-09-14 RX ADMIN — PANTOPRAZOLE SODIUM 40 MG: 40 TABLET, DELAYED RELEASE ORAL at 17:54

## 2020-09-14 RX ADMIN — CHOLESTYRAMINE 4 G: 4 POWDER, FOR SUSPENSION ORAL at 17:54

## 2020-09-14 RX ADMIN — BUSPIRONE HYDROCHLORIDE 10 MG: 10 TABLET ORAL at 21:16

## 2020-09-14 RX ADMIN — GABAPENTIN 300 MG: 300 CAPSULE ORAL at 15:08

## 2020-09-14 RX ADMIN — SODIUM CHLORIDE 100 ML/HR: 0.9 INJECTION, SOLUTION INTRAVENOUS at 08:19

## 2020-09-14 RX ADMIN — AMLODIPINE BESYLATE 10 MG: 10 TABLET ORAL at 08:19

## 2020-09-14 RX ADMIN — CHOLESTYRAMINE 4 G: 4 POWDER, FOR SUSPENSION ORAL at 08:19

## 2020-09-14 RX ADMIN — GABAPENTIN 300 MG: 300 CAPSULE ORAL at 08:19

## 2020-09-14 RX ADMIN — BUSPIRONE HYDROCHLORIDE 10 MG: 10 TABLET ORAL at 08:19

## 2020-09-14 RX ADMIN — FLUTICASONE PROPIONATE 2 SPRAY: 50 SPRAY, METERED NASAL at 08:19

## 2020-09-14 RX ADMIN — LORAZEPAM 1 MG: 1 TABLET ORAL at 22:31

## 2020-09-14 RX ADMIN — FORMOTEROL FUMARATE DIHYDRATE 20 MCG: 20 SOLUTION RESPIRATORY (INHALATION) at 20:22

## 2020-09-14 RX ADMIN — BUDESONIDE 0.5 MG: 0.5 INHALANT RESPIRATORY (INHALATION) at 20:20

## 2020-09-14 RX ADMIN — BUDESONIDE 0.5 MG: 0.5 INHALANT RESPIRATORY (INHALATION) at 07:32

## 2020-09-14 RX ADMIN — IPRATROPIUM BROMIDE AND ALBUTEROL SULFATE 3 ML: 2.5; .5 SOLUTION RESPIRATORY (INHALATION) at 15:00

## 2020-09-14 RX ADMIN — GABAPENTIN 300 MG: 300 CAPSULE ORAL at 21:16

## 2020-09-14 RX ADMIN — GUAIFENESIN 200 MG: 100 SOLUTION ORAL at 08:19

## 2020-09-14 RX ADMIN — FORMOTEROL FUMARATE DIHYDRATE 20 MCG: 20 SOLUTION RESPIRATORY (INHALATION) at 07:32

## 2020-09-14 RX ADMIN — PREDNISONE 10 MG: 10 TABLET ORAL at 08:19

## 2020-09-14 RX ADMIN — IPRATROPIUM BROMIDE AND ALBUTEROL SULFATE 3 ML: 2.5; .5 SOLUTION RESPIRATORY (INHALATION) at 07:33

## 2020-09-14 RX ADMIN — IPRATROPIUM BROMIDE AND ALBUTEROL SULFATE 3 ML: 2.5; .5 SOLUTION RESPIRATORY (INHALATION) at 11:19

## 2020-09-14 RX ADMIN — PANTOPRAZOLE SODIUM 40 MG: 40 TABLET, DELAYED RELEASE ORAL at 08:19

## 2020-09-14 RX ADMIN — GUAIFENESIN 200 MG: 100 SOLUTION ORAL at 21:16

## 2020-09-14 RX ADMIN — LORAZEPAM 1 MG: 1 TABLET ORAL at 15:20

## 2020-09-14 RX ADMIN — IPRATROPIUM BROMIDE AND ALBUTEROL SULFATE 3 ML: 2.5; .5 SOLUTION RESPIRATORY (INHALATION) at 20:15

## 2020-09-14 NOTE — ASSESSMENT & PLAN NOTE
Pt had 1x syncopal episode day of admission  Patient feels lightheaded in bed  EKG- normal sinus rhythm   BP - 181/9   CT head and neck w/o contrast - No evidence of acute intracranial hemorrhage  Delco Organ Approximately 68% atherosclerotic plaque stenosis proximal left cervical ICA     - Telemetry   - orthostatic vitals-negative  -Consult cardio- tilt table test, adjust hypertension medications-norvasc 5 mg and discontinue triamterene HCTZ  -Tilt table test- positive tilt table test  -Change norvasc 5 mg to 2 5 mg  Discontinue flomax and triamterene HCTZ     -Start midodrine 2 5 mg TID  -

## 2020-09-14 NOTE — CONSULTS
Consultation - Cardiology   Enio Richards 67 y o  male MRN: [id number]  Unit/Bed#: 2 [place] [address] Encounter: [id number]    Assessment/Plan     Assessment:  1  Syncope in the setting of dehydration, most likely component of orthostasis and concerning for possible polypharmacy  2  COPD with alpha 1 antitrypsin deficiency  3  Hypertension, supine  4  Anxiety depression      Plan:  Patient has been admitted to [place] [address] service  1  Will discontinue patient's Dyazide at this time and cut back Norvasc to 5 mg p o  Daily  May need to change patient's antihypertensive agent completely as Norvasc may not be appropriate agent for him due to its smooth muscle dilatation affect  2  Continue to monitor patient's resting and orthostatic vital signs and adjust medication as needed  May need to add nighttime dose of nitroglycerin patch for supine hypertension  3  At compression stockings, encourage patient to stay well hydrated and change positions slowly  4  Will obtain tilt-table to evaluate for chronotropic incompetence with or without asystole in a m  5  Consider geriatric evaluation due to polypharmacy:  Patient is on Ativan, BuSpar high dose, Neurontin, trazodone and over-the-counter sleeping pill  6  Discontinue Flomax if patient is not obtaining any improvement in urinary stream as this may also be contributing to his hypo to tension  History of Present Illness   Physician Requesting Consult: Fredo Pritchett MD  Reason for Consult / Principal Problem:  Syncope, positive orthostasis, patient appeared to be mildly dehydrated on admission    HPI: Enio Richards is a 67y o  year old male who presented to the emergency room via EMS yesterday after he experienced a syncopal episode while on his front porch  Patient states over the last few months he has not been feeling well his major complaint has been ongoing lightheadedness    He states he when out on his porch yesterday to sit down and get some air  He states he was only there for about 5 minutes when he noticed his lightheadedness becoming worse  He stood up and took a few steps to his back door and passed out  He states his only prodrome will symptom was worsening lightheadedness and tunnel vision  He denied any diaphoresis, tachycardia, chest pain or shortness of breath  On presentation his BUN and creatinine were noted to be elevated concerning for possible dehydration  Patient is on Norvasc 10 mg daily and Dyazide 37 5/25 daily  He denies any new medications starting over the last 6 months except for an over-the-counter sleeping pill which he has been using in conjunction with his trazodone, which he just started using approximately 2-3 days ago  Initial orthostatics performed in the emergency room were negative  Orthostatics performed today on the floor were noted to have a 30 mm drop in his systolic blood pressure when going from a sitting to standing position  These vital signs were performed approximately an hour and half after he did receive his Norvasc  Patient has a past medical history for COPD with alpha-1 antitrypsin deficiency, hypertension, irritable bowel syndrome, anxiety, and depression  Inpatient consult to Cardiology  Consult performed by: Valentino Church, CRNP  Consult ordered by: Gela Patiño MD          Review of Systems   Constitutional: Positive for activity change and fatigue  Negative for appetite change and chills  HENT: Negative  Negative for congestion, ear discharge, hearing loss, rhinorrhea, sore throat and trouble swallowing  Eyes: Negative  Negative for photophobia and visual disturbance  Respiratory: Positive for shortness of breath  Chronic shortness of breath   Cardiovascular: Negative  Negative for chest pain, palpitations and leg swelling  Gastrointestinal: Negative  Negative for abdominal distention, constipation, diarrhea, nausea and vomiting  Endocrine: Negative  Negative for polydipsia, polyphagia and polyuria  Genitourinary: Negative  Musculoskeletal: Positive for gait problem  Ambulates with walker   Neurological: Positive for dizziness, weakness and light-headedness  Hematological: Negative  Psychiatric/Behavioral: Negative  Historical Information   Past Medical History:   Diagnosis Date    Anesthesia complication     Difficult to wake up    Arthritis     BPH (benign prostatic hyperplasia)     urinary frequency    Cancer (HCC)     basal cell neck, face    COPD exacerbation ([facility] ) 12/29/2019    Full dentures     Hiatal hernia     History of methicillin resistant staphylococcus aureus (MRSA)     10/11/2018 MRSA (nares) positive    Irritable bowel syndrome     Kidney stone     at least 7 episodes    Liver disease     Alpha 1- enzyme deficiency - diagnosed 2002  has been on weekly replacement therapy since then    Pulmonary emphysema ([facility] )     1/25/15  FEV1 - 2 45 liters or 59% of predicted    RSV infection 12/2017    Wears glasses     for driving only     Past Surgical History:   Procedure Laterality Date    BACK SURGERY  2008    discectomy    COLONOSCOPY      COLONOSCOPY N/A 3/10/2017    Procedure: Suni Valdovinos;  Surgeon: Emilia Aleman MD;  Location: [place] [address] GI LAB; Service:    Precilla Enzo      removal of kidney stones    ESOPHAGOGASTRODUODENOSCOPY N/A 3/10/2017    Procedure: ESOPHAGOGASTRODUODENOSCOPY (EGD); Surgeon: Emilia Aleman MD;  Location: [facility] GI LAB; Service:     LITHOTRIPSY      [place] ESOPHAGOGASTRODUODENOSCOPY TRANSORAL DIAGNOSTIC N/A 11/20/2018    Procedure: ESOPHAGOGASTRODUODENOSCOPY (EGD); Surgeon: Emilia Aleman MD;  Location: [facility] GI LAB;   Service: Gastroenterology    TONSILLECTOMY      VEIN LIGATION AND STRIPPING Bilateral     1980's     Social History     Substance and Sexual Activity   Alcohol Use Not Currently    Frequency: Never    Comment: stopped in 2009     Social History     Substance and Sexual Activity   Drug Use Not Currently     E-Cigarette/Vaping    E-Cigarette Use Never User      E-Cigarette/Vaping Substances    Nicotine No     THC No     CBD No     Flavoring No     Other No     Unknown No      Social History     Tobacco Use   Smoking Status Former Smoker    Packs/day: 1 00    Years: 60 00    Pack years: 60 00    Last attempt to quit: 8/31/2017    Years since quitting: 3 0   Smokeless Tobacco Never Used   Tobacco Comment    quit in august 2017     Family History:   Family History   Problem Relation Age of Onset    Emphysema Mother         never smoked    Emphysema Father     Cancer Brother         colon    Colon cancer Brother     Ulcerative colitis Family     Liver disease Family        Meds/Allergies   all current active meds have been reviewed, current meds:   Current Facility-Administered Medications   Medication Dose Route Frequency    acetaminophen (TYLENOL) tablet 650 mg  650 mg Oral Q6H PRN    amLODIPine (NORVASC) tablet 10 mg  10 mg Oral Daily    budesonide (PULMICORT) inhalation solution 0 5 mg  0 5 mg Nebulization BID    busPIRone (BUSPAR) tablet 10 mg  10 mg Oral TID    cholestyramine sugar free (QUESTRAN LIGHT) packet 4 g  4 g Oral BID    enoxaparin (LOVENOX) subcutaneous injection 40 mg  40 mg Subcutaneous Daily    famotidine (PEPCID) tablet 20 mg  20 mg Oral Daily    fluticasone (FLONASE) 50 mcg/act nasal spray 2 spray  2 spray Nasal Daily    formoterol (PERFOROMIST) nebulizer solution 20 mcg  20 mcg Nebulization BID    gabapentin (NEURONTIN) capsule 300 mg  300 mg Oral TID    guaiFENesin (ROBITUSSIN) oral solution 200 mg  200 mg Oral BID    ipratropium-albuterol (DUO-NEB) 0 5-2 5 mg/3 mL inhalation solution 3 mL  3 mL Nebulization 4x Daily    LORazepam (ATIVAN) tablet 1 mg  1 mg Oral BID    pantoprazole (PROTONIX) EC tablet 40 mg  40 mg Oral BID    predniSONE tablet 10 mg  10 mg Oral Daily    triamterene-hydrochlorothiazide (MAXZIDE-25) 37 5-25 mg per tablet 1 tablet  1 tablet Oral Daily    and PTA meds:   Prior to Admission Medications   Prescriptions Last Dose Informant Patient Reported? Taking? LORazepam (ATIVAN) 1 mg tablet 2020 at 1000 Pharmacy (Specify) No Yes   Sig: TAKE ONE TABLET BY MOUTH TWICE DAILY    OXYGEN-HELIUM IN  Pharmacy ([address] [place] [address]) Yes Yes   Sig: Inhale 2L at rest- 3L with activity   VENTOLIN  (90 Base) MCG/ACT inhaler  Pharmacy (Specify) Yes Yes   Sig: Inhale 1 puff every 6 (six) hours as needed    ZEMAIRA infusion  Pharmacy ([address] [place] [address]) Yes Yes   Sig: once a week    amLODIPine (NORVASC) 10 mg tablet 2020 at 1000 Pharmacy (Specify) No Yes   Sig: Take 1 tablet (10 mg total) by mouth daily   budesonide (PULMICORT) 0 5 mg/2 mL nebulizer solution 2020 at 1000 Self No Yes   Sig: Take 1 vial (0 5 mg total) by nebulization 2 (two) times a day for 5 days Rinse mouth after use     busPIRone (BUSPAR) 10 mg tablet 2020 at 1000 Pharmacy (Specify) No Yes   Sig: TAKE ONE TABLET BY MOUTH THREE TIMES DAILY    cholestyramine sugar free (QUESTRAN LIGHT) 4 g packet 2020 at 1000 Pharmacy (Specify) No Yes   Sig: Take 1 packet (4 g total) by mouth 2 (two) times a day   doxylamine (Unisom SleepTabs) 25 MG tablet 2020 at 2300  Yes Yes   Sig: Take 25 mg by mouth daily at bedtime as needed for sleep   famotidine (PEPCID) 20 mg tablet 2020 at 1000 Pharmacy (Specify) No Yes   Sig: Take 1 tablet (20 mg total) by mouth daily   finasteride (PROSCAR) 5 mg tablet 2020 at 1000 Pharmacy (Specify) Yes Yes   Sig: Take 5 mg by mouth every morning     fluticasone (FLONASE) 50 mcg/act nasal spray 2020 at 1000 Pharmacy (Specify) No Yes   Si sprays into each nostril daily   formoterol (PERFOROMIST) 20 MCG/2ML nebulizer solution 2020 at 1000 Pharmacy (Specify) Yes Yes   Sig: Take 20 mcg by nebulization 2 (two) times a day   gabapentin (NEURONTIN) 300 mg capsule 2020 at 1000 Pharmacy (Specify) No Yes   Sig: TAKE ONE CAPSULE BY MOUTH THREE TIMES DAILY    guaiFENesin (ROBITUSSIN) 100 MG/5ML oral liquid 9/13/2020 at 1000  Yes Yes   Sig: Take 200 mg by mouth 2 (two) times a day   ipratropium-albuterol (DUO-NEB) 0 5-2 5 mg/3 mL nebulizer solution 9/13/2020 at 1000 Pharmacy (Specify) No Yes   Sig: Take 1 vial (3 mL total) by nebulization 4 (four) times a day   pantoprazole (PROTONIX) 40 mg tablet 9/13/2020 at 1000 Pharmacy (Specify) No Yes   Sig: Take 1 tablet (40 mg total) by mouth 2 (two) times a day   predniSONE 10 mg tablet 9/13/2020 at 1000  Yes Yes   Sig: Take 10 mg by mouth daily   predniSONE 20 mg tablet Not Taking at Unknown time Pharmacy (Specify) No No   Sig: Take 1 tablet (20 mg total) by mouth daily for 20 days   Patient not taking: Reported on 9/13/2020   tamsulosin (FLOMAX) 0 4 mg Not Taking at Unknown time Pharmacy (Specify) No No   Sig: Half a tablet daily   Patient not taking: Reported on 9/13/2020   tamsulosin (FLOMAX) 0 4 mg 9/13/2020 at 1000  Yes Yes   Sig: Take 0 4 mg by mouth daily   traZODone (DESYREL) 50 mg tablet 9/12/2020 at [address] [facility] (Specify) No Yes   Sig: TAKE ONE TABLET BY MOUTH NIGHTLY AT BEDTIME    triamterene-hydrochlorothiazide (DYAZIDE) 37 5-25 mg per capsule Not Taking at Unknown time Pharmacy (Specify) No No   Sig: TAKE ONE CAPSULE BY MOUTH DAILY AS NEEDED for leg swelling   Patient not taking: Reported on 9/13/2020      Facility-Administered Medications: None     Allergies   Allergen Reactions    Chantix [Varenicline]      Caused prostate infection       Objective   Vitals: Blood pressure (!) 193/93, pulse 75, temperature (!) 97 1 °F (36 2 °C), temperature source Oral, resp  rate 19, height 6' 5" (1 956 m), weight 84 4 kg (186 lb 1 1 oz), SpO2 95 %    Orthostatic Blood Pressures      Most Recent Value   Blood Pressure  (!) 193/93 [Residents made aware] filed at 09/14/2020 1355   Patient Position - Orthostatic VS  Standing - Orthostatic VS filed at 09/14/2020 1101            Intake/Output Summary (Last 24 hours) at 9/14/2020 1418  Last data filed at 9/14/2020 0835  Gross per 24 hour   Intake 990 ml   Output 450 ml   Net 540 ml       Invasive Devices     Peripheral Intravenous Line            Peripheral IV Left Antecubital -- days                Physical Exam  Vitals signs and nursing note reviewed  Constitutional:       Appearance: He is obese  HENT:      Head: Normocephalic and atraumatic  Right Ear: External ear normal       Left Ear: External ear normal       Nose: Nose normal    Eyes:      General: No scleral icterus  Right eye: No discharge  Left eye: No discharge  Conjunctiva/sclera: Conjunctivae normal       Pupils: Pupils are equal, round, and reactive to light  Neck:      Musculoskeletal: Normal range of motion  Cardiovascular:      Rate and Rhythm: Normal rate and regular rhythm  Pulses: Normal pulses  Heart sounds: Normal heart sounds  No murmur  Pulmonary:      Effort: Pulmonary effort is normal       Breath sounds: Normal breath sounds  No wheezing, rhonchi or rales  Abdominal:      General: Bowel sounds are normal  There is no distension  Palpations: Abdomen is soft  Musculoskeletal:      Right lower leg: No edema  Left lower leg: No edema  Skin:     General: Skin is warm and dry  Capillary Refill: Capillary refill takes less than 2 seconds  Neurological:      General: No focal deficit present  Mental Status: He is alert and oriented to person, place, and time  Mental status is at baseline  Psychiatric:         Attention and Perception: Attention normal          Mood and Affect: Mood normal          Speech: Speech normal          Behavior: Behavior normal          Thought Content: Thought content normal          Cognition and Memory: Cognition is impaired  Judgment: Judgment normal       Comments: Mild cognitive impairment         Lab Results:   I have personally reviewed pertinent lab results      CBC with diff:   Results from last 7 days   Lab Units 09/14/20  0512   WBC Thousand/uL 8 71   RBC Million/uL 4 28   HEMOGLOBIN g/dL 13 9   HEMATOCRIT % 42 6   MCV fL 100*   MCH pg 32 5   MCHC g/dL 32 6   RDW % 12 8   MPV fL 10 2   PLATELETS Thousands/uL 114*     CMP:   Results from last 7 days   Lab Units 09/14/20  0512 09/13/20  1603   SODIUM mmol/L 139 138   POTASSIUM mmol/L 3 9 4 1   CHLORIDE mmol/L 105 104   CO2 mmol/L 21 26   BUN mg/dL 17 23   CREATININE mg/dL 1 21 1 27   CALCIUM mg/dL 8 2* 8 6   AST U/L  --  56*   ALT U/L  --  121*   ALK PHOS U/L  --  73   EGFR ml/min/1 73sq m 58 55     Troponin:   0   Lab Value Date/Time    TROPONINI <0 02 09/13/2020 1603    TROPONINI <0 02 08/26/2020 1351    TROPONINI <0 02 08/19/2020 1341    TROPONINI <0 02 05/17/2020 1100    TROPONINI <0 02 05/17/2020 0854    TROPONINI <0 02 05/11/2020 1444    TROPONINI 0 02 01/26/2020 1335    TROPONINI <0 02 12/03/2019 1140    TROPONINI 0 05 (H) 11/11/2019 0748    TROPONINI 0 07 (H) 11/11/2019 0309    TROPONINI 0 10 (H) 11/10/2019 2342    TROPONINI 0 13 (H) 11/10/2019 2031    TROPONINI <0 02 06/28/2019 1732    TROPONINI <0 02 10/11/2018 1839    TROPONINI <0 02 03/30/2018 0042    TROPONINI <0 02 03/29/2018 1921    TROPONINI <0 02 02/14/2018 1615    TROPONINI <0 02 12/29/2017 1014     BNP:   Results from last 7 days   Lab Units 09/14/20  0512   POTASSIUM mmol/L 3 9   CHLORIDE mmol/L 105   CO2 mmol/L 21   BUN mg/dL 17   CREATININE mg/dL 1 21   CALCIUM mg/dL 8 2*   EGFR ml/min/1 73sq m 58     Coags:   Results from last 7 days   Lab Units 09/13/20  1603   PTT seconds 25   INR  1 03     Magnesium:   Results from last 7 days   Lab Units 09/14/20  0512   MAGNESIUM mg/dL 2 1     Imaging: I have personally reviewed pertinent reports      EKG:  Sinus rhythm  VTE Prophylaxis: Sequential compression device Bryanna Santos)     Code Status: Level 1 - Full Code  Advance Directive and Living Will:      Power of :    POLST:      Gris Espinoza, [address]  Cardiology

## 2020-09-14 NOTE — PLAN OF CARE
Problem: Potential for Falls  Goal: Patient will remain free of falls  Description: INTERVENTIONS:  - Assess patient frequently for physical needs  -  Identify cognitive and physical deficits and behaviors that affect risk of falls  -  [place] fall precautions as indicated by assessment   - Educate patient/family on patient safety including physical limitations  - Instruct patient to call for assistance with activity based on assessment  - Modify environment to reduce risk of injury  - Consider OT/PT consult to assist with strengthening/mobility  Outcome: Progressing     Problem: Nutrition/Hydration-ADULT  Goal: Nutrient/Hydration intake appropriate for improving, restoring or maintaining nutritional needs  Description: Monitor and assess patient's nutrition/hydration status for malnutrition  Collaborate with interdisciplinary team and initiate plan and interventions as ordered  Monitor patient's weight and dietary intake as ordered or per policy  Utilize nutrition screening tool and intervene as necessary  Determine patient's food preferences and provide high-protein, high-caloric foods as appropriate       INTERVENTIONS:  - Monitor oral intake, urinary output, labs, and treatment plans  - Assess nutrition and hydration status and recommend course of action  - Evaluate amount of meals eaten  - Assist patient with eating if necessary   - Allow adequate time for meals  - Recommend/ encourage appropriate diets, oral nutritional supplements, and vitamin/mineral supplements  - Assess need for intravenous fluids  - Provide specific nutrition/hydration education as appropriate  - Include patient/family/caregiver in decisions related to nutrition  Outcome: Progressing     Problem: PAIN - ADULT  Goal: Verbalizes/displays adequate comfort level or baseline comfort level  Description: Interventions:  - Encourage patient to monitor pain and request assistance  - Assess pain using appropriate pain scale  - Administer analgesics based on type and severity of pain and evaluate response  - Implement non-pharmacological measures as appropriate and evaluate response  - Consider cultural and social influences on pain and pain management  - Notify physician/advanced practitioner if interventions unsuccessful or patient reports new pain  Outcome: Progressing     Problem: INFECTION - ADULT  Goal: Absence or prevention of progression during hospitalization  Description: INTERVENTIONS:  - Assess and monitor for signs and symptoms of infection  - Monitor lab/diagnostic results  - Monitor all insertion sites, i e  indwelling lines, tubes, and drains  - Administer medications as ordered  - Instruct and encourage patient and family to use good hand hygiene technique  - Identify and instruct in appropriate isolation precautions for identified infection/condition  Outcome: Progressing  Goal: Absence of fever/infection during neutropenic period  Description: INTERVENTIONS:  - Monitor WBC    Outcome: Progressing     Problem: SAFETY ADULT  Goal: Patient will remain free of falls  Description: INTERVENTIONS:  - Assess patient frequently for physical needs  -  Identify cognitive and physical deficits and behaviors that affect risk of falls    -  [place] fall precautions as indicated by assessment   - Educate patient/family on patient safety including physical limitations  - Instruct patient to call for assistance with activity based on assessment  - Modify environment to reduce risk of injury  - Consider OT/PT consult to assist with strengthening/mobility  Outcome: Progressing  Goal: Maintain or return to baseline ADL function  Description: INTERVENTIONS:  -  Assess patient's ability to carry out ADLs; assess patient's baseline for ADL function and identify physical deficits which impact ability to perform ADLs (bathing, care of mouth/teeth, toileting, grooming, dressing, etc )  - Assess/evaluate cause of self-care deficits   - Assess range of motion  - Assess patient's mobility; develop plan if impaired  - Assess patient's need for assistive devices and provide as appropriate  - Encourage maximum independence but intervene and supervise when necessary  - Involve family in performance of ADLs  - Assess for home care needs following discharge   - Consider OT consult to assist with ADL evaluation and planning for discharge  - Provide patient education as appropriate  Outcome: Progressing  Goal: Maintain or return mobility status to optimal level  Description: INTERVENTIONS:  - Assess patient's baseline mobility status (ambulation, transfers, stairs, etc )    - Identify cognitive and physical deficits and behaviors that affect mobility  - Identify mobility aids required to assist with transfers and/or ambulation (gait belt, sit-to-stand, lift, walker, cane, etc )  - [place] fall precautions as indicated by assessment  - Instruct patient to call for assistance with activity based on assessment  - Consider rehabilitation consult to assist with strengthening/weightbearing, etc   Outcome: Progressing     Problem: DISCHARGE PLANNING  Goal: Discharge to home or other facility with appropriate resources  Description: INTERVENTIONS:  - Identify barriers to discharge w/patient and caregiver  - Arrange for needed discharge resources and transportation as appropriate  - Identify discharge learning needs (meds, wound care, etc )  - Refer to Case Management Department for coordinating discharge planning if the patient needs post-hospital services based on physician/advanced practitioner order or complex needs related to functional status, cognitive ability, or social support system  Outcome: Progressing     Problem: Knowledge Deficit  Goal: Patient/family/caregiver demonstrates understanding of disease process, treatment plan, medications, and discharge instructions  Description: Complete learning assessment and assess knowledge base    Interventions:  - Provide teaching at level of understanding  - Provide teaching via preferred learning methods  Outcome: Progressing     Problem: RESPIRATORY - ADULT  Goal: Achieves optimal ventilation and oxygenation  Description: INTERVENTIONS:  - Assess for changes in respiratory status  - Assess for changes in mentation and behavior  - Position to facilitate oxygenation and minimize respiratory effort  - Oxygen administered by appropriate delivery if ordered  - Initiate smoking cessation education as indicated  - Encourage broncho-pulmonary hygiene including cough, deep breathe, Incentive Spirometry  - Assess the need for suctioning and aspirate as needed  - Assess and instruct to report SOB or any respiratory difficulty  - Respiratory Therapy support as indicated  Outcome: Progressing     Problem: Neurological Deficit  Goal: Neurological status is stable or improving  Description: Interventions:  - Monitor and assess patient's level of consciousness, motor function, sensory function, and level of assistance needed for ADLs  - Monitor and report changes from baseline  Collaborate with interdisciplinary team to initiate plan and implement interventions as ordered  - Provide and maintain a safe environment  - Consider seizure precautions  - Consider fall precautions  - Consider aspiration precautions  - Consider bleeding precautions  Outcome: Progressing     Problem: Activity Intolerance/Impaired Mobility  Goal: Mobility/activity is maintained at optimum level for patient  Description: Interventions:  - Assess and monitor patient  barriers to mobility and need for assistive/adaptive devices  - Assess patient's emotional response to limitations  - Collaborate with interdisciplinary team and initiate plans and interventions as ordered  - Encourage independent activity per ability   - Maintain proper body alignment  - Perform active/passive rom as tolerated/ordered    - Plan activities to conserve energy   - Turn patient as appropriate  Outcome: Progressing     Problem: Communication Impairment  Goal: Ability to express needs and understand communication  Description: Assess patient's communication skills and ability to understand information  Patient will demonstrate use of effective communication techniques, alternative methods of communication and understanding even if not able to speak  - Encourage communication and provide alternate methods of communication as needed  - Collaborate with case management/ for discharge needs  - Include patient/family/caregiver in decisions related to communication  Outcome: Progressing     Problem: Potential for Aspiration  Goal: Non-ventilated patient's risk of aspiration is minimized  Description: Assess and monitor vital signs, respiratory status, and labs (WBC)  Monitor for signs of aspiration (tachypnea, cough, rales, wheezing, cyanosis, fever)  - Assess and monitor patient's ability to swallow  - Place patient up in chair to eat if possible  - HOB up at 90 degrees to eat if unable to get patient up into chair   - Supervise patient during oral intake  - Instruct patient/ family to take small bites  - Instruct patient/ family to take small single sips when taking liquids  - Follow patient-specific strategies generated by speech pathologist   Outcome: Progressing     Problem: Nutrition  Goal: Nutrition/Hydration status is improving  Description: Monitor and assess patient's nutrition/hydration status for malnutrition (ex- brittle hair, bruises, dry skin, pale skin and conjunctiva, muscle wasting, smooth red tongue, and disorientation)  Collaborate with interdisciplinary team and initiate plan and interventions as ordered  Monitor patient's weight and dietary intake as ordered or per policy  Utilize nutrition screening tool and intervene per policy  Determine patient's food preferences and provide high-protein, high-caloric foods as appropriate       - Assist patient with eating   - Allow adequate time for meals   - Encourage patient to take dietary supplement as ordered  - Collaborate with clinical nutritionist   - Include patient/family/caregiver in decisions related to nutrition    Outcome: Progressing

## 2020-09-14 NOTE — PROGRESS NOTES
[facility] Practice Progress Note - Jessica Hickman 67 y o  male MRN: [id number]    Unit/Bed#: 2 [place] [address] Encounter: [id number]      Assessment/Plan:    COPD (chronic obstructive pulmonary disease) ([address] )  Assessment & Plan  History of COPD  Currently stable    Continue home medications:  DuoNebs, Flonase 50 mcg, performist, prednisone 10 mg daily, pulmicort    Insomnia  Assessment & Plan  Hx of insomnia currently taking ativan 1mg and trazadone        Hold ativan 1mg   Hold doxylamine 25mg   Discontinue trazadone 50mg because of syncopal effects    BPH (benign prostatic hyperplasia)  Assessment & Plan  History of BPH  Currently stable  Hold tamsulosin 0 2mg daily  Hold finestride 5mg       Essential hypertension  Assessment & Plan  History of  Hypertension  BP today ranging 160s-180s /70s-80s    Continue amlodipine 10 mg p o  Daily  Hold  triamterene-hydrochlorothiazide 37 5-25 mg daily  Monitor vital signs    Gastroesophageal reflux disease  Assessment & Plan  Hx of GERD  Currently stable     Continue pepcid 20mg   Continue 40mg PO     * Syncope  Assessment & Plan  Pt had 1x syncopal episode today  EKG- normal sinus rhythm   BP - 181/9   CT head and neck w/o contrast - No evidence of acute intracranial hemorrhage  [place] Approximately 68% atherosclerotic plaque stenosis proximal left cervical ICA     - Telemetry   - orthostatic vitals-negative  -Consult cardio            Subjective:     Patient was examined at bedside  He stated he feels lightheaded even when he is lying in bed  He stated he has been having this feeling for a couple months  He denies loss of appetite, chest pain, nausea, and vomiting  Monitor on telemetry  Objective:     Vitals: Blood pressure 130/77, pulse 89, temperature (!) 97 1 °F (36 2 °C), temperature source Oral, resp  rate 19, height 6' 5" (1 956 m), weight 84 4 kg (186 lb 1 1 oz), SpO2 95 %  ,Body mass index is 22 06 kg/m²    Wt Readings from Last 3 Encounters:   09/13/20 84 4 kg (186 lb 1 1 oz)   09/11/20 84 5 kg (186 lb 3 2 oz)   09/04/20 85 3 kg (188 lb)       Intake/Output Summary (Last 24 hours) at 9/14/2020 1312  Last data filed at 9/14/2020 0835  Gross per 24 hour   Intake 990 ml   Output 450 ml   Net 540 ml       Physical Exam: Constitutional:       Appearance: Elderly male  HENT:      Head: Normocephalic and atraumatic  Cardiovascular:      Rate and Rhythm: Normal rate and regular rhythm  Pulmonary:      Effort: Pulmonary effort is normal       Breath sounds: Normal breath sounds  Abdominal:      General: Abdomen is flat  Palpations: Abdomen is soft  Neurological:      General: No focal deficit present  Mental Status: He is oriented to person, place, and time       Recent Results (from the past 24 hour(s))   Fingerstick Glucose (POCT)    Collection Time: 09/13/20  3:55 PM   Result Value Ref Range    POC Glucose 144 (H) 65 - 140 mg/dl   Comprehensive metabolic panel    Collection Time: 09/13/20  4:03 PM   Result Value Ref Range    Sodium 138 136 - 145 mmol/L    Potassium 4 1 3 5 - 5 3 mmol/L    Chloride 104 100 - 108 mmol/L    CO2 26 21 - 32 mmol/L    ANION GAP 8 4 - 13 mmol/L    BUN 23 5 - 25 mg/dL    Creatinine 1 27 0 60 - 1 30 mg/dL    Glucose 160 (H) 65 - 140 mg/dL    Calcium 8 6 8 3 - 10 1 mg/dL    AST 56 (H) 5 - 45 U/L     (H) 12 - 78 U/L    Alkaline Phosphatase 73 46 - 116 U/L    Total Protein 6 7 6 4 - 8 2 g/dL    Albumin 3 6 3 5 - 5 0 g/dL    Total Bilirubin 1 10 (H) 0 20 - 1 00 mg/dL    eGFR 55 ml/min/1 73sq m   CBC and differential    Collection Time: 09/13/20  4:03 PM   Result Value Ref Range    WBC 8 14 4 31 - 10 16 Thousand/uL    RBC 4 32 3 88 - 5 62 Million/uL    Hemoglobin 14 2 12 0 - 17 0 g/dL    Hematocrit 41 8 36 5 - 49 3 %    MCV 97 82 - 98 fL    MCH 32 9 26 8 - 34 3 pg    MCHC 34 0 31 4 - 37 4 g/dL    RDW 13 0 11 6 - 15 1 %    MPV 9 4 8 9 - 12 7 fL    Platelets 139 (L) 512 - 390 Thousands/uL    nRBC 0 /100 WBCs    Neutrophils Relative 81 (H) 43 - 75 %    Immat GRANS % 1 0 - 2 %    Lymphocytes Relative 11 (L) 14 - 44 %    Monocytes Relative 5 4 - 12 %    Eosinophils Relative 1 0 - 6 %    Basophils Relative 1 0 - 1 %   Protime-INR    Collection Time: 09/13/20  4:03 PM   Result Value Ref Range    Protime 13 4 11 6 - 14 5 seconds    INR 1 03 0 84 - 1 19   APTT    Collection Time: 09/13/20  4:03 PM   Result Value Ref Range    PTT 25 23 - 37 seconds   Troponin I    Collection Time: 09/13/20  4:03 PM   Result Value Ref Range    Troponin I <0 02 <=0 04 ng/mL   Basic metabolic panel    Collection Time: 09/14/20  5:12 AM   Result Value Ref Range    Sodium 139 136 - 145 mmol/L    Potassium 3 9 3 5 - 5 3 mmol/L    Chloride 105 100 - 108 mmol/L    CO2 21 21 - 32 mmol/L    ANION GAP 13 4 - 13 mmol/L    BUN 17 5 - 25 mg/dL    Creatinine 1 21 0 60 - 1 30 mg/dL    Glucose 109 65 - 140 mg/dL    Calcium 8 2 (L) 8 3 - 10 1 mg/dL    eGFR 58 ml/min/1 73sq m   Magnesium    Collection Time: 09/14/20  5:12 AM   Result Value Ref Range    Magnesium 2 1 1 6 - 2 6 mg/dL   Phosphorus    Collection Time: 09/14/20  5:12 AM   Result Value Ref Range    Phosphorus 3 0 2 3 - 4 1 mg/dL   CBC and differential    Collection Time: 09/14/20  5:12 AM   Result Value Ref Range    WBC 8 71 4 31 - 10 16 Thousand/uL    RBC 4 28 3 88 - 5 62 Million/uL    Hemoglobin 13 9 12 0 - 17 0 g/dL    Hematocrit 42 6 36 5 - 49 3 %     (H) 82 - 98 fL    MCH 32 5 26 8 - 34 3 pg    MCHC 32 6 31 4 - 37 4 g/dL    RDW 12 8 11 6 - 15 1 %    MPV 10 2 8 9 - 12 7 fL    Platelets 217 (L) 800 - 390 Thousands/uL    nRBC 0 /100 WBCs    Neutrophils Relative 62 43 - 75 %    Immat GRANS % 1 0 - 2 %    Lymphocytes Relative 27 14 - 44 %    Monocytes Relative 6 4 - 12 %    Eosinophils Relative 3 0 - 6 %    Basophils Relative 1 0 - 1 %    Neutrophils Absolute 5 44 1 85 - 7 62 Thousands/µL    Immature Grans Absolute 0 06 0 00 - 0 20 Thousand/uL    Lymphocytes Absolute 2 37 0 60 - 4 47 Thousands/µL    Monocytes Absolute 0  52 0 17 - 1 22 Thousand/µL    Eosinophils Absolute 0 26 0 00 - 0 61 Thousand/µL    Basophils Absolute 0 06 0 00 - 0 10 Thousands/µL       Current Facility-Administered Medications   Medication Dose Route Frequency Provider Last Rate Last Dose    acetaminophen (TYLENOL) tablet 650 mg  650 mg Oral Q6H PRN Viola Radar, DO        amLODIPine (NORVASC) tablet 10 mg  10 mg Oral Daily Viola Radar, DO   10 mg at 09/14/20 0819    budesonide (PULMICORT) inhalation solution 0 5 mg  0 5 mg Nebulization BID Viola Radar, DO   0 5 mg at 09/14/20 0732    busPIRone (BUSPAR) tablet 10 mg  10 mg Oral TID Viola Radar, DO   10 mg at 09/14/20 4840    cholestyramine sugar free (QUESTRAN LIGHT) packet 4 g  4 g Oral BID Viola Radar, DO   4 g at 09/14/20 0819    enoxaparin (LOVENOX) subcutaneous injection 40 mg  40 mg Subcutaneous Daily Viola Radar, DO   40 mg at 09/14/20 0819    famotidine (PEPCID) tablet 20 mg  20 mg Oral Daily Viola Radar, DO   20 mg at 09/14/20 0819    fluticasone (FLONASE) 50 mcg/act nasal spray 2 spray  2 spray Nasal Daily Viola Radar, DO   2 spray at 09/14/20 0819    formoterol (PERFOROMIST) nebulizer solution 20 mcg  20 mcg Nebulization BID Viola Radar, DO   20 mcg at 09/14/20 0732    gabapentin (NEURONTIN) capsule 300 mg  300 mg Oral TID Viola Radar, DO   300 mg at 09/14/20 2076    guaiFENesin (ROBITUSSIN) oral solution 200 mg  200 mg Oral BID Viola Radar, DO   200 mg at 09/14/20 0819    ipratropium-albuterol (DUO-NEB) 0 5-2 5 mg/3 mL inhalation solution 3 mL  3 mL Nebulization 4x Daily Viola Radar, DO   3 mL at 09/14/20 1119    pantoprazole (PROTONIX) EC tablet 40 mg  40 mg Oral BID Viola Radar, DO   40 mg at 09/14/20 0819    predniSONE tablet 10 mg  10 mg Oral Daily Viola Radar, DO   10 mg at 09/14/20 0773       Invasive Devices     Peripheral Intravenous Line            Peripheral IV Left Antecubital -- days                Lab, Imaging and other studies: I have personally reviewed pertinent reports      VTE Pharmacologic Prophylaxis: Enoxaparin (Lovenox)  VTE Mechanical Prophylaxis: sequential compression device    Ariane Cardenas MD

## 2020-09-14 NOTE — ASSESSMENT & PLAN NOTE
History of COPD    Currently stable    Continue home medications:  DuoNebs, Flonase 50 mcg, performist, prednisone 10 mg daily, pulmicort

## 2020-09-14 NOTE — ASSESSMENT & PLAN NOTE
Hx of insomnia currently taking ativan 1mg and trazadone        Taking ativan 1mg   Hold trazadone 50 mg QD  Hold doxylamine 25mg   Discontinue trazadone 50mg because of syncopal effects

## 2020-09-14 NOTE — ASSESSMENT & PLAN NOTE
History of  Hypertension    BP today ranging 160s-180s /70s-80s    Norvasc 2 5 mg daily  Hold  triamterene-hydrochlorothiazide 37 5-25 mg daily  Monitor vital signs

## 2020-09-14 NOTE — NURSING NOTE
I spoke with the resident physician, again, regarding ordering a one time dose of blood pressure medication for the patient  The resident advised me that they had been trying to determine which medication to prescribe and realized that cardiology was looking at the patients medications currently  The resident advised that they were going to hold off on ordering BP medication until Cardiology informs them of the best treatment   In the mean time, I will continue to monitor the patient's BP

## 2020-09-14 NOTE — PLAN OF CARE
Problem: Potential for Falls  Goal: Patient will remain free of falls  Description: INTERVENTIONS:  - Assess patient frequently for physical needs  -  Identify cognitive and physical deficits and behaviors that affect risk of falls  -  [place] fall precautions as indicated by assessment   - Educate patient/family on patient safety including physical limitations  - Instruct patient to call for assistance with activity based on assessment  - Modify environment to reduce risk of injury  - Consider OT/PT consult to assist with strengthening/mobility  9/14/2020 0128 by Saint Bers, RN  Outcome: Progressing  9/13/2020 2050 by Saint Bers, RN  Outcome: Progressing     Problem: Nutrition/Hydration-ADULT  Goal: Nutrient/Hydration intake appropriate for improving, restoring or maintaining nutritional needs  Description: Monitor and assess patient's nutrition/hydration status for malnutrition  Collaborate with interdisciplinary team and initiate plan and interventions as ordered  Monitor patient's weight and dietary intake as ordered or per policy  Utilize nutrition screening tool and intervene as necessary  Determine patient's food preferences and provide high-protein, high-caloric foods as appropriate       INTERVENTIONS:  - Monitor oral intake, urinary output, labs, and treatment plans  - Assess nutrition and hydration status and recommend course of action  - Evaluate amount of meals eaten  - Assist patient with eating if necessary   - Allow adequate time for meals  - Recommend/ encourage appropriate diets, oral nutritional supplements, and vitamin/mineral supplements  - Assess need for intravenous fluids  - Provide specific nutrition/hydration education as appropriate  - Include patient/family/caregiver in decisions related to nutrition  9/14/2020 0128 by Saint Bers, RN  Outcome: Progressing  9/13/2020 2050 by Saint Bers, RN  Outcome: Progressing     Problem: PAIN - ADULT  Goal: Verbalizes/displays adequate comfort level or baseline comfort level  Description: Interventions:  - Encourage patient to monitor pain and request assistance  - Assess pain using appropriate pain scale  - Administer analgesics based on type and severity of pain and evaluate response  - Implement non-pharmacological measures as appropriate and evaluate response  - Consider cultural and social influences on pain and pain management  - Notify physician/advanced practitioner if interventions unsuccessful or patient reports new pain  9/14/2020 0128 by Damaris Horowitz RN  Outcome: Progressing  9/13/2020 2050 by Damaris Horowitz RN  Outcome: Progressing     Problem: INFECTION - ADULT  Goal: Absence or prevention of progression during hospitalization  Description: INTERVENTIONS:  - Assess and monitor for signs and symptoms of infection  - Monitor lab/diagnostic results  - Monitor all insertion sites, i e  indwelling lines, tubes, and drains  - Administer medications as ordered  - Instruct and encourage patient and family to use good hand hygiene technique  - Identify and instruct in appropriate isolation precautions for identified infection/condition  9/14/2020 0128 by Damaris Horowitz RN  Outcome: Progressing  9/13/2020 2050 by Damaris Horowitz RN  Outcome: Progressing  Goal: Absence of fever/infection during neutropenic period  Description: INTERVENTIONS:  - Monitor WBC    9/14/2020 0128 by Damaris Horowitz RN  Outcome: Progressing  9/13/2020 2050 by Damaris Horowitz RN  Outcome: Progressing     Problem: SAFETY ADULT  Goal: Patient will remain free of falls  Description: INTERVENTIONS:  - Assess patient frequently for physical needs  -  Identify cognitive and physical deficits and behaviors that affect risk of falls    -  [place] fall precautions as indicated by assessment   - Educate patient/family on patient safety including physical limitations  - Instruct patient to call for assistance with activity based on assessment  - Modify environment to reduce risk of injury  - Consider OT/PT consult to assist with strengthening/mobility  9/14/2020 0128 by Miranda Franz RN  Outcome: Progressing  9/13/2020 2050 by Miranda Franz RN  Outcome: Progressing  Goal: Maintain or return to baseline ADL function  Description: INTERVENTIONS:  -  Assess patient's ability to carry out ADLs; assess patient's baseline for ADL function and identify physical deficits which impact ability to perform ADLs (bathing, care of mouth/teeth, toileting, grooming, dressing, etc )  - Assess/evaluate cause of self-care deficits   - Assess range of motion  - Assess patient's mobility; develop plan if impaired  - Assess patient's need for assistive devices and provide as appropriate  - Encourage maximum independence but intervene and supervise when necessary  - Involve family in performance of ADLs  - Assess for home care needs following discharge   - Consider OT consult to assist with ADL evaluation and planning for discharge  - Provide patient education as appropriate  9/14/2020 0128 by Miranda Franz RN  Outcome: Progressing  9/13/2020 2050 by Miranda Franz RN  Outcome: Progressing  Goal: Maintain or return mobility status to optimal level  Description: INTERVENTIONS:  - Assess patient's baseline mobility status (ambulation, transfers, stairs, etc )    - Identify cognitive and physical deficits and behaviors that affect mobility  - Identify mobility aids required to assist with transfers and/or ambulation (gait belt, sit-to-stand, lift, walker, cane, etc )  - [place] fall precautions as indicated by assessment  - Instruct patient to call for assistance with activity based on assessment  - Consider rehabilitation consult to assist with strengthening/weightbearing, etc   9/14/2020 0128 by Miranda Franz RN  Outcome: Progressing  9/13/2020 2050 by Miranda Franz RN  Outcome: Progressing     Problem: DISCHARGE PLANNING  Goal: Discharge to home or other facility with appropriate resources  Description: INTERVENTIONS:  - Identify barriers to discharge w/patient and caregiver  - Arrange for needed discharge resources and transportation as appropriate  - Identify discharge learning needs (meds, wound care, etc )  - Refer to Case Management Department for coordinating discharge planning if the patient needs post-hospital services based on physician/advanced practitioner order or complex needs related to functional status, cognitive ability, or social support system  9/14/2020 0128 by Juan Manuel El RN  Outcome: Progressing  9/13/2020 2050 by Juan Manuel El RN  Outcome: Progressing     Problem: Knowledge Deficit  Goal: Patient/family/caregiver demonstrates understanding of disease process, treatment plan, medications, and discharge instructions  Description: Complete learning assessment and assess knowledge base    Interventions:  - Provide teaching at level of understanding  - Provide teaching via preferred learning methods  9/14/2020 0128 by Juan Manuel El RN  Outcome: Progressing  9/13/2020 2050 by Juan Manuel El RN  Outcome: Progressing     Problem: RESPIRATORY - ADULT  Goal: Achieves optimal ventilation and oxygenation  Description: INTERVENTIONS:  - Assess for changes in respiratory status  - Assess for changes in mentation and behavior  - Position to facilitate oxygenation and minimize respiratory effort  - Oxygen administered by appropriate delivery if ordered  - Initiate smoking cessation education as indicated  - Encourage broncho-pulmonary hygiene including cough, deep breathe, Incentive Spirometry  - Assess the need for suctioning and aspirate as needed  - Assess and instruct to report SOB or any respiratory difficulty  - Respiratory Therapy support as indicated  Outcome: Progressing     Problem: Neurological Deficit  Goal: Neurological status is stable or improving  Description: Interventions:  - Monitor and assess patient's level of consciousness, motor function, sensory function, and level of assistance needed for ADLs  - Monitor and report changes from baseline  Collaborate with interdisciplinary team to initiate plan and implement interventions as ordered  - Provide and maintain a safe environment  - Consider seizure precautions  - Consider fall precautions  - Consider aspiration precautions  - Consider bleeding precautions  Outcome: Progressing     Problem: Activity Intolerance/Impaired Mobility  Goal: Mobility/activity is maintained at optimum level for patient  Description: Interventions:  - Assess and monitor patient  barriers to mobility and need for assistive/adaptive devices  - Assess patient's emotional response to limitations  - Collaborate with interdisciplinary team and initiate plans and interventions as ordered  - Encourage independent activity per ability   - Maintain proper body alignment  - Perform active/passive rom as tolerated/ordered  - Plan activities to conserve energy   - Turn patient as appropriate  Outcome: Progressing     Problem: Communication Impairment  Goal: Ability to express needs and understand communication  Description: Assess patient's communication skills and ability to understand information  Patient will demonstrate use of effective communication techniques, alternative methods of communication and understanding even if not able to speak  - Encourage communication and provide alternate methods of communication as needed  - Collaborate with case management/ for discharge needs  - Include patient/family/caregiver in decisions related to communication  Outcome: Progressing     Problem: Potential for Aspiration  Goal: Non-ventilated patient's risk of aspiration is minimized  Description: Assess and monitor vital signs, respiratory status, and labs (WBC)  Monitor for signs of aspiration (tachypnea, cough, rales, wheezing, cyanosis, fever)  - Assess and monitor patient's ability to swallow    - Place patient up in chair to eat if possible  - HOB up at 90 degrees to eat if unable to get patient up into chair   - Supervise patient during oral intake  - Instruct patient/ family to take small bites  - Instruct patient/ family to take small single sips when taking liquids  - Follow patient-specific strategies generated by speech pathologist   Outcome: Progressing     Problem: Nutrition  Goal: Nutrition/Hydration status is improving  Description: Monitor and assess patient's nutrition/hydration status for malnutrition (ex- brittle hair, bruises, dry skin, pale skin and conjunctiva, muscle wasting, smooth red tongue, and disorientation)  Collaborate with interdisciplinary team and initiate plan and interventions as ordered  Monitor patient's weight and dietary intake as ordered or per policy  Utilize nutrition screening tool and intervene per policy  Determine patient's food preferences and provide high-protein, high-caloric foods as appropriate  - Assist patient with eating   - Allow adequate time for meals   - Encourage patient to take dietary supplement as ordered  - Collaborate with clinical nutritionist   - Include patient/family/caregiver in decisions related to nutrition    Outcome: Progressing

## 2020-09-14 NOTE — PLAN OF CARE
Problem: Potential for Falls  Goal: Patient will remain free of falls  Description: INTERVENTIONS:  - Assess patient frequently for physical needs  -  Identify cognitive and physical deficits and behaviors that affect risk of falls  -  [place] fall precautions as indicated by assessment   - Educate patient/family on patient safety including physical limitations  - Instruct patient to call for assistance with activity based on assessment  - Modify environment to reduce risk of injury  - Consider OT/PT consult to assist with strengthening/mobility  Outcome: Progressing     Problem: Nutrition/Hydration-ADULT  Goal: Nutrient/Hydration intake appropriate for improving, restoring or maintaining nutritional needs  Description: Monitor and assess patient's nutrition/hydration status for malnutrition  Collaborate with interdisciplinary team and initiate plan and interventions as ordered  Monitor patient's weight and dietary intake as ordered or per policy  Utilize nutrition screening tool and intervene as necessary  Determine patient's food preferences and provide high-protein, high-caloric foods as appropriate       INTERVENTIONS:  - Monitor oral intake, urinary output, labs, and treatment plans  - Assess nutrition and hydration status and recommend course of action  - Evaluate amount of meals eaten  - Assist patient with eating if necessary   - Allow adequate time for meals  - Recommend/ encourage appropriate diets, oral nutritional supplements, and vitamin/mineral supplements  - Assess need for intravenous fluids  - Provide specific nutrition/hydration education as appropriate  - Include patient/family/caregiver in decisions related to nutrition  Outcome: Progressing     Problem: PAIN - ADULT  Goal: Verbalizes/displays adequate comfort level or baseline comfort level  Description: Interventions:  - Encourage patient to monitor pain and request assistance  - Assess pain using appropriate pain scale  - Administer analgesics based on type and severity of pain and evaluate response  - Implement non-pharmacological measures as appropriate and evaluate response  - Consider cultural and social influences on pain and pain management  - Notify physician/advanced practitioner if interventions unsuccessful or patient reports new pain  Outcome: Progressing     Problem: INFECTION - ADULT  Goal: Absence or prevention of progression during hospitalization  Description: INTERVENTIONS:  - Assess and monitor for signs and symptoms of infection  - Monitor lab/diagnostic results  - Monitor all insertion sites, i e  indwelling lines, tubes, and drains  - Administer medications as ordered  - Instruct and encourage patient and family to use good hand hygiene technique  - Identify and instruct in appropriate isolation precautions for identified infection/condition  Outcome: Progressing  Goal: Absence of fever/infection during neutropenic period  Description: INTERVENTIONS:  - Monitor WBC    Outcome: Progressing     Problem: SAFETY ADULT  Goal: Patient will remain free of falls  Description: INTERVENTIONS:  - Assess patient frequently for physical needs  -  Identify cognitive and physical deficits and behaviors that affect risk of falls    -  [place] fall precautions as indicated by assessment   - Educate patient/family on patient safety including physical limitations  - Instruct patient to call for assistance with activity based on assessment  - Modify environment to reduce risk of injury  - Consider OT/PT consult to assist with strengthening/mobility  Outcome: Progressing  Goal: Maintain or return to baseline ADL function  Description: INTERVENTIONS:  -  Assess patient's ability to carry out ADLs; assess patient's baseline for ADL function and identify physical deficits which impact ability to perform ADLs (bathing, care of mouth/teeth, toileting, grooming, dressing, etc )  - Assess/evaluate cause of self-care deficits   - Assess range of motion  - Assess patient's mobility; develop plan if impaired  - Assess patient's need for assistive devices and provide as appropriate  - Encourage maximum independence but intervene and supervise when necessary  - Involve family in performance of ADLs  - Assess for home care needs following discharge   - Consider OT consult to assist with ADL evaluation and planning for discharge  - Provide patient education as appropriate  Outcome: Progressing  Goal: Maintain or return mobility status to optimal level  Description: INTERVENTIONS:  - Assess patient's baseline mobility status (ambulation, transfers, stairs, etc )    - Identify cognitive and physical deficits and behaviors that affect mobility  - Identify mobility aids required to assist with transfers and/or ambulation (gait belt, sit-to-stand, lift, walker, cane, etc )  - [place] fall precautions as indicated by assessment  - Instruct patient to call for assistance with activity based on assessment  - Consider rehabilitation consult to assist with strengthening/weightbearing, etc   Outcome: Progressing     Problem: DISCHARGE PLANNING  Goal: Discharge to home or other facility with appropriate resources  Description: INTERVENTIONS:  - Identify barriers to discharge w/patient and caregiver  - Arrange for needed discharge resources and transportation as appropriate  - Identify discharge learning needs (meds, wound care, etc )  - Refer to Case Management Department for coordinating discharge planning if the patient needs post-hospital services based on physician/advanced practitioner order or complex needs related to functional status, cognitive ability, or social support system  Outcome: Progressing     Problem: Knowledge Deficit  Goal: Patient/family/caregiver demonstrates understanding of disease process, treatment plan, medications, and discharge instructions  Description: Complete learning assessment and assess knowledge base    Interventions:  - Provide teaching at level of understanding  - Provide teaching via preferred learning methods  Outcome: Progressing

## 2020-09-14 NOTE — PHYSICAL THERAPY NOTE
PT EVALUATION          09/14/20    1520   Note Type   Note type    Pain Assessment   Pain Assessment No pain   Home Living   Type of Home House  (2 claudia front, no rail; 1 claudia in garage w rail)   Home Layout One level   Bathroom Shower/Tub Walk-in shower   [address] [place] chair but not using   [address] [facility]  (RW;home O2)   Additional Comments Patient driving but not regularly due to decreased endurance with SOB with activity and lightheadedness recently   Prior Function   Level of [place] Independent with ADLs and functional mobility; Needs assistance with IADLs   Lives With Alone   Receives Help From Family/daughter assists   ADL Assistance Independent   IADLs Needs assistance   Comments Pt amb with/without cane inside;uses cane outside, patient holding onto furniture in home    Restrictions/Precautions   Other Precautions O2;Fall Risk  Chair, bed alarm   General   Additional Pertinent History Chart reviewed, patient admitted with syncope  Patient on his porch and reports lightheadedness and then "blacking out"  Lightheadedness is not new as per patient   Family/Caregiver Present No   Cognition   Overall Cognitive Status WFL   Arousal/Participation Cooperative   Orientation Level Oriented X4   Following Commands Follows all commands and directions without difficulty   RLE Assessment   RLE Assessment WFL  (3/3+)   LLE Assessment   LLE Assessment WFL  (3/3+)   Bed Mobility   Supine to Sit 5  Supervision   Additional items Increased time required   Sit to Supine 5  Supervision   Additional items Increased time required   Transfers   Sit to Stand 4  Minimal assistance   Additional items Assist x 1;Verbal cues; Increased time required   Stand to Sit 4 Minimal asssitance   Additional items Assist x 1;Verbal cues   Ambulation/Elevation   Gait pattern [place] flexed; Short stride   Gait Assistance Moderate assist   Additional items Assist x 1;Verbal cues; Tactile cues   Assistive Device Roller walker Distance 5 feet at bedside for safety with lightheadedness   Balance   Static Sitting Fair   Static Standing Fair -   Dynamic Standing Poor +   Ambulatory Poor +   Activity Tolerance   Activity Tolerance Patient limited by fatigue  (SOB and generalized weakness)   Assessment   Prognosis Good   Problem List Decreased strength;Decreased range of motion;Decreased endurance; Impaired balance;Decreased mobility; Decreased safety awareness;Decreased coordination   Assessment Patient seen for Physical Therapy evaluation  Patient admitted with syncope  Comorbidities affecting patient's physical performance include: COPD, AAT deficiency, HTN, BPH, [place], centrilobular emphysema, generalized weakness, liver disease, lung mass, PE, CRF w hypoxia, neuropathy, pulmonary HTN  Personal factors affecting patient at time of initial evaluation include: lives in one story house, ambulating with assistive device, stairs to enter home, inability to navigate community distances, inability to navigate level surfaces without external assistance, inability to perform dynamic tasks in community and limited home support  Prior to admission, patient was independent with functional mobility with cane or RW, independent with functional mobility without assistive device, independent with ADLS, requiring assist for IADLS, living alone in one story home with 2 steps to enter, ambulating household distance and ambulating community distances  Please find objective findings from Physical Therapy assessment regarding body systems outlined above with impairments and limitations including weakness, decreased ROM, impaired balance, decreased endurance, impaired coordination, gait deviations, decreased activity tolerance, decreased functional mobility tolerance, decreased safety awareness, fall risk and SOB upon exertion    The Barthel Index was used as a functional outcome tool presenting with a score of 40 today indicating marked limitations of functional mobility and ADLS  Patient's clinical presentation is currently unstable/unpredictable as seen in patient's presentation of vital sign response, increased fall risk, new onset of impairment of functional mobility, decreased endurance and new onset of weakness  Pt would benefit from continued Physical Therapy treatment to address deficits as defined above and maximize level of functional mobility  As demonstrated by objective findings, the assigned level of complexity for this evaluation is high  Goals   Patient Goals go home   STG Expiration Date 09/21/20   Short Term Goal #1 Transfers and gait with roller walker with supervision   Short Term Goal #2 Gait endurance to 80 feet, strength BLEs 3+/4- all to meet patient goal of returning home   LTG Expiration Date 09/28/20   Long Term Goal #1 Transfers and gait with roller walker independently   Long Term Goal #2 Gait endurance to functional household distances   Plan   Treatment/Interventions ADL retraining;Functional transfer training;LE strengthening/ROM; Elevations; Therapeutic exercise; Endurance training;Patient/family training;Equipment eval/education; Bed mobility;Gait training; Compensatory technique education   PT Frequency 5x/wk   Recommendation   Recommendation Short-term skilled PT   Equipment Recommended       Barthel Index   Feeding 5   Bathing 0   Grooming Score 0   Dressing Score 5   Bladder Score 10   Bowels Score 10   Toilet Use Score 5   Transfers (Bed/Chair) Score 5   Mobility (Level Surface) Score 0   Stairs Score 0   Barthel Index Score 40   Licensure   [place] License Number  Monika Darling [id number]      Time In:1505  Time Out:1520  Total Time: 15 min        S:  Patient states feeling weak  O: -exercise completed sitting on bed edge without lumbar support for core strengthening and balance training: LAQs, ankle pumps, hip flexion all 10 reps  -gait with roller walker with min assist of 1, endurance 10 feet with shortened stride length  A: patient unsteady and generally weak and will benefit from continued PT with progression as tolerated  P:  STR  Kirk Fernández [id number]

## 2020-09-15 ENCOUNTER — APPOINTMENT (INPATIENT)
Dept: NON INVASIVE DIAGNOSTICS | Facility: HOSPITAL | Age: 75
DRG: 312 | End: 2020-09-15
Payer: MEDICARE

## 2020-09-15 LAB
ANION GAP SERPL CALCULATED.3IONS-SCNC: 9 MMOL/L (ref 4–13)
BUN SERPL-MCNC: 14 MG/DL (ref 5–25)
CALCIUM SERPL-MCNC: 8.6 MG/DL (ref 8.3–10.1)
CHLORIDE SERPL-SCNC: 105 MMOL/L (ref 100–108)
CO2 SERPL-SCNC: 26 MMOL/L (ref 21–32)
CREAT SERPL-MCNC: 1.28 MG/DL (ref 0.6–1.3)
ERYTHROCYTE [DISTWIDTH] IN BLOOD BY AUTOMATED COUNT: 13 % (ref 11.6–15.1)
GFR SERPL CREATININE-BSD FRML MDRD: 54 ML/MIN/1.73SQ M
GLUCOSE SERPL-MCNC: 112 MG/DL (ref 65–140)
HCT VFR BLD AUTO: 39.5 % (ref 36.5–49.3)
HGB BLD-MCNC: 13 G/DL (ref 12–17)
MAGNESIUM SERPL-MCNC: 2 MG/DL (ref 1.6–2.6)
MCH RBC QN AUTO: 31.8 PG (ref 26.8–34.3)
MCHC RBC AUTO-ENTMCNC: 32.9 G/DL (ref 31.4–37.4)
MCV RBC AUTO: 97 FL (ref 82–98)
PHOSPHATE SERPL-MCNC: 3.1 MG/DL (ref 2.3–4.1)
PLATELET # BLD AUTO: 118 THOUSANDS/UL (ref 149–390)
PMV BLD AUTO: 9.4 FL (ref 8.9–12.7)
POTASSIUM SERPL-SCNC: 3.8 MMOL/L (ref 3.5–5.3)
RBC # BLD AUTO: 4.09 MILLION/UL (ref 3.88–5.62)
SODIUM SERPL-SCNC: 140 MMOL/L (ref 136–145)
WBC # BLD AUTO: 8.75 THOUSAND/UL (ref 4.31–10.16)

## 2020-09-15 PROCEDURE — 94760 N-INVAS EAR/PLS OXIMETRY 1: CPT

## 2020-09-15 PROCEDURE — 99231 SBSQ HOSP IP/OBS SF/LOW 25: CPT | Performed by: FAMILY MEDICINE

## 2020-09-15 PROCEDURE — 83735 ASSAY OF MAGNESIUM: CPT | Performed by: STUDENT IN AN ORGANIZED HEALTH CARE EDUCATION/TRAINING PROGRAM

## 2020-09-15 PROCEDURE — 93660 TILT TABLE EVALUATION: CPT

## 2020-09-15 PROCEDURE — 80048 BASIC METABOLIC PNL TOTAL CA: CPT | Performed by: STUDENT IN AN ORGANIZED HEALTH CARE EDUCATION/TRAINING PROGRAM

## 2020-09-15 PROCEDURE — 84100 ASSAY OF PHOSPHORUS: CPT | Performed by: STUDENT IN AN ORGANIZED HEALTH CARE EDUCATION/TRAINING PROGRAM

## 2020-09-15 PROCEDURE — 99232 SBSQ HOSP IP/OBS MODERATE 35: CPT | Performed by: INTERNAL MEDICINE

## 2020-09-15 PROCEDURE — 85027 COMPLETE CBC AUTOMATED: CPT | Performed by: STUDENT IN AN ORGANIZED HEALTH CARE EDUCATION/TRAINING PROGRAM

## 2020-09-15 PROCEDURE — 97167 OT EVAL HIGH COMPLEX 60 MIN: CPT

## 2020-09-15 PROCEDURE — 94640 AIRWAY INHALATION TREATMENT: CPT

## 2020-09-15 RX ORDER — MIDODRINE HYDROCHLORIDE 5 MG/1
2.5 TABLET ORAL
Status: DISCONTINUED | OUTPATIENT
Start: 2020-09-16 | End: 2020-09-16 | Stop reason: HOSPADM

## 2020-09-15 RX ADMIN — PREDNISONE 10 MG: 10 TABLET ORAL at 08:55

## 2020-09-15 RX ADMIN — LORAZEPAM 1 MG: 1 TABLET ORAL at 16:35

## 2020-09-15 RX ADMIN — CHOLESTYRAMINE 4 G: 4 POWDER, FOR SUSPENSION ORAL at 08:52

## 2020-09-15 RX ADMIN — BUDESONIDE 0.5 MG: 0.5 INHALANT RESPIRATORY (INHALATION) at 07:25

## 2020-09-15 RX ADMIN — FORMOTEROL FUMARATE DIHYDRATE 20 MCG: 20 SOLUTION RESPIRATORY (INHALATION) at 20:02

## 2020-09-15 RX ADMIN — IPRATROPIUM BROMIDE AND ALBUTEROL SULFATE 3 ML: 2.5; .5 SOLUTION RESPIRATORY (INHALATION) at 16:02

## 2020-09-15 RX ADMIN — ENOXAPARIN SODIUM 40 MG: 40 INJECTION SUBCUTANEOUS at 08:53

## 2020-09-15 RX ADMIN — FLUTICASONE PROPIONATE 2 SPRAY: 50 SPRAY, METERED NASAL at 08:53

## 2020-09-15 RX ADMIN — IPRATROPIUM BROMIDE AND ALBUTEROL SULFATE 3 ML: 2.5; .5 SOLUTION RESPIRATORY (INHALATION) at 07:25

## 2020-09-15 RX ADMIN — GABAPENTIN 300 MG: 300 CAPSULE ORAL at 16:36

## 2020-09-15 RX ADMIN — LORAZEPAM 1 MG: 1 TABLET ORAL at 23:02

## 2020-09-15 RX ADMIN — CHOLESTYRAMINE 4 G: 4 POWDER, FOR SUSPENSION ORAL at 17:02

## 2020-09-15 RX ADMIN — PANTOPRAZOLE SODIUM 40 MG: 40 TABLET, DELAYED RELEASE ORAL at 16:35

## 2020-09-15 RX ADMIN — BUDESONIDE 0.5 MG: 0.5 INHALANT RESPIRATORY (INHALATION) at 20:02

## 2020-09-15 RX ADMIN — AMLODIPINE BESYLATE 5 MG: 5 TABLET ORAL at 08:52

## 2020-09-15 RX ADMIN — FORMOTEROL FUMARATE DIHYDRATE 20 MCG: 20 SOLUTION RESPIRATORY (INHALATION) at 07:25

## 2020-09-15 RX ADMIN — BUSPIRONE HYDROCHLORIDE 10 MG: 10 TABLET ORAL at 08:52

## 2020-09-15 RX ADMIN — PANTOPRAZOLE SODIUM 40 MG: 40 TABLET, DELAYED RELEASE ORAL at 08:54

## 2020-09-15 RX ADMIN — IPRATROPIUM BROMIDE AND ALBUTEROL SULFATE 3 ML: 2.5; .5 SOLUTION RESPIRATORY (INHALATION) at 19:55

## 2020-09-15 RX ADMIN — GABAPENTIN 300 MG: 300 CAPSULE ORAL at 08:54

## 2020-09-15 RX ADMIN — IPRATROPIUM BROMIDE AND ALBUTEROL SULFATE 3 ML: 2.5; .5 SOLUTION RESPIRATORY (INHALATION) at 12:23

## 2020-09-15 RX ADMIN — BUSPIRONE HYDROCHLORIDE 10 MG: 10 TABLET ORAL at 16:36

## 2020-09-15 RX ADMIN — FAMOTIDINE 20 MG: 20 TABLET ORAL at 08:53

## 2020-09-15 RX ADMIN — GABAPENTIN 300 MG: 300 CAPSULE ORAL at 21:01

## 2020-09-15 RX ADMIN — GUAIFENESIN 200 MG: 100 SOLUTION ORAL at 21:01

## 2020-09-15 RX ADMIN — BUSPIRONE HYDROCHLORIDE 10 MG: 10 TABLET ORAL at 21:01

## 2020-09-15 RX ADMIN — GUAIFENESIN 200 MG: 100 SOLUTION ORAL at 08:54

## 2020-09-15 NOTE — PLAN OF CARE
Problem: Potential for Falls  Goal: Patient will remain free of falls  Description: INTERVENTIONS:  - Assess patient frequently for physical needs  -  Identify cognitive and physical deficits and behaviors that affect risk of falls  -  [place] fall precautions as indicated by assessment   - Educate patient/family on patient safety including physical limitations  - Instruct patient to call for assistance with activity based on assessment  - Modify environment to reduce risk of injury  - Consider OT/PT consult to assist with strengthening/mobility  Outcome: Progressing     Problem: Nutrition/Hydration-ADULT  Goal: Nutrient/Hydration intake appropriate for improving, restoring or maintaining nutritional needs  Description: Monitor and assess patient's nutrition/hydration status for malnutrition  Collaborate with interdisciplinary team and initiate plan and interventions as ordered  Monitor patient's weight and dietary intake as ordered or per policy  Utilize nutrition screening tool and intervene as necessary  Determine patient's food preferences and provide high-protein, high-caloric foods as appropriate       INTERVENTIONS:  - Monitor oral intake, urinary output, labs, and treatment plans  - Assess nutrition and hydration status and recommend course of action  - Evaluate amount of meals eaten  - Assist patient with eating if necessary   - Allow adequate time for meals  - Recommend/ encourage appropriate diets, oral nutritional supplements, and vitamin/mineral supplements  - Assess need for intravenous fluids  - Provide specific nutrition/hydration education as appropriate  - Include patient/family/caregiver in decisions related to nutrition  Outcome: Progressing     Problem: PAIN - ADULT  Goal: Verbalizes/displays adequate comfort level or baseline comfort level  Description: Interventions:  - Encourage patient to monitor pain and request assistance  - Assess pain using appropriate pain scale  - Administer analgesics based on type and severity of pain and evaluate response  - Implement non-pharmacological measures as appropriate and evaluate response  - Consider cultural and social influences on pain and pain management  - Notify physician/advanced practitioner if interventions unsuccessful or patient reports new pain  Outcome: Progressing     Problem: INFECTION - ADULT  Goal: Absence or prevention of progression during hospitalization  Description: INTERVENTIONS:  - Assess and monitor for signs and symptoms of infection  - Monitor lab/diagnostic results  - Monitor all insertion sites, i e  indwelling lines, tubes, and drains  - Administer medications as ordered  - Instruct and encourage patient and family to use good hand hygiene technique  - Identify and instruct in appropriate isolation precautions for identified infection/condition  Outcome: Progressing  Goal: Absence of fever/infection during neutropenic period  Description: INTERVENTIONS:  - Monitor WBC    Outcome: Completed     Problem: SAFETY ADULT  Goal: Patient will remain free of falls  Description: INTERVENTIONS:  - Assess patient frequently for physical needs  -  Identify cognitive and physical deficits and behaviors that affect risk of falls    -  [place] fall precautions as indicated by assessment   - Educate patient/family on patient safety including physical limitations  - Instruct patient to call for assistance with activity based on assessment  - Modify environment to reduce risk of injury  - Consider OT/PT consult to assist with strengthening/mobility  Outcome: Progressing  Goal: Maintain or return to baseline ADL function  Description: INTERVENTIONS:  -  Assess patient's ability to carry out ADLs; assess patient's baseline for ADL function and identify physical deficits which impact ability to perform ADLs (bathing, care of mouth/teeth, toileting, grooming, dressing, etc )  - Assess/evaluate cause of self-care deficits   - Assess range of motion  - Assess patient's mobility; develop plan if impaired  - Assess patient's need for assistive devices and provide as appropriate  - Encourage maximum independence but intervene and supervise when necessary  - Involve family in performance of ADLs  - Assess for home care needs following discharge   - Consider OT consult to assist with ADL evaluation and planning for discharge  - Provide patient education as appropriate  Outcome: Progressing  Goal: Maintain or return mobility status to optimal level  Description: INTERVENTIONS:  - Assess patient's baseline mobility status (ambulation, transfers, stairs, etc )    - Identify cognitive and physical deficits and behaviors that affect mobility  - Identify mobility aids required to assist with transfers and/or ambulation (gait belt, sit-to-stand, lift, walker, cane, etc )  - [place] fall precautions as indicated by assessment  - Instruct patient to call for assistance with activity based on assessment  - Consider rehabilitation consult to assist with strengthening/weightbearing, etc   Outcome: Progressing     Problem: DISCHARGE PLANNING  Goal: Discharge to home or other facility with appropriate resources  Description: INTERVENTIONS:  - Identify barriers to discharge w/patient and caregiver  - Arrange for needed discharge resources and transportation as appropriate  - Identify discharge learning needs (meds, wound care, etc )  - Refer to Case Management Department for coordinating discharge planning if the patient needs post-hospital services based on physician/advanced practitioner order or complex needs related to functional status, cognitive ability, or social support system  Outcome: Progressing     Problem: Knowledge Deficit  Goal: Patient/family/caregiver demonstrates understanding of disease process, treatment plan, medications, and discharge instructions  Description: Complete learning assessment and assess knowledge base    Interventions:  - Provide teaching at level of understanding  - Provide teaching via preferred learning methods  Outcome: Progressing     Problem: RESPIRATORY - ADULT  Goal: Achieves optimal ventilation and oxygenation  Description: INTERVENTIONS:  - Assess for changes in respiratory status  - Assess for changes in mentation and behavior  - Position to facilitate oxygenation and minimize respiratory effort  - Oxygen administered by appropriate delivery if ordered  - Initiate smoking cessation education as indicated  - Encourage broncho-pulmonary hygiene including cough, deep breathe, Incentive Spirometry  - Assess the need for suctioning and aspirate as needed  - Assess and instruct to report SOB or any respiratory difficulty  - Respiratory Therapy support as indicated  Outcome: Progressing     Problem: Neurological Deficit  Goal: Neurological status is stable or improving  Description: Interventions:  - Monitor and assess patient's level of consciousness, motor function, sensory function, and level of assistance needed for ADLs  - Monitor and report changes from baseline  Collaborate with interdisciplinary team to initiate plan and implement interventions as ordered  - Provide and maintain a safe environment  - Consider seizure precautions  - Consider fall precautions  - Consider aspiration precautions  - Consider bleeding precautions  Outcome: Progressing     Problem: Activity Intolerance/Impaired Mobility  Goal: Mobility/activity is maintained at optimum level for patient  Description: Interventions:  - Assess and monitor patient  barriers to mobility and need for assistive/adaptive devices  - Assess patient's emotional response to limitations  - Collaborate with interdisciplinary team and initiate plans and interventions as ordered  - Encourage independent activity per ability   - Maintain proper body alignment  - Perform active/passive rom as tolerated/ordered    - Plan activities to conserve energy   - Turn patient as appropriate  Outcome: Progressing     Problem: Communication Impairment  Goal: Ability to express needs and understand communication  Description: Assess patient's communication skills and ability to understand information  Patient will demonstrate use of effective communication techniques, alternative methods of communication and understanding even if not able to speak  - Encourage communication and provide alternate methods of communication as needed  - Collaborate with case management/ for discharge needs  - Include patient/family/caregiver in decisions related to communication  Outcome: Completed     Problem: Potential for Aspiration  Goal: Non-ventilated patient's risk of aspiration is minimized  Description: Assess and monitor vital signs, respiratory status, and labs (WBC)  Monitor for signs of aspiration (tachypnea, cough, rales, wheezing, cyanosis, fever)  - Assess and monitor patient's ability to swallow  - Place patient up in chair to eat if possible  - HOB up at 90 degrees to eat if unable to get patient up into chair   - Supervise patient during oral intake  - Instruct patient/ family to take small bites  - Instruct patient/ family to take small single sips when taking liquids  - Follow patient-specific strategies generated by speech pathologist   Outcome: Progressing     Problem: Nutrition  Goal: Nutrition/Hydration status is improving  Description: Monitor and assess patient's nutrition/hydration status for malnutrition (ex- brittle hair, bruises, dry skin, pale skin and conjunctiva, muscle wasting, smooth red tongue, and disorientation)  Collaborate with interdisciplinary team and initiate plan and interventions as ordered  Monitor patient's weight and dietary intake as ordered or per policy  Utilize nutrition screening tool and intervene per policy  Determine patient's food preferences and provide high-protein, high-caloric foods as appropriate       - Assist patient with eating   - Allow adequate time for meals   - Encourage patient to take dietary supplement as ordered  - Collaborate with clinical nutritionist   - Include patient/family/caregiver in decisions related to nutrition    Outcome: Completed

## 2020-09-15 NOTE — PROGRESS NOTES
Progress Note - Cardiology   [facility]     Fransisco Scott 67 y o  male MRN: [id number]  : 1945  Unit/Bed#: [address] [address] Encounter: [id number]    Assessment and Plan:   1  Syncope/orthostatic hypotension:  Patient with positive tilt-table testing for orthostatic hypotension  Testing was done off his antihypertensive medication  -  will discontinue patient's Norvasc and Dyazide    -  add midodrine 2 5 mg 1st dose at time of arising, 2nd dose should be taken with lunch around 11:30-12:00 p m  And evening  dose should be taken no later than 5:00 p m  Gerhardt Sep Patient should avoid taking this medication within 4 hours of going to bed  -  patient should drink at least 2 L of fluid, prefer we non caffeinated daily    -  continue to wear thigh-high support stockings during the waking hour and may remove at bedtime    -  change positions slowly    -  elevate head of bed at least 30° when sleeping to help avoid supine hypertension  2   BPH:  Patient notes he does not receive any benefit with taking Flomax  Would discontinue this medication due to his anticholinergic effect and his ongoing dizziness  3  COPD with alpha 1 antitrypsin deficiency    4  History of hypertension:  Will monitor patient off medications  Goal resting sitting blood pressure would be systolic of [phone]     5  Anxiety depression    Subjective / Objective:   Patient seen and examined  Patient's blood pressure today has been stable  He continues to have lightheadedness even while resting in bed  Patient underwent tilt-table testing this morning, off his medications  Patient was tested for 30 minutes period of time  Trending during study demonstrated a longer patient stood the lower his blood pressure became  Over 30 minutes  Time patient's blood pressure dropped approximately 40 mm Hg systolic  Patient did experience lightheadedness  Patient did not have any cardio inhibition or pauses      Vitals: Blood pressure 138/73, pulse 76, temperature 99 4 °F (37 4 °C), resp  rate 19, height 6' 5" (1 956 m), weight 84 4 kg (186 lb 1 1 oz), SpO2 95 %  Vitals:    09/13/20 1559 09/13/20 2030   Weight: 84 4 kg (186 lb) 84 4 kg (186 lb 1 1 oz)     Body mass index is 22 06 kg/m²  BP Readings from Last 3 Encounters:   09/15/20 138/73   09/11/20 150/78   08/26/20 (!) 172/99     Orthostatic Blood Pressures      Most Recent Value   Blood Pressure  138/73 filed at 09/15/2020 1139   Patient Position - Orthostatic VS  Sitting filed at 09/15/2020 0850        I/O       09/13 0701 - 09/14 0700 09/14 0701 - 09/15 0700 09/15 0701 - 09/16 0700    P  O  120 460 300    I V  (mL/kg) 750 (8 9)      Total Intake(mL/kg) 870 (10 3) 460 (5 5) 300 (3 6)    Urine (mL/kg/hr) 450      Total Output 450      Net +420 +460 +300           Unmeasured Urine Occurrence 3 x 4 x         Invasive Devices     Peripheral Intravenous Line            Peripheral IV Left Antecubital -- days                  Intake/Output Summary (Last 24 hours) at 9/15/2020 1510  Last data filed at 9/15/2020 0850  Gross per 24 hour   Intake 520 ml   Output --   Net 520 ml         Physical Exam:   Physical Exam  Vitals signs and nursing note reviewed  Constitutional:       Appearance: Normal appearance  He is well-developed and normal weight  HENT:      Head: Normocephalic  Right Ear: External ear normal       Left Ear: External ear normal       Nose: Nose normal    Eyes:      General: No scleral icterus  Right eye: No discharge  Left eye: No discharge  Pupils: Pupils are equal, round, and reactive to light  Neck:      Musculoskeletal: Normal range of motion and neck supple  Thyroid: No thyromegaly  Cardiovascular:      Rate and Rhythm: Normal rate and regular rhythm  No extrasystoles are present  Pulses: Normal pulses  Heart sounds: No murmur     Pulmonary:      Effort: Pulmonary effort is normal       Breath sounds: Examination of the right-upper field reveals decreased breath sounds  Examination of the left-upper field reveals decreased breath sounds  Examination of the right-middle field reveals decreased breath sounds  Examination of the right-lower field reveals decreased breath sounds  Examination of the left-lower field reveals decreased breath sounds  Decreased breath sounds present  No wheezing, rhonchi or rales  Abdominal:      General: Bowel sounds are normal       Palpations: Abdomen is soft  Musculoskeletal:         General: No swelling or tenderness  Right lower leg: No edema  Left lower leg: No edema  Skin:     General: Skin is warm and dry  Capillary Refill: Capillary refill takes less than 2 seconds  Neurological:      General: No focal deficit present  Mental Status: He is alert and oriented to person, place, and time  Mental status is at baseline  Psychiatric:         Attention and Perception: Attention normal          Mood and Affect: Mood normal          Speech: Speech normal          Behavior: Behavior normal          Thought Content:  Thought content normal          Cognition and Memory: Cognition normal          Judgment: Judgment normal                Medications/ Allergies:     Current Facility-Administered Medications   Medication Dose Route Frequency Provider Last Rate    acetaminophen  650 mg Oral Q6H PRN Matthwe Gurrola DO      budesonide  0 5 mg Nebulization BID Shana Magallon DO      busPIRone  10 mg Oral TID Matthew Gurrola DO      cholestyramine sugar free  4 g Oral BID Matthew Gurrola DO      enoxaparin  40 mg Subcutaneous Daily Matthew Izabela, [facility]      famotidine  20 mg Oral Daily Matthewzaki Gurrola [facility]      fluticasone  2 spray Nasal Daily Matthew Izabela, [facility]      formoterol  20 mcg Nebulization BID Matthewzaki Gurrola [facility]      gabapentin  300 mg Oral TID Matthew Gurrola DO      guaiFENesin  200 mg Oral BID Shana Magallon DO      ipratropium-albuterol  3 mL Nebulization 4x Daily Gearld Detroit, DO      LORazepam  1 mg Oral BID Cyndi Nixon MD      [START ON 9/16/2020] midodrine  2 5 mg Oral TID AC TOPHER Hall      pantoprazole  40 mg Oral BID Gearld Detroit, DO      predniSONE  10 mg Oral Daily Gearld [place], DO       acetaminophen, 650 mg, Q6H PRN      Allergies   Allergen Reactions    Chantix [Varenicline]      Caused prostate infection       VTE Pharmacologic Prophylaxis:   Sequential compression device (Venodyne)     Labs:   Troponins:  Results from last 7 days   Lab Units 09/13/20  1603   TROPONIN I ng/mL <0 02     CBC with diff:  Results from last 7 days   Lab Units 09/15/20  0515 09/14/20  0512 09/13/20  1603   WBC Thousand/uL 8 75 8 71 8 14   HEMOGLOBIN g/dL 13 0 13 9 14 2   HEMATOCRIT % 39 5 42 6 41 8   MCV fL 97 100* 97   PLATELETS Thousands/uL 118* 114* 146*   MCH pg 31 8 32 5 32 9   MCHC g/dL 32 9 32 6 34 0   RDW % 13 0 12 8 13 0   MPV fL 9 4 10 2 9 4   NRBC AUTO /100 WBCs  --  0 0     CMP:  Results from last 7 days   Lab Units 09/15/20  0515 09/14/20  0512 09/13/20  1603   SODIUM mmol/L 140 139 138   POTASSIUM mmol/L 3 8 3 9 4 1   CHLORIDE mmol/L 105 105 104   CO2 mmol/L 26 21 26   ANION GAP mmol/L 9 13 8   BUN mg/dL 14 17 23   CREATININE mg/dL 1 28 1 21 1 27   CALCIUM mg/dL 8 6 8 2* 8 6   AST U/L  --   --  56*   ALT U/L  --   --  121*   ALK PHOS U/L  --   --  73   TOTAL PROTEIN g/dL  --   --  6 7   ALBUMIN g/dL  --   --  3 6   TOTAL BILIRUBIN mg/dL  --   --  1 10*   EGFR ml/min/1 73sq m 54 58 55     Magnesium:  Results from last 7 days   Lab Units 09/15/20  0515 09/14/20  0512   MAGNESIUM mg/dL 2 0 2 1     Coags:  Results from last 7 days   Lab Units 09/13/20  1603   PTT seconds 25   INR  1 03       Imaging & Testing   I have personally reviewed pertinent reports      Cta Head And Neck With And Without Contrast    Result Date: 9/13/2020  Narrative: CTA NECK AND BRAIN WITH AND WITHOUT CONTRAST INDICATION: Syncope, simple, normal neuro exam Dizziness, persistent/recurrent, cardiac or vascular cause suspected COMPARISON:   None  TECHNIQUE:  Routine CT imaging of the Brain without contrast   Post contrast imaging was performed after administration of iodinated contrast through the neck and brain  Post contrast axial 0 625 mm images timed to opacify the arterial system  3D rendering was performed on an independent workstation  MIP reconstructions performed  Coronal reconstructions were performed of the noncontrast portion of the brain  Radiation dose length product (DLP) for this visit:  1356 29 mGy-cm   This examination, like all CT scans performed in the [facility], was performed utilizing techniques to minimize radiation dose exposure, including the use of iterative reconstruction and automated exposure control  IV Contrast:  85 mL of iohexol (OMNIPAQUE)  IMAGE QUALITY:   Diagnostic FINDINGS: NONCONTRAST BRAIN PARENCHYMA:  No intracranial mass, mass effect or midline shift  No CT signs of acute infarction  No acute parenchymal hemorrhage  VENTRICLES AND EXTRA-AXIAL SPACES:  Normal for the patient's age  VISUALIZED ORBITS AND PARANASAL SINUSES:  Unremarkable  CERVICAL VASCULATURE AORTIC ARCH AND GREAT VESSELS:  Normal aortic arch and great vessel origins  Normal visualized subclavian vessels  RIGHT VERTEBRAL ARTERY CERVICAL SEGMENT:  Normal origin  The vessel is normal in caliber throughout the neck  LEFT VERTEBRAL ARTERY CERVICAL SEGMENT:  Normal origin  The vessel is normal in caliber throughout the neck  RIGHT EXTRACRANIAL CAROTID SEGMENT:  Moderate atherosclerotic disease of the bifurcation  LEFT EXTRACRANIAL CAROTID SEGMENT:  Moderate to severe axial atherosclerotic disease of the bifurcation  Approximately 68% atherosclerotic plaque stenosis proximal left cervical ICA  NASCET criteria was used to determine the degree of internal carotid artery diameter stenosis   INTRACRANIAL VASCULATURE INTERNAL CAROTID ARTERIES: Normal enhancement of the intracranial portions of the internal carotid arteries  Normal ophthalmic artery origins  Normal ICA terminus  ANTERIOR CIRCULATION:  Symmetric A1 segments and anterior cerebral arteries with normal enhancement  Normal anterior communicating artery  MIDDLE CEREBRAL ARTERY CIRCULATION:  M1 segment and middle cerebral artery branches demonstrate normal enhancement bilaterally  DISTAL VERTEBRAL ARTERIES:  Normal distal vertebral arteries  Posterior inferior cerebellar artery origins are normal  Normal vertebral basilar junction  BASILAR ARTERY:  Basilar artery is normal in caliber  Normal superior cerebellar arteries  POSTERIOR CEREBRAL ARTERIES: Both posterior cerebral arteries arises from the basilar tip  Both arteries demonstrate normal enhancement  Normal posterior communicating arteries  DURAL VENOUS SINUSES:  Normal  NON VASCULAR ANATOMY BONY STRUCTURES:  No acute osseous abnormality  SOFT TISSUES OF THE NECK:  Normal  THORACIC INLET:  Right upper lobe 3 mm lung nodule, follow-up unenhanced chest CT in 12 months  Emphysematous lung changes  Impression: 1  No evidence of acute intracranial hemorrhage  2  Approximately 68% atherosclerotic plaque stenosis proximal left cervical ICA  3  Right upper lobe 3 mm lung nodule, follow-up unenhanced chest CT in 12 months   Workstation performed: [id number]       Cardiac testing:   Results for orders placed during the hospital encounter of 19   Echo complete with contrast if indicated    Lainey Owen   [address] [address]  [phone]    Transthoracic Echocardiogram  2D, M-mode, Doppler, and Color Doppler    Study date:  2019    Patient: Sherry Yarbrough  MR number: [id number]  Account number: [de-identified]  : 1945  Age: 75 years  Gender: Male  Status: Inpatient  Location: Bedside  Height: 77 in  Weight: 186 6 lb  BP: 124/ 72 mmHg    Indications: Dyspnea    Diagnoses: R06 00 - Dyspnea, unspecified    Sonographer:  JOSE Lee  Primary Physician:  Sharon Moise MD  Referring Physician:  Junella Sandifer, PA-C  Group:  [facility]  Interpreting Physician:  Gabe Bridges DO    SUMMARY    LEFT VENTRICLE:  Systolic function was normal by visual assessment  Ejection fraction was estimated to be 60 %  There were no regional wall motion abnormalities  There was moderate concentric hypertrophy  Doppler parameters were consistent with abnormal left ventricular relaxation (grade 1 diastolic dysfunction)  RIGHT VENTRICLE:  The ventricle was mildly dilated  RIGHT ATRIUM:  The atrium was mildly dilated  MITRAL VALVE:  There was mild regurgitation  AORTIC VALVE:  There was no evidence for stenosis  There was no regurgitation  TRICUSPID VALVE:  There was mild regurgitation  Pulmonary artery systolic pressure was mildly to moderately increased  PERICARDIUM:  A small pericardial effusion was identified  HISTORY: PRIOR HISTORY: HTN, COPD, Emphysema, IBS, Skin Cancer, BPH    PROCEDURE: The procedure was performed at the bedside  This was a routine study  The transthoracic approach was used  The study included complete 2D imaging, M-mode, complete spectral Doppler, and color Doppler  The heart rate was 73 bpm,  at the start of the study  Image quality was adequate  LEFT VENTRICLE: Size was normal  Systolic function was normal by visual assessment  Ejection fraction was estimated to be 60 %  There were no regional wall motion abnormalities  There was moderate concentric hypertrophy  DOPPLER: Doppler  parameters were consistent with abnormal left ventricular relaxation (grade 1 diastolic dysfunction)  RIGHT VENTRICLE: The ventricle was mildly dilated  Systolic function was normal     LEFT ATRIUM: The atrium was mildly dilated  No thrombus was identified  RIGHT ATRIUM: The atrium was mildly dilated      MITRAL VALVE: There was annular calcification  Valve structure was normal  There was normal leaflet separation  No echocardiographic evidence for prolapse  DOPPLER: The transmitral velocity was within the normal range  There was no  evidence for stenosis  There was mild regurgitation  AORTIC VALVE: The valve was trileaflet  Leaflets exhibited normal thickness, calcification, and normal cuspal separation  DOPPLER: Transaortic velocity was within the normal range  There was no evidence for stenosis  There was no  regurgitation  TRICUSPID VALVE: The valve structure was normal  There was normal leaflet separation  DOPPLER: The transtricuspid velocity was within the normal range  There was mild regurgitation  Pulmonary artery systolic pressure was mildly to  moderately increased  Estimated peak PA pressure was 51 mmHg  PULMONIC VALVE: Leaflets exhibited normal thickness, no calcification, and normal cuspal separation  DOPPLER: The transpulmonic velocity was within the normal range  There was no regurgitation  PERICARDIUM: There was no thickening  A small pericardial effusion was identified  AORTA: The root exhibited normal size and fibrocalcific change  PULMONARY ARTERY: The size was normal  The morphology appeared normal     SYSTEM MEASUREMENT TABLES    2D mode  AoR Diam 2D: 3 5 cm  LA Diam (2D): 4 cm  LA/Ao (2D): 1 14  FS (2D Teich): 32 6 %  IVSd (2D): 1 3 cm  LVDEV: 100 cmï¾³  LVESV: 39 1 cmï¾³  LVIDd(2D): 4 66 cm  LVISd (2D): 3 14 cm  LVPWd (2D): 1 27 cm  SV (Teich): 60 9 cmï¾³    Apical four chamber  LVEF A4C: 56 %    Unspecified Scan Mode  MV Peak A Myron: 888 mm/s  MV Peak E Myron   Mean: 775 mm/s  MVA (PHT): 3 86 cmï¾²  PHT: 57 ms  Max P mm[Hg]  V Max: 3270 mm/s  Vmax: 3270 mm/s  RA Area: 17 4 cmï¾²  RA Volume: 41 5 cmï¾³  TAPSE: 2 2 cm    [employer] Accredited Echocardiography Laboratory    Prepared and electronically signed by    João Hunt DO  Signed 2019 10:23:48         Modesto Sinclair TOPHER        "This note has been constructed using a voice recognition system  Therefore there may be syntax, spelling, and/or grammatical errors   Please call if you have any questions  "

## 2020-09-15 NOTE — OCCUPATIONAL THERAPY NOTE
Occupational Therapy Evaluation       09/15/20 1120   Note Type   Note type Eval only   Restrictions/Precautions   Other Precautions Fall Risk   Pain Assessment   Pain Assessment Tool 0-10   Pain Score No Pain   Home Living   Type of [address] One level;Stairs to enter with rails  ( 2 TEMI)   Bathroom Shower/Tub Walk-in shower   Bathroom Toilet Standard   Bathroom Equipment Shower chair  ((does not use shower chair))   Home Equipment Walker;Cane  (Home O2)   Additional Comments Per patient, PTA ambulating in the home with or without cane, furniture surfing, uses cane when outside the home  Mostly independent in ADLs, has assist from daughter for iADLs (grocery shopping), patient drives but reports doing so less frequently lately   Prior Function   Level of [place] Independent with ADLs and functional mobility; Needs assistance with IADLs   Lives With Alone   Receives Help From Family   ADL Assistance Independent   IADLs Needs assistance   Comments Patient admitted s/p syncopal episode; reporting to OT today that lightheadedness symptoms have been consistent, not gotten much better since hospital admission   ADL   Eating Assistance 5  Supervision/Setup   Grooming Assistance 5  [id number] [facility] Deficit   (Orma Rolls scrub pants with min assist)   [address]  4  Minimal Assistance   Bed Mobility   Supine to Sit 5  Supervision   Sit to Supine 5  Supervision   Transfers   Sit to Stand 4  Minimal assistance   Additional items Assist x 1   Stand to Sit 5  Supervision   Additional items Assist x 1;Verbal cues   Stand pivot 4  Minimal assistance   Additional items Assist x 1;Verbal cues  (RW)   Functional Mobility   Functional Mobility 3  Moderate assistance  (RW)   Additional Comments Patient ambulated short household distance in room, overall min assist with RW, one lateral LOB requiring mod assist to correct   Patient with increased WOB after minimal distance ambulation however SpO2 WFL   Balance   Static Sitting Fair   Dynamic Sitting Fair   Static Standing Fair -   Dynamic Standing Poor +   Activity Tolerance   Activity Tolerance Patient limited by fatigue; Other (Comment)  (Limited by lightheadedness)   RUE Assessment   RUE Assessment WFL   LUE Assessment   LUE Assessment WFL   Cognition   Overall Cognitive Status WFL   Arousal/Participation Alert; Cooperative   Attention Within functional limits   Orientation Level Oriented X4   Memory Within functional limits   Following Commands Follows all commands and directions without difficulty   Assessment   Limitation Decreased ADL status; Decreased UE strength;Decreased Safe judgement during ADL;Decreased endurance;Decreased self-care trans;Decreased high-level ADLs   Prognosis Good   Assessment Patient evaluated by Occupational Therapy  Patient admitted with Syncope  The patients occupational profile, medical and therapy history includes a extensive additional review of physical, cognitive, or psychosocial history related to current functional performance  Comorbidities affecting functional mobility and ADLS include: emphysema, arthritis, BPH, IBS, COPD, MRSA, cancer  Prior to admission, patient was independent with functional mobility with and without cane, independent with ADLS and requiring assist for IADLS  The evaluation identifies the following performance deficits: weakness, impaired balance, decreased endurance, increased fall risk, new onset of impairment of functional mobility, decreased ADLS, decreased IADLS, decreased activity tolerance, decreased safety awareness, SOB upon exertion and decreased strength, that result in activity limitations and/or participation restrictions   This evaluation requires clinical decision making of high complexity, because the patient presents with comorbidites that affect occupational performance and required significant modification of tasks or assistance with consideration of multiple treatment options  The Barthel Index was used as a functional outcome tool presenting with a score of 45, indicating marked limitations of functional mobility and ADLS  Patient will benefit from skilled Occupational Therapy services to address above deficits and facilitate a safe return to prior level of function  Goals   Patient Goals 'to move more' decrease lightheadedness   STG Time Frame   (1-7 days)   Short Term Goal  Goals established to promote patient goal of moving more:  Patient will increase standing tolerance to 5 minutes during ADL task to decrease assistance level and decrease fall risk; Patient will increase bed mobility to independent in preparation for ADLS and transfers; Patient will increase functional mobility to and from bathroom with rolling walker with min assist to increase performance with ADLS and to use a toilet; Patient will tolerate 5 minutes of UE ROM/strengthening to increase general activity tolerance and performance in ADLS/IADLS; Patient will improve functional activity tolerance to 5 minutes of sustained functional tasks to increase participation in basic self-care and decrease assistance level;  Patient will be able to to verbalize understanding and perform energy conservation/proper body mechanics during ADLS and functional mobility at least 75% of the time with minimal cueing to decrease signs of fatigue and increase stamina to return to prior level of function; Patient will increase static/dynamic sitting balance to fair+ to improve the ability to sit at edge of bed or on a chair for ADLS;  Patient will increase dynamic standing balance to fair- to improve postural stability and decrease fall risk during standing ADLS and transfers       LTG Time Frame   (8-14 days)   Long Term Goal Patient will increase standing tolerance to 10 minutes during ADL task to decrease assistance level and decrease fall risk; Patient will increase functional mobility to and from bathroom with rolling walker with supervision to increase performance with ADLS and to use a toilet; Patient will tolerate 10 minutes of UE ROM/strengthening to increase general activity tolerance and performance in ADLS/IADLS; Patient will improve functional activity tolerance to 10 minutes of sustained functional tasks to increase participation in basic self-care and decrease assistance level;  Patient will be able to to verbalize understanding and perform energy conservation/proper body mechanics during ADLS and functional mobility at least 90% of the time with nocueing to decrease signs of fatigue and increase stamina to return to prior level of function; Patient will increase static/dynamic sitting balance to good to improve the ability to sit at edge of bed or on a chair for ADLS;  Patient will increase static/dynamic standing balance to fair to improve postural stability and decrease fall risk during standing ADLS and transfers  Functional Transfer Goals   Pt Will Perform All Functional Transfers   (STG min assist, LTG supervision)   ADL Goals   Pt Will Perform Eating   (LTG independent)   Pt Will Perform Grooming   (LTG independent)   Pt Will Perform Bathing   (STG supervision, LTG independent)   Pt Will Perform UE Dressing   (LTG independent)   Pt Will Perform LE Dressing   (STG supervision, LTG independent)   Pt Will Perform Toileting   (STG supervision, LTG independent)   Plan   Treatment Interventions ADL retraining;Functional transfer training;UE strengthening/ROM; Endurance training;Patient/family training;Equipment evaluation/education; Compensatory technique education;Continued evaluation; Energy conservation; Activityengagement   Goal Expiration Date 09/29/20   OT Frequency 3-5x/wk   Recommendation   OT Discharge Recommendation Post-Acute Rehabilitation Services   Barthel Index   Feeding 10   Bathing 0   Grooming Score 0   Dressing Score 5   Bladder Score 10   Bowels Score 10   Toilet Use Score 5   Transfers (Bed/Chair) Score 5   Mobility (Level Surface) Score 0   Stairs Score 0   Barthel Index Score 39   Licensure   [place] License Number  Devika Parsons, OTR/L [id number]

## 2020-09-15 NOTE — NURSING NOTE
Tilt table test completed  Patient stated he felt lightheaded throughout test  BP steady trend down  Once laid supine at end of test, BP increased and patient stated he felt better  Xiao Mckee and primary RN notified of tilt table results  Patient transferred to inpatient room in stable condition via wheelchair

## 2020-09-15 NOTE — PLAN OF CARE
Problem: Potential for Falls  Goal: Patient will remain free of falls  Description: INTERVENTIONS:  - Assess patient frequently for physical needs  -  Identify cognitive and physical deficits and behaviors that affect risk of falls  -  [place] fall precautions as indicated by assessment   - Educate patient/family on patient safety including physical limitations  - Instruct patient to call for assistance with activity based on assessment  - Modify environment to reduce risk of injury  - Consider OT/PT consult to assist with strengthening/mobility  Outcome: Progressing - Patient has call bell and bed side table in reach and the bed alarm is on  Problem: Nutrition/Hydration-ADULT  Goal: Nutrient/Hydration intake appropriate for improving, restoring or maintaining nutritional needs  Description: Monitor and assess patient's nutrition/hydration status for malnutrition  Collaborate with interdisciplinary team and initiate plan and interventions as ordered  Monitor patient's weight and dietary intake as ordered or per policy  Utilize nutrition screening tool and intervene as necessary  Determine patient's food preferences and provide high-protein, high-caloric foods as appropriate       INTERVENTIONS:  - Monitor oral intake, urinary output, labs, and treatment plans  - Assess nutrition and hydration status and recommend course of action  - Evaluate amount of meals eaten  - Assist patient with eating if necessary   - Allow adequate time for meals  - Recommend/ encourage appropriate diets, oral nutritional supplements, and vitamin/mineral supplements  - Assess need for intravenous fluids  - Provide specific nutrition/hydration education as appropriate  - Include patient/family/caregiver in decisions related to nutrition  Outcome: Progressing     Problem: PAIN - ADULT  Goal: Verbalizes/displays adequate comfort level or baseline comfort level  Description: Interventions:  - Encourage patient to monitor pain and request assistance  - Assess pain using appropriate pain scale  - Administer analgesics based on type and severity of pain and evaluate response  - Implement non-pharmacological measures as appropriate and evaluate response  - Consider cultural and social influences on pain and pain management  - Notify physician/advanced practitioner if interventions unsuccessful or patient reports new pain  Outcome: Progressing - Patient has no complaints of pain at this time  Problem: INFECTION - ADULT  Goal: Absence or prevention of progression during hospitalization  Description: INTERVENTIONS:  - Assess and monitor for signs and symptoms of infection  - Monitor lab/diagnostic results  - Monitor all insertion sites, i e  indwelling lines, tubes, and drains  - Administer medications as ordered  - Instruct and encourage patient and family to use good hand hygiene technique  - Identify and instruct in appropriate isolation precautions for identified infection/condition  Outcome: Progressing  Goal: Absence of fever/infection during neutropenic period  Description: INTERVENTIONS:  - Monitor WBC    Outcome: Progressing - WBC in normal range as of the morning of 9/14/20       Goal: Maintain or return to baseline ADL function  Description: INTERVENTIONS:  -  Assess patient's ability to carry out ADLs; assess patient's baseline for ADL function and identify physical deficits which impact ability to perform ADLs (bathing, care of mouth/teeth, toileting, grooming, dressing, etc )  - Assess/evaluate cause of self-care deficits   - Assess range of motion  - Assess patient's mobility; develop plan if impaired  - Assess patient's need for assistive devices and provide as appropriate  - Encourage maximum independence but intervene and supervise when necessary  - Involve family in performance of ADLs  - Assess for home care needs following discharge   - Consider OT consult to assist with ADL evaluation and planning for discharge  - Provide patient education as appropriate  Outcome: Progressing  Goal: Maintain or return mobility status to optimal level  Description: INTERVENTIONS:  - Assess patient's baseline mobility status (ambulation, transfers, stairs, etc )    - Identify cognitive and physical deficits and behaviors that affect mobility  - Identify mobility aids required to assist with transfers and/or ambulation (gait belt, sit-to-stand, lift, walker, cane, etc )  - [place] fall precautions as indicated by assessment  - Instruct patient to call for assistance with activity based on assessment  - Consider rehabilitation consult to assist with strengthening/weightbearing, etc   Outcome: Progressing     Problem: DISCHARGE PLANNING  Goal: Discharge to home or other facility with appropriate resources  Description: INTERVENTIONS:  - Identify barriers to discharge w/patient and caregiver  - Arrange for needed discharge resources and transportation as appropriate  - Identify discharge learning needs (meds, wound care, etc )  - Refer to Case Management Department for coordinating discharge planning if the patient needs post-hospital services based on physician/advanced practitioner order or complex needs related to functional status, cognitive ability, or social support system  Outcome: Progressing     Problem: Knowledge Deficit  Goal: Patient/family/caregiver demonstrates understanding of disease process, treatment plan, medications, and discharge instructions  Description: Complete learning assessment and assess knowledge base    Interventions:  - Provide teaching at level of understanding  - Provide teaching via preferred learning methods  Outcome: Progressing     Problem: RESPIRATORY - ADULT  Goal: Achieves optimal ventilation and oxygenation  Description: INTERVENTIONS:  - Assess for changes in respiratory status  - Assess for changes in mentation and behavior  - Position to facilitate oxygenation and minimize respiratory effort  - Oxygen administered by appropriate delivery if ordered  - Initiate smoking cessation education as indicated  - Encourage broncho-pulmonary hygiene including cough, deep breathe, Incentive Spirometry  - Assess the need for suctioning and aspirate as needed  - Assess and instruct to report SOB or any respiratory difficulty  - Respiratory Therapy support as indicated  Outcome: Progressing     Problem: Neurological Deficit  Goal: Neurological status is stable or improving  Description: Interventions:  - Monitor and assess patient's level of consciousness, motor function, sensory function, and level of assistance needed for ADLs  - Monitor and report changes from baseline  Collaborate with interdisciplinary team to initiate plan and implement interventions as ordered  - Provide and maintain a safe environment  - Consider seizure precautions  - Consider fall precautions  - Consider aspiration precautions  - Consider bleeding precautions  Outcome: Progressing     Problem: Activity Intolerance/Impaired Mobility  Goal: Mobility/activity is maintained at optimum level for patient  Description: Interventions:  - Assess and monitor patient  barriers to mobility and need for assistive/adaptive devices  - Assess patient's emotional response to limitations  - Collaborate with interdisciplinary team and initiate plans and interventions as ordered  - Encourage independent activity per ability   - Maintain proper body alignment  - Perform active/passive rom as tolerated/ordered  - Plan activities to conserve energy   - Turn patient as appropriate  Outcome: Progressing     Problem: Communication Impairment  Goal: Ability to express needs and understand communication  Description: Assess patient's communication skills and ability to understand information  Patient will demonstrate use of effective communication techniques, alternative methods of communication and understanding even if not able to speak       - Encourage communication and provide alternate methods of communication as needed  - Collaborate with case management/ for discharge needs  - Include patient/family/caregiver in decisions related to communication  Outcome: Progressing - Patient is able to express needs, understand communication and new information  Problem: Potential for Aspiration  Goal: Non-ventilated patient's risk of aspiration is minimized  Description: Assess and monitor vital signs, respiratory status, and labs (WBC)  Monitor for signs of aspiration (tachypnea, cough, rales, wheezing, cyanosis, fever)  - Assess and monitor patient's ability to swallow  - Place patient up in chair to eat if possible  - HOB up at 90 degrees to eat if unable to get patient up into chair   - Supervise patient during oral intake  - Instruct patient/ family to take small bites  - Instruct patient/ family to take small single sips when taking liquids    - Follow patient-specific strategies generated by speech pathologist   Outcome: Progressing

## 2020-09-16 VITALS
HEART RATE: 72 BPM | SYSTOLIC BLOOD PRESSURE: 152 MMHG | BODY MASS INDEX: 21.97 KG/M2 | RESPIRATION RATE: 21 BRPM | WEIGHT: 186.07 LBS | DIASTOLIC BLOOD PRESSURE: 83 MMHG | OXYGEN SATURATION: 98 % | TEMPERATURE: 98.9 F | HEIGHT: 77 IN

## 2020-09-16 DIAGNOSIS — I10 SUPINE HYPERTENSION: Primary | ICD-10-CM

## 2020-09-16 LAB
ANION GAP SERPL CALCULATED.3IONS-SCNC: 9 MMOL/L (ref 4–13)
BASOPHILS # BLD AUTO: 0.05 THOUSANDS/ΜL (ref 0–0.1)
BASOPHILS NFR BLD AUTO: 1 % (ref 0–1)
BUN SERPL-MCNC: 15 MG/DL (ref 5–25)
CALCIUM SERPL-MCNC: 8.6 MG/DL (ref 8.3–10.1)
CHLORIDE SERPL-SCNC: 105 MMOL/L (ref 100–108)
CO2 SERPL-SCNC: 26 MMOL/L (ref 21–32)
CREAT SERPL-MCNC: 1.36 MG/DL (ref 0.6–1.3)
ELASTASE PANC STL-MCNT: >500 UG ELAST./G
EOSINOPHIL # BLD AUTO: 0.4 THOUSAND/ΜL (ref 0–0.61)
EOSINOPHIL NFR BLD AUTO: 5 % (ref 0–6)
ERYTHROCYTE [DISTWIDTH] IN BLOOD BY AUTOMATED COUNT: 13.1 % (ref 11.6–15.1)
GFR SERPL CREATININE-BSD FRML MDRD: 50 ML/MIN/1.73SQ M
GLUCOSE SERPL-MCNC: 105 MG/DL (ref 65–140)
HCT VFR BLD AUTO: 39.8 % (ref 36.5–49.3)
HGB BLD-MCNC: 13.4 G/DL (ref 12–17)
IMM GRANULOCYTES # BLD AUTO: 0.08 THOUSAND/UL (ref 0–0.2)
IMM GRANULOCYTES NFR BLD AUTO: 1 % (ref 0–2)
LYMPHOCYTES # BLD AUTO: 2.86 THOUSANDS/ΜL (ref 0.6–4.47)
LYMPHOCYTES NFR BLD AUTO: 33 % (ref 14–44)
MAGNESIUM SERPL-MCNC: 1.9 MG/DL (ref 1.6–2.6)
MCH RBC QN AUTO: 32.7 PG (ref 26.8–34.3)
MCHC RBC AUTO-ENTMCNC: 33.7 G/DL (ref 31.4–37.4)
MCV RBC AUTO: 97 FL (ref 82–98)
MONOCYTES # BLD AUTO: 0.56 THOUSAND/ΜL (ref 0.17–1.22)
MONOCYTES NFR BLD AUTO: 7 % (ref 4–12)
NEUTROPHILS # BLD AUTO: 4.7 THOUSANDS/ΜL (ref 1.85–7.62)
NEUTS SEG NFR BLD AUTO: 53 % (ref 43–75)
NRBC BLD AUTO-RTO: 0 /100 WBCS
PHOSPHATE SERPL-MCNC: 3.4 MG/DL (ref 2.3–4.1)
PLATELET # BLD AUTO: 133 THOUSANDS/UL (ref 149–390)
PMV BLD AUTO: 9 FL (ref 8.9–12.7)
POTASSIUM SERPL-SCNC: 3.8 MMOL/L (ref 3.5–5.3)
RBC # BLD AUTO: 4.1 MILLION/UL (ref 3.88–5.62)
SODIUM SERPL-SCNC: 140 MMOL/L (ref 136–145)
WBC # BLD AUTO: 8.65 THOUSAND/UL (ref 4.31–10.16)

## 2020-09-16 PROCEDURE — 99238 HOSP IP/OBS DSCHRG MGMT 30/<: CPT | Performed by: FAMILY MEDICINE

## 2020-09-16 PROCEDURE — 94640 AIRWAY INHALATION TREATMENT: CPT

## 2020-09-16 PROCEDURE — 83735 ASSAY OF MAGNESIUM: CPT | Performed by: STUDENT IN AN ORGANIZED HEALTH CARE EDUCATION/TRAINING PROGRAM

## 2020-09-16 PROCEDURE — 80048 BASIC METABOLIC PNL TOTAL CA: CPT | Performed by: STUDENT IN AN ORGANIZED HEALTH CARE EDUCATION/TRAINING PROGRAM

## 2020-09-16 PROCEDURE — 84100 ASSAY OF PHOSPHORUS: CPT | Performed by: STUDENT IN AN ORGANIZED HEALTH CARE EDUCATION/TRAINING PROGRAM

## 2020-09-16 PROCEDURE — 99232 SBSQ HOSP IP/OBS MODERATE 35: CPT | Performed by: INTERNAL MEDICINE

## 2020-09-16 PROCEDURE — 85025 COMPLETE CBC W/AUTO DIFF WBC: CPT | Performed by: STUDENT IN AN ORGANIZED HEALTH CARE EDUCATION/TRAINING PROGRAM

## 2020-09-16 PROCEDURE — NC001 PR NO CHARGE: Performed by: FAMILY MEDICINE

## 2020-09-16 PROCEDURE — 94760 N-INVAS EAR/PLS OXIMETRY 1: CPT

## 2020-09-16 RX ORDER — MIDODRINE HYDROCHLORIDE 2.5 MG/1
2.5 TABLET ORAL
Qty: 30 TABLET | Refills: 3 | Status: SHIPPED | OUTPATIENT
Start: 2020-09-16 | End: 2020-10-23

## 2020-09-16 RX ORDER — AMLODIPINE BESYLATE 2.5 MG/1
2.5 TABLET ORAL DAILY
Status: DISCONTINUED | OUTPATIENT
Start: 2020-09-16 | End: 2020-09-16 | Stop reason: HOSPADM

## 2020-09-16 RX ORDER — MIDODRINE HYDROCHLORIDE 2.5 MG/1
2.5 TABLET ORAL 3 TIMES DAILY
Qty: 3 TABLET | Refills: 0 | Status: SHIPPED | OUTPATIENT
Start: 2020-09-16 | End: 2020-09-23

## 2020-09-16 RX ORDER — LORAZEPAM 1 MG/1
1 TABLET ORAL 2 TIMES DAILY
Status: DISCONTINUED | OUTPATIENT
Start: 2020-09-16 | End: 2020-09-16 | Stop reason: HOSPADM

## 2020-09-16 RX ORDER — AMLODIPINE BESYLATE 2.5 MG/1
2.5 TABLET ORAL DAILY
Qty: 30 TABLET | Refills: 3 | Status: SHIPPED | OUTPATIENT
Start: 2020-09-17 | End: 2021-01-04

## 2020-09-16 RX ADMIN — CHOLESTYRAMINE 4 G: 4 POWDER, FOR SUSPENSION ORAL at 08:30

## 2020-09-16 RX ADMIN — PANTOPRAZOLE SODIUM 40 MG: 40 TABLET, DELAYED RELEASE ORAL at 06:08

## 2020-09-16 RX ADMIN — MIDODRINE HYDROCHLORIDE 2.5 MG: 5 TABLET ORAL at 11:49

## 2020-09-16 RX ADMIN — PANTOPRAZOLE SODIUM 40 MG: 40 TABLET, DELAYED RELEASE ORAL at 16:29

## 2020-09-16 RX ADMIN — BUSPIRONE HYDROCHLORIDE 10 MG: 10 TABLET ORAL at 08:30

## 2020-09-16 RX ADMIN — PREDNISONE 10 MG: 10 TABLET ORAL at 08:30

## 2020-09-16 RX ADMIN — FLUTICASONE PROPIONATE 2 SPRAY: 50 SPRAY, METERED NASAL at 08:30

## 2020-09-16 RX ADMIN — ENOXAPARIN SODIUM 40 MG: 40 INJECTION SUBCUTANEOUS at 08:30

## 2020-09-16 RX ADMIN — IPRATROPIUM BROMIDE AND ALBUTEROL SULFATE 3 ML: 2.5; .5 SOLUTION RESPIRATORY (INHALATION) at 16:35

## 2020-09-16 RX ADMIN — GUAIFENESIN 200 MG: 100 SOLUTION ORAL at 08:30

## 2020-09-16 RX ADMIN — BUSPIRONE HYDROCHLORIDE 10 MG: 10 TABLET ORAL at 16:29

## 2020-09-16 RX ADMIN — IPRATROPIUM BROMIDE AND ALBUTEROL SULFATE 3 ML: 2.5; .5 SOLUTION RESPIRATORY (INHALATION) at 08:01

## 2020-09-16 RX ADMIN — FORMOTEROL FUMARATE DIHYDRATE 20 MCG: 20 SOLUTION RESPIRATORY (INHALATION) at 08:01

## 2020-09-16 RX ADMIN — GABAPENTIN 300 MG: 300 CAPSULE ORAL at 16:29

## 2020-09-16 RX ADMIN — MIDODRINE HYDROCHLORIDE 2.5 MG: 5 TABLET ORAL at 06:08

## 2020-09-16 RX ADMIN — AMLODIPINE BESYLATE 2.5 MG: 2.5 TABLET ORAL at 14:49

## 2020-09-16 RX ADMIN — BUDESONIDE 0.5 MG: 0.5 INHALANT RESPIRATORY (INHALATION) at 08:01

## 2020-09-16 RX ADMIN — GABAPENTIN 300 MG: 300 CAPSULE ORAL at 08:30

## 2020-09-16 RX ADMIN — MIDODRINE HYDROCHLORIDE 2.5 MG: 5 TABLET ORAL at 16:29

## 2020-09-16 RX ADMIN — FAMOTIDINE 20 MG: 20 TABLET ORAL at 08:30

## 2020-09-16 RX ADMIN — LORAZEPAM 1 MG: 1 TABLET ORAL at 11:49

## 2020-09-16 NOTE — PROGRESS NOTES
[facility] Progress Note - Kathia Sweet 67 y o  male MRN: [id number]    Unit/Bed#: [address] [place] [address] Encounter: [id number]      Assessment/Plan:    * Syncope  Assessment & Plan  Pt had 1x syncopal episode day of admission  Patient feels lightheaded in bed  EKG- normal sinus rhythm   BP - 181/9   CT head and neck w/o contrast - No evidence of acute intracranial hemorrhage  Connor Segura Approximately 68% atherosclerotic plaque stenosis proximal left cervical ICA     - Telemetry   - orthostatic vitals-negative  -Consult cardio- tilt table test, adjust hypertension medications-norvasc 5 mg and discontinue triamterene HCTZ  -Tilt table test- positive tilt table test  -Change norvasc 5 mg to 2 5 mg  Discontinue flomax and triamterene HCTZ  -Start midodrine 2 5 mg TID  -      COPD (chronic obstructive pulmonary disease) ([facility])  Assessment & Plan  History of COPD  Currently stable    Continue home medications:  DuoNebs, Flonase 50 mcg, performist, prednisone 10 mg daily, pulmicort    Insomnia  Assessment & Plan  Hx of insomnia currently taking ativan 1mg and trazadone        Taking ativan 1mg   Hold trazadone 50 mg QD  Hold doxylamine 25mg   Discontinue trazadone 50mg because of syncopal effects    BPH (benign prostatic hyperplasia)  Assessment & Plan  History of BPH  Currently stable  Hold tamsulosin 0 2mg daily  Hold finestride 5mg       Essential hypertension  Assessment & Plan  History of  Hypertension  BP today ranging 160s-180s /70s-80s    Norvasc 2 5 mg daily  Hold  triamterene-hydrochlorothiazide 37 5-25 mg daily  Monitor vital signs    Gastroesophageal reflux disease  Assessment & Plan  Hx of GERD  Currently stable     Continue pepcid 20mg   Continue 40mg PO           Subjective:     Patient was examined at bedside  Patient said he has lightheadedness when he lies down but it has improved  When he walks short distances he stated he still feels SOB  Patient ambulates with a walker   Patient denies HA, nausea, vomiting, and chest pain  Objective:     Vitals: Blood pressure 155/84, pulse 68, temperature 98 3 °F (36 8 °C), temperature source Oral, resp  rate (!) 23, height 6' 5" (1 956 m), weight 84 4 kg (186 lb 1 1 oz), SpO2 98 %  ,Body mass index is 22 06 kg/m²  Wt Readings from Last 3 Encounters:   09/13/20 84 4 kg (186 lb 1 1 oz)   09/11/20 84 5 kg (186 lb 3 2 oz)   09/04/20 85 3 kg (188 lb)       Intake/Output Summary (Last 24 hours) at 9/16/2020 1553  Last data filed at 9/16/2020 0803  Gross per 24 hour   Intake 740 ml   Output --   Net 740 ml       Physical Exam:   Physical Exam: Constitutional:       Appearance: Elderly male  HENT:      Head: Normocephalic and atraumatic  Cardiovascular:      Rate and Rhythm: Normal rate and regular rhythm  Pulmonary:      Effort: Pulmonary effort is normal       Breath sounds: Normal breath sounds  Abdominal:      General: Abdomen is flat       Palpations: Abdomen is soft     Neurological:      General: No focal deficit present       Mental Status: He is oriented to person, place, and time           Recent Results (from the past 24 hour(s))   CBC and differential    Collection Time: 09/16/20  5:16 AM   Result Value Ref Range    WBC 8 65 4 31 - 10 16 Thousand/uL    RBC 4 10 3 88 - 5 62 Million/uL    Hemoglobin 13 4 12 0 - 17 0 g/dL    Hematocrit 39 8 36 5 - 49 3 %    MCV 97 82 - 98 fL    MCH 32 7 26 8 - 34 3 pg    MCHC 33 7 31 4 - 37 4 g/dL    RDW 13 1 11 6 - 15 1 %    MPV 9 0 8 9 - 12 7 fL    Platelets 299 (L) 339 - 390 Thousands/uL    nRBC 0 /100 WBCs    Neutrophils Relative 53 43 - 75 %    Immat GRANS % 1 0 - 2 %    Lymphocytes Relative 33 14 - 44 %    Monocytes Relative 7 4 - 12 %    Eosinophils Relative 5 0 - 6 %    Basophils Relative 1 0 - 1 %    Neutrophils Absolute 4 70 1 85 - 7 62 Thousands/µL    Immature Grans Absolute 0 08 0 00 - 0 20 Thousand/uL    Lymphocytes Absolute 2 86 0 60 - 4 47 Thousands/µL    Monocytes Absolute 0 56 0 17 - 1 22 Thousand/µL Eosinophils Absolute 0 40 0 00 - 0 61 Thousand/µL    Basophils Absolute 0 05 0 00 - 0 10 Thousands/µL   Basic metabolic panel    Collection Time: 09/16/20  5:16 AM   Result Value Ref Range    Sodium 140 136 - 145 mmol/L    Potassium 3 8 3 5 - 5 3 mmol/L    Chloride 105 100 - 108 mmol/L    CO2 26 21 - 32 mmol/L    ANION GAP 9 4 - 13 mmol/L    BUN 15 5 - 25 mg/dL    Creatinine 1 36 (H) 0 60 - 1 30 mg/dL    Glucose 105 65 - 140 mg/dL    Calcium 8 6 8 3 - 10 1 mg/dL    eGFR 50 ml/min/1 73sq m   Magnesium    Collection Time: 09/16/20  5:16 AM   Result Value Ref Range    Magnesium 1 9 1 6 - 2 6 mg/dL   Phosphorus    Collection Time: 09/16/20  5:16 AM   Result Value Ref Range    Phosphorus 3 4 2 3 - 4 1 mg/dL       Current Facility-Administered Medications   Medication Dose Route Frequency Provider Last Rate Last Dose    acetaminophen (TYLENOL) tablet 650 mg  650 mg Oral Q6H PRN [place] Beans, DO        amLODIPine (NORVASC) tablet 2 5 mg  2 5 mg Oral Daily Hyacinth Tadeo MD   2 5 mg at 09/16/20 1449    budesonide (PULMICORT) inhalation solution 0 5 mg  0 5 mg Nebulization BID [place] Beans, DO   0 5 mg at 09/16/20 0801    busPIRone (BUSPAR) tablet 10 mg  10 mg Oral TID Evelin Beans, DO   10 mg at 09/16/20 0830    cholestyramine sugar free (QUESTRAN LIGHT) packet 4 g  4 g Oral BID [place] Beans, DO   4 g at 09/16/20 0830    enoxaparin (LOVENOX) subcutaneous injection 40 mg  40 mg Subcutaneous Daily Evelin Beans, DO   40 mg at 09/16/20 0830    famotidine (PEPCID) tablet 20 mg  20 mg Oral Daily Evelin Beans, DO   20 mg at 09/16/20 0830    fluticasone (FLONASE) 50 mcg/act nasal spray 2 spray  2 spray Nasal Daily [place] Beans, DO   2 spray at 09/16/20 0830    formoterol (PERFOROMIST) nebulizer solution 20 mcg  20 mcg Nebulization BID Evelin Beans, DO   20 mcg at 09/16/20 0801    gabapentin (NEURONTIN) capsule 300 mg  300 mg Oral TID Evelin Yost DO   300 mg at 09/16/20 0830    guaiFENesin (ROBITUSSIN) oral solution 200 mg  200 mg Oral BID Larwence Lakes, DO   200 mg at 09/16/20 0830    ipratropium-albuterol (DUO-NEB) 0 5-2 5 mg/3 mL inhalation solution 3 mL  3 mL Nebulization 4x Daily Lar[place]ence Lakes, DO   3 mL at 09/16/20 0801    LORazepam (ATIVAN) tablet 1 mg  1 mg Oral BID Bettie Mason MD   1 mg at 09/16/20 1149    midodrine (PROAMATINE) tablet 2 5 mg  2 5 mg Oral TID AC TOPHER Krishna   2 5 mg at 09/16/20 1149    pantoprazole (PROTONIX) EC tablet 40 mg  40 mg Oral BID Larwence Lakes, DO   40 mg at 09/16/20 7635    predniSONE tablet 10 mg  10 mg Oral Daily Lar[place]ence Lakes, DO   10 mg at 09/16/20 0830       Invasive Devices     Peripheral Intravenous Line            Peripheral IV Left Antecubital -- days                Lab, Imaging and other studies: I have personally reviewed pertinent reports      VTE Pharmacologic Prophylaxis: Enoxaparin (Lovenox)  VTE Mechanical Prophylaxis: reason for no mechanical VTE prophylaxis patient refused    Fredi Severe, MD

## 2020-09-16 NOTE — PLAN OF CARE
Problem: Potential for Falls  Goal: Patient will remain free of falls  Description: INTERVENTIONS:  - Assess patient frequently for physical needs  -  Identify cognitive and physical deficits and behaviors that affect risk of falls  -  [place] fall precautions as indicated by assessment   - Educate patient/family on patient safety including physical limitations  - Instruct patient to call for assistance with activity based on assessment  - Modify environment to reduce risk of injury  - Consider OT/PT consult to assist with strengthening/mobility  Outcome: Progressing     Problem: Nutrition/Hydration-ADULT  Goal: Nutrient/Hydration intake appropriate for improving, restoring or maintaining nutritional needs  Description: Monitor and assess patient's nutrition/hydration status for malnutrition  Collaborate with interdisciplinary team and initiate plan and interventions as ordered  Monitor patient's weight and dietary intake as ordered or per policy  Utilize nutrition screening tool and intervene as necessary  Determine patient's food preferences and provide high-protein, high-caloric foods as appropriate       INTERVENTIONS:  - Monitor oral intake, urinary output, labs, and treatment plans  - Assess nutrition and hydration status and recommend course of action  - Evaluate amount of meals eaten  - Assist patient with eating if necessary   - Allow adequate time for meals  - Recommend/ encourage appropriate diets, oral nutritional supplements, and vitamin/mineral supplements  - Assess need for intravenous fluids  - Provide specific nutrition/hydration education as appropriate  - Include patient/family/caregiver in decisions related to nutrition  Outcome: Progressing     Problem: PAIN - ADULT  Goal: Verbalizes/displays adequate comfort level or baseline comfort level  Description: Interventions:  - Encourage patient to monitor pain and request assistance  - Assess pain using appropriate pain scale  - Administer analgesics based on type and severity of pain and evaluate response  - Implement non-pharmacological measures as appropriate and evaluate response  - Consider cultural and social influences on pain and pain management  - Notify physician/advanced practitioner if interventions unsuccessful or patient reports new pain  Outcome: Progressing     Problem: INFECTION - ADULT  Goal: Absence or prevention of progression during hospitalization  Description: INTERVENTIONS:  - Assess and monitor for signs and symptoms of infection  - Monitor lab/diagnostic results  - Monitor all insertion sites, i e  indwelling lines, tubes, and drains  - Administer medications as ordered  - Instruct and encourage patient and family to use good hand hygiene technique  - Identify and instruct in appropriate isolation precautions for identified infection/condition  Outcome: Progressing     Problem: SAFETY ADULT  Goal: Patient will remain free of falls  Description: INTERVENTIONS:  - Assess patient frequently for physical needs  -  Identify cognitive and physical deficits and behaviors that affect risk of falls    -  [place] fall precautions as indicated by assessment   - Educate patient/family on patient safety including physical limitations  - Instruct patient to call for assistance with activity based on assessment  - Modify environment to reduce risk of injury  - Consider OT/PT consult to assist with strengthening/mobility  Outcome: Progressing  Goal: Maintain or return to baseline ADL function  Description: INTERVENTIONS:  -  Assess patient's ability to carry out ADLs; assess patient's baseline for ADL function and identify physical deficits which impact ability to perform ADLs (bathing, care of mouth/teeth, toileting, grooming, dressing, etc )  - Assess/evaluate cause of self-care deficits   - Assess range of motion  - Assess patient's mobility; develop plan if impaired  - Assess patient's need for assistive devices and provide as appropriate  - Encourage maximum independence but intervene and supervise when necessary  - Involve family in performance of ADLs  - Assess for home care needs following discharge   - Consider OT consult to assist with ADL evaluation and planning for discharge  - Provide patient education as appropriate  Outcome: Progressing  Goal: Maintain or return mobility status to optimal level  Description: INTERVENTIONS:  - Assess patient's baseline mobility status (ambulation, transfers, stairs, etc )    - Identify cognitive and physical deficits and behaviors that affect mobility  - Identify mobility aids required to assist with transfers and/or ambulation (gait belt, sit-to-stand, lift, walker, cane, etc )  - [place] fall precautions as indicated by assessment  - Instruct patient to call for assistance with activity based on assessment  - Consider rehabilitation consult to assist with strengthening/weightbearing, etc   Outcome: Progressing     Problem: DISCHARGE PLANNING  Goal: Discharge to home or other facility with appropriate resources  Description: INTERVENTIONS:  - Identify barriers to discharge w/patient and caregiver  - Arrange for needed discharge resources and transportation as appropriate  - Identify discharge learning needs (meds, wound care, etc )  - Refer to Case Management Department for coordinating discharge planning if the patient needs post-hospital services based on physician/advanced practitioner order or complex needs related to functional status, cognitive ability, or social support system  Outcome: Progressing     Problem: Knowledge Deficit  Goal: Patient/family/caregiver demonstrates understanding of disease process, treatment plan, medications, and discharge instructions  Description: Complete learning assessment and assess knowledge base    Interventions:  - Provide teaching at level of understanding  - Provide teaching via preferred learning methods  Outcome: Progressing     Problem: RESPIRATORY - ADULT  Goal: Achieves optimal ventilation and oxygenation  Description: INTERVENTIONS:  - Assess for changes in respiratory status  - Assess for changes in mentation and behavior  - Position to facilitate oxygenation and minimize respiratory effort  - Oxygen administered by appropriate delivery if ordered  - Initiate smoking cessation education as indicated  - Encourage broncho-pulmonary hygiene including cough, deep breathe, Incentive Spirometry  - Assess the need for suctioning and aspirate as needed  - Assess and instruct to report SOB or any respiratory difficulty  - Respiratory Therapy support as indicated  Outcome: Progressing     Problem: Activity Intolerance/Impaired Mobility  Goal: Mobility/activity is maintained at optimum level for patient  Description: Interventions:  - Assess and monitor patient  barriers to mobility and need for assistive/adaptive devices  - Assess patient's emotional response to limitations  - Collaborate with interdisciplinary team and initiate plans and interventions as ordered  - Encourage independent activity per ability   - Maintain proper body alignment  - Perform active/passive rom as tolerated/ordered  - Plan activities to conserve energy   - Turn patient as appropriate  Outcome: Progressing     Problem: Potential for Aspiration  Goal: Non-ventilated patient's risk of aspiration is minimized  Description: Assess and monitor vital signs, respiratory status, and labs (WBC)  Monitor for signs of aspiration (tachypnea, cough, rales, wheezing, cyanosis, fever)  - Assess and monitor patient's ability to swallow  - Place patient up in chair to eat if possible  - HOB up at 90 degrees to eat if unable to get patient up into chair   - Supervise patient during oral intake  - Instruct patient/ family to take small bites  - Instruct patient/ family to take small single sips when taking liquids    - Follow patient-specific strategies generated by speech pathologist   Outcome: Progressing     Problem: Prexisting or High Potential for Compromised Skin Integrity  Goal: Skin integrity is maintained or improved  Description: INTERVENTIONS:  - Identify patients at risk for skin breakdown  - Assess and monitor skin integrity  - Assess and monitor nutrition and hydration status  - Monitor labs   - Assess for incontinence   - Turn and reposition patient  - Assist with mobility/ambulation  - Relieve pressure over bony prominences  - Avoid friction and shearing  - Provide appropriate hygiene as needed including keeping skin clean and dry  - Evaluate need for skin moisturizer/barrier cream  - Collaborate with interdisciplinary team   - Patient/family teaching  - Consider wound care consult   Outcome: Progressing     Problem: Neurological Deficit  Goal: Neurological status is stable or improving  Description: Interventions:  - Monitor and assess patient's level of consciousness, motor function, sensory function, and level of assistance needed for ADLs  - Monitor and report changes from baseline  Collaborate with interdisciplinary team to initiate plan and implement interventions as ordered  - Provide and maintain a safe environment  - Consider seizure precautions  - Consider fall precautions  - Consider aspiration precautions  - Consider bleeding precautions    Outcome: Completed

## 2020-09-16 NOTE — DISCHARGE INSTR - AVS FIRST PAGE
-Start new medication Midodrine 2 5 mg three times daily  -Monitor Blood pressure  -Start Norvasc 2 5 mg   -Continue wearing compression stockings at home  -Drink plenty of fluids  -Do not make quick movements  -Ensure that you are using a walker when walking short distances

## 2020-09-16 NOTE — PROGRESS NOTES
09[facility]16/20 [facility] [facility]   Patient Information   Mental Status Alert   Primary Caregiver Self   Accompanied by/Relationship Etta Calvert MD is the PCP  [id number] [place], [address] is the pharmacy of choice and Pt expressed that his insurance covers the cost of all RX's  Pt has a Hx of Aniexty and Depression is prescribed Ativan which is being managed by his PCP  Pt denied a Hx of substance/ETOH abuse  Pt informed that he does have a Hx of HHC and inpatient rehab from a year ago, however, he could not remember the providers nor facility name  Post Acute Rehab was recommended for the Pt but he declined and expressed wanting HHC instead  As a result, he chose [facility] as his [facility] provider and CM submitted the request to initiate services via [facility] [address]  The Pt is a CPR2  His IMM was also signed on this date and he signed with no objections  CM will F/U through D/C which is planned for this date  The niece Billy Laguerre will transport upon D/C  No further actions needed at this time  Support System Immediate family  Izzy Porter (Daughter))   Legal Information   Advance Directives Power of  for finance   Advance Directives Status Discussed - Patient/Family Declined  Izzynarendra Porter (Daughter) is the financial POA/declined the Healthcare advanced directive info  )   Health Care Proxy Appointed No   Activities of Daily Living Prior to Admission   Functional Status Moderate assistance  (uses R/W)   Assistive Device Walker   Living Arrangement House;Lives alone  (2 TEMI)   Ambulation Minimum assistance  (W/ the assistance of a R/W)   Income Information   Income Source Pension/[employer]   Means of Transportation   [facility]

## 2020-09-16 NOTE — PLAN OF CARE
Problem: Potential for Falls  Goal: Patient will remain free of falls  Description: INTERVENTIONS:  - Assess patient frequently for physical needs  -  Identify cognitive and physical deficits and behaviors that affect risk of falls  -  [place] fall precautions as indicated by assessment   - Educate patient/family on patient safety including physical limitations  - Instruct patient to call for assistance with activity based on assessment  - Modify environment to reduce risk of injury  - Consider OT/PT consult to assist with strengthening/mobility  Outcome: Adequate for Discharge     Problem: Nutrition/Hydration-ADULT  Goal: Nutrient/Hydration intake appropriate for improving, restoring or maintaining nutritional needs  Description: Monitor and assess patient's nutrition/hydration status for malnutrition  Collaborate with interdisciplinary team and initiate plan and interventions as ordered  Monitor patient's weight and dietary intake as ordered or per policy  Utilize nutrition screening tool and intervene as necessary  Determine patient's food preferences and provide high-protein, high-caloric foods as appropriate       INTERVENTIONS:  - Monitor oral intake, urinary output, labs, and treatment plans  - Assess nutrition and hydration status and recommend course of action  - Evaluate amount of meals eaten  - Assist patient with eating if necessary   - Allow adequate time for meals  - Recommend/ encourage appropriate diets, oral nutritional supplements, and vitamin/mineral supplements  - Assess need for intravenous fluids  - Provide specific nutrition/hydration education as appropriate  - Include patient/family/caregiver in decisions related to nutrition  Outcome: Adequate for Discharge     Problem: PAIN - ADULT  Goal: Verbalizes/displays adequate comfort level or baseline comfort level  Description: Interventions:  - Encourage patient to monitor pain and request assistance  - Assess pain using appropriate pain scale  - Administer analgesics based on type and severity of pain and evaluate response  - Implement non-pharmacological measures as appropriate and evaluate response  - Consider cultural and social influences on pain and pain management  - Notify physician/advanced practitioner if interventions unsuccessful or patient reports new pain  Outcome: Adequate for Discharge     Problem: INFECTION - ADULT  Goal: Absence or prevention of progression during hospitalization  Description: INTERVENTIONS:  - Assess and monitor for signs and symptoms of infection  - Monitor lab/diagnostic results  - Monitor all insertion sites, i e  indwelling lines, tubes, and drains  - Administer medications as ordered  - Instruct and encourage patient and family to use good hand hygiene technique  - Identify and instruct in appropriate isolation precautions for identified infection/condition  Outcome: Adequate for Discharge     Problem: SAFETY ADULT  Goal: Patient will remain free of falls  Description: INTERVENTIONS:  - Assess patient frequently for physical needs  -  Identify cognitive and physical deficits and behaviors that affect risk of falls    -  [place] fall precautions as indicated by assessment   - Educate patient/family on patient safety including physical limitations  - Instruct patient to call for assistance with activity based on assessment  - Modify environment to reduce risk of injury  - Consider OT/PT consult to assist with strengthening/mobility  Outcome: Adequate for Discharge  Goal: Maintain or return to baseline ADL function  Description: INTERVENTIONS:  -  Assess patient's ability to carry out ADLs; assess patient's baseline for ADL function and identify physical deficits which impact ability to perform ADLs (bathing, care of mouth/teeth, toileting, grooming, dressing, etc )  - Assess/evaluate cause of self-care deficits   - Assess range of motion  - Assess patient's mobility; develop plan if impaired  - Assess patient's need for assistive devices and provide as appropriate  - Encourage maximum independence but intervene and supervise when necessary  - Involve family in performance of ADLs  - Assess for home care needs following discharge   - Consider OT consult to assist with ADL evaluation and planning for discharge  - Provide patient education as appropriate  Outcome: Adequate for Discharge  Goal: Maintain or return mobility status to optimal level  Description: INTERVENTIONS:  - Assess patient's baseline mobility status (ambulation, transfers, stairs, etc )    - Identify cognitive and physical deficits and behaviors that affect mobility  - Identify mobility aids required to assist with transfers and/or ambulation (gait belt, sit-to-stand, lift, walker, cane, etc )  - [place] fall precautions as indicated by assessment  - Instruct patient to call for assistance with activity based on assessment  - Consider rehabilitation consult to assist with strengthening/weightbearing, etc   Outcome: Adequate for Discharge     Problem: DISCHARGE PLANNING  Goal: Discharge to home or other facility with appropriate resources  Description: INTERVENTIONS:  - Identify barriers to discharge w/patient and caregiver  - Arrange for needed discharge resources and transportation as appropriate  - Identify discharge learning needs (meds, wound care, etc )  - Refer to Case Management Department for coordinating discharge planning if the patient needs post-hospital services based on physician/advanced practitioner order or complex needs related to functional status, cognitive ability, or social support system  Outcome: Adequate for Discharge     Problem: Knowledge Deficit  Goal: Patient/family/caregiver demonstrates understanding of disease process, treatment plan, medications, and discharge instructions  Description: Complete learning assessment and assess knowledge base    Interventions:  - Provide teaching at level of understanding  - Provide teaching via preferred learning methods  Outcome: Adequate for Discharge     Problem: RESPIRATORY - ADULT  Goal: Achieves optimal ventilation and oxygenation  Description: INTERVENTIONS:  - Assess for changes in respiratory status  - Assess for changes in mentation and behavior  - Position to facilitate oxygenation and minimize respiratory effort  - Oxygen administered by appropriate delivery if ordered  - Initiate smoking cessation education as indicated  - Encourage broncho-pulmonary hygiene including cough, deep breathe, Incentive Spirometry  - Assess the need for suctioning and aspirate as needed  - Assess and instruct to report SOB or any respiratory difficulty  - Respiratory Therapy support as indicated  Outcome: Adequate for Discharge     Problem: Activity Intolerance/Impaired Mobility  Goal: Mobility/activity is maintained at optimum level for patient  Description: Interventions:  - Assess and monitor patient  barriers to mobility and need for assistive/adaptive devices  - Assess patient's emotional response to limitations  - Collaborate with interdisciplinary team and initiate plans and interventions as ordered  - Encourage independent activity per ability   - Maintain proper body alignment  - Perform active/passive rom as tolerated/ordered  - Plan activities to conserve energy   - Turn patient as appropriate  Outcome: Adequate for Discharge     Problem: Potential for Aspiration  Goal: Non-ventilated patient's risk of aspiration is minimized  Description: Assess and monitor vital signs, respiratory status, and labs (WBC)  Monitor for signs of aspiration (tachypnea, cough, rales, wheezing, cyanosis, fever)  - Assess and monitor patient's ability to swallow  - Place patient up in chair to eat if possible  - HOB up at 90 degrees to eat if unable to get patient up into chair   - Supervise patient during oral intake  - Instruct patient/ family to take small bites    - Instruct patient/ family to take small single sips when taking liquids    - Follow patient-specific strategies generated by speech pathologist   Outcome: Adequate for Discharge     Problem: Prexisting or High Potential for Compromised Skin Integrity  Goal: Skin integrity is maintained or improved  Description: INTERVENTIONS:  - Identify patients at risk for skin breakdown  - Assess and monitor skin integrity  - Assess and monitor nutrition and hydration status  - Monitor labs   - Assess for incontinence   - Turn and reposition patient  - Assist with mobility/ambulation  - Relieve pressure over bony prominences  - Avoid friction and shearing  - Provide appropriate hygiene as needed including keeping skin clean and dry  - Evaluate need for skin moisturizer/barrier cream  - Collaborate with interdisciplinary team   - Patient/family teaching  - Consider wound care consult   Outcome: Adequate for Discharge

## 2020-09-16 NOTE — PROGRESS NOTES
Progress Note - Cardiology   [facility]     Toño Strickland 67 y o  male MRN: [id number]  : 1945  Unit/Bed#: [address] [address] Encounter: [id number]    Assessment and Plan:   1  Syncope/orthostatic hypotension:  Patient with positive tilt-table testing for orthostatic hypotension  Testing was done off his antihypertensive medication     -  Norvasc and Dyazide discontinued    -  Continue midodrine 2 5 mg 1st dose at time of arising, 2nd dose should be taken with lunch around 11:30-12:00 p m  And evening  dose should be taken no later than 5:00 p m  Luisa Meagan Patient should avoid taking this medication within 4 hours of going to bed  -  patient should drink at least 2 L of fluid, preferably we non caffeinated daily    -  continue to wear thigh-high support stockings during the waking hour and may remove at bedtime    -  change positions slowly    -  elevate head of bed at least 30° when sleeping to help avoid supine hypertension      2  BPH:  Patient notes he does not receive any benefit with taking Flomax  Would discontinue this medication due to his anticholinergic effect and his ongoing dizziness      3  COPD with alpha 1 antitrypsin deficiency     4  History of hypertension:  Will monitor patient off medications  Goal resting sitting blood pressure would be systolic of [phone]     5  Lightheadedness:  Patient continues with lightheadedness despite improvement of blood pressure  Consider follow-up with ENT for further evaluation     6  Anxiety depression    Subjective / Objective:   Patient seen and examined  Blood pressures improved and no further orthostasis noted  Patient still complaining of lightheadedness  Question possible component of inner ear issue  Vitals: Blood pressure 167/84, pulse 74, temperature 98 3 °F (36 8 °C), temperature source Oral, resp  rate (!) 23, height 6' 5" (1 956 m), weight 84 4 kg (186 lb 1 1 oz), SpO2 97 %    Vitals:    20 1559 20 Weight: 84 4 kg (186 lb) 84 4 kg (186 lb 1 1 oz)     Body mass index is 22 06 kg/m²  BP Readings from Last 3 Encounters:   09/16/20 167/84   09/11/20 150/78   08/26/20 (!) 172/99     Orthostatic Blood Pressures      Most Recent Value   Blood Pressure  167/84 filed at 09/16/2020 1139   Patient Position - Orthostatic VS  Sitting filed at 09/16/2020 1139        I/O       09/14 0701 - 09/15 0700 09/15 0701 - 09/16 0700 09/16 0701 - 09/17 0700    P  O  460 800 240    I V  (mL/kg)       Total Intake(mL/kg) 460 (5 5) 800 (9 5) 240 (2 8)    Urine (mL/kg/hr)       Total Output       Net +460 +800 +240           Unmeasured Urine Occurrence 4 x      Unmeasured Stool Occurrence   1 x        Invasive Devices     Peripheral Intravenous Line            Peripheral IV Left Antecubital -- days                  Intake/Output Summary (Last 24 hours) at 9/16/2020 1305  Last data filed at 9/16/2020 0803  Gross per 24 hour   Intake 740 ml   Output --   Net 740 ml         Physical Exam:   Physical Exam  Vitals signs and nursing note reviewed  Constitutional:       Appearance: Normal appearance  He is well-developed and normal weight  HENT:      Head: Normocephalic  Right Ear: External ear normal       Left Ear: External ear normal       Nose: Nose normal    Eyes:      General: No scleral icterus  Right eye: No discharge  Left eye: No discharge  Conjunctiva/sclera: Conjunctivae normal       Pupils: Pupils are equal, round, and reactive to light  Neck:      Musculoskeletal: Normal range of motion and neck supple  Thyroid: No thyromegaly  Cardiovascular:      Rate and Rhythm: Normal rate and regular rhythm  Pulses: Normal pulses  Heart sounds: Normal heart sounds  No murmur  Pulmonary:      Effort: Pulmonary effort is normal  No respiratory distress  Breath sounds: Examination of the right-middle field reveals decreased breath sounds   Examination of the right-lower field reveals decreased breath sounds  Examination of the left-lower field reveals decreased breath sounds  Decreased breath sounds present  No wheezing, rhonchi or rales  Abdominal:      General: Bowel sounds are normal  There is no distension  Palpations: Abdomen is soft  Musculoskeletal:      Right lower leg: No edema  Left lower leg: No edema  Skin:     General: Skin is warm and dry  Capillary Refill: Capillary refill takes less than 2 seconds  Neurological:      General: No focal deficit present  Mental Status: He is alert and oriented to person, place, and time  Mental status is at baseline  Psychiatric:         Attention and Perception: Attention normal          Mood and Affect: Mood normal          Speech: Speech normal          Behavior: Behavior normal          Thought Content:  Thought content normal          Cognition and Memory: Cognition normal          Judgment: Judgment normal                    Medications/ Allergies:     Current Facility-Administered Medications   Medication Dose Route Frequency Provider Last Rate    acetaminophen  650 mg Oral Q6H PRN Brianna Sages, DO      budesonide  0 5 mg Nebulization BID Shana Naun, DO      busPIRone  10 mg Oral TID Brianna Sages, DO      cholestyramine sugar free  4 g Oral BID Brianna Henson, DO      enoxaparin  40 mg Subcutaneous Daily Brianna Pareshs, [facility]      famotidine  20 mg Oral Daily Brianna Yoders, [facility]      fluticasone  2 spray Nasal Daily Brianna Yoders, [facility]      formoterol  20 mcg Nebulization BID [facility]all[facility]      gabapentin  300 mg Oral TID Brianna Sages, DO      guaiFENesin  200 mg Oral BID Shana Magallon, DO      ipratropium-albuterol  3 mL Nebulization 4x Daily Brianna Yoders, [place]      LORazepam  1 mg Oral BID Joanna Wesley MD      midodrine  2 5 mg Oral TID TOPHER Rivas      pantoprazole  40 mg Oral BID Brianna Sages, DO      predniSONE  10 mg Oral Daily Briannakeyona Yoders, [place] acetaminophen, 650 mg, Q6H PRN      Allergies   Allergen Reactions    Chantix [Varenicline]      Caused prostate infection       VTE Pharmacologic Prophylaxis:   Sequential compression device (Venodyne)     Labs:   Troponins:  Results from last 7 days   Lab Units 09/13/20  1603   TROPONIN I ng/mL <0 02     CBC with diff:  Results from last 7 days   Lab Units 09/16/20  0516 09/15/20  0515 09/14/20  0512 09/13/20  1603   WBC Thousand/uL 8 65 8 75 8 71 8 14   HEMOGLOBIN g/dL 13 4 13 0 13 9 14 2   HEMATOCRIT % 39 8 39 5 42 6 41 8   MCV fL 97 97 100* 97   PLATELETS Thousands/uL 133* 118* 114* 146*   MCH pg 32 7 31 8 32 5 32 9   MCHC g/dL 33 7 32 9 32 6 34 0   RDW % 13 1 13 0 12 8 13 0   MPV fL 9 0 9 4 10 2 9 4   NRBC AUTO /100 WBCs 0  --  0 0     CMP:  Results from last 7 days   Lab Units 09/16/20  0516 09/15/20  0515 09/14/20  0512 09/13/20  1603   SODIUM mmol/L 140 140 139 138   POTASSIUM mmol/L 3 8 3 8 3 9 4 1   CHLORIDE mmol/L 105 105 105 104   CO2 mmol/L 26 26 21 26   ANION GAP mmol/L 9 9 13 8   BUN mg/dL 15 14 17 23   CREATININE mg/dL 1 36* 1 28 1 21 1 27   CALCIUM mg/dL 8 6 8 6 8 2* 8 6   AST U/L  --   --   --  56*   ALT U/L  --   --   --  121*   ALK PHOS U/L  --   --   --  73   TOTAL PROTEIN g/dL  --   --   --  6 7   ALBUMIN g/dL  --   --   --  3 6   TOTAL BILIRUBIN mg/dL  --   --   --  1 10*   EGFR ml/min/1 73sq m 50 54 58 55     Magnesium:  Results from last 7 days   Lab Units 09/16/20  0516 09/15/20  0515 09/14/20  0512   MAGNESIUM mg/dL 1 9 2 0 2 1     Coags:  Results from last 7 days   Lab Units 09/13/20  1603   PTT seconds 25   INR  1 03       Imaging & Testing   I have personally reviewed pertinent reports  Cta Head And Neck With And Without Contrast    Result Date: 9/13/2020  Narrative: CTA NECK AND BRAIN WITH AND WITHOUT CONTRAST INDICATION: Syncope, simple, normal neuro exam Dizziness, persistent/recurrent, cardiac or vascular cause suspected COMPARISON:   None   TECHNIQUE:  Routine CT imaging of the Brain without contrast   Post contrast imaging was performed after administration of iodinated contrast through the neck and brain  Post contrast axial 0 625 mm images timed to opacify the arterial system  3D rendering was performed on an independent workstation  MIP reconstructions performed  Coronal reconstructions were performed of the noncontrast portion of the brain  Radiation dose length product (DLP) for this visit:  1356 29 mGy-cm   This examination, like all CT scans performed in the [facility], was performed utilizing techniques to minimize radiation dose exposure, including the use of iterative reconstruction and automated exposure control  IV Contrast:  85 mL of iohexol (OMNIPAQUE)  IMAGE QUALITY:   Diagnostic FINDINGS: NONCONTRAST BRAIN PARENCHYMA:  No intracranial mass, mass effect or midline shift  No CT signs of acute infarction  No acute parenchymal hemorrhage  VENTRICLES AND EXTRA-AXIAL SPACES:  Normal for the patient's age  VISUALIZED ORBITS AND PARANASAL SINUSES:  Unremarkable  CERVICAL VASCULATURE AORTIC ARCH AND GREAT VESSELS:  Normal aortic arch and great vessel origins  Normal visualized subclavian vessels  RIGHT VERTEBRAL ARTERY CERVICAL SEGMENT:  Normal origin  The vessel is normal in caliber throughout the neck  LEFT VERTEBRAL ARTERY CERVICAL SEGMENT:  Normal origin  The vessel is normal in caliber throughout the neck  RIGHT EXTRACRANIAL CAROTID SEGMENT:  Moderate atherosclerotic disease of the bifurcation  LEFT EXTRACRANIAL CAROTID SEGMENT:  Moderate to severe axial atherosclerotic disease of the bifurcation  Approximately 68% atherosclerotic plaque stenosis proximal left cervical ICA  NASCET criteria was used to determine the degree of internal carotid artery diameter stenosis  INTRACRANIAL VASCULATURE INTERNAL CAROTID ARTERIES:  Normal enhancement of the intracranial portions of the internal carotid arteries  Normal ophthalmic artery origins    Normal ICA terminus  ANTERIOR CIRCULATION:  Symmetric A1 segments and anterior cerebral arteries with normal enhancement  Normal anterior communicating artery  MIDDLE CEREBRAL ARTERY CIRCULATION:  M1 segment and middle cerebral artery branches demonstrate normal enhancement bilaterally  DISTAL VERTEBRAL ARTERIES:  Normal distal vertebral arteries  Posterior inferior cerebellar artery origins are normal  Normal vertebral basilar junction  BASILAR ARTERY:  Basilar artery is normal in caliber  Normal superior cerebellar arteries  POSTERIOR CEREBRAL ARTERIES: Both posterior cerebral arteries arises from the basilar tip  Both arteries demonstrate normal enhancement  Normal posterior communicating arteries  DURAL VENOUS SINUSES:  Normal  NON VASCULAR ANATOMY BONY STRUCTURES:  No acute osseous abnormality  SOFT TISSUES OF THE NECK:  Normal  THORACIC INLET:  Right upper lobe 3 mm lung nodule, follow-up unenhanced chest CT in 12 months  Emphysematous lung changes  Impression: 1  No evidence of acute intracranial hemorrhage  2  Approximately 68% atherosclerotic plaque stenosis proximal left cervical ICA  3  Right upper lobe 3 mm lung nodule, follow-up unenhanced chest CT in 12 months   Workstation performed: [id number]       Cardiac testing:   Results for orders placed during the hospital encounter of 19   Echo complete with contrast if indicated    [facility]  [address] [address]  [phone]    Transthoracic Echocardiogram  2D, M-mode, Doppler, and Color Doppler    Study date:  2019    Patient: Sravan Treadwell  MR number: [id number]  Account number: [de-identified]  : 1945  Age: 75 years  Gender: Male  Status: Inpatient  Location: Bedside  Height: 77 in  Weight: 186 6 lb  BP: 124/ 72 mmHg    Indications: Dyspnea    Diagnoses: R06 00 - Dyspnea, unspecified    Sonographer:  JOSE Acharya  Primary Physician:  Meghann Vinson MD  Referring Physician: Megan Ibrahim PA-C  Group:  [facility]  Interpreting Physician:  DO HEAVEN Pulliam    LEFT VENTRICLE:  Systolic function was normal by visual assessment  Ejection fraction was estimated to be 60 %  There were no regional wall motion abnormalities  There was moderate concentric hypertrophy  Doppler parameters were consistent with abnormal left ventricular relaxation (grade 1 diastolic dysfunction)  RIGHT VENTRICLE:  The ventricle was mildly dilated  RIGHT ATRIUM:  The atrium was mildly dilated  MITRAL VALVE:  There was mild regurgitation  AORTIC VALVE:  There was no evidence for stenosis  There was no regurgitation  TRICUSPID VALVE:  There was mild regurgitation  Pulmonary artery systolic pressure was mildly to moderately increased  PERICARDIUM:  A small pericardial effusion was identified  HISTORY: PRIOR HISTORY: HTN, COPD, Emphysema, IBS, Skin Cancer, BPH    PROCEDURE: The procedure was performed at the bedside  This was a routine study  The transthoracic approach was used  The study included complete 2D imaging, M-mode, complete spectral Doppler, and color Doppler  The heart rate was 73 bpm,  at the start of the study  Image quality was adequate  LEFT VENTRICLE: Size was normal  Systolic function was normal by visual assessment  Ejection fraction was estimated to be 60 %  There were no regional wall motion abnormalities  There was moderate concentric hypertrophy  DOPPLER: Doppler  parameters were consistent with abnormal left ventricular relaxation (grade 1 diastolic dysfunction)  RIGHT VENTRICLE: The ventricle was mildly dilated  Systolic function was normal     LEFT ATRIUM: The atrium was mildly dilated  No thrombus was identified  RIGHT ATRIUM: The atrium was mildly dilated  MITRAL VALVE: There was annular calcification  Valve structure was normal  There was normal leaflet separation  No echocardiographic evidence for prolapse   DOPPLER: The transmitral velocity was within the normal range  There was no  evidence for stenosis  There was mild regurgitation  AORTIC VALVE: The valve was trileaflet  Leaflets exhibited normal thickness, calcification, and normal cuspal separation  DOPPLER: Transaortic velocity was within the normal range  There was no evidence for stenosis  There was no  regurgitation  TRICUSPID VALVE: The valve structure was normal  There was normal leaflet separation  DOPPLER: The transtricuspid velocity was within the normal range  There was mild regurgitation  Pulmonary artery systolic pressure was mildly to  moderately increased  Estimated peak PA pressure was 51 mmHg  PULMONIC VALVE: Leaflets exhibited normal thickness, no calcification, and normal cuspal separation  DOPPLER: The transpulmonic velocity was within the normal range  There was no regurgitation  PERICARDIUM: There was no thickening  A small pericardial effusion was identified  AORTA: The root exhibited normal size and fibrocalcific change  PULMONARY ARTERY: The size was normal  The morphology appeared normal     SYSTEM MEASUREMENT TABLES    2D mode  AoR Diam 2D: 3 5 cm  LA Diam (2D): 4 cm  LA/Ao (2D): 1 14  FS (2D Teich): 32 6 %  IVSd (2D): 1 3 cm  LVDEV: 100 cmï¾³  LVESV: 39 1 cmï¾³  LVIDd(2D): 4 66 cm  LVISd (2D): 3 14 cm  LVPWd (2D): 1 27 cm  SV (Teich): 60 9 cmï¾³    Apical four chamber  LVEF A4C: 56 %    Unspecified Scan Mode  MV Peak A Myron: 888 mm/s  MV Peak E Myron  Mean: 775 mm/s  MVA (PHT): 3 86 cmï¾²  PHT: 57 ms  Max P mm[Hg]  V Max: 3270 mm/s  Vmax: 3270 mm/s  RA Area: 17 4 cmï¾²  RA Volume: 41 5 cmï¾³  TAPSE: 2 2 cm    [employer] Accredited [facility]chocardi[facility]    Prepared and electronically signed by    Gustabo Braswell DO  Signed 2019 10:23:48           Shreman Chan        "This note has been constructed using a voice recognition system  Therefore there may be syntax, spelling, and/or grammatical errors  Please call if you have any questions  "

## 2020-09-16 NOTE — CASE MANAGEMENT
Referrals to [facility] were declined due to the agency not having the availability to take on new patients at this time  Alternative options were discussed with the Pt and he requested that [facility] be contacted to assist with his care  CM awaiting a response and will F/U with the Pt upon D/C

## 2020-09-17 LAB
ATRIAL RATE: 85 BPM
P AXIS: -19 DEGREES
PR INTERVAL: 136 MS
QRS AXIS: 36 DEGREES
QRSD INTERVAL: 98 MS
QT INTERVAL: 360 MS
QTC INTERVAL: 428 MS
T WAVE AXIS: 10 DEGREES
VENTRICULAR RATE: 85 BPM

## 2020-09-17 PROCEDURE — 93010 ELECTROCARDIOGRAM REPORT: CPT | Performed by: INTERNAL MEDICINE

## 2020-09-17 NOTE — DISCHARGE SUMMARY
[facility] Discharge Summary - Medical Kathia Sweet 67 y o  male MRN: [id number]    [facility]  Room / Bed: [place] [id number] Encounter: [id number]    BRIEF OVERVIEW  Admitting Provider: Tomy Reyes MD  Discharge Provider: Georgetta Mortimer, MD  Discharge To: Home      Outpatient Follow-Up: [facility]    Things to address at first follow up visit:     Are you still feeling lightheaded? Have you received PT/OT services to your home? How is the new medication midodrine? How are you ambulating at home? Any issues? After discontinuing trazadone do you feel less lightheaded? Note any syncopal episodes? Labs and results pending at discharge: none    Admission Date: 9/13/2020     Discharge Date: 9/16/2020  6:05 PM    Primary Discharge Diagnosis  Principal Problem:    Syncope  Active Problems:    AAT (alpha-1-antitrypsin) deficiency ([facility])    Gastroesophageal reflux disease    Essential hypertension    BPH (benign prostatic hyperplasia)    Insomnia    COPD (chronic obstructive pulmonary disease) ([facility])  Resolved Problems:    * No resolved hospital problems  *        Consulting Providers     Opthamology  Cardiology  [facility]    Procedures Performed/Pertinent Test results  9/13 CBC, CMP wnl, troponins neg  9/14 CBC, CMP wnl  9/15 CBC, CMP wnl, tilt test  9/16 CBC, CMP wnl    HPI     Kathia Sweet is a 67 y o  male history of COPD, hypertension presents the ED after a syncope episode  Patient states he was sitting on his porch got up to go inside suddenly felt faint and a pressure in his head  [place] everything going dark and passed out  Patient states that he fell on his side but does not recall hitting his head  He remembers waxing and waning consciousness  Tried to call 911 but was too weak to use the phone  States that neighbor heard him fall and came to help    Patient has been feeling lower extremity weakness lately but states that today he fell because felt light headed not because lost balance or weakness  Denies prior occurrence  Patient  still currently feeling light headed but not as he did before  Patient denies fevers, chills, headaches, chest pain, shortness of breath, joint pain  Hospital Course    * Syncope  Assessment & Plan  Pt had 1x syncopal episode day of admission  Patient feels lightheaded in bed  EKG- normal sinus rhythm   BP - 181/90   CT head and neck w/o contrast - No evidence of acute intracranial hemorrhage  Mervat Chou Approximately 68% atherosclerotic plaque stenosis proximal left cervical ICA    - Telemetry   - orthostatic vitals-negative  -Consult cardio- tilt table test, adjust hypertension medications  -norvasc 5 mg and discontinue triamterene HCTZ  -Tilt table test- positive tilt table test showed orthostatic hypotension  -Change norvasc 5 mg to 2 5 mg  Discontinue flomax and triamterene HCTZ  -Start midodrine 2 5 mg TID          COPD (chronic obstructive pulmonary disease) (HCC)  Assessment & Plan  History of COPD  Currently stable     Continue home medications:  DuoNebs, Flonase 50 mcg, performist, prednisone 10 mg daily, pulmicort     Insomnia  Assessment & Plan  Hx of insomnia currently taking ativan 1mg and trazadone         Taking ativan 1mg   Hold trazadone 50 mg QD  Hold doxylamine 25mg   Discontinue trazadone 50mg because of syncopal effects     BPH (benign prostatic hyperplasia)  Assessment & Plan  History of BPH  Currently stable      Hold tamsulosin 0 2mg daily  Hold finestride 5mg         Essential hypertension  Assessment & Plan  History of  Hypertension  BP today ranging 160s-180s /70s-80s     Norvasc 2 5 mg daily  Hold  triamterene-hydrochlorothiazide 37 5-25 mg daily  Monitor vital signs     Gastroesophageal reflux disease  Assessment & Plan  Hx of GERD  Currently stable      Continue pepcid 20mg   Continue 40mg PO        -Patient was discharged, he is on 3L NC at home and ambulates with a walker  Advised him to drink plenty of fluids  Advised him not to make sudden movements  When evaluated by PT they recommended STR  Patient stated he had negative past experiences at STR in the past and stated he did not wish to go there  Spoke with case management to arrange VNA services which include PT/OT  They stated they would try and arrange PT/OT services at home  Patient was told his trazadone was discontinued because of its syncopal side effects  He expressed he uses it to sleep but I advised him in an effort to reduce another syncopal episode that we would be holding it for now  Patient will continue use of ativan 1 mg BID  On first day of admission patient was fidgety and experienced an increased BP without the ativan  It was held before because it was thought that this drug may have also been contributing to the lightheadedness  As per cardiology his htn medications were changed  They discontinued the triamterene-HCTZ and added midodrine 2 5 TID  Patient's flomax and norvasc were discontinued by primary care team  Primary care team added back 2 5 mg of Norvasc to regimen  Physical Exam at Discharge    Physical Exam: Constitutional:       Appearance: Elderly male  HENT:      Head: Normocephalic and atraumatic  Cardiovascular:      Rate and Rhythm: Normal rate and regular rhythm  Pulmonary:      Effort: Pulmonary effort is normal       Breath sounds: Normal breath sounds  Abdominal:      General: Abdomen is flat       Palpations: Abdomen is soft     Neurological:      General: No focal deficit present       Mental Status: He is oriented to person, place, and time            Medications       START taking these medications     START taking these medications     Morning  Afternoon  Evening  Bedtime  As Needed     * midodrine 2 5 mg tablet   Commonly known as: PROAMATINE   Take 1 tablet (2 5 mg total) by mouth 3 (three) times a day before meals   Refills: 3   Last time this was given: Ask your nurse or doctor  Before breakfast  Before lunch  Before dinner       * midodrine 2 5 mg tablet   Commonly known as: PROAMATINE   Take 1 tablet (2 5 mg total) by mouth 3 (three) times a day   Refills: 0   Last time this was given: Ask your nurse or doctor         (very important)   * This list has 2 medication(s) that are the same as other medications prescribed for you  Read the directions carefully, and ask your doctor or other care provider to review them with you  CHANGE how you take these medications     CHANGE how you take these medications     Morning  Afternoon  Evening  Bedtime  As Needed     amLODIPine 2 5 mg tablet   Commonly known as: NORVASC   Start taking on: September 17, 2020   Take 1 tablet (2 5 mg total) by mouth daily   Refills: 3   Last time this was given: 2 5 mg on September 16, 2020  2:49 PM   What changed:    0 medication strength   0 how much to take         CONTINUE taking these medications     CONTINUE taking these medications     Morning  Afternoon  Evening  Bedtime  As Needed     budesonide 0 5 mg/2 mL nebulizer solution   Commonly known as: PULMICORT   Take 1 vial (0 5 mg total) by nebulization 2 (two) times a day for 5 days Rinse mouth after use     Refills: 0   Last time this was given: 0 5 mg on September 16, 2020  8:01 AM          busPIRone 10 mg tablet   Commonly known as: BUSPAR   TAKE ONE TABLET BY MOUTH THREE TIMES DAILY   Refills: 0   Last time this was given: 10 mg on September 16, 2020  4:29 PM          cholestyramine sugar free 4 g packet   Commonly known as: QUESTRAN LIGHT   Take 1 packet (4 g total) by mouth 2 (two) times a day   Refills: 1   Last time this was given: 4 g on September 16, 2020  8:30 AM          famotidine 20 mg tablet   Commonly known as: PEPCID   Take 1 tablet (20 mg total) by mouth daily   Refills: 3   Last time this was given: 20 mg on September 16, 2020  8:30 AM          finasteride 5 mg tablet   Commonly known as: PROSCAR   Take 5 mg by mouth every morning Refills: 0          fluticasone 50 mcg/act nasal spray   Commonly known as: FLONASE   2 sprays into each nostril daily   Refills: 5   Last time this was given: 2 sprays on September 16, 2020  8:30 AM          formoterol 20 MCG/2ML nebulizer solution   Commonly known as: PERFOROMIST   Take 20 mcg by nebulization 2 (two) times a day   Refills: 0   Last time this was given: 20 mcg on September 16, 2020  8:01 AM          gabapentin 300 mg capsule   Commonly known as: NEURONTIN   TAKE ONE CAPSULE BY MOUTH THREE TIMES DAILY   Refills: 0   Last time this was given: 300 mg on September 16, 2020  4:29 PM          guaiFENesin 100 MG/5ML oral liquid   Commonly known as: ROBITUSSIN   For: Cough   Take 200 mg by mouth 2 (two) times a day   Refills: 0   Last time this was given: 200 mg on September 16, 2020  8:30 AM          ipratropium-albuterol 0 5-2 5 mg/3 mL nebulizer solution   Commonly known as: DUO-NEB   Take 1 vial (3 mL total) by nebulization 4 (four) times a day   Refills: 6   Last time this was given: 3 mL on September 16, 2020  4:35 PM          LORazepam 1 mg tablet   Commonly known as: ATIVAN   TAKE ONE TABLET BY MOUTH TWICE DAILY   Refills: 0   Last time this was given: 1 mg on September 16, 2020 11:49 AM          pantoprazole 40 mg tablet   Commonly known as: PROTONIX   Take 1 tablet (40 mg total) by mouth 2 (two) times a day   Refills: 1   Last time this was given: 40 mg on September 16, 2020  4:29 PM          * tamsulosin 0 4 mg   Commonly known as: FLOMAX   Take 0 4 mg by mouth daily   Refills: 0          * tamsulosin 0 4 mg   Commonly known as: FLOMAX   Half a tablet daily   Refills: 5          Unisom SleepTabs 25 MG tablet   Generic drug: doxylamine   For: Trouble Sleeping   Take 25 mg by mouth daily at bedtime as needed for sleep   Refills: 0          Ventolin HFA 90 mcg/act inhaler   Generic drug: albuterol   Inhale 1 puff every 6 (six) hours as needed   Refills: 0   Doctor's comments: <! --EPICS-->Substitution to a formulary equivalent within the same pharmaceutical class is authorized  <!--EPICE-->         Zemaira infusion   Generic drug: alpha1-proteinase inhibitor   For: Every Monday   once a week   Refills: 0         (very important)   * This list has 2 medication(s) that are the same as other medications prescribed for you  Read the directions carefully, and ask your doctor or other care provider to review them with you  OTHER medications on file     OTHER medications on file     Morning  Afternoon  Evening  Bedtime  As Needed     OXYGEN-HELIUM IN   Inhale 2L at rest- 3L with activity   Refills: 0             Allergies  Allergies   Allergen Reactions    Chantix [Varenicline]      Caused prostate infection       Diet restrictions: Activity restrictions: Ambulate with walker, do not make sudden movements, patient on 3L NC  Code Status: Level 1 - Full Code  Advance Directive and Living Will: <no information>    Discharge Condition: stable      Discharge  Statement   I spent 30 minutes discharging the patient  This time was spent on the day of discharge  I had direct contact with the patient on the day of discharge  Additional documentation is required if more than 30 minutes were spent on discharge

## 2020-09-22 NOTE — SOCIAL WORK
Late note 9/22/20 - Pt was discharged home on 9/16/20  Confirmed home care agency that accepted case is [facility]  Per home care agency [facility] was 9/19/20

## 2020-09-23 ENCOUNTER — HOSPITAL ENCOUNTER (OUTPATIENT)
Dept: RADIOLOGY | Facility: HOSPITAL | Age: 75
Discharge: HOME | End: 2020-09-23
Payer: MEDICARE

## 2020-09-23 ENCOUNTER — TRANSCRIBE ORDERS (OUTPATIENT)
Dept: ADMINISTRATIVE | Facility: HOSPITAL | Age: 75
End: 2020-09-23

## 2020-09-23 ENCOUNTER — OFFICE VISIT (OUTPATIENT)
Dept: FAMILY MEDICINE CLINIC | Facility: CLINIC | Age: 75
End: 2020-09-23
Payer: MEDICARE

## 2020-09-23 ENCOUNTER — OFFICE VISIT (OUTPATIENT)
Dept: CARDIOLOGY CLINIC | Facility: CLINIC | Age: 75
End: 2020-09-23
Payer: MEDICARE

## 2020-09-23 VITALS
HEART RATE: 107 BPM | DIASTOLIC BLOOD PRESSURE: 78 MMHG | SYSTOLIC BLOOD PRESSURE: 124 MMHG | WEIGHT: 193.6 LBS | RESPIRATION RATE: 20 BRPM | TEMPERATURE: 98.4 F | BODY MASS INDEX: 22.86 KG/M2 | OXYGEN SATURATION: 95 % | HEIGHT: 77 IN

## 2020-09-23 VITALS
HEART RATE: 97 BPM | TEMPERATURE: 98.9 F | DIASTOLIC BLOOD PRESSURE: 72 MMHG | SYSTOLIC BLOOD PRESSURE: 130 MMHG | OXYGEN SATURATION: 96 % | WEIGHT: 193 LBS | BODY MASS INDEX: 22.79 KG/M2 | HEIGHT: 77 IN

## 2020-09-23 DIAGNOSIS — J96.11 CHRONIC RESPIRATORY FAILURE WITH HYPOXIA (HCC): ICD-10-CM

## 2020-09-23 DIAGNOSIS — I10 ESSENTIAL HYPERTENSION: ICD-10-CM

## 2020-09-23 DIAGNOSIS — R91.1 LUNG NODULE: ICD-10-CM

## 2020-09-23 DIAGNOSIS — R05.9 COUGH: ICD-10-CM

## 2020-09-23 DIAGNOSIS — G47.9 TROUBLE IN SLEEPING: ICD-10-CM

## 2020-09-23 DIAGNOSIS — E88.01 AAT (ALPHA-1-ANTITRYPSIN) DEFICIENCY (HCC): ICD-10-CM

## 2020-09-23 DIAGNOSIS — I95.1 ORTHOSTATIC HYPOTENSION: ICD-10-CM

## 2020-09-23 DIAGNOSIS — R42 LIGHTHEADEDNESS: ICD-10-CM

## 2020-09-23 DIAGNOSIS — Z09 HOSPITAL DISCHARGE FOLLOW-UP: Primary | ICD-10-CM

## 2020-09-23 PROCEDURE — 71046 X-RAY EXAM CHEST 2 VIEWS: CPT

## 2020-09-23 PROCEDURE — 99213 OFFICE O/P EST LOW 20 MIN: CPT | Performed by: FAMILY MEDICINE

## 2020-09-23 PROCEDURE — 99214 OFFICE O/P EST MOD 30 MIN: CPT | Performed by: INTERNAL MEDICINE

## 2020-09-23 RX ORDER — LANOLIN ALCOHOL/MO/W.PET/CERES
3 CREAM (GRAM) TOPICAL
Qty: 30 TABLET | Refills: 1 | Status: SHIPPED | OUTPATIENT
Start: 2020-09-23 | End: 2020-10-20 | Stop reason: ALTCHOICE

## 2020-09-23 NOTE — PROGRESS NOTES
Assessment/Plan:     1  Hospital discharge follow-up  - Continues with lightheadedness, likely from lack of use of compression stocking and inadequate water intake  Discussed with cardiology as patient has appointment this afternoon  Consider increasing midodrine to 5 mg TID  2  Cough  - Will obtain CXR to r/o acute infectious process  Also reached ot to pulmonology as patient has appointment tomorrow to proive him with a positive oscillatory pressure device at office to help move secretions  - XR chest pa & lateral; Future    3  Trouble in sleeping  - Recommended to stop current medication and take melatonin 3 mg 30 min before going to sleep    - melatonin 3 mg; Take 1 tablet (3 mg total) by mouth daily at bedtime (Patient not taking: Reported on 9/23/2020)  Dispense: 30 tablet; Refill: 1    Ample time provided during visit to answer all questions  Recommended to call back office with any question  Patient acknowledged understanding and verbally agreed with plan  Addendum: called patient to inform CXR with no acute cardiopulmonary disease  Subjective:     Patient ID: Marely Schmidt is a 67 y o  male  HPI     66 y/o M with PMHx alpha 1 antitrypsin deficiency, GERD, centrilobular emphysema, COPD, orthostatic hypotension with supine HTN, HTN, pulmonary HTN, BPH, insomnia  Patient here for follow up of recent hospitalization due to syncopal episode at home  Evaluated by cardiology, patient had positive tilt table test, diagnosed with orthostatic hypotension with supine hypertension  Patient had medication adjustments including stopping flomax, triamterene-HCTZ, and trazodone  Patient started on midodrine 2 5 mg TID, decrease norvasc to 2 5 mg qd, wear compression stocking and wear compression stockings  Patient today reports he feels weak and tired, states not getting enough sleep since trazodone was stopped  Reports he continues to feel lightheadedness with change in position   Reports he is not using compression stockings and not drinking the 2 L of fluid he was instructed to  Reports has home PT sessions 2 times a week  States he uses cane at home to get around, sometimes uses walker  Patient reports concern for cough, which he has had for the past 3 weeks  States he feels chest congested, has noticed yellow sputum  Also has runny nose  Reports cough worsen with neb treatment, has tried robitussin which does not help  Cough worsens through the day but improves at night  Reports chills, denies fever  Review of Systems    Pertinent findings as per HPI    Objective:  /78 (BP Location: Left arm, Patient Position: Sitting, Cuff Size: Standard)   Pulse (!) 107   Temp 98 4 °F (36 9 °C) (Tympanic)   Resp 20   Ht 6' 5" (1 956 m)   Wt 87 8 kg (193 lb 9 6 oz)   SpO2 95%   BMI 22 96 kg/m²      Physical Exam  Vitals signs reviewed  Constitutional:       Appearance: Normal appearance  He is normal weight  He is not ill-appearing  HENT:      Head: Normocephalic and atraumatic  Nose: Nose normal       Mouth/Throat:      Mouth: Mucous membranes are moist       Pharynx: No oropharyngeal exudate or posterior oropharyngeal erythema  Eyes:      General: No scleral icterus  Extraocular Movements: Extraocular movements intact  Conjunctiva/sclera: Conjunctivae normal    Cardiovascular:      Rate and Rhythm: Normal rate and regular rhythm  Pulmonary:      Effort: Pulmonary effort is normal  No respiratory distress  Breath sounds: Normal breath sounds  Musculoskeletal:      Right lower leg: Edema present  Left lower leg: Edema present  Comments: Edema 1+   Neurological:      Mental Status: He is alert and oriented to person, place, and time  Mental status is at baseline

## 2020-09-23 NOTE — PROGRESS NOTES
Progress Note - Cardiology Office  [facility]    Fransisco Scott 67 y o  male MRN: [id number]  : 1945  Encounter: [id number]      Assessment:     1  Orthostatic hypotension with supine hypertension    2  Essential hypertension    3  Lightheadedness    4  AAT (alpha-1-antitrypsin) deficiency ([address] )    5  Chronic respiratory failure with hypoxia (HCC)    6  Lung nodule        Discussion Summary and Plan:  1  Syncope/orthostatic hypotension  Patient is known to have orthostatic hypotension with positive tilt-table test   He was on multiple medications including high dose of calcium channel blocker and diuretics  He was driving admitted  We start him back on low-dose amlodipine and 2 5 mg midodrine  Cannot increase the dose due to history of hypertension  Advised him to drink 2 L of fluid  He need to use thigh-high sports stocking which he still not using  He can remove it during nighttime  This was discussed with him at length  Change positions slowly  2  COPD with chronic hypoxic respiratory failure  Patient has of antitrypsin deficiency  Management as per Pulmonary    3  Essential hypertension  Currently blood pressure is acceptable  May need close monitoring if blood pressure increases we may need to decrease the dose of midodrine  4  Chronic lightheadedness  Patient is lightheadedness despite blood pressure in 130 to 140s  Consider noncardiac causes    5  History of lung nodule management as per Pulmonary    6  History of anxiety and depression    All those issues discussed with patient at length again lifestyle changes discussed with him  Need to keep himself hydrated you stockings  Change positions slowly  Please call [phone] if any questions  Counseling :  A description of the counseling  Goals and Barriers    Patient's ability to self care: Yes  Medication side effect reviewed with patient in detail and all their questions answered to their satisfaction  HPI :     Toño Strickland is a 67y o  year old male who came for follow up  Patient was recently admitted to [facility] with syncopal episode  Patient felt dizzy and lightheadedness when he stood up  He was found to be orthostatic positive  He also history of hypertension and was taking Norvasc 10 mg, Dyazide 37 5/25 mg daily and he was dehydrated  He had significant drop his blood pressure  Patient's Norvasc was decreased to 2 5 and he was added midodrine  His blood pressure now 137/70  He still has dizziness and lightheadedness  Discharge summary and hospital chart reviewed  He has medical history of COPD with alpha-1 antitrypsin deficiency, hypertension, anxiety and depression  He was prescribed Luiz stocking he still not using it  He has repair at home he will be 7  No nausea no vomiting no other issues at this time  His other main complaint is that he has difficulty in sleeping he spoke to his medical doctors regarding it  As he history of emphysema and chronic hypoxia he uses 3 L oxygen all the time  He lives alone  He finds it hard to use does stockings  Review of Systems   Constitutional: Negative for activity change, chills, diaphoresis, fever and unexpected weight change  HENT: Negative for congestion  Eyes: Negative for discharge and redness  Respiratory: Positive for shortness of breath and wheezing  Negative for cough and chest tightness  Cardiovascular: Negative  Negative for chest pain, palpitations and leg swelling  Gastrointestinal: Negative for abdominal pain, diarrhea and nausea  Endocrine: Negative  Genitourinary: Negative for decreased urine volume and urgency  Musculoskeletal: Positive for arthralgias and back pain  Negative for gait problem  Skin: Negative for rash and wound  Allergic/Immunologic: Negative  Neurological: Positive for dizziness   Negative for seizures, syncope, weakness, light-headedness and headaches  Positive for orthostasis   Hematological: Negative  Psychiatric/Behavioral: Positive for sleep disturbance  Negative for agitation and confusion  The patient is nervous/anxious  Historical Information   Past Medical History:   Diagnosis Date    Anesthesia complication     Difficult to wake up    Arthritis     BPH (benign prostatic hyperplasia)     urinary frequency    Cancer (HCC)     basal cell neck, face    [facility] 12[facility]    Full dentures     Hiatal hernia     History of methicillin resistant staphylococcus aureus (MRSA)     10/11/2018 MRSA (nares) positive    Irritable bowel syndrome     Kidney stone     at least 7 episodes    Liver disease     Alpha 1- enzyme deficiency - diagnosed 2002  has been on weekly replacement therapy since then    Pulmonary emphysema ([facility] )     1/25/15  FEV1 - 2 45 liters or 59% of predicted    RSV infection 12/2017    Wears glasses     for driving only     Past Surgical History:   Procedure Laterality Date    BACK SURGERY  2008    discectomy    COLONOSCOPY      COLONOSCOPY N/A 3/10/2017    Procedure: [place];  Surgeon: Poli Sage MD;  Location: [place] [address] GI LAB; Service:    [facility]      removal of kidney stones    ESOPHAGOGASTRODUODENOSCOPY N/A 3/10/2017    Procedure: ESOPHAGOGASTRODUODENOSCOPY (EGD); Surgeon: Poli Sage MD;  Location: [facility] GI LAB; Service:     LITHOTRIPSY      NM ESOPHAGOGASTRODUODENOSCOPY TRANSORAL DIAGNOSTIC N/A 11/20/2018    Procedure: ESOPHAGOGASTRODUODENOSCOPY (EGD); Surgeon: Poli Sage MD;  Location: [facility] GI LAB;   Service: Gastroenterology    TONSILLECTOMY      VEIN LIGATION AND STRIPPING Bilateral     1980's     Social History     Substance and Sexual Activity   Alcohol Use Not Currently    Frequency: Never    Comment: stopped in 2009     Social History     Substance and Sexual Activity   Drug Use Not Currently     Social History     Tobacco Use   Smoking Status Former Smoker    Packs/day: 1 00    Years: 60 00    Pack years: 60 00    Last attempt to quit: 8/31/2017    Years since quitting: 3 0   Smokeless Tobacco Never Used   Tobacco Comment    quit in august 2017     Family History:   Family History   Problem Relation Age of Onset    Emphysema Mother         never smoked    Emphysema Father     Cancer Brother         colon    Colon cancer Brother     Ulcerative colitis Family     Liver disease Family        Meds/Allergies     Allergies   Allergen Reactions    Chantix [Varenicline]      Caused prostate infection       Current Outpatient Medications:     amLODIPine (NORVASC) 2 5 mg tablet, Take 1 tablet (2 5 mg total) by mouth daily, Disp: 30 tablet, Rfl: 3    budesonide (PULMICORT) 0 5 mg/2 mL nebulizer solution, Take 1 vial (0 5 mg total) by nebulization 2 (two) times a day for 5 days Rinse mouth after use , Disp: 10 vial, Rfl: 0    busPIRone (BUSPAR) 10 mg tablet, TAKE ONE TABLET BY MOUTH THREE TIMES DAILY , Disp: 90 tablet, Rfl: 0    cholestyramine sugar free (QUESTRAN LIGHT) 4 g packet, Take 1 packet (4 g total) by mouth 2 (two) times a day, Disp: 60 packet, Rfl: 1    doxylamine (Unisom SleepTabs) 25 MG tablet, Take 25 mg by mouth daily at bedtime as needed for sleep, Disp: , Rfl:     famotidine (PEPCID) 20 mg tablet, Take 1 tablet (20 mg total) by mouth daily, Disp: 30 tablet, Rfl: 3    finasteride (PROSCAR) 5 mg tablet, Take 5 mg by mouth every morning  , Disp: , Rfl:     fluticasone (FLONASE) 50 mcg/act nasal spray, 2 sprays into each nostril daily, Disp: 16 g, Rfl: 5    formoterol (PERFOROMIST) 20 MCG/2ML nebulizer solution, Take 20 mcg by nebulization 2 (two) times a day, Disp: , Rfl:     gabapentin (NEURONTIN) 300 mg capsule, TAKE ONE CAPSULE BY MOUTH THREE TIMES DAILY , Disp: 90 capsule, Rfl: 0    guaiFENesin (ROBITUSSIN) 100 MG/5ML oral liquid, Take 200 mg by mouth 2 (two) times a day, Disp: , Rfl:     ipratropium-albuterol (DUO-NEB) 0 5-2 5 mg/3 mL nebulizer solution, Take 1 vial (3 mL total) by nebulization 4 (four) times a day, Disp: 120 vial, Rfl: 6    LORazepam (ATIVAN) 1 mg tablet, TAKE ONE TABLET BY MOUTH TWICE DAILY , Disp: 60 tablet, Rfl: 0    midodrine (PROAMATINE) 2 5 mg tablet, Take 1 tablet (2 5 mg total) by mouth 3 (three) times a day before meals, Disp: 30 tablet, Rfl: 3    OXYGEN-HELIUM IN, Inhale 2L at rest- 3L with activity, Disp: , Rfl:     pantoprazole (PROTONIX) 40 mg tablet, Take 1 tablet (40 mg total) by mouth 2 (two) times a day, Disp: 180 tablet, Rfl: 1    VENTOLIN  (90 Base) MCG/ACT inhaler, Inhale 1 puff every 6 (six) hours as needed , Disp: , Rfl:     ZEMAIRA infusion, once a week , Disp: , Rfl:     melatonin 3 mg, Take 1 tablet (3 mg total) by mouth daily at bedtime (Patient not taking: Reported on 9/23/2020), Disp: 30 tablet, Rfl: 1    tamsulosin (FLOMAX) 0 4 mg, Half a tablet daily (Patient not taking: Reported on 9/13/2020), Disp: 30 capsule, Rfl: 5    tamsulosin (FLOMAX) 0 4 mg, Take 0 4 mg by mouth daily, Disp: , Rfl:     Vitals: Blood pressure 130/72, pulse 97, temperature 98 9 °F (37 2 °C), temperature source Temporal, height 6' 5" (1 956 m), weight 87 5 kg (193 lb), SpO2 96 %  Body mass index is 22 89 kg/m²    Vitals:    09/23/20 1431   Weight: 87 5 kg (193 lb)     BP Readings from Last 3 Encounters:   09/23/20 130/72   09/23/20 124/78   09/16/20 152/83       Physical Exam:Physical Exam    Neurologic:  Alert & oriented x 3, no new focal deficits, Not in any acute distress,  Constitutional:  Well developed, well nourished, non-toxic appearance   Eyes:  Pupil equal and reacting to light, conjunctiva normal,   HENT:  Atraumatic, oropharynx moist, Neck- normal range of motion, no tenderness,  Neck supple, No JVP, No LNP   Respiratory:  Bilateral air entry, which is bilaterally decreased with coarse breath sound and try crackles  Cardiovascular: S1-S2 regular with a 2/6 ejection systolic murmur and S4 is present  GI:  Soft, nondistended, normal bowel sounds, nontender, no hepatosplenomegaly appreciated  Musculoskeletal:  No edema, no tenderness, no deformities  Skin:  Well hydrated, no rash   Lymphatic:  No lymphadenopathy noted   Extremities:  No edema       Diagnostic Studies Review Cardio:  Echo reviewed    EKG:    Cardiac testing:   Results for orders placed during the hospital encounter of 19   Echo complete with contrast if indicated    Narrative [address]  [address] [facility]  [phone]    Transthoracic Echocardiogram  2D, M-mode, Doppler, and Color Doppler    Study date:  2019    Patient: Erlin Sanchez  MR number: [id number]  Account number: [de-identified]  : 1945  Age: 75 years  Gender: Male  Status: Inpatient  Location: Bedside  Height: 77 in  Weight: 186 6 lb  BP: 124/ 72 mmHg    Indications: Dyspnea    Diagnoses: R06 00 - Dyspnea, unspecified    Sonographer:  JOSE Saldaña  Primary Physician:  Gladys Rosales MD  Referring Physician:  Erin Francis PA-C  Group:  [facility]  Interpreting Physician:  DO HEAVEN Aldana    LEFT VENTRICLE:  Systolic function was normal by visual assessment  Ejection fraction was estimated to be 60 %  There were no regional wall motion abnormalities  There was moderate concentric hypertrophy  Doppler parameters were consistent with abnormal left ventricular relaxation (grade 1 diastolic dysfunction)  RIGHT VENTRICLE:  The ventricle was mildly dilated  RIGHT ATRIUM:  The atrium was mildly dilated  MITRAL VALVE:  There was mild regurgitation  AORTIC VALVE:  There was no evidence for stenosis  There was no regurgitation  TRICUSPID VALVE:  There was mild regurgitation  Pulmonary artery systolic pressure was mildly to moderately increased  PERICARDIUM:  A small pericardial effusion was identified      HISTORY: PRIOR HISTORY: HTN, COPD, Emphysema, IBS, Skin Cancer, BPH    PROCEDURE: The procedure was performed at the bedside  This was a routine study  The transthoracic approach was used  The study included complete 2D imaging, M-mode, complete spectral Doppler, and color Doppler  The heart rate was 73 bpm,  at the start of the study  Image quality was adequate  LEFT VENTRICLE: Size was normal  Systolic function was normal by visual assessment  Ejection fraction was estimated to be 60 %  There were no regional wall motion abnormalities  There was moderate concentric hypertrophy  DOPPLER: Doppler  parameters were consistent with abnormal left ventricular relaxation (grade 1 diastolic dysfunction)  RIGHT VENTRICLE: The ventricle was mildly dilated  Systolic function was normal     LEFT ATRIUM: The atrium was mildly dilated  No thrombus was identified  RIGHT ATRIUM: The atrium was mildly dilated  MITRAL VALVE: There was annular calcification  Valve structure was normal  There was normal leaflet separation  No echocardiographic evidence for prolapse  DOPPLER: The transmitral velocity was within the normal range  There was no  evidence for stenosis  There was mild regurgitation  AORTIC VALVE: The valve was trileaflet  Leaflets exhibited normal thickness, calcification, and normal cuspal separation  DOPPLER: Transaortic velocity was within the normal range  There was no evidence for stenosis  There was no  regurgitation  TRICUSPID VALVE: The valve structure was normal  There was normal leaflet separation  DOPPLER: The transtricuspid velocity was within the normal range  There was mild regurgitation  Pulmonary artery systolic pressure was mildly to  moderately increased  Estimated peak PA pressure was 51 mmHg  PULMONIC VALVE: Leaflets exhibited normal thickness, no calcification, and normal cuspal separation  DOPPLER: The transpulmonic velocity was within the normal range  There was no regurgitation      PERICARDIUM: There was no thickening  A small pericardial effusion was identified  AORTA: The root exhibited normal size and fibrocalcific change  PULMONARY ARTERY: The size was normal  The morphology appeared normal     SYSTEM MEASUREMENT TABLES    2D mode  AoR Diam 2D: 3 5 cm  LA Diam (2D): 4 cm  LA/Ao (2D): 1 14  FS (2D Teich): 32 6 %  IVSd (2D): 1 3 cm  LVDEV: 100 cmï¾³  LVESV: 39 1 cmï¾³  LVIDd(2D): 4 66 cm  LVISd (2D): 3 14 cm  LVPWd (2D): 1 27 cm  SV (Teich): 60 9 cmï¾³    Apical four chamber  LVEF A4C: 56 %    Unspecified Scan Mode  MV Peak A Myron: 888 mm/s  MV Peak E Myron  Mean: 775 mm/s  MVA (PHT): 3 86 cmï¾²  PHT: 57 ms  Max P mm[Hg]  V Max: 3270 mm/s  Vmax: 3270 mm/s  RA Area: 17 4 cmï¾²  RA Volume: 41 5 cmï¾³  TAPSE: 2 2 cm    [facility] Accredited Echocardiography Laboratory    Prepared and electronically signed by    Jing William DO  Signed 2019 10:23:48       No results found for this or any previous visit  Results for orders placed during the hospital encounter of 11/10/19   NM Myocardial Perfusion Spect (Pharmacological Induced Stress and/or Rest)    [address]  [address] [facility] [place]   [phone]    Rest/Stress Gated SPECT Myocardial Perfusion Imaging After Regadenoson    Patient: Charol Snellen  MR number: [id number]  Account number: [de-identified]  : 1945  Age: 75 years  Gender: Male  Status: Inpatient  Location: Stress lab  Height: 77 in  Weight: 192 lb  BP: 172/ 80 mmHg    Allergies: VARENICLINE    Diagnosis: I21 4 - Non-ST elevation (NSTEMI) myocardial infarction, R07 9 - Chest pain, unspecified    Primary Physician:  Mario Lehman MD  RN:  ANA Longoria  Group:  [facility]  Report Prepared By[de-identified]  ANA Longoria  Interpreting Physician:  Dona Hauser MD    INDICATIONS: Evaluation for coronary artery disease  HISTORY: The patient is a 75year old  male  Chest pain status: chest pain   Other symptoms: dyspnea  Coronary artery disease risk factors: hypertension  Cardiovascular history: none significant  Co-morbidity: history of lung  disease  Medications: a calcium channel blocker  PHYSICAL EXAM: Baseline physical exam screening: scant wheezing audible  REST ECG: Normal sinus rhythm  PROCEDURE: The study was performed in the the Stress lab  A regadenoson infusion pharmacologic stress test was performed  Gated SPECT myocardial perfusion imaging was performed after stress and at rest  Systolic blood pressure was 172  mmHg, at the start of the study  Diastolic blood pressure was 80 mmHg, at the start of the study  The heart rate was 92 bpm, at the start of the study  IV double checked  DOBUTAMINE PROTOCOL:  Time HR bpm SBP mmHg DBP mmHg Symptoms Rhythm/conduct  Baseline 13:31 93 172 80 none NSR  10 mcg/kg/min 13:34 92 180 86 none same as above  15 mcg/kg/min 13:37 100 195 89 none --  20 mcg/kg/min 13:38 107 183 69 chest discomfort, dizziness --  Immediate 13:43 134 187 81 same as above --  Recovery I 13:46 114 178 74 subsiding --  Recovery II 13:48 105 173 79 subsiding --  Recovery III 13:50 97 174 79 none --  No medications or fluids given  STRESS SUMMARY: Duration of pharmacologic stress was 10 min and 5 sec   total dose given= 13 4 mg Maximal heart rate during stress was 139 bpm ( 96 % of maximal predicted heart rate)  The heart rate response to stress was normal  There was resting hypertension with a hypertensive blood pressure response to  stress  The rate-pressure product for the peak heart rate and blood pressure was 46571  The patient experienced chest pain during stress; pain resolved spontaneously  The stress test was terminated due to protocol completion and  achievement of target heart rate  Pre oxygen saturation: 97 %  The stress ECG was equivocal for ischemia  Arrhythmia during stress: isolated premature ventricular beats      ISOTOPE ADMINISTRATION:  Resting isotope administration Stress isotope administration  Agent Tetrofosmin Tetrofosmin  Dose 11 mCi 32 4 mCi  Date 11/11/2019 11/11/2019    Stress isotope administration  Agent Tetrofosmin  Dose 11 mCi  Date 11/11/2019    The radiopharmaceutical was injected at the peak effect of pharmacologic stress  MYOCARDIAL PERFUSION IMAGING:  The image quality was fair  Rotating projection images reveal moderate diaphragmatic attenuation and moderate patient motion  Left ventricular size was normal  The TID ratio was 1 1  The right ventricle was dilated  PERFUSION DEFECTS:  -  There was a moderate-sized, moderately severe, fixed myocardial perfusion defect of the entire inferior wall likely due to diaphragm attenuation as it normalizes in prone images  GATED SPECT:  The calculated left ventricular ejection fraction was 75 %  Left ventricular ejection fraction was within normal limits by visual estimate  There was no left ventricular regional abnormality  SUMMARY:  -  Stress results: Target heart rate was achieved  There was resting hypertension with a hypertensive blood pressure response to stress  The patient experienced chest pain during stress; pain resolved spontaneously  -  ECG conclusions: The stress ECG was equivocal for ischemia  -  Perfusion imaging: There was a moderate-sized, moderately severe, fixed myocardial perfusion defect of the entire inferior wall likely due to diaphragm attenuation as it normalizes in prone images  -  Gated SPECT: The calculated left ventricular ejection fraction was 75 %  Left ventricular ejection fraction was within normal limits by visual estimate  There was no left ventricular regional abnormality   -  Impressions and recommendations: Probably normal study after pharmacologic stress without reproduction of symptoms  IMPRESSIONS: Probably normal study after pharmacologic stress without reproduction of symptoms   There was image artifact, without diagnostic evidence for perfusion abnormality  Left ventricular systolic function was normal     Prepared and signed by    Merlin Lundberg MD  Signed 11/12/2019 11:00:30           Imaging:  Chest X-Ray:   Xr Chest Pa & Lateral    Result Date: 9/23/2020  Impression No acute cardiopulmonary disease  Emphysema  Workstation performed: [id number]         Result Date: 8/19/2020  Impression 1  No acute pulmonary emboli  Stable appearance of chronic left lower lobe pulmonary embolus  2   New micronodules in the left upper lobe suggesting infectious or inflammatory bronchiolitis  3   COPD  1 cm spiculated left lower lobe nodule seen on previous study is not as well visualized currently, possibly related to respiratory motion  No significant enlargement within limitations  Continued CT surveillance in 6 months advised  4  No acute intra-abdominal abnormality  Numerous incidental findings as detailed above  Workstation performed: [id number]     Cta Chest Pe Study    Result Date: 5/5/2020  Impression 1  Stable spiculated 1 cm left lower lobe nodule  Although this did not demonstrate FDG avidity on recent PET/CT, possibly of malignancy remains in the differential   Continued follow-up CT chest in 6 months is advised  2   No change in chronic left lower lobe pulmonary embolus  3   COPD and stable small nodular densities elsewhere as above  The study was marked in [facility] for significant notification and follow-up  Workstation performed: [id number]     Cta Chest Pe Study    Result Date: 1/29/2020  Impression 1  Apparent slight enlargement of spiculated left lower lobe 10 mm nodule  Although this did not demonstrate FDG avidity on recent PET/CT, this remains suspicious for malignancy  Nonemergent cardiothoracic surgical consultation advised if not previously performed  At minimum, follow-up CT chest in 3 months is advised  2   No acute pulmonary embolus to the segmental level  No change in chronic left lower lobe pulmonary embolus  3   COPD    New 6 mm tubular opacity in the anterior left apex, presumably inflammatory pseudonodule  This can be reevaluated at follow-up imaging  The study was marked in epic for follow-up  Workstation performed: [id number]         Lab Review   Lab Results   Component Value Date    WBC 8 65 09/16/2020    HGB 13 4 09/16/2020    HCT 39 8 09/16/2020    MCV 97 09/16/2020    RDW 13 1 09/16/2020     (L) 09/16/2020     BMP:  Lab Results   Component Value Date    SODIUM 140 09/16/2020    K 3 8 09/16/2020     09/16/2020    CO2 26 09/16/2020    BUN 15 09/16/2020    CREATININE 1 36 (H) 09/16/2020    GLUC 105 09/16/2020    GLUF 185 (H) 12/04/2019    CALCIUM 8 6 09/16/2020    EGFR 50 09/16/2020    MG 1 9 09/16/2020     LFT:  Lab Results   Component Value Date    AST 56 (H) 09/13/2020     (H) 09/13/2020    ALKPHOS 73 09/13/2020    TP 6 7 09/13/2020    ALB 3 6 09/13/2020      Lab Results   Component Value Date    DHP9PCDFGEYD 1 148 09/04/2020     Lab Results   Component Value Date    HGBA1C 5 2 08/29/2019     Lipid Profile:   Lab Results   Component Value Date    CHOLESTEROL 167 11/11/2019    HDL 40 11/11/2019    LDLCALC 114 (H) 11/11/2019    TRIG 66 11/11/2019     Lab Results   Component Value Date    CHOLESTEROL 167 11/11/2019     Lab Results   Component Value Date    TROPONINI <0 02 09/13/2020     Lab Results   Component Value Date    NTBNP 217 08/26/2020              Dr Scooby Dalal MD [facility]      "This note has been constructed using a voice recognition system  Therefore there may be syntax, spelling, and/or grammatical errors   Please call if you have any questions  "

## 2020-09-24 ENCOUNTER — OFFICE VISIT (OUTPATIENT)
Dept: PULMONOLOGY | Facility: MEDICAL CENTER | Age: 75
End: 2020-09-24
Payer: MEDICARE

## 2020-09-24 VITALS
SYSTOLIC BLOOD PRESSURE: 132 MMHG | TEMPERATURE: 97.8 F | HEART RATE: 90 BPM | DIASTOLIC BLOOD PRESSURE: 70 MMHG | RESPIRATION RATE: 16 BRPM | BODY MASS INDEX: 23.14 KG/M2 | HEIGHT: 76 IN | OXYGEN SATURATION: 97 % | WEIGHT: 190 LBS

## 2020-09-24 DIAGNOSIS — J43.8 OTHER EMPHYSEMA (HCC): ICD-10-CM

## 2020-09-24 DIAGNOSIS — J43.2 CENTRILOBULAR EMPHYSEMA (HCC): ICD-10-CM

## 2020-09-24 DIAGNOSIS — E88.01 AAT (ALPHA-1-ANTITRYPSIN) DEFICIENCY (HCC): ICD-10-CM

## 2020-09-24 DIAGNOSIS — J96.11 CHRONIC RESPIRATORY FAILURE WITH HYPOXIA (HCC): ICD-10-CM

## 2020-09-24 DIAGNOSIS — R91.1 LUNG NODULE: ICD-10-CM

## 2020-09-24 DIAGNOSIS — N40.1 BENIGN PROSTATIC HYPERPLASIA WITH URINARY OBSTRUCTION: Primary | Chronic | ICD-10-CM

## 2020-09-24 DIAGNOSIS — I27.21 PAH (PULMONARY ARTERY HYPERTENSION) (HCC): ICD-10-CM

## 2020-09-24 DIAGNOSIS — J44.1 COPD EXACERBATION (HCC): Primary | ICD-10-CM

## 2020-09-24 DIAGNOSIS — N13.8 BENIGN PROSTATIC HYPERPLASIA WITH URINARY OBSTRUCTION: Primary | Chronic | ICD-10-CM

## 2020-09-24 PROCEDURE — 99215 OFFICE O/P EST HI 40 MIN: CPT | Performed by: PHYSICIAN ASSISTANT

## 2020-09-24 RX ORDER — FORMOTEROL FUMARATE 20 UG/2ML
20 SOLUTION RESPIRATORY (INHALATION) 2 TIMES DAILY
Qty: 60 VIAL | Refills: 11 | Status: SHIPPED | OUTPATIENT
Start: 2020-09-24 | End: 2020-12-07 | Stop reason: SDUPTHER

## 2020-09-24 RX ORDER — FINASTERIDE 5 MG/1
TABLET, FILM COATED ORAL
Qty: 90 TABLET | Refills: 0 | OUTPATIENT
Start: 2020-09-24

## 2020-09-24 RX ORDER — FINASTERIDE 5 MG/1
5 TABLET, FILM COATED ORAL EVERY MORNING
Qty: 90 TABLET | Refills: 3 | Status: SHIPPED | OUTPATIENT
Start: 2020-09-24 | End: 2021-10-21

## 2020-09-24 RX ORDER — BUDESONIDE 0.5 MG/2ML
0.5 INHALANT ORAL 2 TIMES DAILY
Qty: 10 VIAL | Refills: 0 | Status: SHIPPED | OUTPATIENT
Start: 2020-09-24 | End: 2020-12-07 | Stop reason: SDUPTHER

## 2020-09-24 NOTE — PROGRESS NOTES
Assessment/Plan:    Problem List Items Addressed This Visit        Respiratory    Chronic respiratory failure with hypoxia ([facility] )    Relevant Orders    Ambulatory referral to Palliative Care    Centrilobular emphysema (HCC)    Relevant Medications    budesonide (PULMICORT) 0 5 mg/2 mL nebulizer solution    formoterol (PERFOROMIST) 20 MCG/2ML nebulizer solution    Ventolin  (90 Base) MCG/ACT inhaler    Other Relevant Orders    Ambulatory referral to Palliative Care       Other    Lung nodule    Relevant Orders    CT chest without contrast      Other Visit Diagnoses     COPD exacerbation ([facility] )    -  Primary    Relevant Medications    budesonide (PULMICORT) 0 5 mg/2 mL nebulizer solution    formoterol (PERFOROMIST) 20 MCG/2ML nebulizer solution    Ventolin  (90 Base) MCG/ACT inhaler    Other Relevant Orders    Ambulatory referral to Palliative Care            Plan for today/next follow-up:    COPD w/ Alpha 1 Antitrypsin Deficiency:  -referral to palliative care  -continue weekly Zemaira injections on Mondays  -continue with visiting nurses  -continue perforomist / Masood Vega / Vivi Botello  -please notify once you are finished with VNA and we will set you up on PALS program     Chronic Hypoxemic Respiratory Failure:  -continue 3 L NC  -titrate for 02 sat 89-93%  -referral to palliative care    PAH w/ Chronic PE:  -at minimum CTEPH contributor  -referral to [facility] specialist Dr Vega Schofield    Lung Nodule:  -follow up CT imaging February 2020    Follow up in 6 months      No follow-ups on file  All questions are answered to the patient's satisfaction and understanding  He verbalizes understanding  He is encouraged to call with any further questions or concerns      ______________________________________________________________________    Chief Complaint:   Chief Complaint   Patient presents with    Follow-up    Shortness of Breath    Wheezing    Cough       Patient ID: Trey Marie is a 67 y o  y o  male has a past medical history of Anesthesia complication, Arthritis, BPH (benign prostatic hyperplasia), Cancer ([facility] ), [facility] ([facility] ) (12/29/2019), Full dentures, Hiatal hernia, History of methicillin resistant staphylococcus aureus (MRSA), Irritable bowel syndrome, Kidney stone, Liver disease, Pulmonary emphysema ([facility] ), RSV infection (12/2017), and Wears glasses  9/24/2020  Patient presents today for follow-up visit for:  Post Hospitalization Follow up   Hospitalized 9/13-9/16 post syncopal event on his porch  He has known severe bullous emphysema, alpha-1 antitrypsin deficiency on weekly Mondays and Zemaira injections chronic hypoxemic respiratory failure on 3 liters cannula and his COPD chronically controlled with Perforomist and Pulmicort  He has attended pulmonary rehab to completion in the past     He was referred back to pulmonary for chronic breathlessness  He also is chronically dizzy  In the hospital he had a tilt-table test which was within normal range  He otherwise had a normal limited cardiac workup  D/C'd home w/ VNA    In the past, regarding his COPD, he had trialed using the "Life 2000" ventilator system  He had difficulty maintaining this given the fact that he had a carry both the strap controller device as well as an oxygen tank to be really uses outside  He reports prior to this he was simply using for approximately an hour a day in the morning and nighttime when he was resting  He did feel that he benefitted with it at those times, however, it was difficult to utilize this system for him even when ambulating around his own home  His hypoxemia is not his limiting factor so much as his chronic breathlessness  He discussed that he is overall stable, however, has chronic dyspnea does limit him from a functional status and he does not ambulate well outside his home  He still lives alone and 55+ community in his own home  Non assisted living    He does have visiting nurses come to deliver his weekly injections and currently has visiting nurses post hospitalization  We discussed the possibility of trialing the life 2000 ventilator again  We also did discuss following up with palliative care to help with ameliorating his chronic breathlessness  He is agreeable to this  Lastly, regarding his historical lung nodule and smoking history we did order follow-up CT scan to be performed in February of 2021  He discussed that if there are changes and this nodule demonstrates instability that he could possibly consider biopsy evaluation  We will discuss this more in the future  We discussed follow-up in approximately 6 months  Also discussed need for follow-up with Cardiology regarding ongoing workup for possible    Medications refilled, scripts for Perforomist and budesonide sent to [address]      Discussed referral to Dr Vega Schofield for Chronic PE and 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     Smoking history: 60 pack year smoking history   Occupational history:  deferred  Environmental History:no environmental factors or PETs  Travel history:No recent travel > does not travel from home routinely  Respiratory History:Severe Emphysematous COPD / Alpha 1 Antitrypsin Deficiency > perforomist / pulmicort / duonebs  Oxygen Therapy:3 L rest / 5 L amb w/ POC  PAP Therapy:No CPAP or BIPAP  DME Company:[employer]  Rx Insurance:Medicare / [facility]    Review of Systems   Constitutional: Negative for activity change, chills, fatigue, fever and unexpected weight change  HENT: Negative for congestion, postnasal drip and rhinorrhea  Eyes: Negative for visual disturbance  Respiratory: Positive for shortness of breath  Negative for cough, chest tightness, wheezing and stridor  Cardiovascular: Negative for chest pain and leg swelling  Gastrointestinal: Negative for abdominal pain, diarrhea, nausea and vomiting  Endocrine: Negative for cold intolerance and heat intolerance     Genitourinary: Negative for flank pain    Musculoskeletal: Negative for arthralgias, joint swelling and myalgias  Skin: Negative for color change  Allergic/Immunologic: Negative for environmental allergies  Neurological: Negative for syncope and light-headedness  Hematological: Negative for adenopathy  Psychiatric/Behavioral: Negative for confusion  The following portions of the patient's history were reviewed and updated as appropriate: allergies, current medications, past family history, past medical history, past social history, past surgical history and problem list     Smoking history: He reports that he quit smoking about 3 years ago  He has a 60 00 pack-year smoking history  He has never used smokeless tobacco   Social history: He reports that he quit smoking about 3 years ago  He has a 60 00 pack-year smoking history  He has never used smokeless tobacco  He reports previous alcohol use  He reports previous drug use  Past Medical History:   Diagnosis Date    Anesthesia complication     Difficult to wake up    Arthritis     BPH (benign prostatic hyperplasia)     urinary frequency    Cancer (HCC)     basal cell neck, face    COPD exacerbation (HCC) 12/29/2019    Full dentures     Hiatal hernia     History of methicillin resistant staphylococcus aureus (MRSA)     10/11/2018 MRSA (nares) positive    Irritable bowel syndrome     Kidney stone     at least 7 episodes    Liver disease     Alpha 1- enzyme deficiency - diagnosed 2002  has been on weekly replacement therapy since then    Pulmonary emphysema ([address] )     1/25/15  FEV1 - 2 45 liters or 59% of predicted    RSV infection 12/2017    Wears glasses     for driving only     Past Surgical History:   Procedure Laterality Date    BACK SURGERY  2008    discectomy    COLONOSCOPY      COLONOSCOPY N/A 3/10/2017    Procedure: Miley Gotmarianne;  Surgeon: Cammy Truong MD;  Location: [facility] GI LAB;   Service:     CYSTOSCOPY      removal of kidney stones    ESOPHAGOGASTRODUODENOSCOPY N/A 3/10/2017    Procedure: ESOPHAGOGASTRODUODENOSCOPY (EGD); Surgeon: Quang Nielsen MD;  Location: [facility] GI LAB; Service:     LITHOTRIPSY      MD ESOPHAGOGASTRODUODENOSCOPY TRANSORAL DIAGNOSTIC N/A 11/20/2018    Procedure: ESOPHAGOGASTRODUODENOSCOPY (EGD); Surgeon: Quagn Nielsen MD;  Location: [facility] GI LAB; Service: Gastroenterology    TONSILLECTOMY      VEIN LIGATION AND STRIPPING Bilateral     17's     Family History   Problem Relation Age of Onset    Emphysema Mother         never smoked    Emphysema Father     Cancer Brother         colon    Colon cancer Brother     Ulcerative colitis Family     Liver disease Family      Immunization History   Administered Date(s) Administered    INFLUENZA 09/27/2016, 09/27/2018, 09/28/2019    Influenza TIV (IM) 09/27/2013, 10/23/2014    Pneumococcal Conjugate 13-Valent 04/26/2016    Pneumococcal Polysaccharide PPV23 04/23/2011    Tdap 04/26/2016    Zoster 10/29/2014, 04/27/2015, 07/24/2018    Zoster Vaccine Recombinant 05/24/2018, 07/24/2018     Current Outpatient Medications   Medication Sig Dispense Refill    amLODIPine (NORVASC) 2 5 mg tablet Take 1 tablet (2 5 mg total) by mouth daily 30 tablet 3    budesonide (PULMICORT) 0 5 mg/2 mL nebulizer solution Take 1 vial (0 5 mg total) by nebulization 2 (two) times a day for 5 days Rinse mouth after use   10 vial 0    busPIRone (BUSPAR) 10 mg tablet TAKE ONE TABLET BY MOUTH THREE TIMES DAILY  90 tablet 0    cholestyramine sugar free (QUESTRAN LIGHT) 4 g packet Take 1 packet (4 g total) by mouth 2 (two) times a day 60 packet 1    famotidine (PEPCID) 20 mg tablet Take 1 tablet (20 mg total) by mouth daily 30 tablet 3    fluticasone (FLONASE) 50 mcg/act nasal spray 2 sprays into each nostril daily 16 g 5    formoterol (PERFOROMIST) 20 MCG/2ML nebulizer solution Take 20 mcg by nebulization 2 (two) times a day      gabapentin (NEURONTIN) 300 mg capsule TAKE ONE CAPSULE BY MOUTH THREE TIMES DAILY  90 capsule 0    guaiFENesin (ROBITUSSIN) 100 MG/5ML oral liquid Take 200 mg by mouth 2 (two) times a day      LORazepam (ATIVAN) 1 mg tablet TAKE ONE TABLET BY MOUTH TWICE DAILY  60 tablet 0    melatonin 3 mg Take 1 tablet (3 mg total) by mouth daily at bedtime 30 tablet 1    midodrine (PROAMATINE) 2 5 mg tablet Take 1 tablet (2 5 mg total) by mouth 3 (three) times a day before meals 30 tablet 3    OXYGEN-HELIUM IN Inhale 2L at rest- 3L with activity      pantoprazole (PROTONIX) 40 mg tablet Take 1 tablet (40 mg total) by mouth 2 (two) times a day 180 tablet 1    tamsulosin (FLOMAX) 0 4 mg Take 0 4 mg by mouth daily      VENTOLIN  (90 Base) MCG/ACT inhaler Inhale 1 puff every 6 (six) hours as needed       ZEMAIRA infusion once a week       doxylamine ([employer] SleepTabs) 25 MG tablet Take 25 mg by mouth daily at bedtime as needed for sleep      finasteride (PROSCAR) 5 mg tablet Take 1 tablet (5 mg total) by mouth every morning 90 tablet 3    ipratropium-albuterol (DUO-NEB) 0 5-2 5 mg/3 mL nebulizer solution Take 1 vial (3 mL total) by nebulization 4 (four) times a day 120 vial 6    tamsulosin (FLOMAX) 0 4 mg Half a tablet daily (Patient not taking: Reported on 9/13/2020) 30 capsule 5     No current facility-administered medications for this visit  Allergies: Chantix [varenicline]    Objective:  Vitals:    09/24/20 1130   BP: 132/70   BP Location: Left arm   Patient Position: Sitting   Cuff Size: Large   Pulse: 90   Resp: 16   Temp: 97 8 °F (36 6 °C)   TempSrc: Temporal   SpO2: 97%   Weight: 86 2 kg (190 lb)   Height: 6' 4" (1 93 m)   Oxygen Therapy  SpO2: 97 %    Wt Readings from Last 3 Encounters:   09/24/20 86 2 kg (190 lb)   09/23/20 87 5 kg (193 lb)   09/23/20 87 8 kg (193 lb 9 6 oz)     Body mass index is 23 13 kg/m²  Physical Exam  Constitutional:       General: He is not in acute distress  Appearance: He is well-developed     HENT:      Head: Normocephalic and atraumatic  Mouth/Throat:      Pharynx: No oropharyngeal exudate  Eyes:      Pupils: Pupils are equal, round, and reactive to light  Neck:      Musculoskeletal: Normal range of motion and neck supple  Thyroid: No thyromegaly  Vascular: No JVD  Trachea: No tracheal deviation  Cardiovascular:      Heart sounds: No murmur  No friction rub  No gallop  Pulmonary:      Effort: Pulmonary effort is normal  No respiratory distress  Breath sounds: Normal breath sounds  No stridor  No wheezing or rales  Comments: 95 percent on 3 liters nasal cannula resting  Chest:      Chest wall: No tenderness  Abdominal:      General: There is no distension  Tenderness: There is no abdominal tenderness  There is no guarding  Musculoskeletal: Normal range of motion  Skin:     General: Skin is warm and dry  Findings: No erythema or rash  Neurological:      Mental Status: He is alert and oriented to person, place, and time           Lab Review:   Admission on 09/13/2020, Discharged on 09/16/2020   Component Date Value    Sodium 09/13/2020 138     Potassium 09/13/2020 4 1     Chloride 09/13/2020 104     CO2 09/13/2020 26     ANION GAP 09/13/2020 8     BUN 09/13/2020 23     Creatinine 09/13/2020 1 27     Glucose 09/13/2020 160*    Calcium 09/13/2020 8 6     AST 09/13/2020 56*    ALT 09/13/2020 121*    Alkaline Phosphatase 09/13/2020 73     Total Protein 09/13/2020 6 7     Albumin 09/13/2020 3 6     Total Bilirubin 09/13/2020 1 10*    eGFR 09/13/2020 55     WBC 09/13/2020 8 14     RBC 09/13/2020 4 32     Hemoglobin 09/13/2020 14 2     Hematocrit 09/13/2020 41 8     MCV 09/13/2020 97     MCH 09/13/2020 32 9     MCHC 09/13/2020 34 0     RDW 09/13/2020 13 0     MPV 09/13/2020 9 4     Platelets 75/01/3556 146*    nRBC 09/13/2020 0     Neutrophils Relative 09/13/2020 81*    Immat GRANS % 09/13/2020 1     Lymphocytes Relative 09/13/2020 11*    Monocytes Relative 09/13/2020 5     Eosinophils Relative 09/13/2020 1     Basophils Relative 09/13/2020 1     Protime 09/13/2020 13 4     INR 09/13/2020 1 03     PTT 09/13/2020 25     Troponin I 09/13/2020 <0 02     POC Glucose 09/13/2020 144*    Sodium 09/14/2020 139     Potassium 09/14/2020 3 9     Chloride 09/14/2020 105     CO2 09/14/2020 21     ANION GAP 09/14/2020 13     BUN 09/14/2020 17     Creatinine 09/14/2020 1 21     Glucose 09/14/2020 109     Calcium 09/14/2020 8 2*    eGFR 09/14/2020 58     Magnesium 09/14/2020 2 1     Phosphorus 09/14/2020 3 0     WBC 09/14/2020 8 71     RBC 09/14/2020 4 28     Hemoglobin 09/14/2020 13 9     Hematocrit 09/14/2020 42 6     MCV 09/14/2020 100*    MCH 09/14/2020 32 5     MCHC 09/14/2020 32 6     RDW 09/14/2020 12 8     MPV 09/14/2020 10 2     Platelets 40/47/7380 114*    nRBC 09/14/2020 0     Neutrophils Relative 09/14/2020 62     Immat GRANS % 09/14/2020 1     Lymphocytes Relative 09/14/2020 27     Monocytes Relative 09/14/2020 6     Eosinophils Relative 09/14/2020 3     Basophils Relative 09/14/2020 1     Neutrophils Absolute 09/14/2020 5 44     Immature Grans Absolute 09/14/2020 0 06     Lymphocytes Absolute 09/14/2020 2 37     Monocytes Absolute 09/14/2020 0 52     Eosinophils Absolute 09/14/2020 0 26     Basophils Absolute 09/14/2020 0 06     WBC 09/15/2020 8 75     RBC 09/15/2020 4 09     Hemoglobin 09/15/2020 13 0     Hematocrit 09/15/2020 39 5     MCV 09/15/2020 97     MCH 09/15/2020 31 8     MCHC 09/15/2020 32 9     RDW 09/15/2020 13 0     Platelets 68/03/7008 118*    MPV 09/15/2020 9 4     Sodium 09/15/2020 140     Potassium 09/15/2020 3 8     Chloride 09/15/2020 105     CO2 09/15/2020 26     ANION GAP 09/15/2020 9     BUN 09/15/2020 14     Creatinine 09/15/2020 1 28     Glucose 09/15/2020 112     Calcium 09/15/2020 8 6     eGFR 09/15/2020 54     Magnesium 09/15/2020 2 0     Phosphorus 09/15/2020 3 1     WBC 09/16/2020 8 65     RBC 09/16/2020 4 10     Hemoglobin 09/16/2020 13 4     Hematocrit 09/16/2020 39 8     MCV 09/16/2020 97     MCH 09/16/2020 32 7     MCHC 09/16/2020 33 7     RDW 09/16/2020 13 1     MPV 09/16/2020 9 0     Platelets 61/36/1007 133*    nRBC 09/16/2020 0     Neutrophils Relative 09/16/2020 53     Immat GRANS % 09/16/2020 1     Lymphocytes Relative 09/16/2020 33     Monocytes Relative 09/16/2020 7     Eosinophils Relative 09/16/2020 5     Basophils Relative 09/16/2020 1     Neutrophils Absolute 09/16/2020 4 70     Immature Grans Absolute 09/16/2020 0 08     Lymphocytes Absolute 09/16/2020 2 86     Monocytes Absolute 09/16/2020 0 56     Eosinophils Absolute 09/16/2020 0 40     Basophils Absolute 09/16/2020 0 05     Sodium 09/16/2020 140     Potassium 09/16/2020 3 8     Chloride 09/16/2020 105     CO2 09/16/2020 26     ANION GAP 09/16/2020 9     BUN 09/16/2020 15     Creatinine 09/16/2020 1 36*    Glucose 09/16/2020 105     Calcium 09/16/2020 8 6     eGFR 09/16/2020 50     Magnesium 09/16/2020 1 9     Phosphorus 09/16/2020 3 4     Ventricular Rate 09/13/2020 85     Atrial Rate 09/13/2020 85     DE Interval 09/13/2020 136     QRSD Interval 09/13/2020 98     QT Interval 09/13/2020 360     QTC Interval 09/13/2020 428     P Axis 09/13/2020 -19     QRS Axis 09/13/2020 36     T Wave [place] 09/13/2020 10    Appointment on 09/04/2020   Component Date Value    TSH 3RD GENERATON 09/04/2020 1  148     Vit D, 25-Hydroxy 09/04/2020 15 9*   Appointment on 09/04/2020   Component Date Value    Pancreatic Elastase-1 09/04/2020 >500    Admission on 08/26/2020, Discharged on 08/26/2020   Component Date Value    Protime 08/26/2020 13 5     INR 08/26/2020 1 04     PTT 08/26/2020 27     WBC 08/26/2020 11 10*    RBC 08/26/2020 4 37     Hemoglobin 08/26/2020 14 2     Hematocrit 08/26/2020 44 1     MCV 08/26/2020 101*    MCH 08/26/2020 32 5     [employer] 08/26/2020 32 2     RDW 08/26/2020 12 9     MPV 08/26/2020 9 1     Platelets 34/12/1982 182     nRBC 08/26/2020 0     Neutrophils Relative 08/26/2020 65     Immat GRANS % 08/26/2020 1     Lymphocytes Relative 08/26/2020 21     Monocytes Relative 08/26/2020 7     Eosinophils Relative 08/26/2020 5     Basophils Relative 08/26/2020 1     Neutrophils Absolute 08/26/2020 7 42     Immature Grans Absolute 08/26/2020 0 06     Lymphocytes Absolute 08/26/2020 2 30     Monocytes Absolute 08/26/2020 0 72     Eosinophils Absolute 08/26/2020 0 55     Basophils Absolute 08/26/2020 0 05     Sodium 08/26/2020 133*    Potassium 08/26/2020 4 0     Chloride 08/26/2020 100     CO2 08/26/2020 28     ANION GAP 08/26/2020 5     BUN 08/26/2020 13     Creatinine 08/26/2020 1 09     Glucose 08/26/2020 104     Calcium 08/26/2020 8 9     AST 08/26/2020 30     ALT 08/26/2020 55     Alkaline Phosphatase 08/26/2020 77     Total Protein 08/26/2020 6 5     Albumin 08/26/2020 3 4*    Total Bilirubin 08/26/2020 1 20*    eGFR 08/26/2020 66     Troponin I 08/26/2020 <0 02     NT-proBNP 08/26/2020 217     Ventricular Rate 08/26/2020 74     Atrial Rate 08/26/2020 74     IL Interval 08/26/2020 142     QRSD Interval 08/26/2020 98     QT Interval 08/26/2020 398     QTC Interval 08/26/2020 441     P Axis 08/26/2020 -14     QRS Axis 08/26/2020 37     T Wave [place] 08/26/2020 36    Admission on 08/19/2020, Discharged on 08/19/2020   Component Date Value    WBC 08/19/2020 8 55     RBC 08/19/2020 4 04     Hemoglobin 08/19/2020 13 1     Hematocrit 08/19/2020 39 0     MCV 08/19/2020 97     MCH 08/19/2020 32 4     MCHC 08/19/2020 33 6     RDW 08/19/2020 13 1     MPV 08/19/2020 8 6*    Platelets 24/61/2293 229     Neutrophils Relative 08/19/2020 70     Lymphocytes Relative 08/19/2020 19     Monocytes Relative 08/19/2020 7     Eosinophils Relative 08/19/2020 3     Basophils Relative 08/19/2020 1     Neutrophils Absolute 2020 6 05     Lymphocytes Absolute 2020 1 58     Monocytes Absolute 2020 0 58     Eosinophils Absolute 2020 0 28     Basophils Absolute 2020 0 06     Protime 2020 13 9     INR 2020 1 08     PTT 2020 29     Sodium 2020 138     Potassium 2020 3 9     Chloride 2020 103     CO2 2020 28     ANION GAP 2020 7     BUN 2020 12     Creatinine 2020 1 30     Glucose 2020 115     Calcium 2020 8 9     AST 2020 15     ALT 2020 24     Alkaline Phosphatase 2020 73     Total Protein 2020 6 9     Albumin 2020 3 6     Total Bilirubin 2020 0 90     eGFR 2020 53     Lipase 2020 132     Troponin I 2020 <0 02     Magnesium 2020 2 0     NT-proBNP 2020 257     Ventricular Rate 2020 77     Atrial Rate 2020 77     SD Interval 2020 162     QRSD Interval 2020 100     QT Interval 2020 404     QTC Interval 2020 457     P Axis 2020 1     QRS Axis 2020 39     T Wave Axis 2020 30        Diagnostics:  No orders to display       PFT/MICHELLE:    Pulmonary Function Test Report  Mila Baumgarten 75 y o  male MRN: [id number]     Date of Testin2020     Date of Interpretation: 2020     Requesting Physician: Teresa Little PA-C     Reason for Testing: SOB     Reference set for interpretation: NHANES III     Procedure:  Testing was completed at the bedside  The patient demonstrated good effort and cooperation  The results of this test meet ATS standards for acceptability and repeatability    Data set appears appropriate for interpretation      Post bronchodilator testing performed after the administration of 2 5mg albuterol in 3cc normal saline administered via nebulizer per bronchodilator protocol      Results:  FEV1/FVC Ratio: 45 %  Forced Vital Capacity: 3 56 L    65 % predicted  FEV1: 1 59 L 40 % predicted     Interpretation:     · Moderate obstructive airflow defect      · There is significant airway response with the administration of bronchodilator per ATS standards     · Flow volume loop is consistent with obstruction      LEANDER Bowen  [facility] Pulmonary [facility] Critical [facility]        ESS:    CT PULMONARY ANGIOGRAM OF THE CHEST AND CT ABDOMEN AND PELVIS WITH INTRAVENOUS CONTRAST     INDICATION:  Shortness of breath, left calf pain, abdominal pain and diarrhea, back pain      COMPARISON:  CT chest 5/5/2020, CT abdomen and pelvis 3/8/2020     TECHNIQUE:  CT examination of the chest, abdomen and pelvis was performed  Thin section CT angiographic technique was used in the chest in order to evaluate for pulmonary embolus and coronal 3D MIP postprocessing was performed on the acquisition   scanner  Axial, sagittal, and coronal 2D reformatted images were created from the source data and submitted for interpretation      Radiation dose length product (DLP) for this visit:  929 96 mGy-cm   This examination, like all CT scans performed in the [facility], was performed utilizing techniques to minimize radiation dose exposure, including the use of iterative   reconstruction and automated exposure control      IV Contrast:  100 mL of iohexol (OMNIPAQUE)  Enteric Contrast:  Enteric contrast was not administered      FINDINGS:     CHEST     PULMONARY ARTERIAL TREE:  No acute pulmonary embolus is seen  Unchanged appearance of chronic left lower lobe pulmonary embolus      LUNGS:    Numerous small nodules are seen in the left upper lobe, new from the previous study  This is likely infectious or inflammatory  Attention on follow-up recommended      Spiculated 1 cm left lower lobe nodule is not as well-visualized on the current study, possibly secondary to respiratory motion  No significant enlargement within limitations      Background emphysema    Subsegmental atelectasis and/or scarring in the right lower lobe with a 3 4 cm subpleural bulla  Left apical fibrocalcific scarring      No consolidation or endobronchial lesions      PLEURA:  No pleural effusion  Posterior right pleural calcifications again noted      HEART/AORTA:  Unremarkable for patient's age      MEDIASTINUM AND MIKE:  Unremarkable      CHEST WALL AND LOWER NECK:   Unremarkable      ABDOMEN     LIVER/BILIARY TREE:  Liver is diffusely decreased in density consistent with fatty change  No CT evidence of suspicious hepatic mass  Normal hepatic contours  No biliary dilatation      GALLBLADDER:  No calcified gallstones  No pericholecystic inflammatory change      SPLEEN:  Unremarkable      PANCREAS:  Moderate fatty replacement without acute findings      ADRENAL GLANDS:  Unremarkable      KIDNEYS/URETERS:  Several left renal cysts again noted  2-3 mm nonobstructing right lower pole calculus  No hydronephrosis      STOMACH AND BOWEL:    The stomach is poorly distended, evaluation limited  Small bowel is normal caliber  Colonic diverticulosis without evidence of diverticulitis      APPENDIX:  No findings to suggest appendicitis      ABDOMINOPELVIC CAVITY:  No ascites  No pneumoperitoneum  No lymphadenopathy      VESSELS: Atherosclerotic changes abdominal aorta without evidence of aneurysm       PELVIS     REPRODUCTIVE ORGANS:  Prostate is borderline enlarged      URINARY BLADDER:  Unremarkable      ABDOMINAL WALL/INGUINAL REGIONS:  Unremarkable      OSSEOUS STRUCTURES:  No acute fracture or destructive osseous lesion  Multiple degenerative changes of the spine      Sclerosis in the bilateral femoral heads, right greater than left, suggestive of avascular necrosis  No evidence of articular surface collapse      IMPRESSION:     1  No acute pulmonary emboli  Stable appearance of chronic left lower lobe pulmonary embolus      2    New micronodules in the left upper lobe suggesting infectious or inflammatory bronchiolitis      3  COPD  1 cm spiculated left lower lobe nodule seen on previous study is not as well visualized currently, possibly related to respiratory motion  No significant enlargement within limitations  Continued CT surveillance in 6 months advised      4  No acute intra-abdominal abnormality      Numerous incidental findings as detailed above  Xr Chest 1 View Portable    Result Date: 8/26/2020  Narrative: CHEST INDICATION:   SOB  COMPARISON:  Chest radiograph from 5/17/2020 and chest CT from 8/19/2020  EXAM PERFORMED/VIEWS:  XR CHEST PORTABLE FINDINGS: Cardiomediastinal silhouette appears unremarkable  No new abnormalities  Increased interstitial markings in the upper lungs due to emphysema with tiny nodular opacities in the upper lungs corresponding with inflammatory nodules on CT  No effusion or pneumothorax  Osseous structures appear within normal limits for patient age  Impression: No significant change since the chest CT from 8/19/2020 with tiny inflammatory nodules in the upper lobes  Emphysema  Workstation performed: [id number]     Xr Chest Pa & Lateral    Result Date: 9/23/2020  Narrative: CHEST INDICATION:   R05: Cough  COMPARISON:  Chest radiograph from 8/26/2020  Chest CT from 8/19/2020  CTA of the neck from 9/13/2020 which includes the upper chest  EXAM PERFORMED/VIEWS:  XR CHEST PA & LATERAL  DUAL ENERGY SUBTRACTION TECHNIQUE FINDINGS: Cardiomediastinal silhouette appears unremarkable  Hyperinflation due to emphysema  No acute disease  No effusion or pneumothorax  Osseous structures appear within normal limits for patient age  Impression: No acute cardiopulmonary disease  Emphysema   Workstation performed: [id number]       EKG/ECHO:    [address]  [address] [facility] [address]  [phone]     Transthoracic Echocardiogram  2D, M-mode, Doppler, and Color Doppler     Study date:  01-Jul-2019     Patient: [facility]  MR number: [id number]  Account number: [de-identified]  : 1945  Age: 75 years  Gender: Male  Status: Inpatient  Location: Bedside  Height: 77 in  Weight: 186 6 lb  BP: 124/ 72 mmHg     Indications: Dyspnea     Diagnoses: R06 00 - Dyspnea, unspecified     Sonographer:  JOSE Valdez  Primary Physician:  Landon Ordonez MD  Referring Physician:  Dominique Colby PA-C  Group:  [facility]  Interpreting Physician:  Rosa Lang DO     SUMMARY     LEFT VENTRICLE:  Systolic function was normal by visual assessment  Ejection fraction was estimated to be 60 %  There were no regional wall motion abnormalities  There was moderate concentric hypertrophy  Doppler parameters were consistent with abnormal left ventricular relaxation (grade 1 diastolic dysfunction)      RIGHT VENTRICLE:  The ventricle was mildly dilated      RIGHT ATRIUM:  The atrium was mildly dilated      MITRAL VALVE:  There was mild regurgitation      AORTIC VALVE:  There was no evidence for stenosis  There was no regurgitation      TRICUSPID VALVE:  There was mild regurgitation  Pulmonary artery systolic pressure was mildly to moderately increased      PERICARDIUM:  A small pericardial effusion was identified      HISTORY: PRIOR HISTORY: HTN, COPD, Emphysema, IBS, Skin Cancer, BPH     PROCEDURE: The procedure was performed at the bedside  This was a routine study  The transthoracic approach was used  The study included complete 2D imaging, M-mode, complete spectral Doppler, and color Doppler  The heart rate was 73 bpm,  at the start of the study  Image quality was adequate      LEFT VENTRICLE: Size was normal  Systolic function was normal by visual assessment  Ejection fraction was estimated to be 60 %  There were no regional wall motion abnormalities  There was moderate concentric hypertrophy   DOPPLER: Doppler  parameters were consistent with abnormal left ventricular relaxation (grade 1 diastolic dysfunction)      RIGHT VENTRICLE: The ventricle was mildly dilated  Systolic function was normal      LEFT ATRIUM: The atrium was mildly dilated  No thrombus was identified      RIGHT ATRIUM: The atrium was mildly dilated      MITRAL VALVE: There was annular calcification  Valve structure was normal  There was normal leaflet separation  No echocardiographic evidence for prolapse  DOPPLER: The transmitral velocity was within the normal range  There was no  evidence for stenosis  There was mild regurgitation      AORTIC VALVE: The valve was trileaflet  Leaflets exhibited normal thickness, calcification, and normal cuspal separation  DOPPLER: Transaortic velocity was within the normal range  There was no evidence for stenosis  There was no  regurgitation      TRICUSPID VALVE: The valve structure was normal  There was normal leaflet separation  DOPPLER: The transtricuspid velocity was within the normal range  There was mild regurgitation  Pulmonary artery systolic pressure was mildly to  moderately increased  Estimated peak PA pressure was 51 mmHg      PULMONIC VALVE: Leaflets exhibited normal thickness, no calcification, and normal cuspal separation  DOPPLER: The transpulmonic velocity was within the normal range  There was no regurgitation      PERICARDIUM: There was no thickening   A small pericardial effusion was identified      AORTA: The root exhibited normal size and fibrocalcific change      PULMONARY ARTERY: The size was normal  The morphology appeared normal     [facility] [address] [place], [address] [phone]     Rest/Stress Gated SPECT Myocardial Perfusion Imaging After Regadenoson     Patient: [address] [place] [address]  MR number: [id number]  Account number: [de-identified]  : 1945  Age: 75 years  Gender: Male  Status: Inpatient  Location: Stress lab  Height: 77 in  Weight: 192 lb  BP: 172/ 80 mmHg     Allergies: VARENICLINE     Diagnosis: I21 4 - Non-ST elevation (NSTEMI) myocardial infarction, R07 9 - Chest pain, unspecified     Primary Physician:  Austin Fortune MD  RN:  ANA Mike  Group:  [facility]  Report Prepared By[de-identified]  ANA Mike  Interpreting Physician:  Darnell Summers MD     INDICATIONS: Evaluation for coronary artery disease      HISTORY: The patient is a 75year old  male  Chest pain status: chest pain  Other symptoms: dyspnea  Coronary artery disease risk factors: hypertension  Cardiovascular history: none significant  Co-morbidity: history of lung  disease  Medications: a calcium channel blocker      PHYSICAL EXAM: Baseline physical exam screening: scant wheezing audible      REST ECG: Normal sinus rhythm      PROCEDURE: The study was performed in the the Stress lab  A regadenoson infusion pharmacologic stress test was performed  Gated SPECT myocardial perfusion imaging was performed after stress and at rest  Systolic blood pressure was 172  mmHg, at the start of the study  Diastolic blood pressure was 80 mmHg, at the start of the study  The heart rate was 92 bpm, at the start of the study  IV double checked      DOBUTAMINE PROTOCOL:  Time HR bpm SBP mmHg DBP mmHg Symptoms Rhythm/conduct  Baseline 13:31 93 172 80 none NSR  10 mcg/kg/min 13:34 92 180 86 none same as above  15 mcg/kg/min 13:37 100 195 89 none --  20 mcg/kg/min 13:38 107 183 69 chest discomfort, dizziness --  Immediate 13:43 134 187 81 same as above --  Recovery I 13:46 114 178 74 subsiding --  Recovery II 13:48 105 173 79 subsiding --  Recovery III 13:50 97 174 79 none --  No medications or fluids given      STRESS SUMMARY: Duration of pharmacologic stress was 10 min and 5 sec   total dose given= 13 4 mg Maximal heart rate during stress was 139 bpm ( 96 % of maximal predicted heart rate)  The heart rate response to stress was normal  There was resting hypertension with a hypertensive blood pressure response to  stress  The rate-pressure product for the peak heart rate and blood pressure was 23971   The patient experienced chest pain during stress; pain resolved spontaneously  The stress test was terminated due to protocol completion and  achievement of target heart rate  Pre oxygen saturation: 97 %  The stress ECG was equivocal for ischemia  Arrhythmia during stress: isolated premature ventricular beats      ISOTOPE ADMINISTRATION:  Resting isotope administration Stress isotope administration  Agent Tetrofosmin Tetrofosmin  Dose 11 mCi 32 4 mCi  Date 11/11/2019 11/11/2019     Stress isotope administration  Agent Tetrofosmin  Dose 11 mCi  Date 11/11/2019     The radiopharmaceutical was injected at the peak effect of pharmacologic stress      MYOCARDIAL PERFUSION IMAGING:  The image quality was fair  Rotating projection images reveal moderate diaphragmatic attenuation and moderate patient motion  Left ventricular size was normal  The TID ratio was 1 1  The right ventricle was dilated      PERFUSION DEFECTS:  -  There was a moderate-sized, moderately severe, fixed myocardial perfusion defect of the entire inferior wall likely due to diaphragm attenuation as it normalizes in prone images      GATED SPECT:  The calculated left ventricular ejection fraction was 75 %  Left ventricular ejection fraction was within normal limits by visual estimate  There was no left ventricular regional abnormality      SUMMARY:  -  Stress results: Target heart rate was achieved  There was resting hypertension with a hypertensive blood pressure response to stress  The patient experienced chest pain during stress; pain resolved spontaneously  -  ECG conclusions: The stress ECG was equivocal for ischemia  -  Perfusion imaging: There was a moderate-sized, moderately severe, fixed myocardial perfusion defect of the entire inferior wall likely due to diaphragm attenuation as it normalizes in prone images  -  Gated SPECT: The calculated left ventricular ejection fraction was 75 %   Left ventricular ejection fraction was within normal limits by visual estimate  There was no left ventricular regional abnormality   -  Impressions and recommendations: Probably normal study after pharmacologic stress without reproduction of symptoms      IMPRESSIONS: Probably normal study after pharmacologic stress without reproduction of symptoms  There was image artifact, without diagnostic evidence for perfusion abnormality  Left ventricular systolic function was normal      Prepared and signed by  Terese Silveira MD  Signed 11/12/2019 11:00:30      Portions of the record may have been created with voice recognition software  Occasional wrong word or "sound a like" substitutions may have occurred due to the inherent limitations of voice recognition software  Read the chart carefully and recognize, using context, where substitutions have occurred      Electronically Signed by Ronnie Kate PA-C

## 2020-09-24 NOTE — PATIENT INSTRUCTIONS
COPD w/ Alpha 1 Antitrypsin Deficiency:  -referral to palliative care  -continue with visiting nurses  -continue perforomist / Fabrice Gilbert / Uday Kang  -please notify once you are finished with VNA and we will set you up on PALS program     Chronic Hypoxemic Respiratory Failure:  -continue 3 L NC  -titrate for 02 sat 89-93%  -referral to palliative care    Lung Nodule:  -follow up CT imaging February 2020    Follow up in 6 months

## 2020-09-28 ENCOUNTER — TELEPHONE (OUTPATIENT)
Dept: FAMILY MEDICINE CLINIC | Facility: CLINIC | Age: 75
End: 2020-09-28

## 2020-09-28 NOTE — TELEPHONE ENCOUNTER
FUENTES Schmitz is Calling from [facility], and would like to report that Patient is not able to sleep after starting Melatonin 3 mg on 9/23/2020  Patient is  waking up with elevated Blood pressure as high as 170/100, it does fluctuate throughout the day going down as well, She would also like to report that Patient still remains dizzy with ambulation  Ainsley can be reached at the number listed below with any questions   Patient does have AWV scheduled for 9/29/2020 @ 9 am          Ainsley [phone]

## 2020-09-29 ENCOUNTER — PATIENT OUTREACH (OUTPATIENT)
Dept: FAMILY MEDICINE CLINIC | Facility: CLINIC | Age: 75
End: 2020-09-29

## 2020-09-29 ENCOUNTER — OFFICE VISIT (OUTPATIENT)
Dept: FAMILY MEDICINE CLINIC | Facility: CLINIC | Age: 75
End: 2020-09-29
Payer: MEDICARE

## 2020-09-29 VITALS
HEIGHT: 77 IN | DIASTOLIC BLOOD PRESSURE: 76 MMHG | OXYGEN SATURATION: 98 % | SYSTOLIC BLOOD PRESSURE: 134 MMHG | RESPIRATION RATE: 18 BRPM | HEART RATE: 85 BPM | WEIGHT: 193 LBS | BODY MASS INDEX: 22.79 KG/M2 | TEMPERATURE: 98.2 F

## 2020-09-29 DIAGNOSIS — I95.1 ORTHOSTATIC HYPOTENSION: ICD-10-CM

## 2020-09-29 DIAGNOSIS — G47.09 OTHER INSOMNIA: ICD-10-CM

## 2020-09-29 DIAGNOSIS — E88.01 AAT (ALPHA-1-ANTITRYPSIN) DEFICIENCY (HCC): ICD-10-CM

## 2020-09-29 DIAGNOSIS — Z00.00 MEDICARE ANNUAL WELLNESS VISIT, SUBSEQUENT: Primary | ICD-10-CM

## 2020-09-29 PROCEDURE — G0438 PPPS, INITIAL VISIT: HCPCS | Performed by: FAMILY MEDICINE

## 2020-09-29 RX ORDER — TRAZODONE HYDROCHLORIDE 50 MG/1
50 TABLET ORAL
Qty: 30 TABLET | Refills: 3 | Status: SHIPPED | OUTPATIENT
Start: 2020-09-29 | End: 2020-10-20

## 2020-09-29 NOTE — PATIENT INSTRUCTIONS
Continue current medications follow-up pulmonary cardiology  Hydrate well as instructed labs as ordered routine health maintenance and screening    Vaccination update    Debrox 3 drops each ear 3 times weekly

## 2020-09-29 NOTE — PROGRESS NOTES
Sundar is here for his Subsequent Wellness visit  Health Risk Assessment:   Patient rates overall health as poor  Patient feels that their physical health rating is slightly worse  Eyesight was rated as much worse  Hearing was rated as slightly worse  Patient feels that their emotional and mental health rating is much worse  Pain experienced in the last 7 days has been some  Patient's pain rating has been 2/10  Patient states that he has experienced weight loss or gain in last 6 months  Depression Screening:   PHQ-2 Score: 4  PHQ-9 Score: 17      Fall Risk Screening: In the past year, patient has experienced: history of falling in past year    Number of falls: 2 or more  Injured during fall?: Yes    Feels unsteady when standing or walking?: Yes    Worried about falling?: Yes      Home Safety:  Patient has working smoke alarms and has working carbon monoxide detector  Home safety hazards include: none  Nutrition:   Current diet is Regular  TV dinners    Medications:   Patient is currently taking over-the-counter supplements  OTC medications include: see medication list  Patient is able to manage medications  Activities of Daily Living (ADLs)/Instrumental Activities of Daily Living (IADLs):   Walk and transfer into and out of bed and chair?: Yes  Dress and groom yourself?: Yes    Bathe or shower yourself?: Yes    Feed yourself?  Yes  Do your laundry/housekeeping?: Yes  Manage your money, pay your bills and track your expenses?: Yes  Make your own meals?: Yes    Do your own shopping?: No    ADL comments: Bathing is getting to be hard    Previous Hospitalizations:   Any hospitalizations or ED visits within the last 12 months?: Yes    How many hospitalizations have you had in the last year?: more than 4    PREVENTIVE SCREENINGS      Cardiovascular Screening:    General: Screening Current      Diabetes Screening:     General: Screening Current      Prostate Cancer Screening:    General: Screening Not Indicated      Abdominal Aortic Aneurysm (AAA) Screening:    Risk factors include: tobacco use        Lung Cancer Screening:     General: Screening Not Indicated and History Lung Cancer    Other Counseling Topics:   Calcium and vitamin D intake and regular weightbearing exercise

## 2020-09-29 NOTE — PROGRESS NOTES
Met with patient during PCP visit  Reviewed ED visits with patient and Dr Tova Vaca  Pt has complex healthcare needs  Discussed current living situation and ability to live independently  Pt states he is managing  Daughter grocery shops for him  He consumes mostly microwaved foods  Advised that these are high in sodium content but it what works for him at this time  Discussed moving to a place that can provide more supervision to assist him in meeting his healthcare needs  Pt is not ready to leave his home and [place] of friends  CM will continue to assist patient as needed  In basket message sent to [facility] for care coordination

## 2020-09-30 DIAGNOSIS — J96.11 CHRONIC RESPIRATORY FAILURE WITH HYPOXIA (HCC): ICD-10-CM

## 2020-09-30 RX ORDER — BUSPIRONE HYDROCHLORIDE 10 MG/1
10 TABLET ORAL 3 TIMES DAILY
Qty: 90 TABLET | Refills: 1 | Status: SHIPPED | OUTPATIENT
Start: 2020-09-30 | End: 2020-11-25

## 2020-10-02 ENCOUNTER — TELEPHONE (OUTPATIENT)
Dept: FAMILY MEDICINE CLINIC | Facility: CLINIC | Age: 75
End: 2020-10-02

## 2020-10-11 DIAGNOSIS — G62.9 NEUROPATHY: ICD-10-CM

## 2020-10-14 ENCOUNTER — TELEPHONE (OUTPATIENT)
Dept: FAMILY MEDICINE CLINIC | Facility: CLINIC | Age: 75
End: 2020-10-14

## 2020-10-15 DIAGNOSIS — G62.9 NEUROPATHY: ICD-10-CM

## 2020-10-15 DIAGNOSIS — F41.9 ANXIETY: ICD-10-CM

## 2020-10-15 RX ORDER — GABAPENTIN 300 MG/1
300 CAPSULE ORAL 3 TIMES DAILY
Qty: 90 CAPSULE | Refills: 6 | Status: SHIPPED | OUTPATIENT
Start: 2020-10-15 | End: 2021-07-08

## 2020-10-16 RX ORDER — GABAPENTIN 300 MG/1
CAPSULE ORAL
Qty: 90 CAPSULE | Refills: 0 | OUTPATIENT
Start: 2020-10-16

## 2020-10-19 RX ORDER — LORAZEPAM 1 MG/1
TABLET ORAL
Qty: 60 TABLET | Refills: 0 | Status: SHIPPED | OUTPATIENT
Start: 2020-10-19 | End: 2020-11-16

## 2020-10-20 ENCOUNTER — CONSULT (OUTPATIENT)
Dept: PALLIATIVE MEDICINE | Facility: CLINIC | Age: 75
End: 2020-10-20
Payer: MEDICARE

## 2020-10-20 VITALS
DIASTOLIC BLOOD PRESSURE: 71 MMHG | RESPIRATION RATE: 20 BRPM | WEIGHT: 191.4 LBS | HEART RATE: 86 BPM | BODY MASS INDEX: 22.7 KG/M2 | SYSTOLIC BLOOD PRESSURE: 132 MMHG | TEMPERATURE: 97.1 F

## 2020-10-20 DIAGNOSIS — E88.01 AAT (ALPHA-1-ANTITRYPSIN) DEFICIENCY (HCC): ICD-10-CM

## 2020-10-20 DIAGNOSIS — N40.1 BENIGN PROSTATIC HYPERPLASIA WITH URINARY OBSTRUCTION: Chronic | ICD-10-CM

## 2020-10-20 DIAGNOSIS — R06.02 SHORTNESS OF BREATH: ICD-10-CM

## 2020-10-20 DIAGNOSIS — I27.82 OTHER CHRONIC PULMONARY EMBOLISM WITHOUT ACUTE COR PULMONALE (HCC): ICD-10-CM

## 2020-10-20 DIAGNOSIS — Z51.5 PALLIATIVE CARE PATIENT: ICD-10-CM

## 2020-10-20 DIAGNOSIS — J44.9 CHRONIC OBSTRUCTIVE PULMONARY DISEASE, UNSPECIFIED COPD TYPE (HCC): Primary | ICD-10-CM

## 2020-10-20 DIAGNOSIS — Z71.89 GOALS OF CARE, COUNSELING/DISCUSSION: ICD-10-CM

## 2020-10-20 DIAGNOSIS — Z87.891 FORMER SMOKER: ICD-10-CM

## 2020-10-20 DIAGNOSIS — I87.2 CHRONIC VENOUS INSUFFICIENCY: ICD-10-CM

## 2020-10-20 DIAGNOSIS — K21.9 GASTROESOPHAGEAL REFLUX DISEASE, UNSPECIFIED WHETHER ESOPHAGITIS PRESENT: ICD-10-CM

## 2020-10-20 DIAGNOSIS — I27.20 PULMONARY HYPERTENSION (HCC): ICD-10-CM

## 2020-10-20 DIAGNOSIS — G47.09 OTHER INSOMNIA: ICD-10-CM

## 2020-10-20 DIAGNOSIS — I10 ESSENTIAL HYPERTENSION: ICD-10-CM

## 2020-10-20 DIAGNOSIS — N13.8 BENIGN PROSTATIC HYPERPLASIA WITH URINARY OBSTRUCTION: Chronic | ICD-10-CM

## 2020-10-20 DIAGNOSIS — G60.9 IDIOPATHIC PERIPHERAL NEUROPATHY: ICD-10-CM

## 2020-10-20 DIAGNOSIS — J43.2 CENTRILOBULAR EMPHYSEMA (HCC): Primary | ICD-10-CM

## 2020-10-20 DIAGNOSIS — J96.11 CHRONIC RESPIRATORY FAILURE WITH HYPOXIA (HCC): ICD-10-CM

## 2020-10-20 PROCEDURE — 99215 OFFICE O/P EST HI 40 MIN: CPT | Performed by: INTERNAL MEDICINE

## 2020-10-20 RX ORDER — TRAZODONE HYDROCHLORIDE 100 MG/1
100 TABLET ORAL
Qty: 30 TABLET | Refills: 0 | Status: SHIPPED | OUTPATIENT
Start: 2020-10-20 | End: 2020-11-10 | Stop reason: SDUPTHER

## 2020-10-20 RX ORDER — AMITRIPTYLINE HYDROCHLORIDE 10 MG/1
10 TABLET, FILM COATED ORAL
Qty: 30 TABLET | Refills: 0 | Status: SHIPPED | OUTPATIENT
Start: 2020-10-20 | End: 2020-10-27 | Stop reason: SINTOL

## 2020-10-23 DIAGNOSIS — I10 ORTHOSTATIC HYPERTENSION: ICD-10-CM

## 2020-10-23 RX ORDER — MIDODRINE HYDROCHLORIDE 2.5 MG/1
TABLET ORAL
Qty: 30 TABLET | Refills: 0 | Status: SHIPPED | OUTPATIENT
Start: 2020-10-23 | End: 2020-11-02

## 2020-10-27 ENCOUNTER — TELEPHONE (OUTPATIENT)
Dept: PALLIATIVE MEDICINE | Facility: CLINIC | Age: 75
End: 2020-10-27

## 2020-10-27 DIAGNOSIS — Z51.5 PALLIATIVE CARE PATIENT: ICD-10-CM

## 2020-10-27 DIAGNOSIS — G47.00 INSOMNIA, UNSPECIFIED TYPE: Primary | ICD-10-CM

## 2020-10-27 RX ORDER — OLANZAPINE 5 MG/1
5 TABLET ORAL
Qty: 30 TABLET | Refills: 0 | Status: SHIPPED | OUTPATIENT
Start: 2020-10-27 | End: 2020-11-10 | Stop reason: SDUPTHER

## 2020-10-28 NOTE — SOCIAL WORK
SW faxed order form and clinicals to [facility] for a Life 2000 ventilator for patient for discharge  SW will follow  Class II - visualization of the soft palate, fauces, and uvula

## 2020-11-02 ENCOUNTER — TELEMEDICINE (OUTPATIENT)
Dept: FAMILY MEDICINE CLINIC | Facility: CLINIC | Age: 75
End: 2020-11-02
Payer: MEDICARE

## 2020-11-02 DIAGNOSIS — J43.2 CENTRILOBULAR EMPHYSEMA (HCC): ICD-10-CM

## 2020-11-02 DIAGNOSIS — I10 ESSENTIAL HYPERTENSION: ICD-10-CM

## 2020-11-02 DIAGNOSIS — K11.20 SIALADENITIS: Primary | ICD-10-CM

## 2020-11-02 DIAGNOSIS — K21.9 GASTROESOPHAGEAL REFLUX DISEASE WITHOUT ESOPHAGITIS: ICD-10-CM

## 2020-11-02 DIAGNOSIS — R60.0 BILATERAL LEG EDEMA: ICD-10-CM

## 2020-11-02 DIAGNOSIS — I10 ORTHOSTATIC HYPERTENSION: ICD-10-CM

## 2020-11-02 PROCEDURE — 99442 PR PHYS/QHP TELEPHONE EVALUATION 11-20 MIN: CPT | Performed by: FAMILY MEDICINE

## 2020-11-02 RX ORDER — MIDODRINE HYDROCHLORIDE 2.5 MG/1
TABLET ORAL
Qty: 30 TABLET | Refills: 0 | Status: SHIPPED | OUTPATIENT
Start: 2020-11-02 | End: 2020-11-10 | Stop reason: SDUPTHER

## 2020-11-02 RX ORDER — MIDODRINE HYDROCHLORIDE 2.5 MG/1
TABLET ORAL
Qty: 30 TABLET | Refills: 0 | Status: SHIPPED | OUTPATIENT
Start: 2020-11-02 | End: 2020-11-05 | Stop reason: SDUPTHER

## 2020-11-02 RX ORDER — AMOXICILLIN AND CLAVULANATE POTASSIUM 875; 125 MG/1; MG/1
1 TABLET, FILM COATED ORAL EVERY 12 HOURS SCHEDULED
Qty: 10 TABLET | Refills: 0 | Status: SHIPPED | OUTPATIENT
Start: 2020-11-02 | End: 2020-11-07

## 2020-11-04 DIAGNOSIS — K21.9 GASTROESOPHAGEAL REFLUX DISEASE WITHOUT ESOPHAGITIS: Primary | ICD-10-CM

## 2020-11-04 RX ORDER — PANTOPRAZOLE SODIUM 40 MG/1
40 TABLET, DELAYED RELEASE ORAL 2 TIMES DAILY
Qty: 180 TABLET | Refills: 3 | Status: SHIPPED | OUTPATIENT
Start: 2020-11-04 | End: 2022-02-10

## 2020-11-05 DIAGNOSIS — I10 ORTHOSTATIC HYPERTENSION: ICD-10-CM

## 2020-11-05 RX ORDER — MIDODRINE HYDROCHLORIDE 2.5 MG/1
2.5 TABLET ORAL 3 TIMES DAILY
Qty: 30 TABLET | Refills: 11 | Status: SHIPPED | OUTPATIENT
Start: 2020-11-05 | End: 2021-01-04

## 2020-11-06 ENCOUNTER — TELEPHONE (OUTPATIENT)
Dept: PALLIATIVE MEDICINE | Facility: CLINIC | Age: 75
End: 2020-11-06

## 2020-11-06 ENCOUNTER — TELEPHONE (OUTPATIENT)
Dept: FAMILY MEDICINE CLINIC | Facility: CLINIC | Age: 75
End: 2020-11-06

## 2020-11-09 DIAGNOSIS — K11.20 SIALADENITIS: Primary | ICD-10-CM

## 2020-11-09 RX ORDER — AMOXICILLIN AND CLAVULANATE POTASSIUM 875; 125 MG/1; MG/1
1 TABLET, FILM COATED ORAL EVERY 12 HOURS SCHEDULED
Qty: 14 TABLET | Refills: 0 | Status: SHIPPED | OUTPATIENT
Start: 2020-11-09 | End: 2020-11-16

## 2020-11-10 ENCOUNTER — OFFICE VISIT (OUTPATIENT)
Dept: PALLIATIVE MEDICINE | Facility: CLINIC | Age: 75
End: 2020-11-10
Payer: MEDICARE

## 2020-11-10 VITALS
RESPIRATION RATE: 18 BRPM | HEART RATE: 92 BPM | WEIGHT: 194.2 LBS | BODY MASS INDEX: 23.03 KG/M2 | DIASTOLIC BLOOD PRESSURE: 80 MMHG | TEMPERATURE: 97.9 F | OXYGEN SATURATION: 91 % | SYSTOLIC BLOOD PRESSURE: 158 MMHG

## 2020-11-10 DIAGNOSIS — Z51.5 PALLIATIVE CARE PATIENT: ICD-10-CM

## 2020-11-10 DIAGNOSIS — R55 SYNCOPE, UNSPECIFIED SYNCOPE TYPE: ICD-10-CM

## 2020-11-10 DIAGNOSIS — N40.1 BENIGN PROSTATIC HYPERPLASIA WITH URINARY OBSTRUCTION: Chronic | ICD-10-CM

## 2020-11-10 DIAGNOSIS — E88.01 AAT (ALPHA-1-ANTITRYPSIN) DEFICIENCY (HCC): ICD-10-CM

## 2020-11-10 DIAGNOSIS — K21.9 GASTROESOPHAGEAL REFLUX DISEASE, UNSPECIFIED WHETHER ESOPHAGITIS PRESENT: ICD-10-CM

## 2020-11-10 DIAGNOSIS — I10 ESSENTIAL HYPERTENSION: ICD-10-CM

## 2020-11-10 DIAGNOSIS — J96.11 CHRONIC RESPIRATORY FAILURE WITH HYPOXIA (HCC): ICD-10-CM

## 2020-11-10 DIAGNOSIS — G47.00 INSOMNIA, UNSPECIFIED TYPE: ICD-10-CM

## 2020-11-10 DIAGNOSIS — Z87.891 FORMER SMOKER: ICD-10-CM

## 2020-11-10 DIAGNOSIS — I27.20 PULMONARY HYPERTENSION (HCC): ICD-10-CM

## 2020-11-10 DIAGNOSIS — J43.2 CENTRILOBULAR EMPHYSEMA (HCC): Primary | ICD-10-CM

## 2020-11-10 DIAGNOSIS — N13.8 BENIGN PROSTATIC HYPERPLASIA WITH URINARY OBSTRUCTION: Chronic | ICD-10-CM

## 2020-11-10 DIAGNOSIS — R45.89 DYSPHORIC MOOD: ICD-10-CM

## 2020-11-10 DIAGNOSIS — I87.2 CHRONIC VENOUS INSUFFICIENCY: ICD-10-CM

## 2020-11-10 DIAGNOSIS — I27.82 OTHER CHRONIC PULMONARY EMBOLISM WITHOUT ACUTE COR PULMONALE (HCC): ICD-10-CM

## 2020-11-10 PROCEDURE — 99215 OFFICE O/P EST HI 40 MIN: CPT | Performed by: INTERNAL MEDICINE

## 2020-11-10 RX ORDER — TRAZODONE HYDROCHLORIDE 100 MG/1
100 TABLET ORAL
Qty: 30 TABLET | Refills: 2 | Status: SHIPPED | OUTPATIENT
Start: 2020-11-10 | End: 2021-02-09 | Stop reason: SDUPTHER

## 2020-11-10 RX ORDER — OLANZAPINE 5 MG/1
5 TABLET ORAL
Qty: 30 TABLET | Refills: 2 | Status: SHIPPED | OUTPATIENT
Start: 2020-11-10 | End: 2020-12-08 | Stop reason: SDUPTHER

## 2020-11-15 DIAGNOSIS — F41.9 ANXIETY: ICD-10-CM

## 2020-11-16 RX ORDER — LORAZEPAM 1 MG/1
TABLET ORAL
Qty: 60 TABLET | Refills: 0 | Status: SHIPPED | OUTPATIENT
Start: 2020-11-16 | End: 2020-12-17

## 2020-11-20 ENCOUNTER — OFFICE VISIT (OUTPATIENT)
Dept: FAMILY MEDICINE CLINIC | Facility: CLINIC | Age: 75
End: 2020-11-20
Payer: MEDICARE

## 2020-11-20 VITALS
DIASTOLIC BLOOD PRESSURE: 64 MMHG | RESPIRATION RATE: 16 BRPM | SYSTOLIC BLOOD PRESSURE: 126 MMHG | OXYGEN SATURATION: 96 % | TEMPERATURE: 97.3 F | WEIGHT: 192.7 LBS | BODY MASS INDEX: 22.3 KG/M2 | HEART RATE: 92 BPM | HEIGHT: 78 IN

## 2020-11-20 DIAGNOSIS — J96.11 CHRONIC RESPIRATORY FAILURE WITH HYPOXIA (HCC): ICD-10-CM

## 2020-11-20 DIAGNOSIS — Z01.818 PREOP EXAMINATION: Primary | ICD-10-CM

## 2020-11-20 DIAGNOSIS — I27.82 OTHER CHRONIC PULMONARY EMBOLISM WITHOUT ACUTE COR PULMONALE (HCC): ICD-10-CM

## 2020-11-20 DIAGNOSIS — D69.6 THROMBOCYTOPENIA (HCC): ICD-10-CM

## 2020-11-20 DIAGNOSIS — E88.01 AAT (ALPHA-1-ANTITRYPSIN) DEFICIENCY (HCC): ICD-10-CM

## 2020-11-20 DIAGNOSIS — H25.9 AGE-RELATED CATARACT OF BOTH EYES, UNSPECIFIED AGE-RELATED CATARACT TYPE: ICD-10-CM

## 2020-11-20 PROCEDURE — 99214 OFFICE O/P EST MOD 30 MIN: CPT | Performed by: FAMILY MEDICINE

## 2020-11-23 RX ORDER — PANTOPRAZOLE SODIUM 40 MG/1
TABLET, DELAYED RELEASE ORAL
Qty: 60 TABLET | Refills: 0 | OUTPATIENT
Start: 2020-11-23

## 2020-11-24 DIAGNOSIS — J96.11 CHRONIC RESPIRATORY FAILURE WITH HYPOXIA (HCC): ICD-10-CM

## 2020-11-25 DIAGNOSIS — J96.11 CHRONIC RESPIRATORY FAILURE WITH HYPOXIA (HCC): ICD-10-CM

## 2020-11-25 RX ORDER — BUSPIRONE HYDROCHLORIDE 10 MG/1
TABLET ORAL
Qty: 90 TABLET | Refills: 0 | OUTPATIENT
Start: 2020-11-25

## 2020-11-25 RX ORDER — BUSPIRONE HYDROCHLORIDE 10 MG/1
TABLET ORAL
Qty: 90 TABLET | Refills: 0 | Status: SHIPPED | OUTPATIENT
Start: 2020-11-25 | End: 2020-12-27 | Stop reason: SDUPTHER

## 2020-12-07 DIAGNOSIS — J43.2 CENTRILOBULAR EMPHYSEMA (HCC): ICD-10-CM

## 2020-12-08 ENCOUNTER — OFFICE VISIT (OUTPATIENT)
Dept: PALLIATIVE MEDICINE | Facility: CLINIC | Age: 75
End: 2020-12-08
Payer: MEDICARE

## 2020-12-08 VITALS
RESPIRATION RATE: 22 BRPM | BODY MASS INDEX: 22.55 KG/M2 | SYSTOLIC BLOOD PRESSURE: 180 MMHG | HEART RATE: 76 BPM | TEMPERATURE: 96 F | DIASTOLIC BLOOD PRESSURE: 100 MMHG | WEIGHT: 192.6 LBS

## 2020-12-08 DIAGNOSIS — E88.01 AAT (ALPHA-1-ANTITRYPSIN) DEFICIENCY (HCC): ICD-10-CM

## 2020-12-08 DIAGNOSIS — J96.11 CHRONIC RESPIRATORY FAILURE WITH HYPOXIA (HCC): ICD-10-CM

## 2020-12-08 DIAGNOSIS — I27.20 PULMONARY HYPERTENSION (HCC): ICD-10-CM

## 2020-12-08 DIAGNOSIS — I27.82 OTHER CHRONIC PULMONARY EMBOLISM WITHOUT ACUTE COR PULMONALE (HCC): ICD-10-CM

## 2020-12-08 DIAGNOSIS — G47.00 INSOMNIA, UNSPECIFIED TYPE: ICD-10-CM

## 2020-12-08 DIAGNOSIS — R45.89 DYSPHORIC MOOD: ICD-10-CM

## 2020-12-08 DIAGNOSIS — Z51.5 PALLIATIVE CARE PATIENT: ICD-10-CM

## 2020-12-08 DIAGNOSIS — J43.2 CENTRILOBULAR EMPHYSEMA (HCC): Primary | ICD-10-CM

## 2020-12-08 DIAGNOSIS — I10 ESSENTIAL HYPERTENSION: ICD-10-CM

## 2020-12-08 PROBLEM — R26.81 UNSTEADY GAIT: Chronic | Status: ACTIVE | Noted: 2020-12-08

## 2020-12-08 PROCEDURE — 99215 OFFICE O/P EST HI 40 MIN: CPT | Performed by: INTERNAL MEDICINE

## 2020-12-08 RX ORDER — OLANZAPINE 5 MG/1
7.5 TABLET ORAL
Qty: 45 TABLET | Refills: 2 | Status: SHIPPED | OUTPATIENT
Start: 2020-12-08 | End: 2021-02-09 | Stop reason: SDUPTHER

## 2020-12-08 RX ORDER — OLANZAPINE 5 MG/1
7.5 TABLET ORAL
Qty: 30 TABLET | Refills: 2 | Status: SHIPPED | OUTPATIENT
Start: 2020-12-08 | End: 2020-12-08

## 2020-12-08 RX ORDER — BUDESONIDE 0.5 MG/2ML
0.5 INHALANT ORAL 2 TIMES DAILY
Qty: 10 VIAL | Refills: 0 | Status: SHIPPED | OUTPATIENT
Start: 2020-12-08 | End: 2020-12-16

## 2020-12-08 RX ORDER — FORMOTEROL FUMARATE 20 UG/2ML
20 SOLUTION RESPIRATORY (INHALATION) 2 TIMES DAILY
Qty: 60 VIAL | Refills: 11 | Status: SHIPPED | OUTPATIENT
Start: 2020-12-08 | End: 2020-12-16

## 2020-12-09 ENCOUNTER — PATIENT OUTREACH (OUTPATIENT)
Dept: FAMILY MEDICINE CLINIC | Facility: CLINIC | Age: 75
End: 2020-12-09

## 2020-12-09 DIAGNOSIS — J43.2 CENTRILOBULAR EMPHYSEMA (HCC): ICD-10-CM

## 2020-12-11 DIAGNOSIS — J43.2 CENTRILOBULAR EMPHYSEMA (HCC): ICD-10-CM

## 2020-12-16 RX ORDER — FORMOTEROL FUMARATE DIHYDRATE 20 UG/2ML
SOLUTION RESPIRATORY (INHALATION)
Qty: 60 VIAL | Refills: 11 | Status: SHIPPED | OUTPATIENT
Start: 2020-12-16 | End: 2020-12-18 | Stop reason: SDUPTHER

## 2020-12-16 RX ORDER — BUDESONIDE 0.5 MG/2ML
INHALANT ORAL
Qty: 60 VIAL | Refills: 11 | Status: SHIPPED | OUTPATIENT
Start: 2020-12-16 | End: 2020-12-18 | Stop reason: SDUPTHER

## 2020-12-17 DIAGNOSIS — F41.9 ANXIETY: ICD-10-CM

## 2020-12-17 RX ORDER — LORAZEPAM 1 MG/1
TABLET ORAL
Qty: 60 TABLET | Refills: 3 | Status: SHIPPED | OUTPATIENT
Start: 2020-12-17 | End: 2021-04-26

## 2020-12-18 DIAGNOSIS — J43.2 CENTRILOBULAR EMPHYSEMA (HCC): ICD-10-CM

## 2020-12-18 RX ORDER — FORMOTEROL FUMARATE DIHYDRATE 20 UG/2ML
20 SOLUTION RESPIRATORY (INHALATION) 2 TIMES DAILY
Qty: 60 VIAL | Refills: 11 | Status: SHIPPED | OUTPATIENT
Start: 2020-12-18 | End: 2021-07-14 | Stop reason: SDUPTHER

## 2020-12-18 RX ORDER — BUDESONIDE 0.5 MG/2ML
0.5 INHALANT ORAL 2 TIMES DAILY
Qty: 10 VIAL | Refills: 0 | OUTPATIENT
Start: 2020-12-18 | End: 2020-12-23

## 2020-12-18 RX ORDER — FORMOTEROL FUMARATE 20 UG/2ML
20 SOLUTION RESPIRATORY (INHALATION) 2 TIMES DAILY
Qty: 60 VIAL | Refills: 11 | OUTPATIENT
Start: 2020-12-18

## 2020-12-18 RX ORDER — BUDESONIDE 0.5 MG/2ML
0.5 INHALANT ORAL 2 TIMES DAILY
Qty: 60 VIAL | Refills: 11 | Status: SHIPPED | OUTPATIENT
Start: 2020-12-18 | End: 2021-07-14 | Stop reason: SDUPTHER

## 2020-12-27 DIAGNOSIS — J96.11 CHRONIC RESPIRATORY FAILURE WITH HYPOXIA (HCC): ICD-10-CM

## 2020-12-27 RX ORDER — BUSPIRONE HYDROCHLORIDE 10 MG/1
TABLET ORAL
Qty: 90 TABLET | Refills: 0 | Status: SHIPPED | OUTPATIENT
Start: 2020-12-27 | End: 2021-01-04

## 2020-12-29 ENCOUNTER — TELEPHONE (OUTPATIENT)
Dept: PULMONOLOGY | Facility: CLINIC | Age: 75
End: 2020-12-29

## 2020-12-29 NOTE — TELEPHONE ENCOUNTER
Patient left a VM asking if we can write a letter stating he cannot walk to his mailbox to get his mail because of his diagnoses  He would like them to drop his mail off at his door  Please advise

## 2021-01-04 ENCOUNTER — OFFICE VISIT (OUTPATIENT)
Dept: FAMILY MEDICINE CLINIC | Facility: CLINIC | Age: 76
End: 2021-01-04
Payer: MEDICARE

## 2021-01-04 VITALS
DIASTOLIC BLOOD PRESSURE: 72 MMHG | BODY MASS INDEX: 22.33 KG/M2 | WEIGHT: 190.8 LBS | OXYGEN SATURATION: 93 % | TEMPERATURE: 98 F | SYSTOLIC BLOOD PRESSURE: 120 MMHG | HEART RATE: 94 BPM

## 2021-01-04 DIAGNOSIS — J96.11 CHRONIC RESPIRATORY FAILURE WITH HYPOXIA (HCC): ICD-10-CM

## 2021-01-04 DIAGNOSIS — J43.2 CENTRILOBULAR EMPHYSEMA (HCC): ICD-10-CM

## 2021-01-04 DIAGNOSIS — Z51.5 PALLIATIVE CARE PATIENT: ICD-10-CM

## 2021-01-04 DIAGNOSIS — I27.20 PULMONARY HYPERTENSION (HCC): ICD-10-CM

## 2021-01-04 DIAGNOSIS — R53.1 WEAKNESS GENERALIZED: Primary | ICD-10-CM

## 2021-01-04 DIAGNOSIS — I10 ESSENTIAL HYPERTENSION: ICD-10-CM

## 2021-01-04 DIAGNOSIS — I10 ORTHOSTATIC HYPERTENSION: ICD-10-CM

## 2021-01-04 PROCEDURE — 99214 OFFICE O/P EST MOD 30 MIN: CPT | Performed by: FAMILY MEDICINE

## 2021-01-04 RX ORDER — MIDODRINE HYDROCHLORIDE 2.5 MG/1
2.5 TABLET ORAL 3 TIMES DAILY
Qty: 90 TABLET | Refills: 11 | Status: ON HOLD | OUTPATIENT
Start: 2021-01-04 | End: 2021-11-18 | Stop reason: SDUPTHER

## 2021-01-04 RX ORDER — AMLODIPINE BESYLATE 2.5 MG/1
2.5 TABLET ORAL DAILY
Qty: 90 TABLET | Refills: 3 | Status: SHIPPED | OUTPATIENT
Start: 2021-01-04 | End: 2021-02-24 | Stop reason: HOSPADM

## 2021-01-04 RX ORDER — BUSPIRONE HYDROCHLORIDE 10 MG/1
10 TABLET ORAL 3 TIMES DAILY
Qty: 90 TABLET | Refills: 2 | Status: SHIPPED | OUTPATIENT
Start: 2021-01-04 | End: 2021-06-24

## 2021-01-04 NOTE — PROGRESS NOTES
Subjective:           Problem List Items Addressed This Visit        Respiratory    Chronic respiratory failure with hypoxia (HCC) stable 93% RA stable cont medications Inhalres pulm support    Centrilobular emphysema (HCC)       Cardiovascular and Mediastinum    Pulmonary hypertension (HCC) stable cont medications       Other    Weakness generalized - Primary unchanged    Palliative care patient oversight call as needed        Labs ordered pulmonary f/u CT scan pending Covid asap      Orders Placed This Encounter   Procedures    Comprehensive metabolic panel     This is a patient instruction: Patient fasting for 8 hours or longer recommended  Standing Status:   Future     Standing Expiration Date:   1/4/2022    CBC and differential     This is a patient instruction: This test is non-fasting  Please drink two glasses of water morning of bloodwork  Standing Status:   Future     Standing Expiration Date:   1/4/2022       Patient Instructions   Continue pulmonary support pulmonary follow-up continue current medications labs as ordered stay current with screening   hi protein diet  boost supplement   labs    BMI Counseling: Body mass index is 22 33 kg/m²  Discussed the patient's BMI with him  [facility]    Chief Complaint   Patient presents with    Follow-up     HPI Mikayla Davison is in for evaluation follow-up history of alpha-1 antitrypsin deficiency chronic respiratory failure hypoxemia COPD essential hypertension  He has a lung mass he is in palliative care  He has had orthostatic hypotension his treated as such   Sat 93% sl NC    /72   Pulse 94   Temp 98 °F (36 7 °C) (Tympanic)   Wt 86 5 kg (190 lb 12 8 oz)   SpO2 93%   BMI 22 33 kg/m²       Allergies   Allergen Reactions    Chantix [Varenicline]      Caused prostate infection       Current Outpatient Medications on File Prior to Visit   Medication Sig Dispense Refill    budesonide (PULMICORT) 0 5 mg/2 mL nebulizer solution Take 1 vial (0 5 mg total) by nebulization 2 (two) times a day Rinse mouth after use   60 vial 11    cholestyramine sugar free (QUESTRAN LIGHT) 4 g packet Take 1 packet (4 g total) by mouth 2 (two) times a day 60 packet 1    doxylamine (Unisom SleepTabs) 25 MG tablet Take 25 mg by mouth daily at bedtime as needed for sleep      famotidine (PEPCID) 20 mg tablet Take 1 tablet (20 mg total) by mouth daily 30 tablet 3    finasteride (PROSCAR) 5 mg tablet Take 1 tablet (5 mg total) by mouth every morning 90 tablet 3    fluticasone (FLONASE) 50 mcg/act nasal spray 2 sprays into each nostril daily 16 g 5    formoterol (Perforomist) 20 MCG/2ML nebulizer solution Take 1 vial (20 mcg total) by nebulization 2 (two) times a day 60 vial 11    gabapentin (NEURONTIN) 300 mg capsule Take 1 capsule (300 mg total) by mouth 3 (three) times a day 90 capsule 6    guaiFENesin (ROBITUSSIN) 100 MG/5ML oral liquid Take 200 mg by mouth 2 (two) times a day      ipratropium-albuterol (DUO-NEB) 0 5-2 5 mg/3 mL nebulizer solution Take 1 vial (3 mL total) by nebulization 4 (four) times a day 120 vial 6    LORazepam (ATIVAN) 1 mg tablet TAKE ONE TABLET BY MOUTH TWICE DAILY  60 tablet 3    OLANZapine (ZyPREXA) 5 mg tablet Take 1 5 tablets (7 5 mg total) by mouth daily at bedtime 45 tablet 2    OXYGEN-HELIUM IN Inhale 2L at rest- 3L with activity      pantoprazole (PROTONIX) 40 mg tablet Take 1 tablet (40 mg total) by mouth 2 (two) times a day 180 tablet 3    tamsulosin (FLOMAX) 0 4 mg Half a tablet daily 30 capsule 5    tamsulosin (FLOMAX) 0 4 mg Take 0 4 mg by mouth daily      traZODone (DESYREL) 100 mg tablet Take 1 tablet (100 mg total) by mouth daily at bedtime 30 tablet 2    Ventolin  (90 Base) MCG/ACT inhaler Inhale 1 puff every 6 (six) hours as needed for shortness of breath 1 Inhaler 11    ZEMAIRA infusion once a week       [DISCONTINUED] amLODIPine (NORVASC) 2 5 mg tablet Take 1 tablet (2 5 mg total) by mouth daily 30 tablet 3    [DISCONTINUED] busPIRone (BUSPAR) 10 mg tablet TAKE ONE TABLET BY MOUTH THREE TIMES DAILY  90 tablet 0    [DISCONTINUED] busPIRone (BUSPAR) 10 mg tablet TAKE ONE TABLET BY MOUTH THREE TIMES DAILY  90 tablet 0    [DISCONTINUED] midodrine (PROAMATINE) 2 5 mg tablet Take 1 tablet (2 5 mg total) by mouth 3 (three) times a day 30 tablet 11     No current facility-administered medications on file prior to visit  Past Medical History:   Diagnosis Date    Anesthesia complication     Difficult to wake up    Arthritis     BPH (benign prostatic hyperplasia)     urinary frequency    Cancer (HCC)     basal cell neck, face    COPD exacerbation (HCC) 12/29/2019    Full dentures     Hiatal hernia     History of methicillin resistant staphylococcus aureus (MRSA)     10/11/2018 MRSA (nares) positive    Irritable bowel syndrome     Kidney stone     at least 7 episodes    Liver disease     Alpha 1- enzyme deficiency - diagnosed 2002  has been on weekly replacement therapy since then    Pulmonary emphysema ([facility] [address] )     1/25/15  FEV1 - 2 45 liters or 59% of predicted    RSV infection 12/2017    Wears glasses     for driving only       Past Surgical History:   Procedure Laterality Date    BACK SURGERY  2008    discectomy    COLONOSCOPY      COLONOSCOPY N/A 3/10/2017    Procedure: Earlene Brodie;  Surgeon: Roxanna Chicas MD;  Location: [place] [address] GI LAB; Service:    Lisa Fan      removal of kidney stones    ESOPHAGOGASTRODUODENOSCOPY N/A 3/10/2017    Procedure: ESOPHAGOGASTRODUODENOSCOPY (EGD); Surgeon: Roxanna Chicas MD;  Location: [facility] GI LAB; Service:     LITHOTRIPSY      [place] ESOPHAGOGASTRODUODENOSCOPY TRANSORAL DIAGNOSTIC N/A 11/20/2018    Procedure: ESOPHAGOGASTRODUODENOSCOPY (EGD); Surgeon: Roxanna Chicas MD;  Location: [facility] GI LAB;   Service: Gastroenterology    TONSILLECTOMY      VEIN LIGATION AND STRIPPING Bilateral     1980's          reports that he quit smoking about 3 years ago  He has a 60 00 pack-year smoking history  He has never used smokeless tobacco  He reports previous alcohol use  He reports previous drug use  reports that he quit smoking about 3 years ago  He has a 60 00 pack-year smoking history  He has never used smokeless tobacco         Review of Systems   Constitutional: Negative for fatigue and fever  HENT: Positive for dental problem  Negative for congestion, ear discharge, ear pain, facial swelling, mouth sores, nosebleeds, postnasal drip, trouble swallowing and voice change  Edentulous   Eyes: Positive for visual disturbance  Left   Respiratory: Positive for shortness of breath  Negative for cough  On 02 3l   Cardiovascular: Negative for chest pain  Gastrointestinal: Negative for abdominal distention and blood in stool  Genitourinary: Negative for hematuria  Neurological: Negative for seizures, facial asymmetry, speech difficulty and light-headedness  Hematological: Negative for adenopathy  Bruises/bleeds easily  Psychiatric/Behavioral: Negative for behavioral problems and decreased concentration  The patient is not nervous/anxious  Physical Exam  Vitals signs and nursing note reviewed  Constitutional:       General: He is not in acute distress  Appearance: He is well-developed  He is not toxic-appearing  Comments: Frail    o2 and mask   HENT:      Head: Normocephalic and atraumatic  Right Ear: External ear normal       Left Ear: External ear normal       Mouth/Throat:      Mouth: Mucous membranes are moist       Pharynx: No oropharyngeal exudate  Eyes:      General: No scleral icterus  Right eye: No discharge  Left eye: No discharge  Conjunctiva/sclera: Conjunctivae normal       Pupils: Pupils are equal, round, and reactive to light  Comments: L catarct   Neck:      Musculoskeletal: Normal range of motion and neck supple  Trachea: No tracheal deviation     Cardiovascular: Rate and Rhythm: Normal rate and regular rhythm  Heart sounds: No murmur  No friction rub  Pulmonary:      Effort: No respiratory distress  Breath sounds: No stridor  Wheezing present  No rales  Comments: Distant exp wheeze on o2 @3 l nc  Abdominal:      General: Bowel sounds are normal  There is no distension  Palpations: Abdomen is soft  Tenderness: There is no guarding or rebound  Musculoskeletal:         General: No deformity  Lymphadenopathy:      Cervical: No cervical adenopathy  Skin:     General: Skin is warm and dry  Capillary Refill: Capillary refill takes 2 to 3 seconds  Findings: No rash  Comments: AK   nose hands   Neurological:      General: No focal deficit present  Mental Status: He is alert and oriented to person, place, and time  Mental status is at baseline  Cranial Nerves: No cranial nerve deficit  Motor: No abnormal muscle tone  Coordination: Coordination normal    Psychiatric:         Behavior: Behavior normal          Thought Content:  Thought content normal          Judgment: Judgment normal

## 2021-01-04 NOTE — PATIENT INSTRUCTIONS
Continue pulmonary support pulmonary follow-up continue current medications labs as ordered stay current with screening   hi protein diet     boost supplement   labs     COVID vaccine

## 2021-01-13 ENCOUNTER — HOSPITAL ENCOUNTER (OUTPATIENT)
Dept: RADIOLOGY | Facility: HOSPITAL | Age: 76
Discharge: HOME | End: 2021-01-13
Payer: MEDICARE

## 2021-01-13 DIAGNOSIS — R91.1 LUNG NODULE: ICD-10-CM

## 2021-01-13 PROCEDURE — G1004 CDSM NDSC: HCPCS

## 2021-01-13 PROCEDURE — 71250 CT THORAX DX C-: CPT

## 2021-01-20 ENCOUNTER — OFFICE VISIT (OUTPATIENT)
Dept: PULMONOLOGY | Facility: MEDICAL CENTER | Age: 76
End: 2021-01-20
Payer: MEDICARE

## 2021-01-20 VITALS
WEIGHT: 191 LBS | SYSTOLIC BLOOD PRESSURE: 154 MMHG | BODY MASS INDEX: 22.1 KG/M2 | HEART RATE: 103 BPM | TEMPERATURE: 97.2 F | HEIGHT: 78 IN | RESPIRATION RATE: 12 BRPM | DIASTOLIC BLOOD PRESSURE: 86 MMHG

## 2021-01-20 DIAGNOSIS — J96.11 CHRONIC RESPIRATORY FAILURE WITH HYPOXIA (HCC): ICD-10-CM

## 2021-01-20 DIAGNOSIS — J43.2 CENTRILOBULAR EMPHYSEMA (HCC): Primary | ICD-10-CM

## 2021-01-20 DIAGNOSIS — E88.01 AAT (ALPHA-1-ANTITRYPSIN) DEFICIENCY (HCC): ICD-10-CM

## 2021-01-20 DIAGNOSIS — R91.1 LUNG NODULE: ICD-10-CM

## 2021-01-20 PROCEDURE — 99214 OFFICE O/P EST MOD 30 MIN: CPT | Performed by: INTERNAL MEDICINE

## 2021-01-21 ENCOUNTER — IMMUNIZATIONS (OUTPATIENT)
Dept: FAMILY MEDICINE CLINIC | Facility: HOSPITAL | Age: 76
End: 2021-01-21

## 2021-01-21 DIAGNOSIS — J44.1 COPD EXACERBATION (HCC): ICD-10-CM

## 2021-01-21 DIAGNOSIS — Z23 ENCOUNTER FOR IMMUNIZATION: Primary | ICD-10-CM

## 2021-01-21 PROCEDURE — 0011A SARS-COV-2 / COVID-19 MRNA VACCINE (MODERNA) 100 MCG: CPT | Performed by: NURSE PRACTITIONER

## 2021-01-21 PROCEDURE — 91301 SARS-COV-2 / COVID-19 MRNA VACCINE (MODERNA) 100 MCG: CPT | Performed by: NURSE PRACTITIONER

## 2021-01-21 RX ORDER — IPRATROPIUM BROMIDE AND ALBUTEROL SULFATE 2.5; .5 MG/3ML; MG/3ML
SOLUTION RESPIRATORY (INHALATION)
Qty: 360 ML | Refills: 0 | Status: SHIPPED | OUTPATIENT
Start: 2021-01-21 | End: 2021-04-26

## 2021-01-21 NOTE — ASSESSMENT & PLAN NOTE
He has been receiving weekly infusion of IV Zemaira every Monday  He has been getting this for several years    He has alpha-1 antitrypsin deficiency

## 2021-01-21 NOTE — ASSESSMENT & PLAN NOTE
Severe emphysema with FEV1 of 1 48 L or 44% of predicted  He is doing well on his regimen of nebulized Perforomist 20 mcg b i d  And budesonide 0 5 mg b i d  He uses nebulized treatment DuoNeb when needed up to 4 times a day does not need to take this that often  I did instruct him how he can make a telephone call to register for the COVID-19 vaccine to [facility]    We provided him with information and phone number

## 2021-01-21 NOTE — PROGRESS NOTES
Assessment/Plan        Problem List Items Addressed This Visit        Digestive    AAT (alpha-1-antitrypsin) deficiency ([facility] [address] )     He has been receiving weekly infusion of IV Zemaira every Monday  He has been getting this for several years  He has alpha-1 antitrypsin deficiency            Respiratory    Chronic respiratory failure with hypoxia (HCC)     Continue oxygen 3 liters/minute on continuous basis         Centrilobular emphysema (HCC) - Primary     Severe emphysema with FEV1 of 1 48 L or 44% of predicted  He is doing well on his regimen of nebulized Perforomist 20 mcg b i d  And budesonide 0 5 mg b i d  He uses nebulized treatment DuoNeb when needed up to 4 times a day does not need to take this that often  I did instruct him how he can make a telephone call to register for the COVID-19 vaccine to [facility]  We provided him with information and phone number            Other    Lung nodule     I reviewed CT of chest done January 13 with him  His been was complete resolution of all nodule seen previously  There is some residual ground-glass density left lower lobe where he previously had nodule  CC:  No new problems  Does have chronic shortness of breath with activity      HPI     Camilo has severe COPD with FEV1 of 1 78 L or 44% of predicted  He also is on oxygen continuously at 3 liters/minute  His medical supply company is [employer]   He does also have history of alpha-1 antitrypsin deficiency and receive IV anti protease replacement therapy at home by visiting nurse  He has been getting this for several years  He also has history of PE in the past and lung nodules  He receives his IV Zemaira on Mondays  He does use nebulizer medication with performist 20 mcg twice a day and budesonide 0 5 mg twice a day  Periodically will uses nebulizer treatment witth DuoNeb  He denies any increased shortness of breath    He does get short of breath walking only 50 ft     Follow-up CT scan of the chest without contrast was done 01/13/2021 and I reviewed the CT scan images with him and we compared to 1 done August 19, 2020  Nodular densities in left upper lobe have resolved  Also a left lower lobe nodule seen before has nearly completely resolved with minimal residual ground-glass opacity  No other new lesions  There is evidence of emphysema  Past Medical History:   Diagnosis Date    Anesthesia complication     Difficult to wake up    Arthritis     BPH (benign prostatic hyperplasia)     urinary frequency    Cancer (HCC)     basal cell neck, face    COPD exacerbation (HCC) 12/29/2019    Full dentures     Hiatal hernia     History of methicillin resistant staphylococcus aureus (MRSA)     10/11/2018 MRSA (nares) positive    Irritable bowel syndrome     Kidney stone     at least 7 episodes    Liver disease     Alpha 1- enzyme deficiency - diagnosed 2002  has been on weekly replacement therapy since then    Pulmonary emphysema ([facility] [address] )     1/25/15  FEV1 - 2 45 liters or 59% of predicted    RSV infection 12/2017    Wears glasses     for driving only       Past Surgical History:   Procedure Laterality Date    BACK SURGERY  2008    discectomy    COLONOSCOPY      COLONOSCOPY N/A 3/10/2017    Procedure: Genevieve Wray;  Surgeon: Ting Malagon MD;  Location: [place] [address] GI LAB; Service:    Mary Lavender      removal of kidney stones    ESOPHAGOGASTRODUODENOSCOPY N/A 3/10/2017    Procedure: ESOPHAGOGASTRODUODENOSCOPY (EGD); Surgeon: Ting Malagon MD;  Location: [facility] GI LAB; Service:     LITHOTRIPSY      [place] ESOPHAGOGASTRODUODENOSCOPY TRANSORAL DIAGNOSTIC N/A 11/20/2018    Procedure: ESOPHAGOGASTRODUODENOSCOPY (EGD); Surgeon: Ting Malagon MD;  Location: [facility] GI LAB;   Service: Gastroenterology    TONSILLECTOMY      VEIN LIGATION AND STRIPPING Bilateral     1980's         Current Outpatient Medications:     amLODIPine (NORVASC) 2 5 mg tablet, Take 1 tablet (2 5 mg total) by mouth daily, Disp: 90 tablet, Rfl: 3    budesonide (PULMICORT) 0 5 mg/2 mL nebulizer solution, Take 1 vial (0 5 mg total) by nebulization 2 (two) times a day Rinse mouth after use , Disp: 60 vial, Rfl: 11    busPIRone (BUSPAR) 10 mg tablet, Take 1 tablet (10 mg total) by mouth 3 (three) times a day, Disp: 90 tablet, Rfl: 2    cholestyramine sugar free (QUESTRAN LIGHT) 4 g packet, Take 1 packet (4 g total) by mouth 2 (two) times a day, Disp: 60 packet, Rfl: 1    doxylamine (Unisom SleepTabs) 25 MG tablet, Take 25 mg by mouth daily at bedtime as needed for sleep, Disp: , Rfl:     famotidine (PEPCID) 20 mg tablet, Take 1 tablet (20 mg total) by mouth daily, Disp: 30 tablet, Rfl: 3    finasteride (PROSCAR) 5 mg tablet, Take 1 tablet (5 mg total) by mouth every morning, Disp: 90 tablet, Rfl: 3    fluticasone (FLONASE) 50 mcg/act nasal spray, 2 sprays into each nostril daily, Disp: 16 g, Rfl: 5    formoterol (Perforomist) 20 MCG/2ML nebulizer solution, Take 1 vial (20 mcg total) by nebulization 2 (two) times a day, Disp: 60 vial, Rfl: 11    gabapentin (NEURONTIN) 300 mg capsule, Take 1 capsule (300 mg total) by mouth 3 (three) times a day, Disp: 90 capsule, Rfl: 6    guaiFENesin (ROBITUSSIN) 100 MG/5ML oral liquid, Take 200 mg by mouth 2 (two) times a day, Disp: , Rfl:     ipratropium-albuterol (DUO-NEB) 0 5-2 5 mg/3 mL nebulizer solution, Take 1 vial (3 mL total) by nebulization 4 (four) times a day, Disp: 120 vial, Rfl: 6    LORazepam (ATIVAN) 1 mg tablet, TAKE ONE TABLET BY MOUTH TWICE DAILY , Disp: 60 tablet, Rfl: 3    midodrine (PROAMATINE) 2 5 mg tablet, Take 1 tablet (2 5 mg total) by mouth 3 (three) times a day, Disp: 90 tablet, Rfl: 11    OLANZapine (ZyPREXA) 5 mg tablet, Take 1 5 tablets (7 5 mg total) by mouth daily at bedtime, Disp: 45 tablet, Rfl: 2    OXYGEN-HELIUM IN, Inhale 2L at rest- 3L with activity, Disp: , Rfl:     pantoprazole (PROTONIX) 40 mg tablet, Take 1 tablet (40 mg total) by mouth 2 (two) times a day, Disp: 180 tablet, Rfl: 3    tamsulosin (FLOMAX) 0 4 mg, Half a tablet daily, Disp: 30 capsule, Rfl: 5    tamsulosin (FLOMAX) 0 4 mg, Take 0 4 mg by mouth daily, Disp: , Rfl:     traZODone (DESYREL) 100 mg tablet, Take 1 tablet (100 mg total) by mouth daily at bedtime, Disp: 30 tablet, Rfl: 2    Ventolin  (90 Base) MCG/ACT inhaler, Inhale 1 puff every 6 (six) hours as needed for shortness of breath, Disp: 1 Inhaler, Rfl: 11    ZEMAIRA infusion, once a week , Disp: , Rfl:     Allergies   Allergen Reactions    Chantix [Varenicline]      Caused prostate infection       Social History     Tobacco Use    Smoking status: Former Smoker     Packs/day: 1 00     Years: 60 00     Pack years: 60 00     Quit date: 8/31/2017     Years since quitting: 3 3    Smokeless tobacco: Never Used    Tobacco comment: quit in august 2017   Substance Use Topics    Alcohol use: Not Currently     Frequency: Never     Comment: stopped in 2009         Family History   Problem Relation Age of Onset    Emphysema Mother         never smoked    Emphysema Father     Cancer Brother         colon    Colon cancer Brother     Ulcerative colitis Family     Liver disease Family        Review of Systems   Constitutional: Negative for chills, fever and unexpected weight change  HENT: Negative for congestion, rhinorrhea and sore throat  Eyes: Negative for discharge and redness  Respiratory: Positive for shortness of breath  Cardiovascular: Negative for chest pain, palpitations and leg swelling  Gastrointestinal: Negative for abdominal distention, abdominal pain and nausea  Endocrine: Negative for polydipsia and polyphagia  Genitourinary: Negative for dysuria  Musculoskeletal: Negative for joint swelling and myalgias  Skin: Negative for rash  Neurological: Negative for light-headedness  Sometimes loses his balance  Psychiatric/Behavioral: Negative for confusion  Vitals:    01/20/21 0939   BP: 154/86   Pulse: 103   Resp: 12   Temp: (!) 97 2 °F (36 2 °C)           Physical Exam  Constitutional:       General: He is not in acute distress  Appearance: He is well-developed  HENT:      Head: Normocephalic  Nose: Nose normal       Mouth/Throat:      Mouth: Mucous membranes are moist       Pharynx: Oropharynx is clear  No oropharyngeal exudate  Eyes:      Conjunctiva/sclera: Conjunctivae normal       Pupils: Pupils are equal, round, and reactive to light  Neck:      Musculoskeletal: Neck supple  No neck rigidity  Cardiovascular:      Rate and Rhythm: Normal rate and regular rhythm  Heart sounds: Normal heart sounds  Pulmonary:      Effort: Pulmonary effort is normal       Breath sounds: No wheezing  Comments: Lung sounds are decreased but clear  Abdominal:      General: There is no distension  Palpations: Abdomen is soft  Tenderness: There is no abdominal tenderness  Musculoskeletal:      Comments: Trace edema lower tibial surfaces  No cyanosis or clubbing   Lymphadenopathy:      Cervical: No cervical adenopathy  Skin:     General: Skin is warm and dry  Neurological:      Mental Status: He is alert and oriented to person, place, and time     Psychiatric:         Mood and Affect: Mood normal          Behavior: Behavior normal            Office Spirometry Results:

## 2021-01-21 NOTE — ASSESSMENT & PLAN NOTE
I reviewed CT of chest done January 13 with him  His been was complete resolution of all nodule seen previously  There is some residual ground-glass density left lower lobe where he previously had nodule

## 2021-02-09 ENCOUNTER — OFFICE VISIT (OUTPATIENT)
Dept: PALLIATIVE MEDICINE | Facility: CLINIC | Age: 76
End: 2021-02-09
Payer: MEDICARE

## 2021-02-09 VITALS
HEART RATE: 72 BPM | RESPIRATION RATE: 20 BRPM | BODY MASS INDEX: 22.64 KG/M2 | SYSTOLIC BLOOD PRESSURE: 170 MMHG | WEIGHT: 193.4 LBS | DIASTOLIC BLOOD PRESSURE: 84 MMHG | TEMPERATURE: 97.5 F

## 2021-02-09 DIAGNOSIS — M19.90 ARTHRITIS: ICD-10-CM

## 2021-02-09 DIAGNOSIS — I87.2 CHRONIC VENOUS INSUFFICIENCY: ICD-10-CM

## 2021-02-09 DIAGNOSIS — I10 ESSENTIAL HYPERTENSION: ICD-10-CM

## 2021-02-09 DIAGNOSIS — G47.00 INSOMNIA, UNSPECIFIED TYPE: ICD-10-CM

## 2021-02-09 DIAGNOSIS — R45.89 DYSPHORIC MOOD: ICD-10-CM

## 2021-02-09 DIAGNOSIS — I27.20 PULMONARY HYPERTENSION (HCC): ICD-10-CM

## 2021-02-09 DIAGNOSIS — J96.11 CHRONIC RESPIRATORY FAILURE WITH HYPOXIA (HCC): ICD-10-CM

## 2021-02-09 DIAGNOSIS — E88.01 AAT (ALPHA-1-ANTITRYPSIN) DEFICIENCY (HCC): ICD-10-CM

## 2021-02-09 DIAGNOSIS — R19.7 DIARRHEA, UNSPECIFIED TYPE: ICD-10-CM

## 2021-02-09 DIAGNOSIS — N40.1 BENIGN PROSTATIC HYPERPLASIA WITH URINARY OBSTRUCTION: Chronic | ICD-10-CM

## 2021-02-09 DIAGNOSIS — K21.9 GASTROESOPHAGEAL REFLUX DISEASE, UNSPECIFIED WHETHER ESOPHAGITIS PRESENT: ICD-10-CM

## 2021-02-09 DIAGNOSIS — R26.81 UNSTEADY GAIT: Chronic | ICD-10-CM

## 2021-02-09 DIAGNOSIS — Z51.5 PALLIATIVE CARE PATIENT: ICD-10-CM

## 2021-02-09 DIAGNOSIS — N13.8 BENIGN PROSTATIC HYPERPLASIA WITH URINARY OBSTRUCTION: Chronic | ICD-10-CM

## 2021-02-09 DIAGNOSIS — I27.82 OTHER CHRONIC PULMONARY EMBOLISM WITHOUT ACUTE COR PULMONALE (HCC): ICD-10-CM

## 2021-02-09 DIAGNOSIS — J43.2 CENTRILOBULAR EMPHYSEMA (HCC): Primary | ICD-10-CM

## 2021-02-09 PROCEDURE — 99215 OFFICE O/P EST HI 40 MIN: CPT | Performed by: INTERNAL MEDICINE

## 2021-02-09 RX ORDER — CHOLESTYRAMINE LIGHT 4 G/5.7G
4 POWDER, FOR SUSPENSION ORAL 2 TIMES DAILY
Qty: 60 PACKET | Refills: 1 | Status: SHIPPED | OUTPATIENT
Start: 2021-02-09 | End: 2021-12-02 | Stop reason: DRUGHIGH

## 2021-02-09 RX ORDER — OLANZAPINE 5 MG/1
7.5 TABLET ORAL
Qty: 45 TABLET | Refills: 2 | Status: SHIPPED | OUTPATIENT
Start: 2021-02-09 | End: 2021-04-06 | Stop reason: SDUPTHER

## 2021-02-09 RX ORDER — ACETAMINOPHEN 500 MG
1000 TABLET ORAL EVERY 8 HOURS SCHEDULED
Qty: 90 TABLET | Refills: 3 | Status: SHIPPED | OUTPATIENT
Start: 2021-02-09 | End: 2021-12-02 | Stop reason: SDUPTHER

## 2021-02-09 RX ORDER — TRAZODONE HYDROCHLORIDE 100 MG/1
100 TABLET ORAL
Qty: 30 TABLET | Refills: 2 | Status: SHIPPED | OUTPATIENT
Start: 2021-02-09 | End: 2021-04-06 | Stop reason: SDUPTHER

## 2021-02-09 NOTE — PATIENT INSTRUCTIONS
It was a pleasure to see you again today  Thank you for coming in  · Start tylenol 1000 mg three times/day  · Start Voltaren gel 2 gm applied to Right Knee four times/day  · Refill sent for Olanzapine, Trazadone, Questran  · Referral sent to [facility] in [id number]  · Return in about 2 months  · Call us for refills on medications that we supply, as needed  · If something changes and you need to come in sooner, please call our office  PRESCRIPTION REFILL REMINDER:  All medication refills should be requested prior to [facility] on Friday  Any refill requests after noon on Friday would be addressed the following Monday  Please protect yourself from the novel Coronavirus (COVID-19)! The numbers of cases of Coronavirus are spiking in [address]  This is not a more dangerous virus, but a sign that more people in a community are spreading the virus  Please check the local disease reports near you if you consider travelling this summer  We do not advise travel to any community or State with a rising viral caseload   Wash your hands! Soap and water, or hand  with at least 60% alcohol, are both effective at killing the virus   Wear a mask! This will help protect others from any virus particles you might spread  Your mouth and nose BOTH need to be covered   Keep the distance! Keep 6 feet of distance from others people, even if they seem healthy  Keeping distance protects you from the other person's virus spread   Get a vaccine! The [employer] and [employer] are approved for emergency use in the [place]  These vaccines have been shown to be 90+% effective at preventing severe infections when combined with masking, hand-washing, and distancing    As of 1/26/2021, the following priority groups may get either vaccine:  o nursing home residents and staff  o front-line health workers  o ALL persons over age 72 (ages 76 and up can [facility]  o ANY person over age 12 that has a qualifying risk factor, such as diabetes, lung disease, or obesity    Please use  the following website  to check your eligibility in [place]:   [employer] [address]    If you are under age 72, but still qualify, you may get a shot from many other locations outside [address] [place], [place] [address]vd: [facility], [place], [facility], [place]    We are recommending that ALL our patients get two shots of either vaccine, as early as it is available to them  Please keep an eye on the [place], Osvaldo Landin, or call [phone] to see when you will become eligible  If you are eligible by the criteria above, please ask our team to get you a shot! Numbers of Coronavirus cases are spiking in many [place] States  This is not a more dangerous virus, but a sign that more people in a community are spreading the virus  Please check the local disease reports near you if you consider travelling  As of 1/25/21, we do NOT advise travel outside the [address] [facility] ([facility] or [place])  Check out [place] for [place] data that are updated daily:   [employer]   Global Epidemics from [facility] (OUTPAT[facility]), will give you [id number] information on virus cases:   https://globalepidemics  org/

## 2021-02-09 NOTE — PROGRESS NOTES
Follow-up with Palliative and Supportive Care  Fareed Tomas 67 y o  male [phone]    ASSESSMENT & PLAN:  1  Centrilobular emphysema ([facility] [address] )    2  AAT (alpha-1-antitrypsin) deficiency ([facility] )    3  Chronic respiratory failure with hypoxia (HCC)    4  Pulmonary hypertension ([facility] )    5  Chronic venous insufficiency    6  Other chronic pulmonary embolism without acute cor pulmonale (HCC)    7  Essential hypertension    8  Gastroesophageal reflux disease, unspecified whether esophagitis present    9  Benign prostatic hyperplasia with urinary obstruction    10  Unsteady gait    11  Insomnia, unspecified type    12  Dysphoric mood    13  Palliative care patient    14  AAT (alpha-1-antitrypsin) deficiency ([facility] )    15  Diarrhea, unspecified type    16  Arthritis      #symptom management   - referral to PT to focus on improved ambulation, prior h/o recurrent falls, use of cane; confirmed [facility] facility close to patient; referral placed   - recommend APAP 1000 mg Q8H for generalized arthritic pain control; max 3 g daily; Rx sent to pharmacy   - recommend use of Voltaren gel 2 g applied max QID to R knee; Rx sent to pharmacy   - continue Questran for diarrhea management; refill sent to pharmacy   - continue olanzapine + trazadone for insomnia; refill sent to pharmacy; promotion of proper sleep hygiene   - discussed 's safety with patient    Medication safety issues addressed - no driving under the influence of narcotics, watch for adverse effects including AMS and respiratory depression, keep medications stored in a safe/locked environment  #psychosocial support   - emotional support provided      Next [address] Follow up in 2 months        Requested Prescriptions     Signed Prescriptions Disp Refills    Diclofenac Sodium (VOLTAREN) 1 % 50 g 0     Sig: Apply 2 g topically 4 (four) times a day Apply to R knee    acetaminophen (TYLENOL) 500 mg tablet 90 tablet 3     Sig: Take 2 tablets (1,000 mg total) by mouth every 8 (eight) hours    cholestyramine sugar free (QUESTRAN LIGHT) 4 g packet 60 packet 1     Sig: Take 1 packet (4 g total) by mouth 2 (two) times a day    traZODone (DESYREL) 100 mg tablet 30 tablet 2     Sig: Take 1 tablet (100 mg total) by mouth daily at bedtime    OLANZapine (ZyPREXA) 5 mg tablet 45 tablet 2     Sig: Take 1 5 tablets (7 5 mg total) by mouth daily at bedtime       Medications Discontinued During This Encounter   Medication Reason    cholestyramine sugar free (QUESTRAN LIGHT) 4 g packet Reorder    traZODone (DESYREL) 100 mg tablet Reorder    OLANZapine (ZyPREXA) 5 mg tablet Reorder       Representatives have queried the patient's controlled substance dispensing history in the Prescription Drug Monitoring Program in compliance with regulations before I have prescribed any controlled substances  The prescription history is consistent with prescribed therapy and our practice policies  40 minutes were spent face to face with patient with greater than 50% of the time spent in counseling or coordination of care including discussions of symptom assessment and management, medication review and adjustment, psychosocial support and chart review  All of the patient's questions were answered during this discussion  No follow-ups on file  SUBJECTIVE:  Chief Complaint   Patient presents with    COPD    Shortness of Breath    Gait Problem    Insomnia    Depression    Anxiety    Counseling    Follow-up        HPI    Devi Flynn is a 67 y o  male w/ severe emphysema w/ alpha-1-antitrypsin deficiency, chronic hypoxic respiratory failure on 3LPM O2, pulmonary hypertension w/ chronic PE, former tobacco dependence (60 pack-years, quit 2017), OA, BPH, IBS, idiopathic neuropathy, h/o BCC of the face and neck  He follows w/ Tracey Queen PA-C / Dr Carlos Alberto Proctor (Pulmonology), Dr Marc Ortiz (Cardiology)   He works w/ [employer]      Patient is known to [facility] clinic; last seen by me 12/08/2020 for symptom management, psychosocial support, goals of care  Visit today for symptom management, psychosocial support  PDMP shows no concerns  Last fill of Lorazepam 1 mg x 60 tabs was on 01/12/2021  Overall reports "two complaints" to discussed today  First is R knee pain, chronic  Also reports generalized lower extremity weakness which impacts ambulation; use of cane for support  Prior use of PT/OT but patient states he is unable to follow up as his lifetime benefit of PT/OT is nearing completion ["I have four weeks of PT left "]  No pain medication taken for knee pain  Able to ambulate max 50 feet before dyspnea and LE weakness, requires rest before able to continue  Recurrent falls, last hospitalized 2 months ago; last fall     Secondly, persistent SOB, even at rest  Supplemental O2 concentrator at 7L with use of longer tubing  No reported associated anxiety a/w dyspnea  Overall decreased exercise tolerance with noticeable functional decline per patient  Appetite intact  Does not cook for self  Glorya Zenobia does shopping for self  Stable weight  BM between diarrhea and regular; use of Questran for diarrhea  Requesting refill  BM currently at baseline with no diarrhea today  Denies nausea, vomiting  Overall mood "pretty good" but states frustration with functional decline  Drives for short distances; 21K mile over the past 7 years  Nearly out of sleep medications, requesting refill  COVID vax #1 completed; next scheduled 02/18  Myalgia for short-term after injection; since resolved  The following portions of the medical history were reviewed: past medical history, problem list, medication list, and social history      Current Outpatient Medications:     amLODIPine (NORVASC) 2 5 mg tablet, Take 1 tablet (2 5 mg total) by mouth daily, Disp: 90 tablet, Rfl: 3    acetaminophen (TYLENOL) 500 mg tablet, Take 2 tablets (1,000 mg total) by mouth every 8 (eight) hours, Disp: 90 tablet, Rfl: 3    budesonide (PULMICORT) 0 5 mg/2 mL nebulizer solution, Take 1 vial (0 5 mg total) by nebulization 2 (two) times a day Rinse mouth after use , Disp: 60 vial, Rfl: 11    busPIRone (BUSPAR) 10 mg tablet, Take 1 tablet (10 mg total) by mouth 3 (three) times a day, Disp: 90 tablet, Rfl: 2    cholestyramine sugar free (QUESTRAN LIGHT) 4 g packet, Take 1 packet (4 g total) by mouth 2 (two) times a day, Disp: 60 packet, Rfl: 1    Diclofenac Sodium (VOLTAREN) 1 %, Apply 2 g topically 4 (four) times a day Apply to R knee, Disp: 50 g, Rfl: 0    doxylamine (Unisom SleepTabs) 25 MG tablet, Take 25 mg by mouth daily at bedtime as needed for sleep, Disp: , Rfl:     famotidine (PEPCID) 20 mg tablet, Take 1 tablet (20 mg total) by mouth daily, Disp: 30 tablet, Rfl: 3    finasteride (PROSCAR) 5 mg tablet, Take 1 tablet (5 mg total) by mouth every morning, Disp: 90 tablet, Rfl: 3    fluticasone (FLONASE) 50 mcg/act nasal spray, 2 sprays into each nostril daily, Disp: 16 g, Rfl: 5    formoterol (Perforomist) 20 MCG/2ML nebulizer solution, Take 1 vial (20 mcg total) by nebulization 2 (two) times a day, Disp: 60 vial, Rfl: 11    gabapentin (NEURONTIN) 300 mg capsule, Take 1 capsule (300 mg total) by mouth 3 (three) times a day, Disp: 90 capsule, Rfl: 6    guaiFENesin (ROBITUSSIN) 100 MG/5ML oral liquid, Take 200 mg by mouth 2 (two) times a day, Disp: , Rfl:     ipratropium-albuterol (DUO-NEB) 0 5-2 5 mg/3 mL nebulizer solution, take 1 vial (3ml) by nebulization four times a day as needed , Disp: 360 mL, Rfl: 0    LORazepam (ATIVAN) 1 mg tablet, TAKE ONE TABLET BY MOUTH TWICE DAILY , Disp: 60 tablet, Rfl: 3    midodrine (PROAMATINE) 2 5 mg tablet, Take 1 tablet (2 5 mg total) by mouth 3 (three) times a day, Disp: 90 tablet, Rfl: 11    OLANZapine (ZyPREXA) 5 mg tablet, Take 1 5 tablets (7 5 mg total) by mouth daily at bedtime, Disp: 45 tablet, Rfl: 2    OXYGEN-HELIUM IN, Inhale 2L at rest- 3L with activity, Disp: , Rfl:    pantoprazole (PROTONIX) 40 mg tablet, Take 1 tablet (40 mg total) by mouth 2 (two) times a day, Disp: 180 tablet, Rfl: 3    tamsulosin (FLOMAX) 0 4 mg, Half a tablet daily, Disp: 30 capsule, Rfl: 5    tamsulosin (FLOMAX) 0 4 mg, Take 0 4 mg by mouth daily, Disp: , Rfl:     traZODone (DESYREL) 100 mg tablet, Take 1 tablet (100 mg total) by mouth daily at bedtime, Disp: 30 tablet, Rfl: 2    Ventolin  (90 Base) MCG/ACT inhaler, Inhale 1 puff every 6 (six) hours as needed for shortness of breath, Disp: 1 Inhaler, Rfl: 11    ZEMAIRA infusion, once a week , Disp: , Rfl:     Review of Systems   Constitutional: Positive for activity change and fatigue  Negative for appetite change  Eyes: Negative for redness  Respiratory: Positive for shortness of breath (at rest, worse w/ exertion)  Cardiovascular: Negative for chest pain  Gastrointestinal: Positive for diarrhea  Negative for constipation, nausea and vomiting  Endocrine: Negative for polydipsia and polyphagia  Musculoskeletal: Positive for arthralgias and gait problem  Allergic/Immunologic: Positive for immunocompromised state  Neurological: Positive for weakness  Negative for facial asymmetry  Psychiatric/Behavioral: Positive for dysphoric mood (stable) and sleep disturbance  The patient is nervous/anxious (stable)  All other systems reviewed and are negative  OBJECTIVE:  /84   Pulse 72   Temp 97 5 °F (36 4 °C) (Tympanic)   Resp 20   Wt 87 7 kg (193 lb 6 4 oz)   BMI 22 64 kg/m²   Vital signs reviewed  Physical Exam:  Constitutional: Elderly, thin, chronically ill appearing  In no acute physical or emotional distress  Head: Normocephalic and atraumatic  Eyes: EOM are normal  No ocular discharge  No scleral icterus  Neck: No visible adenopathy or masses  Respiratory: Effort normal  No stridor  No respiratory distress  O2 from concentrator via NC at 3LPM   Gastrointestinal: No abdominal distension  Musculoskeletal: Ambulates with cane; slow gait  Mile dependent edema noted bilaterally  Neurological: Alert, oriented and appropriately conversant  No focal deficits  Follows commands appropriately  Skin: No diaphoresis, no rashes seen on exposed areas of skin  Psychiatric: Displays a normal mood and affect  Behavior, judgment and thought content appear normal      Lucy Miller MD  [facility] Palliative and Supportive Care      Portions of this document may have been created using dictation software and as such some "sound alike" terms may have been generated by the system  Do not hesitate to contact me with any questions or clarifications

## 2021-02-17 ENCOUNTER — EVALUATION (OUTPATIENT)
Dept: PHYSICAL THERAPY | Facility: CLINIC | Age: 76
End: 2021-02-17
Payer: MEDICARE

## 2021-02-17 DIAGNOSIS — R26.81 UNSTEADY GAIT: Primary | Chronic | ICD-10-CM

## 2021-02-17 DIAGNOSIS — Z51.5 PALLIATIVE CARE PATIENT: ICD-10-CM

## 2021-02-17 DIAGNOSIS — J43.8 OTHER EMPHYSEMA (HCC): ICD-10-CM

## 2021-02-17 PROCEDURE — 97163 PT EVAL HIGH COMPLEX 45 MIN: CPT | Performed by: PHYSICAL THERAPIST

## 2021-02-17 NOTE — PROGRESS NOTES
PT Evaluation     Today's date: 2021  Patient name: Danielle Nielsen  : 1945  MRN: [id number]  Referring provider: Kenneth La MD  Dx:   Encounter Diagnosis     ICD-10-CM    1  Unsteady gait  R26 81 Ambulatory referral to Physical Therapy   2  Palliative care patient  Z51 5 Ambulatory referral to Physical Therapy   3  Other emphysema ([address] )  J43 8        Start Time: 8712  Stop Time: 1024  Total time in clinic (min): 46 minutes    Assessment  Assessment details:   Danielle Nielsen is a 67 y o  male here today 21 for an initial physical therapy evaluation  Danielle Nielsen presents to PT with impairments in standing tolerance, activity tolerance, standing balance, functional endurance and functional strength  Danielle Nielsen has been educated in current PT POC and their personalized short term and long term goals were reviewed  His current 5XSTS score of 63 43 seconds suggests increased risk for falls compared to age matched norms  Based on 10MWT score of 0 23 m/s suggests limited household and community functional endurance  Client typically makes microwavable meals, has a cleaning lady and can do his own laundry  He lives alone and has a 1 level house with 1 step to enter through garage  His daughter is supportive  He currently has significant difficulty walking greater than 5 minutes, difficulty rising from chairs with BHR, and inability to rise from chairs without HR without physical assist by this PT (upon to min A), and difficulty standing greater than 1 minute  This PT discussed current benefits of use of RW versus SC at this time in order to reduce risk for falls as well as maximize endurance and he verbalized understanding  Continue to reinforce  He frequently gets short of breath, with baseline O2 on 3L at 95%  He is on a palliative care track  Plan to complete 2MWT next session    Danielle Nielsen would benefit from skilled outpatient physical therapy in order to address impairments listed above in order to maximize ability to return to life roles within home and community safely and independently  All questions addressed at this time  Thank you for this referral         Baseline Vitals: HR 81 bpm 95% O2 on 3L Елена 2          Impairments: abnormal gait, abnormal or restricted ROM, impaired balance, impaired physical strength and safety issue    Symptom irritability: moderateUnderstanding of Dx/Px/POC: excellent   Prognosis: good    Goals  Goals  STG 30 days    Client will complete mCTSIB  Client will complete 2 MWT   Client will achieve   14 m/sec improvement with score of  0 34 m/sec with 10 Meter Walk test, demonstrating Minimal Detectable change per current research standards for this objective test which assesses fall risk  Client will demonstrate a Timed up and Go score of 35 86 sec using most appropriate assisted device to reflect a statistically meaningful change in fall risk  Client will be able to complete a Five Timed sit to/from stand within 60 sec with no more than contact guard assistance to suggest reduced risk for falls      LT days   Client will demonstrate a gait speed of 0 4 m/s using most appropriate assisted device to reflect a  household ambulator  Client will be able to ambulate 100 feet using most appropriate AD during 2 minute walk test   Client will be able to rise from a chair without arm rests with only 1 UE support with mod I using most appropriate assisted device  Client will be able to walk from his living room to his bathroom at home without difficulties using most appropriate assisted device  Client will be able to improve static balance with feet together eyes open on foam surface to 15  to reduce risk for falls  Client will be able to complete a Timed up and Go within 23 sec to suggest reduced risk for falls    Client will be able to complete a Five Timed sit to/from stand within 40 sec to suggest reduced risk for falls    Cut off score   All date taken from APTA Neuro Section or Rehab Measures    MALDONADO test: 46/56                                                         5 x STS Test:  MDC: 6 points                                                              MDC: 2 3 seconds   age norms                                                                    Age Norms   61-76 year old = M: 54, F: 47                                         64-67 year old: 11 4 seconds   66-77 year old = M: 47,  F: 47                                         64-67 year old: 12 6 seconds    80-80 year old = M: 48,   F: 47                                         64-67 year old: 14 8 seconds     TUG test:                                                                     10 Meter Walk Test:  MDC: 4 14 seconds                                                      MDC:  14m/sec  Cut off score for Falls                                                  Age Norms  > 13 5 seconds community dwelling adults                20-29; M: 1 39 m/sec F: 1 41 m/sec  > 32 2 Frail Elderly                                                     30-39: M 1 46 m/sec  F: 1 42 m/sec                                                                                       40-49: M: 1 46 m/sec  F: 1 39 m/sec  6 Minute Walk Test                                                      50-59: M: 1 39 m/sec  F: 1 40 m/sec  MDC: 190 feet                                                              60-69: M: 1 36 m/sec  F: 1 30 m/sec  Age Norms                                                                   70-+    M: 1 33 m/sec  F: 1 27 m/sec  60-69:    M: 1876 F: 9864  29-48:    M: 1729 F: 1545  80-89 +: M: 7927 F; 1286     Plan  Patient would benefit from: PT eval and skilled physical therapy  Planned modality interventions: electrical stimulation/Russian stimulation and biofeedback  Planned therapy interventions: neuromuscular re-education, motor coordination training, therapeutic activities, therapeutic exercise, transfer training, gait training, patient education, strengthening, stretching, activity modification, balance, postural training, home exercise program, graded motor, graded exercise and graded activity  Frequency: 2x week  Duration in weeks: 12  Plan of Care beginning date: 2/17/2021  Plan of Care expiration date: 5/12/2021  Treatment plan discussed with: patient        Subjective Evaluation    History of Present Illness  Mechanism of injury: Mr Yajaira Angel was referred for skilled OPPT by Dr Michael Pandey due to worsening balance and gait as a result of progressive emphysema and COPD  He also has a history of PE  He currently received his 1st dose of the COVID vaccine and is supposed to receive his booster tomorrow, 2/18/21  Recurrent probem    Quality of life: fair    Pain  No pain reported    Social Support  Steps to enter house: yes  Stairs in house: no   Lives in: [address]  Lives with: alone    Employment status: not working  Hand dominance: right    Treatments  No previous or current treatments  Patient Goals  Patient goals for therapy: increased motion, improved balance, increased strength and independence with ADLs/IADLs          Objective     Functional Assessment        Comments  MMT:  R Hip flexion: 3-/5  R knee extension:3-/5  R ankle DF: 2+/5    L hip flexion: 2-/5  L knee extension: 3+/5  L ankle DF: 2/5    Limited by approx 10% of available range to B passive ankle DF  (+) B hamstring tightness: mild RLE, moderate LLE    Skin assessment:  (B) 2+ pitting edema with erythema to L shin  Precautions: On supplemental O2 (3L)   Palliative care    Outcome Measures 2/17/2021         5XSTS 63 43 sec, SC  BUE support CG-Min A  Елена 1  92% O2 3L         30 sec chair rise 2 with min A         TUG 40 sec SC Елена 3         10MWT 43 2 sec         2MWT Complete next session

## 2021-02-19 ENCOUNTER — TELEPHONE (OUTPATIENT)
Dept: PALLIATIVE MEDICINE | Facility: CLINIC | Age: 76
End: 2021-02-19

## 2021-02-19 NOTE — TELEPHONE ENCOUNTER
Would recommend increasing trazodone to 200mg at bedtime for a few nights and not using the Unisom, and letting us know how that works next week  If working we can update the prescription next week and send him an increased supply  Please also send a hard copy of the sleep hygiene information below  Thanks  Insomnia     What is insomnia? Insomnia is a condition that makes it hard to fall or stay asleep  Lack of sleep can lead to attention or memory problems during the day  You may also be damian, depressed, clumsy, or have headaches  What increases my risk for insomnia? · Older age  · Stress or worry  · A medical condition, such as sleep apnea, GERD, COPD, or asthma  · A mental health condition, such as depression or anxiety  · Blood pressure medicines or antidepressants  · Odd work schedules or frequent travel    How is insomnia diagnosed? Your healthcare provider will ask when your symptoms began and how often you cannot sleep  He will ask if you take any medicines that can cause insomnia, such as blood pressure medicine  He will ask if you have a medical condition, such as GERD, or a mental health condition, such as depression  He may also have you take a survey about your sleep  How is insomnia treated? · Cognitive behavioral therapy (CBT)  helps you find ways to relax, decrease stress, and improve sleep  · Medicines  may help you sleep more regularly or help you feel less anxious  Take them as directed  What can I do to improve my sleep? · Create a sleep schedule  This will help you form a sleep routine  Keep a record of your sleep patterns, and any sleeping problems you have  Bring the record to follow-up visits with healthcare providers  · Do not take naps  Naps could make it hard for you to fall asleep at bedtime  · Keep your bedroom cool, quiet, and dark  Turn on white noise, such as a fan, to help you relax  Do not use your bed for any activity that will keep you awake   Do not read, exercise, eat, or watch TV in your bedroom  · Get up if you do not fall asleep within 20 minutes  Move to another room and do something relaxing until you become sleepy  · Limit caffeine, alcohol, and food to earlier in the day  Only drink caffeine in the morning  Do not drink alcohol within 6 hours of bedtime  Do not eat a heavy meal right before you go to bed  · Exercise regularly  Daily exercise may help you sleep better  Do not exercise within 4 hours of bedtime  · Learn about your sleep patterns and habits by keeping a daily sleep diary  It may be key to helping you and your health care provider diagnose and treat a sleep disorder  Make up a chart with spaces for:  · The time you went to bed and woke up  · How long and well you slept  · When you were awake during the night  · How much caffeine or alcohol you consumed and when  · What/when you ate and drank  · What emotion or stress you had  · What drugs or medications you took  · Exercise you had during the day and what time

## 2021-02-19 NOTE — TELEPHONE ENCOUNTER
Pt called to report he has not been sleeping more than an hour for pastweek  "I watch the clock tick by every hour "  Pt denies sleeping durning the day  Current medications to assist with sleeping   Lorazepam 1 mg 2 x day Breakfast and HS  Trazadone  100 mg HS  Olanzapine 7 5 mg  HS    Unisom ( doxylamine succinate) half hour after Trazadone, Olanzapine  ( pt will take 2nd Unisom if not sleeping)    Instructed on alternative methods ie eye coverings, soft music or sound machine, avoid fluids or caffeine before bed  Denies pain  Instructed to try acetaminophen at [facility] for mild pain coverage  Pt verbalizes understanding of above  Asking for something stronger       Thank you

## 2021-02-22 ENCOUNTER — HOSPITAL ENCOUNTER (INPATIENT)
Facility: HOSPITAL | Age: 76
LOS: 1 days | Discharge: HOME | DRG: 948 | End: 2021-02-24
Attending: EMERGENCY MEDICINE | Admitting: FAMILY MEDICINE
Payer: MEDICARE

## 2021-02-22 DIAGNOSIS — R53.1 WEAKNESS: Primary | ICD-10-CM

## 2021-02-22 LAB
ALBUMIN SERPL BCP-MCNC: 3.6 G/DL (ref 3.5–5)
ALP SERPL-CCNC: 69 U/L (ref 46–116)
ALT SERPL W P-5'-P-CCNC: 35 U/L (ref 12–78)
ANION GAP SERPL CALCULATED.3IONS-SCNC: 7 MMOL/L (ref 4–13)
APTT PPP: 31 SECONDS (ref 23–37)
AST SERPL W P-5'-P-CCNC: 23 U/L (ref 5–45)
BASOPHILS # BLD AUTO: 0.05 THOUSANDS/ΜL (ref 0–0.1)
BASOPHILS NFR BLD AUTO: 1 % (ref 0–1)
BILIRUB SERPL-MCNC: 0.9 MG/DL (ref 0.2–1)
BUN SERPL-MCNC: 12 MG/DL (ref 5–25)
CALCIUM SERPL-MCNC: 9.5 MG/DL (ref 8.3–10.1)
CHLORIDE SERPL-SCNC: 104 MMOL/L (ref 100–108)
CK SERPL-CCNC: 72 U/L (ref 39–308)
CO2 SERPL-SCNC: 28 MMOL/L (ref 21–32)
CREAT SERPL-MCNC: 1.37 MG/DL (ref 0.6–1.3)
EOSINOPHIL # BLD AUTO: 0.12 THOUSAND/ΜL (ref 0–0.61)
EOSINOPHIL NFR BLD AUTO: 2 % (ref 0–6)
ERYTHROCYTE [DISTWIDTH] IN BLOOD BY AUTOMATED COUNT: 13.1 % (ref 11.6–15.1)
FLUAV RNA RESP QL NAA+PROBE: NEGATIVE
FLUBV RNA RESP QL NAA+PROBE: NEGATIVE
GFR SERPL CREATININE-BSD FRML MDRD: 49 ML/MIN/1.73SQ M
GLUCOSE SERPL-MCNC: 105 MG/DL (ref 65–140)
HCT VFR BLD AUTO: 39.3 % (ref 36.5–49.3)
HGB BLD-MCNC: 13.1 G/DL (ref 12–17)
IMM GRANULOCYTES # BLD AUTO: 0.03 THOUSAND/UL (ref 0–0.2)
IMM GRANULOCYTES NFR BLD AUTO: 1 % (ref 0–2)
INR PPP: 1.09 (ref 0.84–1.19)
LYMPHOCYTES # BLD AUTO: 1.4 THOUSANDS/ΜL (ref 0.6–4.47)
LYMPHOCYTES NFR BLD AUTO: 22 % (ref 14–44)
MCH RBC QN AUTO: 31.9 PG (ref 26.8–34.3)
MCHC RBC AUTO-ENTMCNC: 33.3 G/DL (ref 31.4–37.4)
MCV RBC AUTO: 96 FL (ref 82–98)
MONOCYTES # BLD AUTO: 0.58 THOUSAND/ΜL (ref 0.17–1.22)
MONOCYTES NFR BLD AUTO: 9 % (ref 4–12)
NEUTROPHILS # BLD AUTO: 4.27 THOUSANDS/ΜL (ref 1.85–7.62)
NEUTS SEG NFR BLD AUTO: 65 % (ref 43–75)
NRBC BLD AUTO-RTO: 0 /100 WBCS
PLATELET # BLD AUTO: 138 THOUSANDS/UL (ref 149–390)
PMV BLD AUTO: 8.9 FL (ref 8.9–12.7)
POTASSIUM SERPL-SCNC: 3.9 MMOL/L (ref 3.5–5.3)
PROT SERPL-MCNC: 6.7 G/DL (ref 6.4–8.2)
PROTHROMBIN TIME: 14 SECONDS (ref 11.6–14.5)
RBC # BLD AUTO: 4.11 MILLION/UL (ref 3.88–5.62)
RSV RNA RESP QL NAA+PROBE: NEGATIVE
SARS-COV-2 RNA RESP QL NAA+PROBE: NEGATIVE
SODIUM SERPL-SCNC: 139 MMOL/L (ref 136–145)
TROPONIN I SERPL-MCNC: <0.02 NG/ML
WBC # BLD AUTO: 6.45 THOUSAND/UL (ref 4.31–10.16)

## 2021-02-22 PROCEDURE — 93005 ELECTROCARDIOGRAM TRACING: CPT

## 2021-02-22 PROCEDURE — 82550 ASSAY OF CK (CPK): CPT | Performed by: EMERGENCY MEDICINE

## 2021-02-22 PROCEDURE — 80053 COMPREHEN METABOLIC PANEL: CPT | Performed by: EMERGENCY MEDICINE

## 2021-02-22 PROCEDURE — 99285 EMERGENCY DEPT VISIT HI MDM: CPT | Performed by: EMERGENCY MEDICINE

## 2021-02-22 PROCEDURE — 36415 COLL VENOUS BLD VENIPUNCTURE: CPT | Performed by: EMERGENCY MEDICINE

## 2021-02-22 PROCEDURE — 85730 THROMBOPLASTIN TIME PARTIAL: CPT | Performed by: EMERGENCY MEDICINE

## 2021-02-22 PROCEDURE — 85610 PROTHROMBIN TIME: CPT | Performed by: EMERGENCY MEDICINE

## 2021-02-22 PROCEDURE — 85025 COMPLETE CBC W/AUTO DIFF WBC: CPT | Performed by: EMERGENCY MEDICINE

## 2021-02-22 PROCEDURE — 84484 ASSAY OF TROPONIN QUANT: CPT | Performed by: EMERGENCY MEDICINE

## 2021-02-22 PROCEDURE — 96360 HYDRATION IV INFUSION INIT: CPT

## 2021-02-22 PROCEDURE — 99285 EMERGENCY DEPT VISIT HI MDM: CPT

## 2021-02-22 PROCEDURE — 0241U HB NFCT DS VIR RESP RNA 4 TRGT: CPT | Performed by: EMERGENCY MEDICINE

## 2021-02-22 RX ORDER — SODIUM CHLORIDE 9 MG/ML
125 INJECTION, SOLUTION INTRAVENOUS CONTINUOUS
Status: DISCONTINUED | OUTPATIENT
Start: 2021-02-22 | End: 2021-02-23

## 2021-02-22 RX ADMIN — SODIUM CHLORIDE 125 ML/HR: 0.9 INJECTION, SOLUTION INTRAVENOUS at 20:31

## 2021-02-22 NOTE — TELEPHONE ENCOUNTER
Late entry 02/19/21  Spoke to pt Friday late afternoon  Instructed on Dr Ward Caller  Orders  Will fU on Monday on outcome of changes over the weekend

## 2021-02-23 ENCOUNTER — APPOINTMENT (OUTPATIENT)
Dept: PHYSICAL THERAPY | Facility: CLINIC | Age: 76
End: 2021-02-23
Payer: MEDICARE

## 2021-02-23 ENCOUNTER — APPOINTMENT (OUTPATIENT)
Dept: RADIOLOGY | Facility: HOSPITAL | Age: 76
DRG: 948 | End: 2021-02-23
Payer: MEDICARE

## 2021-02-23 PROBLEM — M79.604 PAIN OF RIGHT LOWER EXTREMITY: Status: ACTIVE | Noted: 2021-02-23

## 2021-02-23 LAB
ANION GAP SERPL CALCULATED.3IONS-SCNC: 8 MMOL/L (ref 4–13)
BASOPHILS # BLD AUTO: 0.05 THOUSANDS/ΜL (ref 0–0.1)
BASOPHILS NFR BLD AUTO: 1 % (ref 0–1)
BUN SERPL-MCNC: 12 MG/DL (ref 5–25)
CALCIUM SERPL-MCNC: 8.6 MG/DL (ref 8.3–10.1)
CHLORIDE SERPL-SCNC: 106 MMOL/L (ref 100–108)
CO2 SERPL-SCNC: 26 MMOL/L (ref 21–32)
CREAT SERPL-MCNC: 1.28 MG/DL (ref 0.6–1.3)
EOSINOPHIL # BLD AUTO: 0.24 THOUSAND/ΜL (ref 0–0.61)
EOSINOPHIL NFR BLD AUTO: 4 % (ref 0–6)
ERYTHROCYTE [DISTWIDTH] IN BLOOD BY AUTOMATED COUNT: 13 % (ref 11.6–15.1)
GFR SERPL CREATININE-BSD FRML MDRD: 54 ML/MIN/1.73SQ M
GLUCOSE SERPL-MCNC: 104 MG/DL (ref 65–140)
HCT VFR BLD AUTO: 39 % (ref 36.5–49.3)
HGB BLD-MCNC: 12.6 G/DL (ref 12–17)
IMM GRANULOCYTES # BLD AUTO: 0.03 THOUSAND/UL (ref 0–0.2)
IMM GRANULOCYTES NFR BLD AUTO: 0 % (ref 0–2)
LYMPHOCYTES # BLD AUTO: 2.89 THOUSANDS/ΜL (ref 0.6–4.47)
LYMPHOCYTES NFR BLD AUTO: 42 % (ref 14–44)
MAGNESIUM SERPL-MCNC: 1.9 MG/DL (ref 1.6–2.6)
MCH RBC QN AUTO: 31.6 PG (ref 26.8–34.3)
MCHC RBC AUTO-ENTMCNC: 32.3 G/DL (ref 31.4–37.4)
MCV RBC AUTO: 98 FL (ref 82–98)
MONOCYTES # BLD AUTO: 0.68 THOUSAND/ΜL (ref 0.17–1.22)
MONOCYTES NFR BLD AUTO: 10 % (ref 4–12)
NEUTROPHILS # BLD AUTO: 2.97 THOUSANDS/ΜL (ref 1.85–7.62)
NEUTS SEG NFR BLD AUTO: 43 % (ref 43–75)
NRBC BLD AUTO-RTO: 0 /100 WBCS
PHOSPHATE SERPL-MCNC: 2.8 MG/DL (ref 2.3–4.1)
PLATELET # BLD AUTO: 110 THOUSANDS/UL (ref 149–390)
PLATELET # BLD AUTO: 121 THOUSANDS/UL (ref 149–390)
PMV BLD AUTO: 8.8 FL (ref 8.9–12.7)
PMV BLD AUTO: 9.4 FL (ref 8.9–12.7)
POTASSIUM SERPL-SCNC: 3.4 MMOL/L (ref 3.5–5.3)
RBC # BLD AUTO: 3.99 MILLION/UL (ref 3.88–5.62)
SODIUM SERPL-SCNC: 140 MMOL/L (ref 136–145)
WBC # BLD AUTO: 6.86 THOUSAND/UL (ref 4.31–10.16)

## 2021-02-23 PROCEDURE — 94760 N-INVAS EAR/PLS OXIMETRY 1: CPT

## 2021-02-23 PROCEDURE — 73502 X-RAY EXAM HIP UNI 2-3 VIEWS: CPT

## 2021-02-23 PROCEDURE — 83735 ASSAY OF MAGNESIUM: CPT | Performed by: STUDENT IN AN ORGANIZED HEALTH CARE EDUCATION/TRAINING PROGRAM

## 2021-02-23 PROCEDURE — NC001 PR NO CHARGE: Performed by: FAMILY MEDICINE

## 2021-02-23 PROCEDURE — 73564 X-RAY EXAM KNEE 4 OR MORE: CPT

## 2021-02-23 PROCEDURE — 80048 BASIC METABOLIC PNL TOTAL CA: CPT | Performed by: STUDENT IN AN ORGANIZED HEALTH CARE EDUCATION/TRAINING PROGRAM

## 2021-02-23 PROCEDURE — 85025 COMPLETE CBC W/AUTO DIFF WBC: CPT | Performed by: STUDENT IN AN ORGANIZED HEALTH CARE EDUCATION/TRAINING PROGRAM

## 2021-02-23 PROCEDURE — 85049 AUTOMATED PLATELET COUNT: CPT | Performed by: STUDENT IN AN ORGANIZED HEALTH CARE EDUCATION/TRAINING PROGRAM

## 2021-02-23 PROCEDURE — 84100 ASSAY OF PHOSPHORUS: CPT | Performed by: STUDENT IN AN ORGANIZED HEALTH CARE EDUCATION/TRAINING PROGRAM

## 2021-02-23 PROCEDURE — 99221 1ST HOSP IP/OBS SF/LOW 40: CPT | Performed by: FAMILY MEDICINE

## 2021-02-23 PROCEDURE — 94640 AIRWAY INHALATION TREATMENT: CPT

## 2021-02-23 PROCEDURE — 97530 THERAPEUTIC ACTIVITIES: CPT

## 2021-02-23 PROCEDURE — 97163 PT EVAL HIGH COMPLEX 45 MIN: CPT

## 2021-02-23 RX ORDER — TRAZODONE HYDROCHLORIDE 50 MG/1
100 TABLET ORAL
Status: DISCONTINUED | OUTPATIENT
Start: 2021-02-23 | End: 2021-02-24 | Stop reason: HOSPADM

## 2021-02-23 RX ORDER — OLANZAPINE 2.5 MG/1
7.5 TABLET ORAL
Status: DISCONTINUED | OUTPATIENT
Start: 2021-02-23 | End: 2021-02-24 | Stop reason: HOSPADM

## 2021-02-23 RX ORDER — LORAZEPAM 1 MG/1
1 TABLET ORAL 2 TIMES DAILY
Status: DISCONTINUED | OUTPATIENT
Start: 2021-02-23 | End: 2021-02-24 | Stop reason: HOSPADM

## 2021-02-23 RX ORDER — ACETAMINOPHEN 325 MG/1
1000 TABLET ORAL EVERY 8 HOURS SCHEDULED
Status: DISCONTINUED | OUTPATIENT
Start: 2021-02-23 | End: 2021-02-24 | Stop reason: HOSPADM

## 2021-02-23 RX ORDER — ALBUTEROL SULFATE 90 UG/1
1 AEROSOL, METERED RESPIRATORY (INHALATION) EVERY 6 HOURS PRN
Status: DISCONTINUED | OUTPATIENT
Start: 2021-02-23 | End: 2021-02-24 | Stop reason: HOSPADM

## 2021-02-23 RX ORDER — TAMSULOSIN HYDROCHLORIDE 0.4 MG/1
0.4 CAPSULE ORAL DAILY
Status: DISCONTINUED | OUTPATIENT
Start: 2021-02-23 | End: 2021-02-24 | Stop reason: HOSPADM

## 2021-02-23 RX ORDER — FAMOTIDINE 20 MG/1
20 TABLET, FILM COATED ORAL DAILY
Status: DISCONTINUED | OUTPATIENT
Start: 2021-02-23 | End: 2021-02-24 | Stop reason: HOSPADM

## 2021-02-23 RX ORDER — BUDESONIDE 0.5 MG/2ML
0.5 INHALANT ORAL
Status: DISCONTINUED | OUTPATIENT
Start: 2021-02-23 | End: 2021-02-24 | Stop reason: HOSPADM

## 2021-02-23 RX ORDER — CHOLESTYRAMINE LIGHT 4 G/5.7G
4 POWDER, FOR SUSPENSION ORAL 2 TIMES DAILY
Status: DISCONTINUED | OUTPATIENT
Start: 2021-02-23 | End: 2021-02-24 | Stop reason: HOSPADM

## 2021-02-23 RX ORDER — MIDODRINE HYDROCHLORIDE 5 MG/1
2.5 TABLET ORAL
Status: DISCONTINUED | OUTPATIENT
Start: 2021-02-23 | End: 2021-02-24 | Stop reason: HOSPADM

## 2021-02-23 RX ORDER — GABAPENTIN 300 MG/1
300 CAPSULE ORAL 3 TIMES DAILY
Status: DISCONTINUED | OUTPATIENT
Start: 2021-02-23 | End: 2021-02-24 | Stop reason: HOSPADM

## 2021-02-23 RX ORDER — AMLODIPINE BESYLATE 2.5 MG/1
2.5 TABLET ORAL DAILY
Status: DISCONTINUED | OUTPATIENT
Start: 2021-02-23 | End: 2021-02-23

## 2021-02-23 RX ORDER — BUSPIRONE HYDROCHLORIDE 10 MG/1
10 TABLET ORAL 3 TIMES DAILY
Status: DISCONTINUED | OUTPATIENT
Start: 2021-02-23 | End: 2021-02-24 | Stop reason: HOSPADM

## 2021-02-23 RX ORDER — GUAIFENESIN 100 MG/5ML
200 SOLUTION ORAL 2 TIMES DAILY
Status: DISCONTINUED | OUTPATIENT
Start: 2021-02-23 | End: 2021-02-23

## 2021-02-23 RX ORDER — PANTOPRAZOLE SODIUM 40 MG/1
40 TABLET, DELAYED RELEASE ORAL
Status: DISCONTINUED | OUTPATIENT
Start: 2021-02-23 | End: 2021-02-24 | Stop reason: HOSPADM

## 2021-02-23 RX ORDER — FORMOTEROL FUMARATE 20 UG/2ML
20 SOLUTION RESPIRATORY (INHALATION)
Status: DISCONTINUED | OUTPATIENT
Start: 2021-02-23 | End: 2021-02-24 | Stop reason: HOSPADM

## 2021-02-23 RX ORDER — FINASTERIDE 5 MG/1
5 TABLET, FILM COATED ORAL EVERY MORNING
Status: DISCONTINUED | OUTPATIENT
Start: 2021-02-23 | End: 2021-02-24 | Stop reason: HOSPADM

## 2021-02-23 RX ORDER — IPRATROPIUM BROMIDE AND ALBUTEROL SULFATE 2.5; .5 MG/3ML; MG/3ML
3 SOLUTION RESPIRATORY (INHALATION) EVERY 4 HOURS PRN
Status: DISCONTINUED | OUTPATIENT
Start: 2021-02-23 | End: 2021-02-24 | Stop reason: HOSPADM

## 2021-02-23 RX ORDER — FLUTICASONE PROPIONATE 50 MCG
2 SPRAY, SUSPENSION (ML) NASAL DAILY
Status: DISCONTINUED | OUTPATIENT
Start: 2021-02-23 | End: 2021-02-24 | Stop reason: HOSPADM

## 2021-02-23 RX ADMIN — BUSPIRONE HYDROCHLORIDE 10 MG: 10 TABLET ORAL at 09:34

## 2021-02-23 RX ADMIN — GABAPENTIN 300 MG: 300 CAPSULE ORAL at 22:08

## 2021-02-23 RX ADMIN — FLUTICASONE PROPIONATE 2 SPRAY: 50 SPRAY, METERED NASAL at 09:34

## 2021-02-23 RX ADMIN — BUDESONIDE 0.5 MG: 0.5 INHALANT ORAL at 21:32

## 2021-02-23 RX ADMIN — ACETAMINOPHEN 975 MG: 325 TABLET, FILM COATED ORAL at 13:39

## 2021-02-23 RX ADMIN — PANTOPRAZOLE SODIUM 40 MG: 40 TABLET, DELAYED RELEASE ORAL at 05:20

## 2021-02-23 RX ADMIN — FAMOTIDINE 20 MG: 20 TABLET, FILM COATED ORAL at 09:34

## 2021-02-23 RX ADMIN — BUSPIRONE HYDROCHLORIDE 10 MG: 10 TABLET ORAL at 17:47

## 2021-02-23 RX ADMIN — DICLOFENAC SODIUM 2 G: 10 GEL TOPICAL at 22:14

## 2021-02-23 RX ADMIN — CHOLESTYRAMINE 4 G: 4 POWDER, FOR SUSPENSION ORAL at 17:47

## 2021-02-23 RX ADMIN — LORAZEPAM 1 MG: 1 TABLET ORAL at 17:47

## 2021-02-23 RX ADMIN — MIDODRINE HYDROCHLORIDE 2.5 MG: 5 TABLET ORAL at 09:34

## 2021-02-23 RX ADMIN — FINASTERIDE 5 MG: 5 TABLET, FILM COATED ORAL at 09:34

## 2021-02-23 RX ADMIN — FORMOTEROL FUMARATE DIHYDRATE 20 MCG: 20 SOLUTION RESPIRATORY (INHALATION) at 07:47

## 2021-02-23 RX ADMIN — DICLOFENAC SODIUM 2 G: 10 GEL TOPICAL at 17:47

## 2021-02-23 RX ADMIN — BUSPIRONE HYDROCHLORIDE 10 MG: 10 TABLET ORAL at 22:08

## 2021-02-23 RX ADMIN — MIDODRINE HYDROCHLORIDE 2.5 MG: 5 TABLET ORAL at 13:39

## 2021-02-23 RX ADMIN — BUDESONIDE 0.5 MG: 0.5 INHALANT ORAL at 07:46

## 2021-02-23 RX ADMIN — OLANZAPINE 7.5 MG: 2.5 TABLET, FILM COATED ORAL at 22:08

## 2021-02-23 RX ADMIN — LORAZEPAM 1 MG: 1 TABLET ORAL at 09:34

## 2021-02-23 RX ADMIN — BUSPIRONE HYDROCHLORIDE 10 MG: 10 TABLET ORAL at 00:50

## 2021-02-23 RX ADMIN — DICLOFENAC SODIUM 2 G: 10 GEL TOPICAL at 13:38

## 2021-02-23 RX ADMIN — TAMSULOSIN HYDROCHLORIDE 0.4 MG: 0.4 CAPSULE ORAL at 09:34

## 2021-02-23 RX ADMIN — ENOXAPARIN SODIUM 40 MG: 40 INJECTION SUBCUTANEOUS at 09:34

## 2021-02-23 RX ADMIN — DICLOFENAC SODIUM 2 G: 10 GEL TOPICAL at 09:34

## 2021-02-23 RX ADMIN — MIDODRINE HYDROCHLORIDE 2.5 MG: 5 TABLET ORAL at 17:47

## 2021-02-23 RX ADMIN — ACETAMINOPHEN 975 MG: 325 TABLET, FILM COATED ORAL at 22:07

## 2021-02-23 RX ADMIN — OLANZAPINE 7.5 MG: 2.5 TABLET, FILM COATED ORAL at 00:51

## 2021-02-23 RX ADMIN — TRAZODONE HYDROCHLORIDE 100 MG: 50 TABLET ORAL at 00:50

## 2021-02-23 RX ADMIN — ACETAMINOPHEN 975 MG: 325 TABLET, FILM COATED ORAL at 05:20

## 2021-02-23 RX ADMIN — SODIUM CHLORIDE 125 ML/HR: 0.9 INJECTION, SOLUTION INTRAVENOUS at 05:22

## 2021-02-23 RX ADMIN — GABAPENTIN 300 MG: 300 CAPSULE ORAL at 09:34

## 2021-02-23 RX ADMIN — TRAZODONE HYDROCHLORIDE 100 MG: 50 TABLET ORAL at 22:08

## 2021-02-23 RX ADMIN — CHOLESTYRAMINE 4 G: 4 POWDER, FOR SUSPENSION ORAL at 09:34

## 2021-02-23 RX ADMIN — PANTOPRAZOLE SODIUM 40 MG: 40 TABLET, DELAYED RELEASE ORAL at 17:47

## 2021-02-23 RX ADMIN — FORMOTEROL FUMARATE DIHYDRATE 20 MCG: 20 SOLUTION RESPIRATORY (INHALATION) at 23:10

## 2021-02-23 RX ADMIN — GABAPENTIN 300 MG: 300 CAPSULE ORAL at 00:50

## 2021-02-23 RX ADMIN — GABAPENTIN 300 MG: 300 CAPSULE ORAL at 17:47

## 2021-02-23 NOTE — ED PROVIDER NOTES
History  Chief Complaint   Patient presents with    Fall     pt brought in by EMS after b/l leg weakness and "they gave out" pt fell denies hitting head, no LOC, no thinners  pt c/o b/l upper leg pain     59-year-old male brought in by EMS after he states his legs got so weak he they gave out on him and he helped himself to the floor  Patient denies any injury Erin fall  States he did not hit his head did not fall quickly he lowered himself to the floor patient states he did lie on the floor for approximately 1 5 hours  He was awake alert the whole time  States he did not have the strength to get up patient has recently been receiving palliative care  He is end-stage COPD  History provided by:  Patient   used: No        Prior to Admission Medications   Prescriptions Last Dose Informant Patient Reported? Taking?    Diclofenac Sodium (VOLTAREN) 1 % 2/22/2021 at Unknown time  No Yes   Sig: Apply 2 g topically 4 (four) times a day Apply to R knee   LORazepam (ATIVAN) 1 mg tablet 2/22/2021 at Unknown time Self No Yes   Sig: TAKE ONE TABLET BY MOUTH TWICE DAILY    OLANZapine (ZyPREXA) 5 mg tablet 2/22/2021 at Unknown time  No Yes   Sig: Take 1 5 tablets (7 5 mg total) by mouth daily at bedtime   OXYGEN-HELIUM IN 2/22/2021 at Unknown time Self Yes Yes   Sig: Inhale 2L at rest- 3L with activity   Ventolin  (90 Base) MCG/ACT inhaler Past Week at Unknown time Self No Yes   Sig: Inhale 1 puff every 6 (six) hours as needed for shortness of breath   ZEMAIRA infusion Past Week at Unknown time Self Yes Yes   Sig: once a week    acetaminophen (TYLENOL) 500 mg tablet 2/22/2021 at Unknown time  No Yes   Sig: Take 2 tablets (1,000 mg total) by mouth every 8 (eight) hours   amLODIPine (NORVASC) 2 5 mg tablet 2/22/2021 at Unknown time Self No Yes   Sig: Take 1 tablet (2 5 mg total) by mouth daily   budesonide (PULMICORT) 0 5 mg/2 mL nebulizer solution 2/22/2021 at Unknown time Self No Yes   Sig: Take 1 vial (0 5 mg total) by nebulization 2 (two) times a day Rinse mouth after use     busPIRone (BUSPAR) 10 mg tablet 2021 at Unknown time Self No Yes   Sig: Take 1 tablet (10 mg total) by mouth 3 (three) times a day   cholestyramine sugar free (QUESTRAN LIGHT) 4 g packet 2021 at Unknown time  No Yes   Sig: Take 1 packet (4 g total) by mouth 2 (two) times a day   doxylamine (Unisom SleepTabs) 25 MG tablet 2021 at Unknown time Self Yes Yes   Sig: Take 25 mg by mouth daily at bedtime as needed for sleep   famotidine (PEPCID) 20 mg tablet 2021 at Unknown time Self No Yes   Sig: Take 1 tablet (20 mg total) by mouth daily   finasteride (PROSCAR) 5 mg tablet 2021 at Unknown time Self No Yes   Sig: Take 1 tablet (5 mg total) by mouth every morning   fluticasone (FLONASE) 50 mcg/act nasal spray  Self No No   Si sprays into each nostril daily   formoterol (Perforomist) 20 MCG/2ML nebulizer solution 2021 at Unknown time Self No Yes   Sig: Take 1 vial (20 mcg total) by nebulization 2 (two) times a day   gabapentin (NEURONTIN) 300 mg capsule 2021 at Unknown time Self No Yes   Sig: Take 1 capsule (300 mg total) by mouth 3 (three) times a day   guaiFENesin (ROBITUSSIN) 100 MG/5ML oral liquid Not Taking at Unknown time Self Yes No   Sig: Take 200 mg by mouth 2 (two) times a day   ipratropium-albuterol (DUO-NEB) 0 5-2 5 mg/3 mL nebulizer solution Unknown at Unknown time  No No   Sig: take 1 vial (3ml) by nebulization four times a day as needed    midodrine (PROAMATINE) 2 5 mg tablet 2021 at Unknown time Self No Yes   Sig: Take 1 tablet (2 5 mg total) by mouth 3 (three) times a day   pantoprazole (PROTONIX) 40 mg tablet 2021 at Unknown time Self No Yes   Sig: Take 1 tablet (40 mg total) by mouth 2 (two) times a day   tamsulosin (FLOMAX) 0 4 mg 2021 at Unknown time Self No Yes   Sig: Half a tablet daily   tamsulosin (FLOMAX) 0 4 mg  Self Yes No   Sig: Take 0 4 mg by mouth daily traZODone (DESYREL) 100 mg tablet 2/22/2021 at Unknown time  No Yes   Sig: Take 1 tablet (100 mg total) by mouth daily at bedtime      Facility-Administered Medications: None       Past Medical History:   Diagnosis Date    Anesthesia complication     Difficult to wake up    Arthritis     BPH (benign prostatic hyperplasia)     urinary frequency    Cancer (HCC)     basal cell neck, face    COPD exacerbation ([facility] [address] ) 12/29/2019    Full dentures     Hiatal hernia     History of methicillin resistant staphylococcus aureus (MRSA)     10/11/2018 MRSA (nares) positive    Irritable bowel syndrome     Kidney stone     at least 7 episodes    Liver disease     Alpha 1- enzyme deficiency - diagnosed 2002  has been on weekly replacement therapy since then    [facility]     1/25/15  FEV1 - 2 45 liters or 59% of predicted    RSV infection 12/2017    Wears glasses     for driving only       Past Surgical History:   Procedure Laterality Date    BACK SURGERY  2008    discectomy    COLONOSCOPY      COLONOSCOPY N/A 3/10/2017    Procedure: Shiela Marie;  Surgeon: Rashi Florian MD;  Location: [facility] GI LAB; Service:    Ammon Brink      removal of kidney stones    ESOPHAGOGASTRODUODENOSCOPY N/A 3/10/2017    Procedure: ESOPHAGOGASTRODUODENOSCOPY (EGD); Surgeon: Rashi Florian MD;  Location: [facility] GI LAB; Service:     LITHOTRIPSY      TX ESOPHAGOGASTRODUODENOSCOPY TRANSORAL DIAGNOSTIC N/A 11/20/2018    Procedure: ESOPHAGOGASTRODUODENOSCOPY (EGD); Surgeon: Rashi Florian MD;  Location: [facility] GI LAB;   Service: Gastroenterology    TONSILLECTOMY      VEIN LIGATION AND STRIPPING Bilateral     18's       Family History   Problem Relation Age of Onset    Emphysema Mother         never smoked    Emphysema Father     Cancer Brother         colon    Colon cancer Brother     Ulcerative colitis Family     Liver disease Family      I have reviewed and agree with the history as documented  E-Cigarette/Vaping    E-Cigarette Use Never User      E-Cigarette/Vaping Substances    Nicotine No     THC No     CBD No     Flavoring No     Other No     Unknown No      Social History     Tobacco Use    Smoking status: Former Smoker     Packs/day: 1 00     Years: 60 00     Pack years: 60 00     Quit date: 8/31/2017     Years since quitting: 3 4    Smokeless tobacco: Never Used    Tobacco comment: quit in august 2017   Substance Use Topics    Alcohol use: Not Currently     Frequency: Never     Comment: stopped in 2009    Drug use: Not Currently       Review of Systems   Constitutional: Negative for activity change, chills, diaphoresis and fever  HENT: Negative for congestion, ear pain, nosebleeds, sore throat, trouble swallowing and voice change  Eyes: Negative for pain, discharge and redness  Respiratory: Negative for apnea, cough, choking, shortness of breath, wheezing and stridor  Cardiovascular: Negative for chest pain and palpitations  Gastrointestinal: Negative for abdominal distention, abdominal pain, constipation, diarrhea, nausea and vomiting  Endocrine: Negative for polydipsia  Genitourinary: Negative for difficulty urinating, dysuria, flank pain, frequency, hematuria and urgency  Musculoskeletal: Negative for back pain, gait problem, joint swelling, myalgias, neck pain and neck stiffness  Skin: Positive for wound  Negative for pallor and rash  Neurological: Negative for dizziness, tremors, syncope, speech difficulty, weakness, numbness and headaches  Hematological: Negative for adenopathy  Psychiatric/Behavioral: Negative for confusion, hallucinations, self-injury and suicidal ideas  The patient is not nervous/anxious  Physical Exam  Physical Exam  Vitals signs and nursing note reviewed  Constitutional:       General: He is not in acute distress  Appearance: He is well-developed  He is not diaphoretic     HENT:      Head: Normocephalic and atraumatic  Right Ear: External ear normal       Left Ear: External ear normal       Nose: Nose normal    Eyes:      Conjunctiva/sclera: Conjunctivae normal       Pupils: Pupils are equal, round, and reactive to light  Neck:      Musculoskeletal: Normal range of motion and neck supple  Cardiovascular:      Rate and Rhythm: Normal rate and regular rhythm  Heart sounds: Normal heart sounds  Pulmonary:      Effort: Pulmonary effort is normal       Breath sounds: Normal breath sounds  Abdominal:      General: Bowel sounds are normal       Palpations: Abdomen is soft  Musculoskeletal: Normal range of motion  Comments: Patient does have weakness he is unable to sit up on his own and he also is unable to lift his legs off the stretcher  Skin:     General: Skin is warm and dry  Neurological:      Mental Status: He is alert and oriented to person, place, and time  Deep Tendon Reflexes: Reflexes are normal and symmetric  Psychiatric:         Behavior: Behavior is cooperative           Vital Signs  ED Triage Vitals [02/22/21 1957]   Temperature Pulse Respirations Blood Pressure SpO2   98 °F (36 7 °C) 82 18 140/69 95 %      Temp Source Heart Rate Source Patient Position - Orthostatic VS BP Location FiO2 (%)   Tympanic Monitor Lying Left arm --      Pain Score       3           Vitals:    02/23/21 1505 02/23/21 1926 02/23/21 2336 02/24/21 0745   BP: 164/86 163/86 132/66 164/86   Pulse: 65 70 62 66   Patient Position - Orthostatic VS:             Visual Acuity      ED Medications  Medications   acetaminophen (TYLENOL) tablet 975 mg (975 mg Oral Given 2/24/21 0654)   budesonide (PULMICORT) inhalation solution 0 5 mg (0 5 mg Nebulization Given 2/24/21 0732)   busPIRone (BUSPAR) tablet 10 mg (10 mg Oral Given 2/24/21 0912)   cholestyramine sugar free (QUESTRAN LIGHT) packet 4 g (4 g Oral Given 2/24/21 0912)   doxylamine (UNISON) tablet 25 mg (has no administration in time range)   famotidine (PEPCID) tablet 20 mg (20 mg Oral Given 2/24/21 0911)   finasteride (PROSCAR) tablet 5 mg (5 mg Oral Given 2/24/21 0911)   Diclofenac Sodium (VOLTAREN) 1 % topical gel 2 g (2 g Topical Given 2/24/21 0909)   fluticasone (FLONASE) 50 mcg/act nasal spray 2 spray (2 sprays Nasal Given 2/24/21 0909)   formoterol (PERFOROMIST) nebulizer solution 20 mcg (20 mcg Nebulization Given 2/24/21 0732)   gabapentin (NEURONTIN) capsule 300 mg (300 mg Oral Given 2/24/21 0912)   ipratropium-albuterol (DUO-NEB) 0 5-2 5 mg/3 mL inhalation solution 3 mL (has no administration in time range)   LORazepam (ATIVAN) tablet 1 mg (1 mg Oral Given 2/24/21 0909)   OLANZapine (ZyPREXA) tablet 7 5 mg (7 5 mg Oral Given 2/23/21 2208)   midodrine (PROAMATINE) tablet 2 5 mg (2 5 mg Oral Given 2/24/21 0910)   pantoprazole (PROTONIX) EC tablet 40 mg (40 mg Oral Given 2/24/21 0655)   tamsulosin (FLOMAX) capsule 0 4 mg (0 4 mg Oral Given 2/23/21 0934)   traZODone (DESYREL) tablet 100 mg (100 mg Oral Given 2/23/21 2208)   albuterol (PROVENTIL HFA,VENTOLIN HFA) inhaler 1 puff (has no administration in time range)   enoxaparin (LOVENOX) subcutaneous injection 40 mg (40 mg Subcutaneous Given 2/24/21 0912)       Diagnostic Studies  Results Reviewed     Procedure Component Value Units Date/Time    COVID19, Influenza A/B, RSV PCR, [facility] [[id number]]  (Normal) Collected: 02/22/21 2217    Lab Status: Final result Specimen: Nares from Nasopharyngeal Swab Updated: 02/22/21 2303     SARS-CoV-2 Negative     INFLUENZA A PCR Negative     INFLUENZA B PCR Negative     RSV PCR Negative    Narrative: This test has been authorized by FDA under an EUA (Emergency Use Assay) for use by authorized laboratories  Clinical caution and judgement should be used with the interpretation of these results with consideration of the clinical impression and other laboratory testing    Testing reported as "Positive" or "Negative" has been proven to be accurate according to standard laboratory validation requirements  All testing is performed with control materials showing appropriate reactivity at standard intervals      Troponin I [[id number]]  (Normal) Collected: 02/22/21 2028    Lab Status: Final result Specimen: Blood from Arm, Right Updated: 02/22/21 2056     Troponin I <0 02 ng/mL     Comprehensive metabolic panel [[id number]]  (Abnormal) Collected: 02/22/21 2028    Lab Status: Final result Specimen: Blood from Arm, Right Updated: 02/22/21 2056     Sodium 139 mmol/L      Potassium 3 9 mmol/L      Chloride 104 mmol/L      CO2 28 mmol/L      ANION GAP 7 mmol/L      BUN 12 mg/dL      Creatinine 1 37 mg/dL      Glucose 105 mg/dL      Calcium 9 5 mg/dL      AST 23 U/L      ALT 35 U/L      Alkaline Phosphatase 69 U/L      Total Protein 6 7 g/dL      Albumin 3 6 g/dL      Total Bilirubin 0 90 mg/dL      eGFR 49 ml/min/1 73sq m     Narrative:      [place] guidelines for Chronic Kidney Disease (CKD):     Stage 1 with normal or high GFR (GFR > 90 mL/min/1 73 square meters)    Stage 2 Mild CKD (GFR = 60-89 mL/min/1 73 square meters)    Stage 3A Moderate CKD (GFR = 45-59 mL/min/1 73 square meters)    Stage 3B Moderate CKD (GFR = 30-44 mL/min/1 73 square meters)    Stage 4 Severe CKD (GFR = 15-29 mL/min/1 73 square meters)    Stage 5 End Stage CKD (GFR <15 mL/min/1 73 square meters)  Note: GFR calculation is accurate only with a steady state creatinine    CK (with reflex to MB) [[id number]]  (Normal) Collected: 02/22/21 2028    Lab Status: Final result Specimen: Blood from Arm, Right Updated: 02/22/21 2056     Total CK 72 U/L     Protime-INR [[id number]]  (Normal) Collected: 02/22/21 2028    Lab Status: Final result Specimen: Blood from Arm, Right Updated: 02/22/21 2049     Protime 14 0 seconds      INR 1 09    APTT [[id number]]  (Normal) Collected: 02/22/21 2028    Lab Status: Final result Specimen: Blood from Arm, Right Updated: 02/22/21 2049     PTT 31 seconds     CBC and differential [[id number]]  (Abnormal) Collected: 02/22/21 2028    Lab Status: Final result Specimen: Blood from Arm, Right Updated: 02/22/21 2039     WBC 6 45 Thousand/uL      RBC 4 11 Million/uL      Hemoglobin 13 1 g/dL      Hematocrit 39 3 %      MCV 96 fL      MCH 31 9 pg      MCHC 33 3 g/dL      RDW 13 1 %      MPV 8 9 fL      Platelets 843 Thousands/uL      nRBC 0 /100 WBCs      Neutrophils Relative 65 %      Immat GRANS % 1 %      Lymphocytes Relative 22 %      Monocytes Relative 9 %      Eosinophils Relative 2 %      Basophils Relative 1 %      Neutrophils Absolute 4 27 Thousands/µL      Immature Grans Absolute 0 03 Thousand/uL      Lymphocytes Absolute 1 40 Thousands/µL      Monocytes Absolute 0 58 Thousand/µL      Eosinophils Absolute 0 12 Thousand/µL      Basophils Absolute 0 05 Thousands/µL     UA (URINE) with reflex to Scope [[id number]]     Lab Status: No result Specimen: Urine                  XR hip/pelv 2-3 vws right if performed    (Results Pending)   XR knee 4+ vw right injury    (Results Pending)              Procedures  Procedures         ED Course                                           MDM    Disposition  Final diagnoses:   Weakness     Time reflects when diagnosis was documented in both MDM as applicable and the Disposition within this note     Time User Action Codes Description Comment    2/22/2021  9:52 PM Carmen Alfredoor Add [R53 1] Weakness       ED Disposition     ED Disposition Condition Date/Time Comment    Admit Stable Mon Feb 22, 2021  9:52 PM Case was discussed with Dr Marilynn Gordon and the patient's admission status was agreed to be Admission Status: observation status to the service of Dr Reginaldo Morales           Follow-up Information    None         Current Discharge Medication List      CONTINUE these medications which have NOT CHANGED    Details   acetaminophen (TYLENOL) 500 mg tablet Take 2 tablets (1,000 mg total) by mouth every 8 (eight) hours  Qty: 90 tablet, Refills: 3 Associated Diagnoses: Palliative care patient; Arthritis      amLODIPine (NORVASC) 2 5 mg tablet Take 1 tablet (2 5 mg total) by mouth daily  Qty: 90 tablet, Refills: 3    Associated Diagnoses: Essential hypertension      budesonide (PULMICORT) 0 5 mg/2 mL nebulizer solution Take 1 vial (0 5 mg total) by nebulization 2 (two) times a day Rinse mouth after use  Qty: 60 vial, Refills: 11    Associated Diagnoses: Centrilobular emphysema (HCC)      busPIRone (BUSPAR) 10 mg tablet Take 1 tablet (10 mg total) by mouth 3 (three) times a day  Qty: 90 tablet, Refills: 2    Associated Diagnoses: Chronic respiratory failure with hypoxia (HCC)      cholestyramine sugar free (QUESTRAN LIGHT) 4 g packet Take 1 packet (4 g total) by mouth 2 (two) times a day  Qty: 60 packet, Refills: 1    Associated Diagnoses: AAT (alpha-1-antitrypsin) deficiency ([facility] [address] ); Diarrhea, unspecified type      Diclofenac Sodium (VOLTAREN) 1 % Apply 2 g topically 4 (four) times a day Apply to R knee  Qty: 50 g, Refills: 0    Associated Diagnoses: Palliative care patient;  Arthritis      doxylamine (Unisom SleepTabs) 25 MG tablet Take 25 mg by mouth daily at bedtime as needed for sleep      famotidine (PEPCID) 20 mg tablet Take 1 tablet (20 mg total) by mouth daily  Qty: 30 tablet, Refills: 3    Associated Diagnoses: Gastroesophageal reflux disease with esophagitis      finasteride (PROSCAR) 5 mg tablet Take 1 tablet (5 mg total) by mouth every morning  Qty: 90 tablet, Refills: 3    Associated Diagnoses: Benign prostatic hyperplasia with urinary obstruction      formoterol (Perforomist) 20 MCG/2ML nebulizer solution Take 1 vial (20 mcg total) by nebulization 2 (two) times a day  Qty: 60 vial, Refills: 11    Associated Diagnoses: Centrilobular emphysema (HCC)      gabapentin (NEURONTIN) 300 mg capsule Take 1 capsule (300 mg total) by mouth 3 (three) times a day  Qty: 90 capsule, Refills: 6    Associated Diagnoses: Neuropathy      LORazepam (ATIVAN) 1 mg tablet TAKE ONE TABLET BY MOUTH TWICE DAILY   Qty: 60 tablet, Refills: 3    Associated Diagnoses: Anxiety      midodrine (PROAMATINE) 2 5 mg tablet Take 1 tablet (2 5 mg total) by mouth 3 (three) times a day  Qty: 90 tablet, Refills: 11    Associated Diagnoses: Orthostatic hypertension      OLANZapine (ZyPREXA) 5 mg tablet Take 1 5 tablets (7 5 mg total) by mouth daily at bedtime  Qty: 45 tablet, Refills: 2    Associated Diagnoses: Insomnia, unspecified type; Palliative care patient; Dysphoric mood      OXYGEN-HELIUM IN Inhale 2L at rest- 3L with activity      pantoprazole (PROTONIX) 40 mg tablet Take 1 tablet (40 mg total) by mouth 2 (two) times a day  Qty: 180 tablet, Refills: 3    Associated Diagnoses: Gastroesophageal reflux disease without esophagitis      !! tamsulosin (FLOMAX) 0 4 mg Half a tablet daily  Qty: 30 capsule, Refills: 5    Associated Diagnoses: BPH (benign prostatic hyperplasia)      traZODone (DESYREL) 100 mg tablet Take 1 tablet (100 mg total) by mouth daily at bedtime  Qty: 30 tablet, Refills: 2    Associated Diagnoses: Palliative care patient; Insomnia, unspecified type      Ventolin  (90 Base) MCG/ACT inhaler Inhale 1 puff every 6 (six) hours as needed for shortness of breath  Qty: 1 Inhaler, Refills: 11    Comments: Substitution to a formulary equivalent within the same pharmaceutical class is authorized  Associated Diagnoses: Centrilobular emphysema ([address] )      ZEMAIRA infusion once a week       fluticasone (FLONASE) 50 mcg/act nasal spray 2 sprays into each nostril daily  Qty: 16 g, Refills: 5    Associated Diagnoses: Rhinitis, unspecified type      guaiFENesin (ROBITUSSIN) 100 MG/5ML oral liquid Take 200 mg by mouth 2 (two) times a day      ipratropium-albuterol (DUO-NEB) 0 5-2 5 mg/3 mL nebulizer solution take 1 vial (3ml) by nebulization four times a day as needed   Qty: 360 mL, Refills: 0    Associated Diagnoses: COPD exacerbation ([address] )      ! ! tamsulosin (FLOMAX) 0 4 mg Take 0 4 mg by mouth daily       ! ! - Potential duplicate medications found  Please discuss with provider  No discharge procedures on file      PDMP Review       Value Time User    PDMP Reviewed  Yes 2/9/2021 10:51 AM Fernando Hinton MD          ED Provider  Electronically Signed by           Ashley Jurado DO  02/24/21 102

## 2021-02-23 NOTE — PHYSICAL THERAPY NOTE
PT EVALUATION       02/23/21 1040   Note Type   Note type Evaluation   Pain Assessment   Pain Assessment Tool Pain Assessment not indicated - pt denies pain   Home Living   Type of [address] One level  (2 TEMI)   Home Equipment Cane;Walker  (69)   Prior Function   Level of [place] Independent with ADLs and functional mobility  (ambulates with cane, walker, or no AD)   Lives With Alone   Receives Help From Family   ADL Assistance Independent   Falls in the last 6 months   (multiple)   Restrictions/Precautions   Other Precautions Fall Risk;O2   General   Additional Pertinent History Pt admitted wtih generalized weakness  Pt had just started OP PT for imbalance last week  Cognition   Overall Cognitive Status WFL   RLE Assessment   RLE Assessment   (ROM WFL, MMT 4/5)   LLE Assessment   LLE Assessment   (ROM WFL, MMT 4/5)   Bed Mobility   Supine to Sit 5  Supervision   Transfers   Sit to Stand 4  Minimal assistance   Stand to Sit 4  Minimal assistance   Stand pivot 4  Minimal assistance   Additional items   (with walker)   Ambulation/Elevation   Gait pattern   (slow brad, flexed posture)   Gait Assistance 4  Minimal assist   Assistive Device Rolling walker  (3L02) Sp02 >90% with activity on 3L02   Distance 25 feet   Balance   Static Sitting Good   Dynamic Sitting Fair +   Static Standing Fair +   Dynamic Standing Fair   Ambulatory Fair   Activity Tolerance   Activity Tolerance Patient limited by fatigue;Patient tolerated treatment well   Assessment   Prognosis Good   Problem List Decreased strength;Decreased endurance; Impaired balance;Decreased mobility   Assessment Patient seen for Physical Therapy evaluation  Patient admitted with Weakness generalized  Comorbidities affecting patient's physical performance include: COPD, ambulatory dysfunction, syncope, peripheral neuropathy    Personal factors affecting patient at time of initial evaluation include: ambulating with assistive device, stairs to enter home and inability to navigate level surfaces without external assistance  Prior to admission, patient was independent with functional mobility with walker or cane and 02 and independent with ADLS  Please find objective findings from Physical Therapy assessment regarding body systems outlined above with impairments and limitations including weakness, impaired balance, decreased endurance, decreased activity tolerance, decreased functional mobility tolerance, fall risk and SOB upon exertion  The Barthel Index was used as a functional outcome tool presenting with a score of 55 today indicating marked limitations of functional mobility and ADLS  Patient's clinical presentation is currently unstable/unpredictable as seen in patient's presentation of increased fall risk, new onset of impairment of functional mobility, decreased endurance and new onset of weakness  Pt would benefit from continued Physical Therapy treatment to address deficits as defined above and maximize level of functional mobility  As demonstrated by objective findings, the assigned level of complexity for this evaluation is high  The patient's -[facility] Basic Mobility Inpatient Short Form Raw Score is 19, Standardized Score is 42 48  A standardized score less than 42 9 suggests the patient may benefit from discharge to post-acute rehabilitation services, however pt is very close to the cut off score for going home  Anticipate that with continued PT pt's [facility] score and function will improve so that pt can go home with home PT and family support     Goals   Patient Goals "get stronger"   STG Expiration Date 03/02/21   Short Term Goal #1 independent bed mobilty, independent transfers bed to chair, independent ambulation with a walker indoor level surfaces 50 feet so pt can negotiate his home, min assist up and down 2 steps so pt can enter and exit his home   LTG Expiration Date 03/09/21   Long Term Goal #1 no falls, independent ambulation with a walker indoor level surfaces 150 feet so pt can negotiate his home, independent up and down 2 steps so pt can enter and exit his home   Plan   Treatment/Interventions ADL retraining;Functional transfer training;LE strengthening/ROM; Elevations; Therapeutic exercise; Endurance training;Patient/family training;Gait training;Bed mobility; Equipment eval/education   PT Frequency 5x/wk   Recommendation   PT Discharge Recommendation Home with skilled therapy   Equipment Recommended   (pt has a walker, cane, and 02)   AM-PAC Basic Mobility Inpatient   Turning in Bed Without Bedrails 4   Lying on Back to Sitting on Edge of Flat Bed 3   Moving Bed to Chair 3   Standing Up From Chair 3   Walk in Room 3   Climb 3-5 Stairs 3   Basic Mobility Inpatient Raw Score 19   Basic Mobility Standardized Score 42 48   Barthel Index   Feeding 10   Bathing 0   Grooming Score 0   Dressing Score 5   Bladder Score 10   Bowels Score 10   Toilet Use Score 5   Transfers (Bed/Chair) Score 10   Mobility (Level Surface) Score 0   Stairs Score 5   Barthel Index Score 54   Licensure   [place] License Number  [place] PT [id number]       Time In:1030  Time Out:1040  Total Time: 10      S:  "feeling a little better"  O:  Supervision to rise from an elevated bed and to ambulate 60 feet with a rolling walker and 3L02  Gait is steady, Sp02 stable  A:  Pt tolerated activity well  Anticipate with continued PT, strength, endurance and function will improve    P:  Continue PT    No Ng, PT

## 2021-02-23 NOTE — ASSESSMENT & PLAN NOTE
Patient states he was walking today and felt lower extremity weakness with some unbalance any held to the wall and slowly slid to the floor without hitting his head or any other part of his body  Denies loss of consciousness by headedness dizziness  · ED: Troponin neg X 1 Na 139, K 3 9, CR 1 37, AST/ALT WNL, CK WNL, INR 1 09, WBC 6 45, HB 13 1,   · PT/OT consult appreciate recommendations  · Address placement issues with case management  · Maintenance fluids IV  cc/hour, fall precautions in place

## 2021-02-23 NOTE — TELEPHONE ENCOUNTER
Just an fyi  Patient is currently in the hospital as he "stated his legs just gave out " He reports he did sleep well a couple of nights taking the trazadone 200mg nightly,  only woke up once and fell right back to sleep  He reports last night he was awake a lot of the night, however he is in the hospital right now  I let him know we would follow up with him after hospital D/C for a hospital follow up visit

## 2021-02-23 NOTE — UTILIZATION REVIEW
Initial Clinical Review  PATIENT WAS OBSERVATION STATUS 2/22/21 CONVERTED TO INPATIENT ADMISSION 2/23/21 AS NEEDING > 2MN STAY   F/U XRAY OF RIGHT KNEE AND HIP PENDING FOR GENERALIZED WEAKNESS   CONTINUE PT OT  Admission: Date/Time/Statement:   Admission Orders (From admission, onward)     Ordered        02/23/21 1819  Inpatient Admission  Once                   Orders Placed This Encounter   Procedures    Inpatient Admission     Standing Status:   Standing     Number of Occurrences:   1     Order Specific Question:   Level of Care     Answer:   Med Surg [16]     Order Specific Question:   Estimated length of stay     Answer:   More than 2 Midnights     Order Specific Question:   Certification     Answer:   I certify that inpatient services are medically necessary for this patient for a duration of greater than two midnights  See H&P and MD Progress Notes for additional information about the patient's course of treatment  ED Arrival Information     Expected Arrival Acuity Means of Arrival Escorted By Service Admission Type    - 2/22/2021 19:53 Urgent Ambulance [facility] General Medicine Urgent    Arrival Complaint    Weakness in Legs         Chief Complaint   Patient presents with    Fall     pt brought in by EMS after b/l leg weakness and "they gave out" pt fell denies hitting head, no LOC, no thinners  pt c/o b/l upper leg pain     Assessment/Plan: 67 y o  male who presents with PMH of 1 antitrypsin deficiency, severe COPD end-stage, BPH, pulmonary HTN and a lung mass presents today complaining of weakness in his bilateral lower extremities  He reports his lower extremity weakness has been present for the past 3-4 weeks  Today he says he was walking and felt imbalance and lower extremity weakness and he had to hold himself to the wall and slowly slid to the floor  Denies loss of consciousness lightheadedness or dizziness before/during/after the event    Afterwards he was unable to get up because he felt very weak in his lower extremities  He did not hit his head or any other part of his body  Weakness generalized  Assessment & Plan  Patient states he was walking today and felt lower extremity weakness with some unbalance any held to the wall and slowly slid to the floor without hitting his head or any other part of his body  Denies loss of consciousness by headedness dizziness    · ED: Troponin neg X 1 Na 139, K 3 9, CR 1 37, AST/ALT WNL, CK WNL, INR 1 09, WBC 6 45, HB 13 1,   · PT/OT consult appreciate recommendations  · Address placement issues with case management  · Maintenance fluids IV  cc/hour, fall precautions in place      COPD (chronic obstructive pulmonary disease) ([facility])  Assessment & Plan  · End-stage COPD, patient follows palliative care continue home regimen     Lung nodule  Assessment & Plan  · Stable patient follows palliative Care     BPH (benign prostatic hyperplasia)  Assessment & Plan  · Stable continue medications     Essential hypertension  Assessment & Plan  · Blood pressure stable on arrival  · Continue home medications  · Continue monitor BP per floor protocol     Gastroesophageal reflux disease  Assessment & Plan  · Stable continue home medication     AAT (alpha-1-antitrypsin) deficiency ([facility])  Assessment & Plan  Follows with Pulmonary  · Continue with weekly Zemaira injections, budesonide, and performist nebs     GLOBAL:  Replete electrolytes as needed  IV  cc/hour  SCDs and Lovenox  Cardiac diet  ED Triage Vitals [02/22/21 1957]   Temperature Pulse Respirations Blood Pressure SpO2   98 °F (36 7 °C) 82 18 140/69 95 %      Temp Source Heart Rate Source Patient Position - Orthostatic VS BP Location FiO2 (%)   Tympanic Monitor Lying Left arm --      Pain Score       3          Wt Readings from Last 1 Encounters:   02/23/21 88 5 kg (195 lb)     Additional Vital Signs:   Pertinent Labs/Diagnostic Test Results:   Results from last 7 days   Lab Units 02/22/21  7923 SARS-COV-2  Negative     Results from last 7 days   Lab Units 02/24/21  0653 02/23/21  0519 02/23/21  0101 02/22/21 2028   WBC Thousand/uL 5 56 6 86  --  6 45   HEMOGLOBIN g/dL 13 1 12 6  --  13 1   HEMATOCRIT % 39 3 39 0  --  39 3   PLATELETS Thousands/uL 133* 121* 110* 138*   NEUTROS ABS Thousands/µL  --  2 97  --  4 27   BANDS PCT % 1  --   --   --          Results from last 7 days   Lab Units 02/24/21  0653 02/23/21  0519 02/22/21 2028   SODIUM mmol/L 140 140 139   POTASSIUM mmol/L 4 0 3 4* 3 9   CHLORIDE mmol/L 105 106 104   CO2 mmol/L 27 26 28   ANION GAP mmol/L 8 8 7   BUN mg/dL 14 12 12   CREATININE mg/dL 1 12 1 28 1 37*   EGFR ml/min/1 73sq m 63 54 49   CALCIUM mg/dL 8 3 8 6 9 5   MAGNESIUM mg/dL 1 7 1 9  --    PHOSPHORUS mg/dL 3 4 2 8  --      Results from last 7 days   Lab Units 02/22/21 2028   AST U/L 23   ALT U/L 35   ALK PHOS U/L 69   TOTAL PROTEIN g/dL 6 7   ALBUMIN g/dL 3 6   TOTAL BILIRUBIN mg/dL 0 90         Results from last 7 days   Lab Units 02/24/21  0653 02/23/21  0519 02/22/21 2028   GLUCOSE RANDOM mg/dL 100 104 105             No results found for: BETA-HYDROXYBUTYRATE               Results from last 7 days   Lab Units 02/22/21 2028   CK TOTAL U/L 72     Results from last 7 days   Lab Units 02/22/21 2028   TROPONIN I ng/mL <0 02         Results from last 7 days   Lab Units 02/22/21 2028   PROTIME seconds 14 0   INR  1 09   PTT seconds 31                                                     Results from last 7 days   Lab Units 02/22/21 2217   INFLUENZA A PCR  Negative   INFLUENZA B PCR  Negative   RSV PCR  Negative                                 Results from last 7 days   Lab Units 02/24/21  0653   TOTAL COUNTED  100           ED Treatment:   Medication Administration from 02/22/2021 1953 to 02/23/2021 0004       Date/Time Order Dose Route Action Action by Comments     02/22/2021 2031 sodium chloride 0 9 % infusion 125 mL/hr Intravenous New Bag Onelia Carter RN         Past Medical History:   Diagnosis Date    Anesthesia complication     Difficult to wake up    Arthritis     BPH (benign prostatic hyperplasia)     urinary frequency    Cancer (HCC)     basal cell neck, face    COPD exacerbation (HCC) 12/29/2019    Full dentures     Hiatal hernia     History of methicillin resistant staphylococcus aureus (MRSA)     10/11/2018 MRSA (nares) positive    Irritable bowel syndrome     Kidney stone     at least 7 episodes    Liver disease     Alpha 1- enzyme deficiency - diagnosed 2002   has been on weekly replacement therapy since then    Pulmonary emphysema ([address] )     1/25/15  FEV1 - 2 45 liters or 59% of predicted    RSV infection 12/2017    Wears glasses     for driving only     Present on Admission:   BPH (benign prostatic hyperplasia)   Weakness generalized   Lung nodule   Essential hypertension   COPD (chronic obstructive pulmonary disease) (HCC)   AAT (alpha-1-antitrypsin) deficiency ([facility])   Gastroesophageal reflux disease      Admitting Diagnosis: Weakness [R53 1]  Leg weakness [R29 898]  Age/Sex: 67 y o  male  Admission Orders:  Scheduled Medications:  acetaminophen, 975 mg, Oral, Q8H [facility]  budesonide, 0 5 mg, Nebulization, BID  busPIRone, 10 mg, Oral, TID  cholestyramine sugar free, 4 g, Oral, BID  Diclofenac Sodium, 2 g, Topical, 4x Daily  enoxaparin, 40 mg, Subcutaneous, Q24H [facility]  famotidine, 20 mg, Oral, Daily  finasteride, 5 mg, Oral, QAM  fluticasone, 2 spray, Nasal, Daily  formoterol, 20 mcg, Nebulization, BID  gabapentin, 300 mg, Oral, TID  LORazepam, 1 mg, Oral, BID  midodrine, 2 5 mg, Oral, TID With Meals  OLANZapine, 7 5 mg, Oral, HS  pantoprazole, 40 mg, Oral, BID AC  tamsulosin, 0 4 mg, Oral, Daily  traZODone, 100 mg, Oral, HS      Continuous IV Infusions:     PRN Meds:  albuterol, 1 puff, Inhalation, Q6H PRN  doxylamine, 25 mg, Oral, HS PRN  ipratropium-albuterol, 3 mL, Nebulization, Q4H PRN        IP CONSULT TO CASE MANAGEMENT    Network Utilization Review Department  ATTENTION: Please call with any questions or concerns to [phone] and carefully listen to the prompts so that you are directed to the right person  All voicemails are confidential   Sophie Ohara all requests for admission clinical reviews, approved or denied determinations and any other requests to dedicated fax number below belonging to the campus where the patient is receiving treatment   List of dedicated fax numbers for the Facilities:  [address] DENIALS (Administrative/Medical Necessity) [phone]   [address] (Maternity/NICU/Pediatrics) [phone]   [address] [place] [address] Dr Danny Juan 0081 (  Miguel A Montanez "Nicki" 103) [address] [address] Jessica Michel 1481 [address] [place] [address] [phone]

## 2021-02-23 NOTE — H&P
H&P Exam - Nicole Telles 67 y o  male MRN: [id number]    Unit/Bed#: ED 01 Encounter: [id number]    48-year-old male PMH of Alpha 1 antitrypsin deficiency, severe COPD end-stage, BPH, pulmonary HTN and a lung mass presents today complaining of weakness in his bilateral lower extremities  Patient will be admitted for observation under the Family Medicine service of [address]  Expected length of stay less than 2 midnights  Patient is full code disposition depend PT/OT and case management  Assessment/Plan     * Weakness generalized  Assessment & Plan  Patient states he was walking today and felt lower extremity weakness with some unbalance any held to the wall and slowly slid to the floor without hitting his head or any other part of his body  Denies loss of consciousness by headedness dizziness  · ED: Troponin neg X 1 Na 139, K 3 9, CR 1 37, AST/ALT WNL, CK WNL, INR 1 09, WBC 6 45, HB 13 1,   · PT/OT consult appreciate recommendations  · Address placement issues with case management  · Maintenance fluids IV  cc/hour, fall precautions in place      COPD (chronic obstructive pulmonary disease) ([facility])  Assessment & Plan  · End-stage COPD, patient follows palliative care continue home regimen    Lung nodule  Assessment & Plan  · Stable patient follows palliative Care    BPH (benign prostatic hyperplasia)  Assessment & Plan  · Stable continue medications    Essential hypertension  Assessment & Plan  · Blood pressure stable on arrival  · Continue home medications  · Continue monitor BP per floor protocol    Gastroesophageal reflux disease  Assessment & Plan  · Stable continue home medication    AAT (alpha-1-antitrypsin) deficiency ([facility] [address] )  Assessment & Plan  Follows with Pulmonary  · Continue with weekly Zemaira injections, budesonide, and performist nebs    GLOBAL:  Replete electrolytes as needed  IV  cc/hour  SCDs and Lovenox  Cardiac diet    History of Present Illness     HPI:  Nicole Telles is a 67 y o  male who presents with PMH of 1 antitrypsin deficiency, severe COPD end-stage, BPH, pulmonary HTN and a lung mass presents today complaining of weakness in his bilateral lower extremities  He reports his lower extremity weakness has been present for the past 3-4 weeks  Today he says he was walking and felt imbalance and lower extremity weakness and he had to hold himself to the wall and slowly slid to the floor  Denies loss of consciousness lightheadedness or dizziness before/during/after the event  Afterwards he was unable to get up because he felt very weak in his lower extremities  He did not hit his head or any other part of his body  ER:  IV  cc/hour continuous    PCP: Balwinder Alvarez MD    Review of Systems   Constitutional: Negative for activity change, chills and fever  Eyes: Negative  Respiratory: Negative for cough and shortness of breath  Cardiovascular: Negative for chest pain and palpitations  Gastrointestinal: Negative for abdominal pain  Neurological: Positive for weakness (Lower extremities)  Negative for dizziness, light-headedness, numbness and headaches  Historical Information   Past Medical History:   Diagnosis Date    Anesthesia complication     Difficult to wake up    Arthritis     BPH (benign prostatic hyperplasia)     urinary frequency    Cancer (HCC)     basal cell neck, face    COPD exacerbation ([facility] [address] ) 12/29/2019    Full dentures     Hiatal hernia     History of methicillin resistant staphylococcus aureus (MRSA)     10/11/2018 MRSA (nares) positive    Irritable bowel syndrome     Kidney stone     at least 7 episodes    Liver disease     Alpha 1- enzyme deficiency - diagnosed 2002   has been on weekly replacement therapy since then    Pulmonary emphysema ([facility] [address] )     1/25/15  FEV1 - 2 45 liters or 59% of predicted    RSV infection 12/2017    Wears glasses     for driving only     Past Surgical History:   Procedure Laterality Date    BACK SURGERY  2008    discectomy    COLONOSCOPY      COLONOSCOPY N/A 3/10/2017    Procedure: Kasandra Nazario;  Surgeon: Philip Avelar MD;  Location: [place] [address] GI LAB; Service:    Ariel Plunk      removal of kidney stones    ESOPHAGOGASTRODUODENOSCOPY N/A 3/10/2017    Procedure: ESOPHAGOGASTRODUODENOSCOPY (EGD); Surgeon: Philip Avelar MD;  Location: [facility] GI LAB; Service:     LITHOTRIPSY      UT ESOPHAGOGASTRODUODENOSCOPY TRANSORAL DIAGNOSTIC N/A 11/20/2018    Procedure: ESOPHAGOGASTRODUODENOSCOPY (EGD); Surgeon: Philip Avelar MD;  Location: [facility] GI LAB; Service: Gastroenterology    TONSILLECTOMY      VEIN LIGATION AND STRIPPING Bilateral     1980's     Social History   Social History     Substance and Sexual Activity   Alcohol Use Not Currently    Frequency: Never    Comment: stopped in 2009     Social History     Substance and Sexual Activity   Drug Use Not Currently     Social History     Tobacco Use   Smoking Status Former Smoker    Packs/day: 1 00    Years: 60 00    Pack years: 60 00    Quit date: 8/31/2017    Years since quitting: 3 4   Smokeless Tobacco Never Used   Tobacco Comment    quit in august 2017     E-Cigarette/Vaping    E-Cigarette Use Never User       E-Cigarette/Vaping Substances    Nicotine No     THC No     CBD No     Flavoring No     Other No     Unknown No      Family History:   Family History   Problem Relation Age of Onset    Emphysema Mother         never smoked    Emphysema Father     Cancer Brother         colon    Colon cancer Brother     Ulcerative colitis Family     Liver disease Family        Meds/Allergies   PTA meds:   Prior to Admission Medications   Prescriptions Last Dose Informant Patient Reported? Taking?    Diclofenac Sodium (VOLTAREN) 1 %   No No   Sig: Apply 2 g topically 4 (four) times a day Apply to R knee   LORazepam (ATIVAN) 1 mg tablet  Self No No   Sig: TAKE ONE TABLET BY MOUTH TWICE DAILY    OLANZapine (ZyPREXA) 5 mg tablet   No No Sig: Take 1 5 tablets (7 5 mg total) by mouth daily at bedtime   OXYGEN-HELIUM IN  Self Yes No   Sig: Inhale 2L at rest- 3L with activity   Ventolin  (90 Base) MCG/ACT inhaler  Self No No   Sig: Inhale 1 puff every 6 (six) hours as needed for shortness of breath   ZEMAIRA infusion  Self Yes No   Sig: once a week    acetaminophen (TYLENOL) 500 mg tablet   No No   Sig: Take 2 tablets (1,000 mg total) by mouth every 8 (eight) hours   amLODIPine (NORVASC) 2 5 mg tablet  Self No No   Sig: Take 1 tablet (2 5 mg total) by mouth daily   budesonide (PULMICORT) 0 5 mg/2 mL nebulizer solution  Self No No   Sig: Take 1 vial (0 5 mg total) by nebulization 2 (two) times a day Rinse mouth after use     busPIRone (BUSPAR) 10 mg tablet  Self No No   Sig: Take 1 tablet (10 mg total) by mouth 3 (three) times a day   cholestyramine sugar free (QUESTRAN LIGHT) 4 g packet   No No   Sig: Take 1 packet (4 g total) by mouth 2 (two) times a day   doxylamine (Unisom SleepTabs) 25 MG tablet  Self Yes No   Sig: Take 25 mg by mouth daily at bedtime as needed for sleep   famotidine (PEPCID) 20 mg tablet  Self No No   Sig: Take 1 tablet (20 mg total) by mouth daily   finasteride (PROSCAR) 5 mg tablet  Self No No   Sig: Take 1 tablet (5 mg total) by mouth every morning   fluticasone (FLONASE) 50 mcg/act nasal spray  Self No No   Si sprays into each nostril daily   formoterol (Perforomist) 20 MCG/2ML nebulizer solution  Self No No   Sig: Take 1 vial (20 mcg total) by nebulization 2 (two) times a day   gabapentin (NEURONTIN) 300 mg capsule  Self No No   Sig: Take 1 capsule (300 mg total) by mouth 3 (three) times a day   guaiFENesin (ROBITUSSIN) 100 MG/5ML oral liquid  Self Yes No   Sig: Take 200 mg by mouth 2 (two) times a day   ipratropium-albuterol (DUO-NEB) 0 5-2 5 mg/3 mL nebulizer solution   No No   Sig: take 1 vial (3ml) by nebulization four times a day as needed    midodrine (PROAMATINE) 2 5 mg tablet  Self No No   Sig: Take 1 tablet (2 5 mg total) by mouth 3 (three) times a day   pantoprazole (PROTONIX) 40 mg tablet  Self No No   Sig: Take 1 tablet (40 mg total) by mouth 2 (two) times a day   tamsulosin (FLOMAX) 0 4 mg  Self No No   Sig: Half a tablet daily   tamsulosin (FLOMAX) 0 4 mg  Self Yes No   Sig: Take 0 4 mg by mouth daily   traZODone (DESYREL) 100 mg tablet   No No   Sig: Take 1 tablet (100 mg total) by mouth daily at bedtime      Facility-Administered Medications: None     Allergies   Allergen Reactions    Chantix [Varenicline]      Caused prostate infection       Objective   Vitals: Blood pressure 141/69, pulse 66, temperature 98 °F (36 7 °C), temperature source Tympanic, resp  rate 18, weight 90 kg (198 lb 6 6 oz), SpO2 98 %  No intake or output data in the 24 hours ending 21 2238    Invasive Devices     Peripheral Intravenous Line            Peripheral IV 21 Right Antecubital less than 1 day                Physical Exam  Vitals signs and nursing note reviewed  Constitutional:       General: He is awake  He is not in acute distress  Appearance: He is well-developed  HENT:      Head: Normocephalic and atraumatic  Eyes:      Conjunctiva/sclera: Conjunctivae normal    Neck:      Musculoskeletal: Neck supple  Cardiovascular:      Rate and Rhythm: Normal rate and regular rhythm  Heart sounds: Normal heart sounds, S1 normal and S2 normal  No murmur  Pulmonary:      Effort: Pulmonary effort is normal  No respiratory distress  Breath sounds: Normal breath sounds  No decreased breath sounds, wheezing, rhonchi or rales  Abdominal:      General: Abdomen is flat  Bowel sounds are normal       Palpations: Abdomen is soft  Tenderness: There is no abdominal tenderness  There is no guarding or rebound  Musculoskeletal:      Right lower le+ Pitting Edema present  Left lower le+ Pitting Edema present  Comments: Patient was unable of lifting bilateral lower extremities    With some assistance he is able to lift his lower extremities  He can bend his knees and move his toes  Sensation intact bilateral lower extremities  Lymphadenopathy:      Cervical: No cervical adenopathy  Right cervical: No superficial or posterior cervical adenopathy  Left cervical: No superficial or posterior cervical adenopathy  Skin:     General: Skin is warm and dry  Neurological:      Mental Status: He is alert  Psychiatric:         Behavior: Behavior is cooperative  Lab Results: I have personally reviewed pertinent reports       Results for orders placed or performed during the hospital encounter of 02/22/21   CBC and differential   Result Value Ref Range    WBC 6 45 4 31 - 10 16 Thousand/uL    RBC 4 11 3 88 - 5 62 Million/uL    Hemoglobin 13 1 12 0 - 17 0 g/dL    Hematocrit 39 3 36 5 - 49 3 %    MCV 96 82 - 98 fL    MCH 31 9 26 8 - 34 3 pg    MCHC 33 3 31 4 - 37 4 g/dL    RDW 13 1 11 6 - 15 1 %    MPV 8 9 8 9 - 12 7 fL    Platelets 014 (L) 538 - 390 Thousands/uL    nRBC 0 /100 WBCs    Neutrophils Relative 65 43 - 75 %    Immat GRANS % 1 0 - 2 %    Lymphocytes Relative 22 14 - 44 %    Monocytes Relative 9 4 - 12 %    Eosinophils Relative 2 0 - 6 %    Basophils Relative 1 0 - 1 %    Neutrophils Absolute 4 27 1 85 - 7 62 Thousands/µL    Immature Grans Absolute 0 03 0 00 - 0 20 Thousand/uL    Lymphocytes Absolute 1 40 0 60 - 4 47 Thousands/µL    Monocytes Absolute 0 58 0 17 - 1 22 Thousand/µL    Eosinophils Absolute 0 12 0 00 - 0 61 Thousand/µL    Basophils Absolute 0 05 0 00 - 0 10 Thousands/µL   Comprehensive metabolic panel   Result Value Ref Range    Sodium 139 136 - 145 mmol/L    Potassium 3 9 3 5 - 5 3 mmol/L    Chloride 104 100 - 108 mmol/L    CO2 28 21 - 32 mmol/L    ANION GAP 7 4 - 13 mmol/L    BUN 12 5 - 25 mg/dL    Creatinine 1 37 (H) 0 60 - 1 30 mg/dL    Glucose 105 65 - 140 mg/dL    Calcium 9 5 8 3 - 10 1 mg/dL    AST 23 5 - 45 U/L    ALT 35 12 - 78 U/L    Alkaline Phosphatase 69 46 - 116 U/L    Total Protein 6 7 6 4 - 8 2 g/dL    Albumin 3 6 3 5 - 5 0 g/dL    Total Bilirubin 0 90 0 20 - 1 00 mg/dL    eGFR 49 ml/min/1 73sq m   Protime-INR   Result Value Ref Range    Protime 14 0 11 6 - 14 5 seconds    INR 1 09 0 84 - 1 19   APTT   Result Value Ref Range    PTT 31 23 - 37 seconds   Troponin I   Result Value Ref Range    Troponin I <0 02 <=0 04 ng/mL   CK (with reflex to MB)   Result Value Ref Range    Total CK 72 39 - 308 U/L       Imaging: I have personally reviewed pertinent reports  No orders to display       EKG, Pathology, and Other Studies: I have personally reviewed pertinent reports  NSR    Code Status: LEVEL 1 full code  Counseling / Coordination of Care  Total floor / unit time spent today 45 minutes  Greater than 50% of total time was spent with the patient and / or family counseling and / or coordination of care  A description of the counseling / coordination of care:  Plan discussed with the resident attending physician on-call, they agree with current plan

## 2021-02-23 NOTE — ASSESSMENT & PLAN NOTE
· Blood pressure stable on arrival  · Continue home medications  · Continue monitor BP per floor protocol

## 2021-02-23 NOTE — CASE MANAGEMENT
LOS 0 days  Patient is not a bundle or a 30 day readmission  SW met with patient to discuss discharge planning and complete CM open  Patient is alert and oriented x4  SW introduced self and explained role  Patient lives alone in a one level home with 2 steps to enter  Patient uses a cane, walker, and 02 (2L NC)  Independent with ADL's and is able to drive  Hx of home care through [facility]  Hx of inpatient rehab at [facility] and [facility]  Denies hx of drug/alcohol issues  Patient has Anxiety and Depression which is being treated by his PCP Dr Vanesa Middleton  Preferred pharmacy is [facility] in [address]  Denies difficulty affording medications  Daughter Shakir Salmon is patient POA  SW discussed recommendation for home pt/ot  Patient prefers outpatient PT/OT  Patient states he was supposed to go to an appointment today however he missed it since he is in the hospital  Patient wishes to continue with this once discharged instead of home health care services

## 2021-02-23 NOTE — PLAN OF CARE
Problem: Potential for Falls  Goal: Patient will remain free of falls  Description: INTERVENTIONS:  - Assess patient frequently for physical needs  -  Identify cognitive and physical deficits and behaviors that affect risk of falls  -  [place] fall precautions as indicated by assessment   - Educate patient/family on patient safety including physical limitations  - Instruct patient to call for assistance with activity based on assessment  - Modify environment to reduce risk of injury  - Consider OT/PT consult to assist with strengthening/mobility  Outcome: Progressing     Problem: Nutrition/Hydration-ADULT  Goal: Nutrient/Hydration intake appropriate for improving, restoring or maintaining nutritional needs  Description: Monitor and assess patient's nutrition/hydration status for malnutrition  Collaborate with interdisciplinary team and initiate plan and interventions as ordered  Monitor patient's weight and dietary intake as ordered or per policy  Utilize nutrition screening tool and intervene as necessary  Determine patient's food preferences and provide high-protein, high-caloric foods as appropriate       INTERVENTIONS:  - Monitor oral intake, urinary output, labs, and treatment plans  - Assess nutrition and hydration status and recommend course of action  - Evaluate amount of meals eaten  - Assist patient with eating if necessary   - Allow adequate time for meals  - Recommend/ encourage appropriate diets, oral nutritional supplements, and vitamin/mineral supplements  - Order, calculate, and assess calorie counts as needed  - Recommend, monitor, and adjust tube feedings and TPN/PPN based on assessed needs  - Assess need for intravenous fluids  - Provide specific nutrition/hydration education as appropriate  - Include patient/family/caregiver in decisions related to nutrition  Outcome: Progressing     Problem: PAIN - ADULT  Goal: Verbalizes/displays adequate comfort level or baseline comfort level  Description: Interventions:  - Encourage patient to monitor pain and request assistance  - Assess pain using appropriate pain scale  - Administer analgesics based on type and severity of pain and evaluate response  - Implement non-pharmacological measures as appropriate and evaluate response  - Consider cultural and social influences on pain and pain management  - Notify physician/advanced practitioner if interventions unsuccessful or patient reports new pain  Outcome: Progressing     Problem: INFECTION - ADULT  Goal: Absence or prevention of progression during hospitalization  Description: INTERVENTIONS:  - Assess and monitor for signs and symptoms of infection  - Monitor lab/diagnostic results  - Monitor all insertion sites, i e  indwelling lines, tubes, and drains  - Monitor endotracheal if appropriate and nasal secretions for changes in amount and color  - [place] appropriate cooling/warming therapies per order  - Administer medications as ordered  - Instruct and encourage patient and family to use good hand hygiene technique  - Identify and instruct in appropriate isolation precautions for identified infection/condition  Outcome: Progressing     Problem: SAFETY ADULT  Goal: Patient will remain free of falls  Description: INTERVENTIONS:  - Assess patient frequently for physical needs  -  Identify cognitive and physical deficits and behaviors that affect risk of falls    -  [place] fall precautions as indicated by assessment   - Educate patient/family on patient safety including physical limitations  - Instruct patient to call for assistance with activity based on assessment  - Modify environment to reduce risk of injury  - Consider OT/PT consult to assist with strengthening/mobility  Outcome: Progressing  Goal: Maintain or return to baseline ADL function  Description: INTERVENTIONS:  -  Assess patient's ability to carry out ADLs; assess patient's baseline for ADL function and identify physical deficits which impact ability to perform ADLs (bathing, care of mouth/teeth, toileting, grooming, dressing, etc )  - Assess/evaluate cause of self-care deficits   - Assess range of motion  - Assess patient's mobility; develop plan if impaired  - Assess patient's need for assistive devices and provide as appropriate  - Encourage maximum independence but intervene and supervise when necessary  - Involve family in performance of ADLs  - Assess for home care needs following discharge   - Consider OT consult to assist with ADL evaluation and planning for discharge  - Provide patient education as appropriate  Outcome: Progressing  Goal: Maintain or return mobility status to optimal level  Description: INTERVENTIONS:  - Assess patient's baseline mobility status (ambulation, transfers, stairs, etc )    - Identify cognitive and physical deficits and behaviors that affect mobility  - Identify mobility aids required to assist with transfers and/or ambulation (gait belt, sit-to-stand, lift, walker, cane, etc )  - [place] fall precautions as indicated by assessment  - Record patient progress and toleration of activity level on Mobility SBAR; progress patient to next Phase/Stage  - Instruct patient to call for assistance with activity based on assessment  - Consider rehabilitation consult to assist with strengthening/weightbearing, etc   Outcome: Progressing     Problem: DISCHARGE PLANNING  Goal: Discharge to home or other facility with appropriate resources  Description: INTERVENTIONS:  - Identify barriers to discharge w/patient and caregiver  - Arrange for needed discharge resources and transportation as appropriate  - Identify discharge learning needs (meds, wound care, etc )  - Arrange for interpretive services to assist at discharge as needed  - Refer to Case Management Department for coordinating discharge planning if the patient needs post-hospital services based on physician/advanced practitioner order or complex needs related to functional status, cognitive ability, or social support system  Outcome: Progressing     Problem: Knowledge Deficit  Goal: Patient/family/caregiver demonstrates understanding of disease process, treatment plan, medications, and discharge instructions  Description: Complete learning assessment and assess knowledge base    Interventions:  - Provide teaching at level of understanding  - Provide teaching via preferred learning methods  Outcome: Progressing     Problem: Prexisting or High Potential for Compromised Skin Integrity  Goal: Skin integrity is maintained or improved  Description: INTERVENTIONS:  - Identify patients at risk for skin breakdown  - Assess and monitor skin integrity  - Assess and monitor nutrition and hydration status  - Monitor labs   - Assess for incontinence   - Turn and reposition patient  - Assist with mobility/ambulation  - Relieve pressure over bony prominences  - Avoid friction and shearing  - Provide appropriate hygiene as needed including keeping skin clean and dry  - Evaluate need for skin moisturizer/barrier cream  - Collaborate with interdisciplinary team   - Patient/family teaching  - Consider wound care consult   Outcome: Progressing     Problem: RESPIRATORY - ADULT  Goal: Achieves optimal ventilation and oxygenation  Description: INTERVENTIONS:  - Assess for changes in respiratory status  - Assess for changes in mentation and behavior  - Position to facilitate oxygenation and minimize respiratory effort  - Oxygen administered by appropriate delivery if ordered  - Initiate smoking cessation education as indicated  - Encourage broncho-pulmonary hygiene including cough, deep breathe, Incentive Spirometry  - Assess the need for suctioning and aspirate as needed  - Assess and instruct to report SOB or any respiratory difficulty  - Respiratory Therapy support as indicated  Outcome: Progressing

## 2021-02-23 NOTE — ASSESSMENT & PLAN NOTE
Stable patient follows Dr Carlos Alberto Proctor    · Chest CT 01/13/2021: left lower lobe pulmonary nodule is almost completely resolved with minimal residual groundglass opacity, also suggesting improvement of previous inflammatory or infectious process

## 2021-02-23 NOTE — DISCHARGE SUMMARY
[facility] Discharge Summary - Medical Brodie Smallwood 67 y o  male MRN: [id number]    [address] Room / Bed: [address] 419[address] [place] Encounter: [id number]    BRIEF OVERVIEW  Admitting Provider: Stephanie Lassiter MD  Discharge Provider: As above    Discharge To: Home self-care  Facility:  None    Outpatient Follow-Up:  Flaco Fragoso  Things to address at first follow up visit:    1) Is patient following a PT outpatient    2) BP stable? Taken off amlodipine this admission    Labs and results pending at discharge:  Final read of x-rays    Admission Date: 2/22/2021     Discharge Date: 2/24/21  Primary Discharge Diagnosis  Principal Problem:    Weakness generalized  Active Problems:    AAT (alpha-1-antitrypsin) deficiency (HCC)    Lung nodule    Gastroesophageal reflux disease    Essential hypertension    BPH (benign prostatic hyperplasia)    COPD (chronic obstructive pulmonary disease) (HCC)    Pain of right lower extremity  Resolved Problems:    * No resolved hospital problems  *        Consulting Providers   PT/OT  [address]        HPI  Per admitting team:  Brodie Smallwood is a 67 y o  male who presents with PMH of 1 antitrypsin deficiency, severe COPD end-stage, BPH, pulmonary HTN and a lung mass presents today complaining of weakness in his bilateral lower extremities  He reports his lower extremity weakness has been present for the past 3-4 weeks  Today he says he was walking and felt imbalance and lower extremity weakness and he had to hold himself to the wall and slowly slid to the floor  Denies loss of consciousness lightheadedness or dizziness before/during/after the event  Afterwards he was unable to get up because he felt very weak in his lower extremities  He did not hit his head or any other part of his body      Hospital Course  Patient was admitted to the hospital for generalized weakness and ambulatory dysfunction PT/OT was consulted who recommended physical therapy  Case Management also consulted given patient lives alone  Home PT OT was recommended however patient prefers outpatient PT OT and states he will be adherent  Uses a cane as walker and is independent with ADLs and able to drive  Patient to follow-up with PCP to ensure strengthening PT/OT    Physical Exam at Discharge  Please see progress note from day of discharge    Procedures Performed/Pertinent Test results  XR hip/pelv 2-3 vws right if performed    (Results Pending)   XR knee 4+ vw right injury    (Results Pending)     2/23: Na 140, K 3 4, CR 1 28, WBC 6 86, HB 12 6, Plt 121  2/22:  Troponin neg X 1 Na 139, K 3 9, CR 1 37, AST/ALT WNL, CK WNL, INR 1 09, WBC 6 45, HB 13 1,           Medications   Current Discharge Medication List          Current Discharge Medication List      CONTINUE these medications which have NOT CHANGED    Details   acetaminophen (TYLENOL) 500 mg tablet Take 2 tablets (1,000 mg total) by mouth every 8 (eight) hours  Qty: 90 tablet, Refills: 3    Associated Diagnoses: Palliative care patient; Arthritis      budesonide (PULMICORT) 0 5 mg/2 mL nebulizer solution Take 1 vial (0 5 mg total) by nebulization 2 (two) times a day Rinse mouth after use  Qty: 60 vial, Refills: 11    Associated Diagnoses: Centrilobular emphysema (HCC)      busPIRone (BUSPAR) 10 mg tablet Take 1 tablet (10 mg total) by mouth 3 (three) times a day  Qty: 90 tablet, Refills: 2    Associated Diagnoses: Chronic respiratory failure with hypoxia (HCC)      cholestyramine sugar free (QUESTRAN LIGHT) 4 g packet Take 1 packet (4 g total) by mouth 2 (two) times a day  Qty: 60 packet, Refills: 1    Associated Diagnoses: AAT (alpha-1-antitrypsin) deficiency ([facility] [address] ); Diarrhea, unspecified type      Diclofenac Sodium (VOLTAREN) 1 % Apply 2 g topically 4 (four) times a day Apply to R knee  Qty: 50 g, Refills: 0    Associated Diagnoses: Palliative care patient;  Arthritis doxylamine ([employer] SleepTabs) 25 MG tablet Take 25 mg by mouth daily at bedtime as needed for sleep      famotidine (PEPCID) 20 mg tablet Take 1 tablet (20 mg total) by mouth daily  Qty: 30 tablet, Refills: 3    Associated Diagnoses: Gastroesophageal reflux disease with esophagitis      finasteride (PROSCAR) 5 mg tablet Take 1 tablet (5 mg total) by mouth every morning  Qty: 90 tablet, Refills: 3    Associated Diagnoses: Benign prostatic hyperplasia with urinary obstruction      fluticasone (FLONASE) 50 mcg/act nasal spray 2 sprays into each nostril daily  Qty: 16 g, Refills: 5    Associated Diagnoses: Rhinitis, unspecified type      formoterol (Perforomist) 20 MCG/2ML nebulizer solution Take 1 vial (20 mcg total) by nebulization 2 (two) times a day  Qty: 60 vial, Refills: 11    Associated Diagnoses: Centrilobular emphysema (HCC)      gabapentin (NEURONTIN) 300 mg capsule Take 1 capsule (300 mg total) by mouth 3 (three) times a day  Qty: 90 capsule, Refills: 6    Associated Diagnoses: Neuropathy      LORazepam (ATIVAN) 1 mg tablet TAKE ONE TABLET BY MOUTH TWICE DAILY   Qty: 60 tablet, Refills: 3    Associated Diagnoses: Anxiety      midodrine (PROAMATINE) 2 5 mg tablet Take 1 tablet (2 5 mg total) by mouth 3 (three) times a day  Qty: 90 tablet, Refills: 11    Associated Diagnoses: Orthostatic hypertension      OLANZapine (ZyPREXA) 5 mg tablet Take 1 5 tablets (7 5 mg total) by mouth daily at bedtime  Qty: 45 tablet, Refills: 2    Associated Diagnoses: Insomnia, unspecified type; Palliative care patient; Dysphoric mood      OXYGEN-HELIUM IN Inhale 2L at rest- 3L with activity      pantoprazole (PROTONIX) 40 mg tablet Take 1 tablet (40 mg total) by mouth 2 (two) times a day  Qty: 180 tablet, Refills: 3    Associated Diagnoses: Gastroesophageal reflux disease without esophagitis      !! tamsulosin (FLOMAX) 0 4 mg Half a tablet daily  Qty: 30 capsule, Refills: 5    Associated Diagnoses: BPH (benign prostatic hyperplasia)      ! ! tamsulosin (FLOMAX) 0 4 mg Take 0 4 mg by mouth daily      traZODone (DESYREL) 100 mg tablet Take 1 tablet (100 mg total) by mouth daily at bedtime  Qty: 30 tablet, Refills: 2    Associated Diagnoses: Palliative care patient; Insomnia, unspecified type      Ventolin  (90 Base) MCG/ACT inhaler Inhale 1 puff every 6 (six) hours as needed for shortness of breath  Qty: 1 Inhaler, Refills: 11    Comments: Substitution to a formulary equivalent within the same pharmaceutical class is authorized  Associated Diagnoses: Centrilobular emphysema ([facility] )      ZEMAIRA infusion once a week       guaiFENesin (ROBITUSSIN) 100 MG/5ML oral liquid Take 200 mg by mouth 2 (two) times a day      ipratropium-albuterol (DUO-NEB) 0 5-2 5 mg/3 mL nebulizer solution take 1 vial (3ml) by nebulization four times a day as needed   Qty: 360 mL, Refills: 0    Associated Diagnoses: COPD exacerbation ([facility] )       ! ! - Potential duplicate medications found  Please discuss with provider  Current Discharge Medication List         Current Discharge Medication List      STOP taking these medications       amLODIPine (NORVASC) 2 5 mg tablet Comments:   Reason for Stopping:                  Allergies  Allergies   Allergen Reactions    Chantix [Varenicline]      Caused prostate infection       Diet restrictions: none  Activity restrictions: As tolerated  Code Status: Level 1 - Full Code  Advance Directive and Living Will: <no information>  Power of :    POLST:      Discharge Condition: stable      Discharge  Statement   I spent 35 minutes discharging the patient  This time was spent on the day of discharge  I had direct contact with the patient on the day of discharge  Additional documentation is required if more than 30 minutes were spent on discharge

## 2021-02-23 NOTE — PHYSICIAN ADVISOR
Current patient class: Observation  The patient is currently on Hospital Day: 2 at [address] [place] [address]        The patient was admitted to the hospital  on N/A at N/A for the following diagnosis:  Weakness [R53 1]  Leg weakness [R29 898]     After review of the relevant documentation, labs, vital signs and test results, the patient is most appropriate for OBSERVATION STATUS  Rationale is as follows: The patient is a 67 yrs   Male who presented to the ED at 2/22/2021  7:54 PM with a chief complaint of Fall (pt brought in by EMS after b/l leg weakness and "they gave out" pt fell denies hitting head, no LOC, no thinners  pt c/o b/l upper leg pain)  Patient came in for this ambulatory dysfunction  Patient was put on IV fluids which were discontinued earlier this morning  I do not see any other acute active  At this time  Physical therapy was recommending home with PT for which the patient prefers to do outpatient therapy  Given the above I am recommending keeping the patient observation status    The patients vitals on arrival were   ED Triage Vitals [02/22/21 1957]   Temperature Pulse Respirations Blood Pressure SpO2   98 °F (36 7 °C) 82 18 140/69 95 %      Temp Source Heart Rate Source Patient Position - Orthostatic VS BP Location FiO2 (%)   Tympanic Monitor Lying Left arm --      Pain Score       3           Past Medical History:   Diagnosis Date    Anesthesia complication     Difficult to wake up    Arthritis     BPH (benign prostatic hyperplasia)     urinary frequency    Cancer (HCC)     basal cell neck, face    COPD exacerbation (HCC) 12/29/2019    Full dentures     Hiatal hernia     History of methicillin resistant staphylococcus aureus (MRSA)     10/11/2018 MRSA (nares) positive    Irritable bowel syndrome     Kidney stone     at least 7 episodes    Liver disease     Alpha 1- enzyme deficiency - diagnosed 2002   has been on weekly replacement therapy since then   [facility] Pulmonary emphysema ([facility] [address] )     1/25/15  FEV1 - 2 45 liters or 59% of predicted    RSV infection 12/2017    Wears glasses     for driving only     Past Surgical History:   Procedure Laterality Date    BACK SURGERY  2008    discectomy    COLONOSCOPY      COLONOSCOPY N/A 3/10/2017    Procedure: Mikaetta Dry;  Surgeon: Wilfred Calvert MD;  Location: [place] [address] GI LAB; Service:    Devere Kettle      removal of kidney stones    ESOPHAGOGASTRODUODENOSCOPY N/A 3/10/2017    Procedure: ESOPHAGOGASTRODUODENOSCOPY (EGD); Surgeon: Wilfred Calvert MD;  [place] [facility]; Service:     LITHOTRIPSY      NC ESOPHAGOGASTRODUODENOSCOPY TRANSORAL DIAGNOSTIC N/A 11/20/2018    Procedure: ESOPHAGOGASTRODUODENOSCOPY (EGD); Surgeon: Wilfred Calvert MD;  [place] [facility]; Service: Gastroenterology    TONSILLECTOMY      VEIN LIGATION AND STRIPPING Bilateral     1980's           Consults have been placed to:   IP CONSULT TO CASE MANAGEMENT    Vitals:    02/23/21 0326 02/23/21 0746 02/23/21 0929 02/23/21 1505   BP: 124/62  168/86 164/86   BP Location:   Left arm    Pulse: 74  69 65   Resp: 19 16 16 19   Temp: (!) 97 4 °F (36 3 °C)  97 8 °F (36 6 °C) 97 6 °F (36 4 °C)   TempSrc:   Oral    SpO2: 94% 98% 97% 98%   Weight:       Height:           Most recent labs:    Recent Labs     02/22/21 2028 02/23/21  0519   WBC 6 45  --  6 86   HGB 13 1  --  12 6   HCT 39 3  --  39 0   *   < > 121*   K 3 9  --  3 4*   CALCIUM 9 5  --  8 6   BUN 12  --  12   CREATININE 1 37*  --  1 28   INR 1 09  --   --    TROPONINI <0 02  --   --    CKTOTAL 72  --   --    AST 23  --   --    ALT 35  --   --    ALKPHOS 69  --   --     < > = values in this interval not displayed         Scheduled Meds:  Current Facility-Administered Medications   Medication Dose Route Frequency Provider Last Rate    acetaminophen  975 mg Oral [facility] Ronda Louis MD      albuterol  1 puff Inhalation Q6H PRN Ronda Louis MD  budesonide  0 5 mg Nebulization BID Duke Lugo MD      busPIRone  10 mg Oral TID Duke Lugo MD      cholestyramine sugar free  4 g Oral BID Duke Lugo MD      Diclofenac Sodium  2 g Topical 4x Daily Duke Lugo MD      doxylamine  25 mg Oral HS PRN Duke Lugo MD      enoxaparin  40 mg Subcutaneous Q24H Enzo Moyer MD      famotidine  20 mg Oral Daily Duke Lugo MD      finasteride  5 mg Oral QAM Duke Lugo MD      fluticasone  2 spray Nasal Daily Duke Lugo MD      formoterol  20 mcg Nebulization BID Duke Lugo MD      gabapentin  300 mg Oral TID Duke Lugo MD      ipratropium-albuterol  3 mL Nebulization Q4H PRN Duke Lugo MD      LORazepam  1 mg Oral BID Duke Lugo MD      midodrine  2 5 mg Oral TID With [id number] [place] [address], MD      OLANZapine  7 5 mg Oral HS Duke Lugo MD      pantoprazole  40 mg Oral BID AC Duke Lugo MD      tamsulosin  0 4 mg Oral Daily Duke Lugo MD      traZODone  100 mg Oral HS Duke Lugo MD       Continuous Infusions:   PRN Meds: albuterol    doxylamine    ipratropium-albuterol

## 2021-02-23 NOTE — PROGRESS NOTES
[facility] Practice Progress Note - Niesha Payan 67 y o  male MRN: [id number]    Unit/Bed#: [address] [place], [place] [id number] Encounter: [id number]      Assessment/Plan:  * Weakness generalized  Assessment & Plan  Patient states he was walking today and felt lower extremity weakness with some unbalance any held to the wall and slowly slid to the floor without hitting his head or any other part of his body  Denies loss of consciousness by headedness dizziness  · ED: Troponin neg X 1 Na 139, K 3 9, CR 1 37, AST/ALT WNL, CK WNL, INR 1 09, WBC 6 45, HB 13 1,   · PT/OT consult appreciate recommendations  · Address placement issues with case management  · Maintenance fluids IV  cc/hour, fall precautions in place  AAT (alpha-1-antitrypsin) deficiency ([facility] [address] )  Assessment & Plan  Follows with Pulmonary  · Continue with weekly Zemaira injections, budesonide, and performist nebs    Lung nodule  Assessment & Plan  Stable patient follows Dr Ry Blanchard  · Chest CT 01/13/2021: left lower lobe pulmonary nodule is almost completely resolved with minimal residual groundglass opacity, also suggesting improvement of previous inflammatory or infectious process    COPD (chronic obstructive pulmonary disease) (HCC)  Assessment & Plan  · End-stage COPD, patient follows palliative care continue home regimen    BPH (benign prostatic hyperplasia)  Assessment & Plan  · Stable continue medications    Essential hypertension  Assessment & Plan  · Blood pressure stable on arrival  · Continue home medications  · Continue monitor BP per floor protocol    Gastroesophageal reflux disease  Assessment & Plan  · Stable continue home medication          Subjective:   Pt resting comfortably with no discomfort or complaints  States he is doing well  Objective:           Vitals: Blood pressure 168/80, pulse 70, temperature 98 °F (36 7 °C), temperature source Tympanic, resp  rate 21, weight 90 kg (198 lb 6 6 oz), SpO2 98 %  ,Body mass index is 23 23 kg/m²  Wt Readings from Last 3 Encounters:   02/22/21 90 kg (198 lb 6 6 oz)   02/09/21 87 7 kg (193 lb 6 4 oz)   01/20/21 86 6 kg (191 lb)     No intake or output data in the 24 hours ending 02/23/21 0014    Physical Exam:    General: Pt isnot in any acute distress  Cardio: RRR, S1 and S2 +, no murmurs  Resp: CTA b/l, no wheezes/rales/rhonchi  Abdomen: soft, NT/ND, BS+  Extremities: no cyanosis  HAS 2+ PITTING EDEMA b/l le     NEURO: UNABLE TO MOVE LOWER EXTREMITIES OFF THE BED      Recent Results (from the past 24 hour(s))   CBC and differential    Collection Time: 02/22/21  8:28 PM   Result Value Ref Range    WBC 6 45 4 31 - 10 16 Thousand/uL    RBC 4 11 3 88 - 5 62 Million/uL    Hemoglobin 13 1 12 0 - 17 0 g/dL    Hematocrit 39 3 36 5 - 49 3 %    MCV 96 82 - 98 fL    MCH 31 9 26 8 - 34 3 pg    MCHC 33 3 31 4 - 37 4 g/dL    RDW 13 1 11 6 - 15 1 %    MPV 8 9 8 9 - 12 7 fL    Platelets 077 (L) 861 - 390 Thousands/uL    nRBC 0 /100 WBCs    Neutrophils Relative 65 43 - 75 %    Immat GRANS % 1 0 - 2 %    Lymphocytes Relative 22 14 - 44 %    Monocytes Relative 9 4 - 12 %    Eosinophils Relative 2 0 - 6 %    Basophils Relative 1 0 - 1 %    Neutrophils Absolute 4 27 1 85 - 7 62 Thousands/µL    Immature Grans Absolute 0 03 0 00 - 0 20 Thousand/uL    Lymphocytes Absolute 1 40 0 60 - 4 47 Thousands/µL    Monocytes Absolute 0 58 0 17 - 1 22 Thousand/µL    Eosinophils Absolute 0 12 0 00 - 0 61 Thousand/µL    Basophils Absolute 0 05 0 00 - 0 10 Thousands/µL   Comprehensive metabolic panel    Collection Time: 02/22/21  8:28 PM   Result Value Ref Range    Sodium 139 136 - 145 mmol/L    Potassium 3 9 3 5 - 5 3 mmol/L    Chloride 104 100 - 108 mmol/L    CO2 28 21 - 32 mmol/L    ANION GAP 7 4 - 13 mmol/L    BUN 12 5 - 25 mg/dL    Creatinine 1 37 (H) 0 60 - 1 30 mg/dL    Glucose 105 65 - 140 mg/dL    Calcium 9 5 8 3 - 10 1 mg/dL    AST 23 5 - 45 U/L    ALT 35 12 - 78 U/L    Alkaline Phosphatase 69 46 - 116 U/L    Total Protein 6 7 6 4 - 8 2 g/dL    Albumin 3 6 3 5 - 5 0 g/dL    Total Bilirubin 0 90 0 20 - 1 00 mg/dL    eGFR 49 ml/min/1 73sq m   Protime-INR    Collection Time: 02/22/21  8:28 PM   Result Value Ref Range    Protime 14 0 11 6 - 14 5 seconds    INR 1 09 0 84 - 1 19   APTT    Collection Time: 02/22/21  8:28 PM   Result Value Ref Range    PTT 31 23 - 37 seconds   Troponin I    Collection Time: 02/22/21  8:28 PM   Result Value Ref Range    Troponin I <0 02 <=0 04 ng/mL   CK (with reflex to MB)    Collection Time: 02/22/21  8:28 PM   Result Value Ref Range    Total CK 72 39 - 308 U/L   COVID19, Influenza A/B, RSV PCR, [facility]    Collection Time: 02/22/21 10:17 PM    Specimen: Nasopharyngeal Swab; Nares   Result Value Ref Range    SARS-CoV-2 Negative Negative    INFLUENZA A PCR Negative Negative    INFLUENZA B PCR Negative Negative    RSV PCR Negative Negative       Current Facility-Administered Medications   Medication Dose Route Frequency Provider Last Rate Last Admin    acetaminophen (TYLENOL) tablet 1,000 mg  1,000 mg Oral Q8H Enzo Moyer MD        albuterol (PROVENTIL HFA,VENTOLIN HFA) inhaler 1 puff  1 puff Inhalation Q6H PRN Linda Freeman MD        amLODIPine (NORVASC) tablet 2 5 mg  2 5 mg Oral Daily Linda Freeman MD        budesonide (PULMICORT) inhalation solution 0 5 mg  0 5 mg Nebulization BID Linda Freeman MD        busPIRone (BUSPAR) tablet 10 mg  10 mg Oral TID Linda Freeman MD        cholestyramine sugar free (QUESTRAN LIGHT) packet 4 g  4 g Oral BID Linda Freeman MD        Diclofenac Sodium (VOLTAREN) 1 % topical gel 2 g  2 g Topical 4x Daily Linda Freeman MD        doxylamine (UNISON) tablet 25 mg  25 mg Oral HS PRN Linda Freeman MD        enoxaparin (LOVENOX) subcutaneous injection 40 mg  40 mg Subcutaneous BID Linda Freeman MD        famotidine (PEPCID) tablet 20 mg  20 mg Oral Daily Fe Davis MD        finasteride (PROSCAR) tablet 5 mg  5 mg Oral QAM Fe Davis MD        fluticasone Laallison Patel) 50 mcg/act nasal spray 2 spray  2 spray Nasal Daily Fe Davis MD        formoterol (PERFOROMIST) nebulizer solution 20 mcg  20 mcg Nebulization BID Fe Davis MD        gabapentin (NEURONTIN) capsule 300 mg  300 mg Oral TID Fe Davis MD        guaiFENesin [facility]) oral solution 200 mg  200 mg Oral BID Fe Davis MD        ipratropium-albuterol (DUO-NEB) 0 5-2 5 mg/3 mL inhalation solution 3 mL  3 mL Nebulization Q4H PRN Fe Davis MD        LORazepam (ATIVAN) tablet 1 mg  1 mg Oral BID Fe Davis MD        midodrine (PROAMATINE) tablet 2 5 mg  2 5 mg Oral TID Fe Davis MD        OLANZapine (ZyPREXA) tablet 7 5 mg  7 5 mg Oral HS Fe Davis MD        pantoprazole (PROTONIX) EC tablet 40 mg  40 mg Oral BID Fe Davis MD        sodium chloride 0 9 % infusion  125 mL/hr Intravenous Continuous Fe Davis  mL/hr at 02/22/21 2031 125 mL/hr at 02/22/21 2031    tamsulosin (FLOMAX) capsule 0 4 mg  0 4 mg Oral Daily Fe Davis MD        traZODone (DESYREL) tablet 100 mg  100 mg Oral HS Fe Davis MD           Invasive Devices     Peripheral Intravenous Line            Peripheral IV 02/22/21 Right Antecubital less than 1 day                Lab, Imaging and other studies: I have personally reviewed pertinent reports      VTE Pharmacologic Prophylaxis: Enoxaparin (Lovenox)  VTE Mechanical Prophylaxis: sequential compression device    Jonathan Roach MD

## 2021-02-23 NOTE — PLAN OF CARE
Problem: Potential for Falls  Goal: Patient will remain free of falls  Description: INTERVENTIONS:  - Assess patient frequently for physical needs  -  Identify cognitive and physical deficits and behaviors that affect risk of falls  -  [place] fall precautions as indicated by assessment   - Educate patient/family on patient safety including physical limitations  - Instruct patient to call for assistance with activity based on assessment  - Modify environment to reduce risk of injury  - Consider OT/PT consult to assist with strengthening/mobility  Outcome: Progressing     Problem: Nutrition/Hydration-ADULT  Goal: Nutrient/Hydration intake appropriate for improving, restoring or maintaining nutritional needs  Description: Monitor and assess patient's nutrition/hydration status for malnutrition  Collaborate with interdisciplinary team and initiate plan and interventions as ordered  Monitor patient's weight and dietary intake as ordered or per policy  Utilize nutrition screening tool and intervene as necessary  Determine patient's food preferences and provide high-protein, high-caloric foods as appropriate       INTERVENTIONS:  - Monitor oral intake, urinary output, labs, and treatment plans  - Assess nutrition and hydration status and recommend course of action  - Evaluate amount of meals eaten  - Assist patient with eating if necessary   - Allow adequate time for meals  - Recommend/ encourage appropriate diets, oral nutritional supplements, and vitamin/mineral supplements  - Order, calculate, and assess calorie counts as needed  - Recommend, monitor, and adjust tube feedings and TPN/PPN based on assessed needs  - Assess need for intravenous fluids  - Provide specific nutrition/hydration education as appropriate  - Include patient/family/caregiver in decisions related to nutrition  Outcome: Progressing     Problem: PAIN - ADULT  Goal: Verbalizes/displays adequate comfort level or baseline comfort level  Description: Interventions:  - Encourage patient to monitor pain and request assistance  - Assess pain using appropriate pain scale  - Administer analgesics based on type and severity of pain and evaluate response  - Implement non-pharmacological measures as appropriate and evaluate response  - Consider cultural and social influences on pain and pain management  - Notify physician/advanced practitioner if interventions unsuccessful or patient reports new pain  Outcome: Progressing     Problem: INFECTION - ADULT  Goal: Absence or prevention of progression during hospitalization  Description: INTERVENTIONS:  - Assess and monitor for signs and symptoms of infection  - Monitor lab/diagnostic results  - Monitor all insertion sites, i e  indwelling lines, tubes, and drains  - Monitor endotracheal if appropriate and nasal secretions for changes in amount and color  - [place] appropriate cooling/warming therapies per order  - Administer medications as ordered  - Instruct and encourage patient and family to use good hand hygiene technique  - Identify and instruct in appropriate isolation precautions for identified infection/condition  Outcome: Progressing     Problem: SAFETY ADULT  Goal: Patient will remain free of falls  Description: INTERVENTIONS:  - Assess patient frequently for physical needs  -  Identify cognitive and physical deficits and behaviors that affect risk of falls    -  [place] fall precautions as indicated by assessment   - Educate patient/family on patient safety including physical limitations  - Instruct patient to call for assistance with activity based on assessment  - Modify environment to reduce risk of injury  - Consider OT/PT consult to assist with strengthening/mobility  Outcome: Progressing  Goal: Maintain or return to baseline ADL function  Description: INTERVENTIONS:  -  Assess patient's ability to carry out ADLs; assess patient's baseline for ADL function and identify physical deficits which impact ability to perform ADLs (bathing, care of mouth/teeth, toileting, grooming, dressing, etc )  - Assess/evaluate cause of self-care deficits   - Assess range of motion  - Assess patient's mobility; develop plan if impaired  - Assess patient's need for assistive devices and provide as appropriate  - Encourage maximum independence but intervene and supervise when necessary  - Involve family in performance of ADLs  - Assess for home care needs following discharge   - Consider OT consult to assist with ADL evaluation and planning for discharge  - Provide patient education as appropriate  Outcome: Progressing  Goal: Maintain or return mobility status to optimal level  Description: INTERVENTIONS:  - Assess patient's baseline mobility status (ambulation, transfers, stairs, etc )    - Identify cognitive and physical deficits and behaviors that affect mobility  - Identify mobility aids required to assist with transfers and/or ambulation (gait belt, sit-to-stand, lift, walker, cane, etc )  - [place] fall precautions as indicated by assessment  - Record patient progress and toleration of activity level on Mobility SBAR; progress patient to next Phase/Stage  - Instruct patient to call for assistance with activity based on assessment  - Consider rehabilitation consult to assist with strengthening/weightbearing, etc   Outcome: Progressing     Problem: DISCHARGE PLANNING  Goal: Discharge to home or other facility with appropriate resources  Description: INTERVENTIONS:  - Identify barriers to discharge w/patient and caregiver  - Arrange for needed discharge resources and transportation as appropriate  - Identify discharge learning needs (meds, wound care, etc )  - Arrange for interpretive services to assist at discharge as needed  - Refer to Case Management Department for coordinating discharge planning if the patient needs post-hospital services based on physician/advanced practitioner order or complex needs related to functional status, cognitive ability, or social support system  Outcome: Progressing     Problem: Knowledge Deficit  Goal: Patient/family/caregiver demonstrates understanding of disease process, treatment plan, medications, and discharge instructions  Description: Complete learning assessment and assess knowledge base    Interventions:  - Provide teaching at level of understanding  - Provide teaching via preferred learning methods  Outcome: Progressing

## 2021-02-24 VITALS
DIASTOLIC BLOOD PRESSURE: 64 MMHG | RESPIRATION RATE: 20 BRPM | WEIGHT: 195 LBS | BODY MASS INDEX: 22.56 KG/M2 | OXYGEN SATURATION: 97 % | TEMPERATURE: 97.2 F | HEART RATE: 57 BPM | SYSTOLIC BLOOD PRESSURE: 112 MMHG | HEIGHT: 78 IN

## 2021-02-24 LAB
ANION GAP SERPL CALCULATED.3IONS-SCNC: 8 MMOL/L (ref 4–13)
BASOPHILS # BLD MANUAL: 0 THOUSAND/UL (ref 0–0.1)
BASOPHILS NFR MAR MANUAL: 0 % (ref 0–1)
BUN SERPL-MCNC: 14 MG/DL (ref 5–25)
CALCIUM SERPL-MCNC: 8.3 MG/DL (ref 8.3–10.1)
CHLORIDE SERPL-SCNC: 105 MMOL/L (ref 100–108)
CO2 SERPL-SCNC: 27 MMOL/L (ref 21–32)
CREAT SERPL-MCNC: 1.12 MG/DL (ref 0.6–1.3)
EOSINOPHIL # BLD MANUAL: 0.22 THOUSAND/UL (ref 0–0.4)
EOSINOPHIL NFR BLD MANUAL: 4 % (ref 0–6)
ERYTHROCYTE [DISTWIDTH] IN BLOOD BY AUTOMATED COUNT: 13.1 % (ref 11.6–15.1)
GFR SERPL CREATININE-BSD FRML MDRD: 63 ML/MIN/1.73SQ M
GLUCOSE SERPL-MCNC: 100 MG/DL (ref 65–140)
HCT VFR BLD AUTO: 39.3 % (ref 36.5–49.3)
HGB BLD-MCNC: 13.1 G/DL (ref 12–17)
LYMPHOCYTES # BLD AUTO: 2.11 THOUSAND/UL (ref 0.6–4.47)
LYMPHOCYTES # BLD AUTO: 38 % (ref 14–44)
MAGNESIUM SERPL-MCNC: 1.7 MG/DL (ref 1.6–2.6)
MCH RBC QN AUTO: 31.1 PG (ref 26.8–34.3)
MCHC RBC AUTO-ENTMCNC: 33.3 G/DL (ref 31.4–37.4)
MCV RBC AUTO: 93 FL (ref 82–98)
MONOCYTES # BLD AUTO: 0.67 THOUSAND/UL (ref 0–1.22)
MONOCYTES NFR BLD: 12 % (ref 4–12)
NEUTROPHILS # BLD MANUAL: 2.5 THOUSAND/UL (ref 1.85–7.62)
NEUTS BAND NFR BLD MANUAL: 1 % (ref 0–8)
NEUTS SEG NFR BLD AUTO: 44 % (ref 43–75)
NRBC BLD AUTO-RTO: 0 /100 WBCS
PHOSPHATE SERPL-MCNC: 3.4 MG/DL (ref 2.3–4.1)
PLATELET # BLD AUTO: 133 THOUSANDS/UL (ref 149–390)
PLATELET BLD QL SMEAR: ABNORMAL
PMV BLD AUTO: 8.6 FL (ref 8.9–12.7)
POTASSIUM SERPL-SCNC: 4 MMOL/L (ref 3.5–5.3)
RBC # BLD AUTO: 4.21 MILLION/UL (ref 3.88–5.62)
RBC MORPH BLD: NORMAL
SODIUM SERPL-SCNC: 140 MMOL/L (ref 136–145)
TOTAL CELLS COUNTED SPEC: 100
VARIANT LYMPHS # BLD AUTO: 1 %
WBC # BLD AUTO: 5.56 THOUSAND/UL (ref 4.31–10.16)

## 2021-02-24 PROCEDURE — 84100 ASSAY OF PHOSPHORUS: CPT | Performed by: STUDENT IN AN ORGANIZED HEALTH CARE EDUCATION/TRAINING PROGRAM

## 2021-02-24 PROCEDURE — 83735 ASSAY OF MAGNESIUM: CPT | Performed by: STUDENT IN AN ORGANIZED HEALTH CARE EDUCATION/TRAINING PROGRAM

## 2021-02-24 PROCEDURE — 85007 BL SMEAR W/DIFF WBC COUNT: CPT | Performed by: STUDENT IN AN ORGANIZED HEALTH CARE EDUCATION/TRAINING PROGRAM

## 2021-02-24 PROCEDURE — NC001 PR NO CHARGE: Performed by: FAMILY MEDICINE

## 2021-02-24 PROCEDURE — 80048 BASIC METABOLIC PNL TOTAL CA: CPT | Performed by: STUDENT IN AN ORGANIZED HEALTH CARE EDUCATION/TRAINING PROGRAM

## 2021-02-24 PROCEDURE — 99239 HOSP IP/OBS DSCHRG MGMT >30: CPT | Performed by: FAMILY MEDICINE

## 2021-02-24 PROCEDURE — 94640 AIRWAY INHALATION TREATMENT: CPT

## 2021-02-24 PROCEDURE — 94760 N-INVAS EAR/PLS OXIMETRY 1: CPT

## 2021-02-24 PROCEDURE — 85027 COMPLETE CBC AUTOMATED: CPT | Performed by: STUDENT IN AN ORGANIZED HEALTH CARE EDUCATION/TRAINING PROGRAM

## 2021-02-24 RX ADMIN — PANTOPRAZOLE SODIUM 40 MG: 40 TABLET, DELAYED RELEASE ORAL at 16:13

## 2021-02-24 RX ADMIN — FAMOTIDINE 20 MG: 20 TABLET, FILM COATED ORAL at 09:11

## 2021-02-24 RX ADMIN — FLUTICASONE PROPIONATE 2 SPRAY: 50 SPRAY, METERED NASAL at 09:09

## 2021-02-24 RX ADMIN — MIDODRINE HYDROCHLORIDE 2.5 MG: 5 TABLET ORAL at 11:14

## 2021-02-24 RX ADMIN — TAMSULOSIN HYDROCHLORIDE 0.4 MG: 0.4 CAPSULE ORAL at 10:31

## 2021-02-24 RX ADMIN — GABAPENTIN 300 MG: 300 CAPSULE ORAL at 09:12

## 2021-02-24 RX ADMIN — FINASTERIDE 5 MG: 5 TABLET, FILM COATED ORAL at 09:11

## 2021-02-24 RX ADMIN — BUSPIRONE HYDROCHLORIDE 10 MG: 10 TABLET ORAL at 09:12

## 2021-02-24 RX ADMIN — MIDODRINE HYDROCHLORIDE 2.5 MG: 5 TABLET ORAL at 16:14

## 2021-02-24 RX ADMIN — MIDODRINE HYDROCHLORIDE 2.5 MG: 5 TABLET ORAL at 09:10

## 2021-02-24 RX ADMIN — ACETAMINOPHEN 975 MG: 325 TABLET, FILM COATED ORAL at 13:34

## 2021-02-24 RX ADMIN — ENOXAPARIN SODIUM 40 MG: 40 INJECTION SUBCUTANEOUS at 09:12

## 2021-02-24 RX ADMIN — DICLOFENAC SODIUM 2 G: 10 GEL TOPICAL at 09:09

## 2021-02-24 RX ADMIN — LORAZEPAM 1 MG: 1 TABLET ORAL at 09:09

## 2021-02-24 RX ADMIN — BUDESONIDE 0.5 MG: 0.5 INHALANT ORAL at 07:32

## 2021-02-24 RX ADMIN — PANTOPRAZOLE SODIUM 40 MG: 40 TABLET, DELAYED RELEASE ORAL at 06:55

## 2021-02-24 RX ADMIN — BUSPIRONE HYDROCHLORIDE 10 MG: 10 TABLET ORAL at 16:14

## 2021-02-24 RX ADMIN — GABAPENTIN 300 MG: 300 CAPSULE ORAL at 16:14

## 2021-02-24 RX ADMIN — CHOLESTYRAMINE 4 G: 4 POWDER, FOR SUSPENSION ORAL at 09:12

## 2021-02-24 RX ADMIN — ACETAMINOPHEN 975 MG: 325 TABLET, FILM COATED ORAL at 06:54

## 2021-02-24 RX ADMIN — FORMOTEROL FUMARATE DIHYDRATE 20 MCG: 20 SOLUTION RESPIRATORY (INHALATION) at 07:32

## 2021-02-24 NOTE — PLAN OF CARE
Problem: Potential for Falls  Goal: Patient will remain free of falls  Description: INTERVENTIONS:  - Assess patient frequently for physical needs  -  Identify cognitive and physical deficits and behaviors that affect risk of falls  -  [place] fall precautions as indicated by assessment   - Educate patient/family on patient safety including physical limitations  - Instruct patient to call for assistance with activity based on assessment  - Modify environment to reduce risk of injury  - Consider OT/PT consult to assist with strengthening/mobility  Outcome: Progressing     Problem: Nutrition/Hydration-ADULT  Goal: Nutrient/Hydration intake appropriate for improving, restoring or maintaining nutritional needs  Description: Monitor and assess patient's nutrition/hydration status for malnutrition  Collaborate with interdisciplinary team and initiate plan and interventions as ordered  Monitor patient's weight and dietary intake as ordered or per policy  Utilize nutrition screening tool and intervene as necessary  Determine patient's food preferences and provide high-protein, high-caloric foods as appropriate       INTERVENTIONS:  - Monitor oral intake, urinary output, labs, and treatment plans  - Assess nutrition and hydration status and recommend course of action  - Evaluate amount of meals eaten  - Assist patient with eating if necessary   - Allow adequate time for meals  - Recommend/ encourage appropriate diets, oral nutritional supplements, and vitamin/mineral supplements  - Assess need for intravenous fluids  - Provide specific nutrition/hydration education as appropriate  - Include patient/family/caregiver in decisions related to nutrition  Outcome: Progressing     Problem: PAIN - ADULT  Goal: Verbalizes/displays adequate comfort level or baseline comfort level  Description: Interventions:  - Encourage patient to monitor pain and request assistance  - Assess pain using appropriate pain scale  - Administer analgesics based on type and severity of pain and evaluate response  - Implement non-pharmacological measures as appropriate and evaluate response  - Consider cultural and social influences on pain and pain management  - Notify physician/advanced practitioner if interventions unsuccessful or patient reports new pain  Outcome: Progressing     Problem: INFECTION - ADULT  Goal: Absence or prevention of progression during hospitalization  Description: INTERVENTIONS:  - Assess and monitor for signs and symptoms of infection  - Monitor lab/diagnostic results  - Monitor all insertion sites, i e  indwelling lines, tubes, and drains  - [place] appropriate cooling/warming therapies per order  - Administer medications as ordered  - Instruct and encourage patient and family to use good hand hygiene technique  - Identify and instruct in appropriate isolation precautions for identified infection/condition  Outcome: Progressing     Problem: SAFETY ADULT  Goal: Patient will remain free of falls  Description: INTERVENTIONS:  - Assess patient frequently for physical needs  -  Identify cognitive and physical deficits and behaviors that affect risk of falls    -  [place] fall precautions as indicated by assessment   - Educate patient/family on patient safety including physical limitations  - Instruct patient to call for assistance with activity based on assessment  - Modify environment to reduce risk of injury  - Consider OT/PT consult to assist with strengthening/mobility  Outcome: Progressing  Goal: Maintain or return to baseline ADL function  Description: INTERVENTIONS:  -  Assess patient's ability to carry out ADLs; assess patient's baseline for ADL function and identify physical deficits which impact ability to perform ADLs (bathing, care of mouth/teeth, toileting, grooming, dressing, etc )  - Assess/evaluate cause of self-care deficits   - Assess range of motion  - Assess patient's mobility; develop plan if impaired  - Assess patient's need for assistive devices and provide as appropriate  - Encourage maximum independence but intervene and supervise when necessary  - Involve family in performance of ADLs  - Assess for home care needs following discharge   - Consider OT consult to assist with ADL evaluation and planning for discharge  - Provide patient education as appropriate  Outcome: Progressing  Goal: Maintain or return mobility status to optimal level  Description: INTERVENTIONS:  - Assess patient's baseline mobility status (ambulation, transfers, stairs, etc )    - Identify cognitive and physical deficits and behaviors that affect mobility  - Identify mobility aids required to assist with transfers and/or ambulation (gait belt, sit-to-stand, lift, walker, cane, etc )  - [place] fall precautions as indicated by assessment  - Record patient progress and toleration of activity level on Mobility SBAR; progress patient to next Phase/Stage  - Instruct patient to call for assistance with activity based on assessment  - Consider rehabilitation consult to assist with strengthening/weightbearing, etc   Outcome: Progressing     Problem: DISCHARGE PLANNING  Goal: Discharge to home or other facility with appropriate resources  Description: INTERVENTIONS:  - Identify barriers to discharge w/patient and caregiver  - Arrange for needed discharge resources and transportation as appropriate  - Identify discharge learning needs (meds, wound care, etc )  - Arrange for interpretive services to assist at discharge as needed  - Refer to Case Management Department for coordinating discharge planning if the patient needs post-hospital services based on physician/advanced practitioner order or complex needs related to functional status, cognitive ability, or social support system  Outcome: Progressing     Problem: Knowledge Deficit  Goal: Patient/family/caregiver demonstrates understanding of disease process, treatment plan, medications, and discharge instructions  Description: Complete learning assessment and assess knowledge base    Interventions:  - Provide teaching at level of understanding  - Provide teaching via preferred learning methods  Outcome: Progressing     Problem: Prexisting or High Potential for Compromised Skin Integrity  Goal: Skin integrity is maintained or improved  Description: INTERVENTIONS:  - Identify patients at risk for skin breakdown  - Assess and monitor skin integrity  - Assess and monitor nutrition and hydration status  - Monitor labs   - Assess for incontinence   - Turn and reposition patient  - Assist with mobility/ambulation  - Relieve pressure over bony prominences  - Avoid friction and shearing  - Provide appropriate hygiene as needed including keeping skin clean and dry  - Evaluate need for skin moisturizer/barrier cream  - Collaborate with interdisciplinary team   - Patient/family teaching  - Consider wound care consult   Outcome: Progressing     Problem: RESPIRATORY - ADULT  Goal: Achieves optimal ventilation and oxygenation  Description: INTERVENTIONS:  - Assess for changes in respiratory status  - Assess for changes in mentation and behavior  - Position to facilitate oxygenation and minimize respiratory effort  - Oxygen administered by appropriate delivery if ordered  - Initiate smoking cessation education as indicated  - Encourage broncho-pulmonary hygiene including cough, deep breathe, Incentive Spirometry  - Assess the need for suctioning and aspirate as needed  - Assess and instruct to report SOB or any respiratory difficulty  - Respiratory Therapy support as indicated  Outcome: Progressing

## 2021-02-24 NOTE — PROGRESS NOTES
[facility] Practice Progress Note - Mathew Crigler 67 y o  male MRN: [id number]    Unit/Bed#: [address] [place] [address] Encounter: [id number]      Assessment/Plan:    Weakness generalized  Assessment & Plan  Likely secondary to deconditioning  All lab work has come back unremarkable so far x-ray of right knee and hip still pending yet only appear to show age-related osteoarthritis on initial inspection  PT was consulted and recommended home with services     AAT (alpha-1-antitrypsin) deficiency ([address] )  Assessment & Plan  Follows with Pulmonary  · Continue with weekly Zemaira injections, budesonide, and performist giancarlo     Lung nodule  Assessment & Plan  Stable patient follows Dr Chandler Dean  · Chest CT 01/13/2021: left lower lobe pulmonary nodule is almost completely resolved with minimal residual groundglass opacity, also suggesting improvement of previous inflammatory or infectious process     COPD (chronic obstructive pulmonary disease) ([facility])  Assessment & Plan  · End-stage COPD, patient follows palliative care continue home regimen     BPH (benign prostatic hyperplasia)  Assessment & Plan  · Stable continue medications     Essential hypertension  Assessment & Plan  · Blood pressure stable on arrival  · Continue home medications  · Continue monitor BP per floor protocol     Gastroesophageal reflux disease  Assessment & Plan  · Stable continue home medication         Subjective:   Patient seen and examined at bedside resting comfortably in no acute distress  Objective:     Vitals: Blood pressure 132/66, pulse 62, temperature (!) 97 2 °F (36 2 °C), resp  rate 18, height 6' 5 5" (1 969 m), weight 88 5 kg (195 lb), SpO2 96 %  ,Body mass index is 22 83 kg/m²    Wt Readings from Last 3 Encounters:   02/23/21 88 5 kg (195 lb)   02/09/21 87 7 kg (193 lb 6 4 oz)   01/20/21 86 6 kg (191 lb)       Intake/Output Summary (Last 24 hours) at 2/24/2021 4453  Last data filed at 2/23/2021 1206  Gross per 24 hour   Intake 495 ml Output --   Net 495 ml       Physical Exam: General appearance: alert and oriented, in no acute distress  Head: Normocephalic, without obvious abnormality, atraumatic  Lungs: clear to auscultation bilaterally  Chest wall: no tenderness  Heart: regular rate and rhythm, S1, S2 normal, no murmur, click, rub or gallop  Extremities: extremities normal, warm and well-perfused; no cyanosis, clubbing, or edema  Skin: Skin color, texture, turgor normal  No rashes or lesions  Neurologic: Grossly normal     No results found for this or any previous visit (from the past 24 hour(s))      Current Facility-Administered Medications   Medication Dose Route Frequency Provider Last Rate Last Admin    acetaminophen (TYLENOL) tablet 975 mg  975 mg Oral Q8H [place] MD Comfort   975 mg at 02/23/21 2207    albuterol (PROVENTIL HFA,VENTOLIN HFA) inhaler 1 puff  1 puff Inhalation Q6H PRN Demar Reyes MD        budesonide (PULMICORT) inhalation solution 0 5 mg  0 5 mg Nebulization BID Demar Reyes MD   0 5 mg at 02/23/21 2132    busPIRone (BUSPAR) tablet 10 mg  10 mg Oral TID Demar Reyes MD   10 mg at 02/23/21 2208    cholestyramine sugar free (QUESTRAN LIGHT) packet 4 g  4 g Oral BID Demar Reyes MD   4 g at 02/23/21 1747    Diclofenac Sodium (VOLTAREN) 1 % topical gel 2 g  2 g Topical 4x Daily Demar Reyes MD   2 g at 02/23/21 2214    doxylamine (UNISON) tablet 25 mg  25 mg Oral HS PRN Demar Reyes MD        enoxaparin (LOVENOX) subcutaneous injection 40 mg  40 mg Subcutaneous Q24H [facility] Elier Chou MD   40 mg at 02/23/21 5689    famotidine (PEPCID) tablet 20 mg  20 mg Oral Daily Demar Reyes MD   20 mg at 02/23/21 2277    finasteride (PROSCAR) tablet 5 mg  5 mg Oral QAM Demar Reyes MD   5 mg at 02/23/21 0934    fluticasone (FLONASE) 50 mcg/act nasal spray 2 spray  2 spray Nasal Daily Justina Dukes MD   2 spray at 02/23/21 0861    formoterol (PERFOROMIST) nebulizer solution 20 mcg  20 mcg Nebulization BID Justina Dukes MD   20 mcg at 02/23/21 2310    gabapentin (NEURONTIN) capsule 300 mg  300 mg Oral TID Justina Dukes MD   300 mg at 02/23/21 2208    ipratropium-albuterol (DUO-NEB) 0 5-2 5 mg/3 mL inhalation solution 3 mL  3 mL Nebulization Q4H PRN Justina Dukes MD        LORazepam (ATIVAN) tablet 1 mg  1 mg Oral BID Justina Dukes MD   1 mg at 02/23/21 1747    midodrine (PROAMATINE) tablet 2 5 mg  2 5 mg Oral TID With [id number] Anderson Cano MD   2 5 mg at 02/23/21 1747    OLANZapine (ZyPREXA) tablet 7 5 mg  7 5 mg Oral HS Justina Dukes MD   7 5 mg at 02/23/21 2208    pantoprazole (PROTONIX) EC tablet 40 mg  40 mg Oral BID AC Justina Dukes MD   40 mg at 02/23/21 1747    tamsulosin (FLOMAX) capsule 0 4 mg  0 4 mg Oral Daily Justina Dukes MD   0 4 mg at 02/23/21 4709    traZODone (DESYREL) tablet 100 mg  100 mg Oral HS Justina Dukes MD   100 mg at 02/23/21 2208       Invasive Devices     Peripheral Intravenous Line            Peripheral IV 02/22/21 Right Antecubital 1 day                Lab, Imaging and other studies: I have personally reviewed pertinent reports      VTE Pharmacologic Prophylaxis: Enoxaparin (Lovenox)  VTE Mechanical Prophylaxis: sequential compression device    Nicole Warren MD

## 2021-02-25 ENCOUNTER — TRANSITIONAL CARE MANAGEMENT (OUTPATIENT)
Dept: FAMILY MEDICINE CLINIC | Facility: CLINIC | Age: 76
End: 2021-02-25

## 2021-02-25 LAB
ATRIAL RATE: 76 BPM
P AXIS: 27 DEGREES
PR INTERVAL: 158 MS
QRS AXIS: 34 DEGREES
QRSD INTERVAL: 100 MS
QT INTERVAL: 402 MS
QTC INTERVAL: 452 MS
T WAVE AXIS: 26 DEGREES
VENTRICULAR RATE: 76 BPM

## 2021-02-25 PROCEDURE — 93010 ELECTROCARDIOGRAM REPORT: CPT | Performed by: INTERNAL MEDICINE

## 2021-02-26 ENCOUNTER — OFFICE VISIT (OUTPATIENT)
Dept: PHYSICAL THERAPY | Facility: CLINIC | Age: 76
End: 2021-02-26
Payer: MEDICARE

## 2021-02-26 DIAGNOSIS — Z51.5 PALLIATIVE CARE PATIENT: ICD-10-CM

## 2021-02-26 DIAGNOSIS — R26.81 UNSTEADY GAIT: Primary | ICD-10-CM

## 2021-02-26 DIAGNOSIS — J43.8 OTHER EMPHYSEMA (HCC): ICD-10-CM

## 2021-02-26 PROCEDURE — 97110 THERAPEUTIC EXERCISES: CPT | Performed by: PHYSICAL THERAPIST

## 2021-02-26 PROCEDURE — 97530 THERAPEUTIC ACTIVITIES: CPT | Performed by: PHYSICAL THERAPIST

## 2021-02-26 NOTE — PROGRESS NOTES
Daily Note     Today's date: 2021  Patient name: Olivia Urbina  : 1945  MRN: [id number]  Referring provider: Kirk Odell MD  Dx:   Encounter Diagnosis     ICD-10-CM    1  Unsteady gait  R26 81    2  Palliative care patient  Z51 5    3  Other emphysema ([facility] [address] )  J43 8        Start Time: 0930  Stop Time: 1015  Total time in clinic (min): 45 minutes    Subjective: Client reports feeling "weak " He also reported "forgetting his cane in the garage "  Baseline HR 78 bpm O2 96% on 3L O2    Objective: See treatment diary below    TA:  - Extensive chart review completed due to recent hospitalization on 21 due to weakness with D/C home on   Diagnostic imaging of hips/pelvis and knees According to discharge paperwork, client is "stable" and was seen by PT/OT in the hospital and home/outpatient PT services were recommended  Discussion held with client about benefits of home health PT however he currently states he would "rather try outpatient right now "  - Ambulatory transfer from transport chair to his personal car with CS  - MMT and transfers assessment completed prior to session without any noted changes from previous session (IE)  TE:   - LAQ 3X10 with 2 sec holds  - ankle pumps 30x   - STS x 5 with BUE support (to/from //Bars)  - Standing marches 10x BLE within parallel bars with CG by this PT      Assessment: Client limited by fatigue this session  He forgot his cane in the garage today  This PT offered for him to borrow a SC from the clinic, however he declined stating "mine will be in reach once I get home and park my car " All activities completed to tolerance  This PT escorted client out to his car using transport chair to maximize his safety and endurance  This PT reinforced importance to utilize RW in home and community  He prefers SC for community due to difficulty with RW management in/out of his car  Plan: Continue per plan of care  Precautions: On supplemental O2 (3L)  Palliative care    Outcome Measures 2/17/2021         5XSTS 63 43 sec, SC  BUE support CG-Min A  Елена 1  92% O2 3L         30 sec chair rise 2 with min A         TUG 40 sec SC Елена 3         10MWT 43 2 sec         2MWT Complete next session

## 2021-03-01 ENCOUNTER — IMMUNIZATIONS (OUTPATIENT)
Dept: FAMILY MEDICINE CLINIC | Facility: HOSPITAL | Age: 76
End: 2021-03-01

## 2021-03-01 DIAGNOSIS — Z23 ENCOUNTER FOR IMMUNIZATION: Primary | ICD-10-CM

## 2021-03-01 PROCEDURE — 0012A SARS-COV-2 / COVID-19 MRNA VACCINE (MODERNA) 100 MCG: CPT

## 2021-03-01 PROCEDURE — 91301 SARS-COV-2 / COVID-19 MRNA VACCINE (MODERNA) 100 MCG: CPT

## 2021-03-02 ENCOUNTER — HOSPITAL ENCOUNTER (OUTPATIENT)
Facility: HOSPITAL | Age: 76
Setting detail: OBSERVATION
Discharge: NURSING, REHAB OR LONG-TERM CARE | End: 2021-03-03
Attending: EMERGENCY MEDICINE | Admitting: FAMILY MEDICINE
Payer: MEDICARE

## 2021-03-02 ENCOUNTER — APPOINTMENT (EMERGENCY)
Dept: RADIOLOGY | Facility: HOSPITAL | Age: 76
End: 2021-03-02
Payer: MEDICARE

## 2021-03-02 ENCOUNTER — OFFICE VISIT (OUTPATIENT)
Dept: PHYSICAL THERAPY | Facility: CLINIC | Age: 76
End: 2021-03-02
Payer: MEDICARE

## 2021-03-02 DIAGNOSIS — J43.8 OTHER EMPHYSEMA (HCC): ICD-10-CM

## 2021-03-02 DIAGNOSIS — J96.11 CHRONIC RESPIRATORY FAILURE WITH HYPOXIA (HCC): ICD-10-CM

## 2021-03-02 DIAGNOSIS — R55 SYNCOPE: ICD-10-CM

## 2021-03-02 DIAGNOSIS — Z51.5 PALLIATIVE CARE PATIENT: ICD-10-CM

## 2021-03-02 DIAGNOSIS — R26.81 UNSTEADY GAIT: Primary | ICD-10-CM

## 2021-03-02 DIAGNOSIS — R26.2 AMBULATORY DYSFUNCTION: ICD-10-CM

## 2021-03-02 DIAGNOSIS — R53.1 WEAKNESS: Primary | ICD-10-CM

## 2021-03-02 LAB
ANION GAP SERPL CALCULATED.3IONS-SCNC: 8 MMOL/L (ref 4–13)
BASOPHILS # BLD AUTO: 0.05 THOUSANDS/ΜL (ref 0–0.1)
BASOPHILS NFR BLD AUTO: 1 % (ref 0–1)
BILIRUB UR QL STRIP: NEGATIVE
BUN SERPL-MCNC: 12 MG/DL (ref 5–25)
CALCIUM SERPL-MCNC: 8.7 MG/DL (ref 8.3–10.1)
CHLORIDE SERPL-SCNC: 102 MMOL/L (ref 100–108)
CK SERPL-CCNC: 80 U/L (ref 39–308)
CLARITY UR: CLEAR
CO2 SERPL-SCNC: 26 MMOL/L (ref 21–32)
COLOR UR: NORMAL
CREAT SERPL-MCNC: 1.09 MG/DL (ref 0.6–1.3)
EOSINOPHIL # BLD AUTO: 0.21 THOUSAND/ΜL (ref 0–0.61)
EOSINOPHIL NFR BLD AUTO: 3 % (ref 0–6)
ERYTHROCYTE [DISTWIDTH] IN BLOOD BY AUTOMATED COUNT: 13.1 % (ref 11.6–15.1)
FLUAV RNA RESP QL NAA+PROBE: NEGATIVE
FLUBV RNA RESP QL NAA+PROBE: NEGATIVE
GFR SERPL CREATININE-BSD FRML MDRD: 65 ML/MIN/1.73SQ M
GLUCOSE SERPL-MCNC: 115 MG/DL (ref 65–140)
GLUCOSE UR STRIP-MCNC: NEGATIVE MG/DL
HCT VFR BLD AUTO: 41.7 % (ref 36.5–49.3)
HGB BLD-MCNC: 13.6 G/DL (ref 12–17)
HGB UR QL STRIP.AUTO: NEGATIVE
IMM GRANULOCYTES # BLD AUTO: 0.03 THOUSAND/UL (ref 0–0.2)
IMM GRANULOCYTES NFR BLD AUTO: 0 % (ref 0–2)
KETONES UR STRIP-MCNC: NEGATIVE MG/DL
LEUKOCYTE ESTERASE UR QL STRIP: NEGATIVE
LYMPHOCYTES # BLD AUTO: 1.4 THOUSANDS/ΜL (ref 0.6–4.47)
LYMPHOCYTES NFR BLD AUTO: 20 % (ref 14–44)
MAGNESIUM SERPL-MCNC: 1.7 MG/DL (ref 1.6–2.6)
MCH RBC QN AUTO: 31.3 PG (ref 26.8–34.3)
MCHC RBC AUTO-ENTMCNC: 32.6 G/DL (ref 31.4–37.4)
MCV RBC AUTO: 96 FL (ref 82–98)
MONOCYTES # BLD AUTO: 0.62 THOUSAND/ΜL (ref 0.17–1.22)
MONOCYTES NFR BLD AUTO: 9 % (ref 4–12)
NEUTROPHILS # BLD AUTO: 4.72 THOUSANDS/ΜL (ref 1.85–7.62)
NEUTS SEG NFR BLD AUTO: 67 % (ref 43–75)
NITRITE UR QL STRIP: NEGATIVE
NRBC BLD AUTO-RTO: 0 /100 WBCS
PH UR STRIP.AUTO: 8 [PH]
PLATELET # BLD AUTO: 135 THOUSANDS/UL (ref 149–390)
PMV BLD AUTO: 9.1 FL (ref 8.9–12.7)
POTASSIUM SERPL-SCNC: 4.3 MMOL/L (ref 3.5–5.3)
PROT UR STRIP-MCNC: NEGATIVE MG/DL
RBC # BLD AUTO: 4.35 MILLION/UL (ref 3.88–5.62)
RSV RNA RESP QL NAA+PROBE: NEGATIVE
SARS-COV-2 RNA RESP QL NAA+PROBE: NEGATIVE
SODIUM SERPL-SCNC: 136 MMOL/L (ref 136–145)
SP GR UR STRIP.AUTO: 1.02 (ref 1–1.03)
TROPONIN I SERPL-MCNC: <0.02 NG/ML
TSH SERPL DL<=0.05 MIU/L-ACNC: 0.91 UIU/ML (ref 0.36–3.74)
UROBILINOGEN UR QL STRIP.AUTO: 0.2 E.U./DL
WBC # BLD AUTO: 7.03 THOUSAND/UL (ref 4.31–10.16)

## 2021-03-02 PROCEDURE — 36415 COLL VENOUS BLD VENIPUNCTURE: CPT | Performed by: EMERGENCY MEDICINE

## 2021-03-02 PROCEDURE — 99285 EMERGENCY DEPT VISIT HI MDM: CPT

## 2021-03-02 PROCEDURE — 96360 HYDRATION IV INFUSION INIT: CPT

## 2021-03-02 PROCEDURE — 84484 ASSAY OF TROPONIN QUANT: CPT | Performed by: EMERGENCY MEDICINE

## 2021-03-02 PROCEDURE — 0241U HB NFCT DS VIR RESP RNA 4 TRGT: CPT | Performed by: EMERGENCY MEDICINE

## 2021-03-02 PROCEDURE — 84443 ASSAY THYROID STIM HORMONE: CPT | Performed by: EMERGENCY MEDICINE

## 2021-03-02 PROCEDURE — 96361 HYDRATE IV INFUSION ADD-ON: CPT

## 2021-03-02 PROCEDURE — 99285 EMERGENCY DEPT VISIT HI MDM: CPT | Performed by: EMERGENCY MEDICINE

## 2021-03-02 PROCEDURE — 71045 X-RAY EXAM CHEST 1 VIEW: CPT

## 2021-03-02 PROCEDURE — 93005 ELECTROCARDIOGRAM TRACING: CPT

## 2021-03-02 PROCEDURE — 85025 COMPLETE CBC W/AUTO DIFF WBC: CPT | Performed by: EMERGENCY MEDICINE

## 2021-03-02 PROCEDURE — 94760 N-INVAS EAR/PLS OXIMETRY 1: CPT

## 2021-03-02 PROCEDURE — 80048 BASIC METABOLIC PNL TOTAL CA: CPT | Performed by: EMERGENCY MEDICINE

## 2021-03-02 PROCEDURE — 81003 URINALYSIS AUTO W/O SCOPE: CPT | Performed by: EMERGENCY MEDICINE

## 2021-03-02 PROCEDURE — 94640 AIRWAY INHALATION TREATMENT: CPT

## 2021-03-02 PROCEDURE — 1124F ACP DISCUSS-NO DSCNMKR DOCD: CPT | Performed by: EMERGENCY MEDICINE

## 2021-03-02 PROCEDURE — 83735 ASSAY OF MAGNESIUM: CPT | Performed by: EMERGENCY MEDICINE

## 2021-03-02 PROCEDURE — 82550 ASSAY OF CK (CPK): CPT | Performed by: EMERGENCY MEDICINE

## 2021-03-02 PROCEDURE — 97110 THERAPEUTIC EXERCISES: CPT | Performed by: PHYSICAL THERAPIST

## 2021-03-02 RX ORDER — FAMOTIDINE 20 MG/1
20 TABLET, FILM COATED ORAL DAILY
Status: DISCONTINUED | OUTPATIENT
Start: 2021-03-03 | End: 2021-03-03 | Stop reason: HOSPADM

## 2021-03-02 RX ORDER — ACETAMINOPHEN 325 MG/1
650 TABLET ORAL EVERY 6 HOURS PRN
Status: DISCONTINUED | OUTPATIENT
Start: 2021-03-02 | End: 2021-03-03 | Stop reason: HOSPADM

## 2021-03-02 RX ORDER — OLANZAPINE 2.5 MG/1
7.5 TABLET ORAL
Status: DISCONTINUED | OUTPATIENT
Start: 2021-03-02 | End: 2021-03-03 | Stop reason: HOSPADM

## 2021-03-02 RX ORDER — FORMOTEROL FUMARATE 20 UG/2ML
20 SOLUTION RESPIRATORY (INHALATION) 2 TIMES DAILY
Status: DISCONTINUED | OUTPATIENT
Start: 2021-03-02 | End: 2021-03-03 | Stop reason: HOSPADM

## 2021-03-02 RX ORDER — IPRATROPIUM BROMIDE AND ALBUTEROL SULFATE 2.5; .5 MG/3ML; MG/3ML
3 SOLUTION RESPIRATORY (INHALATION) EVERY 6 HOURS PRN
Status: DISCONTINUED | OUTPATIENT
Start: 2021-03-02 | End: 2021-03-03 | Stop reason: HOSPADM

## 2021-03-02 RX ORDER — TAMSULOSIN HYDROCHLORIDE 0.4 MG/1
0.4 CAPSULE ORAL DAILY
Status: DISCONTINUED | OUTPATIENT
Start: 2021-03-03 | End: 2021-03-03 | Stop reason: HOSPADM

## 2021-03-02 RX ORDER — MIDODRINE HYDROCHLORIDE 5 MG/1
2.5 TABLET ORAL 3 TIMES DAILY
Status: DISCONTINUED | OUTPATIENT
Start: 2021-03-02 | End: 2021-03-03 | Stop reason: HOSPADM

## 2021-03-02 RX ORDER — GABAPENTIN 300 MG/1
300 CAPSULE ORAL 3 TIMES DAILY
Status: DISCONTINUED | OUTPATIENT
Start: 2021-03-02 | End: 2021-03-03 | Stop reason: HOSPADM

## 2021-03-02 RX ORDER — CHOLESTYRAMINE LIGHT 4 G/5.7G
4 POWDER, FOR SUSPENSION ORAL 2 TIMES DAILY
Status: DISCONTINUED | OUTPATIENT
Start: 2021-03-02 | End: 2021-03-03 | Stop reason: HOSPADM

## 2021-03-02 RX ORDER — PANTOPRAZOLE SODIUM 40 MG/1
40 TABLET, DELAYED RELEASE ORAL 2 TIMES DAILY
Status: DISCONTINUED | OUTPATIENT
Start: 2021-03-02 | End: 2021-03-03 | Stop reason: HOSPADM

## 2021-03-02 RX ORDER — BUDESONIDE 0.5 MG/2ML
0.5 INHALANT ORAL 2 TIMES DAILY
Status: DISCONTINUED | OUTPATIENT
Start: 2021-03-02 | End: 2021-03-03 | Stop reason: HOSPADM

## 2021-03-02 RX ORDER — BUSPIRONE HYDROCHLORIDE 10 MG/1
10 TABLET ORAL 3 TIMES DAILY
Status: DISCONTINUED | OUTPATIENT
Start: 2021-03-02 | End: 2021-03-03 | Stop reason: HOSPADM

## 2021-03-02 RX ORDER — ALBUTEROL SULFATE 90 UG/1
1 AEROSOL, METERED RESPIRATORY (INHALATION) EVERY 6 HOURS PRN
Status: DISCONTINUED | OUTPATIENT
Start: 2021-03-02 | End: 2021-03-03 | Stop reason: HOSPADM

## 2021-03-02 RX ORDER — LORAZEPAM 1 MG/1
1 TABLET ORAL 2 TIMES DAILY
Status: DISCONTINUED | OUTPATIENT
Start: 2021-03-02 | End: 2021-03-03 | Stop reason: HOSPADM

## 2021-03-02 RX ORDER — FINASTERIDE 5 MG/1
5 TABLET, FILM COATED ORAL EVERY MORNING
Status: DISCONTINUED | OUTPATIENT
Start: 2021-03-03 | End: 2021-03-03 | Stop reason: HOSPADM

## 2021-03-02 RX ORDER — FLUTICASONE PROPIONATE 50 MCG
2 SPRAY, SUSPENSION (ML) NASAL DAILY
Status: DISCONTINUED | OUTPATIENT
Start: 2021-03-03 | End: 2021-03-03 | Stop reason: HOSPADM

## 2021-03-02 RX ORDER — TRAZODONE HYDROCHLORIDE 50 MG/1
100 TABLET ORAL
Status: DISCONTINUED | OUTPATIENT
Start: 2021-03-02 | End: 2021-03-03 | Stop reason: HOSPADM

## 2021-03-02 RX ADMIN — BUDESONIDE 0.5 MG: 0.5 INHALANT ORAL at 23:34

## 2021-03-02 RX ADMIN — SODIUM CHLORIDE 500 ML: 0.9 INJECTION, SOLUTION INTRAVENOUS at 17:45

## 2021-03-02 NOTE — PROGRESS NOTES
Daily Note     Today's date: 3/2/2021  Patient name: Devi Flynn  : 1945  MRN: [id number]  Referring provider: Alem Dixon MD  Dx:   Encounter Diagnosis     ICD-10-CM    1  Unsteady gait  R26 81    2  Palliative care patient  Z51 5    3  Other emphysema ([facility] [address] )  J43 8                   Subjective: Client reports feeling "weak and tired " He reports getting 2nd dose of COVID-19 vaccine on 3/1/21  Patient reports to therapy on 3L of oxygen  Objective: See treatment diary below    Pre-exercise--HR: 83 bpm, Oxygen: 97%  Post-exercise--HR: 82 bpm, Oxygen: 98%    TE:   - LAQ 2X10 with 3 sec holds  - Heel/Toe raises: 10x2   - STS x 5 with BUE support, Adolfo  - Seated marches: 10x2, GTB  - Ball Squeeze: 10x2, 3 sec hold  - Seated Clamshells: 10x2, GTB      Assessment: Patient tolerated treatment fair today  Patient reported increased fatigue today due to receiving 2nd dose of COVID-19 vaccine  Due to increased fatigue, treatment focused on seated exercises and long rest breaks between exercises  STS exercises required Adolfo from PT and verbal cueing to improve knee and hip extension  Patient will benefit from skilled PT services to increase strength, improve balance, and increase functional mobility  Plan: Continue per plan of care  Precautions: On supplemental O2 (3L)   Palliative care    Outcome Measures 2021         5XSTS 63 43 sec, SC  BUE support CG-Min A  Елена 1  92% O2 3L         30 sec chair rise 2 with min A         TUG 40 sec SC Елена 3         10MWT 43 2 sec         2MWT Complete next session

## 2021-03-03 ENCOUNTER — PATIENT OUTREACH (OUTPATIENT)
Dept: FAMILY MEDICINE CLINIC | Facility: CLINIC | Age: 76
End: 2021-03-03

## 2021-03-03 VITALS
TEMPERATURE: 98.6 F | OXYGEN SATURATION: 98 % | DIASTOLIC BLOOD PRESSURE: 89 MMHG | SYSTOLIC BLOOD PRESSURE: 166 MMHG | RESPIRATION RATE: 16 BRPM | HEART RATE: 67 BPM | BODY MASS INDEX: 22.75 KG/M2 | HEIGHT: 77 IN | WEIGHT: 192.68 LBS

## 2021-03-03 LAB
ANION GAP SERPL CALCULATED.3IONS-SCNC: 7 MMOL/L (ref 4–13)
BASOPHILS # BLD AUTO: 0.05 THOUSANDS/ΜL (ref 0–0.1)
BASOPHILS NFR BLD AUTO: 1 % (ref 0–1)
BUN SERPL-MCNC: 11 MG/DL (ref 5–25)
CALCIUM SERPL-MCNC: 8.6 MG/DL (ref 8.3–10.1)
CHLORIDE SERPL-SCNC: 104 MMOL/L (ref 100–108)
CO2 SERPL-SCNC: 27 MMOL/L (ref 21–32)
CREAT SERPL-MCNC: 1.04 MG/DL (ref 0.6–1.3)
EOSINOPHIL # BLD AUTO: 0.33 THOUSAND/ΜL (ref 0–0.61)
EOSINOPHIL NFR BLD AUTO: 5 % (ref 0–6)
ERYTHROCYTE [DISTWIDTH] IN BLOOD BY AUTOMATED COUNT: 13.1 % (ref 11.6–15.1)
GFR SERPL CREATININE-BSD FRML MDRD: 69 ML/MIN/1.73SQ M
GLUCOSE SERPL-MCNC: 101 MG/DL (ref 65–140)
HCT VFR BLD AUTO: 40 % (ref 36.5–49.3)
HGB BLD-MCNC: 13.2 G/DL (ref 12–17)
IMM GRANULOCYTES # BLD AUTO: 0.01 THOUSAND/UL (ref 0–0.2)
IMM GRANULOCYTES NFR BLD AUTO: 0 % (ref 0–2)
LYMPHOCYTES # BLD AUTO: 2.01 THOUSANDS/ΜL (ref 0.6–4.47)
LYMPHOCYTES NFR BLD AUTO: 33 % (ref 14–44)
MAGNESIUM SERPL-MCNC: 1.8 MG/DL (ref 1.6–2.6)
MCH RBC QN AUTO: 31.1 PG (ref 26.8–34.3)
MCHC RBC AUTO-ENTMCNC: 33 G/DL (ref 31.4–37.4)
MCV RBC AUTO: 94 FL (ref 82–98)
MONOCYTES # BLD AUTO: 0.61 THOUSAND/ΜL (ref 0.17–1.22)
MONOCYTES NFR BLD AUTO: 10 % (ref 4–12)
NEUTROPHILS # BLD AUTO: 3.09 THOUSANDS/ΜL (ref 1.85–7.62)
NEUTS SEG NFR BLD AUTO: 51 % (ref 43–75)
NRBC BLD AUTO-RTO: 0 /100 WBCS
PHOSPHATE SERPL-MCNC: 3.1 MG/DL (ref 2.3–4.1)
PLATELET # BLD AUTO: 114 THOUSANDS/UL (ref 149–390)
PMV BLD AUTO: 8.9 FL (ref 8.9–12.7)
POTASSIUM SERPL-SCNC: 3.8 MMOL/L (ref 3.5–5.3)
RBC # BLD AUTO: 4.25 MILLION/UL (ref 3.88–5.62)
SODIUM SERPL-SCNC: 138 MMOL/L (ref 136–145)
WBC # BLD AUTO: 6.1 THOUSAND/UL (ref 4.31–10.16)

## 2021-03-03 PROCEDURE — 97163 PT EVAL HIGH COMPLEX 45 MIN: CPT

## 2021-03-03 PROCEDURE — 85025 COMPLETE CBC W/AUTO DIFF WBC: CPT | Performed by: STUDENT IN AN ORGANIZED HEALTH CARE EDUCATION/TRAINING PROGRAM

## 2021-03-03 PROCEDURE — 84100 ASSAY OF PHOSPHORUS: CPT | Performed by: STUDENT IN AN ORGANIZED HEALTH CARE EDUCATION/TRAINING PROGRAM

## 2021-03-03 PROCEDURE — 97535 SELF CARE MNGMENT TRAINING: CPT

## 2021-03-03 PROCEDURE — 97110 THERAPEUTIC EXERCISES: CPT

## 2021-03-03 PROCEDURE — 80048 BASIC METABOLIC PNL TOTAL CA: CPT | Performed by: STUDENT IN AN ORGANIZED HEALTH CARE EDUCATION/TRAINING PROGRAM

## 2021-03-03 PROCEDURE — 83735 ASSAY OF MAGNESIUM: CPT | Performed by: STUDENT IN AN ORGANIZED HEALTH CARE EDUCATION/TRAINING PROGRAM

## 2021-03-03 PROCEDURE — NC001 PR NO CHARGE: Performed by: FAMILY MEDICINE

## 2021-03-03 PROCEDURE — 94760 N-INVAS EAR/PLS OXIMETRY 1: CPT

## 2021-03-03 PROCEDURE — 99218 PR INITIAL OBSERVATION CARE/DAY 30 MINUTES: CPT | Performed by: FAMILY MEDICINE

## 2021-03-03 PROCEDURE — 97167 OT EVAL HIGH COMPLEX 60 MIN: CPT

## 2021-03-03 PROCEDURE — 94640 AIRWAY INHALATION TREATMENT: CPT

## 2021-03-03 RX ADMIN — MIDODRINE HYDROCHLORIDE 2.5 MG: 5 TABLET ORAL at 08:37

## 2021-03-03 RX ADMIN — BUDESONIDE 0.5 MG: 0.5 INHALANT ORAL at 07:23

## 2021-03-03 RX ADMIN — FAMOTIDINE 20 MG: 20 TABLET ORAL at 08:34

## 2021-03-03 RX ADMIN — GABAPENTIN 300 MG: 300 CAPSULE ORAL at 08:35

## 2021-03-03 RX ADMIN — LORAZEPAM 1 MG: 1 TABLET ORAL at 00:11

## 2021-03-03 RX ADMIN — GABAPENTIN 300 MG: 300 CAPSULE ORAL at 00:11

## 2021-03-03 RX ADMIN — CHOLESTYRAMINE 4 G: 4 POWDER, FOR SUSPENSION ORAL at 00:11

## 2021-03-03 RX ADMIN — PANTOPRAZOLE SODIUM 40 MG: 40 TABLET, DELAYED RELEASE ORAL at 00:11

## 2021-03-03 RX ADMIN — LORAZEPAM 1 MG: 1 TABLET ORAL at 08:33

## 2021-03-03 RX ADMIN — BUSPIRONE HYDROCHLORIDE 10 MG: 10 TABLET ORAL at 08:34

## 2021-03-03 RX ADMIN — ENOXAPARIN SODIUM 40 MG: 40 INJECTION SUBCUTANEOUS at 08:41

## 2021-03-03 RX ADMIN — FORMOTEROL FUMARATE DIHYDRATE 20 MCG: 20 SOLUTION RESPIRATORY (INHALATION) at 08:21

## 2021-03-03 RX ADMIN — OLANZAPINE 7.5 MG: 2.5 TABLET, FILM COATED ORAL at 00:11

## 2021-03-03 RX ADMIN — PANTOPRAZOLE SODIUM 40 MG: 40 TABLET, DELAYED RELEASE ORAL at 08:35

## 2021-03-03 RX ADMIN — BUSPIRONE HYDROCHLORIDE 10 MG: 10 TABLET ORAL at 00:11

## 2021-03-03 RX ADMIN — MIDODRINE HYDROCHLORIDE 2.5 MG: 5 TABLET ORAL at 00:11

## 2021-03-03 RX ADMIN — CHOLESTYRAMINE 4 G: 4 POWDER, FOR SUSPENSION ORAL at 08:40

## 2021-03-03 RX ADMIN — FLUTICASONE PROPIONATE 2 SPRAY: 50 SPRAY, METERED NASAL at 08:38

## 2021-03-03 RX ADMIN — TAMSULOSIN HYDROCHLORIDE 0.4 MG: 0.4 CAPSULE ORAL at 08:34

## 2021-03-03 RX ADMIN — FINASTERIDE 5 MG: 5 TABLET, FILM COATED ORAL at 08:37

## 2021-03-03 RX ADMIN — TRAZODONE HYDROCHLORIDE 100 MG: 50 TABLET ORAL at 00:11

## 2021-03-03 NOTE — CASE MANAGEMENT
SW following to assist with DCP  STR placement is planned  [place] [facility]  [place] and pt has been offered bed at [facility]  No bed available at [facility] facility  Daughter received call [place] [facility] [place]  Pt's daughter has decided to accept bed at [facility]  [facility] and is requesting transfer when discharged  SW notified Dr Addison Ward of plan and availability  Discharge anticipated today  [facility] contacted to arrange transport  Provider and  time pending  SW will follow

## 2021-03-03 NOTE — H&P
H&P Exam - Ivania Huddleston 67 y o  male MRN: [id number]    Unit/Bed#: ED 07 Encounter: [id number]      67 male w PMH of alpha-1 antitrypsin deficiency, severe COPD end-stage, BPH, pulmonary HTN presents complaining of generalized weakness  Patient will be admitted for observation under Dr Waldo Moran, expected length of stay less than 2 midnights  Assessment/Plan     * Weakness generalized  Assessment & Plan  · Patient presents with worsening ongoing generalized weakness  Was recently admitted for this same complaint  Was following with PT today and complained of feeling weak and tired  Per PT note from today he had 2nd COVID vaccine shot yesterday; possibly feeling tired/weak and thigh pain secondary to side effects of COVID vaccine  · ER: TSH 0 9, Na 136, K 4 3, CR 1 09, mg 1 7, CK WNL, trop neg, WBC 7, HB 13 6, COVID/flu/RSV neg, IV  X 1  · PT/OT consult appreciate recommendations  · Case management  · Fall precautions in place    COPD (chronic obstructive pulmonary disease) ([facility] [address] )  Assessment & Plan  · Patient has end-stage COPD, follows palliative care  · Continue home inhalers    Essential hypertension  Assessment & Plan  · BP stable on admission  · Continue home medications  · Continue monitor BP per floor protocol    Gastroesophageal reflux disease  Assessment & Plan  · Stable continue home medication    AAT (alpha-1-antitrypsin) deficiency ([facility])  Assessment & Plan  · Stable at this time falls pulmonology  · Continue weekly Zemaira injections budesonide and Perforomist nebs    GLOBAL:  Replete electrolytes as needed  SCDs and Lovenox  Cardiac diet  Hep Lock     History of Present Illness     HPI:  Ivania Huddleston is a 67 y o  male who presents with PMH of alpha-1 antitrypsin deficiency, severe COPD end-stage on home oxygen 3 L, BPH, pulmonary HTN presents complaining of generalized weakness that has been ongoing for quite some time now   Reports that today at 10 am he was sitting down and tried to get up but could not move his legs  Reports thigh pain when attempting to move his legs  Did not fall, no LOC  ER:  IV  mL X 1    PCP: Rochelle Uribe MD    Review of Systems   Constitutional: Negative for chills and fever  Eyes: Negative  Respiratory: Negative for cough, chest tightness and shortness of breath  Cardiovascular: Negative for chest pain and palpitations  Gastrointestinal: Positive for diarrhea  Negative for abdominal pain, nausea and vomiting  Genitourinary: Negative for dysuria  Neurological: Positive for weakness and light-headedness  Negative for dizziness, numbness and headaches  Historical Information   Past Medical History:   Diagnosis Date    Anesthesia complication     Difficult to wake up    Arthritis     BPH (benign prostatic hyperplasia)     urinary frequency    Cancer (HCC)     basal cell neck, face    COPD exacerbation ([facility] [address] ) 12/29/2019    Full dentures     Hiatal hernia     History of methicillin resistant staphylococcus aureus (MRSA)     10/11/2018 MRSA (nares) positive    Irritable bowel syndrome     Kidney stone     at least 7 episodes    Liver disease     Alpha 1- enzyme deficiency - diagnosed 2002  has been on weekly replacement therapy since then    Pulmonary emphysema ([facility] [address] )     1/25/15  FEV1 - 2 45 liters or 59% of predicted    RSV infection 12/2017    Wears glasses     for driving only     Past Surgical History:   Procedure Laterality Date    BACK SURGERY  2008    discectomy    COLONOSCOPY      COLONOSCOPY N/A 3/10/2017    Procedure: Amber Figueroa;  Surgeon: Majo Yoder MD;  Location: [place] [address] GI LAB; Service:    Anton Sa      removal of kidney stones    ESOPHAGOGASTRODUODENOSCOPY N/A 3/10/2017    Procedure: ESOPHAGOGASTRODUODENOSCOPY (EGD); Surgeon: Majo Yoder MD;  Location: [facility] GI LAB;   Service:     LITHOTRIPSY      [place] ESOPHAGOGASTRODUODENOSCOPY TRANSORAL DIAGNOSTIC N/A 11/20/2018    Procedure: ESOPHAGOGASTRODUODENOSCOPY (EGD); Surgeon: Franklyn Gutierrez MD;  Location: [facility] GI LAB; Service: Gastroenterology    TONSILLECTOMY      VEIN LIGATION AND STRIPPING Bilateral     1980's     Social History   Social History     Substance and Sexual Activity   Alcohol Use Not Currently    Frequency: Never    Comment: stopped in 2009     Social History     Substance and Sexual Activity   Drug Use Not Currently     Social History     Tobacco Use   Smoking Status Former Smoker    Packs/day: 1 00    Years: 60 00    Pack years: 60 00    Quit date: 8/31/2017    Years since quitting: 3 5   Smokeless Tobacco Never Used   Tobacco Comment    quit in august 2017     E-Cigarette/Vaping    E-Cigarette Use Never User       E-Cigarette/Vaping Substances    Nicotine No     THC No     CBD No     Flavoring No     Other No     Unknown No      Family History:   Family History   Problem Relation Age of Onset    Emphysema Mother         never smoked    Emphysema Father     Cancer Brother         colon    Colon cancer Brother     Ulcerative colitis Family     Liver disease Family        Meds/Allergies   PTA meds:   Prior to Admission Medications   Prescriptions Last Dose Informant Patient Reported? Taking?    Diclofenac Sodium (VOLTAREN) 1 %   No No   Sig: Apply 2 g topically 4 (four) times a day Apply to R knee   LORazepam (ATIVAN) 1 mg tablet  Self No No   Sig: TAKE ONE TABLET BY MOUTH TWICE DAILY    OLANZapine (ZyPREXA) 5 mg tablet   No No   Sig: Take 1 5 tablets (7 5 mg total) by mouth daily at bedtime   OXYGEN-HELIUM IN  Self Yes No   Sig: Inhale 2L at rest- 3L with activity   Ventolin  (90 Base) MCG/ACT inhaler  Self No No   Sig: Inhale 1 puff every 6 (six) hours as needed for shortness of breath   ZEMAIRA infusion  Self Yes No   Sig: once a week    acetaminophen (TYLENOL) 500 mg tablet   No No   Sig: Take 2 tablets (1,000 mg total) by mouth every 8 (eight) hours   budesonide (PULMICORT) 0 5 mg/2 mL nebulizer solution  Self No No   Sig: Take 1 vial (0 5 mg total) by nebulization 2 (two) times a day Rinse mouth after use     busPIRone (BUSPAR) 10 mg tablet  Self No No   Sig: Take 1 tablet (10 mg total) by mouth 3 (three) times a day   cholestyramine sugar free (QUESTRAN LIGHT) 4 g packet   No No   Sig: Take 1 packet (4 g total) by mouth 2 (two) times a day   doxylamine (Unisom SleepTabs) 25 MG tablet  Self Yes No   Sig: Take 25 mg by mouth daily at bedtime as needed for sleep   famotidine (PEPCID) 20 mg tablet  Self No No   Sig: Take 1 tablet (20 mg total) by mouth daily   finasteride (PROSCAR) 5 mg tablet  Self No No   Sig: Take 1 tablet (5 mg total) by mouth every morning   fluticasone (FLONASE) 50 mcg/act nasal spray  Self No No   Si sprays into each nostril daily   formoterol (Perforomist) 20 MCG/2ML nebulizer solution  Self No No   Sig: Take 1 vial (20 mcg total) by nebulization 2 (two) times a day   gabapentin (NEURONTIN) 300 mg capsule  Self No No   Sig: Take 1 capsule (300 mg total) by mouth 3 (three) times a day   guaiFENesin (ROBITUSSIN) 100 MG/5ML oral liquid  Self Yes No   Sig: Take 200 mg by mouth 2 (two) times a day   ipratropium-albuterol (DUO-NEB) 0 5-2 5 mg/3 mL nebulizer solution   No No   Sig: take 1 vial (3ml) by nebulization four times a day as needed    midodrine (PROAMATINE) 2 5 mg tablet  Self No No   Sig: Take 1 tablet (2 5 mg total) by mouth 3 (three) times a day   pantoprazole (PROTONIX) 40 mg tablet  Self No No   Sig: Take 1 tablet (40 mg total) by mouth 2 (two) times a day   tamsulosin (FLOMAX) 0 4 mg  Self No No   Sig: Half a tablet daily   tamsulosin (FLOMAX) 0 4 mg  Self Yes No   Sig: Take 0 4 mg by mouth daily   traZODone (DESYREL) 100 mg tablet   No No   Sig: Take 1 tablet (100 mg total) by mouth daily at bedtime      Facility-Administered Medications: None     Allergies   Allergen Reactions    Chantix [Varenicline]      Caused prostate infection       Objective   Vitals: Blood pressure 159/76, pulse 82, temperature 99 4 °F (37 4 °C), temperature source Tympanic, resp  rate 20, weight 87 5 kg (193 lb), SpO2 98 %  No intake or output data in the 24 hours ending 03/02/21 2127    Invasive Devices     Peripheral Intravenous Line            Peripheral IV 03/02/21 Right Forearm less than 1 day                Physical Exam  Vitals signs reviewed  Constitutional:       General: He is awake  He is not in acute distress  Cardiovascular:      Rate and Rhythm: Normal rate and regular rhythm  Heart sounds: Normal heart sounds, S1 normal and S2 normal    Pulmonary:      Effort: Pulmonary effort is normal       Breath sounds: Normal breath sounds  No decreased breath sounds, wheezing, rhonchi or rales  Abdominal:      General: Abdomen is flat  Palpations: Abdomen is soft  Tenderness: There is no abdominal tenderness  There is no right CVA tenderness, left CVA tenderness, guarding or rebound  Musculoskeletal:      Right lower leg: No edema  Left lower leg: No edema  Lymphadenopathy:      Cervical: No cervical adenopathy  Right cervical: No superficial or posterior cervical adenopathy  Left cervical: No superficial or posterior cervical adenopathy  Neurological:      Mental Status: He is alert  Motor: Weakness present  Comments: 2/5 LE BL, sensation intact, can move toes flex and extend ankle  Psychiatric:         Behavior: Behavior is cooperative  Lab Results: I have personally reviewed pertinent reports       Results for orders placed or performed during the hospital encounter of 03/02/21   COVID19, Influenza A/B, RSV PCR, [facility]    Specimen: Nasopharyngeal Swab; Nares   Result Value Ref Range    SARS-CoV-2 Negative Negative    INFLUENZA A PCR Negative Negative    INFLUENZA B PCR Negative Negative    RSV PCR Negative Negative   UA w Reflex to Microscopic w Reflex to Culture    Specimen: Urine, Clean Catch   Result Value Ref Range Color, UA Light Yellow     Clarity, UA Clear     Specific Gravity, UA 1 020 1 000 - 1 030    pH, UA 8 0 5 0, 5 5, 6 0, 6 5, 7 0, 7 5, 8 0, 8 5, 9 0    Leukocytes, UA Negative Negative    Nitrite, UA Negative Negative    Protein, UA Negative Negative mg/dl    Glucose, UA Negative Negative mg/dl    Ketones, UA Negative Negative mg/dl    Urobilinogen, UA 0 2 0 2, 1 0 E U /dl E U /dl    Bilirubin, UA Negative Negative    Blood, UA Negative Negative   CBC and differential   Result Value Ref Range    WBC 7 03 4 31 - 10 16 Thousand/uL    RBC 4 35 3 88 - 5 62 Million/uL    Hemoglobin 13 6 12 0 - 17 0 g/dL    Hematocrit 41 7 36 5 - 49 3 %    MCV 96 82 - 98 fL    MCH 31 3 26 8 - 34 3 pg    MCHC 32 6 31 4 - 37 4 g/dL    RDW 13 1 11 6 - 15 1 %    MPV 9 1 8 9 - 12 7 fL    Platelets 656 (L) 720 - 390 Thousands/uL    nRBC 0 /100 WBCs    Neutrophils Relative 67 43 - 75 %    Immat GRANS % 0 0 - 2 %    Lymphocytes Relative 20 14 - 44 %    Monocytes Relative 9 4 - 12 %    Eosinophils Relative 3 0 - 6 %    Basophils Relative 1 0 - 1 %    Neutrophils Absolute 4 72 1 85 - 7 62 Thousands/µL    Immature Grans Absolute 0 03 0 00 - 0 20 Thousand/uL    Lymphocytes Absolute 1 40 0 60 - 4 47 Thousands/µL    Monocytes Absolute 0 62 0 17 - 1 22 Thousand/µL    Eosinophils Absolute 0 21 0 00 - 0 61 Thousand/µL    Basophils Absolute 0 05 0 00 - 0 10 Thousands/µL   Basic metabolic panel   Result Value Ref Range    Sodium 136 136 - 145 mmol/L    Potassium 4 3 3 5 - 5 3 mmol/L    Chloride 102 100 - 108 mmol/L    CO2 26 21 - 32 mmol/L    ANION GAP 8 4 - 13 mmol/L    BUN 12 5 - 25 mg/dL    Creatinine 1 09 0 60 - 1 30 mg/dL    Glucose 115 65 - 140 mg/dL    Calcium 8 7 8 3 - 10 1 mg/dL    eGFR 65 ml/min/1 73sq m   Magnesium   Result Value Ref Range    Magnesium 1 7 1 6 - 2 6 mg/dL   TSH   Result Value Ref Range    TSH 3RD GENERATON 0 909 0 358 - 3 740 uIU/mL   Troponin I   Result Value Ref Range    Troponin I <0 02 <=0 04 ng/mL   CK (with reflex to MB) Result Value Ref Range    Total CK 80 39 - 308 U/L       Imaging: I have personally reviewed pertinent reports  XR chest 1 view portable   ED Interpretation   No acute disease  EKG, Pathology, and Other Studies: I have personally reviewed pertinent reports  NSR    Code Status:  Level 1:  Full code    Counseling / Coordination of Care  Total floor / unit time spent today 55 minutes  Greater than 50% of total time was spent with the patient and / or family counseling and / or coordination of care  A description of the counseling / coordination of care:  Plan discussed with senior resident attending physician on-call, they agree with current plan  Geneva Gutierrez

## 2021-03-03 NOTE — NURSING NOTE
Patient discharged from two [address] [place]  IV removed prior to discharge  Patient left via stretcher and accompanied by a wheelchair  Patient left with all their belongings  Patient left accompanied by transport team and left via stretcher  Patient being [facility] to [facility]  [facility] for [address] [place]  All paperwork printed out and give to transport team to be given to receiving facility  No prescriptions written, but one prescription being faxed over by primary team  Report was called and given to receiving facility prior to discharge, all questions were answered

## 2021-03-03 NOTE — PLAN OF CARE
Problem: Potential for Falls  Goal: Patient will remain free of falls  Description: INTERVENTIONS:  - Assess patient frequently for physical needs  -  Identify cognitive and physical deficits and behaviors that affect risk of falls  -  [place] fall precautions as indicated by assessment   - Educate patient/family on patient safety including physical limitations  - Instruct patient to call for assistance with activity based on assessment  - Modify environment to reduce risk of injury  - Consider OT/PT consult to assist with strengthening/mobility  Outcome: Progressing     Problem: Nutrition/Hydration-ADULT  Goal: Nutrient/Hydration intake appropriate for improving, restoring or maintaining nutritional needs  Description: Monitor and assess patient's nutrition/hydration status for malnutrition  Collaborate with interdisciplinary team and initiate plan and interventions as ordered  Monitor patient's weight and dietary intake as ordered or per policy  Utilize nutrition screening tool and intervene as necessary  Determine patient's food preferences and provide high-protein, high-caloric foods as appropriate       INTERVENTIONS:  - Monitor oral intake, urinary output, labs, and treatment plans  - Assess nutrition and hydration status and recommend course of action  - Evaluate amount of meals eaten  - Assist patient with eating if necessary   - Allow adequate time for meals  - Recommend/ encourage appropriate diets, oral nutritional supplements, and vitamin/mineral supplements  - Order, calculate, and assess calorie counts as needed  - Assess need for intravenous fluids  - Provide specific nutrition/hydration education as appropriate  - Include patient/family/caregiver in decisions related to nutrition  Outcome: Progressing     Problem: RESPIRATORY - ADULT  Goal: Achieves optimal ventilation and oxygenation  Description: INTERVENTIONS:  - Assess for changes in respiratory status  - Assess for changes in mentation and behavior  - Position to facilitate oxygenation and minimize respiratory effort  - Oxygen administered by appropriate delivery if ordered  - Initiate smoking cessation education as indicated  - Encourage broncho-pulmonary hygiene including cough, deep breathe, Incentive Spirometry  - Assess the need for suctioning and aspirate as needed  - Assess and instruct to report SOB or any respiratory difficulty  - Respiratory Therapy support as indicated  Outcome: Progressing     Problem: Prexisting or High Potential for Compromised Skin Integrity  Goal: Skin integrity is maintained or improved  Description: INTERVENTIONS:  - Identify patients at risk for skin breakdown  - Assess and monitor skin integrity  - Assess and monitor nutrition and hydration status  - Monitor labs   - Assess for incontinence   - Turn and reposition patient  - Assist with mobility/ambulation  - Relieve pressure over bony prominences  - Avoid friction and shearing  - Provide appropriate hygiene as needed including keeping skin clean and dry  - Evaluate need for skin moisturizer/barrier cream  - Collaborate with interdisciplinary team   - Patient/family teaching  - Consider wound care consult   Outcome: Progressing

## 2021-03-03 NOTE — PLAN OF CARE
Problem: Potential for Falls  Goal: Patient will remain free of falls  Description: INTERVENTIONS:  - Assess patient frequently for physical needs  -  Identify cognitive and physical deficits and behaviors that affect risk of falls  -  [place] fall precautions as indicated by assessment   - Educate patient/family on patient safety including physical limitations  - Instruct patient to call for assistance with activity based on assessment  - Modify environment to reduce risk of injury  - Consider OT/PT consult to assist with strengthening/mobility  3/3/2021 1615 by Sophia Edmonds RN  Outcome: Adequate for Discharge  3/3/2021 0816 by Sophia Edmonds RN  Outcome: Progressing     Problem: Nutrition/Hydration-ADULT  Goal: Nutrient/Hydration intake appropriate for improving, restoring or maintaining nutritional needs  Description: Monitor and assess patient's nutrition/hydration status for malnutrition  Collaborate with interdisciplinary team and initiate plan and interventions as ordered  Monitor patient's weight and dietary intake as ordered or per policy  Utilize nutrition screening tool and intervene as necessary  Determine patient's food preferences and provide high-protein, high-caloric foods as appropriate       INTERVENTIONS:  - Monitor oral intake, urinary output, labs, and treatment plans  - Assess nutrition and hydration status and recommend course of action  - Evaluate amount of meals eaten  - Assist patient with eating if necessary   - Allow adequate time for meals  - Recommend/ encourage appropriate diets, oral nutritional supplements, and vitamin/mineral supplements  - Order, calculate, and assess calorie counts as needed  - Assess need for intravenous fluids  - Provide specific nutrition/hydration education as appropriate  - Include patient/family/caregiver in decisions related to nutrition  3/3/2021 1615 by Sophia Edmonds RN  Outcome: Adequate for Discharge  3/3/2021 0816 by Sophia Edmonds RN  Outcome: Progressing     Problem: RESPIRATORY - ADULT  Goal: Achieves optimal ventilation and oxygenation  Description: INTERVENTIONS:  - Assess for changes in respiratory status  - Assess for changes in mentation and behavior  - Position to facilitate oxygenation and minimize respiratory effort  - Oxygen administered by appropriate delivery if ordered  - Initiate smoking cessation education as indicated  - Encourage broncho-pulmonary hygiene including cough, deep breathe, Incentive Spirometry  - Assess the need for suctioning and aspirate as needed  - Assess and instruct to report SOB or any respiratory difficulty  - Respiratory Therapy support as indicated  3/3/2021 1615 by Sil Shanks RN  Outcome: Adequate for Discharge  3/3/2021 0816 by Sil Shanks RN  Outcome: Progressing     Problem: Prexisting or High Potential for Compromised Skin Integrity  Goal: Skin integrity is maintained or improved  Description: INTERVENTIONS:  - Identify patients at risk for skin breakdown  - Assess and monitor skin integrity  - Assess and monitor nutrition and hydration status  - Monitor labs   - Assess for incontinence   - Turn and reposition patient  - Assist with mobility/ambulation  - Relieve pressure over bony prominences  - Avoid friction and shearing  - Provide appropriate hygiene as needed including keeping skin clean and dry  - Evaluate need for skin moisturizer/barrier cream  - Collaborate with interdisciplinary team   - Patient/family teaching  - Consider wound care consult   3/3/2021 1615 by Sil Shanks RN  Outcome: Adequate for Discharge  3/3/2021 0816 by Sil Shanks RN  Outcome: Progressing

## 2021-03-03 NOTE — DISCHARGE SUMMARY
[facility] Discharge Summary - Medical Devi Flynn 67 y o  male MRN: [id number]    [place] [address] [place] / Bed: [address] Encounter: [id number]    BRIEF OVERVIEW  Admitting Provider: Bri Keith DO  Discharge Provider: Bri Keith DO    Discharge To:  [facility]  Facility: [facility]    Outpatient Follow-Up: [facility]  Things to address at first follow up visit:    1) Ambulatory dysfunction    2) Goals of Care    Labs and results pending at discharge: NA    Admission Date: 3/2/2021     Discharge Date: No discharge date for patient encounter  Primary Discharge Diagnosis  Principal Problem:    Weakness generalized  Active Problems:    AAT (alpha-1-antitrypsin) deficiency ([facility])    Gastroesophageal reflux disease    Essential hypertension    COPD (chronic obstructive pulmonary disease) ([facility])  Resolved Problems:    * No resolved hospital problems  *      Secondary Discharge Diagnoses  NA  Consulting Providers   NA        [address] STAY    Procedures Performed/Pertinent Test results  Xr Chest 1 View Portable    Result Date: 3/3/2021  Impression: Emphysematous changes are noted  No focal consolidation, pleural effusion, or pneumothorax  Workstation performed: [id number]        HPI  From Admission    67 y o  male who presents with PMH of alpha-1 antitrypsin deficiency, severe COPD end-stage on home oxygen 3 L, BPH, pulmonary HTN presents complaining of generalized weakness that has been ongoing for quite some time now  Reports that today at 10 am he was sitting down and tried to get up but could not move his legs  Reports thigh pain when attempting to move his legs  Did not fall, no LOC  Hospital Course    Patient was admitted to the general medical floor  Patient remained alert and oriented to person, place, and time throughout stay  Patient lab work remained within normal limits compared to his baseline   Patient was seen and examined by physical therapy who recommended short term rehabilitation  Patient was agreeable to this  On day of discharge patient was tolerating oral diet and oxygenating well on his home 02 regimen  Patient was advised to take all medication as prescribed and will be followed up at short term rehab  Physical Exam at Discharge  See progress note from day of discharge    Medications   Current Discharge Medication List          Current Discharge Medication List      CONTINUE these medications which have NOT CHANGED    Details   budesonide (PULMICORT) 0 5 mg/2 mL nebulizer solution Take 1 vial (0 5 mg total) by nebulization 2 (two) times a day Rinse mouth after use  Qty: 60 vial, Refills: 11    Associated Diagnoses: Centrilobular emphysema (HCC)      busPIRone (BUSPAR) 10 mg tablet Take 1 tablet (10 mg total) by mouth 3 (three) times a day  Qty: 90 tablet, Refills: 2    Associated Diagnoses: Chronic respiratory failure with hypoxia (HCC)      cholestyramine sugar free (QUESTRAN LIGHT) 4 g packet Take 1 packet (4 g total) by mouth 2 (two) times a day  Qty: 60 packet, Refills: 1    Associated Diagnoses: AAT (alpha-1-antitrypsin) deficiency ([facility] [address] );  Diarrhea, unspecified type      doxylamine (Unisom SleepTabs) 25 MG tablet Take 25 mg by mouth daily at bedtime as needed for sleep      fluticasone (FLONASE) 50 mcg/act nasal spray 2 sprays into each nostril daily  Qty: 16 g, Refills: 5    Associated Diagnoses: Rhinitis, unspecified type      formoterol (Perforomist) 20 MCG/2ML nebulizer solution Take 1 vial (20 mcg total) by nebulization 2 (two) times a day  Qty: 60 vial, Refills: 11    Associated Diagnoses: Centrilobular emphysema (HCC)      gabapentin (NEURONTIN) 300 mg capsule Take 1 capsule (300 mg total) by mouth 3 (three) times a day  Qty: 90 capsule, Refills: 6    Associated Diagnoses: Neuropathy      guaiFENesin (ROBITUSSIN) 100 MG/5ML oral liquid Take 200 mg by mouth 2 (two) times a day      ipratropium-albuterol (DUO-NEB) 0 5-2 5 mg/3 mL nebulizer solution take 1 vial (3ml) by nebulization four times a day as needed   Qty: 360 mL, Refills: 0    Associated Diagnoses: COPD exacerbation (HCC)      LORazepam (ATIVAN) 1 mg tablet TAKE ONE TABLET BY MOUTH TWICE DAILY   Qty: 60 tablet, Refills: 3    Associated Diagnoses: Anxiety      midodrine (PROAMATINE) 2 5 mg tablet Take 1 tablet (2 5 mg total) by mouth 3 (three) times a day  Qty: 90 tablet, Refills: 11    Associated Diagnoses: Orthostatic hypertension      OLANZapine (ZyPREXA) 5 mg tablet Take 1 5 tablets (7 5 mg total) by mouth daily at bedtime  Qty: 45 tablet, Refills: 2    Associated Diagnoses: Insomnia, unspecified type; Palliative care patient; Dysphoric mood      OXYGEN-HELIUM IN Inhale 2L at rest- 3L with activity      pantoprazole (PROTONIX) 40 mg tablet Take 1 tablet (40 mg total) by mouth 2 (two) times a day  Qty: 180 tablet, Refills: 3    Associated Diagnoses: Gastroesophageal reflux disease without esophagitis      traZODone (DESYREL) 100 mg tablet Take 1 tablet (100 mg total) by mouth daily at bedtime  Qty: 30 tablet, Refills: 2    Associated Diagnoses: Palliative care patient; Insomnia, unspecified type      ZEMAIRA infusion once a week       acetaminophen (TYLENOL) 500 mg tablet Take 2 tablets (1,000 mg total) by mouth every 8 (eight) hours  Qty: 90 tablet, Refills: 3    Associated Diagnoses: Palliative care patient; Arthritis      Diclofenac Sodium (VOLTAREN) 1 % Apply 2 g topically 4 (four) times a day Apply to R knee  Qty: 50 g, Refills: 0    Associated Diagnoses: Palliative care patient;  Arthritis      famotidine (PEPCID) 20 mg tablet Take 1 tablet (20 mg total) by mouth daily  Qty: 30 tablet, Refills: 3    Associated Diagnoses: Gastroesophageal reflux disease with esophagitis      finasteride (PROSCAR) 5 mg tablet Take 1 tablet (5 mg total) by mouth every morning  Qty: 90 tablet, Refills: 3    Associated Diagnoses: Benign prostatic hyperplasia with urinary obstruction      !! tamsulosin (FLOMAX) 0 4 mg Half a tablet daily  Qty: 30 capsule, Refills: 5    Associated Diagnoses: BPH (benign prostatic hyperplasia)      ! ! tamsulosin (FLOMAX) 0 4 mg Take 0 4 mg by mouth daily      Ventolin  (90 Base) MCG/ACT inhaler Inhale 1 puff every 6 (six) hours as needed for shortness of breath  Qty: 1 Inhaler, Refills: 11    Comments: Substitution to a formulary equivalent within the same pharmaceutical class is authorized  Associated Diagnoses: Centrilobular emphysema ([facility] [address] )       ! ! - Potential duplicate medications found  Please discuss with provider  Current Discharge Medication List         Current Discharge Medication List             Allergies  Allergies   Allergen Reactions    Chantix [Varenicline]      Caused prostate infection       Diet restrictions: as tolerated  Activity restrictions: as tolerated  Code Status: Level 1 - Full Code  Advance Directive and Living Will: <no information>  Power of :    POLST:      Discharge Condition: stable      Discharge  Statement   I spent 30 minutes discharging the patient  This time was spent on the day of discharge  I had direct contact with the patient on the day of discharge  Additional documentation is required if more than 30 minutes were spent on discharge

## 2021-03-03 NOTE — OCCUPATIONAL THERAPY NOTE
Occupational Therapy Evaluation       03/03/21 0932   Note Type   Note type Evaluation   Restrictions/Precautions   Other Precautions Chair Alarm; Bed Alarm; Fall Risk   Pain Assessment   Pain Assessment Tool 0-10   Pain Score 4   Pain Location/Orientation Orientation: Bilateral  (Thighs)   Home Living   Type of [address] One level  (1 TEMI from garage)   Bathroom Shower/Tub Walk-in shower   Bathroom Toilet Standard   Bathroom Equipment Other (Comment)  (None)   Home Equipment Walker;Cane   Additional Comments Patient admitted to hospital with c/o generalized weakness and increased fatigue; patient reports 1 day PTA stayed on his couch all day as he was unable to get up on his own; patient had recently started outpatient PT services, patient reports he was not using RW in the home even though it was recommended by PT as he prefers his cane, was not using cane all the time in the home either   Patient reports is independent in ADLs but was having more difficulty recently; is able to prepare own meals and do own laundry, has cleaning lady, and daughter assists with grocery shopping   Prior Function   Level of [place] Independent with ADLs and functional mobility   Lives With Alone   Receives Help From Family   ADL Assistance Independent   IADLs Needs assistance   ADL   Eating Assistance 7  Independent   Grooming Assistance 5  Supervision/Setup   Grooming Deficit Setup   UB Bathing Assistance 4  Minimal Assistance   LB Bathing Assistance 3  Moderate Assistance   UB Dressing Assistance 4  Minimal Assistance   LB Dressing Assistance 3  Moderate Assistance   Toileting Assistance  4  Minimal Assistance   Bed Mobility   Additional Comments Patient received seated at [facility] in care of PCA at start of session   Transfers   Sit to Stand 3  Moderate assistance   Additional items Assist x 1  (Height of bed raised)   Stand to Sit 4  Minimal assistance   Additional items Assist x 1   Toilet transfer 3  Moderate assistance   Additional items Assist x 1;Commode  (Sit to stand from commode)   Functional Mobility   Functional Mobility 3  Moderate assistance   Additional Comments Few steps with RW and mod assist   Balance   Static Sitting Fair +   Dynamic Sitting Fair   Static Standing Fair   Dynamic Standing Fair -   Activity Tolerance   Activity Tolerance Patient limited by fatigue;Treatment limited secondary to medical complications (Comment)   Medical Staff Made Aware Yes, spoke with Alexis Bishop team at end of session   RUE Assessment   RUE Assessment WFL   LUE Assessment   LUE Assessment WFL   Cognition   Overall Cognitive Status WFL   Arousal/Participation Alert; Cooperative   Attention Within functional limits   Orientation Level Oriented X4   Following Commands Follows all commands and directions without difficulty   Assessment   Limitation Decreased ADL status; Decreased UE strength;Decreased Safe judgement during ADL;Decreased endurance;Decreased self-care trans;Decreased high-level ADLs   Prognosis Good   Assessment Patient evaluated by Occupational Therapy  Patient admitted with Weakness generalized  The patients occupational profile, medical and therapy history includes a extensive additional review of physical, cognitive, or psychosocial history related to current functional performance  Comorbidities affecting functional mobility and ADLS include: pulmonary emphysema, arthritis, BPH, IBS, liver disease, hiatal hernia, COPD, cancer, HTN  Prior to admission, patient was independent with functional mobility without assistive device, independent with ADLS and requiring assist for IADLS    The evaluation identifies the following performance deficits: weakness, impaired balance, decreased endurance, increased fall risk, new onset of impairment of functional mobility, decreased ADLS, decreased IADLS, decreased activity tolerance, decreased safety awareness, impaired judgement and decreased strength, that result in activity limitations and/or participation restrictions  This evaluation requires clinical decision making of high complexity, because the patient presents with comorbidites that affect occupational performance and required significant modification of tasks or assistance with consideration of multiple treatment options  The Barthel Index was used as a functional outcome tool presenting with a score of 45, indicating marked limitations of functional mobility and ADLS  The patient's raw score on the AM-PAC Daily Activity inpatient short form is 17, standardized score is 37 26, less than 39 4  Patients at this level are likely to benefit from DC to post-acute rehabilitation services  Please refer to the recommendation of the Occupational Therapist for safe DC planning  Patient will benefit from skilled Occupational Therapy services to address above deficits and facilitate a safe return to prior level of function  Goals   Patient Goals "to be able to go home"   STG Time Frame   (1-7 days)   Short Term Goal  Goals established to promote patient goal of being able to go home:  Patient will increase standing tolerance to 5 minutes during ADL task to decrease assistance level and decrease fall risk; Patient will increase bed mobility to supervision in preparation for ADLS and transfers;  Patient will increase functional mobility to and from bathroom with rolling walker with min assist to increase performance with ADLS and to use a toilet; Patient will tolerate 5 minutes of UE ROM/strengthening to increase general activity tolerance and performance in ADLS/IADLS; Patient will improve functional activity tolerance to 5 minutes of sustained functional tasks to increase participation in basic self-care and decrease assistance level;  Patient will be able to to verbalize understanding and perform energy conservation/proper body mechanics during ADLS and functional mobility at least 50% of the time with moderate cueing to decrease signs of fatigue and increase stamina to return to prior level of function; Patient will increase dynamic sitting balance to fair+ to improve the ability to sit at edge of bed or on a chair for ADLS;  Patient will increase dynamic standing balance to fair to improve postural stability and decrease fall risk during standing ADLS and transfers  LTG Time Frame   (8-14 days)   Long Term Goal Patient will increase standing tolerance to 10 minutes during ADL task to decrease assistance level and decrease fall risk; Patient will increase bed mobility to independent in preparation for ADLS and transfers; Patient will increase functional mobility to and from bathroom with rolling walker with supervision to increase performance with ADLS and to use a toilet; Patient will tolerate 10 minutes of UE ROM/strengthening to increase general activity tolerance and performance in ADLS/IADLS; Patient will improve functional activity tolerance to 10 minutes of sustained functional tasks to increase participation in basic self-care and decrease assistance level;  Patient will be able to to verbalize understanding and perform energy conservation/proper body mechanics during ADLS and functional mobility at least 75% of the time with minimalcueing to decrease signs of fatigue and increase stamina to return to prior level of function; Patient will increase static/dynamic sitting balance to good to improve the ability to sit at edge of bed or on a chair for ADLS;  Patient will increase static/dynamic standing balance to fair+ to improve postural stability and decrease fall risk during standing ADLS and transfers     Functional Transfer Goals   Pt Will Perform All Functional Transfers   (STG min assist, LTG supervision)   ADL Goals   Pt Will Perform Bathing   (STG min assist, LTG supervision)   Pt Will Perform UE Dressing   (STG supervision, LTG independent)   Pt Will Perform LE Dressing   (STG min assist, LTG supervision)   Pt Will Perform Toileting   (STG supervision, LTG independent)   Plan   Treatment Interventions ADL retraining;Functional transfer training;UE strengthening/ROM; Endurance training;Patient/family training;Equipment evaluation/education; Compensatory technique education;Continued evaluation; Energy conservation; Activityengagement   Goal Expiration Date 03/17/21   Additional Treatment Session   Start Time 0915   End Time 0932   Treatment Assessment While seated EOB patient performed lower body dressing task: don bilateral sneakers with mod assist  Increased difficulty reaching down to feet to get heels into shoes, needed assist for tying laces  Patient performed sit to stand from EOB  with height of bed raised with mod assist  Ambulated few steps to bedside chair with RW and mod assist, stand to sit to chair with min assist  Patient generally unsteady and with poor RW negotiation, requires max cues for safe RW management  After short rest break patient performed sit to stand from bedside chair with mod assist, ambulated few feet with RW and min assist  Max verbal cues for RW management when turning to return to sit in chair, patient attempting to unsafely abandon RW prior to return to chair  At end of session patient seated in bedside chair with all needs met, chair alarm set      Recommendation   OT Discharge Recommendation Post-Acute Rehabilitation Services   AM-PAC Daily Activity Inpatient   Lower Body Dressing 2   Bathing 2   Toileting 3   Upper Body Dressing 3   Grooming 3   Eating 4   Daily Activity Raw Score 17   Daily Activity Standardized Score (Calc for Raw Score >=11) 37 26   AM-PAC Applied Cognition Inpatient   Following a Speech/Presentation 4   Understanding Ordinary Conversation 4   Taking Medications 4   Remembering Where Things Are Placed or Put Away 4   Remembering List of 4-5 Errands 4   Taking Care of Complicated Tasks 3   Applied Cognition Raw Score 23   Applied Cognition Standardized Score 53 08   Barthel Index   Feeding 10   Bathing 0   Grooming Score 0   Dressing Score 5   Bladder Score 10   Bowels Score 10   Toilet Use Score 5   Transfers (Bed/Chair) Score 5   Mobility (Level Surface) Score 0   Stairs Score 0   Barthel Index Score 39   Licensure   [place] License Number  Meghan Harpreet, OTR/L [id number]

## 2021-03-03 NOTE — PHYSICAL THERAPY NOTE
PT EVALUATION       03/03/21 0940   PT Last Visit   PT Visit Date 03/03/21   Note Type   Note type Evaluation   Pain Assessment   Pain Assessment Tool 0-10   Pain Score 3   Pain Location/Orientation Orientation: Bilateral;Location: Leg   Home Living   Type of [address] One level  (2 TEMI with rail)   Home Equipment Cane  (RW;home O2 24/7 3L)   Prior Function   Level of [place] Independent with ADLs and functional mobility   Lives With Alone   Receives Help From Family   ADL Assistance Independent   IADLs Needs assistance   Comments Pt amb without AD or with cane inside, uses cane outside  Restrictions/Precautions   Other Precautions O2;Fall Risk;Bed Alarm; Chair Alarm   General   Additional Pertinent History Pt adm with generalized weakness  Pt reports he was unable to stand up from his couch  Cognition   Overall Cognitive Status WFL   Arousal/Participation Cooperative   Orientation Level Oriented X4   Following Commands Follows all commands and directions without difficulty   RLE Assessment   RLE Assessment WFL  (3- to 3/5)   LLE Assessment   LLE Assessment WFL  (3- to 3/5)   Transfers   Sit to Stand 3  Moderate assistance   Additional items Assist x 1;Verbal cues   Stand to Sit 4  Minimal assistance   Additional items Assist x 1;Verbal cues   Ambulation/Elevation   Gait pattern   (unsteady;mild knee buckling due to weakness)   Gait Assistance   (mod/min A)   Additional items Assist x 1;Verbal cues; Tactile cues   Assistive Device Rolling walker   Distance 5 feet with chair follow  Pt remained OOB in chair with (+) chair alarm   Balance   Static Sitting Fair +   Static Standing Fair   Dynamic Standing Fair -   Ambulatory Poor +   Activity Tolerance   Activity Tolerance Patient limited by fatigue;Treatment limited secondary to medical complications (Comment)  (weakness and deconditioning)   Assessment   Problem List Decreased strength;Decreased range of motion;Decreased endurance; Impaired balance;Decreased mobility; Decreased coordination;Decreased safety awareness;Pain   Assessment Patient seen for Physical Therapy evaluation  Patient admitted with Weakness generalized  Comorbidities affecting patient's physical performance include: AAT deficiency, HTN, COPD, causalgia lower limb  Personal factors affecting patient at time of initial evaluation include: lives in one story house, ambulating with assistive device, stairs to enter home, inability to navigate community distances, inability to navigate level surfaces without external assistance, inability to perform dynamic tasks in community and limited home support  Prior to admission, patient was independent with functional mobility with a cane, independent with functional mobility without assistive device, independent with ADLS, requiring assist for IADLS, living alone in one story home with 2 steps to enter and ambulating household distance  Please find objective findings from Physical Therapy assessment regarding body systems outlined above with impairments and limitations including weakness, decreased ROM, impaired balance, decreased endurance, impaired coordination, gait deviations, decreased activity tolerance, decreased functional mobility tolerance, decreased safety awareness and fall risk  The Barthel Index was used as a functional outcome tool presenting with a score of 45 today indicating marked limitations of functional mobility and ADLS  Patient's clinical presentation is currently unstable/unpredictable as seen in patient's presentation of vital sign response, changing level of pain, increased fall risk, new onset of impairment of functional mobility, decreased endurance and new onset of weakness  Pt would benefit from continued Physical Therapy treatment to address deficits as defined above and maximize level of functional mobility   As demonstrated by objective findings, the assigned level of complexity for this evaluation is high     The patient's AM-[facility] Basic Mobility Inpatient Short Form Raw Score is 11, Standardized Score is 30 25  A standardized score less than 42 9 suggests the patient may benefit from discharge to post-acute rehabilitation services  Please also refer to the recommendation of the Physical Therapist for safe discharge planning  Goals   Patient Goals "get stronger"   STG Expiration Date 03/10/21   Short Term Goal #1 bed mob - min A/S; trans - min A   Short Term Goal #2 pt will amb with RW and min A/S functional household distances; balance with RW - F/F+ for safe gait and mobility and to decrease fall risk; up/down 2 steps with rail - min A so pt can enter/exit his home   LTG Expiration Date 03/17/21   Long Term Goal #1 bed mob - I; trans - I   Long Term Goal #2 pt will amb with RW functional household distances - I; balance with RW - F+/G for safe gait and mobility and to decrease fall risk; strength LEs - 3+ to 4-/5; up/down 2 steps with rail - S/I so pt can enter/exit his home   Plan   Treatment/Interventions ADL retraining;Functional transfer training;LE strengthening/ROM; Elevations; Therapeutic exercise; Endurance training;Patient/family training;Equipment eval/education; Bed mobility;Gait training; Compensatory technique education   PT Frequency 5x/wk   Recommendation   PT Discharge Recommendation Post-Acute Rehabilitation Services   [facility] Basic Mobility Inpatient   Turning in Bed Without Bedrails 2   Lying on Back to Sitting on Edge of Flat Bed 2   Moving Bed to Chair 2   Standing Up From Chair 2   Walk in Room 2   Climb 3-5 Stairs 1   Basic Mobility Inpatient Raw Score 11   Basic Mobility Standardized Score 30 25   Barthel Index   Feeding 10   Bathing 0   Grooming Score 0   Dressing Score 5   Bladder Score 10   Bowels Score 10   Toilet Use Score 5   Transfers (Bed/Chair) Score 5   Mobility (Level Surface) Score 0   Stairs Score 0   Barthel Index Score 45   Licensure   [place] License Number  Varinder, DEDRA [id number]       Time In:0940  Time Out:0950  Total Time: 10 mins      S:  "I keep losing my balance"  O:  Pt trans sit to stand with mod A and amb with RW and min A x 10 feet with change in direction  Pt trans stand to sit with min A and remained OOB in chair with all needs in reach, (+) chair alarm  A:  Pt is weak and deconditioned and will benefit from cont skilled PT services to increase pt's strength and mobility and return him to his prior independent functional level  Pt is not safe for dc home and is at risk for falls - pt now requiring mod A for trans and min A for amb with RW   P:  Cont per PT POC  DCP - post-acute rehab services      Yokasta Bright   [id number]

## 2021-03-03 NOTE — PLAN OF CARE
Problem: Potential for Falls  Goal: Patient will remain free of falls  Description: INTERVENTIONS:  - Assess patient frequently for physical needs  -  Identify cognitive and physical deficits and behaviors that affect risk of falls  -  [place] fall precautions as indicated by assessment   - Educate patient/family on patient safety including physical limitations  - Instruct patient to call for assistance with activity based on assessment  - Modify environment to reduce risk of injury  - Consider OT/PT consult to assist with strengthening/mobility  Note: Patient with worsening generalized  weakness  Unable to move legs due to pain on bilateral thighs

## 2021-03-03 NOTE — PROGRESS NOTES
[facility] Practice Progress Note - Paul Members 67 y o  male MRN: [id number]    Unit/Bed#: [address] [place] [address] Encounter: [id number]      Assessment/Plan:  Weakness generalized  Assessment & Plan  · Patient presents with worsening ongoing generalized weakness  Was recently admitted for this same complaint  Was following with PT today and complained of feeling weak and tired  Per PT note from today he had 2nd COVID vaccine shot yesterday; possibly feeling tired/weak and thigh pain secondary to side effects of COVID vaccine  · ER: TSH 0 9, Na 136, K 4 3, CR 1 09, mg 1 7, CK WNL, trop neg, WBC 7, HB 13 6, COVID/flu/RSV neg, IV  X 1  · PT/OT consult appreciate recommendations  · Case management  · Fall precautions in place     COPD (chronic obstructive pulmonary disease) ([facility] [address] )  Assessment & Plan  · Patient has end-stage COPD, follows palliative care  · Continue home inhalers     Essential hypertension  Assessment & Plan  · BP stable on admission  · Continue home medications  · Continue monitor BP per floor protocol     Gastroesophageal reflux disease  Assessment & Plan  · Stable continue home medication     AAT (alpha-1-antitrypsin) deficiency (HCC)  Assessment & Plan  · Stable at this time falls pulmonology  · Continue weekly Zemaira injections budesonide and Perforomist nebs     GLOBAL:  Replete electrolytes as needed  SCDs and Lovenox  Cardiac diet  Hep Lock       Subjective:   Patient seen and examined at bedside  He is currently saturating well on room air and in  No acute distress  Still complains of bilateral lower extremity weakness  Objective:     Vitals: Blood pressure 168/89, pulse 65, temperature 98 °F (36 7 °C), resp  rate 18, height 6' 5" (1 956 m), weight 87 4 kg (192 lb 10 9 oz), SpO2 98 %  ,Body mass index is 22 85 kg/m²    Wt Readings from Last 3 Encounters:   03/02/21 87 4 kg (192 lb 10 9 oz)   02/23/21 88 5 kg (195 lb)   02/09/21 87 7 kg (193 lb 6 4 oz)     No intake or output data in the 24 hours ending 03/03/21 0714    Physical Exam: General appearance: alert and oriented, in no acute distress  Head: Normocephalic, without obvious abnormality, atraumatic  Lungs: clear to auscultation bilaterally  Chest wall: no tenderness  Heart: regular rate and rhythm, S1, S2 normal, no murmur, click, rub or gallop  Abdomen: soft, non-tender; bowel sounds normal; no masses,  no organomegaly  Extremities: extremities normal, warm and well-perfused; no cyanosis, clubbing, or edema  Pulses: 2+ and symmetric  Skin: Skin color, texture, turgor normal  No rashes or lesions  Neurologic: Sensation intact   Motor strength 2/5 bilateral       Recent Results (from the past 24 hour(s))   CBC and differential    Collection Time: 03/02/21  5:41 PM   Result Value Ref Range    WBC 7 03 4 31 - 10 16 Thousand/uL    RBC 4 35 3 88 - 5 62 Million/uL    Hemoglobin 13 6 12 0 - 17 0 g/dL    Hematocrit 41 7 36 5 - 49 3 %    MCV 96 82 - 98 fL    MCH 31 3 26 8 - 34 3 pg    MCHC 32 6 31 4 - 37 4 g/dL    RDW 13 1 11 6 - 15 1 %    MPV 9 1 8 9 - 12 7 fL    Platelets 285 (L) 933 - 390 Thousands/uL    nRBC 0 /100 WBCs    Neutrophils Relative 67 43 - 75 %    Immat GRANS % 0 0 - 2 %    Lymphocytes Relative 20 14 - 44 %    Monocytes Relative 9 4 - 12 %    Eosinophils Relative 3 0 - 6 %    Basophils Relative 1 0 - 1 %    Neutrophils Absolute 4 72 1 85 - 7 62 Thousands/µL    Immature Grans Absolute 0 03 0 00 - 0 20 Thousand/uL    Lymphocytes Absolute 1 40 0 60 - 4 47 Thousands/µL    Monocytes Absolute 0 62 0 17 - 1 22 Thousand/µL    Eosinophils Absolute 0 21 0 00 - 0 61 Thousand/µL    Basophils Absolute 0 05 0 00 - 0 10 Thousands/µL   Basic metabolic panel    Collection Time: 03/02/21  5:41 PM   Result Value Ref Range    Sodium 136 136 - 145 mmol/L    Potassium 4 3 3 5 - 5 3 mmol/L    Chloride 102 100 - 108 mmol/L    CO2 26 21 - 32 mmol/L    ANION GAP 8 4 - 13 mmol/L    BUN 12 5 - 25 mg/dL    Creatinine 1 09 0 60 - 1 30 mg/dL    Glucose 115 65 - 140 mg/dL    Calcium 8 7 8 3 - 10 1 mg/dL    eGFR 65 ml/min/1 73sq m   Magnesium    Collection Time: 03/02/21  5:41 PM   Result Value Ref Range    Magnesium 1 7 1 6 - 2 6 mg/dL   TSH    Collection Time: 03/02/21  5:41 PM   Result Value Ref Range    TSH 3RD GENERATON 0 909 0 358 - 3 740 uIU/mL   Troponin I    Collection Time: 03/02/21  5:41 PM   Result Value Ref Range    Troponin I <0 02 <=0 04 ng/mL   CK (with reflex to MB)    Collection Time: 03/02/21  5:41 PM   Result Value Ref Range    Total CK 80 39 - 308 U/L   COVID19, Influenza A/B, RSV PCR, [facility]    Collection Time: 03/02/21  5:43 PM    Specimen: Nasopharyngeal Swab; Nares   Result Value Ref Range    SARS-CoV-2 Negative Negative    INFLUENZA A PCR Negative Negative    INFLUENZA B PCR Negative Negative    RSV PCR Negative Negative   UA w Reflex to Microscopic w Reflex to Culture    Collection Time: 03/02/21  7:32 PM    Specimen: Urine, Clean Catch   Result Value Ref Range    Color, UA Light Yellow     Clarity, UA Clear     Specific Gravity, UA 1 020 1 000 - 1 030    pH, UA 8 0 5 0, 5 5, 6 0, 6 5, 7 0, 7 5, 8 0, 8 5, 9 0    Leukocytes, UA Negative Negative    Nitrite, UA Negative Negative    Protein, UA Negative Negative mg/dl    Glucose, UA Negative Negative mg/dl    Ketones, UA Negative Negative mg/dl    Urobilinogen, UA 0 2 0 2, 1 0 E U /dl E U /dl    Bilirubin, UA Negative Negative    Blood, UA Negative Negative   Basic metabolic panel    Collection Time: 03/03/21  5:03 AM   Result Value Ref Range    Sodium 138 136 - 145 mmol/L    Potassium 3 8 3 5 - 5 3 mmol/L    Chloride 104 100 - 108 mmol/L    CO2 27 21 - 32 mmol/L    ANION GAP 7 4 - 13 mmol/L    BUN 11 5 - 25 mg/dL    Creatinine 1 04 0 60 - 1 30 mg/dL    Glucose 101 65 - 140 mg/dL    Calcium 8 6 8 3 - 10 1 mg/dL    eGFR 69 ml/min/1 73sq m   Phosphorus    Collection Time: 03/03/21  5:03 AM   Result Value Ref Range    Phosphorus 3 1 2 3 - 4 1 mg/dL   Magnesium    Collection Time: 03/03/21  5:03 AM   Result Value Ref Range    Magnesium 1 8 1 6 - 2 6 mg/dL   CBC and differential    Collection Time: 03/03/21  5:03 AM   Result Value Ref Range    WBC 6 10 4 31 - 10 16 Thousand/uL    RBC 4 25 3 88 - 5 62 Million/uL    Hemoglobin 13 2 12 0 - 17 0 g/dL    Hematocrit 40 0 36 5 - 49 3 %    MCV 94 82 - 98 fL    MCH 31 1 26 8 - 34 3 pg    MCHC 33 0 31 4 - 37 4 g/dL    RDW 13 1 11 6 - 15 1 %    MPV 8 9 8 9 - 12 7 fL    Platelets 466 (L) 422 - 390 Thousands/uL    nRBC 0 /100 WBCs    Neutrophils Relative 51 43 - 75 %    Immat GRANS % 0 0 - 2 %    Lymphocytes Relative 33 14 - 44 %    Monocytes Relative 10 4 - 12 %    Eosinophils Relative 5 0 - 6 %    Basophils Relative 1 0 - 1 %    Neutrophils Absolute 3 09 1 85 - 7 62 Thousands/µL    Immature Grans Absolute 0 01 0 00 - 0 20 Thousand/uL    Lymphocytes Absolute 2 01 0 60 - 4 47 Thousands/µL    Monocytes Absolute 0 61 0 17 - 1 22 Thousand/µL    Eosinophils Absolute 0 33 0 00 - 0 61 Thousand/µL    Basophils Absolute 0 05 0 00 - 0 10 Thousands/µL       Current Facility-Administered Medications   Medication Dose Route Frequency Provider Last Rate Last Admin    acetaminophen (TYLENOL) tablet 650 mg  650 mg Oral Q6H PRN Shalini Thao MD        albuterol (PROVENTIL HFA,VENTOLIN HFA) inhaler 1 puff  1 puff Inhalation Q6H PRN Shalini Thao MD        budesonide (PULMICORT) inhalation solution 0 5 mg  0 5 mg Nebulization BID Shalini Thao MD   0 5 mg at 03/02/21 5754    busPIRone (BUSPAR) tablet 10 mg  10 mg Oral TID Shalini Thao MD   10 mg at 03/03/21 0011    cholestyramine sugar free (QUESTRAN LIGHT) packet 4 g  4 g Oral BID Shalini Thao MD   4 g at 03/03/21 0011    enoxaparin (LOVENOX) subcutaneous injection 40 mg  40 mg Subcutaneous Q24H Enzo Moyer MD        famotidine (PEPCID) tablet 20 mg  20 mg Oral Daily Shalini Thao MD        [facility]) tablet 5 mg  5 mg Oral QAM Joni Sheth MD        fluticasone [facility]) 50 mcg/act nasal spray 2 spray  2 spray Nasal Daily Joni Sheth MD        formoterol (PERFOROMIST) nebulizer solution 20 mcg  20 mcg Nebulization BID Joni Sheth MD        gabapentin (NEURONTIN) capsule 300 mg  300 mg Oral TID Joni Sheth MD   300 mg at 03/03/21 0011    ipratropium-albuterol (DUO-NEB) 0 5-2 5 mg/3 mL inhalation solution 3 mL  3 mL Nebulization Q6H PRN Joni Sheth MD        LORazepam (ATIVAN) tablet 1 mg  1 mg Oral BID Joni Sheth MD   1 mg at 03/03/21 0011    midodrine (PROAMATINE) tablet 2 5 mg  2 5 mg Oral TID Joni Sheth MD   2 5 mg at 03/03/21 0011    OLANZapine (ZyPREXA) tablet 7 5 mg  7 5 mg Oral HS Joni Sheth MD   7 5 mg at 03/03/21 0011    pantoprazole (PROTONIX) EC tablet 40 mg  40 mg Oral BID Joni Sheth MD   40 mg at 03/03/21 0011    tamsulosin (FLOMAX) capsule 0 4 mg  0 4 mg Oral Daily Joni Sheth MD        traZODone (DESYREL) tablet 100 mg  100 mg Oral HS Joni Sheth MD   100 mg at 03/03/21 0011       Invasive Devices     Peripheral Intravenous Line            Peripheral IV 03/02/21 Right Forearm less than 1 day                Lab, Imaging and other studies: I have personally reviewed pertinent reports      VTE Pharmacologic Prophylaxis: Enoxaparin (Lovenox)  VTE Mechanical Prophylaxis: sequential compression device    Fidencio Ortiz MD

## 2021-03-03 NOTE — ASSESSMENT & PLAN NOTE
· Stable at this time falls pulmonology  · Continue weekly Zemaira injections budesonide and Perforomist nebs

## 2021-03-03 NOTE — UTILIZATION REVIEW
Initial Clinical Review    Admission: Date/Time/Statement:   Admission Orders (From admission, onward)     Ordered        03/02/21 2128  Place in Observation  Once         03/02/21 1959  Inpatient Admission  Once,   Status:  Canceled                   Orders Placed This Encounter   Procedures    Place in Observation     Standing Status:   Standing     Number of Occurrences:   1     Order Specific Question:   Level of Care     Answer:   Med Surg [16]     ED Arrival Information     Expected Arrival Acuity Means of Arrival Escorted By Service Admission Type    - 3/2/2021 17:17 Urgent Ambulance [facility] General Medicine Urgent    Arrival Complaint    weakness        Chief Complaint   Patient presents with    Weakness - Generalized     pt reports of worsening gen weakness, pt was here Sunday for similar complaints  pt reports he is unable to get around the house or ambulate     Assessment/Plan:       67year old male presents to ed from home via ems for evaluation and treatment of generalized weakness  He was hospitalized a week ago and discharged with outpatient physical therapy  He had a session today and when he went home he was unable to stand up from cough  He dis not eat or drink and is fearful that he is unable to care for himself in his current living situation  PMHX: COPD, IBS, LIVER DISEASE, BPH  Clinical assessment significant for hypertension, temp 99 4, Ekg,  Labs and imaging without acute finding except platelets 413  Treated in ed with iv fluid bolus  Admit to observation for generalized weakness    Plan includes: PT/OT evaluations,  Repeat bmp and cbc    ED Triage Vitals   03/02/21 1724 03/02/21 1724 03/02/21 1724 03/02/21 1724 03/02/21 1724   99 4 °F (37 4 °C) 78 18 (!) 200/93 98 %      Tympanic Monitor         Pain Score       3          03/02/21 87 4 kg (192 lb 10 9 oz)     Additional Vital Signs:     Date/Time  Temp  Pulse  Resp  BP  MAP (mmHg)  SpO2  Calculated FIO2 (%) - Nasal Cannula Nasal Cannula O2 Flow Rate (L/min)  O2 Device    03/03/21 0934  --  --  --  --  --  95 %  --  --  --    03/03/21 07:45:16  97 5 °F (36 4 °C)  68  17  161/85  110  97 %  32  3 L/min  Nasal cannula    03/03/21 0726  --  --  --  --  --  96 %  32  3 L/min  Nasal cannula    03/03/21 03:33:27  98 °F (36 7 °C)  65  18  168/89  115  98 %  --  --  --    03/03/21 00:24:16  98 1 °F (36 7 °C)  70  18  165/78  107  97 %  --  --  --    03/03/21 0000  --  --  --  --  --  96 %  32  3 L/min  Nasal cannula    03/02/21 2337  --  72  18  --  --  97 %  --  --  --    03/02/21 22:14:46  98 8 °F (37 1 °C)  78  16  164/86  112  97 %  --  --  Nasal cannula    03/02/21 2143  --  71  20  164/81  --  97 %  32  3 L/min  Nasal cannula    03/02/21 2000  --  82  20  159/76  109  98 %  32  3 L/min  Nasal cannula    03/02/21 1908  --  78  18  198/85  Abnormal   --  98 %  --  --  None (Room air)                Pertinent Labs/Diagnostic Test Results:     ECG 12 Lead Documentation Only   Date/Time: 3/2/2021 5:34 PM       Indications / Diagnosis:  General weakness  ECG reviewed by me, the ED Provider: yes    Patient location:  ED  Interpretation:     Interpretation: normal    Rate:     ECG rate:  76    ECG rate assessment: normal    Rhythm:     Rhythm: sinus rhythm    Ectopy:     Ectopy: none    QRS:     QRS axis:  Normal    QRS intervals:  Normal  Conduction:     Conduction: normal    ST segments:     ST segments:  Normal  T waves:     T waves: normal           XR chest 1 view portable   (03/03 0616)      Emphysematous changes are noted  No focal consolidation, pleural effusion, or pneumothorax          Results from last 7 days   Lab Units 03/02/21  1743   SARS-COV-2  Negative     Results from last 7 days   Lab Units 03/03/21  0503 03/02/21  1741   WBC Thousand/uL 6 10 7 03   HEMOGLOBIN g/dL 13 2 13 6   HEMATOCRIT % 40 0 41 7   PLATELETS Thousands/uL 114* 135*   NEUTROS ABS Thousands/µL 3 09 4 72         Results from last 7 days   Lab Units 03/03/21  0503 03/02/21  1741   SODIUM mmol/L 138 136   POTASSIUM mmol/L 3 8 4 3   CHLORIDE mmol/L 104 102   CO2 mmol/L 27 26   ANION GAP mmol/L 7 8   BUN mg/dL 11 12   CREATININE mg/dL 1 04 1 09   EGFR ml/min/1 73sq m 69 65   CALCIUM mg/dL 8 6 8 7   MAGNESIUM mg/dL 1 8 1 7   PHOSPHORUS mg/dL 3 1  --              Results from last 7 days   Lab Units 03/03/21  0503 03/02/21  1741   GLUCOSE RANDOM mg/dL 101 115       Results from last 7 days   Lab Units 03/02/21  1741   CK TOTAL U/L 80     Results from last 7 days   Lab Units 03/02/21  1741   TROPONIN I ng/mL <0 02             Results from last 7 days   Lab Units 03/02/21  1741   TSH 3RD GENERATON uIU/mL 0 909     Results from last 7 days   Lab Units 03/02/21  1932   CLARITY UA  Clear   COLOR UA  Light Yellow   SPEC GRAV UA  1 020   PH UA  8 0   GLUCOSE UA mg/dl Negative   KETONES UA mg/dl Negative   BLOOD UA  Negative   PROTEIN UA mg/dl Negative   NITRITE UA  Negative   BILIRUBIN UA  Negative   UROBILINOGEN UA E U /dl 0 2   LEUKOCYTES UA  Negative     Results from last 7 days   Lab Units 03/02/21  1743   INFLUENZA A PCR  Negative   INFLUENZA B PCR  Negative   RSV PCR  Negative       ED Treatment:   Medication Administration from 03/02/2021 1717 to 03/02/2021 2213       Date/Time Order Dose Route Action     03/02/2021 1745 sodium chloride 0 9 % bolus 500 mL 500 mL Intravenous New Bag        Past Medical History:   Diagnosis Date    Anesthesia complication     Difficult to wake up    Arthritis     BPH (benign prostatic hyperplasia)     urinary frequency    Cancer (HCC)     basal cell neck, face    COPD (chronic obstructive pulmonary disease) (HCC)     COPD exacerbation ([facility] [address] ) 12/29/2019    Full dentures     Hiatal hernia     History of methicillin resistant staphylococcus aureus (MRSA)     10/11/2018 MRSA (nares) positive    Hypertension     Irritable bowel syndrome     Kidney stone     at least 7 episodes    Liver disease     Alpha 1- enzyme deficiency - diagnosed 2002  has been on weekly replacement therapy since then    [facility]     1/25/15  FEV1 - 2 45 liters or 59% of predicted    RSV infection 12/2017    Wears glasses     for driving only     Present on Admission:   AAT (alpha-1-antitrypsin) deficiency [facility]   Weakness generalized   Essential hypertension   [facility]ch[facility]) ([facility])   Gastroesophageal reflux disease      Admitting Diagnosis:     Syncope [R55]  Weakness [R53 1]  Chronic respiratory failure with hypoxia ([facility]) [J96 11]  Ambulatory dysfunction [R26 2]    Age/Sex: 67 y o  male     Admission Orders:    budesonide, 0 5 mg, Nebulization, BID  busPIRone, 10 mg, Oral, TID  cholestyramine sugar free, 4 g, Oral, BID  enoxaparin, 40 mg, Subcutaneous, Q24H CORIAN  famotidine, 20 mg, Oral, Daily  finasteride, 5 mg, Oral, QAM  fluticasone, 2 spray, Nasal, Daily  formoterol, 20 mcg, Nebulization, BID  gabapentin, 300 mg, Oral, TID  LORazepam, 1 mg, Oral, BID  midodrine, 2 5 mg, Oral, TID  OLANZapine, 7 5 mg, Oral, HS  pantoprazole, 40 mg, Oral, BID  tamsulosin, 0 4 mg, Oral, Daily  traZODone, 100 mg, Oral, HS      Continuous IV Infusions:     PRN Meds:  acetaminophen, 650 mg, Oral, Q6H PRN  albuterol, 1 puff, Inhalation, Q6H PRN  ipratropium-albuterol, 3 mL, Nebulization, Q6H PRN        IP CONSULT TO CASE MANAGEMENT    Network Utilization Review Department  ATTENTION: Please call with any questions or concerns to [phone] and carefully listen to the prompts so that you are directed to the right person  All voicemails are confidential   Lisa Guido all requests for admission clinical reviews, approved or denied determinations and any other requests to dedicated fax number below belonging to the campus where the patient is receiving treatment   List of dedicated fax numbers for the Facilities:  [address] [place] [address] DENIALS (Administrative/Medical Necessity) [phone]   [address] [place] [address] (Maternity/NICU/Pediatrics) [address] [facility] [address] [place] [address]  [phone]   [address] (  Miguel [address]ane[address] "[address]) [address] [place] [address] [address] [phone]   [place] [address] [phone]

## 2021-03-03 NOTE — ASSESSMENT & PLAN NOTE
· Patient presents with worsening ongoing generalized weakness  Was recently admitted for this same complaint  Was following with PT today and complained of feeling weak and tired  Per PT note from today he had 2nd COVID vaccine shot yesterday; possibly feeling tired/weak and thigh pain secondary to side effects of COVID vaccine    · ER: TSH 0 9, Na 136, K 4 3, CR 1 09, mg 1 7, CK WNL, trop neg, WBC 7, HB 13 6, COVID/flu/RSV neg, IV  X 1  · PT/OT consult appreciate recommendations  · Case management  · Fall precautions in place

## 2021-03-03 NOTE — PROGRESS NOTES
Chart review done  Outreach via TT to inpatient management to discuss patients needs   CM will remain available to assist

## 2021-03-03 NOTE — CASE MANAGEMENT
LOS - 1 day (obs)    SW met with pt and spoke to daughter, Yamel Esposito, to assess needs and discuss plans  Discussed goals of making sure pt's needs are met upon discharge and that Freedom of Choice is offered  Pt lives alone in his home  Pt was relatively independent PTA  Per daughter pt has been having increased difficulty with meals and self care at home  Per daughter pt has his oxygen, walker and cane at home  Pt has had home care and rehab in the past but nothing recently  Per notes pt has had home care services through [facility] and rehab at [address] [place] [address], [facility]  No MH or D&A issues  Pt's PCP is Dr Joana Tan MD   Per daughter pt has prescription coverage and has no difficulty getting his medication as prescribed  Preferred pharmacy is [facility]  Pt does have POA/advanced directives  No copy on chart, copy requested  Pt's daughter is POA  Discussed discharge plans with pt and daughter  [facility] placement is being recommended  Both are open to rehab due to the increased weakness pt has been experiencing  Provided pt with list of facilities to review  Pt expressed no specific preference, stated that the facilities in [facility] would be fine to make referrals to  In speaking with daughter she expressed same  Daughter requested referral be made to [facility] along with [facility] facilities  Discussed transportation options with daughter  Daughter requested transport be arranged for pt due to need for oxygen  Referrals will be made  SW will continue to follow to monitor progress and assist with planning as needed

## 2021-03-03 NOTE — ED NOTES
Placed pt in sitting position on side of bed to urinate,call bell in reach       Amber Vilchis, EMY  03/02/21 1928

## 2021-03-03 NOTE — ED NOTES
Received patient sitting in bed  Pt calm and pleasant  Placed patient back on monitor  Asked patient to try to lift leg and can only hold up for less than 5 seconds and stating it feels like a muscle spasm on both thighs when he lifts  Pt also stating it feels like pins and needles on his feet most of the time         Aisha Askew RN  03/02/21 100 Judy Khan , [address] [place] [address]  03/02/21 2276

## 2021-03-04 ENCOUNTER — TRANSITIONAL CARE MANAGEMENT (OUTPATIENT)
Dept: FAMILY MEDICINE CLINIC | Facility: CLINIC | Age: 76
End: 2021-03-04

## 2021-03-04 ENCOUNTER — PATIENT OUTREACH (OUTPATIENT)
Dept: FAMILY MEDICINE CLINIC | Facility: CLINIC | Age: 76
End: 2021-03-04

## 2021-03-04 NOTE — PROGRESS NOTES
Chart review done  Pt discharged to [facility] in [place] for [address] [place]  CM will continue to follow

## 2021-03-04 NOTE — PHYSICIAN ADVISOR
Current patient class: Inpatient  The patient is currently on Hospital Day: 3 at [address] [place] [address]      The patient was admitted to the hospital  on 2/23/21 at 1819 for the following diagnosis:  Weakness [R53 1]  Leg weakness [R29 898]     After review of the relevant documentation, labs, vital signs and test results, this is a PROVIDER LIABLE case  In this particular case the patient was admitted to the hospital as an inpatient  The patient however failed to satisfy the 2 midnight benchmark and closer scrutiny of the case was warranted  After review of the patient presentation and relevant labs the patient was most appropriate for observation or outpatient class at the time of admission  Given that this patient has already been discharged prior to this review they become a provider liable case  Rationale is as follows: The patient is a 67 yrs   Male who presented to the ED at 2/22/2021  7:54 PM with a chief complaint of Fall (pt brought in by EMS after b/l leg weakness and "they gave out" pt fell denies hitting head, no LOC, no thinners  pt c/o b/l upper leg pain)   Patient was admitted to the hospital for generalized weakness and ambulatory dysfunction  COVID test negative, troponin, cbc and cpk were unremarkable  Cr was elevated to  1 37 and he was given IVF 125cc/hr with improvement in cr to normal range  PT/OT was consulted who recommended physical therapy   Case Management also consulted given patient lives alone  [facility] PT OT was recommended  He is observation appropriate and thus a provider liable case       The patients vitals on arrival were   ED Triage Vitals [02/22/21 1957]   Temperature Pulse Respirations Blood Pressure SpO2   98 °F (36 7 °C) 82 18 140/69 95 %      Temp Source Heart Rate Source Patient Position - Orthostatic VS BP Location FiO2 (%)   Tympanic Monitor Lying Left arm --      Pain Score       3           Past Medical History:   Diagnosis Date    Anesthesia complication     Difficult to wake up    Arthritis     BPH (benign prostatic hyperplasia)     urinary frequency    Cancer (HCC)     basal cell neck, face    COPD (chronic obstructive pulmonary disease) (HCC)     COPD exacerbation ([facility] [facility] ) 12/29/2019    Full dentures     Hiatal hernia     History of methicillin resistant staphylococcus aureus (MRSA)     10/11/2018 MRSA (nares) positive    Hypertension     Irritable bowel syndrome     Kidney stone     at least 7 episodes    Liver disease     Alpha 1- enzyme deficiency - diagnosed 2002  has been on weekly replacement therapy since then    Pulmonary emphysema ([facility] [address] )     1/25/15  FEV1 - 2 45 liters or 59% of predicted    RSV infection 12/2017    Wears glasses     for driving only     Past Surgical History:   Procedure Laterality Date    BACK SURGERY  2008    discectomy    COLONOSCOPY      COLONOSCOPY N/A 3/10/2017    Procedure: Dillan Otto;  Surgeon: Mingo Barfield MD;  Location: [place] [address] GI LAB; Service:    Star Tiago      removal of kidney stones    ESOPHAGOGASTRODUODENOSCOPY N/A 3/10/2017    Procedure: ESOPHAGOGASTRODUODENOSCOPY (EGD); Surgeon: Mingo Barfield MD;  Location: [facility] GI LAB; Service:     LITHOTRIPSY      CA ESOPHAGOGASTRODUODENOSCOPY TRANSORAL DIAGNOSTIC N/A 11/20/2018    Procedure: ESOPHAGOGASTRODUODENOSCOPY (EGD); Surgeon: Mingo Barfield MD;  Location: [facility] GI LAB;   Service: Gastroenterology    TONSILLECTOMY      VEIN LIGATION AND STRIPPING Bilateral     1980's           Consults have been placed to:   IP CONSULT TO CASE MANAGEMENT    Vitals:    02/24/21 2020 02/24/21 2059 02/24/21 2102 02/24/21 2303   BP: 123/72 110/62 95/61 112/64   Pulse: (!) 42 (!) 37  57   Resp: 19   20   Temp: (!) 97 1 °F (36 2 °C)   (!) 97 2 °F (36 2 °C)   TempSrc:       SpO2: 97% 97%  97%   Weight:       Height:           Most recent labs:    Recent Labs     03/02/21  1741 03/03/21  0503   WBC 7 03 6 10   HGB 13 6 13 2   HCT 41 7 40 0   * 114*   K 4 3 3 8   CALCIUM 8 7 8 6   BUN 12 11   CREATININE 1 09 1 04   TROPONINI <0 02  --    CKTOTAL 80  --        Scheduled Meds:  Continuous Infusions:No current facility-administered medications for this encounter  PRN Meds:      Surgical procedures (if appropriate):

## 2021-03-04 NOTE — PHYSICIAN ADVISOR
Current patient class: Inpatient  The patient is currently on Hospital Day: 3 at [address] [place] [address]      The patient was admitted to the hospital at [address] on 2/23/21 for the following diagnosis:  Weakness [R53 1]  Leg weakness [R29 898]       There is documentation in the medical record of an expected length of stay of at least 2 midnights  The patient is therefore expected to satisfy the 2 midnight benchmark and given the 2 midnight presumption is appropriate for INPATIENT ADMISSION  Given this expectation of a satisfying stay, CMS instructs us that the patient is most often appropriate for inpatient admission under part A provided medical necessity is documented in the chart  After review of the relevant documentation, labs, vital signs and test results, the patient is appropriate for INPATIENT ADMISSION  Admission to the hospital as an inpatient is a complex decision making process which requires the practitioner to consider the patients presenting complaint, history and physical examination and all relevant testing  With this in mind, in this case, the patient was deemed appropriate for INPATIENT ADMISSION  After review of the documentation and testing available at the time of the admission I concur with this clinical determination of medical necessity  Rationale is as follows: The patient is a 67 yrs old Male who presented to the ED at 2/22/2021  7:54 PM with a chief complaint of Fall (pt brought in by EMS after b/l leg weakness and "they gave out" pt fell denies hitting head, no LOC, no thinners  pt c/o b/l upper leg pain) Patient with history of lung mass, severe copd and pulmonary HTN, presents for generalized weakness  Patient was admitted to the hospital for generalized weakness and ambulatory dysfunction  COVID test negative, troponin, cbc and cpk were unremarkable  Cr was elevated to  1 37 and he was given IVF 125cc/hr with improvement in cr to normal range   PT/OT was consulted who recommended physical therapy  Case Management also consulted given patient lives alone  Home PT OT was recommended however patient prefers outpatient PT OT and states he will be adherent  Uses a cane as walker and is independent with ADLs and able to drive  Patient to follow-up with PCP to ensure strengthening PT/OT    The patients vitals on arrival were   ED Triage Vitals [02/22/21 1957]   Temperature Pulse Respirations Blood Pressure SpO2   98 °F (36 7 °C) 82 18 140/69 95 %      Temp Source Heart Rate Source Patient Position - Orthostatic VS BP Location FiO2 (%)   Tympanic Monitor Lying Left arm --      Pain Score       3           Past Medical History:   Diagnosis Date    Anesthesia complication     Difficult to wake up    Arthritis     BPH (benign prostatic hyperplasia)     urinary frequency    Cancer (HCC)     basal cell neck, face    COPD (chronic obstructive pulmonary disease) (HCC)     COPD exacerbation (HCC) 12/29/2019    Full dentures     Hiatal hernia     History of methicillin resistant staphylococcus aureus (MRSA)     10/11/2018 MRSA (nares) positive    Hypertension     Irritable bowel syndrome     Kidney stone     at least 7 episodes    Liver disease     Alpha 1- enzyme deficiency - diagnosed 2002  has been on weekly replacement therapy since then    Pulmonary emphysema ([facility] [address] )     1/25/15  FEV1 - 2 45 liters or 59% of predicted    RSV infection 12/2017    Wears glasses     for driving only     Past Surgical History:   Procedure Laterality Date    BACK SURGERY  2008    discectomy    COLONOSCOPY      COLONOSCOPY N/A 3/10/2017    Procedure: Dillan Otto;  Surgeon: Mingo Barfield MD;  Location: [facility] GI LAB; Service:    Star Tiago      removal of kidney stones    ESOPHAGOGASTRODUODENOSCOPY N/A 3/10/2017    Procedure: ESOPHAGOGASTRODUODENOSCOPY (EGD); Surgeon: Mingo Barfield MD;  Location: [facility] GI LAB;   Service:     LITHOTRIPSY      MN ESOPHAGOGASTRODUODENOSCOPY TRANSORAL DIAGNOSTIC N/A 11/20/2018    Procedure: ESOPHAGOGASTRODUODENOSCOPY (EGD); Surgeon: Malaika Aburto MD;  Location: [facility] GI LAB; Service: Gastroenterology    TONSILLECTOMY      VEIN LIGATION AND STRIPPING Bilateral     1980's           Consults have been placed to:   IP CONSULT TO CASE MANAGEMENT    Vitals:    02/24/21 2020 02/24/21 2059 02/24/21 2102 02/24/21 2303   BP: 123/72 110/62 95/61 112/64   Pulse: (!) 42 (!) 37  57   Resp: 19   20   Temp: (!) 97 1 °F (36 2 °C)   (!) 97 2 °F (36 2 °C)   TempSrc:       SpO2: 97% 97%  97%   Weight:       Height:           Most recent labs:    Recent Labs     03/02/21  1741 03/03/21  0503   WBC 7 03 6 10   HGB 13 6 13 2   HCT 41 7 40 0   * 114*   K 4 3 3 8   CALCIUM 8 7 8 6   BUN 12 11   CREATININE 1 09 1 04   TROPONINI <0 02  --    CKTOTAL 80  --        Scheduled Meds:  Continuous Infusions:No current facility-administered medications for this encounter  PRN Meds:      Surgical procedures (if appropriate):

## 2021-03-05 ENCOUNTER — APPOINTMENT (OUTPATIENT)
Dept: PHYSICAL THERAPY | Facility: CLINIC | Age: 76
End: 2021-03-05
Payer: MEDICARE

## 2021-03-05 LAB
ATRIAL RATE: 76 BPM
P AXIS: 110 DEGREES
PR INTERVAL: 176 MS
QRS AXIS: 19 DEGREES
QRSD INTERVAL: 96 MS
QT INTERVAL: 398 MS
QTC INTERVAL: 447 MS
T WAVE AXIS: 28 DEGREES
VENTRICULAR RATE: 76 BPM

## 2021-03-05 PROCEDURE — 93010 ELECTROCARDIOGRAM REPORT: CPT | Performed by: INTERNAL MEDICINE

## 2021-03-09 ENCOUNTER — APPOINTMENT (OUTPATIENT)
Dept: PHYSICAL THERAPY | Facility: CLINIC | Age: 76
End: 2021-03-09
Payer: MEDICARE

## 2021-03-12 ENCOUNTER — APPOINTMENT (OUTPATIENT)
Dept: PHYSICAL THERAPY | Facility: CLINIC | Age: 76
End: 2021-03-12
Payer: MEDICARE

## 2021-03-16 ENCOUNTER — APPOINTMENT (OUTPATIENT)
Dept: PHYSICAL THERAPY | Facility: CLINIC | Age: 76
End: 2021-03-16
Payer: MEDICARE

## 2021-03-19 ENCOUNTER — APPOINTMENT (OUTPATIENT)
Dept: PHYSICAL THERAPY | Facility: CLINIC | Age: 76
End: 2021-03-19
Payer: MEDICARE

## 2021-03-23 ENCOUNTER — APPOINTMENT (OUTPATIENT)
Dept: PHYSICAL THERAPY | Facility: CLINIC | Age: 76
End: 2021-03-23
Payer: MEDICARE

## 2021-03-23 NOTE — CASE MANAGEMENT
Discharge ordered  Pt will be transferred to [facility] for skilled rehab  [facility] contacted to arrange transport  [facility] scheduled to transport around 7:00 pm   RN (Driss/Mark), [facility] and pt/daughter [facility]) aware of plan  Pulmonary Progress Note    Patient: Ora Monique Date: 3/23/2021   : 1956 Attending: Elaine Escalante MD   65 year old female      3/1/2021    Subjective: AAOC--Chest wall pain significantly improved    Pertinent Reviewed: All pertinent labs, notes, images,and tests reviewed    Current Medication:  • folic acid  1 mg Oral Daily   • fidaxomicin  200 mg Oral 2 times per day   • dronabinol  5 mg Oral BID   • melatonin  9 mg Oral Nightly   • gabapentin  100 mg Oral Nightly   • mirtazapine  7.5 mg Oral Nightly   • psyllium  1 packet Oral Daily   • traMADol  50 mg Oral 3 times per day   • GuaiFENesin  1,200 mg Oral BID   • acetaminophen  650 mg Oral 4x Daily   • gabapentin  100 mg Oral TID   • enoxaparin  40 mg Subcutaneous Q24H   • sodium chloride (PF)  2 mL Intracatheter 2 times per day   • lidocaine  1 patch Transdermal Daily   • ipratropium-albuterol  3 mL Nebulization Q6H Resp   • Potassium Standard Replacement Protocol   Does not apply See Admin Instructions   • Magnesium Standard Replacement Protocol   Does not apply See Admin Instructions   • Phosphorus Standard Replacement Protocol   Does not apply See Admin Instructions       Rhythm:    Vital 24 Hour Range Last Value   Temperature Temp  Min: 97.9 °F (36.6 °C)  Max: 98.4 °F (36.9 °C) 97.9 °F (36.6 °C) (21)   Pulse Pulse  Min: 109  Max: 120 (!) 115 (21)   Respiratory Resp  Min: 20  Max: 22 20 (21)   Non-Invasive  Blood Pressure BP  Min: 118/56  Max: 126/68 125/59 (21)   Arterial  Blood Pressure No data recorded 136/63 (21 1100)   Pulse Oximetry SpO2  Min: 94 %  Max: 98 % 94 % (21)     Vital Admission Last Value   Weight Weight: 78.2 kg (21 1448) 76.7 kg (21)   Height N/A 5' 6\" (167.6 cm) (21 0900)   BMI N/A 27.29 (21)       Intake/Output:    I/O this shift:  In: 120 [P.O.:120]  Out: -         Intake/Output Summary (Last 24 hours) at 3/23/2021 0660  Last data  filed at 3/23/2021 0456  Gross per 24 hour   Intake 480 ml   Output --   Net 480 ml     Physical Exam:   HEENT:  Fair remaining dentition.  No oral lesions or exudate.  No lymphadenopathy.  No evidence of malar rash or lupus pernio.   CHEST: Fine mid inspiratory crackles, right greater than left upper lobe, few other scattered basilar crackles. 5 LPM cannula--O2 saturation 94-98%. NPC   CARDIAC:  S1, S2, regular/tachy, without S3, S4 or murmur.  ABDOMEN:  Soft, BS +    EXTREMITIES:  Without clubbing, cyanosis, edema, rashes, or skin lesions.    Laboratory Results:    Recent Labs   Lab 03/23/21  0548 03/22/21  0620 03/21/21  0519 03/20/21  1542 03/20/21  0637   WBC 7.8 10.3 12.3*  --  14.3*   HCT 32.1* 32.1* 31.4*  --  31.4*   HGB 10.6* 10.5* 10.4*  --  10.6*    289 265  --  260   SODIUM  --  140 141  --  141   POTASSIUM  --  3.7 3.8 3.9 3.5   CHLORIDE  --  110* 111*  --  111*   CO2  --  25 25  --  26   GLUCOSE  --  98 98  --  99   BUN  --  9 10  --  11   CREATININE  --  0.54 0.52  --  0.50*     SINDHU--Negative  ANCA (MPO/PR3)--Negative  RA factor <10  ACE level 7  ESR 16    Cultures:   COVID screen 03/01--Negative.    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    Some intralobular thickening    Echocardiogram 01/05 ([facility])  EF 51%.    Grade 2 diastolic dysfunction.   RVSP 37 mmHg.       Left heart catheterization 01/22 (Dr. Chai Aguero):    No significant coronary artery disease    Assessment:   1.  Extensive pneumonia with an upper lobe/peripheral distribution.    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  Also had cough and yellow to tan secretions.  Prior to this, she had diarrhea, dysphagia and weight loss, was hospitalized at [facility] early January.  Some of the symptoms improved after dilatation of a Schatzki ring.  References made to [facility] with a right basilar limited opacity.      There has been marked progression based on available films currently.  Her comparison x-ray 06/11/2020 through [place] is clear.  Differential includes fungal etiologies, mycobacterial disease (although unusual distribution and appearance), an autoimmune process such as chronic eosinophilic pneumonitis, although the opacities are much more dense than would be anticipated.  She has no peripheral eosinophilia.    Received OP levofloxacin & prednisone 2/24-->3/01-->No improvement     RECOMMENDATIONS:  1.  Developed increased respiratory distress / worsening hypoxia 3/08    -->Intubated / PRVC      --Extensive/progressive pulmonary involvement / lymphangitic   invasion   -->Extubated 3/11    --Transitioned to nasal cannula--Moderate flow    --DNI    2.  Pain control-- R chest wall discomfort significantly improved    3.  Completed corticosteroids --> secretions / inflammatory component    --Stopped 3/13     4.  Oncology (Dr Peacock) evaluation appreciated   --Limited options for Rx    --Considering  palliative chemotherapy     --Carboplatin-pemetrexed-pembrolizumab         --Solid tumor mutational testing noted     --Some results still pending     --Oncology to evaluating Rx options     --Facillitate transition to hospice    5.  Aggressive pulmonary toilette   --Flutter valve / guaifenesin added    6.  C Dif colitis   --PO vancomycin-->fidaxomicin    Palliative care  / home care-->hospice

## 2021-03-26 ENCOUNTER — APPOINTMENT (OUTPATIENT)
Dept: PHYSICAL THERAPY | Facility: CLINIC | Age: 76
End: 2021-03-26
Payer: MEDICARE

## 2021-03-31 ENCOUNTER — TELEPHONE (OUTPATIENT)
Dept: FAMILY MEDICINE CLINIC | Facility: CLINIC | Age: 76
End: 2021-03-31

## 2021-03-31 NOTE — TELEPHONE ENCOUNTER
Yaneth Prakash [employer] is doing OT 1xweek for 8 weeks     Pt reported fall yesterday  Inside home - EMS was called to assist to stand

## 2021-04-02 ENCOUNTER — TELEPHONE (OUTPATIENT)
Dept: FAMILY MEDICINE CLINIC | Facility: CLINIC | Age: 76
End: 2021-04-02

## 2021-04-02 NOTE — TELEPHONE ENCOUNTER
Patient is requesting a new form be completed as his Handicap Placard has been stolen or misplaced, informed Patient he will need to come into the office to complete [employer]  Once form has been filled out by Patient it will given to the PCP and it can take up to 5-7 days for form completion  Patient expressing understanding and does not have any questions at this time   Informed Patient of office hours

## 2021-04-05 ENCOUNTER — OFFICE VISIT (OUTPATIENT)
Dept: FAMILY MEDICINE CLINIC | Facility: CLINIC | Age: 76
End: 2021-04-05
Payer: MEDICARE

## 2021-04-05 ENCOUNTER — PATIENT OUTREACH (OUTPATIENT)
Dept: FAMILY MEDICINE CLINIC | Facility: CLINIC | Age: 76
End: 2021-04-05

## 2021-04-05 VITALS
DIASTOLIC BLOOD PRESSURE: 88 MMHG | BODY MASS INDEX: 21.9 KG/M2 | SYSTOLIC BLOOD PRESSURE: 148 MMHG | TEMPERATURE: 98.4 F | WEIGHT: 185.5 LBS | HEART RATE: 82 BPM | OXYGEN SATURATION: 92 % | HEIGHT: 77 IN

## 2021-04-05 DIAGNOSIS — F33.9 DEPRESSION, RECURRENT (HCC): ICD-10-CM

## 2021-04-05 DIAGNOSIS — M87.051 AVASCULAR NECROSIS OF HIP, RIGHT (HCC): ICD-10-CM

## 2021-04-05 DIAGNOSIS — L97.929 VENOUS ULCER OF LEFT LOWER EXTREMITY WITH VARICOSE VEINS (HCC): ICD-10-CM

## 2021-04-05 DIAGNOSIS — D64.9 ANEMIA, UNSPECIFIED TYPE: ICD-10-CM

## 2021-04-05 DIAGNOSIS — E88.01 AAT (ALPHA-1-ANTITRYPSIN) DEFICIENCY (HCC): ICD-10-CM

## 2021-04-05 DIAGNOSIS — E55.9 VITAMIN D DEFICIENCY DISEASE: ICD-10-CM

## 2021-04-05 DIAGNOSIS — J96.11 CHRONIC RESPIRATORY FAILURE WITH HYPOXIA (HCC): ICD-10-CM

## 2021-04-05 DIAGNOSIS — D69.6 THROMBOCYTOPENIA (HCC): ICD-10-CM

## 2021-04-05 DIAGNOSIS — I83.029 VENOUS ULCER OF LEFT LOWER EXTREMITY WITH VARICOSE VEINS (HCC): ICD-10-CM

## 2021-04-05 DIAGNOSIS — R26.2 AMBULATORY DYSFUNCTION: Primary | ICD-10-CM

## 2021-04-05 DIAGNOSIS — I27.20 PULMONARY HYPERTENSION (HCC): ICD-10-CM

## 2021-04-05 PROCEDURE — 99214 OFFICE O/P EST MOD 30 MIN: CPT | Performed by: FAMILY MEDICINE

## 2021-04-05 NOTE — PROGRESS NOTES
Subjective:           Problem List Items Addressed This Visit        Digestive    AAT (alpha-1-antitrypsin) deficiency (HCC) eatable cont oxygen medications       Respiratory    Chronic respiratory failure with hypoxia (HCC) f/u Pulmonary       Cardiovascular and Mediastinum    Pulmonary hypertension (HCC) stable cont medications       Other    Ambulatory dysfunction - Primary PT/OT exercise high protein diet    Vitamin D deficiency disease take 3,000 units daily              No orders of the defined types were placed in this encounter  Patient Instructions   Stay current with pulmonary follow-up routine health maintenance screening vaccinations  Palliative care continue occupational       vitamin D 3,000 units daily    BMI Counseling: Body mass index is 22 kg/m²  Discussed the patient's BMI with him  [facility] [place] [address]    Chief Complaint   Patient presents with    Follow-up     follow up from rehab discharge     HPI follow up COPD GERD alpha 1 def HTN h/o of PE last hgb 13 on o2 ambulatory    /88   Pulse 82   Temp 98 4 °F (36 9 °C) (Tympanic)   Ht 6' 5" (1 956 m)   Wt 84 1 kg (185 lb 8 oz)   SpO2 92%   BMI 22 00 kg/m²       Allergies   Allergen Reactions    Chantix [Varenicline]      Caused prostate infection       Current Outpatient Medications on File Prior to Visit   Medication Sig Dispense Refill    acetaminophen (TYLENOL) 500 mg tablet Take 2 tablets (1,000 mg total) by mouth every 8 (eight) hours (Patient not taking: Reported on 3/3/2021) 90 tablet 3    budesonide (PULMICORT) 0 5 mg/2 mL nebulizer solution Take 1 vial (0 5 mg total) by nebulization 2 (two) times a day Rinse mouth after use   60 vial 11    busPIRone (BUSPAR) 10 mg tablet Take 1 tablet (10 mg total) by mouth 3 (three) times a day 90 tablet 2    cholestyramine sugar free (QUESTRAN LIGHT) 4 g packet Take 1 packet (4 g total) by mouth 2 (two) times a day 60 packet 1    Diclofenac Sodium (VOLTAREN) 1 % Apply 2 g topically 4 (four) times a day Apply to R knee 50 g 0    doxylamine (Unisom SleepTabs) 25 MG tablet Take 25 mg by mouth daily at bedtime as needed for sleep      famotidine (PEPCID) 20 mg tablet Take 1 tablet (20 mg total) by mouth daily (Patient not taking: Reported on 3/3/2021) 30 tablet 3    finasteride (PROSCAR) 5 mg tablet Take 1 tablet (5 mg total) by mouth every morning (Patient not taking: Reported on 3/3/2021) 90 tablet 3    fluticasone (FLONASE) 50 mcg/act nasal spray 2 sprays into each nostril daily 16 g 5    formoterol (Perforomist) 20 MCG/2ML nebulizer solution Take 1 vial (20 mcg total) by nebulization 2 (two) times a day 60 vial 11    gabapentin (NEURONTIN) 300 mg capsule Take 1 capsule (300 mg total) by mouth 3 (three) times a day 90 capsule 6    guaiFENesin (ROBITUSSIN) 100 MG/5ML oral liquid Take 200 mg by mouth 2 (two) times a day      ipratropium-albuterol (DUO-NEB) 0 5-2 5 mg/3 mL nebulizer solution take 1 vial (3ml) by nebulization four times a day as needed  360 mL 0    LORazepam (ATIVAN) 1 mg tablet TAKE ONE TABLET BY MOUTH TWICE DAILY  60 tablet 3    midodrine (PROAMATINE) 2 5 mg tablet Take 1 tablet (2 5 mg total) by mouth 3 (three) times a day 90 tablet 11    OLANZapine (ZyPREXA) 5 mg tablet Take 1 5 tablets (7 5 mg total) by mouth daily at bedtime 45 tablet 2    OXYGEN-HELIUM IN Inhale 2L at rest- 3L with activity      pantoprazole (PROTONIX) 40 mg tablet Take 1 tablet (40 mg total) by mouth 2 (two) times a day 180 tablet 3    tamsulosin (FLOMAX) 0 4 mg Half a tablet daily (Patient not taking: Reported on 3/3/2021) 30 capsule 5    tamsulosin (FLOMAX) 0 4 mg Take 0 4 mg by mouth daily      traZODone (DESYREL) 100 mg tablet Take 1 tablet (100 mg total) by mouth daily at bedtime 30 tablet 2    Ventolin  (90 Base) MCG/ACT inhaler Inhale 1 puff every 6 (six) hours as needed for shortness of breath 1 Inhaler 11    ZEMAIRA infusion once a week       [DISCONTINUED] busPIRone (BUSPAR) 10 mg tablet TAKE ONE TABLET BY MOUTH THREE TIMES DAILY  90 tablet 0     No current facility-administered medications on file prior to visit  Past Medical History:   Diagnosis Date    Anesthesia complication     Difficult to wake up    Arthritis     BPH (benign prostatic hyperplasia)     urinary frequency    Cancer (HCC)     basal cell neck, face    COPD (chronic obstructive pulmonary disease) (HCC)     [facility]ation [facility] [address] [facility] ) 12/29/2019    Full dentures     Hiatal hernia     History of methicillin resistant staphylococcus aureus (MRSA)     10/11/2018 MRSA (nares) positive    Hypertension     Irritable bowel syndrome     Kidney stone     at least 7 episodes    Liver disease     Alpha 1- enzyme deficiency - diagnosed 2002  has been on weekly replacement therapy since then    [facility]     1/[facility]15  FEV1 - 2 45 liters or 59% of predicted    RSV infection 12/2017    Wears glasses     for driving only       Past Surgical History:   Procedure Laterality Date    BACK SURGERY  2008    discectomy    COLONOSCOPY      COLONOSCOPY N/A 3/10/2017    Procedure: Denny Lara;  Surgeon: Jerilyn Rose MD;  Location: [place] [address] GI LAB; Service:    Carlos Eduardo Davis      removal of kidney stones    ESOPHAGOGASTRODUODENOSCOPY N/A 3/10/2017    Procedure: ESOPHAGOGASTRODUODENOSCOPY (EGD); Surgeon: Jerilyn Rose MD;  Location: [facility] GI LAB; Service:     LITHOTRIPSY      IN ESOPHAGOGASTRODUODENOSCOPY TRANSORAL DIAGNOSTIC N/A 11/20/2018    Procedure: ESOPHAGOGASTRODUODENOSCOPY (EGD); Surgeon: Jerilyn Rose MD;  Location: [facility] GI LAB; Service: Gastroenterology    TONSILLECTOMY      VEIN LIGATION AND STRIPPING Bilateral     1980's          reports that he quit smoking about 3 years ago  He has a 60 00 pack-year smoking history  He has never used smokeless tobacco  He reports previous alcohol use  He reports previous drug use       reports that he quit smoking about 3 years ago  He has a 60 00 pack-year smoking history  He has never used smokeless tobacco         Review of Systems   Constitutional: Negative for fatigue and fever  HENT: Positive for dental problem  Negative for congestion, ear discharge, ear pain, facial swelling, mouth sores, nosebleeds, postnasal drip, trouble swallowing and voice change  Edentulous   Eyes: Positive for visual disturbance  Left   Respiratory: Positive for shortness of breath  Negative for cough  On 02 3l   Cardiovascular: Negative for chest pain, palpitations and leg swelling  Gastrointestinal: Negative for abdominal distention and blood in stool  Genitourinary: Negative for flank pain, hematuria and penile pain  Musculoskeletal: Positive for arthralgias, gait problem and myalgias  Cane weakness LE PT at home   Skin: Negative for pallor  Neurological: Negative for seizures, facial asymmetry, speech difficulty and light-headedness  Hematological: Negative for adenopathy  Bruises/bleeds easily  Psychiatric/Behavioral: Negative for behavioral problems and decreased concentration  The patient is not nervous/anxious  Physical Exam  Vitals signs and nursing note reviewed  Constitutional:       General: He is not in acute distress  Appearance: He is well-developed  He is ill-appearing  He is not toxic-appearing  Comments: Frail    o2 3l NC   HENT:      Head: Normocephalic and atraumatic  Right Ear: External ear normal       Left Ear: External ear normal       Mouth/Throat:      Mouth: Mucous membranes are moist       Pharynx: No oropharyngeal exudate  Eyes:      General: No scleral icterus  Right eye: No discharge  Left eye: No discharge  Conjunctiva/sclera: Conjunctivae normal       Pupils: Pupils are equal, round, and reactive to light  Comments: L catarct   Neck:      Musculoskeletal: Normal range of motion and neck supple        Trachea: No tracheal deviation  Cardiovascular:      Rate and Rhythm: Normal rate and regular rhythm  Heart sounds: No murmur  No friction rub  No gallop  Pulmonary:      Effort: Pulmonary effort is normal  No respiratory distress  Breath sounds: No stridor  No wheezing or rales  Comments: Distant exp wheeze on o2 @3 l nc  Abdominal:      General: Bowel sounds are normal  There is no distension  Palpations: Abdomen is soft  Tenderness: There is no guarding or rebound  Musculoskeletal:         General: No deformity  Lymphadenopathy:      Cervical: No cervical adenopathy  Skin:     General: Skin is warm and dry  Capillary Refill: Capillary refill takes 2 to 3 seconds  Findings: No rash  Comments: AK   nose hands   Neurological:      General: No focal deficit present  Mental Status: He is alert and oriented to person, place, and time  Mental status is at baseline  Cranial Nerves: No cranial nerve deficit  Motor: Weakness present  No abnormal muscle tone  Coordination: Coordination normal       Gait: Gait abnormal       Comments: cane   Psychiatric:         Behavior: Behavior normal          Thought Content:  Thought content normal          Judgment: Judgment normal

## 2021-04-05 NOTE — PATIENT INSTRUCTIONS
Stay current with pulmonary follow-up routine health maintenance screening vaccinations    Palliative care continue occupational       vitamin D 3,000 units daily

## 2021-04-05 NOTE — LETTER
2021     Patient: Rox Torres   YOB: 1945   Date of Visit: 2021       To Whom it May Concern:    Swetha Alva is under my professional care  The above named patient, Swetha Alva  1945, has a medical necessity for a handicap placard  The patients diagnosis is: cannot walk 200 feet without stopping to rest, uses portable oxygen and is severely limited in his ability to walk due to an arthritic, neurological or orthopedic condition  If you have any questions, please feel free to call our office  Thank you  If you have any questions or concerns, please don't hesitate to call           Sincerely,          Sapna Guzmán MD        CC: No Recipients

## 2021-04-06 ENCOUNTER — OFFICE VISIT (OUTPATIENT)
Dept: PALLIATIVE MEDICINE | Facility: CLINIC | Age: 76
End: 2021-04-06
Payer: MEDICARE

## 2021-04-06 ENCOUNTER — TRANSCRIBE ORDERS (OUTPATIENT)
Dept: ADMINISTRATIVE | Facility: HOSPITAL | Age: 76
End: 2021-04-06

## 2021-04-06 VITALS
WEIGHT: 185 LBS | SYSTOLIC BLOOD PRESSURE: 120 MMHG | BODY MASS INDEX: 21.94 KG/M2 | HEART RATE: 72 BPM | TEMPERATURE: 96.8 F | RESPIRATION RATE: 18 BRPM | DIASTOLIC BLOOD PRESSURE: 80 MMHG

## 2021-04-06 DIAGNOSIS — E88.01 AAT (ALPHA-1-ANTITRYPSIN) DEFICIENCY (HCC): ICD-10-CM

## 2021-04-06 DIAGNOSIS — G47.00 INSOMNIA, UNSPECIFIED TYPE: ICD-10-CM

## 2021-04-06 DIAGNOSIS — R26.81 UNSTEADY GAIT: Chronic | ICD-10-CM

## 2021-04-06 DIAGNOSIS — I27.20 PULMONARY HYPERTENSION (HCC): ICD-10-CM

## 2021-04-06 DIAGNOSIS — I27.82 OTHER CHRONIC PULMONARY EMBOLISM WITHOUT ACUTE COR PULMONALE (HCC): ICD-10-CM

## 2021-04-06 DIAGNOSIS — R63.0 LOSS OF APPETITE: ICD-10-CM

## 2021-04-06 DIAGNOSIS — J96.11 CHRONIC RESPIRATORY FAILURE WITH HYPOXIA (HCC): ICD-10-CM

## 2021-04-06 DIAGNOSIS — R45.89 DYSPHORIC MOOD: ICD-10-CM

## 2021-04-06 DIAGNOSIS — R63.4 WEIGHT LOSS, UNINTENTIONAL: ICD-10-CM

## 2021-04-06 DIAGNOSIS — J43.2 CENTRILOBULAR EMPHYSEMA (HCC): Primary | ICD-10-CM

## 2021-04-06 DIAGNOSIS — F41.9 ANXIOUSNESS: ICD-10-CM

## 2021-04-06 DIAGNOSIS — I87.2 CHRONIC VENOUS INSUFFICIENCY: ICD-10-CM

## 2021-04-06 DIAGNOSIS — Z51.5 PALLIATIVE CARE PATIENT: ICD-10-CM

## 2021-04-06 DIAGNOSIS — K21.9 GASTROESOPHAGEAL REFLUX DISEASE, UNSPECIFIED WHETHER ESOPHAGITIS PRESENT: ICD-10-CM

## 2021-04-06 PROCEDURE — 99215 OFFICE O/P EST HI 40 MIN: CPT | Performed by: INTERNAL MEDICINE

## 2021-04-06 RX ORDER — TRAZODONE HYDROCHLORIDE 150 MG/1
150 TABLET ORAL
Qty: 30 TABLET | Refills: 2 | Status: SHIPPED | OUTPATIENT
Start: 2021-04-06 | End: 2021-06-08 | Stop reason: SDUPTHER

## 2021-04-06 RX ORDER — OLANZAPINE 7.5 MG/1
7.5 TABLET ORAL
Qty: 30 TABLET | Refills: 2 | Status: SHIPPED | OUTPATIENT
Start: 2021-04-06 | End: 2021-06-08 | Stop reason: SDUPTHER

## 2021-04-06 NOTE — PATIENT INSTRUCTIONS
It was a pleasure to see you again today  Thank you for coming in     · I'm concerned about recent weight loss:  · Please drink 2 or more Ensure per day  · Focus on high-protein, calorie-dense meals  Small, frequent meals w/ frequent snacking  · Take a look at Supersolid (www momsmeals  com) or other meal delivery service  Discuss this with Cas Frederick  · Change olanzapine to a 7 5mg tablet as it can be touch to cut the other tablets in half  · Increase trazodone to 150mg at bedtime  · You can use over-the-counter Imodium as needed for diarrhea, if Questran is not working  · Please let us know if you change your mind about completing advanced directives such as a living will  · Continue to work w/ home PT and OT  · Return in about 8 weeks  · Call us for refills on medications that we supply, as needed  · If something changes and you need to come in sooner, please call our office  PRESCRIPTION REFILL REMINDER:  All medication refills should be requested prior to [facility] on Friday  Any refill requests after noon on Friday would be addressed the following Monday  Please protect yourself from the novel Coronavirus (COVID-19)! The numbers of cases of Coronavirus are spiking in [address]  This is not a more dangerous virus, but a sign that more people in a community are spreading the virus  Please check the local disease reports near you if you consider travelling this summer  We do not advise travel to any community or State with a rising viral caseload   Wash your hands! Soap and water, or hand  with at least 60% alcohol, are both effective at killing the virus   Wear a mask! This will help protect others from any virus particles you might spread  Your mouth and nose BOTH need to be covered   Keep the distance! Keep 6 feet of distance from others people, even if they seem healthy  Keeping distance protects you from the other person's virus spread       I'm glad you completed your vaccine series!

## 2021-04-06 NOTE — PROGRESS NOTES
Met with patient during his PCP visit  Met is managing at home  Daughter continues to do grocery shopping  Consuming mostly microwaveable  meals  Pt states he does not cook  Discussed sodium content of these foods  Encouraged to chose lower sodium options  Briefly discussed assisted living as an option  Pt states he does not have the funds for assisted living  Reviewed discharge instructions with patient  Reviewed patients application for [place] Handicap Placard  Pt needs to have application notarized  Instructed patient what to do to complete application  Assisted patient to his car  CM will follow up with patient as needed

## 2021-04-25 DIAGNOSIS — J44.1 COPD EXACERBATION (HCC): ICD-10-CM

## 2021-04-25 DIAGNOSIS — F41.9 ANXIETY: ICD-10-CM

## 2021-04-26 RX ORDER — IPRATROPIUM BROMIDE AND ALBUTEROL SULFATE 2.5; .5 MG/3ML; MG/3ML
SOLUTION RESPIRATORY (INHALATION)
Qty: 360 ML | Refills: 0 | Status: SHIPPED | OUTPATIENT
Start: 2021-04-26 | End: 2021-07-16

## 2021-04-26 RX ORDER — LORAZEPAM 1 MG/1
TABLET ORAL
Qty: 60 TABLET | Refills: 0 | Status: SHIPPED | OUTPATIENT
Start: 2021-04-26 | End: 2021-04-29 | Stop reason: SDUPTHER

## 2021-04-28 ENCOUNTER — PATIENT OUTREACH (OUTPATIENT)
Dept: FAMILY MEDICINE CLINIC | Facility: CLINIC | Age: 76
End: 2021-04-28

## 2021-04-28 DIAGNOSIS — F41.9 ANXIETY: ICD-10-CM

## 2021-04-29 DIAGNOSIS — F41.9 ANXIETY: ICD-10-CM

## 2021-04-29 RX ORDER — LORAZEPAM 1 MG/1
TABLET ORAL
Qty: 60 TABLET | Refills: 0 | OUTPATIENT
Start: 2021-04-29

## 2021-04-29 RX ORDER — LORAZEPAM 1 MG/1
TABLET ORAL
Qty: 60 TABLET | Refills: 0 | Status: SHIPPED | OUTPATIENT
Start: 2021-04-29 | End: 2021-06-03

## 2021-04-29 NOTE — PROGRESS NOTES
Received phone call from [facility] OT from [facility]  Reports that patient fell 20 minutes prior to her arrival  No reported injuries  Pt was reported to be very weak  Refused to go to ED for evaluation  Outreach to patients daughter Reinier Espinoza  Advised of above  Discussed patients ability to continue to live independently  Pt refuses to go to assisted living  Pt refuses all options and alternatives  Pt has one son that lives in [place] that patient sends money to, and another son that lives in [place]  Neither son provides any support  Reinier Espinoza provides all support  Encouraged Reinier sEpinoza to reach out to CM if she has any additional questions or concerns  CM contact information provided

## 2021-04-30 ENCOUNTER — TELEPHONE (OUTPATIENT)
Dept: FAMILY MEDICINE CLINIC | Facility: CLINIC | Age: 76
End: 2021-04-30

## 2021-04-30 ENCOUNTER — HOSPITAL ENCOUNTER (EMERGENCY)
Facility: HOSPITAL | Age: 76
Discharge: HOME | End: 2021-04-30
Attending: EMERGENCY MEDICINE
Payer: MEDICARE

## 2021-04-30 ENCOUNTER — APPOINTMENT (EMERGENCY)
Dept: RADIOLOGY | Facility: HOSPITAL | Age: 76
End: 2021-04-30
Attending: EMERGENCY MEDICINE
Payer: MEDICARE

## 2021-04-30 VITALS
TEMPERATURE: 98.5 F | WEIGHT: 185 LBS | DIASTOLIC BLOOD PRESSURE: 72 MMHG | OXYGEN SATURATION: 95 % | RESPIRATION RATE: 18 BRPM | BODY MASS INDEX: 21.94 KG/M2 | SYSTOLIC BLOOD PRESSURE: 141 MMHG | HEART RATE: 78 BPM

## 2021-04-30 DIAGNOSIS — M79.89 LEG SWELLING: Primary | ICD-10-CM

## 2021-04-30 PROCEDURE — 99284 EMERGENCY DEPT VISIT MOD MDM: CPT | Performed by: EMERGENCY MEDICINE

## 2021-04-30 PROCEDURE — 93970 EXTREMITY STUDY: CPT

## 2021-04-30 PROCEDURE — 99283 EMERGENCY DEPT VISIT LOW MDM: CPT

## 2021-04-30 NOTE — DISCHARGE INSTRUCTIONS
Leg Edema   AMBULATORY CARE:   Leg edema  is swelling caused by fluid buildup  Your legs may swell if you sit or stand for long periods of time, are pregnant, or are injured  Swelling may also occur if you have heart failure or circulation problems  This means that your heart does not pump blood through your body as it should  Call your local emergency number (911 in the [address]) for any of the following:   · You cannot walk  · You have chest pain or trouble breathing that is worse when you lie down  · You suddenly feel lightheaded and have trouble breathing  · You have new and sudden chest pain  You may have more pain when you take deep breaths or cough  · You cough up blood  Seek care immediately if:   · You feel faint or confused  · Your skin turns blue or gray  · Your leg feels warm, tender, and painful  It may be swollen and red  Call your doctor if:   · You have a fever or feel more tired than usual     · The veins in your legs look larger than usual  They may look full or bulging  · Your legs itch or feel heavy  · You have red or white areas or sores on your legs  The skin may also appear dimpled or have indentations  · You are gaining weight  · You have trouble moving your ankles  · The swelling does not go away, or other parts of your body swell  · You have questions or concerns about your condition or care  Self-care:   · Elevate your legs  Raise your legs above the level of your heart as often as you can  This will help decrease swelling and pain  Prop your legs on pillows or blankets to keep them elevated comfortably  · Wear pressure stockings, if directed  These tight stockings put pressure on your legs to promote blood flow and prevent blood clots  Put them on before you get out of bed  Wear the stockings during the day  Do not wear them while you sleep  · Stay active  Do not stand or sit for long periods of time   Ask your healthcare provider about the best exercise plan for you  · Eat healthy foods  Healthy foods include fruits, vegetables, whole-grain breads, low-fat dairy products, beans, lean meats, and fish  Ask if you need to be on a special diet  · Limit sodium (salt)  Salt will make your body hold even more fluid  Your healthcare provider will tell you how many milligrams (mg) of salt you can have each day  Follow up with your doctor as directed:  Write down your questions so you remember to ask them during your visits  © Copyright [address] [place] [address] Drive Information is for End User's use only and may not be sold, redistributed or otherwise used for commercial purposes  All illustrations and images included in CareNotes® are the copyrighted property of [employer]  or [address] [place], [place] [address]   The above information is an  only  It is not intended as medical advice for individual conditions or treatments  Talk to your doctor, nurse or pharmacist before following any medical regimen to see if it is safe and effective for you

## 2021-04-30 NOTE — TELEPHONE ENCOUNTER
Pt has increased right leg swelling per PT, could not feel pedal pulse  h/o blood clots 4 years ago - not on blood thinners currently  Patient had a fall the other day at home and neighbors had to help him up

## 2021-04-30 NOTE — ED PROVIDER NOTES
History  Chief Complaint   Patient presents with    Leg Swelling     Pt c/o r leg swelling, been going to physical therapy  Pt in the ER with c/o R> L LE edema x 3 days  He was sent by his PCP for an emergent vascular study  He denies recent trauma  Pt has been getting PT for the affected leg  Pt is chronically dependent on 3L NC due to a hx of emphysema  He denies worsening dyspnea, chest pain, cough  History provided by:  Patient   used: No    Leg Pain  Location:  Leg  Leg location:  R leg and R lower leg  Pain details:     Quality:  Aching    Radiates to:  Does not radiate    Severity:  Mild    Onset quality:  Sudden    Timing:  Constant    Progression:  Worsening  Chronicity:  New  Dislocation: no    Foreign body present:  No foreign bodies  Tetanus status:  Unknown  Prior injury to area:  No  Relieved by:  None tried  Worsened by:  Nothing  Ineffective treatments:  None tried  Associated symptoms: no back pain and no fever        Prior to Admission Medications   Prescriptions Last Dose Informant Patient Reported? Taking? Diclofenac Sodium (VOLTAREN) 1 %   No No   Sig: Apply 2 g topically 4 (four) times a day Apply to R knee   LORazepam (ATIVAN) 1 mg tablet   No No   Si tablet twice daily for anxiety   OLANZapine (ZyPREXA) 7 5 mg tablet   No No   Sig: Take 1 tablet (7 5 mg total) by mouth daily at bedtime   OXYGEN-HELIUM IN  Self Yes No   Sig: Inhale 2L at rest- 3L with activity   Ventolin  (90 Base) MCG/ACT inhaler  Self No No   Sig: Inhale 1 puff every 6 (six) hours as needed for shortness of breath   ZEMAIRA infusion  Self Yes No   Sig: once a week    acetaminophen (TYLENOL) 500 mg tablet   No No   Sig: Take 2 tablets (1,000 mg total) by mouth every 8 (eight) hours   Patient not taking: Reported on 3/3/2021   budesonide (PULMICORT) 0 5 mg/2 mL nebulizer solution  Self No No   Sig: Take 1 vial (0 5 mg total) by nebulization 2 (two) times a day Rinse mouth after use  busPIRone (BUSPAR) 10 mg tablet  Self No No   Sig: Take 1 tablet (10 mg total) by mouth 3 (three) times a day   cholestyramine sugar free (QUESTRAN LIGHT) 4 g packet   No No   Sig: Take 1 packet (4 g total) by mouth 2 (two) times a day   doxylamine (Unisom SleepTabs) 25 MG tablet  Self Yes No   Sig: Take 25 mg by mouth daily at bedtime as needed for sleep   famotidine (PEPCID) 20 mg tablet  Self No No   Sig: Take 1 tablet (20 mg total) by mouth daily   Patient not taking: Reported on 3/3/2021   finasteride (PROSCAR) 5 mg tablet  Self No No   Sig: Take 1 tablet (5 mg total) by mouth every morning   Patient not taking: Reported on 3/3/2021   fluticasone (FLONASE) 50 mcg/act nasal spray  Self No No   Si sprays into each nostril daily   formoterol (Perforomist) 20 MCG/2ML nebulizer solution  Self No No   Sig: Take 1 vial (20 mcg total) by nebulization 2 (two) times a day   gabapentin (NEURONTIN) 300 mg capsule  Self No No   Sig: Take 1 capsule (300 mg total) by mouth 3 (three) times a day   guaiFENesin (ROBITUSSIN) 100 MG/5ML oral liquid  Self Yes No   Sig: Take 200 mg by mouth 2 (two) times a day   ipratropium-albuterol (DUO-NEB) 0 5-2 5 mg/3 mL nebulizer solution   No No   Sig: inhale contents of 1 vial via nebulizer 4 times a day as needed   midodrine (PROAMATINE) 2 5 mg tablet  Self No No   Sig: Take 1 tablet (2 5 mg total) by mouth 3 (three) times a day   pantoprazole (PROTONIX) 40 mg tablet  Self No No   Sig: Take 1 tablet (40 mg total) by mouth 2 (two) times a day   tamsulosin (FLOMAX) 0 4 mg  Self No No   Sig: Half a tablet daily   Patient not taking: Reported on 3/3/2021   tamsulosin (FLOMAX) 0 4 mg  Self Yes No   Sig: Take 0 4 mg by mouth daily   traZODone (DESYREL) 150 mg tablet   No No   Sig: Take 1 tablet (150 mg total) by mouth daily at bedtime      Facility-Administered Medications: None       Past Medical History:   Diagnosis Date    Anesthesia complication     Difficult to wake up    Arthritis  BPH (benign prostatic hyperplasia)     urinary frequency    Cancer (HCC)     basal cell neck, face    COPD (chronic obstructive pulmonary disease) (HCC)     COPD exacerbation ([facility] [address] ) 12/29/2019    Full dentures     Hiatal hernia     History of methicillin resistant staphylococcus aureus (MRSA)     10/11/2018 MRSA (nares) positive    Hypertension     Irritable bowel syndrome     Kidney stone     at least 7 episodes    Liver disease     Alpha 1- enzyme deficiency - diagnosed 2002  has been on weekly replacement therapy since then    Pulmonary emphysema ([facility] [address] )     1/25/15  FEV1 - 2 45 liters or 59% of predicted    RSV infection 12/2017    Wears glasses     for driving only       Past Surgical History:   Procedure Laterality Date    BACK SURGERY  2008    discectomy    COLONOSCOPY      COLONOSCOPY N/A 3/10/2017    Procedure: Lutricia Edu;  Surgeon: Huma Lawson MD;  Location: [place] [address] GI LAB; Service:    Paris Collado      removal of kidney stones    ESOPHAGOGASTRODUODENOSCOPY N/A 3/10/2017    Procedure: ESOPHAGOGASTRODUODENOSCOPY (EGD); Surgeon: Huma Lawson MD;  Location: [facility] GI LAB; Service:     LITHOTRIPSY      AR ESOPHAGOGASTRODUODENOSCOPY TRANSORAL DIAGNOSTIC N/A 11/20/2018    Procedure: ESOPHAGOGASTRODUODENOSCOPY (EGD); Surgeon: Huma Lawson MD;  Location: [facility] GI LAB; Service: Gastroenterology    TONSILLECTOMY      VEIN LIGATION AND STRIPPING Bilateral     18's       Family History   Problem Relation Age of Onset    Emphysema Mother         never smoked    Emphysema Father     Cancer Brother         colon    Colon cancer Brother     Ulcerative colitis Family     Liver disease Family      I have reviewed and agree with the history as documented      E-Cigarette/Vaping    E-Cigarette Use Never User      E-Cigarette/Vaping Substances    Nicotine No     THC No     CBD No     Flavoring No     Other No     Unknown No      Social History     Tobacco Use    Smoking status: Former Smoker     Packs/day: 1 00     Years: 60 00     Pack years: 60 00     Quit date: 8/31/2017     Years since quitting: 3 6    Smokeless tobacco: Never Used    Tobacco comment: quit in august 2017   Substance Use Topics    Alcohol use: Not Currently     Frequency: Never     Comment: stopped in 2009    Drug use: Not Currently       Review of Systems   Constitutional: Negative for chills and fever  Respiratory: Negative for cough, shortness of breath and wheezing  Cardiovascular: Negative for chest pain and palpitations  Gastrointestinal: Negative for abdominal pain, constipation, diarrhea, nausea and vomiting  Genitourinary: Negative for dysuria, flank pain, hematuria and urgency  Musculoskeletal: Positive for gait problem  Negative for back pain  Skin: Negative for color change and rash  All other systems reviewed and are negative  Physical Exam  Physical Exam  Vitals signs and nursing note reviewed  Constitutional:       Appearance: He is well-developed  HENT:      Head: Normocephalic and atraumatic  Eyes:      Pupils: Pupils are equal, round, and reactive to light  Cardiovascular:      Rate and Rhythm: Normal rate and regular rhythm  Heart sounds: Normal heart sounds  Pulmonary:      Effort: Pulmonary effort is normal       Breath sounds: Normal breath sounds  Abdominal:      General: Bowel sounds are normal  There is no distension  Palpations: Abdomen is soft  There is no mass  Tenderness: There is no abdominal tenderness  There is no guarding or rebound  Musculoskeletal:         General: No swelling, tenderness, deformity or signs of injury  Right lower leg: He exhibits swelling  Edema present  Left lower leg: Edema present  Comments: 4+ pitting edema on RLE - extending from foot to knee  2+ pitting edema on LLE - extending from foot to knee   Skin:     General: Skin is warm and dry        Capillary Refill: Capillary refill takes less than 2 seconds  Neurological:      Mental Status: He is alert and oriented to person, place, and time  Psychiatric:         Behavior: Behavior normal          Thought Content: Thought content normal          Judgment: Judgment normal          Vital Signs  ED Triage Vitals [04/30/21 1452]   Temperature Pulse Respirations Blood Pressure SpO2   98 5 °F (36 9 °C) 78 18 141/72 95 %      Temp Source Heart Rate Source Patient Position - Orthostatic VS BP Location FiO2 (%)   Tympanic Monitor Sitting Left arm --      Pain Score       --           Vitals:    04/30/21 1452   BP: 141/72   Pulse: 78   Patient Position - Orthostatic VS: Sitting         Visual Acuity      ED Medications  Medications - No data to display    Diagnostic Studies  Results Reviewed     None                 VAS lower limb venous duplex study, complete bilateral    (Results Pending)              Procedures  Procedures         ED Course                                           MDM  Number of Diagnoses or Management Options  Leg swelling: new and requires workup  Diagnosis management comments: Pt in the ER with c/o R > L LE edema  Vascular study ordered, but not completed prior to shift change  Pt signed out to oncoming physician, for dispo when study report is reviewed          Amount and/or Complexity of Data Reviewed  Tests in the radiology section of CPT®: ordered and reviewed    Risk of Complications, Morbidity, and/or Mortality  Presenting problems: high  Diagnostic procedures: high  Management options: high    Patient Progress  Patient progress: stable      Disposition  Final diagnoses:   Leg swelling     Time reflects when diagnosis was documented in both MDM as applicable and the Disposition within this note     Time User Action Codes Description Comment    4/30/2021  5:35 PM Magnolia Cruz Add [X33 [id number]] Leg swelling       ED Disposition     ED Disposition Condition Date/Time Comment    Discharge Stable Fri Apr 30, 2021  5:35 PM Fransisco Scott discharge to home/self care              Follow-up Information     Follow up With Specialties Details Why Contact Info    Albin Watkins MD Family Medicine In 1 week  [id number]  [place] [id number]  [phone]            Discharge Medication List as of 4/30/2021  5:35 PM      CONTINUE these medications which have NOT CHANGED    Details   acetaminophen (TYLENOL) 500 mg tablet Take 2 tablets (1,000 mg total) by mouth every 8 (eight) hours, Starting Tue 2/9/2021, Normal      budesonide (PULMICORT) 0 5 mg/2 mL nebulizer solution Take 1 vial (0 5 mg total) by nebulization 2 (two) times a day Rinse mouth after use , Starting Fri 12/18/2020, Normal      busPIRone (BUSPAR) 10 mg tablet Take 1 tablet (10 mg total) by mouth 3 (three) times a day, Starting Mon 1/4/2021, Normal      cholestyramine sugar free (QUESTRAN LIGHT) 4 g packet Take 1 packet (4 g total) by mouth 2 (two) times a day, Starting Tue 2/9/2021, Normal      Diclofenac Sodium (VOLTAREN) 1 % Apply 2 g topically 4 (four) times a day Apply to R knee, Starting Tue 2/9/2021, Normal      doxylamine (Unisom SleepTabs) 25 MG tablet Take 25 mg by mouth daily at bedtime as needed for sleep, Historical Med      famotidine (PEPCID) 20 mg tablet Take 1 tablet (20 mg total) by mouth daily, Starting Tue 8/4/2020, Normal      finasteride (PROSCAR) 5 mg tablet Take 1 tablet (5 mg total) by mouth every morning, Starting Thu 9/24/2020, Normal      fluticasone (FLONASE) 50 mcg/act nasal spray 2 sprays into each nostril daily, Starting Mon 7/6/2020, Normal      formoterol (Perforomist) 20 MCG/2ML nebulizer solution Take 1 vial (20 mcg total) by nebulization 2 (two) times a day, Starting Fri 12/18/2020, Normal      gabapentin (NEURONTIN) 300 mg capsule Take 1 capsule (300 mg total) by mouth 3 (three) times a day, Starting Thu 10/15/2020, Normal      guaiFENesin (ROBITUSSIN) 100 MG/5ML oral liquid Take 200 mg by mouth 2 (two) times a day, Historical Med ipratropium-albuterol (DUO-NEB) 0 5-2 5 mg/3 mL nebulizer solution inhale contents of 1 vial via nebulizer 4 times a day as needed, Normal      LORazepam (ATIVAN) 1 mg tablet 1 tablet twice daily for anxiety, Normal      midodrine (PROAMATINE) 2 5 mg tablet Take 1 tablet (2 5 mg total) by mouth 3 (three) times a day, Starting Mon 1/4/2021, Normal      OLANZapine (ZyPREXA) 7 5 mg tablet Take 1 tablet (7 5 mg total) by mouth daily at bedtime, Starting Tue 4/6/2021, Normal      OXYGEN-HELIUM IN Inhale 2L at rest- 3L with activity, Historical Med      pantoprazole (PROTONIX) 40 mg tablet Take 1 tablet (40 mg total) by mouth 2 (two) times a day, Starting Wed 11/4/2020, Normal      !! tamsulosin (FLOMAX) 0 4 mg Half a tablet daily, Normal      !! tamsulosin (FLOMAX) 0 4 mg Take 0 4 mg by mouth daily, Historical Med      traZODone (DESYREL) 150 mg tablet Take 1 tablet (150 mg total) by mouth daily at bedtime, Starting Tue 4/6/2021, Normal      Ventolin  (90 Base) MCG/ACT inhaler Inhale 1 puff every 6 (six) hours as needed for shortness of breath, Starting Thu 9/24/2020, Normal      ZEMAIRA infusion once a week , Starting Thu 12/26/2019, Historical Med       !! - Potential duplicate medications found  Please discuss with provider  No discharge procedures on file      PDMP Review       Value Time User    PDMP Reviewed  Yes 4/6/2021  9:22 AM Erika Connell MD          ED Provider  Electronically Signed by           William Carmichael DO  04/30/21 5396

## 2021-05-04 ENCOUNTER — TELEPHONE (OUTPATIENT)
Dept: FAMILY MEDICINE CLINIC | Facility: CLINIC | Age: 76
End: 2021-05-04

## 2021-05-04 NOTE — TELEPHONE ENCOUNTER
[address] calling to report a fall that Patient had on 5/3/2020, No injuries or busing  Reports Patients legs getting weak and falling to floor   Patient was able to get up on his own

## 2021-05-05 PROCEDURE — 93970 EXTREMITY STUDY: CPT | Performed by: SURGERY

## 2021-05-10 ENCOUNTER — PATIENT OUTREACH (OUTPATIENT)
Dept: FAMILY MEDICINE CLINIC | Facility: CLINIC | Age: 76
End: 2021-05-10

## 2021-05-10 NOTE — PROGRESS NOTES
Received phone call from [facility] Nurse from [facility]fusion Nurse [facility]ist  Nurse provides patient with his alpha one medications/infusions  Kenneth is calling to report that patients leg is now swollen up to his groin  Painful to touch  Pt winces when leg is touched  Pt fell 15 minutes prior to Kenneth's arrival  Pt is using walker to get around    Pt is reported to be weak with as shuffling gait  Discussed with Dr Elyse Rosales  Suggests that patient go to ED for xray  Outreach to patient   States he has only fallen on his left hip which he reports to be fine  Advised Dr Keri Law  Pt will be seen at his scheduled appointment this week  Outreach to patient  Advised of his upcoming appointment  Advised to call [facility] if any changes or worsening of condition  Verbalizes understanding of above

## 2021-05-13 ENCOUNTER — TELEPHONE (OUTPATIENT)
Dept: FAMILY MEDICINE CLINIC | Facility: CLINIC | Age: 76
End: 2021-05-13

## 2021-05-13 ENCOUNTER — OFFICE VISIT (OUTPATIENT)
Dept: FAMILY MEDICINE CLINIC | Facility: CLINIC | Age: 76
End: 2021-05-13
Payer: MEDICARE

## 2021-05-13 VITALS
BODY MASS INDEX: 21.7 KG/M2 | DIASTOLIC BLOOD PRESSURE: 72 MMHG | TEMPERATURE: 97.8 F | HEART RATE: 79 BPM | RESPIRATION RATE: 18 BRPM | HEIGHT: 77 IN | WEIGHT: 183.8 LBS | SYSTOLIC BLOOD PRESSURE: 138 MMHG | OXYGEN SATURATION: 97 %

## 2021-05-13 DIAGNOSIS — E88.01 AAT (ALPHA-1-ANTITRYPSIN) DEFICIENCY (HCC): ICD-10-CM

## 2021-05-13 DIAGNOSIS — E55.9 VITAMIN D DEFICIENCY DISEASE: ICD-10-CM

## 2021-05-13 DIAGNOSIS — R60.0 LOCALIZED EDEMA: Primary | ICD-10-CM

## 2021-05-13 DIAGNOSIS — I83.029 VENOUS ULCER OF LEFT LOWER EXTREMITY WITH VARICOSE VEINS (HCC): ICD-10-CM

## 2021-05-13 DIAGNOSIS — I27.20 PULMONARY HYPERTENSION (HCC): ICD-10-CM

## 2021-05-13 DIAGNOSIS — L97.929 VENOUS ULCER OF LEFT LOWER EXTREMITY WITH VARICOSE VEINS (HCC): ICD-10-CM

## 2021-05-13 PROCEDURE — 99214 OFFICE O/P EST MOD 30 MIN: CPT | Performed by: FAMILY MEDICINE

## 2021-05-13 RX ORDER — AMLODIPINE BESYLATE 10 MG/1
TABLET ORAL
COMMUNITY
Start: 2021-05-03 | End: 2021-08-02

## 2021-05-13 RX ORDER — TRIAMTERENE AND HYDROCHLOROTHIAZIDE 37.5; 25 MG/1; MG/1
1 CAPSULE ORAL EVERY MORNING
Qty: 30 CAPSULE | Refills: 0 | Status: SHIPPED | OUTPATIENT
Start: 2021-05-13 | End: 2021-12-02 | Stop reason: ALTCHOICE

## 2021-05-13 NOTE — PATIENT INSTRUCTIONS
Low salt diet   Compression elevation LE high protein diet   Follow up Pulmonary Palliative care     Vit D 5,000 units daily

## 2021-05-13 NOTE — PROGRESS NOTES
Subjective:           Problem List Items Addressed This Visit        Digestive    AAT (alpha-1-antitrypsin) deficiency ([place] [address] ) stable cont tx O2       Cardiovascular and Mediastinum    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 (HCC) elevate compress high protein low salt    Pulmonary hypertension (HCC) stable f/u Pulm       Other    Vitamin D deficiency disease cont tx      Other Visit Diagnoses     Localized edema    -  Primary    Relevant Medications    triamterene-hydrochlorothiazide (DYAZIDE) 37 5-25 mg per capsule              No orders of the defined types were placed in this encounter  Patient Instructions   Low salt diet   Compression elevation LE high protein diet  Follow up Pulmonary Palliative care    BMI Counseling: Body mass index is 21 8 kg/m²  Discussed the patient's BMI with him  The [address] [place] [address]    Chief Complaint   Patient presents with    Leg Swelling     Follow up  Says he's feeling lightheaded today more than usual  Mehnaz Padilla is not helping today  HPI   Trey Marie is in for evaluation follow-up right leg swelling worse than left had ED visit clock ruled out  He has CKD alpha-1 antitrypsin deficiency BPH COPD  Pulm HTN edema with PVD ulcer L  He has very low vitamin-D levels he follows with palliative care   /72 (BP Location: Left arm, Patient Position: Sitting, Cuff Size: Standard)   Pulse 79   Temp 97 8 °F (36 6 °C) (Tympanic)   Resp 18   Ht 6' 5" (1 956 m)   Wt 83 4 kg (183 lb 12 8 oz)   SpO2 97%   BMI 21 80 kg/m²       Allergies   Allergen Reactions    Chantix [Varenicline]      Caused prostate infection       Current Outpatient Medications on File Prior to Visit   Medication Sig Dispense Refill    amLODIPine (NORVASC) 10 mg tablet       budesonide (PULMICORT) 0 5 mg/2 mL nebulizer solution Take 1 vial (0 5 mg total) by nebulization 2 (two) times a day Rinse mouth after use   60 vial 11    busPIRone (BUSPAR) 10 mg tablet Take 1 tablet (10 mg total) by mouth 3 (three) times a day 90 tablet 2    cholestyramine sugar free (QUESTRAN LIGHT) 4 g packet Take 1 packet (4 g total) by mouth 2 (two) times a day 60 packet 1    Diclofenac Sodium (VOLTAREN) 1 % Apply 2 g topically 4 (four) times a day Apply to R knee 50 g 0    doxylamine (Unisom SleepTabs) 25 MG tablet Take 25 mg by mouth daily at bedtime as needed for sleep      fluticasone (FLONASE) 50 mcg/act nasal spray 2 sprays into each nostril daily 16 g 5    formoterol (Perforomist) 20 MCG/2ML nebulizer solution Take 1 vial (20 mcg total) by nebulization 2 (two) times a day 60 vial 11    gabapentin (NEURONTIN) 300 mg capsule Take 1 capsule (300 mg total) by mouth 3 (three) times a day 90 capsule 6    guaiFENesin (ROBITUSSIN) 100 MG/5ML oral liquid Take 200 mg by mouth 2 (two) times a day      ipratropium-albuterol (DUO-NEB) 0 5-2 5 mg/3 mL nebulizer solution inhale contents of 1 vial via nebulizer 4 times a day as needed 360 mL 0    LORazepam (ATIVAN) 1 mg tablet 1 tablet twice daily for anxiety 60 tablet 0    midodrine (PROAMATINE) 2 5 mg tablet Take 1 tablet (2 5 mg total) by mouth 3 (three) times a day 90 tablet 11    OLANZapine (ZyPREXA) 7 5 mg tablet Take 1 tablet (7 5 mg total) by mouth daily at bedtime 30 tablet 2    OXYGEN-HELIUM IN Inhale 2L at rest- 3L with activity      pantoprazole (PROTONIX) 40 mg tablet Take 1 tablet (40 mg total) by mouth 2 (two) times a day 180 tablet 3    tamsulosin (FLOMAX) 0 4 mg Take 0 4 mg by mouth daily      traZODone (DESYREL) 150 mg tablet Take 1 tablet (150 mg total) by mouth daily at bedtime 30 tablet 2    Ventolin  (90 Base) MCG/ACT inhaler Inhale 1 puff every 6 (six) hours as needed for shortness of breath 1 Inhaler 11    acetaminophen (TYLENOL) 500 mg tablet Take 2 tablets (1,000 mg total) by mouth every 8 (eight) hours (Patient not taking: Reported on 3/3/2021) 90 tablet 3    famotidine (PEPCID) 20 mg tablet Take 1 tablet (20 mg total) by mouth daily (Patient not taking: Reported on 3/3/2021) 30 tablet 3    finasteride (PROSCAR) 5 mg tablet Take 1 tablet (5 mg total) by mouth every morning (Patient not taking: Reported on 3/3/2021) 90 tablet 3    tamsulosin (FLOMAX) 0 4 mg Half a tablet daily (Patient not taking: Reported on 3/3/2021) 30 capsule 5    ZEMAIRA infusion once a week       [DISCONTINUED] busPIRone (BUSPAR) 10 mg tablet TAKE ONE TABLET BY MOUTH THREE TIMES DAILY  90 tablet 0     No current facility-administered medications on file prior to visit  Past Medical History:   Diagnosis Date    Anesthesia complication     Difficult to wake up    Arthritis     BPH (benign prostatic hyperplasia)     urinary frequency    Cancer (HCC)     basal cell neck, face    COPD (chronic obstructive pulmonary disease) (HCC)     COPD exacerbation ([facility] [address] ) 12/29/2019    Full dentures     Hiatal hernia     History of methicillin resistant staphylococcus aureus (MRSA)     10/11/2018 MRSA (nares) positive    Hypertension     Irritable bowel syndrome     Kidney stone     at least 7 episodes    Liver disease     Alpha 1- enzyme deficiency - diagnosed 2002  has been on weekly replacement therapy since then    Pulmonary emphysema ([facility] [address] )     1/25/15  FEV1 - 2 45 liters or 59% of predicted    RSV infection 12/2017    Wears glasses     for driving only       Past Surgical History:   Procedure Laterality Date    BACK SURGERY  2008    discectomy    COLONOSCOPY      COLONOSCOPY N/A 3/10/2017    Procedure: Jamarcus Archuleta;  Surgeon: Brandee Pearson MD;  Location: [facility] GI LAB; Service:    Kasandra Pals      removal of kidney stones    ESOPHAGOGASTRODUODENOSCOPY N/A 3/10/2017    Procedure: ESOPHAGOGASTRODUODENOSCOPY (EGD); Surgeon: Brandee Pearson MD;  Location: [facility] GI LAB; Service:     LITHOTRIPSY      [place] ESOPHAGOGASTRODUODENOSCOPY TRANSORAL DIAGNOSTIC N/A 11/20/2018    Procedure: ESOPHAGOGASTRODUODENOSCOPY (EGD);   Surgeon: Brandee Pearson MD;  Location: [place] [address] GI LAB; Service: Gastroenterology    TONSILLECTOMY      VEIN LIGATION AND STRIPPING Bilateral     1980's          reports that he quit smoking about 3 years ago  He has a 60 00 pack-year smoking history  He has never used smokeless tobacco  He reports previous alcohol use  He reports previous drug use  reports that he quit smoking about 3 years ago  He has a 60 00 pack-year smoking history  He has never used smokeless tobacco         Review of Systems   Constitutional: Negative for fatigue and fever  HENT: Positive for dental problem  Negative for congestion, ear discharge, ear pain, facial swelling, mouth sores, nosebleeds, postnasal drip, trouble swallowing and voice change  Edentulous   Eyes: Positive for visual disturbance  Left   Respiratory: Positive for shortness of breath  Negative for cough and wheezing  On 02 3l   Cardiovascular: Negative for chest pain, palpitations and leg swelling  Gastrointestinal: Negative for abdominal distention and blood in stool  Genitourinary: Negative for flank pain, hematuria and penile pain  Musculoskeletal: Positive for arthralgias, gait problem and myalgias  Cane weakness LE PT at home     Edema R LE   Skin: Negative for pallor  Neurological: Positive for weakness  Negative for seizures, facial asymmetry, speech difficulty and light-headedness  Hematological: Negative for adenopathy  Bruises/bleeds easily  Psychiatric/Behavioral: Negative for behavioral problems and decreased concentration  The patient is not nervous/anxious  Physical Exam  Vitals signs and nursing note reviewed  Constitutional:       General: He is not in acute distress  Appearance: He is well-developed  He is ill-appearing  He is not toxic-appearing  Comments: Frail    o2 3l NC   HENT:      Head: Normocephalic and atraumatic        Right Ear: External ear normal       Left Ear: External ear normal       Mouth/Throat: Mouth: Mucous membranes are moist       Pharynx: No oropharyngeal exudate  Eyes:      General: No scleral icterus  Right eye: No discharge  Left eye: No discharge  Conjunctiva/sclera: Conjunctivae normal       Pupils: Pupils are equal, round, and reactive to light  Comments: L catarct   Neck:      Musculoskeletal: Normal range of motion and neck supple  Trachea: No tracheal deviation  Cardiovascular:      Rate and Rhythm: Normal rate and regular rhythm  Heart sounds: No murmur  No friction rub  No gallop  Pulmonary:      Effort: Pulmonary effort is normal  No respiratory distress  Breath sounds: No stridor  No wheezing or rales  Comments: Distant exp wheeze on o2 @3 l nc  Abdominal:      General: Bowel sounds are normal  There is no distension  Palpations: Abdomen is soft  Tenderness: There is no guarding or rebound  Musculoskeletal:         General: No deformity  Right lower leg: Edema present  Lymphadenopathy:      Cervical: No cervical adenopathy  Skin:     General: Skin is warm and dry  Capillary Refill: Capillary refill takes 2 to 3 seconds  Findings: No rash  Comments: AK   nose hands   Neurological:      General: No focal deficit present  Mental Status: He is alert and oriented to person, place, and time  Mental status is at baseline  Cranial Nerves: No cranial nerve deficit  Motor: Weakness present  No abnormal muscle tone  Coordination: Coordination normal       Gait: Gait abnormal       Comments: cane   Psychiatric:         Behavior: Behavior normal          Thought Content:  Thought content normal          Judgment: Judgment normal

## 2021-05-13 NOTE — TELEPHONE ENCOUNTER
Patient is calling in as he is unsure of how much Vitamin D he should be taking  States it was increased during today's visit, but does not remember the dosage

## 2021-05-18 ENCOUNTER — TELEPHONE (OUTPATIENT)
Dept: FAMILY MEDICINE CLINIC | Facility: CLINIC | Age: 76
End: 2021-05-18

## 2021-05-18 DIAGNOSIS — R26.2 AMBULATORY DYSFUNCTION: Primary | ICD-10-CM

## 2021-05-26 ENCOUNTER — OFFICE VISIT (OUTPATIENT)
Dept: PULMONOLOGY | Facility: MEDICAL CENTER | Age: 76
End: 2021-05-26
Payer: MEDICARE

## 2021-05-26 VITALS
BODY MASS INDEX: 21.8 KG/M2 | SYSTOLIC BLOOD PRESSURE: 142 MMHG | TEMPERATURE: 97.3 F | DIASTOLIC BLOOD PRESSURE: 82 MMHG | HEIGHT: 77 IN | OXYGEN SATURATION: 95 % | RESPIRATION RATE: 12 BRPM | HEART RATE: 122 BPM

## 2021-05-26 DIAGNOSIS — J43.2 CENTRILOBULAR EMPHYSEMA (HCC): ICD-10-CM

## 2021-05-26 DIAGNOSIS — E88.01 AAT (ALPHA-1-ANTITRYPSIN) DEFICIENCY (HCC): Primary | Chronic | ICD-10-CM

## 2021-05-26 DIAGNOSIS — J96.11 CHRONIC RESPIRATORY FAILURE WITH HYPOXIA (HCC): Chronic | ICD-10-CM

## 2021-05-26 PROBLEM — R91.1 LUNG NODULE: Status: RESOLVED | Noted: 2018-01-31 | Resolved: 2021-05-26

## 2021-05-26 PROBLEM — R91.8 LUNG MASS: Status: RESOLVED | Noted: 2018-02-14 | Resolved: 2021-05-26

## 2021-05-26 PROCEDURE — 99214 OFFICE O/P EST MOD 30 MIN: CPT | Performed by: NURSE PRACTITIONER

## 2021-05-26 NOTE — PATIENT INSTRUCTIONS
Dyspnea   WHAT YOU NEED TO KNOW:   Dyspnea is breathing difficulty or discomfort  You may have labored, painful, or shallow breathing  You may feel breathless or short of breath  Dyspnea can occur during rest or with activity  You may have dyspnea for a short time, or it might become chronic  Dyspnea is often a symptom of a disease or condition  DISCHARGE INSTRUCTIONS:   Return to the emergency department if:   · Your signs and symptoms are the same or worse within 24 hours of treatment  · You have shaking chills or a fever over 102°F      · You have new pain, pressure, or tightness in your chest      · You have a new or worse cough or wheezing, or you cough up blood  · You feel like you cannot get enough air  · The skin over your ribs or on your neck sinks in when you breathe  · You have a severe headache with vomiting and abdominal pain  · You feel confused or dizzy  Contact your healthcare provider or specialist if:   · You have questions or concerns about your condition or care  Medicines:   · Medicines  may be used to treat the cause of your dyspnea  Medicines may reduce swelling in your airway or decrease extra fluid from around your heart or lungs  Other medicines may be used to decrease anxiety and help you feel calm and relaxed  · Take your medicine as directed  Contact your healthcare provider if you think your medicine is not helping or if you have side effects  Tell him or her if you are allergic to any medicine  Keep a list of the medicines, vitamins, and herbs you take  Include the amounts, and when and why you take them  Bring the list or the pill bottles to follow-up visits  Carry your medicine list with you in case of an emergency  Manage long-term dyspnea:   · Create an action plan  You and your healthcare provider can work together to create a plan for how to handle episodes of dyspnea   The plan can include daily activities, treatment changes, and what to do if you have severe breathing problems  · Lean forward on your elbows when you sit  This helps your lungs expand and may make it easier to breathe  · Use pursed-lip breathing any time you feel short of breath  Breathe in through your nose and then slowly breathe out through your mouth with your lips slightly puckered  It should take you twice as long to breathe out as it did to breathe in  · Do not smoke  Nicotine and other chemicals in cigarettes and cigars can cause lung damage and make it harder to breathe  Ask your healthcare provider for information if you currently smoke and need help to quit  E-cigarettes or smokeless tobacco still contain nicotine  Talk to your healthcare provider before you use these products  · Reach or maintain a healthy weight  Your healthcare provider can help you create a safe weight loss plan if you are overweight  · Exercise as directed  Exercise can help your lungs work more easily  Exercise can also help you lose weight if needed  Try to get at least 30 minutes of exercise most days of the week  Your healthcare provider can help you create an exercise plan that is safe for you  Follow up with your healthcare provider or specialist as directed:  Write down your questions so you remember to ask them during your visits  © Copyright [address] Information is for End User's use only and may not be sold, redistributed or otherwise used for commercial purposes  All illustrations and images included in CareNotes® are the copyrighted property of A D A M , Inc  or [facility] Gwen Hernandez   The above information is an  only  It is not intended as medical advice for individual conditions or treatments  Talk to your doctor, nurse or pharmacist before following any medical regimen to see if it is safe and effective for you

## 2021-05-26 NOTE — PROGRESS NOTES
Assessment/Plan:     Problem List Items Addressed This Visit        Digestive    AAT (alpha-1-antitrypsin) deficiency ([facility] ) - Primary (Chronic)     Patient has history of alpha 1 antitrypsin  He does get weekly Zemaira injections  He continues to follow-up and plan includes continuation of the same  Does go to outpatient infusion for the same            Respiratory    Chronic respiratory failure with hypoxia ([facility] ) (Chronic)     Patient continues to use an benefit from supplemental oxygen  Room air oxygen at rest was 92%  Room air oxygen on 3 L pulse was 95%  With ambulating 250 ft or 2 laps his O2 sat on 3 L was 88%  We then increase his portable concentrator to 5 L pulsed and his O2 sat with ambulation was 93% and at rest was 92%  Patient continues to use an benefit from supplemental oxygen  He is mostly housebound at this point  I did instruct him that when he leaves the home portable concentrator should be at a minimum of 5 L with ambulation  He understands the same  Centrilobular emphysema ([facility] )     Patient had his last PFT done in October 2019  At that time forced vital capacity was 3 373 L or 60% of predicted, FEV1 was 1 60 L or 40% of predicted obstruction ratio 43%  Plan includes repeat PFT when COVID-19 restrictions are listed  He continues to use via nebulizer performance 20 mcg b i d  And budesonide 0 5 b i d  He does use either rescue inhalation or also DuoNebs at least twice or 3 times daily  Patient is somewhat debilitated since last visit  He states that this is been ongoing issue since March of 2021 when he was hospitalized and then went to short-term rehabilitation  Return in about 3 months (around 8/26/2021)  All questions are answered to the patient's satisfaction and understanding  He verbalizes understanding  He is encouraged to call with any further questions or concerns  Portions of the record may have been created with voice recognition software  Occasional wrong word or "sound a like" substitutions may have occurred due to the inherent limitations of voice recognition software  Read the chart carefully and recognize, using context, where substitutions have occurred  Electronically Signed by TOPHER Golden    ______________________________________________________________________    Chief Complaint: No chief complaint on file  Patient ID: Deion Newton is a 67 y o  y o  male has a past medical history of Anesthesia complication, Arthritis, BPH (benign prostatic hyperplasia), Cancer ([facility] ), COPD (chronic obstructive pulmonary disease) ([facility] ), COPD exacerbation ([facility] ) (12/29/2019), Full dentures, Hiatal hernia, History of methicillin resistant staphylococcus aureus (MRSA), Hypertension, Irritable bowel syndrome, Kidney stone, Liver disease, Pulmonary emphysema ([facility] ), RSV infection (12/2017), and Wears glasses  5/26/2021  Patient presents today for follow-up visit  HPI 2rd is a 48-year-old male who was last seen by Dr Flora Contreras in January 2021  He has a history of alpha-1 antitrypsin deficiency and receives IV infusion every Monday  He also has severe centrilobular emphysema for which his FEV1 is 1 48 L or 44% of predicted  He also had a CT of chest done in January 13, 2021 there was some residual ground-glass density seen in left lower lung  Patient was seen in the emergency department in April 30, 2021  He was stable and also had a venous lower lobe duplex which was normal     Patient was hospitalized in March 2021  He had ambulatory dysfunction and was discharged to short-term rehabilitation  This was weakness that was generalized  He also had a chest x-ray done during this time that showed emphysematous changes with no focal consolidation pleural effusion or pneumothorax  Review of Systems   Constitutional: Negative  HENT: Negative  Eyes: Negative  Respiratory: Positive for shortness of breath  Cardiovascular: Negative  Gastrointestinal: Negative  Endocrine: Negative  Genitourinary: Negative  Musculoskeletal: Negative  Allergic/Immunologic: Negative  Neurological: Negative  Hematological: Negative  Psychiatric/Behavioral: Negative  Smoking history: He reports that he quit smoking about 3 years ago  He has a 60 00 pack-year smoking history  He has never used smokeless tobacco     The following portions of the patient's history were reviewed and updated as appropriate: current medications, past family history, past medical history, past social history, past surgical history and problem list     Immunization History   Administered Date(s) Administered    INFLUENZA 09/27/2016, 09/27/2018, 09/28/2019, 09/25/2020    Influenza, seasonal, injectable 09/27/2013, 10/23/2014    Pneumococcal Conjugate 13-Valent 04/26/2016    Pneumococcal Polysaccharide PPV23 04/23/2011    SARS-CoV-2 / COVID-19 mRNA IM (Moderna) 01/21/2021, 03/01/2021    Tdap 04/26/2016    Zoster 10/29/2014, 04/27/2015, 07/24/2018    Zoster Vaccine Recombinant 05/24/2018, 07/24/2018     Current Outpatient Medications   Medication Sig Dispense Refill    acetaminophen (TYLENOL) 500 mg tablet Take 2 tablets (1,000 mg total) by mouth every 8 (eight) hours (Patient not taking: Reported on 3/3/2021) 90 tablet 3    amLODIPine (NORVASC) 10 mg tablet       budesonide (PULMICORT) 0 5 mg/2 mL nebulizer solution Take 1 vial (0 5 mg total) by nebulization 2 (two) times a day Rinse mouth after use   60 vial 11    busPIRone (BUSPAR) 10 mg tablet Take 1 tablet (10 mg total) by mouth 3 (three) times a day 90 tablet 2    cholestyramine sugar free (QUESTRAN LIGHT) 4 g packet Take 1 packet (4 g total) by mouth 2 (two) times a day 60 packet 1    Diclofenac Sodium (VOLTAREN) 1 % Apply 2 g topically 4 (four) times a day Apply to R knee 50 g 0    doxylamine (Unisom SleepTabs) 25 MG tablet Take 25 mg by mouth daily at bedtime as needed for sleep      famotidine (PEPCID) 20 mg tablet Take 1 tablet (20 mg total) by mouth daily (Patient not taking: Reported on 3/3/2021) 30 tablet 3    finasteride (PROSCAR) 5 mg tablet Take 1 tablet (5 mg total) by mouth every morning (Patient not taking: Reported on 3/3/2021) 90 tablet 3    fluticasone (FLONASE) 50 mcg/act nasal spray 2 sprays into each nostril daily 16 g 5    formoterol (Perforomist) 20 MCG/2ML nebulizer solution Take 1 vial (20 mcg total) by nebulization 2 (two) times a day 60 vial 11    gabapentin (NEURONTIN) 300 mg capsule Take 1 capsule (300 mg total) by mouth 3 (three) times a day 90 capsule 6    guaiFENesin (ROBITUSSIN) 100 MG/5ML oral liquid Take 200 mg by mouth 2 (two) times a day      ipratropium-albuterol (DUO-NEB) 0 5-2 5 mg/3 mL nebulizer solution inhale contents of 1 vial via nebulizer 4 times a day as needed 360 mL 0    LORazepam (ATIVAN) 1 mg tablet 1 tablet twice daily for anxiety 60 tablet 0    midodrine (PROAMATINE) 2 5 mg tablet Take 1 tablet (2 5 mg total) by mouth 3 (three) times a day 90 tablet 11    OLANZapine (ZyPREXA) 7 5 mg tablet Take 1 tablet (7 5 mg total) by mouth daily at bedtime 30 tablet 2    OXYGEN-HELIUM IN Inhale 2L at rest- 3L with activity      pantoprazole (PROTONIX) 40 mg tablet Take 1 tablet (40 mg total) by mouth 2 (two) times a day 180 tablet 3    tamsulosin (FLOMAX) 0 4 mg Half a tablet daily (Patient not taking: Reported on 3/3/2021) 30 capsule 5    tamsulosin (FLOMAX) 0 4 mg Take 0 4 mg by mouth daily      traZODone (DESYREL) 150 mg tablet Take 1 tablet (150 mg total) by mouth daily at bedtime 30 tablet 2    triamterene-hydrochlorothiazide (DYAZIDE) 37 5-25 mg per capsule Take 1 capsule by mouth every morning 1 daily for 3 days when needed for edema 30 capsule 0    Ventolin  (90 Base) MCG/ACT inhaler Inhale 1 puff every 6 (six) hours as needed for shortness of breath 1 Inhaler 11    ZEMAIRA infusion once a week        No current facility-administered medications for this visit  Allergies: Chantix [varenicline]    Objective:  Vitals:    05/26/21 0919   BP: 142/82   Pulse: (!) 122   Resp: 12   Temp: (!) 97 3 °F (36 3 °C)   TempSrc: Tympanic   SpO2: 95%   Height: 6' 5" (1 956 m)   Oxygen Therapy  SpO2: 95 Carrie@[employer] com pulse)    Wt Readings from Last 3 Encounters:   05/13/21 83 4 kg (183 lb 12 8 oz)   04/30/21 83 9 kg (185 lb)   04/06/21 83 9 kg (185 lb)     Body mass index is 21 8 kg/m²  Physical Exam  Constitutional:       Appearance: He is ill-appearing  HENT:      Head: Atraumatic  Nose: Nose normal       Mouth/Throat:      Mouth: Mucous membranes are moist    Cardiovascular:      Rate and Rhythm: Normal rate  Pulmonary:      Comments: Shortness of breath with exertion  Abdominal:      General: Abdomen is flat  Musculoskeletal: Normal range of motion  Skin:     General: Skin is warm and dry  Capillary Refill: Capillary refill takes less than 2 seconds  Neurological:      General: No focal deficit present  Mental Status: He is alert and oriented to person, place, and time     Psychiatric:         Mood and Affect: Mood normal          Lab Review:   Admission on 03/02/2021, Discharged on 03/03/2021   Component Date Value    Color, UA 03/02/2021 Light Yellow     Clarity, UA 03/02/2021 Clear     Specific Gravity, UA 03/02/2021 1 020     pH, UA 03/02/2021 8 0     Leukocytes, UA 03/02/2021 Negative     Nitrite, UA 03/02/2021 Negative     Protein, UA 03/02/2021 Negative     Glucose, UA 03/02/2021 Negative     Ketones, UA 03/02/2021 Negative     Urobilinogen, UA 03/02/2021 0 2     Bilirubin, UA 03/02/2021 Negative     Blood, UA 03/02/2021 Negative     WBC 03/02/2021 7 03     RBC 03/02/2021 4 35     Hemoglobin 03/02/2021 13 6     Hematocrit 03/02/2021 41 7     MCV 03/02/2021 96     MCH 03/02/2021 31 3     MCHC 03/02/2021 32 6     RDW 03/02/2021 13 1     MPV 03/02/2021 9 1     Platelets 38/49/7711 135*  nRBC 03/02/2021 0     Neutrophils Relative 03/02/2021 67     Immat GRANS % 03/02/2021 0     Lymphocytes Relative 03/02/2021 20     Monocytes Relative 03/02/2021 9     Eosinophils Relative 03/02/2021 3     Basophils Relative 03/02/2021 1     Neutrophils Absolute 03/02/2021 4 72     Immature Grans Absolute 03/02/2021 0 03     Lymphocytes Absolute 03/02/2021 1 40     Monocytes Absolute 03/02/2021 0 62     Eosinophils Absolute 03/02/2021 0 21     Basophils Absolute 03/02/2021 0 05     Sodium 03/02/2021 136     Potassium 03/02/2021 4 3     Chloride 03/02/2021 102     CO2 03/02/2021 26     ANION GAP 03/02/2021 8     BUN 03/02/2021 12     Creatinine 03/02/2021 1 09     Glucose 03/02/2021 115     Calcium 03/02/2021 8 7     eGFR 03/02/2021 65     Magnesium 03/02/2021 1 7     TSH 3RD GENERATON 03/02/2021 0 909     Troponin I 03/02/2021 <0 02     SARS-CoV-2 03/02/2021 Negative     INFLUENZA A PCR 03/02/2021 Negative     INFLUENZA B PCR 03/02/2021 Negative     RSV PCR 03/02/2021 Negative     Total CK 03/02/2021 80     Sodium 03/03/2021 138     Potassium 03/03/2021 3 8     Chloride 03/03/2021 104     CO2 03/03/2021 27     ANION GAP 03/03/2021 7     BUN 03/03/2021 11     Creatinine 03/03/2021 1 04     Glucose 03/03/2021 101     Calcium 03/03/2021 8 6     eGFR 03/03/2021 69     Phosphorus 03/03/2021 3 1     Magnesium 03/03/2021 1 8     WBC 03/03/2021 6 10     RBC 03/03/2021 4 25     Hemoglobin 03/03/2021 13 2     Hematocrit 03/03/2021 40 0     MCV 03/03/2021 94     MCH 03/03/2021 31 1     MCHC 03/03/2021 33 0     RDW 03/03/2021 13 1     MPV 03/03/2021 8 9     Platelets 46/90/5749 114*    nRBC 03/03/2021 0     Neutrophils Relative 03/03/2021 51     Immat GRANS % 03/03/2021 0     Lymphocytes Relative 03/03/2021 33     Monocytes Relative 03/03/2021 10     Eosinophils Relative 03/03/2021 5     Basophils Relative 03/03/2021 1     Neutrophils Absolute 03/03/2021 3 09     Immature Grans Absolute 03/03/2021 0 01     Lymphocytes Absolute 03/03/2021 2 01     Monocytes Absolute 03/03/2021 0 61     Eosinophils Absolute 03/03/2021 0 33     Basophils Absolute 03/03/2021 0 05     Ventricular Rate 03/02/2021 76     Atrial Rate 03/02/2021 76     ND Interval 03/02/2021 176     QRSD Interval 03/02/2021 96     QT Interval 03/02/2021 398     QTC Interval 03/02/2021 447     P Axis 03/02/2021 110     QRS Axis 03/02/2021 19     T Wave [place] 03/02/2021 28    Admission on 02/22/2021, Discharged on 02/24/2021   Component Date Value    WBC 02/22/2021 6 45     RBC 02/22/2021 4 11     Hemoglobin 02/22/2021 13 1     Hematocrit 02/22/2021 39 3     MCV 02/22/2021 96     MCH 02/22/2021 31 9     MCHC 02/22/2021 33 3     RDW 02/22/2021 13 1     MPV 02/22/2021 8 9     Platelets 74/92/5277 138*    nRBC 02/22/2021 0     Neutrophils Relative 02/22/2021 65     Immat GRANS % 02/22/2021 1     Lymphocytes Relative 02/22/2021 22     Monocytes Relative 02/22/2021 9     Eosinophils Relative 02/22/2021 2     Basophils Relative 02/22/2021 1     Neutrophils Absolute 02/22/2021 4 27     Immature Grans Absolute 02/22/2021 0 03     Lymphocytes Absolute 02/22/2021 1 40     Monocytes Absolute 02/22/2021 0 58     Eosinophils Absolute 02/22/2021 0 12     Basophils Absolute 02/22/2021 0 05     Sodium 02/22/2021 139     Potassium 02/22/2021 3 9     Chloride 02/22/2021 104     CO2 02/22/2021 28     ANION GAP 02/22/2021 7     BUN 02/22/2021 12     Creatinine 02/22/2021 1 37*    Glucose 02/22/2021 105     Calcium 02/22/2021 9 5     AST 02/22/2021 23     ALT 02/22/2021 35     Alkaline Phosphatase 02/22/2021 69     Total Protein 02/22/2021 6 7     Albumin 02/22/2021 3 6     Total Bilirubin 02/22/2021 0 90     eGFR 02/22/2021 49     Protime 02/22/2021 14 0     INR 02/22/2021 1 09     PTT 02/22/2021 31     Troponin I 02/22/2021 <0 02     Total CK 02/22/2021 72     SARS-CoV-2 02/22/2021 Negative     INFLUENZA A PCR 02/22/2021 Negative     INFLUENZA B PCR 02/22/2021 Negative     RSV PCR 02/22/2021 Negative     Platelets 80/07/9078 110*    MPV 02/23/2021 8 8*    Sodium 02/23/2021 140     Potassium 02/23/2021 3 4*    Chloride 02/23/2021 106     CO2 02/23/2021 26     ANION GAP 02/23/2021 8     BUN 02/23/2021 12     Creatinine 02/23/2021 1 28     Glucose 02/23/2021 104     Calcium 02/23/2021 8 6     eGFR 02/23/2021 54     WBC 02/23/2021 6 86     RBC 02/23/2021 3 99     Hemoglobin 02/23/2021 12 6     Hematocrit 02/23/2021 39 0     MCV 02/23/2021 98     MCH 02/23/2021 31 6     MCHC 02/23/2021 32 3     RDW 02/23/2021 13 0     MPV 02/23/2021 9 4     Platelets 23/18/0355 121*    nRBC 02/23/2021 0     Neutrophils Relative 02/23/2021 43     Immat GRANS % 02/23/2021 0     Lymphocytes Relative 02/23/2021 42     Monocytes Relative 02/23/2021 10     Eosinophils Relative 02/23/2021 4     Basophils Relative 02/23/2021 1     Neutrophils Absolute 02/23/2021 2 97     Immature Grans Absolute 02/23/2021 0 03     Lymphocytes Absolute 02/23/2021 2 89     Monocytes Absolute 02/23/2021 0 68     Eosinophils Absolute 02/23/2021 0 24     Basophils Absolute 02/23/2021 0 05     Magnesium 02/23/2021 1 9     Phosphorus 02/23/2021 2 8     Sodium 02/24/2021 140     Potassium 02/24/2021 4 0     Chloride 02/24/2021 105     CO2 02/24/2021 27     ANION GAP 02/24/2021 8     BUN 02/24/2021 14     Creatinine 02/24/2021 1 12     Glucose 02/24/2021 100     Calcium 02/24/2021 8 3     eGFR 02/24/2021 63     WBC 02/24/2021 5 56     RBC 02/24/2021 4 21     Hemoglobin 02/24/2021 13 1     Hematocrit 02/24/2021 39 3     MCV 02/24/2021 93     MCH 02/24/2021 31 1     MCHC 02/24/2021 33 3     RDW 02/24/2021 13 1     MPV 02/24/2021 8 6*    Platelets 89/92/4556 133*    nRBC 02/24/2021 0     Phosphorus 02/24/2021 3 4     Magnesium 02/24/2021 1 7     Segmented % 02/24/2021 44     Bands % 02/24/2021 1     Lymphocytes % 02/24/2021 38     Monocytes % 02/24/2021 12     Eosinophils, % 02/24/2021 4     Basophils % 02/24/2021 0     Atypical Lymphocytes % 02/24/2021 1*    Absolute Neutrophils 02/24/2021 2 50     Lymphocytes Absolute 02/24/2021 2 11     Monocytes Absolute 02/24/2021 0 67     Eosinophils Absolute 02/24/2021 0 22     Basophils Absolute 02/24/2021 0 00     Total Counted 02/24/2021 100     RBC Morphology 02/24/2021 Normal     Platelet Estimate 79/19/6000 Borderline*    Ventricular Rate 02/22/2021 76     Atrial Rate 02/22/2021 76     TX Interval 02/22/2021 158     QRSD Interval 02/22/2021 100     QT Interval 02/22/2021 402     QTC Interval 02/22/2021 452     P Axis 02/22/2021 27     QRS Axis 02/22/2021 34     T Wave Axis 02/22/2021 26        Past Surgical History:   Procedure Laterality Date    BACK SURGERY  2008    discectomy    COLONOSCOPY      COLONOSCOPY N/A 3/10/2017    Procedure: Bettie Johns;  Surgeon: Carol Gr MD;  Location: [place] [address] GI LAB; Service:    Colene Low      removal of kidney stones    ESOPHAGOGASTRODUODENOSCOPY N/A 3/10/2017    Procedure: ESOPHAGOGASTRODUODENOSCOPY (EGD); Surgeon: Carol Gr MD;  Location: [facility]; Service:     LITHOTRIPSY      TX ESOPHAGOGASTRODUODENOSCOPY TRANSORAL DIAGNOSTIC N/A 11/20/2018    Procedure: ESOPHAGOGASTRODUODENOSCOPY (EGD); Surgeon: Carol Gr MD;  Location: [facility];   Service: Gastroenterology    TONSILLECTOMY      VEIN LIGATION AND STRIPPING Bilateral     18's        Family History   Problem Relation Age of Onset    Emphysema Mother         never smoked    Emphysema Father     Cancer Brother         colon    Colon cancer Brother     Ulcerative colitis Family     Liver disease Family         Diagnostics:  I have personally reviewed pertinent films in PACS    Office Spirometry Results:     ESS:    Vas Lower Limb Venous Duplex Study, Complete Bilateral    Result Date: 5/5/2021  Narrative:  [facility] REPORT CLINICAL: Indications: Patient presents with bilateral lower extremity pain and swelling x several days  Operative History: 1980 bilateral great saphenous vein ligation Risk Factors The patient has history of HTN and previous smoking (quit >10yrs ago)  FINDINGS:  Segment  Right            Left              Impression       Impression       CFV      Normal (Patent)  Normal (Patent)     CONCLUSION:  Impression: RIGHT LOWER LIMB: No evidence of acute or chronic deep vein thrombosis  No evidence of superficial thrombophlebitis noted  Doppler evaluation shows a normal response to augmentation maneuvers  Popliteal, posterior tibial and anterior tibial arterial Doppler waveforms are triphasic  LEFT LOWER LIMB: No evidence of acute or chronic deep vein thrombosis  No evidence of superficial thrombophlebitis noted  Doppler evaluation shows a normal response to augmentation maneuvers  Popliteal, posterior tibial and anterior tibial arterial Doppler waveforms are triphasic  Technical findings were given to DR Benites 17:30  SIGNATURE: Electronically Signed by: Fela Abrams on 2021-05-05 07:25:00 AM

## 2021-05-26 NOTE — ASSESSMENT & PLAN NOTE
Patient had his last PFT done in October 2019  At that time forced vital capacity was 3 373 L or 60% of predicted, FEV1 was 1 60 L or 40% of predicted obstruction ratio 43%  Plan includes repeat PFT when COVID-19 restrictions are listed  He continues to use via nebulizer performance 20 mcg b i d  And budesonide 0 5 b i d  He does use either rescue inhalation or also DuoNebs at least twice or 3 times daily  Patient is somewhat debilitated since last visit  He states that this is been ongoing issue since March of 2021 when he was hospitalized and then went to short-term rehabilitation

## 2021-05-26 NOTE — ASSESSMENT & PLAN NOTE
Patient continues to use an benefit from supplemental oxygen  Room air oxygen at rest was 92%  Room air oxygen on 3 L pulse was 95%  With ambulating 250 ft or 2 laps his O2 sat on 3 L was 88%  We then increase his portable concentrator to 5 L pulsed and his O2 sat with ambulation was 93% and at rest was 92%  Patient continues to use an benefit from supplemental oxygen  He is mostly housebound at this point  I did instruct him that when he leaves the home portable concentrator should be at a minimum of 5 L with ambulation  He understands the same

## 2021-05-26 NOTE — ASSESSMENT & PLAN NOTE
Patient has history of alpha 1 antitrypsin  He does get weekly Zemaira injections  He continues to follow-up and plan includes continuation of the same    Does go to outpatient infusion for the same

## 2021-06-03 DIAGNOSIS — F41.9 ANXIETY: ICD-10-CM

## 2021-06-03 RX ORDER — LORAZEPAM 1 MG/1
TABLET ORAL
Qty: 60 TABLET | Refills: 0 | Status: SHIPPED | OUTPATIENT
Start: 2021-06-03 | End: 2021-07-08

## 2021-06-04 ENCOUNTER — TELEPHONE (OUTPATIENT)
Dept: FAMILY MEDICINE CLINIC | Facility: CLINIC | Age: 76
End: 2021-06-04

## 2021-06-08 ENCOUNTER — OFFICE VISIT (OUTPATIENT)
Dept: PALLIATIVE MEDICINE | Facility: CLINIC | Age: 76
End: 2021-06-08
Payer: MEDICARE

## 2021-06-08 VITALS
SYSTOLIC BLOOD PRESSURE: 141 MMHG | HEART RATE: 91 BPM | WEIGHT: 183 LBS | DIASTOLIC BLOOD PRESSURE: 68 MMHG | RESPIRATION RATE: 18 BRPM | TEMPERATURE: 96 F | BODY MASS INDEX: 21.7 KG/M2

## 2021-06-08 DIAGNOSIS — R26.81 UNSTEADY GAIT: Chronic | ICD-10-CM

## 2021-06-08 DIAGNOSIS — I87.2 CHRONIC VENOUS INSUFFICIENCY: ICD-10-CM

## 2021-06-08 DIAGNOSIS — I27.20 PULMONARY HYPERTENSION (HCC): ICD-10-CM

## 2021-06-08 DIAGNOSIS — K21.9 GASTROESOPHAGEAL REFLUX DISEASE, UNSPECIFIED WHETHER ESOPHAGITIS PRESENT: ICD-10-CM

## 2021-06-08 DIAGNOSIS — Z51.5 PALLIATIVE CARE PATIENT: ICD-10-CM

## 2021-06-08 DIAGNOSIS — R45.89 DYSPHORIC MOOD: ICD-10-CM

## 2021-06-08 DIAGNOSIS — G47.00 INSOMNIA, UNSPECIFIED TYPE: ICD-10-CM

## 2021-06-08 DIAGNOSIS — E88.01 AAT (ALPHA-1-ANTITRYPSIN) DEFICIENCY (HCC): Chronic | ICD-10-CM

## 2021-06-08 DIAGNOSIS — F41.9 ANXIOUSNESS: ICD-10-CM

## 2021-06-08 DIAGNOSIS — J96.11 CHRONIC RESPIRATORY FAILURE WITH HYPOXIA (HCC): Chronic | ICD-10-CM

## 2021-06-08 DIAGNOSIS — J43.2 CENTRILOBULAR EMPHYSEMA (HCC): Primary | ICD-10-CM

## 2021-06-08 DIAGNOSIS — I27.82 OTHER CHRONIC PULMONARY EMBOLISM WITHOUT ACUTE COR PULMONALE (HCC): ICD-10-CM

## 2021-06-08 PROCEDURE — 99214 OFFICE O/P EST MOD 30 MIN: CPT | Performed by: INTERNAL MEDICINE

## 2021-06-08 RX ORDER — OLANZAPINE 10 MG/1
10 TABLET ORAL
Qty: 30 TABLET | Refills: 2 | Status: SHIPPED | OUTPATIENT
Start: 2021-06-08 | End: 2021-08-10 | Stop reason: SDUPTHER

## 2021-06-08 RX ORDER — TRAZODONE HYDROCHLORIDE 150 MG/1
150 TABLET ORAL
Qty: 30 TABLET | Refills: 2 | Status: SHIPPED | OUTPATIENT
Start: 2021-06-08 | End: 2021-08-10 | Stop reason: SDUPTHER

## 2021-06-08 NOTE — PROGRESS NOTES
Follow-up with Palliative and Supportive Care  Elvira Moeller 67 y o  male [id number]    ASSESSMENT & PLAN:  1  Centrilobular emphysema ([facility] )    2  AAT (alpha-1-antitrypsin) deficiency ([facility] )    3  Chronic respiratory failure with hypoxia (HCC)    4  Pulmonary hypertension ([facility] )    5  Chronic venous insufficiency    6  Other chronic pulmonary embolism without acute cor pulmonale (HCC)    7  Gastroesophageal reflux disease, unspecified whether esophagitis present    8  Unsteady gait    9  Insomnia, unspecified type    10  Dysphoric mood    11  Anxiousness    12  Palliative care patient           Patient having recurrent falls, typically s/t not remembering to use his cane or not being able to use his cane (taking dishes to sink in 2 hands, etc)  One mechanical fall occurred when using walker  Strongly recommended using cane/walker, getting 3PC, getting LifeAlert or smartwatch to contact family or 911 in the even of a fall   Olanzapine helpful for anxiousness, insomnia, appetite, but weight loss has only slowed  Increasing to 10mg QHS   Trazodone 150mg HS helpful for sleep; sleep onset still problematic about 1x/week  Patient wishes to continue current dose   Continue lorazepam BID PRN   Continue Tylenol, Voltaren gel (he is using PRN)  Effective   Continue Questran; he is using PRN  Helpful for his diarrhea   Respiratory status stable  Using 3LPM continuously at home, 5LPM when out of the home   Patient declines ACP and does not wish to engage on ACP in the future  He is comfortable w/ his daughter Terrie Gupta making decisions for him  He states he wants "everything done"   Emotional support provided   Medication safety issues addressed - no driving under the influence of narcotics, watch for adverse effects including AMS and respiratory depression, keep medications stored in a safe/locked environment     Reviewed notes (ED, Pulmonology, Care Coordination), labs (Cr 1 04 on 3/3/21), imaging (4/30/21 Duplex US showing no DVT)  Requested Prescriptions     Signed Prescriptions Disp Refills    OLANZapine (ZyPREXA) 10 mg tablet 30 tablet 2     Sig: Take 1 tablet (10 mg total) by mouth daily at bedtime    traZODone (DESYREL) 150 mg tablet 30 tablet 2     Sig: Take 1 tablet (150 mg total) by mouth daily at bedtime       Medications Discontinued During This Encounter   Medication Reason    tamsulosin (FLOMAX) 0 4 mg Duplicate order    famotidine (PEPCID) 20 mg tablet Therapy completed    OLANZapine (ZyPREXA) 7 5 mg tablet Reorder    traZODone (DESYREL) 150 mg tablet Reorder       Representatives have queried the patient's controlled substance dispensing history in the Prescription Drug Monitoring Program in compliance with regulations before I have prescribed any controlled substances  The prescription history is consistent with prescribed therapy and our practice policies  25+ minutes were spent face to face with patient with greater than 50% of the time spent in counseling or coordination of care including discussions of symptom assessment and management, medication review and adjustment, psychosocial support, chart review, imaging review, lab review, advanced directives and goals of care  All of the patient's questions were answered during this discussion  Return in about 2 months (around 8/8/2021)  SUBJECTIVE:  Chief Complaint   Patient presents with    Follow-up    COPD    Shortness of Breath    Gait Problem    Anorexia    Weight Loss    Medication Refill        HPI    Nathaly Ignacio is a 67 y o  male w/ severe emphysema w/ alpha-1-antitrypsin deficiency, chronic hypoxic respiratory failure on 3LPM O2, pulmonary hypertension w/ chronic PE, former tobacco dependence (60 pack-years, quit 2017), OA, BPH, IBS, idiopathic neuropathy, h/o BCC of the face and neck, anxiousness, insomnia  He follows w/ Alexander Gutierrez PA-C / Dr Yanet Haile (Pulmonology), Dr Awilda Fonseca (Cardiology)       Patient is known to [facility]; last seen by me 12/8/20 for symptom management, psychosocial support  Visit today for same  Patient seen in the [address] [place] [address] ED 4/30/21 for R>L LLE edema  Patient notes recurring falls, most recently 2 weeks ago  He fell once while using he walker (mechanical fall), but typically the falls occur when he forgets to use his cane or cannot use it (carrying dishes, etc)  He is considering getting a LifeAlert  He continues to live alone  Patient reports his diarrhea is mostly controlled  He denies N/V  He reports low appetite, but his weight loss has slowed since initiation of olanzapine  Sleep improves w/ lorazepam, olanzapine, trazodone - though onset can be challenging about 1x/week despite using these medications  He is using 3LPM O2 in the alia, 5LPM when out of the home  PDMP shows no concerns  The following portions of the medical history were reviewed: past medical history, problem list, medication list, and social history      Current Outpatient Medications:     acetaminophen (TYLENOL) 500 mg tablet, Take 2 tablets (1,000 mg total) by mouth every 8 (eight) hours, Disp: 90 tablet, Rfl: 3    amLODIPine (NORVASC) 10 mg tablet, , Disp: , Rfl:     budesonide (PULMICORT) 0 5 mg/2 mL nebulizer solution, Take 1 vial (0 5 mg total) by nebulization 2 (two) times a day Rinse mouth after use , Disp: 60 vial, Rfl: 11    busPIRone (BUSPAR) 10 mg tablet, Take 1 tablet (10 mg total) by mouth 3 (three) times a day, Disp: 90 tablet, Rfl: 2    cholestyramine sugar free (QUESTRAN LIGHT) 4 g packet, Take 1 packet (4 g total) by mouth 2 (two) times a day, Disp: 60 packet, Rfl: 1    Diclofenac Sodium (VOLTAREN) 1 %, Apply 2 g topically 4 (four) times a day Apply to R knee, Disp: 50 g, Rfl: 0    doxylamine (Unisom SleepTabs) 25 MG tablet, Take 25 mg by mouth daily at bedtime as needed for sleep, Disp: , Rfl:     finasteride (PROSCAR) 5 mg tablet, Take 1 tablet (5 mg total) by mouth every morning, Disp: 90 tablet, Rfl: 3    fluticasone (FLONASE) 50 mcg/act nasal spray, 2 sprays into each nostril daily, Disp: 16 g, Rfl: 5    formoterol (Perforomist) 20 MCG/2ML nebulizer solution, Take 1 vial (20 mcg total) by nebulization 2 (two) times a day, Disp: 60 vial, Rfl: 11    gabapentin (NEURONTIN) 300 mg capsule, Take 1 capsule (300 mg total) by mouth 3 (three) times a day, Disp: 90 capsule, Rfl: 6    guaiFENesin (ROBITUSSIN) 100 MG/5ML oral liquid, Take 200 mg by mouth 2 (two) times a day, Disp: , Rfl:     ipratropium-albuterol (DUO-NEB) 0 5-2 5 mg/3 mL nebulizer solution, inhale contents of 1 vial via nebulizer 4 times a day as needed, Disp: 360 mL, Rfl: 0    LORazepam (ATIVAN) 1 mg tablet, TAKE ONE TABLET BY MOUTH TWICE DAILY, Disp: 60 tablet, Rfl: 0    midodrine (PROAMATINE) 2 5 mg tablet, Take 1 tablet (2 5 mg total) by mouth 3 (three) times a day, Disp: 90 tablet, Rfl: 11    OLANZapine (ZyPREXA) 10 mg tablet, Take 1 tablet (10 mg total) by mouth daily at bedtime, Disp: 30 tablet, Rfl: 2    OXYGEN-HELIUM IN, Inhale 2L at rest- 3L with activity, Disp: , Rfl:     pantoprazole (PROTONIX) 40 mg tablet, Take 1 tablet (40 mg total) by mouth 2 (two) times a day, Disp: 180 tablet, Rfl: 3    tamsulosin (FLOMAX) 0 4 mg, Take 0 4 mg by mouth daily, Disp: , Rfl:     traZODone (DESYREL) 150 mg tablet, Take 1 tablet (150 mg total) by mouth daily at bedtime, Disp: 30 tablet, Rfl: 2    triamterene-hydrochlorothiazide (DYAZIDE) 37 5-25 mg per capsule, Take 1 capsule by mouth every morning 1 daily for 3 days when needed for edema, Disp: 30 capsule, Rfl: 0    Ventolin  (90 Base) MCG/ACT inhaler, Inhale 1 puff every 6 (six) hours as needed for shortness of breath, Disp: 1 Inhaler, Rfl: 11    ZEMAIRA infusion, once a week , Disp: , Rfl:     Review of Systems   Constitutional: Positive for activity change, appetite change, fatigue and unexpected weight change (weight loss has slowed)     Eyes: Negative for pain and redness  Respiratory: Positive for shortness of breath (at rest, worse on exertion)  Negative for chest tightness  Cardiovascular: Negative for chest pain  Gastrointestinal: Positive for diarrhea  Negative for abdominal pain, constipation, nausea and vomiting  Endocrine: Negative for polydipsia and polyphagia  Musculoskeletal: Positive for arthralgias and gait problem (w/ recurrent falls)  Skin: Positive for pallor  Allergic/Immunologic: Positive for immunocompromised state  Neurological: Positive for weakness  Negative for facial asymmetry  Psychiatric/Behavioral: Positive for dysphoric mood and sleep disturbance  The patient is nervous/anxious  OBJECTIVE:  /68 (BP Location: Right arm, Patient Position: Sitting, Cuff Size: Standard)   Pulse 91   Temp (!) 96 °F (35 6 °C) (Temporal)   Resp 18   Wt 83 kg (183 lb)   BMI 21 70 kg/m²   Vital signs reviewed  Physical Exam:  Constitutional: Frail, debilitated, thin, chronically ill-appearing, pale, elderly male  In no acute physical or emotional distress  Ambulating w/ 1PC  Head: Normocephalic and atraumatic  Eyes: EOM are normal  No ocular discharge  No scleral icterus  Neck: No visible adenopathy or masses  Respiratory: Effort normal on O2 delivered via NC  No stridor  No respiratory distress  Speaking comfortably in complete sentences  Gastrointestinal: No abdominal distension  Musculoskeletal: No edema  Neurological: Alert, oriented and appropriately conversant  No focal deficits  Follows commands appropriately  Skin: No diaphoresis, no rashes seen on exposed areas of skin  Psychiatric: Displays a normal mood and affect  Behavior, judgment and thought content appear normal      Mariana Muñoz MD  [facility] Supportive [facility]  [phone]    Portions of this document may have been created using dictation software and as such some "sound alike" terms may have been generated by the system   Do not hesitate to contact me with any questions or clarifications

## 2021-06-08 NOTE — PATIENT INSTRUCTIONS
It was a pleasure to see you again today  Thank you for coming in  · Please consider getting a LifeAlert medallion or bracelet, or a smartwatch which can connect to your smartphone  These devices can contact 911, or contact a family member, should you fall and be unable to get up quickly  · Use your cane or walker every time you ambulate  · Increasing olanzapine to 10mg at bedtime, to better stimulate appetite  · Continue other medications  · Return in about 2 months  · Call us for refills on medications that we supply, as needed  · If something changes and you need to come in sooner, please call our office  PRESCRIPTION REFILL REMINDER:  All medication refills should be requested prior to [facility] on Friday  Any refill requests after noon on Friday would be addressed the following Monday  Please protect yourself from the novel Coronavirus (COVID-19)! The numbers of cases of Coronavirus are spiking in [address]  This is not a more dangerous virus, but a sign that more people in a community are spreading the virus  Please check the local disease reports near you if you consider travelling this summer  We do not advise travel to any community or State with a rising viral caseload   Wash your hands! Soap and water, or hand  with at least 60% alcohol, are both effective at killing the virus   Wear a mask! This will help protect others from any virus particles you might spread  Your mouth and nose BOTH need to be covered   Keep the distance! Keep 6 feet of distance from others people, even if they seem healthy  Keeping distance protects you from the other person's virus spread   Thank you for completing the [address] vaccination series

## 2021-06-09 ENCOUNTER — TELEPHONE (OUTPATIENT)
Dept: FAMILY MEDICINE CLINIC | Facility: CLINIC | Age: 76
End: 2021-06-09

## 2021-06-10 ENCOUNTER — TELEPHONE (OUTPATIENT)
Dept: FAMILY MEDICINE CLINIC | Facility: CLINIC | Age: 76
End: 2021-06-10

## 2021-06-15 ENCOUNTER — PATIENT OUTREACH (OUTPATIENT)
Dept: FAMILY MEDICINE CLINIC | Facility: CLINIC | Age: 76
End: 2021-06-15

## 2021-06-15 NOTE — PROGRESS NOTES
Outreach to patient  Pt reports that he feels winded at times but has not had to increase his oxygen use  Pt uses 3 liters while in his home and 5 liters when he leaves his home  Speaking in full sentences  Cough only in the morning with clear phlegm and occasional yellow phelgm  Denies any fever  Taking medications as directed including inhalers  Today he is going to drive himself to the club house to watch his friends play shuffleboard  Reviewed previous appointments and upcoming appointments  Advised to reach out to CM or pulmonary office if any worsening of symptoms  CM will continue to follow

## 2021-07-01 ENCOUNTER — TELEPHONE (OUTPATIENT)
Dept: FAMILY MEDICINE CLINIC | Facility: CLINIC | Age: 76
End: 2021-07-01

## 2021-07-01 NOTE — TELEPHONE ENCOUNTER
We got a form to be signed from [facility]    Copy attached    Original left in blue bin

## 2021-07-02 ENCOUNTER — EVALUATION (OUTPATIENT)
Dept: PHYSICAL THERAPY | Facility: CLINIC | Age: 76
End: 2021-07-02
Payer: MEDICARE

## 2021-07-02 DIAGNOSIS — R26.9 GAIT DISORDER: ICD-10-CM

## 2021-07-02 DIAGNOSIS — R53.81 PHYSICAL DECONDITIONING: Primary | ICD-10-CM

## 2021-07-02 PROCEDURE — 97162 PT EVAL MOD COMPLEX 30 MIN: CPT | Performed by: PHYSICAL THERAPIST

## 2021-07-02 NOTE — PROGRESS NOTES
PT Evaluation     Today's date: 2021  Patient name: Rhea Marquez  : 1945  MRN: [id number]  Referring provider: Judith Padilla MD  Dx:   Encounter Diagnosis     ICD-10-CM    1  Physical deconditioning  R53 81    2  Gait disorder  R26 9        Start Time: 1430  Stop Time: 1515  Total time in clinic (min): 45 minutes    Assessment  Assessment details: Patient is a 67 y o  male who presents to physical therapy with s/s consistent with gross physical deconditioning, balance deficit and gait abnormality  Patient presents to evaluation with pain, decreased range of motion, decreased strength and decreased tolerance to activity  I discussed risks, benefits, and alternatives to treatment, and answered all patient questions to patient satisfaction  Patient is an appropriate candidate for skilled PT, and would benefit from skilled PT services to address stated impairments, achieve goals, and to maximize function  Pt is in agreement with this plan  Thank you for the referral     Impairments: abnormal gait, abnormal or restricted ROM, activity intolerance, impaired balance, impaired physical strength, lacks appropriate home exercise program, pain with function, safety issue, weight-bearing intolerance, poor posture  and poor body mechanics    Symptom irritability: highBarriers to therapy: Pt is unable to ambulate from handicapped spot in parking lot, into clinic without extreme level of fatigue/SOB     Understanding of Dx/Px/POC: fair   Prognosis: fair    Goals  (STG) Impairment Goals 4-6 weeks  - Decrease pain to 2/10  - Improve knee AROM to equal to the unaffected lower extremity  - Increase knee strength to 4+/5 throughout  - TUG time <by 10 seconds    (LTG) Functional Goals 6-8 weeks  - Return to Prior Level of Function  - Patient will be independent with HEP  - Patient will be able to squat without increased pain/compensation/difficulty  - Patient will be able to perform sit to stand independently, without increased pain/compensation/difficulty   - Patient will be able to ascend and descend stairs without increased pain/compensation/difficulty   - Patient will ambulate with LRAD with normal gait speed and no LOB       Plan  Patient would benefit from: skilled physical therapy  Planned modality interventions: thermotherapy: hydrocollator packs and cryotherapy  Planned therapy interventions: abdominal trunk stabilization, balance, balance/weight bearing training, body mechanics training, flexibility, functional ROM exercises, gait training, home exercise program, transfer training, therapeutic exercise, therapeutic activities, stretching, strengthening, postural training, patient education and neuromuscular re-education  Frequency: 2x week  Duration in visits: 16  Duration in weeks: 8  Plan of Care beginning date: 7/2/2021  Treatment plan discussed with: patient        Subjective Evaluation    History of Present Illness  Mechanism of injury: HOME LIFE: Pt lives at home by himself  He has 2 stairs to get into his home through the front door and only one step to get in through the garage  PLOF:  Pt reports having multiple falls/week and using cane/walker for all ambulation  HISTORY OF CURRENT INJURY: Pt reports that a few months ago he did have some home physical therapy but this ended a few months ago but he ran out of visits and he has not received therapy in a few months now  Pt reports that he has not been in the hospital for a few months, he believes it was January  Pt reports that if he sits for too long, he cannot even pick his R leg up  He is also getting some pain in the R side of his neck  He reports that he uses his cane and walker throughout the day when he has to be on his feet  Says he has been using his cane/walker for about a year and a half, since he had a fall onto his R side  Says that his most recent fall was about 3 days ago  Says that his fall is usually due to lightheadedness vs loss of balance  He feels that his R leg is what limits him most when trying to walk and move  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  PAIN LOCATION/DESCRIPTORS: Pt reports having pain in his R leg and sometimes R neck  PATIENT GOALS: Pt would like to be able to walk better and to get rid of some of the pain in his R LE  Quality of life: good    Pain  Current pain ratin  At best pain ratin  At worst pain rating: 10  Location: pt reports pain begins in lower leg and radiates up towards his hip  Quality: throbbing and radiating  Relieving factors: rest  Aggravating factors: standing and walking    Social Support  Steps to enter house: yes  Stairs in house: no   L[facility] in: [facility]  Lives with: alone    Treatments  Previous treatment: physical therapy  Discharged from (in last 30 days): home health care  Patient Goals  Patient goal: To be able to walk better and to decrease R leg pain        Objective     Strength/Myotome Testing     Left Hip   Planes of Motion   Flexion: 3+    Right Hip   Planes of Motion   Flexion: 2    Left Knee   Left knee flexion strength: too painful to assess  Left knee extension strength: too painful to assess      Right Knee   Flexion: 4-  Extension: 4-    Left Ankle/Foot   Dorsiflexion: 3+  Plantar flexion: 3+    Right Ankle/Foot   Dorsiflexion: 4-  Plantar flexion: 4-    Ambulation     Comments   Sit to stand- Min-ModA with use of bessie arm rests  Gait- slow shuffling with use of SPC, decreased step length, forefoot strike, slow speed with moderately kyphotic posture and decreased knee and hip extension (SpO2 level 91 following sit to stand with short walk)  TUG- 43 seconds with use of cane, CGA to get out of chair and during test (SpO2 96 post test)     strength: R 23# L 7#               Diagnosis: Gross deconditioning/ gait abnormality   Precautions: Fall risk   Primary Goals: 1) improve LE strength 2) gait training 3) decrease R leg pain   *asterisks by exercise = given for HEP   Manuals                                                There Ex                                                                                Neuro Re-Ed                                                                                                         Re-evaluation              Ther Act                                         Modalities

## 2021-07-06 ENCOUNTER — TELEPHONE (OUTPATIENT)
Dept: OTHER | Facility: OTHER | Age: 76
End: 2021-07-06

## 2021-07-06 NOTE — TELEPHONE ENCOUNTER
Physical therapist called regarding pt appropriateness for out patient PT and has questions regarding in home services and insurance payment  Please call next business day   #[phone]

## 2021-07-07 ENCOUNTER — OFFICE VISIT (OUTPATIENT)
Dept: PHYSICAL THERAPY | Facility: CLINIC | Age: 76
End: 2021-07-07
Payer: MEDICARE

## 2021-07-07 DIAGNOSIS — R26.9 GAIT DISORDER: ICD-10-CM

## 2021-07-07 DIAGNOSIS — R53.81 PHYSICAL DECONDITIONING: Primary | ICD-10-CM

## 2021-07-07 PROCEDURE — 97110 THERAPEUTIC EXERCISES: CPT | Performed by: PHYSICAL THERAPIST

## 2021-07-07 NOTE — PROGRESS NOTES
Daily Note     Today's date: 2021  Patient name: Carolann Gould  : 1945  MRN: [id number]  Referring provider: Leigh Ann Box MD  Dx:   Encounter Diagnosis     ICD-10-CM    1  Physical deconditioning  R53 81    2  Gait disorder  R26 9        Start Time: 1050  Stop Time: 1140  Total time in clinic (min): 50 minutes    Subjective: Pt reports that there are no new concerns today  He has not had any falls since he was seen last  He arrives without his cane and notes that he left it at home by accident  Says he was tired following last visit  Objective: See treatment diary below      Assessment: Pt tolerated treatment well and we worked on some standing and walk endurance with seated rest break in between exercises  He did have more energy today since he used a wheelchair to get into clinic, vs walking in and expending all his energy  I asked him to use his walker more so we can avoid any possible falls and he will bring his walker to next session as well  He was fatigued by the end of the session  SpO2 did not get below 90 during session  Pt was on 4L of O2  Plan: Continue per plan of care  Progress treatment as tolerated         Diagnosis: Gross deconditioning/ gait abnormality   Precautions: Fall risk   Primary Goals: 1) improve LE strength 2) gait training 3) decrease R leg pain   *asterisks by exercise = given for HEP   Manuals                                                There Ex        Bike        Walking lap 1x through clinic w/RW pre session; 1x short lap w/RW post session       Standing endurance 5 mins at bar       Standing marches 2x10 ar bar       Standing HR 2x10 at bar       sidestepping 1 lap at bar       Standing abduction 2x10 ea at bar                       Neuro Re-Ed                                                                                                         Re-evaluation              Ther Act                                         Modalities

## 2021-07-13 ENCOUNTER — PATIENT OUTREACH (OUTPATIENT)
Dept: FAMILY MEDICINE CLINIC | Facility: CLINIC | Age: 76
End: 2021-07-13

## 2021-07-13 NOTE — PROGRESS NOTES
Returned call to [facility]  Outpatient PT  Questions regarding patients ability to make it to outpatient PT  Believes he might be safer having in home PT services  Referral to be sent to [facility] (OUTPATIENT CAMPUS)

## 2021-07-14 ENCOUNTER — OFFICE VISIT (OUTPATIENT)
Dept: PHYSICAL THERAPY | Facility: CLINIC | Age: 76
End: 2021-07-14
Payer: MEDICARE

## 2021-07-14 DIAGNOSIS — R53.81 PHYSICAL DECONDITIONING: Primary | ICD-10-CM

## 2021-07-14 DIAGNOSIS — J43.2 CENTRILOBULAR EMPHYSEMA (HCC): ICD-10-CM

## 2021-07-14 DIAGNOSIS — R26.9 GAIT DISORDER: ICD-10-CM

## 2021-07-14 PROCEDURE — 97112 NEUROMUSCULAR REEDUCATION: CPT | Performed by: PHYSICAL THERAPIST

## 2021-07-14 PROCEDURE — 97110 THERAPEUTIC EXERCISES: CPT | Performed by: PHYSICAL THERAPIST

## 2021-07-14 RX ORDER — BUDESONIDE 0.5 MG/2ML
0.5 INHALANT ORAL 2 TIMES DAILY
Qty: 120 ML | Refills: 5 | Status: SHIPPED | OUTPATIENT
Start: 2021-07-14 | End: 2021-10-18

## 2021-07-14 RX ORDER — FORMOTEROL FUMARATE DIHYDRATE 20 UG/2ML
20 SOLUTION RESPIRATORY (INHALATION) 2 TIMES DAILY
Qty: 120 ML | Refills: 5 | Status: SHIPPED | OUTPATIENT
Start: 2021-07-14 | End: 2021-10-18

## 2021-07-14 NOTE — PROGRESS NOTES
Daily Note     Today's date: 2021  Patient name: Celina Yoo  : 1945  MRN: [id number]  Referring provider: Alka Bender MD  Dx:   Encounter Diagnosis     ICD-10-CM    1  Physical deconditioning  R53 81    2  Gait disorder  R26 9        Start Time: 1400  Stop Time: 1445  Total time in clinic (min): 45 minutes    Subjective: Pt reports that he did not feel too tired following last session  Says that his R leg is bothering him yet  He enjoys attending outpatient physical therapy  Objective: See treatment diary below      Assessment: Pt tolerated treatment well and did demonstrate some improvement in endurance, requiring less seated rest breaks and also maintaining his SPO2 levels above 93 the whole session  He was on 5 L of O2 today during session  He had CGA the entire time he was standing and walking  He continues to be escorted via WC to and from his car  Plan: Continue per plan of care  Progress treatment as tolerated         Diagnosis: Gross deconditioning/ gait abnormality   Precautions: Fall risk   Primary Goals: 1) improve LE strength 2) gait training 3) decrease R leg pain   *asterisks by exercise = given for HEP   Manuals                                               There Ex        Bike  8 mins      Walking lap 1x through clinic w/RW pre session; 1x short lap w/RW post session 1x through clinic w/RW pre session, 1x short lap post session      Standing endurance 5 mins at bar       Standing marches 2x10 ar bar 2x10 at bar      Standing HR 2x10 at bar 2x10 at bar      sidestepping 1 lap at bar 2 laps at bar      Standing abduction 2x10 ea at bar 2x10 at bar                      Neuro Re-Ed        Dl stance on air-ex  1 min      Tandem stance  1 min ea LE lead                                                                                       Re-evaluation              Ther Act                                         Modalities

## 2021-07-20 ENCOUNTER — OFFICE VISIT (OUTPATIENT)
Dept: PHYSICAL THERAPY | Facility: CLINIC | Age: 76
End: 2021-07-20
Payer: MEDICARE

## 2021-07-20 DIAGNOSIS — R26.9 GAIT DISORDER: ICD-10-CM

## 2021-07-20 DIAGNOSIS — R53.81 PHYSICAL DECONDITIONING: Primary | ICD-10-CM

## 2021-07-20 PROCEDURE — 97112 NEUROMUSCULAR REEDUCATION: CPT | Performed by: PHYSICAL THERAPIST

## 2021-07-20 PROCEDURE — 97110 THERAPEUTIC EXERCISES: CPT | Performed by: PHYSICAL THERAPIST

## 2021-07-20 NOTE — PROGRESS NOTES
Daily Note     Today's date: 2021  Patient name: Carolann Gould  : 1945  MRN: [id number]  Referring provider: Leigh Ann Box MD  Dx:   Encounter Diagnosis     ICD-10-CM    1  Physical deconditioning  R53 81    2  Gait disorder  R26 9        Start Time: 1130  Stop Time: 1215  Total time in clinic (min): 45 minutes    Subjective: Pt reports that he is feeling about the same, no new updates in medical history  Says that when he gets up to walk he sometimes has to increase his O2 to 5L, but today he is at 3L  Objective: See treatment diary below      Assessment: Tolerated treatment well  Patient demonstrated fatigue post treatment and would benefit from continued PT  We focused on some standing balance today and also worked with the [facility] doing some karen gait training  He did require CGA as he was unsteady with this  He reported feeling fatigued throughout the session but it does seem that it endurance is improving overall  He still has some pain in his R leg  We will progress conditioning as able  Pt is helped in and out of the clinic to/from his car in a WC  Plan: Continue per plan of care  Progress treatment as tolerated         Diagnosis: Gross deconditioning/ gait abnormality   Precautions: Fall risk   Primary Goals: 1) improve LE strength 2) gait training 3) decrease R leg pain   *asterisks by exercise = given for HEP   Manuals                                              There Ex        Bike  8 mins 8 mins     Walking lap 1x through clinic w/RW pre session; 1x short lap w/RW post session 1x through clinic w/RW pre session, 1x short lap post session Short walk to with [facility]     Standing endurance 5 mins at bar       Standing marches 2x10 ar bar 2x10 at bar 2x10 at bar     Standing HR 2x10 at bar 2x10 at bar 2x10 at bar     sidestepping 1 lap at bar 2 laps at bar      Standing abduction 2x10 ea at bar 2x10 at bar 2x10 at bar                     Neuro Re-Ed        DL stance on air-ex  1 min 2 mins     Tandem stance  1 min ea LE lead 1 min ea LE lead     hurdles   2 laps w/cane                                                                              Re-evaluation              Ther Act                                         Modalities                                                   SPO2 93 pre session; 92-95 during session; 93 post session

## 2021-07-21 ENCOUNTER — OFFICE VISIT (OUTPATIENT)
Dept: PHYSICAL THERAPY | Facility: CLINIC | Age: 76
End: 2021-07-21
Payer: MEDICARE

## 2021-07-21 DIAGNOSIS — R26.9 GAIT DISORDER: ICD-10-CM

## 2021-07-21 DIAGNOSIS — R53.81 PHYSICAL DECONDITIONING: Primary | ICD-10-CM

## 2021-07-21 PROCEDURE — 97530 THERAPEUTIC ACTIVITIES: CPT | Performed by: PHYSICAL THERAPIST

## 2021-07-21 PROCEDURE — 97110 THERAPEUTIC EXERCISES: CPT | Performed by: PHYSICAL THERAPIST

## 2021-07-21 NOTE — PROGRESS NOTES
Daily Note     Today's date: 2021  Patient name: Ana Maria Mitchell  : 1945  MRN: [id number]  Referring provider: Mallory Sherman MD  Dx:   Encounter Diagnosis     ICD-10-CM    1  Physical deconditioning  R53 81    2  Gait disorder  R26 9        Start Time: 1015  Stop Time: 1100  Total time in clinic (min): 45 minutes    Subjective: Pt reports that he is feeling very fatigued today in general  Says that he did not sleep well last night but is unsure why  Objective: See treatment diary below      Assessment: Tolerated treatment fair  Patient demonstrated fatigue post treatment and would benefit from continued PT  Pt was able to complete 2 long laps through the clinic during session, followed by seated reset breaks  He did very well with the sit to stand exercise, and was able to perform with minimal to no use of his UE's from an elevated surface  He did have more fatigue today vs his session yesterday, but likely due to this being his second day of PT in a row  He notes that walking through his house is still challenging for him  We will progress as able  Plan: Continue per plan of care  Progress treatment as tolerated         Diagnosis: Gross deconditioning/ gait abnormality   Precautions: Fall risk   Primary Goals: 1) improve LE strength 2) gait training 3) decrease R leg pain   *asterisks by exercise = given for HEP   Manuals                                             There Ex        Bike  8 mins 8 mins 8 mins    Walking lap 1x through clinic w/RW pre session; 1x short lap w/RW post session 1x through clinic w/RW pre session, 1x short lap post session Short walk to [facility] with SPC 1x2 walk through clinic w/ RW     Standing endurance 5 mins at bar       Standing marches 2x10 ar bar 2x10 at bar 2x10 at bar 20x at bar    Standing HR 2x10 at bar 2x10 at bar 2x10 at bar 10x at bar    sidestepping 1 lap at bar 2 laps at bar      Standing abduction 2x10 ea at bar 2x10 at bar 2x10 at bar     Sit to stands    WC + 4" + air-ex 10x            Neuro Re-Ed        DL stance on air-ex  1 min 2 mins     Tandem stance  1 min ea LE lead 1 min ea LE lead     hurdles   2 laps w/cane                                                                              Re-evaluation              Ther Act                                         Modalities                                                   SPO2 94 pre session; 92-95 during session; 93 post session  Pt increased O2 level to 4L during session

## 2021-07-27 ENCOUNTER — OFFICE VISIT (OUTPATIENT)
Dept: PHYSICAL THERAPY | Facility: CLINIC | Age: 76
End: 2021-07-27
Payer: MEDICARE

## 2021-07-27 DIAGNOSIS — R53.81 PHYSICAL DECONDITIONING: Primary | ICD-10-CM

## 2021-07-27 DIAGNOSIS — R26.9 GAIT DISORDER: ICD-10-CM

## 2021-07-27 PROCEDURE — 97530 THERAPEUTIC ACTIVITIES: CPT | Performed by: PHYSICAL THERAPIST

## 2021-07-27 PROCEDURE — 97112 NEUROMUSCULAR REEDUCATION: CPT | Performed by: PHYSICAL THERAPIST

## 2021-07-27 NOTE — PROGRESS NOTES
Daily Note     Today's date: 2021  Patient name: Dimitri Swenson  : 1945  MRN: [id number]  Referring provider: Jerome Mccoy MD  Dx:   Encounter Diagnosis     ICD-10-CM    1  Physical deconditioning  R53 81    2  Gait disorder  R26 9        Start Time: 830  Stop Time: 915  Total time in clinic (min): 45 minutes    Subjective: Pt reports that he is feeling tired today and had a hard time getting out of bed  He offers no new complaints  He was fatigued following last session  Objective: See treatment diary below      Assessment: Tolerated treatment well  Patient demonstrated fatigue post treatment and would benefit from continued PT  Pt had increased levels of fatigue today and did require more frequent rest breaks  He required increased cues during standing and ambulation to lift his feet higher and take larger steps  We focused on some balance since he notes that this is what he struggles with at home  He still has a significant balance deficit  I helped him out of his car and into the clinic and back to his car via [facility]  Plan: Continue per plan of care  Progress treatment as tolerated         Diagnosis: Gross deconditioning/ gait abnormality   Precautions: Fall risk   Primary Goals: 1) improve LE strength 2) gait training 3) decrease R leg pain   *asterisks by exercise = given for HEP   Manuals                                            There Ex        Bike  8 mins 8 mins 8 mins 8 mins   Walking lap 1x through clinic w/RW pre session; 1x short lap w/RW post session 1x through clinic w/RW pre session, 1x short lap post session Short walk to [facility] with SPC 1x2 walk through clinic w/ RW  1x walk through clinic with RW   Standing endurance 5 mins at bar       Standing marches 2x10 ar bar 2x10 at bar 2x10 at bar 20x at bar    Standing HR 2x10 at bar 2x10 at bar 2x10 at bar 10x at bar    sidestepping 1 lap at bar 2 laps at bar      Standing abduction 2x10 ea at bar 2x10 at bar 2x10 at bar     Sit to stands    WC + 4" + air-ex 10x            Neuro Re-Ed        DL stance on air-ex  1 min 2 mins     Tandem stance  1 min ea LE lead 1 min ea LE lead  1 min ea LE lead, air-ex   hurdles   2 laps w/cane  2 laps w/cane/mirror bar   Air-ex beams     Sidestepping 1 lap                                                                    Re-evaluation              Ther Act                                         Modalities                                                   SPO2 95 pre session; 92-95 during session; 94 post session

## 2021-07-28 ENCOUNTER — OFFICE VISIT (OUTPATIENT)
Dept: PHYSICAL THERAPY | Facility: CLINIC | Age: 76
End: 2021-07-28
Payer: MEDICARE

## 2021-07-28 DIAGNOSIS — R26.9 GAIT DISORDER: ICD-10-CM

## 2021-07-28 DIAGNOSIS — R53.81 PHYSICAL DECONDITIONING: Primary | ICD-10-CM

## 2021-07-28 PROCEDURE — 97112 NEUROMUSCULAR REEDUCATION: CPT | Performed by: PHYSICAL THERAPIST

## 2021-07-28 PROCEDURE — 97530 THERAPEUTIC ACTIVITIES: CPT | Performed by: PHYSICAL THERAPIST

## 2021-07-28 NOTE — PROGRESS NOTES
Daily Note     Today's date: 2021  Patient name: Elle Horne  : 1945  MRN: [id number]  Referring provider: Ghazala Martinez MD  Dx:   Encounter Diagnosis     ICD-10-CM    1  Physical deconditioning  R53 81    2  Gait disorder  R26 9        Start Time: 1400  Stop Time: 1445  Total time in clinic (min): 45 minutes    Subjective: Pt reports that he is feeling "weak" today but that he did not feel too tired following yesterday's session  He offers no new complaints  Objective: See treatment diary below      Assessment: Tolerated treatment well  Patient demonstrated fatigue post treatment and would benefit from continued PT  We were able to add in some step ups in forward and lateral directions to the 4" step which he tolerated well  It does appear that his endurance is improving overall, with less seated rest breaks and better maintenance of SPO2, on only 3L of O2 during session  We continue to work on endurance and strength as well as balance  I will re-evaluate him at next session  I continue to assist him in and out of the clinic for safety and endurance reasons  Plan: Continue per plan of care  Progress note during next visit  Progress treatment as tolerated         Diagnosis: Gross deconditioning/ gait abnormality   Precautions: Fall risk   Primary Goals: 1) improve LE strength 2) gait training 3) decrease R leg pain   *asterisks by exercise = given for HEP   Manuals                                            There Ex        Bike 8 mins 8 mins 8 mins 8 mins 8 mins   Walking lap 1x through clinic w/RW 1x through clinic w/RW pre session, 1x short lap post session Short walk to [facility] with SPC 1x2 walk through clinic w/ RW  1x walk through clinic with RW   Standing endurance        Standing marches  2x10 at bar 2x10 at bar 20x at bar    Standing HR  2x10 at bar 2x10 at bar 10x at bar    sidestepping  2 laps at bar      Standing abduction  2x10 at bar 2x10 at bar     Sit to stands    WC + 4" + air-ex 10x    Step ups 4" 10x fwd/lat ea       Neuro Re-Ed        DL stance on air-ex 1 min, cues for upright posture 1 min 2 mins     Tandem stance 1 min ea LE lead 1 min ea LE lead 1 min ea LE lead  1 min ea LE lead, air-ex   hurdles   2 laps w/cane  2 laps w/cane/mirror bar   Air-ex beams     Sidestepping 1 lap                                                                    Re-evaluation              Ther Act                                         Modalities                                                   SPO2 95 pre session; 92-95 during session; 96 post session

## 2021-08-01 NOTE — ASSESSMENT & PLAN NOTE
BP log reviewed, stable  Pt to continue on amlodipine 10mg PO QD  Continue to monitor with PCP 
Continue finasteride 5 mg PO QD  Continue tamsulosin 0 4 mg PO QD  Continue to follow with PCP 
Continue following with SLP at home 
Continue gabapentin 300 mg PO TID  Pt notes feeling well on this regimen 
Continue home PT to improve functional status  Continue fall precautions at home, such as removing area rugs and keeping home well-lit 
Continue pantoprazole 40 mg PO BID  No s/sx at this time 
Continue zolpidem 10mg PO QHS  Risks of zolpidem including increased risks of falls discussed with pt  Pt notes understanding  Pt was sent home with remainder of zolpidem medication from [facility]  No new prescription given 
Follow-up PET scan with pulmonology/ PCP 
Pt notes last "good" BM was 1 week ago but that he had a small BM "the other day " Pt prescribed colace 100 mg PO BID prn for constipation and senna 8 6mg PO 2 tabs QHS prn for constipation 
Pt to continue following with pulmonology  Continue budesonide, formoterol, and prednisone taper  Pt is stable on  3L O2 
Pt to continue on 3L O2  Pt to continue following with pulmonology 
General

## 2021-08-02 DIAGNOSIS — I10 ESSENTIAL HYPERTENSION: Primary | ICD-10-CM

## 2021-08-02 RX ORDER — AMLODIPINE BESYLATE 10 MG/1
TABLET ORAL
Qty: 30 TABLET | Refills: 0 | Status: SHIPPED | OUTPATIENT
Start: 2021-08-02 | End: 2021-09-02

## 2021-08-03 ENCOUNTER — EVALUATION (OUTPATIENT)
Dept: PHYSICAL THERAPY | Facility: CLINIC | Age: 76
End: 2021-08-03
Payer: MEDICARE

## 2021-08-03 DIAGNOSIS — R26.9 GAIT DISORDER: ICD-10-CM

## 2021-08-03 DIAGNOSIS — R53.81 PHYSICAL DECONDITIONING: Primary | ICD-10-CM

## 2021-08-03 PROCEDURE — 97112 NEUROMUSCULAR REEDUCATION: CPT | Performed by: PHYSICAL THERAPIST

## 2021-08-03 PROCEDURE — 97110 THERAPEUTIC EXERCISES: CPT | Performed by: PHYSICAL THERAPIST

## 2021-08-03 PROCEDURE — 97140 MANUAL THERAPY 1/> REGIONS: CPT | Performed by: PHYSICAL THERAPIST

## 2021-08-03 NOTE — PROGRESS NOTES
PT Re-Evaluation     Today's date: 8/3/2021  Patient name: Nguyen Carlin  : 1945  MRN: [id number]  Referring provider: Angie Gasca MD  Dx:   Encounter Diagnosis     ICD-10-CM    1  Physical deconditioning  R53 81    2  Gait disorder  R26 9        Start Time: 1130  Stop Time: 1215  Total time in clinic (min): 45 minutes    Assessment  Assessment details: Nguyen Carlin has been compliant with attending PT since initial eval  Lebron Lee has made improvements in objective data since initial eval but continues to have limitations compared to prior level of function  Lebron Lee continues to have deficits in the above listed impairments and would benefit from additional skilled PT to address these deficits to return to prior level of function      Impairments: abnormal gait, abnormal or restricted ROM, activity intolerance, impaired balance, impaired physical strength, pain with function, safety issue, weight-bearing intolerance, poor posture  and poor body mechanics    Symptom irritability: highBarriers to therapy:    Understanding of Dx/Px/POC: fair   Prognosis: fair    Goals  (STG) Impairment Goals 4-6 weeks  - Decrease pain to 2/10 (progressing)  - Improve knee AROM to equal to the unaffected lower extremity (discharged)  - Increase knee strength to 4+/5 throughout (progressing)  - TUG time <by 10 seconds (met)  - TUG time < to 20" with RW (initial)    (LTG) Functional Goals 6-8 weeks  - Return to Prior Level of Function (progressing)  - Patient will be independent with HEP (progressing)  - Patient will be able to squat without increased pain/compensation/difficulty (progressing)  - Patient will be able to perform sit to stand independently, without increased pain/compensation/difficulty (progressing)  - Patient will be able to ascend and descend stairs without increased pain/compensation/difficulty (discharged)  - Patient will ambulate with LRAD with normal gait speed and no LOB (progressing)  - Patient will improve Tinetti score to 20/28 (initial)  - Patient will improve 6MWT by 50ft (initial)    Plan  Patient would benefit from: skilled physical therapy  Planned modality interventions: thermotherapy: hydrocollator packs and cryotherapy  Planned therapy interventions: abdominal trunk stabilization, balance, balance/weight bearing training, body mechanics training, flexibility, functional ROM exercises, gait training, home exercise program, transfer training, therapeutic exercise, therapeutic activities, stretching, strengthening, postural training, patient education and neuromuscular re-education  Frequency: 3x week  Duration in weeks: 8  Treatment plan discussed with: patient        Subjective Evaluation    History of Present Illness  Mechanism of injury: Initial evaluation:    HOME LIFE: Pt lives at home by himself  He has 2 stairs to get into his home through the front door and only one step to get in through the garage  PLOF:  Pt reports having multiple falls/week and using cane/walker for all ambulation  HISTORY OF CURRENT INJURY: Pt reports that a few months ago he did have some home physical therapy but this ended a few months ago but he ran out of visits and he has not received therapy in a few months now  Pt reports that he has not been in the hospital for a few months, he believes it was January  Pt reports that if he sits for too long, he cannot even pick his R leg up  He is also getting some pain in the R side of his neck  He reports that he uses his cane and walker throughout the day when he has to be on his feet  Says he has been using his cane/walker for about a year and a half, since he had a fall onto his R side  Says that his most recent fall was about 3 days ago  Says that his fall is usually due to lightheadedness vs loss of balance  He feels that his R leg is what limits him most when trying to walk and move  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     PAIN LOCATION/DESCRIPTORS: Pt reports having pain in his R leg and sometimes R neck  PATIENT GOALS: Pt would like to be able to walk better and to get rid of some of the pain in his R LE  Re-evaluation:  Pt feels that since he began PT, he is now able to walk around his house easier, he is able to stand for longer and he "feels that everything has improved a little"  Pt reports that he does not have difficulty with getting out of a chair because he has his chair heightened  He has some difficulty with getting in and out of bed  He feels that he would probably only be able to walk for about 10 minutes  He does feel that the pain in his R leg is improving, it is now 7/10 at worst, vs 10/10  He has not had any falls since beginning PT       Quality of life: good    Pain  Current pain ratin  At best pain ratin  At worst pain ratin  Location: pt reports pain begins in lower leg and radiates up towards his hip  Quality: throbbing and radiating  Relieving factors: rest    Social Support  Steps to enter house: yes  Stairs in house: no   Lives in: [facility]  Lives with: alone    Treatments  Previous treatment: physical therapy  Discharged from (in last 30 days): home health care  Patient Goals  Patient goals for therapy: improved balance  Patient goal: To be able to walk better and longer and to decrease R leg pain        Objective     Strength/Myotome Testing     Left Hip   Planes of Motion   Flexion: 4-    Right Hip   Planes of Motion   Flexion: 3-    Left Knee   Flexion: 3+ (painful)  Extension: 3+ (painful)    Right Knee   Flexion: 4-  Extension: 4-    Left Ankle/Foot   Dorsiflexion: 3+  Plantar flexion: 3+    Right Ankle/Foot   Dorsiflexion: 4-  Plantar flexion: 4-    Ambulation     Comments   Initial Evaluation:  Sit to stand- Min-ModA with use of bessie arm rests  Gait- slow shuffling with use of SPC, decreased step length, forefoot strike, slow speed with moderately kyphotic posture and decreased knee and hip extension (SpO2 level 91 following sit to stand with short walk)  TUG- 43 seconds with use of cane, CGA to get out of chair and during test (SpO2 96 post test)   strength: R23# L7#    Re-evaluation:  Sit to stand- CGA with use of bessie arm rests  Gait- slow shuffling with use of RW, decreased step length on R which becomes more notable as he fatigues, forefoot strike bessie, slow speed with moderately kyphotic posture and decreased knee and hip extension  TUG- 27 seconds with use of RW, CGA  Tinetti- 13/28   Strength R 32# L 4#  6MWT- 350ft w/RW                Diagnosis: Gross deconditioning/ gait abnormality   Precautions: Fall risk   Primary Goals: 1) improve LE strength 2) gait training 3) decrease R leg pain   *asterisks by exercise = given for HEP   Manuals 7/28 8/3 7/20 7/21 7/27                                           There Ex        Bike 8 mins  8 mins 8 mins 8 mins   Walking lap 1x through clinic w/RW  Short walk to [facility] with SPC 1x2 walk through clinic w/ RW  1x walk through clinic with RW   Standing endurance        Standing marches   2x10 at bar 20x at bar    Standing HR   2x10 at bar 10x at bar    sidestepping        Standing abduction   2x10 at bar     Sit to stands    WC + 4" + air-ex 10x    Step ups 4" 10x fwd/lat ea       Neuro Re-Ed        DL stance on air-ex 1 min, cues for upright posture  2 mins     Tandem stance 1 min ea LE lead  1 min ea LE lead  1 min ea LE lead, air-ex   hurdles   2 laps w/cane  2 laps w/cane/mirror bar   Air-ex beams     Sidestepping 1 lap                                                                    Re-evaluation   LCB          Ther Act                                      Modalities

## 2021-08-04 ENCOUNTER — OFFICE VISIT (OUTPATIENT)
Dept: PHYSICAL THERAPY | Facility: CLINIC | Age: 76
End: 2021-08-04
Payer: MEDICARE

## 2021-08-04 DIAGNOSIS — R53.81 PHYSICAL DECONDITIONING: Primary | ICD-10-CM

## 2021-08-04 DIAGNOSIS — R26.9 GAIT DISORDER: ICD-10-CM

## 2021-08-04 PROCEDURE — 97110 THERAPEUTIC EXERCISES: CPT | Performed by: PHYSICAL THERAPIST

## 2021-08-04 NOTE — PROGRESS NOTES
Daily Note     Today's date: 2021  Patient name: Nguyen Carlin  : 1945  MRN: [id number]  Referring provider: Angie Gasca MD  Dx:   Encounter Diagnosis     ICD-10-CM    1  Physical deconditioning  R53 81    2  Gait disorder  R26 9        Start Time: 08  Stop Time: 0910  Total time in clinic (min): 25 minutes    Subjective: Pt reports that he was fatigued following yesterday's session  Says that he is not feeling too great today, with an upset stomach  Objective: See treatment diary below      Assessment: Tolerated treatment fair  Patient demonstrated fatigue post treatment and would benefit from continued PT  Session was ended early per pt request, as he was not feeling well and became nauseous  We will resume full exercise set at next session if able  Plan: Continue per plan of care  Progress treatment as tolerated         Diagnosis: Gross deconditioning/ gait abnormality   Precautions: Fall risk   Primary Goals: 1) improve LE strength 2) gait training 3) decrease R leg pain   *asterisks by exercise = given for HEP   Manuals 7/28 8/3 8/4 7/21 7/27                                           There Ex        Bike 8 mins  8 mins 8 mins 8 mins   Walking lap 1x through clinic w/RW  1x through clinic 1x2 walk through clinic w/ RW  1x walk through clinic with RW   Standing endurance        Standing marches    20x at bar    Standing HR    10x at bar    sidestepping        Standing abduction        Sit to stands    WC + 4" + air-ex 10x    Step ups 4" 10x fwd/lat ea       Neuro Re-Ed        DL stance on air-ex 1 min, cues for upright posture       Tandem stance 1 min ea LE lead    1 min ea LE lead, air-ex   hurdles     2 laps w/cane/mirror bar   Air-ex beams     Sidestepping 1 lap                                                                    Re-evaluation   LCB         Ther Act                                   Modalities

## 2021-08-06 ENCOUNTER — OFFICE VISIT (OUTPATIENT)
Dept: PHYSICAL THERAPY | Facility: CLINIC | Age: 76
End: 2021-08-06
Payer: MEDICARE

## 2021-08-06 ENCOUNTER — PATIENT OUTREACH (OUTPATIENT)
Dept: FAMILY MEDICINE CLINIC | Facility: CLINIC | Age: 76
End: 2021-08-06

## 2021-08-06 DIAGNOSIS — R53.81 PHYSICAL DECONDITIONING: Primary | ICD-10-CM

## 2021-08-06 DIAGNOSIS — R26.9 GAIT DISORDER: ICD-10-CM

## 2021-08-06 PROCEDURE — 97110 THERAPEUTIC EXERCISES: CPT | Performed by: PHYSICAL THERAPIST

## 2021-08-06 PROCEDURE — 97530 THERAPEUTIC ACTIVITIES: CPT | Performed by: PHYSICAL THERAPIST

## 2021-08-06 NOTE — PROGRESS NOTES
Daily Note     Today's date: 2021  Patient name: Rigo Farr  : 1945  MRN: [id number]  Referring provider: Som Nielsen MD  Dx:   Encounter Diagnosis     ICD-10-CM    1  Physical deconditioning  R53 81    2  Gait disorder  R26 9        Start Time: 915  Stop Time: 1000  Total time in clinic (min): 45 minutes    Subjective: Pt reports that he is feeling a bit better today overall  Says that he is just tired  He was fatigued following last session  Objective: See treatment diary below      Assessment: Tolerated treatment well  Patient demonstrated fatigue post treatment and would benefit from continued PT  Pt was able to walk into the clinic from his car today for the first time, with RW  He was also able to walk back to his car post-session  He was fatigued following both of these walks  He is demonstrating significant increase in endurance by being able to do this, as well as exercises during session  We focused on strengthening through step ups and standing exercises  We will continue to progress as able  Plan: Continue per plan of care  Progress treatment as tolerated         Diagnosis: Gross deconditioning/ gait abnormality   Precautions: Fall risk   Primary Goals: 1) improve LE strength 2) gait training 3) decrease R leg pain   *asterisks by exercise = given for HEP   Manuals 7/28 8/3 8/6 7/21 7/27                                           There Ex        Bike 8 mins   8 mins 8 mins   Walking lap 1x through clinic w/RW  Walked from car into clinic; walked from clinic back to car post-session 1x2 walk through clinic w/ RW  1x walk through clinic with RW   Standing endurance        Standing marches   2x10 20x at bar    Standing HR   2x10 10x at bar    sidestepping        Standing hip extension   2x10 ea     Standing abduction   2x10 ea     Sit to stands    WC + 4" + air-ex 10x    Step ups 4" 10x fwd/lat ea  6" 10x fwd, ea; 8x lat up/over     Neuro Re-Ed        DL stance on air-ex 1 min, cues for upright posture       Tandem stance 1 min ea LE lead    1 min ea LE lead, air-ex   hurdles     2 laps w/cane/mirror bar   Air-ex beams     Sidestepping 1 lap                                                                    Re-evaluation   LCB         Ther Act                                   Modalities

## 2021-08-08 ENCOUNTER — HOSPITAL ENCOUNTER (EMERGENCY)
Facility: HOSPITAL | Age: 76
Discharge: HOME | End: 2021-08-08
Attending: EMERGENCY MEDICINE | Admitting: EMERGENCY MEDICINE
Payer: MEDICARE

## 2021-08-08 ENCOUNTER — APPOINTMENT (EMERGENCY)
Dept: RADIOLOGY | Facility: HOSPITAL | Age: 76
End: 2021-08-08
Payer: MEDICARE

## 2021-08-08 VITALS
RESPIRATION RATE: 22 BRPM | DIASTOLIC BLOOD PRESSURE: 78 MMHG | SYSTOLIC BLOOD PRESSURE: 146 MMHG | OXYGEN SATURATION: 99 % | HEART RATE: 66 BPM | TEMPERATURE: 97.7 F

## 2021-08-08 DIAGNOSIS — J44.1 COPD EXACERBATION (HCC): Primary | ICD-10-CM

## 2021-08-08 LAB
ALBUMIN SERPL BCP-MCNC: 4 G/DL (ref 3.5–5)
ALP SERPL-CCNC: 76 U/L (ref 46–116)
ALT SERPL W P-5'-P-CCNC: 31 U/L (ref 12–78)
ANION GAP SERPL CALCULATED.3IONS-SCNC: 9 MMOL/L (ref 4–13)
AST SERPL W P-5'-P-CCNC: 24 U/L (ref 5–45)
BASOPHILS # BLD AUTO: 0.04 THOUSANDS/ΜL (ref 0–0.1)
BASOPHILS NFR BLD AUTO: 1 % (ref 0–1)
BILIRUB SERPL-MCNC: 1.35 MG/DL (ref 0.2–1)
BUN SERPL-MCNC: 13 MG/DL (ref 5–25)
CALCIUM SERPL-MCNC: 9.2 MG/DL (ref 8.3–10.1)
CHLORIDE SERPL-SCNC: 104 MMOL/L (ref 100–108)
CO2 SERPL-SCNC: 26 MMOL/L (ref 21–32)
CREAT SERPL-MCNC: 1.2 MG/DL (ref 0.6–1.3)
EOSINOPHIL # BLD AUTO: 0.05 THOUSAND/ΜL (ref 0–0.61)
EOSINOPHIL NFR BLD AUTO: 1 % (ref 0–6)
ERYTHROCYTE [DISTWIDTH] IN BLOOD BY AUTOMATED COUNT: 13.2 % (ref 11.6–15.1)
GFR SERPL CREATININE-BSD FRML MDRD: 58 ML/MIN/1.73SQ M
GLUCOSE SERPL-MCNC: 115 MG/DL (ref 65–140)
HCT VFR BLD AUTO: 41.2 % (ref 36.5–49.3)
HGB BLD-MCNC: 13.7 G/DL (ref 12–17)
IMM GRANULOCYTES # BLD AUTO: 0.02 THOUSAND/UL (ref 0–0.2)
IMM GRANULOCYTES NFR BLD AUTO: 0 % (ref 0–2)
LACTATE SERPL-SCNC: 1.8 MMOL/L (ref 0.5–2)
LYMPHOCYTES # BLD AUTO: 1.02 THOUSANDS/ΜL (ref 0.6–4.47)
LYMPHOCYTES NFR BLD AUTO: 18 % (ref 14–44)
MCH RBC QN AUTO: 31.6 PG (ref 26.8–34.3)
MCHC RBC AUTO-ENTMCNC: 33.3 G/DL (ref 31.4–37.4)
MCV RBC AUTO: 95 FL (ref 82–98)
MONOCYTES # BLD AUTO: 0.18 THOUSAND/ΜL (ref 0.17–1.22)
MONOCYTES NFR BLD AUTO: 3 % (ref 4–12)
NEUTROPHILS # BLD AUTO: 4.37 THOUSANDS/ΜL (ref 1.85–7.62)
NEUTS SEG NFR BLD AUTO: 77 % (ref 43–75)
NRBC BLD AUTO-RTO: 0 /100 WBCS
PLATELET # BLD AUTO: 133 THOUSANDS/UL (ref 149–390)
PMV BLD AUTO: 8.8 FL (ref 8.9–12.7)
POTASSIUM SERPL-SCNC: 4.1 MMOL/L (ref 3.5–5.3)
PROT SERPL-MCNC: 7.1 G/DL (ref 6.4–8.2)
RBC # BLD AUTO: 4.34 MILLION/UL (ref 3.88–5.62)
SODIUM SERPL-SCNC: 139 MMOL/L (ref 136–145)
WBC # BLD AUTO: 5.68 THOUSAND/UL (ref 4.31–10.16)

## 2021-08-08 PROCEDURE — 80053 COMPREHEN METABOLIC PANEL: CPT | Performed by: EMERGENCY MEDICINE

## 2021-08-08 PROCEDURE — 99285 EMERGENCY DEPT VISIT HI MDM: CPT | Performed by: EMERGENCY MEDICINE

## 2021-08-08 PROCEDURE — 93005 ELECTROCARDIOGRAM TRACING: CPT

## 2021-08-08 PROCEDURE — 36415 COLL VENOUS BLD VENIPUNCTURE: CPT | Performed by: EMERGENCY MEDICINE

## 2021-08-08 PROCEDURE — 87040 BLOOD CULTURE FOR BACTERIA: CPT | Performed by: EMERGENCY MEDICINE

## 2021-08-08 PROCEDURE — 85025 COMPLETE CBC W/AUTO DIFF WBC: CPT | Performed by: EMERGENCY MEDICINE

## 2021-08-08 PROCEDURE — 83605 ASSAY OF LACTIC ACID: CPT | Performed by: EMERGENCY MEDICINE

## 2021-08-08 PROCEDURE — 99285 EMERGENCY DEPT VISIT HI MDM: CPT

## 2021-08-08 PROCEDURE — 71045 X-RAY EXAM CHEST 1 VIEW: CPT

## 2021-08-08 RX ORDER — PREDNISONE 20 MG/1
40 TABLET ORAL DAILY
Qty: 10 TABLET | Refills: 0 | Status: SHIPPED | OUTPATIENT
Start: 2021-08-08 | End: 2021-08-13

## 2021-08-08 RX ORDER — METHYLPREDNISOLONE SOD SUCC 125 MG
1 VIAL (EA) INJECTION ONCE
Status: COMPLETED | OUTPATIENT
Start: 2021-08-08 | End: 2021-08-08

## 2021-08-08 RX ORDER — IPRATROPIUM BROMIDE AND ALBUTEROL SULFATE 2.5; .5 MG/3ML; MG/3ML
3 SOLUTION RESPIRATORY (INHALATION) ONCE
Status: COMPLETED | OUTPATIENT
Start: 2021-08-08 | End: 2021-08-08

## 2021-08-08 RX ADMIN — IPRATROPIUM BROMIDE AND ALBUTEROL SULFATE 3 ML: 2.5; .5 SOLUTION RESPIRATORY (INHALATION) at 12:02

## 2021-08-08 RX ADMIN — IPRATROPIUM BROMIDE AND ALBUTEROL SULFATE 3 ML: 2.5; .5 SOLUTION RESPIRATORY (INHALATION) at 12:03

## 2021-08-08 NOTE — ED PROCEDURE NOTE
PROCEDURE  ECG 12 Lead Documentation Only    Date/Time: 8/8/2021 11:41 AM  Performed by: Kenny Ventura DO  Authorized by: Kenny Ventura DO     ECG reviewed by me, the ED Provider: yes    Patient location:  ED  Interpretation:     Interpretation: abnormal    Rate:     ECG rate:  71    ECG rate assessment: normal    Rhythm:     Rhythm: sinus rhythm    Ectopy:     Ectopy: none    ST segments:     ST segments:  Normal  T waves:     T waves: normal    Q waves:     Q waves:  III and aVF         Kenny Ventura DO  08/08/21 1142

## 2021-08-08 NOTE — ED PROVIDER NOTES
History  Chief Complaint   Patient presents with    Breathing Difficulty     Started last night  Became worse this morning  Patient presents for evaluation of shortness of breath starting yesterday  Symptoms worsened this morning  No fever chills  No known sick contacts  Patient is fully vaccinated for COVID  Patient using nebulizer home with little relief  Patient received DuoNeb and Solu-Medrol by EMS prior to arrival       History provided by:  Patient   used: No        Prior to Admission Medications   Prescriptions Last Dose Informant Patient Reported? Taking? Diclofenac Sodium (VOLTAREN) 1 %  Self No No   Sig: Apply 2 g topically 4 (four) times a day Apply to R knee   LORazepam (ATIVAN) 1 mg tablet   No No   Sig: TAKE ONE TABLET BY MOUTH TWICE DAILY AS NEEDED FOR ANXIETY   OLANZapine (ZyPREXA) 10 mg tablet   No No   Sig: Take 1 tablet (10 mg total) by mouth daily at bedtime   OXYGEN-HELIUM IN  Self Yes No   Sig: Inhale 2L at rest- 3L with activity   Ventolin  (90 Base) MCG/ACT inhaler  Self No No   Sig: Inhale 1 puff every 6 (six) hours as needed for shortness of breath   ZEMAIRA infusion  Self Yes No   Sig: once a week    acetaminophen (TYLENOL) 500 mg tablet  Self No No   Sig: Take 2 tablets (1,000 mg total) by mouth every 8 (eight) hours   amLODIPine (NORVASC) 10 mg tablet   No No   Sig: TAKE ONE TABLET BY MOUTH DAILY    budesonide (PULMICORT) 0 5 mg/2 mL nebulizer solution   No No   Sig: Take 2 mL (0 5 mg total) by nebulization 2 (two) times a day Rinse mouth after use     busPIRone (BUSPAR) 10 mg tablet   No No   Sig: TAKE ONE TABLET BY MOUTH THREE TIMES DAILY    cholestyramine sugar free (QUESTRAN LIGHT) 4 g packet  Self No No   Sig: Take 1 packet (4 g total) by mouth 2 (two) times a day   doxylamine (Unisom SleepTabs) 25 MG tablet  Self Yes No   Sig: Take 25 mg by mouth daily at bedtime as needed for sleep   finasteride (PROSCAR) 5 mg tablet  Self No No   Sig: Take 1 tablet (5 mg total) by mouth every morning   fluticasone (FLONASE) 50 mcg/act nasal spray  Self No No   Si sprays into each nostril daily   formoterol (Perforomist) 20 MCG/2ML nebulizer solution   No No   Sig: Take 2 mL (20 mcg total) by nebulization 2 (two) times a day   gabapentin (NEURONTIN) 300 mg capsule   No No   Sig: TAKE ONE CAPSULE BY MOUTH THREE TIMES DAILY    guaiFENesin (ROBITUSSIN) 100 MG/5ML oral liquid  Self Yes No   Sig: Take 200 mg by mouth 2 (two) times a day   ipratropium-albuterol (DUO-NEB) 0 5-2 5 mg/3 mL nebulizer solution   No No   Sig: inhale contents of 1 vial via nebulizer 4 times a day as needed   midodrine (PROAMATINE) 2 5 mg tablet  Self No No   Sig: Take 1 tablet (2 5 mg total) by mouth 3 (three) times a day   pantoprazole (PROTONIX) 40 mg tablet  Self No No   Sig: Take 1 tablet (40 mg total) by mouth 2 (two) times a day   tamsulosin (FLOMAX) 0 4 mg  Self Yes No   Sig: Take 0 4 mg by mouth daily   traZODone (DESYREL) 150 mg tablet   No No   Sig: Take 1 tablet (150 mg total) by mouth daily at bedtime   triamterene-hydrochlorothiazide (DYAZIDE) 37 5-25 mg per capsule  Self No No   Sig: Take 1 capsule by mouth every morning 1 daily for 3 days when needed for edema      Facility-Administered Medications: None       Past Medical History:   Diagnosis Date    Anesthesia complication     Difficult to wake up    Arthritis     BPH (benign prostatic hyperplasia)     urinary frequency    Cancer (HCC)     basal cell neck, face    COPD (chronic obstructive pulmonary disease) (HCC)     COPD exacerbation (HCC) 2019    Full dentures     Hiatal hernia     History of methicillin resistant staphylococcus aureus (MRSA)     10/11/2018 MRSA (nares) positive    Hypertension     Irritable bowel syndrome     Kidney stone     at least 7 episodes    Liver disease     Alpha 1- enzyme deficiency - diagnosed    has been on weekly replacement therapy since then    Pulmonary emphysema ([facility] [address] ) 1/25/15  FEV1 - 2 45 liters or 59% of predicted    RSV infection 12/2017    Wears glasses     for driving only       Past Surgical History:   Procedure Laterality Date    BACK SURGERY  2008    discectomy    COLONOSCOPY      COLONOSCOPY N/A 3/10/2017    Procedure: Rehana Livingstonnce;  Surgeon: Osmin Martinez MD;  Location: [facility] GI LAB; Service:    Phylliss Pipe      removal of kidney stones    ESOPHAGOGASTRODUODENOSCOPY N/A 3/10/2017    Procedure: ESOPHAGOGASTRODUODENOSCOPY (EGD); Surgeon: Osmin Martinez MD;  Location: [facility] GI LAB; Service:     LITHOTRIPSY      [place] ESOPHAGOGASTRODUODENOSCOPY TRANSORAL DIAGNOSTIC N/A 11/20/2018    Procedure: ESOPHAGOGASTRODUODENOSCOPY (EGD); Surgeon: Osmin Martinez MD;  Location: [facility] GI LAB; Service: Gastroenterology    TONSILLECTOMY      VEIN LIGATION AND STRIPPING Bilateral     17's       Family History   Problem Relation Age of Onset    Emphysema Mother         never smoked    Emphysema Father     Cancer Brother         colon    Colon cancer Brother     Ulcerative colitis Family     Liver disease Family      I have reviewed and agree with the history as documented  E-Cigarette/Vaping    E-Cigarette Use Never User      E-Cigarette/Vaping Substances    Nicotine No     THC No     CBD No     Flavoring No     Other No     Unknown No      Social History     Tobacco Use    Smoking status: Former Smoker     Packs/day: 1 00     Years: 60 00     Pack years: 60 00     Quit date: 8/31/2017     Years since quitting: 3 9    Smokeless tobacco: Never Used    Tobacco comment: quit in august 2017   Vaping Use    Vaping Use: Never used   Substance Use Topics    Alcohol use: Not Currently     Comment: stopped in 2009    Drug use: Not Currently       Review of Systems   Constitutional: Negative for chills and fever  HENT: Negative for congestion, ear pain and sore throat  Eyes: Negative for pain and visual disturbance     Respiratory: Positive for cough, shortness of breath and wheezing  Cardiovascular: Negative for chest pain and palpitations  Gastrointestinal: Negative for abdominal pain and vomiting  Genitourinary: Negative for dysuria and hematuria  Musculoskeletal: Negative for arthralgias and back pain  Skin: Negative for color change and rash  Neurological: Negative for seizures and syncope  All other systems reviewed and are negative  Physical Exam  Physical Exam  Vitals and nursing note reviewed  Constitutional:       General: He is in acute distress  HENT:      Head: Atraumatic  Right Ear: External ear normal       Left Ear: External ear normal       Nose: Nose normal  No congestion  Mouth/Throat:      Mouth: Mucous membranes are moist       Pharynx: Oropharynx is clear  Eyes:      General: No scleral icterus  Conjunctiva/sclera: Conjunctivae normal    Cardiovascular:      Rate and Rhythm: Normal rate and regular rhythm  Pulses: Normal pulses  Pulmonary:      Effort: Respiratory distress present  Breath sounds: Wheezing present  Comments: Mild respiratory distress with expiratory wheezing bilaterally  Abdominal:      General: Abdomen is flat  Bowel sounds are normal  There is no distension  Palpations: Abdomen is soft  Tenderness: There is no abdominal tenderness  There is no guarding or rebound  Musculoskeletal:         General: No deformity  Normal range of motion  Skin:     Capillary Refill: Capillary refill takes less than 2 seconds  Findings: No rash  Neurological:      General: No focal deficit present  Mental Status: He is alert and oriented to person, place, and time           Vital Signs  ED Triage Vitals   Temperature Pulse Respirations Blood Pressure SpO2   08/08/21 1059 08/08/21 1052 08/08/21 1052 08/08/21 1052 08/08/21 1052   97 7 °F (36 5 °C) 79 22 165/80 98 %      Temp src Heart Rate Source Patient Position - Orthostatic VS BP Location FiO2 (%)   -- 08/08/21 1052 08/08/21 1052 08/08/21 1052 --    Monitor Lying Right arm       Pain Score       --                  Vitals:    08/08/21 1052 08/08/21 1245   BP: 165/80 146/78   Pulse: 79 66   Patient Position - Orthostatic VS: Lying          Visual Acuity      ED Medications  Medications   methylPREDNISolone sodium succinate (FOR EMS ONLY) (Solu-MEDROL) 125 MG injection 125 mg (0 mg Does not apply Given to EMS 8/8/21 1203)   ipratropium-albuterol (DUO-NEB) 0 5-2 5 mg/3 mL inhalation solution 3 mL (3 mL Nebulization Given 8/8/21 1202)   ipratropium-albuterol (DUO-NEB) 0 5-2 5 mg/3 mL inhalation solution 3 mL (3 mL Nebulization Given 8/8/21 1203)       Diagnostic Studies  Results Reviewed     Procedure Component Value Units Date/Time    Lactic acid [[id number]]  (Normal) Collected: 08/08/21 1216    Lab Status: Final result Specimen: Blood from Arm, Right Updated: 08/08/21 1245     LACTIC ACID 1 8 mmol/L     Narrative:      Result may be elevated if tourniquet was used during collection      Comprehensive metabolic panel [[id number]]  (Abnormal) Collected: 08/08/21 1216    Lab Status: Final result Specimen: Blood from Arm, Right Updated: 08/08/21 1240     Sodium 139 mmol/L      Potassium 4 1 mmol/L      Chloride 104 mmol/L      CO2 26 mmol/L      ANION GAP 9 mmol/L      BUN 13 mg/dL      Creatinine 1 20 mg/dL      Glucose 115 mg/dL      Calcium 9 2 mg/dL      AST 24 U/L      ALT 31 U/L      Alkaline Phosphatase 76 U/L      Total Protein 7 1 g/dL      Albumin 4 0 g/dL      Total Bilirubin 1 35 mg/dL      eGFR 58 ml/min/1 73sq m     Narrative:      [place] guidelines for Chronic Kidney Disease (CKD):     Stage 1 with normal or high GFR (GFR > 90 mL/min/1 73 square meters)    Stage 2 Mild CKD (GFR = 60-89 mL/min/1 73 square meters)    Stage 3A Moderate CKD (GFR = 45-59 mL/min/1 73 square meters)    Stage 3B Moderate CKD (GFR = 30-44 mL/min/1 73 square meters)    Stage 4 Severe CKD (GFR = 15-29 mL/min/1 73 square meters)    Stage 5 End Stage CKD (GFR <15 mL/min/1 73 square meters)  Note: GFR calculation is accurate only with a steady state creatinine    CBC and differential [[id number]]  (Abnormal) Collected: 08/08/21 1216    Lab Status: Final result Specimen: Blood from Arm, Right Updated: 08/08/21 1225     WBC 5 68 Thousand/uL      RBC 4 34 Million/uL      Hemoglobin 13 7 g/dL      Hematocrit 41 2 %      MCV 95 fL      MCH 31 6 pg      MCHC 33 3 g/dL      RDW 13 2 %      MPV 8 8 fL      Platelets 047 Thousands/uL      nRBC 0 /100 WBCs      Neutrophils Relative 77 %      Immat GRANS % 0 %      Lymphocytes Relative 18 %      Monocytes Relative 3 %      Eosinophils Relative 1 %      Basophils Relative 1 %      Neutrophils Absolute 4 37 Thousands/µL      Immature Grans Absolute 0 02 Thousand/uL      Lymphocytes Absolute 1 02 Thousands/µL      Monocytes Absolute 0 18 Thousand/µL      Eosinophils Absolute 0 05 Thousand/µL      Basophils Absolute 0 04 Thousands/µL     Blood culture #1 [[id number]] Collected: 08/08/21 1212    Lab Status: In process Specimen: Blood from Arm, Right Updated: 08/08/21 1221    Blood culture #2 [[id number]] Collected: 08/08/21 1216    Lab Status: In process Specimen: Blood from Hand, Left Updated: 08/08/21 1221                 XR chest 1 view portable    (Results Pending)              Procedures  Procedures         ED Course                                           MDM  Number of Diagnoses or Management Options  COPD exacerbation ([facility] [address] )  Diagnosis management comments: Pulse ox 99% on nasal cannula indicating adequate oxygenation  CXR: NAD as read by me    On repeat exam patient had improvement in his symptoms resting comfortably no respiratory distress maintaining oxygen his sats on home nasal cannula dose  Will prescribe oral steroids and patient follow-up as outpatient return if symptoms worsen         Amount and/or Complexity of Data Reviewed  Clinical lab tests: reviewed and ordered  Tests in the radiology section of CPT®: ordered and reviewed  Decide to obtain previous medical records or to obtain history from someone other than the patient: yes  Review and summarize past medical records: yes  Independent visualization of images, tracings, or specimens: yes    Patient Progress  Patient progress: improved      Disposition  Final diagnoses:   COPD exacerbation ([address] )     Time reflects when diagnosis was documented in both MDM as applicable and the Disposition within this note     Time User Action Codes Description Comment    8/8/2021  1:00 PM Savanna Tavera Add [J44 1] COPD exacerbation [facility])       ED Disposition     ED Disposition Condition Date/Time Comment    Discharge Stable [place] Aug 8, 2021  1:00 PM Macie Marcelo discharge to home/self care              Follow-up Information     Follow up With Specialties Details Why Contact Info    Olga Finch MD Family Medicine In 3 days  [phone] [id number]  [phone]            Discharge Medication List as of 8/8/2021  1:01 PM      START taking these medications    Details   predniSONE 20 mg tablet Take 2 tablets (40 mg total) by mouth daily for 5 days, Starting Sun 8/8/2021, Until Fri 8/13/2021, Normal         CONTINUE these medications which have NOT CHANGED    Details   acetaminophen (TYLENOL) 500 mg tablet Take 2 tablets (1,000 mg total) by mouth every 8 (eight) hours, Starting Tue 2/9/2021, Normal      amLODIPine (NORVASC) 10 mg tablet TAKE ONE TABLET BY MOUTH DAILY , Normal      budesonide (PULMICORT) 0 5 mg/2 mL nebulizer solution Take 2 mL (0 5 mg total) by nebulization 2 (two) times a day Rinse mouth after use , Starting Wed 7/14/2021, Normal      busPIRone (BUSPAR) 10 mg tablet TAKE ONE TABLET BY MOUTH THREE TIMES DAILY , Normal      cholestyramine sugar free (QUESTRAN LIGHT) 4 g packet Take 1 packet (4 g total) by mouth 2 (two) times a day, Starting Tue 2/9/2021, Normal      Diclofenac Sodium (VOLTAREN) 1 % Apply 2 g topically 4 (four) times a day Apply to R knee, Starting Tue 2/9/2021, Normal      doxylamine (Unisom SleepTabs) 25 MG tablet Take 25 mg by mouth daily at bedtime as needed for sleep, Historical Med      finasteride (PROSCAR) 5 mg tablet Take 1 tablet (5 mg total) by mouth every morning, Starting Thu 9/24/2020, Normal      fluticasone (FLONASE) 50 mcg/act nasal spray 2 sprays into each nostril daily, Starting Mon 7/6/2020, Normal      formoterol (Perforomist) 20 MCG/2ML nebulizer solution Take 2 mL (20 mcg total) by nebulization 2 (two) times a day, Starting Wed 7/14/2021, Normal      gabapentin (NEURONTIN) 300 mg capsule TAKE ONE CAPSULE BY MOUTH THREE TIMES DAILY , Normal      guaiFENesin (ROBITUSSIN) 100 MG/5ML oral liquid Take 200 mg by mouth 2 (two) times a day, Historical Med      ipratropium-albuterol (DUO-NEB) 0 5-2 5 mg/3 mL nebulizer solution inhale contents of 1 vial via nebulizer 4 times a day as needed, Normal      LORazepam (ATIVAN) 1 mg tablet TAKE ONE TABLET BY MOUTH TWICE DAILY AS NEEDED FOR ANXIETY, Normal      midodrine (PROAMATINE) 2 5 mg tablet Take 1 tablet (2 5 mg total) by mouth 3 (three) times a day, Starting Mon 1/4/2021, Normal      OLANZapine (ZyPREXA) 10 mg tablet Take 1 tablet (10 mg total) by mouth daily at bedtime, Starting Tue 6/8/2021, Normal      OXYGEN-HELIUM IN Inhale 2L at rest- 3L with activity, Historical Med      pantoprazole (PROTONIX) 40 mg tablet Take 1 tablet (40 mg total) by mouth 2 (two) times a day, Starting Wed 11/4/2020, Normal      tamsulosin (FLOMAX) 0 4 mg Take 0 4 mg by mouth daily, Historical Med      traZODone (DESYREL) 150 mg tablet Take 1 tablet (150 mg total) by mouth daily at bedtime, Starting Tue 6/8/2021, Normal      triamterene-hydrochlorothiazide (DYAZIDE) 37 5-25 mg per capsule Take 1 capsule by mouth every morning 1 daily for 3 days when needed for edema, Starting Thu 5/13/2021, Normal      Ventolin  (90 Base) MCG/ACT inhaler Inhale 1 puff every 6 (six) hours as needed for shortness of breath, Starting Thu 9/24/2020, Normal      ZEMAIRA infusion once a week , Starting Thu 12/26/2019, Historical Med           No discharge procedures on file      PDMP Review       Value Time User    PDMP Reviewed  Yes 6/8/2021  9:04 AM Analy Hussein MD          ED Provider  Electronically Signed by           Kenny Ventura DO  08/08/21 [id number]

## 2021-08-10 ENCOUNTER — OFFICE VISIT (OUTPATIENT)
Dept: PALLIATIVE MEDICINE | Facility: CLINIC | Age: 76
End: 2021-08-10
Payer: MEDICARE

## 2021-08-10 VITALS
SYSTOLIC BLOOD PRESSURE: 132 MMHG | BODY MASS INDEX: 21.32 KG/M2 | WEIGHT: 179.8 LBS | RESPIRATION RATE: 18 BRPM | HEART RATE: 78 BPM | OXYGEN SATURATION: 94 % | TEMPERATURE: 97.9 F | DIASTOLIC BLOOD PRESSURE: 65 MMHG

## 2021-08-10 DIAGNOSIS — E88.01 AAT (ALPHA-1-ANTITRYPSIN) DEFICIENCY (HCC): Chronic | ICD-10-CM

## 2021-08-10 DIAGNOSIS — J96.11 CHRONIC RESPIRATORY FAILURE WITH HYPOXIA (HCC): Chronic | ICD-10-CM

## 2021-08-10 DIAGNOSIS — F41.9 ANXIOUSNESS: ICD-10-CM

## 2021-08-10 DIAGNOSIS — K21.9 GASTROESOPHAGEAL REFLUX DISEASE, UNSPECIFIED WHETHER ESOPHAGITIS PRESENT: ICD-10-CM

## 2021-08-10 DIAGNOSIS — I87.2 CHRONIC VENOUS INSUFFICIENCY: ICD-10-CM

## 2021-08-10 DIAGNOSIS — Z51.5 PALLIATIVE CARE PATIENT: ICD-10-CM

## 2021-08-10 DIAGNOSIS — R26.81 UNSTEADY GAIT: Chronic | ICD-10-CM

## 2021-08-10 DIAGNOSIS — I27.82 OTHER CHRONIC PULMONARY EMBOLISM WITHOUT ACUTE COR PULMONALE (HCC): ICD-10-CM

## 2021-08-10 DIAGNOSIS — G47.00 INSOMNIA, UNSPECIFIED TYPE: ICD-10-CM

## 2021-08-10 DIAGNOSIS — R45.89 DYSPHORIC MOOD: ICD-10-CM

## 2021-08-10 DIAGNOSIS — J43.2 CENTRILOBULAR EMPHYSEMA (HCC): Primary | ICD-10-CM

## 2021-08-10 DIAGNOSIS — I27.20 PULMONARY HYPERTENSION (HCC): ICD-10-CM

## 2021-08-10 PROCEDURE — 99214 OFFICE O/P EST MOD 30 MIN: CPT | Performed by: INTERNAL MEDICINE

## 2021-08-10 RX ORDER — TRAZODONE HYDROCHLORIDE 150 MG/1
150 TABLET ORAL
Qty: 30 TABLET | Refills: 2 | Status: SHIPPED | OUTPATIENT
Start: 2021-08-10 | End: 2021-10-12 | Stop reason: SDUPTHER

## 2021-08-10 RX ORDER — OLANZAPINE 10 MG/1
10 TABLET ORAL
Qty: 30 TABLET | Refills: 2 | Status: SHIPPED | OUTPATIENT
Start: 2021-08-10 | End: 2021-11-14

## 2021-08-10 NOTE — PROGRESS NOTES
Follow-up with Palliative and Supportive Care  Lillian Lozada 67 y o  male [id number]    ASSESSMENT & PLAN:  1  Centrilobular emphysema ([facility] )    2  AAT (alpha-1-antitrypsin) deficiency (HCC)    3  Other chronic pulmonary embolism without acute cor pulmonale ([facility] )    4  Pulmonary hypertension ([facility] )    5  Chronic respiratory failure with hypoxia (HCC)    6  Gastroesophageal reflux disease, unspecified whether esophagitis present    7  Chronic venous insufficiency    8  Unsteady gait    9  Insomnia, unspecified type    10  Dysphoric mood    11  Anxiousness    12  Palliative care patient           Patient states his nebulizer prescription needs refilling and that the pharmacy does not have refills  Contacted Pulmonology   No falls in the past month per patient; he is using cane (though walker was recommended)  This provider has recommended using cane/walker, getting 3PC, getting LifeAlert or smartwatch to contact family or 911 in the even of a fall   Continue olanzapine for anxiousness, insomnia, appetite  Weight loss ongoing   Continue max-dose trazodone 150mg HS for insomnia; sleep onset still problematic   Counseled at length on sleep hygiene  Counseled that this provider would not recommend increasing medications given fall history as patient is living alone   Continue lorazepam BID PRN   Continue Tylenol, Voltaren gel (he is using PRN)  Effective   Continue Questran; he is using PRN  Helpful for his episodic diarrhea   Respiratory status stable  Using 3LPM continuously at home, 5LPM when out of the home   Patient does not wish to engage in ACP  He has states he is comfortable w/ his daughter Goldie Portillo making decisions for him  He has stated he wants "everything done"   Patient has completed the [id number] [facility] [address] vaccination series   Reviewed notes (ED, PT, Care Coordination), labs (8/8/21: Cr 1 20, albumin 4 0, lactate 1 8, Hb 13 7), imaging (8/8/21 CXR)     Emotional support provided   Medication safety issues addressed - no driving under the influence of narcotics, watch for adverse effects including AMS and respiratory depression, keep medications stored in a safe/locked environment  Requested Prescriptions     Signed Prescriptions Disp Refills    traZODone (DESYREL) 150 mg tablet 30 tablet 2     Sig: Take 1 tablet (150 mg total) by mouth daily at bedtime    OLANZapine (ZyPREXA) 10 mg tablet 30 tablet 2     Sig: Take 1 tablet (10 mg total) by mouth daily at bedtime       Medications Discontinued During This Encounter   Medication Reason    OLANZapine (ZyPREXA) 10 mg tablet Reorder    traZODone (DESYREL) 150 mg tablet Reorder       Representatives have queried the patient's controlled substance dispensing history in the Prescription Drug Monitoring Program in compliance with regulations before I have prescribed any controlled substances  The prescription history is consistent with prescribed therapy and our practice policies  30 minutes were spent face to face with patient with greater than 50% of the time spent in counseling or coordination of care including discussions of symptom assessment and management, medication review, psychosocial support, chart review, imaging review and lab review  All of the patient's questions were answered during this discussion  Return in about 2 months (around 10/10/2021)  SUBJECTIVE:  Chief Complaint   Patient presents with    Follow-up    COPD    Shortness of Breath    Gait Problem    Counseling    Medication Refill        HPI    Carolann Gould is a 67 y o  male w/ severe emphysema w/ alpha-1-antitrypsin deficiency, chronic hypoxic respiratory failure on 3LPM O2, pulmonary hypertension w/ chronic PE, former tobacco dependence (60 pack-years, quit 2017), OA, BPH, IBS, idiopathic neuropathy, h/o BCC of the face and neck, anxiousness, insomnia  He follows w/ Lucy Keith PA-C / Dr Cristóbal Crum (Pulmonology), Dr Enrico Sevilla (Cardiology)  Patient is known to [facility] clinic; last seen by me 6/8/21 for symptom management, psychosocial support  Visit today for same  Patient seen in the [facility] ED 8/8/21 for dyspnea  Patient reports his breathing is much better than it had been over the weekend  He continues to experience significant dyspnea on minimal exertion, using 3LPM O2 at rest via NC, 5LPM when ambulating  He is ambulating with a cane (he has a walker but feels his gait has improved to where he does not need it)  He leans on the shopping cart for support when at the store  He reports he has had no falls in the past month  Sleep remains problematic for this patient despite maximum dose of trazodone, as well as olanzapine + Unisom + lorazepam  Mood is stable  Patient states he is slightly more active than at last visit; he enjoys playing [employer] and is contemplating returning to playing [employer]  PDMP shows no concerns  The following portions of the medical history were reviewed: past medical history, problem list, medication list, and social history      Current Outpatient Medications:     acetaminophen (TYLENOL) 500 mg tablet, Take 2 tablets (1,000 mg total) by mouth every 8 (eight) hours, Disp: 90 tablet, Rfl: 3    amLODIPine (NORVASC) 10 mg tablet, TAKE ONE TABLET BY MOUTH DAILY , Disp: 30 tablet, Rfl: 0    budesonide (PULMICORT) 0 5 mg/2 mL nebulizer solution, Take 2 mL (0 5 mg total) by nebulization 2 (two) times a day Rinse mouth after use , Disp: 120 mL, Rfl: 5    busPIRone (BUSPAR) 10 mg tablet, TAKE ONE TABLET BY MOUTH THREE TIMES DAILY , Disp: 90 tablet, Rfl: 3    cholestyramine sugar free (QUESTRAN LIGHT) 4 g packet, Take 1 packet (4 g total) by mouth 2 (two) times a day, Disp: 60 packet, Rfl: 1    Diclofenac Sodium (VOLTAREN) 1 %, Apply 2 g topically 4 (four) times a day Apply to R knee, Disp: 50 g, Rfl: 0    doxylamine (Unisom SleepTabs) 25 MG tablet, Take 25 mg by mouth daily at bedtime as needed for sleep, Disp: , Rfl:     finasteride (PROSCAR) 5 mg tablet, Take 1 tablet (5 mg total) by mouth every morning, Disp: 90 tablet, Rfl: 3    fluticasone (FLONASE) 50 mcg/act nasal spray, 2 sprays into each nostril daily, Disp: 16 g, Rfl: 5    formoterol (Perforomist) 20 MCG/2ML nebulizer solution, Take 2 mL (20 mcg total) by nebulization 2 (two) times a day, Disp: 120 mL, Rfl: 5    gabapentin (NEURONTIN) 300 mg capsule, TAKE ONE CAPSULE BY MOUTH THREE TIMES DAILY , Disp: 90 capsule, Rfl: 3    guaiFENesin (ROBITUSSIN) 100 MG/5ML oral liquid, Take 200 mg by mouth 2 (two) times a day, Disp: , Rfl:     ipratropium-albuterol (DUO-NEB) 0 5-2 5 mg/3 mL nebulizer solution, inhale contents of 1 vial via nebulizer 4 times a day as needed, Disp: 360 mL, Rfl: 5    LORazepam (ATIVAN) 1 mg tablet, TAKE ONE TABLET BY MOUTH TWICE DAILY AS NEEDED FOR ANXIETY, Disp: 60 tablet, Rfl: 2    midodrine (PROAMATINE) 2 5 mg tablet, Take 1 tablet (2 5 mg total) by mouth 3 (three) times a day, Disp: 90 tablet, Rfl: 11    OLANZapine (ZyPREXA) 10 mg tablet, Take 1 tablet (10 mg total) by mouth daily at bedtime, Disp: 30 tablet, Rfl: 2    OXYGEN-HELIUM IN, Inhale 2L at rest- 3L with activity, Disp: , Rfl:     pantoprazole (PROTONIX) 40 mg tablet, Take 1 tablet (40 mg total) by mouth 2 (two) times a day, Disp: 180 tablet, Rfl: 3    predniSONE 20 mg tablet, Take 2 tablets (40 mg total) by mouth daily for 5 days, Disp: 10 tablet, Rfl: 0    tamsulosin (FLOMAX) 0 4 mg, Take 0 4 mg by mouth daily, Disp: , Rfl:     traZODone (DESYREL) 150 mg tablet, Take 1 tablet (150 mg total) by mouth daily at bedtime, Disp: 30 tablet, Rfl: 2    triamterene-hydrochlorothiazide (DYAZIDE) 37 5-25 mg per capsule, Take 1 capsule by mouth every morning 1 daily for 3 days when needed for edema, Disp: 30 capsule, Rfl: 0    Ventolin  (90 Base) MCG/ACT inhaler, Inhale 1 puff every 6 (six) hours as needed for shortness of breath, Disp: 1 Inhaler, Rfl: 11    Kaya Adair infusion, once a week , Disp: , Rfl:     Review of Systems   Constitutional: Positive for activity change, appetite change, fatigue and unexpected weight change  Eyes: Negative for photophobia and redness  Respiratory: Positive for shortness of breath (worse on minimal exertion)  Cardiovascular: Negative for chest pain  Gastrointestinal: Positive for diarrhea (improves w/ Questran)  Negative for abdominal pain, constipation, nausea and vomiting  Endocrine: Negative for polydipsia and polyphagia  Musculoskeletal: Positive for arthralgias and gait problem  Skin: Positive for pallor  Allergic/Immunologic: Positive for immunocompromised state  Neurological: Negative for facial asymmetry  Psychiatric/Behavioral: Positive for dysphoric mood (stable) and sleep disturbance  The patient is nervous/anxious (stable)  OBJECTIVE:  /65 (BP Location: Left arm, Patient Position: Sitting, Cuff Size: Standard)   Pulse 78   Temp 97 9 °F (36 6 °C) (Temporal)   Resp 18   Wt 81 6 kg (179 lb 12 8 oz)   SpO2 94%   BMI 21 32 kg/m²   Vital signs reviewed  Physical Exam  Constitutional:       General: He is not in acute distress  Appearance: He is ill-appearing (chronically)  He is not toxic-appearing  Comments: Frail, debilitated  HENT:      Head: Normocephalic and atraumatic  Right Ear: External ear normal       Left Ear: External ear normal    Eyes:      General: No scleral icterus  Right eye: No discharge  Left eye: No discharge  Extraocular Movements: Extraocular movements intact  Conjunctiva/sclera: Conjunctivae normal       Pupils: Pupils are equal, round, and reactive to light  Pulmonary:      Effort: Pulmonary effort is normal  No tachypnea, bradypnea or respiratory distress  Comments: O2 at 3LPM via NC  Abdominal:      General: There is no distension  Tenderness: There is no guarding  Musculoskeletal:      Right lower leg: No edema  Left lower leg: No edema  Skin:     General: Skin is dry  Coloration: Skin is pale  Neurological:      General: No focal deficit present  Mental Status: He is alert and oriented to person, place, and time  Gait: Gait abnormal (ambulating w/ 1PC)  Psychiatric:         Attention and Perception: Attention normal          Mood and Affect: Mood is depressed  Affect is flat  Speech: Speech normal          Behavior: Behavior normal  Behavior is cooperative  Thought Content: Thought content normal          Cognition and Memory: Cognition and memory normal          Judgment: Judgment normal         Ellen Artis MD  [facility] Palliative and Supportive [facility]      Portions of this document may have been created using dictation software and as such some "sound alike" terms may have been generated by the system  Do not hesitate to contact me with any questions or clarifications

## 2021-08-10 NOTE — PROGRESS NOTES
Outreach to [facility]  Pt declined home PT services   Pt prefers to go to outpatient PT

## 2021-08-10 NOTE — Clinical Note
Patient stating his Duoneb Rx was not waiting for him at his pharmacy (you sent several weeks ago w/ refills)  Can you please follow up w/ the pharmacy and the patient? Thanks

## 2021-08-10 NOTE — PATIENT INSTRUCTIONS
It was a pleasure to see you again today  Thank you for coming in     · I've sent a note to your Pulmonologist to check on your nebulizer prescription  · Refilling some medications as discussed  · Return in about 2 months  · Call us for refills on medications that we supply, as needed  · If something changes and you need to come in sooner, please call our office  PRESCRIPTION REFILL REMINDER:  All medication refills should be requested prior to [facility] on Friday  Any refill requests after noon on Friday would be addressed the following Monday  Please protect yourself from the novel Coronavirus (COVID-19)! The numbers of cases of Coronavirus are spiking in [address]  This is not a more dangerous virus, but a sign that more people in a community are spreading the virus  Please check the local disease reports near you if you consider travelling this summer  We do not advise travel to any community or State with a rising viral caseload   Wash your hands! Soap and water, or hand  with at least 60% alcohol, are both effective at killing the virus   Wear a mask! This will help protect others from any virus particles you might spread  Your mouth and nose BOTH need to be covered   Keep the distance! Keep 6 feet of distance from others people, even if they seem healthy  Keeping distance protects you from the other person's virus spread   Thank you for completing the [address] vaccination series

## 2021-08-11 ENCOUNTER — OFFICE VISIT (OUTPATIENT)
Dept: PHYSICAL THERAPY | Facility: CLINIC | Age: 76
End: 2021-08-11
Payer: MEDICARE

## 2021-08-11 DIAGNOSIS — J44.1 COPD EXACERBATION (HCC): ICD-10-CM

## 2021-08-11 DIAGNOSIS — R26.9 GAIT DISORDER: ICD-10-CM

## 2021-08-11 DIAGNOSIS — R53.81 PHYSICAL DECONDITIONING: Primary | ICD-10-CM

## 2021-08-11 PROCEDURE — 97110 THERAPEUTIC EXERCISES: CPT | Performed by: PHYSICAL THERAPIST

## 2021-08-11 PROCEDURE — 97112 NEUROMUSCULAR REEDUCATION: CPT | Performed by: PHYSICAL THERAPIST

## 2021-08-11 PROCEDURE — 97530 THERAPEUTIC ACTIVITIES: CPT | Performed by: PHYSICAL THERAPIST

## 2021-08-11 RX ORDER — IPRATROPIUM BROMIDE AND ALBUTEROL SULFATE 2.5; .5 MG/3ML; MG/3ML
3 SOLUTION RESPIRATORY (INHALATION) 4 TIMES DAILY
Qty: 360 ML | Refills: 5 | Status: SHIPPED | OUTPATIENT
Start: 2021-08-11

## 2021-08-11 NOTE — PROGRESS NOTES
Daily Note     Today's date: 2021  Patient name: Ana Maria Mitchell  : 1945  MRN: [id number]  Referring provider: Mallory Sherman MD  Dx:   Encounter Diagnosis     ICD-10-CM    1  Physical deconditioning  R53 81    2  Gait disorder  R26 9        Start Time: 845  Stop Time: 930  Total time in clinic (min): 45 minutes    Subjective: Pt reports that he went to the ER via ambulance on [place] morning because he was having difficulty breathing  Says that he was discharged the same day and that he followed up with his Dr Anmol Odell  He sees his PCP tomorrow  He feels better this week  Objective: See treatment diary below      Assessment: Tolerated treatment well  Patient demonstrated fatigue post treatment and would benefit from continued PT  Today we focused on some LE strengthening and endurance training  Also was able to add in some postural training, through standing upright with the wall at his back for tactile cue  I consistently cue him during the session to stand upright and this is hard for him to maintain, as he resorts to his kyphotic posturing  His SPO2 was maintained above 94 during entire session  He did seem to be more short of breath vs prior sessions and we took more seated rest breaks  He has been able to ambulate to/from his car to clinic with the RW and CGA  Plan: Continue per plan of care  Progress treatment as tolerated         Diagnosis: Gross deconditioning/ gait abnormality   Precautions: Fall risk   Primary Goals: 1) improve LE strength 2) gait training 3) decrease R leg pain   *asterisks by exercise = given for HEP   Manuals 7/28 8/3 8/6 8/11 7/27                                           There Ex        Bike 8 mins   6 mins 8 mins   Walking lap 1x through clinic w/RW  Walked from car into clinic; walked from clinic back to car post-session Walked from car into clinic; walked from clinic back to car post session 1x walk through clinic with RW   Standing endurance Standing marches   2x10 2x10    Standing HR   2x10     sidestepping        Standing hip extension   2x10 ea     Standing abduction   2x10 ea 2x10 ea    Sit to stands        Step ups 4" 10x fwd/lat ea  6" 10x fwd, ea; 8x lat up/over 6" 10x fwd, ea    Neuro Re-Ed        DL stance on air-ex 1 min, cues for upright posture       Tandem stance 1 min ea LE lead    1 min ea LE lead, air-ex   hurdles     2 laps w/cane/mirror bar   Air-ex beams     Sidestepping 1 lap   Postural re-education    Standing upright at wall 5 mins                                                             Re-evaluation   LCB        Ther Act                                Modalities                                                  SPO2 upon arrival on 3L of O2 95  Increased to 4L following walk from car into clinic, SPO2 94  SPO2 post session 95

## 2021-08-12 ENCOUNTER — TELEPHONE (OUTPATIENT)
Dept: OTHER | Facility: OTHER | Age: 76
End: 2021-08-12

## 2021-08-12 NOTE — TELEPHONE ENCOUNTER
Patient is calling regarding the missed appointment from the power outage  Would like a call back to reschedule

## 2021-08-13 ENCOUNTER — OFFICE VISIT (OUTPATIENT)
Dept: PHYSICAL THERAPY | Facility: CLINIC | Age: 76
End: 2021-08-13
Payer: MEDICARE

## 2021-08-13 ENCOUNTER — TELEPHONE (OUTPATIENT)
Dept: FAMILY MEDICINE CLINIC | Facility: CLINIC | Age: 76
End: 2021-08-13

## 2021-08-13 DIAGNOSIS — R53.81 PHYSICAL DECONDITIONING: Primary | ICD-10-CM

## 2021-08-13 DIAGNOSIS — R26.9 GAIT DISORDER: ICD-10-CM

## 2021-08-13 LAB
ATRIAL RATE: 71 BPM
BACTERIA BLD CULT: NORMAL
BACTERIA BLD CULT: NORMAL
P AXIS: 13 DEGREES
PR INTERVAL: 174 MS
QRS AXIS: 36 DEGREES
QRSD INTERVAL: 100 MS
QT INTERVAL: 412 MS
QTC INTERVAL: 447 MS
T WAVE AXIS: 11 DEGREES
VENTRICULAR RATE: 71 BPM

## 2021-08-13 PROCEDURE — 97110 THERAPEUTIC EXERCISES: CPT | Performed by: PHYSICAL THERAPIST

## 2021-08-13 PROCEDURE — 97112 NEUROMUSCULAR REEDUCATION: CPT | Performed by: PHYSICAL THERAPIST

## 2021-08-13 PROCEDURE — 93010 ELECTROCARDIOGRAM REPORT: CPT | Performed by: INTERNAL MEDICINE

## 2021-08-13 NOTE — TELEPHONE ENCOUNTER
S/w Patient, he is unable to R/s at this time   He will call back provided office hours and phone number

## 2021-08-13 NOTE — PROGRESS NOTES
Daily Note     Today's date: 2021  Patient name: Jordan Scott  : 1945  MRN: [id number]  Referring provider: Kristen Conley MD  Dx:   Encounter Diagnosis     ICD-10-CM    1  Physical deconditioning  R53 81    2  Gait disorder  R26 9        Start Time: 915  Stop Time: 1000  Total time in clinic (min): 45 minutes    Subjective: Pt reports that he is feeling tired today and offers no new complaints  Says that he was just fatigued following last session  Objective: See treatment diary below      Assessment: Tolerated treatment fair  Patient demonstrated fatigue post treatment and would benefit from continued PT  Pt had increased levels of fatigue today vs previous session  This was evident through him requiring a seated rest break on his way into the clinic from his car, and more frequent breaks during session as well  His SPO2 levels were maintained above 95 during the whole session, but he did seem more out of breath than during prior sessions  We focused on some gait training with hurdles, proximal strengthening through sit to stands and general endurance exercises during the session  We will progress as able  Plan: Continue per plan of care  Progress treatment as tolerated         Diagnosis: Gross deconditioning/ gait abnormality   Precautions: Fall risk   Primary Goals: 1) improve LE strength 2) gait training 3) decrease R leg pain   *asterisks by exercise = given for HEP   Manuals 7/28 8/3 8/6 8/11 8/13                                           There Ex        Bike 8 mins   6 mins    Walking lap 1x through clinic w/RW  Walked from car into clinic; walked from clinic back to car post-session Walked from car into clinic; walked from clinic back to car post session Walked from car into clinic; walked from clinic back to car post session   Standing endurance        Standing marches   2x10 2x10    Standing HR   2x10     sidestepping        Standing hip extension   2x10 ea     Standing abduction   2x10 ea 2x10 ea    Sit to stands     5x chair + 2 air-ex, min use of hands   Step ups 4" 10x fwd/lat ea  6" 10x fwd, ea; 8x lat up/over 6" 10x fwd, ea    Neuro Re-Ed        DL stance on air-ex 1 min, cues for upright posture    2 mins, cues to upright posture   Tandem stance 1 min ea LE lead       hurdles     2 laps w/cane/mirror bar fwd/lat   Air-ex beams        Postural re-education    Standing upright at wall 5 mins                                                             Re-evaluation   LCB        Ther Act                                Modalities                                                  SPO2 upon arrival on 3L of O2 96  SPO2 during session 96  SPO2 post session 96

## 2021-08-17 ENCOUNTER — OFFICE VISIT (OUTPATIENT)
Dept: PHYSICAL THERAPY | Facility: CLINIC | Age: 76
End: 2021-08-17
Payer: MEDICARE

## 2021-08-17 DIAGNOSIS — R26.9 GAIT DISORDER: ICD-10-CM

## 2021-08-17 DIAGNOSIS — R53.81 PHYSICAL DECONDITIONING: Primary | ICD-10-CM

## 2021-08-17 PROCEDURE — 97110 THERAPEUTIC EXERCISES: CPT | Performed by: PHYSICAL THERAPIST

## 2021-08-17 PROCEDURE — 97112 NEUROMUSCULAR REEDUCATION: CPT | Performed by: PHYSICAL THERAPIST

## 2021-08-17 NOTE — PROGRESS NOTES
Daily Note     Today's date: 2021  Patient name: Lula Frank  : 1945  MRN: [id number]  Referring provider: Manuel Alejandre MD  Dx:   Encounter Diagnosis     ICD-10-CM    1  Physical deconditioning  R53 81    2  Gait disorder  R26 9        Start Time: 1000  Stop Time: 1045  Total time in clinic (min): 45 minutes    Subjective: Pt reports that he was a bit tired following last session  Says that he still feels weak but that his leg pain is gone  Objective: See treatment diary below      Assessment: Tolerated treatment well  Patient demonstrated fatigue post treatment and would benefit from continued PT  Pt did require a seated rest break on his way into the clinic as he was too fatigue to make it into the office  We worked on a lot of standing strengthening and endurance exercises and he did well with these, but did become lightheaded and required a long seated rest break  His SPO2 was maintained above 96 the entire session  He has no complaints of R LE pain  We will progress as able  Plan: Continue per plan of care  Progress treatment as tolerated         Diagnosis: Gross deconditioning/ gait abnormality   Precautions: Fall risk   Primary Goals: 1) improve LE strength 2) gait training 3) decrease R leg pain   *asterisks by exercise = given for HEP   Manuals 8/17 8/3 8/6 8/11 8/13                                           There Ex        Bike 3 mins   6 mins    Walking lap Walked from car into clinic; walked from clinic back to car post session  Walked from car into clinic; walked from clinic back to car post-session Walked from car into clinic; walked from clinic back to car post session Walked from car into clinic; walked from clinic back to car post session   Standing endurance        Standing marches 2x10  2x10 2x10    Standing HR   2x10     sidestepping        Standing hip extension 2x10  2x10 ea     Standing abduction 2x10  2x10 ea 2x10 ea    Sit to stands     5x chair + 2 air-ex, min use of hands   Step ups 6" 10x fwd, ea 8x lat up/over  6" 10x fwd, ea; 8x lat up/over 6" 10x fwd, ea    Neuro Re-Ed        DL stance on air-ex     2 mins, cues to upright posture   Tandem stance        hurdles 2 laps fwd/lateral at mirror    2 laps w/cane/mirror bar fwd/lat   Air-ex beams        Postural re-education    Standing upright at wall 5 mins                                                             Re-evaluation   LCB        Ther Act                                Modalities                                                  SPO2 upon arrival on 3L of O2 96  SPO2 during session 97  SPO2 post session 96

## 2021-08-18 ENCOUNTER — OFFICE VISIT (OUTPATIENT)
Dept: PHYSICAL THERAPY | Facility: CLINIC | Age: 76
End: 2021-08-18
Payer: MEDICARE

## 2021-08-18 DIAGNOSIS — R53.81 PHYSICAL DECONDITIONING: Primary | ICD-10-CM

## 2021-08-18 DIAGNOSIS — R26.9 GAIT DISORDER: ICD-10-CM

## 2021-08-18 PROCEDURE — 97110 THERAPEUTIC EXERCISES: CPT | Performed by: PHYSICAL THERAPIST

## 2021-08-18 PROCEDURE — 97530 THERAPEUTIC ACTIVITIES: CPT | Performed by: PHYSICAL THERAPIST

## 2021-08-18 NOTE — PROGRESS NOTES
Daily Note     Today's date: 2021  Patient name: Mamta Hand  : 1945  MRN: [id number]  Referring provider: Ashlee Goodman MD  Dx:   Encounter Diagnosis     ICD-10-CM    1  Physical deconditioning  R53 81    2  Gait disorder  R26 9        Start Time: 845  Stop Time: 930  Total time in clinic (min): 45 minutes    Subjective: Pt reports that he is feeling very fatigued and weak today  Says that he was able to sleep last night, but just very tired and feeling overall exhausted  Objective: See treatment diary below      Assessment: Tolerated treatment fair  Patient demonstrated fatigue post treatment, exhibited good technique with therapeutic exercises and would benefit from continued PT  Pt was extremely fatigued during session today and required many seated rest breaks to get through session  He had to rest on his way into and out of the clinic  SPO2 was maintained above 93 the entire session  I believe he had a greater level of fatigue today because he had PT yesterday as well  We were only able to get through a few exercise today  Will resume full exercise set as able  Plan: Continue per plan of care  Progress treatment as tolerated         Diagnosis: Gross deconditioning/ gait abnormality   Precautions: Fall risk   Primary Goals: 1) improve LE strength 2) gait training 3) decrease R leg pain   *asterisks by exercise = given for HEP   Manuals                                            There Ex        Bike 3 mins 5 mins  6 mins    Walking lap Walked from car into clinic; walked from clinic back to car post session Walked from car into clinic; walked from clinic back to car post session Walked from car into clinic; walked from clinic back to car post-session Walked from car into clinic; walked from clinic back to car post session Walked from car into clinic; walked from clinic back to car post session   Standing endurance        Standing marches 2x10  2x10 2x10 Standing HR   2x10     sidestepping        Standing hip extension 2x10  2x10 ea     Standing abduction 2x10  2x10 ea 2x10 ea    Standing extensions  tamiko 5# 20x      Standing rows  tamiko 6# 30x      Sit to stands     5x chair + 2 air-ex, min use of hands   Step ups 6" 10x fwd, ea 8x lat up/over  6" 10x fwd, ea; 8x lat up/over 6" 10x fwd, ea    Neuro Re-Ed        DL stance on air-ex     2 mins, cues to upright posture   Tandem stance        hurdles 2 laps fwd/lateral at mirror    2 laps w/cane/mirror bar fwd/lat   Air-ex beams        Postural re-education    Standing upright at wall 5 mins                                                             Re-evaluation           Ther Act                                Modalities                                                  SPO2 upon arrival on 3L of O2 96  SPO2 during session 93-96  SPO2 post session 96

## 2021-08-19 ENCOUNTER — PATIENT OUTREACH (OUTPATIENT)
Dept: FAMILY MEDICINE CLINIC | Facility: CLINIC | Age: 76
End: 2021-08-19

## 2021-08-19 ENCOUNTER — OFFICE VISIT (OUTPATIENT)
Dept: FAMILY MEDICINE CLINIC | Facility: CLINIC | Age: 76
End: 2021-08-19
Payer: MEDICARE

## 2021-08-19 VITALS
DIASTOLIC BLOOD PRESSURE: 60 MMHG | HEIGHT: 77 IN | HEART RATE: 73 BPM | OXYGEN SATURATION: 97 % | RESPIRATION RATE: 18 BRPM | WEIGHT: 177 LBS | BODY MASS INDEX: 20.9 KG/M2 | SYSTOLIC BLOOD PRESSURE: 112 MMHG | TEMPERATURE: 98.1 F

## 2021-08-19 DIAGNOSIS — R60.0 BILATERAL LEG EDEMA: ICD-10-CM

## 2021-08-19 DIAGNOSIS — D69.6 THROMBOCYTOPENIA (HCC): ICD-10-CM

## 2021-08-19 DIAGNOSIS — I83.029 VENOUS ULCER OF LEFT LOWER EXTREMITY WITH VARICOSE VEINS (HCC): ICD-10-CM

## 2021-08-19 DIAGNOSIS — L97.929 VENOUS ULCER OF LEFT LOWER EXTREMITY WITH VARICOSE VEINS (HCC): ICD-10-CM

## 2021-08-19 DIAGNOSIS — J96.11 CHRONIC RESPIRATORY FAILURE WITH HYPOXIA (HCC): Chronic | ICD-10-CM

## 2021-08-19 DIAGNOSIS — E88.01 AAT (ALPHA-1-ANTITRYPSIN) DEFICIENCY (HCC): Primary | Chronic | ICD-10-CM

## 2021-08-19 PROCEDURE — 99214 OFFICE O/P EST MOD 30 MIN: CPT | Performed by: FAMILY MEDICINE

## 2021-08-19 NOTE — PROGRESS NOTES
Met with patient during PCP visit  Pt continues to utilize oxygen at 3 liters up to 5 liters with exertion  Pt complains of fatigue  States he is not sleeping at night despite taking a sleep aide  Pt continues to go to outpatient PT  He feels he has made progress by going to outpatient PT  Discussed patients limitations with driving while fatigued  Encouraged patient to stay at home and do not attempt to drive if he feels tired or fatigued  Explained the dangers involved and how easy it is to fall asleep while operating a motor vehicle  This was mentioned to Dr Boston Patel to reinforce with patient  Complex episode closed at this time  Will remain on surveillance

## 2021-08-19 NOTE — PROGRESS NOTES
Follow up COPD      Subjective:           Problem List Items Addressed This Visit        Digestive    AAT (alpha-1-antitrypsin) deficiency ([facility] [address] ) - Primary (Chronic) stable f/u Pulmonary       Respiratory    Chronic respiratory failure with hypoxia (HCC) (Chronic) cont meds Pulm f/u       Cardiovascular and Mediastinum    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 (HCC) compression elevation high protein       Other    Bilateral leg edema stable improved as above    Thrombocytopenia (HCC) stable              No orders of the defined types were placed in this encounter  Patient Instructions   Follow up specialty palliative care cont medications Vaccination updates   cont current tx follow up specialty palliative care     5-6 months Dr India Segura    BMI Counseling: Body mass index is 20 99 kg/m²  Discussed the patient's BMI with him  The [address] [id number]    Chief Complaint   Patient presents with    COPD     Had appointment last week, but we had to cancel  Was in ED on 8/8/21 for COPD     HPI  F/u COPD PE HTN PVD Pulm Ntn resp failure alpha 1 follows Pulm Cardiology Palliative care    /60 (BP Location: Left arm, Patient Position: Sitting, Cuff Size: Standard)   Pulse 73   Temp 98 1 °F (36 7 °C) (Tympanic)   Resp 18   Ht 6' 5" (1 956 m)   Wt 80 3 kg (177 lb)   SpO2 97%   BMI 20 99 kg/m²       Allergies   Allergen Reactions    Chantix [Varenicline]      Caused prostate infection       Current Outpatient Medications on File Prior to Visit   Medication Sig Dispense Refill    acetaminophen (TYLENOL) 500 mg tablet Take 2 tablets (1,000 mg total) by mouth every 8 (eight) hours 90 tablet 3    amLODIPine (NORVASC) 10 mg tablet TAKE ONE TABLET BY MOUTH DAILY  30 tablet 0    budesonide (PULMICORT) 0 5 mg/2 mL nebulizer solution Take 2 mL (0 5 mg total) by nebulization 2 (two) times a day Rinse mouth after use   120 mL 5    busPIRone (BUSPAR) 10 mg tablet TAKE ONE TABLET BY MOUTH THREE TIMES DAILY  90 tablet 3    cholestyramine sugar free (QUESTRAN LIGHT) 4 g packet Take 1 packet (4 g total) by mouth 2 (two) times a day 60 packet 1    Diclofenac Sodium (VOLTAREN) 1 % Apply 2 g topically 4 (four) times a day Apply to R knee 50 g 0    doxylamine (Unisom SleepTabs) 25 MG tablet Take 25 mg by mouth daily at bedtime as needed for sleep      finasteride (PROSCAR) 5 mg tablet Take 1 tablet (5 mg total) by mouth every morning 90 tablet 3    fluticasone (FLONASE) 50 mcg/act nasal spray 2 sprays into each nostril daily 16 g 5    formoterol (Perforomist) 20 MCG/2ML nebulizer solution Take 2 mL (20 mcg total) by nebulization 2 (two) times a day 120 mL 5    gabapentin (NEURONTIN) 300 mg capsule TAKE ONE CAPSULE BY MOUTH THREE TIMES DAILY  90 capsule 3    guaiFENesin (ROBITUSSIN) 100 MG/5ML oral liquid Take 200 mg by mouth 2 (two) times a day      ipratropium-albuterol (DUO-NEB) 0 5-2 5 mg/3 mL nebulizer solution Take 3 mL by nebulization 4 (four) times a day 360 mL 5    LORazepam (ATIVAN) 1 mg tablet TAKE ONE TABLET BY MOUTH TWICE DAILY AS NEEDED FOR ANXIETY 60 tablet 2    midodrine (PROAMATINE) 2 5 mg tablet Take 1 tablet (2 5 mg total) by mouth 3 (three) times a day 90 tablet 11    OLANZapine (ZyPREXA) 10 mg tablet Take 1 tablet (10 mg total) by mouth daily at bedtime 30 tablet 2    OXYGEN-HELIUM IN Inhale 2L at rest- 3L with activity      pantoprazole (PROTONIX) 40 mg tablet Take 1 tablet (40 mg total) by mouth 2 (two) times a day 180 tablet 3    tamsulosin (FLOMAX) 0 4 mg Take 0 4 mg by mouth daily      traZODone (DESYREL) 150 mg tablet Take 1 tablet (150 mg total) by mouth daily at bedtime 30 tablet 2    triamterene-hydrochlorothiazide (DYAZIDE) 37 5-25 mg per capsule Take 1 capsule by mouth every morning 1 daily for 3 days when needed for edema 30 capsule 0    Ventolin  (90 Base) MCG/ACT inhaler Inhale 1 puff every 6 (six) hours as needed for shortness of breath 1 Inhaler 11    ZEMAIRA infusion once a week       [DISCONTINUED] busPIRone (BUSPAR) 10 mg tablet TAKE ONE TABLET BY MOUTH THREE TIMES DAILY  90 tablet 0     No current facility-administered medications on file prior to visit  Past Medical History:   Diagnosis Date    Anesthesia complication     Difficult to wake up    Arthritis     BPH (benign prostatic hyperplasia)     urinary frequency    Cancer (HCC)     basal cell neck, face    COPD (chronic obstructive pulmonary disease) (HCC)     COPD exacerbation ([facility] [address] ) 12/29/2019    Full dentures     Hiatal hernia     History of methicillin resistant staphylococcus aureus (MRSA)     10/11/2018 MRSA (nares) positive    Hypertension     Irritable bowel syndrome     Kidney stone     at least 7 episodes    Liver disease     Alpha 1- enzyme deficiency - diagnosed 2002  has been on weekly replacement therapy since then    Pulmonary emphysema ([facility] [address] )     1/25/15  FEV1 - 2 45 liters or 59% of predicted    RSV infection 12/2017    Wears glasses     for driving only       Past Surgical History:   Procedure Laterality Date    BACK SURGERY  2008    discectomy    COLONOSCOPY      COLONOSCOPY N/A 3/10/2017    Procedure: Rehana Tinsley;  Surgeon: Osmin Martinez MD;  Location: [place] [id number] GI LAB; Service:    Phylliss Pipe      removal of kidney stones    ESOPHAGOGASTRODUODENOSCOPY N/A 3/10/2017    Procedure: ESOPHAGOGASTRODUODENOSCOPY (EGD); Surgeon: Osmin Martinez MD;  Location: [facility] GI LAB; Service:     LITHOTRIPSY      [place] ESOPHAGOGASTRODUODENOSCOPY TRANSORAL DIAGNOSTIC N/A 11/20/2018    Procedure: ESOPHAGOGASTRODUODENOSCOPY (EGD); Surgeon: Osmin Martinez MD;  Location: [facility] GI LAB; Service: Gastroenterology    TONSILLECTOMY      VEIN LIGATION AND STRIPPING Bilateral     1980's          reports that he quit smoking about 3 years ago  He has a 60 00 pack-year smoking history  He has never used smokeless tobacco  He reports previous alcohol use   He reports previous drug use      reports that he quit smoking about 3 years ago  He has a 60 00 pack-year smoking history  He has never used smokeless tobacco         Review of Systems   Constitutional: Negative for appetite change, diaphoresis and fever  Frail   HENT: Negative for congestion, ear pain, postnasal drip, sinus pressure, tinnitus and voice change  Eyes: Negative for discharge and itching  Respiratory: Positive for shortness of breath  Negative for cough, chest tightness and stridor  Cardiovascular: Negative for chest pain and palpitations  Gastrointestinal: Positive for diarrhea  Negative for abdominal distention, blood in stool and vomiting  Endocrine: Negative for cold intolerance  Genitourinary: Negative for flank pain, genital sores and testicular pain  Musculoskeletal: Positive for gait problem  Negative for arthralgias, joint swelling and myalgias  Skin: Negative for rash  Neurological: Positive for weakness  Negative for tremors, seizures, speech difficulty and headaches  Hematological: Negative for adenopathy  Psychiatric/Behavioral: Negative for behavioral problems, dysphoric mood, hallucinations and suicidal ideas  Physical Exam  Vitals and nursing note reviewed  Constitutional:       General: He is not in acute distress  Appearance: He is well-developed  He is ill-appearing  HENT:      Head: Normocephalic and atraumatic  Right Ear: External ear normal       Left Ear: External ear normal       Mouth/Throat:      Pharynx: No oropharyngeal exudate  Eyes:      General: No scleral icterus  Right eye: No discharge  Left eye: No discharge  Conjunctiva/sclera: Conjunctivae normal       Pupils: Pupils are equal, round, and reactive to light  Neck:      Trachea: No tracheal deviation  Cardiovascular:      Rate and Rhythm: Normal rate and regular rhythm  Heart sounds: No murmur heard  No friction rub     Pulmonary:      Effort: No respiratory distress  Breath sounds: No stridor  No wheezing or rales  Comments: On o2 nc  Abdominal:      General: Bowel sounds are normal  There is no distension  Palpations: Abdomen is soft  Tenderness: There is no guarding or rebound  Musculoskeletal:         General: No deformity  Cervical back: Normal range of motion and neck supple  Lymphadenopathy:      Cervical: No cervical adenopathy  Skin:     General: Skin is warm and dry  Capillary Refill: Capillary refill takes 2 to 3 seconds  Findings: No rash  Neurological:      Mental Status: He is alert and oriented to person, place, and time  Mental status is at baseline  Cranial Nerves: No cranial nerve deficit  Motor: Weakness present  No abnormal muscle tone  Coordination: Coordination normal    Psychiatric:         Behavior: Behavior normal          Thought Content:  Thought content normal          Judgment: Judgment normal

## 2021-08-19 NOTE — PATIENT INSTRUCTIONS
Follow up specialty palliative care cont medications Vaccination updates   cont current tx follow up specialty palliative care     5-6 months Dr Preston Reas

## 2021-08-20 ENCOUNTER — OFFICE VISIT (OUTPATIENT)
Dept: PHYSICAL THERAPY | Facility: CLINIC | Age: 76
End: 2021-08-20
Payer: MEDICARE

## 2021-08-20 DIAGNOSIS — R53.81 PHYSICAL DECONDITIONING: Primary | ICD-10-CM

## 2021-08-20 DIAGNOSIS — R26.9 GAIT DISORDER: ICD-10-CM

## 2021-08-20 PROCEDURE — 97110 THERAPEUTIC EXERCISES: CPT | Performed by: PHYSICAL THERAPIST

## 2021-08-20 NOTE — PROGRESS NOTES
Daily Note     Today's date: 2021  Patient name: Coleen Valera  : 1945  MRN: [id number]  Referring provider: Jarrett Johnson MD  Dx:   Encounter Diagnosis     ICD-10-CM    1  Physical deconditioning  R53 81    2  Gait disorder  R26 9        Start Time: 915  Stop Time:   Total time in clinic (min): 35 minutes    Subjective: Pt reports that he was so weak yesterday that he was unable to walk into the doctors office without use of a wheelchair  He still feels very weak today  Objective: See treatment diary below      Assessment: Tolerated treatment fair  Patient demonstrated fatigue post treatment and would benefit from continued PT  Pt was feeling too fatigued today to be able to walk into the clinic so he was transported vis wheelchair  He was only able to tolerate riding the bike for a few minutes and one standing exercise before becoming too fatigued to continue  He was transported back to his car via wheelchair  His O2 was maintained at 96 the entire session  I cancelled his back to back appointment for next week, as I think 3x a week may be too much for him  Plan: Continue per plan of care  Progress treatment as tolerated         Diagnosis: Gross deconditioning/ gait abnormality   Precautions: Fall risk   Primary Goals: 1) improve LE strength 2) gait training 3) decrease R leg pain   *asterisks by exercise = given for HEP   Manuals                                            There Ex        Bike 3 mins 5 mins 8 mins 6 mins    Walking lap Walked from car into clinic; walked from clinic back to car post session Walked from car into clinic; walked from clinic back to car post session  Walked from car into clinic; walked from clinic back to car post session Walked from car into clinic; walked from clinic back to car post session   Standing endurance        Standing marches 2x10   2x10    Standing HR        sidestepping        Standing hip extension 2x10 Standing abduction 2x10   2x10 ea    Standing extensions  tamiko 5# 20x      Standing rows  tamiko 6# 30x Loa 6# 30x     Sit to stands     5x chair + 2 air-ex, min use of hands   Step ups 6" 10x fwd, ea 8x lat up/over   6" 10x fwd, ea    Neuro Re-Ed        DL stance on air-ex     2 mins, cues to upright posture   Tandem stance        hurdles 2 laps fwd/lateral at mirror    2 laps w/cane/mirror bar fwd/lat   Air-ex beams        Postural re-education    Standing upright at wall 5 mins                                                             Re-evaluation           Ther Act                                Modalities                                                  SPO2 upon arrival on 3L of O2 96  SPO2 during session 96  SPO2 post session 96

## 2021-08-24 ENCOUNTER — OFFICE VISIT (OUTPATIENT)
Dept: PHYSICAL THERAPY | Facility: CLINIC | Age: 76
End: 2021-08-24
Payer: MEDICARE

## 2021-08-24 DIAGNOSIS — R53.81 PHYSICAL DECONDITIONING: Primary | ICD-10-CM

## 2021-08-24 DIAGNOSIS — R26.9 GAIT DISORDER: ICD-10-CM

## 2021-08-24 PROCEDURE — 97110 THERAPEUTIC EXERCISES: CPT | Performed by: PHYSICAL THERAPIST

## 2021-08-24 PROCEDURE — 97530 THERAPEUTIC ACTIVITIES: CPT | Performed by: PHYSICAL THERAPIST

## 2021-08-24 NOTE — PROGRESS NOTES
Daily Note     Today's date: 2021  Patient name: Jordan Scott  : 1945  MRN: [id number]  Referring provider: Kristen Conley MD  Dx:   Encounter Diagnosis     ICD-10-CM    1  Physical deconditioning  R53 81    2  Gait disorder  R26 9        Start Time: 1000  Stop Time: 1045  Total time in clinic (min): 45 minutes    Subjective: Pt reports that he feels he is able to walk better today, however he tried to go into the grocery store yesterday for a few items and had to be helped back to his car due to fatigue  He was using his cane  Objective: See treatment diary below      Assessment: Tolerated treatment well  Patient demonstrated fatigue post treatment and would benefit from continued PT  Today, pt was able to ambulate to and from his car with his SPC vs WC or RW as previously used  He did require seated rest break in the lobby each time due to fatigue  We were able to add a 1 5# weight to each ankle during standing strengthening exercise with good tolerance  I discussed with him that it may be beneficial to se a rollator and that we could look into getting him one through his insurance for community ambulation  Plan: Continue per plan of care  Progress treatment as tolerated         Diagnosis: Gross deconditioning/ gait abnormality   Precautions: Fall risk   Primary Goals: 1) improve LE strength 2) gait training 3) decrease R leg pain   *asterisks by exercise = given for HEP   Manuals                                            There Ex        Bike 3 mins 5 mins 8 mins 6 mins    Walking lap Walked from car into clinic; walked from clinic back to car post session Walked from car into clinic; walked from clinic back to car post session  Walked from car into clinic with [facility]; walked to car form clinic with [facility] post session Walked from car into clinic; walked from clinic back to car post session   Standing endurance        Standing marches 2x10   2x10 1 5#    Standing HR 2x10 1 5#    sidestepping        Standing hip extension 2x10   2x10 1 5#    Standing knee flexion    2x10 1 5#    Mini squats    2x10    Standing abduction 2x10   2x10 1 5#    Standing extensions  christopher 5# 20x      Standing rows  christopher 6# 30x Christopher 6# 30x     Sit to stands     5x chair + 2 air-ex, min use of hands   Step ups 6" 10x fwd, ea 8x lat up/over       Neuro Re-Ed        DL stance on air-ex     2 mins, cues to upright posture   Tandem stance        hurdles 2 laps fwd/lateral at mirror    2 laps w/cane/mirror bar fwd/lat   Air-ex beams        Postural re-education                                                                 Re-evaluation           Ther Act                                Modalities                                                  SPO2 upon arrival on 3L of O2 94  SPO2 during session 94-97  SPO2 post session 95

## 2021-08-25 ENCOUNTER — APPOINTMENT (OUTPATIENT)
Dept: PHYSICAL THERAPY | Facility: CLINIC | Age: 76
End: 2021-08-25
Payer: MEDICARE

## 2021-08-26 ENCOUNTER — OFFICE VISIT (OUTPATIENT)
Dept: PULMONOLOGY | Facility: MEDICAL CENTER | Age: 76
End: 2021-08-26
Payer: MEDICARE

## 2021-08-26 VITALS
DIASTOLIC BLOOD PRESSURE: 62 MMHG | RESPIRATION RATE: 12 BRPM | HEART RATE: 77 BPM | WEIGHT: 175 LBS | SYSTOLIC BLOOD PRESSURE: 130 MMHG | HEIGHT: 77 IN | OXYGEN SATURATION: 97 % | BODY MASS INDEX: 20.66 KG/M2 | TEMPERATURE: 97.7 F

## 2021-08-26 DIAGNOSIS — N13.8 BENIGN PROSTATIC HYPERPLASIA WITH URINARY OBSTRUCTION: Primary | ICD-10-CM

## 2021-08-26 DIAGNOSIS — N40.1 BENIGN PROSTATIC HYPERPLASIA WITH URINARY OBSTRUCTION: Primary | ICD-10-CM

## 2021-08-26 DIAGNOSIS — J43.2 CENTRILOBULAR EMPHYSEMA (HCC): Primary | ICD-10-CM

## 2021-08-26 DIAGNOSIS — J96.11 CHRONIC RESPIRATORY FAILURE WITH HYPOXIA (HCC): Chronic | ICD-10-CM

## 2021-08-26 DIAGNOSIS — E88.01 AAT (ALPHA-1-ANTITRYPSIN) DEFICIENCY (HCC): Chronic | ICD-10-CM

## 2021-08-26 PROCEDURE — 99214 OFFICE O/P EST MOD 30 MIN: CPT | Performed by: INTERNAL MEDICINE

## 2021-08-26 RX ORDER — PREDNISONE 10 MG/1
TABLET ORAL
Qty: 60 TABLET | Refills: 1 | Status: SHIPPED | OUTPATIENT
Start: 2021-08-26 | End: 2021-11-18 | Stop reason: HOSPADM

## 2021-08-26 RX ORDER — TAMSULOSIN HYDROCHLORIDE 0.4 MG/1
CAPSULE ORAL
Qty: 90 CAPSULE | Refills: 0 | Status: SHIPPED | OUTPATIENT
Start: 2021-08-26 | End: 2022-01-06

## 2021-08-26 NOTE — PATIENT INSTRUCTIONS
Try Spiriva respimat 2 5 Mcg 2 puffs daily    If needed Prednisone 10 mg - 4 tabs x 3 days then 3 tabs x 3 days then 2 tabs x 3 days then 1 tabs x 3 days

## 2021-08-26 NOTE — PROGRESS NOTES
Assessment/Plan        Problem List Items Addressed This Visit        Digestive    AAT (alpha-1-antitrypsin) deficiency (HCC) (Chronic)     Continue with weekly infusion of alpha 1 proteinase inhibitor  He has been doing this for several years            Respiratory    Chronic respiratory failure with hypoxia (HCC) (Chronic)      Oxygen 3 liters/minute on continuous basis  When he leaves the house he uses his pulse dose oxygen concentrator go up to 5 L per when needed         Centrilobular emphysema ([address] ) - Primary     Has severe emphysema  Last spirometry showed FEV1 1 8 L or 45% predicted  Continue with nebulizer Perforomist 20 mcg b i d  budesonide 0 5 b i d  Does have nebulizer DuoNeb again use up to 4 times a day as needed  I gave him emergency pack of prednisone to keep on hand if needed he will start 40 mg daily and taper by 10 mg every 4th day  I also gave him sample of Spiriva Respimat 2 5 mcg to try 2 puffs once a day in the to see if this helps his breathing  I can give additional samples if needed         Relevant Medications    predniSONE 10 mg tablet            Follow-up, short of breath with activity      WILL Zhou was seen in ER on 8/8/21 for shortness of breath  He was diagnosed with COPD exacerbation and given dose of Solu-Medrol 125 mg from neb treatment DuoNeb and discharged home with prednisone 40 mg for 5 days  Chest x-ray done that day was reviewed by me and shows some minimal right lower lobe atelectasis  He was discharged home with prescription for prednisone 40 mg daily for 5 days  He does have chronic shortness with activity  He does get weekly IV infusion of alpha 1 proteinase inhibitor (Zemaira) at home by a visiting nurse for alpha 1 antitrypsin deficiency  He has been on this for several years  He does have severe emphysma and last FEV1 was 1 8 L or 45% of predicted    He does use oxygen at 3 lm nc at home and increases to 5 l/m pulse dose when he leaves the house  He used to weigh about 190 lb in September 2020 and now weighs 175 lb  He does use nebulizer with performist 20 mcg BID and budesonide 0 5 mg BID and Duoneb up to 4 x a day as needed        Past Medical History:   Diagnosis Date    Anesthesia complication     Difficult to wake up    Arthritis     BPH (benign prostatic hyperplasia)     urinary frequency    Cancer (HCC)     basal cell neck, face    COPD [facility]chronic obstructive pulmonary disease) ([facility]     COPD exacerbation ([facility] [address] ) 12/29/2019    Full dentures     Hiatal hernia     History of methicillin resistant staphylococcus aureus (MRSA)     10/11/2018 MRSA (nares) positive    Hypertension     Irritable bowel syndrome     Kidney stone     at least 7 episodes    Liver disease     Alpha 1- enzyme deficiency - diagnosed 2002  has been on weekly replacement therapy since then    Pulmonary emphysema ([facility] [address] )     1/25/15  FEV1 - 2 45 liters or 59% of predicted    RSV infection 12/2017    Wears glasses     for driving only       Past Surgical History:   Procedure Laterality Date    BACK SURGERY  2008    discectomy    COLONOSCOPY      COLONOSCOPY N/A 3/10/2017    Procedure: Allie Samson;  Surgeon: Zainab Krause MD;  Location: [place] [address] GI LAB; Service:    Keny Zhu      removal of kidney stones    ESOPHAGOGASTRODUODENOSCOPY N/A 3/10/2017    Procedure: ESOPHAGOGASTRODUODENOSCOPY (EGD); Surgeon: Zainab Krause MD;  Location: [facility] GI LAB; Service:     LITHOTRIPSY      AR ESOPHAGOGASTRODUODENOSCOPY TRANSORAL DIAGNOSTIC N/A 11/20/2018    Procedure: ESOPHAGOGASTRODUODENOSCOPY (EGD); Surgeon: Zainab Krause MD;  Location: [facility] GI LAB;   Service: Gastroenterology    TONSILLECTOMY      VEIN LIGATION AND STRIPPING Bilateral     1980's         Current Outpatient Medications:     acetaminophen (TYLENOL) 500 mg tablet, Take 2 tablets (1,000 mg total) by mouth every 8 (eight) hours, Disp: 90 tablet, Rfl: 3    amLODIPine (NORVASC) 10 mg tablet, TAKE ONE TABLET BY MOUTH DAILY , Disp: 30 tablet, Rfl: 0    budesonide (PULMICORT) 0 5 mg/2 mL nebulizer solution, Take 2 mL (0 5 mg total) by nebulization 2 (two) times a day Rinse mouth after use , Disp: 120 mL, Rfl: 5    busPIRone (BUSPAR) 10 mg tablet, TAKE ONE TABLET BY MOUTH THREE TIMES DAILY , Disp: 90 tablet, Rfl: 3    cholestyramine sugar free (QUESTRAN LIGHT) 4 g packet, Take 1 packet (4 g total) by mouth 2 (two) times a day, Disp: 60 packet, Rfl: 1    Diclofenac Sodium (VOLTAREN) 1 %, Apply 2 g topically 4 (four) times a day Apply to R knee, Disp: 50 g, Rfl: 0    doxylamine (Unisom SleepTabs) 25 MG tablet, Take 25 mg by mouth daily at bedtime as needed for sleep, Disp: , Rfl:     finasteride (PROSCAR) 5 mg tablet, Take 1 tablet (5 mg total) by mouth every morning, Disp: 90 tablet, Rfl: 3    fluticasone (FLONASE) 50 mcg/act nasal spray, 2 sprays into each nostril daily, Disp: 16 g, Rfl: 5    formoterol (Perforomist) 20 MCG/2ML nebulizer solution, Take 2 mL (20 mcg total) by nebulization 2 (two) times a day, Disp: 120 mL, Rfl: 5    gabapentin (NEURONTIN) 300 mg capsule, TAKE ONE CAPSULE BY MOUTH THREE TIMES DAILY , Disp: 90 capsule, Rfl: 3    guaiFENesin (ROBITUSSIN) 100 MG/5ML oral liquid, Take 200 mg by mouth 2 (two) times a day, Disp: , Rfl:     ipratropium-albuterol (DUO-NEB) 0 5-2 5 mg/3 mL nebulizer solution, Take 3 mL by nebulization 4 (four) times a day, Disp: 360 mL, Rfl: 5    LORazepam (ATIVAN) 1 mg tablet, TAKE ONE TABLET BY MOUTH TWICE DAILY AS NEEDED FOR ANXIETY, Disp: 60 tablet, Rfl: 2    midodrine (PROAMATINE) 2 5 mg tablet, Take 1 tablet (2 5 mg total) by mouth 3 (three) times a day, Disp: 90 tablet, Rfl: 11    OLANZapine (ZyPREXA) 10 mg tablet, Take 1 tablet (10 mg total) by mouth daily at bedtime, Disp: 30 tablet, Rfl: 2    OXYGEN-HELIUM IN, Inhale 2L at rest- 3L with activity, Disp: , Rfl:     pantoprazole (PROTONIX) 40 mg tablet, Take 1 tablet (40 mg total) by mouth 2 (two) times a day, Disp: 180 tablet, Rfl: 3    traZODone (DESYREL) 150 mg tablet, Take 1 tablet (150 mg total) by mouth daily at bedtime, Disp: 30 tablet, Rfl: 2    triamterene-hydrochlorothiazide (DYAZIDE) 37 5-25 mg per capsule, Take 1 capsule by mouth every morning 1 daily for 3 days when needed for edema, Disp: 30 capsule, Rfl: 0    Ventolin  (90 Base) MCG/ACT inhaler, Inhale 1 puff every 6 (six) hours as needed for shortness of breath, Disp: 1 Inhaler, Rfl: 11    ZEMAIRA infusion, once a week , Disp: , Rfl:     predniSONE 10 mg tablet, Take 4 tabs x 3 days x 3 tabs x 3 days then 2 tabs x 3 days then 1 tabs x 3 days, Disp: 60 tablet, Rfl: 1    tamsulosin (FLOMAX) 0 4 mg, TAKE ONE CAPSULE BY MOUTH ONE TIME DAILY , Disp: 90 capsule, Rfl: 0    Allergies   Allergen Reactions    Chantix [Varenicline]      Caused prostate infection       Social History     Tobacco Use    Smoking status: Former Smoker     Packs/day: 1 00     Years: 60 00     Pack years: 60 00     Quit date: 8/31/2017     Years since quitting: 3 9    Smokeless tobacco: Never Used    Tobacco comment: quit in august 2017   Substance Use Topics    Alcohol use: Not Currently     Comment: stopped in 2009         Family History   Problem Relation Age of Onset    Emphysema Mother         never smoked    Emphysema Father     Cancer Brother         colon    Colon cancer Brother     Ulcerative colitis Family     Liver disease Family        Review of Systems   Constitutional: Negative for chills, fever and unexpected weight change  HENT: Negative for congestion, rhinorrhea and sore throat  Eyes: Negative for discharge and redness  Respiratory: Positive for shortness of breath  Cardiovascular: Negative for chest pain, palpitations and leg swelling  Gastrointestinal: Negative for abdominal distention, abdominal pain and nausea  Endocrine: Negative for polydipsia and polyphagia  Genitourinary: Negative for dysuria  Musculoskeletal: Negative for joint swelling and myalgias  Skin: Negative for rash  Neurological: Negative for light-headedness  Psychiatric/Behavioral: Negative for decreased concentration  Vitals:    08/26/21 0902   BP: 130/62   Pulse: 77   Resp: 12   Temp: 97 7 °F (36 5 °C)   SpO2: 97%     Ht 6'5'' weight  175#  BMI  20  75#      Physical Exam  Vitals reviewed  Constitutional:       General: He is not in acute distress  Appearance: Normal appearance  He is well-developed  Comments: underweight   HENT:      Head: Normocephalic  Nose: Nose normal       Mouth/Throat:      Mouth: Mucous membranes are moist       Pharynx: Oropharynx is clear  No oropharyngeal exudate  Eyes:      Conjunctiva/sclera: Conjunctivae normal       Pupils: Pupils are equal, round, and reactive to light  Cardiovascular:      Rate and Rhythm: Normal rate and regular rhythm  Heart sounds: Normal heart sounds  Pulmonary:      Effort: Pulmonary effort is normal       Comments: Lung sounds are clear, no wheezing, crackles, or rhonchi  Abdominal:      General: There is no distension  Palpations: Abdomen is soft  Tenderness: There is no abdominal tenderness  Musculoskeletal:         General: No swelling  Cervical back: Neck supple  Lymphadenopathy:      Cervical: No cervical adenopathy  Skin:     General: Skin is warm and dry  Neurological:      Mental Status: He is alert and oriented to person, place, and time  Psychiatric:         Mood and Affect: Mood normal          Thought Content:  Thought content normal

## 2021-08-27 ENCOUNTER — TELEPHONE (OUTPATIENT)
Dept: FAMILY MEDICINE CLINIC | Facility: CLINIC | Age: 76
End: 2021-08-27

## 2021-08-27 ENCOUNTER — EVALUATION (OUTPATIENT)
Dept: PHYSICAL THERAPY | Facility: CLINIC | Age: 76
End: 2021-08-27
Payer: MEDICARE

## 2021-08-27 DIAGNOSIS — R26.9 GAIT DISORDER: ICD-10-CM

## 2021-08-27 DIAGNOSIS — R53.81 PHYSICAL DECONDITIONING: Primary | ICD-10-CM

## 2021-08-27 DIAGNOSIS — J43.8 OTHER EMPHYSEMA (HCC): ICD-10-CM

## 2021-08-27 DIAGNOSIS — J96.11 CHRONIC RESPIRATORY FAILURE WITH HYPOXIA (HCC): ICD-10-CM

## 2021-08-27 DIAGNOSIS — J43.2 CENTRILOBULAR EMPHYSEMA (HCC): ICD-10-CM

## 2021-08-27 DIAGNOSIS — E88.01 AAT (ALPHA-1-ANTITRYPSIN) DEFICIENCY (HCC): Primary | ICD-10-CM

## 2021-08-27 PROCEDURE — 97530 THERAPEUTIC ACTIVITIES: CPT | Performed by: PHYSICAL THERAPIST

## 2021-08-27 PROCEDURE — 97140 MANUAL THERAPY 1/> REGIONS: CPT | Performed by: PHYSICAL THERAPIST

## 2021-08-27 NOTE — TELEPHONE ENCOUNTER
Just let me know what exactly needs to be written or sign a faxed order from DME to execute  Joshua Connors

## 2021-08-27 NOTE — PROGRESS NOTES
PT Re-Evaluation     Today's date: 2021  Patient name: Slime Cabral  : 1945  MRN: [id number]  Referring provider: Cookie Garcia MD  Dx:   Encounter Diagnosis     ICD-10-CM    1  Physical deconditioning  R53 81    2  Gait disorder  R26 9        Start Time: 915  Stop Time: 1000  Total time in clinic (min): 45 minutes    Assessment  Assessment details: Slime Cabral has been compliant with attending PT since initial eval  Wendi Loo has made improvements in objective data since initial eval but continues to have limitations compared to prior level of function  He has improved his 6MWT by 100 ft and reduced his TUG time by 6 seconds  Wendi Loo continues to have deficits in the above listed impairments and would benefit from additional skilled PT to address these deficits to return to prior level of function  He remains unable to ambulate longer than about 6-10 minutes before requiring a seated rest break and becoming very exhausted  He will benefit from a continuation of skilled PT to work on his endurance, strength and balance       Impairments: abnormal gait, abnormal or restricted ROM, activity intolerance, impaired balance, impaired physical strength, safety issue, weight-bearing intolerance, poor posture  and poor body mechanics    Symptom irritability: highBarriers to therapy:    Understanding of Dx/Px/POC: fair   Prognosis: fair    Goals  (STG) Impairment Goals 4-6 weeks  - Decrease pain to 2/10 (met)  - Improve knee AROM to equal to the unaffected lower extremity (discharged)  - Increase knee strength to 4+/5 throughout (progressing)  - TUG time <by 10 seconds (met)  - TUG time < to 20" with RW (met)    (LTG) Functional Goals 6-8 weeks  - Return to Prior Level of Function (progressing)  - Patient will be independent with HEP (progressing)  - Patient will be able to squat without increased pain/compensation/difficulty (progressing)  - Patient will be able to perform sit to stand independently, without increased pain/compensation/difficulty (progressing)  - Patient will be able to ascend and descend stairs without increased pain/compensation/difficulty (discharged)  - Patient will ambulate with LRAD with normal gait speed and no LOB (progressing)  - Patient will improve Tinetti score to 20/28 (progressing)  - Patient will improve 6MWT by 50ft (met)  - Patient will improve 6MWT to 500ft (initial)  - Patient will be able to ambulate into clinic and out of clinic with no rest breaks (initial)    Plan  Patient would benefit from: skilled physical therapy  Planned modality interventions: thermotherapy: hydrocollator packs and cryotherapy  Planned therapy interventions: abdominal trunk stabilization, balance, balance/weight bearing training, body mechanics training, flexibility, functional ROM exercises, gait training, home exercise program, transfer training, therapeutic exercise, therapeutic activities, stretching, strengthening, postural training, patient education and neuromuscular re-education  Frequency: 3x week  Duration in weeks: 8  Treatment plan discussed with: patient        Subjective Evaluation    History of Present Illness  Mechanism of injury: Initial evaluation (7/2/21):  HOME LIFE: Pt lives at home by himself  He has 2 stairs to get into his home through the front door and only one step to get in through the garage  PLOF:  Pt reports having multiple falls/week and using cane/walker for all ambulation  HISTORY OF CURRENT INJURY: Pt reports that a few months ago he did have some home physical therapy but this ended a few months ago but he ran out of visits and he has not received therapy in a few months now  Pt reports that he has not been in the hospital for a few months, he believes it was January  Pt reports that if he sits for too long, he cannot even pick his R leg up  He is also getting some pain in the R side of his neck   He reports that he uses his cane and walker throughout the day when he has to be on his feet  Says he has been using his cane/walker for about a year and a half, since he had a fall onto his R side  Says that his most recent fall was about 3 days ago  Says that his fall is usually due to lightheadedness vs loss of balance  He feels that his R leg is what limits him most when trying to walk and move  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  PAIN LOCATION/DESCRIPTORS: Pt reports having pain in his R leg and sometimes R neck  PATIENT GOALS: Pt would like to be able to walk better and to get rid of some of the pain in his R LE  Re-evaluation (8/3/21): Pt feels that since he began PT, he is now able to walk around his house easier, he is able to stand for longer and he "feels that everything has improved a little"  Pt reports that he does not have difficulty with getting out of a chair because he has his chair heightened  He has some difficulty with getting in and out of bed  He feels that he would probably only be able to walk for about 10 minutes  He does feel that the pain in his R leg is improving, it is now 7/10 at worst, vs 10/10  He has not had any falls since beginning PT  Re-evaluation (21): Since last re-evaluation, pt feels that his walking has improved and he is able to get around his house more and he is able to walk for longer periods  He notes that "getting out of a chair is a workout" but that he does feel his legs are getting stronger overall  Getting in and out of bed is no problem  He has not had any falls since beginning PT  Pain in the R leg is now 0/10 and he notes this has resolved completely  He feels that his chief issue at this time is his endurance and that if he tries to go shopping at a store he becomes very fatigued and often needs to ask for help to get back to his car     Quality of life: good    Pain  Current pain ratin  At best pain ratin  At worst pain ratin    Social Support  Steps to enter house: yes  Stairs in house: no   Lives in: [place] house  Lives with: alone    Treatments  Previous treatment: physical therapy  Discharged from (in last 30 days): home health care  Patient Goals  Patient goals for therapy: improved balance  Patient goal: To be able to walk longer distances        Objective     Strength/Myotome Testing     Left Hip   Planes of Motion   Flexion: 4-    Right Hip   Planes of Motion   Flexion: 4-    Left Knee   Flexion: 4- (painful)  Extension: 4- (painful)    Right Knee   Flexion: 4-  Extension: 4-    Left Ankle/Foot   Dorsiflexion: 3+  Plantar flexion: 3+    Right Ankle/Foot   Dorsiflexion: 4-  Plantar flexion: 4-    Ambulation     Comments   Initial Evaluation:  Sit to stand- Min-ModA with use of bessie arm rests  Gait- slow shuffling with use of SPC, decreased step length, forefoot strike, slow speed with moderately kyphotic posture and decreased knee and hip extension (SpO2 level 91 following sit to stand with short walk)  TUG- 43 seconds with use of cane, CGA to get out of chair and during test (SpO2 96 post test)   strength: R23# L7#    Re-evaluation:  Sit to stand- CGA with use of bessie arm rests  Gait- slow shuffling with use of RW or cane, decreased step length on R which becomes more notable as he fatigues, forefoot strike bessie, slow speed with moderately kyphotic posture and decreased knee and hip extension  TUG- 21 seconds with use of RW, CGA (27 seconds at last re-assessment)  Tinetti- 18/28   Strength R 25# L 8#  6MWT- 450 ft w/RW (350ft w/RW at last re-assessment)                Diagnosis: Gross deconditioning/ gait abnormality   Precautions: Fall risk   Primary Goals: 1) improve LE strength 2) gait training 3) decrease R leg pain   *asterisks by exercise = given for HEP   Manuals 8/17 8/18 8/20 8/24 8/27                                           There Ex        Bike 3 mins 5 mins 8 mins 6 mins    Walking lap Walked from car into clinic; walked from clinic back to car post session Walked from car into clinic; walked from clinic back to car post session  Walked from car into clinic with [facility]; walked to car form clinic with [facility] post session    Standing endurance        Standing marches 2x10   2x10 1 5#    Standing HR    2x10 1 5#    sidestepping        Standing hip extension 2x10   2x10 1 5#    Standing knee flexion    2x10 1 5#    Mini squats    2x10    Standing abduction 2x10   2x10 1 5#    Standing extensions  christopher 5# 20x      Standing rows  christopher 6# 30x Christopher 6# 30x     Sit to stands        Step ups 6" 10x fwd, ea 8x lat up/over       Neuro Re-Ed        DL stance on air-ex        Tandem stance        hurdles 2 laps fwd/lateral at mirror       Air-ex beams        Postural re-education                                                                 Re-evaluation      LCB    Ther Act                             Modalities                                               SPO2 pre session 97, 90-96 during session and post session

## 2021-08-27 NOTE — PROGRESS NOTES
Received request from PT for a relate walker  Faxed to number that was provided by PT  Fax gave "failed" message  Outreach to PT  Suggested to try again as that is the correct fax number

## 2021-08-27 NOTE — TELEPHONE ENCOUNTER
Physical therapist, Karla Ayala, called advising patienbt would benefit from a rollator as he gets tired when walking and does not have anywhere to sit to rest  She would like an Rx written  When completed, please fax to: [phone]    PT will also need some help figuring out which [employer] company to get this through  Ernestina Putnam- could you help with that?

## 2021-08-28 NOTE — ASSESSMENT & PLAN NOTE
Oxygen 3 liters/minute on continuous basis   When he leaves the house he uses his pulse dose oxygen concentrator go up to 5 L per when needed

## 2021-08-28 NOTE — ASSESSMENT & PLAN NOTE
Has severe emphysema  Last spirometry showed FEV1 1 8 L or 45% predicted  Continue with nebulizer Perforomist 20 mcg b i d  budesonide 0 5 b i d  Does have nebulizer DuoNeb again use up to 4 times a day as needed  I gave him emergency pack of prednisone to keep on hand if needed he will start 40 mg daily and taper by 10 mg every 4th day  I also gave him sample of Spiriva Respimat 2 5 mcg to try 2 puffs once a day in the to see if this helps his breathing    I can give additional samples if needed

## 2021-08-28 NOTE — ASSESSMENT & PLAN NOTE
Continue with weekly infusion of alpha 1 proteinase inhibitor    He has been doing this for several years

## 2021-09-01 ENCOUNTER — OFFICE VISIT (OUTPATIENT)
Dept: PHYSICAL THERAPY | Facility: CLINIC | Age: 76
End: 2021-09-01
Payer: MEDICARE

## 2021-09-01 DIAGNOSIS — R26.9 GAIT DISORDER: ICD-10-CM

## 2021-09-01 DIAGNOSIS — R53.81 PHYSICAL DECONDITIONING: Primary | ICD-10-CM

## 2021-09-01 PROCEDURE — 97110 THERAPEUTIC EXERCISES: CPT | Performed by: PHYSICAL THERAPIST

## 2021-09-01 PROCEDURE — 97530 THERAPEUTIC ACTIVITIES: CPT | Performed by: PHYSICAL THERAPIST

## 2021-09-01 NOTE — PROGRESS NOTES
Daily Note     Today's date: 2021  Patient name: Brigid Hidalgo  : 1945  MRN: [id number]  Referring provider: Cynthia Pike MD  Dx:   Encounter Diagnosis     ICD-10-CM    1  Physical deconditioning  R53 81    2  Gait disorder  R26 9        Start Time: 845  Stop Time: 930  Total time in clinic (min): 45 minutes    Subjective: Pt reports that he is feeling tired today and that he didn't sleep well last night  Offers no new complaints  Objective: See treatment diary below      Assessment: Tolerated treatment well  Patient demonstrated fatigue post treatment, exhibited good technique with therapeutic exercises and would benefit from continued PT  Pt did not walk into or out of the clinic today due to heavy rain  We did do some walking through the building, as well as some standing strengthening exercises which he tolerated well  He was able to do more with less seated rest breaks today  We will progress as able  Plan: Continue per plan of care  Progress treatment as tolerated         Diagnosis: Gross deconditioning/ gait abnormality   Precautions: Fall risk   Primary Goals: 1) improve LE strength 2) gait training 3) decrease R leg pain   *asterisks by exercise = given for HEP   Manuals                                            There Ex        Bike  5 mins 8 mins 6 mins    Walking lap 2 laps in lobby Walked from car into clinic; walked from clinic back to car post session  Walked from car into clinic with [facility]; walked to car form clinic with [facility] post session    Standing endurance        Standing marches 2x10 1 5#   2x10 1 5#    Standing HR    2x10 1 5#    sidestepping        Standing hip extension 2x10 1 5#   2x10 1 5#    Standing knee flexion 2x10 1 5#   2x10 1 5#    Mini squats    2x10    Standing abduction 2x10 1 5#   2x10 1 5#    Standing extensions 2x10 1 5# tamiko 5# 20x      Standing rows  tamiko 6# 30x Honaker 6# 30x     Sit to stands        Step ups 6" 10x fwd/lat leading with ea LE       Neuro Re-Ed        DL stance on air-ex        Tandem stance        hurdles        Air-ex beams        Postural re-education                                                                 Re-evaluation      LCB    Ther Act                             Modalities                                               SPO2 pre session 97, 96 during session and post session

## 2021-09-02 ENCOUNTER — OFFICE VISIT (OUTPATIENT)
Dept: PHYSICAL THERAPY | Facility: CLINIC | Age: 76
End: 2021-09-02
Payer: MEDICARE

## 2021-09-02 DIAGNOSIS — I10 ESSENTIAL HYPERTENSION: ICD-10-CM

## 2021-09-02 DIAGNOSIS — R26.9 GAIT DISORDER: ICD-10-CM

## 2021-09-02 DIAGNOSIS — R53.81 PHYSICAL DECONDITIONING: Primary | ICD-10-CM

## 2021-09-02 PROCEDURE — 97530 THERAPEUTIC ACTIVITIES: CPT | Performed by: PHYSICAL THERAPIST

## 2021-09-02 PROCEDURE — 97112 NEUROMUSCULAR REEDUCATION: CPT | Performed by: PHYSICAL THERAPIST

## 2021-09-02 PROCEDURE — 97110 THERAPEUTIC EXERCISES: CPT | Performed by: PHYSICAL THERAPIST

## 2021-09-02 RX ORDER — AMLODIPINE BESYLATE 10 MG/1
TABLET ORAL
Qty: 30 TABLET | Refills: 0 | Status: SHIPPED | OUTPATIENT
Start: 2021-09-02 | End: 2021-10-06

## 2021-09-02 NOTE — PROGRESS NOTES
Daily Note     Today's date: 2021  Patient name: Slime Cabral  : 1945  MRN: [id number]  Referring provider: Cookie Garcia MD  Dx:   Encounter Diagnosis     ICD-10-CM    1  Physical deconditioning  R53 81    2  Gait disorder  R26 9        Start Time:   Stop Time: 935  Total time in clinic (min): 45 minutes    Subjective: Pt reports that he is very tired today because he did not sleep at all last night  Reports that he has not been able to sleep for a while  Objective: See treatment diary below      Assessment: Tolerated treatment well  Patient demonstrated fatigue post treatment and would benefit from continued PT  Today we focused on some balance and strength training exercises that we have not completed in a few weeks  He did well with these but was very fatigued post session  He requires seated rest breaks on his way into and out of the clinic  I continue to encourage him to stand up straighter during exercises and to be more conscious of his posture  We will continue to work on general conditioning and strengthening as he is able to tolerate  Plan: Continue per plan of care  Progress treatment as tolerated         Diagnosis: Gross deconditioning/ gait abnormality   Precautions: Fall risk   Primary Goals: 1) improve LE strength 2) gait training 3) decrease R leg pain   *asterisks by exercise = given for HEP   Manuals                                            There Ex        Bike  6 mins 8 mins 6 mins    Walking lap 2 laps in lobby Walked from car into clinic with RW; walked from clinic back to car post session with RW  Walked from car into clinic with [address]; walked to car form clinic with [address] post session    Standing endurance        Standing marches 2x10 1 5#   2x10 1 5#    Standing HR    2x10 1 5#    sidestepping        Standing hip extension 2x10 1 5#   2x10 1 5#    Standing knee flexion 2x10 1 5#   2x10 1 5#    Mini squats    2x10    Standing abduction 2x10 1 5#   2x10 1 5#    Standing extensions 2x10 1 5#       Standing rows   [place] 6# 30x     Sit to stands  Chair + 2 air-ex 2x5      Step ups 6" 10x fwd/lat leading with ea LE       Neuro Re-Ed        DL stance on air-ex        Tandem stance        hurdles  2 laps sidestepping  3 laps fwd w/cane and railing      Air-ex beams  1 lap sidestepping      Postural re-education                                                                 Re-evaluation      LCB    Ther Act                             Modalities                                               SPO2 pre session 97, 96 during session and post session

## 2021-09-03 ENCOUNTER — OFFICE VISIT (OUTPATIENT)
Dept: PHYSICAL THERAPY | Facility: CLINIC | Age: 76
End: 2021-09-03
Payer: MEDICARE

## 2021-09-03 DIAGNOSIS — R53.81 PHYSICAL DECONDITIONING: Primary | ICD-10-CM

## 2021-09-03 DIAGNOSIS — R26.9 GAIT DISORDER: ICD-10-CM

## 2021-09-03 PROCEDURE — 97116 GAIT TRAINING THERAPY: CPT

## 2021-09-03 PROCEDURE — 97110 THERAPEUTIC EXERCISES: CPT

## 2021-09-03 NOTE — PROGRESS NOTES
Daily Note     Today's date: 9/3/2021  Patient name: Nguyen Carlin  : 1945  MRN: [id number]  Referring provider: Angie Gasca MD  Dx:   Encounter Diagnosis     ICD-10-CM    1  Physical deconditioning  R53 81    2  Gait disorder  R26 9                   Subjective: Pt reports not being good today due to not sleeping last night  Objective: See treatment diary below      Assessment: Tolerated treatment fair  Patient would benefit from continued PT  Fatigues quickly, pt needs frequent rests throughout session  Plan: Continue per plan of care        Diagnosis: Gross deconditioning/ gait abnormality   Precautions: Fall risk   Primary Goals: 1) improve LE strength 2) gait training 3) decrease R leg pain   *asterisks by exercise = given for HEP   Manuals 9/1 9/2 8/20 8/24 9/3                                           There Ex        Bike  6 mins 8 mins 6 mins 6 min   Walking lap 2 laps in lobby Walked from car into clinic with RW; walked from clinic back to car post session with   Walked from car into clinic with [facility]; walked to car form clinic with [facility] post session Walked from car into clinic with RW; walked from clinic back to car post session with RW   Standing endurance        Standing marches 2x10 1 5#   2x10 1 5# 2x10 1 5#   Standing HR    2x10 1 5#    sidestepping        Standing hip extension 2x10 1 5#   2x10 1 5# 2x10 1 5#   Standing knee flexion 2x10 1 5#   2x10 1 5# 2x10 1 5#   Mini squats    2x10    Standing abduction 2x10 1 5#   2x10 1 5# 2x10 1 5#   Standing extensions 2x10 1 5#       Standing rows   [place] 6# 30x     Sit to stands  Chair + 2 air-ex 2x5      Step ups 6" 10x fwd/lat leading with ea LE       Neuro Re-Ed        DL stance on air-ex        Tandem stance        hurdles  2 laps sidestepping  3 laps fwd w/cane and railing      Air-ex beams  1 lap sidestepping      Postural re-education                                                                 Re-evaluation          Ther Act                             Modalities                                               SPO2 pre session 97, 96 during session and post session

## 2021-09-07 ENCOUNTER — OFFICE VISIT (OUTPATIENT)
Dept: PHYSICAL THERAPY | Facility: CLINIC | Age: 76
End: 2021-09-07
Payer: MEDICARE

## 2021-09-07 DIAGNOSIS — R26.9 GAIT DISORDER: ICD-10-CM

## 2021-09-07 DIAGNOSIS — R53.81 PHYSICAL DECONDITIONING: Primary | ICD-10-CM

## 2021-09-07 PROCEDURE — 97110 THERAPEUTIC EXERCISES: CPT | Performed by: PHYSICAL THERAPIST

## 2021-09-07 PROCEDURE — 97112 NEUROMUSCULAR REEDUCATION: CPT | Performed by: PHYSICAL THERAPIST

## 2021-09-07 NOTE — PROGRESS NOTES
Daily Note     Today's date: 2021  Patient name: Reynold Choe  : 1945  MRN: [id number]  Referring provider: Osvaldo Yo MD  Dx:   Encounter Diagnosis     ICD-10-CM    1  Physical deconditioning  R53 81    2  Gait disorder  R26 9        Start Time: 1215  Stop Time: 1300  Total time in clinic (min): 45 minutes    Subjective: Pt reports that he is feeling tired today but offers no new complaints  Says he was fatigued following last session, as he did a lot during session  Objective: See treatment diary below      Assessment: Tolerated treatment well  Patient demonstrated fatigue post treatment and would benefit from continued PT  Today we worked on some strengthening exercises through step ups and sit to stands which he was fatigued following  He was a bit slower while walking into and out of the clinic and required seated rest breaks in and out  We will continue to progress as able  Plan: Continue per plan of care  Progress treatment as tolerated         Diagnosis: Gross deconditioning/ gait abnormality   Precautions: Fall risk   Primary Goals: 1) improve LE strength 2) gait training 3) decrease R leg pain   *asterisks by exercise = given for HEP   Manuals 9/7 9/2 8/20 8/24 9/3                                           There Ex        Bike 6 mins 6 mins 8 mins 6 mins 6 min   Walking lap Walked from car into clinic with RW; walked from clinic back to car post session with RW Walked from car into clinic with RW; walked from clinic back to car post session with RW  Walked from car into clinic with [facility]; walked to car form clinic with [facility] post session Walked from car into clinic with RW; walked from clinic back to car post session with RW   Standing endurance        Standing marches    2x10 1 5# 2x10 1 5#   Standing HR    2x10 1 5#    sidestepping        Standing hip extension    2x10 1 5# 2x10 1 5#   Standing knee flexion    2x10 1 5# 2x10 1 5#   Mini squats    2x10    Standing abduction 2x10 1 5# 2x10 1 5#   Standing extensions        Standing rows   [place] 6# 30x     Sit to stands  Chair + 2 air-ex 2x5      Step ups 6" 10x fwd leading with ea LE       Neuro Re-Ed        DL stance on air-ex 2 mins, narrow stance       Tandem stance        hurdles  2 laps sidestepping  3 laps fwd w/cane and railing      Air-ex beams  1 lap sidestepping      Postural re-education                                                                 Re-evaluation          Ther Act                             Modalities                                               SPO2 pre session 96, 96 during session and post session

## 2021-09-08 ENCOUNTER — APPOINTMENT (OUTPATIENT)
Dept: PHYSICAL THERAPY | Facility: CLINIC | Age: 76
End: 2021-09-08
Payer: MEDICARE

## 2021-09-09 ENCOUNTER — OFFICE VISIT (OUTPATIENT)
Dept: PHYSICAL THERAPY | Facility: CLINIC | Age: 76
End: 2021-09-09
Payer: MEDICARE

## 2021-09-09 DIAGNOSIS — R26.9 GAIT DISORDER: ICD-10-CM

## 2021-09-09 DIAGNOSIS — R53.81 PHYSICAL DECONDITIONING: Primary | ICD-10-CM

## 2021-09-09 PROCEDURE — 97110 THERAPEUTIC EXERCISES: CPT | Performed by: PHYSICAL THERAPIST

## 2021-09-09 NOTE — PROGRESS NOTES
Daily Note     Today's date: 2021  Patient name: Georg Primrose  : 1945  MRN: [id number]  Referring provider: Deric Mckenzie MD  Dx:   Encounter Diagnosis     ICD-10-CM    1  Physical deconditioning  R53 81    2  Gait disorder  R26 9        Start Time: 1045  Stop Time: 1130  Total time in clinic (min): 45 minutes    Subjective: Pt reports that he is feeling tired today, but that he hopes to be able to go to the grocery store after session to  something  Objective: See treatment diary below      Assessment: Tolerated treatment well  Patient demonstrated fatigue post treatment and would benefit from continued PT  Pt was able to perform many strengthening exercises today, with less rest breaks required overall  He was able to make it from his car into the clinic without a break, but did require one on the way out  He was able to maintain a more erect posture during standing strengthening exercises  We will progress as able  Plan: Continue per plan of care  Progress treatment as tolerated         Diagnosis: Gross deconditioning/ gait abnormality   Precautions: Fall risk   Primary Goals: 1) improve LE strength 2) gait training 3) decrease R leg pain   *asterisks by exercise = given for HEP   Manuals 9/7 9/9 8/20 8/24 9/3                                           There Ex        Bike 6 mins 6 mins 8 mins 6 mins 6 min   Walking lap Walked from car into clinic with RW; walked from clinic back to car post session with RW Walked from car into clinic with RW; walked from clinic back to car post session with RW  Walked from car into clinic with [facility]; walked to car form clinic with [facility] post session Walked from car into clinic with RW; walked from clinic back to car post session with RW   Standing endurance        Standing marches  2x10 1 5#  2x10 1 5# 2x10 1 5#   Standing HR  2x10 1 5#  2x10 1 5#    sidestepping        Standing hip extension    2x10 1 5# 2x10 1 5#   Standing knee flexion  2x10 1 5#  2x10 1 5# 2x10 1 5#   Mini squats    2x10    Standing abduction  2x10 1 5#  2x10 1 5# 2x10 1 5#   Standing extensions        Standing rows   [place] 6# 30x     Sit to stands        Step ups 6" 10x fwd leading with ea LE 6" 10x fwd leading with ea LE      Neuro Re-Ed        DL stance on air-ex 2 mins, narrow stance       Tandem stance        hurdles        Air-ex beams        Postural re-education                                                                 Re-evaluation          Ther Act                             Modalities                                               SPO2 pre session 96, 94-96 during session and post session

## 2021-09-10 ENCOUNTER — APPOINTMENT (OUTPATIENT)
Dept: PHYSICAL THERAPY | Facility: CLINIC | Age: 76
End: 2021-09-10
Payer: MEDICARE

## 2021-09-13 ENCOUNTER — TELEPHONE (OUTPATIENT)
Dept: FAMILY MEDICINE CLINIC | Facility: CLINIC | Age: 76
End: 2021-09-13

## 2021-09-13 NOTE — TELEPHONE ENCOUNTER
Chris Collins, patient's home infusion nurse called and wanted to let his doctor know that patient's blood pressure 90/50 and patient is falling asleep as he is talking to her  Emi Isabel is very concerned that patient might fall because of his b/p being so low  Chris Collins wondering if the medication amlodipine could be decrease or even if patient could stop this medication  You may call Chris Collins back at [phone]

## 2021-09-14 ENCOUNTER — OFFICE VISIT (OUTPATIENT)
Dept: PHYSICAL THERAPY | Facility: CLINIC | Age: 76
End: 2021-09-14
Payer: MEDICARE

## 2021-09-14 DIAGNOSIS — R26.9 GAIT DISORDER: ICD-10-CM

## 2021-09-14 DIAGNOSIS — R53.81 PHYSICAL DECONDITIONING: Primary | ICD-10-CM

## 2021-09-14 PROCEDURE — 97530 THERAPEUTIC ACTIVITIES: CPT | Performed by: PHYSICAL THERAPIST

## 2021-09-14 PROCEDURE — 97110 THERAPEUTIC EXERCISES: CPT | Performed by: PHYSICAL THERAPIST

## 2021-09-14 NOTE — PROGRESS NOTES
Daily Note     Today's date: 2021  Patient name: Haja Kinney  : 1945  MRN: [id number]  Referring provider: Jazlyn Parker MD  Dx:   Encounter Diagnosis     ICD-10-CM    1  Physical deconditioning  R53 81    2  Gait disorder  R26 9        Start Time: 1215  Stop Time: 1300  Total time in clinic (min): 45 minutes    Subjective: Pt reports that he is feeling very tired and weak today and he is unsure why  Says that he had a hard time getting out of his chair last night  Says that he has been working with his rollator that was delivered on Friday  Objective: See treatment diary below      Assessment: Tolerated treatment fair  Patient demonstrated fatigue post treatment and would benefit from continued PT  Pt was very fatigued during session today, requiring longer rest breaks and taking longer to ambulate in and out of clinic vs usual  His O2 sats stayed above 93 entire session  He will bring his rollator next session to begin to practice with  Plan: Continue per plan of care  Progress treatment as tolerated         Diagnosis: Gross deconditioning/ gait abnormality   Precautions: Fall risk   Primary Goals: 1) improve LE strength 2) gait training 3) decrease R leg pain   *asterisks by exercise = given for HEP   Manuals 9/7 9/9 9/14 8/24 9/3                                           There Ex        Bike 6 mins 6 mins  6 mins 6 min   Walking lap Walked from car into clinic with RW; walked from clinic back to car post session with RW Walked from car into clinic with RW; walked from clinic back to car post session with RW Walked from car into clinic with RW; walked from clinic back to car post session with RW Walked from car into clinic with [facility]; walked to car form clinic with [facility] post session Walked from car into clinic with RW; walked from clinic back to car post session with RW   Standing endurance        Standing marches  2x10 1 5# 2x10 1 5# 2x10 1 5# 2x10 1 5#   Standing HR  2x10 1 5# 2x10 1 5# 2x10 1 5#    sidestepping        Standing hip extension   2x10 1 5# 2x10 1 5# 2x10 1 5#   Standing knee flexion  2x10 1 5# 2x10 1 5# 2x10 1 5# 2x10 1 5#   Mini squats    2x10    Standing abduction  2x10 1 5# 2x10 1 5# 2x10 1 5# 2x10 1 5#   Standing extensions   2x10 1 5#     Standing rows        Sit to stands        Step ups 6" 10x fwd leading with ea LE 6" 10x fwd leading with ea LE      Neuro Re-Ed        DL stance on air-ex 2 mins, narrow stance       Tandem stance        hurdles        Air-ex beams        Postural re-education                                                                 Re-evaluation          Ther Act                             Modalities                                               SPO2 pre session 95, 93-96 during session and post session

## 2021-09-17 ENCOUNTER — OFFICE VISIT (OUTPATIENT)
Dept: PHYSICAL THERAPY | Facility: CLINIC | Age: 76
End: 2021-09-17
Payer: MEDICARE

## 2021-09-17 DIAGNOSIS — R26.9 GAIT DISORDER: ICD-10-CM

## 2021-09-17 DIAGNOSIS — R53.81 PHYSICAL DECONDITIONING: Primary | ICD-10-CM

## 2021-09-17 PROCEDURE — 97110 THERAPEUTIC EXERCISES: CPT | Performed by: PHYSICAL THERAPIST

## 2021-09-17 PROCEDURE — 97530 THERAPEUTIC ACTIVITIES: CPT | Performed by: PHYSICAL THERAPIST

## 2021-09-17 NOTE — PROGRESS NOTES
Daily Note     Today's date: 2021  Patient name: Axel Castillo  : 1945  MRN: [id number]  Referring provider: Vijay Wall MD  Dx:   Encounter Diagnosis     ICD-10-CM    1  Physical deconditioning  R53 81    2  Gait disorder  R26 9        Start Time: 830  Stop Time: 915  Total time in clinic (min): 45 minutes    Subjective: Pt reports that he is still feeling very tired today and weak overall  He brings his new rollator to use for session  Says that he has been trying to use it as able  Objective: See treatment diary below      Assessment: Tolerated treatment fair  Patient demonstrated fatigue post treatment and would benefit from continued PT  Today we were able to gait train with pt's new rollator  He did well with this and seemed to be able to walk a bit smoother vs 2 the wheeled walker  He remains very fatigued and tired during and post session overall  We will continue to progress as able  Plan: Continue per plan of care  Progress treatment as tolerated         Diagnosis: Gross deconditioning/ gait abnormality   Precautions: Fall risk   Primary Goals: 1) improve LE strength 2) gait training 3) decrease R leg pain   *asterisks by exercise = given for HEP   Manuals 9/7 9/9 9/14 9/17 9/3                                           There Ex        Bike 6 mins 6 mins  6 mins 6 min   Walking lap Walked from car into clinic with RW; walked from clinic back to car post session with RW Walked from car into clinic with RW; walked from clinic back to car post session with RW Walked from car into clinic with RW; walked from clinic back to car post session with RW Walked from car into clinic with rollator; walked from clinic back to car post session with rollator Walked from car into clinic with RW; walked from clinic back to car post session with RW   Standing endurance        Standing marches  2x10 1 5# 2x10 1 5#  2x10 1 5#   Standing HR  2x10 1 5# 2x10 1 5#     sidestepping        Standing hip extension   2x10 1 5#  2x10 1 5#   Standing knee flexion  2x10 1 5# 2x10 1 5#  2x10 1 5#   Mini squats        Standing abduction  2x10 1 5# 2x10 1 5#  2x10 1 5#   Standing extensions   2x10 1 5#     Standing rows        Sit to stands    Chair + 2 air-ex 2x5    Step ups 6" 10x fwd leading with ea LE 6" 10x fwd leading with ea LE  6" 10x fwd leading with ea LE    Neuro Re-Ed        DL stance on air-ex 2 mins, narrow stance       Tandem stance        hurdles        Air-ex beams        Postural re-education                                                                 Re-evaluation          Ther Act          gait training with rollator     8 mins              Modalities                                              SPO2 pre session 95, 93-96 during session and post session

## 2021-09-20 ENCOUNTER — OFFICE VISIT (OUTPATIENT)
Dept: PHYSICAL THERAPY | Facility: CLINIC | Age: 76
End: 2021-09-20
Payer: MEDICARE

## 2021-09-20 DIAGNOSIS — R53.81 PHYSICAL DECONDITIONING: Primary | ICD-10-CM

## 2021-09-20 DIAGNOSIS — R26.9 GAIT DISORDER: ICD-10-CM

## 2021-09-20 PROCEDURE — 97110 THERAPEUTIC EXERCISES: CPT | Performed by: PHYSICAL THERAPIST

## 2021-09-20 PROCEDURE — 97530 THERAPEUTIC ACTIVITIES: CPT | Performed by: PHYSICAL THERAPIST

## 2021-09-20 NOTE — PROGRESS NOTES
Daily Note     Today's date: 2021  Patient name: Linda Reich  : 1945  MRN: [id number]  Referring provider: Cheryal Klinefelter, MD  Dx:   Encounter Diagnosis     ICD-10-CM    1  Physical deconditioning  R53 81    2  Gait disorder  R26 9        Start Time: 1315  Stop Time: 1400  Total time in clinic (min): 45 minutes    Subjective: Pt reports that he is feeling very tired today overall  Says that he did not do much over the weekend  He has been keeping his rollator in his car but has not had to use it outside of PT  Objective: See treatment diary below      Assessment: Tolerated treatment fair  Patient demonstrated fatigue post treatment, exhibited good technique with therapeutic exercises and would benefit from continued PT  Today pt was about to perform the leg press for both double leg and single leg left squats  The weight used was challenging for him, but able to perform with good form and mechanics  He is becoming more accustomed to using his rollator, but still requires occasional cues to lock/unlock  We will progress with strengthening and endurance as able  He remains very fatigued and slow-moving overall  Plan: Continue per plan of care  Progress treatment as tolerated         Diagnosis: Gross deconditioning/ gait abnormality   Precautions: Fall risk   Primary Goals: 1) improve LE strength 2) gait training 3) decrease R leg pain   *asterisks by exercise = given for HEP   Manuals                                            There Ex        Bike 6 mins 6 mins  6 mins    Walking lap Walked from car into clinic with RW; walked from clinic back to car post session with RW Walked from car into clinic with RW; walked from clinic back to car post session with RW Walked from car into clinic with RW; walked from clinic back to car post session with Mauri Quinn from car into clinic with rollator; walked from clinic back to car post session with rollator Walked from car into clinic with rollator; walked from clinic back to car post session with rollator   Standing endurance        Standing marches  2x10 1 5# 2x10 1 5#     Standing HR  2x10 1 5# 2x10 1 5#     sidestepping        Standing hip extension   2x10 1 5#     Standing knee flexion  2x10 1 5# 2x10 1 5#     Mini squats        Standing abduction  2x10 1 5# 2x10 1 5#     Standing extensions   2x10 1 5#     Leg press     DL 60# 2x10  SL R 30# 10x   Standing rows        Sit to stands    Chair + 2 air-ex 2x5 Chair + 2 air-ex 2x5   Step ups 6" 10x fwd leading with ea LE 6" 10x fwd leading with ea LE  6" 10x fwd leading with ea LE 6" 10x fwd leading with ea LE   Neuro Re-Ed        DL stance on air-ex 2 mins, narrow stance       Tandem stance        hurdles        Air-ex beams        Postural re-education                                                                 Re-evaluation          Ther Act          gait training with rollator     8 mins              Modalities                                              SPO2 pre session 95, 93-96 during session and post session

## 2021-09-22 ENCOUNTER — OFFICE VISIT (OUTPATIENT)
Dept: PHYSICAL THERAPY | Facility: CLINIC | Age: 76
End: 2021-09-22
Payer: MEDICARE

## 2021-09-22 DIAGNOSIS — R26.9 GAIT DISORDER: ICD-10-CM

## 2021-09-22 DIAGNOSIS — R53.81 PHYSICAL DECONDITIONING: Primary | ICD-10-CM

## 2021-09-22 PROCEDURE — 97110 THERAPEUTIC EXERCISES: CPT | Performed by: PHYSICAL THERAPIST

## 2021-09-22 NOTE — PROGRESS NOTES
Daily Note     Today's date: 2021  Patient name: Reynold Choe  : 1945  MRN: [id number]  Referring provider: Osvaldo Yo MD  Dx:   Encounter Diagnosis     ICD-10-CM    1  Physical deconditioning  R53 81    2  Gait disorder  R26 9        Start Time: 910  Stop Time: 5480  Total time in clinic (min): 45 minutes    Subjective: Pt reports that he is tired today since he did not get any sleep last night  Says that he has been having a hard time sleeping for the past 2 weeks or so  He had no soreness following last session when the leg press was added  Objective: See treatment diary below      Assessment: Tolerated treatment fair  Patient demonstrated fatigue post treatment and would benefit from continued PT  Pt was able to progress with increased weight for standing strengthening exercises today with good tolerance, but fatigue post session  He has been able to respond positively to the leg press and we increased the weight for double leg squats by 5# today  He remains very fatigued overall and still requires seated rest breaks for the walk into and out of the clinic  We will progress as able  Plan: Continue per plan of care  Progress treatment as tolerated         Diagnosis: Gross deconditioning/ gait abnormality   Precautions: Fall risk   Primary Goals: 1) improve LE strength 2) gait training 3) decrease R leg pain   *asterisks by exercise = given for HEP   Manuals                                            There Ex        Bike  6 mins  6 mins    Walking lap Walked from car into clinic with Rollator; walked from clinic back to car post session with Rollator Walked from car into clinic with RW; walked from clinic back to car post session with RW Walked from car into clinic with RW; walked from clinic back to car post session with Mauri Quinn from car into clinic with rollator; walked from clinic back to car post session with Iza from car into clinic with rollator; walked from clinic back to car post session with rollator   Standing endurance        Standing marches 2x10 2 5# 2x10 1 5# 2x10 1 5#     Standing HR 2x10 2 5# 2x10 1 5# 2x10 1 5#     sidestepping        Standing hip extension   2x10 1 5#     Standing knee flexion 2x10 2 5# 2x10 1 5# 2x10 1 5#     Mini squats        Standing abduction 2x10 2 5# 2x10 1 5# 2x10 1 5#     Standing extensions   2x10 1 5#     Leg press DL 65# 2x10  SL 30# 2x10    DL 60# 2x10  SL R 30# 10x   Standing rows        Sit to stands    Chair + 2 air-ex 2x5 Chair + 2 air-ex 2x5   Step ups 6" 10x fwd leading with ea LE 6" 10x fwd leading with ea LE  6" 10x fwd leading with ea LE 6" 10x fwd leading with ea LE   Neuro Re-Ed        DL stance on air-ex        Tandem stance        hurdles        Air-ex beams        Postural re-education                                                                 Re-evaluation          Ther Act          gait training with rollator     8 mins              Modalities                                              SPO2 pre session 98, 93-97 during session and post session

## 2021-09-25 ENCOUNTER — APPOINTMENT (EMERGENCY)
Dept: RADIOLOGY | Facility: HOSPITAL | Age: 76
End: 2021-09-25
Payer: MEDICARE

## 2021-09-25 ENCOUNTER — HOSPITAL ENCOUNTER (EMERGENCY)
Facility: HOSPITAL | Age: 76
Discharge: HOME | End: 2021-09-25
Attending: EMERGENCY MEDICINE
Payer: MEDICARE

## 2021-09-25 VITALS
TEMPERATURE: 97.5 F | SYSTOLIC BLOOD PRESSURE: 160 MMHG | WEIGHT: 190 LBS | OXYGEN SATURATION: 98 % | HEART RATE: 74 BPM | RESPIRATION RATE: 20 BRPM | DIASTOLIC BLOOD PRESSURE: 77 MMHG | BODY MASS INDEX: 22.53 KG/M2

## 2021-09-25 DIAGNOSIS — R42 DIZZINESS: Primary | ICD-10-CM

## 2021-09-25 DIAGNOSIS — R26.2 AMBULATORY DYSFUNCTION: ICD-10-CM

## 2021-09-25 LAB
ALBUMIN SERPL BCP-MCNC: 4 G/DL (ref 3.5–5)
ALP SERPL-CCNC: 76 U/L (ref 46–116)
ALT SERPL W P-5'-P-CCNC: 30 U/L (ref 12–78)
ANION GAP SERPL CALCULATED.3IONS-SCNC: 12 MMOL/L (ref 4–13)
APTT PPP: 32 SECONDS (ref 23–37)
AST SERPL W P-5'-P-CCNC: 18 U/L (ref 5–45)
BASOPHILS # BLD AUTO: 0.05 THOUSANDS/ΜL (ref 0–0.1)
BASOPHILS NFR BLD AUTO: 1 % (ref 0–1)
BILIRUB SERPL-MCNC: 1.25 MG/DL (ref 0.2–1)
BUN SERPL-MCNC: 17 MG/DL (ref 5–25)
CALCIUM SERPL-MCNC: 9.2 MG/DL (ref 8.3–10.1)
CHLORIDE SERPL-SCNC: 107 MMOL/L (ref 100–108)
CO2 SERPL-SCNC: 27 MMOL/L (ref 21–32)
CREAT SERPL-MCNC: 1.32 MG/DL (ref 0.6–1.3)
EOSINOPHIL # BLD AUTO: 0.11 THOUSAND/ΜL (ref 0–0.61)
EOSINOPHIL NFR BLD AUTO: 2 % (ref 0–6)
ERYTHROCYTE [DISTWIDTH] IN BLOOD BY AUTOMATED COUNT: 13.9 % (ref 11.6–15.1)
GFR SERPL CREATININE-BSD FRML MDRD: 52 ML/MIN/1.73SQ M
GLUCOSE SERPL-MCNC: 92 MG/DL (ref 65–140)
GLUCOSE SERPL-MCNC: 97 MG/DL (ref 65–140)
HCT VFR BLD AUTO: 40.4 % (ref 36.5–49.3)
HGB BLD-MCNC: 13.7 G/DL (ref 12–17)
IMM GRANULOCYTES # BLD AUTO: 0.03 THOUSAND/UL (ref 0–0.2)
IMM GRANULOCYTES NFR BLD AUTO: 1 % (ref 0–2)
INR PPP: 1.05 (ref 0.84–1.19)
LYMPHOCYTES # BLD AUTO: 2.19 THOUSANDS/ΜL (ref 0.6–4.47)
LYMPHOCYTES NFR BLD AUTO: 34 % (ref 14–44)
MCH RBC QN AUTO: 32.2 PG (ref 26.8–34.3)
MCHC RBC AUTO-ENTMCNC: 33.9 G/DL (ref 31.4–37.4)
MCV RBC AUTO: 95 FL (ref 82–98)
MONOCYTES # BLD AUTO: 0.47 THOUSAND/ΜL (ref 0.17–1.22)
MONOCYTES NFR BLD AUTO: 7 % (ref 4–12)
NEUTROPHILS # BLD AUTO: 3.55 THOUSANDS/ΜL (ref 1.85–7.62)
NEUTS SEG NFR BLD AUTO: 55 % (ref 43–75)
NRBC BLD AUTO-RTO: 0 /100 WBCS
PLATELET # BLD AUTO: 151 THOUSANDS/UL (ref 149–390)
PMV BLD AUTO: 9.4 FL (ref 8.9–12.7)
POTASSIUM SERPL-SCNC: 3.9 MMOL/L (ref 3.5–5.3)
PROT SERPL-MCNC: 6.6 G/DL (ref 6.4–8.2)
PROTHROMBIN TIME: 13.5 SECONDS (ref 11.6–14.5)
RBC # BLD AUTO: 4.25 MILLION/UL (ref 3.88–5.62)
SARS-COV-2 RNA RESP QL NAA+PROBE: NEGATIVE
SODIUM SERPL-SCNC: 146 MMOL/L (ref 136–145)
TROPONIN I SERPL-MCNC: <0.02 NG/ML
WBC # BLD AUTO: 6.4 THOUSAND/UL (ref 4.31–10.16)

## 2021-09-25 PROCEDURE — 93005 ELECTROCARDIOGRAM TRACING: CPT

## 2021-09-25 PROCEDURE — 96374 THER/PROPH/DIAG INJ IV PUSH: CPT

## 2021-09-25 PROCEDURE — 99284 EMERGENCY DEPT VISIT MOD MDM: CPT | Performed by: EMERGENCY MEDICINE

## 2021-09-25 PROCEDURE — 70498 CT ANGIOGRAPHY NECK: CPT

## 2021-09-25 PROCEDURE — G0425 INPT/ED TELECONSULT30: HCPCS | Performed by: PSYCHIATRY & NEUROLOGY

## 2021-09-25 PROCEDURE — 84484 ASSAY OF TROPONIN QUANT: CPT | Performed by: EMERGENCY MEDICINE

## 2021-09-25 PROCEDURE — 36415 COLL VENOUS BLD VENIPUNCTURE: CPT | Performed by: EMERGENCY MEDICINE

## 2021-09-25 PROCEDURE — U0005 INFEC AGEN DETEC AMPLI PROBE: HCPCS | Performed by: EMERGENCY MEDICINE

## 2021-09-25 PROCEDURE — 80053 COMPREHEN METABOLIC PANEL: CPT | Performed by: EMERGENCY MEDICINE

## 2021-09-25 PROCEDURE — U0003 INFECTIOUS AGENT DETECTION BY NUCLEIC ACID (DNA OR RNA); SEVERE ACUTE RESPIRATORY SYNDROME CORONAVIRUS 2 (SARS-COV-2) (CORONAVIRUS DISEASE [COVID-19]), AMPLIFIED PROBE TECHNIQUE, MAKING USE OF HIGH THROUGHPUT TECHNOLOGIES AS DESCRIBED BY CMS-2020-01-R: HCPCS | Performed by: EMERGENCY MEDICINE

## 2021-09-25 PROCEDURE — 82948 REAGENT STRIP/BLOOD GLUCOSE: CPT

## 2021-09-25 PROCEDURE — 99285 EMERGENCY DEPT VISIT HI MDM: CPT

## 2021-09-25 PROCEDURE — 85730 THROMBOPLASTIN TIME PARTIAL: CPT | Performed by: EMERGENCY MEDICINE

## 2021-09-25 PROCEDURE — 97162 PT EVAL MOD COMPLEX 30 MIN: CPT

## 2021-09-25 PROCEDURE — 85025 COMPLETE CBC W/AUTO DIFF WBC: CPT | Performed by: EMERGENCY MEDICINE

## 2021-09-25 PROCEDURE — 85610 PROTHROMBIN TIME: CPT | Performed by: EMERGENCY MEDICINE

## 2021-09-25 PROCEDURE — 96361 HYDRATE IV INFUSION ADD-ON: CPT

## 2021-09-25 PROCEDURE — 71045 X-RAY EXAM CHEST 1 VIEW: CPT

## 2021-09-25 PROCEDURE — 70496 CT ANGIOGRAPHY HEAD: CPT

## 2021-09-25 RX ORDER — SODIUM CHLORIDE 9 MG/ML
125 INJECTION, SOLUTION INTRAVENOUS CONTINUOUS
Status: DISCONTINUED | OUTPATIENT
Start: 2021-09-25 | End: 2021-09-25 | Stop reason: HOSPADM

## 2021-09-25 RX ORDER — ONDANSETRON 2 MG/ML
4 INJECTION INTRAMUSCULAR; INTRAVENOUS ONCE
Status: COMPLETED | OUTPATIENT
Start: 2021-09-25 | End: 2021-09-25

## 2021-09-25 RX ADMIN — SODIUM CHLORIDE 125 ML/HR: 0.9 INJECTION, SOLUTION INTRAVENOUS at 14:04

## 2021-09-25 RX ADMIN — ONDANSETRON 4 MG: 2 INJECTION INTRAMUSCULAR; INTRAVENOUS at 14:03

## 2021-09-25 RX ADMIN — IOHEXOL 85 ML: 350 INJECTION, SOLUTION INTRAVENOUS at 13:56

## 2021-09-25 NOTE — CASE MANAGEMENT
Case Management Assessment & Discharge Planning Note    Patient name Coleen Valera  Location ED 10/ED 10 MRN [id number]  : 1945 Date 2021       Current Admission Date: 2021  Current Admission Diagnosis:  Dizziness  Previous Admission - Discharge Date:21   LOS (days): 0  Geometric Mean LOS (GMLOS) (days):   Days to GMLOS: Previous Discharge Diagnosis:  There are no discharge diagnoses documented for the most recent discharge  OBJECTIVE:        Bundle(if applicable):    Current admission status: Emergency       Preferred Pharmacy:   [id number] - Brenda Means,  [address]  [address] [facility] [address]  Phone: [phone] Fax: [phone]    [address] [facility] [address]  [address] [address]  Phone: [phone] Fax: [phone]    [address], [address] [facility]  [address]  Phone: [phone] Fax: [phone]    Primary Care Provider: Donnie Rich MD    Primary Insurance: MEDICARE  Secondary Insurance: [facility]    ASSESSMENT:  [address] [facility], [address] [facility]   Primary Phone: [phone] (Mobile)                  Patient Information  Admitted from[de-identified] Home  Mental Status: Alert  During Assessment patient was accompanied by: Not accompanied during assessment  Assessment information provided by[de-identified] Patient  Primary Caregiver: Self  Support Systems: Zahida Am[address] Dr of Residence: [address] [place] [address] do you live in?: [facility] entry access options   Select all that apply : Stairs  Number of steps to enter home : 1  Type of Current Residence: [facility]  Living Arrangements: Lives Alone    Activities of Daily Living Prior to Admission  Functional Status: Independent  Completes ADLs independently?: Yes  Ambulates independently?: Yes  Does patient use assisted devices?: Yes  Assisted Devices (DME) used: Straight Cane  Does patient currently own DME?: Yes  What DME does the patient currently own?: [facility] Silverio Cank[place]illa  Does patient have a history of Outpatient Therapy (PT/OT)?: Yes  Does the patient have a history of Short-Term Rehab?: Yes (Hx at [address] and [employer])  Does patient have a history of HHC?: No         Patient Information Continued  Income Source: Pension/FPC  Does patient have prescription coverage?: Yes  Does patient receive dialysis treatments?: No  Does patient have a history of substance abuse?: No       Means of Transportation  Means of Transport to Appts[de-identified] Drives Self  In the past 12 months, has lack of transportation kept you from medical appointments or from getting medications?: No  In the past 12 months, has lack of transportation kept you from meetings, work, or from getting things needed for daily living?: No  Was application for public transport provided?: No    DISCHARGE DETAILS:    Discharge planning discussed with[de-identified] Patient, Dtr  Freedom of Choice: Yes     Contacts  Patient Contacts: Mis Castaneda  Relationship to Patient[de-identified] Family (Daughter)  Contact Method: Phone  Phone Number: [phone]  Reason/Outcome: Discharge [address]         Is the patient interested in [address] at discharge?: No    DME Referral Provided  Referral made for DME?: No        Discharge Destination Plan[de-identified] Home     Type of Transport: Family     GAY was consulted to the ED to assist in discharge planning  Pt presented to the ED with complaint of dizziness/weakness  Per Attending, there were no acute findings to warrant an IP admission  GAY met with pt to discuss  Pt stating that he hasn't been sleeping as well as he normally does  Reporting some anxiety  GAY asked if pt was having anxiety this morning when he called 911 and he said he was because he wasn't feeling well  Pt stated requesting to stay in the hospital for 1-2 days   SW advised he would not be able to be admitted w/o criteria and offered to workup for possible STR admission instead  Pt said he preferred to go home but would agree if recommended by PT  PT evaluated pt in room and is recommending pt continue receiving OP therapy services  Pt should use his RW going forward instead of SPC until he is feeling better  PT also recommending family to stay with him for a few hours today/tomorrow just to make him feel more comfortable  Pt confirmed he is in agreement with recommendation  Call made to dtr Davidson Baca to discuss above  Davidson aBca confirmed that she would be able to transport pt home and requesting for ED to call her when he was ready for discharge  ED Attending made aware

## 2021-09-25 NOTE — ED PROVIDER NOTES
History  Chief Complaint   Patient presents with    Dizziness     pt repots of feeling lightheaded since 10 am     76yoM hx COPD / alpha one anti-tryspin on home O2, HTN, chronic dizziness, states felt fine yesterday, this AM woke with lightheadedness, constant but worse with standing, no numbness/weakness or recent illness  Prior to Admission Medications   Prescriptions Last Dose Informant Patient Reported? Taking? Diclofenac Sodium (VOLTAREN) 1 %  Self No No   Sig: Apply 2 g topically 4 (four) times a day Apply to R knee   LORazepam (ATIVAN) 1 mg tablet  Self No No   Sig: TAKE ONE TABLET BY MOUTH TWICE DAILY AS NEEDED FOR ANXIETY   OLANZapine (ZyPREXA) 10 mg tablet  Self No No   Sig: Take 1 tablet (10 mg total) by mouth daily at bedtime   OXYGEN-HELIUM IN  Self Yes No   Sig: Inhale 2L at rest- 3L with activity   Ventolin  (90 Base) MCG/ACT inhaler  Self No No   Sig: Inhale 1 puff every 6 (six) hours as needed for shortness of breath   ZEMAIRA infusion  Self Yes No   Sig: once a week    acetaminophen (TYLENOL) 500 mg tablet  Self No No   Sig: Take 2 tablets (1,000 mg total) by mouth every 8 (eight) hours   amLODIPine (NORVASC) 10 mg tablet   No No   Sig: TAKE ONE TABLET BY MOUTH DAILY    budesonide (PULMICORT) 0 5 mg/2 mL nebulizer solution  Self No No   Sig: Take 2 mL (0 5 mg total) by nebulization 2 (two) times a day Rinse mouth after use     busPIRone (BUSPAR) 10 mg tablet  Self No No   Sig: TAKE ONE TABLET BY MOUTH THREE TIMES DAILY    cholestyramine sugar free (QUESTRAN LIGHT) 4 g packet  Self No No   Sig: Take 1 packet (4 g total) by mouth 2 (two) times a day   doxylamine (Unisom SleepTabs) 25 MG tablet  Self Yes No   Sig: Take 25 mg by mouth daily at bedtime as needed for sleep   finasteride (PROSCAR) 5 mg tablet  Self No No   Sig: Take 1 tablet (5 mg total) by mouth every morning   fluticasone (FLONASE) 50 mcg/act nasal spray  Self No No   Si sprays into each nostril daily formoterol (Perforomist) 20 MCG/2ML nebulizer solution  Self No No   Sig: Take 2 mL (20 mcg total) by nebulization 2 (two) times a day   gabapentin (NEURONTIN) 300 mg capsule  Self No No   Sig: TAKE ONE CAPSULE BY MOUTH THREE TIMES DAILY    guaiFENesin (ROBITUSSIN) 100 MG/5ML oral liquid  Self Yes No   Sig: Take 200 mg by mouth 2 (two) times a day   ipratropium-albuterol (DUO-NEB) 0 5-2 5 mg/3 mL nebulizer solution  Self No No   Sig: Take 3 mL by nebulization 4 (four) times a day   midodrine (PROAMATINE) 2 5 mg tablet  Self No No   Sig: Take 1 tablet (2 5 mg total) by mouth 3 (three) times a day   pantoprazole (PROTONIX) 40 mg tablet  Self No No   Sig: Take 1 tablet (40 mg total) by mouth 2 (two) times a day   predniSONE 10 mg tablet   No No   Sig: Take 4 tabs x 3 days x 3 tabs x 3 days then 2 tabs x 3 days then 1 tabs x 3 days   tamsulosin (FLOMAX) 0 4 mg   No No   Sig: TAKE ONE CAPSULE BY MOUTH ONE TIME DAILY    traZODone (DESYREL) 150 mg tablet  Self No No   Sig: Take 1 tablet (150 mg total) by mouth daily at bedtime   triamterene-hydrochlorothiazide (DYAZIDE) 37 5-25 mg per capsule  Self No No   Sig: Take 1 capsule by mouth every morning 1 daily for 3 days when needed for edema      Facility-Administered Medications: None       Past Medical History:   Diagnosis Date    Anesthesia complication     Difficult to wake up    Arthritis     BPH (benign prostatic hyperplasia)     urinary frequency    Cancer (HCC)     basal cell neck, face    COPD (chronic obstructive pulmonary disease) (HCC)     COPD exacerbation (HCC) 12/29/2019    Full dentures     Hiatal hernia     History of methicillin resistant staphylococcus aureus (MRSA)     10/11/2018 MRSA (nares) positive    Hypertension     Irritable bowel syndrome     Kidney stone     at least 7 episodes    Liver disease     Alpha 1- enzyme deficiency - diagnosed 2002   has been on weekly replacement therapy since then    Pulmonary emphysema ([facility] [address] )     1/25/15 FEV1 - 2 45 liters or 59% of predicted    RSV infection 2017    Wears glasses     for driving only       Past Surgical History:   Procedure Laterality Date    BACK SURGERY      discectomy    COLONOSCOPY      COLONOSCOPY N/A 3/10/2017    Procedure: Kyaw Emanuel;  Surgeon: Romeo Awan MD;  Location[facility]; Service:    Herlinda Cabot      removal of kidney stones    ESOPHAGOGASTRODUODENOSCOPY N/A 3/10/2017    Procedure: ESOPHAGOGASTRODUODENOSCOPY (EGD); Surgeon: Romeo Awan MD;  Location: [facility] GI LAB; Service:     LITHOTRIPSY      SD ESOPHAGOGASTRODUODENOSCOPY TRANSORAL DIAGNOSTIC N/A 2018    Procedure: ESOPHAGOGASTRODUODENOSCOPY (EGD); Surgeon: Romeo Awan MD;  Location: [facility] GI LAB; Service: Gastroenterology    TONSILLECTOMY      VEIN LIGATION AND STRIPPING Bilateral     18's       Family History   Problem Relation Age of Onset    Emphysema Mother         never smoked    Emphysema Father     Cancer Brother         colon    Colon cancer Brother     Ulcerative colitis Family     Liver disease Family      I have reviewed and agree with the history as documented  E-Cigarette/Vaping    E-Cigarette Use Never User      E-Cigarette/Vaping Substances    Nicotine No     THC No     CBD No     Flavoring No     Other No     Unknown No      Social History     Tobacco Use    Smoking status: Former Smoker     Packs/day: 1 00     Years: 60 00     Pack years: 60 00     Quit date: 2017     Years since quittin 0    Smokeless tobacco: Never Used    Tobacco comment: quit in 2017   Vaping Use    Vaping Use: Never used   Substance Use Topics    Alcohol use: Not Currently     Comment: stopped in     Drug use: Not Currently       Review of Systems   Constitutional: Negative for fever  Respiratory: Negative for cough  Gastrointestinal: Negative for abdominal pain  Musculoskeletal: Negative for back pain     Neurological: Negative for headaches  All other systems reviewed and are negative  Physical Exam  Physical Exam  Vitals reviewed  Constitutional:       Appearance: He is well-developed  HENT:      Head: Normocephalic and atraumatic  Right Ear: External ear normal       Left Ear: External ear normal       Nose: Nose normal  No rhinorrhea  Mouth/Throat:      Mouth: Mucous membranes are moist    Eyes:      Conjunctiva/sclera: Conjunctivae normal    Cardiovascular:      Rate and Rhythm: Normal rate and regular rhythm  Pulmonary:      Effort: Pulmonary effort is normal       Breath sounds: Normal breath sounds  No wheezing or rales  Abdominal:      Palpations: Abdomen is soft  Tenderness: There is no abdominal tenderness  Musculoskeletal:      Cervical back: Neck supple  Right lower leg: No edema  Left lower leg: No edema  Skin:     General: Skin is warm and dry  Neurological:      Mental Status: He is alert and oriented to person, place, and time  Cranial Nerves: No cranial nerve deficit  Sensory: No sensory deficit  Motor: No weakness  Coordination: Coordination normal       Gait: Gait abnormal (unable to stand, falls back)        Comments: No nystagmus   Psychiatric:         Mood and Affect: Mood normal          Vital Signs  ED Triage Vitals [09/25/21 1301]   Temperature Pulse Respirations Blood Pressure SpO2   97 7 °F (36 5 °C) 90 18 160/75 98 %      Temp Source Heart Rate Source Patient Position - Orthostatic VS BP Location FiO2 (%)   Tympanic Monitor Lying Right arm --      Pain Score       --           Vitals:    09/25/21 1415 09/25/21 1430 09/25/21 1445 09/25/21 1500   BP: 163/77 165/77 159/74 160/77   Pulse: 75 77 76 74   Patient Position - Orthostatic VS:             Visual Acuity  Visual Acuity      Most Recent Value   L Pupil Size (mm)  3   R Pupil Size (mm)  3          ED Medications  Medications   sodium chloride 0 9 % infusion (0 mL/hr Intravenous Stopped 9/25/21 9182) ondansetron (ZOFRAN) injection 4 mg (4 mg Intravenous Given 9/25/21 1403)   iohexol (OMNIPAQUE) 350 MG/ML injection (SINGLE-DOSE) 85 mL (85 mL Intravenous Given 9/25/21 1356)       Diagnostic Studies  Results Reviewed     Procedure Component Value Units Date/Time    Novel Coronavirus (Covid-19),PCR [facility] - 2 Hour Stat [[id number]]  (Normal) Collected: 09/25/21 1349    Lab Status: Final result Specimen: Nares from Nose Updated: 09/25/21 1458     SARS-CoV-2 Negative    Narrative:      FOR PEDIATRIC PATIENTS - copy/paste COVID Guidelines URL to browser: [email or web address]  [email or web address]    The specimen collection materials, transport medium, and/or testing methodology utilized in the production of these test results have been proven to be reliable in a limited validation with an abbreviated program under the Emergency Utilization Authorization provided by the FDA  Testing reported as "Presumptive positive" will be confirmed with secondary testing to ensure result accuracy  Clinical caution and judgement should be used with the interpretation of these results with consideration of the clinical impression and other laboratory testing  Testing reported as "Positive" or "Negative" has been proven to be accurate according to standard laboratory validation requirements  All testing is performed with control materials showing appropriate reactivity at standard intervals      Troponin I [[id number]]  (Normal) Collected: 09/25/21 1349    Lab Status: Final result Specimen: Blood from Arm, Left Updated: 09/25/21 1416     Troponin I <0 02 ng/mL     Comprehensive metabolic panel [[id number]]  (Abnormal) Collected: 09/25/21 1349    Lab Status: Final result Specimen: Blood from Arm, Left Updated: 09/25/21 1411     Sodium 146 mmol/L      Potassium 3 9 mmol/L      Chloride 107 mmol/L      CO2 27 mmol/L      ANION GAP 12 mmol/L      BUN 17 mg/dL      Creatinine 1 32 mg/dL      Glucose 92 mg/dL Calcium 9 2 mg/dL      AST 18 U/L      ALT 30 U/L      Alkaline Phosphatase 76 U/L      Total Protein 6 6 g/dL      Albumin 4 0 g/dL      Total Bilirubin 1 25 mg/dL      eGFR 52 ml/min/1 73sq m     Narrative:      [place] guidelines for Chronic Kidney Disease (CKD):     Stage 1 with normal or high GFR (GFR > 90 mL/min/1 73 square meters)    Stage 2 Mild CKD (GFR = 60-89 mL/min/1 73 square meters)    Stage 3A Moderate CKD (GFR = 45-59 mL/min/1 73 square meters)    Stage 3B Moderate CKD (GFR = 30-44 mL/min/1 73 square meters)    Stage 4 Severe CKD (GFR = 15-29 mL/min/1 73 square meters)    Stage 5 End Stage CKD (GFR <15 mL/min/1 73 square meters)  Note: GFR calculation is accurate only with a steady state creatinine    Protime-INR [[id number]]  (Normal) Collected: 09/25/21 1349    Lab Status: Final result Specimen: Blood from Arm, Left Updated: 09/25/21 1407     Protime 13 5 seconds      INR 1 05    APTT [[id number]]  (Normal) Collected: 09/25/21 1349    Lab Status: Final result Specimen: Blood from Arm, Left Updated: 09/25/21 1407     PTT 32 seconds     CBC and differential [[id number]] Collected: 09/25/21 1349    Lab Status: Final result Specimen: Blood from Arm, Left Updated: 09/25/21 1404     WBC 6 40 Thousand/uL      RBC 4 25 Million/uL      Hemoglobin 13 7 g/dL      Hematocrit 40 4 %      MCV 95 fL      MCH 32 2 pg      MCHC 33 9 g/dL      RDW 13 9 %      MPV 9 4 fL      Platelets 826 Thousands/uL      nRBC 0 /100 WBCs      Neutrophils Relative 55 %      Immat GRANS % 1 %      Lymphocytes Relative 34 %      Monocytes Relative 7 %      Eosinophils Relative 2 %      Basophils Relative 1 %      Neutrophils Absolute 3 55 Thousands/µL      Immature Grans Absolute 0 03 Thousand/uL      Lymphocytes Absolute 2 19 Thousands/µL      Monocytes Absolute 0 47 Thousand/µL      Eosinophils Absolute 0 11 Thousand/µL      Basophils Absolute 0 05 Thousands/µL     Fingerstick Glucose (POCT) [[id number]]  (Normal) Collected: 09/25/21 1336    Lab Status: Final result Updated: 09/25/21 1337     POC Glucose 97 mg/dl                  XR chest 1 view   Final Result by Rafael Cho MD (09/25 1649)      No acute cardiopulmonary disease  Workstation performed: [id number]         CTA stroke alert (head/neck)   Final Result by Allison Dalal MD (09/25 1420)      Estimated 63% origin stenosis left ICA  Previously, this was calculated at 69%, slight difference likely technique related  No large vessel flow restrictive disease  Posterior circulation normal       Findings were texted, with acknowledgment to Dory Simms, at 1411 hours  Workstation performed: [id number]         CT stroke alert brain   Final Result by Allison Dalal MD (09/25 1358)      Stable, age-appropriate volume loss with mild degree of chronic small vessel disease  Findings were directly discussed with at   Workstation performed: [id number]                    Procedures  Procedures         ED Course                             SBIRT 20yo+      Most Recent Value   SBIRT (22 yo +)   In order to provide better care to our patients, we are screening all of our patients for alcohol and drug use  Would it be okay to ask you these screening questions? Yes Filed at: 09/25/2021 1457   Initial Alcohol Screen: US AUDIT-C    1  How often do you have a drink containing alcohol?  0 Filed at: 09/25/2021 1457   2  How many drinks containing alcohol do you have on a typical day you are drinking? 0 Filed at: 09/25/2021 1457   3a  Male UNDER 65: How often do you have five or more drinks on one occasion? 0 Filed at: 09/25/2021 1457   3b  FEMALE Any Age, or MALE 65+: How often do you have 4 or more drinks on one occassion? 0 Filed at: 09/25/2021 1457   Audit-C Score  0 Filed at: 09/25/2021 1457   DIANE: How many times in the past year have you       Used an illegal drug or used a prescription medication for non-medical reasons? Never Filed at: 09/25/2021 1457                    [facility]  Number of Diagnoses or Management Options  Dizziness  Diagnosis management comments: Code stroke - CT/CTA neg  Neuro not suspicious for stroke  Seems to be acute on chronic issue, more weakness than vertigo  Consulted CM / PT who got pt up and walking with walker, at baseline  Gives hx to CM suspicious for anxiety attack  Seems to want to stay in the hospital to feel safe  Very low suspicion for acute pathology today  CM discussed with daughter who will keep an eye on him at home  Return to the ER for worsening, f/u PCP 1 wk  Disposition  Final diagnoses:   Dizziness     Time reflects when diagnosis was documented in both MDM as applicable and the Disposition within this note     Time User Action Codes Description Comment    9/25/2021  4:44 PM Geovanna Alicia Add [R42] Dizziness       ED Disposition     ED Disposition Condition Date/Time Comment    Discharge Stable Sat Sep 25, 2021  4:44 PM Violet Brand discharge to home/self care              Follow-up Information     Follow up With Specialties Details Why Contact Info    Paolo Ramey MD Family Medicine In 1 week  [phone] [id number]  [phone]            Discharge Medication List as of 9/25/2021  4:44 PM      CONTINUE these medications which have NOT CHANGED    Details   acetaminophen (TYLENOL) 500 mg tablet Take 2 tablets (1,000 mg total) by mouth every 8 (eight) hours, Starting Tue 2/9/2021, Normal      amLODIPine (NORVASC) 10 mg tablet TAKE ONE TABLET BY MOUTH DAILY , Normal      budesonide (PULMICORT) 0 5 mg/2 mL nebulizer solution Take 2 mL (0 5 mg total) by nebulization 2 (two) times a day Rinse mouth after use , Starting Wed 7/14/2021, Normal      busPIRone (BUSPAR) 10 mg tablet TAKE ONE TABLET BY MOUTH THREE TIMES DAILY , Normal      cholestyramine sugar free (QUESTRAN LIGHT) 4 g packet Take 1 packet (4 g total) by mouth 2 (two) times a day, Starting Tue 2/9/2021, Normal      Diclofenac Sodium (VOLTAREN) 1 % Apply 2 g topically 4 (four) times a day Apply to R knee, Starting Tue 2/9/2021, Normal      doxylamine (Unisom SleepTabs) 25 MG tablet Take 25 mg by mouth daily at bedtime as needed for sleep, Historical Med      finasteride (PROSCAR) 5 mg tablet Take 1 tablet (5 mg total) by mouth every morning, Starting Thu 9/24/2020, Normal      fluticasone (FLONASE) 50 mcg/act nasal spray 2 sprays into each nostril daily, Starting Mon 7/6/2020, Normal      formoterol (Perforomist) 20 MCG/2ML nebulizer solution Take 2 mL (20 mcg total) by nebulization 2 (two) times a day, Starting Wed 7/14/2021, Normal      gabapentin (NEURONTIN) 300 mg capsule TAKE ONE CAPSULE BY MOUTH THREE TIMES DAILY , Normal      guaiFENesin (ROBITUSSIN) 100 MG/5ML oral liquid Take 200 mg by mouth 2 (two) times a day, Historical Med      ipratropium-albuterol (DUO-NEB) 0 5-2 5 mg/3 mL nebulizer solution Take 3 mL by nebulization 4 (four) times a day, Starting Wed 8/11/2021, Normal      LORazepam (ATIVAN) 1 mg tablet TAKE ONE TABLET BY MOUTH TWICE DAILY AS NEEDED FOR ANXIETY, Normal      midodrine (PROAMATINE) 2 5 mg tablet Take 1 tablet (2 5 mg total) by mouth 3 (three) times a day, Starting Mon 1/4/2021, Normal      OLANZapine (ZyPREXA) 10 mg tablet Take 1 tablet (10 mg total) by mouth daily at bedtime, Starting Tue 8/10/2021, Normal      OXYGEN-HELIUM IN Inhale 2L at rest- 3L with activity, Historical Med      pantoprazole (PROTONIX) 40 mg tablet Take 1 tablet (40 mg total) by mouth 2 (two) times a day, Starting Wed 11/4/2020, Normal      predniSONE 10 mg tablet Take 4 tabs x 3 days x 3 tabs x 3 days then 2 tabs x 3 days then 1 tabs x 3 days, Normal      tamsulosin (FLOMAX) 0 4 mg TAKE ONE CAPSULE BY MOUTH ONE TIME DAILY , Normal      traZODone (DESYREL) 150 mg tablet Take 1 tablet (150 mg total) by mouth daily at bedtime, Starting Tue 8/10/2021, Normal triamterene-hydrochlorothiazide (DYAZIDE) 37 5-25 mg per capsule Take 1 capsule by mouth every morning 1 daily for 3 days when needed for edema, Starting Thu 5/13/2021, Normal      Ventolin  (90 Base) MCG/ACT inhaler Inhale 1 puff every 6 (six) hours as needed for shortness of breath, Starting Thu 9/24/2020, Normal      ZEMAIRA infusion once a week , Starting Thu 12/26/2019, Historical Med           No discharge procedures on file      PDMP Review       Value Time User    PDMP Reviewed  Yes 8/10/2021  8:32 AM Ellen Artis MD          ED Provider  Electronically Signed by           Kaleigh Tim DO  09/25/21 7440

## 2021-09-25 NOTE — PHYSICAL THERAPY NOTE
PT EVALUATION     09/25/21 1320   Note Type   Note type Evaluation   Pain Assessment   Pain Assessment Tool Pain Assessment not indicated - pt denies pain   Home Living   Type of [address] One level;Stairs to enter without rails  (1 step to enter)   Home Equipment Walker;Cane  (Roller and Rollator)   Additional Comments Patient using cane prior to admission   Prior Function   Level of [place] Independent with ADLs and functional mobility   Lives With Alone   Receives Help From Family   ADL Assistance Independent   IADLs Needs assistance   Comments Patient driving and attending outpatient physical therapy  Patient states therapy coming out to his car and helping transport him in to the facility by wheelchair due to decreased endurance walking   Restrictions/Precautions   Other Precautions Fall Risk   General   Additional Pertinent History Chart reviewed, patient seen in ED with onset of dizziness    Patient with extensive medical history with gait dysfunction it prior to ED visit and appears at baseline of function at this time   Family/Caregiver Present No   Cognition   Overall Cognitive Status WFL   Arousal/Participation Cooperative   Orientation Level Oriented X4   Following Commands Follows all commands and directions without difficulty   RLE Assessment   RLE Assessment   (ROM WFL, strength 3+/ 4-)   LLE Assessment   LLE Assessment   (ROM WFL, strength 3+/5)   Coordination   Movements are Fluid and Coordinated 0   Coordination and Movement Description Decreased coordination and gait dysfunction present prior to ED visit patient attending outpatient therapy for treatment the same   Bed Mobility   Supine to Sit 7  Independent   Sit to Supine 7  Independent   Transfers   Sit to Stand 7  Independent   Stand to Sit 7  Independent   Ambulation/Elevation   Gait Assistance   (Supervision to independent)   Additional items Verbal cues  (Cuing for use of walker verses cane as prior to ED visit) Assistive Device Rolling walker   Distance up to 30  feet with numerous changes in direction without balance loss with roller walker  Gait including sidestepping backward walking and high-level activity heel-toe raises standing marching in place high stepping all without balance loss   Balance   Static Sitting Fair +   Dynamic Sitting Fair +   Static Standing Fair +   Dynamic Standing Fair   Ambulatory Fair   Activity Tolerance   Activity Tolerance Patient limited by fatigue   Nurse Made Aware Yes   Assessment   Prognosis Good   Problem List Decreased strength;Decreased range of motion;Decreased endurance; Impaired balance;Decreased mobility; Decreased coordination   Assessment Patient seen for Physical Therapy evaluation  Patient admitted with <principal problem not specified>  Comorbidities affecting patient's physical performance include:AAT deficiency, HTN, COPD, causalgia lower limb, falls, gait dysfunction   Personal factors affecting patient at time of initial evaluation include: ambulating with assistive device, inability to ambulate household distances, inability to navigate community distances, inability to navigate level surfaces without external assistance, inability to perform dynamic tasks in community, limited home support, positive fall history, inability to perform physical activity and inability to perform IADLS   Prior to admission, patient was independent with functional mobility with cane or walker, independent with ADLS, requiring assist for IADLS, ambulating household distance and home with family assist   Please find objective findings from Physical Therapy assessment regarding body systems outlined above with impairments and limitations including weakness, decreased ROM, impaired balance, decreased endurance, impaired coordination, gait deviations and fall risk all present prior to evaluation but exacerbated at this time with lightheadedness    The Barthel Index was used as a functional outcome tool presenting with a score of 75 today indicating moderate limitations of functional mobility and ADLS  Patient's clinical presentation is currently evolving as seen in patient's presentation of vital sign response, changing level of pain, increased fall risk, new onset of impairment of functional mobility, decreased endurance and new onset of weakness  Pt would benefit from continued Physical Therapy treatment to address deficits as defined above and maximize level of functional mobility  As demonstrated by objective findings, the assigned level of complexity for this evaluation is moderate  The patient's AM-PAC Basic Mobility Inpatient Short Form Raw Score is 22, Standardized Score is 47 4  A standardized score greater than 42 9 suggests the patient may benefit from discharge to home  Please also refer to the recommendation of the Physical Therapist for safe discharge planning  Goals   Patient Goals To feel stronger   STG Expiration Date 10/02/21   Short Term Goal #1 Transfers and gait with roller walker independently   Short Term Goal #2 Gait endurance to functional household distances, strength bilateral lower extremities 4/5   LTG Expiration Date 10/09/21   Long Term Goal #1 Return to prior level of independent function in home   Plan   Treatment/Interventions Functional transfer training;LE strengthening/ROM; Therapeutic exercise; Endurance training;Gait training; Compensatory technique education   PT Frequency 2-3x/wk   Recommendation   PT Discharge Recommendation Home with outpatient rehabilitation   Additional Comments Patient attending outpatient physical therapy prior to visit in ED and will continue   Lore [address]   Turning in Bed Without Bedrails 4   Lying on Back to Sitting on Edge of Flat Bed 4   Moving Bed to Chair 4   Standing Up From Chair 4   Walk in Room 3   Climb 3-5 Stairs 3   Basic Mobility Inpatient Raw Score 22   Basic Mobility Standardized Score 47 4   Barthel Index   Feeding 10   Bathing 0   Grooming Score 5   Dressing Score 10   Bladder Score 10   Bowels Score 10   Toilet Use Score 10   Transfers (Bed/Chair) Score 15   Mobility (Level Surface) Score 0   Stairs Score 5   Barthel Index Score 76   Licensure   [place] License Number  Treva Lebron PT [id number]

## 2021-09-25 NOTE — TELEMEDICINE
TeleConsultation - Stroke   Carolann Gould 67 y o  male MRN: [id number]  Unit/Bed#: ED 10 Encounter: [id number]        REQUIRED DOCUMENTATION:     1  This service was provided via Telemedicine  2  Provider located at [facility]  3  TeleMed provider: Tremayne Quigley MD   4  Identify all parties in room with patient during tele consult:  Patient, nurse  5  Patient was then informed that this was a Telemedicine visit and that the exam was being conducted confidentially over secure lines  My office door was closed  No one else was in the room  Patient acknowledged consent and understanding of privacy and security of the Telemedicine visit, and gave us permission to have the assistant stay in the room in order to assist with the history and to conduct the exam   I informed the patient that I have reviewed their record in Epic and presented the opportunity for them to ask any questions regarding the visit today  The patient agreed to participate  Assessment/Plan   Assessment:   1  Generalized fatigue/light headedness, poor sleep, worsening sob than baseline  Unlikely acute cva  Eval for COPD exacerbation however more likely sob worsening is from fatigue/deconditioning as there is no wheezign and no change in his baseline cough  Othostatics negative however there may still be a hydration status component as well as he feels a bit better after IV fluids  No additional need for inpt neurologic workup  No clear metabolic derangements identified  No new meds  2  He has asymptomatic left extracranial carotid stenosis  TPA Decision: low clinical suspicion for acute cva therefore iv tpa administration not being entertained  NIH Stroke scale 0  Plan:   outpt vascular neurology consultation will be needed for the asymptomatic carotid and will need to start antiplatelet therapy 81 mg daily  no need for further neurologic workup  Outpt neurologic eval not needed      History of Present Illness     Reason for Consult / Principal Problem: stroke alert  Patient last known well: yesterday  Stroke alert called: 1:32 pm  Neurology time of arrival: via phone at 1:32 pm  HPI: Slime Cabral is a 67 y o   male who presents with lightheadedness upon waking up at 8 am and then by 10 pm he felt so lightheaded he could barely stand up and called EMS  Also more sob today  No infectious symptoms  He takes baseline 3 L 02 nasal cannula  He states he gets lightheaded from time to time but not like this  During stroke alert today, Ct head unremarkable aside for diffuse generalized volume loss  cta head/neck demonstrating left extracranial carotid stenosis that hasn't changed from Sept 2020 imaging  Inpatient consult to Neurology  Consult performed by: David Coffey MD  Consult ordered by: David Coffey MD          Review of Systems   Per 12 point review occasional cough, poor sleep, back pain, light headedness from time to time, sob, rest negative    Historical Information   Past Medical History:   Diagnosis Date    Anesthesia complication     Difficult to wake up    Arthritis     BPH (benign prostatic hyperplasia)     urinary frequency    Cancer (HCC)     basal cell neck, face    [facility]     COPD exacerbation ([facility] ) 12/29/2019    Full dentures     Hiatal hernia     History of methicillin resistant staphylococcus aureus (MRSA)     10/11/2018 MRSA (nares) positive    Hypertension     Irritable bowel syndrome     Kidney stone     at least 7 episodes    Liver disease     Alpha 1- enzyme deficiency - diagnosed 2002   has been on weekly replacement therapy since then    Pulmonary emphysema ([facility] )     1/25/15  FEV1 - 2 45 liters or 59% of predicted    RSV infection 12/2017    Wears glasses     for driving only     Past Surgical History:   Procedure Laterality Date    BACK SURGERY  2008    discectomy    COLONOSCOPY      COLONOSCOPY N/A 3/10/2017    Procedure: Florian Blank; Surgeon: Enzo Cheng MD;  Location: [facility]; Service:    Estefani Pelaez      removal of kidney stones    ESOPHAGOGASTRODUODENOSCOPY N/A 3/10/2017    Procedure: ESOPHAGOGASTRODUODENOSCOPY (EGD); Surgeon: Enzo Cheng MD;  Location: [facility]; Service:     LITHOTRIPSY      [place] ESOPHAGOGASTRODUODENOSCOPY TRANSORAL DIAGNOSTIC N/A 2018    Procedure: ESOPHAGOGASTRODUODENOSCOPY (EGD); Surgeon: Enzo Cheng MD;  Location: [facility]; Service: Gastroenterology    TONSILLECTOMY      VEIN LIGATION AND STRIPPING Bilateral          Social History   Social History     Substance and Sexual Activity   Alcohol Use Not Currently    Comment: stopped in      Social History     Substance and Sexual Activity   Drug Use Not Currently     E-Cigarette/Vaping    E-Cigarette Use Never User      E-Cigarette/Vaping Substances    Nicotine No     THC No     CBD No     Flavoring No     Other No     Unknown No      Social History     Tobacco Use   Smoking Status Former Smoker    Packs/day: 1 00    Years: 60 00    Pack years: 60 00    Quit date: 2017    Years since quittin 0   Smokeless Tobacco Never Used   Tobacco Comment    quit in 2017     Family History: non-contributory        Meds/Allergies   all current active meds have been reviewed    Allergies   Allergen Reactions    Chantix [Varenicline]      Caused prostate infection       Objective   Vitals:Blood pressure 160/77, pulse 74, temperature 97 5 °F (36 4 °C), resp  rate 20, weight 86 2 kg (190 lb), SpO2 98 %  ,Body mass index is 22 53 kg/m²  Intake/Output Summary (Last 24 hours) at 2021  Last data filed at 2021 1716  Gross per 24 hour   Intake 450 ml   Output --   Net 450 ml       Invasive Devices: Invasive Devices     None                 Physical Exam  Neurologic Exam    NIHSS:  1a Level of Consciousness: 0 = Alert   1b  LOC Questions: 0 = Answers both correctly   1c   LOC Commands: 0 = Obeys both correctly   2  Best Gaze: 0 = Normal   3  Visual: 0 = No visual field loss   4  Facial Palsy: 0=Normal symmetric movement   5a  Motor Right Arm: 0=No drift, limb holds 90 (or 45) degrees for full 10 seconds   5b  Motor Left Arm: 0=No drift, limb holds 90 (or 45) degrees for full 10 seconds   6a  Motor Right Le=No drift, limb holds 90 (or 45) degrees for full 10 seconds   6b  Motor Left Le=No drift, limb holds 90 (or 45) degrees for full 10 seconds   7  Limb Ataxia:  0=Absent   8  Sensory: 0=Normal; no sensory loss   9  Best Language:  0=No aphasia, normal   10  Dysarthria: 0=Normal articulation   11  Extinction and Inattention (formerly Neglect): 0=No abnormality   Total Score: 0         Modified Bryce Score:  0 (No baseline symptoms/disability)    Lab Results: I have personally reviewed pertinent reports  Imaging Studies: I have personally reviewed pertinent films in PACS  EKG, Pathology, and Other Studies: I have personally reviewed pertinent films in PACS    Counseling / Coordination of Care  Total 35 min critical care time spent during the stroke alert

## 2021-09-27 LAB
ATRIAL RATE: 77 BPM
PR INTERVAL: 182 MS
QRS AXIS: 51 DEGREES
QRSD INTERVAL: 102 MS
QT INTERVAL: 428 MS
QTC INTERVAL: 484 MS
T WAVE AXIS: 40 DEGREES
VENTRICULAR RATE: 77 BPM

## 2021-09-27 PROCEDURE — 93010 ELECTROCARDIOGRAM REPORT: CPT | Performed by: INTERNAL MEDICINE

## 2021-09-28 ENCOUNTER — PATIENT OUTREACH (OUTPATIENT)
Dept: FAMILY MEDICINE CLINIC | Facility: CLINIC | Age: 76
End: 2021-09-28

## 2021-09-28 ENCOUNTER — APPOINTMENT (OUTPATIENT)
Dept: PHYSICAL THERAPY | Facility: CLINIC | Age: 76
End: 2021-09-28
Payer: MEDICARE

## 2021-09-28 NOTE — PROGRESS NOTES
Outreach to patient post discharge from ED  LVM requesting return call  CM contact information provided

## 2021-10-01 ENCOUNTER — EVALUATION (OUTPATIENT)
Dept: PHYSICAL THERAPY | Facility: CLINIC | Age: 76
End: 2021-10-01
Payer: MEDICARE

## 2021-10-01 DIAGNOSIS — R53.81 PHYSICAL DECONDITIONING: Primary | ICD-10-CM

## 2021-10-01 DIAGNOSIS — R26.9 GAIT DISORDER: ICD-10-CM

## 2021-10-01 PROCEDURE — 97112 NEUROMUSCULAR REEDUCATION: CPT | Performed by: PHYSICAL THERAPIST

## 2021-10-01 PROCEDURE — 97110 THERAPEUTIC EXERCISES: CPT | Performed by: PHYSICAL THERAPIST

## 2021-10-05 ENCOUNTER — EVALUATION (OUTPATIENT)
Dept: PHYSICAL THERAPY | Facility: CLINIC | Age: 76
End: 2021-10-05
Payer: MEDICARE

## 2021-10-05 DIAGNOSIS — R53.81 PHYSICAL DECONDITIONING: Primary | ICD-10-CM

## 2021-10-05 DIAGNOSIS — R26.9 GAIT DISORDER: ICD-10-CM

## 2021-10-05 PROCEDURE — 97112 NEUROMUSCULAR REEDUCATION: CPT | Performed by: PHYSICAL THERAPIST

## 2021-10-05 PROCEDURE — 97110 THERAPEUTIC EXERCISES: CPT | Performed by: PHYSICAL THERAPIST

## 2021-10-06 DIAGNOSIS — I10 ESSENTIAL HYPERTENSION: ICD-10-CM

## 2021-10-06 RX ORDER — AMLODIPINE BESYLATE 10 MG/1
TABLET ORAL
Qty: 30 TABLET | Refills: 0 | Status: SHIPPED | OUTPATIENT
Start: 2021-10-06 | End: 2021-11-05

## 2021-10-07 ENCOUNTER — OFFICE VISIT (OUTPATIENT)
Dept: PHYSICAL THERAPY | Facility: CLINIC | Age: 76
End: 2021-10-07
Payer: MEDICARE

## 2021-10-07 DIAGNOSIS — R26.9 GAIT DISORDER: ICD-10-CM

## 2021-10-07 DIAGNOSIS — R53.81 PHYSICAL DECONDITIONING: Primary | ICD-10-CM

## 2021-10-07 PROCEDURE — 97110 THERAPEUTIC EXERCISES: CPT | Performed by: PHYSICAL THERAPIST

## 2021-10-07 PROCEDURE — 97112 NEUROMUSCULAR REEDUCATION: CPT | Performed by: PHYSICAL THERAPIST

## 2021-10-12 ENCOUNTER — OFFICE VISIT (OUTPATIENT)
Dept: PALLIATIVE MEDICINE | Facility: CLINIC | Age: 76
End: 2021-10-12
Payer: MEDICARE

## 2021-10-12 VITALS
TEMPERATURE: 97.1 F | HEART RATE: 87 BPM | BODY MASS INDEX: 21.04 KG/M2 | OXYGEN SATURATION: 94 % | RESPIRATION RATE: 18 BRPM | SYSTOLIC BLOOD PRESSURE: 129 MMHG | WEIGHT: 177.4 LBS | DIASTOLIC BLOOD PRESSURE: 60 MMHG

## 2021-10-12 DIAGNOSIS — R63.0 LOSS OF APPETITE: ICD-10-CM

## 2021-10-12 DIAGNOSIS — Z51.5 PALLIATIVE CARE PATIENT: ICD-10-CM

## 2021-10-12 DIAGNOSIS — R45.89 DYSPHORIC MOOD: ICD-10-CM

## 2021-10-12 DIAGNOSIS — K21.9 GASTROESOPHAGEAL REFLUX DISEASE, UNSPECIFIED WHETHER ESOPHAGITIS PRESENT: ICD-10-CM

## 2021-10-12 DIAGNOSIS — J96.11 CHRONIC RESPIRATORY FAILURE WITH HYPOXIA (HCC): Chronic | ICD-10-CM

## 2021-10-12 DIAGNOSIS — G47.00 INSOMNIA, UNSPECIFIED TYPE: ICD-10-CM

## 2021-10-12 DIAGNOSIS — I27.20 PULMONARY HYPERTENSION (HCC): ICD-10-CM

## 2021-10-12 DIAGNOSIS — F41.9 ANXIOUSNESS: ICD-10-CM

## 2021-10-12 DIAGNOSIS — I27.82 OTHER CHRONIC PULMONARY EMBOLISM WITHOUT ACUTE COR PULMONALE (HCC): ICD-10-CM

## 2021-10-12 DIAGNOSIS — I87.2 CHRONIC VENOUS INSUFFICIENCY: ICD-10-CM

## 2021-10-12 DIAGNOSIS — R26.81 UNSTEADY GAIT: Chronic | ICD-10-CM

## 2021-10-12 DIAGNOSIS — E88.01 AAT (ALPHA-1-ANTITRYPSIN) DEFICIENCY (HCC): Chronic | ICD-10-CM

## 2021-10-12 DIAGNOSIS — Z87.891 FORMER SMOKER: ICD-10-CM

## 2021-10-12 DIAGNOSIS — J43.2 CENTRILOBULAR EMPHYSEMA (HCC): Primary | ICD-10-CM

## 2021-10-12 PROCEDURE — 99214 OFFICE O/P EST MOD 30 MIN: CPT | Performed by: INTERNAL MEDICINE

## 2021-10-12 RX ORDER — TRAZODONE HYDROCHLORIDE 100 MG/1
200 TABLET ORAL
Qty: 60 TABLET | Refills: 0 | Status: SHIPPED | OUTPATIENT
Start: 2021-10-12 | End: 2021-11-14

## 2021-10-13 ENCOUNTER — OFFICE VISIT (OUTPATIENT)
Dept: PHYSICAL THERAPY | Facility: CLINIC | Age: 76
End: 2021-10-13
Payer: MEDICARE

## 2021-10-13 DIAGNOSIS — R26.9 GAIT DISORDER: ICD-10-CM

## 2021-10-13 DIAGNOSIS — R53.81 PHYSICAL DECONDITIONING: Primary | ICD-10-CM

## 2021-10-13 PROCEDURE — 97110 THERAPEUTIC EXERCISES: CPT | Performed by: PHYSICAL THERAPIST

## 2021-10-13 PROCEDURE — 97112 NEUROMUSCULAR REEDUCATION: CPT | Performed by: PHYSICAL THERAPIST

## 2021-10-15 ENCOUNTER — OFFICE VISIT (OUTPATIENT)
Dept: PHYSICAL THERAPY | Facility: CLINIC | Age: 76
End: 2021-10-15
Payer: MEDICARE

## 2021-10-15 DIAGNOSIS — R26.9 GAIT DISORDER: ICD-10-CM

## 2021-10-15 DIAGNOSIS — R53.81 PHYSICAL DECONDITIONING: Primary | ICD-10-CM

## 2021-10-15 PROCEDURE — 97530 THERAPEUTIC ACTIVITIES: CPT | Performed by: PHYSICAL THERAPIST

## 2021-10-15 PROCEDURE — 97110 THERAPEUTIC EXERCISES: CPT | Performed by: PHYSICAL THERAPIST

## 2021-10-18 DIAGNOSIS — J43.2 CENTRILOBULAR EMPHYSEMA (HCC): ICD-10-CM

## 2021-10-18 RX ORDER — BUDESONIDE 0.5 MG/2ML
0.5 INHALANT ORAL 2 TIMES DAILY
Qty: 360 ML | Refills: 3 | Status: SHIPPED | OUTPATIENT
Start: 2021-10-18 | End: 2022-01-07

## 2021-10-18 RX ORDER — FORMOTEROL FUMARATE 20 UG/2ML
20 SOLUTION RESPIRATORY (INHALATION) 2 TIMES DAILY
Qty: 360 ML | Refills: 3 | Status: SHIPPED | OUTPATIENT
Start: 2021-10-18 | End: 2022-01-07

## 2021-10-19 ENCOUNTER — OFFICE VISIT (OUTPATIENT)
Dept: PHYSICAL THERAPY | Facility: CLINIC | Age: 76
End: 2021-10-19
Payer: MEDICARE

## 2021-10-19 DIAGNOSIS — R53.81 PHYSICAL DECONDITIONING: Primary | ICD-10-CM

## 2021-10-19 DIAGNOSIS — R26.9 GAIT DISORDER: ICD-10-CM

## 2021-10-19 PROCEDURE — 97110 THERAPEUTIC EXERCISES: CPT | Performed by: PHYSICAL THERAPIST

## 2021-10-21 ENCOUNTER — APPOINTMENT (OUTPATIENT)
Dept: PHYSICAL THERAPY | Facility: CLINIC | Age: 76
End: 2021-10-21
Payer: MEDICARE

## 2021-10-22 ENCOUNTER — OFFICE VISIT (OUTPATIENT)
Dept: PHYSICAL THERAPY | Facility: CLINIC | Age: 76
End: 2021-10-22
Payer: MEDICARE

## 2021-10-22 DIAGNOSIS — R26.9 GAIT DISORDER: ICD-10-CM

## 2021-10-22 DIAGNOSIS — R53.81 PHYSICAL DECONDITIONING: Primary | ICD-10-CM

## 2021-10-22 PROCEDURE — 97110 THERAPEUTIC EXERCISES: CPT | Performed by: PHYSICAL THERAPIST

## 2021-10-22 PROCEDURE — 97112 NEUROMUSCULAR REEDUCATION: CPT | Performed by: PHYSICAL THERAPIST

## 2021-10-22 PROCEDURE — 97530 THERAPEUTIC ACTIVITIES: CPT | Performed by: PHYSICAL THERAPIST

## 2021-10-26 ENCOUNTER — TELEMEDICINE (OUTPATIENT)
Dept: FAMILY MEDICINE CLINIC | Facility: CLINIC | Age: 76
End: 2021-10-26
Payer: MEDICARE

## 2021-10-26 ENCOUNTER — OFFICE VISIT (OUTPATIENT)
Dept: PHYSICAL THERAPY | Facility: CLINIC | Age: 76
End: 2021-10-26
Payer: MEDICARE

## 2021-10-26 DIAGNOSIS — R53.81 PHYSICAL DECONDITIONING: Primary | ICD-10-CM

## 2021-10-26 DIAGNOSIS — Z79.899 USES NEBULIZER AND INHALER AT HOME: Primary | ICD-10-CM

## 2021-10-26 DIAGNOSIS — R26.9 GAIT DISORDER: ICD-10-CM

## 2021-10-26 DIAGNOSIS — J02.9 SORE THROAT: ICD-10-CM

## 2021-10-26 DIAGNOSIS — R53.1 WEAKNESS: ICD-10-CM

## 2021-10-26 PROCEDURE — 97110 THERAPEUTIC EXERCISES: CPT | Performed by: PHYSICAL THERAPIST

## 2021-10-26 PROCEDURE — 99443 PR PHYS/QHP TELEPHONE EVALUATION 21-30 MIN: CPT | Performed by: FAMILY MEDICINE

## 2021-10-26 PROCEDURE — 97530 THERAPEUTIC ACTIVITIES: CPT | Performed by: PHYSICAL THERAPIST

## 2021-10-28 ENCOUNTER — OFFICE VISIT (OUTPATIENT)
Dept: PHYSICAL THERAPY | Facility: CLINIC | Age: 76
End: 2021-10-28
Payer: MEDICARE

## 2021-10-28 DIAGNOSIS — R53.81 PHYSICAL DECONDITIONING: Primary | ICD-10-CM

## 2021-10-28 DIAGNOSIS — R26.9 GAIT DISORDER: ICD-10-CM

## 2021-10-28 PROCEDURE — 97110 THERAPEUTIC EXERCISES: CPT | Performed by: PHYSICAL THERAPIST

## 2021-10-28 PROCEDURE — 97112 NEUROMUSCULAR REEDUCATION: CPT | Performed by: PHYSICAL THERAPIST

## 2021-11-02 ENCOUNTER — OFFICE VISIT (OUTPATIENT)
Dept: PHYSICAL THERAPY | Facility: CLINIC | Age: 76
End: 2021-11-02
Payer: MEDICARE

## 2021-11-02 DIAGNOSIS — J96.11 CHRONIC RESPIRATORY FAILURE WITH HYPOXIA (HCC): ICD-10-CM

## 2021-11-02 DIAGNOSIS — R26.9 GAIT DISORDER: ICD-10-CM

## 2021-11-02 DIAGNOSIS — R53.81 PHYSICAL DECONDITIONING: Primary | ICD-10-CM

## 2021-11-02 PROCEDURE — 97110 THERAPEUTIC EXERCISES: CPT | Performed by: PHYSICAL THERAPIST

## 2021-11-02 PROCEDURE — 97112 NEUROMUSCULAR REEDUCATION: CPT | Performed by: PHYSICAL THERAPIST

## 2021-11-02 PROCEDURE — 97530 THERAPEUTIC ACTIVITIES: CPT | Performed by: PHYSICAL THERAPIST

## 2021-11-03 RX ORDER — BUSPIRONE HYDROCHLORIDE 10 MG/1
TABLET ORAL
Qty: 90 TABLET | Refills: 0 | Status: SHIPPED | OUTPATIENT
Start: 2021-11-03 | End: 2021-11-05

## 2021-11-04 ENCOUNTER — OFFICE VISIT (OUTPATIENT)
Dept: FAMILY MEDICINE CLINIC | Facility: CLINIC | Age: 76
End: 2021-11-04
Payer: MEDICARE

## 2021-11-04 ENCOUNTER — PATIENT OUTREACH (OUTPATIENT)
Dept: FAMILY MEDICINE CLINIC | Facility: CLINIC | Age: 76
End: 2021-11-04

## 2021-11-04 VITALS
SYSTOLIC BLOOD PRESSURE: 114 MMHG | HEART RATE: 85 BPM | DIASTOLIC BLOOD PRESSURE: 66 MMHG | OXYGEN SATURATION: 98 % | RESPIRATION RATE: 22 BRPM | BODY MASS INDEX: 20.9 KG/M2 | TEMPERATURE: 97.6 F | WEIGHT: 177 LBS | HEIGHT: 77 IN

## 2021-11-04 DIAGNOSIS — J02.9 SORE THROAT: ICD-10-CM

## 2021-11-04 DIAGNOSIS — R26.81 UNSTEADY GAIT: ICD-10-CM

## 2021-11-04 DIAGNOSIS — J02.9 PHARYNGITIS, UNSPECIFIED ETIOLOGY: Primary | ICD-10-CM

## 2021-11-04 DIAGNOSIS — R62.7 FAILURE TO THRIVE IN ADULT: ICD-10-CM

## 2021-11-04 DIAGNOSIS — R41.89 COGNITIVE DECLINE: ICD-10-CM

## 2021-11-04 DIAGNOSIS — R53.81 PHYSICAL DECONDITIONING: ICD-10-CM

## 2021-11-04 DIAGNOSIS — R26.2 AMBULATORY DYSFUNCTION: ICD-10-CM

## 2021-11-04 DIAGNOSIS — R25.9 PARKINSONIAN FEATURES: Primary | ICD-10-CM

## 2021-11-04 DIAGNOSIS — R26.89 BALANCE PROBLEM: ICD-10-CM

## 2021-11-04 DIAGNOSIS — R42 LIGHTHEADEDNESS: ICD-10-CM

## 2021-11-04 LAB — S PYO AG THROAT QL: NEGATIVE

## 2021-11-04 PROCEDURE — 87880 STREP A ASSAY W/OPTIC: CPT | Performed by: FAMILY MEDICINE

## 2021-11-04 PROCEDURE — 99213 OFFICE O/P EST LOW 20 MIN: CPT | Performed by: FAMILY MEDICINE

## 2021-11-05 ENCOUNTER — EVALUATION (OUTPATIENT)
Dept: PHYSICAL THERAPY | Facility: CLINIC | Age: 76
End: 2021-11-05
Payer: MEDICARE

## 2021-11-05 DIAGNOSIS — R53.81 PHYSICAL DECONDITIONING: Primary | ICD-10-CM

## 2021-11-05 DIAGNOSIS — R26.9 GAIT DISORDER: ICD-10-CM

## 2021-11-05 PROCEDURE — 97140 MANUAL THERAPY 1/> REGIONS: CPT | Performed by: PHYSICAL THERAPIST

## 2021-11-05 PROCEDURE — 97110 THERAPEUTIC EXERCISES: CPT | Performed by: PHYSICAL THERAPIST

## 2021-11-05 PROCEDURE — 97112 NEUROMUSCULAR REEDUCATION: CPT | Performed by: PHYSICAL THERAPIST

## 2021-11-07 DIAGNOSIS — J43.2 CENTRILOBULAR EMPHYSEMA (HCC): ICD-10-CM

## 2021-11-09 ENCOUNTER — APPOINTMENT (OUTPATIENT)
Dept: PHYSICAL THERAPY | Facility: CLINIC | Age: 76
End: 2021-11-09
Payer: MEDICARE

## 2021-11-09 ENCOUNTER — OFFICE VISIT (OUTPATIENT)
Dept: NEUROLOGY | Facility: CLINIC | Age: 76
End: 2021-11-09
Payer: MEDICARE

## 2021-11-09 VITALS
DIASTOLIC BLOOD PRESSURE: 63 MMHG | WEIGHT: 166 LBS | HEIGHT: 77 IN | TEMPERATURE: 96 F | HEART RATE: 76 BPM | SYSTOLIC BLOOD PRESSURE: 124 MMHG | BODY MASS INDEX: 19.6 KG/M2

## 2021-11-09 DIAGNOSIS — R25.9 PARKINSONIAN FEATURES: ICD-10-CM

## 2021-11-09 DIAGNOSIS — R41.89 COGNITIVE DECLINE: ICD-10-CM

## 2021-11-09 DIAGNOSIS — I65.22 LEFT CAROTID STENOSIS: ICD-10-CM

## 2021-11-09 DIAGNOSIS — R13.12 DYSPHAGIA, OROPHARYNGEAL PHASE: ICD-10-CM

## 2021-11-09 DIAGNOSIS — J44.9 COPD (CHRONIC OBSTRUCTIVE PULMONARY DISEASE) (HCC): ICD-10-CM

## 2021-11-09 DIAGNOSIS — R26.81 UNSTEADY GAIT: ICD-10-CM

## 2021-11-09 DIAGNOSIS — G20 PARKINSON'S DISEASE (HCC): Primary | ICD-10-CM

## 2021-11-09 DIAGNOSIS — R42 LIGHTHEADEDNESS: ICD-10-CM

## 2021-11-09 DIAGNOSIS — R53.81 PHYSICAL DECONDITIONING: ICD-10-CM

## 2021-11-09 DIAGNOSIS — R26.89 BALANCE PROBLEM: ICD-10-CM

## 2021-11-09 DIAGNOSIS — R41.0 EPISODE OF CONFUSION: ICD-10-CM

## 2021-11-09 DIAGNOSIS — R26.2 AMBULATORY DYSFUNCTION: ICD-10-CM

## 2021-11-09 PROCEDURE — 99215 OFFICE O/P EST HI 40 MIN: CPT | Performed by: PSYCHIATRY & NEUROLOGY

## 2021-11-11 ENCOUNTER — TELEPHONE (OUTPATIENT)
Dept: NEUROLOGY | Facility: CLINIC | Age: 76
End: 2021-11-11

## 2021-11-11 ENCOUNTER — DOCUMENTATION (OUTPATIENT)
Dept: SOCIAL WORK | Facility: HOSPITAL | Age: 76
End: 2021-11-11

## 2021-11-12 ENCOUNTER — APPOINTMENT (OUTPATIENT)
Dept: PHYSICAL THERAPY | Facility: CLINIC | Age: 76
End: 2021-11-12
Payer: MEDICARE

## 2021-11-12 ENCOUNTER — PATIENT OUTREACH (OUTPATIENT)
Dept: FAMILY MEDICINE CLINIC | Facility: CLINIC | Age: 76
End: 2021-11-12

## 2021-11-13 DIAGNOSIS — F41.9 ANXIOUSNESS: ICD-10-CM

## 2021-11-13 DIAGNOSIS — G47.00 INSOMNIA, UNSPECIFIED TYPE: ICD-10-CM

## 2021-11-13 DIAGNOSIS — Z51.5 PALLIATIVE CARE PATIENT: ICD-10-CM

## 2021-11-13 DIAGNOSIS — J43.2 CENTRILOBULAR EMPHYSEMA (HCC): ICD-10-CM

## 2021-11-13 DIAGNOSIS — R45.89 DYSPHORIC MOOD: ICD-10-CM

## 2021-11-14 RX ORDER — OLANZAPINE 10 MG/1
TABLET ORAL
Qty: 30 TABLET | Refills: 0 | Status: SHIPPED | OUTPATIENT
Start: 2021-11-14 | End: 2021-12-14 | Stop reason: SDUPTHER

## 2021-11-14 RX ORDER — TRAZODONE HYDROCHLORIDE 100 MG/1
TABLET ORAL
Qty: 60 TABLET | Refills: 0 | Status: SHIPPED | OUTPATIENT
Start: 2021-11-14 | End: 2021-11-22 | Stop reason: ALTCHOICE

## 2021-11-15 ENCOUNTER — TELEPHONE (OUTPATIENT)
Dept: FAMILY MEDICINE CLINIC | Facility: CLINIC | Age: 76
End: 2021-11-15

## 2021-11-16 ENCOUNTER — APPOINTMENT (OUTPATIENT)
Dept: PHYSICAL THERAPY | Facility: CLINIC | Age: 76
End: 2021-11-16
Payer: MEDICARE

## 2021-11-16 ENCOUNTER — DOCUMENTATION (OUTPATIENT)
Dept: FAMILY MEDICINE CLINIC | Facility: CLINIC | Age: 76
End: 2021-11-16

## 2021-11-16 ENCOUNTER — HOSPITAL ENCOUNTER (OUTPATIENT)
Dept: NEUROLOGY | Facility: HOSPITAL | Age: 76
Discharge: HOME | DRG: 191 | End: 2021-11-16
Attending: PSYCHIATRY & NEUROLOGY
Payer: MEDICARE

## 2021-11-16 DIAGNOSIS — R41.0 EPISODE OF CONFUSION: ICD-10-CM

## 2021-11-16 PROCEDURE — 95813 EEG EXTND MNTR 61-119 MIN: CPT | Performed by: PSYCHIATRY & NEUROLOGY

## 2021-11-16 PROCEDURE — 95819 EEG AWAKE AND ASLEEP: CPT

## 2021-11-17 ENCOUNTER — APPOINTMENT (EMERGENCY)
Dept: RADIOLOGY | Facility: HOSPITAL | Age: 76
DRG: 191 | End: 2021-11-17
Payer: MEDICARE

## 2021-11-17 ENCOUNTER — HOSPITAL ENCOUNTER (INPATIENT)
Facility: HOSPITAL | Age: 76
LOS: 1 days | Discharge: NURSING, REHAB OR LONG-TERM CARE | DRG: 191 | End: 2021-11-18
Attending: EMERGENCY MEDICINE | Admitting: FAMILY MEDICINE
Payer: MEDICARE

## 2021-11-17 DIAGNOSIS — J44.1 COPD WITH ACUTE EXACERBATION (HCC): Primary | ICD-10-CM

## 2021-11-17 DIAGNOSIS — I10 ORTHOSTATIC HYPERTENSION: ICD-10-CM

## 2021-11-17 PROBLEM — G20 PARKINSON'S DISEASE (HCC): Status: ACTIVE | Noted: 2021-11-17

## 2021-11-17 LAB
2HR DELTA HS TROPONIN: 3 NG/L
4HR DELTA HS TROPONIN: 4 NG/L
ALBUMIN SERPL BCP-MCNC: 4.2 G/DL (ref 3.5–5)
ALP SERPL-CCNC: 82 U/L (ref 46–116)
ALT SERPL W P-5'-P-CCNC: 23 U/L (ref 12–78)
ANION GAP SERPL CALCULATED.3IONS-SCNC: 9 MMOL/L (ref 4–13)
APTT PPP: 33 SECONDS (ref 23–37)
AST SERPL W P-5'-P-CCNC: 24 U/L (ref 5–45)
BASOPHILS # BLD AUTO: 0.04 THOUSANDS/ΜL (ref 0–0.1)
BASOPHILS NFR BLD AUTO: 1 % (ref 0–1)
BILIRUB SERPL-MCNC: 0.84 MG/DL (ref 0.2–1)
BUN SERPL-MCNC: 17 MG/DL (ref 5–25)
CALCIUM SERPL-MCNC: 9.6 MG/DL (ref 8.3–10.1)
CARDIAC TROPONIN I PNL SERPL HS: 10 NG/L
CARDIAC TROPONIN I PNL SERPL HS: 6 NG/L
CARDIAC TROPONIN I PNL SERPL HS: 9 NG/L
CHLORIDE SERPL-SCNC: 103 MMOL/L (ref 100–108)
CO2 SERPL-SCNC: 26 MMOL/L (ref 21–32)
CREAT SERPL-MCNC: 1.21 MG/DL (ref 0.6–1.3)
D DIMER PPP FEU-MCNC: 0.8 UG/ML FEU
EOSINOPHIL # BLD AUTO: 0.07 THOUSAND/ΜL (ref 0–0.61)
EOSINOPHIL NFR BLD AUTO: 1 % (ref 0–6)
ERYTHROCYTE [DISTWIDTH] IN BLOOD BY AUTOMATED COUNT: 13.2 % (ref 11.6–15.1)
GFR SERPL CREATININE-BSD FRML MDRD: 57 ML/MIN/1.73SQ M
GLUCOSE SERPL-MCNC: 96 MG/DL (ref 65–140)
HCT VFR BLD AUTO: 40.4 % (ref 36.5–49.3)
HGB BLD-MCNC: 14 G/DL (ref 12–17)
IMM GRANULOCYTES # BLD AUTO: 0.03 THOUSAND/UL (ref 0–0.2)
IMM GRANULOCYTES NFR BLD AUTO: 0 % (ref 0–2)
INR PPP: 1.07 (ref 0.84–1.19)
LYMPHOCYTES # BLD AUTO: 2.08 THOUSANDS/ΜL (ref 0.6–4.47)
LYMPHOCYTES NFR BLD AUTO: 27 % (ref 14–44)
MCH RBC QN AUTO: 32.5 PG (ref 26.8–34.3)
MCHC RBC AUTO-ENTMCNC: 34.7 G/DL (ref 31.4–37.4)
MCV RBC AUTO: 94 FL (ref 82–98)
MONOCYTES # BLD AUTO: 0.49 THOUSAND/ΜL (ref 0.17–1.22)
MONOCYTES NFR BLD AUTO: 6 % (ref 4–12)
NEUTROPHILS # BLD AUTO: 4.96 THOUSANDS/ΜL (ref 1.85–7.62)
NEUTS SEG NFR BLD AUTO: 65 % (ref 43–75)
NRBC BLD AUTO-RTO: 0 /100 WBCS
NT-PROBNP SERPL-MCNC: 347 PG/ML
PLATELET # BLD AUTO: 129 THOUSANDS/UL (ref 149–390)
PMV BLD AUTO: 8.6 FL (ref 8.9–12.7)
POTASSIUM SERPL-SCNC: 3.8 MMOL/L (ref 3.5–5.3)
PROT SERPL-MCNC: 7.2 G/DL (ref 6.4–8.2)
PROTHROMBIN TIME: 13.7 SECONDS (ref 11.6–14.5)
RBC # BLD AUTO: 4.31 MILLION/UL (ref 3.88–5.62)
SODIUM SERPL-SCNC: 138 MMOL/L (ref 136–145)
WBC # BLD AUTO: 7.67 THOUSAND/UL (ref 4.31–10.16)

## 2021-11-17 PROCEDURE — 71045 X-RAY EXAM CHEST 1 VIEW: CPT

## 2021-11-17 PROCEDURE — 85025 COMPLETE CBC W/AUTO DIFF WBC: CPT | Performed by: EMERGENCY MEDICINE

## 2021-11-17 PROCEDURE — 93005 ELECTROCARDIOGRAM TRACING: CPT

## 2021-11-17 PROCEDURE — 83880 ASSAY OF NATRIURETIC PEPTIDE: CPT | Performed by: EMERGENCY MEDICINE

## 2021-11-17 PROCEDURE — 99285 EMERGENCY DEPT VISIT HI MDM: CPT

## 2021-11-17 PROCEDURE — 94640 AIRWAY INHALATION TREATMENT: CPT

## 2021-11-17 PROCEDURE — 94760 N-INVAS EAR/PLS OXIMETRY 1: CPT

## 2021-11-17 PROCEDURE — 80053 COMPREHEN METABOLIC PANEL: CPT | Performed by: EMERGENCY MEDICINE

## 2021-11-17 PROCEDURE — G1004 CDSM NDSC: HCPCS

## 2021-11-17 PROCEDURE — 84484 ASSAY OF TROPONIN QUANT: CPT | Performed by: EMERGENCY MEDICINE

## 2021-11-17 PROCEDURE — 85730 THROMBOPLASTIN TIME PARTIAL: CPT | Performed by: EMERGENCY MEDICINE

## 2021-11-17 PROCEDURE — 85610 PROTHROMBIN TIME: CPT | Performed by: EMERGENCY MEDICINE

## 2021-11-17 PROCEDURE — 71275 CT ANGIOGRAPHY CHEST: CPT

## 2021-11-17 PROCEDURE — 96374 THER/PROPH/DIAG INJ IV PUSH: CPT

## 2021-11-17 PROCEDURE — 99284 EMERGENCY DEPT VISIT MOD MDM: CPT | Performed by: EMERGENCY MEDICINE

## 2021-11-17 PROCEDURE — 85379 FIBRIN DEGRADATION QUANT: CPT | Performed by: EMERGENCY MEDICINE

## 2021-11-17 PROCEDURE — 36415 COLL VENOUS BLD VENIPUNCTURE: CPT | Performed by: EMERGENCY MEDICINE

## 2021-11-17 RX ORDER — TAMSULOSIN HYDROCHLORIDE 0.4 MG/1
0.4 CAPSULE ORAL DAILY
Status: DISCONTINUED | OUTPATIENT
Start: 2021-11-18 | End: 2021-11-18 | Stop reason: HOSPADM

## 2021-11-17 RX ORDER — FORMOTEROL FUMARATE 20 UG/2ML
20 SOLUTION RESPIRATORY (INHALATION) 2 TIMES DAILY
Status: DISCONTINUED | OUTPATIENT
Start: 2021-11-17 | End: 2021-11-18 | Stop reason: HOSPADM

## 2021-11-17 RX ORDER — FINASTERIDE 5 MG/1
5 TABLET, FILM COATED ORAL EVERY MORNING
Status: DISCONTINUED | OUTPATIENT
Start: 2021-11-18 | End: 2021-11-18 | Stop reason: HOSPADM

## 2021-11-17 RX ORDER — LORAZEPAM 1 MG/1
1 TABLET ORAL 2 TIMES DAILY PRN
Status: DISCONTINUED | OUTPATIENT
Start: 2021-11-17 | End: 2021-11-18 | Stop reason: HOSPADM

## 2021-11-17 RX ORDER — CHOLESTYRAMINE LIGHT 4 G/5.7G
4 POWDER, FOR SUSPENSION ORAL 2 TIMES DAILY
Status: DISCONTINUED | OUTPATIENT
Start: 2021-11-17 | End: 2021-11-18 | Stop reason: HOSPADM

## 2021-11-17 RX ORDER — TRIAMTERENE AND HYDROCHLOROTHIAZIDE 37.5; 25 MG/1; MG/1
1 TABLET ORAL DAILY
Status: DISCONTINUED | OUTPATIENT
Start: 2021-11-18 | End: 2021-11-18 | Stop reason: HOSPADM

## 2021-11-17 RX ORDER — LORAZEPAM 2 MG/ML
0.5 INJECTION INTRAMUSCULAR ONCE
Status: COMPLETED | OUTPATIENT
Start: 2021-11-17 | End: 2021-11-17

## 2021-11-17 RX ORDER — TRAZODONE HYDROCHLORIDE 50 MG/1
200 TABLET ORAL
Status: DISCONTINUED | OUTPATIENT
Start: 2021-11-17 | End: 2021-11-18 | Stop reason: HOSPADM

## 2021-11-17 RX ORDER — METHYLPREDNISOLONE SODIUM SUCCINATE 40 MG/ML
40 INJECTION, POWDER, LYOPHILIZED, FOR SOLUTION INTRAMUSCULAR; INTRAVENOUS DAILY
Status: DISCONTINUED | OUTPATIENT
Start: 2021-11-18 | End: 2021-11-18

## 2021-11-17 RX ORDER — ALBUTEROL SULFATE 90 UG/1
2 AEROSOL, METERED RESPIRATORY (INHALATION) EVERY 4 HOURS PRN
Status: DISCONTINUED | OUTPATIENT
Start: 2021-11-17 | End: 2021-11-18 | Stop reason: HOSPADM

## 2021-11-17 RX ORDER — PANTOPRAZOLE SODIUM 40 MG/1
40 TABLET, DELAYED RELEASE ORAL 2 TIMES DAILY
Status: DISCONTINUED | OUTPATIENT
Start: 2021-11-17 | End: 2021-11-18 | Stop reason: HOSPADM

## 2021-11-17 RX ORDER — FLUTICASONE PROPIONATE 50 MCG
2 SPRAY, SUSPENSION (ML) NASAL DAILY
Status: DISCONTINUED | OUTPATIENT
Start: 2021-11-18 | End: 2021-11-18 | Stop reason: HOSPADM

## 2021-11-17 RX ORDER — MIDODRINE HYDROCHLORIDE 5 MG/1
2.5 TABLET ORAL 3 TIMES DAILY
Status: CANCELLED | OUTPATIENT
Start: 2021-11-17

## 2021-11-17 RX ORDER — MIDODRINE HYDROCHLORIDE 5 MG/1
2.5 TABLET ORAL 3 TIMES DAILY
Status: DISCONTINUED | OUTPATIENT
Start: 2021-11-18 | End: 2021-11-18

## 2021-11-17 RX ORDER — AMLODIPINE BESYLATE 10 MG/1
10 TABLET ORAL DAILY
Status: DISCONTINUED | OUTPATIENT
Start: 2021-11-18 | End: 2021-11-18 | Stop reason: HOSPADM

## 2021-11-17 RX ORDER — IPRATROPIUM BROMIDE AND ALBUTEROL SULFATE 2.5; .5 MG/3ML; MG/3ML
3 SOLUTION RESPIRATORY (INHALATION) 4 TIMES DAILY
Status: DISCONTINUED | OUTPATIENT
Start: 2021-11-17 | End: 2021-11-18 | Stop reason: HOSPADM

## 2021-11-17 RX ORDER — OLANZAPINE 2.5 MG/1
10 TABLET ORAL
Status: DISCONTINUED | OUTPATIENT
Start: 2021-11-17 | End: 2021-11-18 | Stop reason: HOSPADM

## 2021-11-17 RX ORDER — BUDESONIDE 0.5 MG/2ML
0.5 INHALANT ORAL 2 TIMES DAILY
Status: DISCONTINUED | OUTPATIENT
Start: 2021-11-17 | End: 2021-11-18 | Stop reason: HOSPADM

## 2021-11-17 RX ORDER — BUSPIRONE HYDROCHLORIDE 10 MG/1
10 TABLET ORAL 3 TIMES DAILY
Status: DISCONTINUED | OUTPATIENT
Start: 2021-11-17 | End: 2021-11-18 | Stop reason: HOSPADM

## 2021-11-17 RX ORDER — GABAPENTIN 300 MG/1
300 CAPSULE ORAL 3 TIMES DAILY
Status: DISCONTINUED | OUTPATIENT
Start: 2021-11-17 | End: 2021-11-18 | Stop reason: HOSPADM

## 2021-11-17 RX ADMIN — BUDESONIDE 0.5 MG: 0.5 INHALANT ORAL at 22:02

## 2021-11-17 RX ADMIN — IPRATROPIUM BROMIDE AND ALBUTEROL SULFATE 3 ML: 2.5; .5 SOLUTION RESPIRATORY (INHALATION) at 22:02

## 2021-11-17 RX ADMIN — NYSTATIN 500000 UNITS: 100000 SUSPENSION ORAL at 21:05

## 2021-11-17 RX ADMIN — PANTOPRAZOLE SODIUM 40 MG: 40 TABLET, DELAYED RELEASE ORAL at 21:05

## 2021-11-17 RX ADMIN — FORMOTEROL FUMARATE DIHYDRATE 20 MCG: 20 SOLUTION RESPIRATORY (INHALATION) at 22:07

## 2021-11-17 RX ADMIN — LORAZEPAM 0.5 MG: 2 INJECTION INTRAMUSCULAR; INTRAVENOUS at 15:05

## 2021-11-17 RX ADMIN — CHOLESTYRAMINE 4 G: 4 POWDER, FOR SUSPENSION ORAL at 21:05

## 2021-11-17 RX ADMIN — GABAPENTIN 300 MG: 300 CAPSULE ORAL at 21:05

## 2021-11-17 RX ADMIN — DICLOFENAC SODIUM TOPICAL GEL, 1%, 2 G: 10 GEL TOPICAL at 22:37

## 2021-11-17 RX ADMIN — OLANZAPINE 10 MG: 2.5 TABLET, FILM COATED ORAL at 21:05

## 2021-11-17 RX ADMIN — BUSPIRONE HYDROCHLORIDE 10 MG: 10 TABLET ORAL at 21:05

## 2021-11-17 RX ADMIN — IOHEXOL 85 ML: 350 INJECTION, SOLUTION INTRAVENOUS at 16:05

## 2021-11-18 ENCOUNTER — TELEPHONE (OUTPATIENT)
Dept: OTHER | Facility: OTHER | Age: 76
End: 2021-11-18

## 2021-11-18 VITALS
HEART RATE: 94 BPM | TEMPERATURE: 100.1 F | RESPIRATION RATE: 18 BRPM | OXYGEN SATURATION: 96 % | DIASTOLIC BLOOD PRESSURE: 61 MMHG | BODY MASS INDEX: 19.13 KG/M2 | WEIGHT: 165.34 LBS | SYSTOLIC BLOOD PRESSURE: 104 MMHG | HEIGHT: 78 IN

## 2021-11-18 LAB
ANION GAP SERPL CALCULATED.3IONS-SCNC: 9 MMOL/L (ref 4–13)
ATRIAL RATE: 85 BPM
BASOPHILS # BLD AUTO: 0.06 THOUSANDS/ΜL (ref 0–0.1)
BASOPHILS NFR BLD AUTO: 1 % (ref 0–1)
BUN SERPL-MCNC: 14 MG/DL (ref 5–25)
CALCIUM SERPL-MCNC: 9.1 MG/DL (ref 8.3–10.1)
CHLORIDE SERPL-SCNC: 104 MMOL/L (ref 100–108)
CO2 SERPL-SCNC: 26 MMOL/L (ref 21–32)
CREAT SERPL-MCNC: 1.12 MG/DL (ref 0.6–1.3)
EOSINOPHIL # BLD AUTO: 0.21 THOUSAND/ΜL (ref 0–0.61)
EOSINOPHIL NFR BLD AUTO: 3 % (ref 0–6)
ERYTHROCYTE [DISTWIDTH] IN BLOOD BY AUTOMATED COUNT: 13.2 % (ref 11.6–15.1)
GFR SERPL CREATININE-BSD FRML MDRD: 63 ML/MIN/1.73SQ M
GLUCOSE SERPL-MCNC: 97 MG/DL (ref 65–140)
HCT VFR BLD AUTO: 37.4 % (ref 36.5–49.3)
HGB BLD-MCNC: 12.5 G/DL (ref 12–17)
IMM GRANULOCYTES # BLD AUTO: 0.02 THOUSAND/UL (ref 0–0.2)
IMM GRANULOCYTES NFR BLD AUTO: 0 % (ref 0–2)
LYMPHOCYTES # BLD AUTO: 3.09 THOUSANDS/ΜL (ref 0.6–4.47)
LYMPHOCYTES NFR BLD AUTO: 43 % (ref 14–44)
MAGNESIUM SERPL-MCNC: 2 MG/DL (ref 1.6–2.6)
MCH RBC QN AUTO: 31.8 PG (ref 26.8–34.3)
MCHC RBC AUTO-ENTMCNC: 33.4 G/DL (ref 31.4–37.4)
MCV RBC AUTO: 95 FL (ref 82–98)
MONOCYTES # BLD AUTO: 0.61 THOUSAND/ΜL (ref 0.17–1.22)
MONOCYTES NFR BLD AUTO: 9 % (ref 4–12)
NEUTROPHILS # BLD AUTO: 3.14 THOUSANDS/ΜL (ref 1.85–7.62)
NEUTS SEG NFR BLD AUTO: 44 % (ref 43–75)
NRBC BLD AUTO-RTO: 0 /100 WBCS
PHOSPHATE SERPL-MCNC: 3.8 MG/DL (ref 2.3–4.1)
PLATELET # BLD AUTO: 127 THOUSANDS/UL (ref 149–390)
PMV BLD AUTO: 10.2 FL (ref 8.9–12.7)
POTASSIUM SERPL-SCNC: 3.5 MMOL/L (ref 3.5–5.3)
PR INTERVAL: 150 MS
QRS AXIS: 51 DEGREES
QRSD INTERVAL: 100 MS
QT INTERVAL: 396 MS
QTC INTERVAL: 471 MS
RBC # BLD AUTO: 3.93 MILLION/UL (ref 3.88–5.62)
SODIUM SERPL-SCNC: 139 MMOL/L (ref 136–145)
T WAVE AXIS: 24 DEGREES
VENTRICULAR RATE: 85 BPM
WBC # BLD AUTO: 7.13 THOUSAND/UL (ref 4.31–10.16)

## 2021-11-18 PROCEDURE — 80048 BASIC METABOLIC PNL TOTAL CA: CPT | Performed by: STUDENT IN AN ORGANIZED HEALTH CARE EDUCATION/TRAINING PROGRAM

## 2021-11-18 PROCEDURE — 85025 COMPLETE CBC W/AUTO DIFF WBC: CPT | Performed by: STUDENT IN AN ORGANIZED HEALTH CARE EDUCATION/TRAINING PROGRAM

## 2021-11-18 PROCEDURE — 84100 ASSAY OF PHOSPHORUS: CPT | Performed by: STUDENT IN AN ORGANIZED HEALTH CARE EDUCATION/TRAINING PROGRAM

## 2021-11-18 PROCEDURE — 93010 ELECTROCARDIOGRAM REPORT: CPT | Performed by: INTERNAL MEDICINE

## 2021-11-18 PROCEDURE — 83735 ASSAY OF MAGNESIUM: CPT | Performed by: STUDENT IN AN ORGANIZED HEALTH CARE EDUCATION/TRAINING PROGRAM

## 2021-11-18 PROCEDURE — 97167 OT EVAL HIGH COMPLEX 60 MIN: CPT

## 2021-11-18 PROCEDURE — 99233 SBSQ HOSP IP/OBS HIGH 50: CPT | Performed by: FAMILY MEDICINE

## 2021-11-18 PROCEDURE — NC001 PR NO CHARGE: Performed by: FAMILY MEDICINE

## 2021-11-18 PROCEDURE — 94640 AIRWAY INHALATION TREATMENT: CPT

## 2021-11-18 PROCEDURE — 97163 PT EVAL HIGH COMPLEX 45 MIN: CPT

## 2021-11-18 PROCEDURE — 94760 N-INVAS EAR/PLS OXIMETRY 1: CPT

## 2021-11-18 RX ORDER — POTASSIUM CHLORIDE 20 MEQ/1
20 TABLET, EXTENDED RELEASE ORAL ONCE
Status: COMPLETED | OUTPATIENT
Start: 2021-11-18 | End: 2021-11-18

## 2021-11-18 RX ORDER — MIDODRINE HYDROCHLORIDE 2.5 MG/1
2.5 TABLET ORAL 3 TIMES DAILY
Qty: 90 TABLET | Refills: 0 | Status: SHIPPED | OUTPATIENT
Start: 2021-11-18 | End: 2021-12-02 | Stop reason: SDUPTHER

## 2021-11-18 RX ADMIN — BUDESONIDE 0.5 MG: 0.5 INHALANT ORAL at 07:07

## 2021-11-18 RX ADMIN — DICLOFENAC SODIUM TOPICAL GEL, 1%, 2 G: 10 GEL TOPICAL at 11:33

## 2021-11-18 RX ADMIN — CARBIDOPA AND LEVODOPA 1 TABLET: 25; 100 TABLET ORAL at 11:25

## 2021-11-18 RX ADMIN — BUSPIRONE HYDROCHLORIDE 10 MG: 10 TABLET ORAL at 09:53

## 2021-11-18 RX ADMIN — IPRATROPIUM BROMIDE AND ALBUTEROL SULFATE 3 ML: 2.5; .5 SOLUTION RESPIRATORY (INHALATION) at 07:07

## 2021-11-18 RX ADMIN — NYSTATIN 500000 UNITS: 100000 SUSPENSION ORAL at 09:52

## 2021-11-18 RX ADMIN — PANTOPRAZOLE SODIUM 40 MG: 40 TABLET, DELAYED RELEASE ORAL at 09:53

## 2021-11-18 RX ADMIN — POTASSIUM CHLORIDE 20 MEQ: 1500 TABLET, EXTENDED RELEASE ORAL at 11:25

## 2021-11-18 RX ADMIN — FORMOTEROL FUMARATE DIHYDRATE 20 MCG: 20 SOLUTION RESPIRATORY (INHALATION) at 19:39

## 2021-11-18 RX ADMIN — FINASTERIDE 5 MG: 5 TABLET, FILM COATED ORAL at 09:53

## 2021-11-18 RX ADMIN — CHOLESTYRAMINE 4 G: 4 POWDER, FOR SUSPENSION ORAL at 17:32

## 2021-11-18 RX ADMIN — NYSTATIN 500000 UNITS: 100000 SUSPENSION ORAL at 17:33

## 2021-11-18 RX ADMIN — DICLOFENAC SODIUM TOPICAL GEL, 1%, 2 G: 10 GEL TOPICAL at 17:33

## 2021-11-18 RX ADMIN — PANTOPRAZOLE SODIUM 40 MG: 40 TABLET, DELAYED RELEASE ORAL at 17:33

## 2021-11-18 RX ADMIN — CHOLESTYRAMINE 4 G: 4 POWDER, FOR SUSPENSION ORAL at 09:52

## 2021-11-18 RX ADMIN — GABAPENTIN 300 MG: 300 CAPSULE ORAL at 09:53

## 2021-11-18 RX ADMIN — IPRATROPIUM BROMIDE AND ALBUTEROL SULFATE 3 ML: 2.5; .5 SOLUTION RESPIRATORY (INHALATION) at 16:14

## 2021-11-18 RX ADMIN — BUDESONIDE 0.5 MG: 0.5 INHALANT ORAL at 19:39

## 2021-11-18 RX ADMIN — GABAPENTIN 300 MG: 300 CAPSULE ORAL at 15:42

## 2021-11-18 RX ADMIN — CARBIDOPA AND LEVODOPA 1 TABLET: 25; 100 TABLET ORAL at 15:48

## 2021-11-18 RX ADMIN — NYSTATIN 500000 UNITS: 100000 SUSPENSION ORAL at 11:25

## 2021-11-18 RX ADMIN — ENOXAPARIN SODIUM 40 MG: 40 INJECTION SUBCUTANEOUS at 09:54

## 2021-11-18 RX ADMIN — DICLOFENAC SODIUM TOPICAL GEL, 1%, 2 G: 10 GEL TOPICAL at 09:53

## 2021-11-18 RX ADMIN — IPRATROPIUM BROMIDE AND ALBUTEROL SULFATE 3 ML: 2.5; .5 SOLUTION RESPIRATORY (INHALATION) at 19:39

## 2021-11-18 RX ADMIN — FORMOTEROL FUMARATE DIHYDRATE 20 MCG: 20 SOLUTION RESPIRATORY (INHALATION) at 07:11

## 2021-11-18 RX ADMIN — TAMSULOSIN HYDROCHLORIDE 0.4 MG: 0.4 CAPSULE ORAL at 09:53

## 2021-11-18 RX ADMIN — BUSPIRONE HYDROCHLORIDE 10 MG: 10 TABLET ORAL at 15:42

## 2021-11-18 RX ADMIN — AMLODIPINE BESYLATE 10 MG: 10 TABLET ORAL at 09:53

## 2021-11-18 RX ADMIN — TRIAMTERENE AND HYDROCHLOROTHIAZIDE 1 TABLET: 37.5; 25 TABLET ORAL at 09:53

## 2021-11-18 RX ADMIN — FLUTICASONE PROPIONATE 2 SPRAY: 50 SPRAY, METERED NASAL at 09:53

## 2021-11-18 RX ADMIN — IPRATROPIUM BROMIDE AND ALBUTEROL SULFATE 3 ML: 2.5; .5 SOLUTION RESPIRATORY (INHALATION) at 11:24

## 2021-11-19 ENCOUNTER — APPOINTMENT (OUTPATIENT)
Dept: PHYSICAL THERAPY | Facility: CLINIC | Age: 76
End: 2021-11-19
Payer: MEDICARE

## 2021-11-19 ENCOUNTER — DOCUMENTATION (OUTPATIENT)
Dept: PULMONOLOGY | Facility: CLINIC | Age: 76
End: 2021-11-19

## 2021-11-19 ENCOUNTER — NURSING HOME VISIT (OUTPATIENT)
Dept: GERIATRICS | Facility: OTHER | Age: 76
End: 2021-11-19
Payer: MEDICARE

## 2021-11-19 DIAGNOSIS — R26.2 AMBULATORY DYSFUNCTION: ICD-10-CM

## 2021-11-19 DIAGNOSIS — Z87.891 FORMER SMOKER: ICD-10-CM

## 2021-11-19 DIAGNOSIS — E88.01 AAT (ALPHA-1-ANTITRYPSIN) DEFICIENCY (HCC): Chronic | ICD-10-CM

## 2021-11-19 DIAGNOSIS — G20 PARKINSON'S DISEASE (HCC): ICD-10-CM

## 2021-11-19 DIAGNOSIS — F41.9 ANXIOUSNESS: ICD-10-CM

## 2021-11-19 DIAGNOSIS — J43.8 OTHER EMPHYSEMA (HCC): Primary | ICD-10-CM

## 2021-11-19 PROCEDURE — 99305 1ST NF CARE MODERATE MDM 35: CPT | Performed by: INTERNAL MEDICINE

## 2021-11-22 ENCOUNTER — NURSING HOME VISIT (OUTPATIENT)
Dept: GERIATRICS | Facility: OTHER | Age: 76
End: 2021-11-22
Payer: MEDICARE

## 2021-11-22 ENCOUNTER — TELEPHONE (OUTPATIENT)
Dept: PALLIATIVE MEDICINE | Facility: CLINIC | Age: 76
End: 2021-11-22

## 2021-11-22 ENCOUNTER — APPOINTMENT (OUTPATIENT)
Dept: PHYSICAL THERAPY | Facility: CLINIC | Age: 76
End: 2021-11-22
Payer: MEDICARE

## 2021-11-22 DIAGNOSIS — J96.11 CHRONIC RESPIRATORY FAILURE WITH HYPOXIA (HCC): Chronic | ICD-10-CM

## 2021-11-22 DIAGNOSIS — F41.9 ANXIOUSNESS: ICD-10-CM

## 2021-11-22 DIAGNOSIS — G20 PARKINSON'S DISEASE (HCC): ICD-10-CM

## 2021-11-22 DIAGNOSIS — J43.2 CENTRILOBULAR EMPHYSEMA (HCC): Primary | ICD-10-CM

## 2021-11-22 DIAGNOSIS — R26.2 AMBULATORY DYSFUNCTION: ICD-10-CM

## 2021-11-22 DIAGNOSIS — I10 ESSENTIAL HYPERTENSION: ICD-10-CM

## 2021-11-22 DIAGNOSIS — E88.01 AAT (ALPHA-1-ANTITRYPSIN) DEFICIENCY (HCC): Chronic | ICD-10-CM

## 2021-11-22 PROCEDURE — 99309 SBSQ NF CARE MODERATE MDM 30: CPT | Performed by: NURSE PRACTITIONER

## 2021-11-24 ENCOUNTER — APPOINTMENT (OUTPATIENT)
Dept: PHYSICAL THERAPY | Facility: CLINIC | Age: 76
End: 2021-11-24
Payer: MEDICARE

## 2021-11-29 ENCOUNTER — TELEPHONE (OUTPATIENT)
Dept: NEUROLOGY | Facility: CLINIC | Age: 76
End: 2021-11-29

## 2021-11-30 ENCOUNTER — APPOINTMENT (OUTPATIENT)
Dept: PHYSICAL THERAPY | Facility: CLINIC | Age: 76
End: 2021-11-30
Payer: MEDICARE

## 2021-12-01 ENCOUNTER — NURSING HOME VISIT (OUTPATIENT)
Dept: GERIATRICS | Facility: OTHER | Age: 76
End: 2021-12-01
Payer: MEDICARE

## 2021-12-01 DIAGNOSIS — F41.9 ANXIOUSNESS: ICD-10-CM

## 2021-12-01 DIAGNOSIS — N13.8 BENIGN PROSTATIC HYPERPLASIA WITH URINARY OBSTRUCTION: Chronic | ICD-10-CM

## 2021-12-01 DIAGNOSIS — R45.89 DYSPHORIC MOOD: ICD-10-CM

## 2021-12-01 DIAGNOSIS — J96.11 CHRONIC RESPIRATORY FAILURE WITH HYPOXIA (HCC): Chronic | ICD-10-CM

## 2021-12-01 DIAGNOSIS — M19.90 ARTHRITIS: ICD-10-CM

## 2021-12-01 DIAGNOSIS — N40.1 BENIGN PROSTATIC HYPERPLASIA WITH URINARY OBSTRUCTION: Chronic | ICD-10-CM

## 2021-12-01 DIAGNOSIS — Z51.5 PALLIATIVE CARE PATIENT: ICD-10-CM

## 2021-12-01 DIAGNOSIS — G20 PARKINSON'S DISEASE (HCC): ICD-10-CM

## 2021-12-01 DIAGNOSIS — K21.9 GASTROESOPHAGEAL REFLUX DISEASE WITHOUT ESOPHAGITIS: ICD-10-CM

## 2021-12-01 DIAGNOSIS — K59.09 OTHER CONSTIPATION: ICD-10-CM

## 2021-12-01 DIAGNOSIS — I10 ESSENTIAL HYPERTENSION: ICD-10-CM

## 2021-12-01 DIAGNOSIS — R26.2 AMBULATORY DYSFUNCTION: ICD-10-CM

## 2021-12-01 DIAGNOSIS — F41.9 ANXIETY: ICD-10-CM

## 2021-12-01 DIAGNOSIS — I10 ORTHOSTATIC HYPERTENSION: ICD-10-CM

## 2021-12-01 DIAGNOSIS — G47.00 INSOMNIA, UNSPECIFIED TYPE: ICD-10-CM

## 2021-12-01 DIAGNOSIS — G62.9 NEUROPATHY: ICD-10-CM

## 2021-12-01 DIAGNOSIS — J44.1 COPD EXACERBATION (HCC): ICD-10-CM

## 2021-12-01 DIAGNOSIS — J43.2 CENTRILOBULAR EMPHYSEMA (HCC): Primary | ICD-10-CM

## 2021-12-01 DIAGNOSIS — E88.01 AAT (ALPHA-1-ANTITRYPSIN) DEFICIENCY (HCC): Chronic | ICD-10-CM

## 2021-12-01 PROCEDURE — 99316 NF DSCHRG MGMT 30 MIN+: CPT | Performed by: NURSE PRACTITIONER

## 2021-12-02 RX ORDER — MIDODRINE HYDROCHLORIDE 2.5 MG/1
2.5 TABLET ORAL 3 TIMES DAILY
Qty: 42 TABLET | Refills: 0 | Status: SHIPPED | OUTPATIENT
Start: 2021-12-03 | End: 2022-01-31 | Stop reason: SDUPTHER

## 2021-12-02 RX ORDER — CHOLESTYRAMINE 4 G/9G
1 POWDER, FOR SUSPENSION ORAL DAILY
COMMUNITY
End: 2022-05-11 | Stop reason: SDUPTHER

## 2021-12-02 RX ORDER — ACETAMINOPHEN 500 MG
1000 TABLET ORAL EVERY 8 HOURS SCHEDULED
Qty: 42 TABLET | Refills: 0 | Status: SHIPPED | OUTPATIENT
Start: 2021-12-03 | End: 2021-12-10

## 2021-12-08 ENCOUNTER — OFFICE VISIT (OUTPATIENT)
Dept: PULMONOLOGY | Facility: MEDICAL CENTER | Age: 76
End: 2021-12-08
Payer: MEDICARE

## 2021-12-08 ENCOUNTER — TELEPHONE (OUTPATIENT)
Dept: PULMONOLOGY | Facility: CLINIC | Age: 76
End: 2021-12-08

## 2021-12-08 VITALS
HEIGHT: 77 IN | WEIGHT: 165 LBS | OXYGEN SATURATION: 99 % | SYSTOLIC BLOOD PRESSURE: 120 MMHG | DIASTOLIC BLOOD PRESSURE: 60 MMHG | RESPIRATION RATE: 12 BRPM | HEART RATE: 88 BPM | TEMPERATURE: 97.6 F | BODY MASS INDEX: 19.48 KG/M2

## 2021-12-08 DIAGNOSIS — J43.2 CENTRILOBULAR EMPHYSEMA (HCC): Chronic | ICD-10-CM

## 2021-12-08 DIAGNOSIS — E88.01 AAT (ALPHA-1-ANTITRYPSIN) DEFICIENCY (HCC): Primary | Chronic | ICD-10-CM

## 2021-12-08 DIAGNOSIS — J96.11 CHRONIC RESPIRATORY FAILURE WITH HYPOXIA (HCC): Chronic | ICD-10-CM

## 2021-12-08 PROCEDURE — 99214 OFFICE O/P EST MOD 30 MIN: CPT | Performed by: NURSE PRACTITIONER

## 2021-12-08 RX ORDER — TRAZODONE HYDROCHLORIDE 100 MG/1
TABLET ORAL
COMMUNITY
Start: 2021-12-03 | End: 2021-12-14 | Stop reason: ALTCHOICE

## 2021-12-10 ENCOUNTER — OFFICE VISIT (OUTPATIENT)
Dept: FAMILY MEDICINE CLINIC | Facility: CLINIC | Age: 76
End: 2021-12-10
Payer: MEDICARE

## 2021-12-10 DIAGNOSIS — S01.302A OPEN WOUND OF LEFT EAR, UNSPECIFIED OPEN WOUND TYPE, INITIAL ENCOUNTER: Primary | ICD-10-CM

## 2021-12-10 DIAGNOSIS — R53.81 PHYSICAL DECONDITIONING: ICD-10-CM

## 2021-12-10 PROCEDURE — 99213 OFFICE O/P EST LOW 20 MIN: CPT | Performed by: FAMILY MEDICINE

## 2021-12-12 DIAGNOSIS — G62.9 NEUROPATHY: ICD-10-CM

## 2021-12-13 RX ORDER — GABAPENTIN 300 MG/1
CAPSULE ORAL
Qty: 90 CAPSULE | Refills: 0 | Status: SHIPPED | OUTPATIENT
Start: 2021-12-13 | End: 2022-01-24

## 2021-12-14 ENCOUNTER — TELEPHONE (OUTPATIENT)
Dept: FAMILY MEDICINE CLINIC | Facility: CLINIC | Age: 76
End: 2021-12-14

## 2021-12-14 ENCOUNTER — OFFICE VISIT (OUTPATIENT)
Dept: PALLIATIVE MEDICINE | Facility: CLINIC | Age: 76
End: 2021-12-14
Payer: MEDICARE

## 2021-12-14 VITALS
BODY MASS INDEX: 20.33 KG/M2 | WEIGHT: 171.4 LBS | SYSTOLIC BLOOD PRESSURE: 149 MMHG | HEART RATE: 75 BPM | RESPIRATION RATE: 20 BRPM | TEMPERATURE: 97.2 F | DIASTOLIC BLOOD PRESSURE: 72 MMHG | OXYGEN SATURATION: 97 %

## 2021-12-14 DIAGNOSIS — G47.00 INSOMNIA, UNSPECIFIED TYPE: ICD-10-CM

## 2021-12-14 DIAGNOSIS — R63.0 LOSS OF APPETITE: ICD-10-CM

## 2021-12-14 DIAGNOSIS — I87.2 CHRONIC VENOUS INSUFFICIENCY: ICD-10-CM

## 2021-12-14 DIAGNOSIS — Z51.5 PALLIATIVE CARE PATIENT: ICD-10-CM

## 2021-12-14 DIAGNOSIS — F41.9 ANXIOUSNESS: ICD-10-CM

## 2021-12-14 DIAGNOSIS — I27.82 OTHER CHRONIC PULMONARY EMBOLISM WITHOUT ACUTE COR PULMONALE (HCC): ICD-10-CM

## 2021-12-14 DIAGNOSIS — R26.81 UNSTEADY GAIT: Chronic | ICD-10-CM

## 2021-12-14 DIAGNOSIS — E88.01 AAT (ALPHA-1-ANTITRYPSIN) DEFICIENCY (HCC): Chronic | ICD-10-CM

## 2021-12-14 DIAGNOSIS — G20 PARKINSON'S DISEASE (HCC): ICD-10-CM

## 2021-12-14 DIAGNOSIS — R41.3 MEMORY PROBLEM: ICD-10-CM

## 2021-12-14 DIAGNOSIS — J43.2 CENTRILOBULAR EMPHYSEMA (HCC): Primary | Chronic | ICD-10-CM

## 2021-12-14 DIAGNOSIS — I27.20 PULMONARY HYPERTENSION (HCC): ICD-10-CM

## 2021-12-14 DIAGNOSIS — R45.89 DYSPHORIC MOOD: ICD-10-CM

## 2021-12-14 DIAGNOSIS — K21.9 GASTROESOPHAGEAL REFLUX DISEASE, UNSPECIFIED WHETHER ESOPHAGITIS PRESENT: ICD-10-CM

## 2021-12-14 DIAGNOSIS — J96.11 CHRONIC RESPIRATORY FAILURE WITH HYPOXIA (HCC): Chronic | ICD-10-CM

## 2021-12-14 PROCEDURE — 99214 OFFICE O/P EST MOD 30 MIN: CPT | Performed by: INTERNAL MEDICINE

## 2021-12-14 RX ORDER — OLANZAPINE 5 MG/1
5 TABLET ORAL
Qty: 30 TABLET | Refills: 2 | Status: SHIPPED | OUTPATIENT
Start: 2021-12-14 | End: 2022-01-03 | Stop reason: ALTCHOICE

## 2021-12-15 ENCOUNTER — OFFICE VISIT (OUTPATIENT)
Dept: FAMILY MEDICINE CLINIC | Facility: CLINIC | Age: 76
End: 2021-12-15
Payer: MEDICARE

## 2021-12-15 VITALS
OXYGEN SATURATION: 98 % | BODY MASS INDEX: 20.33 KG/M2 | SYSTOLIC BLOOD PRESSURE: 134 MMHG | DIASTOLIC BLOOD PRESSURE: 72 MMHG | HEART RATE: 87 BPM | RESPIRATION RATE: 24 BRPM | HEIGHT: 77 IN

## 2021-12-15 DIAGNOSIS — S01.302D OPEN WOUND OF LEFT EAR, UNSPECIFIED OPEN WOUND TYPE, SUBSEQUENT ENCOUNTER: Primary | ICD-10-CM

## 2021-12-15 PROCEDURE — 99213 OFFICE O/P EST LOW 20 MIN: CPT | Performed by: STUDENT IN AN ORGANIZED HEALTH CARE EDUCATION/TRAINING PROGRAM

## 2021-12-16 ENCOUNTER — TELEPHONE (OUTPATIENT)
Dept: PALLIATIVE MEDICINE | Facility: CLINIC | Age: 76
End: 2021-12-16

## 2021-12-20 ENCOUNTER — PATIENT OUTREACH (OUTPATIENT)
Dept: CASE MANAGEMENT | Facility: HOSPITAL | Age: 76
End: 2021-12-20

## 2021-12-21 ENCOUNTER — PATIENT OUTREACH (OUTPATIENT)
Dept: CASE MANAGEMENT | Facility: HOSPITAL | Age: 76
End: 2021-12-21

## 2021-12-23 ENCOUNTER — TELEPHONE (OUTPATIENT)
Dept: PALLIATIVE MEDICINE | Facility: CLINIC | Age: 76
End: 2021-12-23

## 2021-12-23 ENCOUNTER — TELEPHONE (OUTPATIENT)
Dept: FAMILY MEDICINE CLINIC | Facility: CLINIC | Age: 76
End: 2021-12-23

## 2021-12-23 RX ORDER — LORAZEPAM 1 MG/1
TABLET ORAL
COMMUNITY
Start: 2021-12-12 | End: 2022-05-11 | Stop reason: ALTCHOICE

## 2021-12-24 ENCOUNTER — NURSE TRIAGE (OUTPATIENT)
Dept: OTHER | Facility: OTHER | Age: 76
End: 2021-12-24

## 2021-12-29 ENCOUNTER — TELEPHONE (OUTPATIENT)
Dept: FAMILY MEDICINE CLINIC | Facility: CLINIC | Age: 76
End: 2021-12-29

## 2022-01-03 ENCOUNTER — OFFICE VISIT (OUTPATIENT)
Dept: FAMILY MEDICINE CLINIC | Facility: CLINIC | Age: 77
End: 2022-01-03
Payer: MEDICARE

## 2022-01-03 VITALS
HEART RATE: 82 BPM | SYSTOLIC BLOOD PRESSURE: 118 MMHG | HEIGHT: 77 IN | TEMPERATURE: 97 F | WEIGHT: 168.7 LBS | DIASTOLIC BLOOD PRESSURE: 60 MMHG | OXYGEN SATURATION: 98 % | BODY MASS INDEX: 19.92 KG/M2 | RESPIRATION RATE: 20 BRPM

## 2022-01-03 DIAGNOSIS — R63.4 WEIGHT LOSS, UNINTENTIONAL: ICD-10-CM

## 2022-01-03 DIAGNOSIS — Z87.891 PERSONAL HISTORY OF NICOTINE DEPENDENCE: ICD-10-CM

## 2022-01-03 DIAGNOSIS — F51.01 PRIMARY INSOMNIA: Primary | ICD-10-CM

## 2022-01-03 DIAGNOSIS — Z12.11 SCREENING FOR COLORECTAL CANCER: ICD-10-CM

## 2022-01-03 DIAGNOSIS — G20 PARKINSON'S DISEASE (HCC): ICD-10-CM

## 2022-01-03 DIAGNOSIS — Z12.2 ENCOUNTER FOR SCREENING FOR LUNG CANCER: ICD-10-CM

## 2022-01-03 DIAGNOSIS — Z12.12 SCREENING FOR COLORECTAL CANCER: ICD-10-CM

## 2022-01-03 PROCEDURE — 99214 OFFICE O/P EST MOD 30 MIN: CPT | Performed by: STUDENT IN AN ORGANIZED HEALTH CARE EDUCATION/TRAINING PROGRAM

## 2022-01-03 RX ORDER — MIRTAZAPINE 15 MG/1
15 TABLET, FILM COATED ORAL
Qty: 30 TABLET | Refills: 5 | Status: SHIPPED | OUTPATIENT
Start: 2022-01-03 | End: 2022-05-23 | Stop reason: SDUPTHER

## 2022-01-03 NOTE — PROGRESS NOTES
José Luis Cardozo 1945 male MRN: [id number]    Family Medicine Acute Visit    ASSESSMENT/PLAN      1  Primary insomnia  - Chronic, unresolved, sleep hygiene discussed  Olanzapine prescribed by prior MD discontinued, will initiate Remeron and RTO for 2 weeks  - mirtazapine (REMERON) 15 mg tablet; Take 1 tablet (15 mg total) by mouth daily at bedtime  Dispense: 30 tablet; Refill: 5    2  Personal history of nicotine dependence  3  Encounter for screening for lung cancer  - Smoking stopped  Pt counseled about need for LD CT, pt agreed, pt will continue home O2   - CT lung screening program; Future    4  Parkinson's disease ([facility] )  - Chronic, followed by Neuro, will report if initiating new meds by specialist    5  Weight loss, unintentional  - Chronic, red flag due to smoking history, pt will initiate remeron for insomnia and to promote weight gain  Pt agreed with plan of care  6  Screening for colorectal cancer  - PPx ordered  - Ambulatory referral to Gastroenterology; Future           Future Appointments   Date Time Provider Sridhar Urbina   1/5/2022  1:15 PM Ginger Ramirez PT [facility]   1/7/2022  9:30 AM [place]  [address]   1/21/2022  9:30 AM Vargas Kline MD [facility]   1/25/2022  9:20 AM Dominik Garrett DO [facility]   1/31/2022  9:00 AM Masha Pruitt MD [facility]   2/8/2022  9:20 AM Cristina Doyle MD [facility]   3/14/2022 10:00 AM Manas Heaton DO [facility]          SUBJECTIVE  CC: Insomnia ("always had trouble sleeping" but last 8-9 months have been particularly worse, trouble falling and staying asleep)      HPI:  José Luis Cardozo is a 67 y o  male who presents for the following    Insomnia- pt reported that he has had it for a coupole of years and was taking Trazadone by Dr Yasmany Rodriguez and was taken off meds for 2 weeks  Pt reported that he has been taking otc sleeping pills and it has not helped       parkinsons-Pt stated that he has a history of parkinsons and had an argument that he does not recall  Pt stated that he will start his physical therapy on Wed and will take it a couple of days a week  Pt is being followed by Dr Lit Lockwood and has not initated any medications at this time  Hx of tobacco use- greater than 60 years  Pt has smoked since 4years old and recently stopped 4 years ago  Pt reported that he does use home O2 at 3L on a continous basis  Pt is followed by Dr Karrie Cordoba the pulmunologist      Weight loss- Pt stated that he is losing a lot of weight, pt stated that he is not eating as much and not really as hungry and forces self to eat  Review of Systems   Constitutional: Negative for activity change, chills and fever  HENT: Negative for ear pain and sore throat  Eyes: Negative for pain and visual disturbance  Respiratory: Positive for shortness of breath  Negative for cough, chest tightness and wheezing  Cardiovascular: Negative for chest pain and palpitations  Gastrointestinal: Negative for abdominal pain, blood in stool and vomiting  Genitourinary: Positive for difficulty urinating  Negative for dysuria and hematuria  Musculoskeletal: Positive for gait problem  Negative for arthralgias and back pain  Skin: Negative for color change and rash  Neurological: Positive for weakness (left leg) and light-headedness (with movement)  Negative for dizziness, seizures, syncope and headaches  Psychiatric/Behavioral: Negative for agitation and behavioral problems  All other systems reviewed and are negative        Historical Information   The patient history was reviewed as follows:  Past Medical History:   Diagnosis Date    Anesthesia complication     Difficult to wake up    Arthritis     BPH (benign prostatic hyperplasia)     urinary frequency    Cancer (HCC)     basal cell neck, face    COPD (chronic obstructive pulmonary disease) (HCC)     [facility] 12/29/2019    Full dentures     Hiatal hernia     History of methicillin resistant staphylococcus aureus (MRSA)     10/11/2018 MRSA (nares) positive    Hypertension     Irritable bowel syndrome     Kidney stone     at least 7 episodes    Liver disease     Alpha 1- enzyme deficiency - diagnosed   has been on weekly replacement therapy since then    Pulmonary emphysema ([address] )     1/25/15  FEV1 - 2 45 liters or 59% of predicted    RSV infection 2017    Wears glasses     for driving only         Past Surgical History:   Procedure Laterality Date    BACK SURGERY      discectomy    COLONOSCOPY      COLONOSCOPY N/A 3/10/2017    Procedure: Sudhir Dennis;  Surgeon: Breann Sapp MD;  Location: [facility]; Service:    Don Henning      removal of kidney stones    ESOPHAGOGASTRODUODENOSCOPY N/A 3/10/2017    Procedure: ESOPHAGOGASTRODUODENOSCOPY (EGD); Surgeon: Breann Sapp MD;  Location: [facility]; Service:     LITHOTRIPSY      [place] ESOPHAGOGASTRODUODENOSCOPY TRANSORAL DIAGNOSTIC N/A 2018    Procedure: ESOPHAGOGASTRODUODENOSCOPY (EGD); Surgeon: Breann Sapp MD;  Location: [facility];   Service: Gastroenterology    TONSILLECTOMY      VEIN LIGATION AND STRIPPING Bilateral     17's     Family History   Problem Relation Age of Onset    Emphysema Mother         never smoked    Emphysema Father     Cancer Brother         colon    Colon cancer Brother     Ulcerative colitis Family     Liver disease Family       Social History   Social History     Substance and Sexual Activity   Alcohol Use Not Currently    Comment: stopped in      Social History     Substance and Sexual Activity   Drug Use Not Currently     Social History     Tobacco Use   Smoking Status Former Smoker    Packs/day: 1 00    Years: 60 00    Pack years: 60 00    Quit date: 2017    Years since quittin 3   Smokeless Tobacco Never Used   Tobacco Comment    quit in 2017       Medications:     Current Outpatient Medications:     amLODIPine (NORVASC) 10 mg tablet, TAKE ONE TABLET BY MOUTH DAILY , Disp: 30 tablet, Rfl: 6    budesonide (PULMICORT) 0 5 mg/2 mL nebulizer solution, Take 2 mL (0 5 mg total) by nebulization 2 (two) times a day, Disp: 360 mL, Rfl: 3    busPIRone (BUSPAR) 10 mg tablet, TAKE ONE TABLET BY MOUTH THREE TIMES DAILY , Disp: 90 tablet, Rfl: 3    carbidopa-levodopa (Sinemet)  mg per tablet, Start with 1/2 tablet 30 min prior to breakfast, lunch and dinner for 1 week and then take 1 full tab prior to each meal , Disp: 90 tablet, Rfl: 3    cholestyramine (QUESTRAN) 4 g packet, Take 1 packet by mouth daily, Disp: , Rfl:     doxylamine (Unisom SleepTabs) 25 MG tablet, Take 25 mg by mouth daily at bedtime as needed for sleep, Disp: , Rfl:     finasteride (PROSCAR) 5 mg tablet, TAKE ONE TABLET BY MOUTH IN THE MORNING , Disp: 90 tablet, Rfl: 3    fluticasone (FLONASE) 50 mcg/act nasal spray, 2 sprays into each nostril daily, Disp: 16 g, Rfl: 5    formoterol (Perforomist) 20 MCG/2ML nebulizer solution, Take 2 mL (20 mcg total) by nebulization 2 (two) times a day, Disp: 360 mL, Rfl: 3    gabapentin (NEURONTIN) 300 mg capsule, TAKE ONE CAPSULE BY MOUTH THREE TIMES DAILY, Disp: 90 capsule, Rfl: 0    ipratropium-albuterol (DUO-NEB) 0 5-2 5 mg/3 mL nebulizer solution, Take 3 mL by nebulization 4 (four) times a day, Disp: 360 mL, Rfl: 5    LORazepam (ATIVAN) 1 mg tablet, , Disp: , Rfl:     OXYGEN-HELIUM IN, Inhale 2L at rest- 3L with activity, Disp: , Rfl:     pantoprazole (PROTONIX) 40 mg tablet, Take 1 tablet (40 mg total) by mouth 2 (two) times a day, Disp: 180 tablet, Rfl: 3    tamsulosin (FLOMAX) 0 4 mg, TAKE ONE CAPSULE BY MOUTH ONE TIME DAILY , Disp: 90 capsule, Rfl: 0    Ventolin  (90 Base) MCG/ACT inhaler, Inhale 2 puffs every 4 (four) hours as needed for wheezing, Disp: 18 g, Rfl: 5    ZEMAIRA infusion, once a week , Disp: , Rfl:     Diclofenac Sodium (VOLTAREN) 1 %, Apply 2 g topically 4 (four) times a day for 7 days Apply to R knee, Disp: 56 g, Rfl: 0    midodrine (PROAMATINE) 2 5 mg tablet, Take 1 tablet (2 5 mg total) by mouth 3 (three) times a day for 14 days Please hold medication if blood pressure is above 141 systolic , Disp: 42 tablet, Rfl: 0    mirtazapine (REMERON) 15 mg tablet, Take 1 tablet (15 mg total) by mouth daily at bedtime, Disp: 30 tablet, Rfl: 5    Allergies   Allergen Reactions    Chantix [Varenicline]      Caused prostate infection       OBJECTIVE  Vitals:   Vitals:    01/03/22 0835   BP: 118/60   Pulse: 82   Resp: 20   Temp: (!) 97 °F (36 1 °C)   TempSrc: Tympanic   SpO2: 98%   Weight: 76 5 kg (168 lb 11 2 oz)   Height: 6' 5" (1 956 m)         Physical Exam  Vitals reviewed  Constitutional:       General: He is not in acute distress  Appearance: Normal appearance  He is not toxic-appearing  HENT:      Head: Atraumatic  Nose: No congestion  Eyes:      General: No scleral icterus  Pupils: Pupils are equal, round, and reactive to light  Cardiovascular:      Rate and Rhythm: Normal rate  Heart sounds: No murmur heard  No gallop  Pulmonary:      Effort: Pulmonary effort is normal  No respiratory distress or retractions  Breath sounds: Decreased air movement present  No wheezing, rhonchi or rales  Comments: 3 L NC  Abdominal:      Palpations: Abdomen is soft  Tenderness: There is no abdominal tenderness  There is no guarding  Musculoskeletal:         General: No swelling or deformity  Cervical back: Normal range of motion  No tenderness  Right lower leg: No edema  Left lower leg: No edema  Right foot: Normal range of motion  Left foot: Normal range of motion  Feet:      Right foot:      Skin integrity: No ulcer, skin breakdown or callus  Toenail Condition: Right toenails are normal       Left foot:      Skin integrity: No ulcer, skin breakdown or callus        Toenail Condition: Left toenails are normal    Skin:     General: Skin is warm  Capillary Refill: Capillary refill takes more than 3 seconds  Findings: Rash (facail seborrric dermatitis) present  No lesion  Neurological:      Mental Status: He is alert and oriented to person, place, and time  Cranial Nerves: No cranial nerve deficit  Motor: Weakness (LLE) present  Coordination: Coordination normal       Gait: Gait abnormal    Psychiatric:         Mood and Affect: Mood normal          Thought Content:  Thought content normal                     Susan Dias MD  [facility]   1/3/2022

## 2022-01-05 ENCOUNTER — EVALUATION (OUTPATIENT)
Dept: PHYSICAL THERAPY | Facility: CLINIC | Age: 77
End: 2022-01-05
Payer: MEDICARE

## 2022-01-05 ENCOUNTER — TELEPHONE (OUTPATIENT)
Dept: PALLIATIVE MEDICINE | Facility: CLINIC | Age: 77
End: 2022-01-05

## 2022-01-05 DIAGNOSIS — G20 PARKINSON'S DISEASE (HCC): Primary | ICD-10-CM

## 2022-01-05 DIAGNOSIS — J43.2 CENTRILOBULAR EMPHYSEMA (HCC): ICD-10-CM

## 2022-01-05 PROCEDURE — 97163 PT EVAL HIGH COMPLEX 45 MIN: CPT | Performed by: PHYSICAL THERAPIST

## 2022-01-05 NOTE — PROGRESS NOTES
PT Evaluation           Insurance:  AMA/CMS Eval/ Re-eval POC expires Doc Flatten #/ Referral # Total units  Start date  Expiration date Extension  Visit limitation? PT only or  PT+OT? Co-Insurance                                                                                Date               Units:  Used               Authed:  Remaining                     Date               Units:  Used               Authed:  Remaining                           Today's date: 2022  Patient name: Crissy Moore  : 1945  MRN: [id number]  Referring provider: Karen Comer MD  Dx:   Encounter Diagnosis     ICD-10-CM    1  Parkinson's disease ([facility] [address] )  [address] Ambulatory referral to Physical Therapy             Assessment  Assessment details: Patient is a 67 y o  Male who presents to skilled outpatient PT with a diagnosis of PD occurring 3 months ago  Pt demonstrates gait deficits as follows: slow gait speed, shuffling gait, decreased reciprocal arm swing, absent trunk rotation, mild-moderate postural instability, and forward head and shoulder posture  His score on TUG indicate that he is at HIGH risk for falls  TUG cognitive time has >10% increase compared to TUG, indicating increased fall risk with dual task activities  This finding in addition to pt reported memory deficits indicates that pt would be a good candidate for OT cognitive evaluation  5xSTS scored below age norms and required 2 UE assist and CS to complete, indicating impaired lower extremity strength and decreased safety with transfers  Reduced cardiovascular endurance, compounded by long standing COPD, indicated by need for seated rest during 150 foot walk from PT gym to lobby  Plan to complete 2MWT to quantify cardiovascular impairment during following sessions  He presents with the following symptoms that are consistent with Alan and Yahr stage 3: mild to moderate postural instability and independent   Per research provided by [employer], patient will benefit from skilled outpatient PT services to improve and maximize his function, to reduce risk for falls and potential injuries associated with falls, reduce healthcare costs via hospitalization, and reduce patient and national healthcare costs  In early stages of Parkinson's Disease, research indicates that intensive physical exercise can improve patient's motor control and assist in slowing the disease progression as a neuroprotective agent  Patient will benefit from skilled outpatient PT in order to maximize function in the short-term and long-term with overall improved postural strength with associated stability and mobility  Impairments: Abnormal coordination, Abnormal gait, Abnormal or restricted ROM, Activity intolerance, Impaired balance, Impaired physical strength, Lacks appropriate HEP, Poor posture, Poor body mechanics, Safety issue and Abnormal movement  Understanding of Dx/Px/POC: Fair  Prognosis: Good      Patient verbalized understanding of POC  Please contact me if you have any questions or recommendations  Thank you for the referral and the opportunity to share in [employer] care             Plan  Program: LSVT BIG  Patient would benefit from: Skilled PT, OT, Speech  Planned therapy interventions: Abdominal trunk stabilization, ADL training, Balance, Balance/WB training, Breathing training, Body mechanics training, Coordination, Functional ROM exercises, Gait training, HEP, Motor coordination training, Neuromuscular re-education, Patient education, Postural training, Strengthening, Stretching, Therapeutic activities, Therapeutic exercises and Transfer training  Frequency: 2-3x/wk   Duration in weeks: 12 weeks  Plan of Care beginning date: 1/5/22  Plan of Care expiration date: 12 weeks - 3/30/2022  Treatment plan discussed with: Patient         Goals  Short Term Goals:  - Patient will improve TUG score by MDC of 4 8 sec from 18 92 sec to 14 12 sec in order to reduce risk of falls and to increase safety with functional mobility  - Patient will improve 5 STS by the [address] of 2 3 sec from 16 87 sec to 14 57 sec indicating an improvement in strength and decreased challenge with transfers  - Patient will perform 2 MWT to promote improvement in cardiovascular endurance in order to maximize function with functional mobility throughout the community  - Patient will be independent in basic HEP in order to manage condition at home    Long Term Goals:  - Patient will score a low risk for falls 2/4 fall risks measures  - Patient will score age norm values for 1/2 endurance measures  - Patient will be able to ambulate on uneven surfaces with 50% reduction in near falls to increase safety with community mobility  - Patient will be able to perform a floor transfer without physical assistance to assist with fall recovery at home and in the community  - Patient will be independent in comprehensive HEP post discharge from program  - Patient will be able to walk up incline with decreased difficulty and no loss of balance in order to improve functional mobility throughout the community  - Patient will be able to perform sit to stands from softer surfaces such as a couch or bed in order to improvement function with transfers        Cut off score   All date taken from [facility] Neuro Section or Rehab Measures      MALDONADO Cutoff Scores:  MDC: 5 pts  Falls Risk Cutoff: 40 22/56 DGI Cutoff Scores:  Stann Opitz al 2011, MDC: 2 9 pts  Claudine hanley al 2008, Arlene: Score <19/24   MiniBEST Cutoff Scores:  Gavino Can al 2011, MDC: 5 52 pts  Mark and Merlinda Balboa 2013, [place]: <19/28 5xSTS Cutoff Scores:  MDC: 2 3 sec  Sofía Oliver et al, 2011, Arlene: > 16 sec   TUG Cutoff Scores:  Malik Claros al, 2011, MDC: 4 8 sec  Renaldo Perez et al, 2011, [place]: Meds ON: < 12 21 sec, OFF: 15 5 sec 10 Meter Walk Test Cutoff Scores:  Michael Ronald and Jorge, 2008, MDC: 0 18 m/s  Age Norm Values, Arlene: < 1 0 m/s   ABC Cutoff Scores:  Epifanio Boston, 2008, MDC: 13 pts  Pedrito All Hetal, MDC: 11 12 pts  Increased risk for falls: < 69% 6 Minute Walk Test Cutoff Scores:  Meghana Butts and Jorge, 2008, [address]: 269 ft  [facility] al, 2002, Norm Data Healthy Adults:  61 - 71 years:   M: 200 m      F: 1 m  79 - 78 years:   M: 1 m      F: 200 m  [de-identified] - 80 years:   M: 1 m      F: 80 m   PDQ-39 Cutoff Scores:  Nate monteiro, 2004:  MDC: Mobility (12 24), ADL (16 72), Emotional (14 22), Stigma (21 21), Social Support (21 25), Cognition (21 12), Communication (21 04), Bodily Discomfort (24 48)  Jorge et al, 2007:  Normative Data: Mobility (49 25), ADL (38 94), Emotional (37 92), Stigma (27 54), Social Support (14 78), Cognition (33 03), Communication (27 99), Bodily Discomfort (40 91) Alan and Yahr Stages  Stage 0: No S/S  Stage 1: Mild unilateral symptoms  Stage 1 5: Unilateral and axial symptoms  Stage 2: Bilateral symptoms, no balance impairment  Stage 2 5: Mild bilateral symptoms and recovery with pull test  Stage 3: Mild to moderate postural instability, independent  Stage 4: Severe disability, walking or standing unassisted  Stage 5: Bed bound, w/c bound           Subjective Evaluation    History of Present Illness  Mechanism of injury: PD dx 3 months ago, difficulty with balance  Primary AD: cane or walker  Previous Programs: Physical therapy for COPD, but not for PD      Social Support  Steps to enter house: 2 step front entrance, 1 through garage  Stairs in house: no  Lives in: house  Lives with: alone    Employment status: retired  Hand dominance: right handed    Treatments  Previous treatment: PT for COPD, but not yet for PD dx      Objective   Gait abnormalities: slow gait speed, shuffling gait, decrease reciprocal arm swing, absent trunk rotation, forward head and shoulder posture      Outcome Measures Initial Eval  1-5-22        5xSTS 16 87 sec, from 1 foam pad 2 UE asssit        TUG  - Regular  - Cognitive  - Carry   18 92 sec  21 00 sec  Defer        MALDONADO Defer/56        DGI Defer/24        FGA Defer/28        miniBEST Defer/28        10 meter Defer m/s        2MWT Defer ft        ABC Defer%        PDQ-39 Defer/100                      Precautions: Requires O2 via nasal canula, fall risk  Past Medical History:   Diagnosis Date    Anesthesia complication     Difficult to wake up    Arthritis     BPH (benign prostatic hyperplasia)     urinary frequency    Cancer (HCC)     basal cell neck, face    [facility] disease) ([facility]     COPD exacerbation ([facility] [address] ) 12/29/2019    Full dentures     Hiatal hernia     History of methicillin resistant staphylococcus aureus (MRSA)     10/11/2018 MRSA (nares) positive    Hypertension     Irritable bowel syndrome     Kidney stone     at least 7 episodes    Liver disease     Alpha 1- enzyme deficiency - diagnosed 2002   has been on weekly replacement therapy since then    Pulmonary emphysema ([facility] )     1/25/15  FEV1 - 2 45 liters or 59% of predicted    RSV infection 12/2017    Wears glasses     for driving only

## 2022-01-05 NOTE — TELEPHONE ENCOUNTER
Thank you  I did not refer the patient to home PT, however  Dr Corby Lee of Neurology made that referral in 11/2021

## 2022-01-05 NOTE — TELEPHONE ENCOUNTER
Misty Hatch from [facility] called to inform Dr Jas Ledezma of patient going to ambulatory therapist and [facility] only sees patients that don't leave the home there for patient will be discharge from the care

## 2022-01-06 DIAGNOSIS — N40.1 BENIGN PROSTATIC HYPERPLASIA WITH URINARY OBSTRUCTION: ICD-10-CM

## 2022-01-06 DIAGNOSIS — N13.8 BENIGN PROSTATIC HYPERPLASIA WITH URINARY OBSTRUCTION: ICD-10-CM

## 2022-01-06 RX ORDER — TAMSULOSIN HYDROCHLORIDE 0.4 MG/1
CAPSULE ORAL
Qty: 90 CAPSULE | Refills: 0 | Status: SHIPPED | OUTPATIENT
Start: 2022-01-06 | End: 2022-05-11 | Stop reason: SDUPTHER

## 2022-01-07 RX ORDER — FORMOTEROL FUMARATE 20 UG/2ML
20 SOLUTION RESPIRATORY (INHALATION) 2 TIMES DAILY
Qty: 360 ML | Refills: 11 | Status: SHIPPED | OUTPATIENT
Start: 2022-01-07 | End: 2022-04-07

## 2022-01-07 RX ORDER — BUDESONIDE 0.5 MG/2ML
0.5 INHALANT ORAL 2 TIMES DAILY
Qty: 360 ML | Refills: 11 | Status: SHIPPED | OUTPATIENT
Start: 2022-01-07 | End: 2022-05-11 | Stop reason: SDUPTHER

## 2022-01-10 ENCOUNTER — HOSPITAL ENCOUNTER (OUTPATIENT)
Dept: RADIOLOGY | Facility: HOSPITAL | Age: 77
Discharge: HOME | End: 2022-01-10
Attending: STUDENT IN AN ORGANIZED HEALTH CARE EDUCATION/TRAINING PROGRAM
Payer: MEDICARE

## 2022-01-10 ENCOUNTER — PATIENT OUTREACH (OUTPATIENT)
Dept: FAMILY MEDICINE CLINIC | Facility: CLINIC | Age: 77
End: 2022-01-10

## 2022-01-10 DIAGNOSIS — Z12.2 ENCOUNTER FOR SCREENING FOR LUNG CANCER: ICD-10-CM

## 2022-01-10 DIAGNOSIS — Z87.891 PERSONAL HISTORY OF NICOTINE DEPENDENCE: ICD-10-CM

## 2022-01-10 PROCEDURE — 71271 CT THORAX LUNG CANCER SCR C-: CPT

## 2022-01-10 NOTE — PROGRESS NOTES
SW received inbasket with questions pertaining to preventing pt from driving  SW responded and EMY Holt provided link for PCP for form that would need to be completed to determine if there is any level of impairment

## 2022-01-11 ENCOUNTER — OFFICE VISIT (OUTPATIENT)
Dept: PHYSICAL THERAPY | Facility: CLINIC | Age: 77
End: 2022-01-11
Payer: MEDICARE

## 2022-01-11 DIAGNOSIS — G20 PARKINSON'S DISEASE (HCC): Primary | ICD-10-CM

## 2022-01-11 PROCEDURE — 97112 NEUROMUSCULAR REEDUCATION: CPT

## 2022-01-11 NOTE — PROGRESS NOTES
Daily Note     Insurance:  AMA/CMS Eval/ Re-eval POC expires Prema Guaman #/ Referral # Total units  Start date  Expiration date Extension  Visit limitation? PT only or  PT+OT? Co-Insurance   CMS                                                                            Today's date: 2022  Patient name: Navya Urias  : 1945  MRN: [id number]  Referring provider: Kacey Juan MD  Dx:   Encounter Diagnosis     ICD-10-CM    1  Parkinson's disease ([address] )  G20                   Subjective: Patient reports no recent complaints  Patient presents with portable O2  Objective: See treatment diary below    GAIT BELT @ START OF TREATMENT    Vitals  - HR: 80 BPM  - SPO2: 99% on 3 liters of O2    TA  - ABC- see below  - 10 MWT- see below  - 6 MWT- see below      NMR      Daily Exercises: All 10 reps   1  Floor to ceiling- 10 reps  2  Side to side- 10 reps bilaterally +manual blocking of knees +chair on side  3  Forward step  - 10 reps bilaterally w/ chair  4  Side step - 10 reps bilaterally w/ chair  5  Backward step -  10 reps bilaterally  w/ chair- NOT TODAY  6  Rocking- 10 reps bilaterally- NOT TODAY  7  Twist firm ground- 10 reps bilaterally - NOT TODAY  8  Sit to stand- 10 reps- NOT TODAY    Assessment: Tolerated treatment well  Patient illustrates that he is an overall LOW risk for falls according his his ABC  However, according to his 10 MWT he demonstrates that he is a HIGH risk for falls and a limited household ambulator  Patient also illustrates that he substantially below his age matched norms according to his distance of 150 ft on the 2 MWT  Thus suggesting poor overall endurance especially since he was unable to complete a full 6 MWT  Initiated start of LSVT BIG program with patient require modifications throughout  Patient would benefit from continued PT to enable him to return to PLOF  Plan: Continue per plan of care          Outcome Measures Initial Eval  22 Daily Note       5xSTS 16 87 sec, from 1 foam pad 2 UE asssit        TUG  - Regular  - Cognitive  - Carry   18 92 sec  21 00 sec  Defer        MALDONADO Defer/56        DGI Defer/24        FGA Defer/28        miniBEST Defer/28        10 meter Defer m/s 0 54 m/s       2MWT 150 ft        ABC Defer% 71 13%       PDQ-39 Defer/100

## 2022-01-13 ENCOUNTER — OFFICE VISIT (OUTPATIENT)
Dept: PHYSICAL THERAPY | Facility: CLINIC | Age: 77
End: 2022-01-13
Payer: MEDICARE

## 2022-01-13 DIAGNOSIS — G20 PARKINSON'S DISEASE (HCC): Primary | ICD-10-CM

## 2022-01-13 PROCEDURE — 97112 NEUROMUSCULAR REEDUCATION: CPT

## 2022-01-13 NOTE — PROGRESS NOTES
Daily Note     Insurance:  AMA/CMS Eval/ Re-eval POC expires Funmi Hancock #/ Referral # Total units  Start date  Expiration date Extension  Visit limitation? PT only or  PT+OT? Co-Insurance   CMS    Not required NA NA NA NA BOMN PT 0                                                                 Today's date: 2022  Patient name: Kathia Sweet  : 1945  MRN: [id number]  Referring provider: Haley Gonzalez MD  Dx:   Encounter Diagnosis     ICD-10-CM    1  Parkinson's disease ([facility] [address] )  G20                   Subjective: Patient reports no recent complaints  Patient presents with portable O2  Objective: See treatment diary below    GAIT BELT @ START OF TREATMENT    Vitals  - HR: 80 BPM  - SPO2: 99% on 3 liters of O2    NMR      Daily Exercises: All 10 reps   1  Floor to ceiling- 10 reps  3  Seated forward step  - 10 reps bilaterally w/ chair  4  Seated side step - 10 reps bilaterally w/ chair  5  Seated backward step -  10 reps bilaterally  w/ chair  6  Seated rocking- 10 reps bilaterally  7  Twist firm ground- 10 reps bilaterally - NOT TODAY  8  Sit to stand- 10 reps- 1 airex on chair     Ambulation to/from lobby with SPC, 150' x 2, CG/CS    Assessment: Patient tolerated session well today  Patient able to complete large amplitude movements with minimal exernal cueing needed from seated position, although was limited in UE ROM likely due to shoulder weakness  Patient continues to ambulate with increased hip and knee flexion although did not experience any LOB  Patient will continue to benefit from skilled PT to maximize amplitude of movement and decrease fall risk for maximal daily function  Plan: Continue per plan of care         Outcome Measures Initial Eval  22 Daily Note       5xSTS 16 87 sec, from 1 foam pad 2 UE asssit        TUG  - Regular  - Cognitive  - Carry   18 92 sec  21 00 sec  Defer        MALDONADO Defer/56        DGI Defer/24        FGA Defer/28        miniBEST Defer/28        10 meter Defer m/s 0 54 m/s       2MWT 150 ft        ABC Defer% 71 13%       PDQ-39 Defer/100

## 2022-01-18 ENCOUNTER — OFFICE VISIT (OUTPATIENT)
Dept: PHYSICAL THERAPY | Facility: CLINIC | Age: 77
End: 2022-01-18
Payer: MEDICARE

## 2022-01-18 ENCOUNTER — PATIENT OUTREACH (OUTPATIENT)
Dept: FAMILY MEDICINE CLINIC | Facility: CLINIC | Age: 77
End: 2022-01-18

## 2022-01-18 DIAGNOSIS — G20 PARKINSON'S DISEASE (HCC): Primary | ICD-10-CM

## 2022-01-18 PROCEDURE — 97112 NEUROMUSCULAR REEDUCATION: CPT | Performed by: PHYSICAL THERAPIST

## 2022-01-18 NOTE — PROGRESS NOTES
CM met with patient while at PT  Pt is now using [employer] transportation to get to PT appointments  CM will follow up with patient at next office visit

## 2022-01-18 NOTE — PROGRESS NOTES
Daily Note     Insurance:  AMA/CMS Eval/ Re-eval POC expires Ricky Luke #/ Referral # Total units  Start date  Expiration date Extension  Visit limitation? PT only or  PT+OT? Co-Insurance   CMS    Not required NA NA NA NA BOMN PT 0                                                                 Today's date: 2022  Patient name: Cash Sloan  : 1945  MRN: [id number]  Referring provider: Lissa Ugarte MD  Dx:   Encounter Diagnosis     ICD-10-CM    1  Parkinson's disease ([facility] [address] )  G20                   Subjective: Patient reports no recent complaints  Patient presents with portable O2  Objective: See treatment diary below    GAIT BELT @ START OF TREATMENT    Vitals  - HR: 83 BPM  - SPO2: 98% on 3 liters of O2    NMR      Daily Exercises: All 10 reps   1  Floor to ceiling- 10 reps  3  Seated forward step  - 10 reps bilaterally w/ chair  4  Seated side step - 10 reps bilaterally w/ chair  5  Seated backward step -  10 reps bilaterally  w/ chair  6  Seated rocking- 10 reps bilaterally  7  Twist firm ground- 10 reps bilaterally  8  Sit to stand- 10 reps- 2 airex on chair     Ambulation to/from lobby with SPC, 150' x 2, CG/CS    Assessment: Patient tolerated session well today wth continued focus on increasing mobility via large amplitude movements  Pt demonstrates good recall of exercises, though requires occasional verbal/tactile cueing (approx 25%) to increease amplitude of exercises  Limited trunk mobility noted during seated rotations, continue to emphasize trunk mobility in future sessions  Patient will continue to benefit from skilled PT to maximize amplitude of movement and decrease fall risk for maximal daily function  Plan: Continue per plan of care         Outcome Measures Initial Eval  22 Daily Note       5xSTS 16 87 sec, from 1 foam pad 2 UE asssit        TUG  - Regular  - Cognitive  - Carry   18 92 sec  21 00 sec  Defer        MALDONADO Defer        DGI Defer        FGA  miniBEST Defer/28        10 meter Defer m/s 0 54 m/s       2MWT 150 ft        ABC Defer% 71 13%       PDQ-39 Defer/100

## 2022-01-19 DIAGNOSIS — I10 ORTHOSTATIC HYPERTENSION: ICD-10-CM

## 2022-01-20 ENCOUNTER — OFFICE VISIT (OUTPATIENT)
Dept: PHYSICAL THERAPY | Facility: CLINIC | Age: 77
End: 2022-01-20
Payer: MEDICARE

## 2022-01-20 DIAGNOSIS — G20 PARKINSON'S DISEASE (HCC): Primary | ICD-10-CM

## 2022-01-20 PROCEDURE — 97112 NEUROMUSCULAR REEDUCATION: CPT

## 2022-01-20 NOTE — PROGRESS NOTES
Daily Note     Insurance:  AMA/CMS Eval/ Re-eval POC expires Tyrese Zamarripa #/ Referral # Total units  Start date  Expiration date Extension  Visit limitation? PT only or  PT+OT? Co-Insurance   CMS 1/05 3-30-22  Not required NA NA NA NA BOMN PT Yes, $0 copay                                                                 Today's date: 2022  Patient name: Aislinn Crowder  : 1945  MRN: [id number]  Referring provider: Davian Kelly MD  Dx:   Encounter Diagnosis     ICD-10-CM    1  Parkinson's disease ([facility] [address] )  G20                   Subjective: Patient reports no recent complaints  Patient presents with portable O2  Objective: See treatment diary below    GAIT BELT @ START OF TREATMENT    Vitals  - HR: 83 BPM  - SPO2: 97% on 3 liters of O2    NMR      Daily Exercises: All 10 reps   1  Floor to ceiling- 10 reps  2  Side to side - 10 reps B/L  3  Seated forward step  - 10 reps bilaterally w/ chair  4  Seated side step - 10 reps bilaterally w/ chair  5  Seated backward step -  10 reps bilaterally  w/ chair  6  Seated rocking- 10 reps bilaterally  7  Twist firm ground- 10 reps bilaterally  8  Sit to stand- 10 reps- 2 airex on chair     Ambulation to/from lobby with SPC, 150' x 2, CG/CS    Assessment: Patient able to tolerate treatment session well today with continued focus on improved mobility with large amplitude exercises  Performed seated due to increased lightheadedness in standing  Good carryover and recall with exercises requiring up to 25% verbal and tactile cues in order to appropriately perform activities  Patient will continue to benefit from skilled PT to maximize amplitude of movement and decrease fall risk for maximal daily function  Plan: Continue per plan of care         Outcome Measures Initial Eval  22 Daily Note       5xSTS 16 87 sec, from 1 foam pad 2 UE asssit        TUG  - Regular  - Cognitive  - Carry   18 92 sec  21 00 sec  Defer        MALDONADO Defer/56        DGI Defer/        FGA Defer/28        miniBEST Defer/28        10 meter Defer m/s 0 54 m/s       2MWT 150 ft        ABC Defer% 71 13%       PDQ-39 Defer/100

## 2022-01-21 ENCOUNTER — TELEPHONE (OUTPATIENT)
Dept: FAMILY MEDICINE CLINIC | Facility: CLINIC | Age: 77
End: 2022-01-21

## 2022-01-21 ENCOUNTER — OFFICE VISIT (OUTPATIENT)
Dept: FAMILY MEDICINE CLINIC | Facility: CLINIC | Age: 77
End: 2022-01-21
Payer: MEDICARE

## 2022-01-21 VITALS
SYSTOLIC BLOOD PRESSURE: 124 MMHG | HEIGHT: 77 IN | DIASTOLIC BLOOD PRESSURE: 68 MMHG | WEIGHT: 172 LBS | TEMPERATURE: 97.8 F | HEART RATE: 78 BPM | BODY MASS INDEX: 20.31 KG/M2 | OXYGEN SATURATION: 98 % | RESPIRATION RATE: 18 BRPM

## 2022-01-21 DIAGNOSIS — I10 ESSENTIAL HYPERTENSION: Primary | ICD-10-CM

## 2022-01-21 DIAGNOSIS — Z91.89 DRIVING SAFETY ISSUE: ICD-10-CM

## 2022-01-21 DIAGNOSIS — G20 PARKINSON'S DISEASE (HCC): ICD-10-CM

## 2022-01-21 PROCEDURE — 99214 OFFICE O/P EST MOD 30 MIN: CPT | Performed by: STUDENT IN AN ORGANIZED HEALTH CARE EDUCATION/TRAINING PROGRAM
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
## 2022-01-21 NOTE — TELEPHONE ENCOUNTER
Patient called and said he forgot to ask about his CT lung screening results while he was here  He would like you to give him a call back 
Patient is requesting a call back with CT Lung Screening Results, please see previous message   Patient can be reached at the number listed below;    [phone]
[phone]

## 2022-01-21 NOTE — PATIENT INSTRUCTIONS
Hypertension     Please take your Amlodipine if your BP is higher than 140/90, please do not take if lower due to potential for low blood pressure and becoming lightheaded or weak  WHAT YOU NEED TO KNOW:   Hypertension is high blood pressure  Your blood pressure is the force of your blood moving against the walls of your arteries  Hypertension causes your blood pressure to get so high that your heart has to work much harder than normal  This can damage your heart  The cause of hypertension may not be known  This is called essential or primary hypertension  Hypertension caused by another medical condition, such as kidney disease, is called secondary hypertension  DISCHARGE INSTRUCTIONS:   Call your local emergency number (911 in the [address] [facility]) or have someone call if:   · You have chest pain  · You have any of the following signs of a heart attack:      ? Squeezing, pressure, or pain in your chest    ? You may  also have any of the following:     § Discomfort or pain in your back, neck, jaw, stomach, or arm    § Shortness of breath    § Nausea or vomiting    § Lightheadedness or a sudden cold sweat    · You become confused or have trouble speaking  · You suddenly feel lightheaded or have trouble breathing  Return to the emergency department if:   · You have a severe headache or vision loss  · You have weakness in an arm or leg  Call your doctor or cardiologist if:   · You feel faint, dizzy, confused, or drowsy  · You have been taking your blood pressure medicine but your pressure is higher than your provider says it should be  · You have questions or concerns about your condition or care  Medicines: You may  need any of the following:  · Antihypertensives  may be used to help lower your blood pressure  Several kinds of medicines are available  Your healthcare provider will choose medicines based on the kind of hypertension you have  You may need more than one type of medicine   Take the medicine exactly as directed  · Diuretics  help decrease extra fluid that collects in your body  This will help lower your blood pressure  You may urinate more often while you take this medicine  · Cholesterol medicine  helps lower your cholesterol level  A low cholesterol level helps prevent heart disease and makes it easier to control your blood pressure  · Take your medicine as directed  Contact your healthcare provider if you think your medicine is not helping or if you have side effects  Tell him or her if you are allergic to any medicine  Keep a list of the medicines, vitamins, and herbs you take  Include the amounts, and when and why you take them  Bring the list or the pill bottles to follow-up visits  Carry your medicine list with you in case of an emergency  Follow up with your doctor or cardiologist as directed: You will need to return to have your blood pressure checked and to have other lab tests done  Write down your questions so you remember to ask them during your visits  Stages of hypertension:       · Normal blood pressure is 119/79 or lower   Your healthcare provider may only check your blood pressure each year if it stays at a normal level  · Elevated blood pressure is 120/79 to 129/79   This is sometimes called prehypertension  Your healthcare provider may suggest lifestyle changes to help lower your blood pressure to a normal level  He or she may then check it again in 3 to 6 months  · Stage 1 hypertension is 130/80  to 139/89   Your provider may recommend lifestyle changes, medication, and checks every 3 to 6 months until your blood pressure is controlled  · Stage 2 hypertension is 140/90 or higher   Your provider will recommend lifestyle changes and have you take 2 kinds of hypertension medicines  You will also need to have your blood pressure checked monthly until it is controlled  Manage hypertension:   · Check your blood pressure at home    Avoid smoking, caffeine, and exercise at least 30 minutes before checking your blood pressure  Sit and rest for 5 minutes before you take your blood pressure  Extend your arm and support it on a flat surface  Your arm should be at the same level as your heart  Follow the directions that came with your blood pressure monitor  Check your blood pressure 2 times, 1 minute apart, before you take your medicine in the morning  Also check your blood pressure before your evening meal  Keep a record of your readings and bring it to your follow-up visits  Ask your healthcare provider what your blood pressure should be  · Manage any other health conditions you have  Health conditions such as diabetes can increase your risk for hypertension  Follow your healthcare provider's instructions and take all your medicines as directed  · Ask about all medicines  Certain medicines can increase your blood pressure  Examples include oral birth control pills, decongestants, herbal supplements, and NSAIDs, such as ibuprofen  Your healthcare provider can tell you which medicines are safe for you to take  This includes prescription and over-the-counter medicines  Lifestyle changes you can make to manage hypertension:  Your healthcare provider may recommend you work with a team to manage hypertension  The team may include medical experts such as a dietitian, an exercise or physical therapist, and a behavior therapist  Your family members may be included in helping you create lifestyle changes  · Limit sodium (salt) as directed  Too much sodium can affect your fluid balance  Check labels to find low-sodium or no-salt-added foods  Some low-sodium foods use potassium salts for flavor  Too much potassium can also cause health problems  Your healthcare provider will tell you how much sodium and potassium are safe for you to have in a day  He or she may recommend that you limit sodium to 2,300 mg a day           · Follow the meal plan recommended by your healthcare provider  A dietitian or your provider can give you more information on low-sodium plans or the DASH (Dietary Approaches to Stop Hypertension) eating plan  The DASH plan is low in sodium, processed sugar, unhealthy fats, and total fat  It is high in potassium, calcium, and fiber  These can be found in vegetables, fruit, and whole-grain foods  · Be physically active throughout the day  Physical activity, such as exercise, can help control your blood pressure and your weight  Be physically active for at least 30 minutes per day, on most days of the week  Include aerobic activity, such as walking or riding a bicycle  Also include strength training at least 2 times each week  Your healthcare providers can help you create a physical activity plan  · Decrease stress  This may help lower your blood pressure  Learn ways to relax, such as deep breathing or listening to music  · Limit alcohol as directed  Alcohol can increase your blood pressure  A drink of alcohol is 12 ounces of beer, 5 ounces of wine, or 1½ ounces of liquor  · Do not smoke  Nicotine and other chemicals in cigarettes and cigars can increase your blood pressure and also cause lung damage  Ask your healthcare provider for information if you currently smoke and need help to quit  E-cigarettes or smokeless tobacco still contain nicotine  Talk to your healthcare provider before you use these products  © Copyright "[employer] 2021 Information is for End User's use only and may not be sold, redistributed or otherwise used for commercial purposes  All illustrations and images included in CareNotes® are the copyrighted property of [employer]  or [facility] Gwen Hernandez   The above information is an  only  It is not intended as medical advice for individual conditions or treatments   Talk to your doctor, nurse or pharmacist before following any medical regimen to see if it is safe and effective for you

## 2022-01-22 ENCOUNTER — TELEPHONE (OUTPATIENT)
Dept: OTHER | Facility: HOSPITAL | Age: 77
End: 2022-01-22

## 2022-01-22 DIAGNOSIS — G62.9 NEUROPATHY: ICD-10-CM

## 2022-01-22 RX ORDER — MIDODRINE HYDROCHLORIDE 2.5 MG/1
2.5 TABLET ORAL 3 TIMES DAILY
Qty: 270 TABLET | Refills: 0 | OUTPATIENT
Start: 2022-01-22

## 2022-01-22 NOTE — TELEPHONE ENCOUNTER
Called pt to inform results:  1  New 2 mm right upper lobe nodule  2   Moderate to severe centrilobular emphysematous changes        Lung-RADS2, benign appearance or behavior  Continue annual screening with LDCT in 12 months

## 2022-01-24 ENCOUNTER — PATIENT OUTREACH (OUTPATIENT)
Dept: FAMILY MEDICINE CLINIC | Facility: CLINIC | Age: 77
End: 2022-01-24

## 2022-01-24 ENCOUNTER — APPOINTMENT (OUTPATIENT)
Dept: PHYSICAL THERAPY | Facility: CLINIC | Age: 77
End: 2022-01-24
Payer: MEDICARE

## 2022-01-24 RX ORDER — GABAPENTIN 300 MG/1
CAPSULE ORAL
Qty: 90 CAPSULE | Refills: 0 | Status: SHIPPED | OUTPATIENT
Start: 2022-01-24 | End: 2022-02-28

## 2022-01-24 NOTE — PROGRESS NOTES
Received email on 1/21/22 from Delana Aase requesting information on link for driving forms for pt  Emailed and sent inbasket to Dr Nancy Lawton stating this information is in the note form Mandeep Allen on 12/21  Attached link in inbasket as well

## 2022-01-25 ENCOUNTER — OFFICE VISIT (OUTPATIENT)
Dept: PULMONOLOGY | Facility: MEDICAL CENTER | Age: 77
End: 2022-01-25
Payer: MEDICARE

## 2022-01-25 VITALS
OXYGEN SATURATION: 97 % | WEIGHT: 174.4 LBS | HEART RATE: 87 BPM | DIASTOLIC BLOOD PRESSURE: 68 MMHG | BODY MASS INDEX: 20.59 KG/M2 | HEIGHT: 77 IN | SYSTOLIC BLOOD PRESSURE: 120 MMHG

## 2022-01-25 DIAGNOSIS — G47.00 INSOMNIA, UNSPECIFIED TYPE: ICD-10-CM

## 2022-01-25 DIAGNOSIS — I27.20 PULMONARY HYPERTENSION (HCC): ICD-10-CM

## 2022-01-25 DIAGNOSIS — Z51.5 PALLIATIVE CARE PATIENT: ICD-10-CM

## 2022-01-25 DIAGNOSIS — J43.2 CENTRILOBULAR EMPHYSEMA (HCC): ICD-10-CM

## 2022-01-25 DIAGNOSIS — J96.11 CHRONIC RESPIRATORY FAILURE WITH HYPOXIA (HCC): Chronic | ICD-10-CM

## 2022-01-25 PROCEDURE — 99215 OFFICE O/P EST HI 40 MIN: CPT | Performed by: INTERNAL MEDICINE

## 2022-01-25 NOTE — LETTER
January 25, 2022     [address]  [address]    Patient: Francie Sun   YOB: 1945   Date of Visit: 1/25/2022       Dear Dr Fermín Hussein: Thank you for referring Jeff Overton to me for evaluation  Below are my notes for this consultation  If you have questions, please do not hesitate to call me  I look forward to following your patient along with you  Sincerely,        Wilbur Rich DO        CC: MD Wilbur Felipe DO  1/25/2022  8:28 PM  Sign when Signing Visit  Sleep Consultation   Francie Sun 67 y o  male MRN: [id number]      Reason for consultation: sleep disturbance    Requesting physician: Dr Chiara Horn    Assessment/Plan  67 y o  M with PMHx of Parkinson's, BPH, COPD with alpha 1 antitrypsin, IBS, Hiatal hernia who comes in for evaluation of sleep disturbance  1   Snoring/Excessive daytime sleepiness -  Mallampati class 4, Body mass index is 20 68 kg/m² , Neck Circumference: 18   He is at risk for obstructive sleep apnea based on [employer] survey  -  I did recommend to check a baseline polysomnogram to assess for obstructive sleep apnea  He is very hesitant to move forward with that at this time  He has little interest in dealing with testing and treatment such as CPAP at this time  Especially as he feels the Mirtazapine has been helpful for him  -  I discussed in depth the diagnostic studies and treatment options involved with obstructive sleep apnea      -  I also discussed in depth the risk of leaving sleep apnea untreated including hypertension, heart failure, arrhythmia, MI and stroke  2   Restless legs       -  Treat KRYSTAL as above       -  I recommended cheking CBC, Iron studies, B12/Folic acid, magnesium to ensure that is not a cause for restless legs  He is not interested at this time          -  He states that Mirtazapine which was recently added does not seem to have worsened RLS  3   Sleep onset insomnia - he has tried several medications  However, recently he was started on Mirtazapine and it seems to have helped his sleep significantly  He feels like he is sleeping more soundly through the night  4   Risk for RBD - he has parkinson's and is at risk for possible RBD  He does admit to very vivid dreams but to his knowledge does not fight or act out in his sleep  Though he admits there is no bed partners  If he does agree to a sleep study in the future, I would consider an extended montage/RBD sleep study  5   Chronic hypoxic respiratory failure due to emphysema and alpha 1 antitrypsin  He uses 3L  He states his respiratory status is stable  Continue to follow with Dr Radha Holly  History of Present Illness   HPI:  Toño Strickland is a 67 y o  male with PMHx as below who comes in for evaluation of difficulty with sleep  He has tried several amnestic agents and has not had a lot of success  He also felt that he suffered significantly with insomnia  He had both difficulty falling asleep and staying asleep  He does state that recently they tried mirtazapine and he is sleeping much better at this time  He does note RLS symptoms but actually feels they have improved on Mirtazapine  He also notes very vivid dreams but denies of any known parasomnias  He does not have a bed partner at this time     Patient noted previous difficulty falling asleep, difficulty staying asleep, snoring  He denies excessive daytime sleepiness with an Strong score of 0, awakenings with gasping, witnessed apneas, morning headaches, or awakenings with dry mouth  he note symptoms of restless legs  he denies symptoms of cataplexy, sleep paralysis, hypnopompic or hypnagogic hallucinations  Sleep History:  he goes to bed at approximately 11, will get to sleep in 30 min, will get out of bed at 7 am   he will get up 4-5 times at night for bathroom or unknown reason    He states that with mirtazapine, this has improved  It will then take a few minutes to fall back asleep  He does not nap during the day  ROS:   Review of Systems   Constitutional: Negative  HENT: Negative  Eyes: Negative  Respiratory: Negative  Cardiovascular: Negative  Gastrointestinal: Negative  Endocrine: Negative  Genitourinary: Negative  Musculoskeletal: Negative  Skin: Negative  Allergic/Immunologic: Negative  Neurological: Negative  Hematological: Negative  Psychiatric/Behavioral: Negative  Historical Information   Past Medical History:   Diagnosis Date    Anesthesia complication     Difficult to wake up    Arthritis     BPH (benign prostatic hyperplasia)     urinary frequency    Cancer (HCC)     basal cell neck, face    COPD (chronic obstructive pulmonary disease) (HCC)     COPD exacerbation ([facility] [address] ) 12/29/2019    Full dentures     Hiatal hernia     History of methicillin resistant staphylococcus aureus (MRSA)     10/11/2018 MRSA (nares) positive    Hypertension     Irritable bowel syndrome     Kidney stone     at least 7 episodes    Liver disease     Alpha 1- enzyme deficiency - diagnosed 2002  has been on weekly replacement therapy since then    Pulmonary emphysema ([facility] [address] )     1/25/15  FEV1 - 2 45 liters or 59% of predicted    RSV infection 12/2017    Wears glasses     for driving only     Past Surgical History:   Procedure Laterality Date    BACK SURGERY  2008    discectomy    COLONOSCOPY      COLONOSCOPY N/A 3/10/2017    Procedure: Marylen Cheek;  Surgeon: Sharifa Saenz MD;  Location: [facility]; Service:    Rubens Boy      removal of kidney stones    ESOPHAGOGASTRODUODENOSCOPY N/A 3/10/2017    Procedure: ESOPHAGOGASTRODUODENOSCOPY (EGD); Surgeon: Sharifa Saenz MD;  Location: [facility];   Service:     LITHOTRIPSY      PA ESOPHAGOGASTRODUODENOSCOPY TRANSORAL DIAGNOSTIC N/A 11/20/2018    Procedure: ESOPHAGOGASTRODUODENOSCOPY (EGD); Surgeon: Yakelin Matthew MD;  Location[facility]; Service: Gastroenterology    TONSILLECTOMY      VEIN LIGATION AND STRIPPING Bilateral     17's     Family History   Problem Relation Age of Onset    Emphysema Mother         never smoked    Emphysema Father     Cancer Brother         colon    Colon cancer Brother     Ulcerative colitis Family     Liver disease Family      Social History     Socioeconomic History    Marital status:      Spouse name: Not on file    Number of children: Not on file    Years of education: Not on file    Highest education level: Not on file   Occupational History    Not on file   Tobacco Use    Smoking status: Former Smoker     Packs/day: 1 00     Years: 60 00     Pack years: 60 00     Quit date: 2017     Years since quittin 4    Smokeless tobacco: Never Used    Tobacco comment: quit in 2017   Vaping Use    Vaping Use: Never used   Substance and Sexual Activity    Alcohol use: Not Currently     Comment: stopped in     Drug use: Not Currently    Sexual activity: Not on file   Other Topics Concern    Not on file   Social History Narrative    Patient is   He has three adult children; 1 daughter and 2 sons  His daughter Adelaida Francois lives closest [facility] [place] [facility] miles away") and is very involved w/ his care  Patient is not Mandaeism  He lives alone  Social Determinants of Health     Financial Resource Strain: Not on file   Food Insecurity: Not on file   Transportation Needs: No Transportation Needs    Lack of Transportation (Medical): No    Lack of Transportation (Non-Medical):  No   Physical Activity: Not on file   Stress: Not on file   Social Connections: Not on file   Intimate Partner Violence: Not on file   Housing Stability: Not on file       Occupational History: retired    Meds/Allergies   Allergies   Allergen Reactions    Chantix [Varenicline]      Caused prostate infection       Home medications:  Prior to Admission medications    Medication Sig Start Date End Date Taking?  Authorizing Provider   amLODIPine (NORVASC) 10 mg tablet TAKE ONE TABLET BY MOUTH DAILY  11/5/21  Yes Adwoa Cabrales MD   budesonide (PULMICORT) 0 5 mg/2 mL nebulizer solution Take 2 mL (0 5 mg total) by nebulization 2 (two) times a day 1/7/22 4/7/22 Yes Clarence Delatorre PA-C   busPIRone (BUSPAR) 10 mg tablet TAKE ONE TABLET BY MOUTH THREE TIMES DAILY  11/5/21  Yes Adwoa Cabrales MD   carbidopa-levodopa (Sinemet)  mg per tablet Start with 1/2 tablet 30 min prior to breakfast, lunch and dinner for 1 week and then take 1 full tab prior to each meal  11/9/21  Yes Elie Emanuel MD   cholestyramine (QUESTRAN) 4 g packet Take 1 packet by mouth daily   Yes Historical Provider, MD   doxylamine (Unisom SleepTabs) 25 MG tablet Take 25 mg by mouth daily at bedtime as needed for sleep   Yes Historical Provider, MD   finasteride (PROSCAR) 5 mg tablet TAKE ONE TABLET BY MOUTH IN THE MORNING  10/21/21  Yes Adwoa Cabrales MD   fluticasone [facility]) 50 mcg/act nasal spray 2 sprays into each nostril daily 7/6/20  Yes Kelsea Street MD   formoterol (Perforomist) 20 MCG/2ML nebulizer solution Take 2 mL (20 mcg total) by nebulization 2 (two) times a day 1/7/22 4/7/22 Yes Clarence Delatorre PA-C   gabapentin (NEURONTIN) 300 mg capsule TAKE ONE CAPSULE BY MOUTH THREE TIMES DAILY 1/24/22  Yes Yoel Boss MD   ipratropium-albuterol (DUO-NEB) 0 5-2 5 mg/3 mL nebulizer solution Take 3 mL by nebulization 4 (four) times a day 8/11/21  Yes TOPHER Lynn   LORazepam (ATIVAN) 1 mg tablet  12/12/21  Yes Historical Provider, MD   mirtazapine (REMERON) 15 mg tablet Take 1 tablet (15 mg total) by mouth daily at bedtime 1/3/22  Yes Yoel Boss MD   OXYGEN-HELIUM IN Inhale 2L at rest- 3L with activity   Yes Historical Provider, MD   pantoprazole (PROTONIX) 40 mg tablet Take 1 tablet (40 mg total) by mouth 2 (two) times a day 11/4/20  Yes Alex Asencio MD   tamsulosin (FLOMAX) 0 4 mg TAKE ONE CAPSULE BY MOUTH DAILY 1/6/22  Yes Penny Metcalf MD   Ventolin  (15 Base) MCG/ACT inhaler Inhale 2 puffs every 4 (four) hours as needed for wheezing 11/8/21  Yes Laurie Rowland PA-C   ZEMAIRA infusion once a week  12/26/19  Yes Historical Provider, MD   Diclofenac Sodium (VOLTAREN) 1 % Apply 2 g topically 4 (four) times a day for 7 days Apply to R knee 12/3/21 12/10/21  TOPHER Sagastume   midodrine (PROAMATINE) 2 5 mg tablet Take 1 tablet (2 5 mg total) by mouth 3 (three) times a day for 14 days Please hold medication if blood pressure is above 409 systolic  12/3/21 12/17/21  TOPHER Sagastume       Vitals:   Blood pressure 120/68, pulse 87, height 6' 5" (1 956 m), weight 79 1 kg (174 lb 6 4 oz), SpO2 97 % , RA, Body mass index is 20 68 kg/m²  Neck Circumference: 18    Physical Exam  General: Pleasant, Awake alert and oriented x 3, conversant without conversational dyspnea, NAD, normal affect  HEENT:  PERRL, Sclera noninjected, nonicteric OU, Nares patent,  no craniofacial abnormalities, Mucous membranes, moist, no oral lesions, normal dentition, Mallampati class 3  NECK: Trachea midline, no accessory muscle use, no stridor, no cervical or supraclavicular adenopathy, JVP not elevated  CARDIAC: Reg, single s1/S2, no m/r/g  PULM: CTA bilaterally no wheezing, rhonchi or rales  ABD: Normoactive bowel sounds, soft nontender, nondistended, no rebound, no rigidity, no guarding  EXT: No cyanosis, no clubbing, no edema, normal capillary refill  NEURO: no focal neurologic deficits, AAOx3, moving all extremities appropriately    Labs: I have personally reviewed pertinent lab results    Lab Results   Component Value Date    WBC 7 13 11/18/2021    HGB 12 5 11/18/2021    HCT 37 4 11/18/2021    MCV 95 11/18/2021     (L) 11/18/2021      Lab Results   Component Value Date    GLUCOSE 89 09/27/2016    CALCIUM 9 1 11/18/2021     09/27/2016    K 3 5 11/18/2021    CO2 26 11/18/2021     11/18/2021    BUN 14 11/18/2021    CREATININE 1 12 11/18/2021     No results found for: IRON, TIBC, FERRITIN  Lab Results   Component Value Date    VPHJEOWS35 376 07/17/2019     Lab Results   Component Value Date    FOLATE 13 1 07/17/2019     Truejerry Cinnamon 1/28/20  Results:  FEV1/FVC Ratio: 45 %  Forced Vital Capacity: 3 56 L    65 % predicted  FEV1: 1 59 L     40 % predicted     Interpretation:  · Moderate obstructive airflow defect   · There is significant airway response with the administration of bronchodilator per ATS standards  · Flow volume loop is consistent with obstruction        Sleep studies:  None                                       DO [address] Sleep Physician

## 2022-01-26 ENCOUNTER — OFFICE VISIT (OUTPATIENT)
Dept: PHYSICAL THERAPY | Facility: CLINIC | Age: 77
End: 2022-01-26
Payer: MEDICARE

## 2022-01-26 DIAGNOSIS — G20 PARKINSON'S DISEASE (HCC): Primary | ICD-10-CM

## 2022-01-26 PROCEDURE — 97112 NEUROMUSCULAR REEDUCATION: CPT

## 2022-01-26 NOTE — PROGRESS NOTES
Sleep Consultation   Jonathan Paredes 67 y o  male MRN: [id number]      Reason for consultation: sleep disturbance    Requesting physician: Dr Keenan Falcon    Assessment/Plan  67 y o  M with PMHx of Parkinson's, BPH, COPD with alpha 1 antitrypsin, IBS, Hiatal hernia who comes in for evaluation of sleep disturbance  1   Snoring/Excessive daytime sleepiness -  Mallampati class 4, Body mass index is 20 68 kg/m² , Neck Circumference: 18   He is at risk for obstructive sleep apnea based on STOP [employer] survey  -  I did recommend to check a baseline polysomnogram to assess for obstructive sleep apnea  He is very hesitant to move forward with that at this time  He has little interest in dealing with testing and treatment such as CPAP at this time  Especially as he feels the Mirtazapine has been helpful for him  -  I discussed in depth the diagnostic studies and treatment options involved with obstructive sleep apnea      -  I also discussed in depth the risk of leaving sleep apnea untreated including hypertension, heart failure, arrhythmia, MI and stroke  2   Restless legs       -  Treat KRYSTAL as above       -  I recommended cheking CBC, Iron studies, B12/Folic acid, magnesium to ensure that is not a cause for restless legs  He is not interested at this time  -  He states that Mirtazapine which was recently added does not seem to have worsened RLS  3   Sleep onset insomnia - he has tried several medications  However, recently he was started on Mirtazapine and it seems to have helped his sleep significantly  He feels like he is sleeping more soundly through the night  4   Risk for RBD - he has parkinson's and is at risk for possible RBD  He does admit to very vivid dreams but to his knowledge does not fight or act out in his sleep  Though he admits there is no bed partners  If he does agree to a sleep study in the future, I would consider an extended montage/RBD sleep study        5   Chronic hypoxic respiratory failure due to emphysema and alpha 1 antitrypsin  He uses 3L  He states his respiratory status is stable  Continue to follow with Dr Zena Barrera  History of Present Illness   HPI:  Francie Sun is a 67 y o  male with PMHx as below who comes in for evaluation of difficulty with sleep  He has tried several amnestic agents and has not had a lot of success  He also felt that he suffered significantly with insomnia  He had both difficulty falling asleep and staying asleep  He does state that recently they tried mirtazapine and he is sleeping much better at this time  He does note RLS symptoms but actually feels they have improved on Mirtazapine  He also notes very vivid dreams but denies of any known parasomnias  He does not have a bed partner at this time     Patient noted previous difficulty falling asleep, difficulty staying asleep, snoring  He denies excessive daytime sleepiness with an Panola score of 0, awakenings with gasping, witnessed apneas, morning headaches, or awakenings with dry mouth  he note symptoms of restless legs  he denies symptoms of cataplexy, sleep paralysis, hypnopompic or hypnagogic hallucinations  Sleep History:  he goes to bed at approximately 11, will get to sleep in 30 min, will get out of bed at 7 am   he will get up 4-5 times at night for bathroom or unknown reason  He states that with mirtazapine, this has improved  It will then take a few minutes to fall back asleep  He does not nap during the day  ROS:   Review of Systems   Constitutional: Negative  HENT: Negative  Eyes: Negative  Respiratory: Negative  Cardiovascular: Negative  Gastrointestinal: Negative  Endocrine: Negative  Genitourinary: Negative  Musculoskeletal: Negative  Skin: Negative  Allergic/Immunologic: Negative  Neurological: Negative  Hematological: Negative  Psychiatric/Behavioral: Negative          Historical Information   Past Medical History:   Diagnosis Date    Anesthesia complication     Difficult to wake up    Arthritis     BPH (benign prostatic hyperplasia)     urinary frequency    Cancer (HCC)     basal cell neck, face    COPD (chronic obstructive pulmonary disease) (HCC)     COPD exacerbation (HCC) 12/29/2019    Full dentures     Hiatal hernia     History of methicillin resistant staphylococcus aureus (MRSA)     10/11/2018 MRSA (nares) positive    Hypertension     Irritable bowel syndrome     Kidney stone     at least 7 episodes    Liver disease     Alpha 1- enzyme deficiency - diagnosed 2002  has been on weekly replacement therapy since then    Pulmonary emphysema ([facility] [address] )     1/25/15  FEV1 - 2 45 liters or 59% of predicted    RSV infection 12/2017    Wears glasses     for driving only     Past Surgical History:   Procedure Laterality Date    BACK SURGERY  2008    discectomy    COLONOSCOPY      COLONOSCOPY N/A 3/10/2017    Procedure: Pilar Demetrius;  Surgeon: Jose Silverman MD;  Location: [place] [address] GI LAB; Service:    Emilia Menendez      removal of kidney stones    ESOPHAGOGASTRODUODENOSCOPY N/A 3/10/2017    Procedure: ESOPHAGOGASTRODUODENOSCOPY (EGD); Surgeon: Jose Silverman MD;  Location: [facility] GI LAB; Service:     LITHOTRIPSY      CA ESOPHAGOGASTRODUODENOSCOPY TRANSORAL DIAGNOSTIC N/A 11/20/2018    Procedure: ESOPHAGOGASTRODUODENOSCOPY (EGD); Surgeon: Jose Silverman MD;  Location: [facility] GI LAB; Service: Gastroenterology    TONSILLECTOMY      VEIN LIGATION AND STRIPPING Bilateral     17's     Family History   Problem Relation Age of Onset    Emphysema Mother         never smoked    Emphysema Father     Cancer Brother         colon    Colon cancer Brother     Ulcerative colitis Family     Liver disease Family      Social History     Socioeconomic History    Marital status:       Spouse name: Not on file    Number of children: Not on file    Years of education: Not on file    Highest education level: Not on file   Occupational History    Not on file   Tobacco Use    Smoking status: Former Smoker     Packs/day: 1 00     Years: 60 00     Pack years: 60 00     Quit date: 2017     Years since quittin 4    Smokeless tobacco: Never Used    Tobacco comment: quit in 2017   Vaping Use    Vaping Use: Never used   Substance and Sexual Activity    Alcohol use: Not Currently     Comment: stopped in     Drug use: Not Currently    Sexual activity: Not on file   Other Topics Concern    Not on file   Social History Narrative    Patient is   He has three adult children; 1 daughter and 2 sons  His daughter [place] lives closest [place] away") and is very involved w/ his care  Patient is not Yarsanism  He lives alone  Social Determinants of Health     Financial Resource Strain: Not on file   Food Insecurity: Not on file   Transportation Needs: No Transportation Needs    Lack of Transportation (Medical): No    Lack of Transportation (Non-Medical): No   Physical Activity: Not on file   Stress: Not on file   Social Connections: Not on file   Intimate Partner Violence: Not on file   Housing Stability: Not on file       Occupational History: retired    Meds/Allergies   Allergies   Allergen Reactions    Chantix [Varenicline]      Caused prostate infection       Home medications:  Prior to Admission medications    Medication Sig Start Date End Date Taking?  Authorizing Provider   amLODIPine (NORVASC) 10 mg tablet TAKE ONE TABLET BY MOUTH DAILY  21  Yes Abiola Zamudio MD   budesonide (PULMICORT) 0 5 mg/2 mL nebulizer solution Take 2 mL (0 5 mg total) by nebulization 2 (two) times a day 22 Yes Kati Kulkarni PA-C   busPIRone (BUSPAR) 10 mg tablet TAKE ONE TABLET BY MOUTH THREE TIMES DAILY  21  Yes Abiola Zamudio MD   carbidopa-levodopa (Sinemet)  mg per tablet Start with 1/2 tablet 30 min prior to breakfast, lunch and dinner for 1 week and then take 1 full tab prior to each meal  11/9/21  Yes Aayush Sheets MD   cholestyramine (QUESTRAN) 4 g packet Take 1 packet by mouth daily   Yes Historical Provider, MD   doxylamine (Unisom SleepTabs) 25 MG tablet Take 25 mg by mouth daily at bedtime as needed for sleep   Yes Historical Provider, MD   finasteride (PROSCAR) 5 mg tablet TAKE ONE TABLET BY MOUTH IN THE MORNING  10/21/21  Yes Ledy Batista MD   fluticasone Homero Cordell) 50 mcg/act nasal spray 2 sprays into each nostril daily 7/6/20  Yes Georgetta Mortimer, MD   formoterol (Perforomist) 20 MCG/2ML nebulizer solution Take 2 mL (20 mcg total) by nebulization 2 (two) times a day 1/7/22 4/7/22 Yes Jennifer Rangel PA-C   gabapentin (NEURONTIN) 300 mg capsule TAKE ONE CAPSULE BY MOUTH THREE TIMES DAILY 1/24/22  Yes Britt Wilson MD   ipratropium-albuterol (DUO-NEB) 0 5-2 5 mg/3 mL nebulizer solution Take 3 mL by nebulization 4 (four) times a day 8/11/21  Yes TOPHER Moraes   LORazepam (ATIVAN) 1 mg tablet  12/12/21  Yes Historical Provider, MD   mirtazapine (REMERON) 15 mg tablet Take 1 tablet (15 mg total) by mouth daily at bedtime 1/3/22  Yes Britt Wilson MD   OXYGEN-HELIUM IN Inhale 2L at rest- 3L with activity   Yes Historical Provider, MD   pantoprazole (PROTONIX) 40 mg tablet Take 1 tablet (40 mg total) by mouth 2 (two) times a day 11/4/20  Yes Ledy Batista MD   tamsulosin (FLOMAX) 0 4 mg TAKE ONE CAPSULE BY MOUTH DAILY 1/6/22  Yes Britt Wilson MD   Ventolin  (90 Base) MCG/ACT inhaler Inhale 2 puffs every 4 (four) hours as needed for wheezing 11/8/21  Yes Jennifer Rangel PA-C   ZEMAIRA infusion once a week  12/26/19  Yes Historical Provider, MD   Diclofenac Sodium (VOLTAREN) 1 % Apply 2 g topically 4 (four) times a day for 7 days Apply to R knee 12/3/21 12/10/21  TOPHER Vazquez   midodrine (PROAMATINE) 2 5 mg tablet Take 1 tablet (2 5 mg total) by mouth 3 (three) times a day for 14 days Please hold medication if blood pressure is above 697 systolic  12/3/21 12/17/21  Ya Promise, CRNP       Vitals:   Blood pressure 120/68, pulse 87, height 6' 5" (1 956 m), weight 79 1 kg (174 lb 6 4 oz), SpO2 97 % , RA, Body mass index is 20 68 kg/m²  Neck Circumference: 18    Physical Exam  General: Pleasant, Awake alert and oriented x 3, conversant without conversational dyspnea, NAD, normal affect  HEENT:  PERRL, Sclera noninjected, nonicteric OU, Nares patent,  no craniofacial abnormalities, Mucous membranes, moist, no oral lesions, normal dentition, Mallampati class 3  NECK: Trachea midline, no accessory muscle use, no stridor, no cervical or supraclavicular adenopathy, JVP not elevated  CARDIAC: Reg, single s1/S2, no m/r/g  PULM: CTA bilaterally no wheezing, rhonchi or rales  ABD: Normoactive bowel sounds, soft nontender, nondistended, no rebound, no rigidity, no guarding  EXT: No cyanosis, no clubbing, no edema, normal capillary refill  NEURO: no focal neurologic deficits, AAOx3, moving all extremities appropriately    Labs: I have personally reviewed pertinent lab results    Lab Results   Component Value Date    WBC 7 13 11/18/2021    HGB 12 5 11/18/2021    HCT 37 4 11/18/2021    MCV 95 11/18/2021     (L) 11/18/2021      Lab Results   Component Value Date    GLUCOSE 89 09/27/2016    CALCIUM 9 1 11/18/2021     09/27/2016    K 3 5 11/18/2021    CO2 26 11/18/2021     11/18/2021    BUN 14 11/18/2021    CREATININE 1 12 11/18/2021     No results found for: IRON, TIBC, FERRITIN  Lab Results   Component Value Date    CUDRUBQW75 376 07/17/2019     Lab Results   Component Value Date    FOLATE 13 1 07/17/2019     Bere Mu 1/28/20  Results:  FEV1/FVC Ratio: 45 %  Forced Vital Capacity: 3 56 L    65 % predicted  FEV1: 1 59 L     40 % predicted     Interpretation:  · Moderate obstructive airflow defect   · There is significant airway response with the administration of bronchodilator per ATS standards  · Flow volume loop is consistent with obstruction        Sleep studies:  None                                       DO Negar [address] Sleep Physician

## 2022-01-26 NOTE — PROGRESS NOTES
Daily Note     Insurance:  AMA/CMS Eval/ Re-eval POC expires Prema Guaman #/ Referral # Total units  Start date  Expiration date Extension  Visit limitation? PT only or  PT+OT? Co-Insurance   CMS 1/05 3-30-22  Not required NA NA NA NA BOMN PT Yes, $0 copay                                                                 Today's date: 2022  Patient name: Navya Urias  : 1945  MRN: [id number]  Referring provider: Kacey Juan MD  Dx:   Encounter Diagnosis     ICD-10-CM    1  Parkinson's disease ([facility] [address] )  G20                   Subjective: Patient reports no recent complaints  Patient presents with portable O2  Patient c/o dizziness when walking back to gym  Objective: See treatment diary below    GAIT BELT @ START OF TREATMENT    Vitals  - HR: 82 BPM  - SPO2: 98% on 3 liters of O2    NMR     1  Floor to ceiling- 10 reps  2  Side to side - 10 reps B/L  3  Seated forward step  - 10 reps bilaterally w/ chair  4  Seated side step - 10 reps bilaterally w/ chair  5  Seated backward step -  10 reps bilaterally  w/ chair  6  Seated rocking- 10 reps bilaterally  7  Twist firm ground- 10 reps bilaterally  8  Sit to stand- 12 reps- 2 airex on chair     Ambulation to/from lobby with SPC, 150' x 2 (first time 76' x 2) CG/CS    Standing rotations with boom sara, 5x B/L    Assessment: Patient tolerated session well today with continued seated exercises due to lightheadedness reported by patient when ambulating and completing sit to stand transfer  Patient able to perform exercises with verbal, visual, and tactile cueing particularly in regards to UE amplitude  Discussed lightheadedness with patient and recommended following up with cardiologist if able (patient does not see one currently) or mentioning to neurologist at visit on this coming Monday  Patient challenged with dissociating UE from LE in trunk twists   Patient will benefit from continued skilled therapy to improve amplitude of movement, strength, and balance for maximal daily function  Plan: Continue per plan of care         Outcome Measures Initial Eval  1-5-22 Daily Note       5xSTS 16 87 sec, from 1 foam pad 2 UE asssit        TUG  - Regular  - Cognitive  - Carry   18 92 sec  21 00 sec  Defer        MALDONADO Defer/56        DGI Defer/24        FGA Defer/28        miniBEST Defer/28        10 meter Defer m/s 0 54 m/s       2MWT 150 ft        ABC Defer% 71 13%       PDQ-39 Defer/100

## 2022-01-27 ENCOUNTER — APPOINTMENT (OUTPATIENT)
Dept: PHYSICAL THERAPY | Facility: CLINIC | Age: 77
End: 2022-01-27
Payer: MEDICARE

## 2022-01-27 ENCOUNTER — TELEPHONE (OUTPATIENT)
Dept: NEUROLOGY | Facility: CLINIC | Age: 77
End: 2022-01-27

## 2022-01-31 ENCOUNTER — OFFICE VISIT (OUTPATIENT)
Dept: NEUROLOGY | Facility: CLINIC | Age: 77
End: 2022-01-31
Payer: MEDICARE

## 2022-01-31 VITALS
SYSTOLIC BLOOD PRESSURE: 128 MMHG | TEMPERATURE: 97.2 F | DIASTOLIC BLOOD PRESSURE: 73 MMHG | BODY MASS INDEX: 20.66 KG/M2 | WEIGHT: 175 LBS | HEART RATE: 80 BPM | HEIGHT: 77 IN

## 2022-01-31 DIAGNOSIS — I10 ORTHOSTATIC HYPERTENSION: ICD-10-CM

## 2022-01-31 DIAGNOSIS — G20 PARKINSON DISEASE (HCC): Primary | ICD-10-CM

## 2022-01-31 PROCEDURE — 99214 OFFICE O/P EST MOD 30 MIN: CPT | Performed by: PSYCHIATRY & NEUROLOGY

## 2022-01-31 RX ORDER — MIDODRINE HYDROCHLORIDE 5 MG/1
TABLET ORAL
Qty: 90 TABLET | Refills: 4 | Status: SHIPPED | OUTPATIENT
Start: 2022-01-31 | End: 2022-02-14 | Stop reason: SDUPTHER

## 2022-01-31 NOTE — PROGRESS NOTES
Return NeuroOutpatient Note        Enio Richards  [id number]  67 y o   1945       Parkinson's Disease        History obtained from:  Patient     HPI/Subjective:    Enio Richards is a 68 yo M with recently dx PD presents as f/u  Per my previous history, on 9/25/21, patient had presented to [place] with sxs of lightheadedness, sob, poor sleep  He was stroke alerted though suspicion for stroke was low  His CTA had revealed left ICA 63% stenosis  He was placed on aspirin 81mg  He thinks he's here also because he had gotten confused once  He was asked to take detour and had difficulty with turning around  At previous encounter, patient was noted to have soft voice  He has stooped posture  He has hard time walking without walker  He gets lightheaded every now and then  He does report difficulty swallowing  He feels as though food gets stuck  He does drool at night  Occasionally, there is right hand tremor though upon action  We had started him on sinemet and asked him to do PT  Had spoken to his daughter about what to expect       Patient says his main problem is lightheadedness when he stands up  This is his limitation  His daughter does his shopping  Patient is able to make quick meals by himself  Patient was placed on midodrine 2 5mg tid  He says he doesn't take norvasc unless his bp is elevated  Patient's EEG was slightly abnormal but he has no clinical events to suspect seizure       Past Medical History:   Diagnosis Date    Anesthesia complication     Difficult to wake up    Arthritis     BPH (benign prostatic hyperplasia)     urinary frequency    Cancer (HCC)     basal cell neck, face    COPD (chronic obstructive pulmonary disease) (HCC)     COPD exacerbation (HCC) 12/29/2019    Full dentures     Hiatal hernia     History of methicillin resistant staphylococcus aureus (MRSA)     10/11/2018 MRSA (nares) positive    Hypertension     Irritable bowel syndrome     Kidney stone     at least 7 episodes    Liver disease     Alpha 1- enzyme deficiency - diagnosed   has been on weekly replacement therapy since then    Pulmonary emphysema ([facility] [address] )     1/25/15  FEV1 - 2 45 liters or 59% of predicted    RSV infection 2017    Wears glasses     for driving only     Social History     Socioeconomic History    Marital status:      Spouse name: Not on file    Number of children: Not on file    Years of education: Not on file    Highest education level: Not on file   Occupational History    Not on file   Tobacco Use    Smoking status: Former Smoker     Packs/day: 1 00     Years: 60 00     Pack years: 60 00     Quit date: 2017     Years since quittin 4    Smokeless tobacco: Never Used    Tobacco comment: quit in 2017   Vaping Use    Vaping Use: Never used   Substance and Sexual Activity    Alcohol use: Not Currently     Comment: stopped in     Drug use: Not Currently    Sexual activity: Not on file   Other Topics Concern    Not on file   Social History Narrative    Patient is   He has three adult children; 1 daughter and 2 sons  His daughter Isai Ochoa lives closest [place] and is very involved w/ his care  Patient is not Synagogue  He lives alone  Social Determinants of Health     Financial Resource Strain: Not on file   Food Insecurity: Not on file   Transportation Needs: No Transportation Needs    Lack of Transportation (Medical): No    Lack of Transportation (Non-Medical):  No   Physical Activity: Not on file   Stress: Not on file   Social Connections: Not on file   Intimate Partner Violence: Not on file   Housing Stability: Not on file     Family History   Problem Relation Age of Onset    Emphysema Mother         never smoked    Emphysema Father     Cancer Brother         colon    Colon cancer Brother     Ulcerative colitis Family     Liver disease Family      Allergies   Allergen Reactions    Chantix [Varenicline]      Caused prostate infection     Current Outpatient Medications on File Prior to Visit   Medication Sig Dispense Refill    amLODIPine (NORVASC) 10 mg tablet TAKE ONE TABLET BY MOUTH DAILY  30 tablet 6    budesonide (PULMICORT) 0 5 mg/2 mL nebulizer solution Take 2 mL (0 5 mg total) by nebulization 2 (two) times a day 360 mL 11    busPIRone (BUSPAR) 10 mg tablet TAKE ONE TABLET BY MOUTH THREE TIMES DAILY  90 tablet 3    carbidopa-levodopa (Sinemet)  mg per tablet Start with 1/2 tablet 30 min prior to breakfast, lunch and dinner for 1 week and then take 1 full tab prior to each meal  90 tablet 3    cholestyramine (QUESTRAN) 4 g packet Take 1 packet by mouth daily      doxylamine (Unisom SleepTabs) 25 MG tablet Take 25 mg by mouth daily at bedtime as needed for sleep      finasteride (PROSCAR) 5 mg tablet TAKE ONE TABLET BY MOUTH IN THE MORNING  90 tablet 3    fluticasone (FLONASE) 50 mcg/act nasal spray 2 sprays into each nostril daily 16 g 5    formoterol (Perforomist) 20 MCG/2ML nebulizer solution Take 2 mL (20 mcg total) by nebulization 2 (two) times a day 360 mL 11    gabapentin (NEURONTIN) 300 mg capsule TAKE ONE CAPSULE BY MOUTH THREE TIMES DAILY 90 capsule 0    ipratropium-albuterol (DUO-NEB) 0 5-2 5 mg/3 mL nebulizer solution Take 3 mL by nebulization 4 (four) times a day 360 mL 5    LORazepam (ATIVAN) 1 mg tablet       mirtazapine (REMERON) 15 mg tablet Take 1 tablet (15 mg total) by mouth daily at bedtime 30 tablet 5    OXYGEN-HELIUM IN Inhale 2L at rest- 3L with activity      pantoprazole (PROTONIX) 40 mg tablet Take 1 tablet (40 mg total) by mouth 2 (two) times a day 180 tablet 3    tamsulosin (FLOMAX) 0 4 mg TAKE ONE CAPSULE BY MOUTH DAILY 90 capsule 0    Ventolin  (90 Base) MCG/ACT inhaler Inhale 2 puffs every 4 (four) hours as needed for wheezing 18 g 5    ZEMAIRA infusion once a week       [DISCONTINUED] midodrine (PROAMATINE) 2 5 mg tablet Take 1 tablet (2 5 mg total) by mouth 3 (three) times a day for 14 days Please hold medication if blood pressure is above 408 systolic  42 tablet 0    Diclofenac Sodium (VOLTAREN) 1 % Apply 2 g topically 4 (four) times a day for 7 days Apply to R knee (Patient not taking: Reported on 1/31/2022 ) 56 g 0     No current facility-administered medications on file prior to visit  Review of Systems   Refer to positive review of systems in HPI  Review of Systems    Constitutional- No fever  Eyes- No visual change  ENT- Hearing normal  CV- No chest pain  Resp- No Shortness of breath  GI- No diarrhea  - Bladder normal  MS- No Arthritis   Skin- No rash  Psych- No depression  Endo- No DM  Heme- No nodes    Vitals:    01/31/22 0946   BP: 128/73   BP Location: Left arm   Patient Position: Sitting   Cuff Size: Standard   Pulse: 80   Temp: (!) 97 2 °F (36 2 °C)   TempSrc: Tympanic   Weight: 79 4 kg (175 lb)   Height: 6' 5" (1 956 m)       PHYSICAL EXAM:  Appearance: No Acute Distress, masked facies+  Ophthalmoscopic: Disc Flat, Normal fundus  Mental status:  Orientation: Awake, Alert, and Orientedx3  Memory: Registation 3/3 Recall 1/3  Attention: normal  Knowledge: good  Language: No aphasia  Speech: No dysarthria, +softer voice  Cranial Nerves:  2 No Visual Defect on Confrontation, Pupils round, equal, reactive to light  3,4,6 Extraocular Movements Intact, no nystagmus  5 Facial Sensation Intact  7 No facial asymmetry  8 Intact hearing  9,10 Palate symmetric, normal gag  11 Good shoulder shrug  12 Tongue Midline  Gait: shuffles, slow     Coordination: No ataxia with finger to nose testing, and heel to shin  Sensory: Intact, Symmetric to pinprick, light touch, vibration, and joint position  Muscle Tone: mild increased in LE              Muscle exam:  Arm Right Left Leg Right Left   Deltoid 5/5 5/5 Iliopsoas 5/5 5/5   Biceps 5/5 5/5 Quads 5/5 5/5   Triceps 5/5 5/5 Hamstrings 5/5 5/5   Wrist Extension 5/5 5/5 Ankle Dorsi Flexion 5/5 5/5   Wrist Flexion 5/5 5/5 Ankle Plantar Flexion 5/5 5/5   Interossei 5/5 5/5 Ankle Eversion 5/5 5/5   APB 5/5 5/5 Ankle Inversion 5/5 5/5       Reflexes   RJ BJ TJ KJ AJ Plantars Chavez's   Right 2+ 2+ 2+ 2+ 2+ Downgoing Not present   Left 2+ 2+ 2+ 2+ 2+ Downgoing Not present     Personal review of  Labs:                  Diagnoses and all orders for this visit:      1  Parkinson disease ([address] )     2  Orthostatic hypertension  midodrine (PROAMATINE) 5 mg tablet       Patient's rigidity is less compared to previous visit however his dizziness limits him from doing activity standing up  Asking him to stop finesteride  Will increase midodrine to 5mg bid  Cont PT/OT  Asked him to be very careful about eating certain types of food                 Total time of encounter:  30 min  More than 50% of the time was used in counseling and/or coordination of care  Extent of counseling and/or coordination of care        Luis Link MD  [facility]  [address]  [address]  [phone]

## 2022-02-01 ENCOUNTER — OFFICE VISIT (OUTPATIENT)
Dept: PHYSICAL THERAPY | Facility: CLINIC | Age: 77
End: 2022-02-01
Payer: MEDICARE

## 2022-02-01 DIAGNOSIS — G20 PARKINSON'S DISEASE (HCC): Primary | ICD-10-CM

## 2022-02-01 PROCEDURE — 97112 NEUROMUSCULAR REEDUCATION: CPT

## 2022-02-01 NOTE — PROGRESS NOTES
Daily Note     Insurance:  AMA/CMS Eval/ Re-eval POC expires Ramona Carter #/ Referral # Total units  Start date  Expiration date Extension  Visit limitation? PT only or  PT+OT? Co-Insurance   CMS 1/05 3-30-22  Not required NA NA NA NA BOMN PT Yes, $0 copay                                                                 Today's date: 2022  Patient name: Mila Baumgarten  : 1945  MRN: [id number]  Referring provider: Genny Montalvo MD  Dx:   Encounter Diagnosis     ICD-10-CM    1  Parkinson's disease ([address] )  G20                   Subjective: Patient reports he went to neurologist who states that the lightheadedness is part of disease  Per pt and chart review, pt was prescribed new medication to address orthostatic hypotension which he began this morning  Had to take 1 break during walk back to gym and had one instance of near LOB  Objective: See treatment diary below    GAIT BELT @ START OF TREATMENT    Vitals  - HR: 87 BPM  - SPO2: 97% on 3 liters of O2    NMR     1  Large amplitude reaching from floor to ceiling- 10 reps  2  Large amplitude reaching from side to side - 8 reps B/L  3  Standing large amplitude forward step  - 10 reps bilaterally w/ TM bar to stabilize laterally   4  Standing austin amplitude side step - 10 reps bilaterally w/ TM bar to stabilize anteriorly   5  Standing backward step -  10 reps bilaterally  w/ TM bar to stabilize anteriorly  6  Sit to stand- 6 reps- 2 airex on chair     Ambulation to/from lobby with SPC, 75' x 2 each, CG/CS    Seated high amplitude reciprocal UE+LE marching   Seated large amplitude UE movements with therapy dog "Power!" targets x 4     Assessment: Patient tolerated session well today  With environmental modifications to ensure safety due to lightheadedness, was able to progress several exercises to standing in order to maximize carryover of high amplitude training into functional activities   Patient required tactile cueing approx 25% of the time to attain and maintain sufficient amplitude of UE movements  Patient did c/o lightheadedness following final standing exercise which decreased following instruction to sit and complete sitting marching  Patient required verbal and tactile cueing to achieve reciprocity of movement in seated marching  Will continue to monitor patient's tolerance to standing in light of lightheadedness and new medication to address  Patient will benefit from continued skilled therapy to improve balance, strength, and endurance for normalized gait, maximal safety, and optimal daily function  Plan: Continue per plan of care         Outcome Measures Initial Eval  1-5-22 Daily Note       5xSTS 16 87 sec, from 1 foam pad 2 UE asssit        TUG  - Regular  - Cognitive  - Carry   18 92 sec  21 00 sec  Defer        MALDONADO Defer/56        DGI Defer/24        FGA Defer/28        miniBEST Defer/28        10 meter Defer m/s 0 54 m/s       2MWT 150 ft        ABC Defer% 71 13%       PDQ-39 Defer/100

## 2022-02-03 ENCOUNTER — OFFICE VISIT (OUTPATIENT)
Dept: PHYSICAL THERAPY | Facility: CLINIC | Age: 77
End: 2022-02-03
Payer: MEDICARE

## 2022-02-03 DIAGNOSIS — G20 PARKINSON'S DISEASE (HCC): Primary | ICD-10-CM

## 2022-02-03 PROCEDURE — 97112 NEUROMUSCULAR REEDUCATION: CPT | Performed by: PHYSICAL THERAPIST

## 2022-02-03 NOTE — PROGRESS NOTES
Daily Note   (re-eval next visit)    Insurance:  AMA/CMS Eval/ Re-eval POC expires Kj Davalos #/ Referral # Total units  Start date  Expiration date Extension  Visit limitation? PT only or  PT+OT? Co-Insurance   CMS 1/5/ 22 3-30-22  Not required NA NA NA NA BOMN PT Yes, $0 copay                                                                 Today's date: 2/3/2022  Patient name: Enio Richards  : 1945  MRN: [id number]  Referring provider: Hadley Skiff, MD  Dx:   Encounter Diagnosis     ICD-10-CM    1  Parkinson's disease ([facility] [address] )  G20                   Subjective: Patient with no new complaints, arrives with portable 3L O2 and SPC  Still feeling lightheaded and is taking new medication for orthostatic hypotension  Objective: See treatment diary below    GAIT BELT @ START OF TREATMENT    Vitals  - SPO2: 95% on 3L of O2    NMR   1  Large amplitude reaching from floor to ceiling- 10 reps  2  Large amplitude reaching from side to side - 10 reps B/L  3  Standing large amplitude forward step  - 10 reps bilaterally w/ TM bar to stabilize laterally   4  Standing austin amplitude side step - 10 reps bilaterally w/ TM bar to stabilize anteriorly   5  Standing backward step -  10 reps bilaterally  w/ TM bar to stabilize anteriorly  6  Rock and reach with 1 hand on // bar to stabilize: 10 reps   7  Twist and reach with 1 hand on // bar to stabilize: 10 reps  8  Sit to stand- 10 reps- 2 airex on chair      - SpO2 96%, HR 84 bpm after above exercises    Seated high amplitude reciprocal UE+LE marching     Assessment: Patient tolerated session well today  Able to progress to all standing exercises with 1 UE support  Provided pt with handout of 2 seated exercises to be performed at home daily; pt verbalized understanding  No LOB t/o session but continued c/o lightheadedness  Pt able to walk all the way out to lobby end of session without a break, reporting this is the first time he was able to do so   SpO2 remained 95% and higher entire session  Patient will benefit from continued skilled therapy to improve balance, strength, and endurance for normalized gait, maximal safety, and optimal daily function  Plan: Continue per plan of care         Outcome Measures Initial Eval  1-5-22 Daily Note       5xSTS 16 87 sec, from 1 foam pad 2 UE asssit        TUG  - Regular  - Cognitive  - Carry   18 92 sec  21 00 sec  Defer        MALDONADO Defer/56        DGI Defer/24        FGA Defer/28        miniBEST Defer/28        10 meter Defer m/s 0 54 m/s       2MWT 150 ft        ABC Defer% 71 13%       PDQ-39 Defer/100

## 2022-02-05 DIAGNOSIS — K21.9 GASTROESOPHAGEAL REFLUX DISEASE WITHOUT ESOPHAGITIS: ICD-10-CM

## 2022-02-08 ENCOUNTER — EVALUATION (OUTPATIENT)
Dept: PHYSICAL THERAPY | Facility: CLINIC | Age: 77
End: 2022-02-08
Payer: MEDICARE

## 2022-02-08 ENCOUNTER — OFFICE VISIT (OUTPATIENT)
Dept: PALLIATIVE MEDICINE | Facility: CLINIC | Age: 77
End: 2022-02-08
Payer: MEDICARE

## 2022-02-08 VITALS
TEMPERATURE: 97.2 F | DIASTOLIC BLOOD PRESSURE: 60 MMHG | OXYGEN SATURATION: 91 % | SYSTOLIC BLOOD PRESSURE: 142 MMHG | WEIGHT: 177.6 LBS | HEART RATE: 93 BPM | BODY MASS INDEX: 21.06 KG/M2

## 2022-02-08 DIAGNOSIS — J43.2 CENTRILOBULAR EMPHYSEMA (HCC): Primary | Chronic | ICD-10-CM

## 2022-02-08 DIAGNOSIS — F41.9 ANXIOUSNESS: ICD-10-CM

## 2022-02-08 DIAGNOSIS — I27.82 OTHER CHRONIC PULMONARY EMBOLISM WITHOUT ACUTE COR PULMONALE (HCC): ICD-10-CM

## 2022-02-08 DIAGNOSIS — I87.2 CHRONIC VENOUS INSUFFICIENCY: ICD-10-CM

## 2022-02-08 DIAGNOSIS — Z51.5 PALLIATIVE CARE PATIENT: ICD-10-CM

## 2022-02-08 DIAGNOSIS — R26.81 UNSTEADY GAIT: Chronic | ICD-10-CM

## 2022-02-08 DIAGNOSIS — G47.00 INSOMNIA, UNSPECIFIED TYPE: ICD-10-CM

## 2022-02-08 DIAGNOSIS — J96.11 CHRONIC RESPIRATORY FAILURE WITH HYPOXIA (HCC): Chronic | ICD-10-CM

## 2022-02-08 DIAGNOSIS — K21.9 GASTROESOPHAGEAL REFLUX DISEASE, UNSPECIFIED WHETHER ESOPHAGITIS PRESENT: ICD-10-CM

## 2022-02-08 DIAGNOSIS — I27.20 PULMONARY HYPERTENSION (HCC): ICD-10-CM

## 2022-02-08 DIAGNOSIS — G20 PARKINSON'S DISEASE (HCC): ICD-10-CM

## 2022-02-08 DIAGNOSIS — E88.01 AAT (ALPHA-1-ANTITRYPSIN) DEFICIENCY (HCC): Chronic | ICD-10-CM

## 2022-02-08 DIAGNOSIS — R41.3 MEMORY PROBLEM: ICD-10-CM

## 2022-02-08 DIAGNOSIS — R45.89 DYSPHORIC MOOD: ICD-10-CM

## 2022-02-08 DIAGNOSIS — G20 PARKINSON'S DISEASE (HCC): Primary | ICD-10-CM

## 2022-02-08 PROCEDURE — 97530 THERAPEUTIC ACTIVITIES: CPT

## 2022-02-08 PROCEDURE — 99214 OFFICE O/P EST MOD 30 MIN: CPT | Performed by: INTERNAL MEDICINE

## 2022-02-08 NOTE — PATIENT INSTRUCTIONS
It was good to see you today  Thank you for coming in  · No medication changes today - I'm glad you're medications are helping w/ symptom control  · I'm glad you've met with Sleep Medicine! Continue to work with them as well as the lung doctors and your primary care team   · Keep up the good work with physical therapy! · Return in about 2-3 months  · Call us for refills on medications that we supply, as needed  · If something changes and you need to come in sooner, please call our office  PRESCRIPTION REFILL REMINDER:  All medication refills should be requested prior to [facility] on Friday  Any refill requests after noon on Friday would be addressed the following Monday

## 2022-02-08 NOTE — PROGRESS NOTES
Follow-up with Palliative and Supportive Care  Javi Pac 65 y o  male [phone]    ASSESSMENT & PLAN:  1  Centrilobular emphysema ([facility] [address] )    2  AAT (alpha-1-antitrypsin) deficiency ([facility] )    3  Chronic respiratory failure with hypoxia (HCC)    4  Gastroesophageal reflux disease, unspecified whether esophagitis present    5  Other chronic pulmonary embolism without acute cor pulmonale ([facility])    6  Pulmonary hypertension ([facility] )    7  Chronic venous insufficiency    8  Parkinson's disease ([facility] )    9  Unsteady gait    10  Insomnia, unspecified type    11  Dysphoric mood    12  Anxiousness    13  Memory problem    14  Palliative care patient           Ongoing issues w/ short-term memory  Weakness/gait has improved, though   Patient denies issues w/ driving, stating he "was tested" by his PCP and there were no issues   Again recommended patient use his Rollator walker at all times when ambulating to prevent falls, assist w/ carrying the weight of his O2 concentrator   Respiratory status stable  Continue supplemental O2 via NC   Patient has established w/ Sleep Medicine; appreciate recommendations  Continue mirtazapine as this is helpful for insomnia   Olanzapine discontinued   Patient has stated on multiple occasions that he does not wish to engage in ACP  He has states he is comfortable w/ his daughter Ivan Garcia making decisions for him  He wishes to continue disease-directed care w/o limit   Patient has received [address] vaccinations   Reviewed notes (Care Coordination, PT, Sleep Medicine, Neurology), labs (11/18/21: Cr 1 12, eGFR 63, Hb 12 5; alb 4 2 on 11/17/21), imaging (1/10/22 CT lung screening)   Emotional support provided   Medication safety issues addressed - no driving under the influence of narcotics, watch for adverse effects including AMS and respiratory depression, keep medications stored in a safe/locked environment        Requested Prescriptions      No prescriptions requested or ordered in this encounter       There are no discontinued medications  Representatives have queried the patient's controlled substance dispensing history in the Prescription Drug Monitoring Program in compliance with regulations before I have prescribed any controlled substances  The prescription history is consistent with prescribed therapy and our practice policies  30+ minutes were spent face to face with patient with greater than 50% of the time spent in counseling or coordination of care including discussions of symptom assessment and management, medication review, psychosocial support, chart review, imaging review, lab review, supportive listening and anticipatory guidance  All of the patient's questions were answered during this discussion  Return in about 2 months (around 4/8/2022)  SUBJECTIVE:  Chief Complaint   Patient presents with    Follow-up    COPD    Shortness of Breath    Parkinson's Disease    Memory Loss    Insomnia    Counseling    Anxiety    Gait Problem        WILL Paredes is a 65 y o  male w/ severe emphysema w/ alpha-1-antitrypsin deficiency, chronic hypoxic respiratory failure on 3LPM O2, pulmonary hypertension w/ chronic PE, Parkinsons disease, former tobacco dependence (60 pack-years, quit 2017), OA, BPH, IBS, idiopathic neuropathy, h/o BCC of the face and neck, anxiousness, insomnia  He follows w/ Skip Armenta PA-C / Dr Cole Chandra (Pulmonology), Dr Jania Aleman (Cardiology), Dr Jyotsna Mattson (Sleep Medicine)  Patient is known to [facility]; last seen by me 12/14/21 for symptom management, psychosocial support  Patient recognizes that he has been having memory issues in recent months (does not remember details of previous provider encounters, etc)  He states this is s/t his "palsy" (possible referring to his Parkinson's)  He clearly denies any awareness of issues w/ his driving safety and does not wish to discuss this   He states his daughter is unavailable to take him to appointments, etc, during the day and he "only drives to doctor visits"  He feels his gait is adequately supported out of the home with the single-point cane; he does not feel he needs his Rollator when out of the home  Patient states he feels his respiratory status is "getting better"  He feels anxiousness is manages, sleep has improved, and he denies depressed mood  He feels his appetite has improved (and chart review reflects he is slowly and steadily gaining weight)  He feels the mirtazapine has been helpful  He notes some recent diarrhea that he takes "a powder" for (he is unaware of the name of the medication)  He does not recall the names of some of his other medications, tending to remember doses and timing instead  PDMP shows no concerns  The following portions of the medical history were reviewed: past medical history, problem list, medication list, and social history        Current Outpatient Medications:     amLODIPine (NORVASC) 10 mg tablet, TAKE ONE TABLET BY MOUTH DAILY , Disp: 30 tablet, Rfl: 6    budesonide (PULMICORT) 0 5 mg/2 mL nebulizer solution, Take 2 mL (0 5 mg total) by nebulization 2 (two) times a day, Disp: 360 mL, Rfl: 11    busPIRone (BUSPAR) 10 mg tablet, TAKE ONE TABLET BY MOUTH THREE TIMES DAILY , Disp: 90 tablet, Rfl: 3    carbidopa-levodopa (Sinemet)  mg per tablet, Start with 1/2 tablet 30 min prior to breakfast, lunch and dinner for 1 week and then take 1 full tab prior to each meal , Disp: 90 tablet, Rfl: 3    cholestyramine (QUESTRAN) 4 g packet, Take 1 packet by mouth daily, Disp: , Rfl:     doxylamine (Unisom SleepTabs) 25 MG tablet, Take 25 mg by mouth daily at bedtime as needed for sleep, Disp: , Rfl:     finasteride (PROSCAR) 5 mg tablet, TAKE ONE TABLET BY MOUTH IN THE MORNING , Disp: 90 tablet, Rfl: 3    fluticasone (FLONASE) 50 mcg/act nasal spray, 2 sprays into each nostril daily, Disp: 16 g, Rfl: 5    formoterol (Perforomist) 20 MCG/2ML nebulizer solution, Take 2 mL (20 mcg total) by nebulization 2 (two) times a day, Disp: 360 mL, Rfl: 11    gabapentin (NEURONTIN) 300 mg capsule, TAKE ONE CAPSULE BY MOUTH THREE TIMES DAILY, Disp: 90 capsule, Rfl: 0    ipratropium-albuterol (DUO-NEB) 0 5-2 5 mg/3 mL nebulizer solution, Take 3 mL by nebulization 4 (four) times a day, Disp: 360 mL, Rfl: 5    LORazepam (ATIVAN) 1 mg tablet, , Disp: , Rfl:     midodrine (PROAMATINE) 5 mg tablet, Take 1 tab (5mg) three times daily  Please hold medication if blood pressure is above 948 systolic , Disp: 90 tablet, Rfl: 4    mirtazapine (REMERON) 15 mg tablet, Take 1 tablet (15 mg total) by mouth daily at bedtime, Disp: 30 tablet, Rfl: 5    OXYGEN-HELIUM IN, Inhale 2L at rest- 3L with activity, Disp: , Rfl:     pantoprazole (PROTONIX) 40 mg tablet, Take 1 tablet (40 mg total) by mouth 2 (two) times a day, Disp: 180 tablet, Rfl: 3    tamsulosin (FLOMAX) 0 4 mg, TAKE ONE CAPSULE BY MOUTH DAILY, Disp: 90 capsule, Rfl: 0    Ventolin  (90 Base) MCG/ACT inhaler, Inhale 2 puffs every 4 (four) hours as needed for wheezing, Disp: 18 g, Rfl: 5    ZEMAIRA infusion, once a week , Disp: , Rfl:     Diclofenac Sodium (VOLTAREN) 1 %, Apply 2 g topically 4 (four) times a day for 7 days Apply to R knee (Patient not taking: Reported on 1/31/2022 ), Disp: 56 g, Rfl: 0    Review of Systems   Constitutional: Positive for activity change, appetite change (improved), fatigue and unexpected weight change (slowly gaining weight)  Eyes: Negative for pain and redness  Respiratory: Positive for chest tightness and shortness of breath  Gastrointestinal: Positive for diarrhea (occasional)  Negative for constipation and nausea  Endocrine: Negative for polydipsia and polyphagia  Musculoskeletal: Positive for arthralgias and gait problem  Skin: Positive for pallor  Allergic/Immunologic: Positive for immunocompromised state     Neurological: Negative for facial asymmetry  Neuropathic pain (controlled)  Psychiatric/Behavioral: Positive for decreased concentration and sleep disturbance (improved)  Negative for dysphoric mood  The patient is nervous/anxious (managed)  OBJECTIVE:  /60 (BP Location: Right arm, Patient Position: Sitting, Cuff Size: Standard)   Pulse 93   Temp (!) 97 2 °F (36 2 °C) (Temporal)   Wt 80 6 kg (177 lb 9 6 oz)   SpO2 91%   BMI 21 06 kg/m²   Physical Exam  Vitals reviewed  Constitutional:       General: He is not in acute distress  Appearance: He is underweight  He is ill-appearing (chronically)  He is not toxic-appearing  Interventions: Nasal cannula in place  HENT:      Head: Normocephalic and atraumatic  Right Ear: External ear normal       Left Ear: External ear normal    Eyes:      General: No scleral icterus  Right eye: No discharge  Left eye: No discharge  Extraocular Movements: Extraocular movements intact  Conjunctiva/sclera: Conjunctivae normal       Pupils: Pupils are equal, round, and reactive to light  Cardiovascular:      Rate and Rhythm: Normal rate  Pulmonary:      Effort: Pulmonary effort is normal  No tachypnea, bradypnea, accessory muscle usage or respiratory distress  Comments: 2LPM via NC  Abdominal:      General: There is no distension  Tenderness: There is no guarding  Musculoskeletal:      Right lower leg: No edema  Left lower leg: No edema  Skin:     General: Skin is dry  Coloration: Skin is pale  Neurological:      Mental Status: He is alert and oriented to person, place, and time  Cranial Nerves: No facial asymmetry  Gait: Gait abnormal (using SPC)  Psychiatric:         Attention and Perception: Attention normal          Mood and Affect: Mood and affect normal          Speech: Speech normal          Behavior: Behavior normal  Behavior is cooperative  Thought Content:  Thought content normal          Cognition and Memory: Cognition normal  Memory is impaired  Yves Murrieta MD  [facility] Palliative and Supportive Care      Portions of this document may have been created using dictation software and as such some "sound alike" terms may have been generated by the system  Do not hesitate to contact me with any questions or clarifications  This note was not shared with the patient due to reasonable likelihood of causing patient harm

## 2022-02-08 NOTE — PROGRESS NOTES
PT-Re-Eval          Insurance:  AMA/CMS Eval/ Re-eval POC expires Rodrick Favor #/ Referral # Total units  Start date  Expiration date Extension  Visit limitation? PT only or  PT+OT? Co-Insurance                                                                                Date               Units:  Used               Authed:  Remaining                     Date               Units:  Used               Authed:  Remaining                           Today's date: 2022  Patient name: Marely Schmidt  : 1945  MRN: [id number]  Referring provider: Annette Marie MD  Dx:   Encounter Diagnosis     ICD-10-CM    1  Parkinson's disease ([facility] [address] )  G20              Assessment  Assessment details: Patient is a 65 y o  Male who presents to skilled outpatient PT with a diagnosis of PD occurring 3 months ago  Patient demonstrates the following gait deficits : bradykinesia, shuffling gait, decreased reciprocal arm swing, absent trunk rotation, mild-moderate postural instability, and forward head and shoulder posture  Patient overall illustrating slight improvements with 5x STS and 6 MWT, but more significant improvements with TUG regular and cognitive  Added in the TUG dual and MALDONADO measurements this session  Patient illustrating that he is still a HIGH risk for falls according to each of his TUG scores, 5x STS, gait speed and MALDONADO measurements  Patient is a good candidate for occupational therapy to address potential cognitive deficits and is in agreement  Plan to schedule patient following this progress note  Reduced cardiovascular endurance, compounded by long standing COPD, indicated by being unable to complete a full 6 MWT this session  Patient did illustrate slight improvements this progress note as compared to previous as he was able to complete up to 3 minutes of consistent walking versus 2 minutes previously   He presents with the following symptoms that are consistent with Alan and Yahr stage 3: mild to moderate postural instability and independent  Per research provided by [employer], patient will benefit from skilled outpatient PT services to improve and maximize his function, to reduce risk for falls and potential injuries associated with falls, reduce healthcare costs via hospitalization, and reduce patient and national healthcare costs  In early stages of Parkinson's Disease, research indicates that intensive physical exercise can improve patient's motor control and assist in slowing the disease progression as a neuroprotective agent  Patient will benefit from skilled outpatient PT in order to maximize function in the short-term and long-term with overall improved postural strength with associated stability and mobility  Impairments: Abnormal coordination, Abnormal gait, Abnormal or restricted ROM, Activity intolerance, Impaired balance, Impaired physical strength, Lacks appropriate HEP, Poor posture, Poor body mechanics, Safety issue and Abnormal movement  Understanding of Dx/Px/POC: Fair  Prognosis: Good      Patient verbalized understanding of POC  Please contact me if you have any questions or recommendations  Thank you for the referral and the opportunity to share in [employer] care             Plan  Program: [facility]  Patient would benefit from: Skilled PT, OT, Speech  Planned therapy interventions: Abdominal trunk stabilization, ADL training, Balance, Balance/WB training, Breathing training, Body mechanics training, Coordination, Functional ROM exercises, Gait training, HEP, Motor coordination training, Neuromuscular re-education, Patient education, Postural training, Strengthening, Stretching, Therapeutic activities, Therapeutic exercises and Transfer training  Frequency: 2-3x/wk   Duration in weeks: 12 weeks  Plan of Care beginning date: 1/5/22  Plan of Care expiration date: 12 weeks - 3/30/2022  Treatment plan discussed with: Patient         Goals  Short Term Goals:  - Patient will improve TUG score by MDC of 4 8 sec from 18 92 sec to 14 12 sec in order to reduce risk of falls and to increase safety with functional mobility- NOT MET  - Patient will improve 5 STS by the [address] of 2 3 sec from 16 87 sec to 14 57 sec indicating an improvement in strength and decreased challenge with transfers- NOT MET  - Patient will perform 2 MWT to promote improvement in cardiovascular endurance in order to maximize function with functional mobility throughout the community- MET  - Patient will be independent in basic HEP in order to manage condition at home- Progressing    Long Term Goals:  - Patient will score a low risk for falls 2/4 fall risks measures  - Patient will score age norm values for 1/2 endurance measures  - Patient will be able to ambulate on uneven surfaces with 50% reduction in near falls to increase safety with community mobility  - Patient will be able to perform a floor transfer without physical assistance to assist with fall recovery at home and in the community  - Patient will be independent in comprehensive HEP post discharge from program  - Patient will be able to walk up incline with decreased difficulty and no loss of balance in order to improve functional mobility throughout the community  - Patient will be able to perform sit to stands from softer surfaces such as a couch or bed in order to improvement function with transfers        Cut off score   All date taken from [facility] Section or Rehab Measures      MALDONADO Cutoff Scores:  MDC: 5 pts  Falls Risk Cutoff: 40 22/56 DGI Cutoff Scores:  Betinata Bene al 2011, MDC: 2 9 pts  Claudine et al 2008, Arlene: Score <19/24   MiniBEST Cutoff Scores:  Capri monteiro 2011, MDC: 5 52 pts  Mark and Arlon Angelucci 2013, [place]: <19/28 5xSTS Cutoff Scores:  MDC: 2 3 sec  Leena Lara et al, 2011, Arlene: > 16 sec   TUG Cutoff Scores:  Linda Khalil et al, 2011, MDC: 4 8 sec  Srinivasan Alex et al, 2011, [place]: Meds ON: < 12 21 sec, OFF: 15 5 sec 10 Meter Walk Test Cutoff Scores:  Mickie Carlos and Jorge, 2008, [address]: 0 18 m/s  Age Norm Values, Arlene: < 1 0 m/s   ABC Cutoff Scores:  Epifanio Boston, 2008, MDC: 13 pts  Joan Eva, MDC: 11 12 pts  Increased risk for falls: < 69% 6 Minute Walk Test Cutoff Scores:  Michael Ronald and Jorge, 2008, MDC: 269 ft  Edi et al, 2002, Norm Data Healthy Adults:  61 - 71 years:   M: 200 m      F: 1 m  79 - 78 years:   M: 1 m      F: 200 m  [de-identified] - 80 years:   M: 1 m      F: 80 m   PDQ-39 Cutoff Scores:  Joaquín monteiro, 2004:  MDC: Mobility (12 24), ADL (16 72), Emotional (14 22), Stigma (21 21), Social Support (21 25), Cognition (21 12), Communication (21 04), Bodily Discomfort (24 48)  Jorge et al, 2007:  Normative Data: Mobility (49 25), ADL (38 94), Emotional (37 92), Stigma (27 54), Social Support (14 78), Cognition (33 03), Communication (27 99), Bodily Discomfort (40 91) Alan and Yahr Stages  Stage 0: No S/S  Stage 1: Mild unilateral symptoms  Stage 1 5: Unilateral and axial symptoms  Stage 2: Bilateral symptoms, no balance impairment  Stage 2 5: Mild bilateral symptoms and recovery with pull test  Stage 3: Mild to moderate postural instability, independent  Stage 4: Severe disability, walking or standing unassisted  Stage 5: Bed bound, w/c bound           Subjective Evaluation    History of Present Illness  Mechanism of injury: PD dx 3 months ago, difficulty with balance  Primary AD: cane or walker  Previous Programs: Physical therapy for COPD, but not for PD      Social Support  Steps to enter house: 2 step front entrance, 1 through garage  Stairs in house: no  Lives in: house  Lives with: alone    Employment status: retired  Hand dominance: right handed    Treatments  Previous treatment: PT for COPD, but not yet for PD dx      Objective   Gait abnormalities: slow gait speed, shuffling gait, decrease reciprocal arm swing, absent trunk rotation, forward head and shoulder posture      Outcome Measures Initial Eval  1-5-22 Daily Note PN  2/8/22      5xSTS 16 87 sec, from 1 foam pad 2 UE asssit  16 27 sec from 1 foam pad 2 UE asssit      TUG  - Regular  - Cognitive  - Carry   18 92 sec  21 00 sec  Defer    - 15 64 sec w/ SPC  - 16 53 sec w/ SPC  - 16 69 sec w/ SPC      MALDONADO Defer/56  22/56      miniBEST Defer/28  defer      10 meter Defer 0 54 m/s 0 43 m/s      2MWT Defer ft 150 ft Performed 3 MWT      ABC Defer% 71 13% 76 3%      3 MWT   220 ft                    Precautions: Requires O2 via nasal canula, fall risk  Past Medical History:   Diagnosis Date    Anesthesia complication     Difficult to wake up    Arthritis     BPH (benign prostatic hyperplasia)     urinary frequency    Cancer (HCC)     basal cell neck, face    COPD (chronic obstructive pulmonary disease) (HCC)     COPD exacerbation (HCC) 12/29/2019    Full dentures     Hiatal hernia     History of methicillin resistant staphylococcus aureus (MRSA)     10/11/2018 MRSA (nares) positive    Hypertension     Irritable bowel syndrome     Kidney stone     at least 7 episodes    Liver disease     Alpha 1- enzyme deficiency - diagnosed 2002   has been on weekly replacement therapy since then    Pulmonary emphysema ([address] )     1/25/15  FEV1 - 2 45 liters or 59% of predicted    RSV infection 12/2017    Wears glasses     for driving only

## 2022-02-10 ENCOUNTER — OFFICE VISIT (OUTPATIENT)
Dept: PHYSICAL THERAPY | Facility: CLINIC | Age: 77
End: 2022-02-10
Payer: MEDICARE

## 2022-02-10 DIAGNOSIS — G20 PARKINSON'S DISEASE (HCC): Primary | ICD-10-CM

## 2022-02-10 DIAGNOSIS — I10 ORTHOSTATIC HYPERTENSION: ICD-10-CM

## 2022-02-10 PROCEDURE — 97112 NEUROMUSCULAR REEDUCATION: CPT

## 2022-02-10 RX ORDER — PANTOPRAZOLE SODIUM 40 MG/1
TABLET, DELAYED RELEASE ORAL
Qty: 180 TABLET | Refills: 0 | Status: SHIPPED | OUTPATIENT
Start: 2022-02-10 | End: 2022-05-31

## 2022-02-10 NOTE — TELEPHONE ENCOUNTER
Pt calling to inform that the midodrine he is taking does not seem to be effective  Today at PT, pt experiencing lightheadedness and dizziness  States he is not doing to well when he stands but especially does not do well during exercises  Pt stating physical therapy advised him to call and see if there is something else he can take  Pt uses [facility] in [place] in case of alternative medication  Best  [phone], ok to leave detailed message,    Dr Collette Corey - Please advise

## 2022-02-10 NOTE — PROGRESS NOTES
Daily Note   RE 22  POC 3/30/22    Insurance:  AMA/CMS Eval/ Re-eval POC expires Maday Qiu #/ Referral # Total units  Start date  Expiration date Extension  Visit limitation? PT only or  PT+OT? Co-Insurance   CMS 1/5/ 22 3-30-22  Not required NA NA NA NA BOMN PT Yes, $0 copay                                                                 Today's date: 2/10/2022  Patient name: José Luis Cardozo  : 1945  MRN: [id number]  Referring provider: Cata Nuno MD  Dx:   Encounter Diagnosis     ICD-10-CM    1  Parkinson's disease ([facility] [address] )  G20                   Subjective: Patient with no new complaints, arrives with portable 3L O2  Forgot his SPC so used one from clinic  Still feeling lightheaded and is taking new medication for orthostatic hypotension  Objective: See treatment diary below    GAIT BELT @ START OF TREATMENT    Vitals  - SPO2: 95% on 3L of O2    NMR   1  Large amplitude reaching from floor to ceiling- 10 reps  2  Large amplitude reaching from side to side - 6 reps B/L  3  Standing large amplitude forward step  - 8 reps bilaterally w/ TM bar to stabilize laterally   4  Standing austin amplitude side step - 10 reps bilaterally w/ TM bar to stabilize anteriorly   5  Standing backward step -  10 reps bilaterally  w/ TM bar to stabilize anteriorly  6  Rock and reach with 1 hand on // bar to stabilize: 10 reps   7  Sit to stand- 10 reps throughout session- no airex on chair today      Assessment: Patient tolerated session fairly today  Patient continues to c/o lightheadedness when exercising in standing so limited duration of sets of standing exercises with seated breaks in between  Noted SpO2 at 89% while using 3L supplemental O2 during seated rest break but devin to 93% within 20 seconds  Suggested patient contact his doctor who prescribed medication for lightheadedness if it continues to not take effect; patient verbalized understanding   Patient continues to require seated break when ambulating to/from lobby 2/2 fatigue; may consider NuStep next session to improve endurance while stressing reciprocity of movement  Patient will benefit from continued skilled therapy to improve balance, strength, and endurance for normalized gait, maximal safety, and optimal daily function  Plan: Continue per plan of care         Outcome Measures Initial Eval  1-5-22 Daily Note       5xSTS 16 87 sec, from 1 foam pad 2 UE asssit        TUG  - Regular  - Cognitive  - Carry   18 92 sec  21 00 sec  Defer        MALDONADO Defer/56        DGI Defer/24        FGA Defer/28        miniBEST Defer/28        10 meter Defer m/s 0 54 m/s       2MWT 150 ft        ABC Defer% 71 13%       PDQ-39 Defer/100

## 2022-02-14 RX ORDER — MIDODRINE HYDROCHLORIDE 10 MG/1
TABLET ORAL
Qty: 90 TABLET | Refills: 4 | Status: SHIPPED | OUTPATIENT
Start: 2022-02-14 | End: 2022-06-22

## 2022-02-15 ENCOUNTER — OFFICE VISIT (OUTPATIENT)
Dept: PHYSICAL THERAPY | Facility: CLINIC | Age: 77
End: 2022-02-15
Payer: MEDICARE

## 2022-02-15 DIAGNOSIS — G20 PARKINSON'S DISEASE (HCC): Primary | ICD-10-CM

## 2022-02-15 PROCEDURE — 97112 NEUROMUSCULAR REEDUCATION: CPT

## 2022-02-15 PROCEDURE — 97110 THERAPEUTIC EXERCISES: CPT

## 2022-02-15 NOTE — PROGRESS NOTES
Daily Note   RE 22  POC 3/30/22    Insurance:  AMA/CMS Eval/ Re-eval POC expires Delia Osuna #/ Referral # Total units  Start date  Expiration date Extension  Visit limitation? PT only or  PT+OT? Co-Insurance   CMS 1/5/ 22 3-30-22  Not required NA NA NA NA BOMN PT Yes, $0 copay                                                                 Today's date: 2/15/2022  Patient name: Javi Betts  : 1945  MRN: [id number]  Referring provider: Jacinta Babin MD  Dx:   Encounter Diagnosis     ICD-10-CM    1  Parkinson's disease ([facility] [address] )  G20                   Subjective: Patient arrives with portable O2 at 3L and SPC  Reports he spoke to neurologist who doubled his dose of medication for lightheadedness  Per chart review is taking midodrine now  Objective: See treatment diary below    GAIT BELT @ START OF TREATMENT    Vitals  - SPO2: 99% on 3L of O2    NMR   1  Large amplitude reaching from floor to ceiling - 10 reps  2  Large amplitude reaching from side to side - 6 reps B/L  3  Standing large amplitude forward step  - 10 reps bilaterally w/ bar to stabilize laterally   4  Standing [place]in amplitude side step - 10 reps bilaterally w/ bar to stabilize anteriorly   5  Sit to stand- 5 reps, 2 sets, 2 airex on chair    TE:   - 10 minutes total on NuStep, 2 5 min @ level 1, 7 5 min @ level 4; monitored HR and SpO2 throughout with maintaining in therapeutic range   - Ambulation to/from gym, (75' x 2) x 2     Assessment: Patient tolerated session well today  Due to patient reports of overall weakness/fatigue, initiated training on NuStep today to improve endurance while facilitating reciprocal motion of UE and LE as needed for gait  Patient tolerated well with no SOB or significant fatigue; may increase level next session  Patient able to perform large amplitude exercises with cueing 25% of the time for technique or amplitude   Patient will benefit from continued skilled therapy to improve endurance, strength, and balance and normalize gait for maximal daily function at home and in community  Plan: Continue per plan of care  Outcome Measures Initial Eval  1-5-22 Daily Note PN  2/8/22      5xSTS 16 87 sec, from 1 foam pad 2 UE asssit  16 27 sec from 1 foam pad 2 UE asssit      TUG  - Regular  - Cognitive  - Carry   18 92 sec  21 00 sec  Defer    - 15 64 sec w/ SPC  - 16 53 sec w/ SPC  - 16 69 sec w/ SPC      MALDONADO Defer/56  22/56      miniBEST Defer/28  defer      10 meter Defer 0 54 m/s 0 43 m/s      2MWT Defer ft 150 ft Performed 3 MWT      ABC Defer% 71 13% 76 3%      3 MWT   220 ft                    Precautions: Requires O2 via nasal canula, fall risk  Past Medical History:   Diagnosis Date    Anesthesia complication     Difficult to wake up    Arthritis     BPH (benign prostatic hyperplasia)     urinary frequency    Cancer (HCC)     basal cell neck, face    COPD (chronic obstructive pulmonary disease) (HCC)     COPD exacerbation ([facility] [address] ) 12/29/2019    Full dentures     Hiatal hernia     History of methicillin resistant staphylococcus aureus (MRSA)     10/11/2018 MRSA (nares) positive    Hypertension     Irritable bowel syndrome     Kidney stone     at least 7 episodes    Liver disease     Alpha 1- enzyme deficiency - diagnosed 2002   has been on weekly replacement therapy since then    Pulmonary emphysema ([address] )     1/25/15  FEV1 - 2 45 liters or 59% of predicted    RSV infection 12/2017    Wears glasses     for driving only

## 2022-02-17 ENCOUNTER — APPOINTMENT (OUTPATIENT)
Dept: PHYSICAL THERAPY | Facility: CLINIC | Age: 77
End: 2022-02-17
Payer: MEDICARE

## 2022-02-22 ENCOUNTER — OFFICE VISIT (OUTPATIENT)
Dept: PHYSICAL THERAPY | Facility: CLINIC | Age: 77
End: 2022-02-22
Payer: MEDICARE

## 2022-02-22 ENCOUNTER — EVALUATION (OUTPATIENT)
Dept: OCCUPATIONAL THERAPY | Facility: CLINIC | Age: 77
End: 2022-02-22
Payer: MEDICARE

## 2022-02-22 DIAGNOSIS — G20 PARKINSON DISEASE (HCC): Primary | ICD-10-CM

## 2022-02-22 DIAGNOSIS — G20 PARKINSON'S DISEASE (HCC): Primary | ICD-10-CM

## 2022-02-22 PROCEDURE — 97112 NEUROMUSCULAR REEDUCATION: CPT | Performed by: PHYSICAL THERAPIST

## 2022-02-22 PROCEDURE — 97167 OT EVAL HIGH COMPLEX 60 MIN: CPT

## 2022-02-22 PROCEDURE — 97110 THERAPEUTIC EXERCISES: CPT | Performed by: PHYSICAL THERAPIST

## 2022-02-22 NOTE — PROGRESS NOTES
Today's Date: 2022  Patient Name: Francie Sun  : 1945  MRN: [id number]  Referring Provider: Mina Jenkins MD  Dx: Parkinson disease [facility]) [G20]    Active Problem List:   Patient Active Problem List   Diagnosis    AAT (alpha-1-antitrypsin) deficiency ([address] )    Gastroesophageal reflux disease    Causalgia of lower limb    Essential hypertension    Acute on chronic respiratory failure ([address] )    BPH (benign prostatic hyperplasia)    Liver disease    Former smoker    Chest pain at rest    Pulmonary embolus ([address] )    Chronic respiratory failure with hypoxia ([address] )    CLAYTON (acute kidney injury) ([address] )    Centrilobular emphysema ([address] )    Lightheadedness    Weakness generalized    Elevated PSA    Skin lesion of right lower extremity    Herpes labialis    Pain and swelling of left lower extremity    Chronic venous insufficiency    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 (HCC)    Insomnia    Pulmonary hypertension ([address] )    Cerumen impaction    Transaminitis    Other constipation    Ambulatory dysfunction    Dysphagia    Allergic rhinitis    Balance problem    Benign colon polyp    Cataracts, both eyes    Chronic diarrhea of unknown origin    Nocturnal leg cramps    Peripheral neuropathy    Bilateral leg edema    Thrombocytopenia (HCC)    COPD (chronic obstructive pulmonary disease) ([address] )    Left leg swelling    Syncope    Orthostatic hypotension with spine hypertension    Sialadenitis    Palliative care patient    Dysphoric mood    Unsteady gait    Pain of right lower extremity    Vitamin D deficiency disease    Avascular necrosis of hip, right ([address] )    Depression, recurrent ([address] )    Weight loss, unintentional    Loss of appetite    Anxiousness    Episode of confusion    Parkinson's disease ([address] )    Memory problem     Past Medical Hx:   Past Medical History:   Diagnosis Date    Anesthesia complication     Difficult to wake up    Arthritis  BPH (benign prostatic hyperplasia)     urinary frequency    Cancer (HCC)     basal cell neck, face    COPD (chronic obstructive pulmonary disease) (HCC)     [facility] exacerbation [facility] ) 12/29/2019    Full dentures     Hiatal hernia     History of methicillin resistant staphylococcus aureus (MRSA)     10/11/2018 MRSA (nares) positive    Hypertension     Irritable bowel syndrome     Kidney stone     at least 7 episodes    Liver disease     Alpha 1- enzyme deficiency - diagnosed 2002  has been on weekly replacement therapy since then    Pulmonary emphysema ([facility] [facility] )     1/25/15  FEV1 - 2 45 liters or 59% of predicted    RSV infection 12/2017    Wears glasses     for driving only     Past Surgical Hx:   Past Surgical History:   Procedure Laterality Date    BACK SURGERY  2008    discectomy    COLONOSCOPY      COLONOSCOPY N/A 3/10/2017    Procedure: Mar Cough;  Surgeon: Opal Diaz MD;  Location: [facility] [place] [facility]; Service:    Hale Forget      removal of kidney stones    ESOPHAGOGASTRODUODENOSCOPY N/A 3/10/2017    Procedure: ESOPHAGOGASTRODUODENOSCOPY (EGD); Surgeon: Opal Diaz MD;  Location: [facility]; Service:     LITHOTRIPSY      VT ESOPHAGOGASTRODUODENOSCOPY TRANSORAL DIAGNOSTIC N/A 11/20/2018    Procedure: ESOPHAGOGASTRODUODENOSCOPY (EGD); Surgeon: Opal Diaz MD;  Location: [facility]; Service: Gastroenterology    TONSILLECTOMY      VEIN LIGATION AND STRIPPING Bilateral     1980's          SKILLED ANALYSIS:  Pt is a 65 y o  male referred to Occupational Therapy by physical therapy team 2* cognitive concerns  Pt with recent dx of Parkinson's Disease and presents with coordination and cognitive deficits due to effects of  parkinson's disease and demonstrating difficulty in completing IADLs safely  Pt demonstrating deficits based on the following assessments:  MoCA, CMT, Avery Making Tests A and B, and Physical Performance Test   Pt would benefit from OT services focusing on impairments for the next 8-12 weeks  Pt educated on OT services and recommendations, and pt is in agreement  Subjective    "You wouldn't know that I had that disease except sometimes I'm lightheaded"    PATIENT GOAL: to be independent    OCCUPATIONAL PROFILE:  Pt lives alone with 1 TEMI  He has kids, one of which is local and visits 1x/wk  Daughter assists with food shopping, and pt has a cleaning service  He is completely independent with ADLs  He does the cooking, money and med mgmt, driving  Denies any recent falls  HISTORY OF PRESENT ILLNESS:     Pt is a 65 y o  male who was referred to Occupational Therapy s/p dx of PD       PMH:   Past Medical History:   Diagnosis Date    Anesthesia complication     Difficult to wake up    Arthritis     BPH (benign prostatic hyperplasia)     urinary frequency    Cancer (HCC)     basal cell neck, face    COPD (chronic obstructive pulmonary disease) (HCC)     COPD exacerbation ([facility] ) 12/29/2019    Full dentures     Hiatal hernia     History of methicillin resistant staphylococcus aureus (MRSA)     10/11/2018 MRSA (nares) positive    Hypertension     Irritable bowel syndrome     Kidney stone     at least 7 episodes    Liver disease     Alpha 1- enzyme deficiency - diagnosed 2002  has been on weekly replacement therapy since then    Pulmonary emphysema ([facility] [facility] )     1/25/15  FEV1 - 2 45 liters or 59% of predicted    RSV infection 12/2017    Wears glasses     for driving only       Past Surgical Hx:   Past Surgical History:   Procedure Laterality Date    BACK SURGERY  2008    discectomy    COLONOSCOPY      COLONOSCOPY N/A 3/10/2017    Procedure: Marylen Cheek;  Surgeon: Sharifa Saenz MD;  Location: [facility]; Service:    [place] Boy      removal of kidney stones    ESOPHAGOGASTRODUODENOSCOPY N/A 3/10/2017    Procedure: ESOPHAGOGASTRODUODENOSCOPY (EGD); Surgeon: Sharifa Saenz MD;  Location: [facility];   Service:     LITHOTRIPSY      [place] ESOPHAGOGASTRODUODENOSCOPY TRANSORAL DIAGNOSTIC N/A 2018    Procedure: ESOPHAGOGASTRODUODENOSCOPY (EGD); Surgeon: Brianne Goldsmith MD;  Location: [facility]; Service: Gastroenterology    TONSILLECTOMY      VEIN LIGATION AND STRIPPING Bilateral     1980's        Pain Levels: FLACC 0    PLAN OF CARE START: 2222  PLAN OF CARE END: 22  FREQUENCY: 2x/wk    OBJECTIVE   9 HOLE PEG TEST: PLEASE COMPLETE NEXT SESSION    FUNCTIONAL DEXTERITY TESTS: PLEASE COMPLETE NEXT SESSION9     Victoria Cognitive Assessment Version 8 1 (MoCA V8 1)  Visuospatial/executive functionin/5  Namin/3  Memory: 1st trial:  3/5, 2nd trial:    Attention/concentration:   List of letters:   Seial Seven Subtraction:  1/3   Language/sentence repetition:    Language Fluency:  0  Abstract/Correlational Thinkin/2  Delayed Recall:  3/5  Orientation:                Memory Index Score: 12/15  MoCA V1 8 1 Raw Score:  , MIS:  12/15, indicative of MILD neurocognitive impairments  Primary deficits noted with executive functioning, visuospatial skills, and attn     Contextual Memory Test:   Immediate:    Delayed:     Trail Making Test A: 1:24 with 2 cues; norm for age/education:  41 74 seconds    Trail Making Test B: 3:23 with 2 cues, 2 errors, norm for age/education:  100 68   UE Strength:              KIMI: PLEASE COMPLETE NEXT SESSION     Physical Performance Test  Performed physical performance test with pt demonstrating 10/28; and indicating patient demonstrates impairments  7 point scale:  - < 19 4 mild  - 32-36 not frail        Range of Motion:  AROM:   BUE within normal limits     MMT: please complete next session    Handwriting sample scanned into EMR     Pt is R hand dominant       GOALS: (additional goals to be added next session after completing additional assessments, currently limited by time constraints)  Short Term Goals: 4 weeks   Pt will demonstrate improvement in overall functional cognition by scoring at least 21/30 on MoCA for carry over with IADL participation  Pt will demonstrate improvement in immediate and delayed visual memory by recalling at least 9,8 items on CMT respectively  Pt will demonstrate improved sustained attn by completing Trail Making Test A within 1:15 for carry over with functional activity participation    Pt will demonstrate improved divided attn by completing Trail Making Test B within 3 min and with no more than 2 cues  Long Term Goals 8-12 weeks   Pt will demonstrate improvement in overall functional cognition by scoring at least 213/30 on MoCA for carry over with IADL participation  Pt will demonstrate improvement in immediate and delayed visual memory by recalling at least 11,10 items on CMT respectively  Pt will demonstrate improved sustained attn by completing Trail Making Test A within 1:05 for carry over with functional activity participation    Pt will demonstrate improved divided attn by completing Trail Making Test B within 2:30 min and with no more than 2 cues      Precautions: FALL SAFETY, cognitive deficits    Eval/ Re-eval POC expires Funmi Hancock #/ Referral # Total units  Start date  Expiration date Extension                                                             Date               Units:  Used               Authed:  Remaining

## 2022-02-22 NOTE — PROGRESS NOTES
Daily Note   RE 22  POC 3/30/22    Insurance:  AMA/CMS Eval/ Re-eval POC expires Billna Said #/ Referral # Total units  Start date  Expiration date Extension  Visit limitation? PT only or  PT+OT? Co-Insurance   CMS 1/5/ 22 3-30-22  Not required NA NA NA NA BOMN PT Yes, $0 copay                                                                 Today's date: 2022  Patient name: Sergio Silver  : 1945  MRN: [id number]  Referring provider: Marcus Martinez MD  Dx:   Encounter Diagnosis     ICD-10-CM    1  Parkinson's disease ([facility] )  G20                   Subjective: Patient arrives with portable O2 at 3L and SPC  Reports he spoke to neurologist who doubled his dose of medication for lightheadedness  Per chart review is taking midodrine now but reports minimal improvements      Objective: See treatment diary below    GAIT BELT @ START OF TREATMENT    Vitals  - SPO2: 97% on 3L of O2    NMR   1  Large amplitude reaching from floor to ceiling - 10 reps  2  Large amplitude reaching from side to side - 10 reps B/L  3  Standing large amplitude forward step  - 10 reps bilaterally w/ bar to stabilize laterally   4  Standing uastin amplitude side step - 10 reps bilaterally w/ bar to stabilize anteriorly   5  Sit to stand- 5 reps, 2 sets, 1 airex on chair    TE:   - 10 minutes total on NuStep, 2 min @ level 1, 8 min @ level 4; HR- 79-93 bpm, SpO2- 95-97%  - Ambulation to/from gym, (75' x 2) x 2       Assessment: Patient tolerated session well  Patient demonstrates improvement with reaching and stepping during exercises when having visual target to hit, utilized color dots with improvement  Plan to add karen with stepping exercises to improve foot clearance and further challenge balance  Patient able to ambulate from lobby to treatment gym without rest break using SPC, reporting improvement with his endurance   Patient will benefit from continued skilled therapy to improve endurance, strength, and balance and normalize gait for maximal daily function at home and in community  Plan: Continue per plan of care  Outcome Measures Initial Eval  1-5-22 Daily Note PN  2/8/22      5xSTS 16 87 sec, from 1 foam pad 2 UE asssit  16 27 sec from 1 foam pad 2 UE asssit      TUG  - Regular  - Cognitive  - Carry   18 92 sec  21 00 sec  Defer    - 15 64 sec w/ SPC  - 16 53 sec w/ SPC  - 16 69 sec w/ SPC      MALDONADO Defer/56  22/56      miniBEST Defer/28  defer      10 meter Defer 0 54 m/s 0 43 m/s      2MWT Defer ft 150 ft Performed 3 MWT      ABC Defer% 71 13% 76 3%      3 MWT   220 ft                    Precautions: Requires O2 via nasal canula, fall risk  Past Medical History:   Diagnosis Date    Anesthesia complication     Difficult to wake up    Arthritis     BPH (benign prostatic hyperplasia)     urinary frequency    Cancer (HCC)     basal cell neck, face    COPD (chronic obstructive pulmonary disease) (HCC)     COPD exacerbation ([facility] [address] ) 12/29/2019    Full dentures     Hiatal hernia     History of methicillin resistant staphylococcus aureus (MRSA)     10/11/2018 MRSA (nares) positive    Hypertension     Irritable bowel syndrome     Kidney stone     at least 7 episodes    Liver disease     Alpha 1- enzyme deficiency - diagnosed 2002   has been on weekly replacement therapy since then    Pulmonary emphysema ([facility] )     1/25/15  FEV1 - 2 45 liters or 59% of predicted    RSV infection 12/2017    Wears glasses     for driving only

## 2022-02-24 ENCOUNTER — OFFICE VISIT (OUTPATIENT)
Dept: PHYSICAL THERAPY | Facility: CLINIC | Age: 77
End: 2022-02-24
Payer: MEDICARE

## 2022-02-24 ENCOUNTER — OFFICE VISIT (OUTPATIENT)
Dept: OCCUPATIONAL THERAPY | Facility: CLINIC | Age: 77
End: 2022-02-24
Payer: MEDICARE

## 2022-02-24 DIAGNOSIS — G20 PARKINSON'S DISEASE (HCC): Primary | ICD-10-CM

## 2022-02-24 DIAGNOSIS — G20 PARKINSON DISEASE (HCC): Primary | ICD-10-CM

## 2022-02-24 PROCEDURE — 97110 THERAPEUTIC EXERCISES: CPT

## 2022-02-24 PROCEDURE — 97112 NEUROMUSCULAR REEDUCATION: CPT

## 2022-02-24 PROCEDURE — 97112 NEUROMUSCULAR REEDUCATION: CPT | Performed by: OCCUPATIONAL THERAPIST

## 2022-02-24 PROCEDURE — 97530 THERAPEUTIC ACTIVITIES: CPT | Performed by: OCCUPATIONAL THERAPIST

## 2022-02-24 NOTE — PROGRESS NOTES
Daily Note   RE 22  POC 3/30/22    Insurance:  AMA/CMS Eval/ Re-eval POC expires Hedy Daughters #/ Referral # Total units  Start date  Expiration date Extension  Visit limitation? PT only or  PT+OT? Co-Insurance   CMS 1/5/ 22 3-30-22  Not required NA NA NA NA BOMN PT Yes, $0 copay                                                                 Today's date: 2022  Patient name: Valorie Buchanan  : 1945  MRN: [id number]  Referring provider: Raya Blake MD  Dx:   Encounter Diagnosis     ICD-10-CM    1  Parkinson's disease ([facility] )  G20                   Subjective: Patient arrives with portable O2 at 3L and SPC  Reports overall no change in lightheadedness  Objective: See treatment diary below    GAIT BELT @ START OF TREATMENT    Vitals  - SPO2: 98% on 3L of O2    NMR   1  Large amplitude reaching from floor to ceiling - 10 reps  2  Large amplitude reaching from side to side - 10 reps B/L  3  Standing large amplitude forward step  - 10 reps bilaterally w/ bar to stabilize laterally   4  Standing austin amplitude side step - 10 reps bilaterally w/ bar to stabilize anteriorly   5  Sit to stand- 5 reps, 2 sets, 1 airex on chair    TE:   - 10 minutes total on NuStep, 3 min @ level 2, 7 min @ level 4; HR- 81-83 bpm, SpO2- 92-98%  - Ambulation to/from gym, 150' x 1, (75' x 2) x 1       Assessment: Patient tolerated session well today  Patient continues to display difficulty with large amplitude reaching side to side secondary to decreased trunk rotation  Patient displayed mild improvement in foot clearance with stepping exercises today; may consider adding hurdles next session for further challenge and visual cue  Patient continues to display improvements in endurance as displayed by being able to ambulate from lobby to gym without requiring rest breaks   Patient will benefit from continued skilled therapy to improve endurance, strength, and balance and normalize gait for maximal daily function at home and in community  Plan: Continue per plan of care  Outcome Measures Initial Eval  1-5-22 Daily Note PN  2/8/22      5xSTS 16 87 sec, from 1 foam pad 2 UE asssit  16 27 sec from 1 foam pad 2 UE asssit      TUG  - Regular  - Cognitive  - Carry   18 92 sec  21 00 sec  Defer    - 15 64 sec w/ SPC  - 16 53 sec w/ SPC  - 16 69 sec w/ SPC      MALDONADO Defer/56  22/56      miniBEST Defer/28  defer      10 meter Defer 0 54 m/s 0 43 m/s      2MWT Defer ft 150 ft Performed 3 MWT      ABC Defer% 71 13% 76 3%      3 MWT   220 ft                    Precautions: Requires O2 via nasal canula, fall risk  Past Medical History:   Diagnosis Date    Anesthesia complication     Difficult to wake up    Arthritis     BPH (benign prostatic hyperplasia)     urinary frequency    Cancer (HCC)     basal cell neck, face    COPD (chronic obstructive pulmonary disease) (HCC)     COPD exacerbation ([facility] ) 12/29/2019    Full dentures     Hiatal hernia     History of methicillin resistant staphylococcus aureus (MRSA)     10/11/2018 MRSA (nares) positive    Hypertension     Irritable bowel syndrome     Kidney stone     at least 7 episodes    Liver disease     Alpha 1- enzyme deficiency - diagnosed 2002   has been on weekly replacement therapy since then    Pulmonary emphysema ([facility] )     1/25/15  FEV1 - 2 45 liters or 59% of predicted    RSV infection 12/2017    Wears glasses     for driving only

## 2022-02-24 NOTE — PROGRESS NOTES
Daily Note     Today's date: 2022  Patient name: Crissy Moore  : 1945  MRN: [id number]  Referring provider: Pablito Edmondson MD  Dx:   Encounter Diagnosis   Name Primary?  Parkinson disease ([facility] [address] ) Yes       Start Time: 1104  Stop Time: 1145  Total time in clinic (min): 41 minutes    Precautions: walk to front  Visit 2 of 10   PN due 3/22/22  POC valid 22    Subjective: "My memory isn't what it used to be "      Objective: See treatment below  9 HOLE PEG TEST: performed 9 hole peg test to assess dexterity/fine motor coordination   R: 36 0 seconds  L: 41 3 seconds  Pt demonstrates decreased [address] compared to norms for age/sex (~26 seconds)    Functional Dexterity Test:  R: 40 8 seconds, 1 drop  L: 45 7 seconds  Pt demonstrates decreased B dexterity compared to average scores for his age/sex (31 seconds dominant hand, 38 seconds non-dominant hand)    KIMI  R: 61 3 lbs  L: 45 3 lbs    IQ puzzler setting up according to prompt x2 rounds focusing on FMC/dexterity and  skills  Required modA/cues for  portion of task and noted with frequently bumping other pieces  Divided attention/naming worksheet completed to address cognition  Required extra cues for direction following initially  Provided to complete for HEP  Assessment: Tolerated treatment well  Pt demonstrating decreased coordination, dexterity, hand strength and difficulty with divided attention,   Pt would benefit from continued OT services to address cognitive and physical function  Plan: Continued skilled OT per POC

## 2022-02-28 DIAGNOSIS — G62.9 NEUROPATHY: ICD-10-CM

## 2022-02-28 RX ORDER — GABAPENTIN 300 MG/1
CAPSULE ORAL
Qty: 90 CAPSULE | Refills: 0 | Status: SHIPPED | OUTPATIENT
Start: 2022-02-28 | End: 2022-03-28

## 2022-03-01 ENCOUNTER — OFFICE VISIT (OUTPATIENT)
Dept: OCCUPATIONAL THERAPY | Facility: CLINIC | Age: 77
End: 2022-03-01
Payer: MEDICARE

## 2022-03-01 ENCOUNTER — OFFICE VISIT (OUTPATIENT)
Dept: PHYSICAL THERAPY | Facility: CLINIC | Age: 77
End: 2022-03-01
Payer: MEDICARE

## 2022-03-01 DIAGNOSIS — G20 PARKINSON'S DISEASE (HCC): Primary | ICD-10-CM

## 2022-03-01 DIAGNOSIS — G20 PARKINSON DISEASE (HCC): Primary | ICD-10-CM

## 2022-03-01 PROCEDURE — 97112 NEUROMUSCULAR REEDUCATION: CPT

## 2022-03-01 PROCEDURE — 97530 THERAPEUTIC ACTIVITIES: CPT

## 2022-03-01 NOTE — PROGRESS NOTES
Daily Note     Today's date: 3/1/2022  Patient name: Mario Alberto Lockett  : 1945  MRN: [id number]  Referring provider: Jeffry Garcia MD  Dx:   Encounter Diagnosis   Name Primary?  Parkinson disease ([facility] [address] ) Yes                  Precautions: walk to front  Visit 2 of 10   PN due 3/22/22  POC valid 22    Subjective: "I am pretty good at home "    Objective: See treatment below  Educated on Compensatory Memory Strategies    Memory #2  Immediate Recall:   Delayed Recall:   Compensatory Stategy: Chunking    Visual Perception, Fine Motor Coordination,   Tangram Blocks   3 Cards completed independently   Difficulty with Orientation of blocks   Difficulty with coordination of tiles     Attention  Spot it   Min vc to identify items     Assessment: Tolerated treatment well  Pt demonstrated decreased memory recall abilities today, however, pt was able to apply compensatory memory strategies  Difficulty with orientation of tangram tiles  Decreased fine motor abilities with L when compared to R  Able to maintain attention to treatment session today  Will continue to progress pt as tolerated  Plan: Continued skilled OT per POC

## 2022-03-01 NOTE — PROGRESS NOTES
Daily Note   RE 22  POC 3/30/22    Insurance:  AMA/CMS Eval/ Re-eval POC expires Funmi Hancock #/ Referral # Total units  Start date  Expiration date Extension  Visit limitation? PT only or  PT+OT? Co-Insurance   CMS 1/5/ 22 3-30-22  Not required NA NA NA NA BOMN PT Yes, $0 copay                                                                 Today's date: 3/1/2022  Patient name: Kathia Sweet  : 1945  MRN: [id number]  Referring provider: Veronica Prince MD  Dx:   Encounter Diagnosis     ICD-10-CM    1  Parkinson's disease ([facility] [address] )  G20                   Subjective: Patient arrives with portable O2 at 3L and SPC  Reports he as increased pain in his (R) chest that he has experienced before  Denies any SOB outside of norm, denies crushing pressure in chest, describes pain as muscular  Objective: See treatment diary below    GAIT BELT @ START OF TREATMENT    Vitals  - SPO2: 96% on 3L of O2    NMR   1  Large amplitude reaching from floor to ceiling - 10 reps  2  Large amplitude reaching from side to side - 10 reps B/L  3  Standing large amplitude forward step  - 10 reps bilaterally w/ bar to stabilize laterally   4  Standing austin amplitude side step - 10 reps bilaterally w/ bar to stabilize anteriorly   5  Sit to stand- 5 reps, 2 sets, 1 airex on chair    TE:   - 10 minutes total on NuStep, 3 min @ level 2, 7 min @ level 4; HR- 84 bpm, SpO2- 96%  - Ambulation to/from gym, 150' x 1    Assessment: Patient tolerated session well today  Patient limited in large amplitude movements today secondary to increased chest/rib pain with rotational movements and bending  SpO2 noted to be stable and within normal limits throughout sessions  Handoff to OT Perlita at end of session  Patient will benefit from continued skilled therapy to improve endurance, strength, and balance and normalize gait for maximal daily function at home and in community  Plan: Continue per plan of care           Outcome Measures Initial Pazal  1-5-22 Daily Note PN  2/8/22      5xSTS 16 87 sec, from 1 foam pad 2 UE asssit  16 27 sec from 1 foam pad 2 UE asssit      TUG  - Regular  - Cognitive  - Carry   18 92 sec  21 00 sec  Defer    - 15 64 sec w/ SPC  - 16 53 sec w/ SPC  - 16 69 sec w/ SPC      MALDONADO Defer/56  22/56      miniBEST Defer/28  defer      10 meter Defer 0 54 m/s 0 43 m/s      2MWT Defer ft 150 ft Performed 3 MWT      ABC Defer% 71 13% 76 3%      3 MWT   220 ft                    Precautions: Requires O2 via nasal canula, fall risk  Past Medical History:   Diagnosis Date    Anesthesia complication     Difficult to wake up    Arthritis     BPH (benign prostatic hyperplasia)     urinary frequency    Cancer (HCC)     basal cell neck, face    COPD (chronic obstructive pulmonary disease) (HCC)     COPD exacerbation ([facility] [address] ) 12/29/2019    Full dentures     Hiatal hernia     History of methicillin resistant staphylococcus aureus (MRSA)     10/11/2018 MRSA (nares) positive    Hypertension     Irritable bowel syndrome     Kidney stone     at least 7 episodes    Liver disease     Alpha 1- enzyme deficiency - diagnosed 2002   has been on weekly replacement therapy since then    Pulmonary emphysema ([facility] [facility] )     1/25/15  FEV1 - 2 45 liters or 59% of predicted    RSV infection 12/2017    Wears glasses     for driving only

## 2022-03-03 ENCOUNTER — OFFICE VISIT (OUTPATIENT)
Dept: PHYSICAL THERAPY | Facility: CLINIC | Age: 77
End: 2022-03-03
Payer: MEDICARE

## 2022-03-03 ENCOUNTER — OFFICE VISIT (OUTPATIENT)
Dept: OCCUPATIONAL THERAPY | Facility: CLINIC | Age: 77
End: 2022-03-03
Payer: MEDICARE

## 2022-03-03 DIAGNOSIS — G20 PARKINSON DISEASE (HCC): Primary | ICD-10-CM

## 2022-03-03 DIAGNOSIS — G20 PARKINSON'S DISEASE (HCC): Primary | ICD-10-CM

## 2022-03-03 PROCEDURE — 97112 NEUROMUSCULAR REEDUCATION: CPT

## 2022-03-03 PROCEDURE — 97530 THERAPEUTIC ACTIVITIES: CPT

## 2022-03-03 NOTE — PROGRESS NOTES
Daily Note     Today's date: 3/3/2022  Patient name: Enio Richards  : 1945  MRN: [id number]  Referring provider: Jimbo Blackmon MD  Dx:   Encounter Diagnosis   Name Primary?  Parkinson disease ([facility] [address] ) Yes                  Precautions: walk to front  Visit 4 of 10   PN due 3/22/22  POC valid 22    Subjective: pt was 5 minutes late for session    Objective: See treatment below  Performed attention task of multimatrix with simple shapes - pt required -mod to min VCs for problem solving shapes however with repetition pt completed with min - focusing on [address], sustained atteniton and memory    Educated and provided with HEP for theraputty and provided with yellow theraputty  Pt performed each exercise with fair carryover and feels confident with newly learned HEP  Completed x 5 reps of each for carryover with min VCs overall      Assessment: Tolerated treatment well  Pt demonstrated decreased  skills and trialed with figure ground card with increased difficulty and able to maintain attention to treatment session today  Will continue to progress pt as tolerated  Plan: Continued skilled OT per POC

## 2022-03-03 NOTE — PROGRESS NOTES
Daily Note   RE 22  POC 3/30/22    Insurance:  AMA/CMS Eval/ Re-eval POC expires Adelina Boast #/ Referral # Total units  Start date  Expiration date Extension  Visit limitation? PT only or  PT+OT? Co-Insurance   CMS 1/5/ 22 3-30-22  Not required NA NA NA NA BOMN PT Yes, $0 copay                                                                 Today's date: 3/3/2022  Patient name: Madai Travis  : 1945  MRN: [id number]  Referring provider: Carrington Madera MD  Dx:   Encounter Diagnosis     ICD-10-CM    1  Parkinson's disease ([facility] [address] )  G20        Start Time: 1100  Stop Time: 1145  Total time in clinic (min): 45 minutes    Subjective: Patient arrives with portable O2 at 3L and SPC; no new issues or complaints  Objective: See treatment diary below    GAIT BELT @ START OF TREATMENT    Vitals  - SPO2: 96% on 3L of O2    NMR   1  Large amplitude reaching from floor to ceiling - 10 reps  2  Large amplitude reaching from side to side - 10 reps B/L  3  Standing large amplitude forward step  - 10 reps bilaterally w/ bar to stabilize laterally   4  Standing austin amplitude side step - 10 reps bilaterally w/ bar to stabilize anteriorly   5  Sit to stand x 10; 1 airex on chair    TE:   - 10 minutes total on NuStep, 3 min @ level 2, 7 min @ level 4; HR- 84 bpm, SpO2- 96%  - Ambulation to/from gym, 150' x 2    Assessment: Patient tolerated session well today with continued focus on large amplitude movements  Requires verbal and tactile cueing to perform all steps of large amplitude exercises, including big fingers and hands  Demonstrated increased fatigue with NuStep today at approximately 4-5 minutes, as evidenced by decreased rate of pedaling  Patient will benefit from continued skilled therapy to improve endurance, strength, and balance and normalize gait for maximal daily function at home and in community  Plan: Continue per plan of care           Outcome Measures Initial Eval  22 Daily Note PN  22 5xSTS 16 87 sec, from 1 foam pad 2 UE asssit  16 27 sec from 1 foam pad 2 UE asssit      TUG  - Regular  - Cognitive  - Carry   18 92 sec  21 00 sec  Defer    - 15 64 sec w/ SPC  - 16 53 sec w/ SPC  - 16 69 sec w/ SPC      MALDONADO Defer/56  22/56      miniBEST Defer/28  defer      10 meter Defer 0 54 m/s 0 43 m/s      2MWT Defer ft 150 ft Performed 3 MWT      ABC Defer% 71 13% 76 3%      3 MWT   220 ft                    Precautions: Requires O2 via nasal canula, fall risk  Past Medical History:   Diagnosis Date    Anesthesia complication     Difficult to wake up    Arthritis     BPH (benign prostatic hyperplasia)     urinary frequency    Cancer (HCC)     basal cell neck, face    COPD (chronic obstructive pulmonary disease) (HCC)     COPD exacerbation (HCC) 12/29/2019    Full dentures     Hiatal hernia     History of methicillin resistant staphylococcus aureus (MRSA)     10/11/2018 MRSA (nares) positive    Hypertension     Irritable bowel syndrome     Kidney stone     at least 7 episodes    Liver disease     Alpha 1- enzyme deficiency - diagnosed 2002   has been on weekly replacement therapy since then    Pulmonary emphysema ([address] )     1/25/15  FEV1 - 2 45 liters or 59% of predicted    RSV infection 12/2017    Wears glasses     for driving only

## 2022-03-08 ENCOUNTER — OFFICE VISIT (OUTPATIENT)
Dept: OCCUPATIONAL THERAPY | Facility: CLINIC | Age: 77
End: 2022-03-08
Payer: MEDICARE

## 2022-03-08 ENCOUNTER — OFFICE VISIT (OUTPATIENT)
Dept: PHYSICAL THERAPY | Facility: CLINIC | Age: 77
End: 2022-03-08
Payer: MEDICARE

## 2022-03-08 DIAGNOSIS — G20 PARKINSON DISEASE (HCC): Primary | ICD-10-CM

## 2022-03-08 DIAGNOSIS — G20 PARKINSON'S DISEASE (HCC): Primary | ICD-10-CM

## 2022-03-08 PROCEDURE — 97112 NEUROMUSCULAR REEDUCATION: CPT | Performed by: PHYSICAL THERAPIST

## 2022-03-08 PROCEDURE — 97530 THERAPEUTIC ACTIVITIES: CPT | Performed by: OCCUPATIONAL THERAPIST

## 2022-03-08 NOTE — PROGRESS NOTES
Daily Note   RE 22  POC 3/30/22    Insurance:  AMA/CMS Eval/ Re-eval POC expires Rodrick Favor #/ Referral # Total units  Start date  Expiration date Extension  Visit limitation? PT only or  PT+OT? Co-Insurance   CMS 1/5/ 22 3-30-22  Not required NA NA NA NA BOMN PT Yes, $0 copay                                                                 Today's date: 3/8/2022  Patient name: Marely Schmidt  : 1945  MRN: [id number]  Referring provider: Annette Marie MD  Dx:   Encounter Diagnosis     ICD-10-CM    1  Parkinson's disease ([facility] [address] )  G20                   Subjective: Patient arrives with portable O2 at 3L and SPC; no new issues or complaints  Objective: See treatment diary below    GAIT BELT @ START OF TREATMENT    Vitals  - SPO2: 96% on 3L of O2    NMR   1  Large amplitude reaching from floor to ceiling - 10 reps  2  Large amplitude reaching from side to side - 10 reps B/L  3  Standing large amplitude forward step  - 10 reps bilaterally w/ gait belt and CGA  4  Standing austin amplitude side step - 10 reps bilaterally w/ gait belt and CGA  5  Sit to stand x 10; 1 airex on chair  - Ambulation to/from gym, 150' x 2      Assessment: Patient tolerated treatment well  He demonstrated improvement with his exercise recall, remembering first 2 exercises and needing only minor visual cueing to increase amplitude of movements  SpO2 monitored throughout treatment, ranging from 90-96%  Gait belt and CGA maintained for all standing exercises to prevent LOB  Patient will benefit from continued skilled therapy to improve endurance, strength, and balance and normalize gait for maximal daily function at home and in community  Plan: Continue per plan of care           Outcome Measures Initial Eval  22 Daily Note PN  22      5xSTS 16 87 sec, from 1 foam pad 2 UE asssit  16 27 sec from 1 foam pad 2 UE asssit      TUG  - Regular  - Cognitive  - Carry   18 92 sec  21 00 sec  Defer    - 15 64 sec w/ SPC  - 16 53 sec w/ SPC  - 16 69 sec w/ SPC      MALDONADO Defer/56  22/56      miniBEST Defer/28  defer      10 meter Defer 0 54 m/s 0 43 m/s      2MWT Defer ft 150 ft Performed 3 MWT      ABC Defer% 71 13% 76 3%      3 MWT   220 ft                    Precautions: Requires O2 via nasal canula, fall risk  Past Medical History:   Diagnosis Date    Anesthesia complication     Difficult to wake up    Arthritis     BPH (benign prostatic hyperplasia)     urinary frequency    Cancer (HCC)     basal cell neck, face    COPD (chronic obstructive pulmonary disease) (HCC)     COPD exacerbation ([facility] ) 12/29/2019    Full dentures     Hiatal hernia     History of methicillin resistant staphylococcus aureus (MRSA)     10/11/2018 MRSA (nares) positive    Hypertension     Irritable bowel syndrome     Kidney stone     at least 7 episodes    Liver disease     Alpha 1- enzyme deficiency - diagnosed 2002   has been on weekly replacement therapy since then    Pulmonary emphysema ([facility] )     1/25/15  FEV1 - 2 45 liters or 59% of predicted    RSV infection 12/2017    Wears glasses     for driving only

## 2022-03-08 NOTE — PROGRESS NOTES
Daily Note     Today's date: 3/8/2022  Patient name: Javi Betts  : 1945  MRN: [id number]  Referring provider: Jacinta Babin MD  Dx:   Encounter Diagnosis   Name Primary?  Parkinson disease ([facility] [address] ) Yes       Start Time: 1019  Stop Time: 1100  Total time in clinic (min): 41 minutes    Precautions: walk to front  Visit 4 of 10   PN due 3/22/22  POC valid 22    Subjective: "Everybody's trying to get me off the road " (discussed recommendation for completing FTD)    Objective: See treatment below  -IQ cars x4 rounds (levels 1-4) focusing on EF  Required mod-max cues throughout for direction following and problem solving, and modA for setting up cars appropriately d/t  deficits  -Beverage crossword puzzle completed to address problem solving, attention  Requires modA to complete  -Multimatrix letter/number task focusing on divided attention and working memory  Then 3 word naming for each letter and pt required 1 cue to think of a word starting with D  Completed A-E  Assessment: Tolerated treatment well  Pt demonstrated decreased  skills and trialed with figure ground card with increased difficulty and able to maintain attention to treatment session today  Will continue to progress pt as tolerated  Plan: Continued skilled OT per POC

## 2022-03-10 ENCOUNTER — OFFICE VISIT (OUTPATIENT)
Dept: OCCUPATIONAL THERAPY | Facility: CLINIC | Age: 77
End: 2022-03-10
Payer: MEDICARE

## 2022-03-10 ENCOUNTER — OFFICE VISIT (OUTPATIENT)
Dept: PHYSICAL THERAPY | Facility: CLINIC | Age: 77
End: 2022-03-10
Payer: MEDICARE

## 2022-03-10 DIAGNOSIS — G20 PARKINSON DISEASE (HCC): Primary | ICD-10-CM

## 2022-03-10 DIAGNOSIS — G20 PARKINSON'S DISEASE (HCC): Primary | ICD-10-CM

## 2022-03-10 PROCEDURE — 97530 THERAPEUTIC ACTIVITIES: CPT

## 2022-03-10 PROCEDURE — 97112 NEUROMUSCULAR REEDUCATION: CPT

## 2022-03-10 NOTE — PROGRESS NOTES
Daily Note     Today's date: 3/10/2022  Patient name: Yun Gonzalez  : 1945  MRN: [id number]  Referring provider: Yash Boss MD  Dx:   Encounter Diagnosis   Name Primary?  Parkinson disease ([facility] [address] ) Yes       Start Time: 6240  Stop Time: 1230  Total time in clinic (min): 45 minutes    Precautions: walk to front  Visit 4 of 10   PN due 3/22/22  POC valid 22    Subjective: "They put my infusion in too fast so I got a bad HA and didn't sleep last night"    Objective: See treatment below  -Visual logic, letter logic worksheet with focus on following simple directions with 1-2 components  Requires assist with ~30% of items 2* poor direction following  Requires significantly inc time for processing    -120 number board with serial 5 subtraction and providing name/animal based on whether number is odd or even  Initially requires mod cues for recall, but then able to complete with inc time for processing     -Following directions with spatial awareness worksheet with focus on following simple 1-2 step directions  Completes with inc time for processing, no cues  -Pipetree puzzle  activity with focus on bimanual coordination, , and problem solving  1 error and unable to ID with cue  Once error is identified by therapist, pt is able to correct it    Assessment: Tolerated treatment well  Pt demonstrated decreased  skills and trialed with figure ground card with increased difficulty and able to maintain attention to treatment session today  Will continue to progress pt as tolerated  Plan: Continued skilled OT per POC

## 2022-03-10 NOTE — PROGRESS NOTES
Daily Note   RE 22  POC 3/30/22    Insurance:  AMA/CMS Eval/ Re-eval POC expires Al Rogue #/ Referral # Total units  Start date  Expiration date Extension  Visit limitation? PT only or  PT+OT? Co-Insurance   CMS 1/5/ 22 3-30-22  Not required NA NA NA NA BOMN PT Yes, $0 copay                                                                 Today's date: 3/10/2022  Patient name: Eric Frank  : 1945  MRN: [id number]  Referring provider: Abeba Goldsmith MD  Dx:   Encounter Diagnosis     ICD-10-CM    1  Parkinson's disease ([address] )  G20                   Subjective: Patient arrives with portable O2 at 3L and SPC; no new issues or complaints  Objective: See treatment diary below    GAIT BELT @ START OF TREATMENT    Vitals  - SPO2: 95% on 3L of O2    NMR   1  Large amplitude reaching from floor to ceiling - 10 reps  2  Large amplitude reaching from side to side - 10 reps B/L  3  Standing large amplitude forward step  - 10 reps bilaterally w/ gait belt and CGA  4  Standing austin amplitude side step - 10 reps bilaterally w/ gait belt and CGA  5  Standing large amplitude backwards step - 10 reps bilaterally w/ gait belt and CGA  5  Sit to stand x 10; 1 airex on chair  - Ambulation to gym, 150'     TE:   - NuStep, level 2, 10 minutes      Assessment: Patient tolerated treatment well today  Noted improvement in amplitude of stepping in all directions with good stability throughout  Patient was able to appropriately elicit stepping strategy when required one time  Patient also demonstrated good endurance throughout session  Gait belt and CGA maintained for all standing exercises to prevent LOB  Patient will benefit from continued skilled therapy to improve endurance, strength, and balance and normalize gait for maximal daily function at home and in community  Plan: Continue per plan of care           Outcome Measures Initial Eval  22 Daily Note PN  22      5xSTS 16 87 sec, from 1 foam pad 2 UE asssit  16 27 sec from 1 foam pad 2 UE asssit      TUG  - Regular  - Cognitive  - Carry   18 92 sec  21 00 sec  Defer    - 15 64 sec w/ SPC  - 16 53 sec w/ SPC  - 16 69 sec w/ SPC      MALDONADO Defer/56 22/56      miniBEST Defer/28  defer      10 meter Defer 0 54 m/s 0 43 m/s      2MWT Defer ft 150 ft Performed 3 MWT      ABC Defer% 71 13% 76 3%      3 MWT   220 ft                    Precautions: Requires O2 via nasal canula, fall risk  Past Medical History:   Diagnosis Date    Anesthesia complication     Difficult to wake up    Arthritis     BPH (benign prostatic hyperplasia)     urinary frequency    Cancer (HCC)     basal cell neck, face    COPD (chronic obstructive pulmonary disease) (HCC)     COPD exacerbation ([facility] [address] ) 12/29/2019    Full dentures     Hiatal hernia     History of methicillin resistant staphylococcus aureus (MRSA)     10/11/2018 MRSA (nares) positive    Hypertension     Irritable bowel syndrome     Kidney stone     at least 7 episodes    Liver disease     Alpha 1- enzyme deficiency - diagnosed 2002   has been on weekly replacement therapy since then    Pulmonary emphysema ([facility] [address] )     1/25/15  FEV1 - 2 45 liters or 59% of predicted    RSV infection 12/2017    Wears glasses     for driving only

## 2022-03-15 ENCOUNTER — APPOINTMENT (OUTPATIENT)
Dept: PHYSICAL THERAPY | Facility: CLINIC | Age: 77
End: 2022-03-15
Payer: MEDICARE

## 2022-03-15 ENCOUNTER — APPOINTMENT (OUTPATIENT)
Dept: OCCUPATIONAL THERAPY | Facility: CLINIC | Age: 77
End: 2022-03-15
Payer: MEDICARE

## 2022-03-17 ENCOUNTER — PATIENT OUTREACH (OUTPATIENT)
Dept: FAMILY MEDICINE CLINIC | Facility: CLINIC | Age: 77
End: 2022-03-17

## 2022-03-17 ENCOUNTER — TELEPHONE (OUTPATIENT)
Dept: OCCUPATIONAL THERAPY | Facility: CLINIC | Age: 77
End: 2022-03-17

## 2022-03-17 ENCOUNTER — OFFICE VISIT (OUTPATIENT)
Dept: OCCUPATIONAL THERAPY | Facility: CLINIC | Age: 77
End: 2022-03-17
Payer: MEDICARE

## 2022-03-17 ENCOUNTER — EVALUATION (OUTPATIENT)
Dept: PHYSICAL THERAPY | Facility: CLINIC | Age: 77
End: 2022-03-17
Payer: MEDICARE

## 2022-03-17 DIAGNOSIS — G20 PARKINSON DISEASE (HCC): Primary | ICD-10-CM

## 2022-03-17 DIAGNOSIS — G20 PARKINSON'S DISEASE (HCC): Primary | ICD-10-CM

## 2022-03-17 PROCEDURE — 97530 THERAPEUTIC ACTIVITIES: CPT

## 2022-03-17 PROCEDURE — 97164 PT RE-EVAL EST PLAN CARE: CPT

## 2022-03-17 PROCEDURE — 97112 NEUROMUSCULAR REEDUCATION: CPT

## 2022-03-17 NOTE — PROGRESS NOTES
[id number]             Insurance:  A/CMS Eval/ Re-eval POC expires Kjmario Davalos #/ Referral # Total units  Start date  Expiration date Extension  Visit limitation? PT only or  PT+OT? Co-Insurance   CMS 1/5/ 22 3-30-22  Not required NA NA NA NA BOMN PT Yes, $0 copay    3-22                                                                 Today's date: 3/17/2022  Patient name: Enio Richards  : 1945  MRN: [id number]  Referring provider: Jimbo Blackmon MD  Dx:   Encounter Diagnosis     ICD-10-CM    1  Parkinson's disease ([facility] [address] )  G20              Assessment  Assessment details: Patient is a 65 y o  Male who has been attending skilled PT since 2022 with recent diagnosis of PD  Patient demonstrates the following gait deficits : bradykinesia, shuffling gait, decreased reciprocal arm swing, absent trunk rotation, mild-moderate postural instability, and forward head and shoulder posture  Patient has demonstrated improvements in balance and endurance since prior PN  Patient demonstrated relative plateau with 5x STS and TUG, suggesting small changes in LE strength/power and functional mobility; however TUG has improved significantly since initial evaluation  Since last PN, patient demonstrated 10-point improvement in Carl to 32/56 which deems patient at HIGH risk of falls  Patient also significantly improved gait speed from 0 43 m/s to 0 88 m/s today which places patient in unlimited community ambulator category; however this speed is insufficient to safely cross a street  Patient also demonstrated increase in 3MWT from 220 feet previously to 475 feet today, suggesting improved cardiovascular endurance  Patient has met 1/4 short term goals at this time and is progressing toward long term goals  He presents with the following symptoms that are consistent with Alan and Yahr stage 3: mild to moderate postural instability and independent   Per research provided by [employer], patient will benefit from skilled outpatient PT services to improve and maximize his function, to reduce risk for falls and potential injuries associated with falls, reduce healthcare costs via hospitalization, and reduce patient and national healthcare costs  In early stages of Parkinson's Disease, research indicates that intensive physical exercise can improve patient's motor control and assist in slowing the disease progression as a neuroprotective agent  Patient will undoubtedly benefit from continuation of skilled OPPT services to improve balance, strength, and endurance, and reduce fall risk in order to promote maximal safety and function at home and in the community  Patient expressed thoughts of wanting to die following OT session in which he was suggested to take Fitness to Drive evaluation  Antelmo Suazo is outlined in Subjective section below  Patient denied active plan to hurt himself  Provided crisis hotlines sheet to patient; he stated he will call them if he needs someone to listen to him  Patient was not agitated at end of session and expressed that he realized that taking the evaluation was not a guarantee that he would lose his license  Impairments: Abnormal coordination, Abnormal gait, Abnormal or restricted ROM, Activity intolerance, Impaired balance, Impaired physical strength, Lacks appropriate HEP, Poor posture, Poor body mechanics, Safety issue and Abnormal movement  Understanding of Dx/Px/POC: Fair  Prognosis: Good      Patient verbalized understanding of POC  Please contact me if you have any questions or recommendations  Thank you for the referral and the opportunity to share in [employer] care             Plan  Program: [facility]  Patient would benefit from: Skilled PT, OT, Speech  Planned therapy interventions: Abdominal trunk stabilization, ADL training, Balance, Balance/WB training, Breathing training, Body mechanics training, Coordination, Functional ROM exercises, Gait training, HEP, Motor coordination training, Neuromuscular re-education, Patient education, Postural training, Strengthening, Stretching, Therapeutic activities, Therapeutic exercises and Transfer training  Frequency: 2-3x/wk   Duration in weeks: 12 weeks  Plan of Care beginning date: 3/17/22  Plan of Care expiration date: 12 weeks - 6/9/22  Treatment plan discussed with: Patient         Goals  Short Term Goals:  - Patient will improve TUG score by MDC of 4 8 sec from 18 92 sec to 14 12 sec in order to reduce risk of falls and to increase safety with functional mobility- NOT MET  - Patient will improve 5 STS by the [address] of 2 3 sec from 16 87 sec to 14 57 sec indicating an improvement in strength and decreased challenge with transfers- NOT MET  - Patient will perform 2 MWT to promote improvement in cardiovascular endurance in order to maximize function with functional mobility throughout the community- MET  - Patient will be independent in basic HEP in order to manage condition at home- Progressing    Long Term Goals:  - Patient will score a low risk for falls 2/4 fall risks measures - NOT MET  - Patient will score age norm values for 1/2 endurance measures - NOT MET  - Patient will be able to ambulate on uneven surfaces with 50% reduction in near falls to increase safety with community mobility - PROGRESSING  - Patient will be able to perform a floor transfer without physical assistance to assist with fall recovery at home and in the community - NOT [address] [facility] [address]  - Patient will be independent in comprehensive HEP post discharge from program - PROGRESSING  - Patient will be able to walk up incline with decreased difficulty and no loss of balance in order to improve functional mobility throughout the community - PROGRESSING  - Patient will be able to perform sit to stands from softer surfaces such as a couch or bed in order to improvement function with transfers - PROGRESSING         Cut off score   All date taken from [facility] Neuro Section or Rehab Measures      MALDONADO Cutoff Scores:  MDC: 5 pts  Falls Risk Cutoff: 40 22/56 DGI Cutoff Scores:  Josuha Arana al 2011, MDC: 2 9 pts  Raffi 2008, Arlene: Score <19/24   MiniBEST Cutoff Scores:  Renata Paulino al 2011, MDC: 5 52 pts  Kay Muhammad 2013, [facility]: <19/28 5xSTS Cutoff Scores:  MDC: 2 3 sec  Tamara Judge et al, 2011, Arlene: > 16 sec   TUG Cutoff Scores:  Fareed Aguilar, 2011, MDC: 4 8 sec  Pilar Spann et al, 2011, Fallers: Meds ON: < 12 21 sec, OFF: 15 5 sec 10 Meter Walk Test Cutoff Scores:  Pam Manuel, 2008, MDC: 0 18 m/s  Age Norm Values, Arlene: < 1 0 m/s   ABC Cutoff Scores:  Angelique Montilla, 2008, MDC: 13 pts  Fareed Ferreira, MDC: 11 12 pts  Increased risk for falls: < 69% 6 Minute Walk Test Cutoff Scores:  Pam Manuel, 2008, MDC: 269 ft  Jeet, 2002, Norm Data Healthy Adults:  61 - 71 years:   M: 200 m      F: 538 m  70 - 79 years:   M: 1 m      F: 200 m  80 - 89 years:   M: 1 m      F: 80 m   PDQ-39 Cutoff Scores:  Emilie monteiro, 2004:  MDC: Mobility (12 24), ADL (16 72), Emotional (14 22), Stigma (21 21), Social Support (21 25), Cognition (21 12), Communication (21 04), Bodily Discomfort (24 48)  Jorge et al, 2007:  Normative Data: Mobility (49 25), ADL (38 94), Emotional (37 92), Stigma (27 54), Social Support (14 78), Cognition (33 03), Communication (27 99), Bodily Discomfort (40 91) Alan and Yahr Stages  Stage 0: No S/S  Stage 1: Mild unilateral symptoms  Stage 1 5: Unilateral and axial symptoms  Stage 2: Bilateral symptoms, no balance impairment  Stage 2 5: Mild bilateral symptoms and recovery with pull test  Stage 3: Mild to moderate postural instability, independent  Stage 4: Severe disability, walking or standing unassisted  Stage 5: Bed bound, w/c bound           Subjective Evaluation    History of Present Illness  Mechanism of injury: PD dx 3 months ago, difficulty with balance  Primary AD: cane or walker  Previous Programs: Physical therapy for COPD, but not for PD    Update 3/17/22: Patient notices improvements in balance and endurance  He reports no recent falls  Patient states that during OT session he was suggested to take a Fitness to Drive evaluation and he is upset about it  He says "They might as well put a bullet in me  There would be no reason to live anymore if I can't drive  How would I be able to do anything?" Upon further questioning, patient states that he does not have an active plan to harm himself but he does not know how he will feel if he is not able to drive       Vitals  HR: 77 bpm   SpO2: 97%    Social Support  Steps to enter house: 2 step front entrance, 1 through garage  Stairs in house: no  Lives in: house  Lives with: alone    Employment status: retired  Hand dominance: right handed    Treatments  Previous treatment: PT for COPD, but not yet for PD dx      Objective   Gait abnormalities: slow gait speed, shuffling gait, decrease reciprocal arm swing, absent trunk rotation, forward head and shoulder posture      Outcome Measures Initial Eval  1-5-22 Daily Note PN  2/8/22 RE  3/17/22     5xSTS 16 87 sec, from 1 foam pad 2 UE asssit  16 27 sec from 1 foam pad 2 UE asssit 16 1 seconds from 1 foam pad 2 UE assist     TUG  - Regular  - Cognitive  - Carry   18 92 sec  21 00 sec  Defer    - 15 64 sec w/ SPC  - 16 53 sec w/ SPC  - 16 69 sec w/ SPC   - 15 52 sec w/ SPC   - 17 75 sec w/ SPC  - 17 54 sec w/ SPC      MALDONADO Defer/56  22/56 32/56     miniBEST Defer/28  defer defer     10 meter Defer 0 54 m/s 0 43 m/s 0 88 m/s     2MWT Defer ft 150 ft Performed 3 MWT Performed 3MWT     ABC Defer% 71 13% 76 3% 92 5%     3 MWT   220 ft 475 feet                   Precautions: Requires O2 via nasal canula, fall risk  Past Medical History:   Diagnosis Date    Anesthesia complication     Difficult to wake up    Arthritis     BPH (benign prostatic hyperplasia)     urinary frequency    Cancer (HCC)     basal cell neck, face    COPD (chronic obstructive pulmonary disease) (HCC)     COPD exacerbation ([facility] [address] ) 12/29/2019    Full dentures     Hiatal hernia     History of methicillin resistant staphylococcus aureus (MRSA)     10/11/2018 MRSA (nares) positive    Hypertension     Irritable bowel syndrome     Kidney stone     at least 7 episodes    Liver disease     Alpha 1- enzyme deficiency - diagnosed 2002   has been on weekly replacement therapy since then    Pulmonary emphysema ([facility] )     1/25/15  FEV1 - 2 45 liters or 59% of predicted    RSV infection 12/2017    Wears glasses     for driving only

## 2022-03-17 NOTE — PROGRESS NOTES
Daily Note     Today's date: 3/17/2022  Patient name: Sergio Silver  : 1945  MRN: [id number]  Referring provider: Marcus Martinez MD  Dx:   Encounter Diagnosis   Name Primary?  Parkinson disease ([facility] [address] ) Yes                  Precautions: walk to front  Visit 4 of 10   PN due 3/22/22  POC valid 22    Subjective: pt perseverated throughout session on driving and FTD- provided information and educated on scoring, purpose; after session, pt reports he would kill himself if he didn't pass assessment- PT notified this therapist PT Karl Gonzalez provded pt with suicide hotline, and this therapist notified PCP of  Suicidal however pt reports he does not have a plan; discussed FTD assessment and order was placed when talking with SW Fidelia Ebbing      Objective: See treatment below  Performed Q bitz task focusing on sustained attention,  skills, [address]  and EF skills- pt require VCs for problem solving overall- required increased time for processing overall, required cues for identifying errors  Completed the states sustained attention 1 step directions and pt will complete at home  Assessment: Tolerated treatment well  Pt demonstrated decreased  skills and required moderate VCs for problem solving  Session limited by pt persever   Will continue to progress pt as tolerated  Plan: Continued skilled OT per POC

## 2022-03-17 NOTE — TELEPHONE ENCOUNTER
Discussed and educated on FTD assessment with  morales and process  Educated that MD makes determination if pt could go back to driving   Recommending to f/u with sakina for further information

## 2022-03-17 NOTE — PROGRESS NOTES
Received phone call from Yakov Stephenson calling CM to discuss the following concerns: Pt scored a 19/30 on the [facility] cognitive test  Pt also made the statement that if he would rather die if he cannot drive  Pt denied any homicidal or suicidal ideation  Conference call to patients daughter Jayden Li  [facility] requesting that she return to Anya Stephenson to discuss further  Pt was taken to OT via [facility]  Pts ability to drive discussed  Pt has cognitive as well as physical decline  Pt has not had a fit to drive test done  Suggestions to have fit to drive test ordered, with results discussed with PCP  Note routed to Dr Jannet Schmidt and Dr Cindy Conteh

## 2022-03-17 NOTE — PROGRESS NOTES
He's not likely to get FTD done because he doesn't want to be told not to drive  We just put it in documentation and tell daughter that he can not drive due to very slow reaction time and some memory impairment  She will need to take keys away

## 2022-03-22 ENCOUNTER — OFFICE VISIT (OUTPATIENT)
Dept: OCCUPATIONAL THERAPY | Facility: CLINIC | Age: 77
End: 2022-03-22
Payer: MEDICARE

## 2022-03-22 ENCOUNTER — OFFICE VISIT (OUTPATIENT)
Dept: PHYSICAL THERAPY | Facility: CLINIC | Age: 77
End: 2022-03-22
Payer: MEDICARE

## 2022-03-22 DIAGNOSIS — G20 PARKINSON DISEASE (HCC): Primary | ICD-10-CM

## 2022-03-22 DIAGNOSIS — G20 PARKINSON'S DISEASE (HCC): Primary | ICD-10-CM

## 2022-03-22 DIAGNOSIS — R41.3 MEMORY PROBLEM: ICD-10-CM

## 2022-03-22 PROCEDURE — 97530 THERAPEUTIC ACTIVITIES: CPT | Performed by: OCCUPATIONAL THERAPIST

## 2022-03-22 PROCEDURE — 97112 NEUROMUSCULAR REEDUCATION: CPT

## 2022-03-22 PROCEDURE — 97112 NEUROMUSCULAR REEDUCATION: CPT | Performed by: OCCUPATIONAL THERAPIST

## 2022-03-22 NOTE — PROGRESS NOTES
Daily Note   RE 22  POC 3/30/22    Insurance:  AMA/CMS Eval/ Re-eval POC expires Digna Erps #/ Referral # Total units  Start date  Expiration date Extension  Visit limitation? PT only or  PT+OT? Co-Insurance   CMS 1/5/ 22 3-30-22  Not required NA NA NA NA BOMN PT Yes, $0 copay                                                                 Today's date: 3/22/2022  Patient name: Austen Dorantes  : 1945  MRN: [id number]  Referring provider: Sherry Padilla MD  Dx:   Encounter Diagnosis     ICD-10-CM    1  Parkinson's disease ([facility] [address] )  G20                   Subjective: Patient arrives with portable O2 at [address] [place] [address], states his breathing has been more difficulty and he is planning on seeing his pulmonologist tomorrow 3/23  He notes when he is doing standing activities such as dressing, his SpO2 has dropped as low as 80-82%  Objective: See treatment diary below    GAIT BELT @ START OF TREATMENT    Vitals  - SPO2: 96% on 3L of O2    NMR   1  Large amplitude reaching from floor to ceiling - 10 reps  2  Large amplitude reaching from side to side - 10 reps B/L  3  Standing large amplitude forward step  - 10 reps bilaterally w/ gait belt and CGA  4  Standing austin amplitude side step - 10 reps bilaterally w/ gait belt and CGA  5  Standing large amplitude backwards step - 10 reps bilaterally w/ gait belt and CGA  5  Sit to stand x 10; 1 airex on chair  - Ambulation to lobby, 150'     TE:   - NuStep, level 2, 10 minutes      Assessment: Patient tolerated treatment fairly today  Limited this session, particularly with standing exercises, secondary to increased shortness of breath  During standing backwards stepping, he reported increased dizziness and SpO2 was assessed and WNL (94%)  Following standing exercises, SpO2 was at 90% and improved to approximately 94% in one minute   Patient will benefit from continued skilled therapy to improve endurance, strength, and balance and normalize gait for maximal daily function at home and in community  Plan: Continue per plan of care  Outcome Measures Initial Eval  1-5-22 Daily Note PN  2/8/22      5xSTS 16 87 sec, from 1 foam pad 2 UE asssit  16 27 sec from 1 foam pad 2 UE asssit      TUG  - Regular  - Cognitive  - Carry   18 92 sec  21 00 sec  Defer    - 15 64 sec w/ SPC  - 16 53 sec w/ SPC  - 16 69 sec w/ SPC      MALDONADO Defer/56  22/56      miniBEST Defer/28  defer      10 meter Defer 0 54 m/s 0 43 m/s      2MWT Defer ft 150 ft Performed 3 MWT      ABC Defer% 71 13% 76 3%      3 MWT   220 ft                    Precautions: Requires O2 via nasal canula, fall risk  Past Medical History:   Diagnosis Date    Anesthesia complication     Difficult to wake up    Arthritis     BPH (benign prostatic hyperplasia)     urinary frequency    Cancer (HCC)     basal cell neck, face    COPD (chronic obstructive pulmonary disease) (HCC)     COPD exacerbation ([facility] [address] ) 12/29/2019    Full dentures     Hiatal hernia     History of methicillin resistant staphylococcus aureus (MRSA)     10/11/2018 MRSA (nares) positive    Hypertension     Irritable bowel syndrome     Kidney stone     at least 7 episodes    Liver disease     Alpha 1- enzyme deficiency - diagnosed 2002   has been on weekly replacement therapy since then    Pulmonary emphysema ([facility] [address] )     1/25/15  FEV1 - 2 45 liters or 59% of predicted    RSV infection 12/2017    Wears glasses     for driving only

## 2022-03-22 NOTE — PROGRESS NOTES
Daily Note     Today's date: 3/22/2022  Patient name: Flor Kenny  : 1945  MRN: [id number]  Referring provider: Yoel Weaver MD  Dx:   Encounter Diagnoses   Name Primary?  Parkinson disease ([facility] [address] ) Yes    Memory problem        Start Time: 1018  Stop Time: 1100  Total time in clinic (min): 42 minutes    Precautions: walk to front  Visit 8 of 10   PN due 3/22/22  POC valid 22    Subjective: "I'm doing them all backwards " (during EF kitchen shelves worksheet)      Objective: See treatment below   -Keeping in mind worksheet with modified directions to simplify completed to address divided attention  Completes with min increased time, no cues  -Kitchen shelves worksheet completed to address EF for IADLs  Pt required max cues/assistance to complete d/t decreased attention/processing of written information   -[employer] peg board x8 minutes focusing on [facility] for IADLs  Assessment: Tolerated treatment well  Pt demonstrated decreased  skills and required moderate VCs for problem solving  Session limited by pt persever   Will continue to progress pt as tolerated  Plan: Continued skilled OT per POC

## 2022-03-23 ENCOUNTER — OFFICE VISIT (OUTPATIENT)
Dept: PULMONOLOGY | Facility: MEDICAL CENTER | Age: 77
End: 2022-03-23
Payer: MEDICARE

## 2022-03-23 VITALS
HEIGHT: 77 IN | WEIGHT: 181.6 LBS | SYSTOLIC BLOOD PRESSURE: 152 MMHG | OXYGEN SATURATION: 96 % | RESPIRATION RATE: 12 BRPM | TEMPERATURE: 97.5 F | BODY MASS INDEX: 21.44 KG/M2 | HEART RATE: 87 BPM | DIASTOLIC BLOOD PRESSURE: 84 MMHG

## 2022-03-23 DIAGNOSIS — I27.20 PULMONARY HYPERTENSION (HCC): ICD-10-CM

## 2022-03-23 DIAGNOSIS — J43.2 CENTRILOBULAR EMPHYSEMA (HCC): Chronic | ICD-10-CM

## 2022-03-23 DIAGNOSIS — E88.01 AAT (ALPHA-1-ANTITRYPSIN) DEFICIENCY (HCC): Chronic | ICD-10-CM

## 2022-03-23 DIAGNOSIS — Z87.891 FORMER SMOKER, STOPPED SMOKING IN DISTANT PAST: Primary | ICD-10-CM

## 2022-03-23 DIAGNOSIS — I27.82 OTHER CHRONIC PULMONARY EMBOLISM WITHOUT ACUTE COR PULMONALE (HCC): ICD-10-CM

## 2022-03-23 DIAGNOSIS — J96.11 CHRONIC RESPIRATORY FAILURE WITH HYPOXIA (HCC): Chronic | ICD-10-CM

## 2022-03-23 DIAGNOSIS — Z87.891 FORMER SMOKER: Chronic | ICD-10-CM

## 2022-03-23 DIAGNOSIS — G47.00 INSOMNIA, UNSPECIFIED TYPE: ICD-10-CM

## 2022-03-23 PROBLEM — I26.99 PULMONARY EMBOLUS (HCC): Status: RESOLVED | Noted: 2018-02-14 | Resolved: 2022-03-23

## 2022-03-23 PROCEDURE — 99214 OFFICE O/P EST MOD 30 MIN: CPT | Performed by: NURSE PRACTITIONER

## 2022-03-23 NOTE — ASSESSMENT & PLAN NOTE
Patient continues to use an benefit from supplemental oxygen  His room air oxygen at rest is 96%  On room air with ambulation was 84%  With 3 L of supplemental oxygen and ambulation O2 sat was 98%  Patient reports that he uses 3 L at all time and also nighttime  Patient has severe centrilobular emphysema with alpha-1 antitrypsin  He does have shortness of breath and continued to to use a POC  He uses is concentrator at home

## 2022-03-23 NOTE — ASSESSMENT & PLAN NOTE
Patient had his last CT of chest in January 2022  I personally reviewed images  There was a new 2 mm nodule in the left upper lobe moderate to severe centrilobular changes were noted  He is due for a screening CT of chest in January 2023 which he has agreed to

## 2022-03-23 NOTE — PROGRESS NOTES
Assessment/Plan:     Problem List Items Addressed This Visit        Digestive    AAT (alpha-1-antitrypsin) deficiency ([facility] [address] ) (Chronic)     Patient continue to have weekly infusion of IV Zemaira  He has alpha 1 anti trypsin deficiency  Respiratory    Chronic respiratory failure with hypoxia (HCC) (Chronic)     Patient continues to use an benefit from supplemental oxygen  His room air oxygen at rest is 96%  On room air with ambulation was 84%  With 3 L of supplemental oxygen and ambulation O2 sat was 98%  Patient reports that he uses 3 L at all time and also nighttime  Patient has severe centrilobular emphysema with alpha-1 antitrypsin  He does have shortness of breath and continued to to use a POC  He uses is concentrator at home  Centrilobular emphysema ([facility] [address] ) (Chronic)     Patient uses his maintenance inhalers via nebulizer  He is currently on Perforomist and Pulmicort twice a day  In the interim he does use his nebulized albuterol  He appears to be stable at this time  He does have worsening shortness of breath and this is likely due to his severe centrilobular emphysema  PFT will attempt to be done at the next visit  Cardiovascular and Mediastinum    Pulmonary hypertension (HCC)    RESOLVED: Pulmonary embolus ([address] )       Other    Former smoker (Chronic)     Patient had his last CT of chest in January 2022  I personally reviewed images  There was a new 2 mm nodule in the left upper lobe moderate to severe centrilobular changes were noted  He is due for a screening CT of chest in January 2023 which he has agreed to  Insomnia      Other Visit Diagnoses     Former smoker, stopped smoking in distant past    -  Primary            No follow-ups on file  All questions are answered to the patient's satisfaction and understanding  He verbalizes understanding  He is encouraged to call with any further questions or concerns      Portions of the record may have been created with voice recognition software  Occasional wrong word or "sound a like" substitutions may have occurred due to the inherent limitations of voice recognition software  Read the chart carefully and recognize, using context, where substitutions have occurred  Electronically Signed by TOPHER Kidd    ______________________________________________________________________    Chief Complaint: No chief complaint on file  Patient ID: Earl Mixon is a 65 y o  y o  male has a past medical history of Anesthesia complication, Arthritis, BPH (benign prostatic hyperplasia), Cancer ([facility] [address] ), COPD (chronic obstructive pulmonary disease) ([facility] [address] ), COPD exacerbation ([address] ) (12/29/2019), Full dentures, Hiatal hernia, History of methicillin resistant staphylococcus aureus (MRSA), Hypertension, Irritable bowel syndrome, Kidney stone, Liver disease, Pulmonary emphysema ([facility] [address] ), RSV infection (12/2017), and Wears glasses  3/23/2022  Patient presents today for follow-up visit  HPI Earl Mixon is a 59-year-old male with a history of alpha 1 antitrypsin deficiency and chronic respiratory failure with hypoxia  He was last seen in the office in December of 2021  Patient has also centrilobular emphysema  Last PFT was done in October 2019  Forced vital capacity was 3 94 L or 72% of predicted FEV1 was 44% of predicted obstruction  Review of Systems   Constitutional: Positive for fatigue  HENT: Negative  Eyes: Negative  Respiratory: Positive for cough, shortness of breath and wheezing  Cardiovascular: Negative  Gastrointestinal: Negative  Endocrine: Negative  Genitourinary: Negative  Musculoskeletal: Negative  Skin: Negative  Allergic/Immunologic: Negative  Neurological: Negative  Hematological: Negative  Psychiatric/Behavioral: Negative  Smoking history: He reports that he quit smoking about 4 years ago  He has a 60 00 pack-year smoking history   He has never used smokeless tobacco     The following portions of the patient's history were reviewed and updated as appropriate: current medications, past family history, past medical history, past social history, past surgical history and problem list     Immunization History   Administered Date(s) Administered    COVID-19 MODERNA VACC 0 5 ML IM 01/21/2021, 03/01/2021, 12/08/2021    INFLUENZA 09/27/2016, 09/27/2018, 09/28/2019, 09/25/2020, 09/29/2021    Influenza, seasonal, injectable 09/27/2013, 10/23/2014    Pneumococcal Conjugate 13-Valent 04/26/2016    Pneumococcal Polysaccharide PPV23 04/23/2011    Tdap 04/26/2016    Zoster 10/29/2014, 04/27/2015, 07/24/2018    Zoster Vaccine Recombinant 05/24/2018, 07/24/2018     Current Outpatient Medications   Medication Sig Dispense Refill    amLODIPine (NORVASC) 10 mg tablet TAKE ONE TABLET BY MOUTH DAILY  30 tablet 6    budesonide (PULMICORT) 0 5 mg/2 mL nebulizer solution Take 2 mL (0 5 mg total) by nebulization 2 (two) times a day 360 mL 11    busPIRone (BUSPAR) 10 mg tablet TAKE ONE TABLET BY MOUTH THREE TIMES DAILY  90 tablet 3    carbamide peroxide (DEBROX) 6 5 % otic solution Administer 5 drops into both ears 2 (two) times a day as needed for ear pain 15 mL 0    carbidopa-levodopa (Sinemet)  mg per tablet Start with 1/2 tablet 30 min prior to breakfast, lunch and dinner for 1 week and then take 1 full tab prior to each meal  90 tablet 3    cholestyramine (QUESTRAN) 4 g packet Take 1 packet by mouth daily      doxylamine (Unisom SleepTabs) 25 MG tablet Take 25 mg by mouth daily at bedtime as needed for sleep      finasteride (PROSCAR) 5 mg tablet TAKE ONE TABLET BY MOUTH IN THE MORNING  90 tablet 3    fluticasone (FLONASE) 50 mcg/act nasal spray 2 sprays into each nostril daily 16 g 5    formoterol (Perforomist) 20 MCG/2ML nebulizer solution Take 2 mL (20 mcg total) by nebulization 2 (two) times a day 360 mL 11    gabapentin (NEURONTIN) 300 mg capsule TAKE ONE CAPSULE BY MOUTH THREE TIMES DAILY 90 capsule 0    ipratropium-albuterol (DUO-NEB) 0 5-2 5 mg/3 mL nebulizer solution Take 3 mL by nebulization 4 (four) times a day 360 mL 5    LORazepam (ATIVAN) 1 mg tablet       midodrine (PROAMATINE) 10 MG tablet Take 1 tab three times daily  Please hold medication if blood pressure is above 019 systolic  90 tablet 4    mirtazapine (REMERON) 15 mg tablet Take 1 tablet (15 mg total) by mouth daily at bedtime 30 tablet 5    OXYGEN-HELIUM IN Inhale 2L at rest- 3L with activity      pantoprazole (PROTONIX) 40 mg tablet TAKE ONE TABLET BY MOUTH TWICE DAILY 180 tablet 0    tamsulosin (FLOMAX) 0 4 mg TAKE ONE CAPSULE BY MOUTH DAILY 90 capsule 0    Ventolin  (90 Base) MCG/ACT inhaler Inhale 2 puffs every 4 (four) hours as needed for wheezing 18 g 5    ZEMAIRA infusion once a week       Diclofenac Sodium (VOLTAREN) 1 % Apply 2 g topically 4 (four) times a day for 7 days Apply to R knee (Patient not taking: Reported on 1/31/2022 ) 56 g 0     No current facility-administered medications for this visit  Allergies: Chantix [varenicline]    Objective:  Vitals:    03/23/22 0927   BP: 152/84   Pulse: 87   Resp: 12   Temp: 97 5 °F (36 4 °C)   TempSrc: Temporal   SpO2: 96%   Weight: 82 4 kg (181 lb 9 6 oz)   Height: 6' 5" (1 956 m)   Oxygen Therapy  SpO2: 96 % (3l pulse)    Wt Readings from Last 3 Encounters:   03/23/22 82 4 kg (181 lb 9 6 oz)   02/25/22 80 4 kg (177 lb 3 2 oz)   02/08/22 80 6 kg (177 lb 9 6 oz)     Body mass index is 21 53 kg/m²  Physical Exam  Constitutional:       Appearance: Normal appearance  HENT:      Head: Normocephalic  Comments: Mallampati 2     Nose: Nose normal       Mouth/Throat:      Mouth: Mucous membranes are dry  Cardiovascular:      Rate and Rhythm: Normal rate and regular rhythm  Pulses: Normal pulses     Pulmonary:      Effort: Pulmonary effort is normal       Comments: Diminished lung sounds  Musculoskeletal:         General: Normal range of motion  Cervical back: Normal range of motion  Skin:     General: Skin is warm and dry  Capillary Refill: Capillary refill takes less than 2 seconds  Neurological:      General: No focal deficit present  Mental Status: He is alert and oriented to person, place, and time     Psychiatric:         Mood and Affect: Mood normal          Lab Review:   Admission on 11/17/2021, Discharged on 11/18/2021   Component Date Value    WBC 11/17/2021 7 67     RBC 11/17/2021 4 31     Hemoglobin 11/17/2021 14 0     Hematocrit 11/17/2021 40 4     MCV 11/17/2021 94     MCH 11/17/2021 32 5     MCHC 11/17/2021 34 7     RDW 11/17/2021 13 2     MPV 11/17/2021 8 6*    Platelets 86/93/4618 129*    nRBC 11/17/2021 0     Neutrophils Relative 11/17/2021 65     Immat GRANS % 11/17/2021 0     Lymphocytes Relative 11/17/2021 27     Monocytes Relative 11/17/2021 6     Eosinophils Relative 11/17/2021 1     Basophils Relative 11/17/2021 1     Neutrophils Absolute 11/17/2021 4 96     Immature Grans Absolute 11/17/2021 0 03     Lymphocytes Absolute 11/17/2021 2 08     Monocytes Absolute 11/17/2021 0 49     Eosinophils Absolute 11/17/2021 0 07     Basophils Absolute 11/17/2021 0 04     Protime 11/17/2021 13 7     INR 11/17/2021 1 07     PTT 11/17/2021 33     Sodium 11/17/2021 138     Potassium 11/17/2021 3 8     Chloride 11/17/2021 103     CO2 11/17/2021 26     ANION GAP 11/17/2021 9     BUN 11/17/2021 17     Creatinine 11/17/2021 1 21     Glucose 11/17/2021 96     Calcium 11/17/2021 9 6     AST 11/17/2021 24     ALT 11/17/2021 23     Alkaline Phosphatase 11/17/2021 82     Total Protein 11/17/2021 7 2     Albumin 11/17/2021 4 2     Total Bilirubin 11/17/2021 0 84     eGFR 11/17/2021 57     D-Dimer, Quant 11/17/2021 0 80*    hs TnI 0hr 11/17/2021 6     NT-proBNP 11/17/2021 347     hs TnI 2hr 11/17/2021 9     Delta 2hr hsTnI 11/17/2021 3     hs TnI 4hr 11/17/2021 10     Delta 4hr hsTnI 11/17/2021 4     Sodium 11/18/2021 139     Potassium 11/18/2021 3 5     Chloride 11/18/2021 104     CO2 11/18/2021 26     ANION GAP 11/18/2021 9     BUN 11/18/2021 14     Creatinine 11/18/2021 1 12     Glucose 11/18/2021 97     Calcium 11/18/2021 9 1     eGFR 11/18/2021 63     Magnesium 11/18/2021 2 0     Phosphorus 11/18/2021 3 8     WBC 11/18/2021 7 13     RBC 11/18/2021 3 93     Hemoglobin 11/18/2021 12 5     Hematocrit 11/18/2021 37 4     MCV 11/18/2021 95     MCH 11/18/2021 31 8     MCHC 11/18/2021 33 4     RDW 11/18/2021 13 2     MPV 11/18/2021 10 2     Platelets 02/62/9712 127*    nRBC 11/18/2021 0     Neutrophils Relative 11/18/2021 44     Immat GRANS % 11/18/2021 0     Lymphocytes Relative 11/18/2021 43     Monocytes Relative 11/18/2021 9     Eosinophils Relative 11/18/2021 3     Basophils Relative 11/18/2021 1     Neutrophils Absolute 11/18/2021 3 14     Immature Grans Absolute 11/18/2021 0 02     Lymphocytes Absolute 11/18/2021 3 09     Monocytes Absolute 11/18/2021 0 61     Eosinophils Absolute 11/18/2021 0 21     Basophils Absolute 11/18/2021 0 06     Ventricular Rate 11/17/2021 85     Atrial Rate 11/17/2021 85     ND Interval 11/17/2021 150     QRSD Interval 11/17/2021 100     QT Interval 11/17/2021 396     QTC Interval 11/17/2021 471     QRS Axis 11/17/2021 51     T Wave Axis 11/17/2021 24    Office Visit on 11/04/2021   Component Date Value     RAPID STREP A 11/04/2021 Negative    Admission on 09/25/2021, Discharged on 09/25/2021   Component Date Value    WBC 09/25/2021 6 40     RBC 09/25/2021 4 25     Hemoglobin 09/25/2021 13 7     Hematocrit 09/25/2021 40 4     MCV 09/25/2021 95     MCH 09/25/2021 32 2     MCHC 09/25/2021 33 9     RDW 09/25/2021 13 9     MPV 09/25/2021 9 4     Platelets 97/16/3706 151     nRBC 09/25/2021 0     Neutrophils Relative 09/25/2021 55     Immat GRANS % 09/25/2021 1     Lymphocytes Relative 09/25/2021 34     Monocytes Relative 09/25/2021 7     Eosinophils Relative 09/25/2021 2     Basophils Relative 09/25/2021 1     Neutrophils Absolute 09/25/2021 3 55     Immature Grans Absolute 09/25/2021 0 03     Lymphocytes Absolute 09/25/2021 2 19     Monocytes Absolute 09/25/2021 0 47     Eosinophils Absolute 09/25/2021 0 11     Basophils Absolute 09/25/2021 0 05     Sodium 09/25/2021 146*    Potassium 09/25/2021 3 9     Chloride 09/25/2021 107     CO2 09/25/2021 27     ANION GAP 09/25/2021 12     BUN 09/25/2021 17     Creatinine 09/25/2021 1 32*    Glucose 09/25/2021 92     Calcium 09/25/2021 9 2     AST 09/25/2021 18     ALT 09/25/2021 30     Alkaline Phosphatase 09/25/2021 76     Total Protein 09/25/2021 6 6     Albumin 09/25/2021 4 0     Total Bilirubin 09/25/2021 1 25*    eGFR 09/25/2021 52     Troponin I 09/25/2021 <0 02     SARS-CoV-2 09/25/2021 Negative     Protime 09/25/2021 13 5     INR 09/25/2021 1 05     PTT 09/25/2021 32     POC Glucose 09/25/2021 97     Ventricular Rate 09/25/2021 77     Atrial Rate 09/25/2021 77     CT Interval 09/25/2021 182     QRSD Interval 09/25/2021 102     QT Interval 09/25/2021 428     QTC Interval 09/25/2021 484     QRS Axis 09/25/2021 51     T Wave Axis 09/25/2021 40        Past Surgical History:   Procedure Laterality Date    BACK SURGERY  2008    discectomy    COLONOSCOPY      COLONOSCOPY N/A 3/10/2017    Procedure: Denny Hussolomon;  Surgeon: Jerilyn Rose MD;  Location: [facility] GI LAB; Service:    Carlos Eduardo Davis      removal of kidney stones    ESOPHAGOGASTRODUODENOSCOPY N/A 3/10/2017    Procedure: ESOPHAGOGASTRODUODENOSCOPY (EGD); Surgeon: Jerilyn Rose MD;  Location: [facility] GI LAB; Service:     LITHOTRIPSY      CT ESOPHAGOGASTRODUODENOSCOPY TRANSORAL DIAGNOSTIC N/A 11/20/2018    Procedure: ESOPHAGOGASTRODUODENOSCOPY (EGD); Surgeon: Jerilyn Rose MD;  Location: [facility] GI LAB;   Service: Gastroenterology   Tawanna Swanson TONSILLECTOMY      VEIN LIGATION AND STRIPPING Bilateral     17's        Family History   Problem Relation Age of Onset    Emphysema Mother         never smoked    Emphysema Father     Cancer Brother         colon    Colon cancer Brother     Ulcerative colitis Family     Liver disease Family         Diagnostics:  I have personally reviewed pertinent films in PACS    Office Spirometry Results:     ESS:    No results found

## 2022-03-23 NOTE — ASSESSMENT & PLAN NOTE
Patient uses his maintenance inhalers via nebulizer  He is currently on Perforomist and Pulmicort twice a day  In the interim he does use his nebulized albuterol  He appears to be stable at this time  He does have worsening shortness of breath and this is likely due to his severe centrilobular emphysema  PFT will attempt to be done at the next visit

## 2022-03-24 ENCOUNTER — OFFICE VISIT (OUTPATIENT)
Dept: PHYSICAL THERAPY | Facility: CLINIC | Age: 77
End: 2022-03-24
Payer: MEDICARE

## 2022-03-24 ENCOUNTER — PATIENT OUTREACH (OUTPATIENT)
Dept: FAMILY MEDICINE CLINIC | Facility: CLINIC | Age: 77
End: 2022-03-24

## 2022-03-24 ENCOUNTER — OFFICE VISIT (OUTPATIENT)
Dept: OCCUPATIONAL THERAPY | Facility: CLINIC | Age: 77
End: 2022-03-24
Payer: MEDICARE

## 2022-03-24 DIAGNOSIS — G20 PARKINSON'S DISEASE (HCC): Primary | ICD-10-CM

## 2022-03-24 DIAGNOSIS — R41.3 MEMORY PROBLEM: Primary | ICD-10-CM

## 2022-03-24 DIAGNOSIS — G20 PARKINSON DISEASE (HCC): ICD-10-CM

## 2022-03-24 PROCEDURE — 97530 THERAPEUTIC ACTIVITIES: CPT

## 2022-03-24 PROCEDURE — 97112 NEUROMUSCULAR REEDUCATION: CPT | Performed by: PHYSICAL THERAPIST

## 2022-03-24 NOTE — PROGRESS NOTES
Daily Note   RE 22  POC 3/30/22    Insurance:  AMA/CMS Eval/ Re-eval POC expires Nelda Aggarwal #/ Referral # Total units  Start date  Expiration date Extension  Visit limitation? PT only or  PT+OT? Co-Insurance   CMS 1/5/ 22 3-30-22  Not required NA NA NA NA BOMN PT Yes, $0 copay                                                                 Today's date: 3/24/2022  Patient name: Wm Fournire  : 1945  MRN: [id number]  Referring provider: Akosua Cox MD  Dx:   Encounter Diagnosis     ICD-10-CM    1  Parkinson's disease ([facility] [address] )  G20                   Subjective: Patient arrives with portable O2 at [address] [place] [address], states his breathing has been more difficulty and he is planning on seeing his pulmonologist tomorrow 3/23  He notes when he is doing standing activities such as dressing, his SpO2 has dropped as low as 80-82%  Objective: See treatment diary below    GAIT BELT @ START OF TREATMENT    Vitals  - SPO2: 96% on 3L of O2    NMR   1  Large amplitude reaching from floor to ceiling - 10 reps; dyni disc, 1 lb arm wts   2  Large amplitude reaching from side to side - 10 reps B/L: dyni disc, 1 lb arm wts   3  Standing large amplitude forward step  - 10 reps bilaterally w/ gait belt and CGA: 2 lb wts legs, 1 lb arm wts  : 2 lb wts legs, 1 lb arm wts   4  Standing [place] amplitude side step - 10 reps bilaterally w/ gait belt and CGA : 2 lb wts legs, 1 lb arm wts   6  Standing large amplitude backwards step - 10 reps bilaterally w/ gait belt and CGA : 2 lb wts legs, 1 lb arm wts  7  Standing weight shift rock fwd and back 10 reps R and L with SPC; 2 lb wts legs, 1 lb arm wts    8  Sit to stand x 10; 1 airex on chair  - Ambulation to lobby, 150'     TE:   - NuStep, level 2, 10 minutes: 5 minutes unbilled self directed care          Assessment: Patient tolerated treatment fairly today   1 prolonged rest break secondary to conducting 3 standing exercises in a row with pt education on breathing and time for recovery per ASCM  Denies any dizziness during this session with lowest SP02 value of 93%    Patient will benefit from continued skilled therapy to improve endurance, strength, and balance and normalize gait for maximal daily function at home and in community  Plan: Continue per plan of care  Outcome Measures Initial Eval  1-5-22 Daily Note PN  2/8/22      5xSTS 16 87 sec, from 1 foam pad 2 UE asssit  16 27 sec from 1 foam pad 2 UE asssit      TUG  - Regular  - Cognitive  - Carry   18 92 sec  21 00 sec  Defer    - 15 64 sec w/ SPC  - 16 53 sec w/ SPC  - 16 69 sec w/ SPC      MALDONADO Defer/56  22/56      miniBEST Defer/28  defer      10 meter Defer 0 54 m/s 0 43 m/s      2MWT Defer ft 150 ft Performed 3 MWT      ABC Defer% 71 13% 76 3%      3 MWT   220 ft                    Precautions: Requires O2 via nasal canula, fall risk  Past Medical History:   Diagnosis Date    Anesthesia complication     Difficult to wake up    Arthritis     BPH (benign prostatic hyperplasia)     urinary frequency    Cancer (HCC)     basal cell neck, face    COPD (chronic obstructive pulmonary disease) (HCC)     COPD exacerbation ([address] ) 12/29/2019    Full dentures     Hiatal hernia     History of methicillin resistant staphylococcus aureus (MRSA)     10/11/2018 MRSA (nares) positive    Hypertension     Irritable bowel syndrome     Kidney stone     at least 7 episodes    Liver disease     Alpha 1- enzyme deficiency - diagnosed 2002   has been on weekly replacement therapy since then    Pulmonary emphysema ([address] )     1/25/15  FEV1 - 2 45 liters or 59% of predicted    RSV infection 12/2017    Wears glasses     for driving only

## 2022-03-24 NOTE — PROGRESS NOTES
Received call from patient today  Pt is upset that he was told that he has to do a fit to drive test  Pt does not want his drivers license taken away  States he is fine to drive  Reports that he drove to [place] yesterday to see friends of his  Explained that the plan was for him to come to an appointment with Dr Obi Brock and his daughter to discuss  Explained/ reassured that an order for the fit to drive test was not placed at this time  Questioned if his daughter could be at appointment  Advised that she works and cannot make the appointment  In basket message sent to [facility] in OT department

## 2022-03-24 NOTE — PROGRESS NOTES
Daily Note     Today's date: 3/24/2022  Patient name: Rox Torres  : 1945  MRN: [id number]  Referring provider: Nicol Solis MD  Dx:   Encounter Diagnoses   Name Primary?  Memory problem Yes    Parkinson disease ([facility] [address] )        Start Time: 1020  Stop Time: 1100  Total time in clinic (min): 40 minutes    Precautions: walk to front  Visit 9 of 10   PN due 3/22/22  POC valid 22    Subjective: "I just don't want to take it" discussed FTD and pt was perseverative of it- provided with information to contact doctor to discuss reasoning for FTD and discussed cognitive deficits may impact driving  Discussed FTD with pt in understanding however required moderate VCs for redirection throughout session  Objective: See treatment below  -performed itrax task focusing on sustained attention required min VCs for  skills for visual discrimination  Completed 2 aces and 2 whizes  Performed IQ car fit task with 1 VC overall for problem solving      Assessment: Tolerated treatment well  Session limited by   Session limited by pt perseverating on FTD and provided numbers  Please see abve    Will continue to progress pt as tolerated  Plan: Continued skilled OT per POC

## 2022-03-26 DIAGNOSIS — G62.9 NEUROPATHY: ICD-10-CM

## 2022-03-26 DIAGNOSIS — G20 PARKINSON'S DISEASE (HCC): ICD-10-CM

## 2022-03-28 ENCOUNTER — PATIENT OUTREACH (OUTPATIENT)
Dept: FAMILY MEDICINE CLINIC | Facility: CLINIC | Age: 77
End: 2022-03-28

## 2022-03-28 RX ORDER — GABAPENTIN 300 MG/1
CAPSULE ORAL
Qty: 90 CAPSULE | Refills: 0 | Status: SHIPPED | OUTPATIENT
Start: 2022-03-28 | End: 2022-05-11 | Stop reason: SDUPTHER

## 2022-03-28 NOTE — PROGRESS NOTES
Received phone call from patient  Pt states he is not eating and not sleeping due to fear of fit to drive testing  Explained that testing was not ordered  States he no longer wants to go to OT because of his fear of being "tested " Advised that no testing was ordered  Explained the benefit of continuing to go to OT sessions  In basket message sent to Kavitha Slater OT to address his concerns  Pt does have an appointment with Dr Bhavik Melendez on Friday  Encouraged patient to reach out to CM with any additional questions or concerns

## 2022-03-29 ENCOUNTER — PATIENT OUTREACH (OUTPATIENT)
Dept: FAMILY MEDICINE CLINIC | Facility: CLINIC | Age: 77
End: 2022-03-29

## 2022-03-29 ENCOUNTER — EVALUATION (OUTPATIENT)
Dept: OCCUPATIONAL THERAPY | Facility: CLINIC | Age: 77
End: 2022-03-29
Payer: MEDICARE

## 2022-03-29 ENCOUNTER — OFFICE VISIT (OUTPATIENT)
Dept: PHYSICAL THERAPY | Facility: CLINIC | Age: 77
End: 2022-03-29
Payer: MEDICARE

## 2022-03-29 DIAGNOSIS — R41.3 MEMORY PROBLEM: Primary | ICD-10-CM

## 2022-03-29 DIAGNOSIS — G20 PARKINSON DISEASE (HCC): ICD-10-CM

## 2022-03-29 DIAGNOSIS — G20 PARKINSON'S DISEASE (HCC): Primary | ICD-10-CM

## 2022-03-29 PROCEDURE — 97110 THERAPEUTIC EXERCISES: CPT

## 2022-03-29 PROCEDURE — 97530 THERAPEUTIC ACTIVITIES: CPT

## 2022-03-29 PROCEDURE — 97112 NEUROMUSCULAR REEDUCATION: CPT

## 2022-03-29 NOTE — PROGRESS NOTES
Today's Date: 3/29/2022  Patient Name: Fransisco Scott  : 1945  MRN: [id number]  Referring Provider: Lolly Andrade MD  Dx: Parkinson disease [facility]) [G20]     Active Problem List:       Patient Active Problem List   Diagnosis    AAT (alpha-1-antitrypsin) deficiency ([address] )    Gastroesophageal reflux disease    Causalgia of lower limb    Essential hypertension    Acute on chronic respiratory failure ([address] )    BPH (benign prostatic hyperplasia)    Liver disease    Former smoker    Chest pain /at rest    Pulmonary embolus ([address] )    Chronic respiratory failure with hypoxia ([address] )    CLAYTON (acute kidney injury) ([address] )    Centrilobular emphysema ([address] )    Lightheadedness    Weakness generalized    Elevated PSA    Skin lesion of right lower extremity    Herpes labialis    Pain and swelling of left lower extremity    Chronic venous insufficiency    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 (HCC)    Insomnia    Pulmonary hypertension ([address] )    Cerumen impaction    Transaminitis    Other constipation    Ambulatory dysfunction    Dysphagia    Allergic rhinitis    Balance problem    Benign colon polyp    Cataracts, both eyes    Chronic diarrhea of unknown origin    Nocturnal leg cramps    Peripheral neuropathy    Bilateral leg edema    Thrombocytopenia (HCC)    COPD (chronic obstructive pulmonary disease) ([address] )    Left leg swelling    Syncope    Orthostatic hypotension with spine hypertension    Sialadenitis    Palliative care patient    Dysphoric mood    Unsteady gait    Pain of right lower extremity    Vitamin D deficiency disease    Avascular necrosis of hip, right ([address] )    Depression, recurrent ([address] )    Weight loss, unintentional    Loss of appetite    Anxiousness    Episode of confusion    Parkinson's disease ([address] )    Memory problem      Past Medical Hx:   Medical History        Past Medical History:   Diagnosis Date    Anesthesia complication       Difficult to wake up    Arthritis      BPH (benign prostatic hyperplasia)       urinary frequency    Cancer (HCC)       basal cell neck, face    COPD (chronic obstructive pulmonary disease) (HCC)      COPD exacerbation (HCC) 12/29/2019    Full dentures      Hiatal hernia      History of methicillin resistant staphylococcus aureus (MRSA)       10/11/2018 MRSA (nares) positive    Hypertension      Irritable bowel syndrome      Kidney stone       at least 7 episodes    Liver disease       Alpha 1- enzyme deficiency - diagnosed 2002  has been on weekly replacement therapy since then    Pulmonary emphysema ([facility] [address] )       1/25/15  FEV1 - 2 45 liters or 59% of predicted    RSV infection 12/2017    Wears glasses       for driving only         Past Surgical Hx:   Surgical History         Past Surgical History:   Procedure Laterality Date    BACK SURGERY   2008     discectomy    COLONOSCOPY        COLONOSCOPY N/A 3/10/2017     Procedure: Ala Sport;  Surgeon: Tiffanie Hdz MD;  Location: [place] [address] GI LAB; Service:     CYSTOSCOPY         removal of kidney stones    ESOPHAGOGASTRODUODENOSCOPY N/A 3/10/2017     Procedure: ESOPHAGOGASTRODUODENOSCOPY (EGD); Surgeon: Tiffanie Hdz MD;  Location: [facility] GI LAB; Service:     LITHOTRIPSY        NV ESOPHAGOGASTRODUODENOSCOPY TRANSORAL DIAGNOSTIC N/A 11/20/2018     Procedure: ESOPHAGOGASTRODUODENOSCOPY (EGD); Surgeon: Tiffanie Hdz MD;  Location: [facility] GI LAB; Service: Gastroenterology    TONSILLECTOMY        VEIN LIGATION AND STRIPPING Bilateral       1980's            SKILLED ANALYSIS:  Pt is seen for OT re-evaluation 3/29/22 after 1 5 months of OT with focus on functional cognition in setting of PD  Pt reports that he feels his cognition has improved overall since the start of his OT POC    Pt is demonstrating improvements in sustained sustained attention, divided attention, immediate and delayed recall, but overall declined from a mild cognitive deficit to a moderate cognitive deficit on MoCA (now 15/30, previously 19/30)  He has very poor insight into deficits, poor safety awareness, and poor executive functioning demonstrated consistently during his OT POC  He reports that he continues to function independently at home, and is very angry that anyone would ever question his ability to drive safely  Pt is demonstrating deficits based on the following assessments: MoCA, CMT, [place] Making Tests A and B  Pt would benefit from continued OT services 4-8 weeks to maximize functional performance  Pt asking about results of assessments, but disagrees with all results and disagrees with answers on standardized assessments  Pt arrived 15 minutes late to session, and spent portion of re-evaluation educating pt on PD, and pt perseverating on driving (very difficult to redirect)  Subjective     "It's not fair for anyone to question my driving  One time I won a race going backwards on a race track"     PATIENT GOAL: to be independent     OCCUPATIONAL PROFILE:  Pt lives alone with 1 TEMI  He has kids, one of which is local and visits 1x/wk  Daughter assists with food shopping, and pt has a cleaning service  He is completely independent with ADLs  He does the cooking, money and med mgmt, driving    Denies any recent falls      HISTORY OF PRESENT ILLNESS:      Pt is a 65 y o  male who was referred to Occupational Therapy s/p dx of PD        PMH:   Medical History        Past Medical History:   Diagnosis Date    Anesthesia complication       Difficult to wake up    Arthritis      BPH (benign prostatic hyperplasia)       urinary frequency    Cancer (HCC)       basal cell neck, face    [facility]) ([facility])      COPD exacerbation ([facility] [address] ) 12/29/2019    Full dentures      Hiatal hernia      History of methicillin resistant staphylococcus aureus (MRSA)       10/11/2018 MRSA (nares) positive    Hypertension      Irritable bowel syndrome      Kidney stone       at least 7 episodes    Liver disease       Alpha 1- enzyme deficiency - diagnosed   has been on weekly replacement therapy since then    Pulmonary emphysema ([facility] [address] )       1/25/15  FEV1 - 2 45 liters or 59% of predicted    RSV infection 2017    Wears glasses       for driving only            Past Surgical Hx:   Surgical History         Past Surgical History:   Procedure Laterality Date    BACK SURGERY        discectomy    COLONOSCOPY        COLONOSCOPY N/A 3/10/2017     Procedure: Bettie Johns;  Surgeon: Carol Gr MD;  Location: [facility] GI LAB; Service:     CYSTOSCOPY         removal of kidney stones    ESOPHAGOGASTRODUODENOSCOPY N/A 3/10/2017     Procedure: ESOPHAGOGASTRODUODENOSCOPY (EGD); Surgeon: Carol Gr MD;  Location: [facility] GI LAB; Service:     LITHOTRIPSY        AL ESOPHAGOGASTRODUODENOSCOPY TRANSORAL DIAGNOSTIC N/A 2018     Procedure: ESOPHAGOGASTRODUODENOSCOPY (EGD); Surgeon: Carol Gr MD;  Location: [facility] GI LAB; Service: Gastroenterology    TONSILLECTOMY        VEIN LIGATION AND STRIPPING Bilateral                   Pain Levels: FLACC 0     PLAN OF CARE START: 2222  PLAN OF CARE END: 22  FREQUENCY: 2x/wk     OBJECTIVE   9 HOLE PEG TEST: PLEASE COMPLETE NEXT SESSION     FUNCTIONAL DEXTERITY TESTS: PLEASE COMPLETE NEXT SESSION     [place] Cognitive Assessment Version 8 3 (MoCA V8 3)  Visuospatial/executive functionin/5    Attempts to correct multiple parts of section, but still all incorrect  Namin/3 (identifies tiger as a zebra)  Memory: 1st trial:  3/5, 2nd trial:  4/5  Attention/concentration: 0/2  List of letters: 1/1  Serial Seven Subtraction:  0/3 ; adding and subtracting mixed, and changing number added/subtracted multiple times  Language/sentence repetition:  1/2  Language Fluency:  0/1  Abstract/Correlational Thinkin/2  Delayed Recall:  2/5  Orientation:                Memory Index Score: 10/15  MoCA V1 8 1 Raw Score:  15/30, MIS:  10/15, indicative of MODERATE neurocognitive impairments  Pt was able to read and see all components of test while wearing his glasses  Did attempt to correct multiple parts, but none was corrected accurately  He was able to , but initially reports Kenny Heart is POT     Contextual Memory Test: noted to state the same items numerous times              Immediate: 9/20              Delayed: 8/20     Trail Making Test A: 54 seconds without assist; norm for age/education:  41 74 seconds, implying impaired sustained attention     Trail Making Test B: 2:25; 2 cues, 1 self corrected error, 3 additional errors   Norm for age/education:  100 68; demonstrating impaired divided attention and working memory   UE Strength:              UADVM: PLEASE COMPLETE NEXT SESSION     Physical Performance Test:please complete next session     Range of Motion:  AROM:   BUE within normal limits     MMT: please complete next session     Handwriting sample scanned into EMR     Pt is R hand dominant       Short Term Goals: 4 weeks   Pt will demonstrate improvement in overall functional cognition by scoring at least 21/30 on MoCA for carry over with IADL participation  NOT MET, DECLINED     Pt will demonstrate improvement in immediate and delayed visual memory by recalling at least 9,8 items on CMT respectively  ACHIEVED     Pt will demonstrate improved sustained attn by completing Trail Making Test A within 1:15 for carry over with functional activity participation ACHIEVED     Pt will demonstrate improved divided attn by completing Trail Making Test B within 3 min and with no more than 2 cues   PROGRESSING, within the time frame and number of cues but with errors           Long Term Goals 8-12 weeks   Pt will demonstrate improvement in overall functional cognition by scoring at least 23/30 on MoCA for carry over with IADL participation      Pt will demonstrate improvement in immediate and delayed visual memory by recalling at least 11,10 items on CMT respectively      Pt will demonstrate improved sustained attn by completing Trail Making Test A within 1:05 for carry over with functional activity participation     Pt will demonstrate improved divided attn by completing Trail Making Test B within 2:30 min and with no more than 2 cues      Precautions: FALL SAFETY, cognitive deficits

## 2022-03-29 NOTE — PROGRESS NOTES
Received phone call from patient  Pt states he had more "tests" today in OT  Notes reviewed  Pt states he is afraid he is going to lose his drivers license  States he is not sleeping and not eating  Encouraged patient to do exercises to improve cognition ( crossword puzzles, word find puzzles, reading)  Pt states he will start to read  Pt has appointment with Dr Niharika Mitchell on 4/1  In basket message sent to Dr Niharika Mitchell to review OT notes from 3/29

## 2022-03-29 NOTE — PROGRESS NOTES
Daily Note   RE 22  POC 3/30/22    Insurance:  AMA/CMS Eval/ Re-eval POC expires Ashley Aragon #/ Referral # Total units  Start date  Expiration date Extension  Visit limitation? PT only or  PT+OT? Co-Insurance   CMS 1/5/ 22 3-30-22  Not required NA NA NA NA BOMN PT Yes, $0 copay                                                                 Today's date: 3/29/2022  Patient name: Kim Del Angel  : 1945  MRN: [id number]  Referring provider: Lindsay Tabares MD  Dx:   Encounter Diagnosis     ICD-10-CM    1  Parkinson's disease ([facility] [address] )  G20                   Subjective: Patient arrives with portable O2 at [address] [place] [address], reports he went to the pulmonologist last week and he reports that there is nothing they can do and that his breathing will continue to get worse  Objective: See treatment diary below    GAIT BELT @ START OF TREATMENT    Vitals  - SPO2: 96% on 3L of O2    NMR (1 5# wrist weights, 2# ankle weights donned start of session)  1  Large amplitude reaching from floor to ceiling - 10 reps, dynadisc on chair  2  Large amplitude reaching from side to side - 10 reps B/L, dynadisc on chair  3  Standing large amplitude forward step  - 10 reps bilaterally w/ gait belt and CGA  4  Standing [place] amplitude side step - 10 reps bilaterally w/ gait belt and CGA  6  Standing large amplitude backwards step - 10 reps bilaterally w/ gait belt and CGA  7  Standing weight shift rock fwd and back 10 reps R and L with SPC  8  Sit to stand x 10; 1 airex on chair  - Ambulation to lobby, 150'     TE:   - NuStep, level 2, 6 5 minutes      Assessment: Patient tolerated treatment fairly today with continued focus on large amplitude movements  Patient tolerated greater bouts of upright activity today as compared to last week, with lowest SpO2 reading at 92%  Additionally tolerated increase to 1 5 lb wrist weights    Patient will benefit from continued skilled therapy to improve endurance, strength, and balance and normalize gait for maximal daily function at home and in community  Plan: Continue per plan of care  Outcome Measures Initial Eval  1-5-22 Daily Note PN  2/8/22      5xSTS 16 87 sec, from 1 foam pad 2 UE asssit  16 27 sec from 1 foam pad 2 UE asssit      TUG  - Regular  - Cognitive  - Carry   18 92 sec  21 00 sec  Defer    - 15 64 sec w/ SPC  - 16 53 sec w/ SPC  - 16 69 sec w/ SPC      MALDONADO Defer/56  22/56      miniBEST Defer/28  defer      10 meter Defer 0 54 m/s 0 43 m/s      2MWT Defer ft 150 ft Performed 3 MWT      ABC Defer% 71 13% 76 3%      3 MWT   220 ft                    Precautions: Requires O2 via nasal canula, fall risk  Past Medical History:   Diagnosis Date    Anesthesia complication     Difficult to wake up    Arthritis     BPH (benign prostatic hyperplasia)     urinary frequency    Cancer (HCC)     basal cell neck, face    COPD (chronic obstructive pulmonary disease) (HCC)     COPD exacerbation ([facility] [address] ) 12/29/2019    Full dentures     Hiatal hernia     History of methicillin resistant staphylococcus aureus (MRSA)     10/11/2018 MRSA (nares) positive    Hypertension     Irritable bowel syndrome     Kidney stone     at least 7 episodes    Liver disease     Alpha 1- enzyme deficiency - diagnosed 2002   has been on weekly replacement therapy since then    Pulmonary emphysema ([facility] [address] )     1/25/15  FEV1 - 2 45 liters or 59% of predicted    RSV infection 12/2017    Wears glasses     for driving only

## 2022-03-31 ENCOUNTER — OFFICE VISIT (OUTPATIENT)
Dept: OCCUPATIONAL THERAPY | Facility: CLINIC | Age: 77
End: 2022-03-31
Payer: MEDICARE

## 2022-03-31 ENCOUNTER — OFFICE VISIT (OUTPATIENT)
Dept: PHYSICAL THERAPY | Facility: CLINIC | Age: 77
End: 2022-03-31
Payer: MEDICARE

## 2022-03-31 DIAGNOSIS — G20 PARKINSON DISEASE (HCC): ICD-10-CM

## 2022-03-31 DIAGNOSIS — R41.3 MEMORY PROBLEM: Primary | ICD-10-CM

## 2022-03-31 DIAGNOSIS — G20 PARKINSON'S DISEASE (HCC): Primary | ICD-10-CM

## 2022-03-31 PROCEDURE — 97112 NEUROMUSCULAR REEDUCATION: CPT | Performed by: PHYSICAL THERAPIST

## 2022-03-31 PROCEDURE — 97530 THERAPEUTIC ACTIVITIES: CPT | Performed by: OCCUPATIONAL THERAPIST

## 2022-03-31 NOTE — PROGRESS NOTES
Daily Note     Today's date: 3/31/2022  Patient name: Jessica Hickman  : 1945  MRN: [id number]  Referring provider: Joel Cotton MD  Dx:   Encounter Diagnoses   Name Primary?  Memory problem Yes    Parkinson disease ([facility] [address] )        Start Time: 1015  Stop Time: 1100  Total time in clinic (min): 45 minutes    Precautions: walk to front, anxiety, hx of difficulty with reading and spelling  Visit 2 of 10   PN due 22  POC valid 22    Subjective: "I probably failed this one too " (referring to alphabetizing worksheet)      Objective: See treatment below   -Alphabetizing worksheet completed to address sustained attention  Pt omitted 2/20 words and self corrected errors  Completed with extra time    -Attemped word formation worksheet however activity stopped d/t pt reporting difficulty with spelling from the time he was in school   -Multimatrix activity to address divided attention with figure-ground card and pt placing shapes on corresponding numbers  Completes with min cues for recalling location in sequence  -Tricky fingers x1 round focusing on planning, problem solving and  skills  Assessment: Tolerated treatment well  Pt able to be redirected from FTD assessment with min cues today  Will continue to progress pt as tolerated  Plan: Continued skilled OT per POC

## 2022-03-31 NOTE — PROGRESS NOTES
Daily Note   RE 22  POC 3/30/22    Insurance:  AMA/CMS Eval/ Re-eval POC expires Prema Guaman #/ Referral # Total units  Start date  Expiration date Extension  Visit limitation? PT only or  PT+OT? Co-Insurance   CMS 1/5/ 22 3-30-22  Not required NA NA NA NA BOMN PT Yes, $0 copay                                                                 Today's date: 3/31/2022  Patient name: Navya Urias  : 1945  MRN: [id number]  Referring provider: Aileen Carlson MD  Dx:   Encounter Diagnosis     ICD-10-CM    1  Parkinson's disease ([facility] [address] )  G20                   Subjective: Patient arrives with portable O2 at 3L and SPC  No issues upon arrival to today's session  Objective: See treatment diary below    GAIT BELT @ START OF TREATMENT    Vitals  - SPO2: 97% on 3L of O2    NMR (1 5# wrist weights, 3# ankle weights donned start of session)  1  Large amplitude reaching from floor to ceiling - 10 reps, dynadisc on chair  2  Large amplitude reaching from side to side - 10 reps B/L, dynadisc on chair  3  Standing large amplitude forward step  - 10 reps bilaterally w/ gait belt and CGA (SpO2: 94% on 3L O2)  4  Standing austin amplitude side step - 10 reps bilaterally w/ gait belt and CGA  6  Standing large amplitude backwards step - 10 reps bilaterally w/ gait belt and CGA (SpO2: 94% on 3L O2)  7  Standing weight shift rock fwd and back 10 reps R and L with SPC  8  Sit to stand x 10; 1 airex on chair (SpO2: 92% on 3L O2)  - Ambulation to lobby, 150'     TE:   - NuStep, level 2, 5 minutes      Assessment: Patient tolerated treatment fairly today with continued focus on large amplitude movements  Patient continues to demonstrate improvements with maintenance of large amplitude movements with only minimal verbal cuing  Patient still demonstrates some difficulty maintaining increased bouts of upright activity as evidenced by need for seated rest breaks after every 2nd standing activity   Patient maintained SpO2 at or above 92% for duration of treatment  Patient will benefit from continued skilled therapy to improve endurance, strength, and balance and normalize gait for maximal daily function at home and in community  Plan: Continue per plan of care  Outcome Measures Initial Eval  1-5-22 Daily Note PN  2/8/22      5xSTS 16 87 sec, from 1 foam pad 2 UE asssit  16 27 sec from 1 foam pad 2 UE asssit      TUG  - Regular  - Cognitive  - Carry   18 92 sec  21 00 sec  Defer    - 15 64 sec w/ SPC  - 16 53 sec w/ SPC  - 16 69 sec w/ SPC      MALDONADO Defer/56  22/56      miniBEST Defer/28  defer      10 meter Defer 0 54 m/s 0 43 m/s      2MWT Defer ft 150 ft Performed 3 MWT      ABC Defer% 71 13% 76 3%      3 MWT   220 ft                    Precautions: Requires O2 via nasal canula, fall risk  Past Medical History:   Diagnosis Date    Anesthesia complication     Difficult to wake up    Arthritis     BPH (benign prostatic hyperplasia)     urinary frequency    Cancer (HCC)     basal cell neck, face    COPD (chronic obstructive pulmonary disease) (HCC)     COPD exacerbation ([facility] [address] ) 12/29/2019    Full dentures     Hiatal hernia     History of methicillin resistant staphylococcus aureus (MRSA)     10/11/2018 MRSA (nares) positive    Hypertension     Irritable bowel syndrome     Kidney stone     at least 7 episodes    Liver disease     Alpha 1- enzyme deficiency - diagnosed 2002   has been on weekly replacement therapy since then    Pulmonary emphysema ([address] )     1/25/15  FEV1 - 2 45 liters or 59% of predicted    RSV infection 12/2017    Wears glasses     for driving only

## 2022-04-01 ENCOUNTER — OFFICE VISIT (OUTPATIENT)
Dept: FAMILY MEDICINE CLINIC | Facility: CLINIC | Age: 77
End: 2022-04-01
Payer: MEDICARE

## 2022-04-01 VITALS
OXYGEN SATURATION: 95 % | HEIGHT: 77 IN | BODY MASS INDEX: 20.19 KG/M2 | TEMPERATURE: 97.9 F | SYSTOLIC BLOOD PRESSURE: 156 MMHG | WEIGHT: 171 LBS | RESPIRATION RATE: 20 BRPM | HEART RATE: 86 BPM | DIASTOLIC BLOOD PRESSURE: 84 MMHG

## 2022-04-01 DIAGNOSIS — Z02.4 DRIVER'S PERMIT PHYSICAL EXAMINATION: Primary | ICD-10-CM

## 2022-04-01 DIAGNOSIS — Z12.12 SCREENING FOR COLORECTAL CANCER: ICD-10-CM

## 2022-04-01 DIAGNOSIS — G20 PARKINSON'S DISEASE (HCC): ICD-10-CM

## 2022-04-01 DIAGNOSIS — Z12.11 SCREENING FOR COLORECTAL CANCER: ICD-10-CM

## 2022-04-01 PROBLEM — M79.604 PAIN OF RIGHT LOWER EXTREMITY: Status: RESOLVED | Noted: 2021-02-23 | Resolved: 2022-04-01

## 2022-04-01 PROBLEM — G57.70 CAUSALGIA OF LOWER LIMB: Status: RESOLVED | Noted: 2017-12-30 | Resolved: 2022-04-01

## 2022-04-01 PROBLEM — J96.20 ACUTE ON CHRONIC RESPIRATORY FAILURE (HCC): Status: RESOLVED | Noted: 2017-12-30 | Resolved: 2022-04-01

## 2022-04-01 PROBLEM — R63.0 LOSS OF APPETITE: Status: RESOLVED | Noted: 2021-04-06 | Resolved: 2022-04-01

## 2022-04-01 PROBLEM — R63.4 WEIGHT LOSS, UNINTENTIONAL: Status: RESOLVED | Noted: 2021-04-06 | Resolved: 2022-04-01

## 2022-04-01 PROBLEM — M79.605 PAIN AND SWELLING OF LEFT LOWER EXTREMITY: Status: RESOLVED | Noted: 2019-05-30 | Resolved: 2022-04-01

## 2022-04-01 PROBLEM — R53.1 WEAKNESS GENERALIZED: Status: RESOLVED | Noted: 2018-10-11 | Resolved: 2022-04-01

## 2022-04-01 PROBLEM — K11.20 SIALADENITIS: Status: RESOLVED | Noted: 2020-11-02 | Resolved: 2022-04-01

## 2022-04-01 PROBLEM — L98.9 SKIN LESION OF RIGHT LOWER EXTREMITY: Status: RESOLVED | Noted: 2018-12-13 | Resolved: 2022-04-01

## 2022-04-01 PROBLEM — J43.2 CENTRILOBULAR EMPHYSEMA (HCC): Chronic | Status: RESOLVED | Noted: 2018-04-02 | Resolved: 2022-04-01

## 2022-04-01 PROBLEM — R45.89 DYSPHORIC MOOD: Status: RESOLVED | Noted: 2020-11-10 | Resolved: 2022-04-01

## 2022-04-01 PROBLEM — R13.10 DYSPHAGIA: Status: RESOLVED | Noted: 2019-12-16 | Resolved: 2022-04-01

## 2022-04-01 PROBLEM — R26.81 UNSTEADY GAIT: Chronic | Status: RESOLVED | Noted: 2020-12-08 | Resolved: 2022-04-01

## 2022-04-01 PROBLEM — M79.89 LEFT LEG SWELLING: Status: RESOLVED | Noted: 2020-05-05 | Resolved: 2022-04-01

## 2022-04-01 PROBLEM — H61.20 CERUMEN IMPACTION: Status: RESOLVED | Noted: 2019-09-03 | Resolved: 2022-04-01

## 2022-04-01 PROBLEM — R26.2 AMBULATORY DYSFUNCTION: Status: RESOLVED | Noted: 2019-12-16 | Resolved: 2022-04-01

## 2022-04-01 PROBLEM — R07.9 CHEST PAIN AT REST: Status: RESOLVED | Noted: 2018-02-13 | Resolved: 2022-04-01

## 2022-04-01 PROBLEM — R55 SYNCOPE: Status: RESOLVED | Noted: 2020-09-13 | Resolved: 2022-04-01

## 2022-04-01 PROBLEM — R74.01 TRANSAMINITIS: Status: RESOLVED | Noted: 2019-12-03 | Resolved: 2022-04-01

## 2022-04-01 PROBLEM — R42 LIGHTHEADEDNESS: Status: RESOLVED | Noted: 2018-05-24 | Resolved: 2022-04-01

## 2022-04-01 PROBLEM — N17.9 AKI (ACUTE KIDNEY INJURY) (HCC): Status: RESOLVED | Noted: 2018-03-30 | Resolved: 2022-04-01

## 2022-04-01 PROBLEM — R41.3 MEMORY PROBLEM: Status: RESOLVED | Noted: 2021-12-14 | Resolved: 2022-04-01

## 2022-04-01 PROBLEM — M79.89 PAIN AND SWELLING OF LEFT LOWER EXTREMITY: Status: RESOLVED | Noted: 2019-05-30 | Resolved: 2022-04-01

## 2022-04-01 PROBLEM — R60.0 BILATERAL LEG EDEMA: Status: RESOLVED | Noted: 2020-01-13 | Resolved: 2022-04-01

## 2022-04-01 PROCEDURE — 99214 OFFICE O/P EST MOD 30 MIN: CPT | Performed by: FAMILY MEDICINE

## 2022-04-01 NOTE — PROGRESS NOTES
Assessment/Plan:    Diagnoses and all orders for this visit:    's permit physical examination    Parkinson's disease ([facility] [address] )    Screening for colorectal cancer  -     Ambulatory referral for colonoscopy; Future        Did advice the patient to hold off on driving until fully evaluated by Neurology  Patient is agreeable to a fit to drive test   The patient will speak with Viviana Osorio about details of the test as well as arranging the test   Based on previous recommendations, as the patient to not drive but the patient declined and noted that he will continue to drive as he would like to go to his appointments as well as his recreation all sent are at his complex  Will defer to Neurology for further evaluation of the ability to drive  Return in about 6 months (around 10/1/2022) for Annual physical, awv  Subjective:   71-year-old gentleman with multiple medical illnesses most pertinent Parkinson's disease who presented to the clinic today for 's license re-evaluation  The patient was recently seen by Neurology who recommended a fit to drive test which the patient was hesitant about completing as he was worried about losing his license  The patient continues to drive in his complex that he lives in in addition to driving to his appointments only  Patient noted that he would like to continue drive  He reports improvement with occupational therapy with respect to his movement and reaction time  The following portions of the patient's history were reviewed and updated as appropriate: allergies, current medications, past family history, past medical history, past social history, past surgical history and problem list     Review of Systems   All other systems reviewed and are negative      Objective:    /84 (BP Location: Left arm, Patient Position: Sitting, Cuff Size: Adult)   Pulse 86   Temp 97 9 °F (36 6 °C) (Tympanic)   Resp 20   Ht 6' 5" (1 956 m)   Wt 77 6 kg (171 lb)   SpO2 95% Comment: 02 @ 3 lpm via n/c  BMI 20 28 kg/m²     Physical Exam  Vitals reviewed  Constitutional:       General: He is not in acute distress  Appearance: He is well-developed  He is not diaphoretic  HENT:      Head: Normocephalic and atraumatic  Nose: Nose normal    Eyes:      Conjunctiva/sclera: Conjunctivae normal       Pupils: Pupils are equal, round, and reactive to light  Cardiovascular:      Rate and Rhythm: Normal rate and regular rhythm  Heart sounds: Normal heart sounds  No murmur heard  No friction rub  No gallop  Pulmonary:      Effort: Pulmonary effort is normal  No respiratory distress  Breath sounds: Normal breath sounds  No wheezing  Comments: Currently on 2 L at rest  Very diminished breath sounds bilaterally with faint crackles on the left lower lung bases  Abdominal:      General: Bowel sounds are normal  There is no distension  Palpations: Abdomen is soft  Tenderness: There is no abdominal tenderness  Musculoskeletal:         General: Normal range of motion  Cervical back: Normal range of motion and neck supple  Skin:     General: Skin is warm and dry  Findings: No erythema or rash  Neurological:      Mental Status: He is alert and oriented to person, place, and time         Fransisco Long MD  04/01/22  9:55 AM

## 2022-04-04 ENCOUNTER — PATIENT OUTREACH (OUTPATIENT)
Dept: FAMILY MEDICINE CLINIC | Facility: CLINIC | Age: 77
End: 2022-04-04

## 2022-04-04 ENCOUNTER — OFFICE VISIT (OUTPATIENT)
Dept: PHYSICAL THERAPY | Facility: CLINIC | Age: 77
End: 2022-04-04
Payer: COMMERCIAL

## 2022-04-04 ENCOUNTER — TELEPHONE (OUTPATIENT)
Dept: PALLIATIVE MEDICINE | Facility: CLINIC | Age: 77
End: 2022-04-04

## 2022-04-04 ENCOUNTER — OFFICE VISIT (OUTPATIENT)
Dept: OCCUPATIONAL THERAPY | Facility: CLINIC | Age: 77
End: 2022-04-04
Payer: COMMERCIAL

## 2022-04-04 DIAGNOSIS — G20 PARKINSON'S DISEASE (HCC): Primary | ICD-10-CM

## 2022-04-04 DIAGNOSIS — R41.3 MEMORY PROBLEM: Primary | ICD-10-CM

## 2022-04-04 PROCEDURE — 97112 NEUROMUSCULAR REEDUCATION: CPT | Performed by: PHYSICAL THERAPIST

## 2022-04-04 PROCEDURE — 97530 THERAPEUTIC ACTIVITIES: CPT | Performed by: OCCUPATIONAL THERAPIST

## 2022-04-04 PROCEDURE — 97112 NEUROMUSCULAR REEDUCATION: CPT | Performed by: OCCUPATIONAL THERAPIST

## 2022-04-04 NOTE — TELEPHONE ENCOUNTER
I spoke with patient and he informed me that his PCP Dr Ina Sage recommended that he not continue driving  I did speak with Quintin Jovi care coordinator ( [phone] ) that is working on trying to get transportation worked out  for this patient for moving forward  I did get a [facility] approved by our manager  for this patient for his apt tomorrow with Dr Makayla Vance at [facility] 4/4/2022

## 2022-04-04 NOTE — PROGRESS NOTES
Daily Note     Today's date: 2022  Patient name: Rocklin Cushing  : 1945  MRN: [id number]  Referring provider: Davis Coe MD  Dx:   Encounter Diagnosis   Name Primary?  Memory problem Yes       Start Time: 1050  Stop Time: 1145  Total time in clinic (min): 55 minutes    Precautions: walk to front, anxiety, hx of difficulty with reading and spelling, vision impairment- needs large print  Visit 3 of 10   PN due 22  POC valid 22    Subjective: "I don't want anything to do with this place  This is bullsh*t " (pt reports that his new doctor told him not to drive)      Objective: See treatment below  NMRE:  9 HOLE PEG TEST: performed 9 hole peg test to assess dexterity/fine motor coordination   R: 29 2 seconds (previously 36 0 seconds)  L: 31 9 seconds (previously 41 3 seconds)  Pt demonstrates decreased [address] compared to norms for age/sex (~26 seconds)     Functional Dexterity Test:  R: 35 3 seconds (previosly 40 8 seconds, 1 drop)  L: 33 6 seconds (previously 45 7 seconds)  Pt demonstrates decreased B dexterity compared to average scores for his age/sex (31 seconds dominant hand, 38 seconds non-dominant hand)    Therapeutic Activities:  -Pt re-educated on option of completing FTD assessment as he was upset that his doctor instructed him not to drive      -Double spot with writing an item from a category according to color of disk completed to address memory,  skills, [address] for ADLs/IADLs  Requires min cues for naming component and errors in color about 20% of the time     -Deductive reasoning logical problems worksheet (dog type/owner) completed to address EF  Pt required max cues to complete  Assessment: Tolerated treatment well  Pt able to be redirected from FTD assessment with min cues today  Will continue to progress pt as tolerated  Plan: Continued skilled OT per POC

## 2022-04-04 NOTE — PROGRESS NOTES
Daily Note   RE 22  POC 3/30/22    Insurance:  AMA/CMS Eval/ Re-eval POC expires Gabriel La #/ Referral # Total units  Start date  Expiration date Extension  Visit limitation? PT only or  PT+OT? Co-Insurance   CMS 1/5/ 22 3-30-22  Not required NA NA NA NA BOMN PT Yes, $0 copay                                                                 Today's date: 2022  Patient name: Erin Sethi  : 1945  MRN: [id number]  Referring provider: Beny Daly MD  Dx:   Encounter Diagnosis     ICD-10-CM    1  Parkinson's disease ([facility] [address] )  G20                   Subjective: Patient arrives from OT session with portable O2 at 3L and SPC  No issues upon arrival to today's session  Objective: See treatment diary below    GAIT BELT @ START OF TREATMENT    Vitals  - SPO2: 97% on 3L of O2    NMR (2# wrist weights, 3# ankle weights donned start of session)  1  Large amplitude reaching from floor to ceiling - 10 reps, dynadisc on chair  2  Large amplitude reaching from side to side - 10 reps B/L, dynadisc on chair  3  Standing large amplitude forward step  - 10 reps bilaterally w/ gait belt and CGA (SpO2: 90% on 3L O2) - attempted stepping over 6" karen but removed after 5 reps  4  Standing [place] amplitude side step - 10 reps bilaterally w/ gait belt and CGA  6  Standing large amplitude backwards step - 10 reps bilaterally w/ gait belt and CGA (SpO2: 92% on 3L O2)  7  Standing weight shift rock fwd and back 10 reps R and L with SPC  8  Sit to stand x 10; 1 airex on chair (SpO2: 93% on 3L O2)  - Ambulation to lobby, 150'     TE:   - NuStep, level 2, 5 minutes      Assessment: Patient tolerated treatment fairly today with continued focus on large amplitude movements  Progressed resistance of wrist weights to 2# with fair patient tolerance  Patient demonstrated increased fatigue and dyspnea on exertion compared to previous session   Patient continues to require frequent seated rest breaks as tolerance for upright activity remains limited  Patient required more frequent verbal cuing to encourage large amplitude UE movements  Attempted to add 6" karen to forward steps to encourage larger amplitude step but patient struggled with balance and inability to consistently clear cone  During STS activity patient required unilateral UE to push off and to control descent, indicating impaired quad strength/muscular endurance  Patient will benefit from continued skilled therapy to improve endurance, strength, and balance and normalize gait for maximal daily function at home and in community  Plan: Continue per plan of care  Outcome Measures Initial Eval  1-5-22 Daily Note PN  2/8/22      5xSTS 16 87 sec, from 1 foam pad 2 UE asssit  16 27 sec from 1 foam pad 2 UE asssit      TUG  - Regular  - Cognitive  - Carry   18 92 sec  21 00 sec  Defer    - 15 64 sec w/ SPC  - 16 53 sec w/ SPC  - 16 69 sec w/ SPC      MALDONADO Defer/56  22/56      miniBEST Defer/28  defer      10 meter Defer 0 54 m/s 0 43 m/s      2MWT Defer ft 150 ft Performed 3 MWT      ABC Defer% 71 13% 76 3%      3 MWT   220 ft                    Precautions: Requires O2 via nasal canula, fall risk  Past Medical History:   Diagnosis Date    Anesthesia complication     Difficult to wake up    Arthritis     BPH (benign prostatic hyperplasia)     urinary frequency    Cancer (HCC)     basal cell neck, face    COPD (chronic obstructive pulmonary disease) (HCC)     COPD exacerbation ([facility] [address] ) 12/29/2019    Full dentures     Hiatal hernia     History of methicillin resistant staphylococcus aureus (MRSA)     10/11/2018 MRSA (nares) positive    Hypertension     Irritable bowel syndrome     Kidney stone     at least 7 episodes    Liver disease     Alpha 1- enzyme deficiency - diagnosed 2002   has been on weekly replacement therapy since then    Pulmonary emphysema ([facility] [address] )     1/25/15  FEV1 - 2 45 liters or 59% of predicted    RSV infection 12/2017    Wears glasses     for driving only

## 2022-04-04 NOTE — PROGRESS NOTES
Received phone call from patient  Concerned as to how he will get to palliative care appointment with Dr Pina Morrow tomorrow  Advised patient that CM will discuss with Dr Banerjee Speaker  Received phone call from [address] at palliative care office  States she spoke to patient to confirm his appointment for tomorrow  Pt gave Gopi Jonesist phone number  Discussed with Carol patients ability to drive  Requested that she arrange lyft ride for patient  States she will discuss with her manager to get approval  Will call CM with any questions or concerns  TT to Dr Banerjee Speaker to discuss

## 2022-04-05 ENCOUNTER — TELEPHONE (OUTPATIENT)
Dept: FAMILY MEDICINE CLINIC | Facility: CLINIC | Age: 77
End: 2022-04-05

## 2022-04-05 ENCOUNTER — PATIENT OUTREACH (OUTPATIENT)
Dept: FAMILY MEDICINE CLINIC | Facility: CLINIC | Age: 77
End: 2022-04-05

## 2022-04-05 ENCOUNTER — OFFICE VISIT (OUTPATIENT)
Dept: PALLIATIVE MEDICINE | Facility: CLINIC | Age: 77
End: 2022-04-05
Payer: MEDICARE

## 2022-04-05 VITALS
SYSTOLIC BLOOD PRESSURE: 159 MMHG | RESPIRATION RATE: 18 BRPM | BODY MASS INDEX: 21.39 KG/M2 | TEMPERATURE: 97.8 F | DIASTOLIC BLOOD PRESSURE: 78 MMHG | WEIGHT: 180.4 LBS | OXYGEN SATURATION: 97 % | HEART RATE: 80 BPM

## 2022-04-05 DIAGNOSIS — I87.2 CHRONIC VENOUS INSUFFICIENCY: ICD-10-CM

## 2022-04-05 DIAGNOSIS — I27.20 PULMONARY HYPERTENSION (HCC): ICD-10-CM

## 2022-04-05 DIAGNOSIS — J96.11 CHRONIC RESPIRATORY FAILURE WITH HYPOXIA (HCC): Chronic | ICD-10-CM

## 2022-04-05 DIAGNOSIS — E88.01 AAT (ALPHA-1-ANTITRYPSIN) DEFICIENCY (HCC): Chronic | ICD-10-CM

## 2022-04-05 DIAGNOSIS — F33.9 DEPRESSION, RECURRENT (HCC): ICD-10-CM

## 2022-04-05 DIAGNOSIS — K21.9 GASTROESOPHAGEAL REFLUX DISEASE, UNSPECIFIED WHETHER ESOPHAGITIS PRESENT: ICD-10-CM

## 2022-04-05 DIAGNOSIS — G47.00 INSOMNIA, UNSPECIFIED TYPE: ICD-10-CM

## 2022-04-05 DIAGNOSIS — R41.89 COGNITIVE IMPAIRMENT: ICD-10-CM

## 2022-04-05 DIAGNOSIS — Z51.5 PALLIATIVE CARE PATIENT: ICD-10-CM

## 2022-04-05 DIAGNOSIS — F41.9 ANXIOUSNESS: ICD-10-CM

## 2022-04-05 DIAGNOSIS — G20 PARKINSON'S DISEASE (HCC): ICD-10-CM

## 2022-04-05 DIAGNOSIS — R26.2 AMBULATORY DYSFUNCTION: ICD-10-CM

## 2022-04-05 DIAGNOSIS — J43.2 CENTRILOBULAR EMPHYSEMA (HCC): Primary | Chronic | ICD-10-CM

## 2022-04-05 PROCEDURE — 99213 OFFICE O/P EST LOW 20 MIN: CPT | Performed by: INTERNAL MEDICINE

## 2022-04-05 NOTE — TELEPHONE ENCOUNTER
This is a late entry for a conversation with the daughter on 04/04/2022  Called to speak to the daughter that the patient was recommended to no longer drive and would require assistance with transportation  The daughter noted that she is not able to assist with his transportation  Did inform the daughter that there may be some programs that can assist the patient with transportation but will require financial support  Also advised the daughter that the patient should not be driving  Offered assistance to the daughter such as FMLA to assist with taking time off from work in order to assist the patient with transportation but the daughter declined  Will speak with Huong Pacheco about transportation programs

## 2022-04-05 NOTE — PROGRESS NOTES
Follow-up with Palliative and Supportive Care  Jesus Ortega 65 y o  male [id number]    ASSESSMENT & PLAN:  1  Centrilobular emphysema ([facility] [address] )    2  Chronic respiratory failure with hypoxia (HCC)    3  AAT (alpha-1-antitrypsin) deficiency ([facility] )    4  Pulmonary hypertension ([facility] )    5  Parkinson's disease ([facility] )    6  Depression, recurrent ([facility] )    7  Chronic venous insufficiency    8  Gastroesophageal reflux disease, unspecified whether esophagitis present    9  Anxiousness    10  Insomnia, unspecified type    11  Cognitive impairment    12  Palliative care patient    13  Ambulatory dysfunction           Patient is focused on his driving and the fact he has been told that it is not safe for him to drive  He is complying but w/o driving he is frustrated, "aggravated", isolated, and depressed  o Ongoing issues w/ gait, short-term memory, cognitive processing; [facility] 19/30 on 2/24/22 (mild cognitive impairment), increased from 15/30   Patient declines offer of change to [facility] regimen  Continue buspirone, lorazepam, mirtazapine   Respiratory status stable  Continue supplemental O2   Again encouraged Rollator use over cane; patient feels the cane is sufficient   Patient has stated on multiple occasions that he does not wish to engage in ACP  He has states he is comfortable w/ his daughter Suly Revering making decisions for him  He wishes to continue disease-directed cares   Patient has received [address] vaccinations   Reviewed notes (FM, PT, OT, Care Coordination, Pulmonology, Neurology), labs (11/18/21: Cr 1 12, eGFR 63, Hb 12 5; alb 4 2 on 11/17/21), imaging (1/10/22 CT lung screening)   Return in about 3 months (around 7/5/2022)   Emotional support provided   Medication safety issues addressed - no driving under the influence of narcotics, watch for adverse effects including AMS and respiratory depression, keep medications stored in a safe/locked environment        Requested Prescriptions      No prescriptions requested or ordered in this encounter       There are no discontinued medications  Representatives have queried the patient's controlled substance dispensing history in the Prescription Drug Monitoring Program in compliance with regulations before I have prescribed any controlled substances  The prescription history is consistent with prescribed therapy and our practice policies  30+ minutes were spent in this ambulatory visit with greater than 50% of the time spent face to face with patient in counseling or coordination of care including discussions of symptom assessment and management, medication review, psychosocial support, chart review, imaging review, lab review, goals of care and supportive listening  All of the patient's questions were answered during this discussion  SUBJECTIVE:  Chief Complaint   Patient presents with    COPD    Parkinson's Disease    Memory Loss    Gait Problem    Shortness of Breath    Insomnia    Anxiety    Depression    Counseling        HPI    Diana Rodriguez is a 65 y o  male w/ severe emphysema w/ alpha-1-antitrypsin deficiency, chronic hypoxic respiratory failure on 3LPM O2, pulmonary hypertension w/ chronic PE, Parkinsons disease, former tobacco dependence (60 pack-years, quit 2017), OA, BPH, IBS, idiopathic neuropathy, h/o BCC of the face and neck, anxiousness, insomnia  He follows w/ Benjie Del Toro PA-C / Dr Cheryl Joshi (Pulmonology), Dr Joey Zimmerman (Cardiology), Dr Brandee Padilla (Sleep Medicine)  Patient is known to [facility] clinic; last seen by me 2/8/22 for symptom management, psychosocial support  As expected, patient has been quite demonstrative in his desire to continue to drive and remain independent despite decreased reaction time, decreased memory and concentration (19/30 MOCA score), lightheadedness   On 3/17/22 OT documented:  pt perseverated throughout session on driving and FTD- provided information and educated on scoring, purpose; after session, pt reports he would kill himself if he didn't pass assessment- PT notified this therapist PT Anita Brito provded pt with suicide hotline, and this therapist notified PCP of  Suicidal however pt reports he does not have a plan    Per Neurology note on that some day:  He's not likely to get FTD done because he doesn't want to be told not to drive  We just put it in documentation and tell daughter that he can not drive due to very slow reaction time and some memory impairment  She will need to take keys away    Patient endorses dysphoric mood today, along w/ aggravation and frustration, but denies suicidal ideation  He remains focused on his desire to drive which symbolizes his independence  He does recognize that he has physical limitations including WALKER, but does not acknowledge any issues w/ driving or cognition  He feels PT has been helpful for his ambulation and exercise tolerance  Patient reports his respiratory status is unchanged  He is sad; he states he was told there is nothing to be done to improve his breathing, that "this is what it's going to be"  He accepts counseling on the nature of severe emphysema  He endorses decreased appetite  He denies N/V  He notes some bowel urgency w/ occasional incontinence  PDMP shows no concerns  The following portions of the medical history were reviewed: past medical history, surgical history, problem list, medication list, family history, and social history        Current Outpatient Medications:     amLODIPine (NORVASC) 10 mg tablet, TAKE ONE TABLET BY MOUTH DAILY , Disp: 30 tablet, Rfl: 6    budesonide (PULMICORT) 0 5 mg/2 mL nebulizer solution, Take 2 mL (0 5 mg total) by nebulization 2 (two) times a day, Disp: 360 mL, Rfl: 11    busPIRone (BUSPAR) 10 mg tablet, TAKE ONE TABLET BY MOUTH THREE TIMES DAILY , Disp: 90 tablet, Rfl: 3    carbamide peroxide (DEBROX) 6 5 % otic solution, Administer 5 drops into both ears 2 (two) times a day as needed for ear pain, Disp: 15 mL, Rfl: 0    carbidopa-levodopa (SINEMET)  mg per tablet, TAKE HALF TABLET BY MOUTH 30 minutes prior to breakfast, lunch, and dinner for 1 week  then increase to 1 tablet prior to each meal, Disp: 90 tablet, Rfl: 0    cholestyramine (QUESTRAN) 4 g packet, Take 1 packet by mouth daily, Disp: , Rfl:     doxylamine (Unisom SleepTabs) 25 MG tablet, Take 25 mg by mouth daily at bedtime as needed for sleep, Disp: , Rfl:     finasteride (PROSCAR) 5 mg tablet, TAKE ONE TABLET BY MOUTH IN THE MORNING , Disp: 90 tablet, Rfl: 3    fluticasone (FLONASE) 50 mcg/act nasal spray, 2 sprays into each nostril daily, Disp: 16 g, Rfl: 5    formoterol (Perforomist) 20 MCG/2ML nebulizer solution, Take 2 mL (20 mcg total) by nebulization 2 (two) times a day, Disp: 360 mL, Rfl: 11    gabapentin (NEURONTIN) 300 mg capsule, TAKE ONE CAPSULE BY MOUTH THREE TIMES DAILY, Disp: 90 capsule, Rfl: 0    ipratropium-albuterol (DUO-NEB) 0 5-2 5 mg/3 mL nebulizer solution, Take 3 mL by nebulization 4 (four) times a day, Disp: 360 mL, Rfl: 5    LORazepam (ATIVAN) 1 mg tablet, , Disp: , Rfl:     midodrine (PROAMATINE) 10 MG tablet, Take 1 tab three times daily   Please hold medication if blood pressure is above 839 systolic , Disp: 90 tablet, Rfl: 4    mirtazapine (REMERON) 15 mg tablet, Take 1 tablet (15 mg total) by mouth daily at bedtime, Disp: 30 tablet, Rfl: 5    OXYGEN-HELIUM IN, Inhale 2L at rest- 3L with activity, Disp: , Rfl:     pantoprazole (PROTONIX) 40 mg tablet, TAKE ONE TABLET BY MOUTH TWICE DAILY, Disp: 180 tablet, Rfl: 0    tamsulosin (FLOMAX) 0 4 mg, TAKE ONE CAPSULE BY MOUTH DAILY, Disp: 90 capsule, Rfl: 0    Ventolin  (90 Base) MCG/ACT inhaler, Inhale 2 puffs every 4 (four) hours as needed for wheezing, Disp: 18 g, Rfl: 5    ZEMAIRA infusion, once a week , Disp: , Rfl:     Diclofenac Sodium (VOLTAREN) 1 %, Apply 2 g topically 4 (four) times a day for 7 days Apply to R knee, Disp: 56 g, Rfl: 0    Review of Systems   Constitutional: Positive for activity change, appetite change and fatigue  HENT: Negative for trouble swallowing  Eyes: Negative for pain and redness  Respiratory: Positive for chest tightness and shortness of breath  Gastrointestinal: Negative for abdominal pain, nausea and vomiting  Occasional bowel urgency  Endocrine: Negative for polydipsia and polyphagia  Musculoskeletal: Positive for gait problem  Skin: Positive for pallor  Allergic/Immunologic: Positive for immunocompromised state  Neurological: Positive for weakness  Negative for facial asymmetry and speech difficulty  Neuropathy  Psychiatric/Behavioral: Positive for dysphoric mood and sleep disturbance  Decreased concentration: patient denies  The patient is nervous/anxious  OBJECTIVE:  /78 (BP Location: Right arm, Patient Position: Sitting, Cuff Size: Standard)   Pulse 80   Temp 97 8 °F (36 6 °C) (Temporal)   Resp 18   Wt 81 8 kg (180 lb 6 4 oz)   SpO2 97%   BMI 21 39 kg/m²   Physical Exam  Vitals reviewed  Constitutional:       General: He is not in acute distress  Appearance: He is cachectic  He is ill-appearing (chronically)  He is not toxic-appearing  Interventions: Nasal cannula in place  Comments: Frail and debilitated  HENT:      Head: Normocephalic and atraumatic  Right Ear: External ear normal       Left Ear: External ear normal    Eyes:      General: No scleral icterus  Right eye: No discharge  Left eye: No discharge  Extraocular Movements: Extraocular movements intact  Conjunctiva/sclera: Conjunctivae normal       Pupils: Pupils are equal, round, and reactive to light  Cardiovascular:      Rate and Rhythm: Normal rate  Pulmonary:      Effort: Pulmonary effort is normal  No tachypnea, bradypnea, accessory muscle usage or respiratory distress  Comments: Receiving O2 from concentrator via NC    Abdominal: General: There is no distension  Tenderness: There is no guarding  Musculoskeletal:      Cervical back: Normal range of motion  Right lower leg: No edema  Left lower leg: No edema  Skin:     General: Skin is dry  Coloration: Skin is pale  Neurological:      Mental Status: He is alert and oriented to person, place, and time  Cranial Nerves: No dysarthria or facial asymmetry  Gait: Gait abnormal (using SPC)  Psychiatric:         Attention and Perception: Attention normal          Mood and Affect: Mood is anxious and depressed  Affect is not inappropriate  Speech: Speech normal          Behavior: Behavior normal  Behavior is cooperative  Thought Content: Thought content normal  Thought content does not include suicidal ideation  Thought content does not include suicidal plan  Alida Yoder MD  [facility] Palliative and Supportive Care      Portions of this document may have been created using dictation software and as such some "sound alike" terms may have been generated by the system  Do not hesitate to contact me with any questions or clarifications  This note was not shared with the patient due to reasonable likelihood of causing patient harm

## 2022-04-05 NOTE — PROGRESS NOTES
Follow up discussion with Dr Justin Sutton did reach out to patients daughter  CM sent email to daughter [place] advising that Shoshana Kumar does not come to [place]  Advised that Blake Us had practices closer to patients home  In basket message sent to OT  Dr Justin Sutton placed order for Fit to [address]  Moving forward, concerns related to patients driving will be deferred to neurology

## 2022-04-05 NOTE — PATIENT INSTRUCTIONS
It was good to see you today  Thank you for coming in  · Consider scheduling bathroom trips - after each meal, for example - to avoid bowel urgency  · No medication changes today  · Return in about 3 months  · Call us for refills on medications that we supply, as needed  · If something changes and you need to come in sooner, please call our office  PRESCRIPTION REFILL REMINDER:  All medication refills should be requested prior to [facility] on Friday  Any refill requests after noon on Friday would be addressed the following Monday

## 2022-04-07 ENCOUNTER — OFFICE VISIT (OUTPATIENT)
Dept: PHYSICAL THERAPY | Facility: CLINIC | Age: 77
End: 2022-04-07
Payer: COMMERCIAL

## 2022-04-07 ENCOUNTER — OFFICE VISIT (OUTPATIENT)
Dept: OCCUPATIONAL THERAPY | Facility: CLINIC | Age: 77
End: 2022-04-07
Payer: COMMERCIAL

## 2022-04-07 DIAGNOSIS — G20 PARKINSON'S DISEASE (HCC): Primary | ICD-10-CM

## 2022-04-07 DIAGNOSIS — G20 PARKINSON DISEASE (HCC): ICD-10-CM

## 2022-04-07 DIAGNOSIS — R41.3 MEMORY PROBLEM: Primary | ICD-10-CM

## 2022-04-07 PROCEDURE — 97530 THERAPEUTIC ACTIVITIES: CPT | Performed by: OCCUPATIONAL THERAPIST

## 2022-04-07 PROCEDURE — 97112 NEUROMUSCULAR REEDUCATION: CPT

## 2022-04-07 PROCEDURE — 97110 THERAPEUTIC EXERCISES: CPT

## 2022-04-07 NOTE — PROGRESS NOTES
Daily Note   RE 3/17/22  POC 22    Insurance:  AMA/CMS Eval/ Re-eval POC expires Vanesa Guzman #/ Referral # Total units  Start date  Expiration date Extension  Visit limitation? PT only or  PT+OT? Co-Insurance   CMS 1/5/ 22 3-30-22  Not required NA NA NA NA BOMN PT Yes, $0 copay    3/17 6/9/22                                                            Today's date: 2022  Patient name: Marielos Marshall  : 1945  MRN: [id number]  Referring provider: Jaron Rodrigez MD  Dx:   Encounter Diagnosis     ICD-10-CM    1  Parkinson's disease ([facility] [address] )  G20                   Subjective: Patient arrives from OT session with portable O2 at 3L and SPC  Patient reports he feels weak and lightheaded today  Objective: See treatment diary below    GAIT BELT @ START OF TREATMENT    Vitals  - SPO2: 95% on 3L of O2  - HR: 80 bpm   - BP: 162/92     NMR (2# wrist weights, 3# ankle weights donned start of session)  1  Large amplitude reaching from floor to ceiling - 10 reps, dynadisc on chair  2  Large amplitude reaching from side to side - 10 reps B/L, dynadisc on chair  3  Standing large amplitude forward step  - 10 reps bilaterally w/ gait belt and CGA   4  Standing [place] amplitude side step - 10 reps bilaterally w/ gait belt and CGA (SpO2: 94% on 3L O2)  5  Standing large amplitude backwards step - 10 reps bilaterally w/ gait belt and CGA   - Ambulation to lobby, 150'     TE:   - NuStep, level 2, 7 minutes      Assessment: Patient tolerated treatment fairly today with continued focus on large amplitude movements  Patient was able to participate in session today with increased rest breaks due to complaints of weakness  Patient demonstrated good amplitude today with standing interventions; may consider attempting adding karen again next session to further challenge  Patient had 2 near LOB when ambulating with CG with therapist, requiring min to mod A to maintain balance   Patient will benefit from continued skilled therapy to improve endurance, strength, and balance and normalize gait for maximal daily function at home and in community  Plan: Continue per plan of care  Outcome Measures Initial Eval  1-5-22 Daily Note PN  2/8/22      5xSTS 16 87 sec, from 1 foam pad 2 UE asssit  16 27 sec from 1 foam pad 2 UE asssit      TUG  - Regular  - Cognitive  - Carry   18 92 sec  21 00 sec  Defer    - 15 64 sec w/ SPC  - 16 53 sec w/ SPC  - 16 69 sec w/ SPC      MALDONADO Defer/56  22/56      miniBEST Defer/28  defer      10 meter Defer 0 54 m/s 0 43 m/s      2MWT Defer ft 150 ft Performed 3 MWT      ABC Defer% 71 13% 76 3%      3 MWT   220 ft                    Precautions: Requires O2 via nasal canula, fall risk  Past Medical History:   Diagnosis Date    Anesthesia complication     Difficult to wake up    Arthritis     BPH (benign prostatic hyperplasia)     urinary frequency    Cancer (HCC)     basal cell neck, face    COPD (chronic obstructive pulmonary disease) (HCC)     COPD exacerbation ([facility] [address] ) 12/29/2019    Full dentures     Hiatal hernia     History of methicillin resistant staphylococcus aureus (MRSA)     10/11/2018 MRSA (nares) positive    Hypertension     Irritable bowel syndrome     Kidney stone     at least 7 episodes    Liver disease     Alpha 1- enzyme deficiency - diagnosed 2002   has been on weekly replacement therapy since then    Pulmonary emphysema ([address] )     1/25/15  FEV1 - 2 45 liters or 59% of predicted    RSV infection 12/2017    Wears glasses     for driving only

## 2022-04-07 NOTE — PROGRESS NOTES
Daily Note     Today's date: 2022  Patient name: Aruna Israel  : 1945  MRN: [id number]  Referring provider: Lorelei Valencia MD  Dx:   Encounter Diagnoses   Name Primary?  Memory problem Yes    Parkinson disease ([facility] )        Start Time: 1048  Stop Time: 1145  Total time in clinic (min): 57 minutes    Precautions: walk to front, anxiety, hx of difficulty with reading and spelling, vision impairment- needs large print  Visit 3 of 10   PN due 22  POC valid 22    Subjective: "I feel like I'm going to pass out every time I stand up"       Objective: See treatment below  -Vital signs checked at start of session after walking to tx room  /84, O2 91% on supplemental O2, HR 95 BPM  After 5 minute seated rest rechecked and /92, O2 99% on supplemental O2, HR 82 BPM and pt stated symptoms were resolving  Notified PT of pt's vital signs as he's scheduled for PT after OT session   -Spot It 2 cards focusing on sustained attention x20 rounds   -Color code x5 rounds focusing on  skills, problem solving and mental manipulation  Requires max cues and extra time to complete willy level design    -Delayed visual recall of 5 items 5/5   -Logical solutions directional words worksheets completed to address direction following  Pt required mod cues to complete correctly  -Deductive reasoning puzzles (2 rows) completed to address EF  Required mod cues to complete  Assessment: Tolerated treatment fairly  Pt demonstrated difficulty with  skills, deductive reasoning, error ID and correction  Will continue to progress pt as tolerated  Plan: Continued skilled OT per POC

## 2022-04-12 ENCOUNTER — APPOINTMENT (OUTPATIENT)
Dept: OCCUPATIONAL THERAPY | Facility: CLINIC | Age: 77
End: 2022-04-12
Payer: COMMERCIAL

## 2022-04-12 ENCOUNTER — APPOINTMENT (OUTPATIENT)
Dept: PHYSICAL THERAPY | Facility: CLINIC | Age: 77
End: 2022-04-12
Payer: COMMERCIAL

## 2022-04-14 ENCOUNTER — APPOINTMENT (OUTPATIENT)
Dept: OCCUPATIONAL THERAPY | Facility: CLINIC | Age: 77
End: 2022-04-14
Payer: COMMERCIAL

## 2022-04-14 ENCOUNTER — APPOINTMENT (OUTPATIENT)
Dept: PHYSICAL THERAPY | Facility: CLINIC | Age: 77
End: 2022-04-14
Payer: COMMERCIAL

## 2022-04-14 ENCOUNTER — TELEPHONE (OUTPATIENT)
Dept: FAMILY MEDICINE CLINIC | Facility: CLINIC | Age: 77
End: 2022-04-14

## 2022-04-14 ENCOUNTER — PATIENT OUTREACH (OUTPATIENT)
Dept: FAMILY MEDICINE CLINIC | Facility: CLINIC | Age: 77
End: 2022-04-14

## 2022-04-14 NOTE — PROGRESS NOTES
Received message from patient and from clerical staff regarding [address] transportation cancelling his rides to PT at [address] [facility]  CM was advised that every other Tuesday patient cancelled services  Policy is 3 missed appointments and service is terminated  Pt did sign and acknowledge this policy  It is advised that patient missed 5 appointments  According to Juan Carlos, representative that CM spoke to, there is no recourse to get services reinstated

## 2022-04-14 NOTE — TELEPHONE ENCOUNTER
Patient got kicked off of [facility] and will need a new form of transportation every week for his PT appointment

## 2022-04-18 ENCOUNTER — PATIENT OUTREACH (OUTPATIENT)
Dept: FAMILY MEDICINE CLINIC | Facility: CLINIC | Age: 77
End: 2022-04-18

## 2022-04-18 DIAGNOSIS — Z74.8 ASSISTANCE NEEDED WITH TRANSPORTATION: Primary | ICD-10-CM

## 2022-04-18 NOTE — PROGRESS NOTES
SW had received an [facility] basket [facility]/t [address] cancelling patient's rides to PT  SWCM had completed a chart review  RN MITUL Feliciano had called [address] transport and he missed 5 appointments and policy states 3 missed appointments then services will be terminated  SWCM had called the patient via phone  SWCM left a voicemail for this patient to discuss other options  SWCM later received a call back from the patient  Patient stated [facility] PT[facility]  SWCM had informed the patient he can also use his [employer] if needed  Per chart, EMY Feliciano documented that the patient had these services  Patient stated he has not used this services  SWCM to call Calhanpatricia Johnson to inquiry about patient having services  Patient stated he lives alone  Patient stated his daughter, Rebekah Ng is his main support  Patient stated he has a cleaning service  Patient stated he has SSI  Patient stated Rebekah Ng does his grocery shopping for him  Patient stated he has issues with getting his medicine and money out of the bank  SWCM discussed with the patient waiver services  Patient denied this and said "I can function independently"  Patient denied housing concerns at this time  SW informed the patient he would call Rebekah Ng to see what else we can do to ensure he has transportation  Patient agreed to [facility] calling her  Patient told SW have Tawnya Martinez call him about his "depends" that he needs them  SW agreed to give message to Tawnya Martinez  Patient denied any other needs  SW provided the patient her contact info  SWCM encouraged patient reach out for additional needs  Patient agreed to do so  SW had called Rebekah Ng and left her a voicemail  SW had routed note to Tawnya Martinez  SWCM placed patient as surveillance  SW will continue to be available  Addendum:    [facility] had called Isabel Johnson and spoke with Krishan Nickerson stated there is no application on file for this patient  Krishan took down info for temporary approval  Krishan advised SW call back to confirm it was placed in the system and accepted  Krishan stated the approval is for 60 days and they will be mailing out an application for him to complete or he will lose his assistance  CARLENE thanked [employer] for his assistance  CARLENE had communicated with CHW Maritza Simeon to meet up with the patient to complete Yesenia Negrete application  GAY had also called the patient back and communicated with him the services for Yesenia RASHID informed the patient that Maritza would outreach to complete this application  Patient agreed to assistance  [facility] deleted surveillance and placed patient as socially complex  SW will continue to f/u

## 2022-04-18 NOTE — TELEPHONE ENCOUNTER
Wonderful! Thank you for that update! We will do our best! If they allow us to schedule, we will continue to do so!

## 2022-04-18 NOTE — TELEPHONE ENCOUNTER
Evelin Zuniga,     I see you reached out to patient today to discuss transportation needs  Patient called again this afternoon, needing to schedule multiple appointments with [employer] (that is scheduled thru [facility])  Depending on the person we reach at [facility], they tell us they can or cannot schedule  We were able to schedule a lyft ride to get him to PT tomorrow, biut when pt is done with PT, he has to call us, for us to call lyft and request a ride home (because they do not allow return ride scheduling until pt is done and ready)     Is there any other type of transportation service the patient may qualify for? Like logisticare or something?

## 2022-04-19 ENCOUNTER — EVALUATION (OUTPATIENT)
Dept: PHYSICAL THERAPY | Facility: CLINIC | Age: 77
End: 2022-04-19
Payer: COMMERCIAL

## 2022-04-19 ENCOUNTER — OFFICE VISIT (OUTPATIENT)
Dept: OCCUPATIONAL THERAPY | Facility: CLINIC | Age: 77
End: 2022-04-19
Payer: COMMERCIAL

## 2022-04-19 DIAGNOSIS — G20 PARKINSON DISEASE (HCC): ICD-10-CM

## 2022-04-19 DIAGNOSIS — G20 PARKINSON'S DISEASE (HCC): Primary | ICD-10-CM

## 2022-04-19 DIAGNOSIS — R41.3 MEMORY PROBLEM: Primary | ICD-10-CM

## 2022-04-19 PROCEDURE — 97530 THERAPEUTIC ACTIVITIES: CPT | Performed by: OCCUPATIONAL THERAPIST

## 2022-04-19 PROCEDURE — 97530 THERAPEUTIC ACTIVITIES: CPT

## 2022-04-19 PROCEDURE — 97112 NEUROMUSCULAR REEDUCATION: CPT | Performed by: OCCUPATIONAL THERAPIST

## 2022-04-19 NOTE — PROGRESS NOTES
Daily Note     Today's date: 2022  Patient name: Lucie Coyne  : 1945  MRN: [id number]  Referring provider: Kendra Gaspar MD  Dx:   Encounter Diagnoses   Name Primary?  Memory problem Yes    Parkinson disease ([facility] )        Start Time: 1145  Stop Time: 1230  Total time in clinic (min): 45 minutes    Precautions: walk to front, anxiety, hx of difficulty with reading and spelling, vision impairment- needs large print  Visit 3 of 10   PN due 22  POC valid 22    Subjective: "This past week has been bad  I'm very winded "      Objective: See treatment below  NMRE:  -[address] activity with 1lb wrist weights on BUE and pt completed 20 reps with each hand  Ther Act:  -Direction following worksheet completed to address sustained attention    -Orientation: oriented to month and year, not date  -Divided attention activity with [place] block puzzle and completing addition/subtraction according to puzzle piece claribel    -Maria L's schedule worksheet completed to address EF  Pt completed 4/7 items without cues/assistance  Assessment: Tolerated treatment fairly  Demonstrated difficulty with EF and continues to perseverate on returning to driving  Will continue to progress pt as tolerated  Plan: Continued skilled OT per POC

## 2022-04-19 NOTE — PROGRESS NOTES
[id number]             Insurance:  A/CMS Eval/ Re-eval POC expires Marquita Jaramillo #/ Referral # Total units  Start date  Expiration date Extension  Visit limitation? PT only or  PT+OT? Co-Insurance   CMS 1/5/ 22 3-30-22  Not required NA NA NA NA BOMN PT Yes, $0 copay    3-17 22                                                   Today's date: 2022  Patient name: Jeff Castro  : 1945  MRN: [id number]  Referring provider: Nahum Crowder MD  Dx:   Encounter Diagnosis     ICD-10-CM    1  Parkinson's disease ([facility] [address] )  G20              Assessment  Assessment details: Patient is a 65 y o  Male who has been attending skilled PT since 2022 with recent diagnosis of PD  Patient displayed relative plateau with confidence in balance and endurance as indicated by ABC scores and 3MWT performance  Endurance is limited secondary to reduced aerobic capacity and requiring supplemental O2 via NC  Patient demonstrated the following gait deficits : bradykinesia, shuffling gait, decreased reciprocal arm swing, absent trunk rotation, mild-moderate postural instability, and forward head and shoulder posture  He demonstrated significant improvements in strength/power and functional mobility evidenced by decreased 5xSTS score and TUG score > than MDC  Since last re-eval, patient demonstrated a 5-point improvement in MALDONADO score to 37/56 which indicates patient remains at HIGH risk of falls  Due to time constraints, patient did not complete 10 meter walk test which is planned to be assessed at next session  The patient has met all of his short term goals and progressing towards long term goals  At this time, he has met 1 long-term goal of scoring a low risk for falls 2/4 fall risks measures  He presents with the following symptoms that are consistent with Alan and Yahr stage 3: mild to moderate postural instability and independent   Per research provided by [employer], patient will benefit from skilled outpatient PT services to improve and maximize his function, to reduce risk for falls and potential injuries associated with falls, reduce healthcare costs via hospitalization, and reduce patient and national healthcare costs  In early stages of Parkinson's Disease, research indicates that intensive physical exercise can improve patient's motor control and assist in slowing the disease progression as a neuroprotective agent  Patient will undoubtedly benefit from continuation of skilled OPPT services to improve balance, strength, and endurance, and reduce fall risk in order to promote maximal safety and function at home and in the community  Impairments: Abnormal coordination, Abnormal gait, Abnormal or restricted ROM, Activity intolerance, Impaired balance, Impaired physical strength, Lacks appropriate HEP, Poor posture, Poor body mechanics, Safety issue and Abnormal movement  Understanding of Dx/Px/POC: Fair  Prognosis: Good      Patient verbalized understanding of POC  Please contact me if you have any questions or recommendations  Thank you for the referral and the opportunity to share in [employer] care             Plan  Program: [facility]  Patient would benefit from: Skilled PT, OT, Speech  Planned therapy interventions: Abdominal trunk stabilization, ADL training, Balance, Balance/WB training, Breathing training, Body mechanics training, Coordination, Functional ROM exercises, Gait training, HEP, Motor coordination training, Neuromuscular re-education, Patient education, Postural training, Strengthening, Stretching, Therapeutic activities, Therapeutic exercises and Transfer training  Frequency: 2-3x/wk   Duration in weeks: 12 weeks  Plan of Care beginning date: 4/19/22  Plan of Care expiration date: 12 weeks - 07/11/2022  Treatment plan discussed with: Patient         Goals  Short Term Goals:  - Patient will improve TUG score by MDC of 4 8 sec from 18 92 sec to 14 12 sec in order to reduce risk of falls and to increase safety with functional mobility- MET  - Patient will improve 5 STS by the [address] of 2 3 sec from 16 87 sec to 14 57 sec indicating an improvement in strength and decreased challenge with transfers-  MET  - Patient will perform 2 MWT to promote improvement in cardiovascular endurance in order to maximize function with functional mobility throughout the community- MET  - Patient will be independent in basic HEP in order to manage condition at home- Progressing    Long Term Goals:  - Patient will score a low risk for falls 2/4 fall risks measures - MET  - Patient will score age norm values for 1/2 endurance measures - NOT MET  - Patient will be able to ambulate on uneven surfaces with 50% reduction in near falls to increase safety with community mobility - PROGRESSING  - Patient will be able to perform a floor transfer without physical assistance to assist with fall recovery at home and in the community - NOT [id number] [facility] [address]  - Patient will be independent in comprehensive HEP post discharge from program - PROGRESSING  - Patient will be able to walk up incline with decreased difficulty and no loss of balance in order to improve functional mobility throughout the community - MET  - Patient will be able to perform sit to stands from softer surfaces such as a couch or bed in order to improvement function with transfers - PROGRESSING         Cut off score   All date taken from [facility] Neuro Section or Rehab Measures      MALDONADO Cutoff Scores:  MDC: 5 pts  Falls Risk Cutoff: 40 22/56 DGI Cutoff Scores:  Miguel Ángel Booker al 2011, MDC: 2 9 pts  Claudine et al 2008, Arlene: Score <19/24   MiniBEST Cutoff Scores:  Mary Hargrove al 2011, MDC: 5 52 pts  Jamari Adan 2013, [place]: <19/28 5xSTS Cutoff Scores:  MDC: 2 3 sec  Pato Armando et al, 2011, Arlene: > 16 sec   TUG Cutoff Scores:  Kelvin Anglin al, 2011, MDC: 4 8 sec  Sheyla Thomas et al, 2011, [place]: Meds ON: < 12 21 sec, OFF: 15 5 sec 10 Meter Walk Test Cutoff Scores:  Jovi Maradiaga, 2008, MDC: 0 18 m/s  Age Norm Values, Arlene: < 1 0 m/s   ABC Cutoff Scores:  Cameron Crum, 2008, MDC: 13 pts  Nancy Harmon, MDC: 11 12 pts  Increased risk for falls: < 69% 6 Minute Walk Test Cutoff Scores:  Jovi Maradiaga, 2008, MDC: 269 ft  Edi et al, 2002, Norm Data Healthy Adults:  61 - 71 years:   M: 200 m      F: 1 m  79 - 78 years:   M: 1 m      F: 200 m  [de-identified] - 80 years:   M: 1 m      F: 80 m   PDQ-39 Cutoff Scores:  Vijay Contrerasing al, 2004:  MDC: Mobility (12 24), ADL (16 72), Emotional (14 22), Stigma (21 21), Social Support (21 25), Cognition (21 12), Communication (21 04), Bodily Discomfort (24 48)  Jorge et al, 2007:  Normative Data: Mobility (49 25), ADL (38 94), Emotional (37 92), Stigma (27 54), Social Support (14 78), Cognition (33 03), Communication (27 99), Bodily Discomfort (40 91) Alan and Yahr Stages  Stage 0: No S/S  Stage 1: Mild unilateral symptoms  Stage 1 5: Unilateral and axial symptoms  Stage 2: Bilateral symptoms, no balance impairment  Stage 2 5: Mild bilateral symptoms and recovery with pull test  Stage 3: Mild to moderate postural instability, independent  Stage 4: Severe disability, walking or standing unassisted  Stage 5: Bed bound, w/c bound           Subjective Evaluation    History of Present Illness  Mechanism of injury: PD dx 3 months ago, difficulty with balance  Primary AD: cane or walker  Previous Programs: Physical therapy for COPD, but not for PD    Update 3/17/22: Patient notices improvements in balance and endurance  He reports no recent falls  Patient states that during OT session he was suggested to take a Fitness to Drive evaluation and he is upset about it  He says "They might as well put a bullet in me  There would be no reason to live anymore if I can't drive   How would I be able to do anything?" Upon further questioning, patient states that he does not have an active plan to harm himself but he does not know how he will feel if he is not able to drive  Update 4/19/2022: Patient notices improvements in endurance by ability to walking from lobby to gym with no seated breaks  Overall continues to improve with balance and endurance and is pleased with progress       Vitals  HR: 81 bpm   SpO2: 95%    Social Support  Steps to enter house: 2 step front entrance, 1 through garage  Stairs in house: no  Lives in: house  Lives with: alone    Employment status: retired  Hand dominance: right handed    Treatments  Previous treatment: PT for COPD, but not yet for PD dx      Objective   Gait abnormalities: slow gait speed, shuffling gait, decrease reciprocal arm swing, absent trunk rotation, forward head and shoulder posture      Outcome Measures Initial Eval  1-5-22 Daily Note PN  2/8/22 RE  3/17/22 RE  4/19/22    5xSTS 16 87 sec, from 1 foam pad 2 UE asssit  16 27 sec from 1 foam pad 2 UE asssit 16 1 seconds from 1 foam pad 2 UE assist 14 93  Seconds  from 1 foam pad 1 UE assist    TUG  - Regular  - Cognitive  - Carry   18 92 sec  21 00 sec  Defer    - 15 64 sec w/ SPC  - 16 53 sec w/ SPC  - 16 69 sec w/ SPC   - 15 52 sec w/ SPC   - 17 75 sec w/ SPC  - 17 54 sec w/ SPC    - 10 59 sec  w/ SPC  - 14 52 sec  w/ SPC  - 12 79 sec  NO AD    MALDONADO Defer/56  22/56 32/56 37/56    miniBEST Defer/28  defer defer Defer    10 meter Defer 0 54 m/s 0 43 m/s 0 88 m/s Defer    2MWT Defer ft 150 ft Performed 3 MWT Performed 3MWT Performed 3MWT    ABC Defer% 71 13% 76 3% 92 5% 90%    3 MWT   220 ft 475 feet 475 feet                  Precautions: Requires O2 via nasal canula, fall risk  Past Medical History:   Diagnosis Date    Anesthesia complication     Difficult to wake up    Arthritis     BPH (benign prostatic hyperplasia)     urinary frequency    Cancer (HCC)     basal cell neck, face    COPD (chronic obstructive pulmonary disease) (HCC)     COPD exacerbation ([facility] [address] ) 12/29/2019    Full dentures     Hiatal hernia     History of methicillin resistant staphylococcus aureus (MRSA)     10/11/2018 MRSA (nares) positive    Hypertension     Irritable bowel syndrome     Kidney stone     at least 7 episodes    Liver disease     Alpha 1- enzyme deficiency - diagnosed 2002   has been on weekly replacement therapy since then    Pulmonary emphysema ([address] )     1/25/15  FEV1 - 2 45 liters or 59% of predicted    RSV infection 12/2017    Wears glasses     for driving only

## 2022-04-21 ENCOUNTER — TELEPHONE (OUTPATIENT)
Dept: FAMILY MEDICINE CLINIC | Facility: CLINIC | Age: 77
End: 2022-04-21

## 2022-04-21 ENCOUNTER — APPOINTMENT (EMERGENCY)
Dept: RADIOLOGY | Facility: HOSPITAL | Age: 77
End: 2022-04-21
Payer: MEDICARE

## 2022-04-21 ENCOUNTER — OFFICE VISIT (OUTPATIENT)
Dept: OCCUPATIONAL THERAPY | Facility: CLINIC | Age: 77
End: 2022-04-21
Payer: COMMERCIAL

## 2022-04-21 ENCOUNTER — PATIENT OUTREACH (OUTPATIENT)
Dept: FAMILY MEDICINE CLINIC | Facility: CLINIC | Age: 77
End: 2022-04-21

## 2022-04-21 ENCOUNTER — OFFICE VISIT (OUTPATIENT)
Dept: PHYSICAL THERAPY | Facility: CLINIC | Age: 77
End: 2022-04-21
Payer: COMMERCIAL

## 2022-04-21 ENCOUNTER — HOSPITAL ENCOUNTER (EMERGENCY)
Facility: HOSPITAL | Age: 77
Discharge: HOME | End: 2022-04-21
Attending: EMERGENCY MEDICINE
Payer: MEDICARE

## 2022-04-21 VITALS
DIASTOLIC BLOOD PRESSURE: 98 MMHG | WEIGHT: 178.57 LBS | TEMPERATURE: 98.1 F | SYSTOLIC BLOOD PRESSURE: 200 MMHG | RESPIRATION RATE: 22 BRPM | BODY MASS INDEX: 21.18 KG/M2 | HEART RATE: 75 BPM | OXYGEN SATURATION: 96 %

## 2022-04-21 DIAGNOSIS — G20 PARKINSON'S DISEASE (HCC): Primary | ICD-10-CM

## 2022-04-21 DIAGNOSIS — J44.1 COPD EXACERBATION (HCC): Primary | ICD-10-CM

## 2022-04-21 DIAGNOSIS — R41.3 MEMORY PROBLEM: Primary | ICD-10-CM

## 2022-04-21 DIAGNOSIS — G20 PARKINSON DISEASE (HCC): ICD-10-CM

## 2022-04-21 LAB
2HR DELTA HS TROPONIN: 1 NG/L
ALBUMIN SERPL BCP-MCNC: 3.9 G/DL (ref 3.5–5)
ALP SERPL-CCNC: 90 U/L (ref 46–116)
ALT SERPL W P-5'-P-CCNC: 33 U/L (ref 12–78)
ANION GAP SERPL CALCULATED.3IONS-SCNC: 13 MMOL/L (ref 4–13)
APTT PPP: 33 SECONDS (ref 23–37)
AST SERPL W P-5'-P-CCNC: 21 U/L (ref 5–45)
BASOPHILS # BLD AUTO: 0.07 THOUSANDS/ΜL (ref 0–0.1)
BASOPHILS NFR BLD AUTO: 1 % (ref 0–1)
BILIRUB SERPL-MCNC: 0.98 MG/DL (ref 0.2–1)
BUN SERPL-MCNC: 11 MG/DL (ref 5–25)
CALCIUM SERPL-MCNC: 9 MG/DL (ref 8.3–10.1)
CARDIAC TROPONIN I PNL SERPL HS: 6 NG/L
CARDIAC TROPONIN I PNL SERPL HS: 7 NG/L
CHLORIDE SERPL-SCNC: 107 MMOL/L (ref 100–108)
CO2 SERPL-SCNC: 24 MMOL/L (ref 21–32)
CREAT SERPL-MCNC: 1.2 MG/DL (ref 0.6–1.3)
EOSINOPHIL # BLD AUTO: 0.27 THOUSAND/ΜL (ref 0–0.61)
EOSINOPHIL NFR BLD AUTO: 3 % (ref 0–6)
ERYTHROCYTE [DISTWIDTH] IN BLOOD BY AUTOMATED COUNT: 13.6 % (ref 11.6–15.1)
GFR SERPL CREATININE-BSD FRML MDRD: 57 ML/MIN/1.73SQ M
GLUCOSE SERPL-MCNC: 84 MG/DL (ref 65–140)
HCT VFR BLD AUTO: 42.6 % (ref 36.5–49.3)
HGB BLD-MCNC: 14.3 G/DL (ref 12–17)
IMM GRANULOCYTES # BLD AUTO: 0.03 THOUSAND/UL (ref 0–0.2)
IMM GRANULOCYTES NFR BLD AUTO: 0 % (ref 0–2)
INR PPP: 1.02 (ref 0.84–1.19)
LYMPHOCYTES # BLD AUTO: 3.24 THOUSANDS/ΜL (ref 0.6–4.47)
LYMPHOCYTES NFR BLD AUTO: 37 % (ref 14–44)
MCH RBC QN AUTO: 31.1 PG (ref 26.8–34.3)
MCHC RBC AUTO-ENTMCNC: 33.6 G/DL (ref 31.4–37.4)
MCV RBC AUTO: 93 FL (ref 82–98)
MONOCYTES # BLD AUTO: 0.51 THOUSAND/ΜL (ref 0.17–1.22)
MONOCYTES NFR BLD AUTO: 6 % (ref 4–12)
NEUTROPHILS # BLD AUTO: 4.57 THOUSANDS/ΜL (ref 1.85–7.62)
NEUTS SEG NFR BLD AUTO: 53 % (ref 43–75)
NRBC BLD AUTO-RTO: 0 /100 WBCS
NT-PROBNP SERPL-MCNC: 346 PG/ML
PLATELET # BLD AUTO: 142 THOUSANDS/UL (ref 149–390)
PMV BLD AUTO: 9.1 FL (ref 8.9–12.7)
POTASSIUM SERPL-SCNC: 4.2 MMOL/L (ref 3.5–5.3)
PROT SERPL-MCNC: 7 G/DL (ref 6.4–8.2)
PROTHROMBIN TIME: 13.2 SECONDS (ref 11.6–14.5)
RBC # BLD AUTO: 4.6 MILLION/UL (ref 3.88–5.62)
SODIUM SERPL-SCNC: 144 MMOL/L (ref 136–145)
WBC # BLD AUTO: 8.69 THOUSAND/UL (ref 4.31–10.16)

## 2022-04-21 PROCEDURE — 97112 NEUROMUSCULAR REEDUCATION: CPT

## 2022-04-21 PROCEDURE — 96374 THER/PROPH/DIAG INJ IV PUSH: CPT

## 2022-04-21 PROCEDURE — 80053 COMPREHEN METABOLIC PANEL: CPT | Performed by: EMERGENCY MEDICINE

## 2022-04-21 PROCEDURE — 84484 ASSAY OF TROPONIN QUANT: CPT | Performed by: EMERGENCY MEDICINE

## 2022-04-21 PROCEDURE — 36415 COLL VENOUS BLD VENIPUNCTURE: CPT | Performed by: EMERGENCY MEDICINE

## 2022-04-21 PROCEDURE — 99284 EMERGENCY DEPT VISIT MOD MDM: CPT | Performed by: EMERGENCY MEDICINE

## 2022-04-21 PROCEDURE — 85610 PROTHROMBIN TIME: CPT | Performed by: EMERGENCY MEDICINE

## 2022-04-21 PROCEDURE — 93005 ELECTROCARDIOGRAM TRACING: CPT

## 2022-04-21 PROCEDURE — 94640 AIRWAY INHALATION TREATMENT: CPT

## 2022-04-21 PROCEDURE — 85730 THROMBOPLASTIN TIME PARTIAL: CPT | Performed by: EMERGENCY MEDICINE

## 2022-04-21 PROCEDURE — 71045 X-RAY EXAM CHEST 1 VIEW: CPT

## 2022-04-21 PROCEDURE — 97530 THERAPEUTIC ACTIVITIES: CPT

## 2022-04-21 PROCEDURE — 99285 EMERGENCY DEPT VISIT HI MDM: CPT

## 2022-04-21 PROCEDURE — 85025 COMPLETE CBC W/AUTO DIFF WBC: CPT | Performed by: EMERGENCY MEDICINE

## 2022-04-21 PROCEDURE — 83880 ASSAY OF NATRIURETIC PEPTIDE: CPT | Performed by: EMERGENCY MEDICINE

## 2022-04-21 PROCEDURE — 1124F ACP DISCUSS-NO DSCNMKR DOCD: CPT | Performed by: EMERGENCY MEDICINE

## 2022-04-21 RX ORDER — PREDNISONE 10 MG/1
20 TABLET ORAL DAILY
Qty: 10 TABLET | Refills: 0 | Status: SHIPPED | OUTPATIENT
Start: 2022-04-21 | End: 2022-04-26

## 2022-04-21 RX ORDER — METHYLPREDNISOLONE SODIUM SUCCINATE 125 MG/2ML
125 INJECTION, POWDER, LYOPHILIZED, FOR SOLUTION INTRAMUSCULAR; INTRAVENOUS ONCE
Status: COMPLETED | OUTPATIENT
Start: 2022-04-21 | End: 2022-04-21

## 2022-04-21 RX ORDER — IPRATROPIUM BROMIDE AND ALBUTEROL SULFATE 2.5; .5 MG/3ML; MG/3ML
3 SOLUTION RESPIRATORY (INHALATION) ONCE
Status: COMPLETED | OUTPATIENT
Start: 2022-04-21 | End: 2022-04-21

## 2022-04-21 RX ORDER — IPRATROPIUM BROMIDE AND ALBUTEROL SULFATE 2.5; .5 MG/3ML; MG/3ML
3 SOLUTION RESPIRATORY (INHALATION) ONCE
Status: DISCONTINUED | OUTPATIENT
Start: 2022-04-21 | End: 2022-04-21 | Stop reason: HOSPADM

## 2022-04-21 RX ADMIN — IPRATROPIUM BROMIDE AND ALBUTEROL SULFATE 3 ML: 2.5; .5 SOLUTION RESPIRATORY (INHALATION) at 13:51

## 2022-04-21 RX ADMIN — METHYLPREDNISOLONE SODIUM SUCCINATE 125 MG: 125 INJECTION, POWDER, FOR SOLUTION INTRAMUSCULAR; INTRAVENOUS at 13:49

## 2022-04-21 NOTE — PROGRESS NOTES
Daily Note   RE 3/17/22  POC 22    Insurance:  AMA/CMS Eval/ Re-eval POC expires Davon Turpin #/ Referral # Total units  Start date  Expiration date Extension  Visit limitation? PT only or  PT+OT? Co-Insurance   CMS 1/5/ 22 3-30-22  Not required NA NA NA NA BOMN PT Yes, $0 copay    3/17 6/9/22                                                            Today's date: 2022  Patient name: Shahbaz Moya  : 1945  MRN: [id number]  Referring provider: Shannan Collins MD  Dx:   Encounter Diagnosis     ICD-10-CM    1  Parkinson's disease ([facility] [address] )  G20                   Subjective: Patient walked with PT from wait room to PT session with portable O2 at 3L and SPC  Patient reports he feels weak and lightheaded today  States that after he sits for a while the lightheadedness resolved  This has been going on for several months per his report  He will call his pulmonologist today  Objective: See treatment diary below    GAIT BELT @ START OF TREATMENT    Vitals  - SPO2: 96% on 3L of O2  - HR: 90 bpm after walking in from wait room to Neuro gym  - BP: 130/62 mmHg Pt was provided a cup of water    NMR (2# wrist weights, 3# ankle weights donned start of session)  1  Large amplitude reaching from floor to ceiling - 10 reps, dynadisc on chair  2  Large amplitude reaching from side to side - 10 reps B/L, dynadisc on chair  3  Standing large amplitude forward step  - 10 reps bilaterally w/ gait belt and CGA  SPO2= 90, with quick rise to 95% (2 min), slow rise to 96%  4  NP Standing [place] amplitude side step - 10 reps bilaterally w/ gait belt and CGA (SpO2: 94% on 3L O2)  5  Standing large amplitude backwards step - 10 reps bilaterally w/ gait belt and CGA   - Ambulation from lobby, 150'  At start of session  TE:   - NP NuStep, level 2, 7 minutes      Assessment: Patient tolerated treatment fairly today with continued focus on large amplitude movements  Pt was limited due to complaints of lightheadedness   Tried to have pt refrain from counting, but he continued to get LH  Patient demonstrated good amplitude today with standing interventions; may consider attempting adding karen again next session to further challenge  Patient had NO  near LOB when ambulating with CG with therapist  Patient will benefit from continued skilled therapy to improve endurance, strength, and balance and normalize gait for maximal daily function at home and in community  Plan: Continue per plan of care  Outcome Measures Initial Eval  1-5-22 Daily Note PN  2/8/22      5xSTS 16 87 sec, from 1 foam pad 2 UE asssit  16 27 sec from 1 foam pad 2 UE asssit      TUG  - Regular  - Cognitive  - Carry   18 92 sec  21 00 sec  Defer    - 15 64 sec w/ SPC  - 16 53 sec w/ SPC  - 16 69 sec w/ SPC      MALDONADO Defer/56  22/56      miniBEST Defer/28  defer      10 meter Defer 0 54 m/s 0 43 m/s      2MWT Defer ft 150 ft Performed 3 MWT      ABC Defer% 71 13% 76 3%      3 MWT   220 ft                    Precautions: Requires O2 via nasal canula, fall risk  Past Medical History:   Diagnosis Date    Anesthesia complication     Difficult to wake up    Arthritis     BPH (benign prostatic hyperplasia)     urinary frequency    Cancer (HCC)     basal cell neck, face    COPD (chronic obstructive pulmonary disease) (HCC)     COPD exacerbation ([address] ) 12/29/2019    Full dentures     Hiatal hernia     History of methicillin resistant staphylococcus aureus (MRSA)     10/11/2018 MRSA (nares) positive    Hypertension     Irritable bowel syndrome     Kidney stone     at least 7 episodes    Liver disease     Alpha 1- enzyme deficiency - diagnosed 2002   has been on weekly replacement therapy since then    Pulmonary emphysema ([address] )     1/25/15  FEV1 - 2 45 liters or 59% of predicted    RSV infection 12/2017    Wears glasses     for driving only

## 2022-04-21 NOTE — ED PROVIDER NOTES
History  Chief Complaint   Patient presents with    Shortness of Breath     was at cardiac rehab and became SOB  states it has been getting worse for a couple of days , is much worse today     65 male with co sob while he was doing PT earlier today  No fevers or chills  States he thinks he had to walk too much  No fevers/chills/cp/n/v/d no alleviating factors except when he sits down he has started  To get his breathing under control  Hx of similar in the past        History provided by:  Patient   used: No        Prior to Admission Medications   Prescriptions Last Dose Informant Patient Reported? Taking? Diclofenac Sodium (VOLTAREN) 1 %  Self No No   Sig: Apply 2 g topically 4 (four) times a day for 7 days Apply to R knee   LORazepam (ATIVAN) 1 mg tablet  Self Yes No   OXYGEN-HELIUM IN  Self Yes No   Sig: Inhale 2L at rest- 3L with activity   Ventolin  (90 Base) MCG/ACT inhaler  Self No No   Sig: Inhale 2 puffs every 4 (four) hours as needed for wheezing   ZEMAIRA infusion  Self Yes No   Sig: once a week    amLODIPine (NORVASC) 10 mg tablet  Self No No   Sig: TAKE ONE TABLET BY MOUTH DAILY    budesonide (PULMICORT) 0 5 mg/2 mL nebulizer solution  Self No No   Sig: Take 2 mL (0 5 mg total) by nebulization 2 (two) times a day   busPIRone (BUSPAR) 10 mg tablet  Self No No   Sig: TAKE ONE TABLET BY MOUTH THREE TIMES DAILY    carbamide peroxide (DEBROX) 6 5 % otic solution  Self No No   Sig: Administer 5 drops into both ears 2 (two) times a day as needed for ear pain   carbidopa-levodopa (SINEMET)  mg per tablet   No No   Sig: TAKE HALF TABLET BY MOUTH 30 minutes prior to breakfast, lunch, and dinner for 1 week   then increase to 1 tablet prior to each meal   cholestyramine (QUESTRAN) 4 g packet  Self Yes No   Sig: Take 1 packet by mouth daily   doxylamine (Unisom SleepTabs) 25 MG tablet  Self Yes No   Sig: Take 25 mg by mouth daily at bedtime as needed for sleep   finasteride (PROSCAR) 5 mg tablet  Self No No   Sig: TAKE ONE TABLET BY MOUTH IN THE MORNING    fluticasone (FLONASE) 50 mcg/act nasal spray  Self No No   Si sprays into each nostril daily   gabapentin (NEURONTIN) 300 mg capsule   No No   Sig: TAKE ONE CAPSULE BY MOUTH THREE TIMES DAILY   ipratropium-albuterol (DUO-NEB) 0 5-2 5 mg/3 mL nebulizer solution  Self No No   Sig: Take 3 mL by nebulization 4 (four) times a day   midodrine (PROAMATINE) 10 MG tablet  Self No No   Sig: Take 1 tab three times daily  Please hold medication if blood pressure is above 986 systolic    mirtazapine (REMERON) 15 mg tablet  Self No No   Sig: Take 1 tablet (15 mg total) by mouth daily at bedtime   pantoprazole (PROTONIX) 40 mg tablet  Self No No   Sig: TAKE ONE TABLET BY MOUTH TWICE DAILY   tamsulosin (FLOMAX) 0 4 mg  Self No No   Sig: TAKE ONE CAPSULE BY MOUTH DAILY      Facility-Administered Medications: None       Past Medical History:   Diagnosis Date    Anesthesia complication     Difficult to wake up    Arthritis     BPH (benign prostatic hyperplasia)     urinary frequency    Cancer (HCC)     basal cell neck, face    COPD (chronic obstructive pulmonary disease) (HCC)     COPD exacerbation (HCC) 2019    Full dentures     Hiatal hernia     History of methicillin resistant staphylococcus aureus (MRSA)     10/11/2018 MRSA (nares) positive    Hypertension     Irritable bowel syndrome     Kidney stone     at least 7 episodes    Liver disease     Alpha 1- enzyme deficiency - diagnosed   has been on weekly replacement therapy since then    Pulmonary emphysema ([facility] [address] )     1/25/15  FEV1 - 2 45 liters or 59% of predicted    RSV infection 2017    Wears glasses     for driving only       Past Surgical History:   Procedure Laterality Date    BACK SURGERY      discectomy    COLONOSCOPY      COLONOSCOPY N/A 3/10/2017    Procedure: Ebonie Giordano;  Surgeon: Vitaliy Roberts MD;  Location: [place] [address] GI LAB;   Service:    [facility] CYSTOSCOPY      removal of kidney stones    ESOPHAGOGASTRODUODENOSCOPY N/A 3/10/2017    Procedure: ESOPHAGOGASTRODUODENOSCOPY (EGD); Surgeon: Franklyn Gutierrez MD;  Location: [facility] GI LAB; Service:     LITHOTRIPSY      TX ESOPHAGOGASTRODUODENOSCOPY TRANSORAL DIAGNOSTIC N/A 2018    Procedure: ESOPHAGOGASTRODUODENOSCOPY (EGD); Surgeon: Franklyn Gutierrez MD;  Location: [facility] GI LAB; Service: Gastroenterology    TONSILLECTOMY      VEIN LIGATION AND STRIPPING Bilateral     18's       Family History   Problem Relation Age of Onset    Emphysema Mother         never smoked    Emphysema Father     Cancer Brother         colon    Colon cancer Brother     Ulcerative colitis Family     Liver disease Family      I have reviewed and agree with the history as documented  E-Cigarette/Vaping    E-Cigarette Use Never User      E-Cigarette/Vaping Substances    Nicotine No     THC No     CBD No     Flavoring No     Other No     Unknown No      Social History     Tobacco Use    Smoking status: Former Smoker     Packs/day: 1 00     Years: 60 00     Pack years: 60 00     Quit date: 2017     Years since quittin 6    Smokeless tobacco: Never Used    Tobacco comment: quit in 2017   Vaping Use    Vaping Use: Never used   Substance Use Topics    Alcohol use: Not Currently     Comment: stopped in     Drug use: Not Currently       Review of Systems   Constitutional: Negative for activity change, chills, diaphoresis and fever  HENT: Negative for congestion, ear pain, nosebleeds, sore throat, trouble swallowing and voice change  Eyes: Negative for pain, discharge and redness  Respiratory: Positive for shortness of breath and wheezing  Negative for apnea, cough, choking and stridor  Cardiovascular: Negative for chest pain and palpitations  Gastrointestinal: Negative for abdominal distention, abdominal pain, constipation, diarrhea, nausea and vomiting     Endocrine: Negative for polydipsia  Genitourinary: Negative for difficulty urinating, dysuria, flank pain, frequency, hematuria and urgency  Musculoskeletal: Negative for back pain, gait problem, joint swelling, myalgias, neck pain and neck stiffness  Skin: Negative for pallor and rash  Neurological: Negative for dizziness, tremors, syncope, speech difficulty, weakness, numbness and headaches  Hematological: Negative for adenopathy  Psychiatric/Behavioral: Negative for confusion, hallucinations, self-injury and suicidal ideas  The patient is not nervous/anxious  Physical Exam  Physical Exam  Vitals and nursing note reviewed  Constitutional:       General: He is not in acute distress  Appearance: He is well-developed  He is not diaphoretic  HENT:      Head: Normocephalic and atraumatic  Right Ear: External ear normal       Left Ear: External ear normal       Nose: Nose normal    Eyes:      Conjunctiva/sclera: Conjunctivae normal       Pupils: Pupils are equal, round, and reactive to light  Cardiovascular:      Rate and Rhythm: Normal rate and regular rhythm  Heart sounds: Normal heart sounds  Pulmonary:      Effort: Pulmonary effort is normal       Breath sounds: Examination of the right-upper field reveals wheezing  Examination of the left-upper field reveals wheezing  Examination of the right-middle field reveals wheezing  Examination of the left-middle field reveals wheezing  Examination of the right-lower field reveals wheezing  Examination of the left-lower field reveals wheezing  Wheezing present  Abdominal:      General: Bowel sounds are normal       Palpations: Abdomen is soft  Musculoskeletal:         General: Normal range of motion  Cervical back: Normal range of motion and neck supple  Skin:     General: Skin is warm and dry  Neurological:      Mental Status: He is alert and oriented to person, place, and time  Deep Tendon Reflexes: Reflexes are normal and symmetric  Vital Signs  ED Triage Vitals   Temperature Pulse Respirations Blood Pressure SpO2   04/21/22 1325 04/21/22 1323 04/21/22 1323 04/21/22 1323 04/21/22 1323   98 1 °F (36 7 °C) 85 (!) 26 (!) 197/99 95 %      Temp src Heart Rate Source Patient Position - Orthostatic VS BP Location FiO2 (%)   -- 04/21/22 1400 04/21/22 1630 04/21/22 1630 --    Monitor Sitting Right arm       Pain Score       04/21/22 1323       No Pain           Vitals:    04/21/22 1539 04/21/22 1630 04/21/22 1705 04/21/22 1735   BP: (!) 199/93 (!) 187/105 (!) 187/91 (!) 183/89   Pulse: 68 78 71 71   Patient Position - Orthostatic VS:  Sitting           Visual Acuity      ED Medications  Medications   ipratropium-albuterol (DUO-NEB) 0 5-2 5 mg/3 mL inhalation solution 3 mL (3 mL Nebulization Given 4/21/22 1351)   methylPREDNISolone sodium succinate (Solu-MEDROL) injection 125 mg (125 mg Intravenous Given 4/21/22 1349)       Diagnostic Studies  Results Reviewed     Procedure Component Value Units Date/Time    HS Troponin I 2hr [[id number]]  (Normal) Collected: 04/21/22 1653    Lab Status: Final result Specimen: Blood from Hand, Right Updated: 04/21/22 1725     hs TnI 2hr 7 ng/L      Delta 2hr hsTnI 1 ng/L     HS Troponin I 4hr [[id number]]     Lab Status: No result Specimen: Blood     NT-BNP PRO [[id number]]  (Normal) Collected: 04/21/22 1344    Lab Status: Final result Specimen: Blood from Arm, Right Updated: 04/21/22 1429     NT-proBNP 346 pg/mL     Comprehensive metabolic panel [[id number]] Collected: 04/21/22 1344    Lab Status: Final result Specimen: Blood from Arm, Right Updated: 04/21/22 1422     Sodium 144 mmol/L      Potassium 4 2 mmol/L      Chloride 107 mmol/L      CO2 24 mmol/L      ANION GAP 13 mmol/L      BUN 11 mg/dL      Creatinine 1 20 mg/dL      Glucose 84 mg/dL      Calcium 9 0 mg/dL      AST 21 U/L      ALT 33 U/L      Alkaline Phosphatase 90 U/L      Total Protein 7 0 g/dL      Albumin 3 9 g/dL      Total Bilirubin 0 98 mg/dL eGFR 57 ml/min/1 73sq m     Narrative:      [place] guidelines for Chronic Kidney Disease (CKD):     Stage 1 with normal or high GFR (GFR > 90 mL/min/1 73 square meters)    Stage 2 Mild CKD (GFR = 60-89 mL/min/1 73 square meters)    Stage 3A Moderate CKD (GFR = 45-59 mL/min/1 73 square meters)    Stage 3B Moderate CKD (GFR = 30-44 mL/min/1 73 square meters)    Stage 4 Severe CKD (GFR = 15-29 mL/min/1 73 square meters)    Stage 5 End Stage CKD (GFR <15 mL/min/1 73 square meters)  Note: GFR calculation is accurate only with a steady state creatinine    HS Troponin 0hr (reflex protocol) [[id number]]  (Normal) Collected: 04/21/22 1344    Lab Status: Final result Specimen: Blood from Arm, Right Updated: 04/21/22 1421     hs TnI 0hr 6 ng/L     Protime-INR [[id number]]  (Normal) Collected: 04/21/22 1344    Lab Status: Final result Specimen: Blood from Arm, Right Updated: 04/21/22 1408     Protime 13 2 seconds      INR 1 02    APTT [[id number]]  (Normal) Collected: 04/21/22 1344    Lab Status: Final result Specimen: Blood from Arm, Right Updated: 04/21/22 1408     PTT 33 seconds     CBC and differential [[id number]]  (Abnormal) Collected: 04/21/22 1344    Lab Status: Final result Specimen: Blood from Arm, Right Updated: 04/21/22 1353     WBC 8 69 Thousand/uL      RBC 4 60 Million/uL      Hemoglobin 14 3 g/dL      Hematocrit 42 6 %      MCV 93 fL      MCH 31 1 pg      MCHC 33 6 g/dL      RDW 13 6 %      MPV 9 1 fL      Platelets 971 Thousands/uL      nRBC 0 /100 WBCs      Neutrophils Relative 53 %      Immat GRANS % 0 %      Lymphocytes Relative 37 %      Monocytes Relative 6 %      Eosinophils Relative 3 %      Basophils Relative 1 %      Neutrophils Absolute 4 57 Thousands/µL      Immature Grans Absolute 0 03 Thousand/uL      Lymphocytes Absolute 3 24 Thousands/µL      Monocytes Absolute 0 51 Thousand/µL      Eosinophils Absolute 0 27 Thousand/µL      Basophils Absolute 0 07 Thousands/µL XR chest 1 view portable    (Results Pending)              Procedures  Procedures         ED Course                               SBIRT 22yo+      Most Recent Value   SBIRT (24 yo +)    In order to provide better care to our patients, we are screening all of our patients for alcohol and drug use  Would it be okay to ask you these screening questions? Yes Filed at: 04/21/2022 1342   Initial Alcohol Screen: US AUDIT-C     1  How often do you have a drink containing alcohol? 0 Filed at: 04/21/2022 1342   2  How many drinks containing alcohol do you have on a typical day you are drinking? 0 Filed at: 04/21/2022 1342   3a  Male UNDER 65: How often do you have five or more drinks on one occasion? 0 Filed at: 04/21/2022 1342   3b  FEMALE Any Age, or MALE 65+: How often do you have 4 or more drinks on one occassion? 0 Filed at: 04/21/2022 1342   Audit-C Score 0 Filed at: 04/21/2022 1342   DIANE: How many times in the past year have you    Used an illegal drug or used a prescription medication for non-medical reasons? Never Filed at: 04/21/2022 1342                    MDM    Disposition  Final diagnoses:   COPD exacerbation ([address] )     Time reflects when diagnosis was documented in both MDM as applicable and the Disposition within this note     Time User Action Codes Description Comment    4/21/2022  5:37 PM Hieu Bailey [J44 1] COPD exacerbation [facility])       ED Disposition     ED Disposition Condition Date/Time Comment    Discharge Stable u Apr 21, 2022  5:37 PM Niesha Payan discharge to home/self care              Follow-up Information     Follow up With Specialties Details Why [facility] MD [facility] Schedule an appointment as soon as possible for a visit  As needed [address] [phone]            Patient's Medications   Discharge Prescriptions    PREDNISONE 10 MG TABLET    Take 2 tablets (20 mg total) by mouth daily for 5 days       Start Date: 4/21/2022 End Date: 4/26/2022       Order Dose: 20 mg       Quantity: 10 tablet    Refills: 0       No discharge procedures on file      PDMP Review       Value Time User    PDMP Reviewed  Yes 4/5/2022  9:56 AM Carol Francis MD          ED Provider  Electronically Signed by           Marielena Jiménez DO  04/21/22 1573

## 2022-04-21 NOTE — ED NOTES
Pt offered warm blankets and is resting comfortably  Pt nebulizer given and pt reports that treatment has helped minimize SOB  Will continue to monitor        [place] [facility], [address] [facility]  04/21/22 2056

## 2022-04-21 NOTE — TELEPHONE ENCOUNTER
Kim Deutsch from physical therapy called to inform the patients respiratory rate was high and his O2 sat dipped after therapy and patient lost balance  Physical therapy sent him to [facility] ED via a lyft  Also transitioning him for Home health therapy

## 2022-04-21 NOTE — PROGRESS NOTES
Daily Note     Today's date: 2022  Patient name: Cameron Cali  : 1945  MRN: [id number]  Referring provider: Annalisa Concepcion MD  Dx:   Encounter Diagnoses   Name Primary?  Memory problem Yes    Parkinson disease ([facility] [address] )                   Precautions: walk to front, anxiety, hx of difficulty with reading and spelling, vision impairment- needs large print  Visit 3 of 10   PN due 22  POC valid 22     Subjective: "I just want to drive " - recommending to f/u with MD regarding driving  And needed moderate prompting to be redirected throughout session  Objective: See treatment below  NMRE:  NOT FOCUS     Ther Act:  -multimatrix simple shapes with independence  - multirmatrix alphabet and animals required min VCs for recall of alphabet sequence, and problem solving animals  -embedded word search of divided attention with pt able to complete with pt required 5 VCs for completion of 2 step directions  While pt was being escorted out of therapy gym approximately 250-300' using SC and with O2 donned, pt demonstrating 2 LOB with 1st requiring CG and 2nd required minimal A and demonstrating decreased O2- 85% with O2 donned- pt demonstrating increased respiratory rate and reports light headedness/dizziness, and reports "feeling weak"  /80 after 3-4 minutes rest break seated- O2 94% however continues to demonstrate increased respiratory rate and pt reports he was going to go home alone  recommending to go to ED and pt in agreement  Pt's daughter notified  Pt was brought to ED by ambulance squad  Assessment: Tolerated treatment fairly  Demonstrated difficulty with divided atteniton and continues to perseverate on returning to driving  Will continue to progress pt as tolerated  Plan: Continued skilled OT per POC

## 2022-04-22 LAB
ATRIAL RATE: 87 BPM
P AXIS: 95 DEGREES
PR INTERVAL: 162 MS
QRS AXIS: 13 DEGREES
QRSD INTERVAL: 98 MS
QT INTERVAL: 374 MS
QTC INTERVAL: 450 MS
T WAVE AXIS: 34 DEGREES
VENTRICULAR RATE: 87 BPM

## 2022-04-22 PROCEDURE — 93010 ELECTROCARDIOGRAM REPORT: CPT | Performed by: INTERNAL MEDICINE

## 2022-04-25 ENCOUNTER — PATIENT OUTREACH (OUTPATIENT)
Dept: FAMILY MEDICINE CLINIC | Facility: CLINIC | Age: 77
End: 2022-04-25

## 2022-04-25 NOTE — PROGRESS NOTES
Outgoing call  04/25/2022    [facility] called patient to [phone] from [phone], [facility] completed assessment over the phone and patient agreed to receive services, he is interested in applying for lanta services and would like [facility] to conduct a home visit to complete application   [facility] agreed and scheduled a home visit for 04/28 2022 at 2:00 pm    Next outreach is scheduled for 04/28/2022 for a HV

## 2022-04-26 ENCOUNTER — PATIENT OUTREACH (OUTPATIENT)
Dept: FAMILY MEDICINE CLINIC | Facility: CLINIC | Age: 77
End: 2022-04-26

## 2022-04-26 ENCOUNTER — OFFICE VISIT (OUTPATIENT)
Dept: PHYSICAL THERAPY | Facility: CLINIC | Age: 77
End: 2022-04-26
Payer: COMMERCIAL

## 2022-04-26 ENCOUNTER — OFFICE VISIT (OUTPATIENT)
Dept: OCCUPATIONAL THERAPY | Facility: CLINIC | Age: 77
End: 2022-04-26
Payer: COMMERCIAL

## 2022-04-26 DIAGNOSIS — R41.3 MEMORY PROBLEM: Primary | ICD-10-CM

## 2022-04-26 DIAGNOSIS — G20 PARKINSON DISEASE (HCC): ICD-10-CM

## 2022-04-26 DIAGNOSIS — G20 PARKINSON'S DISEASE (HCC): Primary | ICD-10-CM

## 2022-04-26 PROCEDURE — 97530 THERAPEUTIC ACTIVITIES: CPT | Performed by: OCCUPATIONAL THERAPIST

## 2022-04-26 PROCEDURE — 97530 THERAPEUTIC ACTIVITIES: CPT

## 2022-04-26 NOTE — PROGRESS NOTES
Daily Note   RE 3/17/22  POC 22    Insurance:  AMA/CMS Eval/ Re-eval POC expires Marquita Jaramillo #/ Referral # Total units  Start date  Expiration date Extension  Visit limitation? PT only or  PT+OT? Co-Insurance   CMS 1/5/ 22 3-30-22  Not required NA NA NA NA BOMN PT Yes, $0 copay    3/17 6/9/22                                                            Today's date: 2022  Patient name: Jeff Castro  : 1945  MRN: [id number]  Referring provider: Nahum Crowder MD  Dx:   Encounter Diagnosis     ICD-10-CM    1  Parkinson's disease ([facility] [address] )  G20                   Subjective: Patient walked with OT session with OT reports BP's as follows (170/92 seated after the entire session, 190/92 after we walk back from front)    Objective: See treatment diary below    GAIT BELT @ START OF TREATMENT    Vitals  - SPO2: 96% on 3L of O2  - HR: 70 bpm after walking in from OT to Neuro gym  - BP: 190/110 mmHg, pt was provided a cup of water  - BP: 180/90 mmHg seated 25 mins     TA  - Called pulmonology see below  - Ambulation up front x150 ft, O2 96%  - Education regarding importance of a good diet and hydration- pt reporting fair understanding    Assessment: Patient tolerated treatment poorly secondary to baseline symptoms  Unable to perform exercises today as patients baseline BP levels are above the normal limit appropriate to exercise at according to ACSM's guidelines  Spent majority of the session focused on calling his pulmonologist regarding his BP concerns  As well as general deconditioning  Patient will be seeing pulmonologist tomorrow regarding these symptoms  Able to provide a note for his pulmonologist regarding concerns for BP and possible Hold  At this time patient is on HOLD for skilled PT unless cleared by pulmonology 22  Discussed with patient to call emergency services if he feels worse  Patient declines need for emergency services and BP did lower at the end of the session   Patient was able to tolerate a short walk to the front with no increase in symptoms  Patient reported good understanding of discussion and no further questions  Plan: Continue per plan of care  Outcome Measures Initial Eval  1-5-22 Daily Note PN  2/8/22      5xSTS 16 87 sec, from 1 foam pad 2 UE asssit  16 27 sec from 1 foam pad 2 UE asssit      TUG  - Regular  - Cognitive  - Carry   18 92 sec  21 00 sec  Defer    - 15 64 sec w/ SPC  - 16 53 sec w/ SPC  - 16 69 sec w/ SPC      MALDONADO Defer/56  22/56      miniBEST Defer/28  defer      10 meter Defer 0 54 m/s 0 43 m/s      2MWT Defer ft 150 ft Performed 3 MWT      ABC Defer% 71 13% 76 3%      3 MWT   220 ft                    Precautions: Requires O2 via nasal canula, fall risk  Past Medical History:   Diagnosis Date    Anesthesia complication     Difficult to wake up    Arthritis     BPH (benign prostatic hyperplasia)     urinary frequency    Cancer (HCC)     basal cell neck, face    COPD (chronic obstructive pulmonary disease) (HCC)     COPD exacerbation ([facility] [address] ) 12/29/2019    Full dentures     Hiatal hernia     History of methicillin resistant staphylococcus aureus (MRSA)     10/11/2018 MRSA (nares) positive    Hypertension     Irritable bowel syndrome     Kidney stone     at least 7 episodes    Liver disease     Alpha 1- enzyme deficiency - diagnosed 2002   has been on weekly replacement therapy since then    Pulmonary emphysema ([address] )     1/25/15  FEV1 - 2 45 liters or 59% of predicted    RSV infection 12/2017    Wears glasses     for driving only

## 2022-04-26 NOTE — PROGRESS NOTES
Pt transportation issues being addressed by [facility]  Pt will remain on surveillance

## 2022-04-26 NOTE — PROGRESS NOTES
Daily Note     Today's date: 2022  Patient name: Marilee Summers  : 1945  MRN: [id number]  Referring provider: Dory Kennedy MD  Dx:   Encounter Diagnoses   Name Primary?  Memory problem Yes    Parkinson disease ([facility] [address] )        Start Time: 1005  Stop Time: 1100  Total time in clinic (min): 55 minutes    Precautions: walk to front, anxiety, hx of difficulty with reading and spelling, vision impairment- needs large print  Visit 7 of 10   PN due 22  POC valid 22     Subjective: Pt stated he's going to see his doctor tomorrow  He left the ER after last tx session because he didn't want to stay  He also stated "I can't eat and I can't sleep because of my aggravation " (pt stated he had a donut and half a cup of coffee before tx session, refused water when offered)      Objective: See treatment below  NMRE:  -    Ther Act:  -Pt required close supervision and 1 rest break to ambulate from waiting room to tx room  BP checked after ~2 minute seated break and was 190/92  Rechecked toward end of session and was 170/92, handed off to PT and notified of BP   -Separate the sayings worksheet completed to address attention, mental manipulation, problem solving  Completed with extra time, no assistance  Pt commented that his handwriting was "terrible" but stated that it has always been   -Immediate verbal memory with mental manipulation component- sequencing fields of 3-4 words from least to most expensive  Fields of 3 completed correctly 4/5 times  Unable to accurately recall fields of 4 on 5 attempts- recalled 3/4    -Reverse words/short phrases worksheet completed to address sustained attention, mental manipulation  Required modA  Assessment: Tolerated treatment fairly  Demonstrated difficulty with immediate recall, mental flexibility, and continues to perseverate on returning to driving  Will continue to progress pt as tolerated  Plan: Continued skilled OT per POC

## 2022-04-27 ENCOUNTER — OFFICE VISIT (OUTPATIENT)
Dept: PULMONOLOGY | Facility: MEDICAL CENTER | Age: 77
End: 2022-04-27
Payer: MEDICARE

## 2022-04-27 ENCOUNTER — TELEPHONE (OUTPATIENT)
Dept: FAMILY MEDICINE CLINIC | Facility: CLINIC | Age: 77
End: 2022-04-27

## 2022-04-27 VITALS
WEIGHT: 178.4 LBS | HEIGHT: 77 IN | DIASTOLIC BLOOD PRESSURE: 98 MMHG | RESPIRATION RATE: 12 BRPM | SYSTOLIC BLOOD PRESSURE: 174 MMHG | BODY MASS INDEX: 21.06 KG/M2 | HEART RATE: 77 BPM | TEMPERATURE: 97.7 F | OXYGEN SATURATION: 95 %

## 2022-04-27 DIAGNOSIS — J44.1 COPD EXACERBATION (HCC): ICD-10-CM

## 2022-04-27 DIAGNOSIS — J43.2 CENTRILOBULAR EMPHYSEMA (HCC): Chronic | ICD-10-CM

## 2022-04-27 DIAGNOSIS — I15.8 OTHER SECONDARY HYPERTENSION: ICD-10-CM

## 2022-04-27 DIAGNOSIS — J96.11 CHRONIC RESPIRATORY FAILURE WITH HYPOXIA (HCC): Chronic | ICD-10-CM

## 2022-04-27 DIAGNOSIS — E88.01 AAT (ALPHA-1-ANTITRYPSIN) DEFICIENCY (HCC): Primary | Chronic | ICD-10-CM

## 2022-04-27 PROCEDURE — 99214 OFFICE O/P EST MOD 30 MIN: CPT | Performed by: NURSE PRACTITIONER

## 2022-04-27 RX ORDER — PREDNISONE 20 MG/1
TABLET ORAL
Qty: 20 TABLET | Refills: 0 | Status: SHIPPED | OUTPATIENT
Start: 2022-04-27 | End: 2022-05-04 | Stop reason: HOSPADM

## 2022-04-27 NOTE — TELEPHONE ENCOUNTER
Received TT that patient BP was elevated at [facility] office visit  in the meantime, please ask patient to decrease Midodrine to 0 5 tablet three times daily instead of 1 full tablet  Arrange for a visit or a follow up with home BP until next visit

## 2022-04-27 NOTE — ASSESSMENT & PLAN NOTE
Patient has been running with high blood pressures  His current blood pressure is acceptable but elevated at 170 4/98  He was in the ER recently for which his blood pressure was found to be elevated at 190 7/99  I was able to contact his PCP  Dr Ant Moyer said that he will contact patient and possibly lower or have him stop the midodrine

## 2022-04-27 NOTE — ASSESSMENT & PLAN NOTE
Patient continues to use an benefit from supplemental oxygen  His room air oxygen at rest today was 96%  With ambulating on 3 L O2 saturation was not 90 2%  I instructed patient that he can increase up to 4 L of supplemental oxygen  Patient was agreeable with this plan of care

## 2022-04-27 NOTE — ASSESSMENT & PLAN NOTE
Patient continues to have distress respiratory nature  Was recently seen in emergency department  He was given IV Solu-Medrol as well as a 5 day course of oral steroid  Patient is now on nebulizer with Pulmicort and Perforomist   He is not able to do PFT and I doubt that this would be possible in the future  I am going to give him a tapering dose of oral steroid

## 2022-04-27 NOTE — PROGRESS NOTES
Assessment/Plan:     Problem List Items Addressed This Visit        Digestive    AAT (alpha-1-antitrypsin) deficiency ([facility] ) - Primary (Chronic)     Stable  Patient receiving at home weekly infusion of IV Zemaira  Respiratory    Chronic respiratory failure with hypoxia (HCC) (Chronic)     Patient continues to use an benefit from supplemental oxygen  His room air oxygen at rest today was 96%  With ambulating on 3 L O2 saturation was not 90 2%  I instructed patient that he can increase up to 4 L of supplemental oxygen  Patient was agreeable with this plan of care  Centrilobular emphysema ([facility] ) (Chronic)     Patient continues to have distress respiratory nature  Was recently seen in emergency department  He was given IV Solu-Medrol as well as a 5 day course of oral steroid  Patient is now on nebulizer with Pulmicort and Perforomist   He is not able to do PFT and I doubt that this would be possible in the future  I am going to give him a tapering dose of oral steroid  Relevant Medications    predniSONE 20 mg tablet       Cardiovascular and Mediastinum    Other secondary hypertension     Patient has been running with high blood pressures  His current blood pressure is acceptable but elevated at 170 4/98  He was in the ER recently for which his blood pressure was found to be elevated at 190 7/99  I was able to contact his PCP  Dr Claudia Eddy said that he will contact patient and possibly lower or have him stop the midodrine  Other Visit Diagnoses     COPD exacerbation ([facility] )        Relevant Medications    predniSONE 20 mg tablet            Return in about 6 weeks (around 6/8/2022)  All questions are answered to the patient's satisfaction and understanding  He verbalizes understanding  He is encouraged to call with any further questions or concerns  Portions of the record may have been created with voice recognition software    Occasional wrong word or "sound a like" substitutions may have occurred due to the inherent limitations of voice recognition software  Read the chart carefully and recognize, using context, where substitutions have occurred  Electronically Signed by TOPHER Gonzalez    ______________________________________________________________________    Chief Complaint: No chief complaint on file  Patient ID: Rolly Hussein is a 65 y o  y o  male has a past medical history of Anesthesia complication, Arthritis, BPH (benign prostatic hyperplasia), Cancer ([facility] ), COPD (chronic obstructive pulmonary disease) ([facility] ), COPD exacerbation ([facility] ) (12/29/2019), Full dentures, Hiatal hernia, History of methicillin resistant staphylococcus aureus (MRSA), Hypertension, Irritable bowel syndrome, Kidney stone, Liver disease, Pulmonary emphysema ([facility] ), RSV infection (12/2017), and Wears glasses  4/27/2022  Patient presents today for follow-up visit  HPI Rolly Hussein is a 59-year-old male who became short of breath and was last seen in the emergency department on April 21, 2022  Apparently he was in physical therapy and became very short of breath  He has been having elevated blood pressure readings with his last blood pressure in the /99  Patient was given IV Solu-Medrol  Patient was given 20 mg of prednisone and has recently completed  Patient has history of alpha 1 anti trypsin deficiency and chronic respiratory failure with hypoxia  He also has centrilobular emphysema  Review of Systems   Constitutional: Negative  HENT: Negative  Eyes: Negative  Respiratory: Positive for shortness of breath and wheezing  Cardiovascular: Negative  Gastrointestinal: Negative  Endocrine: Negative  Genitourinary: Negative  Musculoskeletal: Negative  Skin: Negative  Allergic/Immunologic: Negative  Neurological: Negative  Hematological: Negative  Psychiatric/Behavioral: Negative          Smoking history: He reports that he quit smoking about 4 years ago  He has a 60 00 pack-year smoking history  He has never used smokeless tobacco     The following portions of the patient's history were reviewed and updated as appropriate: current medications, past family history, past medical history, past social history, past surgical history and problem list     Immunization History   Administered Date(s) Administered    COVID-19 MODERNA VACC 0 5 ML IM 01/21/2021, 03/01/2021, 12/08/2021    INFLUENZA 09/27/2016, 09/27/2018, 09/28/2019, 09/25/2020, 09/29/2021    Influenza, seasonal, injectable 09/27/2013, 10/23/2014    Pneumococcal Conjugate 13-Valent 04/26/2016    Pneumococcal Polysaccharide PPV23 04/23/2011    Tdap 04/26/2016    Zoster 10/29/2014, 04/27/2015, 07/24/2018    Zoster Vaccine Recombinant 05/24/2018, 07/24/2018     Current Outpatient Medications   Medication Sig Dispense Refill    amLODIPine (NORVASC) 10 mg tablet TAKE ONE TABLET BY MOUTH DAILY  30 tablet 6    busPIRone (BUSPAR) 10 mg tablet TAKE ONE TABLET BY MOUTH THREE TIMES DAILY  90 tablet 3    carbamide peroxide (DEBROX) 6 5 % otic solution Administer 5 drops into both ears 2 (two) times a day as needed for ear pain 15 mL 0    carbidopa-levodopa (SINEMET)  mg per tablet TAKE HALF TABLET BY MOUTH 30 minutes prior to breakfast, lunch, and dinner for 1 week   then increase to 1 tablet prior to each meal 90 tablet 0    cholestyramine (QUESTRAN) 4 g packet Take 1 packet by mouth daily      doxylamine ([employer] SleepTabs) 25 MG tablet Take 25 mg by mouth daily at bedtime as needed for sleep      finasteride (PROSCAR) 5 mg tablet TAKE ONE TABLET BY MOUTH IN THE MORNING  90 tablet 3    fluticasone (FLONASE) 50 mcg/act nasal spray 2 sprays into each nostril daily 16 g 5    gabapentin (NEURONTIN) 300 mg capsule TAKE ONE CAPSULE BY MOUTH THREE TIMES DAILY 90 capsule 0    ipratropium-albuterol (DUO-NEB) 0 5-2 5 mg/3 mL nebulizer solution Take 3 mL by nebulization 4 (four) times a day 360 mL 5    LORazepam (ATIVAN) 1 mg tablet       midodrine (PROAMATINE) 10 MG tablet Take 1 tab three times daily  Please hold medication if blood pressure is above 049 systolic  90 tablet 4    mirtazapine (REMERON) 15 mg tablet Take 1 tablet (15 mg total) by mouth daily at bedtime 30 tablet 5    OXYGEN-HELIUM IN Inhale 2L at rest- 3L with activity      pantoprazole (PROTONIX) 40 mg tablet TAKE ONE TABLET BY MOUTH TWICE DAILY 180 tablet 0    tamsulosin (FLOMAX) 0 4 mg TAKE ONE CAPSULE BY MOUTH DAILY 90 capsule 0    Ventolin  (90 Base) MCG/ACT inhaler Inhale 2 puffs every 4 (four) hours as needed for wheezing 18 g 5    ZEMAIRA infusion once a week       budesonide (PULMICORT) 0 5 mg/2 mL nebulizer solution Take 2 mL (0 5 mg total) by nebulization 2 (two) times a day 360 mL 11    Diclofenac Sodium (VOLTAREN) 1 % Apply 2 g topically 4 (four) times a day for 7 days Apply to R knee 56 g 0    predniSONE 20 mg tablet Three tablets daily for 3 days, 2 tablets daily for 3 days, 1 tablet daily for 10 days 20 tablet 0     No current facility-administered medications for this visit  Allergies: Chantix [varenicline]    Objective:  Vitals:    04/27/22 1414   BP: (!) 174/98   Pulse: 77   Resp: 12   Temp: 97 7 °F (36 5 °C)   TempSrc: Temporal   SpO2: 95%   Weight: 80 9 kg (178 lb 6 4 oz)   Height: 6' 5" (1 956 m)   Oxygen Therapy  SpO2: 95 %    Wt Readings from Last 3 Encounters:   04/27/22 80 9 kg (178 lb 6 4 oz)   04/21/22 81 kg (178 lb 9 2 oz)   04/05/22 81 8 kg (180 lb 6 4 oz)     Body mass index is 21 16 kg/m²  Physical Exam  Constitutional:       Appearance: He is normal weight  He is ill-appearing  HENT:      Head: Normocephalic  Nose: Nose normal       Mouth/Throat:      Mouth: Mucous membranes are dry  Eyes:      Pupils: Pupils are equal, round, and reactive to light  Cardiovascular:      Rate and Rhythm: Normal rate and regular rhythm  Pulses: Normal pulses     Pulmonary:      Effort: Respiratory distress present  Comments: Patient has tachypnea  O2 sat is at 95% on 3 L  Musculoskeletal:         General: Normal range of motion  Skin:     General: Skin is warm and dry  Capillary Refill: Capillary refill takes less than 2 seconds  Neurological:      General: No focal deficit present  Mental Status: He is alert     Psychiatric:         Mood and Affect: Mood normal          Lab Review:   Admission on 04/21/2022, Discharged on 04/21/2022   Component Date Value    WBC 04/21/2022 8 69     RBC 04/21/2022 4 60     Hemoglobin 04/21/2022 14 3     Hematocrit 04/21/2022 42 6     MCV 04/21/2022 93     MCH 04/21/2022 31 1     MCHC 04/21/2022 33 6     RDW 04/21/2022 13 6     MPV 04/21/2022 9 1     Platelets 32/91/3895 142*    nRBC 04/21/2022 0     Neutrophils Relative 04/21/2022 53     Immat GRANS % 04/21/2022 0     Lymphocytes Relative 04/21/2022 37     Monocytes Relative 04/21/2022 6     Eosinophils Relative 04/21/2022 3     Basophils Relative 04/21/2022 1     Neutrophils Absolute 04/21/2022 4 57     Immature Grans Absolute 04/21/2022 0 03     Lymphocytes Absolute 04/21/2022 3 24     Monocytes Absolute 04/21/2022 0 51     Eosinophils Absolute 04/21/2022 0 27     Basophils Absolute 04/21/2022 0 07     Sodium 04/21/2022 144     Potassium 04/21/2022 4 2     Chloride 04/21/2022 107     CO2 04/21/2022 24     ANION GAP 04/21/2022 13     BUN 04/21/2022 11     Creatinine 04/21/2022 1 20     Glucose 04/21/2022 84     Calcium 04/21/2022 9 0     AST 04/21/2022 21     ALT 04/21/2022 33     Alkaline Phosphatase 04/21/2022 90     Total Protein 04/21/2022 7 0     Albumin 04/21/2022 3 9     Total Bilirubin 04/21/2022 0 98     eGFR 04/21/2022 57     Protime 04/21/2022 13 2     INR 04/21/2022 1 02     PTT 04/21/2022 33     hs TnI 0hr 04/21/2022 6     NT-proBNP 04/21/2022 346     hs TnI 2hr 04/21/2022 7     Delta 2hr hsTnI 04/21/2022 1     Ventricular Rate 04/21/2022 87     Atrial Rate 04/21/2022 87     WI Interval 04/21/2022 162     QRSD Interval 04/21/2022 98     QT Interval 04/21/2022 374     QTC Interval 04/21/2022 450     P Axis 04/21/2022 95     QRS Axis 04/21/2022 13     T Wave [place] 04/21/2022 34    Admission on 11/17/2021, Discharged on 11/18/2021   Component Date Value    WBC 11/17/2021 7 67     RBC 11/17/2021 4 31     Hemoglobin 11/17/2021 14 0     Hematocrit 11/17/2021 40 4     MCV 11/17/2021 94     MCH 11/17/2021 32 5     MCHC 11/17/2021 34 7     RDW 11/17/2021 13 2     MPV 11/17/2021 8 6*    Platelets 95/52/7170 129*    nRBC 11/17/2021 0     Neutrophils Relative 11/17/2021 65     Immat GRANS % 11/17/2021 0     Lymphocytes Relative 11/17/2021 27     Monocytes Relative 11/17/2021 6     Eosinophils Relative 11/17/2021 1     Basophils Relative 11/17/2021 1     Neutrophils Absolute 11/17/2021 4 96     Immature Grans Absolute 11/17/2021 0 03     Lymphocytes Absolute 11/17/2021 2 08     Monocytes Absolute 11/17/2021 0 49     Eosinophils Absolute 11/17/2021 0 07     Basophils Absolute 11/17/2021 0 04     Protime 11/17/2021 13 7     INR 11/17/2021 1 07     PTT 11/17/2021 33     Sodium 11/17/2021 138     Potassium 11/17/2021 3 8     Chloride 11/17/2021 103     CO2 11/17/2021 26     ANION GAP 11/17/2021 9     BUN 11/17/2021 17     Creatinine 11/17/2021 1 21     Glucose 11/17/2021 96     Calcium 11/17/2021 9 6     AST 11/17/2021 24     ALT 11/17/2021 23     Alkaline Phosphatase 11/17/2021 82     Total Protein 11/17/2021 7 2     Albumin 11/17/2021 4 2     Total Bilirubin 11/17/2021 0 84     eGFR 11/17/2021 57     D-Dimer, Quant 11/17/2021 0 80*    hs TnI 0hr 11/17/2021 6     NT-proBNP 11/17/2021 347     hs TnI 2hr 11/17/2021 9     Delta 2hr hsTnI 11/17/2021 3     hs TnI 4hr 11/17/2021 10     Delta 4hr hsTnI 11/17/2021 4     Sodium 11/18/2021 139     Potassium 11/18/2021 3 5     Chloride 11/18/2021 104     CO2 11/18/2021 26     ANION GAP 11/18/2021 9     BUN 11/18/2021 14     Creatinine 11/18/2021 1 12     Glucose 11/18/2021 97     Calcium 11/18/2021 9 1     eGFR 11/18/2021 63     Magnesium 11/18/2021 2 0     Phosphorus 11/18/2021 3 8     WBC 11/18/2021 7 13     RBC 11/18/2021 3 93     Hemoglobin 11/18/2021 12 5     Hematocrit 11/18/2021 37 4     MCV 11/18/2021 95     MCH 11/18/2021 31 8     MCHC 11/18/2021 33 4     RDW 11/18/2021 13 2     MPV 11/18/2021 10 2     Platelets 63/08/5475 127*    nRBC 11/18/2021 0     Neutrophils Relative 11/18/2021 44     Immat GRANS % 11/18/2021 0     Lymphocytes Relative 11/18/2021 43     Monocytes Relative 11/18/2021 9     Eosinophils Relative 11/18/2021 3     Basophils Relative 11/18/2021 1     Neutrophils Absolute 11/18/2021 3 14     Immature Grans Absolute 11/18/2021 0 02     Lymphocytes Absolute 11/18/2021 3 09     Monocytes Absolute 11/18/2021 0 61     Eosinophils Absolute 11/18/2021 0 21     Basophils Absolute 11/18/2021 0 06     Ventricular Rate 11/17/2021 85     Atrial Rate 11/17/2021 85     MT Interval 11/17/2021 150     QRSD Interval 11/17/2021 100     QT Interval 11/17/2021 396     QTC Interval 11/17/2021 471     QRS Axis 11/17/2021 51     T Wave Axis 11/17/2021 24    Office Visit on 11/04/2021   Component Date Value     RAPID STREP A 11/04/2021 Negative        Past Surgical History:   Procedure Laterality Date    BACK SURGERY  2008    discectomy    COLONOSCOPY      COLONOSCOPY N/A 3/10/2017    Procedure: Dillan Otto;  Surgeon: Mingo Barfield MD;  Location: [facility] GI LAB; Service:    Star [place]      removal of kidney stones    ESOPHAGOGASTRODUODENOSCOPY N/A 3/10/2017    Procedure: ESOPHAGOGASTRODUODENOSCOPY (EGD); Surgeon: Mingo Barfield MD;  Location: [facility]; Service:     LITHOTRIPSY      MT ESOPHAGOGASTRODUODENOSCOPY TRANSORAL DIAGNOSTIC N/A 11/20/2018    Procedure: ESOPHAGOGASTRODUODENOSCOPY (EGD);   Surgeon: Tatyana Pulido MD;  Location: [facility] GI LAB; Service: Gastroenterology    TONSILLECTOMY      VEIN LIGATION AND STRIPPING Bilateral     17's        Family History   Problem Relation Age of Onset    Emphysema Mother         never smoked    Emphysema Father     Cancer Brother         colon    Colon cancer Brother     Ulcerative colitis Family     Liver disease Family         Diagnostics:  I have personally reviewed pertinent films in PACS    Office Spirometry Results:     ESS:    XR chest 1 view portable    Result Date: 4/21/2022  Narrative: CHEST INDICATION:   sob  COMPARISON:  11/17/2021 EXAM PERFORMED/VIEWS:  XR CHEST PORTABLE FINDINGS: Heart shadow appears unremarkable  Atherosclerotic vascular calcifications are noted  The lungs are clear  No pneumothorax or pleural effusion  Osseous structures appear within normal limits for patient age  Impression: No acute cardiopulmonary disease   Workstation performed: [id number]

## 2022-04-27 NOTE — PATIENT INSTRUCTIONS
He will be taken 20 mg tablets of prednisone    Take 3 tablets a day a for 3 days then 2 tablets a day for 3 days and then 1 tablet a day for 10 days  Your to follow up with your primary care physician about your elevated blood pressure

## 2022-04-28 ENCOUNTER — PATIENT OUTREACH (OUTPATIENT)
Dept: FAMILY MEDICINE CLINIC | Facility: CLINIC | Age: 77
End: 2022-04-28

## 2022-04-28 ENCOUNTER — APPOINTMENT (INPATIENT)
Dept: RADIOLOGY | Facility: HOSPITAL | Age: 77
DRG: 189 | End: 2022-04-28
Payer: MEDICARE

## 2022-04-28 ENCOUNTER — APPOINTMENT (OUTPATIENT)
Dept: PHYSICAL THERAPY | Facility: CLINIC | Age: 77
End: 2022-04-28
Payer: COMMERCIAL

## 2022-04-28 ENCOUNTER — APPOINTMENT (EMERGENCY)
Dept: RADIOLOGY | Facility: HOSPITAL | Age: 77
DRG: 189 | End: 2022-04-28
Payer: MEDICARE

## 2022-04-28 ENCOUNTER — HOSPITAL ENCOUNTER (INPATIENT)
Facility: HOSPITAL | Age: 77
LOS: 6 days | Discharge: NURSING, REHAB OR LONG-TERM CARE | DRG: 189 | End: 2022-05-04
Attending: EMERGENCY MEDICINE | Admitting: STUDENT IN AN ORGANIZED HEALTH CARE EDUCATION/TRAINING PROGRAM
Payer: MEDICARE

## 2022-04-28 ENCOUNTER — APPOINTMENT (OUTPATIENT)
Dept: OCCUPATIONAL THERAPY | Facility: CLINIC | Age: 77
End: 2022-04-28
Payer: COMMERCIAL

## 2022-04-28 DIAGNOSIS — E88.01 AAT (ALPHA-1-ANTITRYPSIN) DEFICIENCY (HCC): Chronic | ICD-10-CM

## 2022-04-28 DIAGNOSIS — R07.89 CHEST HEAVINESS: ICD-10-CM

## 2022-04-28 DIAGNOSIS — J96.11 CHRONIC RESPIRATORY FAILURE WITH HYPOXIA (HCC): ICD-10-CM

## 2022-04-28 DIAGNOSIS — J43.2 CENTRILOBULAR EMPHYSEMA (HCC): Chronic | ICD-10-CM

## 2022-04-28 DIAGNOSIS — J43.8 OTHER EMPHYSEMA (HCC): ICD-10-CM

## 2022-04-28 DIAGNOSIS — I10 ESSENTIAL HYPERTENSION: ICD-10-CM

## 2022-04-28 DIAGNOSIS — J44.1 COPD EXACERBATION (HCC): Primary | ICD-10-CM

## 2022-04-28 DIAGNOSIS — I27.20 PULMONARY HYPERTENSION (HCC): ICD-10-CM

## 2022-04-28 PROBLEM — I16.0 HYPERTENSIVE URGENCY: Status: ACTIVE | Noted: 2022-04-28

## 2022-04-28 LAB
2HR DELTA HS TROPONIN: -4 NG/L
4HR DELTA HS TROPONIN: -8 NG/L
ALBUMIN SERPL BCP-MCNC: 3.9 G/DL (ref 3.5–5)
ALP SERPL-CCNC: 76 U/L (ref 46–116)
ALT SERPL W P-5'-P-CCNC: 45 U/L (ref 12–78)
ANION GAP SERPL CALCULATED.3IONS-SCNC: 8 MMOL/L (ref 4–13)
APTT PPP: 29 SECONDS (ref 23–37)
ARTERIAL PATENCY WRIST A: YES
AST SERPL W P-5'-P-CCNC: 25 U/L (ref 5–45)
BASE EXCESS BLDA CALC-SCNC: 2.3 MMOL/L
BASOPHILS # BLD AUTO: 0.04 THOUSANDS/ΜL (ref 0–0.1)
BASOPHILS NFR BLD AUTO: 1 % (ref 0–1)
BILIRUB SERPL-MCNC: 1.21 MG/DL (ref 0.2–1)
BODY TEMPERATURE: 96 DEGREES FEHRENHEIT
BUN SERPL-MCNC: 21 MG/DL (ref 5–25)
CALCIUM SERPL-MCNC: 9 MG/DL (ref 8.3–10.1)
CARDIAC TROPONIN I PNL SERPL HS: 20 NG/L
CARDIAC TROPONIN I PNL SERPL HS: 24 NG/L
CARDIAC TROPONIN I PNL SERPL HS: 28 NG/L
CHLORIDE SERPL-SCNC: 103 MMOL/L (ref 100–108)
CO2 SERPL-SCNC: 30 MMOL/L (ref 21–32)
CREAT SERPL-MCNC: 1.23 MG/DL (ref 0.6–1.3)
EOSINOPHIL # BLD AUTO: 0.19 THOUSAND/ΜL (ref 0–0.61)
EOSINOPHIL NFR BLD AUTO: 3 % (ref 0–6)
ERYTHROCYTE [DISTWIDTH] IN BLOOD BY AUTOMATED COUNT: 13.9 % (ref 11.6–15.1)
FLUAV RNA RESP QL NAA+PROBE: NEGATIVE
FLUBV RNA RESP QL NAA+PROBE: NEGATIVE
GFR SERPL CREATININE-BSD FRML MDRD: 55 ML/MIN/1.73SQ M
GLUCOSE SERPL-MCNC: 115 MG/DL (ref 65–140)
HCO3 BLDA-SCNC: 26.9 MMOL/L (ref 22–28)
HCT VFR BLD AUTO: 44.1 % (ref 36.5–49.3)
HGB BLD-MCNC: 14.7 G/DL (ref 12–17)
IMM GRANULOCYTES # BLD AUTO: 0.02 THOUSAND/UL (ref 0–0.2)
IMM GRANULOCYTES NFR BLD AUTO: 0 % (ref 0–2)
INR PPP: 1.1 (ref 0.84–1.19)
IPAP: 12
LYMPHOCYTES # BLD AUTO: 2.78 THOUSANDS/ΜL (ref 0.6–4.47)
LYMPHOCYTES NFR BLD AUTO: 44 % (ref 14–44)
MAGNESIUM SERPL-MCNC: 2 MG/DL (ref 1.6–2.6)
MCH RBC QN AUTO: 31.4 PG (ref 26.8–34.3)
MCHC RBC AUTO-ENTMCNC: 33.3 G/DL (ref 31.4–37.4)
MCV RBC AUTO: 94 FL (ref 82–98)
MONOCYTES # BLD AUTO: 0.4 THOUSAND/ΜL (ref 0.17–1.22)
MONOCYTES NFR BLD AUTO: 6 % (ref 4–12)
NEUTROPHILS # BLD AUTO: 2.89 THOUSANDS/ΜL (ref 1.85–7.62)
NEUTS SEG NFR BLD AUTO: 46 % (ref 43–75)
NON VENT- BIPAP: ABNORMAL
NRBC BLD AUTO-RTO: 0 /100 WBCS
NT-PROBNP SERPL-MCNC: 429 PG/ML
O2 CT BLDA-SCNC: 20.4 ML/DL (ref 16–23)
OXYHGB MFR BLDA: 98.7 % (ref 94–97)
PCO2 BLDA: 41.4 MM HG (ref 36–44)
PCO2 TEMP ADJ BLDA: 38.9 MM HG (ref 36–44)
PEEP MAX SETTING VENT: 8 CM[H2O]
PH BLD: 7.45 [PH] (ref 7.35–7.45)
PH BLDA: 7.43 [PH] (ref 7.35–7.45)
PLATELET # BLD AUTO: 118 THOUSANDS/UL (ref 149–390)
PMV BLD AUTO: 9 FL (ref 8.9–12.7)
PO2 BLD: 163.6 MM HG (ref 75–129)
PO2 BLDA: 171.2 MM HG (ref 75–129)
POTASSIUM SERPL-SCNC: 4.2 MMOL/L (ref 3.5–5.3)
PROT SERPL-MCNC: 6.7 G/DL (ref 6.4–8.2)
PROTHROMBIN TIME: 14 SECONDS (ref 11.6–14.5)
RBC # BLD AUTO: 4.68 MILLION/UL (ref 3.88–5.62)
RSV RNA RESP QL NAA+PROBE: NEGATIVE
SARS-COV-2 RNA RESP QL NAA+PROBE: NEGATIVE
SODIUM SERPL-SCNC: 141 MMOL/L (ref 136–145)
SPECIMEN SOURCE: ABNORMAL
VENT BIPAP FIO2: 50 %
WBC # BLD AUTO: 6.32 THOUSAND/UL (ref 4.31–10.16)

## 2022-04-28 PROCEDURE — 85025 COMPLETE CBC W/AUTO DIFF WBC: CPT | Performed by: EMERGENCY MEDICINE

## 2022-04-28 PROCEDURE — G1004 CDSM NDSC: HCPCS

## 2022-04-28 PROCEDURE — 71045 X-RAY EXAM CHEST 1 VIEW: CPT

## 2022-04-28 PROCEDURE — 84484 ASSAY OF TROPONIN QUANT: CPT | Performed by: EMERGENCY MEDICINE

## 2022-04-28 PROCEDURE — 80053 COMPREHEN METABOLIC PANEL: CPT | Performed by: EMERGENCY MEDICINE

## 2022-04-28 PROCEDURE — 94762 N-INVAS EAR/PLS OXIMTRY CONT: CPT

## 2022-04-28 PROCEDURE — 94644 CONT INHLJ TX 1ST HOUR: CPT

## 2022-04-28 PROCEDURE — 99285 EMERGENCY DEPT VISIT HI MDM: CPT

## 2022-04-28 PROCEDURE — 36600 WITHDRAWAL OF ARTERIAL BLOOD: CPT

## 2022-04-28 PROCEDURE — 85730 THROMBOPLASTIN TIME PARTIAL: CPT | Performed by: EMERGENCY MEDICINE

## 2022-04-28 PROCEDURE — 83880 ASSAY OF NATRIURETIC PEPTIDE: CPT | Performed by: EMERGENCY MEDICINE

## 2022-04-28 PROCEDURE — 83735 ASSAY OF MAGNESIUM: CPT | Performed by: EMERGENCY MEDICINE

## 2022-04-28 PROCEDURE — 94760 N-INVAS EAR/PLS OXIMETRY 1: CPT

## 2022-04-28 PROCEDURE — 0241U HB NFCT DS VIR RESP RNA 4 TRGT: CPT | Performed by: EMERGENCY MEDICINE

## 2022-04-28 PROCEDURE — 99291 CRITICAL CARE FIRST HOUR: CPT | Performed by: EMERGENCY MEDICINE

## 2022-04-28 PROCEDURE — 94640 AIRWAY INHALATION TREATMENT: CPT

## 2022-04-28 PROCEDURE — 94002 VENT MGMT INPAT INIT DAY: CPT

## 2022-04-28 PROCEDURE — 82805 BLOOD GASES W/O2 SATURATION: CPT | Performed by: EMERGENCY MEDICINE

## 2022-04-28 PROCEDURE — 93005 ELECTROCARDIOGRAM TRACING: CPT

## 2022-04-28 PROCEDURE — 71275 CT ANGIOGRAPHY CHEST: CPT

## 2022-04-28 PROCEDURE — 36415 COLL VENOUS BLD VENIPUNCTURE: CPT | Performed by: EMERGENCY MEDICINE

## 2022-04-28 PROCEDURE — 99222 1ST HOSP IP/OBS MODERATE 55: CPT | Performed by: STUDENT IN AN ORGANIZED HEALTH CARE EDUCATION/TRAINING PROGRAM

## 2022-04-28 PROCEDURE — 85610 PROTHROMBIN TIME: CPT | Performed by: EMERGENCY MEDICINE

## 2022-04-28 RX ORDER — CHOLESTYRAMINE LIGHT 4 G/5.7G
4 POWDER, FOR SUSPENSION ORAL DAILY
Status: DISCONTINUED | OUTPATIENT
Start: 2022-04-28 | End: 2022-05-04 | Stop reason: HOSPADM

## 2022-04-28 RX ORDER — TAMSULOSIN HYDROCHLORIDE 0.4 MG/1
0.4 CAPSULE ORAL DAILY
Status: DISCONTINUED | OUTPATIENT
Start: 2022-04-28 | End: 2022-05-04 | Stop reason: HOSPADM

## 2022-04-28 RX ORDER — MIDODRINE HYDROCHLORIDE 5 MG/1
10 TABLET ORAL
Status: DISCONTINUED | OUTPATIENT
Start: 2022-04-28 | End: 2022-04-28

## 2022-04-28 RX ORDER — FLUTICASONE PROPIONATE 50 MCG
2 SPRAY, SUSPENSION (ML) NASAL DAILY
Status: DISCONTINUED | OUTPATIENT
Start: 2022-04-28 | End: 2022-05-04 | Stop reason: HOSPADM

## 2022-04-28 RX ORDER — AMLODIPINE BESYLATE 10 MG/1
10 TABLET ORAL DAILY
Status: DISCONTINUED | OUTPATIENT
Start: 2022-04-28 | End: 2022-05-04 | Stop reason: HOSPADM

## 2022-04-28 RX ORDER — HYDRALAZINE HYDROCHLORIDE 20 MG/ML
10 INJECTION INTRAMUSCULAR; INTRAVENOUS ONCE
Status: COMPLETED | OUTPATIENT
Start: 2022-04-28 | End: 2022-04-28

## 2022-04-28 RX ORDER — ALBUTEROL SULFATE 90 UG/1
2 AEROSOL, METERED RESPIRATORY (INHALATION) EVERY 4 HOURS PRN
Status: DISCONTINUED | OUTPATIENT
Start: 2022-04-28 | End: 2022-05-04

## 2022-04-28 RX ORDER — BUSPIRONE HYDROCHLORIDE 10 MG/1
10 TABLET ORAL 3 TIMES DAILY
Status: DISCONTINUED | OUTPATIENT
Start: 2022-04-28 | End: 2022-05-04 | Stop reason: HOSPADM

## 2022-04-28 RX ORDER — GABAPENTIN 300 MG/1
300 CAPSULE ORAL 3 TIMES DAILY
Status: DISCONTINUED | OUTPATIENT
Start: 2022-04-28 | End: 2022-05-04 | Stop reason: HOSPADM

## 2022-04-28 RX ORDER — LORAZEPAM 1 MG/1
1 TABLET ORAL DAILY PRN
Status: COMPLETED | OUTPATIENT
Start: 2022-04-28 | End: 2022-04-29

## 2022-04-28 RX ORDER — BUDESONIDE 0.5 MG/2ML
0.5 INHALANT ORAL
Status: DISCONTINUED | OUTPATIENT
Start: 2022-04-28 | End: 2022-05-04 | Stop reason: HOSPADM

## 2022-04-28 RX ORDER — FINASTERIDE 5 MG/1
5 TABLET, FILM COATED ORAL EVERY MORNING
Status: DISCONTINUED | OUTPATIENT
Start: 2022-04-29 | End: 2022-05-04 | Stop reason: HOSPADM

## 2022-04-28 RX ORDER — PANTOPRAZOLE SODIUM 40 MG/1
40 TABLET, DELAYED RELEASE ORAL 2 TIMES DAILY
Status: DISCONTINUED | OUTPATIENT
Start: 2022-04-28 | End: 2022-05-04 | Stop reason: HOSPADM

## 2022-04-28 RX ORDER — ACETAMINOPHEN 325 MG/1
650 TABLET ORAL EVERY 6 HOURS PRN
Status: DISCONTINUED | OUTPATIENT
Start: 2022-04-28 | End: 2022-05-04 | Stop reason: HOSPADM

## 2022-04-28 RX ORDER — IPRATROPIUM BROMIDE AND ALBUTEROL SULFATE 2.5; .5 MG/3ML; MG/3ML
3 SOLUTION RESPIRATORY (INHALATION) ONCE
Status: COMPLETED | OUTPATIENT
Start: 2022-04-28 | End: 2022-04-28

## 2022-04-28 RX ORDER — IPRATROPIUM BROMIDE AND ALBUTEROL SULFATE .5; 3 MG/3ML; MG/3ML
1 SOLUTION RESPIRATORY (INHALATION) ONCE
Status: COMPLETED | OUTPATIENT
Start: 2022-04-28 | End: 2022-04-28

## 2022-04-28 RX ORDER — METHYLPREDNISOLONE SOD SUCC 125 MG
1 VIAL (EA) INJECTION ONCE
Status: COMPLETED | OUTPATIENT
Start: 2022-04-28 | End: 2022-04-28

## 2022-04-28 RX ORDER — ALBUTEROL SULFATE 2.5 MG/3ML
1 SOLUTION RESPIRATORY (INHALATION) ONCE
Status: COMPLETED | OUTPATIENT
Start: 2022-04-28 | End: 2022-04-28

## 2022-04-28 RX ORDER — IPRATROPIUM BROMIDE AND ALBUTEROL SULFATE 2.5; .5 MG/3ML; MG/3ML
3 SOLUTION RESPIRATORY (INHALATION)
Status: DISCONTINUED | OUTPATIENT
Start: 2022-04-28 | End: 2022-05-04

## 2022-04-28 RX ORDER — MIRTAZAPINE 15 MG/1
15 TABLET, FILM COATED ORAL
Status: DISCONTINUED | OUTPATIENT
Start: 2022-04-28 | End: 2022-05-04 | Stop reason: HOSPADM

## 2022-04-28 RX ADMIN — IPRATROPIUM BROMIDE 1 MG: 0.5 SOLUTION RESPIRATORY (INHALATION) at 11:35

## 2022-04-28 RX ADMIN — IPRATROPIUM BROMIDE AND ALBUTEROL SULFATE 3 ML: 2.5; .5 SOLUTION RESPIRATORY (INHALATION) at 13:47

## 2022-04-28 RX ADMIN — ENOXAPARIN SODIUM 40 MG: 40 INJECTION SUBCUTANEOUS at 16:23

## 2022-04-28 RX ADMIN — IPRATROPIUM BROMIDE AND ALBUTEROL SULFATE 3 ML: 2.5; .5 SOLUTION RESPIRATORY (INHALATION) at 15:37

## 2022-04-28 RX ADMIN — MIRTAZAPINE 15 MG: 15 TABLET, FILM COATED ORAL at 21:42

## 2022-04-28 RX ADMIN — FLUTICASONE PROPIONATE 2 SPRAY: 50 SPRAY, METERED NASAL at 16:28

## 2022-04-28 RX ADMIN — CARBIDOPA AND LEVODOPA 1 TABLET: 25; 100 TABLET ORAL at 16:20

## 2022-04-28 RX ADMIN — ALBUTEROL SULFATE 10 MG: 2.5 SOLUTION RESPIRATORY (INHALATION) at 11:35

## 2022-04-28 RX ADMIN — BUSPIRONE HYDROCHLORIDE 10 MG: 10 TABLET ORAL at 16:21

## 2022-04-28 RX ADMIN — HYDRALAZINE HYDROCHLORIDE 10 MG: 20 INJECTION INTRAMUSCULAR; INTRAVENOUS at 13:37

## 2022-04-28 RX ADMIN — IPRATROPIUM BROMIDE AND ALBUTEROL SULFATE 3 ML: 2.5; .5 SOLUTION RESPIRATORY (INHALATION) at 20:47

## 2022-04-28 RX ADMIN — PANTOPRAZOLE SODIUM 40 MG: 20 TABLET, DELAYED RELEASE ORAL at 17:18

## 2022-04-28 RX ADMIN — CHOLESTYRAMINE 4 G: 4 POWDER, FOR SUSPENSION ORAL at 16:21

## 2022-04-28 RX ADMIN — GABAPENTIN 300 MG: 300 CAPSULE ORAL at 16:20

## 2022-04-28 RX ADMIN — GABAPENTIN 300 MG: 300 CAPSULE ORAL at 21:42

## 2022-04-28 RX ADMIN — BUSPIRONE HYDROCHLORIDE 10 MG: 10 TABLET ORAL at 21:42

## 2022-04-28 RX ADMIN — BUDESONIDE 0.5 MG: 0.5 INHALANT ORAL at 20:47

## 2022-04-28 RX ADMIN — TAMSULOSIN HYDROCHLORIDE 0.4 MG: 0.4 CAPSULE ORAL at 16:20

## 2022-04-28 RX ADMIN — IOHEXOL 85 ML: 350 INJECTION, SOLUTION INTRAVENOUS at 14:21

## 2022-04-28 RX ADMIN — AMLODIPINE BESYLATE 10 MG: 10 TABLET ORAL at 16:20

## 2022-04-28 NOTE — PROGRESS NOTES
[facility] had received a call from the patient  Patient appeared to have SOB on the phone  [facility] notes patient had called the provider on this prior to her  SWCM encouraged the patient go to the ED  Patient refused to go  Patient said "I will see if it passes"  Patient stated he was just in the ED last week for same and they didn't do anything  Patient stated he has the [place] coming out to see him today at 2pm for the [employer] application  [facility] informed the patent that she can reach out to her to reschedule this appointment  Patient agreed to this  [facility] had gone to get RN MITUL Rosen who was speaking with Sondra Garner about this patient  Both spoke with the patient and coordinated 911 ambulance to pick him up at his home  [facility] sent a message to Maritza of the above  Myna Hoop will reschedule appointment Maritza to reach out to patient for new appointment  [facility] will continue to f/u

## 2022-04-28 NOTE — PROGRESS NOTES
Chart Review and SW message  04/28/2022    SW contacted [facility] to advise that patient will be heading to hospital, [facility] will not be able to conduct HV today as scheduled  Will call patient soon to reschedule      Next outreach is scheduled for 05/03/2022

## 2022-04-28 NOTE — Clinical Note
Prepped: right groin, right radial and right brachial  Prepped with: ChloraPrep  The site was clipped  The patient was draped

## 2022-04-28 NOTE — PLAN OF CARE
Initial assessment  Problem: Potential for Falls  Goal: Patient will remain free of falls  Description: INTERVENTIONS:  - Educate patient/family on patient safety including physical limitations  - Instruct patient to call for assistance with activity   - Consult OT/PT to assist with strengthening/mobility   - Keep Call bell within reach  - Keep bed low and locked with side rails adjusted as appropriate  - Keep care items and personal belongings within reach  - Initiate and maintain comfort rounds  - Make Fall Risk Sign visible to staff  - Apply yellow socks and bracelet for high fall risk patients  - Consider moving patient to room near nurses station  Outcome: Progressing     Problem: Nutrition/Hydration-ADULT  Goal: Nutrient/Hydration intake appropriate for improving, restoring or maintaining nutritional needs  Description: Monitor and assess patient's nutrition/hydration status for malnutrition  Collaborate with interdisciplinary team and initiate plan and interventions as ordered  Monitor patient's weight and dietary intake as ordered or per policy  Utilize nutrition screening tool and intervene as necessary  Determine patient's food preferences and provide high-protein, high-caloric foods as appropriate       INTERVENTIONS:  - Monitor oral intake, urinary output, labs, and treatment plans  - Assess nutrition and hydration status and recommend course of action  - Evaluate amount of meals eaten  - Assist patient with eating if necessary   - Allow adequate time for meals  - Recommend/ encourage appropriate diets, oral nutritional supplements, and vitamin/mineral supplements  - Order, calculate, and assess calorie counts as needed  - Recommend, monitor, and adjust tube feedings and TPN/PPN based on assessed needs  - Assess need for intravenous fluids  - Provide specific nutrition/hydration education as appropriate  - Include patient/family/caregiver in decisions related to nutrition  Outcome: Progressing Problem: RESPIRATORY - ADULT  Goal: Achieves optimal ventilation and oxygenation  Description: INTERVENTIONS:  - Assess for changes in respiratory status  - Assess for changes in mentation and behavior  - Position to facilitate oxygenation and minimize respiratory effort  - Oxygen administered by appropriate delivery if ordered  - Initiate smoking cessation education as indicated  - Encourage broncho-pulmonary hygiene including cough, deep breathe, Incentive Spirometry  - Assess the need for suctioning and aspirate as needed  - Assess and instruct to report SOB or any respiratory difficulty  - Respiratory Therapy support as indicated  Outcome: Progressing     Problem: MOBILITY - ADULT  Goal: Maintain or return to baseline ADL function  Description: INTERVENTIONS:  -  Assess patient's ability to carry out ADLs; assess patient's baseline for ADL function and identify physical deficits which impact ability to perform ADLs (bathing, care of mouth/teeth, toileting, grooming, dressing, etc )  - Assess/evaluate cause of self-care deficits   - Assess range of motion  - Assess patient's mobility; develop plan if impaired  - Assess patient's need for assistive devices and provide as appropriate  - Encourage maximum independence but intervene and supervise when necessary  - Involve family in performance of ADLs  - Assess for home care needs following discharge   - Consider OT consult to assist with ADL evaluation and planning for discharge  - Provide patient education as appropriate  Outcome: Progressing  Goal: Maintains/Returns to pre admission functional level  Description: INTERVENTIONS:  - Perform BMAT or MOVE assessment daily    - Set and communicate daily mobility goal to care team and patient/family/caregiver     - Collaborate with rehabilitation services on mobility goals if consulted  - Out of bed for toileting  - Record patient progress and toleration of activity level   Outcome: Progressing

## 2022-04-28 NOTE — PROGRESS NOTES
RN CM called to speak with patient  Pt is experiencing SOB  Pt saw pulmonology yesterday but has not picked up prednisone prescription  S/W patient  Unable to speak in full sentences  Explained to patient that he needs to go to ED for evaluation  Pt advised to go to ED when speaking to clerical staff, however patient declined  Clerical staff (with RN CM present ) called patients daughter  Daughter advised of above  Daughter states he is anxious because he cannot drive  RN CM spoke with patient  Advised that clerical staff will call 911  [place] 911 called by Sondra Garner  Pt advised that 911 was called  Explained why it was necessary for him to be evaluated    Pt agreed to go to ED for evaluation

## 2022-04-28 NOTE — ASSESSMENT & PLAN NOTE
BP on arrival 223/98, no signs of end-organ damage at this time  Patient states that he is unable to take his medications today    · Continue Norvasc 10 mg PO daily  · Home midodrine held in the setting of uncontrolled BP  · Continue to monitor vital signs  · Will optimize medications as needed

## 2022-04-28 NOTE — ASSESSMENT & PLAN NOTE
Patient with a history of AAT deficiency and centrolobular emphysema presenting with SOB of 2-3 days  On home O2 2L and reports that he has had to increase O2 to 5L  125mg solumedrol given in ED   Trop neg    CTA: No evidence of acute PE  Pulmonary emphysema and parenchymal scarring without evidence of acute infiltrate  Trace bilateral effusions   CXR:  No acute cardiopulmonary disease    · Solu-Medrol discontinued yesterday  · Continuing home DuoNebs 3 mL 4 X daily, fluticasone, budesonide 0 5 mg BID, albuterol Q4 hrs PRN  · Continuous pulse oximetry  · Spirometry  · O2 supplementation, tapering as needed  · Pulmonology consulted, appreciate recommendations  · Patient receives weekly Zemaira injections, will order when indicated if patient remains in hospital  · Patient is not improved, will consult Cardiology  Patient going for cardiac catheterization today

## 2022-04-28 NOTE — Clinical Note
Aborting brachial approach  Unable to navigate swan catheter through vasculature  Prepping for femoral venous approach

## 2022-04-28 NOTE — ASSESSMENT & PLAN NOTE
Patient seen by pulmonology outpatient yesterday, has been having respiratory distress for several days  Was given Solu-Medrol yesterday with a 5 day course of steroid taper  Presented to the ED with ongoing progressively worsening shortness of breath and accessory muscle use  He has history of emphysema, alpha-1 antitrypsin deficiency      · See under COPD exacerbation

## 2022-04-28 NOTE — H&P
H&P Exam - Devi Flynn 65 y o  male MRN: [id number]    Unit/Bed#: [address] [place] [address] Encounter: [id number]    Patient admitted under the services of Dr Homero Carey, expected length of stay over 2 midnights  Assessment/Plan:    * COPD (chronic obstructive pulmonary disease) ([facility] )  Assessment & Plan  Patient with a history of AAT deficiency and centrolobular emphysema presenting with SOB of 2-3 days  On home O2 2L and reports that he has had to increase O2 to 5L  125mg solumedrol given in ED   Trop neg    CTA: No evidence of acute PE  Pulmonary emphysema and parenchymal scarring without evidence of acute infiltrate  Trace bilateral effusions   CXR:  No acute cardiopulmonary disease    · Received Solu-Medrol 125 mg dose in the ED  · Continuing home DuoNebs 3 mL 4 X daily, fluticasone, budesonide 0 5 mg BID, albuterol Q4 hrs PRN  · Continuous pulse oximetry  · O2 supplementation, tapering as needed  · Pulmonology consulted, appreciate recommendations  · Patient receives weekly Zemaira injections, will order when indicated if patient remains in hospital      Centrilobular emphysema [facility])  Assessment & Plan  Patient seen by pulmonology outpatient yesterday, has been having respiratory distress for several days  Was given Solu-Medrol yesterday with a 5 day course of steroid taper  Presented to the ED with ongoing progressively worsening shortness of breath and accessory muscle use  He has history of emphysema, alpha-1 antitrypsin deficiency  · See under COPD exacerbation    Alpha-1-antitrypsin deficiency ([facility] )  Assessment & Plan  · On Zemaira 1x week  · Will order when indicated if patient remains in hospital    Hypertensive urgency  Assessment & Plan  BP on arrival 223/98, no signs of end-organ damage at this time  Patient states that he is unable to take his medications today    · Continue Norvasc 10 mg PO daily  · Home midodrine held in the setting of uncontrolled BP  · Continue to monitor vital signs  · Will optimize medications as needed    Parkinson's disease [facility])  Assessment & Plan  Followed and managed by [facility]  · Continue carbidopa levodopa  mg p o  TID    Anxiousness  Assessment & Plan  Stable,  · Continue BuSpar 10 mg PO TID  · Continue lorazepam 1 mg PO BID PRN    Depression, recurrent (HCC)  Assessment & Plan  Stable  · Continue mirtazapine 15 mg PO HS    Orthostatic hypotension with spine hypertension  Assessment & Plan  BP on arrival 223/98  · Continue hypertensive medications  · Home midodrine held in the setting of hypertensive emergency    Peripheral neuropathy  Assessment & Plan  Stable  · Continue gabapentin    Chronic diarrhea   Assessment & Plan  Stable  · Continue cholestyramine    Ambulatory dysfunction  Assessment & Plan  Chronic  · Fall precautions  · PT/OT    Insomnia  Assessment & Plan  Stable  · Continue home trazodone and mirtazapine     BPH (benign prostatic hyperplasia)  Assessment & Plan  Stable  · Continue Flomax and finasteride    Essential hypertension  Assessment & Plan  BP on admission  · Continue Norvasc 10 mg POD  · Home midodrine held in setting of hypertensive urgency, will restart where appropriate    Gastroesophageal reflux disease  Assessment & Plan  Stable  · Continue home Protonix 40 mg b i d  History of Present Illness   65-year-old male with history of alpha-1 antitrypsin deficiency, centrilobular emphysema with 2L home O2, hypertension presenting to the ED in respiratory distress  He endorsed SOB since 1 week prior to admission  SOB has been progressively worsening, he has tried home medications without improvement  Shortness of breath occurs at rest and worsens with exertion  Denies recent illness, fever, chills, cough  Has been compliant with medications  Patient also endorsed diarrhea but this is baseline for him      ED:  Albuterol 10 mg neb, ipratropium 1 mg neb, ipratropium 1 mg nebs, hydralazine 10 mg IV,      Review of Systems Constitutional: Negative for chills and fever  HENT: Negative for congestion, ear pain, rhinorrhea and sore throat  Eyes: Negative for pain and visual disturbance  Respiratory: Positive for chest tightness and shortness of breath  Negative for cough  Cardiovascular: Negative for chest pain and palpitations  Gastrointestinal: Positive for diarrhea  Negative for abdominal pain, nausea and vomiting  Genitourinary: Negative for dysuria and hematuria  Musculoskeletal: Negative  Skin: Negative for rash  Neurological: Negative for syncope and headaches  Psychiatric/Behavioral: Negative  Historical Information   Past Medical History:   Diagnosis Date    Anesthesia complication     Difficult to wake up    Arthritis     BPH (benign prostatic hyperplasia)     urinary frequency    Cancer (HCC)     basal cell neck, face    [facility] ([facility])     COPD exacerbation ([facility] ) 12/29/2019    Full dentures     Hiatal hernia     History of methicillin resistant staphylococcus aureus (MRSA)     10/11/2018 MRSA (nares) positive    Hypertension     Irritable bowel syndrome     Kidney stone     at least 7 episodes    Liver disease     Alpha 1- enzyme deficiency - diagnosed 2002  has been on weekly replacement therapy since then    Pulmonary emphysema ([facility] )     1/25/15  FEV1 - 2 45 liters or 59% of predicted    RSV infection 12/2017    Wears glasses     for driving only     Past Surgical History:   Procedure Laterality Date    BACK SURGERY  2008    discectomy    COLONOSCOPY      COLONOSCOPY N/A 3/10/2017    Procedure: Dontrell Helm;  Surgeon: Reyna Salinas MD;  Location: [facility] LAB; Service:    Juanito Zimmerman      removal of kidney stones    ESOPHAGOGASTRODUODENOSCOPY N/A 3/10/2017    Procedure: ESOPHAGOGASTRODUODENOSCOPY (EGD); Surgeon: Reyna Salinas MD;  Location: [facility] GI LAB;   Service:     LITHOTRIPSY      TX ESOPHAGOGASTRODUODENOSCOPY TRANSORAL DIAGNOSTIC N/A 2018    Procedure: ESOPHAGOGASTRODUODENOSCOPY (EGD); Surgeon: Dax Torres MD;  Location: [facility] GI LAB; Service: Gastroenterology    TONSILLECTOMY      VEIN LIGATION AND STRIPPING Bilateral          Social History   Social History     Substance and Sexual Activity   Alcohol Use Not Currently    Comment: stopped in      Social History     Substance and Sexual Activity   Drug Use Not Currently     Social History     Tobacco Use   Smoking Status Former Smoker    Packs/day: 1 00    Years: 60 00    Pack years: 60 00    Quit date: 2017    Years since quittin 6   Smokeless Tobacco Never Used   Tobacco Comment    quit in 2017     E-Cigarette Use: Never User     E-Cigarette/Vaping Substances    Nicotine No     THC No     CBD No     Flavoring No     Other No     Unknown No        Family History:   Family History   Problem Relation Age of Onset    Emphysema Mother         never smoked    Emphysema Father     Cancer Brother         colon    Colon cancer Brother     Ulcerative colitis Family     Liver disease Family        Meds/Allergies   PTA meds:   Prior to Admission Medications   Prescriptions Last Dose Informant Patient Reported? Taking?    Diclofenac Sodium (VOLTAREN) 1 %  Self No No   Sig: Apply 2 g topically 4 (four) times a day for 7 days Apply to R knee   LORazepam (ATIVAN) 1 mg tablet 2022 at Unknown time Self Yes Yes   OXYGEN-HELIUM IN  Self Yes No   Sig: Inhale 2L at rest- 3L with activity   Ventolin  (90 Base) MCG/ACT inhaler 2022 at Unknown time Self No Yes   Sig: Inhale 2 puffs every 4 (four) hours as needed for wheezing   ZEMAIRA infusion Past Week at Unknown time Self Yes Yes   Sig: once a week    amLODIPine (NORVASC) 10 mg tablet 2022 at Unknown time Self No Yes   Sig: TAKE ONE TABLET BY MOUTH DAILY    budesonide (PULMICORT) 0 5 mg/2 mL nebulizer solution  Self No No   Sig: Take 2 mL (0 5 mg total) by nebulization 2 (two) times a day   busPIRone (BUSPAR) 10 mg tablet 2022 at Unknown time Self No Yes   Sig: TAKE ONE TABLET BY MOUTH THREE TIMES DAILY    carbamide peroxide (DEBROX) 6 5 % otic solution Past Month at Unknown time Self No Yes   Sig: Administer 5 drops into both ears 2 (two) times a day as needed for ear pain   carbidopa-levodopa (SINEMET)  mg per tablet Not Taking at Unknown time  No No   Sig: TAKE HALF TABLET BY MOUTH 30 minutes prior to breakfast, lunch, and dinner for 1 week  then increase to 1 tablet prior to each meal   Patient not taking: Reported on 2022    cholestyramine (QUESTRAN) 4 g packet 2022 at Unknown time Self Yes Yes   Sig: Take 1 packet by mouth daily   doxylamine (Unisom SleepTabs) 25 MG tablet 2022 at Unknown time Self Yes Yes   Sig: Take 25 mg by mouth daily at bedtime as needed for sleep   finasteride (PROSCAR) 5 mg tablet 2022 at Unknown time Self No Yes   Sig: TAKE ONE TABLET BY MOUTH IN THE MORNING    fluticasone (FLONASE) 50 mcg/act nasal spray 2022 at Unknown time Self No Yes   Si sprays into each nostril daily   gabapentin (NEURONTIN) 300 mg capsule 2022 at Unknown time  No Yes   Sig: TAKE ONE CAPSULE BY MOUTH THREE TIMES DAILY   ipratropium-albuterol (DUO-NEB) 0 5-2 5 mg/3 mL nebulizer solution 2022 at Unknown time Self No Yes   Sig: Take 3 mL by nebulization 4 (four) times a day   midodrine (PROAMATINE) 10 MG tablet Unknown at Unknown time Self No No   Sig: Take 1 tab three times daily   Please hold medication if blood pressure is above 310 systolic    mirtazapine (REMERON) 15 mg tablet 2022 at Unknown time Self No Yes   Sig: Take 1 tablet (15 mg total) by mouth daily at bedtime   pantoprazole (PROTONIX) 40 mg tablet 2022 at Unknown time Self No Yes   Sig: TAKE ONE TABLET BY MOUTH TWICE DAILY   predniSONE 20 mg tablet 2022 at Unknown time  No Yes   Sig: Three tablets daily for 3 days, 2 tablets daily for 3 days, 1 tablet daily for 10 days   tamsulosin (FLOMAX) 0 4 mg 4/27/2022 at Unknown time Self No Yes   Sig: TAKE ONE CAPSULE BY MOUTH DAILY      Facility-Administered Medications: None     Allergies   Allergen Reactions    Chantix [Varenicline]      Caused prostate infection       Objective   First Vitals:   Blood Pressure: (!) 223/98 (04/28/22 1102)  Pulse: 77 (04/28/22 1102)  Temperature: (!) 96 °F (35 6 °C) (04/28/22 1102)  Temp Source: Tympanic (04/28/22 1102)  Respirations: 22 (04/28/22 1102)  Height: 6' 5" (195 6 cm) (04/28/22 1453)  Weight - Scale: 80 5 kg (177 lb 7 5 oz) (04/28/22 1453)  SpO2: 98 % (04/28/22 1102)    Current Vitals:   Blood Pressure: (!) 180/96 (04/28/22 1453)  Pulse: 74 (04/28/22 1538)  Temperature: 97 6 °F (36 4 °C) (04/28/22 1453)  Temp Source: Tympanic (04/28/22 1102)  Respirations: 21 (04/28/22 1538)  Height: 6' 5" (195 6 cm) (04/28/22 1453)  Weight - Scale: 80 5 kg (177 lb 7 5 oz) (04/28/22 1453)  SpO2: 95 % (04/28/22 1538)    No intake or output data in the 24 hours ending 04/28/22 1741    Invasive Devices  Report    Peripheral Intravenous Line            Peripheral IV 04/28/22 Right Antecubital <1 day                Physical Exam  Vitals reviewed  Constitutional:       Appearance: Normal appearance  HENT:      Head: Normocephalic and atraumatic  Nose: Nose normal       Mouth/Throat:      Mouth: Mucous membranes are moist    Cardiovascular:      Rate and Rhythm: Normal rate and regular rhythm  Heart sounds: Heart sounds are distant  Pulmonary:      Effort: Accessory muscle usage and respiratory distress present  Breath sounds: Decreased air movement present  No wheezing, rhonchi or rales  Abdominal:      General: Bowel sounds are normal       Palpations: Abdomen is soft  Tenderness: There is no abdominal tenderness  There is no guarding  Musculoskeletal:         General: No swelling  Right lower leg: No edema  Left lower leg: No edema     Skin:     Capillary Refill: Capillary refill takes 2 to 3 seconds  Neurological:      General: No focal deficit present  Mental Status: He is alert     Psychiatric:         Mood and Affect: Mood normal          Lab Results:   Results for orders placed or performed during the hospital encounter of 04/28/22   COVID/FLU/RSV - 2 hour TAT    Specimen: Nose; Nares   Result Value Ref Range    SARS-CoV-2 Negative Negative    INFLUENZA A PCR Negative Negative    INFLUENZA B PCR Negative Negative    RSV PCR Negative Negative   CBC and differential   Result Value Ref Range    WBC 6 32 4 31 - 10 16 Thousand/uL    RBC 4 68 3 88 - 5 62 Million/uL    Hemoglobin 14 7 12 0 - 17 0 g/dL    Hematocrit 44 1 36 5 - 49 3 %    MCV 94 82 - 98 fL    MCH 31 4 26 8 - 34 3 pg    MCHC 33 3 31 4 - 37 4 g/dL    RDW 13 9 11 6 - 15 1 %    MPV 9 0 8 9 - 12 7 fL    Platelets 429 (L) 601 - 390 Thousands/uL    nRBC 0 /100 WBCs    Neutrophils Relative 46 43 - 75 %    Immat GRANS % 0 0 - 2 %    Lymphocytes Relative 44 14 - 44 %    Monocytes Relative 6 4 - 12 %    Eosinophils Relative 3 0 - 6 %    Basophils Relative 1 0 - 1 %    Neutrophils Absolute 2 89 1 85 - 7 62 Thousands/µL    Immature Grans Absolute 0 02 0 00 - 0 20 Thousand/uL    Lymphocytes Absolute 2 78 0 60 - 4 47 Thousands/µL    Monocytes Absolute 0 40 0 17 - 1 22 Thousand/µL    Eosinophils Absolute 0 19 0 00 - 0 61 Thousand/µL    Basophils Absolute 0 04 0 00 - 0 10 Thousands/µL   Protime-INR   Result Value Ref Range    Protime 14 0 11 6 - 14 5 seconds    INR 1 10 0 84 - 1 19   APTT   Result Value Ref Range    PTT 29 23 - 37 seconds   Comprehensive metabolic panel   Result Value Ref Range    Sodium 141 136 - 145 mmol/L    Potassium 4 2 3 5 - 5 3 mmol/L    Chloride 103 100 - 108 mmol/L    CO2 30 21 - 32 mmol/L    ANION GAP 8 4 - 13 mmol/L    BUN 21 5 - 25 mg/dL    Creatinine 1 23 0 60 - 1 30 mg/dL    Glucose 115 65 - 140 mg/dL    Calcium 9 0 8 3 - 10 1 mg/dL    AST 25 5 - 45 U/L    ALT 45 12 - 78 U/L Alkaline Phosphatase 76 46 - 116 U/L    Total Protein 6 7 6 4 - 8 2 g/dL    Albumin 3 9 3 5 - 5 0 g/dL    Total Bilirubin 1 21 (H) 0 20 - 1 00 mg/dL    eGFR 55 ml/min/1 73sq m   Magnesium   Result Value Ref Range    Magnesium 2 0 1 6 - 2 6 mg/dL   HS Troponin 0hr (reflex protocol)   Result Value Ref Range    hs TnI 0hr 28 "Refer to ACS Flowchart"- see link ng/L   NT-BNP PRO   Result Value Ref Range    NT-proBNP 429 <450 pg/mL   Blood gas, arterial   Result Value Ref Range    pH, Arterial 7 430 7 350 - 7 450    PH ART TC 7 451 (H) 7 350 - 7 450    pCO2, Arterial 41 4 36 0 - 44 0 mm Hg    PCO2 (TC) Arterial 38 9 36 0 - 44 0 mm Hg    pO2, Arterial 171 2 (H) 75 0 - 129 0 mm Hg    PO2 (TC) Arterial 163 6 (H) 75 0 - 129 0 mm Hg    HCO3, Arterial 26 9 22 0 - 28 0 mmol/L    Base Excess, Arterial 2 3 mmol/L    O2 Content, Arterial 20 4 16 0 - 23 0 mL/dL    O2 HGB,Arterial  98 7 (H) 94 0 - 97 0 %    SOURCE Radial, Right     FLORENCE TEST Yes     Temperature 96 0 Degrees Fehrenheit    Non Vent type BIPAP BIPAP     IPAP 12     EPAP 8     BIPAP fio2 50 %   HS Troponin I 2hr   Result Value Ref Range    hs TnI 2hr 24 "Refer to ACS Flowchart"- see link ng/L    Delta 2hr hsTnI -4 <20 ng/L   HS Troponin I 4hr   Result Value Ref Range    hs TnI 4hr 20 "Refer to ACS Flowchart"- see link ng/L    Delta 4hr hsTnI -8 <20 ng/L     *Note: Due to a large number of results and/or encounters for the requested time period, some results have not been displayed  A complete set of results can be found in Results Review  Imaging:   CTA ED chest PE Study   Final Result      1  No evidence of acute pulmonary embolism  2  Pulmonary emphysema and parenchymal scarring without evidence of acute infiltrate   3  Trace bilateral effusions                     Workstation performed: [id number]         XR chest 1 view portable   Final Result      No acute cardiopulmonary disease                    Workstation performed: [id number]             EKG, Pathology, and Other Studies:  I have reviewed all pertinent reports    Code Status: Level 1 - Full Code  Advance Directive and Living Will:      Power of :    POLST:      Counseling / Coordination of Care: Total floor / unit time spent today 30 minutes

## 2022-04-28 NOTE — ED PROVIDER NOTES
History  Chief Complaint   Patient presents with    Shortness of Breath     pt c/o sob since last Thursday , pt arrived via [facility]     C/o shortness of breath for the past few weeks  Seen in the ED and discharged for the same last week  History of COPD  History provided by:  Patient   used: No        Prior to Admission Medications   Prescriptions Last Dose Informant Patient Reported? Taking? Diclofenac Sodium (VOLTAREN) 1 %  Self No No   Sig: Apply 2 g topically 4 (four) times a day for 7 days Apply to R knee   LORazepam (ATIVAN) 1 mg tablet 4/27/2022 at Unknown time Self Yes Yes   OXYGEN-HELIUM IN  Self Yes No   Sig: Inhale 2L at rest- 3L with activity   Ventolin  (90 Base) MCG/ACT inhaler 4/27/2022 at Unknown time Self No Yes   Sig: Inhale 2 puffs every 4 (four) hours as needed for wheezing   ZEMAIRA infusion Past Week at Unknown time Self Yes Yes   Sig: once a week    amLODIPine (NORVASC) 10 mg tablet 4/27/2022 at Unknown time Self No Yes   Sig: TAKE ONE TABLET BY MOUTH DAILY    budesonide (PULMICORT) 0 5 mg/2 mL nebulizer solution  Self No No   Sig: Take 2 mL (0 5 mg total) by nebulization 2 (two) times a day   busPIRone (BUSPAR) 10 mg tablet 4/27/2022 at Unknown time Self No Yes   Sig: TAKE ONE TABLET BY MOUTH THREE TIMES DAILY    carbamide peroxide (DEBROX) 6 5 % otic solution Past Month at Unknown time Self No Yes   Sig: Administer 5 drops into both ears 2 (two) times a day as needed for ear pain   carbidopa-levodopa (SINEMET)  mg per tablet Not Taking at Unknown time  No No   Sig: TAKE HALF TABLET BY MOUTH 30 minutes prior to breakfast, lunch, and dinner for 1 week   then increase to 1 tablet prior to each meal   Patient not taking: Reported on 4/28/2022    cholestyramine (QUESTRAN) 4 g packet 4/27/2022 at Unknown time Self Yes Yes   Sig: Take 1 packet by mouth daily   doxylamine (Unisom SleepTabs) 25 MG tablet 4/27/2022 at Unknown time Self Yes Yes   Sig: Take 25 mg by mouth daily at bedtime as needed for sleep   finasteride (PROSCAR) 5 mg tablet 2022 at Unknown time Self No Yes   Sig: TAKE ONE TABLET BY MOUTH IN THE MORNING    fluticasone (FLONASE) 50 mcg/act nasal spray 2022 at Unknown time Self No Yes   Si sprays into each nostril daily   gabapentin (NEURONTIN) 300 mg capsule 2022 at Unknown time  No Yes   Sig: TAKE ONE CAPSULE BY MOUTH THREE TIMES DAILY   ipratropium-albuterol (DUO-NEB) 0 5-2 5 mg/3 mL nebulizer solution 2022 at Unknown time Self No Yes   Sig: Take 3 mL by nebulization 4 (four) times a day   midodrine (PROAMATINE) 10 MG tablet Unknown at Unknown time Self No No   Sig: Take 1 tab three times daily  Please hold medication if blood pressure is above 384 systolic    mirtazapine (REMERON) 15 mg tablet 2022 at Unknown time Self No Yes   Sig: Take 1 tablet (15 mg total) by mouth daily at bedtime   pantoprazole (PROTONIX) 40 mg tablet 2022 at Unknown time Self No Yes   Sig: TAKE ONE TABLET BY MOUTH TWICE DAILY   predniSONE 20 mg tablet 2022 at Unknown time  No Yes   Sig: Three tablets daily for 3 days, 2 tablets daily for 3 days, 1 tablet daily for 10 days   tamsulosin (FLOMAX) 0 4 mg 2022 at Unknown time Self No Yes   Sig: TAKE ONE CAPSULE BY MOUTH DAILY      Facility-Administered Medications: None       Past Medical History:   Diagnosis Date    Anesthesia complication     Difficult to wake up    Arthritis     BPH (benign prostatic hyperplasia)     urinary frequency    Cancer (HCC)     basal cell neck, face    COPD (chronic obstructive pulmonary disease) (HCC)     COPD exacerbation ([facility] ) 2019    Full dentures     Hiatal hernia     History of methicillin resistant staphylococcus aureus (MRSA)     10/11/2018 MRSA (nares) positive    Hypertension     Irritable bowel syndrome     Kidney stone     at least 7 episodes    Liver disease     Alpha 1- enzyme deficiency - diagnosed    has been on weekly replacement therapy since then    Pulmonary emphysema ([facility] [address] )     1/25/15  FEV1 - 2 45 liters or 59% of predicted    RSV infection 2017    Wears glasses     for driving only       Past Surgical History:   Procedure Laterality Date    BACK SURGERY      discectomy    COLONOSCOPY      COLONOSCOPY N/A 3/10/2017    Procedure: Leonetta Dry;  Surgeon: Wilfred Calvert MD;  Location: [facility] GI LAB; Service:    Devere Kettle      removal of kidney stones    ESOPHAGOGASTRODUODENOSCOPY N/A 3/10/2017    Procedure: ESOPHAGOGASTRODUODENOSCOPY (EGD); Surgeon: Wilfred Calvert MD;  Location: [facility] GI LAB; Service:     LITHOTRIPSY      [place] ESOPHAGOGASTRODUODENOSCOPY TRANSORAL DIAGNOSTIC N/A 2018    Procedure: ESOPHAGOGASTRODUODENOSCOPY (EGD); Surgeon: Wilfred Calvert MD;  Location: [facility] GI LAB; Service: Gastroenterology    TONSILLECTOMY      VEIN LIGATION AND STRIPPING Bilateral     18's       Family History   Problem Relation Age of Onset    Emphysema Mother         never smoked    Emphysema Father     Cancer Brother         colon    Colon cancer Brother     Ulcerative colitis Family     Liver disease Family      I have reviewed and agree with the history as documented  E-Cigarette/Vaping    E-Cigarette Use Never User      E-Cigarette/Vaping Substances    Nicotine No     THC No     CBD No     Flavoring No     Other No     Unknown No      Social History     Tobacco Use    Smoking status: Former Smoker     Packs/day: 1 00     Years: 60 00     Pack years: 60 00     Quit date: 2017     Years since quittin 6    Smokeless tobacco: Never Used    Tobacco comment: quit in 2017   Vaping Use    Vaping Use: Never used   Substance Use Topics    Alcohol use: Not Currently     Comment: stopped in     Drug use: Not Currently       Review of Systems   Constitutional: Negative  HENT: Negative  Eyes: Negative  Respiratory: Positive for shortness of breath and wheezing  Cardiovascular: Negative  Gastrointestinal: Negative  Endocrine: Negative  Genitourinary: Negative  Musculoskeletal: Negative  Skin: Negative  Allergic/Immunologic: Negative  Neurological: Negative  Hematological: Negative  Psychiatric/Behavioral: Negative  All other systems reviewed and are negative  Physical Exam  Physical Exam  Constitutional:       Appearance: Normal appearance  HENT:      Head: Normocephalic and atraumatic  Nose: Nose normal       Mouth/Throat:      Mouth: Mucous membranes are moist    Eyes:      Extraocular Movements: Extraocular movements intact  Pupils: Pupils are equal, round, and reactive to light  Cardiovascular:      Rate and Rhythm: Normal rate and regular rhythm  Pulmonary:      Effort: Tachypnea, accessory muscle usage and respiratory distress present  Breath sounds: Wheezing and rhonchi present  No rales  Abdominal:      General: Abdomen is flat  Bowel sounds are normal       Palpations: Abdomen is soft  Musculoskeletal:         General: Normal range of motion  Cervical back: Normal range of motion and neck supple  Skin:     General: Skin is warm  Capillary Refill: Capillary refill takes less than 2 seconds  Neurological:      General: No focal deficit present  Mental Status: He is alert and oriented to person, place, and time  Mental status is at baseline  Psychiatric:         Mood and Affect: Mood normal          Thought Content:  Thought content normal          Vital Signs  ED Triage Vitals   Temperature Pulse Respirations Blood Pressure SpO2   04/28/22 1102 04/28/22 1102 04/28/22 1102 04/28/22 1102 04/28/22 1102   (!) 96 °F (35 6 °C) 77 22 (!) 223/98 98 %      Temp Source Heart Rate Source Patient Position - Orthostatic VS BP Location FiO2 (%)   04/28/22 1102 04/28/22 1102 04/28/22 1239 04/28/22 1239 --   Tympanic Monitor Lying Left arm       Pain Score       04/28/22 1501       No Pain Vitals:    04/28/22 1315 04/28/22 1415 04/28/22 1453 04/28/22 1538   BP: (!) 192/104 (!) 182/99 (!) 180/96    Pulse: 70  78 74   Patient Position - Orthostatic VS: Sitting Lying           Visual Acuity      ED Medications  Medications   busPIRone (BUSPAR) tablet 10 mg (has no administration in time range)   ipratropium-albuterol (DUO-NEB) 0 5-2 5 mg/3 mL inhalation solution 3 mL (3 mL Nebulization Given 4/28/22 1537)   albuterol (PROVENTIL HFA,VENTOLIN HFA) inhaler 2 puff (has no administration in time range)   budesonide (PULMICORT) inhalation solution 0 5 mg (has no administration in time range)   gabapentin (NEURONTIN) capsule 300 mg (has no administration in time range)   mirtazapine (REMERON) tablet 15 mg (has no administration in time range)   cholestyramine sugar free (QUESTRAN LIGHT) packet 4 g (has no administration in time range)   amLODIPine (NORVASC) tablet 10 mg (has no administration in time range)   finasteride (PROSCAR) tablet 5 mg (has no administration in time range)   tamsulosin (FLOMAX) capsule 0 4 mg (has no administration in time range)   fluticasone (FLONASE) 50 mcg/act nasal spray 2 spray (has no administration in time range)   pantoprazole (PROTONIX) EC tablet 40 mg (has no administration in time range)   midodrine (PROAMATINE) tablet 10 mg (10 mg Oral Not Given 4/28/22 1602)   acetaminophen (TYLENOL) tablet 650 mg (has no administration in time range)   enoxaparin (LOVENOX) subcutaneous injection 40 mg (has no administration in time range)   carbidopa-levodopa (SINEMET)  mg per tablet 1 tablet (has no administration in time range)   albuterol (FOR EMS ONLY) (2 5 mg/3 mL) 0 083 % inhalation solution 2 5 mg (0 mg Does not apply Given to EMS 4/28/22 1104)   methylPREDNISolone sodium succinate (FOR EMS ONLY) (Solu-MEDROL) 125 MG injection 125 mg (0 mg Does not apply Given to EMS 4/28/22 1103)   ipratropium-albuterol (FOR EMS ONLY) (DUO-NEB) 0 5-2 5 mg/3 mL inhalation solution 3 mL (0 mL Does not apply Given to EMS 4/28/22 1103)   albuterol CONTINUOUS nebulizing solution (canister) 10 mg (10 mg Nebulization Given 4/28/22 1135)   ipratropium (ATROVENT) 0 02 % inhalation solution 1 mg (1 mg Nebulization Given 4/28/22 1135)   hydrALAZINE (APRESOLINE) injection 10 mg (10 mg Intravenous Given 4/28/22 1337)   ipratropium-albuterol (DUO-NEB) 0 5-2 5 mg/3 mL inhalation solution 3 mL (3 mL Nebulization Given 4/28/22 1347)   ipratropium-albuterol (DUO-NEB) 0 5-2 5 mg/3 mL inhalation solution 3 mL (3 mL Nebulization Given 4/28/22 1347)   iohexol (OMNIPAQUE) 350 MG/ML injection (SINGLE-DOSE) 85 mL (85 mL Intravenous Given 4/28/22 1421)       Diagnostic Studies  Results Reviewed     Procedure Component Value Units Date/Time    HS Troponin I 2hr [[id number]]  (Normal) Collected: 04/28/22 1340    Lab Status: Final result Specimen: Blood from Arm, Right Updated: 04/28/22 1424     hs TnI 2hr 24 ng/L      Delta 2hr hsTnI -4 ng/L     COVID/FLU/RSV - 2 hour TAT [[id number]]  (Normal) Collected: 04/28/22 1122    Lab Status: Final result Specimen: Nares from Nose Updated: 04/28/22 1208     SARS-CoV-2 Negative     INFLUENZA A PCR Negative     INFLUENZA B PCR Negative     RSV PCR Negative    Narrative:      FOR PEDIATRIC PATIENTS - copy/paste COVID Guidelines URL to browser: [email or web address]    SARS-CoV-2 assay is a Nucleic Acid Amplification assay intended for the  qualitative detection of nucleic acid from SARS-CoV-2 in nasopharyngeal  swabs  Results are for the presumptive identification of SARS-CoV-2 RNA  Positive results are indicative of infection with SARS-CoV-2, the virus  causing COVID-19, but do not rule out bacterial infection or co-infection  with other viruses  Laboratories within the [place] and its  territories are required to report all positive results to the appropriate  public health authorities   Negative results do not preclude SARS-CoV-2  infection and should not be used as the sole basis for treatment or other  patient management decisions  Negative results must be combined with  clinical observations, patient history, and epidemiological information  This test has not been FDA cleared or approved  This test has been authorized by FDA under an Emergency Use Authorization  (EUA)  This test is only authorized for the duration of time the  declaration that circumstances exist justifying the authorization of the  emergency use of an in vitro diagnostic tests for detection of SARS-CoV-2  virus and/or diagnosis of COVID-19 infection under section 564(b)(1) of  the Act, 21 U  S C  191OGV-7(P)(4), unless the authorization is terminated  or revoked sooner  The test has been validated but independent review by FDA  and CLIA is pending  Test performed using [employer] GeneXpert: This RT-PCR assay targets N2,  a region unique to SARS-CoV-2  A conserved region in the E-gene was chosen  for pan-Sarbecovirus detection which includes SARS-CoV-2      HS Troponin I 4hr [[id number]]     Lab Status: No result Specimen: Blood     Blood gas, arterial [[id number]]  (Abnormal) Collected: 04/28/22 1146    Lab Status: Final result Specimen: Blood, Arterial from Radial, Right Updated: 04/28/22 1200     pH, Arterial 7 430     PH ART TC 7 451     pCO2, Arterial 41 4 mm Hg      PCO2 (TC) Arterial 38 9 mm Hg      pO2, Arterial 171 2 mm Hg      PO2 (TC) Arterial 163 6 mm Hg      HCO3, Arterial 26 9 mmol/L      Base Excess, Arterial 2 3 mmol/L      O2 Content, Arterial 20 4 mL/dL      O2 HGB,Arterial  98 7 %      SOURCE Radial, Right     [place] TEST Yes     Temperature 96 0 Degrees Fehrenheit      Non Vent type BIPAP BIPAP     IPAP 12     EPAP 8     BIPAP fio2 50 %     Magnesium [[id number]]  (Normal) Collected: 04/28/22 1122    Lab Status: Final result Specimen: Blood from Arm, Right Updated: 04/28/22 1157     Magnesium 2 0 mg/dL     NT-BNP PRO [[id number]]  (Normal) Collected: 04/28/22 1122    Lab Status: Final result Specimen: Blood from Arm, Right Updated: 04/28/22 1157     NT-proBNP 429 pg/mL     HS Troponin 0hr (reflex protocol) [[id number]]  (Normal) Collected: 04/28/22 1122    Lab Status: Final result Specimen: Blood from Arm, Right Updated: 04/28/22 1156     hs TnI 0hr 28 ng/L     Comprehensive metabolic panel [[id number]]  (Abnormal) Collected: 04/28/22 1122    Lab Status: Final result Specimen: Blood from Arm, Right Updated: 04/28/22 1151     Sodium 141 mmol/L      Potassium 4 2 mmol/L      Chloride 103 mmol/L      CO2 30 mmol/L      ANION GAP 8 mmol/L      BUN 21 mg/dL      Creatinine 1 23 mg/dL      Glucose 115 mg/dL      Calcium 9 0 mg/dL      AST 25 U/L      ALT 45 U/L      Alkaline Phosphatase 76 U/L      Total Protein 6 7 g/dL      Albumin 3 9 g/dL      Total Bilirubin 1 21 mg/dL      eGFR 55 ml/min/1 73sq m     Narrative:      [place] guidelines for Chronic Kidney Disease (CKD):     Stage 1 with normal or high GFR (GFR > 90 mL/min/1 73 square meters)    Stage 2 Mild CKD (GFR = 60-89 mL/min/1 73 square meters)    Stage 3A Moderate CKD (GFR = 45-59 mL/min/1 73 square meters)    Stage 3B Moderate CKD (GFR = 30-44 mL/min/1 73 square meters)    Stage 4 Severe CKD (GFR = 15-29 mL/min/1 73 square meters)    Stage 5 End Stage CKD (GFR <15 mL/min/1 73 square meters)  Note: GFR calculation is accurate only with a steady state creatinine    Protime-INR [[id number]]  (Normal) Collected: 04/28/22 1122    Lab Status: Final result Specimen: Blood from Arm, Right Updated: 04/28/22 1146     Protime 14 0 seconds      INR 1 10    APTT [[id number]]  (Normal) Collected: 04/28/22 1122    Lab Status: Final result Specimen: Blood from Arm, Right Updated: 04/28/22 1146     PTT 29 seconds     CBC and differential [[id number]]  (Abnormal) Collected: 04/28/22 1122    Lab Status: Final result Specimen: Blood from Arm, Right Updated: 04/28/22 1128     WBC 6 32 Thousand/uL      RBC 4 68 Million/uL      Hemoglobin 14 7 g/dL      Hematocrit 44 1 %      MCV 94 fL      MCH 31 4 pg      MCHC 33 3 g/dL      RDW 13 9 %      MPV 9 0 fL      Platelets 472 Thousands/uL      nRBC 0 /100 WBCs      Neutrophils Relative 46 %      Immat GRANS % 0 %      Lymphocytes Relative 44 %      Monocytes Relative 6 %      Eosinophils Relative 3 %      Basophils Relative 1 %      Neutrophils Absolute 2 89 Thousands/µL      Immature Grans Absolute 0 02 Thousand/uL      Lymphocytes Absolute 2 78 Thousands/µL      Monocytes Absolute 0 40 Thousand/µL      Eosinophils Absolute 0 19 Thousand/µL      Basophils Absolute 0 04 Thousands/µL                  CTA ED chest PE Study   Final Result by Theresa Ayala MD (04/28 1449)      1  No evidence of acute pulmonary embolism  2  Pulmonary emphysema and parenchymal scarring without evidence of acute infiltrate   3  Trace bilateral effusions                     Workstation performed: [id number]         XR chest 1 view portable   Final Result by Tia Zee MD (04/28 6831)      No acute cardiopulmonary disease                    Workstation performed: [id number]                    Procedures  ECG 12 Lead Documentation Only    Date/Time: 4/28/2022 11:16 AM  Performed by: Raudel Mckeon DO  Authorized by: Raudel Mckeon DO     ECG reviewed by me, the ED Provider: yes    Patient location:  ED  Previous ECG:     Previous ECG:  Unavailable    Comparison to cardiac monitor: Yes    Interpretation:     Interpretation: non-specific    Rate:     ECG rate assessment: normal    Rhythm:     Rhythm: sinus rhythm    Ectopy:     Ectopy: none    QRS:     QRS axis:  Normal  Conduction:     Conduction: normal    ST segments:     ST segments:  Non-specific  T waves:     T waves: non-specific      CriticalCare Time  Performed by: Raudel Mckeon DO  Authorized by: Raudel Mckeon DO     Critical care provider statement:     Critical care time (minutes):  45    Critical care was necessary to treat or prevent imminent or life-threatening deterioration of the following conditions:  Respiratory failure    Critical care was time spent personally by me on the following activities:  Blood draw for specimens, obtaining history from patient or surrogate, development of treatment plan with patient or surrogate, discussions with consultants, evaluation of patient's response to treatment, examination of patient, interpretation of cardiac output measurements, ordering and performing treatments and interventions, ordering and review of laboratory studies, ordering and review of radiographic studies, re-evaluation of patient's condition and review of old charts    I assumed direction of critical care for this patient from another provider in my specialty: no               ED Course                               SBIRT 22yo+      Most Recent Value   SBIRT (24 yo +)    In order to provide better care to our patients, we are screening all of our patients for alcohol and drug use  Would it be okay to ask you these screening questions?  No Filed at: 04/28/2022 1234                    [facility]  Number of Diagnoses or Management Options     Amount and/or Complexity of Data Reviewed  Clinical lab tests: ordered and reviewed  Tests in the radiology section of CPT®: reviewed and ordered  Tests in the medicine section of CPT®: ordered and reviewed    Patient Progress  Patient progress: stable      Disposition  Final diagnoses:   COPD exacerbation ([facility] )     Time reflects when diagnosis was documented in both MDM as applicable and the Disposition within this note     Time User Action Codes Description Comment    4/28/2022 11:48 AM Roseann ShowAiru Add [E88 01] [facility]-antitry[facility]     4/28/2022 11:48 AM Roseann ShowAiru Modify [E88 01] AAT (alpha-1-antitrypsin) deficiency ([facility] )     4/28/2022  1:07 PM Corin Alston Add [J44 1] COPD exacerbation ([facility] )     4/28/2022  3:56 PM Keny Arias 169 [J43 8] Other [facility]     4/28/2022  3:56 PM Trisha Rodriguez Add [J96 47] Chronic respiratory failure with hypoxia ([facility] )     4/28/2022  3:56 PM Trisha Rodriguez Add [J43 2] Centrilobular emphysema [facility])       ED Disposition     ED Disposition Condition Date/Time Comment    Admit Stable Thu Apr 28, 2022  1:07 PM          Follow-up Information    None         Current Discharge Medication List      CONTINUE these medications which have NOT CHANGED    Details   amLODIPine (NORVASC) 10 mg tablet TAKE ONE TABLET BY MOUTH DAILY   Qty: 30 tablet, Refills: 6    Associated Diagnoses: Essential hypertension      busPIRone (BUSPAR) 10 mg tablet TAKE ONE TABLET BY MOUTH THREE TIMES DAILY   Qty: 90 tablet, Refills: 3    Associated Diagnoses: Chronic respiratory failure with hypoxia (HCC)      carbamide peroxide (DEBROX) 6 5 % otic solution Administer 5 drops into both ears 2 (two) times a day as needed for ear pain  Qty: 15 mL, Refills: 0    Associated Diagnoses: Hearing loss of left ear due to cerumen impaction      cholestyramine (QUESTRAN) 4 g packet Take 1 packet by mouth daily      doxylamine (Unisom SleepTabs) 25 MG tablet Take 25 mg by mouth daily at bedtime as needed for sleep      finasteride (PROSCAR) 5 mg tablet TAKE ONE TABLET BY MOUTH IN THE MORNING   Qty: 90 tablet, Refills: 3    Associated Diagnoses: Benign prostatic hyperplasia with urinary obstruction      fluticasone (FLONASE) 50 mcg/act nasal spray 2 sprays into each nostril daily  Qty: 16 g, Refills: 5    Associated Diagnoses: Rhinitis, unspecified type      gabapentin (NEURONTIN) 300 mg capsule TAKE ONE CAPSULE BY MOUTH THREE TIMES DAILY  Qty: 90 capsule, Refills: 0    Associated Diagnoses: Neuropathy      ipratropium-albuterol (DUO-NEB) 0 5-2 5 mg/3 mL nebulizer solution Take 3 mL by nebulization 4 (four) times a day  Qty: 360 mL, Refills: 5    Associated Diagnoses: COPD exacerbation (HCC)      LORazepam (ATIVAN) 1 mg tablet       mirtazapine (REMERON) 15 mg tablet Take 1 tablet (15 mg total) by mouth daily at bedtime  Qty: 30 tablet, Refills: 5    Associated Diagnoses: Primary insomnia      pantoprazole (PROTONIX) 40 mg tablet TAKE ONE TABLET BY MOUTH TWICE DAILY  Qty: 180 tablet, Refills: 0    Associated Diagnoses: Gastroesophageal reflux disease without esophagitis      predniSONE 20 mg tablet Three tablets daily for 3 days, 2 tablets daily for 3 days, 1 tablet daily for 10 days  Qty: 20 tablet, Refills: 0    Associated Diagnoses: COPD exacerbation (HCC)      tamsulosin (FLOMAX) 0 4 mg TAKE ONE CAPSULE BY MOUTH DAILY  Qty: 90 capsule, Refills: 0    Associated Diagnoses: Benign prostatic hyperplasia with urinary obstruction      Ventolin  (90 Base) MCG/ACT inhaler Inhale 2 puffs every 4 (four) hours as needed for wheezing  Qty: 18 g, Refills: 5    Associated Diagnoses: Centrilobular emphysema ([address] )      ZEMAIRA infusion once a week       budesonide (PULMICORT) 0 5 mg/2 mL nebulizer solution Take 2 mL (0 5 mg total) by nebulization 2 (two) times a day  Qty: 360 mL, Refills: 11    Associated Diagnoses: Centrilobular emphysema (HCC)      carbidopa-levodopa (SINEMET)  mg per tablet TAKE HALF TABLET BY MOUTH 30 minutes prior to breakfast, lunch, and dinner for 1 week  then increase to 1 tablet prior to each meal  Qty: 90 tablet, Refills: 0    Associated Diagnoses: Parkinson's disease (HCC)      Diclofenac Sodium (VOLTAREN) 1 % Apply 2 g topically 4 (four) times a day for 7 days Apply to R knee  Qty: 56 g, Refills: 0    Associated Diagnoses: Palliative care patient; Arthritis      midodrine (PROAMATINE) 10 MG tablet Take 1 tab three times daily  Please hold medication if blood pressure is above 486 systolic  Qty: 90 tablet, Refills: 4    Associated Diagnoses: Orthostatic hypertension      OXYGEN-HELIUM IN Inhale 2L at rest- 3L with activity             No discharge procedures on file      PDMP Review       Value Time User    PDMP Reviewed  Yes 4/5/2022 9:56 AM Beth Klein MD          ED Provider  Electronically Signed by           Aquiles Roman DO  04/28/22 9128

## 2022-04-28 NOTE — ED NOTES
Pt tolerating the o2 via nc at 3 lpm o2 sat maintaining bet 96-97 percent at rest      Mauro Conteh RN  04/28/22 5871

## 2022-04-28 NOTE — ASSESSMENT & PLAN NOTE
BP on arrival 223/98  · Continue hypertensive medications  · Home midodrine held in the setting of hypertensive emergency

## 2022-04-28 NOTE — TELEPHONE ENCOUNTER
Patient called office to ask for a follow up appointment for HTN within the next week  Patient c/o HTN, SOB, and weakness  Pt states he is on 4L O2 and his pulse ox is currently 94% at rest    Pt appears to be unable to speak in full sentences  Pt unable to  prednisone prescribed by pulmonolgy yet  Pt refusing same day appointment here due to weakness and SOB (offered lyft ride)  I spoke with Jean Carlos Osorio RN CM who advised that patient to call 911 and be evaluated at ER  Patient was reluctant and was refusing due to past experiences of going and being sent home not feeling better  Patient requested an appointment for tomorrow (which I scheduled with Dr Banerjee Speaker) and then to be transferred to [facility] to cancel "shopping trip transportation"  [place] came to tell Mary that pt appearing to have SOB  Mary and I called patient's emergency contact/daughter Kenrick Henriquez and notified of concerns  Kenrick Henriquez said that she will call him but believes it to be anxiety r/t not being able to drive  Spoke to Dr Lavonne Templeton who advised pt should be seen in person but if patient is too weak to come to office then agrees he should be evaluated by EMS  Crystal Carter spoke with pt and pt still appearing to be SOB, difficulty speaking in complete sentences  Pt willing to have EMS called and go to the ER to be evaluated  Pt's concerns were that he didn't know how he would get home - advised by Mary that SLETS/lyft or a ride from his daughter can be arranged and that they won't do anything for him  Patient aware that in person examination is needed for shortness of breath  [facility] and provided patient's name, address and situation as well as direct call back number  Crystal Carter remained on the phone with pt while he waits for EMS

## 2022-04-28 NOTE — ASSESSMENT & PLAN NOTE
BP on admission  · Continue Norvasc 10 mg POD  · Home midodrine held in setting of hypertensive urgency, will restart where appropriate

## 2022-04-28 NOTE — ED NOTES
Patient with two SIRS and on bipap  Dr Jac Messina does not want a sepsis alert called at this time       Thomas Moise, RN  04/28/22 6705

## 2022-04-28 NOTE — Clinical Note
Right brachial and right femoral venous sheath sites stable  No bleeding or hematoma noted  Dry sterile dressings applied  Complex Repair And O-T Advancement Flap Text: The defect edges were debeveled with a #15 scalpel blade.  The primary defect was closed partially with a complex linear closure.  Given the location of the remaining defect, shape of the defect and the proximity to free margins an O-T advancement flap was deemed most appropriate for complete closure of the defect.  Using a sterile surgical marker, an appropriate advancement flap was drawn incorporating the defect and placing the expected incisions within the relaxed skin tension lines where possible.    The area thus outlined was incised deep to adipose tissue with a #15 scalpel blade.  The skin margins were undermined to an appropriate distance in all directions utilizing iris scissors.

## 2022-04-29 LAB
ANION GAP SERPL CALCULATED.3IONS-SCNC: 9 MMOL/L (ref 4–13)
BASOPHILS # BLD AUTO: 0.01 THOUSANDS/ΜL (ref 0–0.1)
BASOPHILS NFR BLD AUTO: 0 % (ref 0–1)
BUN SERPL-MCNC: 23 MG/DL (ref 5–25)
CALCIUM SERPL-MCNC: 9.3 MG/DL (ref 8.3–10.1)
CHLORIDE SERPL-SCNC: 103 MMOL/L (ref 100–108)
CO2 SERPL-SCNC: 27 MMOL/L (ref 21–32)
CREAT SERPL-MCNC: 1.07 MG/DL (ref 0.6–1.3)
EOSINOPHIL # BLD AUTO: 0 THOUSAND/ΜL (ref 0–0.61)
EOSINOPHIL NFR BLD AUTO: 0 % (ref 0–6)
ERYTHROCYTE [DISTWIDTH] IN BLOOD BY AUTOMATED COUNT: 13.6 % (ref 11.6–15.1)
GFR SERPL CREATININE-BSD FRML MDRD: 66 ML/MIN/1.73SQ M
GLUCOSE SERPL-MCNC: 127 MG/DL (ref 65–140)
HCT VFR BLD AUTO: 41.6 % (ref 36.5–49.3)
HGB BLD-MCNC: 14.3 G/DL (ref 12–17)
IMM GRANULOCYTES # BLD AUTO: 0.06 THOUSAND/UL (ref 0–0.2)
IMM GRANULOCYTES NFR BLD AUTO: 1 % (ref 0–2)
LYMPHOCYTES # BLD AUTO: 1.68 THOUSANDS/ΜL (ref 0.6–4.47)
LYMPHOCYTES NFR BLD AUTO: 15 % (ref 14–44)
MCH RBC QN AUTO: 32.3 PG (ref 26.8–34.3)
MCHC RBC AUTO-ENTMCNC: 34.4 G/DL (ref 31.4–37.4)
MCV RBC AUTO: 94 FL (ref 82–98)
MONOCYTES # BLD AUTO: 0.6 THOUSAND/ΜL (ref 0.17–1.22)
MONOCYTES NFR BLD AUTO: 5 % (ref 4–12)
NEUTROPHILS # BLD AUTO: 9.22 THOUSANDS/ΜL (ref 1.85–7.62)
NEUTS SEG NFR BLD AUTO: 79 % (ref 43–75)
NRBC BLD AUTO-RTO: 0 /100 WBCS
PLATELET # BLD AUTO: 132 THOUSANDS/UL (ref 149–390)
PMV BLD AUTO: 8.7 FL (ref 8.9–12.7)
POTASSIUM SERPL-SCNC: 4.4 MMOL/L (ref 3.5–5.3)
RBC # BLD AUTO: 4.43 MILLION/UL (ref 3.88–5.62)
SODIUM SERPL-SCNC: 139 MMOL/L (ref 136–145)
WBC # BLD AUTO: 11.57 THOUSAND/UL (ref 4.31–10.16)

## 2022-04-29 PROCEDURE — 94760 N-INVAS EAR/PLS OXIMETRY 1: CPT

## 2022-04-29 PROCEDURE — 99232 SBSQ HOSP IP/OBS MODERATE 35: CPT | Performed by: STUDENT IN AN ORGANIZED HEALTH CARE EDUCATION/TRAINING PROGRAM

## 2022-04-29 PROCEDURE — 99223 1ST HOSP IP/OBS HIGH 75: CPT | Performed by: PHYSICIAN ASSISTANT

## 2022-04-29 PROCEDURE — 80048 BASIC METABOLIC PNL TOTAL CA: CPT

## 2022-04-29 PROCEDURE — 97167 OT EVAL HIGH COMPLEX 60 MIN: CPT

## 2022-04-29 PROCEDURE — 85025 COMPLETE CBC W/AUTO DIFF WBC: CPT

## 2022-04-29 PROCEDURE — 97163 PT EVAL HIGH COMPLEX 45 MIN: CPT | Performed by: PHYSICAL THERAPIST

## 2022-04-29 PROCEDURE — 94762 N-INVAS EAR/PLS OXIMTRY CONT: CPT

## 2022-04-29 PROCEDURE — 94640 AIRWAY INHALATION TREATMENT: CPT

## 2022-04-29 PROCEDURE — 97535 SELF CARE MNGMENT TRAINING: CPT

## 2022-04-29 RX ORDER — MIDODRINE HYDROCHLORIDE 5 MG/1
5 TABLET ORAL
Status: DISCONTINUED | OUTPATIENT
Start: 2022-04-29 | End: 2022-04-29

## 2022-04-29 RX ORDER — FORMOTEROL FUMARATE 20 UG/2ML
20 SOLUTION RESPIRATORY (INHALATION)
Status: DISCONTINUED | OUTPATIENT
Start: 2022-04-29 | End: 2022-05-04 | Stop reason: HOSPADM

## 2022-04-29 RX ORDER — METHYLPREDNISOLONE SODIUM SUCCINATE 40 MG/ML
40 INJECTION, POWDER, LYOPHILIZED, FOR SOLUTION INTRAMUSCULAR; INTRAVENOUS EVERY 8 HOURS SCHEDULED
Status: DISCONTINUED | OUTPATIENT
Start: 2022-04-29 | End: 2022-05-02

## 2022-04-29 RX ADMIN — GABAPENTIN 300 MG: 300 CAPSULE ORAL at 08:05

## 2022-04-29 RX ADMIN — GABAPENTIN 300 MG: 300 CAPSULE ORAL at 21:36

## 2022-04-29 RX ADMIN — BUSPIRONE HYDROCHLORIDE 10 MG: 10 TABLET ORAL at 08:05

## 2022-04-29 RX ADMIN — IPRATROPIUM BROMIDE AND ALBUTEROL SULFATE 3 ML: 2.5; .5 SOLUTION RESPIRATORY (INHALATION) at 14:41

## 2022-04-29 RX ADMIN — FINASTERIDE 5 MG: 5 TABLET, FILM COATED ORAL at 08:05

## 2022-04-29 RX ADMIN — GABAPENTIN 300 MG: 300 CAPSULE ORAL at 16:12

## 2022-04-29 RX ADMIN — BUSPIRONE HYDROCHLORIDE 10 MG: 10 TABLET ORAL at 16:12

## 2022-04-29 RX ADMIN — MIRTAZAPINE 15 MG: 15 TABLET, FILM COATED ORAL at 21:37

## 2022-04-29 RX ADMIN — CHOLESTYRAMINE 4 G: 4 POWDER, FOR SUSPENSION ORAL at 08:06

## 2022-04-29 RX ADMIN — CARBIDOPA AND LEVODOPA 1 TABLET: 25; 100 TABLET ORAL at 16:12

## 2022-04-29 RX ADMIN — CARBIDOPA AND LEVODOPA 1 TABLET: 25; 100 TABLET ORAL at 11:29

## 2022-04-29 RX ADMIN — METHYLPREDNISOLONE SODIUM SUCCINATE 40 MG: 40 INJECTION, POWDER, FOR SOLUTION INTRAMUSCULAR; INTRAVENOUS at 22:40

## 2022-04-29 RX ADMIN — ENOXAPARIN SODIUM 40 MG: 40 INJECTION SUBCUTANEOUS at 08:08

## 2022-04-29 RX ADMIN — AMLODIPINE BESYLATE 10 MG: 10 TABLET ORAL at 08:05

## 2022-04-29 RX ADMIN — PANTOPRAZOLE SODIUM 40 MG: 20 TABLET, DELAYED RELEASE ORAL at 17:08

## 2022-04-29 RX ADMIN — BUSPIRONE HYDROCHLORIDE 10 MG: 10 TABLET ORAL at 21:36

## 2022-04-29 RX ADMIN — METHYLPREDNISOLONE SODIUM SUCCINATE 40 MG: 40 INJECTION, POWDER, FOR SOLUTION INTRAMUSCULAR; INTRAVENOUS at 16:12

## 2022-04-29 RX ADMIN — CARBIDOPA AND LEVODOPA 1 TABLET: 25; 100 TABLET ORAL at 07:36

## 2022-04-29 RX ADMIN — BUDESONIDE 0.5 MG: 0.5 INHALANT ORAL at 08:30

## 2022-04-29 RX ADMIN — LORAZEPAM 1 MG: 1 TABLET ORAL at 01:26

## 2022-04-29 RX ADMIN — PANTOPRAZOLE SODIUM 40 MG: 20 TABLET, DELAYED RELEASE ORAL at 08:05

## 2022-04-29 RX ADMIN — BUDESONIDE 0.5 MG: 0.5 INHALANT ORAL at 20:29

## 2022-04-29 RX ADMIN — TAMSULOSIN HYDROCHLORIDE 0.4 MG: 0.4 CAPSULE ORAL at 08:05

## 2022-04-29 RX ADMIN — ACETAMINOPHEN 650 MG: 325 TABLET, FILM COATED ORAL at 22:35

## 2022-04-29 RX ADMIN — IPRATROPIUM BROMIDE AND ALBUTEROL SULFATE 3 ML: 2.5; .5 SOLUTION RESPIRATORY (INHALATION) at 20:29

## 2022-04-29 RX ADMIN — FLUTICASONE PROPIONATE 2 SPRAY: 50 SPRAY, METERED NASAL at 08:08

## 2022-04-29 RX ADMIN — IPRATROPIUM BROMIDE AND ALBUTEROL SULFATE 3 ML: 2.5; .5 SOLUTION RESPIRATORY (INHALATION) at 08:30

## 2022-04-29 NOTE — ASSESSMENT & PLAN NOTE
Acute on chronic, improving    Acute and chronic respiratory failure with hypoxia due to COPD exacerbation, as evidenced by respiratory distress, accessory muscle use, tachypnea on admission  Treated with BiPaP in ED, currently oxygenating well on 4L NC      · See plan under COPD exacerbation

## 2022-04-29 NOTE — PLAN OF CARE
Problem: Potential for Falls  Goal: Patient will remain free of falls  Description: INTERVENTIONS:  - Educate patient/family on patient safety including physical limitations  - Instruct patient to call for assistance with activity   - Consult OT/PT to assist with strengthening/mobility   - Keep Call bell within reach  - Keep bed low and locked with side rails adjusted as appropriate  - Keep care items and personal belongings within reach  - Initiate and maintain comfort rounds  - Make Fall Risk Sign visible to staff  - Offer Toileting every 2 Hours, in advance of need  - Initiate/Maintain bed alarm  - Obtain necessary fall risk management equipment:   - Apply yellow socks and bracelet for high fall risk patients  - Consider moving patient to room near nurses station  Outcome: Progressing     Problem: Nutrition/Hydration-ADULT  Goal: Nutrient/Hydration intake appropriate for improving, restoring or maintaining nutritional needs  Description: Monitor and assess patient's nutrition/hydration status for malnutrition  Collaborate with interdisciplinary team and initiate plan and interventions as ordered  Monitor patient's weight and dietary intake as ordered or per policy  Utilize nutrition screening tool and intervene as necessary  Determine patient's food preferences and provide high-protein, high-caloric foods as appropriate       INTERVENTIONS:  - Monitor oral intake, urinary output, labs, and treatment plans  - Assess nutrition and hydration status and recommend course of action  - Evaluate amount of meals eaten  - Assist patient with eating if necessary   - Allow adequate time for meals  - Recommend/ encourage appropriate diets, oral nutritional supplements, and vitamin/mineral supplements  - Order, calculate, and assess calorie counts as needed  - Recommend, monitor, and adjust tube feedings and TPN/PPN based on assessed needs  - Assess need for intravenous fluids  - Provide specific nutrition/hydration education as appropriate  - Include patient/family/caregiver in decisions related to nutrition  Outcome: Progressing     Problem: RESPIRATORY - ADULT  Goal: Achieves optimal ventilation and oxygenation  Description: INTERVENTIONS:  - Assess for changes in respiratory status  - Assess for changes in mentation and behavior  - Position to facilitate oxygenation and minimize respiratory effort  - Oxygen administered by appropriate delivery if ordered  - Initiate smoking cessation education as indicated  - Encourage broncho-pulmonary hygiene including cough, deep breathe, Incentive Spirometry  - Assess the need for suctioning and aspirate as needed  - Assess and instruct to report SOB or any respiratory difficulty  - Respiratory Therapy support as indicated  Outcome: Progressing     Problem: MOBILITY - ADULT  Goal: Maintain or return to baseline ADL function  Description: INTERVENTIONS:  -  Assess patient's ability to carry out ADLs; assess patient's baseline for ADL function and identify physical deficits which impact ability to perform ADLs (bathing, care of mouth/teeth, toileting, grooming, dressing, etc )  - Assess/evaluate cause of self-care deficits   - Assess range of motion  - Assess patient's mobility; develop plan if impaired  - Assess patient's need for assistive devices and provide as appropriate  - Encourage maximum independence but intervene and supervise when necessary  - Involve family in performance of ADLs  - Assess for home care needs following discharge   - Consider OT consult to assist with ADL evaluation and planning for discharge  - Provide patient education as appropriate  Outcome: Progressing  Goal: Maintains/Returns to pre admission functional level  Description: INTERVENTIONS:  - Perform BMAT or MOVE assessment daily    - Set and communicate daily mobility goal to care team and patient/family/caregiver     - Collaborate with rehabilitation services on mobility goals if consulted  - Perform Range of Motion   - Reposition patient every   - Dangle patient   - Stand patient   - Ambulate patient   - Out of bed to chair   - Out of bed for meals   - Out of bed for toileting  - Record patient progress and toleration of activity level   Outcome: Progressing     Problem: Prexisting or High Potential for Compromised Skin Integrity  Goal: Skin integrity is maintained or improved  Description: INTERVENTIONS:  - Identify patients at risk for skin breakdown  - Assess and monitor skin integrity  - Assess and monitor nutrition and hydration status  - Monitor labs   - Assess for incontinence   - Turn and reposition patient  - Assist with mobility/ambulation  - Relieve pressure over bony prominences  - Avoid friction and shearing  - Provide appropriate hygiene as needed including keeping skin clean and dry  - Evaluate need for skin moisturizer/barrier cream  - Collaborate with interdisciplinary team   - Patient/family teaching  - Consider wound care consult   Outcome: Progressing

## 2022-04-29 NOTE — PLAN OF CARE
Problem: Potential for Falls  Goal: Patient will remain free of falls  Description: INTERVENTIONS:  - Educate patient/family on patient safety including physical limitations  - Instruct patient to call for assistance with activity   - Consult OT/PT to assist with strengthening/mobility   - Keep Call bell within reach  - Keep bed low and locked with side rails adjusted as appropriate  - Keep care items and personal belongings within reach  - Initiate and maintain comfort rounds  - Make Fall Risk Sign visible to staff  - Offer Toileting every 2 Hours, in advance of need  - Initiate/Maintain bed alarm  - Obtain necessary fall risk management equipment:   - Apply yellow socks and bracelet for high fall risk patients  - Consider moving patient to room near nurses station  Outcome: Progressing     Problem: Nutrition/Hydration-ADULT  Goal: Nutrient/Hydration intake appropriate for improving, restoring or maintaining nutritional needs  Description: Monitor and assess patient's nutrition/hydration status for malnutrition  Collaborate with interdisciplinary team and initiate plan and interventions as ordered  Monitor patient's weight and dietary intake as ordered or per policy  Utilize nutrition screening tool and intervene as necessary  Determine patient's food preferences and provide high-protein, high-caloric foods as appropriate       INTERVENTIONS:  - Monitor oral intake, urinary output, labs, and treatment plans  - Assess nutrition and hydration status and recommend course of action  - Evaluate amount of meals eaten  - Assist patient with eating if necessary   - Allow adequate time for meals  - Recommend/ encourage appropriate diets, oral nutritional supplements, and vitamin/mineral supplements  - Order, calculate, and assess calorie counts as needed  - Recommend, monitor, and adjust tube feedings and TPN/PPN based on assessed needs  - Assess need for intravenous fluids  - Provide specific nutrition/hydration education as appropriate  - Include patient/family/caregiver in decisions related to nutrition  Outcome: Progressing     Problem: RESPIRATORY - ADULT  Goal: Achieves optimal ventilation and oxygenation  Description: INTERVENTIONS:  - Assess for changes in respiratory status  - Assess for changes in mentation and behavior  - Position to facilitate oxygenation and minimize respiratory effort  - Oxygen administered by appropriate delivery if ordered  - Initiate smoking cessation education as indicated  - Encourage broncho-pulmonary hygiene including cough, deep breathe, Incentive Spirometry  - Assess the need for suctioning and aspirate as needed  - Assess and instruct to report SOB or any respiratory difficulty  - Respiratory Therapy support as indicated  Outcome: Progressing     Problem: MOBILITY - ADULT  Goal: Maintain or return to baseline ADL function  Description: INTERVENTIONS:  -  Assess patient's ability to carry out ADLs; assess patient's baseline for ADL function and identify physical deficits which impact ability to perform ADLs (bathing, care of mouth/teeth, toileting, grooming, dressing, etc )  - Assess/evaluate cause of self-care deficits   - Assess range of motion  - Assess patient's mobility; develop plan if impaired  - Assess patient's need for assistive devices and provide as appropriate  - Encourage maximum independence but intervene and supervise when necessary  - Involve family in performance of ADLs  - Assess for home care needs following discharge   - Consider OT consult to assist with ADL evaluation and planning for discharge  - Provide patient education as appropriate  Outcome: Progressing  Goal: Maintains/Returns to pre admission functional level  Description: INTERVENTIONS:  - Perform BMAT or MOVE assessment daily    - Set and communicate daily mobility goal to care team and patient/family/caregiver     - Collaborate with rehabilitation services on mobility goals if consulted  - Out of bed for toileting  - Record patient progress and toleration of activity level   Outcome: Progressing     Problem: Prexisting or High Potential for Compromised Skin Integrity  Goal: Skin integrity is maintained or improved  Description: INTERVENTIONS:  - Identify patients at risk for skin breakdown  - Assess and monitor skin integrity  - Assess and monitor nutrition and hydration status  - Monitor labs   - Assess for incontinence   - Turn and reposition patient  - Assist with mobility/ambulation  - Relieve pressure over bony prominences  - Avoid friction and shearing  - Provide appropriate hygiene as needed including keeping skin clean and dry  - Evaluate need for skin moisturizer/barrier cream  - Collaborate with interdisciplinary team   - Patient/family teaching  - Consider wound care consult   Outcome: Progressing

## 2022-04-29 NOTE — PROGRESS NOTES
[facility] Practice Progress Note - Nile Garcia 65 y o  male MRN: [id number]    Unit/Bed#: [address] [place] [address] Encounter: [id number]     Summary:  Patient admitted for COPD exacerbation, mild wheezing on exam but CXR is unremarkable  He also has history of Parkinson's disease and has been shaking more recently  Possible component of COPD and worsening of his parkinson's versus decreased effectivity of carbidopa-levodopa  Will continue to evaluate  F:   None  E: Replete PRN  N: Diet Cardiovascular; Sodium 2 GM   D: Lovenox    * COPD with acute exacerbation [facility])  Assessment & Plan  Patient with a history of AAT deficiency and centrolobular emphysema presenting with SOB of 2-3 days  On home O2 2L and reports that he has had to increase O2 to 5L  125mg solumedrol given in ED   Trop neg    CTA: No evidence of acute PE  Pulmonary emphysema and parenchymal scarring without evidence of acute infiltrate  Trace bilateral effusions   CXR:  No acute cardiopulmonary disease    · Begin Solu-Medrol 40 mg ID daily  · Continuing home DuoNebs 3 mL 4 X daily, fluticasone, budesonide 0 5 mg BID, albuterol Q4 hrs PRN  · Continuous pulse oximetry  · Spirometry  · O2 supplementation, tapering as needed  · Pulmonology consulted, appreciate recommendations  · Patient receives weekly Zemaira injections, will order when indicated if patient remains in hospital    Centrilobular emphysema [facility])  Assessment & Plan  Patient seen by pulmonology outpatient yesterday, has been having respiratory distress for several days  Was given Solu-Medrol yesterday with a 5 day course of steroid taper  Presented to the ED with ongoing progressively worsening shortness of breath and accessory muscle use  He has history of emphysema, alpha-1 antitrypsin deficiency      · See under COPD exacerbation    Acute and chronic respiratory failure with hypoxia [facility])  Assessment & Plan  Acute on chronic, improving    Acute and chronic respiratory failure with hypoxia due to COPD exacerbation, as evidenced by respiratory distress, accessory muscle use, tachypnea on admission  Treated with BiPaP in ED, currently oxygenating well on 4L NC  · See plan under COPD exacerbation    Alpha-1-antitrypsin deficiency ([address] )  Assessment & Plan  · On Zemaira 1x week  · Will order when indicated if patient remains in hospital    Hypertensive urgency  Assessment & Plan  BP on arrival 223/98, no signs of end-organ damage at this time  Patient states that he is unable to take his medications today  · Continue Norvasc 10 mg PO daily  · Home midodrine held in the setting of uncontrolled BP  · Continue to monitor vital signs  · Will optimize medications as needed    Parkinson's disease [facility])  Assessment & Plan  Patient presented to the ED in respiratory distress  Possible component of COPD and worsening of his parkinson's versus carbidopa levodopa medication washout  He endorses compliance with meds  Will continue to evaluate   Followed and managed by DELVIN HIGUERA [facility] Neurology office  · Continue carbidopa levodopa  mg p o  TID    Anxiousness  Assessment & Plan  Stable,  · Continue BuSpar 10 mg PO TID  · Continue lorazepam 1 mg PO BID PRN    Depression, recurrent (HCC)  Assessment & Plan  Stable  · Continue mirtazapine 15 mg PO HS    Orthostatic hypotension with spine hypertension  Assessment & Plan  BP on arrival 223/98  · Continue hypertensive medications  · Home midodrine held in the setting of hypertensive emergency    Peripheral neuropathy  Assessment & Plan  Stable  · Continue gabapentin    Chronic diarrhea   Assessment & Plan  Stable  · Continue cholestyramine    Ambulatory dysfunction  Assessment & Plan  Chronic  · Fall precautions  · PT/OT    Insomnia  Assessment & Plan  Stable  · Continue home trazodone and mirtazapine     BPH (benign prostatic hyperplasia)  Assessment & Plan  Stable  · Continue Flomax and finasteride    Essential hypertension  Assessment & Plan  BP on admission  · Continue Norvasc 10 mg POD  · Home midodrine held in setting of hypertensive urgency, will restart where appropriate    Gastroesophageal reflux disease  Assessment & Plan  Stable  · Continue home Protonix 40 mg b i d       -------------------------------------------------------------------------------------    Disposition: Continue med surg level of care  Code Status: Level 1 - Full Code    ---------------------------------------------------------------------------------------  SUBJECTIVE  Patient evaluated at bedside and was calm, cooperative, no acute distress  His breathing has improved but he continues to be labored  States that his SOB is present but improved  Review of Systems    ---------------------------------------------------------------------------------------  OBJECTIVE    Vitals   Vitals:    22 0219 22 0300 22 0732 22 0831   BP:  134/74 170/83    BP Location:  Left arm Right arm    Pulse:  78 80 81   Resp:  19 19 18   Temp:  97 9 °F (36 6 °C) 97 8 °F (36 6 °C)    TempSrc:  Oral Oral    SpO2: 97% 96% 97% 97%   Weight:       Height:         Temp (24hrs), Av 6 °F (36 4 °C), Min:96 °F (35 6 °C), Max:98 5 °F (36 9 °C)  Current: Temperature: 97 8 °F (36 6 °C)          Respiratory:  SpO2: SpO2: 97 %, SpO2 Activity: SpO2 Activity: At Rest, SpO2 Device: O2 Device: Nasal cannula, Capnography:    Nasal Cannula O2 Flow Rate (L/min): 3 L/min    Physical Exam  Constitutional:       General: He is not in acute distress  Appearance: Normal appearance  Cardiovascular:      Rate and Rhythm: Normal rate and regular rhythm  Pulmonary:      Effort: No respiratory distress  Breath sounds: Decreased air movement present  Wheezing (mild, scattered) present  No rales  Abdominal:      General: Bowel sounds are normal       Palpations: Abdomen is soft  Tenderness: There is no abdominal tenderness  Musculoskeletal:      Right lower leg: No edema        Left lower leg: No edema  Neurological:      Mental Status: He is alert  Laboratory and Diagnostics:  Results from last 7 days   Lab Units 04/29/22  0624 04/28/22  1122   WBC Thousand/uL 11 57* 6 32   HEMOGLOBIN g/dL 14 3 14 7   HEMATOCRIT % 41 6 44 1   PLATELETS Thousands/uL 132* 118*   NEUTROS PCT % 79* 46   MONOS PCT % 5 6     Results from last 7 days   Lab Units 04/29/22  0512 04/28/22  1122   SODIUM mmol/L 139 141   POTASSIUM mmol/L 4 4 4 2   CHLORIDE mmol/L 103 103   CO2 mmol/L 27 30   ANION GAP mmol/L 9 8   BUN mg/dL 23 21   CREATININE mg/dL 1 07 1 23   CALCIUM mg/dL 9 3 9 0   GLUCOSE RANDOM mg/dL 127 115   ALT U/L  --  45   AST U/L  --  25   ALK PHOS U/L  --  76   ALBUMIN g/dL  --  3 9   TOTAL BILIRUBIN mg/dL  --  1 21*     Results from last 7 days   Lab Units 04/28/22  1122   MAGNESIUM mg/dL 2 0      Results from last 7 days   Lab Units 04/28/22  1122   INR  1 10   PTT seconds 29              ABG:  Results from last 7 days   Lab Units 04/28/22  1146   PH ART  7 430   PCO2 ART mm Hg 41 4   PO2 ART mm Hg 171 2*   HCO3 ART mmol/L 26 9   BASE EXC ART mmol/L 2 3   ABG SOURCE  Radial, Right     VBG:  Results from last 7 days   Lab Units 04/28/22  1146   ABG SOURCE  Radial, Right           Micro        Imaging: I have reviewed all pertinent reports    Intake and Output  I/O       04/27 0701 04/28 0700 04/28 0701  04/29 0700 04/29 0701  04/30 0700    Urine (mL/kg/hr)  500     Total Output  500     Net  -500                  Height and Weights   Height: 6' 5" (195 6 cm)  IBW (Ideal Body Weight): 89 1 kg  Body mass index is 21 04 kg/m²  Weight (last 2 days)     Date/Time Weight    04/28/22 1453 80 5 (177 47)            Nutrition       Diet Orders   (From admission, onward)             Start     Ordered    04/28/22 1522  Diet Cardiovascular;  Sodium 2 GM  Diet effective now        References:    Nutrtion Support Algorithm Enteral vs  Parenteral   Question Answer Comment   Diet Type Cardiovascular    Cardiac Sodium 2 GM    RD to adjust diet per protocol? Yes        04/28/22 1524                  Active Medications  Scheduled Meds:  Current Facility-Administered Medications   Medication Dose Route Frequency Provider Last Rate    acetaminophen  650 mg Oral Q6H PRN Awilda Green MD      albuterol  2 puff Inhalation Q4H PRN Awilda Green MD      amLODIPine  10 mg Oral Daily Awilda Green MD      budesonide  0 5 mg Nebulization BID Awilda Green MD      busPIRone  10 mg Oral TID Awilda Green MD      carbidopa-levodopa  1 tablet Oral TID With Meals Awilda Green MD      cholestyramine sugar free  4 g Oral Daily Awilda Green MD      enoxaparin  40 mg Subcutaneous Daily Awilda Green MD      finasteride  5 mg Oral QAM Awilda Green MD      fluticasone  2 spray Nasal Daily Awilda Green MD      gabapentin  300 mg Oral TID Awilda Green MD      ipratropium-albuterol  3 mL Nebulization 4x Daily Awilda Green MD      midodrine  5 mg Oral TID [address] , MD      mirtazapine  15 mg Oral HS Awilda Green MD      pantoprazole  40 mg Oral BID Awilda Green MD      tamsulosin  0 4 mg Oral Daily Awilda Green MD       Continuous Infusions:     PRN Meds:   acetaminophen, 650 mg, Q6H PRN  albuterol, 2 puff, Q4H PRN        Invasive Devices Review  Invasive Devices  Report    Peripheral Intravenous Line            Peripheral IV 04/28/22 Right Antecubital <1 day                Awilda Green MD  Family Medicine PGY-1    Portions of the record may have been created with voice recognition software  Occasional wrong word or "sound a like" substitutions may have occurred due to the inherent limitations of voice recognition software    Read the chart carefully and recognize, using context, where substitutions have occurred

## 2022-04-29 NOTE — CONSULTS
Consultation - Pulmonary Medicine  [place] 65 y o  male MRN: [id number]  Unit/Bed#: [address] [place] [address] Encounter: [id number]  Code Status: Level 1 - Full Code    Assessment/Plan:    [address] Sascha Blank is a 65 y o  Past Medical History:   Diagnosis Date    Anesthesia complication     Difficult to wake up    Arthritis     BPH (benign prostatic hyperplasia)     urinary frequency    Cancer (HCC)     basal cell neck, face    COPD (chronic obstructive pulmonary disease) (HCC)     COPD exacerbation ([facility] ) 12/29/2019    Full dentures     Hiatal hernia     History of methicillin resistant staphylococcus aureus (MRSA)     10/11/2018 MRSA (nares) positive    Hypertension     Irritable bowel syndrome     Kidney stone     at least 7 episodes    Liver disease     Alpha 1- enzyme deficiency - diagnosed 2002   has been on weekly replacement therapy since then    Pulmonary emphysema ([facility] )     1/25/15  FEV1 - 2 45 liters or 59% of predicted    RSV infection 12/2017    Wears glasses     for driving only       Assessment/Plan:     Dyspnea:  -unclear if worsening baseline but is without worsening hypoxemia  -not overtly in COPD exacerbation   -consideration for steroid trial and if non improved recommendation for cardiology consultation     Chronic hypoxemic respiratory failure:  -patient is typically on 3 L NC  -has chronic dyspnea on exertion with recent worsening      Severe emphysematous COPD with Possible Acute Exacerbation:  -FEV 1 40% as of Jan 2020  -unclear if this is exacerbation or generally worsening baseline  -has recently been on steroid taper in OP setting  -trial for improvement w/ methylprednisolone and may require chronic low dose steroid therapy      Alpha-1 antitrypsin deficiency:  -is on weekly Zemaira injections       Thank you for this consultation; we will be happy to follow with you     ______________________________________________________________________      History of Present Illness   Physician Requesting Consult: Jakub Watson MD  Reason for Consult :  COPD Exacerbation      HPI:  Jesus Ortega is a 65 y o  male  has a past medical history of Anesthesia complication, Arthritis, BPH (benign prostatic hyperplasia), Cancer ([facility] ), COPD (chronic obstructive pulmonary disease) ([facility] ), COPD exacerbation ([facility] ) (2019), Full dentures, Hiatal hernia, History of methicillin resistant staphylococcus aureus (MRSA), Hypertension, Irritable bowel syndrome, Kidney stone, Liver disease, Pulmonary emphysema ([facility] ), RSV infection (2017), and Wears glasses  Timur Jayesh is well known to Pulmonary associates and has a known past medical history of severe emphysematous COPD 2020 FEV1  40% chronically managed with Pulmicort DuoNeb,  chronic oxygen dependence of 2-3 L, 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 diagnosed in , HTN, hyperlipidemia, GERD   History of PE 2018 post 6 months of Eliquis treatment, off anticoagulation  Small chronic thrombus  Historical stable lung nodules     He is admitted the hospital for progressive shortness of breath and recent ED visit for same and stating that he became short of breath the day PTA even on awakening and just stepping out of bed  Has no significant worsening hypoxemia  No significant events or sick contacts  No cough, fever, chills, mucus production increase  No hemoptysis  No wheezing        Pulmonary consulted for dyspnea with question of AECOPD  Discussed w/ patient and [facility] team for consideration of underlying mild AECOPD  Additionally pt is on SBA and ICS therapy at home  Will add nebulized LABA back to treatment therapy and monitor for improvement as well as w/ steroid therapy    If non improved would give consideration for reevaluation of cardiac status w/ consideration for NMST       PFT imaging   Pulmonary Function Test Report   Jesus Ortega 75 y o  male MRN: [id number]   Date of Testin2020   Date of Interpretation: 01/28/2020   Requesting Physician: Hailey Villarreal PA-C   Reason for Testing: SOB   Reference set for interpretation: NHANES III   Procedure: Testing was completed at the bedside  The patient demonstrated good effort and cooperation  The results of this test meet ATS standards for acceptability and repeatability  Data set appears appropriate for interpretation  Post bronchodilator testing performed after the administration of 2 5mg albuterol in 3cc normal saline administered via nebulizer per bronchodilator protocol  Results:   FEV1/FVC Ratio: 45 %   Forced Vital Capacity: 3 56 L 65 % predicted   FEV1: 1 59 L 40 % predicted   Interpretation:   Moderate obstructive airflow defect   There is significant airway response with the administration of bronchodilator per ATS standards  Flow volume loop is consistent with obstruction  LEANDER Bowen  [facility]       Review of Systems   Constitutional: Negative for activity change, chills, fatigue, fever and unexpected weight change  HENT: Negative for congestion, postnasal drip and rhinorrhea  Eyes: Negative for visual disturbance  Respiratory: Positive for shortness of breath  Negative for cough, chest tightness, wheezing and stridor  Cardiovascular: Negative for chest pain and leg swelling  Gastrointestinal: Negative for abdominal pain, diarrhea, nausea and vomiting  Endocrine: Negative for cold intolerance and heat intolerance  Genitourinary: Negative for flank pain  Musculoskeletal: Negative for arthralgias, joint swelling and myalgias  Skin: Negative for color change  Allergic/Immunologic: Negative for environmental allergies  Neurological: Negative for syncope and light-headedness  Hematological: Negative for adenopathy  Psychiatric/Behavioral: Negative for confusion         Past Medical/Surgical History  Past Medical History:   Diagnosis Date    Anesthesia complication     Difficult to wake up    Arthritis     BPH (benign prostatic hyperplasia)     urinary frequency    Cancer (HCC)     basal cell neck, face    COPD (chronic obstructive pulmonary disease) (HCC)     COPD exacerbation ([facility] ) 2019    Full dentures     Hiatal hernia     History of methicillin resistant staphylococcus aureus (MRSA)     10/11/2018 MRSA (nares) positive    Hypertension     Irritable bowel syndrome     Kidney stone     at least 7 episodes    Liver disease     Alpha 1- enzyme deficiency - diagnosed   has been on weekly replacement therapy since then    Pulmonary emphysema ([facility] [address] )     1/25/15  FEV1 - 2 45 liters or 59% of predicted    RSV infection 2017    Wears glasses     for driving only     Past Surgical History:   Procedure Laterality Date    BACK SURGERY      discectomy    COLONOSCOPY      COLONOSCOPY N/A 3/10/2017    Procedure: Earlene Brodie;  Surgeon: Roxanna Chicas MD;  Location: [facility]; Service:    Lisa Fan      removal of kidney stones    ESOPHAGOGASTRODUODENOSCOPY N/A 3/10/2017    Procedure: ESOPHAGOGASTRODUODENOSCOPY (EGD); Surgeon: Roxanna Chicas MD;  Location: [facility]; Service:     LITHOTRIPSY      [place] ESOPHAGOGASTRODUODENOSCOPY TRANSORAL DIAGNOSTIC N/A 2018    Procedure: ESOPHAGOGASTRODUODENOSCOPY (EGD); Surgeon: Roxanna Chicas MD;  Location: [facility];   Service: Gastroenterology    TONSILLECTOMY      VEIN LIGATION AND STRIPPING Bilateral            Social History  Social History     Substance and Sexual Activity   Alcohol Use Not Currently    Comment: stopped in      Social History     Substance and Sexual Activity   Drug Use Not Currently     Social History     Tobacco Use   Smoking Status Former Smoker    Packs/day: 1 00    Years: 60 00    Pack years: 60 00    Quit date: 2017    Years since quittin 6   Smokeless Tobacco Never Used   Tobacco Comment    quit in 2017       Family History  Family History Problem Relation Age of Onset    Emphysema Mother         never smoked    Emphysema Father     Cancer Brother         colon    Colon cancer Brother     Ulcerative colitis Family     Liver disease Family        Allergies  Allergies   Allergen Reactions    Chantix [Varenicline]      Caused prostate infection       Home Meds:   Medications Prior to Admission   Medication Sig Dispense Refill Last Dose    amLODIPine (NORVASC) 10 mg tablet TAKE ONE TABLET BY MOUTH DAILY  30 tablet 6 4/27/2022 at Unknown time    busPIRone (BUSPAR) 10 mg tablet TAKE ONE TABLET BY MOUTH THREE TIMES DAILY  90 tablet 3 4/27/2022 at Unknown time    carbamide peroxide (DEBROX) 6 5 % otic solution Administer 5 drops into both ears 2 (two) times a day as needed for ear pain 15 mL 0 Past Month at Unknown time    cholestyramine (QUESTRAN) 4 g packet Take 1 packet by mouth daily   4/27/2022 at Unknown time    doxylamine (Unisom SleepTabs) 25 MG tablet Take 25 mg by mouth daily at bedtime as needed for sleep   4/27/2022 at Unknown time    finasteride (PROSCAR) 5 mg tablet TAKE ONE TABLET BY MOUTH IN THE MORNING  90 tablet 3 4/27/2022 at Unknown time    fluticasone (FLONASE) 50 mcg/act nasal spray 2 sprays into each nostril daily 16 g 5 4/27/2022 at Unknown time    gabapentin (NEURONTIN) 300 mg capsule TAKE ONE CAPSULE BY MOUTH THREE TIMES DAILY 90 capsule 0 4/27/2022 at Unknown time    ipratropium-albuterol (DUO-NEB) 0 5-2 5 mg/3 mL nebulizer solution Take 3 mL by nebulization 4 (four) times a day 360 mL 5 4/27/2022 at Unknown time    LORazepam (ATIVAN) 1 mg tablet    4/27/2022 at Unknown time    mirtazapine (REMERON) 15 mg tablet Take 1 tablet (15 mg total) by mouth daily at bedtime 30 tablet 5 4/27/2022 at Unknown time    pantoprazole (PROTONIX) 40 mg tablet TAKE ONE TABLET BY MOUTH TWICE DAILY 180 tablet 0 4/27/2022 at Unknown time    predniSONE 20 mg tablet Three tablets daily for 3 days, 2 tablets daily for 3 days, 1 tablet daily for 10 days 20 tablet 0 4/27/2022 at Unknown time    tamsulosin (FLOMAX) 0 4 mg TAKE ONE CAPSULE BY MOUTH DAILY 90 capsule 0 4/27/2022 at Unknown time    Ventolin  (90 Base) MCG/ACT inhaler Inhale 2 puffs every 4 (four) hours as needed for wheezing 18 g 5 4/27/2022 at Unknown time    ZEMAIRA infusion once a week    Past Week at Unknown time    budesonide (PULMICORT) 0 5 mg/2 mL nebulizer solution Take 2 mL (0 5 mg total) by nebulization 2 (two) times a day 360 mL 11     carbidopa-levodopa (SINEMET)  mg per tablet TAKE HALF TABLET BY MOUTH 30 minutes prior to breakfast, lunch, and dinner for 1 week  then increase to 1 tablet prior to each meal (Patient not taking: Reported on 4/28/2022 ) 90 tablet 0 Not Taking at Unknown time    Diclofenac Sodium (VOLTAREN) 1 % Apply 2 g topically 4 (four) times a day for 7 days Apply to R knee 56 g 0     midodrine (PROAMATINE) 10 MG tablet Take 1 tab three times daily  Please hold medication if blood pressure is above 069 systolic   90 tablet 4 Unknown at Unknown time    OXYGEN-HELIUM IN Inhale 2L at rest- 3L with activity          Current Meds:   Scheduled Meds:  Current Facility-Administered Medications   Medication Dose Route Frequency Provider Last Rate    acetaminophen  650 mg Oral Q6H PRN Zana Wagner MD      albuterol  2 puff Inhalation Q4H PRN Zana Wagner MD      amLODIPine  10 mg Oral Daily Zana Wagner MD      budesonide  0 5 mg Nebulization BID Zana Wagner MD      busPIRone  10 mg Oral TID Zana Wagner MD      carbidopa-levodopa  1 tablet Oral TID With Meals Zana Wagner MD      cholestyramine sugar free  4 g Oral Daily Zana Wagner MD      enoxaparin  40 mg Subcutaneous Daily Zana Wagner MD      finasteride  5 mg Oral QAM Zana Wagner MD      fluticasone  2 spray Nasal Daily Zana Wagner MD      gabapentin  300 mg Oral TID Mati Prieto Jp Rivas MD      ipratropium-albuterol  3 mL Nebulization 4x Daily Sameera Cueto MD      mirtazapine  15 mg Oral HS Sameera Cueto MD      pantoprazole  40 mg Oral BID Sameera Cueto MD      tamsulosin  0 4 mg Oral Daily Sameera Cueto MD       PRN Meds:acetaminophen, 650 mg, Q6H PRN  albuterol, 2 puff, Q4H PRN      IV Infusions:        ____________________________________________________________________    Objective   Vitals:   Vitals:    22 0300 22 0732 22 0831 22 1054   BP: 134/74 170/83  164/77   BP Location: Left arm Right arm  Right arm   Pulse: 78 80 81 84   Resp:     Temp: 97 9 °F (36 6 °C) 97 8 °F (36 6 °C)     TempSrc: Oral Oral     SpO2: 96% 97% 97%    Weight:       Height:         Weight (last 2 days)     Date/Time Weight    22 1453 80 5 (177 47)        Temp (24hrs), Av 9 °F (36 6 °C), Min:97 5 °F (36 4 °C), Max:98 5 °F (36 9 °C)  Current: Temperature: 97 8 °F (36 6 °C)        Invasive/non-invasive ventilation settings   Respiratory  Report   Lab Data (Last 4 hours)    None         O2/Vent Data (Last 4 hours)    None                Nutrition:        Diet Orders   (From admission, onward)             Start     Ordered    22 1336  Diet Cardiovascular; Sodium 4 Gm (JEEVAN)  Diet effective now        References:    Nutrtion Support Algorithm Enteral vs  Parenteral   Question Answer Comment   Diet Type Cardiovascular    Cardiac Sodium 4 Gm (JEEVAN)    RD to adjust diet per protocol?  Yes        22 1336    22 1018  Room Service  Once        Question:  Type of Service  Answer:  Room Service - Appropriate with Assistance    22 1017                Ins/Outs:   I/O        07 07 07 07 07 0700    Urine (mL/kg/hr)  500     Total Output  500     Net  -500                  Lines/Drains:  Invasive Devices  Report    Peripheral Intravenous Line            Peripheral IV 22 Right Antecubital 1 day              ___________________________________________________________________    Physical Exam  Constitutional:       Appearance: He is well-developed  HENT:      Head: Normocephalic and atraumatic  Eyes:      Conjunctiva/sclera: Conjunctivae normal       Pupils: Pupils are equal, round, and reactive to light  Cardiovascular:      Rate and Rhythm: Normal rate and regular rhythm  Heart sounds: Normal heart sounds  No murmur heard  No friction rub  No gallop  Pulmonary:      Effort: Pulmonary effort is normal  No respiratory distress  Breath sounds: Normal breath sounds  No wheezing or rales  Comments: 96% on 3 L NC  Chest:      Chest wall: No tenderness  Abdominal:      General: Bowel sounds are normal       Palpations: Abdomen is soft  Musculoskeletal:         General: Normal range of motion  Cervical back: Normal range of motion and neck supple  Skin:     General: Skin is warm and dry  Neurological:      Mental Status: He is alert and oriented to person, place, and time         ___________________________________________________________________    Laboratory and Diagnostics:  Results from last 7 days   Lab Units 04/29/22  0624 04/28/22  1122   WBC Thousand/uL 11 57* 6 32   HEMOGLOBIN g/dL 14 3 14 7   HEMATOCRIT % 41 6 44 1   PLATELETS Thousands/uL 132* 118*   NEUTROS PCT % 79* 46   MONOS PCT % 5 6     Results from last 7 days   Lab Units 04/29/22  0512 04/28/22  1122   SODIUM mmol/L 139 141   POTASSIUM mmol/L 4 4 4 2   CHLORIDE mmol/L 103 103   CO2 mmol/L 27 30   ANION GAP mmol/L 9 8   BUN mg/dL 23 21   CREATININE mg/dL 1 07 1 23   CALCIUM mg/dL 9 3 9 0   GLUCOSE RANDOM mg/dL 127 115   ALT U/L  --  45   AST U/L  --  25   ALK PHOS U/L  --  76   ALBUMIN g/dL  --  3 9   TOTAL BILIRUBIN mg/dL  --  1 21*     Results from last 7 days   Lab Units 04/28/22  1122   MAGNESIUM mg/dL 2 0      Results from last 7 days   Lab Units 04/28/22  1122   INR  1 10   PTT seconds 29              ABG:  Results from last 7 days   Lab Units 04/28/22  1146   PH ART  7 430   PCO2 ART mm Hg 41 4   PO2 ART mm Hg 171 2*   HCO3 ART mmol/L 26 9   BASE EXC ART mmol/L 2 3   ABG SOURCE  Radial, Right     VBG:  Results from last 7 days   Lab Units 04/28/22  1146   ABG SOURCE  Radial, Right           Micro        Imaging:   CTA ED chest PE Study   Final Result by Gopi Aggarwal MD (04/28 6970)      1  No evidence of acute pulmonary embolism  2  Pulmonary emphysema and parenchymal scarring without evidence of acute infiltrate   3  Trace bilateral effusions                     Workstation performed: [id number]         XR chest 1 view portable   Final Result by Clint Gregory MD (04/28 3499)      No acute cardiopulmonary disease  Workstation performed: [id number]               Micro:   Lab Results   Component Value Date    BLOODCX No Growth After 5 Days  08/08/2021    BLOODCX No Growth After 5 Days  08/08/2021    BLOODCX No Growth After 5 Days   12/29/2017    WOUNDCULT 3+ Growth of Staphylococcus aureus (A) 06/12/2019    MRSACULTURE  11/11/2019     No Methicillin Resistant Staphlyococcus aureus (MRSA) isolated        ____________________________________________________________________

## 2022-04-29 NOTE — PLAN OF CARE
Problem: PHYSICAL THERAPY ADULT  Goal: Performs mobility at highest level of function for planned discharge setting  See evaluation for individualized goals  Description: Treatment/Interventions: ADL retraining,Functional transfer training,LE strengthening/ROM,Therapeutic exercise,Endurance training,Patient/family training,Bed mobility,Gait training,OT,Spoke to nursing          See flowsheet documentation for full assessment, interventions and recommendations  Note: Prognosis: Good  Problem List: Decreased strength,Decreased range of motion,Decreased endurance,Impaired balance,Decreased mobility  Assessment: Patient seen for Physical Therapy evaluation  Patient admitted with COPD with acute exacerbation ([facility] )  Comorbidities affecting patient's physical performance include: BPH, chronic diarrhea, centrilobular emphysema, anxiety, GERD, hypertension, neuropathy and Parkinsons  Personal factors affecting patient at time of initial evaluation include: stairs to enter home, inability to ambulate household distances, inability to navigate community distances, limited home support, positive fall history, inability to perform physical activity, inability to perform ADLS and inability to perform IADLS   Prior to admission, patient was independent with ADLS, independent with IADLS, living alone in single story home with 2 steps to enter, ambulating household distance, ambulating community distances and retired  Please find objective findings from Physical Therapy assessment regarding body systems outlined above with impairments and limitations including weakness, impaired balance, decreased endurance, impaired coordination, gait deviations, decreased activity tolerance, decreased functional mobility tolerance, fall risk and SOB upon exertion  The Barthel Index was used as a functional outcome tool presenting with a score of   today indicating marked limitations of functional mobility and ADLS    Patient's clinical presentation is currently unstable/unpredictable as seen in patient's presentation of vital sign response, increased fall risk, new onset of impairment of functional mobility, decreased endurance and new onset of weakness  Pt would benefit from continued Physical Therapy treatment to address deficits as defined above and maximize level of functional mobility  As demonstrated by objective findings, the assigned level of complexity for this evaluation is high  The patient's AM-PAC Basic Mobility Inpatient Short Form Raw Score is 16    A Raw score of less than or equal to 16 suggests the patient may benefit from discharge to post-acute rehabilitation services  Please also refer to the recommendation of the Physical Therapist for safe discharge planning  Barriers to Discharge: Decreased caregiver support        PT Discharge Recommendation: Post acute rehabilitation services          See flowsheet documentation for full assessment

## 2022-04-29 NOTE — PLAN OF CARE
Problem: Potential for Falls  Goal: Patient will remain free of falls  Description: INTERVENTIONS:  - Educate patient/family on patient safety including physical limitations  - Instruct patient to call for assistance with activity   - Consult OT/PT to assist with strengthening/mobility   - Keep Call bell within reach  - Keep bed low and locked with side rails adjusted as appropriate  - Keep care items and personal belongings within reach  - Initiate and maintain comfort rounds  - Make Fall Risk Sign visible to staff  - Offer Toileting every 2 Hours, in advance of need  - Initiate/Maintain bed alarm  - Obtain necessary fall risk management equipment:   - Apply yellow socks and bracelet for high fall risk patients  - Consider moving patient to room near nurses station  Outcome: Progressing     Problem: Nutrition/Hydration-ADULT  Goal: Nutrient/Hydration intake appropriate for improving, restoring or maintaining nutritional needs  Description: Monitor and assess patient's nutrition/hydration status for malnutrition  Collaborate with interdisciplinary team and initiate plan and interventions as ordered  Monitor patient's weight and dietary intake as ordered or per policy  Utilize nutrition screening tool and intervene as necessary  Determine patient's food preferences and provide high-protein, high-caloric foods as appropriate       INTERVENTIONS:  - Monitor oral intake, urinary output, labs, and treatment plans  - Assess nutrition and hydration status and recommend course of action  - Evaluate amount of meals eaten  - Assist patient with eating if necessary   - Allow adequate time for meals  - Recommend/ encourage appropriate diets, oral nutritional supplements, and vitamin/mineral supplements  - Order, calculate, and assess calorie counts as needed  - Recommend, monitor, and adjust tube feedings and TPN/PPN based on assessed needs  - Assess need for intravenous fluids  - Provide specific nutrition/hydration education as appropriate  - Include patient/family/caregiver in decisions related to nutrition  Outcome: Progressing     Problem: RESPIRATORY - ADULT  Goal: Achieves optimal ventilation and oxygenation  Description: INTERVENTIONS:  - Assess for changes in respiratory status  - Assess for changes in mentation and behavior  - Position to facilitate oxygenation and minimize respiratory effort  - Oxygen administered by appropriate delivery if ordered  - Initiate smoking cessation education as indicated  - Encourage broncho-pulmonary hygiene including cough, deep breathe, Incentive Spirometry  - Assess the need for suctioning and aspirate as needed  - Assess and instruct to report SOB or any respiratory difficulty  - Respiratory Therapy support as indicated  Outcome: Progressing     Problem: MOBILITY - ADULT  Goal: Maintain or return to baseline ADL function  Description: INTERVENTIONS:  -  Assess patient's ability to carry out ADLs; assess patient's baseline for ADL function and identify physical deficits which impact ability to perform ADLs (bathing, care of mouth/teeth, toileting, grooming, dressing, etc )  - Assess/evaluate cause of self-care deficits   - Assess range of motion  - Assess patient's mobility; develop plan if impaired  - Assess patient's need for assistive devices and provide as appropriate  - Encourage maximum independence but intervene and supervise when necessary  - Involve family in performance of ADLs  - Assess for home care needs following discharge   - Consider OT consult to assist with ADL evaluation and planning for discharge  - Provide patient education as appropriate  Outcome: Progressing  Goal: Maintains/Returns to pre admission functional level  Description: INTERVENTIONS:  - Perform BMAT or MOVE assessment daily    - Set and communicate daily mobility goal to care team and patient/family/caregiver     - Collaborate with rehabilitation services on mobility goals if consulted  - Perform Range of Motion 3 times a day    - Out of bed for toileting  - Record patient progress and toleration of activity level   Outcome: Progressing     Problem: Prexisting or High Potential for Compromised Skin Integrity  Goal: Skin integrity is maintained or improved  Description: INTERVENTIONS:  - Identify patients at risk for skin breakdown  - Assess and monitor skin integrity  - Assess and monitor nutrition and hydration status  - Monitor labs   - Assess for incontinence   - Turn and reposition patient  - Assist with mobility/ambulation  - Relieve pressure over bony prominences  - Avoid friction and shearing  - Provide appropriate hygiene as needed including keeping skin clean and dry  - Evaluate need for skin moisturizer/barrier cream  - Collaborate with interdisciplinary team   - Patient/family teaching  - Consider wound care consult   Outcome: Progressing

## 2022-04-29 NOTE — OCCUPATIONAL THERAPY NOTE
Occupational Therapy Evaluation/Treatment Note       04/29/22 1115   Note Type   Note type Evaluation   Restrictions/Precautions   Other Precautions Chair Alarm; Bed Alarm;O2;Fall Risk   Pain Assessment   Pain Assessment Tool 0-10   Pain Score No Pain   Home Living   Type of [address] [place] [address] One level;Stairs to enter without rails  (2 TEMI)   Bathroom Shower/Tub Walk-in shower   Bathroom Toilet Standard   Bathroom Equipment Grab bars in shower; Shower chair   Home Equipment Walker;Cane   Additional Comments Patient presented to hospital with c/o SOB; dx COPD exacerbation   Prior Function   Level of [place] Independent with ADLs and functional mobility   Lives With Alone   Receives Help From Family   ADL Assistance Independent   IADLs Independent   Falls in the last 6 months 1 to 4   Comments Patient reports PTA independent in ADLs; ambulating with cane occassionally; on 3L Home O2 and was attending outpatient PT and OT   ADL   Eating Assistance 7  Independent   Grooming Assistance 5  Supervision/Setup   [address] 4  Minimal Assistance    [address]  [address] 4  [address]  4  [address]   Additional Comments ADLs limited by increased SOB   Bed Mobility   Additional Comments Not assessed, patient received seated EOB at start of session   Transfers   Sit to Stand 4  Minimal assistance   Additional items Assist x 1   Stand to Sit 4  Minimal assistance   Additional items Assist x 1   Functional Mobility   Functional Mobility 4  Minimal assistance   Additional Comments Few steps without AD; patient moderately unsteady and reaching out to hold onto furniture   Balance   Static Sitting Fair +   Dynamic Sitting Fair   Static Standing Fair   Dynamic Standing Fair -   Activity Tolerance   Activity Tolerance Patient limited by fatigue  (Limited by SOB)   RUE Assessment   RUE Assessment WFL   LUE Assessment   LUE Assessment WFL   Cognition   Overall Cognitive Status WFL   Arousal/Participation Alert; Cooperative   Attention Within functional limits   Orientation Level Oriented X4   Memory Within functional limits   Following Commands Follows all commands and directions without difficulty   Assessment   Limitation Decreased ADL status; Decreased UE strength;Decreased Safe judgement during ADL;Decreased endurance;Decreased self-care trans;Decreased high-level ADLs   Prognosis Good   Assessment Patient evaluated by Occupational Therapy  Patient admitted with COPD with acute exacerbation ([facility] [address] )  The patients occupational profile, medical and therapy history includes a extensive additional review of physical, cognitive, or psychosocial history related to current functional performance  Comorbidities affecting functional mobility and ADLS include: PE, arthritis, BPH, RSV, IBS, liver disease, COPD, cancer, HTN  Prior to admission, patient was independent with functional mobility with with and without single point cane, independent with ADLS and requiring assist for IADLS  The evaluation identifies the following performance deficits: weakness, impaired balance, decreased endurance, increased fall risk, new onset of impairment of functional mobility, decreased ADLS, decreased IADLS, decreased activity tolerance, decreased safety awareness, SOB upon exertion and decreased strength, that result in activity limitations and/or participation restrictions  This evaluation requires clinical decision making of high complexity, because the patient presents with comorbidites that affect occupational performance and required significant modification of tasks or assistance with consideration of multiple treatment options  The Barthel Index was used as a functional outcome tool presenting with a score of Barthel Index Score: 55, indicating marked limitations of functional mobility and ADLS    The patient's raw score on the AM-PAC Daily Activity inpatient short form is 20, standardized score is 42 03, greater than 39 4  Patients at this level are likely to benefit from DC to home  Please refer to the recommendation of the Occupational Therapist for safe DC planning  Patient will benefit from skilled Occupational Therapy services to address above deficits and facilitate a safe return to prior level of function  Goals   Patient Goals To go home   STG Time Frame   (1-7 days)   Short Term Goal  Goals established to promote Patient Goals: "go home":  Patient will increase standing tolerance to 5 minutes during ADL task to decrease assistance level and decrease fall risk; Patient will increase bed mobility to supervision in preparation for ADLS and transfers; Patient will increase functional mobility to and from bathroom with rolling walker with supervision to increase performance with ADLS and to use a toilet; Patient will tolerate 5 minutes of UE ROM/strengthening to increase general activity tolerance and performance in ADLS/IADLS; Patient will improve functional activity tolerance to 5 minutes of sustained functional tasks to increase participation in basic self-care and decrease assistance level;  Patient will be able to to verbalize understanding and perform energy conservation/proper body mechanics during ADLS and functional mobility at least 50% of the time with moderate cueing to decrease signs of fatigue and increase stamina to return to prior level of function; Patient will increase dynamic sitting balance to fair+ to improve the ability to sit at edge of bed or on a chair for ADLS;  Patient will increase dynamic standing balance to fair to improve postural stability and decrease fall risk during standing ADLS and transfers      LTG Time Frame   (8-14 days)   Long Term Goal Patient will increase standing tolerance to 10 minutes during ADL task to decrease assistance level and decrease fall risk; Patient will increase bed mobility to independent in preparation for ADLS and transfers; Patient will increase functional mobility to and from bathroom with rolling walker independently to increase performance with ADLS and to use a toilet; Patient will tolerate 10 minutes of UE ROM/strengthening to increase general activity tolerance and performance in ADLS/IADLS; Patient will improve functional activity tolerance to 10 minutes of sustained functional tasks to increase participation in basic self-care and decrease assistance level;  Patient will be able to to verbalize understanding and perform energy conservation/proper body mechanics during ADLS and functional mobility at least 75% of the time with minimal cueing to decrease signs of fatigue and increase stamina to return to prior level of function; Patient will increase static/dynamic sitting balance to good to improve the ability to sit at edge of bed or on a chair for ADLS;  Patient will increase static/dynamic standing balance to fair+ to improve postural stability and decrease fall risk during standing ADLS and transfers  Pt will score >/= 24/24 on AM-PAC Daily Activity Inpatient scale to promote safe independence with ADLs and functional mobility; Pt will score >/= 80/100 on Barthel Index in order to decrease caregiver assistance needed and increase ability to perform ADLs and functional mobility  Functional Transfer Goals   Pt Will Perform All Functional Transfers   (STG supervision, LTG independent)   ADL Goals   Pt Will Perform Grooming   (LTG independent)   Pt Will Perform Bathing   (STG supervision, LTG independent)   Pt Will Perform UE Dressing   (STG supervision, LTG independent)   Pt Will Perform LE Dressing   (STG supervision, LTG independent)   Pt Will Perform Toileting   (STG supervision, LTG independent)   Plan   Treatment Interventions ADL retraining;Functional transfer training;UE strengthening/ROM; Endurance training;Patient/family training;Equipment evaluation/education; Compensatory technique education;Continued evaluation; Energy conservation; Activityengagement   Goal Expiration Date 05/13/22   OT Frequency 3-5x/wk   Additional Treatment Session   Start Time 1105   End Time 1115   Treatment Assessment S: "my breathing feels heavy" O: While seated EOB lower body dressing task performed: don bilateral sneakers with min assist; patient performed sit to stand from EOB with min assist; static standing to RW approx 2 mins with supervision; ambulated short household distance in room to door and back with RW and supervision; stand to sit to EOB with min assist; A: Patient with increased WOB with minimal activity; after short distance ambulation SpO2 on 3L decreased to 86%, required increased time and rest break to return to >90%;  Gait much improved with use of RW however patient still significantly limited by SOB, at end of session patient seated EOB with all needs met ; P: Home OT     Recommendation   OT Discharge Recommendation Home with home health rehabilitation   AM-PAC Daily Activity Inpatient   Lower Body Dressing 3   Bathing 3   Toileting 3   Upper Body Dressing 3   Grooming 4   Eating 4   Daily Activity Raw Score 20   Daily Activity Standardized Score (Calc for Raw Score >=11) 42 03   AM-PAC Applied Cognition Inpatient   Following a Speech/Presentation 4   Understanding Ordinary Conversation 4   Taking Medications 4   Remembering Where Things Are Placed or Put Away 4   Remembering List of 4-5 Errands 3   Taking Care of Complicated Tasks 4   Applied Cognition Raw Score 23   Applied Cognition Standardized Score 53 08   Barthel Index   Feeding 10   Bathing 0   Grooming Score 0   Dressing Score 5   Bladder Score 10   Bowels Score 10   Toilet Use Score 5   Transfers (Bed/Chair) Score 10   Mobility (Level Surface) Score 0   Stairs Score 0   Barthel Index Score 48   Licensure   [place] License Number  José Flores OTR/L [id number]

## 2022-04-29 NOTE — PHYSICAL THERAPY NOTE
PT EVALUATION     Time In: 1325  Time Out: 8786       04/29/22 1343   PT Last Visit   PT Visit Date 04/29/22   Pain Assessment   Pain Assessment Tool 0-10   Pain Score No Pain   Hospital Pain Intervention(s) Ambulation/increased activity; Emotional support;Repositioned   Restrictions/Precautions   Other Precautions O2;Fall Risk;Telemetry;Multiple lines; Chair Alarm; Bed Alarm   Home Living   Type of [facility] [address] One level;Stairs to enter without rails  (2 TEMI)   Bathroom Shower/Tub Walk-in shower   Bathroom Toilet Standard   Bathroom Equipment Shower chair   Bathroom Accessibility Accessible   Prior Function   Level of [place] Independent with ADLs and functional mobility   Lives With [place] in the last 6 months 1 to 4   General   Additional Pertinent History Pt admitted for SOB  PD and COPD in medical history    Cognition   Arousal/Participation Cooperative   Orientation Level Oriented X4   Subjective   Subjective "eh, okay"    RLE Assessment   RLE Assessment   (3+/5 all myotomes)   LLE Assessment   LLE Assessment   (3+/5 all myotomes)   Bed Mobility   Supine to Sit 5  Supervision   Sit to Supine 5  Supervision   Transfers   Sit to Stand 4  Minimal assistance   Additional items Assist x 1;HOB elevated; Increased time required   Stand to Sit 5  Supervision   Additional items Assist x 1; Increased time required   Ambulation/Elevation   Gait pattern Shuffling; Short stride; Foward flexed   Gait Assistance 4  Minimal assist   Additional items Assist x 1   Assistive Device   (PT hand hold )   Distance 4 feet L lateral walking   Stair Management Assistance Not tested   Balance   Static Sitting Fair +   Dynamic Sitting Fair   Static Standing Fair -   Dynamic Standing Poor +   Ambulatory Poor +   Endurance Deficit   Endurance Deficit Yes   Endurance Deficit Description Limited with SOB, patient's oxygen saturation not dropping below 96%, despite good O2 saturation, patient demonstrating labored breathing with increased accessory muscle use  Activity Tolerance   Activity Tolerance Patient limited by fatigue   Nurse Made Aware RN Linda   Assessment   Prognosis Good   Problem List Decreased strength;Decreased range of motion;Decreased endurance; Impaired balance;Decreased mobility   Assessment Patient seen for Physical Therapy evaluation  Patient admitted with COPD with acute exacerbation ([facility] [address] )  Comorbidities affecting patient's physical performance include: BPH, chronic diarrhea, centrilobular emphysema, anxiety, GERD, hypertension, neuropathy and Parkinsons  Personal factors affecting patient at time of initial evaluation include: stairs to enter home, inability to ambulate household distances, inability to navigate community distances, limited home support, positive fall history, inability to perform physical activity, inability to perform ADLS and inability to perform IADLS   Prior to admission, patient was independent with ADLS, independent with IADLS, living alone in single story home with 2 steps to enter, ambulating household distance, ambulating community distances and retired  Please find objective findings from Physical Therapy assessment regarding body systems outlined above with impairments and limitations including weakness, impaired balance, decreased endurance, impaired coordination, gait deviations, decreased activity tolerance, decreased functional mobility tolerance, fall risk and SOB upon exertion  The Barthel Index was used as a functional outcome tool presenting with a score of   today indicating marked limitations of functional mobility and ADLS  Patient's clinical presentation is currently unstable/unpredictable as seen in patient's presentation of vital sign response, increased fall risk, new onset of impairment of functional mobility, decreased endurance and new onset of weakness   Pt would benefit from continued Physical Therapy treatment to address deficits as defined above and maximize level of functional mobility  As demonstrated by objective findings, the assigned level of complexity for this evaluation is high  The patient's AM-PAC Basic Mobility Inpatient Short Form Raw Score is 16    A Raw score of less than or equal to 16 suggests the patient may benefit from discharge to post-acute rehabilitation services  Please also refer to the recommendation of the Physical Therapist for safe discharge planning  Barriers to Discharge Decreased caregiver support   Goals   Patient Goals "go home"   STG Expiration Date 05/06/22   Short Term Goal #1 1)  Pt will perform bed mobility with mod I demonstrating appropriate technique  in order to improve function  2)  Perform all transfers with mod I demonstrating safe and appropriate technique in order to improve ability to negotiate safely in home environment  3) Amb with least restrictive AD > 50'x1 with mod I in order to demonstrate ability to negotiate in home environment  LTG Expiration Date 05/13/22   Long Term Goal #1 1)  Improve overall strength and balance 1/2 grade in order to optimize ability to perform functional tasks and reduce fall risk  2) Increase activity tolerance to 45 minutes in order to improve endurance to functional tasks  3)  Negotiate stairs using most appropriate technique and S in order to be able to negotiate safely in home environment  4) PT for ongoing patient and family/caregiver education, DME needs and d/c planning in order to promote highest level of function in least restrictive environment  Plan   Treatment/Interventions ADL retraining;Functional transfer training;LE strengthening/ROM; Therapeutic exercise; Endurance training;Patient/family training;Bed mobility;Gait training;OT;Spoke to nursing   PT Frequency   (5x/wk)   Recommendation   PT Discharge Recommendation Post acute rehabilitation services   Additional Comments Pending patient's progress in hospital, may be able to be discharged to home with HHPT  With current labored breathing and decreased endurance, PT recommending post-acute rehab services  AM-PAC Basic Mobility Inpatient   Turning in Bed Without Bedrails 3   Lying on Back to Sitting on Edge of Flat Bed 3   Moving Bed to Chair 3   Standing Up From Chair 3   Walk in Room 2   Climb 3-5 Stairs 2   Basic Mobility Inpatient Raw Score 16   Basic Mobility Standardized Score 38 32   Highest Level Of Mobility   [facility] Goal 5: Stand one or more mins   [facility] Highest Level of Mobility 5: Stand (1 or more minutes)   -[facility] Goal Achieved Yes   Barthel Index   Feeding 10   Bathing 5   Grooming Score 0   Dressing Score 5   Bladder Score 10   Bowels Score 10   Toilet Use Score 5   Transfers (Bed/Chair) Score 10   Mobility (Level Surface) Score 0   Stairs Score 0   Barthel Index Score 55   End of Consult   Patient Position at End of Consult Supine; All needs within reach   [address] Number  Kirk Milad PT, DPT [id number]

## 2022-04-30 LAB
ALBUMIN SERPL BCP-MCNC: 3.4 G/DL (ref 3.5–5)
ALP SERPL-CCNC: 73 U/L (ref 46–116)
ALT SERPL W P-5'-P-CCNC: 37 U/L (ref 12–78)
ANION GAP SERPL CALCULATED.3IONS-SCNC: 8 MMOL/L (ref 4–13)
AST SERPL W P-5'-P-CCNC: 16 U/L (ref 5–45)
BILIRUB SERPL-MCNC: 0.79 MG/DL (ref 0.2–1)
BUN SERPL-MCNC: 23 MG/DL (ref 5–25)
CALCIUM ALBUM COR SERPL-MCNC: 9.4 MG/DL (ref 8.3–10.1)
CALCIUM SERPL-MCNC: 8.9 MG/DL (ref 8.3–10.1)
CHLORIDE SERPL-SCNC: 104 MMOL/L (ref 100–108)
CO2 SERPL-SCNC: 26 MMOL/L (ref 21–32)
CREAT SERPL-MCNC: 1.08 MG/DL (ref 0.6–1.3)
ERYTHROCYTE [DISTWIDTH] IN BLOOD BY AUTOMATED COUNT: 13.6 % (ref 11.6–15.1)
GFR SERPL CREATININE-BSD FRML MDRD: 65 ML/MIN/1.73SQ M
GLUCOSE SERPL-MCNC: 161 MG/DL (ref 65–140)
HCT VFR BLD AUTO: 42.8 % (ref 36.5–49.3)
HGB BLD-MCNC: 14.4 G/DL (ref 12–17)
MCH RBC QN AUTO: 31.6 PG (ref 26.8–34.3)
MCHC RBC AUTO-ENTMCNC: 33.6 G/DL (ref 31.4–37.4)
MCV RBC AUTO: 94 FL (ref 82–98)
PLATELET # BLD AUTO: 145 THOUSANDS/UL (ref 149–390)
PMV BLD AUTO: 8.7 FL (ref 8.9–12.7)
POTASSIUM SERPL-SCNC: 4.4 MMOL/L (ref 3.5–5.3)
PROT SERPL-MCNC: 6.4 G/DL (ref 6.4–8.2)
RBC # BLD AUTO: 4.55 MILLION/UL (ref 3.88–5.62)
SODIUM SERPL-SCNC: 138 MMOL/L (ref 136–145)
WBC # BLD AUTO: 7.87 THOUSAND/UL (ref 4.31–10.16)

## 2022-04-30 PROCEDURE — 99232 SBSQ HOSP IP/OBS MODERATE 35: CPT | Performed by: PHYSICIAN ASSISTANT

## 2022-04-30 PROCEDURE — 85025 COMPLETE CBC W/AUTO DIFF WBC: CPT | Performed by: STUDENT IN AN ORGANIZED HEALTH CARE EDUCATION/TRAINING PROGRAM

## 2022-04-30 PROCEDURE — 80053 COMPREHEN METABOLIC PANEL: CPT | Performed by: STUDENT IN AN ORGANIZED HEALTH CARE EDUCATION/TRAINING PROGRAM

## 2022-04-30 PROCEDURE — 94762 N-INVAS EAR/PLS OXIMTRY CONT: CPT

## 2022-04-30 PROCEDURE — 94760 N-INVAS EAR/PLS OXIMETRY 1: CPT

## 2022-04-30 PROCEDURE — 94640 AIRWAY INHALATION TREATMENT: CPT

## 2022-04-30 PROCEDURE — 99232 SBSQ HOSP IP/OBS MODERATE 35: CPT | Performed by: STUDENT IN AN ORGANIZED HEALTH CARE EDUCATION/TRAINING PROGRAM

## 2022-04-30 RX ADMIN — TAMSULOSIN HYDROCHLORIDE 0.4 MG: 0.4 CAPSULE ORAL at 08:53

## 2022-04-30 RX ADMIN — CARBIDOPA AND LEVODOPA 1 TABLET: 25; 100 TABLET ORAL at 12:08

## 2022-04-30 RX ADMIN — METHYLPREDNISOLONE SODIUM SUCCINATE 40 MG: 40 INJECTION, POWDER, FOR SOLUTION INTRAMUSCULAR; INTRAVENOUS at 21:13

## 2022-04-30 RX ADMIN — FORMOTEROL FUMARATE DIHYDRATE 20 MCG: 20 SOLUTION RESPIRATORY (INHALATION) at 20:48

## 2022-04-30 RX ADMIN — CHOLESTYRAMINE 4 G: 4 POWDER, FOR SUSPENSION ORAL at 08:52

## 2022-04-30 RX ADMIN — CARBIDOPA AND LEVODOPA 1 TABLET: 25; 100 TABLET ORAL at 15:59

## 2022-04-30 RX ADMIN — BUSPIRONE HYDROCHLORIDE 10 MG: 10 TABLET ORAL at 08:53

## 2022-04-30 RX ADMIN — GABAPENTIN 300 MG: 300 CAPSULE ORAL at 08:53

## 2022-04-30 RX ADMIN — PANTOPRAZOLE SODIUM 40 MG: 20 TABLET, DELAYED RELEASE ORAL at 17:10

## 2022-04-30 RX ADMIN — FORMOTEROL FUMARATE DIHYDRATE 20 MCG: 20 SOLUTION RESPIRATORY (INHALATION) at 07:19

## 2022-04-30 RX ADMIN — FLUTICASONE PROPIONATE 2 SPRAY: 50 SPRAY, METERED NASAL at 08:53

## 2022-04-30 RX ADMIN — MIRTAZAPINE 15 MG: 15 TABLET, FILM COATED ORAL at 21:13

## 2022-04-30 RX ADMIN — METHYLPREDNISOLONE SODIUM SUCCINATE 40 MG: 40 INJECTION, POWDER, FOR SOLUTION INTRAMUSCULAR; INTRAVENOUS at 06:07

## 2022-04-30 RX ADMIN — GABAPENTIN 300 MG: 300 CAPSULE ORAL at 21:13

## 2022-04-30 RX ADMIN — IPRATROPIUM BROMIDE AND ALBUTEROL SULFATE 3 ML: 2.5; .5 SOLUTION RESPIRATORY (INHALATION) at 20:41

## 2022-04-30 RX ADMIN — BUDESONIDE 0.5 MG: 0.5 INHALANT ORAL at 20:41

## 2022-04-30 RX ADMIN — IPRATROPIUM BROMIDE AND ALBUTEROL SULFATE 3 ML: 2.5; .5 SOLUTION RESPIRATORY (INHALATION) at 07:17

## 2022-04-30 RX ADMIN — AMLODIPINE BESYLATE 10 MG: 10 TABLET ORAL at 08:53

## 2022-04-30 RX ADMIN — IPRATROPIUM BROMIDE AND ALBUTEROL SULFATE 3 ML: 2.5; .5 SOLUTION RESPIRATORY (INHALATION) at 15:02

## 2022-04-30 RX ADMIN — PANTOPRAZOLE SODIUM 40 MG: 20 TABLET, DELAYED RELEASE ORAL at 08:53

## 2022-04-30 RX ADMIN — BUDESONIDE 0.5 MG: 0.5 INHALANT ORAL at 07:17

## 2022-04-30 RX ADMIN — BUSPIRONE HYDROCHLORIDE 10 MG: 10 TABLET ORAL at 21:13

## 2022-04-30 RX ADMIN — CARBIDOPA AND LEVODOPA 1 TABLET: 25; 100 TABLET ORAL at 08:53

## 2022-04-30 RX ADMIN — BUSPIRONE HYDROCHLORIDE 10 MG: 10 TABLET ORAL at 15:59

## 2022-04-30 RX ADMIN — ENOXAPARIN SODIUM 40 MG: 40 INJECTION SUBCUTANEOUS at 08:53

## 2022-04-30 RX ADMIN — GABAPENTIN 300 MG: 300 CAPSULE ORAL at 15:59

## 2022-04-30 RX ADMIN — IPRATROPIUM BROMIDE AND ALBUTEROL SULFATE 3 ML: 2.5; .5 SOLUTION RESPIRATORY (INHALATION) at 11:02

## 2022-04-30 RX ADMIN — FINASTERIDE 5 MG: 5 TABLET, FILM COATED ORAL at 08:53

## 2022-04-30 RX ADMIN — METHYLPREDNISOLONE SODIUM SUCCINATE 40 MG: 40 INJECTION, POWDER, FOR SOLUTION INTRAMUSCULAR; INTRAVENOUS at 14:29

## 2022-04-30 NOTE — PLAN OF CARE
Problem: Nutrition/Hydration-ADULT  Goal: Nutrient/Hydration intake appropriate for improving, restoring or maintaining nutritional needs  Description: Monitor and assess patient's nutrition/hydration status for malnutrition  Collaborate with interdisciplinary team and initiate plan and interventions as ordered  Monitor patient's weight and dietary intake as ordered or per policy  Utilize nutrition screening tool and intervene as necessary  Determine patient's food preferences and provide high-protein, high-caloric foods as appropriate       INTERVENTIONS:  - Monitor oral intake, urinary output, labs, and treatment plans  - Assess nutrition and hydration status and recommend course of action  - Evaluate amount of meals eaten  - Assist patient with eating if necessary   - Allow adequate time for meals  - Recommend/ encourage appropriate diets, oral nutritional supplements, and vitamin/mineral supplements  - Order, calculate, and assess calorie counts as needed  - Recommend, monitor, and adjust tube feedings and TPN/PPN based on assessed needs  - Assess need for intravenous fluids  - Provide specific nutrition/hydration education as appropriate  - Include patient/family/caregiver in decisions related to nutrition  Outcome: Progressing     Problem: RESPIRATORY - ADULT  Goal: Achieves optimal ventilation and oxygenation  Description: INTERVENTIONS:  - Assess for changes in respiratory status  - Assess for changes in mentation and behavior  - Position to facilitate oxygenation and minimize respiratory effort  - Oxygen administered by appropriate delivery if ordered  - Initiate smoking cessation education as indicated  - Encourage broncho-pulmonary hygiene including cough, deep breathe, Incentive Spirometry  - Assess the need for suctioning and aspirate as needed  - Assess and instruct to report SOB or any respiratory difficulty  - Respiratory Therapy support as indicated  Outcome: Progressing     Problem: MOBILITY - ADULT  Goal: Maintain or return to baseline ADL function  Description: INTERVENTIONS:  -  Assess patient's ability to carry out ADLs; assess patient's baseline for ADL function and identify physical deficits which impact ability to perform ADLs (bathing, care of mouth/teeth, toileting, grooming, dressing, etc )  - Assess/evaluate cause of self-care deficits   - Assess range of motion  - Assess patient's mobility; develop plan if impaired  - Assess patient's need for assistive devices and provide as appropriate  - Encourage maximum independence but intervene and supervise when necessary  - Involve family in performance of ADLs  - Assess for home care needs following discharge   - Consider OT consult to assist with ADL evaluation and planning for discharge  - Provide patient education as appropriate  Outcome: Progressing  Goal: Maintains/Returns to pre admission functional level  Description: INTERVENTIONS:  - Perform BMAT or MOVE assessment daily    - Set and communicate daily mobility goal to care team and patient/family/caregiver  - Collaborate with rehabilitation services on mobility goals if consulted  - Perform Range of Motion 3 times a day  - Reposition patient every 2 hours    - Dangle patient 3 times a day  - Stand patient 3 times a day  - Ambulate patient 3 times a day  - Out of bed to chair 3 times a day   - Out of bed for meals 3 times a day  - Out of bed for toileting  - Record patient progress and toleration of activity level   Outcome: Progressing     Problem: Prexisting or High Potential for Compromised Skin Integrity  Goal: Skin integrity is maintained or improved  Description: INTERVENTIONS:  - Identify patients at risk for skin breakdown  - Assess and monitor skin integrity  - Assess and monitor nutrition and hydration status  - Monitor labs   - Assess for incontinence   - Turn and reposition patient  - Assist with mobility/ambulation  - Relieve pressure over bony prominences  - Avoid friction and shearing  - Provide appropriate hygiene as needed including keeping skin clean and dry  - Evaluate need for skin moisturizer/barrier cream  - Collaborate with interdisciplinary team   - Patient/family teaching  - Consider wound care consult   Outcome: Progressing

## 2022-04-30 NOTE — PROGRESS NOTES
[address] [facility] 64 [address] 81 [facility] Practice Progress Note   Olivia Urbina 65 y o  male   MRN: [id number]    Unit/Bed#: 3 [place] 327-02 Encounter: [id number]    SUMMARY: 65 y o  male patient admitted for possible COPD exacerbation  Currently on 3L: O2 per NC  On steroid trial  Complaints of worsening SOB with exertion and standing up  Continue steroids and oxygen  F: None   E: No repletion needed  N: Diet Cardiovascular; Sodium 4 Gm (JEEVAN)   D: Enoxaparin (LOVENOX) subcutaneous injection 40 mg, Daily   A: None    Disposition: Continue Med - Surg  Code Status: Level 1 - Full Code    ASSESSMENT/PLAN:     * [facility]  Assessment & Plan  Patient with a history of AAT deficiency and centrolobular emphysema presenting with SOB of 2-3 days  On home O2 2L and reports that he has had to increase O2 to 5L  125mg solumedrol given in ED   Trop neg    CTA: No evidence of acute PE  Pulmonary emphysema and parenchymal scarring without evidence of acute infiltrate   Trace bilateral effusions   CXR:  No acute cardiopulmonary disease    · Solu-Medrol 40 mg, Q8H - Steroid trial   · Continuing home DuoNebs 3 mL 4 X daily, fluticasone, budesonide 0 5 mg BID, albuterol Q4 hrs PRN  · Continuous pulse oximetry, spirometry  · O2 supplementation, tapering as needed - On 3L NC Spo2 98%  · Pulmonology consulted - unclear if exacerbation or worsening baseline - steroid taper - may require chronic low dose steroid therapy at home   · Patient receives weekly Zemaira injections, will order when indicated if patient remains in hospital    Alpha-1-antitrypsin deficiency [facility])  Assessment & Plan  · On Zemaira 1x week  · Will order when indicated if patient remains in hospital    Acute and chronic respiratory failure with hypoxia ([facility] )  Assessment & Plan  Acute on chronic, improving    Acute and chronic respiratory failure with hypoxia due to COPD exacerbation, as evidenced by respiratory distress, accessory muscle use, tachypnea on admission  Treated with BiPaP in ED, currently oxygenating well on 4L NC  · See plan under COPD exacerbation    Centrilobular emphysema [facility])  Assessment & Plan  Patient seen by pulmonology outpatient day prior to admission for respiratory distress for several days  Was given Solu-Medrol yesterday with a 5 day course of steroid taper  Presented to the ED with ongoing progressively worsening shortness of breath and accessory muscle use  He has history of emphysema, alpha-1 antitrypsin deficiency  · See under COPD exacerbation    Hypertensive urgency  Assessment & Plan        Stable    /76    · Continue Norvasc 10 mg PO daily  · Home midodrine held in the setting of uncontrolled BP  · Continue to monitor vital signs  · Will optimize medications as needed    Parkinson's disease [facility])  Assessment & Plan     Followed and managed by [facility] Neurology office       · Continue carbidopa levodopa  mg p o  TID    Essential hypertension  Assessment & Plan    /76 mmHg today     · Continue Norvasc 10 mg POD  · Home midodrine held in setting of hypertensive urgency, will restart when appropriate    Anxiousness  Assessment & Plan  Stable,  · Continue BuSpar 10 mg PO TID  · Lorazepam - Held     Depression, recurrent (HCC)  Assessment & Plan  Stable  · Continue mirtazapine 15 mg PO HS    Orthostatic hypotension with spine hypertension  Assessment & Plan    BP on arrival 223/98    · Continue hypertensive medications  · Home midodrine held in the setting of hypertensive emergency    Peripheral neuropathy  Assessment & Plan  Stable  · Continue gabapentin    Chronic diarrhea   Assessment & Plan  Stable  · Continue cholestyramine    Ambulatory dysfunction  Assessment & Plan  Chronic  · Fall precautions  · PT/OT    Insomnia  Assessment & Plan  Stable  · Continue home  mirtazapine     BPH (benign prostatic hyperplasia)  Assessment & Plan  Stable  · Continue Flomax and finasteride    Gastroesophageal reflux disease  Assessment & Plan  Stable  · Continue home Protonix 40 mg b i d     -------------------------------------------------------------------------------------  HPI - 24HR EVENTS    There were no overnight concerning events or episodes of fever  Patient's vital signs were stable throughout the night  No complaints  's - 160's at night    ---------------------------------------------------------------------------------------  SUBJECTIVE    Went to round on patient throughout the morning  He was encountered sitting in bed trying to eat his breakfast  Was AAO x3 and in NAD  He mentioned that he was a little better  Also that he had more SOB when he stands up and when he walks  Review of Systems    Patient denied not fever, chills or other concerning events during the night  Had no chest pain, N/V, diarrhea, abdominal pain, or any other related symptoms  Has been tolerating diet  Has been urinating and defecating adequately  No other complaints    ---------------------------------------------------------------------------------------  OBJECTIVE    Vitals     Vitals:    22 0853 22 1102 22 1502 22 1515   BP: 132/76   168/81   BP Location: Left arm   Left arm   Pulse: 88   74   Resp: 20   21   Temp:    98 9 °F (37 2 °C)   TempSrc:    Oral   SpO2:  98% 98% 98%   Weight:       Height:         Temp (24hrs), Av °F (36 7 °C), Min:97 6 °F (36 4 °C), Max:98 9 °F (37 2 °C)  Current: Temperature: 98 9 °F (37 2 °C)    Respiratory:    SpO2: 98 %  SpO2 Activity: At Rest  O2 Device: Nasal cannula    Nasal Cannula O2 Flow Rate (L/min): 3 L/min    PHYSICAL EXAM:    Physical Exam  Constitutional:       General: He is not in acute distress  Appearance: He is normal weight  He is not ill-appearing, toxic-appearing or diaphoretic  HENT:      Head: Normocephalic and atraumatic  Cardiovascular:      Rate and Rhythm: Normal rate and regular rhythm     Pulmonary:      Breath sounds: Decreased breath sounds present  Abdominal:      General: Bowel sounds are normal       Palpations: Abdomen is soft  Musculoskeletal:      Cervical back: Normal range of motion  Skin:     General: Skin is warm and dry  Neurological:      Mental Status: He is alert and oriented to person, place, and time  Comments: Tremorous   Psychiatric:         Mood and Affect: Mood normal          Behavior: Behavior normal      Laboratory and Diagnostics:    Results from last 7 days   Lab Units 04/30/22  0600 04/29/22  0624 04/28/22  1122   WBC Thousand/uL 7 87 11 57* 6 32   HEMOGLOBIN g/dL 14 4 14 3 14 7   HEMATOCRIT % 42 8 41 6 44 1   PLATELETS Thousands/uL 145* 132* 118*   NEUTROS PCT %  --  79* 46   MONOS PCT %  --  5 6     Results from last 7 days   Lab Units 04/30/22  0600 04/29/22  0512 04/28/22  1122   SODIUM mmol/L 138 139 141   POTASSIUM mmol/L 4 4 4 4 4 2   CHLORIDE mmol/L 104 103 103   CO2 mmol/L 26 27 30   ANION GAP mmol/L 8 9 8   BUN mg/dL 23 23 21   CREATININE mg/dL 1 08 1 07 1 23   CALCIUM mg/dL 8 9 9 3 9 0   GLUCOSE RANDOM mg/dL 161* 127 115   ALT U/L 37  --  45   AST U/L 16  --  25   ALK PHOS U/L 73  --  76   ALBUMIN g/dL 3 4*  --  3 9   TOTAL BILIRUBIN mg/dL 0 79  --  1 21*     Results from last 7 days   Lab Units 04/28/22  1122   MAGNESIUM mg/dL 2 0      Results from last 7 days   Lab Units 04/28/22  1122   INR  1 10   PTT seconds 29              ABG:  Results from last 7 days   Lab Units 04/28/22  1146   PH ART  7 430   PCO2 ART mm Hg 41 4   PO2 ART mm Hg 171 2*   HCO3 ART mmol/L 26 9   BASE EXC ART mmol/L 2 3   ABG SOURCE  Radial, Right     VBG:  Results from last 7 days   Lab Units 04/28/22  1146   ABG SOURCE  Radial, Right     Imaging: I have personally reviewed pertinent reports  CTA ED chest PE Study   Final Result      1  No evidence of acute pulmonary embolism  2  Pulmonary emphysema and parenchymal scarring without evidence of acute infiltrate   3   Trace bilateral effusions   XR chest 1 view portable   Final Result      No acute cardiopulmonary disease  Intake and Output    I/O       04/28 0701 04/29 0700 04/29 0701 04/30 0700 04/30 0701 05/01 0700    Urine (mL/kg/hr) 500 250 (0 1)     Total Output 500 250     Net -500 -250                Height and Weights     Height: 6' 5" (195 6 cm)  IBW (Ideal Body Weight): 89 1 kg  Body mass index is 21 04 kg/m²  Weight (last 2 days)     Date/Time Weight    04/28/22 1453 80 5 (177 47)        Nutrition         Diet Orders   (From admission, onward)             Start     Ordered    04/29/22 1336  Diet Cardiovascular; Sodium 4 Gm (JEEVAN)  Diet effective now        References:    Nutrtion Support Algorithm Enteral vs  Parenteral   Question Answer Comment   Diet Type Cardiovascular    Cardiac Sodium 4 Gm (JEEVAN)    RD to adjust diet per protocol?  Yes        04/29/22 1336    04/29/22 1018  Room Service  Once        Question:  Type of Service  Answer:  Room Service - Appropriate with Assistance    04/29/22 1017              Active Medications    Scheduled Meds:    Current Facility-Administered Medications   Medication Dose Route Frequency Provider Last Rate    acetaminophen  650 mg Oral Q6H PRN Fred Anderson MD      albuterol  2 puff Inhalation Q4H PRN Fred Anderson MD      amLODIPine  10 mg Oral Daily Fred Anderson MD      budesonide  0 5 mg Nebulization BID Fred Anderson MD      busPIRone  10 mg Oral TID Fred Anderson MD      carbidopa-levodopa  1 tablet Oral TID With Meals Fred Anderson MD      cholestyramine sugar free  4 g Oral Daily Fred Anderson MD      enoxaparin  40 mg Subcutaneous Daily Fred Anderson MD      finasteride  5 mg Oral QAM Fred Anderson MD      fluticasone  2 spray Nasal Daily Fred Anderson MD      formoterol  20 mcg Nebulization Q12H Sharyle Lora, PA-C      gabapentin  300 mg Oral TID Fred Anderson MD      ipratropium-albuterol 3 mL Nebulization 4x Daily Zana Wagner MD      methylPREDNISolone sodium succinate  40 mg Intravenous [facility] Parag Canalesmipenny Mahoney      mirtazapine  15 mg Oral HS Zana Wagner MD      pantoprazole  40 mg Oral BID Zana Wagner MD      tamsulosin  0 4 mg Oral Daily Zana Wagner MD     PRN Meds:     acetaminophen, 650 mg, Q6H PRN  albuterol, 2 puff, Q4H PRN    Invasive Devices Review    Invasive Devices  Report    Peripheral Intravenous Line            Peripheral IV 04/28/22 Right Antecubital 2 days              It was a pleasure to be of service of [employer]  Liza Mccracken MD, MSMS  [address]  PGY1 - [facility]      Portions of the record may have been created with voice recognition software  Occasional wrong word or "sound a like" substitutions may have occurred due to the inherent limitations of voice recognition software  Read the chart carefully and recognize, using context, where substitutions have occurred

## 2022-04-30 NOTE — PROGRESS NOTES
Progress Note - Pulmonary Medicine  Brodie Smallwood 65 y o  male MRN: [id number]  Unit/Bed#: [address] [place] [address] Encounter: [id number]  Code Status: Level 1 - Full Code    Assessment/Plan:    Chiquita Reyes is a 65 y o  Past Medical History:   Diagnosis Date    Anesthesia complication     Difficult to wake up    Arthritis     BPH (benign prostatic hyperplasia)     urinary frequency    Cancer (HCC)     basal cell neck, face    COPD (chronic obstructive pulmonary disease) (HCC)     [facility] ) 12/29/2019    Full dentures     Hiatal hernia     History of methicillin resistant staphylococcus aureus (MRSA)     10/11/2018 MRSA (nares) positive    Hypertension     Irritable bowel syndrome     Kidney stone     at least 7 episodes    Liver disease     Alpha 1- enzyme deficiency - diagnosed 2002   has been on weekly replacement therapy since then    [facility]     1/25/15  FEV1 - 2 45 liters or 59% of predicted    RSV infection 12/2017    Wears glasses     for driving only       Assessment/Plan:     Dyspnea:  -possible worsening baseline but is without worsening hypoxemia  -not overtly in COPD exacerbation although maybe mild underlying exacerbation on chronically debilitated status  -continue steroid trial and if non improved recommendation for cardiology consultation  -CTA PE on 04/28 without any gross CT abnormality   Chronic hypoxemic respiratory failure:  -patient is typically on 3 L NC  -has chronic dyspnea on exertion with recent worsening   -stable at 97% on 3 L  Severe emphysematous COPD with Possible Acute Exacerbation:  -FEV 1 40% as of Jan 2020  -unclear if this is exacerbation or generally worsening baseline  -has recently been on steroid taper in OP setting  -trial for improvement w/ methylprednisolone and may require chronic low dose steroid therapy   Alpha-1 antitrypsin deficiency:  -is on weekly Zemaira injections     ______________________________________________________________________    Subjective / 24 hour events:     No acute events overnight  No significant improvement in breathing although has been on steroids less than 24 hours  Does state that he felt he had some wheezing this morning and as well is still having significant exertional dyspnea  Pertinent labs Reviewed  Pertinent imaging Reviewed:  CTA PE study  negative for PE    Collaborative Discussion: d/w CFP team   Tele Events:     Vitals:   Vitals:    22 2235 22 0719 22 0728 22 0853   BP: 167/79 (!) 180/92  132/76   BP Location: Left arm Left arm  Left arm   Pulse: 74 75  88   Resp: 19 18  20   Temp: 97 9 °F (36 6 °C) 97 6 °F (36 4 °C)     TempSrc: Oral Oral     SpO2: 97% 97% 98%    Weight:       Height:         Weight (last 2 days)     Date/Time Weight    22 1453 80 5 (177 47)        Temp (24hrs), Av 8 °F (36 6 °C), Min:97 6 °F (36 4 °C), Max:98 °F (36 7 °C)  Current: Temperature: 97 6 °F (36 4 °C)          IV Infusions:       Nutrition:        Diet Orders   (From admission, onward)             Start     Ordered    22 1336  Diet Cardiovascular; Sodium 4 Gm (JEEVAN)  Diet effective now        References:    Nutrtion Support Algorithm Enteral vs  Parenteral   Question Answer Comment   Diet Type Cardiovascular    Cardiac Sodium 4 Gm (JEEVAN)    RD to adjust diet per protocol?  Yes        22 1336    22 1018  Room Service  Once        Question:  Type of Service  Answer:  Room Service - Appropriate with Assistance    22 1017                Ins/Outs:   I/O        07 07 0701   0700  07 0700    Urine (mL/kg/hr) 500 250 (0 1)     Total Output 500 250     Net -500 -250                  Lines/Drains:  Invasive Devices  Report    Peripheral Intravenous Line            Peripheral IV 22 Right Antecubital 1 day                 Active medications:  Scheduled Meds:  Current Facility-Administered Medications   Medication Dose Route Frequency Provider Last Rate    acetaminophen  650 mg Oral Q6H PRN Leisa Denise MD      albuterol  2 puff Inhalation Q4H PRN Leisa Denise MD      amLODIPine  10 mg Oral Daily Leisa Denise MD      budesonide  0 5 mg Nebulization BID Leisa Denise MD      busPIRone  10 mg Oral TID Leisa Denise MD      carbidopa-levodopa  1 tablet Oral TID With Meals Leisa Denise MD      cholestyramine sugar free  4 g Oral Daily Leisa Denise MD      enoxaparin  40 mg Subcutaneous Daily Leisa Denise MD      finasteride  5 mg Oral QAM Leisa Denise MD      fluticasone  2 spray Nasal Daily Leisa Denise MD      formoterol  20 mcg Nebulization Q12H Karson Servin PA-C      gabapentin  300 mg Oral TID Leisa Denise MD      ipratropium-albuterol  3 mL Nebulization 4x Daily Leisa Denise MD      methylPREDNISolone sodium succinate  40 mg Intravenous [facility] Karson Servin PA-C      mirtazapine  15 mg Oral HS Leisa Denise MD      pantoprazole  40 mg Oral BID Leisa Denise MD      tamsulosin  0 4 mg Oral Daily Leisa Denise MD       PRN Meds:acetaminophen, 650 mg, Q6H PRN  albuterol, 2 puff, Q4H PRN      ____________________________________________________________________    Physical Exam  Constitutional:       Appearance: He is well-developed  HENT:      Head: Normocephalic and atraumatic  Eyes:      Conjunctiva/sclera: Conjunctivae normal       Pupils: Pupils are equal, round, and reactive to light  Cardiovascular:      Rate and Rhythm: Normal rate and regular rhythm  Heart sounds: Normal heart sounds  No murmur heard  No friction rub  No gallop  Pulmonary:      Effort: Pulmonary effort is normal  Tachypnea present  No respiratory distress  Breath sounds: Normal breath sounds  No wheezing or rales  Comments: Mild tachypnea    97% on 3 L  Chest:      Chest wall: No tenderness  Abdominal:      General: Bowel sounds are normal       Palpations: Abdomen is soft  Musculoskeletal:         General: Normal range of motion  Cervical back: Normal range of motion and neck supple  Right lower leg: No edema  Left lower leg: No edema  Skin:     General: Skin is warm and dry  Neurological:      Mental Status: He is alert and oriented to person, place, and time         ____________________________________________________________________    Invasive/non-invasive ventilation settings   Respiratory  Report   Lab Data (Last 4 hours)    None         O2/Vent Data (Last 4 hours)    None                Laboratory and Diagnostics:  Results from last 7 days   Lab Units 04/30/22  0600 04/29/22  0624 04/28/22  1122   WBC Thousand/uL 7 87 11 57* 6 32   HEMOGLOBIN g/dL 14 4 14 3 14 7   HEMATOCRIT % 42 8 41 6 44 1   PLATELETS Thousands/uL 145* 132* 118*   NEUTROS PCT %  --  79* 46   MONOS PCT %  --  5 6     Results from last 7 days   Lab Units 04/30/22  0600 04/29/22  0512 04/28/22  1122   SODIUM mmol/L 138 139 141   POTASSIUM mmol/L 4 4 4 4 4 2   CHLORIDE mmol/L 104 103 103   CO2 mmol/L 26 27 30   ANION GAP mmol/L 8 9 8   BUN mg/dL 23 23 21   CREATININE mg/dL 1 08 1 07 1 23   CALCIUM mg/dL 8 9 9 3 9 0   GLUCOSE RANDOM mg/dL 161* 127 115   ALT U/L 37  --  45   AST U/L 16  --  25   ALK PHOS U/L 73  --  76   ALBUMIN g/dL 3 4*  --  3 9   TOTAL BILIRUBIN mg/dL 0 79  --  1 21*     Results from last 7 days   Lab Units 04/28/22  1122   MAGNESIUM mg/dL 2 0      Results from last 7 days   Lab Units 04/28/22  1122   INR  1 10   PTT seconds 29              ABG:  Results from last 7 days   Lab Units 04/28/22  1146   PH ART  7 430   PCO2 ART mm Hg 41 4   PO2 ART mm Hg 171 2*   HCO3 ART mmol/L 26 9   BASE EXC ART mmol/L 2 3   ABG SOURCE  Radial, Right     VBG:  Results from last 7 days   Lab Units 04/28/22  1146   ABG SOURCE Radial, Right           Micro        Imaging:   CTA ED chest PE Study   Final Result by Katelin Terrell MD (04/28 0405)      1  No evidence of acute pulmonary embolism  2  Pulmonary emphysema and parenchymal scarring without evidence of acute infiltrate   3  Trace bilateral effusions                     Workstation performed: [id number]         XR chest 1 view portable   Final Result by Shannon Kuhn MD (04/28 3347)      No acute cardiopulmonary disease  Workstation performed: [id number]             Micro:   Lab Results   Component Value Date    BLOODCX No Growth After 5 Days  08/08/2021    BLOODCX No Growth After 5 Days  08/08/2021    BLOODCX No Growth After 5 Days   12/29/2017    WOUNDCULT 3+ Growth of Staphylococcus aureus (A) 06/12/2019    MRSACULTURE  11/11/2019     No Methicillin Resistant Staphlyococcus aureus (MRSA) isolated            Invalid input(s): Dyan Rose

## 2022-05-01 PROBLEM — R07.89 CHEST HEAVINESS: Status: ACTIVE | Noted: 2022-05-01

## 2022-05-01 LAB
2HR DELTA HS TROPONIN: 0 NG/L
ANION GAP SERPL CALCULATED.3IONS-SCNC: 9 MMOL/L (ref 4–13)
ATRIAL RATE: 71 BPM
ATRIAL RATE: 80 BPM
BASOPHILS # BLD AUTO: 0.01 THOUSANDS/ΜL (ref 0–0.1)
BASOPHILS NFR BLD AUTO: 0 % (ref 0–1)
BUN SERPL-MCNC: 27 MG/DL (ref 5–25)
CALCIUM SERPL-MCNC: 8.7 MG/DL (ref 8.3–10.1)
CARDIAC TROPONIN I PNL SERPL HS: 11 NG/L
CARDIAC TROPONIN I PNL SERPL HS: 11 NG/L
CHLORIDE SERPL-SCNC: 105 MMOL/L (ref 100–108)
CO2 SERPL-SCNC: 24 MMOL/L (ref 21–32)
CREAT SERPL-MCNC: 1.04 MG/DL (ref 0.6–1.3)
EOSINOPHIL # BLD AUTO: 0 THOUSAND/ΜL (ref 0–0.61)
EOSINOPHIL NFR BLD AUTO: 0 % (ref 0–6)
ERYTHROCYTE [DISTWIDTH] IN BLOOD BY AUTOMATED COUNT: 13.6 % (ref 11.6–15.1)
GFR SERPL CREATININE-BSD FRML MDRD: 68 ML/MIN/1.73SQ M
GLUCOSE SERPL-MCNC: 159 MG/DL (ref 65–140)
HCT VFR BLD AUTO: 41.1 % (ref 36.5–49.3)
HGB BLD-MCNC: 13.7 G/DL (ref 12–17)
IMM GRANULOCYTES # BLD AUTO: 0.21 THOUSAND/UL (ref 0–0.2)
IMM GRANULOCYTES NFR BLD AUTO: 2 % (ref 0–2)
LYMPHOCYTES # BLD AUTO: 1.21 THOUSANDS/ΜL (ref 0.6–4.47)
LYMPHOCYTES NFR BLD AUTO: 10 % (ref 14–44)
MCH RBC QN AUTO: 31.5 PG (ref 26.8–34.3)
MCHC RBC AUTO-ENTMCNC: 33.3 G/DL (ref 31.4–37.4)
MCV RBC AUTO: 95 FL (ref 82–98)
MONOCYTES # BLD AUTO: 0.49 THOUSAND/ΜL (ref 0.17–1.22)
MONOCYTES NFR BLD AUTO: 4 % (ref 4–12)
NEUTROPHILS # BLD AUTO: 9.96 THOUSANDS/ΜL (ref 1.85–7.62)
NEUTS SEG NFR BLD AUTO: 84 % (ref 43–75)
NRBC BLD AUTO-RTO: 0 /100 WBCS
P AXIS: -27 DEGREES
PLATELET # BLD AUTO: 136 THOUSANDS/UL (ref 149–390)
PMV BLD AUTO: 9.3 FL (ref 8.9–12.7)
POTASSIUM SERPL-SCNC: 4.3 MMOL/L (ref 3.5–5.3)
PR INTERVAL: 136 MS
PR INTERVAL: 146 MS
QRS AXIS: 45 DEGREES
QRS AXIS: 58 DEGREES
QRSD INTERVAL: 100 MS
QRSD INTERVAL: 92 MS
QT INTERVAL: 398 MS
QT INTERVAL: 402 MS
QTC INTERVAL: 432 MS
QTC INTERVAL: 463 MS
RBC # BLD AUTO: 4.35 MILLION/UL (ref 3.88–5.62)
SODIUM SERPL-SCNC: 138 MMOL/L (ref 136–145)
T WAVE AXIS: 26 DEGREES
T WAVE AXIS: 34 DEGREES
VENTRICULAR RATE: 71 BPM
VENTRICULAR RATE: 80 BPM
WBC # BLD AUTO: 11.88 THOUSAND/UL (ref 4.31–10.16)

## 2022-05-01 PROCEDURE — 80048 BASIC METABOLIC PNL TOTAL CA: CPT | Performed by: STUDENT IN AN ORGANIZED HEALTH CARE EDUCATION/TRAINING PROGRAM

## 2022-05-01 PROCEDURE — 94760 N-INVAS EAR/PLS OXIMETRY 1: CPT

## 2022-05-01 PROCEDURE — 99232 SBSQ HOSP IP/OBS MODERATE 35: CPT | Performed by: STUDENT IN AN ORGANIZED HEALTH CARE EDUCATION/TRAINING PROGRAM

## 2022-05-01 PROCEDURE — 94762 N-INVAS EAR/PLS OXIMTRY CONT: CPT

## 2022-05-01 PROCEDURE — 93010 ELECTROCARDIOGRAM REPORT: CPT | Performed by: INTERNAL MEDICINE

## 2022-05-01 PROCEDURE — 99232 SBSQ HOSP IP/OBS MODERATE 35: CPT | Performed by: PHYSICIAN ASSISTANT

## 2022-05-01 PROCEDURE — 94640 AIRWAY INHALATION TREATMENT: CPT

## 2022-05-01 PROCEDURE — 85025 COMPLETE CBC W/AUTO DIFF WBC: CPT | Performed by: STUDENT IN AN ORGANIZED HEALTH CARE EDUCATION/TRAINING PROGRAM

## 2022-05-01 PROCEDURE — 93005 ELECTROCARDIOGRAM TRACING: CPT

## 2022-05-01 PROCEDURE — 84484 ASSAY OF TROPONIN QUANT: CPT | Performed by: STUDENT IN AN ORGANIZED HEALTH CARE EDUCATION/TRAINING PROGRAM

## 2022-05-01 RX ADMIN — PANTOPRAZOLE SODIUM 40 MG: 20 TABLET, DELAYED RELEASE ORAL at 08:46

## 2022-05-01 RX ADMIN — METHYLPREDNISOLONE SODIUM SUCCINATE 40 MG: 40 INJECTION, POWDER, FOR SOLUTION INTRAMUSCULAR; INTRAVENOUS at 05:32

## 2022-05-01 RX ADMIN — CARBIDOPA AND LEVODOPA 1 TABLET: 25; 100 TABLET ORAL at 15:38

## 2022-05-01 RX ADMIN — FORMOTEROL FUMARATE DIHYDRATE 20 MCG: 20 SOLUTION RESPIRATORY (INHALATION) at 20:47

## 2022-05-01 RX ADMIN — IPRATROPIUM BROMIDE AND ALBUTEROL SULFATE 3 ML: 2.5; .5 SOLUTION RESPIRATORY (INHALATION) at 15:14

## 2022-05-01 RX ADMIN — PANTOPRAZOLE SODIUM 40 MG: 20 TABLET, DELAYED RELEASE ORAL at 17:50

## 2022-05-01 RX ADMIN — IPRATROPIUM BROMIDE AND ALBUTEROL SULFATE 3 ML: 2.5; .5 SOLUTION RESPIRATORY (INHALATION) at 07:08

## 2022-05-01 RX ADMIN — FINASTERIDE 5 MG: 5 TABLET, FILM COATED ORAL at 08:42

## 2022-05-01 RX ADMIN — BUSPIRONE HYDROCHLORIDE 10 MG: 10 TABLET ORAL at 08:42

## 2022-05-01 RX ADMIN — IPRATROPIUM BROMIDE AND ALBUTEROL SULFATE 3 ML: 2.5; .5 SOLUTION RESPIRATORY (INHALATION) at 20:44

## 2022-05-01 RX ADMIN — MIRTAZAPINE 15 MG: 15 TABLET, FILM COATED ORAL at 21:01

## 2022-05-01 RX ADMIN — AMLODIPINE BESYLATE 10 MG: 10 TABLET ORAL at 08:42

## 2022-05-01 RX ADMIN — GABAPENTIN 300 MG: 300 CAPSULE ORAL at 15:38

## 2022-05-01 RX ADMIN — GABAPENTIN 300 MG: 300 CAPSULE ORAL at 20:14

## 2022-05-01 RX ADMIN — BUDESONIDE 0.5 MG: 0.5 INHALANT ORAL at 20:44

## 2022-05-01 RX ADMIN — CHOLESTYRAMINE 4 G: 4 POWDER, FOR SUSPENSION ORAL at 08:42

## 2022-05-01 RX ADMIN — CARBIDOPA AND LEVODOPA 1 TABLET: 25; 100 TABLET ORAL at 11:59

## 2022-05-01 RX ADMIN — METHYLPREDNISOLONE SODIUM SUCCINATE 40 MG: 40 INJECTION, POWDER, FOR SOLUTION INTRAMUSCULAR; INTRAVENOUS at 21:01

## 2022-05-01 RX ADMIN — FLUTICASONE PROPIONATE 2 SPRAY: 50 SPRAY, METERED NASAL at 08:42

## 2022-05-01 RX ADMIN — BUSPIRONE HYDROCHLORIDE 10 MG: 10 TABLET ORAL at 20:14

## 2022-05-01 RX ADMIN — CARBIDOPA AND LEVODOPA 1 TABLET: 25; 100 TABLET ORAL at 07:38

## 2022-05-01 RX ADMIN — ENOXAPARIN SODIUM 40 MG: 40 INJECTION SUBCUTANEOUS at 08:41

## 2022-05-01 RX ADMIN — TAMSULOSIN HYDROCHLORIDE 0.4 MG: 0.4 CAPSULE ORAL at 08:42

## 2022-05-01 RX ADMIN — BUSPIRONE HYDROCHLORIDE 10 MG: 10 TABLET ORAL at 15:38

## 2022-05-01 RX ADMIN — GABAPENTIN 300 MG: 300 CAPSULE ORAL at 08:42

## 2022-05-01 RX ADMIN — METHYLPREDNISOLONE SODIUM SUCCINATE 40 MG: 40 INJECTION, POWDER, FOR SOLUTION INTRAMUSCULAR; INTRAVENOUS at 13:39

## 2022-05-01 RX ADMIN — BUDESONIDE 0.5 MG: 0.5 INHALANT ORAL at 07:08

## 2022-05-01 RX ADMIN — FORMOTEROL FUMARATE DIHYDRATE 20 MCG: 20 SOLUTION RESPIRATORY (INHALATION) at 07:42

## 2022-05-01 RX ADMIN — IPRATROPIUM BROMIDE AND ALBUTEROL SULFATE 3 ML: 2.5; .5 SOLUTION RESPIRATORY (INHALATION) at 11:42

## 2022-05-01 NOTE — PROGRESS NOTES
Progress Note - Pulmonary Medicine  Paul Members 65 y o  male MRN: [id number]  Unit/Bed#: [address] [place] [address] Encounter: [id number]  Code Status: Level 1 - Full Code    Assessment/Plan:    Sonny Holloway is a 65 y o  Past Medical History:   Diagnosis Date    Anesthesia complication     Difficult to wake up    Arthritis     BPH (benign prostatic hyperplasia)     urinary frequency    Cancer (HCC)     basal cell neck, face    [facility])     COPD exacerbation ([facility] ) 12/29/2019    Full dentures     Hiatal hernia     History of methicillin resistant staphylococcus aureus (MRSA)     10/11/2018 MRSA (nares) positive    Hypertension     Irritable bowel syndrome     Kidney stone     at least 7 episodes    Liver disease     Alpha 1- enzyme deficiency - diagnosed 2002  has been on weekly replacement therapy since then    Pulmonary emphysema ([facility] )     1/25/15  FEV1 - 2 45 liters or 59% of predicted    RSV infection 12/2017    Wears glasses     for driving only       Assessment/Plan:     Dyspnea:  -possible worsening baseline but is without worsening hypoxemia  -not overtly in COPD exacerbation although maybe mild underlying exacerbation on chronically debilitated status  -continue steroid trial and if non improved recommendation for cardiology consultation > discussed w/ N   Laura Arias of Cardiology of trialing steroid therapy x 1 more day and if non improved would likely consult for discussion for Lexiscan stress test as his prior evaluation was in 2020  -CTA PE on 04/28 without any gross CT abnormality   Chronic hypoxemic respiratory failure:  -patient is typically on 3 L NC  -has chronic dyspnea on exertion with recent worsening   -stable at 97% on 3 L  Severe emphysematous COPD with Possible Acute Exacerbation:  -FEV 1 40% as of Jan 2020  -unclear if this is exacerbation or generally worsening baseline or non pulmonary dsypnea  -has recently been on steroid taper in OP setting  -trial for improvement w/ methylprednisolone and may require chronic low dose steroid therapy   Alpha-1 antitrypsin deficiency:  -is on weekly Zemaira injections     ______________________________________________________________________    Subjective / 24 hour events:     Still remains dyspneic and non hypoxemic  Discussed w/ patient if non improved by tomorrow will consider cardiology consult for lexiscan stress testing  Pertinent labs Reviewed  Pertinent imaging Reviewed:  CTA PE study  negative for PE    Collaborative Discussion: d/w CFP team Neomia Cabot Tele Events:     Vitals:   Vitals:    22 2355 22 0710 22 0743 22 1021   BP: 158/75 (!) 180/84  159/80   BP Location: Left arm Left arm  Left arm   Pulse:  71  75   Resp:  18  20   Temp:  98 9 °F (37 2 °C)     TempSrc:  Oral     SpO2:  96% 96% 95%   Weight:       Height:         Weight (last 2 days)     None        Temp (24hrs), Av 9 °F (37 2 °C), Min:98 8 °F (37 1 °C), Max:99 °F (37 2 °C)  Current: Temperature: 98 9 °F (37 2 °C)          IV Infusions:       Nutrition:        Diet Orders   (From admission, onward)             Start     Ordered    22 1336  Diet Cardiovascular; Sodium 4 Gm (JEEVAN)  Diet effective now        References:    Nutrtion Support Algorithm Enteral vs  Parenteral   Question Answer Comment   Diet Type Cardiovascular    Cardiac Sodium 4 Gm (JEEVAN)    RD to adjust diet per protocol?  Yes        22 1336    22 1018  Room Service  Once        Question:  Type of Service  Answer:  Room Service - Appropriate with Assistance    22 1017                Ins/Outs:   I/O        07 0700  0701   07 07 07    Urine (mL/kg/hr) 500 250 (0 1)     Total Output 500 250     Net -500 -250                  Lines/Drains:  Invasive Devices  Report    Peripheral Intravenous Line            Peripheral IV 22 Right Antecubital 2 days Active medications:  Scheduled Meds:  Current Facility-Administered Medications   Medication Dose Route Frequency Provider Last Rate    acetaminophen  650 mg Oral Q6H PRN Rachel Gutierrez MD      albuterol  2 puff Inhalation Q4H PRN Rachel Gutierrez MD      amLODIPine  10 mg Oral Daily Rachel Gutierrez MD      budesonide  0 5 mg Nebulization BID Rachel Gutierrez MD      busPIRone  10 mg Oral TID Rachel Gutierrez MD      carbidopa-levodopa  1 tablet Oral TID With Meals Rachel Gutierrez MD      cholestyramine sugar free  4 g Oral Daily Rachel Gutierrez MD      enoxaparin  40 mg Subcutaneous Daily Rahcel Gutierrez MD      finasteride  5 mg Oral QAM Rachel Gutierrez MD      fluticasone  2 spray Nasal Daily Rachel Gutierrez MD      formoterol  20 mcg Nebulization Q12H Amadou Galicia PA-C      gabapentin  300 mg Oral TID Rachel Gutierrez MD      ipratropium-albuterol  3 mL Nebulization 4x Daily Rachel Gutierrez MD      methylPREDNISolone sodium succinate  40 mg Intravenous [facility] Amadou Galicia PA-C      mirtazapine  15 mg Oral HS Rachel Gutierrez MD      pantoprazole  40 mg Oral BID Rachel Gutierrez MD      tamsulosin  0 4 mg Oral Daily Rachel Gutierrez MD       PRN Meds:acetaminophen, 650 mg, Q6H PRN  albuterol, 2 puff, Q4H PRN      ____________________________________________________________________    Physical Exam  Constitutional:       General: He is not in acute distress  Appearance: He is well-developed  HENT:      Head: Normocephalic and atraumatic  Mouth/Throat:      Pharynx: No oropharyngeal exudate  Eyes:      Pupils: Pupils are equal, round, and reactive to light  Neck:      Thyroid: No thyromegaly  Vascular: No JVD  Trachea: No tracheal deviation  Cardiovascular:      Heart sounds: No murmur heard  No friction rub  No gallop      Pulmonary:      Effort: Pulmonary effort is normal  Tachypnea present  No respiratory distress  Breath sounds: Normal breath sounds  No stridor  No decreased breath sounds, wheezing, rhonchi or rales  Comments: 97% on 3 L (baseline)    Conversational dyspnea  Chest:      Chest wall: No tenderness  Abdominal:      General: There is no distension  Tenderness: There is no abdominal tenderness  There is no guarding  Musculoskeletal:         General: Normal range of motion  Cervical back: Normal range of motion and neck supple  Skin:     General: Skin is warm and dry  Findings: No erythema or rash  Neurological:      Mental Status: He is alert and oriented to person, place, and time         ____________________________________________________________________    Invasive/non-invasive ventilation settings   Respiratory  Report   Lab Data (Last 4 hours)    None         O2/Vent Data (Last 4 hours)    None                Laboratory and Diagnostics:  Results from last 7 days   Lab Units 05/01/22  0527 04/30/22  0600 04/29/22  0624 04/28/22  1122   WBC Thousand/uL 11 88* 7 87 11 57* 6 32   HEMOGLOBIN g/dL 13 7 14 4 14 3 14 7   HEMATOCRIT % 41 1 42 8 41 6 44 1   PLATELETS Thousands/uL 136* 145* 132* 118*   NEUTROS PCT % 84*  --  79* 46   MONOS PCT % 4  --  5 6     Results from last 7 days   Lab Units 05/01/22  0527 04/30/22  0600 04/29/22  0512 04/28/22  1122   SODIUM mmol/L 138 138 139 141   POTASSIUM mmol/L 4 3 4 4 4 4 4 2   CHLORIDE mmol/L 105 104 103 103   CO2 mmol/L 24 26 27 30   ANION GAP mmol/L 9 8 9 8   BUN mg/dL 27* 23 23 21   CREATININE mg/dL 1 04 1 08 1 07 1 23   CALCIUM mg/dL 8 7 8 9 9 3 9 0   GLUCOSE RANDOM mg/dL 159* 161* 127 115   ALT U/L  --  37  --  45   AST U/L  --  16  --  25   ALK PHOS U/L  --  73  --  76   ALBUMIN g/dL  --  3 4*  --  3 9   TOTAL BILIRUBIN mg/dL  --  0 79  --  1 21*     Results from last 7 days   Lab Units 04/28/22  1122   MAGNESIUM mg/dL 2 0      Results from last 7 days   Lab Units 04/28/22  1122   INR  1 10 PTT seconds 29              ABG:  Results from last 7 days   Lab Units 04/28/22  1146   PH ART  7 430   PCO2 ART mm Hg 41 4   PO2 ART mm Hg 171 2*   HCO3 ART mmol/L 26 9   BASE EXC ART mmol/L 2 3   ABG SOURCE  Radial, Right     VBG:  Results from last 7 days   Lab Units 04/28/22  1146   ABG SOURCE  Radial, Right           Micro        Imaging:   CTA ED chest PE Study   Final Result by Omayra Mcbride MD (04/28 144)      1  No evidence of acute pulmonary embolism  2  Pulmonary emphysema and parenchymal scarring without evidence of acute infiltrate   3  Trace bilateral effusions                     Workstation performed: [id number]         XR chest 1 view portable   Final Result by Hua Boss MD (04/28 7009)      No acute cardiopulmonary disease  Workstation performed: [id number]             Micro:   Lab Results   Component Value Date    BLOODCX No Growth After 5 Days  08/08/2021    BLOODCX No Growth After 5 Days  08/08/2021    BLOODCX No Growth After 5 Days   12/29/2017    WOUNDCULT 3+ Growth of Staphylococcus aureus (A) 06/12/2019    MRSACULTURE  11/11/2019     No Methicillin Resistant Staphlyococcus aureus (MRSA) isolated            Invalid input(s): Maynor Almonte

## 2022-05-01 NOTE — PLAN OF CARE
Problem: Potential for Falls  Goal: Patient will remain free of falls  Description: INTERVENTIONS:  - Educate patient/family on patient safety including physical limitations  - Instruct patient to call for assistance with activity   - Consult OT/PT to assist with strengthening/mobility   - Keep Call bell within reach  - Keep bed low and locked with side rails adjusted as appropriate  - Keep care items and personal belongings within reach  - Initiate and maintain comfort rounds  - Make Fall Risk Sign visible to staff  - Offer Toileting every 3 Hours, in advance of need  - Initiate/Maintain bed alarm  - Obtain necessary fall risk management equipment: bed   - Apply yellow socks and bracelet for high fall risk patients  - Consider moving patient to room near nurses station  Outcome: Progressing     Problem: Nutrition/Hydration-ADULT  Goal: Nutrient/Hydration intake appropriate for improving, restoring or maintaining nutritional needs  Description: Monitor and assess patient's nutrition/hydration status for malnutrition  Collaborate with interdisciplinary team and initiate plan and interventions as ordered  Monitor patient's weight and dietary intake as ordered or per policy  Utilize nutrition screening tool and intervene as necessary  Determine patient's food preferences and provide high-protein, high-caloric foods as appropriate       INTERVENTIONS:  - Monitor oral intake, urinary output, labs, and treatment plans  - Assess nutrition and hydration status and recommend course of action  - Evaluate amount of meals eaten  - Assist patient with eating if necessary   - Allow adequate time for meals  - Recommend/ encourage appropriate diets, oral nutritional supplements, and vitamin/mineral supplements  - Order, calculate, and assess calorie counts as needed  - Recommend, monitor, and adjust tube feedings and TPN/PPN based on assessed needs  - Assess need for intravenous fluids  - Provide specific nutrition/hydration education as appropriate  - Include patient/family/caregiver in decisions related to nutrition  Outcome: Progressing     Problem: RESPIRATORY - ADULT  Goal: Achieves optimal ventilation and oxygenation  Description: INTERVENTIONS:  - Assess for changes in respiratory status  - Assess for changes in mentation and behavior  - Position to facilitate oxygenation and minimize respiratory effort  - Oxygen administered by appropriate delivery if ordered  - Initiate smoking cessation education as indicated  - Encourage broncho-pulmonary hygiene including cough, deep breathe, Incentive Spirometry  - Assess the need for suctioning and aspirate as needed  - Assess and instruct to report SOB or any respiratory difficulty  - Respiratory Therapy support as indicated  Outcome: Progressing     Problem: MOBILITY - ADULT  Goal: Maintain or return to baseline ADL function  Description: INTERVENTIONS:  -  Assess patient's ability to carry out ADLs; assess patient's baseline for ADL function and identify physical deficits which impact ability to perform ADLs (bathing, care of mouth/teeth, toileting, grooming, dressing, etc )  - Assess/evaluate cause of self-care deficits   - Assess range of motion  - Assess patient's mobility; develop plan if impaired  - Assess patient's need for assistive devices and provide as appropriate  - Encourage maximum independence but intervene and supervise when necessary  - Involve family in performance of ADLs  - Assess for home care needs following discharge   - Consider OT consult to assist with ADL evaluation and planning for discharge  - Provide patient education as appropriate  Outcome: Progressing  Goal: Maintains/Returns to pre admission functional level  Description: INTERVENTIONS:  - Perform BMAT or MOVE assessment daily    - Set and communicate daily mobility goal to care team and patient/family/caregiver     - Collaborate with rehabilitation services on mobility goals if consulted  - Perform Range of Motion 2 times a day  - Reposition patient every 3 hours    - Dangle patient 2 times a day  - Stand patient 3 times a day  - Ambulate patient 2 times a day  - Out of bed to chair 3 times a day   - Out of bed for meals 3 times a day  - Out of bed for toileting  - Record patient progress and toleration of activity level   Outcome: Progressing     Problem: Prexisting or High Potential for Compromised Skin Integrity  Goal: Skin integrity is maintained or improved  Description: INTERVENTIONS:  - Identify patients at risk for skin breakdown  - Assess and monitor skin integrity  - Assess and monitor nutrition and hydration status  - Monitor labs   - Assess for incontinence   - Turn and reposition patient  - Assist with mobility/ambulation  - Relieve pressure over bony prominences  - Avoid friction and shearing  - Provide appropriate hygiene as needed including keeping skin clean and dry  - Evaluate need for skin moisturizer/barrier cream  - Collaborate with interdisciplinary team   - Patient/family teaching  - Consider wound care consult   Outcome: Progressing

## 2022-05-01 NOTE — PROGRESS NOTES
[facility] Practice Progress Note - Therisa Adelina 65 y o  male MRN: [id number]    Unit/Bed#: [address] [place] [address] Encounter: [id number]      Assessment/Plan:  * COPD with acute exacerbation [facility])  Assessment & Plan  Patient with a history of AAT deficiency and centrolobular emphysema presenting with SOB of 2-3 days  On home O2 2L and reports that he has had to increase O2 to 5L  125mg solumedrol given in ED   Trop neg    CTA: No evidence of acute PE  Pulmonary emphysema and parenchymal scarring without evidence of acute infiltrate  Trace bilateral effusions   CXR:  No acute cardiopulmonary disease    · Solu-Medrol q 8h H  · Continuing home DuoNebs 3 mL 4 X daily, fluticasone, budesonide 0 5 mg BID, albuterol Q4 hrs PRN  · Continuous pulse oximetry  · Spirometry  · O2 supplementation, tapering as needed  · Pulmonology consulted, appreciate recommendations  · Patient receives weekly Zemaira injections, will order when indicated if patient remains in hospital    Chest heaviness  Assessment & Plan  Will order troponin reflex to rule out cardiac component  · Follow-up troponins  · EKG    Hypertensive urgency  Assessment & Plan  BP on arrival 223/98, no signs of end-organ damage at this time  Patient states that he is unable to take his medications today  · Continue Norvasc 10 mg PO daily  · Home midodrine held in the setting of uncontrolled BP  · Continue to monitor vital signs  · Will optimize medications as needed    Parkinson's disease [facility])  Assessment & Plan  Patient presented to the ED in respiratory distress  Possible component of COPD and worsening of his parkinson's versus carbidopa levodopa medication washout  He endorses compliance with meds  Will continue to evaluate   Followed and managed by [facility] Neurology office  · Continue carbidopa levodopa  mg p o  TID    Anxiousness  Assessment & Plan  Stable,  · Continue BuSpar 10 mg PO TID  · Continue lorazepam 1 mg PO BID PRN    Depression, recurrent [facility])  Assessment & Plan  Stable  · Continue mirtazapine 15 mg PO HS    Orthostatic hypotension with spine hypertension  Assessment & Plan  BP on arrival 223/98  · Continue hypertensive medications  · Home midodrine held in the setting of hypertensive emergency    Peripheral neuropathy  Assessment & Plan  Stable  · Continue gabapentin    Chronic diarrhea   Assessment & Plan  Stable  · Continue cholestyramine    Ambulatory dysfunction  Assessment & Plan  Chronic  · Fall precautions  · PT/OT    Insomnia  Assessment & Plan  Stable  · Continue home trazodone and mirtazapine     Centrilobular emphysema ([facility] )  Assessment & Plan  Patient seen by pulmonology outpatient yesterday, has been having respiratory distress for several days  Was given Solu-Medrol yesterday with a 5 day course of steroid taper  Presented to the ED with ongoing progressively worsening shortness of breath and accessory muscle use  He has history of emphysema, alpha-1 antitrypsin deficiency  · See under COPD exacerbation    BPH (benign prostatic hyperplasia)  Assessment & Plan  Stable  · Continue Flomax and finasteride    Acute and chronic respiratory failure with hypoxia (HCC)  Assessment & Plan  Acute on chronic, improving    Acute and chronic respiratory failure with hypoxia due to COPD exacerbation, as evidenced by respiratory distress, accessory muscle use, tachypnea on admission  Treated with BiPaP in ED, currently oxygenating well on 4L NC  · See plan under COPD exacerbation    Essential hypertension  Assessment & Plan  BP on admission  · Continue Norvasc 10 mg POD  · Home midodrine held in setting of hypertensive urgency, will restart where appropriate    Gastroesophageal reflux disease  Assessment & Plan  Stable  · Continue home Protonix 40 mg b i d      Alpha-1-antitrypsin deficiency ([facility] )  Assessment & Plan  · On Zemaira 1x week  · Will order when indicated if patient remains in hospital          Subjective:   Patient seen at bedside  Reports feeling well and only gets short of breath on exertion  He also reports having heaviness in the center of his chest when he lays down on his bed  It self resolves, the heaviness can sometimes last a few minutes to few hours  This has been going on for about a week prior to admission  He states he went to the emergency room on 04/21 with heaviness in his chest and shortness of breath  Troponins at that time were negative  Troponins on this admission were also negative  The heaviness does not improve when sitting up and is not worsened on exertion  The heaviness can occur multiple times per day  He thinks the heaviness might be associated to his breathing  He is otherwise feeling well  Objective:     Vitals: Blood pressure 159/80, pulse 75, temperature 98 9 °F (37 2 °C), temperature source Oral, resp  rate 20, height 6' 5" (1 956 m), weight 80 5 kg (177 lb 7 5 oz), SpO2 95 %  ,Body mass index is 21 04 kg/m²  Wt Readings from Last 3 Encounters:   04/28/22 80 5 kg (177 lb 7 5 oz)   04/27/22 80 9 kg (178 lb 6 4 oz)   04/21/22 81 kg (178 lb 9 2 oz)       Intake/Output Summary (Last 24 hours) at 5/1/2022 1057  Last data filed at 5/1/2022 0801  Gross per 24 hour   Intake 130 ml   Output 800 ml   Net -670 ml     Physical Exam  Vitals reviewed  Constitutional:       General: He is awake  He is not in acute distress  Cardiovascular:      Rate and Rhythm: Normal rate and regular rhythm  Heart sounds: Normal heart sounds, S1 normal and S2 normal    Pulmonary:      Effort: Pulmonary effort is normal       Breath sounds: Normal breath sounds  No decreased breath sounds, wheezing, rhonchi or rales  Abdominal:      General: Abdomen is flat  Bowel sounds are normal       Palpations: Abdomen is soft  Tenderness: There is no abdominal tenderness  There is no guarding  Neurological:      Mental Status: He is alert  Psychiatric:         Behavior: Behavior is cooperative             Recent Results (from the past 24 hour(s))   Basic metabolic panel    Collection Time: 05/01/22  5:27 AM   Result Value Ref Range    Sodium 138 136 - 145 mmol/L    Potassium 4 3 3 5 - 5 3 mmol/L    Chloride 105 100 - 108 mmol/L    CO2 24 21 - 32 mmol/L    ANION GAP 9 4 - 13 mmol/L    BUN 27 (H) 5 - 25 mg/dL    Creatinine 1 04 0 60 - 1 30 mg/dL    Glucose 159 (H) 65 - 140 mg/dL    Calcium 8 7 8 3 - 10 1 mg/dL    eGFR 68 ml/min/1 73sq m   CBC and differential    Collection Time: 05/01/22  5:27 AM   Result Value Ref Range    WBC 11 88 (H) 4 31 - 10 16 Thousand/uL    RBC 4 35 3 88 - 5 62 Million/uL    Hemoglobin 13 7 12 0 - 17 0 g/dL    Hematocrit 41 1 36 5 - 49 3 %    MCV 95 82 - 98 fL    MCH 31 5 26 8 - 34 3 pg    MCHC 33 3 31 4 - 37 4 g/dL    RDW 13 6 11 6 - 15 1 %    MPV 9 3 8 9 - 12 7 fL    Platelets 478 (L) 202 - 390 Thousands/uL    nRBC 0 /100 WBCs    Neutrophils Relative 84 (H) 43 - 75 %    Immat GRANS % 2 0 - 2 %    Lymphocytes Relative 10 (L) 14 - 44 %    Monocytes Relative 4 4 - 12 %    Eosinophils Relative 0 0 - 6 %    Basophils Relative 0 0 - 1 %    Neutrophils Absolute 9 96 (H) 1 85 - 7 62 Thousands/µL    Immature Grans Absolute 0 21 (H) 0 00 - 0 20 Thousand/uL    Lymphocytes Absolute 1 21 0 60 - 4 47 Thousands/µL    Monocytes Absolute 0 49 0 17 - 1 22 Thousand/µL    Eosinophils Absolute 0 00 0 00 - 0 61 Thousand/µL    Basophils Absolute 0 01 0 00 - 0 10 Thousands/µL       Current Facility-Administered Medications   Medication Dose Route Frequency Provider Last Rate Last Admin    acetaminophen (TYLENOL) tablet 650 mg  650 mg Oral Q6H PRN Delta Elizabeth MD   650 mg at 04/29/22 2235    albuterol (PROVENTIL HFA,VENTOLIN HFA) inhaler 2 puff  2 puff Inhalation Q4H PRN Delta Elizabeth MD        amLODIPine (NORVASC) tablet 10 mg  10 mg Oral Daily Delta Elizabeth MD   10 mg at 05/01/22 0842    budesonide (PULMICORT) inhalation solution 0 5 mg  0 5 mg Nebulization BID Delta Elizabeth MD   0 5 mg at 05/01/22 0708    busPIRone (BUSPAR) tablet 10 mg  10 mg Oral TID Brandy Marin MD   10 mg at 05/01/22 5796    carbidopa-levodopa (SINEMET)  mg per tablet 1 tablet  1 tablet Oral TID With Meals Brandy Marin MD   1 tablet at 05/01/22 1466    cholestyramine sugar free (QUESTRAN LIGHT) packet 4 g  4 g Oral Daily Brandy Marin MD   4 g at 05/01/22 5703    enoxaparin (LOVENOX) subcutaneous injection 40 mg  40 mg Subcutaneous Daily Brandy Marin MD   40 mg at 05/01/22 0841    finasteride (PROSCAR) tablet 5 mg  5 mg Oral QAM Brandy Marin MD   5 mg at 05/01/22 0842    fluticasone (FLONASE) 50 mcg/act nasal spray 2 spray  2 spray Nasal Daily Brandy Marin MD   2 spray at 05/01/22 0842    formoterol (PERFOROMIST) nebulizer solution 20 mcg  20 mcg Nebulization Q12H Pau Figueroa PA-C   20 mcg at 05/01/22 6524    gabapentin (NEURONTIN) capsule 300 mg  300 mg Oral TID Brandy Marin MD   300 mg at 05/01/22 0842    ipratropium-albuterol (DUO-NEB) 0 5-2 5 mg/3 mL inhalation solution 3 mL  3 mL Nebulization 4x Daily Brandy Marin MD   3 mL at 05/01/22 0708    methylPREDNISolone sodium succinate (Solu-MEDROL) injection 40 mg  40 mg Intravenous [facility] Pau Figueroa PA-C   40 mg at 05/01/22 0532    mirtazapine (REMERON) tablet 15 mg  15 mg Oral HS Brandy Marin MD   15 mg at 04/30/22 2113    pantoprazole (PROTONIX) EC tablet 40 mg  40 mg Oral BID Brandy Marin MD   40 mg at 05/01/22 0846    tamsulosin (FLOMAX) capsule 0 4 mg  0 4 mg Oral Daily Brandy Marin MD   0 4 mg at 05/01/22 2012       Invasive Devices  Report    Peripheral Intravenous Line            Peripheral IV 04/28/22 Right Antecubital 2 days                Lab, Imaging and other studies: I have personally reviewed pertinent reports      VTE Pharmacologic Prophylaxis: Enoxaparin (Lovenox)  VTE Mechanical Prophylaxis: sequential compression device    Lisa Skinner Lily Kendall, MD

## 2022-05-02 ENCOUNTER — APPOINTMENT (INPATIENT)
Dept: NON INVASIVE DIAGNOSTICS | Facility: HOSPITAL | Age: 77
DRG: 189 | End: 2022-05-02
Payer: MEDICARE

## 2022-05-02 LAB
ANION GAP SERPL CALCULATED.3IONS-SCNC: 6 MMOL/L (ref 4–13)
BASOPHILS # BLD AUTO: 0.02 THOUSANDS/ΜL (ref 0–0.1)
BASOPHILS NFR BLD AUTO: 0 % (ref 0–1)
BUN SERPL-MCNC: 28 MG/DL (ref 5–25)
CALCIUM SERPL-MCNC: 8.4 MG/DL (ref 8.3–10.1)
CHLORIDE SERPL-SCNC: 106 MMOL/L (ref 100–108)
CO2 SERPL-SCNC: 28 MMOL/L (ref 21–32)
CREAT SERPL-MCNC: 1.11 MG/DL (ref 0.6–1.3)
EOSINOPHIL # BLD AUTO: 0 THOUSAND/ΜL (ref 0–0.61)
EOSINOPHIL NFR BLD AUTO: 0 % (ref 0–6)
ERYTHROCYTE [DISTWIDTH] IN BLOOD BY AUTOMATED COUNT: 13.6 % (ref 11.6–15.1)
GFR SERPL CREATININE-BSD FRML MDRD: 63 ML/MIN/1.73SQ M
GLUCOSE SERPL-MCNC: 127 MG/DL (ref 65–140)
HCT VFR BLD AUTO: 41.5 % (ref 36.5–49.3)
HGB BLD-MCNC: 14 G/DL (ref 12–17)
IMM GRANULOCYTES # BLD AUTO: 0.27 THOUSAND/UL (ref 0–0.2)
IMM GRANULOCYTES NFR BLD AUTO: 2 % (ref 0–2)
LYMPHOCYTES # BLD AUTO: 1.26 THOUSANDS/ΜL (ref 0.6–4.47)
LYMPHOCYTES NFR BLD AUTO: 11 % (ref 14–44)
MCH RBC QN AUTO: 32.1 PG (ref 26.8–34.3)
MCHC RBC AUTO-ENTMCNC: 33.7 G/DL (ref 31.4–37.4)
MCV RBC AUTO: 95 FL (ref 82–98)
MONOCYTES # BLD AUTO: 0.5 THOUSAND/ΜL (ref 0.17–1.22)
MONOCYTES NFR BLD AUTO: 4 % (ref 4–12)
NEUTROPHILS # BLD AUTO: 9.36 THOUSANDS/ΜL (ref 1.85–7.62)
NEUTS SEG NFR BLD AUTO: 83 % (ref 43–75)
NRBC BLD AUTO-RTO: 0 /100 WBCS
PLATELET # BLD AUTO: 131 THOUSANDS/UL (ref 149–390)
PMV BLD AUTO: 9.3 FL (ref 8.9–12.7)
POTASSIUM SERPL-SCNC: 4.5 MMOL/L (ref 3.5–5.3)
RBC # BLD AUTO: 4.36 MILLION/UL (ref 3.88–5.62)
SODIUM SERPL-SCNC: 140 MMOL/L (ref 136–145)
WBC # BLD AUTO: 11.41 THOUSAND/UL (ref 4.31–10.16)

## 2022-05-02 PROCEDURE — 99232 SBSQ HOSP IP/OBS MODERATE 35: CPT | Performed by: INTERNAL MEDICINE

## 2022-05-02 PROCEDURE — 94762 N-INVAS EAR/PLS OXIMTRY CONT: CPT

## 2022-05-02 PROCEDURE — 99223 1ST HOSP IP/OBS HIGH 75: CPT | Performed by: INTERNAL MEDICINE

## 2022-05-02 PROCEDURE — 94760 N-INVAS EAR/PLS OXIMETRY 1: CPT

## 2022-05-02 PROCEDURE — 93306 TTE W/DOPPLER COMPLETE: CPT

## 2022-05-02 PROCEDURE — 97116 GAIT TRAINING THERAPY: CPT

## 2022-05-02 PROCEDURE — 99232 SBSQ HOSP IP/OBS MODERATE 35: CPT | Performed by: FAMILY MEDICINE

## 2022-05-02 PROCEDURE — 80048 BASIC METABOLIC PNL TOTAL CA: CPT

## 2022-05-02 PROCEDURE — 85025 COMPLETE CBC W/AUTO DIFF WBC: CPT

## 2022-05-02 PROCEDURE — 94640 AIRWAY INHALATION TREATMENT: CPT

## 2022-05-02 RX ORDER — CARVEDILOL 3.12 MG/1
3.12 TABLET ORAL 2 TIMES DAILY WITH MEALS
Status: DISCONTINUED | OUTPATIENT
Start: 2022-05-02 | End: 2022-05-03

## 2022-05-02 RX ADMIN — GABAPENTIN 300 MG: 300 CAPSULE ORAL at 21:18

## 2022-05-02 RX ADMIN — CHOLESTYRAMINE 4 G: 4 POWDER, FOR SUSPENSION ORAL at 08:22

## 2022-05-02 RX ADMIN — CARBIDOPA AND LEVODOPA 1 TABLET: 25; 100 TABLET ORAL at 16:08

## 2022-05-02 RX ADMIN — CARBIDOPA AND LEVODOPA 1 TABLET: 25; 100 TABLET ORAL at 12:13

## 2022-05-02 RX ADMIN — PANTOPRAZOLE SODIUM 40 MG: 20 TABLET, DELAYED RELEASE ORAL at 08:23

## 2022-05-02 RX ADMIN — FLUTICASONE PROPIONATE 2 SPRAY: 50 SPRAY, METERED NASAL at 08:23

## 2022-05-02 RX ADMIN — IPRATROPIUM BROMIDE AND ALBUTEROL SULFATE 3 ML: 2.5; .5 SOLUTION RESPIRATORY (INHALATION) at 16:04

## 2022-05-02 RX ADMIN — MIRTAZAPINE 15 MG: 15 TABLET, FILM COATED ORAL at 21:18

## 2022-05-02 RX ADMIN — BUSPIRONE HYDROCHLORIDE 10 MG: 10 TABLET ORAL at 08:23

## 2022-05-02 RX ADMIN — BUSPIRONE HYDROCHLORIDE 10 MG: 10 TABLET ORAL at 21:18

## 2022-05-02 RX ADMIN — BUDESONIDE 0.5 MG: 0.5 INHALANT ORAL at 19:31

## 2022-05-02 RX ADMIN — IPRATROPIUM BROMIDE AND ALBUTEROL SULFATE 3 ML: 2.5; .5 SOLUTION RESPIRATORY (INHALATION) at 07:19

## 2022-05-02 RX ADMIN — FINASTERIDE 5 MG: 5 TABLET, FILM COATED ORAL at 08:22

## 2022-05-02 RX ADMIN — FORMOTEROL FUMARATE DIHYDRATE 20 MCG: 20 SOLUTION RESPIRATORY (INHALATION) at 07:21

## 2022-05-02 RX ADMIN — TAMSULOSIN HYDROCHLORIDE 0.4 MG: 0.4 CAPSULE ORAL at 08:23

## 2022-05-02 RX ADMIN — BUDESONIDE 0.5 MG: 0.5 INHALANT ORAL at 07:19

## 2022-05-02 RX ADMIN — BUSPIRONE HYDROCHLORIDE 10 MG: 10 TABLET ORAL at 16:08

## 2022-05-02 RX ADMIN — ENOXAPARIN SODIUM 40 MG: 40 INJECTION SUBCUTANEOUS at 08:22

## 2022-05-02 RX ADMIN — CARVEDILOL 3.12 MG: 3.12 TABLET, FILM COATED ORAL at 16:08

## 2022-05-02 RX ADMIN — METHYLPREDNISOLONE SODIUM SUCCINATE 40 MG: 40 INJECTION, POWDER, FOR SOLUTION INTRAMUSCULAR; INTRAVENOUS at 05:24

## 2022-05-02 RX ADMIN — CARBIDOPA AND LEVODOPA 1 TABLET: 25; 100 TABLET ORAL at 07:39

## 2022-05-02 RX ADMIN — IPRATROPIUM BROMIDE AND ALBUTEROL SULFATE 3 ML: 2.5; .5 SOLUTION RESPIRATORY (INHALATION) at 19:31

## 2022-05-02 RX ADMIN — GABAPENTIN 300 MG: 300 CAPSULE ORAL at 16:08

## 2022-05-02 RX ADMIN — IPRATROPIUM BROMIDE AND ALBUTEROL SULFATE 3 ML: 2.5; .5 SOLUTION RESPIRATORY (INHALATION) at 11:07

## 2022-05-02 RX ADMIN — FORMOTEROL FUMARATE DIHYDRATE 20 MCG: 20 SOLUTION RESPIRATORY (INHALATION) at 19:38

## 2022-05-02 RX ADMIN — AMLODIPINE BESYLATE 10 MG: 10 TABLET ORAL at 08:22

## 2022-05-02 RX ADMIN — GABAPENTIN 300 MG: 300 CAPSULE ORAL at 08:23

## 2022-05-02 NOTE — PROGRESS NOTES
Progress Note - Pulmonary   Aundra Sapp 65 y o  male MRN: [id number]  Unit/Bed#: [address] [place] [address] Encounter: [id number]    Assessment/Plan:    Exertional dyspnea with chronic hypoxic respiratory failure, multifactorial due to below   Baseline oxygen requirement is 3 liters via nasal cannula  Titrate oxygen to maintain saturations greater than or equal to 89%  Activity as tolerated  Ambulatory pulse ox prior to discharge  Discussed with cardiology who will evaluate patient for further cardiac workup as etiology for exertional dyspnea  He does have underlying severe lung disease, the possibility of KRYSTAL for which he declines sleep study, and 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 with the possibility of underlying CTEPH  May benefit from right and left heart catheterization, but will defer to the Cardiology team     Severe COPD with possible acute exacerbation   Continue with budesonide nebulized twice daily and DuoNebs every 6 hours  Given lack of improvement on Solu-Medrol 40 milligrams every 8 hours, doubt COPD exacerbation  No active wheezing  Will stop Solu-Medrol today as he has already received a dose  Will re-evaluate off further steroids  Alpha-1 antitrypsin deficiency   On weekly Zemaira injections  Abnormal CT chest with evidence of 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 extending into the lower lobe   This is unchanged on imaging since November 17, 2021  Will discuss need for anticoagulation or further intervention with Dr Romy Nguyen  Suspected obstructive sleep apnea   Has declined sleep study in the past     Outpatient follow-up pending course  Discussed with Cardiology  Chief Complaint:    I feel the same      Subjective:    Rich is resting in bed  He reports he feels the same as he has the last few days  He is still dyspneic on exertion  He has an occasional dry cough, but no sputum production    He denies any chest pain or any other complaints  Objective:    Vitals: Blood pressure (!) 190/80, pulse 77, temperature 98 °F (36 7 °C), temperature source Oral, resp  rate 18, height 6' 5" (1 956 m), weight 80 5 kg (177 lb 7 5 oz), SpO2 97 %  3L NC,Body mass index is 21 04 kg/m²  Intake/Output Summary (Last 24 hours) at 5/2/2022 0831  Last data filed at 5/2/2022 9520  Gross per 24 hour   Intake --   Output 1750 ml   Net -1750 ml       Invasive Devices  Report    Peripheral Intravenous Line            Peripheral IV 04/28/22 Right Antecubital 3 days                Physical Exam:     Physical Exam  Vitals reviewed  Constitutional:       General: He is not in acute distress  Appearance: He is well-developed  He is not toxic-appearing or diaphoretic  Interventions: Nasal cannula in place  HENT:      Head: Normocephalic and atraumatic  Eyes:      General: No scleral icterus  Neck:      Trachea: No tracheal deviation  Cardiovascular:      Rate and Rhythm: Normal rate and regular rhythm  Heart sounds: S1 normal and S2 normal  No murmur heard  No friction rub  No gallop  Pulmonary:      Effort: Pulmonary effort is normal  No tachypnea, accessory muscle usage or respiratory distress  Breath sounds: Normal breath sounds  No stridor  No decreased breath sounds, wheezing, rhonchi or rales  Chest:      Chest wall: No tenderness  Abdominal:      General: Bowel sounds are normal  There is no distension  Palpations: Abdomen is soft  Tenderness: There is no abdominal tenderness  Musculoskeletal:         General: No tenderness  Cervical back: Neck supple  Skin:     General: Skin is warm and dry  Findings: No rash  Neurological:      Mental Status: He is alert and oriented to person, place, and time  GCS: GCS eye subscore is 4  GCS verbal subscore is 5  GCS motor subscore is 6     Psychiatric:         Speech: Speech normal          Behavior: Behavior normal  Behavior is cooperative         Labs:   CBC:   Lab Results   Component Value Date    WBC 11 41 (H) 05/02/2022    HGB 14 0 05/02/2022    HCT 41 5 05/02/2022    MCV 95 05/02/2022     (L) 05/02/2022    MCH 32 1 05/02/2022    MCHC 33 7 05/02/2022    RDW 13 6 05/02/2022    MPV 9 3 05/02/2022    NRBC 0 05/02/2022   , CMP:   Lab Results   Component Value Date    SODIUM 140 05/02/2022    K 4 5 05/02/2022     05/02/2022    CO2 28 05/02/2022    BUN 28 (H) 05/02/2022    CREATININE 1 11 05/02/2022    CALCIUM 8 4 05/02/2022    EGFR 63 05/02/2022     Imaging and other studies: None today

## 2022-05-02 NOTE — PHYSICAL THERAPY NOTE
PT TREATMENT     05/02/22 1530   Note Type   Note Type Treatment   Pain Assessment   Pain Assessment Tool 0-10   Pain Score No Pain   Restrictions/Precautions   Other Precautions Fall Risk;O2   General   Chart Reviewed Yes   Family/Caregiver Present No   Subjective   Subjective Okay   Bed Mobility   Supine to Sit 5  Supervision   Sit to Supine 5  Supervision   Transfers   Sit to Stand 4  Minimal assistance   Additional items Assist x 1;Verbal cues   Stand to Sit 4  Minimal assistance   Additional items Assist x 1;Verbal cues   Ambulation/Elevation   Gait pattern   (Unsteady)   Gait Assistance 4  Minimal assist   Additional items Assist x 1;Verbal cues; Tactile cues   Assistive Device Rolling walker   Distance 20-25 feet with change in direction x2 reps in patient's room  SaO2 on 3 L O2 at rest is 98% and decrease to 94% with ambulation and pt is moderately SOB   Balance   Static Sitting Good   Static Standing Fair   Dynamic Standing Fair -   Ambulatory Fair -   Activity Tolerance   Activity Tolerance Patient limited by fatigue;Treatment limited secondary to medical complications (Comment)  (Shortness of breath)   Assessment   Assessment Pt is noted with improving bed mobility, transfers, ambulation with a RW, balance and endurance  Pt continues to be moderately SOB with limited activity needing extended rest time  Pt will continue to benefit from skilled physical therapy services to increase his strength, gait, endurance, balance and functional mobility  The patient's AM-PAC Basic Mobility Inpatient Short Form Raw Score is 18  A Raw score of greater than 16 suggests the patient may benefit from discharge to home  Please also refer to the recommendation of the Physical Therapist for safe discharge planning  Despite ampac score, pt is appropriate for post acute rehab services when medically stable for discharge to further increase his strength, functional mobility, endurance, balance and gait     Plan Treatment/Interventions ADL retraining;Functional transfer training;LE strengthening/ROM; Therapeutic exercise; Endurance training;Patient/family training;Equipment eval/education; Bed mobility;Gait training; Compensatory technique education   PT Frequency Other (Comment)  (5x/wk)   Recommendation   PT Discharge Recommendation Post acute rehabilitation services   AM-PAC Basic Mobility Inpatient   Turning in Bed Without Bedrails 4   Lying on Back to Sitting on Edge of Flat Bed 3   Moving Bed to Chair 3   Standing Up From Chair 3   Walk in Room 3   Climb 3-5 Stairs 2   Basic Mobility Inpatient Raw Score 18   Basic Mobility Standardized Score 41 05   Highest Level Of Mobility   JH-HLM Goal 6: Walk 10 steps or more   JH-HLM Highest Level of Mobility 7: Walk 25 feet or more   JH-HLM Goal Achieved Yes   Education   Education Provided Mobility training;Assistive device   Patient Reinforcement needed   End of Consult   Patient Position at End of Consult Supine; All needs within reach   [facility] Insurance Number  [place] Number [id number]     Portions of the documentation may have been created using voice recognition software  Occasional wrong word or sound alike substitutions may have occurred due to the inherent limitation of the voice recognition software  Read the chart carefully and recognize, using context, where substitutions have occurred

## 2022-05-02 NOTE — PLAN OF CARE
Problem: Potential for Falls  Goal: Patient will remain free of falls  Description: INTERVENTIONS:  - Educate patient/family on patient safety including physical limitations  - Instruct patient to call for assistance with activity   - Consult OT/PT to assist with strengthening/mobility   - Keep Call bell within reach  - Keep bed low and locked with side rails adjusted as appropriate  - Keep care items and personal belongings within reach  - Initiate and maintain comfort rounds  - Make Fall Risk Sign visible to staff  - Offer Toileting every 2 Hours, in advance of need  - Initiate/Maintain bed alarm  - Obtain necessary fall risk management equipment: yellow bracelet  - Apply yellow socks and bracelet for high fall risk patients  - Consider moving patient to room near nurses station  Outcome: Progressing     Problem: Nutrition/Hydration-ADULT  Goal: Nutrient/Hydration intake appropriate for improving, restoring or maintaining nutritional needs  Description: Monitor and assess patient's nutrition/hydration status for malnutrition  Collaborate with interdisciplinary team and initiate plan and interventions as ordered  Monitor patient's weight and dietary intake as ordered or per policy  Utilize nutrition screening tool and intervene as necessary  Determine patient's food preferences and provide high-protein, high-caloric foods as appropriate       INTERVENTIONS:  - Monitor oral intake, urinary output, labs, and treatment plans  - Assess nutrition and hydration status and recommend course of action  - Evaluate amount of meals eaten  - Assist patient with eating if necessary   - Allow adequate time for meals  - Recommend/ encourage appropriate diets, oral nutritional supplements, and vitamin/mineral supplements  - Order, calculate, and assess calorie counts as needed  - Recommend, monitor, and adjust tube feedings and TPN/PPN based on assessed needs  - Assess need for intravenous fluids  - Provide specific nutrition/hydration education as appropriate  - Include patient/family/caregiver in decisions related to nutrition  Outcome: Progressing

## 2022-05-02 NOTE — PROGRESS NOTES
[facility] Practice Progress Note - Cameron Cali 65 y o  male MRN: [id number]    Unit/Bed#: [address] [place] [address] Encounter: [id number]      Assessment/Plan:  * COPD with acute exacerbation [facility])  Assessment & Plan  Patient with a history of AAT deficiency and centrolobular emphysema presenting with SOB of 2-3 days  On home O2 2L and reports that he has had to increase O2 to 5L  125mg solumedrol given in ED   Trop neg    CTA: No evidence of acute PE  Pulmonary emphysema and parenchymal scarring without evidence of acute infiltrate  Trace bilateral effusions   CXR:  No acute cardiopulmonary disease    · Solu-Medrol q 12h  · Continuing home DuoNebs 3 mL 4 X daily, fluticasone, budesonide 0 5 mg BID, albuterol Q4 hrs PRN  · Continuous pulse oximetry  · Spirometry  · O2 supplementation, tapering as needed  · Pulmonology consulted, appreciate recommendations  · Patient receives weekly Zemaira injections, will order when indicated if patient remains in hospital  · Patient is not improved, will consult Cardiology  Patient going for cardiac catheterization tomorrow  Chest heaviness  Assessment & Plan  Will order troponin reflex to rule out cardiac component  · Follow-up troponins  · EKG    Hypertensive urgency  Assessment & Plan  BP on arrival 223/98, no signs of end-organ damage at this time  Patient states that he is unable to take his medications today  · Continue Norvasc 10 mg PO daily  · Home midodrine held in the setting of uncontrolled BP  · Continue to monitor vital signs  · Will optimize medications as needed    Parkinson's disease [facility])  Assessment & Plan  Patient presented to the ED in respiratory distress  Possible component of COPD and worsening of his parkinson's versus carbidopa levodopa medication washout  He endorses compliance with meds  Will continue to evaluate   Followed and managed by [facility] Neurology office  · Continue carbidopa levodopa  mg p o  TID    Anxiousness  Assessment & Plan  Stable,  · Continue BuSpar 10 mg PO TID  · Continue lorazepam 1 mg PO BID PRN    Depression, recurrent (HCC)  Assessment & Plan  Stable  · Continue mirtazapine 15 mg PO HS    Orthostatic hypotension with spine hypertension  Assessment & Plan  BP on arrival 223/98  · Continue hypertensive medications  · Home midodrine held in the setting of hypertensive emergency    Peripheral neuropathy  Assessment & Plan  Stable  · Continue gabapentin    Chronic diarrhea   Assessment & Plan  Stable  · Continue cholestyramine    Ambulatory dysfunction  Assessment & Plan  Chronic  · Fall precautions  · PT/OT    Insomnia  Assessment & Plan  Stable  · Continue home trazodone and mirtazapine     Centrilobular emphysema ([facility] [address] )  Assessment & Plan  Patient seen by pulmonology outpatient yesterday, has been having respiratory distress for several days  Was given Solu-Medrol yesterday with a 5 day course of steroid taper  Presented to the ED with ongoing progressively worsening shortness of breath and accessory muscle use  He has history of emphysema, alpha-1 antitrypsin deficiency  · See under COPD exacerbation    BPH (benign prostatic hyperplasia)  Assessment & Plan  Stable  · Continue Flomax and finasteride    Acute and chronic respiratory failure with hypoxia (HCC)  Assessment & Plan  Acute on chronic, improving    Acute and chronic respiratory failure with hypoxia due to COPD exacerbation, as evidenced by respiratory distress, accessory muscle use, tachypnea on admission  Treated with BiPaP in ED, currently oxygenating well on 4L NC  · See plan under COPD exacerbation    Essential hypertension  Assessment & Plan  BP on admission  · Continue Norvasc 10 mg POD  · Home midodrine held in setting of hypertensive urgency, will restart where appropriate    Gastroesophageal reflux disease  Assessment & Plan  Stable  · Continue home Protonix 40 mg b i d      Alpha-1-antitrypsin deficiency ([facility] [address] )  Assessment & Plan  · On Zemaira 1x week  · Will order when indicated if patient remains in hospital          Subjective:   Patient seen at bedside  Reports dyspnea on exertion shortness of breath  Denies chest pain, but reports some the yesterday night he abdominal pain that improved with passing gas and bowel movement  Objective:     Vitals: Blood pressure 167/89, pulse 73, temperature 98 °F (36 7 °C), temperature source Oral, resp  rate 18, height 6' 5" (1 956 m), weight 80 3 kg (177 lb), SpO2 94 %  ,Body mass index is 20 99 kg/m²  Wt Readings from Last 3 Encounters:   05/02/22 80 3 kg (177 lb)   04/27/22 80 9 kg (178 lb 6 4 oz)   04/21/22 81 kg (178 lb 9 2 oz)       Intake/Output Summary (Last 24 hours) at 5/2/2022 1237  Last data filed at 5/2/2022 1001  Gross per 24 hour   Intake 130 ml   Output 2050 ml   Net -1920 ml       Physical Exam  Vitals reviewed  Constitutional:       General: He is awake  Cardiovascular:      Rate and Rhythm: Normal rate and regular rhythm  Heart sounds: Normal heart sounds, S1 normal and S2 normal    Pulmonary:      Effort: Pulmonary effort is normal       Breath sounds: Normal breath sounds  No decreased breath sounds, wheezing, rhonchi or rales  Abdominal:      General: Abdomen is flat  Bowel sounds are normal       Palpations: Abdomen is soft  Tenderness: There is no abdominal tenderness  There is no guarding  Neurological:      Mental Status: He is alert  Psychiatric:         Behavior: Behavior is cooperative             Recent Results (from the past 24 hour(s))   HS Troponin I 2hr    Collection Time: 05/01/22  1:39 PM   Result Value Ref Range    hs TnI 2hr 11 "Refer to ACS Flowchart"- see link ng/L    Delta 2hr hsTnI 0 <20 ng/L   Basic metabolic panel    Collection Time: 05/02/22  4:34 AM   Result Value Ref Range    Sodium 140 136 - 145 mmol/L    Potassium 4 5 3 5 - 5 3 mmol/L    Chloride 106 100 - 108 mmol/L    CO2 28 21 - 32 mmol/L    ANION GAP 6 4 - 13 mmol/L    BUN 28 (H) 5 - 25 mg/dL Creatinine 1 11 0 60 - 1 30 mg/dL    Glucose 127 65 - 140 mg/dL    Calcium 8 4 8 3 - 10 1 mg/dL    eGFR 63 ml/min/1 73sq m   CBC and differential    Collection Time: 05/02/22  4:34 AM   Result Value Ref Range    WBC 11 41 (H) 4 31 - 10 16 Thousand/uL    RBC 4 36 3 88 - 5 62 Million/uL    Hemoglobin 14 0 12 0 - 17 0 g/dL    Hematocrit 41 5 36 5 - 49 3 %    MCV 95 82 - 98 fL    MCH 32 1 26 8 - 34 3 pg    MCHC 33 7 31 4 - 37 4 g/dL    RDW 13 6 11 6 - 15 1 %    MPV 9 3 8 9 - 12 7 fL    Platelets 947 (L) 040 - 390 Thousands/uL    nRBC 0 /100 WBCs    Neutrophils Relative 83 (H) 43 - 75 %    Immat GRANS % 2 0 - 2 %    Lymphocytes Relative 11 (L) 14 - 44 %    Monocytes Relative 4 4 - 12 %    Eosinophils Relative 0 0 - 6 %    Basophils Relative 0 0 - 1 %    Neutrophils Absolute 9 36 (H) 1 85 - 7 62 Thousands/µL    Immature Grans Absolute 0 27 (H) 0 00 - 0 20 Thousand/uL    Lymphocytes Absolute 1 26 0 60 - 4 47 Thousands/µL    Monocytes Absolute 0 50 0 17 - 1 22 Thousand/µL    Eosinophils Absolute 0 00 0 00 - 0 61 Thousand/µL    Basophils Absolute 0 02 0 00 - 0 10 Thousands/µL   Echo complete w/ contrast if indicated    Collection Time: 05/02/22 10:36 AM   Result Value Ref Range    LA size 3 3 cm    Triscuspid Valve Regurgitation Peak Gradient 41 0 mmHg    Tricuspid valve peak regurgitation velocity 3 19 m/s    LVPWd 1 10 0 53 - 1 00 cm    Left Atrium Area-systolic Apical Two Chamber 15 1 cm2    Left Atrium Area-systolic Four Chamber 36 6 cm2    MV E' Tissue Velocity Septal 9 cm/s    MV E' Tissue Velocity Lateral 7 cm/s    TR Peak Myron 3 2 m/s    IVSd 3 55 cm    LV DIASTOLIC VOLUME (MOD BIPLANE) 2D 60 mL    LEFT VENTRICLE SYSTOLIC VOLUME (MOD BIPLANE) 2D 36 mL    Left ventricular stroke volume (2D) 25 00 mL    A4C EF 41 %    LA length (A2C) 4 70 cm    LVIDd 3 80 5 22 - 7 77 cm    IVS 1 0 54 - 1 01 cm    LVIDS 3 00 3 17 - 4 79 cm    FS 21 28 - 44    Ao root 4 00 cm    RVID d 3 1 cm    AV LVOT peak gradient 5 mmHg    MV valve area p 1/2 method 3 10     E wave deceleration time 240 ms    LVOT diameter 2 1 cm    AV peak gradient 11 mmHg    MV Peak E Myron 64 cm/s    MV Peak A Myron 1 04 m/s    LEVY A4C 15 3 cm2    RAA A4C 25 8 cm2    MV stenosis pressure 1/2 time 70 ms    LVSV, 2D 25 mL    LVOT area 3 46 cm2    ZLVPWD 2 79     ZLVIDS -2 06     ZLVIDD -5 16     ZIVSD 1 85     LA Volume Index (BP) 15 5     E/A ratio 0 62        Current Facility-Administered Medications   Medication Dose Route Frequency Provider Last Rate Last Admin    acetaminophen (TYLENOL) tablet 650 mg  650 mg Oral Q6H PRN Hortencia He MD   650 mg at 04/29/22 2235    albuterol (PROVENTIL HFA,VENTOLIN HFA) inhaler 2 puff  2 puff Inhalation Q4H PRN Hortencia He MD        amLODIPine (NORVASC) tablet 10 mg  10 mg Oral Daily Hortencia He MD   10 mg at 05/02/22 3480    budesonide (PULMICORT) inhalation solution 0 5 mg  0 5 mg Nebulization BID Hortencia He MD   0 5 mg at 05/02/22 0719    busPIRone (BUSPAR) tablet 10 mg  10 mg Oral TID Hortencia He MD   10 mg at 05/02/22 0049    carbidopa-levodopa (SINEMET)  mg per tablet 1 tablet  1 tablet Oral TID With Meals Hortencia He MD   1 tablet at 05/02/22 1213    carvedilol (COREG) tablet 3 125 mg  3 125 mg Oral BID With Meals TOPHER Joseph        cholestyramine sugar free (QUESTRAN LIGHT) packet 4 g  4 g Oral Daily Hortencia He MD   4 g at 05/02/22 5828    enoxaparin (LOVENOX) subcutaneous injection 40 mg  40 mg Subcutaneous Daily Hortencia He MD   40 mg at 05/02/22 3343    finasteride (PROSCAR) tablet 5 mg  5 mg Oral QAM Hortencia He MD   5 mg at 05/02/22 8897    fluticasone (FLONASE) 50 mcg/act nasal spray 2 spray  2 spray Nasal Daily Hortencia He MD   2 spray at 05/02/22 0823    formoterol (PERFOROMIST) nebulizer solution 20 mcg  20 mcg Nebulization Q12H Danny Remy PA-C   20 mcg at 05/02/22 0409    gabapentin (NEURONTIN) capsule 300 mg  300 mg Oral TID Awilda Green MD   300 mg at 05/02/22 0823    ipratropium-albuterol (DUO-NEB) 0 5-2 5 mg/3 mL inhalation solution 3 mL  3 mL Nebulization 4x Daily Awilda Green MD   3 mL at 05/02/22 1107    mirtazapine (REMERON) tablet 15 mg  15 mg Oral HS Awilda Green MD   15 mg at 05/01/22 2101    pantoprazole (PROTONIX) EC tablet 40 mg  40 mg Oral BID Awilda Green MD   40 mg at 05/02/22 0823    tamsulosin (FLOMAX) capsule 0 4 mg  0 4 mg Oral Daily Awilda Green MD   0 4 mg at 05/02/22 7073       Invasive Devices  Report    Peripheral Intravenous Line            Peripheral IV 04/28/22 Right Antecubital 4 days                Lab, Imaging and other studies: I have personally reviewed pertinent reports      VTE Pharmacologic Prophylaxis: Enoxaparin (Lovenox)  VTE Mechanical Prophylaxis: sequential compression device    Cielo Cardona MD

## 2022-05-02 NOTE — PLAN OF CARE
Problem: PHYSICAL THERAPY ADULT  Goal: Performs mobility at highest level of function for planned discharge setting  See evaluation for individualized goals  Outcome: Progressing  Note: Prognosis: Good  Problem List: Decreased strength,Decreased range of motion,Decreased endurance,Impaired balance,Decreased mobility  Assessment: Pt is noted with improving bed mobility, transfers, ambulation with a RW, balance and endurance  Pt continues to be moderately SOB with limited activity needing extended rest time  Pt will continue to benefit from skilled physical therapy services to increase his strength, gait, endurance, balance and functional mobility  Barriers to Discharge: Decreased caregiver support        PT Discharge Recommendation: Post acute rehabilitation services          See flowsheet documentation for full assessment

## 2022-05-02 NOTE — CONSULTS
Consultation - Cardiology   Xiao Feliciano 65 y o  male MRN: [id number]  Unit/Bed#: [address] [place] [address] Encounter: [id number]    Assessment/Plan     Assessment:  1  COPD with history of alpha-1 antitrypsin deficiency:  Current symptoms appear to be out of proportion with clinical findings  2  Hypertension  3  Chest pressure  4  Previous history of orthostatic hypotension  5  Parkinson's disease      Plan:  Patient has been admitted to the service of [facility]  1  Will add Coreg 3 125 mg b i d  To patient's Norvasc as blood pressures have been uncontrolled while he is here in hospital     2  Monitor orthostatic vital signs  Midodrine was currently held by primary team on admission, he was taking 2 mg t i d  At home  3  Encourage use of support stockings    4  Discussed with patient and he is agreeable for both left and right heart catheterization to evaluate his shortness of breath  As there is question of possible balance ischemia versus CTEPH  Orders have been placed in chart for procedure on 05/03/2022  History of Present Illness   Physician Requesting Consult: Philomena De La Torre MD  Reason for Consult / Principal Problem:  Persistent and worsening shortness of Breath out of proportion of clinical findings  Pulmonary request for right heart catheterization      HPI: Xiao Feliciano is a 65y o  year old male who initially presented and was admitted on 04/28/2022 with complaints of increasing shortness of breath with intermittent chest heaviness for a few weeks  He states it is got to the point he cannot walk 20 ft in the hospital room (going from bed to bathroom) without profound shortness of breath and chest heaviness  Patient does have a known history of COPD with alpha-1 antitrypsin deficiency  After conversation with Pulmonary, there is some concern for worsening pulmonary hypertension as in 2018, by echo peak pulmonary pressures were 30 and 1 year later they were 51 mm Hg    Current echo pending  They also states they is noted concern for CTEPH as an underlying cause of his increasing shortness of breath  Initial high sensitivity troponins on admission were 28, 24 and 20, repeat done yesterday both high sensitivity troponins were 11, NT proBNP was 429  EKG on admission demonstrates sinus rhythm with nonspecific ST and T-wave changes in V 3 through V6 leads  EKG performed yesterday demonstrated sinus rhythm  Patient did have a [facility] nuclear stress test in November of 2019 which initially showed a moderate-sized moderately severe fixed defect in the entire inferior wall which was felt to be due to diaphragmatic attenuation as this resolved with prone images  Inpatient consult to Cardiology  Consult performed by: TOPHER Russell  Consult ordered by: Lulu Flores MD          Review of Systems   Constitutional: Positive for activity change and fatigue  Negative for appetite change and fever  HENT: Negative for congestion, ear pain, mouth sores, rhinorrhea and sore throat  Eyes: Negative  Negative for photophobia and visual disturbance  Respiratory: Positive for cough, chest tightness and shortness of breath  Cardiovascular: Negative  Negative for chest pain  Gastrointestinal: Negative for abdominal distention, constipation, diarrhea, nausea and vomiting  Endocrine: Negative  Negative for polydipsia, polyphagia and polyuria  Genitourinary: Negative  Negative for difficulty urinating  Musculoskeletal: Negative  Negative for arthralgias, gait problem and neck pain  Neurological: Negative  Negative for dizziness, syncope, speech difficulty, weakness and light-headedness  Hematological: Negative  Psychiatric/Behavioral: Negative  Negative for agitation, decreased concentration, hallucinations, self-injury and suicidal ideas         Historical Information   Past Medical History:   Diagnosis Date    Anesthesia complication     Difficult to wake up    Arthritis     BPH (benign prostatic hyperplasia)     urinary frequency    Cancer (HCC)     basal cell neck, face    COPD (chronic obstructive pulmonary disease) (HCC)     COPD exacerbation ([facility] ) 12/29/2019    Full dentures     Hiatal hernia     History of methicillin resistant staphylococcus aureus (MRSA)     10/11/2018 MRSA (nares) positive    Hypertension     Irritable bowel syndrome     Kidney stone     at least 7 episodes    Liver disease     Alpha 1- enzyme deficiency - diagnosed 2002  has been on weekly replacement therapy since then    Pulmonary emphysema ([facility] )     1/25/15  FEV1 - 2 45 liters or 59% of predicted    RSV infection 12/2017    Wears glasses     for driving only     Past Surgical History:   Procedure Laterality Date    BACK SURGERY  2008    discectomy    COLONOSCOPY      COLONOSCOPY N/A 3/10/2017    Procedure: Kasandra Nazario;  Surgeon: Philip Avelar MD;  Location: [facility]; Service:    Ariel Plsamantha      removal of kidney stones    ESOPHAGOGASTRODUODENOSCOPY N/A 3/10/2017    Procedure: ESOPHAGOGASTRODUODENOSCOPY (EGD); Surgeon: Philip Avelar MD;  Location: [facility] GI LAB; Service:     LITHOTRIPSY      TN ESOPHAGOGASTRODUODENOSCOPY TRANSORAL DIAGNOSTIC N/A 11/20/2018    Procedure: ESOPHAGOGASTRODUODENOSCOPY (EGD); Surgeon: Philip Avelar MD;  Location: [facility] GI LAB;   Service: Gastroenterology    TONSILLECTOMY      VEIN LIGATION AND STRIPPING Bilateral     1980's     Social History     Substance and Sexual Activity   Alcohol Use Not Currently    Comment: stopped in 2009     Social History     Substance and Sexual Activity   Drug Use Not Currently     E-Cigarette/Vaping    E-Cigarette Use Never User      E-Cigarette/Vaping Substances    Nicotine No     THC No     CBD No     Flavoring No     Other No     Unknown No      Social History     Tobacco Use   Smoking Status Former Smoker    Packs/day: 1 00    Years: 60 00    Pack years: 60 00    Quit date: 2017    Years since quittin 6   Smokeless Tobacco Never Used   Tobacco Comment    quit in 2017     Family History:   Family History   Problem Relation Age of Onset    Emphysema Mother         never smoked    Emphysema Father     Cancer Brother         colon    Colon cancer Brother     Ulcerative colitis Family     Liver disease Family        Meds/Allergies   all current active meds have been reviewed, current meds:   Current Facility-Administered Medications   Medication Dose Route Frequency    acetaminophen (TYLENOL) tablet 650 mg  650 mg Oral Q6H PRN    albuterol (PROVENTIL HFA,VENTOLIN HFA) inhaler 2 puff  2 puff Inhalation Q4H PRN    amLODIPine (NORVASC) tablet 10 mg  10 mg Oral Daily    budesonide (PULMICORT) inhalation solution 0 5 mg  0 5 mg Nebulization BID    busPIRone (BUSPAR) tablet 10 mg  10 mg Oral TID    carbidopa-levodopa (SINEMET)  mg per tablet 1 tablet  1 tablet Oral TID With Meals    carvedilol (COREG) tablet 3 125 mg  3 125 mg Oral BID With Meals    cholestyramine sugar free (QUESTRAN LIGHT) packet 4 g  4 g Oral Daily    enoxaparin (LOVENOX) subcutaneous injection 40 mg  40 mg Subcutaneous Daily    finasteride (PROSCAR) tablet 5 mg  5 mg Oral QAM    fluticasone (FLONASE) 50 mcg/act nasal spray 2 spray  2 spray Nasal Daily    formoterol (PERFOROMIST) nebulizer solution 20 mcg  20 mcg Nebulization Q12H    gabapentin (NEURONTIN) capsule 300 mg  300 mg Oral TID    ipratropium-albuterol (DUO-NEB) 0 5-2 5 mg/3 mL inhalation solution 3 mL  3 mL Nebulization 4x Daily    methylPREDNISolone sodium succinate (Solu-MEDROL) injection 40 mg  40 mg Intravenous Q8H [address]    mirtazapine (REMERON) tablet 15 mg  15 mg Oral HS    pantoprazole (PROTONIX) EC tablet 40 mg  40 mg Oral BID    tamsulosin (FLOMAX) capsule 0 4 mg  0 4 mg Oral Daily    and PTA meds:   Prior to Admission Medications   Prescriptions Last Dose Informant Patient Reported? Taking?    Diclofenac Sodium (VOLTAREN) 1 %  Self No No   Sig: Apply 2 g topically 4 (four) times a day for 7 days Apply to R knee   LORazepam (ATIVAN) 1 mg tablet 2022 at Unknown time Self Yes Yes   OXYGEN-HELIUM IN  Self Yes No   Sig: Inhale 2L at rest- 3L with activity   Ventolin  (90 Base) MCG/ACT inhaler 2022 at Unknown time Self No Yes   Sig: Inhale 2 puffs every 4 (four) hours as needed for wheezing   ZEMAIRA infusion Past Week at Unknown time Self Yes Yes   Sig: once a week    amLODIPine (NORVASC) 10 mg tablet 2022 at Unknown time Self No Yes   Sig: TAKE ONE TABLET BY MOUTH DAILY    budesonide (PULMICORT) 0 5 mg/2 mL nebulizer solution  Self No No   Sig: Take 2 mL (0 5 mg total) by nebulization 2 (two) times a day   busPIRone (BUSPAR) 10 mg tablet 2022 at Unknown time Self No Yes   Sig: TAKE ONE TABLET BY MOUTH THREE TIMES DAILY    carbamide peroxide (DEBROX) 6 5 % otic solution Past Month at Unknown time Self No Yes   Sig: Administer 5 drops into both ears 2 (two) times a day as needed for ear pain   carbidopa-levodopa (SINEMET)  mg per tablet Not Taking at Unknown time  No No   Sig: TAKE HALF TABLET BY MOUTH 30 minutes prior to breakfast, lunch, and dinner for 1 week   then increase to 1 tablet prior to each meal   Patient not taking: Reported on 2022    cholestyramine (QUESTRAN) 4 g packet 2022 at Unknown time Self Yes Yes   Sig: Take 1 packet by mouth daily   doxylamine (Unisom SleepTabs) 25 MG tablet 2022 at Unknown time Self Yes Yes   Sig: Take 25 mg by mouth daily at bedtime as needed for sleep   finasteride (PROSCAR) 5 mg tablet 2022 at Unknown time Self No Yes   Sig: TAKE ONE TABLET BY MOUTH IN THE MORNING    fluticasone (FLONASE) 50 mcg/act nasal spray 2022 at Unknown time Self No Yes   Si sprays into each nostril daily   gabapentin (NEURONTIN) 300 mg capsule 2022 at Unknown time  No Yes   Sig: TAKE ONE CAPSULE BY MOUTH THREE TIMES DAILY ipratropium-albuterol (DUO-NEB) 0 5-2 5 mg/3 mL nebulizer solution 4/27/2022 at Unknown time Self No Yes   Sig: Take 3 mL by nebulization 4 (four) times a day   midodrine (PROAMATINE) 10 MG tablet Unknown at Unknown time Self No No   Sig: Take 1 tab three times daily  Please hold medication if blood pressure is above 169 systolic    mirtazapine (REMERON) 15 mg tablet 4/27/2022 at Unknown time Self No Yes   Sig: Take 1 tablet (15 mg total) by mouth daily at bedtime   pantoprazole (PROTONIX) 40 mg tablet 4/27/2022 at Unknown time Self No Yes   Sig: TAKE ONE TABLET BY MOUTH TWICE DAILY   predniSONE 20 mg tablet 4/27/2022 at Unknown time  No Yes   Sig: Three tablets daily for 3 days, 2 tablets daily for 3 days, 1 tablet daily for 10 days   tamsulosin (FLOMAX) 0 4 mg 4/27/2022 at Unknown time Self No Yes   Sig: TAKE ONE CAPSULE BY MOUTH DAILY      Facility-Administered Medications: None     Allergies   Allergen Reactions    Chantix [Varenicline]      Caused prostate infection       Objective   Vitals: Blood pressure 167/89, pulse 73, temperature 98 °F (36 7 °C), temperature source Oral, resp  rate 18, height 6' 5" (1 956 m), weight 80 3 kg (177 lb), SpO2 97 %  Orthostatic Blood Pressures      Most Recent Value   Blood Pressure 167/89 filed at 05/02/2022 2636   Patient Position - Orthostatic VS Sitting filed at 05/02/2022 4530            Intake/Output Summary (Last 24 hours) at 5/2/2022 1052  Last data filed at 5/2/2022 1001  Gross per 24 hour   Intake 130 ml   Output 2050 ml   Net -1920 ml       Invasive Devices  Report    Peripheral Intravenous Line            Peripheral IV 04/28/22 Right Antecubital 3 days                Physical Exam  Vitals and nursing note reviewed  Constitutional:       General: He is not in acute distress  Appearance: Normal appearance  He is normal weight     HENT:      Right Ear: External ear normal       Left Ear: External ear normal       Nose: Nose normal    Eyes:      General: No scleral icterus  Right eye: No discharge  Left eye: No discharge  Cardiovascular:      Rate and Rhythm: Normal rate and regular rhythm  Pulses: Normal pulses  Heart sounds: Normal heart sounds  Pulmonary:      Effort: Pulmonary effort is normal  No accessory muscle usage or respiratory distress  Breath sounds: Examination of the right-lower field reveals decreased breath sounds  Examination of the left-lower field reveals decreased breath sounds  Decreased breath sounds present  No wheezing  Abdominal:      General: Bowel sounds are normal  There is no distension  Palpations: Abdomen is soft  Musculoskeletal:      Right lower leg: No edema  Left lower leg: No edema  Skin:     General: Skin is warm and dry  Capillary Refill: Capillary refill takes less than 2 seconds  Neurological:      General: No focal deficit present  Mental Status: He is alert and oriented to person, place, and time  Mental status is at baseline  Lab Results:   I have personally reviewed pertinent lab results  CBC with diff:   Results from last 7 days   Lab Units 05/02/22  0434   WBC Thousand/uL 11 41*   RBC Million/uL 4 36   HEMOGLOBIN g/dL 14 0   HEMATOCRIT % 41 5   MCV fL 95   MCH pg 32 1   MCHC g/dL 33 7   RDW % 13 6   MPV fL 9 3   PLATELETS Thousands/uL 131*     CMP:   Results from last 7 days   Lab Units 05/02/22  0434 05/01/22  0527 04/30/22  0600   SODIUM mmol/L 140   < > 138   CHLORIDE mmol/L 106   < > 104   CO2 mmol/L 28   < > 26   BUN mg/dL 28*   < > 23   CREATININE mg/dL 1 11   < > 1 08   CALCIUM mg/dL 8 4   < > 8 9   AST U/L  --   --  16   ALT U/L  --   --  37   ALK PHOS U/L  --   --  73   EGFR ml/min/1 73sq m 63   < > 65    < > = values in this interval not displayed       HS Troponin:   0   Lab Value Date/Time    HSTNI0 11 05/01/2022 1110    HSTNI2 11 05/01/2022 1339    HSTNI4 20 04/28/2022 1618    HSTNI4 10 11/17/2021 1946     BNP:   Results from last 7 days Lab Units 05/02/22  0434   POTASSIUM mmol/L 4 5   CHLORIDE mmol/L 106   CO2 mmol/L 28   BUN mg/dL 28*   CREATININE mg/dL 1 11   CALCIUM mg/dL 8 4   EGFR ml/min/1 73sq m 63     Coags:   Results from last 7 days   Lab Units 04/28/22  1122   PTT seconds 29   INR  1 10     Magnesium:   Results from last 7 days   Lab Units 04/28/22  1122   MAGNESIUM mg/dL 2 0     Lipid Profile:     Imaging: I have personally reviewed pertinent reports  EKG:  EKG performed on 05/01 4 demonstrates sinus rhythm, EKG from 05/28 demonstrates sinus rhythm with nonspecific ST-T segment changes in V3 through V6    VTE Prophylaxis: Sequential compression device Britta Barnes)     Code Status: Level 1 - Full Code  Advance Directive and Living Will:      Power of :    POLST:      Diallo Pedersen, [address]  Cardiology

## 2022-05-02 NOTE — CASE MANAGEMENT
Case Management Assessment & Discharge Planning Note    Patient name Shahbaz Moya  [facility] [address] [facility]  [address] MRN [id number]  : 1945 Date 2022       Current Admission Date: 2022  Current Admission Diagnosis:COPD with acute exacerbation [facility])   Patient Active Problem List    Diagnosis Date Noted    Chest heaviness 2022    Hypertensive urgency 2022    Other secondary hypertension 2022    Cognitive impairment 2021    Parkinson's disease ([address] ) 2021    Anxiousness 2021    Vitamin D deficiency disease 2021    Avascular necrosis of hip, right ([address] ) 2021    Depression, recurrent ([address] ) 2021    Palliative care patient 11/10/2020    Orthostatic hypotension with spine hypertension 2020    Thrombocytopenia ([address] ) 2020    COPD with acute exacerbation ([address] ) 2019    Other constipation 2019    Ambulatory dysfunction 2019    Pulmonary hypertension ([address] ) 2019    Insomnia 2019    Chronic venous insufficiency 2019    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 (HCC) 2019    Herpes labialis 2019    Elevated PSA 10/25/2018    Centrilobular emphysema ([address] ) 2018    Chronic respiratory failure with hypoxia ([address] ) 2018    Former smoker 2018    Liver disease     Alpha-1-antitrypsin deficiency ([address] ) 2017    Gastroesophageal reflux disease 2017    Essential hypertension 2017    Acute and chronic respiratory failure with hypoxia ([address] ) 2017    BPH (benign prostatic hyperplasia) 2017    Peripheral neuropathy 2017    Allergic rhinitis 2016    Cataracts, both eyes 2015    Chronic diarrhea  2014    Benign colon polyp 2014      LOS (days): 4  Geometric Mean LOS (GMLOS) (days): 3 60  Days to GMLOS:-0 3     OBJECTIVE:    Risk of Unplanned Readmission Score: 18      Current admission status: Inpatient     Preferred Pharmacy:   [address] [facility], [address]  [address] [address]  Phone: [phone] Fax: [phone]    [id number] [address]  [address] [facility] [address]  Phone: [phone] Fax: [phone]    [address], [address]  [address] [facility] [place] [address]  Phone: [phone] Fax: [phone]    Primary Care Provider: Melinda Valenzuela MD    Primary Insurance: MEDICARE  Secondary Insurance: SHRUTHI MEDINA FERRER    ASSESSMENT:  Rik Becerra, [address] - Daughter   Primary Phone: [phone] (Mobile)            Readmission Root Cause  30 Day Readmission: No    Patient Information  Admitted from[de-identified] Home  Mental Status: Alert  During Assessment patient was accompanied by: Not accompanied during assessment  Assessment information provided by[de-identified] Patient,Daughter  Primary Caregiver: Self  Support Systems: Daughter  [place] of Residence: [address] [facility] do you live in?: [place], [address] entry access options   Select all that apply : Stairs  Number of steps to enter home : 1  Type of Current Residence: [facility]  In the last 12 months, was there a time when you were not able to pay the mortgage or rent on time?: No  In the last 12 months, how many places have you lived?: 1  In the last 12 months, was there a time when you did not have a steady place to sleep or slept in a shelter (including now)?: No  Homeless/housing insecurity resource given?: N/A  Living Arrangements: Lives Alone    Activities of Daily Living Prior to Admission  Functional Status: Independent  Completes ADLs independently?: Yes  Ambulates independently?: Yes  Does patient use assisted devices?: Yes  Does patient currently own DME?: Yes  What DME does the patient currently own?: Straight Cane,Walker,Rollator,Shower Chair  Does patient have a history of Outpatient Therapy (PT/OT)?: Yes  Does the patient have a history of Short-Term Rehab?: Yes ([facility]; [facility])  Does patient have a history of HHC?: Yes  Does patient currently have [facility]?: No     Patient Information Continued  Income Source: Pension/half-way  Does patient have prescription coverage?: Yes  Within the past 12 months, you worried that your food would run out before you got the money to buy more : Never true  Within the past 12 months, the food you bought just didnt last and you didnt have money to get more : Never true  Food insecurity resource given?: N/A  Does patient receive dialysis treatments?: No  Does patient have a history of substance abuse?: No  Does patient have a history of Mental Health Diagnosis?: Yes (Mild depression)  Has patient received inpatient treatment related to mental health in the last 2 years?: No     Means of Transportation  Means of Transport to Appts[de-identified] Drives Self (Has been an on-going issue as pt continues to refuse to give up his license despite concerns with this cognition  OP CM working on [employer] application )  In the past 12 months, has lack of transportation kept you from medical appointments or from getting medications?: No  In the past 12 months, has lack of transportation kept you from meetings, work, or from getting things needed for daily living?: No  Was application for public transport provided?: N/A    DISCHARGE DETAILS:    Discharge planning discussed with[de-identified] Patient, Dtr Hodan Ramal of Choice: Yes  Comments - Freedom of Choice: SW reviewed SNF listing with patient  He chose #1 [facility] and #2 [facility]  Patient would not like referrals to the SNFs in [place]    CM contacted family/caregiver?: Yes  Were Treatment Team discharge recommendations reviewed with patient/caregiver?: Yes  Did patient/caregiver verbalize understanding of patient care needs?: Yes  Were patient/caregiver advised of the risks associated with not following Treatment Team discharge recommendations?: Yes    Contacts  Patient Contacts: Karla Anderson  Relationship to Patient[de-identified] Family  Contact Method: In Person  Reason/Outcome: Discharge Planning,Continuity of Care,Emergency Contact    Other Referral/Resources/Interventions Provided:  Interventions: Short Term Rehab    Treatment Team Recommendation: Short Term Rehab  Discharge Destination Plan[de-identified] Short Term Rehab    SW met with patient to introduce role, complete assessment, and discuss discharge plan  Patient is recommended for STR at this time by PT/OT  Patient agreed that he would benefit and chose for referrals to be sent to [facility] as he has been there before  SW spoke with patient's dtr Yamel Esposito regarding  She is also in agreement with recommendation and plan  GAY reached out to OP MITUL VERA  To advise of DCP  [facility] referrals sent and pending

## 2022-05-03 ENCOUNTER — PATIENT OUTREACH (OUTPATIENT)
Dept: FAMILY MEDICINE CLINIC | Facility: CLINIC | Age: 77
End: 2022-05-03

## 2022-05-03 LAB
BASOPHILS # BLD MANUAL: 0 THOUSAND/UL (ref 0–0.1)
BASOPHILS NFR MAR MANUAL: 0 % (ref 0–1)
EOSINOPHIL # BLD MANUAL: 0 THOUSAND/UL (ref 0–0.4)
EOSINOPHIL NFR BLD MANUAL: 0 % (ref 0–6)
ERYTHROCYTE [DISTWIDTH] IN BLOOD BY AUTOMATED COUNT: 13.8 % (ref 11.6–15.1)
HCT VFR BLD AUTO: 40.3 % (ref 36.5–49.3)
HGB BLD-MCNC: 13.6 G/DL (ref 12–17)
LYMPHOCYTES # BLD AUTO: 37 % (ref 14–44)
LYMPHOCYTES # BLD AUTO: 4.49 THOUSAND/UL (ref 0.6–4.47)
MCH RBC QN AUTO: 32.1 PG (ref 26.8–34.3)
MCHC RBC AUTO-ENTMCNC: 33.7 G/DL (ref 31.4–37.4)
MCV RBC AUTO: 95 FL (ref 82–98)
MONOCYTES # BLD AUTO: 0.36 THOUSAND/UL (ref 0–1.22)
MONOCYTES NFR BLD: 3 % (ref 4–12)
NEUTROPHILS # BLD MANUAL: 6.31 THOUSAND/UL (ref 1.85–7.62)
NEUTS SEG NFR BLD AUTO: 52 % (ref 43–75)
PLATELET # BLD AUTO: 117 THOUSANDS/UL (ref 149–390)
PLATELET BLD QL SMEAR: ABNORMAL
PMV BLD AUTO: 9.6 FL (ref 8.9–12.7)
RBC # BLD AUTO: 4.24 MILLION/UL (ref 3.88–5.62)
RBC MORPH BLD: NORMAL
SMUDGE CELLS BLD QL SMEAR: PRESENT
VARIANT LYMPHS # BLD AUTO: 8 %
WBC # BLD AUTO: 12.13 THOUSAND/UL (ref 4.31–10.16)

## 2022-05-03 PROCEDURE — 4A023N8 MEASUREMENT OF CARDIAC SAMPLING AND PRESSURE, BILATERAL, PERCUTANEOUS APPROACH: ICD-10-PCS | Performed by: INTERNAL MEDICINE

## 2022-05-03 PROCEDURE — 97110 THERAPEUTIC EXERCISES: CPT

## 2022-05-03 PROCEDURE — 85007 BL SMEAR W/DIFF WBC COUNT: CPT

## 2022-05-03 PROCEDURE — 99152 MOD SED SAME PHYS/QHP 5/>YRS: CPT | Performed by: INTERNAL MEDICINE

## 2022-05-03 PROCEDURE — 99232 SBSQ HOSP IP/OBS MODERATE 35: CPT | Performed by: INTERNAL MEDICINE

## 2022-05-03 PROCEDURE — C1769 GUIDE WIRE: HCPCS | Performed by: INTERNAL MEDICINE

## 2022-05-03 PROCEDURE — C1894 INTRO/SHEATH, NON-LASER: HCPCS | Performed by: INTERNAL MEDICINE

## 2022-05-03 PROCEDURE — 93460 R&L HRT ART/VENTRICLE ANGIO: CPT | Performed by: INTERNAL MEDICINE

## 2022-05-03 PROCEDURE — C1751 CATH, INF, PER/CENT/MIDLINE: HCPCS | Performed by: INTERNAL MEDICINE

## 2022-05-03 PROCEDURE — 94762 N-INVAS EAR/PLS OXIMTRY CONT: CPT

## 2022-05-03 PROCEDURE — 85027 COMPLETE CBC AUTOMATED: CPT

## 2022-05-03 PROCEDURE — 99153 MOD SED SAME PHYS/QHP EA: CPT | Performed by: INTERNAL MEDICINE

## 2022-05-03 PROCEDURE — NC001 PR NO CHARGE: Performed by: INTERNAL MEDICINE

## 2022-05-03 PROCEDURE — 94640 AIRWAY INHALATION TREATMENT: CPT

## 2022-05-03 PROCEDURE — 99232 SBSQ HOSP IP/OBS MODERATE 35: CPT | Performed by: FAMILY MEDICINE

## 2022-05-03 PROCEDURE — B2111ZZ FLUOROSCOPY OF MULTIPLE CORONARY ARTERIES USING LOW OSMOLAR CONTRAST: ICD-10-PCS | Performed by: INTERNAL MEDICINE

## 2022-05-03 PROCEDURE — 94760 N-INVAS EAR/PLS OXIMETRY 1: CPT

## 2022-05-03 RX ORDER — FENTANYL CITRATE 50 UG/ML
INJECTION, SOLUTION INTRAMUSCULAR; INTRAVENOUS AS NEEDED
Status: DISCONTINUED | OUTPATIENT
Start: 2022-05-03 | End: 2022-05-03 | Stop reason: HOSPADM

## 2022-05-03 RX ORDER — METHYLPREDNISOLONE SODIUM SUCCINATE 40 MG/ML
40 INJECTION, POWDER, LYOPHILIZED, FOR SOLUTION INTRAMUSCULAR; INTRAVENOUS EVERY 12 HOURS SCHEDULED
Status: DISCONTINUED | OUTPATIENT
Start: 2022-05-03 | End: 2022-05-04 | Stop reason: HOSPADM

## 2022-05-03 RX ORDER — LIDOCAINE HYDROCHLORIDE 10 MG/ML
INJECTION, SOLUTION EPIDURAL; INFILTRATION; INTRACAUDAL; PERINEURAL AS NEEDED
Status: DISCONTINUED | OUTPATIENT
Start: 2022-05-03 | End: 2022-05-03 | Stop reason: HOSPADM

## 2022-05-03 RX ORDER — VERAPAMIL HCL 2.5 MG/ML
AMPUL (ML) INTRAVENOUS AS NEEDED
Status: DISCONTINUED | OUTPATIENT
Start: 2022-05-03 | End: 2022-05-03 | Stop reason: HOSPADM

## 2022-05-03 RX ORDER — NITROGLYCERIN 20 MG/100ML
INJECTION INTRAVENOUS AS NEEDED
Status: DISCONTINUED | OUTPATIENT
Start: 2022-05-03 | End: 2022-05-03 | Stop reason: HOSPADM

## 2022-05-03 RX ORDER — HEPARIN SODIUM 1000 [USP'U]/ML
INJECTION, SOLUTION INTRAVENOUS; SUBCUTANEOUS AS NEEDED
Status: DISCONTINUED | OUTPATIENT
Start: 2022-05-03 | End: 2022-05-03 | Stop reason: HOSPADM

## 2022-05-03 RX ORDER — MIDAZOLAM HYDROCHLORIDE 2 MG/2ML
INJECTION, SOLUTION INTRAMUSCULAR; INTRAVENOUS AS NEEDED
Status: DISCONTINUED | OUTPATIENT
Start: 2022-05-03 | End: 2022-05-03 | Stop reason: HOSPADM

## 2022-05-03 RX ADMIN — FINASTERIDE 5 MG: 5 TABLET, FILM COATED ORAL at 08:10

## 2022-05-03 RX ADMIN — IPRATROPIUM BROMIDE AND ALBUTEROL SULFATE 3 ML: 2.5; .5 SOLUTION RESPIRATORY (INHALATION) at 12:25

## 2022-05-03 RX ADMIN — GABAPENTIN 300 MG: 300 CAPSULE ORAL at 08:10

## 2022-05-03 RX ADMIN — METHYLPREDNISOLONE SODIUM SUCCINATE 40 MG: 40 INJECTION, POWDER, FOR SOLUTION INTRAMUSCULAR; INTRAVENOUS at 21:56

## 2022-05-03 RX ADMIN — PANTOPRAZOLE SODIUM 40 MG: 20 TABLET, DELAYED RELEASE ORAL at 16:40

## 2022-05-03 RX ADMIN — FORMOTEROL FUMARATE DIHYDRATE 20 MCG: 20 SOLUTION RESPIRATORY (INHALATION) at 07:16

## 2022-05-03 RX ADMIN — AMLODIPINE BESYLATE 10 MG: 10 TABLET ORAL at 08:10

## 2022-05-03 RX ADMIN — PANTOPRAZOLE SODIUM 40 MG: 20 TABLET, DELAYED RELEASE ORAL at 08:10

## 2022-05-03 RX ADMIN — FORMOTEROL FUMARATE DIHYDRATE 20 MCG: 20 SOLUTION RESPIRATORY (INHALATION) at 19:44

## 2022-05-03 RX ADMIN — METOPROLOL TARTRATE 25 MG: 25 TABLET, FILM COATED ORAL at 08:10

## 2022-05-03 RX ADMIN — GABAPENTIN 300 MG: 300 CAPSULE ORAL at 16:40

## 2022-05-03 RX ADMIN — CARBIDOPA AND LEVODOPA 1 TABLET: 25; 100 TABLET ORAL at 08:10

## 2022-05-03 RX ADMIN — IPRATROPIUM BROMIDE AND ALBUTEROL SULFATE 3 ML: 2.5; .5 SOLUTION RESPIRATORY (INHALATION) at 07:15

## 2022-05-03 RX ADMIN — MIRTAZAPINE 15 MG: 15 TABLET, FILM COATED ORAL at 21:57

## 2022-05-03 RX ADMIN — GABAPENTIN 300 MG: 300 CAPSULE ORAL at 21:57

## 2022-05-03 RX ADMIN — BUSPIRONE HYDROCHLORIDE 10 MG: 10 TABLET ORAL at 21:56

## 2022-05-03 RX ADMIN — CHOLESTYRAMINE 4 G: 4 POWDER, FOR SUSPENSION ORAL at 08:10

## 2022-05-03 RX ADMIN — METOPROLOL TARTRATE 25 MG: 25 TABLET, FILM COATED ORAL at 21:56

## 2022-05-03 RX ADMIN — TAMSULOSIN HYDROCHLORIDE 0.4 MG: 0.4 CAPSULE ORAL at 08:10

## 2022-05-03 RX ADMIN — IPRATROPIUM BROMIDE AND ALBUTEROL SULFATE 3 ML: 2.5; .5 SOLUTION RESPIRATORY (INHALATION) at 19:30

## 2022-05-03 RX ADMIN — BUDESONIDE 0.5 MG: 0.5 INHALANT ORAL at 19:30

## 2022-05-03 RX ADMIN — ENOXAPARIN SODIUM 40 MG: 40 INJECTION SUBCUTANEOUS at 08:10

## 2022-05-03 RX ADMIN — BUDESONIDE 0.5 MG: 0.5 INHALANT ORAL at 07:17

## 2022-05-03 RX ADMIN — BUSPIRONE HYDROCHLORIDE 10 MG: 10 TABLET ORAL at 16:40

## 2022-05-03 RX ADMIN — BUSPIRONE HYDROCHLORIDE 10 MG: 10 TABLET ORAL at 08:10

## 2022-05-03 RX ADMIN — CARBIDOPA AND LEVODOPA 1 TABLET: 25; 100 TABLET ORAL at 16:40

## 2022-05-03 NOTE — PROGRESS NOTES
Progress Note - Cardiology   [facility]     Ryan Najera 65 y o  male MRN: [id number]  : 1945  Unit/Bed#: [address] [place] [address] Encounter: [id number]    Assessment and Plan:   1  COPD with history of alpha-1 antitrypsin deficiency:  Current symptoms appear to be out of proportion with clinical findings  -  for right heart catheterization to evaluate pulmonary pressures    -  continue inhalers as per Pulmonary    2  Hypertension:  Orthostatic blood pressures yesterday were negative  -  will change Coreg to Lopressor 25 mg b i d     -  consider decreasing Norvasc to 5 mg an adding low-dose Ace or ARB    -  continue to monitor blood pressures  3  Chest pressure: Occurs with activity:  High sensitivity troponins mildly abnormal on admission,    -  for left heart catheterization today to evaluate coronary anatomy    4  Previous history of orthostatic hypotension:  Midodrine held on admission due to hypertension    5  Parkinson's disease    Subjective / Objective:   Patient seen and examined  No change in his symptomatology from yesterday  Still with profound shortness of breath with any type of physical activity  He is for complete heart catheterization today for further evaluation  Vitals: Blood pressure 140/80, pulse (!) 50, temperature 97 8 °F (36 6 °C), resp  rate 16, height 6' 5" (1 956 m), weight 80 3 kg (177 lb), SpO2 99 %  Vitals:    22 1453 22 0952   Weight: 80 5 kg (177 lb 7 5 oz) 80 3 kg (177 lb)     Body mass index is 20 99 kg/m²  BP Readings from Last 3 Encounters:   22 140/80   22 (!) 174/98   22 (!) 200/98     Orthostatic Blood Pressures      Most Recent Value   Blood Pressure 140/80 filed at 2022 0900   Patient Position - Orthostatic VS Lying filed at 2022 2354        I/O       / 07 0700  0701   0700  0701  05/ 0700    P  O  120 120     I V  (mL/kg) 10 (0 1) 10 (0 1)     Total Intake(mL/kg) 130 (1 6) 130 (1 6)     Urine (mL/kg/hr) 1750 (0 9) 1550 (0 8)     Stool  0     Total Output 1750 1550     Net -1620 -1420            Unmeasured Stool Occurrence  1 x         Invasive Devices  Report    Peripheral Intravenous Line            Peripheral IV 05/02/22 Right;Ventral (anterior) Forearm <1 day                  Intake/Output Summary (Last 24 hours) at 5/3/2022 0939  Last data filed at 5/3/2022 0514  Gross per 24 hour   Intake 130 ml   Output 1550 ml   Net -1420 ml         Physical Exam:   Physical Exam  Vitals and nursing note reviewed  Constitutional:       General: He is not in acute distress  Appearance: Normal appearance  He is normal weight  He is ill-appearing (Chronically)  HENT:      Right Ear: External ear normal       Left Ear: External ear normal    Eyes:      General: No scleral icterus  Right eye: No discharge  Left eye: No discharge  Cardiovascular:      Rate and Rhythm: Normal rate and regular rhythm  Pulses: Normal pulses  Heart sounds: Normal heart sounds  Pulmonary:      Effort: Pulmonary effort is normal  No accessory muscle usage or respiratory distress  Breath sounds: Examination of the right-lower field reveals decreased breath sounds  Examination of the left-lower field reveals decreased breath sounds  Decreased breath sounds present  No wheezing  Abdominal:      General: Bowel sounds are normal  There is no distension  Musculoskeletal:      Right lower leg: No edema  Left lower leg: No edema  Skin:     General: Skin is warm and dry  Capillary Refill: Capillary refill takes less than 2 seconds  Neurological:      General: No focal deficit present  Mental Status: He is alert and oriented to person, place, and time  Mental status is at baseline     Psychiatric:         Mood and Affect: Mood normal                    Medications/ Allergies:     Current Facility-Administered Medications   Medication Dose Route Frequency Provider Last Rate    acetaminophen  650 mg Oral Q6H PRN Rachel Gutierrez MD      albuterol  2 puff Inhalation Q4H PRN Rachel Gutierrez MD      amLODIPine  10 mg Oral Daily Rachel Gutierrez [place]      budesonide  0 5 mg Nebulization BID Rachel Gutierrez MD      busPIRone  10 mg Oral TID Rachel Gutierrez MD      carbidopa-levodopa  1 tablet Oral TID With Meals Rachel Gutierrez MD      cholestyramine sugar free  4 g Oral Daily Rachel Gutierrez MD      enoxaparin  40 mg Subcutaneous Daily Rachel Gutierrez MD      finasteride  5 mg Oral QAM Rachel Gutierrez MD      fluticasone  2 spray Nasal Daily Rachel Gutierrez [place]      formoterol  20 mcg Nebulization Q12H Amadou Galicia PA-C      gabapentin  300 mg Oral TID Rachel Gutierrez MD      ipratropium-albuterol  3 mL Nebulization 4x Daily Rachel Gutierrez MD      metoprolol tartrate  25 mg Oral Q12H TOPHER Davis      mirtazapine  15 mg Oral HS Rachel Gutierrez MD      pantoprazole  40 mg Oral BID Rachel Gutierrez MD      tamsulosin  0 4 mg Oral Daily Rachel Gutierrez MD       acetaminophen, 650 mg, Q6H PRN  albuterol, 2 puff, Q4H PRN      Allergies   Allergen Reactions    Chantix [Varenicline]      Caused prostate infection       VTE Pharmacologic Prophylaxis:   Sequential compression device (Venodyne)     Labs:   Troponins:  Results from last 7 days   Lab Units 05/01/22  1339 04/28/22  1618 04/28/22  1340   HSTNI D2 ng/L 0  --    < >   HSTNI D4 ng/L  --  -8  --     < > = values in this interval not displayed       CBC with diff:  Results from last 7 days   Lab Units 05/03/22  0519 05/02/22  0434 05/01/22  0527 04/30/22  0600 04/29/22  0624 04/28/22  1122   WBC Thousand/uL 12 13* 11 41* 11 88* 7 87 11 57* 6 32   HEMOGLOBIN g/dL 13 6 14 0 13 7 14 4 14 3 14 7   HEMATOCRIT % 40 3 41 5 41 1 42 8 41 6 44 1   MCV fL 95 95 95 94 94 94   PLATELETS Thousands/uL 117* 131* 136* 145* 132* 118*   MCH pg 32 1 32 1 31 5 31 6 32 3 31 4   MCHC g/dL 33 7 33 7 33 3 33 6 34 4 33 3   RDW % 13 8 13 6 13 6 13 6 13 6 13 9   MPV fL 9 6 9 3 9 3 8 7* 8 7* 9 0   NRBC AUTO /100 WBCs  --  0 0  --  0 0     CMP:  Results from last 7 days   Lab Units 05/02/22  0434 05/01/22  0527 04/30/22  0600 04/29/22  0512 04/28/22  1122   SODIUM mmol/L 140 138 138 139 141   POTASSIUM mmol/L 4 5 4 3 4 4 4 4 4 2   CHLORIDE mmol/L 106 105 104 103 103   CO2 mmol/L 28 24 26 27 30   ANION GAP mmol/L 6 9 8 9 8   BUN mg/dL 28* 27* 23 23 21   CREATININE mg/dL 1 11 1 04 1 08 1 07 1 23   CALCIUM mg/dL 8 4 8 7 8 9 9 3 9 0   AST U/L  --   --  16  --  25   ALT U/L  --   --  37  --  45   ALK PHOS U/L  --   --  73  --  76   TOTAL PROTEIN g/dL  --   --  6 4  --  6 7   ALBUMIN g/dL  --   --  3 4*  --  3 9   TOTAL BILIRUBIN mg/dL  --   --  0 79  --  1 21*   EGFR ml/min/1 73sq m 63 68 65 66 55     Magnesium:  Results from last 7 days   Lab Units 04/28/22  1122   MAGNESIUM mg/dL 2 0     Coags:  Results from last 7 days   Lab Units 04/28/22  1122   PTT seconds 29   INR  1 10       Imaging & Testing   I have personally reviewed pertinent reports  XR chest 1 view portable    Result Date: 4/28/2022  Narrative: CHEST INDICATION:   short of breath  COMPARISON:  Chest radiograph dated 4/21/2022  EXAM PERFORMED/VIEWS:  XR CHEST PORTABLE FINDINGS: Cardiomediastinal silhouette appears unremarkable  No focal airspace consolidation  No pneumothorax or pleural effusion  Osseous structures appear within normal limits for patient age  Impression: No acute cardiopulmonary disease  Workstation performed: [id number]     XR chest 1 view portable    Result Date: 4/21/2022  Narrative: CHEST INDICATION:   sob  COMPARISON:  11/17/2021 EXAM PERFORMED/VIEWS:  XR CHEST PORTABLE FINDINGS: Heart shadow appears unremarkable  Atherosclerotic vascular calcifications are noted  The lungs are clear  No pneumothorax or pleural effusion   Osseous structures appear within normal limits for patient age  Impression: No acute cardiopulmonary disease  Workstation performed: [id number]     CTA ED chest PE Study    Result Date: 4/28/2022  Narrative: CTA - CHEST WITH IV CONTRAST - PULMONARY ANGIOGRAM INDICATION:   chest pain shortness of breath  COMPARISON: 11/17/2021 TECHNIQUE: CTA examination of the chest was performed using angiographic technique according to a protocol specifically tailored to evaluate for pulmonary embolism  Axial, sagittal, and coronal 2D reformatted images were created from the source data and  submitted for interpretation  In addition, coronal 3D MIP postprocessing was performed on the acquisition scanner  Radiation dose length product (DLP) for this visit:  534 84 mGy-cm   This examination, like all CT scans performed in the [facility], was performed utilizing techniques to minimize radiation dose exposure, including the use of iterative  reconstruction and automated exposure control  IV Contrast:  85 mL of iohexol (OMNIPAQUE)  FINDINGS: PULMONARY ARTERIAL TREE:  There is no evidence of acute pulmonary embolism  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 extending into the lower lobe noted, unchanged since 11/17/2021  LUNGS:  Pulmonary emphysema  Areas of scarring in the lungs particularly the right lower lobe  No evidence of acute infiltrate PLEURA:  Trace bilateral effusions  No pneumothorax HEART/GREAT VESSELS:  No pericardial effusion  Coronary artery calcification  No thoracic aortic aneurysm  MEDIASTINUM AND MIKE:  Unremarkable  CHEST WALL AND LOWER NECK:   Unremarkable  VISUALIZED STRUCTURES IN THE UPPER ABDOMEN:  Unremarkable  OSSEOUS STRUCTURES:  No acute fracture or destructive osseous lesion  Impression: 1  No evidence of acute pulmonary embolism  2  Pulmonary emphysema and parenchymal scarring without evidence of acute infiltrate 3   Trace bilateral effusions Workstation performed: [id number]        EKG / Monitor: Personally reviewed  Sinus rhythm      Jacob Burns, [facility]  Cardiology      "This note has been constructed using a voice recognition system  Therefore there may be syntax, spelling, and/or grammatical errors   Please call if you have any questions  "

## 2022-05-03 NOTE — PLAN OF CARE
Problem: Potential for Falls  Goal: Patient will remain free of falls  Description: INTERVENTIONS:  - Educate patient/family on patient safety including physical limitations  - Instruct patient to call for assistance with activity   - Consult OT/PT to assist with strengthening/mobility   - Keep Call bell within reach  - Keep bed low and locked with side rails adjusted as appropriate  - Keep care items and personal belongings within reach  - Initiate and maintain comfort rounds  - Make Fall Risk Sign visible to staff  - Offer Toileting every 2 Hours, in advance of need  - Initiate/Maintain bed alarm  - Obtain necessary fall risk management equipment: call bell, walker  - Apply yellow socks and bracelet for high fall risk patients  - Consider moving patient to room near nurses station  Outcome: Progressing     Problem: RESPIRATORY - ADULT  Goal: Achieves optimal ventilation and oxygenation  Description: INTERVENTIONS:  - Assess for changes in respiratory status  - Assess for changes in mentation and behavior  - Position to facilitate oxygenation and minimize respiratory effort  - Oxygen administered by appropriate delivery if ordered  - Initiate smoking cessation education as indicated  - Encourage broncho-pulmonary hygiene including cough, deep breathe, Incentive Spirometry  - Assess the need for suctioning and aspirate as needed  - Assess and instruct to report SOB or any respiratory difficulty  - Respiratory Therapy support as indicated  Outcome: Progressing     Problem: Prexisting or High Potential for Compromised Skin Integrity  Goal: Skin integrity is maintained or improved  Description: INTERVENTIONS:  - Identify patients at risk for skin breakdown  - Assess and monitor skin integrity  - Assess and monitor nutrition and hydration status  - Monitor labs   - Assess for incontinence   - Turn and reposition patient  - Assist with mobility/ambulation  - Relieve pressure over bony prominences  - Avoid friction and shearing  - Provide appropriate hygiene as needed including keeping skin clean and dry  - Evaluate need for skin moisturizer/barrier cream  - Collaborate with interdisciplinary team   - Patient/family teaching  - Consider wound care consult   Outcome: Progressing

## 2022-05-03 NOTE — PROGRESS NOTES
[facility] Practice Progress Note - Severiano Lazier 65 y o  male MRN: [id number]    Unit/Bed#: [address] [place] [address] Encounter: [id number]      Assessment/Plan:  * COPD with acute exacerbation [facility])  Assessment & Plan  Patient with a history of AAT deficiency and centrolobular emphysema presenting with SOB of 2-3 days  On home O2 2L and reports that he has had to increase O2 to 5L  125mg solumedrol given in ED   Trop neg    CTA: No evidence of acute PE  Pulmonary emphysema and parenchymal scarring without evidence of acute infiltrate  Trace bilateral effusions   CXR:  No acute cardiopulmonary disease    · Solu-Medrol discontinued yesterday  · Continuing home DuoNebs 3 mL 4 X daily, fluticasone, budesonide 0 5 mg BID, albuterol Q4 hrs PRN  · Continuous pulse oximetry  · Spirometry  · O2 supplementation, tapering as needed  · Pulmonology consulted, appreciate recommendations  · Patient receives weekly Zemaira injections, will order when indicated if patient remains in hospital  · Patient is not improved, will consult Cardiology  Patient going for cardiac catheterization today  Chest heaviness  Assessment & Plan  Will order troponin reflex to rule out cardiac component  · Follow-up troponins  · EKG    Hypertensive urgency  Assessment & Plan  BP on arrival 223/98, no signs of end-organ damage at this time  Patient states that he is unable to take his medications today  · Continue Norvasc 10 mg PO daily  · Home midodrine held in the setting of uncontrolled BP  · Continue to monitor vital signs  · Will optimize medications as needed    Parkinson's disease [facility])  Assessment & Plan  Patient presented to the ED in respiratory distress  Possible component of COPD and worsening of his parkinson's versus carbidopa levodopa medication washout  He endorses compliance with meds  Will continue to evaluate  Followed and managed by [facility] Neurology office  · Continue carbidopa levodopa  mg p o  TID    Anxiousness  Assessment & Plan  Stable,  · Continue BuSpar 10 mg PO TID  · Continue lorazepam 1 mg PO BID PRN    Depression, recurrent (HCC)  Assessment & Plan  Stable  · Continue mirtazapine 15 mg PO HS    Orthostatic hypotension with spine hypertension  Assessment & Plan  BP on arrival 223/98  · Continue hypertensive medications  · Home midodrine held in the setting of hypertensive emergency    Peripheral neuropathy  Assessment & Plan  Stable  · Continue gabapentin    Chronic diarrhea   Assessment & Plan  Stable  · Continue cholestyramine    Ambulatory dysfunction  Assessment & Plan  Chronic  · Fall precautions  · PT/OT    Insomnia  Assessment & Plan  Stable  · Continue home trazodone and mirtazapine     Centrilobular emphysema ([address] )  Assessment & Plan  Patient seen by pulmonology outpatient yesterday, has been having respiratory distress for several days  Was given Solu-Medrol yesterday with a 5 day course of steroid taper  Presented to the ED with ongoing progressively worsening shortness of breath and accessory muscle use  He has history of emphysema, alpha-1 antitrypsin deficiency  · See under COPD exacerbation    BPH (benign prostatic hyperplasia)  Assessment & Plan  Stable  · Continue Flomax and finasteride    Acute and chronic respiratory failure with hypoxia (HCC)  Assessment & Plan  Acute on chronic, improving    Acute and chronic respiratory failure with hypoxia due to COPD exacerbation, as evidenced by respiratory distress, accessory muscle use, tachypnea on admission  Treated with BiPaP in ED, currently oxygenating well on 4L NC  · See plan under COPD exacerbation    Essential hypertension  Assessment & Plan  BP on admission  · Continue Norvasc 10 mg POD  · Home midodrine held in setting of hypertensive urgency, will restart where appropriate    Gastroesophageal reflux disease  Assessment & Plan  Stable  · Continue home Protonix 40 mg b i d      Alpha-1-antitrypsin deficiency [facility])  Assessment & Plan  · On Zemaira 1x week  · Will order when indicated if patient remains in hospital          Subjective:   Patient reports feeling well  Denies shortness of breath today  Undergoing cardiac catheterization today  Objective:     Vitals: Blood pressure 140/67, pulse 72, temperature 98 2 °F (36 8 °C), temperature source Oral, resp  rate 20, height 6' 5" (1 956 m), weight 80 3 kg (177 lb), SpO2 98 %  ,Body mass index is 20 99 kg/m²  Wt Readings from Last 3 Encounters:   05/02/22 80 3 kg (177 lb)   04/27/22 80 9 kg (178 lb 6 4 oz)   04/21/22 81 kg (178 lb 9 2 oz)       Intake/Output Summary (Last 24 hours) at 5/3/2022 0902  Last data filed at 5/3/2022 0514  Gross per 24 hour   Intake 130 ml   Output 1550 ml   Net -1420 ml       Physical Exam:   General:  Alert and awake  Heart:  Regular rate and rhythm  Lungs:  Clear to auscultation x2      Recent Results (from the past 24 hour(s))   Echo complete w/ contrast if indicated    Collection Time: 05/02/22 10:36 AM   Result Value Ref Range    LA size 3 3 cm    Tricuspid valve peak regurgitation velocity 3 19 m/s    LVPWd 1 10 0 53 - 1 00 cm    Left Atrium Area-systolic Apical Two Chamber 15 1 cm2    Left Atrium Area-systolic Four Chamber 88 8 cm2    MV E' Tissue Velocity Septal 9 cm/s    MV E' Tissue Velocity Lateral 7 cm/s    IVSd 4 03 cm    LV DIASTOLIC VOLUME (MOD BIPLANE) 2D 60 mL    LEFT VENTRICLE SYSTOLIC VOLUME (MOD BIPLANE) 2D 36 mL    Left ventricular stroke volume (2D) 25 00 mL    A4C EF 41 %    LA length (A2C) 4 70 cm    LVIDd 3 80 5 22 - 7 77 cm    IVS 1 0 54 - 1 01 cm    LVIDS 3 00 3 17 - 4 79 cm    FS 21 28 - 44 %    Ao root 4 00 cm    RVID d 3 1 cm    AV LVOT peak gradient 5 mmHg    MV valve area p 1/2 method 3 14 cm2    E wave deceleration time 240 ms    LVOT diameter 2 1 cm    AV peak gradient 11 mmHg    MV Peak E Myron 64 cm/s    MV Peak A Myron 1 04 m/s    LEVY A4C 15 3 cm2    RAA A4C 25 8 cm2    MV stenosis pressure 1/2 time 70 ms LVSV, 2D 25 mL    LVOT area 3 46 cm2    ZLVPWD 2 79     ZLVIDS -2 06     ZLVIDD -5 16     ZIVSD 1 85    CBC and differential    Collection Time: 05/03/22  5:19 AM   Result Value Ref Range    WBC 12 13 (H) 4 31 - 10 16 Thousand/uL    RBC 4 24 3 88 - 5 62 Million/uL    Hemoglobin 13 6 12 0 - 17 0 g/dL    Hematocrit 40 3 36 5 - 49 3 %    MCV 95 82 - 98 fL    MCH 32 1 26 8 - 34 3 pg    MCHC 33 7 31 4 - 37 4 g/dL    RDW 13 8 11 6 - 15 1 %    MPV 9 6 8 9 - 12 7 fL    Platelets 079 (L) 600 - 390 Thousands/uL   Manual Differential(PHLEBS Do Not Order)    Collection Time: 05/03/22  5:19 AM   Result Value Ref Range    Segmented % 52 43 - 75 %    Lymphocytes % 37 14 - 44 %    Monocytes % 3 (L) 4 - 12 %    Eosinophils, % 0 0 - 6 %    Basophils % 0 0 - 1 %    Atypical Lymphocytes % 8 (H) <=0 %    Absolute Neutrophils 6 31 1 85 - 7 62 Thousand/uL    Lymphocytes Absolute 4 49 (H) 0 60 - 4 47 Thousand/uL    Monocytes Absolute 0 36 0 00 - 1 22 Thousand/uL    Eosinophils Absolute 0 00 0 00 - 0 40 Thousand/uL    Basophils Absolute 0 00 0 00 - 0 10 Thousand/uL    Total Counted      Smudge Cells Present     RBC Morphology Normal     Platelet Estimate Borderline (A) Adequate       Current Facility-Administered Medications   Medication Dose Route Frequency Provider Last Rate Last Admin    acetaminophen (TYLENOL) tablet 650 mg  650 mg Oral Q6H PRN Dominique Hernandez MD   650 mg at 04/29/22 2235    albuterol (PROVENTIL HFA,VENTOLIN HFA) inhaler 2 puff  2 puff Inhalation Q4H PRN Dominique Hernandez MD        amLODIPine (NORVASC) tablet 10 mg  10 mg Oral Daily Dominique Hernandez MD   10 mg at 05/03/22 0810    budesonide (PULMICORT) inhalation solution 0 5 mg  0 5 mg Nebulization BID Dominique Hernandez MD   0 5 mg at 05/03/22 0717    busPIRone (BUSPAR) tablet 10 mg  10 mg Oral TID Dominique Hernandez MD   10 mg at 05/03/22 0810    carbidopa-levodopa (SINEMET)  mg per tablet 1 tablet  1 tablet Oral TID With Meals [place] Edwige Myers MD   1 tablet at 05/03/22 0810    cholestyramine sugar free (QUESTRAN LIGHT) packet 4 g  4 g Oral Daily Jax Kaur MD   4 g at 05/03/22 0810    enoxaparin (LOVENOX) subcutaneous injection 40 mg  40 mg Subcutaneous Daily Jax Kaur MD   40 mg at 05/03/22 0810    finasteride (PROSCAR) tablet 5 mg  5 mg Oral QAM Jax Kaur MD   5 mg at 05/03/22 0810    fluticasone (FLONASE) 50 mcg/act nasal spray 2 spray  2 spray Nasal Daily Jax Kaur MD   2 spray at 05/02/22 0392    formoterol (PERFOROMIST) nebulizer solution 20 mcg  20 mcg Nebulization Q12H Marcelo Samuel PA-C   20 mcg at 05/03/22 0716    gabapentin (NEURONTIN) capsule 300 mg  300 mg Oral TID Jax Kaur MD   300 mg at 05/03/22 0810    ipratropium-albuterol (DUO-NEB) 0 5-2 5 mg/3 mL inhalation solution 3 mL  3 mL Nebulization 4x Daily Jax Kaur MD   3 mL at 05/03/22 0715    metoprolol tartrate (LOPRESSOR) tablet 25 mg  25 mg Oral Q12H [address] Lauren Hoots, CRVANNESA   25 mg at 05/03/22 0810    mirtazapine (REMERON) tablet 15 mg  15 mg Oral HS Jax Kaur MD   15 mg at 05/02/22 2118    pantoprazole (PROTONIX) EC tablet 40 mg  40 mg Oral BID Jax Kaur MD   40 mg at 05/03/22 0810    tamsulosin (FLOMAX) capsule 0 4 mg  0 4 mg Oral Daily Jax Kaur MD   0 4 mg at 05/03/22 0810       Invasive Devices  Report    Peripheral Intravenous Line            Peripheral IV 05/02/22 Right;Ventral (anterior) Forearm <1 day                Lab, Imaging and other studies: I have personally reviewed pertinent reports      VTE Pharmacologic Prophylaxis: Enoxaparin (Lovenox)  VTE Mechanical Prophylaxis: sequential compression device    Carol Shaffer MD

## 2022-05-03 NOTE — PROGRESS NOTES
Chart Review  05/03/2022    Patient had a procedure today 05/03/2022, will contact on 05/05/2022 to reschedule our HV we had cancelled due to patient being in the ED      Next outreach is scheduled for 05/05/2022

## 2022-05-03 NOTE — PROGRESS NOTES
Progress Note - Pulmonary Medicine  Alysia Giordano 65 y o  male MRN: [id number]  Unit/Bed#: [address] [place] [address] Encounter: [id number]  Code Status: Level 1 - Full Code    Assessment/Plan:    William Vargas is a 65 y o  Past Medical History:   Diagnosis Date    Anesthesia complication     Difficult to wake up    Arthritis     BPH (benign prostatic hyperplasia)     urinary frequency    Cancer (HCC)     basal cell neck, face    COPD (chronic obstructive pulmonary disease) (HCC)     COPD exacerbation ([facility] ) 12/29/2019    Full dentures     Hiatal hernia     History of methicillin resistant staphylococcus aureus (MRSA)     10/11/2018 MRSA (nares) positive    Hypertension     Irritable bowel syndrome     Kidney stone     at least 7 episodes    Liver disease     Alpha 1- enzyme deficiency - diagnosed 2002   has been on weekly replacement therapy since then    [facility]     1/25/15  FEV1 - 2 45 liters or 59% of predicted    RSV infection 12/2017    Wears glasses     for driving only       Assessment/Plan:     Dyspnea:  -possible worsening baseline but is without worsening hypoxemia  -not overtly in COPD exacerbation although maybe mild underlying exacerbation on chronically debilitated status  -not likely COPD induced as he did not benefit from for day trial of methylprednisolone  -CTA PE on 04/28 without any gross CT abnormality   -plan for right heart and left heart catheterization today  Chronic hypoxemic respiratory failure:  -patient is typically on 3 L NC  -has chronic dyspnea on exertion with recent worsening   -stable at 99% on 2 L  Severe emphysematous COPD with Possible Acute Exacerbation:  -FEV 1 40% as of Jan 2020  -unclear if this is exacerbation or generally worsening baseline or non pulmonary dyspnea  -has recently been on steroid taper in OP setting  -unlikely exacerbation  Alpha-1 antitrypsin deficiency:  -is on weekly Zemaira injections     ______________________________________________________________________    Subjective / 24 hour events:     Dyspnea only mild improved since last weekend  Patient aware and discussed plan for cardiac catheterization today  Pertinent labs Reviewed  Pertinent imaging Reviewed:  CTA PE study  negative for PE    Collaborative Discussion: d/w CFP team and Cardiology team with plan for left and right heart catheterization today approximately 13:30  Tele Events:     Vitals:   Vitals:    22 0500 22 0718 22 0900 22 1225   BP:   140/80    BP Location:       Pulse:   60    Resp:   16    Temp:   97 8 °F (36 6 °C)    TempSrc:       SpO2: 97% 98% 99% 95%   Weight:       Height:         Weight (last 2 days)     Date/Time Weight    22 0952 80 3 (177)        Temp (24hrs), Av 9 °F (36 6 °C), Min:97 3 °F (36 3 °C), Max:98 2 °F (36 8 °C)  Current: Temperature: 97 8 °F (36 6 °C)          IV Infusions:       Nutrition:        Diet Orders   (From admission, onward)             Start     Ordered    22 0001  Diet NPO; Sips of clear liquids  Diet effective midnight        References:    Nutrtion Support Algorithm Enteral vs  Parenteral   Question Answer Comment   Diet Type NPO    NPO Except: Sips of clear liquids    RD to adjust diet per protocol?  Yes        22 1430    22 1018  Room Service  Once        Question:  Type of Service  Answer:  Room Service - Appropriate with Assistance    22 1017                Ins/Outs:   I/O        07 0700  07 07 07 0700    Urine (mL/kg/hr) 500 250 (0 1)     Total Output 500 250     Net -500 -250                  Lines/Drains:  Invasive Devices  Report    Peripheral Intravenous Line            Peripheral IV 22 Right;Ventral (anterior) Forearm <1 day                 Active medications:  Scheduled Meds:  Current Facility-Administered Medications   Medication Dose Route Frequency Provider Last Rate    acetaminophen  650 mg Oral Q6H PRN Siomara Martell MD      albuterol  2 puff Inhalation Q4H PRN Siomara Martell MD      amLODIPine  10 mg Oral Daily Siomara Martell MD      budesonide  0 5 mg Nebulization BID Siomara Martell MD      busPIRone  10 mg Oral TID Siomara Martell MD      carbidopa-levodopa  1 tablet Oral TID With Meals Siomara Martell MD      cholestyramine sugar free  4 g Oral Daily Siomara Martell MD      enoxaparin  40 mg Subcutaneous Daily Siomara Martell MD      finasteride  5 mg Oral QAM Siomara Martell MD      fluticasone  2 spray Nasal Daily Siomara Martell MD      formoterol  20 mcg Nebulization Q12H Ceil Grams, PA-C      gabapentin  300 mg Oral TID Siomara Martell MD      ipratropium-albuterol  3 mL Nebulization 4x Daily Siomara Martell MD      metoprolol tartrate  25 mg Oral Q12H [place], CRNP      mirtazapine  15 mg Oral HS Siomara Martell MD      pantoprazole  40 mg Oral BID Siomara Martell MD      tamsulosin  0 4 mg Oral Daily Siomara Martell MD       PRN Meds:acetaminophen, 650 mg, Q6H PRN  albuterol, 2 puff, Q4H PRN      ____________________________________________________________________    Physical Exam  Constitutional:       Appearance: He is well-developed  HENT:      Head: Normocephalic and atraumatic  Eyes:      Conjunctiva/sclera: Conjunctivae normal       Pupils: Pupils are equal, round, and reactive to light  Cardiovascular:      Rate and Rhythm: Normal rate and regular rhythm  Heart sounds: Normal heart sounds  No murmur heard  No friction rub  No gallop  Pulmonary:      Effort: Pulmonary effort is normal  Tachypnea present  No respiratory distress  Breath sounds: Normal breath sounds  No wheezing or rales  Comments: 99% on 3 L NC     Mildly improved tachypneic from prior  Chest:      Chest wall: No tenderness  Abdominal:      General: Bowel sounds are normal       Palpations: Abdomen is soft  Musculoskeletal:         General: Normal range of motion  Cervical back: Normal range of motion and neck supple  Skin:     General: Skin is warm and dry  Neurological:      Mental Status: He is alert and oriented to person, place, and time         ____________________________________________________________________    Invasive/non-invasive ventilation settings   Respiratory  Report   Lab Data (Last 4 hours)    None         O2/Vent Data (Last 4 hours)    None                Laboratory and Diagnostics:  Results from last 7 days   Lab Units 05/03/22 0519 05/02/22 0434 05/01/22 0527 04/30/22  0600 04/29/22  0624 04/28/22  1122   WBC Thousand/uL 12 13* 11 41* 11 88* 7 87 11 57* 6 32   HEMOGLOBIN g/dL 13 6 14 0 13 7 14 4 14 3 14 7   HEMATOCRIT % 40 3 41 5 41 1 42 8 41 6 44 1   PLATELETS Thousands/uL 117* 131* 136* 145* 132* 118*   NEUTROS PCT %  --  83* 84*  --  79* 46   MONOS PCT %  --  4 4  --  5 6   MONO PCT % 3*  --   --   --   --   --      Results from last 7 days   Lab Units 05/02/22 0434 05/01/22 0527 04/30/22  0600 04/29/22  0512 04/28/22  1122   SODIUM mmol/L 140 138 138 139 141   POTASSIUM mmol/L 4 5 4 3 4 4 4 4 4 2   CHLORIDE mmol/L 106 105 104 103 103   CO2 mmol/L 28 24 26 27 30   ANION GAP mmol/L 6 9 8 9 8   BUN mg/dL 28* 27* 23 23 21   CREATININE mg/dL 1 11 1 04 1 08 1 07 1 23   CALCIUM mg/dL 8 4 8 7 8 9 9 3 9 0   GLUCOSE RANDOM mg/dL 127 159* 161* 127 115   ALT U/L  --   --  37  --  45   AST U/L  --   --  16  --  25   ALK PHOS U/L  --   --  73  --  76   ALBUMIN g/dL  --   --  3 4*  --  3 9   TOTAL BILIRUBIN mg/dL  --   --  0 79  --  1 21*     Results from last 7 days   Lab Units 04/28/22  1122   MAGNESIUM mg/dL 2 0      Results from last 7 days   Lab Units 04/28/22  1122   INR  1 10   PTT seconds 29              ABG:  Results from last 7 days   Lab Units 04/28/22  1146   PH ART  7 430   PCO2 ART mm Hg 41 4 PO2 ART mm Hg 171 2*   HCO3 ART mmol/L 26 9   BASE EXC ART mmol/L 2 3   ABG SOURCE  Radial, Right     VBG:  Results from last 7 days   Lab Units 04/28/22  1146   ABG SOURCE  Radial, Right           Micro        Imaging:   CTA ED chest PE Study   Final Result by Mauro Julien MD (04/28 2980)      1  No evidence of acute pulmonary embolism  2  Pulmonary emphysema and parenchymal scarring without evidence of acute infiltrate   3  Trace bilateral effusions                     Workstation performed: [id number]         XR chest 1 view portable   Final Result by Arielle Banks MD (04/28 2919)      No acute cardiopulmonary disease  Workstation performed: [id number]             Micro:   Lab Results   Component Value Date    BLOODCX No Growth After 5 Days  08/08/2021    BLOODCX No Growth After 5 Days  08/08/2021    BLOODCX No Growth After 5 Days   12/29/2017    WOUNDCULT 3+ Growth of Staphylococcus aureus (A) 06/12/2019    MRSACULTURE  11/11/2019     No Methicillin Resistant Staphlyococcus aureus (MRSA) isolated            Invalid input(s): [place] Shira

## 2022-05-03 NOTE — CASE MANAGEMENT
Case Management Progress Note    Patient name Rocklin Cushing  Location [facility] MRN [id number]  : 1945 Date 5/3/2022       LOS (days): 5  Geometric Mean LOS (GMLOS) (days): 3 60  Days to GMLOS:-1 3        OBJECTIVE:     Current admission status: Inpatient  Preferred Pharmacy:   [id number] - [facility], [address]  [address] [facility] [address]  Phone: [phone] Fax: [phone]    [address] [address] [address]  [address] [facility] [address]  Phone: [phone] Fax: [phone]    [address], [address]  [address] [facility] [address]  Phone: [phone] Fax: [phone]    Primary Care Provider: Juan Reese MD    Primary Insurance: MEDICARE  Secondary Insurance: [facility]    PROGRESS NOTE:    Weekly Care Management Length of Stay Review     Current LOS: 5    Most Recent Labs:     Lab Results   Component Value Date/Time    WBC 12 13 (H) 2022 05:19 AM    HGB 13 6 2022 05:19 AM    HCT 40 3 2022 05:19 AM     (L) 2022 05:19 AM    SODIUM 140 2022 04:34 AM    K 4 5 2022 04:34 AM     2022 04:34 AM    CO2 28 2022 04:34 AM    BUN 28 (H) 2022 04:34 AM    CREATININE 1 11 2022 04:34 AM    GLUC 127 2022 04:34 AM       Most Recent Vitals:   Vitals:    22 0900   BP: 140/80   Pulse: (!) 50   Resp: 16   Temp: 97 8 °F (36 6 °C)   SpO2: 99%        Brief Care Summary & Need for Continued Stay[de-identified] COPD exacerbation on 3LNC; Cardio consulted to r/o HF contributing to SOB  Planned for cardiac cath today      Intended Discharge Disposition: STR ([address]  or OS)    Expected Discharge Date: 24-48 Hrs    Current Identified Barriers to Discharge & Delays[de-identified]      D/C Goals/ CM Interventions[de-identified]

## 2022-05-03 NOTE — PLAN OF CARE
Problem: Potential for Falls  Goal: Patient will remain free of falls  Description: INTERVENTIONS:  - Educate patient/family on patient safety including physical limitations  - Instruct patient to call for assistance with activity   - Consult OT/PT to assist with strengthening/mobility   - Keep Call bell within reach  - Keep bed low and locked with side rails adjusted as appropriate  - Keep care items and personal belongings within reach  - Initiate and maintain comfort rounds  - Make Fall Risk Sign visible to staff  - Apply yellow socks and bracelet for high fall risk patients  - Consider moving patient to room near nurses station  Outcome: Progressing     Problem: Nutrition/Hydration-ADULT  Goal: Nutrient/Hydration intake appropriate for improving, restoring or maintaining nutritional needs  Description: Monitor and assess patient's nutrition/hydration status for malnutrition  Collaborate with interdisciplinary team and initiate plan and interventions as ordered  Monitor patient's weight and dietary intake as ordered or per policy  Utilize nutrition screening tool and intervene as necessary  Determine patient's food preferences and provide high-protein, high-caloric foods as appropriate       INTERVENTIONS:  - Monitor oral intake, urinary output, labs, and treatment plans  - Assess nutrition and hydration status and recommend course of action  - Evaluate amount of meals eaten  - Assist patient with eating if necessary   - Allow adequate time for meals  - Recommend/ encourage appropriate diets, oral nutritional supplements, and vitamin/mineral supplements  - Order, calculate, and assess calorie counts as needed  - Recommend, monitor, and adjust tube feedings and TPN/PPN based on assessed needs  - Assess need for intravenous fluids  - Provide specific nutrition/hydration education as appropriate  - Include patient/family/caregiver in decisions related to nutrition  Outcome: Progressing     Problem: RESPIRATORY - ADULT  Goal: Achieves optimal ventilation and oxygenation  Description: INTERVENTIONS:  - Assess for changes in respiratory status  - Assess for changes in mentation and behavior  - Position to facilitate oxygenation and minimize respiratory effort  - Oxygen administered by appropriate delivery if ordered  - Initiate smoking cessation education as indicated  - Encourage broncho-pulmonary hygiene including cough, deep breathe, Incentive Spirometry  - Assess the need for suctioning and aspirate as needed  - Assess and instruct to report SOB or any respiratory difficulty  - Respiratory Therapy support as indicated  Outcome: Progressing     Problem: MOBILITY - ADULT  Goal: Maintain or return to baseline ADL function  Description: INTERVENTIONS:  -  Assess patient's ability to carry out ADLs; assess patient's baseline for ADL function and identify physical deficits which impact ability to perform ADLs (bathing, care of mouth/teeth, toileting, grooming, dressing, etc )  - Assess/evaluate cause of self-care deficits   - Assess range of motion  - Assess patient's mobility; develop plan if impaired  - Assess patient's need for assistive devices and provide as appropriate  - Encourage maximum independence but intervene and supervise when necessary  - Involve family in performance of ADLs  - Assess for home care needs following discharge   - Consider OT consult to assist with ADL evaluation and planning for discharge  - Provide patient education as appropriate  Outcome: Progressing  Goal: Maintains/Returns to pre admission functional level  Description: INTERVENTIONS:  - Perform BMAT or MOVE assessment daily    - Set and communicate daily mobility goal to care team and patient/family/caregiver     - Collaborate with rehabilitation services on mobility goals if consulted  - Out of bed for toileting  - Record patient progress and toleration of activity level   Outcome: Progressing     Problem: Prexisting or High Potential for Compromised Skin Integrity  Goal: Skin integrity is maintained or improved  Description: INTERVENTIONS:  - Identify patients at risk for skin breakdown  - Assess and monitor skin integrity  - Assess and monitor nutrition and hydration status  - Monitor labs   - Assess for incontinence   - Turn and reposition patient  - Assist with mobility/ambulation  - Relieve pressure over bony prominences  - Avoid friction and shearing  - Provide appropriate hygiene as needed including keeping skin clean and dry  - Evaluate need for skin moisturizer/barrier cream  - Collaborate with interdisciplinary team   - Patient/family teaching  - Consider wound care consult   Outcome: Progressing

## 2022-05-04 ENCOUNTER — TELEPHONE (OUTPATIENT)
Dept: OTHER | Facility: OTHER | Age: 77
End: 2022-05-04

## 2022-05-04 ENCOUNTER — TELEPHONE (OUTPATIENT)
Dept: NEUROLOGY | Facility: CLINIC | Age: 77
End: 2022-05-04

## 2022-05-04 ENCOUNTER — TELEPHONE (OUTPATIENT)
Dept: FAMILY MEDICINE CLINIC | Facility: CLINIC | Age: 77
End: 2022-05-04

## 2022-05-04 VITALS
SYSTOLIC BLOOD PRESSURE: 128 MMHG | WEIGHT: 177 LBS | RESPIRATION RATE: 19 BRPM | TEMPERATURE: 98.3 F | HEIGHT: 77 IN | DIASTOLIC BLOOD PRESSURE: 60 MMHG | OXYGEN SATURATION: 96 % | BODY MASS INDEX: 20.9 KG/M2 | HEART RATE: 69 BPM

## 2022-05-04 LAB
ANION GAP SERPL CALCULATED.3IONS-SCNC: 7 MMOL/L (ref 4–13)
BUN SERPL-MCNC: 31 MG/DL (ref 5–25)
CALCIUM SERPL-MCNC: 8.2 MG/DL (ref 8.3–10.1)
CHLORIDE SERPL-SCNC: 104 MMOL/L (ref 100–108)
CO2 SERPL-SCNC: 26 MMOL/L (ref 21–32)
CREAT SERPL-MCNC: 1.02 MG/DL (ref 0.6–1.3)
ERYTHROCYTE [DISTWIDTH] IN BLOOD BY AUTOMATED COUNT: 13.6 % (ref 11.6–15.1)
GFR SERPL CREATININE-BSD FRML MDRD: 70 ML/MIN/1.73SQ M
GLUCOSE SERPL-MCNC: 131 MG/DL (ref 65–140)
HCT VFR BLD AUTO: 43 % (ref 36.5–49.3)
HGB BLD-MCNC: 14.1 G/DL (ref 12–17)
MCH RBC QN AUTO: 31.4 PG (ref 26.8–34.3)
MCHC RBC AUTO-ENTMCNC: 32.8 G/DL (ref 31.4–37.4)
MCV RBC AUTO: 96 FL (ref 82–98)
PLATELET # BLD AUTO: 111 THOUSANDS/UL (ref 149–390)
PMV BLD AUTO: 8.9 FL (ref 8.9–12.7)
POTASSIUM SERPL-SCNC: 4.8 MMOL/L (ref 3.5–5.3)
RBC # BLD AUTO: 4.49 MILLION/UL (ref 3.88–5.62)
SODIUM SERPL-SCNC: 137 MMOL/L (ref 136–145)
WBC # BLD AUTO: 8.38 THOUSAND/UL (ref 4.31–10.16)

## 2022-05-04 PROCEDURE — 99239 HOSP IP/OBS DSCHRG MGMT >30: CPT | Performed by: FAMILY MEDICINE

## 2022-05-04 PROCEDURE — 94760 N-INVAS EAR/PLS OXIMETRY 1: CPT

## 2022-05-04 PROCEDURE — 85027 COMPLETE CBC AUTOMATED: CPT

## 2022-05-04 PROCEDURE — 94640 AIRWAY INHALATION TREATMENT: CPT

## 2022-05-04 PROCEDURE — 99232 SBSQ HOSP IP/OBS MODERATE 35: CPT | Performed by: PHYSICIAN ASSISTANT

## 2022-05-04 PROCEDURE — 80048 BASIC METABOLIC PNL TOTAL CA: CPT

## 2022-05-04 PROCEDURE — 94762 N-INVAS EAR/PLS OXIMTRY CONT: CPT

## 2022-05-04 RX ORDER — PREDNISONE 10 MG/1
TABLET ORAL
Qty: 30 TABLET | Refills: 0
Start: 2022-05-04 | End: 2022-05-11 | Stop reason: SDUPTHER

## 2022-05-04 RX ADMIN — PANTOPRAZOLE SODIUM 40 MG: 20 TABLET, DELAYED RELEASE ORAL at 08:27

## 2022-05-04 RX ADMIN — IPRATROPIUM BROMIDE AND ALBUTEROL SULFATE 3 ML: 2.5; .5 SOLUTION RESPIRATORY (INHALATION) at 07:36

## 2022-05-04 RX ADMIN — FORMOTEROL FUMARATE DIHYDRATE 20 MCG: 20 SOLUTION RESPIRATORY (INHALATION) at 07:36

## 2022-05-04 RX ADMIN — IPRATROPIUM BROMIDE AND ALBUTEROL SULFATE 3 ML: 2.5; .5 SOLUTION RESPIRATORY (INHALATION) at 11:11

## 2022-05-04 RX ADMIN — BUDESONIDE 0.5 MG: 0.5 INHALANT ORAL at 07:36

## 2022-05-04 RX ADMIN — ENOXAPARIN SODIUM 40 MG: 40 INJECTION SUBCUTANEOUS at 08:26

## 2022-05-04 RX ADMIN — FINASTERIDE 5 MG: 5 TABLET, FILM COATED ORAL at 08:27

## 2022-05-04 RX ADMIN — FLUTICASONE PROPIONATE 2 SPRAY: 50 SPRAY, METERED NASAL at 09:45

## 2022-05-04 RX ADMIN — BUSPIRONE HYDROCHLORIDE 10 MG: 10 TABLET ORAL at 17:25

## 2022-05-04 RX ADMIN — CHOLESTYRAMINE 4 G: 4 POWDER, FOR SUSPENSION ORAL at 08:24

## 2022-05-04 RX ADMIN — CARBIDOPA AND LEVODOPA 1 TABLET: 25; 100 TABLET ORAL at 12:07

## 2022-05-04 RX ADMIN — BUSPIRONE HYDROCHLORIDE 10 MG: 10 TABLET ORAL at 08:26

## 2022-05-04 RX ADMIN — CARBIDOPA AND LEVODOPA 1 TABLET: 25; 100 TABLET ORAL at 17:25

## 2022-05-04 RX ADMIN — AMLODIPINE BESYLATE 10 MG: 10 TABLET ORAL at 08:26

## 2022-05-04 RX ADMIN — GABAPENTIN 300 MG: 300 CAPSULE ORAL at 08:26

## 2022-05-04 RX ADMIN — TAMSULOSIN HYDROCHLORIDE 0.4 MG: 0.4 CAPSULE ORAL at 08:26

## 2022-05-04 RX ADMIN — METOPROLOL TARTRATE 25 MG: 25 TABLET, FILM COATED ORAL at 08:26

## 2022-05-04 RX ADMIN — METHYLPREDNISOLONE SODIUM SUCCINATE 40 MG: 40 INJECTION, POWDER, FOR SOLUTION INTRAMUSCULAR; INTRAVENOUS at 08:27

## 2022-05-04 RX ADMIN — PANTOPRAZOLE SODIUM 40 MG: 20 TABLET, DELAYED RELEASE ORAL at 17:25

## 2022-05-04 RX ADMIN — CARBIDOPA AND LEVODOPA 1 TABLET: 25; 100 TABLET ORAL at 08:26

## 2022-05-04 RX ADMIN — GABAPENTIN 300 MG: 300 CAPSULE ORAL at 17:25

## 2022-05-04 NOTE — DISCHARGE SUMMARY
[facility] Discharge Summary - Medical Emiliano Christine 65 y o  male MRN: [id number]    [address] [facility] [address] / Bed: 3 [address] [place] Encounter: [id number]    BRIEF OVERVIEW  Admitting Provider: Ary Kwok MD  Discharge Provider: Keaton Pérez    Discharge To:  [facility]  Facility: [address] [place] [address]    Outpatient Follow-Up:  CFP, pulmonology, Neurology    Things to address at first follow up visit:   · Did he finish his steroid taper? · How is his breathing? · Is he using his inhalers? · Does he have a follow-up scheduled with pulmonology? · Does he have a follow-up scheduled with Neurology? · Check patient's blood pressure, needed extra medication during hospitalization for adequate BP control  Labs and results pending at discharge:  None    Admission Date: 4/28/2022     Discharge Date:  05/04/2022    Primary Discharge Diagnosis  Principal Problem:    COPD with acute exacerbation ([address] )  Active Problems:    Alpha-1-antitrypsin deficiency ([address] )    Gastroesophageal reflux disease    Essential hypertension    Acute and chronic respiratory failure with hypoxia (HCC)    BPH (benign prostatic hyperplasia)    Centrilobular emphysema (HCC)    Insomnia    Ambulatory dysfunction    Chronic diarrhea     Peripheral neuropathy    Orthostatic hypotension with spine hypertension    Palliative care patient    Depression, recurrent ([address] )    Anxiousness    Parkinson's disease ([address] )    Hypertensive urgency    Chest heaviness  Resolved Problems:    * No resolved hospital problems  *      Consulting Providers   Pulmonology, Cardiology    DETAILS OF HOSPITAL STAY    Procedures Performed/Pertinent Test results  5/3:  WBC 12 13  5/2: WBC 11 41, BUN 28  5/1: trop 11>11, WBC 11 88  4/30: , Alb 3 4, WBC 7 87, Plts 145   4/28: ABG 7 45/39/164/27    Echo (5/2): EF 50-55%, G1DD  CTA (4/28): No PE, emphysema, trace bilateral effusions  CXR (4/28):  No acute cardiopulmonary disease  ECG ED (4/28): Sinus rhythm with nonspecific STseg/Twaves     HPI: Copied from H&P  65-year-old male with history of alpha-1 antitrypsin deficiency, centrilobular emphysema with 2L home O2, hypertension presenting to the ED in respiratory distress  He endorsed SOB since 1 week prior to admission  SOB has been progressively worsening, he has tried home medications without improvement  Shortness of breath occurs at rest and worsens with exertion  Denies recent illness, fever, chills, cough  Has been compliant with medications  Patient also endorsed diarrhea but this is baseline for him      ED:  Albuterol 10 mg neb, ipratropium 1 mg neb, ipratropium 1 mg nebs, hydralazine 10 mg IV    Hospital Course    COPD and centrilobular emphysema  Patient with H/O central lobular emphysema due to alpha-1 antitrypsin deficiency presents with respiratory distress using accessory muscles  Although patient's ABG in the ED did not show hypercarbia, patient reports feeling better after the use of BiPAP  He is on 2 L O2 at home, and oxygen saturation remained above 95% on 3 L O2 during the hospital course  Patient had mild wheezing on exam and CXR is unremarkable  He was treated for possible COPD exacerbation with steroid trial, DuoNeb, fluticasone, budesonide, and albuterol  After having no clinical improvement with COPD exacerbation management, steroid was discontinued and Cardiology was consulted  Concerned for possible ischemia causing SOB vs pulmonary HTN related to 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  Echocardiogram showed preserved EF with G1DD  Right heart catheterization and coronary angiogram showed mild non-obstructive coronary disease, normal filling pressure, and normal pulmonary pressure  Since there was low suspicion for cardiac etiology, patient was placed back on Solu-Medrol  Bedside PFT not performed because there was no one available to do so    Patient will follow-up with pulmonology and have office spirometry done there  Patient was discharged on steroid taper as recommended by pulmonologist and close follow-up with pulmonology  Parkinson's disease  Patient was initially diagnosed with Parkinson's disease in late 2021, where he was started on carbidopa levodopa  mg TID  Presenting respiratory distress and worsening COPD could possibly be due to problems with coordination of activation of respiratory muscles  Possible respiratory dyskinesia where levodopa reaches its peak effect and started to wear off  Recommended close follow-up neurology outpatient    Hypertensive urgency  BP on arrival 223/98, no signs of end-organ damage at this time  Patient did not take his morning meds prior to the admission  BP well managed with continuing home medication Norvasc and adding lopressor  Midodrine was held throughout the stay due to normalized BP  Physical Exam at Discharge  Physical Exam  Vitals reviewed  Constitutional:       General: He is awake  He is not in acute distress  Cardiovascular:      Rate and Rhythm: Normal rate and regular rhythm  Heart sounds: Normal heart sounds, S1 normal and S2 normal    Pulmonary:      Effort: Pulmonary effort is normal       Breath sounds: Normal breath sounds and air entry  No decreased breath sounds, wheezing, rhonchi or rales  Abdominal:      General: Abdomen is flat  Bowel sounds are normal       Palpations: Abdomen is soft  Tenderness: There is no abdominal tenderness  There is no guarding  Neurological:      Mental Status: He is alert  Psychiatric:         Behavior: Behavior is cooperative            Medications   Current Discharge Medication List      CONTINUE these medications which have CHANGED    Details   predniSONE 10 mg tablet Take 4 tablets (40 mg total) by mouth daily for 3 days, THEN 3 tablets (30 mg total) daily for 3 days, THEN 2 tablets (20 mg total) daily for 3 days, THEN 1 tablet (10 mg total) daily for 3 days   Kimi Wooden: 30 tablet, Refills: 0    Associated Diagnoses: COPD exacerbation ([address] )             Current Discharge Medication List      CONTINUE these medications which have NOT CHANGED    Details   amLODIPine (NORVASC) 10 mg tablet TAKE ONE TABLET BY MOUTH DAILY   Qty: 30 tablet, Refills: 6    Associated Diagnoses: Essential hypertension      busPIRone (BUSPAR) 10 mg tablet TAKE ONE TABLET BY MOUTH THREE TIMES DAILY   Qty: 90 tablet, Refills: 3    Associated Diagnoses: Chronic respiratory failure with hypoxia ([facility])      carbamide peroxide (DEBROX) 6 5 % otic solution Administer 5 drops into both ears 2 (two) times a day as needed for ear pain  Qty: 15 mL, Refills: 0    Associated Diagnoses: Hearing loss of left ear due to cerumen impaction      cholestyramine (QUESTRAN) 4 g packet Take 1 packet by mouth daily      doxylamine (Unisom SleepTabs) 25 MG tablet Take 25 mg by mouth daily at bedtime as needed for sleep      finasteride (PROSCAR) 5 mg tablet TAKE ONE TABLET BY MOUTH IN THE MORNING   Qty: 90 tablet, Refills: 3    Associated Diagnoses: Benign prostatic hyperplasia with urinary obstruction      fluticasone (FLONASE) 50 mcg/act nasal spray 2 sprays into each nostril daily  Qty: 16 g, Refills: 5    Associated Diagnoses: Rhinitis, unspecified type      gabapentin (NEURONTIN) 300 mg capsule TAKE ONE CAPSULE BY MOUTH THREE TIMES DAILY  Qty: 90 capsule, Refills: 0    Associated Diagnoses: Neuropathy      ipratropium-albuterol (DUO-NEB) 0 5-2 5 mg/3 mL nebulizer solution Take 3 mL by nebulization 4 (four) times a day  Qty: 360 mL, Refills: 5    Associated Diagnoses: COPD exacerbation ([facility])      LORazepam (ATIVAN) 1 mg tablet       mirtazapine (REMERON) 15 mg tablet Take 1 tablet (15 mg total) by mouth daily at bedtime  Qty: 30 tablet, Refills: 5    Associated Diagnoses: Primary insomnia      pantoprazole (PROTONIX) 40 mg tablet TAKE ONE TABLET BY MOUTH TWICE DAILY  Qty: 180 tablet, Refills: 0    Associated Diagnoses: Gastroesophageal reflux disease without esophagitis      tamsulosin (FLOMAX) 0 4 mg TAKE ONE CAPSULE BY MOUTH DAILY  Qty: 90 capsule, Refills: 0    Associated Diagnoses: Benign prostatic hyperplasia with urinary obstruction      Ventolin  (90 Base) MCG/ACT inhaler Inhale 2 puffs every 4 (four) hours as needed for wheezing  Qty: 18 g, Refills: 5    Associated Diagnoses: Centrilobular emphysema ([address] )      ZEMAIRA infusion once a week       budesonide (PULMICORT) 0 5 mg/2 mL nebulizer solution Take 2 mL (0 5 mg total) by nebulization 2 (two) times a day  Qty: 360 mL, Refills: 11    Associated Diagnoses: Centrilobular emphysema (HCC)      carbidopa-levodopa (SINEMET)  mg per tablet TAKE HALF TABLET BY MOUTH 30 minutes prior to breakfast, lunch, and dinner for 1 week  then increase to 1 tablet prior to each meal  Qty: 90 tablet, Refills: 0    Associated Diagnoses: Parkinson's disease (HCC)      Diclofenac Sodium (VOLTAREN) 1 % Apply 2 g topically 4 (four) times a day for 7 days Apply to R knee  Qty: 56 g, Refills: 0    Associated Diagnoses: Palliative care patient; Arthritis      midodrine (PROAMATINE) 10 MG tablet Take 1 tab three times daily  Please hold medication if blood pressure is above 055 systolic    Qty: 90 tablet, Refills: 4    Associated Diagnoses: Orthostatic hypertension      OXYGEN-HELIUM IN Inhale 2L at rest- 3L with activity            Current Discharge Medication List      START taking these medications    Details   metoprolol tartrate (LOPRESSOR) 25 mg tablet Take 1 tablet (25 mg total) by mouth every 12 (twelve) hours  Qty: 30 tablet, Refills: 0    Associated Diagnoses: Essential hypertension            Current Discharge Medication List             Allergies  Allergies   Allergen Reactions    Chantix [Varenicline]      Caused prostate infection       Diet restrictions: Cardiac diet  Activity restrictions: As tolerated  Code Status: Level 1 - Full Code  Advance Directive and Living Will: <no information>  Power of :    POLST:      Discharge Condition: stable      Discharge  Statement   I spent more than 30 minutes discharging the patient  This time was spent on the day of discharge  I had direct contact with the patient on the day of discharge  Additional documentation is required if more than 30 minutes were spent on discharge

## 2022-05-04 NOTE — PLAN OF CARE
Problem: Potential for Falls  Goal: Patient will remain free of falls  Description: INTERVENTIONS:  - Educate patient/family on patient safety including physical limitations  - Instruct patient to call for assistance with activity   - Consult OT/PT to assist with strengthening/mobility   - Keep Call bell within reach  - Keep bed low and locked with side rails adjusted as appropriate  - Keep care items and personal belongings within reach  - Initiate and maintain comfort rounds  - Make Fall Risk Sign visible to staff  - Offer Toileting every 2 Hours, in advance of need  - Initiate/Maintain bed alarm  - Obtain necessary fall risk management equipment: walker  - Apply yellow socks and bracelet for high fall risk patients  - Consider moving patient to room near nurses station  Outcome: Progressing     Problem: Nutrition/Hydration-ADULT  Goal: Nutrient/Hydration intake appropriate for improving, restoring or maintaining nutritional needs  Description: Monitor and assess patient's nutrition/hydration status for malnutrition  Collaborate with interdisciplinary team and initiate plan and interventions as ordered  Monitor patient's weight and dietary intake as ordered or per policy  Utilize nutrition screening tool and intervene as necessary  Determine patient's food preferences and provide high-protein, high-caloric foods as appropriate       INTERVENTIONS:  - Monitor oral intake, urinary output, labs, and treatment plans  - Assess nutrition and hydration status and recommend course of action  - Evaluate amount of meals eaten  - Assist patient with eating if necessary   - Allow adequate time for meals  - Recommend/ encourage appropriate diets, oral nutritional supplements, and vitamin/mineral supplements  - Order, calculate, and assess calorie counts as needed  - Recommend, monitor, and adjust tube feedings and TPN/PPN based on assessed needs  - Assess need for intravenous fluids  - Provide specific nutrition/hydration education as appropriate  - Include patient/family/caregiver in decisions related to nutrition  Outcome: Progressing     Problem: RESPIRATORY - ADULT  Goal: Achieves optimal ventilation and oxygenation  Description: INTERVENTIONS:  - Assess for changes in respiratory status  - Assess for changes in mentation and behavior  - Position to facilitate oxygenation and minimize respiratory effort  - Oxygen administered by appropriate delivery if ordered  - Initiate smoking cessation education as indicated  - Encourage broncho-pulmonary hygiene including cough, deep breathe, Incentive Spirometry  - Assess the need for suctioning and aspirate as needed  - Assess and instruct to report SOB or any respiratory difficulty  - Respiratory Therapy support as indicated  Outcome: Progressing     Problem: Prexisting or High Potential for Compromised Skin Integrity  Goal: Skin integrity is maintained or improved  Description: INTERVENTIONS:  - Identify patients at risk for skin breakdown  - Assess and monitor skin integrity  - Assess and monitor nutrition and hydration status  - Monitor labs   - Assess for incontinence   - Turn and reposition patient  - Assist with mobility/ambulation  - Relieve pressure over bony prominences  - Avoid friction and shearing  - Provide appropriate hygiene as needed including keeping skin clean and dry  - Evaluate need for skin moisturizer/barrier cream  - Collaborate with interdisciplinary team   - Patient/family teaching  - Consider wound care consult   Outcome: Progressing

## 2022-05-04 NOTE — TELEPHONE ENCOUNTER
Patient called from the hospital worried about his neurology appointment this month  He is asking for assistance to set up lyft for the appointment   He states the office told him they can't schedule lyft for him

## 2022-05-04 NOTE — CASE MANAGEMENT
Case Management Discharge Planning Note    Patient name Burton [place]  Location 3 [facility] [address]-* MRN [id number]  : 1945 Date 2022       Current Admission Date: 2022  Current Admission Diagnosis:COPD with acute exacerbation [facility])   Patient Active Problem List    Diagnosis Date Noted    Chest heaviness 2022    Hypertensive urgency 2022    Other secondary hypertension 2022    Cognitive impairment 2021    Parkinson's disease ([address] ) 2021    Anxiousness 2021    Vitamin D deficiency disease 2021    Avascular necrosis of hip, right ([address] ) 2021    Depression, recurrent ([address] ) 2021    Palliative care patient 11/10/2020    Orthostatic hypotension with spine hypertension 2020    Thrombocytopenia ([address] ) 2020    COPD with acute exacerbation ([address] ) 2019    Other constipation 2019    Ambulatory dysfunction 2019    Pulmonary hypertension ([address] ) 2019    Insomnia 2019    Chronic venous insufficiency 2019    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 (HCC) 2019    Herpes labialis 2019    Elevated PSA 10/25/2018    Centrilobular emphysema ([address] ) 2018    Chronic respiratory failure with hypoxia ([address] ) 2018    Former smoker 2018    Liver disease     Alpha-1-antitrypsin deficiency ([address] ) 2017    Gastroesophageal reflux disease 2017    Essential hypertension 2017    Acute and chronic respiratory failure with hypoxia ([address] ) 2017    BPH (benign prostatic hyperplasia) 2017    Peripheral neuropathy 2017    Allergic rhinitis 2016    Cataracts, both eyes 2015    Chronic diarrhea  2014    Benign colon polyp 2014      LOS (days): 6  Geometric Mean LOS (GMLOS) (days): 3 60  Days to GMLOS:-2 4     OBJECTIVE:  Risk of Unplanned Readmission Score: 21      Current admission status: Inpatient Preferred Pharmacy:   [address] [facility], [address]  [address] [place] [address]  Phone: [phone] Fax: [phone]    [address] [facility] [address] [address]  Phone: [phone] Fax: [phone]    [address], [address] [facility]  [address] [facility] [address]  Phone: [phone] Fax: [phone]    Primary Care Provider: Melinda Valenzuela MD    Primary Insurance: MEDICARE  Secondary Insurance: [employer] [place]    DISCHARGE DETAILS:    Discharge planning discussed with[de-identified] Patient     CM contacted family/caregiver?: Yes  Were Treatment Team discharge recommendations reviewed with patient/caregiver?: Yes  Did patient/caregiver verbalize understanding of patient care needs?: Yes  Were patient/caregiver advised of the risks associated with not following Treatment Team discharge recommendations?: Yes    Contacts  Patient Contacts: Bang Guillaume  Relationship to Patient[de-identified] Family  Contact Method: In Person  Reason/Outcome: Discharge Planning,Continuity of Care,Emergency Contact    Would you like to participate in our [address] service program?  : No - Declined    Treatment Team Recommendation: Short Term Rehab  Discharge Destination Plan[de-identified] Short Term Rehab  Transport at Discharge : BLS Ambulance     Number/Name of Dispatcher: [facility]  Transported by [employer] and Unit #): [place]  ETA of Transport (Date): 05/04/22  ETA of Transport (Time): 1500     IMM Given (Date):: 05/04/22  IMM Given to[de-identified] Patient   IMM reviewed with patient, patient agrees with discharge determination  Patient declined copy  Original placed in scan bin  Accepting Facility Name, [address] : [address] [facility]  Receiving Facility/Agency Phone Number: [phone]  Facility/Agency Fax Number: [phone]    Patient has been cleared for discharge today  [facility] has a bed available for admission after 1400  SW met with patient to discuss and issue IMM#2  Patient is agreeable for discharge today to SNF  SW called dtr Suly Hubbard to advise  BLS pickup arranged for 1500 pickup with [facility]  Medical necessity form completed and provided to nursing

## 2022-05-04 NOTE — PROGRESS NOTES
Progress Note - Pulmonary Medicine  Alysia Giordano 65 y o  male MRN: [id number]  Unit/Bed#: [address] [place] [address] Encounter: [id number]  Code Status: Level 1 - Full Code    Assessment/Plan:    William Vargas is a 65 y o  Past Medical History:   Diagnosis Date    Anesthesia complication     Difficult to wake up    Arthritis     BPH (benign prostatic hyperplasia)     urinary frequency    Cancer (HCC)     basal cell neck, face    COPD (chronic obstructive pulmonary disease) (HCC)     COPD exacerbation ([facility] [address] ) 12/29/2019    Full dentures     Hiatal hernia     History of methicillin resistant staphylococcus aureus (MRSA)     10/11/2018 MRSA (nares) positive    Hypertension     Irritable bowel syndrome     Kidney stone     at least 7 episodes    Liver disease     Alpha 1- enzyme deficiency - diagnosed 2002   has been on weekly replacement therapy since then    [facility]     1/25/15  FEV1 - 2 45 liters or 59% of predicted    RSV infection 12/2017    Wears glasses     for driving only       Assessment/Plan:     Dyspnea:  -possible worsening baseline but is without worsening hypoxemia  -not overtly in COPD exacerbation although maybe mild underlying exacerbation on chronically debilitated status  -not likely COPD induced as he did not benefit from for day trial of methylprednisolone  -CTA PE on 04/28 without any gross CT abnormality   -RHC/LHC on 5/3 w/ mild non obstructive CAD / and EF / PAP WNL  Chronic hypoxemic respiratory failure:  -patient is typically on 3 L NC  -has chronic dyspnea on exertion with recent worsening   -stable at 99% on 2 L  Severe emphysematous COPD with Possible Acute Exacerbation:  -FEV 1 40% as of Jan 2020  -unclear if this is exacerbation or generally worsening baseline or non pulmonary dyspnea  -has recently been on steroid taper in OP setting  -unlikely exacerbation but may need chronic low dose steroid   -continue solumedrol 40 mg Q 12 today with plan for wean / taper tomorrow and if stable for taper in OP setting   -pending bedside spirometry today   Alpha-1 antitrypsin deficiency:  -is on weekly Zemaira injections     ______________________________________________________________________    Subjective / 24 hour events:     Patient states he is non dyspneic now at rest   Still gets winded on ambulation but mildly better than when on admission  Discussed he is pending bedside spirometry today  Pertinent labs Reviewed  Pertinent imaging Reviewed:  CTA PE study  negative for PE    Collaborative Discussion: d/w CFP team   Tele Events:     Vitals:   Vitals:    22 0500 22 0700 22 0736 22 1000   BP: 133/70 138/74     BP Location: Left arm Left arm     Pulse: 57 57 60    Resp: 18 18 18    Temp: 97 6 °F (36 4 °C) 97 7 °F (36 5 °C)     TempSrc: Oral Oral     SpO2: 99% 97% 97% 97%   Weight:       Height:         Weight (last 2 days)     Date/Time Weight    22 0952 80 3 (177)        Temp (24hrs), Av 9 °F (36 6 °C), Min:97 6 °F (36 4 °C), Max:98 5 °F (36 9 °C)  Current: Temperature: 97 7 °F (36 5 °C)          IV Infusions:       Nutrition:        Diet Orders   (From admission, onward)             Start     Ordered    22  Diet Regular; Regular House  Diet effective now        References:    Nutrtion Support Algorithm Enteral vs  Parenteral   Question Answer Comment   Diet Type Regular    Regular Regular House    RD to adjust diet per protocol?  Yes        22 1018  Room Service  Once        Question:  Type of Service  Answer:  Room Service - Appropriate with Assistance    22 1017                Ins/Outs:   I/O        07 07 07 07 07 07    Urine (mL/kg/hr) 500 250 (0 1)     Total Output 500 250     Net -500 -250                  Lines/Drains:  Invasive Devices  Report    Peripheral Intravenous Line            Peripheral IV 22 Right;Ventral (anterior) Forearm 1 day Active medications:  Scheduled Meds:  Current Facility-Administered Medications   Medication Dose Route Frequency Provider Last Rate    acetaminophen  650 mg Oral Q6H PRN Izzy Hall MD      albuterol  2 puff Inhalation Q4H PRN Izzy Hall MD      amLODIPine  10 mg Oral Daily Izzy Hall MD      budesonide  0 5 mg Nebulization BID Izzy Hall MD      busPIRone  10 mg Oral TID Izzy Hall MD      carbidopa-levodopa  1 tablet Oral TID With Meals Izzy Hall MD      cholestyramine sugar free  4 g Oral Daily Izzy Hall MD      enoxaparin  40 mg Subcutaneous Daily Izzy Hall MD      finasteride  5 mg Oral QAM Izzy Hall MD      fluticasone  2 spray Nasal Daily Izzy Hall MD      formoterol  20 mcg Nebulization Q12H Donald Burton PA-C      gabapentin  300 mg Oral TID Izzy Hall MD      ipratropium-albuterol  3 mL Nebulization 4x Daily Izzy Hall MD      methylPREDNISolone sodium succinate  40 mg Intravenous Q12H Enzo Moyer MD      metoprolol tartrate  25 mg Oral Q12H [employer], CRVANNESA      mirtazapine  15 mg Oral HS Izzy Hall MD      pantoprazole  40 mg Oral BID Izzy Hall MD      tamsulosin  0 4 mg Oral Daily Izzy Hall MD       PRN Meds:acetaminophen, 650 mg, Q6H PRN  albuterol, 2 puff, Q4H PRN      ____________________________________________________________________    Physical Exam  Constitutional:       General: He is not in acute distress  Appearance: He is well-developed  Comments: Elderly in appearance   HENT:      Head: Normocephalic and atraumatic  Mouth/Throat:      Pharynx: No oropharyngeal exudate  Eyes:      Pupils: Pupils are equal, round, and reactive to light  Neck:      Thyroid: No thyromegaly  Vascular: No JVD  Trachea: No tracheal deviation  Cardiovascular:      Heart sounds: No murmur heard  No friction rub  No gallop  Pulmonary:      Effort: Pulmonary effort is normal  No respiratory distress  Breath sounds: Normal breath sounds  No stridor  No wheezing or rales  Comments: 99% on 4 L     Remains 96% on 2 L     Mild conversational dyspnea  Chest:      Chest wall: No tenderness  Abdominal:      General: There is no distension  Tenderness: There is no abdominal tenderness  There is no guarding  Musculoskeletal:         General: Normal range of motion  Cervical back: Normal range of motion and neck supple  Skin:     General: Skin is warm and dry  Findings: No erythema or rash  Neurological:      Mental Status: He is alert and oriented to person, place, and time         ____________________________________________________________________    Invasive/non-invasive ventilation settings   Respiratory  Report   Lab Data (Last 4 hours)    None         O2/Vent Data (Last 4 hours)    None                Laboratory and Diagnostics:  Results from last 7 days   Lab Units 05/04/22 0526 05/03/22 0519 05/02/22 0434 05/01/22  0527 04/30/22  0600 04/29/22  0624 04/28/22  1122   WBC Thousand/uL 8 38 12 13* 11 41* 11 88* 7 87 11 57* 6 32   HEMOGLOBIN g/dL 14 1 13 6 14 0 13 7 14 4 14 3 14 7   HEMATOCRIT % 43 0 40 3 41 5 41 1 42 8 41 6 44 1   PLATELETS Thousands/uL 111* 117* 131* 136* 145* 132* 118*   NEUTROS PCT %  --   --  83* 84*  --  79* 46   MONOS PCT %  --   --  4 4  --  5 6   MONO PCT %  --  3*  --   --   --   --   --      Results from last 7 days   Lab Units 05/04/22 0526 05/02/22 0434 05/01/22  0527 04/30/22  0600 04/29/22  0512 04/28/22  1122   SODIUM mmol/L 137 140 138 138 139 141   POTASSIUM mmol/L 4 8 4 5 4 3 4 4 4 4 4 2   CHLORIDE mmol/L 104 106 105 104 103 103   CO2 mmol/L 26 28 24 26 27 30   ANION GAP mmol/L 7 6 9 8 9 8   BUN mg/dL 31* 28* 27* 23 23 21   CREATININE mg/dL 1 02 1 11 1 04 1 08 1 07 1 23   CALCIUM mg/dL 8 2* 8 4 8 7 8 9 9 3 9 0   GLUCOSE RANDOM mg/dL 131 127 159* 161* 127 115   ALT U/L  --   --   --  37  --  45   AST U/L  --   --   --  16  --  25   ALK PHOS U/L  --   --   --  73  --  76   ALBUMIN g/dL  --   --   --  3 4*  --  3 9   TOTAL BILIRUBIN mg/dL  --   --   --  0 79  --  1 21*     Results from last 7 days   Lab Units 04/28/22  1122   MAGNESIUM mg/dL 2 0      Results from last 7 days   Lab Units 04/28/22  1122   INR  1 10   PTT seconds 29              ABG:  Results from last 7 days   Lab Units 04/28/22  1146   PH ART  7 430   PCO2 ART mm Hg 41 4   PO2 ART mm Hg 171 2*   HCO3 ART mmol/L 26 9   BASE EXC ART mmol/L 2 3   ABG SOURCE  Radial, Right     VBG:  Results from last 7 days   Lab Units 04/28/22  1146   ABG SOURCE  Radial, Right           Micro        Imaging:   CTA ED chest PE Study   Final Result by Yesenia Baer MD (04/28 6667)      1  No evidence of acute pulmonary embolism  2  Pulmonary emphysema and parenchymal scarring without evidence of acute infiltrate   3  Trace bilateral effusions                     Workstation performed: [id number]         XR chest 1 view portable   Final Result by Luis Eduardo Upton MD (04/28 9184)      No acute cardiopulmonary disease  Workstation performed: [id number]             Micro:   Lab Results   Component Value Date    BLOODCX No Growth After 5 Days  08/08/2021    BLOODCX No Growth After 5 Days  08/08/2021    BLOODCX No Growth After 5 Days   12/29/2017    WOUNDCULT 3+ Growth of Staphylococcus aureus (A) 06/12/2019    MRSACULTURE  11/11/2019     No Methicillin Resistant Staphlyococcus aureus (MRSA) isolated            Invalid input(s): Clovis Rosa

## 2022-05-04 NOTE — TELEPHONE ENCOUNTER
Contacted the patient to confirm the appointment on 05/12/22, the patient states that he is current in the hospital and will FU in rehab  The patient states that he will call back to reschedule the appointment

## 2022-05-04 NOTE — WOUND OSTOMY CARE
Progress Note - Wound   Iris Jones 65 y o  male MRN: [id number]  Unit/Bed#: [address] [place] [address] Encounter: [id number]      Assessment: This is a 65year old male patient admitted on 4/28/22 with acute exacerbation of COPD  He has a history of HTN, Parkinsons, COPD, orthostatic hypotension, BPH, chronic diarrhea dn ambulatory dysfunction  He was awake, alert & oriented x 3 and is partially independent with some ADL's but mostly requires assistance  He is continent of urine and stool and ambulates to the bathroom with assist of 1 and is repositioned in the bed with assist of 1  Assessment Findings:  1  Healed pressure ulcer to left ear lobe - unable to determine initial stage  Probably from chronic use of oxygen at home  Orders written for protection and prevention  Plan:   1  Cleanse areas under oxygen tubing (R&L earlobe, R&L face) daily with soap & water & pat dry  Apply 3M No Sting (or equivalent) skin prep to these areas daily & prn  Make sure that grey foam padding on oxygen tubing stays in place to cushion the ear lobes and that tubing is not too tight  2  Apply hydraguard to b/l heels, buttocks and sacrum BID and PRN for prevention and protection  3  Apply skin nourishing cream the entire skin daily for moisture  4  Turn and reposition patient every  2 hours   5  Float heels off of bed with pillows to offload pressure   6   Apply EHOB waffle cushion to chair when OOB, if able          Wound 05/03/22 Ear Left (Active)   Wound Image   05/04/22 1200   Wound Description Epithelialization 05/04/22 1200   Pressure Injury Stage Healed 05/04/22 1200   Wound Length (cm) 0 cm 05/04/22 1200   Wound Width (cm) 0 cm 05/04/22 1200   Wound Depth (cm) 0 cm 05/04/22 1200   Wound Surface Area (cm^2) 0 cm^2 05/04/22 1200   Wound Volume (cm^3) 0 cm^3 05/04/22 1200   Calculated Wound Volume (cm^3) 0 cm^3 05/04/22 1200   Drainage Amount None 05/04/22 1200   Treatments Cleansed 05/04/22 1200   Dressing Protective barrier 05/04/22 1200       Wound 05/04/22 Buttocks (Active)   Treatments Cleansed 05/04/22 1200   Dressing Allevyn Sacral Foam 05/04/22 1200   Dressing Status Clean;Dry; Intact 05/04/22 1200     Discussed assessment findings, and plan of care/recommendations with Dante QUEVEDO  Wound care will follow along with patient throughout admission, please call or tiger text with questions and concerns  Recommendations written as orders    Jose Hsieh RN, BSN, [employer]

## 2022-05-04 NOTE — DISCHARGE INSTR - OTHER ORDERS
Plan:     1  Cleanse areas under oxygen tubing (R&L earlobe, R&L face) daily with soap & water & pat dry  Apply 3M No Sting (or equivalent) skin prep to these areas daily & prn  Make sure that grey foam padding on oxygen tubing stays in place to cushion the ear lobes and that tubing is not too tight  2  Apply hydraguard to b/l heels, buttocks and sacrum BID and PRN for prevention and protection  3  Apply skin nourishing cream to the entire skin daily for moisture  4  Turn and reposition patient every  2 hours   5  Float heels off of bed with pillows to offload pressure   6   Apply pressure redistribution waffle cushion to chair when OOB, if able

## 2022-05-04 NOTE — PLAN OF CARE
Problem: Potential for Falls  Goal: Patient will remain free of falls  Description: INTERVENTIONS:  - Educate patient/family on patient safety including physical limitations  - Instruct patient to call for assistance with activity   - Consult OT/PT to assist with strengthening/mobility   - Keep Call bell within reach  - Keep bed low and locked with side rails adjusted as appropriate  - Keep care items and personal belongings within reach  - Initiate and maintain comfort rounds  - Make Fall Risk Sign visible to staff  - Offer Toileting every 2 Hours, in advance of need  - Initiate/Maintain 2 alarm  - Obtain necessary fall risk management equipment: walker  - Apply yellow socks and bracelet for high fall risk patients  - Consider moving patient to room near nurses station  Outcome: Progressing     Problem: Nutrition/Hydration-ADULT  Goal: Nutrient/Hydration intake appropriate for improving, restoring or maintaining nutritional needs  Description: Monitor and assess patient's nutrition/hydration status for malnutrition  Collaborate with interdisciplinary team and initiate plan and interventions as ordered  Monitor patient's weight and dietary intake as ordered or per policy  Utilize nutrition screening tool and intervene as necessary  Determine patient's food preferences and provide high-protein, high-caloric foods as appropriate       INTERVENTIONS:  - Monitor oral intake, urinary output, labs, and treatment plans  - Assess nutrition and hydration status and recommend course of action  - Evaluate amount of meals eaten  - Assist patient with eating if necessary   - Allow adequate time for meals  - Recommend/ encourage appropriate diets, oral nutritional supplements, and vitamin/mineral supplements  - Order, calculate, and assess calorie counts as needed  - Recommend, monitor, and adjust tube feedings and TPN/PPN based on assessed needs  - Assess need for intravenous fluids  - Provide specific nutrition/hydration education as appropriate  - Include patient/family/caregiver in decisions related to nutrition  Outcome: Progressing     Problem: RESPIRATORY - ADULT  Goal: Achieves optimal ventilation and oxygenation  Description: INTERVENTIONS:  - Assess for changes in respiratory status  - Assess for changes in mentation and behavior  - Position to facilitate oxygenation and minimize respiratory effort  - Oxygen administered by appropriate delivery if ordered  - Initiate smoking cessation education as indicated  - Encourage broncho-pulmonary hygiene including cough, deep breathe, Incentive Spirometry  - Assess the need for suctioning and aspirate as needed  - Assess and instruct to report SOB or any respiratory difficulty  - Respiratory Therapy support as indicated  Outcome: Progressing     Problem: MOBILITY - ADULT  Goal: Maintain or return to baseline ADL function  Description: INTERVENTIONS:  -  Assess patient's ability to carry out ADLs; assess patient's baseline for ADL function and identify physical deficits which impact ability to perform ADLs (bathing, care of mouth/teeth, toileting, grooming, dressing, etc )  - Assess/evaluate cause of self-care deficits   - Assess range of motion  - Assess patient's mobility; develop plan if impaired  - Assess patient's need for assistive devices and provide as appropriate  - Encourage maximum independence but intervene and supervise when necessary  - Involve family in performance of ADLs  - Assess for home care needs following discharge   - Consider OT consult to assist with ADL evaluation and planning for discharge  - Provide patient education as appropriate  Outcome: Progressing  Goal: Maintains/Returns to pre admission functional level  Description: INTERVENTIONS:  - Perform BMAT or MOVE assessment daily    - Set and communicate daily mobility goal to care team and patient/family/caregiver     - Collaborate with rehabilitation services on mobility goals if consulted  - Perform Range of Motion 3 times a day  - Reposition patient every 2 hours    - Dangle patient 3 times a day  - Stand patient 3 times a day  - Ambulate patient 3 times a day  - Out of bed to chair 3 times a day   - Out of bed for meals 3 times a day  - Out of bed for toileting  - Record patient progress and toleration of activity level   Outcome: Progressing     Problem: Prexisting or High Potential for Compromised Skin Integrity  Goal: Skin integrity is maintained or improved  Description: INTERVENTIONS:  - Identify patients at risk for skin breakdown  - Assess and monitor skin integrity  - Assess and monitor nutrition and hydration status  - Monitor labs   - Assess for incontinence   - Turn and reposition patient  - Assist with mobility/ambulation  - Relieve pressure over bony prominences  - Avoid friction and shearing  - Provide appropriate hygiene as needed including keeping skin clean and dry  - Evaluate need for skin moisturizer/barrier cream  - Collaborate with interdisciplinary team   - Patient/family teaching  - Consider wound care consult   Outcome: Progressing

## 2022-05-04 NOTE — TELEPHONE ENCOUNTER
Manuel Morrell from [address] [place] [address] called, requesting a call back from on call provider to review new admission orders  Provider paged via TC

## 2022-05-05 ENCOUNTER — PATIENT OUTREACH (OUTPATIENT)
Dept: FAMILY MEDICINE CLINIC | Facility: CLINIC | Age: 77
End: 2022-05-05

## 2022-05-05 ENCOUNTER — NURSING HOME VISIT (OUTPATIENT)
Dept: GERIATRICS | Facility: OTHER | Age: 77
End: 2022-05-05
Payer: MEDICARE

## 2022-05-05 ENCOUNTER — TELEPHONE (OUTPATIENT)
Dept: NEUROLOGY | Facility: CLINIC | Age: 77
End: 2022-05-05

## 2022-05-05 DIAGNOSIS — J96.11 CHRONIC RESPIRATORY FAILURE WITH HYPOXIA (HCC): Chronic | ICD-10-CM

## 2022-05-05 DIAGNOSIS — E88.01 ALPHA-1-ANTITRYPSIN DEFICIENCY (HCC): ICD-10-CM

## 2022-05-05 DIAGNOSIS — I10 ESSENTIAL HYPERTENSION: ICD-10-CM

## 2022-05-05 DIAGNOSIS — F33.9 DEPRESSION, RECURRENT (HCC): ICD-10-CM

## 2022-05-05 DIAGNOSIS — J43.2 CENTRILOBULAR EMPHYSEMA (HCC): Chronic | ICD-10-CM

## 2022-05-05 DIAGNOSIS — J44.1 COPD WITH ACUTE EXACERBATION (HCC): Primary | ICD-10-CM

## 2022-05-05 DIAGNOSIS — G20 PARKINSON'S DISEASE (HCC): ICD-10-CM

## 2022-05-05 DIAGNOSIS — R26.2 AMBULATORY DYSFUNCTION: ICD-10-CM

## 2022-05-05 LAB
AORTIC ROOT: 4 CM
APICAL FOUR CHAMBER EJECTION FRACTION: 41 %
AV LVOT PEAK GRADIENT: 5 MMHG
AV PEAK GRADIENT: 11 MMHG
DOP CALC LVOT AREA: 3.46 CM2
DOP CALC LVOT DIAMETER: 2.1 CM
E WAVE DECELERATION TIME: 240 MS
FRACTIONAL SHORTENING: 21 % (ref 28–44)
INTERVENTRICULAR SEPTUM IN DIASTOLE (PARASTERNAL SHORT AXIS VIEW): 1 CM
INTERVENTRICULAR SEPTUM: 1 CM (ref 0.54–1.01)
LAAS-AP2: 15.1 CM2
LAAS-AP4: 13.8 CM2
LEFT ATRIUM AREA SYSTOLE SINGLE PLANE A4C: 15.3 CM2
LEFT ATRIUM SIZE: 3.3 CM
LEFT INTERNAL DIMENSION IN SYSTOLE: 3 CM (ref 3.17–4.79)
LEFT VENTRICULAR INTERNAL DIMENSION IN DIASTOLE: 3.8 CM (ref 5.22–7.77)
LEFT VENTRICULAR POSTERIOR WALL IN END DIASTOLE: 1.1 CM (ref 0.53–1)
LEFT VENTRICULAR STROKE VOLUME: 25 ML
LVSV (TEICH): 25 ML
MV E'TISSUE VEL-LAT: 7 CM/S
MV E'TISSUE VEL-SEP: 9 CM/S
MV PEAK A VEL: 1.04 M/S
MV PEAK E VEL: 64 CM/S
MV STENOSIS PRESSURE HALF TIME: 70 MS
MV VALVE AREA P 1/2 METHOD: 3.14 CM2
RIGHT ATRIUM AREA SYSTOLE A4C: 25.8 CM2
RIGHT VENTRICLE ID DIMENSION: 3.1 CM
SL CV LEFT ATRIUM LENGTH A2C: 4.7 CM
SL CV PED ECHO LEFT VENTRICLE DIASTOLIC VOLUME (MOD BIPLANE) 2D: 60 ML
SL CV PED ECHO LEFT VENTRICLE SYSTOLIC VOLUME (MOD BIPLANE) 2D: 36 ML
TRICUSPID VALVE PEAK REGURGITATION VELOCITY: 3.19 M/S
Z-SCORE OF INTERVENTRICULAR SEPTUM IN END DIASTOLE: 1.85
Z-SCORE OF LEFT VENTRICULAR DIMENSION IN END DIASTOLE: -5.16
Z-SCORE OF LEFT VENTRICULAR DIMENSION IN END SYSTOLE: -2.06
Z-SCORE OF LEFT VENTRICULAR POSTERIOR WALL IN END DIASTOLE: 2.79

## 2022-05-05 PROCEDURE — 99306 1ST NF CARE HIGH MDM 50: CPT | Performed by: FAMILY MEDICINE

## 2022-05-05 PROCEDURE — 93306 TTE W/DOPPLER COMPLETE: CPT | Performed by: INTERNAL MEDICINE

## 2022-05-05 NOTE — PROGRESS NOTES
Outgoing call  05/05/2022    [facility] called patient to [phone] from [phone] to reschedule the HV, no answer, left message      Next outreach is scheduled for 05/10/2022

## 2022-05-05 NOTE — PROGRESS NOTES
[address]  [address] [facility], [address] [facility] 31  History and Physical    NAME: Xiao Feliciano  AGE: 65 y o  SEX: male [id number]    DATE OF ENCOUNTER: 5/5/2022    Code status:  CPR    Assessment and Plan     1  COPD with acute exacerbation (HCC)  - stable  - cont Fluticasone nasal spray  - cont prednisone taper  - cont Duonebs 4 times daily    2  Chronic respiratory failure with hypoxia (HCC)  - on chronic 3 lit of O2 at baseline   - cont Budesonide inh bid    3  Centrilobular emphysema (HCC)  - stable    4  Ambulatory dysfunction  - PT/OT ordered  - Fall precautions in place    5  Alpha-1-antitrypsin deficiency ([facility] [address] )  - gets injections    6  Depression, recurrent (HCC)  - cont mirtazapine 15 mg po qd  - cont Buspirone 10 mg tid    7  Essential hypertension/CHF  - cont daily weights  - cont Metoprolol tartrate 25 mg po bid   - cont Amlodipine 10 mg po qd    8  Parkinson's disease ([facility] [address] )  - cont Carbidopa-levodopa  mg po tid      All medications and routine orders were reviewed and updated as needed  Plan discussed with: patient and staff    Chief Complaint     Seen for admission at [address] [place] [address], a 64 y/o male with PMH of centrilobular emphysema, on 2 lit of home O2, COPD, GERD, HTN, Parkinson's dx, depression, BPH got admitted to the hospital with worsening SOB  He was placed on Bipap, later changed to 3 lit of O2, He was diagnosed as COPD exacerbation, treated with duonebs, budesonide, fluticasone and steroid taper  His symptoms did not improve, then Cardiology was consulted, concerning possible ischemia and pulm hypertension  But cardiac cath doesnot show any abnormalities  He was discharged to  for subacute rehab  He was seen and examined at bedside, stable  He is able to give good history  He lives at home alone, independent of ADLs and IADLs  He uses 3 lit of O2 via [place]  He uses cane occasionally  He is c/o gen weakness, no other c/o at this time  Staff have no concerns at this time  HISTORY:  Past Medical History:   Diagnosis Date    Anesthesia complication     Difficult to wake up    Arthritis     BPH (benign prostatic hyperplasia)     urinary frequency    Cancer (HCC)     basal cell neck, face    COPD (chronic obstructive pulmonary disease) (HCC)     COPD exacerbation ([facility] ) 2019    Full dentures     Hiatal hernia     History of methicillin resistant staphylococcus aureus (MRSA)     10/11/2018 MRSA (nares) positive    Hypertension     Irritable bowel syndrome     Kidney stone     at least 7 episodes    Liver disease     Alpha 1- enzyme deficiency - diagnosed   has been on weekly replacement therapy since then    Pulmonary emphysema ([facility] )     1/25/15  FEV1 - 2 45 liters or 59% of predicted    RSV infection 2017    Wears glasses     for driving only     Family History   Problem Relation Age of Onset    Emphysema Mother         never smoked    Emphysema Father     Cancer Brother         colon    Colon cancer Brother     Ulcerative colitis Family     Liver disease Family      Social History     Socioeconomic History    Marital status:      Spouse name: Not on file    Number of children: Not on file    Years of education: Not on file    Highest education level: Not on file   Occupational History    Not on file   Tobacco Use    Smoking status: Former Smoker     Packs/day: 1 00     Years: 60 00     Pack years: 60 00     Quit date: 2017     Years since quittin 6    Smokeless tobacco: Never Used    Tobacco comment: quit in 2017   Vaping Use    Vaping Use: Never used   Substance and Sexual Activity    Alcohol use: Not Currently     Comment: stopped in     Drug use: Not Currently    Sexual activity: Not on file   Other Topics Concern    Not on file   Social History Narrative    Patient is    He has three adult children; 1 daughter and 2 sons  His daughter Tanna Canales lives closest [place] and is very involved w/ his care  Patient is not Faith  He lives alone  Social Determinants of Health     Financial Resource Strain: Not on file   Food Insecurity: No Food Insecurity    Worried About Running Out of Food in the Last Year: Never true    Vi of Food in the Last Year: Never true   Transportation Needs: No Transportation Needs    Lack of Transportation (Medical): No    Lack of Transportation (Non-Medical): No   Physical Activity: Not on file   Stress: Not on file   Social Connections: Not on file   Intimate Partner Violence: Not on file   Housing Stability: Low Risk     Unable to Pay for Housing in the Last Year: No    Number of Places Lived in the Last Year: 1    Unstable Housing in the Last Year: No       Allergies: Allergies   Allergen Reactions    Chantix [Varenicline]      Caused prostate infection       Review of Systems     Review of Systems   Constitutional: Positive for activity change and fatigue  HENT: Negative for hearing loss  Eyes: Negative  Respiratory: Negative  Cardiovascular: Negative  Gastrointestinal: Negative  Genitourinary: Negative  Musculoskeletal: Positive for arthralgias and gait problem  Neurological: Positive for weakness  Psychiatric/Behavioral: Negative  All other systems reviewed and are negative  As in HPI  Medications and orders     All medications reviewed and updated in Nursing Home EMR  Objective     Vitals: T: 97 3, P: 62, R: 16, BP: 135/70, 97% on RA, Wt: 190 lbs    Physical Exam  Vitals and nursing note reviewed  Constitutional:       General: He is not in acute distress  Appearance: He is well-developed  He is not diaphoretic  Comments: Thin built male   HENT:      Head: Normocephalic and atraumatic  Nose: Nose normal       Mouth/Throat:      Mouth: Mucous membranes are moist       Pharynx: Oropharynx is clear   No oropharyngeal exudate  Eyes:      General: No scleral icterus  Right eye: No discharge  Left eye: No discharge  Extraocular Movements: Extraocular movements intact  Conjunctiva/sclera: Conjunctivae normal    Cardiovascular:      Rate and Rhythm: Normal rate and regular rhythm  Heart sounds: Normal heart sounds  No murmur heard  Pulmonary:      Effort: Pulmonary effort is normal  No respiratory distress  Breath sounds: Normal breath sounds  No wheezing  Chest:      Chest wall: No tenderness  Abdominal:      General: Bowel sounds are normal  There is no distension  Palpations: Abdomen is soft  Tenderness: There is no abdominal tenderness  There is no guarding  Musculoskeletal:         General: No tenderness or deformity  Normal range of motion  Cervical back: Normal range of motion and neck supple  Right lower leg: No edema  Left lower leg: No edema  Skin:     General: Skin is warm and dry  Neurological:      Mental Status: He is alert and oriented to person, place, and time  Cranial Nerves: No cranial nerve deficit  Motor: No abnormal muscle tone  Coordination: Coordination normal    Psychiatric:         Mood and Affect: Mood normal          Behavior: Behavior normal          Pertinent Laboratory/Diagnostic Studies: The following labs/studies were reviewed please see chart or hospital paperwork for details    Ref Range & Units 5/4/22 0526 5/3/22 0519 5/2/22 0434 5/1/22 0527 4/30/22 0600 4/29/22 0624 4/28/22 1122    WBC 4 31 - 10 16 Thousand/uL 8 38  12 13 High   11 41 High   11 88 High   7 87  11 57 High   6 32    RBC 3 88 - 5 62 Million/uL 4 49  4 24  4 36  4 35  4 55  4 43  4 68    Hemoglobin 12 0 - 17 0 g/dL 14 1  13 6  14 0  13 7  14 4  14 3  14 7    Hematocrit 36 5 - 49 3 % 43 0  40 3  41 5  41 1  42 8  41 6  44 1    MCV 82 - 98 fL 96  95  95  95  94  94  94    MCH 26 8 - 34 3 pg 31 4  32 1  32 1  31 5  31 6  32 3  31 4    MCHC 31 4 - 37 4 g/dL 32 8  33 7  33 7  33 3  33 6  34 4  33 3    RDW 11 6 - 15 1 % 13 6  13 8  13 6  13 6  13 6  13 6  13 9    MPV 8 9 - 12 7 fL 8 9  9 6  9 3  9 3  8 7 Low   8 7 Low   9 0    Platelets 956 - 774 Thousands/uL 111 Low   117 Low   131 Low   136 Low   145 Low  CM  132 Low   118 Low     nRBC    0 R  0 R   0 R  0 R    Lymphocytes Absolute    1 26 R  1 21 R   1 68 R  2 78 R    Monocytes Absolute    0 50 R  0 49 R   0 60 R  0 40 R    Eosinophils Absolute    0 00 R  0 00 R   0 00 R  0 19 R    Basophils Absolute    0 02 R  0 01 R   0 01 R  0 04 R    Neutrophils Relative    83 High  R  84 High  R   79 High  R  46 R    Immat GRANS %    2 R  2 R   1 R  0 R    Lymphocytes Relative    11 Low  R  10 Low  R   15 R  44 R    Monocytes Relative    4 R  4 R   5 R  6 R    Eosinophils Relative    0 R  0 R   0 R  3 R    Basophils Relative    0 R  0 R   0 R  1 R    Neutrophils Absolute    9 36 High  R  9 96 High  R   9 22 High  R  2 89 R    Immature Grans Absolute    0         Ref Range & Units 5/4/22 0526 5/2/22 0434 5/1/22 0527 4/30/22 0600 4/29/22 0512 4/28/22 1122 4/21/22 1344    Sodium 136 - 145 mmol/L 137  140  138  138  139  141  144    Potassium 3 5 - 5 3 mmol/L 4 8  4 5  4 3  4 4  4 4  4 2  4 2    Chloride 100 - 108 mmol/L 104  106  105  104  103  103  107    CO2 21 - 32 mmol/L 26  28  24  26  27  30  24    ANION GAP 4 - 13 mmol/L 7  6  9  8  9  8  13    BUN 5 - 25 mg/dL 31 High   28 High   27 High   23  23  21  11    Creatinine 0 60 - 1 30 mg/dL 1 02  1 11 CM  1 04 CM  1 08 CM  1 07 CM  1 23 CM  1 20 CM    Comment: Standardized to IDMS reference method   Glucose 65 - 140 mg/dL 131  127 CM  159 High  CM  161 High  CM  127 CM  115 CM  84 CM       Calcium 8 3 - 10 1 mg/dL 8 2 Low   8 4  8 7  8 9  9 3          - Counseling Documentation: patient was counseled regarding: risk factor reductions

## 2022-05-05 NOTE — TELEPHONE ENCOUNTER
Patient has a upcoming appointment on 05/16/22 @ 1:30pm, the patient states that he needs to have a lyft ride set up  The patient needs to have  transportation to and from his appointment      Please arrange

## 2022-05-06 ENCOUNTER — PATIENT OUTREACH (OUTPATIENT)
Dept: FAMILY MEDICINE CLINIC | Facility: CLINIC | Age: 77
End: 2022-05-06

## 2022-05-06 NOTE — TELEPHONE ENCOUNTER
Amarilis Marcial from [place] requested a call back [phone]  MSW phoned Amarilis Marcial from [facility] and she informed that they are able to provide transportation to patient's medical appt with Dr Ruby Johnson at [place] [address]  appt information was shared  Amarilis Marcial will meet with patient on Monday to discuss transportation and make him aware that they can take him to his medical appointments

## 2022-05-06 NOTE — PROGRESS NOTES
Incoming call  05/06/2022    [facility] received returned phone call from patient, patient indicated he was still in  [address] and should be home sometime next week  [facility] asked patient if it was ok I called him next week to rescheduled or if he needed more time, patient indicated he was ok with me calling the end of next week to rescheduled our home visit       Next outreach is scheduled for 05/13/2022

## 2022-05-06 NOTE — TELEPHONE ENCOUNTER
Chart review patient was discharged from Hospital on 5/4/2022 [address] [place]  MSW phoned [address] at [phone] and was transferred to   [facility]  MSW left detailed vm requesting to learn if patient continues to be there  Patient has upcoming appt with Neurology on 5/16 to learn if they can provide transportation to medical appt  Awaiting on a call back

## 2022-05-09 ENCOUNTER — NURSING HOME VISIT (OUTPATIENT)
Dept: GERIATRICS | Facility: OTHER | Age: 77
End: 2022-05-09
Payer: MEDICARE

## 2022-05-09 ENCOUNTER — PATIENT OUTREACH (OUTPATIENT)
Dept: FAMILY MEDICINE CLINIC | Facility: CLINIC | Age: 77
End: 2022-05-09

## 2022-05-09 VITALS
RESPIRATION RATE: 18 BRPM | OXYGEN SATURATION: 99 % | TEMPERATURE: 97.7 F | SYSTOLIC BLOOD PRESSURE: 165 MMHG | DIASTOLIC BLOOD PRESSURE: 72 MMHG | HEART RATE: 60 BPM | BODY MASS INDEX: 20.51 KG/M2 | WEIGHT: 173 LBS

## 2022-05-09 DIAGNOSIS — G20 PARKINSON'S DISEASE (HCC): ICD-10-CM

## 2022-05-09 DIAGNOSIS — N40.1 BENIGN PROSTATIC HYPERPLASIA WITH URINARY OBSTRUCTION: ICD-10-CM

## 2022-05-09 DIAGNOSIS — J43.2 CENTRILOBULAR EMPHYSEMA (HCC): Primary | Chronic | ICD-10-CM

## 2022-05-09 DIAGNOSIS — N13.8 BENIGN PROSTATIC HYPERPLASIA WITH URINARY OBSTRUCTION: ICD-10-CM

## 2022-05-09 DIAGNOSIS — I10 ESSENTIAL HYPERTENSION: ICD-10-CM

## 2022-05-09 DIAGNOSIS — G62.9 NEUROPATHY: ICD-10-CM

## 2022-05-09 PROCEDURE — 99316 NF DSCHRG MGMT 30 MIN+: CPT | Performed by: NURSE PRACTITIONER

## 2022-05-09 NOTE — PROGRESS NOTES
[facility]  [address]  [phone]  [address] [facility]: [place]  Code 32    NAME: Tawnya Sahu  AGE: 65 y o  SEX: male   CODE STATUS: CPR    DATE OF ADMISSION: 5/4/2022   DATE OF DISCHARGE: 5/11/2022   DISCHARGE DISPOSITION: Stable for discharge to home with family support and home health PT/OT/SN services  Reason for Admission: Patient was admitted to [place] for rehabilitation after hospitalization for COPD exacerbation  Past Medical and Surgical History:   Past Medical History:   Diagnosis Date    Anesthesia complication     Difficult to wake up    Arthritis     BPH (benign prostatic hyperplasia)     urinary frequency    Cancer (HCC)     basal cell neck, face    COPD (chronic obstructive pulmonary disease) (HCC)     COPD exacerbation ([facility] [address] ) 12/29/2019    Full dentures     Hiatal hernia     History of methicillin resistant staphylococcus aureus (MRSA)     10/11/2018 MRSA (nares) positive    Hypertension     Irritable bowel syndrome     Kidney stone     at least 7 episodes    Liver disease     Alpha 1- enzyme deficiency - diagnosed 2002  has been on weekly replacement therapy since then    Pulmonary emphysema ([facility] [address] )     1/25/15  FEV1 - 2 45 liters or 59% of predicted    RSV infection 12/2017    Wears glasses     for driving only      Past Surgical History:   Procedure Laterality Date    BACK SURGERY  2008    discectomy    COLONOSCOPY      COLONOSCOPY N/A 3/10/2017    Procedure: Gómez Stands;  Surgeon: Adeline Titus MD;  Location: [place] [address] GI LAB; Service:    Eladio Deras      removal of kidney stones    ESOPHAGOGASTRODUODENOSCOPY N/A 3/10/2017    Procedure: ESOPHAGOGASTRODUODENOSCOPY (EGD); Surgeon: Adeline Titus MD;  Location: [facility] GI LAB; Service:     LITHOTRIPSY      IA ESOPHAGOGASTRODUODENOSCOPY TRANSORAL DIAGNOSTIC N/A 11/20/2018    Procedure: ESOPHAGOGASTRODUODENOSCOPY (EGD);   Surgeon: Adeline Titus MD; Location: [place] [address] GI LAB; Service: Gastroenterology    TONSILLECTOMY      VEIN LIGATION AND STRIPPING Bilateral     1980's       Course of stay:   Aruna Pope  is a 65year old male admitted to [address] [place] [address] on 5/4/2022 for STR  Past Medical Hx including but not limited to Empysema due to Alpha-1 antitrypsin deficiency, GERD, chroinic diarrhea, HTN, Pulmonary HTN, Parkinson's disease, Orthostatic hypotension, BPH, ambulatory dysfucion and debility  seen in collaboration with nursing for medical mgmt and Discharge visit  Hospital Course:   Presented to [address] [place] [address] with COPD exacerbation  He was placed on Bipap, later changed to 3 lit of O2, He was diagnosed as COPD exacerbation, treated with duonebs, budesonide, fluticasone and steroid taper  His symptoms did not improve, then Cardiology was consulted, concerning possible ischemia and pulm hypertension  But cardiac cath doesnot show any abnormalities  He was discharged to  for subacute rehab  Rehab:   Seen and examined at bedside today  Patient is a reliable historian  He states he still short of breath however it is better than when he was in the hospital   Patient continues on home oxygen at 3 L nasal cannula  Patient states he needs to be discharged from rehab on Wednesday and that his daughter will be picking him up   made aware  Patient states his daughter is going away for 2 weeks and she is his [place] ride home  Patient states he feels ready to be discharged to home  Patient is agreeable to continuing therapy with home health services  Patient denies chest pain or palpitations  Patient denies any nausea vomiting or diarrhea  Patient states he has staying hydrated and his appetite is fair  He denies any bowel or bladder issues  He states he has chronic diarrhea that this be controlled with cholestyramine packets  No concerns from nursing at this time                        ROS:  Review of Systems   Constitutional: Negative for activity change, appetite change, fatigue and fever  HENT: Negative for ear pain, rhinorrhea and trouble swallowing  Eyes: Negative for pain and visual disturbance  Respiratory: Positive for shortness of breath  Negative for cough and wheezing  Cardiovascular: Negative for chest pain and palpitations  Gastrointestinal: Negative for abdominal distention, abdominal pain, blood in stool, constipation, diarrhea, nausea and vomiting  Genitourinary: Negative for decreased urine volume, difficulty urinating, dysuria, frequency and hematuria  Musculoskeletal: Negative for arthralgias, back pain and gait problem  Skin: Negative for color change and rash  Neurological: Positive for weakness  Negative for dizziness, syncope, light-headedness, numbness and headaches  Psychiatric/Behavioral: Negative for dysphoric mood and sleep disturbance  PHYSICAL EXAM:  VITALS:   Vitals:    05/09/22 1100   BP: 165/72   Pulse: 60   Resp: 18   Temp: 97 7 °F (36 5 °C)   SpO2: 99%        Physical Exam  Vitals and nursing note reviewed  Constitutional:       General: He is not in acute distress  Appearance: Normal appearance  HENT:      Head: Normocephalic and atraumatic  Nose: No congestion or rhinorrhea  Mouth/Throat:      Mouth: Mucous membranes are moist    Eyes:      General: No scleral icterus  Extraocular Movements: Extraocular movements intact  Conjunctiva/sclera: Conjunctivae normal       Pupils: Pupils are equal, round, and reactive to light  Cardiovascular:      Rate and Rhythm: Normal rate and regular rhythm  Pulses: Normal pulses  Heart sounds: Normal heart sounds  No murmur heard  Pulmonary:      Effort: Pulmonary effort is normal       Breath sounds: Normal breath sounds  No wheezing, rhonchi or rales  Abdominal:      General: Bowel sounds are normal  There is no distension  Palpations: Abdomen is soft  Tenderness:  There is no abdominal tenderness  Musculoskeletal:         General: No swelling or signs of injury  Lymphadenopathy:      Cervical: No cervical adenopathy  Skin:     General: Skin is warm and dry  Findings: No erythema  Neurological:      Mental Status: He is alert and oriented to person, place, and time  Sensory: No sensory deficit  Motor: Weakness present  Gait: Gait normal    Psychiatric:         Mood and Affect: Mood normal          Behavior: Behavior normal          Admission Diagnoses:   1  Centrilobular emphysema ([facility] )  Assessment & Plan:  · Hx of alpha-1-antitrypsin deficiency  · Recent COPD exacerbation causing hospital admission- treated with Nebx, inhalers, steroid taper  · Initally requireed Bipap - now back on home 02   ·   · Plan:   · Continue stroid taper til 5/17  · Continue duonebs qid  · Continue albuterol prn  · Continue budesonide  bid    Orders:  -     budesonide (PULMICORT) 0 5 mg/2 mL nebulizer solution; Take 2 mL (0 5 mg total) by nebulization in the morning and 2 mL (0 5 mg total) in the evening   -     cholestyramine (QUESTRAN) 4 g packet; Take 1 packet (4 g total) by mouth in the morning   -     predniSONE 10 mg tablet; Take 2 tablets (20 mg total) by mouth daily for 1 day, THEN 1 tablet (10 mg total) daily for 3 days, THEN 0 5 tablets (5 mg total) daily for 3 days  2  Parkinson's disease ([place] [address] )  Assessment & Plan:  · Possible worsening/progression of disease per Hospital records  · Patient states he has been taking his sinemet as prescribed   · Follows with  Neurology   · Continue Carbidopa levodopa  mg TID     Orders:  -     carbidopa-levodopa (SINEMET)  mg per tablet; Take 1 tablet by mouth in the morning and 1 tablet in the evening and 1 tablet before bedtime  3  Neuropathy  Comments:  Gabapentin will be tried to see if it helps  Orders:  -     gabapentin (NEURONTIN) 300 mg capsule;  Take 1 capsule (300 mg total) by mouth in the morning and 1 capsule (300 mg total) in the evening and 1 capsule (300 mg total) before bedtime  4  Essential hypertension  Assessment & Plan:  · Stable   · Continue norvasc and metoprolol   · Due to parkinson's continue midodrine - hold for sbp greater than 140   · OP f/u with PCP     Orders:  -     metoprolol tartrate (LOPRESSOR) 25 mg tablet; Take 1 tablet (25 mg total) by mouth every 12 (twelve) hours    5  Benign prostatic hyperplasia with urinary obstruction  Assessment & Plan:  · Stable   · Denies symptoms on exam  · Continue flomax and finasteride     Orders:  -     tamsulosin (FLOMAX) 0 4 mg; Take 1 capsule (0 4 mg total) by mouth in the morning  Follow-up Recommendations:     Outpatient Follow up with PCP in the next 2 weeks  [address] [place] [address] PT/OT/SN services     Labs and testing performed during stay:    Collection Date:05/09/2022 31:99  BASIC METABOLIC PNL  GLUCOSE 713 mg/dL 65-99 H Final  BUN 27 mg/dL 7-28 Final  CREATININE 0 91 mg/dL 0 53-1 30 Final  SODIUM 141 mmol/L 135-145 Final  POTASSIUM 4 5 mmol/L 3 5-5 2 Final  CHLORIDE 106 mmol/L 100-109 Final  CARBON DIOXIDE 28 mmol/L 23-31 Final  CALCIUM 9 0 mg/dL 8 5-10 1 Final  ANION GAP 7 3-11 Final  eGFRcr 86 >59 Final    Discharge Medications: See discharge medication list which was reviewed and signed  Current Outpatient Medications:     budesonide (PULMICORT) 0 5 mg/2 mL nebulizer solution, Take 2 mL (0 5 mg total) by nebulization in the morning and 2 mL (0 5 mg total) in the evening , Disp: 360 mL, Rfl: 11    carbidopa-levodopa (SINEMET)  mg per tablet, Take 1 tablet by mouth in the morning and 1 tablet in the evening and 1 tablet before bedtime  , Disp: 90 tablet, Rfl: 0    cholestyramine (QUESTRAN) 4 g packet, Take 1 packet (4 g total) by mouth in the morning , Disp: 60 each, Rfl: 0    gabapentin (NEURONTIN) 300 mg capsule, Take 1 capsule (300 mg total) by mouth in the morning and 1 capsule (300 mg total) in the evening and 1 capsule (300 mg total) before bedtime  , Disp: 90 capsule, Rfl: 0    metoprolol tartrate (LOPRESSOR) 25 mg tablet, Take 1 tablet (25 mg total) by mouth every 12 (twelve) hours, Disp: 60 tablet, Rfl: 0    [START ON 5/12/2022] predniSONE 10 mg tablet, Take 2 tablets (20 mg total) by mouth daily for 1 day, THEN 1 tablet (10 mg total) daily for 3 days, THEN 0 5 tablets (5 mg total) daily for 3 days  , Disp: 6 5 tablet, Rfl: 0    tamsulosin (FLOMAX) 0 4 mg, Take 1 capsule (0 4 mg total) by mouth in the morning , Disp: 30 capsule, Rfl: 0    amLODIPine (NORVASC) 10 mg tablet, TAKE ONE TABLET BY MOUTH DAILY , Disp: 30 tablet, Rfl: 6    busPIRone (BUSPAR) 10 mg tablet, TAKE ONE TABLET BY MOUTH THREE TIMES DAILY , Disp: 90 tablet, Rfl: 3    Diclofenac Sodium (VOLTAREN) 1 %, Apply 2 g topically 4 (four) times a day for 7 days Apply to R knee, Disp: 56 g, Rfl: 0    finasteride (PROSCAR) 5 mg tablet, TAKE ONE TABLET BY MOUTH IN THE MORNING , Disp: 90 tablet, Rfl: 3    fluticasone (FLONASE) 50 mcg/act nasal spray, 2 sprays into each nostril daily, Disp: 16 g, Rfl: 5    ipratropium-albuterol (DUO-NEB) 0 5-2 5 mg/3 mL nebulizer solution, Take 3 mL by nebulization 4 (four) times a day, Disp: 360 mL, Rfl: 5    midodrine (PROAMATINE) 10 MG tablet, Take 1 tab three times daily   Please hold medication if blood pressure is above 753 systolic , Disp: 90 tablet, Rfl: 4    mirtazapine (REMERON) 15 mg tablet, Take 1 tablet (15 mg total) by mouth daily at bedtime, Disp: 30 tablet, Rfl: 5    OXYGEN-HELIUM IN, Inhale 2L at rest- 3L with activity, Disp: , Rfl:     pantoprazole (PROTONIX) 40 mg tablet, TAKE ONE TABLET BY MOUTH TWICE DAILY, Disp: 180 tablet, Rfl: 0    Ventolin  (90 Base) MCG/ACT inhaler, Inhale 2 puffs every 4 (four) hours as needed for wheezing, Disp: 18 g, Rfl: 5    ZEMAIRA infusion, once a week , Disp: , Rfl:      Discussion with patient/family and further instructions:  -Fall precautions  -Aspiration precautions  -Bleeding precautions  -Monitor for signs/symptoms of infection  -Medication list was reviewed and updated    Status at time of discharge: Stable    Plan discussed with Dr Rivka Gallegos noted agreement with assessment and plan  Billing based on time  Time spent on unit, 40 minutes  Time spent counseling pt on debility/condition, 30 minutes  Please note:  Voice-recognition software may have been used in the preparation of this document  Occasional wrong word or "sound-alike" substitutions may have occurred due to the inherent limitations of voice recognition software  Interpretation should be guided by context          TOPHER Neri  5/9/2022

## 2022-05-09 NOTE — PROGRESS NOTES
CARLENE had completed a chart review  Per chart, patient was in the ED until 5/4  Per chart, patient was discharged to [employer] at [facility]  GAY had called the patient via phone  Patient told CARLENE that he continues to be at the facility  Patient stated his daughter, Jadyn Vasques plans to pick him up and take him home on Wednesday 5/11  Patient stated his [place] is going [place] vacation to [place] so this is the only time she can pick him up and drop him off  GAY informed the patient that she would let DRE Simeon know of this so she can coordinate schedule  Per chart, GAY Silver for [employer] is coordinating a Lyft ride for his appointment on 5/16 at 1:20pm  [facility] had routed note to [facility] will continue to f/u

## 2022-05-09 NOTE — ASSESSMENT & PLAN NOTE
· Hx of alpha-1-antitrypsin deficiency  · Recent COPD exacerbation causing hospital admission- treated with Nebx, inhalers, steroid taper  · Initally requireed Bipap - now back on home 02   ·   · Plan:   · Continue stroid taper til 5/17  · Continue duonebs qid  · Continue albuterol prn  · Continue budesonide  bid

## 2022-05-11 ENCOUNTER — TRANSCRIBE ORDERS (OUTPATIENT)
Dept: HOME HEALTH SERVICES | Facility: HOME HEALTHCARE | Age: 77
End: 2022-05-11

## 2022-05-11 ENCOUNTER — HOME HEALTH ADMISSION (OUTPATIENT)
Dept: HOME HEALTH SERVICES | Facility: HOME HEALTHCARE | Age: 77
End: 2022-05-11
Payer: MEDICARE

## 2022-05-11 ENCOUNTER — NURSING HOME VISIT (OUTPATIENT)
Dept: WOUND CARE | Facility: HOSPITAL | Age: 77
End: 2022-05-11
Payer: MEDICARE

## 2022-05-11 DIAGNOSIS — Z98.890 H/O CARDIAC CATHETERIZATION: Primary | ICD-10-CM

## 2022-05-11 DIAGNOSIS — R26.2 AMBULATORY DYSFUNCTION: ICD-10-CM

## 2022-05-11 DIAGNOSIS — J44.1 COPD WITH EXACERBATION (HCC): Primary | ICD-10-CM

## 2022-05-11 PROCEDURE — 99304 1ST NF CARE SF/LOW MDM 25: CPT | Performed by: NURSE PRACTITIONER

## 2022-05-11 RX ORDER — PREDNISONE 10 MG/1
TABLET ORAL
Qty: 6.5 TABLET | Refills: 0 | Status: SHIPPED | OUTPATIENT
Start: 2022-05-12 | End: 2022-05-19

## 2022-05-11 RX ORDER — GABAPENTIN 300 MG/1
300 CAPSULE ORAL 3 TIMES DAILY
Qty: 90 CAPSULE | Refills: 0 | Status: SHIPPED | OUTPATIENT
Start: 2022-05-11 | End: 2022-05-13

## 2022-05-11 RX ORDER — BUDESONIDE 0.5 MG/2ML
0.5 INHALANT ORAL 2 TIMES DAILY
Qty: 360 ML | Refills: 11 | Status: SHIPPED | OUTPATIENT
Start: 2022-05-11 | End: 2022-08-09

## 2022-05-11 RX ORDER — CHOLESTYRAMINE 4 G/9G
1 POWDER, FOR SUSPENSION ORAL DAILY
Qty: 60 EACH | Refills: 0 | Status: SHIPPED | OUTPATIENT
Start: 2022-05-11

## 2022-05-11 RX ORDER — TAMSULOSIN HYDROCHLORIDE 0.4 MG/1
0.4 CAPSULE ORAL DAILY
Qty: 30 CAPSULE | Refills: 0 | Status: SHIPPED | OUTPATIENT
Start: 2022-05-11 | End: 2022-05-12

## 2022-05-11 NOTE — ASSESSMENT & PLAN NOTE
· Stable   · Continue norvasc and metoprolol   · Due to parkinson's continue midodrine - hold for sbp greater than 140   · OP f/u with PCP

## 2022-05-11 NOTE — ASSESSMENT & PLAN NOTE
S/p Cardiac catheterization both left and right heart catheterization with vaso dilatory trial on 5/3/ 2022  - surgical wound on the right groin and right brachial - healed  - sign off

## 2022-05-11 NOTE — LETTER
Patient:  Sasha Macdonald   1945           TOPHER Colon saw Sasha Macdonald for a wound care visit on 5/11/2022  See below for information relating to this visit  Chief Complaint   Patient presents with   [address] Patient Visit     Right groin and right brachial        Assessment/Plan:  1  H/O cardiac catheterization  Assessment & Plan:  S/p Cardiac catheterization both left and right heart catheterization with vaso dilatory trial on 5/3/ 2022  - surgical wound on the right groin and right brachial - healed  - sign off    Orders:  -     Wound cleansing and dressings; Future    2  Ambulatory dysfunction  Assessment & Plan:  On STR             Orders:  Sasha Macdonald  1945  Orders Placed This Encounter   Procedures    Wound cleansing and dressings     No new wound order     Standing Status:   Future     Standing Expiration Date:   5/11/2023         Follow Up:  Return sign off  [address] hours are 8:00 am - 4:30 pm Monday through Friday  The center phone number is [phone]  You can also contact me directly thru my email at [facility]  [email or web address]  [email or web address] or thru tiger text  If it is an emergency, please contact the PCP or patient's attending physician in your facility       Sincerely,    Electronically signed by TOPHER Colon    Patient : Sasha Macdonald    1945

## 2022-05-11 NOTE — ASSESSMENT & PLAN NOTE
· Possible worsening/progression of disease per Hospital records  · Patient states he has been taking his sinemet as prescribed   · Follows with [facility]   · Continue Carbidopa levodopa  mg TID

## 2022-05-11 NOTE — PATIENT INSTRUCTIONS
Orders Placed This Encounter   Procedures    Wound cleansing and dressings     No new wound order     Standing Status:   Future     Standing Expiration Date:   5/11/2023

## 2022-05-12 ENCOUNTER — HOME CARE VISIT (OUTPATIENT)
Dept: HOME HEALTH SERVICES | Facility: HOME HEALTHCARE | Age: 77
End: 2022-05-12
Payer: MEDICARE

## 2022-05-12 VITALS
SYSTOLIC BLOOD PRESSURE: 138 MMHG | DIASTOLIC BLOOD PRESSURE: 60 MMHG | RESPIRATION RATE: 18 BRPM | OXYGEN SATURATION: 97 % | HEART RATE: 76 BPM

## 2022-05-12 LAB — CATH EF ESTIMATED: 53 %

## 2022-05-12 PROCEDURE — G0299 HHS/HOSPICE OF RN EA 15 MIN: HCPCS

## 2022-05-12 PROCEDURE — 400013 VN SOC

## 2022-05-12 PROCEDURE — 10330081 VN NO-PAY CLAIM PROCEDURE

## 2022-05-12 NOTE — TELEPHONE ENCOUNTER
MSW had communication with DRE Hernandez  Pt is not established yet with her as he has to complete an intake if patient is available tomorrow for a visit she may be able to obtain signature for waiver form from [employer] Gue[employer] in order for pt to have transporation to Neuro appt  Pt  Was made aware that he will be receiving a phone call from Ministerio   Patient reported that he will be available tomorrow

## 2022-05-12 NOTE — TELEPHONE ENCOUNTER
pt called and states that he has an appt on 5/16 with our office and states that he needs a ride  he states that he is no [facility] at [facility]  he got home last night    States that he can't come to appt unless we get him a ride    Please call pt at [phone] to leave detailed message

## 2022-05-12 NOTE — TELEPHONE ENCOUNTER
MSW phoned patient to discuss transporation to upcoming appt with Neurology  patient reported that he was at [address] and was suppose to be discharged in 2 weeks however he decided to signed himself out  His daughter pick him up form SNF and took him back home  Transportation to Neuro appt was already set up as [address] was going to transport him to his medical appointments  Patient reported that he is requesting a lyft drive as he was informed that he would be provided with transportation to his medical appointment  MSW informed patient that Niru Couch is utilized as a last resort and he will have to also be connected with [employer] transportation as they can provide long term transportation  Patient reported that his daughter cannot drive him to his medical appointments as she 'lives in the hospital' MSW clarified statement and patient shared that she works at the hospital and cannot provide transportation  MSW phoned [address] at [phone]  and spoke with Wesley Jean-Baptiste who confirmed that patient's daughter took him home yesterday  MSW inquired to learn more info about the case and if he signed himself out sooner than expected  Indira transferred call to [facility] and spoke with Ramirez Kim who informed that she could not find the patient in her system  MSW asked to be transferred to anyone who was familiarized with the case  Yvonne transferred the call to Wesley Jean-Baptiste who informed that Marry Alexandra helped with discharged and she transferred the call   MSW lvm to social service requesting a call back  MSW received incoming call from Jonatan Chapa from  who informed that patient did signed out sooner however in the discharged plan he was described to be 'Pretty independent'  Patient can walk 150 ft independently and 175 with help   4 steps supervision assistance   Mobility performance score 74 and it ranges from 50-90 (90being the highest) they did not have a discharge date yet for him   Jonatan Chapa thinks in possible 5 to 7 more days  MSW phoned [employer] and spoke with Meghan Andino who informed that in order for patient to ride lyft he must be able to self regulate oxygen and have a portable tank  They can provide transportation [facility]  MSW phoned patient who confirmed that he has a portable tank and can self regulate oxygen  Patient is not able to do video visit as he described himself as not being [employer] [facility]  Patient reported that he would like to go in person to appointment as he would like to start driving again  Patient reported that   "Testing is not doing anything, I don't get lost finding my way around"

## 2022-05-13 ENCOUNTER — PATIENT OUTREACH (OUTPATIENT)
Dept: FAMILY MEDICINE CLINIC | Facility: CLINIC | Age: 77
End: 2022-05-13

## 2022-05-13 DIAGNOSIS — J96.11 CHRONIC RESPIRATORY FAILURE WITH HYPOXIA (HCC): ICD-10-CM

## 2022-05-13 DIAGNOSIS — G62.9 NEUROPATHY: ICD-10-CM

## 2022-05-13 RX ORDER — GABAPENTIN 300 MG/1
CAPSULE ORAL
Qty: 90 CAPSULE | Refills: 0 | Status: SHIPPED | OUTPATIENT
Start: 2022-05-13 | End: 2022-06-22

## 2022-05-13 NOTE — TELEPHONE ENCOUNTER
MSW received lyft waiver from [address] [place] [address]  MSW phoned [employer] and scheduled Lyft transportation from patient's home to Neuro appt on 5/16/2022  12:20pm      MSW phoned patient and made him aware of same  Patient reported understanding

## 2022-05-13 NOTE — TELEPHONE ENCOUNTER
Transportation will be confirmed if patient signs lyft waiver  Abdoul Greenberg does not have to confirm transportation as this is a Neurology appointment  Abdoul Greenberg will try to help Neuro in obtaining signature for waiver form if she has the opportunity  If she can't obtain signature today, patient will have to be rescheduled until he is able to sign waiver     -Lyft waiver form was sent to patient via mail

## 2022-05-13 NOTE — PROGRESS NOTES
Outgoing call  05/13/2022  Outgoing call  05/13/2022    [facility] called patient RADHA [phone] [facility] [phone] to reschedule the HV, I have also received a request from his Neuro office Mj Null  if it was possible for [facility] to have patient sign the transportation waiver at our future visit, I aggred to get it signed for them from patient,patient and I agreed he was available today at 1:30pm for a HV, he completed questions over the phone and agreed to receiving my services as a [facility]     Next outreach is scheduled for 05/20/2022

## 2022-05-13 NOTE — TELEPHONE ENCOUNTER
Pt calling to check status of sandy kym  Advised him of previous and that Dysia would give him a call  Verbalizes understanding and will await phone call from Ministerio [address] [place] [address] [phone], ok to leave detailed message

## 2022-05-13 NOTE — PROGRESS NOTES
[address] [facility]   [facility]       Patient ID: Ivania Huddleston is a 65 y o  male Date of Birth 1945     Location of Service: [address] [place] [address]    Performed wound round with: Wound team       Chief Complaint   Patient presents with   [address] Patient Visit     Right groin and right brachial       Wound Instructions:  Orders Placed This Encounter   Procedures    Wound cleansing and dressings     No new wound order     Standing Status:   Future     Standing Expiration Date:   5/11/2023       Allergies  Chantix [varenicline]      Assessment & Plan:  1  H/O cardiac catheterization  Assessment & Plan:  S/p Cardiac catheterization both left and right heart catheterization with vaso dilatory trial on 5/3/ 2022  - surgical wound on the right groin and right brachial - healed  - sign off    Orders:  -     Wound cleansing and dressings; Future    2  Ambulatory dysfunction  Assessment & Plan:  On STR             Subjective: This is a 65-year-old male referred to our service for wound on the right groin and right brachial Patient have a complex medical history including but not limited to  centrilobular emphysema, on 2 lit of home O2, COPD, GERD, HTN, Parkinson's dx, depression, BPH got admitted to the hospital with worsening SOB    Patient was referred by Senior Care Team  Patient was seen in collaboration with the facility wound team      As per medical Record review, patient have cardiac catheterization on May 3, 2022  Wound does not have current treatment  Received patient in bed, seems comfortable  Denies pain  Patient can ambulate with minimal assistance  His content of both bowel in bladder  He have a good appetite  Review of Systems   Constitutional: Negative  HENT: Negative  Eyes: Negative  Respiratory: Negative  Cardiovascular: Negative  Gastrointestinal: Negative  Endocrine: Negative  Genitourinary: Negative      Musculoskeletal: Positive for gait problem  Skin: Positive for wound  See HPI   Hematological: Negative  Psychiatric/Behavioral: Negative  All other systems reviewed and are negative  Objective: There were no vitals taken for this visit  Physical Exam  Constitutional:       Appearance: Normal appearance  HENT:      Head: Normocephalic and atraumatic  Nose: Nose normal       Mouth/Throat:      Mouth: Mucous membranes are moist    Eyes:      Conjunctiva/sclera: Conjunctivae normal    Cardiovascular:      Rate and Rhythm: Normal rate  Pulmonary:      Effort: Pulmonary effort is normal    Abdominal:      Tenderness: There is no abdominal tenderness  Genitourinary:     Comments: continent  Musculoskeletal:      Cervical back: Normal range of motion  Comments: LROM   Skin:     Findings: Lesion present  Comments: Right groin - healed    Right brachial - healed   Neurological:      Mental Status: He is alert  Gait: Gait abnormal    Psychiatric:         Mood and Affect: Mood normal          Behavior: Behavior normal               Procedures           Patient's care was coordinated with nursing facility staff  Recent vitals, labs and updated medications were reviewed on EMR or chart system of facility   Past Medical, surgical, social, medication and allergy history and patient's previous records were reviewed and updated as appropriate:     Patient Active Problem List   Diagnosis    Alpha-1-antitrypsin deficiency ([address] )    Gastroesophageal reflux disease    Essential hypertension    Acute and chronic respiratory failure with hypoxia ([address] )    BPH (benign prostatic hyperplasia)    Liver disease    Former smoker    Chronic respiratory failure with hypoxia ([address] )    Centrilobular emphysema (HCC)    Elevated PSA    Herpes labialis    Chronic venous insufficiency    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 (HCC)    Insomnia    Pulmonary hypertension ([address] )    Other constipation    Ambulatory dysfunction    Allergic rhinitis    Benign colon polyp    Cataracts, both eyes    Chronic diarrhea     Peripheral neuropathy    Thrombocytopenia (HCC)    COPD with acute exacerbation (HCC)    Orthostatic hypotension with spine hypertension    Palliative care patient    Vitamin D deficiency disease    Avascular necrosis of hip, right (HCC)    Depression, recurrent (HCC)    Anxiousness    Parkinson's disease (HCC)    Cognitive impairment    Other secondary hypertension    Hypertensive urgency    Chest heaviness    H/O cardiac catheterization     Past Medical History:   Diagnosis Date    Anesthesia complication     Difficult to wake up    Arthritis     BPH (benign prostatic hyperplasia)     urinary frequency    Cancer (HCC)     basal cell neck, face    COPD (chronic obstructive pulmonary disease) (HCC)     COPD exacerbation ([facility] ) 12/29/2019    Full dentures     Hiatal hernia     History of methicillin resistant staphylococcus aureus (MRSA)     10/11/2018 MRSA (nares) positive    Hypertension     Irritable bowel syndrome     Kidney stone     at least 7 episodes    Liver disease     Alpha 1- enzyme deficiency - diagnosed 2002  has been on weekly replacement therapy since then    Pulmonary emphysema ([facility] )     1/25/15  FEV1 - 2 45 liters or 59% of predicted    RSV infection 12/2017    Wears glasses     for driving only     Past Surgical History:   Procedure Laterality Date    BACK SURGERY  2008    discectomy    COLONOSCOPY      COLONOSCOPY N/A 3/10/2017    Procedure: Melmarlena Stands;  Surgeon: Adeline Titus MD;  Location: [facility] GI LAB; Service:    Eladio Deras      removal of kidney stones    ESOPHAGOGASTRODUODENOSCOPY N/A 3/10/2017    Procedure: ESOPHAGOGASTRODUODENOSCOPY (EGD); Surgeon: Adeline Titus MD;  Location: [facility] GI LAB;   Service:     LITHOTRIPSY      NC ESOPHAGOGASTRODUODENOSCOPY TRANSORAL DIAGNOSTIC N/A 11/20/2018    Procedure: ESOPHAGOGASTRODUODENOSCOPY (EGD); Surgeon: Roxanna Chicas MD;  Location: [facility] GI LAB; Service: Gastroenterology    TONSILLECTOMY      VEIN LIGATION AND STRIPPING Bilateral          Social History     Socioeconomic History    Marital status:      Spouse name: None    Number of children: None    Years of education: None    Highest education level: None   Occupational History    None   Tobacco Use    Smoking status: Former Smoker     Packs/day: 1 00     Years: 60 00     Pack years: 60 00     Quit date: 2017     Years since quittin 7    Smokeless tobacco: Never Used    Tobacco comment: quit in 2017   Vaping Use    Vaping Use: Never used   Substance and Sexual Activity    Alcohol use: Not Currently     Comment: stopped in     Drug use: Not Currently    Sexual activity: None   Other Topics Concern    None   Social History Narrative    Patient is   He has three adult children; 1 daughter and 2 sons  His daughter Abiola Plunkett lives closest [place] and is very involved w/ his care  Patient is not Worship  He lives alone  Social Determinants of Health     Financial Resource Strain: Not on file   Food Insecurity: No Food Insecurity    Worried About Running Out of Food in the Last Year: Never true    Vi of Food in the Last Year: Never true   Transportation Needs: No Transportation Needs    Lack of Transportation (Medical): No    Lack of Transportation (Non-Medical):  No   Physical Activity: Not on file   Stress: Not on file   Social Connections: Not on file   Intimate Partner Violence: Not on file   Housing Stability: Low Risk     Unable to Pay for Housing in the Last Year: No    Number of Places Lived in the Last Year: 1    Unstable Housing in the Last Year: No        Current Outpatient Medications:     amLODIPine (NORVASC) 10 mg tablet, TAKE ONE TABLET BY MOUTH DAILY , Disp: 30 tablet, Rfl: 6    budesonide (PULMICORT) 0 5 mg/2 mL nebulizer solution, Take 2 mL (0 5 mg total) by nebulization in the morning and 2 mL (0 5 mg total) in the evening , Disp: 360 mL, Rfl: 11    busPIRone (BUSPAR) 10 mg tablet, TAKE ONE TABLET BY MOUTH THREE TIMES DAILY , Disp: 90 tablet, Rfl: 3    carbidopa-levodopa (SINEMET)  mg per tablet, Take 1 tablet by mouth in the morning and 1 tablet in the evening and 1 tablet before bedtime  , Disp: 90 tablet, Rfl: 0    cholestyramine (QUESTRAN) 4 g packet, Take 1 packet (4 g total) by mouth in the morning , Disp: 60 each, Rfl: 0    Diclofenac Sodium (VOLTAREN) 1 %, Apply 2 g topically 4 (four) times a day for 7 days Apply to R knee, Disp: 56 g, Rfl: 0    finasteride (PROSCAR) 5 mg tablet, TAKE ONE TABLET BY MOUTH IN THE MORNING , Disp: 90 tablet, Rfl: 3    fluticasone (FLONASE) 50 mcg/act nasal spray, 2 sprays into each nostril daily, Disp: 16 g, Rfl: 5    gabapentin (NEURONTIN) 300 mg capsule, TAKE 1 CAPSULE BY MOUTH 3 TIMES DAILY, Disp: 90 capsule, Rfl: 0    ipratropium-albuterol (DUO-NEB) 0 5-2 5 mg/3 mL nebulizer solution, Take 3 mL by nebulization 4 (four) times a day, Disp: 360 mL, Rfl: 5    metoprolol tartrate (LOPRESSOR) 25 mg tablet, Take 1 tablet (25 mg total) by mouth every 12 (twelve) hours, Disp: 60 tablet, Rfl: 0    midodrine (PROAMATINE) 10 MG tablet, Take 1 tab three times daily  Please hold medication if blood pressure is above 198 systolic , Disp: 90 tablet, Rfl: 4    mirtazapine (REMERON) 15 mg tablet, Take 1 tablet (15 mg total) by mouth daily at bedtime, Disp: 30 tablet, Rfl: 5    OXYGEN-HELIUM IN, Inhale 2L at rest- 3L with activity, Disp: , Rfl:     pantoprazole (PROTONIX) 40 mg tablet, TAKE ONE TABLET BY MOUTH TWICE DAILY, Disp: 180 tablet, Rfl: 0    predniSONE 10 mg tablet, Take 2 tablets (20 mg total) by mouth daily for 1 day, THEN 1 tablet (10 mg total) daily for 3 days, THEN 0 5 tablets (5 mg total) daily for 3 days  , Disp: 6 5 tablet, Rfl: 0    tamsulosin (FLOMAX) 0 4 mg, TAKE ONE CAPSULE BY MOUTH ONE TIME DAILY, Disp: 90 capsule, Rfl: 1    Ventolin  (90 Base) MCG/ACT inhaler, Inhale 2 puffs every 4 (four) hours as needed for wheezing, Disp: 18 g, Rfl: 5    ZEMAIRA infusion, once a week , Disp: , Rfl:   Family History   Problem Relation Age of Onset    Emphysema Mother         never smoked    Emphysema Father     Cancer Brother         colon    Colon cancer Brother     Ulcerative colitis Family     Liver disease Family               Coordination of Care: Wound team aware of the treatment plan  Facility nurse will provide wound treatment and monitor the wound for any changes  Patient / Staff education : Patient / Staff was given education on sign of infection and pressure ulcer prevention  Patient/ Staff verbalized understanding     Follow up :  Return sign off  Voice-recognition software may have been used in the preparation of this document  Occasional wrong word or "sound-alike" substitutions may have occurred due to the inherent limitations of voice recognition software  Interpretation should be guided by context        TOPHER Dumas

## 2022-05-16 ENCOUNTER — OFFICE VISIT (OUTPATIENT)
Dept: NEUROLOGY | Facility: CLINIC | Age: 77
End: 2022-05-16
Payer: MEDICARE

## 2022-05-16 ENCOUNTER — HOME CARE VISIT (OUTPATIENT)
Dept: HOME HEALTH SERVICES | Facility: HOME HEALTHCARE | Age: 77
End: 2022-05-16
Payer: MEDICARE

## 2022-05-16 VITALS
HEART RATE: 80 BPM | WEIGHT: 183 LBS | SYSTOLIC BLOOD PRESSURE: 156 MMHG | BODY MASS INDEX: 21.61 KG/M2 | HEIGHT: 77 IN | TEMPERATURE: 97 F | DIASTOLIC BLOOD PRESSURE: 77 MMHG

## 2022-05-16 DIAGNOSIS — G31.84 MCI (MILD COGNITIVE IMPAIRMENT): ICD-10-CM

## 2022-05-16 DIAGNOSIS — G20 PARKINSON DISEASE (HCC): Primary | ICD-10-CM

## 2022-05-16 PROCEDURE — 99215 OFFICE O/P EST HI 40 MIN: CPT | Performed by: PSYCHIATRY & NEUROLOGY

## 2022-05-16 RX ORDER — BUSPIRONE HYDROCHLORIDE 10 MG/1
TABLET ORAL
Qty: 90 TABLET | Refills: 0 | Status: ON HOLD | OUTPATIENT
Start: 2022-05-16 | End: 2022-07-05

## 2022-05-16 NOTE — PROGRESS NOTES
Return NeuroOutpatient Note        Miryam Sapp  [id number]  65 y o   1945       Parkinson's Disease        History obtained from:  Patient     HPI/Subjective:    Miryam Sapp is a 64 yo M with PMH of PD presents as f/u  Per my previous history, on 9/25/21, patient had presented to [facility] with sxs of lightheadedness, sob, poor sleep  He was stroke alerted though suspicion for stroke was low  His CTA had revealed left ICA 63% stenosis  He was placed on aspirin 81mg  He thinks he's here also because he had gotten confused once  He was asked to take detour and had difficulty with turning around  At our initial encounter, patient was noted to have soft voice  He has stooped posture  He has hard time walking without walker  He gets lightheaded every now and then  He does report difficulty swallowing  He feels as though food gets stuck  He does drool at night  Occasionally, there is right hand tremor though upon action  We had started him on sinemet and asked him to do PT  Since PT, he has been doing better  Patient lives alone and his 's license was taken away due to difficulty with ambulation and cognition early this year  Patient states that he's always had difficulty remembering, memorizing  Patient would like to drive to nearby PT but without being able to drive, he's limited  Over time, we have increased his midodrine to now 10mg tid  He doesn't fall but when he stands up, may still feel lightheaded at times  He requires continuous oxygen supply for COPD  Patient's EEG was slightly abnormal but he has no clinical events to suspect seizure         Past Medical History:   Diagnosis Date    Anesthesia complication     Difficult to wake up    Arthritis     BPH (benign prostatic hyperplasia)     urinary frequency    Cancer (HCC)     basal cell neck, face    COPD [facility]) [facility])     COPD exacerbation ([facility] [address] ) 12/29/2019    Full dentures     Hiatal hernia     History of methicillin resistant staphylococcus aureus (MRSA)     10/11/2018 MRSA (nares) positive    Hypertension     Irritable bowel syndrome     Kidney stone     at least 7 episodes    Liver disease     Alpha 1- enzyme deficiency - diagnosed   has been on weekly replacement therapy since then    Pulmonary emphysema ([address] )     1/25/15  FEV1 - 2 45 liters or 59% of predicted    RSV infection 2017    Wears glasses     for driving only     Social History     Socioeconomic History    Marital status:      Spouse name: Not on file    Number of children: Not on file    Years of education: Not on file    Highest education level: Not on file   Occupational History    Not on file   Tobacco Use    Smoking status: Former Smoker     Packs/day: 1 00     Years: 60 00     Pack years: 60 00     Quit date: 2017     Years since quittin 7    Smokeless tobacco: Never Used    Tobacco comment: quit in 2017   Vaping Use    Vaping Use: Never used   Substance and Sexual Activity    Alcohol use: Not Currently     Comment: stopped in     Drug use: Not Currently    Sexual activity: Not on file   Other Topics Concern    Not on file   Social History Narrative    Patient is   He has three adult children; 1 daughter and 2 sons  His daughter Elizabeth Ortiz lives closest [place] and is very involved w/ his care  Patient is not [employer]  He lives alone  Social Determinants of Health     Financial Resource Strain: Not on file   Food Insecurity: No Food Insecurity    Worried About Running Out of Food in the Last Year: Never true    Vi of Food in the Last Year: Never true   Transportation Needs: No Transportation Needs    Lack of Transportation (Medical): No    Lack of Transportation (Non-Medical):  No   Physical Activity: Not on file   Stress: Not on file   Social Connections: Not on file   Intimate Partner Violence: Not on file   Housing Stability: Low Risk     Unable to Pay for Housing in the Last Year: No    Number of Places Lived in the Last Year: 1    Unstable Housing in the Last Year: No     Family History   Problem Relation Age of Onset    Emphysema Mother         never smoked    Emphysema Father     Cancer Brother         colon    Colon cancer Brother     Ulcerative colitis Family     Liver disease Family      Allergies   Allergen Reactions    Chantix [Varenicline]      Caused prostate infection     Current Outpatient Medications on File Prior to Visit   Medication Sig Dispense Refill    amLODIPine (NORVASC) 10 mg tablet TAKE ONE TABLET BY MOUTH DAILY  30 tablet 6    budesonide (PULMICORT) 0 5 mg/2 mL nebulizer solution Take 2 mL (0 5 mg total) by nebulization in the morning and 2 mL (0 5 mg total) in the evening  360 mL 11    busPIRone (BUSPAR) 10 mg tablet TAKE ONE TABLET BY MOUTH THREE TIMES DAILY  90 tablet 3    carbidopa-levodopa (SINEMET)  mg per tablet Take 1 tablet by mouth in the morning and 1 tablet in the evening and 1 tablet before bedtime  90 tablet 0    cholestyramine (QUESTRAN) 4 g packet Take 1 packet (4 g total) by mouth in the morning  60 each 0    finasteride (PROSCAR) 5 mg tablet TAKE ONE TABLET BY MOUTH IN THE MORNING  90 tablet 3    fluticasone (FLONASE) 50 mcg/act nasal spray 2 sprays into each nostril daily 16 g 5    gabapentin (NEURONTIN) 300 mg capsule TAKE 1 CAPSULE BY MOUTH 3 TIMES DAILY 90 capsule 0    ipratropium-albuterol (DUO-NEB) 0 5-2 5 mg/3 mL nebulizer solution Take 3 mL by nebulization 4 (four) times a day 360 mL 5    metoprolol tartrate (LOPRESSOR) 25 mg tablet Take 1 tablet (25 mg total) by mouth every 12 (twelve) hours 60 tablet 0    midodrine (PROAMATINE) 10 MG tablet Take 1 tab three times daily  Please hold medication if blood pressure is above 771 systolic   90 tablet 4    mirtazapine (REMERON) 15 mg tablet Take 1 tablet (15 mg total) by mouth daily at bedtime 30 tablet 5    OXYGEN-HELIUM IN Inhale 2L at rest- 3L with activity      pantoprazole (PROTONIX) 40 mg tablet TAKE ONE TABLET BY MOUTH TWICE DAILY 180 tablet 0    predniSONE 10 mg tablet Take 2 tablets (20 mg total) by mouth daily for 1 day, THEN 1 tablet (10 mg total) daily for 3 days, THEN 0 5 tablets (5 mg total) daily for 3 days  6 5 tablet 0    tamsulosin (FLOMAX) 0 4 mg TAKE ONE CAPSULE BY MOUTH ONE TIME DAILY 90 capsule 1    Ventolin  (90 Base) MCG/ACT inhaler Inhale 2 puffs every 4 (four) hours as needed for wheezing 18 g 5    ZEMAIRA infusion once a week       Diclofenac Sodium (VOLTAREN) 1 % Apply 2 g topically 4 (four) times a day for 7 days Apply to R knee 56 g 0     No current facility-administered medications on file prior to visit  Review of Systems   Refer to positive review of systems in HPI  Review of Systems    Constitutional- No fever  Eyes- No visual change  ENT- Hearing normal  CV- No chest pain  Resp- No Shortness of breath  GI- No diarrhea  - Bladder normal  MS- No Arthritis   Skin- No rash  Psych- No depression  Endo- No DM  Heme- No nodes    Vitals:    05/16/22 1309   BP: 156/77   BP Location: Left arm   Patient Position: Sitting   Cuff Size: Standard   Pulse: 80   Temp: (!) 97 °F (36 1 °C)   TempSrc: Oral   Weight: 83 kg (183 lb)   Height: 6' 5" (1 956 m)       PHYSICAL EXAM:  Appearance: No Acute Distress, +masked facies  Ophthalmoscopic: Disc Flat, Normal fundus  Mental status:  Orientation: Awake, Alert, and Orientedx3  Memory: MOCA: 23/30  Attention: normal  Knowledge: good  Language: No aphasia  Speech: mild hypophonia dysarthria  Cranial Nerves:  2 No Visual Defect on Confrontation, Pupils round, equal, reactive to light  3,4,6 Extraocular Movements Intact, no nystagmus  5 Facial Sensation Intact  7 No facial asymmetry  8 Intact hearing  9,10 Palate symmetric, normal gag  11 Good shoulder shrug  12 Tongue Midline  Gait: independent  Limiting factor is sob     Coordination: No ataxia with finger to nose testing, and heel to shin  Sensory: Intact, Symmetric to pinprick, light touch, vibration, and joint position  Muscle Tone: trace rigidity in LE  Muscle exam:  Arm Right Left Leg Right Left   Deltoid 5/5 5/5 Iliopsoas 5/5 5/5   Biceps 5/5 5/5 Quads 5/5 5/5   Triceps 5/5 5/5 Hamstrings 5/5 5/5   Wrist Extension 5/5 5/5 Ankle Dorsi Flexion 5/5 5/5   Wrist Flexion 5/5 5/5 Ankle Plantar Flexion 5/5 5/5   Interossei 5/5 5/5 Ankle Eversion 5/5 5/5   APB 5/5 5/5 Ankle Inversion 5/5 5/5       Reflexes   RJ BJ TJ KJ AJ Plantars Chavez's   Right 2+ 2+ 2+ 2+ 2+ Downgoing Not present   Left 2+ 2+ 2+ 2+ 2+ Downgoing Not present     Personal review of  Labs:                    Diagnoses and all orders for this visit:      1  Parkinson disease ([address] )     2  MCI (mild cognitive impairment)         Patient is infact much better compared to initial evaluation  He did fairly well on MOCA  We don't have good reason to keep him from driving  This will allow him to go to nearby PT and stay well physically  With midodrine, his lightheadedness is also better  Asked him to continue PT as tolerated                 Total time of encounter:  40 min  More than 50% of the time was used in counseling and/or coordination of care  Extent of counseling and/or coordination of care        Nacho Contreras MD  [facility]  [address]  [address]  [phone]

## 2022-05-16 NOTE — LETTER
May 16, 2022     Patient: Rocklin Cushing  YOB: 1945  Date of Visit: 5/16/2022      To Whom it May Concern:    Candelaria Robles is under my professional care  Connie Neighbor was seen in my office on 5/16/2022  Patient did fairly well on his memory test during today's visit  He's physically better with medication in terms of PD  We are releasing him to drive so he can better serve himself  If you have any questions or concerns, please don't hesitate to call           Sincerely,          Lynsey Valles MD        CC: No Recipients

## 2022-05-17 ENCOUNTER — PATIENT OUTREACH (OUTPATIENT)
Dept: FAMILY MEDICINE CLINIC | Facility: CLINIC | Age: 77
End: 2022-05-17

## 2022-05-17 ENCOUNTER — HOME CARE VISIT (OUTPATIENT)
Dept: HOME HEALTH SERVICES | Facility: HOME HEALTHCARE | Age: 77
End: 2022-05-17
Payer: MEDICARE

## 2022-05-17 VITALS — SYSTOLIC BLOOD PRESSURE: 190 MMHG | OXYGEN SATURATION: 96 % | HEART RATE: 77 BPM | DIASTOLIC BLOOD PRESSURE: 101 MMHG

## 2022-05-17 PROCEDURE — G0152 HHCP-SERV OF OT,EA 15 MIN: HCPCS

## 2022-05-17 PROCEDURE — G0151 HHCP-SERV OF PT,EA 15 MIN: HCPCS

## 2022-05-17 NOTE — PROGRESS NOTES
Home visit   05/13/2022    [facility] conducted HV at patients home to complete [employer] transportation services application and to sign request from his Neuro office from [address] [place] [address]  Patient provided a copy of his PA ID as well for application  During the visit I noticed it was really warm in the house and he stated he was not feeling to well, I asked him If he was hot and he said yes that it could be that then I asked if he had the air on and that's when patient realized he forgot to turn the central air on  He then said he felt better and received food delivery as I was leaving  Will follow up with patient in regards to his application      Next outreach is scheduled for 05/20/2022

## 2022-05-17 NOTE — PROGRESS NOTES
CARLENE had received an in basket today from [facility] about this patient  Per in basket, Maritza met with patient for [employer] application as well as obtain signature from his Omar Redd for [employer]  Patient's home was hot but it was fixed once central air was turned on  Torstenia to f/u on [employer] application and communicate back on this  CARLENE will continue to f/u

## 2022-05-17 NOTE — CASE COMMUNICATION
Home OT evaluation completed 5 17 22  OT requesting sign off of OT POC 1 x week 1, 2 x week 2, 1 x week 3 to address functional deficits and improve safety and efficiency in the home  Pt reporting he is switching from Dr Claudia Eddy to Dr Kt Valencia for PCP  Pt high fall risk with 2 R lateral LOB requiring min A to correct  Pt vitals 162/93 bp hr 76 sp02 98 prior to activity  After ambulation and shower transfer /101 sp02 96 percen t hr 77 pt noted with instability on feet but no other s/s  A few moments later /96

## 2022-05-18 VITALS
OXYGEN SATURATION: 96 % | DIASTOLIC BLOOD PRESSURE: 82 MMHG | SYSTOLIC BLOOD PRESSURE: 178 MMHG | HEART RATE: 70 BPM | RESPIRATION RATE: 20 BRPM

## 2022-05-18 NOTE — HOME HEALTH
Pt evaluated this date at home  Pt found to be somewaht hypertensive at rest   178/80  on 4L O2 continuous  AFter 1 min walk using ww     Pt complained increased dizziness     /98   Nely Sequin    100 pulse     90PO2  Noted pt on Midodrine  AVS from hospital stay stated pt not to take Midodrine if SPB greater than 140     PT removed Midodrine from his med planner for the week  Pt was not aware he needed to do this      TT to [facility] and spoke with primary PT Ainsley LUZ to inform

## 2022-05-19 ENCOUNTER — HOME CARE VISIT (OUTPATIENT)
Dept: HOME HEALTH SERVICES | Facility: HOME HEALTHCARE | Age: 77
End: 2022-05-19
Payer: MEDICARE

## 2022-05-19 VITALS
OXYGEN SATURATION: 98 % | RESPIRATION RATE: 20 BRPM | DIASTOLIC BLOOD PRESSURE: 70 MMHG | SYSTOLIC BLOOD PRESSURE: 142 MMHG | TEMPERATURE: 97.6 F | HEART RATE: 66 BPM

## 2022-05-19 PROCEDURE — G0299 HHS/HOSPICE OF RN EA 15 MIN: HCPCS

## 2022-05-20 ENCOUNTER — PATIENT OUTREACH (OUTPATIENT)
Dept: FAMILY MEDICINE CLINIC | Facility: CLINIC | Age: 77
End: 2022-05-20

## 2022-05-20 ENCOUNTER — HOME CARE VISIT (OUTPATIENT)
Dept: HOME HEALTH SERVICES | Facility: HOME HEALTHCARE | Age: 77
End: 2022-05-20
Payer: MEDICARE

## 2022-05-20 PROCEDURE — G0180 MD CERTIFICATION HHA PATIENT: HCPCS | Performed by: NURSE PRACTITIONER

## 2022-05-20 PROCEDURE — G0151 HHCP-SERV OF PT,EA 15 MIN: HCPCS

## 2022-05-20 NOTE — PROGRESS NOTES
Outgoing call  05/20/2022     CMOC called patient FRANCHESKA [phone] ALTAGRACIA [phone] MEGAN follow up on [employer] application we had completed, patient stated he will no longer need the [employer] service because his doctor approved his driving privileges back, [facility] congratulated patient and told patient if any assistance in needed in the future to let his family doctor know so they may contact myself or any CMOC available to assist  [facility] will close out referral due to no further assistance needed for patient      No future Outreach is needed

## 2022-05-22 NOTE — PROGRESS NOTES
Assessment/Plan:     Diagnoses and all orders for this visit:    Follow-up exam after treatment  · 71-year-old gentleman presenting today status post discharge from short-term rehab on 05/11/2022, status post treatment at [facility] from 04/28/2022 to 05/04/2022 2/2 COPD exacerbation  · Patient is currently at baseline (3 L nasal cannula with SpO2 at >94%)  · Patient currently denies any acute medical issues, and states he is in his usual state of health  · Patient to return in 2 weeks for his AWV  Primary insomnia  Comments:  - Chronic, unresolved, sleep hygiene discussed  Patient currently on mirtazapine from 15 mg q h s  Will increase from 15-30 mg q h s  Will re-evaluate for effectiveness of medication status post recent increase in 2 weeks when patient presents for his AWV  Orders:  -     mirtazapine (REMERON) 15 mg tablet; Take 2 tablets (30 mg total) by mouth daily at bedtime          Subjective:      Patient ID: Gopi Brown is a 65 y o  male  Pleasant looking 71-year-old male who is presenting today for evaluation and his transition of care management status post discharge from short-term rehab on 05/11/2022  Patient was previously admitted and treated at [facility] from 04/08/2022 to 05/04/2022 2/2 COPD exacerbation  Patient recently saw his neurologist on 05/16/2022, and has received clearance to resume driving  Patient currently follows a pulmonologist (Dr Brandon Mead), and has an upcoming appointment on 05/25/2022  Patient at this time denies any shortness of breath, chest pain, subjective fever, N/V/D  Patient states he is otherwise in his usual state of health        The following portions of the patient's history were reviewed and updated as appropriate:   He  has a past medical history of Anesthesia complication, Arthritis, BPH (benign prostatic hyperplasia), Cancer ([address] ), COPD (chronic obstructive pulmonary disease) ([facility] [address] COPD exacerbation ([facility] [address] ) (12/29/2019), Full dentures, Hiatal hernia, History of methicillin resistant staphylococcus aureus (MRSA), Hypertension, Irritable bowel syndrome, Kidney stone, Liver disease, Pulmonary emphysema ([address] ), RSV infection (12/2017), and Wears glasses  He   Patient Active Problem List    Diagnosis Date Noted    H/O cardiac catheterization 05/11/2022    Chest heaviness 05/01/2022    Hypertensive urgency 04/28/2022    Other secondary hypertension 04/27/2022    Cognitive impairment 12/14/2021    Parkinson's disease ([address] ) 11/17/2021    Anxiousness 04/06/2021    Vitamin D deficiency disease 04/05/2021    Avascular necrosis of hip, right ([address] ) 04/05/2021    Depression, recurrent ([address] ) 04/05/2021    Palliative care patient 11/10/2020    Orthostatic hypotension with spine hypertension 09/23/2020    Thrombocytopenia ([address] ) 01/20/2020    COPD with acute exacerbation ([address] ) 12/29/2019    Other constipation 12/16/2019    Ambulatory dysfunction 12/16/2019    Pulmonary hypertension ([address] ) 07/22/2019    Insomnia 06/28/2019    Chronic venous insufficiency 06/14/2019    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 (HCC) 06/14/2019    Herpes labialis 05/13/2019    Elevated PSA 10/25/2018    Centrilobular emphysema ([address] ) 04/02/2018    Chronic respiratory failure with hypoxia ([address] ) 03/29/2018    Former smoker 01/31/2018    Liver disease     Alpha-1-antitrypsin deficiency ([address] ) 12/30/2017    Gastroesophageal reflux disease 12/30/2017    Essential hypertension 12/30/2017    Acute and chronic respiratory failure with hypoxia ([address] ) 12/30/2017    BPH (benign prostatic hyperplasia) 12/30/2017    Peripheral neuropathy 11/09/2017    Allergic rhinitis 12/20/2016    Cataracts, both eyes 04/09/2015    Chronic diarrhea  11/12/2014    Benign colon polyp 01/27/2014     He  has a past surgical history that includes Colonoscopy; Vein ligation and stripping (Bilateral); Tonsillectomy; Lithotripsy;  Cystoscopy; Esophagogastroduodenoscopy (N/A, 3/10/2017); Colonoscopy (N/A, 3/10/2017); Back surgery (2008); pr esophagogastroduodenoscopy transoral diagnostic (N/A, 11/20/2018); Cardiac catheterization (N/A, 5/3/2022); Cardiac catheterization (Left, 5/3/2022); and Cardiac catheterization (Left, 5/3/2022)  His family history includes Cancer in his brother; Colon cancer in his brother; Emphysema in his father and mother; Liver disease in his family; Ulcerative colitis in his family  He  reports that he quit smoking about 4 years ago  He has a 60 00 pack-year smoking history  He has never used smokeless tobacco  He reports previous alcohol use  He reports previous drug use  Current Outpatient Medications   Medication Sig Dispense Refill    amLODIPine (NORVASC) 10 mg tablet TAKE ONE TABLET BY MOUTH DAILY  30 tablet 6    budesonide (PULMICORT) 0 5 mg/2 mL nebulizer solution Take 2 mL (0 5 mg total) by nebulization in the morning and 2 mL (0 5 mg total) in the evening  360 mL 11    busPIRone (BUSPAR) 10 mg tablet TAKE ONE TABLET BY MOUTH THREE TIMES DAILY 90 tablet 0    carbidopa-levodopa (SINEMET)  mg per tablet Take 1 tablet by mouth in the morning and 1 tablet in the evening and 1 tablet before bedtime  90 tablet 0    cholestyramine (QUESTRAN) 4 g packet Take 1 packet (4 g total) by mouth in the morning  60 each 0    finasteride (PROSCAR) 5 mg tablet TAKE ONE TABLET BY MOUTH IN THE MORNING  90 tablet 3    fluticasone (FLONASE) 50 mcg/act nasal spray 2 sprays into each nostril daily 16 g 5    gabapentin (NEURONTIN) 300 mg capsule TAKE 1 CAPSULE BY MOUTH 3 TIMES DAILY 90 capsule 0    ipratropium-albuterol (DUO-NEB) 0 5-2 5 mg/3 mL nebulizer solution Take 3 mL by nebulization 4 (four) times a day 360 mL 5    metoprolol tartrate (LOPRESSOR) 25 mg tablet Take 1 tablet (25 mg total) by mouth every 12 (twelve) hours 60 tablet 0    midodrine (PROAMATINE) 10 MG tablet Take 1 tab three times daily   Please hold medication if blood pressure is above 891 systolic  90 tablet 4    mirtazapine (REMERON) 15 mg tablet Take 2 tablets (30 mg total) by mouth daily at bedtime 30 tablet 5    OXYGEN-HELIUM IN Inhale 2L at rest- 3L with activity      pantoprazole (PROTONIX) 40 mg tablet TAKE ONE TABLET BY MOUTH TWICE DAILY 180 tablet 0    tamsulosin (FLOMAX) 0 4 mg TAKE ONE CAPSULE BY MOUTH ONE TIME DAILY 90 capsule 1    Ventolin  (90 Base) MCG/ACT inhaler Inhale 2 puffs every 4 (four) hours as needed for wheezing 18 g 5    ZEMAIRA infusion once a week       Diclofenac Sodium (VOLTAREN) 1 % Apply 2 g topically 4 (four) times a day for 7 days Apply to R knee (Patient not taking: Reported on 5/23/2022) 56 g 0     No current facility-administered medications for this visit  Current Outpatient Medications on File Prior to Visit   Medication Sig    amLODIPine (NORVASC) 10 mg tablet TAKE ONE TABLET BY MOUTH DAILY     budesonide (PULMICORT) 0 5 mg/2 mL nebulizer solution Take 2 mL (0 5 mg total) by nebulization in the morning and 2 mL (0 5 mg total) in the evening   busPIRone (BUSPAR) 10 mg tablet TAKE ONE TABLET BY MOUTH THREE TIMES DAILY    carbidopa-levodopa (SINEMET)  mg per tablet Take 1 tablet by mouth in the morning and 1 tablet in the evening and 1 tablet before bedtime   cholestyramine (QUESTRAN) 4 g packet Take 1 packet (4 g total) by mouth in the morning   finasteride (PROSCAR) 5 mg tablet TAKE ONE TABLET BY MOUTH IN THE MORNING     fluticasone (FLONASE) 50 mcg/act nasal spray 2 sprays into each nostril daily    gabapentin (NEURONTIN) 300 mg capsule TAKE 1 CAPSULE BY MOUTH 3 TIMES DAILY    ipratropium-albuterol (DUO-NEB) 0 5-2 5 mg/3 mL nebulizer solution Take 3 mL by nebulization 4 (four) times a day    metoprolol tartrate (LOPRESSOR) 25 mg tablet Take 1 tablet (25 mg total) by mouth every 12 (twelve) hours    midodrine (PROAMATINE) 10 MG tablet Take 1 tab three times daily   Please hold medication if blood pressure is above 221 systolic   OXYGEN-HELIUM IN Inhale 2L at rest- 3L with activity    pantoprazole (PROTONIX) 40 mg tablet TAKE ONE TABLET BY MOUTH TWICE DAILY    tamsulosin (FLOMAX) 0 4 mg TAKE ONE CAPSULE BY MOUTH ONE TIME DAILY    Ventolin  (90 Base) MCG/ACT inhaler Inhale 2 puffs every 4 (four) hours as needed for wheezing    ZEMAIRA infusion once a week     [DISCONTINUED] mirtazapine (REMERON) 15 mg tablet Take 1 tablet (15 mg total) by mouth daily at bedtime    Diclofenac Sodium (VOLTAREN) 1 % Apply 2 g topically 4 (four) times a day for 7 days Apply to R knee (Patient not taking: Reported on 5/23/2022)     No current facility-administered medications on file prior to visit  He is allergic to chantix [varenicline]       Review of Systems   Constitutional: Negative  HENT: Negative  Cardiovascular: Negative  Gastrointestinal: Negative  Genitourinary: Negative  Musculoskeletal: Negative  Neurological: Positive for light-headedness  Objective:      BP (!) 178/70 (BP Location: Left arm, Patient Position: Sitting, Cuff Size: Adult)   Pulse 81   Temp 98 4 °F (36 9 °C) (Tympanic)   Resp 18   Ht 6' 5" (1 956 m)   Wt 82 1 kg (181 lb)   SpO2 95% Comment: on 02 @ 3lpm via n/c  BMI 21 46 kg/m²          Physical Exam  Vitals reviewed  Constitutional:       General: He is not in acute distress  Appearance: Normal appearance  He is normal weight  He is ill-appearing  He is not toxic-appearing or diaphoretic  HENT:      Head: Normocephalic and atraumatic  Right Ear: External ear normal       Left Ear: External ear normal       Nose: Nose normal       Comments: Nasal cannula in-place  Cardiovascular:      Rate and Rhythm: Normal rate and regular rhythm  Pulses: Normal pulses  Heart sounds: Normal heart sounds  No murmur heard  No gallop  Pulmonary:      Effort: Pulmonary effort is normal       Breath sounds: Normal breath sounds   No wheezing, rhonchi or rales  Comments: Darletta Shorten air entry likely 2/2 poor effort  Skin:     General: Skin is warm  Findings: Bruising present  Comments: Upper limbs notable for ecchymoses   Neurological:      Mental Status: He is alert and oriented to person, place, and time

## 2022-05-23 ENCOUNTER — TELEPHONE (OUTPATIENT)
Dept: ADMINISTRATIVE | Facility: OTHER | Age: 77
End: 2022-05-23

## 2022-05-23 ENCOUNTER — OFFICE VISIT (OUTPATIENT)
Dept: FAMILY MEDICINE CLINIC | Facility: CLINIC | Age: 77
End: 2022-05-23
Payer: MEDICARE

## 2022-05-23 VITALS
SYSTOLIC BLOOD PRESSURE: 178 MMHG | TEMPERATURE: 98.4 F | HEIGHT: 77 IN | BODY MASS INDEX: 21.37 KG/M2 | RESPIRATION RATE: 18 BRPM | OXYGEN SATURATION: 95 % | HEART RATE: 81 BPM | WEIGHT: 181 LBS | DIASTOLIC BLOOD PRESSURE: 70 MMHG

## 2022-05-23 VITALS — OXYGEN SATURATION: 97 % | DIASTOLIC BLOOD PRESSURE: 68 MMHG | HEART RATE: 80 BPM | SYSTOLIC BLOOD PRESSURE: 120 MMHG

## 2022-05-23 DIAGNOSIS — Z09 FOLLOW-UP EXAM AFTER TREATMENT: Primary | ICD-10-CM

## 2022-05-23 DIAGNOSIS — F51.01 PRIMARY INSOMNIA: ICD-10-CM

## 2022-05-23 PROCEDURE — 99213 OFFICE O/P EST LOW 20 MIN: CPT | Performed by: FAMILY MEDICINE

## 2022-05-23 RX ORDER — MIRTAZAPINE 15 MG/1
30 TABLET, FILM COATED ORAL
Qty: 30 TABLET | Refills: 5 | Status: SHIPPED | OUTPATIENT
Start: 2022-05-23

## 2022-05-23 NOTE — TELEPHONE ENCOUNTER
----- Message from Jeremiah Palmer LPN sent at 7/95/3660  8:45 AM EDT -----  Regarding: covid 2nd booster  05/23/22 8:45 AM    Hello, our patient Severiano Lazier has had Immunization(s) completed/performed  Please assist in updating the patient chart by making an External outreach to [facility]  The date of service is April 2022      Thank you,  Jeremiah Palmer LPN   LAYLA LEONE

## 2022-05-23 NOTE — TELEPHONE ENCOUNTER
Upon review of the In Basket request and the patient's chart, initial outreach has been made via fax, please see Contacts section for details       Thank you  Aide Zaragoza

## 2022-05-23 NOTE — LETTER
Vaccination Request Form: [id number] aka SARS-CoV-2 (Ashkum Dasen or Leonel Contreras or [employer])      Date Requested: 22  Patient: Burton [place]  Patient : 1945   Referring Provider: Seferino Gutierrez MD       The above patient has informed us that they have had their   most recent COVID-19 aka SARS-CoV-2 (Jerica Dasen or Leonel Contreras or [employer]) administered at your facility  Please   complete this form and attach all corresponding documentation  Date of Vaccine(s) Given  ______________________________    Lot Number(s) _______________________________________    Manufacture(s) ______________________________________    Dose Amount (s) _____________________________________    Expiration Date(s) ____________________________________    Comments __________________________________________________________  ____________________________________________________________________  ____________________________________________________________________  ____________________________________________________________________    Administering Facility  ________________________________________________    Vaccine Administered By (print name) ___________________________________      Form Completed By (print name) _______________________________________      Signature ___________________________________________________________      These reports are needed for  compliance  Please fax this completed form and a copy of the Vaccine Document(s) to our office located at [place] [address] as soon as possible to [phone] britni [place]: Phone [phone]    We thank you for your assistance in treating our mutual patient

## 2022-05-23 NOTE — PATIENT INSTRUCTIONS
Insomnia   AMBULATORY CARE:   Insomnia  is a condition that makes it hard to fall or stay asleep  Lack of sleep can lead to attention or memory problems during the day  You may also be damian, depressed, clumsy, or have headaches  Contact your healthcare provider if:   Your symptoms do not get better, or they get worse  You begin to use drugs or alcohol to fall asleep  You have questions or concerns about your condition or care  Medicines:   Medicines  may help you sleep more regularly or help you feel less anxious  Take your medicine as directed  Contact your healthcare provider if you think your medicine is not helping or if you have side effects  Tell him or her if you are allergic to any medicine  Keep a list of the medicines, vitamins, and herbs you take  Include the amounts, and when and why you take them  Bring the list or the pill bottles to follow-up visits  Carry your medicine list with you in case of an emergency  What you can do to improve your sleep:   Create a sleep schedule  Try to go to sleep and wake up at the same times every day  Keep a record of your sleep patterns, and any sleeping problems you have  Bring the record to follow-up visits with healthcare providers  Do not take naps  Naps could make it hard for you to fall asleep at bedtime  Keep your bedroom cool, quiet, and dark  Turn on white noise, such as a fan, to help you relax  Do not use your bed for any activity that will keep you awake  Do not read, exercise, eat, or watch TV in your bedroom  Get up if you do not fall asleep within 20 minutes  Move to another room and do something relaxing until you become sleepy  Limit caffeine, alcohol, and food to earlier in the day  Only drink caffeine in the morning  Do not drink alcohol within 6 hours of bedtime  Do not eat a heavy meal right before you go to bed  Exercise regularly  Daily exercise may help you sleep better   Do not exercise within 4 hours of bedtime  Follow up with your healthcare provider as directed: Your healthcare provider may refer you for cognitive behavioral therapy  A behavioral therapist may help you find ways to relax, decrease stress, and improve sleep  Write down your questions so you remember to ask them during your visits  © Copyright [employer] 2022 Information is for End User's use only and may not be sold, redistributed or otherwise used for commercial purposes  All illustrations and images included in CareNotes® are the copyrighted property of [employer]  or [facility] Gwen Hernandez   The above information is an  only  It is not intended as medical advice for individual conditions or treatments  Talk to your doctor, nurse or pharmacist before following any medical regimen to see if it is safe and effective for you

## 2022-05-24 ENCOUNTER — HOME CARE VISIT (OUTPATIENT)
Dept: HOME HEALTH SERVICES | Facility: HOME HEALTHCARE | Age: 77
End: 2022-05-24
Payer: MEDICARE

## 2022-05-24 ENCOUNTER — PATIENT OUTREACH (OUTPATIENT)
Dept: FAMILY MEDICINE CLINIC | Facility: CLINIC | Age: 77
End: 2022-05-24

## 2022-05-24 VITALS — HEART RATE: 64 BPM | SYSTOLIC BLOOD PRESSURE: 145 MMHG | DIASTOLIC BLOOD PRESSURE: 80 MMHG | OXYGEN SATURATION: 98 %

## 2022-05-24 VITALS — SYSTOLIC BLOOD PRESSURE: 120 MMHG | DIASTOLIC BLOOD PRESSURE: 80 MMHG

## 2022-05-24 PROCEDURE — G0151 HHCP-SERV OF PT,EA 15 MIN: HCPCS

## 2022-05-24 PROCEDURE — G0152 HHCP-SERV OF OT,EA 15 MIN: HCPCS

## 2022-05-24 NOTE — PROGRESS NOTES
GAY had completed a chart review  Per chart, DRE Simeon closed case as patient is driving once again and does not want [facility]  [facility] had called [facility] services  [facility] had spoke with Michael Saarvia who did not see anything on file for this patient  [facility] was then transferred to registration and spoke with João Ramirez who mentioned the same  [facility] asked João Lab if patient did not want services then want happens  João Lab stated the patient would be in the system forever and would need to renew if he was still interested in services  GAY had called the patient via phone  [facility] asked the patient how he was doing  Patient stated he was doing well  Patient denied needing [facility] at this time  Patient stated his neurology provider wrote note back on 5/16 that patient can drive after memory test was completed  Patient denied any other needs  Patient stated main concern was that he wasn't driving but now can drive  [facility] informed the patient if there was anything else he needed assistance to please call back  Patient stated he would do so  [facility] is closing this case  Please reconsult for future SW needs

## 2022-05-25 ENCOUNTER — OFFICE VISIT (OUTPATIENT)
Dept: PULMONOLOGY | Facility: MEDICAL CENTER | Age: 77
End: 2022-05-25
Payer: MEDICARE

## 2022-05-25 VITALS
OXYGEN SATURATION: 95 % | HEIGHT: 77 IN | SYSTOLIC BLOOD PRESSURE: 142 MMHG | TEMPERATURE: 98 F | DIASTOLIC BLOOD PRESSURE: 82 MMHG | WEIGHT: 180.6 LBS | BODY MASS INDEX: 21.32 KG/M2 | RESPIRATION RATE: 12 BRPM | HEART RATE: 84 BPM

## 2022-05-25 DIAGNOSIS — E88.01 ALPHA-1-ANTITRYPSIN DEFICIENCY (HCC): Chronic | ICD-10-CM

## 2022-05-25 DIAGNOSIS — J43.2 CENTRILOBULAR EMPHYSEMA (HCC): Primary | Chronic | ICD-10-CM

## 2022-05-25 DIAGNOSIS — J96.11 CHRONIC RESPIRATORY FAILURE WITH HYPOXIA (HCC): Chronic | ICD-10-CM

## 2022-05-25 DIAGNOSIS — J44.1 COPD EXACERBATION (HCC): ICD-10-CM

## 2022-05-25 PROBLEM — I27.20 PULMONARY HYPERTENSION (HCC): Status: RESOLVED | Noted: 2019-07-22 | Resolved: 2022-05-25

## 2022-05-25 PROCEDURE — 99214 OFFICE O/P EST MOD 30 MIN: CPT | Performed by: NURSE PRACTITIONER

## 2022-05-25 NOTE — PROGRESS NOTES
Assessment/Plan:     Problem List Items Addressed This Visit        Digestive    Alpha-1-antitrypsin deficiency ([facility] ) (Chronic)     Patient received his IV infusion today for alpha-1 antitrypsin deficiency  While he was in rehabilitation he was unable to receive the same  He is getting Zemaira 1 time weekly              Respiratory    Chronic respiratory failure with hypoxia (HCC) (Chronic)     Patient is currently on 3 L of supplemental oxygen and O2 saturation is 98%  With ambulating on 3 L O2 saturation was 95%  Room air oxygen and ambulation reveals O2 saturation of 86%  Oxygen saturation on room air at rest is 89%  Patient is on continuous oxygen and continues to use an benefit  Jeannetta Given He is also using 3 L of supplemental oxygen at night  Centrilobular emphysema ([facility] ) - Primary (Chronic)     Patient is status post hospitalization  He is much less tachypneic and breathing easier  I heard no wheezing today with auscultation of lungs  He does have history of alpha 1 antitrypsin deficiency and now is at home  He did go to rehab for 1 week only and left because of fear of COVID-19  His last pulmonary function test revealed FEV1 of 40% of predicted  Patient will continue his nebulizer treatment as well as Perforomist and Pulmicort  He is using nebulizer otherwise twice daily  He is not on any oral steroid  He does have extra prednisone at home and if patient begins to feel worse or has difficulty with breathing he can call the office and we can instruct on on how to use prednisone taper  Other Visit Diagnoses     COPD exacerbation ([facility] )                Return in about 4 weeks (around 6/22/2022)  All questions are answered to the patient's satisfaction and understanding  He verbalizes understanding  He is encouraged to call with any further questions or concerns  Portions of the record may have been created with voice recognition software    Occasional wrong word or "sound a like" substitutions may have occurred due to the inherent limitations of voice recognition software  Read the chart carefully and recognize, using context, where substitutions have occurred  Electronically Signed by Adriana Cousins, DIANAVANNESA    ______________________________________________________________________    Chief Complaint: No chief complaint on file  Patient ID: Gloria Mazariegos is a 65 y o  y o  male has a past medical history of Anesthesia complication, Arthritis, BPH (benign prostatic hyperplasia), Cancer ([facility] [address] ), COPD (chronic obstructive pulmonary disease) ([facility] [address] ), COPD exacerbation ([facility] ) (12/29/2019), Full dentures, Hiatal hernia, History of methicillin resistant staphylococcus aureus (MRSA), Hypertension, Irritable bowel syndrome, Kidney stone, Liver disease, Pulmonary emphysema ([facility] [address] ), RSV infection (12/2017), and Wears glasses  5/25/2022  Patient presents today for follow-up visit  WILL Kendrick is here today for post hospitalization visit  He was hospitalized from April 28th to May 4, 2022  He presented with shortness of breath and while treated for COPD exacerbation  He has history of alpha 1 antitrypsin deficiency and  Patient did have consultation with pulmonology  Patient did have a CTA chest PE study  It was negative for pulmonary embolism  There was emphysema with parenchymal scarring without evidence of acute infiltrate and trace bilateral effusions  Patient does have history of chronic hypoxic range respiratory failure for which he uses 3 L of nasal cannula an FEV1 in January 2020 was 40% of predicted  Patient did have a right heart catheterization during this hospitalization  Ejection fraction was normal there was no evidence of pulmonary hypertension and mild nonobstructive coronary artery disease  Review of Systems   Constitutional: Negative  HENT: Negative  Respiratory: Positive for cough and shortness of breath           Dry nonproductive cough  Early morning wheezing, subsidence as day progresses   Cardiovascular: Negative  Gastrointestinal: Negative  Endocrine: Negative  Genitourinary: Negative  Musculoskeletal: Negative  Skin: Negative  Allergic/Immunologic: Negative  Neurological: Negative  Hematological: Negative  Psychiatric/Behavioral: Negative  Smoking history: He reports that he quit smoking about 4 years ago  He has a 60 00 pack-year smoking history  He has never used smokeless tobacco     The following portions of the patient's history were reviewed and updated as appropriate: current medications, past family history, past medical history, past social history, past surgical history and problem list     Immunization History   Administered Date(s) Administered    COVID-19 MODERNA VACC 0 5 ML IM 01/21/2021, 03/01/2021, 12/08/2021    INFLUENZA 09/27/2016, 09/27/2018, 09/28/2019, 09/25/2020, 09/29/2021    Influenza, seasonal, injectable 09/27/2013, 10/23/2014    Pneumococcal Conjugate 13-Valent 04/26/2016    Pneumococcal Polysaccharide PPV23 04/23/2011    Tdap 04/26/2016    Zoster 10/29/2014, 04/27/2015, 07/24/2018    Zoster Vaccine Recombinant 05/24/2018, 07/24/2018     Current Outpatient Medications   Medication Sig Dispense Refill    amLODIPine (NORVASC) 10 mg tablet TAKE ONE TABLET BY MOUTH DAILY  30 tablet 6    budesonide (PULMICORT) 0 5 mg/2 mL nebulizer solution Take 2 mL (0 5 mg total) by nebulization in the morning and 2 mL (0 5 mg total) in the evening  360 mL 11    busPIRone (BUSPAR) 10 mg tablet TAKE ONE TABLET BY MOUTH THREE TIMES DAILY 90 tablet 0    carbidopa-levodopa (SINEMET)  mg per tablet Take 1 tablet by mouth in the morning and 1 tablet in the evening and 1 tablet before bedtime  90 tablet 0    cholestyramine (QUESTRAN) 4 g packet Take 1 packet (4 g total) by mouth in the morning   60 each 0    finasteride (PROSCAR) 5 mg tablet TAKE ONE TABLET BY MOUTH IN THE MORNING  90 tablet 3    fluticasone (FLONASE) 50 mcg/act nasal spray 2 sprays into each nostril daily 16 g 5    gabapentin (NEURONTIN) 300 mg capsule TAKE 1 CAPSULE BY MOUTH 3 TIMES DAILY 90 capsule 0    ipratropium-albuterol (DUO-NEB) 0 5-2 5 mg/3 mL nebulizer solution Take 3 mL by nebulization 4 (four) times a day 360 mL 5    metoprolol tartrate (LOPRESSOR) 25 mg tablet Take 1 tablet (25 mg total) by mouth every 12 (twelve) hours 60 tablet 0    midodrine (PROAMATINE) 10 MG tablet Take 1 tab three times daily  Please hold medication if blood pressure is above 778 systolic  90 tablet 4    mirtazapine (REMERON) 15 mg tablet Take 2 tablets (30 mg total) by mouth daily at bedtime 30 tablet 5    OXYGEN-HELIUM IN Inhale 2L at rest- 3L with activity      pantoprazole (PROTONIX) 40 mg tablet TAKE ONE TABLET BY MOUTH TWICE DAILY 180 tablet 0    tamsulosin (FLOMAX) 0 4 mg TAKE ONE CAPSULE BY MOUTH ONE TIME DAILY 90 capsule 1    Ventolin  (90 Base) MCG/ACT inhaler Inhale 2 puffs every 4 (four) hours as needed for wheezing 18 g 5    ZEMAIRA infusion once a week       Diclofenac Sodium (VOLTAREN) 1 % Apply 2 g topically 4 (four) times a day for 7 days Apply to R knee (Patient not taking: Reported on 5/23/2022) 56 g 0     No current facility-administered medications for this visit  Allergies: Chantix [varenicline]    Objective:  Vitals:    05/25/22 1301   BP: 142/82   Pulse: 84   Resp: 12   Temp: 98 °F (36 7 °C)   TempSrc: Temporal   SpO2: 95%   Weight: 81 9 kg (180 lb 9 6 oz)   Height: 6' 5" (1 956 m)   Oxygen Therapy  SpO2: 95 % (on 3l cont)    Wt Readings from Last 3 Encounters:   05/25/22 81 9 kg (180 lb 9 6 oz)   05/23/22 82 1 kg (181 lb)   05/16/22 83 kg (183 lb)     Body mass index is 21 42 kg/m²  Physical Exam  Constitutional:       Appearance: He is normal weight  HENT:      Head: Normocephalic and atraumatic  Nose: Nose normal       Mouth/Throat:      Mouth: Mucous membranes are dry     Eyes:      Pupils: Pupils are equal, round, and reactive to light  Cardiovascular:      Rate and Rhythm: Normal rate and regular rhythm  Pulses: Normal pulses  Pulmonary:      Effort: Pulmonary effort is normal       Breath sounds: Normal breath sounds  Musculoskeletal:         General: Normal range of motion  Cervical back: Normal range of motion  Skin:     General: Skin is warm  Capillary Refill: Capillary refill takes less than 2 seconds  Neurological:      General: No focal deficit present  Mental Status: He is alert and oriented to person, place, and time  Psychiatric:         Mood and Affect: Mood normal          Behavior: Behavior normal          Lab Review:   No results displayed because visit has over 200 results        Admission on 04/21/2022, Discharged on 04/21/2022   Component Date Value    WBC 04/21/2022 8 69     RBC 04/21/2022 4 60     Hemoglobin 04/21/2022 14 3     Hematocrit 04/21/2022 42 6     MCV 04/21/2022 93     MCH 04/21/2022 31 1     MCHC 04/21/2022 33 6     RDW 04/21/2022 13 6     MPV 04/21/2022 9 1     Platelets 49/16/4941 142 (A)    nRBC 04/21/2022 0     Neutrophils Relative 04/21/2022 53     Immat GRANS % 04/21/2022 0     Lymphocytes Relative 04/21/2022 37     Monocytes Relative 04/21/2022 6     Eosinophils Relative 04/21/2022 3     Basophils Relative 04/21/2022 1     Neutrophils Absolute 04/21/2022 4 57     Immature Grans Absolute 04/21/2022 0 03     Lymphocytes Absolute 04/21/2022 3 24     Monocytes Absolute 04/21/2022 0 51     Eosinophils Absolute 04/21/2022 0 27     Basophils Absolute 04/21/2022 0 07     Sodium 04/21/2022 144     Potassium 04/21/2022 4 2     Chloride 04/21/2022 107     CO2 04/21/2022 24     ANION GAP 04/21/2022 13     BUN 04/21/2022 11     Creatinine 04/21/2022 1 20     Glucose 04/21/2022 84     Calcium 04/21/2022 9 0     AST 04/21/2022 21     ALT 04/21/2022 33     Alkaline Phosphatase 04/21/2022 90     Total Protein 04/21/2022 7 0     Albumin 04/21/2022 3 9  Total Bilirubin 04/21/2022 0 98     eGFR 04/21/2022 57     Protime 04/21/2022 13 2     INR 04/21/2022 1 02     PTT 04/21/2022 33     hs TnI 0hr 04/21/2022 6     NT-proBNP 04/21/2022 346     hs TnI 2hr 04/21/2022 7     Delta 2hr hsTnI 04/21/2022 1     Ventricular Rate 04/21/2022 87     Atrial Rate 04/21/2022 87     AR Interval 04/21/2022 162     QRSD Interval 04/21/2022 98     QT Interval 04/21/2022 374     QTC Interval 04/21/2022 450     P Axis 04/21/2022 95     QRS Axis 04/21/2022 13     T Wave Axis 04/21/2022 34        Past Surgical History:   Procedure Laterality Date    BACK SURGERY  2008    discectomy    CARDIAC CATHETERIZATION N/A 5/3/2022    Procedure: Cardiac catheterization both left and right heart catheterization with vaso dilatory trial;  Surgeon: Akila Alonso MD;  Location: [place] [address] CATH LAB; Service: Cardiology    CARDIAC CATHETERIZATION Left 5/3/2022    Procedure: Cardiac Left Heart Cath;  Surgeon: Akila Alonso MD;  Location: [place] [address] CATH LAB; Service: Cardiology    CARDIAC CATHETERIZATION Left 5/3/2022    Procedure: Cardiac Left Ventriculogram;  Surgeon: Akila Alonso MD;  Location: [place] [address] CATH LAB; Service: Cardiology    COLONOSCOPY      COLONOSCOPY N/A 3/10/2017    Procedure: Tomas Duck;  Surgeon: Blake Martínez MD;  Location: [place] [address] GI LAB; Service:    Pooja Lame      removal of kidney stones    ESOPHAGOGASTRODUODENOSCOPY N/A 3/10/2017    Procedure: ESOPHAGOGASTRODUODENOSCOPY (EGD); Surgeon: Blake Martínez MD;  Location: [facility] GI LAB; Service:     LITHOTRIPSY      AR ESOPHAGOGASTRODUODENOSCOPY TRANSORAL DIAGNOSTIC N/A 11/20/2018    Procedure: ESOPHAGOGASTRODUODENOSCOPY (EGD); Surgeon: Blake Martínez MD;  Location: [facility] GI LAB;   Service: Gastroenterology    TONSILLECTOMY      VEIN LIGATION AND STRIPPING Bilateral     17's        Family History   Problem Relation Age of Onset    Emphysema Mother         never smoked    Emphysema Father  Cancer Brother         colon    Colon cancer Brother     Ulcerative colitis Family     Liver disease Family         Diagnostics:  I have personally reviewed pertinent films in PACS  Office Spirometry Results:     ESS:    XR chest 1 view portable    Result Date: 4/28/2022  Narrative: CHEST INDICATION:   short of breath  COMPARISON:  Chest radiograph dated 4/21/2022  EXAM PERFORMED/VIEWS:  XR CHEST PORTABLE FINDINGS: Cardiomediastinal silhouette appears unremarkable  No focal airspace consolidation  No pneumothorax or pleural effusion  Osseous structures appear within normal limits for patient age  Impression: No acute cardiopulmonary disease  Workstation performed: [id number]     Cardiac catheterization    Result Date: 5/12/2022  Narrative: · The left ventricular systolic function is normal  · The ejection fraction is 50-55% by visual estimate  · No evidence of pulmonary htn · Mild non-obstructive cad · LVEDP 9mmHg  -- TR Band -- Negative study for evidence of significant pulmonary htn     CTA ED chest PE Study    Result Date: 4/28/2022  Narrative: CTA - CHEST WITH IV CONTRAST - PULMONARY ANGIOGRAM INDICATION:   chest pain shortness of breath  COMPARISON: 11/17/2021 TECHNIQUE: CTA examination of the chest was performed using angiographic technique according to a protocol specifically tailored to evaluate for pulmonary embolism  Axial, sagittal, and coronal 2D reformatted images were created from the source data and  submitted for interpretation  In addition, coronal 3D MIP postprocessing was performed on the acquisition scanner  Radiation dose length product (DLP) for this visit:  534 84 mGy-cm   This examination, like all CT scans performed in the [facility], was performed utilizing techniques to minimize radiation dose exposure, including the use of iterative  reconstruction and automated exposure control   IV Contrast:  85 mL of iohexol (OMNIPAQUE)  FINDINGS: PULMONARY ARTERIAL TREE: There is no evidence of acute pulmonary embolism  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 extending into the lower lobe noted, unchanged since 11/17/2021  LUNGS:  Pulmonary emphysema  Areas of scarring in the lungs particularly the right lower lobe  No evidence of acute infiltrate PLEURA:  Trace bilateral effusions  No pneumothorax HEART/GREAT VESSELS:  No pericardial effusion  Coronary artery calcification  No thoracic aortic aneurysm  MEDIASTINUM AND MIKE:  Unremarkable  CHEST WALL AND LOWER NECK:   Unremarkable  VISUALIZED STRUCTURES IN THE UPPER ABDOMEN:  Unremarkable  OSSEOUS STRUCTURES:  No acute fracture or destructive osseous lesion  Impression: 1  No evidence of acute pulmonary embolism  2  Pulmonary emphysema and parenchymal scarring without evidence of acute infiltrate 3  Trace bilateral effusions Workstation performed: [id number]     Echo complete w/ contrast if indicated    Result Date: 5/5/2022  Narrative: Kyle Fransisco  Left Ventricle: Left ventricular cavity size is normal  Wall thickness is normal  Systolic function is low normal 50-55% Wall motion cannot be accurately assessed  Diastolic function is mildly abnormal, consistent with grade I (abnormal) relaxation    Right Ventricle: Wall thickness is increased    Right Atrium: The atrium is moderately dilated    Study Details: This transthoracic echocardiogram was performed at bedside  This was a routine, inpatient study  Study quality was adequate  This was a technically difficult study due to decreased penetration, lung interference and poor acoustic windows  A complete 2D, color flow Doppler, spectral Doppler, 2D, color flow Doppler and spectral Doppler transthoracic echocardiogram was performed  The apical, parasternal, subcostal and suprasternal views were obtained  Patient exhibited sinus rhythm  Technical difficulties due to patient's body habitus

## 2022-05-25 NOTE — ASSESSMENT & PLAN NOTE
Patient is status post hospitalization  He is much less tachypneic and breathing easier  I heard no wheezing today with auscultation of lungs  He does have history of alpha 1 antitrypsin deficiency and now is at home  He did go to rehab for 1 week only and left because of fear of COVID-19  His last pulmonary function test revealed FEV1 of 40% of predicted  Patient will continue his nebulizer treatment as well as Perforomist and Pulmicort  He is using nebulizer otherwise twice daily  He is not on any oral steroid  He does have extra prednisone at home and if patient begins to feel worse or has difficulty with breathing he can call the office and we can instruct on on how to use prednisone taper

## 2022-05-25 NOTE — ASSESSMENT & PLAN NOTE
Patient received his IV infusion today for alpha-1 antitrypsin deficiency  While he was in rehabilitation he was unable to receive the same    He is getting Zemaira 1 time weekly

## 2022-05-25 NOTE — ASSESSMENT & PLAN NOTE
Patient is currently on 3 L of supplemental oxygen and O2 saturation is 98%  With ambulating on 3 L O2 saturation was 95%  Room air oxygen and ambulation reveals O2 saturation of 86%  Oxygen saturation on room air at rest is 89%  Patient is on continuous oxygen and continues to use an benefit  Unk Rebecca He is also using 3 L of supplemental oxygen at night

## 2022-05-26 ENCOUNTER — HOME CARE VISIT (OUTPATIENT)
Dept: HOME HEALTH SERVICES | Facility: HOME HEALTHCARE | Age: 77
End: 2022-05-26
Payer: MEDICARE

## 2022-05-26 VITALS
TEMPERATURE: 97.3 F | SYSTOLIC BLOOD PRESSURE: 136 MMHG | DIASTOLIC BLOOD PRESSURE: 78 MMHG | OXYGEN SATURATION: 98 % | RESPIRATION RATE: 18 BRPM | HEART RATE: 76 BPM

## 2022-05-26 VITALS — DIASTOLIC BLOOD PRESSURE: 90 MMHG | SYSTOLIC BLOOD PRESSURE: 170 MMHG

## 2022-05-26 PROCEDURE — G0151 HHCP-SERV OF PT,EA 15 MIN: HCPCS

## 2022-05-26 PROCEDURE — G0299 HHS/HOSPICE OF RN EA 15 MIN: HCPCS

## 2022-05-26 NOTE — TELEPHONE ENCOUNTER
Chart review 5/20 CHW followed up on [address] application  Patient will not continue the [id number] application as he is able to drive again  MSW remains available for any questions or future social needs

## 2022-05-27 ENCOUNTER — HOME CARE VISIT (OUTPATIENT)
Dept: HOME HEALTH SERVICES | Facility: HOME HEALTHCARE | Age: 77
End: 2022-05-27
Payer: MEDICARE

## 2022-05-27 VITALS — OXYGEN SATURATION: 98 % | SYSTOLIC BLOOD PRESSURE: 150 MMHG | HEART RATE: 60 BPM | DIASTOLIC BLOOD PRESSURE: 80 MMHG

## 2022-05-27 PROCEDURE — G0152 HHCP-SERV OF OT,EA 15 MIN: HCPCS

## 2022-05-29 DIAGNOSIS — K21.9 GASTROESOPHAGEAL REFLUX DISEASE WITHOUT ESOPHAGITIS: ICD-10-CM

## 2022-05-31 ENCOUNTER — HOME CARE VISIT (OUTPATIENT)
Dept: HOME HEALTH SERVICES | Facility: HOME HEALTHCARE | Age: 77
End: 2022-05-31
Payer: MEDICARE

## 2022-05-31 PROCEDURE — G0151 HHCP-SERV OF PT,EA 15 MIN: HCPCS

## 2022-05-31 RX ORDER — PANTOPRAZOLE SODIUM 40 MG/1
TABLET, DELAYED RELEASE ORAL
Qty: 180 TABLET | Refills: 0 | Status: SHIPPED | OUTPATIENT
Start: 2022-05-31 | End: 2022-06-01

## 2022-05-31 NOTE — TELEPHONE ENCOUNTER
As a follow-up, a second attempt has been made for outreach via fax, please see Contacts section for details      Thank you  Kirt Noguera

## 2022-06-01 ENCOUNTER — HOME CARE VISIT (OUTPATIENT)
Dept: HOME HEALTH SERVICES | Facility: HOME HEALTHCARE | Age: 77
End: 2022-06-01
Payer: MEDICARE

## 2022-06-01 VITALS — DIASTOLIC BLOOD PRESSURE: 85 MMHG | SYSTOLIC BLOOD PRESSURE: 150 MMHG | OXYGEN SATURATION: 95 % | HEART RATE: 62 BPM

## 2022-06-01 VITALS — SYSTOLIC BLOOD PRESSURE: 140 MMHG | DIASTOLIC BLOOD PRESSURE: 90 MMHG | HEART RATE: 71 BPM | OXYGEN SATURATION: 97 %

## 2022-06-01 DIAGNOSIS — K21.9 GASTROESOPHAGEAL REFLUX DISEASE WITHOUT ESOPHAGITIS: ICD-10-CM

## 2022-06-01 PROCEDURE — G0152 HHCP-SERV OF OT,EA 15 MIN: HCPCS

## 2022-06-01 RX ORDER — PANTOPRAZOLE SODIUM 40 MG/1
TABLET, DELAYED RELEASE ORAL
Qty: 180 TABLET | Refills: 0 | Status: SHIPPED | OUTPATIENT
Start: 2022-06-01

## 2022-06-01 NOTE — TELEPHONE ENCOUNTER
Upon review of the In Basket request we were able to locate, review, and update the patient chart as requested for Immunization(s) Covid Booster  Any additional questions or concerns should be emailed to the Practice Liaisons via [email or web address] email, please do not reply via In Basket      Thank you  Lexa Garcia

## 2022-06-02 ENCOUNTER — HOME CARE VISIT (OUTPATIENT)
Dept: HOME HEALTH SERVICES | Facility: HOME HEALTHCARE | Age: 77
End: 2022-06-02
Payer: MEDICARE

## 2022-06-02 VITALS
OXYGEN SATURATION: 95 % | RESPIRATION RATE: 20 BRPM | DIASTOLIC BLOOD PRESSURE: 68 MMHG | TEMPERATURE: 97.8 F | HEART RATE: 76 BPM | SYSTOLIC BLOOD PRESSURE: 124 MMHG

## 2022-06-02 PROCEDURE — G0299 HHS/HOSPICE OF RN EA 15 MIN: HCPCS

## 2022-06-02 PROCEDURE — G0151 HHCP-SERV OF PT,EA 15 MIN: HCPCS

## 2022-06-02 NOTE — CASE COMMUNICATION
Patient is concerned about his increasing lightheadedness  Patient stated that the lightheadedness has been getting worse in the past week

## 2022-06-03 VITALS — DIASTOLIC BLOOD PRESSURE: 80 MMHG | SYSTOLIC BLOOD PRESSURE: 130 MMHG

## 2022-06-05 ENCOUNTER — HOSPITAL ENCOUNTER (INPATIENT)
Facility: HOSPITAL | Age: 77
LOS: 3 days | Discharge: NURSING, REHAB OR LONG-TERM CARE | DRG: 190 | End: 2022-06-09
Attending: EMERGENCY MEDICINE | Admitting: STUDENT IN AN ORGANIZED HEALTH CARE EDUCATION/TRAINING PROGRAM
Payer: MEDICARE

## 2022-06-05 ENCOUNTER — APPOINTMENT (EMERGENCY)
Dept: RADIOLOGY | Facility: HOSPITAL | Age: 77
DRG: 190 | End: 2022-06-05
Payer: MEDICARE

## 2022-06-05 DIAGNOSIS — J44.1 COPD EXACERBATION (HCC): ICD-10-CM

## 2022-06-05 DIAGNOSIS — J43.2 CENTRILOBULAR EMPHYSEMA (HCC): ICD-10-CM

## 2022-06-05 DIAGNOSIS — R42 LIGHTHEADEDNESS: ICD-10-CM

## 2022-06-05 DIAGNOSIS — R06.00 DYSPNEA: Primary | ICD-10-CM

## 2022-06-05 DIAGNOSIS — G20 PARKINSON'S DISEASE (HCC): ICD-10-CM

## 2022-06-05 DIAGNOSIS — E88.01 ALPHA-1-ANTITRYPSIN DEFICIENCY (HCC): ICD-10-CM

## 2022-06-05 PROBLEM — D69.6 THROMBOCYTOPENIA (HCC): Status: RESOLVED | Noted: 2020-01-20 | Resolved: 2022-06-05

## 2022-06-05 PROBLEM — K59.09 OTHER CONSTIPATION: Status: RESOLVED | Noted: 2019-12-16 | Resolved: 2022-06-05

## 2022-06-05 PROBLEM — N40.0 BPH (BENIGN PROSTATIC HYPERPLASIA): Status: ACTIVE | Noted: 2017-12-30

## 2022-06-05 PROBLEM — B00.1 HERPES LABIALIS: Status: RESOLVED | Noted: 2019-05-13 | Resolved: 2022-06-05

## 2022-06-05 PROBLEM — R97.20 ELEVATED PSA: Status: RESOLVED | Noted: 2018-10-25 | Resolved: 2022-06-05

## 2022-06-05 PROBLEM — I15.8 OTHER SECONDARY HYPERTENSION: Status: RESOLVED | Noted: 2022-04-27 | Resolved: 2022-06-05

## 2022-06-05 LAB
2HR DELTA HS TROPONIN: -1 NG/L
4HR DELTA HS TROPONIN: -2 NG/L
ALBUMIN SERPL BCP-MCNC: 3.7 G/DL (ref 3.5–5)
ALP SERPL-CCNC: 77 U/L (ref 46–116)
ALT SERPL W P-5'-P-CCNC: 16 U/L (ref 12–78)
ANION GAP SERPL CALCULATED.3IONS-SCNC: 9 MMOL/L (ref 4–13)
AST SERPL W P-5'-P-CCNC: 27 U/L (ref 5–45)
BASOPHILS # BLD AUTO: 0.04 THOUSANDS/ΜL (ref 0–0.1)
BASOPHILS NFR BLD AUTO: 1 % (ref 0–1)
BILIRUB SERPL-MCNC: 1.28 MG/DL (ref 0.2–1)
BUN SERPL-MCNC: 15 MG/DL (ref 5–25)
CALCIUM SERPL-MCNC: 8.8 MG/DL (ref 8.3–10.1)
CARDIAC TROPONIN I PNL SERPL HS: 10 NG/L
CARDIAC TROPONIN I PNL SERPL HS: 8 NG/L
CARDIAC TROPONIN I PNL SERPL HS: 9 NG/L
CHLORIDE SERPL-SCNC: 106 MMOL/L (ref 100–108)
CO2 SERPL-SCNC: 28 MMOL/L (ref 21–32)
CREAT SERPL-MCNC: 1.21 MG/DL (ref 0.6–1.3)
EOSINOPHIL # BLD AUTO: 0.12 THOUSAND/ΜL (ref 0–0.61)
EOSINOPHIL NFR BLD AUTO: 2 % (ref 0–6)
ERYTHROCYTE [DISTWIDTH] IN BLOOD BY AUTOMATED COUNT: 14.6 % (ref 11.6–15.1)
FLUAV RNA RESP QL NAA+PROBE: NEGATIVE
FLUBV RNA RESP QL NAA+PROBE: NEGATIVE
GFR SERPL CREATININE-BSD FRML MDRD: 56 ML/MIN/1.73SQ M
GLUCOSE SERPL-MCNC: 136 MG/DL (ref 65–140)
HCT VFR BLD AUTO: 38.8 % (ref 36.5–49.3)
HGB BLD-MCNC: 13.2 G/DL (ref 12–17)
IMM GRANULOCYTES # BLD AUTO: 0.06 THOUSAND/UL (ref 0–0.2)
IMM GRANULOCYTES NFR BLD AUTO: 1 % (ref 0–2)
LYMPHOCYTES # BLD AUTO: 2.48 THOUSANDS/ΜL (ref 0.6–4.47)
LYMPHOCYTES NFR BLD AUTO: 42 % (ref 14–44)
MCH RBC QN AUTO: 32 PG (ref 26.8–34.3)
MCHC RBC AUTO-ENTMCNC: 34 G/DL (ref 31.4–37.4)
MCV RBC AUTO: 94 FL (ref 82–98)
MONOCYTES # BLD AUTO: 0.51 THOUSAND/ΜL (ref 0.17–1.22)
MONOCYTES NFR BLD AUTO: 9 % (ref 4–12)
NEUTROPHILS # BLD AUTO: 2.7 THOUSANDS/ΜL (ref 1.85–7.62)
NEUTS SEG NFR BLD AUTO: 45 % (ref 43–75)
NRBC BLD AUTO-RTO: 0 /100 WBCS
NT-PROBNP SERPL-MCNC: 193 PG/ML
PLATELET # BLD AUTO: 149 THOUSANDS/UL (ref 149–390)
PLATELET # BLD AUTO: 181 THOUSANDS/UL (ref 149–390)
PMV BLD AUTO: 8.9 FL (ref 8.9–12.7)
PMV BLD AUTO: 9.4 FL (ref 8.9–12.7)
POTASSIUM SERPL-SCNC: 4.2 MMOL/L (ref 3.5–5.3)
PROCALCITONIN SERPL-MCNC: 0.08 NG/ML
PROT SERPL-MCNC: 6.7 G/DL (ref 6.4–8.2)
RBC # BLD AUTO: 4.12 MILLION/UL (ref 3.88–5.62)
RSV RNA RESP QL NAA+PROBE: NEGATIVE
SARS-COV-2 RNA RESP QL NAA+PROBE: NEGATIVE
SODIUM SERPL-SCNC: 143 MMOL/L (ref 136–145)
WBC # BLD AUTO: 5.91 THOUSAND/UL (ref 4.31–10.16)

## 2022-06-05 PROCEDURE — 93005 ELECTROCARDIOGRAM TRACING: CPT

## 2022-06-05 PROCEDURE — 99220 PR INITIAL OBSERVATION CARE/DAY 70 MINUTES: CPT | Performed by: STUDENT IN AN ORGANIZED HEALTH CARE EDUCATION/TRAINING PROGRAM

## 2022-06-05 PROCEDURE — 99285 EMERGENCY DEPT VISIT HI MDM: CPT | Performed by: EMERGENCY MEDICINE

## 2022-06-05 PROCEDURE — 80053 COMPREHEN METABOLIC PANEL: CPT | Performed by: EMERGENCY MEDICINE

## 2022-06-05 PROCEDURE — 94760 N-INVAS EAR/PLS OXIMETRY 1: CPT

## 2022-06-05 PROCEDURE — 94644 CONT INHLJ TX 1ST HOUR: CPT

## 2022-06-05 PROCEDURE — 71045 X-RAY EXAM CHEST 1 VIEW: CPT

## 2022-06-05 PROCEDURE — 0241U HB NFCT DS VIR RESP RNA 4 TRGT: CPT | Performed by: STUDENT IN AN ORGANIZED HEALTH CARE EDUCATION/TRAINING PROGRAM

## 2022-06-05 PROCEDURE — 96374 THER/PROPH/DIAG INJ IV PUSH: CPT

## 2022-06-05 PROCEDURE — 84145 PROCALCITONIN (PCT): CPT

## 2022-06-05 PROCEDURE — 84484 ASSAY OF TROPONIN QUANT: CPT | Performed by: EMERGENCY MEDICINE

## 2022-06-05 PROCEDURE — 83880 ASSAY OF NATRIURETIC PEPTIDE: CPT | Performed by: EMERGENCY MEDICINE

## 2022-06-05 PROCEDURE — 96365 THER/PROPH/DIAG IV INF INIT: CPT

## 2022-06-05 PROCEDURE — 99285 EMERGENCY DEPT VISIT HI MDM: CPT

## 2022-06-05 PROCEDURE — 36415 COLL VENOUS BLD VENIPUNCTURE: CPT | Performed by: EMERGENCY MEDICINE

## 2022-06-05 PROCEDURE — 85049 AUTOMATED PLATELET COUNT: CPT | Performed by: STUDENT IN AN ORGANIZED HEALTH CARE EDUCATION/TRAINING PROGRAM

## 2022-06-05 PROCEDURE — 85025 COMPLETE CBC W/AUTO DIFF WBC: CPT | Performed by: EMERGENCY MEDICINE

## 2022-06-05 PROCEDURE — 94640 AIRWAY INHALATION TREATMENT: CPT

## 2022-06-05 RX ORDER — BUSPIRONE HYDROCHLORIDE 5 MG/1
10 TABLET ORAL 3 TIMES DAILY
Status: DISCONTINUED | OUTPATIENT
Start: 2022-06-05 | End: 2022-06-09 | Stop reason: HOSPADM

## 2022-06-05 RX ORDER — IPRATROPIUM BROMIDE AND ALBUTEROL SULFATE 2.5; .5 MG/3ML; MG/3ML
3 SOLUTION RESPIRATORY (INHALATION)
Status: DISCONTINUED | OUTPATIENT
Start: 2022-06-05 | End: 2022-06-09 | Stop reason: HOSPADM

## 2022-06-05 RX ORDER — BUDESONIDE 0.5 MG/2ML
0.5 INHALANT ORAL
Status: DISCONTINUED | OUTPATIENT
Start: 2022-06-05 | End: 2022-06-09 | Stop reason: HOSPADM

## 2022-06-05 RX ORDER — ENOXAPARIN SODIUM 100 MG/ML
40 INJECTION SUBCUTANEOUS DAILY
Status: DISCONTINUED | OUTPATIENT
Start: 2022-06-06 | End: 2022-06-09 | Stop reason: HOSPADM

## 2022-06-05 RX ORDER — MIRTAZAPINE 15 MG/1
30 TABLET, FILM COATED ORAL
Status: DISCONTINUED | OUTPATIENT
Start: 2022-06-05 | End: 2022-06-09 | Stop reason: HOSPADM

## 2022-06-05 RX ORDER — METHYLPREDNISOLONE SODIUM SUCCINATE 125 MG/2ML
125 INJECTION, POWDER, LYOPHILIZED, FOR SOLUTION INTRAMUSCULAR; INTRAVENOUS ONCE
Status: COMPLETED | OUTPATIENT
Start: 2022-06-05 | End: 2022-06-05

## 2022-06-05 RX ORDER — ACETAMINOPHEN 325 MG/1
650 TABLET ORAL EVERY 6 HOURS PRN
Status: DISCONTINUED | OUTPATIENT
Start: 2022-06-05 | End: 2022-06-09 | Stop reason: HOSPADM

## 2022-06-05 RX ORDER — FINASTERIDE 5 MG/1
5 TABLET, FILM COATED ORAL EVERY MORNING
Status: DISCONTINUED | OUTPATIENT
Start: 2022-06-06 | End: 2022-06-09 | Stop reason: HOSPADM

## 2022-06-05 RX ORDER — TAMSULOSIN HYDROCHLORIDE 0.4 MG/1
0.4 CAPSULE ORAL
Status: DISCONTINUED | OUTPATIENT
Start: 2022-06-05 | End: 2022-06-09 | Stop reason: HOSPADM

## 2022-06-05 RX ORDER — GABAPENTIN 300 MG/1
300 CAPSULE ORAL 3 TIMES DAILY
Status: DISCONTINUED | OUTPATIENT
Start: 2022-06-05 | End: 2022-06-09 | Stop reason: HOSPADM

## 2022-06-05 RX ORDER — IPRATROPIUM BROMIDE AND ALBUTEROL SULFATE 2.5; .5 MG/3ML; MG/3ML
3 SOLUTION RESPIRATORY (INHALATION) ONCE
Status: COMPLETED | OUTPATIENT
Start: 2022-06-05 | End: 2022-06-05

## 2022-06-05 RX ORDER — CHOLESTYRAMINE LIGHT 4 G/5.7G
4 POWDER, FOR SUSPENSION ORAL DAILY
Status: DISCONTINUED | OUTPATIENT
Start: 2022-06-06 | End: 2022-06-09 | Stop reason: HOSPADM

## 2022-06-05 RX ORDER — GUAIFENESIN 100 MG/5ML
200 SOLUTION ORAL EVERY 4 HOURS
Status: DISCONTINUED | OUTPATIENT
Start: 2022-06-05 | End: 2022-06-09 | Stop reason: HOSPADM

## 2022-06-05 RX ORDER — IPRATROPIUM BROMIDE AND ALBUTEROL SULFATE 2.5; .5 MG/3ML; MG/3ML
3 SOLUTION RESPIRATORY (INHALATION) 4 TIMES DAILY
Status: DISCONTINUED | OUTPATIENT
Start: 2022-06-05 | End: 2022-06-05

## 2022-06-05 RX ORDER — MAGNESIUM SULFATE HEPTAHYDRATE 40 MG/ML
2 INJECTION, SOLUTION INTRAVENOUS ONCE
Status: COMPLETED | OUTPATIENT
Start: 2022-06-05 | End: 2022-06-05

## 2022-06-05 RX ORDER — TAMSULOSIN HYDROCHLORIDE 0.4 MG/1
0.4 CAPSULE ORAL DAILY
Status: DISCONTINUED | OUTPATIENT
Start: 2022-06-06 | End: 2022-06-05

## 2022-06-05 RX ORDER — AMLODIPINE BESYLATE 5 MG/1
10 TABLET ORAL DAILY
Status: DISCONTINUED | OUTPATIENT
Start: 2022-06-06 | End: 2022-06-09 | Stop reason: HOSPADM

## 2022-06-05 RX ORDER — METHYLPREDNISOLONE SODIUM SUCCINATE 40 MG/ML
40 INJECTION, POWDER, LYOPHILIZED, FOR SOLUTION INTRAMUSCULAR; INTRAVENOUS EVERY 8 HOURS SCHEDULED
Status: DISCONTINUED | OUTPATIENT
Start: 2022-06-06 | End: 2022-06-07

## 2022-06-05 RX ORDER — MIDODRINE HYDROCHLORIDE 5 MG/1
10 TABLET ORAL
Status: DISCONTINUED | OUTPATIENT
Start: 2022-06-06 | End: 2022-06-09 | Stop reason: HOSPADM

## 2022-06-05 RX ORDER — PANTOPRAZOLE SODIUM 40 MG/1
40 TABLET, DELAYED RELEASE ORAL 2 TIMES DAILY
Status: DISCONTINUED | OUTPATIENT
Start: 2022-06-05 | End: 2022-06-09 | Stop reason: HOSPADM

## 2022-06-05 RX ORDER — FLUTICASONE PROPIONATE 50 MCG
2 SPRAY, SUSPENSION (ML) NASAL DAILY
Status: DISCONTINUED | OUTPATIENT
Start: 2022-06-06 | End: 2022-06-09 | Stop reason: HOSPADM

## 2022-06-05 RX ADMIN — BUDESONIDE 0.5 MG: 0.5 INHALANT ORAL at 22:02

## 2022-06-05 RX ADMIN — METOPROLOL TARTRATE 25 MG: 25 TABLET, FILM COATED ORAL at 21:53

## 2022-06-05 RX ADMIN — METHYLPREDNISOLONE SODIUM SUCCINATE 125 MG: 125 INJECTION, POWDER, FOR SOLUTION INTRAMUSCULAR; INTRAVENOUS at 16:48

## 2022-06-05 RX ADMIN — MIRTAZAPINE 30 MG: 15 TABLET, FILM COATED ORAL at 21:53

## 2022-06-05 RX ADMIN — TAMSULOSIN HYDROCHLORIDE 0.4 MG: 0.4 CAPSULE ORAL at 21:53

## 2022-06-05 RX ADMIN — IPRATROPIUM BROMIDE AND ALBUTEROL SULFATE 3 ML: 2.5; .5 SOLUTION RESPIRATORY (INHALATION) at 22:02

## 2022-06-05 RX ADMIN — PANTOPRAZOLE SODIUM 40 MG: 40 TABLET, DELAYED RELEASE ORAL at 21:53

## 2022-06-05 RX ADMIN — IPRATROPIUM BROMIDE AND ALBUTEROL SULFATE 3 ML: 2.5; .5 SOLUTION RESPIRATORY (INHALATION) at 15:03

## 2022-06-05 RX ADMIN — BUSPIRONE HYDROCHLORIDE 10 MG: 5 TABLET ORAL at 21:53

## 2022-06-05 RX ADMIN — MAGNESIUM SULFATE HEPTAHYDRATE 2 G: 40 INJECTION, SOLUTION INTRAVENOUS at 16:49

## 2022-06-05 RX ADMIN — CARBIDOPA AND LEVODOPA 1 TABLET: 25; 100 TABLET ORAL at 21:53

## 2022-06-05 RX ADMIN — GUAIFENESIN 200 MG: 100 SOLUTION ORAL at 19:25

## 2022-06-05 RX ADMIN — ALBUTEROL SULFATE 10 MG: 2.5 SOLUTION RESPIRATORY (INHALATION) at 16:57

## 2022-06-05 RX ADMIN — GABAPENTIN 300 MG: 300 CAPSULE ORAL at 21:53

## 2022-06-05 NOTE — H&P
H&P Exam - Ryan Baeza 65 y o  male MRN: [id number]    Unit/Bed#: ED 09 Encounter: [id number]    Assessment:  65-year-old male with H/O alpha-1 antitrypsin deficiency, central lobular emphysema on 2 L home O2, Parkinson's disease, HTN presents to the ED with 3 days H/O worsening SOB with increased cough  Patient is admitted for COPD exacerbation under the supervision of Dr Davian Velarde  Estimated LOS less than 2 midnight  Plan:    * COPD with acute exacerbation ([facility] )  Assessment & Plan  Acute on chronic  Patient with history of AAT deficiency and centrilobular emphysema presents with worsening sob over the last couple days with no relief of symptoms with use of nebulizer at home  He is on home oxygen baseline 2-3 L  At the ER he received DuoNebs, Solu-Medrol, albuterol  · Chest x-ray results pending  · Solu Medrol 40 mg q 8h  · Continue home Pulmicort  · Continue albuterol q 4h p r n  · DuoNebs 4 times daily  · Continues pulse ox  · Spirometry  · Keep SpO2 above 92%  · Patient receives weekly Zemaira injections for AAT, will order when indicated patient remains in hospital     Acute and chronic respiratory failure with hypoxia ([place] [address] )  Assessment & Plan  Acute on chronic  Secondary to AAT and centrilobular emphysema with exacerbation of symptoms as evidence by respiratory distress, increased requirement for bronchodilators, and tachypnea  · See plan above      Parkinson's disease ([place] [address] )  Assessment & Plan  Chronic  · Continue carbidopa-levodopa    Depression, recurrent ([place] [address] )  Assessment & Plan  Chronic, stable  · Continue home medications mirtazapine and BuSpar    Peripheral neuropathy  Assessment & Plan  Chronic, stable  · Continue gabapentin    Ambulatory dysfunction  Assessment & Plan  Chronic  · Fall precautions  · PT/OT    Insomnia  Assessment & Plan  Chronic, stable  · Continue home trazodone and mirtazapine    BPH (benign prostatic hyperplasia)  Assessment & Plan  Chronic, stable  · Continue Flomax and finasteride    Essential hypertension  Assessment & Plan  Chronic, stable  · Monitor blood pressures  · Continue home medications Lopressor and Norvasc  · Continue midodrine with hold parameters    Gastroesophageal reflux disease  Assessment & Plan  Chronic, stable  · Continue home Protonix 40 mg b i d  Alpha-1-antitrypsin deficiency [facility])  Assessment & Plan  He is on Zemaira once weekly  Continue home medication when indicated  Global:  PO fluid, regular diet, replete e-lytes prn  VTE: SQL and SCD    History of Present Illness     74-year-old male with H/O alpha-1 antitrypsin deficiency, central lobular emphysema on 3 L home O2, Parkinson's disease, HTN presents to the ED with 3 days H/O worsening SOB with increased cough  Patient reports having to increase his nebulizer treatments to BID within the past week  He is not able to bring up any mucus  He denies any allergies or sick contact  Patient also reported epigastric tenderness that woke him up around 4:00 a m  This morning  Patient has 2 pillow orthopnea, but denies PND or leg swelling  He reported ongoing right lower quadrant pain  Last bowel movement this morning, and reports passing flatus  ED course:  DuoNeb 3 mL, Solu-Medrol 125 mg, albuterol nebulizer, Mag sulfate 2 g    Review of Systems   Constitutional: Negative for chills and fever  HENT: Negative for ear pain and sore throat  Eyes: Negative for pain and visual disturbance  Respiratory: Positive for cough, shortness of breath and wheezing  Cardiovascular: Positive for chest pain  Negative for palpitations  Gastrointestinal: Negative for abdominal pain and vomiting  Genitourinary: Negative for dysuria and hematuria  Musculoskeletal: Negative for arthralgias and back pain  Skin: Negative for color change and rash  Neurological: Positive for light-headedness  Negative for seizures, syncope and headaches  All other systems reviewed and are negative        Historical Information   Past Medical History:   Diagnosis Date    Anesthesia complication     Difficult to wake up    Arthritis     BPH (benign prostatic hyperplasia)     urinary frequency    Cancer (HCC)     basal cell neck, face    COPD (chronic obstructive pulmonary disease) (HCC)     COPD exacerbation (HCC) 12/29/2019    Elevated PSA 10/25/2018    Full dentures     Hiatal hernia     History of methicillin resistant staphylococcus aureus (MRSA)     10/11/2018 MRSA (nares) positive    Hypertension     Irritable bowel syndrome     Kidney stone     at least 7 episodes    Liver disease     Alpha 1- enzyme deficiency - diagnosed 2002  has been on weekly replacement therapy since then    Pulmonary emphysema ([address] )     1/25/15  FEV1 - 2 45 liters or 59% of predicted    RSV infection 12/2017    Wears glasses     for driving only     Past Surgical History:   Procedure Laterality Date    BACK SURGERY  2008    discectomy    CARDIAC CATHETERIZATION N/A 5/3/2022    Procedure: Cardiac catheterization both left and right heart catheterization with vaso dilatory trial;  Surgeon: Shaji Rosario MD;  Location: [place] [address] CATH LAB; Service: Cardiology    CARDIAC CATHETERIZATION Left 5/3/2022    Procedure: Cardiac Left Heart Cath;  Surgeon: Shaji Rosario MD;  Location: [place] [address] CATH LAB; Service: Cardiology    CARDIAC CATHETERIZATION Left 5/3/2022    Procedure: Cardiac Left Ventriculogram;  Surgeon: Shaji Rosario MD;  Location: [place] [address] CATH LAB; Service: Cardiology    COLONOSCOPY      COLONOSCOPY N/A 3/10/2017    Procedure: Ebonie Giordano;  Surgeon: Vitaliy Roberts MD;  Location: [facility] GI LAB; Service:    Shyanne Mendez      removal of kidney stones    ESOPHAGOGASTRODUODENOSCOPY N/A 3/10/2017    Procedure: ESOPHAGOGASTRODUODENOSCOPY (EGD); Surgeon: Vitaliy Roberts MD;  Location: [facility] GI LAB;   Service:     LITHOTRIPSY      AR ESOPHAGOGASTRODUODENOSCOPY TRANSORAL DIAGNOSTIC N/A 11/20/2018    Procedure: ESOPHAGOGASTRODUODENOSCOPY (EGD); Surgeon: Wilfred Calvert MD;  Location: [facility] GI LAB; Service: Gastroenterology    TONSILLECTOMY      VEIN LIGATION AND STRIPPING Bilateral          Social History   Social History     Substance and Sexual Activity   Alcohol Use Not Currently    Comment: stopped in      Social History     Substance and Sexual Activity   Drug Use Not Currently     Social History     Tobacco Use   Smoking Status Former Smoker    Packs/day: 1 00    Years: 60 00    Pack years: 60 00    Quit date: 2017    Years since quittin 7   Smokeless Tobacco Never Used   Tobacco Comment    quit in 2017     E-Cigarette Use: Never User     E-Cigarette/Vaping Substances    Nicotine No     THC No     CBD No     Flavoring No     Other No     Unknown No        Family History:   Family History   Problem Relation Age of Onset    Emphysema Mother         never smoked    Emphysema Father     Cancer Brother         colon    Colon cancer Brother     Ulcerative colitis Family     Liver disease Family        Meds/Allergies   PTA meds:   Prior to Admission Medications   Prescriptions Last Dose Informant Patient Reported? Taking? Diclofenac Sodium (VOLTAREN) 1 %  Self No No   Sig: Apply 2 g topically 4 (four) times a day for 7 days Apply to R knee   Patient not taking: Reported on 2022   OXYGEN-HELIUM IN  Self Yes No   Sig: Inhale 2L at rest- 3L with activity   Ventolin  (90 Base) MCG/ACT inhaler  Self No No   Sig: Inhale 2 puffs every 4 (four) hours as needed for wheezing   ZEMAIRA infusion  Self Yes No   Sig: once a week    amLODIPine (NORVASC) 10 mg tablet  Self No No   Sig: TAKE ONE TABLET BY MOUTH DAILY    budesonide (PULMICORT) 0 5 mg/2 mL nebulizer solution   No No   Sig: Take 2 mL (0 5 mg total) by nebulization in the morning and 2 mL (0 5 mg total) in the evening     busPIRone (BUSPAR) 10 mg tablet   No No   Sig: TAKE ONE TABLET BY MOUTH THREE TIMES DAILY carbidopa-levodopa (SINEMET)  mg per tablet   No No   Sig: Take 1 tablet by mouth in the morning and 1 tablet in the evening and 1 tablet before bedtime  cholestyramine (QUESTRAN) 4 g packet   No No   Sig: Take 1 packet (4 g total) by mouth in the morning  finasteride (PROSCAR) 5 mg tablet  Self No No   Sig: TAKE ONE TABLET BY MOUTH IN THE MORNING    fluticasone (FLONASE) 50 mcg/act nasal spray  Self No No   Si sprays into each nostril daily   gabapentin (NEURONTIN) 300 mg capsule   No No   Sig: TAKE 1 CAPSULE BY MOUTH 3 TIMES DAILY   ipratropium-albuterol (DUO-NEB) 0 5-2 5 mg/3 mL nebulizer solution  Self No No   Sig: Take 3 mL by nebulization 4 (four) times a day   metoprolol tartrate (LOPRESSOR) 25 mg tablet   No No   Sig: Take 1 tablet (25 mg total) by mouth every 12 (twelve) hours   midodrine (PROAMATINE) 10 MG tablet  Self No No   Sig: Take 1 tab three times daily   Please hold medication if blood pressure is above 580 systolic    mirtazapine (REMERON) 15 mg tablet   No No   Sig: Take 2 tablets (30 mg total) by mouth daily at bedtime   pantoprazole (PROTONIX) 40 mg tablet   No No   Sig: TAKE ONE TABLET BY MOUTH TWICE DAILY   tamsulosin (FLOMAX) 0 4 mg   No No   Sig: TAKE ONE CAPSULE BY MOUTH ONE TIME DAILY      Facility-Administered Medications: None     Allergies   Allergen Reactions    Chantix [Varenicline]      Caused prostate infection       Objective   First Vitals:   Blood Pressure: 135/99 (22 144)  Pulse: 80 (22 144)  Temperature: (!) 97 2 °F (36 2 °C) (22 144)  Temp Source: Tympanic (22)  Respirations: (!) 24 (22)  Weight - Scale: 86 3 kg (190 lb 4 1 oz) (22)  SpO2: 97 % (22)    Current Vitals:   Blood Pressure: 135/99 (22 1446)  Pulse: 80 (22 144)  Temperature: (!) 97 2 °F (36 2 °C) (22)  Temp Source: Tympanic (22)  Respirations: (!) 24 (22)  Weight - Scale: 86 3 kg (190 lb 4 1 oz) (06/05/22 1446)  SpO2: 96 % (06/05/22 1657)    No intake or output data in the 24 hours ending 06/05/22 5678    Invasive Devices  Report    Peripheral Intravenous Line  Duration           Peripheral IV 05/02/22 Right;Ventral (anterior) Forearm 34 days    Peripheral IV 06/05/22 Dorsal (posterior); Right Wrist <1 day    Peripheral IV 06/05/22 Right Antecubital <1 day                Physical Exam  Vitals and nursing note reviewed  Constitutional:       Appearance: He is well-developed  HENT:      Head: Normocephalic and atraumatic  Eyes:      Conjunctiva/sclera: Conjunctivae normal    Cardiovascular:      Rate and Rhythm: Normal rate and regular rhythm  Pulses: Normal pulses  Heart sounds: Normal heart sounds  No murmur heard  Pulmonary:      Effort: Pulmonary effort is normal  No respiratory distress  Breath sounds: Wheezing present  Comments: Cough sounds to have mucus and transmitted sounds from upper airway  Abdominal:      General: Bowel sounds are normal  There is distension  Palpations: Abdomen is soft  There is no mass  Tenderness: There is abdominal tenderness (Generalized tenderness, worse on right lower quadrant)  There is no guarding or rebound  Musculoskeletal:      Cervical back: Neck supple  Right lower leg: No edema  Left lower leg: No edema  Skin:     General: Skin is warm and dry  Neurological:      General: No focal deficit present  Mental Status: He is alert     Psychiatric:         Mood and Affect: Mood normal        Lab Results:   Results for orders placed or performed during the hospital encounter of 06/05/22   CBC and differential   Result Value Ref Range    WBC 5 91 4 31 - 10 16 Thousand/uL    RBC 4 12 3 88 - 5 62 Million/uL    Hemoglobin 13 2 12 0 - 17 0 g/dL    Hematocrit 38 8 36 5 - 49 3 %    MCV 94 82 - 98 fL    MCH 32 0 26 8 - 34 3 pg    MCHC 34 0 31 4 - 37 4 g/dL    RDW 14 6 11 6 - 15 1 %    MPV 8 9 8 9 - 12 7 fL Platelets 929 217 - 734 Thousands/uL    nRBC 0 /100 WBCs    Neutrophils Relative 45 43 - 75 %    Immat GRANS % 1 0 - 2 %    Lymphocytes Relative 42 14 - 44 %    Monocytes Relative 9 4 - 12 %    Eosinophils Relative 2 0 - 6 %    Basophils Relative 1 0 - 1 %    Neutrophils Absolute 2 70 1 85 - 7 62 Thousands/µL    Immature Grans Absolute 0 06 0 00 - 0 20 Thousand/uL    Lymphocytes Absolute 2 48 0 60 - 4 47 Thousands/µL    Monocytes Absolute 0 51 0 17 - 1 22 Thousand/µL    Eosinophils Absolute 0 12 0 00 - 0 61 Thousand/µL    Basophils Absolute 0 04 0 00 - 0 10 Thousands/µL   Comprehensive metabolic panel   Result Value Ref Range    Sodium 143 136 - 145 mmol/L    Potassium 4 2 3 5 - 5 3 mmol/L    Chloride 106 100 - 108 mmol/L    CO2 28 21 - 32 mmol/L    ANION GAP 9 4 - 13 mmol/L    BUN 15 5 - 25 mg/dL    Creatinine 1 21 0 60 - 1 30 mg/dL    Glucose 136 65 - 140 mg/dL    Calcium 8 8 8 3 - 10 1 mg/dL    AST 27 5 - 45 U/L    ALT 16 12 - 78 U/L    Alkaline Phosphatase 77 46 - 116 U/L    Total Protein 6 7 6 4 - 8 2 g/dL    Albumin 3 7 3 5 - 5 0 g/dL    Total Bilirubin 1 28 (H) 0 20 - 1 00 mg/dL    eGFR 56 ml/min/1 73sq m   HS Troponin 0hr (reflex protocol)   Result Value Ref Range    hs TnI 0hr 10 "Refer to ACS Flowchart"- see link ng/L   NT-BNP PRO   Result Value Ref Range    NT-proBNP 193 <450 pg/mL   HS Troponin I 2hr   Result Value Ref Range    hs TnI 2hr 9 "Refer to ACS Flowchart"- see link ng/L    Delta 2hr hsTnI -1 <20 ng/L     *Note: Due to a large number of results and/or encounters for the requested time period, some results have not been displayed  A complete set of results can be found in Results Review  Imaging:   XR chest 1 view portable   ED Interpretation   No change from previous          EKG, Pathology, and Other Studies:  Normal sinus rhythm on EKG    Code Status: Prior  Advance Directive and Living Will:      Power of :    POLST:      Counseling / Coordination of Care:    Total floor / unit time spent today 30 minutes

## 2022-06-05 NOTE — ASSESSMENT & PLAN NOTE
Chronic, stable  · Monitor blood pressures  · Continue home medications Lopressor and Norvasc  · Continue midodrine with hold parameters

## 2022-06-05 NOTE — ASSESSMENT & PLAN NOTE
Acute on chronic  Secondary to AAT and centrilobular emphysema with exacerbation of symptoms as evidence by respiratory distress, increased requirement for bronchodilators, and tachypnea  · See plan above

## 2022-06-05 NOTE — ASSESSMENT & PLAN NOTE
Acute on chronic  Patient with history of AAT deficiency and centrilobular emphysema presents with worsening sob over the last couple days with no relief of symptoms with use of nebulizer at home  He is on home oxygen baseline 2-3 L  At the ER he received DuoNebs, Solu-Medrol, albuterol  · Solu Medrol 20 mg q 8h  · Continue home Pulmicort  · Continue albuterol q 4h p r n    · DuoNebs 4 times daily  · Continues pulse ox  · Spirometry  · Keep SpO2 above 92%  · Patient receives weekly Zemaira injections for AAT, will order when indicated patient remains in hospital

## 2022-06-05 NOTE — ASSESSMENT & PLAN NOTE
Chronic  · Continue carbidopa-levodopa  · Video barium swallow  · Speech consult  · Neurology consult

## 2022-06-05 NOTE — ED PROVIDER NOTES
History  Chief Complaint   Patient presents with    Shortness of Breath     Pt reports chronic SOB worse X 2 days  Lungs dim with exp wheeze  Pt on chronic 3L O2 NC       64 yo male c/o worsening sob over the last couple days, severe today  Pt  Says he feels lightheaded like he may pass out  No relief with neb at home  Pt  Is on baseline O2 all the time  No fever  + cough - non-productive  No chest pain  History provided by:  Patient   used: No        Prior to Admission Medications   Prescriptions Last Dose Informant Patient Reported? Taking? Diclofenac Sodium (VOLTAREN) 1 %  Self No No   Sig: Apply 2 g topically 4 (four) times a day for 7 days Apply to R knee   Patient not taking: Reported on 2022   OXYGEN-HELIUM IN  Self Yes No   Sig: Inhale 2L at rest- 3L with activity   Ventolin  (90 Base) MCG/ACT inhaler  Self No No   Sig: Inhale 2 puffs every 4 (four) hours as needed for wheezing   ZEMAIRA infusion  Self Yes No   Sig: once a week    amLODIPine (NORVASC) 10 mg tablet  Self No No   Sig: TAKE ONE TABLET BY MOUTH DAILY    budesonide (PULMICORT) 0 5 mg/2 mL nebulizer solution   No No   Sig: Take 2 mL (0 5 mg total) by nebulization in the morning and 2 mL (0 5 mg total) in the evening  busPIRone (BUSPAR) 10 mg tablet   No No   Sig: TAKE ONE TABLET BY MOUTH THREE TIMES DAILY   carbidopa-levodopa (SINEMET)  mg per tablet   No No   Sig: Take 1 tablet by mouth in the morning and 1 tablet in the evening and 1 tablet before bedtime  cholestyramine (QUESTRAN) 4 g packet   No No   Sig: Take 1 packet (4 g total) by mouth in the morning     finasteride (PROSCAR) 5 mg tablet  Self No No   Sig: TAKE ONE TABLET BY MOUTH IN THE MORNING    fluticasone (FLONASE) 50 mcg/act nasal spray  Self No No   Si sprays into each nostril daily   gabapentin (NEURONTIN) 300 mg capsule   No No   Sig: TAKE 1 CAPSULE BY MOUTH 3 TIMES DAILY   ipratropium-albuterol (DUO-NEB) 0 5-2 5 mg/3 mL nebulizer solution  Self No No   Sig: Take 3 mL by nebulization 4 (four) times a day   metoprolol tartrate (LOPRESSOR) 25 mg tablet   No No   Sig: Take 1 tablet (25 mg total) by mouth every 12 (twelve) hours   midodrine (PROAMATINE) 10 MG tablet  Self No No   Sig: Take 1 tab three times daily  Please hold medication if blood pressure is above 520 systolic    mirtazapine (REMERON) 15 mg tablet   No No   Sig: Take 2 tablets (30 mg total) by mouth daily at bedtime   pantoprazole (PROTONIX) 40 mg tablet   No No   Sig: TAKE ONE TABLET BY MOUTH TWICE DAILY   tamsulosin (FLOMAX) 0 4 mg   No No   Sig: TAKE ONE CAPSULE BY MOUTH ONE TIME DAILY      Facility-Administered Medications: None       Past Medical History:   Diagnosis Date    Anesthesia complication     Difficult to wake up    Arthritis     BPH (benign prostatic hyperplasia)     urinary frequency    Cancer (HCC)     basal cell neck, face    COPD (chronic obstructive pulmonary disease) (HCC)     COPD exacerbation (HCC) 12/29/2019    Full dentures     Hiatal hernia     History of methicillin resistant staphylococcus aureus (MRSA)     10/11/2018 MRSA (nares) positive    Hypertension     Irritable bowel syndrome     Kidney stone     at least 7 episodes    Liver disease     Alpha 1- enzyme deficiency - diagnosed 2002  has been on weekly replacement therapy since then    Pulmonary emphysema ([facility] [address] )     1/25/15  FEV1 - 2 45 liters or 59% of predicted    RSV infection 12/2017    Wears glasses     for driving only       Past Surgical History:   Procedure Laterality Date    BACK SURGERY  2008    discectomy    CARDIAC CATHETERIZATION N/A 5/3/2022    Procedure: Cardiac catheterization both left and right heart catheterization with vaso dilatory trial;  Surgeon: Mary Guidry MD;  Location: [place] [address] CATH LAB;   Service: Cardiology    CARDIAC CATHETERIZATION Left 5/3/2022    Procedure: Cardiac Left Heart Cath;  Surgeon: Mary Guidry MD;  Location: [place] [address] CATH LAB;  Service: Cardiology    CARDIAC CATHETERIZATION Left 5/3/2022    Procedure: Cardiac Left Ventriculogram;  Surgeon: Valeri Euceda MD;  Location: [place] [address] CATH LAB; Service: Cardiology    COLONOSCOPY      COLONOSCOPY N/A 3/10/2017    Procedure: Concepcion Shaffer;  Surgeon: Franklyn Gutierrez MD;  Location: [place] [address] GI LAB; Service:    Flaca Bloom      removal of kidney stones    ESOPHAGOGASTRODUODENOSCOPY N/A 3/10/2017    Procedure: ESOPHAGOGASTRODUODENOSCOPY (EGD); Surgeon: Franklyn Gutierrez MD;  Location: [facility] GI LAB; Service:     LITHOTRIPSY      MD ESOPHAGOGASTRODUODENOSCOPY TRANSORAL DIAGNOSTIC N/A 2018    Procedure: ESOPHAGOGASTRODUODENOSCOPY (EGD); Surgeon: Franklyn Gutierrez MD;  Location: [facility] GI LAB; Service: Gastroenterology    TONSILLECTOMY      VEIN LIGATION AND STRIPPING Bilateral     18's       Family History   Problem Relation Age of Onset    Emphysema Mother         never smoked    Emphysema Father     Cancer Brother         colon    Colon cancer Brother     Ulcerative colitis Family     Liver disease Family      I have reviewed and agree with the history as documented  E-Cigarette/Vaping    E-Cigarette Use Never User      E-Cigarette/Vaping Substances    Nicotine No     THC No     CBD No     Flavoring No     Other No     Unknown No      Social History     Tobacco Use    Smoking status: Former Smoker     Packs/day: 1 00     Years: 60 00     Pack years: 60 00     Quit date: 2017     Years since quittin 7    Smokeless tobacco: Never Used    Tobacco comment: quit in 2017   Vaping Use    Vaping Use: Never used   Substance Use Topics    Alcohol use: Not Currently     Comment: stopped in     Drug use: Not Currently       Review of Systems   Constitutional: Negative  Negative for chills and fever  HENT: Negative  Negative for congestion and sore throat  Eyes: Negative  Respiratory: Positive for cough and shortness of breath  Cardiovascular: Negative  Negative for chest pain and leg swelling  Gastrointestinal: Negative  Negative for abdominal pain, diarrhea, nausea and vomiting  Genitourinary: Negative  Negative for dysuria, flank pain and hematuria  Musculoskeletal: Negative  Negative for back pain and myalgias  Skin: Negative  Negative for rash and wound  Neurological: Positive for weakness and light-headedness  Negative for dizziness and headaches  Psychiatric/Behavioral: Negative  Negative for confusion and hallucinations  The patient is not nervous/anxious  All other systems reviewed and are negative  Physical Exam  Physical Exam  Vitals and nursing note reviewed  Constitutional:       General: He is in acute distress  Appearance: He is well-developed  He is ill-appearing  He is not toxic-appearing or diaphoretic  HENT:      Head: Normocephalic and atraumatic  Right Ear: External ear normal       Left Ear: External ear normal    Eyes:      General: No scleral icterus  Conjunctiva/sclera: Conjunctivae normal    Cardiovascular:      Rate and Rhythm: Normal rate and regular rhythm  Heart sounds: Normal heart sounds  No murmur heard  Pulmonary:      Effort: Tachypnea and respiratory distress present  Breath sounds: Decreased breath sounds and wheezing present  Comments: + frequent cough  Chest:      Chest wall: No tenderness  Abdominal:      General: Bowel sounds are normal  There is no distension  Palpations: Abdomen is soft  Tenderness: There is no abdominal tenderness  Musculoskeletal:         General: No tenderness or deformity  Normal range of motion  Cervical back: Normal range of motion and neck supple  Right lower leg: No edema  Left lower leg: No edema  Skin:     General: Skin is warm and dry  Coloration: Skin is not pale  Findings: No erythema or rash  Neurological:      General: No focal deficit present        Mental Status: He is alert and oriented to person, place, and time  Cranial Nerves: No cranial nerve deficit     Psychiatric:         Mood and Affect: Mood normal          Behavior: Behavior normal          Vital Signs  ED Triage Vitals [06/05/22 1446]   Temperature Pulse Respirations Blood Pressure SpO2   (!) 97 2 °F (36 2 °C) 80 (!) 24 135/99 97 %      Temp Source Heart Rate Source Patient Position - Orthostatic VS BP Location FiO2 (%)   Tympanic Monitor Lying Left arm --      Pain Score       No Pain           Vitals:    06/05/22 1446   BP: 135/99   Pulse: 80   Patient Position - Orthostatic VS: Lying         Visual Acuity      ED Medications  Medications   albuterol CONTINUOUS nebulizing solution (canister) 10 mg (10 mg Nebulization Given 6/5/22 1657)   ipratropium-albuterol (DUO-NEB) 0 5-2 5 mg/3 mL inhalation solution 3 mL (3 mL Nebulization Given 6/5/22 1503)   methylPREDNISolone sodium succinate (Solu-MEDROL) injection 125 mg (125 mg Intravenous Given 6/5/22 1648)   magnesium sulfate 2 g/50 mL IVPB (premix) 2 g (2 g Intravenous New Bag 6/5/22 1649)       Diagnostic Studies  Results Reviewed     Procedure Component Value Units Date/Time    HS Troponin I 4hr [[id number]]     Lab Status: No result Specimen: Blood     HS Troponin 0hr (reflex protocol) [[id number]]  (Normal) Collected: 06/05/22 1503    Lab Status: Final result Specimen: Blood from Arm, Right Updated: 06/05/22 1536     hs TnI 0hr 10 ng/L     HS Troponin I 2hr [[id number]]     Lab Status: No result Specimen: Blood     Comprehensive metabolic panel [[id number]]  (Abnormal) Collected: 06/05/22 1503    Lab Status: Final result Specimen: Blood from Arm, Right Updated: 06/05/22 1534     Sodium 143 mmol/L      Potassium 4 2 mmol/L      Chloride 106 mmol/L      CO2 28 mmol/L      ANION GAP 9 mmol/L      BUN 15 mg/dL      Creatinine 1 21 mg/dL      Glucose 136 mg/dL      Calcium 8 8 mg/dL      AST 27 U/L      ALT 16 U/L      Alkaline Phosphatase 77 U/L      Total Protein 6 7 g/dL      Albumin 3 7 g/dL      Total Bilirubin 1 28 mg/dL      eGFR 56 ml/min/1 73sq m     Narrative:      [place] guidelines for Chronic Kidney Disease (CKD):     Stage 1 with normal or high GFR (GFR > 90 mL/min/1 73 square meters)    Stage 2 Mild CKD (GFR = 60-89 mL/min/1 73 square meters)    Stage 3A Moderate CKD (GFR = 45-59 mL/min/1 73 square meters)    Stage 3B Moderate CKD (GFR = 30-44 mL/min/1 73 square meters)    Stage 4 Severe CKD (GFR = 15-29 mL/min/1 73 square meters)    Stage 5 End Stage CKD (GFR <15 mL/min/1 73 square meters)  Note: GFR calculation is accurate only with a steady state creatinine    NT-BNP PRO [[id number]]  (Normal) Collected: 06/05/22 1503    Lab Status: Final result Specimen: Blood from Arm, Right Updated: 06/05/22 1534     NT-proBNP 193 pg/mL     CBC and differential [[id number]] Collected: 06/05/22 1503    Lab Status: Final result Specimen: Blood from Arm, Right Updated: 06/05/22 1512     WBC 5 91 Thousand/uL      RBC 4 12 Million/uL      Hemoglobin 13 2 g/dL      Hematocrit 38 8 %      MCV 94 fL      MCH 32 0 pg      MCHC 34 0 g/dL      RDW 14 6 %      MPV 8 9 fL      Platelets 774 Thousands/uL      nRBC 0 /100 WBCs      Neutrophils Relative 45 %      Immat GRANS % 1 %      Lymphocytes Relative 42 %      Monocytes Relative 9 %      Eosinophils Relative 2 %      Basophils Relative 1 %      Neutrophils Absolute 2 70 Thousands/µL      Immature Grans Absolute 0 06 Thousand/uL      Lymphocytes Absolute 2 48 Thousands/µL      Monocytes Absolute 0 51 Thousand/µL      Eosinophils Absolute 0 12 Thousand/µL      Basophils Absolute 0 04 Thousands/µL                  XR chest 1 view portable   ED Interpretation by Azra Kang MD (39/17 [id number])   No change from previous                 Procedures  ECG 12 Lead Documentation Only    Date/Time: 6/5/2022 3:13 PM  Performed by: Azra Kang MD  Authorized by: Azra Kang MD     Indications / Diagnosis:  Sob  ECG reviewed by me, the ED Provider: yes    Patient location:  ED  Previous ECG:     Previous ECG:  Compared to current    Similarity:  No change  Interpretation:     Interpretation: normal    Rate:     ECG rate:  77    ECG rate assessment: normal    Rhythm:     Rhythm: sinus rhythm    Ectopy:     Ectopy: none    QRS:     QRS axis:  Normal  Conduction:     Conduction: normal    ST segments:     ST segments:  Normal  T waves:     T waves: normal               ED Course                                             MDM  Number of Diagnoses or Management Options  COPD exacerbation ([address] )  Dyspnea  Lightheadedness  Diagnosis management comments: Will admit to [facility] for COPD exac  Pt  Continues with cough and sob despite nebs, steroids  Disposition  Final diagnoses:   Dyspnea   Lightheadedness   COPD exacerbation ([address] )     Time reflects when diagnosis was documented in both MDM as applicable and the Disposition within this note     Time User Action Codes Description Comment    3984  3:56 PM Delmas Mage Add [C68 78] Dyspnea     5699  2:44 PM Delmas Mage Add [A89] [address] [facility]     5287  5:36 PM Jasmeet  A Add [Z75 9] COPD exacerbation [facility])       ED Disposition     ED Disposition   Admit    Condition   Stable    Date/Time   Sun 2022  5:16 PM    Comment   Case was discussed with *Dr Cameron Shook** and the patient's admission status was agreed to be Admission Status: observation status            Follow-up Information    None         Patient's Medications   Discharge Prescriptions    No medications on file       No discharge procedures on file      PDMP Review       Value Time User    PDMP Reviewed  Yes 2022  9:56 AM Mark Vieyra MD          ED Provider  Electronically Signed by           Jessica Peacock MD  80/52/68 4688

## 2022-06-06 LAB
ANION GAP SERPL CALCULATED.3IONS-SCNC: 13 MMOL/L (ref 4–13)
ATRIAL RATE: 77 BPM
BASOPHILS # BLD AUTO: 0.02 THOUSANDS/ΜL (ref 0–0.1)
BASOPHILS NFR BLD AUTO: 0 % (ref 0–1)
BUN SERPL-MCNC: 17 MG/DL (ref 5–25)
CALCIUM SERPL-MCNC: 9 MG/DL (ref 8.3–10.1)
CHLORIDE SERPL-SCNC: 105 MMOL/L (ref 100–108)
CO2 SERPL-SCNC: 24 MMOL/L (ref 21–32)
CREAT SERPL-MCNC: 1.3 MG/DL (ref 0.6–1.3)
EOSINOPHIL # BLD AUTO: 0 THOUSAND/ΜL (ref 0–0.61)
EOSINOPHIL NFR BLD AUTO: 0 % (ref 0–6)
ERYTHROCYTE [DISTWIDTH] IN BLOOD BY AUTOMATED COUNT: 14.6 % (ref 11.6–15.1)
GFR SERPL CREATININE-BSD FRML MDRD: 52 ML/MIN/1.73SQ M
GLUCOSE P FAST SERPL-MCNC: 155 MG/DL (ref 65–99)
GLUCOSE SERPL-MCNC: 155 MG/DL (ref 65–140)
HCT VFR BLD AUTO: 34.7 % (ref 36.5–49.3)
HEMOCCULT STL QL: NEGATIVE
HEMOCCULT STL QL: NORMAL
HEMOCCULT STL QL: NORMAL
HGB BLD-MCNC: 11.8 G/DL (ref 12–17)
IMM GRANULOCYTES # BLD AUTO: 0.07 THOUSAND/UL (ref 0–0.2)
IMM GRANULOCYTES NFR BLD AUTO: 1 % (ref 0–2)
LYMPHOCYTES # BLD AUTO: 1.34 THOUSANDS/ΜL (ref 0.6–4.47)
LYMPHOCYTES NFR BLD AUTO: 18 % (ref 14–44)
MAGNESIUM SERPL-MCNC: 2 MG/DL (ref 1.6–2.6)
MCH RBC QN AUTO: 31.6 PG (ref 26.8–34.3)
MCHC RBC AUTO-ENTMCNC: 34 G/DL (ref 31.4–37.4)
MCV RBC AUTO: 93 FL (ref 82–98)
MONOCYTES # BLD AUTO: 0.27 THOUSAND/ΜL (ref 0.17–1.22)
MONOCYTES NFR BLD AUTO: 4 % (ref 4–12)
NEUTROPHILS # BLD AUTO: 5.61 THOUSANDS/ΜL (ref 1.85–7.62)
NEUTS SEG NFR BLD AUTO: 77 % (ref 43–75)
NRBC BLD AUTO-RTO: 0 /100 WBCS
PLATELET # BLD AUTO: 154 THOUSANDS/UL (ref 149–390)
PMV BLD AUTO: 9.2 FL (ref 8.9–12.7)
POTASSIUM SERPL-SCNC: 4.4 MMOL/L (ref 3.5–5.3)
PR INTERVAL: 152 MS
QRS AXIS: 59 DEGREES
QRSD INTERVAL: 98 MS
QT INTERVAL: 392 MS
QTC INTERVAL: 443 MS
RBC # BLD AUTO: 3.73 MILLION/UL (ref 3.88–5.62)
SODIUM SERPL-SCNC: 142 MMOL/L (ref 136–145)
T WAVE AXIS: 37 DEGREES
VENTRICULAR RATE: 77 BPM
WBC # BLD AUTO: 7.31 THOUSAND/UL (ref 4.31–10.16)

## 2022-06-06 PROCEDURE — 83735 ASSAY OF MAGNESIUM: CPT | Performed by: STUDENT IN AN ORGANIZED HEALTH CARE EDUCATION/TRAINING PROGRAM

## 2022-06-06 PROCEDURE — 82272 OCCULT BLD FECES 1-3 TESTS: CPT | Performed by: STUDENT IN AN ORGANIZED HEALTH CARE EDUCATION/TRAINING PROGRAM

## 2022-06-06 PROCEDURE — 99223 1ST HOSP IP/OBS HIGH 75: CPT | Performed by: INTERNAL MEDICINE

## 2022-06-06 PROCEDURE — 80048 BASIC METABOLIC PNL TOTAL CA: CPT | Performed by: STUDENT IN AN ORGANIZED HEALTH CARE EDUCATION/TRAINING PROGRAM

## 2022-06-06 PROCEDURE — 94760 N-INVAS EAR/PLS OXIMETRY 1: CPT

## 2022-06-06 PROCEDURE — 99232 SBSQ HOSP IP/OBS MODERATE 35: CPT | Performed by: FAMILY MEDICINE

## 2022-06-06 PROCEDURE — 93010 ELECTROCARDIOGRAM REPORT: CPT | Performed by: INTERNAL MEDICINE

## 2022-06-06 PROCEDURE — 85025 COMPLETE CBC W/AUTO DIFF WBC: CPT

## 2022-06-06 PROCEDURE — 94664 DEMO&/EVAL PT USE INHALER: CPT

## 2022-06-06 PROCEDURE — 97163 PT EVAL HIGH COMPLEX 45 MIN: CPT

## 2022-06-06 PROCEDURE — 97530 THERAPEUTIC ACTIVITIES: CPT

## 2022-06-06 PROCEDURE — 94640 AIRWAY INHALATION TREATMENT: CPT

## 2022-06-06 RX ORDER — LANOLIN ALCOHOL/MO/W.PET/CERES
3 CREAM (GRAM) TOPICAL
Status: DISCONTINUED | OUTPATIENT
Start: 2022-06-06 | End: 2022-06-09 | Stop reason: HOSPADM

## 2022-06-06 RX ADMIN — METHYLPREDNISOLONE SODIUM SUCCINATE 40 MG: 40 INJECTION, POWDER, FOR SOLUTION INTRAMUSCULAR; INTRAVENOUS at 21:15

## 2022-06-06 RX ADMIN — BUSPIRONE HYDROCHLORIDE 10 MG: 5 TABLET ORAL at 21:16

## 2022-06-06 RX ADMIN — ENOXAPARIN SODIUM 40 MG: 40 INJECTION SUBCUTANEOUS at 10:03

## 2022-06-06 RX ADMIN — CARBIDOPA AND LEVODOPA 1 TABLET: 25; 100 TABLET ORAL at 10:03

## 2022-06-06 RX ADMIN — FINASTERIDE 5 MG: 5 TABLET, FILM COATED ORAL at 10:03

## 2022-06-06 RX ADMIN — MIDODRINE HYDROCHLORIDE 10 MG: 5 TABLET ORAL at 06:09

## 2022-06-06 RX ADMIN — MIDODRINE HYDROCHLORIDE 10 MG: 5 TABLET ORAL at 16:10

## 2022-06-06 RX ADMIN — BUSPIRONE HYDROCHLORIDE 10 MG: 5 TABLET ORAL at 10:03

## 2022-06-06 RX ADMIN — IPRATROPIUM BROMIDE AND ALBUTEROL SULFATE 3 ML: 2.5; .5 SOLUTION RESPIRATORY (INHALATION) at 11:44

## 2022-06-06 RX ADMIN — BUDESONIDE 0.5 MG: 0.5 INHALANT ORAL at 07:40

## 2022-06-06 RX ADMIN — GABAPENTIN 300 MG: 300 CAPSULE ORAL at 16:10

## 2022-06-06 RX ADMIN — FLUTICASONE PROPIONATE 2 SPRAY: 50 SPRAY, METERED NASAL at 10:03

## 2022-06-06 RX ADMIN — METOPROLOL TARTRATE 25 MG: 25 TABLET, FILM COATED ORAL at 10:03

## 2022-06-06 RX ADMIN — GUAIFENESIN 200 MG: 100 SOLUTION ORAL at 17:04

## 2022-06-06 RX ADMIN — GABAPENTIN 300 MG: 300 CAPSULE ORAL at 21:16

## 2022-06-06 RX ADMIN — METOPROLOL TARTRATE 25 MG: 25 TABLET, FILM COATED ORAL at 21:16

## 2022-06-06 RX ADMIN — GUAIFENESIN 200 MG: 100 SOLUTION ORAL at 14:04

## 2022-06-06 RX ADMIN — GUAIFENESIN 200 MG: 100 SOLUTION ORAL at 21:15

## 2022-06-06 RX ADMIN — IPRATROPIUM BROMIDE AND ALBUTEROL SULFATE 3 ML: 2.5; .5 SOLUTION RESPIRATORY (INHALATION) at 19:24

## 2022-06-06 RX ADMIN — PANTOPRAZOLE SODIUM 40 MG: 40 TABLET, DELAYED RELEASE ORAL at 10:03

## 2022-06-06 RX ADMIN — CARBIDOPA AND LEVODOPA 1 TABLET: 25; 100 TABLET ORAL at 21:17

## 2022-06-06 RX ADMIN — MIRTAZAPINE 30 MG: 15 TABLET, FILM COATED ORAL at 21:16

## 2022-06-06 RX ADMIN — AMLODIPINE BESYLATE 10 MG: 5 TABLET ORAL at 10:03

## 2022-06-06 RX ADMIN — CARBIDOPA AND LEVODOPA 1 TABLET: 25; 100 TABLET ORAL at 16:10

## 2022-06-06 RX ADMIN — TAMSULOSIN HYDROCHLORIDE 0.4 MG: 0.4 CAPSULE ORAL at 16:10

## 2022-06-06 RX ADMIN — METHYLPREDNISOLONE SODIUM SUCCINATE 40 MG: 40 INJECTION, POWDER, FOR SOLUTION INTRAMUSCULAR; INTRAVENOUS at 06:09

## 2022-06-06 RX ADMIN — BUSPIRONE HYDROCHLORIDE 10 MG: 5 TABLET ORAL at 16:10

## 2022-06-06 RX ADMIN — IPRATROPIUM BROMIDE AND ALBUTEROL SULFATE 3 ML: 2.5; .5 SOLUTION RESPIRATORY (INHALATION) at 07:40

## 2022-06-06 RX ADMIN — CHOLESTYRAMINE 4 G: 4 POWDER, FOR SUSPENSION ORAL at 10:03

## 2022-06-06 RX ADMIN — GABAPENTIN 300 MG: 300 CAPSULE ORAL at 10:03

## 2022-06-06 RX ADMIN — BUDESONIDE 0.5 MG: 0.5 INHALANT ORAL at 19:24

## 2022-06-06 RX ADMIN — METHYLPREDNISOLONE SODIUM SUCCINATE 40 MG: 40 INJECTION, POWDER, FOR SOLUTION INTRAMUSCULAR; INTRAVENOUS at 14:04

## 2022-06-06 RX ADMIN — IPRATROPIUM BROMIDE AND ALBUTEROL SULFATE 3 ML: 2.5; .5 SOLUTION RESPIRATORY (INHALATION) at 16:35

## 2022-06-06 RX ADMIN — GUAIFENESIN 200 MG: 100 SOLUTION ORAL at 01:59

## 2022-06-06 RX ADMIN — PANTOPRAZOLE SODIUM 40 MG: 40 TABLET, DELAYED RELEASE ORAL at 17:04

## 2022-06-06 RX ADMIN — Medication 3 MG: at 23:31

## 2022-06-06 RX ADMIN — GUAIFENESIN 200 MG: 100 SOLUTION ORAL at 06:09

## 2022-06-06 RX ADMIN — GUAIFENESIN 200 MG: 100 SOLUTION ORAL at 10:03

## 2022-06-06 NOTE — PLAN OF CARE
Problem: Nutrition/Hydration-ADULT  Goal: Nutrient/Hydration intake appropriate for improving, restoring or maintaining nutritional needs  Description: Monitor and assess patient's nutrition/hydration status for malnutrition  Collaborate with interdisciplinary team and initiate plan and interventions as ordered  Monitor patient's weight and dietary intake as ordered or per policy  Utilize nutrition screening tool and intervene as necessary  Determine patient's food preferences and provide high-protein, high-caloric foods as appropriate       INTERVENTIONS:  - Monitor oral intake, urinary output, labs, and treatment plans  - Assess nutrition and hydration status and recommend course of action  - Evaluate amount of meals eaten  - Allow adequate time for meals  - Recommend/ encourage appropriate diets, oral nutritional supplements, and vitamin/mineral supplements  - Assess need for intravenous fluids  - Provide specific nutrition/hydration education as appropriate  - Include patient/family/caregiver in decisions related to nutrition  Outcome: Progressing     Problem: MOBILITY - ADULT  Goal: Maintain or return to baseline ADL function  Description: INTERVENTIONS:  -  Assess patient's ability to carry out ADLs; assess patient's baseline for ADL function and identify physical deficits which impact ability to perform ADLs (bathing, care of mouth/teeth, toileting, grooming, dressing, etc )  - Assess/evaluate cause of self-care deficits   - Assess range of motion  - Assess patient's mobility; develop plan if impaired  - Assess patient's need for assistive devices and provide as appropriate  - Encourage maximum independence but intervene and supervise when necessary  - Involve family in performance of ADLs  - Assess for home care needs following discharge   - Consider OT consult to assist with ADL evaluation and planning for discharge  - Provide patient education as appropriate  Outcome: Progressing  Goal: Maintains/Returns to pre admission functional level  Description: INTERVENTIONS:  - Perform BMAT or MOVE assessment daily    - Set and communicate daily mobility goal to care team and patient/family/caregiver     - Collaborate with rehabilitation services on mobility goals if consulted  - Stand patient 3 times a day  - Ambulate patient 3 times a day  - Out of bed to chair   - Out of bed for meals   - Out of bed for toileting  - Record patient progress and toleration of activity level   Outcome: Progressing     Problem: Potential for Falls  Goal: Patient will remain free of falls  Description: INTERVENTIONS:  - Educate patient/family on patient safety including physical limitations  - Instruct patient to call for assistance with activity   - Consult OT/PT to assist with strengthening/mobility   - Keep Call bell within reach  - Keep bed low and locked with side rails adjusted as appropriate  - Keep care items and personal belongings within reach  - Initiate and maintain comfort rounds  - Make Fall Risk Sign visible to staff  - Offer Toileting every 2 Hours, in advance of need  - Initiate/Maintain bed alarm  - Obtain necessary fall risk management equipment  - Apply yellow socks and bracelet for high fall risk patients  - Consider moving patient to room near nurses station  Outcome: Progressing     Problem: RESPIRATORY - ADULT  Goal: Achieves optimal ventilation and oxygenation  Description: INTERVENTIONS:  - Assess for changes in respiratory status  - Assess for changes in mentation and behavior  - Position to facilitate oxygenation and minimize respiratory effort  - Oxygen administered by appropriate delivery if ordered  - Initiate smoking cessation education as indicated  - Encourage broncho-pulmonary hygiene including cough, deep breathe, Incentive Spirometry  - Assess the need for suctioning and aspirate as needed  - Assess and instruct to report SOB or any respiratory difficulty  - Respiratory Therapy support as indicated  Outcome: Progressing

## 2022-06-06 NOTE — PHYSICAL THERAPY NOTE
PHYSICAL THERAPY EVALUATION/TREATMENT     06/06/22 0935   Note Type   Note type Evaluation   Pain Assessment   Pain Assessment Tool 0-10   Pain Score No Pain   Restrictions/Precautions   Other Precautions Fall Risk;O2   Home Living   Type of [address] One level  (2 TEMI)   Home Equipment Cane;Walker  (3L02)   Prior Function   Level of [place] Independent with ADLs and functional mobility  (ambulates with a cane)   Lives With Alone   Receives Help From Home health  (pt just finished home PT)   ADL Assistance Independent   General   Additional Pertinent History Pt admitted wtih COPD exacerbation  Pt also has a history of Parkinsons Disease   Cognition   Overall Cognitive Status WFL   Following Commands Follows all commands and directions without difficulty   Subjective   Subjective "I feel a little better but I'm still SOB"   RLE Assessment   RLE Assessment WFL   LLE Assessment   LLE Assessment WFL   Bed Mobility   Supine to Sit 4  Minimal assistance   Transfers   Sit to Stand 4  Minimal assistance   Stand to Sit 4  Minimal assistance   Stand pivot 4  Minimal assistance   Additional items Increased time required   Ambulation/Elevation   Gait pattern   (mildly unsteady)   Gait Assistance 4  Minimal assist   Assistive Device Straight cane   Distance 10 feet   Balance   Static Sitting Fair +   Dynamic Sitting Fair   Static Standing Fair   Dynamic Standing Poor   Activity Tolerance   Activity Tolerance Patient tolerated treatment well;Patient limited by fatigue   Assessment   Prognosis Good   Problem List Decreased strength;Decreased endurance; Impaired balance;Decreased mobility   Assessment Patient seen for Physical Therapy evaluation  Patient admitted with COPD with acute exacerbation ([facility] [address] )  Comorbidities affecting patient's physical performance include: palliative care pt, ambulatory dysfunction, peripheral neuropathy, parkinsons disease, COPD    Personal factors affecting patient at time of initial evaluation include: stairs to enter home, inability to navigate level surfaces without external assistance, inability to perform dynamic tasks in community, positive fall history and inability to perform ADLS  Prior to admission, patient was independent with functional mobility with cane and independent with ADLS  Please find objective findings from Physical Therapy assessment regarding body systems outlined above with impairments and limitations including impaired balance, decreased endurance, gait deviations, decreased activity tolerance, decreased functional mobility tolerance and fall risk  The Barthel Index was used as a functional outcome tool presenting with a score of Barthel Index Score: 55 today indicating marked limitations of functional mobility and ADLS  Patient's clinical presentation is currently unstable/unpredictable as seen in patient's presentation of vital sign response, increased fall risk, new onset of impairment of functional mobility and decreased endurance  Pt would benefit from continued Physical Therapy treatment to address deficits as defined above and maximize level of functional mobility  As demonstrated by objective findings, the assigned level of complexity for this evaluation is high  The patient's AM-[facility] Basic Mobility Inpatient Short Form Raw Score is 17  A Raw score of greater than 16 suggests the patient may benefit from discharge to home  Goals   Patient Goals "breathe better"   STG Expiration Date 06/13/22   Short Term Goal #1 independent bed mobility, independent transfers, supervision ambulation with least restrictive device x 50 feet so pt can negotiate his home  LTG Expiration Date 06/20/22   Long Term Goal #1 no falls, pt will ambulate with least restrictive device x 100 feet with a steady gait without SOB   Plan   Treatment/Interventions ADL retraining;Functional transfer training;LE strengthening/ROM; Elevations; Therapeutic exercise; Endurance training;Gait training;Bed mobility; Equipment eval/education;Patient/family training   PT Frequency Other (Comment)  (5w)   Recommendation   PT Discharge Recommendation Home with home health rehabilitation   [address] Mobility Inpatient   Turning in Bed Without Bedrails 3   Lying on Back to Sitting on Edge of Flat Bed 3   Moving Bed to Chair 3   Standing Up From Chair 3   Walk in Room 3   Climb 3-5 Stairs 2   Basic Mobility Inpatient Raw Score 17   Basic Mobility Standardized Score 39 67   Highest Level Of Mobility   -[facility] Goal 5: Stand one or more mins   -[facility] Achieved 7: Walk 25 feet or more   Barthel Index   Feeding 10   Bathing 0   Grooming Score 0   Dressing Score 5   Bladder Score 10   Bowels Score 10   Toilet Use Score 5   Transfers (Bed/Chair) Score 10   Mobility (Level Surface) Score 0   Stairs Score 5   Barthel Index Score 55   Additional Treatment Session   Start Time 0925   End Time 0935   Treatment Assessment S:  "I can walk" O:Min assist supervision to rise from a chair and ambulate with 3l02 x 25 feet  Sp02 93%, /80 after activity A: Pt tolerated activity with moderate fatigue  P:  Continue PT to improve endurance and function  End of Consult   Patient Position at End of Consult All needs within reach; Bedside chair   Licensure   [place] License Number  Alex COLMENARES [id number]

## 2022-06-06 NOTE — ED NOTES
Patient continuously trying to stand and urinate in the urinal  Patient produced 100 ml of urine  Patient bladder scan reads >650ml  Patient refuses straight cath, pt refuses fuentes  Dr Melia Gutierrez notified and Dr Marita Mendez notified  Will continue to monitor        Andrea Garcia RN  06/05/22 2049

## 2022-06-06 NOTE — PLAN OF CARE
Problem: Nutrition/Hydration-ADULT  Goal: Nutrient/Hydration intake appropriate for improving, restoring or maintaining nutritional needs  Description: Monitor and assess patient's nutrition/hydration status for malnutrition  Collaborate with interdisciplinary team and initiate plan and interventions as ordered  Monitor patient's weight and dietary intake as ordered or per policy  Utilize nutrition screening tool and intervene as necessary  Determine patient's food preferences and provide high-protein, high-caloric foods as appropriate       INTERVENTIONS:  - Monitor oral intake, urinary output, labs, and treatment plans  - Assess nutrition and hydration status and recommend course of action  - Evaluate amount of meals eaten  - Assist patient with eating if necessary   - Allow adequate time for meals  - Recommend/ encourage appropriate diets, oral nutritional supplements, and vitamin/mineral supplements  - Order, calculate, and assess calorie counts as needed  - Recommend, monitor, and adjust tube feedings and TPN/PPN based on assessed needs  - Assess need for intravenous fluids  - Provide specific nutrition/hydration education as appropriate  - Include patient/family/caregiver in decisions related to nutrition  Outcome: Progressing     Problem: MOBILITY - ADULT  Goal: Maintain or return to baseline ADL function  Description: INTERVENTIONS:  -  Assess patient's ability to carry out ADLs; assess patient's baseline for ADL function and identify physical deficits which impact ability to perform ADLs (bathing, care of mouth/teeth, toileting, grooming, dressing, etc )  - Assess/evaluate cause of self-care deficits   - Assess range of motion  - Assess patient's mobility; develop plan if impaired  - Assess patient's need for assistive devices and provide as appropriate  - Encourage maximum independence but intervene and supervise when necessary  - Involve family in performance of ADLs  - Assess for home care needs following discharge   - Consider OT consult to assist with ADL evaluation and planning for discharge  - Provide patient education as appropriate  Outcome: Progressing  Goal: Maintains/Returns to pre admission functional level  Description: INTERVENTIONS:  - Perform BMAT or MOVE assessment daily    - Set and communicate daily mobility goal to care team and patient/family/caregiver     - Collaborate with rehabilitation services on mobility goals if consulted  - Stand patient 3 times a day  - Ambulate patient 3 times a day  - Out of bed to chair 2 times a day   - Out of bed for meals 2 times a day  - Out of bed for toileting  - Record patient progress and toleration of activity level   Outcome: Progressing     Problem: Potential for Falls  Goal: Patient will remain free of falls  Description: INTERVENTIONS:  - Educate patient/family on patient safety including physical limitations  - Instruct patient to call for assistance with activity   - Consult OT/PT to assist with strengthening/mobility   - Keep Call bell within reach  - Keep bed low and locked with side rails adjusted as appropriate  - Keep care items and personal belongings within reach  - Initiate and maintain comfort rounds  - Make Fall Risk Sign visible to staff  - Offer Toileting every bed Hours, in advance of need  - Initiate/Maintain bed alarm  - Obtain necessary fall risk management equipment: bed alarm, non slip socks  - Apply yellow socks and bracelet for high fall risk patients  - Consider moving patient to room near nurses station  Outcome: Progressing     Problem: RESPIRATORY - ADULT  Goal: Achieves optimal ventilation and oxygenation  Description: INTERVENTIONS:  - Assess for changes in respiratory status  - Assess for changes in mentation and behavior  - Position to facilitate oxygenation and minimize respiratory effort  - Oxygen administered by appropriate delivery if ordered  - Initiate smoking cessation education as indicated  - Encourage broncho-pulmonary hygiene including cough, deep breathe, Incentive Spirometry  - Assess the need for suctioning and aspirate as needed  - Assess and instruct to report SOB or any respiratory difficulty  - Respiratory Therapy support as indicated  Outcome: Progressing

## 2022-06-06 NOTE — CASE MANAGEMENT
Case Management Assessment & Discharge Planning Note    Patient name [place]  Location [address]/3 3024 Peggy Musa-* MRN [id number]  : 1945 Date 2022       Current Admission Date: 2022  Current Admission Diagnosis:COPD with acute exacerbation [facility])   Patient Active Problem List    Diagnosis Date Noted    H/O cardiac catheterization 2022    Chest heaviness 2022    Hypertensive urgency 2022    Cognitive impairment 2021    Parkinson's disease ([address] ) 2021    Anxiousness 2021    Vitamin D deficiency disease 2021    Avascular necrosis of hip, right ([address] ) 2021    Depression, recurrent ([address] ) 2021    Palliative care patient 11/10/2020    Orthostatic hypotension with spine hypertension 2020    COPD with acute exacerbation ([facility] ) 2019    Ambulatory dysfunction 2019    Insomnia 2019    Chronic venous insufficiency 2019    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 (HCC) 2019    Centrilobular emphysema ([facility] ) 2018    Chronic respiratory failure with hypoxia ([address] ) 2018    Former smoker 2018    Liver disease     Alpha-1-antitrypsin deficiency ([facility] ) 2017    Gastroesophageal reflux disease 2017    Essential hypertension 2017    Acute and chronic respiratory failure with hypoxia ([address] ) 2017    BPH (benign prostatic hyperplasia) 2017    Peripheral neuropathy 2017    Allergic rhinitis 2016    Cataracts, both eyes 2015    Chronic diarrhea  2014    Benign colon polyp 2014      LOS (days): 0  Geometric Mean LOS (GMLOS) (days):   Days to GMLOS:     OBJECTIVE:     Current admission status: Observation     Preferred Pharmacy:   [address]  [place] [address]  Phone: [phone] Fax: [address] [facility] [place]  [place] [address]  Phone: [phone] Fax: [phone]    [address], [address]  [address]  [place] [address]  Phone: [phone] Fax: [phone]    Primary Care Provider: Paula Royal MD    Primary Insurance: MEDICARE  Secondary Insurance: [employer]    ASSESSMENT:  Rik Becerra, [address] - Daughter   Primary Phone: [phone] (Mobile)               Readmission Root Cause  30 Day Readmission: No    Patient Information  Admitted from[de-identified] Home  Mental Status: Alert  During Assessment patient was accompanied by: Not accompanied during assessment  Assessment information provided by[de-identified] Patient  Primary Caregiver: Self  Support Systems: Daughter  [place] of Residence: [address] [place] [address] do you live in?: [place], [address] entry access options  Select all that apply : Stairs  Number of steps to enter home : 1  Type of Current Residence: [place]  In the last 12 months, was there a time when you were not able to pay the mortgage or rent on time?: No  In the last 12 months, how many places have you lived?: 1  In the last 12 months, was there a time when you did not have a steady place to sleep or slept in a shelter (including now)?: No  Homeless/housing insecurity resource given?: N/A  Living Arrangements: Lives Alone    Activities of Daily Living Prior to Admission  Functional Status: Independent  Completes ADLs independently?: Yes  Ambulates independently?: Yes  Does patient use assisted devices?: Yes  Assisted Devices (DME) used: Straight Cane, Home Oxygen concentrator, Portable Oxygen concentrator, Nebulizer, Shower Chair, [address] Name (respiratory supplies):  [employer]  O2 Rate(s): 3LNC  Does patient currently own DME?: Yes  What DME does the patient currently own?: Home Oxygen concentrator, Portable Oxygen concentrator, Kayli Reyes, Straight Cane, Shower Chair, Zo, Rollator  Does patient have a history of Outpatient Therapy (PT/OT)?: Yes  Does the patient have a history of Short-Term Rehab?: Yes (Recent at [facility]; [facility])  Does patient have a history of HHC?: Yes  Does patient currently have Kieran Ambrosio?: Yes    Current Home Health Care  Type of Current Home Care Services: Nurse visit  Current Home Health Agency[de-identified] [facility] Provider[de-identified] PCP    Patient Information Continued  Income Source: Pension/group home  Does patient have prescription coverage?: Yes  Within the past 12 months, you worried that your food would run out before you got the money to buy more : Never true  Within the past 12 months, the food you bought just didn't last and you didn't have money to get more : Never true  Food insecurity resource given?: N/A  Does patient receive dialysis treatments?: No  Does patient have a history of substance abuse?: No  Does patient have a history of Mental Health Diagnosis?: Yes (Mild depression)     Means of Transportation  Means of Transport to Appts[de-identified] Drives Self (Has been an on-going issue as pt continues to refuse to give up his license despite concerns with this cognition   OP CM working on [employer] application)  In the past 12 months, has lack of transportation kept you from medical appointments or from getting medications?: No  In the past 12 months, has lack of transportation kept you from meetings, work, or from getting things needed for daily living?: No  Was application for public transport provided?: N/A    DISCHARGE DETAILS:    Discharge planning discussed with[de-identified] Patient  Freedom of Choice: Yes  Comments - Freedom of Choice: Patient's choice is to continue using [facility] upon discharge from hospital   CM contacted family/caregiver?: No- see comments (Patient declined SW offer to call dtr )  Were Treatment Team discharge recommendations reviewed with patient/caregiver?: Yes  Did patient/caregiver verbalize understanding of patient care needs?: Yes  Were patient/caregiver advised of the risks associated with not following Treatment Team discharge recommendations?: Yes     Requested [address]         Is the patient interested in Kieran Ambrosio at discharge?: Yes  [address] requested[de-identified] Nursing, Physical [address] Name[de-identified] [facility] Provider[de-identified] PCP  Home Health Services Needed[de-identified] Evaluate Functional Status and Safety, Gait/ADL Training, Oxygen Via Nasal Cannula, Strengthening/Theraputic Exercises to Improve Function, COPD Management  Oxygen LPM Ordered (if applicable based on home health services needed):: 3 LPM  Homebound Criteria Met[de-identified] Uses an Assist Device (i e  cane, walker, etc)  Supporting Clincal Findings[de-identified] Requires Oxygen, Fatigues Easliy in [employer], Limited Endurance     Other Referral/Resources/Interventions Provided:  Interventions: C    Would you like to participate in our [address] service program?  : No - Declined    Treatment Team Recommendation: Home with [address]  Discharge Destination Plan[de-identified] Home with Azaelalysa at Discharge : Family    SW met bedside with patient to introduce role, complete assessment, and discuss discharge planning  Patient was last discharged from [facility] on 5/4/22 to [facility]  Patient states he was there for approx  1 week when he was discharged home w/ [facility]  Patient presented back to [facility] on 6/5 and was admitted under observation for COPD exacerbation  Patient confirmed that he is still open with nursing from [facility]  He was discharged from PT/OT services last week  His choice is to resume services with [facility] on discharge  Patient states that things have been going well at home since discharge from rehab  He denies any issues with his home respiratory equipment (concentrator, POC, nebulizer)   He also denies any issues with compliance for his home medications  Last PCP visit was 5/16/22  GAY offered to call his dtr Alisa Christiansen to discuss discharge plan  Patient declined GAY offer stating that she was off work and trying to enjoy the day  He would like GAY to update her tomorrow  GAY reached out to OP CM [employer]  To discuss current discharge needs and OP follow-up  ECIN referral sent to [facility]  GAY will continue to follow

## 2022-06-06 NOTE — PROGRESS NOTES
[facility] [address] [facility] Practice Progress Note - Alysia Giordano 65 y o  male MRN: [id number]    Unit/Bed#: [address] [place] [address] Encounter: [id number]      Assessment/Plan:  * COPD with acute exacerbation [facility])  Assessment & Plan  Acute on chronic  Patient with history of AAT deficiency and centrilobular emphysema presents with worsening sob over the last couple days with no relief of symptoms with use of nebulizer at home  He is on home oxygen baseline 2-3 L  At the ER he received DuoNebs, Solu-Medrol, albuterol  · Chest x-ray results pending  · Solu Medrol 40 mg q 8h  · Continue home Pulmicort  · Continue albuterol q 4h p r n  · DuoNebs 4 times daily  · Continues pulse ox  · Spirometry  · Keep SpO2 above 92%  · Patient receives weekly Zemaira injections for AAT, will order when indicated patient remains in hospital     Parkinson's disease [facility])  Assessment & Plan  Chronic  · Continue carbidopa-levodopa    Depression, recurrent ([facility] )  Assessment & Plan  Chronic, stable  · Continue home medications mirtazapine and BuSpar    Peripheral neuropathy  Assessment & Plan  Chronic, stable  · Continue gabapentin    Ambulatory dysfunction  Assessment & Plan  Chronic  · Fall precautions  · PT/OT    Insomnia  Assessment & Plan  Chronic, stable  · Continue home trazodone and mirtazapine    BPH (benign prostatic hyperplasia)  Assessment & Plan  Chronic, stable  · Continue Flomax and finasteride    Acute and chronic respiratory failure with hypoxia (HCC)  Assessment & Plan  Acute on chronic  Secondary to AAT and centrilobular emphysema with exacerbation of symptoms as evidence by respiratory distress, increased requirement for bronchodilators, and tachypnea  · See plan above      Essential hypertension  Assessment & Plan  Chronic, stable  · Monitor blood pressures  · Continue home medications Lopressor and Norvasc  · Continue midodrine with hold parameters    Gastroesophageal reflux disease  Assessment & Plan  Chronic, stable  · Continue home Protonix 40 mg b i d  Alpha-1-antitrypsin deficiency [facility])  Assessment & Plan  He is on Zemaira once weekly  Continue home medication when indicated  Subjective:   Patient seen at bedside  He reports feeling well now and denies chest pain, difficulty breathing, hematemesis, or melena  He does report generalized abdominal pain and sometimes will see dark blood when he wipes after a bowel movement, generally does not have any pain with bowel movements  Reports his cough is improving  Objective:     Vitals: Blood pressure 138/69, pulse 73, temperature 97 8 °F (36 6 °C), temperature source Axillary, resp  rate 20, height 6' 5" (1 956 m), weight 86 2 kg (190 lb), SpO2 98 %  ,Body mass index is 22 53 kg/m²  Wt Readings from Last 3 Encounters:   06/05/22 86 2 kg (190 lb)   05/25/22 81 9 kg (180 lb 9 6 oz)   05/23/22 82 1 kg (181 lb)       Intake/Output Summary (Last 24 hours) at 6/6/2022 0810  Last data filed at 6/6/2022 0200  Gross per 24 hour   Intake 50 ml   Output 1100 ml   Net -1050 ml       Physical Exam  Vitals reviewed  Constitutional:       General: He is awake  He is not in acute distress  Pulmonary:      Effort: Pulmonary effort is normal       Breath sounds: Normal breath sounds and air entry  No decreased air movement  No decreased breath sounds, wheezing, rhonchi or rales  Abdominal:      General: Abdomen is flat  Bowel sounds are normal       Palpations: Abdomen is soft  Tenderness: There is abdominal tenderness in the epigastric area  There is no guarding  Neurological:      Mental Status: He is alert  Psychiatric:         Behavior: Behavior is cooperative             Recent Results (from the past 24 hour(s))   CBC and differential    Collection Time: 06/05/22  3:03 PM   Result Value Ref Range    WBC 5 91 4 31 - 10 16 Thousand/uL    RBC 4 12 3 88 - 5 62 Million/uL    Hemoglobin 13 2 12 0 - 17 0 g/dL    Hematocrit 38 8 36 5 - 49 3 %    MCV 94 82 - 98 fL    MCH 32 0 26 8 - 34 3 pg    MCHC 34 0 31 4 - 37 4 g/dL    RDW 14 6 11 6 - 15 1 %    MPV 8 9 8 9 - 12 7 fL    Platelets 844 352 - 446 Thousands/uL    nRBC 0 /100 WBCs    Neutrophils Relative 45 43 - 75 %    Immat GRANS % 1 0 - 2 %    Lymphocytes Relative 42 14 - 44 %    Monocytes Relative 9 4 - 12 %    Eosinophils Relative 2 0 - 6 %    Basophils Relative 1 0 - 1 %    Neutrophils Absolute 2 70 1 85 - 7 62 Thousands/µL    Immature Grans Absolute 0 06 0 00 - 0 20 Thousand/uL    Lymphocytes Absolute 2 48 0 60 - 4 47 Thousands/µL    Monocytes Absolute 0 51 0 17 - 1 22 Thousand/µL    Eosinophils Absolute 0 12 0 00 - 0 61 Thousand/µL    Basophils Absolute 0 04 0 00 - 0 10 Thousands/µL   Comprehensive metabolic panel    Collection Time: 06/05/22  3:03 PM   Result Value Ref Range    Sodium 143 136 - 145 mmol/L    Potassium 4 2 3 5 - 5 3 mmol/L    Chloride 106 100 - 108 mmol/L    CO2 28 21 - 32 mmol/L    ANION GAP 9 4 - 13 mmol/L    BUN 15 5 - 25 mg/dL    Creatinine 1 21 0 60 - 1 30 mg/dL    Glucose 136 65 - 140 mg/dL    Calcium 8 8 8 3 - 10 1 mg/dL    AST 27 5 - 45 U/L    ALT 16 12 - 78 U/L    Alkaline Phosphatase 77 46 - 116 U/L    Total Protein 6 7 6 4 - 8 2 g/dL    Albumin 3 7 3 5 - 5 0 g/dL    Total Bilirubin 1 28 (H) 0 20 - 1 00 mg/dL    eGFR 56 ml/min/1 73sq m   HS Troponin 0hr (reflex protocol)    Collection Time: 06/05/22  3:03 PM   Result Value Ref Range    hs TnI 0hr 10 "Refer to ACS Flowchart"- see link ng/L   NT-BNP PRO    Collection Time: 06/05/22  3:03 PM   Result Value Ref Range    NT-proBNP 193 <450 pg/mL   HS Troponin I 2hr    Collection Time: 06/05/22  5:24 PM   Result Value Ref Range    hs TnI 2hr 9 "Refer to ACS Flowchart"- see link ng/L    Delta 2hr hsTnI -1 <20 ng/L   HS Troponin I 4hr    Collection Time: 06/05/22  7:30 PM   Result Value Ref Range    hs TnI 4hr 8 "Refer to ACS Flowchart"- see link ng/L    Delta 4hr hsTnI -2 <20 ng/L   Procalcitonin    Collection Time: 06/05/22  7:30 PM   Result Value Ref Range Procalcitonin 0 08 <=0 25 ng/ml   COVID/FLU/RSV    Collection Time: 06/05/22  7:46 PM    Specimen: Nose; Nares   Result Value Ref Range    SARS-CoV-2 Negative Negative    INFLUENZA A PCR Negative Negative    INFLUENZA B PCR Negative Negative    RSV PCR Negative Negative   Platelet count    Collection Time: 06/05/22  9:00 PM   Result Value Ref Range    Platelets 463 710 - 519 Thousands/uL    MPV 9 4 8 9 - 12 7 fL   Magnesium    Collection Time: 06/06/22  5:09 AM   Result Value Ref Range    Magnesium 2 0 1 6 - 2 6 mg/dL   Basic metabolic panel    Collection Time: 06/06/22  5:09 AM   Result Value Ref Range    Sodium 142 136 - 145 mmol/L    Potassium 4 4 3 5 - 5 3 mmol/L    Chloride 105 100 - 108 mmol/L    CO2 24 21 - 32 mmol/L    ANION GAP 13 4 - 13 mmol/L    BUN 17 5 - 25 mg/dL    Creatinine 1 30 0 60 - 1 30 mg/dL    Glucose 155 (H) 65 - 140 mg/dL    Glucose, Fasting 155 (H) 65 - 99 mg/dL    Calcium 9 0 8 3 - 10 1 mg/dL    eGFR 52 ml/min/1 73sq m   CBC and differential    Collection Time: 06/06/22  5:09 AM   Result Value Ref Range    WBC 7 31 4 31 - 10 16 Thousand/uL    RBC 3 73 (L) 3 88 - 5 62 Million/uL    Hemoglobin 11 8 (L) 12 0 - 17 0 g/dL    Hematocrit 34 7 (L) 36 5 - 49 3 %    MCV 93 82 - 98 fL    MCH 31 6 26 8 - 34 3 pg    MCHC 34 0 31 4 - 37 4 g/dL    RDW 14 6 11 6 - 15 1 %    MPV 9 2 8 9 - 12 7 fL    Platelets 178 994 - 914 Thousands/uL    nRBC 0 /100 WBCs    Neutrophils Relative 77 (H) 43 - 75 %    Immat GRANS % 1 0 - 2 %    Lymphocytes Relative 18 14 - 44 %    Monocytes Relative 4 4 - 12 %    Eosinophils Relative 0 0 - 6 %    Basophils Relative 0 0 - 1 %    Neutrophils Absolute 5 61 1 85 - 7 62 Thousands/µL    Immature Grans Absolute 0 07 0 00 - 0 20 Thousand/uL    Lymphocytes Absolute 1 34 0 60 - 4 47 Thousands/µL    Monocytes Absolute 0 27 0 17 - 1 22 Thousand/µL    Eosinophils Absolute 0 00 0 00 - 0 61 Thousand/µL    Basophils Absolute 0 02 0 00 - 0 10 Thousands/µL       Current Facility-Administered Medications   Medication Dose Route Frequency Provider Last Rate Last Admin    acetaminophen (TYLENOL) tablet 650 mg  650 mg Oral Q6H PRN Ganga Lara MD        amLODIPine [facility]) tablet 10 mg  10 mg Oral Daily Ganga Lara MD        budesonide (PULMICORT) inhalation solution 0 5 mg  0 5 mg Nebulization BID Ganga Lara MD   0 5 mg at 06/06/22 0740    busPIRone (BUSPAR) tablet 10 mg  10 mg Oral TID Ganga Lara MD   10 mg at 06/05/22 2153    carbidopa-levodopa (SINEMET)  mg per tablet 1 tablet  1 tablet Oral TID Ganga Lara MD   1 tablet at 06/05/22 2153    cholestyramine sugar free (QUESTRAN LIGHT) packet 4 g  4 g Oral Daily Ganga Lara MD        enoxaparin (LOVENOX) subcutaneous injection 40 mg  40 mg Subcutaneous Daily Ganga Lara MD        finasteride (PROSCAR) tablet 5 mg  5 mg Oral QAM Ganga Lara MD        fluticasone [facility]) 50 mcg/act nasal spray 2 spray  2 spray Nasal Daily Ganga Lara MD        gabapentin (NEURONTIN) capsule 300 mg  300 mg Oral TID Ganga Lara MD   300 mg at 06/05/22 2153    guaiFENesin (ROBITUSSIN) oral solution 200 mg  200 mg Oral Q4H Von Renteria MD   200 mg at 06/06/22 0609    ipratropium-albuterol (DUO-NEB) 0 5-2 5 mg/3 mL inhalation solution 3 mL  3 mL Nebulization 4x Daily Ganga Lara MD   3 mL at 06/06/22 0740    methylPREDNISolone sodium succinate (Solu-MEDROL) injection 40 mg  40 mg Intravenous Q8H 1900 Michael Diaz MD   40 mg at 06/06/22 0609    metoprolol tartrate (LOPRESSOR) tablet 25 mg  25 mg Oral Q12H Enzo Moyer MD   25 mg at 06/05/22 2153    midodrine (PROAMATINE) tablet 10 mg  10 mg Oral TID  Ganga Lara MD   10 mg at 06/06/22 1518    mirtazapine (REMERON) tablet 30 mg  30 mg Oral  Janet Tejada David Rose MD   30 mg at 06/05/22 2153    pantoprazole (PROTONIX) EC tablet 40 mg  40 mg Oral BID Alena Hdz MD   40 mg at 06/05/22 2153    tamsulosin (FLOMAX) capsule 0 4 mg  0 4 mg Oral Daily With [employer], DO   0 4 mg at 06/05/22 2153       Invasive Devices  Report    Peripheral Intravenous Line  Duration           Peripheral IV 06/05/22 Dorsal (posterior); Right Wrist <1 day    Peripheral IV 06/05/22 Right Antecubital <1 day                Lab, Imaging and other studies: I have personally reviewed pertinent reports      VTE Pharmacologic Prophylaxis: Enoxaparin (Lovenox)  VTE Mechanical Prophylaxis: sequential compression device    Alena Hdz MD

## 2022-06-06 NOTE — RESPIRATORY THERAPY NOTE
RT Protocol Note  Kankakeeabiodun Riverauty 65 y o  male MRN: [id number]  Unit/Bed#: [address] [place] [address] Encounter: [id number]    Assessment    Principal Problem:    COPD with acute exacerbation ([place] [address] )  Active Problems:    Alpha-1-antitrypsin deficiency ([place] [address] )    Gastroesophageal reflux disease    Essential hypertension    Acute and chronic respiratory failure with hypoxia ([facility])    BPH (benign prostatic hyperplasia)    Insomnia    Ambulatory dysfunction    Peripheral neuropathy    Depression, recurrent (HCC)    Parkinson's disease ([place] [address] )      Home Pulmonary Medications:  Duoneb & Pulmicort nebs  Home Devices/Therapy: Home O2    Past Medical History:   Diagnosis Date    Anesthesia complication     Difficult to wake up    Arthritis     BPH (benign prostatic hyperplasia)     urinary frequency    Cancer ([facility])     basal cell neck, face    COPD (chronic obstructive pulmonary disease) ([facility])     COPD exacerbation ([place] [address] ) 2019    Elevated PSA 10/25/2018    Full dentures     Hiatal hernia     History of methicillin resistant staphylococcus aureus (MRSA)     10/11/2018 MRSA (nares) positive    Hypertension     Irritable bowel syndrome     Kidney stone     at least 7 episodes    Liver disease     Alpha 1- enzyme deficiency - diagnosed   has been on weekly replacement therapy since then    Pulmonary emphysema ([place] [address] )     1/25/15  FEV1 - 2 45 liters or 59% of predicted    RSV infection 2017    Wears glasses     for driving only     Social History     Socioeconomic History    Marital status:       Spouse name: None    Number of children: None    Years of education: None    Highest education level: None   Occupational History    None   Tobacco Use    Smoking status: Former Smoker     Packs/day: 1 00     Years: 60 00     Pack years: 60 00     Quit date: 2017     Years since quittin 7    Smokeless tobacco: Never Used    Tobacco comment: quit in 2017   Vaping Use    Vaping Use: Never used Substance and Sexual Activity    Alcohol use: Never     Comment: stopped in 2009    Drug use: Not Currently    Sexual activity: None   Other Topics Concern    None   Social History Narrative    Patient is   He has three adult children; 1 daughter and 2 sons  His daughter Bg Medrano lives closest [place] and is very involved w/ his care  Patient is not Yarsani  He lives alone  Social Determinants of Health     Financial Resource Strain: Not on file   Food Insecurity: No Food Insecurity    Worried About Running Out of Food in the Last Year: Never true    Vi of Food in the Last Year: Never true   Transportation Needs: No Transportation Needs    Lack of Transportation (Medical): No    Lack of Transportation (Non-Medical): No   Physical Activity: Not on file   Stress: Not on file   Social Connections: Not on file   Intimate Partner Violence: Not on file   Housing Stability: Low Risk     Unable to Pay for Housing in the Last Year: No    Number of Places Lived in the Last Year: 1    Unstable Housing in the Last Year: No       Subjective         Objective    Physical Exam:   Assessment Type: Pre-treatment  General Appearance: Alert, Awake  Respiratory Pattern: Dyspnea with exertion, Dyspnea at rest  Chest Assessment: Chest expansion symmetrical  Bilateral Breath Sounds: Diminished, Expiratory wheezes  Cough: Non-productive, Strong, Barky  O2 Device: NC    Vitals:  Blood pressure 165/81, pulse 99, temperature 97 8 °F (36 6 °C), temperature source Oral, resp  rate (!) 23, height 6' 5" (1 956 m), weight 86 2 kg (190 lb), SpO2 97 %                O2 Device: NC     Plan    Respiratory Plan: Home Bronchodilator Patient pathway

## 2022-06-06 NOTE — QUICK NOTE
I just checked on patient due to the nurses concern for urinary retention  Nurse reports that he is only urinating  mL at a time and recent bladder scan read greater than 650  When I saw patient, patient had just urinated approximately 500 mL into a urinal   Nurse plans to rescanned him now per urinary retention protocol  Patient's orders were released so he could get his finasteride and Flomax which he takes at home for BPH

## 2022-06-07 LAB
ANION GAP SERPL CALCULATED.3IONS-SCNC: 11 MMOL/L (ref 4–13)
BASOPHILS # BLD AUTO: 0.01 THOUSANDS/ΜL (ref 0–0.1)
BASOPHILS NFR BLD AUTO: 0 % (ref 0–1)
BUN SERPL-MCNC: 28 MG/DL (ref 5–25)
CALCIUM SERPL-MCNC: 8.7 MG/DL (ref 8.3–10.1)
CHLORIDE SERPL-SCNC: 104 MMOL/L (ref 100–108)
CO2 SERPL-SCNC: 26 MMOL/L (ref 21–32)
CREAT SERPL-MCNC: 1.17 MG/DL (ref 0.6–1.3)
EOSINOPHIL # BLD AUTO: 0 THOUSAND/ΜL (ref 0–0.61)
EOSINOPHIL NFR BLD AUTO: 0 % (ref 0–6)
ERYTHROCYTE [DISTWIDTH] IN BLOOD BY AUTOMATED COUNT: 14.6 % (ref 11.6–15.1)
GFR SERPL CREATININE-BSD FRML MDRD: 59 ML/MIN/1.73SQ M
GLUCOSE SERPL-MCNC: 129 MG/DL (ref 65–140)
HCT VFR BLD AUTO: 33.6 % (ref 36.5–49.3)
HGB BLD-MCNC: 11.4 G/DL (ref 12–17)
IMM GRANULOCYTES # BLD AUTO: 0.16 THOUSAND/UL (ref 0–0.2)
IMM GRANULOCYTES NFR BLD AUTO: 2 % (ref 0–2)
LYMPHOCYTES # BLD AUTO: 1.87 THOUSANDS/ΜL (ref 0.6–4.47)
LYMPHOCYTES NFR BLD AUTO: 19 % (ref 14–44)
MCH RBC QN AUTO: 31.8 PG (ref 26.8–34.3)
MCHC RBC AUTO-ENTMCNC: 33.9 G/DL (ref 31.4–37.4)
MCV RBC AUTO: 94 FL (ref 82–98)
MONOCYTES # BLD AUTO: 0.48 THOUSAND/ΜL (ref 0.17–1.22)
MONOCYTES NFR BLD AUTO: 5 % (ref 4–12)
NEUTROPHILS # BLD AUTO: 7.55 THOUSANDS/ΜL (ref 1.85–7.62)
NEUTS SEG NFR BLD AUTO: 74 % (ref 43–75)
NRBC BLD AUTO-RTO: 0 /100 WBCS
PLATELET # BLD AUTO: 160 THOUSANDS/UL (ref 149–390)
PMV BLD AUTO: 9.3 FL (ref 8.9–12.7)
POTASSIUM SERPL-SCNC: 4.7 MMOL/L (ref 3.5–5.3)
PROCALCITONIN SERPL-MCNC: 0.07 NG/ML
RBC # BLD AUTO: 3.59 MILLION/UL (ref 3.88–5.62)
SODIUM SERPL-SCNC: 141 MMOL/L (ref 136–145)
WBC # BLD AUTO: 10.07 THOUSAND/UL (ref 4.31–10.16)

## 2022-06-07 PROCEDURE — 80048 BASIC METABOLIC PNL TOTAL CA: CPT

## 2022-06-07 PROCEDURE — 99232 SBSQ HOSP IP/OBS MODERATE 35: CPT | Performed by: INTERNAL MEDICINE

## 2022-06-07 PROCEDURE — 85025 COMPLETE CBC W/AUTO DIFF WBC: CPT

## 2022-06-07 PROCEDURE — 84145 PROCALCITONIN (PCT): CPT | Performed by: STUDENT IN AN ORGANIZED HEALTH CARE EDUCATION/TRAINING PROGRAM

## 2022-06-07 PROCEDURE — 97535 SELF CARE MNGMENT TRAINING: CPT

## 2022-06-07 PROCEDURE — 97167 OT EVAL HIGH COMPLEX 60 MIN: CPT

## 2022-06-07 PROCEDURE — 99232 SBSQ HOSP IP/OBS MODERATE 35: CPT | Performed by: FAMILY MEDICINE

## 2022-06-07 PROCEDURE — 94760 N-INVAS EAR/PLS OXIMETRY 1: CPT

## 2022-06-07 PROCEDURE — 94640 AIRWAY INHALATION TREATMENT: CPT

## 2022-06-07 RX ORDER — ONDANSETRON 4 MG/1
4 TABLET, ORALLY DISINTEGRATING ORAL EVERY 6 HOURS PRN
Status: DISCONTINUED | OUTPATIENT
Start: 2022-06-07 | End: 2022-06-09 | Stop reason: HOSPADM

## 2022-06-07 RX ORDER — METHYLPREDNISOLONE SODIUM SUCCINATE 40 MG/ML
20 INJECTION, POWDER, LYOPHILIZED, FOR SOLUTION INTRAMUSCULAR; INTRAVENOUS EVERY 8 HOURS SCHEDULED
Status: DISCONTINUED | OUTPATIENT
Start: 2022-06-07 | End: 2022-06-09 | Stop reason: HOSPADM

## 2022-06-07 RX ORDER — IPRATROPIUM BROMIDE AND ALBUTEROL SULFATE 2.5; .5 MG/3ML; MG/3ML
3 SOLUTION RESPIRATORY (INHALATION) EVERY 2 HOUR PRN
Status: DISCONTINUED | OUTPATIENT
Start: 2022-06-07 | End: 2022-06-09 | Stop reason: HOSPADM

## 2022-06-07 RX ADMIN — METOPROLOL TARTRATE 25 MG: 25 TABLET, FILM COATED ORAL at 09:32

## 2022-06-07 RX ADMIN — GUAIFENESIN 200 MG: 100 SOLUTION ORAL at 01:42

## 2022-06-07 RX ADMIN — BUDESONIDE 0.5 MG: 0.5 INHALANT ORAL at 19:54

## 2022-06-07 RX ADMIN — FLUTICASONE PROPIONATE 2 SPRAY: 50 SPRAY, METERED NASAL at 09:31

## 2022-06-07 RX ADMIN — MIDODRINE HYDROCHLORIDE 10 MG: 5 TABLET ORAL at 17:07

## 2022-06-07 RX ADMIN — CHOLESTYRAMINE 4 G: 4 POWDER, FOR SUSPENSION ORAL at 09:33

## 2022-06-07 RX ADMIN — ENOXAPARIN SODIUM 40 MG: 40 INJECTION SUBCUTANEOUS at 09:33

## 2022-06-07 RX ADMIN — GUAIFENESIN 200 MG: 100 SOLUTION ORAL at 21:04

## 2022-06-07 RX ADMIN — GUAIFENESIN 200 MG: 100 SOLUTION ORAL at 09:31

## 2022-06-07 RX ADMIN — ONDANSETRON 4 MG: 4 TABLET, ORALLY DISINTEGRATING ORAL at 14:02

## 2022-06-07 RX ADMIN — IPRATROPIUM BROMIDE AND ALBUTEROL SULFATE 3 ML: 2.5; .5 SOLUTION RESPIRATORY (INHALATION) at 07:12

## 2022-06-07 RX ADMIN — FINASTERIDE 5 MG: 5 TABLET, FILM COATED ORAL at 09:32

## 2022-06-07 RX ADMIN — GABAPENTIN 300 MG: 300 CAPSULE ORAL at 17:07

## 2022-06-07 RX ADMIN — IPRATROPIUM BROMIDE AND ALBUTEROL SULFATE 3 ML: 2.5; .5 SOLUTION RESPIRATORY (INHALATION) at 11:09

## 2022-06-07 RX ADMIN — METOPROLOL TARTRATE 25 MG: 25 TABLET, FILM COATED ORAL at 21:03

## 2022-06-07 RX ADMIN — PANTOPRAZOLE SODIUM 40 MG: 40 TABLET, DELAYED RELEASE ORAL at 09:32

## 2022-06-07 RX ADMIN — CARBIDOPA AND LEVODOPA 1 TABLET: 25; 100 TABLET ORAL at 17:07

## 2022-06-07 RX ADMIN — Medication 3 MG: at 21:03

## 2022-06-07 RX ADMIN — TAMSULOSIN HYDROCHLORIDE 0.4 MG: 0.4 CAPSULE ORAL at 17:08

## 2022-06-07 RX ADMIN — AMLODIPINE BESYLATE 10 MG: 5 TABLET ORAL at 09:32

## 2022-06-07 RX ADMIN — MIRTAZAPINE 30 MG: 15 TABLET, FILM COATED ORAL at 21:03

## 2022-06-07 RX ADMIN — MIDODRINE HYDROCHLORIDE 10 MG: 5 TABLET ORAL at 06:01

## 2022-06-07 RX ADMIN — METHYLPREDNISOLONE SODIUM SUCCINATE 20 MG: 40 INJECTION, POWDER, FOR SOLUTION INTRAMUSCULAR; INTRAVENOUS at 21:02

## 2022-06-07 RX ADMIN — GABAPENTIN 300 MG: 300 CAPSULE ORAL at 09:32

## 2022-06-07 RX ADMIN — GUAIFENESIN 200 MG: 100 SOLUTION ORAL at 06:01

## 2022-06-07 RX ADMIN — BUDESONIDE 0.5 MG: 0.5 INHALANT ORAL at 07:12

## 2022-06-07 RX ADMIN — BUSPIRONE HYDROCHLORIDE 10 MG: 5 TABLET ORAL at 17:07

## 2022-06-07 RX ADMIN — BUSPIRONE HYDROCHLORIDE 10 MG: 5 TABLET ORAL at 09:32

## 2022-06-07 RX ADMIN — MIDODRINE HYDROCHLORIDE 10 MG: 5 TABLET ORAL at 11:43

## 2022-06-07 RX ADMIN — GABAPENTIN 300 MG: 300 CAPSULE ORAL at 21:04

## 2022-06-07 RX ADMIN — METHYLPREDNISOLONE SODIUM SUCCINATE 40 MG: 40 INJECTION, POWDER, FOR SOLUTION INTRAMUSCULAR; INTRAVENOUS at 13:43

## 2022-06-07 RX ADMIN — IPRATROPIUM BROMIDE AND ALBUTEROL SULFATE 3 ML: 2.5; .5 SOLUTION RESPIRATORY (INHALATION) at 14:07

## 2022-06-07 RX ADMIN — PANTOPRAZOLE SODIUM 40 MG: 40 TABLET, DELAYED RELEASE ORAL at 17:08

## 2022-06-07 RX ADMIN — METHYLPREDNISOLONE SODIUM SUCCINATE 40 MG: 40 INJECTION, POWDER, FOR SOLUTION INTRAMUSCULAR; INTRAVENOUS at 06:01

## 2022-06-07 RX ADMIN — CARBIDOPA AND LEVODOPA 1 TABLET: 25; 100 TABLET ORAL at 21:03

## 2022-06-07 RX ADMIN — IPRATROPIUM BROMIDE AND ALBUTEROL SULFATE 3 ML: 2.5; .5 SOLUTION RESPIRATORY (INHALATION) at 19:54

## 2022-06-07 RX ADMIN — GUAIFENESIN 200 MG: 100 SOLUTION ORAL at 13:43

## 2022-06-07 RX ADMIN — CARBIDOPA AND LEVODOPA 1 TABLET: 25; 100 TABLET ORAL at 09:32

## 2022-06-07 RX ADMIN — BUSPIRONE HYDROCHLORIDE 10 MG: 5 TABLET ORAL at 21:03

## 2022-06-07 RX ADMIN — GUAIFENESIN 200 MG: 100 SOLUTION ORAL at 17:07

## 2022-06-07 NOTE — PLAN OF CARE
Problem: Nutrition/Hydration-ADULT  Goal: Nutrient/Hydration intake appropriate for improving, restoring or maintaining nutritional needs  Description: Monitor and assess patient's nutrition/hydration status for malnutrition  Collaborate with interdisciplinary team and initiate plan and interventions as ordered  Monitor patient's weight and dietary intake as ordered or per policy  Utilize nutrition screening tool and intervene as necessary  Determine patient's food preferences and provide high-protein, high-caloric foods as appropriate       INTERVENTIONS:  - Monitor oral intake, urinary output, labs, and treatment plans  - Assess nutrition and hydration status and recommend course of action  - Evaluate amount of meals eaten  - Assist patient with eating if necessary   - Allow adequate time for meals  - Recommend/ encourage appropriate diets, oral nutritional supplements, and vitamin/mineral supplements  - Order, calculate, and assess calorie counts as needed  - Assess need for intravenous fluids  - Provide specific nutrition/hydration education as appropriate  - Include patient/family/caregiver in decisions related to nutrition  Outcome: Progressing     Problem: MOBILITY - ADULT  Goal: Maintain or return to baseline ADL function  Description: INTERVENTIONS:  -  Assess patient's ability to carry out ADLs; assess patient's baseline for ADL function and identify physical deficits which impact ability to perform ADLs (bathing, care of mouth/teeth, toileting, grooming, dressing, etc )  - Assess/evaluate cause of self-care deficits   - Assess range of motion  - Assess patient's mobility; develop plan if impaired  - Assess patient's need for assistive devices and provide as appropriate  - Encourage maximum independence but intervene and supervise when necessary  - Involve family in performance of ADLs  - Assess for home care needs following discharge   - Consider OT consult to assist with ADL evaluation and planning for discharge  - Provide patient education as appropriate  Outcome: Progressing     Problem: Potential for Falls  Goal: Patient will remain free of falls  Description: INTERVENTIONS:  - Educate patient/family on patient safety including physical limitations  - Instruct patient to call for assistance with activity   - Consult OT/PT to assist with strengthening/mobility   - Keep Call bell within reach  - Keep bed low and locked with side rails adjusted as appropriate  - Keep care items and personal belongings within reach  - Initiate and maintain comfort rounds  - Make Fall Risk Sign visible to staff  - Offer Toileting every 2 Hours, in advance of need  - Initiate/Maintain bed alarm  - Apply yellow socks and bracelet for high fall risk patients  - Consider moving patient to room near nurses station  Outcome: Progressing     Problem: RESPIRATORY - ADULT  Goal: Achieves optimal ventilation and oxygenation  Description: INTERVENTIONS:  - Assess for changes in respiratory status  - Assess for changes in mentation and behavior  - Position to facilitate oxygenation and minimize respiratory effort  - Oxygen administered by appropriate delivery if ordered  - Initiate smoking cessation education as indicated  - Encourage broncho-pulmonary hygiene including cough, deep breathe, Incentive Spirometry  - Assess the need for suctioning and aspirate as needed  - Assess and instruct to report SOB or any respiratory difficulty  - Respiratory Therapy support as indicated  Outcome: Progressing

## 2022-06-07 NOTE — PROGRESS NOTES
Consultation - Pulmonary Medicine   Cameron Cali 65 y o  male MRN: [id number]  Unit/Bed#: [address] [place] [address] Encounter: [id number]      Assessment:  Acute exacerbation of severe COPD  Severe COPD with FEV1 of 1 59 L or 40% of predicted  Parkinson's disease  Patient is on Sinemet  Alpha-1 antitrypsin deficiency for which patient is on weekly replacement therapy with alpha-1 proteinase inhibitor  Chronic hypoxemic respiratory failure  Usually on 3 L of oxygen      Plan:   Concur with IV Solu-Medrol 40 mg every 8 hours  Patient not have any mucus production was cough but will place order for sputum culture  Cough is not improving can consider antibiotic therapy with IV ceftriaxone or p o  Ceftin        History of Present Illness   Physician Requesting Consult: Becky Fisher MD  Reason for Consult / Principal Problem: Acute exacerbation of COPD  Hx and PE limited by: none    HPI: Cameron Cali is a 65y o  year old male who presented to the emergency room yesterday afternoon complaining worsening shortness of breath over last few days  He had just recent completed a prednisone taper for COPD exacerbation  He has started with a new nonproductive cough  Not having any fever or chills  He was given our long treatment with albuterol or in a m  Judy Serum Also received dose of Solu-Medrol 25 mg  Nasal swab for COVID, RSV and influenza was negative  Portable chest x-ray done did not show any infiltrates  He was started on methylprednisone 40 mg IV every 8 hours and is on neb treatments with DuoNeb q i d  patient feels slightly better today but still has cough  He does have chronic hypoxemic respiratory and is on 3 L of oxygen at home increase up to 5 liters/minutes with activity  He does have history of alpha-1 antitrypsin deficiency and is on weekly infusion of alpha-1 proteinase inhibitor he has been on this for several years    Last spirometry done January 2020 showed FEV1 of 1 59 L or 40% predicted        Review of Systems   Constitutional: Negative for chills, fever and unexpected weight change  HENT: Negative for congestion, rhinorrhea and sore throat  Eyes: Negative for discharge and redness  Respiratory: Positive for cough and shortness of breath  Cardiovascular: Negative for chest pain, palpitations and leg swelling  Gastrointestinal: Negative for abdominal distention, abdominal pain and nausea  Endocrine: Negative for polydipsia and polyphagia  Genitourinary: Negative for dysuria  Musculoskeletal: Negative for joint swelling and myalgias  Skin: Negative for rash  Neurological: Negative for light-headedness  Psychiatric/Behavioral: Negative for decreased concentration  Historical Information   Past Medical History:   Diagnosis Date    Anesthesia complication     Difficult to wake up    Arthritis     BPH (benign prostatic hyperplasia)     urinary frequency    Cancer (HCC)     basal cell neck, face    COPD (chronic obstructive pulmonary disease) (HCC)     COPD exacerbation (HCC) 12/29/2019    Elevated PSA 10/25/2018    Full dentures     Hiatal hernia     History of methicillin resistant staphylococcus aureus (MRSA)     10/11/2018 MRSA (nares) positive    Hypertension     Irritable bowel syndrome     Kidney stone     at least 7 episodes    Liver disease     Alpha 1- enzyme deficiency - diagnosed 2002  has been on weekly replacement therapy since then    Pulmonary emphysema ([facility] [address] )     1/25/15  FEV1 - 2 45 liters or 59% of predicted    RSV infection 12/2017    Wears glasses     for driving only     Past Surgical History:   Procedure Laterality Date    BACK SURGERY  2008    discectomy    CARDIAC CATHETERIZATION N/A 5/3/2022    Procedure: Cardiac catheterization both left and right heart catheterization with vaso dilatory trial;  Surgeon: Gay Alvarado MD;  Location: [place] [address] CATH LAB;   Service: Cardiology    CARDIAC CATHETERIZATION Left 5/3/2022    Procedure: Cardiac Left Heart Cath;  Surgeon: Emanuel Evans MD;  Location: [place] [address] CATH LAB; Service: Cardiology    CARDIAC CATHETERIZATION Left 5/3/2022    Procedure: Cardiac Left Ventriculogram;  Surgeon: Emanuel Evans MD;  Location: [place] [address] CATH LAB; Service: Cardiology    COLONOSCOPY      COLONOSCOPY N/A 3/10/2017    Procedure: Aminta Lot;  Surgeon: aTtyana Pulido MD;  Location: [place] [address] GI LAB; Service:    Sundeepa Jeannette      removal of kidney stones    ESOPHAGOGASTRODUODENOSCOPY N/A 3/10/2017    Procedure: ESOPHAGOGASTRODUODENOSCOPY (EGD); Surgeon: Tatyana Pulido MD;  Location: [facility] GI LAB; Service:     LITHOTRIPSY      IN ESOPHAGOGASTRODUODENOSCOPY TRANSORAL DIAGNOSTIC N/A 2018    Procedure: ESOPHAGOGASTRODUODENOSCOPY (EGD); Surgeon: Tatyana Pulido MD;  Location: [facility] GI LAB;   Service: Gastroenterology    TONSILLECTOMY      VEIN LIGATION AND STRIPPING Bilateral          Social History   Social History     Substance and Sexual Activity   Alcohol Use Never    Comment: stopped in      Social History     Substance and Sexual Activity   Drug Use Not Currently     Social History     Tobacco Use   Smoking Status Former Smoker    Packs/day: 1 00    Years: 60 00    Pack years: 60 00    Quit date: 2017    Years since quittin 7   Smokeless Tobacco Never Used   Tobacco Comment    quit in 2017         Family History:   Family History   Problem Relation Age of Onset    Emphysema Mother         never smoked    Emphysema Father     Cancer Brother         colon    Colon cancer Brother     Ulcerative colitis Family     Liver disease Family        Meds/Allergies     Current Facility-Administered Medications:     acetaminophen (TYLENOL) tablet 650 mg, 650 mg, Oral, Q6H PRN, Mary Lou Faulkner MD    amLODIPine (NORVASC) tablet 10 mg, 10 mg, Oral, Daily, Mary Lou Faulkner MD, 10 mg at 22 1003    budesonide (PULMICORT) inhalation solution 0 5 mg, 0 5 mg, Nebulization, BID, Chantal Chavez MD, 0 5 mg at 06/06/22 1924    busPIRone (BUSPAR) tablet 10 mg, 10 mg, Oral, TID, Chantal Chavez MD, 10 mg at 06/06/22 2116    carbidopa-levodopa (SINEMET)  mg per tablet 1 tablet, 1 tablet, Oral, TID, Chantal Chavez MD, 1 tablet at 06/06/22 2117    cholestyramine sugar free (QUESTRAN LIGHT) packet 4 g, 4 g, Oral, Daily, Chantal Chavez MD, 4 g at 06/06/22 1003    enoxaparin (LOVENOX) subcutaneous injection 40 mg, 40 mg, Subcutaneous, Daily, Chantal Chavez MD, 40 mg at 06/06/22 1003    finasteride (PROSCAR) tablet 5 mg, 5 mg, Oral, QAM, Chantal Chavez MD, 5 mg at 06/06/22 1003    fluticasone (FLONASE) 50 mcg/act nasal spray 2 spray, 2 spray, Nasal, Daily, Chantal Chavez MD, 2 spray at 06/06/22 1003    gabapentin (NEURONTIN) capsule 300 mg, 300 mg, Oral, TID, Chantal Chavez MD, 300 mg at 06/06/22 2116    guaiFENesin (ROBITUSSIN) oral solution 200 mg, 200 mg, Oral, Q4H, [address], MD, 200 mg at 06/06/22 2115    ipratropium-albuterol (DUO-NEB) 0 5-2 5 mg/3 mL inhalation solution 3 mL, 3 mL, Nebulization, 4x Daily, Chantal Chavez MD, 3 mL at 06/06/22 1924    methylPREDNISolone sodium succinate (Solu-MEDROL) injection 40 mg, 40 mg, Intravenous, Q8H [facility], [address] MD, 40 mg at 06/06/22 2115    metoprolol tartrate (LOPRESSOR) tablet 25 mg, 25 mg, Oral, Q12H [facility], Chantal Chavez MD, 25 mg at 06/06/22 2116    midodrine (PROAMATINE) tablet 10 mg, 10 mg, Oral, TID AC, Chantal Chavez MD, 10 mg at 06/06/22 1610    mirtazapine (REMERON) tablet 30 mg, 30 mg, Oral, HS, Chantal Chavez MD, 30 mg at 06/06/22 2116    pantoprazole (PROTONIX) EC tablet 40 mg, 40 mg, Oral, BID, Chantal Chavez MD, 40 mg at 06/06/22 1704    tamsulosin (FLOMAX) capsule 0 4 mg, 0 4 mg, Oral, Daily With Kirk Wilburn DO, 0 4 mg at 06/06/22 1610    Prior to Admission medications    Medication Sig Start Date End Date Taking? Authorizing Provider   amLODIPine (NORVASC) 10 mg tablet TAKE ONE TABLET BY MOUTH DAILY  11/5/21  Yes John Carroll MD   budesonide (PULMICORT) 0 5 mg/2 mL nebulizer solution Take 2 mL (0 5 mg total) by nebulization in the morning and 2 mL (0 5 mg total) in the evening  5/11/22 8/9/22 Yes TOPHER Mosley   busPIRone (BUSPAR) 10 mg tablet TAKE ONE TABLET BY MOUTH THREE TIMES DAILY 5/16/22  Yes Messi Contreras MD   carbidopa-levodopa (SINEMET)  mg per tablet Take 1 tablet by mouth in the morning and 1 tablet in the evening and 1 tablet before bedtime   5/11/22  Yes TOPHER Mosley   cholestyramine (QUESTRAN) 4 g packet Take 1 packet (4 g total) by mouth in the morning  5/11/22  Yes TOPHER Mosley   finasteride (PROSCAR) 5 mg tablet TAKE ONE TABLET BY MOUTH IN THE MORNING  10/21/21  Yes John Carroll MD   fluticasone (FLONASE) 50 mcg/act nasal spray 2 sprays into each nostril daily 7/6/20  Yes Rl Henning MD   gabapentin (NEURONTIN) 300 mg capsule TAKE 1 CAPSULE BY MOUTH 3 TIMES DAILY 5/13/22  Yes Messi Contreras MD   metoprolol tartrate (LOPRESSOR) 25 mg tablet Take 1 tablet (25 mg total) by mouth every 12 (twelve) hours 5/11/22  Yes TOPHER Mosley   mirtazapine (REMERON) 15 mg tablet Take 2 tablets (30 mg total) by mouth daily at bedtime 5/23/22  Yes dOell Fontanez DO   pantoprazole (PROTONIX) 40 mg tablet TAKE ONE TABLET BY MOUTH TWICE DAILY 6/1/22  Yes Messi Contreras MD   tamsulosin (FLOMAX) 0 4 mg TAKE ONE CAPSULE BY MOUTH ONE TIME DAILY 5/12/22  Yes Messi Contreras MD   Diclofenac Sodium (VOLTAREN) 1 % Apply 2 g topically 4 (four) times a day for 7 days Apply to R knee  Patient not taking: Reported on 5/23/2022 12/3/21 4/1/22  TOPHER Robertson   ipratropium-albuterol (DUO-NEB) 0 5-2 5 mg/3 mL nebulizer solution Take 3 mL by nebulization 4 (four) times a day 8/11/21   TOPHER Young   midodrine (PROAMATINE) 10 MG tablet Take 1 tab three times daily  Please hold medication if blood pressure is above 536 systolic  2/14/22   Pako Esqueda MD   OXYGEN-HELIUM IN Inhale 2L at rest- 3L with activity    Historical Provider, MD   Ventolin  (90 Base) MCG/ACT inhaler Inhale 2 puffs every 4 (four) hours as needed for wheezing 11/8/21   Ivone Alvarez PA-C   ZEMAIRA infusion once a week  12/26/19   Historical Provider, MD       Allergies   Allergen Reactions    Chantix [Varenicline]      Caused prostate infection       Objective   Vitals: Blood pressure 118/62, pulse 78, temperature 98 5 °F (36 9 °C), temperature source Oral, resp  rate 20, height 6' 5" (1 956 m), weight 86 2 kg (190 lb), SpO2 95 %  ,Body mass index is 22 53 kg/m²  Intake/Output Summary (Last 24 hours) at 6/6/2022 2155  Last data filed at 6/6/2022 0800  Gross per 24 hour   Intake 300 ml   Output 750 ml   Net -450 ml     Invasive Devices  Report    Peripheral Intravenous Line  Duration           Peripheral IV 06/05/22 Right Antecubital 1 day                Physical Exam  Vitals reviewed  Constitutional:       General: He is not in acute distress  Appearance: He is well-developed  Comments: On 3 liters/minutes nasal cannula oxygen O2 saturation 97%   HENT:      Head: Normocephalic  Right Ear: External ear normal       Left Ear: External ear normal       Nose: Nose normal       Mouth/Throat:      Mouth: Mucous membranes are moist       Pharynx: Oropharynx is clear  No oropharyngeal exudate  Eyes:      Conjunctiva/sclera: Conjunctivae normal       Pupils: Pupils are equal, round, and reactive to light  Neck:      Vascular: No JVD  Trachea: No tracheal deviation  Cardiovascular:      Rate and Rhythm: Normal rate and regular rhythm  Heart sounds: Normal heart sounds     Pulmonary:      Effort: Pulmonary effort is normal       Comments: Lung sounds are decreased with few faint expiratory wheezes bilaterally  Abdominal:      General: There is no distension  Palpations: Abdomen is soft  Tenderness: There is no abdominal tenderness  There is no guarding  Musculoskeletal:      Cervical back: Neck supple  Comments: No edema, cyanosis, or clubbing   Lymphadenopathy:      Cervical: No cervical adenopathy  Skin:     General: Skin is warm and dry  Findings: No rash  Neurological:      Mental Status: He is alert and oriented to person, place, and time  Psychiatric:         Behavior: Behavior normal          Thought Content: Thought content normal          Lab Results: ABG:   Lab Results   Component Value Date    PHART 7 430 04/28/2022    HKB4RKP 41 4 04/28/2022    PO2ART 171 2 (H) 04/28/2022    KTV3LXE 26 9 04/28/2022    BEART 2 3 04/28/2022    SOURCE Radial, Right 04/28/2022   , BNP: No results found for: BNP, CBC:   Lab Results   Component Value Date    WBC 7 31 06/06/2022    HGB 11 8 (L) 06/06/2022    HCT 34 7 (L) 06/06/2022    MCV 93 06/06/2022     06/06/2022    MCH 31 6 06/06/2022    MCHC 34 0 06/06/2022    RDW 14 6 06/06/2022    MPV 9 2 06/06/2022    NRBC 0 06/06/2022   , CMP:   Lab Results   Component Value Date     09/27/2016    K 4 4 06/06/2022    K 4 2 09/27/2016     06/06/2022     09/27/2016    CO2 24 06/06/2022    CO2 23 09/27/2016    BUN 17 06/06/2022    BUN 14 09/27/2016    CREATININE 1 30 06/06/2022    CREATININE 1 09 09/27/2016    GLUCOSE 89 09/27/2016    CALCIUM 9 0 06/06/2022    CALCIUM 9 3 09/27/2016    AST 27 06/05/2022    AST 18 09/27/2016    ALT 16 06/05/2022    ALT 16 09/27/2016    ALKPHOS 77 06/05/2022    ALKPHOS 57 09/27/2016    PROT 6 9 09/27/2016    BILITOT 1 0 09/27/2016    EGFR 52 06/06/2022   , PT/INR:   Lab Results   Component Value Date    INR 1 10 04/28/2022   , Troponin: No results found for: TROPONIN    Imaging Studies: I have personally reviewed pertinent reports     and I have personally reviewed pertinent films in PACS    EKG, Pathology, and Other Studies: I have personally reviewed pertinent reports  VTE Prophylaxis: Enoxaparin (Lovenox)    Code Status: Level 1 - Full Code    Counseling/Coordination of Care: Total floor / unit time spent today 30 minutes  Greater than 50% of total time was spent with the patient and / or family counseling and / or coordination of care   A description of the counseling / coordination of care: I reviewed chest x-ray and discussed diagnosis and treatment with patient

## 2022-06-07 NOTE — OCCUPATIONAL THERAPY NOTE
OT EVALUATION/TREATMENT      06/07/22 0925   Note Type   Note type Evaluation   Restrictions/Precautions   Other Precautions Bed Alarm;O2;Fall Risk   Pain Assessment   Pain Assessment Tool 0-10   Pain Score No Pain   Home Living   Type of Home House   Home Layout One level   Bathroom Shower/Tub Walk-in shower   Bathroom Toilet Raised   Bathroom Equipment Grab bars in shower; Shower chair; Toilet raiser  (Merari Mixer with grab bars on either side)   Home Equipment Cane;Walker  (3L02)   Prior Function   Level of Rogers Independent with ADLs and functional mobility  (Ambulates with a single-point cane)   Lives With Alone   ADL Assistance Independent   IADLs Independent   Comments Patient admitted with COPD exacerbation and lightheadedness  Lifestyle   Intrinsic Gratification Going to the local clubhouse; sitting on the couch and watching T V    ADL   Eating Assistance 5  Supervision/Setup   Grooming Assistance 5  Supervision/Setup   UB Bathing Assistance 4  Minimal Assistance   UB Bathing Deficit Increased time to complete   LB Bathing Assistance 3  Moderate Assistance   LB Bathing Deficit Increased time to complete   UB Dressing Assistance 4  Minimal Assistance   UB Dressing Deficit Increased time to complete   LB Dressing Assistance 3  Moderate Assistance   Toileting Assistance  4  Minimal Assistance   Toileting Deficit Increased time to complete   Functional Assistance 4  Minimal Assistance   Additional Comments Patient don/doffed L sock with supervision, poor - sitting balance, increased time and increased shortness of breath with exertion  Bed Mobility   Supine to Sit 4  Minimal assistance   Sit to Supine 4  Minimal assistance   Transfers   Sit to Stand 4  Minimal assistance   Stand to Sit 4  Minimal assistance   Additional Comments Increased time to complete sit<>stand transfers  Pt reported feeling lightheaded movement     Functional Mobility   Functional Mobility 4  Minimal assistance   Additional Comments Patient ambulated short household distance to the bathroom with min assist and single-point cane  Balance   Static Sitting Fair   Dynamic Sitting Fair -   Static Standing Fair   Dynamic Standing Poor   Activity Tolerance   Activity Tolerance Patient limited by fatigue;Treatment limited secondary to medical complications (Comment)  (Shortness of breath)   RUE Assessment   RUE Assessment WFL  (Grossly 3+/5 MMT)   LUE Assessment   LUE Assessment WFL  (Grossly 3+/5 MMT)   Cognition   Overall Cognitive Status WFL   Arousal/Participation Alert; Responsive; Cooperative   Attention Within functional limits   Orientation Level Oriented X4   Following Commands Follows all commands and directions without difficulty   Assessment   Limitation Decreased ADL status; Decreased UE ROM; Decreased UE strength;Decreased endurance;Decreased fine motor control;Decreased self-care trans;Decreased high-level ADLs   Prognosis Good   Assessment Patient evaluated by Occupational Therapy  Patient [facility] with [facility]  The patients occupational profile, medical and therapy history includes a extensive additional review of physical, cognitive, or psychosocial history related to current functional performance  Comorbidities affecting functional mobility and ADLS include: arthritis, cancer and COPD  Prior to admission, patient was independent with functional mobility with single-point cane  and independent with IADLS  The evaluation identifies the following performance deficits: weakness, decreased ROM, impaired balance, decreased endurance, increased fall risk, decreased ADLS, decreased IADLS, decreased activity tolerance, SOB upon exertion and decreased strength, that result in activity limitations and/or participation restrictions   This evaluation requires clinical decision making of high complexity, because the patient presents with comorbidites that affect occupational performance and required significant modification of tasks or assistance with consideration of multiple treatment options  The Barthel Index was used as a functional outcome tool presenting with a score of Barthel Index Score: 50, indicating marked limitations of functional mobility and ADLS  The patient's raw score on the AM-PAC Daily Activity inpatient short form is 16, standardized score is 35 96, less than 39 4  Patients at this level are likely to benefit from DC to post-acute rehabilitation services  However patient would benefit from Home OT services  Patient has a supportive family  Please refer to the recommendation of the Occupational Therapist for safe DC planning  Patient will benefit from skilled Occupational Therapy services to address above deficits and facilitate a safe return to prior level of function  Goals   Patient Goals To have better breathing   STG Time Frame   (1-7 days)   Short Term Goal  Goals established to promote Patient Goals: To have better breathing:  Patient will increase standing tolerance to 8 minutes during ADL task to decrease assistance level and decrease fall risk; Patient will increase bed mobility to supervision in preparation for ADLS and transfers;  Patient will increase functional mobility to and from bathroom with single point cane with supervision to increase performance with ADLS and to use a toilet; Patient will tolerate 8 minutes of UE ROM/strengthening to increase general activity tolerance and performance in ADLS/IADLS; Patient will improve functional activity tolerance to 8 minutes of sustained functional tasks to increase participation in basic self-care and decrease assistance level;  Patient will be able to to verbalize understanding and perform energy conservation/proper body mechanics during ADLS and functional mobility at least 75% of the time with moderate cueing to decrease signs of fatigue and increase stamina to return to prior level of function; Patient will increase dynamic sitting balance to fair to improve the ability to sit at edge of bed or on a chair for ADLS;  Patient will increase dynamic standing balance to poor+ to improve postural stability and decrease fall risk during standing ADLS and transfers  LTG Time Frame   (8-14 days)   Long Term Goal Patient will increase standing tolerance to 16 minutes during ADL task to decrease assistance level and decrease fall risk; Patient will increase bed mobility to independent in preparation for ADLS and transfers; Patient will increase functional mobility to and from bathroom with single point cane independently to increase performance with ADLS and to use a toilet; Patient will tolerate 16 minutes of UE ROM/strengthening to increase general activity tolerance and performance in ADLS/IADLS; Patient will improve functional activity tolerance to 16 minutes of sustained functional tasks to increase participation in basic self-care and decrease assistance level;  Patient will be able to to verbalize understanding and perform energy conservation/proper body mechanics during ADLS and functional mobility at least 90% of the time with no cueing to decrease signs of fatigue and increase stamina to return to prior level of function; Patient will increase static/dynamic sitting balance to fair+ to improve the ability to sit at edge of bed or on a chair for ADLS;  Patient will increase dynamic standing balance to fair- to improve postural stability and decrease fall risk during standing ADLS and transfers  Pt will score >/= 20/24 on AM-PAC Daily Activity Inpatient scale to promote safe independence with ADLs and functional mobility; Pt will score >/= 80/100 on Barthel Index in order to decrease caregiver assistance needed and increase ability to perform ADLs and functional mobility     Functional Transfer Goals   Pt Will Perform All Functional Transfers   (STG supervision LTG independent)   ADL Goals   Pt Will Perform Eating   (STG independent) Pt Will Perform Grooming   (STG independent)   Pt Will Perform UE Dressing   (STG supervision LTG independent)   Pt Will Perform LE Dressing   (STG min assist LTG supervision)   Pt Will Perform Toileting   (STG supervision LTG independent)   Plan   Treatment Interventions ADL retraining;Functional transfer training;UE strengthening/ROM; Endurance training;Patient/family training;Energy conservation; Activityengagement   Goal Expiration Date 06/21/22   OT Frequency 3-5x/wk   Additional Treatment Session   Start Time 0915   End Time 0925   Treatment Assessment S: "I am feeling lightheadedness" Pt reports no pain 0/10  O: Pt completed ambulating toliet transfer on a standard toliet with min assit and use of a single-point cane  Pt ambulated short household distance from the bathroom with min assist and use of a single-point cane  Patient completed AROM exercies (shoulder flexion, abduction, elbow flexion/extension, wrist flexion/extension, ) 5 reps x 1 set with increased time due to increased shortness of breath  A;  Patient is limited by COPD exacerbation, requiring increased time to complete ADL/IADL activites with frequent rest breaks  Patient was educated on purse lip breathing and demonstrated  80% of the time  Patient is generally weak and deconditioned and would benefit from continued skilled OT services to maximize functional mobility and participation in ADLS/IADLS  P: Home OT     Recommendation   OT Discharge Recommendation Home with home health rehabilitation   AM-PAC Daily Activity Inpatient   Lower Body Dressing 2   Bathing 2   Toileting 3   Upper Body Dressing 3   Grooming 3   Eating 3   Daily Activity Raw Score 16   Daily Activity Standardized Score (Calc for Raw Score >=11) 35 96   AM-PAC Applied Cognition Inpatient   Following a Speech/Presentation 4   Understanding Ordinary Conversation 4   Taking Medications 4   Remembering Where Things Are Placed or Put Away 4   Remembering List of 4-5 Errands 4   Taking Care of Complicated Tasks 3   Applied Cognition Raw Score 23   Applied Cognition Standardized Score 53 08   Barthel Index   Feeding 10   Bathing 0   Grooming Score 0   Dressing Score 5   Bladder Score 10   Bowels Score 10   Toilet Use Score 5   Transfers (Bed/Chair) Score 10   Mobility (Level Surface) Score 0   Stairs Score 0   Barthel Index Score 50   Licensure   [place] License Number  [employer]

## 2022-06-07 NOTE — PROGRESS NOTES
[facility] Practice Progress Note - Magdaleno Cortes 65 y o  male MRN: [id number]    Unit/Bed#: [address] [place] [address] Encounter: [id number]      Assessment/Plan:  * COPD with acute exacerbation [facility])  Assessment & Plan  Acute on chronic  Patient with history of AAT deficiency and centrilobular emphysema presents with worsening sob over the last couple days with no relief of symptoms with use of nebulizer at home  He is on home oxygen baseline 2-3 L  At the ER he received DuoNebs, Solu-Medrol, albuterol  · Chest x-ray results pending  · Solu Medrol 40 mg q 8h  · Continue home Pulmicort  · Continue albuterol q 4h p r n  · DuoNebs 4 times daily  · Continues pulse ox  · Spirometry  · Keep SpO2 above 92%  · Patient receives weekly Zemaira injections for AAT, will order when indicated patient remains in hospital     Parkinson's disease [facility])  Assessment & Plan  Chronic  · Continue carbidopa-levodopa    Depression, recurrent ([facility] )  Assessment & Plan  Chronic, stable  · Continue home medications mirtazapine and BuSpar    Peripheral neuropathy  Assessment & Plan  Chronic, stable  · Continue gabapentin    Ambulatory dysfunction  Assessment & Plan  Chronic  · Fall precautions  · PT/OT    Insomnia  Assessment & Plan  Chronic, stable  · Continue home trazodone and mirtazapine    BPH (benign prostatic hyperplasia)  Assessment & Plan  Chronic, stable  · Continue Flomax and finasteride    Acute and chronic respiratory failure with hypoxia (HCC)  Assessment & Plan  Acute on chronic  Secondary to AAT and centrilobular emphysema with exacerbation of symptoms as evidence by respiratory distress, increased requirement for bronchodilators, and tachypnea  · See plan above      Essential hypertension  Assessment & Plan  Chronic, stable  · Monitor blood pressures  · Continue home medications Lopressor and Norvasc  · Continue midodrine with hold parameters    Gastroesophageal reflux disease  Assessment & Plan  Chronic, stable  · Continue home Protonix 40 mg b i d  Alpha-1-antitrypsin deficiency [facility])  Assessment & Plan  He is on Zemaira once weekly  Continue home medication when indicated  Subjective:   Patient seen at bedside  Reports no improvement from yesterday  Continues with shortness of breath on exertion  Denies chest pain  Reports his last bowel movement was yesterday  Objective:     Vitals: Blood pressure 117/59, pulse 67, temperature 98 2 °F (36 8 °C), temperature source Oral, resp  rate 20, height 6' 5" (1 956 m), weight 86 2 kg (190 lb), SpO2 92 %  ,Body mass index is 22 53 kg/m²  Wt Readings from Last 3 Encounters:   06/05/22 86 2 kg (190 lb)   05/25/22 81 9 kg (180 lb 9 6 oz)   05/23/22 82 1 kg (181 lb)       Intake/Output Summary (Last 24 hours) at 6/7/2022 0851  Last data filed at 6/7/2022 0600  Gross per 24 hour   Intake 150 ml   Output 433 ml   Net -283 ml       Physical Exam  Vitals reviewed  Constitutional:       General: He is awake  He is not in acute distress  Pulmonary:      Effort: Pulmonary effort is normal       Breath sounds: Normal breath sounds  Decreased air movement present  No decreased breath sounds, wheezing, rhonchi or rales  Abdominal:      General: Abdomen is protuberant  Bowel sounds are normal       Palpations: Abdomen is soft  Tenderness: There is abdominal tenderness in the right lower quadrant and epigastric area  There is no guarding or rebound  Musculoskeletal:      Right lower leg: No edema  Left lower leg: No edema  Neurological:      Mental Status: He is alert  Psychiatric:         Behavior: Behavior is cooperative             Recent Results (from the past 24 hour(s))   Occult blood 1-3, stool    Collection Time: 06/06/22 10:31 AM   Result Value Ref Range    Fecal Occult Blood Diagnostic Negative Negative    Fecal Occult Blood Diagnostic 2 Test not performed Negative    Fecal Occult Blood Diagnostic 3 Test not performed Negative   CBC and differential    Collection Time: 06/07/22  6:06 AM   Result Value Ref Range    WBC 10 07 4 31 - 10 16 Thousand/uL    RBC 3 59 (L) 3 88 - 5 62 Million/uL    Hemoglobin 11 4 (L) 12 0 - 17 0 g/dL    Hematocrit 33 6 (L) 36 5 - 49 3 %    MCV 94 82 - 98 fL    MCH 31 8 26 8 - 34 3 pg    MCHC 33 9 31 4 - 37 4 g/dL    RDW 14 6 11 6 - 15 1 %    MPV 9 3 8 9 - 12 7 fL    Platelets 606 237 - 441 Thousands/uL    nRBC 0 /100 WBCs    Neutrophils Relative 74 43 - 75 %    Immat GRANS % 2 0 - 2 %    Lymphocytes Relative 19 14 - 44 %    Monocytes Relative 5 4 - 12 %    Eosinophils Relative 0 0 - 6 %    Basophils Relative 0 0 - 1 %    Neutrophils Absolute 7 55 1 85 - 7 62 Thousands/µL    Immature Grans Absolute 0 16 0 00 - 0 20 Thousand/uL    Lymphocytes Absolute 1 87 0 60 - 4 47 Thousands/µL    Monocytes Absolute 0 48 0 17 - 1 22 Thousand/µL    Eosinophils Absolute 0 00 0 00 - 0 61 Thousand/µL    Basophils Absolute 0 01 0 00 - 0 10 Thousands/µL   Basic metabolic panel    Collection Time: 06/07/22  6:06 AM   Result Value Ref Range    Sodium 141 136 - 145 mmol/L    Potassium 4 7 3 5 - 5 3 mmol/L    Chloride 104 100 - 108 mmol/L    CO2 26 21 - 32 mmol/L    ANION GAP 11 4 - 13 mmol/L    BUN 28 (H) 5 - 25 mg/dL    Creatinine 1 17 0 60 - 1 30 mg/dL    Glucose 129 65 - 140 mg/dL    Calcium 8 7 8 3 - 10 1 mg/dL    eGFR 59 ml/min/1 73sq m   Procalcitonin    Collection Time: 06/07/22  6:06 AM   Result Value Ref Range    Procalcitonin 0 07 <=0 25 ng/ml       Current Facility-Administered Medications   Medication Dose Route Frequency Provider Last Rate Last Admin    acetaminophen (TYLENOL) tablet 650 mg  650 mg Oral Q6H PRN Hodan Hirsch MD        amLODIPine (NORVASC) tablet 10 mg  10 mg Oral Daily Hodan Hirsch MD   10 mg at 06/06/22 1003    budesonide (PULMICORT) inhalation solution 0 5 mg  0 5 mg Nebulization BID Hodan Hirsch MD   0 5 mg at 06/07/22 3201    busPIRone (BUSPAR) tablet 10 mg  10 mg Oral TID Blanca Urbano Wiley Pink MD   10 mg at 06/06/22 2116    carbidopa-levodopa (SINEMET)  mg per tablet 1 tablet  1 tablet Oral TID Taffy Lefort, MD   1 tablet at 06/06/22 2117    cholestyramine sugar free (QUESTRAN LIGHT) packet 4 g  4 g Oral Daily Taffy Lefort, MD   4 g at 06/06/22 1003    enoxaparin (LOVENOX) subcutaneous injection 40 mg  40 mg Subcutaneous Daily Taffy Lefort, MD   40 mg at 06/06/22 1003    finasteride (PROSCAR) tablet 5 mg  5 mg Oral QAM Taffy Lefort, MD   5 mg at 06/06/22 1003    fluticasone (FLONASE) 50 mcg/act nasal spray 2 spray  2 spray Nasal Daily Taffy Lefort, MD   2 spray at 06/06/22 1003    gabapentin (NEURONTIN) capsule 300 mg  300 mg Oral TID Taffy Lefort, MD   300 mg at 06/06/22 2116    guaiFENesin (ROBITUSSIN) oral solution 200 mg  200 mg Oral Q4H Cortney Pavon MD   200 mg at 06/07/22 0601    ipratropium-albuterol (DUO-NEB) 0 5-2 5 mg/3 mL inhalation solution 3 mL  3 mL Nebulization 4x Daily Taffy Lefort, MD   3 mL at 06/07/22 5121    melatonin tablet 3 mg  3 mg Oral HS Louise Guzman DO   3 mg at 06/06/22 2331    methylPREDNISolone sodium succinate (Solu-MEDROL) injection 40 mg  40 mg Intravenous Q8H 1900 Michael Diaz MD   40 mg at 06/07/22 0601    metoprolol tartrate (LOPRESSOR) tablet 25 mg  25 mg Oral Q12H Enzo Moyer MD   25 mg at 06/06/22 2116    midodrine (PROAMATINE) tablet 10 mg  10 mg Oral TID AC Taffy Lefort, MD   10 mg at 06/07/22 0601    mirtazapine (REMERON) tablet 30 mg  30 mg Oral HS Taffy Lefort, MD   30 mg at 06/06/22 2116    pantoprazole (PROTONIX) EC tablet 40 mg  40 mg Oral BID Taffy Lefort, MD   40 mg at 06/06/22 1704    tamsulosin (FLOMAX) capsule 0 4 mg  0 4 mg Oral Daily With [employer], DO   0 4 mg at 06/06/22 1610       Invasive Devices  Report Peripheral Intravenous Line  Duration           Peripheral IV 06/05/22 Right Antecubital 1 day                Lab, Imaging and other studies: I have personally reviewed pertinent reports      VTE Pharmacologic Prophylaxis: Enoxaparin (Lovenox)  VTE Mechanical Prophylaxis: sequential compression device    Arun Pacheco MD

## 2022-06-08 ENCOUNTER — APPOINTMENT (INPATIENT)
Dept: RADIOLOGY | Facility: HOSPITAL | Age: 77
DRG: 190 | End: 2022-06-08
Payer: MEDICARE

## 2022-06-08 LAB
ANION GAP SERPL CALCULATED.3IONS-SCNC: 11 MMOL/L (ref 4–13)
BASOPHILS # BLD AUTO: 0.03 THOUSANDS/ΜL (ref 0–0.1)
BASOPHILS NFR BLD AUTO: 0 % (ref 0–1)
BUN SERPL-MCNC: 33 MG/DL (ref 5–25)
CALCIUM SERPL-MCNC: 8.9 MG/DL (ref 8.3–10.1)
CHLORIDE SERPL-SCNC: 105 MMOL/L (ref 100–108)
CO2 SERPL-SCNC: 25 MMOL/L (ref 21–32)
CREAT SERPL-MCNC: 1.13 MG/DL (ref 0.6–1.3)
EOSINOPHIL # BLD AUTO: 0 THOUSAND/ΜL (ref 0–0.61)
EOSINOPHIL NFR BLD AUTO: 0 % (ref 0–6)
ERYTHROCYTE [DISTWIDTH] IN BLOOD BY AUTOMATED COUNT: 15 % (ref 11.6–15.1)
GFR SERPL CREATININE-BSD FRML MDRD: 61 ML/MIN/1.73SQ M
GLUCOSE SERPL-MCNC: 112 MG/DL (ref 65–140)
HCT VFR BLD AUTO: 35.6 % (ref 36.5–49.3)
HGB BLD-MCNC: 11.9 G/DL (ref 12–17)
IMM GRANULOCYTES # BLD AUTO: 0.28 THOUSAND/UL (ref 0–0.2)
IMM GRANULOCYTES NFR BLD AUTO: 2 % (ref 0–2)
LYMPHOCYTES # BLD AUTO: 2.68 THOUSANDS/ΜL (ref 0.6–4.47)
LYMPHOCYTES NFR BLD AUTO: 23 % (ref 14–44)
MCH RBC QN AUTO: 31.6 PG (ref 26.8–34.3)
MCHC RBC AUTO-ENTMCNC: 33.4 G/DL (ref 31.4–37.4)
MCV RBC AUTO: 95 FL (ref 82–98)
MONOCYTES # BLD AUTO: 0.62 THOUSAND/ΜL (ref 0.17–1.22)
MONOCYTES NFR BLD AUTO: 5 % (ref 4–12)
NEUTROPHILS # BLD AUTO: 8.02 THOUSANDS/ΜL (ref 1.85–7.62)
NEUTS SEG NFR BLD AUTO: 70 % (ref 43–75)
NRBC BLD AUTO-RTO: 0 /100 WBCS
PLATELET # BLD AUTO: 182 THOUSANDS/UL (ref 149–390)
PMV BLD AUTO: 9.8 FL (ref 8.9–12.7)
POTASSIUM SERPL-SCNC: 4.7 MMOL/L (ref 3.5–5.3)
RBC # BLD AUTO: 3.76 MILLION/UL (ref 3.88–5.62)
SODIUM SERPL-SCNC: 141 MMOL/L (ref 136–145)
WBC # BLD AUTO: 11.63 THOUSAND/UL (ref 4.31–10.16)

## 2022-06-08 PROCEDURE — 99223 1ST HOSP IP/OBS HIGH 75: CPT | Performed by: NURSE PRACTITIONER

## 2022-06-08 PROCEDURE — 92610 EVALUATE SWALLOWING FUNCTION: CPT

## 2022-06-08 PROCEDURE — 94640 AIRWAY INHALATION TREATMENT: CPT

## 2022-06-08 PROCEDURE — 99232 SBSQ HOSP IP/OBS MODERATE 35: CPT | Performed by: INTERNAL MEDICINE

## 2022-06-08 PROCEDURE — 80048 BASIC METABOLIC PNL TOTAL CA: CPT

## 2022-06-08 PROCEDURE — 99232 SBSQ HOSP IP/OBS MODERATE 35: CPT | Performed by: FAMILY MEDICINE

## 2022-06-08 PROCEDURE — 74230 X-RAY XM SWLNG FUNCJ C+: CPT

## 2022-06-08 PROCEDURE — 85025 COMPLETE CBC W/AUTO DIFF WBC: CPT

## 2022-06-08 PROCEDURE — 94760 N-INVAS EAR/PLS OXIMETRY 1: CPT

## 2022-06-08 PROCEDURE — 92526 ORAL FUNCTION THERAPY: CPT

## 2022-06-08 PROCEDURE — 92611 MOTION FLUOROSCOPY/SWALLOW: CPT

## 2022-06-08 RX ADMIN — CARBIDOPA AND LEVODOPA 1 TABLET: 25; 100 TABLET ORAL at 17:00

## 2022-06-08 RX ADMIN — BUDESONIDE 0.5 MG: 0.5 INHALANT ORAL at 20:10

## 2022-06-08 RX ADMIN — PANTOPRAZOLE SODIUM 40 MG: 40 TABLET, DELAYED RELEASE ORAL at 09:24

## 2022-06-08 RX ADMIN — GUAIFENESIN 200 MG: 100 SOLUTION ORAL at 14:48

## 2022-06-08 RX ADMIN — TAMSULOSIN HYDROCHLORIDE 0.4 MG: 0.4 CAPSULE ORAL at 17:00

## 2022-06-08 RX ADMIN — ENOXAPARIN SODIUM 40 MG: 40 INJECTION SUBCUTANEOUS at 09:23

## 2022-06-08 RX ADMIN — IPRATROPIUM BROMIDE AND ALBUTEROL SULFATE 3 ML: 2.5; .5 SOLUTION RESPIRATORY (INHALATION) at 07:17

## 2022-06-08 RX ADMIN — FINASTERIDE 5 MG: 5 TABLET, FILM COATED ORAL at 09:24

## 2022-06-08 RX ADMIN — GUAIFENESIN 200 MG: 100 SOLUTION ORAL at 21:45

## 2022-06-08 RX ADMIN — CHOLESTYRAMINE 4 G: 4 POWDER, FOR SUSPENSION ORAL at 09:25

## 2022-06-08 RX ADMIN — GABAPENTIN 300 MG: 300 CAPSULE ORAL at 17:00

## 2022-06-08 RX ADMIN — MIRTAZAPINE 30 MG: 15 TABLET, FILM COATED ORAL at 21:45

## 2022-06-08 RX ADMIN — GUAIFENESIN 200 MG: 100 SOLUTION ORAL at 02:06

## 2022-06-08 RX ADMIN — BUDESONIDE 0.5 MG: 0.5 INHALANT ORAL at 07:17

## 2022-06-08 RX ADMIN — GUAIFENESIN 200 MG: 100 SOLUTION ORAL at 06:27

## 2022-06-08 RX ADMIN — FLUTICASONE PROPIONATE 2 SPRAY: 50 SPRAY, METERED NASAL at 09:56

## 2022-06-08 RX ADMIN — GUAIFENESIN 200 MG: 100 SOLUTION ORAL at 17:03

## 2022-06-08 RX ADMIN — GUAIFENESIN 200 MG: 100 SOLUTION ORAL at 09:23

## 2022-06-08 RX ADMIN — PANTOPRAZOLE SODIUM 40 MG: 40 TABLET, DELAYED RELEASE ORAL at 17:00

## 2022-06-08 RX ADMIN — METHYLPREDNISOLONE SODIUM SUCCINATE 20 MG: 40 INJECTION, POWDER, FOR SOLUTION INTRAMUSCULAR; INTRAVENOUS at 14:48

## 2022-06-08 RX ADMIN — GABAPENTIN 300 MG: 300 CAPSULE ORAL at 21:45

## 2022-06-08 RX ADMIN — BUSPIRONE HYDROCHLORIDE 10 MG: 5 TABLET ORAL at 09:24

## 2022-06-08 RX ADMIN — METHYLPREDNISOLONE SODIUM SUCCINATE 20 MG: 40 INJECTION, POWDER, FOR SOLUTION INTRAMUSCULAR; INTRAVENOUS at 21:45

## 2022-06-08 RX ADMIN — IPRATROPIUM BROMIDE AND ALBUTEROL SULFATE 3 ML: 2.5; .5 SOLUTION RESPIRATORY (INHALATION) at 15:18

## 2022-06-08 RX ADMIN — CARBIDOPA AND LEVODOPA 1 TABLET: 25; 100 TABLET ORAL at 09:24

## 2022-06-08 RX ADMIN — BUSPIRONE HYDROCHLORIDE 10 MG: 5 TABLET ORAL at 21:45

## 2022-06-08 RX ADMIN — BUSPIRONE HYDROCHLORIDE 10 MG: 5 TABLET ORAL at 17:00

## 2022-06-08 RX ADMIN — METOPROLOL TARTRATE 25 MG: 25 TABLET, FILM COATED ORAL at 09:24

## 2022-06-08 RX ADMIN — IPRATROPIUM BROMIDE AND ALBUTEROL SULFATE 3 ML: 2.5; .5 SOLUTION RESPIRATORY (INHALATION) at 20:10

## 2022-06-08 RX ADMIN — GABAPENTIN 300 MG: 300 CAPSULE ORAL at 09:24

## 2022-06-08 RX ADMIN — METHYLPREDNISOLONE SODIUM SUCCINATE 20 MG: 40 INJECTION, POWDER, FOR SOLUTION INTRAMUSCULAR; INTRAVENOUS at 06:28

## 2022-06-08 RX ADMIN — CARBIDOPA AND LEVODOPA 1 TABLET: 25; 100 TABLET ORAL at 21:45

## 2022-06-08 RX ADMIN — Medication 3 MG: at 21:45

## 2022-06-08 RX ADMIN — AMLODIPINE BESYLATE 10 MG: 5 TABLET ORAL at 09:24

## 2022-06-08 RX ADMIN — MIDODRINE HYDROCHLORIDE 10 MG: 5 TABLET ORAL at 06:30

## 2022-06-08 RX ADMIN — METOPROLOL TARTRATE 25 MG: 25 TABLET, FILM COATED ORAL at 21:45

## 2022-06-08 NOTE — PROGRESS NOTES
[facility] Practice Progress Note - Erin Sethi 65 y o  male MRN: [id number]    Unit/Bed#: [address] [place] [address] Encounter: [id number]      Assessment/Plan:  * COPD with acute exacerbation [facility])  Assessment & Plan  Acute on chronic  Patient with history of AAT deficiency and centrilobular emphysema presents with worsening sob over the last couple days with no relief of symptoms with use of nebulizer at home  He is on home oxygen baseline 2-3 L  At the ER he received DuoNebs, Solu-Medrol, albuterol  · Chest x-ray results pending  · Solu Medrol 20 mg q 12h  · Continue home Pulmicort  · Continue albuterol q 4h p r n  · DuoNebs 4 times daily  · Continues pulse ox  · Spirometry  · Keep SpO2 above 92%  · Patient receives weekly Zemaira injections for AAT, will order when indicated patient remains in hospital     Parkinson's disease [facility])  Assessment & Plan  Chronic  · Continue carbidopa-levodopa  · Video barium swallow  · Speech consult  · Neurology consult    Depression, recurrent ([address] )  Assessment & Plan  Chronic, stable  · Continue home medications mirtazapine and BuSpar    Peripheral neuropathy  Assessment & Plan  Chronic, stable  · Continue gabapentin    Ambulatory dysfunction  Assessment & Plan  Chronic  · Fall precautions  · PT/OT    Insomnia  Assessment & Plan  Chronic, stable  · Continue home trazodone and mirtazapine    BPH (benign prostatic hyperplasia)  Assessment & Plan  Chronic, stable  · Continue Flomax and finasteride    Acute and chronic respiratory failure with hypoxia (HCC)  Assessment & Plan  Acute on chronic  Secondary to AAT and centrilobular emphysema with exacerbation of symptoms as evidence by respiratory distress, increased requirement for bronchodilators, and tachypnea  · See plan above      Essential hypertension  Assessment & Plan  Chronic, stable  · Monitor blood pressures  · Continue home medications Lopressor and Norvasc  · Continue midodrine with hold parameters    Gastroesophageal reflux disease  Assessment & Plan  Chronic, stable  · Continue home Protonix 40 mg b i d  Alpha-1-antitrypsin deficiency [facility])  Assessment & Plan  He is on Zemaira once weekly  Continue home medication when indicated  Subjective:   Patient seen at bedside  Reports he has not seen improvement in his shortness of breath as he is still short of breath on exertion but denies chest pain  He does report some improvement of his cough with medication but continues coughing and reports abdominal pain  Has not had a bowel movement since a day ago  Denies nausea vomiting diarrhea  Reports he did not really eat too much yesterday because of poor appetite  Objective:     Vitals: Blood pressure 148/68, pulse 60, temperature 98 1 °F (36 7 °C), temperature source Oral, resp  rate 20, height 6' 5" (1 956 m), weight 86 2 kg (190 lb), SpO2 97 %  ,Body mass index is 22 53 kg/m²  Wt Readings from Last 3 Encounters:   06/05/22 86 2 kg (190 lb)   05/25/22 81 9 kg (180 lb 9 6 oz)   05/23/22 82 1 kg (181 lb)       Intake/Output Summary (Last 24 hours) at 6/8/2022 0912  Last data filed at 6/7/2022 1001  Gross per 24 hour   Intake 240 ml   Output --   Net 240 ml       Physical Exam  Vitals reviewed  Constitutional:       General: He is awake  He is not in acute distress  Cardiovascular:      Rate and Rhythm: Normal rate and regular rhythm  Heart sounds: Normal heart sounds, S1 normal and S2 normal    Pulmonary:      Effort: Pulmonary effort is normal       Breath sounds: Decreased air movement present  Examination of the right-middle field reveals wheezing  Examination of the left-middle field reveals wheezing  Wheezing (minimal) present  No decreased breath sounds, rhonchi or rales  Abdominal:      General: Abdomen is protuberant  Bowel sounds are normal       Palpations: Abdomen is soft  Tenderness: There is no abdominal tenderness  There is no guarding  Neurological:      Mental Status: He is alert  Psychiatric:         Behavior: Behavior is cooperative              Recent Results (from the past 24 hour(s))   CBC and differential    Collection Time: 06/08/22  4:20 AM   Result Value Ref Range    WBC 11 63 (H) 4 31 - 10 16 Thousand/uL    RBC 3 76 (L) 3 88 - 5 62 Million/uL    Hemoglobin 11 9 (L) 12 0 - 17 0 g/dL    Hematocrit 35 6 (L) 36 5 - 49 3 %    MCV 95 82 - 98 fL    MCH 31 6 26 8 - 34 3 pg    MCHC 33 4 31 4 - 37 4 g/dL    RDW 15 0 11 6 - 15 1 %    MPV 9 8 8 9 - 12 7 fL    Platelets 682 618 - 537 Thousands/uL    nRBC 0 /100 WBCs    Neutrophils Relative 70 43 - 75 %    Immat GRANS % 2 0 - 2 %    Lymphocytes Relative 23 14 - 44 %    Monocytes Relative 5 4 - 12 %    Eosinophils Relative 0 0 - 6 %    Basophils Relative 0 0 - 1 %    Neutrophils Absolute 8 02 (H) 1 85 - 7 62 Thousands/µL    Immature Grans Absolute 0 28 (H) 0 00 - 0 20 Thousand/uL    Lymphocytes Absolute 2 68 0 60 - 4 47 Thousands/µL    Monocytes Absolute 0 62 0 17 - 1 22 Thousand/µL    Eosinophils Absolute 0 00 0 00 - 0 61 Thousand/µL    Basophils Absolute 0 03 0 00 - 0 10 Thousands/µL   Basic metabolic panel    Collection Time: 06/08/22  4:20 AM   Result Value Ref Range    Sodium 141 136 - 145 mmol/L    Potassium 4 7 3 5 - 5 3 mmol/L    Chloride 105 100 - 108 mmol/L    CO2 25 21 - 32 mmol/L    ANION GAP 11 4 - 13 mmol/L    BUN 33 (H) 5 - 25 mg/dL    Creatinine 1 13 0 60 - 1 30 mg/dL    Glucose 112 65 - 140 mg/dL    Calcium 8 9 8 3 - 10 1 mg/dL    eGFR 61 ml/min/1 73sq m       Current Facility-Administered Medications   Medication Dose Route Frequency Provider Last Rate Last Admin    acetaminophen (TYLENOL) tablet 650 mg  650 mg Oral Q6H PRN Demar Reyes MD        amLODIPine (NORVASC) tablet 10 mg  10 mg Oral Daily Demar Reyes MD   10 mg at 06/07/22 0932    budesonide (PULMICORT) inhalation solution 0 5 mg  0 5 mg Nebulization BID Demar Reyes MD   0 5 mg at 06/08/22 1424    busPIRone (BUSPAR) tablet 10 mg  10 mg Oral TID Donna Vieyra MD   10 mg at 06/07/22 2103    carbidopa-levodopa (SINEMET)  mg per tablet 1 tablet  1 tablet Oral TID Donna Vieyra MD   1 tablet at 06/07/22 2103    cholestyramine sugar free (QUESTRAN LIGHT) packet 4 g  4 g Oral Daily Donna Vieyra MD   4 g at 06/07/22 0933    enoxaparin (LOVENOX) subcutaneous injection 40 mg  40 mg Subcutaneous Daily Donna Vieyra MD   40 mg at 06/07/22 4332    finasteride (PROSCAR) tablet 5 mg  5 mg Oral QAM Donna Vieyra MD   5 mg at 06/07/22 0932    fluticasone (FLONASE) 50 mcg/act nasal spray 2 spray  2 spray Nasal Daily Donna Vieyra MD   2 spray at 06/07/22 9029    gabapentin (NEURONTIN) capsule 300 mg  300 mg Oral TID Donna Vieyra MD   300 mg at 06/07/22 2104    guaiFENesin (ROBITUSSIN) oral solution 200 mg  200 mg Oral Q4H Tammy Morris MD   200 mg at 06/08/22 0210    ipratropium-albuterol (DUO-NEB) 0 5-2 5 mg/3 mL inhalation solution 3 mL  3 mL Nebulization 4x Daily Donna Vieyra MD   3 mL at 06/08/22 0717    ipratropium-albuterol (DUO-NEB) 0 5-2 5 mg/3 mL inhalation solution 3 mL  3 mL Nebulization Q2H PRN Tammy Morris MD   3 mL at 06/07/22 1407    melatonin tablet 3 mg  3 mg Oral HS Louise Guzman DO   3 mg at 06/07/22 2103    methylPREDNISolone sodium succinate (Solu-MEDROL) injection 20 mg  20 mg Intravenous Q8H 1900 Michael Diaz MD   20 mg at 06/08/22 3147    metoprolol tartrate (LOPRESSOR) tablet 25 mg  25 mg Oral Q12H Enzo Moyer MD   25 mg at 06/07/22 2103    midodrine (PROAMATINE) tablet 10 mg  10 mg Oral TID AC Donna Vieyra MD   10 mg at 06/08/22 0630    mirtazapine (REMERON) tablet 30 mg  30 mg Oral HS Donna Vieyra MD   30 mg at 06/07/22 2103    ondansetron (ZOFRAN-ODT) dispersible tablet 4 mg  4 mg Oral Q6H PRN [address], MD   4 mg at 06/07/22 1402    pantoprazole (PROTONIX) EC tablet 40 mg  40 mg Oral BID Veronica Gar MD   40 mg at 06/07/22 1708    tamsulosin (FLOMAX) capsule 0 4 mg  0 4 mg Oral Daily With RatingBug, DO   0 4 mg at 06/07/22 1708       Invasive Devices  Report    Peripheral Intravenous Line  Duration           Peripheral IV 06/05/22 Right Antecubital 2 days                Lab, Imaging and other studies: I have personally reviewed pertinent reports      VTE Pharmacologic Prophylaxis: Enoxaparin (Lovenox)  VTE Mechanical Prophylaxis: sequential compression device    Veronica Gar MD

## 2022-06-08 NOTE — PLAN OF CARE
Assessment Summary:    Pt presents with at least mild oropharyngeal dysphagia characterized primarily by reduced oral control, weak airway rise, incomplete epiglottic inversion with food>liquid retention in the vallecula with risk for episodes of overflow penetration into the larynx and overflow aspiration  Strategies reduced but did not eliminate all risk  Exercises to strengthen swallow could optimize airway protection and propulsion of boluses through the pharynx  Note: Images are available for review in PACS as desired  Recommendations:   Recommended Diet:  soft/level 3 diet and thin liquids   Recommended Form of Medications: as best tolerated (continue assessment)   Aspiration precautions and compensatory swallowing strategies:  1  Slow rate of feeding allowing time to use strategies  2   Multiple and effortful swallows to minimize food residue in the pharynx and risk for overflow

## 2022-06-08 NOTE — SPEECH THERAPY NOTE
Speech-Language Pathology Bedside Swallow Evaluation      Patient Name: Lucie Coyne    ZRGVO'D Date: 6/8/2022     Problem List  Principal Problem:    COPD with acute exacerbation ([facility] )  Active Problems:    Alpha-1-antitrypsin deficiency ([facility] )    Gastroesophageal reflux disease    Essential hypertension    Acute and chronic respiratory failure with hypoxia ([facility])    BPH (benign prostatic hyperplasia)    Insomnia    Ambulatory dysfunction    Peripheral neuropathy    Depression, recurrent ([facility] )    Parkinson's disease ([place] [address] )      Past Medical History  Past Medical History:   Diagnosis Date    Anesthesia complication     Difficult to wake up    Arthritis     BPH (benign prostatic hyperplasia)     urinary frequency    Cancer ([place] [address] )     basal cell neck, face    COPD (chronic obstructive pulmonary disease) ([facility])     COPD exacerbation ([place] [address] ) 12/29/2019    Elevated PSA 10/25/2018    Full dentures     Hiatal hernia     History of methicillin resistant staphylococcus aureus (MRSA)     10/11/2018 MRSA (nares) positive    Hypertension     Irritable bowel syndrome     Kidney stone     at least 7 episodes    Liver disease     Alpha 1- enzyme deficiency - diagnosed 2002  has been on weekly replacement therapy since then    Pulmonary emphysema ([facility] )     1/25/15  FEV1 - 2 45 liters or 59% of predicted    RSV infection 12/2017    Wears glasses     for driving only       Past Surgical History  Past Surgical History:   Procedure Laterality Date    BACK SURGERY  2008    discectomy    CARDIAC CATHETERIZATION N/A 5/3/2022    Procedure: Cardiac catheterization both left and right heart catheterization with vaso dilatory trial;  Surgeon: Shaji Rosario MD;  Location: [place] [address] CATH LAB; Service: Cardiology    CARDIAC CATHETERIZATION Left 5/3/2022    Procedure: Cardiac Left Heart Cath;  Surgeon: Shaji Rosario MD;  Location: [place] [address] CATH LAB;   Service: Cardiology    CARDIAC CATHETERIZATION Left 5/3/2022    Procedure: Cardiac Left Ventriculogram;  Surgeon: Antwan Guajardo MD;  Location: [place] [address] CATH LAB; Service: Cardiology    COLONOSCOPY      COLONOSCOPY N/A 3/10/2017    Procedure: Kristina Joshi;  Surgeon: Dax Torres MD;  Location: [place] [address] GI LAB; Service:    [facility]      removal of kidney stones    ESOPHAGOGASTRODUODENOSCOPY N/A 3/10/2017    Procedure: ESOPHAGOGASTRODUODENOSCOPY (EGD); Surgeon: Dax Torres MD;  Location: [facility] GI LAB; Service:     LITHOTRIPSY      ID ESOPHAGOGASTRODUODENOSCOPY TRANSORAL DIAGNOSTIC N/A 11/20/2018    Procedure: ESOPHAGOGASTRODUODENOSCOPY (EGD); Surgeon: Dax Torres MD;  Location: [facility] GI LAB; Service: Gastroenterology    TONSILLECTOMY      VEIN LIGATION AND STRIPPING Bilateral     1980's       Summary   Pt presented with frequent coughing on foods (not liquids) and VFSS/Videofluoroscopic Swallowing study is recommended at this time  Additional recommendations to follow ( eg re: diet, strategies etc)        Current Medical Status  Pt is a 78-year-old male with H/O alpha-1 antitrypsin deficiency, central lobular emphysema on 3 L home O2, Parkinson's disease, HTN presents to the ED with 3 days H/O worsening SOB with increased cough  DX:  -Acute exacerbation of severe COPD slight improvement  Persistent nonproductive cough  This may be due to acute bronchitis  Possibly could be due to some swallow dysfunction Parkinson's disease  -Alpha 1 antitrypsin deficiency which she is on weekly replacement therapy  -Chronic hypoxemic respiratory failure--GERD  -Depression    SLP Swallowing Evaluation ordered last pm and completed bedside this am     Current Precautions:  Fall, Aspiration     Allergies:  No known food allergies    Past medical history:  Please see H&P for details    Special Studies:  6/5 CXR: No acute cardiopulmonary disease  VBS/VFSS ordered in fall of 2021 but not completed  Social/Education/Vocational Hx:  Pt lives alone in [facility]  Dtr resides nearby [facility] as well per pt)  Swallow Information   Current Risks for Dysphagia & Aspiration: PD  Current Symptoms/Concerns: current respiratory symptoms  Current Diet: regular diet and thin liquids   Baseline Diet: regular diet and thin liquids      Baseline Assessment   Behavior/Cognition: alert  Speech/Language Status: able to participate in conversation  No hypophonia  Patient Positioning: upright at edge of bed  Pain Status/Interventions/Response to Interventions: No report of or nonverbal indications of pain  Swallow Mechanism Exam  Facial: symmetrical  Labial: WFL  Lingual: WFL  Velum: symmetrical  Mandible: adequate ROM  Dentition: edentulous and full dentures  Vocal quality:clear/adequate   Volitional Cough: strong/productive   Respiratory Status: on 3L O2        Consistencies Assessed and Performance   Materials administered included puree (farina), soft moist (banana), soft solid (omelet), ensure and coffee    Oral Stage:   Mastication appeared adequate with the materials administered today  Bolus formation and transfer were rapid at times  Mild to no significant oral residue noted  No overt s/s reduced oral control  Pharyngeal Stage: S/S suggestive of imp[airment  Swallow Mechanics:  Swallowing initiation appeared prompt  Laryngeal rise was palpated and judged to be within functional limits  Mod frequent cough with omelet ("it went down the wrong pipe" per pt)  followed by occasions of cough with puree/banana  No immediate cough with liquids    Esophageal Concerns: none reported   PS Further review of Radiology studies revealed that an Esophagram was compled 1/28/20 and revealed:  1  Mild esophageal dysmotility  2   Moderate gastroesophageal reflux without evidence for mild reflux esophagitis  3   Small hiatal hernia      Strategies and Efficacy: pt required frequent cues to slow rate and to not feed self additional bites while still coughing      Summary and Recommendations (see above)    Results Reviewed with: patient and RN     Treatment Recommended: as indicated by VBS    Patient Stated Goal: did not state this am    Dysphagia LTG  -Patient will demonstrate safe and effective oral intake (without overt s/s significant oral/pharyngeal dysphagia including s/s penetration or aspiration) for the highest appropriate diet level      -Patient will comply with a Video/Modified Barium Swallow study for more complete assessment of swallowing anatomy/physiology/aspiration risk and to assess efficacy of treatment techniques so as to best guide treatment plan    Thank you for this referral   Please do not hesitate to contact me with any questions or concerns      Georgianna Brunner [facility]   Speech Pathologist  [place] License [id number]  [place] License [id number]  Available via [facility] Text

## 2022-06-08 NOTE — CONSULTS
[address]   Neurology Initial Consult    Tawnya Sahu is a 65 y o  male  3 [facility]/3 [facility]-*          Information obtained from:   Chief Complaint   Patient presents with    Shortness of Breath     Pt reports chronic SOB worse X 2 days  Lungs dim with exp wheeze  Pt on chronic 3L O2 NC  Assessment/Plan:    1  Exacerbation of COPD  2  Parkinson Disease  3  Dysphagia  4  Gait dysfunction  5  Dysautonomia    -Fall Rish  - Continue with carbidopa levodopa  tid, can consider increase in the OP setting when medically improved  -continue on midrodrine 10mg tid   -continue with speech therapy rel dysphagia  -continue with PT re: gait training   -can consider [address] when medically stable to go for Balance and Gait, poss PD Boxing program if able to tolerate it    -abx and steroids per medical team and pulmonary input    Pt is 69yo male with recent diagnosis of PD had been seen in [address] Neurology office with reports of difficulty swallowing and sialorrhea  He was noted to have additional signs of PD and was started on carbidopa levodopa  Since this visit pt has had an exacerbation of his pulmonary disease and was brought to the ED and subsequently admitted for IV steroids and Pulm treatment  Pt was evaluated by PCP team and sent for ST who did VFSS shwing some mild oropharyngeal dysphagia  Recommendations made  Pt has had ongoing issues with imbalance and reports no falls recently  He ambulates with a cane for stability  Pt reports LLE weaker than RLE due to Lumbar surgery in the past and also has a decrease in sensation which causes him some mild imbalance which has been there for 15yr  Pt has note increase in weakness and instability during this admission and with swallowing deficits and imbalances there was some concern re; PD involvement  Pt is being treated by ST and should continue this upon his DC    He had PT evaluation noting mild imbalance but noted decreased strength, endurance and mobility  Likely related to COPD exacerbation  On exam pt with diminished sensation of the LLE and some mild gait instability  Positive romberg testing with reports of mild lightheadedness  Pt on midodrine but if persist may need orthostatic vitals and adjustments  Recommending pt continue with formal ST inpt and in the OP setting  Continue on current carbidopa dosing and can follow up in OP Neurology office when feeling better for adjustment if needed  Pt should continue with PT, should have formal OP PT when medically stable to do so with the [address] [place] [address] and poss enroll to the boxing program if able to tolerate it  [place] will need follow up in in 3 months with general attending or advance practitioner  He will not require outpatient neurological testing  Pt has a follow up with Dr Leonides Ochoa in September, can maintain that appointment  HPI:  Luis Gan is a 71yr old male with PMH of COPD, HTN, IBS, heart disease and PD who was brought to the ED with reports of worsening SOB and cough  Pt had worsening dyspnea x 2 days and started to feel like he was going to pass out  Pt with dry non productive cough on arrival with tachypnea and wheezing  Pt was admitted for nebs and steroids  Pt has since had issues with swallowing and had a VFSS completed for eval   Pt has also had some balance issues which raised concerns re: this PD  Pt showed mild oropharyngeal dysphagia w/reduced oral control, weak airway rise and incomplete epiglottic inversion with food>liquid retention  Recommendations dysphagia therapy in the OP setting with speech therapy and increase effort of swallow and allow slower rate of time to eat  Neurology consult requested to eval pt for poss  PD related changes causing an increased issue with pt swallowing difficulties as well as imbalanced      Review of prior office eval in May 2022, pt reported swallowing difficulty and sialorrhea as well as some limted gait 2nd to SOB  Pt reports he has also had Lumbar disc surgery that has left his LLE weaker with decreased sensation for the past 15yrs  Pt was encouraged to do PT and appears to have been receiving intermittent homecare PT  Pt had reported lightheadedness in the past and has had midodrine added for orthostatic hypotension and dysautonomia  Neurological exam with some diminished sensation in the LLE and slight increase in weakness to this leg  He has global weakness in general from acute illness however LE greater than the left  Pt speech is clear with some hypophonia, although pt does not feel his voice has changed  He has difficulty with swallowing and ongoing issues with constipation  He is noted to have mixed action tremors, some of which worse with this acute illness  Pt reports he had occasional imbalance due to PD and his prior back surgery  Noted that he was to do PT but was advised he should not drive until he was seen in May and released to be able to do short distances to get to his [address]  Pt had mild rigidity to the RLE quad muscle group, UE limber  Pt ambulated with a cane with fair stride but forward flexion with a cane  Pt gait was mildly unsteady, but appeared to be hurried  Pt reports having some mild lightheadedness with standing at baseline, he is on midodrine  Pt is on carbidopa levodopa tid and feels that he has had some improvement with this addition to his regimen  Would continue with PT and speech therapy to continue working with pt deficits  Consider [address] for PT program once medically stable to be released to home  Can consider boxing program   Continue with carbidopa tid for now, can consider changes in OP setting when pt acute illness improves          Past Medical History:   Diagnosis Date    Anesthesia complication     Difficult to wake up    Arthritis     BPH (benign prostatic hyperplasia)     urinary frequency    Cancer ([facility] )     basal cell neck, face    COPD (chronic obstructive pulmonary disease) (HCC)     COPD exacerbation (HCC) 12/29/2019    Elevated PSA 10/25/2018    Full dentures     Hiatal hernia     History of methicillin resistant staphylococcus aureus (MRSA)     10/11/2018 MRSA (nares) positive    Hypertension     Irritable bowel syndrome     Kidney stone     at least 7 episodes    Liver disease     Alpha 1- enzyme deficiency - diagnosed 2002  has been on weekly replacement therapy since then    Pulmonary emphysema ([facility] )     1/25/15  FEV1 - 2 45 liters or 59% of predicted    RSV infection 12/2017    Wears glasses     for driving only       Past Surgical History:   Procedure Laterality Date    BACK SURGERY  2008    discectomy    CARDIAC CATHETERIZATION N/A 5/3/2022    Procedure: Cardiac catheterization both left and right heart catheterization with vaso dilatory trial;  Surgeon: Antwan Guajardo MD;  Location: [place] [address] CATH LAB; Service: Cardiology    CARDIAC CATHETERIZATION Left 5/3/2022    Procedure: Cardiac Left Heart Cath;  Surgeon: Antwan Guajardo MD;  Location: [place] [address] CATH LAB; Service: Cardiology    CARDIAC CATHETERIZATION Left 5/3/2022    Procedure: Cardiac Left Ventriculogram;  Surgeon: Antwan Guajardo MD;  Location: [place] [address] CATH LAB; Service: Cardiology    COLONOSCOPY      COLONOSCOPY N/A 3/10/2017    Procedure: Kristina Joshi;  Surgeon: Dax Torres MD;  Location: [place] [address] GI LAB; Service:    [facility]      removal of kidney stones    ESOPHAGOGASTRODUODENOSCOPY N/A 3/10/2017    Procedure: ESOPHAGOGASTRODUODENOSCOPY (EGD); Surgeon: Dax Torres MD;  Location: [facility] GI LAB; Service:     LITHOTRIPSY      [place] ESOPHAGOGASTRODUODENOSCOPY TRANSORAL DIAGNOSTIC N/A 11/20/2018    Procedure: ESOPHAGOGASTRODUODENOSCOPY (EGD); Surgeon: Dax Torres MD;  Location: [facility] GI LAB;   Service: Gastroenterology    TONSILLECTOMY      VEIN LIGATION AND STRIPPING Bilateral     1980's Allergies   Allergen Reactions    Chantix [Varenicline]      Caused prostate infection         Current Facility-Administered Medications:     acetaminophen (TYLENOL) tablet 650 mg, 650 mg, Oral, Q6H PRN, Sima Telles MD    amLODIPine (NORVASC) tablet 10 mg, 10 mg, Oral, Daily, Sima Telles MD, 10 mg at 06/08/22 3019    budesonide (PULMICORT) inhalation solution 0 5 mg, 0 5 mg, Nebulization, BID, Sima Telles MD, 0 5 mg at 06/08/22 8524    busPIRone (BUSPAR) tablet 10 mg, 10 mg, Oral, TID, Sima Telles MD, 10 mg at 06/08/22 5665    carbidopa-levodopa (SINEMET)  mg per tablet 1 tablet, 1 tablet, Oral, TID, Sima Telles MD, 1 tablet at 06/08/22 2857    cholestyramine sugar free (QUESTRAN LIGHT) packet 4 g, 4 g, Oral, Daily, Sima Telles MD, 4 g at 06/08/22 0925    enoxaparin (LOVENOX) subcutaneous injection 40 mg, 40 mg, Subcutaneous, Daily, Sima Telles MD, 40 mg at 06/08/22 6963    finasteride (PROSCAR) tablet 5 mg, 5 mg, Oral, QAM, Sima Telles MD, 5 mg at 06/08/22 1168    fluticasone (FLONASE) 50 mcg/act nasal spray 2 spray, 2 spray, Nasal, Daily, Sima Telles MD, 2 spray at 06/08/22 0979    gabapentin (NEURONTIN) capsule 300 mg, 300 mg, Oral, TID, Sima Telles MD, 300 mg at 06/08/22 5484    guaiFENesin (ROBITUSSIN) oral solution 200 mg, 200 mg, Oral, Q4H, [address] MD Gerry, 200 mg at 06/08/22 1448    ipratropium-albuterol (DUO-NEB) 0 5-2 5 mg/3 mL inhalation solution 3 mL, 3 mL, Nebulization, 4x Daily, Sima Telles MD, 3 mL at 06/08/22 1518    ipratropium-albuterol (DUO-NEB) 0 5-2 5 mg/3 mL inhalation solution 3 mL, 3 mL, Nebulization, Q2H PRN, [address] MD Gerry, 3 mL at 06/07/22 1407    melatonin tablet 3 mg, 3 mg, Oral, HS, Louise Guzman DO, 3 mg at 06/07/22 2103    methylPREDNISolone sodium succinate (Solu-MEDROL) injection 20 mg, 20 mg, Intravenous, Q8H [facility], [address] Garrett MD, 20 mg at 22 1448    metoprolol tartrate (LOPRESSOR) tablet 25 mg, 25 mg, Oral, Q12H [facility], Taffy Lefort, MD, 25 mg at 22 0924    midodrine (PROAMATINE) tablet 10 mg, 10 mg, Oral, TID AC, Taffy Lefort, MD, 10 mg at 22 0630    mirtazapine (REMERON) tablet 30 mg, 30 mg, Oral, HS, Taffy Lefort, MD, 30 mg at 22 2103    ondansetron (ZOFRAN-ODT) dispersible tablet 4 mg, 4 mg, Oral, Q6H PRN, [address], MD, 4 mg at 22 1402    pantoprazole (PROTONIX) EC tablet 40 mg, 40 mg, Oral, BID, Taffy Lefort, MD, 40 mg at 22 5182    tamsulosin (FLOMAX) capsule 0 4 mg, 0 4 mg, Oral, Daily With Sherrie Jackman DO, 0 4 mg at 22 1708    Social History     Socioeconomic History    Marital status:      Spouse name: Not on file    Number of children: Not on file    Years of education: Not on file    Highest education level: Not on file   Occupational History    Not on file   Tobacco Use    Smoking status: Former Smoker     Packs/day: 1 00     Years: 60 00     Pack years: 60 00     Quit date: 2017     Years since quittin 7    Smokeless tobacco: Never Used    Tobacco comment: quit in 2017   Vaping Use    Vaping Use: Never used   Substance and Sexual Activity    Alcohol use: Never     Comment: stopped in     Drug use: Not Currently    Sexual activity: Not on file   Other Topics Concern    Not on file   Social History Narrative    Patient is   He has three adult children; 1 daughter and 2 sons  His daughter Bennie Fregoso lives closest [place] and is very involved w/ his care  Patient is not Ep[employer]alian  He lives alone       Social Determinants of Health     Financial Resource Strain: Not on file   Food Insecurity: No Food Insecurity    Worried About Running Out of Food in the Last Year: Never true    Vi of Food in the Last Year: Never true   Transportation Needs: No Transportation Needs    Lack of Transportation (Medical): No    Lack of Transportation (Non-Medical): No   Physical Activity: Not on file   Stress: Not on file   Social Connections: Not on file   Intimate Partner Violence: Not on file   Housing Stability: Low Risk     Unable to Pay for Housing in the Last Year: No    Number of Places Lived in the Last Year: 1    Unstable Housing in the Last Year: No       Family History   Problem Relation Age of Onset    Emphysema Mother         never smoked    Emphysema Father     Cancer Brother         colon    Colon cancer Brother     Ulcerative colitis Family     Liver disease Family          Review of systems:  Please see HPI for positive symptoms  Constitutional: No fever, no chills, no weight change  Ocular: No diplopia, no blurred vision, spots/zigzag lines  HEENT:  No sore throat, headache or congestion  COR:  No chest pain  No palpitations  Lungs:  + sob, + cough  GI:  no  nausea, no vomiting, no diarrhea, no constipation, no anorexia  :  No dysuria, frequency, or urgency  No hematuria  Musculoskeletal:  No joint pain or swelling   +LE fatigue  +LLE weakness 2nd to prior Lumbar surgery  Skin:  No rash or itching  Psychiatric:  no anxiety, no depression  Endocrine:  No polyuria or polydipsia  Physical examination:  /64 (BP Location: Right arm)   Pulse 68   Temp 98 5 °F (36 9 °C) (Oral)   Resp 21   Ht 6' 5" (1 956 m)   Wt 86 2 kg (190 lb)   SpO2 96%   BMI 22 53 kg/m²     GENERAL APPEARANCE:  The patient is alert, oriented  HEENT:  Head is normocephalic  Pupils are equal and reactive  NECK:  Supple without lymphadenopathy  HEART:  Regular rate and rhythm  LUNGS:  Audible wheezing with cough  ABDOMEN:  Soft, nontender, nondistended with good bowel sounds heard  EXTREMITIES:  Without cyanosis, clubbing or edema  Mental status:   The patient is alert, attentive, and oriented  Speech is clear and fluent, good repetition, comprehension, and naming  Cranial nerves:  CN II: Visual fields are full to confrontation  Fundoscopic exam is normal with sharp discs and no vascular changes  Pupils are 3 mm and briskly reactive to light  CN III, IV, VI: At primary gaze, there is no eye deviation  CN V: Facial sensation is intact to pinprick in all 3 divisions bilaterally  Corneal responses are intact  CN VII: Face is symmetric with normal eye closure and smile  CN VIII: Hearing is normal to rubbing fingers  CN IX, X: Palate elevates symmetrically  Phonation with mild hypophonia  CN XI: Head turning and shoulder shrug are intact  CN XII: Tongue is midline with normal movements and no atrophy  Motor: There is no pronator drift of out-stretched arms  Muscle bulk and tone are normal    Pt motor exam is limited 2nd to   Muscle exam  Arm Right Left Leg Right Left   Deltoid 5/5 5/5 Iliopsoas 4+/5 4/5   Biceps 5/5 5/5 Quads 4+/5 4/5   Triceps 5/5 5/5 Hamstrings 4+/5 4/5   Wrist Extension 5/5 5/5 Ankle Dorsi Flexion 4+/5 4+/5   Wrist Flexion 5/5 5/5 Ankle Plantar Flexion 4+/5 4+/5        Reflexes    RJ BJ TJ KJ AJ Plantars Chavez's   Right 2+ 2+ 2+ 2+ 2+ Downgoing Not present   Left 2+ 2+ 2+ 2+ 2+ Downgoing Not present      Sensory:  Light touch, Temperature, position sense, and vibration sense are intact in fingers and Rt toes  Has diminished sensation to the LLE  Coordination:  Rapid alternating movements and fine finger movements are intact  There is no dysmetria on finger-to-nose and heel-knee-shin  There are + resting and action tremor   Romberg +  Gait/Stance:  Gait with Forward flexion, slightly shorter stride with minimal unsteadiness  Uses cane for stability    Deferred heel toe and tandem gait    Lab Results   Component Value Date    WBC 11 63 (H) 06/08/2022    HGB 11 9 (L) 06/08/2022    HCT 35 6 (L) 06/08/2022    MCV 95 06/08/2022     06/08/2022     Lab Results   Component Value Date    HGBA1C 5 2 08/29/2019     Lab Results   Component Value Date    ALT 16 06/05/2022    AST 27 06/05/2022    ALKPHOS 77 06/05/2022    BILITOT 1 0 09/27/2016     Lab Results   Component Value Date    GLUCOSE 89 09/27/2016    CALCIUM 8 9 06/08/2022     09/27/2016    K 4 7 06/08/2022    CO2 25 06/08/2022     06/08/2022    BUN 33 (H) 06/08/2022    CREATININE 1 13 06/08/2022         Review of reports and notes reveal:  Independent Interpretation of images or specimens:  XR chest 1 view portable    Result Date: 6/6/2022  No acute cardiopulmonary disease  Workstation performed: [id number]           Thank you for this consult  Total time of encounter: 70 Minutes  More than 50% of time was spent in counseling and coordination of care of patient

## 2022-06-08 NOTE — PROGRESS NOTES
Progress Note - Pulmonary   Jeff Castro 65 y o  male MRN: [id number]  Unit/Bed#: [address] [place] [address] Encounter: [id number]    Assessment:  Acute exacerbation of severe COPD slight improvement  Persistent nonproductive cough  This may be due to acute bronchitis  Possibly could be due to some swallow dysfunction Parkinson's disease  Alpha 1 antitrypsin deficiency which she is on weekly replacement therapy  Chronic hypoxemic respiratory failure    Plan:  Solu-Medrol has been decreased to 40 mg IV every 8 hours  We will ask speech therapy to evaluate patient  If cough does not improve could consider empiric p o  antibiotic therapy with Ceftin or PO azithromycin  Continue neb treatments with DuoNeb    Subjective:   Still has dry intermittent cough  Short of breath with walking  Prep minimal improvement    Objective:     Vitals: Blood pressure 136/65, pulse 63, temperature 97 8 °F (36 6 °C), temperature source Axillary, resp  rate 20, height 6' 5" (1 956 m), weight 86 2 kg (190 lb), SpO2 97 %  ,Body mass index is 22 53 kg/m²  Intake/Output Summary (Last 24 hours) at 6/7/2022 2039  Last data filed at 6/7/2022 1001  Gross per 24 hour   Intake 390 ml   Output 433 ml   Net -43 ml       Physical Exam: Physical Exam  Vitals reviewed  Constitutional:       General: He is not in acute distress  Appearance: He is well-developed  Comments: On 3 liters/minute nasal cannula oxygen O2 saturation is 97%   HENT:      Head: Normocephalic  Nose: Nose normal       Mouth/Throat:      Pharynx: Oropharynx is clear  No oropharyngeal exudate  Eyes:      Conjunctiva/sclera: Conjunctivae normal       Pupils: Pupils are equal, round, and reactive to light  Neck:      Vascular: No JVD  Trachea: No tracheal deviation  Cardiovascular:      Rate and Rhythm: Normal rate and regular rhythm  Heart sounds: Normal heart sounds  Pulmonary:      Effort: Pulmonary effort is normal       Comments:  There are a few faint expiratory wheezes in both lower lobes  Abdominal:      General: There is no distension  Palpations: Abdomen is soft  Tenderness: There is no abdominal tenderness  There is no guarding  Musculoskeletal:      Cervical back: Neck supple  Comments: No edema, cyanosis or clubbing   Lymphadenopathy:      Cervical: No cervical adenopathy  Skin:     General: Skin is warm and dry  Findings: No rash  Neurological:      General: No focal deficit present  Mental Status: He is alert and oriented to person, place, and time  Psychiatric:         Behavior: Behavior normal          Thought Content: Thought content normal           Labs: I have personally reviewed pertinent lab results  , ABG:   Lab Results   Component Value Date    PHART 7 430 04/28/2022    RBV6JIS 41 4 04/28/2022    PO2ART 171 2 (H) 04/28/2022    [id number] 26 9 04/28/2022    BEART 2 3 04/28/2022    SOURCE Radial, Right 04/28/2022   , BNP: No results found for: BNP, CBC:   Lab Results   Component Value Date    WBC 10 07 06/07/2022    HGB 11 4 (L) 06/07/2022    HCT 33 6 (L) 06/07/2022    MCV 94 06/07/2022     06/07/2022    MCH 31 8 06/07/2022    MCHC 33 9 06/07/2022    RDW 14 6 06/07/2022    MPV 9 3 06/07/2022    NRBC 0 06/07/2022   , CMP:   Lab Results   Component Value Date     09/27/2016    K 4 7 06/07/2022    K 4 2 09/27/2016     06/07/2022     09/27/2016    CO2 26 06/07/2022    CO2 23 09/27/2016    BUN 28 (H) 06/07/2022    BUN 14 09/27/2016    CREATININE 1 17 06/07/2022    CREATININE 1 09 09/27/2016    GLUCOSE 89 09/27/2016    CALCIUM 8 7 06/07/2022    CALCIUM 9 3 09/27/2016    AST 27 06/05/2022    AST 18 09/27/2016    ALT 16 06/05/2022    ALT 16 09/27/2016    ALKPHOS 77 06/05/2022    ALKPHOS 57 09/27/2016    PROT 6 9 09/27/2016    BILITOT 1 0 09/27/2016    EGFR 59 06/07/2022   , PT/INR:   Lab Results   Component Value Date    INR 1 10 04/28/2022   , Troponin: No results found for: TROPONIN    Imaging and other studies: I have personally reviewed pertinent reports     and I have personally reviewed pertinent films in PACS

## 2022-06-08 NOTE — SPEECH THERAPY NOTE
F/U to VFSS completed  HEP (Home Exercise Program) prepared and provided to pt at bedside  I reviewed impressions and recommendations  I also introduced strengthening exercises (including Grant and completed multiple repetitions of this exercise for increasing length of time from 5-8-10 seconds per repetition)  I discussed recommendation for continued therapy and patient in agreement with the same  He stated plan to d/c to "a therapy center    Hopefully where I was before in [place] ([address] SNF/rehab center I believe)     Carmen Ackerman [facility] , [address] [phone]

## 2022-06-08 NOTE — PROCEDURES
Speech Pathology Videofluoroscopic Swallow Study (VFSS/VBSS/MBSS)      Patient Name: Brian MOROCHOWE'DARBY Date: 6/8/2022     Problem List  Principal Problem:    COPD with acute exacerbation ([facility] )  Active Problems:    Alpha-1-antitrypsin deficiency ([facility] )    Gastroesophageal reflux disease    Essential hypertension    Acute and chronic respiratory failure with hypoxia ([facility])    BPH (benign prostatic hyperplasia)    Insomnia    Ambulatory dysfunction    Peripheral neuropathy    Depression, recurrent ([facility] )    Parkinson's disease ([facility] )      Past Medical History  Past Medical History:   Diagnosis Date    Anesthesia complication     Difficult to wake up    Arthritis     BPH (benign prostatic hyperplasia)     urinary frequency    Cancer ([place] [address] )     basal cell neck, face    COPD (chronic obstructive pulmonary disease) ([facility])     COPD exacerbation ([place] [address] ) 12/29/2019    Elevated PSA 10/25/2018    Full dentures     Hiatal hernia     History of methicillin resistant staphylococcus aureus (MRSA)     10/11/2018 MRSA (nares) positive    Hypertension     Irritable bowel syndrome     Kidney stone     at least 7 episodes    Liver disease     Alpha 1- enzyme deficiency - diagnosed 2002  has been on weekly replacement therapy since then    Pulmonary emphysema ([facility] )     1/25/15  FEV1 - 2 45 liters or 59% of predicted    RSV infection 12/2017    Wears glasses     for driving only     General Information; Bedside swallowing evaluation was completed this a m     Significant coughing episodes were noted with intake of food but not with liquid  Patient reported that went down the wrong pipe "      A VFSS was recommended to assess current oropharyngeal stage swallowing skills   He was viewed in lateral position and was given trials nectar thick liquid, thin liquid, puree/applesauce, soft moist food (canned peaches), solid food (Hattie Doone cookie), a mixed consistency item (fruit with thin liquid) and a13mm pill with thin liquid  Oral stage: At least mild oral stage dysphagia  Mastication was timely and grossly effective with materials administered today  Bolus formation and transfer were mildly impaired  Mild oral residue was noted with both soft and solid food  Oral control was impaired as evidenced by deep spillage over the base of tongue noted with peaches as well as with mixed food and liquid  Mild difficulty in transferring the pill was also noted (multiple trials/sips were required to successfully transfer the pill from the mouth of the pharynx)  Pharyngeal stage: At least mild pharyngeal dysphagia as well    Velar elevation noted  Swallowing initiation was generally prompt to minimally delayed  Hyolarygneal excursion appeared to be mild to moderately weak     Some deflection of the base of the epiglottis was noted but no inversion of the "tip" of the epiglottis was present  Tongue base retraction appeared to be at least mildly weak       PES opening appeared adequate  Management of food/liquid/barium tablet follows: With both nectar thick and thin liquid:  Transient penetration during the swallow and trace vallecular residue were noted  The minor residue spilled but no aspiration occurred today  With pureed food:  Mild residue in the vallecula and in the hypopharynx was noted after the primary swallow  Neck flexion did not eliminate residue  Secondary swallow did reduce but not eliminate all residue  With soft food:  Deep spillage down the aryepiglottic folds was noted  With mild oral and vallecular residue necessitated 2nd and 3rd swallows  No aspiration occurred  With solid food:  Mild-to-moderate vallecular residue was noted  Additional swallows again reduced but did not eliminate all residue    When liquids were used to wash down food residue, food and liquid residue persisted in the vallecula and spilled over time with mild laryngeal penetration and trace aspiration (of small amount) resulting  With food mixed with liquid:  Deep spillage to the piriform sinuses and transient penetration into the larynx was noted with the primary swallow  Mild vallecular residue noted and spilled over time with risk for overflow aspiration, but none occurred today  Strategies and Efficacy: see above    Aspiration Response and Efficacy:  He evidenced only trace penetration and minimal aspiration during the study today with mild throat clearing noted  However, he reported that food went down the wrong pipe during his bedside evaluation today and strong ongoing cough was noted at that time  Esophageal stage:  Brief view of esophagus was completed after administration of the barium tablet and no gross pill stasis was identified  Assessment Summary:    Pt presents with at least mild oropharyngeal dysphagia characterized primarily by reduced oral control, weak airway rise, incomplete epiglottic inversion with food>liquid retention in the vallecula with risk for episodes of overflow penetration into the larynx and overflow aspiration  Strategies reduced but did not eliminate all risk  Exercises to strengthen swallow could optimize airway protection and propulsion of boluses through the pharynx  Note: Images are available for review in PACS as desired  Recommendations:   Recommended Diet:  soft/level 3 diet and thin liquids   Recommended Form of Medications: as best tolerated (continue assessment)   Aspiration precautions and compensatory swallowing strategies:  1  Slow rate of feeding allowing time to use strategies  2  Multiple and effortful swallows to minimize food residue in the pharynx and risk for overflow  Consider referral to OP SLP Dysphagia therapy (train exercises and strategies to strengthen airway rise/protection and "driving" forces on boluses as well as to ensure strategy usage):     Results Reviewed with pt  Pt and caregivers would benefit from/require continued education      Patient Stated Goal: did not state    Dysphagia Goals per SLP:     Dysphagia LTG  -Patient will demonstrate safe and effective oral intake (without overt s/s significant oral/pharyngeal dysphagia including s/s penetration or aspiration) for the highest appropriate diet level  Short Term Goals:  -Pt will tolerate Dysphagia 3/advanced (dental soft) diet and thin liquid with no significant s/s oral or pharyngeal dysphagia across multiple (1-3) diagnostic session/s     -Patient will tolerate trials of upgraded food  texture with no significant s/s of oral or pharyngeal dysphagia including aspiration across 1-3 diagnostic sessions     -Patient will complete daily oral motor exercises to increase lingual strength, range of motion and coordination with min cues to 90% effectiveness to improve bolus formation, transfer and control with minimal/no cues needed    -Patient will complete exercises to increase laryngeal rise, airway protection and pharyngeal constriction for improved swallow function with minimal/ no cues needed      -Patient will complete Grant exercises for an increasing length of time over increasing number of trials with increased effort     -Patient will utilize trained compensatory strategies (eg   controlled bite/sip size & rate as he fed himself rapidly during am meal, multiple swallows) with 90% accuracy to eliminate overt s/s penetration/aspiration with the least restrictive food/liquid consistencies    -Patient will utilize trained swallowing maneuver (e g ,  effortful swallow, etc ) with 80%  to facilitate improved airway protection, tongue base retraction, pharyngeal constriction and/or clearance of the bolus through the pharynx) with 90% accuracy    -Patient will demonstrate independent use of recommended safe swallowing strategies during a clinician assessed meal across 1-3 diagnostic sessions      Thank you for this referral   Please do not hesitate to contact me with any questions or concerns      Lavonne Krueger [facility]   Speech Pathologist  [place] License [id number]  [place] License [id number]  Available via [address] [place] [address] Rd Text

## 2022-06-09 ENCOUNTER — HOME CARE VISIT (OUTPATIENT)
Dept: HOME HEALTH SERVICES | Facility: HOME HEALTHCARE | Age: 77
End: 2022-06-09
Payer: MEDICARE

## 2022-06-09 VITALS
TEMPERATURE: 97.8 F | DIASTOLIC BLOOD PRESSURE: 60 MMHG | OXYGEN SATURATION: 95 % | WEIGHT: 190 LBS | BODY MASS INDEX: 22.43 KG/M2 | HEIGHT: 77 IN | HEART RATE: 59 BPM | RESPIRATION RATE: 19 BRPM | SYSTOLIC BLOOD PRESSURE: 127 MMHG

## 2022-06-09 LAB
ANION GAP SERPL CALCULATED.3IONS-SCNC: 10 MMOL/L (ref 4–13)
BASO STIPL BLD QL SMEAR: PRESENT
BASOPHILS # BLD MANUAL: 0 THOUSAND/UL (ref 0–0.1)
BASOPHILS NFR MAR MANUAL: 0 % (ref 0–1)
BUN SERPL-MCNC: 38 MG/DL (ref 5–25)
CALCIUM SERPL-MCNC: 8.8 MG/DL (ref 8.3–10.1)
CHLORIDE SERPL-SCNC: 103 MMOL/L (ref 100–108)
CO2 SERPL-SCNC: 26 MMOL/L (ref 21–32)
CREAT SERPL-MCNC: 1.11 MG/DL (ref 0.6–1.3)
EOSINOPHIL # BLD MANUAL: 0 THOUSAND/UL (ref 0–0.4)
EOSINOPHIL NFR BLD MANUAL: 0 % (ref 0–6)
ERYTHROCYTE [DISTWIDTH] IN BLOOD BY AUTOMATED COUNT: 14.8 % (ref 11.6–15.1)
GFR SERPL CREATININE-BSD FRML MDRD: 63 ML/MIN/1.73SQ M
GLUCOSE SERPL-MCNC: 118 MG/DL (ref 65–140)
HCT VFR BLD AUTO: 36.1 % (ref 36.5–49.3)
HGB BLD-MCNC: 12.2 G/DL (ref 12–17)
LYMPHOCYTES # BLD AUTO: 3.81 THOUSAND/UL (ref 0.6–4.47)
LYMPHOCYTES # BLD AUTO: 30 % (ref 14–44)
MCH RBC QN AUTO: 31.9 PG (ref 26.8–34.3)
MCHC RBC AUTO-ENTMCNC: 33.8 G/DL (ref 31.4–37.4)
MCV RBC AUTO: 95 FL (ref 82–98)
MONOCYTES # BLD AUTO: 0.51 THOUSAND/UL (ref 0–1.22)
MONOCYTES NFR BLD: 4 % (ref 4–12)
MYELOCYTES NFR BLD MANUAL: 1 % (ref 0–1)
NEUTROPHILS # BLD MANUAL: 7.99 THOUSAND/UL (ref 1.85–7.62)
NEUTS BAND NFR BLD MANUAL: 2 % (ref 0–8)
NEUTS HYPERSEG BLD QL SMEAR: PRESENT
NEUTS SEG NFR BLD AUTO: 61 % (ref 43–75)
NRBC BLD AUTO-RTO: 1 /100 WBC (ref 0–2)
PLATELET # BLD AUTO: 167 THOUSANDS/UL (ref 149–390)
PLATELET BLD QL SMEAR: ADEQUATE
PMV BLD AUTO: 9.4 FL (ref 8.9–12.7)
POLYCHROMASIA BLD QL SMEAR: PRESENT
POTASSIUM SERPL-SCNC: 4.7 MMOL/L (ref 3.5–5.3)
RBC # BLD AUTO: 3.82 MILLION/UL (ref 3.88–5.62)
RBC MORPH BLD: PRESENT
SMUDGE CELLS BLD QL SMEAR: PRESENT
SODIUM SERPL-SCNC: 139 MMOL/L (ref 136–145)
VARIANT LYMPHS # BLD AUTO: 2 %
WBC # BLD AUTO: 12.69 THOUSAND/UL (ref 4.31–10.16)

## 2022-06-09 PROCEDURE — 85027 COMPLETE CBC AUTOMATED: CPT

## 2022-06-09 PROCEDURE — 94760 N-INVAS EAR/PLS OXIMETRY 1: CPT

## 2022-06-09 PROCEDURE — NC001 PR NO CHARGE: Performed by: FAMILY MEDICINE

## 2022-06-09 PROCEDURE — 94640 AIRWAY INHALATION TREATMENT: CPT

## 2022-06-09 PROCEDURE — 99238 HOSP IP/OBS DSCHRG MGMT 30/<: CPT | Performed by: FAMILY MEDICINE

## 2022-06-09 PROCEDURE — 97530 THERAPEUTIC ACTIVITIES: CPT

## 2022-06-09 PROCEDURE — 85007 BL SMEAR W/DIFF WBC COUNT: CPT

## 2022-06-09 PROCEDURE — 97535 SELF CARE MNGMENT TRAINING: CPT

## 2022-06-09 PROCEDURE — 80048 BASIC METABOLIC PNL TOTAL CA: CPT

## 2022-06-09 RX ORDER — SIMETHICONE 80 MG
80 TABLET,CHEWABLE ORAL ONCE
Status: COMPLETED | OUTPATIENT
Start: 2022-06-09 | End: 2022-06-09

## 2022-06-09 RX ORDER — SIMETHICONE 80 MG
80 TABLET,CHEWABLE ORAL EVERY 6 HOURS PRN
Status: DISCONTINUED | OUTPATIENT
Start: 2022-06-09 | End: 2022-06-09 | Stop reason: HOSPADM

## 2022-06-09 RX ORDER — PREDNISONE 10 MG/1
TABLET ORAL
Qty: 10 TABLET | Refills: 0 | Status: SHIPPED | OUTPATIENT
Start: 2022-06-09 | End: 2022-06-13

## 2022-06-09 RX ADMIN — PANTOPRAZOLE SODIUM 40 MG: 40 TABLET, DELAYED RELEASE ORAL at 08:51

## 2022-06-09 RX ADMIN — GABAPENTIN 300 MG: 300 CAPSULE ORAL at 08:52

## 2022-06-09 RX ADMIN — GUAIFENESIN 200 MG: 100 SOLUTION ORAL at 02:30

## 2022-06-09 RX ADMIN — BUDESONIDE 0.5 MG: 0.5 INHALANT ORAL at 07:14

## 2022-06-09 RX ADMIN — FINASTERIDE 5 MG: 5 TABLET, FILM COATED ORAL at 08:52

## 2022-06-09 RX ADMIN — MIDODRINE HYDROCHLORIDE 10 MG: 5 TABLET ORAL at 12:19

## 2022-06-09 RX ADMIN — CHOLESTYRAMINE 4 G: 4 POWDER, FOR SUSPENSION ORAL at 08:51

## 2022-06-09 RX ADMIN — SIMETHICONE 80 MG: 80 TABLET, CHEWABLE ORAL at 08:51

## 2022-06-09 RX ADMIN — GUAIFENESIN 200 MG: 100 SOLUTION ORAL at 05:07

## 2022-06-09 RX ADMIN — IPRATROPIUM BROMIDE AND ALBUTEROL SULFATE 3 ML: 2.5; .5 SOLUTION RESPIRATORY (INHALATION) at 11:20

## 2022-06-09 RX ADMIN — IPRATROPIUM BROMIDE AND ALBUTEROL SULFATE 3 ML: 2.5; .5 SOLUTION RESPIRATORY (INHALATION) at 07:14

## 2022-06-09 RX ADMIN — METHYLPREDNISOLONE SODIUM SUCCINATE 20 MG: 40 INJECTION, POWDER, FOR SOLUTION INTRAMUSCULAR; INTRAVENOUS at 05:07

## 2022-06-09 RX ADMIN — ENOXAPARIN SODIUM 40 MG: 40 INJECTION SUBCUTANEOUS at 08:52

## 2022-06-09 RX ADMIN — BUSPIRONE HYDROCHLORIDE 10 MG: 5 TABLET ORAL at 08:51

## 2022-06-09 RX ADMIN — FLUTICASONE PROPIONATE 2 SPRAY: 50 SPRAY, METERED NASAL at 08:51

## 2022-06-09 RX ADMIN — METOPROLOL TARTRATE 25 MG: 25 TABLET, FILM COATED ORAL at 08:51

## 2022-06-09 RX ADMIN — AMLODIPINE BESYLATE 10 MG: 5 TABLET ORAL at 08:52

## 2022-06-09 RX ADMIN — CARBIDOPA AND LEVODOPA 1 TABLET: 25; 100 TABLET ORAL at 08:52

## 2022-06-09 RX ADMIN — GUAIFENESIN 200 MG: 100 SOLUTION ORAL at 09:00

## 2022-06-09 NOTE — OCCUPATIONAL THERAPY NOTE
OT TREATMENT       06/09/22 1145   Note Type   Note Type Treatment   Restrictions/Precautions   Other Precautions Fall Risk;O2   Pain Assessment   Pain Assessment Tool 0-10   Pain Score No Pain   ADL   Eating Assistance 7  Independent   Grooming Assistance 5  Supervision/Setup   UB Bathing Assistance 5  Supervision/Setup   LB Bathing Assistance 3  Moderate Assistance   UB Dressing Assistance 5  Supervision/Setup   LB Dressing Assistance 4  Minimal Assistance   Toileting Assistance  4  Minimal Assistance   Bed Mobility   Supine to Sit 5  Supervision   Sit to Supine 5  Supervision   Transfers   Sit to Stand 4  Minimal assistance   Stand to Sit 4  Minimal assistance   Functional Mobility   Functional Mobility 4  Minimal assistance   Additional Comments 25 feet with 1 standing rest break with use of RW   Additional items Rolling walker   ROM- Right Upper Extremities   R Shoulder AROM; Flexion; Horizontal ABduction   R Elbow AROM;Elbow flexion;Elbow extension   R Hand AROM; Thumb; Index finger; Long finger;Ring finger;Little finger   R Weight/Reps/Sets 10 reps seated on edge of bed with rest breaks   ROM - Left Upper Extremities    L Shoulder AROM; Flexion; Horizontal ABduction   L Elbow AROM;Elbow flexion;Elbow extension   L Hand AROM; Thumb; Index finger; Long finger;Ring finger;Little finger   L Weight/Reps/Sets 10 reps seated on edge of bed with rest breaks   Cognition   Following Commands Follows all commands and directions without difficulty   Activity Tolerance   Activity Tolerance Patient limited by fatigue  (SOB with minimal activity)   Medical Staff Made Aware case management   Assessment   Assessment Patient seen for OT treatment  Patient is generally weak and deconditioned  Patient is SOB with minimal activity requiring multiple rest breaks throughout session  Patient requires min assist for LB ADLS  Patients will benefit from STR due to limited activity tolerance/endurance to complete ADLS/IADLS  The patient's raw score on the AM-PAC Daily Activity inpatient short form is 18, standardized score is 38 66, less than 39 4  Patients at this level are likely to benefit from DC to post-acute rehabilitation services  Please refer to the recommendation of the Occupational Therapist for safe DC planning  Plan   Treatment Interventions ADL retraining;Functional transfer training;UE strengthening/ROM; Endurance training; Activityengagement; Compensatory technique education; Energy conservation;Equipment evaluation/education;Patient/family training   OT Frequency 3-5x/wk   Recommendation   OT Discharge Recommendation Post acute rehabilitation services   AM-[facility] Daily Activity Inpatient   Lower Body Dressing 3   Bathing 2   Toileting 3   Upper Body Dressing 3   Grooming 3   Eating 4   Daily Activity Raw Score 18   Daily Activity Standardized Score (Calc for Raw Score >=11) 38 66   AM-PAC Applied Cognition Inpatient   Following a Speech/Presentation 4   Understanding Ordinary Conversation 4   Taking Medications 4   Remembering Where Things Are Placed or Put Away 4   Remembering List of 4-5 Errands 4   Taking Care of Complicated Tasks 4   Applied Cognition Raw Score 24   Applied Cognition Standardized Score 66 42   Licensure   [place] License Number  Jess Postin [facility] OTR/L [id number]

## 2022-06-09 NOTE — PLAN OF CARE
Problem: Nutrition/Hydration-ADULT  Goal: Nutrient/Hydration intake appropriate for improving, restoring or maintaining nutritional needs  Description: Monitor and assess patient's nutrition/hydration status for malnutrition  Collaborate with interdisciplinary team and initiate plan and interventions as ordered  Monitor patient's weight and dietary intake as ordered or per policy  Utilize nutrition screening tool and intervene as necessary  Determine patient's food preferences and provide high-protein, high-caloric foods as appropriate       INTERVENTIONS:  - Monitor oral intake, urinary output, labs, and treatment plans  - Assess nutrition and hydration status and recommend course of action  - Evaluate amount of meals eaten  - Assist patient with eating if necessary   - Allow adequate time for meals  - Recommend/ encourage appropriate diets, oral nutritional supplements, and vitamin/mineral supplements  - Order, calculate, and assess calorie counts as needed  - Assess need for intravenous fluids  - Provide specific nutrition/hydration education as appropriate  - Include patient/family/caregiver in decisions related to nutrition  Outcome: Progressing     Problem: MOBILITY - ADULT  Goal: Maintain or return to baseline ADL function  Description: INTERVENTIONS:  -  Assess patient's ability to carry out ADLs; assess patient's baseline for ADL function and identify physical deficits which impact ability to perform ADLs (bathing, care of mouth/teeth, toileting, grooming, dressing, etc )  - Assess/evaluate cause of self-care deficits   - Assess range of motion  - Assess patient's mobility; develop plan if impaired  - Assess patient's need for assistive devices and provide as appropriate  - Encourage maximum independence but intervene and supervise when necessary  - Involve family in performance of ADLs  - Assess for home care needs following discharge   - Consider OT consult to assist with ADL evaluation and planning for discharge  - Provide patient education as appropriate  Outcome: Progressing     Problem: Potential for Falls  Goal: Patient will remain free of falls  Description: INTERVENTIONS:  - Educate patient/family on patient safety including physical limitations  - Instruct patient to call for assistance with activity   - Consult OT/PT to assist with strengthening/mobility   - Keep Call bell within reach  - Keep bed low and locked with side rails adjusted as appropriate  - Keep care items and personal belongings within reach  - Initiate and maintain comfort rounds  - Make Fall Risk Sign visible to staff  - Offer Toileting every 2 Hours, in advance of need  - Initiate/Maintain bed alarm  - Apply yellow socks and bracelet for high fall risk patients  - Consider moving patient to room near nurses station  Outcome: Progressing     Problem: RESPIRATORY - ADULT  Goal: Achieves optimal ventilation and oxygenation  Description: INTERVENTIONS:  - Assess for changes in respiratory status  - Assess for changes in mentation and behavior  - Position to facilitate oxygenation and minimize respiratory effort  - Oxygen administered by appropriate delivery if ordered  - Initiate smoking cessation education as indicated  - Encourage broncho-pulmonary hygiene including cough, deep breathe, Incentive Spirometry  - Assess the need for suctioning and aspirate as needed  - Assess and instruct to report SOB or any respiratory difficulty  - Respiratory Therapy support as indicated  Outcome: Progressing     Problem: GENITOURINARY - ADULT  Goal: Absence of urinary retention  Description: INTERVENTIONS:  - Assess patients ability to void and empty bladder  - Monitor I/O  - Bladder scan as needed  - Discuss with physician/AP medications to alleviate retention as needed  - Discuss catheterization for long term situations as appropriate  Outcome: Progressing

## 2022-06-09 NOTE — CASE MANAGEMENT
Case Management Progress Note    Patient name Iris Jones  Location [facility] 321/3 3024 Peggy Musa-* MRN [id number]  : 1945 Date 2022       LOS (days): 3  Geometric Mean LOS (GMLOS) (days): 3 60  Days to GMLOS:0 7        OBJECTIVE:        Current admission status: Inpatient  Preferred Pharmacy:   [facility], [address]  [address] [place] [address]  Phone: [phone] Fax: [phone]    [address] [facility] [address]  [address] [facility] [address]  Phone: [phone] Fax: [phone]    [address], [address]  [facility]  [address] [facility] [address]  Phone: [phone] Fax: [phone]    Primary Care Provider: Salvador Kendrick MD    Primary Insurance: MEDICARE  Secondary Insurance: [facility]    PROGRESS NOTE:  CM was notified at morning rounds that patient is medically stable for discharge  Per rounds, the care team is now recommending post-acute rehab and the patient/daughter are requesting post-acute rehab  OT re-evaluated the patient this morning  CM met with patient at bedside  Patient states that he chooses [address]  Referral placed to [facility] and patient is accepted with bed available today per [facility] liaison and per response in [address]  IMM reviewed with the patient at bedside; patient agrees with the discharge determination  Patient requires transportation  CM sent BLS transportation request to [facility] via [address]  Transport time is 1530 with [place] per response in [address] from [facility] Necessity completed, uploaded to SLE via [email or web address], and placed in label book  [place] [facility] actually came at 1330 instead of 1530  Care Team updated, CM spoke to patient's daughter Dodie Saldivar at [phone] to verify discharge plan, CM updated [place] via [address] of transport time

## 2022-06-09 NOTE — PROGRESS NOTES
Progress Note - Pulmonary   Olivia Urbina 65 y o  male MRN: [id number]  Unit/Bed#: [address] [place] [address] Encounter: [id number]    Assessment:  Acute exacerbation of severe COPD with some improvement  Severe COPD with FEV1 1 59 L or 40%  Parkinson's disease  Alpha 1 antitrypsin deficiency for which he gets weekly supplemental therapy with anti protease  Mild oropharyngeal dysphagia as noted by modified video fluoroscopic swallow study done by speech therapist today  Appreciate input      Plan:  Continue methylprednisolone 20 mg IV every 8 hours  S treatments with DuoNeb t i d  Subjective:   Patient states breathing slightly improved  Still has nonproductive cough    Objective:     Vitals: Blood pressure 142/64, pulse 68, temperature 98 5 °F (36 9 °C), temperature source Oral, resp  rate 21, height 6' 5" (1 956 m), weight 86 2 kg (190 lb), SpO2 96 %  ,Body mass index is 22 53 kg/m²  Intake/Output Summary (Last 24 hours) at 6/8/2022 2001  Last data filed at 6/8/2022 1601  Gross per 24 hour   Intake 470 ml   Output 750 ml   Net -280 ml       Physical Exam: Physical Exam  Vitals reviewed  Constitutional:       General: He is not in acute distress  Appearance: Normal appearance  He is well-developed  Comments: On 3 liter/minute nasal cannula oxygen O2 saturation 97%   HENT:      Head: Normocephalic  Right Ear: External ear normal       Left Ear: External ear normal       Nose: Nose normal       Mouth/Throat:      Pharynx: No oropharyngeal exudate  Eyes:      Conjunctiva/sclera: Conjunctivae normal       Pupils: Pupils are equal, round, and reactive to light  Neck:      Vascular: No JVD  Trachea: No tracheal deviation  Cardiovascular:      Rate and Rhythm: Normal rate and regular rhythm  Heart sounds: Normal heart sounds  Pulmonary:      Effort: Pulmonary effort is normal       Comments: Lung sounds decreased    Few faint expiratory wheezes at bases  Abdominal:      General: There is no distension  Palpations: Abdomen is soft  Tenderness: There is no abdominal tenderness  There is no guarding  Musculoskeletal:      Cervical back: Neck supple  Comments: No edema   Lymphadenopathy:      Cervical: No cervical adenopathy  Skin:     General: Skin is warm and dry  Findings: No rash  Neurological:      Mental Status: He is alert and oriented to person, place, and time  Psychiatric:         Mood and Affect: Mood normal          Behavior: Behavior normal          Thought Content: Thought content normal           Labs: I have personally reviewed pertinent lab results  , ABG:   Lab Results   Component Value Date    PHART 7 430 04/28/2022    UCA7OID 41 4 04/28/2022    PO2ART 171 2 (H) 04/28/2022    NZC1GIC 26 9 04/28/2022    BEART 2 3 04/28/2022    SOURCE Radial, Right 04/28/2022   , BNP: No results found for: BNP, CBC:   Lab Results   Component Value Date    WBC 11 63 (H) 06/08/2022    HGB 11 9 (L) 06/08/2022    HCT 35 6 (L) 06/08/2022    MCV 95 06/08/2022     06/08/2022    MCH 31 6 06/08/2022    MCHC 33 4 06/08/2022    RDW 15 0 06/08/2022    MPV 9 8 06/08/2022    NRBC 0 06/08/2022   , CMP:   Lab Results   Component Value Date     09/27/2016    K 4 7 06/08/2022    K 4 2 09/27/2016     06/08/2022     09/27/2016    CO2 25 06/08/2022    CO2 23 09/27/2016    BUN 33 (H) 06/08/2022    BUN 14 09/27/2016    CREATININE 1 13 06/08/2022    CREATININE 1 09 09/27/2016    GLUCOSE 89 09/27/2016    CALCIUM 8 9 06/08/2022    CALCIUM 9 3 09/27/2016    AST 27 06/05/2022    AST 18 09/27/2016    ALT 16 06/05/2022    ALT 16 09/27/2016    ALKPHOS 77 06/05/2022    ALKPHOS 57 09/27/2016    PROT 6 9 09/27/2016    BILITOT 1 0 09/27/2016    EGFR 61 06/08/2022   , PT/INR:   Lab Results   Component Value Date    INR 1 10 04/28/2022   , Troponin: No results found for: TROPONIN    Imaging and other studies: I have personally reviewed pertinent reports     and I have personally reviewed pertinent films in PACS

## 2022-06-09 NOTE — NURSING NOTE
Patient left via stretcher in stable condition with all belongings accompanied by transport team   Unable to give report to receiving facility (no one available) but left voicemail to call back for report

## 2022-06-09 NOTE — PROGRESS NOTES
[facility] Practice Progress Note - Iris Jones 65 y o  male MRN: [id number]    Unit/Bed#: [address] [place] [address] Encounter: [id number]      Assessment/Plan:  * COPD with acute exacerbation [facility])  Assessment & Plan  Acute on chronic  Patient with history of AAT deficiency and centrilobular emphysema presents with worsening sob over the last couple days with no relief of symptoms with use of nebulizer at home  He is on home oxygen baseline 2-3 L  At the ER he received DuoNebs, Solu-Medrol, albuterol  · Solu Medrol 20 mg q 8h  · Continue home Pulmicort  · Continue albuterol q 4h p r n  · DuoNebs 4 times daily  · Continues pulse ox  · Spirometry  · Keep SpO2 above 92%  · Patient receives weekly Zemaira injections for AAT, will order when indicated patient remains in hospital     Parkinson's disease [facility])  Assessment & Plan  Chronic  · Continue carbidopa-levodopa  · Video barium swallow  · Speech consult  · Neurology consult    Depression, recurrent ([address] )  Assessment & Plan  Chronic, stable  · Continue home medications mirtazapine and BuSpar    Peripheral neuropathy  Assessment & Plan  Chronic, stable  · Continue gabapentin    Ambulatory dysfunction  Assessment & Plan  Chronic  · Fall precautions  · PT/OT    Insomnia  Assessment & Plan  Chronic, stable  · Continue home trazodone and mirtazapine    BPH (benign prostatic hyperplasia)  Assessment & Plan  Chronic, stable  · Continue Flomax and finasteride    Acute and chronic respiratory failure with hypoxia (HCC)  Assessment & Plan  Acute on chronic  Secondary to AAT and centrilobular emphysema with exacerbation of symptoms as evidence by respiratory distress, increased requirement for bronchodilators, and tachypnea  · See plan above      Essential hypertension  Assessment & Plan  Chronic, stable  · Monitor blood pressures  · Continue home medications Lopressor and Norvasc  · Continue midodrine with hold parameters    Gastroesophageal reflux disease  Assessment & Plan  Chronic, stable  · Continue home Protonix 40 mg b i d  Alpha-1-antitrypsin deficiency [facility])  Assessment & Plan  He is on Zemaira once weekly  Continue home medication when indicated  Subjective:   Patient seen at bedside  Complains of coughing and feeling bloated and some abdominal pain  Reports his breathing is somewhat improving but continues with dyspnea on exertion  Denies chest pain  Reports last BM yesterday  Objective:     Vitals: Blood pressure 134/69, pulse 59, temperature 97 8 °F (36 6 °C), temperature source Oral, resp  rate 19, height 6' 5" (1 956 m), weight 86 2 kg (190 lb), SpO2 97 %  ,Body mass index is 22 53 kg/m²  Wt Readings from Last 3 Encounters:   06/05/22 86 2 kg (190 lb)   05/25/22 81 9 kg (180 lb 9 6 oz)   05/23/22 82 1 kg (181 lb)       Intake/Output Summary (Last 24 hours) at 6/9/2022 0757  Last data filed at 6/8/2022 1601  Gross per 24 hour   Intake 470 ml   Output 750 ml   Net -280 ml       Physical Exam  Vitals reviewed  Constitutional:       General: He is awake  He is not in acute distress  Cardiovascular:      Rate and Rhythm: Normal rate and regular rhythm  Heart sounds: Normal heart sounds, S1 normal and S2 normal    Pulmonary:      Effort: Pulmonary effort is normal       Breath sounds: Normal breath sounds and air entry  No decreased breath sounds, wheezing, rhonchi or rales  Abdominal:      General: Abdomen is protuberant  Bowel sounds are normal       Palpations: Abdomen is soft  Tenderness: There is abdominal tenderness in the epigastric area  There is no guarding  Neurological:      Mental Status: He is alert  Psychiatric:         Behavior: Behavior is cooperative           Recent Results (from the past 24 hour(s))   CBC and differential    Collection Time: 06/09/22  5:07 AM   Result Value Ref Range    WBC 12 69 (H) 4 31 - 10 16 Thousand/uL    RBC 3 82 (L) 3 88 - 5 62 Million/uL    Hemoglobin 12 2 12 0 - 17 0 g/dL    Hematocrit 36 1 (L) 36 5 - 49 3 %    MCV 95 82 - 98 fL    MCH 31 9 26 8 - 34 3 pg    MCHC 33 8 31 4 - 37 4 g/dL    RDW 14 8 11 6 - 15 1 %    MPV 9 4 8 9 - 12 7 fL    Platelets 266 266 - 188 Thousands/uL   Basic metabolic panel    Collection Time: 06/09/22  5:07 AM   Result Value Ref Range    Sodium 139 136 - 145 mmol/L    Potassium 4 7 3 5 - 5 3 mmol/L    Chloride 103 100 - 108 mmol/L    CO2 26 21 - 32 mmol/L    ANION GAP 10 4 - 13 mmol/L    BUN 38 (H) 5 - 25 mg/dL    Creatinine 1 11 0 60 - 1 30 mg/dL    Glucose 118 65 - 140 mg/dL    Calcium 8 8 8 3 - 10 1 mg/dL    eGFR 63 ml/min/1 73sq m   Manual Differential(PHLEBS Do Not Order)    Collection Time: 06/09/22  5:07 AM   Result Value Ref Range    Segmented % 61 43 - 75 %    Bands % 2 0 - 8 %    Lymphocytes % 30 14 - 44 %    Monocytes % 4 4 - 12 %    Eosinophils, % 0 0 - 6 %    Basophils % 0 0 - 1 %    Myelocytes % 1 0 - 1 %    Atypical Lymphocytes % 2 (H) <=0 %    Absolute Neutrophils 7 99 (H) 1 85 - 7 62 Thousand/uL    Lymphocytes Absolute 3 81 0 60 - 4 47 Thousand/uL    Monocytes Absolute 0 51 0 00 - 1 22 Thousand/uL    Eosinophils Absolute 0 00 0 00 - 0 40 Thousand/uL    Basophils Absolute 0 00 0 00 - 0 10 Thousand/uL    Total Counted      nRBC 1 0 - 2 /100 WBC    Hypersegmented Neutrophils Present     Smudge Cells Present     RBC Morphology Present     Basophilic Stippling Present     Polychromasia Present     Platelet Estimate Adequate Adequate       Current Facility-Administered Medications   Medication Dose Route Frequency Provider Last Rate Last Admin    acetaminophen (TYLENOL) tablet 650 mg  650 mg Oral Q6H PRN Dory Conteh MD        amLODIPine (NORVASC) tablet 10 mg  10 mg Oral Daily Dory Conteh MD   10 mg at 06/08/22 5887    budesonide (PULMICORT) inhalation solution 0 5 mg  0 5 mg Nebulization BID Dory Conteh MD   0 5 mg at 06/09/22 0800    busPIRone (BUSPAR) tablet 10 mg  10 mg Oral TID [facility] Janet Hobson MD   10 mg at 06/08/22 2145    carbidopa-levodopa (SINEMET)  mg per tablet 1 tablet  1 tablet Oral TID Valentín Pena MD   1 tablet at 06/08/22 2145    cholestyramine sugar free (QUESTRAN LIGHT) packet 4 g  4 g Oral Daily Valentín Pena MD   4 g at 06/08/22 2822    enoxaparin (LOVENOX) subcutaneous injection 40 mg  40 mg Subcutaneous Daily Valentín Pena MD   40 mg at 06/08/22 1545    finasteride (PROSCAR) tablet 5 mg  5 mg Oral QAM Valentín Pena MD   5 mg at 06/08/22 5688    fluticasone (FLONASE) 50 mcg/act nasal spray 2 spray  2 spray Nasal Daily Valentín Pena MD   2 spray at 06/08/22 0103    gabapentin (NEURONTIN) capsule 300 mg  300 mg Oral TID Valentín Pena MD   300 mg at 06/08/22 2145    guaiFENesin (ROBITUSSIN) oral solution 200 mg  200 mg Oral Q4H [address], MD   200 mg at 06/09/22 0507    ipratropium-albuterol (DUO-NEB) 0 5-2 5 mg/3 mL inhalation solution 3 mL  3 mL Nebulization 4x Daily Valentín Pena MD   3 mL at 06/09/22 0714    ipratropium-albuterol (DUO-NEB) 0 5-2 5 mg/3 mL inhalation solution 3 mL  3 mL Nebulization Q2H  [address], MD   3 mL at 06/07/22 1407    melatonin tablet 3 mg  3 mg Oral HS Louise Guzman DO   3 mg at 06/08/22 2145    methylPREDNISolone sodium succinate (Solu-MEDROL) injection 20 mg  20 mg Intravenous Q8H [address] Michael Diaz MD   20 mg at 06/09/22 0507    metoprolol tartrate (LOPRESSOR) tablet 25 mg  25 mg Oral Q12H Enzo Moyer MD   25 mg at 06/08/22 2145    midodrine (PROAMATINE) tablet 10 mg  10 mg Oral TID AC Valentín Pena MD   10 mg at 06/08/22 0630    mirtazapine (REMERON) tablet 30 mg  30 mg Oral  Valentín Pena MD   30 mg at 06/08/22 2145    ondansetron (ZOFRAN-ODT) dispersible tablet 4 mg  4 mg Oral Q6H  [address] MD Gerry   4 mg at 06/07/22 1402    pantoprazole (PROTONIX) EC tablet 40 mg  40 mg Oral BID Lulu Flores MD   40 mg at 06/08/22 1700    tamsulosin (FLOMAX) capsule 0 4 mg  0 4 mg Oral Daily With muzu tv, DO   0 4 mg at 06/08/22 1700       Invasive Devices  Report    Peripheral Intravenous Line  Duration           Peripheral IV 06/05/22 Right Antecubital 3 days                Lab, Imaging and other studies: I have personally reviewed pertinent reports      VTE Pharmacologic Prophylaxis: Enoxaparin (Lovenox)  VTE Mechanical Prophylaxis: sequential compression device    Lulu Flores MD

## 2022-06-09 NOTE — PLAN OF CARE
Problem: OCCUPATIONAL THERAPY ADULT  Goal: Performs self-care activities at highest level of function for planned discharge setting  See evaluation for individualized goals  Description: Treatment Interventions: ADL retraining, Functional transfer training, UE strengthening/ROM, Endurance training, Activityengagement, Compensatory technique education, Energy conservation, Equipment evaluation/education, Patient/family training          See flowsheet documentation for full assessment, interventions and recommendations  Outcome: Progressing  Note: Limitation: Decreased ADL status, Decreased UE ROM, Decreased UE strength, Decreased endurance, Decreased fine motor control, Decreased self-care trans, Decreased high-level ADLs  Prognosis: Good  Assessment: Patient seen for OT treatment  Patient is generally weak and deconditioned  Patient is SOB with minimal activity requiring multiple rest breaks throughout session  Patient requires min assist for LB ADLS  Patients will benefit from STR due to limited activity tolerance/endurance to complete ADLS/IADLS       OT Discharge Recommendation: Post acute rehabilitation services

## 2022-06-09 NOTE — DISCHARGE INSTR - AVS FIRST PAGE
Complete the steroid taper:  Predinosone 40mg x1 day, 30mg x1 day, 20mg x1 day, then 10mg x1 day    Please continue to dysphagia soft diet (level 3) with thin liquid  Multiple and effortful swallows to minimize food residue in the pharynx and risk for overflow  Follow up with speech thearpy for dysphagia  Follow up with physical therapy for gait training  Outpatient Parkinson's Disease Boxing program if tolerated      Follow up with your family physician, Neurologist, and Pulmonologist

## 2022-06-09 NOTE — DISCHARGE SUMMARY
[facility] Discharge Summary - Medical Olivia Urbina 65 y o  male MRN: [id number]    [address]  Room / Bed: [address] [facility] Encounter: [id number]    BRIEF OVERVIEW  Admitting Provider: Omar Wolff MD  Discharge Provider:  LEANDER Diggs  Discharge To:  Post acute rehab  Facility: [address] [place] [address]    Outpatient Follow-Up: Family physician, Neurologist, and Pulmonologist     Things to address at first follow up visit:   · Complete the steroid taper  · Please continue to dysphagia soft diet (level 3) with thin liquid  Multiple and effortful swallows to minimize food residue in the pharynx and risk for overflow  · Follow up with speech therapy for dysphagia  · Follow up with physical therapy for gait training  Outpatient Parkinson's Disease Boxing program if tolerated  · Consider referral to OP SLP Dysphagia therapy (train exercises and strategies to strengthen airway rise/protection and "driving" forces on boluses as well as to ensure strategy usage)  Labs and results pending at discharge:  None    Admission Date: 6/5/2022     Discharge Date:  06/09/2022    Primary Discharge Diagnosis  Principal Problem:    COPD with acute exacerbation ([facility] [address] )  Active Problems:    Alpha-1-antitrypsin deficiency ([facility] [address] )    Gastroesophageal reflux disease    Essential hypertension    Acute and chronic respiratory failure with hypoxia ([facility])    BPH (benign prostatic hyperplasia)    Insomnia    Ambulatory dysfunction    Peripheral neuropathy    Depression, recurrent ([facility] [address] )    Parkinson's disease ([facility] [address] )  Resolved Problems:    * No resolved hospital problems   *      Consulting Providers:  Pulmonology, Neurology, PT OT, speech pathology      [address] STAY    Procedures Performed/Pertinent Test results  6/8: wbc 11 63, Cr 1 13, BUN 33  6/7: wbc 10 07, procal 0 07  6/6: wbc 7 31,  FOBT negative HGB 11 8  6/5: Trop 10, CBC CMP wnl, , procal 0 08 HGB 13 2  Barium video swallow:    HPI: Copied from H&P  69-year-old male with H/O alpha-1 antitrypsin deficiency, central lobular emphysema on 3 L home O2, Parkinson's disease, HTN presents to the ED with 3 days H/O worsening SOB with increased cough  Patient reports having to increase his nebulizer treatments to BID within the past week  He is not able to bring up any mucus  He denies any allergies or sick contact  Patient also reported epigastric tenderness that woke him up around 4:00 a m  This morning  Patient has 2 pillow orthopnea, but denies PND or leg swelling  He reported ongoing right lower quadrant pain  Last bowel movement this morning, and reports passing flatus      ED course:  DuoNeb 3 mL, Solu-Medrol 125 mg, albuterol nebulizer, Mag sulfate 2 g    Hospital Course  COPD with acute exacerbation  Present to the ER after worsening shortness of breath did not improve with home nebulizer treatments  He was continued on his home oxygen and started on Solu-Medrol and his home inhalers and continued on bronchodilators  He did not receive any antibiotic treatment during his hospitalization  Pulmonary was consulted who recommended continuing Solu-Medrol  Patient was discharged to short-term rehab on prednisone taper  Parkinson's disease  Patient seen by Neurology due to concerns dysphagia and balance problems which could be related to Parkinson's disease  He was seen by speech pathology who recommended barium video swallow, who later recommended soft level 3 diet and thin liquids  He was seen by Neurology who reported that patient's mobility improved after starting sinemet and should continue it  As for the dysphagia, neurology concurred with speech pathologies recommendations  Ambulatory dysfunction  Patient seen by PT/OT who recommended short-term rehab  Physical Exam at Discharge  Physical Exam  Constitutional:       General: He is not in acute distress  Appearance: Normal appearance     HENT: Head: Normocephalic and atraumatic  Eyes:      Pupils: Pupils are equal, round, and reactive to light  Cardiovascular:      Rate and Rhythm: Normal rate and regular rhythm  Pulses: Normal pulses  Heart sounds: Normal heart sounds  No murmur heard  Pulmonary:      Effort: Pulmonary effort is normal  No respiratory distress  Breath sounds: Normal breath sounds  No wheezing  Abdominal:      General: Bowel sounds are normal       Palpations: Abdomen is soft  Tenderness: There is abdominal tenderness (Epigastric)  Skin:     General: Skin is warm and dry  Neurological:      General: No focal deficit present  Mental Status: He is alert and oriented to person, place, and time  Psychiatric:         Mood and Affect: Mood normal          Behavior: Behavior normal           Medications   Discharge Medication List as of 6/9/2022  1:27 PM          Discharge Medication List as of 6/9/2022  1:27 PM      CONTINUE these medications which have NOT CHANGED    Details   amLODIPine (NORVASC) 10 mg tablet TAKE ONE TABLET BY MOUTH DAILY , Normal      budesonide (PULMICORT) 0 5 mg/2 mL nebulizer solution Take 2 mL (0 5 mg total) by nebulization in the morning and 2 mL (0 5 mg total) in the evening , Starting Wed 5/11/2022, Until Tue 8/9/2022, Normal      busPIRone (BUSPAR) 10 mg tablet TAKE ONE TABLET BY MOUTH THREE TIMES DAILY, Normal      carbidopa-levodopa (SINEMET)  mg per tablet Take 1 tablet by mouth in the morning and 1 tablet in the evening and 1 tablet before bedtime  , Starting Wed 5/11/2022, Normal      cholestyramine (QUESTRAN) 4 g packet Take 1 packet (4 g total) by mouth in the morning , Starting Wed 5/11/2022, Normal      Diclofenac Sodium (VOLTAREN) 1 % Apply 2 g topically 4 (four) times a day for 7 days Apply to R knee, Starting Fri 12/3/2021, Until Fri 4/1/2022, Normal      finasteride (PROSCAR) 5 mg tablet TAKE ONE TABLET BY MOUTH IN THE MORNING , Normal      fluticasone (FLONASE) 50 mcg/act nasal spray 2 sprays into each nostril daily, Starting Mon 7/6/2020, Normal      gabapentin (NEURONTIN) 300 mg capsule TAKE 1 CAPSULE BY MOUTH 3 TIMES DAILY, Normal      ipratropium-albuterol (DUO-NEB) 0 5-2 5 mg/3 mL nebulizer solution Take 3 mL by nebulization 4 (four) times a day, Starting Wed 8/11/2021, Normal      metoprolol tartrate (LOPRESSOR) 25 mg tablet Take 1 tablet (25 mg total) by mouth every 12 (twelve) hours, Starting Wed 5/11/2022, Normal      midodrine (PROAMATINE) 10 MG tablet Take 1 tab three times daily   Please hold medication if blood pressure is above 278 systolic , Normal      mirtazapine (REMERON) 15 mg tablet Take 2 tablets (30 mg total) by mouth daily at bedtime, Starting Mon 5/23/2022, Normal      OXYGEN-HELIUM IN Inhale 2L at rest- 3L with activity, Historical Med      pantoprazole (PROTONIX) 40 mg tablet TAKE ONE TABLET BY MOUTH TWICE DAILY, Normal      tamsulosin (FLOMAX) 0 4 mg TAKE ONE CAPSULE BY MOUTH ONE TIME DAILY, Normal      Ventolin  (90 Base) MCG/ACT inhaler Inhale 2 puffs every 4 (four) hours as needed for wheezing, Starting Mon 11/8/2021, Normal      ZEMAIRA infusion once a week , Starting Thu 12/26/2019, Historical Med            Discharge Medication List as of 6/9/2022  1:27 PM      START taking these medications    Details   predniSONE 10 mg tablet Multiple Dosages:Starting Thu 6/9/2022, Until Thu 6/9/2022 at 2359, THEN Starting Fri 6/10/2022, Until Fri 6/10/2022 at 2359, THEN Starting Sat 6/11/2022, Until Sat 6/11/2022 at 2359, THEN Starting Sun 6/12/2022, Until Sun 6/12/2022 at 2359Take 4 ta blets (40 mg total) by mouth daily for 1 day, THEN 3 tablets (30 mg total) daily for 1 day, THEN 2 tablets (20 mg total) daily for 1 day, THEN 1 tablet (10 mg total) daily for 1 day , Normal            Discharge Medication List as of 6/9/2022  1:27 PM         Allergies  Allergies   Allergen Reactions    Chantix [Varenicline]      Caused prostate infection Diet restrictions: Dysphagia soft level 3 diet with thin liquid  Activity restrictions: Outpatient PT referral for gait training  Code Status: Prior  Advance Directive and Living Will: <no information>  Power of :    POLST:      Discharge Condition: stable      Discharge  Statement   I spent >30 minutes discharging the patient  This time was spent on the day of discharge  I had direct contact with the patient on the day of discharge  Greater than 50% of the total time was spent examining patient, answering all patient questions, arranging and discussing plan of care with patient as well as directly providing post-discharge instructions  Additional time then spent on discharge activities  Discussed with patient at length regarding discharge and follow-up plan

## 2022-06-09 NOTE — TRANSPORTATION MEDICAL NECESSITY
Section I - General Information    Name of Patient: Ivania Huddleston                 : 1945    Medicare #: [id number]  Transport Date: 22 (PCS is valid for round trips on this date and for all repetitive trips in the 60-day range as noted below )  Origin: [address]  Is the pt's stay covered under Medicare Part A (PPS/DRG)   [x]     Closest appropriate facility? If no, why is transport to more distant facility required? Yes  If hospice pt, is this transport related to pt's terminal illness? NA       Section II - Medical Necessity Questionnaire  Ambulance transportation is medically necessary only if other means of transport are contraindicated or would be potentially harmful to the patient  To meet this requirement, the patient must either be "bed confined" or suffer from a condition such that transport by means other than ambulance is contraindicated by the patient's condition  The following questions must be answered by the medical professional signing below for this form to be valid:    1)  Describe the MEDICAL CONDITION (physical and/or mental) of this patient AT [address] [place] [address] that requires the patient to be transported in an ambulance and why transport by other means is contraindicated by the patient's condition: Patient is a [place] High Fall Risk  Patient is on 3LO2  Patient is weak and deconditioned due to chronic conditions including COPD, Parkinson's disease    2) Is the patient "bed confined" as defined below? No  To be "be confined" the patient must satisfy all three of the following conditions: (1) unable to get up from bed without Assistance; AND (2) unable to ambulate; AND (3) unable to sit in a chair or wheelchair      3) Can this patient safely be transported by car or wheelchair van (i e , seated during transport without a medical attendant or monitoring)? No    4) In addition to completing questions 1-3 above, please check any of the following conditions that apply*:   *Note: supporting documentation for any boxes checked must be maintained in the patient's medical records  If hosp-hosp transfer, describe services needed at 2nd facility not available at 1st facility? Requires oxygen-unable to self administer  Other(specify) Patient is a Betts High Fall Risk  Patient is on 3LO2  Patient is weak and deconditioned due to chronic conditions including COPD, Parkinson's disease      Section III - Signature of Physician or Healthcare Professional  I certify that the above information is true and correct based on my evaluation of this patient, and represent that the patient requires transport by ambulance and that other forms of transport are contraindicated  I understand that this information will be used by the Centers for Medicare and Medicaid Services (CMS) to support the determination of medical necessity for ambulance services, and I represent that I have personal knowledge of the patient's condition at time of transport  []  If this box is checked, I also certify that the patient is physically or mentally incapable of signing the ambulance service's claim and that the institution with which I am affiliated has furnished care, services, or assistance to the patient  My signature below is made on behalf of the patient pursuant to 42 CFR §424 36(b)(4)   In accordance with 42 CFR §424 37, the specific reason(s) that the patient is physically or mentally incapable of signing the claim form is as follows: NA      Signature of Physician* or Healthcare Professional______________________________________________________________  Signature Date 06/09/22 (For scheduled repetitive transports, this form is not valid for transports performed more than 60 days after this date)    Printed Name & Credentials of Physician or Healthcare Professional (MD, , RN, etc ) [employer]  *Form must be signed by patient's attending physician for scheduled, repetitive transports   For non-repetitive, unscheduled ambulance transports, if unable to obtain the signature of the attending physician, any of the following may sign (choose appropriate option below)  [] Physician Assistant []  Clinical Nurse Specialist []  Registered Nurse  []  Nurse Practitioner  [x] Discharge Planner

## 2022-06-10 ENCOUNTER — NURSING HOME VISIT (OUTPATIENT)
Dept: GERIATRICS | Facility: OTHER | Age: 77
End: 2022-06-10
Payer: MEDICARE

## 2022-06-10 ENCOUNTER — HOME CARE VISIT (OUTPATIENT)
Dept: HOME HEALTH SERVICES | Facility: HOME HEALTHCARE | Age: 77
End: 2022-06-10
Payer: MEDICARE

## 2022-06-10 VITALS
OXYGEN SATURATION: 96 % | RESPIRATION RATE: 16 BRPM | SYSTOLIC BLOOD PRESSURE: 133 MMHG | BODY MASS INDEX: 21.58 KG/M2 | DIASTOLIC BLOOD PRESSURE: 87 MMHG | WEIGHT: 182 LBS | HEART RATE: 61 BPM | TEMPERATURE: 97.8 F

## 2022-06-10 DIAGNOSIS — J96.11 CHRONIC RESPIRATORY FAILURE WITH HYPOXIA (HCC): Chronic | ICD-10-CM

## 2022-06-10 DIAGNOSIS — R39.16 BENIGN PROSTATIC HYPERPLASIA (BPH) WITH STRAINING ON URINATION: ICD-10-CM

## 2022-06-10 DIAGNOSIS — I95.1 ORTHOSTATIC HYPOTENSION: ICD-10-CM

## 2022-06-10 DIAGNOSIS — G20 PARKINSON'S DISEASE (HCC): ICD-10-CM

## 2022-06-10 DIAGNOSIS — N40.1 BENIGN PROSTATIC HYPERPLASIA (BPH) WITH STRAINING ON URINATION: ICD-10-CM

## 2022-06-10 DIAGNOSIS — J43.2 CENTRILOBULAR EMPHYSEMA (HCC): Primary | ICD-10-CM

## 2022-06-10 DIAGNOSIS — I10 ESSENTIAL HYPERTENSION: ICD-10-CM

## 2022-06-10 DIAGNOSIS — F33.9 DEPRESSION, RECURRENT (HCC): ICD-10-CM

## 2022-06-10 PROCEDURE — 99306 1ST NF CARE HIGH MDM 50: CPT | Performed by: FAMILY MEDICINE

## 2022-06-10 NOTE — PROGRESS NOTES
[address]  [address] [facility] 27 [facility], [address] [facility] 31  History and Physical    NAME: Burton Colorado  AGE: 65 y o  SEX: male [id number]    DATE OF ENCOUNTER: 6/10/2022    Code status:  CPR    Assessment and Plan     1  Essential hypertension  - /87   - Continue Amlodipin 10 mg qd  Will decrease Midodrine from 10 mg tid to 5 mg tid  - Monitor BP daily    2  Parkinson's disease ([address] )  - Continue Sinemet  mg tid as per neurology  - Soft level 3 diet and thin liquid for dysphagia   - OT/PT/speech therapist    3  Orthostatic hypotension with spine hypertension  - On Midodrine 10 mg tid  However, /87  Will decrease to 5 mg tid    4  Centrilobular emphysema (HCC)  - Uses Pulmicort qd and Ventolin prn at home  - S/p hospitalization for acute exacerbation, treated with Solu-Medrol and duo-neb  - Continue prednisone taper and duo-neb q6H     5  Depression  - Stable on Mirtazipine 30 mg hs and Buspar 10 mg tid    6  BPH:  - Continue Finasteride 5 mg qd  - Needs to FU as outpatient with Urology given Finasteride has not helping with urinary hesitancy  7  Chronic respiratory failure with hypoxia  - Continue home oxygen 3L  SpO2 96%      All medications and routine orders were reviewed and updated as needed  Plan discussed with: staff    Chief Complaint     Seen for admission at [address] [place] [address]    History of Present Illness     64 yo male with PMHx COPD, HTN, BPH, and depression who was admitted to [address] [place] [address] for short-term rehab on 6/9/22  The patient was hospitalized in [place] [address] from 6/5 to 6/9 for acute exacerbation of COPD  Today, patient is doing well on oxygen 3L  He states his appetite is good  However, c/o meal portion is small in the facility  He has BMs daily       HISTORY:  Past Medical History:   Diagnosis Date    Anesthesia complication     Difficult to wake up    Arthritis     BPH (benign prostatic hyperplasia) urinary frequency    Cancer (HCC)     basal cell neck, face    COPD (chronic obstructive pulmonary disease) (HCC)     COPD exacerbation (HCC) 2019    Elevated PSA 10/25/2018    Full dentures     Hiatal hernia     History of methicillin resistant staphylococcus aureus (MRSA)     10/11/2018 MRSA (nares) positive    Hypertension     Irritable bowel syndrome     Kidney stone     at least 7 episodes    Liver disease     Alpha 1- enzyme deficiency - diagnosed   has been on weekly replacement therapy since then    Pulmonary emphysema ([address] )     1/25/15  FEV1 - 2 45 liters or 59% of predicted    RSV infection 2017    Wears glasses     for driving only     Family History   Problem Relation Age of Onset    Emphysema Mother         never smoked    Emphysema Father     Cancer Brother         colon    Colon cancer Brother     Ulcerative colitis Family     Liver disease Family      Social History     Socioeconomic History    Marital status:      Spouse name: None    Number of children: None    Years of education: None    Highest education level: None   Occupational History    None   Tobacco Use    Smoking status: Former Smoker     Packs/day: 1 00     Years: 60 00     Pack years: 60 00     Quit date: 2017     Years since quittin 7    Smokeless tobacco: Never Used    Tobacco comment: quit in 2017   Vaping Use    Vaping Use: Never used   Substance and Sexual Activity    Alcohol use: Never     Comment: stopped in     Drug use: Not Currently    Sexual activity: None   Other Topics Concern    None   Social History Narrative    Patient is   He has three adult children; 1 daughter and 2 sons  His daughter Dodie Saldivar lives closest [place] and is very involved w/ his care  Patient is not Yazdanism  He lives alone       Social Determinants of Health     Financial Resource Strain: Not on file   Food Insecurity: No Food Insecurity    Worried About Running Out of Food in the Last Year: Never true    Ran Out of Food in the Last Year: Never true   Transportation Needs: No Transportation Needs    Lack of Transportation (Medical): No    Lack of Transportation (Non-Medical): No   Physical Activity: Not on file   Stress: Not on file   Social Connections: Not on file   Intimate Partner Violence: Not on file   Housing Stability: Low Risk     Unable to Pay for Housing in the Last Year: No    Number of Places Lived in the Last Year: 1    Unstable Housing in the Last Year: No       Allergies: Allergies   Allergen Reactions    Chantix [Varenicline]      Caused prostate infection       Review of Systems     Review of Systems   Constitutional: Negative for appetite change  HENT: Negative for congestion and rhinorrhea  Respiratory: Negative for cough and wheezing  Cardiovascular: Negative for chest pain and palpitations  Gastrointestinal: Negative for abdominal distention and abdominal pain  Genitourinary: Positive for difficulty urinating (chronic, due to BPH)  Negative for decreased urine volume, dysuria and hematuria  Neurological: Negative for headaches  Psychiatric/Behavioral: Negative for agitation  Medications and orders     All medications reviewed and updated in Nursing Home EMR  Objective     Vitals:   Vitals:    06/10/22 1054   BP: 133/87   Pulse: 61   Resp: 16   Temp: 97 8 °F (36 6 °C)   SpO2: 96%   Weight: 82 6 kg (182 lb)         Physical Exam  Vitals and nursing note reviewed  Constitutional:       General: He is not in acute distress  HENT:      Head: Normocephalic and atraumatic  Nose: Nose normal       Mouth/Throat:      Mouth: Mucous membranes are moist    Eyes:      Conjunctiva/sclera: Conjunctivae normal    Cardiovascular:      Rate and Rhythm: Normal rate and regular rhythm  Pulses: Normal pulses  Heart sounds: No murmur heard  Pulmonary:      Effort: Pulmonary effort is normal       Breath sounds:  No wheezing or rales  Comments: On oxygen NC 3L  Abdominal:      General: Bowel sounds are normal  There is no distension  Palpations: Abdomen is soft  Tenderness: There is no abdominal tenderness  Musculoskeletal:      Cervical back: Normal range of motion  Right lower leg: No edema  Left lower leg: No edema  Skin:     Findings: Bruising (scattered ecchymoses on b/l forearm 2/2 IV insertion) present  Neurological:      Mental Status: He is alert and oriented to person, place, and time  Mental status is at baseline  Psychiatric:         Mood and Affect: Mood normal          Behavior: Behavior normal          Pertinent Laboratory/Diagnostic Studies: The following labs/studies were reviewed please see chart or hospital paperwork for details    Component      Latest Ref Rng & Units 6/9/2022             WBC      4 31 - 10 16 Thousand/uL 12 69 (H)   Red Blood Cell Count      3 88 - 5 62 Million/uL 3 82 (L)   Hemoglobin      12 0 - 17 0 g/dL 12 2   HCT      36 5 - 49 3 % 36 1 (L)   MCV      82 - 98 fL 95   MCH      26 8 - 34 3 pg 31 9   MCHC      31 4 - 37 4 g/dL 33 8   RDW      11 6 - 15 1 % 14 8   MPV      8 9 - 12 7 fL 9 4   Platelet Count      838 - 390 Thousands/uL 167     Component      Latest Ref Rng & Units 6/9/2022   Sodium      136 - 145 mmol/L 139   Potassium      3 5 - 5 3 mmol/L 4 7   Chloride      100 - 108 mmol/L 103   CO2      21 - 32 mmol/L 26   Anion Gap      4 - 13 mmol/L 10   BUN      5 - 25 mg/dL 38 (H)   Creatinine      0 60 - 1 30 mg/dL 1 11   Glucose, Random      65 - 140 mg/dL 118   Calcium      8 3 - 10 1 mg/dL 8 8   eGFR      ml/min/1 73sq m 63       - Counseling Documentation: patient was counseled regarding: prognosis

## 2022-06-16 ENCOUNTER — HOME HEALTH ADMISSION (OUTPATIENT)
Dept: HOME HEALTH SERVICES | Facility: HOME HEALTHCARE | Age: 77
End: 2022-06-16

## 2022-06-20 ENCOUNTER — NURSING HOME VISIT (OUTPATIENT)
Dept: GERIATRICS | Facility: OTHER | Age: 77
End: 2022-06-20
Payer: MEDICARE

## 2022-06-20 VITALS
OXYGEN SATURATION: 96 % | RESPIRATION RATE: 18 BRPM | WEIGHT: 183.5 LBS | HEART RATE: 66 BPM | TEMPERATURE: 97.5 F | SYSTOLIC BLOOD PRESSURE: 132 MMHG | DIASTOLIC BLOOD PRESSURE: 78 MMHG | BODY MASS INDEX: 21.76 KG/M2

## 2022-06-20 DIAGNOSIS — I10 ESSENTIAL HYPERTENSION: ICD-10-CM

## 2022-06-20 DIAGNOSIS — R39.16 BENIGN PROSTATIC HYPERPLASIA (BPH) WITH STRAINING ON URINATION: ICD-10-CM

## 2022-06-20 DIAGNOSIS — R26.2 AMBULATORY DYSFUNCTION: ICD-10-CM

## 2022-06-20 DIAGNOSIS — G20 PARKINSON'S DISEASE (HCC): ICD-10-CM

## 2022-06-20 DIAGNOSIS — J96.11 CHRONIC RESPIRATORY FAILURE WITH HYPOXIA (HCC): Primary | Chronic | ICD-10-CM

## 2022-06-20 DIAGNOSIS — N40.1 BENIGN PROSTATIC HYPERPLASIA (BPH) WITH STRAINING ON URINATION: ICD-10-CM

## 2022-06-20 PROCEDURE — 99309 SBSQ NF CARE MODERATE MDM 30: CPT | Performed by: NURSE PRACTITIONER

## 2022-06-20 NOTE — ASSESSMENT & PLAN NOTE
· Due to Centrilobular empysema and on chronic 3 L NC  · Recent COPD exacerbation- treated with IV Solu-Medrol with transition to PO prednisone   · Completed Prednisone taper at SNF  · Good sats at SNF- Lungs clear  · Continue Pulmicort BID  · Continue Flonase daily  · Continue Duo-Neb Every 6 hrs   · Albuterol HFA PRN     Will check CBC and BMP Wednesday 6/22/22 to monitor

## 2022-06-20 NOTE — PROGRESS NOTES
[facility]  [address]  [phone]  [place]  Code 31 (STR)      NAME: Iris Jones  AGE: 65 y o  SEX: male CODE STATUS: CPR    DATE OF ENCOUNTER: 6/20/2022    Assessment and Plan     1  Chronic respiratory failure with hypoxia (HCC)  Assessment & Plan:  · Due to Centrilobular empysema and on chronic 3 L NC  · Recent COPD exacerbation- treated with IV Solu-Medrol with transition to PO prednisone   · Completed Prednisone taper at SNF  · Good sats at [facility]- Lungs clear  · Continue Pulmicort BID  · Continue Flonase daily  · Continue Duo-Neb Every 6 hrs   · Albuterol HFA PRN     Will check CBC and BMP Wednesday 6/22/22 to monitor       2  Parkinson's disease ([facility] [address] )  Assessment & Plan:  · Chronic and progressive  · Continue Carbidopa-Levodopa  mg TID  · Having difficulty swallowing while inpatient   · Speech recommends Dysphagia level 3 diet with Thin liquids  · Aspiration Precautions  · Fall Precautions   · OP f/u with Neurology as scheduled       3  Ambulatory dysfunction  Assessment & Plan:  · Multifactorial due to advanced age, parkinson's disease, generalized weakness, falls, orthostatic hypotension   · Fall Precautions  · Stabilize BP  · Ensure proper nutrition/hydration   ·  following for d/c planning       4  Benign prostatic hyperplasia (BPH) with straining on urination  Assessment & Plan:  · Chronic - Stable   · Continue Flomax and Proscar         5  Essential hypertension  Assessment & Plan:  · /78 HR 66- Stable  · Continue Norvasc 10 mg daily  · Metoprolol Tartrate 25 mg BID  · Continue Midodrine 10 mg TID with hold parameters          All medications and routine orders were reviewed and updated as needed      Chief Complaint     STR follow up visit    Past Medical and Surgica History      Past Medical History:   Diagnosis Date    Anesthesia complication     Difficult to wake up    Arthritis     BPH (benign prostatic hyperplasia)     urinary frequency    Cancer ([facility] [address] )     basal cell neck, face    COPD (chronic obstructive pulmonary disease) (HCC)     COPD exacerbation (HCC) 12/29/2019    Elevated PSA 10/25/2018    Full dentures     Hiatal hernia     History of methicillin resistant staphylococcus aureus (MRSA)     10/11/2018 MRSA (nares) positive    Hypertension     Irritable bowel syndrome     Kidney stone     at least 7 episodes    Liver disease     Alpha 1- enzyme deficiency - diagnosed 2002  has been on weekly replacement therapy since then    Pulmonary emphysema ([facility] [address] )     1/25/15  FEV1 - 2 45 liters or 59% of predicted    RSV infection 12/2017    Wears glasses     for driving only     Past Surgical History:   Procedure Laterality Date    BACK SURGERY  2008    discectomy    CARDIAC CATHETERIZATION N/A 5/3/2022    Procedure: Cardiac catheterization both left and right heart catheterization with vaso dilatory trial;  Surgeon: Shaji Rosario MD;  Location: [place] [address] CATH LAB; Service: Cardiology    CARDIAC CATHETERIZATION Left 5/3/2022    Procedure: Cardiac Left Heart Cath;  Surgeon: Shaji Rosario MD;  Location: [place] [address] CATH LAB; Service: Cardiology    CARDIAC CATHETERIZATION Left 5/3/2022    Procedure: Cardiac Left Ventriculogram;  Surgeon: Shaji Rosario MD;  Location: [place] [address] CATH LAB; Service: Cardiology    COLONOSCOPY      COLONOSCOPY N/A 3/10/2017    Procedure: Ebonie Giordano;  Surgeon: Vitaliy Roberts MD;  Location: [facility] GI LAB; Service:    Shyanne Mendez      removal of kidney stones    ESOPHAGOGASTRODUODENOSCOPY N/A 3/10/2017    Procedure: ESOPHAGOGASTRODUODENOSCOPY (EGD); Surgeon: Vitaliy Roberts MD;  Location: [facility] GI LAB; Service:     LITHOTRIPSY      VA ESOPHAGOGASTRODUODENOSCOPY TRANSORAL DIAGNOSTIC N/A 11/20/2018    Procedure: ESOPHAGOGASTRODUODENOSCOPY (EGD); Surgeon: Vitaliy Roberts MD;  Location: [facility] GI LAB;   Service: Gastroenterology    TONSILLECTOMY      VEIN LIGATION AND STRIPPING Bilateral 1980's     Allergies   Allergen Reactions    Chantix [Varenicline]      Caused prostate infection          History of Present Illness     Bonnie Yoder is a 65year old male admitted to [address] [place] [address] on 6/9/2022 for STR  Past Medical Hx including but not limited to Emphysema with chronic resp failure on home O2, HTN, Parkinson's Disease, Peripheral neuropathy, BPH, Ambulatory dysfunction, Depression/Anxiety, Congnitive impairment  seen in collaboration with nursing for medical mgmt and STR follow up  Hospital Course:   Presented to [address] [place] [address] 6/5/2022-6/9/2022 with a COPD exacerbation  Patient had c o 3 days worsening SOB with increased cough and needing to use his nebulizer treatments more frequently  He was treated with IV solumedrol with transition to PO prednisone taper  Due to patient's hx of parkinson's disease with concerns for dysphagia and balance problems Neurology was consulted as well as Speech Pathology  He was recommended a Video Barium Swallow study and later a Level 3 soft diet with thin liquids  Neurology recommended continuing Sinemet as his mobility improved after staring this medication  He was recommended STR at d/c from hospital        Rehab:   Seen and examined at bedside today  Patient is a reliable historian  He is oob working with PT  He states he has chronic shortness of breath with exertion  He states he feels he is doing well on 3L NC  He states he has a minimum cough without any mucus production  He states he is sleeping at night  He states he is eager to go home later this week  He denies CP/N/V/D, Denies lightheadedness, dizziness, headaches, vision changes  Patient states they are eating well and staying hydrated  Denies any bowel or bladder issues  Per review of SNF records, Patient is eating 3 meals per day, consuming %  Last documented BM was 6/20/2022  Patient is actively participating in therapy, he requires assist x 1 with ADLs and transfers   No concerns from nursing at this time  The patient's allergies, past medical, surgical, social and family history were reviewed and unchanged  Review of Systems     Review of Systems   Constitutional: Negative for activity change, appetite change, fatigue and fever  HENT: Negative for ear pain, rhinorrhea and trouble swallowing  Eyes: Negative for pain and visual disturbance  Respiratory: Positive for shortness of breath (chronic )  Negative for cough and wheezing  Cardiovascular: Negative for chest pain and palpitations  Gastrointestinal: Negative for abdominal distention, abdominal pain, blood in stool, constipation, diarrhea, nausea and vomiting  Genitourinary: Negative for decreased urine volume, difficulty urinating, dysuria, frequency and hematuria  Musculoskeletal: Positive for gait problem  Negative for arthralgias and back pain  Skin: Negative for color change and rash  Neurological: Negative for dizziness, syncope, weakness, light-headedness, numbness and headaches  Psychiatric/Behavioral: Negative for dysphoric mood and sleep disturbance  Objective     Vitals:   Vitals:    06/20/22 1451   BP: 132/78   Pulse: 66   Resp: 18   Temp: 97 5 °F (36 4 °C)   SpO2: 96%         Physical Exam  Vitals and nursing note reviewed  Constitutional:       General: He is not in acute distress  Appearance: Normal appearance  HENT:      Head: Normocephalic and atraumatic  Nose: No congestion or rhinorrhea  Mouth/Throat:      Mouth: Mucous membranes are moist    Eyes:      General: No scleral icterus  Extraocular Movements: Extraocular movements intact  Conjunctiva/sclera: Conjunctivae normal       Pupils: Pupils are equal, round, and reactive to light  Cardiovascular:      Rate and Rhythm: Normal rate and regular rhythm  Pulses: Normal pulses  Heart sounds: Normal heart sounds  No murmur heard    Pulmonary:      Effort: Pulmonary effort is normal  Breath sounds: Normal breath sounds  No wheezing, rhonchi or rales  Comments: SOB with exertion on 3L NC   Abdominal:      General: Bowel sounds are normal  There is no distension  Palpations: Abdomen is soft  Tenderness: There is no abdominal tenderness  Musculoskeletal:         General: No swelling or signs of injury  Lymphadenopathy:      Cervical: No cervical adenopathy  Skin:     General: Skin is warm and dry  Findings: No erythema  Neurological:      Mental Status: He is alert and oriented to person, place, and time  Mental status is at baseline  Sensory: No sensory deficit  Motor: Weakness present  Gait: Gait abnormal    Psychiatric:         Mood and Affect: Mood normal          Behavior: Behavior normal          Pertinent Laboratory/Diagnostic Studies:   Reviewed in facility chart-stable      Current Medications   Medications reviewed and updated see facility [facility] for details  Current Outpatient Medications:     amLODIPine (NORVASC) 10 mg tablet, TAKE ONE TABLET BY MOUTH DAILY , Disp: 30 tablet, Rfl: 6    budesonide (PULMICORT) 0 5 mg/2 mL nebulizer solution, Take 2 mL (0 5 mg total) by nebulization in the morning and 2 mL (0 5 mg total) in the evening , Disp: 360 mL, Rfl: 11    busPIRone (BUSPAR) 10 mg tablet, TAKE ONE TABLET BY MOUTH THREE TIMES DAILY, Disp: 90 tablet, Rfl: 0    carbidopa-levodopa (SINEMET)  mg per tablet, Take 1 tablet by mouth in the morning and 1 tablet in the evening and 1 tablet before bedtime  , Disp: 90 tablet, Rfl: 0    cholestyramine (QUESTRAN) 4 g packet, Take 1 packet (4 g total) by mouth in the morning , Disp: 60 each, Rfl: 0    Diclofenac Sodium (VOLTAREN) 1 %, Apply 2 g topically 4 (four) times a day for 7 days Apply to R knee (Patient not taking: Reported on 5/23/2022), Disp: 56 g, Rfl: 0    finasteride (PROSCAR) 5 mg tablet, TAKE ONE TABLET BY MOUTH IN THE MORNING , Disp: 90 tablet, Rfl: 3    fluticasone (FLONASE) 50 mcg/act nasal spray, 2 sprays into each nostril daily, Disp: 16 g, Rfl: 5    gabapentin (NEURONTIN) 300 mg capsule, TAKE 1 CAPSULE BY MOUTH 3 TIMES DAILY, Disp: 90 capsule, Rfl: 0    ipratropium-albuterol (DUO-NEB) 0 5-2 5 mg/3 mL nebulizer solution, Take 3 mL by nebulization 4 (four) times a day, Disp: 360 mL, Rfl: 5    metoprolol tartrate (LOPRESSOR) 25 mg tablet, Take 1 tablet (25 mg total) by mouth every 12 (twelve) hours, Disp: 60 tablet, Rfl: 0    midodrine (PROAMATINE) 10 MG tablet, Take 1 tab three times daily  Please hold medication if blood pressure is above 754 systolic , Disp: 90 tablet, Rfl: 4    mirtazapine (REMERON) 15 mg tablet, Take 2 tablets (30 mg total) by mouth daily at bedtime, Disp: 30 tablet, Rfl: 5    OXYGEN-HELIUM IN, Inhale 2L at rest- 3L with activity, Disp: , Rfl:     pantoprazole (PROTONIX) 40 mg tablet, TAKE ONE TABLET BY MOUTH TWICE DAILY, Disp: 180 tablet, Rfl: 0    tamsulosin (FLOMAX) 0 4 mg, TAKE ONE CAPSULE BY MOUTH ONE TIME DAILY, Disp: 90 capsule, Rfl: 1    Ventolin  (90 Base) MCG/ACT inhaler, Inhale 2 puffs every 4 (four) hours as needed for wheezing, Disp: 18 g, Rfl: 5    ZEMAIRA infusion, once a week , Disp: , Rfl:      Plan discussed with Dr Virgie Trejo noted agreement with assessment and plan  Please note:  Voice-recognition software may have been used in the preparation of this document  Occasional wrong word or "sound-alike" substitutions may have occurred due to the inherent limitations of voice recognition software  Interpretation should be guided by TOPHER Guerra  6/20/2022  2:51 PM

## 2022-06-20 NOTE — ASSESSMENT & PLAN NOTE
· Multifactorial due to advanced age, parkinson's disease, generalized weakness, falls, orthostatic hypotension   · Fall Precautions  · Stabilize BP  · Ensure proper nutrition/hydration   ·  following for d/c planning

## 2022-06-20 NOTE — ASSESSMENT & PLAN NOTE
· Chronic and progressive  · Continue Carbidopa-Levodopa  mg TID  · Having difficulty swallowing while inpatient   · Speech recommends Dysphagia level 3 diet with Thin liquids  · Aspiration Precautions  · Fall Precautions   · OP f/u with Neurology as scheduled

## 2022-06-20 NOTE — ASSESSMENT & PLAN NOTE
· /78 HR 66- Stable  · Continue Norvasc 10 mg daily  · Metoprolol Tartrate 25 mg BID  · Continue Midodrine 10 mg TID with hold parameters

## 2022-06-22 ENCOUNTER — NURSING HOME VISIT (OUTPATIENT)
Dept: GERIATRICS | Facility: OTHER | Age: 77
End: 2022-06-22
Payer: MEDICARE

## 2022-06-22 VITALS
RESPIRATION RATE: 18 BRPM | BODY MASS INDEX: 21.76 KG/M2 | WEIGHT: 183.5 LBS | DIASTOLIC BLOOD PRESSURE: 88 MMHG | TEMPERATURE: 97.5 F | HEART RATE: 78 BPM | SYSTOLIC BLOOD PRESSURE: 153 MMHG

## 2022-06-22 DIAGNOSIS — R39.16 BENIGN PROSTATIC HYPERPLASIA (BPH) WITH STRAINING ON URINATION: ICD-10-CM

## 2022-06-22 DIAGNOSIS — R26.2 AMBULATORY DYSFUNCTION: ICD-10-CM

## 2022-06-22 DIAGNOSIS — N17.9 AKI (ACUTE KIDNEY INJURY) (HCC): Primary | ICD-10-CM

## 2022-06-22 DIAGNOSIS — J96.11 CHRONIC RESPIRATORY FAILURE WITH HYPOXIA (HCC): Chronic | ICD-10-CM

## 2022-06-22 DIAGNOSIS — I10 ESSENTIAL HYPERTENSION: ICD-10-CM

## 2022-06-22 DIAGNOSIS — I10 ORTHOSTATIC HYPERTENSION: ICD-10-CM

## 2022-06-22 DIAGNOSIS — N40.1 BENIGN PROSTATIC HYPERPLASIA (BPH) WITH STRAINING ON URINATION: ICD-10-CM

## 2022-06-22 DIAGNOSIS — G20 PARKINSON'S DISEASE (HCC): ICD-10-CM

## 2022-06-22 PROCEDURE — 99316 NF DSCHRG MGMT 30 MIN+: CPT | Performed by: NURSE PRACTITIONER

## 2022-06-22 RX ORDER — MIDODRINE HYDROCHLORIDE 10 MG/1
TABLET ORAL
Qty: 90 TABLET | Refills: 4
Start: 2022-06-22

## 2022-06-22 RX ORDER — GABAPENTIN 100 MG/1
200 CAPSULE ORAL 2 TIMES DAILY
Qty: 120 CAPSULE | Refills: 0 | Status: SHIPPED | OUTPATIENT
Start: 2022-06-22

## 2022-06-22 NOTE — ASSESSMENT & PLAN NOTE
· Multifactorial due to advanced age, parkinson's disease, generalized weakness, falls, orthostatic hypotension   · Fall Precautions  · Stabilize BP  · Ensure proper nutrition/hydration   · Continue PT/OT with Home health services   ·  following for d/c planning - per last note patient will d/c home 6/23/22 with [address] [facility] health SN,Aides and PT/OT

## 2022-06-22 NOTE — PROGRESS NOTES
[facility]  [address]  [phone]  [facility]: [place]  Code 32    ADDENDUM: Received BMP results- patient in CLAYTON- needs medical workup- Ordered STAT repeat BMP with kidney functions stable and consistent with his baseline - Ok to d/c as planned   TOPHER Brooks  06/23/22  12:52 PM      NAME: Ольга Ferreira  AGE: 65 y o  SEX: male   CODE STATUS: CPR    DATE OF ADMISSION: 6/9/2022   DATE OF DISCHARGE: 6/23/2022   DISCHARGE DISPOSITION: Stable for discharge to home with family support and home health PT/OT/SN services  Reason for Admission: Patient was [facility] to [facility] for [facility] after hospitalization for COPD exacerbation  Past Medical and Surgical History:   Past Medical History:   Diagnosis Date    Anesthesia complication     Difficult to wake up    Arthritis     BPH (benign prostatic hyperplasia)     urinary frequency    Cancer (HCC)     basal cell neck, face    COPD (chronic obstructive pulmonary disease) (HCC)     COPD exacerbation (HCC) 12/29/2019    Elevated PSA 10/25/2018    Full dentures     Hiatal hernia     History of methicillin resistant staphylococcus aureus (MRSA)     10/11/2018 MRSA (nares) positive    Hypertension     Irritable bowel syndrome     Kidney stone     at least 7 episodes    Liver disease     Alpha 1- enzyme deficiency - diagnosed 2002  has been on weekly replacement therapy since then    Pulmonary emphysema ([address] )     1/25/15  FEV1 - 2 45 liters or 59% of predicted    RSV infection 12/2017    Wears glasses     for driving only      Past Surgical History:   Procedure Laterality Date    BACK SURGERY  2008    discectomy    CARDIAC CATHETERIZATION N/A 5/3/2022    Procedure: Cardiac catheterization both left and right heart catheterization with vaso dilatory trial;  Surgeon: Shaji Rosario MD;  Location: [place] [address] CATH LAB;   Service: Cardiology    CARDIAC CATHETERIZATION Left 5/3/2022 Procedure: Cardiac Left Heart Cath;  Surgeon: Shaji Rosario MD;  Location: [place] [address] CATH LAB; Service: Cardiology    CARDIAC CATHETERIZATION Left 5/3/2022    Procedure: Cardiac Left Ventriculogram;  Surgeon: Shaji Rosario MD;  Location: [place] [address] CATH LAB; Service: Cardiology    COLONOSCOPY      COLONOSCOPY N/A 3/10/2017    Procedure: Ebonie Giordano;  Surgeon: Vitaliy Roberts MD;  Location: [place] [address] GI LAB; Service:    Jerl Meth      removal of kidney stones    ESOPHAGOGASTRODUODENOSCOPY N/A 3/10/2017    Procedure: ESOPHAGOGASTRODUODENOSCOPY (EGD); Surgeon: Vitaliy Roberts MD;  Location: [facility] GI LAB; Service:     LITHOTRIPSY      HI ESOPHAGOGASTRODUODENOSCOPY TRANSORAL DIAGNOSTIC N/A 11/20/2018    Procedure: ESOPHAGOGASTRODUODENOSCOPY (EGD); Surgeon: Vitaliy Roberts MD;  Location: [facility] GI LAB; Service: Gastroenterology    TONSILLECTOMY      VEIN LIGATION AND STRIPPING Bilateral     1980's       Course of stay:   Alex Baeza is a 65year old male admitted to [address] [place] [address] on 6/9/2022 for STR  Past Medical Hx including but not limited to Emphysema with chronic resp failure on home O2, HTN, Parkinson's Disease, Peripheral neuropathy, BPH, Ambulatory dysfunction, Depression/Anxiety, Congnitive impairment  Patient was seen in collaboration with nursing for medical mgmt and discharge visit today  Hospital Course:   Presented to [address] [place] [address] 6/5/2022-6/9/2022 with a COPD exacerbation  Patient had c o 3 days worsening SOB with increased cough and needing to use his nebulizer treatments more frequently  He was treated with IV solumedrol with transition to PO prednisone taper  Due to patient's hx of parkinson's disease with concerns for dysphagia and balance problems Neurology was consulted as well as Speech Pathology  He was recommended a Video Barium Swallow study and later a Level 3 soft diet with thin liquids   Neurology recommended continuing Sinemet as his mobility improved after staring this medication  He was recommended STR at d/c from hospital        Rehab:   Seen and examined at bedside today  Patient is a reliable historian  He is sitting on edge of bed working with OT  He states his breathing is at baseline, he is on 3L NC- sats 96%  He does not appear in any distress  He states he does not sleep well, especially here at rehab and he is eager to go home tomorrow  He is aware that home health services have been arranged  He denies CP/Cough/Palpitations/Lightheadedness/Dizziness  He states he is eating and drinking well, denies any N/V/D  He denies any bowel or bladder issues  Per review of SNF records, Patient is eating 3 meals per day, consuming %  Last documented BM was 6/20/2022  Patient has actively participated in therapy, he requires min assist x 1 with ADLs and transfers independently  No reported falls during STR stay  No concerns from nursing at this time  ROS:  Review of Systems   Constitutional: Negative for activity change, appetite change, fatigue and fever  HENT: Negative for ear pain, rhinorrhea and trouble swallowing  Eyes: Negative for pain and visual disturbance  Respiratory: Positive for shortness of breath (chronic )  Negative for cough and wheezing  Cardiovascular: Negative for chest pain and palpitations  Gastrointestinal: Negative for abdominal distention, abdominal pain, blood in stool, constipation, diarrhea, nausea and vomiting  Genitourinary: Negative for decreased urine volume, difficulty urinating, dysuria, frequency and hematuria  Musculoskeletal: Positive for gait problem  Negative for arthralgias and back pain  Skin: Negative for color change and rash  Neurological: Negative for dizziness, syncope, weakness, light-headedness, numbness and headaches  Psychiatric/Behavioral: Negative for dysphoric mood and sleep disturbance         PHYSICAL EXAM:  VITALS:   Vitals:    06/22/22 0900   BP: 153/88   Pulse: 78 Resp: 18   Temp: 97 5 °F (36 4 °C)        Physical Exam  Vitals and nursing note reviewed  Constitutional:       General: He is not in acute distress  Appearance: Normal appearance  HENT:      Head: Normocephalic and atraumatic  Nose: No congestion or rhinorrhea  Mouth/Throat:      Mouth: Mucous membranes are moist    Eyes:      General: No scleral icterus  Extraocular Movements: Extraocular movements intact  Conjunctiva/sclera: Conjunctivae normal       Pupils: Pupils are equal, round, and reactive to light  Cardiovascular:      Rate and Rhythm: Normal rate and regular rhythm  Pulses: Normal pulses  Heart sounds: Normal heart sounds  No murmur heard  Pulmonary:      Effort: Pulmonary effort is normal       Breath sounds: Normal breath sounds  No wheezing, rhonchi or rales  Comments: SOB with exertion on 3L NC   Abdominal:      General: Bowel sounds are normal  There is no distension  Palpations: Abdomen is soft  Tenderness: There is no abdominal tenderness  Musculoskeletal:         General: No swelling or signs of injury  Lymphadenopathy:      Cervical: No cervical adenopathy  Skin:     General: Skin is warm and dry  Findings: No erythema  Neurological:      Mental Status: He is alert and oriented to person, place, and time  Mental status is at baseline  Sensory: No sensory deficit  Motor: Weakness present  Gait: Gait abnormal    Psychiatric:         Mood and Affect: Mood normal          Behavior: Behavior normal          Admission Diagnoses:   1   CLAYTON (acute kidney injury) ([facility] )  Assessment & Plan:  · Baseline Creatinine 1 0-1 1  · BUN/Cret 75/2 15 GFR 31 today   · Previous Kidney function on 6/9/22 was BUN/Cret 38/1 11 GFR63  · ATN vs Nephritis vs Retention  · Bladder scan zero - voiding without difficulty   · On High amount Gabapentin 300 mg TID- Decrease Gabapentin to 200 mg Q 12hrs   · On Midodrine 10 mg TID with hold parameters for hx of orthostatic hypotension- Change Midodrine to PRN as this is likely also contributing to CLAYTON    Patient is eating and drinking well- completing 100% of his trays  I have asked for a STAT repeat BMP to verify results  If true CLAYTON- Will Cancel Discharge to home until CLAYTON resolves  2  Chronic respiratory failure with hypoxia (HCC)  Assessment & Plan:  · Due to Alpha-1 Antritryupsin deficincy and Centrilobular emphysema  · On Chronic 3L NC at home  · Recent Exacerbation with hospitlization requireing IV Steroids with transition to PO taper  · Completed Prednisone taper at [facility]  · Good Sats at [facility]  · Lungs clear on exam   · Continue Pulmicort BID  · Continue Flonase Daily   · Continue Duo-Neb Q 6 hrs   · Albuterol HFA PRN for wheezing/sob     · CBC and BMP done today- Stable and WNL  · Cleared mediacally for d/c to home         3  Parkinson's disease ([facility] )  Assessment & Plan:  · Chronic and progressive  · Continue Carbidopa-Levodopa  mg TID  · With associated orthostatic hypotension- has been receiving Midodrine 10 mg TID with hold parameters - BP today 155/86 with new CLAYTON- Change Midodrine to PRN for hypotension   · Having difficulty swallowing while inpatient   · Speech recommends Dysphagia level 3 diet with Thin liquids  · Aspiration Precautions  · Fall Precautions   · OP f/u with Neurology as scheduled     Orders:  -     carbidopa-levodopa (SINEMET)  mg per tablet; Take 1 tablet by mouth 3 (three) times a day  -     gabapentin (NEURONTIN) 100 mg capsule; Take 2 capsules (200 mg total) by mouth 2 (two) times a day    4  Benign prostatic hyperplasia (BPH) with straining on urination  Assessment & Plan:  · Chronic - Stable   · Denies symptoms on exam today   · New CLAYTON on labs today   · Bladder Scan Zero - 15 mins after urinating   · Continue Flomax and Proscar         5   Essential hypertension  Assessment & Plan:  · Stable Current /86  · Continue Norvasc 10 mg daily  · Metoprolol Tartrate 25 mg BID  · Change Midodrine 10 mg TID with hold parameters to PRN in light of new CLYATON  · OP f/u with PCP at d/c from [facility]  · Lea Delarosa to monitor     Orders:  -     metoprolol tartrate (LOPRESSOR) 25 mg tablet; Take 1 tablet (25 mg total) by mouth every 12 (twelve) hours    6  Ambulatory dysfunction  Assessment & Plan:  · Multifactorial due to advanced age, parkinson's disease, generalized weakness, falls, orthostatic hypotension   · Fall Precautions  · Stabilize BP  · Ensure proper nutrition/hydration   · Continue PT/OT with Home health services   ·  following for d/c planning - per last note patient will d/c home 6/23/22 with [facility] SN,Aides and PT/OT       7  Orthostatic hypertension  -     midodrine (PROAMATINE) 10 MG tablet; Take 1 tab three times daily AS NEEDED if SBP less than 100  Please hold medication if blood pressure is above 323 systolic         Follow-up Recommendations:     Outpatient Follow up with PCP in the next 2 weeks  [address] [place] [address] PT/OT/SN services    Palliative care appointment scheduled 7/5/2022    Pulmonary appointment scheduled 7/6/2022  [facility] appointment scheduled 9/20/2022    Labs and testing performed during stay:    Collection Date:06/22/2022 48:66  BASIC METABOLIC PNL  GLUCOSE 467 mg/dL 65-99 H Final  BUN 75 mg/dL 7-28 H Final  CREATININE 2 15 mg/dL 0 53-1 30 H Final  SODIUM 139 mmol/L 135-145 Final  POTASSIUM 4 3 mmol/L 3 5-5 2 Final  CHLORIDE 108 mmol/L 100-109 Final  CARBON DIOXIDE 24 mmol/L 23-31 Final  CALCIUM 8 3 mg/dL 8 5-10 1 L Final  ANION GAP 7 3-11 Final  eGFRcr 31 >59 L Final      Collection Date:06/22/2022 04:10  CBC NO DIFF  HEMOGLOBIN 10 5 g/dL 12 5-17 0 L Final  HEMATOCRIT 31 3 % 37 0-48 0 L Final  WBC 7 8 thou/cmm 4 0-10 5 Final  RBC 3 42 mill/cmm 4 00-5 40 L Final  PLATELET COUNT 621 thou/cmm 140-350 Final  MPV 9 5 fL 7 5-11 3 Final  MCV 91 fL  Final  MCH 30 6 pg 27 0-36 0 Final  MCHC 33 4 g/dL 32 0-37 0 Final  RDW 14 3 % 12 0-16 0 Final    Collection Date:05/09/2022 70:11  BASIC METABOLIC PNL  GLUCOSE 010 mg/dL 65-99 H Final  BUN 27 mg/dL 7-28 Final  CREATININE 0 91 mg/dL 0 53-1 30 Final  SODIUM 141 mmol/L 135-145 Final  POTASSIUM 4 5 mmol/L 3 5-5 2 Final  CHLORIDE 106 mmol/L 100-109 Final  CARBON DIOXIDE 28 mmol/L 23-31 Final  CALCIUM 9 0 mg/dL 8 5-10 1 Final  ANION GAP 7 3-11 Final  eGFRcr 86 >59 Final    Discharge Medications: See discharge medication list which was reviewed and signed  Current Outpatient Medications:     carbidopa-levodopa (SINEMET)  mg per tablet, Take 1 tablet by mouth 3 (three) times a day, Disp: 90 tablet, Rfl: 0    gabapentin (NEURONTIN) 100 mg capsule, Take 2 capsules (200 mg total) by mouth 2 (two) times a day, Disp: 120 capsule, Rfl: 0    metoprolol tartrate (LOPRESSOR) 25 mg tablet, Take 1 tablet (25 mg total) by mouth every 12 (twelve) hours, Disp: 60 tablet, Rfl: 0    midodrine (PROAMATINE) 10 MG tablet, Take 1 tab three times daily AS NEEDED if SBP less than 100   Please hold medication if blood pressure is above 559 systolic , Disp: 90 tablet, Rfl: 4    amLODIPine (NORVASC) 10 mg tablet, TAKE ONE TABLET BY MOUTH DAILY , Disp: 30 tablet, Rfl: 6    budesonide (PULMICORT) 0 5 mg/2 mL nebulizer solution, Take 2 mL (0 5 mg total) by nebulization in the morning and 2 mL (0 5 mg total) in the evening , Disp: 360 mL, Rfl: 11    busPIRone (BUSPAR) 10 mg tablet, TAKE ONE TABLET BY MOUTH THREE TIMES DAILY, Disp: 90 tablet, Rfl: 0    cholestyramine (QUESTRAN) 4 g packet, Take 1 packet (4 g total) by mouth in the morning , Disp: 60 each, Rfl: 0    Diclofenac Sodium (VOLTAREN) 1 %, Apply 2 g topically 4 (four) times a day for 7 days Apply to R knee (Patient not taking: Reported on 5/23/2022), Disp: 56 g, Rfl: 0    finasteride (PROSCAR) 5 mg tablet, TAKE ONE TABLET BY MOUTH IN THE MORNING , Disp: 90 tablet, Rfl: 3    fluticasone (FLONASE) 50 mcg/act nasal spray, 2 sprays into each nostril daily, Disp: 16 g, Rfl: 5    ipratropium-albuterol (DUO-NEB) 0 5-2 5 mg/3 mL nebulizer solution, Take 3 mL by nebulization 4 (four) times a day, Disp: 360 mL, Rfl: 5    mirtazapine (REMERON) 15 mg tablet, Take 2 tablets (30 mg total) by mouth daily at bedtime, Disp: 30 tablet, Rfl: 5    OXYGEN-HELIUM IN, Inhale 2L at rest- 3L with activity, Disp: , Rfl:     pantoprazole (PROTONIX) 40 mg tablet, TAKE ONE TABLET BY MOUTH TWICE DAILY, Disp: 180 tablet, Rfl: 0    tamsulosin (FLOMAX) 0 4 mg, TAKE ONE CAPSULE BY MOUTH ONE TIME DAILY, Disp: 90 capsule, Rfl: 1    Ventolin  (90 Base) MCG/ACT inhaler, Inhale 2 puffs every 4 (four) hours as needed for wheezing, Disp: 18 g, Rfl: 5    ZEMAIRA infusion, once a week , Disp: , Rfl:      Discussion with patient/family and further instructions:  -Fall precautions  -Aspiration precautions  -Bleeding precautions  -Monitor for signs/symptoms of infection  -Medication list was reviewed and updated    Status at time of discharge: Stable    Plan discussed with Dr Mary Mitchell noted agreement with assessment and plan  Billing based on time  Time spent on unit, 40 minutes  Time spent counseling pt on debility/condition, 30 minutes  Please note:  Voice-recognition software may have been used in the preparation of this document  Occasional wrong word or "sound-alike" substitutions may have occurred due to the inherent limitations of voice recognition software  Interpretation should be guided by context          TOPHER Marroquin  6/22/2022

## 2022-06-22 NOTE — ASSESSMENT & PLAN NOTE
· Due to Alpha-1 Antritryupsin deficincy and Centrilobular emphysema  · On Chronic 3L NC at home  · Recent Exacerbation with hospitlization requireing IV Steroids with transition to PO taper  · Completed Prednisone taper at SNF  · Good Sats at SNF  · Lungs clear on exam   · Continue Pulmicort BID  · Continue Flonase Daily   · Continue Duo-Neb Q 6 hrs   · Albuterol HFA PRN for wheezing/sob     · CBC and BMP done today- Stable and WNL  · Cleared mediacally for d/c to home

## 2022-06-22 NOTE — ASSESSMENT & PLAN NOTE
· Stable Current /86  · Continue Norvasc 10 mg daily  · Metoprolol Tartrate 25 mg BID  · Change Midodrine 10 mg TID with hold parameters to PRN   · OP f/u with PCP at d/c from [facility]  · Lea 39 to monitor

## 2022-06-22 NOTE — ASSESSMENT & PLAN NOTE
· Chronic - Stable   · Denies symptoms on exam today   · Bladder Scan Zero - 15 mins after urinating   · Continue Flomax and Proscar

## 2022-06-22 NOTE — ASSESSMENT & PLAN NOTE
· Chronic and progressive  · Continue Carbidopa-Levodopa  mg TID  · With associated orthostatic hypotension- has been receiving Midodrine 10 mg TID with hold parameters - BP today 155/86- Change Midodrine to PRN   · Having difficulty swallowing while inpatient   · Speech recommends Dysphagia level 3 diet with Thin liquids  · Aspiration Precautions  · Fall Precautions   · OP f/u with Neurology as scheduled

## 2022-06-22 NOTE — ASSESSMENT & PLAN NOTE
· Baseline Creatinine 1 0-1 1  · BUN/Cret 75/2 15 GFR 31 today   · Previous Kidney function on 6/9/22 was BUN/Cret 38/1 11 GFR63  · ATN vs Nephritis vs Retention  · Bladder scan zero - voiding without difficulty   · On High amount Gabapentin 300 mg TID- Decrease Gabapentin to 200 mg Q 12hrs   · On Midodrine 10 mg TID with hold parameters for hx of orthostatic hypotension- Change Midodrine to PRN as this is likely also contributing to CLAYTON    Patient is eating and drinking well- completing 100% of his trays  I have asked for a STAT repeat BMP to verify results  If true CLAYTON- Will Cancel Discharge to home until CLAYTON resolves         UPDATE: STAT repeat BMP with Creat at baseline 1 07- lab error- ok to d/c home as planned

## 2022-06-23 ENCOUNTER — TRANSCRIBE ORDERS (OUTPATIENT)
Dept: HOME HEALTH SERVICES | Facility: HOME HEALTHCARE | Age: 77
End: 2022-06-23

## 2022-06-23 ENCOUNTER — PATIENT OUTREACH (OUTPATIENT)
Dept: FAMILY MEDICINE CLINIC | Facility: CLINIC | Age: 77
End: 2022-06-23

## 2022-06-23 DIAGNOSIS — N40.1 BENIGN PROSTATIC HYPERPLASIA (BPH) WITH STRAINING ON URINATION: ICD-10-CM

## 2022-06-23 DIAGNOSIS — J96.11 CHRONIC RESPIRATORY FAILURE WITH HYPOXIA (HCC): ICD-10-CM

## 2022-06-23 DIAGNOSIS — I10 ESSENTIAL HYPERTENSION: ICD-10-CM

## 2022-06-23 DIAGNOSIS — R39.16 BENIGN PROSTATIC HYPERPLASIA (BPH) WITH STRAINING ON URINATION: ICD-10-CM

## 2022-06-23 DIAGNOSIS — J43.2 CENTRILOBULAR EMPHYSEMA (HCC): Primary | ICD-10-CM

## 2022-06-23 DIAGNOSIS — I95.1 ORTHOSTATIC HYPOTENSION: ICD-10-CM

## 2022-06-24 ENCOUNTER — HOME CARE VISIT (OUTPATIENT)
Dept: HOME HEALTH SERVICES | Facility: HOME HEALTHCARE | Age: 77
End: 2022-06-24

## 2022-06-25 ENCOUNTER — HOME CARE VISIT (OUTPATIENT)
Dept: HOME HEALTH SERVICES | Facility: HOME HEALTHCARE | Age: 77
End: 2022-06-25

## 2022-06-25 NOTE — CASE COMMUNICATION
SN went to pts house for [facility] on 6/25/2022  Pt stated he is getting Zemaira infusion through [facility]  SN visit was an assess not admit

## 2022-06-26 ENCOUNTER — TELEPHONE (OUTPATIENT)
Dept: OTHER | Facility: OTHER | Age: 77
End: 2022-06-26

## 2022-06-28 ENCOUNTER — TELEPHONE (OUTPATIENT)
Dept: NEUROLOGY | Facility: CLINIC | Age: 77
End: 2022-06-28

## 2022-06-28 NOTE — TELEPHONE ENCOUNTER
MSW received vm from pt has question about lanta  MSW phoned pt and he informed that he can now is cleared to drive and no longer needs lanta  MSW informed pt that he can still have the benefit and does not have to use it  Pt reported understanding  Pt does not have any additional needs at this time

## 2022-07-01 ENCOUNTER — TELEPHONE (OUTPATIENT)
Dept: FAMILY MEDICINE CLINIC | Facility: CLINIC | Age: 77
End: 2022-07-01

## 2022-07-02 DIAGNOSIS — G20 PARKINSON'S DISEASE (HCC): ICD-10-CM

## 2022-07-02 DIAGNOSIS — J96.11 CHRONIC RESPIRATORY FAILURE WITH HYPOXIA (HCC): ICD-10-CM

## 2022-07-03 ENCOUNTER — HOSPITAL ENCOUNTER (INPATIENT)
Facility: HOSPITAL | Age: 77
LOS: 5 days | Discharge: HOME HEALTH | DRG: 190 | End: 2022-07-09
Attending: EMERGENCY MEDICINE | Admitting: FAMILY MEDICINE
Payer: MEDICARE

## 2022-07-03 ENCOUNTER — APPOINTMENT (EMERGENCY)
Dept: RADIOLOGY | Facility: HOSPITAL | Age: 77
DRG: 190 | End: 2022-07-03
Payer: MEDICARE

## 2022-07-03 DIAGNOSIS — E88.01 ALPHA-1-ANTITRYPSIN DEFICIENCY (HCC): ICD-10-CM

## 2022-07-03 DIAGNOSIS — J44.1 COPD WITH ACUTE EXACERBATION (HCC): Primary | ICD-10-CM

## 2022-07-03 LAB
2HR DELTA HS TROPONIN: 0 NG/L
ALBUMIN SERPL BCP-MCNC: 3.9 G/DL (ref 3.5–5)
ALP SERPL-CCNC: 76 U/L (ref 46–116)
ALT SERPL W P-5'-P-CCNC: 24 U/L (ref 12–78)
ANION GAP SERPL CALCULATED.3IONS-SCNC: 12 MMOL/L (ref 4–13)
AST SERPL W P-5'-P-CCNC: 27 U/L (ref 5–45)
BASOPHILS # BLD AUTO: 0.06 THOUSANDS/ΜL (ref 0–0.1)
BASOPHILS NFR BLD AUTO: 1 % (ref 0–1)
BILIRUB SERPL-MCNC: 1.08 MG/DL (ref 0.2–1)
BUN SERPL-MCNC: 15 MG/DL (ref 5–25)
CALCIUM SERPL-MCNC: 9 MG/DL (ref 8.3–10.1)
CARDIAC TROPONIN I PNL SERPL HS: 9 NG/L
CARDIAC TROPONIN I PNL SERPL HS: 9 NG/L
CHLORIDE SERPL-SCNC: 106 MMOL/L (ref 100–108)
CO2 SERPL-SCNC: 25 MMOL/L (ref 21–32)
CREAT SERPL-MCNC: 1.39 MG/DL (ref 0.6–1.3)
D DIMER PPP FEU-MCNC: 1.37 UG/ML FEU
EOSINOPHIL # BLD AUTO: 0.39 THOUSAND/ΜL (ref 0–0.61)
EOSINOPHIL NFR BLD AUTO: 5 % (ref 0–6)
ERYTHROCYTE [DISTWIDTH] IN BLOOD BY AUTOMATED COUNT: 16.1 % (ref 11.6–15.1)
GFR SERPL CREATININE-BSD FRML MDRD: 48 ML/MIN/1.73SQ M
GLUCOSE SERPL-MCNC: 120 MG/DL (ref 65–140)
HCT VFR BLD AUTO: 39.9 % (ref 36.5–49.3)
HGB BLD-MCNC: 13.7 G/DL (ref 12–17)
IMM GRANULOCYTES # BLD AUTO: 0.04 THOUSAND/UL (ref 0–0.2)
IMM GRANULOCYTES NFR BLD AUTO: 1 % (ref 0–2)
LYMPHOCYTES # BLD AUTO: 3.33 THOUSANDS/ΜL (ref 0.6–4.47)
LYMPHOCYTES NFR BLD AUTO: 43 % (ref 14–44)
MCH RBC QN AUTO: 32.6 PG (ref 26.8–34.3)
MCHC RBC AUTO-ENTMCNC: 34.3 G/DL (ref 31.4–37.4)
MCV RBC AUTO: 95 FL (ref 82–98)
MONOCYTES # BLD AUTO: 0.5 THOUSAND/ΜL (ref 0.17–1.22)
MONOCYTES NFR BLD AUTO: 7 % (ref 4–12)
NEUTROPHILS # BLD AUTO: 3.24 THOUSANDS/ΜL (ref 1.85–7.62)
NEUTS SEG NFR BLD AUTO: 43 % (ref 43–75)
NRBC BLD AUTO-RTO: 0 /100 WBCS
NT-PROBNP SERPL-MCNC: 168 PG/ML
PLATELET # BLD AUTO: 186 THOUSANDS/UL (ref 149–390)
PMV BLD AUTO: 9.3 FL (ref 8.9–12.7)
POTASSIUM SERPL-SCNC: 4.1 MMOL/L (ref 3.5–5.3)
PROT SERPL-MCNC: 7 G/DL (ref 6.4–8.2)
RBC # BLD AUTO: 4.2 MILLION/UL (ref 3.88–5.62)
SODIUM SERPL-SCNC: 143 MMOL/L (ref 136–145)
WBC # BLD AUTO: 7.56 THOUSAND/UL (ref 4.31–10.16)

## 2022-07-03 PROCEDURE — 83880 ASSAY OF NATRIURETIC PEPTIDE: CPT | Performed by: EMERGENCY MEDICINE

## 2022-07-03 PROCEDURE — 36415 COLL VENOUS BLD VENIPUNCTURE: CPT | Performed by: EMERGENCY MEDICINE

## 2022-07-03 PROCEDURE — 71045 X-RAY EXAM CHEST 1 VIEW: CPT

## 2022-07-03 PROCEDURE — 84100 ASSAY OF PHOSPHORUS: CPT | Performed by: STUDENT IN AN ORGANIZED HEALTH CARE EDUCATION/TRAINING PROGRAM

## 2022-07-03 PROCEDURE — 99285 EMERGENCY DEPT VISIT HI MDM: CPT

## 2022-07-03 PROCEDURE — 84484 ASSAY OF TROPONIN QUANT: CPT | Performed by: EMERGENCY MEDICINE

## 2022-07-03 PROCEDURE — 93005 ELECTROCARDIOGRAM TRACING: CPT

## 2022-07-03 PROCEDURE — 85025 COMPLETE CBC W/AUTO DIFF WBC: CPT | Performed by: EMERGENCY MEDICINE

## 2022-07-03 PROCEDURE — 83735 ASSAY OF MAGNESIUM: CPT | Performed by: STUDENT IN AN ORGANIZED HEALTH CARE EDUCATION/TRAINING PROGRAM

## 2022-07-03 PROCEDURE — 85379 FIBRIN DEGRADATION QUANT: CPT | Performed by: EMERGENCY MEDICINE

## 2022-07-03 PROCEDURE — 99284 EMERGENCY DEPT VISIT MOD MDM: CPT | Performed by: EMERGENCY MEDICINE

## 2022-07-03 PROCEDURE — 96374 THER/PROPH/DIAG INJ IV PUSH: CPT

## 2022-07-03 PROCEDURE — 80053 COMPREHEN METABOLIC PANEL: CPT | Performed by: EMERGENCY MEDICINE

## 2022-07-03 RX ORDER — METHYLPREDNISOLONE SODIUM SUCCINATE 125 MG/2ML
125 INJECTION, POWDER, LYOPHILIZED, FOR SOLUTION INTRAMUSCULAR; INTRAVENOUS ONCE
Status: COMPLETED | OUTPATIENT
Start: 2022-07-03 | End: 2022-07-03

## 2022-07-03 RX ADMIN — METHYLPREDNISOLONE SODIUM SUCCINATE 125 MG: 125 INJECTION, POWDER, FOR SOLUTION INTRAMUSCULAR; INTRAVENOUS at 20:46

## 2022-07-03 NOTE — LETTER
To: [id number]  From:  Anshul Chance, [place] [phone]    AVS and SoC for [facility]

## 2022-07-04 ENCOUNTER — APPOINTMENT (EMERGENCY)
Dept: RADIOLOGY | Facility: HOSPITAL | Age: 77
DRG: 190 | End: 2022-07-04
Payer: MEDICARE

## 2022-07-04 PROBLEM — F41.9 ANXIETY: Chronic | Status: ACTIVE | Noted: 2021-04-06

## 2022-07-04 PROBLEM — N18.2 CKD (CHRONIC KIDNEY DISEASE) STAGE 2, GFR 60-89 ML/MIN: Status: ACTIVE | Noted: 2022-07-04

## 2022-07-04 PROBLEM — I27.82 CHRONIC PULMONARY EMBOLISM (HCC): Status: ACTIVE | Noted: 2018-02-14

## 2022-07-04 LAB
4HR DELTA HS TROPONIN: -1 NG/L
CARDIAC TROPONIN I PNL SERPL HS: 8 NG/L
MAGNESIUM SERPL-MCNC: 1.5 MG/DL (ref 1.6–2.6)
PHOSPHATE SERPL-MCNC: 3.1 MG/DL (ref 2.3–4.1)

## 2022-07-04 PROCEDURE — 99223 1ST HOSP IP/OBS HIGH 75: CPT | Performed by: INTERNAL MEDICINE

## 2022-07-04 PROCEDURE — 97110 THERAPEUTIC EXERCISES: CPT

## 2022-07-04 PROCEDURE — 97163 PT EVAL HIGH COMPLEX 45 MIN: CPT

## 2022-07-04 PROCEDURE — G1004 CDSM NDSC: HCPCS

## 2022-07-04 PROCEDURE — 94640 AIRWAY INHALATION TREATMENT: CPT

## 2022-07-04 PROCEDURE — 99222 1ST HOSP IP/OBS MODERATE 55: CPT | Performed by: FAMILY MEDICINE

## 2022-07-04 PROCEDURE — 84484 ASSAY OF TROPONIN QUANT: CPT | Performed by: EMERGENCY MEDICINE

## 2022-07-04 PROCEDURE — 94760 N-INVAS EAR/PLS OXIMETRY 1: CPT

## 2022-07-04 PROCEDURE — 71275 CT ANGIOGRAPHY CHEST: CPT

## 2022-07-04 PROCEDURE — 36415 COLL VENOUS BLD VENIPUNCTURE: CPT | Performed by: EMERGENCY MEDICINE

## 2022-07-04 RX ORDER — FINASTERIDE 5 MG/1
5 TABLET, FILM COATED ORAL EVERY MORNING
Status: DISCONTINUED | OUTPATIENT
Start: 2022-07-04 | End: 2022-07-09 | Stop reason: HOSPADM

## 2022-07-04 RX ORDER — FORMOTEROL FUMARATE 20 UG/2ML
20 SOLUTION RESPIRATORY (INHALATION)
Status: DISCONTINUED | OUTPATIENT
Start: 2022-07-04 | End: 2022-07-09 | Stop reason: HOSPADM

## 2022-07-04 RX ORDER — MIRTAZAPINE 15 MG/1
30 TABLET, FILM COATED ORAL
Status: DISCONTINUED | OUTPATIENT
Start: 2022-07-04 | End: 2022-07-09 | Stop reason: HOSPADM

## 2022-07-04 RX ORDER — CHOLESTYRAMINE 4 G/9G
4 POWDER, FOR SUSPENSION ORAL DAILY
Status: DISCONTINUED | OUTPATIENT
Start: 2022-07-04 | End: 2022-07-04 | Stop reason: CLARIF

## 2022-07-04 RX ORDER — IPRATROPIUM BROMIDE AND ALBUTEROL SULFATE 2.5; .5 MG/3ML; MG/3ML
3 SOLUTION RESPIRATORY (INHALATION) ONCE
Status: DISCONTINUED | OUTPATIENT
Start: 2022-07-04 | End: 2022-07-09 | Stop reason: HOSPADM

## 2022-07-04 RX ORDER — IPRATROPIUM BROMIDE AND ALBUTEROL SULFATE 2.5; .5 MG/3ML; MG/3ML
3 SOLUTION RESPIRATORY (INHALATION)
Status: DISCONTINUED | OUTPATIENT
Start: 2022-07-04 | End: 2022-07-09 | Stop reason: HOSPADM

## 2022-07-04 RX ORDER — BUDESONIDE 0.5 MG/2ML
0.5 INHALANT ORAL 2 TIMES DAILY
Status: DISCONTINUED | OUTPATIENT
Start: 2022-07-04 | End: 2022-07-09 | Stop reason: HOSPADM

## 2022-07-04 RX ORDER — AMLODIPINE BESYLATE 10 MG/1
10 TABLET ORAL DAILY
Status: DISCONTINUED | OUTPATIENT
Start: 2022-07-04 | End: 2022-07-09 | Stop reason: HOSPADM

## 2022-07-04 RX ORDER — BUSPIRONE HYDROCHLORIDE 10 MG/1
10 TABLET ORAL 3 TIMES DAILY
Status: DISCONTINUED | OUTPATIENT
Start: 2022-07-04 | End: 2022-07-09 | Stop reason: HOSPADM

## 2022-07-04 RX ORDER — ENOXAPARIN SODIUM 100 MG/ML
40 INJECTION SUBCUTANEOUS DAILY
Status: DISCONTINUED | OUTPATIENT
Start: 2022-07-04 | End: 2022-07-09 | Stop reason: HOSPADM

## 2022-07-04 RX ORDER — PANTOPRAZOLE SODIUM 40 MG/1
40 TABLET, DELAYED RELEASE ORAL 2 TIMES DAILY
Status: DISCONTINUED | OUTPATIENT
Start: 2022-07-04 | End: 2022-07-09 | Stop reason: HOSPADM

## 2022-07-04 RX ORDER — IPRATROPIUM BROMIDE AND ALBUTEROL SULFATE 2.5; .5 MG/3ML; MG/3ML
3 SOLUTION RESPIRATORY (INHALATION) EVERY 4 HOURS PRN
Status: DISCONTINUED | OUTPATIENT
Start: 2022-07-04 | End: 2022-07-09 | Stop reason: HOSPADM

## 2022-07-04 RX ORDER — POLYETHYLENE GLYCOL 3350 17 G/17G
17 POWDER, FOR SOLUTION ORAL ONCE
Status: COMPLETED | OUTPATIENT
Start: 2022-07-04 | End: 2022-07-04

## 2022-07-04 RX ORDER — FLUTICASONE PROPIONATE 50 MCG
2 SPRAY, SUSPENSION (ML) NASAL DAILY
Status: DISCONTINUED | OUTPATIENT
Start: 2022-07-04 | End: 2022-07-09 | Stop reason: HOSPADM

## 2022-07-04 RX ORDER — TAMSULOSIN HYDROCHLORIDE 0.4 MG/1
0.4 CAPSULE ORAL DAILY
Status: DISCONTINUED | OUTPATIENT
Start: 2022-07-04 | End: 2022-07-09 | Stop reason: HOSPADM

## 2022-07-04 RX ORDER — CHOLESTYRAMINE LIGHT 4 G/5.7G
4 POWDER, FOR SUSPENSION ORAL DAILY
Status: DISCONTINUED | OUTPATIENT
Start: 2022-07-04 | End: 2022-07-09 | Stop reason: HOSPADM

## 2022-07-04 RX ORDER — METOPROLOL TARTRATE 5 MG/5ML
5 INJECTION INTRAVENOUS ONCE
Status: DISCONTINUED | OUTPATIENT
Start: 2022-07-04 | End: 2022-07-04

## 2022-07-04 RX ORDER — METHYLPREDNISOLONE SODIUM SUCCINATE 40 MG/ML
40 INJECTION, POWDER, LYOPHILIZED, FOR SOLUTION INTRAMUSCULAR; INTRAVENOUS EVERY 12 HOURS SCHEDULED
Status: DISCONTINUED | OUTPATIENT
Start: 2022-07-04 | End: 2022-07-06

## 2022-07-04 RX ORDER — GABAPENTIN 100 MG/1
200 CAPSULE ORAL 2 TIMES DAILY
Status: DISCONTINUED | OUTPATIENT
Start: 2022-07-04 | End: 2022-07-09 | Stop reason: HOSPADM

## 2022-07-04 RX ORDER — HYDRALAZINE HYDROCHLORIDE 20 MG/ML
5 INJECTION INTRAMUSCULAR; INTRAVENOUS ONCE
Status: COMPLETED | OUTPATIENT
Start: 2022-07-04 | End: 2022-07-04

## 2022-07-04 RX ORDER — MAGNESIUM SULFATE HEPTAHYDRATE 40 MG/ML
2 INJECTION, SOLUTION INTRAVENOUS ONCE
Status: COMPLETED | OUTPATIENT
Start: 2022-07-04 | End: 2022-07-05

## 2022-07-04 RX ADMIN — BUDESONIDE 0.5 MG: 0.5 INHALANT ORAL at 11:55

## 2022-07-04 RX ADMIN — METOPROLOL TARTRATE 25 MG: 25 TABLET, FILM COATED ORAL at 09:02

## 2022-07-04 RX ADMIN — BUSPIRONE HYDROCHLORIDE 10 MG: 10 TABLET ORAL at 09:03

## 2022-07-04 RX ADMIN — CARBIDOPA AND LEVODOPA 1 TABLET: 25; 100 TABLET ORAL at 16:59

## 2022-07-04 RX ADMIN — MIRTAZAPINE 30 MG: 15 TABLET, FILM COATED ORAL at 23:26

## 2022-07-04 RX ADMIN — POLYETHYLENE GLYCOL 3350 17 G: 17 POWDER, FOR SOLUTION ORAL at 09:09

## 2022-07-04 RX ADMIN — PANTOPRAZOLE SODIUM 40 MG: 40 TABLET, DELAYED RELEASE ORAL at 09:02

## 2022-07-04 RX ADMIN — BUSPIRONE HYDROCHLORIDE 10 MG: 10 TABLET ORAL at 16:59

## 2022-07-04 RX ADMIN — IOHEXOL 70 ML: 300 INJECTION, SOLUTION INTRAVENOUS at 00:35

## 2022-07-04 RX ADMIN — METHYLPREDNISOLONE SODIUM SUCCINATE 40 MG: 40 INJECTION, POWDER, FOR SOLUTION INTRAMUSCULAR; INTRAVENOUS at 23:27

## 2022-07-04 RX ADMIN — CARBIDOPA AND LEVODOPA 1 TABLET: 25; 100 TABLET ORAL at 09:02

## 2022-07-04 RX ADMIN — AMLODIPINE BESYLATE 10 MG: 10 TABLET ORAL at 09:03

## 2022-07-04 RX ADMIN — HYDRALAZINE HYDROCHLORIDE 5 MG: 20 INJECTION, SOLUTION INTRAMUSCULAR; INTRAVENOUS at 03:16

## 2022-07-04 RX ADMIN — IPRATROPIUM BROMIDE AND ALBUTEROL SULFATE 3 ML: 2.5; .5 SOLUTION RESPIRATORY (INHALATION) at 19:56

## 2022-07-04 RX ADMIN — PANTOPRAZOLE SODIUM 40 MG: 40 TABLET, DELAYED RELEASE ORAL at 17:00

## 2022-07-04 RX ADMIN — FLUTICASONE PROPIONATE 2 SPRAY: 50 SPRAY, METERED NASAL at 09:03

## 2022-07-04 RX ADMIN — BUSPIRONE HYDROCHLORIDE 10 MG: 10 TABLET ORAL at 23:27

## 2022-07-04 RX ADMIN — CARBIDOPA AND LEVODOPA 1 TABLET: 25; 100 TABLET ORAL at 23:27

## 2022-07-04 RX ADMIN — ENOXAPARIN SODIUM 40 MG: 40 INJECTION SUBCUTANEOUS at 09:03

## 2022-07-04 RX ADMIN — FINASTERIDE 5 MG: 5 TABLET, FILM COATED ORAL at 09:03

## 2022-07-04 RX ADMIN — IPRATROPIUM BROMIDE AND ALBUTEROL SULFATE 3 ML: 2.5; .5 SOLUTION RESPIRATORY (INHALATION) at 06:02

## 2022-07-04 RX ADMIN — BUDESONIDE 0.5 MG: 0.5 INHALANT ORAL at 19:56

## 2022-07-04 RX ADMIN — METOPROLOL TARTRATE 25 MG: 25 TABLET, FILM COATED ORAL at 23:27

## 2022-07-04 RX ADMIN — FORMOTEROL FUMARATE DIHYDRATE 20 MCG: 20 SOLUTION RESPIRATORY (INHALATION) at 20:15

## 2022-07-04 RX ADMIN — CHOLESTYRAMINE 4 G: 4 POWDER, FOR SUSPENSION ORAL at 09:09

## 2022-07-04 RX ADMIN — METHYLPREDNISOLONE SODIUM SUCCINATE 40 MG: 40 INJECTION, POWDER, FOR SOLUTION INTRAMUSCULAR; INTRAVENOUS at 09:03

## 2022-07-04 RX ADMIN — GABAPENTIN 200 MG: 100 CAPSULE ORAL at 17:00

## 2022-07-04 RX ADMIN — MAGNESIUM SULFATE HEPTAHYDRATE 2 G: 40 INJECTION, SOLUTION INTRAVENOUS at 23:28

## 2022-07-04 RX ADMIN — IPRATROPIUM BROMIDE AND ALBUTEROL SULFATE 3 ML: 2.5; .5 SOLUTION RESPIRATORY (INHALATION) at 11:55

## 2022-07-04 RX ADMIN — GABAPENTIN 200 MG: 100 CAPSULE ORAL at 09:02

## 2022-07-04 RX ADMIN — TAMSULOSIN HYDROCHLORIDE 0.4 MG: 0.4 CAPSULE ORAL at 09:03

## 2022-07-04 NOTE — ASSESSMENT & PLAN NOTE
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

## 2022-07-04 NOTE — ASSESSMENT & PLAN NOTE
Lab Results   Component Value Date    EGFR 58 07/05/2022    EGFR 48 07/03/2022    EGFR 63 06/09/2022    CREATININE 1 19 07/05/2022    CREATININE 1 39 (H) 07/03/2022    CREATININE 1 11 06/09/2022   BL Cr 1 1-1 2  Continue to monitor

## 2022-07-04 NOTE — ASSESSMENT & PLAN NOTE
BP elevated with -190 on admission, missed evening dose of antihypertensives     · Received IV hydralazine 5mg x1  · Continue home norvasc 10mg daily and metoprolol 25mg BID  · Continue to monitor

## 2022-07-04 NOTE — PHYSICAL THERAPY NOTE
PT EVALUATION     07/04/22 1025   Note Type   Note type Evaluation   Pain Assessment   Pain Assessment Tool 0-10   Pain Score No Pain   Restrictions/Precautions   Other Precautions O2;Fall Risk;Bed Alarm; Chair Alarm   Home Living   Type of [address] One level;Stairs to enter with rails  (Two TEMI)   Home Equipment Cane  (RW; Rollator; 3 L home O2 24/7)   Prior Function   Level of [place] Independent with ADLs and functional mobility   Lives With Alone   Receives Help From Family; Other (Comment)  (A person who cleans every other week)   ADL Assistance Independent   IADLs Needs assistance   Comments Pt ambulating with a cane prior to admission   General   Additional Pertinent History Pt is admitted with shortness of breath x a few days diagnosed with COPD exacerbation   Cognition   Overall Cognitive Status WFL   Arousal/Participation Cooperative   Orientation Level Oriented X4   Following Commands Follows multistep commands without difficulty   Subjective   Subjective Better since I came to the hospital   RLE Assessment   RLE Assessment WFL  (3+/5)   LLE Assessment   LLE Assessment WFL  (3+/5)   Transfers   Sit to Stand 4  Minimal assistance   Additional items Assist x 1;Verbal cues   Stand to Sit 4  Minimal assistance   Additional items Assist x 1;Verbal cues   Ambulation/Elevation   Gait pattern Foward flexed  (Flexed trunk, hips and knees; minimal generalized unsteadiness; shaky)   Gait Assistance 4  Minimal assist   Additional items Assist x 1;Verbal cues; Tactile cues   Assistive Device Rolling walker   Distance 12 ft with change in direction; pt on 3 L O2 but is mod/max SOB with limited ambulation needing extended rest time to recover   Balance   Static Sitting Good   Static Standing Fair -   Dynamic Standing Fair -   Ambulatory Fair -   Activity Tolerance   Activity Tolerance Patient limited by fatigue;Treatment limited secondary to medical complications (Comment)  (Shortness of breath) Assessment   Problem List Decreased strength;Decreased range of motion;Decreased endurance; Impaired balance;Decreased mobility; Decreased coordination;Decreased safety awareness   Assessment Patient seen for Physical Therapy evaluation  Patient admitted with COPD with acute exacerbation ([facility] [address] )  Comorbidities affecting patient's physical performance include:  Essential hypertension, BPH, chronic PE, peripheral neuropathy, recurrent depression, anxiety, Parkinson's, CKD, former smoker, chronic respiratory failure with hypoxia, emphysema, ambulatory dysfunction, avascular necrosis of right hip, cognitive impairment  Personal factors affecting patient at time of initial evaluation include: lives in one story house, ambulating with assistive device, stairs to enter home, inability to navigate community distances, inability to navigate level surfaces without external assistance, inability to perform dynamic tasks in community and limited home support  Prior to admission, patient was independent with functional mobility with cane or RW, independent with ADLS, requiring assist for IADLS, living alone in one story home with 2 steps to enter and ambulating household distance  Please find objective findings from Physical Therapy assessment regarding body systems outlined above with impairments and limitations including weakness, decreased ROM, impaired balance, decreased endurance, impaired coordination, gait deviations, decreased activity tolerance, decreased functional mobility tolerance, decreased safety awareness, fall risk and SOB upon exertion  The Barthel Index was used as a functional outcome tool presenting with a score of Barthel Index Score: 50 today indicating marked limitations of functional mobility and ADLS    Patient's clinical presentation is currently unstable/unpredictable as seen in patient's presentation of vital sign response, increased fall risk, new onset of impairment of functional mobility, decreased endurance and new onset of weakness  Pt would benefit from continued Physical Therapy treatment to address deficits as defined above and maximize level of functional mobility  As demonstrated by objective findings, the assigned level of complexity for this evaluation is high  The patient's AM-PAC Basic Mobility Inpatient Short Form Raw Score is 17  A Raw score of greater than 16 suggests the patient may benefit from discharge to home  Please also refer to the recommendation of the Physical Therapist for safe discharge planning  Goals   Patient Goals To go home   STG Expiration Date 07/11/22   Short Term Goal #1 Bed mobility and transfers-S   Short Term Goal #2 Pt will ambulate with a RW functional household distances-S; increase balance to F or greater for safe gait mobility and to decrease fall risk; up/down 2 steps with a railing so pt can enter/exit his home-Min assist in order to meet his goal of going home   LTG Expiration Date 07/18/22   Long Term Goal #1 Bed mobility and transfers-I; pt will ambulate with a RW functional household distances-I   Long Term Goal #2 Balance with a RW will be at least F+ for safe gait mobility and to decrease fall risk; strength bilateral lower extremities 3+ to 4-/5; up/down 2 steps with a railing and close S so pt can enter/exit his home in order to meet his goal of going home   Plan   Treatment/Interventions ADL retraining;Functional transfer training;LE strengthening/ROM; Therapeutic exercise;Elevations; Endurance training;Patient/family training;Equipment eval/education; Bed mobility;Gait training; Compensatory technique education   PT Frequency Other (Comment)  (5x/wk)   Recommendation   PT Discharge Recommendation Home with home health rehabilitation   [phone]   Turning in Bed Without Bedrails 3   Lying on Back to Sitting on Edge of Flat Bed 3   Moving Bed to Chair 3   Standing Up From Chair 3   Walk in Room 3   Climb 3-5 Stairs 2   Basic Mobility Inpatient Raw Score 17   Basic Mobility Standardized Score 39 67   Highest Level Of Mobility   -[facility] Goal 5: Stand one or more mins   -HL Achieved 6: Walk 10 steps or more   Barthel Index   Feeding 5   Bathing 0   Grooming Score 0   Dressing Score 5   Bladder Score 10   Bowels Score 10   Toilet Use Score 5   Transfers (Bed/Chair) Score 10   Mobility (Level Surface) Score 0   Stairs Score 5   Barthel Index Score 50   Additional Treatment Session   Start Time 1025   End Time 1035   Treatment Assessment S:  At home I have a little bit further to walk inside my house    O:  Pt transfers sit to stand with minimal assistance and ambulates with a RW 12-15 feet with change in direction with minimal assistance  Pt is on 3 L O2 and is mod/max SOB at end of ambulation needing increased rest time to recover to baseline  Pt then performed lower extremity exercise as noted below  A:  Pt will continue to benefit from skilled physical therapy services to increase his transfers, gait endurance, strength, balance and functional mobility  Pt is safe for discharge home with home physical therapy services  Exercises   Hip Flexion 10 reps;Bilateral;Sitting   Hip Abduction 10 reps;Bilateral;Sitting   Knee AROM Long Arc Quad 10 reps;Bilateral;Sitting   Ankle Pumps 10 reps;Bilateral;Sitting   End of Consult   Patient Position at End of Consult Bed/Chair alarm activated; Bedside chair; All needs within reach   [facility] Insurance Number  Em Anderson PT [id number]     Portions of the documentation may have been created using voice recognition software  Occasional wrong word or sound alike substitutions may have occurred due to the inherent limitation of the voice recognition software  Read the chart carefully and recognize, using context, where substitutions have occurred

## 2022-07-04 NOTE — PLAN OF CARE
Problem: Potential for Falls  Goal: Patient will remain free of falls  Description: INTERVENTIONS:  - Educate patient/family on patient safety including physical limitations  - Instruct patient to call for assistance with activity   - Consult OT/PT to assist with strengthening/mobility   - Keep Call bell within reach  - Keep bed low and locked with side rails adjusted as appropriate  - Keep care items and personal belongings within reach  - Initiate and maintain comfort rounds  - Make Fall Risk Sign visible to staff  - Apply yellow socks and bracelet for high fall risk patients  - Consider moving patient to room near nurses station  Outcome: Progressing     Problem: Prexisting or High Potential for Compromised Skin Integrity  Goal: Skin integrity is maintained or improved  Description: INTERVENTIONS:  - Identify patients at risk for skin breakdown  - Assess and monitor skin integrity  - Assess and monitor nutrition and hydration status  - Monitor labs   - Assess for incontinence   - Turn and reposition patient  - Assist with mobility/ambulation  - Relieve pressure over bony prominences  - Avoid friction and shearing  - Provide appropriate hygiene as needed including keeping skin clean and dry  - Evaluate need for skin moisturizer/barrier cream  - Collaborate with interdisciplinary team   - Patient/family teaching  - Consider wound care consult   Outcome: Progressing     Problem: MOBILITY - ADULT  Goal: Maintain or return to baseline ADL function  Description: INTERVENTIONS:  -  Assess patient's ability to carry out ADLs; assess patient's baseline for ADL function and identify physical deficits which impact ability to perform ADLs (bathing, care of mouth/teeth, toileting, grooming, dressing, etc )  - Assess/evaluate cause of self-care deficits   - Assess range of motion  - Assess patient's mobility; develop plan if impaired  - Assess patient's need for assistive devices and provide as appropriate  - Encourage maximum independence but intervene and supervise when necessary  - Involve family in performance of ADLs  - Assess for home care needs following discharge   - Consider OT consult to assist with ADL evaluation and planning for discharge  - Provide patient education as appropriate  Outcome: Progressing  Goal: Maintains/Returns to pre admission functional level  Description: INTERVENTIONS:  - Perform BMAT or MOVE assessment daily    - Set and communicate daily mobility goal to care team and patient/family/caregiver     - Collaborate with rehabilitation services on mobility goals if consulted  - Out of bed for toileting  - Record patient progress and toleration of activity level   Outcome: Progressing     Problem: RESPIRATORY - ADULT  Goal: Achieves optimal ventilation and oxygenation  Description: INTERVENTIONS:  - Assess for changes in respiratory status  - Assess for changes in mentation and behavior  - Position to facilitate oxygenation and minimize respiratory effort  - Oxygen administered by appropriate delivery if ordered  - Initiate smoking cessation education as indicated  - Encourage broncho-pulmonary hygiene including cough, deep breathe, Incentive Spirometry  - Assess the need for suctioning and aspirate as needed  - Assess and instruct to report SOB or any respiratory difficulty  - Respiratory Therapy support as indicated  Outcome: Progressing

## 2022-07-04 NOTE — PLAN OF CARE
Problem: Potential for Falls  Goal: Patient will remain free of falls  Description: INTERVENTIONS:  - Educate patient/family on patient safety including physical limitations  - Instruct patient to call for assistance with activity   - Consult OT/PT to assist with strengthening/mobility   - Keep Call bell within reach  - Keep bed low and locked with side rails adjusted as appropriate  - Keep care items and personal belongings within reach  - Initiate and maintain comfort rounds  - Make Fall Risk Sign visible to staff  - Offer Toileting every 2 Hours, in advance of need  - Initiate/Maintain bed alarm  - Obtain necessary fall risk management equipment:   - Apply yellow socks and bracelet for high fall risk patients  Outcome: Progressing     Problem: Prexisting or High Potential for Compromised Skin Integrity  Goal: Skin integrity is maintained or improved  Description: INTERVENTIONS:  - Identify patients at risk for skin breakdown  - Assess and monitor skin integrity  - Assess and monitor nutrition and hydration status  - Monitor labs   - Assess for incontinence   - Turn and reposition patient  - Assist with mobility/ambulation  - Relieve pressure over bony prominences  - Avoid friction and shearing  - Provide appropriate hygiene as needed including keeping skin clean and dry  - Evaluate need for skin moisturizer/barrier cream  - Collaborate with interdisciplinary team   - Patient/family teaching  Outcome: Progressing     Problem: RESPIRATORY - ADULT  Goal: Achieves optimal ventilation and oxygenation  Description: INTERVENTIONS:  - Assess for changes in respiratory status  - Assess for changes in mentation and behavior  - Position to facilitate oxygenation and minimize respiratory effort  - Oxygen administered by appropriate delivery if ordered  - Encourage broncho-pulmonary hygiene including cough, deep breathe, Incentive Spirometry  - Assess and instruct to report SOB or any respiratory difficulty  - Respiratory Therapy support as indicated  Outcome: Progressing

## 2022-07-04 NOTE — H&P
H&P Exam - Elvira Moeller 65 y o  male MRN: [id number]    Unit/Bed#: ED 03 Encounter: [id number]    Elvira Moeller is a 69yo male who is being admitted for mild COPD exacerbation  Pt is being admitted for observation under the care of Dr Nicole Sandoval  Anticipated length of stay is less than 2 midnights  Assessment & Plan:  COPD with acute exacerbation [facility])  Assessment & Plan  Patient with history of AAT deficiency and centrilobular emphysema presents with worsening sob over the last couple days with no relief of symptoms with use of nebulizer at home  He is on home oxygen baseline 2-3 L  ED: IV solumedrol 125mg x1  · Mild exacerbation in setting of worsening symptoms, expiratory wheezing but baseline O2 use  · Start Solu Medrol 40mg BID  · Continue home Pulmicort  · DuoNebs Q6H [address] and Q4H PRN   · D dimer elevated 1 37, CTA PE read pending  Per ED attending, virtal radiologist read chronic PE unchanged from prior  · Continues pulse ox  · Keep SpO2 above 92%    Alpha-1-antitrypsin deficiency [facility])  Assessment & Plan  Receives weekly Zemaira infusion  Will confirm last dose and order for weekly dose accordingly    Essential hypertension  Assessment & Plan  BP elevated with -190 on admission, missed evening dose of antihypertensives     · Received IV hydralazine 5mg x1  · Continue home norvasc 10mg daily and metoprolol 25mg BID  · Continue to monitor    CKD (chronic kidney disease) stage 2, GFR 60-89 ml/min  Assessment & Plan  Lab Results   Component Value Date    EGFR 48 07/03/2022    EGFR 63 06/09/2022    EGFR 61 06/08/2022    CREATININE 1 39 (H) 07/03/2022    CREATININE 1 11 06/09/2022    CREATININE 1 13 06/08/2022   BL Cr 1 1-1 2  Continue to monitor    BPH (benign prostatic hyperplasia)  Assessment & Plan  Continue home flomax 0 4mg daily and finasteride 5mg daily    Parkinson's disease (HCC)  Assessment & Plan  Continue sinemet 25-100mg TID    Anxiety  Assessment & Plan  Continue buspar 10mg TID    Depression, recurrent (HCC)  Assessment & Plan  Continue remeron 30mg QHS    Peripheral neuropathy  Assessment & Plan  Continue gabapentin 200mg BID    Global:  - Replete electrolytes as needed  - No IVF  - Level 3 Dysphagia Diet, per prior notes  - DVT PPX with lovenox 40mg QD and SCDs    History of Present Illness   Pt is a 69yo male with PMH of A1AT, HTN, BPH, anxiety, depression on palliative care who is presenting with a few days of SOB  Recently admitted for COPD exacerbation, followed by SNF stay  Per ED attending, pt received breathing tx by EMS, unsure which ones  Pt reports improved SOB since receiving solumedrol in ED  Denies chest pain, headaches, nausea, vomiting, diarrhea, visual changes  ED Course: IV solumedrol 15mg x1    Review of Systems   Constitutional: Negative for chills and fever  HENT: Negative for ear pain and sore throat  Eyes: Negative for pain and visual disturbance  Respiratory: Positive for shortness of breath  Negative for cough  Cardiovascular: Negative for chest pain and palpitations  Gastrointestinal: Negative for abdominal pain and vomiting  Genitourinary: Negative for dysuria and hematuria  Musculoskeletal: Negative for arthralgias and back pain  Skin: Negative for color change and rash  Neurological: Negative for seizures and syncope  All other systems reviewed and are negative        Historical Information   Past Medical History:   Diagnosis Date    Anesthesia complication     Difficult to wake up    Arthritis     BPH (benign prostatic hyperplasia)     urinary frequency    Cancer (HCC)     basal cell neck, face    COPD (chronic obstructive pulmonary disease) (HCC)     COPD exacerbation (HCC) 12/29/2019    Elevated PSA 10/25/2018    Full dentures     Hiatal hernia     History of methicillin resistant staphylococcus aureus (MRSA)     10/11/2018 MRSA (nares) positive    Hypertension     Irritable bowel syndrome     Kidney stone     at least 7 episodes    Liver disease     Alpha 1- enzyme deficiency - diagnosed   has been on weekly replacement therapy since then    Pulmonary emphysema ([facility] [address] )     1/25/15  FEV1 - 2 45 liters or 59% of predicted    RSV infection 2017    Wears glasses     for driving only     Past Surgical History:   Procedure Laterality Date    BACK SURGERY      discectomy    CARDIAC CATHETERIZATION N/A 5/3/2022    Procedure: Cardiac catheterization both left and right heart catheterization with vaso dilatory trial;  Surgeon: Jacki Jay MD;  Location: [place] [address] CATH LAB; Service: Cardiology    CARDIAC CATHETERIZATION Left 5/3/2022    Procedure: Cardiac Left Heart Cath;  Surgeon: Jacki Jay MD;  Location: [place] [address] CATH LAB; Service: Cardiology    CARDIAC CATHETERIZATION Left 5/3/2022    Procedure: Cardiac Left Ventriculogram;  Surgeon: Jacki Jay MD;  Location: [place] [address] CATH LAB; Service: Cardiology    COLONOSCOPY      COLONOSCOPY N/A 3/10/2017    Procedure: Rk Pyo;  Surgeon: Farzad Tanner MD;  Location: [place] [address] GI LAB; Service:    Venda Flank      removal of kidney stones    ESOPHAGOGASTRODUODENOSCOPY N/A 3/10/2017    Procedure: ESOPHAGOGASTRODUODENOSCOPY (EGD); Surgeon: Farzad Tanner MD;  Location: [facility] GI LAB; Service:     LITHOTRIPSY      MS ESOPHAGOGASTRODUODENOSCOPY TRANSORAL DIAGNOSTIC N/A 2018    Procedure: ESOPHAGOGASTRODUODENOSCOPY (EGD); Surgeon: Farzad Tanner MD;  Location: [facility] GI LAB;   Service: Gastroenterology    TONSILLECTOMY      VEIN LIGATION AND STRIPPING Bilateral          Social History   Social History     Substance and Sexual Activity   Alcohol Use Never    Comment: stopped in      Social History     Substance and Sexual Activity   Drug Use Not Currently     Social History     Tobacco Use   Smoking Status Former Smoker    Packs/day: 1 00    Years: 60 00    Pack years: 60 00    Quit date: 2017    Years since quittin 8 Smokeless Tobacco Never Used   Tobacco Comment    quit in 2017     E-Cigarette Use: Never User     E-Cigarette/Vaping Substances    Nicotine No     THC No     CBD No     Flavoring No     Other No     Unknown No        Family History:   Family History   Problem Relation Age of Onset    Emphysema Mother         never smoked    Emphysema Father     Cancer Brother         colon    Colon cancer Brother     Ulcerative colitis Family     Liver disease Family        Meds/Allergies   PTA meds:   Prior to Admission Medications   Prescriptions Last Dose Informant Patient Reported? Taking? OXYGEN-HELIUM IN  Self Yes Yes   Sig: Inhale 2L at rest- 3L with activity   Ventolin  (90 Base) MCG/ACT inhaler  Self No Yes   Sig: Inhale 2 puffs every 4 (four) hours as needed for wheezing   ZEMAIRA infusion  Self Yes Yes   Sig: once a week    amLODIPine (NORVASC) 10 mg tablet  Self No Yes   Sig: TAKE ONE TABLET BY MOUTH DAILY    budesonide (PULMICORT) 0 5 mg/2 mL nebulizer solution   No Yes   Sig: Take 2 mL (0 5 mg total) by nebulization in the morning and 2 mL (0 5 mg total) in the evening  busPIRone (BUSPAR) 10 mg tablet   No Yes   Sig: TAKE ONE TABLET BY MOUTH THREE TIMES DAILY   carbidopa-levodopa (SINEMET)  mg per tablet   No Yes   Sig: Take 1 tablet by mouth 3 (three) times a day   cholestyramine (QUESTRAN) 4 g packet   No Yes   Sig: Take 1 packet (4 g total) by mouth in the morning     finasteride (PROSCAR) 5 mg tablet  Self No Yes   Sig: TAKE ONE TABLET BY MOUTH IN THE MORNING    fluticasone (FLONASE) 50 mcg/act nasal spray  Self No Yes   Si sprays into each nostril daily   gabapentin (NEURONTIN) 100 mg capsule   No Yes   Sig: Take 2 capsules (200 mg total) by mouth 2 (two) times a day   ipratropium-albuterol (DUO-NEB) 0 5-2 5 mg/3 mL nebulizer solution  Self No Yes   Sig: Take 3 mL by nebulization 4 (four) times a day   metoprolol tartrate (LOPRESSOR) 25 mg tablet   No Yes   Sig: Take 1 tablet (25 mg total) by mouth every 12 (twelve) hours   midodrine (PROAMATINE) 10 MG tablet   No Yes   Sig: Take 1 tab three times daily AS NEEDED if SBP less than 100  Please hold medication if blood pressure is above 634 systolic    mirtazapine (REMERON) 15 mg tablet   No Yes   Sig: Take 2 tablets (30 mg total) by mouth daily at bedtime   pantoprazole (PROTONIX) 40 mg tablet   No Yes   Sig: TAKE ONE TABLET BY MOUTH TWICE DAILY   tamsulosin (FLOMAX) 0 4 mg   No Yes   Sig: TAKE ONE CAPSULE BY MOUTH ONE TIME DAILY      Facility-Administered Medications: None     Allergies   Allergen Reactions    Chantix [Varenicline]      Caused prostate infection       Objective   First Vitals:   Blood Pressure: (!) 183/102 (07/03/22 2028)  Pulse: 104 (07/03/22 2028)  Temperature: 97 7 °F (36 5 °C) (07/03/22 2028)  Temp Source: Tympanic (07/03/22 2028)  Respirations: (!) 24 (07/03/22 2029)  SpO2: 96 % (07/03/22 2028)    Current Vitals:   Blood Pressure: (!) 181/98 (07/04/22 0313)  Pulse: 80 (07/04/22 0313)  Temperature: 97 7 °F (36 5 °C) (07/03/22 2028)  Temp Source: Tympanic (07/03/22 2028)  Respirations: 22 (07/04/22 0313)  SpO2: 96 % (07/04/22 0313)    No intake or output data in the 24 hours ending 07/04/22 0330    Invasive Devices  Report    Peripheral Intravenous Line  Duration           Peripheral IV 07/03/22 Left Arm <1 day                Physical Exam  Vitals and nursing note reviewed  Constitutional:       Appearance: He is well-developed  HENT:      Head: Normocephalic and atraumatic  Eyes:      Conjunctiva/sclera: Conjunctivae normal    Cardiovascular:      Rate and Rhythm: Normal rate and regular rhythm  Heart sounds: No murmur heard  Pulmonary:      Effort: Respiratory distress (mild) present  Breath sounds: Wheezing (diffuse, expiratory) present  Abdominal:      Palpations: Abdomen is soft  Tenderness: There is no abdominal tenderness  Musculoskeletal:      Cervical back: Neck supple  Right lower leg: No edema  Left lower leg: No edema  Skin:     General: Skin is warm and dry  Capillary Refill: Capillary refill takes less than 2 seconds  Neurological:      Mental Status: He is alert     Psychiatric:         Mood and Affect: Mood normal          Behavior: Behavior normal          Lab Results:   Results for orders placed or performed during the hospital encounter of 07/03/22   CBC and differential   Result Value Ref Range    WBC 7 56 4 31 - 10 16 Thousand/uL    RBC 4 20 3 88 - 5 62 Million/uL    Hemoglobin 13 7 12 0 - 17 0 g/dL    Hematocrit 39 9 36 5 - 49 3 %    MCV 95 82 - 98 fL    MCH 32 6 26 8 - 34 3 pg    MCHC 34 3 31 4 - 37 4 g/dL    RDW 16 1 (H) 11 6 - 15 1 %    MPV 9 3 8 9 - 12 7 fL    Platelets 088 346 - 050 Thousands/uL    nRBC 0 /100 WBCs    Neutrophils Relative 43 43 - 75 %    Immat GRANS % 1 0 - 2 %    Lymphocytes Relative 43 14 - 44 %    Monocytes Relative 7 4 - 12 %    Eosinophils Relative 5 0 - 6 %    Basophils Relative 1 0 - 1 %    Neutrophils Absolute 3 24 1 85 - 7 62 Thousands/µL    Immature Grans Absolute 0 04 0 00 - 0 20 Thousand/uL    Lymphocytes Absolute 3 33 0 60 - 4 47 Thousands/µL    Monocytes Absolute 0 50 0 17 - 1 22 Thousand/µL    Eosinophils Absolute 0 39 0 00 - 0 61 Thousand/µL    Basophils Absolute 0 06 0 00 - 0 10 Thousands/µL   Comprehensive metabolic panel   Result Value Ref Range    Sodium 143 136 - 145 mmol/L    Potassium 4 1 3 5 - 5 3 mmol/L    Chloride 106 100 - 108 mmol/L    CO2 25 21 - 32 mmol/L    ANION GAP 12 4 - 13 mmol/L    BUN 15 5 - 25 mg/dL    Creatinine 1 39 (H) 0 60 - 1 30 mg/dL    Glucose 120 65 - 140 mg/dL    Calcium 9 0 8 3 - 10 1 mg/dL    AST 27 5 - 45 U/L    ALT 24 12 - 78 U/L    Alkaline Phosphatase 76 46 - 116 U/L    Total Protein 7 0 6 4 - 8 2 g/dL    Albumin 3 9 3 5 - 5 0 g/dL    Total Bilirubin 1 08 (H) 0 20 - 1 00 mg/dL    eGFR 48 ml/min/1 73sq m   D-Dimer   Result Value Ref Range    D-Dimer, Quant 1 37 (H) <0 50 ug/ml FEU HS Troponin 0hr (reflex protocol)   Result Value Ref Range    hs TnI 0hr 9 "Refer to ACS Flowchart"- see link ng/L   NT-BNP PRO   Result Value Ref Range    NT-proBNP 168 <450 pg/mL   HS Troponin I 2hr   Result Value Ref Range    hs TnI 2hr 9 "Refer to ACS Flowchart"- see link ng/L    Delta 2hr hsTnI 0 <20 ng/L   HS Troponin I 4hr   Result Value Ref Range    hs TnI 4hr 8 "Refer to ACS Flowchart"- see link ng/L    Delta 4hr hsTnI -1 <20 ng/L     *Note: Due to a large number of results and/or encounters for the requested time period, some results have not been displayed  A complete set of results can be found in Results Review       Imaging:   XR chest 1 view portable    (Results Pending)   CTA ED chest PE study    (Results Pending)     Code Status: Prior

## 2022-07-04 NOTE — ED PROVIDER NOTES
History  Chief Complaint   Patient presents with    Shortness of Breath     Brought by EMS due to SOB, was just in the hospital for the same reason then went to rehab and went home then felt weak and short of breath and getting worst   Patient of 3L at home and is having expiratory wheeze     66 yo male brought in by ems for sob  Pt has chronic copd and has been in the hospital recently then was placed in a rehab and has been for for a couple days now  Pt states he has wheezing and sob  No fevers or chills no other complaints  No other complaints   Pt states his oxygen saturation hardly ever drops but he has had sob most of the time  On home oxygen          Prior to Admission Medications   Prescriptions Last Dose Informant Patient Reported? Taking? OXYGEN-HELIUM IN  Self Yes Yes   Sig: Inhale 2L at rest- 3L with activity   Ventolin  (90 Base) MCG/ACT inhaler  Self No Yes   Sig: Inhale 2 puffs every 4 (four) hours as needed for wheezing   ZEMAIRA infusion  Self Yes Yes   Sig: once a week    amLODIPine (NORVASC) 10 mg tablet  Self No Yes   Sig: TAKE ONE TABLET BY MOUTH DAILY    budesonide (PULMICORT) 0 5 mg/2 mL nebulizer solution   No Yes   Sig: Take 2 mL (0 5 mg total) by nebulization in the morning and 2 mL (0 5 mg total) in the evening  busPIRone (BUSPAR) 10 mg tablet   No Yes   Sig: TAKE ONE TABLET BY MOUTH THREE TIMES DAILY   carbidopa-levodopa (SINEMET)  mg per tablet   No Yes   Sig: Take 1 tablet by mouth 3 (three) times a day   cholestyramine (QUESTRAN) 4 g packet   No Yes   Sig: Take 1 packet (4 g total) by mouth in the morning     finasteride (PROSCAR) 5 mg tablet  Self No Yes   Sig: TAKE ONE TABLET BY MOUTH IN THE MORNING    fluticasone (FLONASE) 50 mcg/act nasal spray  Self No Yes   Si sprays into each nostril daily   gabapentin (NEURONTIN) 100 mg capsule   No Yes   Sig: Take 2 capsules (200 mg total) by mouth 2 (two) times a day   ipratropium-albuterol (DUO-NEB) 0 5-2 5 mg/3 mL nebulizer solution  Self No Yes   Sig: Take 3 mL by nebulization 4 (four) times a day   metoprolol tartrate (LOPRESSOR) 25 mg tablet   No Yes   Sig: Take 1 tablet (25 mg total) by mouth every 12 (twelve) hours   midodrine (PROAMATINE) 10 MG tablet   No Yes   Sig: Take 1 tab three times daily AS NEEDED if SBP less than 100  Please hold medication if blood pressure is above 230 systolic    mirtazapine (REMERON) 15 mg tablet   No Yes   Sig: Take 2 tablets (30 mg total) by mouth daily at bedtime   pantoprazole (PROTONIX) 40 mg tablet   No Yes   Sig: TAKE ONE TABLET BY MOUTH TWICE DAILY   tamsulosin (FLOMAX) 0 4 mg   No Yes   Sig: TAKE ONE CAPSULE BY MOUTH ONE TIME DAILY      Facility-Administered Medications: None       Past Medical History:   Diagnosis Date    Anesthesia complication     Difficult to wake up    Arthritis     BPH (benign prostatic hyperplasia)     urinary frequency    Cancer (HCC)     basal cell neck, face    COPD (chronic obstructive pulmonary disease) (HCC)     COPD exacerbation (HCC) 12/29/2019    Elevated PSA 10/25/2018    Full dentures     Hiatal hernia     History of methicillin resistant staphylococcus aureus (MRSA)     10/11/2018 MRSA (nares) positive    Hypertension     Irritable bowel syndrome     Kidney stone     at least 7 episodes    Liver disease     Alpha 1- enzyme deficiency - diagnosed 2002  has been on weekly replacement therapy since then    Pulmonary emphysema ([address] )     1/25/15  FEV1 - 2 45 liters or 59% of predicted    RSV infection 12/2017    Wears glasses     for driving only       Past Surgical History:   Procedure Laterality Date    BACK SURGERY  2008    discectomy    CARDIAC CATHETERIZATION N/A 5/3/2022    Procedure: Cardiac catheterization both left and right heart catheterization with vaso dilatory trial;  Surgeon: Bryan Duffy MD;  Location: [place] [address] CATH LAB;   Service: Cardiology    CARDIAC CATHETERIZATION Left 5/3/2022    Procedure: Cardiac Left Heart Cath;  Surgeon: Von Judd MD;  Location: [place] [address] CATH LAB; Service: Cardiology    CARDIAC CATHETERIZATION Left 5/3/2022    Procedure: Cardiac Left Ventriculogram;  Surgeon: Von Judd MD;  Location: [place] [address] CATH LAB; Service: Cardiology    COLONOSCOPY      COLONOSCOPY N/A 3/10/2017    Procedure: Yfn Grace;  Surgeon: Andria Abraham MD;  Location: [place] [address] GI LAB; Service:    Lucia Peace      removal of kidney stones    ESOPHAGOGASTRODUODENOSCOPY N/A 3/10/2017    Procedure: ESOPHAGOGASTRODUODENOSCOPY (EGD); Surgeon: Andria Abraham MD;  Location: [facility] GI LAB; Service:     LITHOTRIPSY      GA ESOPHAGOGASTRODUODENOSCOPY TRANSORAL DIAGNOSTIC N/A 2018    Procedure: ESOPHAGOGASTRODUODENOSCOPY (EGD); Surgeon: Andria Abraham MD;  Location: [facility] GI LAB; Service: Gastroenterology    TONSILLECTOMY      VEIN LIGATION AND STRIPPING Bilateral     18's       Family History   Problem Relation Age of Onset    Emphysema Mother         never smoked    Emphysema Father     Cancer Brother         colon    Colon cancer Brother     Ulcerative colitis Family     Liver disease Family      I have reviewed and agree with the history as documented  E-Cigarette/Vaping    E-Cigarette Use Never User      E-Cigarette/Vaping Substances    Nicotine No     THC No     CBD No     Flavoring No     Other No     Unknown No      Social History     Tobacco Use    Smoking status: Former Smoker     Packs/day: 1 00     Years: 60 00     Pack years: 60 00     Quit date: 2017     Years since quittin 8    Smokeless tobacco: Never Used    Tobacco comment: quit in 2017   Vaping Use    Vaping Use: Never used   Substance Use Topics    Alcohol use: Never     Comment: stopped in     Drug use: Not Currently       Review of Systems   Constitutional: Negative for activity change, chills, diaphoresis and fever     HENT: Negative for congestion, ear pain, nosebleeds, sore throat, trouble swallowing and voice change  Eyes: Negative for pain, discharge and redness  Respiratory: Positive for shortness of breath and wheezing  Negative for apnea, cough, choking and stridor  Cardiovascular: Negative for chest pain and palpitations  Gastrointestinal: Negative for abdominal distention, abdominal pain, constipation, diarrhea, nausea and vomiting  Endocrine: Negative for polydipsia  Genitourinary: Negative for difficulty urinating, dysuria, flank pain, frequency, hematuria and urgency  Musculoskeletal: Negative for back pain, gait problem, joint swelling, myalgias, neck pain and neck stiffness  Skin: Negative for pallor and rash  Neurological: Negative for dizziness, tremors, syncope, speech difficulty, weakness, numbness and headaches  Hematological: Negative for adenopathy  Psychiatric/Behavioral: Negative for confusion, hallucinations, self-injury and suicidal ideas  The patient is not nervous/anxious  Physical Exam  Physical Exam  Vitals and nursing note reviewed  Constitutional:       General: He is not in acute distress  Appearance: He is well-developed  He is not diaphoretic  HENT:      Head: Normocephalic and atraumatic  Right Ear: External ear normal       Left Ear: External ear normal       Nose: Nose normal    Eyes:      Conjunctiva/sclera: Conjunctivae normal       Pupils: Pupils are equal, round, and reactive to light  Cardiovascular:      Rate and Rhythm: Normal rate and regular rhythm  Heart sounds: Normal heart sounds  Pulmonary:      Effort: Pulmonary effort is normal       Breath sounds: Examination of the right-upper field reveals wheezing  Examination of the left-upper field reveals wheezing  Examination of the right-middle field reveals wheezing  Examination of the left-middle field reveals wheezing  Examination of the right-lower field reveals wheezing  Examination of the left-lower field reveals wheezing  Wheezing present  Abdominal:      General: Bowel sounds are normal       Palpations: Abdomen is soft  Musculoskeletal:         General: Normal range of motion  Cervical back: Normal range of motion and neck supple  Skin:     General: Skin is warm and dry  Neurological:      Mental Status: He is alert and oriented to person, place, and time  Deep Tendon Reflexes: Reflexes are normal and symmetric           Vital Signs  ED Triage Vitals   Temperature Pulse Respirations Blood Pressure SpO2   07/03/22 2028 07/03/22 2028 07/03/22 2029 07/03/22 2028 07/03/22 2028   97 7 °F (36 5 °C) 104 (!) 24 (!) 183/102 96 %      Temp Source Heart Rate Source Patient Position - Orthostatic VS BP Location FiO2 (%)   07/03/22 2028 07/03/22 2028 07/03/22 2028 07/03/22 2028 --   Tympanic Monitor Sitting Right arm       Pain Score       07/04/22 0403       No Pain           Vitals:    07/04/22 0555 07/04/22 0641 07/04/22 0719 07/04/22 1437   BP:  156/78 141/75 158/72   Pulse: 97 88 94 78   Patient Position - Orthostatic VS:  Lying Lying Lying         Visual Acuity      ED Medications  Medications   amLODIPine (NORVASC) tablet 10 mg (10 mg Oral Given 7/4/22 0903)   budesonide (PULMICORT) inhalation solution 0 5 mg (0 5 mg Nebulization Given 7/4/22 1155)   busPIRone (BUSPAR) tablet 10 mg (10 mg Oral Given 7/4/22 0903)   carbidopa-levodopa (SINEMET)  mg per tablet 1 tablet (1 tablet Oral Given 7/4/22 0902)   finasteride (PROSCAR) tablet 5 mg (5 mg Oral Given 7/4/22 0903)   fluticasone (FLONASE) 50 mcg/act nasal spray 2 spray (2 sprays Nasal Given 7/4/22 0903)   gabapentin (NEURONTIN) capsule 200 mg (200 mg Oral Given 7/4/22 0902)   ipratropium-albuterol (DUO-NEB) 0 5-2 5 mg/3 mL inhalation solution 3 mL (3 mL Nebulization Not Given 7/4/22 1404)   metoprolol tartrate (LOPRESSOR) tablet 25 mg (25 mg Oral Given 7/4/22 0902)   mirtazapine (REMERON) tablet 30 mg (has no administration in time range)   pantoprazole (PROTONIX) EC tablet 40 mg (40 mg Oral Given 7/4/22 0902)   tamsulosin (FLOMAX) capsule 0 4 mg (0 4 mg Oral Given 7/4/22 0903)   ipratropium-albuterol (DUO-NEB) 0 5-2 5 mg/3 mL inhalation solution 3 mL (3 mL Nebulization Given 7/4/22 1155)   enoxaparin (LOVENOX) subcutaneous injection 40 mg (40 mg Subcutaneous Given 7/4/22 0903)   methylPREDNISolone sodium succinate (Solu-MEDROL) injection 40 mg (40 mg Intravenous Given 7/4/22 0903)   ipratropium-albuterol (DUO-NEB) 0 5-2 5 mg/3 mL inhalation solution 3 mL (has no administration in time range)   cholestyramine sugar free (QUESTRAN LIGHT) packet 4 g (4 g Oral Given 7/4/22 0909)   formoterol (PERFOROMIST) nebulizer solution 20 mcg (20 mcg Nebulization Not Given 7/4/22 1157)   methylPREDNISolone sodium succinate (Solu-MEDROL) injection 125 mg (125 mg Intravenous Given 7/3/22 2046)   iohexol (OMNIPAQUE) 300 mg/mL injection 50 mL (70 mL Intravenous Given 7/4/22 0035)   hydrALAZINE (APRESOLINE) injection 5 mg (5 mg Intravenous Given 7/4/22 0316)   polyethylene glycol (MIRALAX) packet 17 g (17 g Oral Given 7/4/22 0909)       Diagnostic Studies  Results Reviewed     Procedure Component Value Units Date/Time    Magnesium [[id number]]  (Abnormal) Collected: 07/03/22 2046    Lab Status: Final result Specimen: Blood from Arm, Right Updated: 07/04/22 0426     Magnesium 1 5 mg/dL     Phosphorus [[id number]]  (Normal) Collected: 07/03/22 2046    Lab Status: Final result Specimen: Blood from Arm, Right Updated: 07/04/22 0426     Phosphorus 3 1 mg/dL     HS Troponin I 4hr [[id number]]  (Normal) Collected: 07/04/22 0103    Lab Status: Final result Specimen: Blood from Arm, Left Updated: 07/04/22 0131     hs TnI 4hr 8 ng/L      Delta 4hr hsTnI -1 ng/L     HS Troponin I 2hr [[id number]]  (Normal) Collected: 07/03/22 2242    Lab Status: Final result Specimen: Blood from Arm, Left Updated: 07/03/22 2311     hs TnI 2hr 9 ng/L      Delta 2hr hsTnI 0 ng/L     HS Troponin 0hr (reflex protocol) [[id number]]  (Normal) Collected: 07/03/22 2046    Lab Status: Final result Specimen: Blood from Arm, Right Updated: 07/03/22 2124     hs TnI 0hr 9 ng/L     NT-BNP PRO [[id number]]  (Normal) Collected: 07/03/22 2046    Lab Status: Final result Specimen: Blood from Arm, Right Updated: 07/03/22 2123     NT-proBNP 168 pg/mL     Comprehensive metabolic panel [[id number]]  (Abnormal) Collected: 07/03/22 2046    Lab Status: Final result Specimen: Blood from Arm, Right Updated: 07/03/22 2123     Sodium 143 mmol/L      Potassium 4 1 mmol/L      Chloride 106 mmol/L      CO2 25 mmol/L      ANION GAP 12 mmol/L      BUN 15 mg/dL      Creatinine 1 39 mg/dL      Glucose 120 mg/dL      Calcium 9 0 mg/dL      AST 27 U/L      ALT 24 U/L      Alkaline Phosphatase 76 U/L      Total Protein 7 0 g/dL      Albumin 3 9 g/dL      Total Bilirubin 1 08 mg/dL      eGFR 48 ml/min/1 73sq m     Narrative:      [facility] guidelines for Chronic Kidney Disease (CKD):     Stage 1 with normal or high GFR (GFR > 90 mL/min/1 73 square meters)    Stage 2 Mild CKD (GFR = 60-89 mL/min/1 73 square meters)    Stage 3A Moderate CKD (GFR = 45-59 mL/min/1 73 square meters)    Stage 3B Moderate CKD (GFR = 30-44 mL/min/1 73 square meters)    Stage 4 Severe CKD (GFR = 15-29 mL/min/1 73 square meters)    Stage 5 End Stage CKD (GFR <15 mL/min/1 73 square meters)  Note: GFR calculation is accurate only with a steady state creatinine    D-Dimer [[id number]]  (Abnormal) Collected: 07/03/22 2046    Lab Status: Final result Specimen: Blood from Arm, Right Updated: 07/03/22 2113     D-Dimer, Quant 1 37 ug/ml FEU     Narrative: In the evaluation for possible pulmonary embolism, in the appropriate (Well's Score of 4 or less) patient, the age adjusted d-dimer cutoff for this patient can be calculated as:    Age x 0 01 (in ug/mL) for Age-adjusted D-dimer exclusion threshold for a patient over 50 years      CBC and differential [[id number]]  (Abnormal) Collected: 07/03/22 2046 Lab Status: Final result Specimen: Blood from Arm, Right Updated: 07/03/22 2100     WBC 7 56 Thousand/uL      RBC 4 20 Million/uL      Hemoglobin 13 7 g/dL      Hematocrit 39 9 %      MCV 95 fL      MCH 32 6 pg      MCHC 34 3 g/dL      RDW 16 1 %      MPV 9 3 fL      Platelets 151 Thousands/uL      nRBC 0 /100 WBCs      Neutrophils Relative 43 %      Immat GRANS % 1 %      Lymphocytes Relative 43 %      Monocytes Relative 7 %      Eosinophils Relative 5 %      Basophils Relative 1 %      Neutrophils Absolute 3 24 Thousands/µL      Immature Grans Absolute 0 04 Thousand/uL      Lymphocytes Absolute 3 33 Thousands/µL      Monocytes Absolute 0 50 Thousand/µL      Eosinophils Absolute 0 39 Thousand/µL      Basophils Absolute 0 06 Thousands/µL                  CTA ED chest PE study   Final Result by Jasmeet Vaca MD (07/04 0825)      No evidence of acute pulmonary embolism  Chronic-appearing mural-based thrombus within the posterior aspect of the distal left main pulmonary artery and extending into the posterior aspect left lower lobe pulmonary artery appears unchanged compared with    April 28, 2022  COPD with moderate to advanced emphysema, unchanged  No evidence of focal consolidation  No pneumothorax  Atherosclerosis  Coronary artery disease  Workstation performed: [id number]         XR chest 1 view portable   Final Result by Flo Edmond MD (07/04 1125)      Emphysema with no acute disease  Workstation performed: [id number]                    Procedures  Procedures         ED Course                               SBIRT 20yo+    Flowsheet Row Most Recent Value   SBIRT (25 yo +)    In order to provide better care to our patients, we are screening all of our patients for alcohol and drug use  Would it be okay to ask you these screening questions? Yes Filed at: 07/03/2022 2049   Initial Alcohol Screen: US AUDIT-C     1   How often do you have a drink containing alcohol? 0 Filed at: 07/03/2022 2049   2  How many drinks containing alcohol do you have on a typical day you are drinking? 0 Filed at: 07/03/2022 2049   3a  Male UNDER 65: How often do you have five or more drinks on one occasion? 0 Filed at: 07/03/2022 2049   3b  FEMALE Any Age, or MALE 65+: How often do you have 4 or more drinks on one occassion? 0 Filed at: 07/03/2022 2049   Audit-C Score 0 Filed at: 07/03/2022 2049   DIANE: How many times in the past year have you    Used an illegal drug or used a prescription medication for non-medical reasons? Never Filed at: 07/03/2022 2049                    MDM    Disposition  Final diagnoses:   COPD with acute exacerbation ([facility] )     Time reflects when diagnosis was documented in both MDM as applicable and the Disposition within this note     Time User Action Codes Description Comment    7/4/2022  2:59 AM June Jeronimo Add [J44 1] COPD with acute exacerbation ([facility] )     7/4/2022  8:36 AM Allyn Garcia Add [E88 01] Alpha-1-antitrypsin deficiency [facility])       ED Disposition     ED Disposition   Admit    Condition   Stable    Date/Time   Mon Jul 4, 2022  2:59 AM    Comment   Case was discussed with medicine and the patient's admission status was agreed to be Admission Status: observation status to the service of Dr Emanuel Newer   Follow-up Information    None         Current Discharge Medication List      CONTINUE these medications which have NOT CHANGED    Details   amLODIPine (NORVASC) 10 mg tablet TAKE ONE TABLET BY MOUTH DAILY   Qty: 30 tablet, Refills: 6    Associated Diagnoses: Essential hypertension      budesonide (PULMICORT) 0 5 mg/2 mL nebulizer solution Take 2 mL (0 5 mg total) by nebulization in the morning and 2 mL (0 5 mg total) in the evening    Qty: 360 mL, Refills: 11    Associated Diagnoses: Centrilobular emphysema (HCC)      busPIRone (BUSPAR) 10 mg tablet TAKE ONE TABLET BY MOUTH THREE TIMES DAILY  Qty: 90 tablet, Refills: 0 Associated Diagnoses: Chronic respiratory failure with hypoxia (HCC)      carbidopa-levodopa (SINEMET)  mg per tablet Take 1 tablet by mouth 3 (three) times a day  Qty: 90 tablet, Refills: 0    Associated Diagnoses: Parkinson's disease (HCC)      cholestyramine (QUESTRAN) 4 g packet Take 1 packet (4 g total) by mouth in the morning  Qty: 60 each, Refills: 0    Associated Diagnoses: Centrilobular emphysema (HCC)      finasteride (PROSCAR) 5 mg tablet TAKE ONE TABLET BY MOUTH IN THE MORNING   Qty: 90 tablet, Refills: 3    Associated Diagnoses: Benign prostatic hyperplasia with urinary obstruction      fluticasone (FLONASE) 50 mcg/act nasal spray 2 sprays into each nostril daily  Qty: 16 g, Refills: 5    Associated Diagnoses: Rhinitis, unspecified type      gabapentin (NEURONTIN) 100 mg capsule Take 2 capsules (200 mg total) by mouth 2 (two) times a day  Qty: 120 capsule, Refills: 0    Associated Diagnoses: Parkinson's disease ([facility])      ipratropium-albuterol (DUO-NEB) 0 5-2 5 mg/3 mL nebulizer solution Take 3 mL by nebulization 4 (four) times a day  Qty: 360 mL, Refills: 5    Associated Diagnoses: COPD exacerbation ([facility])      metoprolol tartrate (LOPRESSOR) 25 mg tablet Take 1 tablet (25 mg total) by mouth every 12 (twelve) hours  Qty: 60 tablet, Refills: 0    Associated Diagnoses: Essential hypertension      midodrine (PROAMATINE) 10 MG tablet Take 1 tab three times daily AS NEEDED if SBP less than 100  Please hold medication if blood pressure is above 348 systolic    Qty: 90 tablet, Refills: 4    Associated Diagnoses: Orthostatic hypertension      mirtazapine (REMERON) 15 mg tablet Take 2 tablets (30 mg total) by mouth daily at bedtime  Qty: 30 tablet, Refills: 5    Associated Diagnoses: Primary insomnia      OXYGEN-HELIUM IN Inhale 2L at rest- 3L with activity      pantoprazole (PROTONIX) 40 mg tablet TAKE ONE TABLET BY MOUTH TWICE DAILY  Qty: 180 tablet, Refills: 0    Associated Diagnoses: Gastroesophageal reflux disease without esophagitis      tamsulosin (FLOMAX) 0 4 mg TAKE ONE CAPSULE BY MOUTH ONE TIME DAILY  Qty: 90 capsule, Refills: 1    Associated Diagnoses: Benign prostatic hyperplasia with urinary obstruction      Ventolin  (90 Base) MCG/ACT inhaler Inhale 2 puffs every 4 (four) hours as needed for wheezing  Qty: 18 g, Refills: 5    Associated Diagnoses: Centrilobular emphysema ([address] )      ZEMAIRA infusion once a week              No discharge procedures on file      PDMP Review       Value Time User    PDMP Reviewed  Yes 4/5/2022  9:56 AM Clara Faith MD          ED Provider  Electronically Signed by           Alexander Meraz DO  07/04/22 3970

## 2022-07-04 NOTE — CONSULTS
Consultation - Pulmonary Medicine   José Luis Cardozo 65 y o  male MRN: [id number]  Unit/Bed#: [address] [place] [address] Encounter: [id number]      Assessment:  Acute on chronic hypoxemic respiratory failure   Moderate to severe COPD with acute exacerbation      Plan:   Currently back to his baseline of 3 L of oxygen by nasal cannula  Continue with DuoNeb round-the-clock  Continue with budesonide via nebulizer twice daily  Will add Perforomist via nebulizer twice daily given multiple recent exacerbations and his moderate to severe COPD with FEV1 of 40% at least about 2 years ago  And given his poor inspiratory capacity  Will also continue with the Solu-Medrol 40 mg q 12 hours for today  Again reinforced the need for clearance measures and to use incentive spirometry understands and verbalizes  Reviewed CT of the chest given elevated D-dimer had a CT angiogram done, no evidence of any pulmonary embolism, lungs severely hyperinflated moderate advanced emphysematous changes and mild bronchiectasis in the right lower lobe  Consideration for pulmonary rehab discussed and he would benefit from a full PFT as an outpatient with lung volumes and DLCO to consider for any endobronchial valve placement evaluation for his COPD  Outpatient Pulmonary follow-up with Dr Vasquez Wooten   Thank you for the consultation will continue to follow    History of Present Illness   Physician Requesting Consult: Vargas Kline MD  Reason for Consult / Principal Problem:  COPD exacerbation  Hx and PE limited by:  None  HPI: José Luis Cardozo is a 65y o  year old male former smoker who quit about 5 years ago, with moderate to severe COPD with FEV1 of 40% on chronic home oxygen 3 L continuous he states he has been getting admitted into the hospital multiple times recently    The last hospital admission for COPD flare-up was just about a month ago at baseline he lives alone at home and he states for the past several months has not gone out of the house and not used his portable concentrator and he started to have more shortness of breath just walking from his bed to the bathroom and coming back for which he came back into the ER  He states he feels slightly better after coming in into the hospital   He did not runn any fevers no history of any sick contacts, no recent travel  complain of significant coughing spells along with the shortness of breath  Inpatient consult to Pulmonology  Consult performed by: Vannessa Dubon MD  Consult ordered by: Shira Morrell MD          Review of Systems   Constitutional: Positive for fatigue  HENT: Negative  Eyes: Negative  Respiratory: Positive for cough, chest tightness and shortness of breath  Cardiovascular: Negative  Gastrointestinal: Negative  Endocrine: Negative  Genitourinary: Negative  Musculoskeletal: Negative  Allergic/Immunologic: Negative  Neurological: Negative  Hematological: Negative  Psychiatric/Behavioral: Negative  Historical Information   Past Medical History:   Diagnosis Date    Anesthesia complication     Difficult to wake up    Arthritis     BPH (benign prostatic hyperplasia)     urinary frequency    Cancer (HCC)     basal cell neck, face    COPD (chronic obstructive pulmonary disease) (HCC)     COPD exacerbation (HCC) 12/29/2019    Elevated PSA 10/25/2018    Full dentures     Hiatal hernia     History of methicillin resistant staphylococcus aureus (MRSA)     10/11/2018 MRSA (nares) positive    Hypertension     Irritable bowel syndrome     Kidney stone     at least 7 episodes    Liver disease     Alpha 1- enzyme deficiency - diagnosed 2002   has been on weekly replacement therapy since then    Pulmonary emphysema ([address] )     1/25/15  FEV1 - 2 45 liters or 59% of predicted    RSV infection 12/2017    Wears glasses     for driving only     Past Surgical History:   Procedure Laterality Date    BACK SURGERY  2008    discectomy    CARDIAC CATHETERIZATION N/A 5/3/2022    Procedure: Cardiac catheterization both left and right heart catheterization with vaso dilatory trial;  Surgeon: Cathy Oliveira MD;  Location: [place] [address] CATH LAB; Service: Cardiology    CARDIAC CATHETERIZATION Left 5/3/2022    Procedure: Cardiac Left Heart Cath;  Surgeon: Cathy Oliveira MD;  Location: [place] [address] CATH LAB; Service: Cardiology    CARDIAC CATHETERIZATION Left 5/3/2022    Procedure: Cardiac Left Ventriculogram;  Surgeon: Cathy Oliveira MD;  Location: [place] [address] CATH LAB; Service: Cardiology    COLONOSCOPY      COLONOSCOPY N/A 3/10/2017    Procedure: Bibiana ;  Surgeon: Flores Lindsay MD;  Location: [facility] GI LAB; Service:    Roxana Fide      removal of kidney stones    ESOPHAGOGASTRODUODENOSCOPY N/A 3/10/2017    Procedure: ESOPHAGOGASTRODUODENOSCOPY (EGD); Surgeon: Flores Lindsay MD;  Location: [facility] GI LAB; Service:     LITHOTRIPSY      IN ESOPHAGOGASTRODUODENOSCOPY TRANSORAL DIAGNOSTIC N/A 2018    Procedure: ESOPHAGOGASTRODUODENOSCOPY (EGD); Surgeon: Flores Lindsay MD;  Location: [facility] GI LAB;   Service: Gastroenterology    TONSILLECTOMY      VEIN LIGATION AND STRIPPING Bilateral          Social History   Social History     Substance and Sexual Activity   Alcohol Use Never    Comment: stopped in      Social History     Substance and Sexual Activity   Drug Use Not Currently     E-Cigarette/Vaping    E-Cigarette Use Never User      E-Cigarette/Vaping Substances    Nicotine No     THC No     CBD No     Flavoring No     Other No     Unknown No      Social History     Tobacco Use   Smoking Status Former Smoker    Packs/day: 1 00    Years: 60 00    Pack years: 60 00    Quit date: 2017    Years since quittin 8   Smokeless Tobacco Never Used   Tobacco Comment    quit in 2017         Family History: non-contributory    Meds/Allergies   current meds:   Current Facility-Administered Medications   Medication Dose Route Frequency  amLODIPine (NORVASC) tablet 10 mg  10 mg Oral Daily    budesonide (PULMICORT) inhalation solution 0 5 mg  0 5 mg Nebulization BID    busPIRone (BUSPAR) tablet 10 mg  10 mg Oral TID    carbidopa-levodopa (SINEMET)  mg per tablet 1 tablet  1 tablet Oral TID    cholestyramine sugar free (QUESTRAN LIGHT) packet 4 g  4 g Oral Daily    enoxaparin (LOVENOX) subcutaneous injection 40 mg  40 mg Subcutaneous Daily    finasteride (PROSCAR) tablet 5 mg  5 mg Oral QAM    fluticasone (FLONASE) 50 mcg/act nasal spray 2 spray  2 spray Nasal Daily    gabapentin (NEURONTIN) capsule 200 mg  200 mg Oral BID    ipratropium-albuterol (DUO-NEB) 0 5-2 5 mg/3 mL inhalation solution 3 mL  3 mL Nebulization Q6H    ipratropium-albuterol (DUO-NEB) 0 5-2 5 mg/3 mL inhalation solution 3 mL  3 mL Nebulization Q4H PRN    ipratropium-albuterol (DUO-NEB) 0 5-2 5 mg/3 mL inhalation solution 3 mL  3 mL Nebulization Once    methylPREDNISolone sodium succinate (Solu-MEDROL) injection 40 mg  40 mg Intravenous Q12H CORINA    metoprolol tartrate (LOPRESSOR) tablet 25 mg  25 mg Oral Q12H CORINA    mirtazapine (REMERON) tablet 30 mg  30 mg Oral HS    pantoprazole (PROTONIX) EC tablet 40 mg  40 mg Oral BID    tamsulosin (FLOMAX) capsule 0 4 mg  0 4 mg Oral Daily       Allergies   Allergen Reactions    Chantix [Varenicline]      Caused prostate infection       Objective   Vitals: Blood pressure 141/75, pulse 94, temperature 98 °F (36 7 °C), temperature source Oral, resp  rate (!) 24, height 6' 5" (1 956 m), weight 83 3 kg (183 lb 11 2 oz), SpO2 96 %  ,Body mass index is 21 78 kg/m²  Intake/Output Summary (Last 24 hours) at 7/4/2022 1049  Last data filed at 7/4/2022 0901  Gross per 24 hour   Intake --   Output 850 ml   Net -850 ml     Invasive Devices  Report    Peripheral Intravenous Line  Duration           Peripheral IV 07/03/22 Left Arm <1 day                Physical Exam  Vitals and nursing note reviewed       Currently patient is sitting up on the chair in no acute respiratory distress  Currently on 3 L of oxygen by nasal cannula saturating around 92-93%  Eyes anicteric sclera, conjunctivae is clear  ENT nares is patent, no evidence of any discharge  Neck no evidence of any JVD no cervical lymph nodes  Lungs diminished breath sounds bilaterally no rhonchi  Heart first and second heart sounds are heard no murmur or gallop is heard  Extremities no pedal edema  Skin no evidence of any bruises  CNS awake alert and oriented times 3  Lab Results:   CBC:   Lab Results   Component Value Date    WBC 7 56 07/03/2022    HGB 13 7 07/03/2022    HCT 39 9 07/03/2022    MCV 95 07/03/2022     07/03/2022    MCH 32 6 07/03/2022    MCHC 34 3 07/03/2022    RDW 16 1 (H) 07/03/2022    MPV 9 3 07/03/2022    NRBC 0 07/03/2022     Imaging Studies: I have personally reviewed pertinent films in PACS  EKG, Pathology, and Other Studies: I have personally reviewed pertinent reports      VTE Prophylaxis: Sequential compression device Jessika García)     Code Status: Level 1 - Full Code  Advance Directive and Living Will:      Power of :    POLST:

## 2022-07-05 PROBLEM — N18.2 CKD (CHRONIC KIDNEY DISEASE) STAGE 2, GFR 60-89 ML/MIN: Status: RESOLVED | Noted: 2022-07-04 | Resolved: 2022-07-05

## 2022-07-05 LAB
ANION GAP SERPL CALCULATED.3IONS-SCNC: 8 MMOL/L (ref 4–13)
ATRIAL RATE: 95 BPM
BASOPHILS # BLD AUTO: 0.01 THOUSANDS/ΜL (ref 0–0.1)
BASOPHILS NFR BLD AUTO: 0 % (ref 0–1)
BUN SERPL-MCNC: 25 MG/DL (ref 5–25)
CALCIUM SERPL-MCNC: 8.6 MG/DL (ref 8.3–10.1)
CHLORIDE SERPL-SCNC: 106 MMOL/L (ref 100–108)
CO2 SERPL-SCNC: 27 MMOL/L (ref 21–32)
CREAT SERPL-MCNC: 1.19 MG/DL (ref 0.6–1.3)
EOSINOPHIL # BLD AUTO: 0 THOUSAND/ΜL (ref 0–0.61)
EOSINOPHIL NFR BLD AUTO: 0 % (ref 0–6)
ERYTHROCYTE [DISTWIDTH] IN BLOOD BY AUTOMATED COUNT: 16.4 % (ref 11.6–15.1)
GFR SERPL CREATININE-BSD FRML MDRD: 58 ML/MIN/1.73SQ M
GLUCOSE SERPL-MCNC: 141 MG/DL (ref 65–140)
HCT VFR BLD AUTO: 33 % (ref 36.5–49.3)
HGB BLD-MCNC: 11.1 G/DL (ref 12–17)
IMM GRANULOCYTES # BLD AUTO: 0.05 THOUSAND/UL (ref 0–0.2)
IMM GRANULOCYTES NFR BLD AUTO: 1 % (ref 0–2)
LYMPHOCYTES # BLD AUTO: 1.11 THOUSANDS/ΜL (ref 0.6–4.47)
LYMPHOCYTES NFR BLD AUTO: 16 % (ref 14–44)
MCH RBC QN AUTO: 32.4 PG (ref 26.8–34.3)
MCHC RBC AUTO-ENTMCNC: 33.6 G/DL (ref 31.4–37.4)
MCV RBC AUTO: 96 FL (ref 82–98)
MONOCYTES # BLD AUTO: 0.25 THOUSAND/ΜL (ref 0.17–1.22)
MONOCYTES NFR BLD AUTO: 4 % (ref 4–12)
NEUTROPHILS # BLD AUTO: 5.42 THOUSANDS/ΜL (ref 1.85–7.62)
NEUTS SEG NFR BLD AUTO: 79 % (ref 43–75)
NRBC BLD AUTO-RTO: 0 /100 WBCS
P AXIS: -3 DEGREES
PLATELET # BLD AUTO: 135 THOUSANDS/UL (ref 149–390)
PMV BLD AUTO: 9 FL (ref 8.9–12.7)
POTASSIUM SERPL-SCNC: 4.5 MMOL/L (ref 3.5–5.3)
PR INTERVAL: 148 MS
QRS AXIS: 53 DEGREES
QRSD INTERVAL: 92 MS
QT INTERVAL: 350 MS
QTC INTERVAL: 439 MS
RBC # BLD AUTO: 3.43 MILLION/UL (ref 3.88–5.62)
SODIUM SERPL-SCNC: 141 MMOL/L (ref 136–145)
T WAVE AXIS: 28 DEGREES
VENTRICULAR RATE: 95 BPM
WBC # BLD AUTO: 6.84 THOUSAND/UL (ref 4.31–10.16)

## 2022-07-05 PROCEDURE — 94640 AIRWAY INHALATION TREATMENT: CPT

## 2022-07-05 PROCEDURE — 80048 BASIC METABOLIC PNL TOTAL CA: CPT | Performed by: STUDENT IN AN ORGANIZED HEALTH CARE EDUCATION/TRAINING PROGRAM

## 2022-07-05 PROCEDURE — 93010 ELECTROCARDIOGRAM REPORT: CPT | Performed by: INTERNAL MEDICINE

## 2022-07-05 PROCEDURE — 99232 SBSQ HOSP IP/OBS MODERATE 35: CPT | Performed by: FAMILY MEDICINE

## 2022-07-05 PROCEDURE — 85025 COMPLETE CBC W/AUTO DIFF WBC: CPT | Performed by: STUDENT IN AN ORGANIZED HEALTH CARE EDUCATION/TRAINING PROGRAM

## 2022-07-05 PROCEDURE — 99232 SBSQ HOSP IP/OBS MODERATE 35: CPT | Performed by: INTERNAL MEDICINE

## 2022-07-05 PROCEDURE — 94760 N-INVAS EAR/PLS OXIMETRY 1: CPT

## 2022-07-05 RX ORDER — BUSPIRONE HYDROCHLORIDE 10 MG/1
TABLET ORAL
Qty: 90 TABLET | Refills: 0 | Status: SHIPPED | OUTPATIENT
Start: 2022-07-05

## 2022-07-05 RX ADMIN — METHYLPREDNISOLONE SODIUM SUCCINATE 40 MG: 40 INJECTION, POWDER, FOR SOLUTION INTRAMUSCULAR; INTRAVENOUS at 21:56

## 2022-07-05 RX ADMIN — PANTOPRAZOLE SODIUM 40 MG: 40 TABLET, DELAYED RELEASE ORAL at 17:28

## 2022-07-05 RX ADMIN — GABAPENTIN 200 MG: 100 CAPSULE ORAL at 17:28

## 2022-07-05 RX ADMIN — ENOXAPARIN SODIUM 40 MG: 40 INJECTION SUBCUTANEOUS at 09:29

## 2022-07-05 RX ADMIN — BUSPIRONE HYDROCHLORIDE 10 MG: 10 TABLET ORAL at 16:10

## 2022-07-05 RX ADMIN — PANTOPRAZOLE SODIUM 40 MG: 40 TABLET, DELAYED RELEASE ORAL at 09:29

## 2022-07-05 RX ADMIN — METOPROLOL TARTRATE 25 MG: 25 TABLET, FILM COATED ORAL at 21:56

## 2022-07-05 RX ADMIN — IPRATROPIUM BROMIDE AND ALBUTEROL SULFATE 3 ML: 2.5; .5 SOLUTION RESPIRATORY (INHALATION) at 20:08

## 2022-07-05 RX ADMIN — FORMOTEROL FUMARATE DIHYDRATE 20 MCG: 20 SOLUTION RESPIRATORY (INHALATION) at 20:21

## 2022-07-05 RX ADMIN — IPRATROPIUM BROMIDE AND ALBUTEROL SULFATE 3 ML: 2.5; .5 SOLUTION RESPIRATORY (INHALATION) at 07:23

## 2022-07-05 RX ADMIN — IPRATROPIUM BROMIDE AND ALBUTEROL SULFATE 3 ML: 2.5; .5 SOLUTION RESPIRATORY (INHALATION) at 02:18

## 2022-07-05 RX ADMIN — FORMOTEROL FUMARATE DIHYDRATE 20 MCG: 20 SOLUTION RESPIRATORY (INHALATION) at 07:22

## 2022-07-05 RX ADMIN — MIRTAZAPINE 30 MG: 15 TABLET, FILM COATED ORAL at 21:56

## 2022-07-05 RX ADMIN — BUDESONIDE 0.5 MG: 0.5 INHALANT ORAL at 07:22

## 2022-07-05 RX ADMIN — TAMSULOSIN HYDROCHLORIDE 0.4 MG: 0.4 CAPSULE ORAL at 09:29

## 2022-07-05 RX ADMIN — CARBIDOPA AND LEVODOPA 1 TABLET: 25; 100 TABLET ORAL at 21:57

## 2022-07-05 RX ADMIN — GABAPENTIN 200 MG: 100 CAPSULE ORAL at 09:29

## 2022-07-05 RX ADMIN — BUSPIRONE HYDROCHLORIDE 10 MG: 10 TABLET ORAL at 09:29

## 2022-07-05 RX ADMIN — METHYLPREDNISOLONE SODIUM SUCCINATE 40 MG: 40 INJECTION, POWDER, FOR SOLUTION INTRAMUSCULAR; INTRAVENOUS at 09:30

## 2022-07-05 RX ADMIN — METOPROLOL TARTRATE 25 MG: 25 TABLET, FILM COATED ORAL at 09:29

## 2022-07-05 RX ADMIN — IPRATROPIUM BROMIDE AND ALBUTEROL SULFATE 3 ML: 2.5; .5 SOLUTION RESPIRATORY (INHALATION) at 13:04

## 2022-07-05 RX ADMIN — BUSPIRONE HYDROCHLORIDE 10 MG: 10 TABLET ORAL at 21:56

## 2022-07-05 RX ADMIN — CARBIDOPA AND LEVODOPA 1 TABLET: 25; 100 TABLET ORAL at 09:29

## 2022-07-05 RX ADMIN — AMLODIPINE BESYLATE 10 MG: 10 TABLET ORAL at 09:29

## 2022-07-05 RX ADMIN — FINASTERIDE 5 MG: 5 TABLET, FILM COATED ORAL at 09:29

## 2022-07-05 RX ADMIN — CARBIDOPA AND LEVODOPA 1 TABLET: 25; 100 TABLET ORAL at 16:10

## 2022-07-05 RX ADMIN — CHOLESTYRAMINE 4 G: 4 POWDER, FOR SUSPENSION ORAL at 09:30

## 2022-07-05 RX ADMIN — BUDESONIDE 0.5 MG: 0.5 INHALANT ORAL at 20:08

## 2022-07-05 NOTE — TELEPHONE ENCOUNTER
Completed form has been faxed to the number listed below; and placed in "TO BE SCANNED [facility]"        [phone]

## 2022-07-05 NOTE — PLAN OF CARE
Problem: Potential for Falls  Goal: Patient will remain free of falls  Description: INTERVENTIONS:  - Educate patient/family on patient safety including physical limitations  - Instruct patient to call for assistance with activity   - Consult OT/PT to assist with strengthening/mobility   - Keep Call bell within reach  - Keep bed low and locked with side rails adjusted as appropriate  - Keep care items and personal belongings within reach  - Initiate and maintain comfort rounds  - Make Fall Risk Sign visible to staff  - Offer Toileting every  Hours, in advance of need  - Initiate/Maintain alarm  - Obtain necessary fall risk management equipment:   - Apply yellow socks and bracelet for high fall risk patients  - Consider moving patient to room near nurses station  Outcome: Progressing     Problem: Prexisting or High Potential for Compromised Skin Integrity  Goal: Skin integrity is maintained or improved  Description: INTERVENTIONS:  - Identify patients at risk for skin breakdown  - Assess and monitor skin integrity  - Assess and monitor nutrition and hydration status  - Monitor labs   - Assess for incontinence   - Turn and reposition patient  - Assist with mobility/ambulation  - Relieve pressure over bony prominences  - Avoid friction and shearing  - Provide appropriate hygiene as needed including keeping skin clean and dry  - Evaluate need for skin moisturizer/barrier cream  - Collaborate with interdisciplinary team   - Patient/family teaching  - Consider wound care consult   Outcome: Progressing     Problem: MOBILITY - ADULT  Goal: Maintain or return to baseline ADL function  Description: INTERVENTIONS:  -  Assess patient's ability to carry out ADLs; assess patient's baseline for ADL function and identify physical deficits which impact ability to perform ADLs (bathing, care of mouth/teeth, toileting, grooming, dressing, etc )  - Assess/evaluate cause of self-care deficits   - Assess range of motion  - Assess patient's mobility; develop plan if impaired  - Assess patient's need for assistive devices and provide as appropriate  - Encourage maximum independence but intervene and supervise when necessary  - Involve family in performance of ADLs  - Assess for home care needs following discharge   - Consider OT consult to assist with ADL evaluation and planning for discharge  - Provide patient education as appropriate  Outcome: Progressing  Goal: Maintains/Returns to pre admission functional level  Description: INTERVENTIONS:  - Perform BMAT or MOVE assessment daily    - Set and communicate daily mobility goal to care team and patient/family/caregiver  - Collaborate with rehabilitation services on mobility goals if consulted  - Perform Range of Motion  times a day  - Reposition patient every  hours    - Dangle patient  times a day  - Stand patient  times a day  - Ambulate patient  times a day  - Out of bed to chair  times a day   - Out of bed for meals  times a day  - Out of bed for toileting  - Record patient progress and toleration of activity level   Outcome: Progressing     Problem: RESPIRATORY - ADULT  Goal: Achieves optimal ventilation and oxygenation  Description: INTERVENTIONS:  - Assess for changes in respiratory status  - Assess for changes in mentation and behavior  - Position to facilitate oxygenation and minimize respiratory effort  - Oxygen administered by appropriate delivery if ordered  - Initiate smoking cessation education as indicated  - Encourage broncho-pulmonary hygiene including cough, deep breathe, Incentive Spirometry  - Assess the need for suctioning and aspirate as needed  - Assess and instruct to report SOB or any respiratory difficulty  - Respiratory Therapy support as indicated  Outcome: Progressing

## 2022-07-05 NOTE — PROGRESS NOTES
[facility] Practice Progress Note - Alison Henry 65 y o  male MRN: [id number]    Unit/Bed#: [address] [place] [address] Encounter: [id number]      Assessment/Plan:  * COPD with acute exacerbation [facility])  Assessment & Plan  Patient with history of AAT deficiency and centrilobular emphysema presents with worsening sob over the last couple days with no relief of symptoms with use of nebulizer at home  He is on home oxygen baseline 2-3 L  ED: IV solumedrol 125mg x1    · Pulmonology Consult - continued recommendations appreciated  · Continue DuoNebs  · Continue Budesonide nebulizer BID  · Added Perforomist nebulizer BID  · Continue Solu-Medrol 40 mg BID  · Incentive spirometry  · Outpatient pulm follow up with PFTs & DLCO  · No need for anticoagulation for old unchanged PE in CTA  · Continue pulse ox  · Keep SpO2 above 92%    Parkinson's disease (HCC)  Assessment & Plan  Continue sinemet 25-100mg TID    Anxiety  Assessment & Plan  Continue buspar 10mg TID    Depression, recurrent (HCC)  Assessment & Plan  Continue remeron 30mg QHS    Peripheral neuropathy  Assessment & Plan  Continue gabapentin 200mg BID    BPH (benign prostatic hyperplasia)  Assessment & Plan  Continue home flomax 0 4mg daily and finasteride 5mg daily    Essential hypertension  Assessment & Plan  BP elevated with -190 on admission, missed evening dose of antihypertensives  · Received IV hydralazine 5mg x1  · Continue home norvasc 10mg daily and metoprolol 25mg BID  · Continue to monitor    Alpha-1-antitrypsin deficiency [facility])  Assessment & Plan  Receives weekly Zemaira infusion    Will confirm last dose and order for weekly dose accordingly    CKD (chronic kidney disease) stage 2, GFR 60-89 ml/min-resolved as of 7/5/2022  Assessment & Plan  Lab Results   Component Value Date    EGFR 58 07/05/2022    EGFR 48 07/03/2022    EGFR 63 06/09/2022    CREATININE 1 19 07/05/2022    CREATININE 1 39 (H) 07/03/2022    CREATININE 1 11 06/09/2022   BL Cr 1  1-1 2  Continue to monitor          Subjective:   Patient resting comfortably in bed on 3L of oxygen which is what he is normally on at home  He does report increased shortness of breath with exertion  He also reports some intermittent dizziness that has been ongoing for a few days and is most notable when he transitions to a sitting or standing position  He is concerned that he is not urinating as much as he normally does, and reports having a weak stream     Objective:     Vitals: Blood pressure 143/64, pulse 76, temperature 97 8 °F (36 6 °C), temperature source Oral, resp  rate 20, height 6' 5" (1 956 m), weight 83 kg (183 lb), SpO2 98 %  ,Body mass index is 21 7 kg/m²  Wt Readings from Last 3 Encounters:   07/05/22 83 kg (183 lb)   06/22/22 83 2 kg (183 lb 8 oz)   06/20/22 83 2 kg (183 lb 8 oz)       Intake/Output Summary (Last 24 hours) at 7/5/2022 1223  Last data filed at 7/5/2022 0700  Gross per 24 hour   Intake --   Output 800 ml   Net -800 ml       Physical Exam: /64 (BP Location: Right arm)   Pulse 76   Temp 97 8 °F (36 6 °C) (Oral)   Resp 20   Ht 6' 5" (1 956 m)   Wt 83 kg (183 lb)   SpO2 98%   BMI 21 70 kg/m²     Physical Exam  Constitutional:       General: He is not in acute distress  HENT:      Head: Normocephalic  Mouth/Throat:      Mouth: Mucous membranes are moist    Cardiovascular:      Rate and Rhythm: Normal rate and regular rhythm  Heart sounds: Normal heart sounds  Pulmonary:      Breath sounds: Wheezing present  Abdominal:      Tenderness: There is no abdominal tenderness  Musculoskeletal:      Right lower leg: No edema  Left lower leg: No edema  Skin:     Capillary Refill: Capillary refill takes less than 2 seconds  Neurological:      Mental Status: He is alert and oriented to person, place, and time     Psychiatric:         Mood and Affect: Mood normal          Behavior: Behavior normal  Recent Results (from the past 24 hour(s))   Basic metabolic panel    Collection Time: 07/05/22  4:59 AM   Result Value Ref Range    Sodium 141 136 - 145 mmol/L    Potassium 4 5 3 5 - 5 3 mmol/L    Chloride 106 100 - 108 mmol/L    CO2 27 21 - 32 mmol/L    ANION GAP 8 4 - 13 mmol/L    BUN 25 5 - 25 mg/dL    Creatinine 1 19 0 60 - 1 30 mg/dL    Glucose 141 (H) 65 - 140 mg/dL    Calcium 8 6 8 3 - 10 1 mg/dL    eGFR 58 ml/min/1 73sq m   CBC and differential    Collection Time: 07/05/22  4:59 AM   Result Value Ref Range    WBC 6 84 4 31 - 10 16 Thousand/uL    RBC 3 43 (L) 3 88 - 5 62 Million/uL    Hemoglobin 11 1 (L) 12 0 - 17 0 g/dL    Hematocrit 33 0 (L) 36 5 - 49 3 %    MCV 96 82 - 98 fL    MCH 32 4 26 8 - 34 3 pg    MCHC 33 6 31 4 - 37 4 g/dL    RDW 16 4 (H) 11 6 - 15 1 %    MPV 9 0 8 9 - 12 7 fL    Platelets 662 (L) 122 - 390 Thousands/uL    nRBC 0 /100 WBCs    Neutrophils Relative 79 (H) 43 - 75 %    Immat GRANS % 1 0 - 2 %    Lymphocytes Relative 16 14 - 44 %    Monocytes Relative 4 4 - 12 %    Eosinophils Relative 0 0 - 6 %    Basophils Relative 0 0 - 1 %    Neutrophils Absolute 5 42 1 85 - 7 62 Thousands/µL    Immature Grans Absolute 0 05 0 00 - 0 20 Thousand/uL    Lymphocytes Absolute 1 11 0 60 - 4 47 Thousands/µL    Monocytes Absolute 0 25 0 17 - 1 22 Thousand/µL    Eosinophils Absolute 0 00 0 00 - 0 61 Thousand/µL    Basophils Absolute 0 01 0 00 - 0 10 Thousands/µL       Current Facility-Administered Medications   Medication Dose Route Frequency Provider Last Rate Last Admin    amLODIPine (NORVASC) tablet 10 mg  10 mg Oral Daily Louise Guzman, DO   10 mg at 07/05/22 0929    budesonide (PULMICORT) inhalation solution 0 5 mg  0 5 mg Nebulization BID Louise Guzman, DO   0 5 mg at 07/05/22 0131    busPIRone (BUSPAR) tablet 10 mg  10 mg Oral TID Louise Guzman, DO   10 mg at 07/05/22 0929    carbidopa-levodopa (SINEMET)  mg per tablet 1 tablet  1 tablet Oral TID Louise Guzman, DO   1 tablet at 07/05/22 5771    cholestyramine sugar free (QUESTRAN LIGHT) packet 4 g  4 g Oral Daily Raveena Gandhi, DO   4 g at 07/05/22 0930    enoxaparin (LOVENOX) subcutaneous injection 40 mg  40 mg Subcutaneous Daily Raveena Gandhi, DO   40 mg at 07/05/22 0929    finasteride (PROSCAR) tablet 5 mg  5 mg Oral QAM Raveena Gandhi, DO   5 mg at 07/05/22 0929    fluticasone (FLONASE) 50 mcg/act nasal spray 2 spray  2 spray Nasal Daily Raveena Gandhi, DO   2 spray at 07/04/22 0903    formoterol (PERFOROMIST) nebulizer solution 20 mcg  20 mcg Nebulization Q12H Heavenly Ray MD   20 mcg at 07/05/22 0275    gabapentin (NEURONTIN) capsule 200 mg  200 mg Oral BID Raveena Gandhi, DO   200 mg at 07/05/22 0929    ipratropium-albuterol (DUO-NEB) 0 5-2 5 mg/3 mL inhalation solution 3 mL  3 mL Nebulization Q6H Raveena Gandhi, DO   3 mL at 07/05/22 0723    ipratropium-albuterol (DUO-NEB) 0 5-2 5 mg/3 mL inhalation solution 3 mL  3 mL Nebulization Q4H PRN Louise Guzman, DO   3 mL at 07/04/22 1155    ipratropium-albuterol (DUO-NEB) 0 5-2 5 mg/3 mL inhalation solution 3 mL  3 mL Nebulization Once Maribel Billingsley MD        methylPREDNISolone sodium succinate (Solu-MEDROL) injection 40 mg  40 mg Intravenous Q12H [address] Raveena Gandhi, DO   40 mg at 07/05/22 0930    metoprolol tartrate (LOPRESSOR) tablet 25 mg  25 mg Oral Q12H [address], DO   25 mg at 07/05/22 0929    mirtazapine (REMERON) tablet 30 mg  30 mg Oral HS Raveena Gandhi, DO   30 mg at 07/04/22 2326    pantoprazole (PROTONIX) EC tablet 40 mg  40 mg Oral BID Louise Guzman, DO   40 mg at 07/05/22 1273    tamsulosin (FLOMAX) capsule 0 4 mg  0 4 mg Oral Daily Louise Guzman, DO   0 4 mg at 07/05/22 3405       Invasive Devices  Report    Peripheral Intravenous Line  Duration           Peripheral IV 07/03/22 Left Arm 1 day                Lab, Imaging and other studies: I have personally reviewed pertinent reports      VTE Pharmacologic Prophylaxis: Enoxaparin (Lovenox)  VTE Mechanical Prophylaxis: sequential compression device    Isaak Carlson MD

## 2022-07-05 NOTE — PLAN OF CARE
Problem: Potential for Falls  Goal: Patient will remain free of falls  Description: INTERVENTIONS:  - Educate patient/family on patient safety including physical limitations  - Instruct patient to call for assistance with activity   - Consult OT/PT to assist with strengthening/mobility   - Keep Call bell within reach  - Keep bed low and locked with side rails adjusted as appropriate  - Keep care items and personal belongings within reach  - Initiate and maintain comfort rounds  - Make Fall Risk Sign visible to staff  - Offer Toileting every 2 Hours, in advance of need  - Initiate/Maintain bed alarm  - Obtain necessary fall risk management equipment:   Problem: Prexisting or High Potential for Compromised Skin Integrity  Goal: Skin integrity is maintained or improved  Description: INTERVENTIONS:  - Identify patients at risk for skin breakdown  - Assess and monitor skin integrity  - Assess and monitor nutrition and hydration status  - Monitor labs   - Assess for incontinence   - Turn and reposition patient  - Assist with mobility/ambulation  - Relieve pressure over bony prominences  - Avoid friction and shearing  - Provide appropriate hygiene as needed including keeping skin clean and dry  - Evaluate need for skin moisturizer/barrier cream  - Collaborate with interdisciplinary team   - Patient/family teaching  - Consider wound care consult   Outcome: Progressing     Problem: MOBILITY - ADULT  Goal: Maintain or return to baseline ADL function  Description: INTERVENTIONS:  -  Assess patient's ability to carry out ADLs; assess patient's baseline for ADL function and identify physical deficits which impact ability to perform ADLs (bathing, care of mouth/teeth, toileting, grooming, dressing, etc )  - Assess/evaluate cause of self-care deficits   - Assess range of motion  - Assess patient's mobility; develop plan if impaired  - Assess patient's need for assistive devices and provide as appropriate  - Encourage maximum independence but intervene and supervise when necessary  - Involve family in performance of ADLs  - Assess for home care needs following discharge   - Consider OT consult to assist with ADL evaluation and planning for discharge  - Provide patient education as appropriate  Outcome: Progressing  Goal: Maintains/Returns to pre admission functional level  Description: INTERVENTIONS:  - Perform BMAT or MOVE assessment daily    - Set and communicate daily mobility goal to care team and patient/family/caregiver  - Collaborate with rehabilitation services on mobility goals if consulted  - Perform Range of Motion 3 times a day  - Reposition patient every 2 hours    - Dangle patient 3 times a day  - Stand patient 3 times a day  - Ambulate patient 3 times a day  - Out of bed to chair 3 times a day   - Out of bed for meals 3  Problem: RESPIRATORY - ADULT  Goal: Achieves optimal ventilation and oxygenation  Description: INTERVENTIONS:  - Assess for changes in respiratory status  - Assess for changes in mentation and behavior  - Position to facilitate oxygenation and minimize respiratory effort  - Oxygen administered by appropriate delivery if ordered  - Initiate smoking cessation education as indicated  - Encourage broncho-pulmonary hygiene including cough, deep breathe, Incentive Spirometry  - Assess the need for suctioning and aspirate as needed  - Assess and instruct to report SOB or any respiratory difficulty  - Respiratory Therapy support as indicated  Outcome: Progressing    times a day  - Out of bed for toileting  - Record patient progress and toleration of activity level   Outcome: Progressing     - Apply yellow socks and bracelet for high fall risk patients  - Consider moving patient to room near nurses station  Outcome: Progressing

## 2022-07-05 NOTE — PROGRESS NOTES
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 and extending to posterior aspect of left lower lobe  No change compared to prior studies  No acute PE  Patient would be poor candidate for any type of resection of this  Parkinson's disease  Alpha-1 antitrypsin deficiency  Patient had been getting weekly supplement therapy at home with anti protease but because of recent illness has not had for 1 month  Mild oropharyngeal dysphagia noted on recent modified video fluoroscopic swallow study done by speech therapist on 6/8  Chronic hypoxemic respiratory failure  Patient is on his usual dose of 3 L of oxygen      Plan:  Continue methylprednisone 40 mg IV every 12 hours  When patient is discharged would consider longer taper prednisone and can do prednisone 40 mg for 4 days with 10 mg taper every 5th day  After 10 mg dose is completed then can keep on 5 mg dose for 1 week to see if there is any benefit  Complete PFT can be done a couple weeks after discharge  Although patient could possibly have some benefit from pulmonary rehab may have difficulty getting air due to his ambulatory dysfunction and shortness of breath  Subjective:   Patient states he gets short of breath with walking or doing any other activity  Perhaps slightly better since being admitted to the hospital   Sometimes feels short of breath even standing  States breathing started to get worse several days after prednisone was stopped on recent taper  Also complains of weakness    Objective:     Vitals: Blood pressure 132/73, pulse 65, temperature 98 1 °F (36 7 °C), temperature source Oral, resp  rate 18, height 6' 5" (1 956 m), weight 83 kg (183 lb), SpO2 97 %  ,Body mass index is 21 7 kg/m²  Intake/Output Summary (Last 24 hours) at 7/5/2022 1721  Last data filed at 7/5/2022 0700  Gross per 24 hour   Intake --   Output 800 ml   Net -800 ml       Physical Exam: Physical Exam  Vitals reviewed  Constitutional:       General: He is not in acute distress  Appearance: Normal appearance  He is well-developed  Comments: On 3 liters/minutes nasal cannula oxygen O2 saturation 97%, underweight   HENT:      Head: Normocephalic  Right Ear: External ear normal       Left Ear: External ear normal       Nose: Nose normal       Mouth/Throat:      Mouth: Mucous membranes are moist       Pharynx: Oropharynx is clear  No oropharyngeal exudate  Eyes:      Conjunctiva/sclera: Conjunctivae normal       Pupils: Pupils are equal, round, and reactive to light  Neck:      Vascular: No JVD  Trachea: No tracheal deviation  Cardiovascular:      Rate and Rhythm: Normal rate and regular rhythm  Heart sounds: Normal heart sounds  Pulmonary:      Effort: Pulmonary effort is normal       Comments: Lung sounds reveal few faint expiratory wheezes in both lower lobes  Abdominal:      General: There is no distension  Palpations: Abdomen is soft  Tenderness: There is no abdominal tenderness  There is no guarding  Musculoskeletal:      Cervical back: Neck supple  Comments: No edema   Lymphadenopathy:      Cervical: No cervical adenopathy  Skin:     General: Skin is warm and dry  Findings: No rash  Neurological:      General: No focal deficit present  Mental Status: He is alert and oriented to person, place, and time  Psychiatric:         Behavior: Behavior normal          Thought Content: Thought content normal           Labs: I have personally reviewed pertinent lab results  , ABG:   Lab Results   Component Value Date    PHART 7 430 04/28/2022    EPS1UFC 41 4 04/28/2022    PO2ART 171 2 (H) 04/28/2022    EAR2ZIN 26 9 04/28/2022    BEART 2 3 04/28/2022    SOURCE Radial, Right 04/28/2022   , BNP: No results found for: BNP, CBC:   Lab Results   Component Value Date    WBC 6 84 07/05/2022    HGB 11 1 (L) 07/05/2022    HCT 33 0 (L) 07/05/2022    MCV 96 07/05/2022     (L) 07/05/2022    MCH 32 4 07/05/2022    MCHC 33 6 07/05/2022    RDW 16 4 (H) 07/05/2022    MPV 9 0 07/05/2022    NRBC 0 07/05/2022   , CMP:   Lab Results   Component Value Date     09/27/2016    K 4 5 07/05/2022    K 4 2 09/27/2016     07/05/2022     09/27/2016    CO2 27 07/05/2022    CO2 23 09/27/2016    BUN 25 07/05/2022    BUN 14 09/27/2016    CREATININE 1 19 07/05/2022    CREATININE 1 09 09/27/2016    GLUCOSE 89 09/27/2016    CALCIUM 8 6 07/05/2022    CALCIUM 9 3 09/27/2016    AST 27 07/03/2022    AST 18 09/27/2016    ALT 24 07/03/2022    ALT 16 09/27/2016    ALKPHOS 76 07/03/2022    ALKPHOS 57 09/27/2016    PROT 6 9 09/27/2016    BILITOT 1 0 09/27/2016    EGFR 58 07/05/2022   , PT/INR:   Lab Results   Component Value Date    INR 1 10 04/28/2022   , Troponin: No results found for: TROPONIN    Imaging and other studies: I have personally reviewed pertinent reports     and I have personally reviewed pertinent films in PACS

## 2022-07-06 ENCOUNTER — HOME HEALTH ADMISSION (OUTPATIENT)
Dept: HOME HEALTH SERVICES | Facility: HOME HEALTHCARE | Age: 77
End: 2022-07-06

## 2022-07-06 PROCEDURE — 94640 AIRWAY INHALATION TREATMENT: CPT

## 2022-07-06 PROCEDURE — 99233 SBSQ HOSP IP/OBS HIGH 50: CPT | Performed by: INTERNAL MEDICINE

## 2022-07-06 PROCEDURE — 99232 SBSQ HOSP IP/OBS MODERATE 35: CPT | Performed by: FAMILY MEDICINE

## 2022-07-06 PROCEDURE — 94760 N-INVAS EAR/PLS OXIMETRY 1: CPT

## 2022-07-06 PROCEDURE — 97167 OT EVAL HIGH COMPLEX 60 MIN: CPT

## 2022-07-06 RX ORDER — METHYLPREDNISOLONE SODIUM SUCCINATE 40 MG/ML
40 INJECTION, POWDER, LYOPHILIZED, FOR SOLUTION INTRAMUSCULAR; INTRAVENOUS EVERY 8 HOURS
Status: DISCONTINUED | OUTPATIENT
Start: 2022-07-06 | End: 2022-07-07

## 2022-07-06 RX ADMIN — IPRATROPIUM BROMIDE AND ALBUTEROL SULFATE 3 ML: 2.5; .5 SOLUTION RESPIRATORY (INHALATION) at 13:09

## 2022-07-06 RX ADMIN — PANTOPRAZOLE SODIUM 40 MG: 40 TABLET, DELAYED RELEASE ORAL at 08:45

## 2022-07-06 RX ADMIN — BUSPIRONE HYDROCHLORIDE 10 MG: 10 TABLET ORAL at 08:45

## 2022-07-06 RX ADMIN — CARBIDOPA AND LEVODOPA 1 TABLET: 25; 100 TABLET ORAL at 21:25

## 2022-07-06 RX ADMIN — MIRTAZAPINE 30 MG: 15 TABLET, FILM COATED ORAL at 21:25

## 2022-07-06 RX ADMIN — ENOXAPARIN SODIUM 40 MG: 40 INJECTION SUBCUTANEOUS at 08:46

## 2022-07-06 RX ADMIN — IPRATROPIUM BROMIDE AND ALBUTEROL SULFATE 3 ML: 2.5; .5 SOLUTION RESPIRATORY (INHALATION) at 20:05

## 2022-07-06 RX ADMIN — FINASTERIDE 5 MG: 5 TABLET, FILM COATED ORAL at 08:45

## 2022-07-06 RX ADMIN — GABAPENTIN 200 MG: 100 CAPSULE ORAL at 17:50

## 2022-07-06 RX ADMIN — AMLODIPINE BESYLATE 10 MG: 10 TABLET ORAL at 08:45

## 2022-07-06 RX ADMIN — TAMSULOSIN HYDROCHLORIDE 0.4 MG: 0.4 CAPSULE ORAL at 08:45

## 2022-07-06 RX ADMIN — GABAPENTIN 200 MG: 100 CAPSULE ORAL at 08:46

## 2022-07-06 RX ADMIN — IPRATROPIUM BROMIDE AND ALBUTEROL SULFATE 3 ML: 2.5; .5 SOLUTION RESPIRATORY (INHALATION) at 07:13

## 2022-07-06 RX ADMIN — FORMOTEROL FUMARATE DIHYDRATE 20 MCG: 20 SOLUTION RESPIRATORY (INHALATION) at 20:19

## 2022-07-06 RX ADMIN — CARBIDOPA AND LEVODOPA 1 TABLET: 25; 100 TABLET ORAL at 15:01

## 2022-07-06 RX ADMIN — PANTOPRAZOLE SODIUM 40 MG: 40 TABLET, DELAYED RELEASE ORAL at 17:50

## 2022-07-06 RX ADMIN — BUDESONIDE 0.5 MG: 0.5 INHALANT ORAL at 07:13

## 2022-07-06 RX ADMIN — METOPROLOL TARTRATE 25 MG: 25 TABLET, FILM COATED ORAL at 08:46

## 2022-07-06 RX ADMIN — METOPROLOL TARTRATE 25 MG: 25 TABLET, FILM COATED ORAL at 21:25

## 2022-07-06 RX ADMIN — METHYLPREDNISOLONE SODIUM SUCCINATE 40 MG: 40 INJECTION, POWDER, FOR SOLUTION INTRAMUSCULAR; INTRAVENOUS at 17:50

## 2022-07-06 RX ADMIN — BUDESONIDE 0.5 MG: 0.5 INHALANT ORAL at 20:05

## 2022-07-06 RX ADMIN — CARBIDOPA AND LEVODOPA 1 TABLET: 25; 100 TABLET ORAL at 08:45

## 2022-07-06 RX ADMIN — METHYLPREDNISOLONE SODIUM SUCCINATE 40 MG: 40 INJECTION, POWDER, FOR SOLUTION INTRAMUSCULAR; INTRAVENOUS at 08:45

## 2022-07-06 RX ADMIN — BUSPIRONE HYDROCHLORIDE 10 MG: 10 TABLET ORAL at 21:25

## 2022-07-06 RX ADMIN — IPRATROPIUM BROMIDE AND ALBUTEROL SULFATE 3 ML: 2.5; .5 SOLUTION RESPIRATORY (INHALATION) at 01:47

## 2022-07-06 RX ADMIN — BUSPIRONE HYDROCHLORIDE 10 MG: 10 TABLET ORAL at 15:01

## 2022-07-06 RX ADMIN — FORMOTEROL FUMARATE DIHYDRATE 20 MCG: 20 SOLUTION RESPIRATORY (INHALATION) at 07:12

## 2022-07-06 NOTE — CASE MANAGEMENT
Case Management Assessment & Discharge Planning Note    Patient name Mila Baumgarten  Location 3 [facility] [address]-* MRN [id number]  : 1945 Date 2022       Current Admission Date: 7/3/2022  Current Admission Diagnosis:COPD with acute exacerbation [facility])   Patient Active Problem List    Diagnosis Date Noted    H/O cardiac catheterization 2022    Chest heaviness 2022    Hypertensive urgency 2022    Cognitive impairment 2021    Parkinson's disease ([address] ) 2021    Anxiety 2021    Vitamin D deficiency disease 2021    Avascular necrosis of hip, right ([address] ) 2021    Depression, recurrent ([address] ) 2021    Palliative care patient 11/10/2020    Orthostatic hypotension with spine hypertension 2020    COPD with acute exacerbation ([address] ) 2019    Ambulatory dysfunction 2019    Insomnia 2019    Chronic venous insufficiency 2019    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 ([address] ) 2019    Centrilobular emphysema ([address] ) 2018    CLAYTON (acute kidney injury) ([address] ) 2018    Chronic respiratory failure with hypoxia ([address] ) 2018    Chronic pulmonary embolism ([address] ) 2018    Former smoker 2018    Liver disease     Alpha-1-antitrypsin deficiency ([address] ) 2017    Gastroesophageal reflux disease 2017    Essential hypertension 2017    Acute and chronic respiratory failure with hypoxia ([address] ) 2017    BPH (benign prostatic hyperplasia) 2017    Peripheral neuropathy 2017    Allergic rhinitis 2016    Cataracts, both eyes 2015    Chronic diarrhea  2014    Benign colon polyp 2014      LOS (days): 2  Geometric Mean LOS (GMLOS) (days):   Days to GMLOS:     OBJECTIVE:  PATIENT READMITTED TO HOSPITAL  Risk of Unplanned Readmission Score: 22 55         Current admission status: Inpatient     Preferred Pharmacy:   [id number] - [place] - [address]  [address]  [address] [address]  Phone: [phone] Fax: [phone]    [address] [facility] [address]  [address] [facility] [address]  Phone: [phone] Fax: [phone]    [address], [address]  [address]  [facility] [address]  Phone: [phone] Fax: [phone]    Primary Care Provider: Shannon Ferrara MD    Primary Insurance: MEDICARE  Secondary Insurance: [facility]    ASSESSMENT:  Rik Becerra, [address] - Daughter   Primary Phone: [phone] (Mobile)            Readmission Root Cause  30 Day Readmission: Yes  Patient was readmitted due to: COPD exacerbation  Action Plan: Pulm consult, IV steroids    Patient Information  Admitted from[de-identified] Home  Mental Status: Alert  During Assessment patient was accompanied by: Not accompanied during assessment  Assessment information provided by[de-identified] Patient  Primary Caregiver: Self  Support Systems: Daughter  [place] of Residence: [address] [facility] do you live in?: [place], [address] entry access options   Select all that apply : Stairs  Number of steps to enter home : 1  Type of Current Residence: [place]  In the last 12 months, was there a time when you were not able to pay the mortgage or rent on time?: No  In the last 12 months, how many places have you lived?: 1  In the last 12 months, was there a time when you did not have a steady place to sleep or slept in a shelter (including now)?: No  Homeless/housing insecurity resource given?: N/A  Living Arrangements: Lives Alone  Is patient a ?: No    Activities of Daily Living Prior to Admission  Functional Status: Independent  Completes ADLs independently?: Yes  Ambulates independently?: Yes  Does patient use assisted devices?: Yes  Assisted Devices (DME) used: Portable Oxygen concentrator, Home Oxygen concentrator  DME Company Name (respiratory supplies): [employer]  O2 Rate(s): 3LNC  Does patient currently own DME?: Yes  What DME does the patient currently own?: Walker, Straight Cane, Rollator, Shower Chair, Portable Oxygen concentrator, Home Oxygen concentrator  Does patient have a history of Outpatient Therapy (PT/OT)?: No  Does the patient have a history of Short-Term Rehab?: Yes ([facility])  Does patient have a history of HHC?: Yes  Does patient currently have [facility]?: No     Patient Information Continued  Income Source: Pension/correction  Does patient have prescription coverage?: Yes  Within the past 12 months, you worried that your food would run out before you got the money to buy more : Never true  Within the past 12 months, the food you bought just didn't last and you didn't have money to get more : Never true  Food insecurity resource given?: N/A  Does patient receive dialysis treatments?: No  Does patient have a history of substance abuse?: No  Does patient have a history of Mental Health Diagnosis?: No     Means of Transportation  Means of Transport to Appts[de-identified] Drives Self  In the past 12 months, has lack of transportation kept you from medical appointments or from getting medications?: No  In the past 12 months, has lack of transportation kept you from meetings, work, or from getting things needed for daily living?: No  Was application for public transport provided?: N/A    DISCHARGE DETAILS:    Discharge planning discussed with[de-identified] Patient  Freedom of Choice: Yes  Comments - Freedom of Choice: [facility] listing reviewed  Patient's #1 choice is for referral to [facility] contacted family/caregiver?: Yes  Were Treatment Team discharge recommendations reviewed with patient/caregiver?: Yes  Did patient/caregiver verbalize understanding of patient care needs?: Yes  Were patient/caregiver advised of the risks associated with not following Treatment Team discharge recommendations?: Yes    Contacts  Patient Contacts: Nichole Kurtz  Relationship to Patient[de-identified] Family  Contact Method: In Person  Reason/Outcome: Discharge Planning, Continuity of Care, Emergency [address]         Is the patient interested in [facility] at discharge?: Yes  [address] requested[de-identified] Occupational Therapy, Physical Therapy, [address] Name[de-identified] Other  [address] [facility] Provider[de-identified] PCP  [address] Needed[de-identified] Evaluate Functional Status and Safety, Gait/ADL Training, Oxygen Via Nasal Cannula, COPD Management, Strengthening/Theraputic Exercises to Improve Function  Oxygen LPM Ordered (if applicable based on home health services needed):: 3 LPM  Homebound Criteria Met[de-identified] Uses an Assist Device (i e  cane, walker, etc)  Supporting Clincal Findings[de-identified] Fatigues [place] in [employer], Limited Endurance, Requires Oxygen    DME Referral Provided  Referral made for DME?: No    Other Referral/Resources/Interventions Provided:  Interventions: [facility]    Would you like to participate in our [address] service program?  : No - Declined    Treatment Team Recommendation: Home with [employer]  Discharge Destination Plan[de-identified] Home with [place] at Discharge : Family    SW met with patient to introduce role, complete assessment, and discuss discharge planning needs  Patient states that he was [facility] from [facility] for approx  1 week before he was readmitted to the hospital for COPD exac  Patient states that he was set up with home care services but the nurse was unable to make visit prior to his readmission  He thinks that the agency is [facility] but he is not sure  Patient states that he still wants to return home / [facility] and is not interested in returning to a facility for rehab or LTC  He notes that due to financial constraints he cannot afford to private pay for HHAs   He has a friend and dtr [place] who assists with picking up his medications and food shopping  SW discussed pt's recent mult  Hospitalizations for COPD and suggested a care team meeting to discuss goals of care  Patient was agreeable  [facility] referral sent to [facility]  Per admissions, they think pt is actually open with [facility]  Referral sent  Agency response pending

## 2022-07-06 NOTE — ESCALATED TEAM TX
Team Meeting Note    Patient name Toño Strickland  Location [address] [place], [place] [address]y [address]-* MRN [id number]  : 1945 Date 2022       Team Meeting  Meeting Type: Tx Team Meeting  Initial Conference Date: 22    Team Members Present  Team Members Present: Physician,   Physician Team Member: Pulmonary: Dr Radha Holly; Mary Reynolds Resident: Dr Lyric Marti Work Team Member: Boone Almaguer    Patient/Family Present  Patient Present: Yes  Patient's Family Present: Yes  Family Relationship: Child  Child: Dtr Maryraymundo Melba Team meeting held today to discuss patient's goals of care re his severe COPD given this is his third hospitalization since 2022  Patient follows with pulmonologist Dr Radha Holly, primary care [facility], neurologist Dr Collette Corey, and palliative care physician Dr Soto Given in the community  Pt's last office visits for Pulm, Neuro, and PCP were in May of 2022  Last palliative care appt  Was 2022  Dr Radha Holly gave an overview of patient's worsening COPD dx noting that previous treatments are no longer as effective  Pt agreed with this statement  Pt has been having more dyspnea on exertion which is impacting his ability to complete self-care and home mgmt tasks  Patient states that he has been compliant with his home medication routine  He discussed his practice of organizing with pill boxes and using a friends assistance with picking up meds from the pharmacy  SW inquired on patient's process for when he identifies that he is becoming more SOB and his condition is worsening  Patient states that he typically will just reach out to his visiting nurse (unable to relay name of agency)  Due to possible delays in getting in contact with VNA and then the provider, Dr Radha Holly provided patient with the backline number for the Pulmonary office   Patient was encouraged to contact the office immediately so that there can be a timely intervention and prevent worsening exacerbation  Dtr Deja Mary also provided with this number  Dr Renny Mohr inquired on pt's support at home and ability to get additional assistance versus consider facility placement  Patient states that he does not qualify for medicaid but also does not have the financial resources to be able to pay for private duty HHAs  He does not want to consider LTC at a facility at this time and wants to continue living at home as long as possible  Family is unable to provide additional in-home support as well  SW inquired on when patient would feel that he can no longer safely live at home alone  Pt's answered when he could no longer move around either due to weakness or SOB  Patient nor dtr could identify any other ways that team could help to support pt's success at home  Dr Renny Mohr discussed pt's current medication routine and suggested adding in anxiety medications PRN  He will also be increasing pt's IV steroid dose as he is continuing to wheeze  CODE status was also reviewed  Patient requests to continue as Full Code  Plan at this time is for patient to return home w/ HHC when able  He does have a PCP appt  Scheduled for 7/11 at 14:40 and Pulmonary f/u appt  Scheduled for 7/14 at 11:40  He was supposed to have a Palliative care appt  Yesterday  SW will assist in rescheduling  SW will coordinate discharge handoff with OP CM at [facility]  No additional questions or concerns from patient or family noted at this time

## 2022-07-06 NOTE — PLAN OF CARE
Problem: Prexisting or High Potential for Compromised Skin Integrity  Goal: Skin integrity is maintained or improved  Description: INTERVENTIONS:  - Identify patients at risk for skin breakdown  - Assess and monitor skin integrity  - Assess and monitor nutrition and hydration status  - Monitor labs   - Assess for incontinence   - Turn and reposition patient  - Assist with mobility/ambulation  - Relieve pressure over bony prominences  - Avoid friction and shearing  - Provide appropriate hygiene as needed including keeping skin clean and dry  - Evaluate need for skin moisturizer/barrier cream  - Collaborate with interdisciplinary team   - Patient/family teaching  - Consider wound care consult   Outcome: Progressing     Problem: MOBILITY - ADULT  Goal: Maintain or return to baseline ADL function  Description: INTERVENTIONS:  -  Assess patient's ability to carry out ADLs; assess patient's baseline for ADL function and identify physical deficits which impact ability to perform ADLs (bathing, care of mouth/teeth, toileting, grooming, dressing, etc )  - Assess/evaluate cause of self-care deficits   - Assess range of motion  - Assess patient's mobility; develop plan if impaired  - Assess patient's need for assistive devices and provide as appropriate  - Encourage maximum independence but intervene and supervise when necessary  - Involve family in performance of ADLs  - Assess for home care needs following discharge   - Consider OT consult to assist with ADL evaluation and planning for discharge  - Provide patient education as appropriate  Outcome: Progressing     Problem: RESPIRATORY - ADULT  Goal: Achieves optimal ventilation and oxygenation  Description: INTERVENTIONS:  - Assess for changes in respiratory status  - Assess for changes in mentation and behavior  - Position to facilitate oxygenation and minimize respiratory effort  - Oxygen administered by appropriate delivery if ordered  - Initiate smoking cessation education as indicated  - Encourage broncho-pulmonary hygiene including cough, deep breathe, Incentive Spirometry  - Assess the need for suctioning and aspirate as needed  - Assess and instruct to report SOB or any respiratory difficulty  - Respiratory Therapy support as indicated  Outcome: Progressing

## 2022-07-06 NOTE — PROGRESS NOTES
Formerly [facility] Practice Progress Note - Kathia Sweet 65 y o  male MRN: [id number]    Unit/Bed#: [address] [place] [address] [id number] Encounter: [id number]      Assessment/Plan:  * COPD with acute exacerbation [facility])  Assessment & Plan  Patient with history of AAT deficiency and centrilobular emphysema presents with worsening sob over the last couple days with no relief of symptoms with use of nebulizer at home  He is on home oxygen baseline 2-3 L  ED: IV solumedrol 125mg x1    · Pulmonology Consult - continued recommendations appreciated  · Continue DuoNebs  · Continue Budesonide nebulizer BID  · Added Perforomist nebulizer BID  · Continue Solu-Medrol 40 mg BID  · Incentive spirometry  · Outpatient pulm follow up with PFTs & DLCO  · No need for anticoagulation for old unchanged PE in CTA   · When patient is discharged would consider longer taper prednisone and can do prednisone 40 mg for 4 days with 10 mg taper every 5th day  After 10 mg dose is completed then can keep on 5 mg dose for 1 week to see if there is any benefit  · Continue pulse ox  · Keep SpO2 above 92%    Parkinson's disease (HCC)  Assessment & Plan  Continue sinemet 25-100mg TID    Anxiety  Assessment & Plan  Continue buspar 10mg TID    Depression, recurrent (HCC)  Assessment & Plan  Continue remeron 30mg QHS    Peripheral neuropathy  Assessment & Plan  Continue gabapentin 200mg BID    BPH (benign prostatic hyperplasia)  Assessment & Plan  Continue home flomax 0 4mg daily and finasteride 5mg daily    Essential hypertension  Assessment & Plan  BP elevated with -190 on admission, missed evening dose of antihypertensives  · Received IV hydralazine 5mg x1  · Continue home norvasc 10mg daily and metoprolol 25mg BID  · Continue to monitor    Alpha-1-antitrypsin deficiency [facility])  Assessment & Plan  Receives weekly Zemaira infusion    Will confirm last dose and order for weekly dose accordingly    CKD (chronic kidney disease) stage 2, GFR 60-89 ml/min-resolved as of 7/5/2022  Assessment & Plan  Lab Results   Component Value Date    EGFR 58 07/05/2022    EGFR 48 07/03/2022    EGFR 63 06/09/2022    CREATININE 1 19 07/05/2022    CREATININE 1 39 (H) 07/03/2022    CREATININE 1 11 06/09/2022   BL Cr 1 1-1 2  Continue to monitor          Subjective:   Patient resting comfortably in bed upon examination on 3 L of oxygen which is the same as his home requirement  He reports shortness of breath with exertion which is increased from his baseline  He denies any chest pain, nausea, vomiting, diarrhea, or change in urinary symptoms  Objective:     Vitals: Blood pressure 156/72, pulse 65, temperature 97 9 °F (36 6 °C), temperature source Axillary, resp  rate 18, height 6' 5" (1 956 m), weight 83 5 kg (184 lb), SpO2 96 %  ,Body mass index is 21 82 kg/m²  Wt Readings from Last 3 Encounters:   07/06/22 83 5 kg (184 lb)   06/22/22 83 2 kg (183 lb 8 oz)   06/20/22 83 2 kg (183 lb 8 oz)       Intake/Output Summary (Last 24 hours) at 7/6/2022 0910  Last data filed at 7/6/2022 0600  Gross per 24 hour   Intake 240 ml   Output 800 ml   Net -560 ml       Physical Exam:  Physical Exam  Constitutional:       General: He is not in acute distress  Appearance: He is not ill-appearing, toxic-appearing or diaphoretic  HENT:      Head: Normocephalic  Mouth/Throat:      Mouth: Mucous membranes are moist    Cardiovascular:      Rate and Rhythm: Normal rate and regular rhythm  Pulses: Normal pulses  Heart sounds: Normal heart sounds  No murmur heard  Pulmonary:      Effort: Pulmonary effort is normal       Breath sounds: Normal breath sounds  No wheezing  Abdominal:      General: Bowel sounds are normal       Tenderness: There is no abdominal tenderness  Musculoskeletal:      Right lower leg: No edema  Left lower leg: No edema  Skin:     Capillary Refill: Capillary refill takes less than 2 seconds     Neurological:      Mental Status: He is alert and oriented to person, place, and time  No results found for this or any previous visit (from the past 24 hour(s))      Current Facility-Administered Medications   Medication Dose Route Frequency Provider Last Rate Last Admin    amLODIPine (NORVASC) tablet 10 mg  10 mg Oral Daily Louise Guzman, DO   10 mg at 07/06/22 0845    budesonide (PULMICORT) inhalation solution 0 5 mg  0 5 mg Nebulization BID Louise Guzman, DO   0 5 mg at 07/06/22 9719    busPIRone (BUSPAR) tablet 10 mg  10 mg Oral TID [place] Citron, DO   10 mg at 07/06/22 0845    carbidopa-levodopa (SINEMET)  mg per tablet 1 tablet  1 tablet Oral TID [place] Citron, DO   1 tablet at 07/06/22 0845    cholestyramine sugar free (QUESTRAN LIGHT) packet 4 g  4 g Oral Daily Louise Guzman, DO   4 g at 07/05/22 0930    enoxaparin (LOVENOX) subcutaneous injection 40 mg  40 mg Subcutaneous Daily Louise Guzman, DO   40 mg at 07/06/22 0846    finasteride (PROSCAR) tablet 5 mg  5 mg Oral QAM Louise Guzman, DO   5 mg at 07/06/22 0845    fluticasone (FLONASE) 50 mcg/act nasal spray 2 spray  2 spray Nasal Daily Louise Guzman, DO   2 spray at 07/04/22 0903    formoterol (PERFOROMIST) nebulizer solution 20 mcg  20 mcg Nebulization Q12H Vannessa Dubon MD   20 mcg at 07/06/22 6521    gabapentin (NEURONTIN) capsule 200 mg  200 mg Oral BID Louise Guzman, DO   200 mg at 07/06/22 0846    ipratropium-albuterol (DUO-NEB) 0 5-2 5 mg/3 mL inhalation solution 3 mL  3 mL Nebulization Q6H Louise Guzman, DO   3 mL at 07/06/22 0713    ipratropium-albuterol (DUO-NEB) 0 5-2 5 mg/3 mL inhalation solution 3 mL  3 mL Nebulization Q4H PRN Louise Guzman, DO   3 mL at 07/04/22 1155    ipratropium-albuterol (DUO-NEB) 0 5-2 5 mg/3 mL inhalation solution 3 mL  3 mL Nebulization Once Jocy Flowers MD        methylPREDNISolone sodium succinate (Solu-MEDROL) injection 40 mg  40 mg Intravenous Q12H [address] Louise Guzman DO   40 mg at 07/06/22 0845    metoprolol tartrate (LOPRESSOR) tablet 25 mg  25 mg Oral Q12H [address], DO   25 mg at 07/06/22 0846    mirtazapine (REMERON) tablet 30 mg  30 mg Oral HS Raveena Gandhi, DO   30 mg at 07/05/22 2156    pantoprazole (PROTONIX) EC tablet 40 mg  40 mg Oral BID Raveena Gandhi, DO   40 mg at 07/06/22 0845    tamsulosin (FLOMAX) capsule 0 4 mg  0 4 mg Oral Daily Louiseeena Gandhi, DO   0 4 mg at 07/06/22 0845       Invasive Devices  Report    Peripheral Intravenous Line  Duration           Peripheral IV 07/03/22 Left Arm 2 days                Lab, Imaging and other studies: I have personally reviewed pertinent reports      VTE Pharmacologic Prophylaxis: Enoxaparin (Lovenox)  VTE Mechanical Prophylaxis: sequential compression device    Payal Graves MD

## 2022-07-06 NOTE — OCCUPATIONAL THERAPY NOTE
OT EVALUATION     07/06/22 1051   Note Type   Note type Evaluation   Restrictions/Precautions   Other Precautions Bed Alarm; Chair Alarm;O2;Fall Risk  (3L O2)   Pain Assessment   Pain Assessment Tool 0-10   Pain Score No Pain   Home Living   Type of [address] One level;Stairs to enter with rails  (2 TEMI)   Bathroom Shower/Tub Walk-in shower   Bathroom Toilet Raised   Bathroom Equipment Grab bars in shower;Grab bars around toilet; Toilet raiser; Shower chair   Home Equipment Cane  (Rollator, 3L O2 24/7 at home)   Additional Comments Pt reports ambulating with a cane PTA  Prior Function   Level of [place] Independent with ADLs and functional mobility; Needs assistance with IADLs   Lives With Alone   Receives Help From Family; Other (Comment)  (Cleaning lady comes every other week; pt daughter gets groceries weekly)   ADL Assistance Independent   IADLs Needs assistance   Vocational Retired   Comments Patient admitted with a few days of SOB  Recently admitted for COPD exacerbation, followed by SNF stay  Pt reports being home for about a week  Subjective   Subjective "I pretty much go as far as the couch"   ADL   Eating Assistance 5  Supervision/Setup   Grooming Assistance 5  Supervision/Setup   UB Bathing Assistance 4  Minimal Assistance   LB Bathing Assistance 3  Moderate Assistance   [address] 4  Minimal [address] 3  Moderate Assistance   LB Dressing Deficit Don/doff R sock  (Patient instructed to use purse lip breathing throughout greg/doffing R sock and to take frequent rest breaks throughout the task )   Toileting Assistance  3  Moderate Assistance   Additional Comments Patient reports eating mostly microwaveable meals at home, and ambulating short distances to and from his couch  He reports it takes him increased time to complete ADL/IADL tasks due SOB     Bed Mobility   Supine to Sit 5  Supervision   Additional Comments Patient remained out of bed in recliner chair at the end of the session   Transfers   Sit to Stand 4  Minimal assistance   Additional items Assist x 1;Verbal cues; Increased time required   Stand to Sit 4  Minimal assistance   Additional items Assist x 1;Verbal cues; Increased time required   Functional Mobility   Functional Mobility 4  Minimal assistance   Additional Comments Patient ambulated short household distance with min assist and use of RW  Patient with SOB upon excertion - "It does not take much for me to get short of breath" Pt educated on purse lip breathing and encouraged to take rest breaks throughout activity  Pt verbalized and demonstrated understanding  Balance   Static Sitting Good   Dynamic Sitting Fair +   Static Standing Fair   Dynamic Standing Fair -   Activity Tolerance   Activity Tolerance Patient limited by fatigue;Treatment limited secondary to medical complications (Comment)  (Shortness of breath)   RUE Assessment   RUE Assessment WFL   LUE Assessment   LUE Assessment WFL   Cognition   Overall Cognitive Status WFL   Arousal/Participation Alert; Responsive; Cooperative   Attention Within functional limits   Orientation Level Oriented X4   Following Commands Follows all commands and directions without difficulty   Assessment   Limitation Decreased ADL status; Decreased UE strength;Decreased endurance;Decreased self-care trans;Decreased high-level ADLs   Prognosis Good   Assessment Patient evaluated by Occupational Therapy  Patient admitted with COPD with acute exacerbation ([facility] [address] )  The patients occupational profile, medical and therapy history includes a extensive additional review of physical, cognitive, or psychosocial history related to current functional performance  Comorbidities affecting functional mobility and ADLS include: arthritis, cancer, CKD, COPD and hypertension  Prior to admission, patient was independent with functional mobility with a cane and requiring assist for IADLS    The evaluation identifies the following performance deficits: weakness, decreased endurance, increased fall risk, decreased ADLS, decreased IADLS, decreased activity tolerance, SOB upon exertion and decreased strength, that result in activity limitations and/or participation restrictions  This evaluation requires clinical decision making of high complexity, because the patient presents with comorbidites that affect occupational performance and required significant modification of tasks or assistance with consideration of multiple treatment options  The Barthel Index was used as a functional outcome tool presenting with a score of Barthel Index Score: 45, indicating marked limitations of functional mobility and ADLS  The patient's raw score on the -PAC Daily Activity inpatient short form is 15, standardized score is 34 69, less than 39 4  Patients at this level are likely to benefit from DC to post-acute rehabilitation services  However, patient would benefit from home with OT services  Patient is close to baseline with ADLS/IADLS and has a supportive family  Please refer to the recommendation of the Occupational Therapist for safe DC planning  Patient will benefit from skilled Occupational Therapy services to address above deficits and facilitate a safe return to prior level of function  Goals   Patient Goals to breathe better in order to complete tasks   STG Time Frame   (1-7 days)   Short Term Goal  Goals established to promote Patient Goals: to breathe better in order to complete tasks:  Patient will increase standing tolerance to 5 minutes during ADL task to decrease assistance level and decrease fall risk; Patient will increase bed mobility to independent in preparation for ADLS and transfers;  Patient will increase functional mobility to and from bathroom with rolling walker with supervision to increase performance with ADLS and to use a toilet; Patient will tolerate 5 minutes of UE ROM/strengthening to increase general activity tolerance and performance in ADLS/IADLS; Patient will improve functional activity tolerance to 5 minutes of sustained functional tasks to increase participation in basic self-care and decrease assistance level;  Patient will be able to to verbalize understanding and perform energy conservation/proper body mechanics during ADLS and functional mobility at least 75% of the time with minimal cueing to decrease signs of fatigue and increase stamina to return to prior level of function; Patient will increase dynamic sitting balance to good to improve the ability to sit at edge of bed or on a chair for ADLS;  Patient will increase dynamic standing balance to fair to improve postural stability and decrease fall risk during standing ADLS and transfers  Patient will demonstrate purse lip breathing during functional activities with moderate cues 70% of the time  LTG Time Frame   (8-14 days)   Long Term Goal Patient will increase standing tolerance to 10 minutes during ADL task to decrease assistance level and decrease fall risk;  Patient will increase functional mobility to and from bathroom with rolling walker independently to increase performance with ADLS and to use a toilet; Patient will tolerate 10 minutes of UE ROM/strengthening to increase general activity tolerance and performance in ADLS/IADLS; Patient will improve functional activity tolerance to 10 minutes of sustained functional tasks to increase participation in basic self-care and decrease assistance level;  Patient will be able to to verbalize understanding and perform energy conservation/proper body mechanics during ADLS and functional mobility at least 90% of the time with no cueing to decrease signs of fatigue and increase stamina to return to prior level of function;Patient will increase static/dynamic standing balance to fair+ to improve postural stability and decrease fall risk during standing ADLS and transfers   Patient will demonstrate purse lip breathing during functional activities with minimal cues 90% of the time  Pt will score >/= 19/24 on AM-PAC Daily Activity Inpatient scale to promote safe independence with ADLs and functional mobility; Pt will score >/= 75/100 on Barthel Index in order to decrease caregiver assistance needed and increase ability to perform ADLs and functional mobility  Functional Transfer Goals   Pt Will Perform All Functional Transfers   (STG supervison LTG independent)   ADL Goals   Pt Will Perform Eating   (STG independent)   Pt Will Perform Grooming   (STG independent)   Pt Will Perform Bathing   (STG min assist LTG supervision)   Pt Will Perform UE Dressing   (STG supervision LTG independent)   Pt Will Perform LE Dressing   (STG min assist LTG supervision)   Pt Will Perform Toileting   (STG min assist LTG supervision)   Plan   Treatment Interventions ADL retraining;Functional transfer training;UE strengthening/ROM; Endurance training;Patient/family training;Equipment evaluation/education; Energy conservation; Activityengagement; Compensatory technique education   Goal Expiration Date 07/20/22   OT Frequency 3-5x/wk   Recommendation   OT Discharge Recommendation Home with home health rehabilitation   AM-[facility] Daily Activity Inpatient   Lower Body Dressing 2   Bathing 2   Toileting 2   Upper Body Dressing 3   Grooming 3   Eating 3   Daily Activity Raw Score 15   Daily Activity Standardized Score (Calc for Raw Score >=11) 34 69   AM-PAC Applied Cognition Inpatient   Following a Speech/Presentation 4   Understanding Ordinary Conversation 4   Taking Medications 4   Remembering Where Things Are Placed or Put Away 4   Remembering List of 4-5 Errands 4   Taking Care of Complicated Tasks 4   Applied Cognition Raw Score 24   Applied Cognition Standardized Score 62 21   Barthel Index   Feeding 5   Bathing 0   Grooming Score 0   Dressing Score 5   Bladder Score 10   Bowels Score 10   Toilet Use Score 5   Transfers (Bed/Chair) Score 10 Mobility (Level Surface) Score 0   Stairs Score 0   Barthel Index Score 45   Licensure   [place] License Number  [employer], OTS

## 2022-07-06 NOTE — PLAN OF CARE
Problem: Potential for Falls  Goal: Patient will remain free of falls  Description: INTERVENTIONS:  - Educate patient/family on patient safety including physical limitations  - Instruct patient to call for assistance with activity   - Consult OT/PT to assist with strengthening/mobility   - Keep Call bell within reach  - Keep bed low and locked with side rails adjusted as appropriate  - Keep care items and personal belongings within reach  - Initiate and maintain comfort rounds  - Make Fall Risk Sign visible to staff  - Apply yellow socks and bracelet for high fall risk patients  - Consider moving patient to room near nurses station  Outcome: Progressing     Problem: Prexisting or High Potential for Compromised Skin Integrity  Goal: Skin integrity is maintained or improved  Description: INTERVENTIONS:  - Identify patients at risk for skin breakdown  - Assess and monitor skin integrity  - Assess and monitor nutrition and hydration status  - Monitor labs   - Assess for incontinence   - Turn and reposition patient  - Assist with mobility/ambulation  - Relieve pressure over bony prominences  - Avoid friction and shearing  - Provide appropriate hygiene as needed including keeping skin clean and dry  - Evaluate need for skin moisturizer/barrier cream  - Collaborate with interdisciplinary team   - Patient/family teaching  - Consider wound care consult   Outcome: Progressing     Problem: MOBILITY - ADULT  Goal: Maintain or return to baseline ADL function  Description: INTERVENTIONS:  -  Assess patient's ability to carry out ADLs; assess patient's baseline for ADL function and identify physical deficits which impact ability to perform ADLs (bathing, care of mouth/teeth, toileting, grooming, dressing, etc )  - Assess/evaluate cause of self-care deficits   - Assess range of motion  - Assess patient's mobility; develop plan if impaired  - Assess patient's need for assistive devices and provide as appropriate  - Encourage maximum independence but intervene and supervise when necessary  - Involve family in performance of ADLs  - Assess for home care needs following discharge   - Consider OT consult to assist with ADL evaluation and planning for discharge  - Provide patient education as appropriate  Outcome: Progressing  Goal: Maintains/Returns to pre admission functional level  Description: INTERVENTIONS:  - Perform BMAT or MOVE assessment daily    - Set and communicate daily mobility goal to care team and patient/family/caregiver     - Collaborate with rehabilitation services on mobility goals if consulted  - Record patient progress and toleration of activity level   Outcome: Progressing     Problem: RESPIRATORY - ADULT  Goal: Achieves optimal ventilation and oxygenation  Description: INTERVENTIONS:  - Assess for changes in respiratory status  - Assess for changes in mentation and behavior  - Position to facilitate oxygenation and minimize respiratory effort  - Oxygen administered by appropriate delivery if ordered  - Initiate smoking cessation education as indicated  - Encourage broncho-pulmonary hygiene including cough, deep breathe, Incentive Spirometry  - Assess the need for suctioning and aspirate as needed  - Assess and instruct to report SOB or any respiratory difficulty  - Respiratory Therapy support as indicated  Outcome: Progressing

## 2022-07-07 LAB
ANION GAP SERPL CALCULATED.3IONS-SCNC: 10 MMOL/L (ref 4–13)
BUN SERPL-MCNC: 33 MG/DL (ref 5–25)
CALCIUM SERPL-MCNC: 8.7 MG/DL (ref 8.3–10.1)
CHLORIDE SERPL-SCNC: 103 MMOL/L (ref 100–108)
CO2 SERPL-SCNC: 25 MMOL/L (ref 21–32)
CREAT SERPL-MCNC: 0.93 MG/DL (ref 0.6–1.3)
ERYTHROCYTE [DISTWIDTH] IN BLOOD BY AUTOMATED COUNT: 15.8 % (ref 11.6–15.1)
GFR SERPL CREATININE-BSD FRML MDRD: 78 ML/MIN/1.73SQ M
GLUCOSE SERPL-MCNC: 132 MG/DL (ref 65–140)
HCT VFR BLD AUTO: 34.5 % (ref 36.5–49.3)
HGB BLD-MCNC: 11.5 G/DL (ref 12–17)
MCH RBC QN AUTO: 32.2 PG (ref 26.8–34.3)
MCHC RBC AUTO-ENTMCNC: 33.3 G/DL (ref 31.4–37.4)
MCV RBC AUTO: 97 FL (ref 82–98)
PLATELET # BLD AUTO: 149 THOUSANDS/UL (ref 149–390)
PMV BLD AUTO: 9.6 FL (ref 8.9–12.7)
POTASSIUM SERPL-SCNC: 4.6 MMOL/L (ref 3.5–5.3)
RBC # BLD AUTO: 3.57 MILLION/UL (ref 3.88–5.62)
SODIUM SERPL-SCNC: 138 MMOL/L (ref 136–145)
WBC # BLD AUTO: 7.72 THOUSAND/UL (ref 4.31–10.16)

## 2022-07-07 PROCEDURE — 99232 SBSQ HOSP IP/OBS MODERATE 35: CPT | Performed by: INTERNAL MEDICINE

## 2022-07-07 PROCEDURE — 94640 AIRWAY INHALATION TREATMENT: CPT

## 2022-07-07 PROCEDURE — 85027 COMPLETE CBC AUTOMATED: CPT | Performed by: STUDENT IN AN ORGANIZED HEALTH CARE EDUCATION/TRAINING PROGRAM

## 2022-07-07 PROCEDURE — 99232 SBSQ HOSP IP/OBS MODERATE 35: CPT | Performed by: FAMILY MEDICINE

## 2022-07-07 PROCEDURE — 97110 THERAPEUTIC EXERCISES: CPT

## 2022-07-07 PROCEDURE — 97530 THERAPEUTIC ACTIVITIES: CPT

## 2022-07-07 PROCEDURE — 80048 BASIC METABOLIC PNL TOTAL CA: CPT | Performed by: STUDENT IN AN ORGANIZED HEALTH CARE EDUCATION/TRAINING PROGRAM

## 2022-07-07 PROCEDURE — 94760 N-INVAS EAR/PLS OXIMETRY 1: CPT

## 2022-07-07 RX ORDER — METHYLPREDNISOLONE SODIUM SUCCINATE 40 MG/ML
40 INJECTION, POWDER, LYOPHILIZED, FOR SOLUTION INTRAMUSCULAR; INTRAVENOUS EVERY 12 HOURS SCHEDULED
Status: DISCONTINUED | OUTPATIENT
Start: 2022-07-08 | End: 2022-07-09 | Stop reason: HOSPADM

## 2022-07-07 RX ADMIN — FORMOTEROL FUMARATE DIHYDRATE 20 MCG: 20 SOLUTION RESPIRATORY (INHALATION) at 19:43

## 2022-07-07 RX ADMIN — AMLODIPINE BESYLATE 10 MG: 10 TABLET ORAL at 08:15

## 2022-07-07 RX ADMIN — BUDESONIDE 0.5 MG: 0.5 INHALANT ORAL at 19:56

## 2022-07-07 RX ADMIN — IPRATROPIUM BROMIDE AND ALBUTEROL SULFATE 3 ML: 2.5; .5 SOLUTION RESPIRATORY (INHALATION) at 19:56

## 2022-07-07 RX ADMIN — PANTOPRAZOLE SODIUM 40 MG: 40 TABLET, DELAYED RELEASE ORAL at 08:15

## 2022-07-07 RX ADMIN — FINASTERIDE 5 MG: 5 TABLET, FILM COATED ORAL at 08:15

## 2022-07-07 RX ADMIN — BUDESONIDE 0.5 MG: 0.5 INHALANT ORAL at 07:22

## 2022-07-07 RX ADMIN — METHYLPREDNISOLONE SODIUM SUCCINATE 40 MG: 40 INJECTION, POWDER, FOR SOLUTION INTRAMUSCULAR; INTRAVENOUS at 16:29

## 2022-07-07 RX ADMIN — MIRTAZAPINE 30 MG: 15 TABLET, FILM COATED ORAL at 21:44

## 2022-07-07 RX ADMIN — GABAPENTIN 200 MG: 100 CAPSULE ORAL at 17:21

## 2022-07-07 RX ADMIN — METOPROLOL TARTRATE 25 MG: 25 TABLET, FILM COATED ORAL at 08:15

## 2022-07-07 RX ADMIN — IPRATROPIUM BROMIDE AND ALBUTEROL SULFATE 3 ML: 2.5; .5 SOLUTION RESPIRATORY (INHALATION) at 13:23

## 2022-07-07 RX ADMIN — PANTOPRAZOLE SODIUM 40 MG: 40 TABLET, DELAYED RELEASE ORAL at 17:21

## 2022-07-07 RX ADMIN — CARBIDOPA AND LEVODOPA 1 TABLET: 25; 100 TABLET ORAL at 21:44

## 2022-07-07 RX ADMIN — BUSPIRONE HYDROCHLORIDE 10 MG: 10 TABLET ORAL at 08:15

## 2022-07-07 RX ADMIN — BUSPIRONE HYDROCHLORIDE 10 MG: 10 TABLET ORAL at 21:44

## 2022-07-07 RX ADMIN — GABAPENTIN 200 MG: 100 CAPSULE ORAL at 08:15

## 2022-07-07 RX ADMIN — BUSPIRONE HYDROCHLORIDE 10 MG: 10 TABLET ORAL at 16:28

## 2022-07-07 RX ADMIN — IPRATROPIUM BROMIDE AND ALBUTEROL SULFATE 3 ML: 2.5; .5 SOLUTION RESPIRATORY (INHALATION) at 01:43

## 2022-07-07 RX ADMIN — METOPROLOL TARTRATE 25 MG: 25 TABLET, FILM COATED ORAL at 21:45

## 2022-07-07 RX ADMIN — IPRATROPIUM BROMIDE AND ALBUTEROL SULFATE 3 ML: 2.5; .5 SOLUTION RESPIRATORY (INHALATION) at 07:21

## 2022-07-07 RX ADMIN — CARBIDOPA AND LEVODOPA 1 TABLET: 25; 100 TABLET ORAL at 08:15

## 2022-07-07 RX ADMIN — ENOXAPARIN SODIUM 40 MG: 40 INJECTION SUBCUTANEOUS at 08:15

## 2022-07-07 RX ADMIN — FORMOTEROL FUMARATE DIHYDRATE 20 MCG: 20 SOLUTION RESPIRATORY (INHALATION) at 07:45

## 2022-07-07 RX ADMIN — CARBIDOPA AND LEVODOPA 1 TABLET: 25; 100 TABLET ORAL at 16:28

## 2022-07-07 RX ADMIN — TAMSULOSIN HYDROCHLORIDE 0.4 MG: 0.4 CAPSULE ORAL at 08:15

## 2022-07-07 RX ADMIN — METHYLPREDNISOLONE SODIUM SUCCINATE 40 MG: 40 INJECTION, POWDER, FOR SOLUTION INTRAMUSCULAR; INTRAVENOUS at 08:15

## 2022-07-07 RX ADMIN — METHYLPREDNISOLONE SODIUM SUCCINATE 40 MG: 40 INJECTION, POWDER, FOR SOLUTION INTRAMUSCULAR; INTRAVENOUS at 00:59

## 2022-07-07 NOTE — PLAN OF CARE
Problem: Potential for Falls  Goal: Patient will remain free of falls  Description: INTERVENTIONS:  - Educate patient/family on patient safety including physical limitations  - Instruct patient to call for assistance with activity   - Consult OT/PT to assist with strengthening/mobility   - Keep Call bell within reach  - Keep bed low and locked with side rails adjusted as appropriate  - Keep care items and personal belongings within reach  - Initiate and maintain comfort rounds  - Make Fall Risk Sign visible to staff  - Offer Toileting every 2 Hours, in advance of need  - Initiate/Maintain bed alarm  - Apply yellow socks and bracelet for high fall risk patients  - Consider moving patient to room near nurses station  Outcome: Progressing     Problem: Prexisting or High Potential for Compromised Skin Integrity  Goal: Skin integrity is maintained or improved  Description: INTERVENTIONS:  - Identify patients at risk for skin breakdown  - Assess and monitor skin integrity  - Assess and monitor nutrition and hydration status  - Monitor labs   - Assess for incontinence   - Turn and reposition patient  - Assist with mobility/ambulation  - Relieve pressure over bony prominences  - Avoid friction and shearing  - Provide appropriate hygiene as needed including keeping skin clean and dry  - Evaluate need for skin moisturizer/barrier cream  - Collaborate with interdisciplinary team   - Patient/family teaching  - Consider wound care consult   Outcome: Progressing     Problem: MOBILITY - ADULT  Goal: Maintain or return to baseline ADL function  Description: INTERVENTIONS:  -  Assess patient's ability to carry out ADLs; assess patient's baseline for ADL function and identify physical deficits which impact ability to perform ADLs (bathing, care of mouth/teeth, toileting, grooming, dressing, etc )  - Assess/evaluate cause of self-care deficits   - Assess range of motion  - Assess patient's mobility; develop plan if impaired  - Assess patient's need for assistive devices and provide as appropriate  - Encourage maximum independence but intervene and supervise when necessary  - Involve family in performance of ADLs  - Assess for home care needs following discharge   - Consider OT consult to assist with ADL evaluation and planning for discharge  - Provide patient education as appropriate  Outcome: Progressing  Goal: Maintains/Returns to pre admission functional level  Description: INTERVENTIONS:  - Perform BMAT or MOVE assessment daily    - Set and communicate daily mobility goal to care team and patient/family/caregiver  - Collaborate with rehabilitation services on mobility goals if consulted  - Perform Range of Motion 2 times a day  - Reposition patient every 2 hours    - Dangle patient 2 times a day  - Stand patient 2 times a day  - Ambulate patient 2 times a day  - Out of bed to chair 2 times a day   - Out of bed for meals 2  Problem: RESPIRATORY - ADULT  Goal: Achieves optimal ventilation and oxygenation  Description: INTERVENTIONS:  - Assess for changes in respiratory status  - Assess for changes in mentation and behavior  - Position to facilitate oxygenation and minimize respiratory effort  - Oxygen administered by appropriate delivery if ordered  - Initiate smoking cessation education as indicated  - Encourage broncho-pulmonary hygiene including cough, deep breathe, Incentive Spirometry  - Assess the need for suctioning and aspirate as needed  - Assess and instruct to report SOB or any respiratory difficulty  - Respiratory Therapy support as indicated  Outcome: Progressing    times a day  - Out of bed for toileting  - Record patient progress and toleration of activity level   Outcome: Progressing

## 2022-07-07 NOTE — PHYSICAL THERAPY NOTE
PT TREATMENT     07/07/22 1425   PT Last Visit   PT Visit Date 07/07/22   Note Type   Note Type Treatment   Pain Assessment   Pain Assessment Tool 0-10   Pain Score No Pain   Restrictions/Precautions   Weight Bearing Precautions Per Order No   Other Precautions Chair Alarm; Bed Alarm;O2;Fall Risk   General   Chart Reviewed Yes   Family/Caregiver Present No   Cognition   Overall Cognitive Status WFL   Arousal/Participation Cooperative   Attention Within functional limits   Orientation Level Oriented X4   Following Commands Follows all commands and directions without difficulty   Subjective   Subjective Feeling a little dizzy when first sitting up at EOB   Bed Mobility   Supine to Sit 5  Supervision   Sit to Supine 5  Supervision   Additional Comments pt declined sitting in chair, requested to return to bed   Transfers   Sit to Stand 5  Supervision   Additional items Verbal cues   Stand to Sit 5  Supervision   Additional items Verbal cues   Ambulation/Elevation   Gait pattern Forward Flexion   Gait Assistance 5  Supervision   Additional items Verbal cues   Assistive Device Rolling walker   Distance 20 feet x 2 with change in direction  (on 3L of O2 via NC)   Balance   Ambulatory Fair   Activity Tolerance   Activity Tolerance Patient limited by fatigue  (limited by mild SOB, however sats remained in the 90's with all activity)   Exercises   Hip Flexion Sitting;10 reps;Bilateral   Knee AROM Long Arc Quad Sitting;10 reps;Bilateral   Ankle Pumps Sitting;10 reps;Bilateral   Balance training  use of RW for making turns in room with supervision and some verbal cues  Assessment   Prognosis Good   Problem List Decreased strength;Decreased endurance; Impaired balance;Decreased mobility   Assessment Pt ambulated around room with RW for 20 feet with 3L of O2 (92%), rest  Completed:BLE exercises as stated above  (94%) Rest   Pt then able to walk another 20 feet around room with RW and supervision (95%) Rest  Returned to supine at end of session  A: Pt tolerated well  Was pleased that his sats remained in the 90s during PT session  P: Cont  as per plan  The patient's AM-PAC Basic Mobility Inpatient Short Form Raw Score is 20  A Raw score of greater than 16 suggests the patient may benefit from discharge to home  Please also refer to the recommendation of the Physical Therapist for safe discharge planning  Goals   Patient Goals to go home tomorrow  Plan   Treatment/Interventions ADL retraining;Functional transfer training;LE strengthening/ROM; Therapeutic exercise; Endurance training;Patient/family training;Equipment eval/education; Bed mobility;Gait training;Spoke to MD;Spoke to nursing;Spoke to case management   Progress Progressing toward goals   PT Frequency Other (Comment)  (5/w)   Recommendation   PT Discharge Recommendation Home with home health rehabilitation   AM-PAC Basic Mobility Inpatient   Turning in Bed Without Bedrails 4   Lying on Back to Sitting on Edge of Flat Bed 4   Moving Bed to Chair 4   Standing Up From Chair 3   Walk in Room 3   Climb 3-5 Stairs 2   Basic Mobility Inpatient Raw Score 20   Basic Mobility Standardized Score 43 99   Highest Level Of Mobility   JH-HLM Goal 6: Walk 10 steps or more   JH-HLM Achieved 7: Walk 25 feet or more   End of Consult   Patient Position at End of Consult Supine; All needs within reach;Bed/Chair alarm activated   Licensure   [place] License Number  [facility] PT  [id number]

## 2022-07-07 NOTE — PROGRESS NOTES
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 extending to the posterior aspect left lower lobe  Lore Aguila remained stable although prior scans going back to November of 2021  Patient is not candidate for any type of surgical intervention for this  Chronic hypoxemic respiratory failure  Is on usual dose of 3 liters/minute nasal cannula oxygen    Plan:  I along with  Kirk Martínez and family practice resident discussed with patient goals of care  We discussed diagnosis and treatment  We also touched on how to adjust medication home to hopefully prevent any flare-up of his COPD and need hospitalization  His daughter Canelo Beckett also participated in conversation  For now would increase Solu-Medrol 40 mg every 12 hours to every 8 hours  Check a couple sets orthostatic blood pressure readings  Continue neb treatments with DuoNeb 4 times a day      Subjective:   Short of breath with any activity and does get lightheaded sometimes when he stands up  Has periodic nonproductive cough    Objective:     Vitals: Blood pressure 144/67, pulse 67, temperature 98 3 °F (36 8 °C), temperature source Oral, resp  rate 20, height 6' 5" (1 956 m), weight 83 5 kg (184 lb), SpO2 98 %  ,Body mass index is 21 82 kg/m²  Intake/Output Summary (Last 24 hours) at 7/6/2022 2135  Last data filed at 7/6/2022 0600  Gross per 24 hour   Intake 240 ml   Output 400 ml   Net -160 ml       Physical Exam: Physical Exam  Vitals reviewed     Constitutional:       General: He is not in acute distress  Appearance: Normal appearance  He is well-developed  Comments: On 3 liters/minutes nasal cannula oxygen O2 saturation is 93%   HENT:      Head: Normocephalic  Right Ear: External ear normal       Left Ear: External ear normal       Nose: Nose normal       Mouth/Throat:      Pharynx: No oropharyngeal exudate  Eyes:      Conjunctiva/sclera: Conjunctivae normal       Pupils: Pupils are equal, round, and reactive to light  Neck:      Vascular: No JVD  Trachea: No tracheal deviation  Cardiovascular:      Rate and Rhythm: Normal rate and regular rhythm  Heart sounds: Normal heart sounds  Pulmonary:      Effort: Pulmonary effort is normal       Comments: Lung sounds reveal some expiratory wheezes in both lower lobes  Abdominal:      General: There is no distension  Palpations: Abdomen is soft  Tenderness: There is no abdominal tenderness  There is no guarding  Musculoskeletal:      Cervical back: Neck supple  Comments: No edema   Lymphadenopathy:      Cervical: No cervical adenopathy  Skin:     General: Skin is warm and dry  Findings: No rash  Neurological:      General: No focal deficit present  Mental Status: He is alert and oriented to person, place, and time  Psychiatric:         Behavior: Behavior normal          Thought Content: Thought content normal           Labs: I have personally reviewed pertinent lab results  , ABG:   Lab Results   Component Value Date    PHART 7 430 04/28/2022    VVF9XFF 41 4 04/28/2022    PO2ART 171 2 (H) 04/28/2022    XDN6UPC 26 9 04/28/2022    BEART 2 3 04/28/2022    SOURCE Radial, Right 04/28/2022   , BNP: No results found for: BNP, CBC:   Lab Results   Component Value Date    WBC 6 84 07/05/2022    HGB 11 1 (L) 07/05/2022    HCT 33 0 (L) 07/05/2022    MCV 96 07/05/2022     (L) 07/05/2022    MCH 32 4 07/05/2022    MCHC 33 6 07/05/2022    RDW 16 4 (H) 07/05/2022    MPV 9 0 07/05/2022    NRBC 0 07/05/2022   , CMP:   Lab Results   Component Value Date     09/27/2016    K 4 5 07/05/2022    K 4 2 09/27/2016     07/05/2022     09/27/2016    CO2 27 07/05/2022    CO2 23 09/27/2016    BUN 25 07/05/2022    BUN 14 09/27/2016    CREATININE 1 19 07/05/2022    CREATININE 1 09 09/27/2016    GLUCOSE 89 09/27/2016    CALCIUM 8 6 07/05/2022    CALCIUM 9 3 09/27/2016    AST 27 07/03/2022    AST 18 09/27/2016    ALT 24 07/03/2022    ALT 16 09/27/2016    ALKPHOS 76 07/03/2022    ALKPHOS 57 09/27/2016    PROT 6 9 09/27/2016    BILITOT 1 0 09/27/2016    EGFR 58 07/05/2022   , PT/INR:   Lab Results   Component Value Date    INR 1 10 04/28/2022   , Troponin: No results found for: TROPONIN    Imaging and other studies: I have personally reviewed pertinent reports     and I have personally reviewed pertinent films in PACS

## 2022-07-07 NOTE — PLAN OF CARE
Problem: Potential for Falls  Goal: Patient will remain free of falls  Description: INTERVENTIONS:  - Educate patient/family on patient safety including physical limitations  - Instruct patient to call for assistance with activity   - Consult OT/PT to assist with strengthening/mobility   - Keep Call bell within reach  - Keep bed low and locked with side rails adjusted as appropriate  - Keep care items and personal belongings within reach  - Initiate and maintain comfort rounds  - Apply yellow socks and bracelet for high fall risk patients  - Consider moving patient to room near nurses station  Outcome: Progressing     Problem: Prexisting or High Potential for Compromised Skin Integrity  Goal: Skin integrity is maintained or improved  Description: INTERVENTIONS:  - Identify patients at risk for skin breakdown  - Assess and monitor skin integrity  - Assess and monitor nutrition and hydration status  - Monitor labs   - Assess for incontinence   - Turn and reposition patient  - Assist with mobility/ambulation  - Relieve pressure over bony prominences  - Avoid friction and shearing  - Provide appropriate hygiene as needed including keeping skin clean and dry  - Evaluate need for skin moisturizer/barrier cream  - Collaborate with interdisciplinary team   - Patient/family teaching  - Consider wound care consult   Outcome: Progressing     Problem: MOBILITY - ADULT  Goal: Maintain or return to baseline ADL function  Description: INTERVENTIONS:  -  Assess patient's ability to carry out ADLs; assess patient's baseline for ADL function and identify physical deficits which impact ability to perform ADLs (bathing, care of mouth/teeth, toileting, grooming, dressing, etc )  - Assess/evaluate cause of self-care deficits   - Assess range of motion  - Assess patient's mobility; develop plan if impaired  - Assess patient's need for assistive devices and provide as appropriate  - Encourage maximum independence but intervene and supervise when necessary  - Involve family in performance of ADLs  - Assess for home care needs following discharge   - Consider OT consult to assist with ADL evaluation and planning for discharge  - Provide patient education as appropriate  Outcome: Progressing  Goal: Maintains/Returns to pre admission functional level  Description: INTERVENTIONS:  - Perform BMAT or MOVE assessment daily    - Set and communicate daily mobility goal to care team and patient/family/caregiver     - Collaborate with rehabilitation services on mobility goals if consulted  - Out of bed for toileting  - Record patient progress and toleration of activity level   Outcome: Progressing     Problem: RESPIRATORY - ADULT  Goal: Achieves optimal ventilation and oxygenation  Description: INTERVENTIONS:  - Assess for changes in respiratory status  - Assess for changes in mentation and behavior  - Position to facilitate oxygenation and minimize respiratory effort  - Oxygen administered by appropriate delivery if ordered  - Initiate smoking cessation education as indicated  - Encourage broncho-pulmonary hygiene including cough, deep breathe, Incentive Spirometry  - Assess the need for suctioning and aspirate as needed  - Assess and instruct to report SOB or any respiratory difficulty  - Respiratory Therapy support as indicated  Outcome: Progressing

## 2022-07-07 NOTE — PROGRESS NOTES
[facility] Practice Progress Note - Alison Henry 65 y o  male MRN: [id number]    Unit/Bed#: [address] [place] [address] Encounter: [id number]      Assessment/Plan:  * COPD with acute exacerbation [facility])  Assessment & Plan  Patient with history of AAT deficiency and centrilobular emphysema presents with worsening sob over the last couple days with no relief of symptoms with use of nebulizer at home  He is on home oxygen baseline 2-3 L  ED: IV solumedrol 125mg x1    · Pulmonology Consult - continued recommendations appreciated  · Continue DuoNebs  · Continue Budesonide nebulizer BID  · Added Perforomist nebulizer BID  · Continue Solu-Medrol 40 mg BID  · Incentive spirometry  · Outpatient pulm follow up with PFTs & DLCO  · No need for anticoagulation for old unchanged PE in CTA   · When patient is discharged would consider longer taper prednisone and can do prednisone 40 mg for 4 days with 10 mg taper every 5th day  After 10 mg dose is completed then can keep on 5 mg dose for 1 week to see if there is any benefit  · Continue pulse ox  · Keep SpO2 above 92%    Parkinson's disease (HCC)  Assessment & Plan  Continue sinemet 25-100mg TID    Anxiety  Assessment & Plan  Continue buspar 10mg TID    Depression, recurrent (HCC)  Assessment & Plan  Continue remeron 30mg QHS    Peripheral neuropathy  Assessment & Plan  Continue gabapentin 200mg BID    BPH (benign prostatic hyperplasia)  Assessment & Plan  Continue home flomax 0 4mg daily and finasteride 5mg daily    Essential hypertension  Assessment & Plan  BP elevated with -190 on admission, missed evening dose of antihypertensives  · Received IV hydralazine 5mg x1  · Continue home norvasc 10mg daily and metoprolol 25mg BID  · Continue to monitor    Alpha-1-antitrypsin deficiency [facility])  Assessment & Plan  Receives weekly Zemaira infusion    Will confirm last dose and order for weekly dose accordingly    CKD (chronic kidney disease) stage 2, GFR 60-89 ml/min-resolved as of 7/5/2022  Assessment & Plan  Lab Results   Component Value Date    EGFR 58 07/05/2022    EGFR 48 07/03/2022    EGFR 63 06/09/2022    CREATININE 1 19 07/05/2022    CREATININE 1 39 (H) 07/03/2022    CREATININE 1 11 06/09/2022   BL Cr 1 1-1 2  Continue to monitor          Subjective:   Patient resting comfortably in bed upon examination on 3 L of oxygen which is his baseline at home  He reports feeling about the same as yesterday and the day before  He reports continued shortness of breath with exertion which he says is worse than his baseline  He also says he feels dizzy when he sits up  He denies any headache, chest pain, abdominal pain, nausea, vomiting, or diarrhea  Objective:     Vitals: Blood pressure 147/72, pulse 60, temperature 98 1 °F (36 7 °C), temperature source Oral, resp  rate 20, height 6' 5" (1 956 m), weight 83 5 kg (184 lb), SpO2 96 %  ,Body mass index is 21 82 kg/m²  Wt Readings from Last 3 Encounters:   07/06/22 83 5 kg (184 lb)   06/22/22 83 2 kg (183 lb 8 oz)   06/20/22 83 2 kg (183 lb 8 oz)       Intake/Output Summary (Last 24 hours) at 7/7/2022 0748  Last data filed at 7/6/2022 2301  Gross per 24 hour   Intake 250 ml   Output 420 ml   Net -170 ml       Physical Exam:  Physical Exam  Constitutional:       General: He is not in acute distress  Appearance: He is normal weight  He is not ill-appearing, toxic-appearing or diaphoretic  HENT:      Head: Normocephalic  Mouth/Throat:      Mouth: Mucous membranes are moist    Cardiovascular:      Rate and Rhythm: Normal rate and regular rhythm  Pulses: Normal pulses  Heart sounds: Normal heart sounds  Pulmonary:      Effort: Pulmonary effort is normal  No respiratory distress  Breath sounds: Normal breath sounds  No stridor  No wheezing, rhonchi or rales  Chest:      Chest wall: No tenderness  Abdominal:      General: Bowel sounds are normal       Tenderness: There is no abdominal tenderness     Skin:     Capillary Refill: Capillary refill takes less than 2 seconds  Neurological:      Mental Status: He is alert and oriented to person, place, and time             Recent Results (from the past 24 hour(s))   CBC    Collection Time: 07/07/22  5:47 AM   Result Value Ref Range    WBC 7 72 4 31 - 10 16 Thousand/uL    RBC 3 57 (L) 3 88 - 5 62 Million/uL    Hemoglobin 11 5 (L) 12 0 - 17 0 g/dL    Hematocrit 34 5 (L) 36 5 - 49 3 %    MCV 97 82 - 98 fL    MCH 32 2 26 8 - 34 3 pg    MCHC 33 3 31 4 - 37 4 g/dL    RDW 15 8 (H) 11 6 - 15 1 %    Platelets 352 002 - 684 Thousands/uL    MPV 9 6 8 9 - 12 7 fL   Basic metabolic panel    Collection Time: 07/07/22  5:47 AM   Result Value Ref Range    Sodium 138 136 - 145 mmol/L    Potassium 4 6 3 5 - 5 3 mmol/L    Chloride 103 100 - 108 mmol/L    CO2 25 21 - 32 mmol/L    ANION GAP 10 4 - 13 mmol/L    BUN 33 (H) 5 - 25 mg/dL    Creatinine 0 93 0 60 - 1 30 mg/dL    Glucose 132 65 - 140 mg/dL    Calcium 8 7 8 3 - 10 1 mg/dL    eGFR 78 ml/min/1 73sq m       Current Facility-Administered Medications   Medication Dose Route Frequency Provider Last Rate Last Admin    amLODIPine (NORVASC) tablet 10 mg  10 mg Oral Daily Louise Guzman, DO   10 mg at 07/06/22 0845    budesonide (PULMICORT) inhalation solution 0 5 mg  0 5 mg Nebulization BID Louise Guzman, DO   0 5 mg at 07/07/22 5361    busPIRone (BUSPAR) tablet 10 mg  10 mg Oral TID Trevon Gonzalez, DO   10 mg at 07/06/22 2125    carbidopa-levodopa (SINEMET)  mg per tablet 1 tablet  1 tablet Oral TID Trevon Gonzalez, DO   1 tablet at 07/06/22 2125    cholestyramine sugar free (QUESTRAN LIGHT) packet 4 g  4 g Oral Daily Louise Guzman, DO   4 g at 07/05/22 0930    enoxaparin (LOVENOX) subcutaneous injection 40 mg  40 mg Subcutaneous Daily Louise Guzman, DO   40 mg at 07/06/22 0846    finasteride (PROSCAR) tablet 5 mg  5 mg Oral ELIEM Louise Guzman DO   5 mg at 07/06/22 0845    fluticasone (FLONASE) 50 mcg/act nasal spray 2 spray  2 spray Nasal Daily Gearldean Slim, DO   2 spray at 07/04/22 0903    formoterol (PERFOROMIST) nebulizer solution 20 mcg  20 mcg Nebulization Q12H Nataliya Serrano MD   20 mcg at 07/07/22 0745    gabapentin (NEURONTIN) capsule 200 mg  200 mg Oral BID Louise Guzman, DO   200 mg at 07/06/22 1750    ipratropium-albuterol (DUO-NEB) 0 5-2 5 mg/3 mL inhalation solution 3 mL  3 mL Nebulization Q6H Louise Guzman, DO   3 mL at 07/07/22 0721    ipratropium-albuterol (DUO-NEB) 0 5-2 5 mg/3 mL inhalation solution 3 mL  3 mL Nebulization Q4H PRN Louise Guzman, DO   3 mL at 07/04/22 1155    ipratropium-albuterol (DUO-NEB) 0 5-2 5 mg/3 mL inhalation solution 3 mL  3 mL Nebulization Once Kim Alexandra MD        methylPREDNISolone sodium succinate (Solu-MEDROL) injection 40 mg  40 mg Intravenous Q8H Akosua See MD   40 mg at 07/07/22 0059    metoprolol tartrate (LOPRESSOR) tablet 25 mg  25 mg Oral Q12H [address] [place] [address], DO   25 mg at 07/06/22 2125    mirtazapine (REMERON) tablet 30 mg  30 mg Oral HS Raveena Gandhi, DO   30 mg at 07/06/22 2125    pantoprazole (PROTONIX) EC tablet 40 mg  40 mg Oral BID Raveena Gandhi, DO   40 mg at 07/06/22 1750    tamsulosin (FLOMAX) capsule 0 4 mg  0 4 mg Oral Daily Raveena Gandhi, DO   0 4 mg at 07/06/22 0845       Invasive Devices  Report    Peripheral Intravenous Line  Duration           Peripheral IV 07/03/22 Left Arm 3 days                Lab, Imaging and other studies: I have personally reviewed pertinent reports      VTE Pharmacologic Prophylaxis: Enoxaparin (Lovenox)  VTE Mechanical Prophylaxis: sequential compression device    Akosua See MD

## 2022-07-07 NOTE — CASE MANAGEMENT
Case Management Progress Note    Patient name Kathia Sweet  Location 3 [facility] [address]-* MRN [id number]  : 1945 Date 2022       LOS (days): 3  Geometric Mean LOS (GMLOS) (days): 3 60  Days to GMLOS:0 8        OBJECTIVE:     Current admission status: Inpatient  Preferred Pharmacy:   [address] [place] [address] [address] [address]  Phone: [phone] Fax: [phone]    [address] [facility] [address]  [address] [facility] [address]  Phone: [phone] Fax: [phone]    [address], [address] [facility]  [address] [facility]  [address] [facility] [address]  Phone: [phone] Fax: [phone]    Primary Care Provider: Radames Bales MD    Primary Insurance: MEDICARE  Secondary Insurance: [employer] [place]    PROGRESS NOTE:    SW confirmed with [facility] that pt is open for SN services

## 2022-07-08 LAB
ANION GAP SERPL CALCULATED.3IONS-SCNC: 10 MMOL/L (ref 4–13)
BUN SERPL-MCNC: 34 MG/DL (ref 5–25)
CALCIUM SERPL-MCNC: 8.6 MG/DL (ref 8.3–10.1)
CHLORIDE SERPL-SCNC: 105 MMOL/L (ref 100–108)
CO2 SERPL-SCNC: 26 MMOL/L (ref 21–32)
CREAT SERPL-MCNC: 0.91 MG/DL (ref 0.6–1.3)
ERYTHROCYTE [DISTWIDTH] IN BLOOD BY AUTOMATED COUNT: 15.8 % (ref 11.6–15.1)
GFR SERPL CREATININE-BSD FRML MDRD: 80 ML/MIN/1.73SQ M
GLUCOSE SERPL-MCNC: 109 MG/DL (ref 65–140)
HCT VFR BLD AUTO: 34.4 % (ref 36.5–49.3)
HGB BLD-MCNC: 11.8 G/DL (ref 12–17)
MCH RBC QN AUTO: 33 PG (ref 26.8–34.3)
MCHC RBC AUTO-ENTMCNC: 34.3 G/DL (ref 31.4–37.4)
MCV RBC AUTO: 96 FL (ref 82–98)
PLATELET # BLD AUTO: 143 THOUSANDS/UL (ref 149–390)
PMV BLD AUTO: 9.4 FL (ref 8.9–12.7)
POTASSIUM SERPL-SCNC: 4.2 MMOL/L (ref 3.5–5.3)
RBC # BLD AUTO: 3.58 MILLION/UL (ref 3.88–5.62)
SODIUM SERPL-SCNC: 141 MMOL/L (ref 136–145)
WBC # BLD AUTO: 10.02 THOUSAND/UL (ref 4.31–10.16)

## 2022-07-08 PROCEDURE — 99232 SBSQ HOSP IP/OBS MODERATE 35: CPT | Performed by: FAMILY MEDICINE

## 2022-07-08 PROCEDURE — 94760 N-INVAS EAR/PLS OXIMETRY 1: CPT

## 2022-07-08 PROCEDURE — 85027 COMPLETE CBC AUTOMATED: CPT | Performed by: STUDENT IN AN ORGANIZED HEALTH CARE EDUCATION/TRAINING PROGRAM

## 2022-07-08 PROCEDURE — 99232 SBSQ HOSP IP/OBS MODERATE 35: CPT | Performed by: INTERNAL MEDICINE

## 2022-07-08 PROCEDURE — 94640 AIRWAY INHALATION TREATMENT: CPT

## 2022-07-08 PROCEDURE — 97110 THERAPEUTIC EXERCISES: CPT

## 2022-07-08 PROCEDURE — 80048 BASIC METABOLIC PNL TOTAL CA: CPT | Performed by: STUDENT IN AN ORGANIZED HEALTH CARE EDUCATION/TRAINING PROGRAM

## 2022-07-08 RX ADMIN — METHYLPREDNISOLONE SODIUM SUCCINATE 40 MG: 40 INJECTION, POWDER, FOR SOLUTION INTRAMUSCULAR; INTRAVENOUS at 20:39

## 2022-07-08 RX ADMIN — IPRATROPIUM BROMIDE AND ALBUTEROL SULFATE 3 ML: 2.5; .5 SOLUTION RESPIRATORY (INHALATION) at 13:15

## 2022-07-08 RX ADMIN — IPRATROPIUM BROMIDE AND ALBUTEROL SULFATE 3 ML: 2.5; .5 SOLUTION RESPIRATORY (INHALATION) at 02:46

## 2022-07-08 RX ADMIN — FORMOTEROL FUMARATE DIHYDRATE 20 MCG: 20 SOLUTION RESPIRATORY (INHALATION) at 20:38

## 2022-07-08 RX ADMIN — FORMOTEROL FUMARATE DIHYDRATE 20 MCG: 20 SOLUTION RESPIRATORY (INHALATION) at 07:29

## 2022-07-08 RX ADMIN — ENOXAPARIN SODIUM 40 MG: 40 INJECTION SUBCUTANEOUS at 09:18

## 2022-07-08 RX ADMIN — PANTOPRAZOLE SODIUM 40 MG: 40 TABLET, DELAYED RELEASE ORAL at 09:18

## 2022-07-08 RX ADMIN — PANTOPRAZOLE SODIUM 40 MG: 40 TABLET, DELAYED RELEASE ORAL at 17:04

## 2022-07-08 RX ADMIN — METOPROLOL TARTRATE 25 MG: 25 TABLET, FILM COATED ORAL at 09:18

## 2022-07-08 RX ADMIN — CARBIDOPA AND LEVODOPA 1 TABLET: 25; 100 TABLET ORAL at 20:39

## 2022-07-08 RX ADMIN — BUSPIRONE HYDROCHLORIDE 10 MG: 10 TABLET ORAL at 09:18

## 2022-07-08 RX ADMIN — BUSPIRONE HYDROCHLORIDE 10 MG: 10 TABLET ORAL at 16:07

## 2022-07-08 RX ADMIN — IPRATROPIUM BROMIDE AND ALBUTEROL SULFATE 3 ML: 2.5; .5 SOLUTION RESPIRATORY (INHALATION) at 20:53

## 2022-07-08 RX ADMIN — GABAPENTIN 200 MG: 100 CAPSULE ORAL at 09:18

## 2022-07-08 RX ADMIN — METOPROLOL TARTRATE 25 MG: 25 TABLET, FILM COATED ORAL at 20:39

## 2022-07-08 RX ADMIN — IPRATROPIUM BROMIDE AND ALBUTEROL SULFATE 3 ML: 2.5; .5 SOLUTION RESPIRATORY (INHALATION) at 07:29

## 2022-07-08 RX ADMIN — FINASTERIDE 5 MG: 5 TABLET, FILM COATED ORAL at 09:22

## 2022-07-08 RX ADMIN — TAMSULOSIN HYDROCHLORIDE 0.4 MG: 0.4 CAPSULE ORAL at 09:18

## 2022-07-08 RX ADMIN — CARBIDOPA AND LEVODOPA 1 TABLET: 25; 100 TABLET ORAL at 16:07

## 2022-07-08 RX ADMIN — AMLODIPINE BESYLATE 10 MG: 10 TABLET ORAL at 09:18

## 2022-07-08 RX ADMIN — CARBIDOPA AND LEVODOPA 1 TABLET: 25; 100 TABLET ORAL at 09:18

## 2022-07-08 RX ADMIN — GABAPENTIN 200 MG: 100 CAPSULE ORAL at 17:04

## 2022-07-08 RX ADMIN — BUDESONIDE 0.5 MG: 0.5 INHALANT ORAL at 07:29

## 2022-07-08 RX ADMIN — METHYLPREDNISOLONE SODIUM SUCCINATE 40 MG: 40 INJECTION, POWDER, FOR SOLUTION INTRAMUSCULAR; INTRAVENOUS at 09:18

## 2022-07-08 RX ADMIN — BUSPIRONE HYDROCHLORIDE 10 MG: 10 TABLET ORAL at 20:38

## 2022-07-08 RX ADMIN — BUDESONIDE 0.5 MG: 0.5 INHALANT ORAL at 20:55

## 2022-07-08 RX ADMIN — MIRTAZAPINE 30 MG: 15 TABLET, FILM COATED ORAL at 21:51

## 2022-07-08 NOTE — PROGRESS NOTES
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 into posterior aspect left lower lobe  No change compared to November 2021  Chronic hypoxemic respiratory failure  He is on usual does have oxygen 3 liters/minute  Plan:  Continue Solu-Medrol 40 mg IV every 12 hours when patient goes home with discharge on prednisone 40 mg daily because frequent exacerbations with do slow taper  Was started 40 mg daily with taper 10 mg every week and use saline maintenance dose of 10 mg daily until he is re-evaluated in our office  Neb treatments with DuoNeb q i d  And on patient does use Perforomist 20 mcg b i d  budesonide 0 5 mg b i d  He does have nebulizer DuoNeb solution at home as well  Subjective:   No change  Not short of breath at rest but does get short of breath just walking 10 ft to the bathroom  Objective:     Vitals: Blood pressure 137/65, pulse 68, temperature 98 8 °F (37 1 °C), temperature source Oral, resp  rate 18, height 6' 5" (1 956 m), weight 83 5 kg (184 lb), SpO2 96 %  ,Body mass index is 21 82 kg/m²  Intake/Output Summary (Last 24 hours) at 7/7/2022 2209  Last data filed at 7/6/2022 2301  Gross per 24 hour   Intake 250 ml   Output 420 ml   Net -170 ml       Physical Exam: Physical Exam  Vitals reviewed  Constitutional:       General: He is not in acute distress  Appearance: He is well-developed  Comments: On 3 liters/minute nasal cannula oxygen was 96%   HENT:      Head: Normocephalic        Right Ear: External ear normal       Left Ear: External ear normal       Nose: Nose normal       Mouth/Throat:      Mouth: Mucous membranes are moist       Pharynx: Oropharynx is clear  No oropharyngeal exudate  Eyes:      Conjunctiva/sclera: Conjunctivae normal       Pupils: Pupils are equal, round, and reactive to light  Neck:      Vascular: No JVD  Trachea: No tracheal deviation  Cardiovascular:      Rate and Rhythm: Normal rate and regular rhythm  Heart sounds: Normal heart sounds  Pulmonary:      Effort: Pulmonary effort is normal       Comments: Few faint expiratory wheezes right lower lobe otherwise clear  Abdominal:      General: There is no distension  Palpations: Abdomen is soft  Tenderness: There is no abdominal tenderness  There is no guarding  Musculoskeletal:      Cervical back: Neck supple  Comments: No edema   Lymphadenopathy:      Cervical: No cervical adenopathy  Skin:     General: Skin is warm and dry  Findings: No rash  Neurological:      General: No focal deficit present  Mental Status: He is alert and oriented to person, place, and time  Psychiatric:         Behavior: Behavior normal          Thought Content: Thought content normal           Labs: I have personally reviewed pertinent lab results  , ABG:   Lab Results   Component Value Date    PHART 7 430 04/28/2022    [id number] 41 4 04/28/2022    PO2ART 171 2 (H) 04/28/2022    ISR1GFV 26 9 04/28/2022    BEART 2 3 04/28/2022    SOURCE Radial, Right 04/28/2022   , BNP: No results found for: BNP, CBC:   Lab Results   Component Value Date    WBC 7 72 07/07/2022    HGB 11 5 (L) 07/07/2022    HCT 34 5 (L) 07/07/2022    MCV 97 07/07/2022     07/07/2022    MCH 32 2 07/07/2022    MCHC 33 3 07/07/2022    RDW 15 8 (H) 07/07/2022    MPV 9 6 07/07/2022    NRBC 0 07/05/2022   , CMP:   Lab Results   Component Value Date     09/27/2016    K 4 6 07/07/2022    K 4 2 09/27/2016     07/07/2022     09/27/2016    CO2 25 07/07/2022    CO2 23 09/27/2016    BUN 33 (H) 07/07/2022    BUN 14 09/27/2016    CREATININE 0 93 07/07/2022    CREATININE 1 09 09/27/2016    GLUCOSE 89 09/27/2016    CALCIUM 8 7 07/07/2022    CALCIUM 9 3 09/27/2016    AST 27 07/03/2022    AST 18 09/27/2016    ALT 24 07/03/2022    ALT 16 09/27/2016    ALKPHOS 76 07/03/2022    ALKPHOS 57 09/27/2016    PROT 6 9 09/27/2016    BILITOT 1 0 09/27/2016    EGFR 78 07/07/2022   , PT/INR:   Lab Results   Component Value Date    INR 1 10 04/28/2022   , Troponin: No results found for: TROPONIN    Imaging and other studies: I have personally reviewed pertinent reports     and I have personally reviewed pertinent films in PACS

## 2022-07-08 NOTE — PHYSICAL THERAPY NOTE
PT TREATMENT     07/08/22 1110   Note Type   Note Type Treatment   Pain Assessment   Pain Assessment Tool 0-10   Pain Score No Pain   Restrictions/Precautions   Other Precautions Chair Alarm; Bed Alarm; Fall Risk;O2   General   Chart Reviewed Yes   Family/Caregiver Present No   Cognition   Arousal/Participation Cooperative   Subjective   Subjective patient states feeling tired from not sleeping   Bed Mobility   Additional Comments patient deferred out of bed activity at this time, agreeable to bedside exercise   Exercises   Hamstring Stretch Supine;5 reps;PROM; Bilateral   Quad Sets Supine;10 reps   Heelslides Supine;10 reps;Bilateral   Glute Sets Supine;10 reps   Hip Abduction Supine;10 reps;Bilateral   Knee AROM Short Arc Quad Supine;10 reps;Bilateral   Ankle Pumps Supine;20 reps;Bilateral   Bridging Supine;5 reps   Assessment   Assessment Patient cooperative and motivated requiring frequent rest periods with activity due to shortness of breath  Patient will benefit from continued physical therapy with progression as tolerated  The patient's AM-PAC Basic Mobility Inpatient Short Form Raw Score is 17  A Raw score of greater than 16 suggests the patient may benefit from discharge to home  Please also refer to the recommendation of the Physical Therapist for safe discharge planning  Plan   Treatment/Interventions ADL retraining;Functional transfer training;LE strengthening/ROM; Therapeutic exercise; Endurance training;Patient/family training;Equipment eval/education; Bed mobility;Gait training; Compensatory technique education   PT Frequency Other (Comment)  (5 times per week)   Recommendation   PT Discharge Recommendation Home with home health rehabilitation   Lore Collins 435   Turning in Bed Without Bedrails 3   Lying on Back to Sitting on Edge of Flat Bed 3   Moving Bed to Chair 3   Standing Up From Chair 3   Walk in Room 3   Climb 3-5 Stairs 2   Basic Mobility Inpatient Raw Score 17   Basic Mobility Standardized Score 39 67   Highest Level Of Mobility   -HLM Goal 5: Stand one or more mins   -HLM Achieved 2: Bed activities/Dependent transfer   Education   Patient Reinforcement needed; Explanation/teachback used   [employer] License Number  Araceli Aguirre  [id number]

## 2022-07-08 NOTE — PROGRESS NOTES
[facility] Practice Progress Note - Marshell Dakin 65 y o  male MRN: [id number]    Unit/Bed#: [address] [place] [address] [id number] Encounter: [id number]      Assessment/Plan:  * COPD with acute exacerbation [facility])  Assessment & Plan  Patient with history of AAT deficiency and centrilobular emphysema presents with worsening sob over the last couple days with no relief of symptoms with use of nebulizer at home  He is on home oxygen baseline 2-3 L  ED: IV solumedrol 125mg x1    · Pulmonology Consult - continued recommendations appreciated  · Continue DuoNebs  · Continue Budesonide nebulizer BID  · Added Perforomist nebulizer BID  · Continue Solu-Medrol 40 mg BID  · Incentive spirometry  · Outpatient pulm follow up with PFTs & DLCO  · No need for anticoagulation for old unchanged PE in CTA   · When patient is discharged would consider longer taper prednisone and can do prednisone 40 mg for 4 days with 10 mg taper every 5th day  After 10 mg dose is completed then can keep on 5 mg dose for 1 week to see if there is any benefit  · Continue pulse ox  · Keep SpO2 above 92%    Parkinson's disease (HCC)  Assessment & Plan  Continue sinemet 25-100mg TID    Anxiety  Assessment & Plan  Continue buspar 10mg TID    Depression, recurrent (HCC)  Assessment & Plan  Continue remeron 30mg QHS    Peripheral neuropathy  Assessment & Plan  Continue gabapentin 200mg BID    BPH (benign prostatic hyperplasia)  Assessment & Plan  Continue home flomax 0 4mg daily and finasteride 5mg daily    Essential hypertension  Assessment & Plan  BP elevated with -190 on admission, missed evening dose of antihypertensives  · Received IV hydralazine 5mg x1  · Continue home norvasc 10mg daily and metoprolol 25mg BID  · Continue to monitor    Alpha-1-antitrypsin deficiency [facility])  Assessment & Plan  Receives weekly Zemaira infusion    Will confirm last dose and order for weekly dose accordingly    CKD (chronic kidney disease) stage 2, GFR 60-89 ml/min-resolved as of 7/5/2022  Assessment & Plan  Lab Results   Component Value Date    EGFR 58 07/05/2022    EGFR 48 07/03/2022    EGFR 63 06/09/2022    CREATININE 1 19 07/05/2022    CREATININE 1 39 (H) 07/03/2022    CREATININE 1 11 06/09/2022   BL Cr 1 1-1 2  Continue to monitor      Subjective:   Patient sitting up on edge of bed upon examination on 3 L of oxygen which is his baseline  He continues report dyspnea with exertion  He denies any fever, chills, chest pain, nausea, vomiting, or diarrhea  Objective:     Vitals: Blood pressure 159/78, pulse 62, temperature 97 8 °F (36 6 °C), temperature source Oral, resp  rate 18, height 6' 5" (1 956 m), weight 84 kg (185 lb 3 2 oz), SpO2 96 %  ,Body mass index is 21 96 kg/m²  Wt Readings from Last 3 Encounters:   07/08/22 84 kg (185 lb 3 2 oz)   06/22/22 83 2 kg (183 lb 8 oz)   06/20/22 83 2 kg (183 lb 8 oz)       Intake/Output Summary (Last 24 hours) at 7/8/2022 1903  Last data filed at 7/8/2022 0600  Gross per 24 hour   Intake --   Output 600 ml   Net -600 ml       Physical Exam:   Physical Exam  Constitutional:       General: He is not in acute distress  Appearance: He is normal weight  HENT:      Head: Normocephalic  Mouth/Throat:      Mouth: Mucous membranes are moist    Cardiovascular:      Rate and Rhythm: Normal rate and regular rhythm  Pulses: Normal pulses  Heart sounds: Normal heart sounds  Pulmonary:      Effort: Pulmonary effort is normal  No respiratory distress  Breath sounds: Normal breath sounds  No stridor  No wheezing, rhonchi or rales  Chest:      Chest wall: No tenderness  Abdominal:      General: Bowel sounds are normal       Tenderness: There is no abdominal tenderness  Skin:     Capillary Refill: Capillary refill takes less than 2 seconds  Neurological:      Mental Status: He is alert and oriented to person, place, and time             Recent Results (from the past 24 hour(s))   CBC    Collection Time: 07/08/22  6:50 AM Result Value Ref Range    WBC 10 02 4 31 - 10 16 Thousand/uL    RBC 3 58 (L) 3 88 - 5 62 Million/uL    Hemoglobin 11 8 (L) 12 0 - 17 0 g/dL    Hematocrit 34 4 (L) 36 5 - 49 3 %    MCV 96 82 - 98 fL    MCH 33 0 26 8 - 34 3 pg    MCHC 34 3 31 4 - 37 4 g/dL    RDW 15 8 (H) 11 6 - 15 1 %    Platelets 792 (L) 783 - 390 Thousands/uL    MPV 9 4 8 9 - 12 7 fL   Basic metabolic panel    Collection Time: 07/08/22  6:50 AM   Result Value Ref Range    Sodium 141 136 - 145 mmol/L    Potassium 4 2 3 5 - 5 3 mmol/L    Chloride 105 100 - 108 mmol/L    CO2 26 21 - 32 mmol/L    ANION GAP 10 4 - 13 mmol/L    BUN 34 (H) 5 - 25 mg/dL    Creatinine 0 91 0 60 - 1 30 mg/dL    Glucose 109 65 - 140 mg/dL    Calcium 8 6 8 3 - 10 1 mg/dL    eGFR 80 ml/min/1 73sq m       Current Facility-Administered Medications   Medication Dose Route Frequency Provider Last Rate Last Admin    amLODIPine (NORVASC) tablet 10 mg  10 mg Oral Daily Louise Guzman, DO   10 mg at 07/07/22 0815    budesonide (PULMICORT) inhalation solution 0 5 mg  0 5 mg Nebulization BID Raveena Gandhi, DO   0 5 mg at 07/08/22 0729    busPIRone (BUSPAR) tablet 10 mg  10 mg Oral TID Richad Fees, DO   10 mg at 07/07/22 2144    carbidopa-levodopa (SINEMET)  mg per tablet 1 tablet  1 tablet Oral TID Richad Fees, DO   1 tablet at 07/07/22 2144    cholestyramine sugar free (QUESTRAN LIGHT) packet 4 g  4 g Oral Daily Louise Guzman, DO   4 g at 07/05/22 0930    enoxaparin (LOVENOX) subcutaneous injection 40 mg  40 mg Subcutaneous Daily Raveena Gandhi, DO   40 mg at 07/07/22 0815    finasteride (PROSCAR) tablet 5 mg  5 mg Oral QAM Louise Guzman, DO   5 mg at 07/07/22 0815    fluticasone (FLONASE) 50 mcg/act nasal spray 2 spray  2 spray Nasal Daily Louise Guzman,    2 spray at 07/04/22 0903    formoterol (PERFOROMIST) nebulizer solution 20 mcg  20 mcg Nebulization Q12H Glory Mcburney, MD   20 mcg at 07/08/22 0729    gabapentin (NEURONTIN) capsule 200 mg  200 mg Oral BID Louise Guzman, DO   200 mg at 07/07/22 1721    ipratropium-albuterol (DUO-NEB) 0 5-2 5 mg/3 mL inhalation solution 3 mL  3 mL Nebulization Q6H Louise Guzman, DO   3 mL at 07/08/22 0729    ipratropium-albuterol (DUO-NEB) 0 5-2 5 mg/3 mL inhalation solution 3 mL  3 mL Nebulization Q4H PRN Louise Guzman, DO   3 mL at 07/04/22 1155    ipratropium-albuterol (DUO-NEB) 0 5-2 5 mg/3 mL inhalation solution 3 mL  3 mL Nebulization Once Becca Holland MD        methylPREDNISolone sodium succinate (Solu-MEDROL) injection 40 mg  40 mg Intravenous Q12H [address] Nai Laura MD        metoprolol tartrate (LOPRESSOR) tablet 25 mg  25 mg Oral Q12H [facility], DO   25 mg at 07/07/22 2145    mirtazapine (REMERON) tablet 30 mg  30 mg Oral HS Louise Guzman, DO   30 mg at 07/07/22 2144    pantoprazole (PROTONIX) EC tablet 40 mg  40 mg Oral BID Louise Nicolei, DO   40 mg at 07/07/22 1721    tamsulosin (FLOMAX) capsule 0 4 mg  0 4 mg Oral Daily Louise Guzman, DO   0 4 mg at 07/07/22 0815       Invasive Devices  Report    Peripheral Intravenous Line  Duration           Peripheral IV 07/03/22 Left Arm 4 days    Peripheral IV 07/07/22 Dorsal (posterior); Right Forearm 1 day                Lab, Imaging and other studies: I have personally reviewed pertinent reports      VTE Pharmacologic Prophylaxis: Enoxaparin (Lovenox)  VTE Mechanical Prophylaxis: sequential compression device    Nai Laura MD

## 2022-07-08 NOTE — PLAN OF CARE
Problem: Potential for Falls  Goal: Patient will remain free of falls  Description: INTERVENTIONS:  - Educate patient/family on patient safety including physical limitations  - Instruct patient to call for assistance with activity   - Consult OT/PT to assist with strengthening/mobility   - Keep Call bell within reach  - Keep bed low and locked with side rails adjusted as appropriate  - Keep care items and personal belongings within reach  - Initiate and maintain comfort rounds  - Make Fall Risk Sign visible to staff  - Offer Toileting every 2 Hours, in advance of need  - Initiate/Maintain bed alarm    Problem: RESPIRATORY - ADULT  Goal: Achieves optimal ventilation and oxygenation  Description: INTERVENTIONS:  - Assess for changes in respiratory status  - Assess for changes in mentation and behavior  - Position to facilitate oxygenation and minimize respiratory effort  - Oxygen administered by appropriate delivery if ordered  - Initiate smoking cessation education as indicated  - Encourage broncho-pulmonary hygiene including cough, deep breathe, Incentive Spirometry  - Assess the need for suctioning and aspirate as needed  - Assess and instruct to report SOB or any respiratory difficulty  - Respiratory Therapy support as indicated  Outcome: Progressing   - Apply yellow socks and bracelet for high fall risk patients  - Consider moving patient to room near nurses station  Outcome: Progressing     Problem: Prexisting or High Potential for Compromised Skin Integrity  Goal: Skin integrity is maintained or improved  Description: INTERVENTIONS:  - Identify patients at risk for skin breakdown  - Assess and monitor skin integrity  - Assess and monitor nutrition and hydration status  - Monitor labs   - Assess for incontinence   - Turn and reposition patient  - Assist with mobility/ambulation  - Relieve pressure over bony prominences  - Avoid friction and shearing  - Provide appropriate hygiene as needed including keeping skin clean and dry  - Evaluate need for skin moisturizer/barrier cream  - Collaborate with interdisciplinary team   - Patient/family teaching  - Consider wound care consult   Outcome: Progressing

## 2022-07-08 NOTE — PLAN OF CARE
Problem: Potential for Falls  Goal: Patient will remain free of falls  Description: INTERVENTIONS:  - Educate patient/family on patient safety including physical limitations  - Instruct patient to call for assistance with activity   - Consult OT/PT to assist with strengthening/mobility   - Keep Call bell within reach  - Keep bed low and locked with side rails adjusted as appropriate  - Keep care items and personal belongings within reach  - Initiate and maintain comfort rounds  - Make Fall Risk Sign visible to staff  - Offer Toileting every  2 Hours, in advance of need  - Initiate/Maintain bed alarm  - Obtain necessary fall risk management equipment: Call bell  - Apply yellow socks and bracelet for high fall risk patients  - Consider moving patient to room near nurses station  Outcome: Progressing     Problem: Prexisting or High Potential for Compromised Skin Integrity  Goal: Skin integrity is maintained or improved  Description: INTERVENTIONS:  - Identify patients at risk for skin breakdown  - Assess and monitor skin integrity  - Assess and monitor nutrition and hydration status  - Monitor labs   - Assess for incontinence   - Turn and reposition patient  - Assist with mobility/ambulation  - Relieve pressure over bony prominences  - Avoid friction and shearing  - Provide appropriate hygiene as needed including keeping skin clean and dry  - Evaluate need for skin moisturizer/barrier cream  - Collaborate with interdisciplinary team   - Patient/family teaching  - Consider wound care consult   Outcome: Progressing     Problem: MOBILITY - ADULT  Goal: Maintain or return to baseline ADL function  Description: INTERVENTIONS:  -  Assess patient's ability to carry out ADLs; assess patient's baseline for ADL function and identify physical deficits which impact ability to perform ADLs (bathing, care of mouth/teeth, toileting, grooming, dressing, etc )  - Assess/evaluate cause of self-care deficits   - Assess range of motion  - Assess patient's mobility; develop plan if impaired  - Assess patient's need for assistive devices and provide as appropriate  - Encourage maximum independence but intervene and supervise when necessary  - Involve family in performance of ADLs  - Assess for home care needs following discharge   - Consider OT consult to assist with ADL evaluation and planning for discharge  - Provide patient education as appropriate  Outcome: Progressing  Goal: Maintains/Returns to pre admission functional level  Description: INTERVENTIONS:  - Perform BMAT or MOVE assessment daily    - Set and communicate daily mobility goal to care team and patient/family/caregiver  - Collaborate with rehabilitation services on mobility goals if consulted  - Perform Range of Motion x times a day  - Reposition patient every x hours    - Dangle patient x times a day  - Stand patient x times a day  - Ambulate patient x times a day  - Out of bed to chair x times a day   - Out of bed for meals x times a day  - Out of bed for toileting  - Record patient progress and toleration of activity level   Outcome: Progressing     Problem: RESPIRATORY - ADULT  Goal: Achieves optimal ventilation and oxygenation  Description: INTERVENTIONS:  - Assess for changes in respiratory status  - Assess for changes in mentation and behavior  - Position to facilitate oxygenation and minimize respiratory effort  - Oxygen administered by appropriate delivery if ordered  - Initiate smoking cessation education as indicated  - Encourage broncho-pulmonary hygiene including cough, deep breathe, Incentive Spirometry  - Assess the need for suctioning and aspirate as needed  - Assess and instruct to report SOB or any respiratory difficulty  - Respiratory Therapy support as indicated  Outcome: Progressing

## 2022-07-08 NOTE — PROGRESS NOTES
Progress Note - Pulmonary   Everrett Lisa 65 y o  male MRN: [id number]  Unit/Bed#: [address] [place] [address] Encounter: [id number]      Assessment:  1  Severe COPD with acute exacerbation, baseline FEV1 is 1 8 L or 45% predicted  2  Alpha-1 antitrypsin deficiency  3  Acute on Chronic Hypoxic Respiraitory Failure - baseline 3L nasal canula  4  Chronic mural base thrombus in left pulmonary artery extending into posterior aspect left lower lobe  Plan:  · Remains on 3L nasl canula which is his baseline  · Reports his breathing is about the same, not quite back to his baseline  · Continue Solumedrol 40mg IV BID for now, plan is for prolonged prednisone course upon discharge back to 10mg daily which he is to continue until seeing us in the office  · Continue nebulization therapy with budesonide, duonebs    Subjective:   Feels breathing is about the same, not quite back to baseline  Remains on 3L nasal canula    Vitals: Blood pressure 147/68, pulse 62, temperature 97 8 °F (36 6 °C), temperature source Oral, resp  rate 18, height 6' 5" (1 956 m), weight 84 kg (185 lb 3 2 oz), SpO2 96 %  , Body mass index is 21 96 kg/m²  Intake/Output Summary (Last 24 hours) at 7/8/2022 1158  Last data filed at 7/8/2022 0918  Gross per 24 hour   Intake 775 ml   Output 1100 ml   Net -325 ml       Physical Exam  Gen: Awake, alert, oriented x 3, no acute distress  HEENT: Mucous membranes moist, no oral lesions, no thrush  NECK: No accessory muscle use, JVP not elevated  Cardiac: Regular, single S1, single S2, no murmurs, no rubs, no gallops  Lungs: Good air entry, no wheezing  Abdomen: normoactive bowel sounds, soft nontender, nondistended, no rebound or rigidity, no guarding  Extremities: no cyanosis, no clubbing, no edema    Labs: I have personally reviewed pertinent lab results          Kelley Walters MD

## 2022-07-08 NOTE — CASE MANAGEMENT
Case Management Discharge Planning Note    Patient name Kim Del Angel  Location 3 [facility] 316/[address]-* MRN [id number]  : 1945 Date 2022       Current Admission Date: 7/3/2022  Current Admission Diagnosis:COPD with acute exacerbation [facility])   Patient Active Problem List    Diagnosis Date Noted    H/O cardiac catheterization 2022    Chest heaviness 2022    Hypertensive urgency 2022    Cognitive impairment 2021    Parkinson's disease ([address] ) 2021    Anxiety 2021    Vitamin D deficiency disease 2021    Avascular necrosis of hip, right ([address] ) 2021    Depression, recurrent ([address] ) 2021    Palliative care patient 11/10/2020    Orthostatic hypotension with spine hypertension 2020    COPD with acute exacerbation ([address] ) 2019    Ambulatory dysfunction 2019    Insomnia 2019    Chronic venous insufficiency 2019    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 ([address] ) 2019    Centrilobular emphysema ([address] ) 2018    CLAYTON (acute kidney injury) ([address] ) 2018    Chronic respiratory failure with hypoxia ([address] ) 2018    Chronic pulmonary embolism ([address] ) 2018    Former smoker 2018    Liver disease     Alpha-1-antitrypsin deficiency ([address] ) 2017    Gastroesophageal reflux disease 2017    Essential hypertension 2017    Acute and chronic respiratory failure with hypoxia ([address] ) 2017    BPH (benign prostatic hyperplasia) 2017    Peripheral neuropathy 2017    Allergic rhinitis 2016    Cataracts, both eyes 2015    Chronic diarrhea  2014    Benign colon polyp 2014      LOS (days): 4  Geometric Mean LOS (GMLOS) (days): 3 60  Days to GMLOS:-0 2     OBJECTIVE:  Risk of Unplanned Readmission Score: 23 85      Current admission status: Inpatient   Preferred Pharmacy:   [id number] - [address],  [place] [address] [place] [address]  Phone: [phone] Fax: [phone]    [id number] - [address] [address]  Phone: [phone] Fax: [phone]    [address], [address] [facility]  [address] \[facility] [place] [facility]\"" [address]  Phone: [phone] Fax: [phone]    Primary Care Provider: Deborah Proctor MD    Primary Insurance: MEDICARE  Secondary Insurance: [employer] [place]    DISCHARGE DETAILS:    Discharge planning discussed with[de-identified] Patient    Treatment Team Recommendation: Home with [address]  Discharge Destination Plan[de-identified] Home with [place] at Discharge : Family     IMM Given (Date):: 07/08/22  IMM Given to[de-identified] Patient (IMM#2 reviewed with patient  Patient gave verbal understanding, signed, and declined a copy  Original placed in scan bin )    GAY advised [facility] of anticipated discharge tomorrow  Will fax AVS when available  Dtr Terrie Gupta also made aware  SW rescheduled Palliative Care appointment with Dr Reta Burger for next available on 8/16 at [phone]  Info written on AVS      PCP TCM appt  scheduled for 7/11 at 1425  Pulmonary f/u appt  Scheduled for 7/14 at 1125  Handoff provided to OP [facility] CM

## 2022-07-09 VITALS
OXYGEN SATURATION: 95 % | BODY MASS INDEX: 21.92 KG/M2 | WEIGHT: 185.63 LBS | SYSTOLIC BLOOD PRESSURE: 160 MMHG | HEART RATE: 61 BPM | TEMPERATURE: 97.4 F | DIASTOLIC BLOOD PRESSURE: 82 MMHG | RESPIRATION RATE: 16 BRPM | HEIGHT: 77 IN

## 2022-07-09 PROBLEM — J44.1 COPD WITH ACUTE EXACERBATION (HCC): Status: RESOLVED | Noted: 2019-12-29 | Resolved: 2022-07-09

## 2022-07-09 PROCEDURE — 94760 N-INVAS EAR/PLS OXIMETRY 1: CPT

## 2022-07-09 PROCEDURE — 94640 AIRWAY INHALATION TREATMENT: CPT

## 2022-07-09 PROCEDURE — 99238 HOSP IP/OBS DSCHRG MGMT 30/<: CPT | Performed by: FAMILY MEDICINE

## 2022-07-09 RX ORDER — FORMOTEROL FUMARATE 20 UG/2ML
20 SOLUTION RESPIRATORY (INHALATION)
Qty: 120 ML | Refills: 0 | Status: SHIPPED | OUTPATIENT
Start: 2022-07-09 | End: 2022-08-08

## 2022-07-09 RX ORDER — PREDNISONE 10 MG/1
TABLET ORAL
Qty: 93 TABLET | Refills: 0 | Status: SHIPPED | OUTPATIENT
Start: 2022-07-09 | End: 2022-08-18

## 2022-07-09 RX ADMIN — IPRATROPIUM BROMIDE AND ALBUTEROL SULFATE 3 ML: 2.5; .5 SOLUTION RESPIRATORY (INHALATION) at 07:15

## 2022-07-09 RX ADMIN — CARBIDOPA AND LEVODOPA 1 TABLET: 25; 100 TABLET ORAL at 08:09

## 2022-07-09 RX ADMIN — ENOXAPARIN SODIUM 40 MG: 40 INJECTION SUBCUTANEOUS at 08:09

## 2022-07-09 RX ADMIN — BUSPIRONE HYDROCHLORIDE 10 MG: 10 TABLET ORAL at 08:09

## 2022-07-09 RX ADMIN — PANTOPRAZOLE SODIUM 40 MG: 40 TABLET, DELAYED RELEASE ORAL at 08:09

## 2022-07-09 RX ADMIN — AMLODIPINE BESYLATE 10 MG: 10 TABLET ORAL at 08:09

## 2022-07-09 RX ADMIN — METOPROLOL TARTRATE 25 MG: 25 TABLET, FILM COATED ORAL at 08:09

## 2022-07-09 RX ADMIN — FINASTERIDE 5 MG: 5 TABLET, FILM COATED ORAL at 08:09

## 2022-07-09 RX ADMIN — IPRATROPIUM BROMIDE AND ALBUTEROL SULFATE 3 ML: 2.5; .5 SOLUTION RESPIRATORY (INHALATION) at 02:35

## 2022-07-09 RX ADMIN — BUDESONIDE 0.5 MG: 0.5 INHALANT ORAL at 07:15

## 2022-07-09 RX ADMIN — TAMSULOSIN HYDROCHLORIDE 0.4 MG: 0.4 CAPSULE ORAL at 08:09

## 2022-07-09 RX ADMIN — FORMOTEROL FUMARATE DIHYDRATE 20 MCG: 20 SOLUTION RESPIRATORY (INHALATION) at 07:34

## 2022-07-09 RX ADMIN — METHYLPREDNISOLONE SODIUM SUCCINATE 40 MG: 40 INJECTION, POWDER, FOR SOLUTION INTRAMUSCULAR; INTRAVENOUS at 08:09

## 2022-07-09 RX ADMIN — IPRATROPIUM BROMIDE AND ALBUTEROL SULFATE 3 ML: 2.5; .5 SOLUTION RESPIRATORY (INHALATION) at 13:11

## 2022-07-09 RX ADMIN — GABAPENTIN 200 MG: 100 CAPSULE ORAL at 08:09

## 2022-07-09 NOTE — DISCHARGE INSTRUCTIONS
COPD (Chronic Obstructive Pulmonary Disease)   WHAT YOU NEED TO KNOW:   COPD (chronic obstructive pulmonary disease) can get worse quickly  Your healthcare providers will help you create a care plan to use at home  The plan will give directions on how to prevent or manage shortness of breath  Your family members or anyone who cares for you will also get directions to help you  DISCHARGE INSTRUCTIONS:   Call your local emergency number (911 in the [facility] [facility]) if:   You feel lightheaded, short of breath, and have chest pain  Return to the emergency department if:   You cough up blood  You are confused, dizzy, or feel faint  Your arm or leg feels warm, tender, and painful  It may look swollen and red  Call your doctor if:   You have increased shortness of breath  You need more medicine than usual to control your symptoms  You are coughing or wheezing more than usual     You are coughing up more mucus, or it has a new color or odor  You gain more than 3 pounds in a week  You have a fever, a runny or stuffy nose, and a sore throat, or other cold or flu symptoms  Your skin, lips, or nails start to turn blue  You have swelling in your legs or ankles  You are very tired or weak for more than a day  You notice changes in your mood, or changes in your ability to think or concentrate  You have questions or concerns about your condition or care  Medicines:   Short-acting bronchodilators  may be called rescue inhalers or relievers  They relieve sudden, severe symptoms and start to work right away  Long-acting bronchodilators  may be called controllers  This medicine helps open the airways over time, and is used to decrease and prevent breathing problems  Long-acting bronchodilators should not be used to treat sudden, severe symptoms, such as trouble breathing  Antibiotics may be given for up to 5 days to treat a bacterial infection during an exacerbation      Take your medicine as directed  Contact your healthcare provider if you think your medicine is not helping or if you have side effects  Tell him or her if you are allergic to any medicine  Keep a list of the medicines, vitamins, and herbs you take  Include the amounts, and when and why you take them  Bring the list or the pill bottles to follow-up visits  Carry your medicine list with you in case of an emergency  Help make breathing easier:   Use pursed-lip breathing any time you feel short of breath  Take a deep breath in through your nose  Slowly breathe out through your mouth with your lips pursed  Try to take 2 times as long to breathe out as to breathe in  This helps you get rid of as much air from your lungs as possible  You can also practice this breathing pattern while you bend, lift, climb stairs, or exercise  It slows down your breathing and helps move more air in and out of your lungs  Avoid anything that makes your symptoms worse  Stay out of high altitudes and places with high humidity  Stay inside, or cover your mouth and nose with a scarf when you are outside in cold weather  Stay inside on days when air pollution or pollen counts are high  Do not use aerosol sprays such as deodorant, bug spray, and hairspray  Exercise as directed  Your healthcare provider may recommend at least 20 minutes of exercise each day to help increase your energy and decrease shortness of breath  Talk to your provider about the best exercise plan for you  Manage COPD and help prevent exacerbations:  COPD is a serious condition that gets worse over time  A COPD exacerbation means your symptoms suddenly get worse  It is important to prevent exacerbations  An exacerbation can cause more lung damage  COPD cannot be cured, but you can take action to feel better and prevent exacerbations:  Do not smoke  Nicotine and other chemicals in cigarettes and cigars can cause lung damage and make your COPD worse   Ask your healthcare provider for information if you currently smoke and need help to quit  E-cigarettes or smokeless tobacco still contain nicotine  Talk to your healthcare provider before you use these products  Avoid secondhand smoke  This is smoke another person exhales  Even if you have never smoked or have quit, it is important to avoid secondhand smoke  This smoke can also cause lung damage or trigger an exacerbation  Go to pulmonary rehabilitation (rehab) if directed  Rehab is a program run by specialists who help you learn to manage COPD  Examples include a pulmonologist (lung specialist), dietitian, or exercise therapist  The specialists will help you make a plan to avoid triggers that cause an exacerbation  Take your medicines as directed  Refill your medicines before you are out so that you do not miss a dose  Ask your healthcare provider if you have any questions on how to take your medicines  Protect yourself from germs  Germs can get into your lungs and cause an infection  An infection in your lungs can create more mucus and make it harder to breathe  An infection can also create swelling in your airway and prevent air from getting in  You can decrease your risk for infection by doing the following:         Wash your hands often with soap and water  Carry germ-killing gel with you  You can use the gel to clean your hands when soap and water are not available  Do not touch your eyes, nose, or mouth unless you have washed your hands first     Always cover your mouth when you cough  Cough into a tissue or your shirtsleeve so you do not spread germs from your hands  Try to avoid people who have a cold or the flu  If you are sick, stay away from others as much as possible  Ask about vaccines you may need  Influenza (the flu), pneumonia, and COVID-19 can become life-threatening for a person who has COPD  Get a yearly flu vaccine as soon as recommended, usually in September or October   The pneumonia vaccine may be given every 5 years, or as directed  COVID-19 vaccines are available in shots given in 1 or 2 doses  Your healthcare provider can tell you if you should also get other vaccines, and when to get them  Drink liquids as directed  You may need to drink more liquid than usual  Liquid will help to keep your air passages moist and help you cough up mucus  Ask how much liquid to drink each day and which liquids are best for you  Follow up with your doctor as directed: You may need more tests  Your doctor may refer you to a specialist, depending on your needs  Some specialist services may be available through your pulmonary rehab program  Write down your questions so you remember to ask them during your visits  © Copyright [employer] 2022 Information is for End User's use only and may not be sold, redistributed or otherwise used for commercial purposes  All illustrations and images included in CareNotes® are the copyrighted property of A D A M , Inc  or Razia Rojas  The above information is an  only  It is not intended as medical advice for individual conditions or treatments  Talk to your doctor, nurse or pharmacist before following any medical regimen to see if it is safe and effective for you

## 2022-07-09 NOTE — DISCHARGE INSTR - AVS FIRST PAGE
Take 4 tablets (40 mg total) by mouth daily for 7 days, starting 7/10/22 THEN 3 tablets (30 mg total) daily for 7 days, THEN 2 tablets (20 mg total) daily for 7 days, THEN 1 tablet (10 mg total) daily    Follow up with pulmonology as soon as possible  Follow up with [facility]

## 2022-07-09 NOTE — CASE MANAGEMENT
Case Management Discharge Planning Note    Patient name Kim Del Angel  Location 2 [facility] [address]  MRN [id number]  : 1945 Date 2022       Current Admission Date: 7/3/2022  Current Admission Diagnosis:COPD with acute exacerbation [facility])   Patient Active Problem List    Diagnosis Date Noted    H/O cardiac catheterization 2022    Chest heaviness 2022    Hypertensive urgency 2022    Cognitive impairment 2021    Parkinson's disease ([address] ) 2021    Anxiety 2021    Vitamin D deficiency disease 2021    Avascular necrosis of hip, right ([address] ) 2021    Depression, recurrent ([address] ) 2021    Palliative care patient 11/10/2020    Orthostatic hypotension with spine hypertension 2020    COPD with acute exacerbation ([address] ) 2019    Ambulatory dysfunction 2019    Insomnia 2019    Chronic venous insufficiency 2019    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 ([address] ) 2019    Centrilobular emphysema ([address] ) 2018    CLAYTON (acute kidney injury) ([address] ) 2018    Chronic respiratory failure with hypoxia ([address] ) 2018    Chronic pulmonary embolism ([address] ) 2018    Former smoker 2018    Liver disease     Alpha-1-antitrypsin deficiency ([address] ) 2017    Gastroesophageal reflux disease 2017    Essential hypertension 2017    Acute and chronic respiratory failure with hypoxia ([address] ) 2017    BPH (benign prostatic hyperplasia) 2017    Peripheral neuropathy 2017    Allergic rhinitis 2016    Cataracts, both eyes 2015    Chronic diarrhea  2014    Benign colon polyp 2014      LOS (days): 5  Geometric Mean LOS (GMLOS) (days): 3 60  Days to GMLOS:-1 3     OBJECTIVE:  Risk of Unplanned Readmission Score: 24 17     Current admission status: Inpatient   Preferred Pharmacy:   [id number] - [address], [address] [facility] [address]  Phone: [phone] Fax: [phone]    [address] [facility] [address]  [address] [place] [address]  Phone: [phone] Fax: [phone] [address], [address]  [address]  [place] [address]  Phone: [phone] Fax: [phone]    Primary Care Provider: Magnus Do MD    Primary Insurance: MEDICARE  Secondary Insurance: [facility]    DISCHARGE DETAILS:    [address]         Is the patient interested in [address] at discharge?: Yes    Other Referral/Resources/Interventions Provided:  Interventions: [address]  Referral Comments: Pt discharged home today with services in place through [facility]  SW notified agency of discharge through [employer] and faxed AVS as requested      Treatment Team Recommendation: Home with [address] [facility]  Discharge Destination Plan[de-identified] Home with [place] at Discharge : Family

## 2022-07-09 NOTE — DISCHARGE SUMMARY
[facility] Discharge Summary - Medical Alison Henry 65 y o  male MRN: [id number]    [address]  Room / Bed: [place] [address] Encounter: [id number]    BRIEF OVERVIEW  Admitting Provider: Lisa Roper MD  Discharge Provider: No att  providers found    Discharge To: Home with Home health       Outpatient Follow-Up:    Pulmonology  [facility]     Things to address at first follow up visit:   · Any shortness of breath ? · Has patient started prednisone since discharge? · Has patient followed up with pulmonology? · Is patient taking peroforomist ?  · Blood pressure    Labs and results pending at discharge:  None    Admission Date: 7/3/2022     Discharge Date: 7/9/2022  3:00 PM    Primary Discharge Diagnosis  Principal Problem:    COPD with acute exacerbation ([facility] )  Active Problems:    BPH (benign prostatic hyperplasia)    Essential hypertension    Alpha-1-antitrypsin deficiency (HCC)    Chronic pulmonary embolism (HCC)    Peripheral neuropathy    Depression, recurrent (HCC)    Anxiety    Parkinson's disease ([facility] )  Resolved Problems:    CKD (chronic kidney disease) stage 2, GFR 60-89 ml/min      Consulting Providers   Pulmonology        [address] STAY    Procedures Performed/Pertinent Test results  WBC 6-7   HGB 11    CTA ED chest PE study   Final Result      No evidence of acute pulmonary embolism  Chronic-appearing mural-based thrombus within the posterior aspect of the distal left main pulmonary artery and extending into the posterior aspect left lower lobe pulmonary artery appears unchanged compared with    April 28, 2022  COPD with moderate to advanced emphysema, unchanged  No evidence of focal consolidation  No pneumothorax  Atherosclerosis  Coronary artery disease  Workstation performed: [id number]         XR chest 1 view portable   Final Result      Emphysema with no acute disease                    Workstation performed: [id number]               HPI  Pt is a 69yo male with PMH of A1AT, HTN, BPH, anxiety, depression on palliative care who is presenting with a few days of SOB  Recently admitted for COPD exacerbation, followed by SNF stay  Per ED attending, pt received breathing tx by EMS, unsure which ones  Pt reports improved SOB since receiving solumedrol in ED  Denies chest pain, headaches, nausea, vomiting, diarrhea, visual changes  ED Course: IV solumedrol 15mg x1    Hospital Course    COPD with acute exacerbation  Patient admitted on 07/04, started on Solumedrol 40 mg BID and home Pulmicort 0 5 mg q12h, Duonebs 0 5-2 5 mg/3mL Q6H subcutaneous, and Q4PRN  Solumedrol was continued on same dosage from 07/04 to 07/09  D dimer was elevated at 1 37, CTA PE read as chronic thrombus, unchanged from prior, COPD with advanced emphysema  CXR showed emphysema  Pulmonary consult on 07/07, recommended adding Perforomist 20 mcg BID  Recommended Prednisone taper starting 40 mg daily with 10 mg taper every week and maintenance dose of 10 mg daily until reevaluation; PFT's in 5-6 weeks after discharge as outpatient  On discharge, patient denies chest pain, headache, nausea, vomiting, abdominal pain  Speaking in complete sentences, stable SPO2 99% on 3L NC, which is his baseline  Discharged to home with home health care [address]  Alpha-1-antitrypsin deficiency  Received weekly Zemaira infusion with home health aide previously as per pulm, none inpatient  Essential hypertension   Elevated systolic BP of 022-754 on admission due to missed home dose  Hydralazine 5 mg was given once IV on 07/04  BP stable on home dose Norvasc 10 mg daily and metoprolol 25 mg BID  Magnesium sulfate 2g/50 mL IVPB 2 g  Once on 07/04    CKD Stage 2, GFR 60-89  Cr stable from 07/04-07/08 (0 91-1 19), improved from admission 1 39 on 07/03  eGFR 58-80 from 07/05 to 07/08  CBC's and CMP's WNL      Home meds were continued as scheduled   BPH - Flomax 0 4 mg and Finasteride 5 mg daily  Parkinson's - Sinemet  mg TID  Anxiety - Buspar 10 mg TID  Depression - Remeron 30 mg QHS  Peripheral neuropathy - Gabapentin 200 mg BID      Physical Exam at Discharge  /82   Pulse 61   Temp (!) 97 4 °F (36 3 °C)   Resp 16   Ht 6' 5" (1 956 m)   Wt 84 2 kg (185 lb 10 oz)   SpO2 95%   BMI 22 01 kg/m²   General appearance: alert and oriented, in no acute distress  Lungs: mild expiratory wheezes  Heart: regular rate and rhythm, S1, S2 normal, no murmur, click, rub or gallop  Extremities: extremities normal, warm and well-perfused; no cyanosis, clubbing, or edema    Medications       Discharge Medication List as of 7/9/2022  2:43 PM          Discharge Medication List as of 7/9/2022  2:43 PM      CONTINUE these medications which have NOT CHANGED    Details   amLODIPine (NORVASC) 10 mg tablet TAKE ONE TABLET BY MOUTH DAILY , Normal      budesonide (PULMICORT) 0 5 mg/2 mL nebulizer solution Take 2 mL (0 5 mg total) by nebulization in the morning and 2 mL (0 5 mg total) in the evening , Starting Wed 5/11/2022, Until Tue 8/9/2022, Normal      cholestyramine (QUESTRAN) 4 g packet Take 1 packet (4 g total) by mouth in the morning , Starting Wed 5/11/2022, Normal      finasteride (PROSCAR) 5 mg tablet TAKE ONE TABLET BY MOUTH IN THE MORNING , Normal      fluticasone (FLONASE) 50 mcg/act nasal spray 2 sprays into each nostril daily, Starting Mon 7/6/2020, Normal      gabapentin (NEURONTIN) 100 mg capsule Take 2 capsules (200 mg total) by mouth 2 (two) times a day, Starting Wed 6/22/2022, Normal      ipratropium-albuterol (DUO-NEB) 0 5-2 5 mg/3 mL nebulizer solution Take 3 mL by nebulization 4 (four) times a day, Starting Wed 8/11/2021, Normal      metoprolol tartrate (LOPRESSOR) 25 mg tablet Take 1 tablet (25 mg total) by mouth every 12 (twelve) hours, Starting Wed 6/22/2022, Normal      midodrine (PROAMATINE) 10 MG tablet Take 1 tab three times daily AS NEEDED if SBP less than 100  Please hold medication if blood pressure is above 996 systolic , No Print      mirtazapine (REMERON) 15 mg tablet Take 2 tablets (30 mg total) by mouth daily at bedtime, Starting Mon 5/23/2022, Normal      OXYGEN-HELIUM IN Inhale 2L at rest- 3L with activity, Historical Med      pantoprazole (PROTONIX) 40 mg tablet TAKE ONE TABLET BY MOUTH TWICE DAILY, Normal      tamsulosin (FLOMAX) 0 4 mg TAKE ONE CAPSULE BY MOUTH ONE TIME DAILY, Normal      Ventolin  (90 Base) MCG/ACT inhaler Inhale 2 puffs every 4 (four) hours as needed for wheezing, Starting Mon 11/8/2021, Normal      ZEMAIRA infusion once a week , Starting u 12/26/2019, Historical Med      busPIRone (BUSPAR) 10 mg tablet TAKE 1 TABLET BY MOUTH 3 TIMES DAILY, Normal      carbidopa-levodopa (SINEMET)  mg per tablet take 1 tablet by mouth prior to each meal, Normal            Discharge Medication List as of 7/9/2022  2:43 PM      START taking these medications    Details   formoterol (PERFOROMIST) 20 MCG/2ML nebulizer solution Take 2 mL (20 mcg total) by nebulization every 12 (twelve) hours, Starting Sat 7/9/2022, Until Mon 8/8/2022, Normal      predniSONE 10 mg tablet Multiple Dosages:Starting Sat 7/9/2022, Until Fri 7/15/2022 at 2359, THEN Starting Sat 7/16/2022, Until Fri 7/22/2022 at 2359, THEN Starting Sat 7/23/2022, Until Fri 7/29/2022 at 2359, THEN Starting Sat 7/30/2022, Until Sun 8/28/2022 at 2359Take 4 t ablets (40 mg total) by mouth daily for 7 days, THEN 3 tablets (30 mg total) daily for 7 days, THEN 2 tablets (20 mg total) daily for 7 days, THEN 1 tablet (10 mg total) daily  , Normal            Discharge Medication List as of 7/9/2022  2:43 PM             Allergies  Allergies   Allergen Reactions    Chantix [Varenicline]      Caused prostate infection       Diet restrictions: as tolerated  Activity restrictions: as tolerated  Code Status: Level 1 - Full Code  Advance Directive and Living Will: <no information>  Power of :    POLST:      Discharge Condition: stable      Discharge  Statement   I spent 20 minutes discharging the patient  This time was spent on the day of discharge  I had direct contact with the patient on the day of discharge  Additional documentation is required if more than 30 minutes were spent on discharge

## 2022-07-09 NOTE — PLAN OF CARE
Problem: Potential for Falls  Goal: Patient will remain free of falls  Description: INTERVENTIONS:  - Educate patient/family on patient safety including physical limitations  - Instruct patient to call for assistance with activity   - Consult OT/PT to assist with strengthening/mobility   - Keep Call bell within reach  - Keep bed low and locked with side rails adjusted as appropriate  - Keep care items and personal belongings within reach  - Initiate and maintain comfort rounds  - Make Fall Risk Sign visible to staff  - Offer Toileting every 2 Hours, in advance of need  - Initiate/Maintain bed/chair alarm    - Apply yellow socks and bracelet for high fall risk patients  - Consider moving patient to room near nurses station  Outcome: Progressing     Problem: Prexisting or High Potential for Compromised Skin Integrity  Goal: Skin integrity is maintained or improved  Description: INTERVENTIONS:  - Identify patients at risk for skin breakdown  - Assess and monitor skin integrity  - Assess and monitor nutrition and hydration status  - Monitor labs   - Assess for incontinence   - Turn and reposition patient  - Assist with mobility/ambulation  - Relieve pressure over bony prominences  - Avoid friction and shearing  - Provide appropriate hygiene as needed including keeping skin clean and dry  - Evaluate need for skin moisturizer/barrier cream  - Collaborate with interdisciplinary team   - Patient/family teaching  - Consider wound care consult   Outcome: Progressing     Problem: MOBILITY - ADULT  Goal: Maintain or return to baseline ADL function  Description: INTERVENTIONS:  -  Assess patient's ability to carry out ADLs; assess patient's baseline for ADL function and identify physical deficits which impact ability to perform ADLs (bathing, care of mouth/teeth, toileting, grooming, dressing, etc )  - Assess/evaluate cause of self-care deficits   - Assess range of motion  - Assess patient's mobility; develop plan if impaired  - Assess patient's need for assistive devices and provide as appropriate  - Encourage maximum independence but intervene and supervise when necessary  - Involve family in performance of ADLs  - Assess for home care needs following discharge   - Consider OT consult to assist with ADL evaluation and planning for discharge  - Provide patient education as appropriate  Outcome: Progressing  Goal: Maintains/Returns to pre admission functional level  Description: INTERVENTIONS:  - Perform BMAT or MOVE assessment daily    - Set and communicate daily mobility goal to care team and patient/family/caregiver  - Collaborate with rehabilitation services on mobility goals if consulted  - Perform Range of Motion 2  times a day  - Reposition patient every 2 hours    - Dangle patient 2 times a day  - Stand patient 2 times a day  - Ambulate patient 2 times a day as tolerated  - Out of bed to chair daily   - Out of bed for meals 3 times a day  - Out of bed for toileting  - Record patient progress and toleration of activity level   Outcome: Progressing     Problem: RESPIRATORY - ADULT  Goal: Achieves optimal ventilation and oxygenation  Description: INTERVENTIONS:  - Assess for changes in respiratory status  - Assess for changes in mentation and behavior  - Position to facilitate oxygenation and minimize respiratory effort  - Oxygen administered by appropriate delivery if ordered  - Initiate smoking cessation education as indicated  - Encourage broncho-pulmonary hygiene including cough, deep breathe, Incentive Spirometry  - Assess the need for suctioning and aspirate as needed  - Assess and instruct to report SOB or any respiratory difficulty  - Respiratory Therapy support as indicated  Outcome: Progressing

## 2022-07-11 ENCOUNTER — TRANSITIONAL CARE MANAGEMENT (OUTPATIENT)
Dept: FAMILY MEDICINE CLINIC | Facility: CLINIC | Age: 77
End: 2022-07-11

## 2022-07-12 ENCOUNTER — PATIENT OUTREACH (OUTPATIENT)
Dept: FAMILY MEDICINE CLINIC | Facility: CLINIC | Age: 77
End: 2022-07-12

## 2022-07-12 NOTE — PROGRESS NOTES
Outreach to patient  Pt reports that he is "gasping for air and feels faint " Pt reports that he has been feeling this way since he woke up  Reports that his saturation is 95 percent  Suggested that he call 911  Pt states he will wait  He is expecting the infusion nurse to arrive at any minute  Pt agreeable to STR at this time  Infusion nurse Mague arrived while RN MITUL was speaking to patient  Herman ( contact number [phone]) advised that patient has inspiratory and expiratory wheezes  Pt took neb treatment prior to nurse arriving  Reviewed AVS  Pt was prescribed steroids  Is taking them as directed  RN CM will reach out to Dr Long while Herman attempts to start infusion  Communicated with Dr Aidan Fish  Pt is already taking 40 mg of steriods daily  He will go to ED for evaluation after he completes his infusion if there is no improvement  S/W Herman infusion RN  States he will assess the patient and direct him to call 911 if not improved  TT sent to Ivett Murdock, inpatient CM advising of above

## 2022-07-12 NOTE — PROGRESS NOTES
Outreach to patient  Pt is feeling better at this time and is not chosing to go to ED for evaluation  States he is breathing better  He received his infusion  Pt states if anything changes he will call 911

## 2022-07-14 ENCOUNTER — HOSPITAL ENCOUNTER (OUTPATIENT)
Facility: HOSPITAL | Age: 77
Setting detail: OBSERVATION
Discharge: NURSING, REHAB OR LONG-TERM CARE | End: 2022-07-15
Attending: EMERGENCY MEDICINE | Admitting: STUDENT IN AN ORGANIZED HEALTH CARE EDUCATION/TRAINING PROGRAM
Payer: MEDICARE

## 2022-07-14 ENCOUNTER — APPOINTMENT (EMERGENCY)
Dept: RADIOLOGY | Facility: HOSPITAL | Age: 77
End: 2022-07-14
Payer: MEDICARE

## 2022-07-14 ENCOUNTER — DOCUMENTATION (OUTPATIENT)
Dept: PULMONOLOGY | Facility: MEDICAL CENTER | Age: 77
End: 2022-07-14

## 2022-07-14 ENCOUNTER — TELEPHONE (OUTPATIENT)
Dept: PULMONOLOGY | Facility: MEDICAL CENTER | Age: 77
End: 2022-07-14

## 2022-07-14 DIAGNOSIS — E88.01 ALPHA-1-ANTITRYPSIN DEFICIENCY (HCC): ICD-10-CM

## 2022-07-14 DIAGNOSIS — R42 LIGHTHEADEDNESS: ICD-10-CM

## 2022-07-14 DIAGNOSIS — R06.02 SHORTNESS OF BREATH: Primary | ICD-10-CM

## 2022-07-14 DIAGNOSIS — I10 ESSENTIAL HYPERTENSION: ICD-10-CM

## 2022-07-14 DIAGNOSIS — J44.9 COPD (CHRONIC OBSTRUCTIVE PULMONARY DISEASE) (HCC): ICD-10-CM

## 2022-07-14 DIAGNOSIS — R06.09 EXERTIONAL DYSPNEA: ICD-10-CM

## 2022-07-14 LAB
ALBUMIN SERPL BCP-MCNC: 3.5 G/DL (ref 3.5–5)
ALP SERPL-CCNC: 62 U/L (ref 46–116)
ALT SERPL W P-5'-P-CCNC: 81 U/L (ref 12–78)
ANION GAP SERPL CALCULATED.3IONS-SCNC: 10 MMOL/L (ref 4–13)
ARTERIAL PATENCY WRIST A: YES
AST SERPL W P-5'-P-CCNC: 38 U/L (ref 5–45)
BASE EXCESS BLDA CALC-SCNC: -3.4 MMOL/L
BASOPHILS # BLD MANUAL: 0 THOUSAND/UL (ref 0–0.1)
BASOPHILS NFR MAR MANUAL: 0 % (ref 0–1)
BILIRUB SERPL-MCNC: 1.06 MG/DL (ref 0.2–1)
BODY TEMPERATURE: 98.4 DEGREES FEHRENHEIT
BUN SERPL-MCNC: 21 MG/DL (ref 5–25)
CALCIUM SERPL-MCNC: 8.5 MG/DL (ref 8.3–10.1)
CHLORIDE SERPL-SCNC: 104 MMOL/L (ref 100–108)
CO2 SERPL-SCNC: 24 MMOL/L (ref 21–32)
CREAT SERPL-MCNC: 1.04 MG/DL (ref 0.6–1.3)
EOSINOPHIL # BLD MANUAL: 0 THOUSAND/UL (ref 0–0.4)
EOSINOPHIL NFR BLD MANUAL: 0 % (ref 0–6)
ERYTHROCYTE [DISTWIDTH] IN BLOOD BY AUTOMATED COUNT: 15.8 % (ref 11.6–15.1)
FLUAV RNA RESP QL NAA+PROBE: NEGATIVE
FLUBV RNA RESP QL NAA+PROBE: NEGATIVE
GFR SERPL CREATININE-BSD FRML MDRD: 68 ML/MIN/1.73SQ M
GLUCOSE SERPL-MCNC: 138 MG/DL (ref 65–140)
HCO3 BLDA-SCNC: 20.2 MMOL/L (ref 22–28)
HCT VFR BLD AUTO: 39.4 % (ref 36.5–49.3)
HGB BLD-MCNC: 13.3 G/DL (ref 12–17)
LYMPHOCYTES # BLD AUTO: 2.12 THOUSAND/UL (ref 0.6–4.47)
LYMPHOCYTES # BLD AUTO: 21 % (ref 14–44)
MCH RBC QN AUTO: 32.6 PG (ref 26.8–34.3)
MCHC RBC AUTO-ENTMCNC: 33.8 G/DL (ref 31.4–37.4)
MCV RBC AUTO: 97 FL (ref 82–98)
METAMYELOCYTES NFR BLD MANUAL: 1 % (ref 0–1)
MONOCYTES # BLD AUTO: 0.3 THOUSAND/UL (ref 0–1.22)
MONOCYTES NFR BLD: 3 % (ref 4–12)
NASAL CANNULA: 3
NEUTROPHILS # BLD MANUAL: 7.57 THOUSAND/UL (ref 1.85–7.62)
NEUTS SEG NFR BLD AUTO: 75 % (ref 43–75)
O2 CT BLDA-SCNC: 19.1 ML/DL (ref 16–23)
OXYHGB MFR BLDA: 97.3 % (ref 94–97)
PCO2 BLDA: 32.2 MM HG (ref 36–44)
PCO2 TEMP ADJ BLDA: 32.1 MM HG (ref 36–44)
PH BLD: 7.42 [PH] (ref 7.35–7.45)
PH BLDA: 7.42 [PH] (ref 7.35–7.45)
PLATELET # BLD AUTO: 122 THOUSANDS/UL (ref 149–390)
PLATELET BLD QL SMEAR: ABNORMAL
PMV BLD AUTO: 9.7 FL (ref 8.9–12.7)
PO2 BLD: 105.6 MM HG (ref 75–129)
PO2 BLDA: 106.2 MM HG (ref 75–129)
POTASSIUM SERPL-SCNC: 4.4 MMOL/L (ref 3.5–5.3)
PROT SERPL-MCNC: 6.3 G/DL (ref 6.4–8.2)
RBC # BLD AUTO: 4.08 MILLION/UL (ref 3.88–5.62)
RBC MORPH BLD: NORMAL
RSV RNA RESP QL NAA+PROBE: NEGATIVE
SARS-COV-2 RNA RESP QL NAA+PROBE: NEGATIVE
SODIUM SERPL-SCNC: 138 MMOL/L (ref 136–145)
SPECIMEN SOURCE: ABNORMAL
WBC # BLD AUTO: 10.09 THOUSAND/UL (ref 4.31–10.16)

## 2022-07-14 PROCEDURE — 99285 EMERGENCY DEPT VISIT HI MDM: CPT

## 2022-07-14 PROCEDURE — 99285 EMERGENCY DEPT VISIT HI MDM: CPT | Performed by: EMERGENCY MEDICINE

## 2022-07-14 PROCEDURE — 82805 BLOOD GASES W/O2 SATURATION: CPT

## 2022-07-14 PROCEDURE — 80053 COMPREHEN METABOLIC PANEL: CPT | Performed by: EMERGENCY MEDICINE

## 2022-07-14 PROCEDURE — 36415 COLL VENOUS BLD VENIPUNCTURE: CPT | Performed by: EMERGENCY MEDICINE

## 2022-07-14 PROCEDURE — 94640 AIRWAY INHALATION TREATMENT: CPT

## 2022-07-14 PROCEDURE — 96374 THER/PROPH/DIAG INJ IV PUSH: CPT

## 2022-07-14 PROCEDURE — 36600 WITHDRAWAL OF ARTERIAL BLOOD: CPT

## 2022-07-14 PROCEDURE — 71045 X-RAY EXAM CHEST 1 VIEW: CPT

## 2022-07-14 PROCEDURE — 94664 DEMO&/EVAL PT USE INHALER: CPT

## 2022-07-14 PROCEDURE — 0241U HB NFCT DS VIR RESP RNA 4 TRGT: CPT | Performed by: EMERGENCY MEDICINE

## 2022-07-14 PROCEDURE — 94644 CONT INHLJ TX 1ST HOUR: CPT

## 2022-07-14 PROCEDURE — 85007 BL SMEAR W/DIFF WBC COUNT: CPT | Performed by: EMERGENCY MEDICINE

## 2022-07-14 PROCEDURE — 94760 N-INVAS EAR/PLS OXIMETRY 1: CPT

## 2022-07-14 PROCEDURE — 85027 COMPLETE CBC AUTOMATED: CPT | Performed by: EMERGENCY MEDICINE

## 2022-07-14 RX ORDER — FINASTERIDE 5 MG/1
5 TABLET, FILM COATED ORAL EVERY MORNING
Status: DISCONTINUED | OUTPATIENT
Start: 2022-07-15 | End: 2022-07-16 | Stop reason: HOSPADM

## 2022-07-14 RX ORDER — FORMOTEROL FUMARATE 20 UG/2ML
20 SOLUTION RESPIRATORY (INHALATION)
Status: DISCONTINUED | OUTPATIENT
Start: 2022-07-14 | End: 2022-07-16 | Stop reason: HOSPADM

## 2022-07-14 RX ORDER — IPRATROPIUM BROMIDE AND ALBUTEROL SULFATE 2.5; .5 MG/3ML; MG/3ML
3 SOLUTION RESPIRATORY (INHALATION) EVERY 6 HOURS PRN
Status: DISCONTINUED | OUTPATIENT
Start: 2022-07-14 | End: 2022-07-16 | Stop reason: HOSPADM

## 2022-07-14 RX ORDER — MECLIZINE HYDROCHLORIDE 25 MG/1
25 TABLET ORAL ONCE
Status: COMPLETED | OUTPATIENT
Start: 2022-07-14 | End: 2022-07-14

## 2022-07-14 RX ORDER — TAMSULOSIN HYDROCHLORIDE 0.4 MG/1
0.4 CAPSULE ORAL DAILY
Status: DISCONTINUED | OUTPATIENT
Start: 2022-07-15 | End: 2022-07-16 | Stop reason: HOSPADM

## 2022-07-14 RX ORDER — ENOXAPARIN SODIUM 100 MG/ML
40 INJECTION SUBCUTANEOUS DAILY
Status: DISCONTINUED | OUTPATIENT
Start: 2022-07-15 | End: 2022-07-16 | Stop reason: HOSPADM

## 2022-07-14 RX ORDER — IPRATROPIUM BROMIDE AND ALBUTEROL SULFATE 2.5; .5 MG/3ML; MG/3ML
3 SOLUTION RESPIRATORY (INHALATION) ONCE
Status: COMPLETED | OUTPATIENT
Start: 2022-07-14 | End: 2022-07-14

## 2022-07-14 RX ORDER — FLUTICASONE PROPIONATE 50 MCG
2 SPRAY, SUSPENSION (ML) NASAL DAILY
Status: DISCONTINUED | OUTPATIENT
Start: 2022-07-15 | End: 2022-07-16 | Stop reason: HOSPADM

## 2022-07-14 RX ORDER — BUDESONIDE 0.5 MG/2ML
0.5 INHALANT ORAL 2 TIMES DAILY
Status: DISCONTINUED | OUTPATIENT
Start: 2022-07-14 | End: 2022-07-16 | Stop reason: HOSPADM

## 2022-07-14 RX ORDER — SODIUM CHLORIDE FOR INHALATION 0.9 %
3 VIAL, NEBULIZER (ML) INHALATION ONCE
Status: COMPLETED | OUTPATIENT
Start: 2022-07-14 | End: 2022-07-14

## 2022-07-14 RX ORDER — PANTOPRAZOLE SODIUM 40 MG/1
40 TABLET, DELAYED RELEASE ORAL DAILY
Status: DISCONTINUED | OUTPATIENT
Start: 2022-07-14 | End: 2022-07-16 | Stop reason: HOSPADM

## 2022-07-14 RX ORDER — METHYLPREDNISOLONE SODIUM SUCCINATE 125 MG/2ML
125 INJECTION, POWDER, LYOPHILIZED, FOR SOLUTION INTRAMUSCULAR; INTRAVENOUS ONCE
Status: COMPLETED | OUTPATIENT
Start: 2022-07-14 | End: 2022-07-14

## 2022-07-14 RX ORDER — AMLODIPINE BESYLATE 10 MG/1
10 TABLET ORAL DAILY
Status: DISCONTINUED | OUTPATIENT
Start: 2022-07-15 | End: 2022-07-16 | Stop reason: HOSPADM

## 2022-07-14 RX ORDER — METHYLPREDNISOLONE SODIUM SUCCINATE 40 MG/ML
40 INJECTION, POWDER, LYOPHILIZED, FOR SOLUTION INTRAMUSCULAR; INTRAVENOUS EVERY 8 HOURS SCHEDULED
Status: DISCONTINUED | OUTPATIENT
Start: 2022-07-14 | End: 2022-07-16 | Stop reason: HOSPADM

## 2022-07-14 RX ORDER — MIDODRINE HYDROCHLORIDE 5 MG/1
10 TABLET ORAL
Status: DISCONTINUED | OUTPATIENT
Start: 2022-07-15 | End: 2022-07-16 | Stop reason: HOSPADM

## 2022-07-14 RX ORDER — IPRATROPIUM BROMIDE AND ALBUTEROL SULFATE 2.5; .5 MG/3ML; MG/3ML
3 SOLUTION RESPIRATORY (INHALATION)
Status: DISCONTINUED | OUTPATIENT
Start: 2022-07-14 | End: 2022-07-16 | Stop reason: HOSPADM

## 2022-07-14 RX ORDER — BUSPIRONE HYDROCHLORIDE 10 MG/1
10 TABLET ORAL 3 TIMES DAILY
Status: DISCONTINUED | OUTPATIENT
Start: 2022-07-14 | End: 2022-07-16 | Stop reason: HOSPADM

## 2022-07-14 RX ORDER — GABAPENTIN 100 MG/1
200 CAPSULE ORAL 2 TIMES DAILY
Status: DISCONTINUED | OUTPATIENT
Start: 2022-07-14 | End: 2022-07-16 | Stop reason: HOSPADM

## 2022-07-14 RX ORDER — MIRTAZAPINE 15 MG/1
30 TABLET, FILM COATED ORAL
Status: DISCONTINUED | OUTPATIENT
Start: 2022-07-14 | End: 2022-07-16 | Stop reason: HOSPADM

## 2022-07-14 RX ADMIN — GABAPENTIN 200 MG: 100 CAPSULE ORAL at 20:16

## 2022-07-14 RX ADMIN — ISODIUM CHLORIDE 3 ML: 0.03 SOLUTION RESPIRATORY (INHALATION) at 14:50

## 2022-07-14 RX ADMIN — METOPROLOL TARTRATE 25 MG: 25 TABLET, FILM COATED ORAL at 20:16

## 2022-07-14 RX ADMIN — MIRTAZAPINE 30 MG: 15 TABLET, FILM COATED ORAL at 21:35

## 2022-07-14 RX ADMIN — IPRATROPIUM BROMIDE AND ALBUTEROL SULFATE 3 ML: 2.5; .5 SOLUTION RESPIRATORY (INHALATION) at 20:14

## 2022-07-14 RX ADMIN — BUDESONIDE 0.5 MG: 0.5 INHALANT ORAL at 20:14

## 2022-07-14 RX ADMIN — CARBIDOPA AND LEVODOPA 1 TABLET: 25; 100 TABLET ORAL at 21:35

## 2022-07-14 RX ADMIN — MECLIZINE HYDROCHLORIDE 25 MG: 25 TABLET ORAL at 13:28

## 2022-07-14 RX ADMIN — PANTOPRAZOLE SODIUM 40 MG: 40 TABLET, DELAYED RELEASE ORAL at 20:15

## 2022-07-14 RX ADMIN — BUSPIRONE HYDROCHLORIDE 10 MG: 10 TABLET ORAL at 20:15

## 2022-07-14 RX ADMIN — METHYLPREDNISOLONE SODIUM SUCCINATE 40 MG: 40 INJECTION, POWDER, FOR SOLUTION INTRAMUSCULAR; INTRAVENOUS at 21:35

## 2022-07-14 RX ADMIN — METHYLPREDNISOLONE SODIUM SUCCINATE 125 MG: 125 INJECTION, POWDER, FOR SOLUTION INTRAMUSCULAR; INTRAVENOUS at 11:54

## 2022-07-14 RX ADMIN — IPRATROPIUM BROMIDE 1 MG: 0.5 SOLUTION RESPIRATORY (INHALATION) at 14:50

## 2022-07-14 RX ADMIN — IPRATROPIUM BROMIDE AND ALBUTEROL SULFATE 3 ML: 2.5; .5 SOLUTION RESPIRATORY (INHALATION) at 11:54

## 2022-07-14 RX ADMIN — ALBUTEROL SULFATE 10 MG: 2.5 SOLUTION RESPIRATORY (INHALATION) at 14:50

## 2022-07-14 NOTE — ED NOTES
After ambulating about 10 feet, patient got increasingly tachypneic, wheezy, felt "he couldn't get any air and dizzy"  Had to be brought back to room by w/c  Sats stayed around 93% on 3L       Patricia Schaeffer RN  07/14/22 3622

## 2022-07-14 NOTE — ASSESSMENT & PLAN NOTE
Patient reports lightheadedness and generalized weakness  - Orthostatic vitals, EKG, monitor clinically, monitor fluid status

## 2022-07-14 NOTE — PROGRESS NOTES
Pt called office to cx appt for today  He stated that he has been very dizzy since yesterday and didn't feel comfortable driving since he lives by himself  I offered to call the ambulance but pt declined stating that he would call if he didn't feel better by today

## 2022-07-14 NOTE — ASSESSMENT & PLAN NOTE
Worsening    Patient states he received Zemaira infusion at his pulmonologist's office the day prior to arrival  Denies dyspnea after previous infusions

## 2022-07-14 NOTE — ASSESSMENT & PLAN NOTE
Worsening  Increased work of breathing compared to baseline  Reports dry cough, atypical for COPD presentation  Patient with history of COPD on 3L home O2, recently admitted for similar symptoms, presented with dyspnea on exertion, lightheadedness, and generalized weakness  Patient received respiratory treatments in the ED but prior to discharge planning, patient had increased work of breathing after ambulating a few steps  On admission interview, patient also had increased work of breathing after standing up to urinate into a urinal at bedside  ED: Albuterol 10 mg x 1, Ipratropium-Albuterol (Duoneb) 0 5-2 5mg/3mL x 1, Solumedrol 125 mg x 1  Plan:  - Duoneb - 0 5-2 5mg/3mL standing + PRN, Solumedrol - 40 mg q8h  - No indication for antibiotics: Chest x-ray shows no acute cardiopulmonary disease, no consolidation, afebrile  - ABG 7 4/32 1/06 2/20 2   - Patient reports improvement overnight, did not need PRN Arunonethiago    - Pulmonlogy consulted awaitng recs this AM

## 2022-07-14 NOTE — H&P
H&P Exam - Madai Travis 65 y o  male MRN: [id number]    Unit/Bed#: [address] [place] [address] Encounter: [id number]    Assessment:  65year old male with alpha-1-antitrypsin deficiency, COPD on 3L O2 at home, CAD, hypertension, malignancy, and multiple comorbidities presents with worsening dyspnea on exertion, lightheadness, and generalized weakness since being discharged for similar presentation 5 days ago  Patient is being admitted for observation under the supervision of Dr Milan Matson  Plan:    * Exertional dyspnea  Assessment & Plan  Worsening  Increased work of breathing compared to baseline  Reports dry cough, atypical for COPD presentation  Patient with history of COPD on 3L home O2, recently admitted for similar symptoms, presented with dyspnea on exertion, lightheadedness, and generalized weakness  Patient received respiratory treatments in the ED but prior to discharge planning, patient had increased work of breathing after ambulating a few steps  On admission interview, patient also had increased work of breathing after standing up to urinate into a urinal at bedside  ED: Albuterol 10 mg x 1, Ipratropium-Albuterol (Duoneb) 0 5-2 5mg/3mL x 1, Solumedrol 125 mg x 1  Plan:  - Duoneb - 0 5-2 5mg/3mL standing + PRN, Solumedrol - 40 mg q8h  - No indication for antibiotics: Chest x-ray shows no acute cardiopulmonary disease, no consolidation, afebrile  - ABG, continuous pulse ox  - Pulmonlogy consulted      Alpha-1-antitrypsin deficiency [facility])  Assessment & Plan  Worsening    Patient states he received Zemaira infusion at pulmonologist's office yesterday, denies dyspnea after previous infusions  He states he is able to drive his car at baseline but was unable to get to his next appointment due to worsening dyspnea      - Will check alpha-1-anitrypsin      Lightheadedness  Assessment & Plan  Patient reports lightheadedness and generalized weakness  - Orthostatic vitals, EKG, monitor clinically, monitor fluid status    Essential hypertension  Assessment & Plan  Continue home meds Amlodipine 10 mg, Metoprolol tartae 25 mg BID  Midodrine 10 mg TID per previous parameters  - Transient episode in ED of 219/104 on ambulation, diastolic improved to high 80's after rest  Monitor for hypertension urgency, vision changes, headaches  Centrilobular emphysema ([facility] [address] )  Assessment & Plan  Continued home inhalers Albuterol and nebulizers Budesonide, Formoterol, and Duonebs    Park[facility]'s disease ([facility] )  Assessment & Plan  Continue home Gabapentin 100 mg BID and Carvidopa-Levodopa  mg with meals    Anxiety  Assessment & Plan  Continue home med Buspar 10 mg    Depression, recurrent ([facility] )  Assessment & Plan  Continue home med: Mirtazapine 15 mg    Insomnia  Assessment & Plan  Continue home med: Mirtazapine    Chronic pulmonary embolism (HCC)  Assessment & Plan  Mild tachycardia and borderline tachypnea likely secondary to increased work of breathing rather than pulmonary embolism  Denies chest pain  BPH (benign prostatic hyperplasia)  Assessment & Plan  Continue home meds Finasteride 5 mg and Tamsulosin 0 4 mg     Gastroesophageal reflux disease  Assessment & Plan  Continue home med Protonix 40 mg      History of Present Illness   65year old male with alpha-1-antitrypsin deficiency, COPD on 3L O2 at home, CAD, hypertension, malignancy, and multiple comorbidities presents with worsening dyspnea on exertion, lightheadness, and generalized weakness since being discharged for similar presentation 5 days ago  He reports he saw his pulmonologist yesterday and received his Zemaira infusion for the alpha-1-antitrypsin deficiency  He denies having shortness of breath after receiving these infusions in the past  He states he is able to drive his car at baseline but states he could not wait until his next doctor's appointment due to worsening dyspnea  He reports associated dry cough  Denies chest pain, fever, or lower extremity swelling  Patient also reports lightheadedness described as the sensation of passing out, denies room spinning    Denies vision changes, headaches, nausea, vomiting, or urinary changes  Review of Systems   Constitutional: Positive for fatigue  Negative for fever  Eyes: Negative for visual disturbance  Respiratory: Positive for cough and shortness of breath  Negative for chest tightness  Cardiovascular: Negative for chest pain and leg swelling  Gastrointestinal: Negative for abdominal pain, nausea and vomiting  Genitourinary: Negative for dysuria and hematuria  Neurological: Positive for light-headedness  Historical Information   Past Medical History:   Diagnosis Date    Anesthesia complication     Difficult to wake up    Arthritis     BPH (benign prostatic hyperplasia)     urinary frequency    Cancer (HCC)     basal cell neck, face    CKD (chronic kidney disease) stage 2, GFR 60-89 ml/min 07/04/2022    [facility] pulmonary disease[facility])     COPD exacerbation (HCC) 12/29/2019    Coronary artery disease     Elevated PSA 10/25/2018    Full dentures     Hiatal hernia     History of methicillin resistant staphylococcus aureus (MRSA)     10/11/2018 MRSA (nares) positive    Hypertension     Irritable bowel syndrome     Kidney stone     at least 7 episodes    Liver disease     Alpha 1- enzyme deficiency - diagnosed 2002  has been on weekly replacement therapy since then    Pulmonary emphysema ([facility] [facility] )     1/25/15  FEV1 - 2 45 liters or 59% of predicted    RSV infection 12/2017    Wears glasses     for driving only     Past Surgical History:   Procedure Laterality Date    BACK SURGERY  2008    discectomy    CARDIAC CATHETERIZATION N/A 5/3/2022    Procedure: Cardiac catheterization both left and right heart catheterization with vaso dilatory trial;  Surgeon: Deana Jara MD;  Location: [place] [address] CATH LAB;   Service: Cardiology    CARDIAC CATHETERIZATION Left 5/3/2022 Procedure: Cardiac Left Heart Cath;  Surgeon: Norberto Valdez MD;  Location: [place] [address] CATH LAB; Service: Cardiology    CARDIAC CATHETERIZATION Left 5/3/2022    Procedure: Cardiac Left Ventriculogram;  Surgeon: Norberto Valdez MD;  Location: [place] [address] CATH LAB; Service: Cardiology    COLONOSCOPY      COLONOSCOPY N/A 3/10/2017    Procedure: [place]ulalia Goldmano;  Surgeon: Belia Armenta MD;  Location: [facility] GI LAB; Service:    [place]      removal of kidney stones    ESOPHAGOGASTRODUODENOSCOPY N/A 3/10/2017    Procedure: ESOPHAGOGASTRODUODENOSCOPY (EGD); Surgeon: Belia Armenta MD;  Location: [facility] GI LAB; Service:     LITHOTRIPSY      VA ESOPHAGOGASTRODUODENOSCOPY TRANSORAL DIAGNOSTIC N/A 2018    Procedure: ESOPHAGOGASTRODUODENOSCOPY (EGD); Surgeon: Belia Armenta MD;  Location: [facility] GI LAB; Service: Gastroenterology    TONSILLECTOMY      VEIN LIGATION AND STRIPPING Bilateral          Social History   Social History     Substance and Sexual Activity   Alcohol Use Never    Comment: stopped in      Social History     Substance and Sexual Activity   Drug Use Not Currently     Social History     Tobacco Use   Smoking Status Former Smoker    Packs/day: 1 00    Years: 60 00    Pack years: 60 00    Quit date: 2017    Years since quittin 8   Smokeless Tobacco Never Used   Tobacco Comment    quit in 2017     E-Cigarette Use: Never User     E-Cigarette/Vaping Substances    Nicotine No     THC No     CBD No     Flavoring No     Other No     Unknown No        Family History:   Family History   Problem Relation Age of Onset    Emphysema Mother         never smoked    Emphysema Father     Cancer Brother         colon    Colon cancer Brother     Ulcerative colitis Family     Liver disease Family        Meds/Allergies   PTA meds:   Prior to Admission Medications   Prescriptions Last Dose Informant Patient Reported? Taking?    OXYGEN-HELIUM IN 2022 at Unknown time Self Yes Yes   Sig: Inhale 2L at rest- 3L with activity   Ventolin  (90 Base) MCG/ACT inhaler Unknown at Unknown time Self No No   Sig: Inhale 2 puffs every 4 (four) hours as needed for wheezing   ZEMAIRA infusion 2022 at Unknown time Self Yes Yes   Sig: once a week    amLODIPine (NORVASC) 10 mg tablet 2022 at Unknown time Self No Yes   Sig: TAKE ONE TABLET BY MOUTH DAILY    budesonide (PULMICORT) 0 5 mg/2 mL nebulizer solution 2022 at Unknown time  No Yes   Sig: Take 2 mL (0 5 mg total) by nebulization in the morning and 2 mL (0 5 mg total) in the evening  busPIRone (BUSPAR) 10 mg tablet Unknown at Unknown time  No No   Sig: TAKE 1 TABLET BY MOUTH 3 TIMES DAILY   carbidopa-levodopa (SINEMET)  mg per tablet Past Week at Unknown time  No Yes   Sig: take 1 tablet by mouth prior to each meal   cholestyramine (QUESTRAN) 4 g packet Unknown at Unknown time  No No   Sig: Take 1 packet (4 g total) by mouth in the morning     finasteride (PROSCAR) 5 mg tablet 2022 at Unknown time Self No Yes   Sig: TAKE ONE TABLET BY MOUTH IN THE MORNING    fluticasone (FLONASE) 50 mcg/act nasal spray 2022 at Unknown time Self No Yes   Si sprays into each nostril daily   formoterol (PERFOROMIST) 20 MCG/2ML nebulizer solution 2022 at Unknown time  No Yes   Sig: Take 2 mL (20 mcg total) by nebulization every 12 (twelve) hours   gabapentin (NEURONTIN) 100 mg capsule 2022 at Unknown time  No Yes   Sig: Take 2 capsules (200 mg total) by mouth 2 (two) times a day   ipratropium-albuterol (DUO-NEB) 0 5-2 5 mg/3 mL nebulizer solution 2022 at Unknown time Self No Yes   Sig: Take 3 mL by nebulization 4 (four) times a day   metoprolol tartrate (LOPRESSOR) 25 mg tablet 2022 at Unknown time  No Yes   Sig: Take 1 tablet (25 mg total) by mouth every 12 (twelve) hours   midodrine (PROAMATINE) 10 MG tablet 2022 at Unknown time  No Yes   Sig: Take 1 tab three times daily AS NEEDED if SBP less than 100  Please hold medication if blood pressure is above 033 systolic    mirtazapine (REMERON) 15 mg tablet Past Week at Unknown time  No Yes   Sig: Take 2 tablets (30 mg total) by mouth daily at bedtime   pantoprazole (PROTONIX) 40 mg tablet 7/14/2022 at Unknown time  No Yes   Sig: TAKE ONE TABLET BY MOUTH TWICE DAILY   predniSONE 10 mg tablet 7/14/2022 at Unknown time  No Yes   Sig: Take 4 tablets (40 mg total) by mouth daily for 7 days, THEN 3 tablets (30 mg total) daily for 7 days, THEN 2 tablets (20 mg total) daily for 7 days, THEN 1 tablet (10 mg total) daily  tamsulosin (FLOMAX) 0 4 mg 7/14/2022 at Unknown time  No Yes   Sig: TAKE ONE CAPSULE BY MOUTH ONE TIME DAILY      Facility-Administered Medications: None     Allergies   Allergen Reactions    Chantix [Varenicline]      Caused prostate infection       Objective   First Vitals:   Blood Pressure: 169/82 (07/14/22 1137)  Pulse: 93 (07/14/22 1137)  Temperature: 97 9 °F (36 6 °C) (07/14/22 1137)  Temp Source: Tympanic (07/14/22 1137)  Respirations: 20 (07/14/22 1137)  Height: 6' 5" (195 6 cm) (07/14/22 1712)  Weight - Scale: 83 kg (183 lb) (07/14/22 1712)  SpO2: 95 % (07/14/22 1137)    Current Vitals:   Blood Pressure: 143/66 (07/14/22 1900)  Pulse: 79 (07/14/22 2018)  Temperature: 98 4 °F (36 9 °C) (07/14/22 1900)  Temp Source: Oral (07/14/22 1900)  Respirations: 21 (07/14/22 2018)  Height: 6' 5" (195 6 cm) (07/14/22 1712)  Weight - Scale: 83 kg (183 lb) (07/14/22 1712)  SpO2: 96 % (07/14/22 2018)    No intake or output data in the 24 hours ending 07/14/22 2024    Invasive Devices  Report    Peripheral Intravenous Line  Duration           Peripheral IV 07/14/22 Right Forearm <1 day                Physical Exam  Constitutional:       General: He is in acute distress  HENT:      Head: Normocephalic  Eyes:      Extraocular Movements: Extraocular movements intact  Cardiovascular:      Rate and Rhythm: Normal rate and regular rhythm        Heart sounds: No murmur heard  Pulmonary:      Effort: Respiratory distress present  Breath sounds: Wheezing present  Comments: Significantly dyspneic after minimal exertion (standing up at bedside to urinate in a urinal)  Chest:      Chest wall: No tenderness  Abdominal:      Tenderness: There is no abdominal tenderness  There is no guarding  Musculoskeletal:         General: No deformity  Cervical back: Normal range of motion  Skin:     General: Skin is warm and dry  Neurological:      Mental Status: He is alert           Lab Results:   Results for orders placed or performed during the hospital encounter of 07/14/22   COVID/FLU/RSV    Specimen: Nose; Nares   Result Value Ref Range    SARS-CoV-2 Negative Negative    INFLUENZA A PCR Negative Negative    INFLUENZA B PCR Negative Negative    RSV PCR Negative Negative   CBC and differential   Result Value Ref Range    WBC 10 09 4 31 - 10 16 Thousand/uL    RBC 4 08 3 88 - 5 62 Million/uL    Hemoglobin 13 3 12 0 - 17 0 g/dL    Hematocrit 39 4 36 5 - 49 3 %    MCV 97 82 - 98 fL    MCH 32 6 26 8 - 34 3 pg    MCHC 33 8 31 4 - 37 4 g/dL    RDW 15 8 (H) 11 6 - 15 1 %    MPV 9 7 8 9 - 12 7 fL    Platelets 130 (L) 347 - 390 Thousands/uL   Comprehensive metabolic panel   Result Value Ref Range    Sodium 138 136 - 145 mmol/L    Potassium 4 4 3 5 - 5 3 mmol/L    Chloride 104 100 - 108 mmol/L    CO2 24 21 - 32 mmol/L    ANION GAP 10 4 - 13 mmol/L    BUN 21 5 - 25 mg/dL    Creatinine 1 04 0 60 - 1 30 mg/dL    Glucose 138 65 - 140 mg/dL    Calcium 8 5 8 3 - 10 1 mg/dL    AST 38 5 - 45 U/L    ALT 81 (H) 12 - 78 U/L    Alkaline Phosphatase 62 46 - 116 U/L    Total Protein 6 3 (L) 6 4 - 8 2 g/dL    Albumin 3 5 3 5 - 5 0 g/dL    Total Bilirubin 1 06 (H) 0 20 - 1 00 mg/dL    eGFR 68 ml/min/1 73sq m   Blood gas, arterial   Result Value Ref Range    pH, Arterial 7 415 7 350 - 7 450    PH ART TC 7 416 7 350 - 7 450    pCO2, Arterial 32 2 (L) 36 0 - 44 0 mm Hg    PCO2 (TC) Arterial 32 1 (L) 36 0 - 44 0 mm Hg    pO2, Arterial 106 2 75 0 - 129 0 mm Hg    PO2 (TC) Arterial 105 6 75 0 - 129 0 mm Hg    HCO3, Arterial 20 2 (L) 22 0 - 28 0 mmol/L    Base Excess, Arterial -3 4 mmol/L    O2 Content, Arterial 19 1 16 0 - 23 0 mL/dL    O2 HGB,Arterial  97 3 (H) 94 0 - 97 0 %    SOURCE Radial, Left     FLORENCE TEST Yes     Temperature 98 4 Degrees Fehrenheit    Nasal Cannula 3    Manual Differential(PHLEBS Do Not Order)   Result Value Ref Range    Segmented % 75 43 - 75 %    Lymphocytes % 21 14 - 44 %    Monocytes % 3 (L) 4 - 12 %    Eosinophils, % 0 0 - 6 %    Basophils % 0 0 - 1 %    Metamyelocytes% 1 0 - 1 %    Absolute Neutrophils 7 57 1 85 - 7 62 Thousand/uL    Lymphocytes Absolute 2 12 0 60 - 4 47 Thousand/uL    Monocytes Absolute 0 30 0 00 - 1 22 Thousand/uL    Eosinophils Absolute 0 00 0 00 - 0 40 Thousand/uL    Basophils Absolute 0 00 0 00 - 0 10 Thousand/uL    Total Counted      RBC Morphology Normal     Platelet Estimate Borderline (A) Adequate     *Note: Due to a large number of results and/or encounters for the requested time period, some results have not been displayed  A complete set of results can be found in Results Review  Imaging:   XR chest 1 view portable   Final Result      No acute cardiopulmonary disease  Workstation performed: [id number]             EKG, Pathology, and Other Studies:     Code Status: Level 1 - Full Code  Advance Directive and Living Will:      Power of :    POLST:      Counseling / Coordination of Care: Total floor / unit time spent today 30 minutes

## 2022-07-14 NOTE — ASSESSMENT & PLAN NOTE
Continue home meds Amlodipine 10 mg, Metoprolol tartae 25 mg BID  Midodrine 10 mg TID per previous parameters  - Transient episode in ED of 219/104 on ambulation, diastolic improved to high 80's after rest  No further episodes of hypertension   - Adjusted home medication due to worsening progression of respiratory symptoms  Will stop Metoprolol and start chlorthalidone 25 mg daily

## 2022-07-15 VITALS
HEART RATE: 84 BPM | BODY MASS INDEX: 21.61 KG/M2 | TEMPERATURE: 98.7 F | WEIGHT: 183 LBS | DIASTOLIC BLOOD PRESSURE: 59 MMHG | RESPIRATION RATE: 20 BRPM | HEIGHT: 77 IN | OXYGEN SATURATION: 97 % | SYSTOLIC BLOOD PRESSURE: 126 MMHG

## 2022-07-15 PROBLEM — A41.9 SEPSIS WITHOUT ACUTE ORGAN DYSFUNCTION (HCC): Status: RESOLVED | Noted: 2022-07-15 | Resolved: 2022-07-15

## 2022-07-15 PROBLEM — R06.09 EXERTIONAL DYSPNEA: Status: RESOLVED | Noted: 2017-12-30 | Resolved: 2022-07-15

## 2022-07-15 PROBLEM — J18.9 PNEUMONIA DUE TO INFECTIOUS ORGANISM: Status: ACTIVE | Noted: 2022-07-15

## 2022-07-15 PROBLEM — R42 LIGHTHEADEDNESS: Status: RESOLVED | Noted: 2018-05-24 | Resolved: 2022-07-15

## 2022-07-15 PROBLEM — J18.9 PNEUMONIA DUE TO INFECTIOUS ORGANISM: Status: RESOLVED | Noted: 2022-07-15 | Resolved: 2022-07-15

## 2022-07-15 PROBLEM — R06.00 EXERTIONAL DYSPNEA: Status: RESOLVED | Noted: 2017-12-30 | Resolved: 2022-07-15

## 2022-07-15 PROBLEM — A41.9 SEPSIS WITHOUT ACUTE ORGAN DYSFUNCTION (HCC): Status: ACTIVE | Noted: 2022-07-15

## 2022-07-15 LAB
ANION GAP SERPL CALCULATED.3IONS-SCNC: 12 MMOL/L (ref 4–13)
BASOPHILS # BLD MANUAL: 0 THOUSAND/UL (ref 0–0.1)
BASOPHILS NFR MAR MANUAL: 0 % (ref 0–1)
BUN SERPL-MCNC: 23 MG/DL (ref 5–25)
CALCIUM SERPL-MCNC: 8.1 MG/DL (ref 8.3–10.1)
CHLORIDE SERPL-SCNC: 103 MMOL/L (ref 100–108)
CO2 SERPL-SCNC: 25 MMOL/L (ref 21–32)
CREAT SERPL-MCNC: 0.93 MG/DL (ref 0.6–1.3)
EOSINOPHIL # BLD MANUAL: 0 THOUSAND/UL (ref 0–0.4)
EOSINOPHIL NFR BLD MANUAL: 0 % (ref 0–6)
ERYTHROCYTE [DISTWIDTH] IN BLOOD BY AUTOMATED COUNT: 15.5 % (ref 11.6–15.1)
GFR SERPL CREATININE-BSD FRML MDRD: 78 ML/MIN/1.73SQ M
GLUCOSE P FAST SERPL-MCNC: 140 MG/DL (ref 65–99)
GLUCOSE SERPL-MCNC: 140 MG/DL (ref 65–140)
HCT VFR BLD AUTO: 36.4 % (ref 36.5–49.3)
HGB BLD-MCNC: 12.3 G/DL (ref 12–17)
LYMPHOCYTES # BLD AUTO: 17 % (ref 14–44)
LYMPHOCYTES # BLD AUTO: 2.19 THOUSAND/UL (ref 0.6–4.47)
MCH RBC QN AUTO: 32.5 PG (ref 26.8–34.3)
MCHC RBC AUTO-ENTMCNC: 33.8 G/DL (ref 31.4–37.4)
MCV RBC AUTO: 96 FL (ref 82–98)
MONOCYTES # BLD AUTO: 0.39 THOUSAND/UL (ref 0–1.22)
MONOCYTES NFR BLD: 3 % (ref 4–12)
MYELOCYTES NFR BLD MANUAL: 1 % (ref 0–1)
NEUTROPHILS # BLD MANUAL: 10.18 THOUSAND/UL (ref 1.85–7.62)
NEUTS SEG NFR BLD AUTO: 79 % (ref 43–75)
PLATELET # BLD AUTO: 117 THOUSANDS/UL (ref 149–390)
PLATELET BLD QL SMEAR: ABNORMAL
PMV BLD AUTO: 9.8 FL (ref 8.9–12.7)
POTASSIUM SERPL-SCNC: 4.1 MMOL/L (ref 3.5–5.3)
RBC # BLD AUTO: 3.78 MILLION/UL (ref 3.88–5.62)
RBC MORPH BLD: NORMAL
SODIUM SERPL-SCNC: 140 MMOL/L (ref 136–145)
WBC # BLD AUTO: 12.88 THOUSAND/UL (ref 4.31–10.16)

## 2022-07-15 PROCEDURE — 99253 IP/OBS CNSLTJ NEW/EST LOW 45: CPT | Performed by: INTERNAL MEDICINE

## 2022-07-15 PROCEDURE — 85007 BL SMEAR W/DIFF WBC COUNT: CPT

## 2022-07-15 PROCEDURE — 94760 N-INVAS EAR/PLS OXIMETRY 1: CPT

## 2022-07-15 PROCEDURE — 97163 PT EVAL HIGH COMPLEX 45 MIN: CPT

## 2022-07-15 PROCEDURE — 99218 PR INITIAL OBSERVATION CARE/DAY 30 MINUTES: CPT | Performed by: STUDENT IN AN ORGANIZED HEALTH CARE EDUCATION/TRAINING PROGRAM

## 2022-07-15 PROCEDURE — 80048 BASIC METABOLIC PNL TOTAL CA: CPT

## 2022-07-15 PROCEDURE — 97110 THERAPEUTIC EXERCISES: CPT

## 2022-07-15 PROCEDURE — 94640 AIRWAY INHALATION TREATMENT: CPT

## 2022-07-15 PROCEDURE — NC001 PR NO CHARGE: Performed by: STUDENT IN AN ORGANIZED HEALTH CARE EDUCATION/TRAINING PROGRAM

## 2022-07-15 PROCEDURE — NC001 PR NO CHARGE: Performed by: INTERNAL MEDICINE

## 2022-07-15 PROCEDURE — 99221 1ST HOSP IP/OBS SF/LOW 40: CPT | Performed by: INTERNAL MEDICINE

## 2022-07-15 PROCEDURE — 85027 COMPLETE CBC AUTOMATED: CPT

## 2022-07-15 PROCEDURE — 99223 1ST HOSP IP/OBS HIGH 75: CPT | Performed by: INTERNAL MEDICINE

## 2022-07-15 RX ORDER — CHLORTHALIDONE 25 MG/1
25 TABLET ORAL DAILY
Qty: 90 TABLET | Refills: 0 | Status: SHIPPED | OUTPATIENT
Start: 2022-07-15 | End: 2022-10-13

## 2022-07-15 RX ADMIN — METHYLPREDNISOLONE SODIUM SUCCINATE 40 MG: 40 INJECTION, POWDER, FOR SOLUTION INTRAMUSCULAR; INTRAVENOUS at 05:47

## 2022-07-15 RX ADMIN — METHYLPREDNISOLONE SODIUM SUCCINATE 40 MG: 40 INJECTION, POWDER, FOR SOLUTION INTRAMUSCULAR; INTRAVENOUS at 21:41

## 2022-07-15 RX ADMIN — PIPERACILLIN AND TAZOBACTAM 3.38 G: 3; .375 INJECTION, POWDER, LYOPHILIZED, FOR SOLUTION INTRAVENOUS; PARENTERAL at 12:57

## 2022-07-15 RX ADMIN — METOPROLOL TARTRATE 25 MG: 25 TABLET, FILM COATED ORAL at 21:42

## 2022-07-15 RX ADMIN — CARBIDOPA AND LEVODOPA 1 TABLET: 25; 100 TABLET ORAL at 09:39

## 2022-07-15 RX ADMIN — CARBIDOPA AND LEVODOPA 1 TABLET: 25; 100 TABLET ORAL at 21:42

## 2022-07-15 RX ADMIN — TAMSULOSIN HYDROCHLORIDE 0.4 MG: 0.4 CAPSULE ORAL at 09:39

## 2022-07-15 RX ADMIN — BUSPIRONE HYDROCHLORIDE 10 MG: 10 TABLET ORAL at 09:39

## 2022-07-15 RX ADMIN — METHYLPREDNISOLONE SODIUM SUCCINATE 40 MG: 40 INJECTION, POWDER, FOR SOLUTION INTRAMUSCULAR; INTRAVENOUS at 15:36

## 2022-07-15 RX ADMIN — FINASTERIDE 5 MG: 5 TABLET, FILM COATED ORAL at 09:39

## 2022-07-15 RX ADMIN — FLUTICASONE PROPIONATE 2 SPRAY: 50 SPRAY, METERED NASAL at 13:00

## 2022-07-15 RX ADMIN — IPRATROPIUM BROMIDE AND ALBUTEROL SULFATE 3 ML: 2.5; .5 SOLUTION RESPIRATORY (INHALATION) at 15:17

## 2022-07-15 RX ADMIN — BUSPIRONE HYDROCHLORIDE 10 MG: 10 TABLET ORAL at 21:42

## 2022-07-15 RX ADMIN — GABAPENTIN 200 MG: 100 CAPSULE ORAL at 09:39

## 2022-07-15 RX ADMIN — ENOXAPARIN SODIUM 40 MG: 40 INJECTION SUBCUTANEOUS at 09:38

## 2022-07-15 RX ADMIN — BUDESONIDE 0.5 MG: 0.5 INHALANT ORAL at 07:20

## 2022-07-15 RX ADMIN — CARBIDOPA AND LEVODOPA 1 TABLET: 25; 100 TABLET ORAL at 18:28

## 2022-07-15 RX ADMIN — BUSPIRONE HYDROCHLORIDE 10 MG: 10 TABLET ORAL at 15:36

## 2022-07-15 RX ADMIN — AMLODIPINE BESYLATE 10 MG: 10 TABLET ORAL at 09:39

## 2022-07-15 RX ADMIN — FORMOTEROL FUMARATE DIHYDRATE 20 MCG: 20 SOLUTION RESPIRATORY (INHALATION) at 07:21

## 2022-07-15 RX ADMIN — PANTOPRAZOLE SODIUM 40 MG: 40 TABLET, DELAYED RELEASE ORAL at 09:39

## 2022-07-15 RX ADMIN — IPRATROPIUM BROMIDE AND ALBUTEROL SULFATE 3 ML: 2.5; .5 SOLUTION RESPIRATORY (INHALATION) at 11:19

## 2022-07-15 RX ADMIN — MIDODRINE HYDROCHLORIDE 10 MG: 5 TABLET ORAL at 12:56

## 2022-07-15 RX ADMIN — MIRTAZAPINE 30 MG: 15 TABLET, FILM COATED ORAL at 21:42

## 2022-07-15 RX ADMIN — GABAPENTIN 200 MG: 100 CAPSULE ORAL at 18:28

## 2022-07-15 RX ADMIN — CARBIDOPA AND LEVODOPA 1 TABLET: 25; 100 TABLET ORAL at 12:56

## 2022-07-15 RX ADMIN — METOPROLOL TARTRATE 25 MG: 25 TABLET, FILM COATED ORAL at 09:39

## 2022-07-15 RX ADMIN — IPRATROPIUM BROMIDE AND ALBUTEROL SULFATE 3 ML: 2.5; .5 SOLUTION RESPIRATORY (INHALATION) at 07:20

## 2022-07-15 RX ADMIN — MIDODRINE HYDROCHLORIDE 10 MG: 5 TABLET ORAL at 15:36

## 2022-07-15 NOTE — CASE MANAGEMENT
Case Management Discharge Planning Note    Patient name John Bertrand  Location 3 [facility] 328/3 [facility] MRN [id number]  : 1945 Date 7/15/2022       Current Admission Date: 2022  Current Admission Diagnosis:Sepsis without acute organ dysfunction [facility])   Patient Active Problem List    Diagnosis Date Noted    Sepsis without acute organ dysfunction ([address] ) 07/15/2022    Pneumonia due to infectious organism 07/15/2022    Panlobular emphysema ([address] )     H/O cardiac catheterization 2022    Chest heaviness 2022    Hypertensive urgency 2022    Cognitive impairment 2021    Parkinson's disease ([address] ) 2021    Anxiety 2021    Vitamin D deficiency disease 2021    Avascular necrosis of hip, right ([address] ) 2021    Depression, recurrent ([address] ) 2021    Palliative care patient 11/10/2020    Orthostatic hypotension with spine hypertension 2020    Ambulatory dysfunction 2019    Insomnia 2019    Chronic venous insufficiency 2019    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 ([address] ) 2019    Lightheadedness 2018    Centrilobular emphysema ([address] ) 2018    CLAYTON (acute kidney injury) ([address] ) 2018    Chronic respiratory failure with hypoxia ([address] ) 2018    Chronic pulmonary embolism ([address] ) 2018    Former smoker 2018    Liver disease     Alpha-1-antitrypsin deficiency ([address] ) 2017    Gastroesophageal reflux disease 2017    Essential hypertension 2017    Exertional dyspnea 2017    BPH (benign prostatic hyperplasia) 2017    Peripheral neuropathy 2017    Allergic rhinitis 2016    Cataracts, both eyes 2015    Chronic diarrhea  2014    Benign colon polyp 2014      LOS (days): 0  Geometric Mean LOS (GMLOS) (days):   Days to GMLOS:     OBJECTIVE:      Current admission status: Observation   Preferred Pharmacy:   [facility] [address] [place] - [address]  [address]  [facility]  Phone: [phone] Fax: [phone]    [address] [address]  Phone: [phone] Fax: [phone]    [address], [address]  [address]  [facility] [address]  Phone: [phone] Fax: [phone]    Primary Care Provider: Karyn Cordova MD    Primary Insurance: MEDICARE  Secondary Insurance: [employer] [place]    DISCHARGE DETAILS:    Discharge planning discussed with[de-identified] Patient, Dtr     CM contacted family/caregiver?: Yes    Contacts  Patient Contacts: Santosh Waldrop  Relationship to Patient[de-identified] Family  Contact Method: Phone  Phone Number: [phone]  Reason/Outcome: Discharge Planning, Continuity of Care, Emergency Contact    Other Referral/Resources/Interventions Provided:  Interventions: Transportation    Would you like to participate in our [address] service program?  : No - Declined    Treatment Team Recommendation: Short Term Rehab  Discharge Destination Plan[de-identified] Short Term Rehab  Transport at Discharge : BLS Ambulance     Number/Name of Dispatcher: Brionna Ocampo and Unit #): TBD  ETA of Transport (Date): 07/15/22  ETA of Transport (Time):  (TBD)    Accepting Facility Name, [facility] : [address]  Receiving Facility/Agency Phone Number: [phone]    S transport requested for transport to [address] [address]  Pickup time pending  Medical necessity form completed and provided to nursing  Dtr Isai Ochoa is aware of discharge and confirmed she just dropped off belongings to the building  SW will update her with pickup time once known  [facility] Resident working on discharge orders  Update: [place] to transport at [facility]  SNF, Attending, patient, nurse, and dtr made aware

## 2022-07-15 NOTE — PHYSICAL THERAPY NOTE
PHYSICAL THERAPY EVALUATION/TREATMENT     07/15/22 1410   Note Type   Note type Evaluation   Pain Assessment   Pain Assessment Tool 0-10   Pain Score No Pain   Restrictions/Precautions   Other Precautions Chair Alarm; Bed Alarm; Fall Risk;O2   Home Living   Type of [address] One level;Stairs to enter with rails  (One step to enter)   [address] [place] chair   Home Equipment Walker;Cane  (Rollator)   Additional Comments Patient reports using a cane in holding furniture at home prior to admission  Patient states having Rollator walker in his car   Prior Function   Lives With [address]   IADLs Needs assistance   Falls in the last 6 months 1 to 4   Comments Patient states having family completing food shopping, having a cleaning woman every 2 weeks  General   Additional Pertinent History Chart reviewed, patient admitted with sepsis  Patient now presents as generally weak deconditioned and with extreme shortness of breath on limited activity on 3 L of oxygen   Family/Caregiver Present No   Cognition   Overall Cognitive Status WFL   Arousal/Participation Cooperative   Orientation Level Oriented X4   Following Commands Follows multistep commands with increased time or repetition   Comments Patient distracted at times with shortness of breath with limited activity   Subjective   Subjective No pain noted   RLE Assessment   RLE Assessment   (ROM WFL, strength 3+/5)   LLE Assessment   LLE Assessment   (ROM WFL, strength 3+/ 5)   Coordination   Movements are Fluid and Coordinated 0   Bed Mobility   Supine to Sit 7  Independent   Sit to Supine 7  Independent   Transfers   Sit to Stand 4  Minimal assistance   Additional items Assist x 1   Stand to Sit 4  Minimal assistance   Additional items Assist x 1;Verbal cues   Ambulation/Elevation   Gait Assistance 3  Moderate assist   Additional items Assist x 1;Verbal cues; Tactile cues   Assistive Device Rolling walker   Distance 10 ft with change in direction, walking with flexed knees at times shortened stride length and narrow base of support   Balance   Static Sitting Fair +   Dynamic Sitting Fair   Static Standing Fair -   Dynamic Standing Poor +   Ambulatory Poor +   Activity Tolerance   Activity Tolerance Patient limited by fatigue;Treatment limited secondary to medical complications (Comment)  (Limited by shortness of breath)   Nurse Made Aware yes   Assessment   Prognosis Good   Problem List Decreased strength;Decreased range of motion;Decreased endurance; Impaired balance;Decreased mobility; Decreased coordination  (Shortness of breath)   Assessment Patient seen for Physical Therapy evaluation  Patient admitted with Sepsis without acute organ dysfunction ([facility] [address] )  Comorbidities affecting patient's physical performance include:alpha-1-antitrypsin deficiency, COPD on 3L O2 at home, CAD, hypertension, malignancy, and multiple comorbidities presents with worsening dyspnea on exertion, lightheadness, and generalized weakness    Personal factors affecting patient at time of initial evaluation include: ambulating with assistive device, inability to ambulate household distances, inability to navigate community distances, inability to navigate level surfaces without external assistance, inability to perform dynamic tasks in community, limited home support, positive fall history, inability to perform physical activity, inability to perform ADLS and inability to perform IADLS   Prior to admission, patient was independent with functional mobility with cane and "furniture", independent with ADLS, requiring assist for IADLS and ambulating household distance    Please find objective findings from Physical Therapy assessment regarding body systems outlined above with impairments and limitations including weakness, decreased ROM, impaired balance, decreased endurance, impaired coordination, gait deviations, decreased activity tolerance, decreased functional mobility tolerance, fall risk and SOB upon exertion  The Barthel Index was used as a functional outcome tool presenting with a score of Barthel Index Score: 50 today indicating marked limitations of functional mobility and ADLS  Patient's clinical presentation is currently unstable/unpredictable as seen in patient's presentation of vital sign response, changing level of pain, increased fall risk, new onset of impairment of functional mobility, decreased endurance and new onset of weakness  Pt would benefit from continued Physical Therapy treatment to address deficits as defined above and maximize level of functional mobility  As demonstrated by objective findings, the assigned level of complexity for this evaluation is high  The patient's [facility] Basic Mobility Inpatient Short Form Raw Score is 16  A Raw score of less than or equal to 16 suggests the patient may benefit from discharge to post-acute rehabilitation services  Please also refer to the recommendation of the Physical Therapist for safe discharge planning  Goals   Patient Goals To breathe better and get stronger   STG Expiration Date 07/22/22   Short Term Goal #1 Transfers and gait with roller walker with supervision   Short Term Goal #2 Gait endurance to functional household distances   LTG Expiration Date 07/29/22   Long Term Goal #1 And energy conservation education   Long Term Goal #2 Independent transfers and gait with Rollator for improved energy conservation in home environment   Plan   Treatment/Interventions ADL retraining;Functional transfer training;LE strengthening/ROM; Therapeutic exercise; Endurance training;Patient/family training;Equipment eval/education;Gait training; Compensatory technique education   PT Frequency Other (Comment)  (5 times per week)   Recommendation   PT Discharge Recommendation Post acute rehabilitation services   [facility] Basic Mobility Inpatient   Turning in Bed Without Bedrails 4   Lying on Back to Sitting on Edge of Flat Bed 4   Moving Bed to Chair 2   Standing Up From Chair 3   Walk in Room 2   Climb 3-5 Stairs 1   Basic Mobility Inpatient Raw Score 16   Basic Mobility Standardized Score 38 32   Highest Level Of Mobility   -[facility] Goal 5: Stand one or more mins   -[facility] Achieved 6: Walk 10 steps or more   Barthel Index   Feeding 10   Bathing 0   Grooming Score 0   Dressing Score 5   Bladder Score 10   Bowels Score 10   Toilet Use Score 5   Transfers (Bed/Chair) Score 10   Mobility (Level Surface) Score 0   Stairs Score 0   Barthel Index Score 50   Additional Treatment Session   Start Time 1355   End Time 1410   Treatment Assessment s: patient states being very SOB and worsening recently at home O: BLE exercise completed as listed below A:  Patient cooperative although with limited endurance with shortness of breath and will benefit from continued physical therapy with progression as tolerated   Exercises   Hamstring Stretch Sitting;5 reps;PROM; Bilateral   Quad Sets Supine;10 reps;Bilateral   Glute Sets Sitting;5 reps;Bilateral   Hip Flexion Sitting;10 reps;Bilateral   Hip Abduction Sitting;10 reps;Bilateral   Knee AROM Long Arc Quad Sitting;10 reps;Bilateral   Ankle Pumps Sitting;10 reps;Bilateral   Licensure   [place] License Number  Titus Jossue PT [id number]

## 2022-07-15 NOTE — PLAN OF CARE
Problem: Potential for Falls  Goal: Patient will remain free of falls  Description: INTERVENTIONS:  - Educate patient/family on patient safety including physical limitations  - Instruct patient to call for assistance with activity   - Consult OT/PT to assist with strengthening/mobility   - Keep Call bell within reach  - Keep bed low and locked with side rails adjusted as appropriate  - Keep care items and personal belongings within reach  - Initiate and maintain comfort rounds  - Make Fall Risk Sign visible to staff  - Offer Toileting every 2 Hours, in advance of need  - Initiate/Maintain bed alarm  - Obtain necessary fall risk management equipment:   Problem: MOBILITY - ADULT  Goal: Maintain or return to baseline ADL function  Description: INTERVENTIONS:  -  Assess patient's ability to carry out ADLs; assess patient's baseline for ADL function and identify physical deficits which impact ability to perform ADLs (bathing, care of mouth/teeth, toileting, grooming, dressing, etc )  - Assess/evaluate cause of self-care deficits   - Assess range of motion  - Assess patient's mobility; develop plan if impaired  - Assess patient's need for assistive devices and provide as appropriate  - Encourage maximum independence but intervene and supervise when necessary  - Involve family in performance of ADLs  - Assess for home care needs following discharge   - Consider OT consult to assist with ADL evaluation and planning for discharge  - Provide patient education as appropriate  Outcome: Progressing  Goal: Maintains/Returns to pre admission functional level  Description: INTERVENTIONS:  - Perform BMAT or MOVE assessment daily    - Set and communicate daily mobility goal to care team and patient/family/caregiver  - Collaborate with rehabilitation services on mobility goals if consulted  - Perform Range of Motion 3 times a day  - Reposition patient every 2 hours    - Dangle patient 3 times a day  - Stand patient 3 times a day  - Ambulate patient 3 times a day  - Out of bed to chair 3 times a day   - Out of bed for meals 3  Problem: RESPIRATORY - ADULT  Goal: Achieves optimal ventilation and oxygenation  Description: INTERVENTIONS:  - Assess for changes in respiratory status  - Assess for changes in mentation and behavior  - Position to facilitate oxygenation and minimize respiratory effort  - Oxygen administered by appropriate delivery if ordered  - Initiate smoking cessation education as indicated  - Encourage broncho-pulmonary hygiene including cough, deep breathe, Incentive Spirometry  - Assess the need for suctioning and aspirate as needed  - Assess and instruct to report SOB or any respiratory difficulty  - Respiratory Therapy support as indicated  Outcome: Progressing    times a day  - Out of bed for toileting  - Record patient progress and toleration of activity level   Outcome: Progressing     - Apply yellow socks and bracelet for high fall risk patients  - Consider moving patient to room near nurses station  Outcome: Progressing

## 2022-07-15 NOTE — CASE MANAGEMENT
Case Management Assessment & Discharge Planning Note    Patient name Navya Urias  Location 3 [place] 328/3 [facility]- MRN [id number]  : 1945 Date 7/15/2022       Current Admission Date: 2022  Current Admission Diagnosis:Exertional dyspnea   Patient Active Problem List    Diagnosis Date Noted    H/O cardiac catheterization 2022    Chest heaviness 2022    Hypertensive urgency 2022    Cognitive impairment 2021    Parkinson's disease ([address] ) 2021    Anxiety 2021    Vitamin D deficiency disease 2021    Avascular necrosis of hip, right ([address] ) 2021    Depression, recurrent ([address] ) 2021    Palliative care patient 11/10/2020    Orthostatic hypotension with spine hypertension 2020    Ambulatory dysfunction 2019    Insomnia 2019    Chronic venous insufficiency 2019    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 ([address] ) 2019    Lightheadedness 2018    Centrilobular emphysema ([address] ) 2018    CLAYTON (acute kidney injury) ([address] ) 2018    Chronic respiratory failure with hypoxia ([address] ) 2018    Chronic pulmonary embolism ([address] ) 2018    Former smoker 2018    Liver disease     Alpha-1-antitrypsin deficiency ([address] ) 2017    Gastroesophageal reflux disease 2017    Essential hypertension 2017    Exertional dyspnea 2017    BPH (benign prostatic hyperplasia) 2017    Peripheral neuropathy 2017    Allergic rhinitis 2016    Cataracts, both eyes 2015    Chronic diarrhea  2014    Benign colon polyp 2014      LOS (days): 0  Geometric Mean LOS (GMLOS) (days):   Days to GMLOS:     OBJECTIVE:     Current admission status: Observation     Preferred Pharmacy:   [address], [address] [place] [address] [address]   [address] [place] [address]  Phone: [phone] Fax: 19 [facility] [place], [place] - [address] [facility]  [address] [facility] [address]  Phone: [phone] Fax: [phone]    [address], [address]  [address] [facility]  [place] [address]  Phone: [phone] Fax: [phone]    Primary Care Provider: Dodie Wakefield MD    Primary Insurance: MEDICARE  Secondary Insurance: [facility]    ASSESSMENT:  Rik Becerra, [address] - Daughter   Primary Phone: [phone] (Mobile)               Advance Directives  Does patient have a [address] [facility] [address]?: Yes  Primary Contact: Shannen Belcher    Obs Notice Signed: 07/15/22 (MOON reviewed with patient  Patient gave verbal understanding  Copy placed in scan bin )    Readmission Root Cause  30 Day Readmission: Yes  Who directed you to return to the hospital?: Self  Did you understand whom to contact if you had questions or problems?: Yes  Did you get your prescriptions before you left the hospital?: No  Reason[de-identified] Not preferred pharmacy  Were you able to get your prescriptions filled when you left the hospital?: Yes  Did you take your medications as prescribed?: Yes  Were you able to get to your follow-up appointments?: No  Reason[de-identified] Compliance (Pt canceled appt  scheduled for 7/11)  During previous admission, was a post-acute recommendation made?: Yes  What post-acute resources were offered?: [address] (Referral sent to [facility] [place])  Patient was readmitted due to: Exertional Dyspnea  Action Plan: Medical mgmt, SNF placement    Patient Information  Admitted from[de-identified] Home  Mental Status: Alert  During Assessment patient was accompanied by: Not accompanied during assessment  Assessment information provided by[de-identified] Patient  Primary Caregiver: Self  Support Systems: Daughter  [place] of Residence: [address] [facility] do you live in?: [place], [address] entry access options   Select all that apply : Stairs  Number of steps to enter home : 1  Type of Current Residence: [place]  In the last 12 months, was there a time when you were not able to pay the mortgage or rent on time?: No  In the last 12 months, how many places have you lived?: 1  In the last 12 months, was there a time when you did not have a steady place to sleep or slept in a shelter (including now)?: No  Homeless/housing insecurity resource given?: N/A  Living Arrangements: Lives Alone    Activities of Daily Living Prior to Admission  Functional Status: Independent  Completes ADLs independently?: Yes  Ambulates independently?: Yes  Does patient use assisted devices?: Yes  Assisted Devices (DME) used: Home Oxygen concentrator, Portable Oxygen concentrator, [address] [facility] Name (respiratory supplies): [employer]  O2 Rate(s): 3LNC  Does patient currently own DME?: Yes  What DME does the patient currently own?: Home Oxygen concentrator, Portable Oxygen concentrator, Rollator, Walker, Straight Cane, Shower Chair, Nebulizer  Does patient have a history of Outpatient Therapy (PT/OT)?: No  Does the patient have a history of Short-Term Rehab?: Yes (([facility])  Does patient have a history of HHC?: Yes  Does patient currently have [facility]?: Yes    Current Home Health Care  Type of Current Home Care Services: Nurse visit, Home PT  [address][de-identified] [facility] Provider[de-identified] PCP    Patient Information Continued  Income Source: Pension/long term  Does patient have prescription coverage?: Yes  Within the past 12 months, you worried that your food would run out before you got the money to buy more : Never true  Within the past 12 months, the food you bought just didn't last and you didn't have money to get more : Never true  Food insecurity resource given?: N/A  Does patient receive dialysis treatments?: No  Does patient have a history of substance abuse?: No  Does patient have a history of Mental Health Diagnosis?: Yes (Hx of anxiety/depression managed by PCP)  Has patient received inpatient treatment related to mental health in the last 2 years?: No     Means of Transportation  Means of Transport to Appts[de-identified] Drives Self (or family assistance)  In the past 12 months, has lack of transportation kept you from medical appointments or from getting medications?: No  In the past 12 months, has lack of transportation kept you from meetings, work, or from getting things needed for daily living?: No  Was application for public transport provided?: N/A    DISCHARGE DETAILS:    Discharge planning discussed with[de-identified] Patient, Dtr  Freedom of Choice: Yes  Comments - Freedom of Choice: SNF listing reviewed with patient  Patient's #1 choice is [facility] and #2 [facility]  CM contacted family/caregiver?: Yes  Were Treatment Team discharge recommendations reviewed with patient/caregiver?: Yes  Did patient/caregiver verbalize understanding of patient care needs?: Yes  Were patient/caregiver advised of the risks associated with not following Treatment Team discharge recommendations?: Yes    Contacts  Patient Contacts: Ilsa Bro  Relationship to Patient[de-identified] Family  Contact Method: In Person  Reason/Outcome: Discharge Planning, Continuity of Care, Emergency [address]         Is the patient interested in [facility] at discharge?: No    DME Referral Provided  Referral made for DME?: No    Other Referral/Resources/Interventions Provided:  Interventions: Short Term Rehab  Referral Comments: Aidin referrals sent to [facility]  Call made to liaison Jennifer Gonzales  She will follow-up with SW on bed availability      Would you like to participate in our [address] service program?  : No - Declined    Treatment Team Recommendation: Short Term Rehab  Discharge Destination Plan[de-identified] Short Term Rehab  Transport at Discharge : S Ambulance      Update: Patient was offered bed today at [facility] spoke with patient and his dtr Elvira Granado regarding  Both agreeable to pursue admission  Per Attending, they will need to confirm medical clearance from pulmonary before patient can discharge today  Glory Galicia also requesting PT evaluation  PT consulted and will see patient today

## 2022-07-15 NOTE — CONSULTS
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 extending into posterior aspect left lower lobe  6  Thromobocytopenia    Plan:   Cont Duoneb, Budesonide, Performist  Cont Solumedrol  Sputum cx; would suggest IV zosyn (given recent hospitalization and Alpha 1 AT deficiency: high risk for pseudomonas infection)  Given patient overall poor general health condition and severe COPD; not a surgical candidate for thrombectomy   VTE PPX with enoxaparin: monitor platelet count: if further decline than consider obtain HIT panel and DC LMWH               History of Present Illness :     Physician Requesting Consult: Karol Hussein MD  Reason for Consult / Principal Problem:COPD exacerbation     HPI: Aislinn Crowder is a 65y o  year old male who presents with dyspnea  [place] has  [id number] deficiency on weekly supplemental anti protease therapy but not able to receive due to repeated hospitalization, COPD on 3L O2 at home, CAD, hypertension, mural thrombus in pulmonary artery and multiple comorbidities presents with worsening dyspnea on exertion, lightheadness, and generalized weakness since being discharged for similar presentation 5 days ago  Inpatient consult to Pulmonology  Consult performed by: Renaldo Chaney MD  Consult ordered by: Ivania Chaudhry MD          Review of Systems   Constitutional: Positive for appetite change and fatigue  Negative for activity change, chills and diaphoresis  HENT: Negative for congestion, dental problem and drooling      Respiratory: Positive for cough, chest tightness and shortness of breath  Negative for apnea, choking and stridor  Cardiovascular: Positive for leg swelling  Negative for chest pain  Gastrointestinal: Negative for abdominal distention, abdominal pain, anal bleeding and blood in stool  Endocrine: Negative for cold intolerance and heat intolerance  Genitourinary: Negative for difficulty urinating and enuresis  Neurological: Negative for dizziness  Hematological: Negative for adenopathy  Psychiatric/Behavioral: Negative for agitation  Historical Information   Past Medical History:   Diagnosis Date    Anesthesia complication     Difficult to wake up    Arthritis     BPH (benign prostatic hyperplasia)     urinary frequency    Cancer (HCC)     basal cell neck, face    CKD (chronic kidney disease) stage 2, GFR 60-89 ml/min 07/04/2022    [facility]ch[facility]     COPD exacerbation ([facility] 12/29/2019    Coronary artery disease     Elevated PSA 10/25/2018    Full dentures     Hiatal hernia     History of methicillin resistant staphylococcus aureus (MRSA)     10/11/2018 MRSA (nares) positive    Hypertension     Irritable bowel syndrome     Kidney stone     at least 7 episodes    Liver disease     Alpha 1- enzyme deficiency - diagnosed 2002  has been on weekly replacement therapy since then    Pulmonary emphysema ([facility] [address] )     1/25/15  FEV1 - 2 45 liters or 59% of predicted    RSV infection 12/2017    Wears glasses     for driving only     Past Surgical History:   Procedure Laterality Date    BACK SURGERY  2008    discectomy    CARDIAC CATHETERIZATION N/A 5/3/2022    Procedure: Cardiac catheterization both left and right heart catheterization with vaso dilatory trial;  Surgeon: Renetta Luis MD;  Location: [place] [address] CATH LAB;   Service: Cardiology    CARDIAC CATHETERIZATION Left 5/3/2022    Procedure: Cardiac Left Heart Cath;  Surgeon: Renetta Luis MD;  Location: [place] [address] CATH LAB; Service: Cardiology    CARDIAC CATHETERIZATION Left 5/3/2022    Procedure: Cardiac Left Ventriculogram;  Surgeon: George Vidal MD;  Location: [place] [address] CATH LAB; Service: Cardiology    COLONOSCOPY      COLONOSCOPY N/A 3/10/2017    Procedure: Jovanni Hoot;  Surgeon: Pearl Hare MD;  Location: [facility] GI LAB; Service:    Carry Josué      removal of kidney stones    ESOPHAGOGASTRODUODENOSCOPY N/A 3/10/2017    Procedure: ESOPHAGOGASTRODUODENOSCOPY (EGD); Surgeon: Pearl Hare MD;  Location: [facility] GI LAB; Service:     LITHOTRIPSY      CA ESOPHAGOGASTRODUODENOSCOPY TRANSORAL DIAGNOSTIC N/A 2018    Procedure: ESOPHAGOGASTRODUODENOSCOPY (EGD); Surgeon: Pearl Hare MD;  Location: [facility] GI LAB; Service: Gastroenterology    TONSILLECTOMY      VEIN LIGATION AND STRIPPING Bilateral          Social History   Social History     Substance and Sexual Activity   Alcohol Use Never    Comment: stopped in      Social History     Substance and Sexual Activity   Drug Use Not Currently     E-Cigarette/Vaping    E-Cigarette Use Never User      E-Cigarette/Vaping Substances    Nicotine No     THC No     CBD No     Flavoring No     Other No     Unknown No      Social History     Tobacco Use   Smoking Status Former Smoker    Packs/day: 1 00    Years: 60 00    Pack years: 60 00    Quit date: 2017    Years since quittin 8   Smokeless Tobacco Never Used   Tobacco Comment    quit in 2017         Family History: non-contributory    Meds/Allergies   all current active meds have been reviewed    Allergies   Allergen Reactions    Chantix [Varenicline]      Caused prostate infection       Objective   Vitals: Blood pressure 159/73, pulse 87, temperature 97 9 °F (36 6 °C), temperature source Oral, resp  rate 19, height 6' 5" (1 956 m), weight 83 kg (183 lb), SpO2 97 %  ,Body mass index is 21 7 kg/m²    No intake or output data in the 24 hours ending 07/15/22 1033  Invasive Devices  Report    Peripheral Intravenous Line  Duration           Peripheral IV 07/14/22 Right Forearm <1 day                Physical Exam  Constitutional:       General: He is not in acute distress  Appearance: He is well-developed  HENT:      Head: Normocephalic and atraumatic  Mouth/Throat:      Mouth: Mucous membranes are moist    Neck:      Thyroid: No thyromegaly  Cardiovascular:      Rate and Rhythm: Normal rate and regular rhythm  Pulmonary:      Effort: No accessory muscle usage  Breath sounds: Examination of the right-lower field reveals decreased breath sounds  Examination of the left-lower field reveals decreased breath sounds  Decreased breath sounds and rhonchi present  No wheezing  Comments: Decreased breath sound, few scattered rhonchi+  Chest:      Chest wall: No mass  Musculoskeletal:      Right lower leg: Edema (trace) present  Skin:     General: Skin is warm  Neurological:      General: No focal deficit present  Mental Status: He is alert and oriented to person, place, and time     Psychiatric:         Mood and Affect: Mood normal          Lab Results:   ABG:   Lab Results   Component Value Date    PHART 7 415 07/14/2022    TIF4DGK 32 2 (L) 07/14/2022    PO2ART 106 2 07/14/2022    ODP3OMM 20 2 (L) 07/14/2022    BEART -3 4 07/14/2022    SOURCE Radial, Left 07/14/2022   , CBC:   Lab Results   Component Value Date    WBC 12 88 (H) 07/15/2022    HGB 12 3 07/15/2022    HCT 36 4 (L) 07/15/2022    MCV 96 07/15/2022     (L) 07/15/2022    MCH 32 5 07/15/2022    MCHC 33 8 07/15/2022    RDW 15 5 (H) 07/15/2022    MPV 9 8 07/15/2022   , CMP:   Lab Results   Component Value Date    SODIUM 140 07/15/2022    K 4 1 07/15/2022     07/15/2022    CO2 25 07/15/2022    BUN 23 07/15/2022    CREATININE 0 93 07/15/2022    CALCIUM 8 1 (L) 07/15/2022    AST 38 07/14/2022    ALT 81 (H) 07/14/2022    ALKPHOS 62 07/14/2022    EGFR 78 07/15/2022     Imaging Studies: I have personally reviewed pertinent films in PACS      VTE Prophylaxis: Enoxaparin (Lovenox)    Code Status: Level 1 - Full Erick Vincent MD

## 2022-07-15 NOTE — ED PROVIDER NOTES
History  Chief Complaint   Patient presents with    Shortness of Breath     Pt c/o sob for a couple of days     HPI  Patient is a 65year old male pmh COPD on home 3L, emphysema, CKD presenting for evaluation of several days of weakness, cough, shortness of breath, wheezing  Patient feels like he is having a COPD exacerbation  Patient denies fevers, chills, rigors, headache, sore throat, myalgia, nausea, vomiting, abdominal pain, chest pain, palpitations  Patient states that he has been having some lightheadedness, particularly with walking  Patient continues to eat, drink, urinate, stool normally  Prior to Admission Medications   Prescriptions Last Dose Informant Patient Reported? Taking? OXYGEN-HELIUM IN 2022 at Unknown time Self Yes Yes   Sig: Inhale 2L at rest- 3L with activity   Ventolin  (90 Base) MCG/ACT inhaler Unknown at Unknown time Self No No   Sig: Inhale 2 puffs every 4 (four) hours as needed for wheezing   ZEMAIRA infusion 2022 at Unknown time Self Yes Yes   Sig: once a week    amLODIPine (NORVASC) 10 mg tablet 2022 at Unknown time Self No Yes   Sig: TAKE ONE TABLET BY MOUTH DAILY    budesonide (PULMICORT) 0 5 mg/2 mL nebulizer solution 2022 at Unknown time  No Yes   Sig: Take 2 mL (0 5 mg total) by nebulization in the morning and 2 mL (0 5 mg total) in the evening  busPIRone (BUSPAR) 10 mg tablet Unknown at Unknown time  No No   Sig: TAKE 1 TABLET BY MOUTH 3 TIMES DAILY   carbidopa-levodopa (SINEMET)  mg per tablet Past Week at Unknown time  No Yes   Sig: take 1 tablet by mouth prior to each meal   cholestyramine (QUESTRAN) 4 g packet Unknown at Unknown time  No No   Sig: Take 1 packet (4 g total) by mouth in the morning     finasteride (PROSCAR) 5 mg tablet 2022 at Unknown time Self No Yes   Sig: TAKE ONE TABLET BY MOUTH IN THE MORNING    fluticasone (FLONASE) 50 mcg/act nasal spray 2022 at Unknown time Self No Yes   Si sprays into each nostril daily   formoterol (PERFOROMIST) 20 MCG/2ML nebulizer solution 7/14/2022 at Unknown time  No Yes   Sig: Take 2 mL (20 mcg total) by nebulization every 12 (twelve) hours   gabapentin (NEURONTIN) 100 mg capsule 7/14/2022 at Unknown time  No Yes   Sig: Take 2 capsules (200 mg total) by mouth 2 (two) times a day   ipratropium-albuterol (DUO-NEB) 0 5-2 5 mg/3 mL nebulizer solution 7/14/2022 at Unknown time Self No Yes   Sig: Take 3 mL by nebulization 4 (four) times a day   metoprolol tartrate (LOPRESSOR) 25 mg tablet 7/14/2022 at Unknown time  No Yes   Sig: Take 1 tablet (25 mg total) by mouth every 12 (twelve) hours   midodrine (PROAMATINE) 10 MG tablet 7/14/2022 at Unknown time  No Yes   Sig: Take 1 tab three times daily AS NEEDED if SBP less than 100  Please hold medication if blood pressure is above 602 systolic    mirtazapine (REMERON) 15 mg tablet Past Week at Unknown time  No Yes   Sig: Take 2 tablets (30 mg total) by mouth daily at bedtime   pantoprazole (PROTONIX) 40 mg tablet 7/14/2022 at Unknown time  No Yes   Sig: TAKE ONE TABLET BY MOUTH TWICE DAILY   predniSONE 10 mg tablet 7/14/2022 at Unknown time  No Yes   Sig: Take 4 tablets (40 mg total) by mouth daily for 7 days, THEN 3 tablets (30 mg total) daily for 7 days, THEN 2 tablets (20 mg total) daily for 7 days, THEN 1 tablet (10 mg total) daily     tamsulosin (FLOMAX) 0 4 mg 7/14/2022 at Unknown time  No Yes   Sig: TAKE ONE CAPSULE BY MOUTH ONE TIME DAILY      Facility-Administered Medications: None       Past Medical History:   Diagnosis Date    Anesthesia complication     Difficult to wake up    Arthritis     BPH (benign prostatic hyperplasia)     urinary frequency    Cancer (HCC)     basal cell neck, face    CKD (chronic kidney disease) stage 2, GFR 60-89 ml/min 07/04/2022    COPD (chronic obstructive pulmonary disease) (HCC)     COPD exacerbation ([facility] [address] ) 12/29/2019    Coronary artery disease     Elevated PSA 10/25/2018    Full dentures     Hiatal hernia     History of methicillin resistant staphylococcus aureus (MRSA)     10/11/2018 MRSA (nares) positive    Hypertension     Irritable bowel syndrome     Kidney stone     at least 7 episodes    Liver disease     Alpha 1- enzyme deficiency - diagnosed 2002  has been on weekly replacement therapy since then    Pulmonary emphysema ([facility] [address] )     1/25/15  FEV1 - 2 45 liters or 59% of predicted    RSV infection 12/2017    Wears glasses     for driving only       Past Surgical History:   Procedure Laterality Date    BACK SURGERY  2008    discectomy    CARDIAC CATHETERIZATION N/A 5/3/2022    Procedure: Cardiac catheterization both left and right heart catheterization with vaso dilatory trial;  Surgeon: Renetta Luis MD;  Location: [place] [address] CATH LAB; Service: Cardiology    CARDIAC CATHETERIZATION Left 5/3/2022    Procedure: Cardiac Left Heart Cath;  Surgeon: Renetta Luis MD;  Location: [place] [address] CATH LAB; Service: Cardiology    CARDIAC CATHETERIZATION Left 5/3/2022    Procedure: Cardiac Left Ventriculogram;  Surgeon: Renetta Luis MD;  Location: [place] [address] CATH LAB; Service: Cardiology    COLONOSCOPY      COLONOSCOPY N/A 3/10/2017    Procedure: Virginia Luis;  Surgeon: Brianne Goldsmith MD;  Location: [facility] GI LAB; Service:    Linda Didier      removal of kidney stones    ESOPHAGOGASTRODUODENOSCOPY N/A 3/10/2017    Procedure: ESOPHAGOGASTRODUODENOSCOPY (EGD); Surgeon: Brianne Goldsmith MD;  Location: [facility] GI LAB; Service:     LITHOTRIPSY      MD ESOPHAGOGASTRODUODENOSCOPY TRANSORAL DIAGNOSTIC N/A 11/20/2018    Procedure: ESOPHAGOGASTRODUODENOSCOPY (EGD); Surgeon: Brianne Goldsmith MD;  Location: [facility] GI LAB;   Service: Gastroenterology    TONSILLECTOMY      VEIN LIGATION AND STRIPPING Bilateral     18's       Family History   Problem Relation Age of Onset    Emphysema Mother         never smoked    Emphysema Father     Cancer Brother         colon    Colon cancer Brother     Ulcerative colitis Family     Liver disease Family      I have reviewed and agree with the history as documented  E-Cigarette/Vaping    E-Cigarette Use Never User      E-Cigarette/Vaping Substances    Nicotine No     THC No     CBD No     Flavoring No     Other No     Unknown No      Social History     Tobacco Use    Smoking status: Former Smoker     Packs/day: 1 00     Years: 60 00     Pack years: 60 00     Quit date: 2017     Years since quittin 8    Smokeless tobacco: Never Used    Tobacco comment: quit in 2017   Vaping Use    Vaping Use: Never used   Substance Use Topics    Alcohol use: Never     Comment: stopped in     Drug use: Not Currently       Review of Systems   Constitutional: Positive for fatigue  Negative for chills and fever  HENT: Negative for congestion, rhinorrhea and sore throat  Eyes: Negative for photophobia and visual disturbance  Respiratory: Positive for cough, shortness of breath and wheezing  Negative for chest tightness  Cardiovascular: Negative for chest pain, palpitations and leg swelling  Gastrointestinal: Negative for abdominal distention, abdominal pain, diarrhea, nausea and vomiting  Endocrine: Negative for polydipsia and polyuria  Genitourinary: Negative for dysuria and hematuria  Musculoskeletal: Negative for arthralgias and myalgias  Skin: Negative for color change, pallor, rash and wound  Neurological: Negative for weakness, numbness and headaches  Psychiatric/Behavioral: Negative for confusion  Physical Exam  Physical Exam  Vitals and nursing note reviewed  Constitutional:       General: He is not in acute distress  Appearance: He is well-developed  He is not diaphoretic  HENT:      Head: Normocephalic and atraumatic  Right Ear: External ear normal       Left Ear: External ear normal       Nose: Nose normal       Mouth/Throat:      Pharynx: No oropharyngeal exudate     Eyes:      Conjunctiva/sclera: Conjunctivae normal       Pupils: Pupils are equal, round, and reactive to light  Cardiovascular:      Rate and Rhythm: Normal rate and regular rhythm  Heart sounds: Normal heart sounds  No murmur heard  No friction rub  No gallop  Pulmonary:      Effort: Pulmonary effort is normal  No respiratory distress  Breath sounds: Normal breath sounds  No wheezing  Comments: Sat'ing in the mid 90s on room air  Conversational without conversational dyspnea  No increased WOB  Diffuse wheezing most pronounced in lower lung fields  Chest:      Chest wall: No tenderness  Abdominal:      General: Bowel sounds are normal  There is no distension  Palpations: Abdomen is soft  There is no mass  Tenderness: There is no abdominal tenderness  There is no guarding or rebound  Musculoskeletal:         General: No deformity  Comments: No lower extremity swelling, erythema, or calf tenderness    Skin:     General: Skin is warm and dry  Capillary Refill: Capillary refill takes less than 2 seconds  Neurological:      Mental Status: He is alert and oriented to person, place, and time     Psychiatric:         Behavior: Behavior normal          Vital Signs  ED Triage Vitals   Temperature Pulse Respirations Blood Pressure SpO2   07/14/22 1137 07/14/22 1137 07/14/22 1137 07/14/22 1137 07/14/22 1137   97 9 °F (36 6 °C) 93 20 169/82 95 %      Temp Source Heart Rate Source Patient Position - Orthostatic VS BP Location FiO2 (%)   07/14/22 1137 07/14/22 1137 07/14/22 1137 07/14/22 1137 --   Tympanic Monitor Sitting Right arm       Pain Score       07/14/22 1646       No Pain           Vitals:    07/14/22 1900 07/14/22 2018 07/15/22 0300 07/15/22 0722   BP: 143/66  138/70    Pulse: 82 79 82 59   Patient Position - Orthostatic VS: Lying  Lying          Visual Acuity  Visual Acuity    Flowsheet Row Most Recent Value   L Pupil Size (mm) 2   R Pupil Size (mm) 2          ED Medications  Medications   budesonide (PULMICORT) inhalation solution 0 5 mg (0 5 mg Nebulization Not Given 7/15/22 0725)   fluticasone (FLONASE) 50 mcg/act nasal spray 2 spray (has no administration in time range)   formoterol (PERFOROMIST) nebulizer solution 20 mcg (20 mcg Nebulization Given 7/15/22 0721)   gabapentin (NEURONTIN) capsule 200 mg (200 mg Oral Given 7/14/22 2016)   ipratropium-albuterol (DUO-NEB) 0 5-2 5 mg/3 mL inhalation solution 3 mL (3 mL Nebulization Given 7/15/22 0720)   metoprolol tartrate (LOPRESSOR) tablet 25 mg (25 mg Oral Given 7/14/22 2016)   mirtazapine (REMERON) tablet 30 mg (30 mg Oral Given 7/14/22 2135)   amLODIPine (NORVASC) tablet 10 mg (has no administration in time range)   busPIRone (BUSPAR) tablet 10 mg (10 mg Oral Given 7/14/22 2015)   finasteride (PROSCAR) tablet 5 mg (has no administration in time range)   carbidopa-levodopa (SINEMET)  mg per tablet 1 tablet (1 tablet Oral Given 7/14/22 2135)   pantoprazole (PROTONIX) EC tablet 40 mg (40 mg Oral Given 7/14/22 2015)   tamsulosin (FLOMAX) capsule 0 4 mg (has no administration in time range)   midodrine (PROAMATINE) tablet 10 mg (has no administration in time range)   enoxaparin (LOVENOX) subcutaneous injection 40 mg (has no administration in time range)   ipratropium-albuterol (DUO-NEB) 0 5-2 5 mg/3 mL inhalation solution 3 mL (has no administration in time range)   methylPREDNISolone sodium succinate (Solu-MEDROL) injection 40 mg (40 mg Intravenous Given 7/15/22 0547)   ipratropium-albuterol (DUO-NEB) 0 5-2 5 mg/3 mL inhalation solution 3 mL (3 mL Nebulization Given 7/14/22 1154)   methylPREDNISolone sodium succinate (Solu-MEDROL) injection 125 mg (125 mg Intravenous Given 7/14/22 1154)   meclizine (ANTIVERT) tablet 25 mg (25 mg Oral Given 7/14/22 1328)   albuterol inhalation solution 10 mg (10 mg Nebulization Given 7/14/22 1450)     And   ipratropium (ATROVENT) 0 02 % inhalation solution 1 mg (1 mg Nebulization Given 7/14/22 1450)     And   sodium chloride 0 9 % inhalation solution 3 mL (3 mL Nebulization Given 7/14/22 1450)       Diagnostic Studies  Results Reviewed     Procedure Component Value Units Date/Time    COVID/FLU/RSV [[id number]]  (Normal) Collected: 07/14/22 1153    Lab Status: Final result Specimen: Nares from Nose Updated: 07/14/22 1255     SARS-CoV-2 Negative     INFLUENZA A PCR Negative     INFLUENZA B PCR Negative     RSV PCR Negative    Narrative:      FOR PEDIATRIC PATIENTS - copy/paste COVID Guidelines URL to browser: [email or web address]  [email or web address]    SARS-CoV-2 assay is a Nucleic Acid Amplification assay intended for the  qualitative detection of nucleic acid from SARS-CoV-2 in nasopharyngeal  swabs  Results are for the presumptive identification of SARS-CoV-2 RNA  Positive results are indicative of infection with SARS-CoV-2, the virus  causing COVID-19, but do not rule out bacterial infection or co-infection  with other viruses  Laboratories within the [place] and its  territories are required to report all positive results to the appropriate  public health authorities  Negative results do not preclude SARS-CoV-2  infection and should not be used as the sole basis for treatment or other  patient management decisions  Negative results must be combined with  clinical observations, patient history, and epidemiological information  This test has not been FDA cleared or approved  This test has been authorized by FDA under an Emergency Use Authorization  (EUA)  This test is only authorized for the duration of time the  declaration that circumstances exist justifying the authorization of the  emergency use of an in vitro diagnostic tests for detection of SARS-CoV-2  virus and/or diagnosis of COVID-19 infection under section 564(b)(1) of  the Act, 21 U  S C  818GET-3(Q)(8), unless the authorization is terminated  or revoked sooner   The test has been validated but independent review by FDA  and CLIA is pending  Test performed using [employer] Gene[employer]pert: This RT-PCR assay targets N2,  a region unique to SARS-CoV-2  A conserved region in the E-gene was chosen  for pan-Sarbecovirus detection which includes SARS-CoV-2  Manual Differential(PHLEBS Do Not Order) [[id number]]  (Abnormal) Collected: 07/14/22 1153    Lab Status: Final result Specimen: Blood from Arm, Right Updated: 07/14/22 1245     Segmented % 75 %      Lymphocytes % 21 %      Monocytes % 3 %      Eosinophils, % 0 %      Basophils % 0 %      Metamyelocytes% 1 %      Absolute Neutrophils 7 57 Thousand/uL      Lymphocytes Absolute 2 12 Thousand/uL      Monocytes Absolute 0 30 Thousand/uL      Eosinophils Absolute 0 00 Thousand/uL      Basophils Absolute 0 00 Thousand/uL      Total Counted --     RBC Morphology Normal     Platelet Estimate Borderline    CBC and differential [[id number]]  (Abnormal) Collected: 07/14/22 1153    Lab Status: Final result Specimen: Blood from Arm, Right Updated: 07/14/22 1245     WBC 10 09 Thousand/uL      RBC 4 08 Million/uL      Hemoglobin 13 3 g/dL      Hematocrit 39 4 %      MCV 97 fL      MCH 32 6 pg      MCHC 33 8 g/dL      RDW 15 8 %      MPV 9 7 fL      Platelets 079 Thousands/uL     Narrative: This is an appended report  These results have been appended to a previously verified report      Comprehensive metabolic panel [[id number]]  (Abnormal) Collected: 07/14/22 1153    Lab Status: Final result Specimen: Blood from Arm, Right Updated: 07/14/22 1219     Sodium 138 mmol/L      Potassium 4 4 mmol/L      Chloride 104 mmol/L      CO2 24 mmol/L      ANION GAP 10 mmol/L      BUN 21 mg/dL      Creatinine 1 04 mg/dL      Glucose 138 mg/dL      Calcium 8 5 mg/dL      AST 38 U/L      ALT 81 U/L      Alkaline Phosphatase 62 U/L      Total Protein 6 3 g/dL      Albumin 3 5 g/dL      Total Bilirubin 1 06 mg/dL      eGFR 68 ml/min/1 73sq m     Narrative:      [place] guidelines for Chronic Kidney Disease (CKD):     Stage 1 with normal or high GFR (GFR > 90 mL/min/1 73 square meters)    Stage 2 Mild CKD (GFR = 60-89 mL/min/1 73 square meters)    Stage 3A Moderate CKD (GFR = 45-59 mL/min/1 73 square meters)    Stage 3B Moderate CKD (GFR = 30-44 mL/min/1 73 square meters)    Stage 4 Severe CKD (GFR = 15-29 mL/min/1 73 square meters)    Stage 5 End Stage CKD (GFR <15 mL/min/1 73 square meters)  Note: GFR calculation is accurate only with a steady state creatinine                 XR chest 1 view portable   Final Result by Manasa Ignacio MD (07/14 1541)      No acute cardiopulmonary disease  Workstation performed: [id number]                    Procedures  Procedures         ED Course                                             MDM  Number of Diagnoses or Management Options  COPD (chronic obstructive pulmonary disease) (HCC)  Lightheadedness  Shortness of breath  Diagnosis management comments: Diffuse wheezing consistent with patient's history of COPD however patient in no respiratory distress  Symptomatic improvement following duoneb and steroids  Lab evaluation including CBC, CMP, COVID testing all unremarkable  Attempted to ambulate patient however became extremely dyspneic  Treated with HEART neb, admitted to medicine service for further management         Disposition  Final diagnoses:   Shortness of breath   Lightheadedness   COPD (chronic obstructive pulmonary disease) ([facility] )     Time reflects when diagnosis was documented in both MDM as applicable and the Disposition within this note     Time User Action Codes Description Comment    7/14/2022  2:50 PM Berton Kraft Add [R06 02] Shortness of breath     7/14/2022  2:50 PM Berton Kraft Add [R42] Lightheadedness     7/14/2022  2:51 PM Berton Kraft Add [J44 9] COPD (chronic obstructive pulmonary disease) ([facility] )     7/14/2022  7:32 PM Carmenza Spine Add [R06 00] Exertional dyspnea     7/14/2022  7:32 PM Learta Burn [E88 01] Alpha-1-antitrypsin deficiency [facility])       ED Disposition     ED Disposition   Admit    Condition   Stable    Date/Time   Thu Jul 14, 2022  2:50 PM    Comment   Case was discussed with LOR and the patient's admission status was agreed to be Admission Status: observation status to the service of Dr Donavon Quinteros   Follow-up Information    None         Current Discharge Medication List      CONTINUE these medications which have NOT CHANGED    Details   amLODIPine (NORVASC) 10 mg tablet TAKE ONE TABLET BY MOUTH DAILY   Qty: 30 tablet, Refills: 6    Associated Diagnoses: Essential hypertension      budesonide (PULMICORT) 0 5 mg/2 mL nebulizer solution Take 2 mL (0 5 mg total) by nebulization in the morning and 2 mL (0 5 mg total) in the evening    Qty: 360 mL, Refills: 11    Associated Diagnoses: Centrilobular emphysema (HCC)      carbidopa-levodopa (SINEMET)  mg per tablet take 1 tablet by mouth prior to each meal  Qty: 90 tablet, Refills: 0    Associated Diagnoses: Parkinson's disease (HCC)      finasteride (PROSCAR) 5 mg tablet TAKE ONE TABLET BY MOUTH IN THE MORNING   Qty: 90 tablet, Refills: 3    Associated Diagnoses: Benign prostatic hyperplasia with urinary obstruction      fluticasone (FLONASE) 50 mcg/act nasal spray 2 sprays into each nostril daily  Qty: 16 g, Refills: 5    Associated Diagnoses: Rhinitis, unspecified type      formoterol (PERFOROMIST) 20 MCG/2ML nebulizer solution Take 2 mL (20 mcg total) by nebulization every 12 (twelve) hours  Qty: 120 mL, Refills: 0    Associated Diagnoses: COPD with acute exacerbation (HCC)      gabapentin (NEURONTIN) 100 mg capsule Take 2 capsules (200 mg total) by mouth 2 (two) times a day  Qty: 120 capsule, Refills: 0    Associated Diagnoses: Parkinson's disease (HCC)      ipratropium-albuterol (DUO-NEB) 0 5-2 5 mg/3 mL nebulizer solution Take 3 mL by nebulization 4 (four) times a day  Qty: 360 mL, Refills: 5    Associated Diagnoses: COPD exacerbation ([facility] )      metoprolol tartrate (LOPRESSOR) 25 mg tablet Take 1 tablet (25 mg total) by mouth every 12 (twelve) hours  Qty: 60 tablet, Refills: 0    Associated Diagnoses: Essential hypertension      midodrine (PROAMATINE) 10 MG tablet Take 1 tab three times daily AS NEEDED if SBP less than 100  Please hold medication if blood pressure is above 526 systolic  Qty: 90 tablet, Refills: 4    Associated Diagnoses: Orthostatic hypertension      mirtazapine (REMERON) 15 mg tablet Take 2 tablets (30 mg total) by mouth daily at bedtime  Qty: 30 tablet, Refills: 5    Associated Diagnoses: Primary insomnia      OXYGEN-HELIUM IN Inhale 2L at rest- 3L with activity      pantoprazole (PROTONIX) 40 mg tablet TAKE ONE TABLET BY MOUTH TWICE DAILY  Qty: 180 tablet, Refills: 0    Associated Diagnoses: Gastroesophageal reflux disease without esophagitis      predniSONE 10 mg tablet Take 4 tablets (40 mg total) by mouth daily for 7 days, THEN 3 tablets (30 mg total) daily for 7 days, THEN 2 tablets (20 mg total) daily for 7 days, THEN 1 tablet (10 mg total) daily  Qty: 93 tablet, Refills: 0    Associated Diagnoses: COPD with acute exacerbation (HCC)      tamsulosin (FLOMAX) 0 4 mg TAKE ONE CAPSULE BY MOUTH ONE TIME DAILY  Qty: 90 capsule, Refills: 1    Associated Diagnoses: Benign prostatic hyperplasia with urinary obstruction      ZEMAIRA infusion once a week       busPIRone (BUSPAR) 10 mg tablet TAKE 1 TABLET BY MOUTH 3 TIMES DAILY  Qty: 90 tablet, Refills: 0    Associated Diagnoses: Chronic respiratory failure with hypoxia (HCC)      cholestyramine (QUESTRAN) 4 g packet Take 1 packet (4 g total) by mouth in the morning  Qty: 60 each, Refills: 0    Associated Diagnoses: Centrilobular emphysema (HCC)      Ventolin  (90 Base) MCG/ACT inhaler Inhale 2 puffs every 4 (four) hours as needed for wheezing  Qty: 18 g, Refills: 5    Associated Diagnoses: Centrilobular emphysema (HCC)             No discharge procedures on file      PDMP Review       Value Time User    PDMP Reviewed  Yes 4/5/2022  9:56 AM Tramaine Hunter MD          ED Provider  Electronically Signed by           Júnior Castro MD  07/15/22 9077

## 2022-07-15 NOTE — DISCHARGE SUMMARY
[facility] Discharge Summary - Medical Homero Alpha 65 y o  male MRN: [id number]    [address] [facility] [address] / Bed: 2 [facility] 328/3 [place] Encounter: [id number]    BRIEF OVERVIEW  Admitting Provider: Radha Guerra MD  Discharge Provider: Radha Guerra MD    Discharge To: Short Term Rehab  Facility: [facility]    Outpatient Follow-Up: [facility], pulmonology, palliative Care    Things to address at first follow up visit:   · Recent readmission, is the patient still having dyspnea with minimal exertion? · How is he doing on home O2 3 L?  · How is the patient doing with his prednisone taper and duonebs? · Steroid taper started on 22 (40 mg x 7 days, 30 mg x 7 days, 20 mg x 7 days, 10 mg x 7 days)  · CBC in 1 week due to leukocytosis upon discharge    Med changes:  Stop metoprolol for possible side effects contributing to dyspnea    Start on chlorthalidone 25 mg daily    Labs and results pending at discharge: Alpha-1-antitrypsin level    Admission Date: 2022     Discharge Date: 07/15/2022    Primary Discharge Diagnosis  Principal Problem (Resolved):    Sepsis without acute organ dysfunction ([address] )  Active Problems:    Alpha-1-antitrypsin deficiency ([address] )    Essential hypertension    Centrilobular emphysema (HCC)    Gastroesophageal reflux disease    BPH (benign prostatic hyperplasia)    Liver disease    Chronic pulmonary embolism (HCC)    Insomnia    Depression, recurrent (HCC)    Anxiety    Parkinson's disease ([facility] [address] )    Panlobular emphysema ([facility] [address] )  Resolved Problems:    Exertional dyspnea    Lightheadedness    Pneumonia due to infectious organism    Consulting Providers: Pulmonology       [address] STAY    Procedures Performed/Pertinent Test results     7/15: WBC 12 88,   :  WBC 10 09, , AB  1/105  2    - CXR emphysema, NAD    HPI  65year old male with alpha-1-antitrypsin deficiency, COPD on 3L O2 at home, CAD, hypertension, malignancy, and multiple comorbidities presents with worsening dyspnea on exertion, lightheadness, and generalized weakness since being discharged for similar presentation 5 days ago  He reports he saw his pulmonologist yesterday and received his Zemaira infusion for the alpha-1-antitrypsin deficiency  He denies having shortness of breath after receiving these infusions in the past  He states he is able to drive his car at baseline but states he could not wait until his next doctor's appointment due to worsening dyspnea  He reports associated dry cough  Denies chest pain, fever, or lower extremity swelling  Patient also reports lightheadedness described as the sensation of passing out, denies room spinning    Denies vision changes, headaches, nausea, vomiting, or urinary changes  ED: Albuterol 10 mg x 1, Duoneb x 1, Solumedrol 125 mg x 1    Hospital Course    COPD with acute exacerbation  - Pt with COPD, on 3L home O2, presented with dry cough and increased work of breathing with minimal exertion  - Inpatient: Duoneb standing + PRN, steroid tapered as tolerated  - No indication for antibiotics  Chest x-ray showed no cardiopulmonary disease, no consolidation, pt was afebrile  - Pulmonology consulted  - Discharge to short-term rehab on prednisone taper    Alpha-1-antitrypsin deficiency   - Pt received Zemaira infusion at pulmonologists' office day prior to admission  - Alpha-1 antitrypsin level ordered    Essential HTN  - Patient's home med changed from metoprolol to chlorthalidone 25 mg due to worsening dyspnea likely due to progression of alpha-1-antitrypsin    Parkinson's disease  Possibly contributing to the respiratory distress and worsening COPD  Continued on carbidopa levodopa  Anxiety and depression  Stable  Continued on Mirtazapine and buspar      Physical Exam at Discharge  Physical Exam  HENT:      Head: Normocephalic     Eyes:      Extraocular Movements: Extraocular movements intact  Conjunctiva/sclera: Conjunctivae normal    Cardiovascular:      Rate and Rhythm: Normal rate  Heart sounds: Normal heart sounds  No murmur heard  Pulmonary:      Effort: No respiratory distress  Comments: Mild wheezes on lower lung fields  Abdominal:      Palpations: Abdomen is soft  Tenderness: There is no abdominal tenderness  There is no guarding  Musculoskeletal:         General: No swelling  Cervical back: Normal range of motion  Skin:     General: Skin is warm and dry  Neurological:      Mental Status: He is alert  Medications   Current Discharge Medication List          Current Discharge Medication List      CONTINUE these medications which have NOT CHANGED    Details   amLODIPine (NORVASC) 10 mg tablet TAKE ONE TABLET BY MOUTH DAILY   Qty: 30 tablet, Refills: 6    Associated Diagnoses: Essential hypertension      budesonide (PULMICORT) 0 5 mg/2 mL nebulizer solution Take 2 mL (0 5 mg total) by nebulization in the morning and 2 mL (0 5 mg total) in the evening    Qty: 360 mL, Refills: 11    Associated Diagnoses: Centrilobular emphysema (HCC)      carbidopa-levodopa (SINEMET)  mg per tablet take 1 tablet by mouth prior to each meal  Qty: 90 tablet, Refills: 0    Associated Diagnoses: Parkinson's disease (HCC)      finasteride (PROSCAR) 5 mg tablet TAKE ONE TABLET BY MOUTH IN THE MORNING   Qty: 90 tablet, Refills: 3    Associated Diagnoses: Benign prostatic hyperplasia with urinary obstruction      fluticasone (FLONASE) 50 mcg/act nasal spray 2 sprays into each nostril daily  Qty: 16 g, Refills: 5    Associated Diagnoses: Rhinitis, unspecified type      formoterol (PERFOROMIST) 20 MCG/2ML nebulizer solution Take 2 mL (20 mcg total) by nebulization every 12 (twelve) hours  Qty: 120 mL, Refills: 0    Associated Diagnoses: COPD with acute exacerbation (HCC)      gabapentin (NEURONTIN) 100 mg capsule Take 2 capsules (200 mg total) by mouth 2 (two) times a day  Qty: 120 capsule, Refills: 0    Associated Diagnoses: Parkinson's disease ([facility] [address] )      ipratropium-albuterol (DUO-NEB) 0 5-2 5 mg/3 mL nebulizer solution Take 3 mL by nebulization 4 (four) times a day  Qty: 360 mL, Refills: 5    Associated Diagnoses: COPD exacerbation (HCC)      midodrine (PROAMATINE) 10 MG tablet Take 1 tab three times daily AS NEEDED if SBP less than 100  Please hold medication if blood pressure is above 105 systolic  Qty: 90 tablet, Refills: 4    Associated Diagnoses: Orthostatic hypertension      mirtazapine (REMERON) 15 mg tablet Take 2 tablets (30 mg total) by mouth daily at bedtime  Qty: 30 tablet, Refills: 5    Associated Diagnoses: Primary insomnia      OXYGEN-HELIUM IN Inhale 2L at rest- 3L with activity      pantoprazole (PROTONIX) 40 mg tablet TAKE ONE TABLET BY MOUTH TWICE DAILY  Qty: 180 tablet, Refills: 0    Associated Diagnoses: Gastroesophageal reflux disease without esophagitis      predniSONE 10 mg tablet Take 4 tablets (40 mg total) by mouth daily for 7 days, THEN 3 tablets (30 mg total) daily for 7 days, THEN 2 tablets (20 mg total) daily for 7 days, THEN 1 tablet (10 mg total) daily  Qty: 93 tablet, Refills: 0    Associated Diagnoses: COPD with acute exacerbation (HCC)      tamsulosin (FLOMAX) 0 4 mg TAKE ONE CAPSULE BY MOUTH ONE TIME DAILY  Qty: 90 capsule, Refills: 1    Associated Diagnoses: Benign prostatic hyperplasia with urinary obstruction      busPIRone (BUSPAR) 10 mg tablet TAKE 1 TABLET BY MOUTH 3 TIMES DAILY  Qty: 90 tablet, Refills: 0    Associated Diagnoses: Chronic respiratory failure with hypoxia (HCC)      cholestyramine (QUESTRAN) 4 g packet Take 1 packet (4 g total) by mouth in the morning    Qty: 60 each, Refills: 0    Associated Diagnoses: Centrilobular emphysema (HCC)      Ventolin  (90 Base) MCG/ACT inhaler Inhale 2 puffs every 4 (four) hours as needed for wheezing  Qty: 18 g, Refills: 5    Associated Diagnoses: Centrilobular emphysema ([address] ) Current Discharge Medication List      START taking these medications    Details   chlorthalidone 25 mg tablet Take 1 tablet (25 mg total) by mouth daily  Qty: 90 tablet, Refills: 0    Associated Diagnoses: Essential hypertension            Current Discharge Medication List      STOP taking these medications       metoprolol tartrate (LOPRESSOR) 25 mg tablet Comments:   Reason for Stopping:         [facility] [place] infusion Comments:   Reason for Stopping:                Allergies  Allergies   Allergen Reactions    Chantix [Varenicline]      Caused prostate infection       Diet restrictions: none  Activity restrictions: none  Code Status: Level 1 - Full Code  Advance Directive and Living Will: <no information>  Power of :    POLST:      Discharge Condition: fair      Discharge  Statement   I spent 30 minutes discharging the patient  This time was spent on the day of discharge  I had direct contact with the patient on the day of discharge  Additional documentation is required if more than 30 minutes were spent on discharge

## 2022-07-15 NOTE — ASSESSMENT & PLAN NOTE
Mild tachycardia and borderline tachypnea likely secondary to increased work of breathing rather than pulmonary embolism  Denies chest pain

## 2022-07-15 NOTE — PLAN OF CARE
Problem: Potential for Falls  Goal: Patient will remain free of falls  Description: INTERVENTIONS:  - Educate patient/family on patient safety including physical limitations  - Instruct patient to call for assistance with activity   - Consult OT/PT to assist with strengthening/mobility   - Keep Call bell within reach  - Keep bed low and locked with side rails adjusted as appropriate  - Keep care items and personal belongings within reach  - Initiate and maintain comfort rounds  - Make Fall Risk Sign visible to staff  - Offer Toileting every 2 Hours, in advance of need  - Initiate/Maintain bed alarm  - Obtain necessary fall risk management equipment: bed alarm, yellow socks   - Apply yellow socks and bracelet for high fall risk patients  - Consider moving patient to room near nurses station  Outcome: Progressing     Problem: MOBILITY - ADULT  Goal: Maintain or return to baseline ADL function  Description: INTERVENTIONS:  -  Assess patient's ability to carry out ADLs; assess patient's baseline for ADL function and identify physical deficits which impact ability to perform ADLs (bathing, care of mouth/teeth, toileting, grooming, dressing, etc )  - Assess/evaluate cause of self-care deficits   - Assess range of motion  - Assess patient's mobility; develop plan if impaired  - Assess patient's need for assistive devices and provide as appropriate  - Encourage maximum independence but intervene and supervise when necessary  - Involve family in performance of ADLs  - Assess for home care needs following discharge   - Consider OT consult to assist with ADL evaluation and planning for discharge  - Provide patient education as appropriate  Outcome: Progressing  Goal: Maintains/Returns to pre admission functional level  Description: INTERVENTIONS:  - Perform BMAT or MOVE assessment daily    - Set and communicate daily mobility goal to care team and patient/family/caregiver     - Collaborate with rehabilitation services on mobility goals if consulted  - Perform Range of Motion 3 times a day  - Reposition patient every 2 hours    - Dangle patient 3 times a day  - Stand patient 3 times a day  - Ambulate patient 3 times a day  - Out of bed to chair 3 times a day   - Out of bed for meals 3 times a day  - Out of bed for toileting  - Record patient progress and toleration of activity level   Outcome: Progressing     Problem: RESPIRATORY - ADULT  Goal: Achieves optimal ventilation and oxygenation  Description: INTERVENTIONS:  - Assess for changes in respiratory status  - Assess for changes in mentation and behavior  - Position to facilitate oxygenation and minimize respiratory effort  - Oxygen administered by appropriate delivery if ordered  - Initiate smoking cessation education as indicated  - Encourage broncho-pulmonary hygiene including cough, deep breathe, Incentive Spirometry  - Assess the need for suctioning and aspirate as needed  - Assess and instruct to report SOB or any respiratory difficulty  - Respiratory Therapy support as indicated  Outcome: Progressing

## 2022-07-15 NOTE — RESPIRATORY THERAPY NOTE
RT Protocol Note  Manny Jeronimo 65 y o  male MRN: [id number]  Unit/Bed#: [address] [place] [address] Encounter: [id number]    Assessment    Principal Problem:    Exertional dyspnea  Active Problems:    Alpha-1-antitrypsin deficiency (HCC)    Gastroesophageal reflux disease    Essential hypertension    BPH (benign prostatic hyperplasia)    Liver disease    Chronic pulmonary embolism (HCC)    Centrilobular emphysema (HCC)    Lightheadedness    Insomnia    Depression, recurrent (HCC)    Anxiety    Parkinson's disease ([address] )      Home Pulmonary Medications:  Duoneb, pulmicort, performist  Home Devices/Therapy: Home O2    Past Medical History:   Diagnosis Date    Anesthesia complication     Difficult to wake up    Arthritis     BPH (benign prostatic hyperplasia)     urinary frequency    Cancer (HCC)     basal cell neck, face    CKD (chronic kidney disease) stage 2, GFR 60-89 ml/min 07/04/2022    COPD (chronic obstructive pulmonary disease) (HCC)     COPD exacerbation (HCC) 12/29/2019    Coronary artery disease     Elevated PSA 10/25/2018    Full dentures     Hiatal hernia     History of methicillin resistant staphylococcus aureus (MRSA)     10/11/2018 MRSA (nares) positive    Hypertension     Irritable bowel syndrome     Kidney stone     at least 7 episodes    Liver disease     Alpha 1- enzyme deficiency - diagnosed 2002  has been on weekly replacement therapy since then    Pul[facility] emph[facility]sema ([facility] )     1/25/15  FEV1 - 2 45 liters or 59% of predicted    RSV infection 12/2017    Wears glasses     for driving only     Social History     Socioeconomic History    Marital status:       Spouse name: Not on file    Number of children: Not on file    Years of education: Not on file    Highest education level: Not on file   Occupational History    Not on file   Tobacco Use    Smoking status: Former Smoker     Packs/day: 1 00     Years: 60 00     Pack years: 60 00     Quit date: 8/31/2017     Years since quitting: 4  8    Smokeless tobacco: Never Used    Tobacco comment: quit in august 2017   Vaping Use    Vaping Use: Never used   Substance and Sexual Activity    Alcohol use: Never     Comment: stopped in 2009    Drug use: Not Currently    Sexual activity: Not on file   Other Topics Concern    Not on file   Social History Narrative    Patient is   He has three adult children; 1 daughter and 2 sons  His daughter Isai Ochoa lives closest [place] and is very involved w/ his care  Patient is not Caodaism  He lives alone  Social Determinants of Health     Financial Resource Strain: Not on file   Food Insecurity: No Food Insecurity    Worried About Running Out of Food in the Last Year: Never true    Vi of Food in the Last Year: Never true   Transportation Needs: No Transportation Needs    Lack of Transportation (Medical): No    Lack of Transportation (Non-Medical): No   Physical Activity: Not on file   Stress: Not on file   Social Connections: Not on file   Intimate Partner Violence: Not on file   Housing Stability: Low Risk     Unable to Pay for Housing in the Last Year: No    Number of Places Lived in the Last Year: 1    Unstable Housing in the Last Year: No       Subjective         Objective    Physical Exam:   Assessment Type: Pre-treatment  General Appearance: Alert, Awake  Respiratory Pattern: Dyspnea with exertion, Dyspnea at rest  Chest Assessment: Chest expansion symmetrical  Bilateral Breath Sounds: Expiratory wheezes, Diminished  Cough: None  O2 Device: NC    Vitals:  Blood pressure 143/66, pulse 79, temperature 98 4 °F (36 9 °C), temperature source Oral, resp  rate 21, height 6' 5" (1 956 m), weight 83 kg (183 lb), SpO2 96 %      Results from last 7 days   Lab Units 07/14/22 2003   [address] 30 ART  7 415   PCO2 ART mm Hg 32 2*   PO2 ART mm Hg 106 2   HCO3 ART mmol/L 20 2*   BASE EXC ART mmol/L -3 4   O2 CONTENT ART mL/dL 19 1   O2 HGB, ARTERIAL % 97 3*   ABG SOURCE  Radial, Left   FLORENCE TEST Yes         O2 Device: NC     Plan    Respiratory Plan: Home Bronchodilator Patient pathway

## 2022-07-15 NOTE — PROGRESS NOTES
[address] [facility] [address] 81 [facility] Practice Progress Note - [facility] 65 [facility]  [facility] MRN: [id number]    Unit/Bed#: [address] [place] [address] Encounter: [id number]      Assessment/Plan:  * Exertional dyspnea  Assessment & Plan  Worsening  Increased work of breathing compared to baseline  Reports dry cough, atypical for COPD presentation  Patient with history of COPD on 3L home O2, recently admitted for similar symptoms, presented with dyspnea on exertion, lightheadedness, and generalized weakness  Patient received respiratory treatments in the ED but prior to discharge planning, patient had increased work of breathing after ambulating a few steps  On admission interview, patient also had increased work of breathing after standing up to urinate into a urinal at bedside  ED: Albuterol 10 mg x 1, Ipratropium-Albuterol (Duoneb) 0 5-2 5mg/3mL x 1, Solumedrol 125 mg x 1  Plan:  - Duoneb - 0 5-2 5mg/3mL standing + PRN, Solumedrol - 40 mg q8h  - No indication for antibiotics: Chest x-ray shows no acute cardiopulmonary disease, no consolidation, afebrile  - ABG 7 4/32 1/06 2/20 2   - Patient reports improvement overnight, did not need PRN Duonebs  - Pulmonlogy consulted awaitng recs this AM      Alpha-1-antitrypsin deficiency [facility])  Assessment & Plan  Worsening    Patient states he received Zemaira infusion at pulmonologist's office yesterday, denies dyspnea after previous infusions  He states he is able to drive his car at baseline but was unable to get to his next appointment due to worsening dyspnea  - Will check alpha-1-anitrypsin      Lightheadedness  Assessment & Plan  Patient reports lightheadedness and generalized weakness  - Orthostatic vitals, EKG, monitor clinically, monitor fluid status    Essential hypertension  Assessment & Plan  Continue home meds Amlodipine 10 mg, Metoprolol tartae 25 mg BID  Midodrine 10 mg TID per previous parameters    - Transient episode in ED of 219/104 on ambulation, diastolic improved to high 80's after rest  Monitor for hypertension urgency, vision changes, headaches  Centrilobular emphysema ([address] )  Assessment & Plan  Continued home inhalers Albuterol and nebulizers Budesonide, Formoterol, and Duonebs    Parkinson's disease ([facility] )  Assessment & Plan  Continue home Gabapentin 100 mg BID and Carvidopa-Levodopa  mg with meals    Anxiety  Assessment & Plan  Continue home med Buspar 10 mg    Depression, recurrent ([facility] )  Assessment & Plan  Continue home med: Mirtazapine 15 mg    Insomnia  Assessment & Plan  Continue home med: Mirtazapine    Chronic pulmonary embolism (HCC)  Assessment & Plan  Mild tachycardia and borderline tachypnea likely secondary to increased work of breathing rather than pulmonary embolism  Denies chest pain  BPH (benign prostatic hyperplasia)  Assessment & Plan  Continue home meds Finasteride 5 mg and Tamsulosin 0 4 mg     Gastroesophageal reflux disease  Assessment & Plan  Continue home med Protonix 40 mg        Subjective:   Patient seen and examined at bedside, resting comfortably on nasal canula  He states his dyspnea improved overnight and he is not as short of breath when standing up to use the urinal  He demonstrates good appetite and says he is upset they took pritchett out of his diet  On further clarification he states the lightheadedness he initially reported is worse with movement  Denies chest pain, nausea, headache, vision changes, lower extremity swelling  Objective:     Vitals: Blood pressure 138/70, pulse 59, temperature 98 1 °F (36 7 °C), temperature source Oral, resp  rate 18, height 6' 5" (1 956 m), weight 83 kg (183 lb), SpO2 96 %  ,Body mass index is 21 7 kg/m²  Wt Readings from Last 3 Encounters:   07/14/22 83 kg (183 lb)   07/09/22 84 2 kg (185 lb 10 oz)   06/22/22 83 2 kg (183 lb 8 oz)     No intake or output data in the 24 hours ending 07/15/22 0905    Physical Exam:   Physical Exam  HENT:      Head: Normocephalic     Eyes:      Extraocular Movements: Extraocular movements intact  Cardiovascular:      Rate and Rhythm: Normal rate  Heart sounds: Normal heart sounds  No murmur heard  No gallop  Pulmonary:      Comments: Mild wheezes on lower lung fields  Abdominal:      Palpations: Abdomen is soft  Tenderness: There is no abdominal tenderness  There is no guarding  Musculoskeletal:         General: No swelling  Cervical back: Normal range of motion  Skin:     General: Skin is warm and dry  Neurological:      Mental Status: He is alert             Recent Results (from the past 24 hour(s))   CBC and differential    Collection Time: 07/14/22 11:53 AM   Result Value Ref Range    WBC 10 09 4 31 - 10 16 Thousand/uL    RBC 4 08 3 88 - 5 62 Million/uL    Hemoglobin 13 3 12 0 - 17 0 g/dL    Hematocrit 39 4 36 5 - 49 3 %    MCV 97 82 - 98 fL    MCH 32 6 26 8 - 34 3 pg    MCHC 33 8 31 4 - 37 4 g/dL    RDW 15 8 (H) 11 6 - 15 1 %    MPV 9 7 8 9 - 12 7 fL    Platelets 055 (L) 928 - 390 Thousands/uL   Comprehensive metabolic panel    Collection Time: 07/14/22 11:53 AM   Result Value Ref Range    Sodium 138 136 - 145 mmol/L    Potassium 4 4 3 5 - 5 3 mmol/L    Chloride 104 100 - 108 mmol/L    CO2 24 21 - 32 mmol/L    ANION GAP 10 4 - 13 mmol/L    BUN 21 5 - 25 mg/dL    Creatinine 1 04 0 60 - 1 30 mg/dL    Glucose 138 65 - 140 mg/dL    Calcium 8 5 8 3 - 10 1 mg/dL    AST 38 5 - 45 U/L    ALT 81 (H) 12 - 78 U/L    Alkaline Phosphatase 62 46 - 116 U/L    Total Protein 6 3 (L) 6 4 - 8 2 g/dL    Albumin 3 5 3 5 - 5 0 g/dL    Total Bilirubin 1 06 (H) 0 20 - 1 00 mg/dL    eGFR 68 ml/min/1 73sq m   COVID/FLU/RSV    Collection Time: 07/14/22 11:53 AM    Specimen: Nose; Nares   Result Value Ref Range    SARS-CoV-2 Negative Negative    INFLUENZA A PCR Negative Negative    INFLUENZA B PCR Negative Negative    RSV PCR Negative Negative   Manual Differential(PHLEBS Do Not Order)    Collection Time: 07/14/22 11:53 AM   Result Value Ref Range    Segmented % 75 43 - 75 % Lymphocytes % 21 14 - 44 %    Monocytes % 3 (L) 4 - 12 %    Eosinophils, % 0 0 - 6 %    Basophils % 0 0 - 1 %    Metamyelocytes% 1 0 - 1 %    Absolute Neutrophils 7 57 1 85 - 7 62 Thousand/uL    Lymphocytes Absolute 2 12 0 60 - 4 47 Thousand/uL    Monocytes Absolute 0 30 0 00 - 1 22 Thousand/uL    Eosinophils Absolute 0 00 0 00 - 0 40 Thousand/uL    Basophils Absolute 0 00 0 00 - 0 10 Thousand/uL    Total Counted      RBC Morphology Normal     Platelet Estimate Borderline (A) Adequate   Blood gas, arterial    Collection Time: 07/14/22  8:03 PM   Result Value Ref Range    pH, Arterial 7 415 7 350 - 7 450    PH ART TC 7 416 7 350 - 7 450    pCO2, Arterial 32 2 (L) 36 0 - 44 0 mm Hg    PCO2 (TC) Arterial 32 1 (L) 36 0 - 44 0 mm Hg    pO2, Arterial 106 2 75 0 - 129 0 mm Hg    PO2 (TC) Arterial 105 6 75 0 - 129 0 mm Hg    HCO3, Arterial 20 2 (L) 22 0 - 28 0 mmol/L    Base Excess, Arterial -3 4 mmol/L    O2 Content, Arterial 19 1 16 0 - 23 0 mL/dL    O2 HGB,Arterial  97 3 (H) 94 0 - 97 0 %    SOURCE Radial, Left     FLORENCE TEST Yes     Temperature 98 4 Degrees Fehrenheit    Nasal Cannula 3    Basic metabolic panel    Collection Time: 07/15/22  5:54 AM   Result Value Ref Range    Sodium 140 136 - 145 mmol/L    Potassium 4 1 3 5 - 5 3 mmol/L    Chloride 103 100 - 108 mmol/L    CO2 25 21 - 32 mmol/L    ANION GAP 12 4 - 13 mmol/L    BUN 23 5 - 25 mg/dL    Creatinine 0 93 0 60 - 1 30 mg/dL    Glucose 140 65 - 140 mg/dL    Glucose, Fasting 140 (H) 65 - 99 mg/dL    Calcium 8 1 (L) 8 3 - 10 1 mg/dL    eGFR 78 ml/min/1 73sq m   CBC and differential    Collection Time: 07/15/22  5:54 AM   Result Value Ref Range    WBC 12 88 (H) 4 31 - 10 16 Thousand/uL    RBC 3 78 (L) 3 88 - 5 62 Million/uL    Hemoglobin 12 3 12 0 - 17 0 g/dL    Hematocrit 36 4 (L) 36 5 - 49 3 %    MCV 96 82 - 98 fL    MCH 32 5 26 8 - 34 3 pg    MCHC 33 8 31 4 - 37 4 g/dL    RDW 15 5 (H) 11 6 - 15 1 %    MPV 9 8 8 9 - 12 7 fL    Platelets 686 (L) 713 - 390 Thousands/uL   Manual Differential(PHLEBS Do Not Order)    Collection Time: 07/15/22  5:54 AM   Result Value Ref Range    Segmented % 79 (H) 43 - 75 %    Lymphocytes % 17 14 - 44 %    Monocytes % 3 (L) 4 - 12 %    Eosinophils, % 0 0 - 6 %    Basophils % 0 0 - 1 %    Myelocytes % 1 0 - 1 %    Absolute Neutrophils 10 18 (H) 1 85 - 7 62 Thousand/uL    Lymphocytes Absolute 2 19 0 60 - 4 47 Thousand/uL    Monocytes Absolute 0 39 0 00 - 1 22 Thousand/uL    Eosinophils Absolute 0 00 0 00 - 0 40 Thousand/uL    Basophils Absolute 0 00 0 00 - 0 10 Thousand/uL    Total Counted      RBC Morphology Normal     Platelet Estimate Borderline (A) Adequate       Current Facility-Administered Medications   Medication Dose Route Frequency Provider Last Rate Last Admin    amLODIPine (NORVASC) tablet 10 mg  10 mg Oral Daily Zoë Reeder MD        budesonide (PULMICORT) inhalation solution 0 5 mg  0 5 mg Nebulization BID Zoë Reeder MD   0 5 mg at 07/15/22 0720    busPIRone (BUSPAR) tablet 10 mg  10 mg Oral TID Zoë Reeder MD   10 mg at 07/14/22 2015    carbidopa-levodopa (SINEMET)  mg per tablet 1 tablet  1 tablet Oral 4x Daily Zoë Reeder MD   1 tablet at 07/14/22 2135    enoxaparin (LOVENOX) subcutaneous injection 40 mg  40 mg Subcutaneous Daily Zoë Reeder MD        finasteride (PROSCAR) tablet 5 mg  5 mg Oral QAM Zoë Reeder MD        fluticasone Ahmed Nacho) 50 mcg/act nasal spray 2 spray  2 spray Nasal Daily Zoë Reeder MD        formoterol (PERFOROMIST) nebulizer solution 20 mcg  20 mcg Nebulization Q12H Zoë Reeder MD   20 mcg at 07/15/22 0721    gabapentin (NEURONTIN) capsule 200 mg  200 mg Oral BID Zoë Reeder MD   200 mg at 07/14/22 2016    ipratropium-albuterol (DUO-NEB) 0 5-2 5 mg/3 mL inhalation solution 3 mL  3 mL Nebulization 4x Daily Zoë Reeder MD   3 mL at 07/15/22 0720    ipratropium-albuterol (DUO-NEB) 0 5-2 5 mg/3 mL inhalation solution 3 mL  3 mL Nebulization Q6H PRN MD Alfa Cartwright methylPREDNISolone sodium succinate (Solu-MEDROL) injection 40 mg  40 mg Intravenous [facility] Rylan Raines MD   40 mg at 07/15/22 0547    metoprolol tartrate (LOPRESSOR) tablet 25 mg  25 mg Oral Q12H [address] Rylan Raines MD   25 mg at 07/14/22 2016    midodrine (PROAMATINE) tablet 10 mg  10 mg Oral TID AC Rylan Raines MD        mirtazapine (REMERON) tablet 30 mg  30 mg Oral HS Rylan Raines MD   30 mg at 07/14/22 2135    pantoprazole (PROTONIX) EC tablet 40 mg  40 mg Oral Daily Rylan Raines MD   40 mg at 07/14/22 2015    tamsulosin (FLOMAX) capsule 0 4 mg  0 4 mg Oral Daily Rylan Raines MD           Invasive Devices  Report    Peripheral Intravenous Line  Duration           Peripheral IV 07/14/22 Right Forearm <1 day                Lab, Imaging and other studies: I have personally reviewed pertinent reports      VTE Pharmacologic Prophylaxis: Enoxaparin (Lovenox)  VTE Mechanical Prophylaxis: sequential compression device    Rylan Raines MD

## 2022-07-15 NOTE — DISCHARGE INSTR - AVS FIRST PAGE
Please continue inhalers, prednisone tablets, and oxygen as instructed  Please follow up with [facility] in 1-2 weeks  Changed medication, do not take metoprolol tartate (Lopressor), instead start new medication chlorthalidone 25 mg daily for your blood pressure

## 2022-07-19 ENCOUNTER — TRANSITIONAL CARE MANAGEMENT (OUTPATIENT)
Dept: FAMILY MEDICINE CLINIC | Facility: CLINIC | Age: 77
End: 2022-07-19

## 2022-07-26 ENCOUNTER — TELEPHONE (OUTPATIENT)
Dept: FAMILY MEDICINE CLINIC | Facility: CLINIC | Age: 77
End: 2022-07-26

## 2022-07-26 NOTE — TELEPHONE ENCOUNTER
Fax received from [facility]  Copy of form scanned into encounter  Placed in white team fodler at nurse station

## 2022-08-08 ENCOUNTER — TELEPHONE (OUTPATIENT)
Dept: FAMILY MEDICINE CLINIC | Facility: CLINIC | Age: 77
End: 2022-08-08

## 2022-08-08 NOTE — TELEPHONE ENCOUNTER
We got home health orders to be signed from [facility]    Copy attached/media    Original left in white bin

## 2022-08-09 NOTE — TELEPHONE ENCOUNTER
Placed in PCP folder  Dr Radha Cox name needs to be crossed off the bottom of the pages and your name to replace his

## 2022-08-10 ENCOUNTER — TELEPHONE (OUTPATIENT)
Dept: FAMILY MEDICINE CLINIC | Facility: CLINIC | Age: 77
End: 2022-08-10

## 2022-08-10 NOTE — TELEPHONE ENCOUNTER
----- Message from Derick Harris sent at 8/10/2022  9:45 AM EDT -----  Regarding: TCM visit  Patient needing TCM follow-up:      Patient discharged from [address] [place] [address] to [facility] on 7/15/22 with primary diagnosis of Sepsis without acute organ dysfunction  Discharged from facility on 8/9/22 to home with home care  Please call patient to set up a TCM visit if you have not already done so  Thank you

## 2022-08-11 ENCOUNTER — RA CDI HCC (OUTPATIENT)
Dept: OTHER | Facility: HOSPITAL | Age: 77
End: 2022-08-11

## 2022-08-11 NOTE — PROGRESS NOTES
I50 9, I11 0, I73 9  [facility]  coding opportunities          Chart Reviewed number of suggestions sent to Provider: 3     Patients Insurance     Medicare Insurance: [employer]

## 2022-08-14 ENCOUNTER — APPOINTMENT (OUTPATIENT)
Dept: CT IMAGING | Facility: HOSPITAL | Age: 77
DRG: 190 | End: 2022-08-14
Payer: MEDICARE

## 2022-08-14 ENCOUNTER — HOSPITAL ENCOUNTER (INPATIENT)
Facility: HOSPITAL | Age: 77
LOS: 3 days | Discharge: NURSING, REHAB OR LONG-TERM CARE | DRG: 190 | End: 2022-08-18
Attending: EMERGENCY MEDICINE | Admitting: INTERNAL MEDICINE
Payer: MEDICARE

## 2022-08-14 ENCOUNTER — APPOINTMENT (EMERGENCY)
Dept: RADIOLOGY | Facility: HOSPITAL | Age: 77
DRG: 190 | End: 2022-08-14
Payer: MEDICARE

## 2022-08-14 DIAGNOSIS — J44.1 COPD EXACERBATION (HCC): Primary | ICD-10-CM

## 2022-08-14 DIAGNOSIS — J43.2 CENTRILOBULAR EMPHYSEMA (HCC): Chronic | ICD-10-CM

## 2022-08-14 DIAGNOSIS — J44.1 COPD WITH ACUTE EXACERBATION (HCC): ICD-10-CM

## 2022-08-14 PROBLEM — E87.2 LACTIC ACIDOSIS: Status: ACTIVE | Noted: 2022-08-14

## 2022-08-14 PROBLEM — J96.20 ACUTE ON CHRONIC RESPIRATORY FAILURE (HCC): Status: ACTIVE | Noted: 2018-03-29

## 2022-08-14 PROBLEM — R65.10 SIRS (SYSTEMIC INFLAMMATORY RESPONSE SYNDROME) (HCC): Status: ACTIVE | Noted: 2022-08-14

## 2022-08-14 PROBLEM — E87.20 LACTIC ACIDOSIS: Status: ACTIVE | Noted: 2022-08-14

## 2022-08-14 LAB
2HR DELTA HS TROPONIN: 1 NG/L
ALBUMIN SERPL BCP-MCNC: 4.1 G/DL (ref 3.5–5)
ALP SERPL-CCNC: 58 U/L (ref 34–104)
ALT SERPL W P-5'-P-CCNC: 16 U/L (ref 7–52)
ANION GAP SERPL CALCULATED.3IONS-SCNC: 9 MMOL/L (ref 4–13)
AST SERPL W P-5'-P-CCNC: 17 U/L (ref 13–39)
BASE EX.OXY STD BLDV CALC-SCNC: 89.2 % (ref 60–80)
BASE EXCESS BLDV CALC-SCNC: 2.9 MMOL/L
BASOPHILS # BLD AUTO: 0.04 THOUSANDS/ΜL (ref 0–0.1)
BASOPHILS NFR BLD AUTO: 1 % (ref 0–1)
BILIRUB SERPL-MCNC: 1.31 MG/DL (ref 0.2–1)
BUN SERPL-MCNC: 23 MG/DL (ref 5–25)
CALCIUM SERPL-MCNC: 8.8 MG/DL (ref 8.4–10.2)
CARDIAC TROPONIN I PNL SERPL HS: 10 NG/L
CARDIAC TROPONIN I PNL SERPL HS: 9 NG/L
CHLORIDE SERPL-SCNC: 102 MMOL/L (ref 96–108)
CO2 SERPL-SCNC: 27 MMOL/L (ref 21–32)
CREAT SERPL-MCNC: 1.1 MG/DL (ref 0.6–1.3)
EOSINOPHIL # BLD AUTO: 0.12 THOUSAND/ΜL (ref 0–0.61)
EOSINOPHIL NFR BLD AUTO: 2 % (ref 0–6)
ERYTHROCYTE [DISTWIDTH] IN BLOOD BY AUTOMATED COUNT: 15 % (ref 11.6–15.1)
FLUAV RNA RESP QL NAA+PROBE: NEGATIVE
FLUBV RNA RESP QL NAA+PROBE: NEGATIVE
GFR SERPL CREATININE-BSD FRML MDRD: 63 ML/MIN/1.73SQ M
GLUCOSE SERPL-MCNC: 152 MG/DL (ref 65–140)
HCO3 BLDV-SCNC: 28 MMOL/L (ref 24–30)
HCT VFR BLD AUTO: 37.4 % (ref 36.5–49.3)
HGB BLD-MCNC: 12.9 G/DL (ref 12–17)
IMM GRANULOCYTES # BLD AUTO: 0.12 THOUSAND/UL (ref 0–0.2)
IMM GRANULOCYTES NFR BLD AUTO: 2 % (ref 0–2)
LACTATE SERPL-SCNC: 2.2 MMOL/L (ref 0.5–2)
LACTATE SERPL-SCNC: 2.7 MMOL/L (ref 0.5–2)
LYMPHOCYTES # BLD AUTO: 2.16 THOUSANDS/ΜL (ref 0.6–4.47)
LYMPHOCYTES NFR BLD AUTO: 26 % (ref 14–44)
MCH RBC QN AUTO: 33 PG (ref 26.8–34.3)
MCHC RBC AUTO-ENTMCNC: 34.5 G/DL (ref 31.4–37.4)
MCV RBC AUTO: 96 FL (ref 82–98)
MONOCYTES # BLD AUTO: 0.66 THOUSAND/ΜL (ref 0.17–1.22)
MONOCYTES NFR BLD AUTO: 8 % (ref 4–12)
NEUTROPHILS # BLD AUTO: 5.14 THOUSANDS/ΜL (ref 1.85–7.62)
NEUTS SEG NFR BLD AUTO: 61 % (ref 43–75)
NRBC BLD AUTO-RTO: 0 /100 WBCS
O2 CT BLDV-SCNC: 16.6 ML/DL
PCO2 BLDV: 44.8 MM HG (ref 42–50)
PH BLDV: 7.41 [PH] (ref 7.3–7.4)
PLATELET # BLD AUTO: 145 THOUSANDS/UL (ref 149–390)
PMV BLD AUTO: 10.3 FL (ref 8.9–12.7)
PO2 BLDV: 61.8 MM HG (ref 35–45)
POTASSIUM SERPL-SCNC: 3.6 MMOL/L (ref 3.5–5.3)
PROCALCITONIN SERPL-MCNC: 0.13 NG/ML
PROT SERPL-MCNC: 6.6 G/DL (ref 6.4–8.4)
RBC # BLD AUTO: 3.91 MILLION/UL (ref 3.88–5.62)
RSV RNA RESP QL NAA+PROBE: NEGATIVE
SARS-COV-2 RNA RESP QL NAA+PROBE: NEGATIVE
SODIUM SERPL-SCNC: 138 MMOL/L (ref 135–147)
WBC # BLD AUTO: 8.24 THOUSAND/UL (ref 4.31–10.16)

## 2022-08-14 PROCEDURE — 36415 COLL VENOUS BLD VENIPUNCTURE: CPT | Performed by: EMERGENCY MEDICINE

## 2022-08-14 PROCEDURE — 99284 EMERGENCY DEPT VISIT MOD MDM: CPT | Performed by: EMERGENCY MEDICINE

## 2022-08-14 PROCEDURE — 94640 AIRWAY INHALATION TREATMENT: CPT

## 2022-08-14 PROCEDURE — G1004 CDSM NDSC: HCPCS

## 2022-08-14 PROCEDURE — 96365 THER/PROPH/DIAG IV INF INIT: CPT

## 2022-08-14 PROCEDURE — 82805 BLOOD GASES W/O2 SATURATION: CPT

## 2022-08-14 PROCEDURE — 94660 CPAP INITIATION&MGMT: CPT

## 2022-08-14 PROCEDURE — 94760 N-INVAS EAR/PLS OXIMETRY 1: CPT

## 2022-08-14 PROCEDURE — 94002 VENT MGMT INPAT INIT DAY: CPT

## 2022-08-14 PROCEDURE — 87040 BLOOD CULTURE FOR BACTERIA: CPT

## 2022-08-14 PROCEDURE — 84145 PROCALCITONIN (PCT): CPT | Performed by: INTERNAL MEDICINE

## 2022-08-14 PROCEDURE — 93005 ELECTROCARDIOGRAM TRACING: CPT

## 2022-08-14 PROCEDURE — 84484 ASSAY OF TROPONIN QUANT: CPT | Performed by: EMERGENCY MEDICINE

## 2022-08-14 PROCEDURE — 0241U HB NFCT DS VIR RESP RNA 4 TRGT: CPT

## 2022-08-14 PROCEDURE — 99285 EMERGENCY DEPT VISIT HI MDM: CPT

## 2022-08-14 PROCEDURE — 71045 X-RAY EXAM CHEST 1 VIEW: CPT

## 2022-08-14 PROCEDURE — 85025 COMPLETE CBC W/AUTO DIFF WBC: CPT | Performed by: EMERGENCY MEDICINE

## 2022-08-14 PROCEDURE — 83605 ASSAY OF LACTIC ACID: CPT

## 2022-08-14 PROCEDURE — 80053 COMPREHEN METABOLIC PANEL: CPT | Performed by: EMERGENCY MEDICINE

## 2022-08-14 PROCEDURE — 94664 DEMO&/EVAL PT USE INHALER: CPT

## 2022-08-14 PROCEDURE — 71275 CT ANGIOGRAPHY CHEST: CPT

## 2022-08-14 RX ORDER — ACETAMINOPHEN 325 MG/1
650 TABLET ORAL EVERY 6 HOURS PRN
Status: DISCONTINUED | OUTPATIENT
Start: 2022-08-14 | End: 2022-08-18 | Stop reason: HOSPADM

## 2022-08-14 RX ORDER — CHOLESTYRAMINE LIGHT 4 G/5.7G
4 POWDER, FOR SUSPENSION ORAL DAILY
Status: DISCONTINUED | OUTPATIENT
Start: 2022-08-15 | End: 2022-08-18 | Stop reason: HOSPADM

## 2022-08-14 RX ORDER — GUAIFENESIN 600 MG/1
1200 TABLET, EXTENDED RELEASE ORAL EVERY 12 HOURS SCHEDULED
Status: DISCONTINUED | OUTPATIENT
Start: 2022-08-14 | End: 2022-08-18 | Stop reason: HOSPADM

## 2022-08-14 RX ORDER — BENZONATATE 100 MG/1
200 CAPSULE ORAL 3 TIMES DAILY PRN
Status: DISCONTINUED | OUTPATIENT
Start: 2022-08-14 | End: 2022-08-17

## 2022-08-14 RX ORDER — LEVALBUTEROL 1.25 MG/.5ML
1.25 SOLUTION, CONCENTRATE RESPIRATORY (INHALATION)
Status: DISCONTINUED | OUTPATIENT
Start: 2022-08-14 | End: 2022-08-14

## 2022-08-14 RX ORDER — ALBUTEROL SULFATE 2.5 MG/3ML
5 SOLUTION RESPIRATORY (INHALATION) ONCE
Status: DISCONTINUED | OUTPATIENT
Start: 2022-08-14 | End: 2022-08-14

## 2022-08-14 RX ORDER — HYDRALAZINE HYDROCHLORIDE 20 MG/ML
5 INJECTION INTRAMUSCULAR; INTRAVENOUS EVERY 6 HOURS PRN
Status: DISCONTINUED | OUTPATIENT
Start: 2022-08-14 | End: 2022-08-18 | Stop reason: HOSPADM

## 2022-08-14 RX ORDER — BUDESONIDE 0.5 MG/2ML
0.5 INHALANT ORAL 2 TIMES DAILY
Status: DISCONTINUED | OUTPATIENT
Start: 2022-08-15 | End: 2022-08-15

## 2022-08-14 RX ORDER — PANTOPRAZOLE SODIUM 40 MG/1
40 TABLET, DELAYED RELEASE ORAL
Status: DISCONTINUED | OUTPATIENT
Start: 2022-08-15 | End: 2022-08-18 | Stop reason: HOSPADM

## 2022-08-14 RX ORDER — AMLODIPINE BESYLATE 10 MG/1
10 TABLET ORAL DAILY
Status: DISCONTINUED | OUTPATIENT
Start: 2022-08-15 | End: 2022-08-18 | Stop reason: HOSPADM

## 2022-08-14 RX ORDER — GUAIFENESIN 600 MG/1
600 TABLET, EXTENDED RELEASE ORAL EVERY 12 HOURS SCHEDULED
Status: DISCONTINUED | OUTPATIENT
Start: 2022-08-14 | End: 2022-08-14

## 2022-08-14 RX ORDER — SODIUM CHLORIDE FOR INHALATION 0.9 %
3 VIAL, NEBULIZER (ML) INHALATION
Status: DISCONTINUED | OUTPATIENT
Start: 2022-08-14 | End: 2022-08-14

## 2022-08-14 RX ORDER — FLUTICASONE PROPIONATE 50 MCG
2 SPRAY, SUSPENSION (ML) NASAL DAILY
Status: DISCONTINUED | OUTPATIENT
Start: 2022-08-15 | End: 2022-08-18 | Stop reason: HOSPADM

## 2022-08-14 RX ORDER — METHYLPREDNISOLONE SOD SUCC 125 MG
1 VIAL (EA) INJECTION ONCE
Status: COMPLETED | OUTPATIENT
Start: 2022-08-14 | End: 2022-08-14

## 2022-08-14 RX ORDER — MAGNESIUM SULFATE HEPTAHYDRATE 40 MG/ML
2 INJECTION, SOLUTION INTRAVENOUS ONCE
Status: COMPLETED | OUTPATIENT
Start: 2022-08-14 | End: 2022-08-14

## 2022-08-14 RX ORDER — ALBUTEROL SULFATE 90 UG/1
2 AEROSOL, METERED RESPIRATORY (INHALATION) EVERY 4 HOURS PRN
Status: DISCONTINUED | OUTPATIENT
Start: 2022-08-14 | End: 2022-08-18 | Stop reason: HOSPADM

## 2022-08-14 RX ORDER — LEVALBUTEROL 1.25 MG/.5ML
1.25 SOLUTION, CONCENTRATE RESPIRATORY (INHALATION) EVERY 6 HOURS
Status: DISCONTINUED | OUTPATIENT
Start: 2022-08-15 | End: 2022-08-15

## 2022-08-14 RX ORDER — SODIUM CHLORIDE, SODIUM GLUCONATE, SODIUM ACETATE, POTASSIUM CHLORIDE, MAGNESIUM CHLORIDE, SODIUM PHOSPHATE, DIBASIC, AND POTASSIUM PHOSPHATE .53; .5; .37; .037; .03; .012; .00082 G/100ML; G/100ML; G/100ML; G/100ML; G/100ML; G/100ML; G/100ML
500 INJECTION, SOLUTION INTRAVENOUS ONCE
Status: COMPLETED | OUTPATIENT
Start: 2022-08-14 | End: 2022-08-14

## 2022-08-14 RX ORDER — MAGNESIUM HYDROXIDE/ALUMINUM HYDROXICE/SIMETHICONE 120; 1200; 1200 MG/30ML; MG/30ML; MG/30ML
30 SUSPENSION ORAL EVERY 6 HOURS PRN
Status: DISCONTINUED | OUTPATIENT
Start: 2022-08-14 | End: 2022-08-18 | Stop reason: HOSPADM

## 2022-08-14 RX ORDER — BUSPIRONE HYDROCHLORIDE 5 MG/1
10 TABLET ORAL 3 TIMES DAILY
Status: DISCONTINUED | OUTPATIENT
Start: 2022-08-14 | End: 2022-08-18 | Stop reason: HOSPADM

## 2022-08-14 RX ORDER — GABAPENTIN 100 MG/1
200 CAPSULE ORAL 2 TIMES DAILY
Status: DISCONTINUED | OUTPATIENT
Start: 2022-08-14 | End: 2022-08-18 | Stop reason: HOSPADM

## 2022-08-14 RX ORDER — CHLORTHALIDONE 25 MG/1
25 TABLET ORAL DAILY
Status: DISCONTINUED | OUTPATIENT
Start: 2022-08-15 | End: 2022-08-18 | Stop reason: HOSPADM

## 2022-08-14 RX ORDER — ENOXAPARIN SODIUM 100 MG/ML
40 INJECTION SUBCUTANEOUS DAILY
Status: DISCONTINUED | OUTPATIENT
Start: 2022-08-15 | End: 2022-08-18 | Stop reason: HOSPADM

## 2022-08-14 RX ORDER — AZITHROMYCIN 250 MG/1
500 TABLET, FILM COATED ORAL EVERY 24 HOURS
Status: COMPLETED | OUTPATIENT
Start: 2022-08-15 | End: 2022-08-16

## 2022-08-14 RX ORDER — TAMSULOSIN HYDROCHLORIDE 0.4 MG/1
0.4 CAPSULE ORAL DAILY
Status: DISCONTINUED | OUTPATIENT
Start: 2022-08-15 | End: 2022-08-18 | Stop reason: HOSPADM

## 2022-08-14 RX ORDER — METHYLPREDNISOLONE SODIUM SUCCINATE 40 MG/ML
40 INJECTION, POWDER, LYOPHILIZED, FOR SOLUTION INTRAMUSCULAR; INTRAVENOUS EVERY 8 HOURS SCHEDULED
Status: DISCONTINUED | OUTPATIENT
Start: 2022-08-14 | End: 2022-08-16

## 2022-08-14 RX ORDER — SODIUM CHLORIDE FOR INHALATION 0.9 %
3 VIAL, NEBULIZER (ML) INHALATION ONCE
Status: COMPLETED | OUTPATIENT
Start: 2022-08-14 | End: 2022-08-14

## 2022-08-14 RX ORDER — FINASTERIDE 5 MG/1
5 TABLET, FILM COATED ORAL EVERY MORNING
Status: DISCONTINUED | OUTPATIENT
Start: 2022-08-15 | End: 2022-08-18 | Stop reason: HOSPADM

## 2022-08-14 RX ORDER — POLYETHYLENE GLYCOL 3350 17 G/17G
17 POWDER, FOR SOLUTION ORAL DAILY PRN
Status: DISCONTINUED | OUTPATIENT
Start: 2022-08-14 | End: 2022-08-18 | Stop reason: HOSPADM

## 2022-08-14 RX ORDER — AZITHROMYCIN 250 MG/1
500 TABLET, FILM COATED ORAL ONCE
Status: COMPLETED | OUTPATIENT
Start: 2022-08-14 | End: 2022-08-14

## 2022-08-14 RX ORDER — SODIUM CHLORIDE FOR INHALATION 0.9 %
3 VIAL, NEBULIZER (ML) INHALATION
Status: DISCONTINUED | OUTPATIENT
Start: 2022-08-15 | End: 2022-08-15

## 2022-08-14 RX ORDER — FORMOTEROL FUMARATE 20 UG/2ML
20 SOLUTION RESPIRATORY (INHALATION)
Status: DISCONTINUED | OUTPATIENT
Start: 2022-08-14 | End: 2022-08-18 | Stop reason: HOSPADM

## 2022-08-14 RX ORDER — IPRATROPIUM BROMIDE AND ALBUTEROL SULFATE .5; 3 MG/3ML; MG/3ML
2 SOLUTION RESPIRATORY (INHALATION) ONCE
Status: COMPLETED | OUTPATIENT
Start: 2022-08-14 | End: 2022-08-14

## 2022-08-14 RX ORDER — MIRTAZAPINE 15 MG/1
30 TABLET, FILM COATED ORAL
Status: DISCONTINUED | OUTPATIENT
Start: 2022-08-14 | End: 2022-08-18 | Stop reason: HOSPADM

## 2022-08-14 RX ADMIN — ALBUTEROL SULFATE 5 MG: 2.5 SOLUTION RESPIRATORY (INHALATION) at 20:10

## 2022-08-14 RX ADMIN — IOHEXOL 70 ML: 350 INJECTION, SOLUTION INTRAVENOUS at 22:25

## 2022-08-14 RX ADMIN — GUAIFENESIN 1200 MG: 600 TABLET ORAL at 22:37

## 2022-08-14 RX ADMIN — AZITHROMYCIN MONOHYDRATE 500 MG: 250 TABLET ORAL at 21:58

## 2022-08-14 RX ADMIN — LEVALBUTEROL 1.25 MG: 1.25 SOLUTION, CONCENTRATE RESPIRATORY (INHALATION) at 22:36

## 2022-08-14 RX ADMIN — IPRATROPIUM BROMIDE 0.5 MG: 0.5 SOLUTION RESPIRATORY (INHALATION) at 22:36

## 2022-08-14 RX ADMIN — SODIUM CHLORIDE, SODIUM GLUCONATE, SODIUM ACETATE, POTASSIUM CHLORIDE, MAGNESIUM CHLORIDE, SODIUM PHOSPHATE, DIBASIC, AND POTASSIUM PHOSPHATE 500 ML: .53; .5; .37; .037; .03; .012; .00082 INJECTION, SOLUTION INTRAVENOUS at 22:58

## 2022-08-14 RX ADMIN — ISODIUM CHLORIDE 3 ML: 0.03 SOLUTION RESPIRATORY (INHALATION) at 20:09

## 2022-08-14 RX ADMIN — MAGNESIUM SULFATE HEPTAHYDRATE 2 G: 40 INJECTION, SOLUTION INTRAVENOUS at 19:32

## 2022-08-14 RX ADMIN — IPRATROPIUM BROMIDE 0.5 MG: 0.5 SOLUTION RESPIRATORY (INHALATION) at 20:09

## 2022-08-14 RX ADMIN — ISODIUM CHLORIDE 3 ML: 0.03 SOLUTION RESPIRATORY (INHALATION) at 22:36

## 2022-08-14 NOTE — ED ATTENDING ATTESTATION
8/14/2022  IRyan DO, saw and evaluated the patient  I have discussed the patient with the resident/non-physician practitioner and agree with the resident's/non-physician practitioner's findings, Plan of Care, and MDM as documented in the resident's/non-physician practitioner's note, except where noted  All available labs and Radiology studies were reviewed  I was present for key portions of any procedure(s) performed by the resident/non-physician practitioner and I was immediately available to provide assistance  At this point I agree with the current assessment done in the Emergency Department  I have conducted an independent evaluation of this patient a history and physical is as follows:    25-year-old male with history of COPD coming into the ED by EMS for evaluation of shortness of breath and cough  EMS gave him an albuterol nebulizer treatment and 125 mg of Solu-Medrol prior to arrival and placed on CPAP  Upon arrival to the ED, respiratory therapy place him on BiPAP with improvement in his symptoms  However he still complains of shortness of breath and chest tightness  He states symptoms having increasing for about a week with cough and shortness of breath  On physical exam: pt is ill appearing, in moderate resp distress on bipap  mucous membranes moist   Fair air movement, moderate b/l wheezing, + accessory muscle use, heart tachycardic regular rhythm    Abdomen NT, ND, no R/R/G  Neuro intact, gcs 15  Cap refill < 2 sec, skin warm and dry  ED Course  ED Course as of 08/16/22 1449   Sun Aug 14, 2022   1952 Pt states he's feeling better  2054 Patient's respiratory status much improved this point  Will trial off the BiPAP           Critical Care Time  Procedures

## 2022-08-15 ENCOUNTER — APPOINTMENT (OUTPATIENT)
Dept: CT IMAGING | Facility: HOSPITAL | Age: 77
DRG: 190 | End: 2022-08-15
Payer: MEDICARE

## 2022-08-15 LAB
ANION GAP SERPL CALCULATED.3IONS-SCNC: 13 MMOL/L (ref 4–13)
BACTERIA UR QL AUTO: ABNORMAL /HPF
BASE EX.OXY STD BLDV CALC-SCNC: 86.9 % (ref 60–80)
BASE EXCESS BLDV CALC-SCNC: -2 MMOL/L
BASOPHILS # BLD AUTO: 0.02 THOUSANDS/ΜL (ref 0–0.1)
BASOPHILS NFR BLD AUTO: 0 % (ref 0–1)
BILIRUB UR QL STRIP: NEGATIVE
BUN SERPL-MCNC: 20 MG/DL (ref 5–25)
CALCIUM SERPL-MCNC: 8.6 MG/DL (ref 8.4–10.2)
CHLORIDE SERPL-SCNC: 101 MMOL/L (ref 96–108)
CLARITY UR: CLEAR
CO2 SERPL-SCNC: 25 MMOL/L (ref 21–32)
COLOR UR: ABNORMAL
CREAT SERPL-MCNC: 0.97 MG/DL (ref 0.6–1.3)
EOSINOPHIL # BLD AUTO: 0 THOUSAND/ΜL (ref 0–0.61)
EOSINOPHIL NFR BLD AUTO: 0 % (ref 0–6)
ERYTHROCYTE [DISTWIDTH] IN BLOOD BY AUTOMATED COUNT: 14.7 % (ref 11.6–15.1)
GFR SERPL CREATININE-BSD FRML MDRD: 74 ML/MIN/1.73SQ M
GLUCOSE SERPL-MCNC: 170 MG/DL (ref 65–140)
GLUCOSE UR STRIP-MCNC: ABNORMAL MG/DL
HCO3 BLDV-SCNC: 22.4 MMOL/L (ref 24–30)
HCT VFR BLD AUTO: 34.1 % (ref 36.5–49.3)
HGB BLD-MCNC: 11.7 G/DL (ref 12–17)
HGB UR QL STRIP.AUTO: ABNORMAL
IMM GRANULOCYTES # BLD AUTO: 0.1 THOUSAND/UL (ref 0–0.2)
IMM GRANULOCYTES NFR BLD AUTO: 1 % (ref 0–2)
KETONES UR STRIP-MCNC: NEGATIVE MG/DL
LACTATE SERPL-SCNC: 5.2 MMOL/L (ref 0.5–2)
LACTATE SERPL-SCNC: 6.1 MMOL/L (ref 0.5–2)
LACTATE SERPL-SCNC: 6.2 MMOL/L (ref 0.5–2)
LACTATE SERPL-SCNC: 7.3 MMOL/L (ref 0.5–2)
LEUKOCYTE ESTERASE UR QL STRIP: NEGATIVE
LYMPHOCYTES # BLD AUTO: 0.86 THOUSANDS/ΜL (ref 0.6–4.47)
LYMPHOCYTES NFR BLD AUTO: 12 % (ref 14–44)
MCH RBC QN AUTO: 32.9 PG (ref 26.8–34.3)
MCHC RBC AUTO-ENTMCNC: 34.3 G/DL (ref 31.4–37.4)
MCV RBC AUTO: 96 FL (ref 82–98)
MONOCYTES # BLD AUTO: 0.16 THOUSAND/ΜL (ref 0.17–1.22)
MONOCYTES NFR BLD AUTO: 2 % (ref 4–12)
NEUTROPHILS # BLD AUTO: 6.22 THOUSANDS/ΜL (ref 1.85–7.62)
NEUTS SEG NFR BLD AUTO: 85 % (ref 43–75)
NITRITE UR QL STRIP: NEGATIVE
NON-SQ EPI CELLS URNS QL MICRO: ABNORMAL /HPF
NRBC BLD AUTO-RTO: 0 /100 WBCS
O2 CT BLDV-SCNC: 14.4 ML/DL
PCO2 BLDV: 37.3 MM HG (ref 42–50)
PH BLDV: 7.4 [PH] (ref 7.3–7.4)
PH UR STRIP.AUTO: 5 [PH]
PLATELET # BLD AUTO: 124 THOUSANDS/UL (ref 149–390)
PMV BLD AUTO: 10.3 FL (ref 8.9–12.7)
PO2 BLDV: 55.4 MM HG (ref 35–45)
POTASSIUM SERPL-SCNC: 3.9 MMOL/L (ref 3.5–5.3)
PROCALCITONIN SERPL-MCNC: 0.13 NG/ML
PROT UR STRIP-MCNC: NEGATIVE MG/DL
RBC # BLD AUTO: 3.56 MILLION/UL (ref 3.88–5.62)
RBC #/AREA URNS AUTO: ABNORMAL /HPF
SODIUM SERPL-SCNC: 139 MMOL/L (ref 135–147)
SP GR UR STRIP.AUTO: 1.05 (ref 1–1.03)
UROBILINOGEN UR STRIP-ACNC: <2 MG/DL
WBC # BLD AUTO: 7.36 THOUSAND/UL (ref 4.31–10.16)
WBC #/AREA URNS AUTO: ABNORMAL /HPF

## 2022-08-15 PROCEDURE — 83605 ASSAY OF LACTIC ACID: CPT | Performed by: INTERNAL MEDICINE

## 2022-08-15 PROCEDURE — 80048 BASIC METABOLIC PNL TOTAL CA: CPT | Performed by: INTERNAL MEDICINE

## 2022-08-15 PROCEDURE — 74176 CT ABD & PELVIS W/O CONTRAST: CPT

## 2022-08-15 PROCEDURE — 99223 1ST HOSP IP/OBS HIGH 75: CPT | Performed by: INTERNAL MEDICINE

## 2022-08-15 PROCEDURE — 94760 N-INVAS EAR/PLS OXIMETRY 1: CPT

## 2022-08-15 PROCEDURE — 94640 AIRWAY INHALATION TREATMENT: CPT

## 2022-08-15 PROCEDURE — 85025 COMPLETE CBC W/AUTO DIFF WBC: CPT | Performed by: INTERNAL MEDICINE

## 2022-08-15 PROCEDURE — 81001 URINALYSIS AUTO W/SCOPE: CPT | Performed by: INTERNAL MEDICINE

## 2022-08-15 PROCEDURE — 99220 PR INITIAL OBSERVATION CARE/DAY 70 MINUTES: CPT | Performed by: INTERNAL MEDICINE

## 2022-08-15 PROCEDURE — 82805 BLOOD GASES W/O2 SATURATION: CPT | Performed by: INTERNAL MEDICINE

## 2022-08-15 PROCEDURE — 36415 COLL VENOUS BLD VENIPUNCTURE: CPT | Performed by: INTERNAL MEDICINE

## 2022-08-15 PROCEDURE — 84145 PROCALCITONIN (PCT): CPT | Performed by: INTERNAL MEDICINE

## 2022-08-15 RX ORDER — BUDESONIDE 0.5 MG/2ML
0.5 INHALANT ORAL
Status: DISCONTINUED | OUTPATIENT
Start: 2022-08-15 | End: 2022-08-18 | Stop reason: HOSPADM

## 2022-08-15 RX ORDER — CEFTRIAXONE 1 G/50ML
1000 INJECTION, SOLUTION INTRAVENOUS EVERY 24 HOURS
Status: DISCONTINUED | OUTPATIENT
Start: 2022-08-15 | End: 2022-08-16

## 2022-08-15 RX ORDER — LEVALBUTEROL 1.25 MG/.5ML
1.25 SOLUTION, CONCENTRATE RESPIRATORY (INHALATION)
Status: DISCONTINUED | OUTPATIENT
Start: 2022-08-15 | End: 2022-08-18 | Stop reason: HOSPADM

## 2022-08-15 RX ADMIN — BUSPIRONE HYDROCHLORIDE 10 MG: 10 TABLET ORAL at 21:58

## 2022-08-15 RX ADMIN — MIRTAZAPINE 30 MG: 15 TABLET, FILM COATED ORAL at 00:44

## 2022-08-15 RX ADMIN — ISODIUM CHLORIDE 3 ML: 0.03 SOLUTION RESPIRATORY (INHALATION) at 04:44

## 2022-08-15 RX ADMIN — GABAPENTIN 200 MG: 100 CAPSULE ORAL at 08:23

## 2022-08-15 RX ADMIN — BUSPIRONE HYDROCHLORIDE 10 MG: 10 TABLET ORAL at 08:45

## 2022-08-15 RX ADMIN — CEFTRIAXONE 1000 MG: 1 INJECTION, SOLUTION INTRAVENOUS at 04:43

## 2022-08-15 RX ADMIN — BUDESONIDE 0.5 MG: 0.5 INHALANT ORAL at 20:53

## 2022-08-15 RX ADMIN — SODIUM CHLORIDE, POTASSIUM CHLORIDE, SODIUM LACTATE AND CALCIUM CHLORIDE 1000 ML: 600; 310; 30; 20 INJECTION, SOLUTION INTRAVENOUS at 06:11

## 2022-08-15 RX ADMIN — MIRTAZAPINE 30 MG: 15 TABLET, FILM COATED ORAL at 21:58

## 2022-08-15 RX ADMIN — SODIUM CHLORIDE, POTASSIUM CHLORIDE, SODIUM LACTATE AND CALCIUM CHLORIDE 1000 ML: 600; 310; 30; 20 INJECTION, SOLUTION INTRAVENOUS at 05:41

## 2022-08-15 RX ADMIN — CARBIDOPA AND LEVODOPA 1 TABLET: 25; 100 TABLET ORAL at 21:58

## 2022-08-15 RX ADMIN — ENOXAPARIN SODIUM 40 MG: 40 INJECTION SUBCUTANEOUS at 08:24

## 2022-08-15 RX ADMIN — IPRATROPIUM BROMIDE 0.5 MG: 0.5 SOLUTION RESPIRATORY (INHALATION) at 08:02

## 2022-08-15 RX ADMIN — AZITHROMYCIN MONOHYDRATE 500 MG: 250 TABLET ORAL at 21:58

## 2022-08-15 RX ADMIN — GUAIFENESIN 1200 MG: 600 TABLET ORAL at 21:58

## 2022-08-15 RX ADMIN — METHYLPREDNISOLONE SODIUM SUCCINATE 40 MG: 40 INJECTION, POWDER, FOR SOLUTION INTRAMUSCULAR; INTRAVENOUS at 06:10

## 2022-08-15 RX ADMIN — BUSPIRONE HYDROCHLORIDE 10 MG: 10 TABLET ORAL at 17:14

## 2022-08-15 RX ADMIN — METHYLPREDNISOLONE SODIUM SUCCINATE 40 MG: 40 INJECTION, POWDER, FOR SOLUTION INTRAMUSCULAR; INTRAVENOUS at 14:37

## 2022-08-15 RX ADMIN — CARBIDOPA AND LEVODOPA 1 TABLET: 25; 100 TABLET ORAL at 00:44

## 2022-08-15 RX ADMIN — SODIUM CHLORIDE, POTASSIUM CHLORIDE, SODIUM LACTATE AND CALCIUM CHLORIDE 1000 ML: 600; 310; 30; 20 INJECTION, SOLUTION INTRAVENOUS at 04:42

## 2022-08-15 RX ADMIN — GABAPENTIN 200 MG: 100 CAPSULE ORAL at 17:14

## 2022-08-15 RX ADMIN — GUAIFENESIN 1200 MG: 600 TABLET ORAL at 08:23

## 2022-08-15 RX ADMIN — IPRATROPIUM BROMIDE 0.5 MG: 0.5 SOLUTION RESPIRATORY (INHALATION) at 13:39

## 2022-08-15 RX ADMIN — FINASTERIDE 5 MG: 5 TABLET, FILM COATED ORAL at 08:45

## 2022-08-15 RX ADMIN — METHYLPREDNISOLONE SODIUM SUCCINATE 40 MG: 40 INJECTION, POWDER, FOR SOLUTION INTRAMUSCULAR; INTRAVENOUS at 21:58

## 2022-08-15 RX ADMIN — LEVALBUTEROL HYDROCHLORIDE 1.25 MG: 1.25 SOLUTION, CONCENTRATE RESPIRATORY (INHALATION) at 08:02

## 2022-08-15 RX ADMIN — LEVALBUTEROL HYDROCHLORIDE 1.25 MG: 1.25 SOLUTION, CONCENTRATE RESPIRATORY (INHALATION) at 04:44

## 2022-08-15 RX ADMIN — IPRATROPIUM BROMIDE 0.5 MG: 0.5 SOLUTION RESPIRATORY (INHALATION) at 20:53

## 2022-08-15 RX ADMIN — CHOLESTYRAMINE 4 G: 4 POWDER, FOR SUSPENSION ORAL at 10:58

## 2022-08-15 RX ADMIN — CHLORTHALIDONE 25 MG: 25 TABLET ORAL at 08:45

## 2022-08-15 RX ADMIN — ISODIUM CHLORIDE 3 ML: 0.03 SOLUTION RESPIRATORY (INHALATION) at 01:19

## 2022-08-15 RX ADMIN — TAMSULOSIN HYDROCHLORIDE 0.4 MG: 0.4 CAPSULE ORAL at 08:23

## 2022-08-15 RX ADMIN — AMLODIPINE BESYLATE 10 MG: 10 TABLET ORAL at 08:23

## 2022-08-15 RX ADMIN — PANTOPRAZOLE SODIUM 40 MG: 40 TABLET, DELAYED RELEASE ORAL at 06:10

## 2022-08-15 RX ADMIN — FORMOTEROL FUMARATE DIHYDRATE 20 MCG: 20 SOLUTION RESPIRATORY (INHALATION) at 00:55

## 2022-08-15 RX ADMIN — LEVALBUTEROL HYDROCHLORIDE 1.25 MG: 1.25 SOLUTION, CONCENTRATE RESPIRATORY (INHALATION) at 13:39

## 2022-08-15 RX ADMIN — GABAPENTIN 200 MG: 100 CAPSULE ORAL at 00:43

## 2022-08-15 RX ADMIN — LEVALBUTEROL HYDROCHLORIDE 1.25 MG: 1.25 SOLUTION, CONCENTRATE RESPIRATORY (INHALATION) at 20:53

## 2022-08-15 RX ADMIN — BUSPIRONE HYDROCHLORIDE 10 MG: 10 TABLET ORAL at 00:43

## 2022-08-15 RX ADMIN — FLUTICASONE PROPIONATE 2 SPRAY: 50 SPRAY, METERED NASAL at 08:24

## 2022-08-15 RX ADMIN — FORMOTEROL FUMARATE DIHYDRATE 20 MCG: 20 SOLUTION RESPIRATORY (INHALATION) at 20:53

## 2022-08-15 NOTE — ED PROVIDER NOTES
History  Chief Complaint   Patient presents with    Shortness of Breath     Pt arrives via Ems with complaints of shortness of breath  Pt has COPD  Pt took 4 breathing treatments at home with no relief  EMS gave 125 mg of solumedrol and a breathing treatment in the truck, EMS also placed pt on CPAP       65year old male with PMH of COPD and CAD presented to the ED today via EMS with worsening shortness of breath  The patient reports his breathing has been worsening over the past few weeks but today was the worst it has ever been  EMS gave him albuterol nebulizer and Solu-Medrol and placed him on CPAP  He denies a productive cough and fever  He is complaint with his Duo-Neb and Pulmicort at home  In the ED, he was found to be tachycardic at 107, tachypneic at 32, and blood pressure of 182/90  Prior to Admission Medications   Prescriptions Last Dose Informant Patient Reported? Taking? OXYGEN-HELIUM IN  Self Yes No   Sig: Inhale 2L at rest- 3L with activity   Ventolin  (90 Base) MCG/ACT inhaler  Self No No   Sig: Inhale 2 puffs every 4 (four) hours as needed for wheezing   amLODIPine (NORVASC) 10 mg tablet  Self No No   Sig: TAKE ONE TABLET BY MOUTH DAILY    budesonide (PULMICORT) 0 5 mg/2 mL nebulizer solution   No No   Sig: Take 2 mL (0 5 mg total) by nebulization in the morning and 2 mL (0 5 mg total) in the evening  busPIRone (BUSPAR) 10 mg tablet   No No   Sig: TAKE 1 TABLET BY MOUTH 3 TIMES DAILY   carbidopa-levodopa (SINEMET)  mg per tablet   No No   Sig: take 1 tablet by mouth prior to each meal   chlorthalidone 25 mg tablet   No No   Sig: Take 1 tablet (25 mg total) by mouth daily   cholestyramine (QUESTRAN) 4 g packet   No No   Sig: Take 1 packet (4 g total) by mouth in the morning     finasteride (PROSCAR) 5 mg tablet  Self No No   Sig: TAKE ONE TABLET BY MOUTH IN THE MORNING    fluticasone (FLONASE) 50 mcg/act nasal spray  Self No No   Si sprays into each nostril daily gabapentin (NEURONTIN) 100 mg capsule   No No   Sig: Take 2 capsules (200 mg total) by mouth 2 (two) times a day   ipratropium-albuterol (DUO-NEB) 0 5-2 5 mg/3 mL nebulizer solution  Self No No   Sig: Take 3 mL by nebulization 4 (four) times a day   midodrine (PROAMATINE) 10 MG tablet   No No   Sig: Take 1 tab three times daily AS NEEDED if SBP less than 100  Please hold medication if blood pressure is above 108 systolic    mirtazapine (REMERON) 15 mg tablet   No No   Sig: Take 2 tablets (30 mg total) by mouth daily at bedtime   pantoprazole (PROTONIX) 40 mg tablet   No No   Sig: TAKE ONE TABLET BY MOUTH TWICE DAILY   predniSONE 10 mg tablet   No No   Sig: Take 4 tablets (40 mg total) by mouth daily for 7 days, THEN 3 tablets (30 mg total) daily for 7 days, THEN 2 tablets (20 mg total) daily for 7 days, THEN 1 tablet (10 mg total) daily  tamsulosin (FLOMAX) 0 4 mg   No No   Sig: TAKE ONE CAPSULE BY MOUTH ONE TIME DAILY      Facility-Administered Medications: None       Past Medical History:   Diagnosis Date    Anesthesia complication     Difficult to wake up    Arthritis     BPH (benign prostatic hyperplasia)     urinary frequency    Cancer (HCC)     basal cell neck, face    CKD (chronic kidney disease) stage 2, GFR 60-89 ml/min 07/04/2022    COPD (chronic obstructive pulmonary disease) (HCC)     COPD exacerbation ([facility] ) 12/29/2019    Coronary artery disease     Elevated PSA 10/25/2018    Full dentures     Hiatal hernia     History of methicillin resistant staphylococcus aureus (MRSA)     10/11/2018 MRSA (nares) positive    Hypertension     Irritable bowel syndrome     Kidney stone     at least 7 episodes    Liver disease     Alpha 1- enzyme deficiency - diagnosed 2002   has been on weekly replacement therapy since then    Pulmonary emphysema ([facility] )     1/25/15  FEV1 - 2 45 liters or 59% of predicted    RSV infection 12/2017    Wears glasses     for driving only       Past Surgical History: Procedure Laterality Date    BACK SURGERY  2008    discectomy    CARDIAC CATHETERIZATION N/A 5/3/2022    Procedure: Cardiac catheterization both left and right heart catheterization with vaso dilatory trial;  Surgeon: Deana Jara MD;  Location: [place] [address] CATH LAB; Service: Cardiology    CARDIAC CATHETERIZATION Left 5/3/2022    Procedure: Cardiac Left Heart Cath;  Surgeon: Deana Jara MD;  Location: [place] [address] CATH LAB; Service: Cardiology    CARDIAC CATHETERIZATION Left 5/3/2022    Procedure: Cardiac Left Ventriculogram;  Surgeon: Deana Jara MD;  Location: [place] [address] CATH LAB; Service: Cardiology    COLONOSCOPY      COLONOSCOPY N/A 3/10/2017    Procedure: Piperuarite Hylan;  Surgeon: Mercedes Meza MD;  Location: [place] [address] GI LAB; Service:    Suhail Comer      removal of kidney stones    ESOPHAGOGASTRODUODENOSCOPY N/A 3/10/2017    Procedure: ESOPHAGOGASTRODUODENOSCOPY (EGD); Surgeon: Mercedes Meza MD;  Location: [facility] GI LAB; Service:     LITHOTRIPSY      KS ESOPHAGOGASTRODUODENOSCOPY TRANSORAL DIAGNOSTIC N/A 2018    Procedure: ESOPHAGOGASTRODUODENOSCOPY (EGD); Surgeon: Mercedes Meza MD;  Location: [facility] GI LAB; Service: Gastroenterology    TONSILLECTOMY      VEIN LIGATION AND STRIPPING Bilateral     18's       Family History   Problem Relation Age of Onset    Emphysema Mother         never smoked    Emphysema Father     Cancer Brother         colon    Colon cancer Brother     Ulcerative colitis Family     Liver disease Family      I have reviewed and agree with the history as documented      E-Cigarette/Vaping    E-Cigarette Use Never User      E-Cigarette/Vaping Substances    Nicotine No     THC No     CBD No     Flavoring No     Other No     Unknown No      Social History     Tobacco Use    Smoking status: Former Smoker     Packs/day: 1 00     Years: 60 00     Pack years: 60 00     Quit date: 2017     Years since quittin 9    Smokeless tobacco: Never Used  Tobacco comment: quit in august 2017   Vaping Use    Vaping Use: Never used   Substance Use Topics    Alcohol use: Never     Comment: stopped in 2009    Drug use: Not Currently        Review of Systems   Constitutional: Negative for chills and fever  Respiratory: Positive for shortness of breath  Negative for cough  Cardiovascular: Negative for chest pain and palpitations  Gastrointestinal: Negative for abdominal pain, nausea and vomiting  Physical Exam  ED Triage Vitals   Temperature Pulse Respirations Blood Pressure SpO2   08/14/22 1904 08/14/22 1902 08/14/22 1903 08/14/22 1902 08/14/22 1902   (!) 97 4 °F (36 3 °C) (!) 107 (!) 32 (!) 182/90 99 %      Temp Source Heart Rate Source Patient Position - Orthostatic VS BP Location FiO2 (%)   08/14/22 1904 08/14/22 1902 08/14/22 1902 08/14/22 1902 --   Axillary Monitor Lying Right arm       Pain Score       08/14/22 1902       No Pain             Orthostatic Vital Signs  Vitals:    08/14/22 1902   BP: (!) 182/90   Pulse: (!) 107   Patient Position - Orthostatic VS: Lying       Physical Exam  Constitutional:       General: He is in acute distress  HENT:      Head: Normocephalic and atraumatic  Cardiovascular:      Rate and Rhythm: Regular rhythm  Tachycardia present  Pulses: Normal pulses  Heart sounds: Normal heart sounds  No murmur heard  Pulmonary:      Effort: Tachypnea and respiratory distress present  Breath sounds: Decreased breath sounds present  Abdominal:      General: There is no distension  Palpations: Abdomen is soft  Tenderness: There is no abdominal tenderness  Musculoskeletal:      Right lower leg: No edema  Left lower leg: No edema  Skin:     General: Skin is warm and dry  Neurological:      General: No focal deficit present  Mental Status: He is alert and oriented to person, place, and time           ED Medications  Medications   ipratropium (ATROVENT) 0 02 % inhalation solution 0 5 mg (has no administration in time range)     And   sodium chloride 0 9 % inhalation solution 3 mL (has no administration in time range)   magnesium sulfate 2 g/50 mL IVPB (premix) 2 g (2 g Intravenous New Bag 8/14/22 1932)   albuterol inhalation solution 5 mg (has no administration in time range)   albuterol inhalation solution 5 mg (has no administration in time range)   ipratropium-albuterol (FOR EMS ONLY) (DUO-NEB) 0 5-2 5 mg/3 mL inhalation solution 6 mL (0 mL Does not apply Given to EMS 8/14/22 1910)   methylPREDNISolone sodium succinate (FOR EMS ONLY) (Solu-MEDROL) 125 MG injection 125 mg (0 mg Does not apply Given to EMS 8/14/22 1911)       Diagnostic Studies  Results Reviewed     Procedure Component Value Units Date/Time    HS Troponin 0hr (reflex protocol) [[id number]]  (Normal) Collected: 08/14/22 1913    Lab Status: Final result Specimen: Blood from Arm, Left Updated: 08/14/22 1945     hs TnI 0hr 9 ng/L     HS Troponin I 2hr [[id number]]     Lab Status: No result Specimen: Blood     Comprehensive metabolic panel [[id number]]  (Abnormal) Collected: 08/14/22 1913    Lab Status: Final result Specimen: Blood from Arm, Left Updated: 08/14/22 1938     Sodium 138 mmol/L      Potassium 3 6 mmol/L      Chloride 102 mmol/L      CO2 27 mmol/L      ANION GAP 9 mmol/L      BUN 23 mg/dL      Creatinine 1 10 mg/dL      Glucose 152 mg/dL      Calcium 8 8 mg/dL      AST 17 U/L      ALT 16 U/L      Alkaline Phosphatase 58 U/L      Total Protein 6 6 g/dL      Albumin 4 1 g/dL      Total Bilirubin 1 31 mg/dL      eGFR 63 ml/min/1 73sq m     Narrative:      [facility] guidelines for Chronic Kidney Disease (CKD):     Stage 1 with normal or high GFR (GFR > 90 mL/min/1 73 square meters)    Stage 2 Mild CKD (GFR = 60-89 mL/min/1 73 square meters)    Stage 3A Moderate CKD (GFR = 45-59 mL/min/1 73 square meters)    Stage 3B Moderate CKD (GFR = 30-44 mL/min/1 73 square meters)    Stage 4 Severe CKD (GFR = 15-29 mL/min/1 73 square meters)    Stage 5 End Stage CKD (GFR <15 mL/min/1 73 square meters)  Note: GFR calculation is accurate only with a steady state creatinine    Blood gas, venous [[id number]]  (Abnormal) Collected: 08/14/22 1928    Lab Status: Final result Specimen: Blood from Arm, Right Updated: 08/14/22 1937     pH, Ralph 7 413     pCO2, Ralph 44 8 mm Hg      pO2, Ralph 61 8 mm Hg      HCO3, Ralph 28 0 mmol/L      Base Excess, Ralph 2 9 mmol/L      O2 Content, Ralph 16 6 ml/dL      O2 HGB, VENOUS 89 2 %     Blood culture #1 [[id number]] Collected: 08/14/22 1928    Lab Status: In process Specimen: Blood from Arm, Right Updated: 08/14/22 1936    Blood culture #2 [[id number]] Collected: 08/14/22 1928    Lab Status: In process Specimen: Blood from Arm, Left Updated: 08/14/22 1936    COVID/FLU/RSV [[id number]] Collected: 08/14/22 1928    Lab Status: In process Specimen: Nares from Nose Updated: 08/14/22 1934    Lactic acid, plasma [[id number]] Collected: 08/14/22 1928    Lab Status:  In process Specimen: Blood from Arm, Right Updated: 08/14/22 1934    CBC and differential [[id number]]  (Abnormal) Collected: 08/14/22 1913    Lab Status: Final result Specimen: Blood from Arm, Left Updated: 08/14/22 1921     WBC 8 24 Thousand/uL      RBC 3 91 Million/uL      Hemoglobin 12 9 g/dL      Hematocrit 37 4 %      MCV 96 fL      MCH 33 0 pg      MCHC 34 5 g/dL      RDW 15 0 %      MPV 10 3 fL      Platelets 027 Thousands/uL      nRBC 0 /100 WBCs      Neutrophils Relative 61 %      Immat GRANS % 2 %      Lymphocytes Relative 26 %      Monocytes Relative 8 %      Eosinophils Relative 2 %      Basophils Relative 1 %      Neutrophils Absolute 5 14 Thousands/µL      Immature Grans Absolute 0 12 Thousand/uL      Lymphocytes Absolute 2 16 Thousands/µL      Monocytes Absolute 0 66 Thousand/µL      Eosinophils Absolute 0 12 Thousand/µL      Basophils Absolute 0 04 Thousands/µL                  XR chest 1 view portable   ED Interpretation by Lin Suárez DO Diego (08/14 1942)   Hyperinflated lungs bilaterally            Procedures  Procedures      ED Course     MDM  Number of Diagnoses or Management Options  COPD exacerbation ([facility] [address] ): established and improving  Diagnosis management comments: 65year old male presented to the ED via EMS for likely COPD exacerbation  He was given 125 mg of solumedrol by EMS and started on CPAP  In the ED, he was started on BiPaP by respiratory therapy and started on GONZALEZ Neb  2 g of IV magnesium sulfate was given for additional bronchodilatory therapy  Blood cultures and lactate level were obtained  VBG was checked and showed normal findings  CXR was done and showed hyperinflated lungs bilaterally  He was given Zithromax 500 mg  He will be admitted to [place] [address] for COPD exacerbation  Amount and/or Complexity of Data Reviewed  Clinical lab tests: ordered    Risk of Complications, Morbidity, and/or Mortality  Presenting problems: moderate  Diagnostic procedures: low  Management options: moderate    Patient Progress  Patient progress: stable      Disposition  Final diagnoses:   [facility]     Time reflects when diagnosis was documented in both MDM as applicable and the Disposition within this note     Time User Action Codes Description Comment    8/14/2022  7:45 PM Storm Seek F Add [J44 1] COPD exacerbation [facility])       ED Disposition     ED Disposition   Admit    Condition   Stable    Date/Time   Sun Aug 14, 2022  7:46 PM    Comment   Case was discussed with  and the patient's admission status was agreed to be Admission Status: inpatient status to the service of Dr Taz Gorman    None         Patient's Medications   Discharge Prescriptions    No medications on file     No discharge procedures on file  PDMP Review       Value Time User    PDMP Reviewed  Yes 4/5/2022  9:56 AM Mariana Muñoz MD           ED Provider  Attending physically available and evaluated Wm Fournier I managed the patient along with the ED Attending      Electronically Signed by         Pierre Villalpando DO  08/14/22 2100       Pierre Villalpando DO  08/14/22 2101       Pierre Villalpando DO  08/14/22 2103

## 2022-08-15 NOTE — CONSULTS
Pulmonary Consultation   Francie Sun 65 y o  male MRN: [id number]  Unit/Bed#: ED 23 Encounter: [id number]      Reason for consultation: COPD exacerbation    Requesting physician: Heidi Christensen MD        Impressions/Recommendations:  1  Acute on chronic respiratory failure with hypoxia    · Patient noted to be tachycardic and tachypneic, requiring placement on BiPAP on arrival to ED  · Currently at baseline 3 L NC  · Continue monitoring respiratory status  · Continue management as below  · Continue incentive spirometry    2  Severe COPD with exacerbation  · At this time, notes improvement in symptoms since ED arrival yesterday  · Continue Xopenex and Atrovent  · Continue IV Solu-Medrol 40 mg q 8h  · Continue Pulmicort and Perforomist nebs  · Continue Abx at this time, will D/c if AM Procalcitonin negative  · PRN Albuterol  · Continue incentive spirometry  · Repeat AM Procalcitonin  · AM ECG to asses QTc interval as patient will likely need maintenance Azithromycin    3  Alpha-1 antitrypsin deficiency  · On weekly Zeimara infusion  · Last administered on 8/11  ·   4  Chronic pulmonary embolus  · Extending from left main pulm artery into left lower interlobar pulmonary artery unchanged from prior imaging, no signs of acute embolus  · Patient finished 6month course of Eliquis in 2018  · Thrombus currently stable  · Per oncology, if patient develops another PE will need lifelong coagulation      History of Present Illness   HPI:  Francie Sun is a pleasant 65 y o  male with a past medical history of COPD, alpha-1 antitrypsin deficiency, PE who presented to the hospital on 08/14 for evaluation of progressively worsening shortness of breath and cough  Rich reports multiple hospitalizations for COPD exacerbations over the past year with the last 1 approximately 1 month ago    After discharge, he spent 3 weeks in rehab and has been home for approximately 1 week during which time he has experienced progressively worsening shortness of breath associated with a 5 day history of fatigue, nonproductive cough, chills and rhinorrhea  He endorses an episode of diarrhea and states that he takes medications for IBS  He denies fevers, weight changes, night sweats, congestion, chest pain, abdominal tenderness, changes in urinary habits or lower extremity edema  Approximately 1d ago his SoB exacerbated further prompting him to come to the ED for further evaluation  He was given Albuterol nebulizer treatment and 125mg of SoluMedrol by EMS and placed on BiPAP on arrival to ED  CBC and CMP unremarkable, with Procalcitonin negative x2  Imaging showing emphysema, but no consolidations, chronic PE stable  Patient was admitted to the [facility] service for further management  Review of systems:  12 point review of systems was completed and was otherwise negative except as listed in HPI  Historical Information   Past Medical History:   Diagnosis Date    Anesthesia complication     Difficult to wake up    Arthritis     BPH (benign prostatic hyperplasia)     urinary frequency    Cancer (HCC)     basal cell neck, face    CKD (chronic kidney disease) stage 2, GFR 60-89 ml/min 07/04/2022    COPD (chronic obstructive pulmonary disease) (HCC)     COPD exacerbation (HCC) 12/29/2019    Coronary artery disease     Elevated PSA 10/25/2018    Full dentures     Hiatal hernia     History of methicillin resistant staphylococcus aureus (MRSA)     10/11/2018 MRSA (nares) positive    Hypertension     Irritable bowel syndrome     Kidney stone     at least 7 episodes    Liver disease     Alpha 1- enzyme deficiency - diagnosed 2002   has been on weekly replacement therapy since then    Parkinson disease ([address]     Pulmonary emphysema ([address] )     1/25/15  FEV1 - 2 45 liters or 59% of predicted    RSV infection 12/2017    Wears glasses     for driving only     Past Surgical History:   Procedure Laterality Date    BACK SURGERY  2008    discectomy    CARDIAC CATHETERIZATION N/A 5/3/2022    Procedure: Cardiac catheterization both left and right heart catheterization with vaso dilatory trial;  Surgeon: Kathy Story MD;  Location: [place] [address] CATH LAB; Service: Cardiology    CARDIAC CATHETERIZATION Left 5/3/2022    Procedure: Cardiac Left Heart Cath;  Surgeon: Kathy Story MD;  Location: [place] [address] CATH LAB; Service: Cardiology    CARDIAC CATHETERIZATION Left 5/3/2022    Procedure: Cardiac Left Ventriculogram;  Surgeon: Kathy Story MD;  Location: [place] [address] CATH LAB; Service: Cardiology    COLONOSCOPY      COLONOSCOPY N/A 3/10/2017    Procedure: [employer];  Surgeon: Cammy Truong MD;  Location: [place] [address] GI LAB; Service:    Terry Velasco      removal of kidney stones    ESOPHAGOGASTRODUODENOSCOPY N/A 3/10/2017    Procedure: ESOPHAGOGASTRODUODENOSCOPY (EGD); Surgeon: Cammy Truong MD;  Location: [facility] GI LAB; Service:     LITHOTRIPSY      NH ESOPHAGOGASTRODUODENOSCOPY TRANSORAL DIAGNOSTIC N/A 11/20/2018    Procedure: ESOPHAGOGASTRODUODENOSCOPY (EGD); Surgeon: Cammy Truong MD;  Location: [facility] GI LAB;   Service: Gastroenterology    TONSILLECTOMY      VEIN LIGATION AND STRIPPING Bilateral     17's     Family History   Problem Relation Age of Onset    Emphysema Mother         never smoked    Emphysema Father     Cancer Brother         colon    Colon cancer Brother     Ulcerative colitis Family     Liver disease Family        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    Tobacco history:  60 pack year smoking history, quit in 2017    Meds/Allergies   Current Facility-Administered Medications   Medication Dose Route Frequency    acetaminophen (TYLENOL) tablet 650 mg  650 mg Oral Q6H PRN    albuterol (PROVENTIL HFA,VENTOLIN HFA) inhaler 2 puff  2 puff Inhalation Q4H PRN    aluminum-magnesium hydroxide-simethicone (MYLANTA) oral suspension 30 mL  30 mL Oral Q6H PRN    amLODIPine (NORVASC) tablet 10 mg  10 mg Oral Daily    azithromycin (ZITHROMAX) tablet 500 mg  500 mg Oral Q24H    benzonatate (TESSALON PERLES) capsule 200 mg  200 mg Oral TID PRN    budesonide (PULMICORT) inhalation solution 0 5 mg  0 5 mg Nebulization Q12H    busPIRone (BUSPAR) tablet 10 mg  10 mg Oral TID    carbidopa-levodopa (SINEMET)  mg per tablet 1 tablet  1 tablet Oral HS    cefTRIAXone (ROCEPHIN) IVPB (premix in dextrose) 1,000 mg 50 mL  1,000 mg Intravenous Q24H    chlorthalidone tablet 25 mg  25 mg Oral Daily    cholestyramine sugar free (QUESTRAN LIGHT) packet 4 g  4 g Oral Daily    enoxaparin (LOVENOX) subcutaneous injection 40 mg  40 mg Subcutaneous Daily    finasteride (PROSCAR) tablet 5 mg  5 mg Oral QAM    fluticasone (FLONASE) 50 mcg/act nasal spray 2 spray  2 spray Nasal Daily    formoterol (PERFOROMIST) nebulizer solution 20 mcg  20 mcg Nebulization Q12H    gabapentin (NEURONTIN) capsule 200 mg  200 mg Oral BID    guaiFENesin (MUCINEX) 12 hr tablet 1,200 mg  1,200 mg Oral Q12H CORINA    hydrALAZINE (APRESOLINE) injection 5 mg  5 mg Intravenous Q6H PRN    iohexol (OMNIPAQUE) 240 MG/ML solution 50 mL  50 mL Oral Once in imaging    ipratropium (ATROVENT) 0 02 % inhalation solution 0 5 mg  0 5 mg Nebulization TID    levalbuterol (XOPENEX) inhalation solution 1 25 mg  1 25 mg Nebulization TID    methylPREDNISolone sodium succinate (Solu-MEDROL) injection 40 mg  40 mg Intravenous Q8H CORINA    mirtazapine (REMERON) tablet 30 mg  30 mg Oral HS    pantoprazole (PROTONIX) EC tablet 40 mg  40 mg Oral Early Morning    polyethylene glycol (MIRALAX) packet 17 g  17 g Oral Daily PRN    tamsulosin (FLOMAX) capsule 0 4 mg  0 4 mg Oral Daily     (Not in a hospital admission)    Allergies   Allergen Reactions    Chantix [Varenicline]      Caused prostate infection       Vitals: Blood pressure 163/86, pulse 74, temperature 97 7 °F (36 5 °C), temperature source Oral, resp   rate 17, height 6' 5" (1 956 m), weight 93 6 kg (206 lb 5 6 oz), SpO2 99 % , on 3L O2 via NC which is baseline, Body mass index is 24 47 kg/m²  Intake/Output Summary (Last 24 hours) at 8/15/2022 5340  Last data filed at 8/15/2022 0730  Gross per 24 hour   Intake 3580 ml   Output 900 ml   Net 2680 ml       Physical exam:    General Appearance:    Alert, cooperative, no conversational dyspnea or accessory     muscle use       Head/eyes:    Normocephalic, without obvious abnormality, atraumatic,         PERRL, extraocular muscles intact, no scleral icterus    Nose:   Nares normal, septum midline, mucosa normal, no drainage    or sinus tenderness   Throat:   Moist mucous membranes, no thrush   Neck:   Supple, trachea midline, no adenopathy; no carotid    bruit or JVD   Lungs:     Expiratory wheezes heard throughout, rhonchi appreciated in the mid lung fields  Chest Wall:    Symmetric, No tenderness or deformity    Heart:    Regular rate and rhythm, S1 and S2 normal, no murmur, rub   or gallop   Abdomen:     Soft, non-tender, +distension, bowel sounds active all four quadrants,  no masses, no organomegaly   Extremities:   Extremities normal, atraumatic, no cyanosis or edema   Skin:   Warm, dry, turgor normal, no rashes or lesions  Multiple ecchymoses noted on upper extremities bilaterally   Lymph nodes:   Cervical and supraclavicular nodes normal   Neurologic:   CNII-XII intact, normal strength, non-focal         Labs: I have personally reviewed pertinent lab results  , ABG: No results found for: PHART, XDP0LXP, PO2ART, XBW3YWH, U4NVZRUH, BEART, SOURCE, BNP: No results found for: BNP, CBC:   Lab Results   Component Value Date    WBC 7 36 08/15/2022    HGB 11 7 (L) 08/15/2022    HCT 34 1 (L) 08/15/2022    MCV 96 08/15/2022     (L) 08/15/2022    MCH 32 9 08/15/2022    MCHC 34 3 08/15/2022    RDW 14 7 08/15/2022    MPV 10 3 08/15/2022    NRBC 0 08/15/2022   , CMP:   Lab Results   Component Value Date    SODIUM 139 08/15/2022    K 3 9 08/15/2022     08/15/2022    CO2 25 08/15/2022    BUN 20 08/15/2022    CREATININE 0 97 08/15/2022    CALCIUM 8 6 08/15/2022    AST 17 2022    ALT 16 2022    ALKPHOS 58 2022    EGFR 74 08/15/2022   , PT/INR: No results found for: PT, INR, Troponin: No results found for: TROPONINI    Imaging and other studies: I have personally reviewed pertinent reports  and I have personally reviewed pertinent films in PACS    Pulmonary function testin PFTs:  FEV1: 40% predicted, 1 59 L   FVC:  65% predicted, 3 56 L  FEV1/FVC: 45%  Moderate obstructive airflow defect  Significant airway response to bronchodilator  Flow volume loop consistent with obstruction    EKG, Pathology, and Other Studies: I have personally reviewed pertinent reports     and I have personally reviewed pertinent films in PACS    Code Status: Level 1 - Full Code      Ryanne Hall MD

## 2022-08-15 NOTE — PLAN OF CARE
Problem: Potential for Falls  Goal: Patient will remain free of falls  Description: INTERVENTIONS:  - Educate patient/family on patient safety including physical limitations  - Instruct patient to call for assistance with activity   - Consult OT/PT to assist with strengthening/mobility   - Keep Call bell within reach  - Keep bed low and locked with side rails adjusted as appropriate  - Keep care items and personal belongings within reach  - Initiate and maintain comfort rounds  - Make Fall Risk Sign visible to staff  - Offer Toileting every  Hours, in advance of need  - Initiate/Maintain alarm  - Obtain necessary fall risk management equipment:   - Apply yellow socks and bracelet for high fall risk patients  - Consider moving patient to room near nurses station  Outcome: Progressing     Problem: MOBILITY - ADULT  Goal: Maintain or return to baseline ADL function  Description: INTERVENTIONS:  -  Assess patient's ability to carry out ADLs; assess patient's baseline for ADL function and identify physical deficits which impact ability to perform ADLs (bathing, care of mouth/teeth, toileting, grooming, dressing, etc )  - Assess/evaluate cause of self-care deficits   - Assess range of motion  - Assess patient's mobility; develop plan if impaired  - Assess patient's need for assistive devices and provide as appropriate  - Encourage maximum independence but intervene and supervise when necessary  - Involve family in performance of ADLs  - Assess for home care needs following discharge   - Consider OT consult to assist with ADL evaluation and planning for discharge  - Provide patient education as appropriate  Outcome: Progressing  Goal: Maintains/Returns to pre admission functional level  Description: INTERVENTIONS:  - Perform BMAT or MOVE assessment daily    - Set and communicate daily mobility goal to care team and patient/family/caregiver     - Collaborate with rehabilitation services on mobility goals if consulted  - Perform Range of Motion  times a day  - Reposition patient every  hours    - Dangle patient  times a day  - Stand patient  times a day  - Ambulate patient  times a day  - Out of bed to chair  times a day   - Out of bed for me times a day  - Out of bed for toileting  - Record patient progress and toleration of activity level   Outcome: Progressing     Problem: Prexisting or High Potential for Compromised Skin Integrity  Goal: Skin integrity is maintained or improved  Description: INTERVENTIONS:  - Identify patients at risk for skin breakdown  - Assess and monitor skin integrity  - Assess and monitor nutrition and hydration status  - Monitor labs   - Assess for incontinence   - Turn and reposition patient  - Assist with mobility/ambulation  - Relieve pressure over bony prominences  - Avoid friction and shearing  - Provide appropriate hygiene as needed including keeping skin clean and dry  - Evaluate need for skin moisturizer/barrier cream  - Collaborate with interdisciplinary team   - Patient/family teaching  - Consider wound care consult   Outcome: Progressing

## 2022-08-15 NOTE — QUICK NOTE
Patient with significant uprising lactic acidosis  CT chest with no signs of PNA  UA is clear  Patient has no GI symptoms  No signs of cellulitis  Initial lactic acidosis felt to be secondary to hypoxia, however this is improved but lactic acid continues to rise  He has no chest pain with negative troponin  Will start empirically on ceftriaxone for now while awaiting for Bcx  Start fluid resuscitation per sepsis protocol  Monitor closely for volume overload  Trend lactic acid until cleared <2  If continues to rise then will get CT abdomen/pelvis  Check VBG

## 2022-08-15 NOTE — ED NOTES
Pt completed 100% of breakfast tray at this time   SLIM now at [address] [facility], [facility]  08/15/22 3692

## 2022-08-15 NOTE — ASSESSMENT & PLAN NOTE
· Likely in the settings of hypoxia  No signs of infections     · Trend lactic acid level  · As above

## 2022-08-15 NOTE — ED NOTES
Pt breakfast tray arrived, sitting upright with no signs of distress noted        Padmini Ayala, EMY  08/15/22 5391

## 2022-08-15 NOTE — RESPIRATORY THERAPY NOTE
RT Protocol Note  Wm Fournier 65 y o  male MRN: [id number]  Unit/Bed#: ED 19 Encounter: [id number]    Assessment    Principal Problem:    COPD with acute exacerbation ([place] [address] )  Active Problems:    Alpha-1-antitrypsin deficiency ([place] [address] )    Chronic pulmonary embolism ([place] [address] )    Acute on chronic respiratory failure (HCC)    Depression, recurrent ([place] [address] )    Parkinson's disease ([place] [address] )    Hypertensive urgency    SIRS (systemic inflammatory response syndrome) ([facility])    Lactic acidosis      Home Pulmonary Medications:  Home Devices/Therapy: BiPAP/CPAP    Past Medical History:   Diagnosis Date    Anesthesia complication     Difficult to wake up    Arthritis     BPH (benign prostatic hyperplasia)     urinary frequency    Cancer ([facility])     basal cell neck, face    CKD (chronic kidney disease) stage 2, GFR 60-89 ml/min 2022    COPD (chronic obstructive pulmonary disease) ([facility])     COPD exacerbation ([place] [address] ) 2019    Coronary artery disease     Elevated PSA 10/25/2018    Full dentures     Hiatal hernia     History of methicillin resistant staphylococcus aureus (MRSA)     10/11/2018 MRSA (nares) positive    Hypertension     Irritable bowel syndrome     Kidney stone     at least 7 episodes    Liver disease     Alpha 1- enzyme deficiency - diagnosed   has been on weekly replacement therapy since then    Parkinson disease ([place] [address] )     Pulmonary emphysema ([place] [address] )     1/25/15  FEV1 - 2 45 liters or 59% of predicted    RSV infection 2017    Wears glasses     for driving only     Social History     Socioeconomic History    Marital status:       Spouse name: Not on file    Number of children: Not on file    Years of education: Not on file    Highest education level: Not on file   Occupational History    Not on file   Tobacco Use    Smoking status: Former Smoker     Packs/day: 1 00     Years: 60 00     Pack years: 60 00     Quit date: 2017     Years since quittin 9    Smokeless tobacco: Never Used  Tobacco comment: quit in august 2017   Vaping Use    Vaping Use: Never used   Substance and Sexual Activity    Alcohol use: Never     Comment: stopped in 2009    Drug use: Not Currently    Sexual activity: Not on file   Other Topics Concern    Not on file   Social History Narrative    Patient is   He has three adult children; 1 daughter and 2 sons  His daughter Daniela Arroyo lives closest [place] and is very involved w/ his care  Patient is not Mosque  He lives alone  Social Determinants of Health     Financial Resource Strain: Not on file   Food Insecurity: No Food Insecurity    Worried About Running Out of Food in the Last Year: Never true    Vi of Food in the Last Year: Never true   Transportation Needs: No Transportation Needs    Lack of Transportation (Medical): No    Lack of Transportation (Non-Medical): No   Physical Activity: Not on file   Stress: Not on file   Social Connections: Not on file   Intimate Partner Violence: Not on file   Housing Stability: Low Risk     Unable to Pay for Housing in the Last Year: No    Number of Places Lived in the Last Year: 1    Unstable Housing in the Last Year: No       Subjective         Objective    Physical Exam:   Assessment Type: During-treatment  General Appearance: Alert, Awake  Respiratory Pattern: Accessory muscle use, Tachypneic  Chest Assessment: Chest expansion asymmetrical  Bilateral Breath Sounds: Diminished    Vitals:  Blood pressure 165/86, pulse 70, temperature (!) 97 4 °F (36 3 °C), temperature source Axillary, resp  rate 16, SpO2 99 %            Imaging and other studies:           Plan             Resp Comments: hert neb given in line bipap

## 2022-08-15 NOTE — ASSESSMENT & PLAN NOTE
· Finished 6 months course of Eliquis per oncology back in 2018    · Per oncology note back then, if patient develops another acute pulmonary embolism then will need lifelong anticoagulation

## 2022-08-15 NOTE — ASSESSMENT & PLAN NOTE
· Likely in the setting of respiratory distress  · Continue home regimen of amlodipine and chlorthalidone  · Previously on metoprolol which was discontinued due to his recurrent COPD exacerbations  · Hold midodrine    · P r n  hydralazine for sustained systolic blood pressure over 180

## 2022-08-15 NOTE — ASSESSMENT & PLAN NOTE
· Chronically on 3 L oxygen secondary to centrilobular emphysema and alpha 1 antitrypsin deficiency  · Required BiPAP on admission in the settings of COPD exacerbation    · Will obtain CTA PE study  · Monitor oxygen requirements and keep SpO2 over 88%

## 2022-08-15 NOTE — ASSESSMENT & PLAN NOTE
· IV Solu-Medrol 40 mg every 8 hours  · Atrovent/Xopenex q i d  · Continue performist and Pulmicort  · Respiratory protocol, airway clearance, antitussives and incentive spirometry  · Azithromycin x3days  · Check procalcitonin and sputum cultures  Monitor off antibiotics apart from azithromycin for now  · Pulmonology consult considering recurrent admissions for COPD exacerbation in the last month    · Monitor oxygen requirements

## 2022-08-15 NOTE — ED NOTES
Pt started drinking PO contrast at this time   Verbalized understanding on instructions to finish by Jerry Collins, RN  08/15/22 [id number]

## 2022-08-15 NOTE — H&P
Amrit Rawls 20 1945, 65 y o  male MRN: [id number]  Unit/Bed#: ED 19 Encounter: [id number]  Primary Care Provider: Jocelyn Houston MD   Date and time admitted to hospital: 8/14/2022  6:57 PM    Addendum:  Patient with significant uprising lactic acidosis  CT chest with no signs of PNA  UA is clear  Patient has no GI symptoms  No signs of cellulitis  Initial lactic acidosis felt to be secondary to hypoxia, however this is improved but lactic acid continues to rise  He has no chest pain with negative troponin  Will start empirically on ceftriaxone for now while awaiting for Bcx  Start fluid resuscitation per sepsis protocol  Monitor closely for volume overload  Trend lactic acid until cleared <2  If continues to rise then will get CT abdomen/pelvis  Check VBG  * COPD with acute exacerbation (HCC)  Assessment & Plan  · IV Solu-Medrol 40 mg every 8 hours  · Atrovent/Xopenex q i d  · Continue performist and Pulmicort  · Respiratory protocol, airway clearance, antitussives and incentive spirometry  · Azithromycin x3days  · Check procalcitonin and sputum cultures  Monitor off antibiotics apart from azithromycin for now  · Pulmonology consult considering recurrent admissions for COPD exacerbation in the last month  · Monitor oxygen requirements    Acute on chronic respiratory failure (HCC)  Assessment & Plan  · Chronically on 3 L oxygen secondary to centrilobular emphysema and alpha 1 antitrypsin deficiency  · Required BiPAP on admission in the settings of COPD exacerbation  · Will obtain CTA PE study  · Monitor oxygen requirements and keep SpO2 over 88%    Chronic pulmonary embolism (HCC)  Assessment & Plan  · Finished 6 months course of Eliquis per oncology back in 2018    · Per oncology note back then, if patient develops another acute pulmonary embolism then will need lifelong anticoagulation    Alpha-1-antitrypsin deficiency ([facility] [address] )  Assessment & Plan  · On Zemaira infusion per pulmonology      SIRS (systemic inflammatory response syndrome) (HCC)  Assessment & Plan  · POA:  Tachypnea and tachycardia likely reactive to respiratory distress due to COPD exacerbation  Currently afebrile with no leukocytosis  · Chest x-ray with no signs of infiltrates - pending official read  · Azithromycin x3 doses for COPD exacerbation otherwise monitor off antibiotics for now  · Check UA, sputum and blood cultures    Lactic acidosis  Assessment & Plan  · Likely in the settings of hypoxia  No signs of infections  · Trend lactic acid level  · As above    Hypertensive urgency  Assessment & Plan  · Likely in the setting of respiratory distress  · Continue home regimen of amlodipine and chlorthalidone  · Previously on metoprolol which was discontinued due to his recurrent COPD exacerbations  · Hold midodrine  · P r n  hydralazine for sustained systolic blood pressure over 180    Parkinson's disease ([facility] [address] )  Assessment & Plan  · Continue Sinemet    Depression, recurrent ([facility])  Assessment & Plan  · Continue home regimen Buspar    VTE Prophylaxis: Enoxaparin (Lovenox)  / sequential compression device   Code Status:  Full code  POLST: POLST form is not discussed and not completed at this time  Anticipated Length of Stay:  Patient will be admitted on an Observation basis with an anticipated length of stay of  less than 2 midnights  Justification for Hospital Stay:  IV steroids and pulmonology consult    Chief Complaint:   Shortness of breath    History of Present Illness:    Yun Gonzalez is a 65 y o  male with past medical history of chronic respiratory failure on 3 L oxygen, centrilobular emphysema, alpha-1 antitrypsin deficiency on Zemaira infusion, chronic pulmonary embolism who presents with worsening cough and shortness of breath for the last week  He denies chest pain, fever or palpitations  He had multiple admissions for COPD exacerbation this year    Patient was in severe respiratory distress on admission requiring BiPAP  Chest x-ray with no significant infiltrates pending official read  Patient will be admitted under IM service further management of COPD exacerbation with IV steroids, scheduled nebs and pulmonology consult      Review of Systems:    Review of Systems   Constitutional: Negative for chills and fever  HENT: Negative for ear pain and sore throat  Eyes: Negative for pain and visual disturbance  Respiratory: Positive for cough and shortness of breath  Cardiovascular: Negative for chest pain and palpitations  Gastrointestinal: Negative for abdominal pain and vomiting  Genitourinary: Negative for dysuria and hematuria  Musculoskeletal: Negative for arthralgias and back pain  Skin: Negative for color change and rash  Neurological: Negative for seizures and syncope  Psychiatric/Behavioral: Negative for agitation and confusion  All other systems reviewed and are negative  Past Medical and Surgical History:     Past Medical History:   Diagnosis Date    Anesthesia complication     Difficult to wake up    Arthritis     BPH (benign prostatic hyperplasia)     urinary frequency    Cancer (HCC)     basal cell neck, face    CKD (chronic kidney disease) stage 2, GFR 60-89 ml/min 07/04/2022    COPD (chronic obstructive pulmonary disease) (HCC)     COPD exacerbation (HCC) 12/29/2019    Coronary artery disease     Elevated PSA 10/25/2018    Full dentures     Hiatal hernia     History of methicillin resistant staphylococcus aureus (MRSA)     10/11/2018 MRSA (nares) positive    Hypertension     Irritable bowel syndrome     Kidney stone     at least 7 episodes    Liver disease     Alpha 1- enzyme deficiency - diagnosed 2002   has been on weekly replacement therapy since then    Pulmonary emphysema ([facility] [address] )     1/25/15  FEV1 - 2 45 liters or 59% of predicted    RSV infection 12/2017    Wears glasses     for driving only       Past Surgical History:   Procedure Laterality Date    BACK SURGERY  2008    discectomy    CARDIAC CATHETERIZATION N/A 5/3/2022    Procedure: Cardiac catheterization both left and right heart catheterization with vaso dilatory trial;  Surgeon: Ina Armijo MD;  Location: [place] [address] CATH LAB; Service: Cardiology    CARDIAC CATHETERIZATION Left 5/3/2022    Procedure: Cardiac Left Heart Cath;  Surgeon: Ina Armijo MD;  Location: [place] [address] CATH LAB; Service: Cardiology    CARDIAC CATHETERIZATION Left 5/3/2022    Procedure: Cardiac Left Ventriculogram;  Surgeon: Ina Armijo MD;  Location: [place] [address] CATH LAB; Service: Cardiology    COLONOSCOPY      COLONOSCOPY N/A 3/10/2017    Procedure: Blanchie Grippe;  Surgeon: Breann Sapp MD;  Location: [facility] GI LAB; Service:    Don Henning      removal of kidney stones    ESOPHAGOGASTRODUODENOSCOPY N/A 3/10/2017    Procedure: ESOPHAGOGASTRODUODENOSCOPY (EGD); Surgeon: Breann Sapp MD;  Location: [facility] GI LAB; Service:     LITHOTRIPSY      TX ESOPHAGOGASTRODUODENOSCOPY TRANSORAL DIAGNOSTIC N/A 11/20/2018    Procedure: ESOPHAGOGASTRODUODENOSCOPY (EGD); Surgeon: Breann Sapp MD;  Location: [facility] GI LAB; Service: Gastroenterology    TONSILLECTOMY      VEIN LIGATION AND STRIPPING Bilateral     1980's       Meds/Allergies:    Prior to Admission medications    Medication Sig Start Date End Date Taking?  Authorizing Provider   amLODIPine (NORVASC) 10 mg tablet TAKE ONE TABLET BY MOUTH DAILY  11/5/21   Kelli Kimbrough MD   budesonide (PULMICORT) 0 5 mg/2 mL nebulizer solution Take 2 mL (0 5 mg total) by nebulization in the morning and 2 mL (0 5 mg total) in the evening  5/11/22 8/9/22  TOPHER Mosley   busPIRone (BUSPAR) 10 mg tablet TAKE 1 TABLET BY MOUTH 3 TIMES DAILY 7/5/22   Brianda Lazcano MD   carbidopa-levodopa (SINEMET)  mg per tablet take 1 tablet by mouth prior to each meal 7/5/22   Lake Hyde MD   chlorthalidone 25 mg tablet Take 1 tablet (25 mg total) by mouth daily 7/15/22 10/13/22  Ruchi Arreola MD   cholestyramine Lukasz Gal) 4 g packet Take 1 packet (4 g total) by mouth in the morning  5/11/22   TOPHER Mosley   finasteride (PROSCAR) 5 mg tablet TAKE ONE TABLET BY MOUTH IN THE MORNING  10/21/21   Albin Watkins MD   fluticasone [facility]) 50 mcg/act nasal spray 2 sprays into each nostril daily 7/6/20   Karissa Hernandez MD   gabapentin (NEURONTIN) 100 mg capsule Take 2 capsules (200 mg total) by mouth 2 (two) times a day 6/22/22   TOPHER Mosley   ipratropium-albuterol (DUO-NEB) 0 5-2 5 mg/3 mL nebulizer solution Take 3 mL by nebulization 4 (four) times a day 8/11/21   TOPHER Ruby   midodrine (PROAMATINE) 10 MG tablet Take 1 tab three times daily AS NEEDED if SBP less than 100  Please hold medication if blood pressure is above 910 systolic  7/02/78   TOPHER Mosley   mirtazapine (REMERON) 15 mg tablet Take 2 tablets (30 mg total) by mouth daily at bedtime 5/23/22   Vickie Ochoa,    OXYGEN-HELIUM IN Inhale 2L at rest- 3L with activity    Historical Provider, MD   pantoprazole (PROTONIX) 40 mg tablet TAKE ONE TABLET BY MOUTH TWICE DAILY 6/1/22   Wei Jackson MD   predniSONE 10 mg tablet Take 4 tablets (40 mg total) by mouth daily for 7 days, THEN 3 tablets (30 mg total) daily for 7 days, THEN 2 tablets (20 mg total) daily for 7 days, THEN 1 tablet (10 mg total) daily  7/9/22 8/29/22  Ivette Armijo,    tamsulosin (FLOMAX) 0 4 mg TAKE ONE CAPSULE BY MOUTH ONE TIME DAILY 5/12/22   Wei Jackson MD   Ventolin  (78 Base) MCG/ACT inhaler Inhale 2 puffs every 4 (four) hours as needed for wheezing 11/8/21   David Warren PA-C     I have reviewed home medications with patient personally  Allergies: Allergies   Allergen Reactions    Chantix [Varenicline]      Caused prostate infection       Social History:     Marital Status:     Occupation: N/A  Patient Pre-hospital Living Situation:  Home by himself  Patient Pre-hospital Level of Mobility:  Use cane or a walker occasionally  Patient Pre-hospital Diet Restrictions:  None  Substance Use History:   Social History     Substance and Sexual Activity   Alcohol Use Never    Comment: stopped in      Social History     Tobacco Use   Smoking Status Former Smoker    Packs/day: 1 00    Years: 60 00    Pack years: 60 00    Quit date: 2017    Years since quittin 9   Smokeless Tobacco Never Used   Tobacco Comment    quit in 2017     Social History     Substance and Sexual Activity   Drug Use Not Currently       Family History:    Family History   Problem Relation Age of Onset    Emphysema Mother         never smoked    Emphysema Father     Cancer Brother         colon    Colon cancer Brother     Ulcerative colitis Family     Liver disease Family        Physical Exam:     Vitals:   Blood Pressure: 170/91 (220)  Pulse: 82 (22)  Temperature: (!) 97 4 °F (36 3 °C) (22)  Temp Source: Axillary (22)  Respirations: 20 (22)  SpO2: 98 % (22)    Physical Exam  Vitals and nursing note reviewed  Constitutional:       General: He is not in acute distress  Appearance: He is normal weight  He is not ill-appearing, toxic-appearing or diaphoretic  HENT:      Head: Normocephalic and atraumatic  Mouth/Throat:      Mouth: Mucous membranes are moist    Cardiovascular:      Rate and Rhythm: Regular rhythm  Tachycardia present  Pulses: Normal pulses  Pulmonary:      Breath sounds: Wheezing present  No rales  Comments: Conversational dyspnea  Decreased breath sounds throughout  Abdominal:      General: Bowel sounds are normal       Palpations: Abdomen is soft  Tenderness: There is no abdominal tenderness  Musculoskeletal:      Cervical back: Normal range of motion and neck supple  Right lower leg: Edema (Trace) present  Left lower leg: Edema present  Skin:     General: Skin is warm and dry  Neurological:      General: No focal deficit present  Mental Status: He is alert and oriented to person, place, and time  Psychiatric:         Mood and Affect: Mood normal          Behavior: Behavior normal              Additional Data:     Lab Results: I have personally reviewed pertinent reports  Results from last 7 days   Lab Units 08/14/22 1913   WBC Thousand/uL 8 24   HEMOGLOBIN g/dL 12 9   HEMATOCRIT % 37 4   PLATELETS Thousands/uL 145*   NEUTROS PCT % 61   LYMPHS PCT % 26   MONOS PCT % 8   EOS PCT % 2     Results from last 7 days   Lab Units 08/14/22 1913   POTASSIUM mmol/L 3 6   CHLORIDE mmol/L 102   CO2 mmol/L 27   BUN mg/dL 23   CREATININE mg/dL 1 10   CALCIUM mg/dL 8 8   ALK PHOS U/L 58   ALT U/L 16   AST U/L 17           Imaging: I have personally reviewed pertinent reports  CTA chest pe study    Result Date: 8/14/2022  Narrative: CTA - CHEST WITH IV CONTRAST - PULMONARY ANGIOGRAM INDICATION:   Acute hypoxia  COMPARISON: Multiple priors most recently same day chest radiograph TECHNIQUE: CTA examination of the chest was performed using angiographic technique according to a protocol specifically tailored to evaluate for pulmonary embolism  Axial, sagittal, and coronal 2D reformatted images were created from the source data and  submitted for interpretation  In addition, coronal 3D MIP postprocessing was performed on the acquisition scanner  Radiation dose length product (DLP) for this visit:  671 mGy-cm   This examination, like all CT scans performed in the [facility], was performed utilizing techniques to minimize radiation dose exposure, including the use of iterative reconstruction and automated exposure control  IV Contrast:  70 mL of iohexol (OMNIPAQUE)  FINDINGS: PULMONARY ARTERIAL TREE:  Chronic pulmonary embolus extending from the left main pulmonary artery into left lower interlobar pulmonary artery    This is unchanged from prior examination  No acute pulmonary embolus identified  LUNGS:  Emphysema  PLEURA:  Unremarkable  HEART/GREAT VESSELS:  Thoracic aortic and coronary artery atherosclerosis  No thoracic aortic aneurysm  MEDIASTINUM AND MIKE:  Unremarkable  CHEST WALL AND LOWER NECK:   Unremarkable  VISUALIZED STRUCTURES IN THE UPPER ABDOMEN:  Unremarkable  OSSEOUS STRUCTURES:  Spinal degenerative changes are noted  No acute fracture or destructive osseous lesion  Impression: No acute pulmonary embolus identified  No acute inflammatory process  Chronic pulmonary embolus left main pulmonary artery unchanged  Emphysema  Workstation performed: [id number]       EKG, Pathology, and Other Studies Reviewed on Admission:   · EKG:  Junctional rhythm    Epic / Care Everywhere Records Reviewed: Yes     ** Please Note: This note has been constructed using a voice recognition system   **

## 2022-08-15 NOTE — ASSESSMENT & PLAN NOTE
· POA:  Tachypnea and tachycardia likely reactive to respiratory distress due to COPD exacerbation  Currently afebrile with no leukocytosis  · Chest x-ray with no signs of infiltrates - pending official read  · Azithromycin x3 doses for COPD exacerbation otherwise monitor off antibiotics for now     · Check UA, sputum and blood cultures

## 2022-08-16 LAB
ATRIAL RATE: 127 BPM
PROCALCITONIN SERPL-MCNC: 0.09 NG/ML
QRS AXIS: 82 DEGREES
QRSD INTERVAL: 94 MS
QT INTERVAL: 330 MS
QTC INTERVAL: 432 MS
T WAVE AXIS: 60 DEGREES
VENTRICULAR RATE: 103 BPM

## 2022-08-16 PROCEDURE — 84145 PROCALCITONIN (PCT): CPT

## 2022-08-16 PROCEDURE — 93010 ELECTROCARDIOGRAM REPORT: CPT | Performed by: INTERNAL MEDICINE

## 2022-08-16 PROCEDURE — 97163 PT EVAL HIGH COMPLEX 45 MIN: CPT

## 2022-08-16 PROCEDURE — 94640 AIRWAY INHALATION TREATMENT: CPT

## 2022-08-16 PROCEDURE — 94760 N-INVAS EAR/PLS OXIMETRY 1: CPT

## 2022-08-16 PROCEDURE — 99232 SBSQ HOSP IP/OBS MODERATE 35: CPT | Performed by: INTERNAL MEDICINE

## 2022-08-16 PROCEDURE — 97167 OT EVAL HIGH COMPLEX 60 MIN: CPT

## 2022-08-16 RX ORDER — METHYLPREDNISOLONE SODIUM SUCCINATE 40 MG/ML
40 INJECTION, POWDER, LYOPHILIZED, FOR SOLUTION INTRAMUSCULAR; INTRAVENOUS EVERY 12 HOURS SCHEDULED
Status: COMPLETED | OUTPATIENT
Start: 2022-08-16 | End: 2022-08-17

## 2022-08-16 RX ADMIN — IPRATROPIUM BROMIDE 0.5 MG: 0.5 SOLUTION RESPIRATORY (INHALATION) at 19:32

## 2022-08-16 RX ADMIN — CHLORTHALIDONE 25 MG: 25 TABLET ORAL at 09:29

## 2022-08-16 RX ADMIN — LEVALBUTEROL HYDROCHLORIDE 1.25 MG: 1.25 SOLUTION, CONCENTRATE RESPIRATORY (INHALATION) at 13:55

## 2022-08-16 RX ADMIN — METHYLPREDNISOLONE SODIUM SUCCINATE 40 MG: 40 INJECTION, POWDER, FOR SOLUTION INTRAMUSCULAR; INTRAVENOUS at 17:23

## 2022-08-16 RX ADMIN — BUDESONIDE 0.5 MG: 0.5 INHALANT ORAL at 07:26

## 2022-08-16 RX ADMIN — FORMOTEROL FUMARATE DIHYDRATE 20 MCG: 20 SOLUTION RESPIRATORY (INHALATION) at 19:32

## 2022-08-16 RX ADMIN — CARBIDOPA AND LEVODOPA 1 TABLET: 25; 100 TABLET ORAL at 21:43

## 2022-08-16 RX ADMIN — TAMSULOSIN HYDROCHLORIDE 0.4 MG: 0.4 CAPSULE ORAL at 09:27

## 2022-08-16 RX ADMIN — LEVALBUTEROL HYDROCHLORIDE 1.25 MG: 1.25 SOLUTION, CONCENTRATE RESPIRATORY (INHALATION) at 07:26

## 2022-08-16 RX ADMIN — FLUTICASONE PROPIONATE 2 SPRAY: 50 SPRAY, METERED NASAL at 09:29

## 2022-08-16 RX ADMIN — ALBUTEROL SULFATE 2 PUFF: 90 AEROSOL, METERED RESPIRATORY (INHALATION) at 18:38

## 2022-08-16 RX ADMIN — GABAPENTIN 200 MG: 100 CAPSULE ORAL at 17:23

## 2022-08-16 RX ADMIN — BUSPIRONE HYDROCHLORIDE 10 MG: 10 TABLET ORAL at 20:40

## 2022-08-16 RX ADMIN — PANTOPRAZOLE SODIUM 40 MG: 40 TABLET, DELAYED RELEASE ORAL at 05:28

## 2022-08-16 RX ADMIN — MIRTAZAPINE 30 MG: 15 TABLET, FILM COATED ORAL at 21:43

## 2022-08-16 RX ADMIN — IPRATROPIUM BROMIDE 0.5 MG: 0.5 SOLUTION RESPIRATORY (INHALATION) at 07:26

## 2022-08-16 RX ADMIN — BUSPIRONE HYDROCHLORIDE 10 MG: 10 TABLET ORAL at 15:32

## 2022-08-16 RX ADMIN — AZITHROMYCIN MONOHYDRATE 500 MG: 250 TABLET ORAL at 20:40

## 2022-08-16 RX ADMIN — LEVALBUTEROL HYDROCHLORIDE 1.25 MG: 1.25 SOLUTION, CONCENTRATE RESPIRATORY (INHALATION) at 19:33

## 2022-08-16 RX ADMIN — GABAPENTIN 200 MG: 100 CAPSULE ORAL at 09:27

## 2022-08-16 RX ADMIN — FINASTERIDE 5 MG: 5 TABLET, FILM COATED ORAL at 09:27

## 2022-08-16 RX ADMIN — BUDESONIDE 0.5 MG: 0.5 INHALANT ORAL at 19:32

## 2022-08-16 RX ADMIN — GUAIFENESIN 1200 MG: 600 TABLET ORAL at 09:27

## 2022-08-16 RX ADMIN — GUAIFENESIN 1200 MG: 600 TABLET ORAL at 20:40

## 2022-08-16 RX ADMIN — METHYLPREDNISOLONE SODIUM SUCCINATE 40 MG: 40 INJECTION, POWDER, FOR SOLUTION INTRAMUSCULAR; INTRAVENOUS at 05:28

## 2022-08-16 RX ADMIN — CHOLESTYRAMINE 4 G: 4 POWDER, FOR SUSPENSION ORAL at 09:28

## 2022-08-16 RX ADMIN — AMLODIPINE BESYLATE 10 MG: 10 TABLET ORAL at 09:28

## 2022-08-16 RX ADMIN — BUSPIRONE HYDROCHLORIDE 10 MG: 10 TABLET ORAL at 09:27

## 2022-08-16 RX ADMIN — ENOXAPARIN SODIUM 40 MG: 40 INJECTION SUBCUTANEOUS at 09:28

## 2022-08-16 RX ADMIN — IPRATROPIUM BROMIDE 0.5 MG: 0.5 SOLUTION RESPIRATORY (INHALATION) at 13:54

## 2022-08-16 RX ADMIN — CEFTRIAXONE 1000 MG: 1 INJECTION, SOLUTION INTRAVENOUS at 05:36

## 2022-08-16 NOTE — ASSESSMENT & PLAN NOTE
· Chronically on 3 L oxygen secondary to centrilobular emphysema and alpha 1 antitrypsin deficiency  · Required BiPAP on admission in the settings of COPD exacerbation    · CTA PE study:  No acute pulmonary embolus, chronic pulmonary embolus left main pulmonary artery unchanged, emphysema  · Monitor oxygen requirements and keep SpO2 over 88%  · Treat for COPD exacerbation  · Pulmonology consulted

## 2022-08-16 NOTE — CASE MANAGEMENT
Case Management Progress Note    Patient name Jonathan Paredes  Location W /W -73 MRN [id number]  : 1945 Date 2022       LOS (days): 1  Geometric Mean LOS (GMLOS) (days): 3 60  Days to GMLOS:2 6        OBJECTIVE:        Current admission status: Inpatient  Preferred Pharmacy:   [id number],  [address]  [address] [facility] [address]  Phone: [phone] Fax: [phone]    [address] [facility] [address]  [address] [place] [address]  Phone: [phone] Fax: [phone]    [address], [address] [facility]  [address] [facility]  [address] [facility] [address]  Phone: [phone] Fax: [phone]    Primary Care Provider: Magnus Do MD    Primary Insurance: MEDICARE  Secondary Insurance: [facility]    PROGRESS NOTE:    PT/OT report recommendation for rehab at discharge  Reports patient only able to walk about 4' and required assistance with mobility  Met with patient at bedside to discuss above  Patient understanding of recommendation and in agreement for blanket referrals to be sent  States that he's been to [facility] most recently and then [address] in May  Has been in/out of both facilities and hospital since May  Reports agreement to return to either location, but also requesting to review available providers once bed offers received  Pulm resident requesting price check for medication - explained that patient likely to go to [facility] at SNF, and medication will be covered through facility  Facility can then follow-up on medication once discharged from there  If discharge plan changes and patient is to go home, medication cost can be reviewed prior to hospital discharge  SNF referral sent in [address] [facility] [address]; awaiting responses

## 2022-08-16 NOTE — PLAN OF CARE
Problem: OCCUPATIONAL THERAPY ADULT  Goal: Performs self-care activities at highest level of function for planned discharge setting  See evaluation for individualized goals  Description: Treatment Interventions: ADL retraining, Functional transfer training, Endurance training, Cognitive reorientation, Patient/family training, Equipment evaluation/education, Compensatory technique education, Energy conservation, Activityengagement          See flowsheet documentation for full assessment, interventions and recommendations  Note: Limitation: Decreased ADL status, Decreased Safe judgement during ADL, Decreased cognition, Decreased endurance, Decreased self-care trans, Decreased high-level ADLs  Prognosis: Good  Assessment: Pt is a 65 y o  male seen for OT evaluation s/p admission to [address] [place] [address] on 8/14/2022 due to SOB  Pt diagnosed with COPD with acute exacerbation ([facility] [address] )  Pt has a significant PMH impacting occupational performance including: alpha-1-antitrypsin deficiency, chronic pulmonary embolism, acute on chronic respiratory failure, depression, Parkinson's disease, HTN urgeny, SIRS, & ambulatory dysfunction  Pt with four recent admissions in the last 6 months  Pt with active OT evaluation and treatment orders and activity orders for Up with assistance  Prior to completing the IE, an extensive review of medical and/or therapy records and physical, cognitive, and psychosocial information related to pt functional performance was completed  The pt is considered a high complexity evaluation due to assessments used during IE and observed and listed functional performance deficits  PTA, pt reports living alone in one story home and being (I) with ADLs & IADLs previously before past three STR placements, & (+)   Pt is motivated to return to home  Personal and environmental factors supporting pt at time of IE include accessible home environment   Personal and environmental factors inhibiting engagement in occupations include advanced age, limited social support, difficulty completing ADLs and difficulty completing IADLs  During evaluation pt performed as is outlined above in flowsheet  Pt required VC for safety, VC for attention to task and education on use of RW to continue engaging in tasks  Standardized assessments used to assist in identifying performance deficits include AMPAC 6-Clicks and Barthel ADL Index  Performance deficits that affect the pts occupational performance during the initial evaluation include impaired balance, functional mobility, endurance, activity tolerance, functional standing tolerance and overall strength, attention to task, safety awareness, insight into deficits and problem solving  Based on pts functional performance and deficits the following occupations will be addressed in OT treatments in order to maximize pts independence and overall occupational performance: grooming, bathing/showering, toileting and toilet hygiene, dressing and functional mobility  Upon discharge from acute care setting, OT recommendation on d/c to Short Term Rehab  Pt goals are listed below       OT Discharge Recommendation: Post acute rehabilitation services

## 2022-08-16 NOTE — ASSESSMENT & PLAN NOTE
· POA:  Tachypnea and tachycardia likely reactive to respiratory distress due to COPD exacerbation  Currently afebrile with no leukocytosis    · Continue azithromycin  · UA, blood cultures, procalcitonin all negative

## 2022-08-16 NOTE — CASE MANAGEMENT
Case Management Assessment & Discharge Planning Note    Patient name Enio Richards  Location W MS 0/W -30 MRN [id number]  : 1945 Date 2022       Current Admission Date: 2022  Current Admission Diagnosis:COPD with acute exacerbation [facility])   Patient Active Problem List    Diagnosis Date Noted    SIRS (systemic inflammatory response syndrome) ([address] ) 2022    Lactic acidosis 2022    Panlobular emphysema ([address] )     H/O cardiac catheterization 2022    Chest heaviness 2022    Hypertensive urgency 2022    Cognitive impairment 2021    Parkinson's disease ([address] ) 2021    Anxiety 2021    Vitamin D deficiency disease 2021    Avascular necrosis of hip, right ([address] ) 2021    Depression, recurrent ([address] ) 2021    Palliative care patient 11/10/2020    Orthostatic hypotension with spine hypertension 2020    COPD with acute exacerbation ([address] ) 2019    Ambulatory dysfunction 2019    Insomnia 2019    Chronic venous insufficiency 2019    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 (HCC) 2019    Centrilobular emphysema ([address] ) 2018    CLAYTON (acute kidney injury) ([address] ) 2018    Acute on chronic respiratory failure ([address] ) 2018    Chronic pulmonary embolism ([address] ) 2018    Former smoker 2018    Liver disease     Alpha-1-antitrypsin deficiency ([address] ) 2017    Gastroesophageal reflux disease 2017    Essential hypertension 2017    BPH (benign prostatic hyperplasia) 2017    Peripheral neuropathy 2017    Allergic rhinitis 2016    Cataracts, both eyes 2015    Chronic diarrhea  2014    Benign colon polyp 2014      LOS (days): 1  Geometric Mean LOS (GMLOS) (days): 3 60  Days to GMLOS:2 7     OBJECTIVE:    Risk of Unplanned Readmission Score: 30 37         Current admission status: Inpatient       Preferred Pharmacy:   [facility], [address] [place] [address] [address] [address]  Phone: [phone] Fax: [phone]    [facility]  [address] [place] [address]  Phone: [phone] Fax: [phone]    [address], [address] [place] [address]  [facility] [address]  Phone: [phone] Fax: [phone]    Primary Care Provider: Gracy Reno MD    Primary Insurance: MEDICARE  Secondary Insurance: [place] MEDINA FERRER    ASSESSMENT:  [address] - Daughter   Primary Phone: [phone] (Mobile)               Advance Directives  Does patient have Advance Directives?: Yes  Advance Directives: Power of  for finance              Patient Information  Admitted from[de-identified] Home  Mental Status: Alert  During Assessment patient was accompanied by: Not accompanied during assessment  Assessment information provided by[de-identified] Patient  Primary Caregiver: Self  Support Systems: Self, Daughter  [place] of Residence: [facility] entry access options   Select all that apply : Stairs  Number of steps to enter home : 1  Type of Current Residence: [place]  In the last 12 months, was there a time when you were not able to pay the mortgage or rent on time?: No  In the last 12 months, how many places have you lived?: 1  In the last 12 months, was there a time when you did not have a steady place to sleep or slept in a shelter (including now)?: No  Homeless/housing insecurity resource given?: N/A  Living Arrangements: Lives Alone    Activities of Daily Living Prior to Admission  Functional Status: Independent  Completes ADLs independently?: Yes  Ambulates independently?: Yes  Does patient use assisted devices?: Yes  Assisted Devices (DME) used: Straight Cane, Home Oxygen concentrator, Portable Oxygen concentrator  DME Company Name (respiratory supplies):  [employer]  O2 Rate(s): 3L  Does patient currently own DME?: Yes  What DME does the patient currently own?: Shower Chair, Bedside Commode, Straight Cane, Walker, Portable Oxygen concentrator, Home Oxygen concentrator  Does patient have a history of Outpatient Therapy (PT/OT)?: Yes  Does the patient have a history of Short-Term Rehab?: Yes ([facility] and [facility])  Does patient have a history of HHC?: Yes  Does patient currently have [facility]?: Yes ([facility])    Current Home Health Care  Type of Current Home Care Services: Home PT, Home OT, Nurse visit  [address][de-identified] [facility] Provider[de-identified] Other (comment) (Pulmonologist)    Patient Information Continued  Does patient have prescription coverage?: Yes  Within the past 12 months, you worried that your food would run out before you got the money to buy more : Never true  Within the past 12 months, the food you bought just didn't last and you didn't have money to get more : Never true  Food insecurity resource given?: N/A  Does patient have a history of substance abuse?: No  Does patient have a history of Mental Health Diagnosis?: No    PHQ 2/9 Screening   Reviewed PHQ 2/9 Depression Screening Score?: No    Means of Transportation  Means of Transport to Appts[de-identified] Drives Self  In the past 12 months, has lack of transportation kept you from medical appointments or from getting medications?: No  In the past 12 months, has lack of transportation kept you from meetings, work, or from getting things needed for daily living?: No  Was application for public transport provided?: N/A (believes he's connected with [facility]; also has had Lyft transports arranged through MDs office)        DISCHARGE DETAILS:    Discharge planning discussed with[de-identified] patient at bedside  [place] of Choice: Yes (re: home care vs SNF)  Comments - Freedom of Choice: states he's current with [facility] for [facility] care, and preference is to return  Patient was recently discharged from [facility] a week prior to hospitalization  Had been at [facility] in May  Reports that [facility] and [facility] [address] are preferred SNFs  Were Treatment Team discharge recommendations reviewed with patient/caregiver?: Yes     Were patient/caregiver advised of the risks associated with not following Treatment Team discharge recommendations?: Yes         [address]         Is the patient interested in [facility] at discharge?: Yes  [address] requested[de-identified] Nursing, Occupational Therapy, Physical [address] Name[de-identified] [address] Provider[de-identified] PCP  Home Health Services Needed[de-identified] Evaluate Functional Status and Safety, Gait/ADL Training, Strengthening/Theraputic Exercises to Improve Function, Oxygen Via Nasal Cannula  Oxygen LPM Ordered (if applicable based on home health services needed):: 3 LPM  Homebound Criteria Met[de-identified] Requires the Assistance of Another Person for Safe Ambulation or to Leave the Home, Uses an Assist Device (i e  cane, walker, etc)  Supporting Clincal Findings[de-identified] Fatigues E[place] in [employer], Limited Endurance, Dyspnea with Exertion, Requires Oxygen    DME Referral Provided  Referral made for DME?: No    Other Referral/Resources/Interventions Provided:  Interventions: HHC, SNF  Referral Comments: Patient admitted due to COPD with acute exacerbation  Patient recently hospitalized at [address] [place] [address] from 7/14-7/15  Had been transferred to [facility] at discharge  Met with patient at bedside to complete assessment  Patient presents as alert/oriented, but very SOB, so conversation limited  States that he has been home from [facility] for approximately 1 week  Reports that he lives alone in a single-story home  Usually ambulates with a cane, but has a walker, shower seat and commode also in the home  Chronically on 3L o2, he believes through [employer]  Portable concentrator at bedside  Patient states he returned to the hospital for increasing SOB; had not been able to stand up from his couch due to SOB/dizziness  Patient states that he has his license, but has not been driving for about 3 months as he's been in and out of the hospital/rehab  Reports that at baseline, he usually does drive  Is connected with [place] [address], but MD appointments are in [place]  Also has had Lyft transports arranged through MD's office  Patient reports he's current with home care services through [facility]- provides in-home infusion  Has history of out-patient PT/OT as well  Patient aware that PT/OT evals are pending  Referral sent to [facility] for possible resumption of care, but will follow for PT/OT recommendations  Patient states that he has three children - two sons (one in [place] [address]  and another in [place]) and one daughter  Daughter, Andrea Davidson, has financial POA  Patient does not have any health care directives/POA  Will continue to follow for d/c planning needs based on PT/OT assessments and patient's respiratory status      Would you like to participate in our [facility] service program?  : No - Declined                                        IMM Given (Date):: 08/15/22

## 2022-08-16 NOTE — PROGRESS NOTES
[facility]  Progress Note - Thai Gracía 1945, 65 y o  male MRN: [id number]  Unit/Bed#: W -56 Encounter: [id number]  Primary Care Provider: Pual Wilson MD   Date and time admitted to hospital: 8/14/2022  6:57 PM    * COPD with acute exacerbation ([facility] )  Assessment & Plan  · IV Solu-Medrol 40 mg every 8 hours  · Atrovent/Xopenex q i d  · Continue performist and Pulmicort  · Respiratory protocol, airway clearance, antitussives and incentive spirometry  · Azithromycin x3days  · Check procalcitonin and sputum cultures  Monitor off antibiotics apart from azithromycin for now  · Pulmonology consult considering recurrent admissions for COPD exacerbation in the last month  · Monitor oxygen requirements    Lactic acidosis  Assessment & Plan  · Likely in the settings of COPD exacerbation and beta agonist use      SIRS (systemic inflammatory response syndrome) (HCC)  Assessment & Plan  · POA:  Tachypnea and tachycardia likely reactive to respiratory distress due to COPD exacerbation  Currently afebrile with no leukocytosis  · Continue azithromycin  · UA, blood cultures, procalcitonin all negative    Hypertensive urgency  Assessment & Plan  · Likely in the setting of respiratory distress  · Continue home regimen of amlodipine and chlorthalidone  · Previously on metoprolol which was discontinued due to his recurrent COPD exacerbations  · Hold midodrine  · P r n  hydralazine for sustained systolic blood pressure over 180    Parkinson's disease ([facility] )  Assessment & Plan  · Continue Sinemet    Depression, recurrent (HCC)  Assessment & Plan  · Continue home regimen Buspar and Remeron    Acute on chronic respiratory failure (HCC)  Assessment & Plan  · Chronically on 3 L oxygen secondary to centrilobular emphysema and alpha 1 antitrypsin deficiency  · Required BiPAP on admission in the settings of COPD exacerbation    · CTA PE study:  No acute pulmonary embolus, chronic pulmonary embolus left main pulmonary artery unchanged, emphysema  · Monitor oxygen requirements and keep SpO2 over 88%  · Treat for COPD exacerbation  · Pulmonology consulted    Chronic pulmonary embolism (HCC)  Assessment & Plan  · Finished 6 months course of Eliquis per oncology back in 2018  · Per oncology note back then, if patient develops another acute pulmonary embolism then will need lifelong anticoagulation  · No need for systemic anticoagulation at this time    Alpha-1-antitrypsin deficiency [facility])  Assessment & Plan  · On Zemaira infusion per pulmonology        VTE Pharmacologic Prophylaxis: VTE Score: 5 High Risk (Score >/= 5) - Pharmacological DVT Prophylaxis Ordered: enoxaparin (Lovenox)  Sequential Compression Devices Ordered  Patient Centered Rounds: I performed bedside rounds with nursing staff today  Discussions with Specialists or Other Care Team Provider:  Pulmonology    Education and Discussions with Family / Patient: Patient declined call to   Current Length of Stay: 1 day(s)  Current Patient Status: Inpatient   Discharge Plan: Anticipate discharge in 24-48 hrs to rehab facility  Code Status: Level 1 - Full Code    Subjective:   Patient is still short of breath  He does feel slightly improved from yesterday  Otherwise no subjective complaints  Objective:     Vitals:   Temp (24hrs), Av 8 °F (36 6 °C), Min:97 4 °F (36 3 °C), Max:98 °F (36 7 °C)    Temp:  [97 4 °F (36 3 °C)-98 °F (36 7 °C)] 97 4 °F (36 3 °C)  HR:  [67-81] 67  Resp:  [18-20] 18  BP: (138-162)/(74-82) 162/82  SpO2:  [94 %-98 %] 97 %  Body mass index is 22 27 kg/m²  Input and Output Summary (last 24 hours): Intake/Output Summary (Last 24 hours) at 2022 1433  Last data filed at 2022 0705  Gross per 24 hour   Intake 236 ml   Output 1200 ml   Net -964 ml       Physical Exam:   Physical Exam  Constitutional:       Appearance: Normal appearance  HENT:      Head: Normocephalic and atraumatic  Cardiovascular:      Rate and Rhythm: Normal rate and regular rhythm  Pulmonary:      Effort: Pulmonary effort is normal       Comments: Decreased breath sounds throughout all lung fields  Abdominal:      General: Bowel sounds are normal       Palpations: Abdomen is soft  Tenderness: There is no abdominal tenderness  Musculoskeletal:      Right lower leg: No edema  Left lower leg: No edema  Skin:     General: Skin is warm and dry  Neurological:      General: No focal deficit present  Mental Status: He is alert and oriented to person, place, and time  Psychiatric:         Mood and Affect: Mood normal          Behavior: Behavior normal           Additional Data:     Labs:  Results from last 7 days   Lab Units 08/15/22  0426   WBC Thousand/uL 7 36   HEMOGLOBIN g/dL 11 7*   HEMATOCRIT % 34 1*   PLATELETS Thousands/uL 124*   NEUTROS PCT % 85*   LYMPHS PCT % 12*   MONOS PCT % 2*   EOS PCT % 0     Results from last 7 days   Lab Units 08/15/22  0426 08/14/22  1913   SODIUM mmol/L 139 138   POTASSIUM mmol/L 3 9 3 6   CHLORIDE mmol/L 101 102   CO2 mmol/L 25 27   BUN mg/dL 20 23   CREATININE mg/dL 0 97 1 10   ANION GAP mmol/L 13 9   CALCIUM mg/dL 8 6 8 8   ALBUMIN g/dL  --  4 1   TOTAL BILIRUBIN mg/dL  --  1 31*   ALK PHOS U/L  --  58   ALT U/L  --  16   AST U/L  --  17   GLUCOSE RANDOM mg/dL 170* 152*                 Results from last 7 days   Lab Units 08/16/22  0635 08/15/22  0833 08/15/22  0622 08/15/22  0426 08/15/22  0340 08/14/22  1928 08/14/22  1913   LACTIC ACID mmol/L  --  5 2* 6 2* 6 1* 7 3*   < >  --    PROCALCITONIN ng/ml 0 09  --   --  0 13  --   --  0 13    < > = values in this interval not displayed  Lines/Drains:  Invasive Devices  Report    Peripheral Intravenous Line  Duration           Peripheral IV 08/14/22 Left Antecubital 1 day    Peripheral IV 08/14/22 Right Antecubital 1 day                Imaging: No pertinent imaging reviewed      Recent Cultures (last 7 days): Results from last 7 days   Lab Units 08/14/22 1928   BLOOD CULTURE  No Growth at 24 hrs  No Growth at 24 hrs         Last 24 Hours Medication List:   Current Facility-Administered Medications   Medication Dose Route Frequency Provider Last Rate    acetaminophen  650 mg Oral Q6H PRN Jayce Neri MD      albuterol  2 puff Inhalation Q4H PRN Jayce Neri MD      aluminum-magnesium hydroxide-simethicone  30 mL Oral Q6H PRN Jayce Neri MD      amLODIPine  10 mg Oral Daily Jayce Neri MD      azithromycin  500 mg Oral Q24H Jayce Neri MD      benzonatate  200 mg Oral TID PRN Jayce Neri MD      budesonide  0 5 mg Nebulization Q12H Beverly Arizmendi MD      busPIRone  10 mg Oral TID Jayce Neri MD      carbidopa-levodopa  1 tablet Oral HS Jayce Neri MD      chlorthalidone  25 mg Oral Daily Jayce Neri MD      cholestyramine sugar free  4 g Oral Daily Jayce Neri MD      enoxaparin  40 mg Subcutaneous Daily Jayce Neri MD      finasteride  5 mg Oral QAM Jayce Neri MD      fluticasone  2 spray Nasal Daily Jayce Neri MD      formoterol  20 mcg Nebulization Q12H Jayce Neri MD      gabapentin  200 mg Oral BID Jayce Neri MD      guaiFENesin  1,200 mg Oral Q12H [facility] Jayce Neri MD      hydrALAZINE  5 mg Intravenous Q6H PRN Jayce Neri MD      iohexol  50 mL Oral Once in imaging Daniel Costello MD      ipratropium  0 5 mg Nebulization TID Beverly Arizmendi MD      levalbuterol  1 25 mg Nebulization TID Beverly Arizmendi MD      methylPREDNISolone sodium succinate  40 mg Intravenous Q12H [facility] Spenser Monroy MD      mirtazapine  30 mg Oral HS Jayce Neri MD      pantoprazole  40 mg Oral Early Morning Daniel Costello MD      polyethylene glycol  17 g Oral Daily PRN Jayce Neri MD      tamsulosin  0 4 mg Oral Daily Daniel Costello MD          Today, Patient Was Seen By: Evette Blake DO    **Please Note: This note may have been constructed using a voice recognition system  **

## 2022-08-16 NOTE — PROGRESS NOTES
Progress Note - Pulmonary   oJhn Bertrand 65 y o  male MRN: [id number]  Unit/Bed#: W -01 Encouter:[id number]    Assessment/Plan:    1  Acute on chronic respiratory failure   Currently at baseline 3L NC   Titrate O2 for SpO2 >88%   Continue IS    2  Severe COPD with acute exacerbation   Consider tapering SoluMedrol to 40mg q12h   Continue Perforomist and Pulmicort BID   Continue Xopenex and Atrovent TID scheduled   Continue Azithromycin, QTc on recheck 436  Will likely need to be discharged on chronic Azithromycin vs Roflumilast    Discontinue Rocephin given negative procalcitonin    3  Alpha-1 Antitrypsin deficiency   Continue weekly Zameira infusions, next dose due on 8/18    4  Chronic PE   No need for AC at this time      Chief Complaint:    Rae    Subjective:    No overnight events, patient endorses episode of Rae when trying to go to the bathroom this morning which resolved with rest  States that overall he feels slight improvement since yesterday  Cough is unchanged from prior  Denies fevers, chills, chest pain, palpitations, changes in urinary or bowel habits, lower extremity edema  Objective:    Vitals: Blood pressure 162/82, pulse 67, temperature (!) 97 4 °F (36 3 °C), resp  rate 18, height 6' 5" (1 956 m), weight 85 2 kg (187 lb 13 3 oz), SpO2 98 %  on 3L NC,Body mass index is 22 27 kg/m²  Intake/Output Summary (Last 24 hours) at 8/16/2022 0814  Last data filed at 8/16/2022 9497  Gross per 24 hour   Intake 236 ml   Output 2475 ml   Net -2239 ml       Invasive Devices  Report    Peripheral Intravenous Line  Duration           Peripheral IV 08/14/22 Left Antecubital 1 day    Peripheral IV 08/14/22 Right Antecubital 1 day                Physical Exam:     Physical Exam  Vitals and nursing note reviewed  Constitutional:       General: He is not in acute distress  Appearance: Normal appearance  He is ill-appearing  He is not toxic-appearing or diaphoretic     HENT:      Head: Normocephalic and atraumatic  Nose: Nose normal  No rhinorrhea  Mouth/Throat:      Mouth: Mucous membranes are moist       Pharynx: Oropharynx is clear  No oropharyngeal exudate or posterior oropharyngeal erythema  Eyes:      General: No scleral icterus  Right eye: No discharge  Left eye: No discharge  Extraocular Movements: Extraocular movements intact  Conjunctiva/sclera: Conjunctivae normal    Cardiovascular:      Rate and Rhythm: Normal rate and regular rhythm  Pulses: Normal pulses  Heart sounds: Normal heart sounds  No murmur heard  Pulmonary:      Effort: Pulmonary effort is normal       Breath sounds: Examination of the right-lower field reveals decreased breath sounds  Examination of the left-lower field reveals decreased breath sounds  Decreased breath sounds and wheezing (more pronounced on the right mid and lower lobes) present  No rhonchi or rales  Abdominal:      General: Abdomen is flat  Bowel sounds are normal       Palpations: Abdomen is soft  Tenderness: There is no abdominal tenderness  There is no guarding or rebound  Musculoskeletal:      Cervical back: Normal range of motion  Right lower leg: No edema  Left lower leg: No edema  Skin:     General: Skin is warm and dry  Coloration: Skin is not jaundiced or pale  Neurological:      Mental Status: He is alert and oriented to person, place, and time  Psychiatric:         Mood and Affect: Mood normal          Behavior: Behavior normal          Thought Content:  Thought content normal          Judgment: Judgment normal          Labs:   Recent Labs     08/14/22  1913 08/15/22  0426   WBC 8 24 7 36   HGB 12 9 11 7*   HCT 37 4 34 1*   MCV 96 96   MCH 33 0 32 9   MCHC 34 5 34 3   * 124*   RBC 3 91 3 56*     Results from last 7 days   Lab Units 08/15/22  0426 08/14/22  1913   SODIUM mmol/L 139 138   POTASSIUM mmol/L 3 9 3 6   CHLORIDE mmol/L 101 102   CO2 mmol/L 25 27 ANION GAP mmol/L 13 9   BUN mg/dL 20 23   CREATININE mg/dL 0 97 1 10   CALCIUM mg/dL 8 6 8 8   ALK PHOS U/L  --  58   ALT U/L  --  16   AST U/L  --  17     Recent Labs     08/14/22  1913 08/15/22  0426 08/16/22  0635   PROCALCITONI 0 13 0 13 0 09       Imaging and other studies: I have personally reviewed pertinent reports     and I have personally reviewed pertinent films in PACS

## 2022-08-16 NOTE — PLAN OF CARE
Problem: PHYSICAL THERAPY ADULT  Goal: Performs mobility at highest level of function for planned discharge setting  See evaluation for individualized goals  Description: Treatment/Interventions: Functional transfer training, LE strengthening/ROM, Elevations, Therapeutic exercise, Endurance training, Patient/family training, Equipment eval/education, Bed mobility, Gait training          See flowsheet documentation for full assessment, interventions and recommendations  8/16/2022 1554 by Leta Paredes PT  Note: Prognosis: Fair  Problem List: Decreased strength, Decreased endurance, Impaired balance, Decreased mobility, Decreased cognition, Impaired judgement, Decreased safety awareness  Assessment: Manny Jeronimo is a 65 y o  Male who presents to [facility] on 8/14/2022 w/ c/o SOB and diagnosis of COPD w/ acute exacerbation  Orders for PT eval and treat received, w/ activity orders of up w/ assist  Comorbidities affecting pt at time of evaluation include: COPD, depression, Parkinson's Disease, chronic pulmonary embolism, HTN  Personal factors affecting DC include: inaccessible home environment, ambulating w/ assistive device, stairs to enter home, inability to ambulate household distances, inability to navigate level surfaces w/o external assistance, decreased cognition, limited home support and inability to perform IADLs  At baseline, pt mobilizes independently w/ RW vs SPC, and w/ 0 fall(s) in the previous 6 months  Upon evaluation, pt presents w/ the following deficits: weakness, impaired balance, decreased endurance and gait deviations  Pt currently requires supervision-min Ax1 for transfers, and min-mod Ax1 w/ RW for ambulation   Pt's clinical presentation is unstable/unpredictable due to need for assist w/ all phases of mobility when usually mobilizing independently, tolerance to only 4 feet of ambulation, need for supplemental oxygen in order to maintain oxygen saturation, need for input for mobility technique/safety and recent drastic decline in mobility compared to baseline  Pt is at an increased risk of falls due to impaired balance, decreased safety awareness, limited ambulatory endurance  From a PT/mobility standpoint, given the above findings, DC recommendation is: Post-acute inpatient rehabilitation  During current admission, pt will benefit from continued skilled inpatient PT in the acute care setting in order to address the above deficits and to maximize function and mobility prior to DC from acute care  Barriers to Discharge: Inaccessible home environment, Decreased caregiver support     PT Discharge Recommendation: Post acute rehabilitation services    See flowsheet documentation for full assessment

## 2022-08-16 NOTE — OCCUPATIONAL THERAPY NOTE
Occupational Therapy Evaluation     Patient Name: Elvira Moeller  MSBZU'T Date: 8/16/2022  Problem List  Principal Problem:    COPD with acute exacerbation ([facility] [address] )  Active Problems:    Alpha-1-antitrypsin deficiency ([facility] [address] )    Chronic pulmonary embolism ([facility] [address] )    Acute on chronic respiratory failure (HCC)    Depression, recurrent ([facility] [address] )    Parkinson's disease ([facility] [address] )    Hypertensive urgency    SIRS (systemic inflammatory response syndrome) ([facility] )    Lactic acidosis    Past Medical History  Past Medical History:   Diagnosis Date    Anesthesia complication     Difficult to wake up    Arthritis     BPH (benign prostatic hyperplasia)     urinary frequency    Cancer (HCC)     basal cell neck, face    CKD (chronic kidney disease) stage 2, GFR 60-89 ml/min 07/04/2022    COPD (chronic obstructive pulmonary disease) ([facility])     COPD exacerbation ([facility] ) 12/29/2019    Coronary artery disease     Elevated PSA 10/25/2018    Full dentures     Hiatal hernia     History of methicillin resistant staphylococcus aureus (MRSA)     10/11/2018 MRSA (nares) positive    Hypertension     Irritable bowel syndrome     Kidney stone     at least 7 episodes    Liver disease     Alpha 1- enzyme deficiency - diagnosed 2002  has been on weekly replacement therapy since then    Parkinson disease [facility])     Pulmonary emphysema ([facility] )     1/25/15  FEV1 - 2 45 liters or 59% of predicted    RSV infection 12/2017    Wears glasses     for driving only     Past Surgical History  Past Surgical History:   Procedure Laterality Date    BACK SURGERY  2008    discectomy    CARDIAC CATHETERIZATION N/A 5/3/2022    Procedure: Cardiac catheterization both left and right heart catheterization with vaso dilatory trial;  Surgeon: Kathy Story MD;  Location: [place] [address] CATH LAB; Service: Cardiology    CARDIAC CATHETERIZATION Left 5/3/2022    Procedure: Cardiac Left Heart Cath;  Surgeon: Kathy Story MD;  Location: [place] [address] CATH LAB;   Service: Cardiology    CARDIAC CATHETERIZATION Left 5/3/2022    Procedure: Cardiac Left Ventriculogram;  Surgeon: Grant Candelaria MD;  Location: [place] [address] CATH LAB; Service: Cardiology    COLONOSCOPY      COLONOSCOPY N/A 3/10/2017    Procedure: Anola Wilder;  Surgeon: Evy Esqueda MD;  Location: [facility] GI LAB; Service:     CYSTOSCOPY      removal of kidney stones    ESOPHAGOGASTRODUODENOSCOPY N/A 3/10/2017    Procedure: ESOPHAGOGASTRODUODENOSCOPY (EGD); Surgeon: Evy Esqueda MD;  Location: [facility] GI LAB; Service:     LITHOTRIPSY      OR ESOPHAGOGASTRODUODENOSCOPY TRANSORAL DIAGNOSTIC N/A 11/20/2018    Procedure: ESOPHAGOGASTRODUODENOSCOPY (EGD); Surgeon: Evy Esqueda MD;  Location: [facility] GI LAB; Service: Gastroenterology    TONSILLECTOMY      VEIN LIGATION AND STRIPPING Bilateral     1980's 08/16/22 1204   OT Last Visit   OT Visit Date 08/16/22   Note Type   Note type Evaluation   Restrictions/Precautions   Weight Bearing Precautions Per Order No   Other Precautions Cognitive; Chair Alarm; Bed Alarm;O2;Fall Risk  (3L O2 via NC)   Pain Assessment   Pain Assessment Tool 0-10   Pain Score No Pain   Home Living   Type of [address] One level;Performs ADLs on one level;Stairs to enter with rails  (2 TEMI vs 1 TEMI through garage)   Bathroom Shower/Tub Walk-in shower   Bathroom Toilet Standard   Bathroom Equipment Grab bars in shower; Shower chair   Bathroom Accessibility Accessible   Home Equipment Cane;Walker  (3L O2 at baseline)   Additional Comments Pt reports use of cane and walker at baseline PTA, depends on s/s of exertion and how unsteady he is feeling whether he uses the cane or walker   Prior Function   Lives With Brandon Diaz From Family  (Daughter will assist with grocery shopping only)   ADL Assistance Independent  (reports not being in shower since before rehab admission)   IADLs Independent  ("cleaning hasn't been done in the last 3 months")   Falls in the last 6 months 0   Vocational Retired Comments recently had multiple admissions at [facility], therefore has not been driving since May   Lifestyle   Autonomy Pt reports living alone in one story home and being (I) with ADLs & IADLs previously before past three STR placements, (+)    Reciprocal Relationships Daughter   Service to Others Retired   Intrinsic Gratification Watching tv   Subjective   Subjective "It took me a full whole day to just go through my mail and checks"   ADL   Eating Assistance 5  Supervision/Setup  (Eating ice cream)   Eating Deficit Setup; Increased time to complete   Grooming Assistance 5  Supervision/Setup   UB Bathing Assistance 5  Supervision/Setup   LB Bathing Assistance 5  Supervision/Setup   UB Dressing Assistance 5  Supervision/Setup   LB Dressing Assistance 5  Supervision/Setup  ([place] and donning socks)   LB Dressing Deficit Setup;Verbal cueing; Increased time to complete;Supervision/safety  (Pt requiring increased rest time between doffing and donning socks due to s/s of exertion and SOB)   Toileting Assistance  5  Supervision/Setup   Additional Comments While unable to assess grooming, UB bathing, LB bathing, UB dressing and toileting at time of evaluation, with use of clinical reasoning, pt's performance throughout evaluation indicates that pt may be able to perform these tasks at the levels listed above, anticipate that pt will require increased help with ADLs when experiencing SOB and s/s of exertion, pt reports that it takes extended period of time with frequent rest breaks to complete ADLs   Bed Mobility   Supine to Sit Unable to assess   Sit to Supine Unable to assess   Additional Comments Pt seated EOB upon arrival and in bedside recliner at end of session   Transfers   Sit to Stand 5  Supervision   Additional items Increased time required;Verbal cues   Stand to Sit 5  Supervision   Additional items Increased time required;Verbal cues   Additional Comments w/ RW for increased stability, pt experiencing increased SOB and s/s of exertion during transfers, VCs for body positioning and hand placement   Functional Mobility   Functional Mobility 3  Moderate assistance   Additional Comments Ax1, pt moving from bed > bedside chair, pt with increased difficulty moving due to s/s of exertion, wheezing, and SOB, pt requiring Mod Ax1 to correct body positioning when moving, VCs for safety and use of RW, requiring increased time for rest breaks to reduce pulmonary s/s   Additional items Rolling walker   Balance   Static Sitting Fair   Dynamic Sitting Fair -   Static Standing Poor  (w/ RW)   Dynamic Standing Poor  (w/ RW)   Ambulatory Poor  (w/ RW)   Activity Tolerance   Activity Tolerance Patient limited by fatigue   Medical Staff Ritzuhairt coordination with PT Soraida   Nurse Made Aware Spoke to EMY Gagnon   RUE Assessment   RUE Assessment WFL   LUE Assessment   LUE Assessment WFL   Hand Function   Gross Motor Coordination Functional   Fine Motor Coordination Functional   Cognition   Overall Cognitive Status Impaired   Arousal/Participation Alert; Cooperative   Attention Attends with cues to redirect   Orientation Level Oriented X4   Memory Decreased recall of precautions   Following Commands Follows one step commands with increased time or repetition   Comments Pt identified by name and  at beginning of session, pt pleasant and cooperative, impulsive during transfers and "whifty" at times, pt easily redirectable, requiring VCs for safety and body positioning   Assessment   Limitation Decreased ADL status; Decreased Safe judgement during ADL;Decreased cognition;Decreased endurance;Decreased self-care trans;Decreased high-level ADLs   Prognosis Good   Assessment Pt is a 65 y o  male seen for OT evaluation s/p admission to [facility] on 2022 due to SOB  Pt diagnosed with COPD with acute exacerbation ([facility] [address] )   Pt has a significant PMH impacting occupational performance including: alpha-1-antitrypsin deficiency, chronic pulmonary embolism, acute on chronic respiratory failure, depression, Parkinson's disease, HTN urgeny, SIRS, & ambulatory dysfunction  Pt with four recent admissions in the last 6 months  Pt with active OT evaluation and treatment orders and activity orders for Up with assistance  Prior to completing the IE, an extensive review of medical and/or therapy records and physical, cognitive, and psychosocial information related to pt functional performance was completed  The pt is considered a high complexity evaluation due to assessments used during IE and observed and listed functional performance deficits  PTA, pt reports living alone in one story home and being (I) with ADLs & IADLs previously before past three STR placements, & (+)   Pt is motivated to return to home  Personal and environmental factors supporting pt at time of IE include accessible home environment  Personal and environmental factors inhibiting engagement in occupations include advanced age, limited social support, difficulty completing ADLs and difficulty completing IADLs  During evaluation pt performed as is outlined above in flowsheet  Pt required VC for safety, VC for attention to task and education on use of RW to continue engaging in tasks  Standardized assessments used to assist in identifying performance deficits include AMPAC 6-Clicks and Barthel ADL Index  Performance deficits that affect the pts occupational performance during the initial evaluation include impaired balance, functional mobility, endurance, activity tolerance, functional standing tolerance and overall strength, attention to task, safety awareness, insight into deficits and problem solving   Based on pts functional performance and deficits the following occupations will be addressed in OT treatments in order to maximize pts independence and overall occupational performance: grooming, bathing/showering, toileting and toilet hygiene, dressing and functional mobility  Upon discharge from acute care setting, OT recommendation on d/c to Short Term Rehab  Pt goals are listed below  Goals   Patient Goals To return back to his home   LTG Time Frame 10-14   Long Term Goal Refer to goals below   Plan   Treatment Interventions ADL retraining;Functional transfer training; Endurance training;Cognitive reorientation;Patient/family training;Equipment evaluation/education; Compensatory technique education; Energy conservation; Activityengagement   Goal Expiration Date 08/26/22   OT Treatment Day 0   OT Frequency 3-5x/wk   Recommendation   OT Discharge Recommendation Post acute rehabilitation services   Additional Comments  Pt severely limited by s/s of exertion and decreased endurance which limits occupational performance   AM-PAC Daily Activity Inpatient   Lower Body Dressing 3   Bathing 3   Toileting 3   Upper Body Dressing 3   Grooming 3   Eating 3   Daily Activity Raw Score 18   Daily Activity Standardized Score (Calc for Raw Score >=11) 38 66   AM-PAC Applied Cognition Inpatient   Following a Speech/Presentation 3   Understanding Ordinary Conversation 3   Taking Medications 3   Remembering Where Things Are Placed or Put Away 2   Remembering List of 4-5 Errands 2   Taking Care of Complicated Tasks 2   Applied Cognition Raw Score 15   Applied Cognition Standardized Score 33 54   Barthel Index   Feeding 5   Bathing 0   Grooming Score 0   Dressing Score 5   Bladder Score 10   Bowels Score 10   Toilet Use Score 5   Transfers (Bed/Chair) Score 5   Mobility (Level Surface) Score 0   Stairs Score 0   Barthel Index Score 40     Goals: Goals are developed to assist pt in reaching goal to returning home     -Patient will be Mod I with toileting tasks with use of AD/AE as needed to decrease caregiver burden     -Patient will be Mod I for UB bathing and dressing with AD/AE as needed to increase (I) with ADLs      -Patient will complete LB dressing and bathing with Mod I and AD/AE as needed to improve active ADL participation    -Patient will participate in ongoing cognitive evaluation to assist with safe d/c planning     -Patient will transfer from bed to chair with Min Ax1 and use of RW to improve activity tolerance     -Patient will be Min Ax1 with functional mobility tasks with use of AD to improve ADL participation     -Patient will demonstrate learned safety precautions/body mechanics to improve safety when engaging in functional mobility     -Patient will independently integrate one pacing strategy into morning ADL's     -Patient will demonstrate PLB techniques during ADL tasks with min VC    -Patient will increase standing tolerance to 3 min with no s/s of exertion to improve endurance for participation in functional activities     -Patient will improve OOB tolerance to 4-5 hours per day to increase endurance  The patient's raw score on the -PAC Daily Activity inpatient short form is 18, standardized score is 38 66, less than 39 4  Patients at this level are likely to benefit from discharge to post-acute rehabilitation services  Please refer to the recommendation of the Occupational Therapist for safe discharge planning  This session, pt required and most appropriately benefited from skilled OT/PT co-eval due to significant regression from functional baseline and decreased activity tolerance  OT and PT goals were addressed separately as seen in documentation       At the end of the session, all needs met and pt seated in bedside chair, chair alarm activated, and call bell within reach    PHANI King

## 2022-08-16 NOTE — PHYSICAL THERAPY NOTE
PHYSICAL THERAPY EVALUATION NOTE          Patient Name: Austen DAVIS Date: 8/16/2022      AGE:   65 y o  Mrn:   [id number]  ADMIT DX:  SOB (shortness of breath) [R06 02]  COPD exacerbation (HCC) [J44 1]    Past Medical History:  Past Medical History:   Diagnosis Date    Anesthesia complication     Difficult to wake up    Arthritis     BPH (benign prostatic hyperplasia)     urinary frequency    Cancer (HCC)     basal cell neck, face    CKD (chronic kidney disease) stage 2, GFR 60-89 ml/min 07/04/2022    COPD (chronic obstructive pulmonary disease) ([facility])     COPD exacerbation ([facility] ) 12/29/2019    Coronary artery disease     Elevated PSA 10/25/2018    Full dentures     Hiatal hernia     History of methicillin resistant staphylococcus aureus (MRSA)     10/11/2018 MRSA (nares) positive    Hypertension     Irritable bowel syndrome     Kidney stone     at least 7 episodes    Liver disease     Alpha 1- enzyme deficiency - diagnosed 2002  has been on weekly replacement therapy since then    Parkinson disease [facility])     Pulmonary emphysema ([facility] )     1/25/15  FEV1 - 2 45 liters or 59% of predicted    RSV infection 12/2017    Wears glasses     for driving only       Past Surgical History:  Past Surgical History:   Procedure Laterality Date    BACK SURGERY  2008    discectomy    CARDIAC CATHETERIZATION N/A 5/3/2022    Procedure: Cardiac catheterization both left and right heart catheterization with vaso dilatory trial;  Surgeon: Estrella Golden MD;  Location: [place] [address] CATH LAB; Service: Cardiology    CARDIAC CATHETERIZATION Left 5/3/2022    Procedure: Cardiac Left Heart Cath;  Surgeon: Estrella Golden MD;  Location: [place] [address] CATH LAB; Service: Cardiology    CARDIAC CATHETERIZATION Left 5/3/2022    Procedure: Cardiac Left Ventriculogram;  Surgeon: Estrella Golden MD;  Location: [place] [address] CATH LAB;   Service: Cardiology    COLONOSCOPY      COLONOSCOPY N/A 3/10/2017 Procedure: Jovanni Hoot;  Surgeon: Pearl Hare MD;  [facility] GI [facility]; Service:     CYSTOSCOPY      removal of kidney stones    ESOPHAGOGASTRODUODENOSCOPY N/A 3/10/2017    Procedure: ESOPHAGOGASTRODUODENOSCOPY (EGD); Surgeon: Pearl Hare MD;  Location: [facility] GI LAB; Service:     LITHOTRIPSY      CA ESOPHAGOGASTRODUODENOSCOPY TRANSORAL DIAGNOSTIC N/A 2018    Procedure: ESOPHAGOGASTRODUODENOSCOPY (EGD); Surgeon: Pearl Hare MD;  Location: [facility] GI LAB; Service: Gastroenterology    TONSILLECTOMY      VEIN LIGATION AND STRIPPING Bilateral     1980's     Length Of Stay: 1        PHYSICAL THERAPY EVALUATION:    Patient's identity confirmed via 2 patient identifiers (full name and ) at start of session       22 1218   PT Last Visit   PT Visit Date 22   Note Type   Note type Evaluation   Pain Assessment   Pain Assessment Tool 0-10   Pain Score No Pain   Restrictions/Precautions   Weight Bearing Precautions Per Order No   Other Precautions Cognitive; Chair Alarm; Bed Alarm;O2;Fall Risk  (3L O2 via NC, pt's baseline)   Home Living   Type of [address] [place] [address] One level;Performs ADLs on one level; Able to live on main level with bedroom/bathroom;Stairs to enter with rails  (1-2 TEMI)   Bathroom Shower/Tub Walk-in shower   Bathroom Toilet Standard   Bathroom Equipment Grab bars in shower; Shower chair   Bathroom Accessibility Accessible   Home Equipment Walker;Cane  (rollator walker, RW)   Additional Comments Pt lives alone in a 1 level house w/ 1-2 TEMI   Prior Function   Lives With (S)  [address] [place] Help From Family  (grocery shopping)   ADL Assistance Independent  (prior to multiple recent hospital/rehab admissions)   IADLs Needs assistance   Falls in the last 6 months 0   Comments At baseline, pt ambulates independently w/ SPC vs walker, performs ADLs independently (has not been able to shower since DC from rehab), and requires assistance w/ ADLs (cleaning service every other week, daughter does grocery shopping, (+) driving - however pt reports he would not drive in his current state)   General   Additional Pertinent History Pt recently DC from STR (approx 1 week PTA), reports at rehab he was ambulating 48' w/ RW prior to DC   Family/Caregiver Present No   Cognition   Overall Cognitive Status Impaired   Arousal/Participation Cooperative   Attention Attends with cues to redirect   Orientation Level Oriented X4   Memory Decreased recall of precautions   Following Commands Follows one step commands with increased time or repetition   Comments Pt ID via name and ; pt agreeable to PT eval and OOB mobility  Pt pleasant throughout, limited safety awareness   Strength RLE   RLE Overall Strength 3+/5  (grossly assessed w/ functional mobility)   Strength LLE   LLE Overall Strength 3+/5  (grossly assessed w/ functional mobility)   Bed Mobility   Supine to Sit Unable to assess   Additional items   (pt sitting at EOB upon arrival)   Sit to Supine Unable to assess   Additional items   (pt OOB in recliner chair at end of session)   Transfers   Sit to Stand 5  Supervision  (close supervision)   Additional items Assist x 1; Increased time required;Verbal cues   Stand to Sit 4  Minimal assistance   Additional items Assist x 1; Armrests; Increased time required;Verbal cues   Additional Comments VC for hand placement   Ambulation/Elevation   Gait pattern Improper Weight shift;Decreased foot clearance; Short stride; Excessively slow; Foward flexed   Gait Assistance 3  Moderate assist  (min-mod Ax1)   Additional items Assist x 1;Verbal cues   Assistive Device Rolling walker   Distance 4'   Ambulation/Elevation Additional Comments Pt ambulated 4' to recliner chair w/ min-mod Ax1 w/ RW  1 LOB requiring mod Ax1 to correct  Pt w/ noted WALKER post transfers and mobility to chair   SpO2 remained >90%   Balance   Static Sitting Fair   Dynamic Sitting Fair -   Static Standing Poor +  (w/ RW)   Dynamic Standing Poor +  (w/ RW)   Ambulatory Poor  (w/ RW)   Endurance Deficit   Endurance Deficit Yes   Endurance Deficit Description pt w/ limited ambulatory endurance, decreased overall tolerance to phyical activity and functional mobility due to WALKER   Activity Tolerance   Activity Tolerance Patient limited by fatigue   Medical Staff Pito coordination w/ OT PHANI Wade due to pt's medical complexity, regression from mobility baseline, and cognitive/safety awareness deficits, individual items assessed and goals addressed; MITUL Lynch   Assessment   Prognosis Fair   Problem List Decreased strength;Decreased endurance; Impaired balance;Decreased mobility; Decreased cognition; Impaired judgement;Decreased safety awareness   Assessment Mario Alberto Lockett is a 65 y o  Male who [facility] w/ c/o SOB and diagnosis of COPD w/ acute exacerbation  Orders for PT eval and treat received, w/ activity orders of up w/ assist  Comorbidities affecting pt at time of evaluation include: COPD, depression, Parkinson's Disease, chronic pulmonary embolism, HTN  Personal factors affecting DC include: inaccessible home environment, ambulating w/ assistive device, stairs to enter home, inability to ambulate household distances, inability to navigate level surfaces w/o external assistance, decreased cognition, limited home support and inability to perform IADLs  At baseline, pt mobilizes independently w/ RW vs SPC, and w/ 0 fall(s) in the previous 6 months  Upon evaluation, pt presents w/ the following deficits: weakness, impaired balance, decreased endurance and gait deviations  Pt currently requires supervision-min Ax1 for transfers, and min-mod Ax1 w/ RW for ambulation   Pt's clinical presentation is unstable/unpredictable due to need for assist w/ all phases of mobility when usually mobilizing independently, tolerance to only 4 feet of ambulation, need for supplemental oxygen in order to maintain oxygen saturation, need for input for mobility technique/safety and recent drastic decline in mobility compared to baseline  Pt is at an increased risk of falls due to impaired balance, decreased safety awareness, limited ambulatory endurance  From a PT/mobility standpoint, given the above findings, DC recommendation is: Post-acute inpatient rehabilitation  During current admission, pt will benefit from continued skilled inpatient PT in the acute care setting in order to address the above deficits and to maximize function and mobility prior to DC from acute care  Barriers to Discharge Inaccessible home environment;Decreased caregiver support   Goals   Patient Goals to be able to walk into the bathroom, to go home   STG Expiration Date 08/26/22   Short Term Goal #1 Pt will: perform bed mobility w/ mod I to decrease pt's burden of care and increase pt's independence w/ repositioning in bed; perform transfers w/ mod I to increase pt's OOB mobility; ambulate at least 76' w/ LRAD and mod I to increase pt's ambulatory endurance/tolerance; negotiate 2 steps w/ UE support and supervision to facilitate pt returning to previous living environment; increase all balance ratings by at least 1 grade to decrease pt's risk of falls   PT Treatment Day 0   Plan   Treatment/Interventions Functional transfer training;LE strengthening/ROM; Elevations; Therapeutic exercise; Endurance training;Patient/family training;Equipment eval/education; Bed mobility;Gait training   PT Frequency 3-5x/wk   Recommendation   PT Discharge Recommendation Post acute rehabilitation services   AM-PAC Basic Mobility Inpatient   Turning in Bed Without Bedrails 3   Lying on Back to Sitting on Edge of Flat Bed 2   Moving Bed to Chair 3   Standing Up From Chair 3   Walk in Room 2   Climb 3-5 Stairs 1   Basic Mobility Inpatient Raw Score 14   Basic Mobility Standardized Score 35 55   Highest Level Of Mobility   -[facility] Goal 4: Move to chair/commode   -HL Achieved 4: Move to chair/commode End of Consult   Patient Position at End of Consult Bedside chair;Bed/Chair alarm activated; All needs within reach       The patient's AM-PAC Basic Mobility Inpatient Short Form Raw Score is 14  A Raw score of less than or equal to 16 suggests the patient may benefit from discharge to post-acute rehabilitation services  Please also refer to the recommendation of the Physical Therapist for safe discharge planning      Pt will benefit from skilled inpatient PT during this admission in order to facilitate progress towards goals and to maximize functional independence prior to [address] rec: post acute rehab        Kandra Dandy, PT, DPT  08/16/22

## 2022-08-16 NOTE — ASSESSMENT & PLAN NOTE
· Finished 6 months course of Eliquis per oncology back in 2018    · Per oncology note back then, if patient develops another acute pulmonary embolism then will need lifelong anticoagulation  · No need for systemic anticoagulation at this time

## 2022-08-17 PROBLEM — R65.10 SIRS (SYSTEMIC INFLAMMATORY RESPONSE SYNDROME) (HCC): Status: RESOLVED | Noted: 2022-08-14 | Resolved: 2022-08-17

## 2022-08-17 LAB
ANION GAP SERPL CALCULATED.3IONS-SCNC: 11 MMOL/L (ref 4–13)
BUN SERPL-MCNC: 23 MG/DL (ref 5–25)
CALCIUM SERPL-MCNC: 9.4 MG/DL (ref 8.4–10.2)
CHLORIDE SERPL-SCNC: 103 MMOL/L (ref 96–108)
CO2 SERPL-SCNC: 26 MMOL/L (ref 21–32)
CREAT SERPL-MCNC: 0.99 MG/DL (ref 0.6–1.3)
ERYTHROCYTE [DISTWIDTH] IN BLOOD BY AUTOMATED COUNT: 15 % (ref 11.6–15.1)
GFR SERPL CREATININE-BSD FRML MDRD: 72 ML/MIN/1.73SQ M
GLUCOSE SERPL-MCNC: 115 MG/DL (ref 65–140)
HCT VFR BLD AUTO: 34 % (ref 36.5–49.3)
HGB BLD-MCNC: 11.9 G/DL (ref 12–17)
MCH RBC QN AUTO: 34 PG (ref 26.8–34.3)
MCHC RBC AUTO-ENTMCNC: 35 G/DL (ref 31.4–37.4)
MCV RBC AUTO: 97 FL (ref 82–98)
PLATELET # BLD AUTO: 157 THOUSANDS/UL (ref 149–390)
PMV BLD AUTO: 9.1 FL (ref 8.9–12.7)
POTASSIUM SERPL-SCNC: 4.1 MMOL/L (ref 3.5–5.3)
RBC # BLD AUTO: 3.5 MILLION/UL (ref 3.88–5.62)
SODIUM SERPL-SCNC: 140 MMOL/L (ref 135–147)
WBC # BLD AUTO: 10.25 THOUSAND/UL (ref 4.31–10.16)

## 2022-08-17 PROCEDURE — 94762 N-INVAS EAR/PLS OXIMTRY CONT: CPT

## 2022-08-17 PROCEDURE — 94640 AIRWAY INHALATION TREATMENT: CPT

## 2022-08-17 PROCEDURE — 80048 BASIC METABOLIC PNL TOTAL CA: CPT | Performed by: INTERNAL MEDICINE

## 2022-08-17 PROCEDURE — 94760 N-INVAS EAR/PLS OXIMETRY 1: CPT

## 2022-08-17 PROCEDURE — 99233 SBSQ HOSP IP/OBS HIGH 50: CPT | Performed by: INTERNAL MEDICINE

## 2022-08-17 PROCEDURE — 94664 DEMO&/EVAL PT USE INHALER: CPT

## 2022-08-17 PROCEDURE — 85027 COMPLETE CBC AUTOMATED: CPT | Performed by: INTERNAL MEDICINE

## 2022-08-17 PROCEDURE — 99232 SBSQ HOSP IP/OBS MODERATE 35: CPT | Performed by: INTERNAL MEDICINE

## 2022-08-17 RX ORDER — PREDNISONE 20 MG/1
40 TABLET ORAL DAILY
Status: DISCONTINUED | OUTPATIENT
Start: 2022-08-18 | End: 2022-08-18 | Stop reason: HOSPADM

## 2022-08-17 RX ORDER — GUAIFENESIN/DEXTROMETHORPHAN 100-10MG/5
10 SYRUP ORAL EVERY 6 HOURS PRN
Status: DISCONTINUED | OUTPATIENT
Start: 2022-08-17 | End: 2022-08-18 | Stop reason: HOSPADM

## 2022-08-17 RX ADMIN — ENOXAPARIN SODIUM 40 MG: 40 INJECTION SUBCUTANEOUS at 09:44

## 2022-08-17 RX ADMIN — METHYLPREDNISOLONE SODIUM SUCCINATE 40 MG: 40 INJECTION, POWDER, FOR SOLUTION INTRAMUSCULAR; INTRAVENOUS at 21:53

## 2022-08-17 RX ADMIN — LEVALBUTEROL HYDROCHLORIDE 1.25 MG: 1.25 SOLUTION, CONCENTRATE RESPIRATORY (INHALATION) at 14:13

## 2022-08-17 RX ADMIN — LEVALBUTEROL HYDROCHLORIDE 1.25 MG: 1.25 SOLUTION, CONCENTRATE RESPIRATORY (INHALATION) at 20:15

## 2022-08-17 RX ADMIN — CARBIDOPA AND LEVODOPA 1 TABLET: 25; 100 TABLET ORAL at 21:54

## 2022-08-17 RX ADMIN — BUDESONIDE 0.5 MG: 0.5 INHALANT ORAL at 20:15

## 2022-08-17 RX ADMIN — TAMSULOSIN HYDROCHLORIDE 0.4 MG: 0.4 CAPSULE ORAL at 09:44

## 2022-08-17 RX ADMIN — FORMOTEROL FUMARATE DIHYDRATE 20 MCG: 20 SOLUTION RESPIRATORY (INHALATION) at 20:15

## 2022-08-17 RX ADMIN — MIRTAZAPINE 30 MG: 15 TABLET, FILM COATED ORAL at 21:54

## 2022-08-17 RX ADMIN — FINASTERIDE 5 MG: 5 TABLET, FILM COATED ORAL at 09:44

## 2022-08-17 RX ADMIN — BUSPIRONE HYDROCHLORIDE 10 MG: 10 TABLET ORAL at 16:45

## 2022-08-17 RX ADMIN — CHOLESTYRAMINE 4 G: 4 POWDER, FOR SUSPENSION ORAL at 09:44

## 2022-08-17 RX ADMIN — LEVALBUTEROL HYDROCHLORIDE 1.25 MG: 1.25 SOLUTION, CONCENTRATE RESPIRATORY (INHALATION) at 08:59

## 2022-08-17 RX ADMIN — CHLORTHALIDONE 25 MG: 25 TABLET ORAL at 09:47

## 2022-08-17 RX ADMIN — BUSPIRONE HYDROCHLORIDE 10 MG: 10 TABLET ORAL at 09:44

## 2022-08-17 RX ADMIN — GABAPENTIN 200 MG: 100 CAPSULE ORAL at 17:20

## 2022-08-17 RX ADMIN — BUSPIRONE HYDROCHLORIDE 10 MG: 10 TABLET ORAL at 21:54

## 2022-08-17 RX ADMIN — GUAIFENESIN 1200 MG: 600 TABLET ORAL at 21:54

## 2022-08-17 RX ADMIN — METHYLPREDNISOLONE SODIUM SUCCINATE 40 MG: 40 INJECTION, POWDER, FOR SOLUTION INTRAMUSCULAR; INTRAVENOUS at 10:08

## 2022-08-17 RX ADMIN — IPRATROPIUM BROMIDE 0.5 MG: 0.5 SOLUTION RESPIRATORY (INHALATION) at 20:15

## 2022-08-17 RX ADMIN — IPRATROPIUM BROMIDE 0.5 MG: 0.5 SOLUTION RESPIRATORY (INHALATION) at 14:13

## 2022-08-17 RX ADMIN — FLUTICASONE PROPIONATE 2 SPRAY: 50 SPRAY, METERED NASAL at 09:48

## 2022-08-17 RX ADMIN — IPRATROPIUM BROMIDE 0.5 MG: 0.5 SOLUTION RESPIRATORY (INHALATION) at 08:59

## 2022-08-17 RX ADMIN — AMLODIPINE BESYLATE 10 MG: 10 TABLET ORAL at 09:44

## 2022-08-17 RX ADMIN — GUAIFENESIN 1200 MG: 600 TABLET ORAL at 09:44

## 2022-08-17 RX ADMIN — GABAPENTIN 200 MG: 100 CAPSULE ORAL at 09:44

## 2022-08-17 RX ADMIN — PANTOPRAZOLE SODIUM 40 MG: 40 TABLET, DELAYED RELEASE ORAL at 05:52

## 2022-08-17 RX ADMIN — FORMOTEROL FUMARATE DIHYDRATE 20 MCG: 20 SOLUTION RESPIRATORY (INHALATION) at 08:59

## 2022-08-17 RX ADMIN — BUDESONIDE 0.5 MG: 0.5 INHALANT ORAL at 08:59

## 2022-08-17 NOTE — CASE MANAGEMENT
Case Management Progress Note    Patient name Sergio Silver  Location W /W -84 MRN [id number]  : 1945 Date 2022       LOS (days): 2  Geometric Mean LOS (GMLOS) (days): 3 60  Days to GMLOS:1 5        OBJECTIVE:        Current admission status: Inpatient  Preferred Pharmacy:   [id number] - [address] [facility], [address] [place] [address] [address]  [address] [facility] [address]  Phone: [phone] Fax: [phone]    [address] [facility], [facility] [address]  [address] [place] [address]  Phone: [phone] Fax: [phone]    [address], [address] [facility]  [address] [facility] [address]  Phone: [phone] Fax: [phone]    Primary Care Provider: [place]ani Reich MD    Primary Insurance: MEDICARE  Secondary Insurance: [facility]    PROGRESS NOTE:    Met with patient at bedside to discuss STR and bed offers - patient accepted bed offer at [address]  Initially [place] requesting booster shot prior to admission  Spoke with patient re: same and he reports that he's fully vaccinated with two boosters - received all vaccines through [facility]  Call made to [facility] to get records; only received record of last vaccine - same uploaded in [facility] for facility to review along with records available in [address] [place] [address] Rd  Anticipate d/c to [place] once medically stable  Will continue to follow for same

## 2022-08-17 NOTE — PROGRESS NOTES
[facility]  Progress Note - Valorie Dad 1945, 65 y o  male MRN: [id number]  Unit/Bed#: W -40 Encounter: [id number]  Primary Care Provider: Santhosh Messer MD   Date and time admitted to hospital: 8/14/2022  6:57 PM    * COPD with acute exacerbation ([facility] )  Assessment & Plan  · IV Solu-Medrol 40 mg every 12 hours  · Atrovent/Xopenex q i d  · Continue performist and Pulmicort  · Respiratory protocol, airway clearance, antitussives and incentive spirometry  · Azithromycin x3 days completed  · Monitor oxygen requirements    Lactic acidosis  Assessment & Plan  · Likely in the settings of COPD exacerbation and beta agonist use      Hypertensive urgency  Assessment & Plan  · Likely in the setting of respiratory distress  · Continue home regimen of amlodipine and chlorthalidone  · Previously on metoprolol which was discontinued due to his recurrent COPD exacerbations  · Hold midodrine  · P r n  hydralazine for sustained systolic blood pressure over 180    Parkinson's disease ([facility] )  Assessment & Plan  · Continue Sinemet    Depression, recurrent (HCC)  Assessment & Plan  · Continue home regimen Buspar and Remeron    Acute on chronic respiratory failure (HCC)  Assessment & Plan  · Chronically on 3 L oxygen secondary to centrilobular emphysema and alpha 1 antitrypsin deficiency  · Required BiPAP on admission in the settings of COPD exacerbation  · CTA PE study:  No acute pulmonary embolus, chronic pulmonary embolus left main pulmonary artery unchanged, emphysema  · Monitor oxygen requirements and keep SpO2 over 88%  · Treat for COPD exacerbation  · Pulmonology consulted    Chronic pulmonary embolism (HCC)  Assessment & Plan  · Finished 6 months course of Eliquis per oncology back in 2018    · Per oncology note back then, if patient develops another acute pulmonary embolism then will need lifelong anticoagulation  · No need for systemic anticoagulation at this time    Alpha-1-antitrypsin deficiency [facility])  Assessment & Plan  · On Zemaira infusion per pulmonology      SIRS (systemic inflammatory response syndrome) (HCC)-resolved as of 2022  Assessment & Plan  · POA:  Tachypnea and tachycardia likely reactive to respiratory distress due to COPD exacerbation  Currently afebrile with no leukocytosis  · Continue azithromycin  · UA, blood cultures, procalcitonin all negative      VTE Pharmacologic Prophylaxis: VTE Score: 5 High Risk (Score >/= 5) - Pharmacological DVT Prophylaxis Ordered: enoxaparin (Lovenox)  Sequential Compression Devices Ordered  Patient Centered Rounds: I performed bedside rounds with nursing staff today  Discussions with Specialists or Other Care Team Provider:  Pulmonology    Education and Discussions with Family / Patient: Patient declined call to   Current Length of Stay: 2 day(s)  Current Patient Status: Inpatient   Discharge Plan: Anticipate discharge in 24-48 hrs to rehab facility  Code Status: Level 1 - Full Code    Subjective:   Feeling a little better but still short of breath  Objective:     Vitals:   Temp (24hrs), Av 7 °F (36 5 °C), Min:97 1 °F (36 2 °C), Max:98 5 °F (36 9 °C)    Temp:  [97 1 °F (36 2 °C)-98 5 °F (36 9 °C)] 98 5 °F (36 9 °C)  HR:  [69-89] 89  Resp:  [14-20] 20  BP: (137-167)/(71-88) 137/72  SpO2:  [93 %-99 %] 97 %  Body mass index is 22 25 kg/m²  Input and Output Summary (last 24 hours): Intake/Output Summary (Last 24 hours) at 2022 1825  Last data filed at 2022 1249  Gross per 24 hour   Intake 660 ml   Output 325 ml   Net 335 ml       Physical Exam:   Physical Exam  Constitutional:       Appearance: Normal appearance  HENT:      Head: Normocephalic and atraumatic  Cardiovascular:      Rate and Rhythm: Normal rate and regular rhythm  Pulmonary:      Effort: Pulmonary effort is normal       Comments: Decreased breath sounds throughout all lung fields    Abdominal: General: Bowel sounds are normal       Palpations: Abdomen is soft  Tenderness: There is no abdominal tenderness  Musculoskeletal:      Right lower leg: No edema  Left lower leg: No edema  Skin:     General: Skin is warm and dry  Neurological:      General: No focal deficit present  Mental Status: He is alert and oriented to person, place, and time  Psychiatric:         Mood and Affect: Mood normal          Behavior: Behavior normal           Additional Data:     Labs:  Results from last 7 days   Lab Units 08/17/22  0506 08/15/22  0426   WBC Thousand/uL 10 25* 7 36   HEMOGLOBIN g/dL 11 9* 11 7*   HEMATOCRIT % 34 0* 34 1*   PLATELETS Thousands/uL 157 124*   NEUTROS PCT %  --  85*   LYMPHS PCT %  --  12*   MONOS PCT %  --  2*   EOS PCT %  --  0     Results from last 7 days   Lab Units 08/17/22  0506 08/15/22  0426 08/14/22  1913   SODIUM mmol/L 140   < > 138   POTASSIUM mmol/L 4 1   < > 3 6   CHLORIDE mmol/L 103   < > 102   CO2 mmol/L 26   < > 27   BUN mg/dL 23   < > 23   CREATININE mg/dL 0 99   < > 1 10   ANION GAP mmol/L 11   < > 9   CALCIUM mg/dL 9 4   < > 8 8   ALBUMIN g/dL  --   --  4 1   TOTAL BILIRUBIN mg/dL  --   --  1 31*   ALK PHOS U/L  --   --  58   ALT U/L  --   --  16   AST U/L  --   --  17   GLUCOSE RANDOM mg/dL 115   < > 152*    < > = values in this interval not displayed  Results from last 7 days   Lab Units 08/16/22  0635 08/15/22  0833 08/15/22  0622 08/15/22  0426 08/15/22  0340 08/14/22  1928 08/14/22  1913   LACTIC ACID mmol/L  --  5 2* 6 2* 6 1* 7 3*   < >  --    PROCALCITONIN ng/ml 0 09  --   --  0 13  --   --  0 13    < > = values in this interval not displayed  Lines/Drains:  Invasive Devices  Report    Peripheral Intravenous Line  Duration           Peripheral IV 08/14/22 Left Antecubital 2 days    Peripheral IV 08/14/22 Right Antecubital 2 days                Imaging: No pertinent imaging reviewed      Recent Cultures (last 7 days):   Results from last 7 days   Lab Units 08/14/22 1928   BLOOD CULTURE  No Growth at 48 hrs  No Growth at 48 hrs  Last 24 Hours Medication List:   Current Facility-Administered Medications   Medication Dose Route Frequency Provider Last Rate    acetaminophen  650 mg Oral Q6H PRN Jayce Neri MD      albuterol  2 puff Inhalation Q4H PRN [facility] MD Daron      aluminum-magnesium hydroxide-simethicone  30 mL Oral Q6H PRN Jayce Neri MD      amLODIPine  10 mg Oral Daily [facility] MD Daron      budesonide  0 5 mg Nebulization Q12H Osvaldo Mejia MD      busPIRone  10 mg Oral TID [facility] den[facility]      carbidopa-levodopa  1 tablet Oral HS [facility] denis      chlorthalidone  25 mg Oral Daily [facility] denis      cholestyramine sugar free  4 g Oral Daily [facility] denis      dextromethorphan-guaiFENesin  10 mL Oral Q6H PRN Liz Sullivan, MD      enoxaparin  40 mg Subcutaneous Daily [facility]      finasteride  5 mg Oral QAM [facility] denis      fluticasone  2 spray Nasal Daily Jayce MD Daron      formoterol  20 mcg Nebulization Q12H [facility] denis      gabapentin  200 mg Oral BID [facility] denis      guaiFENesin  1,200 mg Oral Q12H [facility] Jayce [facility] den[facility]      hydrALAZINE  5 mg Intravenous Q6H PRN [facility] MD Daron      iohexol  50 mL Oral Once in imaging Ellie Titus MD      ipratropium  0 5 mg Nebulization TID Osvaldo Mejia MD      levalbuterol  1 25 mg Nebulization TID Osvaldo Mejia MD      methylPREDNISolone sodium succinate  40 mg Intravenous Q12H [facility] Nelia Figueredo MD      mirtazapine  30 mg Oral HS [facility] denis      pantoprazole  40 mg Oral Early Morning Ellie Titus MD      polyethylene glycol  17 g Oral Daily PRN [facility] MD Daron      tamsulosin  0 4 mg Oral Daily Ellie Titus MD          Today, Patient Was Seen By: Osvaldo Khan DO    **Please Note: This note may have been constructed using a voice recognition system  **

## 2022-08-17 NOTE — ASSESSMENT & PLAN NOTE
· IV Solu-Medrol 40 mg every 12 hours  · Atrovent/Xopenex q i d  · Continue performist and Pulmicort  · Respiratory protocol, airway clearance, antitussives and incentive spirometry    · Azithromycin x3 days completed  · Monitor oxygen requirements

## 2022-08-18 VITALS
BODY MASS INDEX: 22.13 KG/M2 | RESPIRATION RATE: 19 BRPM | HEIGHT: 77 IN | OXYGEN SATURATION: 97 % | DIASTOLIC BLOOD PRESSURE: 91 MMHG | TEMPERATURE: 97.6 F | SYSTOLIC BLOOD PRESSURE: 142 MMHG | HEART RATE: 83 BPM | WEIGHT: 187.39 LBS

## 2022-08-18 LAB
ANION GAP SERPL CALCULATED.3IONS-SCNC: 9 MMOL/L (ref 4–13)
BUN SERPL-MCNC: 28 MG/DL (ref 5–25)
CALCIUM SERPL-MCNC: 9.1 MG/DL (ref 8.4–10.2)
CHLORIDE SERPL-SCNC: 103 MMOL/L (ref 96–108)
CO2 SERPL-SCNC: 27 MMOL/L (ref 21–32)
CREAT SERPL-MCNC: 1.08 MG/DL (ref 0.6–1.3)
GFR SERPL CREATININE-BSD FRML MDRD: 65 ML/MIN/1.73SQ M
GLUCOSE SERPL-MCNC: 135 MG/DL (ref 65–140)
POTASSIUM SERPL-SCNC: 4.5 MMOL/L (ref 3.5–5.3)
SODIUM SERPL-SCNC: 139 MMOL/L (ref 135–147)

## 2022-08-18 PROCEDURE — 94760 N-INVAS EAR/PLS OXIMETRY 1: CPT

## 2022-08-18 PROCEDURE — 97116 GAIT TRAINING THERAPY: CPT

## 2022-08-18 PROCEDURE — 99239 HOSP IP/OBS DSCHRG MGMT >30: CPT | Performed by: INTERNAL MEDICINE

## 2022-08-18 PROCEDURE — 99232 SBSQ HOSP IP/OBS MODERATE 35: CPT | Performed by: INTERNAL MEDICINE

## 2022-08-18 PROCEDURE — 94640 AIRWAY INHALATION TREATMENT: CPT

## 2022-08-18 PROCEDURE — 97530 THERAPEUTIC ACTIVITIES: CPT

## 2022-08-18 PROCEDURE — 97110 THERAPEUTIC EXERCISES: CPT

## 2022-08-18 PROCEDURE — 80048 BASIC METABOLIC PNL TOTAL CA: CPT | Performed by: INTERNAL MEDICINE

## 2022-08-18 RX ORDER — GUAIFENESIN/DEXTROMETHORPHAN 100-10MG/5
10 SYRUP ORAL EVERY 6 HOURS PRN
Qty: 118 ML | Refills: 0 | Status: SHIPPED | OUTPATIENT
Start: 2022-08-18 | End: 2022-08-25

## 2022-08-18 RX ORDER — PREDNISONE 10 MG/1
TABLET ORAL
Qty: 26 TABLET | Refills: 0 | Status: SHIPPED | OUTPATIENT
Start: 2022-08-19 | End: 2022-08-30

## 2022-08-18 RX ORDER — FORMOTEROL FUMARATE 20 UG/2ML
20 SOLUTION RESPIRATORY (INHALATION)
Qty: 120 ML | Refills: 0 | Status: SHIPPED | OUTPATIENT
Start: 2022-08-18 | End: 2022-09-17

## 2022-08-18 RX ORDER — LEVALBUTEROL 1.25 MG/.5ML
1.25 SOLUTION, CONCENTRATE RESPIRATORY (INHALATION)
Qty: 45 ML | Refills: 0 | Status: SHIPPED | OUTPATIENT
Start: 2022-08-18 | End: 2022-09-17

## 2022-08-18 RX ADMIN — PREDNISONE 40 MG: 20 TABLET ORAL at 09:47

## 2022-08-18 RX ADMIN — CHLORTHALIDONE 25 MG: 25 TABLET ORAL at 09:48

## 2022-08-18 RX ADMIN — BUSPIRONE HYDROCHLORIDE 10 MG: 10 TABLET ORAL at 09:47

## 2022-08-18 RX ADMIN — TAMSULOSIN HYDROCHLORIDE 0.4 MG: 0.4 CAPSULE ORAL at 09:46

## 2022-08-18 RX ADMIN — AMLODIPINE BESYLATE 10 MG: 10 TABLET ORAL at 09:47

## 2022-08-18 RX ADMIN — IPRATROPIUM BROMIDE 0.5 MG: 0.5 SOLUTION RESPIRATORY (INHALATION) at 07:15

## 2022-08-18 RX ADMIN — GABAPENTIN 200 MG: 100 CAPSULE ORAL at 09:46

## 2022-08-18 RX ADMIN — LEVALBUTEROL HYDROCHLORIDE 1.25 MG: 1.25 SOLUTION, CONCENTRATE RESPIRATORY (INHALATION) at 07:15

## 2022-08-18 RX ADMIN — FORMOTEROL FUMARATE DIHYDRATE 20 MCG: 20 SOLUTION RESPIRATORY (INHALATION) at 07:33

## 2022-08-18 RX ADMIN — GUAIFENESIN 1200 MG: 600 TABLET ORAL at 09:47

## 2022-08-18 RX ADMIN — PANTOPRAZOLE SODIUM 40 MG: 40 TABLET, DELAYED RELEASE ORAL at 05:00

## 2022-08-18 RX ADMIN — LEVALBUTEROL HYDROCHLORIDE 1.25 MG: 1.25 SOLUTION, CONCENTRATE RESPIRATORY (INHALATION) at 13:54

## 2022-08-18 RX ADMIN — BUDESONIDE 0.5 MG: 0.5 INHALANT ORAL at 07:15

## 2022-08-18 RX ADMIN — ENOXAPARIN SODIUM 40 MG: 40 INJECTION SUBCUTANEOUS at 09:48

## 2022-08-18 RX ADMIN — IPRATROPIUM BROMIDE 0.5 MG: 0.5 SOLUTION RESPIRATORY (INHALATION) at 13:55

## 2022-08-18 RX ADMIN — CHOLESTYRAMINE 4 G: 4 POWDER, FOR SUSPENSION ORAL at 09:46

## 2022-08-18 RX ADMIN — FLUTICASONE PROPIONATE 2 SPRAY: 50 SPRAY, METERED NASAL at 09:47

## 2022-08-18 RX ADMIN — FINASTERIDE 5 MG: 5 TABLET, FILM COATED ORAL at 09:46

## 2022-08-18 NOTE — CASE MANAGEMENT
Case Management Discharge Planning Note    Patient name José Luis Cardozo  Location W MS 0/W -71 MRN [id number]  : 1945 Date 2022       Current Admission Date: 2022  Current Admission Diagnosis:COPD with acute exacerbation [facility])   Patient Active Problem List    Diagnosis Date Noted    Lactic acidosis 2022    Panlobular emphysema ([address] )     H/O cardiac catheterization 2022    Chest heaviness 2022    Hypertensive urgency 2022    Cognitive impairment 2021    Parkinson's disease ([address] ) 2021    Anxiety 2021    Vitamin D deficiency disease 2021    Avascular necrosis of hip, right ([address] ) 2021    Depression, recurrent ([address] ) 2021    Palliative care patient 11/10/2020    Orthostatic hypotension with spine hypertension 2020    COPD with acute exacerbation ([address] ) 2019    Ambulatory dysfunction 2019    Insomnia 2019    Chronic venous insufficiency 2019    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 ([address] ) 2019    Centrilobular emphysema ([address] ) 2018    CLAYTON (acute kidney injury) ([address] ) 2018    Acute on chronic respiratory failure ([address] ) 2018    Chronic pulmonary embolism ([address] ) 2018    Former smoker 2018    Liver disease     Alpha-1-antitrypsin deficiency ([address] ) 2017    Gastroesophageal reflux disease 2017    Essential hypertension 2017    BPH (benign prostatic hyperplasia) 2017    Peripheral neuropathy 2017    Allergic rhinitis 2016    Cataracts, both eyes 2015    Chronic diarrhea  2014    Benign colon polyp 2014      LOS (days): 3  Geometric Mean LOS (GMLOS) (days): 3 60  Days to GMLOS:0 5     OBJECTIVE:  Risk of Unplanned Readmission Score: 34 05         Current admission status: Inpatient   Preferred Pharmacy:   [id number] - [address] [place]  [facility] [address]  Phone: [phone] Fax: [phone]    [address] [facility] [address]  [address] [place] [address]  Phone: [phone] Fax: [phone]    [address], [address]  [address] [facility]  [facility] [address]  Phone: [phone] Fax: [phone]    Primary Care Provider: Becky Salguero MD    Primary Insurance: MEDICARE  Secondary Insurance: [employer] [place]    DISCHARGE DETAILS:    Discharge planning discussed with[de-identified] patient at bedside, Kenneth Valenzuela via phone  Freedom of Choice: Yes  Comments - Freedom of Choice: Patient and daugther were agreeable to reccomendation of a SNF and have accepted a bed at [facility]  CM contacted family/caregiver?: Yes (Daughter- Fidelia Urrutia via phone)  Were Treatment Team discharge recommendations reviewed with patient/caregiver?: Yes                         Other Referral/Resources/Interventions Provided:  Interventions: SNF  Referral Comments: Referral sent to [facility]- patient has accepted bed         Treatment Team Recommendation: SNF  Discharge Destination Plan[de-identified] SNF  Transport at Discharge : [facility]     Number/Name of Dispatcher: Round Trip  Transported by [employer] and Unit #): [employer]  ETA of Transport (Date): 08/18/22  ETA of Transport (Time): 6474              IMM Given (Date):: 08/18/22  IMM Given to[de-identified] Patient  Family notified[de-identified] Gwendolyn Urrutia notified of plan for patient to be discharged to [facility] and for confirmed  via [place] [address] [facility] at 3:30 PM  Additional Comments: IMM provided to patient at bedside  Patient accepted and signed   Patient is aware and agreeable of discharge plan to [facility], as well as confirmed transport at 3:30 PM

## 2022-08-18 NOTE — PROGRESS NOTES
Called discharge report to Severino Tucker at [facility] at 36  Name and phone number given to receiving facility

## 2022-08-18 NOTE — PLAN OF CARE
Problem: PHYSICAL THERAPY ADULT  Goal: Performs mobility at highest level of function for planned discharge setting  See evaluation for individualized goals  Description: Treatment/Interventions: Functional transfer training, LE strengthening/ROM, Elevations, Therapeutic exercise, Endurance training, Patient/family training, Equipment eval/education, Bed mobility, Gait training          See flowsheet documentation for full assessment, interventions and recommendations  Outcome: Progressing  Note: Prognosis: Fair  Problem List: Decreased strength, Decreased endurance, Impaired balance, Decreased mobility, Decreased cognition, Impaired judgement, Decreased safety awareness  Assessment: pt began tx session lying supine in the bed and was agreeable to participate in PT intervention  pt was able to complete bed mobility and all functional transfers in [facility] tx session with /s and VC's for hand placement for safety and balance  pt used bed rail to pull up into a seated EOB position and required VC's for hand placement while ascending to RW and descending to recliner and EOB  pt was ray to sit EOB and perform TE activities w/o LOB  pt was able to increase activity tolerance and ambulation distnace as pt ambulated 60'x1 rW with /s as pt had no LOB in [facility] tx session  pt was limited due to fatigue and required a therapeutic seated rest break prior to attempting stair trials  pt was educated on safe stair trial strategies and was able to complete 2 steps in [facility] tx session with /s and VC's for foot placement as pt initially ddi not place complete foot on to step  Continue to recommend post acute rehab services at the time of D/C in order to maximize pt functional independence and safety with all OOB mobility   post tx pt in recliner with call bell and all pt needs met  Barriers to Discharge: Inaccessible home environment, Decreased caregiver support     PT Discharge Recommendation: Post acute rehabilitation services    See flowsheet documentation for full assessment

## 2022-08-18 NOTE — PHYSICAL THERAPY NOTE
PHYSICAL THERAPY NOTE          Patient Name: Homero Rajan  YWMKP'Z Date: 22 0920   PT Last Visit   PT Visit Date 22   Note Type   Note Type Treatment   Pain Assessment   Pain Assessment Tool 0-10   Pain Score No Pain   Patient's Stated Pain Goal No pain   Hospital Pain Intervention(s) Repositioned; Ambulation/increased activity; Emotional support; Rest   Multiple Pain Sites No   Restrictions/Precautions   Weight Bearing Precautions Per Order No   Other Precautions Cognitive; Chair Alarm; Bed Alarm;O2;Fall Risk   General   Chart Reviewed Yes   Additional Pertinent History pre PT intervention pt on 3L NC02  as per RN pt put on 4L NC 02 while active in PT intervention   Response to Previous Treatment Patient with no complaints from previous session  Family/Caregiver Present No   Cognition   Overall Cognitive Status Impaired   Arousal/Participation Alert; Responsive; Cooperative   Attention Attends with cues to redirect   Orientation Level Oriented X4   Memory Decreased recall of precautions   Following Commands Follows one step commands with increased time or repetition   Comments pt identified by name and    Subjective   Subjective pt was agreeable to participate in Pt intervention   Bed Mobility   Supine to Sit 5  Supervision   Additional items Assist x 1;HOB elevated; Bedrails; Increased time required;Verbal cues   Sit to Supine Unable to assess   Additional Comments post tx session pt in recliner with call bell and all pt needs met   Transfers   Sit to Stand 5  Supervision   Additional items Assist x 1; Increased time required;Verbal cues  (w/ RW)   Stand to Sit 5  Supervision   Additional items Assist x 1; Increased time required;Verbal cues;Armrests  (w/ RW)   Stand pivot 5  Supervision   Additional items Assist x 1; Armrests; Increased time required;Verbal cues  (w/ RW)   Additional Comments VC's for hand placement while ascending to rW and descending into recliner for safety and balance   Ambulation/Elevation   Gait pattern Improper Weight shift;Decreased foot clearance; Short stride; Foward flexed; Excessively slow   Gait Assistance 5  Supervision   Additional items Assist x 1;Verbal cues   Assistive Device Rolling walker   Distance 60'x1 RW   Stair Management Assistance 4  Minimal assist   Additional items Assist x 1;Verbal cues; Tactile cues; Increased time required   Stair Management Technique Two rails; Step to pattern; Foreward;Backward   Number of Stairs 2   Balance   Static Sitting Fair   Dynamic Sitting Fair -   Static Standing Poor +   Dynamic Standing Poor +   Ambulatory Fair -  (w/ RW)   Endurance Deficit   Endurance Deficit Yes   Endurance Deficit Description pt cotninues to be limited with ambulation distance due to fatigue as pt required 2 therapeutic restr bfeaks throughout tx session   Activity Tolerance   Activity Tolerance Patient limited by fatigue   Nurse Made Aware Spoke to RN   Exercises   Hip Abduction Sitting;10 reps;AROM; Bilateral   Hip Adduction Sitting;10 reps;AROM; Bilateral  (pillow squeezes)   Knee AROM Long Arc Quad Sitting;15 reps;AROM; Bilateral   Ankle Pumps Sitting;20 reps;AROM; Bilateral   Marching Sitting;10 reps;AROM; Bilateral   Assessment   Prognosis Fair   Problem List Decreased strength;Decreased endurance; Impaired balance;Decreased mobility; Decreased cognition; Impaired judgement;Decreased safety awareness   Assessment pt began tx session lying supine in the bed and was agreeable to participate in PT intervention  pt was able to complete bed mobility and all functional transfers in todays tx session with /s and VC's for hand placement for safety and balance  pt used bed rail to pull up into a seated EOB position and required VC's for hand placement while ascending to RW and descending to recliner and EOB  pt was ray to sit EOB and perform TE activities w/o LOB   pt was able to increase activity tolerance and ambulation distnace as pt ambulated 60'x1 rW with /s as pt had no LOB in [facility] tx session  pt was limited due to fatigue and required a therapeutic seated rest break prior to attempting stair trials  pt was educated on safe stair trial strategies and was able to complete 2 steps in [facility] tx session with /s and VC's for foot placement as pt initially ddi not place complete foot on to step  Continue to recommend post acute rehab services at the time of D/C in order to maximize pt functional independence and safety with all OOB mobility  post tx pt in recliner with call bell and all pt needs met   Goals   Patient Goals no goals stated this tx session   STG Expiration Date 08/26/22   PT Treatment Day 1   Plan   Treatment/Interventions Functional transfer training;LE strengthening/ROM; Elevations; Therapeutic exercise; Endurance training;Patient/family training;Equipment eval/education; Bed mobility;Gait training   Progress Progressing toward goals   PT Frequency 3-5x/wk   Recommendation   PT Discharge Recommendation Post acute rehabilitation services   AM-PAC Basic Mobility Inpatient   Turning in Bed Without Bedrails 3   Lying on Back to Sitting on Edge of Flat Bed 3   Moving Bed to Chair 3   Standing Up From Chair 3   Walk in Room 3   Climb 3-5 Stairs 3   Basic Mobility Inpatient Raw Score 18   Basic Mobility Standardized Score 41 05   Highest Level Of Mobility   JH-HLM Goal 6: Walk 10 steps or more   JH-HLM Achieved 7: Walk 25 feet or more   Education   Education Provided Mobility training;Assistive device; Other  (stair trials)   Patient Reinforcement needed   End of Consult   Patient Position at End of Consult Bedside chair;Bed/Chair alarm activated; All needs within reach   The patient's AM-PAC Basic Mobility Inpatient Short Form Raw Score is 18  A Raw score of greater than 16 suggests the patient may benefit from discharge to home   Please also refer to the recommendation of the Physical Therapist for safe discharge planning  Pt would benefit from continued skilled PT intervention as pt continues to be limited with functional mobility, activity tolerance and ambulation distance  Pt was also limited in stair trials due to fatigue         Elvin Tatum PTA

## 2022-08-18 NOTE — PROGRESS NOTES
Progress Note - Pulmonary   Steven Pat 65 y o  male MRN: [id number]  Unit/Bed#: W -01 Encouter:[id number]    Assessment/Plan:    1  Acute on chronic respiratory failure   Currently at baseline 3 L NC   Titrate for O2 for SpO2 80-94%   Continue incentive spirometry    2  Severe COPD with acute exacerbation  · Transition patient to Prednisone 40mg daily, taper  until patient back to baseline 10mg daily  · Continue Perforomist and Pulmicort b i d   · Continue Xopenex and Atrovent t i d  Scheduled  · Start Roflumilast 250mg daily x4 weeks then transition to 500mg daily at discharge  · Continue IS, antitussives  · Outpatient pulmonary rehab    3  Alpha 1 antitrypsin deficiency   On Zeimara infusions, next one due today    4  Chronic pulmonary embolism   [facility]      Chief Complaint:        Subjective:     Patient found resting comfortably in a chair, continues endorsing shortness of breath with ambulation  He states that his cough has remained the same and continues to be nonproductive  Denies chest pain, palpitations, lightheadedness or dizziness, abdominal tenderness or distension, changes in urinary or bowel habits, lower extremity edema  Objective:    Vitals: Blood pressure 142/91, pulse 83, temperature 97 6 °F (36 4 °C), resp  rate 19, height 6' 5" (1 956 m), weight 85 kg (187 lb 6 3 oz), SpO2 98 %  on 3L O2 NC,Body mass index is 22 22 kg/m²  Intake/Output Summary (Last 24 hours) at 8/18/2022 0820  Last data filed at 8/18/2022 0500  Gross per 24 hour   Intake 900 ml   Output 950 ml   Net -50 ml       Invasive Devices  Report    Peripheral Intravenous Line  Duration           Peripheral IV 08/18/22 Dorsal (posterior); Right Forearm <1 day                Physical Exam:     Physical Exam  Vitals and nursing note reviewed  Constitutional:       General: He is not in acute distress  Appearance: Normal appearance  He is normal weight  He is not ill-appearing     HENT:      Head: Normocephalic and atraumatic  Nose: Nose normal       Mouth/Throat:      Mouth: Mucous membranes are moist       Pharynx: Oropharynx is clear  Eyes:      General: No scleral icterus  Right eye: No discharge  Left eye: No discharge  Extraocular Movements: Extraocular movements intact  Conjunctiva/sclera: Conjunctivae normal    Cardiovascular:      Rate and Rhythm: Normal rate and regular rhythm  Pulses: Normal pulses  Heart sounds: Normal heart sounds  No murmur heard  Pulmonary:      Effort: Pulmonary effort is normal  No respiratory distress  Breath sounds: Wheezing (Minimal wheezing heard at the lung bases, with significant improvement from prior ) present  No rhonchi or rales  Abdominal:      General: Abdomen is flat  There is no distension  Musculoskeletal:      Cervical back: Normal range of motion and neck supple  Right lower leg: No edema  Left lower leg: No edema  Skin:     General: Skin is warm and dry  Coloration: Skin is not jaundiced or pale  Neurological:      Mental Status: He is alert  Psychiatric:         Mood and Affect: Mood normal          Behavior: Behavior normal          Thought Content: Thought content normal          Judgment: Judgment normal          Labs: I have personally reviewed pertinent lab results  , CBC: No results found for: WBC, HGB, HCT, MCV, PLT, ADJUSTEDWBC, MCH, MCHC, RDW, MPV, NRBC, CMP:   Lab Results   Component Value Date    SODIUM 139 08/18/2022    K 4 5 08/18/2022     08/18/2022    CO2 27 08/18/2022    BUN 28 (H) 08/18/2022    CREATININE 1 08 08/18/2022    CALCIUM 9 1 08/18/2022    EGFR 65 08/18/2022           Imaging and other studies: I have personally reviewed pertinent reports     and I have personally reviewed pertinent films in PACS

## 2022-08-19 ENCOUNTER — TRANSITIONAL CARE MANAGEMENT (OUTPATIENT)
Dept: FAMILY MEDICINE CLINIC | Facility: CLINIC | Age: 77
End: 2022-08-19

## 2022-08-19 ENCOUNTER — PATIENT OUTREACH (OUTPATIENT)
Dept: FAMILY MEDICINE CLINIC | Facility: CLINIC | Age: 77
End: 2022-08-19

## 2022-08-19 ENCOUNTER — NURSING HOME VISIT (OUTPATIENT)
Dept: GERIATRICS | Facility: OTHER | Age: 77
End: 2022-08-19
Payer: MEDICARE

## 2022-08-19 VITALS
SYSTOLIC BLOOD PRESSURE: 134 MMHG | DIASTOLIC BLOOD PRESSURE: 82 MMHG | HEART RATE: 86 BPM | RESPIRATION RATE: 18 BRPM | BODY MASS INDEX: 22.41 KG/M2 | TEMPERATURE: 97.2 F | WEIGHT: 189 LBS | OXYGEN SATURATION: 96 %

## 2022-08-19 DIAGNOSIS — J96.21 ACUTE ON CHRONIC RESPIRATORY FAILURE WITH HYPOXIA AND HYPERCAPNIA (HCC): Primary | ICD-10-CM

## 2022-08-19 DIAGNOSIS — R26.2 AMBULATORY DYSFUNCTION: ICD-10-CM

## 2022-08-19 DIAGNOSIS — E88.01 ALPHA-1-ANTITRYPSIN DEFICIENCY (HCC): ICD-10-CM

## 2022-08-19 DIAGNOSIS — I10 ESSENTIAL HYPERTENSION: ICD-10-CM

## 2022-08-19 DIAGNOSIS — J44.1 COPD WITH ACUTE EXACERBATION (HCC): ICD-10-CM

## 2022-08-19 DIAGNOSIS — R39.16 BENIGN PROSTATIC HYPERPLASIA (BPH) WITH STRAINING ON URINATION: ICD-10-CM

## 2022-08-19 DIAGNOSIS — J96.22 ACUTE ON CHRONIC RESPIRATORY FAILURE WITH HYPOXIA AND HYPERCAPNIA (HCC): Primary | ICD-10-CM

## 2022-08-19 DIAGNOSIS — N40.1 BENIGN PROSTATIC HYPERPLASIA (BPH) WITH STRAINING ON URINATION: ICD-10-CM

## 2022-08-19 DIAGNOSIS — G20 PARKINSON'S DISEASE (HCC): ICD-10-CM

## 2022-08-19 DIAGNOSIS — I27.82 OTHER CHRONIC PULMONARY EMBOLISM WITHOUT ACUTE COR PULMONALE (HCC): ICD-10-CM

## 2022-08-19 PROCEDURE — 99306 1ST NF CARE HIGH MDM 50: CPT | Performed by: FAMILY MEDICINE

## 2022-08-19 NOTE — ASSESSMENT & PLAN NOTE
· Likely in the setting of respiratory distress  · Continue home regimen of amlodipine and chlorthalidone     · Previously on metoprolol which was discontinued due to his recurrent COPD exacerbations

## 2022-08-19 NOTE — ASSESSMENT & PLAN NOTE
· Prednisone taper 40 mg, decrease by 10 mg x 3 days until patient is at baseline 10 mg  · Atrovent/Xopenex q i d  · Continue performist and Pulmicort  · Respiratory protocol, airway clearance, antitussives and incentive spirometry    · Azithromycin x3 days completed  · Monitor oxygen requirements  · Daliresp 250 mcg x 4 weeks followed by 500 mcg daily  · Follow-up with pulmonary rehab and pulmonology as outpatient

## 2022-08-19 NOTE — PROGRESS NOTES
[address]  [address] [facility] 27 [facility], 326 [facility] 31  History and Physical    NAME: Ayaka Tatum  AGE: 65 y o  SEX: male [id number]    DATE OF ENCOUNTER: 8/19/2022    Code status:  CPR    Assessment and Plan     1  Acute on chronic respiratory failure with hypoxia and hypercapnia (HCC)  - chronically on home 3L oxygen 2/2 to centrilobular emphysema and alpha-1-antitrypsin deficiency  - required BiPAP on admission in the setting of COPD exacerbation, now back to 3L NC  - maintain SpO2 > 88%    2  COPD with acute exacerbation ([facility] 75 )  - completed azithromycin in the hospital  - continue prednisone taper 40 mg, decreased by 10 mg for 3 days until patient is at baseline 10 mg  - continue roflumilast 250 mcg x4wks followed by 500 mcg daily  - continue xopenex/atrovent qid  - follow-up with pulmonary and pulm rehab as O/P    3  Essential hypertension  - continue home dose amlodipine 10 mg qd and chlorthalidone   - Metoprolol was d/c due to recurrent COPD exacerbation    4  Other chronic pulmonary embolism without acute cor pulmonale ([facility] 75 )  - Per chart review, patient completed 6-month course of Eliquis per oncology in 2018  If patient develops another acute PE then will need lifelong AC  - No need for systemic anticoagulation at this time    5  Parkinson's disease ([facility] )  - maintained on sinemet    6  Benign prostatic hyperplasia (BPH) with straining on urination  - continue finasteride and tamsulosin    7  Ambulatory dysfunction  Multifactorial, age, Parkinson's disease, peripheral neuropathy, chronic medical conditions  - fall precautions  - OT/PT/RT  - optimize chronic medical conditions    8  Alpha-1-antitrypsin deficiency ([facility] 75 )  - follows pulm, on zemaira infusion       All medications and routine orders were reviewed and updated as needed      Plan discussed with: Patient and staff    Chief Complaint     Seen for admission at [address]    History of Present Illness     65 y o  male with PMHx of HTN, COPD, Parkinson's dz who was admitted to [facility] for COPD exacerbation from 8/14 to 8/18  Patient received nebulizer treatment and IV steroids  He was discharged to [address] [place] [address] for short-term rehabilitation  Today, he states he is doing okay  Denies HA, CP, or abdominal pain  He c/o small meal portion here  HISTORY:  Past Medical History:   Diagnosis Date    Anesthesia complication     Difficult to wake up    Arthritis     BPH (benign prostatic hyperplasia)     urinary frequency    Cancer (HCC)     basal cell neck, face    CKD (chronic kidney disease) stage 2, GFR 60-89 ml/min 07/04/2022    COPD (chronic obstructive pulmonary disease) ([facility])     COPD exacerbation ([facility]) 12/29/2019    Coronary artery disease     Elevated PSA 10/25/2018    Full dentures     Hiatal hernia     History of methicillin resistant staphylococcus aureus (MRSA)     10/11/2018 MRSA (nares) positive    Hypertension     Irritable bowel syndrome     Kidney stone     at least 7 episodes    Liver disease     Alpha 1- enzyme deficiency - diagnosed 2002  has been on weekly replacement therapy since then    Parkinson disease ([facility] )     Pulmonary emphysema ([facility] )     1/25/15  FEV1 - 2 45 liters or 59% of predicted    RSV infection 12/2017    SIRS (systemic inflammatory response syndrome) ([facility] ) 8/14/2022    Wears glasses     for driving only     Family History   Problem Relation Age of Onset    Emphysema Mother         never smoked    Emphysema Father     Cancer Brother         colon    Colon cancer Brother     Ulcerative colitis Family     Liver disease Family      Social History     Socioeconomic History    Marital status:       Spouse name: None    Number of children: None    Years of education: None    Highest education level: None   Occupational History    None   Tobacco Use    Smoking status: Former Smoker     Packs/day: 1 00     Years: 60 00 Pack years: 60 00     Quit date: 2017     Years since quittin 9    Smokeless tobacco: Never Used    Tobacco comment: quit in 2017   Vaping Use    Vaping Use: Never used   Substance and Sexual Activity    Alcohol use: Never     Comment: stopped in     Drug use: Not Currently    Sexual activity: None   Other Topics Concern    None   Social History Narrative    Patient is   He has three adult children; 1 daughter and 2 sons  His daughter Terrie Gupta lives closest [place] miles [place] and is very involved w/ his care  Patient is not Yazdanism  He lives alone  Social Determinants of Health     Financial Resource Strain: Not on file   Food Insecurity: No Food Insecurity    Worried About Running Out of Food in the Last Year: Never true    Vi of Food in the Last Year: Never true   Transportation Needs: No Transportation Needs    Lack of Transportation (Medical): No    Lack of Transportation (Non-Medical): No   Physical Activity: Not on file   Stress: Not on file   Social Connections: Not on file   Intimate Partner Violence: Not on file   Housing Stability: Low Risk     Unable to Pay for Housing in the Last Year: No    Number of Places Lived in the Last Year: 1    Unstable Housing in the Last Year: No       Allergies: Allergies   Allergen Reactions    Chantix [Varenicline]      Caused prostate infection       Review of Systems     Review of Systems   Constitutional: Negative for appetite change, chills and fever  HENT: Positive for congestion  Negative for sore throat and trouble swallowing  Eyes: Negative for pain and visual disturbance  Respiratory: Positive for cough and chest tightness  Negative for shortness of breath and wheezing  Cardiovascular: Negative for chest pain and palpitations  Gastrointestinal: Negative for abdominal pain and vomiting  Genitourinary: Negative for dysuria  Musculoskeletal: Negative for arthralgias and back pain     Skin: Negative for color change and rash  Neurological: Negative for headaches  Hematological: Bruises/bleeds easily  Psychiatric/Behavioral: Negative for agitation and behavioral problems  All other systems reviewed and are negative  Medications and orders     All medications reviewed and updated in Nursing Home EMR  Objective     Vitals:    08/19/22 0812   BP: 134/82   Pulse: 86   Resp: 18   Temp: (!) 97 2 °F (36 2 °C)   SpO2: 96%   Weight: 85 7 kg (189 lb)        Physical Exam  Vitals reviewed  Constitutional:       General: He is not in acute distress  Appearance: He is well-developed  HENT:      Head: Normocephalic  Right Ear: External ear normal       Left Ear: External ear normal       Nose:      Comments: NC 3L     Mouth/Throat:      Mouth: Mucous membranes are moist    Eyes:      General:         Right eye: No discharge  Left eye: No discharge  Conjunctiva/sclera: Conjunctivae normal    Cardiovascular:      Rate and Rhythm: Normal rate and regular rhythm  Pulses: Normal pulses  Heart sounds: Normal heart sounds  No murmur heard  Pulmonary:      Breath sounds: No wheezing, rhonchi or rales  Comments: Increased effort  Decreased breath sounds b/l  Abdominal:      General: Bowel sounds are normal  There is no distension  Palpations: Abdomen is soft  Tenderness: There is no abdominal tenderness  Musculoskeletal:      Cervical back: Neck supple  Right lower leg: No edema  Left lower leg: No edema  Skin:     General: Skin is warm  Neurological:      Mental Status: He is alert and oriented to person, place, and time  Motor: No weakness  Psychiatric:         Behavior: Behavior normal          Pertinent Laboratory/Diagnostic Studies: The following labs/studies were reviewed please see chart or hospital paperwork for details      Results from last 7 days   Lab Units 08/17/22  0506 08/15/22  0426 08/14/22  1913   WBC Thousand/uL 10 25* 7 36 8  24   HEMOGLOBIN g/dL 11 9* 11 7* 12 9   HEMATOCRIT % 34 0* 34 1* 37 4   PLATELETS Thousands/uL 157 124* 145*   NEUTROS PCT %  --  85* 61   MONOS PCT %  --  2* 8     Results from last 7 days   Lab Units 08/18/22  0443 08/17/22  0506 08/15/22  0426 08/14/22  1913   POTASSIUM mmol/L 4 5 4 1 3 9 3 6   CHLORIDE mmol/L 103 103 101 102   CO2 mmol/L 27 26 25 27   BUN mg/dL 28* 23 20 23   CREATININE mg/dL 1 08 0 99 0 97 1 10   CALCIUM mg/dL 9 1 9 4 8 6 8 8   ALK PHOS U/L  --   --   --  58   ALT U/L  --   --   --  16   AST U/L  --   --   --  17             - Counseling Documentation: patient was counseled regarding: instructions for management and patient and family education

## 2022-08-19 NOTE — PROGRESS NOTES
ADT alert received  Chart review done  Pt discharged to [facility] for STR  RN CM will continue to follow

## 2022-08-19 NOTE — DISCHARGE SUMMARY
[facility]  Discharge- Yawkey Ernie 1945, 65 y o  male MRN: [id number]  Unit/Bed#: W -52 Encounter: [id number]  Primary Care Provider: Bhupendra Walsh MD   Date and time admitted to hospital: 8/14/2022  6:57 PM    * COPD with acute exacerbation ([facility] 75 )  Assessment & Plan  · Prednisone taper 40 mg, decrease by 10 mg x 3 days until patient is at baseline 10 mg  · Atrovent/Xopenex q i d  · Continue performist and Pulmicort  · Respiratory protocol, airway clearance, antitussives and incentive spirometry  · Azithromycin x3 days completed  · Monitor oxygen requirements  · Daliresp 250 mcg x 4 weeks followed by 500 mcg daily  · Follow-up with pulmonary rehab and pulmonology as outpatient    Lactic acidosis  Assessment & Plan  · Likely in the settings of COPD exacerbation and beta agonist use      Hypertensive urgency  Assessment & Plan  · Likely in the setting of respiratory distress  · Continue home regimen of amlodipine and chlorthalidone  · Previously on metoprolol which was discontinued due to his recurrent COPD exacerbations      Parkinson's disease ([place] [address] )  Assessment & Plan  · Continue Sinemet    Depression, recurrent ([place] [address] )  Assessment & Plan  · Continue home regimen Buspar and Remeron    Acute on chronic respiratory failure (HCC)  Assessment & Plan  · Chronically on 3 L oxygen secondary to centrilobular emphysema and alpha 1 antitrypsin deficiency  · Required BiPAP on admission in the settings of COPD exacerbation  · CTA PE study:  No acute pulmonary embolus, chronic pulmonary embolus left main pulmonary artery unchanged, emphysema  · Monitor oxygen requirements and keep SpO2 over 88%  · Treat for COPD exacerbation  · Pulmonology consulted    Chronic pulmonary embolism (HCC)  Assessment & Plan  · Finished 6 months course of Eliquis per oncology back in 2018    · Per oncology note back then, if patient develops another acute pulmonary embolism then will need lifelong anticoagulation  · No need for systemic anticoagulation at this time    Alpha-1-antitrypsin deficiency [facility])  Assessment & Plan  · On Zemaira infusion per pulmonology      Medical Problems             Resolved Problems  Date Reviewed: 8/14/2022          Resolved    SIRS (systemic inflammatory response syndrome) ([facility] ) 8/17/2022     Resolved by  Evette lBake DO              Discharging Resident: Evette Blake DO  Discharging Attending: No att  providers found  PCP: Filiberto Villavicencio MD  Admission Date:   Admission Orders (From admission, onward)     Ordered        08/15/22 1420  Inpatient Admission  Once            08/14/22 2207  Place in Observation  Once                      Discharge Date: 08/18/22    Consultations During Hospital Stay:  · Pulmonology    Procedures Performed:   · None    Significant Findings / Test Results:   · Please see hospital course    Incidental Findings:   · None     Test Results Pending at Discharge (will require follow up): · None     Outpatient Tests Requested:  · None    Complications:  None    Reason for Admission:  COPD exacerbation    Hospital Course:   Marely Schmidt is a 65 y o  male patient who originally presented to the hospital on 8/14/2022 due to COPD exacerbation  Pulmonology was consulted  Patient received nebulizer treatments and stay IV steroids  PT OT saw patient and recommended rehab  Patient was agreeable to this and was accepted to [facility]  On discharge he will continue with a prednisone stone taper 40 mg every 3 days until at baseline 10 mg daily  He will continue with Pulmicort/performomist   He can continue on Xopenex, Atrovent  He will be started on daliresp 250 mcg x 4 weeks followed by 500 mcg daily  He will follow up with Pulmonology and pulmonology rehab outpatient  Please see above list of diagnoses and related plan for additional information  Condition at Discharge: stable    Discharge Day Visit / Exam:   Subjective:  Patient feels tired  Still having some shortness of breath  Otherwise feels well  Vitals: Blood Pressure: 142/91 (08/18/22 0801)  Pulse: 83 (08/17/22 2044)  Temperature: 97 6 °F (36 4 °C) (08/18/22 0801)  Temp Source: Oral (08/17/22 2044)  Respirations: 19 (08/17/22 2044)  Height: 6' 5" (195 6 cm) (08/15/22 0745)  Weight - Scale: 85 kg (187 lb 6 3 oz) (08/18/22 0536)  SpO2: 97 % (08/18/22 1356)    Exam:   Physical Exam  Constitutional:       Appearance: Normal appearance  HENT:      Head: Normocephalic and atraumatic  Cardiovascular:      Rate and Rhythm: Normal rate and regular rhythm  Pulmonary:      Effort: Pulmonary effort is normal       Comments: Decreased breath sounds throughout all lung fields  Abdominal:      General: Bowel sounds are normal       Palpations: Abdomen is soft  Tenderness: There is no abdominal tenderness  Musculoskeletal:      Right lower leg: No edema  Left lower leg: No edema  Skin:     General: Skin is warm and dry  Neurological:      General: No focal deficit present  Mental Status: He is alert and oriented to person, place, and time  Psychiatric:         Mood and Affect: Mood normal          Behavior: Behavior normal         Discussion with Family: Patient declined call to   Discharge instructions/Information to patient and family:   See after visit summary for information provided to patient and family  Provisions for Follow-Up Care:  See after visit summary for information related to follow-up care and any pertinent home health orders  Disposition:   Pam Mcmillan77: [place] [facility] at [address]  Planned Readmission:  None    Discharge Medications:  See after visit summary for reconciled discharge medications provided to patient and/or family        **Please Note: This note may have been constructed using a voice recognition system**

## 2022-08-20 LAB
BACTERIA BLD CULT: NORMAL
BACTERIA BLD CULT: NORMAL

## 2022-08-23 ENCOUNTER — NURSING HOME VISIT (OUTPATIENT)
Dept: GERIATRICS | Facility: OTHER | Age: 77
End: 2022-08-23
Payer: MEDICARE

## 2022-08-23 VITALS
TEMPERATURE: 97.2 F | WEIGHT: 190.4 LBS | OXYGEN SATURATION: 94 % | HEART RATE: 67 BPM | BODY MASS INDEX: 22.58 KG/M2 | RESPIRATION RATE: 16 BRPM | DIASTOLIC BLOOD PRESSURE: 45 MMHG | SYSTOLIC BLOOD PRESSURE: 134 MMHG

## 2022-08-23 DIAGNOSIS — I10 ESSENTIAL HYPERTENSION: ICD-10-CM

## 2022-08-23 DIAGNOSIS — R26.2 AMBULATORY DYSFUNCTION: ICD-10-CM

## 2022-08-23 DIAGNOSIS — R39.16 BENIGN PROSTATIC HYPERPLASIA (BPH) WITH STRAINING ON URINATION: ICD-10-CM

## 2022-08-23 DIAGNOSIS — J44.1 COPD WITH ACUTE EXACERBATION (HCC): ICD-10-CM

## 2022-08-23 DIAGNOSIS — J96.21 ACUTE ON CHRONIC RESPIRATORY FAILURE WITH HYPOXIA AND HYPERCAPNIA (HCC): ICD-10-CM

## 2022-08-23 DIAGNOSIS — N40.1 BENIGN PROSTATIC HYPERPLASIA (BPH) WITH STRAINING ON URINATION: ICD-10-CM

## 2022-08-23 DIAGNOSIS — J96.22 ACUTE ON CHRONIC RESPIRATORY FAILURE WITH HYPOXIA AND HYPERCAPNIA (HCC): ICD-10-CM

## 2022-08-23 DIAGNOSIS — E88.01 ALPHA-1-ANTITRYPSIN DEFICIENCY (HCC): Primary | ICD-10-CM

## 2022-08-23 DIAGNOSIS — G20 PARKINSON'S DISEASE (HCC): ICD-10-CM

## 2022-08-23 PROCEDURE — 99309 SBSQ NF CARE MODERATE MDM 30: CPT | Performed by: NURSE PRACTITIONER

## 2022-08-23 NOTE — ASSESSMENT & PLAN NOTE
· Patient complains of weak stream  · Patient states he feels he is emptying his bladder [place]   · Denies any other urinary complaints on exam   · Continue finasteride and tamsulosin

## 2022-08-23 NOTE — ASSESSMENT & PLAN NOTE
· Doing well on home O2 3 L NC on exam   · Continue prednisone taper until patient is at 10 mg daily   · Continue Atrovent/Xopenex q i d  nebulizer   · Continue Perforomist and Pulmicort nebulizer  · Continue Roflumilast 250 mcg daily x 4 weeks followed by 500 mcg daily   · OP f/u with Pulm as scheduled

## 2022-08-23 NOTE — PROGRESS NOTES
[facility]  [address]  [phone]  [place]  Code 31 (STR)      NAME: Carleen Esquivel  AGE: 65 y o  SEX: male CODE STATUS: CPR    DATE OF ENCOUNTER: 8/23/2022    Assessment and Plan     1  Alpha-1-antitrypsin deficiency ([facility] )  Assessment & Plan:  · Follows with Pulmonary as OP  · Continue Zemaira infusion       2  Acute on chronic respiratory failure with hypoxia and hypercapnia (HCC)  Assessment & Plan:  · History of centrilobular emphysema and alpha-1 antitrypsin deficiency  · On chronic 3 L nasal cannula oxygen at home   · Presented with COPD exacerbation required BiPAP on admission   · Patient weaned back to baseline O2 requirements 3 L nasal cannula   · Recommended to maintain SpO2 greater than 88%      3  COPD with acute exacerbation ([facility] )  Assessment & Plan:  · Doing well on home O2 3 L NC on exam   · Continue prednisone taper until patient is at 10 mg daily   · Continue Atrovent/Xopenex q i d  nebulizer   · Continue Perforomist and Pulmicort nebulizer  · Continue Roflumilast 250 mcg daily x 4 weeks followed by 500 mcg daily   · OP f/u with Pulm as scheduled      4  Essential hypertension  Assessment & Plan:  · BP stable at [facility]  · Metoprolol was discontinued due to recurrent COPD exacerbation  · Continue amlodipine 10 mg daily and chlorthalidone 25 mg daily  · Patient has history of orthostatic hypotension due to Parkinson's disease-continue p r n  midodrine for hypotension do not give if SBP greater than 140      5  Parkinson's disease ([facility] )  Assessment & Plan:  · Stable   · Continue Sinemet 25/100 t i d  before meals  · Outpatient follow-up with Neurology as scheduled      6  Benign prostatic hyperplasia (BPH) with straining on urination  Assessment & Plan:  · Patient complains of weak stream  · Patient states he feels he is emptying his bladder Men[place]   · Denies any other urinary complaints on exam   · Continue finasteride and tamsulosin      7   Ambulatory dysfunction  Assessment & Plan:  Multifactorial  Continue PT/OT  Fall Precautions  Ensure adequate nutrition/hydration   Monitor CBC/BMP    following for d/c planning            All medications and routine orders were reviewed and updated as needed  Chief Complaint     STR follow up visit    Past Medical and Surgica History      Past Medical History:   Diagnosis Date    Anesthesia complication     Difficult to wake up    Arthritis     BPH (benign prostatic hyperplasia)     urinary frequency    Cancer (HCC)     basal cell neck, face    CKD (chronic kidney disease) stage 2, GFR 60-89 ml/min 07/04/2022    COPD (chronic obstructive pulmonary disease) (HCC)     COPD exacerbation (HCC) 12/29/2019    Coronary artery disease     Elevated PSA 10/25/2018    Full dentures     Hiatal hernia     History of methicillin resistant staphylococcus aureus (MRSA)     10/11/2018 MRSA (nares) positive    Hypertension     Irritable bowel syndrome     Kidney stone     at least 7 episodes    Liver disease     Alpha 1- enzyme deficiency - diagnosed 2002  has been on weekly replacement therapy since then    Parkinson disease ([facility] )     Pulmonary emphysema ([facility] )     1/25/15  FEV1 - 2 45 liters or 59% of predicted    RSV infection 12/2017    SIRS (systemic inflammatory response syndrome) ([facility] ) 8/14/2022    Wears glasses     for driving only     Past Surgical History:   Procedure Laterality Date    BACK SURGERY  2008    discectomy    CARDIAC CATHETERIZATION N/A 5/3/2022    Procedure: Cardiac catheterization both left and right heart catheterization with vaso dilatory trial;  Surgeon: Jakub Medina MD;  Location: [place] [address] CATH LAB; Service: Cardiology    CARDIAC CATHETERIZATION Left 5/3/2022    Procedure: Cardiac Left Heart Cath;  Surgeon: Jakub Medina MD;  Location: [place] [address] CATH LAB;   Service: Cardiology    CARDIAC CATHETERIZATION Left 5/3/2022    Procedure: Cardiac Left Ventriculogram;  Surgeon: Estrella Golden MD;  Location: [place] [address] CATH LAB; Service: Cardiology    COLONOSCOPY      COLONOSCOPY N/A 3/10/2017    Procedure: [place];  Surgeon: Mitra Cabrales MD;  Location: [facility] GI LAB; Service:    Caitlin Fresh      removal of kidney stones    ESOPHAGOGASTRODUODENOSCOPY N/A 3/10/2017    Procedure: ESOPHAGOGASTRODUODENOSCOPY (EGD); Surgeon: Mitra Cabrales MD;  Location: [facility] GI LAB; Service:     LITHOTRIPSY      [place] ESOPHAGOGASTRODUODENOSCOPY TRANSORAL DIAGNOSTIC N/A 11/20/2018    Procedure: ESOPHAGOGASTRODUODENOSCOPY (EGD); Surgeon: Mitra Cabrales MD;  Location: [facility] GI LAB; Service: Gastroenterology    TONSILLECTOMY      VEIN LIGATION AND STRIPPING Bilateral     1980's     Allergies   Allergen Reactions    Chantix [Varenicline]      Caused prostate infection          History of Present Illness     Javy Mina is a 65year old male admitted to [facility] on 8/18/22 for STR  Past Medical Hx including but not limited to COPD, Alpha-1 antiitrypsin deficiency, GERD, HTN, Parkinson's Disease, Peripheral Neuropathy, BPH, Anxiety/Depression, Ambulatory Dysfunction  seen in collaboration with nursing for medical mgmt and STR follow up  Hospital Course:   Presented to [facility] 8/14/22 with COPD exacerbation  He was treated with Azithromycin, steroids, roflumilast and xopenex/atrovent  He did require BiPap on admission and was weaned off and discharged on home dose of O2 at 3L NC  He was recommend steroid taper and then to continue prednisone 10 mg daily  His Metoprolol was stopped due to recurrent COPD exac  He continued with generalized weakness and deconditioning and was recommend to d/c to STR  Rehab:   Seen and examined at bedside today  Patient is a reliable historian  He is resting in bed, he is AAOx3 on exam  Patient states he feels tired and weak   He states for the past few weeks his breathing worsened and he went to hospital  He states he never made it to his Pulmonary appt because he ended up back in hospital  Patient currently is on 2 5 L NC with sats in high 90s  Patient denies chest pain/abdominal pain, he does have shortness of breath with exertion  He denies any N/V/D/dizziness, headaches, vision changes  Patient denies any bowel or bladder issues  He states he has a good appetite, Per review of SNF records, Patient is eating 3 meals per day, consuming 100 %  Last documented BM was 8/20/22  Patient is actively participating in therapy  No concerns from nursing at this time  The patient's allergies, past medical, surgical, social and family history were reviewed and unchanged  Review of Systems     Review of Systems   Constitutional: Positive for activity change and fatigue  Negative for appetite change and fever  HENT: Negative for ear pain, rhinorrhea and trouble swallowing  Eyes: Negative for pain and visual disturbance  Respiratory: Positive for shortness of breath  Negative for cough and wheezing  Cardiovascular: Negative for chest pain and palpitations  Gastrointestinal: Negative for abdominal distention, abdominal pain, blood in stool, constipation, diarrhea, nausea and vomiting  Genitourinary: Negative for decreased urine volume, difficulty urinating, dysuria, frequency and hematuria  Musculoskeletal: Negative for arthralgias, back pain and gait problem  Skin: Negative for color change and rash  Neurological: Positive for weakness  Negative for dizziness, syncope, light-headedness, numbness and headaches  Psychiatric/Behavioral: Negative for dysphoric mood and sleep disturbance  Objective     Vitals:   Vitals:    08/23/22 1420   BP: (!) 134/45   Pulse: 67   Resp: 16   Temp: (!) 97 2 °F (36 2 °C)   SpO2: 94%         Physical Exam  Vitals and nursing note reviewed  Constitutional:       General: He is not in acute distress  Appearance: Normal appearance  HENT:      Head: Normocephalic and atraumatic  Nose: No congestion or rhinorrhea  Mouth/Throat:      Mouth: Mucous membranes are moist    Eyes:      General: No scleral icterus  Extraocular Movements: Extraocular movements intact  Conjunctiva/sclera: Conjunctivae normal       Pupils: Pupils are equal, round, and reactive to light  Cardiovascular:      Rate and Rhythm: Normal rate and regular rhythm  Pulses: Normal pulses  Heart sounds: Normal heart sounds  No murmur heard  Pulmonary:      Effort: Pulmonary effort is normal       Breath sounds: Normal breath sounds  No wheezing, rhonchi or rales  Comments: Diminished   Abdominal:      General: Bowel sounds are normal  There is no distension  Palpations: Abdomen is soft  Tenderness: There is no abdominal tenderness  Musculoskeletal:         General: No swelling or signs of injury  Lymphadenopathy:      Cervical: No cervical adenopathy  Skin:     General: Skin is warm and dry  Findings: No erythema  Neurological:      Mental Status: He is alert and oriented to person, place, and time  Mental status is at baseline  Sensory: No sensory deficit  Motor: Weakness present  Gait: Gait abnormal    Psychiatric:         Mood and Affect: Mood normal          Behavior: Behavior normal          Pertinent Laboratory/Diagnostic Studies:   Reviewed in facility chart-stable      Current Medications   Medications reviewed and updated see facility [facility] for details        Current Outpatient Medications:     amLODIPine (NORVASC) 10 mg tablet, TAKE ONE TABLET BY MOUTH DAILY , Disp: 30 tablet, Rfl: 6    budesonide (PULMICORT) 0 5 mg/2 mL nebulizer solution, Take 2 mL (0 5 mg total) by nebulization in the morning and 2 mL (0 5 mg total) in the evening , Disp: 360 mL, Rfl: 11    busPIRone (BUSPAR) 10 mg tablet, TAKE 1 TABLET BY MOUTH 3 TIMES DAILY, Disp: 90 tablet, Rfl: 0    carbidopa-levodopa (SINEMET)  mg per tablet, take 1 tablet by mouth prior to each meal, Disp: 90 tablet, Rfl: 0    chlorthalidone 25 mg tablet, Take 1 tablet (25 mg total) by mouth daily, Disp: 90 tablet, Rfl: 0    cholestyramine (QUESTRAN) 4 g packet, Take 1 packet (4 g total) by mouth in the morning , Disp: 60 each, Rfl: 0    dextromethorphan-guaiFENesin (ROBITUSSIN DM)  mg/5 mL syrup, Take 10 mL by mouth every 6 (six) hours as needed for cough for up to 7 days, Disp: 118 mL, Rfl: 0    finasteride (PROSCAR) 5 mg tablet, TAKE ONE TABLET BY MOUTH IN THE MORNING , Disp: 90 tablet, Rfl: 3    fluticasone (FLONASE) 50 mcg/act nasal spray, 2 sprays into each nostril daily, Disp: 16 g, Rfl: 5    formoterol (PERFOROMIST) 20 MCG/2ML nebulizer solution, Take 2 mL (20 mcg total) by nebulization every 12 (twelve) hours, Disp: 120 mL, Rfl: 0    gabapentin (NEURONTIN) 100 mg capsule, Take 2 capsules (200 mg total) by mouth 2 (two) times a day, Disp: 120 capsule, Rfl: 0    ipratropium (ATROVENT) 0 02 % nebulizer solution, Take 2 5 mL (0 5 mg total) by nebulization 3 (three) times a day, Disp: 225 mL, Rfl: 0    levalbuterol (XOPENEX) 1 25 mg/0 5 mL nebulizer solution, Take 0 5 mL (1 25 mg total) by nebulization 3 (three) times a day, Disp: 45 mL, Rfl: 0    midodrine (PROAMATINE) 10 MG tablet, Take 1 tab three times daily AS NEEDED if SBP less than 100  Please hold medication if blood pressure is above 786 systolic , Disp: 90 tablet, Rfl: 4    mirtazapine (REMERON) 15 mg tablet, Take 2 tablets (30 mg total) by mouth daily at bedtime, Disp: 30 tablet, Rfl: 5    pantoprazole (PROTONIX) 40 mg tablet, TAKE ONE TABLET BY MOUTH TWICE DAILY, Disp: 180 tablet, Rfl: 0    predniSONE 10 mg tablet, Take 4 tablets (40 mg total) by mouth daily for 2 days, THEN 3 tablets (30 mg total) daily for 3 days, THEN 2 tablets (20 mg total) daily for 3 days, THEN 1 tablet (10 mg total) daily for 3 days  , Disp: 26 tablet, Rfl: 0    roflumilast (DALIRESP) 250 MCG tablet, Take 1 tablet (250 mcg total) by mouth daily for 28 days, Disp: 28 tablet, Rfl: 0    tamsulosin (FLOMAX) 0 4 mg, TAKE ONE CAPSULE BY MOUTH ONE TIME DAILY, Disp: 90 capsule, Rfl: 1    Ventolin  (90 Base) MCG/ACT inhaler, Inhale 2 puffs every 4 (four) hours as needed for wheezing, Disp: 18 g, Rfl: 5     Plan discussed with Dr Preston Alatorre noted agreement with assessment and plan  Please note:  Voice-recognition software may have been used in the preparation of this document  Occasional wrong word or "sound-alike" substitutions may have occurred due to the inherent limitations of voice recognition software  Interpretation should be guided by context           TOPHER Gillette  8/23/2022  2:21 PM

## 2022-08-23 NOTE — ASSESSMENT & PLAN NOTE
· Stable   · Continue Sinemet 25/100 t i d  before meals  · Outpatient follow-up with Neurology as scheduled

## 2022-08-23 NOTE — ASSESSMENT & PLAN NOTE
· History of centrilobular emphysema and alpha-1 antitrypsin deficiency  · On chronic 3 L nasal cannula oxygen at home   · Presented with COPD exacerbation required BiPAP on admission   · Patient weaned back to baseline O2 requirements 3 L nasal cannula   · Recommended to maintain SpO2 greater than 88%

## 2022-08-23 NOTE — ASSESSMENT & PLAN NOTE
· BP stable at [facility]  · Metoprolol was discontinued due to recurrent COPD exacerbation  · Continue amlodipine 10 mg daily and chlorthalidone 25 mg daily  · Patient has history of orthostatic hypotension due to Parkinson's disease-continue p r n  midodrine for hypotension do not give if SBP greater than 140

## 2022-08-30 ENCOUNTER — NURSING HOME VISIT (OUTPATIENT)
Dept: GERIATRICS | Facility: OTHER | Age: 77
End: 2022-08-30
Payer: MEDICARE

## 2022-08-30 VITALS
SYSTOLIC BLOOD PRESSURE: 114 MMHG | DIASTOLIC BLOOD PRESSURE: 66 MMHG | RESPIRATION RATE: 20 BRPM | BODY MASS INDEX: 22.57 KG/M2 | TEMPERATURE: 97.5 F | HEART RATE: 69 BPM | WEIGHT: 190.3 LBS | OXYGEN SATURATION: 98 %

## 2022-08-30 DIAGNOSIS — R26.2 AMBULATORY DYSFUNCTION: ICD-10-CM

## 2022-08-30 DIAGNOSIS — I10 ESSENTIAL HYPERTENSION: ICD-10-CM

## 2022-08-30 DIAGNOSIS — R39.16 BENIGN PROSTATIC HYPERPLASIA (BPH) WITH STRAINING ON URINATION: ICD-10-CM

## 2022-08-30 DIAGNOSIS — J96.21 ACUTE ON CHRONIC RESPIRATORY FAILURE WITH HYPOXIA AND HYPERCAPNIA (HCC): ICD-10-CM

## 2022-08-30 DIAGNOSIS — N40.1 BENIGN PROSTATIC HYPERPLASIA (BPH) WITH STRAINING ON URINATION: ICD-10-CM

## 2022-08-30 DIAGNOSIS — J44.1 COPD WITH ACUTE EXACERBATION (HCC): Primary | ICD-10-CM

## 2022-08-30 DIAGNOSIS — J96.22 ACUTE ON CHRONIC RESPIRATORY FAILURE WITH HYPOXIA AND HYPERCAPNIA (HCC): ICD-10-CM

## 2022-08-30 DIAGNOSIS — G20 PARKINSON'S DISEASE (HCC): ICD-10-CM

## 2022-08-30 DIAGNOSIS — E88.01 ALPHA-1-ANTITRYPSIN DEFICIENCY (HCC): ICD-10-CM

## 2022-08-30 PROCEDURE — 99316 NF DSCHRG MGMT 30 MIN+: CPT | Performed by: NURSE PRACTITIONER

## 2022-08-30 NOTE — PROGRESS NOTES
[facility]  [address]  [phone]  [facility] [facility]: [place]  Code 32    NAME: Francie Sun  AGE: 65 y o  SEX: male   CODE STATUS: CPR    DATE OF ADMISSION: 8/18/2022   DATE OF DISCHARGE: 9/1/22   DISCHARGE DISPOSITION: Stable for discharge to home with family support and home health PT/OT/SN services  Reason for Admission: Patient was admitted to [facility] for rehabilitation after hospitalization for COPD exacerbation  Past Medical and Surgical History:   Past Medical History:   Diagnosis Date    Anesthesia complication     Difficult to wake up    Arthritis     BPH (benign prostatic hyperplasia)     urinary frequency    Cancer (HCC)     basal cell neck, face    CKD (chronic kidney disease) stage 2, GFR 60-89 ml/min 07/04/2022    COPD (chronic obstructive pulmonary disease) (HCC)     COPD exacerbation (HCC) 12/29/2019    Coronary artery disease     Elevated PSA 10/25/2018    Full dentures     Hiatal hernia     History of methicillin resistant staphylococcus aureus (MRSA)     10/11/2018 MRSA (nares) positive    Hypertension     Irritable bowel syndrome     Kidney stone     at least 7 episodes    Liver disease     Alpha 1- enzyme deficiency - diagnosed 2002  has been on weekly replacement therapy since then    Parkinson disease ([address] )     Pulmonary emphysema ([address] )     1/25/15  FEV1 - 2 45 liters or 59% of predicted    RSV infection 12/2017    SIRS (systemic inflammatory response syndrome) ([facility] ) 8/14/2022    Wears glasses     for driving only      Past Surgical History:   Procedure Laterality Date    BACK SURGERY  2008    discectomy    CARDIAC CATHETERIZATION N/A 5/3/2022    Procedure: Cardiac catheterization both left and right heart catheterization with vaso dilatory trial;  Surgeon: Nan Talley MD;  Location: [place] [address] CATH LAB;   Service: Cardiology    CARDIAC CATHETERIZATION Left 5/3/2022    Procedure: Cardiac Left Heart Cath;  Surgeon: Andre Khalil MD;  Location: [place] [address] CATH LAB; Service: Cardiology    CARDIAC CATHETERIZATION Left 5/3/2022    Procedure: Cardiac Left Ventriculogram;  Surgeon: Andre Khalil MD;  Location: [place] [address] CATH LAB; Service: Cardiology    COLONOSCOPY      COLONOSCOPY N/A 3/10/2017    Procedure: Pilar Demetrius;  Surgeon: Jose Silverman MD;  Location: [place] [address] GI LAB; Service:    Emilia Foots      removal of kidney stones    ESOPHAGOGASTRODUODENOSCOPY N/A 3/10/2017    Procedure: ESOPHAGOGASTRODUODENOSCOPY (EGD); Surgeon: Jose Silverman MD;  Location: [facility] GI LAB; Service:     LITHOTRIPSY      MA ESOPHAGOGASTRODUODENOSCOPY TRANSORAL DIAGNOSTIC N/A 11/20/2018    Procedure: ESOPHAGOGASTRODUODENOSCOPY (EGD); Surgeon: Jose Silverman MD;  Location: [facility] GI LAB; Service: Gastroenterology    TONSILLECTOMY      VEIN LIGATION AND STRIPPING Bilateral     1980's       Course of stay:   Maria T Wolff is a 65year old male admitted to [address] [place] [address] on 8/18/22 for STR  Past Medical Hx including but not limited to COPD, Alpha-1 antiitrypsin deficiency, GERD, HTN, Parkinson's Disease, Peripheral Neuropathy, BPH, Anxiety/Depression, Ambulatory Dysfunction  seen in collaboration with nursing for medical mgmt and discharge visit  Hospital Course:   Presented to [facility] 8/14/22 with COPD exacerbation  He was treated with Azithromycin, steroids, roflumilast and xopenex/atrovent  He did require BiPap on admission and was weaned off and discharged on home dose of O2 at 3L NC  He was recommend steroid taper and then to continue prednisone 10 mg daily  His Metoprolol was stopped due to recurrent COPD exac  He continued with generalized weakness and deconditioning and was recommend to d/c to STR  Rehab:   Seen and examined at bedside today  Patient is a reliable historian  He is resting in bed, he is AAOx3 on exam  Patient states he feels weak  He states he has been working with therapy   Patient denies chest pain/abdominal pain, he does have shortness of breath with exertion  He denies any N/V/D/dizziness, headaches, vision changes  Patient denies any bowel or bladder issues  He states he has a good appetite, Per review of SNF records, Patient is eating 3 meals per day, consuming 100 %  Last documented BM was 8/30/22  Patient is actively participating in therapy  No concerns from nursing at this time  ROS:  Review of Systems   Constitutional: Negative for activity change, appetite change, fatigue and fever  HENT: Negative for ear pain, rhinorrhea and trouble swallowing  Eyes: Negative for pain and visual disturbance  Respiratory: Positive for shortness of breath  Negative for cough and wheezing  Cardiovascular: Negative for chest pain and palpitations  Gastrointestinal: Negative for abdominal distention, abdominal pain, blood in stool, constipation, diarrhea, nausea and vomiting  Genitourinary: Negative for decreased urine volume, difficulty urinating, dysuria, frequency and hematuria  Musculoskeletal: Negative for arthralgias, back pain and gait problem  Skin: Negative for color change and rash  Neurological: Positive for weakness  Negative for dizziness, syncope, light-headedness, numbness and headaches  Psychiatric/Behavioral: Negative for dysphoric mood and sleep disturbance  PHYSICAL EXAM:  VITALS:   Vitals:    08/30/22 1032   BP: 114/66   Pulse: 69   Resp: 20   Temp: 97 5 °F (36 4 °C)   SpO2: 98%        Physical Exam  Vitals and nursing note reviewed  Constitutional:       General: He is not in acute distress  Appearance: Normal appearance  HENT:      Head: Normocephalic and atraumatic  Nose: No congestion or rhinorrhea  Mouth/Throat:      Mouth: Mucous membranes are moist    Eyes:      General: No scleral icterus  Extraocular Movements: Extraocular movements intact        Conjunctiva/sclera: Conjunctivae normal       Pupils: Pupils are equal, round, and reactive to light  Cardiovascular:      Rate and Rhythm: Normal rate and regular rhythm  Pulses: Normal pulses  Heart sounds: Normal heart sounds  No murmur heard  Pulmonary:      Effort: Pulmonary effort is normal       Breath sounds: Normal breath sounds  No wheezing, rhonchi or rales  Comments: Diminished   Abdominal:      General: Bowel sounds are normal  There is no distension  Palpations: Abdomen is soft  Tenderness: There is no abdominal tenderness  Musculoskeletal:         General: No swelling or signs of injury  Lymphadenopathy:      Cervical: No cervical adenopathy  Skin:     General: Skin is warm and dry  Findings: No erythema  Neurological:      Mental Status: He is alert and oriented to person, place, and time  Mental status is at baseline  Sensory: No sensory deficit  Motor: Weakness present  Gait: Gait abnormal    Psychiatric:         Mood and Affect: Mood normal          Behavior: Behavior normal          Admission Diagnoses:   1  COPD with acute exacerbation ([facility] )  Assessment & Plan:  · Doing well on home O2 3 L NC on exam   · Continue prednisone taper until patient is at 10 mg daily   · Continue Atrovent/Xopenex q i d  nebulizer   · Continue Perforomist and Pulmicort nebulizer  · Continue Roflumilast 250 mcg daily x 4 weeks followed by 500 mcg daily   · OP f/u with Pulm as scheduled      2  Alpha-1-antitrypsin deficiency ([facility] )  Assessment & Plan:  · Follows with Pulmonary as OP  · Continue Zemaira infusion       3  Acute on chronic respiratory failure with hypoxia and hypercapnia (HCC)  Assessment & Plan:  · History of centrilobular emphysema and alpha-1 antitrypsin deficiency  · On chronic 3 L nasal cannula oxygen at home   · Presented with COPD exacerbation required BiPAP on admission   · Patient weaned back to baseline O2 requirements 3 L nasal cannula   · Recommended to maintain SpO2 greater than 88%      4   Essential hypertension  Assessment & Plan:  · BP stable at [facility]  · Metoprolol was discontinued due to recurrent COPD exacerbation  · Continue amlodipine 10 mg daily and chlorthalidone 25 mg daily  · Patient has history of orthostatic hypotension due to Parkinson's disease-continue p r n  midodrine for hypotension do not give if SBP greater than 140      5  Parkinson's disease ([facility] )  Assessment & Plan:  · Stable   · Continue Sinemet 25/100 t i d  before meals  · Outpatient follow-up with Neurology as scheduled      6  Benign prostatic hyperplasia (BPH) with straining on urination  Assessment & Plan:  · Patient complains of weak stream  · Patient states he feels he is emptying his bladder   · Denies any other urinary complaints on exam   · Continue finasteride and tamsulosin      7  Ambulatory dysfunction  Assessment & Plan:  Multifactorial  Continue PT/OT  Fall Precautions  Ensure adequate nutrition/hydration   Monitor CBC/BMP    following for d/c planning            Follow-up Recommendations:     Outpatient Follow up with PCP in the next 2 weeks  [address] [place] [address] PT/OT/SN services     Labs and testing performed during stay:    Reviewed in chart    Discharge Medications: See discharge medication list which was reviewed and signed        Current Outpatient Medications:     amLODIPine (NORVASC) 10 mg tablet, TAKE ONE TABLET BY MOUTH DAILY , Disp: 30 tablet, Rfl: 6    budesonide (PULMICORT) 0 5 mg/2 mL nebulizer solution, Take 2 mL (0 5 mg total) by nebulization in the morning and 2 mL (0 5 mg total) in the evening , Disp: 360 mL, Rfl: 11    busPIRone (BUSPAR) 10 mg tablet, TAKE 1 TABLET BY MOUTH 3 TIMES DAILY, Disp: 90 tablet, Rfl: 0    carbidopa-levodopa (SINEMET)  mg per tablet, take 1 tablet by mouth prior to each meal, Disp: 90 tablet, Rfl: 0    chlorthalidone 25 mg tablet, Take 1 tablet (25 mg total) by mouth daily, Disp: 90 tablet, Rfl: 0    cholestyramine (QUESTRAN) 4 g packet, Take 1 packet (4 g total) by mouth in the morning , Disp: 60 each, Rfl: 0    finasteride (PROSCAR) 5 mg tablet, TAKE ONE TABLET BY MOUTH IN THE MORNING , Disp: 90 tablet, Rfl: 3    fluticasone (FLONASE) 50 mcg/act nasal spray, 2 sprays into each nostril daily, Disp: 16 g, Rfl: 5    formoterol (PERFOROMIST) 20 MCG/2ML nebulizer solution, Take 2 mL (20 mcg total) by nebulization every 12 (twelve) hours, Disp: 120 mL, Rfl: 0    gabapentin (NEURONTIN) 100 mg capsule, Take 2 capsules (200 mg total) by mouth 2 (two) times a day, Disp: 120 capsule, Rfl: 0    ipratropium (ATROVENT) 0 02 % nebulizer solution, Take 2 5 mL (0 5 mg total) by nebulization 3 (three) times a day, Disp: 225 mL, Rfl: 0    levalbuterol (XOPENEX) 1 25 mg/0 5 mL nebulizer solution, Take 0 5 mL (1 25 mg total) by nebulization 3 (three) times a day, Disp: 45 mL, Rfl: 0    midodrine (PROAMATINE) 10 MG tablet, Take 1 tab three times daily AS NEEDED if SBP less than 100  Please hold medication if blood pressure is above 011 systolic , Disp: 90 tablet, Rfl: 4    mirtazapine (REMERON) 15 mg tablet, Take 2 tablets (30 mg total) by mouth daily at bedtime, Disp: 30 tablet, Rfl: 5    pantoprazole (PROTONIX) 40 mg tablet, TAKE ONE TABLET BY MOUTH TWICE DAILY, Disp: 180 tablet, Rfl: 0    predniSONE 10 mg tablet, Take 4 tablets (40 mg total) by mouth daily for 2 days, THEN 3 tablets (30 mg total) daily for 3 days, THEN 2 tablets (20 mg total) daily for 3 days, THEN 1 tablet (10 mg total) daily for 3 days  , Disp: 26 tablet, Rfl: 0    roflumilast (DALIRESP) 250 MCG tablet, Take 1 tablet (250 mcg total) by mouth daily for 28 days, Disp: 28 tablet, Rfl: 0    tamsulosin (FLOMAX) 0 4 mg, TAKE ONE CAPSULE BY MOUTH ONE TIME DAILY, Disp: 90 capsule, Rfl: 1    Ventolin  (90 Base) MCG/ACT inhaler, Inhale 2 puffs every 4 (four) hours as needed for wheezing, Disp: 18 g, Rfl: 5     Discussion with patient/family and further instructions:  -Fall precautions  -Aspiration precautions  -Bleeding precautions  -Monitor for signs/symptoms of infection  -Medication list was reviewed and signed  -DME form was completed    Status at time of discharge: Stable    Plan discussed with Dr Jose Patton noted agreement with assessment and plan  Billing based on time  Time spent on unit, 40 minutes  Time spent counseling pt on debility/condition, 30 minutes  Please note:  Voice-recognition software may have been used in the preparation of this document  Occasional wrong word or "sound-alike" substitutions may have occurred due to the inherent limitations of voice recognition software  Interpretation should be guided by context          TOPHER Liu  8/30/2022

## 2022-09-01 ENCOUNTER — TELEPHONE (OUTPATIENT)
Dept: FAMILY MEDICINE CLINIC | Facility: CLINIC | Age: 77
End: 2022-09-01

## 2022-09-01 NOTE — TELEPHONE ENCOUNTER
Patient is being discharged from nursing home on 9/1    Scheduled a TCM with Dr Shanti Rodriguez for 9/13

## 2022-09-02 ENCOUNTER — RA CDI HCC (OUTPATIENT)
Dept: OTHER | Facility: HOSPITAL | Age: 77
End: 2022-09-02

## 2022-09-02 DIAGNOSIS — G62.9 NEUROPATHY: ICD-10-CM

## 2022-09-02 RX ORDER — GABAPENTIN 300 MG/1
CAPSULE ORAL
Qty: 90 CAPSULE | Refills: 0 | Status: SHIPPED | OUTPATIENT
Start: 2022-09-02

## 2022-09-02 NOTE — ASSESSMENT & PLAN NOTE
· Patient complains of weak stream  · Patient states he feels he is emptying his bladder   · Denies any other urinary complaints on exam   · Continue finasteride and tamsulosin

## 2022-09-02 NOTE — PROGRESS NOTES
[facility]  coding opportunities          Chart Reviewed number of suggestions sent to Provider: 3     Patients Insurance     Medicare Insurance: [employer]

## 2022-09-02 NOTE — ASSESSMENT & PLAN NOTE
· BP stable at [facility]  · Metoprolol was discontinued due to recurrent COPD exacerbation  · Continue amlodipine 10 mg daily and chlorthalidone 25 mg daily  · Patient has history of orthostatic hypotension due to Parkinson's disease-continue p r n  midodrine for hypotension do not give if SBP greater than 140

## 2022-09-03 ENCOUNTER — HOSPITAL ENCOUNTER (INPATIENT)
Facility: HOSPITAL | Age: 77
LOS: 9 days | Discharge: NURSING, REHAB OR LONG-TERM CARE | DRG: 190 | End: 2022-09-12
Attending: EMERGENCY MEDICINE | Admitting: INTERNAL MEDICINE
Payer: MEDICARE

## 2022-09-03 ENCOUNTER — APPOINTMENT (OUTPATIENT)
Dept: RADIOLOGY | Facility: HOSPITAL | Age: 77
DRG: 190 | End: 2022-09-03
Payer: MEDICARE

## 2022-09-03 DIAGNOSIS — J43.1 PANLOBULAR EMPHYSEMA (HCC): ICD-10-CM
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
## 2022-09-03 DIAGNOSIS — G47.00 INSOMNIA, UNSPECIFIED TYPE: ICD-10-CM

## 2022-09-03 DIAGNOSIS — J96.20 ACUTE ON CHRONIC RESPIRATORY FAILURE, UNSPECIFIED WHETHER WITH HYPOXIA OR HYPERCAPNIA (HCC): ICD-10-CM

## 2022-09-03 DIAGNOSIS — G20 PARKINSON'S DISEASE (HCC): ICD-10-CM

## 2022-09-03 DIAGNOSIS — J44.1 COPD EXACERBATION (HCC): Primary | ICD-10-CM

## 2022-09-03 DIAGNOSIS — J44.1 COPD WITH ACUTE EXACERBATION (HCC): ICD-10-CM

## 2022-09-03 LAB
2HR DELTA HS TROPONIN: 1 NG/L
4HR DELTA HS TROPONIN: -2 NG/L
ALBUMIN SERPL BCP-MCNC: 4.1 G/DL (ref 3.5–5)
ALP SERPL-CCNC: 61 U/L (ref 34–104)
ALT SERPL W P-5'-P-CCNC: 32 U/L (ref 7–52)
ANION GAP SERPL CALCULATED.3IONS-SCNC: 10 MMOL/L (ref 4–13)
APTT PPP: 26 SECONDS (ref 23–37)
AST SERPL W P-5'-P-CCNC: 21 U/L (ref 13–39)
BASE EX.OXY STD BLDV CALC-SCNC: 84.8 % (ref 60–80)
BASE EXCESS BLDV CALC-SCNC: 2.1 MMOL/L
BASOPHILS # BLD AUTO: 0.03 THOUSANDS/ΜL (ref 0–0.1)
BASOPHILS NFR BLD AUTO: 0 % (ref 0–1)
BILIRUB SERPL-MCNC: 1.33 MG/DL (ref 0.2–1)
BNP SERPL-MCNC: 27 PG/ML (ref 0–100)
BUN SERPL-MCNC: 29 MG/DL (ref 5–25)
CALCIUM SERPL-MCNC: 9.1 MG/DL (ref 8.4–10.2)
CARDIAC TROPONIN I PNL SERPL HS: 11 NG/L
CARDIAC TROPONIN I PNL SERPL HS: 13 NG/L
CARDIAC TROPONIN I PNL SERPL HS: 14 NG/L
CHLORIDE SERPL-SCNC: 97 MMOL/L (ref 96–108)
CO2 SERPL-SCNC: 30 MMOL/L (ref 21–32)
CREAT SERPL-MCNC: 1.13 MG/DL (ref 0.6–1.3)
EOSINOPHIL # BLD AUTO: 0.07 THOUSAND/ΜL (ref 0–0.61)
EOSINOPHIL NFR BLD AUTO: 1 % (ref 0–6)
ERYTHROCYTE [DISTWIDTH] IN BLOOD BY AUTOMATED COUNT: 14.9 % (ref 11.6–15.1)
FLUAV RNA RESP QL NAA+PROBE: NEGATIVE
FLUBV RNA RESP QL NAA+PROBE: NEGATIVE
GFR SERPL CREATININE-BSD FRML MDRD: 61 ML/MIN/1.73SQ M
GLUCOSE SERPL-MCNC: 142 MG/DL (ref 65–140)
HCO3 BLDV-SCNC: 26.2 MMOL/L (ref 24–30)
HCT VFR BLD AUTO: 35.8 % (ref 36.5–49.3)
HGB BLD-MCNC: 12.8 G/DL (ref 12–17)
IMM GRANULOCYTES # BLD AUTO: 0.08 THOUSAND/UL (ref 0–0.2)
IMM GRANULOCYTES NFR BLD AUTO: 1 % (ref 0–2)
INR PPP: 0.96 (ref 0.84–1.19)
LACTATE SERPL-SCNC: 2.8 MMOL/L (ref 0.5–2)
LACTATE SERPL-SCNC: 3.6 MMOL/L (ref 0.5–2)
LYMPHOCYTES # BLD AUTO: 0.99 THOUSANDS/ΜL (ref 0.6–4.47)
LYMPHOCYTES NFR BLD AUTO: 12 % (ref 14–44)
MCH RBC QN AUTO: 33.5 PG (ref 26.8–34.3)
MCHC RBC AUTO-ENTMCNC: 35.8 G/DL (ref 31.4–37.4)
MCV RBC AUTO: 94 FL (ref 82–98)
MONOCYTES # BLD AUTO: 0.34 THOUSAND/ΜL (ref 0.17–1.22)
MONOCYTES NFR BLD AUTO: 4 % (ref 4–12)
NEUTROPHILS # BLD AUTO: 6.57 THOUSANDS/ΜL (ref 1.85–7.62)
NEUTS SEG NFR BLD AUTO: 82 % (ref 43–75)
NRBC BLD AUTO-RTO: 0 /100 WBCS
O2 CT BLDV-SCNC: 16.3 ML/DL
PCO2 BLDV: 39.3 MM HG (ref 42–50)
PH BLDV: 7.44 [PH] (ref 7.3–7.4)
PLATELET # BLD AUTO: 113 THOUSANDS/UL (ref 149–390)
PMV BLD AUTO: 9.4 FL (ref 8.9–12.7)
PO2 BLDV: 48.8 MM HG (ref 35–45)
POTASSIUM SERPL-SCNC: 3.2 MMOL/L (ref 3.5–5.3)
PROCALCITONIN SERPL-MCNC: 0.15 NG/ML
PROT SERPL-MCNC: 6.5 G/DL (ref 6.4–8.4)
PROTHROMBIN TIME: 13 SECONDS (ref 11.6–14.5)
RBC # BLD AUTO: 3.82 MILLION/UL (ref 3.88–5.62)
RSV RNA RESP QL NAA+PROBE: NEGATIVE
SARS-COV-2 RNA RESP QL NAA+PROBE: NEGATIVE
SODIUM SERPL-SCNC: 137 MMOL/L (ref 135–147)
WBC # BLD AUTO: 8.08 THOUSAND/UL (ref 4.31–10.16)

## 2022-09-03 PROCEDURE — 94760 N-INVAS EAR/PLS OXIMETRY 1: CPT

## 2022-09-03 PROCEDURE — 80053 COMPREHEN METABOLIC PANEL: CPT

## 2022-09-03 PROCEDURE — 0241U HB NFCT DS VIR RESP RNA 4 TRGT: CPT | Performed by: EMERGENCY MEDICINE

## 2022-09-03 PROCEDURE — 83880 ASSAY OF NATRIURETIC PEPTIDE: CPT | Performed by: EMERGENCY MEDICINE

## 2022-09-03 PROCEDURE — 94669 MECHANICAL CHEST WALL OSCILL: CPT

## 2022-09-03 PROCEDURE — 94762 N-INVAS EAR/PLS OXIMTRY CONT: CPT

## 2022-09-03 PROCEDURE — 85610 PROTHROMBIN TIME: CPT | Performed by: EMERGENCY MEDICINE

## 2022-09-03 PROCEDURE — 94668 MNPJ CHEST WALL SBSQ: CPT

## 2022-09-03 PROCEDURE — 87040 BLOOD CULTURE FOR BACTERIA: CPT | Performed by: EMERGENCY MEDICINE

## 2022-09-03 PROCEDURE — 85025 COMPLETE CBC W/AUTO DIFF WBC: CPT

## 2022-09-03 PROCEDURE — 93005 ELECTROCARDIOGRAM TRACING: CPT

## 2022-09-03 PROCEDURE — 82805 BLOOD GASES W/O2 SATURATION: CPT | Performed by: EMERGENCY MEDICINE

## 2022-09-03 PROCEDURE — 84145 PROCALCITONIN (PCT): CPT | Performed by: EMERGENCY MEDICINE

## 2022-09-03 PROCEDURE — 99223 1ST HOSP IP/OBS HIGH 75: CPT | Performed by: HOSPITALIST

## 2022-09-03 PROCEDURE — 83605 ASSAY OF LACTIC ACID: CPT | Performed by: EMERGENCY MEDICINE

## 2022-09-03 PROCEDURE — 85730 THROMBOPLASTIN TIME PARTIAL: CPT | Performed by: EMERGENCY MEDICINE

## 2022-09-03 PROCEDURE — 36415 COLL VENOUS BLD VENIPUNCTURE: CPT | Performed by: EMERGENCY MEDICINE

## 2022-09-03 PROCEDURE — 94640 AIRWAY INHALATION TREATMENT: CPT

## 2022-09-03 PROCEDURE — 71045 X-RAY EXAM CHEST 1 VIEW: CPT

## 2022-09-03 PROCEDURE — 94664 DEMO&/EVAL PT USE INHALER: CPT

## 2022-09-03 PROCEDURE — 94644 CONT INHLJ TX 1ST HOUR: CPT

## 2022-09-03 PROCEDURE — 84484 ASSAY OF TROPONIN QUANT: CPT | Performed by: EMERGENCY MEDICINE

## 2022-09-03 PROCEDURE — 99285 EMERGENCY DEPT VISIT HI MDM: CPT

## 2022-09-03 PROCEDURE — 99291 CRITICAL CARE FIRST HOUR: CPT | Performed by: EMERGENCY MEDICINE

## 2022-09-03 RX ORDER — GABAPENTIN 300 MG/1
300 CAPSULE ORAL 3 TIMES DAILY
Status: DISCONTINUED | OUTPATIENT
Start: 2022-09-03 | End: 2022-09-12 | Stop reason: HOSPADM

## 2022-09-03 RX ORDER — METHYLPREDNISOLONE SODIUM SUCCINATE 40 MG/ML
40 INJECTION, POWDER, LYOPHILIZED, FOR SOLUTION INTRAMUSCULAR; INTRAVENOUS EVERY 8 HOURS
Status: DISCONTINUED | OUTPATIENT
Start: 2022-09-03 | End: 2022-09-06

## 2022-09-03 RX ORDER — LEVALBUTEROL 1.25 MG/.5ML
1.25 SOLUTION, CONCENTRATE RESPIRATORY (INHALATION) EVERY 6 HOURS
Status: DISCONTINUED | OUTPATIENT
Start: 2022-09-03 | End: 2022-09-03

## 2022-09-03 RX ORDER — SODIUM CHLORIDE FOR INHALATION 0.9 %
3 VIAL, NEBULIZER (ML) INHALATION ONCE
Status: COMPLETED | OUTPATIENT
Start: 2022-09-03 | End: 2022-09-03

## 2022-09-03 RX ORDER — SODIUM CHLORIDE FOR INHALATION 0.9 %
3 VIAL, NEBULIZER (ML) INHALATION EVERY 6 HOURS PRN
Status: DISCONTINUED | OUTPATIENT
Start: 2022-09-03 | End: 2022-09-03

## 2022-09-03 RX ORDER — PANTOPRAZOLE SODIUM 40 MG/1
40 TABLET, DELAYED RELEASE ORAL ONCE
Status: COMPLETED | OUTPATIENT
Start: 2022-09-03 | End: 2022-09-03

## 2022-09-03 RX ORDER — AMLODIPINE BESYLATE 10 MG/1
10 TABLET ORAL DAILY
Status: DISCONTINUED | OUTPATIENT
Start: 2022-09-04 | End: 2022-09-12 | Stop reason: HOSPADM

## 2022-09-03 RX ORDER — MIRTAZAPINE 15 MG/1
30 TABLET, FILM COATED ORAL
Status: DISCONTINUED | OUTPATIENT
Start: 2022-09-03 | End: 2022-09-12 | Stop reason: HOSPADM

## 2022-09-03 RX ORDER — SODIUM CHLORIDE FOR INHALATION 0.9 %
3 VIAL, NEBULIZER (ML) INHALATION
Status: DISCONTINUED | OUTPATIENT
Start: 2022-09-03 | End: 2022-09-03

## 2022-09-03 RX ORDER — MIDODRINE HYDROCHLORIDE 5 MG/1
10 TABLET ORAL ONCE AS NEEDED
Status: DISCONTINUED | OUTPATIENT
Start: 2022-09-03 | End: 2022-09-03

## 2022-09-03 RX ORDER — ALBUTEROL SULFATE 2.5 MG/3ML
2.5 SOLUTION RESPIRATORY (INHALATION)
Status: DISCONTINUED | OUTPATIENT
Start: 2022-09-03 | End: 2022-09-03

## 2022-09-03 RX ORDER — CHOLESTYRAMINE LIGHT 4 G/5.7G
4 POWDER, FOR SUSPENSION ORAL ONCE
Refills: 0 | Status: COMPLETED | OUTPATIENT
Start: 2022-09-03 | End: 2022-09-03

## 2022-09-03 RX ORDER — METHYLPREDNISOLONE SOD SUCC 125 MG
1 VIAL (EA) INJECTION ONCE
Status: COMPLETED | OUTPATIENT
Start: 2022-09-03 | End: 2022-09-03

## 2022-09-03 RX ORDER — ENOXAPARIN SODIUM 100 MG/ML
40 INJECTION SUBCUTANEOUS DAILY
Status: DISCONTINUED | OUTPATIENT
Start: 2022-09-04 | End: 2022-09-12 | Stop reason: HOSPADM

## 2022-09-03 RX ORDER — LEVALBUTEROL 1.25 MG/.5ML
1.25 SOLUTION, CONCENTRATE RESPIRATORY (INHALATION) EVERY 6 HOURS PRN
Status: DISCONTINUED | OUTPATIENT
Start: 2022-09-03 | End: 2022-09-03

## 2022-09-03 RX ORDER — FINASTERIDE 5 MG/1
5 TABLET, FILM COATED ORAL EVERY MORNING
Status: DISCONTINUED | OUTPATIENT
Start: 2022-09-04 | End: 2022-09-12 | Stop reason: HOSPADM

## 2022-09-03 RX ORDER — TAMSULOSIN HYDROCHLORIDE 0.4 MG/1
0.4 CAPSULE ORAL DAILY
Status: DISCONTINUED | OUTPATIENT
Start: 2022-09-04 | End: 2022-09-12 | Stop reason: HOSPADM

## 2022-09-03 RX ORDER — BUDESONIDE 0.5 MG/2ML
0.5 INHALANT ORAL
Status: DISCONTINUED | OUTPATIENT
Start: 2022-09-03 | End: 2022-09-12 | Stop reason: HOSPADM

## 2022-09-03 RX ORDER — GABAPENTIN 300 MG/1
300 CAPSULE ORAL 3 TIMES DAILY
Status: DISCONTINUED | OUTPATIENT
Start: 2022-09-03 | End: 2022-09-03

## 2022-09-03 RX ORDER — SODIUM CHLORIDE 9 MG/ML
75 INJECTION, SOLUTION INTRAVENOUS CONTINUOUS
Status: DISCONTINUED | OUTPATIENT
Start: 2022-09-03 | End: 2022-09-04

## 2022-09-03 RX ORDER — IPRATROPIUM BROMIDE AND ALBUTEROL SULFATE .5; 3 MG/3ML; MG/3ML
1 SOLUTION RESPIRATORY (INHALATION) ONCE
Status: COMPLETED | OUTPATIENT
Start: 2022-09-03 | End: 2022-09-03

## 2022-09-03 RX ORDER — FORMOTEROL FUMARATE 20 UG/2ML
20 SOLUTION RESPIRATORY (INHALATION)
Status: DISCONTINUED | OUTPATIENT
Start: 2022-09-03 | End: 2022-09-12 | Stop reason: HOSPADM

## 2022-09-03 RX ORDER — GABAPENTIN 100 MG/1
200 CAPSULE ORAL 2 TIMES DAILY
Status: DISCONTINUED | OUTPATIENT
Start: 2022-09-03 | End: 2022-09-03

## 2022-09-03 RX ORDER — LEVALBUTEROL 1.25 MG/.5ML
1.25 SOLUTION, CONCENTRATE RESPIRATORY (INHALATION)
Status: DISCONTINUED | OUTPATIENT
Start: 2022-09-03 | End: 2022-09-12 | Stop reason: HOSPADM

## 2022-09-03 RX ORDER — ALBUTEROL SULFATE 2.5 MG/3ML
2.5 SOLUTION RESPIRATORY (INHALATION) EVERY 4 HOURS PRN
Status: DISCONTINUED | OUTPATIENT
Start: 2022-09-03 | End: 2022-09-12 | Stop reason: HOSPADM

## 2022-09-03 RX ORDER — POTASSIUM CHLORIDE 20 MEQ/1
40 TABLET, EXTENDED RELEASE ORAL ONCE
Status: COMPLETED | OUTPATIENT
Start: 2022-09-03 | End: 2022-09-03

## 2022-09-03 RX ORDER — AMLODIPINE BESYLATE 5 MG/1
10 TABLET ORAL DAILY
Status: CANCELLED | OUTPATIENT
Start: 2022-09-04

## 2022-09-03 RX ORDER — CHLORTHALIDONE 25 MG/1
25 TABLET ORAL DAILY
Status: DISCONTINUED | OUTPATIENT
Start: 2022-09-04 | End: 2022-09-12 | Stop reason: HOSPADM

## 2022-09-03 RX ORDER — BUSPIRONE HYDROCHLORIDE 5 MG/1
10 TABLET ORAL 3 TIMES DAILY
Status: DISCONTINUED | OUTPATIENT
Start: 2022-09-03 | End: 2022-09-12 | Stop reason: HOSPADM

## 2022-09-03 RX ADMIN — MIRTAZAPINE 30 MG: 15 TABLET, FILM COATED ORAL at 21:00

## 2022-09-03 RX ADMIN — CHOLESTYRAMINE 4 G: 4 POWDER, FOR SUSPENSION ORAL at 19:49

## 2022-09-03 RX ADMIN — CARBIDOPA AND LEVODOPA 1 TABLET: 25; 100 TABLET ORAL at 19:49

## 2022-09-03 RX ADMIN — ALBUTEROL SULFATE 10 MG: 2.5 SOLUTION RESPIRATORY (INHALATION) at 15:17

## 2022-09-03 RX ADMIN — IPRATROPIUM BROMIDE 1 MG: 0.5 SOLUTION RESPIRATORY (INHALATION) at 15:17

## 2022-09-03 RX ADMIN — METHYLPREDNISOLONE SODIUM SUCCINATE 40 MG: 40 INJECTION, POWDER, FOR SOLUTION INTRAMUSCULAR; INTRAVENOUS at 19:49

## 2022-09-03 RX ADMIN — BUSPIRONE HYDROCHLORIDE 10 MG: 5 TABLET ORAL at 21:00

## 2022-09-03 RX ADMIN — PANTOPRAZOLE SODIUM 40 MG: 40 TABLET, DELAYED RELEASE ORAL at 19:49

## 2022-09-03 RX ADMIN — POTASSIUM CHLORIDE 40 MEQ: 1500 TABLET, EXTENDED RELEASE ORAL at 16:57

## 2022-09-03 RX ADMIN — IPRATROPIUM BROMIDE 0.5 MG: 0.5 SOLUTION RESPIRATORY (INHALATION) at 19:45

## 2022-09-03 RX ADMIN — LEVALBUTEROL HYDROCHLORIDE 1.25 MG: 1.25 SOLUTION, CONCENTRATE RESPIRATORY (INHALATION) at 19:45

## 2022-09-03 RX ADMIN — FORMOTEROL FUMARATE DIHYDRATE 20 MCG: 20 SOLUTION RESPIRATORY (INHALATION) at 20:06

## 2022-09-03 RX ADMIN — ISODIUM CHLORIDE 3 ML: 0.03 SOLUTION RESPIRATORY (INHALATION) at 15:17

## 2022-09-03 RX ADMIN — GABAPENTIN 300 MG: 300 CAPSULE ORAL at 21:00

## 2022-09-03 RX ADMIN — BUDESONIDE 0.5 MG: 0.5 INHALANT ORAL at 19:45

## 2022-09-03 RX ADMIN — SODIUM CHLORIDE 75 ML/HR: 0.9 INJECTION, SOLUTION INTRAVENOUS at 19:49

## 2022-09-03 NOTE — H&P
[facility]  H&P- Liza Baumgarten 1945, 65 y o  male MRN: [id number]  Unit/Bed#: S -01 Encounter: [id number]  Primary Care Provider: Rancho Alberto MD   Date and time admitted to hospital: 9/3/2022  1:55 PM    COPD with acute exacerbation [facility])  Assessment & Plan  Patient returning to the hospital for another severe exacerbation of his COPD  Last admission 8/18 1 exacerbation of COPD and was on tapered dose of prednisone and required antibiotic azithromycin  Several readmissions in the past 6 months for COPD exacerbation  Workup for this admission shows no evidence of infection  WBC normal, broke all normal, chest x-ray no evidence of cardiopulmonary disease, COVID/flu/RSV negative    Plan    -IV Solu-Medrol 40 mg every 8 hours  · Atrovent/Xopenex q i d  · Continue performist and Pulmicort  · Respiratory protocol, airway clearance, antitussives and incentive spirometry  · Pulmonology consult considering recurrent admissions for COPD exacerbation in the last month    · Monitor oxygen requirements  · No antibiotic requirements at this time  · At this point with several readmissions for severe COPD exacerbation goals of care will be discussed with the patient and his family if they are agreeable  · Pulmonary rehab  · Palliative care will also be consulted      Acute on chronic respiratory failure ([address] )  Assessment & Plan    · Chronically on 3 L oxygen secondary to centrilobular emphysema and alpha 1 antitrypsin deficiency  · Chest x-ray no evidence of cardiopulmonary disease  · Monitor oxygen requirements and keep SpO2 over 88%  · Currently at baseline 3 L NC  · Titrate for O2 for SpO2 80-94%  · Continue incentive spirometry  · Treat for COPD exacerbation  · Pulmonology consulted      Ambulatory dysfunction  Assessment & Plan  Multifactorial, age, Parkinson's disease, peripheral neuropathy, chronic medical conditions  - fall precautions  - OT/PT/RT  - optimize chronic medical conditions    Lactic acidosis  Assessment & Plan  Be due to current COPD exacerbation    SIRS (systemic inflammatory response syndrome) ([facility] [address] )  Assessment & Plan  Patient presented to the ED with tachycardia and tachypnea  No fever, no evidence of infection at this time  White blood cell normal, procalcitonin normal, blood cultures pending    Parkinson disease ([facility] [address] )  Assessment & Plan  Continue sinemet    Depression, recurrent ([facility] [address] )  Assessment & Plan  Continue this peer on 10 mg and mirtazapine 15 mg    Chronic pulmonary embolism [facility])  Assessment & Plan  Per chart review, patient completed 6-month course of Eliquis per oncology in 2018  If patient develops another acute PE then will need lifelong AC  - No need for systemic anticoagulation at this time      Essential hypertension  Assessment & Plan  Continue home medication    Alpha-1-antitrypsin deficiency [facility])  Assessment & Plan  On Zemaira infusion per pulmonology  Unable to get his infusions for the past 1 month because he was hospitalized and then had rehab       VTE Pharmacologic Prophylaxis: VTE Score: 11 High Risk (Score >/= 5) - Pharmacological DVT Prophylaxis Ordered: enoxaparin (Lovenox)  Sequential Compression Devices Ordered  Code Status: Level 1 - Full Code full  Discussion with family: contact unavailable at this time per patient  Anticipated Length of Stay: Patient will be admitted on an inpatient basis with an anticipated length of stay of greater than 2 midnights secondary to Severe COPD exacerbation, ambulatory dysfunction      Chief Complaint:  Shortness of breath and generalized weakness    History of Present Illness:  Thai García is a 65 y o  male with a PMH of severe COPD (FEV1 40-45%), A1AT def on Zemaira, chronic hypoxic respiratory failure 3LNC, prior PE with chronic mural thrombus left main PA, parkinson's disease, HTN, and depression who presented for increased dyspnea after recent admission for acute exacerbation of COPD and rehab stay  He has been having recurrent exacerbations and has not been able to attend outpatient pulmonary rehab yet  increased shortness of breath, wheezing, generalized weakness  Patient also endorsed a fall due to lightheadedness that led to loss of balance when trying to bend forward  Denies head or body trauma, loss of consciousness, patient on no blood thinners  Patient was recently admitted due to COPD exacerbation and was discharged on 8/16 to the post acute rehab  Patient completed a course of steroids taper and was recommended pulmonology consult in the outpatient setting   Was discharged on Thursday from rehab and at home he noted to be weak, more short of breath  Patient reports compliance with his nebulizers and medications  Has a cough which is intermittent however denies purulent or excess sputum  Denied fever chills,  sore throat, chest pain  He complained of being more lightheaded then normal   No headaches, no blurred vision, noting weakness, no seizures  Patient is with severe COPD on 3 L O2 at baseline  On admission patient was tachypneic and tachycardic meeting SIRS criteria with no evidence of infection at this  He was getting dyspneic with 2 words  Required 4 L O 2 but  was sat 100 %  COPD exacerbation protocol started; steroids, LABA, LAMA          Review of Systems:  Review of Systems   Constitutional: Positive for activity change and fatigue  Negative for chills, fever and unexpected weight change  HENT: Negative for congestion, drooling, rhinorrhea, sinus pain and trouble swallowing  Respiratory: Positive for cough, shortness of breath and wheezing  Negative for apnea, choking, chest tightness and stridor  Cardiovascular: Negative for chest pain, palpitations and leg swelling  Gastrointestinal: Negative for abdominal distention, abdominal pain, constipation and diarrhea  Genitourinary: Negative for dysuria, flank pain and frequency     Musculoskeletal: Positive for gait problem  Negative for arthralgias  Skin: Negative for color change  Neurological: Positive for tremors and light-headedness  Negative for dizziness, seizures, facial asymmetry, speech difficulty, weakness and headaches  Psychiatric/Behavioral: Negative for agitation, confusion and decreased concentration  Past Medical and Surgical History:   Past Medical History:   Diagnosis Date    Anesthesia complication     Difficult to wake up    Arthritis     BPH (benign prostatic hyperplasia)     urinary frequency    Cancer (HCC)     basal cell neck, face    CKD (chronic kidney disease) stage 2, GFR 60-89 ml/min 07/04/2022    COPD [facility]ch[facility] obstructive pulmonary disease) ([facility])     COPD exacerbation ([facility]) 12/29/2019    Coronary artery disease     Elevated PSA 10/25/2018    Full dentures     Hiatal hernia     History of methicillin resistant staphylococcus aureus (MRSA)     10/11/2018 MRSA (nares) positive    Hypertension     Irritable bowel syndrome     Kidney stone     at least 7 episodes    Liver disease     Alpha 1- enzyme deficiency - diagnosed 2002  has been on weekly replacement therapy since then    Parkinson disease ([facility] [address] )     Pulmonary emphysema ([facility] [address] )     1/25/15  FEV1 - 2 45 liters or 59% of predicted    RSV infection 12/2017    SIRS (systemic inflammatory response syndrome) ([facility] [address] ) 8/14/2022    Wears glasses     for driving only       Past Surgical History:   Procedure Laterality Date    BACK SURGERY  2008    discectomy    CARDIAC CATHETERIZATION N/A 5/3/2022    Procedure: Cardiac catheterization both left and right heart catheterization with vaso dilatory trial;  Surgeon: Rl Becerril MD;  Location: [place] [address] CATH LAB; Service: Cardiology    CARDIAC CATHETERIZATION Left 5/3/2022    Procedure: Cardiac Left Heart Cath;  Surgeon: Rl Becerril MD;  Location: [place] [address] CATH LAB;   Service: Cardiology    CARDIAC CATHETERIZATION Left 5/3/2022    Procedure: Cardiac Left Ventriculogram;  Surgeon: Silvano Nazario MD;  Location: [place] [address] CATH LAB; Service: Cardiology    COLONOSCOPY      COLONOSCOPY N/A 3/10/2017    Procedure: Burton Pall;  Surgeon: Savita Luna MD;  Location: [place] [address] GI LAB; Service:    Alejandracharmaine Maddoxerel      removal of kidney stones    ESOPHAGOGASTRODUODENOSCOPY N/A 3/10/2017    Procedure: ESOPHAGOGASTRODUODENOSCOPY (EGD); Surgeon: Savita Luna MD;  Location: [facility] GI LAB; Service:     LITHOTRIPSY      MO ESOPHAGOGASTRODUODENOSCOPY TRANSORAL DIAGNOSTIC N/A 11/20/2018    Procedure: ESOPHAGOGASTRODUODENOSCOPY (EGD); Surgeon: Savita Luna MD;  Location: [facility] GI LAB; Service: Gastroenterology    TONSILLECTOMY      VEIN LIGATION AND STRIPPING Bilateral     1980's       Meds/Allergies:  Prior to Admission medications    Medication Sig Start Date End Date Taking?  Authorizing Provider   amLODIPine (NORVASC) 10 mg tablet TAKE ONE TABLET BY MOUTH DAILY  11/5/21  Yes Kim Ramirez MD   busPIRone (BUSPAR) 10 mg tablet TAKE 1 TABLET BY MOUTH 3 TIMES DAILY 7/5/22  Yes Gracy Reno MD   carbidopa-levodopa (SINEMET)  mg per tablet take 1 tablet by mouth prior to each meal 7/5/22  Yes Joseph Garrison MD   chlorthalidone 25 mg tablet Take 1 tablet (25 mg total) by mouth daily 7/15/22 10/13/22 Yes Nancy Givens MD   cholestyramine [facility]note 3]) 4 g packet Take 1 packet (4 g total) by mouth in the morning  5/11/22  Yes TOPHER Mosley   finasteride (PROSCAR) 5 mg tablet TAKE ONE TABLET BY MOUTH IN THE MORNING  10/21/21  Yes Kim Ramirez MD   fluticasone [facility]) 50 mcg/act nasal spray 2 sprays into each nostril daily 7/6/20  Yes Valery Faustin MD   formoterol (PERFOROMIST) 20 MCG/2ML nebulizer solution Take 2 mL (20 mcg total) by nebulization every 12 (twelve) hours 8/18/22 9/17/22 Yes Quentin Kovacs MD   gabapentin (NEURONTIN) 300 mg capsule TAKE 1 CAPSULE BY MOUTH 3 TIMES DAILY 9/2/22  Yes Gracy Reno MD   ipratropium (ATROVENT) 0 02 % nebulizer solution Take 2 5 mL (0 5 mg total) by nebulization 3 (three) times a day 8/18/22 9/17/22 Yes Jose Lopez MD   levalbuterol (XOPENEX) 1 25 mg/0 5 mL nebulizer solution Take 0 5 mL (1 25 mg total) by nebulization 3 (three) times a day 8/18/22 9/17/22 Yes Jose Lopez MD   mirtazapine (REMERON) 15 mg tablet Take 2 tablets (30 mg total) by mouth daily at bedtime 5/23/22  Yes Diomedes Cheney DO   pantoprazole (PROTONIX) 40 mg tablet TAKE ONE TABLET BY MOUTH TWICE DAILY 6/1/22  Yes Duane Ser, MD   roflumilast (DALIRESP) 250 MCG tablet Take 1 tablet (250 mcg total) by mouth daily for 28 days 8/17/22 9/14/22 Yes Ryanne Hall MD   tamsulosin (FLOMAX) 0 4 mg TAKE ONE CAPSULE BY MOUTH ONE TIME DAILY 5/12/22  Yes Duane Ser, MD   Ventolin  (90 Base) MCG/ACT inhaler Inhale 2 puffs every 4 (four) hours as needed for wheezing 11/8/21  Yes Vannesa Macias PA-C   budesonide (PULMICORT) 0 5 mg/2 mL nebulizer solution Take 2 mL (0 5 mg total) by nebulization in the morning and 2 mL (0 5 mg total) in the evening  5/11/22 8/9/22  TOPHER Mosley   gabapentin (NEURONTIN) 100 mg capsule Take 2 capsules (200 mg total) by mouth 2 (two) times a day 6/22/22   TOPHER Mosley   midodrine (PROAMATINE) 10 MG tablet Take 1 tab three times daily AS NEEDED if SBP less than 100  Please hold medication if blood pressure is above 752 systolic  6/44/96   TOPHER Mosley     I have reviewed home medications with patient personally  Allergies: Allergies   Allergen Reactions    Chantix [Varenicline]      Caused prostate infection       Social History:  Marital Status:     Occupation:   Patient Pre-hospital Living Situation: Home  Patient Pre-hospital Level of Mobility: some gait instability  Patient Pre-hospital Diet Restrictions: no  Substance Use History:   Social History     Substance and Sexual Activity   Alcohol Use Never    Comment: stopped in 2009     Social History     Tobacco Use   Smoking Status Former Smoker    Packs/day: 1 00    Years: 60 00    Pack years: 60 00    Quit date: 2017    Years since quittin 0   Smokeless Tobacco Never Used   Tobacco Comment    quit in 2017     Social History     Substance and Sexual Activity   Drug Use Not Currently       Family History:  Family History   Problem Relation Age of Onset    Emphysema Mother         never smoked    Emphysema Father     Cancer Brother         colon    Colon cancer Brother     Ulcerative colitis Family     Liver disease Family        Physical Exam:     Vitals:   Blood Pressure: 151/79 (22)  Pulse: 104 (22)  Temperature: (!) 97 4 °F (36 3 °C) (22)  Temp Source: Oral (22 1358)  Respirations: 18 (22)  SpO2: 100 % (22 1608)    Physical Exam  Constitutional:       Appearance: Normal appearance  He is ill-appearing  HENT:      Head: Normocephalic and atraumatic  Nose: No congestion or rhinorrhea  Mouth/Throat:      Mouth: Mucous membranes are moist    Eyes:      Conjunctiva/sclera: Conjunctivae normal    Cardiovascular:      Rate and Rhythm: Regular rhythm  Tachycardia present  Pulses: Normal pulses  Heart sounds: Normal heart sounds  No murmur heard  Pulmonary:      Effort: Respiratory distress present  Breath sounds: No stridor  Wheezing present  No rhonchi or rales  Abdominal:      General: Bowel sounds are normal  There is no distension  Palpations: Abdomen is soft  Tenderness: There is no abdominal tenderness  Musculoskeletal:      Cervical back: Neck supple  Skin:     General: Skin is dry  Neurological:      General: No focal deficit present  Mental Status: He is alert and oriented to person, place, and time           Additional Data:     Lab Results:  Results from last 7 days   Lab Units 22  1454   WBC Thousand/uL 8 08   HEMOGLOBIN g/dL 12 8   HEMATOCRIT % 35 8*   PLATELETS Thousands/uL 113*   NEUTROS PCT % 82*   LYMPHS PCT % 12*   MONOS PCT % 4   EOS PCT % 1     Results from last 7 days   Lab Units 09/03/22  1454   SODIUM mmol/L 137   POTASSIUM mmol/L 3 2*   CHLORIDE mmol/L 97   CO2 mmol/L 30   BUN mg/dL 29*   CREATININE mg/dL 1 13   ANION GAP mmol/L 10   CALCIUM mg/dL 9 1   ALBUMIN g/dL 4 1   TOTAL BILIRUBIN mg/dL 1 33*   ALK PHOS U/L 61   ALT U/L 32   AST U/L 21   GLUCOSE RANDOM mg/dL 142*     Results from last 7 days   Lab Units 09/03/22  1454   INR  0 96             Results from last 7 days   Lab Units 09/03/22  1658 09/03/22  1454   LACTIC ACID mmol/L 3 6* 2 8*   PROCALCITONIN ng/ml  --  0 15       Imaging: Reviewed radiology reports from this admission including: chest xray  XR chest 1 view portable    (Results Pending)       EKG and Other Studies Reviewed on Admission:   · EKG: NSR  HR 83  Tobacco and Toxic Substance Assessment and Intervention:     Tobacco use screening performed    Alcohol and drug use screening performed        ** Please Note: This note has been constructed using a voice recognition system   **

## 2022-09-03 NOTE — ED PROVIDER NOTES
History  Chief Complaint   Patient presents with    Shortness of Breath     Worsening this AM, on 4L home O2  Received Duo neb x1 and Solumedrol x1 from EMS   Fall     "I slipped or tripped while making lunch  I slid to the floor against my counters " +skin tear to LUE  -head strike, -thinners, -LOC  Pt [facility] for EMS and on arrival to ED  Mariela Carrillo is a 65year old male with PMH severe COPD on 3 L O2 at baseline, BPH, hypertension, CKD stage 2, admitted with due increased shortness of breath, wheezing, generalized weakness  Patient also endorsed a fall due to lightheadedness that led to loss of balance when trying to bend forward  Denies head or body trauma, loss of consciousness, patient on no blood thinners  Patient was recently admitted due to COPD exacerbation and was discharged on 8/16 to the post acute rehab  Patient completed a course of steroids taper and was recommended pulmonology consult in the outpatient setting   Was discharged on Thursday from rehab and at home he noted to be weak, more short of breath  Patient reports compliance with his nebulizers and medications  Denied fever chills, productive cough, sore throat, chest pain  He complained of being more lightheaded then normal  Patient is with severe COPD on 3 L O2 at baseline  On admission patient was tachypneic and tachycardic  He was getting dyspneic with 2 words  Required 4 L O 2 but  was sat 100 %              Prior to Admission Medications   Prescriptions Last Dose Informant Patient Reported? Taking? Ventolin  (90 Base) MCG/ACT inhaler 9/3/2022 at Unknown time Self No Yes   Sig: Inhale 2 puffs every 4 (four) hours as needed for wheezing   amLODIPine (NORVASC) 10 mg tablet 9/3/2022 at Unknown time Self No Yes   Sig: TAKE ONE TABLET BY MOUTH DAILY    budesonide (PULMICORT) 0 5 mg/2 mL nebulizer solution   No No   Sig: Take 2 mL (0 5 mg total) by nebulization in the morning and 2 mL (0 5 mg total) in the evening  busPIRone (BUSPAR) 10 mg tablet 9/3/2022 at Unknown time  No Yes   Sig: TAKE 1 TABLET BY MOUTH 3 TIMES DAILY   carbidopa-levodopa (SINEMET)  mg per tablet 9/3/2022 at Unknown time  No Yes   Sig: take 1 tablet by mouth prior to each meal   chlorthalidone 25 mg tablet 9/3/2022 at Unknown time  No Yes   Sig: Take 1 tablet (25 mg total) by mouth daily   cholestyramine (QUESTRAN) 4 g packet 9/3/2022 at Unknown time  No Yes   Sig: Take 1 packet (4 g total) by mouth in the morning  finasteride (PROSCAR) 5 mg tablet 9/3/2022 at Unknown time Self No Yes   Sig: TAKE ONE TABLET BY MOUTH IN THE MORNING    fluticasone (FLONASE) 50 mcg/act nasal spray 2022 at Unknown time Self No Yes   Si sprays into each nostril daily   formoterol (PERFOROMIST) 20 MCG/2ML nebulizer solution 9/3/2022 at Unknown time  No Yes   Sig: Take 2 mL (20 mcg total) by nebulization every 12 (twelve) hours   gabapentin (NEURONTIN) 100 mg capsule   No No   Sig: Take 2 capsules (200 mg total) by mouth 2 (two) times a day   gabapentin (NEURONTIN) 300 mg capsule 9/3/2022 at Unknown time  No Yes   Sig: TAKE 1 CAPSULE BY MOUTH 3 TIMES DAILY   ipratropium (ATROVENT) 0 02 % nebulizer solution 9/3/2022 at Unknown time  No Yes   Sig: Take 2 5 mL (0 5 mg total) by nebulization 3 (three) times a day   levalbuterol (XOPENEX) 1 25 mg/0 5 mL nebulizer solution 9/3/2022 at Unknown time  No Yes   Sig: Take 0 5 mL (1 25 mg total) by nebulization 3 (three) times a day   midodrine (PROAMATINE) 10 MG tablet Unknown at Unknown time  No No   Sig: Take 1 tab three times daily AS NEEDED if SBP less than 100   Please hold medication if blood pressure is above 351 systolic    mirtazapine (REMERON) 15 mg tablet 2022 at Unknown time  No Yes   Sig: Take 2 tablets (30 mg total) by mouth daily at bedtime   pantoprazole (PROTONIX) 40 mg tablet 9/3/2022 at Unknown time  No Yes   Sig: TAKE ONE TABLET BY MOUTH TWICE DAILY   roflumilast (DALIRESP) 250 MCG tablet 9/3/2022 at Unknown time  No Yes   Sig: Take 1 tablet (250 mcg total) by mouth daily for 28 days   tamsulosin (FLOMAX) 0 4 mg 9/3/2022 at Unknown time  No Yes   Sig: TAKE ONE CAPSULE BY MOUTH ONE TIME DAILY      Facility-Administered Medications: None       Past Medical History:   Diagnosis Date    Anesthesia complication     Difficult to wake up    Arthritis     BPH (benign prostatic hyperplasia)     urinary frequency    Cancer (HCC)     basal cell neck, face    CKD (chronic kidney disease) stage 2, GFR 60-89 ml/min 07/04/2022    [facility]     COPD exacerbation ([facility] ) 12/29/2019    Coronary artery disease     Elevated PSA 10/25/2018    Full dentures     Hiatal hernia     History of methicillin resistant staphylococcus aureus (MRSA)     10/11/2018 MRSA (nares) positive    Hypertension     Irritable bowel syndrome     Kidney stone     at least 7 episodes    Liver disease     Alpha 1- enzyme deficiency - diagnosed 2002  has been on weekly replacement therapy since then    Parkinson disease ([facility] )     Pulmonary emphysema ([place] [address] )     1/25/15  FEV1 - 2 45 liters or 59% of predicted    RSV infection 12/2017    SIRS (systemic inflammatory response syndrome) ([place] [address] ) 8/14/2022    Wears glasses     for driving only       Past Surgical History:   Procedure Laterality Date    BACK SURGERY  2008    discectomy    CARDIAC CATHETERIZATION N/A 5/3/2022    Procedure: Cardiac catheterization both left and right heart catheterization with vaso dilatory trial;  Surgeon: Silvano Nazario MD;  Location: [place] [address] CATH LAB; Service: Cardiology    CARDIAC CATHETERIZATION Left 5/3/2022    Procedure: Cardiac Left Heart Cath;  Surgeon: Silvano Nazario MD;  Location: [place] [address] CATH LAB; Service: Cardiology    CARDIAC CATHETERIZATION Left 5/3/2022    Procedure: Cardiac Left Ventriculogram;  Surgeon: Silvano Nazario MD;  Location: [place] [address] CATH LAB;   Service: Cardiology    COLONOSCOPY      COLONOSCOPY N/A 3/10/2017    Procedure: Burton Pall;  Surgeon: Savita Luna MD;  Location: [place] [address] GI LAB; Service:    Alejandra Mclaughlin      removal of kidney stones    ESOPHAGOGASTRODUODENOSCOPY N/A 3/10/2017    Procedure: ESOPHAGOGASTRODUODENOSCOPY (EGD); Surgeon: Savita Luna MD;  Location: [facility] GI LAB; Service:     LITHOTRIPSY      GA ESOPHAGOGASTRODUODENOSCOPY TRANSORAL DIAGNOSTIC N/A 2018    Procedure: ESOPHAGOGASTRODUODENOSCOPY (EGD); Surgeon: Savita Luna MD;  Location: [facility] GI LAB; Service: Gastroenterology    TONSILLECTOMY      VEIN LIGATION AND STRIPPING Bilateral     18's       Family History   Problem Relation Age of Onset    Emphysema Mother         never smoked    Emphysema Father     Cancer Brother         colon    Colon cancer Brother     Ulcerative colitis Family     Liver disease Family      I have reviewed and agree with the history as documented  E-Cigarette/Vaping    E-Cigarette Use Never User      E-Cigarette/Vaping Substances    Nicotine No     THC No     CBD No     Flavoring No     Other No     Unknown No      Social History     Tobacco Use    Smoking status: Former Smoker     Packs/day: 1 00     Years: 60 00     Pack years: 60 00     Quit date: 2017     Years since quittin 0    Smokeless tobacco: Never Used    Tobacco comment: quit in 2017   Vaping Use    Vaping Use: Never used   Substance Use Topics    Alcohol use: Never     Comment: stopped in     Drug use: Not Currently        Review of Systems   Constitutional: Positive for fatigue  Negative for chills and fever  HENT: Negative for ear pain, rhinorrhea and sore throat  Eyes: Negative for pain and visual disturbance  Respiratory: Positive for cough (Dry cough), shortness of breath and wheezing  Cardiovascular: Negative for chest pain and palpitations  Gastrointestinal: Negative for abdominal pain, diarrhea, nausea and vomiting     Genitourinary: Negative for dysuria and hematuria  Musculoskeletal: Negative for arthralgias and back pain  Skin: Negative for color change and rash  Neurological: Positive for weakness and light-headedness  Negative for dizziness, seizures, syncope and headaches  All other systems reviewed and are negative  Physical Exam  ED Triage Vitals [09/03/22 1358]   Temperature Pulse Respirations Blood Pressure SpO2   98 2 °F (36 8 °C) 99 (!) 24 162/74 96 %      Temp Source Heart Rate Source Patient Position - Orthostatic VS BP Location FiO2 (%)   Oral Monitor Sitting Right arm --      Pain Score       No Pain             Orthostatic Vital Signs  Vitals:    09/03/22 1358 09/03/22 1527 09/03/22 1608   BP: 162/74 156/77 156/75   Pulse: 99 87 84   Patient Position - Orthostatic VS: Sitting Sitting        Physical Exam  Vitals and nursing note reviewed  Constitutional:       Appearance: He is well-developed  He is ill-appearing  HENT:      Head: Normocephalic and atraumatic  Eyes:      Conjunctiva/sclera: Conjunctivae normal    Cardiovascular:      Rate and Rhythm: Normal rate and regular rhythm  Heart sounds: No murmur heard  Pulmonary:      Effort: Pulmonary effort is normal  No respiratory distress  Breath sounds: Decreased breath sounds and wheezing present  No rhonchi or rales  Chest:      Chest wall: No tenderness or crepitus  Abdominal:      Palpations: Abdomen is soft  Tenderness: There is no abdominal tenderness  Musculoskeletal:      Cervical back: Neck supple  Right lower leg: No edema  Left lower leg: No edema  Skin:     General: Skin is warm and dry  Coloration: Skin is pale  Neurological:      General: No focal deficit present  Mental Status: He is alert and oriented to person, place, and time           ED Medications  Medications   methylPREDNISolone sodium succinate (FOR EMS ONLY) (Solu-MEDROL) 125 MG injection 125 mg (0 mg Does not apply Given to EMS 9/3/22 1402) ipratropium-albuterol (FOR EMS ONLY) (DUO-NEB) 0 5-2 5 mg/3 mL inhalation solution 3 mL (0 mL Does not apply Given to EMS 9/3/22 1402)   albuterol inhalation solution 10 mg (10 mg Nebulization Given 9/3/22 1517)     And   ipratropium (ATROVENT) 0 02 % inhalation solution 1 mg (1 mg Nebulization Given 9/3/22 1517)     And   sodium chloride 0 9 % inhalation solution 3 mL (3 mL Nebulization Given 9/3/22 1517)   potassium chloride (K-DUR,KLOR-CON) CR tablet 40 mEq (40 mEq Oral Given 9/3/22 1657)       Diagnostic Studies  Results Reviewed     Procedure Component Value Units Date/Time    HS Troponin I 2hr [[id number]]  (Normal) Collected: 09/03/22 1658    Lab Status: Final result Specimen: Blood from Arm, Left Updated: 09/03/22 1732     hs TnI 2hr 14 ng/L      Delta 2hr hsTnI 1 ng/L     Lactic acid 2 Hours [[id number]]  (Abnormal) Collected: 09/03/22 1658    Lab Status: Final result Specimen: Blood from Arm, Left Updated: 09/03/22 1732     LACTIC ACID 3 6 mmol/L     Narrative:      Result may be elevated if tourniquet was used during collection  FLU/RSV/COVID - if FLU/RSV clinically relevant [[id number]]  (Normal) Collected: 09/03/22 1618    Lab Status: Final result Specimen: Nares from Nasopharyngeal Swab Updated: 09/03/22 1703     SARS-CoV-2 Negative     INFLUENZA A PCR Negative     INFLUENZA B PCR Negative     RSV PCR Negative    Narrative:      FOR PEDIATRIC PATIENTS - copy/paste COVID Guidelines URL to browser: [email or web address]    SARS-CoV-2 assay is a Nucleic Acid Amplification assay intended for the  qualitative detection of nucleic acid from SARS-CoV-2 in nasopharyngeal  swabs  Results are for the presumptive identification of SARS-CoV-2 RNA  Positive results are indicative of infection with SARS-CoV-2, the virus  causing COVID-19, but do not rule out bacterial infection or co-infection  with other viruses   Laboratories within the [place] and its  territories are required to report all positive results to the appropriate  public health authorities  Negative results do not preclude SARS-CoV-2  infection and should not be used as the sole basis for treatment or other  patient management decisions  Negative results must be combined with  clinical observations, patient history, and epidemiological information  This test has not been FDA cleared or approved  This test has been authorized by FDA under an Emergency Use Authorization  (EUA)  This test is only authorized for the duration of time the  declaration that circumstances exist justifying the authorization of the  emergency use of an in vitro diagnostic tests for detection of SARS-CoV-2  virus and/or diagnosis of COVID-19 infection under section 564(b)(1) of  the Act, 21 U  S C  194QSM-9(B)(7), unless the authorization is terminated  or revoked sooner  The test has been validated but independent review by FDA  and CLIA is pending  Test performed using [facility] GeneXpert: This RT-PCR assay targets N2,  a region unique to SARS-CoV-2  A conserved region in the E-gene was chosen  for pan-Sarbecovirus detection which includes SARS-CoV-2      HS Troponin I 4hr [[id number]]     Lab Status: No result Specimen: Blood     CBC and differential [[id number]]  (Abnormal) Collected: 09/03/22 1454    Lab Status: Final result Specimen: Blood from Arm, Left Updated: 09/03/22 1543     WBC 8 08 Thousand/uL      RBC 3 82 Million/uL      Hemoglobin 12 8 g/dL      Hematocrit 35 8 %      MCV 94 fL      MCH 33 5 pg      MCHC 35 8 g/dL      RDW 14 9 %      MPV 9 4 fL      Platelets 823 Thousands/uL      nRBC 0 /100 WBCs      Neutrophils Relative 82 %      Immat GRANS % 1 %      Lymphocytes Relative 12 %      Monocytes Relative 4 %      Eosinophils Relative 1 %      Basophils Relative 0 %      Neutrophils Absolute 6 57 Thousands/µL      Immature Grans Absolute 0 08 Thousand/uL      Lymphocytes Absolute 0 99 Thousands/µL Monocytes Absolute 0 34 Thousand/µL      Eosinophils Absolute 0 07 Thousand/µL      Basophils Absolute 0 03 Thousands/µL     HS Troponin 0hr (reflex protocol) [[id number]]  (Normal) Collected: 09/03/22 1454    Lab Status: Final result Specimen: Blood from Arm, Left Updated: 09/03/22 1542     hs TnI 0hr 13 ng/L     B-Type Natriuretic Peptide(BNP), AN, CA, EA Campuses Only [[id number]]  (Normal) Collected: 09/03/22 1454    Lab Status: Final result Specimen: Blood from Arm, Left Updated: 09/03/22 1541     BNP 27 pg/mL     Lactic acid [[id number]]  (Abnormal) Collected: 09/03/22 1454    Lab Status: Final result Specimen: Blood from Arm, Left Updated: 09/03/22 1539     LACTIC ACID 2 8 mmol/L     Narrative:      Result may be elevated if tourniquet was used during collection      Procalcitonin [[id number]]  (Normal) Collected: 09/03/22 1454    Lab Status: Final result Specimen: Blood from Arm, Left Updated: 09/03/22 1537     Procalcitonin 0 15 ng/ml     Comprehensive metabolic panel [[id number]]  (Abnormal) Collected: 09/03/22 1454    Lab Status: Final result Specimen: Blood from Arm, Left Updated: 09/03/22 1531     Sodium 137 mmol/L      Potassium 3 2 mmol/L      Chloride 97 mmol/L      CO2 30 mmol/L      ANION GAP 10 mmol/L      BUN 29 mg/dL      Creatinine 1 13 mg/dL      Glucose 142 mg/dL      Calcium 9 1 mg/dL      AST 21 U/L      ALT 32 U/L      Alkaline Phosphatase 61 U/L      Total Protein 6 5 g/dL      Albumin 4 1 g/dL      Total Bilirubin 1 33 mg/dL      eGFR 61 ml/min/1 73sq m     Narrative:      [place] guidelines for Chronic Kidney Disease (CKD):     Stage 1 with normal or high GFR (GFR > 90 mL/min/1 73 square meters)    Stage 2 Mild CKD (GFR = 60-89 mL/min/1 73 square meters)    Stage 3A Moderate CKD (GFR = 45-59 mL/min/1 73 square meters)    Stage 3B Moderate CKD (GFR = 30-44 mL/min/1 73 square meters)    Stage 4 Severe CKD (GFR = 15-29 mL/min/1 73 square meters)    Stage 5 End Stage CKD (GFR <15 mL/min/1 73 square meters)  Note: GFR calculation is accurate only with a steady state creatinine    Protime-INR [[id number]]  (Normal) Collected: 09/03/22 1454    Lab Status: Final result Specimen: Blood from Arm, Left Updated: 09/03/22 1522     Protime 13 0 seconds      INR 0 96    APTT [[id number]]  (Normal) Collected: 09/03/22 1454    Lab Status: Final result Specimen: Blood from Arm, Left Updated: 09/03/22 1522     PTT 26 seconds     Blood gas, venous [[id number]]  (Abnormal) Collected: 09/03/22 1454    Lab Status: Final result Specimen: Blood from Arm, Left Updated: 09/03/22 1515     pH, Ralph 7 442     pCO2, Ralph 39 3 mm Hg      pO2, Ralph 48 8 mm Hg      HCO3, Ralph 26 2 mmol/L      Base Excess, Ralph 2 1 mmol/L      O2 Content, Ralph 16 3 ml/dL      O2 HGB, VENOUS 84 8 %     Blood culture #2 [[id number]] Collected: 09/03/22 1454    Lab Status: In process Specimen: Blood from Arm, Left Updated: 09/03/22 1502    Blood culture #1 [[id number]] Collected: 09/03/22 1454    Lab Status: In process Specimen: Blood from Arm, Left Updated: 09/03/22 1502    UA w Reflex to Microscopic w Reflex to Culture [[id number]]     Lab Status: No result Specimen: Urine                  XR chest 1 view portable    (Results Pending)         Procedures  Procedures      ED Course                                       MDM  Number of Diagnoses or Management Options  COPD exacerbation ([facility] )  Fall  Diagnosis management comments: DDx including but not limited to: COPD exacerbation vs pneumonia, less likely CHF/ PE    Risk of Complications, Morbidity, and/or Mortality  General comments: Patient admitted with dyspnea, weakness and lightheadedness  On admission patient in distress, appears tachypnec and tachycardic   Patient with dyspnea after 2 words  On auscultation wheezing are presennt and decreased breathing sounds  Patient status post 1 dose of Solu-Medrol and DuoNeb with little improvement  Was initiated workup for sepsis    The lactic acid was noted to be elevated and appears to be a chronic issue  COVID test negative, CMP shows hypokalemia which was repleted  ProBNP normal   CBC with normal WBC, low platelets and RBC  VBG showed alkalosis with low pCO2 39 3 and slight high pO2 48 8  Chest x-ray did not show any acute cardiopulmonary abnormalities  Blood culture are still pending  Patient was administrated Atrovent and albuterol nebulizers with some improvement in the symptoms  Patient was admitted to [facility] surge service for obs  Disposition  Final diagnoses:   COPD exacerbation ([facility] )   Fall     Time reflects when diagnosis was documented in both MDM as applicable and the Disposition within this note     Time User Action Codes Description Comment    9/3/2022  5:33 PM Maitland Beery Add [J44 1] COPD exacerbation ([facility] )     9/3/2022  5:33 PM Omega Beery Add [G65  XXXA] Fall       ED Disposition     ED Disposition   Admit    Condition   Stable    Date/Time   Sat Sep 3, 2022  5:33 PM    Comment   Case was discussed with resident Ella Hanson and the patient's admission status was agreed to be Admission Status: observation status to the service of Dr Dr Alfonso Valdovinos    None         Patient's Medications   Discharge Prescriptions    No medications on file     No discharge procedures on file  PDMP Review       Value Time User    PDMP Reviewed  Yes 4/5/2022  9:56 AM Jaiden Ozuna MD           ED Provider  Attending physically available and evaluated Thai García I managed the patient along with the ED Attending      Electronically Signed by         Hammad Quintanilla MD  09/03/22 9131

## 2022-09-03 NOTE — ASSESSMENT & PLAN NOTE
Patient presented to the ED with tachycardia and tachypnea  No fever, no evidence of infection at this time  White blood cell normal, procalcitonin normal, blood cultures pending

## 2022-09-03 NOTE — ASSESSMENT & PLAN NOTE
· Chronically on 3 L oxygen secondary to centrilobular emphysema and alpha 1 antitrypsin deficiency  · Chest x-ray no evidence of cardiopulmonary disease  · Monitor oxygen requirements and keep SpO2 over 88%  · Currently at baseline 3 L NC  · Titrate for O2 for SpO2 80-94%  · Continue incentive spirometry  · Treat for COPD exacerbation  · Pulmonology consulted

## 2022-09-03 NOTE — ASSESSMENT & PLAN NOTE
On Zemaira infusion per pulmonology  Unable to get his infusions for the past 1 month because he was hospitalized and then had rehab

## 2022-09-03 NOTE — ASSESSMENT & PLAN NOTE
Patient returning to the hospital for another severe exacerbation of his COPD  Last admission 8/18 1 exacerbation of COPD and was on tapered dose of prednisone and required antibiotic azithromycin  Several readmissions in the past 6 months for COPD exacerbation  Workup for this admission shows no evidence of infection  WBC normal, broke all normal, chest x-ray no evidence of cardiopulmonary disease, COVID/flu/RSV negative    Plan    -IV Solu-Medrol 40 mg every 8 hours  · Atrovent/Xopenex q i d  · Continue performist and Pulmicort  · Respiratory protocol, airway clearance, antitussives and incentive spirometry  · Pulmonology consult considering recurrent admissions for COPD exacerbation in the last month    · Monitor oxygen requirements  · No antibiotic requirements at this time  · At this point with several readmissions for severe COPD exacerbation goals of care will be discussed with the patient and his family if they are agreeable  · Pulmonary rehab  · Palliative care will also be consulted

## 2022-09-03 NOTE — ASSESSMENT & PLAN NOTE
Per chart review, patient completed 6-month course of Eliquis per oncology in 2018    If patient develops another acute PE then will need lifelong AC  - No need for systemic anticoagulation at this time

## 2022-09-03 NOTE — PLAN OF CARE
Problem: MOBILITY - ADULT  Goal: Maintain or return to baseline ADL function  Description: INTERVENTIONS:  -  Assess patient's ability to carry out ADLs; assess patient's baseline for ADL function and identify physical deficits which impact ability to perform ADLs (bathing, care of mouth/teeth, toileting, grooming, dressing, etc )  - Assess/evaluate cause of self-care deficits   - Assess range of motion  - Assess patient's mobility; develop plan if impaired  - Assess patient's need for assistive devices and provide as appropriate  - Encourage maximum independence but intervene and supervise when necessary  - Involve family in performance of ADLs  - Assess for home care needs following discharge   - Consider OT consult to assist with ADL evaluation and planning for discharge  - Provide patient education as appropriate  Outcome: Progressing  Goal: Maintains/Returns to pre admission functional level  Description: INTERVENTIONS:  - Perform BMAT or MOVE assessment daily    - Set and communicate daily mobility goal to care team and patient/family/caregiver  - Collaborate with rehabilitation services on mobility goals if consulted  - Perform Range of Motion  times a day  - Reposition patient every  hours    - Dangle patient  times a day  - Stand patient  times a day  - Ambulate patient times a day  - Out of bed to chair  times a day   - Out of bed for meals times a day  - Out of bed for toileting  - Record patient progress and toleration of activity level   Outcome: Progressing     Problem: Potential for Falls  Goal: Patient will remain free of falls  Description: INTERVENTIONS:  - Educate patient/family on patient safety including physical limitations  - Instruct patient to call for assistance with activity   - Consult OT/PT to assist with strengthening/mobility   - Keep Call bell within reach  - Keep bed low and locked with side rails adjusted as appropriate  - Keep care items and personal belongings within reach  - Initiate and maintain comfort rounds  - Make Fall Risk Sign visible to staff  - Offer Toileting every  Hours, in advance of need  - Initiate/Maintain alarm  - Obtain necessary fall risk management equipment:   - Apply yellow socks and bracelet for high fall risk patients  - Consider moving patient to room near nurses station  Outcome: Progressing     Problem: Prexisting or High Potential for Compromised Skin Integrity  Goal: Skin integrity is maintained or improved  Description: INTERVENTIONS:  - Identify patients at risk for skin breakdown  - Assess and monitor skin integrity  - Assess and monitor nutrition and hydration status  - Monitor labs   - Assess for incontinence   - Turn and reposition patient  - Assist with mobility/ambulation  - Relieve pressure over bony prominences  - Avoid friction and shearing  - Provide appropriate hygiene as needed including keeping skin clean and dry  - Evaluate need for skin moisturizer/barrier cream  - Collaborate with interdisciplinary team   - Patient/family teaching  - Consider wound care consult   Outcome: Progressing

## 2022-09-03 NOTE — ED ATTENDING ATTESTATION
9/3/2022  IMaranda MD, saw and evaluated the patient  I have discussed the patient with the resident/non-physician practitioner and agree with the resident's/non-physician practitioner's findings, Plan of Care, and MDM as documented in the resident's/non-physician practitioner's note, except where noted  All available labs and Radiology studies were reviewed  I was present for key portions of any procedure(s) performed by the resident/non-physician practitioner and I was immediately available to provide assistance  At this point I agree with the current assessment done in the Emergency Department  I have conducted an independent evaluation of this patient a history and physical is as follows:  Patient fell at home, was unable to stand up  EMS was initially called for lift assist, patient with significant respiratory distress upon their arrival, brought to the emergency department for further evaluation  Patient reports worsening symptoms  Denies any chest pain  States he is having significant difficulty at home, unable to ambulate short distances to perform normal activities including cooking food, eating etc  Denies any injuries in the fall  Review of Systems - Negative except shortness of breath, weakness  Physical Exam  Vitals and nursing note reviewed  Constitutional:       General: He is not in acute distress  Appearance: He is well-developed  He is ill-appearing  He is not diaphoretic  HENT:      Head: Normocephalic and atraumatic  Right Ear: External ear normal       Left Ear: External ear normal    Eyes:      General:         Right eye: No discharge  Left eye: No discharge  Pupils: Pupils are equal, round, and reactive to light  Neck:      Thyroid: No thyromegaly  Trachea: No tracheal deviation  Cardiovascular:      Rate and Rhythm: Normal rate and regular rhythm  Heart sounds: No murmur heard    Pulmonary:      Effort: Pulmonary effort is normal       Breath sounds: Examination of the right-upper field reveals wheezing  Examination of the left-upper field reveals wheezing  Examination of the right-middle field reveals wheezing  Examination of the left-middle field reveals wheezing  Examination of the right-lower field reveals wheezing  Examination of the left-lower field reveals wheezing  Wheezing present  Abdominal:      General: Bowel sounds are normal  There is no distension  Palpations: Abdomen is soft  Tenderness: There is no abdominal tenderness  Musculoskeletal:         General: No deformity  Normal range of motion  Cervical back: Normal range of motion and neck supple  Skin:     General: Skin is warm  Capillary Refill: Capillary refill takes less than 2 seconds  Neurological:      Mental Status: He is alert and oriented to person, place, and time  Cranial Nerves: No cranial nerve deficit  Motor: No abnormal muscle tone     Psychiatric:         Behavior: Behavior normal              ED Course         Critical Care Time  ECG 12 Lead Documentation Only    Date/Time: 9/3/2022 3:18 PM  Performed by: Win Candelaria MD  Authorized by: Win Candelaria MD     Indications / Diagnosis:  Shortness of breath  ECG reviewed by me, the ED Provider: yes    Patient location:  ED  Rate:     ECG rate:  83    ECG rate assessment: normal    Rhythm:     Rhythm: sinus rhythm    Ectopy:     Ectopy: none    QRS:     QRS axis:  Normal    QRS intervals:  Normal  Conduction:     Conduction: normal    T waves:     T waves: normal    CriticalCare Time  Performed by: Win Candelaria MD  Authorized by: Win Candelaria MD     Critical care provider statement:     Critical care time (minutes):  50    Critical care time was exclusive of:  Separately billable procedures and treating other patients and teaching time    Critical care was necessary to treat or prevent imminent or life-threatening deterioration of the following conditions:  Respiratory failure    Critical care was time spent personally by me on the following activities:  Evaluation of patient's response to treatment, examination of patient, ordering and review of radiographic studies and ordering and review of laboratory studies  Comments:      Significant conversational dyspnea, wheezing, tachypnea, COPD exacerbation requiring continuous albuterol treatment with GONZALEZ nebulizer  Results Reviewed     Procedure Component Value Units Date/Time    Lactic acid 2 Hours [[id number]] Collected: 09/03/22 1658    Lab Status: In process Specimen: Blood from Arm, Left Updated: 09/03/22 1704    HS Troponin I 2hr [[id number]] Collected: 09/03/22 1658    Lab Status: In process Specimen: Blood from Arm, Left Updated: 09/03/22 1704    FLU/RSV/COVID - if FLU/RSV clinically relevant [[id number]]  (Normal) Collected: 09/03/22 1618    Lab Status: Final result Specimen: Nares from Nasopharyngeal Swab Updated: 09/03/22 1703     SARS-CoV-2 Negative     INFLUENZA A PCR Negative     INFLUENZA B PCR Negative     RSV PCR Negative    Narrative:      FOR PEDIATRIC PATIENTS - copy/paste COVID Guidelines URL to browser: [email or web address]    SARS-CoV-2 assay is a Nucleic Acid Amplification assay intended for the  qualitative detection of nucleic acid from SARS-CoV-2 in nasopharyngeal  swabs  Results are for the presumptive identification of SARS-CoV-2 RNA  Positive results are indicative of infection with SARS-CoV-2, the virus  causing COVID-19, but do not rule out bacterial infection or co-infection  with other viruses  Laboratories within the [place] and its  territories are required to report all positive results to the appropriate  public health authorities  Negative results do not preclude SARS-CoV-2  infection and should not be used as the sole basis for treatment or other  patient management decisions   Negative results must be combined with  clinical observations, patient history, and epidemiological information  This test has not been FDA cleared or approved  This test has been authorized by FDA under an Emergency Use Authorization  (EUA)  This test is only authorized for the duration of time the  declaration that circumstances exist justifying the authorization of the  emergency use of an in vitro diagnostic tests for detection of SARS-CoV-2  virus and/or diagnosis of COVID-19 infection under section 564(b)(1) of  the Act, 21 U  S C  116MPK-8(Q)(3), unless the authorization is terminated  or revoked sooner  The test has been validated but independent review by FDA  and CLIA is pending  Test performed using [employer] GeneXpert: This RT-PCR assay targets N2,  a region unique to SARS-CoV-2  A conserved region in the E-gene was chosen  for pan-Sarbecovirus detection which includes SARS-CoV-2      HS Troponin I 4hr [[id number]]     Lab Status: No result Specimen: Blood     CBC and differential [[id number]]  (Abnormal) Collected: 09/03/22 1454    Lab Status: Final result Specimen: Blood from Arm, Left Updated: 09/03/22 1543     WBC 8 08 Thousand/uL      RBC 3 82 Million/uL      Hemoglobin 12 8 g/dL      Hematocrit 35 8 %      MCV 94 fL      MCH 33 5 pg      MCHC 35 8 g/dL      RDW 14 9 %      MPV 9 4 fL      Platelets 473 Thousands/uL      nRBC 0 /100 WBCs      Neutrophils Relative 82 %      Immat GRANS % 1 %      Lymphocytes Relative 12 %      Monocytes Relative 4 %      Eosinophils Relative 1 %      Basophils Relative 0 %      Neutrophils Absolute 6 57 Thousands/µL      Immature Grans Absolute 0 08 Thousand/uL      Lymphocytes Absolute 0 99 Thousands/µL      Monocytes Absolute 0 34 Thousand/µL      Eosinophils Absolute 0 07 Thousand/µL      Basophils Absolute 0 03 Thousands/µL     HS Troponin 0hr (reflex protocol) [[id number]]  (Normal) Collected: 09/03/22 1454    Lab Status: Final result Specimen: Blood from Arm, Left Updated: 09/03/22 1542     hs TnI 0hr 13 ng/L     B-Type Natriuretic Peptide(BNP), AN, CA, EA Campuses Only [[id number]]  (Normal) Collected: 09/03/22 1454    Lab Status: Final result Specimen: Blood from Arm, Left Updated: 09/03/22 1541     BNP 27 pg/mL     Lactic acid [[id number]]  (Abnormal) Collected: 09/03/22 1454    Lab Status: Final result Specimen: Blood from Arm, Left Updated: 09/03/22 1539     LACTIC ACID 2 8 mmol/L     Narrative:      Result may be elevated if tourniquet was used during collection      Procalcitonin [[id number]]  (Normal) Collected: 09/03/22 1454    Lab Status: Final result Specimen: Blood from Arm, Left Updated: 09/03/22 1537     Procalcitonin 0 15 ng/ml     Comprehensive metabolic panel [[id number]]  (Abnormal) Collected: 09/03/22 1454    Lab Status: Final result Specimen: Blood from Arm, Left Updated: 09/03/22 1531     Sodium 137 mmol/L      Potassium 3 2 mmol/L      Chloride 97 mmol/L      CO2 30 mmol/L      ANION GAP 10 mmol/L      BUN 29 mg/dL      Creatinine 1 13 mg/dL      Glucose 142 mg/dL      Calcium 9 1 mg/dL      AST 21 U/L      ALT 32 U/L      Alkaline Phosphatase 61 U/L      Total Protein 6 5 g/dL      Albumin 4 1 g/dL      Total Bilirubin 1 33 mg/dL      eGFR 61 ml/min/1 73sq m     Narrative:      [place] guidelines for Chronic Kidney Disease (CKD):     Stage 1 with normal or high GFR (GFR > 90 mL/min/1 73 square meters)    Stage 2 Mild CKD (GFR = 60-89 mL/min/1 73 square meters)    Stage 3A Moderate CKD (GFR = 45-59 mL/min/1 73 square meters)    Stage 3B Moderate CKD (GFR = 30-44 mL/min/1 73 square meters)    Stage 4 Severe CKD (GFR = 15-29 mL/min/1 73 square meters)    Stage 5 End Stage CKD (GFR <15 mL/min/1 73 square meters)  Note: GFR calculation is accurate only with a steady state creatinine    Protime-INR [[id number]]  (Normal) Collected: 09/03/22 1454    Lab Status: Final result Specimen: Blood from Arm, Left Updated: 09/03/22 0638 Protime 13 0 seconds      INR 0 96    APTT [[id number]]  (Normal) Collected: 09/03/22 1454    Lab Status: Final result Specimen: Blood from Arm, Left Updated: 09/03/22 1522     PTT 26 seconds     Blood gas, venous [[id number]]  (Abnormal) Collected: 09/03/22 1454    Lab Status: Final result Specimen: Blood from Arm, Left Updated: 09/03/22 1515     pH, Ralph 7 442     pCO2, Ralph 39 3 mm Hg      pO2, Ralph 48 8 mm Hg      HCO3, Ralph 26 2 mmol/L      Base Excess, Ralph 2 1 mmol/L      O2 Content, Ralph 16 3 ml/dL      O2 HGB, VENOUS 84 8 %     Blood culture #2 [[id number]] Collected: 09/03/22 1454    Lab Status: In process Specimen: Blood from Arm, Left Updated: 09/03/22 1502    Blood culture #1 [[id number]] Collected: 09/03/22 1454    Lab Status:  In process Specimen: Blood from Arm, Left Updated: 09/03/22 1502    UA w Reflex to Microscopic w Reflex to Culture [[id number]]     Lab Status: No result Specimen: Urine         XR chest 1 view portable    (Results Pending)

## 2022-09-03 NOTE — ASSESSMENT & PLAN NOTE
Multifactorial, age, Parkinson's disease, peripheral neuropathy, chronic medical conditions  - fall precautions  - OT/PT/RT  - optimize chronic medical conditions

## 2022-09-04 LAB
ANION GAP SERPL CALCULATED.3IONS-SCNC: 12 MMOL/L (ref 4–13)
BILIRUB UR QL STRIP: NEGATIVE
BUN SERPL-MCNC: 23 MG/DL (ref 5–25)
CALCIUM SERPL-MCNC: 8.7 MG/DL (ref 8.4–10.2)
CHLORIDE SERPL-SCNC: 101 MMOL/L (ref 96–108)
CLARITY UR: CLEAR
CO2 SERPL-SCNC: 25 MMOL/L (ref 21–32)
COLOR UR: YELLOW
CREAT SERPL-MCNC: 0.96 MG/DL (ref 0.6–1.3)
ERYTHROCYTE [DISTWIDTH] IN BLOOD BY AUTOMATED COUNT: 14.5 % (ref 11.6–15.1)
GFR SERPL CREATININE-BSD FRML MDRD: 75 ML/MIN/1.73SQ M
GLUCOSE SERPL-MCNC: 154 MG/DL (ref 65–140)
GLUCOSE UR STRIP-MCNC: ABNORMAL MG/DL
HCT VFR BLD AUTO: 32.1 % (ref 36.5–49.3)
HGB BLD-MCNC: 11.7 G/DL (ref 12–17)
HGB UR QL STRIP.AUTO: NEGATIVE
INR PPP: 1.03 (ref 0.84–1.19)
KETONES UR STRIP-MCNC: ABNORMAL MG/DL
LACTATE SERPL-SCNC: 5.1 MMOL/L (ref 0.5–2)
LACTATE SERPL-SCNC: 5.7 MMOL/L (ref 0.5–2)
LEUKOCYTE ESTERASE UR QL STRIP: NEGATIVE
MCH RBC QN AUTO: 33.2 PG (ref 26.8–34.3)
MCHC RBC AUTO-ENTMCNC: 36.4 G/DL (ref 31.4–37.4)
MCV RBC AUTO: 91 FL (ref 82–98)
NITRITE UR QL STRIP: NEGATIVE
PH UR STRIP.AUTO: 5.5 [PH]
PLATELET # BLD AUTO: 124 THOUSANDS/UL (ref 149–390)
PLATELET # BLD AUTO: 98 THOUSANDS/UL (ref 149–390)
PMV BLD AUTO: 10 FL (ref 8.9–12.7)
PMV BLD AUTO: 10.3 FL (ref 8.9–12.7)
POTASSIUM SERPL-SCNC: 3.8 MMOL/L (ref 3.5–5.3)
PROCALCITONIN SERPL-MCNC: 0.13 NG/ML
PROT UR STRIP-MCNC: NEGATIVE MG/DL
PROTHROMBIN TIME: 13.7 SECONDS (ref 11.6–14.5)
RBC # BLD AUTO: 3.52 MILLION/UL (ref 3.88–5.62)
SODIUM SERPL-SCNC: 138 MMOL/L (ref 135–147)
SP GR UR STRIP.AUTO: 1.02 (ref 1–1.03)
UROBILINOGEN UR STRIP-ACNC: <2 MG/DL
WBC # BLD AUTO: 7.79 THOUSAND/UL (ref 4.31–10.16)

## 2022-09-04 PROCEDURE — 85027 COMPLETE CBC AUTOMATED: CPT

## 2022-09-04 PROCEDURE — 84145 PROCALCITONIN (PCT): CPT

## 2022-09-04 PROCEDURE — 94760 N-INVAS EAR/PLS OXIMETRY 1: CPT

## 2022-09-04 PROCEDURE — 85610 PROTHROMBIN TIME: CPT

## 2022-09-04 PROCEDURE — 83605 ASSAY OF LACTIC ACID: CPT | Performed by: HOSPITALIST

## 2022-09-04 PROCEDURE — 99232 SBSQ HOSP IP/OBS MODERATE 35: CPT | Performed by: HOSPITALIST

## 2022-09-04 PROCEDURE — 99232 SBSQ HOSP IP/OBS MODERATE 35: CPT | Performed by: INTERNAL MEDICINE

## 2022-09-04 PROCEDURE — 94762 N-INVAS EAR/PLS OXIMTRY CONT: CPT

## 2022-09-04 PROCEDURE — 99497 ADVNCD CARE PLAN 30 MIN: CPT | Performed by: HOSPITALIST

## 2022-09-04 PROCEDURE — 80048 BASIC METABOLIC PNL TOTAL CA: CPT

## 2022-09-04 PROCEDURE — 94640 AIRWAY INHALATION TREATMENT: CPT

## 2022-09-04 PROCEDURE — 85049 AUTOMATED PLATELET COUNT: CPT

## 2022-09-04 RX ORDER — SODIUM CHLORIDE 9 MG/ML
50 INJECTION, SOLUTION INTRAVENOUS CONTINUOUS
Status: DISCONTINUED | OUTPATIENT
Start: 2022-09-04 | End: 2022-09-05

## 2022-09-04 RX ADMIN — BUSPIRONE HYDROCHLORIDE 10 MG: 5 TABLET ORAL at 16:17

## 2022-09-04 RX ADMIN — ROFLUMILAST 250 MCG: 250 TABLET ORAL at 09:59

## 2022-09-04 RX ADMIN — IPRATROPIUM BROMIDE 0.5 MG: 0.5 SOLUTION RESPIRATORY (INHALATION) at 13:49

## 2022-09-04 RX ADMIN — GABAPENTIN 300 MG: 300 CAPSULE ORAL at 21:11

## 2022-09-04 RX ADMIN — LEVALBUTEROL HYDROCHLORIDE 1.25 MG: 1.25 SOLUTION, CONCENTRATE RESPIRATORY (INHALATION) at 20:05

## 2022-09-04 RX ADMIN — IPRATROPIUM BROMIDE 0.5 MG: 0.5 SOLUTION RESPIRATORY (INHALATION) at 20:05

## 2022-09-04 RX ADMIN — AMLODIPINE BESYLATE 10 MG: 10 TABLET ORAL at 09:58

## 2022-09-04 RX ADMIN — BUSPIRONE HYDROCHLORIDE 10 MG: 5 TABLET ORAL at 21:11

## 2022-09-04 RX ADMIN — FORMOTEROL FUMARATE DIHYDRATE 20 MCG: 20 SOLUTION RESPIRATORY (INHALATION) at 20:05

## 2022-09-04 RX ADMIN — METHYLPREDNISOLONE SODIUM SUCCINATE 40 MG: 40 INJECTION, POWDER, FOR SOLUTION INTRAMUSCULAR; INTRAVENOUS at 10:00

## 2022-09-04 RX ADMIN — BUDESONIDE 0.5 MG: 0.5 INHALANT ORAL at 20:05

## 2022-09-04 RX ADMIN — TAMSULOSIN HYDROCHLORIDE 0.4 MG: 0.4 CAPSULE ORAL at 09:58

## 2022-09-04 RX ADMIN — FORMOTEROL FUMARATE DIHYDRATE 20 MCG: 20 SOLUTION RESPIRATORY (INHALATION) at 08:37

## 2022-09-04 RX ADMIN — ALBUTEROL SULFATE 2.5 MG: 2.5 SOLUTION RESPIRATORY (INHALATION) at 10:21

## 2022-09-04 RX ADMIN — MIRTAZAPINE 30 MG: 15 TABLET, FILM COATED ORAL at 21:11

## 2022-09-04 RX ADMIN — IPRATROPIUM BROMIDE 0.5 MG: 0.5 SOLUTION RESPIRATORY (INHALATION) at 08:23

## 2022-09-04 RX ADMIN — LEVALBUTEROL HYDROCHLORIDE 1.25 MG: 1.25 SOLUTION, CONCENTRATE RESPIRATORY (INHALATION) at 08:23

## 2022-09-04 RX ADMIN — METHYLPREDNISOLONE SODIUM SUCCINATE 40 MG: 40 INJECTION, POWDER, FOR SOLUTION INTRAMUSCULAR; INTRAVENOUS at 02:30

## 2022-09-04 RX ADMIN — SODIUM CHLORIDE 75 ML/HR: 0.9 INJECTION, SOLUTION INTRAVENOUS at 15:40

## 2022-09-04 RX ADMIN — ENOXAPARIN SODIUM 40 MG: 40 INJECTION SUBCUTANEOUS at 09:58

## 2022-09-04 RX ADMIN — BUDESONIDE 0.5 MG: 0.5 INHALANT ORAL at 08:23

## 2022-09-04 RX ADMIN — METHYLPREDNISOLONE SODIUM SUCCINATE 40 MG: 40 INJECTION, POWDER, FOR SOLUTION INTRAMUSCULAR; INTRAVENOUS at 18:19

## 2022-09-04 RX ADMIN — LEVALBUTEROL HYDROCHLORIDE 1.25 MG: 1.25 SOLUTION, CONCENTRATE RESPIRATORY (INHALATION) at 13:49

## 2022-09-04 RX ADMIN — GABAPENTIN 300 MG: 300 CAPSULE ORAL at 09:58

## 2022-09-04 RX ADMIN — GABAPENTIN 300 MG: 300 CAPSULE ORAL at 16:17

## 2022-09-04 RX ADMIN — BUSPIRONE HYDROCHLORIDE 10 MG: 5 TABLET ORAL at 09:58

## 2022-09-04 RX ADMIN — FINASTERIDE 5 MG: 5 TABLET, FILM COATED ORAL at 09:58

## 2022-09-04 RX ADMIN — ROFLUMILAST 250 MCG: 250 TABLET ORAL at 14:28

## 2022-09-04 RX ADMIN — CHLORTHALIDONE 25 MG: 25 TABLET ORAL at 09:59

## 2022-09-04 NOTE — ASSESSMENT & PLAN NOTE
· On Zemaira infusion per pulmonology  · Unable to get his infusions for the past 1 month because he was hospitalized and then had rehab

## 2022-09-04 NOTE — ASSESSMENT & PLAN NOTE
· Patient presented to the ED with tachycardia and tachypnea  · No fever, no evidence of infection at this time  · White blood cell normal, procalcitonin normal, blood cultures pending

## 2022-09-04 NOTE — ASSESSMENT & PLAN NOTE
· Multifactorial due to age, Parkinson's disease, peripheral neuropathy, chronic medical conditions   · Recently discharged from rehab, lives at home by himself, uses a walker  · Fall precautions  · PT/OT

## 2022-09-04 NOTE — ASSESSMENT & PLAN NOTE
· Low suspicion for sepsis  · 2/2 increased respiratory muscle use, deconditioning  · Received fluids overnight  · Trend levels until normal

## 2022-09-04 NOTE — CONSULTS
Pulmonary Consultation   Kim Del Angel 65 y o  male MRN: [id number]  Unit/Bed#: S -01 Encounter: [id number]      Reason for consultation:  COPD, alpha one anti trypsin deficiency    Requesting physician:  Dr Maribell Carson    Impressions/Recommendations:     · A1AT deficiency - because of the patient's frequent hospital admissions, he has not been available to receive his alpha-1     · COPD - frequent exacerbations  Try Daliresp 500 mg daily  Prognosis guarded  Severe WALKER  On oxygen at 3 lpm at home  Chief Complaint: "short of breath"    History of Present Illness   HPI:  Kim Del Angel is a 65 y o  male who fell on the floor at home and called the police  They in turn felt that the patient needed to go to the hospital  He has been admitted frequently for COPD exacerbation  He has not tried [employer]  Denies cough or sinus congestion  No GERD  His most recent A1AT level was 97 in 2018  Review of systems: No additional complaints  Review of Systems        Historical Information   Past Medical History:   Diagnosis Date    Anesthesia complication     Difficult to wake up    Arthritis     BPH (benign prostatic hyperplasia)     urinary frequency    Cancer (HCC)     basal cell neck, face    CKD (chronic kidney disease) stage 2, GFR 60-89 ml/min 07/04/2022    COPD (chronic obstructive pulmonary disease) (HCC)     COPD exacerbation ([facility] [address] ) 12/29/2019    Coronary artery disease     Elevated PSA 10/25/2018    Full dentures     Hiatal hernia     History of methicillin resistant staphylococcus aureus (MRSA)     10/11/2018 MRSA (nares) positive    Hypertension     Irritable bowel syndrome     Kidney stone     at least 7 episodes    Liver disease     Alpha 1- enzyme deficiency - diagnosed 2002   has been on weekly replacement therapy since then    Parkinson disease ([facility] [address] )     Pulmonary emphysema ([facility] [address] )     1/25/15  FEV1 - 2 45 liters or 59% of predicted    RSV infection 12/2017    SIRS (systemic inflammatory response syndrome) ([facility] [address] ) 8/14/2022    Wears glasses     for driving only     Past Surgical History:   Procedure Laterality Date    BACK SURGERY  2008    discectomy    CARDIAC CATHETERIZATION N/A 5/3/2022    Procedure: Cardiac catheterization both left and right heart catheterization with vaso dilatory trial;  Surgeon: Gretel Marquez MD;  Location: [place] [address] CATH LAB; Service: Cardiology    CARDIAC CATHETERIZATION Left 5/3/2022    Procedure: Cardiac Left Heart Cath;  Surgeon: Gretel Marquez MD;  Location: [place] [address] CATH LAB; Service: Cardiology    CARDIAC CATHETERIZATION Left 5/3/2022    Procedure: Cardiac Left Ventriculogram;  Surgeon: Gretel Marquez MD;  Location: [place] [address] CATH LAB; Service: Cardiology    COLONOSCOPY      COLONOSCOPY N/A 3/10/2017    Procedure: [place];  Surgeon: Poli Sage MD;  Location: [facility] GI LAB; Service:    [place]      removal of kidney stones    ESOPHAGOGASTRODUODENOSCOPY N/A 3/10/2017    Procedure: ESOPHAGOGASTRODUODENOSCOPY (EGD); Surgeon: Poli Sage MD;  Location: [facility] GI LAB; Service:     LITHOTRIPSY      ND ESOPHAGOGASTRODUODENOSCOPY TRANSORAL DIAGNOSTIC N/A 11/20/2018    Procedure: ESOPHAGOGASTRODUODENOSCOPY (EGD); Surgeon: Poli Sage MD;  Location: [facility] GI LAB;   Service: Gastroenterology    TONSILLECTOMY      VEIN LIGATION AND STRIPPING Bilateral     17's     Family History   Problem Relation Age of Onset    Emphysema Mother         never smoked    Emphysema Father     Cancer Brother         colon    Colon cancer Brother     Ulcerative colitis Family     Liver disease Family        Tobacco history: quit 6 years ago    Family history: n/a    Meds/Allergies   Current Facility-Administered Medications   Medication Dose Route Frequency    albuterol inhalation solution 2 5 mg  2 5 mg Nebulization Q4H PRN    amLODIPine (NORVASC) tablet 10 mg  10 mg Oral Daily    budesonide (PULMICORT) inhalation solution 0 5 mg  0 5 mg Nebulization Q12H    busPIRone (BUSPAR) tablet 10 mg  10 mg Oral TID    chlorthalidone tablet 25 mg  25 mg Oral Daily    enoxaparin (LOVENOX) subcutaneous injection 40 mg  40 mg Subcutaneous Daily    finasteride (PROSCAR) tablet 5 mg  5 mg Oral QAM    formoterol (PERFOROMIST) nebulizer solution 20 mcg  20 mcg Nebulization Q12H    gabapentin (NEURONTIN) capsule 300 mg  300 mg Oral TID    ipratropium (ATROVENT) 0 02 % inhalation solution 0 5 mg  0 5 mg Nebulization TID    levalbuterol (XOPENEX) inhalation solution 1 25 mg  1 25 mg Nebulization TID    methylPREDNISolone sodium succinate (Solu-MEDROL) injection 40 mg  40 mg Intravenous Q8H    mirtazapine (REMERON) tablet 30 mg  30 mg Oral HS    roflumilast (DALIRESP) tablet 250 mcg  250 mcg Oral Daily    tamsulosin (FLOMAX) capsule 0 4 mg  0 4 mg Oral Daily     Allergies   Allergen Reactions    Chantix [Varenicline]      Caused prostate infection       Vitals: Blood pressure 149/81, pulse 86, temperature 97 9 °F (36 6 °C), temperature source Oral, resp  rate 18, SpO2 96 % , , There is no height or weight on file to calculate BMI  Intake/Output Summary (Last 24 hours) at 9/4/2022 1224  Last data filed at 9/4/2022 1011  Gross per 24 hour   Intake --   Output 350 ml   Net -350 ml       Physical exam:   HEENT:  Flushed, N/C/AT  Lungs:  Bilateral coarse wheezing with markedly decreased air entry  Heart:  Tachycardic with diminished heart sounds    Psych:  Anxious    Physical Exam    Labs: ABG: No results found for: PHART, GBF1BZB, PO2ART, QEF7QUO, T7AYKGDG, BEART, SOURCE    Results from last 7 days   Lab Units 09/04/22  0426 09/03/22  1454   WBC Thousand/uL 7 79 8 08   HEMOGLOBIN g/dL 11 7* 12 8   HEMATOCRIT % 32 1* 35 8*   PLATELETS Thousands/uL 98* 113*         Results from last 7 days   Lab Units 09/04/22  0426 09/03/22  1454   POTASSIUM mmol/L 3 8 3 2*   CHLORIDE mmol/L 101 97   CO2 mmol/L 25 30   BUN mg/dL 23 29*   CREATININE mg/dL 0 96 1 13   CALCIUM mg/dL 8 7 9 1   ALK PHOS U/L  --  61   ALT U/L  --  32   AST U/L  --  21     Results from last 7 days   Lab Units 09/04/22  0426 09/03/22  1454   INR  1 03 0 96   PTT seconds  --  26           NT-BNP     Imaging and other studies: I have personally reviewed pertinent films in PACS    Pulmonary function testing: moderate obstruction by report      Code Status: Level 1 - Full Code    Thank you for allowing us to participate in the care of your patient      Naina Deleon MD

## 2022-09-04 NOTE — ASSESSMENT & PLAN NOTE
· Chronically on 3 L oxygen secondary to centrilobular emphysema and alpha 1 antitrypsin deficiency  · Chest x-ray with no evidence of cardiopulmonary disease  · Monitor oxygen requirements and keep SpO2 over 88%  · Currently on 3-4 L NC  · Continue incentive spirometry  · Treatment for COPD exacerbation as above  · Pulmonology on board

## 2022-09-04 NOTE — ASSESSMENT & PLAN NOTE
Patient returning to the hospital for another severe exacerbation of his COPD  Follows with [facility]  Last admission 8/18 for exacerbation of COPD and was on tapered dose of prednisone and required azithromycin  Several readmissions in the past 6 months for COPD exacerbation  Workup for this admission shows no evidence of infection  WBC normal, chest x-ray no evidence of cardiopulmonary disease, COVID/flu/RSV negative    Plan  · Continue IV Solu-Medrol 40 mg every 8 hours  · Atrovent/Xopenex q i d  · Continue performist and Pulmicort  · Respiratory protocol, airway clearance, antitussives and incentive spirometry    · Appreciate Pulmonology recommendations  · Daliresp 500 mg daily   · Procal negative x2, No antibiotic requirements at this time  · Pulmonary rehab  · Palliative care consulted; [address] discussion given severe COPD with recurrent admissions

## 2022-09-04 NOTE — PLAN OF CARE
Problem: MOBILITY - ADULT  Goal: Maintain or return to baseline ADL function  Description: INTERVENTIONS:  -  Assess patient's ability to carry out ADLs; assess patient's baseline for ADL function and identify physical deficits which impact ability to perform ADLs (bathing, care of mouth/teeth, toileting, grooming, dressing, etc )  - Assess/evaluate cause of self-care deficits   - Assess range of motion  - Assess patient's mobility; develop plan if impaired  - Assess patient's need for assistive devices and provide as appropriate  - Encourage maximum independence but intervene and supervise when necessary  - Involve family in performance of ADLs  - Assess for home care needs following discharge   - Consider OT consult to assist with ADL evaluation and planning for discharge  - Provide patient education as appropriate  Outcome: Progressing  Goal: Maintains/Returns to pre admission functional level  Description: INTERVENTIONS:  - Perform BMAT or MOVE assessment daily    - Set and communicate daily mobility goal to care team and patient/family/caregiver     - Collaborate with rehabilitation services on mobility goals if consulted  - Perform Range of Motion   - Reposition patient  - Dangle patient   - Stand patient   - Ambulate patient   - Out of bed to chair   - Out of bed for meals   - Out of bed for toileting  - Record patient progress and toleration of activity level   Outcome: Progressing     Problem: Potential for Falls  Goal: Patient will remain free of falls  Description: INTERVENTIONS:  - Educate patient/family on patient safety including physical limitations  - Instruct patient to call for assistance with activity   - Consult OT/PT to assist with strengthening/mobility   - Keep Call bell within reach  - Keep bed low and locked with side rails adjusted as appropriate  - Keep care items and personal belongings within reach  - Initiate and maintain comfort rounds  - Apply yellow socks and bracelet for high fall risk patients  - Consider moving patient to room near nurses station  Outcome: Progressing     Problem: Prexisting or High Potential for Compromised Skin Integrity  Goal: Skin integrity is maintained or improved  Description: INTERVENTIONS:  - Identify patients at risk for skin breakdown  - Assess and monitor skin integrity  - Assess and monitor nutrition and hydration status  - Monitor labs   - Assess for incontinence   - Turn and reposition patient  - Assist with mobility/ambulation  - Relieve pressure over bony prominences  - Avoid friction and shearing  - Provide appropriate hygiene as needed including keeping skin clean and dry  - Evaluate need for skin moisturizer/barrier cream  - Collaborate with interdisciplinary team   - Patient/family teaching  - Consider wound care consult   Outcome: Progressing     Problem: RESPIRATORY - ADULT  Goal: Achieves optimal ventilation and oxygenation  Description: INTERVENTIONS:  - Assess for changes in respiratory status  - Assess for changes in mentation and behavior  - Position to facilitate oxygenation and minimize respiratory effort  - Oxygen administered by appropriate delivery if ordered  - Initiate smoking cessation education as indicated  - Encourage broncho-pulmonary hygiene including cough, deep breathe, Incentive Spirometry  - Assess the need for suctioning and aspirate as needed  - Assess and instruct to report SOB or any respiratory difficulty  - Respiratory Therapy support as indicated  Outcome: Progressing

## 2022-09-04 NOTE — PROGRESS NOTES
[facility]  Progress Note - Crissy Oscar 1945, 65 y o  male MRN: [id number]  Unit/Bed#: S -01 Encounter: [id number]  Primary Care Provider: Mark Bah MD   Date and time admitted to hospital: 9/3/2022  1:55 PM    * COPD with acute exacerbation [facility])  Assessment & Plan  Patient returning to the hospital for another severe exacerbation of his COPD  Follows with [facility]  Last admission 8/18 for exacerbation of COPD and was on tapered dose of prednisone and required azithromycin  Several readmissions in the past 6 months for COPD exacerbation  Workup for this admission shows no evidence of infection  WBC normal, chest x-ray no evidence of cardiopulmonary disease, COVID/flu/RSV negative    Plan  · Continue IV Solu-Medrol 40 mg every 8 hours  · Atrovent/Xopenex q i d  · Continue performist and Pulmicort  · Respiratory protocol, airway clearance, antitussives and incentive spirometry    · Appreciate Pulmonology recommendations  · Daliresp 500 mg daily   · Procal negative x2, No antibiotic requirements at this time  · Pulmonary rehab  · Palliative care consulted; Saurabh 63 discussion given severe COPD with recurrent admissions      [facility]-antitrypsin deficiency ([facility] )  Assessment & Plan  · On Zemaira infusion per pulmonology  · Unable to get his infusions for the past 1 month because he was hospitalized and then had rehab       Acute on chronic respiratory failure ([facility] )  Assessment & Plan    · Chronically on 3 L oxygen secondary to centrilobular emphysema and alpha 1 antitrypsin deficiency  · Chest x-ray with no evidence of cardiopulmonary disease  · Monitor oxygen requirements and keep SpO2 over 88%  · Currently on 3-4 L NC  · Continue incentive spirometry  · Treatment for COPD exacerbation as above  · Pulmonology on board      SIRS (systemic inflammatory response syndrome) ([facility] )  Assessment & Plan  · Patient presented to the ED with tachycardia and tachypnea  · No fever, no evidence of infection at this time  · White blood cell normal, procalcitonin normal, blood cultures pending    Lactic acidosis  Assessment & Plan  · Low suspicion for sepsis  · 2/2 increased respiratory muscle use, deconditioning  · Received fluids overnight  · Trend levels until normal    Parkinson disease (HCC)  Assessment & Plan  Continue sinemet    Depression, recurrent (HCC)  Assessment & Plan  Continue Buspar 10 mg tid and mirtazapine 30 mg    Ambulatory dysfunction  Assessment & Plan  · Multifactorial due to age, Parkinson's disease, peripheral neuropathy, chronic medical conditions   · Recently discharged from rehab, lives at home by himself, uses a walker  · Fall precautions  · PT/OT    Chronic pulmonary embolism ([facility] )  Assessment & Plan  · Per chart review, patient completed 6-month course of Eliquis per oncology in 2018  If patient develops another acute PE then will need lifelong AC  · No need for systemic anticoagulation at this time      Essential hypertension  Assessment & Plan  Continue home medications  BP stable    VTE Pharmacologic Prophylaxis: VTE Score: 11 High Risk (Score >/= 5) - Pharmacological DVT Prophylaxis Ordered: enoxaparin (Lovenox)  Sequential Compression Devices Ordered  Patient Centered Rounds: I performed bedside rounds with nursing staff today  Discussions with Specialists or Other Care Team Provider: , pulmonology    Education and Discussions with Family / Patient: Patient declined call to   Time Spent for Care: 30 minutes  More than 50% of total time spent on counseling and coordination of care as described above  Current Length of Stay: 1 day(s)  Current Patient Status: Inpatient   Certification Statement: The patient will continue to require additional inpatient hospital stay due to IV steroids  Discharge Plan: Anticipate discharge in 48 hrs to rehab facility      Code Status: Level 1 - Full Code    Subjective:   Seen this morning in no acute respiratory distress however still with mild wheezing  Denies chest pain, nausea, vomiting, fever or chills  States that his breathing and cough are better since yesterday  Objective:     Vitals:   Temp (24hrs), Av 7 °F (36 5 °C), Min:97 4 °F (36 3 °C), Max:98 2 °F (36 8 °C)    Temp:  [97 4 °F (36 3 °C)-98 2 °F (36 8 °C)] 97 9 °F (36 6 °C)  HR:  [] 86  Resp:  [18-24] 18  BP: (149-162)/(68-81) 149/81  SpO2:  [94 %-100 %] 96 %  There is no height or weight on file to calculate BMI  Input and Output Summary (last 24 hours): Intake/Output Summary (Last 24 hours) at 2022 1317  Last data filed at 2022 1011  Gross per 24 hour   Intake --   Output 350 ml   Net -350 ml       Physical Exam:   Physical Exam  Vitals reviewed  Constitutional:       General: He is not in acute distress  Appearance: He is ill-appearing  He is not toxic-appearing  HENT:      Head: Normocephalic and atraumatic  Mouth/Throat:      Pharynx: Oropharynx is clear  Eyes:      Extraocular Movements: Extraocular movements intact  Conjunctiva/sclera: Conjunctivae normal    Cardiovascular:      Rate and Rhythm: Normal rate and regular rhythm  Pulses: Normal pulses  Pulmonary:      Effort: Pulmonary effort is normal  No respiratory distress  Breath sounds: Wheezing present  Abdominal:      General: There is no distension  Palpations: Abdomen is soft  Tenderness: There is no abdominal tenderness  Musculoskeletal:         General: No swelling or tenderness  Normal range of motion  Skin:     General: Skin is warm and dry  Neurological:      General: No focal deficit present  Mental Status: He is alert and oriented to person, place, and time  Motor: Weakness present            Additional Data:     Labs:  Results from last 7 days   Lab Units 22  0426 22  1454   WBC Thousand/uL 7 79 8 08   HEMOGLOBIN g/dL 11 7* 12 8   HEMATOCRIT % 32 1* 35 8*   PLATELETS Thousands/uL 98* 113*   NEUTROS PCT %  --  82*   LYMPHS PCT %  --  12*   MONOS PCT %  --  4   EOS PCT %  --  1     Results from last 7 days   Lab Units 09/04/22  0426 09/03/22  1454   SODIUM mmol/L 138 137   POTASSIUM mmol/L 3 8 3 2*   CHLORIDE mmol/L 101 97   CO2 mmol/L 25 30   BUN mg/dL 23 29*   CREATININE mg/dL 0 96 1 13   ANION GAP mmol/L 12 10   CALCIUM mg/dL 8 7 9 1   ALBUMIN g/dL  --  4 1   TOTAL BILIRUBIN mg/dL  --  1 33*   ALK PHOS U/L  --  61   ALT U/L  --  32   AST U/L  --  21   GLUCOSE RANDOM mg/dL 154* 142*     Results from last 7 days   Lab Units 09/04/22  0426   INR  1 03             Results from last 7 days   Lab Units 09/04/22  0426 09/03/22  1658 09/03/22  1454   LACTIC ACID mmol/L  --  3 6* 2 8*   PROCALCITONIN ng/ml 0 13  --  0 15       Lines/Drains:  Invasive Devices  Report    Peripheral Intravenous Line  Duration           Peripheral IV 09/03/22 Left Antecubital <1 day                Imaging: Reviewed radiology reports from this admission including: chest xray    Recent Cultures (last 7 days):   Results from last 7 days   Lab Units 09/03/22  1454   BLOOD CULTURE  Received in Microbiology Lab  Culture in Progress  Received in Microbiology Lab  Culture in Progress         Last 24 Hours Medication List:   Current Facility-Administered Medications   Medication Dose Route Frequency Provider Last Rate    albuterol  2 5 mg Nebulization Q4H PRN Eloy Coleman MD      amLODIPine  10 mg Oral Daily Eloy Coleman MD      budesonide  0 5 mg Nebulization Q12H Eloy Coleman MD      busPIRone  10 mg Oral TID Eloy Coleman MD      chlorthalidone  25 mg Oral Daily Eloy Coleman MD      enoxaparin  40 mg Subcutaneous Daily Eloy Coleman MD      finasteride  5 mg Oral QAM Eloy Coleman MD      formoterol  20 mcg Nebulization Q12H Eloy Coleman MD      gabapentin  300 mg Oral TID Mitul Amaro MD      ipratropium  0 5 mg Nebulization TID Kary Rodriguez MD      levalbuterol  1 25 mg Nebulization TID Kary Rodriguez MD      methylPREDNISolone sodium succinate  40 mg Intravenous Omar Fernandez MD      mirtazapine  30 mg Oral HS Kary Rodriguez MD      roflumilast  250 mcg Oral Once Seth Worrell MD      Denita Pleva ON 9/5/2022] roflumilast  500 mcg Oral Daily Seth Worrell MD      tamsulosin  0 4 mg Oral Daily Kary Rodriguez MD          Today, Patient Was Seen By: Jonas Bates MD    **Please Note: This note may have been constructed using a voice recognition system  **

## 2022-09-04 NOTE — ACP (ADVANCE CARE PLANNING)
Serious Illness Conversation    1  What is your understanding now of where you are with your illness? Prognostic Understanding: overestimates prognosis     2  How much information about what is likely to be ahead with your illness would you like to have? Information: patient wants to be fully informed     3  What did you (clinician) communicate to the patient? Prognostic Communication: Function - I hope that this is not the case, but Im worried that this may be as strong as you will feel, and things are likely to get more difficult  4  If your health situation worsens, what are your most important goals? Goals: be mentally aware, be physically comfortable, not be a burden     5  What are the biggest fears and worries about the future and your health? Fears/Worries: loss of control, being dependent, loss of dignity, burdening others     6  What abilities are so critical to your life that you cannot imagine living without them? Unacceptable Function: being unable to interact with others, being unconscious, being chronically confused or not being myself, being in chronic severe pain, being unable to communicate effectively, being unable to talk, not being able to care for myself, including toileting and feeding     7  What gives you strength as you think about the future with your illness? I think  I can not get better with some rehab     8  If you become sicker, how much are you willing to go through for the possibility of gaining more time? Be in the hospital: No Have a feeding tube: No   Be in the ICU: Yes Live in a nursing home: Yes   Be on a ventilator: No Be uncomfortable: No   Be on dialysis: No Undergo aggressive test and/or procedures: Yes      9  How much does your proxy and family know about your priorities and wishes? Discussion Discussion: some discussion but incomplete        How does this plan sound to you? I will do everything I can to help you through this    We will continue treating your COPD as well as a alpha 1 antitrypsin deficiency   we will try and get you to pulmonary rehab as able   will try and get you to a long-term facility since you are not able to manage at home   have follow-up with pulmonology    Patient verbalized understanding of the plan      Time spent 30 minutes   More than half the time spent counseling and coordinating care    Advanced directives  Five Wishes: Patient does not have Five Wishes- would not like information

## 2022-09-04 NOTE — ASSESSMENT & PLAN NOTE
· Per chart review, patient completed 6-month course of Eliquis per oncology in 2018    If patient develops another acute PE then will need lifelong AC  · No need for systemic anticoagulation at this time

## 2022-09-05 LAB
ANION GAP SERPL CALCULATED.3IONS-SCNC: 10 MMOL/L (ref 4–13)
BASE EX.OXY STD BLDV CALC-SCNC: 98.3 % (ref 60–80)
BASE EXCESS BLDV CALC-SCNC: 0.7 MMOL/L
BUN SERPL-MCNC: 27 MG/DL (ref 5–25)
CALCIUM SERPL-MCNC: 8.3 MG/DL (ref 8.4–10.2)
CHLORIDE SERPL-SCNC: 103 MMOL/L (ref 96–108)
CO2 SERPL-SCNC: 25 MMOL/L (ref 21–32)
CREAT SERPL-MCNC: 1.03 MG/DL (ref 0.6–1.3)
ERYTHROCYTE [DISTWIDTH] IN BLOOD BY AUTOMATED COUNT: 14.9 % (ref 11.6–15.1)
GFR SERPL CREATININE-BSD FRML MDRD: 69 ML/MIN/1.73SQ M
GLUCOSE SERPL-MCNC: 153 MG/DL (ref 65–140)
HCO3 BLDV-SCNC: 24.1 MMOL/L (ref 24–30)
HCT VFR BLD AUTO: 29.8 % (ref 36.5–49.3)
HGB BLD-MCNC: 10.6 G/DL (ref 12–17)
LACTATE SERPL-SCNC: 6 MMOL/L (ref 0.5–2)
MCH RBC QN AUTO: 33.8 PG (ref 26.8–34.3)
MCHC RBC AUTO-ENTMCNC: 35.6 G/DL (ref 31.4–37.4)
MCV RBC AUTO: 95 FL (ref 82–98)
O2 CT BLDV-SCNC: 17.5 ML/DL
PCO2 BLDV: 34.2 MM HG (ref 42–50)
PH BLDV: 7.46 [PH] (ref 7.3–7.4)
PLATELET # BLD AUTO: 118 THOUSANDS/UL (ref 149–390)
PMV BLD AUTO: 10.3 FL (ref 8.9–12.7)
PO2 BLDV: 213 MM HG (ref 35–45)
POTASSIUM SERPL-SCNC: 3.6 MMOL/L (ref 3.5–5.3)
RBC # BLD AUTO: 3.14 MILLION/UL (ref 3.88–5.62)
SODIUM SERPL-SCNC: 138 MMOL/L (ref 135–147)
WBC # BLD AUTO: 9.82 THOUSAND/UL (ref 4.31–10.16)

## 2022-09-05 PROCEDURE — 97163 PT EVAL HIGH COMPLEX 45 MIN: CPT

## 2022-09-05 PROCEDURE — 94640 AIRWAY INHALATION TREATMENT: CPT

## 2022-09-05 PROCEDURE — 99232 SBSQ HOSP IP/OBS MODERATE 35: CPT | Performed by: NURSE PRACTITIONER

## 2022-09-05 PROCEDURE — 82805 BLOOD GASES W/O2 SATURATION: CPT

## 2022-09-05 PROCEDURE — 94664 DEMO&/EVAL PT USE INHALER: CPT

## 2022-09-05 PROCEDURE — 94760 N-INVAS EAR/PLS OXIMETRY 1: CPT

## 2022-09-05 PROCEDURE — 85027 COMPLETE CBC AUTOMATED: CPT | Performed by: INTERNAL MEDICINE

## 2022-09-05 PROCEDURE — 83605 ASSAY OF LACTIC ACID: CPT

## 2022-09-05 PROCEDURE — 80048 BASIC METABOLIC PNL TOTAL CA: CPT | Performed by: INTERNAL MEDICINE

## 2022-09-05 PROCEDURE — 99232 SBSQ HOSP IP/OBS MODERATE 35: CPT | Performed by: HOSPITALIST

## 2022-09-05 RX ORDER — SODIUM CHLORIDE 9 MG/ML
50 INJECTION, SOLUTION INTRAVENOUS CONTINUOUS
Status: DISPENSED | OUTPATIENT
Start: 2022-09-05 | End: 2022-09-05

## 2022-09-05 RX ADMIN — FINASTERIDE 5 MG: 5 TABLET, FILM COATED ORAL at 09:38

## 2022-09-05 RX ADMIN — LEVALBUTEROL HYDROCHLORIDE 1.25 MG: 1.25 SOLUTION, CONCENTRATE RESPIRATORY (INHALATION) at 20:14

## 2022-09-05 RX ADMIN — METHYLPREDNISOLONE SODIUM SUCCINATE 40 MG: 40 INJECTION, POWDER, FOR SOLUTION INTRAMUSCULAR; INTRAVENOUS at 11:06

## 2022-09-05 RX ADMIN — CHLORTHALIDONE 25 MG: 25 TABLET ORAL at 09:40

## 2022-09-05 RX ADMIN — BUDESONIDE 0.5 MG: 0.5 INHALANT ORAL at 20:23

## 2022-09-05 RX ADMIN — BUSPIRONE HYDROCHLORIDE 10 MG: 5 TABLET ORAL at 22:07

## 2022-09-05 RX ADMIN — BUSPIRONE HYDROCHLORIDE 10 MG: 5 TABLET ORAL at 09:38

## 2022-09-05 RX ADMIN — IPRATROPIUM BROMIDE 0.5 MG: 0.5 SOLUTION RESPIRATORY (INHALATION) at 08:22

## 2022-09-05 RX ADMIN — MIRTAZAPINE 30 MG: 15 TABLET, FILM COATED ORAL at 22:07

## 2022-09-05 RX ADMIN — FORMOTEROL FUMARATE DIHYDRATE 20 MCG: 20 SOLUTION RESPIRATORY (INHALATION) at 20:24

## 2022-09-05 RX ADMIN — GABAPENTIN 300 MG: 300 CAPSULE ORAL at 16:42

## 2022-09-05 RX ADMIN — ROFLUMILAST 500 MCG: 250 TABLET ORAL at 09:40

## 2022-09-05 RX ADMIN — IPRATROPIUM BROMIDE 0.5 MG: 0.5 SOLUTION RESPIRATORY (INHALATION) at 20:14

## 2022-09-05 RX ADMIN — FORMOTEROL FUMARATE DIHYDRATE 20 MCG: 20 SOLUTION RESPIRATORY (INHALATION) at 08:37

## 2022-09-05 RX ADMIN — SODIUM CHLORIDE 75 ML/HR: 0.9 INJECTION, SOLUTION INTRAVENOUS at 05:42

## 2022-09-05 RX ADMIN — GABAPENTIN 300 MG: 300 CAPSULE ORAL at 22:07

## 2022-09-05 RX ADMIN — LEVALBUTEROL HYDROCHLORIDE 1.25 MG: 1.25 SOLUTION, CONCENTRATE RESPIRATORY (INHALATION) at 13:21

## 2022-09-05 RX ADMIN — LEVALBUTEROL HYDROCHLORIDE 1.25 MG: 1.25 SOLUTION, CONCENTRATE RESPIRATORY (INHALATION) at 08:22

## 2022-09-05 RX ADMIN — AMLODIPINE BESYLATE 10 MG: 10 TABLET ORAL at 09:34

## 2022-09-05 RX ADMIN — BUSPIRONE HYDROCHLORIDE 10 MG: 5 TABLET ORAL at 16:42

## 2022-09-05 RX ADMIN — GABAPENTIN 300 MG: 300 CAPSULE ORAL at 09:38

## 2022-09-05 RX ADMIN — BUDESONIDE 0.5 MG: 0.5 INHALANT ORAL at 08:22

## 2022-09-05 RX ADMIN — METHYLPREDNISOLONE SODIUM SUCCINATE 40 MG: 40 INJECTION, POWDER, FOR SOLUTION INTRAMUSCULAR; INTRAVENOUS at 18:54

## 2022-09-05 RX ADMIN — IPRATROPIUM BROMIDE 0.5 MG: 0.5 SOLUTION RESPIRATORY (INHALATION) at 13:21

## 2022-09-05 RX ADMIN — METHYLPREDNISOLONE SODIUM SUCCINATE 40 MG: 40 INJECTION, POWDER, FOR SOLUTION INTRAMUSCULAR; INTRAVENOUS at 02:20

## 2022-09-05 RX ADMIN — ALBUTEROL SULFATE 2.5 MG: 2.5 SOLUTION RESPIRATORY (INHALATION) at 01:26

## 2022-09-05 RX ADMIN — TAMSULOSIN HYDROCHLORIDE 0.4 MG: 0.4 CAPSULE ORAL at 09:38

## 2022-09-05 RX ADMIN — SODIUM CHLORIDE 50 ML/HR: 0.9 INJECTION, SOLUTION INTRAVENOUS at 10:38

## 2022-09-05 RX ADMIN — ENOXAPARIN SODIUM 40 MG: 40 INJECTION SUBCUTANEOUS at 09:38

## 2022-09-05 NOTE — SEPSIS NOTE
Sepsis Note   Sergio Silver 65 y o  male MRN: [id number]  Unit/Bed#: S -01 Encounter: [id number]        Default Flowsheet Data (last 720 hours)     Sepsis Reassess     Row Name 09/04/22 2134                   Repeat Volume Status and Tissue Perfusion Assessment Performed    Repeat Volume Status and Tissue Perfusion Assessment Performed --                  Volume Status and Tissue Perfusion Post Fluid Resuscitation * Must Document All *    Vital Signs Reviewed (HR, RR, BP, T) Yes  -TF        Shock Index Reviewed --        Arterial Oxygen Saturation Reviewed (POx, SaO2 or SpO2) --        Cardio Tachycardia  -TF        Pulmonary --        Capillary Refill --        Peripheral Pulses --        Skin Warm  -TF        Urine output assessed --                  *OR*   Intensive Monitoring- Must Document One of the Following Four *:    Vital Signs Reviewed --        * Central Venous Pressure (CVP or RAP) --        * Central Venous Oxygen (SVO2, ScvO2 or Oxygen saturation via central catheter) --        * Bedside Cardiovascular US in IVC diameter and % collapse --        * Passive Leg Raise OR Crystalloid Challenge --              User Key  (r) = Recorded By, (t) = Taken By, (c) = Cosigned By    Initials Name Provider Type    TF Juana Quinteros DO Resident                Although patient has elevated lactic acid of 5 7 (despite slowly increasing), lactic acidosis has been noted during prior hospitalizations as well  Per most recent progress note, "low suspicion for sepsis 2/2 increased respiratory muscle use and deconditioning " Even upon evaluation from Pulmonology, lactic acidosis likely related to COPD hypoxia and beta agonist nebulizer use  Likely type 2A  Pt has stable vitals at this time and not septic

## 2022-09-05 NOTE — PROGRESS NOTES
[facility]  Progress Note - Carleen Esquivel 1945, 65 y o  male MRN: [id number]  Unit/Bed#: S -01 Encounter: [id number]  Primary Care Provider: Becky Salguero MD   Date and time admitted to hospital: 9/3/2022  1:55 PM    * COPD with acute exacerbation [facility])  Assessment & Plan  Patient returning to the hospital for another severe exacerbation of his COPD  Follows with [facility]  Last admission 8/18 for exacerbation of COPD and was on tapered dose of prednisone and required azithromycin  Several readmissions in the past 6 months for COPD exacerbation  Workup for this admission shows no evidence of infection  WBC normal, chest x-ray no evidence of cardiopulmonary disease, COVID/flu/RSV negative    Plan  · Continue IV Solu-Medrol 40 mg every 8 hours  · Atrovent/Xopenex q i d  · Continue performist and Pulmicort  · Respiratory protocol, airway clearance, antitussives and incentive spirometry    · Appreciate Pulmonology recommendations  · Daliresp 500 mg daily   · Procal negative x2, No antibiotic requirements at this time  · Pulmonary rehab  · Palliative care consulted; [address] discussion given severe COPD with recurrent admissions      Acute on chronic respiratory failure ([facility] [address] )  Assessment & Plan    · Chronically on 3 L oxygen secondary to centrilobular emphysema and alpha 1 antitrypsin deficiency  · Chest x-ray with no evidence of cardiopulmonary disease  · Monitor oxygen requirements and keep SpO2 over 88%  · Currently on 3-4 L NC  · Continue incentive spirometry  · Treatment for COPD exacerbation as above  · Pulmonology on board      Ambulatory dysfunction  Assessment & Plan  · Multifactorial due to age, Parkinson's disease, peripheral neuropathy, chronic medical conditions   · Recently discharged from rehab, lives at home by himself, uses a walker  · Fall precautions  · PT/OT    Lactic acidosis  Assessment & Plan  · Low suspicion for sepsis  · 2/2 increased respiratory muscle use, deconditioning  · Received fluids overnight  · Trend levels until normal    SIRS (systemic inflammatory response syndrome) ([facility] [address] )  Assessment & Plan  · Patient presented to the ED with tachycardia and tachypnea  · No fever, no evidence of infection at this time  · White blood cell normal, procalcitonin normal, blood cultures pending    Parkinson disease ([facility] [address] )  Assessment & Plan  Continue sinemet    Depression, recurrent (HCC)  Assessment & Plan  Continue Buspar 10 mg tid and mirtazapine 30 mg    Chronic pulmonary embolism ([facility] )  Assessment & Plan  · Per chart review, patient completed 6-month course of Eliquis per oncology in 2018  If patient develops another acute PE then will need lifelong AC  · No need for systemic anticoagulation at this time      Essential hypertension  Assessment & Plan  Continue home medications  BP stable    Alpha-1-antitrypsin deficiency (HCC)  Assessment & Plan  · On Zemaira infusion per pulmonology  · Unable to get his infusions for the past 1 month because he was hospitalized and then had rehab             VTE Pharmacologic Prophylaxis: VTE Score: 11 High Risk (Score >/= 5) - Pharmacological DVT Prophylaxis Ordered: enoxaparin (Lovenox)  Sequential Compression Devices Ordered  Patient Centered Rounds: I performed bedside rounds with nursing staff today  Discussions with Specialists or Other Care Team Provider: pulmonology    Education and Discussions with Family / Patient: Updated  (daughter) via phone  Current Length of Stay: 2 day(s)  Current Patient Status: Inpatient   Discharge Plan: Anticipate discharge in 48 hrs to rehab facility  Code Status: Level 1 - Full Code    Subjective:   Patient seen this looks, looks very much improved than on admission, less audible wheezing and is able to speak in lynch sentences   Reports he had a hard time falling asleep yesterday; not a new problem and melatonin does not help/ otherwise shortness of breath is decreased  He has no cough or   Conversations on going about goal of care and palliative care, patient seems in denial of current state, and not ready for palliative care consult  Spoke with daughter, Stephanie Esquivel who understands the complexities of her fathers medical problems now    Objective:     Vitals:   Temp (24hrs), Av 8 °F (36 6 °C), Min:97 5 °F (36 4 °C), Max:98 1 °F (36 7 °C)    Temp:  [97 5 °F (36 4 °C)-98 1 °F (36 7 °C)] 97 5 °F (36 4 °C)  HR:  [] 103  Resp:  [18-22] 22  BP: (136-161)/(67-81) 161/81  SpO2:  [94 %-98 %] 97 %  There is no height or weight on file to calculate BMI  Input and Output Summary (last 24 hours): Intake/Output Summary (Last 24 hours) at 2022 0547  Last data filed at 2022 0542  Gross per 24 hour   Intake 990 ml   Output 850 ml   Net 140 ml       Physical Exam:   Physical Exam  Constitutional:       Appearance: Normal appearance  HENT:      Head: Normocephalic and atraumatic  Nose: No congestion or rhinorrhea  Mouth/Throat:      Mouth: Mucous membranes are moist    Eyes:      Conjunctiva/sclera: Conjunctivae normal    Cardiovascular:      Rate and Rhythm: Normal rate and regular rhythm  Pulmonary:      Breath sounds: Wheezing present  Abdominal:      General: Bowel sounds are normal  There is no distension  Palpations: Abdomen is soft  Tenderness: There is no abdominal tenderness  Musculoskeletal:      Right lower leg: No edema  Left lower leg: No edema  Skin:     General: Skin is warm and dry  Neurological:      General: No focal deficit present  Mental Status: He is alert and oriented to person, place, and time           Additional Data:     Labs:  Results from last 7 days   Lab Units 22  0437 22  0426 22  1454   WBC Thousand/uL 9 82   < > 8 08   HEMOGLOBIN g/dL 10 6*   < > 12 8   HEMATOCRIT % 29 8*   < > 35 8*   PLATELETS Thousands/uL 118*   < > 113*   NEUTROS PCT %  --   --  82*   LYMPHS PCT %  -- --  12*   MONOS PCT %  --   --  4   EOS PCT %  --   --  1    < > = values in this interval not displayed  Results from last 7 days   Lab Units 09/04/22  0426 09/03/22  1454   SODIUM mmol/L 138 137   POTASSIUM mmol/L 3 8 3 2*   CHLORIDE mmol/L 101 97   CO2 mmol/L 25 30   BUN mg/dL 23 29*   CREATININE mg/dL 0 96 1 13   ANION GAP mmol/L 12 10   CALCIUM mg/dL 8 7 9 1   ALBUMIN g/dL  --  4 1   TOTAL BILIRUBIN mg/dL  --  1 33*   ALK PHOS U/L  --  61   ALT U/L  --  32   AST U/L  --  21   GLUCOSE RANDOM mg/dL 154* 142*     Results from last 7 days   Lab Units 09/04/22  0426   INR  1 03             Results from last 7 days   Lab Units 09/04/22  1733 09/04/22  1357 09/04/22  0426 09/03/22  1658 09/03/22  1454   LACTIC ACID mmol/L 5 7* 5 1*  --  3 6* 2 8*   PROCALCITONIN ng/ml  --   --  0 13  --  0 15       Lines/Drains:  Invasive Devices  Report    Peripheral Intravenous Line  Duration           Peripheral IV 09/03/22 Left Antecubital 1 day                      Imaging: No pertinent imaging reviewed  Recent Cultures (last 7 days):   Results from last 7 days   Lab Units 09/03/22  1454   BLOOD CULTURE  No Growth at 24 hrs  No Growth at 24 hrs         Last 24 Hours Medication List:   Current Facility-Administered Medications   Medication Dose Route Frequency Provider Last Rate    albuterol  2 5 mg Nebulization Q4H PRN Sandoval Zazueta MD      amLODIPine  10 mg Oral Daily Sandoval Zazueta MD      budesonide  0 5 mg Nebulization Q12H Sandoval Zazueta MD      busPIRone  10 mg Oral TID Sandoval Zazueta MD      chlorthalidone  25 mg Oral Daily Sandoval Zazueta MD      enoxaparin  40 mg Subcutaneous Daily Sandoval Zazueta MD      finasteride  5 mg Oral QAM Caity Padilla MD      formoterol  20 mcg Nebulization Q12H Sandoval Zazueta MD      gabapentin  300 mg Oral TID Karlie Regalado MD      ipratropium  0 5 mg Nebulization TID Jeri Spivey MD      levalbuterol  1 25 mg Nebulization TID Caity Blanchard MD      methylPREDNISolone sodium succinate  40 mg Intravenous Q8H Jeri Spivey MD      mirtazapine  30 mg Oral HS Jeri Spivey MD      roflumilast  500 mcg Oral Daily Blanca Brand MD      sodium chloride  75 mL/hr Intravenous Continuous Anshu Andrade MD 75 mL/hr (09/05/22 0542)    tamsulosin  0 4 mg Oral Daily Jeri Spivey MD          Today, Patient Was Seen By: Jeri Spivey MD    **Please Note: This note may have been constructed using a voice recognition system  **

## 2022-09-05 NOTE — ASSESSMENT & PLAN NOTE
Patient returning to the hospital for another severe exacerbation of his COPD  Follows with [facility]  Last admission 8/18 for exacerbation of COPD and was on tapered dose of prednisone and required azithromycin  Several readmissions in the past 6 months for COPD exacerbation  Workup for this admission shows no evidence of infection  WBC normal, chest x-ray no evidence of cardiopulmonary disease, COVID/flu/RSV negative    Plan  · Continue IV Solu-Medrol 40 mg every 8 hours  · Atrovent/Xopenex q i d  · Continue performist and Pulmicort  · Respiratory protocol, airway clearance, antitussives and incentive spirometry    · Appreciate Pulmonology recommendations  · Daliresp 500 mg daily   · Procal negative x2, No antibiotic requirements at this time  · Pulmonary rehab  · Palliative care consulted; [address] discussion given severe COPD with recurrent admissions

## 2022-09-05 NOTE — PHYSICAL THERAPY NOTE
PHYSICAL THERAPY EVALUATION NOTE          Patient Name: Wm Fournier  TLPOB'V Date: 9/5/2022      AGE:   65 y o  Mrn:   [id number]  ADMIT DX:  SOB (shortness of breath) [R06 02]  Fall [W19  XXXA]  COPD exacerbation ([facility] ) [J44 1]    Past Medical History:  Past Medical History:   Diagnosis Date    Anesthesia complication     Difficult to wake up    Arthritis     BPH (benign prostatic hyperplasia)     urinary frequency    Cancer (HCC)     basal cell neck, face    CKD (chronic kidney disease) stage 2, GFR 60-89 ml/min 07/04/2022    COPD (chronic obstructive pulmonary disease) (HCC)     COPD exacerbation (HCC) 12/29/2019    Coronary artery disease     Elevated PSA 10/25/2018    Full dentures     Hiatal hernia     History of methicillin resistant staphylococcus aureus (MRSA)     10/11/2018 MRSA (nares) positive    Hypertension     Irritable bowel syndrome     Kidney stone     at least 7 episodes    Liver disease     Alpha 1- enzyme deficiency - diagnosed 2002  has been on weekly replacement therapy since then    Parkinson disease [facility])     Pulmonary emphysema ([place] [address] )     1/25/15  FEV1 - 2 45 liters or 59% of predicted    RSV infection 12/2017    SIRS (systemic inflammatory response syndrome) ([place] [address] ) 8/14/2022    Wears glasses     for driving only       Past Surgical History:  Past Surgical History:   Procedure Laterality Date    BACK SURGERY  2008    discectomy    CARDIAC CATHETERIZATION N/A 5/3/2022    Procedure: Cardiac catheterization both left and right heart catheterization with vaso dilatory trial;  Surgeon: Elida Ott MD;  Location: [place] [address] CATH LAB; Service: Cardiology    CARDIAC CATHETERIZATION Left 5/3/2022    Procedure: Cardiac Left Heart Cath;  Surgeon: Elida Ott MD;  Location: [place] [address] CATH LAB;   Service: Cardiology    CARDIAC CATHETERIZATION Left 5/3/2022    Procedure: Cardiac Left Ventriculogram;  Surgeon: Elida Ott MD;  Location: [facility] CARDIAC CATH LAB; Service: Cardiology    COLONOSCOPY      COLONOSCOPY N/A 3/10/2017    Procedure: [place];  Surgeon: Carlos Eduardo Odell MD;  Location: [facility] GI LAB; Service:     CYSTOSCOPY      removal of kidney stones    ESOPHAGOGASTRODUODENOSCOPY N/A 3/10/2017    Procedure: ESOPHAGOGASTRODUODENOSCOPY (EGD); Surgeon: Carlos Eduardo Odell MD;  Location: [facility] GI LAB; Service:     LITHOTRIPSY      [place] ESOPHAGOGASTRODUODENOSCOPY TRANSORAL DIAGNOSTIC N/A 2018    Procedure: ESOPHAGOGASTRODUODENOSCOPY (EGD); Surgeon: Carlos Eduardo Odell MD;  Location: [facility] GI LAB; Service: Gastroenterology    TONSILLECTOMY      VEIN LIGATION AND STRIPPING Bilateral     1980's     Length Of Stay: 2        PHYSICAL THERAPY EVALUATION:    Patient's identity confirmed via 2 patient identifiers (full name and ) at start of session       22 0852   PT Last Visit   PT Visit Date 22   Note Type   Note type Evaluation   Pain Assessment   Pain Assessment Tool 0-10   Pain Score No Pain   Restrictions/Precautions   Weight Bearing Precautions Per Order No   Other Precautions Chair Alarm; Bed Alarm;O2;Multiple lines  (3L O2 via NC, pt's baseline)   Home Living   Type of [address] One level;Performs ADLs on one level; Able to live on main level with bedroom/bathroom;Stairs to enter with rails  (2 TEMI)   Bathroom Shower/Tub Walk-in shower  (has not been able to use recently due to multiple recent hospital and rehab admissions)   [address] bars in shower; Shower chair   Home Equipment Walker;Grab bars  (RW, rollator walker)   Additional Comments Pt lives alone in a 1 level house w/ 2 TEMI; has walk-in shower available, but has been recently sponge-bathing as needed   Prior Function   Level of [place] Independent with ADLs and functional mobility   Lives With (S)  [address] From Family  (daughter grocery shops 1x/wk)   [address] [facility]  (sponge-bathing) IADLs Needs assistance  (hasn't driven in approx 4 months, daughter completes grocery shopping 1x/wk, pt has cleaning service)   Falls in the last 6 months 1 to 4  (1 fall at home since DC from STR 2 days PTA)   Comments Pt reports he was ambulating independently w/ RW at home since DC from [facility]   General   Additional Pertinent History Pt DC from STR 2 days prior to current hospital admission   Family/Caregiver Present No   Cognition   Overall Cognitive Status Impaired  (pt appears to have limited insight into current deficits and decreased safety awareness)   Arousal/Participation Cooperative   Orientation Level Oriented X4  (Aware in hospital)   Memory Decreased recall of precautions   Following Commands Follows one step commands without difficulty   Comments Pt ID via name and ; pt agreeable to PT eval and OOB mobility  Pt appears to have limited insight into current deficits and limitations due to SOB/WALKER  Pt educated on continued use of bedside commode w/ assistance for toileting due to pt's extremely limited ambulatory endurance   Strength RLE   RLE Overall Strength 3+/5  (grossly assessed w/ functional mobility)   Strength LLE   LLE Overall Strength 3+/5  (grossly assessed w/ functional mobility)   Bed Mobility   Supine to Sit 5  Supervision   Additional items Assist x 1;HOB elevated; Bedrails; Increased time required;Verbal cues   Sit to Supine Unable to assess   Additional items   (pt OOB in recliner chair at end of session)   Additional Comments Pt requires 3-4 min theraputic rest breaks between each mobility trial due to WALKER   Transfers   Sit to Stand 4  Minimal assistance   Additional items Assist x 1; Increased time required;Verbal cues  (bed elevated)   Stand to Sit 4  Minimal assistance   Additional items Assist x 1; Armrests; Increased time required;Verbal cues   Ambulation/Elevation   Gait pattern Improper Weight shift; Forward Flexion; Wide FATOU; Decreased foot clearance; Short stride; Excessively slow Gait Assistance 4  Minimal assist   Additional items Assist x 1;Verbal cues   Assistive Device Rolling walker   Distance 3'  (to recliner chair)   Ambulation/Elevation Additional Comments Pt limited in ambulatory endurance and overall tolerance to functional mobility due to ongoing WALKER   Balance   Static Sitting Fair   Dynamic Sitting Fair -   Static Standing Poor +   Dynamic Standing Poor +   Ambulatory Poor +  (w/ RW)   Endurance Deficit   Endurance Deficit Yes   Endurance Deficit Description Pt limited in ambulatory endurance and overall tolerance to functional mobility due to ongoing WALKER w/ seated theraputic rest breaks required; pt's SpO2 at or above 95% on 3L throughout mobility   Activity Tolerance   Activity Tolerance Patient limited by fatigue  (SOB/WALKER)   Nurse Made Aware EMY Perez   Assessment   Prognosis Fair   Problem List Decreased strength;Decreased endurance; Impaired balance;Decreased mobility; Impaired judgement;Decreased safety awareness   Assessment Valorie Buchanan is a 65 y o  Male who [facility]2022 w/ c/o SOB and fall and diagnosis of COPD w/ acute exacerbation  Orders for PT eval and treat received, w/ activity orders of up and out of bed as tolerated  Comorbidities affecting pt at time of evaluation include: COPD, HTN, CKD, Parkinson's Disease, chronic PE, peripheral neuropathy  Personal factors affecting DC include: inaccessible home environment, ambulating w/ assistive device, stairs to enter home, inability to ambulate household distances, inability to navigate level surfaces w/o external assistance, limited home support, positive fall history, limited insight into impairments and inability to perform IADLs  PTA, pt reports mobilizing independently w/ RW, and w/ 1 fall(s) in the previous 6 months (past week)  Upon evaluation, pt presents w/ the following deficits: weakness, impaired balance, decreased endurance and gait deviations   Pt currently requires supervisiion for bed mobility, min Ax1 for transfers, and min Ax1 w/ RW for ambulation  Pt's clinical presentation is unstable/unpredictable due to need for assist w/ all phases of mobility when usually mobilizing independently, tolerance to only 3 feet of ambulation, need for supplemental oxygen in order to maintain oxygen saturation, need for input for mobility technique/safety, recent readmission (within 30 days) and recent history of falls  Pt is at an increased risk of falls due to impaired balance, decreased endurance, decreased safety awareness  From a PT/mobility standpoint, given the above findings, DC recommendation is: Post-acute inpatient rehabilitation  During current admission, pt will benefit from continued skilled inpatient PT in the acute care setting in order to address the above deficits and to maximize function and mobility prior to DC from acute care  Barriers to Discharge Inaccessible home environment;Decreased caregiver support   Goals   Patient Goals to walk into the bathroom and not have to use the bedside commode   STG Expiration Date 09/15/22   Short Term Goal #1 Pt will: perform bed mobility w/ mod I to decrease pt's burden of care and increase pt's independence w/ repositioning in bed; perform transfers w/ mod I to increase pt's OOB mobility; ambulate at least 50' w/ LRAD and mod I to increase pt's ambulatory endurance/tolerance; negotiate 2 steps w/ UE support and supervision to facilitate pt returning to previous living environment; increase all balance ratings by at least 1 grade to decrease pt's risk of falls   PT Treatment Day 0   Plan   Treatment/Interventions Functional transfer training;LE strengthening/ROM; Elevations; Therapeutic exercise; Endurance training;Patient/family training;Equipment eval/education; Bed mobility;Gait training   PT Frequency 3-5x/wk   Recommendation   PT Discharge Recommendation Post acute rehabilitation services   Equipment Recommended [address] [facility] Recommended Wheeled walker   AM-PAC Basic Mobility Inpatient   Turning in Bed Without Bedrails 3   Lying on Back to Sitting on Edge of Flat Bed 3   Moving Bed to Chair 3   Standing Up From Chair 2   Walk in Room 2   Climb 3-5 Stairs 1   Basic Mobility Inpatient Raw Score 14   Basic Mobility Standardized Score 35 55   Highest Level Of Mobility   -[facility] Goal 4: Move to chair/commode   -HLM Achieved 4: Move to chair/commode   End of Consult   Patient Position at End of Consult Bedside chair;Bed/Chair alarm activated; All needs within reach       The patient's AM-PAC Basic Mobility Inpatient Short Form Raw Score is 14  A Raw score of less than or equal to 16 suggests the patient may benefit from discharge to post-acute rehabilitation services  Please also refer to the recommendation of the Physical Therapist for safe discharge planning      Pt will benefit from skilled inpatient PT during this admission in order to facilitate progress towards goals and to maximize functional independence prior to [address] rec: post acute rehab        Fareed Negron, PT, DPT  09/05/22

## 2022-09-05 NOTE — PLAN OF CARE
Problem: MOBILITY - ADULT  Goal: Maintain or return to baseline ADL function  Description: INTERVENTIONS:  -  Assess patient's ability to carry out ADLs; assess patient's baseline for ADL function and identify physical deficits which impact ability to perform ADLs (bathing, care of mouth/teeth, toileting, grooming, dressing, etc )  - Assess/evaluate cause of self-care deficits   - Assess range of motion  - Assess patient's mobility; develop plan if impaired  - Assess patient's need for assistive devices and provide as appropriate  - Encourage maximum independence but intervene and supervise when necessary  - Involve family in performance of ADLs  - Assess for home care needs following discharge   - Consider OT consult to assist with ADL evaluation and planning for discharge  - Provide patient education as appropriate  Outcome: Progressing  Goal: Maintains/Returns to pre admission functional level  Description: INTERVENTIONS:  - Perform BMAT or MOVE assessment daily    - Set and communicate daily mobility goal to care team and patient/family/caregiver     - Collaborate with rehabilitation services on mobility goals if consulted  - Perform Range of Motion   - Reposition patient  - Dangle patient   - Stand patient  - Ambulate patient   - Out of bed to chair  - Out of bed for meals   - Out of bed for toileting  - Record patient progress and toleration of activity level   Outcome: Progressing     Problem: Potential for Falls  Goal: Patient will remain free of falls  Description: INTERVENTIONS:  - Educate patient/family on patient safety including physical limitations  - Instruct patient to call for assistance with activity   - Consult OT/PT to assist with strengthening/mobility   - Keep Call bell within reach  - Keep bed low and locked with side rails adjusted as appropriate  - Keep care items and personal belongings within reach  - Initiate and maintain comfort rounds  - Make Fall Risk Sign visible to staff  - Apply yellow socks and bracelet for high fall risk patients  - Consider moving patient to room near nurses station  Outcome: Progressing     Problem: Prexisting or High Potential for Compromised Skin Integrity  Goal: Skin integrity is maintained or improved  Description: INTERVENTIONS:  - Identify patients at risk for skin breakdown  - Assess and monitor skin integrity  - Assess and monitor nutrition and hydration status  - Monitor labs   - Assess for incontinence   - Turn and reposition patient  - Assist with mobility/ambulation  - Relieve pressure over bony prominences  - Avoid friction and shearing  - Provide appropriate hygiene as needed including keeping skin clean and dry  - Evaluate need for skin moisturizer/barrier cream  - Collaborate with interdisciplinary team   - Patient/family teaching  - Consider wound care consult   Outcome: Progressing     Problem: RESPIRATORY - ADULT  Goal: Achieves optimal ventilation and oxygenation  Description: INTERVENTIONS:  - Assess for changes in respiratory status  - Assess for changes in mentation and behavior  - Position to facilitate oxygenation and minimize respiratory effort  - Oxygen administered by appropriate delivery if ordered  - Initiate smoking cessation education as indicated  - Encourage broncho-pulmonary hygiene including cough, deep breathe, Incentive Spirometry  - Assess the need for suctioning and aspirate as needed  - Assess and instruct to report SOB or any respiratory difficulty  - Respiratory Therapy support as indicated  Outcome: Progressing

## 2022-09-05 NOTE — PROGRESS NOTES
Progress Note - Pulmonary   Austen Dorantes 65 y o  male MRN: [id number]  Unit/Bed#: S -01 Encounter: [id number]    Assessment/Plan:    1  Acute pulmonary insufficiency on chronic hypoxic respiratory failure       -  currently on home O2 requirements of 3 L-97%       -  maintain saturations greater than 88%       -  pulmonary toileting:  Deep breathing cough, OOB as tolerated, IS Q 1 hr    2  Severe COPD with acute exacerbation       -  still reporting significant shortness of breath       -  Inpatient: Will continue IV Solu-Medrol 40 mg t i d , budesonide/Perforomist b i d , Xopenex/Atrovent t i d        -  home regimen:  Budesonide/Perforomist b i d , Ventolin 2 puffs Q 4 p r n , Xopenex/Atrovent t i d , Daliresp 250 mg daily       -  continue increased dosing of Daliresp 500 mg daily       -  may consider palliative follow-up    3  Alpha-1 antitrypsin deficiency       -  unfortunately patient has missed weekly replacement of therapy with alpha-1 proteinase inhibitor for approximately 1 month       -  patient has medication at home at which [facility] administers    4  H/O PE       -  2018- chronic PE extending from left main into left lower interlobular PA       -  completed 6 month course of anticoagulation       -  if PE recurs will need lifelong anticoagulation        Chief Complaint:    "I am still very short of breath"    Subjective:    Rich was comfortably sitting in his bed  He reports after sitting up from sleeping he feels very short of breath  No significant overnight events reported  Patient currently denying any fevers, chills, hemoptysis, headaches, night sweats, pleuritic chest pain, or palpitations  Objective:    Vitals: Blood pressure 161/81, pulse 103, temperature 97 5 °F (36 4 °C), resp  rate 22, SpO2 97 %  3L,There is no height or weight on file to calculate BMI        Intake/Output Summary (Last 24 hours) at 9/5/2022 0701  Last data filed at 9/5/2022 0542  Gross per 24 hour   Intake 990 ml Output 850 ml   Net 140 ml       Invasive Devices  Report    Peripheral Intravenous Line  Duration           Peripheral IV 09/03/22 Left Antecubital 1 day                Physical Exam:  Physical Exam  Constitutional:       General: He is not in acute distress  Appearance: Normal appearance  He is normal weight  He is not ill-appearing  HENT:      Head: Normocephalic and atraumatic  Nose: Nose normal  No congestion or rhinorrhea  Mouth/Throat:      Mouth: Mucous membranes are dry  Pharynx: No oropharyngeal exudate or posterior oropharyngeal erythema  Cardiovascular:      Rate and Rhythm: Normal rate and regular rhythm  Pulses: Normal pulses  Heart sounds: Normal heart sounds  No murmur heard  No friction rub  No gallop  Pulmonary:      Effort: Pulmonary effort is normal  No tachypnea, bradypnea, accessory muscle usage or respiratory distress  Breath sounds: Decreased air movement present  No stridor  Decreased breath sounds present  No wheezing, rhonchi or rales  Comments: Diminished aeration some scattered wheezing  Chest:      Chest wall: No tenderness  Abdominal:      General: Abdomen is flat  Bowel sounds are normal  There is no distension  Palpations: Abdomen is soft  There is no mass  Musculoskeletal:         General: No swelling or tenderness  Normal range of motion  Cervical back: Normal range of motion  No rigidity or tenderness  Skin:     General: Skin is warm and dry  Coloration: Skin is not jaundiced or pale  Neurological:      General: No focal deficit present  Mental Status: He is alert and oriented to person, place, and time  Mental status is at baseline  Psychiatric:         Mood and Affect: Mood normal          Behavior: Behavior normal          Labs:    I have personally reviewed pertinent lab results CBC:   Lab Results   Component Value Date    WBC 9 82 09/05/2022    HGB 10 6 (L) 09/05/2022    HCT 29 8 (L) 09/05/2022 MCV 95 09/05/2022     (L) 09/05/2022    MCH 33 8 09/05/2022    MCHC 35 6 09/05/2022    RDW 14 9 09/05/2022    MPV 10 3 09/05/2022   , CMP:   Lab Results   Component Value Date    SODIUM 138 09/05/2022    K 3 6 09/05/2022     09/05/2022    CO2 25 09/05/2022    BUN 27 (H) 09/05/2022    CREATININE 1 03 09/05/2022    CALCIUM 8 3 (L) 09/05/2022    EGFR 69 09/05/2022       Imaging and other studies: I have personally reviewed pertinent films in PACS     Chest x-ray- 9/3/2022- emphysematous changes

## 2022-09-05 NOTE — PLAN OF CARE
Problem: PHYSICAL THERAPY ADULT  Goal: Performs mobility at highest level of function for planned discharge setting  See evaluation for individualized goals  Description: Treatment/Interventions: Functional transfer training, LE strengthening/ROM, Elevations, Therapeutic exercise, Endurance training, Patient/family training, Equipment eval/education, Bed mobility, Gait training  Equipment Recommended: Alex Cueto       See flowsheet documentation for full assessment, interventions and recommendations  9/5/2022 1057 by Johanna Holley PT  Note: Prognosis: Fair  Problem List: Decreased strength, Decreased endurance, Impaired balance, Decreased mobility, Impaired judgement, Decreased safety awareness  Assessment: Kathia Sweet is a 65 y o  Male who [facility] on 9/3/2022 w/ c/o SOB and fall and diagnosis of COPD w/ acute exacerbation  Orders for PT eval and treat received, w/ activity orders of up and out of bed as tolerated  Comorbidities affecting pt at time of evaluation include: COPD, HTN, CKD, Parkinson's Disease, chronic PE, peripheral neuropathy  Personal factors affecting DC include: inaccessible home environment, ambulating w/ assistive device, stairs to enter home, inability to ambulate household distances, inability to navigate level surfaces w/o external assistance, limited home support, positive fall history, limited insight into impairments and inability to perform IADLs  PTA, pt reports mobilizing independently w/ RW, and w/ 1 fall(s) in the previous 6 months (past week)  Upon evaluation, pt presents w/ the following deficits: weakness, impaired balance, decreased endurance and gait deviations  Pt currently requires supervisiion for bed mobility, min Ax1 for transfers, and min Ax1 w/ RW for ambulation   Pt's clinical presentation is unstable/unpredictable due to need for assist w/ all phases of mobility when usually mobilizing independently, tolerance to only 3 feet of ambulation, need for supplemental oxygen in order to maintain oxygen saturation, need for input for mobility technique/safety, recent readmission (within 30 days) and recent history of falls  Pt is at an increased risk of falls due to impaired balance, decreased endurance, decreased safety awareness  From a PT/mobility standpoint, given the above findings, DC recommendation is: Post-acute inpatient rehabilitation  During current admission, pt will benefit from continued skilled inpatient PT in the acute care setting in order to address the above deficits and to maximize function and mobility prior to DC from acute care  Barriers to Discharge: Inaccessible home environment, Decreased caregiver support     PT Discharge Recommendation: Post acute rehabilitation services    See flowsheet documentation for full assessment

## 2022-09-06 LAB
ANION GAP SERPL CALCULATED.3IONS-SCNC: 9 MMOL/L (ref 4–13)
BASOPHILS # BLD AUTO: 0.01 THOUSANDS/ΜL (ref 0–0.1)
BASOPHILS NFR BLD AUTO: 0 % (ref 0–1)
BILIRUB UR QL STRIP: NEGATIVE
BUN SERPL-MCNC: 29 MG/DL (ref 5–25)
CALCIUM SERPL-MCNC: 8.9 MG/DL (ref 8.4–10.2)
CHLORIDE SERPL-SCNC: 102 MMOL/L (ref 96–108)
CLARITY UR: CLEAR
CO2 SERPL-SCNC: 27 MMOL/L (ref 21–32)
COLOR UR: NORMAL
CREAT SERPL-MCNC: 0.89 MG/DL (ref 0.6–1.3)
EOSINOPHIL # BLD AUTO: 0 THOUSAND/ΜL (ref 0–0.61)
EOSINOPHIL NFR BLD AUTO: 0 % (ref 0–6)
ERYTHROCYTE [DISTWIDTH] IN BLOOD BY AUTOMATED COUNT: 14.9 % (ref 11.6–15.1)
GFR SERPL CREATININE-BSD FRML MDRD: 81 ML/MIN/1.73SQ M
GLUCOSE SERPL-MCNC: 144 MG/DL (ref 65–140)
GLUCOSE UR STRIP-MCNC: NEGATIVE MG/DL
HCT VFR BLD AUTO: 34.8 % (ref 36.5–49.3)
HGB BLD-MCNC: 11.9 G/DL (ref 12–17)
HGB UR QL STRIP.AUTO: NEGATIVE
IMM GRANULOCYTES # BLD AUTO: 0.2 THOUSAND/UL (ref 0–0.2)
IMM GRANULOCYTES NFR BLD AUTO: 2 % (ref 0–2)
KETONES UR STRIP-MCNC: NEGATIVE MG/DL
LACTATE SERPL-SCNC: 3.1 MMOL/L (ref 0.5–2)
LEUKOCYTE ESTERASE UR QL STRIP: NEGATIVE
LYMPHOCYTES # BLD AUTO: 1.02 THOUSANDS/ΜL (ref 0.6–4.47)
LYMPHOCYTES NFR BLD AUTO: 10 % (ref 14–44)
MCH RBC QN AUTO: 32.7 PG (ref 26.8–34.3)
MCHC RBC AUTO-ENTMCNC: 34.2 G/DL (ref 31.4–37.4)
MCV RBC AUTO: 96 FL (ref 82–98)
MONOCYTES # BLD AUTO: 0.35 THOUSAND/ΜL (ref 0.17–1.22)
MONOCYTES NFR BLD AUTO: 3 % (ref 4–12)
NEUTROPHILS # BLD AUTO: 8.71 THOUSANDS/ΜL (ref 1.85–7.62)
NEUTS SEG NFR BLD AUTO: 85 % (ref 43–75)
NITRITE UR QL STRIP: NEGATIVE
NRBC BLD AUTO-RTO: 0 /100 WBCS
PH UR STRIP.AUTO: 5.5 [PH]
PLATELET # BLD AUTO: 124 THOUSANDS/UL (ref 149–390)
PMV BLD AUTO: 9.6 FL (ref 8.9–12.7)
POTASSIUM SERPL-SCNC: 3.7 MMOL/L (ref 3.5–5.3)
PROT UR STRIP-MCNC: NEGATIVE MG/DL
RBC # BLD AUTO: 3.64 MILLION/UL (ref 3.88–5.62)
SODIUM SERPL-SCNC: 138 MMOL/L (ref 135–147)
SP GR UR STRIP.AUTO: 1.03 (ref 1–1.03)
UROBILINOGEN UR STRIP-ACNC: <2 MG/DL
WBC # BLD AUTO: 10.29 THOUSAND/UL (ref 4.31–10.16)

## 2022-09-06 PROCEDURE — 94640 AIRWAY INHALATION TREATMENT: CPT

## 2022-09-06 PROCEDURE — 83605 ASSAY OF LACTIC ACID: CPT

## 2022-09-06 PROCEDURE — 99222 1ST HOSP IP/OBS MODERATE 55: CPT | Performed by: INTERNAL MEDICINE

## 2022-09-06 PROCEDURE — 99232 SBSQ HOSP IP/OBS MODERATE 35: CPT | Performed by: HOSPITALIST

## 2022-09-06 PROCEDURE — 99232 SBSQ HOSP IP/OBS MODERATE 35: CPT | Performed by: INTERNAL MEDICINE

## 2022-09-06 PROCEDURE — 94664 DEMO&/EVAL PT USE INHALER: CPT

## 2022-09-06 PROCEDURE — 94760 N-INVAS EAR/PLS OXIMETRY 1: CPT

## 2022-09-06 PROCEDURE — 85025 COMPLETE CBC W/AUTO DIFF WBC: CPT

## 2022-09-06 PROCEDURE — 97116 GAIT TRAINING THERAPY: CPT

## 2022-09-06 PROCEDURE — 81003 URINALYSIS AUTO W/O SCOPE: CPT

## 2022-09-06 PROCEDURE — 80048 BASIC METABOLIC PNL TOTAL CA: CPT

## 2022-09-06 PROCEDURE — 97530 THERAPEUTIC ACTIVITIES: CPT

## 2022-09-06 RX ORDER — HYDROXYZINE HYDROCHLORIDE 25 MG/1
25 TABLET, FILM COATED ORAL EVERY 6 HOURS PRN
Status: CANCELLED | OUTPATIENT
Start: 2022-09-06

## 2022-09-06 RX ORDER — LANOLIN ALCOHOL/MO/W.PET/CERES
3 CREAM (GRAM) TOPICAL
Status: DISCONTINUED | OUTPATIENT
Start: 2022-09-06 | End: 2022-09-12

## 2022-09-06 RX ORDER — METHYLPREDNISOLONE SODIUM SUCCINATE 40 MG/ML
40 INJECTION, POWDER, LYOPHILIZED, FOR SOLUTION INTRAMUSCULAR; INTRAVENOUS EVERY 12 HOURS SCHEDULED
Status: DISCONTINUED | OUTPATIENT
Start: 2022-09-06 | End: 2022-09-09

## 2022-09-06 RX ADMIN — ENOXAPARIN SODIUM 40 MG: 40 INJECTION SUBCUTANEOUS at 08:42

## 2022-09-06 RX ADMIN — LEVALBUTEROL HYDROCHLORIDE 1.25 MG: 1.25 SOLUTION, CONCENTRATE RESPIRATORY (INHALATION) at 07:11

## 2022-09-06 RX ADMIN — BUSPIRONE HYDROCHLORIDE 10 MG: 5 TABLET ORAL at 21:02

## 2022-09-06 RX ADMIN — IPRATROPIUM BROMIDE 0.5 MG: 0.5 SOLUTION RESPIRATORY (INHALATION) at 13:39

## 2022-09-06 RX ADMIN — BUDESONIDE 0.5 MG: 0.5 INHALANT ORAL at 19:48

## 2022-09-06 RX ADMIN — ROFLUMILAST 500 MCG: 250 TABLET ORAL at 08:43

## 2022-09-06 RX ADMIN — GABAPENTIN 300 MG: 300 CAPSULE ORAL at 08:41

## 2022-09-06 RX ADMIN — BUSPIRONE HYDROCHLORIDE 10 MG: 5 TABLET ORAL at 16:12

## 2022-09-06 RX ADMIN — FORMOTEROL FUMARATE DIHYDRATE 20 MCG: 20 SOLUTION RESPIRATORY (INHALATION) at 19:48

## 2022-09-06 RX ADMIN — FINASTERIDE 5 MG: 5 TABLET, FILM COATED ORAL at 08:42

## 2022-09-06 RX ADMIN — METHYLPREDNISOLONE SODIUM SUCCINATE 40 MG: 40 INJECTION, POWDER, FOR SOLUTION INTRAMUSCULAR; INTRAVENOUS at 21:02

## 2022-09-06 RX ADMIN — FORMOTEROL FUMARATE DIHYDRATE 20 MCG: 20 SOLUTION RESPIRATORY (INHALATION) at 07:17

## 2022-09-06 RX ADMIN — GABAPENTIN 300 MG: 300 CAPSULE ORAL at 21:02

## 2022-09-06 RX ADMIN — GABAPENTIN 300 MG: 300 CAPSULE ORAL at 16:12

## 2022-09-06 RX ADMIN — Medication 3 MG: at 21:02

## 2022-09-06 RX ADMIN — AMLODIPINE BESYLATE 10 MG: 10 TABLET ORAL at 08:41

## 2022-09-06 RX ADMIN — CHLORTHALIDONE 25 MG: 25 TABLET ORAL at 08:42

## 2022-09-06 RX ADMIN — METHYLPREDNISOLONE SODIUM SUCCINATE 40 MG: 40 INJECTION, POWDER, FOR SOLUTION INTRAMUSCULAR; INTRAVENOUS at 03:11

## 2022-09-06 RX ADMIN — BUSPIRONE HYDROCHLORIDE 10 MG: 5 TABLET ORAL at 08:41

## 2022-09-06 RX ADMIN — IPRATROPIUM BROMIDE 0.5 MG: 0.5 SOLUTION RESPIRATORY (INHALATION) at 07:11

## 2022-09-06 RX ADMIN — LEVALBUTEROL HYDROCHLORIDE 1.25 MG: 1.25 SOLUTION, CONCENTRATE RESPIRATORY (INHALATION) at 13:39

## 2022-09-06 RX ADMIN — BUDESONIDE 0.5 MG: 0.5 INHALANT ORAL at 07:11

## 2022-09-06 RX ADMIN — TAMSULOSIN HYDROCHLORIDE 0.4 MG: 0.4 CAPSULE ORAL at 08:41

## 2022-09-06 RX ADMIN — LEVALBUTEROL HYDROCHLORIDE 1.25 MG: 1.25 SOLUTION, CONCENTRATE RESPIRATORY (INHALATION) at 19:48

## 2022-09-06 RX ADMIN — MIRTAZAPINE 30 MG: 15 TABLET, FILM COATED ORAL at 21:02

## 2022-09-06 RX ADMIN — IPRATROPIUM BROMIDE 0.5 MG: 0.5 SOLUTION RESPIRATORY (INHALATION) at 19:48

## 2022-09-06 NOTE — PROGRESS NOTES
[facility]  Progress Note - Kathia Sweet 1945, 65 y o  male MRN: [id number]  Unit/Bed#: S -01 Encounter: [id number]  Primary Care Provider: Radames Bales MD   Date and time admitted to hospital: 9/3/2022  1:55 PM    * COPD with acute exacerbation [facility])  Assessment & Plan  Patient returning to the hospital for another severe exacerbation of his COPD  Follows with  Pulmonary Yasmine Jessica  Last admission 8/18 for exacerbation of COPD and was on tapered dose of prednisone and required azithromycin  Several readmissions in the past 6 months for COPD exacerbation  Workup for this admission shows no evidence of infection  WBC normal, chest x-ray no evidence of cardiopulmonary disease, COVID/flu/RSV negative    Plan  · Continue IV Solu-Medrol 40 mg every 8 hours  · Atrovent/Xopenex q i d  · Continue performist and Pulmicort  · Respiratory protocol, airway clearance, antitussives and incentive spirometry    · Appreciate Pulmonology recommendations  · Daliresp 500 mg daily   · Procal negative x2, No antibiotic requirements at this time  · Pulmonary rehab  · Palliative care consulted; [address] discussion given severe COPD with recurrent admissions      Acute on chronic respiratory failure ([facility] [address] )  Assessment & Plan    · Chronically on 3 L oxygen secondary to centrilobular emphysema and alpha 1 antitrypsin deficiency  · Chest x-ray with no evidence of cardiopulmonary disease  · Monitor oxygen requirements and keep SpO2 over 88%  · Currently on 3-4 L NC  · Continue incentive spirometry  · Treatment for COPD exacerbation as above  · Pulmonology on board      Ambulatory dysfunction  Assessment & Plan  · Multifactorial due to age, Parkinson's disease, peripheral neuropathy, chronic medical conditions   · Recently discharged from rehab, lives at home by himself, uses a walker  · Fall precautions  · PT/OT    Lactic acidosis  Assessment & Plan  · Low suspicion for sepsis  · 2/2 increased respiratory muscle use, deconditioning  · Received fluids overnight  · Trend levels until normal    SIRS (systemic inflammatory response syndrome) ([facility] [address] )  Assessment & Plan  · Patient presented to the ED with tachycardia and tachypnea  · No fever, no evidence of infection at this time  · White blood cell normal, procalcitonin normal, blood cultures pending    Parkinson disease ([facility] )  Assessment & Plan  Continue sinemet    Depression, recurrent (HCC)  Assessment & Plan  Continue Buspar 10 mg tid and mirtazapine 30 mg    Chronic pulmonary embolism ([facility] )  Assessment & Plan  · Per chart review, patient completed 6-month course of Eliquis per oncology in 2018  If patient develops another acute PE then will need lifelong AC  · No need for systemic anticoagulation at this time      Essential hypertension  Assessment & Plan  Continue home medications  BP stable    Alpha-1-antitrypsin deficiency (HCC)  Assessment & Plan  · On Zemaira infusion per pulmonology  · Unable to get his infusions for the past 1 month because he was hospitalized and then had rehab             VTE Pharmacologic Prophylaxis: VTE Score: 11 High Risk (Score >/= 5) - Pharmacological DVT Prophylaxis Ordered: enoxaparin (Lovenox)  Sequential Compression Devices Ordered  Patient Centered Rounds: I performed bedside rounds with nursing staff today  Discussions with Specialists or Other Care Team Provider: yes    Education and Discussions with Family / Patient: Updated  (daughter) via phone  Current Length of Stay: 3 day(s)  Current Patient Status: Inpatient   Discharge Plan: Anticipate discharge in 48 hrs to rehab facility  Code Status: Level 1 - Full Code    Subjective:   Patient seen this morning, sitting on his bed  Conscious and alert, with dyspnea on exertion  Looks better than yesterday and can speak in lynch sentences  He has tremors, no headaches, chest pain or shortness of bread   He complains of insomnia, and some burning in urination, increased frequency as well  Patient looks very optimistic and hopes he can return back to his home and doing his activities  Case management working on getting facility after discharge  Objective:     Vitals:   Temp (24hrs), Av 6 °F (36 4 °C), Min:97 1 °F (36 2 °C), Max:98 °F (36 7 °C)    Temp:  [97 1 °F (36 2 °C)-98 °F (36 7 °C)] 97 8 °F (36 6 °C)  HR:  [] 82  Resp:  [18] 18  BP: (130-168)/(65-81) 130/68  SpO2:  [95 %-97 %] 95 %  There is no height or weight on file to calculate BMI  Input and Output Summary (last 24 hours): Intake/Output Summary (Last 24 hours) at 2022 5101  Last data filed at 2022 0002  Gross per 24 hour   Intake 600 ml   Output 1425 ml   Net -825 ml       Physical Exam:   Physical Exam  Constitutional:       Appearance: Normal appearance  HENT:      Head: Normocephalic and atraumatic  Nose: No congestion  Eyes:      General: No scleral icterus  Conjunctiva/sclera: Conjunctivae normal    Cardiovascular:      Rate and Rhythm: Normal rate and regular rhythm  Heart sounds: No murmur heard  Abdominal:      Tenderness: There is no abdominal tenderness  There is no guarding  Musculoskeletal:      Cervical back: Neck supple  Skin:     General: Skin is warm and dry  Neurological:      General: No focal deficit present  Mental Status: He is alert and oriented to person, place, and time  Mental status is at baseline  Psychiatric:         Mood and Affect: Mood normal          Additional Data:     Labs:  Results from last 7 days   Lab Units 22  0426 22  1454   WBC Thousand/uL 9 82   < > 8 08   HEMOGLOBIN g/dL 10 6*   < > 12 8   HEMATOCRIT % 29 8*   < > 35 8*   PLATELETS Thousands/uL 118*   < > 113*   NEUTROS PCT %  --   --  82*   LYMPHS PCT %  --   --  12*   MONOS PCT %  --   --  4   EOS PCT %  --   --  1    < > = values in this interval not displayed       Results from last 7 days   Lab Units 22 09/04/22  0426 09/03/22  1454   SODIUM mmol/L 138   < > 137   POTASSIUM mmol/L 3 6   < > 3 2*   CHLORIDE mmol/L 103   < > 97   CO2 mmol/L 25   < > 30   BUN mg/dL 27*   < > 29*   CREATININE mg/dL 1 03   < > 1 13   ANION GAP mmol/L 10   < > 10   CALCIUM mg/dL 8 3*   < > 9 1   ALBUMIN g/dL  --   --  4 1   TOTAL BILIRUBIN mg/dL  --   --  1 33*   ALK PHOS U/L  --   --  61   ALT U/L  --   --  32   AST U/L  --   --  21   GLUCOSE RANDOM mg/dL 153*   < > 142*    < > = values in this interval not displayed  Results from last 7 days   Lab Units 09/04/22  0426   INR  1 03             Results from last 7 days   Lab Units 09/05/22  0932 09/04/22  1733 09/04/22  1357 09/04/22  0426 09/03/22  1658 09/03/22  1454   LACTIC ACID mmol/L 6 0* 5 7* 5 1*  --  3 6* 2 8*   PROCALCITONIN ng/ml  --   --   --  0 13  --  0 15       Lines/Drains:  Invasive Devices  Report    Peripheral Intravenous Line  Duration           Peripheral IV 09/05/22 Distal;Dorsal (posterior); Left Forearm <1 day                      Imaging: No pertinent imaging reviewed  Recent Cultures (last 7 days):   Results from last 7 days   Lab Units 09/03/22  1454   BLOOD CULTURE  No Growth at 48 hrs  No Growth at 48 hrs         Last 24 Hours Medication List:   Current Facility-Administered Medications   Medication Dose Route Frequency Provider Last Rate    albuterol  2 5 mg Nebulization Q4H PRN Jory Blevins MD      amLODIPine  10 mg Oral Daily Jory Blevins MD      budesonide  0 5 mg Nebulization Q12H Jory Blevins MD      busPIRone  10 mg Oral TID Jory Blevins MD      chlorthalidone  25 mg Oral Daily Jory Blevins MD      enoxaparin  40 mg Subcutaneous Daily Jory Blevins MD      finasteride  5 mg Oral QAM Caity Briceno MD      formoterol  20 mcg Nebulization Q12H Jory Blevins MD      gabapentin  300 mg Oral TID Linda Paz MD  ipratropium  0 5 mg Nebulization TID Violet Petit MD      levalbuterol  1 25 mg Nebulization TID Violet Petit MD      methylPREDNISolone sodium succinate  40 mg Intravenous Q8H Violet Petit MD      mirtazapine  30 mg Oral HS Violet Petit MD      roflumilast  500 mcg Oral Daily Monikauvmichelle Elliott MD      tamsulosin  0 4 mg Oral Daily Violet Petit MD          Today, Patient Was Seen By: Violet Petit MD    **Please Note: This note may have been constructed using a voice recognition system  **

## 2022-09-06 NOTE — PHYSICAL THERAPY NOTE
PHYSICAL THERAPY NOTE          Patient Name: Carleen DE LA CRUZU Date: 22 1535   PT Last Visit   PT Visit Date 22   Note Type   Note Type Treatment   Pain Assessment   Pain Assessment Tool 0-10   Pain Score No Pain   Pain Location/Orientation Orientation: Bilateral;Location: Leg   Pain Onset/Description Onset: Gradual   Effect of Pain on Daily Activities limited functional mobility, activity tolerance and ambulation distance   Patient's Stated Pain Goal No pain   Hospital Pain Intervention(s) Repositioned; Rest   Multiple Pain Sites No   Restrictions/Precautions   Weight Bearing Precautions Per Order No   Other Precautions Chair Alarm; Bed Alarm;O2;Multiple lines; Fall Risk  (3L NC 02, pre tx session Sp02 97%, active 93%, post tx session 97%)   General   Response to Previous Treatment Patient with no complaints from previous session  Cognition   Overall Cognitive Status Impaired   Attention Attends with cues to redirect   Orientation Level Oriented to person;Oriented to place   Memory Decreased recall of precautions   Following Commands Follows one step commands with increased time or repetition   Comments pt idnetified by name and    Subjective   Subjective pt was agreeable to participate in pt intervention   Bed Mobility   Rolling R Unable to assess   Rolling L Unable to assess   Supine to Sit Unable to assess   Sit to Supine Unable to assess   Additional Comments pt began tx session seated in recliner and was agreeable to participate in PT intervention   Transfers   Sit to Stand 4  Minimal assistance   Additional items Assist x 1; Armrests; Increased time required;Verbal cues  (w/ RW)   Stand to Sit 4  Minimal assistance   Additional items Assist x 1; Armrests; Increased time required;Verbal cues  (w/ RW)   Stand pivot 4  Minimal assistance   Additional items Assist x 1; Armrests; Increased time required Additional Comments pt continues to require RW to complete all functional transfers safely in todays tx session   Ambulation/Elevation   Gait pattern Improper Weight shift;Decreased foot clearance; Foward flexed; Short stride; Excessively slow;Decreased heel strike   Gait Assistance 4  Minimal assist   Additional items Assist x 1;Verbal cues; Tactile cues   Assistive Device Rolling walker   Distance 30'x1 RW   Ambulation/Elevation Additional Comments pt was limited in [facility] tx session due to fatigue and SOB   Balance   Static Sitting Fair   Dynamic Sitting Fair -   Static Standing Poor +   Dynamic Standing Poor +   Ambulatory Poor +  (w/ RW)   Endurance Deficit   Endurance Deficit Yes   Endurance Deficit Description limited functional mobility, activity tolerance and ambulation distance   Activity Tolerance   Activity Tolerance Patient limited by fatigue; Other (Comment)  (SOB)   Nurse Made Aware Spoke to RN   Exercises   Knee AROM Long Arc Quad Sitting;10 reps;AROM; Bilateral   Ankle Pumps Sitting;20 reps;AROM; Bilateral   Marching Sitting;10 reps;AROM; Bilateral   Balance training  pt required a therapeutic seated rest break after completing TE activities seated in the recliner   Assessment   Prognosis Fair   Problem List Decreased strength;Decreased endurance; Impaired balance;Decreased mobility; Impaired judgement;Decreased safety awareness   Assessment pt began tx session seated OOB in the recliner and wass asgreeable to participate in PT intervention  pt continues to require min Ax1 for all functional transfers to and from rW with VC's for hand placement for safety and balance  pt continues to be limited with functional mobility, activity tolerance and ambulation distance as pt continues to demonstrate the inability to ambulate house hold distance w/o requiring a therapeutic seated rest break after 30'x1 RWof ambulation   pt continues to remain at an increased risk for falls and injuries to due fatigue and SOB with limited ambulation distance  Continue to recommend post acute rehab services at the time of D/C in order to maximize pt functional independenca nd safety with all OOB mobility   Barriers to Discharge Inaccessible home environment;Decreased caregiver support   Goals   Patient Goals to get some rest   STG Expiration Date 09/15/22   PT Treatment Day 1   Plan   Treatment/Interventions Functional transfer training;LE strengthening/ROM; Elevations; Therapeutic exercise; Endurance training;Patient/family training;Bed mobility;Gait training;Spoke to nursing   PT Frequency 3-5x/wk   Recommendation   PT Discharge Recommendation Post acute rehabilitation services   Equipment Recommended [facility] Recommended Wheeled walker   AM-PAC Basic Mobility Inpatient   Turning in Bed Without Bedrails 3   Lying on Back to Sitting on Edge of Flat Bed 3   Moving Bed to Chair 3   Standing Up From Chair 3   Walk in Room 3   Climb 3-5 Stairs 1   Basic Mobility Inpatient Raw Score 16   Basic Mobility Standardized Score 38 32   Highest Level Of Mobility   JH-HLM Goal 5: Stand one or more mins   JH-HLM Achieved 7: Walk 25 feet or more   Education   Education Provided Mobility training;Assistive device   Patient Reinforcement needed   End of Consult   Patient Position at End of Consult Bedside chair; All needs within reach;Bed/Chair alarm activated   The patient's AM-PAC Basic Mobility Inpatient Short Form Raw Score is 16  A Raw score of less than or equal to 16 suggests the patient may benefit from discharge to post-acute rehabilitation services  Please also refer to the recommendation of the Physical Therapist for safe discharge planning       Audelia Mayer PTA

## 2022-09-06 NOTE — CONSULTS
Consultation - Palliative and Supportive Care   Carleen Esquivel 65 y o  male [id number]    Patient Active Problem List   Diagnosis    Alpha-1-antitrypsin deficiency ([place] [address] )    Gastroesophageal reflux disease    Essential hypertension    BPH (benign prostatic hyperplasia)    Liver disease    Former smoker    [facility] embolism ([facility] )    Acute on chronic respiratory failure ([facility] )    CLAYTON (acute kidney injury) ([place] [address] )    Centrilobular emphysema (HCC)    Chronic venous insufficiency    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 (HCC)    Insomnia    Ambulatory dysfunction    Allergic rhinitis    Benign colon polyp    Cataracts, both eyes    Chronic diarrhea     Peripheral neuropathy    COPD with acute exacerbation (HCC)    Orthostatic hypotension with spine hypertension    Palliative care patient    Vitamin D deficiency disease    Avascular necrosis of hip, right (HCC)    Depression, recurrent ([place] [address] )    Anxiety    Parkinson disease ([place] [address] )    Cognitive impairment    Hypertensive urgency    Chest heaviness    H/O cardiac catheterization    Panlobular emphysema (HCC)    SIRS (systemic inflammatory response syndrome) (HCC)    Lactic acidosis     Active issues specifically addressed today include:   Very severe COPD  Parkinson's disease  Ambulatory dysfunction  Mild cognitive impairment  Depression due to medical illness  Goals of care  Palliative care patient      Plan:  1  Symptom management -    -add melatonin HS, please avoid interruptions overnight   -Patient at risk for delirium given age and co-morbidities, recommend global delirium precautions as follows:  Establishment of day/night cycle via lights during the day and blinds open  Please limit interruptions at night as medically appropriate  Patient should be out of bed during the day as tolerated or medically indicated  Provide glasses/hearing aids as appropriate  Minimize deliriogenic meds as able   Provide reorientation including date on board and visible clock  Avoid restraints as able, frequent verbal reorientations or patient care sitter as appropriate  Consider use of melatonin qHS for circadian rhythm maintenance  2  Goals - evolving  Will plan for ongoing conversations  Patient has not completed advance directive at this time will continue to  on the importance of this specifically in the being a decision maker if he is unable to make his own medical decisions  Patient was fairly consistent that his goals are to return home and regain function  3  Psychosocial support - time spent providing supportive listening       4  PSC follow-up- will follow during hospitalization  Also will ask for expedited follow-up with our outpatient clinic  Code Status:  Full - Level 1   Decisional apparatus:  Patient is competent on my exam today  If competence is lost, patient's substitute decision maker would default to children by [place] 169  Advance Directive / Living Will / POLST:  No     I have reviewed the patient's controlled substance dispensing history in the Prescription Drug Monitoring Program in compliance with the [facility] regulations before prescribing any controlled substances  We appreciate the invitation to be involved in this patient's care  We will follow  Please do not hesitate to reach our on call provider through our clinic answering service at  should you have acute symptom control concerns  Toma Packer DO  Palliative and Supportive Care  Clinic/Answering Service: [phone]  You can find me on [employer]        IDENTIFICATION:  Inpatient consult to Palliative Care  Consult performed by: Toma Packer DO  Consult ordered by: Jorge L Watts MD        Physician Requesting Consult: Kimberly Ludwig MD  Reason for Consult / Principal Problem:  Goals of care  Hx and PE limited by:  None    HISTORY OF PRESENT ILLNESS:       Austen Dorantes is a 65 y o  male who presented on September 3rd for weakness and shortness of breath  Patient is known to palliative care as he follows with Dr Reta Burger for underlying diagnosis of very severe COPD and A1AT deficiency  Patient had been recently hospitalized for COPD exacerbation and had returned home  He noted that he was weak and sustained a fall from standing and called 911 for assistance  He was not wanting to go to the hospital he just wanted assistance in standing however paramedics thought he needed to be evaluated given his dyspnea  Patient seen today with palliative care social work  He is able to tell us that he came to the hospital after sustaining a fall, however, was hopeful not to go the emergency room as he only wanted help standing again  He does live alone with poor support  His daughter provides him with groceries weekly otherwise he has no other support  He notes that he misses driving and going to the club to play pool and shuffleboard  He does admit that he has had not ongoing functional decline over the past months  He does not have an Advance directive at this time, will continue to  importance of this  Patient states that he will go to short-term rehab but he will not consent to any long-term care  He wants to remain in his home and maintain his independence  Given his decline he was voluntarily stopped driving, as he does worry that with his tremors he is in danger behind the wheel  Patient has 3 children, the provide minimal support  He is retired manager at [employer]  He never served in the [employer]  He enjoys playing pool and [employer] board  Only somatic complaint here at the hospital is trouble sleeping  Review of Systems   Cardiovascular: Positive for dyspnea on exertion  Neurological: Positive for disturbances in coordination, excessive daytime sleepiness and tremors  All other systems reviewed and are negative        Past Medical History: Diagnosis Date    Anesthesia complication     Difficult to wake up    Arthritis     BPH (benign prostatic hyperplasia)     urinary frequency    Cancer (HCC)     basal cell neck, face    CKD (chronic kidney disease) stage 2, GFR 60-89 ml/min 07/04/2022    COPD (chronic obstructive pulmonary disease) (HCC)     COPD exacerbation (HCC) 12/29/2019    Coronary artery disease     Elevated PSA 10/25/2018    Full dentures     Hiatal hernia     History of methicillin resistant staphylococcus aureus (MRSA)     10/11/2018 MRSA (nares) positive    Hypertension     Irritable bowel syndrome     Kidney stone     at least 7 episodes    Liver disease     Alpha 1- enzyme deficiency - diagnosed 2002  has been on weekly replacement therapy since then    Parkinson disease [facility]     Pulmonary emphysema ([facility] )     1/25/15  FEV1 - 2 45 liters or 59% of predicted    RSV infection 12/2017    S[facility]ic [facility] response syndrome) [facility] 8/14/2022    Wears glasses     for driving only     Past Surgical History:   Procedure Laterality Date    BACK SURGERY  2008    discectomy    CARDIAC CATHETERIZATION N/A 5/3/2022    Procedure: Cardiac catheterization both left and right heart catheterization with vaso dilatory trial;  Surgeon: Maldonado Crespo MD;  Location: [place] [address] CATH LAB; Service: Cardiology    CARDIAC CATHETERIZATION Left 5/3/2022    Procedure: Cardiac Left Heart Cath;  Surgeon: Maldonado Crespo MD;  Location: [place] [address] CATH LAB; Service: Cardiology    CARDIAC CATHETERIZATION Left 5/3/2022    Procedure: Cardiac Left Ventriculogram;  Surgeon: Maldonado Crespo MD;  Location: [place] [address] CATH LAB; Service: Cardiology    COLONOSCOPY      COLONOSCOPY N/A 3/10/2017    Procedure: Bettie Johns;  Surgeon: Carol Gr MD;  Location[facility] GI LAB; Service:    Yudith Fierro      removal of kidney stones    ESOPHAGOGASTRODUODENOSCOPY N/A 3/10/2017    Procedure: ESOPHAGOGASTRODUODENOSCOPY (EGD);   Surgeon: Rodriguez To MD;  Location[facility] GI LAB; Service:     LITHOTRIPSY      CO ESOPHAGOGASTRODUODENOSCOPY TRANSORAL DIAGNOSTIC N/A 2018    Procedure: ESOPHAGOGASTRODUODENOSCOPY (EGD); Surgeon: Rodriguez To MD;  Location: [facility] GI LAB; Service: Gastroenterology    TONSILLECTOMY      VEIN LIGATION AND STRIPPING Bilateral          Social History     Socioeconomic History    Marital status:      Spouse name: Not on file    Number of children: Not on file    Years of education: Not on file    Highest education level: Not on file   Occupational History    Not on file   Tobacco Use    Smoking status: Former Smoker     Packs/day: 1 00     Years: 60 00     Pack years: 60 00     Quit date: 2017     Years since quittin 0    Smokeless tobacco: Never Used    Tobacco comment: quit in 2017   Vaping Use    Vaping Use: Never used   Substance and Sexual Activity    Alcohol use: Never     Comment: stopped in     Drug use: Not Currently    Sexual activity: Not on file   Other Topics Concern    Not on file   Social History Narrative    Patient is   He has three adult children; 1 daughter and 2 sons  His daughter Kostas Barajas lives closest [place] and is very involved w/ his care  Patient is not Adventist  He lives alone  Social Determinants of Health     Financial Resource Strain: Not on file   Food Insecurity: No Food Insecurity    Worried About Running Out of Food in the Last Year: Never true    Vi of Food in the Last Year: Never true   Transportation Needs: No Transportation Needs    Lack of Transportation (Medical): No    Lack of Transportation (Non-Medical):  No   Physical Activity: Not on file   Stress: Not on file   Social Connections: Not on file   Intimate Partner Violence: Not on file   Housing Stability: Low Risk     Unable to Pay for Housing in the Last Year: No    Number of Places Lived in the Last Year: 1    Unstable Housing in the Last Year: No     Family History   Problem Relation Age of Onset    Emphysema Mother         never smoked    Emphysema Father     Cancer Brother         colon    Colon cancer Brother     Ulcerative colitis Family     Liver disease Family        MEDICATIONS / ALLERGIES:    all current active meds have been reviewed and current meds:   Current Facility-Administered Medications   Medication Dose Route Frequency    albuterol inhalation solution 2 5 mg  2 5 mg Nebulization Q4H PRN    amLODIPine (NORVASC) tablet 10 mg  10 mg Oral Daily    budesonide (PULMICORT) inhalation solution 0 5 mg  0 5 mg Nebulization Q12H    busPIRone (BUSPAR) tablet 10 mg  10 mg Oral TID    chlorthalidone tablet 25 mg  25 mg Oral Daily    enoxaparin (LOVENOX) subcutaneous injection 40 mg  40 mg Subcutaneous Daily    finasteride (PROSCAR) tablet 5 mg  5 mg Oral QAM    formoterol (PERFOROMIST) nebulizer solution 20 mcg  20 mcg Nebulization Q12H    gabapentin (NEURONTIN) capsule 300 mg  300 mg Oral TID    ipratropium (ATROVENT) 0 02 % inhalation solution 0 5 mg  0 5 mg Nebulization TID    levalbuterol (XOPENEX) inhalation solution 1 25 mg  1 25 mg Nebulization TID    methylPREDNISolone sodium succinate (Solu-MEDROL) injection 40 mg  40 mg Intravenous Q8H    mirtazapine (REMERON) tablet 30 mg  30 mg Oral HS    roflumilast (DALIRESP) tablet 500 mcg  500 mcg Oral Daily    tamsulosin (FLOMAX) capsule 0 4 mg  0 4 mg Oral Daily       Allergies   Allergen Reactions    Chantix [Varenicline]      Caused prostate infection       OBJECTIVE:    Physical Exam  Physical Exam  Vitals and nursing note reviewed  Constitutional:       General: He is not in acute distress  Appearance: He is ill-appearing  HENT:      Head: Normocephalic and atraumatic  Right Ear: External ear normal       Left Ear: External ear normal       Nose: Nose normal    Eyes:      General: No scleral icterus  Right eye: No discharge           Left eye: No discharge  Cardiovascular:      Rate and Rhythm: Regular rhythm  Tachycardia present  Pulmonary:      Effort: Pulmonary effort is normal  No respiratory distress  Abdominal:      General: Bowel sounds are normal  There is no distension  Palpations: Abdomen is soft  Skin:     General: Skin is warm and dry  Coloration: Skin is pale  Neurological:      Mental Status: He is alert and oriented to person, place, and time  Comments: Resting tremor noted in both hands   Psychiatric:         Mood and Affect: Mood normal          Behavior: Behavior normal          Thought Content: Thought content normal          Judgment: Judgment normal          Lab Results:   I have personally reviewed pertinent labs  , CBC:   Lab Results   Component Value Date    WBC 10 29 (H) 09/06/2022    HGB 11 9 (L) 09/06/2022    HCT 34 8 (L) 09/06/2022    MCV 96 09/06/2022     (L) 09/06/2022    MCH 32 7 09/06/2022    MCHC 34 2 09/06/2022    RDW 14 9 09/06/2022    MPV 9 6 09/06/2022    NRBC 0 09/06/2022   , CMP:   Lab Results   Component Value Date    SODIUM 138 09/06/2022    K 3 7 09/06/2022     09/06/2022    CO2 27 09/06/2022    BUN 29 (H) 09/06/2022    CREATININE 0 89 09/06/2022    CALCIUM 8 9 09/06/2022    EGFR 81 09/06/2022   , PT/PTT:No results found for: PT, PTT  Imaging Studies:  Reviewed  EKG, Pathology, and Other Studies:  Reviewed    Counseling / Coordination of Care    Total floor / unit time spent today 55+ minutes  Greater than 50% of total time was spent with the patient and / or family counseling and / or coordination of care  A description of the counseling / coordination of care:  Chart review, medication review, goals of care, advanced care planning, supportive listening  Portions of this document may have been created using dictation software and as such some "sound alike" terms may have been generated by the system  Do not hesitate to contact me with any questions or clarifications

## 2022-09-06 NOTE — CASE MANAGEMENT
Case Management Assessment & Discharge Planning Note    Patient name Isreal Koch S /S -01 MRN [id number]  : 1945 Date 2022       Current Admission Date: 9/3/2022  Current Admission Diagnosis:COPD with acute exacerbation [facility])   Patient Active Problem List    Diagnosis Date Noted    S[facility]ic inflammatory response syndrome) [facility] ) 2022    Lactic acidosis 2022    Panlobular emphysema ([facility] )     H/O cardiac catheterization 2022    Chest heaviness 2022    Hypertensive urgency 2022    Cognitive impairment 2021    Parkinson disease ([address] ) 2021    Anxiety 2021    Vitamin D deficiency disease 2021    Avascular necrosis of hip, right ([facility] ) 2021    Depression, recurrent ([facility] ) 2021    Palliative care patient 11/10/2020    Orthostatic hypotension with spine hypertension 2020    COPD with acute exacerbation ([facility] ) 2019    Ambulatory dysfunction 2019    Insomnia 2019    Chronic venous insufficiency 2019    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 (HCC) 2019    Centrilobular emphysema ([facility] ) 2018    CLAYTON (acute kidney injury) ([facility] ) 2018    Acute on chronic respiratory failure ([facility] ) 2018    Chronic pulmonary embolism ([facility] 2018    Former smoker 2018    Liver disease     Alpha-1-antitrypsin deficiency ([facility] ) 2017    Gastroesophageal reflux disease 2017    Essential hypertension 2017    BPH (benign prostatic hyperplasia) 2017    Peripheral neuropathy 2017    Allergic rhinitis 2016    Cataracts, both eyes 2015    Chronic diarrhea  2014    Benign colon polyp 2014      LOS (days): 3  Geometric Mean LOS (GMLOS) (days): 3 60  Days to GMLOS:0 7     OBJECTIVE:  PATIENT READMITTED TO HOSPITAL  Risk of Unplanned Readmission Score: 36 06         Current admission status: Inpatient       Preferred Pharmacy:   [facility], [address]  [address] [place] [address]  Phone: [phone] Fax: [phone]    [id number] [address]  [address] [facility] [address]  Phone: [phone] Fax: [phone]    [address], [address]  [address] [facility] [place] [address]  Phone: [phone] Fax: [phone]    Primary Care Provider: Brittani Reich MD    Primary Insurance: MEDICARE  Secondary Insurance: SHRUTHI MEDINA FERRER    ASSESSMENT:  Rik Becerar, [address] [facility]   Primary Phone: [phone] (Mobile)                         Readmission Root Cause  30 Day Readmission: Yes  Who directed you to return to the hospital?: Self  Did you understand whom to contact if you had questions or problems?: Yes  Did you get your prescriptions before you left the hospital?: No  Reason[de-identified] Unable to get to hospital pharmacy (D/C to SNF)  Were you able to get your prescriptions filled when you left the hospital?: Yes  Did you take your medications as prescribed?: Yes  Were you able to get to your follow-up appointments?: Yes  During previous admission, was a post-acute recommendation made?: Yes  What post-acute resources were offered?: STR  Patient was readmitted due to: Patient was discharged last admission to [address] [place] [address] for [address]  Patient discharge on 9/1 and on 9/3 he fell in his kitchen prompting a return to the hospital   Action Plan: Patient recommended for return to [facility] and he chooses to pursue placement for STR        CM discussed Advance Care Planning with primary provider based on scores of Goals of Care systems list       Patient Information  Admitted from[de-identified] Home  Mental Status: Alert  During Assessment patient was accompanied by: Not accompanied during assessment  Assessment information provided by[de-identified] Patient, Daughter  Primary Caregiver: Self  Support Systems: Self, Daughter  [place] of Residence: [address] do you live in?: [place] entry access options  Select all that apply : Stairs  Number of steps to enter home : 2  Do the steps have railings?: Yes  Type of Current Residence: [place]  In the last 12 months, was there a time when you were not able to pay the mortgage or rent on time?: No  In the last 12 months, how many places have you lived?: 1  In the last 12 months, was there a time when you did not have a steady place to sleep or slept in a shelter (including now)?: No  Homeless/housing insecurity resource given?: N/A  Living Arrangements: Lives Alone    Activities of Daily Living Prior to Admission  Functional Status: Assistance  Completes ADLs independently?: Yes (Has a cleaning person)  Ambulates independently?: Yes  Does patient use assisted devices?: Yes  Assisted Devices (DME) used: Shower Chair, Straight [place], Walker, Home Oxygen concentrator, Portable Oxygen concentrator, Other (Comment) (Raised toilet seat)  DME Company Name (respiratory supplies):  [employer]  O2 Rate(s): 3  Does patient currently own DME?: Yes  What DME does the patient currently own?: Home Oxygen concentrator, Portable Oxygen concentrator, Shower Chair, Straight Cane, Walker  Does patient have a history of Outpatient Therapy (PT/OT)?: No  Does the patient have a history of Short-Term Rehab?: Yes ([facility] [place])  Does patient have a history of HHC?: Yes ([facility])  Does patient currently have [address]?: Yes    Current Home Health Care  Type of Current Home Care Services: Nurse visit, Home OT, [place] [address][de-identified] [address] Provider[de-identified] PCP    Patient Information Continued  Income Source: Pension/residential  Does patient have prescription coverage?: Yes  Within the past 12 months, you worried that your food would run out before you got the money to buy more : Never true  Within the past 12 months, the food you bought just didn't last and you didn't have money to get more : Never true  Food insecurity resource given?: N/A  Does patient receive dialysis treatments?: No         Means of Transportation  Means of Transport to Appts[de-identified] Drives Self (Patient has not really driven since April  States he uses Lyft)  In the past 12 months, has lack of transportation kept you from medical appointments or from getting medications?: No  In the past 12 months, has lack of transportation kept you from meetings, work, or from getting things needed for daily living?: No  Was application for public transport provided?: N/A        DISCHARGE DETAILS:    Discharge planning discussed with[de-identified] Patient and daughter [place] of Choice: Yes  Comments - Freedom of Choice: Discussed rehab recommendation and patient is agreeable  CM contacted family/caregiver?: Yes  Were Treatment Team discharge recommendations reviewed with patient/caregiver?: Yes  Did patient/caregiver verbalize understanding of patient care needs?: Yes  Were patient/caregiver advised of the risks associated with not following Treatment Team discharge recommendations?: Yes    Contacts  Patient Contacts: Don Pel  Relationship to Patient[de-identified] Family  Contact Method: Phone  Reason/Outcome: Discharge Planning, Continuity of Care, Emergency [address]         [facility]?: No    DME Referral Provided  Referral made for DME?: No    Other Referral/Resources/Interventions Provided:  Interventions: Short Term Rehab  Referral Comments: CM met with patient at bedside to introduce self and role  CM reviewed STR recommendation and patient is open to returning to STR  He requests he not [facility] to [facility] but was okay with [employer]   CM reviewed sending blanket referrals with in 15 miles of patient's home to determine availability and he verbalizes approval of this plan  CM reached out to patient's daughter Ellen Moreno to review above and she also requests CM not refer patient to any [place] facility [facility], [facility], and [facility]) and CM verbalizes understanding  CM reviewed remaining medicare days and morales is unsure of how many patient has left out of his initial 100  CM reached out to Loraine Albarran liaison to request she provide how many day patient has left  She states she will contact her business office and let CM know           Treatment Team Recommendation: Short Term Rehab  Discharge Destination Plan[de-identified] Short Term Rehab

## 2022-09-06 NOTE — PLAN OF CARE
Problem: MOBILITY - ADULT  Goal: Maintain or return to baseline ADL function  Description: INTERVENTIONS:  -  Assess patient's ability to carry out ADLs; assess patient's baseline for ADL function and identify physical deficits which impact ability to perform ADLs (bathing, care of mouth/teeth, toileting, grooming, dressing, etc )  - Assess/evaluate cause of self-care deficits   - Assess range of motion  - Assess patient's mobility; develop plan if impaired  - Assess patient's need for assistive devices and provide as appropriate  - Encourage maximum independence but intervene and supervise when necessary  - Involve family in performance of ADLs  - Assess for home care needs following discharge   - Consider OT consult to assist with ADL evaluation and planning for discharge  - Provide patient education as appropriate  Outcome: Progressing  Goal: Maintains/Returns to pre admission functional level  Description: INTERVENTIONS:  - Perform BMAT or MOVE assessment daily    - Set and communicate daily mobility goal to care team and patient/family/caregiver  - Collaborate with rehabilitation services on mobility goals if consulted  - Perform Range of Motion *** times a day  - Reposition patient every *** hours    - Dangle patient *** times a day  - Stand patient *** times a day  - Ambulate patient *** times a day  - Out of bed to chair *** times a day   - Out of bed for meals *** times a day  - Out of bed for toileting  - Record patient progress and toleration of activity level   Outcome: Progressing     Problem: Potential for Falls  Goal: Patient will remain free of falls  Description: INTERVENTIONS:  - Educate patient/family on patient safety including physical limitations  - Instruct patient to call for assistance with activity   - Consult OT/PT to assist with strengthening/mobility   - Keep Call bell within reach  - Keep bed low and locked with side rails adjusted as appropriate  - Keep care items and personal belongings within reach  - Initiate and maintain comfort rounds  - Make Fall Risk Sign visible to staff  - Offer Toileting every *** Hours, in advance of need  - Initiate/Maintain ***alarm  - Obtain necessary fall risk management equipment: ***  - Apply yellow socks and bracelet for high fall risk patients  - Consider moving patient to room near nurses station  Outcome: Progressing

## 2022-09-06 NOTE — SOCIAL WORK
[facility] met with patient/family to introduce the role of Enzo Lu GAY in their care/treatment  Patient/family were given the opportunity to have their questions/concerns addressed  Enzo RASHID encouraged patient/family to reach out to SW as needed for support and/or resources as needed  Palliative Inpatient Assessment Note    LSW appreciates the opportunity to provide patient/family with inpatient emotional support and guidance while they continue to receive medical attention from a Palliative provider  LSW completed an assessment of need which was completed with pt  Relationship status:    Duration of relationship:   Name of significant other:  Children and Ages: 1 dtr Ellen Moreno and 2 sons ([place] and Percy Rico)  Pets:  Other important family information:  Living situation (place and with whom): Resides at home alone    Patient's primary caregiver: Self  Dtr goes grocery shopping weekly for pt    Any limitations:  Environmental concerns or barriers:   history: None  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   Disability:  Spirituality/ Advent: None   Cultural information:   Mental Health and/or Drug and Alcohol history: Pt reports lack of emotional support from family  Advanced Directives: None  Hobbies/Interests: Playing pool; shuffleboard    Patient's strengths, social supports, and resources:   Patient/family current level of coping: Pt tearful during conversation  Appears to be struggling with his decline over the past 2-3 months  Reports he is aware he has "gone downhill"    Level of understanding: Pt appears to have some insight into medical status  Has voluntarily stopped driving despite its importance to him as he is afraid of the possible risk to safety of others  Patient/family concerns and areas of need: Pt admitted to the hospital s/p fall  He is hopeful to regain some strength to eventually be able to return home   Pt is agreeable with dcp to STR but does not want LTC as it is important to him to leave a legacy for his children  I have spent 40 minutes with Patient today in which greater than 50% of this time was spent in counseling/coordination of care regarding SW needs assessment; Emotional Support  *All questions may not be answered due to constraints  Follow-up discussions may need to occur    LSW will follow

## 2022-09-06 NOTE — PROGRESS NOTES
Progress Note - Pulmonary   Algis Lat 65 y o  male MRN: [id number]  Unit/Bed#: S -01 Encounter: [id number]    Assessment/Plan:    1  Acute pulmonary insufficiency on chronic hypoxic respiratory failure       -  currently on home O2 requirements of 3 L-96%       -  maintain saturations greater than 88%       -  pulmonary toileting:  Deep breathing cough, OOB as tolerated, IS Q 1 hr     2  Severe COPD with acute exacerbation       -  still reporting significant shortness of breath, particularly with walking        -  Inpatient: Decreased IV Solu-Medrol from 40 mg t i d  to 40 mg q12h  -  Inpatient: continue budesonide/Perforomist b i d , Xopenex/Atrovent t i d        -  home regimen:  Budesonide/Perforomist b i d , Ventolin 2 puffs Q 4 p r n , Xopenex/Atrovent t i d , Daliresp 250 mg daily       -  continue increased dosing of Daliresp 500 mg daily       -  He states he would be interested in physical rehabilitation, but is concerned he does not have enough time remaining in terms of insurance coverage for rehab  -  He would be a good candidate for inpatient physical rehabilitation with pulmonary follow-up  -  may consider palliative follow-up     3  Alpha-1 antitrypsin deficiency       -  unfortunately patient has missed weekly replacement of therapy with alpha-1 proteinase inhibitor for approximately 1 month       -  patient has medication at home at which [facility] administers     4  H/O PE       -  2018- chronic PE extending from left main into left lower interlobular PA       -  completed 6 month course of anticoagulation       -  if PE recurs will need lifelong anticoagulation      Chief Complaint:   "I still feel short of breath"    Subjective:   No acute events overnight  Patient remarks that he remains short of breath, though at rest on 3 L of oxygen he feels similar to how his at home  Primarily feels short of breath when he is walking    He denies wheezing, chest pains, abdominal pain, nausea or vomiting  Objective:     Vitals: Blood pressure 168/86, pulse (!) 112, temperature (!) 97 3 °F (36 3 °C), resp  rate 18, SpO2 96 %  ,There is no height or weight on file to calculate BMI  Intake/Output Summary (Last 24 hours) at 9/6/2022 1134  Last data filed at 9/6/2022 0900  Gross per 24 hour   Intake --   Output 1600 ml   Net -1600 ml       Invasive Devices  Report    Peripheral Intravenous Line  Duration           Peripheral IV 09/05/22 Distal;Dorsal (posterior); Left Forearm <1 day                Physical Exam: /86   Pulse (!) 112   Temp (!) 97 3 °F (36 3 °C)   Resp 18   SpO2 96%     General Appearance:    Alert, cooperative, no distress, appears stated age   Head:    Normocephalic, without obvious abnormality, atraumatic   Eyes:    PERRL, conjunctiva/corneas clear, both eyes        Ears:    Normal external ear canals, both ears   Nose:   Nares normal, septum midline, mucosa normal, no drainage    or sinus tenderness   Throat:   Lips, mucosa, and tongue normal; teeth and gums normal   Neck:   Supple, symmetrical, trachea midline       Lungs:     Clear to auscultation bilaterally,  overall decreased breath sounds, no wheezing  Chest wall:    No tenderness or deformity   Heart:    Regular rate and rhythm, S1 and S2 normal, no murmur, rub   or gallop   Abdomen:     Distended, tense, rounded, non-tender, bowel sounds active all four quadrants, no masses, no organomegaly   Extremities:   Extremities normal, atraumatic, no cyanosis or edema   Pulses:   2+ and symmetric all extremities   Skin:   Skin color, texture, turgor normal, no rashes or lesions                Labs: I have personally reviewed pertinent lab results  Imaging and other studies: I have personally reviewed pertinent reports

## 2022-09-06 NOTE — ASSESSMENT & PLAN NOTE
Patient returning to the hospital for another severe exacerbation of his COPD  Follows with [facility]  Last admission 8/18 for exacerbation of COPD and was on tapered dose of prednisone and required azithromycin  Several readmissions in the past 6 months for COPD exacerbation  Workup for this admission shows no evidence of infection  WBC normal, chest x-ray no evidence of cardiopulmonary disease, COVID/flu/RSV negative    Plan; pulmonology recomendations appreciated      Will transition to prednisone tomorrow 40 mg decreased by 10 mg q 3 days    · Atrovent/Xopenex q i d  · Continue performist and Pulmicort  · Respiratory protocol, airway clearance, antitussives and incentive spirometry    · Appreciate Pulmonology recommendations  · Daliresp 500 mg daily   · Procal negative x2, No antibiotic requirements at this time  · Pulmonary rehab  · Palliative care consulted; [address] discussion given severe COPD with recurrent admissions

## 2022-09-06 NOTE — ASSESSMENT & PLAN NOTE
Patient returning to the hospital for another severe exacerbation of his COPD  Follows with [facility]  Last admission 8/18 for exacerbation of COPD and was on tapered dose of prednisone and required azithromycin  Several readmissions in the past 6 months for COPD exacerbation  Workup for this admission shows no evidence of infection  WBC normal, chest x-ray no evidence of cardiopulmonary disease, COVID/flu/RSV negative    Plan  · Continue IV Solu-Medrol 40 mg every 8 hours  · Atrovent/Xopenex q i d  · Continue performist and Pulmicort  · Respiratory protocol, airway clearance, antitussives and incentive spirometry    · Appreciate Pulmonology recommendations  · Daliresp 500 mg daily   · Procal negative x2, No antibiotic requirements at this time  · Pulmonary rehab  · Palliative care consulted; [address] discussion given severe COPD with recurrent admissions

## 2022-09-06 NOTE — PLAN OF CARE
Problem: PHYSICAL THERAPY ADULT  Goal: Performs mobility at highest level of function for planned discharge setting  See evaluation for individualized goals  Description: Treatment/Interventions: Functional transfer training, LE strengthening/ROM, Elevations, Therapeutic exercise, Endurance training, Patient/family training, Equipment eval/education, Bed mobility, Gait training  Equipment Recommended: Rodgers Cranker       See flowsheet documentation for full assessment, interventions and recommendations  Outcome: Progressing  Note: Prognosis: Fair  Problem List: Decreased strength, Decreased endurance, Impaired balance, Decreased mobility, Impaired judgement, Decreased safety awareness  Assessment: pt began tx session seated OOB in the recliner and wass asgreeable to participate in PT intervention  pt continues to require min Ax1 for all functional transfers to and from rW with VC's for hand placement for safety and balance  pt continues to be limited with functional mobility, activity tolerance and ambulation distance as pt continues to demonstrate the inability to ambulate house hold distance w/o requiring a therapeutic seated rest break after 30'x1 RWof ambulation  pt continues to remain at an increased risk for falls and injuries to due fatigue and SOB with limited ambulation distance  Continue to recommend post acute rehab services at the time of D/C in order to maximize pt functional independenca nd safety with all OOB mobility  Barriers to Discharge: Inaccessible home environment, Decreased caregiver support     PT Discharge Recommendation: Post acute rehabilitation services    See flowsheet documentation for full assessment

## 2022-09-07 ENCOUNTER — APPOINTMENT (INPATIENT)
Dept: CT IMAGING | Facility: HOSPITAL | Age: 77
DRG: 190 | End: 2022-09-07
Payer: MEDICARE

## 2022-09-07 LAB
ANION GAP SERPL CALCULATED.3IONS-SCNC: 10 MMOL/L (ref 4–13)
ATRIAL RATE: 86 BPM
BASOPHILS # BLD MANUAL: 0 THOUSAND/UL (ref 0–0.1)
BASOPHILS NFR MAR MANUAL: 0 % (ref 0–1)
BUN SERPL-MCNC: 29 MG/DL (ref 5–25)
CALCIUM SERPL-MCNC: 9.3 MG/DL (ref 8.4–10.2)
CHLORIDE SERPL-SCNC: 101 MMOL/L (ref 96–108)
CO2 SERPL-SCNC: 28 MMOL/L (ref 21–32)
CREAT SERPL-MCNC: 0.9 MG/DL (ref 0.6–1.3)
EOSINOPHIL # BLD MANUAL: 0 THOUSAND/UL (ref 0–0.4)
EOSINOPHIL NFR BLD MANUAL: 0 % (ref 0–6)
ERYTHROCYTE [DISTWIDTH] IN BLOOD BY AUTOMATED COUNT: 15.1 % (ref 11.6–15.1)
GFR SERPL CREATININE-BSD FRML MDRD: 81 ML/MIN/1.73SQ M
GLUCOSE SERPL-MCNC: 140 MG/DL (ref 65–140)
HCT VFR BLD AUTO: 37.7 % (ref 36.5–49.3)
HGB BLD-MCNC: 13.2 G/DL (ref 12–17)
LYMPHOCYTES # BLD AUTO: 1.82 THOUSAND/UL (ref 0.6–4.47)
LYMPHOCYTES # BLD AUTO: 18 % (ref 14–44)
MCH RBC QN AUTO: 33.8 PG (ref 26.8–34.3)
MCHC RBC AUTO-ENTMCNC: 35 G/DL (ref 31.4–37.4)
MCV RBC AUTO: 97 FL (ref 82–98)
MONOCYTES # BLD AUTO: 0.3 THOUSAND/UL (ref 0–1.22)
MONOCYTES NFR BLD: 3 % (ref 4–12)
NEUTROPHILS # BLD MANUAL: 7.99 THOUSAND/UL (ref 1.85–7.62)
NEUTS BAND NFR BLD MANUAL: 1 % (ref 0–8)
NEUTS SEG NFR BLD AUTO: 78 % (ref 43–75)
PLATELET # BLD AUTO: 150 THOUSANDS/UL (ref 149–390)
PLATELET BLD QL SMEAR: ADEQUATE
PMV BLD AUTO: 10.2 FL (ref 8.9–12.7)
POTASSIUM SERPL-SCNC: 4.3 MMOL/L (ref 3.5–5.3)
QRS AXIS: 65 DEGREES
QRSD INTERVAL: 96 MS
QT INTERVAL: 416 MS
QTC INTERVAL: 488 MS
RBC # BLD AUTO: 3.9 MILLION/UL (ref 3.88–5.62)
RBC MORPH BLD: NORMAL
SODIUM SERPL-SCNC: 139 MMOL/L (ref 135–147)
T WAVE AXIS: 46 DEGREES
VENTRICULAR RATE: 83 BPM
WBC # BLD AUTO: 10.12 THOUSAND/UL (ref 4.31–10.16)

## 2022-09-07 PROCEDURE — 99232 SBSQ HOSP IP/OBS MODERATE 35: CPT | Performed by: HOSPITALIST

## 2022-09-07 PROCEDURE — G1004 CDSM NDSC: HCPCS

## 2022-09-07 PROCEDURE — 80048 BASIC METABOLIC PNL TOTAL CA: CPT

## 2022-09-07 PROCEDURE — 85027 COMPLETE CBC AUTOMATED: CPT

## 2022-09-07 PROCEDURE — 71275 CT ANGIOGRAPHY CHEST: CPT

## 2022-09-07 PROCEDURE — 94760 N-INVAS EAR/PLS OXIMETRY 1: CPT

## 2022-09-07 PROCEDURE — 94668 MNPJ CHEST WALL SBSQ: CPT

## 2022-09-07 PROCEDURE — 85007 BL SMEAR W/DIFF WBC COUNT: CPT

## 2022-09-07 PROCEDURE — 94640 AIRWAY INHALATION TREATMENT: CPT

## 2022-09-07 PROCEDURE — 99232 SBSQ HOSP IP/OBS MODERATE 35: CPT | Performed by: NURSE PRACTITIONER

## 2022-09-07 PROCEDURE — 97167 OT EVAL HIGH COMPLEX 60 MIN: CPT

## 2022-09-07 PROCEDURE — 93010 ELECTROCARDIOGRAM REPORT: CPT | Performed by: INTERNAL MEDICINE

## 2022-09-07 PROCEDURE — 99232 SBSQ HOSP IP/OBS MODERATE 35: CPT | Performed by: INTERNAL MEDICINE

## 2022-09-07 RX ADMIN — AMLODIPINE BESYLATE 10 MG: 10 TABLET ORAL at 08:16

## 2022-09-07 RX ADMIN — GABAPENTIN 300 MG: 300 CAPSULE ORAL at 15:49

## 2022-09-07 RX ADMIN — METHYLPREDNISOLONE SODIUM SUCCINATE 40 MG: 40 INJECTION, POWDER, FOR SOLUTION INTRAMUSCULAR; INTRAVENOUS at 21:13

## 2022-09-07 RX ADMIN — IPRATROPIUM BROMIDE 0.5 MG: 0.5 SOLUTION RESPIRATORY (INHALATION) at 19:57

## 2022-09-07 RX ADMIN — GABAPENTIN 300 MG: 300 CAPSULE ORAL at 08:16

## 2022-09-07 RX ADMIN — MIRTAZAPINE 30 MG: 15 TABLET, FILM COATED ORAL at 21:12

## 2022-09-07 RX ADMIN — TAMSULOSIN HYDROCHLORIDE 0.4 MG: 0.4 CAPSULE ORAL at 08:16

## 2022-09-07 RX ADMIN — FORMOTEROL FUMARATE DIHYDRATE 20 MCG: 20 SOLUTION RESPIRATORY (INHALATION) at 19:56

## 2022-09-07 RX ADMIN — BUSPIRONE HYDROCHLORIDE 10 MG: 5 TABLET ORAL at 08:16

## 2022-09-07 RX ADMIN — ROFLUMILAST 500 MCG: 250 TABLET ORAL at 08:17

## 2022-09-07 RX ADMIN — GABAPENTIN 300 MG: 300 CAPSULE ORAL at 21:12

## 2022-09-07 RX ADMIN — LEVALBUTEROL HYDROCHLORIDE 1.25 MG: 1.25 SOLUTION, CONCENTRATE RESPIRATORY (INHALATION) at 12:58

## 2022-09-07 RX ADMIN — METHYLPREDNISOLONE SODIUM SUCCINATE 40 MG: 40 INJECTION, POWDER, FOR SOLUTION INTRAMUSCULAR; INTRAVENOUS at 08:12

## 2022-09-07 RX ADMIN — BUDESONIDE 0.5 MG: 0.5 INHALANT ORAL at 19:57

## 2022-09-07 RX ADMIN — BUSPIRONE HYDROCHLORIDE 10 MG: 5 TABLET ORAL at 21:12

## 2022-09-07 RX ADMIN — IPRATROPIUM BROMIDE 0.5 MG: 0.5 SOLUTION RESPIRATORY (INHALATION) at 07:05

## 2022-09-07 RX ADMIN — IOHEXOL 70 ML: 350 INJECTION, SOLUTION INTRAVENOUS at 14:41

## 2022-09-07 RX ADMIN — CHLORTHALIDONE 25 MG: 25 TABLET ORAL at 08:16

## 2022-09-07 RX ADMIN — LEVALBUTEROL HYDROCHLORIDE 1.25 MG: 1.25 SOLUTION, CONCENTRATE RESPIRATORY (INHALATION) at 07:05

## 2022-09-07 RX ADMIN — ENOXAPARIN SODIUM 40 MG: 40 INJECTION SUBCUTANEOUS at 08:14

## 2022-09-07 RX ADMIN — BUDESONIDE 0.5 MG: 0.5 INHALANT ORAL at 07:05

## 2022-09-07 RX ADMIN — FORMOTEROL FUMARATE DIHYDRATE 20 MCG: 20 SOLUTION RESPIRATORY (INHALATION) at 07:23

## 2022-09-07 RX ADMIN — Medication 3 MG: at 21:12

## 2022-09-07 RX ADMIN — FINASTERIDE 5 MG: 5 TABLET, FILM COATED ORAL at 08:16

## 2022-09-07 RX ADMIN — IPRATROPIUM BROMIDE 0.5 MG: 0.5 SOLUTION RESPIRATORY (INHALATION) at 12:58

## 2022-09-07 RX ADMIN — BUSPIRONE HYDROCHLORIDE 10 MG: 5 TABLET ORAL at 15:49

## 2022-09-07 RX ADMIN — LEVALBUTEROL HYDROCHLORIDE 1.25 MG: 1.25 SOLUTION, CONCENTRATE RESPIRATORY (INHALATION) at 19:57

## 2022-09-07 NOTE — PLAN OF CARE
Problem: OCCUPATIONAL THERAPY ADULT  Goal: Performs self-care activities at highest level of function for planned discharge setting  See evaluation for individualized goals  Description:   Outcome: Progressing  Note: Limitation: Decreased ADL status, Decreased Safe judgement during ADL, Decreased cognition, Decreased endurance, Decreased self-care trans, Decreased high-level ADLs  Prognosis: Good  Assessment: Pt is a 65 y o  male seen for OT evaluation at [facility], admitted 9/3/2022 w/ COPD with acute exacerbation ([facility])  OT completed extensive review of pt's medical and social history  Comorbidities affecting pt's functional performance at time of assessment include: HTN, chronic PE, chronic respiratory failure, ambulatory dysfunction, depression, Parkinson disease, [place], emphysema, cognitive impairment, anxiety, neuropathy  Personal factors affecting pt at time of IE include:steps to enter environment, limited home support, difficulty performing ADLS, difficulty performing IADLS , limited insight into deficits, decreased initiation and engagement  and health management   Prior to admission, pt was living in [address], alone, and was independent with ADL, assisted with IADL, using RW  Upon evaluation, pt presents to OT below baseline due to the following performance deficits: weakness, decreased strength, decreased balance and decreased tolerance  Pt to benefit from continued skilled OT tx while in the hospital to address deficits as defined above and maximize level of functional independence w ADL's and functional mobility  Occupational Performance areas to address include: eating, grooming, bathing/shower, toilet hygiene, dressing, functional mobility and functional transfers, bed mobility  The patient's raw score on the AM-PAC Daily Activity inpatient short form is 16, standardized score is 35 96, less than 39 4   Patients at this level are likely to benefit from DC to post-acute rehabilitation services  Based on findings, pt is of high complexity, due to medical comorbidities  At this time, OT recommendations at time of discharge are short term rehab       OT Discharge Recommendation: Post acute rehabilitation services

## 2022-09-07 NOTE — OCCUPATIONAL THERAPY NOTE
Occupational Therapy Evaluation      Madai Travis    9/7/2022    Principal Problem:    COPD with acute exacerbation ([facility] [place] [facility] )  Active Problems:    Alpha-1-antitrypsin deficiency ([facility] )    Essential hypertension    Chronic pulmonary embolism (HCC)    Acute on chronic respiratory failure (HCC)    Ambulatory dysfunction    Depression, recurrent (HCC)    Parkinson disease ([facility] )    SIRS (systemic inflammatory response syndrome) ([facility])    Lactic acidosis      Past Medical History:   Diagnosis Date    Anesthesia complication     Difficult to wake up    Arthritis     BPH (benign prostatic hyperplasia)     urinary frequency    Cancer (HCC)     basal cell neck, face    CKD (chronic kidney disease) stage 2, GFR 60-89 ml/min 07/04/2022    COPD (chronic obstructive pulmonary disease) ([facility])     COPD exacerbation ([facility] ) 12/29/2019    Coronary artery disease     Elevated PSA 10/25/2018    Full dentures     Hiatal hernia     History of methicillin resistant staphylococcus aureus (MRSA)     10/11/2018 MRSA (nares) positive    Hypertension     Irritable bowel syndrome     Kidney stone     at least 7 episodes    Liver disease     Alpha 1- enzyme deficiency - diagnosed 2002  has been on weekly replacement therapy since then    Parkinson disease [facility])     Pulmonary emphysema ([facility] )     1/25/15  FEV1 - 2 45 liters or 59% of predicted    RSV infection 12/2017    SIRS (systemic inflammatory response syndrome) ([facility] [place] [facility] ) 8/14/2022    Wears glasses     for driving only       Past Surgical History:   Procedure Laterality Date    BACK SURGERY  2008    discectomy    CARDIAC CATHETERIZATION N/A 5/3/2022    Procedure: Cardiac catheterization both left and right heart catheterization with vaso dilatory trial;  Surgeon: Tod Hanson MD;  Location: [place] [address] CATH LAB; Service: Cardiology    CARDIAC CATHETERIZATION Left 5/3/2022    Procedure: Cardiac Left Heart Cath;  Surgeon: Tod Hanson MD;  Location: [place] [address] CATH LAB;   Service: Cardiology    CARDIAC CATHETERIZATION Left 5/3/2022    Procedure: Cardiac Left Ventriculogram;  Surgeon: Isaac Gil MD;  Location: [place] [address] CATH LAB; Service: Cardiology    COLONOSCOPY      COLONOSCOPY N/A 3/10/2017    Procedure: Elfredia Reus;  Surgeon: Yakelin Matthew MD;  Location: [facility] GI LAB; Service:     CYSTOSCOPY      removal of kidney stones    ESOPHAGOGASTRODUODENOSCOPY N/A 3/10/2017    Procedure: ESOPHAGOGASTRODUODENOSCOPY (EGD); Surgeon: Yakelin Matthew MD;  Location: [facility] GI LAB; Service:     LITHOTRIPSY      ME ESOPHAGOGASTRODUODENOSCOPY TRANSORAL DIAGNOSTIC N/A 11/20/2018    Procedure: ESOPHAGOGASTRODUODENOSCOPY (EGD); Surgeon: Yakelin Matthew MD;  Location: [facility] GI LAB; Service: Gastroenterology    TONSILLECTOMY      VEIN LIGATION AND STRIPPING Bilateral     1980's 09/07/22 1338   OT Last Visit   OT Visit Date 09/07/22   Note Type   Note type Evaluation   Restrictions/Precautions   Weight Bearing Precautions Per Order No   Other Precautions Chair Alarm; Bed Alarm;O2;Multiple lines; Fall Risk  (SPO2 remained 93-96% on supplemental O2 during session)   Pain Assessment   Pain Assessment Tool 0-10   Pain Score No Pain   Home Living   Type of [address] One level  (2STE)   Bathroom Shower/Tub Walk-in shower  (has been sponge bathing recently)   [address] bars in [facility] Walker;Grab bars  (rollator; primarily using RW)   Prior Function   Level of [place] Independent with ADLs and functional mobility   Lives With (S)  Alone   Receives Help From Family   ADL Assistance Independent   IADLs Needs assistance  (hasn't driven in approx 4 months, daughter completes grocery shopping 1x/wk, pt has cleaning service)   Falls in the last 6 months 1 to 4   Psychosocial   Psychosocial (WDL) WDL   ADL   Eating Assistance 7  Independent   Grooming Assistance 5  Supervision/Setup   [address]  Maximal Assistance   UB Dressing Assistance 4  Minimal Assistance   LB Dressing Assistance 2  Maximal Assistance   Toileting Assistance  2  Maximal Assistance   Additional Comments Pt able to perform to standing trials, approx 15-30 sec each, to change brief/pad  Bed Mobility   Additional Comments Pt OOB in recliner upon arrival   Transfers   Sit to Stand 3  Moderate assistance   Additional items Assist x 1   Stand to Sit 4  Minimal assistance   Additional items Assist x 1   Stand pivot Unable to assess   Additional Comments Pt with audible shortness of breath, increased with activity, despite stable SpO2  States his breathing feels worse today, even though he had a breathing treatment, therefore session not progressed further at this time  Activity Tolerance   Activity Tolerance Patient limited by fatigue  (shortness of breath)   Nurse Made Aware RN Edite   RUE Assessment   RUE Assessment WFL   LUE Assessment   LUE Assessment WFL   Cognition   Overall Cognitive Status Impaired   Arousal/Participation Alert; Cooperative   Attention Attends with cues to redirect   Orientation Level Oriented to person;Oriented to place   Memory Decreased recall of precautions   Following Commands Follows one step commands with increased time or repetition   Assessment   Limitation Decreased ADL status; Decreased Safe judgement during ADL;Decreased cognition;Decreased endurance;Decreased self-care trans;Decreased high-level ADLs   Prognosis Good   Assessment Pt is a 65 y o  male seen for OT evaluation at [facility], admitted 9/3/2022 w/ COPD with acute exacerbation ([address] )  OT completed extensive review of pt's medical and social history  Comorbidities affecting pt's functional performance at time of assessment include: HTN, chronic PE, chronic respiratory failure, ambulatory dysfunction, depression, Parkinson disease, [place], emphysema, cognitive impairment, anxiety, neuropathy   Personal factors affecting pt at time of IE include:steps to enter environment, limited home support, difficulty performing ADLS, difficulty performing IADLS , limited insight into deficits, decreased initiation and engagement  and health management   Prior to admission, pt was living in [facility], alone, and was independent with ADL, assisted with IADL, using RW  Upon evaluation, pt presents to OT below baseline due to the following performance deficits: weakness, decreased strength, decreased balance and decreased tolerance  Pt to benefit from continued skilled OT tx while in the hospital to address deficits as defined above and maximize level of functional independence w ADL's and functional mobility  Occupational Performance areas to address include: eating, grooming, bathing/shower, toilet hygiene, dressing, functional mobility and functional transfers, bed mobility  The patient's raw score on the AM-PAC Daily Activity inpatient short form is 16, standardized score is 35 96, less than 39 4  Patients at this level are likely to benefit from DC to post-acute rehabilitation services  Based on findings, pt is of high complexity, due to medical comorbidities  At this time, OT recommendations at time of discharge are short term rehab  Goals   Patient Goals new brief   Plan   Treatment Interventions ADL retraining;UE strengthening/ROM; Functional transfer training; Endurance training;Cognitive reorientation;Equipment evaluation/education;Patient/family training; Compensatory technique education;Continued evaluation; Energy conservation   Goal Expiration Date 09/17/22   OT Frequency 3-5x/wk   Recommendation   OT Discharge Recommendation Post acute rehabilitation services   AM-[facility] Daily Activity Inpatient   Lower Body Dressing 2   Bathing 2   Toileting 2   Upper Body Dressing 3   Grooming 3   Eating 4   Daily Activity Raw Score 16   Daily Activity Standardized Score (Calc for Raw Score >=11) 35 96   AM-[facility] Applied Cognition Inpatient   Following a Speech/Presentation 3   Understanding Ordinary Conversation 3   Taking Medications 3   Remembering Where Things Are Placed or Put Away 3   Remembering List of 4-5 Errands 3   Taking Care of Complicated Tasks 3   Applied Cognition Raw Score 18   Applied Cognition Standardized Score 38 07     Pt will achieve the following goals within 10 days  *Pt will complete grooming with independence  *Pt will complete UB bathing and dressing with independence  *Pt will complete LB bathing and dressing with modified independence   *Pt will complete toileting (hygiene and clothing management) with independence    *Pt will complete bed mobility with independence, with bed flat and no side rail to prep for purposeful tasks    *Pt will perform functional transfers with modified independence in order to complete ADL routine  *Pt will increase standing tolerance to 3+ minutes in order to complete ADL routine  *Pt will complete item retrieval and light home management with supervision while demonstrating good safety  *Pt will demonstrate increased activity tolerance in order to complete ADL routine  *Pt will participate in cognitive assessment to determine level of safety for returning home    *Pt will participate in UE therapeutic exercise in order to maximize strength for ADL transfers  *Pt will sit on EOB for 10+ minutes for increased safety with seated activity tolerance during ADL tasks  *Pt will identify 3-5 fall risks to ensure safety upon discharge      [place], MS, OTR/L

## 2022-09-07 NOTE — PLAN OF CARE
Problem: MOBILITY - ADULT  Goal: Maintain or return to baseline ADL function  Description: INTERVENTIONS:  -  Assess patient's ability to carry out ADLs; assess patient's baseline for ADL function and identify physical deficits which impact ability to perform ADLs (bathing, care of mouth/teeth, toileting, grooming, dressing, etc )  - Assess/evaluate cause of self-care deficits   - Assess range of motion  - Assess patient's mobility; develop plan if impaired  - Assess patient's need for assistive devices and provide as appropriate  - Encourage maximum independence but intervene and supervise when necessary  - Involve family in performance of ADLs  - Assess for home care needs following discharge   - Consider OT consult to assist with ADL evaluation and planning for discharge  - Provide patient education as appropriate  Outcome: Progressing  Goal: Maintains/Returns to pre admission functional level  Description: INTERVENTIONS:  - Perform BMAT or MOVE assessment daily    - Set and communicate daily mobility goal to care team and patient/family/caregiver  - Collaborate with rehabilitation services on mobility goals if consulted  - Perform Range of Motion  times a day  - Reposition patient every  hours    - Dangle patient  times a day  - Stand patient  times a day  - Ambulate patient  times a day  - Out of bed to chair  times a day   - Out of bed for meals  times a day  - Out of bed for toileting  - Record patient progress and toleration of activity level   Outcome: Progressing     Problem: Potential for Falls  Goal: Patient will remain free of falls  Description: INTERVENTIONS:  - Educate patient/family on patient safety including physical limitations  - Instruct patient to call for assistance with activity   - Consult OT/PT to assist with strengthening/mobility   - Keep Call bell within reach  - Keep bed low and locked with side rails adjusted as appropriate  - Keep care items and personal belongings within reach  - Initiate and maintain comfort rounds  - Make Fall Risk Sign visible to staff  - Offer Toileting every  Hours, in advance of need  - Initiate/Maintain alarm  - Obtain necessary fall risk management equipment:  - Apply yellow socks and bracelet for high fall risk patients  - Consider moving patient to room near nurses station  Outcome: Progressing     Problem: Prexisting or High Potential for Compromised Skin Integrity  Goal: Skin integrity is maintained or improved  Description: INTERVENTIONS:  - Identify patients at risk for skin breakdown  - Assess and monitor skin integrity  - Assess and monitor nutrition and hydration status  - Monitor labs   - Assess for incontinence   - Turn and reposition patient  - Assist with mobility/ambulation  - Relieve pressure over bony prominences  - Avoid friction and shearing  - Provide appropriate hygiene as needed including keeping skin clean and dry  - Evaluate need for skin moisturizer/barrier cream  - Collaborate with interdisciplinary team   - Patient/family teaching  - Consider wound care consult   Outcome: Progressing     Problem: RESPIRATORY - ADULT  Goal: Achieves optimal ventilation and oxygenation  Description: INTERVENTIONS:  - Assess for changes in respiratory status  - Assess for changes in mentation and behavior  - Position to facilitate oxygenation and minimize respiratory effort  - Oxygen administered by appropriate delivery if ordered  - Initiate smoking cessation education as indicated  - Encourage broncho-pulmonary hygiene including cough, deep breathe, Incentive Spirometry  - Assess the need for suctioning and aspirate as needed  - Assess and instruct to report SOB or any respiratory difficulty  - Respiratory Therapy support as indicated  Outcome: Progressing     Problem: DISCHARGE PLANNING  Goal: Discharge to home or other facility with appropriate resources  Description: INTERVENTIONS:  - Identify barriers to discharge w/patient and caregiver  - Arrange for needed discharge resources and transportation as appropriate  - Identify discharge learning needs (meds, wound care, etc )  - Arrange for interpretive services to assist at discharge as needed  - Refer to Case Management Department for coordinating discharge planning if the patient needs post-hospital services based on physician/advanced practitioner order or complex needs related to functional status, cognitive ability, or social support system  Outcome: Progressing     Problem: Knowledge Deficit  Goal: Patient/family/caregiver demonstrates understanding of disease process, treatment plan, medications, and discharge instructions  Description: Complete learning assessment and assess knowledge base    Interventions:  - Provide teaching at level of understanding  - Provide teaching via preferred learning methods  Outcome: Progressing

## 2022-09-07 NOTE — PROGRESS NOTES
Progress Note - Pulmonary   Jonathan Lie 65 y o  male MRN: [id number]  Unit/Bed#: S -01 Encounter: [id number]    Assessment/Plan:    1  Acute pulmonary insufficiency on chronic hypoxic respiratory failure      -  currently on home O2 requirement of 3 L-96%      -  maintain saturations greater than 88%      -  pulmonary toileting:  Deep breathing cough, OOB , IS Q 1 hr    2  Severe COPD with acute exacerbation       -  Inpatient:  Continue IV Solu-Medrol 40 mg b i d , Perforomist b i d , Xopenex/Atrovent t i d         -  home regimen:  Budesonide/Perforomist b i d , Ventolin 2 puffs Q 4 p r n , Xopenex/Atrovent t i d , Daliresp 500 mg daily       -  palliative following    3  Alpha-1 antitrypsin deficiency       -  unfortunately has missed weekly replacement therapy with alpha-1 proteinase inhibitor x 1 month       - recommend restarting immediately upon discharge       -  administered per visiting nurse    4  H/O PE       -  2018- chronic PE extending left main into left lower interlobular PA       -  completed 6 months of anticoagulation       -  given severity of shortness of breath and history of PE will repeat CTA today       -  if PE is present will need lifelong anticoagulation            Chief Complaint:    "I am still s short of breath"    Subjective:    Abelardo was comfortably sitting in his bed  He reports he is still feeling very short of breath  No significant overnight events reported  Patient currently denying any fevers, chills hemoptysis, headaches, night sweats, pleuritic chest pain, or palpitations  Objective:    Vitals: Blood pressure 120/78, pulse 81, temperature 97 7 °F (36 5 °C), resp  rate 18, SpO2 98 %  RA,There is no height or weight on file to calculate BMI        Intake/Output Summary (Last 24 hours) at 9/7/2022 1218  Last data filed at 9/6/2022 2348  Gross per 24 hour   Intake --   Output 650 ml   Net -650 ml       Invasive Devices  Report    Peripheral Intravenous Line  Duration Peripheral IV 09/05/22 Distal;Dorsal (posterior); Left Forearm 1 day                Physical Exam:   Physical Exam  Constitutional:       General: He is not in acute distress  Appearance: Normal appearance  He is normal weight  He is not ill-appearing  HENT:      Head: Normocephalic and atraumatic  Nose: Nose normal  No congestion or rhinorrhea  Mouth/Throat:      Mouth: Mucous membranes are dry  Pharynx: No oropharyngeal exudate or posterior oropharyngeal erythema  Cardiovascular:      Rate and Rhythm: Normal rate and regular rhythm  Pulses: Normal pulses  Heart sounds: Normal heart sounds  No murmur heard  No friction rub  No gallop  Pulmonary:      Effort: Pulmonary effort is normal  No tachypnea, bradypnea, accessory muscle usage or respiratory distress  Breath sounds: Decreased air movement present  No stridor  Decreased breath sounds present  No wheezing, rhonchi or rales  Comments: Significantly diminished aeration  Chest:      Chest wall: No tenderness  Abdominal:      General: Abdomen is flat  Bowel sounds are normal  There is no distension  Palpations: Abdomen is soft  There is no mass  Musculoskeletal:         General: No swelling or tenderness  Normal range of motion  Cervical back: Normal range of motion  No rigidity or tenderness  Skin:     General: Skin is warm and dry  Coloration: Skin is not jaundiced or pale  Neurological:      General: No focal deficit present  Mental Status: He is alert and oriented to person, place, and time  Mental status is at baseline  Psychiatric:         Mood and Affect: Mood normal          Behavior: Behavior normal          Labs: I have personally reviewed pertinent lab results  , CBC:   Lab Results   Component Value Date    WBC 10 12 09/07/2022    HGB 13 2 09/07/2022    HCT 37 7 09/07/2022    MCV 97 09/07/2022     09/07/2022    MCH 33 8 09/07/2022    MCHC 35 0 09/07/2022    RDW 15 1 09/07/2022    MPV 10 2 09/07/2022   , CMP:   Lab Results   Component Value Date    SODIUM 139 09/07/2022    K 4 3 09/07/2022     09/07/2022    CO2 28 09/07/2022    BUN 29 (H) 09/07/2022    CREATININE 0 90 09/07/2022    CALCIUM 9 3 09/07/2022    EGFR 81 09/07/2022       Imaging and other studies: I have personally reviewed pertinent films in PACS     Chest x-ray- 9/3/2022- emphysematous changes

## 2022-09-07 NOTE — CASE MANAGEMENT
Case Management Progress Note    Patient name Enio Richards  Location S /S -01 MRN [id number]  : 1945 Date 2022       LOS (days): 4  Geometric Mean LOS (GMLOS) (days): 3 60  Days to GMLOS:-0 1        OBJECTIVE:        Current admission status: Inpatient  Preferred Pharmacy:   [id number] - [place], [address]  [address] [place] [address]  Phone: [phone] Fax: [phone]    [address] [facility] [address]  [address] [facility] [address]  Phone: [phone] Fax: [phone]    [address], [address]  [address] [facility] [address]  Phone: [phone] Fax: [phone]    Primary Care Provider: Farrah Jones MD    Primary Insurance: MEDICARE  Secondary Insurance: [facility]    PROGRESS NOTE:    Weekly Care Management Length of Stay Review     Current LOS: 4 Days    Most Recent Labs:     Lab Results   Component Value Date/Time    WBC 10 12 2022 06:10 AM    HGB 13 2 2022 06:10 AM    HCT 37 7 2022 06:10 AM     2022 06:10 AM    BANDSPCT 1 2022 06:10 AM    SODIUM 139 2022 06:10 AM    K 4 3 2022 06:10 AM     2022 06:10 AM    CO2 28 2022 06:10 AM    BUN 29 (H) 2022 06:10 AM    CREATININE 0 90 2022 06:10 AM    GLUC 140 2022 06:10 AM       Most Recent Vitals:   Vitals:    22 0707   BP:    Pulse:    Resp:    Temp:    SpO2: 98%        Identified Barriers to Discharge/Discharge Goals/Care Management Interventions: COPD exac, IV steroids, GOC convo - palliative consult    Intended Discharge Disposition: Daron Bhat     Expected Discharge Date:

## 2022-09-07 NOTE — DISCHARGE SUMMARY
[facility]  Discharge- Marshell Dakin 1945, 65 y o  male MRN: [id number]  Unit/Bed#: S -01 Encounter: [id number]  Primary Care Provider: Neil Phillips MD   Date and time admitted to hospital: 9/3/2022  1:55 PM    Lactic acidosis  Assessment & Plan  · Low suspicion for sepsis  · 2/2 increased respiratory muscle use, deconditioning  steroid dose decreased,  which might have an accounting for the increase in lactic acid  · Received fluids overnight   · Lactic acid trending down, lactate the morning of September 10, 2020 now 2 4, down from 3 2  SIRS (systemic inflammatory response syndrome) (HCC)  Assessment & Plan  · Patient presented to the ED with tachycardia and tachypnea  · No fever, no evidence of infection at this time  · White blood cell normal, procalcitonin normal, blood cultures - no growth after 5 days    Parkinson disease ([facility] [address] )  Assessment & Plan  Continue sinemet  Depression, recurrent (HCC)  Assessment & Plan  Continue Buspar 10 mg tid and mirtazapine 30 mg    Syncope  Assessment & Plan  - Patient had a fall yesterday, he describes getting up from the chair with assistance from the nose and trying to get to the bedroom  He reported period of transient blood count found himself on the floor and hit his back   orthostatic blood pressures were negative for orthostatic hypotension   most likely vasovagal syncope  - Echo results, as noted above    Plan   - fall precautions      Ambulatory dysfunction  Assessment & Plan  · Multifactorial due to age, Parkinson's disease, peripheral neuropathy, chronic medical conditions   · Recently discharged from rehab, lives at home by himself, uses a walker  · Fall precautions  · PT/OT  · patient had a fall 09/10 while trying to go to the bathroom and in the presence of a nurse    He reports transient parent of blackout after which he felt the ground hitting his back to the floor, no head injury  · x-ray of the hip done yesterday was unremarkable  ·  patient endorsed low back pain, now only tender to palpation    Acute on chronic respiratory failure (HCC)  Assessment & Plan    · Chronically on 3 L oxygen secondary to centrilobular emphysema and alpha 1 antitrypsin deficiency  · Pulmonology on board, appreciate recommendations  · Chest x-ray with no evidence of cardiopulmonary disease , CT chest severe emphysema, chronic PE changes  · Monitor oxygen requirements and keep SpO2 over 88%  · This morning 09/12 was on 3 L, O2 sat 95%  · Continue incentive spirometry  · Treatment for COPD exacerbation as above  · Sniff test negative for diaphragmatic paralysis  · YOBANY negative for PFO, more suggestive of pulmonary arteriovenous malformation  · Plan to discharge today to short-term rehabilitation as available      Chronic pulmonary embolism [facility])  Assessment & Plan  · Per chart review, patient completed 6-month course of Eliquis per oncology in 2018  If patient develops another acute PE then will need lifelong AC  · No need for systemic anticoagulation at this time  · No new finding of acute PE with current SOB  · YOBANY 09/12/22:  Negative for pain foramen ovale, more suggestive of pulmonary arteriovenous malformations  EF 70%  Essential hypertension  Assessment & Plan  Continue home medications  BP stable    Alpha-1-antitrypsin deficiency (HCC)  Assessment & Plan  · On home Zemaira infusion per pulmonology  · Unable to get his infusions for the past 1 month because he was hospitalized and then had rehab   · Pulmonology consulted, patient should continue injections upon discharge       * COPD with acute exacerbation [facility])  Assessment & Plan  Patient returning to the hospital for another severe exacerbation of his COPD    Follows with [facility]  Last admission 8/18 for exacerbation of COPD and was on tapered dose of prednisone and required azithromycin  Several readmissions in the past 6 months for COPD exacerbation  Workup for this admission shows no evidence of infection  WBC normal, chest x-ray no evidence of cardiopulmonary disease, COVID/flu/RSV negative  YOBANY (echo) bubble study: EF 70%  No patent foramen ovale detected, more suggestive of pulmonary arteriovenous malformation  Plan; pulmonology recomendations appreciated  · Discontinued Solu-Medrol and transition to prednisone taper at 40 mg daily, reduce dose by 10 mg every 3 days  Patient currently on day 2/3 of 40 mg PO  · Atrovent/Xopenex t i d    · Budesonide/Perforomist b i d   · Daliresp 500 mg daily   · Continue performist and Pulmicort  · Respiratory protocol, airway clearance, antitussives and incentive spirometry    · Procal negative x2, No antibiotic requirements at this time  · Pulmonary rehab  · Palliative care consulted; [address] discussion given severe COPD with recurrent admissions- conversations on going, continue to follow  · Plan to discharge today to short-term rehabilitation as available      Medical Problems             Resolved Problems  Date Reviewed: 9/10/2022   None               Discharging Resident: Anisha Rose DO  Discharging Attending: Kimberly Ludwig MD  PCP: Mayco Garvin MD  Admission Date:   Admission Orders (From admission, onward)     Ordered        09/03/22 1832  Inpatient Admission  Once            09/03/22 1734  Place in Observation  Once                      Discharge Date: 09/12/22    Consultations During Hospital Stay:  · Palliative care  · Pulmonology  · OT/PT    Procedures Performed:   · None    Significant Findings / Test Results:   · EKG: SR, nonspecific ST abnormality, prolonged QT   QT/QTc = 416/480 ms  · CXR:  Emphysematous changes noted; no focal consolidation, pleural effusion or pneumothorax  · CTA Chest:  No acute pulmonary embolus, severe emphysema, redemonstration of chronic clot on the left  · Fluoroscopic sniff test:  No evidence of diaphragmatic paralysis  · XR hip/pelvis:  No acute fracture or dislocation, right hip arthritis and evidence of avascular necrosis unchanged from prior study  · YOBANY (echo) bubble study: Left ventricular cavity size and wall thickness normal, EF 70%  Systolic function is hyperdynamic, diastolic function mildly abnormal consistent with grade 1 relaxation  Right ventricular cavity size is mildly dilated, systolic function normal   No patent foramen ovale detected at rest or with the cough/Valsalva, more suggestive of pulmonary arteriovenous malformation  Pulmonary artery systolic pressure is mildly increased  Incidental Findings:   · None     Test Results Pending at Discharge (will require follow up): · None     Outpatient Tests Requested:  · None    Complications:  None    Reason for Admission:  COPD exacerbation, ambulatory dysfunction    Hospital Course:   Marely Schmidt is a 65 y o  male  With past medical history of  severe COPD- FEV1 of 40-45%, alpha-1 antitrypsin deficiency on zemaira, ( however has not received this for almost 3 months ) , chronic respiratory failure on oxygen requirement of around 3 L, Parkinson's disease  Patient with frequent admissions for COPD exacerbation  And had just been recently discharged from a rehab facility  Patient lives at home with not much support, who originally presented to the hospital on 9/3/2022 due to  COPD exacerbation with ambulatory dysfunction  Because patient has been in and out hospital on rehab he missed several doses of his Zemaira  Patient was brought to hospital by the EMS as patient fell at home and was not able to get back on his feet in addition to have been hypoxic  Patient reports compliance with nebulizers and medications, he denied fever, productive cough, sore throat chest pain, headache, abdominal pain, nausea vomiting  On admission, patient was tachypneic, tachycardic requiring  4 L of oxygen for saturation above 95%    Blood work showed elevated lactic acid,  Similar to other hospital presentations,  CT scan showed no evidence of new pulmonary embolism,   Severe emphysema, venous blood gas mild respiratory alkalosis  Patient was started on IV steroids, LAMA, LABA,  Respiratory protocol, with low recurrence,  Pulmonology consulted  Hospital stay was marked by worsening of the dyspnea at rest, with minimal activity  Patient also had a fall on the inpatient unit while being assisted to the bathroom  Per chart he became dizzy and fell over onto his right side without loss of consciousness or head trauma  Patient did endorse lower lumbar back pain  X-ray was ordered, no fracture or dislocation appreciated, findings noted above  Patient required 5 L via nasal cannula at one point of his stay, however on discharge day patient was back to baseline oxygen requirements, 3 L nasal cannula with O2 saturation 95% and noted overall improvement in respiratory function  Fluoroscopic sniff test negative for diaphragmatic paralysis, echo bubble study negative for patent foramen ovale and more suggestive of a pulmonary arteriovenous malformations and as noted above  Last documented Lactic acid level 2 4 (09/10/22) and has been trending down, likely steroid induced lactic acidosis  Patient is being discharged 09/12 for STR at [facility] and is currently on a prednisone taper  Patient should continue with Zemaira infusions as indicated upon discharge  Please see above list of diagnoses and related plan for additional information  Condition at Discharge: stable    Discharge Day Visit / Exam:   Subjective:  Patient seen and evaluated at bedside, in no acute distress  No acute events overnight  Patient reports his breathing is "not too bad" this morning as he had just received a nebulizer treatment  He continues to endorse a decreased appetite and some nausea contributing to this the past couple of days as well as weakness and feeling tired    He also states he is feeling bloated and constipated this morning despite a bowel movement earlier today, per nurse  He also says he is experiencing a mild cough with some sputum production "for a change" today, but overall feels his breathing is improved today  Patient is back on to baseline oxygen requirement, 3 L nasal cannula  He also does state he got a little lightheaded this morning when he got out of bed but has resolved now  With regards to his lower back pain, he reports it only hurts on touch but is fine at rest  Patient denies fevers, chills, dizziness, chest pain, palpitations, shortness of breath, vomiting, diarrhea  Vitals: Blood Pressure: 121/74 (09/12/22 0905)  Pulse: 104 (09/12/22 0905)  Temperature: 97 7 °F (36 5 °C) (09/12/22 0746)  Temp Source: Oral (09/12/22 0746)  Respirations: 16 (09/12/22 0746)  Height: 6' 5" (195 6 cm) (09/12/22 0905)  Weight - Scale: 86 2 kg (190 lb) (09/12/22 0905)  SpO2: 96 % (09/12/22 1406)  Exam:   Physical Exam  Constitutional:       General: He is not in acute distress  Appearance: He is ill-appearing  He is not diaphoretic  HENT:      Head: Normocephalic and atraumatic  Eyes:      Conjunctiva/sclera: Conjunctivae normal    Cardiovascular:      Rate and Rhythm: Normal rate and regular rhythm  Heart sounds: No murmur heard  Pulmonary:      Effort: Pulmonary effort is normal       Breath sounds: Normal breath sounds  No wheezing or rales  Abdominal:      General: Bowel sounds are normal  There is distension  Tenderness: There is no abdominal tenderness  Musculoskeletal:      Lumbar back: Tenderness: left paraspinal tenderness from fall  Right lower leg: No edema  Left lower leg: No edema  Skin:     General: Skin is warm  Coloration: Skin is not jaundiced  Findings: No bruising or lesion  Neurological:      General: No focal deficit present  Mental Status: He is alert     Psychiatric:         Mood and Affect: Mood normal          Behavior: Behavior normal          Discussion with Family: Updated contact person (daughter) via phone  Discharge instructions/Information to patient and family:   See after visit summary for information provided to patient and family  Provisions for Follow-Up Care:  See after visit summary for information related to follow-up care and any pertinent home health orders  Disposition:   Acute Rehab at [address] [place] [address] Readmission: no    Discharge Medications:  See after visit summary for reconciled discharge medications provided to patient and/or family        **Please Note: This note may have been constructed using a voice recognition system**

## 2022-09-07 NOTE — ASSESSMENT & PLAN NOTE
· Low suspicion for sepsis  · 2/2 increased respiratory muscle use, deconditioning  · Received fluids overnight  · Trend levels until normal  · Lactic acid trending down

## 2022-09-07 NOTE — PROGRESS NOTES
Progress note - Palliative and Supportive Care   Eric Frank 65 y o  male [id number]    Patient Active Problem List   Diagnosis    Alpha-1-antitrypsin deficiency ([facility] )    Gastroesophageal reflux disease    Essential hypertension    BPH (benign prostatic hyperplasia)    Liver disease    Former smoker    Chronic pulmonary embolism (HCC)    Acute on chronic respiratory failure ([facility] [address] )    CLAYTON (acute kidney injury) ([facility] )    Centrilobular emphysema (HCC)    Chronic venous insufficiency    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 (HCC)    Insomnia    Ambulatory dysfunction    Allergic rhinitis    Benign colon polyp    Cataracts, both eyes    Chronic diarrhea     Peripheral neuropathy    COPD with acute exacerbation (HCC)    Orthostatic hypotension with spine hypertension    Palliative care patient    Vitamin D deficiency disease    Avascular necrosis of hip, right (HCC)    Depression, recurrent ([place] [address] )    Anxiety    Parkinson disease ([place] [address] )    Cognitive impairment    Hypertensive urgency    Chest heaviness    H/O cardiac catheterization    Panlobular emphysema (HCC)    SIRS (systemic inflammatory response syndrome) (HCC)    Lactic acidosis     Active issues specifically addressed today include:   Very severe COPD  Parkinson's disease  Ambulatory dysfunction  Mild cognitive impairment  Depression due to medical illness  Goals of care  Palliative care patient    Plan:  1  Symptom management -      -melatonin HS, please avoid interruptions overnight              -Patient at risk for delirium given age and co-morbidities, recommend global delirium precautions as follows:  Establishment of day/night cycle via lights during the day and blinds open   Please limit interruptions at night as medically appropriate  Patient should be out of bed during the day as tolerated or medically indicated  Provide glasses/hearing aids as appropriate  Minimize deliriogenic meds as able   Provide reorientation including date on board and visible clock  Avoid restraints as able, frequent verbal reorientations or patient care sitter as appropriate  Consider use of melatonin qHS for circadian rhythm maintenance        -     2  Goals - Ongoing conversations, remains treatment focused   -    3  Psychosocial support- support provided   -    4  PSC follow-up- will continue to follow       Code Status: Full - Level 1   Decisional apparatus:  Patient is competent on my exam today  If competence is lost, patient's substitute decision maker would default to children by [place]  Advance Directive / Living Will / POLST:  No    Interval history:       Patient reports feeling "winded" after eating lunch  Awaiting breathing treatment  Is still considering advanced care planning  Agrees to ongoing follow up       MEDICATIONS / ALLERGIES:     all current active meds have been reviewed and current meds:   Current Facility-Administered Medications   Medication Dose Route Frequency    albuterol inhalation solution 2 5 mg  2 5 mg Nebulization Q4H PRN    amLODIPine (NORVASC) tablet 10 mg  10 mg Oral Daily    budesonide (PULMICORT) inhalation solution 0 5 mg  0 5 mg Nebulization Q12H    busPIRone (BUSPAR) tablet 10 mg  10 mg Oral TID    chlorthalidone tablet 25 mg  25 mg Oral Daily    enoxaparin (LOVENOX) subcutaneous injection 40 mg  40 mg Subcutaneous Daily    finasteride (PROSCAR) tablet 5 mg  5 mg Oral QAM    formoterol (PERFOROMIST) nebulizer solution 20 mcg  20 mcg Nebulization Q12H    gabapentin (NEURONTIN) capsule 300 mg  300 mg Oral TID    ipratropium (ATROVENT) 0 02 % inhalation solution 0 5 mg  0 5 mg Nebulization TID    levalbuterol (XOPENEX) inhalation solution 1 25 mg  1 25 mg Nebulization TID    melatonin tablet 3 mg  3 mg Oral HS    methylPREDNISolone sodium succinate (Solu-MEDROL) injection 40 mg  40 mg Intravenous Q12H [facility]    mirtazapine (REMERON) tablet 30 mg  30 mg Oral HS    roflumilast (DALIRESP) tablet 500 mcg  500 mcg Oral Daily    tamsulosin (FLOMAX) capsule 0 4 mg  0 4 mg Oral Daily       Allergies   Allergen Reactions    Chantix [Varenicline]      Caused prostate infection       OBJECTIVE:    Physical Exam  Physical Exam  Vitals and nursing note reviewed  Constitutional:       General: He is not in acute distress  Appearance: He is ill-appearing  HENT:      Head: Normocephalic and atraumatic  Right Ear: External ear normal       Left Ear: External ear normal       Nose: Nose normal    Eyes:      General: No scleral icterus  Right eye: No discharge  Left eye: No discharge  Cardiovascular:      Rate and Rhythm: Regular rhythm  Tachycardia present  Pulmonary:      Effort: Pulmonary effort is normal  No respiratory distress  Abdominal:      General: Bowel sounds are normal  There is no distension  Palpations: Abdomen is soft  Skin:     General: Skin is warm and dry  Coloration: Skin is pale  Neurological:      Mental Status: He is alert and oriented to person, place, and time  Comments: Resting tremor noted in both hands   Psychiatric:         Mood and Affect: Mood normal          Behavior: Behavior normal          Thought Content: Thought content normal          Judgment: Judgment normal            Lab Results:   I have personally reviewed pertinent labs  , CBC:   Lab Results   Component Value Date    WBC 10 12 09/07/2022    HGB 13 2 09/07/2022    HCT 37 7 09/07/2022    MCV 97 09/07/2022     09/07/2022    MCH 33 8 09/07/2022    MCHC 35 0 09/07/2022    RDW 15 1 09/07/2022    MPV 10 2 09/07/2022   , CMP:   Lab Results   Component Value Date    SODIUM 139 09/07/2022    K 4 3 09/07/2022     09/07/2022    CO2 28 09/07/2022    BUN 29 (H) 09/07/2022    CREATININE 0 90 09/07/2022    CALCIUM 9 3 09/07/2022    EGFR 81 09/07/2022   , PT/PTT:No results found for: PT, PTT  Imaging Studies: reviewed  EKG, Pathology, and Other Studies: reviewed    Counseling / Coordination of Care    Total floor / unit time spent today 15+ minutes  Greater than 50% of total time was spent with the patient and / or family counseling and / or coordination of care  A description of the counseling / coordination of care: chart review, medication review, goals of care, supportive listening  Portions of this document may have been created using dictation software and as such some "sound alike" terms may have been generated by the system  Do not hesitate to contact me with any questions or clarifications

## 2022-09-07 NOTE — CASE MANAGEMENT
Case Management Discharge Planning Note    Patient name Elvira Moeller  Location S /S -01 MRN [id number]  : 1945 Date 2022       Current Admission Date: 9/3/2022  Current Admission Diagnosis:COPD with acute exacerbation [facility])   Patient Active Problem List    Diagnosis Date Noted    SIRS (systemic inflammatory response syndrome) ([address] ) 2022    Lactic acidosis 2022    Panlobular emphysema ([address] )     H/O cardiac catheterization 2022    Chest heaviness 2022    Hypertensive urgency 2022    Cognitive impairment 2021    Parkinson disease ([address] ) 2021    Anxiety 2021    Vitamin D deficiency disease 2021    Avascular necrosis of hip, right ([address] ) 2021    Depression, recurrent ([address] ) 2021    Palliative care patient 11/10/2020    Orthostatic hypotension with spine hypertension 2020    COPD with acute exacerbation ([address] ) 2019    Ambulatory dysfunction 2019    Insomnia 2019    Chronic venous insufficiency 2019    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 ([address] ) 2019    Centrilobular emphysema [facility] ) 2018    CLAYTON (acute kidney injury) ([address] ) 2018    Acute on chronic respiratory failure ([address] ) 2018    Chronic pulmonary embolism ([facility] [facility] ) 2018    Former smoker 2018    Liver disease     Alpha-1-antitrypsin deficiency ([address] ) 2017    Gastroesophageal reflux disease 2017    Essential hypertension 2017    BPH (benign prostatic hyperplasia) 2017    Peripheral neuropathy 2017    Allergic rhinitis 2016    Cataracts, both eyes 2015    Chronic diarrhea  2014    Benign colon polyp 2014      LOS (days): 4  Geometric Mean LOS (GMLOS) (days): 3 60  Days to GMLOS:-0 1     OBJECTIVE:  Risk of Unplanned Readmission Score: 33 85         Current admission status: Inpatient   Preferred Pharmacy:   [id number] - [address]  [address]  [facility]  Phone: [phone] Fax: [phone]    [address] [address]  Phone: [phone] Fax: [phone]    [address], [address] [facility] [address] [facility]  [address] [facility]  [address] [facility] [address] [facility]  Phone: [phone] Fax: [phone]    Primary Care Provider: Jennifer Paniagua MD    Primary Insurance: MEDICARE  Secondary Insurance: [place] [employer] [place]    DISCHARGE DETAILS:    Discharge planning discussed with[de-identified] Patient and Lucila-daughter     Comments - Freedom of Choice: Both Pt and Deja Mary reported their acceptance of the offered SNF bed at [facility]  Contacts  Patient Contacts: Deja Mary  Relationship to Patient[de-identified] Family  Contact Method: Phone  Phone Number: [phone]  Reason/Outcome: Continuity of Care, Referral, Discharge Planning      Other Referral/Resources/Interventions Provided:  Referral Comments: Pt to be d/c to [facility] once medically cleared

## 2022-09-08 LAB
ANION GAP SERPL CALCULATED.3IONS-SCNC: 14 MMOL/L (ref 4–13)
BASOPHILS # BLD MANUAL: 0 THOUSAND/UL (ref 0–0.1)
BASOPHILS NFR MAR MANUAL: 0 % (ref 0–1)
BUN SERPL-MCNC: 32 MG/DL (ref 5–25)
CALCIUM SERPL-MCNC: 9.4 MG/DL (ref 8.4–10.2)
CHLORIDE SERPL-SCNC: 98 MMOL/L (ref 96–108)
CO2 SERPL-SCNC: 25 MMOL/L (ref 21–32)
CREAT SERPL-MCNC: 1 MG/DL (ref 0.6–1.3)
EOSINOPHIL # BLD MANUAL: 0 THOUSAND/UL (ref 0–0.4)
EOSINOPHIL NFR BLD MANUAL: 0 % (ref 0–6)
ERYTHROCYTE [DISTWIDTH] IN BLOOD BY AUTOMATED COUNT: 14.8 % (ref 11.6–15.1)
GFR SERPL CREATININE-BSD FRML MDRD: 71 ML/MIN/1.73SQ M
GLUCOSE SERPL-MCNC: 191 MG/DL (ref 65–140)
HCT VFR BLD AUTO: 40.7 % (ref 36.5–49.3)
HGB BLD-MCNC: 14.3 G/DL (ref 12–17)
LYMPHOCYTES # BLD AUTO: 16 % (ref 14–44)
LYMPHOCYTES # BLD AUTO: 2.36 THOUSAND/UL (ref 0.6–4.47)
MCH RBC QN AUTO: 33.4 PG (ref 26.8–34.3)
MCHC RBC AUTO-ENTMCNC: 35.1 G/DL (ref 31.4–37.4)
MCV RBC AUTO: 95 FL (ref 82–98)
METAMYELOCYTES NFR BLD MANUAL: 3 % (ref 0–1)
MONOCYTES # BLD AUTO: 0.89 THOUSAND/UL (ref 0–1.22)
MONOCYTES NFR BLD: 6 % (ref 4–12)
NEUTROPHILS # BLD MANUAL: 10.62 THOUSAND/UL (ref 1.85–7.62)
NEUTS SEG NFR BLD AUTO: 72 % (ref 43–75)
NRBC BLD AUTO-RTO: 1 /100 WBC (ref 0–2)
PLATELET # BLD AUTO: 199 THOUSANDS/UL (ref 149–390)
PLATELET BLD QL SMEAR: ADEQUATE
PMV BLD AUTO: 9.9 FL (ref 8.9–12.7)
POTASSIUM SERPL-SCNC: 4 MMOL/L (ref 3.5–5.3)
RBC # BLD AUTO: 4.28 MILLION/UL (ref 3.88–5.62)
RBC MORPH BLD: NORMAL
SODIUM SERPL-SCNC: 137 MMOL/L (ref 135–147)
VARIANT LYMPHS # BLD AUTO: 3 %
WBC # BLD AUTO: 14.75 THOUSAND/UL (ref 4.31–10.16)

## 2022-09-08 PROCEDURE — 99232 SBSQ HOSP IP/OBS MODERATE 35: CPT | Performed by: INTERNAL MEDICINE

## 2022-09-08 PROCEDURE — 94760 N-INVAS EAR/PLS OXIMETRY 1: CPT

## 2022-09-08 PROCEDURE — 94640 AIRWAY INHALATION TREATMENT: CPT

## 2022-09-08 PROCEDURE — 80048 BASIC METABOLIC PNL TOTAL CA: CPT

## 2022-09-08 PROCEDURE — 85007 BL SMEAR W/DIFF WBC COUNT: CPT

## 2022-09-08 PROCEDURE — 85027 COMPLETE CBC AUTOMATED: CPT

## 2022-09-08 PROCEDURE — 99232 SBSQ HOSP IP/OBS MODERATE 35: CPT | Performed by: HOSPITALIST

## 2022-09-08 RX ORDER — TRAZODONE HYDROCHLORIDE 50 MG/1
50 TABLET ORAL
Status: DISCONTINUED | OUTPATIENT
Start: 2022-09-08 | End: 2022-09-09

## 2022-09-08 RX ADMIN — LEVALBUTEROL HYDROCHLORIDE 1.25 MG: 1.25 SOLUTION, CONCENTRATE RESPIRATORY (INHALATION) at 07:09

## 2022-09-08 RX ADMIN — IPRATROPIUM BROMIDE 0.5 MG: 0.5 SOLUTION RESPIRATORY (INHALATION) at 07:08

## 2022-09-08 RX ADMIN — BUSPIRONE HYDROCHLORIDE 10 MG: 5 TABLET ORAL at 21:20

## 2022-09-08 RX ADMIN — METHYLPREDNISOLONE SODIUM SUCCINATE 40 MG: 40 INJECTION, POWDER, FOR SOLUTION INTRAMUSCULAR; INTRAVENOUS at 21:20

## 2022-09-08 RX ADMIN — FORMOTEROL FUMARATE DIHYDRATE 20 MCG: 20 SOLUTION RESPIRATORY (INHALATION) at 19:24

## 2022-09-08 RX ADMIN — IPRATROPIUM BROMIDE 0.5 MG: 0.5 SOLUTION RESPIRATORY (INHALATION) at 19:24

## 2022-09-08 RX ADMIN — TAMSULOSIN HYDROCHLORIDE 0.4 MG: 0.4 CAPSULE ORAL at 09:49

## 2022-09-08 RX ADMIN — BUSPIRONE HYDROCHLORIDE 10 MG: 5 TABLET ORAL at 16:23

## 2022-09-08 RX ADMIN — ROFLUMILAST 500 MCG: 250 TABLET ORAL at 09:49

## 2022-09-08 RX ADMIN — CHLORTHALIDONE 25 MG: 25 TABLET ORAL at 09:49

## 2022-09-08 RX ADMIN — BUDESONIDE 0.5 MG: 0.5 INHALANT ORAL at 19:24

## 2022-09-08 RX ADMIN — LEVALBUTEROL HYDROCHLORIDE 1.25 MG: 1.25 SOLUTION, CONCENTRATE RESPIRATORY (INHALATION) at 13:37

## 2022-09-08 RX ADMIN — MIRTAZAPINE 30 MG: 15 TABLET, FILM COATED ORAL at 21:20

## 2022-09-08 RX ADMIN — METHYLPREDNISOLONE SODIUM SUCCINATE 40 MG: 40 INJECTION, POWDER, FOR SOLUTION INTRAMUSCULAR; INTRAVENOUS at 09:48

## 2022-09-08 RX ADMIN — Medication 3 MG: at 21:19

## 2022-09-08 RX ADMIN — IPRATROPIUM BROMIDE 0.5 MG: 0.5 SOLUTION RESPIRATORY (INHALATION) at 13:37

## 2022-09-08 RX ADMIN — GABAPENTIN 300 MG: 300 CAPSULE ORAL at 16:23

## 2022-09-08 RX ADMIN — FINASTERIDE 5 MG: 5 TABLET, FILM COATED ORAL at 09:49

## 2022-09-08 RX ADMIN — AMLODIPINE BESYLATE 10 MG: 10 TABLET ORAL at 09:49

## 2022-09-08 RX ADMIN — GABAPENTIN 300 MG: 300 CAPSULE ORAL at 21:20

## 2022-09-08 RX ADMIN — BUDESONIDE 0.5 MG: 0.5 INHALANT ORAL at 07:09

## 2022-09-08 RX ADMIN — GABAPENTIN 300 MG: 300 CAPSULE ORAL at 09:49

## 2022-09-08 RX ADMIN — FORMOTEROL FUMARATE DIHYDRATE 20 MCG: 20 SOLUTION RESPIRATORY (INHALATION) at 07:08

## 2022-09-08 RX ADMIN — LEVALBUTEROL HYDROCHLORIDE 1.25 MG: 1.25 SOLUTION, CONCENTRATE RESPIRATORY (INHALATION) at 19:24

## 2022-09-08 RX ADMIN — BUSPIRONE HYDROCHLORIDE 10 MG: 5 TABLET ORAL at 09:49

## 2022-09-08 RX ADMIN — TRAZODONE HYDROCHLORIDE 50 MG: 50 TABLET ORAL at 21:19

## 2022-09-08 RX ADMIN — ENOXAPARIN SODIUM 40 MG: 40 INJECTION SUBCUTANEOUS at 09:49

## 2022-09-08 NOTE — PROGRESS NOTES
[facility]  Progress Note - Jonathan Lie 1945, 65 y o  male MRN: [id number]  Unit/Bed#: S -01 Encounter: [id number]  Primary Care Provider: Magnus Do MD   Date and time admitted to hospital: 9/3/2022  1:55 PM    * COPD with acute exacerbation [facility])  Assessment & Plan  Patient returning to the hospital for another severe exacerbation of his COPD  Follows with [facility]  Last admission 8/18 for exacerbation of COPD and was on tapered dose of prednisone and required azithromycin  Several readmissions in the past 6 months for COPD exacerbation  Workup for this admission shows no evidence of infection  WBC normal, chest x-ray no evidence of cardiopulmonary disease, COVID/flu/RSV negative  Patient clinically deteriorating, increase SOB with work of breathing at rest      Plan; pulmonology recomendations appreciated      Will transition to prednisone tomorrow 40 mg decreased by 10 mg q 3 days- might hold on this today and continue IV steriods    · Atrovent/Xopenex q i d  · Continue performist and Pulmicort  · Respiratory protocol, airway clearance, antitussives and incentive spirometry    · Appreciate Pulmonology recommendations  · Daliresp 500 mg daily   · Procal negative x2, No antibiotic requirements at this time  · Pulmonary rehab  · Palliative care consulted; [address] discussion given severe COPD with recurrent admissions- conversations on going      Acute on chronic respiratory failure ([facility] [address] )  Assessment & Plan    · Chronically on 3 L oxygen secondary to centrilobular emphysema and alpha 1 antitrypsin deficiency  · Increase oxygyen requirements, severe increase in work of breathing on minimal exertion  · Chest x-ray with no evidence of cardiopulmonary disease , CT chest severe emphysema, chronic PE changes  · Monitor oxygen requirements and keep SpO2 over 88%  · Currently on 5l, clinically deteriorating  · Continue incentive spirometry  · Treatment for COPD exacerbation as above  · Pulmonology on board      Ambulatory dysfunction  Assessment & Plan  · Multifactorial due to age, Parkinson's disease, peripheral neuropathy, chronic medical conditions   · Recently discharged from rehab, lives at home by himself, uses a walker  · Fall precautions  · PT/OT    Lactic acidosis  Assessment & Plan  · Low suspicion for sepsis  · 2/2 increased respiratory muscle use, deconditioning  · Received fluids overnight  · Trend levels until normal  · Lactic acid trending down    SIRS (systemic inflammatory response syndrome) ([facility] [address] )  Assessment & Plan  · Patient presented to the ED with tachycardia and tachypnea  · No fever, no evidence of infection at this time  · White blood cell normal, procalcitonin normal, blood cultures pending    Parkinson disease ([facility] [address] )  Assessment & Plan  Continue sinemet    Depression, recurrent (HCC)  Assessment & Plan  Continue Buspar 10 mg tid and mirtazapine 30 mg    Chronic pulmonary embolism ([facility] [address] )  Assessment & Plan  · Per chart review, patient completed 6-month course of Eliquis per oncology in 2018  If patient develops another acute PE then will need lifelong AC  · No need for systemic anticoagulation at this time  · No new finding of acute PE with current SOB      Essential hypertension  Assessment & Plan  Continue home medications  BP stable    Alpha-1-antitrypsin deficiency (HCC)  Assessment & Plan  · On Zemaira infusion per pulmonology  · Unable to get his infusions for the past 1 month because he was hospitalized and then had rehab           VTE Pharmacologic Prophylaxis: VTE Score: 11 High Risk (Score >/= 5) - Pharmacological DVT Prophylaxis Ordered: enoxaparin (Lovenox)  Sequential Compression Devices Ordered  Patient Centered Rounds: I performed bedside rounds with nursing staff today    Discussions with Specialists or Other Care Team Provider: pulmonoology    Education and Discussions with Family / Patient: Updated  (daughter) via phone     Current Length of Stay: 5 day(s)  Current Patient Status: Inpatient   Discharge Plan: Anticipate discharge in 24-48 hrs to rehab facility  Code Status: Level 1 - Full Code    Subjective:   Patient seen this morning, looks clinically unwell with increased shortness of breath, unable to make full sentences  Has increase oxygen currently at 5L to maintain sat above 92%  No chest pain, unable to eat or move to the bath without significant increase in work of breathing    Objective:     Vitals:   Temp (24hrs), Av 9 °F (36 6 °C), Min:97 8 °F (36 6 °C), Max:98 °F (36 7 °C)    Temp:  [97 8 °F (36 6 °C)-98 °F (36 7 °C)] 97 9 °F (36 6 °C)  HR:  [87-99] 99  Resp:  [16-22] 16  BP: (130-151)/(68-85) 137/71  SpO2:  [95 %-99 %] 97 %  There is no height or weight on file to calculate BMI  Input and Output Summary (last 24 hours): Intake/Output Summary (Last 24 hours) at 2022 1427  Last data filed at 2022 1104  Gross per 24 hour   Intake --   Output 2450 ml   Net -2450 ml       Physical Exam:   Physical Exam  Constitutional:       Appearance: He is not ill-appearing  HENT:      Head: Normocephalic and atraumatic  Mouth/Throat:      Mouth: Mucous membranes are moist    Eyes:      General:         Right eye: No discharge  Cardiovascular:      Rate and Rhythm: Normal rate and regular rhythm  Pulmonary:      Effort: Respiratory distress (with decreased breath sounds) present  Breath sounds: No stridor  No rhonchi or rales  Abdominal:      General: There is no distension  Palpations: Abdomen is soft  Tenderness: There is no abdominal tenderness  Skin:     General: Skin is warm  Neurological:      General: No focal deficit present  Mental Status: He is alert           Additional Data:     Labs:  Results from last 7 days   Lab Units 22  0832 22  0610 22  0629 22  0629   WBC Thousand/uL 14 75* 10 12  --  10 29*   HEMOGLOBIN g/dL 14 3 13 2  --  11 9* HEMATOCRIT % 40 7 37 7  --  34 8*   PLATELETS Thousands/uL 199 150  --  124*   BANDS PCT %  --  1  --   --    NEUTROS PCT %  --   --   --  85*   LYMPHS PCT %  --   --   --  10*   LYMPHO PCT % 16 18   < >  --    MONOS PCT %  --   --   --  3*   MONO PCT % 6 3*   < >  --    EOS PCT % 0 0   < > 0    < > = values in this interval not displayed  Results from last 7 days   Lab Units 09/08/22  0832 09/04/22  0426 09/03/22  1454   SODIUM mmol/L 137   < > 137   POTASSIUM mmol/L 4 0   < > 3 2*   CHLORIDE mmol/L 98   < > 97   CO2 mmol/L 25   < > 30   BUN mg/dL 32*   < > 29*   CREATININE mg/dL 1 00   < > 1 13   ANION GAP mmol/L 14*   < > 10   CALCIUM mg/dL 9 4   < > 9 1   ALBUMIN g/dL  --   --  4 1   TOTAL BILIRUBIN mg/dL  --   --  1 33*   ALK PHOS U/L  --   --  61   ALT U/L  --   --  32   AST U/L  --   --  21   GLUCOSE RANDOM mg/dL 191*   < > 142*    < > = values in this interval not displayed  Results from last 7 days   Lab Units 09/04/22  0426   INR  1 03             Results from last 7 days   Lab Units 09/06/22  0629 09/05/22  0932 09/04/22  1733 09/04/22  1357 09/04/22  0426 09/03/22  1658 09/03/22  1454   LACTIC ACID mmol/L 3 1* 6 0* 5 7* 5 1*  --    < > 2 8*   PROCALCITONIN ng/ml  --   --   --   --  0 13  --  0 15    < > = values in this interval not displayed  Lines/Drains:  Invasive Devices  Report    Peripheral Intravenous Line  Duration           Peripheral IV 09/05/22 Distal;Dorsal (posterior); Left Forearm 2 days    Peripheral IV 09/07/22 Left Antecubital 1 day                      Imaging: Personally reviewed the following imaging: chest CT scan    Recent Cultures (last 7 days):   Results from last 7 days   Lab Units 09/03/22  1454   BLOOD CULTURE  No Growth After 4 Days  No Growth After 4 Days         Last 24 Hours Medication List:   Current Facility-Administered Medications   Medication Dose Route Frequency Provider Last Rate    albuterol  2 5 mg Nebulization Q4H PRN Caity Pierre, MD      amLODIPine  10 mg Oral Daily Syeda Ellis MD      budesonide  0 5 mg Nebulization Q12H Syeda Ellis MD      busPIRone  10 mg Oral TID Syeda Ellis MD      chlorthalidone  25 mg Oral Daily Syeda Ellis MD      enoxaparin  40 mg Subcutaneous Daily Syeda Ellis MD      finasteride  5 mg Oral QAM Syeda Ellis MD      formoterol  20 mcg Nebulization Q12H Syeda Ellis MD      gabapentin  300 mg Oral TID Freddy Ortiz MD      ipratropium  0 5 mg Nebulization TID Syeda Ellis MD      levalbuterol  1 25 mg Nebulization TID Syeda Ellis MD      melatonin  3 mg Oral HS Cecilia Eden DO      methylPREDNISolone sodium succinate  40 mg Intravenous Q12H Paola Pérez MD      mirtazapine  30 mg Oral HS Syeda Ellis MD      roflumilast  500 mcg Oral Daily Eddie Delgado MD      tamsulosin  0 4 mg Oral Daily Syeda Ellis MD          Today, Patient Was Seen By: Syeda Ellis MD    **Please Note: This note may have been constructed using a voice recognition system  **

## 2022-09-08 NOTE — ASSESSMENT & PLAN NOTE
· Chronically on 3 L oxygen secondary to centrilobular emphysema and alpha 1 antitrypsin deficiency  · Increase oxygyen requirements, severe increase in work of breathing on minimal exertion  · Chest x-ray with no evidence of cardiopulmonary disease , CT chest severe emphysema, chronic PE changes  · Monitor oxygen requirements and keep SpO2 over 88%  · Currently on 5l, clinically deteriorating  · Continue incentive spirometry  · Treatment for COPD exacerbation as above  · Pulmonology on board

## 2022-09-08 NOTE — PROGRESS NOTES
Progress Note - Pulmonary   [place] 65 [place]  [place] MRN: [id number]  Unit/Bed#: S -01 Encounter: [id number]    Final plan pending attending attestation    Assessment/Plan:      1  Acute pulmonary insufficiency on chronic hypoxic respiratory failure      -  currently on 5L O2 saturating at 95%, greater than his home requirement of 3 L      -  maintain saturations greater than 88%      - recommend physical rehabilitation to help him regain strength and allow him to maintain his independence  He is concerned that he may not have enough time at physical rehabilitation to meet this goal        -  pulmonary toileting:  Deep breathing cough, OOB , IS Q 1 hr     2  Severe COPD with acute exacerbation       -  Inpatient:  Continue IV Solu-Medrol 40 mg b i d , Perforomist b i d , Xopenex/Atrovent t i d         -  home regimen:  Budesonide/Perforomist b i d , Ventolin 2 puffs Q 4 p r n , Xopenex/Atrovent t i d , Daliresp 500 mg daily       -  palliative following     3  Alpha-1 antitrypsin deficiency       -  unfortunately has missed weekly replacement therapy with alpha-1 proteinase inhibitor x 1 month       - states that he has missed these doses because he has been in the hospital often, only visiting home for a couple days before returning to the hospital        - recommend restarting immediately upon discharge       -  administered per visiting nurse     4  H/O PE       -   repeat CTA re-demonstrated chronic left lower lobe PE, diffuse to have been there for over 2 years despite not being anticoagulated  -  completed 6 months of anticoagulation in the past, no anticoagulation necessary at this time              Chief Complaint:   "Feeling extremely winded"    Subjective:   No acute events overnight  This morning the patient states that he is feeling extremely winded on 5 L of oxygen via nasal cannula, and that this has been persistent this morning    He intermittently felt this winded yesterday, though he was on 3 L to 4 L most of the day  He has also been feeling some wheezing, but no productive cough, no chest pains, no abdominal pains, no nausea  Overall, he is feeling slightly worse compared to yesterday  He did receive his nebulizer treatments this morning, though he does not feel that they were effective in improving his breathing  Objective:     Vitals: Blood pressure 137/71, pulse 99, temperature 97 9 °F (36 6 °C), temperature source Oral, resp  rate 16, SpO2 95 %  ,There is no height or weight on file to calculate BMI  Intake/Output Summary (Last 24 hours) at 9/8/2022 1045  Last data filed at 9/8/2022 0754  Gross per 24 hour   Intake --   Output 2150 ml   Net -2150 ml       Invasive Devices  Report    Peripheral Intravenous Line  Duration           Peripheral IV 09/05/22 Distal;Dorsal (posterior); Left Forearm 2 days    Peripheral IV 09/07/22 Left Antecubital <1 day                Physical Exam: /71   Pulse 99   Temp 97 9 °F (36 6 °C) (Oral)   Resp 16   SpO2 95%        General Appearance:    Alert, cooperative, no distress, appears stated age   Head:    Normocephalic, without obvious abnormality, atraumatic   Eyes:    PERRL, conjunctiva/corneas clear, both eyes        Ears:    Normal external ear canals, both ears   Nose:   Nares normal, septum midline, mucosa normal, no drainage    or sinus tenderness   Throat:   Lips, mucosa, and tongue normal; teeth and gums normal   Neck:   Supple, symmetrical, trachea midline   Lungs:      pauses when speaking to take breaths,  overall decreased breath sounds,  no wheezing, no rhonchi, no crackles     Chest wall:    No tenderness or deformity   Heart:    Regular rate and rhythm, S1 and S2 normal, no murmur, rub   or gallop   Abdomen:     Distended, tense, rounded, non-tender, bowel sounds active all four quadrants, no masses, no organomegaly   Extremities:   Extremities normal, atraumatic, no cyanosis or edema   Pulses:   2+ and symmetric all extremities   Skin:   Skin color, texture, turgor normal, no rashes or lesions         Labs: I have personally reviewed pertinent lab results  Imaging and other studies: I have personally reviewed pertinent reports

## 2022-09-08 NOTE — ASSESSMENT & PLAN NOTE
Patient returning to the hospital for another severe exacerbation of his COPD  Follows with [facility]ks  Last admission 8/18 for exacerbation of COPD and was on tapered dose of prednisone and required azithromycin  Several readmissions in the past 6 months for COPD exacerbation  Workup for this admission shows no evidence of infection  WBC normal, chest x-ray no evidence of cardiopulmonary disease, COVID/flu/RSV negative  Patient clinically deteriorating, increase SOB with work of breathing at rest      Plan; pulmonology recomendations appreciated      Will transition to prednisone tomorrow 40 mg decreased by 10 mg q 3 days- might hold on this today and continue IV steriods    · Atrovent/Xopenex q i d  · Continue performist and Pulmicort  · Respiratory protocol, airway clearance, antitussives and incentive spirometry    · Appreciate Pulmonology recommendations  · Daliresp 500 mg daily   · Procal negative x2, No antibiotic requirements at this time  · Pulmonary rehab  · Palliative care consulted; [address] discussion given severe COPD with recurrent admissions- conversations on going

## 2022-09-08 NOTE — ASSESSMENT & PLAN NOTE
· Per chart review, patient completed 6-month course of Eliquis per oncology in 2018    If patient develops another acute PE then will need lifelong AC  · No need for systemic anticoagulation at this time  · No new finding of acute PE with current SOB

## 2022-09-08 NOTE — PLAN OF CARE
Problem: MOBILITY - ADULT  Goal: Maintain or return to baseline ADL function  Description: INTERVENTIONS:  -  Assess patient's ability to carry out ADLs; assess patient's baseline for ADL function and identify physical deficits which impact ability to perform ADLs (bathing, care of mouth/teeth, toileting, grooming, dressing, etc )  - Assess/evaluate cause of self-care deficits   - Assess range of motion  - Assess patient's mobility; develop plan if impaired  - Assess patient's need for assistive devices and provide as appropriate  - Encourage maximum independence but intervene and supervise when necessary  - Involve family in performance of ADLs  - Assess for home care needs following discharge   - Consider OT consult to assist with ADL evaluation and planning for discharge  - Provide patient education as appropriate  Outcome: Progressing     Problem: Potential for Falls  Goal: Patient will remain free of falls  Description: INTERVENTIONS:  - Educate patient/family on patient safety including physical limitations  - Instruct patient to call for assistance with activity   - Consult OT/PT to assist with strengthening/mobility   - Keep Call bell within reach  - Keep bed low and locked with side rails adjusted as appropriate  - Keep care items and personal belongings within reach  - Initiate and maintain comfort rounds  - Make Fall Risk Sign visible to staff  - Offer Toileting every 2 Hours, in advance of need  - Initiate/Maintain bed and chair alarm  - Obtain necessary fall risk management equipment: fall cushion   - Apply yellow socks and bracelet for high fall risk patients  - Consider moving patient to room near nurses station  Outcome: Progressing     Problem: Prexisting or High Potential for Compromised Skin Integrity  Goal: Skin integrity is maintained or improved  Description: INTERVENTIONS:  - Identify patients at risk for skin breakdown  - Assess and monitor skin integrity  - Assess and monitor nutrition and hydration status  - Monitor labs   - Assess for incontinence   - Turn and reposition patient  - Assist with mobility/ambulation  - Relieve pressure over bony prominences  - Avoid friction and shearing  - Provide appropriate hygiene as needed including keeping skin clean and dry  - Evaluate need for skin moisturizer/barrier cream  - Collaborate with interdisciplinary team   - Patient/family teaching  - Consider wound care consult   Outcome: Progressing     Problem: RESPIRATORY - ADULT  Goal: Achieves optimal ventilation and oxygenation  Description: INTERVENTIONS:  - Assess for changes in respiratory status  - Assess for changes in mentation and behavior  - Position to facilitate oxygenation and minimize respiratory effort  - Oxygen administered by appropriate delivery if ordered  - Initiate smoking cessation education as indicated  - Encourage broncho-pulmonary hygiene including cough, deep breathe, Incentive Spirometry  - Assess the need for suctioning and aspirate as needed  - Assess and instruct to report SOB or any respiratory difficulty  - Respiratory Therapy support as indicated  Outcome: Progressing     Problem: DISCHARGE PLANNING  Goal: Discharge to home or other facility with appropriate resources  Description: INTERVENTIONS:  - Identify barriers to discharge w/patient and caregiver  - Arrange for needed discharge resources and transportation as appropriate  - Identify discharge learning needs (meds, wound care, etc )  - Arrange for interpretive services to assist at discharge as needed  - Refer to Case Management Department for coordinating discharge planning if the patient needs post-hospital services based on physician/advanced practitioner order or complex needs related to functional status, cognitive ability, or social support system  Outcome: Progressing

## 2022-09-09 ENCOUNTER — APPOINTMENT (OUTPATIENT)
Dept: RADIOLOGY | Facility: HOSPITAL | Age: 77
DRG: 190 | End: 2022-09-09
Payer: MEDICARE

## 2022-09-09 LAB
ANION GAP SERPL CALCULATED.3IONS-SCNC: 8 MMOL/L (ref 4–13)
BACTERIA BLD CULT: NORMAL
BACTERIA BLD CULT: NORMAL
BASOPHILS # BLD MANUAL: 0 THOUSAND/UL (ref 0–0.1)
BASOPHILS NFR MAR MANUAL: 0 % (ref 0–1)
BUN SERPL-MCNC: 34 MG/DL (ref 5–25)
CALCIUM SERPL-MCNC: 8.8 MG/DL (ref 8.4–10.2)
CHLORIDE SERPL-SCNC: 99 MMOL/L (ref 96–108)
CO2 SERPL-SCNC: 28 MMOL/L (ref 21–32)
CREAT SERPL-MCNC: 0.96 MG/DL (ref 0.6–1.3)
EOSINOPHIL # BLD MANUAL: 0 THOUSAND/UL (ref 0–0.4)
EOSINOPHIL NFR BLD MANUAL: 0 % (ref 0–6)
ERYTHROCYTE [DISTWIDTH] IN BLOOD BY AUTOMATED COUNT: 14.7 % (ref 11.6–15.1)
GFR SERPL CREATININE-BSD FRML MDRD: 75 ML/MIN/1.73SQ M
GLUCOSE SERPL-MCNC: 165 MG/DL (ref 65–140)
HCT VFR BLD AUTO: 34.4 % (ref 36.5–49.3)
HGB BLD-MCNC: 12.2 G/DL (ref 12–17)
LACTATE SERPL-SCNC: 3.2 MMOL/L (ref 0.5–2)
LYMPHOCYTES # BLD AUTO: 1.38 THOUSAND/UL (ref 0.6–4.47)
LYMPHOCYTES # BLD AUTO: 18 % (ref 14–44)
MCH RBC QN AUTO: 33.9 PG (ref 26.8–34.3)
MCHC RBC AUTO-ENTMCNC: 35.5 G/DL (ref 31.4–37.4)
MCV RBC AUTO: 96 FL (ref 82–98)
MONOCYTES # BLD AUTO: 0.31 THOUSAND/UL (ref 0–1.22)
MONOCYTES NFR BLD: 4 % (ref 4–12)
NEUTROPHILS # BLD MANUAL: 5.97 THOUSAND/UL (ref 1.85–7.62)
NEUTS BAND NFR BLD MANUAL: 1 % (ref 0–8)
NEUTS SEG NFR BLD AUTO: 77 % (ref 43–75)
PLATELET # BLD AUTO: 125 THOUSANDS/UL (ref 149–390)
PLATELET BLD QL SMEAR: ABNORMAL
PMV BLD AUTO: 9.5 FL (ref 8.9–12.7)
POTASSIUM SERPL-SCNC: 4.2 MMOL/L (ref 3.5–5.3)
RBC # BLD AUTO: 3.6 MILLION/UL (ref 3.88–5.62)
RBC MORPH BLD: NORMAL
SODIUM SERPL-SCNC: 135 MMOL/L (ref 135–147)
WBC # BLD AUTO: 7.65 THOUSAND/UL (ref 4.31–10.16)

## 2022-09-09 PROCEDURE — 76000 FLUOROSCOPY <1 HR PHYS/QHP: CPT

## 2022-09-09 PROCEDURE — 83605 ASSAY OF LACTIC ACID: CPT | Performed by: INTERNAL MEDICINE

## 2022-09-09 PROCEDURE — 94760 N-INVAS EAR/PLS OXIMETRY 1: CPT

## 2022-09-09 PROCEDURE — 85027 COMPLETE CBC AUTOMATED: CPT

## 2022-09-09 PROCEDURE — 80048 BASIC METABOLIC PNL TOTAL CA: CPT

## 2022-09-09 PROCEDURE — 99232 SBSQ HOSP IP/OBS MODERATE 35: CPT | Performed by: INTERNAL MEDICINE

## 2022-09-09 PROCEDURE — 94640 AIRWAY INHALATION TREATMENT: CPT

## 2022-09-09 PROCEDURE — 85007 BL SMEAR W/DIFF WBC COUNT: CPT

## 2022-09-09 PROCEDURE — 99232 SBSQ HOSP IP/OBS MODERATE 35: CPT | Performed by: HOSPITALIST

## 2022-09-09 PROCEDURE — 94150 VITAL CAPACITY TEST: CPT

## 2022-09-09 PROCEDURE — 94664 DEMO&/EVAL PT USE INHALER: CPT

## 2022-09-09 PROCEDURE — 94668 MNPJ CHEST WALL SBSQ: CPT

## 2022-09-09 RX ORDER — METHYLPREDNISOLONE SODIUM SUCCINATE 40 MG/ML
20 INJECTION, POWDER, LYOPHILIZED, FOR SOLUTION INTRAMUSCULAR; INTRAVENOUS EVERY 12 HOURS SCHEDULED
Status: DISCONTINUED | OUTPATIENT
Start: 2022-09-09 | End: 2022-09-10

## 2022-09-09 RX ORDER — ONDANSETRON 2 MG/ML
4 INJECTION INTRAMUSCULAR; INTRAVENOUS EVERY 8 HOURS PRN
Status: DISCONTINUED | OUTPATIENT
Start: 2022-09-09 | End: 2022-09-09

## 2022-09-09 RX ORDER — FAMOTIDINE 10 MG/ML
20 INJECTION, SOLUTION INTRAVENOUS EVERY 12 HOURS SCHEDULED
Status: DISCONTINUED | OUTPATIENT
Start: 2022-09-09 | End: 2022-09-12

## 2022-09-09 RX ORDER — CALCIUM CARBONATE 200(500)MG
1000 TABLET,CHEWABLE ORAL 3 TIMES DAILY PRN
Status: DISCONTINUED | OUTPATIENT
Start: 2022-09-09 | End: 2022-09-12 | Stop reason: HOSPADM

## 2022-09-09 RX ORDER — TRAZODONE HYDROCHLORIDE 100 MG/1
100 TABLET ORAL
Status: DISCONTINUED | OUTPATIENT
Start: 2022-09-09 | End: 2022-09-12 | Stop reason: HOSPADM

## 2022-09-09 RX ORDER — ONDANSETRON 2 MG/ML
4 INJECTION INTRAMUSCULAR; INTRAVENOUS EVERY 4 HOURS PRN
Status: DISCONTINUED | OUTPATIENT
Start: 2022-09-09 | End: 2022-09-12 | Stop reason: HOSPADM

## 2022-09-09 RX ADMIN — FINASTERIDE 5 MG: 5 TABLET, FILM COATED ORAL at 08:46

## 2022-09-09 RX ADMIN — BUSPIRONE HYDROCHLORIDE 10 MG: 5 TABLET ORAL at 21:26

## 2022-09-09 RX ADMIN — FORMOTEROL FUMARATE DIHYDRATE 20 MCG: 20 SOLUTION RESPIRATORY (INHALATION) at 07:07

## 2022-09-09 RX ADMIN — CHLORTHALIDONE 25 MG: 25 TABLET ORAL at 08:47

## 2022-09-09 RX ADMIN — Medication 3 MG: at 21:26

## 2022-09-09 RX ADMIN — BUDESONIDE 0.5 MG: 0.5 INHALANT ORAL at 07:07

## 2022-09-09 RX ADMIN — IPRATROPIUM BROMIDE 0.5 MG: 0.5 SOLUTION RESPIRATORY (INHALATION) at 07:07

## 2022-09-09 RX ADMIN — ONDANSETRON 4 MG: 2 INJECTION INTRAMUSCULAR; INTRAVENOUS at 21:34

## 2022-09-09 RX ADMIN — LEVALBUTEROL HYDROCHLORIDE 1.25 MG: 1.25 SOLUTION, CONCENTRATE RESPIRATORY (INHALATION) at 19:45

## 2022-09-09 RX ADMIN — GABAPENTIN 300 MG: 300 CAPSULE ORAL at 21:26

## 2022-09-09 RX ADMIN — LEVALBUTEROL HYDROCHLORIDE 1.25 MG: 1.25 SOLUTION, CONCENTRATE RESPIRATORY (INHALATION) at 13:19

## 2022-09-09 RX ADMIN — FORMOTEROL FUMARATE DIHYDRATE 20 MCG: 20 SOLUTION RESPIRATORY (INHALATION) at 19:44

## 2022-09-09 RX ADMIN — BUDESONIDE 0.5 MG: 0.5 INHALANT ORAL at 19:45

## 2022-09-09 RX ADMIN — BUSPIRONE HYDROCHLORIDE 10 MG: 5 TABLET ORAL at 08:46

## 2022-09-09 RX ADMIN — ROFLUMILAST 500 MCG: 250 TABLET ORAL at 08:48

## 2022-09-09 RX ADMIN — AMLODIPINE BESYLATE 10 MG: 10 TABLET ORAL at 08:46

## 2022-09-09 RX ADMIN — GABAPENTIN 300 MG: 300 CAPSULE ORAL at 08:46

## 2022-09-09 RX ADMIN — BUSPIRONE HYDROCHLORIDE 10 MG: 5 TABLET ORAL at 15:44

## 2022-09-09 RX ADMIN — TAMSULOSIN HYDROCHLORIDE 0.4 MG: 0.4 CAPSULE ORAL at 08:46

## 2022-09-09 RX ADMIN — IPRATROPIUM BROMIDE 0.5 MG: 0.5 SOLUTION RESPIRATORY (INHALATION) at 19:45

## 2022-09-09 RX ADMIN — ENOXAPARIN SODIUM 40 MG: 40 INJECTION SUBCUTANEOUS at 08:47

## 2022-09-09 RX ADMIN — FAMOTIDINE 20 MG: 10 INJECTION, SOLUTION INTRAVENOUS at 21:26

## 2022-09-09 RX ADMIN — MIRTAZAPINE 30 MG: 15 TABLET, FILM COATED ORAL at 21:26

## 2022-09-09 RX ADMIN — GABAPENTIN 300 MG: 300 CAPSULE ORAL at 15:44

## 2022-09-09 RX ADMIN — LEVALBUTEROL HYDROCHLORIDE 1.25 MG: 1.25 SOLUTION, CONCENTRATE RESPIRATORY (INHALATION) at 07:07

## 2022-09-09 RX ADMIN — IPRATROPIUM BROMIDE 0.5 MG: 0.5 SOLUTION RESPIRATORY (INHALATION) at 13:19

## 2022-09-09 RX ADMIN — ONDANSETRON 4 MG: 2 INJECTION INTRAMUSCULAR; INTRAVENOUS at 13:13

## 2022-09-09 RX ADMIN — TRAZODONE HYDROCHLORIDE 100 MG: 100 TABLET ORAL at 21:26

## 2022-09-09 RX ADMIN — METHYLPREDNISOLONE SODIUM SUCCINATE 40 MG: 40 INJECTION, POWDER, FOR SOLUTION INTRAMUSCULAR; INTRAVENOUS at 08:46

## 2022-09-09 RX ADMIN — METHYLPREDNISOLONE SODIUM SUCCINATE 20 MG: 40 INJECTION, POWDER, FOR SOLUTION INTRAMUSCULAR; INTRAVENOUS at 21:26

## 2022-09-09 NOTE — PLAN OF CARE
Problem: Potential for Falls  Goal: Patient will remain free of falls  Description: INTERVENTIONS:  - Educate patient/family on patient safety including physical limitations  - Instruct patient to call for assistance with activity   - Consult OT/PT to assist with strengthening/mobility   - Keep Call bell within reach  - Keep bed low and locked with side rails adjusted as appropriate  - Keep care items and personal belongings within reach  - Initiate and maintain comfort rounds  - Make Fall Risk Sign visible to staff  - Offer Toileting every Hours, in advance of need  - Initiate/Maintain alarm  - Obtain necessary fall risk management equipment: standard walker  - Apply yellow socks and bracelet for high fall risk patients  - Consider moving patient to room near nurses station  Outcome: Progressing     Problem: MOBILITY - ADULT  Goal: Maintain or return to baseline ADL function  Description: INTERVENTIONS:  -  Assess patient's ability to carry out ADLs; assess patient's baseline for ADL function and identify physical deficits which impact ability to perform ADLs (bathing, care of mouth/teeth, toileting, grooming, dressing, etc )  - Assess/evaluate cause of self-care deficits   - Assess range of motion  - Assess patient's mobility; develop plan if impaired  - Assess patient's need for assistive devices and provide as appropriate  - Encourage maximum independence but intervene and supervise when necessary  - Involve family in performance of ADLs  - Assess for home care needs following discharge   - Consider OT consult to assist with ADL evaluation and planning for discharge  - Provide patient education as appropriate  Outcome: Progressing     Problem: Prexisting or High Potential for Compromised Skin Integrity  Goal: Skin integrity is maintained or improved  Description: INTERVENTIONS:  - Identify patients at risk for skin breakdown  - Assess and monitor skin integrity  - Assess and monitor nutrition and hydration status  - Monitor labs   - Assess for incontinence   - Turn and reposition patient  - Assist with mobility/ambulation  - Relieve pressure over bony prominences  - Avoid friction and shearing  - Provide appropriate hygiene as needed including keeping skin clean and dry  - Evaluate need for skin moisturizer/barrier cream  - Collaborate with interdisciplinary team   - Patient/family teaching  - Consider wound care consult   Outcome: Progressing     Problem: RESPIRATORY - ADULT  Goal: Achieves optimal ventilation and oxygenation  Description: INTERVENTIONS:  - Assess for changes in respiratory status  - Assess for changes in mentation and behavior  - Position to facilitate oxygenation and minimize respiratory effort  - Oxygen administered by appropriate delivery if ordered  - Initiate smoking cessation education as indicated  - Encourage broncho-pulmonary hygiene including cough, deep breathe, Incentive Spirometry  - Assess the need for suctioning and aspirate as needed  - Assess and instruct to report SOB or any respiratory difficulty  - Respiratory Therapy support as indicated  Outcome: Progressing

## 2022-09-09 NOTE — PROGRESS NOTES
Progress Note - Pulmonary   Marea Corinne 65 y o  male MRN: [id number]  Unit/Bed#: S -01 Encounter: [id number]    Final plan pending attending attestation     Assessment/Plan:        1  Acute pulmonary insufficiency on chronic hypoxic respiratory failure      -  currently on 5L O2 saturating at 97%, greater than his home requirement of 3 L      -  maintain saturations greater than 88%      - recommend physical rehabilitation to help him regain strength and allow him to maintain his independence  He is concerned that he may not have enough time at physical rehabilitation to meet this goal        -  pulmonary toileting:  Deep breathing cough, OOB , IS Q 1 hr      - continued dyspnea is concerning considering his current COPD treatments, further evaluating the following tests:   -vital capacity mechanics - pending   -FL Sniff test - pending   -Echo bubble study - pending   -repeat lactic acid - pending     2  Severe COPD with acute exacerbation       -  Inpatient:   Perforomist b i d , Xopenex/Atrovent t i d  Decreased IV Solu-Medrol from 40 mg b i d   To 20 mg b i d         -  home regimen:  Budesonide/Perforomist b i d , Ventolin 2 puffs Q 4 p r n , Xopenex/Atrovent t i d , Daliresp 500 mg daily       -  palliative following     3  Alpha-1 antitrypsin deficiency       -  unfortunately has missed weekly replacement therapy with alpha-1 proteinase inhibitor x 1 month       - states that he has missed these doses because he has been in the hospital often, only visiting home for a couple days before returning to the hospital        - recommend restarting immediately upon discharge       -  administered per visiting nurse     4  H/O PE       -   repeat CTA re-demonstrated chronic left lower lobe PE, has been there for over 2 years       -  completed 6 months of anticoagulation in the past, no anticoagulation necessary at this time       Chief Complaint:   "I still feel short of breath"    Subjective:   No acute events overnight  Speaking with patient this morning, he said that he was unable to sleep last night because of shortness of breath, and that he felt about the same as yesterday as far as his breathing is concerned  He did note that it is slightly easier to breathe while sitting at 5 L of oxygen today compared to yesterday, though his breathing quickly worsens whenever he is active, including transferring to the bedside chair  At home he lives alone, and needs to be able to move around his home on his own  Prior to coming in the hospital, he has not been able to ambulate well, required sitting breaks in the middle of moving about his tasks at home because of shortness of breath  Objective:     Vitals: Blood pressure 147/85, pulse 86, temperature (!) 96 6 °F (35 9 °C), temperature source Axillary, resp  rate 16, SpO2 97 %  ,There is no height or weight on file to calculate BMI  Intake/Output Summary (Last 24 hours) at 9/9/2022 0955  Last data filed at 9/9/2022 0721  Gross per 24 hour   Intake --   Output 2000 ml   Net -2000 ml       Invasive Devices  Report    Peripheral Intravenous Line  Duration           Peripheral IV 09/05/22 Distal;Dorsal (posterior); Left Forearm 3 days    Peripheral IV 09/07/22 Left Antecubital 1 day                Physical Exam: /85   Pulse 86   Temp (!) 96 6 °F (35 9 °C) (Axillary)   Resp 16   SpO2 97%     General Appearance:    Alert, cooperative, no distress, appears stated age   Head:    Normocephalic, without obvious abnormality, atraumatic   Eyes:    PERRL, conjunctiva/corneas clear, EOM's intact, fundi     benign, both eyes        Ears:    Normal TM's and external ear canals, both ears   Nose:   Nares normal, septum midline, mucosa normal, no drainage    or sinus tenderness   Throat:   Lips, mucosa, and tongue normal; teeth and gums normal   Neck:   Supple, symmetrical, trachea midline, no adenopathy;        thyroid:  No enlargement/tenderness/nodules; no carotid    bruit or JVD   Back:     Symmetric, no curvature, ROM normal, no CVA tenderness   Lungs:     Clear to auscultation bilaterally, respirations unlabored   Chest wall:    No tenderness or deformity   Heart:    Regular rate and rhythm, S1 and S2 normal, no murmur, rub   or gallop   Abdomen:     Soft, non-tender, bowel sounds active all four quadrants,     no masses, no organomegaly   Genitalia:    Normal male without lesion, discharge or tenderness   Rectal:    Normal tone, normal prostate, no masses or tenderness;    guaiac negative stool   Extremities:   Extremities normal, atraumatic, no cyanosis or edema   Pulses:   2+ and symmetric all extremities   Skin:   Skin color, texture, turgor normal, no rashes or lesions   Lymph nodes:   Cervical, supraclavicular, and axillary nodes normal   Neurologic:   CNII-XII intact  Normal strength, sensation and reflexes       throughout        Labs: I have personally reviewed pertinent lab results  Imaging and other studies: I have personally reviewed pertinent reports

## 2022-09-09 NOTE — PLAN OF CARE
Problem: MOBILITY - ADULT  Goal: Maintain or return to baseline ADL function  Description: INTERVENTIONS:  -  Assess patient's ability to carry out ADLs; assess patient's baseline for ADL function and identify physical deficits which impact ability to perform ADLs (bathing, care of mouth/teeth, toileting, grooming, dressing, etc )  - Assess/evaluate cause of self-care deficits   - Assess range of motion  - Assess patient's mobility; develop plan if impaired  - Assess patient's need for assistive devices and provide as appropriate  - Encourage maximum independence but intervene and supervise when necessary  - Involve family in performance of ADLs  - Assess for home care needs following discharge   - Consider OT consult to assist with ADL evaluation and planning for discharge  - Provide patient education as appropriate  Outcome: Progressing  Goal: Maintains/Returns to pre admission functional level  Description: INTERVENTIONS:  - Perform BMAT or MOVE assessment daily    - Set and communicate daily mobility goal to care team and patient/family/caregiver  - Collaborate with rehabilitation services on mobility goals if consulted  - Perform Range of Motion 2 times a day  - Reposition patient every 2 hours    - Dangle patient 2 times a day  - Stand patient 2 times a day  - Ambulate patient 3 times a day  - Out of bed to chair 3 times a day   - Out of bed for meals 3 times a day  - Out of bed for toileting  - Record patient progress and toleration of activity level   Outcome: Progressing     Problem: Potential for Falls  Goal: Patient will remain free of falls  Description: INTERVENTIONS:  - Educate patient/family on patient safety including physical limitations  - Instruct patient to call for assistance with activity   - Consult OT/PT to assist with strengthening/mobility   - Keep Call bell within reach  - Keep bed low and locked with side rails adjusted as appropriate  - Keep care items and personal belongings within reach  - Initiate and maintain comfort rounds  - Make Fall Risk Sign visible to staff  - Offer Toileting every 2 Hours, in advance of need  - Initiate/Maintain bed alarm  - Obtain necessary fall risk management equipment  - Apply yellow socks and bracelet for high fall risk patients  - Consider moving patient to room near nurses station  Outcome: Progressing     Problem: Prexisting or High Potential for Compromised Skin Integrity  Goal: Skin integrity is maintained or improved  Description: INTERVENTIONS:  - Identify patients at risk for skin breakdown  - Assess and monitor skin integrity  - Assess and monitor nutrition and hydration status  - Monitor labs   - Assess for incontinence   - Turn and reposition patient  - Assist with mobility/ambulation  - Relieve pressure over bony prominences  - Avoid friction and shearing  - Provide appropriate hygiene as needed including keeping skin clean and dry  - Evaluate need for skin moisturizer/barrier cream  - Collaborate with interdisciplinary team   - Patient/family teaching  - Consider wound care consult   Outcome: Progressing     Problem: RESPIRATORY - ADULT  Goal: Achieves optimal ventilation and oxygenation  Description: INTERVENTIONS:  - Assess for changes in respiratory status  - Assess for changes in mentation and behavior  - Position to facilitate oxygenation and minimize respiratory effort  - Oxygen administered by appropriate delivery if ordered  - Initiate smoking cessation education as indicated  - Encourage broncho-pulmonary hygiene including cough, deep breathe, Incentive Spirometry  - Assess the need for suctioning and aspirate as needed  - Assess and instruct to report SOB or any respiratory difficulty  - Respiratory Therapy support as indicated  Outcome: Progressing     Problem: DISCHARGE PLANNING  Goal: Discharge to home or other facility with appropriate resources  Description: INTERVENTIONS:  - Identify barriers to discharge w/patient and caregiver  - Arrange for needed discharge resources and transportation as appropriate  - Identify discharge learning needs (meds, wound care, etc )  - Arrange for interpretive services to assist at discharge as needed  - Refer to Case Management Department for coordinating discharge planning if the patient needs post-hospital services based on physician/advanced practitioner order or complex needs related to functional status, cognitive ability, or social support system  Outcome: Progressing     Problem: Knowledge Deficit  Goal: Patient/family/caregiver demonstrates understanding of disease process, treatment plan, medications, and discharge instructions  Description: Complete learning assessment and assess knowledge base    Interventions:  - Provide teaching at level of understanding  - Provide teaching via preferred learning methods  Outcome: Progressing

## 2022-09-09 NOTE — ASSESSMENT & PLAN NOTE
· Low suspicion for sepsis  · 2/2 increased respiratory muscle use, deconditioning  · Received fluids overnight  · Trend levels until normal  · Lactic acid trending down  ·  steroid dose decreased,  Which might have an accounting for the increase in lactic acid

## 2022-09-09 NOTE — PROGRESS NOTES
Progress note - Palliative and Supportive Care   Navya Urias 65 y o  male [id number]    Patient Active Problem List   Diagnosis    Alpha-1-antitrypsin deficiency ([place] [address] )    Gastroesophageal reflux disease    Essential hypertension    BPH (benign prostatic hyperplasia)    Liver disease    Former smoker    Chronic pulmonary embolism (HCC)    Acute on chronic respiratory failure ([facility] )    [place] (acute kidney injury) ([place] [address] )    Centrilobular emphysema (HCC)    Chronic venous insufficiency    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 (HCC)    Insomnia    Ambulatory dysfunction    Allergic rhinitis    Benign colon polyp    Cataracts, both eyes    Chronic diarrhea     Peripheral neuropathy    COPD with acute exacerbation (HCC)    Orthostatic hypotension with spine hypertension    Palliative care patient    Vitamin D deficiency disease    Avascular necrosis of hip, right (HCC)    Depression, recurrent ([place] [address] )    Anxiety    Parkinson disease ([place] [address] )    Cognitive impairment    Hypertensive urgency    Chest heaviness    H/O cardiac catheterization    Panlobular emphysema (HCC)    SIRS (systemic inflammatory response syndrome) (HCC)    Lactic acidosis     Active issues specifically addressed today include:   Very severe COPD  Parkinson's disease  Ambulatory dysfunction  Mild cognitive impairment  Depression due to medical illness  Goals of care  Palliative care patient    Plan:  1  Symptom management -    Delirium precautions: please minimize interruptions and prioritize sleep at night  No TV nor screen time at night  Shades drawn at night  During day, shades up, minimize napping, and encourage meals in chair   Melatonin HS, avoid overnight interruptions   Continue home medications for anxiety/insomnia incluing  o buspar 10mg TID  o Gabapentin 300mg TID  o remeron 30mg HS  o Trazodone 50mg HS --> discuss with primary team, will trial increase to 100mg      2  Goals -    Ongoing life prolonging cares with only limit of no prolonged intubation if in an unresponsive state   Patient expresses priority of being home, focusing on QOL  Discussed concern of increasing disease burden and frequent hospitalizations that is likely to continue over time  Vicente Gonzalez is familiar with hospice and is not interested in foregoing future hospitalizations yet  Erick Salguero to discuss his wishes/concern with his daughter along with completing advanced directive  Palliative LSW will follow-up later today     Code Status: full - Level 1   Decisional apparatus:  Patient is competent on my exam today  If competence is lost, patient's substitute decision maker would default to children by [place]  Advance Directive / Living Will / POLST:  None completed    3  Social support-   Palliative LSW following   Patient would benefit from increased home support, but is unable to pay for private caregivers  His ultimate goal is to spend as much time at home as possible   Enjoys shooting pool but has been unable to leave the house in almost 6 months   Patient is agreeable to STR upon discharge  4  Follow-up   Palliative care will return week of 9/12  Page sooner with questions or concerns  Interval history:       No events overnight  Patient reports not sleeping well at night which leads to poor energy, appetite, and mood during the day  He attributes this to the hospital environment rather than his breathing  Dyspnea is at baseline today  GOC discussion as above  He misses being home but recognizes that he is not strong enough to live independently  MEDICATIONS / ALLERGIES:     all current active meds have been reviewed    Allergies   Allergen Reactions    Chantix [Varenicline]      Caused prostate infection       OBJECTIVE:    Physical Exam  Physical Exam  HENT:      Head: Normocephalic and atraumatic     Eyes:      Conjunctiva/sclera: Conjunctivae normal    Cardiovascular:      Rate and Rhythm: Normal rate  Pulmonary:      Comments: O2 via NC  Abdominal:      General: There is no distension  Tenderness: There is no guarding  Musculoskeletal:         General: No swelling  Skin:     General: Skin is warm and dry  Neurological:      General: No focal deficit present  Mental Status: He is alert  Mental status is at baseline  Psychiatric:         Mood and Affect: Mood normal          Behavior: Behavior normal          Thought Content: Thought content normal          Judgment: Judgment normal          Lab Results:   Results from last 7 days   Lab Units 09/09/22  0552 09/08/22  0832 09/07/22  0610 09/06/22  0629 09/04/22  0426 09/03/22  1454   WBC Thousand/uL 7 65 14 75* 10 12 10 29*   < > 8 08   HEMOGLOBIN g/dL 12 2 14 3 13 2 11 9*   < > 12 8   HEMATOCRIT % 34 4* 40 7 37 7 34 8*   < > 35 8*   PLATELETS Thousands/uL 125* 199 150 124*   < > 113*   NEUTROS PCT %  --   --   --  85*  --  82*   MONOS PCT %  --   --   --  3*  --  4   MONO PCT % 4 6 3*  --   --   --     < > = values in this interval not displayed  Results from last 7 days   Lab Units 09/09/22  0552 09/08/22  0832 09/07/22  0610 09/04/22  0426 09/03/22  1454   POTASSIUM mmol/L 4 2 4 0 4 3   < > 3 2*   CHLORIDE mmol/L 99 98 101   < > 97   CO2 mmol/L 28 25 28   < > 30   BUN mg/dL 34* 32* 29*   < > 29*   CREATININE mg/dL 0 96 1 00 0 90   < > 1 13   CALCIUM mg/dL 8 8 9 4 9 3   < > 9 1   ALK PHOS U/L  --   --   --   --  61   ALT U/L  --   --   --   --  32   AST U/L  --   --   --   --  21    < > = values in this interval not displayed  Imaging Studies: reviewed pertinent studies   EKG, Pathology, and Other Studies: reviewed pertinent studies    Counseling / Coordination of Care    Total floor / unit time spent today 35 minutes  Greater than 50% of total time was spent with the patient and / or family counseling and / or coordination of care   A description of the counseling / coordination of care: time spent assessing patient, coordinating care with primary team

## 2022-09-09 NOTE — ASSESSMENT & PLAN NOTE
· Chronically on 3 L oxygen secondary to centrilobular emphysema and alpha 1 antitrypsin deficiency  · Increase oxygyen requirements, severe increase in work of breathing on minimal exertion  · Chest x-ray with no evidence of cardiopulmonary disease , CT chest severe emphysema, chronic PE changes  · Monitor oxygen requirements and keep SpO2 over 88%  · Currently on 5l, clinically deteriorating  · Continue incentive spirometry  · Treatment for COPD exacerbation as above  · Pulmonology on board-  Further investigate  For shortness of breath not explained by COPD with IVC,  Sniff test and bubble study

## 2022-09-09 NOTE — PROGRESS NOTES
[facility]  Progress Note - Ev Klein 1945, 65 y o  male MRN: [id number]  Unit/Bed#: S -01 Encounter: [id number]  Primary Care Provider: Bhupendra Walsh MD   Date and time admitted to hospital: 9/3/2022  1:55 PM    * COPD with acute exacerbation [facility])  Assessment & Plan  Patient returning to the hospital for another severe exacerbation of his COPD  Follows with [facility]  Last admission 8/18 for exacerbation of COPD and was on tapered dose of prednisone and required azithromycin  Several readmissions in the past 6 months for COPD exacerbation  Workup for this admission shows no evidence of infection  WBC normal, chest x-ray no evidence of cardiopulmonary disease, COVID/flu/RSV negative  Patient clinically deteriorating, increase SOB with work of breathing at rest   at this point looking for other causes of increased work of breathing not explained by COPD      Plan; pulmonology recomendations appreciated      -Will decrease steroid dose to 20 mg q 12 hours   -Ventilation scan,  Sniff test,  Bubble study  ·   · Atrovent/Xopenex q i d  · Daliresp 500 mg daily   · Continue performist and Pulmicort  · Respiratory protocol, airway clearance, antitussives and incentive spirometry    · Procal negative x2, No antibiotic requirements at this time  · Pulmonary rehab  · Palliative care consulted; [address] discussion given severe COPD with recurrent admissions- conversations on going      Acute on chronic respiratory failure ([facility] [address] )  Assessment & Plan    · Chronically on 3 L oxygen secondary to centrilobular emphysema and alpha 1 antitrypsin deficiency  · Increase oxygyen requirements, severe increase in work of breathing on minimal exertion  · Chest x-ray with no evidence of cardiopulmonary disease , CT chest severe emphysema, chronic PE changes  · Monitor oxygen requirements and keep SpO2 over 88%  · Currently on 5l, clinically deteriorating  · Continue incentive spirometry  · Treatment for COPD exacerbation as above  · Pulmonology on board-  Further investigate  For shortness of breath not explained by COPD with IVC,  Sniff test and bubble study      Ambulatory dysfunction  Assessment & Plan  · Multifactorial due to age, Parkinson's disease, peripheral neuropathy, chronic medical conditions   · Recently discharged from rehab, lives at home by himself, uses a walker  · Fall precautions  · PT/OT    Lactic acidosis  Assessment & Plan  · Low suspicion for sepsis  · 2/2 increased respiratory muscle use, deconditioning  · Received fluids overnight  · Trend levels until normal  · Lactic acid trending down  ·  steroid dose decreased,  Which might have an accounting for the increase in lactic acid    SIRS (systemic inflammatory response syndrome) ([facility] )  Assessment & Plan  · Patient presented to the ED with tachycardia and tachypnea  · No fever, no evidence of infection at this time  · White blood cell normal, procalcitonin normal, blood cultures pending    Parkinson disease ([facility] [address] )  Assessment & Plan  Continue sinemet    Depression, recurrent (HCC)  Assessment & Plan  Continue Buspar 10 mg tid and mirtazapine 30 mg    Chronic pulmonary embolism ([facility] )  Assessment & Plan  · Per chart review, patient completed 6-month course of Eliquis per oncology in 2018  If patient develops another acute PE then will need lifelong AC  · No need for systemic anticoagulation at this time  · No new finding of acute PE with current SOB      Essential hypertension  Assessment & Plan  Continue home medications  BP stable    Alpha-1-antitrypsin deficiency (HCC)  Assessment & Plan  · On Zemaira infusion per pulmonology  · Unable to get his infusions for the past 1 month because he was hospitalized and then had rehab             VTE Pharmacologic Prophylaxis: VTE Score: 11 High Risk (Score >/= 5) - Pharmacological DVT Prophylaxis Ordered: enoxaparin (Lovenox)   Sequential Compression Devices Ordered  Patient Centered Rounds: I performed bedside rounds with nursing staff today  Discussions with Specialists or Other Care Team Provider:  pulmonology    Education and Discussions with Family / Patient: Updated  (daughter) via phone  Current Length of Stay: 6 day(s)  Current Patient Status: Inpatient   Discharge Plan: Anticipate discharge in 24-48 hrs to rehab facility  Code Status: Level 1 - Full Code    Subjective:    patient seen this morning, sitting in his bed with severely  increased respiratory effort  Denies any cough, chest pain, headaches,  Abdominal pain or diarrhea  Patient has tib cm with minimal exertion notably going to the bathroom or even eating  Patient is not a candidate for discharge and is further evaluated by pulmonology for other causes of his significant shortness of breath at rest    Objective:     Vitals:   Temp (24hrs), Av 5 °F (36 4 °C), Min:96 6 °F (35 9 °C), Max:98 2 °F (36 8 °C)    Temp:  [96 6 °F (35 9 °C)-98 2 °F (36 8 °C)] 96 6 °F (35 9 °C)  HR:  [84-94] 86  Resp:  [16-18] 16  BP: (125-147)/(60-85) 147/85  SpO2:  [95 %-98 %] 95 %  There is no height or weight on file to calculate BMI  Input and Output Summary (last 24 hours): Intake/Output Summary (Last 24 hours) at 2022 1444  Last data filed at 2022 0721  Gross per 24 hour   Intake --   Output 1700 ml   Net -1700 ml       Physical Exam:   Physical Exam  Constitutional:       Appearance: Normal appearance  He is ill-appearing  HENT:      Head: Normocephalic and atraumatic  Nose: No congestion  Eyes:      Conjunctiva/sclera: Conjunctivae normal    Cardiovascular:      Rate and Rhythm: Regular rhythm  Tachycardia present  Pulses: Normal pulses  Pulmonary:      Effort: Respiratory distress present  Breath sounds: No wheezing or rales  Abdominal:      General: There is no distension  Palpations: There is no mass  Tenderness:  There is no abdominal tenderness  Neurological:      General: No focal deficit present  Mental Status: He is alert and oriented to person, place, and time  Cranial Nerves: No cranial nerve deficit  Motor: No weakness  Additional Data:     Labs:  Results from last 7 days   Lab Units 09/09/22  0552 09/07/22  0610 09/06/22  0629   WBC Thousand/uL 7 65   < > 10 29*   HEMOGLOBIN g/dL 12 2   < > 11 9*   HEMATOCRIT % 34 4*   < > 34 8*   PLATELETS Thousands/uL 125*   < > 124*   BANDS PCT % 1   < >  --    NEUTROS PCT %  --   --  85*   LYMPHS PCT %  --   --  10*   LYMPHO PCT % 18   < >  --    MONOS PCT %  --   --  3*   MONO PCT % 4   < >  --    EOS PCT % 0   < > 0    < > = values in this interval not displayed  Results from last 7 days   Lab Units 09/09/22  0552 09/04/22  0426 09/03/22  1454   SODIUM mmol/L 135   < > 137   POTASSIUM mmol/L 4 2   < > 3 2*   CHLORIDE mmol/L 99   < > 97   CO2 mmol/L 28   < > 30   BUN mg/dL 34*   < > 29*   CREATININE mg/dL 0 96   < > 1 13   ANION GAP mmol/L 8   < > 10   CALCIUM mg/dL 8 8   < > 9 1   ALBUMIN g/dL  --   --  4 1   TOTAL BILIRUBIN mg/dL  --   --  1 33*   ALK PHOS U/L  --   --  61   ALT U/L  --   --  32   AST U/L  --   --  21   GLUCOSE RANDOM mg/dL 165*   < > 142*    < > = values in this interval not displayed  Results from last 7 days   Lab Units 09/04/22  0426   INR  1 03             Results from last 7 days   Lab Units 09/06/22  0629 09/05/22  0932 09/04/22  1733 09/04/22  1357 09/04/22  0426 09/03/22  1658 09/03/22  1454   LACTIC ACID mmol/L 3 1* 6 0* 5 7* 5 1*  --    < > 2 8*   PROCALCITONIN ng/ml  --   --   --   --  0 13  --  0 15    < > = values in this interval not displayed  Lines/Drains:  Invasive Devices  Report    Peripheral Intravenous Line  Duration           Peripheral IV 09/07/22 Left Antecubital 2 days                      Imaging: No pertinent imaging reviewed      Recent Cultures (last 7 days):   Results from last 7 days   Lab Units 09/03/22  9757 BLOOD CULTURE  No Growth After 5 Days  No Growth After 5 Days  Last 24 Hours Medication List:   Current Facility-Administered Medications   Medication Dose Route Frequency Provider Last Rate    albuterol  2 5 mg Nebulization Q4H PRN Rigo Salguero MD      amLODIPine  10 mg Oral Daily Rigo Salguero MD      budesonide  0 5 mg Nebulization Q12H Rigo Salguero MD      busPIRone  10 mg Oral TID Rigo Salguero MD      chlorthalidone  25 mg Oral Daily Rigo Salguero MD      enoxaparin  40 mg Subcutaneous Daily Rigo Salguero MD      finasteride  5 mg Oral QAM Rigo Salguero MD      formoterol  20 mcg Nebulization Q12H Rigo Salguero MD      gabapentin  300 mg Oral TID Wendy Dey MD      ipratropium  0 5 mg Nebulization TID Rigo Salguero MD      levalbuterol  1 25 mg Nebulization TID Rigo Salguero MD      melatonin  3 mg Oral HS Cecilia Eden DO      methylPREDNISolone sodium succinate  20 mg Intravenous Q12H [address] Elvira Ortega MD      mirtazapine  30 mg Oral HS Rigo Salguero MD      ondansetron  4 mg Intravenous Q8H PRN Rigo Salguero MD      roflumilast  500 mcg Oral Daily Alex Ugalde MD      tamsulosin  0 4 mg Oral Daily Rigo Salguero MD      traZODone  100 mg Oral HS Perlita Thomas PA-C          Today, Patient Was Seen By: Rigo Salguero MD    **Please Note: This note may have been constructed using a voice recognition system  **

## 2022-09-09 NOTE — ASSESSMENT & PLAN NOTE
Patient returning to the hospital for another severe exacerbation of his COPD  Follows with [facility]  Last admission 8/18 for exacerbation of COPD and was on tapered dose of prednisone and required azithromycin  Several readmissions in the past 6 months for COPD exacerbation  Workup for this admission shows no evidence of infection  WBC normal, chest x-ray no evidence of cardiopulmonary disease, COVID/flu/RSV negative  Patient clinically deteriorating, increase SOB with work of breathing at rest   at this point looking for other causes of increased work of breathing not explained by COPD      Plan; pulmonology recomendations appreciated      -Will decrease steroid dose to 20 mg q 12 hours   -Ventilation scan,  Sniff test,  Bubble study  ·   · Atrovent/Xopenex q i d  · Daliresp 500 mg daily   · Continue performist and Pulmicort  · Respiratory protocol, airway clearance, antitussives and incentive spirometry    · Procal negative x2, No antibiotic requirements at this time  · Pulmonary rehab  · Palliative care consulted; [address] discussion given severe COPD with recurrent admissions- conversations on going

## 2022-09-09 NOTE — SOCIAL WORK
Palliative LSW saw patient at the bedside today  LSW appreciates the opportunity to provide patient/family with inpatient emotional support and guidance while patient continues to receive medical attention from the medical team      Topics discussed: Met with pt at bedside  Pt sitting up in chair upon LSW's entrance into room  Pt states his breathing feels a bit better today as he just completed a breathing treatment  His main source of discomfort currently is nausea that developed overnight and has yet to improve  Pt reports being able to eat half of a hamburger today, but not much else  He remains agreeable with [facility] dcp to [facility] once he is medically stable, but he is concerned when he leaves rehab about staying out of the hospital  Pt discussed having [facility] coordinated prior when he left rehab, but he ended up being re-hospitalized prior to the [facility] services starting  He remains hopeful to be able to build up a bit of strength as he resides alone  Pt discussed feeling as though his dtr would want him to go to a NH long-term, but he values his independence greatly and would not want to be in a facility for LTC  Pt frustrated with repeated hospitalizations, but states he wishes to live as long as he possibly can, and to "outlive the [place]" at the age she passed  LSW introduced Five Wishes Document to pt  Pt agreeable with receiving copy, but is not interested in completing at this time  LSW reviewed with pt importance of on-going discussions/conversations with family to make his children aware of his wishes  Pt willing to consider completing in the future  Areas that need follow-up: Emotional Support  Resources given: Five Wishes Document  Others present: None      I have spent 35 minutes with Patient today in which greater than 50% of this time was spent in counseling/coordination of care regarding Emotional Support         LSW will continue to follow as requested by the medical team, patient, or family

## 2022-09-10 ENCOUNTER — APPOINTMENT (OUTPATIENT)
Dept: RADIOLOGY | Facility: HOSPITAL | Age: 77
DRG: 190 | End: 2022-09-10
Payer: MEDICARE

## 2022-09-10 ENCOUNTER — APPOINTMENT (INPATIENT)
Dept: NON INVASIVE DIAGNOSTICS | Facility: HOSPITAL | Age: 77
DRG: 190 | End: 2022-09-10
Payer: MEDICARE

## 2022-09-10 LAB — LACTATE SERPL-SCNC: 2.4 MMOL/L (ref 0.5–2)

## 2022-09-10 PROCEDURE — 94640 AIRWAY INHALATION TREATMENT: CPT

## 2022-09-10 PROCEDURE — 83605 ASSAY OF LACTIC ACID: CPT | Performed by: INTERNAL MEDICINE

## 2022-09-10 PROCEDURE — 94760 N-INVAS EAR/PLS OXIMETRY 1: CPT

## 2022-09-10 PROCEDURE — 99232 SBSQ HOSP IP/OBS MODERATE 35: CPT | Performed by: INTERNAL MEDICINE

## 2022-09-10 PROCEDURE — 99232 SBSQ HOSP IP/OBS MODERATE 35: CPT | Performed by: HOSPITALIST

## 2022-09-10 PROCEDURE — 73502 X-RAY EXAM HIP UNI 2-3 VIEWS: CPT

## 2022-09-10 RX ORDER — PREDNISONE 20 MG/1
40 TABLET ORAL DAILY
Status: DISCONTINUED | OUTPATIENT
Start: 2022-09-11 | End: 2022-09-12 | Stop reason: HOSPADM

## 2022-09-10 RX ADMIN — BUDESONIDE 0.5 MG: 0.5 INHALANT ORAL at 19:52

## 2022-09-10 RX ADMIN — FAMOTIDINE 20 MG: 10 INJECTION, SOLUTION INTRAVENOUS at 21:54

## 2022-09-10 RX ADMIN — IPRATROPIUM BROMIDE 0.5 MG: 0.5 SOLUTION RESPIRATORY (INHALATION) at 07:58

## 2022-09-10 RX ADMIN — AMLODIPINE BESYLATE 10 MG: 10 TABLET ORAL at 09:28

## 2022-09-10 RX ADMIN — FINASTERIDE 5 MG: 5 TABLET, FILM COATED ORAL at 09:27

## 2022-09-10 RX ADMIN — IPRATROPIUM BROMIDE 0.5 MG: 0.5 SOLUTION RESPIRATORY (INHALATION) at 13:27

## 2022-09-10 RX ADMIN — FORMOTEROL FUMARATE DIHYDRATE 20 MCG: 20 SOLUTION RESPIRATORY (INHALATION) at 19:52

## 2022-09-10 RX ADMIN — GABAPENTIN 300 MG: 300 CAPSULE ORAL at 21:55

## 2022-09-10 RX ADMIN — METHYLPREDNISOLONE SODIUM SUCCINATE 20 MG: 40 INJECTION, POWDER, FOR SOLUTION INTRAMUSCULAR; INTRAVENOUS at 09:28

## 2022-09-10 RX ADMIN — MIRTAZAPINE 30 MG: 15 TABLET, FILM COATED ORAL at 21:54

## 2022-09-10 RX ADMIN — GABAPENTIN 300 MG: 300 CAPSULE ORAL at 16:29

## 2022-09-10 RX ADMIN — GABAPENTIN 300 MG: 300 CAPSULE ORAL at 09:27

## 2022-09-10 RX ADMIN — CHLORTHALIDONE 25 MG: 25 TABLET ORAL at 09:29

## 2022-09-10 RX ADMIN — Medication 3 MG: at 21:55

## 2022-09-10 RX ADMIN — FORMOTEROL FUMARATE DIHYDRATE 20 MCG: 20 SOLUTION RESPIRATORY (INHALATION) at 07:58

## 2022-09-10 RX ADMIN — ROFLUMILAST 500 MCG: 250 TABLET ORAL at 09:29

## 2022-09-10 RX ADMIN — LEVALBUTEROL HYDROCHLORIDE 1.25 MG: 1.25 SOLUTION, CONCENTRATE RESPIRATORY (INHALATION) at 19:52

## 2022-09-10 RX ADMIN — ENOXAPARIN SODIUM 40 MG: 40 INJECTION SUBCUTANEOUS at 09:28

## 2022-09-10 RX ADMIN — BUSPIRONE HYDROCHLORIDE 10 MG: 5 TABLET ORAL at 09:27

## 2022-09-10 RX ADMIN — IPRATROPIUM BROMIDE 0.5 MG: 0.5 SOLUTION RESPIRATORY (INHALATION) at 19:52

## 2022-09-10 RX ADMIN — BUDESONIDE 0.5 MG: 0.5 INHALANT ORAL at 07:58

## 2022-09-10 RX ADMIN — FAMOTIDINE 20 MG: 10 INJECTION, SOLUTION INTRAVENOUS at 09:28

## 2022-09-10 RX ADMIN — TRAZODONE HYDROCHLORIDE 100 MG: 100 TABLET ORAL at 21:55

## 2022-09-10 RX ADMIN — LEVALBUTEROL HYDROCHLORIDE 1.25 MG: 1.25 SOLUTION, CONCENTRATE RESPIRATORY (INHALATION) at 07:58

## 2022-09-10 RX ADMIN — BUSPIRONE HYDROCHLORIDE 10 MG: 5 TABLET ORAL at 16:29

## 2022-09-10 RX ADMIN — BUSPIRONE HYDROCHLORIDE 10 MG: 5 TABLET ORAL at 21:54

## 2022-09-10 RX ADMIN — TAMSULOSIN HYDROCHLORIDE 0.4 MG: 0.4 CAPSULE ORAL at 09:27

## 2022-09-10 RX ADMIN — LEVALBUTEROL HYDROCHLORIDE 1.25 MG: 1.25 SOLUTION, CONCENTRATE RESPIRATORY (INHALATION) at 13:27

## 2022-09-10 NOTE — ASSESSMENT & PLAN NOTE
· Low suspicion for sepsis  · 2/2 increased respiratory muscle use, deconditioning  · Received fluids overnight  · Trend levels until normal  · Lactic acid trending down, lactate the morning of September 10, 2020 to downtrending still now 2 4 down from 3 2, continue to trend  ·  steroid dose decreased,  Which might have an accounting for the increase in lactic acid

## 2022-09-10 NOTE — PROGRESS NOTES
Progress Note - Pulmonary   Manny Phani 65 y o  male MRN: [id number]  Unit/Bed#: S -01 Encounter: [id number]      Assessment/Plan:      1  Acute pulmonary insufficiency on chronic hypoxic respiratory failure  1  Baseline O2 requirements 3 L   2  Currently on 5 L NC  3  Titrate oxygen to maintain SpO2 >/=89%  4  Pulmonary toielt: IS, cough deep breathe  5  PT/OT  6  SNIFF test negative for paralysis  7  VC low at 2800ml, NIF low at -25-with significant deconditioning  2  Severe copd with acute exacerbation  1  Discontinue solumedrol and transition to prednisone taper at 40 mg daily with reduction by 10 mg every 3 days  2  Budesonide/perforomist BID, albuterol MDI PRN , xopenex/atrovent TID, Daliresp 500mg daily  3  Continue close palliative care follow up  3  Alpha 1 antitrypsin deficiency  1  Missed the last month of replacement due to hospitalizations  2  Recommend VNA to start again at discharge for therapy  4  History of chronic PE without evidence of pulmonary HTN on heart catheterization   1  Remains off AC  2  Follow Echo results      Subjective:     Brian Player was seen in bed upon entering the room  Reports his breathing is stable  Denies:  Cough, sputum production or hemoptysis  He continues with dyspnea and significant weakness  Objective:         Vitals: Blood pressure 125/72, pulse 82, temperature (!) 97 3 °F (36 3 °C), resp  rate 16, SpO2 99 %  , 5L NC, There is no height or weight on file to calculate BMI        Intake/Output Summary (Last 24 hours) at 9/10/2022 0957  Last data filed at 9/10/2022 9683  Gross per 24 hour   Intake --   Output 1025 ml   Net -1025 ml         Physical Exam  Gen: Awake, alert, oriented x 3, no acute distress  HEENT: Mucous membranes moist, no oral lesions, no thrush  NECK: no accessory muscle use, JVP not elevated  Cardiac: Regular, single S1, single S2, no murmurs, no rubs, no gallops  Lungs: clear breath sounds bilaterally  Abdomen: normoactive bowel sounds, soft nontender, nondistended, no rebound or rigidity, no guarding  Extremities: no cyanosis, no clubbing, no edema    Labs: I have personally reviewed pertinent lab results  , CBC: No results found for: WBC, HGB, HCT, MCV, PLT, ADJUSTEDWBC, MCH, MCHC, RDW, MPV, NRBC, CMP: No results found for: SODIUM, K, CL, CO2, ANIONGAP, BUN, CREATININE, GLUCOSE, CALCIUM, AST, ALT, ALKPHOS, PROT, BILITOT, EGFR  Imaging and other studies: I have personally reviewed pertinent reports   , I have personally reviewed pertinent films in PACS and CTA chest pe study 9/7/22-     IMPRESSION:     No acute pulmonary embolus      Redemonstration of chronic clot on the left      Severe emphysema        [address] [facility]

## 2022-09-10 NOTE — ASSESSMENT & PLAN NOTE
· Chronically on 3 L oxygen secondary to centrilobular emphysema and alpha 1 antitrypsin deficiency  · Pulmonology on board, appreciate recommendations  · Increase oxygyen requirements, severe increase in work of breathing on minimal exertion  · Chest x-ray with no evidence of cardiopulmonary disease , CT chest severe emphysema, chronic PE changes  · Monitor oxygen requirements and keep SpO2 over 88%  · This morning was on 8 L, clinically deteriorating  · Continue incentive spirometry  · Treatment for COPD exacerbation as above  · Sniff test negative for paralysis  · Patient demonstrating reduced vital capacity  · Plan to discharge tomorrow/Monday to short-term rehabilitation as available

## 2022-09-10 NOTE — ASSESSMENT & PLAN NOTE
Patient returning to the hospital for another severe exacerbation of his COPD  Follows with [facility]  Last admission 8/18 for exacerbation of COPD and was on tapered dose of prednisone and required azithromycin  Several readmissions in the past 6 months for COPD exacerbation  Workup for this admission shows no evidence of infection  WBC normal, chest x-ray no evidence of cardiopulmonary disease, COVID/flu/RSV negative  Patient clinically deteriorating, increase SOB with work of breathing at rest   at this point looking for other causes of increased work of breathing not explained by COPD      Plan; pulmonology recomendations appreciated  · Discontinue Solu-Medrol and transition to prednisone taper at 40 mg daily, reduce dose by 10 mg every 3 days  · Atrovent/Xopenex t i d    · Budesonide/Perforomist b i d   · Daliresp 500 mg daily   · Continue performist and Pulmicort  · Respiratory protocol, airway clearance, antitussives and incentive spirometry    · Procal negative x2, No antibiotic requirements at this time  · Pulmonary rehab  · Palliative care consulted; [address] discussion given severe COPD with recurrent admissions- conversations on going, continue to follow  · Plan to discharge tomorrow/Monday to short-term rehabilitation as available

## 2022-09-10 NOTE — PROGRESS NOTES
[facility]  Progress Note - Isrealeulalia Caal 1945, 65 y o  male MRN: [id number]  Unit/Bed#: S -01 Encounter: [id number]  Primary Care Provider: Kiel Ott MD   Date and time admitted to hospital: 9/3/2022  1:55 PM    * COPD with acute exacerbation [facility])  Assessment & Plan  Patient returning to the hospital for another severe exacerbation of his COPD  Follows with [facility] Sumeet Mustafa  Last admission 8/18 for exacerbation of COPD and was on tapered dose of prednisone and required azithromycin  Several readmissions in the past 6 months for COPD exacerbation  Workup for this admission shows no evidence of infection  WBC normal, chest x-ray no evidence of cardiopulmonary disease, COVID/flu/RSV negative  Patient clinically deteriorating, increase SOB with work of breathing at rest   at this point looking for other causes of increased work of breathing not explained by COPD      Plan; pulmonology recomendations appreciated  · Discontinue Solu-Medrol and transition to prednisone taper at 40 mg daily, reduce dose by 10 mg every 3 days  · Atrovent/Xopenex t i d    · Budesonide/Perforomist b i d   · Daliresp 500 mg daily   · Continue performist and Pulmicort  · Respiratory protocol, airway clearance, antitussives and incentive spirometry    · Procal negative x2, No antibiotic requirements at this time  · Pulmonary rehab  · Palliative care consulted; Saurabh [address] discussion given severe COPD with recurrent admissions- conversations on going, continue to follow  · Plan to discharge tomorrow/Monday to short-term rehabilitation as available      Acute on chronic respiratory failure ([facility] [address] )  Assessment & Plan    · Chronically on 3 L oxygen secondary to centrilobular emphysema and alpha 1 antitrypsin deficiency  · Pulmonology on board, appreciate recommendations  · Increase oxygyen requirements, severe increase in work of breathing on minimal exertion  · Chest x-ray with no evidence of cardiopulmonary disease , CT chest severe emphysema, chronic PE changes  · Monitor oxygen requirements and keep SpO2 over 88%  · This morning was on 8 L, clinically deteriorating  · Continue incentive spirometry  · Treatment for COPD exacerbation as above  · Sniff test negative for paralysis  · Patient demonstrating reduced vital capacity  · Plan to discharge tomorrow/Monday to short-term rehabilitation as available      Lactic acidosis  Assessment & Plan  · Low suspicion for sepsis  · 2/2 increased respiratory muscle use, deconditioning  · Received fluids overnight  · Trend levels until normal  · Lactic acid trending down, lactate the morning of September 10, 2020 to downtrending still now 2 4 down from 3 2, continue to trend  ·  steroid dose decreased,  Which might have an accounting for the increase in lactic acid    SIRS (systemic inflammatory response syndrome) ([facility] [address] )  Assessment & Plan  · Patient presented to the ED with tachycardia and tachypnea  · No fever, no evidence of infection at this time  · White blood cell normal, procalcitonin normal, blood cultures pending    Chronic pulmonary embolism ([facility] [address] )  Assessment & Plan  · Per chart review, patient completed 6-month course of Eliquis per oncology in 2018    If patient develops another acute PE then will need lifelong AC  · No need for systemic anticoagulation at this time  · No new finding of acute PE with current SOB  · YOBANY today, follow-up on results      Alpha-1-antitrypsin deficiency ([facility] [address] )  Assessment & Plan  · On Zemaira infusion per pulmonology  · Unable to get his infusions for the past 1 month because he was hospitalized and then had rehab  · Pulmonology consulted, recommending home health care to begin again at discharge for home therapy       Parkinson disease [facility])  Assessment & Plan  Continue sinemet    Depression, recurrent (HCC)  Assessment & Plan  Continue Buspar 10 mg tid and mirtazapine 30 mg    Ambulatory dysfunction  Assessment & Plan  · Multifactorial due to age, Parkinson's disease, peripheral neuropathy, chronic medical conditions   · Recently discharged from rehab, lives at home by himself, uses a walker  · Fall precautions  · PT/OT    Essential hypertension  Assessment & Plan  Continue home medications  BP stable        VTE Pharmacologic Prophylaxis: VTE Score: 11 High Risk (Score >/= 5) - Pharmacological DVT Prophylaxis Ordered: enoxaparin (Lovenox)  Sequential Compression Devices Ordered  Patient Centered Rounds: I performed bedside rounds with nursing staff today  Discussions with Specialists or Other Care Team Provider:  Pulmonology, attending physician    Education and Discussions with Family / Patient: Will update family today via phone  Current Length of Stay: 7 day(s)  Current Patient Status: Inpatient   Discharge Plan: TBD    Code Status: Level 1 - Full Code    Subjective:   Mandeep Francois is a 76-year-old male with a past medical history of COPD, alpha-1 antitrypsin disorder, chronic respiratory failure on 3 L of oxygen at home, Parkinson's disease, chronic pulmonary embolism, and a history of multiple recent COPD admissions who presented on  with hypoxia and a fall secondary to COPD exacerbation  This morning patient reports his breathing is still manageable at rest, however he becomes she is significantly short of breath when he gets up to use the bathroom  He reports he is able to eat, however had some vomiting yesterday following eating  He tolerated breakfast this morning and denies nausea  Patient denies fever, chills, abdominal pain, chest pain, dysuria, diarrhea, constipation  Objective:     Vitals:   Temp (24hrs), Av 9 °F (36 6 °C), Min:97 3 °F (36 3 °C), Max:98 6 °F (37 °C)    Temp:  [97 3 °F (36 3 °C)-98 6 °F (37 °C)] 97 3 °F (36 3 °C)  HR:  [82] 82  Resp:  [16-18] 16  BP: (125-140)/(72-78) 125/72  SpO2:  [95 %-99 %] 99 %  There is no height or weight on file to calculate BMI       Input and Output Summary (last 24 hours): Intake/Output Summary (Last 24 hours) at 9/10/2022 1253  Last data filed at 9/10/2022 1145  Gross per 24 hour   Intake --   Output 1035 ml   Net -1035 ml       Physical Exam:   Physical Exam  Vitals and nursing note reviewed  Constitutional:       Appearance: He is well-developed  HENT:      Head: Normocephalic and atraumatic  Eyes:      Conjunctiva/sclera: Conjunctivae normal    Cardiovascular:      Rate and Rhythm: Normal rate and regular rhythm  Heart sounds: No murmur heard  Pulmonary:      Effort: Pulmonary effort is normal  No respiratory distress  Breath sounds: Normal breath sounds  Comments: Reduced lung sounds bilaterally  Bilateral crackles at the bases  Abdominal:      General: There is distension (Patient reports this is baseline)  Palpations: Abdomen is soft  Tenderness: There is no abdominal tenderness  Comments: Reduced bowel sounds   Musculoskeletal:      Cervical back: Neck supple  Right lower leg: No edema  Left lower leg: No edema  Skin:     General: Skin is warm and dry  Neurological:      Mental Status: He is alert  Additional Data:     Labs:  Results from last 7 days   Lab Units 09/09/22  0552 09/07/22  0610 09/06/22  0629   WBC Thousand/uL 7 65   < > 10 29*   HEMOGLOBIN g/dL 12 2   < > 11 9*   HEMATOCRIT % 34 4*   < > 34 8*   PLATELETS Thousands/uL 125*   < > 124*   BANDS PCT % 1   < >  --    NEUTROS PCT %  --   --  85*   LYMPHS PCT %  --   --  10*   LYMPHO PCT % 18   < >  --    MONOS PCT %  --   --  3*   MONO PCT % 4   < >  --    EOS PCT % 0   < > 0    < > = values in this interval not displayed       Results from last 7 days   Lab Units 09/09/22  0552 09/04/22  0426 09/03/22  1454   SODIUM mmol/L 135   < > 137   POTASSIUM mmol/L 4 2   < > 3 2*   CHLORIDE mmol/L 99   < > 97   CO2 mmol/L 28   < > 30   BUN mg/dL 34*   < > 29*   CREATININE mg/dL 0 96   < > 1 13   ANION GAP mmol/L 8   < > 10   CALCIUM mg/dL 8 8   < > 9 1 ALBUMIN g/dL  --   --  4 1   TOTAL BILIRUBIN mg/dL  --   --  1 33*   ALK PHOS U/L  --   --  61   ALT U/L  --   --  32   AST U/L  --   --  21   GLUCOSE RANDOM mg/dL 165*   < > 142*    < > = values in this interval not displayed  Results from last 7 days   Lab Units 09/04/22  0426   INR  1 03             Results from last 7 days   Lab Units 09/10/22  0934 09/09/22  1423 09/06/22  0629 09/05/22  0932 09/04/22  1357 09/04/22  0426 09/03/22  1658 09/03/22  1454   LACTIC ACID mmol/L 2 4* 3 2* 3 1* 6 0*   < >  --    < > 2 8*   PROCALCITONIN ng/ml  --   --   --   --   --  0 13  --  0 15    < > = values in this interval not displayed  Lines/Drains:  Invasive Devices  Report    Peripheral Intravenous Line  Duration           Peripheral IV 09/07/22 Left Antecubital 2 days                      Imaging: Reviewed radiology reports from this admission including: chest xray and chest CT scan    Recent Cultures (last 7 days):   Results from last 7 days   Lab Units 09/03/22  1454   BLOOD CULTURE  No Growth After 5 Days  No Growth After 5 Days         Last 24 Hours Medication List:   Current Facility-Administered Medications   Medication Dose Route Frequency Provider Last Rate    albuterol  2 5 mg Nebulization Q4H PRN Gail Ayala MD      amLODIPine  10 mg Oral Daily Gail Ayala MD      budesonide  0 5 mg Nebulization Q12H Gail Ayala MD      busPIRone  10 mg Oral TID Gail Ayala MD      calcium carbonate  1,000 mg Oral TID PRN Amanda Al MD      chlorthalidone  25 mg Oral Daily Gail Ayala MD      enoxaparin  40 mg Subcutaneous Daily aGil Ayala MD      famotidine  20 mg Intravenous Q12H Leena Anne MD      finasteride  5 mg Oral QAM Caity Malone MD      formoterol  20 mcg Nebulization Q12H Gail Ayala MD      gabapentin  300 mg Oral TID MD Whitney Islas ipratropium  0 5 mg Nebulization TID Kary Rodriguez MD      levalbuterol  1 25 mg Nebulization TID Kary Rodriguez MD      melatonin  3 mg Oral HS Cecilia Yadav DO      mirtazapine  30 mg Oral HS Caity Robles MD      ondansetron  4 mg Intravenous Q4H PRN Bisi Lugo MD      [START ON 9/11/2022] predniSONE  40 mg Oral Daily TOPHER Hutchins      roflumilast  500 mcg Oral Daily Seth Worrell MD      tamsulosin  0 4 mg Oral Daily Kary Rodriguez MD      traZODone  100 mg Oral HS Paula Dodd PA-C          Today, Patient Was Seen By: López North MD    **Please Note: This note may have been constructed using a voice recognition system  **

## 2022-09-10 NOTE — ASSESSMENT & PLAN NOTE
· Per chart review, patient completed 6-month course of Eliquis per oncology in 2018    If patient develops another acute PE then will need lifelong AC  · No need for systemic anticoagulation at this time  · No new finding of acute PE with current SOB  · YOBANY today, follow-up on results

## 2022-09-10 NOTE — ASSESSMENT & PLAN NOTE
· On Zemaira infusion per pulmonology  · Unable to get his infusions for the past 1 month because he was hospitalized and then had rehab  · Pulmonology consulted, recommending home health care to begin again at discharge for home therapy

## 2022-09-11 ENCOUNTER — APPOINTMENT (INPATIENT)
Dept: NON INVASIVE DIAGNOSTICS | Facility: HOSPITAL | Age: 77
DRG: 190 | End: 2022-09-11
Payer: MEDICARE

## 2022-09-11 LAB
ANION GAP SERPL CALCULATED.3IONS-SCNC: 6 MMOL/L (ref 4–13)
BASOPHILS # BLD MANUAL: 0 THOUSAND/UL (ref 0–0.1)
BASOPHILS NFR MAR MANUAL: 0 % (ref 0–1)
BUN SERPL-MCNC: 44 MG/DL (ref 5–25)
CALCIUM SERPL-MCNC: 8.9 MG/DL (ref 8.4–10.2)
CHLORIDE SERPL-SCNC: 97 MMOL/L (ref 96–108)
CO2 SERPL-SCNC: 32 MMOL/L (ref 21–32)
CREAT SERPL-MCNC: 0.94 MG/DL (ref 0.6–1.3)
EOSINOPHIL # BLD MANUAL: 0 THOUSAND/UL (ref 0–0.4)
EOSINOPHIL NFR BLD MANUAL: 0 % (ref 0–6)
ERYTHROCYTE [DISTWIDTH] IN BLOOD BY AUTOMATED COUNT: 14.8 % (ref 11.6–15.1)
GFR SERPL CREATININE-BSD FRML MDRD: 77 ML/MIN/1.73SQ M
GLUCOSE SERPL-MCNC: 123 MG/DL (ref 65–140)
HCT VFR BLD AUTO: 34.9 % (ref 36.5–49.3)
HGB BLD-MCNC: 12.3 G/DL (ref 12–17)
LYMPHOCYTES # BLD AUTO: 3.43 THOUSAND/UL (ref 0.6–4.47)
LYMPHOCYTES # BLD AUTO: 36 % (ref 14–44)
MCH RBC QN AUTO: 34.1 PG (ref 26.8–34.3)
MCHC RBC AUTO-ENTMCNC: 35.2 G/DL (ref 31.4–37.4)
MCV RBC AUTO: 97 FL (ref 82–98)
METAMYELOCYTES NFR BLD MANUAL: 2 % (ref 0–1)
MONOCYTES # BLD AUTO: 0.38 THOUSAND/UL (ref 0–1.22)
MONOCYTES NFR BLD: 4 % (ref 4–12)
NEUTROPHILS # BLD MANUAL: 5.52 THOUSAND/UL (ref 1.85–7.62)
NEUTS SEG NFR BLD AUTO: 58 % (ref 43–75)
PLATELET # BLD AUTO: 112 THOUSANDS/UL (ref 149–390)
PLATELET BLD QL SMEAR: ABNORMAL
PMV BLD AUTO: 10 FL (ref 8.9–12.7)
POLYCHROMASIA BLD QL SMEAR: PRESENT
POTASSIUM SERPL-SCNC: 3.8 MMOL/L (ref 3.5–5.3)
RBC # BLD AUTO: 3.61 MILLION/UL (ref 3.88–5.62)
RBC MORPH BLD: PRESENT
SODIUM SERPL-SCNC: 135 MMOL/L (ref 135–147)
WBC # BLD AUTO: 9.52 THOUSAND/UL (ref 4.31–10.16)

## 2022-09-11 PROCEDURE — 99232 SBSQ HOSP IP/OBS MODERATE 35: CPT | Performed by: HOSPITALIST

## 2022-09-11 PROCEDURE — 94760 N-INVAS EAR/PLS OXIMETRY 1: CPT

## 2022-09-11 PROCEDURE — 80048 BASIC METABOLIC PNL TOTAL CA: CPT

## 2022-09-11 PROCEDURE — 99232 SBSQ HOSP IP/OBS MODERATE 35: CPT | Performed by: INTERNAL MEDICINE

## 2022-09-11 PROCEDURE — 85027 COMPLETE CBC AUTOMATED: CPT

## 2022-09-11 PROCEDURE — 85007 BL SMEAR W/DIFF WBC COUNT: CPT

## 2022-09-11 PROCEDURE — 94640 AIRWAY INHALATION TREATMENT: CPT

## 2022-09-11 RX ORDER — LIDOCAINE 50 MG/G
1 PATCH TOPICAL DAILY
Status: DISCONTINUED | OUTPATIENT
Start: 2022-09-11 | End: 2022-09-12 | Stop reason: HOSPADM

## 2022-09-11 RX ADMIN — BUSPIRONE HYDROCHLORIDE 10 MG: 5 TABLET ORAL at 22:33

## 2022-09-11 RX ADMIN — PREDNISONE 40 MG: 20 TABLET ORAL at 08:28

## 2022-09-11 RX ADMIN — IPRATROPIUM BROMIDE 0.5 MG: 0.5 SOLUTION RESPIRATORY (INHALATION) at 13:19

## 2022-09-11 RX ADMIN — GABAPENTIN 300 MG: 300 CAPSULE ORAL at 22:32

## 2022-09-11 RX ADMIN — IPRATROPIUM BROMIDE 0.5 MG: 0.5 SOLUTION RESPIRATORY (INHALATION) at 20:36

## 2022-09-11 RX ADMIN — FAMOTIDINE 20 MG: 10 INJECTION, SOLUTION INTRAVENOUS at 08:28

## 2022-09-11 RX ADMIN — ENOXAPARIN SODIUM 40 MG: 40 INJECTION SUBCUTANEOUS at 08:28

## 2022-09-11 RX ADMIN — FAMOTIDINE 20 MG: 10 INJECTION, SOLUTION INTRAVENOUS at 22:33

## 2022-09-11 RX ADMIN — CHLORTHALIDONE 25 MG: 25 TABLET ORAL at 08:29

## 2022-09-11 RX ADMIN — GABAPENTIN 300 MG: 300 CAPSULE ORAL at 08:28

## 2022-09-11 RX ADMIN — DICLOFENAC SODIUM TOPICAL GEL, 1%, 2 G: 10 GEL TOPICAL at 12:23

## 2022-09-11 RX ADMIN — DICLOFENAC SODIUM TOPICAL GEL, 1%, 2 G: 10 GEL TOPICAL at 17:07

## 2022-09-11 RX ADMIN — ROFLUMILAST 500 MCG: 250 TABLET ORAL at 12:23

## 2022-09-11 RX ADMIN — GABAPENTIN 300 MG: 300 CAPSULE ORAL at 17:07

## 2022-09-11 RX ADMIN — LEVALBUTEROL HYDROCHLORIDE 1.25 MG: 1.25 SOLUTION, CONCENTRATE RESPIRATORY (INHALATION) at 07:09

## 2022-09-11 RX ADMIN — AMLODIPINE BESYLATE 10 MG: 10 TABLET ORAL at 08:28

## 2022-09-11 RX ADMIN — BUSPIRONE HYDROCHLORIDE 10 MG: 5 TABLET ORAL at 17:07

## 2022-09-11 RX ADMIN — IPRATROPIUM BROMIDE 0.5 MG: 0.5 SOLUTION RESPIRATORY (INHALATION) at 07:09

## 2022-09-11 RX ADMIN — LEVALBUTEROL HYDROCHLORIDE 1.25 MG: 1.25 SOLUTION, CONCENTRATE RESPIRATORY (INHALATION) at 13:19

## 2022-09-11 RX ADMIN — FINASTERIDE 5 MG: 5 TABLET, FILM COATED ORAL at 08:28

## 2022-09-11 RX ADMIN — DICLOFENAC SODIUM TOPICAL GEL, 1%, 2 G: 10 GEL TOPICAL at 22:33

## 2022-09-11 RX ADMIN — TRAZODONE HYDROCHLORIDE 100 MG: 100 TABLET ORAL at 22:33

## 2022-09-11 RX ADMIN — FORMOTEROL FUMARATE DIHYDRATE 20 MCG: 20 SOLUTION RESPIRATORY (INHALATION) at 20:36

## 2022-09-11 RX ADMIN — BUDESONIDE 0.5 MG: 0.5 INHALANT ORAL at 07:09

## 2022-09-11 RX ADMIN — MIRTAZAPINE 30 MG: 15 TABLET, FILM COATED ORAL at 22:32

## 2022-09-11 RX ADMIN — FORMOTEROL FUMARATE DIHYDRATE 20 MCG: 20 SOLUTION RESPIRATORY (INHALATION) at 07:09

## 2022-09-11 RX ADMIN — BUSPIRONE HYDROCHLORIDE 10 MG: 5 TABLET ORAL at 08:28

## 2022-09-11 RX ADMIN — BUDESONIDE 0.5 MG: 0.5 INHALANT ORAL at 20:36

## 2022-09-11 RX ADMIN — LIDOCAINE 5% 1 PATCH: 700 PATCH TOPICAL at 12:23

## 2022-09-11 RX ADMIN — Medication 3 MG: at 22:32

## 2022-09-11 RX ADMIN — LEVALBUTEROL HYDROCHLORIDE 1.25 MG: 1.25 SOLUTION, CONCENTRATE RESPIRATORY (INHALATION) at 20:36

## 2022-09-11 RX ADMIN — TAMSULOSIN HYDROCHLORIDE 0.4 MG: 0.4 CAPSULE ORAL at 08:28

## 2022-09-11 NOTE — PROGRESS NOTES
[facility]  Progress Note - Austen Dorantes 1945, 65 y o  male MRN: [id number]  Unit/Bed#: S -01 Encounter: [id number]  Primary Care Provider: Mayco Garvin MD   Date and time admitted to hospital: 9/3/2022  1:55 PM    * COPD with acute exacerbation [facility])  Assessment & Plan  Patient returning to the hospital for another severe exacerbation of his COPD  Follows with [facility] Rosas  Last admission 8/18 for exacerbation of COPD and was on tapered dose of prednisone and required azithromycin  Several readmissions in the past 6 months for COPD exacerbation  Workup for this admission shows no evidence of infection  WBC normal, chest x-ray no evidence of cardiopulmonary disease, COVID/flu/RSV negative  Patient clinically deteriorating, increase SOB with work of breathing at rest   at this point looking for other causes of increased work of breathing not explained by COPD   cardiac echo pending      Plan; pulmonology recomendations appreciated  · Discontinue Solu-Medrol and transition to prednisone taper at 40 mg daily, reduce dose by 10 mg every 3 days  · Atrovent/Xopenex t i d    · Budesonide/Perforomist b i d   · Daliresp 500 mg daily   · Continue performist and Pulmicort  · Respiratory protocol, airway clearance, antitussives and incentive spirometry    · Procal negative x2, No antibiotic requirements at this time  · Pulmonary rehab  · Palliative care consulted; [address]aur[address]h [address] discussion given severe COPD with recurrent admissions- conversations on going, continue to follow  · Plan to discharge tomorrow/Monday to short-term rehabilitation as available- follow-up on echo before discharge      Acute on chronic respiratory failure ([facility] [address] )  Assessment & Plan    · Chronically on 3 L oxygen secondary to centrilobular emphysema and alpha 1 antitrypsin deficiency  · Pulmonology on board, appreciate recommendations  · Increase oxygyen requirements, severe increase in work of breathing on minimal exertion  · Chest x-ray with no evidence of cardiopulmonary disease , CT chest severe emphysema, chronic PE changes  · Monitor oxygen requirements and keep SpO2 over 88%  · This morning was on 3 L, clinically deteriorating  · Continue incentive spirometry  · Treatment for COPD exacerbation as above  · Sniff test negative for paralysis  · Patient demonstrating reduced vital capacity  · Plan to discharge tomorrow/Monday to short-term rehabilitation as available      Ambulatory dysfunction  Assessment & Plan  · Multifactorial due to age, Parkinson's disease, peripheral neuropathy, chronic medical conditions   · Recently discharged from rehab, lives at home by himself, uses a walker  · Fall precautions  · PT/OT  · 9/11 patient had a fall yesterday while trying to go to the bathroom and in the presence of a nurse  He reports transient parent of blackout after which he felt the ground hitting his back to the floor, no head injury  ·  x-ray of the hip done yesterday was unremarkable  ·  patient reports low back pain which is significantly tender on palpation    Parkinson disease ([facility] [address] )  Assessment & Plan  Continue sinemet    Depression, recurrent (HCC)  Assessment & Plan  Continue Buspar 10 mg tid and mirtazapine 30 mg    Syncope  Assessment & Plan   Patient had a fall yesterday, he describes getting up from the chair with assistance from the nose and trying to get to the bedroom  He reported period of transient blood count found himself on the floor and hit his back   orthostatic blood pressures were negative for orthostatic hypotension   most likely vasovagal syncope  Plan    fall precautions   follow-up with cardiac echo to rule out any cardiac etiologies    Chronic pulmonary embolism [facility])  Assessment & Plan  · Per chart review, patient completed 6-month course of Eliquis per oncology in 2018    If patient develops another acute PE then will need lifelong AC  · No need for systemic anticoagulation at this time  · No new finding of acute PE with current SOB  · YOBANY today, follow-up on results      Essential hypertension  Assessment & Plan  Continue home medications  BP stable    Alpha-1-antitrypsin deficiency (HCC)  Assessment & Plan  · On Zemaira infusion per pulmonology  · Unable to get his infusions for the past 1 month because he was hospitalized and then had rehab  · Pulmonology consulted, recommending home health care to begin again at discharge for home therapy           VTE Pharmacologic Prophylaxis: VTE Score: 11 High Risk (Score >/= 5) - Pharmacological DVT Prophylaxis Ordered: enoxaparin (Lovenox)  Sequential Compression Devices Ordered  Patient Centered Rounds: I performed bedside rounds with nursing staff today  Discussions with Specialists or Other Care Team Provider:     Education and Discussions with Family / Patient: Attempted to update  (daughter) via phone  Unable to contact  Current Length of Stay: 8 day(s)  Current Patient Status: Inpatient   Discharge Plan: Anticipate discharge in 24-48 hrs to home with home services  Code Status: Level 1 - Full Code    Subjective:    patient seen this morning,  Reports low back pain following a fall yesterday night,  No evidence of fracture and patient is capable of ambulating  I had a brief period of blackout following the fall, but denied any headaches, chest pain, palpitations  He is still very muchshort of breath on exertion   with increased work of breathing, however no increased oxygen requirement at the  momente   Objective:     Vitals:   Temp (24hrs), Av 2 °F (36 8 °C), Min:97 7 °F (36 5 °C), Max:98 5 °F (36 9 °C)    Temp:  [97 7 °F (36 5 °C)-98 5 °F (36 9 °C)] 98 5 °F (36 9 °C)  HR:  [68-98] 75  Resp:  [16-20] 18  BP: (117-176)/() 142/69  SpO2:  [95 %-97 %] 97 %  There is no height or weight on file to calculate BMI  Input and Output Summary (last 24 hours):      Intake/Output Summary (Last 24 hours) at 2022 1506  Last data filed at 9/11/2022 1229  Gross per 24 hour   Intake --   Output 925 ml   Net -925 ml       Physical Exam:   Physical Exam   Physical Exam  Constitutional:       Appearance: He is ill-appearing and diaphoretic  HENT:      Head: Normocephalic and atraumatic  Eyes:      Conjunctiva/sclera: Conjunctivae normal    Cardiovascular:      Rate and Rhythm: Normal rate and regular rhythm  Pulses: Normal pulses  Heart sounds: No murmur heard  Pulmonary:      Breath sounds: No wheezing or rales  Chest:      Chest wall: No tenderness  Abdominal:      General: Bowel sounds are normal  There is no distension  Palpations: Abdomen is soft  Tenderness: There is no abdominal tenderness  Musculoskeletal:      Lumbar back: Tenderness (left paraspinal tenderness from fall) present  Skin:     General: Skin is warm  Coloration: Skin is not jaundiced  Findings: No bruising or lesion  Neurological:      General: No focal deficit present  Mental Status: He is alert and oriented to person, place, and time  Additional Data:     Labs:  Results from last 7 days   Lab Units 09/11/22  0443 09/09/22  0552 09/07/22  0610 09/06/22  0629   WBC Thousand/uL 9 52 7 65   < > 10 29*   HEMOGLOBIN g/dL 12 3 12 2   < > 11 9*   HEMATOCRIT % 34 9* 34 4*   < > 34 8*   PLATELETS Thousands/uL 112* 125*   < > 124*   BANDS PCT %  --  1   < >  --    NEUTROS PCT %  --   --   --  85*   LYMPHS PCT %  --   --   --  10*   LYMPHO PCT % 36 18   < >  --    MONOS PCT %  --   --   --  3*   MONO PCT % 4 4   < >  --    EOS PCT % 0 0   < > 0    < > = values in this interval not displayed       Results from last 7 days   Lab Units 09/11/22  0443   SODIUM mmol/L 135   POTASSIUM mmol/L 3 8   CHLORIDE mmol/L 97   CO2 mmol/L 32   BUN mg/dL 44*   CREATININE mg/dL 0 94   ANION GAP mmol/L 6   CALCIUM mg/dL 8 9   GLUCOSE RANDOM mg/dL 123                 Results from last 7 days   Lab Units 09/10/22  0934 09/09/22  1423 09/06/22  0629 09/05/22  0932   LACTIC ACID mmol/L 2 4* 3 2* 3 1* 6 0*       Lines/Drains:  Invasive Devices  Report    Peripheral Intravenous Line  Duration           Peripheral IV 09/11/22 Dorsal (posterior); Right Forearm <1 day                      Imaging: Personally reviewed the following imaging: xray(s)    Recent Cultures (last 7 days):         Last 24 Hours Medication List:   Current Facility-Administered Medications   Medication Dose Route Frequency Provider Last Rate    albuterol  2 5 mg Nebulization Q4H PRN Jory Blevins MD      amLODIPine  10 mg Oral Daily Jory Blevins MD      budesonide  0 5 mg Nebulization Q12H Jory Blevins MD      busPIRone  10 mg Oral TID Jory Blevins MD      calcium carbonate  1,000 mg Oral TID PRN Compa Leone MD      chlorthalidone  25 mg Oral Daily Jory Blevins MD      Diclofenac Sodium  2 g Topical 4x Daily Jory Blevins MD      enoxaparin  40 mg Subcutaneous Daily Jory Blevins MD      famotidine  20 mg Intravenous Q12H Tere Servin MD      finasteride  5 mg Oral QAM Jory Blevins MD      formoterol  20 mcg Nebulization Q12H Jory Blevins MD      gabapentin  300 mg Oral TID Linda Paz MD      ipratropium  0 5 mg Nebulization TID Jory Blevins MD      levalbuterol  1 25 mg Nebulization TID Jory Blevins MD      lidocaine  1 patch Topical Daily Jory Blevins MD      melatonin  3 mg Oral HS Cecilia Yadav DO      mirtazapine  30 mg Oral HS Jory Blevins MD      ondansetron  4 mg Intravenous Q4H PRN Compa Leone MD      predniSONE  40 mg Oral Daily TOPHER Merlos      roflumilast  500 mcg Oral Daily Radha Tate MD      tamsulosin  0 4 mg Oral Daily Jory Blevins MD      traZODone  100 mg Oral HS Perlita CALIXTO Manisha Layton PA-C          Today, Patient Was Seen By: Violet Petit MD    **Please Note: This note may have been constructed using a voice recognition system  **

## 2022-09-11 NOTE — ASSESSMENT & PLAN NOTE
Patient had a fall yesterday, he describes getting up from the chair with assistance from the nose and trying to get to the bedroom    He reported period of transient blood count found himself on the floor and hit his back   orthostatic blood pressures were negative for orthostatic hypotension   most likely vasovagal syncope  Plan    fall precautions   follow-up with cardiac echo to rule out any cardiac etiologies

## 2022-09-11 NOTE — ASSESSMENT & PLAN NOTE
· Chronically on 3 L oxygen secondary to centrilobular emphysema and alpha 1 antitrypsin deficiency  · Pulmonology on board, appreciate recommendations  · Increase oxygyen requirements, severe increase in work of breathing on minimal exertion  · Chest x-ray with no evidence of cardiopulmonary disease , CT chest severe emphysema, chronic PE changes  · Monitor oxygen requirements and keep SpO2 over 88%  · This morning was on 3 L, clinically deteriorating  · Continue incentive spirometry  · Treatment for COPD exacerbation as above  · Sniff test negative for paralysis  · Patient demonstrating reduced vital capacity  · Plan to discharge tomorrow/Monday to short-term rehabilitation as available

## 2022-09-11 NOTE — ASSESSMENT & PLAN NOTE
Patient returning to the hospital for another severe exacerbation of his COPD  Follows with [facility] Megan Jimenez  Last admission 8/18 for exacerbation of COPD and was on tapered dose of prednisone and required azithromycin  Several readmissions in the past 6 months for COPD exacerbation  Workup for this admission shows no evidence of infection  WBC normal, chest x-ray no evidence of cardiopulmonary disease, COVID/flu/RSV negative  Patient clinically deteriorating, increase SOB with work of breathing at rest   at this point looking for other causes of increased work of breathing not explained by COPD   cardiac echo pending      Plan; pulmonology recomendations appreciated  · Discontinue Solu-Medrol and transition to prednisone taper at 40 mg daily, reduce dose by 10 mg every 3 days  · Atrovent/Xopenex t i d    · Budesonide/Perforomist b i d   · Daliresp 500 mg daily   · Continue performist and Pulmicort  · Respiratory protocol, airway clearance, antitussives and incentive spirometry    · Procal negative x2, No antibiotic requirements at this time  · Pulmonary rehab  · Palliative care consulted; [address] discussion given severe COPD with recurrent admissions- conversations on going, continue to follow  · Plan to discharge tomorrow/Monday to short-term rehabilitation as available- follow-up on echo before discharge

## 2022-09-11 NOTE — PROGRESS NOTES
Progress Note - Pulmonary   Thai García 65 y o  male MRN: [id number]  Unit/Bed#: S -01 Encounter: [id number]    Assessment:  1  Acute on chronic hypoxic respiratory failure due to severe copd with acute exacerbation  2  Alpha-1-antitrypsin deficiency  Plan:  Exam is clear, able to continue prednisone taper as ordered  Resume Prolastin at discharge  Continue other pulmonary medications as ordered  Chief Complaint:   "I feel weak "    Subjective:   No pulmonary complaints  Denies dyspnea and his cough remains dry  Objective:     Vitals: Blood pressure 117/74, pulse 98, temperature 97 7 °F (36 5 °C), temperature source Oral, resp  rate 20, SpO2 95 %  ,There is no height or weight on file to calculate BMI  Intake/Output Summary (Last 24 hours) at 9/11/2022 1245  Last data filed at 9/11/2022 1229  Gross per 24 hour   Intake --   Output 925 ml   Net -925 ml       Invasive Devices  Report    Peripheral Intravenous Line  Duration           Peripheral IV 09/11/22 Dorsal (posterior); Right Forearm <1 day              Physical Exam:   General:  No acute distress  Alert and oriented x 3  HEENT:  PERRL   MMM  Chest:  Clear but diminished bilaterally  Cardiovascular:  S1 + S2, RRR, no M/R/G  Abdomen:  Soft, nontender, nondistended, no palpable masses or organomegaly  Extremities:  No significant edema  Neuro:  No focal deficits, grossly intact  Psych:  Normal mood and affect  Labs: I have personally reviewed pertinent lab results    Imaging and other studies: I have personally reviewed pertinent films in PACS

## 2022-09-11 NOTE — QUICK NOTE
Was notified by patient's nurse that Mr Lucila Elaine had a witnessed fall while being assisted to use the bathroom from his chair  Patient became dizzy and fell over on his right side  No loss of consciousness, no head strike  Nurse reported that blood pressure was taken after episode he had an orthostatics reading of 176/100, 176/100, 118/62  Upon my arrival patient was sitting in chair  Alert and oriented had tenderness over the right posterior pelvic girdle, in the region of the SI joint  X-ray ordered, pending  Repeat orthostatics at 6:07 p m  136/74, 137/73, 129/71

## 2022-09-11 NOTE — ASSESSMENT & PLAN NOTE
· Multifactorial due to age, Parkinson's disease, peripheral neuropathy, chronic medical conditions   · Recently discharged from rehab, lives at home by himself, uses a walker  · Fall precautions  · PT/OT  · 9/11 patient had a fall yesterday while trying to go to the bathroom and in the presence of a nurse    He reports transient parent of blackout after which he felt the ground hitting his back to the floor, no head injury  ·  x-ray of the hip done yesterday was unremarkable  ·  patient reports low back pain which is significantly tender on palpation

## 2022-09-12 ENCOUNTER — APPOINTMENT (INPATIENT)
Dept: NON INVASIVE DIAGNOSTICS | Facility: HOSPITAL | Age: 77
DRG: 190 | End: 2022-09-12
Payer: MEDICARE

## 2022-09-12 VITALS
DIASTOLIC BLOOD PRESSURE: 74 MMHG | HEIGHT: 77 IN | SYSTOLIC BLOOD PRESSURE: 121 MMHG | WEIGHT: 190 LBS | OXYGEN SATURATION: 96 % | TEMPERATURE: 97.7 F | BODY MASS INDEX: 22.43 KG/M2 | RESPIRATION RATE: 16 BRPM | HEART RATE: 104 BPM

## 2022-09-12 LAB
APICAL FOUR CHAMBER EJECTION FRACTION: 71 %
AV PEAK GRADIENT: 2 MMHG
E WAVE DECELERATION TIME: 157 MS
MV E'TISSUE VEL-SEP: 5 CM/S
MV PEAK A VEL: 0.76 M/S
MV PEAK E VEL: 50 CM/S
MV STENOSIS PRESSURE HALF TIME: 45 MS
MV VALVE AREA P 1/2 METHOD: 4.89
PA SYSTOLIC PRESSURE: 43 MMHG
RA PRESSURE ESTIMATED: 8 MMHG
RIGHT VENTRICLE ID DIMENSION: 4.1 CM
RV PSP: 43 MMHG
SL CV LV EF: 70
TR MAX PG: 35 MMHG
TR PEAK VELOCITY: 2.9 M/S
TRICUSPID VALVE PEAK REGURGITATION VELOCITY: 2.94 M/S

## 2022-09-12 PROCEDURE — 93308 TTE F-UP OR LMTD: CPT

## 2022-09-12 PROCEDURE — 93321 DOPPLER ECHO F-UP/LMTD STD: CPT | Performed by: INTERNAL MEDICINE

## 2022-09-12 PROCEDURE — 99239 HOSP IP/OBS DSCHRG MGMT >30: CPT | Performed by: HOSPITALIST

## 2022-09-12 PROCEDURE — 99232 SBSQ HOSP IP/OBS MODERATE 35: CPT | Performed by: INTERNAL MEDICINE

## 2022-09-12 PROCEDURE — 93325 DOPPLER ECHO COLOR FLOW MAPG: CPT

## 2022-09-12 PROCEDURE — 93321 DOPPLER ECHO F-UP/LMTD STD: CPT

## 2022-09-12 PROCEDURE — 93308 TTE F-UP OR LMTD: CPT | Performed by: INTERNAL MEDICINE

## 2022-09-12 PROCEDURE — 94640 AIRWAY INHALATION TREATMENT: CPT

## 2022-09-12 PROCEDURE — 94760 N-INVAS EAR/PLS OXIMETRY 1: CPT

## 2022-09-12 PROCEDURE — 93325 DOPPLER ECHO COLOR FLOW MAPG: CPT | Performed by: INTERNAL MEDICINE

## 2022-09-12 RX ORDER — TRAZODONE HYDROCHLORIDE 100 MG/1
100 TABLET ORAL
Refills: 0
Start: 2022-09-12

## 2022-09-12 RX ORDER — CLONAZEPAM 0.5 MG/1
0.25 TABLET ORAL
Status: DISCONTINUED | OUTPATIENT
Start: 2022-09-12 | End: 2022-09-12

## 2022-09-12 RX ORDER — PREDNISONE 10 MG/1
TABLET ORAL
Qty: 22 TABLET | Refills: 0 | Status: SHIPPED | OUTPATIENT
Start: 2022-09-13 | End: 2022-09-23

## 2022-09-12 RX ORDER — POLYETHYLENE GLYCOL 3350 17 G/17G
17 POWDER, FOR SOLUTION ORAL DAILY PRN
Status: DISCONTINUED | OUTPATIENT
Start: 2022-09-12 | End: 2022-09-12 | Stop reason: HOSPADM

## 2022-09-12 RX ORDER — FAMOTIDINE 20 MG/1
20 TABLET, FILM COATED ORAL 2 TIMES DAILY
Status: DISCONTINUED | OUTPATIENT
Start: 2022-09-12 | End: 2022-09-12 | Stop reason: HOSPADM

## 2022-09-12 RX ORDER — SENNOSIDES 8.6 MG
2 TABLET ORAL
Status: DISCONTINUED | OUTPATIENT
Start: 2022-09-12 | End: 2022-09-12 | Stop reason: HOSPADM

## 2022-09-12 RX ADMIN — FINASTERIDE 5 MG: 5 TABLET, FILM COATED ORAL at 08:41

## 2022-09-12 RX ADMIN — DICLOFENAC SODIUM TOPICAL GEL, 1%, 2 G: 10 GEL TOPICAL at 08:44

## 2022-09-12 RX ADMIN — IPRATROPIUM BROMIDE 0.5 MG: 0.5 SOLUTION RESPIRATORY (INHALATION) at 07:14

## 2022-09-12 RX ADMIN — AMLODIPINE BESYLATE 10 MG: 10 TABLET ORAL at 08:41

## 2022-09-12 RX ADMIN — GABAPENTIN 300 MG: 300 CAPSULE ORAL at 17:09

## 2022-09-12 RX ADMIN — FAMOTIDINE 20 MG: 10 INJECTION, SOLUTION INTRAVENOUS at 08:44

## 2022-09-12 RX ADMIN — ENOXAPARIN SODIUM 40 MG: 40 INJECTION SUBCUTANEOUS at 08:39

## 2022-09-12 RX ADMIN — GABAPENTIN 300 MG: 300 CAPSULE ORAL at 08:40

## 2022-09-12 RX ADMIN — ROFLUMILAST 500 MCG: 250 TABLET ORAL at 08:45

## 2022-09-12 RX ADMIN — CHLORTHALIDONE 25 MG: 25 TABLET ORAL at 08:45

## 2022-09-12 RX ADMIN — BUSPIRONE HYDROCHLORIDE 10 MG: 5 TABLET ORAL at 08:40

## 2022-09-12 RX ADMIN — FAMOTIDINE 20 MG: 20 TABLET ORAL at 17:09

## 2022-09-12 RX ADMIN — POLYETHYLENE GLYCOL 3350 17 G: 17 POWDER, FOR SOLUTION ORAL at 12:09

## 2022-09-12 RX ADMIN — BUSPIRONE HYDROCHLORIDE 10 MG: 5 TABLET ORAL at 17:09

## 2022-09-12 RX ADMIN — FORMOTEROL FUMARATE DIHYDRATE 20 MCG: 20 SOLUTION RESPIRATORY (INHALATION) at 07:14

## 2022-09-12 RX ADMIN — BUDESONIDE 0.5 MG: 0.5 INHALANT ORAL at 07:14

## 2022-09-12 RX ADMIN — TAMSULOSIN HYDROCHLORIDE 0.4 MG: 0.4 CAPSULE ORAL at 08:40

## 2022-09-12 RX ADMIN — IPRATROPIUM BROMIDE 0.5 MG: 0.5 SOLUTION RESPIRATORY (INHALATION) at 14:06

## 2022-09-12 RX ADMIN — LEVALBUTEROL HYDROCHLORIDE 1.25 MG: 1.25 SOLUTION, CONCENTRATE RESPIRATORY (INHALATION) at 07:14

## 2022-09-12 RX ADMIN — PREDNISONE 40 MG: 20 TABLET ORAL at 08:41

## 2022-09-12 RX ADMIN — LIDOCAINE 5% 1 PATCH: 700 PATCH TOPICAL at 08:44

## 2022-09-12 RX ADMIN — LEVALBUTEROL HYDROCHLORIDE 1.25 MG: 1.25 SOLUTION, CONCENTRATE RESPIRATORY (INHALATION) at 14:06

## 2022-09-12 NOTE — SOCIAL WORK
Palliative LSW saw patient at the bedside today  LSW appreciates the opportunity to provide patient/family with inpatient emotional support and guidance while patient continues to receive medical attention from the medical team      Topics discussed: Met with pt at bedside for continued support  Pt discussed feeling very weak and tired today  Pt had an echo completed earlier this morning  LSW inquired about fall pt had over the weekend  Pt discussed he was walking to the commode with assist x1 when he "blacked out" and fell  After fall pt has new pain in his hip/back  Pt feels his breathing is "not bad" today, but is still dealing with nausea and lack of appetite which has persisted over the past few days  Pt was able to eat a small amount of breakfast this morning, but not as much as he is typically able to eat  Pt also continues to have a very difficult time sleeping during this hospitalization  He reports he was able to sit up OOB in his chair for a short time before having the echo completed this earlier today  He remains agreeable with STR dcp to [facility], however is concerned that he only has enough insurance coverage to last 2 weeks  Pt's main goal is to be able to build enough strength to be able to return home, but is aware it is dependent on his level of safety  Pt has limited support--he states he does not usually speak with his children while he is in the hospital  Pt brought up topic of hospice during conversation with LSW as he states another provider had mentioned it to him  LSW reviewed hospice philosophy with pt  Pt firmly states he is "not ready to check out" and wishes to return to the hospital in the future for medical intervention if needed  LSW discussed with pt should his wishes at any time change in the future, hospice can always be further explored   Pt understanding of same and expressed appreciation of continued support/assistance from [facility] team      Pt denies any further questions/concerns at this time  Areas that need follow-up: Emotional Support  Resources given: None  Others present: None      I have spent 30 minutes with Patient today in which greater than 50% of this time was spent in counseling/coordination of care regarding Emotional Support      LSW will continue to follow as requested by the medical team, patient, or family

## 2022-09-12 NOTE — DISCHARGE INSTRUCTIONS
COPD: Breathing Exercises   AMBULATORY CARE:   Breathing exercises  may help you manage symptoms of COPD, such as shortness of breath  The exercises strengthen the muscles you use to breathe  They may also help you get air easier when you are having trouble breathing  Your healthcare provider will tell you how often to do these exercises  Deep breathing and coughing  can decrease your risk for a lung infection:  Take a deep breath and hold it for as long as you can  Let the air out and then cough strongly  Deep breaths help open your airway  You may be given an incentive spirometer to help you take deep breaths  Put the plastic piece in your mouth and take a slow, deep breath  Then let the air out and cough  Repeat these steps 10 times every hour  Pursed-lip breathing  can be helpful before you start an activity or any time you feel short of breath:  Breathe in through your nose  Use the muscles in your abdomen to help fill your lungs with air  Slowly breathe out through your mouth with your lips slightly puckered  You should make a quiet hissing sound as you breathe out  Try to take 2 times as long to breathe out as to breathe in  This helps you get rid of as much air from your lungs as possible  Repeat this exercise several times  When you are used to doing pursed-lip breathing, you can do it any time you need more air  Diaphragmatic breathing  will help strengthen the muscles you use to breathe:  Place one hand just below your ribs  Place your other hand in the middle of your chest over your breastbone  Breathe in slowly through your nose, as deeply as you can  Breathe out slowly through pursed lips  As you breathe out, tighten the muscles just below your ribs  Use your hand to gently push in and up while you tighten the muscles  Diaphragmatic breathing takes practice  Slowly increase the amount of time you spend during each practice session      © Copyright [employer] 2022 Information is for End User's use only and may not be sold, redistributed or otherwise used for commercial purposes  All illustrations and images included in CareNotes® are the copyrighted property of A D A M , Inc  or Razia Rojas  The above information is an  only  It is not intended as medical advice for individual conditions or treatments  Talk to your doctor, nurse or pharmacist before following any medical regimen to see if it is safe and effective for you

## 2022-09-12 NOTE — ASSESSMENT & PLAN NOTE
· On home Zemaira infusion per pulmonology    · Unable to get his infusions for the past 1 month because he was hospitalized and then had rehab   · Pulmonology consulted, patient should continue injections upon discharge

## 2022-09-12 NOTE — ASSESSMENT & PLAN NOTE
- Patient had a fall yesterday, he describes getting up from the chair with assistance from the nose and trying to get to the bedroom    He reported period of transient blood count found himself on the floor and hit his back   orthostatic blood pressures were negative for orthostatic hypotension   most likely vasovagal syncope  - Echo results, as noted above    Plan   - fall precautions

## 2022-09-12 NOTE — CASE MANAGEMENT
Case Management Discharge Planning Note    Patient name Austen Dorantes  Location S /S -01 MRN [id number]  : 1945 Date 2022       Current Admission Date: 9/3/2022  Current Admission Diagnosis:COPD with acute exacerbation [facility])   Patient Active Problem List    Diagnosis Date Noted    SIRS (systemic inflammatory response syndrome) ([address] ) 2022    Lactic acidosis 2022    Panlobular emphysema ([address] )     H/O cardiac catheterization 2022    Chest heaviness 2022    Hypertensive urgency 2022    Cognitive impairment 2021    Parkinson disease ([address] ) 2021    Anxiety 2021    Vitamin D deficiency disease 2021    Avascular necrosis of hip, right ([address] ) 2021    Depression, recurrent ([address] ) 2021    Palliative care patient 11/10/2020    Orthostatic hypotension with spine hypertension 2020    Syncope 2020    COPD with acute exacerbation ([address] ) 2019    Ambulatory dysfunction 2019    Insomnia 2019    Chronic venous insufficiency 2019    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 ([address] ) 2019    Centrilobular emphysema ([address] ) 2018    CLAYTON (acute kidney injury) ([address] ) 2018    Acute on chronic respiratory failure ([address] ) 2018    Chronic pulmonary embolism ([address] ) 2018    Former smoker 2018    Liver disease     Alpha-1-antitrypsin deficiency ([address] ) 2017    Gastroesophageal reflux disease 2017    Essential hypertension 2017    BPH (benign prostatic hyperplasia) 2017    Peripheral neuropathy 2017    Allergic rhinitis 2016    Cataracts, both eyes 2015    Chronic diarrhea  2014    Benign colon polyp 2014      LOS (days): 9  Geometric Mean LOS (GMLOS) (days): 3 60  Days to GMLOS:-5 2     OBJECTIVE:  Risk of Unplanned Readmission Score: 38 25         Current admission status: Inpatient   Preferred Pharmacy:   [facility], [address] [place] [address] [address] [address]  Phone: [phone] Fax: [phone]    [address] [facility] [address] [place]  [address] [place] [address]  Phone: [phone] Fax: [phone]    [address], [address] [place] [address] [facility]  [facility] [address]  Phone: [phone] Fax: [phone]    Primary Care Provider: Deborah Proctor MD    Primary Insurance: MEDICARE  Secondary Insurance: [employer] [place]    DISCHARGE DETAILS:    Discharge planning discussed with[de-identified] Patient, Sydnee Pham Liaison and Tayla     Comments - Freedom of Choice: CM notified all the above mentioned individuals of the Pt's d/c to [facility] today  Contacts  Patient Contacts: Terrie Gupta  Relationship to Patient[de-identified] Family  Contact Method: Phone  Phone Number: [phone]  Reason/Outcome: Discharge Planning       Transport at Discharge : [facility]  Dispatcher Contacted: Yes  Number/Name of Dispatcher: Krystle Gomez [id number]  Transported by [employer] and Unit #): SLETS  ETA of Transport (Date): 09/12/22  ETA of Transport (Time): 1530        IMM Given (Date):: 09/12/22  IMM Given to[de-identified] Patient     IMM reviewed with Pt, and agrees with discharge determination       Accepting Facility Name, [facility] : [facility] [place]  Receiving Facility/Agency Phone Number: [phone]  Facility/Agency Fax Number: [phone]

## 2022-09-12 NOTE — PHYSICAL THERAPY NOTE
PHYSICAL THERAPY NOTE      Patient Name: Fransisco DEY Date: 9/12/2022 09/12/22 0916   PT Last Visit   PT Visit Date 09/12/22   Note Type   Cancel Reasons Other     Pt to be reevaluated by PT secondary to recent fall       Kalin Stevenson PTA

## 2022-09-12 NOTE — ASSESSMENT & PLAN NOTE
· Chronically on 3 L oxygen secondary to centrilobular emphysema and alpha 1 antitrypsin deficiency  · Pulmonology on board, appreciate recommendations  · Chest x-ray with no evidence of cardiopulmonary disease , CT chest severe emphysema, chronic PE changes  · Monitor oxygen requirements and keep SpO2 over 88%  · This morning 09/12 was on 3 L, O2 sat 95%  · Continue incentive spirometry  · Treatment for COPD exacerbation as above  · Sniff test negative for diaphragmatic paralysis  · YOBANY negative for PFO, more suggestive of pulmonary arteriovenous malformation  · Plan to discharge today to short-term rehabilitation as available

## 2022-09-12 NOTE — PROGRESS NOTES
Progress Note - Pulmonary   Arvmunira Urias 65 y o  male MRN: [id number]  Unit/Bed#: S -01 Encounter: [id number]    Assessment/Plan:    1  Acute pulmonary insufficiency on chronic hypoxic respiratory failure       -  currently on home O2 requirement of 3 L-96%       -  maintain saturations greater than 88%       -  pulmonary toileting:  Deep breathing cough, OOB as tolerated, IS Q 1 hr    2  Severe COPD with acute exacerbation       -  Inpatient: Day # 1/3 prednisone 40 mg decreased by 10 mg q 3 days       -  continue home regimen: Budesonide/Perforomist b i d , Ventolin 2 puffs Q 4 p r n , Xopenex/Atrovent t i d , Daliresp 500 mg daily       -  palliative following    3  Alpha-1 antitrypsin deficiency       -  unfortunately missed weekly replacement therapy for approximately 6 weeks       -  recommend restarting ASAP upon discharge       -  administered per [facility]    4  H/O PE       -  2018- chronic PE extending left main into left lower interlobular PA       -  completed 6 months of anticoagulation       -  if worsening PE will need lifelong anticoagulation      - outpatient follow-up per discharge instructions  - pulmonary will sign off    Chief Complaint:    "I feel dizzy"    Subjective:    Abelardo was comfortably sitting in his bed  He reports he felt dizzy standing up this morning  No significant overnight events reported  Patient currently denying any fevers, chills hemoptysis, headaches, night sweats, pleuritic chest pain, or palpitations  Objective:    Vitals: Blood pressure 121/74, pulse 104, temperature 97 7 °F (36 5 °C), temperature source Oral, resp  rate 16, height 6' 5" (1 956 m), weight 86 2 kg (190 lb), SpO2 95 %  RA,Body mass index is 22 53 kg/m²        Intake/Output Summary (Last 24 hours) at 9/12/2022 1121  Last data filed at 9/12/2022 0811  Gross per 24 hour   Intake 480 ml   Output 1975 ml   Net -1495 ml       Invasive Devices  Report    Peripheral Intravenous Line  Duration Peripheral IV 09/11/22 Dorsal (posterior); Right Forearm 1 day                Physical Exam:   Physical Exam  Constitutional:       General: He is not in acute distress  Appearance: Normal appearance  He is well-developed and normal weight  He is not ill-appearing  HENT:      Head: Normocephalic and atraumatic  Nose: Nose normal  No congestion or rhinorrhea  Mouth/Throat:      Mouth: Mucous membranes are dry  Pharynx: No oropharyngeal exudate or posterior oropharyngeal erythema  Cardiovascular:      Rate and Rhythm: Normal rate and regular rhythm  Pulses: Normal pulses  Heart sounds: Normal heart sounds  No murmur heard  No friction rub  No gallop  Pulmonary:      Effort: Respiratory distress present  No tachypnea, bradypnea, accessory muscle usage or retractions  Breath sounds: Decreased air movement present  No stridor or transmitted upper airway sounds  Decreased breath sounds present  No wheezing, rhonchi or rales  Comments: Some diminished aeration  Abdominal:      General: Abdomen is flat  Bowel sounds are normal  There is no distension  Palpations: Abdomen is soft  There is no mass  Musculoskeletal:         General: No swelling or tenderness  Normal range of motion  Cervical back: Normal range of motion  No rigidity or tenderness  Skin:     General: Skin is warm and dry  Coloration: Skin is not jaundiced or pale  Neurological:      General: No focal deficit present  Mental Status: He is alert and oriented to person, place, and time  Mental status is at baseline  Psychiatric:         Mood and Affect: Mood normal          Labs:  I have personally reviewed pertinent lab results 9/13/2022    Imaging and other studies: I have personally reviewed pertinent films in PACS     CTA chest- 9/7/2022 no acute cardiopulmonary

## 2022-09-12 NOTE — PLAN OF CARE
Problem: MOBILITY - ADULT  Goal: Maintain or return to baseline ADL function  Description: INTERVENTIONS:  -  Assess patient's ability to carry out ADLs; assess patient's baseline for ADL function and identify physical deficits which impact ability to perform ADLs (bathing, care of mouth/teeth, toileting, grooming, dressing, etc )  - Assess/evaluate cause of self-care deficits   - Assess range of motion  - Assess patient's mobility; develop plan if impaired  - Assess patient's need for assistive devices and provide as appropriate  - Encourage maximum independence but intervene and supervise when necessary  - Involve family in performance of ADLs  - Assess for home care needs following discharge   - Consider OT consult to assist with ADL evaluation and planning for discharge  - Provide patient education as appropriate  Outcome: Progressing  Goal: Maintains/Returns to pre admission functional level  Description: INTERVENTIONS:  - Perform BMAT or MOVE assessment daily    - Set and communicate daily mobility goal to care team and patient/family/caregiver     - Collaborate with rehabilitation services on mobility goals if consulted  - Out of bed for toileting  - Record patient progress and toleration of activity level   Outcome: Progressing     Problem: Potential for Falls  Goal: Patient will remain free of falls  Description: INTERVENTIONS:  - Educate patient/family on patient safety including physical limitations  - Instruct patient to call for assistance with activity   - Consult OT/PT to assist with strengthening/mobility   - Keep Call bell within reach  - Keep bed low and locked with side rails adjusted as appropriate  - Keep care items and personal belongings within reach  - Initiate and maintain comfort rounds  - Make Fall Risk Sign visible to staff  - Apply yellow socks and bracelet for high fall risk patients  - Consider moving patient to room near nurses station  Outcome: Progressing     Problem: Prexisting or High Potential for Compromised Skin Integrity  Goal: Skin integrity is maintained or improved  Description: INTERVENTIONS:  - Identify patients at risk for skin breakdown  - Assess and monitor skin integrity  - Assess and monitor nutrition and hydration status  - Monitor labs   - Assess for incontinence   - Turn and reposition patient  - Assist with mobility/ambulation  - Relieve pressure over bony prominences  - Avoid friction and shearing  - Provide appropriate hygiene as needed including keeping skin clean and dry  - Evaluate need for skin moisturizer/barrier cream  - Collaborate with interdisciplinary team   - Patient/family teaching  - Consider wound care consult   Outcome: Progressing     Problem: RESPIRATORY - ADULT  Goal: Achieves optimal ventilation and oxygenation  Description: INTERVENTIONS:  - Assess for changes in respiratory status  - Assess for changes in mentation and behavior  - Position to facilitate oxygenation and minimize respiratory effort  - Oxygen administered by appropriate delivery if ordered  - Initiate smoking cessation education as indicated  - Encourage broncho-pulmonary hygiene including cough, deep breathe, Incentive Spirometry  - Assess the need for suctioning and aspirate as needed  - Assess and instruct to report SOB or any respiratory difficulty  - Respiratory Therapy support as indicated  Outcome: Progressing     Problem: DISCHARGE PLANNING  Goal: Discharge to home or other facility with appropriate resources  Description: INTERVENTIONS:  - Identify barriers to discharge w/patient and caregiver  - Arrange for needed discharge resources and transportation as appropriate  - Identify discharge learning needs (meds, wound care, etc )  - Arrange for interpretive services to assist at discharge as needed  - Refer to Case Management Department for coordinating discharge planning if the patient needs post-hospital services based on physician/advanced practitioner order or complex needs related to functional status, cognitive ability, or social support system  Outcome: Progressing

## 2022-09-12 NOTE — ASSESSMENT & PLAN NOTE
Patient returning to the hospital for another severe exacerbation of his COPD  Follows with [facility]  Last admission 8/18 for exacerbation of COPD and was on tapered dose of prednisone and required azithromycin  Several readmissions in the past 6 months for COPD exacerbation  Workup for this admission shows no evidence of infection  WBC normal, chest x-ray no evidence of cardiopulmonary disease, COVID/flu/RSV negative  YOBANY (echo) bubble study: EF 70%  No patent foramen ovale detected, more suggestive of pulmonary arteriovenous malformation  Plan; pulmonology recomendations appreciated  · Discontinued Solu-Medrol and transition to prednisone taper at 40 mg daily, reduce dose by 10 mg every 3 days  Patient currently on day 2/3 of 40 mg PO  · Atrovent/Xopenex t i d    · Budesonide/Perforomist b i d   · Daliresp 500 mg daily   · Continue performist and Pulmicort  · Respiratory protocol, airway clearance, antitussives and incentive spirometry    · Procal negative x2, No antibiotic requirements at this time  · Pulmonary rehab  · Palliative care consulted; [address] discussion given severe COPD with recurrent admissions- conversations on going, continue to follow  · Plan to discharge today to short-term rehabilitation as available

## 2022-09-12 NOTE — SOCIAL WORK
Second visit made with [facility]  Pt agreeable to completing Wish 1 of Five Wishes Document today designating his dtr [place] Kitchen as medical decision-maker  LSW met with patient and family in the clinic/hospital to discuss advance directives  5 Wishes Document reviewed with patient and family  Opportunity for questions provided and answered  5 wishes document signed by patient and 2 witnesses  Copies made and provided to patient/family  Copy provided to MMAs to be entered in patient's chart  I have spent 20 minutes with Patient  today in which greater than 50% of this time was spent in counseling/coordination of care regarding Completion of Five Wishes Document

## 2022-09-12 NOTE — ASSESSMENT & PLAN NOTE
· Low suspicion for sepsis  · 2/2 increased respiratory muscle use, deconditioning  steroid dose decreased,  which might have an accounting for the increase in lactic acid  · Received fluids overnight   · Lactic acid trending down, lactate the morning of September 10, 2020 now 2 4, down from 3 2

## 2022-09-12 NOTE — ASSESSMENT & PLAN NOTE
· Patient presented to the ED with tachycardia and tachypnea  · No fever, no evidence of infection at this time  · White blood cell normal, procalcitonin normal, blood cultures - no growth after 5 days

## 2022-09-12 NOTE — PROGRESS NOTES
The famotidine has been converted to Oral per [facility] IV-to-PO Auto-Conversion Protocol for Adults as approved by the Pharmacy and Therapeutics Committee  The patient met all eligible criteria:  3 Age = 24years old   2) Received at least one dose of the IV form   3) Receiving at least one other scheduled oral/enteral medication   4) Tolerating an oral/enteral diet   and did not have any exclusions:   1) Critical care patient   2) Active GI bleed (IF assessing H2RAs or PPIs)   3) Continuous tube feeding (IF assessing cipro, doxycycline, levofloxacin, minocycline, rifampin, or voriconazole)   4) Receiving PO vancomycin (IF assessing metronidazole)   5) Persistent nausea and/or vomiting   6) Ileus or gastrointestinal obstruction   7) Dannie/nasogastric tube set for continuous suction   8) Specific order not to automatically convert to PO (in the order's comments or if discussed in the most recent Infectious Disease or primary team's progress notes)

## 2022-09-12 NOTE — ASSESSMENT & PLAN NOTE
· Multifactorial due to age, Parkinson's disease, peripheral neuropathy, chronic medical conditions   · Recently discharged from rehab, lives at home by himself, uses a walker  · Fall precautions  · PT/OT  · patient had a fall 09/10 while trying to go to the bathroom and in the presence of a nurse    He reports transient parent of blackout after which he felt the ground hitting his back to the floor, no head injury  · x-ray of the hip done yesterday was unremarkable  ·  patient endorsed low back pain, now only tender to palpation

## 2022-09-12 NOTE — PROGRESS NOTES
Progress Note - Palliative & Supportive Care  Marshell Dakin  65 y o   male  S /S -01   MRN: [id number]  Encounter: [id number]     Assessment  Very severe COPD  Parkinson's disease  Ambulatory dysfunction  Mild cognitive impairment  Depression due to medical illness  Difficultly sleeping  Goals of care  Palliative care patient    Goals of Care  Level 1 code status  · Disease focused care  Will continue discussions regarding [address] as patient's clinical presentation evolves  · [facility]  · Five Wishes document completed today to name daughter Xiao Dexter ([phone]) as healthcare agent  · Encouraged follow up with Palliative Medicine on an outpatient basis after discharge for continued symptom management  Our office will contact patient to schedule a hospital follow up  Plan  Symptom Management  Delirium precautions  · Continue recommend global delirium precautions including:  · Establishment of day/night cycle via lights during the day and blinds open  Please limit interruptions at night as medically appropriate  · Provide glasses/hearing aids as apprioriate  · Minimize deliriogenic medications as able  · Provide reorientation including date on board and visible clock  · Avoid restraints as able, frequent verbal reorientations or patient care sitter as appropriate  Difficultly sleeping/Anxiety/Depression  · Continue buspirone 10mg TID  · Continue Mirtazapine 30mg once daily @ hs  · Continue Trazodone 100mg once daily @hs     24 Hour History  Chart reviewed before visit  Patient seen lying in bed with no family present  He is pleasant and engaged in conversation  Continues to have difficulty sleeping and may benefit from an adjustment in his sleep regimen however states he does sleep better at home than here  Did eat breakfast or lunch today  Last bowel movement was this morning    Requesting to get OOB and in the chair  Discharge to [address] is planned for this afternoon  Discussed patient's family  He lives at home alone and his daughter Kostas Barajas visits approximately once a week to bring groceries  He has a friend who stops by and helps with things like medications and household chores  Patient does have 2 sons, 1 in [place] 1 in [place]  Discussed [place] and patient wishes to name daughter Sheryle Ivans as his healthcare agent  Five Wishes Document was completed by Palliative Medicine  Lesley Gupta  Will be scanned into patient's chart to reflect his decision  Review of Systems   All other systems reviewed and are negative          Medications    Current Facility-Administered Medications:     albuterol inhalation solution 2 5 mg, 2 5 mg, Nebulization, Q4H PRN, Houston Gomes MD, 2 5 mg at 09/05/22 0126    amLODIPine (NORVASC) tablet 10 mg, 10 mg, Oral, Daily, Houston Gomes MD, 10 mg at 09/12/22 0841    budesonide (PULMICORT) inhalation solution 0 5 mg, 0 5 mg, Nebulization, Q12H, Houston Gomes MD, 0 5 mg at 09/12/22 0714    busPIRone (BUSPAR) tablet 10 mg, 10 mg, Oral, TID, Houston Gomes MD, 10 mg at 09/12/22 0840    calcium carbonate (TUMS) chewable tablet 1,000 mg, 1,000 mg, Oral, TID PRN, Liberty Reyes MD    chlorthalidone tablet 25 mg, 25 mg, Oral, Daily, Houston Gomes MD, 25 mg at 09/12/22 0845    Diclofenac Sodium (VOLTAREN) 1 % topical gel 2 g, 2 g, Topical, 4x Daily, Houston Gomes MD, 2 g at 09/12/22 0844    enoxaparin (LOVENOX) subcutaneous injection 40 mg, 40 mg, Subcutaneous, Daily, Houston Gomes MD, 40 mg at 09/12/22 0839    famotidine (PEPCID) tablet 20 mg, 20 mg, Oral, BID, Liberty Reyes MD    finasteride (PROSCAR) tablet 5 mg, 5 mg, Oral, QA, Caity Pierre MD, 5 mg at 09/12/22 0841    formoterol (PERFOROMIST) nebulizer solution 20 mcg, 20 mcg, Nebulization, Q12H, Caity Alex Sincere Garcia MD, 20 mcg at 09/12/22 0714    gabapentin (NEURONTIN) capsule 300 mg, 300 mg, Oral, TID, Festus Joshua MD, 300 mg at 09/12/22 0840    ipratropium (ATROVENT) 0 02 % inhalation solution 0 5 mg, 0 5 mg, Nebulization, TID, Hailey Vail MD, 0 5 mg at 09/12/22 1406    levalbuterol (Shellye Banner) inhalation solution 1 25 mg, 1 25 mg, Nebulization, TID, 1 25 mg at 09/12/22 1406 **AND** [DISCONTINUED] sodium chloride 0 9 % inhalation solution 3 mL, 3 mL, Nebulization, TID, Caity Pierre MD    lidocaine (LIDODERM) 5 % patch 1 patch, 1 patch, Topical, Daily, Caity Garcia MD, 1 patch at 09/12/22 0844    mirtazapine (REMERON) tablet 30 mg, 30 mg, Oral, HS, Caity Garcia MD, 30 mg at 09/11/22 2232    ondansetron (ZOFRAN) injection 4 mg, 4 mg, Intravenous, Q4H PRN, Mook Raza MD, 4 mg at 09/09/22 2134    polyethylene glycol (MIRALAX) packet 17 g, 17 g, Oral, Daily PRN, Héctor Urias MD, 17 g at 09/12/22 1209    predniSONE tablet 40 mg, 40 mg, Oral, Daily, TOPHER Hernandez, 40 mg at 09/12/22 2252    roflumilast (DALIRESP) tablet 500 mcg, 500 mcg, Oral, Daily, Damien Ferrara MD, 500 mcg at 09/12/22 0845    senna (SENOKOT) tablet 17 2 mg, 2 tablet, Oral, HS, Héctor Urias MD    [facility] capsule 0 4 mg, 0 4 mg, Oral, Daily, Hailey Vail MD, 0 4 mg at 09/12/22 0840    traZODone (DESYREL) tablet 100 mg, 100 mg, Oral, HS, Perlita Moore PA-C, 100 mg at 09/11/22 2233    Objective  /74   Pulse 104   Temp 97 7 °F (36 5 °C) (Oral)   Resp 16   Ht 6' 5" (1 956 m)   Wt 86 2 kg (190 lb)   SpO2 96%   BMI 22 53 kg/m²   Physical Exam:   Constitutional: Appears chronically ill  Comfortable and in no acute distress  Head: Normocephalic and atraumatic  Eyes: EOM are normal  No ocular discharge  No scleral icterus     Neck: no visible adenopathy or masses  Cardiac: Tachycardia, normal pulses  Respiratory: Effort normal  No stridor  No respiratory distress  No cough  Gastrointestinal: No abdominal distension  Musculoskeletal: No edema  Neurological: Alert, oriented and appropriately conversant  Skin: Dry, no diaphoresis  Psychiatric: Displays a normal mood and affect  Behavior, judgement and thought content appear normal      Lab Results: No new labs today  Imaging Studies: No new studies today  EKG, Pathology, and Other Studies: No new studies today  Counseling / Coordination of Care  Total floor / unit time spent today 45 minutes  Greater than 50% of total time was spent with the patient and / or family counseling and / or coordinating of care  A description of the counseling / coordination of care: Chart reviewed, provided medical updates, determined goals of care, discussed palliative care and symptom management, discussed and completed 5 Wishes, determined competency and POA/HCA, determined social/family support, provided psychosocial support  Reviewed with EMY HOROWITZ/Radha and MITUL Stovall MSN, CRNP, [facility]  Palliative & Supportive Care    Portions of this document may have been created using dictation software and as such some "sound alike" terms may have been generated by the system  Do not hesitate to contact me with any questions or clarifications

## 2022-09-13 ENCOUNTER — PATIENT OUTREACH (OUTPATIENT)
Dept: FAMILY MEDICINE CLINIC | Facility: CLINIC | Age: 77
End: 2022-09-13

## 2022-09-13 ENCOUNTER — TRANSITIONAL CARE MANAGEMENT (OUTPATIENT)
Dept: FAMILY MEDICINE CLINIC | Facility: CLINIC | Age: 77
End: 2022-09-13

## 2022-09-13 ENCOUNTER — TELEPHONE (OUTPATIENT)
Dept: NEUROLOGY | Facility: CLINIC | Age: 77
End: 2022-09-13

## 2022-09-13 NOTE — TELEPHONE ENCOUNTER
Contacted the patient to confirm the appointment on 09/20/00, the patient stated that he is currently inpatient  The patient requested the appointment be canceled stating that he will call back to reschedule      Appointment canceled

## 2022-09-20 ENCOUNTER — TELEPHONE (OUTPATIENT)
Dept: PALLIATIVE MEDICINE | Facility: CLINIC | Age: 77
End: 2022-09-20

## 2022-09-20 NOTE — TELEPHONE ENCOUNTER
JOSÉ MIGUEL received a message indicating the pt has an appointment for 10/4/22 at 9:20am in [place] with Dr Aniyah Alva and is in need of transportation  José Miguel was asked to speak with the pt daughter to see if she was available to transport the pt  JOSÉ MIGUEL called the pt daughter Adelaida Francios and she states that she spoke with the facility and the pt fell in the bathroom yesterday  She states that they increased his potassium and he is still Covid Positive  The pt daughter states that she has spoken with the facility and there is no definitive discharge plan or discharge date for the pt  She also states that she is not able to transport the pt  JOSÉ MIGUEL will follow up with the pt daughter regarding the discharge plan  Cm updated the clerical team about the phone call

## 2022-09-20 NOTE — TELEPHONE ENCOUNTER
I spoke with Cory Bender and he is being discharged from rehab the next two days and was diagnosed with Covid approximately 4 days ago  I did schedule a HFU apt here in [place] with Dr Maximiliano Lay the first Tuesday in October and he needs help with transportation  Could one of our  ers reach out to [place] his daughter for some transportation helps for her dad? Thank you!

## 2022-09-27 ENCOUNTER — TELEPHONE (OUTPATIENT)
Dept: PALLIATIVE MEDICINE | Facility: CLINIC | Age: 77
End: 2022-09-27

## 2022-09-27 NOTE — TELEPHONE ENCOUNTER
MSW called the pt daughter, Latosha Kaufman for an update on the pt dc  She states that there still in no definitive discharge date for the patient and she is not aware of the discharge plan  Th ept daughter states that the facility SW is also working on transportation for the pt, as it will be need to and from scheduled appointments at [facility]  MSW asked for a return call once the dc plan is established  The pt dtr was agreeable

## 2022-09-30 ENCOUNTER — TELEPHONE (OUTPATIENT)
Dept: PULMONOLOGY | Facility: CLINIC | Age: 77
End: 2022-09-30

## 2022-09-30 NOTE — TELEPHONE ENCOUNTER
Pharmacy insurance dept called to let Dr know that patient was admitted into [facility] and not sure when he will be released

## 2022-10-05 ENCOUNTER — PATIENT OUTREACH (OUTPATIENT)
Dept: FAMILY MEDICINE CLINIC | Facility: CLINIC | Age: 77
End: 2022-10-05

## 2022-10-05 ENCOUNTER — APPOINTMENT (OUTPATIENT)
Dept: RADIOLOGY | Facility: HOSPITAL | Age: 77
DRG: 189 | End: 2022-10-05
Payer: MEDICARE

## 2022-10-05 ENCOUNTER — HOSPITAL ENCOUNTER (INPATIENT)
Facility: HOSPITAL | Age: 77
LOS: 3 days | Discharge: NURSING, REHAB OR LONG-TERM CARE | DRG: 189 | End: 2022-10-10
Attending: EMERGENCY MEDICINE | Admitting: HOSPITALIST
Payer: MEDICARE

## 2022-10-05 ENCOUNTER — TELEPHONE (OUTPATIENT)
Dept: SPEECH THERAPY | Facility: CLINIC | Age: 77
End: 2022-10-05

## 2022-10-05 DIAGNOSIS — J44.1 COPD WITH ACUTE EXACERBATION (HCC): ICD-10-CM

## 2022-10-05 DIAGNOSIS — R53.1 WEAKNESS: Primary | ICD-10-CM

## 2022-10-05 DIAGNOSIS — J44.9 COPD (CHRONIC OBSTRUCTIVE PULMONARY DISEASE) (HCC): ICD-10-CM

## 2022-10-05 DIAGNOSIS — E88.01 ALPHA-1-ANTITRYPSIN DEFICIENCY (HCC): ICD-10-CM

## 2022-10-05 PROBLEM — E87.1 HYPONATREMIA: Status: ACTIVE | Noted: 2022-10-05

## 2022-10-05 PROBLEM — Z71.89 GOALS OF CARE, COUNSELING/DISCUSSION: Status: ACTIVE | Noted: 2022-10-05

## 2022-10-05 PROBLEM — Z51.5 PALLIATIVE CARE PATIENT: Status: RESOLVED | Noted: 2020-11-10 | Resolved: 2022-10-05

## 2022-10-05 LAB
2HR DELTA HS TROPONIN: 1 NG/L
ALBUMIN SERPL BCP-MCNC: 3.3 G/DL (ref 3.5–5)
ALP SERPL-CCNC: 53 U/L (ref 34–104)
ALT SERPL W P-5'-P-CCNC: 22 U/L (ref 7–52)
ANION GAP SERPL CALCULATED.3IONS-SCNC: 8 MMOL/L (ref 4–13)
ANISOCYTOSIS BLD QL SMEAR: PRESENT
AST SERPL W P-5'-P-CCNC: 31 U/L (ref 13–39)
ATRIAL RATE: 83 BPM
BASOPHILS # BLD MANUAL: 0 THOUSAND/UL (ref 0–0.1)
BASOPHILS NFR MAR MANUAL: 0 % (ref 0–1)
BILIRUB SERPL-MCNC: 0.95 MG/DL (ref 0.2–1)
BILIRUB UR QL STRIP: NEGATIVE
BNP SERPL-MCNC: 34 PG/ML (ref 0–100)
BUN SERPL-MCNC: 29 MG/DL (ref 5–25)
CALCIUM ALBUM COR SERPL-MCNC: 9.3 MG/DL (ref 8.3–10.1)
CALCIUM SERPL-MCNC: 8.7 MG/DL (ref 8.4–10.2)
CARDIAC TROPONIN I PNL SERPL HS: 8 NG/L
CARDIAC TROPONIN I PNL SERPL HS: 9 NG/L
CHLORIDE SERPL-SCNC: 94 MMOL/L (ref 96–108)
CLARITY UR: CLEAR
CO2 SERPL-SCNC: 31 MMOL/L (ref 21–32)
COLOR UR: NORMAL
CREAT SERPL-MCNC: 0.8 MG/DL (ref 0.6–1.3)
EOSINOPHIL # BLD MANUAL: 0 THOUSAND/UL (ref 0–0.4)
EOSINOPHIL NFR BLD MANUAL: 0 % (ref 0–6)
ERYTHROCYTE [DISTWIDTH] IN BLOOD BY AUTOMATED COUNT: 14.7 % (ref 11.6–15.1)
GFR SERPL CREATININE-BSD FRML MDRD: 85 ML/MIN/1.73SQ M
GLUCOSE SERPL-MCNC: 93 MG/DL (ref 65–140)
GLUCOSE UR STRIP-MCNC: NEGATIVE MG/DL
HCT VFR BLD AUTO: 34.3 % (ref 36.5–49.3)
HGB BLD-MCNC: 12.2 G/DL (ref 12–17)
HGB UR QL STRIP.AUTO: NEGATIVE
KETONES UR STRIP-MCNC: NEGATIVE MG/DL
LEUKOCYTE ESTERASE UR QL STRIP: NEGATIVE
LYMPHOCYTES # BLD AUTO: 1.68 THOUSAND/UL (ref 0.6–4.47)
LYMPHOCYTES # BLD AUTO: 18 % (ref 14–44)
MAGNESIUM SERPL-MCNC: 1.3 MG/DL (ref 1.9–2.7)
MCH RBC QN AUTO: 33.7 PG (ref 26.8–34.3)
MCHC RBC AUTO-ENTMCNC: 35.6 G/DL (ref 31.4–37.4)
MCV RBC AUTO: 95 FL (ref 82–98)
METAMYELOCYTES NFR BLD MANUAL: 1 % (ref 0–1)
MONOCYTES # BLD AUTO: 0.37 THOUSAND/UL (ref 0–1.22)
MONOCYTES NFR BLD: 4 % (ref 4–12)
MYELOCYTES NFR BLD MANUAL: 2 % (ref 0–1)
NEUTROPHILS # BLD MANUAL: 7 THOUSAND/UL (ref 1.85–7.62)
NEUTS BAND NFR BLD MANUAL: 2 % (ref 0–8)
NEUTS SEG NFR BLD AUTO: 73 % (ref 43–75)
NITRITE UR QL STRIP: NEGATIVE
P AXIS: -77 DEGREES
PH UR STRIP.AUTO: 6 [PH]
PLATELET # BLD AUTO: 113 THOUSANDS/UL (ref 149–390)
PLATELET # BLD AUTO: 113 THOUSANDS/UL (ref 149–390)
PLATELET BLD QL SMEAR: ABNORMAL
PMV BLD AUTO: 10.1 FL (ref 8.9–12.7)
PMV BLD AUTO: 10.6 FL (ref 8.9–12.7)
POLYCHROMASIA BLD QL SMEAR: PRESENT
POTASSIUM SERPL-SCNC: 3.2 MMOL/L (ref 3.5–5.3)
PR INTERVAL: 152 MS
PROT SERPL-MCNC: 5.5 G/DL (ref 6.4–8.4)
PROT UR STRIP-MCNC: NEGATIVE MG/DL
QRS AXIS: 52 DEGREES
QRSD INTERVAL: 94 MS
QT INTERVAL: 364 MS
QTC INTERVAL: 427 MS
RBC # BLD AUTO: 3.62 MILLION/UL (ref 3.88–5.62)
RBC MORPH BLD: PRESENT
SODIUM SERPL-SCNC: 133 MMOL/L (ref 135–147)
SP GR UR STRIP.AUTO: 1.02 (ref 1–1.03)
T WAVE AXIS: 30 DEGREES
TSH SERPL DL<=0.05 MIU/L-ACNC: 0.87 UIU/ML (ref 0.45–4.5)
UROBILINOGEN UR STRIP-ACNC: <2 MG/DL
VENTRICULAR RATE: 83 BPM
WBC # BLD AUTO: 9.33 THOUSAND/UL (ref 4.31–10.16)

## 2022-10-05 PROCEDURE — 83735 ASSAY OF MAGNESIUM: CPT | Performed by: EMERGENCY MEDICINE

## 2022-10-05 PROCEDURE — 96365 THER/PROPH/DIAG IV INF INIT: CPT

## 2022-10-05 PROCEDURE — 99220 PR INITIAL OBSERVATION CARE/DAY 70 MINUTES: CPT | Performed by: HOSPITALIST

## 2022-10-05 PROCEDURE — 93010 ELECTROCARDIOGRAM REPORT: CPT | Performed by: INTERNAL MEDICINE

## 2022-10-05 PROCEDURE — 96375 TX/PRO/DX INJ NEW DRUG ADDON: CPT

## 2022-10-05 PROCEDURE — 93005 ELECTROCARDIOGRAM TRACING: CPT

## 2022-10-05 PROCEDURE — 84484 ASSAY OF TROPONIN QUANT: CPT | Performed by: EMERGENCY MEDICINE

## 2022-10-05 PROCEDURE — 96361 HYDRATE IV INFUSION ADD-ON: CPT

## 2022-10-05 PROCEDURE — 84443 ASSAY THYROID STIM HORMONE: CPT | Performed by: EMERGENCY MEDICINE

## 2022-10-05 PROCEDURE — 81003 URINALYSIS AUTO W/O SCOPE: CPT | Performed by: EMERGENCY MEDICINE

## 2022-10-05 PROCEDURE — 36415 COLL VENOUS BLD VENIPUNCTURE: CPT | Performed by: EMERGENCY MEDICINE

## 2022-10-05 PROCEDURE — 87081 CULTURE SCREEN ONLY: CPT | Performed by: INTERNAL MEDICINE

## 2022-10-05 PROCEDURE — 80053 COMPREHEN METABOLIC PANEL: CPT | Performed by: EMERGENCY MEDICINE

## 2022-10-05 PROCEDURE — 83880 ASSAY OF NATRIURETIC PEPTIDE: CPT | Performed by: EMERGENCY MEDICINE

## 2022-10-05 PROCEDURE — 85049 AUTOMATED PLATELET COUNT: CPT

## 2022-10-05 PROCEDURE — 85007 BL SMEAR W/DIFF WBC COUNT: CPT | Performed by: EMERGENCY MEDICINE

## 2022-10-05 PROCEDURE — 85027 COMPLETE CBC AUTOMATED: CPT | Performed by: EMERGENCY MEDICINE

## 2022-10-05 PROCEDURE — 71045 X-RAY EXAM CHEST 1 VIEW: CPT

## 2022-10-05 PROCEDURE — 99285 EMERGENCY DEPT VISIT HI MDM: CPT | Performed by: EMERGENCY MEDICINE

## 2022-10-05 PROCEDURE — 99285 EMERGENCY DEPT VISIT HI MDM: CPT

## 2022-10-05 RX ORDER — POTASSIUM CHLORIDE 20 MEQ/1
40 TABLET, EXTENDED RELEASE ORAL ONCE
Status: COMPLETED | OUTPATIENT
Start: 2022-10-05 | End: 2022-10-05

## 2022-10-05 RX ORDER — TAMSULOSIN HYDROCHLORIDE 0.4 MG/1
0.4 CAPSULE ORAL DAILY
Status: DISCONTINUED | OUTPATIENT
Start: 2022-10-06 | End: 2022-10-10 | Stop reason: HOSPADM

## 2022-10-05 RX ORDER — MIRTAZAPINE 15 MG/1
30 TABLET, FILM COATED ORAL
Status: DISCONTINUED | OUTPATIENT
Start: 2022-10-05 | End: 2022-10-10 | Stop reason: HOSPADM

## 2022-10-05 RX ORDER — BUDESONIDE 0.5 MG/2ML
0.5 INHALANT ORAL
Status: DISCONTINUED | OUTPATIENT
Start: 2022-10-06 | End: 2022-10-10 | Stop reason: HOSPADM

## 2022-10-05 RX ORDER — ENOXAPARIN SODIUM 100 MG/ML
40 INJECTION SUBCUTANEOUS DAILY
Status: DISCONTINUED | OUTPATIENT
Start: 2022-10-06 | End: 2022-10-10 | Stop reason: HOSPADM

## 2022-10-05 RX ORDER — AMLODIPINE BESYLATE 10 MG/1
10 TABLET ORAL DAILY
Status: DISCONTINUED | OUTPATIENT
Start: 2022-10-06 | End: 2022-10-10 | Stop reason: HOSPADM

## 2022-10-05 RX ORDER — PANTOPRAZOLE SODIUM 20 MG/1
20 TABLET, DELAYED RELEASE ORAL
Status: DISCONTINUED | OUTPATIENT
Start: 2022-10-06 | End: 2022-10-10 | Stop reason: HOSPADM

## 2022-10-05 RX ORDER — CHLORTHALIDONE 25 MG/1
25 TABLET ORAL DAILY
Status: DISCONTINUED | OUTPATIENT
Start: 2022-10-06 | End: 2022-10-10 | Stop reason: HOSPADM

## 2022-10-05 RX ORDER — ALBUTEROL SULFATE 90 UG/1
2 AEROSOL, METERED RESPIRATORY (INHALATION) EVERY 4 HOURS PRN
Status: DISCONTINUED | OUTPATIENT
Start: 2022-10-05 | End: 2022-10-10 | Stop reason: HOSPADM

## 2022-10-05 RX ORDER — MAGNESIUM SULFATE HEPTAHYDRATE 40 MG/ML
2 INJECTION, SOLUTION INTRAVENOUS ONCE
Status: COMPLETED | OUTPATIENT
Start: 2022-10-05 | End: 2022-10-05

## 2022-10-05 RX ORDER — LEVALBUTEROL 1.25 MG/.5ML
1.25 SOLUTION, CONCENTRATE RESPIRATORY (INHALATION) ONCE
Status: COMPLETED | OUTPATIENT
Start: 2022-10-05 | End: 2022-10-05

## 2022-10-05 RX ORDER — GABAPENTIN 300 MG/1
300 CAPSULE ORAL 3 TIMES DAILY
Status: DISCONTINUED | OUTPATIENT
Start: 2022-10-05 | End: 2022-10-10 | Stop reason: HOSPADM

## 2022-10-05 RX ORDER — MIDODRINE HYDROCHLORIDE 5 MG/1
10 TABLET ORAL 3 TIMES DAILY PRN
Status: DISCONTINUED | OUTPATIENT
Start: 2022-10-05 | End: 2022-10-10 | Stop reason: HOSPADM

## 2022-10-05 RX ORDER — FLUTICASONE PROPIONATE 50 MCG
2 SPRAY, SUSPENSION (ML) NASAL DAILY
Status: DISCONTINUED | OUTPATIENT
Start: 2022-10-06 | End: 2022-10-10 | Stop reason: HOSPADM

## 2022-10-05 RX ORDER — BUDESONIDE 0.5 MG/2ML
0.5 INHALANT ORAL
Status: DISCONTINUED | OUTPATIENT
Start: 2022-10-05 | End: 2022-10-05

## 2022-10-05 RX ORDER — LEVALBUTEROL 1.25 MG/.5ML
1.25 SOLUTION, CONCENTRATE RESPIRATORY (INHALATION) EVERY 6 HOURS PRN
Status: DISCONTINUED | OUTPATIENT
Start: 2022-10-06 | End: 2022-10-10 | Stop reason: HOSPADM

## 2022-10-05 RX ORDER — TRAZODONE HYDROCHLORIDE 100 MG/1
100 TABLET ORAL
Status: DISCONTINUED | OUTPATIENT
Start: 2022-10-05 | End: 2022-10-10 | Stop reason: HOSPADM

## 2022-10-05 RX ORDER — BUSPIRONE HYDROCHLORIDE 5 MG/1
10 TABLET ORAL 3 TIMES DAILY
Status: DISCONTINUED | OUTPATIENT
Start: 2022-10-05 | End: 2022-10-10 | Stop reason: HOSPADM

## 2022-10-05 RX ORDER — CHOLESTYRAMINE LIGHT 4 G/5.7G
4 POWDER, FOR SUSPENSION ORAL
Status: DISCONTINUED | OUTPATIENT
Start: 2022-10-05 | End: 2022-10-10 | Stop reason: HOSPADM

## 2022-10-05 RX ORDER — FINASTERIDE 5 MG/1
5 TABLET, FILM COATED ORAL EVERY MORNING
Status: DISCONTINUED | OUTPATIENT
Start: 2022-10-06 | End: 2022-10-10 | Stop reason: HOSPADM

## 2022-10-05 RX ORDER — METHYLPREDNISOLONE SODIUM SUCCINATE 125 MG/2ML
125 INJECTION, POWDER, LYOPHILIZED, FOR SOLUTION INTRAMUSCULAR; INTRAVENOUS ONCE
Status: COMPLETED | OUTPATIENT
Start: 2022-10-05 | End: 2022-10-05

## 2022-10-05 RX ADMIN — CARBIDOPA AND LEVODOPA 1 TABLET: 25; 100 TABLET ORAL at 22:44

## 2022-10-05 RX ADMIN — METHYLPREDNISOLONE SODIUM SUCCINATE 125 MG: 125 INJECTION, POWDER, FOR SOLUTION INTRAMUSCULAR; INTRAVENOUS at 13:11

## 2022-10-05 RX ADMIN — POTASSIUM CHLORIDE 40 MEQ: 1500 TABLET, EXTENDED RELEASE ORAL at 14:17

## 2022-10-05 RX ADMIN — GABAPENTIN 300 MG: 300 CAPSULE ORAL at 22:44

## 2022-10-05 RX ADMIN — SODIUM CHLORIDE 1000 ML: 0.9 INJECTION, SOLUTION INTRAVENOUS at 13:02

## 2022-10-05 RX ADMIN — BUSPIRONE HYDROCHLORIDE 10 MG: 5 TABLET ORAL at 22:44

## 2022-10-05 RX ADMIN — TRAZODONE HYDROCHLORIDE 100 MG: 100 TABLET ORAL at 22:44

## 2022-10-05 RX ADMIN — CHOLESTYRAMINE 4 G: 4 POWDER, FOR SUSPENSION ORAL at 22:44

## 2022-10-05 RX ADMIN — MAGNESIUM SULFATE HEPTAHYDRATE 2 G: 40 INJECTION, SOLUTION INTRAVENOUS at 13:15

## 2022-10-05 RX ADMIN — LEVALBUTEROL HYDROCHLORIDE 1.25 MG: 1.25 SOLUTION, CONCENTRATE RESPIRATORY (INHALATION) at 13:04

## 2022-10-05 RX ADMIN — MIRTAZAPINE 30 MG: 15 TABLET, FILM COATED ORAL at 22:44

## 2022-10-05 NOTE — ED PROVIDER NOTES
History  Chief Complaint   Patient presents with   • Weakness - Generalized     Pt came in from [facility] to be evaluated for generalized weakness & lightheadedness for the past few days  69-year-old gentleman comes in for evaluation of generalized weakness  Patient has a history of severe COPD and is currently in a skilled nursing facility  He states that over the last several days he has become more weak and now lightheaded feeling like he is going to pass out  Patient states he also feels like his breathing is is more labored  Patient denies fever or chills  He does complain of a chronic cough but does not bring up anything  History provided by:  Patient   used: No    Fatigue  Severity:  Moderate  Onset quality:  Gradual  Timing:  Constant  Progression:  Worsening  Chronicity:  Recurrent  Ineffective treatments:  None tried  Associated symptoms: cough, difficulty walking and near-syncope    Associated symptoms: no abdominal pain, no arthralgias, no chest pain, no dizziness, no fever, no headaches, no seizures and no vomiting    Risk factors: coronary artery disease    Risk factors: no new medications        Prior to Admission Medications   Prescriptions Last Dose Informant Patient Reported? Taking? Ventolin  (90 Base) MCG/ACT inhaler  Self No No   Sig: Inhale 2 puffs every 4 (four) hours as needed for wheezing   amLODIPine (NORVASC) 10 mg tablet  Self No No   Sig: TAKE ONE TABLET BY MOUTH DAILY    budesonide (PULMICORT) 0 5 mg/2 mL nebulizer solution   No No   Sig: Take 2 mL (0 5 mg total) by nebulization in the morning and 2 mL (0 5 mg total) in the evening     busPIRone (BUSPAR) 10 mg tablet   No No   Sig: TAKE 1 TABLET BY MOUTH 3 TIMES DAILY   carbidopa-levodopa (SINEMET)  mg per tablet   No No   Sig: take 1 tablet by mouth prior to each meal   chlorthalidone 25 mg tablet   No No   Sig: Take 1 tablet (25 mg total) by mouth daily   cholestyramine (QUESTRAN) 4 g packet   No No   Sig: Take 1 packet (4 g total) by mouth in the morning  finasteride (PROSCAR) 5 mg tablet  Self No No   Sig: TAKE ONE TABLET BY MOUTH IN THE MORNING    fluticasone (FLONASE) 50 mcg/act nasal spray  Self No No   Si sprays into each nostril daily   gabapentin (NEURONTIN) 300 mg capsule   No No   Sig: TAKE 1 CAPSULE BY MOUTH 3 TIMES DAILY   ipratropium (ATROVENT) 0 02 % nebulizer solution   No No   Sig: Take 2 5 mL (0 5 mg total) by nebulization 3 (three) times a day   midodrine (PROAMATINE) 10 MG tablet   No No   Sig: Take 1 tab three times daily AS NEEDED if SBP less than 100   Please hold medication if blood pressure is above 146 systolic    mirtazapine (REMERON) 15 mg tablet   No No   Sig: Take 2 tablets (30 mg total) by mouth daily at bedtime   pantoprazole (PROTONIX) 40 mg tablet   No No   Sig: TAKE ONE TABLET BY MOUTH TWICE DAILY   roflumilast (DALIRESP) 250 MCG tablet   No No   Sig: Take 1 tablet (250 mcg total) by mouth daily for 28 days   tamsulosin (FLOMAX) 0 4 mg   No No   Sig: TAKE ONE CAPSULE BY MOUTH ONE TIME DAILY   traZODone (DESYREL) 100 mg tablet   No No   Sig: Take 1 tablet (100 mg total) by mouth daily at bedtime      Facility-Administered Medications: None       Past Medical History:   Diagnosis Date   • Anesthesia complication     Difficult to wake up   • Arthritis    • BPH (benign prostatic hyperplasia)     urinary frequency   • Cancer (HCC)     basal cell neck, face   • CKD (chronic kidney disease) stage 2, GFR 60-89 ml/min 2022   • COPD (chronic obstructive pulmonary disease) ([facility])    • COPD exacerbation ([facility]) 2019   • Coronary artery disease    • Elevated PSA 10/25/2018   • Full dentures    • Hiatal hernia    • History of methicillin resistant staphylococcus aureus (MRSA)     10/11/2018 MRSA (nares) positive   • Hypertension    • Irritable bowel syndrome    • Kidney stone     at least 7 episodes   • Liver disease     Alpha 1- enzyme deficiency - diagnosed 2002  has been on weekly replacement therapy since then   • Parkinson disease ([facility] [address] )    • Pulmonary emphysema ([facility] [address] )     1/25/15  FEV1 - 2 45 liters or 59% of predicted   • RSV infection 12/2017   • SIRS (systemic inflammatory response syndrome) ([facility] [address] ) 8/14/2022   • Wears glasses     for driving only       Past Surgical History:   Procedure Laterality Date   • BACK SURGERY  2008    discectomy   • CARDIAC CATHETERIZATION N/A 5/3/2022    Procedure: Cardiac catheterization both left and right heart catheterization with vaso dilatory trial;  Surgeon: Renetta Luis MD;  Location: [place] [address] CATH LAB; Service: Cardiology   • CARDIAC CATHETERIZATION Left 5/3/2022    Procedure: Cardiac Left Heart Cath;  Surgeon: Renetta Luis MD;  Location: [place] [address] CATH LAB; Service: Cardiology   • CARDIAC CATHETERIZATION Left 5/3/2022    Procedure: Cardiac Left Ventriculogram;  Surgeon: Renetta Luis MD;  Location: [place] [address] CATH LAB; Service: Cardiology   • COLONOSCOPY     • COLONOSCOPY N/A 3/10/2017    Procedure: Virginia Luis;  Surgeon: Brianne Goldsmith MD;  Location: [facility] GI LAB; Service:    • CYSTOSCOPY      removal of kidney stones   • ESOPHAGOGASTRODUODENOSCOPY N/A 3/10/2017    Procedure: ESOPHAGOGASTRODUODENOSCOPY (EGD); Surgeon: Brianne Goldsmith MD;  Location: [facility] GI LAB; Service:    • LITHOTRIPSY     • OK ESOPHAGOGASTRODUODENOSCOPY TRANSORAL DIAGNOSTIC N/A 11/20/2018    Procedure: ESOPHAGOGASTRODUODENOSCOPY (EGD); Surgeon: Brianne Goldsmith MD;  Location: [facility] GI LAB; Service: Gastroenterology   • TONSILLECTOMY     • VEIN LIGATION AND STRIPPING Bilateral     17's       Family History   Problem Relation Age of Onset   • Emphysema Mother         never smoked   • Emphysema Father    • Cancer Brother         colon   • Colon cancer Brother    • Ulcerative colitis Family    • Liver disease Family      I have reviewed and agree with the history as documented      E-Cigarette/Vaping   • E-Cigarette Use Never User E-Cigarette/Vaping Substances   • Nicotine No    • THC No    • CBD No    • Flavoring No    • Other No    • Unknown No      Social History     Tobacco Use   • Smoking status: Former Smoker     Packs/day: 1 00     Years: 60 00     Pack years: 60 00     Quit date: 2017     Years since quittin 0   • Smokeless tobacco: Never Used   • Tobacco comment: quit in 2017   Vaping Use   • Vaping Use: Never used   Substance Use Topics   • Alcohol use: Never     Comment: stopped in    • Drug use: Not Currently       Review of Systems   Constitutional: Positive for fatigue  Negative for activity change, appetite change and fever  HENT: Negative for congestion and facial swelling  Eyes: Negative for discharge and redness  Respiratory: Positive for cough  Negative for wheezing  Cardiovascular: Positive for near-syncope  Negative for chest pain and leg swelling  Gastrointestinal: Negative for abdominal distention, abdominal pain, blood in stool and vomiting  Endocrine: Negative for cold intolerance and polydipsia  Genitourinary: Negative for difficulty urinating and hematuria  Musculoskeletal: Negative for arthralgias and gait problem  Skin: Negative for color change and rash  Allergic/Immunologic: Negative for food allergies and immunocompromised state  Neurological: Negative for dizziness, seizures and headaches  Hematological: Negative for adenopathy  Does not bruise/bleed easily  Psychiatric/Behavioral: Negative for agitation, confusion and decreased concentration  All other systems reviewed and are negative  Physical Exam  Physical Exam  Vitals and nursing note reviewed  Constitutional:       Appearance: He is well-developed  He is ill-appearing, toxic-appearing and diaphoretic  HENT:      Head: Normocephalic and atraumatic        Right Ear: Tympanic membrane normal       Left Ear: Tympanic membrane normal       Nose: Nose normal    Eyes:      General: Lids are normal  Conjunctiva/sclera: Conjunctivae normal       Pupils: Pupils are equal, round, and reactive to light  Neck:      Vascular: No carotid bruit or JVD  Trachea: Trachea normal    Cardiovascular:      Rate and Rhythm: Normal rate and regular rhythm  No extrasystoles are present  Heart sounds: Normal heart sounds  Pulmonary:      Effort: Pulmonary effort is normal       Breath sounds: Decreased breath sounds present  No wheezing, rhonchi or rales  Chest:      Chest wall: No deformity or tenderness  Abdominal:      General: Bowel sounds are normal       Palpations: Abdomen is soft  Tenderness: There is no abdominal tenderness  There is no guarding or rebound  Musculoskeletal:      Right shoulder: No swelling, deformity or tenderness  Normal range of motion  Cervical back: Normal range of motion and neck supple  No deformity, tenderness or bony tenderness  Lymphadenopathy:      Cervical: No cervical adenopathy  Skin:     General: Skin is warm  Neurological:      Mental Status: He is alert and oriented to person, place, and time  Cranial Nerves: No cranial nerve deficit  Sensory: No sensory deficit  Deep Tendon Reflexes: Reflexes are normal and symmetric  Psychiatric:         Speech: Speech normal          Behavior: Behavior normal          Thought Content:  Thought content normal          Judgment: Judgment normal          Vital Signs  ED Triage Vitals [10/05/22 1206]   Temperature Pulse Respirations Blood Pressure SpO2   98 9 °F (37 2 °C) 87 20 129/63 94 %      Temp Source Heart Rate Source Patient Position - Orthostatic VS BP Location FiO2 (%)   Oral Monitor -- Right arm --      Pain Score       --           Vitals:    10/05/22 1206   BP: 129/63   Pulse: 87         Visual Acuity      ED Medications  Medications   sodium chloride 0 9 % bolus 1,000 mL (has no administration in time range)   magnesium sulfate 2 g/50 mL IVPB (premix) 2 g (has no administration in time range)   methylPREDNISolone sodium succinate (Solu-MEDROL) injection 125 mg (has no administration in time range)   levalbuterol (XOPENEX) inhalation solution 1 25 mg (has no administration in time range)       Diagnostic Studies  Results Reviewed     Procedure Component Value Units Date/Time    CBC and differential [[id number]] Collected: 10/05/22 1252    Lab Status: No result Specimen: Blood from Arm, Left     Comprehensive metabolic panel [[id number]] Collected: 10/05/22 1252    Lab Status: No result Specimen: Blood from Arm, Left     TSH [[id number]] Collected: 10/05/22 1252    Lab Status: No result Specimen: Blood from Arm, Left     Magnesium [[id number]] Collected: 10/05/22 1252    Lab Status: No result Specimen: Blood from Arm, Left     HS Troponin 0hr (reflex protocol) [[id number]] Collected: 10/05/22 1252    Lab Status: No result Specimen: Blood from Arm, Left     B-Type Natriuretic Peptide(BNP) AN, CA, EA Campuses Only [[id number]] Collected: 10/05/22 1252    Lab Status: No result Specimen: Blood from Arm, Left     UA w Reflex to Microscopic w Reflex to Culture [[id number]]     Lab Status: No result Specimen: Urine                  No orders to display              Procedures  ECG 12 Lead Documentation Only    Date/Time: 10/5/2022 12:15 PM  Performed by: Ashlee Drake DO  Authorized by: Ashlee Drake DO     Patient location:  ED  Rate:     ECG rate:  83  Rhythm:     Rhythm: sinus rhythm               ED Course                                             MDM    Disposition  Final diagnoses:   None     ED Disposition     None      Follow-up Information    None         Patient's Medications   Discharge Prescriptions    No medications on file       No discharge procedures on file      PDMP Review       Value Time User    PDMP Reviewed  Yes 4/5/2022  9:56 AM Caitlin Choe MD          ED Provider  Electronically Signed by           Ashlee Drake DO  10/05/22 2362

## 2022-10-06 LAB
ANION GAP SERPL CALCULATED.3IONS-SCNC: 9 MMOL/L (ref 4–13)
BASOPHILS # BLD MANUAL: 0 THOUSAND/UL (ref 0–0.1)
BASOPHILS NFR MAR MANUAL: 0 % (ref 0–1)
BUN SERPL-MCNC: 26 MG/DL (ref 5–25)
CALCIUM SERPL-MCNC: 8.9 MG/DL (ref 8.4–10.2)
CHLORIDE SERPL-SCNC: 97 MMOL/L (ref 96–108)
CO2 SERPL-SCNC: 29 MMOL/L (ref 21–32)
CREAT SERPL-MCNC: 0.82 MG/DL (ref 0.6–1.3)
EOSINOPHIL # BLD MANUAL: 0 THOUSAND/UL (ref 0–0.4)
EOSINOPHIL NFR BLD MANUAL: 0 % (ref 0–6)
ERYTHROCYTE [DISTWIDTH] IN BLOOD BY AUTOMATED COUNT: 14.7 % (ref 11.6–15.1)
GFR SERPL CREATININE-BSD FRML MDRD: 84 ML/MIN/1.73SQ M
GLUCOSE SERPL-MCNC: 146 MG/DL (ref 65–140)
HCT VFR BLD AUTO: 35.3 % (ref 36.5–49.3)
HGB BLD-MCNC: 12.6 G/DL (ref 12–17)
LYMPHOCYTES # BLD AUTO: 1.02 THOUSAND/UL (ref 0.6–4.47)
LYMPHOCYTES # BLD AUTO: 9 % (ref 14–44)
MAGNESIUM SERPL-MCNC: 1.7 MG/DL (ref 1.9–2.7)
MCH RBC QN AUTO: 33.6 PG (ref 26.8–34.3)
MCHC RBC AUTO-ENTMCNC: 35.7 G/DL (ref 31.4–37.4)
MCV RBC AUTO: 94 FL (ref 82–98)
MONOCYTES # BLD AUTO: 0.11 THOUSAND/UL (ref 0–1.22)
MONOCYTES NFR BLD: 1 % (ref 4–12)
NEUTROPHILS # BLD MANUAL: 10.22 THOUSAND/UL (ref 1.85–7.62)
NEUTS BAND NFR BLD MANUAL: 1 % (ref 0–8)
NEUTS SEG NFR BLD AUTO: 89 % (ref 43–75)
PLATELET # BLD AUTO: 124 THOUSANDS/UL (ref 149–390)
PLATELET BLD QL SMEAR: ABNORMAL
PMV BLD AUTO: 10 FL (ref 8.9–12.7)
POTASSIUM SERPL-SCNC: 4.6 MMOL/L (ref 3.5–5.3)
RBC # BLD AUTO: 3.75 MILLION/UL (ref 3.88–5.62)
RBC MORPH BLD: NORMAL
SODIUM SERPL-SCNC: 135 MMOL/L (ref 135–147)
WBC # BLD AUTO: 11.35 THOUSAND/UL (ref 4.31–10.16)

## 2022-10-06 PROCEDURE — 99223 1ST HOSP IP/OBS HIGH 75: CPT | Performed by: INTERNAL MEDICINE

## 2022-10-06 PROCEDURE — 94640 AIRWAY INHALATION TREATMENT: CPT

## 2022-10-06 PROCEDURE — 85027 COMPLETE CBC AUTOMATED: CPT | Performed by: INTERNAL MEDICINE

## 2022-10-06 PROCEDURE — 80048 BASIC METABOLIC PNL TOTAL CA: CPT | Performed by: INTERNAL MEDICINE

## 2022-10-06 PROCEDURE — 99232 SBSQ HOSP IP/OBS MODERATE 35: CPT | Performed by: HOSPITALIST

## 2022-10-06 PROCEDURE — 83735 ASSAY OF MAGNESIUM: CPT | Performed by: INTERNAL MEDICINE

## 2022-10-06 PROCEDURE — 85007 BL SMEAR W/DIFF WBC COUNT: CPT | Performed by: INTERNAL MEDICINE

## 2022-10-06 PROCEDURE — 94760 N-INVAS EAR/PLS OXIMETRY 1: CPT

## 2022-10-06 RX ORDER — METHYLPREDNISOLONE SODIUM SUCCINATE 40 MG/ML
40 INJECTION, POWDER, LYOPHILIZED, FOR SOLUTION INTRAMUSCULAR; INTRAVENOUS EVERY 8 HOURS SCHEDULED
Status: DISCONTINUED | OUTPATIENT
Start: 2022-10-06 | End: 2022-10-06

## 2022-10-06 RX ADMIN — CARBIDOPA AND LEVODOPA 1 TABLET: 25; 100 TABLET ORAL at 09:00

## 2022-10-06 RX ADMIN — TRAZODONE HYDROCHLORIDE 100 MG: 100 TABLET ORAL at 21:59

## 2022-10-06 RX ADMIN — BUDESONIDE 0.5 MG: 0.5 INHALANT ORAL at 21:40

## 2022-10-06 RX ADMIN — BUSPIRONE HYDROCHLORIDE 10 MG: 5 TABLET ORAL at 09:00

## 2022-10-06 RX ADMIN — PANTOPRAZOLE SODIUM 20 MG: 20 TABLET, DELAYED RELEASE ORAL at 06:20

## 2022-10-06 RX ADMIN — IPRATROPIUM BROMIDE 0.5 MG: 0.5 SOLUTION RESPIRATORY (INHALATION) at 13:10

## 2022-10-06 RX ADMIN — FINASTERIDE 5 MG: 5 TABLET, FILM COATED ORAL at 09:00

## 2022-10-06 RX ADMIN — GABAPENTIN 300 MG: 300 CAPSULE ORAL at 21:59

## 2022-10-06 RX ADMIN — BUSPIRONE HYDROCHLORIDE 10 MG: 5 TABLET ORAL at 21:59

## 2022-10-06 RX ADMIN — ENOXAPARIN SODIUM 40 MG: 40 INJECTION SUBCUTANEOUS at 09:01

## 2022-10-06 RX ADMIN — PANTOPRAZOLE SODIUM 20 MG: 20 TABLET, DELAYED RELEASE ORAL at 17:19

## 2022-10-06 RX ADMIN — METHYLPREDNISOLONE SODIUM SUCCINATE 40 MG: 40 INJECTION, POWDER, FOR SOLUTION INTRAMUSCULAR; INTRAVENOUS at 11:10

## 2022-10-06 RX ADMIN — FLUTICASONE PROPIONATE 2 SPRAY: 50 SPRAY, METERED NASAL at 09:01

## 2022-10-06 RX ADMIN — BUDESONIDE 0.5 MG: 0.5 INHALANT ORAL at 08:16

## 2022-10-06 RX ADMIN — GABAPENTIN 300 MG: 300 CAPSULE ORAL at 17:19

## 2022-10-06 RX ADMIN — AMLODIPINE BESYLATE 10 MG: 10 TABLET ORAL at 09:01

## 2022-10-06 RX ADMIN — GABAPENTIN 300 MG: 300 CAPSULE ORAL at 09:00

## 2022-10-06 RX ADMIN — MIRTAZAPINE 30 MG: 15 TABLET, FILM COATED ORAL at 21:59

## 2022-10-06 RX ADMIN — BUSPIRONE HYDROCHLORIDE 10 MG: 5 TABLET ORAL at 17:19

## 2022-10-06 RX ADMIN — IPRATROPIUM BROMIDE 0.5 MG: 0.5 SOLUTION RESPIRATORY (INHALATION) at 21:39

## 2022-10-06 RX ADMIN — IPRATROPIUM BROMIDE 0.5 MG: 0.5 SOLUTION RESPIRATORY (INHALATION) at 08:15

## 2022-10-06 RX ADMIN — CARBIDOPA AND LEVODOPA 1 TABLET: 25; 100 TABLET ORAL at 21:59

## 2022-10-06 RX ADMIN — LEVALBUTEROL HYDROCHLORIDE 1.25 MG: 1.25 SOLUTION, CONCENTRATE RESPIRATORY (INHALATION) at 13:10

## 2022-10-06 RX ADMIN — CARBIDOPA AND LEVODOPA 1 TABLET: 25; 100 TABLET ORAL at 17:19

## 2022-10-06 RX ADMIN — TAMSULOSIN HYDROCHLORIDE 0.4 MG: 0.4 CAPSULE ORAL at 09:00

## 2022-10-06 RX ADMIN — CHOLESTYRAMINE 4 G: 4 POWDER, FOR SUSPENSION ORAL at 21:59

## 2022-10-06 RX ADMIN — CHLORTHALIDONE 25 MG: 25 TABLET ORAL at 09:01

## 2022-10-06 NOTE — PLAN OF CARE
Problem: MOBILITY - ADULT  Goal: Maintain or return to baseline ADL function  Description: INTERVENTIONS:  -  Assess patient's ability to carry out ADLs; assess patient's baseline for ADL function and identify physical deficits which impact ability to perform ADLs (bathing, care of mouth/teeth, toileting, grooming, dressing, etc )  - Assess/evaluate cause of self-care deficits   - Assess range of motion  - Assess patient's mobility; develop plan if impaired  - Assess patient's need for assistive devices and provide as appropriate  - Encourage maximum independence but intervene and supervise when necessary  - Involve family in performance of ADLs  - Assess for home care needs following discharge   - Consider OT consult to assist with ADL evaluation and planning for discharge  - Provide patient education as appropriate  Outcome: Progressing       Problem: Prexisting or High Potential for Compromised Skin Integrity  Goal: Skin integrity is maintained or improved  Description: INTERVENTIONS:  - Identify patients at risk for skin breakdown  - Assess and monitor skin integrity  - Assess and monitor nutrition and hydration status  - Monitor labs   - Assess for incontinence   - Turn and reposition patient  - Assist with mobility/ambulation  - Relieve pressure over bony prominences  - Avoid friction and shearing  - Provide appropriate hygiene as needed including keeping skin clean and dry  - Evaluate need for skin moisturizer/barrier cream  - Collaborate with interdisciplinary team   - Patient/family teaching  - Consider wound care consult   Outcome: Progressing

## 2022-10-06 NOTE — ASSESSMENT & PLAN NOTE
· Patient with a history of centrilobular emphysema and alpha-1 antitrypsin deficiency  · Presenting with 2 days of worsening exertional shortness of breath associated with dry cough  He has also endorsing generalized weakness, lightheadedness, nausea, stomach upset    · Patient also endorses epigastric midsternal pressure, 2/10, nonradiating, worse when getting up from bed or exerting himself  · Patient on 3 L oxygen chronically at home  · Prior hospitalization in September:  Treated for COPD exacerbation, sniff test negative for diaphragm paralysis, delvis negative for PFO 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  · Chest x-ray shows background emphysema, no acute pathology noted  · This is likely due to deconditioning from the respiratory fatigue    Plan:  · Will resume home inhalers and home medications  · Pulmonary consult  · Likely palliative consult

## 2022-10-06 NOTE — ASSESSMENT & PLAN NOTE
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

## 2022-10-06 NOTE — ACP (ADVANCE CARE PLANNING)
Patient's daughter was called this afternoon regarding patient's baseline  Daughter, Deja Mary, is patient's healthcare proxy  Daughter requested that we have proper conversation before we go into the room and talked to the patient  Patient and family have had a conversation regarding advanced care planning at the [address] [place] [address] before but at that time, patient was not ready to deescalate his level of care  Patient's daughter states that he does not want to live in a nursing facility and he thinks he will get back to doing his activities of daily living  After this conversation, we went to the room to have a proper conversation with the patient  We explained to the patient that this will be patient's baseline and that he is most likely not going to get better even with the infusion treatments  We asked patient what his wishes are in terms of resuscitation and intubation  After an extensive conversation regarding what resuscitation and intubation mean, patient was agreeable to becoming level 3 - DNR and DNI  Patient acknowledged that he needs higher level of care and and he reiterated that going to the nursing home is the last point in his life and that is equivalent to the end for him  For now, he is agreeable to looking into different options for higher level of care      Meeting with: Dr Slime Hernandez, Dr Luan Chong, Za Garrison RN, Justus Closs (Daughter)

## 2022-10-06 NOTE — H&P
Amrit Rawls 20 1945, 65 y o  male MRN: [id number]  Unit/Bed#: W -01 Encounter: [id number]  Primary Care Provider: Mayco Garvin MD   Date and time admitted to hospital: 10/5/2022 12:04 PM    Acute on chronic respiratory failure [facility])  Assessment & Plan  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  · Chest x-ray shows background emphysema, no acute pathology noted  · This is likely due to deconditioning from the respiratory fatigue    Plan:  · Will resume home inhalers and home medications  · Pulmonary consult  · Likely palliative consult    Alpha-1-antitrypsin deficiency [facility])  Assessment & Plan  · Patient has a history of alpha-1 antitrypsin deficiency  · On home [place] infusion per pulmonology  · Follows with Pulmonary as outpatient  · See plan under acute on chronic respiratory failure    Goals of care, counseling/discussion  Assessment & Plan  · Palliative Care saw the patient on September 12 and a completed 5 wishes document with the patient where he named his daughter Lyman Cranker as healthcare agent  · Patient states today that he wants level 1 full code    Hyponatremia  Assessment & Plan  · Patient has hyponatremia on admission  · Sodium 133    Plan:  Gentle hydration  A m   Labs    Parkinson disease [facility])  Assessment & Plan  Patient with history of Parkinson's, on Sinemet    Plan:  Continue Sinemet    Chronic pulmonary embolism ([facility] [address] )  Assessment & Plan  · Per chart review, patient completed a 6 month course of Eliquis for a PE per Oncology in 2018  If he develops another acute PE then will need lifelong anticoagulation  · CT chest September 7th shows chronic left-sided PE  · YOBANY on September 12 negative for Witt ovale, 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  EF 70%      VTE Pharmacologic Prophylaxis: VTE Score: 9 High Risk (Score >/= 5) - Pharmacological DVT Prophylaxis Ordered: enoxaparin (Lovenox)  Sequential Compression Devices Ordered  Code Status: Level 1 - Full Code patient confirmed today he wants level 1 full code  Discussion with family: Patient declined call to   Anticipated Length of Stay: Patient will be admitted on an inpatient basis with an anticipated length of stay of greater than 2 midnights secondary to Shortness of breath  Chief Complaint:  Shortness of breath    History of Present Illness:  Cash Sloan is a 65 y o  male with a PMH of alpha-1 antitrypsin deficiency, COPD, CAD, basal cell carcinoma of the neck and face, CKD stage 2, Parkinson's, BPH, PE status post completion of 6 months of Eliquis who presents with 2 day history of worsening shortness of breath, generalized weakness, lightheadedness, nausea, stomach upset  He also endorses a dry cough that has been worsening for the past 2 days  He states that his cough has been going on for months but not as bad  Patient describes shortness of breath worsening on ambulation or exertion  He does have shortness of breath at rest as well  Patient describes pressure in his epigastric and midsternal area that feels more like an upset, it does not radiate anywhere, 2/10 intensity, but it is worse when he is getting up from his bed  He denies fever, chills, dizziness, vomiting      Patient had a prior hospitalization at [facility] in  September for COPD exacerbation  During the hospitalization he was seen by palliative, where he filled out his 5 wishes document and named his daughter Daniela Arroyo his medical decision maker  He was managed for his COPD exacerbation  Pulmonology performed sniff testing that showed normal function of his diaphragm and vital capacity testing showed normal function  They discussed importance of pursuing rehab after discharge because his symptoms were due to physical deconditioning in addition to the progressive lung disease  Review of Systems:  Review of Systems   Constitutional: Positive for fatigue  Negative for appetite change, chills, diaphoresis and fever  Eyes: Negative for visual disturbance  Respiratory: Positive for cough, chest tightness and shortness of breath  Cardiovascular: Positive for chest pain  Negative for palpitations and leg swelling  Gastrointestinal: Positive for abdominal pain and nausea  Negative for constipation, diarrhea and vomiting  Musculoskeletal: Negative for back pain and gait problem  Neurological: Negative for dizziness, seizures, syncope, light-headedness and headaches  Psychiatric/Behavioral: The patient is not nervous/anxious          Past Medical and Surgical History:   Past Medical History:   Diagnosis Date   • Anesthesia complication     Difficult to wake up   • Arthritis    • BPH (benign prostatic hyperplasia)     urinary frequency   • Cancer (HCC)     basal cell neck, face   • CKD (chronic kidney disease) stage 2, GFR 60-89 ml/min 07/04/2022   • COPD (chronic obstructive pulmonary disease) ([facility])    • COPD exacerbation ([facility]) 12/29/2019   • Coronary artery disease    • Elevated PSA 10/25/2018   • Full dentures    • Hiatal hernia    • History of methicillin resistant staphylococcus aureus (MRSA)     10/11/2018 MRSA (nares) positive   • Hypertension    • Irritable bowel syndrome    • Kidney stone     at least 7 episodes   • Liver disease     Alpha 1- enzyme deficiency - diagnosed 2002  has been on weekly replacement therapy since then   • Parkinson disease ([facility] [address] )    • Pulmonary emphysema ([facility] [address] )     1/25/15  FEV1 - 2 45 liters or 59% of predicted   • RSV infection 12/2017   • SIRS (systemic inflammatory response syndrome) ([facility] [address] ) 8/14/2022   • Wears glasses     for driving only       Past Surgical History:   Procedure Laterality Date   • BACK SURGERY  2008    discectomy   • CARDIAC CATHETERIZATION N/A 5/3/2022    Procedure: Cardiac catheterization both left and right heart catheterization with vaso dilatory trial;  Surgeon: Nelsy Garcia MD;  Location: [place] [address] CATH LAB; Service: Cardiology   • CARDIAC CATHETERIZATION Left 5/3/2022    Procedure: Cardiac Left Heart Cath;  Surgeon: Nelsy Garcia MD;  Location: [place] [address] CATH LAB; Service: Cardiology   • CARDIAC CATHETERIZATION Left 5/3/2022    Procedure: Cardiac Left Ventriculogram;  Surgeon: Nelsy Garcia MD;  Location: [place] [address] CATH LAB; Service: Cardiology   • COLONOSCOPY     • COLONOSCOPY N/A 3/10/2017    Procedure: [place];  Surgeon: Tatiana Fields MD;  Location: [facility] GI LAB; Service:    • CYSTOSCOPY      removal of kidney stones   • ESOPHAGOGASTRODUODENOSCOPY N/A 3/10/2017    Procedure: ESOPHAGOGASTRODUODENOSCOPY (EGD); Surgeon: Tatiana Fields MD;  Location: [facility] GI LAB; Service:    • LITHOTRIPSY     • NM ESOPHAGOGASTRODUODENOSCOPY TRANSORAL DIAGNOSTIC N/A 11/20/2018    Procedure: ESOPHAGOGASTRODUODENOSCOPY (EGD); Surgeon: Tatiana Fields MD;  Location: [facility] GI LAB; Service: Gastroenterology   • TONSILLECTOMY     • VEIN LIGATION AND STRIPPING Bilateral     1980's       Meds/Allergies:  Prior to Admission medications    Medication Sig Start Date End Date Taking?  Authorizing Provider   amLODIPine (NORVASC) 10 mg tablet TAKE ONE TABLET BY MOUTH DAILY  11/5/21  Yes Harvey Reese MD   busPIRone (BUSPAR) 10 mg tablet TAKE 1 TABLET BY MOUTH 3 TIMES DAILY 7/5/22  Yes Nathanael Evans MD   carbidopa-levodopa (SINEMET)  mg per tablet take 1 tablet by mouth prior to each meal 7/5/22  Yes Ladarius Siddiqui MD   chlorthalidone 25 mg tablet Take 1 tablet (25 mg total) by mouth daily 7/15/22 10/13/22 Yes Zoë Reeder MD   cholestyramine Genetlila Sagastumemitz) 4 g packet Take 1 packet (4 g total) by mouth in the morning  5/11/22  Yes TOPHER Mosley   finasteride (PROSCAR) 5 mg tablet TAKE ONE TABLET BY MOUTH IN THE MORNING  10/21/21  Yes Galina Pappas MD   fluticasone Ahmed Nacho) 50 mcg/act nasal spray 2 sprays into each nostril daily 7/6/20  Yes Landon Ordonez MD   midodrine (PROAMATINE) 10 MG tablet Take 1 tab three times daily AS NEEDED if SBP less than 100   Please hold medication if blood pressure is above 331 systolic  9/46/89  Yes TOPHER Mosley   mirtazapine (REMERON) 15 mg tablet Take 2 tablets (30 mg total) by mouth daily at bedtime 5/23/22  Yes Jorge Wilkinson DO   pantoprazole (PROTONIX) 40 mg tablet TAKE ONE TABLET BY MOUTH TWICE DAILY 6/1/22  Yes Neil Phillips MD   tamsulosin (FLOMAX) 0 4 mg TAKE ONE CAPSULE BY MOUTH ONE TIME DAILY 5/12/22  Yes Neil Phillips MD   traZODone (DESYREL) 100 mg tablet Take 1 tablet (100 mg total) by mouth daily at bedtime 9/12/22  Yes Kelly Hogue DO   Ventolin  (90 Base) MCG/ACT inhaler Inhale 2 puffs every 4 (four) hours as needed for wheezing 11/8/21  Yes Janice Felty, PA-C   budesonide (PULMICORT) 0 5 mg/2 mL nebulizer solution Take 2 mL (0 5 mg total) by nebulization in the morning and 2 mL (0 5 mg total) in the evening  5/11/22 8/9/22  TOPHER Mosley   gabapentin (NEURONTIN) 300 mg capsule TAKE 1 CAPSULE BY MOUTH 3 TIMES DAILY 9/2/22   Neil Phillips MD   ipratropium (ATROVENT) 0 02 % nebulizer solution Take 2 5 mL (0 5 mg total) by nebulization 3 (three) times a day 8/18/22 9/17/22  Tanya Munguia MD   roflumilast (DALIRESP) 250 MCG tablet Take 1 tablet (250 mcg total) by mouth daily for 28 days 8/17/22 9/14/22  Marii Dalton MD     I have reviewed home medications with patient personally  Allergies: Allergies   Allergen Reactions   • Chantix [Varenicline]      Caused prostate infection       Social History:  Marital Status:    Occupation:  Retired  Patient Pre-hospital Living Situation: Home  Patient Pre-hospital Level of Mobility: walks with cane  Patient Pre-hospital Diet Restrictions:  Unknown  Substance Use History:   Social History     Substance and Sexual Activity   Alcohol Use Never    Comment: stopped in      Social History     Tobacco Use   Smoking Status Former Smoker   • Packs/day: 1 00   • Years: 60 00   • Pack years: 60 00   • Quit date: 2017   • Years since quittin 0   Smokeless Tobacco Never Used   Tobacco Comment    quit in 2017     Social History     Substance and Sexual Activity   Drug Use Not Currently       Family History:  Family History   Problem Relation Age of Onset   • Emphysema Mother         never smoked   • Emphysema Father    • Cancer Brother         colon   • Colon cancer Brother    • Ulcerative colitis Family    • Liver disease Family        Physical Exam:     Vitals:   Blood Pressure: 140/74 (10/05/22 2104)  Pulse: 74 (10/05/22 2104)  Temperature: 97 8 °F (36 6 °C) (10/05/22 2104)  Temp Source: Oral (10/05/22 2104)  Respirations: 19 (10/05/22 2104)  SpO2: 95 % (10/05/22 2104)    Physical Exam  Constitutional:       General: He is in acute distress (Mild respiratory distress)  Appearance: Normal appearance  He is normal weight  He is ill-appearing  He is not diaphoretic  HENT:      Head: Normocephalic and atraumatic  Mouth/Throat:      Mouth: Mucous membranes are moist    Eyes:      General: No scleral icterus  Extraocular Movements: Extraocular movements intact  Conjunctiva/sclera: Conjunctivae normal       Pupils: Pupils are equal, round, and reactive to light  Cardiovascular:      Rate and Rhythm: Normal rate and regular rhythm  Pulses: Normal pulses  Heart sounds: Normal heart sounds  No murmur heard  No friction rub  No gallop  Pulmonary:      Effort: Respiratory distress (2 L nasal cannula) present  Breath sounds: Rales (Bilaterally) present  No wheezing  Abdominal:      General: Abdomen is flat  Bowel sounds are normal  There is no distension  Palpations: Abdomen is soft  Tenderness: There is no abdominal tenderness  There is no guarding  Comments: Hard abdomen, nontender   Musculoskeletal:      Right lower leg: No edema  Left lower leg: No edema  Skin:     General: Skin is warm  Capillary Refill: Capillary refill takes less than 2 seconds  Findings: Bruising (Ecchymotic bruising on both arms) present  Neurological:      Mental Status: He is alert and oriented to person, place, and time  Psychiatric:         Mood and Affect: Mood normal          Behavior: Behavior normal         Additional Data:     Lab Results:  Results from last 7 days   Lab Units 10/05/22  1252   WBC Thousand/uL 9 33   HEMOGLOBIN g/dL 12 2   HEMATOCRIT % 34 3*   PLATELETS Thousands/uL 113*   BANDS PCT % 2   LYMPHO PCT % 18   MONO PCT % 4   EOS PCT % 0     Results from last 7 days   Lab Units 10/05/22  1252   SODIUM mmol/L 133*   POTASSIUM mmol/L 3 2*   CHLORIDE mmol/L 94*   CO2 mmol/L 31   BUN mg/dL 29*   CREATININE mg/dL 0 80   ANION GAP mmol/L 8   CALCIUM mg/dL 8 7   ALBUMIN g/dL 3 3*   TOTAL BILIRUBIN mg/dL 0 95   ALK PHOS U/L 53   ALT U/L 22   AST U/L 31   GLUCOSE RANDOM mg/dL 93                       Imaging: Reviewed radiology reports from this admission including: chest xray  XR chest 1 view portable   Final Result by Victorino Jamison MD (10/05 6873)      No acute cardiopulmonary disease  Background emphysema and scarring  Workstation performed: [id number]             EKG and Other Studies Reviewed on Admission:   · EKG: NSR  HR Eighty-three      ** Please Note: This note has been constructed using a voice recognition system   **

## 2022-10-06 NOTE — CONSULTS
Consultation - Pulmonary Medicine   Kim Del Angel 65 y o  male MRN: [id number]  Unit/Bed#: W -01 Encounter: [id number]      Assessment/Plan:    1  Acute pulmonary insufficiency on chronic hypoxic respiratory failure       -  currently on home O2 requirement of 3 L-97%       -  maintain saturations greater than 88%       -  pulmonary toilet:  Deep breathing cough, OOB as tolerated, IS Q 1 hr    2  Severe COPD without acute exacerbation       -  Inpatient:  Budesonide/Perforomist b i d , Xopenex/Atrovent t i d        -  will discontinue IV steroids at this as he does not seem to benefit       -  will start prednisone 10 mg daily       -  home regimen: Budesonide/Perforomist b i d , Ventolin 2 puffs Q 4 p r n , Xopenex/Atrovent t i d , Daliresp 500 mg daily       -  palliative following- will need goals of care discussion        3  Alpha-1 antitrypsin deficiency       -  unfortunately patient has missed almost 2 months of alpha-1 proteinase inhibitor       -  discussed with Internal Medicine the possibility of obtaining that for inpatient services    4  H/O PE       -  2018- chronic PE extending from left main into left interlobular PA       -  if repeat PE will need lifelong anticoagulation    5  Chronic grade 1 diastolic CHF w/ moderate PHTN likely WHO group II & III       -  9/12/2022-  Echo    EF 70%     proBNP unremarkable    PA pressure 43 mmHg       -  overall patient does not appear significantly overloaded       -  recommend keeping patient on drier side          History of Present Illness   Physician Requesting Consult: Anni Carrasco MD  Reason for Consult / Principal Problem:  Shortness of Breath  Hx and PE limited by:  Nothing  Chief Complaint: "I feel very short of breath"  HPI: Kim Del Angel is a 65 y o   male who presented to [address] [place] [address] with complaints of generalized weakness  Patient's has history of COPD secondary to alpha-1, shortness of breath, CKD, and Parkinson's    Patient presented from [facility] secondary to generalized weakness, shortness of breath, and lightheadedness for the past few days  He reports he is also having symptoms of nausea stomach upset and a dry cough  Upon ED admission patient was administered 125 mg IV Solu-Medrol, magnesium, and potassium  Pulmonary was consulted for shortness of breath  Upon examination patient continues to report dyspnea upon exertion  He continues to report no improvement whatsoever with a significant amount of administered steroids  Unfortunately I do feel that this is patient's new respiratory baseline  Recommended family discussion to decide patient treatment plan moving for  Patient currently denying any fevers, chills, hemoptysis, headaches, night sweats, pleuritic chest pain, or palpitations  From pulmonary standpoint, patient follows with Dr Nelly Andersen for his likely now very severe COPD  Patient reports an approximate 1 pack per day 60 year smoking history  Patient reports he quit smoking 5 years ago  Patient is currently maintained on  Budesonide/Perforomist b i d , Ventolin 2 puffs Q 4 p r n , Xopenex/Atrovent t i d , Daliresp 500 mg daily  Patient maintained on 3 L continuous nasal cannula  Patient denies any occupational exposures as he worked in [employer]  Patient denies having any pets  Patient reports history of postnasal drip in which she is maintained on Flonase  Patient reports history of GERD in which he is maintained on Protonix  Patient denies any history of seasonal allergies, KRYSTAL, or dysphagia  Patient currently denies any acute exposures to dust, mold, asbestos, or silica  Consults    Review of Systems   Constitutional: Negative for activity change, chills and fever  HENT: Negative for ear pain and sore throat  Eyes: Negative for pain and visual disturbance  Respiratory: Positive for cough and shortness of breath   Negative for apnea, choking, chest tightness, wheezing and stridor  Cardiovascular: Negative for chest pain and palpitations  Gastrointestinal: Negative for abdominal pain and vomiting  Genitourinary: Negative for dysuria and hematuria  Musculoskeletal: Negative for arthralgias and back pain  Skin: Negative for color change and rash  Neurological: Negative for dizziness, seizures and syncope  Psychiatric/Behavioral: Negative for agitation and behavioral problems  All other systems reviewed and are negative  Historical Information   Past Medical History:   Diagnosis Date   • Anesthesia complication     Difficult to wake up   • Arthritis    • BPH (benign prostatic hyperplasia)     urinary frequency   • Cancer (HCC)     basal cell neck, face   • CKD (chronic kidney disease) stage 2, GFR 60-89 ml/min 07/04/2022   • COPD (chronic obstructive pulmonary disease) ([facility])    • COPD exacerbation ([facility]) 12/29/2019   • Coronary artery disease    • Elevated PSA 10/25/2018   • Full dentures    • Hiatal hernia    • History of methicillin resistant staphylococcus aureus (MRSA)     10/11/2018 MRSA (nares) positive   • Hypertension    • Irritable bowel syndrome    • Kidney stone     at least 7 episodes   • Liver disease     Alpha 1- enzyme deficiency - diagnosed 2002  has been on weekly replacement therapy since then   • Parkinson disease ([facility] [address] )    • Pulmonary emphysema ([facility] [address] )     1/25/15  FEV1 - 2 45 liters or 59% of predicted   • RSV infection 12/2017   • SIRS (systemic inflammatory response syndrome) ([facility] [address] ) 8/14/2022   • Wears glasses     for driving only     Past Surgical History:   Procedure Laterality Date   • BACK SURGERY  2008    discectomy   • CARDIAC CATHETERIZATION N/A 5/3/2022    Procedure: Cardiac catheterization both left and right heart catheterization with vaso dilatory trial;  Surgeon: Von Judd MD;  Location: [place] [address] CATH LAB;   Service: Cardiology   • CARDIAC CATHETERIZATION Left 5/3/2022    Procedure: Cardiac Left Heart Cath;  Surgeon: Von Judd MD;  Location: [place] [address] CATH LAB; Service: Cardiology   • CARDIAC CATHETERIZATION Left 5/3/2022    Procedure: Cardiac Left Ventriculogram;  Surgeon: Dalia Merritt MD;  Location: [place] [address] CATH LAB; Service: Cardiology   • COLONOSCOPY     • COLONOSCOPY N/A 3/10/2017    Procedure: Denny Lara;  Surgeon: Jerilyn Rose MD;  Location: [facility] GI LAB; Service:    • CYSTOSCOPY      removal of kidney stones   • ESOPHAGOGASTRODUODENOSCOPY N/A 3/10/2017    Procedure: ESOPHAGOGASTRODUODENOSCOPY (EGD); Surgeon: Jerilyn Rose MD;  Location: [facility] GI LAB; Service:    • LITHOTRIPSY     • NE ESOPHAGOGASTRODUODENOSCOPY TRANSORAL DIAGNOSTIC N/A 2018    Procedure: ESOPHAGOGASTRODUODENOSCOPY (EGD); Surgeon: Jerilyn Rose MD;  Location: [facility] GI LAB;   Service: Gastroenterology   • TONSILLECTOMY     • VEIN LIGATION AND STRIPPING Bilateral          Social History   Social History     Substance and Sexual Activity   Alcohol Use Never    Comment: stopped in      Social History     Substance and Sexual Activity   Drug Use Not Currently     Social History     Tobacco Use   Smoking Status Former Smoker   • Packs/day: 1 00   • Years: 60 00   • Pack years: 60 00   • Quit date: 2017   • Years since quittin 1   Smokeless Tobacco Never Used   Tobacco Comment    quit in 2017     E-Cigarette/Vaping   • E-Cigarette Use Never User      E-Cigarette/Vaping Substances   • Nicotine No    • THC No    • CBD No    • Flavoring No    • Other No    • Unknown No      Occupational History:  Noncontributory    Family History:   Family History   Problem Relation Age of Onset   • Emphysema Mother         never smoked   • Emphysema Father    • Cancer Brother         colon   • Colon cancer Brother    • Ulcerative colitis Family    • Liver disease Family        Meds/Allergies   pertinent pulmonary meds have been reviewed    Allergies   Allergen Reactions   • Chantix [Varenicline]      Caused prostate infection Objective   Vitals: Blood pressure 144/76, pulse 74, temperature 97 7 °F (36 5 °C), resp  rate 17, SpO2 97 %  RA,There is no height or weight on file to calculate BMI  Intake/Output Summary (Last 24 hours) at 10/6/2022 1424  Last data filed at 10/6/2022 1255  Gross per 24 hour   Intake 600 ml   Output 1000 ml   Net -400 ml     Invasive Devices  Report    Peripheral Intravenous Line  Duration           Peripheral IV 10/06/22 Dorsal (posterior); Right Forearm <1 day                Physical Exam  Constitutional:       General: He is not in acute distress  Appearance: Normal appearance  He is normal weight  He is not ill-appearing  HENT:      Head: Normocephalic and atraumatic  Nose: Nose normal  No congestion or rhinorrhea  Mouth/Throat:      Mouth: Mucous membranes are dry  Pharynx: No oropharyngeal exudate or posterior oropharyngeal erythema  Cardiovascular:      Rate and Rhythm: Normal rate and regular rhythm  Pulses: Normal pulses  Heart sounds: Normal heart sounds  No murmur heard  No friction rub  No gallop  Pulmonary:      Effort: Pulmonary effort is normal  No tachypnea, bradypnea, accessory muscle usage or respiratory distress  Breath sounds: Decreased air movement present  No stridor  Decreased breath sounds present  No wheezing, rhonchi or rales  Comments: Significantly diminished aeration  Chest:      Chest wall: No tenderness  Abdominal:      General: Abdomen is flat  Bowel sounds are normal  There is no distension  Palpations: Abdomen is soft  There is no mass  Musculoskeletal:         General: No swelling or tenderness  Normal range of motion  Cervical back: Normal range of motion  No rigidity or tenderness  Skin:     General: Skin is warm and dry  Coloration: Skin is not jaundiced or pale  Neurological:      General: No focal deficit present  Mental Status: He is alert and oriented to person, place, and time   Mental status is at baseline  Psychiatric:         Mood and Affect: Mood normal          Behavior: Behavior normal          Lab Results:   I have personally reviewed pertinent lab results CBC:   Lab Results   Component Value Date    WBC 11 35 (H) 10/06/2022    HGB 12 6 10/06/2022    HCT 35 3 (L) 10/06/2022    MCV 94 10/06/2022     (L) 10/06/2022    MCH 33 6 10/06/2022    MCHC 35 7 10/06/2022    RDW 14 7 10/06/2022    MPV 10 0 10/06/2022   , CMP:   Lab Results   Component Value Date    SODIUM 135 10/06/2022    K 4 6 10/06/2022    CL 97 10/06/2022    CO2 29 10/06/2022    BUN 26 (H) 10/06/2022    CREATININE 0 82 10/06/2022    CALCIUM 8 9 10/06/2022    EGFR 84 10/06/2022     Imaging Studies: I have personally reviewed pertinent films in PACS     Chest x-ray- 10/5/2022- no acute cardiopulmonary disease    EKG, Pathology, and Other Studies: I have personally reviewed pertinent films in PACS     Echo- 2/39/1791- grade 1 diastolic dysfunction EF 50% PA pressure 43 mmHg    Pulmonary Results (PFTs, PSG): I have personally reviewed pertinent films in PACS     Results: 1/28/2020  FEV1/FVC Ratio: 45 %  Forced Vital Capacity: 3 56 L    65 % predicted  FEV1: 1 59 L     40 % predicted     Interpretation:     · Moderate obstructive airflow defect      · There is significant airway response with the administration of bronchodilator per ATS standards     · Flow volume loop is consistent with obstruction      VTE Prophylaxis: Sequential compression device (Venodyne)     Code Status: Level 1 - Full Code    None    Portions of the record may have been created with voice recognition software  Occasional wrong word or "sound a like" substitutions may have occurred due to the inherent limitations of voice recognition software  Read the chart carefully and recognize, using context, where substitutions have occurred

## 2022-10-06 NOTE — ASSESSMENT & PLAN NOTE
· Patient has a history of alpha-1 antitrypsin deficiency  · On home Talamantes infusion per pulmonology  · Follows with Pulmonary as outpatient  · See plan under acute on chronic respiratory failure  · Consider steroids based on pulm recs  · Reorder labs

## 2022-10-06 NOTE — ASSESSMENT & PLAN NOTE
· Patient has a history of alpha-1 antitrypsin deficiency  · On home Talamantes infusion per pulmonology  · Follows with Pulmonary as outpatient  · See plan under acute on chronic respiratory failure

## 2022-10-06 NOTE — PROGRESS NOTES
[facility]  Progress Note - Mila Baumgarten 1945, 65 y o  male MRN: [id number]  Unit/Bed#: W -01 Encounter: [id number]  Primary Care Provider: Rancho Alberto MD   Date and time admitted to hospital: 10/5/2022 12:04 PM    Alpha-1-antitrypsin deficiency [facility])  Assessment & Plan  · Patient has a history of alpha-1 antitrypsin deficiency  · On home Talamantes infusion per pulmonology  · Follows with Pulmonary as outpatient  · See plan under acute on chronic respiratory failure  · Consider steroids based on pulm recs  · Reorder labs    * Acute on chronic respiratory failure ([facility] [address] )  Assessment & Plan  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  · Chest x-ray shows background emphysema, no acute pathology noted  · This is likely due to deconditioning from the respiratory fatigue  · Sats trending high 90s on 2 5L/min O2    Plan:  · Will resume home inhalers and home medications  · Pulmonary consult  · Likely palliative consult    Hyponatremia  Assessment & Plan  · Patient has hyponatremia on admission  · Sodium 133    Plan:  - Gentle hydration  - A m   Labs    Goals of care, counseling/discussion  Assessment & Plan  · Palliative Care saw the patient on September 12 and a completed 5 wishes document with the patient where he named his daughter Jayden Li as healthcare agent  · Patient states today that he wants level 1 full code  · Will discuss code status w/ patient when respiration improves    Parkinson disease [facility])  Assessment & Plan  Patient with history of Parkinson's, on Sinemet    Plan:  - Continue Sinemet    Chronic pulmonary embolism ([address] )  Assessment & Plan  · Per chart review, patient completed a 6 month course of Eliquis for a PE per Oncology in 2018  If he develops another acute PE then will need lifelong anticoagulation  · CT chest  shows chronic left-sided PE  · YOBANY on  negative for Witt ovale, 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  EF 70%        VTE Pharmacologic Prophylaxis: VTE Score: 9 High Risk (Score >/= 5) - Pharmacological DVT Prophylaxis Ordered: enoxaparin (Lovenox)  Sequential Compression Devices Ordered  Patient Centered Rounds: I performed bedside rounds with nursing staff today  Discussions with Specialists or Other Care Team Provider: None    Education and Discussions with Family / Patient: Updated  (daughter) via phone  Current Length of Stay: 0 day(s)  Current Patient Status: Observation   Discharge Plan: Anticipate discharge in 24-48 hrs to rehab facility  Code Status: Level 1 - Full Code    Subjective:   Patient seen and evaluated at bedside this AM  Patient reports significant resp distress  Patient states this episode is worse than the previous episode of hospitalization in September  There were no other acute overnight  Objective:     Vitals:   Temp (24hrs), Av 8 °F (36 6 °C), Min:97 7 °F (36 5 °C), Max:97 8 °F (36 6 °C)    Temp:  [97 7 °F (36 5 °C)-97 8 °F (36 6 °C)] 97 7 °F (36 5 °C)  HR:  [74-78] 74  Resp:  [16-19] 17  BP: (117-144)/(56-76) 144/76  SpO2:  [94 %-97 %] 97 %  There is no height or weight on file to calculate BMI  Input and Output Summary (last 24 hours):      Intake/Output Summary (Last 24 hours) at 10/6/2022 1516  Last data filed at 10/6/2022 1255  Gross per 24 hour   Intake 600 ml   Output 1000 ml   Net -400 ml       Physical Exam:   Physical Exam  Constitutional:       Appearance: He is normal weight  He is ill-appearing  He is not diaphoretic  HENT:      Head: Normocephalic and atraumatic  Mouth/Throat:      Mouth: Mucous membranes are moist       Pharynx: Oropharynx is clear  Eyes:      General: No scleral icterus  Right eye: No discharge  Left eye: No discharge  Conjunctiva/sclera: Conjunctivae normal    Cardiovascular:      Rate and Rhythm: Normal rate and regular rhythm  Pulses: Normal pulses  Heart sounds: Normal heart sounds  No murmur heard  No friction rub  No gallop  Pulmonary:      Effort: Accessory muscle usage and retractions present  Breath sounds: Wheezing present  Abdominal:      General: Abdomen is flat  Palpations: Abdomen is soft  Musculoskeletal:         General: Normal range of motion  Skin:     General: Skin is warm and dry  Comments: Bleeding from IV site on L antecubital area   Neurological:      General: No focal deficit present  Mental Status: He is alert and oriented to person, place, and time  Psychiatric:         Mood and Affect: Mood normal          Behavior: Behavior normal          Thought Content:  Thought content normal          Judgment: Judgment normal        Additional Data:     Labs:  Results from last 7 days   Lab Units 10/06/22  1111   WBC Thousand/uL 11 35*   HEMOGLOBIN g/dL 12 6   HEMATOCRIT % 35 3*   PLATELETS Thousands/uL 124*   BANDS PCT % 1   LYMPHO PCT % 9*   MONO PCT % 1*   EOS PCT % 0     Results from last 7 days   Lab Units 10/06/22  1111 10/05/22  1252   SODIUM mmol/L 135 133*   POTASSIUM mmol/L 4 6 3 2*   CHLORIDE mmol/L 97 94*   CO2 mmol/L 29 31   BUN mg/dL 26* 29*   CREATININE mg/dL 0 82 0 80   ANION GAP mmol/L 9 8   CALCIUM mg/dL 8 9 8 7   ALBUMIN g/dL  --  3 3*   TOTAL BILIRUBIN mg/dL  --  0 95   ALK PHOS U/L  --  53   ALT U/L  --  22   AST U/L  --  31   GLUCOSE RANDOM mg/dL 146* 93                       Lines/Drains:  Invasive Devices  Report    Peripheral Intravenous Line  Duration           Peripheral IV 10/06/22 Dorsal (posterior); Right Forearm <1 day                      Imaging: Reviewed radiology reports from this admission including: chest xray    Recent Cultures (last 7 days): None        Last 24 Hours Medication List:   Current Facility-Administered Medications   Medication Dose Route Frequency Provider Last Rate   • albuterol  2 puff Inhalation Q4H PRN Eze Post MD     • amLODIPine  10 mg Oral Daily zEe Post MD     • budesonide  0 5 mg Nebulization BID Racheal White MD     • busPIRone  10 mg Oral TID Eze Post MD     • carbidopa-levodopa  1 tablet Oral TID Eze Post MD     • chlorthalidone  25 mg Oral Daily Eze Post MD     • cholestyramine sugar free  4 g Oral HS Eze Post MD     • enoxaparin  40 mg Subcutaneous Daily Eze Post MD     • finasteride  5 mg Oral QAM Eze Post MD     • fluticasone  2 spray Nasal Daily Eze Post MD     • gabapentin  300 mg Oral TID Eze Post MD     • ipratropium  0 5 mg Nebulization TID Racheal White MD     • levalbuterol  1 25 mg Nebulization Q6H PRN Racheal White MD     • midodrine  10 mg Oral TID PRN Eze Post MD     • mirtazapine  30 mg Oral HS Eze Post MD     • pantoprazole  20 mg Oral BID AC Eze Post MD     • roflumilast  250 mcg Oral Daily Eze Post MD     • tamsulosin  0 4 mg Oral Daily Eze Post MD     • traZODone  100 mg Oral HS Eze Post MD          Today, Patient Was Seen By: Huong Hills DO    **Please Note: This note may have been constructed using a voice recognition system  **

## 2022-10-06 NOTE — ASSESSMENT & PLAN NOTE
· Palliative Care saw the patient on September 12 and a completed 5 wishes document with the patient where he named his daughter Sonny Traylor as healthcare agent  · Patient states today that he wants level 1 full code  · Will discuss code status w/ patient when respiration improves

## 2022-10-06 NOTE — ASSESSMENT & PLAN NOTE
· Patient with a history of centrilobular emphysema and alpha-1 antitrypsin deficiency  · Presenting with 2 days of worsening exertional shortness of breath associated with dry cough  He has also endorsing generalized weakness, lightheadedness, nausea, stomach upset    · Patient also endorses epigastric midsternal pressure, 2/10, nonradiating, worse when getting up from bed or exerting himself  · Patient on 3 L oxygen chronically at home  · Prior hospitalization in September:  Treated for COPD exacerbation, sniff test negative for diaphragm paralysis, delvis negative for PFO 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  · Chest x-ray shows background emphysema, no acute pathology noted  · This is likely due to deconditioning from the respiratory fatigue  · Sats trending high 90s on 2 5L/min O2    Plan:  · Will resume home inhalers and home medications  · Pulmonary consult  · Likely palliative consult

## 2022-10-06 NOTE — ASSESSMENT & PLAN NOTE
· Palliative Care saw the patient on September 12 and a completed 5 wishes document with the patient where he named his daughter Deja Mary as healthcare agent  · Patient states today that he wants level 1 full code

## 2022-10-07 LAB
ANION GAP SERPL CALCULATED.3IONS-SCNC: 5 MMOL/L (ref 4–13)
BASOPHILS # BLD MANUAL: 0 THOUSAND/UL (ref 0–0.1)
BASOPHILS NFR MAR MANUAL: 0 % (ref 0–1)
BUN SERPL-MCNC: 23 MG/DL (ref 5–25)
CALCIUM SERPL-MCNC: 9.3 MG/DL (ref 8.4–10.2)
CHLORIDE SERPL-SCNC: 99 MMOL/L (ref 96–108)
CO2 SERPL-SCNC: 34 MMOL/L (ref 21–32)
CREAT SERPL-MCNC: 0.77 MG/DL (ref 0.6–1.3)
EOSINOPHIL # BLD MANUAL: 0 THOUSAND/UL (ref 0–0.4)
EOSINOPHIL NFR BLD MANUAL: 0 % (ref 0–6)
ERYTHROCYTE [DISTWIDTH] IN BLOOD BY AUTOMATED COUNT: 14.8 % (ref 11.6–15.1)
GFR SERPL CREATININE-BSD FRML MDRD: 86 ML/MIN/1.73SQ M
GLUCOSE SERPL-MCNC: 107 MG/DL (ref 65–140)
HCT VFR BLD AUTO: 36.6 % (ref 36.5–49.3)
HGB BLD-MCNC: 12.7 G/DL (ref 12–17)
LYMPHOCYTES # BLD AUTO: 1.59 THOUSAND/UL (ref 0.6–4.47)
LYMPHOCYTES # BLD AUTO: 16 % (ref 14–44)
MCH RBC QN AUTO: 33.6 PG (ref 26.8–34.3)
MCHC RBC AUTO-ENTMCNC: 34.7 G/DL (ref 31.4–37.4)
MCV RBC AUTO: 97 FL (ref 82–98)
MONOCYTES # BLD AUTO: 0.7 THOUSAND/UL (ref 0–1.22)
MONOCYTES NFR BLD: 7 % (ref 4–12)
MYELOCYTES NFR BLD MANUAL: 1 % (ref 0–1)
NEUTROPHILS # BLD MANUAL: 7.55 THOUSAND/UL (ref 1.85–7.62)
NEUTS BAND NFR BLD MANUAL: 1 % (ref 0–8)
NEUTS SEG NFR BLD AUTO: 75 % (ref 43–75)
NRBC BLD AUTO-RTO: 1 /100 WBC (ref 0–2)
PLATELET # BLD AUTO: 117 THOUSANDS/UL (ref 149–390)
PLATELET BLD QL SMEAR: ABNORMAL
PMV BLD AUTO: 9.4 FL (ref 8.9–12.7)
POTASSIUM SERPL-SCNC: 4 MMOL/L (ref 3.5–5.3)
RBC # BLD AUTO: 3.78 MILLION/UL (ref 3.88–5.62)
RBC MORPH BLD: NORMAL
SODIUM SERPL-SCNC: 138 MMOL/L (ref 135–147)
WBC # BLD AUTO: 9.93 THOUSAND/UL (ref 4.31–10.16)

## 2022-10-07 PROCEDURE — 94760 N-INVAS EAR/PLS OXIMETRY 1: CPT

## 2022-10-07 PROCEDURE — 85027 COMPLETE CBC AUTOMATED: CPT

## 2022-10-07 PROCEDURE — 99232 SBSQ HOSP IP/OBS MODERATE 35: CPT | Performed by: INTERNAL MEDICINE

## 2022-10-07 PROCEDURE — 94640 AIRWAY INHALATION TREATMENT: CPT

## 2022-10-07 PROCEDURE — 99232 SBSQ HOSP IP/OBS MODERATE 35: CPT | Performed by: HOSPITALIST

## 2022-10-07 PROCEDURE — 85007 BL SMEAR W/DIFF WBC COUNT: CPT

## 2022-10-07 PROCEDURE — 99223 1ST HOSP IP/OBS HIGH 75: CPT | Performed by: NURSE PRACTITIONER

## 2022-10-07 PROCEDURE — 80048 BASIC METABOLIC PNL TOTAL CA: CPT

## 2022-10-07 RX ORDER — FUROSEMIDE 10 MG/ML
20 INJECTION INTRAMUSCULAR; INTRAVENOUS ONCE
Status: COMPLETED | OUTPATIENT
Start: 2022-10-07 | End: 2022-10-07

## 2022-10-07 RX ADMIN — ALBUTEROL SULFATE 2 PUFF: 90 AEROSOL, METERED RESPIRATORY (INHALATION) at 11:44

## 2022-10-07 RX ADMIN — GABAPENTIN 300 MG: 300 CAPSULE ORAL at 16:59

## 2022-10-07 RX ADMIN — LEVALBUTEROL HYDROCHLORIDE 1.25 MG: 1.25 SOLUTION, CONCENTRATE RESPIRATORY (INHALATION) at 07:21

## 2022-10-07 RX ADMIN — MIRTAZAPINE 30 MG: 15 TABLET, FILM COATED ORAL at 21:27

## 2022-10-07 RX ADMIN — FUROSEMIDE 20 MG: 10 INJECTION, SOLUTION INTRAMUSCULAR; INTRAVENOUS at 11:45

## 2022-10-07 RX ADMIN — BUSPIRONE HYDROCHLORIDE 10 MG: 5 TABLET ORAL at 16:59

## 2022-10-07 RX ADMIN — GABAPENTIN 300 MG: 300 CAPSULE ORAL at 21:27

## 2022-10-07 RX ADMIN — PANTOPRAZOLE SODIUM 20 MG: 20 TABLET, DELAYED RELEASE ORAL at 05:46

## 2022-10-07 RX ADMIN — TAMSULOSIN HYDROCHLORIDE 0.4 MG: 0.4 CAPSULE ORAL at 09:24

## 2022-10-07 RX ADMIN — GABAPENTIN 300 MG: 300 CAPSULE ORAL at 09:24

## 2022-10-07 RX ADMIN — FLUTICASONE PROPIONATE 2 SPRAY: 50 SPRAY, METERED NASAL at 09:24

## 2022-10-07 RX ADMIN — AMLODIPINE BESYLATE 10 MG: 10 TABLET ORAL at 09:24

## 2022-10-07 RX ADMIN — PANTOPRAZOLE SODIUM 20 MG: 20 TABLET, DELAYED RELEASE ORAL at 16:59

## 2022-10-07 RX ADMIN — CARBIDOPA AND LEVODOPA 1 TABLET: 25; 100 TABLET ORAL at 09:24

## 2022-10-07 RX ADMIN — CHLORTHALIDONE 25 MG: 25 TABLET ORAL at 09:24

## 2022-10-07 RX ADMIN — IPRATROPIUM BROMIDE 0.5 MG: 0.5 SOLUTION RESPIRATORY (INHALATION) at 20:28

## 2022-10-07 RX ADMIN — IPRATROPIUM BROMIDE 0.5 MG: 0.5 SOLUTION RESPIRATORY (INHALATION) at 07:21

## 2022-10-07 RX ADMIN — BUSPIRONE HYDROCHLORIDE 10 MG: 5 TABLET ORAL at 21:27

## 2022-10-07 RX ADMIN — CARBIDOPA AND LEVODOPA 1 TABLET: 25; 100 TABLET ORAL at 16:59

## 2022-10-07 RX ADMIN — BUDESONIDE 0.5 MG: 0.5 INHALANT ORAL at 20:28

## 2022-10-07 RX ADMIN — IPRATROPIUM BROMIDE 0.5 MG: 0.5 SOLUTION RESPIRATORY (INHALATION) at 13:14

## 2022-10-07 RX ADMIN — FINASTERIDE 5 MG: 5 TABLET, FILM COATED ORAL at 09:24

## 2022-10-07 RX ADMIN — CARBIDOPA AND LEVODOPA 1 TABLET: 25; 100 TABLET ORAL at 21:27

## 2022-10-07 RX ADMIN — BUDESONIDE 0.5 MG: 0.5 INHALANT ORAL at 07:21

## 2022-10-07 RX ADMIN — LEVALBUTEROL HYDROCHLORIDE 1.25 MG: 1.25 SOLUTION, CONCENTRATE RESPIRATORY (INHALATION) at 20:28

## 2022-10-07 RX ADMIN — TRAZODONE HYDROCHLORIDE 100 MG: 100 TABLET ORAL at 21:27

## 2022-10-07 RX ADMIN — BUSPIRONE HYDROCHLORIDE 10 MG: 5 TABLET ORAL at 09:24

## 2022-10-07 RX ADMIN — ENOXAPARIN SODIUM 40 MG: 40 INJECTION SUBCUTANEOUS at 09:23

## 2022-10-07 NOTE — CONSULTS
Consultation - Palliative & Supportive Care   Carejunior Marylou  65 y o   male  W /W -01   MRN: [id number]  Encounter: [id number]    ASSESSMENT:    Patient Active Problem List   Diagnosis   • Alpha-1-antitrypsin deficiency ([facility] )   • Gastroesophageal reflux disease   • Essential hypertension   • BPH (benign prostatic hyperplasia)   • Liver disease   • Former smoker   • Chronic pulmonary embolism (HCC)   • Acute on chronic respiratory failure (HCC)   • CLAYTON (acute kidney injury) ([facility] )   • Centrilobular emphysema (HCC)   • Chronic venous insufficiency   • 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 (HCC)   • Insomnia   • Ambulatory dysfunction   • Allergic rhinitis   • Benign colon polyp   • Cataracts, both eyes   • Chronic diarrhea    • Peripheral neuropathy   • COPD with acute exacerbation (HCC)   • Syncope   • Orthostatic hypotension with spine hypertension   • Vitamin D deficiency disease   • Avascular necrosis of hip, right (HCC)   • Depression, recurrent (HCC)   • Anxiety   • Parkinson disease ([facility] )   • Cognitive impairment   • Hypertensive urgency   • Chest heaviness   • H/O cardiac catheterization   • Panlobular emphysema (HCC)   • SIRS (systemic inflammatory response syndrome) (HCC)   • Lactic acidosis   • Goals of care, counseling/discussion   • Hyponatremia     Active issues specifically addressed today include: goals of care, severe emphysema, alpha-1-antitrypsin deficiency, dyspnea, chronic memory issues, chronic ambulatory dysfunction, debility, depression, anxiety, Parkinson's disease, chronic PE, pulmonary HTN, IBS, neuropathy, chronic hypoxic respiratory failure  77M w/ severe emphysema and chronic hypoxic respiratory failure on supplemental home O2, admitted from [facility] w/ worsening dyspnea and generalized weakness  [facility] consulted for goals of care    PLAN:    1  Goals:   • Disease focused care  • Continue Albertina Finders / DNAR + DNI    • Patient has 1 week of rehab remaining for his benefit  He wishes to complete this week of rehab prior to going back home to independent living  • Patient understands that he is in need of a home health aide if he is ever transitioned home  He reports him and his daughter are discussing resources and how the aid will be paid for  • Palliative will follow for ongoing goals of care discussions as situation evolves  2  Social Support:  • Supportive listening provided his ultimate goal is to be in his home and this is what is most important to him  Says "I will do what it takes to get there "  • Normalized experience of patient/family  • Provided anxiety containment    3  Symptom management:  · No palliative symptom management today  Will continue to monitor patient and treat accordingly  Code status: Level 3 - DNAR and DNI   Decisional apparatus:  Patient does have capacity to make medical decisions on my exam today  If such capacity is lost, patient's substitute decision maker would default to jason Gupta by [place] Act 169  Advance Directive / Living Will / POLST:  Five Wishes on file, completed in 09/2022, naming jason Gupta as surrogate healthcare decision-maker  Reviewed  I have reviewed the patient's controlled substance dispensing history in the Prescription Drug Monitoring Program in compliance with the [facility] regulations before prescribing any controlled substances  We appreciate the opportunity to participate in this patient's care  We will continue to follow  Please do not hesitate to contact our on-call provider through our clinic answering service at [phone] should you have acute symptom control concerns      IDENTIFICATION:  Inpatient consult to Palliative Care  Consult performed by: Mitch Bello 27 ordered by: Clemencia Jacob DO      Reason for Consult / Principal Problem: "COPD"    HISTORY OF PRESENT ILLNESS:    Elvira Moeller is a 65 y o  male with severe emphysema w/ alpha-1-antitrypsin deficiency, chronic hypoxic respiratory failure on supplemental home O2, pulmonary hypertension w/ chronic PE, Parkinson’s disease, former tobacco dependence (60 pack-years, quit 2017), OA, BPH, IBS, idiopathic neuropathy, h/o BCC of the face and neck, anxiousness, insomnia  He follows w/ Jarvis Núñez PA-C / Dr Pedro Cagle (Pulmonology), Dr Nicola Hernandez (Cardiology), Dr Rebecca Ham (Sleep Medicine)  He is well known to Palliative and follows with us in the ambulatory setting  Patient had been in [address] [place] [address] since his prior admission (9/3/22 for COPD exacerbation + fall  [facility] c/o progressive generalized weakness and dyspnea, wheezing, lightheadedness, presyncope, cough and was admitted for acute on chronic respiratory failure  Patient is seen today with no family present  He is lying in bed and easily woken  He is pleasant and engaged in conversation  Discussed expected disease trajectory and prognosis  Explained disease focused care versus comfort care and introduced hospice  Although he freely discusses these things as options, it is unclear whether he would ever actually consider these things for himself  He does make statements about wanting to stay home and be comfortable but also says he wants to return to the hospital for ongoing medical care  At minimum, patient does want to complete his time within his rehab benefit which has 7 days remaining  We discussed completing the week of rehab and then considering long-term care placement  Patient gives me permission to call his daughter Lyman Cranker and discuss these things with her  Spoke to Lyman Cranker via telephone  Informed her of the above conversation  Explained that I introduced the idea of hospice however I believe this will need to be an ongoing conversation as patient really does want to continue to return to the hospital for ongoing medical care  Lyman Cranker is very understanding of information   She is supportive of her dad and wants to assist him with whatever decision he makes  She does understand his medical conditions and prognosis but also wants to allow him to be independent in decision-making as much as she possibly can  Deja Mary will continue these conversations when she talks to him and will assist him with decision-making  She will reach out to palliative medicine if she has any ongoing questions  Interview and exam limited by: N/A    Review of Systems   Constitutional: Positive for appetite change and fatigue  Respiratory: Positive for cough and shortness of breath  Requesting his inhaler   Musculoskeletal: Positive for myalgias  Past Medical History:   Diagnosis Date   • Anesthesia complication     Difficult to wake up   • Arthritis    • BPH (benign prostatic hyperplasia)     urinary frequency   • Cancer (HCC)     basal cell neck, face   • CKD (chronic kidney disease) stage 2, GFR 60-89 ml/min 07/04/2022   • COPD (chronic obstructive pulmonary disease) ([facility]    • COPD exacerbation ([facility]) 12/29/2019   • Coronary artery disease    • Elevated PSA 10/25/2018   • Full dentures    • Hiatal hernia    • History of methicillin resistant staphylococcus aureus (MRSA)     10/11/2018 MRSA (nares) positive   • Hypertension    • Irritable bowel syndrome    • Kidney stone     at least 7 episodes   • Liver disease     Alpha 1- enzyme deficiency - diagnosed 2002  has been on weekly replacement therapy since then   • Parkinson disease ([facility] )    • Pulmonary emphysema ([facility] )     1/25/15  FEV1 - 2 45 liters or 59% of predicted   • RSV infection 12/2017   • SIRS (systemic inflammatory response syndrome) ([facility] ) 8/14/2022   • Wears glasses     for driving only     Past Surgical History:   Procedure Laterality Date   • BACK SURGERY  2008    discectomy   • CARDIAC CATHETERIZATION N/A 5/3/2022    Procedure: Cardiac catheterization both left and right heart catheterization with vaso dilatory trial;  Surgeon: Norberto Valdez MD;  Location: [place] [address] CATH LAB;   Service: Cardiology   • CARDIAC CATHETERIZATION Left 5/3/2022    Procedure: Cardiac Left Heart Cath;  Surgeon: Nuria Sheppard MD;  Location: [place] [address] CATH LAB; Service: Cardiology   • CARDIAC CATHETERIZATION Left 5/3/2022    Procedure: Cardiac Left Ventriculogram;  Surgeon: Nuria Sheppard MD;  Location: [place] [address] CATH LAB; Service: Cardiology   • COLONOSCOPY     • COLONOSCOPY N/A 3/10/2017    Procedure: Mar Cough;  Surgeon: Opal Diaz MD;  Location: [facility] GI LAB; Service:    • CYSTOSCOPY      removal of kidney stones   • ESOPHAGOGASTRODUODENOSCOPY N/A 3/10/2017    Procedure: ESOPHAGOGASTRODUODENOSCOPY (EGD); Surgeon: Opal Diaz MD;  Location: [facility] GI LAB; Service:    • LITHOTRIPSY     • [place] ESOPHAGOGASTRODUODENOSCOPY TRANSORAL DIAGNOSTIC N/A 2018    Procedure: ESOPHAGOGASTRODUODENOSCOPY (EGD); Surgeon: Opal Diaz MD;  Location: [facility] GI LAB; Service: Gastroenterology   • TONSILLECTOMY     • VEIN LIGATION AND STRIPPING Bilateral          Social History     Socioeconomic History   • Marital status:      Spouse name: Not on file   • Number of children: Not on file   • Years of education: Not on file   • Highest education level: Not on file   Occupational History   • Not on file   Tobacco Use   • Smoking status: Former Smoker     Packs/day: 1 00     Years: 60 00     Pack years: 60 00     Quit date: 2017     Years since quittin 1   • Smokeless tobacco: Never Used   • Tobacco comment: quit in 2017   Vaping Use   • Vaping Use: Never used   Substance and Sexual Activity   • Alcohol use: Never     Comment: stopped in    • Drug use: Not Currently   • Sexual activity: Not on file   Other Topics Concern   • Not on file   Social History Narrative    Patient is   He has three adult children; 1 daughter and 2 sons  His daughter Cas Frederick lives closest [place] and is very involved w/ his care  Patient is not Amish  He lives alone       Social Determinants of Health Financial Resource Strain: Not on file   Food Insecurity: No Food Insecurity   • Worried About [address] in the Last Year: Never true   • Ran Out of Food in the Last Year: Never true   Transportation Needs: No Transportation Needs   • Lack of Transportation (Medical): No   • Lack of Transportation (Non-Medical):  No   Physical Activity: Not on file   Stress: Not on file   Social Connections: Not on file   Intimate Partner Violence: Not on file   Housing Stability: Low Risk    • Unable to Pay for Housing in the Last Year: No   • Number of Places Lived in the Last Year: 1   • Unstable Housing in the Last Year: No     Family History   Problem Relation Age of Onset   • Emphysema Mother         never smoked   • Emphysema Father    • Cancer Brother         colon   • Colon cancer Brother    • Ulcerative colitis Family    • Liver disease Family        MEDICATIONS / ALLERGIES:  all current active meds have been reviewed and current meds:   Current Facility-Administered Medications   Medication Dose Route Frequency   • albuterol (PROVENTIL HFA,VENTOLIN HFA) inhaler 2 puff  2 puff Inhalation Q4H PRN   • amLODIPine (NORVASC) tablet 10 mg  10 mg Oral Daily   • budesonide (PULMICORT) inhalation solution 0 5 mg  0 5 mg Nebulization BID   • busPIRone (BUSPAR) tablet 10 mg  10 mg Oral TID   • carbidopa-levodopa (SINEMET)  mg per tablet 1 tablet  1 tablet Oral TID   • chlorthalidone tablet 25 mg  25 mg Oral Daily   • cholestyramine sugar free (QUESTRAN LIGHT) packet 4 g  4 g Oral HS   • enoxaparin (LOVENOX) subcutaneous injection 40 mg  40 mg Subcutaneous Daily   • finasteride (PROSCAR) tablet 5 mg  5 mg Oral QAM   • fluticasone (FLONASE) 50 mcg/act nasal spray 2 spray  2 spray Nasal Daily   • gabapentin (NEURONTIN) capsule 300 mg  300 mg Oral TID   • ipratropium (ATROVENT) 0 02 % inhalation solution 0 5 mg  0 5 mg Nebulization TID   • levalbuterol (XOPENEX) inhalation solution 1 25 mg  1 25 mg Nebulization Q6H PRN • midodrine (PROAMATINE) tablet 10 mg  10 mg Oral TID PRN   • mirtazapine (REMERON) tablet 30 mg  30 mg Oral HS   • pantoprazole (PROTONIX) EC tablet 20 mg  20 mg Oral BID AC   • roflumilast (DALIRESP) tablet 250 mcg  250 mcg Oral Daily   • tamsulosin (FLOMAX) capsule 0 4 mg  0 4 mg Oral Daily   • traZODone (DESYREL) tablet 100 mg  100 mg Oral HS       Allergies   Allergen Reactions   • Chantix [Varenicline]      Caused prostate infection       OBJECTIVE:  /74   Pulse 72   Temp 97 5 °F (36 4 °C)   Resp 17   SpO2 99%   Nursing notes reviewed  Physical Exam  Constitutional:       Appearance: He is normal weight  He is ill-appearing  HENT:      Head: Normocephalic and atraumatic  Nose: Nose normal       Mouth/Throat:      Mouth: Mucous membranes are moist       Pharynx: Oropharynx is clear  Eyes:      Extraocular Movements: Extraocular movements intact  Conjunctiva/sclera: Conjunctivae normal       Pupils: Pupils are equal, round, and reactive to light  Cardiovascular:      Rate and Rhythm: Normal rate and regular rhythm  Pulses: Normal pulses  Pulmonary:      Effort: Pulmonary effort is normal  No respiratory distress  Breath sounds: Wheezing present  Abdominal:      General: There is no distension  Palpations: Abdomen is soft  Tenderness: There is no abdominal tenderness  There is no guarding  Musculoskeletal:         General: Normal range of motion  Right lower leg: Edema present  Left lower leg: Edema present  Skin:     General: Skin is warm and dry  Coloration: Skin is pale  Neurological:      Mental Status: He is alert and oriented to person, place, and time  Psychiatric:         Mood and Affect: Mood normal          Behavior: Behavior normal          Thought Content: Thought content normal          Judgment: Judgment normal        Lab Results: I have personally reviewed pertinent labs      HEMATOLOGY PROFILE:  Results from last 7 days Lab Units 10/07/22  0616 10/06/22  1111 10/05/22  2305 10/05/22  1252   WBC Thousand/uL 9 93 11 35*  --  9 33   HEMOGLOBIN g/dL 12 7 12 6  --  12 2   HEMATOCRIT % 36 6 35 3*  --  34 3*   PLATELETS Thousands/uL 117* 124* 113* 113*   MONO PCT % 7 1*  --  4       CHEMISTRY PROFILE:  Results from last 7 days   Lab Units 10/07/22  0616 10/06/22  1111 10/05/22  1252   POTASSIUM mmol/L 4 0 4 6 3 2*   CHLORIDE mmol/L 99 97 94*   CO2 mmol/L 34* 29 31   BUN mg/dL 23 26* 29*   CREATININE mg/dL 0 77 0 82 0 80   CALCIUM mg/dL 9 3 8 9 8 7   ALK PHOS U/L  --   --  53   ALT U/L  --   --  22   AST U/L  --   --  31       Albumin:  0   Lab Value Date/Time    ALB 3 3 (L) 10/05/2022 1252    ALB 4 5 09/27/2016 1348     Imaging Studies: I have personally reviewed pertinent reports  EKG, Pathology, and Other Studies: I have personally reviewed pertinent reports  Counseling / Coordination of Care: Total floor / unit time spent today 90 minutes  Greater than 50% of total time was spent with the patient and / or family counseling and / or coordination of care  A description of the counseling / coordination of care: symptom assessment and management, medication review, psychosocial support, chart review, imaging review, lab review, goals of care, hospice services, supportive listening and anticipatory guidance  Reji Sunshine, MSN, CRNP, [facility] Palliative and Supportive Care      Portions of this document may have been created using dictation software and as such some "sound alike" terms may have been generated by the system  Do not hesitate to contact me with any questions or clarifications

## 2022-10-07 NOTE — ASSESSMENT & PLAN NOTE
· Patient has a history of alpha-1 antitrypsin deficiency  · On home Zemaira infusion per pulmonology  · Follows with Pulmonary as outpatient  · See plan under acute on chronic respiratory failure  · Consider steroids based on pulm recs  · Reorder labs

## 2022-10-07 NOTE — ASSESSMENT & PLAN NOTE
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  · Chest x-ray shows background emphysema, no acute pathology noted  · This is likely due to deconditioning from the respiratory fatigue  · Sats trending high 90s on 2 5L/min O2    Plan:  · Will resume home inhalers and home medications  · Pulmonary consult --> Recommendation appreciated  · Palliative consult --> Recommendation appreciated

## 2022-10-07 NOTE — ASSESSMENT & PLAN NOTE
· Palliative Care saw the patient on September 12 and a completed 5 wishes document with the patient where he named his daughter Jaya Caban as healthcare agent  · Patient states today that he wants level 1 full code --> Patient transitioned to Level 3 as of 10/6/22

## 2022-10-07 NOTE — PROGRESS NOTES
Progress Note - Pulmonary   Lue Cease 65 y o  male MRN: [id number]  Unit/Bed#: W -01 Encounter: [id number]    Assessment/Plan:    1  Acute pulmonary insufficiency on chronic hypoxic respiratory failure       -  currently on home O2 requirements of 3 L-97%       -  maintain saturations greater 88%       -  pulmonary toileting:  Deep breathing cough,  OOB as tolerated, IS Q 1 hr    2  Severe COPD without acute exacerbation       -  Inpatient:  Budesonide/performed b i d , Xopenex/Atrovent t i d        -  will continue with prednisone 10 mg daily        -  home regimen: Budesonide/Perforomist b i d , Ventolin 2 puffs Q 4 p r n , Xopenex/Atrovent t i d , Daliresp 500 mg daily, prednisone 10 mg    3  Alpha-1 antitrypsin deficiency       -  unfortunately patient has missed most 2 months of alpha-1 protease inhibitor       -  discussed with pharmacy- medication is not available at this network       -  if patient would like he can bring in from home    4  H/O PE        -  2018-chronic PE extending from left main into left interlobular PA       -  if repeat PE will need lifelong anticoagulation    5  Chronic grade 1 diastolic CHF w/ moderate PHTN likely WHO group II & III       -  9/12/2022-  Echo    EF 70%     proBNP unremarkable    PA pressure 43 mmHg       -  overall patient does not appear significantly overloaded       -  recommend keeping patient on drier side      -  pulmonary will see again on Monday    Chief Complaint:    "I am still short of breath"    Subjective:    Rich comfortably sitting in his bed  He reports he is still having shortness of breath  No significant overnight events reported  Patient denies any fever, chills, hemoptysis, headaches night sweats pleuritic chest pain, or palpitations  Objective:    Vitals: Blood pressure 135/74, pulse 72, temperature 97 5 °F (36 4 °C), resp  rate 17, SpO2 99 %  3L,There is no height or weight on file to calculate BMI        Intake/Output Summary (Last 24 hours) at 10/7/2022 0931  Last data filed at 10/7/2022 0902  Gross per 24 hour   Intake 840 ml   Output 1300 ml   Net -460 ml       Invasive Devices  Report    Peripheral Intravenous Line  Duration           Peripheral IV 10/06/22 Dorsal (posterior); Right Forearm <1 day                Physical Exam:   Physical Exam  Constitutional:       General: He is not in acute distress  Appearance: Normal appearance  He is normal weight  He is not ill-appearing  HENT:      Head: Normocephalic and atraumatic  Nose: Nose normal  No congestion or rhinorrhea  Mouth/Throat:      Mouth: Mucous membranes are dry  Pharynx: No oropharyngeal exudate or posterior oropharyngeal erythema  Cardiovascular:      Rate and Rhythm: Normal rate and regular rhythm  Pulses: Normal pulses  Heart sounds: Normal heart sounds  No murmur heard  No friction rub  No gallop  Pulmonary:      Effort: Pulmonary effort is normal  No tachypnea, bradypnea, accessory muscle usage or respiratory distress  Breath sounds: Decreased air movement present  No stridor  Decreased breath sounds present  No wheezing, rhonchi or rales  Comments: Significantly diminished aeration throughout lung fields  Chest:      Chest wall: No tenderness  Abdominal:      General: Abdomen is flat  Bowel sounds are normal  There is no distension  Palpations: Abdomen is soft  There is no mass  Musculoskeletal:         General: No swelling or tenderness  Normal range of motion  Cervical back: Normal range of motion  No rigidity or tenderness  Skin:     General: Skin is warm and dry  Coloration: Skin is not jaundiced or pale  Neurological:      General: No focal deficit present  Mental Status: He is alert and oriented to person, place, and time  Mental status is at baseline  Psychiatric:         Mood and Affect: Mood normal          Behavior: Behavior normal          Labs:    I have personally reviewed pertinent lab results CBC:   Lab Results   Component Value Date    WBC 9 93 10/07/2022    HGB 12 7 10/07/2022    HCT 36 6 10/07/2022    MCV 97 10/07/2022     (L) 10/07/2022    MCH 33 6 10/07/2022    MCHC 34 7 10/07/2022    RDW 14 8 10/07/2022    MPV 9 4 10/07/2022   , CMP:   Lab Results   Component Value Date    SODIUM 138 10/07/2022    K 4 0 10/07/2022    CL 99 10/07/2022    CO2 34 (H) 10/07/2022    BUN 23 10/07/2022    CREATININE 0 77 10/07/2022    CALCIUM 9 3 10/07/2022    EGFR 86 10/07/2022       Imaging and other studies: I have personally reviewed pertinent films in PACS     Chest x-ray- 10/5/2022- no acute cardiopulmonary disease

## 2022-10-07 NOTE — DISCHARGE INSTR - AVS FIRST PAGE
Dear Homero Rajan,     It was our pleasure to care for you here at [facility]  It is our hope that we were always able to exceed the expected standards for your care during your stay  You were hospitalized due to SOB  You were cared for on the 4th floor by Ashanti Cespedes under the service of Javan Kuo MD with the [facility] who covers for your primary care physician (PCP), Kierra Vidal MD, while you were hospitalized  If you have any questions or concerns related to this hospitalization, you may contact us at [phone]  For follow up as well as any medication refills, we recommend that you follow up with your primary care physician  A registered nurse will reach out to you by phone within a few days after your discharge to answer any additional questions that you may have after going home  However, at this time we provide for you here, the most important instructions / recommendations at discharge:     Notable Medication Adjustments -   Daliresp dose changed to 500 mcg daily  Continue home meds: Xoponex, Atrovent, Performist, Pulmicort, and Prednisone  Testing Required after Discharge -   None  Important follow up information -   Pulmonology   Palliative Care  PCP  Other Instructions -   None  Please review this entire after visit summary as additional general instructions including medication list, appointments, activity, diet, any pertinent wound care, and other additional recommendations from your care team that may be provided for you        Sincerely,     Ashanti Cespedes

## 2022-10-07 NOTE — PROGRESS NOTES
[place] [facility]  Progress Note - Javi Pac 1945, 65 y o  male MRN: [id number]  Unit/Bed#: W -01 Encounter: [id number]  Primary Care Provider: Jaylen Landis MD   Date and time admitted to hospital: 10/5/2022 12:04 PM    * Acute on chronic respiratory failure ([facility] )  Assessment & Plan  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  · Chest x-ray shows background emphysema, no acute pathology noted  · This is likely due to deconditioning from the respiratory fatigue  · Sats trending high 90s on 2 5L/min O2    Plan:  · Will resume home inhalers and home medications  · Pulmonary consult --> Recommendation appreciated  · Palliative consult --> Recommendation appreciated  Alpha-1-antitrypsin deficiency [facility])  Assessment & Plan  · Patient has a history of alpha-1 antitrypsin deficiency  · On home Zemaira infusion per pulmonology  · Follows with Pulmonary as outpatient  · See plan under acute on chronic respiratory failure  · Consider steroids based on pulm recs  · Reorder labs    Hyponatremia  Assessment & Plan  · Patient has hyponatremia on admission  · Sodium 133    Plan:  - Gentle hydration  - A m   Labs    Goals of care, counseling/discussion  Assessment & Plan  · Palliative Care saw the patient on September 12 and a completed 5 wishes document with the patient where he named his daughter Ivan Garcia as healthcare agent  · Patient states today that he wants level 1 full code --> Patient transitioned to Level 3 as of 10/6/22  Parkinson disease [facility])  Assessment & Plan  Patient with history of Parkinson's, on Sinemet    Plan:  - Continue Sinemet    Chronic pulmonary embolism ([address] )  Assessment & Plan  · Per chart review, patient completed a 6 month course of Eliquis for a PE per Oncology in 2018  If he develops another acute PE then will need lifelong anticoagulation  · CT chest  shows chronic left-sided PE  · YOBANY on  negative for Witt ovale, 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  EF 70%        VTE Pharmacologic Prophylaxis: VTE Score: 9 High Risk (Score >/= 5) - Pharmacological DVT Prophylaxis Ordered: enoxaparin (Lovenox)  Sequential Compression Devices Ordered  Patient Centered Rounds: I performed bedside rounds with nursing staff today  Discussions with Specialists or Other Care Team Provider: None    Education and Discussions with Family / Patient: Updated  (daughter) via phone  Current Length of Stay: 2 day(s)  Current Patient Status: Inpatient   Discharge Plan: Anticipate discharge in 24-48 hrs to maybe home vs  rehab  Code Status: Level 3 - DNAR and DNI    Subjective:   Patient is 41-year-old male presented to the ED for shortness of breath  Patient was admitted to [facility] for further management  Patient was resting in bed today and was getting ready to get his nebulizer treatment from the respiratory therapist  He states he is doing well today and does not have any other complaints  There were no acute events overnight  Objective:     Vitals:   Temp (24hrs), Av 2 °F (36 8 °C), Min:97 5 °F (36 4 °C), Max:98 9 °F (37 2 °C)    Temp:  [97 5 °F (36 4 °C)-98 9 °F (37 2 °C)] 97 5 °F (36 4 °C)  HR:  [72-83] 72  BP: (135-138)/(73-74) 135/74  SpO2:  [94 %-99 %] 99 %  There is no height or weight on file to calculate BMI       Input and Output Summary (last 24 hours): Intake/Output Summary (Last 24 hours) at 10/7/2022 1129  Last data filed at 10/7/2022 0902  Gross per 24 hour   Intake 840 ml   Output 1300 ml   Net -460 ml       Physical Exam:   Physical Exam  Vitals reviewed  Constitutional:       General: He is in acute distress  HENT:      Head: Normocephalic and atraumatic  Mouth/Throat:      Mouth: Mucous membranes are dry  Eyes:      Extraocular Movements: Extraocular movements intact  Conjunctiva/sclera: Conjunctivae normal       Pupils: Pupils are equal, round, and reactive to light  Cardiovascular:      Rate and Rhythm: Normal rate and regular rhythm  Pulses: Normal pulses  Heart sounds: Normal heart sounds  Pulmonary:      Effort: Respiratory distress present  Breath sounds: Wheezing present  No rhonchi  Abdominal:      General: Abdomen is flat  Palpations: Abdomen is soft  Musculoskeletal:         General: Normal range of motion  Right lower leg: No edema  Left lower leg: No edema  Skin:     General: Skin is warm  Neurological:      Mental Status: He is alert  Mental status is at baseline  Psychiatric:         Mood and Affect: Mood normal          Behavior: Behavior normal          Thought Content:  Thought content normal          Judgment: Judgment normal        Additional Data:     Labs:  Results from last 7 days   Lab Units 10/07/22  0616   WBC Thousand/uL 9 93   HEMOGLOBIN g/dL 12 7   HEMATOCRIT % 36 6   PLATELETS Thousands/uL 117*   BANDS PCT % 1   LYMPHO PCT % 16   MONO PCT % 7   EOS PCT % 0     Results from last 7 days   Lab Units 10/07/22  0616 10/06/22  1111 10/05/22  1252   SODIUM mmol/L 138   < > 133*   POTASSIUM mmol/L 4 0   < > 3 2*   CHLORIDE mmol/L 99   < > 94*   CO2 mmol/L 34*   < > 31   BUN mg/dL 23   < > 29*   CREATININE mg/dL 0 77   < > 0 80   ANION GAP mmol/L 5   < > 8   CALCIUM mg/dL 9 3   < > 8 7   ALBUMIN g/dL  --   --  3 3*   TOTAL BILIRUBIN mg/dL  -- --  0 95   ALK PHOS U/L  --   --  53   ALT U/L  --   --  22   AST U/L  --   --  31   GLUCOSE RANDOM mg/dL 107   < > 93    < > = values in this interval not displayed  Lines/Drains:  Invasive Devices  Report    Peripheral Intravenous Line  Duration           Peripheral IV 10/06/22 Dorsal (posterior); Right Forearm 1 day                      Imaging: No pertinent imaging reviewed  All imaging during this admission reviewed prior      Recent Cultures (last 7 days): None        Last 24 Hours Medication List:   Current Facility-Administered Medications   Medication Dose Route Frequency Provider Last Rate   • albuterol  2 puff Inhalation Q4H PRN Vinny Lowe MD     • amLODIPine  10 mg Oral Daily Vinny Lowe MD     • budesonide  0 5 mg Nebulization BID Shahriar Blanton MD     • busPIRone  10 mg Oral TID Vinny Lowe MD     • carbidopa-levodopa  1 tablet Oral TID Vinny Lowe MD     • chlorthalidone  25 mg Oral Daily Vinny Lowe MD     • cholestyramine sugar free  4 g Oral HS Vinny Lowe MD     • enoxaparin  40 mg Subcutaneous Daily Vinny Lowe MD     • finasteride  5 mg Oral QAM Vinny Lowe MD     • fluticasone  2 spray Nasal Daily Vinny Lowe MD     • furosemide  20 mg Intravenous Once Gerda Fam, DO     • gabapentin  300 mg Oral TID Vinny Lowe MD     • ipratropium  0 5 mg Nebulization TID Shahriar Blanton MD     • levalbuterol  1 25 mg Nebulization Q6H PRN Shahriar Blanton MD     • midodrine  10 mg Oral TID PRN Vinny Lowe MD     • mirtazapine  30 mg Oral HS Vinny Lowe MD     • pantoprazole  20 mg Oral BID AC Vinny Lowe MD     • roflumilast  250 mcg Oral Daily Vinny Lowe MD     • tamsulosin  0 4 mg Oral Daily Vinny Lowe MD     • traZODone  100 mg Oral HS Vinny Lowe MD          Today, Patient Was Seen By: Mk Wynn    **Please Note: This note may have been constructed using a voice recognition system  **

## 2022-10-07 NOTE — CASE MANAGEMENT
Case Management Assessment & Discharge Planning Note    Patient name Crissy Moore  Location W /W -72 MRN [id number]  : 1945 Date 10/7/2022       Current Admission Date: 10/5/2022  Current Admission Diagnosis:Acute on chronic respiratory failure [facility])   Patient Active Problem List    Diagnosis Date Noted   • Goals of care, counseling/discussion 10/05/2022   • Hyponatremia 10/05/2022   • SIRS (systemic inflammatory response syndrome) ([address] ) 2022   • Lactic acidosis 2022   • Panlobular emphysema ([address] )    • H/O cardiac catheterization 2022   • Chest heaviness 2022   • Hypertensive urgency 2022   • Cognitive impairment 2021   • Parkinson disease ([address] ) 2021   • Anxiety 2021   • Vitamin D deficiency disease 2021   • Avascular necrosis of hip, right ([address] ) 2021   • Depression, recurrent ([address] ) 2021   • Orthostatic hypotension with spine hypertension 2020   • Syncope 2020   • COPD with acute exacerbation ([address] ) 2019   • Ambulatory dysfunction 2019   • Insomnia 2019   • Chronic venous insufficiency 2019   • 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 ([address] ) 2019   • Centrilobular emphysema ([address] ) 2018   • CLAYTON (acute kidney injury) ([address] ) 2018   • Acute on chronic respiratory failure ([address] ) 2018   • Chronic pulmonary embolism ([address] ) 2018   • Former smoker 2018   • Liver disease    • Alpha-1-antitrypsin deficiency ([address] ) 2017   • Gastroesophageal reflux disease 2017   • Essential hypertension 2017   • BPH (benign prostatic hyperplasia) 2017   • Peripheral neuropathy 2017   • Allergic rhinitis 2016   • Cataracts, both eyes 2015   • Chronic diarrhea  2014   • Benign colon polyp 2014      LOS (days): 0  Geometric Mean LOS (GMLOS) (days):   Days to GMLOS:     OBJECTIVE:  PATIENT READMITTED TO HOSPITAL  Risk of Unplanned Readmission Score: 32 23         Current admission status: Inpatient       Preferred Pharmacy:   [id number] - [place], [address]  [address] [place] [address]  Phone: [phone] Fax: [phone]    [address] [facility] [address]  [address] [facility] [address]  Phone: [phone] Fax: [phone]    [address], [address]  [address] [facility] [address]  Phone: [phone] Fax: [phone]    Primary Care Provider: Ramiro Joseph MD    Primary Insurance: MEDICARE  Secondary Insurance: SHRUTHI MEDINA FERRER    ASSESSMENT:  Rik Becerra, [address] - Daughter   Primary Phone: [phone] (Mobile)                  Readmission Root Cause  30 Day Readmission: No    Patient Information  Admitted from[de-identified] Facility  Mental Status: Alert  During Assessment patient was accompanied by: Not accompanied during assessment  Assessment information provided by[de-identified] Patient  Primary Caregiver: Self  Support Systems: Daughter  [place] of Residence: [address] [place] [address] do you live in?: [facility] entry access options   Select all that apply : Stairs  Number of steps to enter home : 3  Do the steps have railings?: Yes  Type of Current Residence: [place]  In the last 12 months, was there a time when you were not able to pay the mortgage or rent on time?: No  In the last 12 months, how many places have you lived?: 1  In the last 12 months, was there a time when you did not have a steady place to sleep or slept in a shelter (including now)?: No  Homeless/housing insecurity resource given?: N/A  Living Arrangements: Lives Alone  Is patient a ?: No    Activities of Daily Living Prior to Admission  Functional Status: Independent  Completes ADLs independently?: Yes  Ambulates independently?: Yes  Does patient use assisted devices?: Yes  Assisted Devices (DME) used: Leandro Patel  Does patient currently own DME?: Yes  What DME does the patient currently own?: Bedside Commode, Walker, Shower Chair  Does patient have a history of Outpatient Therapy (PT/OT)?: No  Does the patient have a history of Short-Term Rehab?: Yes ([facility])  Does patient have a history of HHC?: Yes ([facility])  Does patient currently have [address]?: No         Patient Information Continued  Income Source: Pension/prison  Does patient have prescription coverage?: Yes  Within the past 12 months, you worried that your food would run out before you got the money to buy more : Never true  Within the past 12 months, the food you bought just didn't last and you didn't have money to get more : Never true  Food insecurity resource given?: N/A  Does patient receive dialysis treatments?: No  Does patient have a history of substance abuse?: No  Does patient have a history of Mental Health Diagnosis?: No         Means of Transportation  Means of Transport to Appts[de-identified] Family transport  In the past 12 months, has lack of transportation kept you from medical appointments or from getting medications?: No  In the past 12 months, has lack of transportation kept you from meetings, work, or from getting things needed for daily living?: No  Was application for public transport provided?: N/A        DISCHARGE DETAILS:    Discharge planning discussed with[de-identified] Patient  Freedom of Choice: Yes  Comments - Freedom of Choice: CM discussed discharge plans per care team recommendations and patient in agreement to return back to [facility] to complete his rehab - referral made    CM contacted family/caregiver?: Yes  Were Treatment Team discharge recommendations reviewed with patient/caregiver?: Yes  Did patient/caregiver verbalize understanding of patient care needs?: N/A- going to facility  Were patient/caregiver advised of the risks associated with not following Treatment Team discharge recommendations?: Yes    Contacts  Patient Contacts: Norberto Suzette  Relationship to Patient[de-identified] Family  Contact Method: Phone  Phone Number: [phone]  Reason/Outcome: Discharge Planning, Referral, Continuity of [address] [place] [address]         Is the patient interested in [facility] at discharge?: No    DME Referral Provided  Referral made for DME?: No    Other Referral/Resources/Interventions Provided:  Interventions: Short Term Rehab  Referral Comments: CM discussed discharge plans per care team recommendations and patient in agreement to return back to [facility] to complete his rehab - referral made      Would you like to participate in our [facility] service program?  : No - Declined    Treatment Team Recommendation: Short Term Rehab  Discharge Destination Plan[de-identified] [address], [address] : [facility]  Receiving Facility/Agency Phone Number: [phone]  Facility/Agency Fax Number: [phone]

## 2022-10-08 LAB
ANION GAP SERPL CALCULATED.3IONS-SCNC: 7 MMOL/L (ref 4–13)
BASOPHILS # BLD AUTO: 0.04 THOUSANDS/ΜL (ref 0–0.1)
BASOPHILS NFR BLD AUTO: 0 % (ref 0–1)
BUN SERPL-MCNC: 30 MG/DL (ref 5–25)
CALCIUM SERPL-MCNC: 8.7 MG/DL (ref 8.4–10.2)
CHLORIDE SERPL-SCNC: 97 MMOL/L (ref 96–108)
CO2 SERPL-SCNC: 34 MMOL/L (ref 21–32)
CREAT SERPL-MCNC: 0.95 MG/DL (ref 0.6–1.3)
EOSINOPHIL # BLD AUTO: 0.04 THOUSAND/ΜL (ref 0–0.61)
EOSINOPHIL NFR BLD AUTO: 0 % (ref 0–6)
ERYTHROCYTE [DISTWIDTH] IN BLOOD BY AUTOMATED COUNT: 14.9 % (ref 11.6–15.1)
GFR SERPL CREATININE-BSD FRML MDRD: 76 ML/MIN/1.73SQ M
GLUCOSE SERPL-MCNC: 87 MG/DL (ref 65–140)
HCT VFR BLD AUTO: 35.6 % (ref 36.5–49.3)
HGB BLD-MCNC: 12.1 G/DL (ref 12–17)
IMM GRANULOCYTES # BLD AUTO: 0.48 THOUSAND/UL (ref 0–0.2)
IMM GRANULOCYTES NFR BLD AUTO: 5 % (ref 0–2)
LYMPHOCYTES # BLD AUTO: 2.55 THOUSANDS/ΜL (ref 0.6–4.47)
LYMPHOCYTES NFR BLD AUTO: 27 % (ref 14–44)
MCH RBC QN AUTO: 32.6 PG (ref 26.8–34.3)
MCHC RBC AUTO-ENTMCNC: 34 G/DL (ref 31.4–37.4)
MCV RBC AUTO: 96 FL (ref 82–98)
MONOCYTES # BLD AUTO: 0.39 THOUSAND/ΜL (ref 0.17–1.22)
MONOCYTES NFR BLD AUTO: 4 % (ref 4–12)
MRSA NOSE QL CULT: NORMAL
NEUTROPHILS # BLD AUTO: 6.13 THOUSANDS/ΜL (ref 1.85–7.62)
NEUTS SEG NFR BLD AUTO: 64 % (ref 43–75)
NRBC BLD AUTO-RTO: 0 /100 WBCS
PLATELET # BLD AUTO: 115 THOUSANDS/UL (ref 149–390)
PMV BLD AUTO: 10.6 FL (ref 8.9–12.7)
POTASSIUM SERPL-SCNC: 3.7 MMOL/L (ref 3.5–5.3)
RBC # BLD AUTO: 3.71 MILLION/UL (ref 3.88–5.62)
SODIUM SERPL-SCNC: 138 MMOL/L (ref 135–147)
WBC # BLD AUTO: 9.63 THOUSAND/UL (ref 4.31–10.16)

## 2022-10-08 PROCEDURE — 85025 COMPLETE CBC W/AUTO DIFF WBC: CPT

## 2022-10-08 PROCEDURE — 99232 SBSQ HOSP IP/OBS MODERATE 35: CPT | Performed by: HOSPITALIST

## 2022-10-08 PROCEDURE — 94640 AIRWAY INHALATION TREATMENT: CPT

## 2022-10-08 PROCEDURE — 94760 N-INVAS EAR/PLS OXIMETRY 1: CPT

## 2022-10-08 PROCEDURE — 80048 BASIC METABOLIC PNL TOTAL CA: CPT

## 2022-10-08 RX ORDER — FUROSEMIDE 10 MG/ML
20 INJECTION INTRAMUSCULAR; INTRAVENOUS DAILY
Status: COMPLETED | OUTPATIENT
Start: 2022-10-08 | End: 2022-10-09

## 2022-10-08 RX ORDER — PREDNISONE 10 MG/1
10 TABLET ORAL DAILY
Status: DISCONTINUED | OUTPATIENT
Start: 2022-10-08 | End: 2022-10-10 | Stop reason: HOSPADM

## 2022-10-08 RX ADMIN — PREDNISONE 10 MG: 10 TABLET ORAL at 12:48

## 2022-10-08 RX ADMIN — BUDESONIDE 0.5 MG: 0.5 INHALANT ORAL at 09:21

## 2022-10-08 RX ADMIN — BUSPIRONE HYDROCHLORIDE 10 MG: 5 TABLET ORAL at 10:40

## 2022-10-08 RX ADMIN — IPRATROPIUM BROMIDE 0.5 MG: 0.5 SOLUTION RESPIRATORY (INHALATION) at 19:41

## 2022-10-08 RX ADMIN — MIRTAZAPINE 30 MG: 15 TABLET, FILM COATED ORAL at 21:00

## 2022-10-08 RX ADMIN — CHLORTHALIDONE 25 MG: 25 TABLET ORAL at 10:44

## 2022-10-08 RX ADMIN — FLUTICASONE PROPIONATE 2 SPRAY: 50 SPRAY, METERED NASAL at 10:44

## 2022-10-08 RX ADMIN — PANTOPRAZOLE SODIUM 20 MG: 20 TABLET, DELAYED RELEASE ORAL at 16:39

## 2022-10-08 RX ADMIN — FINASTERIDE 5 MG: 5 TABLET, FILM COATED ORAL at 10:39

## 2022-10-08 RX ADMIN — CARBIDOPA AND LEVODOPA 1 TABLET: 25; 100 TABLET ORAL at 20:59

## 2022-10-08 RX ADMIN — GABAPENTIN 300 MG: 300 CAPSULE ORAL at 10:39

## 2022-10-08 RX ADMIN — IPRATROPIUM BROMIDE 0.5 MG: 0.5 SOLUTION RESPIRATORY (INHALATION) at 13:23

## 2022-10-08 RX ADMIN — ENOXAPARIN SODIUM 40 MG: 40 INJECTION SUBCUTANEOUS at 10:39

## 2022-10-08 RX ADMIN — TRAZODONE HYDROCHLORIDE 100 MG: 100 TABLET ORAL at 21:01

## 2022-10-08 RX ADMIN — TAMSULOSIN HYDROCHLORIDE 0.4 MG: 0.4 CAPSULE ORAL at 10:39

## 2022-10-08 RX ADMIN — IPRATROPIUM BROMIDE 0.5 MG: 0.5 SOLUTION RESPIRATORY (INHALATION) at 09:21

## 2022-10-08 RX ADMIN — CARBIDOPA AND LEVODOPA 1 TABLET: 25; 100 TABLET ORAL at 10:39

## 2022-10-08 RX ADMIN — CARBIDOPA AND LEVODOPA 1 TABLET: 25; 100 TABLET ORAL at 16:39

## 2022-10-08 RX ADMIN — BUDESONIDE 0.5 MG: 0.5 INHALANT ORAL at 19:41

## 2022-10-08 RX ADMIN — GABAPENTIN 300 MG: 300 CAPSULE ORAL at 16:40

## 2022-10-08 RX ADMIN — PANTOPRAZOLE SODIUM 20 MG: 20 TABLET, DELAYED RELEASE ORAL at 05:26

## 2022-10-08 RX ADMIN — AMLODIPINE BESYLATE 10 MG: 10 TABLET ORAL at 10:39

## 2022-10-08 RX ADMIN — BUSPIRONE HYDROCHLORIDE 10 MG: 5 TABLET ORAL at 16:39

## 2022-10-08 RX ADMIN — BUSPIRONE HYDROCHLORIDE 10 MG: 5 TABLET ORAL at 21:00

## 2022-10-08 RX ADMIN — FUROSEMIDE 20 MG: 10 INJECTION, SOLUTION INTRAMUSCULAR; INTRAVENOUS at 11:32

## 2022-10-08 RX ADMIN — GABAPENTIN 300 MG: 300 CAPSULE ORAL at 21:00

## 2022-10-08 NOTE — PROGRESS NOTES
[facility]  Progress Note - Toño Strickland 1945, 65 y o  male MRN: [id number]  Unit/Bed#: W -01 Encounter: [id number]  Primary Care Provider: Jocelyn Houston MD   Date and time admitted to hospital: 10/5/2022 12:04 PM    * Acute on chronic respiratory failure ([facility] )  Assessment & Plan  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  · Chest x-ray shows background emphysema, no acute pathology noted  · This is likely due to deconditioning from the respiratory fatigue  · Sats trending high 90s on 2 5L/min O2    Plan:  · Will resume home inhalers and home medications - Budesonide/Perforomist b i d , Ventolin 2 puffs Q 4 p r n , Xopenex/Atrovent t i d , Daliresp 500 mg daily, prednisone 10 mg  · Pulmonary consult --> Recommendation appreciated  · Palliative consult --> Recommendation appreciated  Alpha-1-antitrypsin deficiency [facility])  Assessment & Plan  · Patient has a history of alpha-1 antitrypsin deficiency  · On home Zemaira infusion per pulmonology  · Follows with Pulmonary as outpatient  · See plan under acute on chronic respiratory failure  · Consider steroids based on pulm recs  · Reorder labs    Hyponatremia  Assessment & Plan  · Patient has hyponatremia on admission  · Sodium 133    Plan:  - Gentle hydration  - A m   Labs    Goals of care, counseling/discussion  Assessment & Plan  · Palliative Care saw the patient on  and a completed 5 wishes document with the patient where he named his daughter Je Benz as healthcare agent  · Patient states today that he wants level 1 full code --> Patient transitioned to Level 3 as of 10/6/22  Parkinson disease [facility])  Assessment & Plan  Patient with history of Parkinson's, on Sinemet    Plan:  - Continue Sinemet    Chronic pulmonary embolism ([address] )  Assessment & Plan  · Per chart review, patient completed a 6 month course of Eliquis for a PE per Oncology in 2018  If he develops another acute PE then will need lifelong anticoagulation  · CT chest  shows chronic left-sided PE  · YOBANY on  negative for Witt ovale, 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  EF 70%          VTE Pharmacologic Prophylaxis: VTE Score: 9 High Risk (Score >/= 5) - Pharmacological DVT Prophylaxis Ordered: enoxaparin (Lovenox)  Sequential Compression Devices Ordered  Patient Centered Rounds: I performed bedside rounds with nursing staff today  Discussions with Specialists or Other Care Team Provider: None    Education and Discussions with Family / Patient: Updated  (daughter) via phone  Current Length of Stay: 3 day(s)  Current Patient Status: Inpatient   Discharge Plan: Anticipate discharge in 24-48 hrs to rehab facility  Code Status: Level 3 - DNAR and DNI    Subjective:   Patient is a 11-year-old male who presented to the ED for shortness of breath  Patient was admitted to [facility] for further management  Patient states that he does not feel well this morning and he is feeling dizzy  Patient's blood pressure was taken at that time and was 129/93  There were no acute events overnight       Objective:     Vitals:   Temp (24hrs), Av °F (36 7 °C), Min:97 5 °F (36 4 °C), Max:98 2 °F (36 8 °C)    Temp:  [97 5 °F (36 4 °C)-98 2 °F (36 8 °C)] 98 2 °F (36 8 °C)  HR:  [85-96] 96  Resp:  [20] 20  BP: (121-133)/(69-93) 129/93  SpO2:  [94 %-97 %] 95 %  There is no height or weight on file to calculate BMI  Input and Output Summary (last 24 hours): Intake/Output Summary (Last 24 hours) at 10/8/2022 1210  Last data filed at 10/8/2022 0851  Gross per 24 hour   Intake 300 ml   Output 575 ml   Net -275 ml       Physical Exam:   Physical Exam  Vitals reviewed  Constitutional:       Appearance: He is ill-appearing  HENT:      Head: Normocephalic and atraumatic  Mouth/Throat:      Mouth: Mucous membranes are dry  Eyes:      Extraocular Movements: Extraocular movements intact  Conjunctiva/sclera: Conjunctivae normal       Pupils: Pupils are equal, round, and reactive to light  Cardiovascular:      Rate and Rhythm: Normal rate and regular rhythm  Pulses: Normal pulses  Heart sounds: Normal heart sounds  Pulmonary:      Effort: Respiratory distress present  Breath sounds: No wheezing (No wheezing appreciated today  )  Abdominal:      General: Abdomen is flat  Palpations: Abdomen is soft  Musculoskeletal:         General: Normal range of motion  Right lower leg: No edema  Left lower leg: No edema  Skin:     General: Skin is warm  Neurological:      General: No focal deficit present  Mental Status: He is alert  Mental status is at baseline  Psychiatric:         Mood and Affect: Mood normal          Behavior: Behavior normal          Thought Content:  Thought content normal          Judgment: Judgment normal         Additional Data:     Labs:  Results from last 7 days   Lab Units 10/08/22  0504 10/07/22  0616   WBC Thousand/uL 9 63 9 93   HEMOGLOBIN g/dL 12 1 12 7   HEMATOCRIT % 35 6* 36 6   PLATELETS Thousands/uL 115* 117*   BANDS PCT %  --  1   NEUTROS PCT % 64  --    LYMPHS PCT % 27  --    LYMPHO PCT %  --  16   MONOS PCT % 4  --    MONO PCT %  --  7   EOS PCT % 0 0     Results from last 7 days   Lab Units 10/08/22  0504 10/06/22  1111 10/05/22  1252   SODIUM mmol/L 138 < > 133*   POTASSIUM mmol/L 3 7   < > 3 2*   CHLORIDE mmol/L 97   < > 94*   CO2 mmol/L 34*   < > 31   BUN mg/dL 30*   < > 29*   CREATININE mg/dL 0 95   < > 0 80   ANION GAP mmol/L 7   < > 8   CALCIUM mg/dL 8 7   < > 8 7   ALBUMIN g/dL  --   --  3 3*   TOTAL BILIRUBIN mg/dL  --   --  0 95   ALK PHOS U/L  --   --  53   ALT U/L  --   --  22   AST U/L  --   --  31   GLUCOSE RANDOM mg/dL 87   < > 93    < > = values in this interval not displayed  Lines/Drains:  Invasive Devices  Report    Peripheral Intravenous Line  Duration           Peripheral IV 10/06/22 Dorsal (posterior); Right Forearm 2 days                      Imaging: No pertinent imaging reviewed  All imaging during this admission reviewed prior      Recent Cultures (last 7 days): None        Last 24 Hours Medication List:   Current Facility-Administered Medications   Medication Dose Route Frequency Provider Last Rate   • albuterol  2 puff Inhalation Q4H PRN Beatrice Ladd MD     • amLODIPine  10 mg Oral Daily Beatrice Ladd MD     • budesonide  0 5 mg Nebulization BID Prakash Soler MD     • busPIRone  10 mg Oral TID Beatrice Ladd MD     • carbidopa-levodopa  1 tablet Oral TID Beatrice Ladd MD     • chlorthalidone  25 mg Oral Daily Beatrice Ladd MD     • cholestyramine sugar free  4 g Oral HS Beatrice Ladd MD     • enoxaparin  40 mg Subcutaneous Daily Beatrice Ladd MD     • finasteride  5 mg Oral QAM Beatrice Ladd MD     • fluticasone  2 spray Nasal Daily Beatrice Ladd MD     • furosemide  20 mg Intravenous Daily Davin Cheema DO     • gabapentin  300 mg Oral TID Beatrice Ladd MD     • ipratropium  0 5 mg Nebulization TID Prakash Soler MD     • levalbuterol  1 25 mg Nebulization Q6H PRN Prakash Soler MD     • midodrine  10 mg Oral TID PRN Beatrice Ladd MD     • mirtazapine  30 mg Oral HS Beatrice Ladd MD • pantoprazole  20 mg Oral BID AC Joel Cantu MD     • predniSONE  10 mg Oral Daily Bang Duffy DO     • [START ON 10/9/2022] roflumilast  500 mcg Oral Daily Bang Duffy DO     • tamsulosin  0 4 mg Oral Daily Joel Cantu MD     • traZODone  100 mg Oral HS Joel Cantu MD          Today, Patient Was Seen By: Bang Duffy    **Please Note: This note may have been constructed using a voice recognition system  **

## 2022-10-08 NOTE — PLAN OF CARE
Problem: Potential for Falls  Goal: Patient will remain free of falls  Description: INTERVENTIONS:  - Educate patient/family on patient safety including physical limitations  - Instruct patient to call for assistance with activity   - Consult OT/PT to assist with strengthening/mobility   - Keep Call bell within reach  - Keep bed low and locked with side rails adjusted as appropriate  - Keep care items and personal belongings within reach  - Initiate and maintain comfort rounds  - Make Fall Risk Sign visible to staff  - Offer Toileting every  Hours, in advance of need  - Initiate/Maintain alarm  - Obtain necessary fall risk management equipment  - Apply yellow socks and bracelet for high fall risk patients  - Consider moving patient to room near nurses station  10/8/2022 1200 by Lucie Rubalcava RN  Outcome: Progressing  10/8/2022 1200 by Lucie Rubalcava RN  Outcome: Progressing     Problem: MOBILITY - ADULT  Goal: Maintain or return to baseline ADL function  Description: INTERVENTIONS:  -  Assess patient's ability to carry out ADLs; assess patient's baseline for ADL function and identify physical deficits which impact ability to perform ADLs (bathing, care of mouth/teeth, toileting, grooming, dressing, etc )  - Assess/evaluate cause of self-care deficits   - Assess range of motion  - Assess patient's mobility; develop plan if impaired  - Assess patient's need for assistive devices and provide as appropriate  - Encourage maximum independence but intervene and supervise when necessary  - Involve family in performance of ADLs  - Assess for home care needs following discharge   - Consider OT consult to assist with ADL evaluation and planning for discharge  - Provide patient education as appropriate  10/8/2022 1200 by Lucie Rubalcava RN  Outcome: Progressing  10/8/2022 1200 by Lucie Rubalcava RN  Outcome: Progressing  Goal: Maintains/Returns to pre admission functional level  Description: INTERVENTIONS:  - Perform BMAT or MOVE assessment daily    - Set and communicate daily mobility goal to care team and patient/family/caregiver  - Collaborate with rehabilitation services on mobility goals if consulted  - Perform Range of Motion  times a day  - Reposition patient every  hours    - Dangle patient  times a day  - Stand patient  times a day  - Ambulate patient  times a day  - Out of bed to chair  times a day   - Out of bed for meal times a day  - Out of bed for toileting  - Record patient progress and toleration of activity level   10/8/2022 1200 by Susan Marcum RN  Outcome: Progressing  10/8/2022 1200 by Susan Marcum RN  Outcome: Progressing     Problem: Prexisting or High Potential for Compromised Skin Integrity  Goal: Skin integrity is maintained or improved  Description: INTERVENTIONS:  - Identify patients at risk for skin breakdown  - Assess and monitor skin integrity  - Assess and monitor nutrition and hydration status  - Monitor labs   - Assess for incontinence   - Turn and reposition patient  - Assist with mobility/ambulation  - Relieve pressure over bony prominences  - Avoid friction and shearing  - Provide appropriate hygiene as needed including keeping skin clean and dry  - Evaluate need for skin moisturizer/barrier cream  - Collaborate with interdisciplinary team   - Patient/family teaching  - Consider wound care consult   10/8/2022 1200 by Susan Marcum RN  Outcome: Progressing  10/8/2022 1200 by Susan Marcum RN  Outcome: Progressing

## 2022-10-08 NOTE — ASSESSMENT & PLAN NOTE
· Palliative Care saw the patient on September 12 and a completed 5 wishes document with the patient where he named his daughter Isai Ochoa as healthcare agent  · Patient states today that he wants level 1 full code --> Patient transitioned to Level 3 as of 10/6/22

## 2022-10-08 NOTE — ASSESSMENT & PLAN NOTE
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

## 2022-10-09 PROBLEM — E87.1 HYPONATREMIA: Status: RESOLVED | Noted: 2022-10-05 | Resolved: 2022-10-09

## 2022-10-09 PROBLEM — E87.8 ELECTROLYTE ABNORMALITY: Status: ACTIVE | Noted: 2022-10-09

## 2022-10-09 LAB
ANION GAP SERPL CALCULATED.3IONS-SCNC: 8 MMOL/L (ref 4–13)
BASOPHILS # BLD MANUAL: 0 THOUSAND/UL (ref 0–0.1)
BASOPHILS NFR MAR MANUAL: 0 % (ref 0–1)
BUN SERPL-MCNC: 36 MG/DL (ref 5–25)
CALCIUM SERPL-MCNC: 8.6 MG/DL (ref 8.4–10.2)
CHLORIDE SERPL-SCNC: 95 MMOL/L (ref 96–108)
CO2 SERPL-SCNC: 32 MMOL/L (ref 21–32)
CREAT SERPL-MCNC: 0.93 MG/DL (ref 0.6–1.3)
EOSINOPHIL # BLD MANUAL: 0 THOUSAND/UL (ref 0–0.4)
EOSINOPHIL NFR BLD MANUAL: 0 % (ref 0–6)
ERYTHROCYTE [DISTWIDTH] IN BLOOD BY AUTOMATED COUNT: 15.1 % (ref 11.6–15.1)
GFR SERPL CREATININE-BSD FRML MDRD: 78 ML/MIN/1.73SQ M
GLUCOSE SERPL-MCNC: 111 MG/DL (ref 65–140)
HCT VFR BLD AUTO: 34.4 % (ref 36.5–49.3)
HGB BLD-MCNC: 12 G/DL (ref 12–17)
LYMPHOCYTES # BLD AUTO: 2.46 THOUSAND/UL (ref 0.6–4.47)
LYMPHOCYTES # BLD AUTO: 27 % (ref 14–44)
MCH RBC QN AUTO: 33.5 PG (ref 26.8–34.3)
MCHC RBC AUTO-ENTMCNC: 34.9 G/DL (ref 31.4–37.4)
MCV RBC AUTO: 96 FL (ref 82–98)
MONOCYTES # BLD AUTO: 0.09 THOUSAND/UL (ref 0–1.22)
MONOCYTES NFR BLD: 1 % (ref 4–12)
NEUTROPHILS # BLD MANUAL: 6.55 THOUSAND/UL (ref 1.85–7.62)
NEUTS BAND NFR BLD MANUAL: 1 % (ref 0–8)
NEUTS SEG NFR BLD AUTO: 71 % (ref 43–75)
PLATELET # BLD AUTO: 111 THOUSANDS/UL (ref 149–390)
PLATELET BLD QL SMEAR: ABNORMAL
PMV BLD AUTO: 10.4 FL (ref 8.9–12.7)
POTASSIUM SERPL-SCNC: 3.4 MMOL/L (ref 3.5–5.3)
RBC # BLD AUTO: 3.58 MILLION/UL (ref 3.88–5.62)
SODIUM SERPL-SCNC: 135 MMOL/L (ref 135–147)
WBC # BLD AUTO: 9.1 THOUSAND/UL (ref 4.31–10.16)

## 2022-10-09 PROCEDURE — 94760 N-INVAS EAR/PLS OXIMETRY 1: CPT

## 2022-10-09 PROCEDURE — 85007 BL SMEAR W/DIFF WBC COUNT: CPT

## 2022-10-09 PROCEDURE — 80048 BASIC METABOLIC PNL TOTAL CA: CPT

## 2022-10-09 PROCEDURE — 99232 SBSQ HOSP IP/OBS MODERATE 35: CPT | Performed by: HOSPITALIST

## 2022-10-09 PROCEDURE — 94640 AIRWAY INHALATION TREATMENT: CPT

## 2022-10-09 PROCEDURE — 85027 COMPLETE CBC AUTOMATED: CPT

## 2022-10-09 RX ORDER — POTASSIUM CHLORIDE 20 MEQ/1
40 TABLET, EXTENDED RELEASE ORAL 2 TIMES DAILY
Status: COMPLETED | OUTPATIENT
Start: 2022-10-09 | End: 2022-10-09

## 2022-10-09 RX ADMIN — ROFLUMILAST 500 MCG: 250 TABLET ORAL at 08:52

## 2022-10-09 RX ADMIN — POTASSIUM CHLORIDE 40 MEQ: 1500 TABLET, EXTENDED RELEASE ORAL at 17:00

## 2022-10-09 RX ADMIN — PANTOPRAZOLE SODIUM 20 MG: 20 TABLET, DELAYED RELEASE ORAL at 04:25

## 2022-10-09 RX ADMIN — TRAZODONE HYDROCHLORIDE 100 MG: 100 TABLET ORAL at 21:25

## 2022-10-09 RX ADMIN — CARBIDOPA AND LEVODOPA 1 TABLET: 25; 100 TABLET ORAL at 21:25

## 2022-10-09 RX ADMIN — BUSPIRONE HYDROCHLORIDE 10 MG: 5 TABLET ORAL at 21:25

## 2022-10-09 RX ADMIN — IPRATROPIUM BROMIDE 0.5 MG: 0.5 SOLUTION RESPIRATORY (INHALATION) at 13:10

## 2022-10-09 RX ADMIN — MIRTAZAPINE 30 MG: 15 TABLET, FILM COATED ORAL at 21:25

## 2022-10-09 RX ADMIN — BUSPIRONE HYDROCHLORIDE 10 MG: 5 TABLET ORAL at 17:00

## 2022-10-09 RX ADMIN — BUSPIRONE HYDROCHLORIDE 10 MG: 5 TABLET ORAL at 08:47

## 2022-10-09 RX ADMIN — ENOXAPARIN SODIUM 40 MG: 40 INJECTION SUBCUTANEOUS at 08:47

## 2022-10-09 RX ADMIN — FUROSEMIDE 20 MG: 10 INJECTION, SOLUTION INTRAMUSCULAR; INTRAVENOUS at 08:47

## 2022-10-09 RX ADMIN — IPRATROPIUM BROMIDE 0.5 MG: 0.5 SOLUTION RESPIRATORY (INHALATION) at 07:18

## 2022-10-09 RX ADMIN — FLUTICASONE PROPIONATE 2 SPRAY: 50 SPRAY, METERED NASAL at 08:50

## 2022-10-09 RX ADMIN — CARBIDOPA AND LEVODOPA 1 TABLET: 25; 100 TABLET ORAL at 08:47

## 2022-10-09 RX ADMIN — PANTOPRAZOLE SODIUM 20 MG: 20 TABLET, DELAYED RELEASE ORAL at 17:00

## 2022-10-09 RX ADMIN — ALBUTEROL SULFATE 2 PUFF: 90 AEROSOL, METERED RESPIRATORY (INHALATION) at 08:46

## 2022-10-09 RX ADMIN — ALBUTEROL SULFATE 2 PUFF: 90 AEROSOL, METERED RESPIRATORY (INHALATION) at 14:29

## 2022-10-09 RX ADMIN — CARBIDOPA AND LEVODOPA 1 TABLET: 25; 100 TABLET ORAL at 17:00

## 2022-10-09 RX ADMIN — CHOLESTYRAMINE 4 G: 4 POWDER, FOR SUSPENSION ORAL at 21:25

## 2022-10-09 RX ADMIN — BUDESONIDE 0.5 MG: 0.5 INHALANT ORAL at 07:18

## 2022-10-09 RX ADMIN — POTASSIUM CHLORIDE 40 MEQ: 1500 TABLET, EXTENDED RELEASE ORAL at 08:47

## 2022-10-09 RX ADMIN — BUDESONIDE 0.5 MG: 0.5 INHALANT ORAL at 19:59

## 2022-10-09 RX ADMIN — CHLORTHALIDONE 25 MG: 25 TABLET ORAL at 08:52

## 2022-10-09 RX ADMIN — TAMSULOSIN HYDROCHLORIDE 0.4 MG: 0.4 CAPSULE ORAL at 08:47

## 2022-10-09 RX ADMIN — GABAPENTIN 300 MG: 300 CAPSULE ORAL at 21:25

## 2022-10-09 RX ADMIN — ALBUTEROL SULFATE 2 PUFF: 90 AEROSOL, METERED RESPIRATORY (INHALATION) at 22:43

## 2022-10-09 RX ADMIN — FINASTERIDE 5 MG: 5 TABLET, FILM COATED ORAL at 08:47

## 2022-10-09 RX ADMIN — PREDNISONE 10 MG: 10 TABLET ORAL at 08:47

## 2022-10-09 RX ADMIN — AMLODIPINE BESYLATE 10 MG: 10 TABLET ORAL at 08:47

## 2022-10-09 RX ADMIN — GABAPENTIN 300 MG: 300 CAPSULE ORAL at 17:00

## 2022-10-09 RX ADMIN — IPRATROPIUM BROMIDE 0.5 MG: 0.5 SOLUTION RESPIRATORY (INHALATION) at 19:59

## 2022-10-09 RX ADMIN — GABAPENTIN 300 MG: 300 CAPSULE ORAL at 08:47

## 2022-10-09 NOTE — PLAN OF CARE
Problem: Potential for Falls  Goal: Patient will remain free of falls  Description: INTERVENTIONS:  - Educate patient/family on patient safety including physical limitations  - Instruct patient to call for assistance with activity   - Consult OT/PT to assist with strengthening/mobility   - Keep Call bell within reach  - Keep bed low and locked with side rails adjusted as appropriate  - Keep care items and personal belongings within reach  - Initiate and maintain comfort rounds  - Make Fall Risk Sign visible to staff  - Offer Toileting every  Hours, in advance of need  - Initiate/Maintain alarm  - Obtain necessary fall risk management equipment  - Apply yellow socks and bracelet for high fall risk patients  - Consider moving patient to room near nurses station  Outcome: Progressing     Problem: MOBILITY - ADULT  Goal: Maintain or return to baseline ADL function  Description: INTERVENTIONS:  -  Assess patient's ability to carry out ADLs; assess patient's baseline for ADL function and identify physical deficits which impact ability to perform ADLs (bathing, care of mouth/teeth, toileting, grooming, dressing, etc )  - Assess/evaluate cause of self-care deficits   - Assess range of motion  - Assess patient's mobility; develop plan if impaired  - Assess patient's need for assistive devices and provide as appropriate  - Encourage maximum independence but intervene and supervise when necessary  - Involve family in performance of ADLs  - Assess for home care needs following discharge   - Consider OT consult to assist with ADL evaluation and planning for discharge  - Provide patient education as appropriate  Outcome: Progressing  Goal: Maintains/Returns to pre admission functional level  Description: INTERVENTIONS:  - Perform BMAT or MOVE assessment daily    - Set and communicate daily mobility goal to care team and patient/family/caregiver     - Collaborate with rehabilitation services on mobility goals if consulted  - Perform Range of Motion  times a day  - Reposition patient every  hours    - Dangle patient  times a day  - Stand patient  times a day  - Ambulate patient  times a day  - Out of bed to chair  times a day   - Out of bed for meal times a day  - Out of bed for toileting  - Record patient progress and toleration of activity level   Outcome: Progressing     Problem: Prexisting or High Potential for Compromised Skin Integrity  Goal: Skin integrity is maintained or improved  Description: INTERVENTIONS:  - Identify patients at risk for skin breakdown  - Assess and monitor skin integrity  - Assess and monitor nutrition and hydration status  - Monitor labs   - Assess for incontinence   - Turn and reposition patient  - Assist with mobility/ambulation  - Relieve pressure over bony prominences  - Avoid friction and shearing  - Provide appropriate hygiene as needed including keeping skin clean and dry  - Evaluate need for skin moisturizer/barrier cream  - Collaborate with interdisciplinary team   - Patient/family teaching  - Consider wound care consult   Outcome: Progressing

## 2022-10-09 NOTE — ASSESSMENT & PLAN NOTE
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

## 2022-10-09 NOTE — ASSESSMENT & PLAN NOTE
· Patient has hyponatremia on admission  · Sodium 133    Plan:  - Gentle hydration  - A m  Labs    Resolved   Na+ 135 (on 10/9)

## 2022-10-09 NOTE — ASSESSMENT & PLAN NOTE
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  · Chest x-ray shows background emphysema, no acute pathology noted  · This is likely due to deconditioning from the respiratory fatigue  · Sats trending high 90s on 2 5L/min O2    Plan:  · Will resume home inhalers and home medications - Budesonide/Perforomist b i d , Ventolin 2 puffs Q 4 p r n , Xopenex/Atrovent t i d , Daliresp 500 mg daily, prednisone 10 mg  · Pulmonary consult recs:    -currently on home O2 requirement of 3 L-97%        -  maintain saturations greater than 88%        -  pulmonary toilet:  Deep breathing cough, OOB as tolerated, IS Q 1 hr        -  cont  Budesonide/Perforomist b i d , Xopenex/Atrovent t i d         -  d/c IV steroids and start prednisone 10 mg daily  · Palliative consult --> Recommendation appreciated    · Await PT/OT

## 2022-10-09 NOTE — ASSESSMENT & PLAN NOTE
Assessment:  Patient was found to have a K+ of 3 4 on BMP (10/9)    Plan:  - KCL 40 mEq bid (on 10/9)

## 2022-10-09 NOTE — ASSESSMENT & PLAN NOTE
· Palliative Care saw the patient on September 12 and a completed 5 wishes document with the patient where he named his daughter Latosha Kaufman as healthcare agent  · Patient states today that he wants level 1 full code --> Patient transitioned to Level 3 as of 10/6/22

## 2022-10-09 NOTE — ASSESSMENT & PLAN NOTE
· Patient has a history of alpha-1 antitrypsin deficiency  · On home Zemaira infusion per pulmonology  · Follows with Pulmonary as outpatient    Plan:   · See plan under acute on chronic respiratory failure  · Prednisone 10 mg daily, per pulm rec  · Reorder labs  · Follow up w/ Pulm outpatient

## 2022-10-09 NOTE — PROGRESS NOTES
[facility]  Progress Note - Ayaka Tatum 1945, 65 y o  male MRN: [id number]  Unit/Bed#: W -01 Encounter: [id number]  Primary Care Provider: Shelly Haji MD   Date and time admitted to hospital: 10/5/2022 12:04 PM    * Acute on chronic respiratory failure ([address] )  Assessment & Plan  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  · Chest x-ray shows background emphysema, no acute pathology noted  · This is likely due to deconditioning from the respiratory fatigue  · Sats trending high 90s on 2 5L/min O2    Plan:  · Will resume home inhalers and home medications - Budesonide/Perforomist b i d , Ventolin 2 puffs Q 4 p r n , Xopenex/Atrovent t i d , Daliresp 500 mg daily, prednisone 10 mg  · Pulmonary consult recs:    -currently on home O2 requirement of 3 L-97%        -  maintain saturations greater than 88%        -  pulmonary toilet:  Deep breathing cough, OOB as tolerated, IS Q 1 hr        -  cont  Budesonide/Perforomist b i d , Xopenex/Atrovent t i d         -  d/c IV steroids and start prednisone 10 mg daily  · Palliative consult --> Recommendation appreciated    · Await PT/OT    Alpha-1-antitrypsin deficiency [facility])  Assessment & Plan  · Patient has a history of alpha-1 antitrypsin deficiency  · On home Zemaira infusion per pulmonology  · Follows with Pulmonary as outpatient    Plan:   · See plan under acute on chronic respiratory failure  · Prednisone 10 mg daily, per pulm rec  · Reorder labs  · Follow up w/ Pulm outpatient    Electrolyte abnormality  Assessment & Plan  Assessment:  Patient was found to have a K+ of 3 4 on BMP (10/9)    Plan:  - KCL 40 mEq bid (on 10/9)    Chronic pulmonary embolism ([address] )  Assessment & Plan  · Per chart review, patient completed a 6 month course of Eliquis for a PE per Oncology in 2018  CT chest September 7th shows chronic left-sided PE  · YOBANY on September 12 negative for Witt ovale, 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  EF 70%    Plan:  - continue to monitor  - If pt develops another acute PE then will need lifelong anticoagulation      Parkinson disease [facility])  Assessment & Plan  Patient with history of Parkinson's, on Sinemet    Plan:  - Continue Sinemet    Goals of care, counseling/discussion  Assessment & Plan  · Palliative Care saw the patient on September 12 and a completed 5 wishes document with the patient where he named his daughter Bryanna Neves as healthcare agent  · Patient states today that he wants level 1 full code --> Patient transitioned to Level 3 as of 10/6/22  Hyponatremia-resolved as of 10/9/2022  Assessment & Plan  · Patient has hyponatremia on admission  · Sodium 133    Plan:  - Gentle hydration  - A m  Labs    Resolved  Na+ 135 (on 10/9)    VTE Pharmacologic Prophylaxis: VTE Score: 9 High Risk (Score >/= 5) - Pharmacological DVT Prophylaxis Ordered: enoxaparin (Lovenox)  Sequential Compression Devices Ordered  Patient Centered Rounds: I performed bedside rounds with nursing staff today  Discussions with Specialists or Other Care Team Provider:  None    Education and Discussions with Family / Patient:  Educated patient at bedside  Attempted to update  (daughter) via phone  Left voicemail       Current Length of Stay: 2 day(s)  Current Patient Status: Inpatient   Discharge Plan: Anticipate discharge tomorrow to home     Code Status: Level 3 - DNAR and DNI    Subjective: Today, patient reports some shortness of breath with exertion and a lot of movement but denies any SOB at rest   Patient denies any CP, abdominal pain, fever or chills, or changes in bowel movements  However, patient reports some lightheadedness with movement  Patient also reports urinary incontinence and urgency, but says that this is his baseline (patient has a history of BPH, currently on Flomax 0 4 mg daily)  Objective:     Vitals:   Temp (24hrs), Av 1 °F (37 3 °C), Min:97 9 °F (36 6 °C), Max:99 9 °F (37 7 °C)    Temp:  [97 9 °F (36 6 °C)-99 9 °F (37 7 °C)] 97 9 °F (36 6 °C)  HR:  [86-95] 86  Resp:  [18] 18  BP: (113-122)/(65-73) 122/73  SpO2:  [94 %-96 %] 95 %  There is no height or weight on file to calculate BMI  Input and Output Summary (last 24 hours): Intake/Output Summary (Last 24 hours) at 10/9/2022 1132  Last data filed at 10/9/2022 0850  Gross per 24 hour   Intake 1020 ml   Output 1200 ml   Net -180 ml       Physical Exam:   Physical Exam  Vitals and nursing note reviewed  Constitutional:       Appearance: He is well-developed and normal weight  HENT:      Head: Normocephalic and atraumatic  Eyes:      Conjunctiva/sclera: Conjunctivae normal    Cardiovascular:      Rate and Rhythm: Normal rate and regular rhythm  Heart sounds: Normal heart sounds, S1 normal and S2 normal  No murmur heard  Pulmonary:      Effort: Pulmonary effort is normal  No respiratory distress  Breath sounds: Normal breath sounds  Comments: No wheezes were appreciated at this time  However, patient is on 3 L/min O2 and received breathing treatments 40 minutes prior  Abdominal:      Palpations: Abdomen is soft  Tenderness: There is no abdominal tenderness  Musculoskeletal:      Cervical back: Neck supple  Skin:     General: Skin is warm and dry  Neurological:      Mental Status: He is alert           Additional Data: Labs:  Results from last 7 days   Lab Units 10/09/22  0425 10/08/22  0504   WBC Thousand/uL 9 10 9 63   HEMOGLOBIN g/dL 12 0 12 1   HEMATOCRIT % 34 4* 35 6*   PLATELETS Thousands/uL 111* 115*   BANDS PCT % 1  --    NEUTROS PCT %  --  64   LYMPHS PCT %  --  27   LYMPHO PCT % 27  --    MONOS PCT %  --  4   MONO PCT % 1*  --    EOS PCT % 0 0     Results from last 7 days   Lab Units 10/09/22  0425 10/06/22  1111 10/05/22  1252   SODIUM mmol/L 135   < > 133*   POTASSIUM mmol/L 3 4*   < > 3 2*   CHLORIDE mmol/L 95*   < > 94*   CO2 mmol/L 32   < > 31   BUN mg/dL 36*   < > 29*   CREATININE mg/dL 0 93   < > 0 80   ANION GAP mmol/L 8   < > 8   CALCIUM mg/dL 8 6   < > 8 7   ALBUMIN g/dL  --   --  3 3*   TOTAL BILIRUBIN mg/dL  --   --  0 95   ALK PHOS U/L  --   --  53   ALT U/L  --   --  22   AST U/L  --   --  31   GLUCOSE RANDOM mg/dL 111   < > 93    < > = values in this interval not displayed  Lines/Drains:  Invasive Devices  Report    Peripheral Intravenous Line  Duration           Peripheral IV 10/06/22 Dorsal (posterior); Right Forearm 3 days                      Imaging: Reviewed radiology reports from this admission including: chest xray    Recent Cultures (last 7 days):         Last 24 Hours Medication List:   Current Facility-Administered Medications   Medication Dose Route Frequency Provider Last Rate   • albuterol  2 puff Inhalation Q4H PRN Rosendale Po, MD     • amLODIPine  10 mg Oral Daily Rosendale Po, MD     • budesonide  0 5 mg Nebulization BID Tima Coleman MD     • busPIRone  10 mg Oral TID Rosendale Po, MD     • carbidopa-levodopa  1 tablet Oral TID Cornelio Po, MD     • chlorthalidone  25 mg Oral Daily Cornelio Po, MD     • cholestyramine sugar free  4 g Oral HS Rosendale Po, MD     • enoxaparin  40 mg Subcutaneous Daily Rosendale Po, MD     • finasteride  5 mg Oral QAM Cornelio Po, MD     • fluticasone  2 spray Nasal Daily Vinny Lowe MD     • gabapentin  300 mg Oral TID Vinny Lowe MD     • ipratropium  0 5 mg Nebulization TID Shahrira Blanton MD     • levalbuterol  1 25 mg Nebulization Q6H PRN Shahriar Blanton MD     • midodrine  10 mg Oral TID PRN Vinny Lowe MD     • mirtazapine  30 mg Oral HS Vinny Lowe MD     • pantoprazole  20 mg Oral BID AC Vinny Lowe MD     • potassium chloride  40 mEq Oral BID Tatyana Kwan MD     • predniSONE  10 mg Oral Daily Gerda Fam, DO     • roflumilast  500 mcg Oral Daily Gerda Fam, DO     • tamsulosin  0 4 mg Oral Daily Vinny Lowe MD     • traZODone  100 mg Oral HS Vinny Lowe MD          Today, Patient Was Seen By: Marbella Walsh    **Please Note: This note may have been constructed using a voice recognition system  **

## 2022-10-10 VITALS
OXYGEN SATURATION: 98 % | HEART RATE: 63 BPM | RESPIRATION RATE: 16 BRPM | DIASTOLIC BLOOD PRESSURE: 89 MMHG | SYSTOLIC BLOOD PRESSURE: 141 MMHG | TEMPERATURE: 97.7 F

## 2022-10-10 LAB
ANION GAP SERPL CALCULATED.3IONS-SCNC: 9 MMOL/L (ref 4–13)
BUN SERPL-MCNC: 31 MG/DL (ref 5–25)
CALCIUM SERPL-MCNC: 8.9 MG/DL (ref 8.4–10.2)
CHLORIDE SERPL-SCNC: 96 MMOL/L (ref 96–108)
CO2 SERPL-SCNC: 30 MMOL/L (ref 21–32)
CREAT SERPL-MCNC: 0.82 MG/DL (ref 0.6–1.3)
ERYTHROCYTE [DISTWIDTH] IN BLOOD BY AUTOMATED COUNT: 14.9 % (ref 11.6–15.1)
GFR SERPL CREATININE-BSD FRML MDRD: 84 ML/MIN/1.73SQ M
GLUCOSE SERPL-MCNC: 101 MG/DL (ref 65–140)
HCT VFR BLD AUTO: 34.4 % (ref 36.5–49.3)
HGB BLD-MCNC: 11.9 G/DL (ref 12–17)
MCH RBC QN AUTO: 32.9 PG (ref 26.8–34.3)
MCHC RBC AUTO-ENTMCNC: 34.6 G/DL (ref 31.4–37.4)
MCV RBC AUTO: 95 FL (ref 82–98)
PLATELET # BLD AUTO: 98 THOUSANDS/UL (ref 149–390)
PMV BLD AUTO: 10.2 FL (ref 8.9–12.7)
POTASSIUM SERPL-SCNC: 3.5 MMOL/L (ref 3.5–5.3)
RBC # BLD AUTO: 3.62 MILLION/UL (ref 3.88–5.62)
SODIUM SERPL-SCNC: 135 MMOL/L (ref 135–147)
WBC # BLD AUTO: 9.52 THOUSAND/UL (ref 4.31–10.16)

## 2022-10-10 PROCEDURE — 99239 HOSP IP/OBS DSCHRG MGMT >30: CPT | Performed by: HOSPITALIST

## 2022-10-10 PROCEDURE — 80048 BASIC METABOLIC PNL TOTAL CA: CPT

## 2022-10-10 PROCEDURE — 94760 N-INVAS EAR/PLS OXIMETRY 1: CPT

## 2022-10-10 PROCEDURE — 85027 COMPLETE CBC AUTOMATED: CPT

## 2022-10-10 PROCEDURE — 94640 AIRWAY INHALATION TREATMENT: CPT

## 2022-10-10 PROCEDURE — 97116 GAIT TRAINING THERAPY: CPT

## 2022-10-10 PROCEDURE — 99232 SBSQ HOSP IP/OBS MODERATE 35: CPT | Performed by: INTERNAL MEDICINE

## 2022-10-10 PROCEDURE — 97163 PT EVAL HIGH COMPLEX 45 MIN: CPT

## 2022-10-10 RX ORDER — LEVALBUTEROL 1.25 MG/.5ML
1.25 SOLUTION, CONCENTRATE RESPIRATORY (INHALATION)
Status: DISCONTINUED | OUTPATIENT
Start: 2022-10-10 | End: 2022-10-10 | Stop reason: HOSPADM

## 2022-10-10 RX ORDER — PREDNISONE 10 MG/1
10 TABLET ORAL DAILY
Qty: 30 TABLET | Refills: 0
Start: 2022-10-11 | End: 2022-11-10

## 2022-10-10 RX ORDER — FORMOTEROL FUMARATE 20 UG/2ML
20 SOLUTION RESPIRATORY (INHALATION)
Refills: 0
Start: 2022-10-10

## 2022-10-10 RX ORDER — FORMOTEROL FUMARATE 20 UG/2ML
20 SOLUTION RESPIRATORY (INHALATION)
Status: DISCONTINUED | OUTPATIENT
Start: 2022-10-10 | End: 2022-10-10 | Stop reason: HOSPADM

## 2022-10-10 RX ORDER — LEVALBUTEROL 1.25 MG/.5ML
1.25 SOLUTION, CONCENTRATE RESPIRATORY (INHALATION)
Refills: 0
Start: 2022-10-10

## 2022-10-10 RX ADMIN — FINASTERIDE 5 MG: 5 TABLET, FILM COATED ORAL at 08:24

## 2022-10-10 RX ADMIN — ROFLUMILAST 500 MCG: 250 TABLET ORAL at 08:25

## 2022-10-10 RX ADMIN — BUSPIRONE HYDROCHLORIDE 10 MG: 5 TABLET ORAL at 08:24

## 2022-10-10 RX ADMIN — GABAPENTIN 300 MG: 300 CAPSULE ORAL at 17:21

## 2022-10-10 RX ADMIN — CARBIDOPA AND LEVODOPA 1 TABLET: 25; 100 TABLET ORAL at 08:24

## 2022-10-10 RX ADMIN — IPRATROPIUM BROMIDE 0.5 MG: 0.5 SOLUTION RESPIRATORY (INHALATION) at 07:03

## 2022-10-10 RX ADMIN — IPRATROPIUM BROMIDE 0.5 MG: 0.5 SOLUTION RESPIRATORY (INHALATION) at 13:27

## 2022-10-10 RX ADMIN — AMLODIPINE BESYLATE 10 MG: 10 TABLET ORAL at 08:24

## 2022-10-10 RX ADMIN — ENOXAPARIN SODIUM 40 MG: 40 INJECTION SUBCUTANEOUS at 08:23

## 2022-10-10 RX ADMIN — CHLORTHALIDONE 25 MG: 25 TABLET ORAL at 08:24

## 2022-10-10 RX ADMIN — GABAPENTIN 300 MG: 300 CAPSULE ORAL at 08:24

## 2022-10-10 RX ADMIN — PREDNISONE 10 MG: 10 TABLET ORAL at 08:24

## 2022-10-10 RX ADMIN — PANTOPRAZOLE SODIUM 20 MG: 20 TABLET, DELAYED RELEASE ORAL at 17:21

## 2022-10-10 RX ADMIN — CARBIDOPA AND LEVODOPA 1 TABLET: 25; 100 TABLET ORAL at 17:21

## 2022-10-10 RX ADMIN — FLUTICASONE PROPIONATE 2 SPRAY: 50 SPRAY, METERED NASAL at 08:25

## 2022-10-10 RX ADMIN — PANTOPRAZOLE SODIUM 20 MG: 20 TABLET, DELAYED RELEASE ORAL at 05:34

## 2022-10-10 RX ADMIN — BUSPIRONE HYDROCHLORIDE 10 MG: 5 TABLET ORAL at 17:21

## 2022-10-10 RX ADMIN — BUDESONIDE 0.5 MG: 0.5 INHALANT ORAL at 07:03

## 2022-10-10 RX ADMIN — TAMSULOSIN HYDROCHLORIDE 0.4 MG: 0.4 CAPSULE ORAL at 08:24

## 2022-10-10 RX ADMIN — LEVALBUTEROL HYDROCHLORIDE 1.25 MG: 1.25 SOLUTION, CONCENTRATE RESPIRATORY (INHALATION) at 13:27

## 2022-10-10 NOTE — ASSESSMENT & PLAN NOTE
· Palliative Care saw the patient on September 12 and a completed 5 wishes document with the patient where he named his daughter Jayden Li as healthcare agent  · Patient states today that he wants level 1 full code --> Patient transitioned to Level 3 as of 10/6/22

## 2022-10-10 NOTE — SOCIAL WORK
Palliative LSW saw patient at the bedside today  LSW appreciates the opportunity to provide patient/family with inpatient emotional support and guidance while patient continues to receive medical attention from the medical team      Topics discussed: Met with pt at bedside for continued support  Pt discussed he is feeling "about the same" today and also feels his breathing is about the same  He discussed he was told he may be d/c today, but he feels a bit uncertain whether he is ready or not  Pt has been receiving STR at [facility] and he plans to return to finish out 1 more week of rehab  He feels he was able to make a little bit of progress there, and is hopeful with use of w/c and help at home he would be able to manage  Pt anticipates [employer] [facility] will assist with coordinating help at home for him  LSW reviewed Kieran Ambrosio vs HHA services  Pt does not feel he has the financial means to pay OOP for HHA  Waiver program also discussed  Pt's biggest fear is needing to stay in a NH long-term  Emotional support provided  Pt denies any further questions/concerns at this time  Areas that need follow-up: Emotional Support   Resources given: None  Others present: None      I have spent 20 minutes with Patient today in which greater than 50% of this time was spent in counseling/coordination of care regarding Emotional Support         LSW will continue to follow as requested by the medical team, patient, or family

## 2022-10-10 NOTE — ASSESSMENT & PLAN NOTE
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  · Chest x-ray shows background emphysema, no acute pathology noted  · This is likely due to deconditioning from the respiratory fatigue  · Sats trending high 90s on 2 5L/min O2    Plan:  · Will resume home inhalers and home medications - Budesonide/Perforomist b i d , Ventolin 2 puffs Q 4 p r n , Xopenex/Atrovent t i d , Daliresp 500 mg daily, prednisone 10 mg  · Pulmonary consult recs:    - Currently on home O2 requirement of 3 L-97%        - Maintain saturations greater than 88%        - Pulmonary toilet:  Deep breathing cough, OOB as tolerated, IS Q 1 hr       - Cont  Budesonide/Perforomist b i d , Xopenex/Atrovent t i d        - D/c IV steroids and start prednisone 10 mg daily  · Palliative consult --> Recommendation appreciated    · Await PT/OT

## 2022-10-10 NOTE — CASE MANAGEMENT
Case Management Discharge Planning Note    Patient name Kathia Sweet  Location W /W -45 MRN [id number]  : 1945 Date 10/10/2022       Current Admission Date: 10/5/2022  Current Admission Diagnosis:Acute on chronic respiratory failure [facility])   Patient Active Problem List    Diagnosis Date Noted   • Electrolyte abnormality 10/09/2022   • Goals of care, counseling/discussion 10/05/2022   • SIRS (systemic inflammatory response syndrome) ([address] ) 2022   • Lactic acidosis 2022   • Panlobular emphysema ([address] )    • H/O cardiac catheterization 2022   • Chest heaviness 2022   • Hypertensive urgency 2022   • Cognitive impairment 2021   • Parkinson disease ([address] ) 2021   • Anxiety 2021   • Vitamin D deficiency disease 2021   • Avascular necrosis of hip, right ([address] ) 2021   • Depression, recurrent ([address] ) 2021   • Orthostatic hypotension with spine hypertension 2020   • Syncope 2020   • COPD with acute exacerbation ([address] ) 2019   • Ambulatory dysfunction 2019   • Insomnia 2019   • Chronic venous insufficiency 2019   • 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 ([address] ) 2019   • Centrilobular emphysema ([facility] ) 2018   • CLAYTON (acute kidney injury) ([facility] ) 2018   • Acute on chronic respiratory failure ([address] ) 2018   • Chronic pulmonary embolism ([address] ) 2018   • Former smoker 2018   • Liver disease    • Alpha-1-antitrypsin deficiency ([address] ) 2017   • Gastroesophageal reflux disease 2017   • Essential hypertension 2017   • BPH (benign prostatic hyperplasia) 2017   • Peripheral neuropathy 2017   • Allergic rhinitis 2016   • Cataracts, both eyes 2015   • Chronic diarrhea  2014   • Benign colon polyp 2014      LOS (days): 3  Geometric Mean LOS (GMLOS) (days): 3 50  Days to GMLOS:0 2     OBJECTIVE:  Risk of Unplanned Readmission Score: 42 1 Current admission status: Inpatient   Preferred Pharmacy:   [facility], [place], operated by [id number] - [address] [facility], [place] - [address]  [address] [facility] [address]  Phone: [phone] Fax: [phone]    [address] [facility] [address]  [address] [place] [address]  Phone: [phone] Fax: [phone]    [address], [address]  [address] [facility] [address]  Phone: [phone] Fax: [phone]    Primary Care Provider: Farrah Jones MD    Primary Insurance: MEDICARE  Secondary Insurance: [facility]    DISCHARGE DETAILS:         Other Referral/Resources/Interventions Provided:  Interventions: Short Term Rehab  Referral Comments: CM informed patient, daughter, Canelo Beckett that he's medically ready for discharge and will return back to [facility]  CM requested a 4:30   Would you like to participate in our [address] service program?  : No - Declined    Treatment Team Recommendation: Short Term Rehab  Discharge Destination Plan[de-identified] Short Term Rehab  Transport at Discharge : Wheelchair van  Dispatcher Contacted: Yes  Number/Name of Dispatcher: [employer]     ETA of Transport (Date): 10/10/22  ETA of Transport (Time): 7017      IMM Given (Date):: 10/10/22  IMM Given to[de-identified] Patient        Accepting Facility Name, [address] : [facility]  Receiving Facility/Agency Phone Number: [phone]  Facility/Agency Fax Number: [phone]         Jany Medina IMM reviewed with patient and caregiver, patient and caregiver agrees with discharge determination

## 2022-10-10 NOTE — PHYSICAL THERAPY NOTE
PHYSICAL THERAPY EVALUATION/TREATMENT     10/10/22 1780   Note Type   Note type Evaluation   Pain Assessment   Pain Assessment Tool 0-10   Pain Score No Pain   Restrictions/Precautions   Other Precautions O2;Fall Risk;Bed Alarm; Chair Alarm  (3 L O2 NC)   Home Living   Type of Home Other (Comment)  ([facility];STR)   Home Equipment   (RW)   Prior Function   Level of [employer] Independent with functional mobility; Needs assistance with ADLs  (Walks with RW and is followed with a wheelchair)   Lives With Facility staff   Receives Help From Personal care attendant   IADLs   (Needs assist for all IADLs)   Comments Pt ambulating with a RW at rehab distances of approximately 30 ft with a wheelchair follow   General   Additional Pertinent History Pt is admitted from short-term Rehab with generalized weakness and shortness of breath  Pt is on 3 L O2 nasal cannula at baseline   Family/Caregiver Present No   Cognition   Overall Cognitive Status WFL   Arousal/Participation Cooperative   Orientation Level Oriented X4   Following Commands Follows all commands and directions without difficulty   Subjective   Subjective “I do not know fall be able to walk I have been in bed since Thursday”   RLE Assessment   RLE Assessment WFL  (Hip 2+ to 3-/5, knee and ankle 3/5)   LLE Assessment   LLE Assessment WFL  (Hip 2+ to 3-/5, knee and ankle 3/5)   Bed Mobility   Supine to Sit 5  Supervision   Additional items Bedrails; Increased time required   Additional Comments Once seated edge of bed pt needed to rest for a few minutes prior to initiating transfers   Transfers   Sit to Stand 3  Moderate assistance   Additional items Assist x 1;Verbal cues; Increased time required   Stand to Sit 3  Moderate assistance   Additional items Assist x 1;Verbal cues; Increased time required   Additional Comments Pt stood with a RW and Min assist times 45 seconds and then returned to seated edge of bed to rest; pt moderately SOB with limited standing with a RW; SaO2 remained at 95-96% on 3 L O2 at rest and with activity; pt unable to take any steps   Balance   Static Sitting Good   Static Standing Fair -   Activity Tolerance   Activity Tolerance Patient limited by fatigue;Treatment limited secondary to medical complications (Comment)  (Moderate shortness of breath)   Assessment   Problem List Decreased strength;Decreased range of motion;Decreased endurance; Impaired balance;Decreased mobility; Decreased safety awareness   Assessment Patient seen for Physical Therapy evaluation  Patient admitted with Acute on chronic respiratory failure ([facility] )  Comorbidities affecting patient's physical performance include:  Chronic PE, Parkinson's, essential hypertension, BPH, liver disease, CLAYTON, emphysema, chronic venous insufficiency, ambulatory dysfunction, cognitive impairment, orthostatic hypotension with supine hypertension  Personal factors affecting patient at time of initial evaluation include: ambulating with assistive device, inability to ambulate household distances, inability to navigate community distances, inability to navigate level surfaces without external assistance, inability to perform dynamic tasks in community, anxiety and depression  Prior to admission, patient was requiring assist for functional mobility with RW, requiring assist for ADLS, requiring assist for IADLS and in short term rehab  Please find objective findings from Physical Therapy assessment regarding body systems outlined above with impairments and limitations including weakness, decreased ROM, impaired balance, decreased endurance, gait deviations, decreased activity tolerance, decreased functional mobility tolerance, decreased safety awareness, fall risk and SOB upon exertion  The Barthel Index was used as a functional outcome tool presenting with a score of Barthel Index Score: 35 today indicating marked limitations of functional mobility and ADLS    Patient's clinical presentation is currently unstable/unpredictable as seen in patient's presentation of vital sign response, varying levels of cognitive performance, increased fall risk, new onset of impairment of functional mobility, decreased endurance and new onset of weakness  Pt would benefit from continued Physical Therapy treatment to address deficits as defined above and maximize level of functional mobility  As demonstrated by objective findings, the assigned level of complexity for this evaluation is high  The patient's [facility] Basic Mobility Inpatient Short Form Raw Score is 11  A Raw score of less than or equal to 16 suggests the patient may benefit from discharge to post-acute rehabilitation services  Please also refer to the recommendation of the Physical Therapist for safe discharge planning  Goals   Patient Goals To get stronger and be able to walk again   STG Expiration Date 10/20/22   Short Term Goal #1 Patient will: Increase bilateral LE strength 1/2 grade to facilitate independent mobility, Perform all bed mobility tasks independently to decrease fall risk factors, Perform all transfers w/ minx1 to improve independence, Ambulate at least 15 ft w/ RW w/o SpO2 decreasing below 90%, Increase ambulatory balance 1 grade to decrease risk for falls and Tolerate 3 hr OOB to faciliate upright tolerance   Plan   Treatment/Interventions ADL retraining;Functional transfer training;LE strengthening/ROM; Therapeutic exercise; Endurance training;Patient/family training;Equipment eval/education; Bed mobility;Gait training; Compensatory technique education   PT Frequency 3-5x/wk   Recommendation   PT Discharge Recommendation Post acute rehabilitation services   Equipment Recommended   (Pt states he has a RW)   AM-PAC Basic Mobility Inpatient   Turning in Bed Without Bedrails 3   Lying on Back to Sitting on Edge of Flat Bed 2   Moving Bed to Chair 2   Standing Up From Chair 2   Walk in Room 1   Climb 3-5 Stairs 1   Basic Mobility Inpatient Raw Score 11   Basic Mobility Standardized Score 30 25   Highest Level Of Mobility   -HL Goal 4: Move to chair/commode   JH-HLM Achieved 4: Move to chair/commode   Barthel Index   Feeding 10   Bathing 0   Grooming Score 0   Dressing Score 5   Bladder Score 5   Bowels Score 5   Toilet Use Score 5   Transfers (Bed/Chair) Score 5   Mobility (Level Surface) Score 0   Stairs Score 0   Barthel Index Score 35   Additional Treatment Session   Start Time 1345   End Time 1355   Treatment Assessment S:  “I have just gotten so weak ”  O:  Pt transfers sit to stand with Min/mod assist of 1 with the bed significantly raised and ambulates 4-5 steps with a RW and mod assist of 1 from bed to chair  Pt transfers stand to sit with mod assist of 1  Pt attempts to sit when he is too far away from the chair needing verbal/tactile cuing for safety  Once seated in the chair pt noted to be moderately SOB needing increased recovery time  A:  Pt will continue to benefit from skilled physical therapy services to increase his active range of motion and strength, bed mobility, transfers, gait with a RW and overall functional mobility  Pt is primarily limited by his respiratory status  Pt is appropriate for post acute rehab services when medically stable for discharge  End of Consult   Patient Position at End of Consult Bed/Chair alarm activated; Bedside chair; All needs within reach   [facility] Insurance Number  Galina Wolf PT [id number]     Portions of the documentation may have been created using voice recognition software  Occasional wrong word or sound alike substitutions may have occurred due to the inherent limitation of the voice recognition software  Read the chart carefully and recognize, using context, where substitutions have occurred

## 2022-10-10 NOTE — DISCHARGE SUMMARY
[facility]  Discharge- [place] Ernie 1945, 65 y o  male MRN: [id number]  Unit/Bed#: W -01 Encounter: [id number]  Primary Care Provider: Bhupendra Walsh MD   Date and time admitted to hospital: 10/5/2022 12:04 PM    * Acute on chronic respiratory failure ([facility] [address] )  Assessment & Plan  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  · Chest x-ray shows background emphysema, no acute pathology noted  · This is likely due to deconditioning from the respiratory fatigue  · Sats trending high 90s on 2 5L/min O2    Plan:  · Will resume home inhalers and home medications - Budesonide/Perforomist b i d , Ventolin 2 puffs Q 4 p r n , Xopenex/Atrovent t i d , Daliresp 500 mg daily, prednisone 10 mg  · Pulmonary consult recs:    - Currently on home O2 requirement of 3 L-97%        - Maintain saturations greater than 88%        - Pulmonary toilet:  Deep breathing cough, OOB as tolerated, IS Q 1 hr       - Cont  Budesonide/Perforomist b i d , Xopenex/Atrovent t i d        - D/c IV steroids and start prednisone 10 mg daily  · Palliative consult --> Recommendation appreciated    · Await PT/OT    Alpha-1-antitrypsin deficiency [facility])  Assessment & Plan  · Patient has a history of alpha-1 antitrypsin deficiency  · On home Zemaira infusion per pulmonology  · Follows with Pulmonary as outpatient    Plan:   · See plan under acute on chronic respiratory failure  · Prednisone 10 mg daily, per pulm rec  · Reorder labs  · Follow up w/ Pulm outpatient    Electrolyte abnormality  Assessment & Plan  Assessment:  Patient was found to have a K+ of 3 4 on BMP (10/9)    Plan:  - KCL 40 mEq bid (on 10/9)    Goals of care, counseling/discussion  Assessment & Plan  · Palliative Care saw the patient on September 12 and a completed 5 wishes document with the patient where he named his daughter Lyman Cranker as healthcare agent  · Patient states today that he wants level 1 full code --> Patient transitioned to Level 3 as of 10/6/22  Parkinson disease [facility])  Assessment & Plan  Patient with history of Parkinson's, on Sinemet    Plan:  - Continue Sinemet    Chronic pulmonary embolism ([address] )  Assessment & Plan  · Per chart review, patient completed a 6 month course of Eliquis for a PE per Oncology in 2018  CT chest September 7th shows chronic left-sided PE  · YOBANY on September 12 negative for [place], 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    EF 70%    Plan:  - Continue to monitor  - If pt develops another acute PE then will need lifelong anticoagulation        Medical Problems             Resolved Problems  Date Reviewed: 10/10/2022          Resolved    Palliative care patient 10/5/2022     Resolved by  Christianne Moscoso MD    Hyponatremia 10/9/2022     Resolved by  Christianne Moscoso MD              Discharging Resident: Suhail Alvarez  Discharging Attending: Kimberly Ludwig MD  PCP: Mayco Garvin MD  Admission Date:   Admission Orders (From admission, onward)     Ordered        10/07/22 0742  Inpatient Admission  Once            10/05/22 1613  Place in Observation  Once                      Discharge Date: 10/10/22    Consultations During Hospital Stay:  · Pulmonology   · Palliative Care  · PT    Procedures Performed:   · None    Significant Findings / Test Results:   XR chest 1 view portable   Final Result by Catalina Mann MD (10/05 3383)      No acute cardiopulmonary disease  Background emphysema and scarring  Workstation performed: [id number]           ·     Incidental Findings:   · None    Test Results Pending at Discharge (will require follow up): · None     Outpatient Tests Requested:  · None    Complications:  None    Reason for Admission: SOB    Hospital Course:   Carleen Esquivel is a 65 y o  male patient who originally presented to the hospital on 10/5/2022 due to 2 day history of worsening shortness of breath, generalized weakness, lightheadedness, nausea, stomach upset  Patient has a PMH of alpha-1 antitrypsin deficiency, COPD, CAD, basal cell carcinoma of the neck and face, CKD stage 2, Parkinson's, BPH, and PE status post completion of 6 months of Eliquis  A chest x-ray in the ED showed no acute cardiopulmonary disease  After being admitted to [facility] for further management, patient was seen by pulmonology who resumed the patient's home regimen  Patient was improving day by day but still was feeling weak and tired due to the alpha-1 antitrypsin deficiency that he has  Patient unable to get his infusions in the hospital but as per pulmonology, the infusions are only maintaining him and not really curing the disease  Patient is to be discharged to pulmonary rehab  Patient will also follow up outpatient with pulmonology upon discharge  Palliative care was also consulted during this admission for the patient  Patient's daughter was also called to discuss patient's code level  After an extensive decision with the daughter, the patient, and the primary care team, the patient decided he would like to be the Level 3 - DNR and DNI  Patient is set to follow up with palliative care outpatient upon discharge  Of note, after rehab, the patient is refusing to go to a nursing facility   He would like to live his life on his terms and "order a pizza whenever he wants pizza " The patient and palliative care will revisit the patient's living options after patient completes rehab  The following medications were added/adjusted to the patient's regimen:  · Daliresp dose changed to 500 mcg daily  · Continue home meds: Xoponex, Atrovent, Performist, Pulmicort, and Prednisone    Patient to follow-up with pulmonology, palliative care, and PCP  Please see above list of diagnoses and related plan for additional information  Condition at Discharge: stable    Discharge Day Visit / Exam:   Subjective:  Patient is a 28-year-old male who presented to the ED for shortness of breath  Patient was admitted to [address] [place] [address] for further management  Today, patient states that he is feeling weak and tired but otherwise has no other complaints  There were no acute events overnight  Vitals: Blood Pressure: 141/89 (10/10/22 0706)  Pulse: 63 (10/10/22 0706)  Temperature: 97 7 °F (36 5 °C) (10/10/22 0706)  Temp Source: Oral (10/07/22 2138)  Respirations: 16 (10/10/22 0706)  SpO2: 98 % (10/10/22 0826)    Exam:   Physical Exam  Vitals reviewed  HENT:      Head: Normocephalic and atraumatic  Mouth/Throat:      Mouth: Mucous membranes are dry  Eyes:      Extraocular Movements: Extraocular movements intact  Conjunctiva/sclera: Conjunctivae normal       Pupils: Pupils are equal, round, and reactive to light  Cardiovascular:      Rate and Rhythm: Normal rate and regular rhythm  Pulses: Normal pulses  Heart sounds: Normal heart sounds  Pulmonary:      Effort: Respiratory distress present  Breath sounds: No wheezing or rhonchi  Abdominal:      General: Abdomen is flat  Palpations: Abdomen is soft  Musculoskeletal:         General: Normal range of motion  Skin:     General: Skin is warm  Neurological:      General: No focal deficit present  Mental Status: He is alert  Mental status is at baseline  Psychiatric:         Mood and Affect: Mood normal          Behavior: Behavior normal          Thought Content:  Thought content normal  Judgment: Judgment normal        Discussion with Family: Updated  (daughter) via phone  Discharge instructions/Information to patient and family:   See after visit summary for information provided to patient and family  Provisions for Follow-Up Care:  See after visit summary for information related to follow-up care and any pertinent home health orders  Disposition:   Acute Rehab at [facility]  Planned Readmission: None    Discharge Medications:  See after visit summary for reconciled discharge medications provided to patient and/or family        **Please Note: This note may have been constructed using a voice recognition system**

## 2022-10-10 NOTE — PROGRESS NOTES
Progress Note - Pulmonary   Isreal Afua 65 y o  male MRN: [id number]  Unit/Bed#: W -01 Encounter: [id number]      Assessment/Plan:    1  Acute pulmonary insufficiency on chronic hypoxic respiratory failure  1  Baseline 3 L NC-currently at baseline requirements  2  Suspect acute pulmonary insufficiency secondary to severe deconditioning with severe underlying COPD  3  Aggressive pulmonary toilet: IS, cough deep breathing, OOB and PT/OT needed  2  Severe COPD without acute exacerbation  1  Continue home regimen of budesonide/performist BID with xopenx/atrovent TID while hospitalized  2  Home regimen: budeonside/perforomist BID, xopenex/atrovent TID, daliresp 500mg daily, prednisone 10 mg daily  3  Alpha 1 antitrypsin deficiency  1  Missed 2 months of therapy due to frequent hospitalizations  2  Will need to bring medication from home to either our facility or rehab in order to receive-likely not cause of current decline   4  History of chronic PE  1  No change in regimen now  5  Chronic diastolic heart failure  1  Appears euvolemic  2  Management per IM    We will sig off, call with questions        Subjective:     Abelardo was seen in bed upon entering the room  Reports his breathing is at baseline and denies acute complaints  Currently is residing at rehab and wishes to return to build up his strength  Denies: chest pain, fevers, chills, cough or sputum production  Objective:         Vitals: Blood pressure 141/89, pulse 63, temperature 97 7 °F (36 5 °C), resp  rate 16, SpO2 98 %  , 3L NC, There is no height or weight on file to calculate BMI        Intake/Output Summary (Last 24 hours) at 10/10/2022 0950  Last data filed at 10/10/2022 0843  Gross per 24 hour   Intake 240 ml   Output 1425 ml   Net -1185 ml         Physical Exam  Gen: Awake, alert, oriented x 3, no acute distress  HEENT: Mucous membranes moist, no oral lesions, no thrush  NECK: no accessory muscle use, JVP not elevated  Cardiac: Regular, single S1, single S2, no murmurs, no rubs, no gallops  Lungs: distant clear breath soudns  Abdomen: normoactive bowel sounds, soft nontender, nondistended, no rebound or rigidity, no guarding  Extremities: no cyanosis, no clubbing, no edema    Labs: I have personally reviewed pertinent lab results  , CBC:   Lab Results   Component Value Date    WBC 9 52 10/10/2022    HGB 11 9 (L) 10/10/2022    HCT 34 4 (L) 10/10/2022    MCV 95 10/10/2022    PLT 98 (L) 10/10/2022    MCH 32 9 10/10/2022    MCHC 34 6 10/10/2022    RDW 14 9 10/10/2022    MPV 10 2 10/10/2022   , CMP:   Lab Results   Component Value Date    SODIUM 135 10/10/2022    K 3 5 10/10/2022    CL 96 10/10/2022    CO2 30 10/10/2022    BUN 31 (H) 10/10/2022    CREATININE 0 82 10/10/2022    CALCIUM 8 9 10/10/2022    EGFR 84 10/10/2022     Imaging and other studies: none      Halle Becerra

## 2022-10-10 NOTE — PLAN OF CARE
Problem: PHYSICAL THERAPY ADULT  Goal: Performs mobility at highest level of function for planned discharge setting  See evaluation for individualized goals  Description: Treatment/Interventions: ADL retraining, Functional transfer training, LE strengthening/ROM, Therapeutic exercise, Endurance training, Patient/family training, Equipment eval/education, Bed mobility, Gait training, Compensatory technique education  Equipment Recommended:  (Pt states he has a RW)       See flowsheet documentation for full assessment, interventions and recommendations  Note:    Problem List: Decreased strength, Decreased range of motion, Decreased endurance, Impaired balance, Decreased mobility, Decreased safety awareness  Assessment: Patient seen for Physical Therapy evaluation  [facility]  Comorbidities affecting patient's physical performance include:  Chronic PE, Parkinson's, essential hypertension, BPH, liver disease, [place], emphysema, chronic venous insufficiency, ambulatory dysfunction, cognitive impairment, orthostatic hypotension with supine hypertension  Personal factors affecting patient at time of initial evaluation include: ambulating with assistive device, inability to ambulate household distances, inability to navigate community distances, inability to navigate level surfaces without external assistance, inability to perform dynamic tasks in community, anxiety and depression  Prior to admission, patient was requiring assist for functional mobility with RW, requiring assist for ADLS, requiring assist for IADLS and in short term rehab    Please find objective findings from Physical Therapy assessment regarding body systems outlined above with impairments and limitations including weakness, decreased ROM, impaired balance, decreased endurance, gait deviations, decreased activity tolerance, decreased functional mobility tolerance, decreased safety awareness, fall risk and SOB upon exertion  The Barthel Index was used as a functional outcome tool presenting with a score of Barthel Index Score: 35 today indicating marked limitations of functional mobility and ADLS  Patient's clinical presentation is currently unstable/unpredictable as seen in patient's presentation of vital sign response, varying levels of cognitive performance, increased fall risk, new onset of impairment of functional mobility, decreased endurance and new onset of weakness  Pt would benefit from continued Physical Therapy treatment to address deficits as defined above and maximize level of functional mobility  As demonstrated by objective findings, the assigned level of complexity for this evaluation is high  The patient's AM-[facility] Basic Mobility Inpatient Short Form Raw Score is 11  A Raw score of less than or equal to 16 suggests the patient may benefit from discharge to post-acute rehabilitation services  Please also refer to the recommendation of the Physical Therapist for safe discharge planning  PT Discharge Recommendation: Post acute rehabilitation services    See flowsheet documentation for full assessment

## 2022-10-11 ENCOUNTER — TRANSITIONAL CARE MANAGEMENT (OUTPATIENT)
Dept: FAMILY MEDICINE CLINIC | Facility: CLINIC | Age: 77
End: 2022-10-11

## 2022-10-18 ENCOUNTER — PATIENT OUTREACH (OUTPATIENT)
Dept: FAMILY MEDICINE CLINIC | Facility: CLINIC | Age: 77
End: 2022-10-18

## 2022-10-18 NOTE — PROGRESS NOTES
ADT alert received  Pt discharged to [facility] at [facility]  Plan is for patient to continue with his rehab stay

## 2022-11-03 ENCOUNTER — PATIENT OUTREACH (OUTPATIENT)
Dept: FAMILY MEDICINE CLINIC | Facility: CLINIC | Age: 77
End: 2022-11-03

## 2022-11-03 NOTE — PROGRESS NOTES
Received call from [address] Pt advised that he was on hospice care but could not provide Horace with any additional information  Outreach to [employer]  LVM for SW  Requested return call  RN CM contact information provided  Second outreach to [facility], requested return call  RN CM contact information provided  Received VM from Arsalan at [facility]  [employer]  Advised that patient was discharged on to hospice services with [facility]  Outreach to [facility]  Advised that patient was on hospice and has an around the clock caregiver  It is believed that patient is no longer receiving infusions for his alpha-1-antitripsin deficiency since he is now on hospice         TT message sent to [facility] and Marcela Francis advising of above

## 2022-11-30 ENCOUNTER — PATIENT OUTREACH (OUTPATIENT)
Dept: FAMILY MEDICINE CLINIC | Facility: CLINIC | Age: 77
End: 2022-11-30

## 2022-11-30 NOTE — PROGRESS NOTES
Outreach to [place]  Attempted to gather information regarding status of patient  [facility] or any  providers were listed on his chart  RN CM was advised that patient was discharged from services on   Pt is   Chart flagged as   Episode closed

## 2023-01-01 NOTE — ASSESSMENT & PLAN NOTE
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

## 2023-11-10 NOTE — ASSESSMENT & PLAN NOTE
[address]    Patient Name: Marc Burden     Patient : 1949  Room/Bed: [facility]  Admission Date/Time: 11/10/2023  1:41 PM  Primary Care Physician: Yelena Goss DO    Consulting Provider: Dr. Heaven Jonas  Reason for Consult: Vaginal cuff dehiscence     CC:   Chief Complaint   Patient presents with    Post-op Problem                HPI: Marc Burden is a 75 y.o. female [id number] presents from [facility] gyn oncology office after finding of vaginal cuff dehiscence. She is post-op from an [employer] 10/2/23. 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 Patient presented to the office with dark red vaginal bleeding and spotting. At her 2 week post-operative check she noticed dark red vaginal spotting but her cuff was examined and intact. Patient denies N/V, fevers, chills. She denies recent intercourse. Patient's last menstrual period was 1996 (exact date). REVIEW OF SYSTEMS:   A minimum of an eleven point review of systems was completed.     Constitutional: negative fever, negative chills   HEENT: negative visual disturbances, negative headaches  Respiratory: negative dyspnea, negative cough  Cardiovascular: negative chest pain,  negative palpitations  Gastrointestinal: negative abdominal pain, negative RUQ pain, negative N/V, negative diarrhea, negative constipation  Genitourinary: negative dysuria, +vaginal bleeding  Dermatological: negative rash  Hematologic: negative bruising  Immunologic/Lymphatic: negative recent illness, negative recent sick contact  Musculoskeletal: negative back pain, negative myalgias, negative arthralgias  Neurological:  negative dizziness, negative weakness  Behavior/Psych: negative depression, negative anxiety    OBSTETRIC HISTORY:   OB History    Para Term  AB Living   3 3 3 0 0 0   SAB IAB Ectopic Molar Multiple Live Births   0 0 0 0 0 0      # Outcome Date GA Lbr Continue sinemet  Absolute 0.82 0.10 - 1.20 k/uL    Eosinophils Absolute 0.22 0.00 - 0.44 k/uL    Basophils Absolute 0.05 0.00 - 0.20 k/uL    Absolute Immature Granulocyte <0.03 0.00 - 0.30 k/uL   Urinalysis with Reflex to Culture    Specimen: Urine   Result Value Ref Range    Color, UA Orange (A) Yellow    Turbidity UA Clear Clear    Glucose, Ur NEGATIVE NEGATIVE mg/dL    Bilirubin Urine NEGATIVE NEGATIVE    Ketones, Urine NEGATIVE NEGATIVE mg/dL    Specific Gravity, UA 1.047 (H) 1.005 - 1.030    Urine Hgb LARGE (A) NEGATIVE    pH, UA 6.5 5.0 - 8.0    Protein, UA TRACE (A) NEGATIVE mg/dL    Urobilinogen, Urine Normal 0.0 - 1.0 EU/dL    Nitrite, Urine NEGATIVE NEGATIVE    Leukocyte Esterase, Urine MODERATE (A) NEGATIVE   Microscopic Urinalysis   Result Value Ref Range    WBC, UA TOO NUMEROUS TO COUNT 0 - 5 /HPF    RBC, UA 20 TO 50 0 - 4 /HPF    Casts UA  0 - 8 /LPF     0 TO 2 HYALINE Reference range defined for non-centrifuged specimen. Epithelial Cells UA None 0 - 5 /HPF    Bacteria, UA None None   TYPE AND SCREEN   Result Value Ref Range    Blood Bank Sample Expiration 11/13/2023,2359     Arm Band Number [id number]     ABO/Rh A POSITIVE     Antibody Screen NEGATIVE     Unit Number [id number]     Component Leukocyte Reduced Red Cell     Unit Divison 00     Dispense Status Blood Bank ALLOCATED     Transfusion Status OK TO TRANSFUSE     Crossmatch Result COMPATIBLE     Unit Number [id number]     Component Leukocyte Reduced Red Cell     Unit Divison 00     Dispense Status Blood Bank ALLOCATED     Transfusion Status OK TO TRANSFUSE     Crossmatch Result COMPATIBLE        DIAGNOSTICS:  No results found.     ASSESSMENT & PLAN:    Tammi Patterson is a 75 y.o. female [id number]    Vaginal Cuff Dehiscence   - Vaginal cuff dehiscence recognized in the office today    - + vaginal bleeding    - S/p RALH, BSO, SHIRLEY 10/2/23   - Plan for CT Ab/pelvis on admission    - UA, CBC, CMP    - Plan for EUA, Repair of vaginal cuff @ 1700   - OR staff

## 2024-01-16 NOTE — PROGRESS NOTES
[facility]  Progress Note - Kathia Sweet 1945, 65 y o  male MRN: [id number]  Unit/Bed#: S -01 Encounter: [id number]  Primary Care Provider: Radames Bales MD   Date and time admitted to hospital: 9/3/2022  1:55 PM    * COPD with acute exacerbation [facility])  Assessment & Plan  Patient returning to the hospital for another severe exacerbation of his COPD  Follows with  Pulmonary [facility]  Last admission 8/18 for exacerbation of COPD and was on tapered dose of prednisone and required azithromycin  Several readmissions in the past 6 months for COPD exacerbation  Workup for this admission shows no evidence of infection  WBC normal, chest x-ray no evidence of cardiopulmonary disease, COVID/flu/RSV negative    Plan; pulmonology recomendations appreciated      Will transition to prednisone tomorrow 40 mg decreased by 10 mg q 3 days    · Atrovent/Xopenex q i d  · Continue performist and Pulmicort  · Respiratory protocol, airway clearance, antitussives and incentive spirometry    · Appreciate Pulmonology recommendations  · Daliresp 500 mg daily   · Procal negative x2, No antibiotic requirements at this time  · Pulmonary rehab  · Palliative care consulted; [address] discussion given severe COPD with recurrent admissions       Acute on chronic respiratory failure ([facility] )  Assessment & Plan    · Chronically on 3 L oxygen secondary to centrilobular emphysema and alpha 1 antitrypsin deficiency  · Chest x-ray with no evidence of cardiopulmonary disease  · Monitor oxygen requirements and keep SpO2 over 88%  · Currently on 3-4 L NC  · Continue incentive spirometry  · Treatment for COPD exacerbation as above  · Pulmonology on board      Ambulatory dysfunction  Assessment & Plan  · Multifactorial due to age, Parkinson's disease, peripheral neuropathy, chronic medical conditions   · Recently discharged from rehab, lives at home by himself, uses a walker  · Fall precautions  · PT/OT    Lactic acidosis  Assessment & Plan  · Low suspicion for sepsis  · 2/2 increased respiratory muscle use, deconditioning  · Received fluids overnight  · Trend levels until normal  · Lactic acid trending down    SIRS (systemic inflammatory response syndrome) ([facility] )  Assessment & Plan  · Patient presented to the ED with tachycardia and tachypnea  · No fever, no evidence of infection at this time  · White blood cell normal, procalcitonin normal, blood cultures pending    Parkinson disease ([facility] )  Assessment & Plan  Continue sinemet    Depression, recurrent (HCC)  Assessment & Plan  Continue Buspar 10 mg tid and mirtazapine 30 mg    Chronic pulmonary embolism ([facility] )  Assessment & Plan  · Per chart review, patient completed 6-month course of Eliquis per oncology in 2018  If patient develops another acute PE then will need lifelong AC  · No need for systemic anticoagulation at this time      Essential hypertension  Assessment & Plan  Continue home medications  BP stable    Alpha-1-antitrypsin deficiency (HCC)  Assessment & Plan  · On Zemaira infusion per pulmonology  · Unable to get his infusions for the past 1 month because he was hospitalized and then had rehab           VTE Pharmacologic Prophylaxis: VTE Score: 11 High Risk (Score >/= 5) - Pharmacological DVT Prophylaxis Ordered: enoxaparin (Lovenox)  Sequential Compression Devices Ordered  Patient Centered Rounds: I performed bedside rounds with nursing staff today  Discussions with Specialists or Other Care Team Provider:  Pulmonology    Education and Discussions with Family / Patient: Attempted to update  (daughter) via phone  Unable to contact  Current Length of Stay: 4 day(s)  Current Patient Status: Inpatient   Discharge Plan: Anticipate discharge in 24-48 hrs to rehab facility  Code Status: Level 1 - Full Code    Subjective:   Patient patient seen this morning, no acute events overnight  Has no complaints   Patient is conscious alert continues  He is still short of 5 the rest but overall physical status much more improved  Continue his treatment, discharged plan in 24-48 hours for an acute care rehab facility a probe medical history  Objective:     Vitals:   Temp (24hrs), Av 9 °F (36 6 °C), Min:97 7 °F (36 5 °C), Max:98 1 °F (36 7 °C)    Temp:  [97 7 °F (36 5 °C)-98 1 °F (36 7 °C)] 97 7 °F (36 5 °C)  HR:  [77-81] 81  Resp:  [18] 18  BP: (120-148)/(70-81) 120/78  SpO2:  [95 %-98 %] 97 %  There is no height or weight on file to calculate BMI  Input and Output Summary (last 24 hours): Intake/Output Summary (Last 24 hours) at 2022 1345  Last data filed at 2022 2348  Gross per 24 hour   Intake --   Output 650 ml   Net -650 ml       Physical Exam:   Physical Exam  Constitutional:       Appearance: Normal appearance  HENT:      Head: Normocephalic  Mouth/Throat:      Mouth: Mucous membranes are moist    Cardiovascular:      Rate and Rhythm: Normal rate and regular rhythm  Pulses: Normal pulses  Pulmonary:      Effort: No respiratory distress  Abdominal:      General: Bowel sounds are normal       Palpations: Abdomen is soft  Tenderness: There is no abdominal tenderness  Musculoskeletal:         General: No swelling  Right lower leg: No edema  Left lower leg: No edema  Skin:     General: Skin is warm  Neurological:      General: No focal deficit present  Mental Status: He is alert and oriented to person, place, and time           Additional Data:     Labs:  Results from last 7 days   Lab Units 22  0610 22  0629   WBC Thousand/uL 10 12 10 29*   HEMOGLOBIN g/dL 13 2 11 9*   HEMATOCRIT % 37 7 34 8*   PLATELETS Thousands/uL 150 124*   BANDS PCT % 1  --    NEUTROS PCT %  --  85*   LYMPHS PCT %  --  10*   LYMPHO PCT % 18  --    MONOS PCT %  --  3*   MONO PCT % 3*  --    EOS PCT % 0 0     Results from last 7 days   Lab Units 22  0610 22  0426 22  1454   SODIUM mmol/L 139   < > 137 POTASSIUM mmol/L 4 3   < > 3 2*   CHLORIDE mmol/L 101   < > 97   CO2 mmol/L 28   < > 30   BUN mg/dL 29*   < > 29*   CREATININE mg/dL 0 90   < > 1 13   ANION GAP mmol/L 10   < > 10   CALCIUM mg/dL 9 3   < > 9 1   ALBUMIN g/dL  --   --  4 1   TOTAL BILIRUBIN mg/dL  --   --  1 33*   ALK PHOS U/L  --   --  61   ALT U/L  --   --  32   AST U/L  --   --  21   GLUCOSE RANDOM mg/dL 140   < > 142*    < > = values in this interval not displayed  Results from last 7 days   Lab Units 09/04/22  0426   INR  1 03             Results from last 7 days   Lab Units 09/06/22  0629 09/05/22  0932 09/04/22  1733 09/04/22  1357 09/04/22  0426 09/03/22  1658 09/03/22  1454   LACTIC ACID mmol/L 3 1* 6 0* 5 7* 5 1*  --    < > 2 8*   PROCALCITONIN ng/ml  --   --   --   --  0 13  --  0 15    < > = values in this interval not displayed  Lines/Drains:  Invasive Devices  Report    Peripheral Intravenous Line  Duration           Peripheral IV 09/05/22 Distal;Dorsal (posterior); Left Forearm 1 day    Peripheral IV 09/07/22 Left Antecubital <1 day                      Imaging: No pertinent imaging reviewed  Recent Cultures (last 7 days):   Results from last 7 days   Lab Units 09/03/22  1454   BLOOD CULTURE  No Growth at 72 hrs  No Growth at 72 hrs         Last 24 Hours Medication List:   Current Facility-Administered Medications   Medication Dose Route Frequency Provider Last Rate    albuterol  2 5 mg Nebulization Q4H PRN Chris Burgos MD      amLODIPine  10 mg Oral Daily Chris Burgos MD      budesonide  0 5 mg Nebulization Q12H Chris Burgos MD      busPIRone  10 mg Oral TID Chris Burgos MD      chlorthalidone  25 mg Oral Daily Chris Burgos MD      enoxaparin  40 mg Subcutaneous Daily Chris Burgos MD      finasteride  5 mg Oral QAM Caity To MD      formoterol  20 mcg Nebulization Q12H Caity Johnson MD Ignacio      gabapentin  300 mg Oral TID Mitul Amaro MD      ipratropium  0 5 mg Nebulization TID Kary Rodriguez MD      levalbuterol  1 25 mg Nebulization TID Kary Rodriguez MD      melatonin  3 mg Oral HS Cecilia Eden DO      methylPREDNISolone sodium succinate  40 mg Intravenous Q12H Teodoro Roblero MD      mirtazapine  30 mg Oral HS Kary Rodriguez MD      roflumilast  500 mcg Oral Daily Seth Worrell MD      tamsulosin  0 4 mg Oral Daily Kary Rodriguez MD          Today, Patient Was Seen By: Kary Rodriguez MD    **Please Note: This note may have been constructed using a voice recognition system  ** Calm

## 2024-02-19 NOTE — ED PROVIDER NOTES
History  Chief Complaint   Patient presents with    Weakness - Generalized     pt reports of worsening gen weakness, pt was here Sunday for similar complaints  pt reports he is unable to get around the house or ambulate       History provided by:  Patient   used: No        79-year-old male presents emergency department for further evaluation of generalized weakness  Patient was here roughly 7 days ago  He was seen by Physical therapy who recommended physical therapy  He was discharged home with outpatient physical therapy  He attended his session today were as noted of generalized weakness  He went home and was unable to stand up off the couch  Denies any falls or trauma  Reports he was unable to eat or drink and is concerned he is unable to care from self in his current living situation  Prior to Admission Medications   Prescriptions Last Dose Informant Patient Reported? Taking? Diclofenac Sodium (VOLTAREN) 1 %   No No   Sig: Apply 2 g topically 4 (four) times a day Apply to R knee   LORazepam (ATIVAN) 1 mg tablet  Self No No   Sig: TAKE ONE TABLET BY MOUTH TWICE DAILY    OLANZapine (ZyPREXA) 5 mg tablet   No No   Sig: Take 1 5 tablets (7 5 mg total) by mouth daily at bedtime   OXYGEN-HELIUM IN  Self Yes No   Sig: Inhale 2L at rest- 3L with activity   Ventolin  (90 Base) MCG/ACT inhaler  Self No No   Sig: Inhale 1 puff every 6 (six) hours as needed for shortness of breath   ZEMAIRA infusion  Self Yes No   Sig: once a week    acetaminophen (TYLENOL) 500 mg tablet   No No   Sig: Take 2 tablets (1,000 mg total) by mouth every 8 (eight) hours   budesonide (PULMICORT) 0 5 mg/2 mL nebulizer solution  Self No No   Sig: Take 1 vial (0 5 mg total) by nebulization 2 (two) times a day Rinse mouth after use     busPIRone (BUSPAR) 10 mg tablet  Self No No   Sig: Take 1 tablet (10 mg total) by mouth 3 (three) times a day   cholestyramine sugar free (QUESTRAN LIGHT) 4 g packet   No No   Sig: Patient is here with dad.      If any information or results need to be relayed from today's visit, the best way to contact the patient is via [phone] (mobile) - Patient gives verbal permission to leave a detailed voicemail at the number provided.       Take 1 packet (4 g total) by mouth 2 (two) times a day   doxylamine (Unisom SleepTabs) 25 MG tablet  Self Yes No   Sig: Take 25 mg by mouth daily at bedtime as needed for sleep   famotidine (PEPCID) 20 mg tablet  Self No No   Sig: Take 1 tablet (20 mg total) by mouth daily   finasteride (PROSCAR) 5 mg tablet  Self No No   Sig: Take 1 tablet (5 mg total) by mouth every morning   fluticasone (FLONASE) 50 mcg/act nasal spray  Self No No   Si sprays into each nostril daily   formoterol (Perforomist) 20 MCG/2ML nebulizer solution  Self No No   Sig: Take 1 vial (20 mcg total) by nebulization 2 (two) times a day   gabapentin (NEURONTIN) 300 mg capsule  Self No No   Sig: Take 1 capsule (300 mg total) by mouth 3 (three) times a day   guaiFENesin (ROBITUSSIN) 100 MG/5ML oral liquid  Self Yes No   Sig: Take 200 mg by mouth 2 (two) times a day   ipratropium-albuterol (DUO-NEB) 0 5-2 5 mg/3 mL nebulizer solution   No No   Sig: take 1 vial (3ml) by nebulization four times a day as needed    midodrine (PROAMATINE) 2 5 mg tablet  Self No No   Sig: Take 1 tablet (2 5 mg total) by mouth 3 (three) times a day   pantoprazole (PROTONIX) 40 mg tablet  Self No No   Sig: Take 1 tablet (40 mg total) by mouth 2 (two) times a day   tamsulosin (FLOMAX) 0 4 mg  Self No No   Sig: Half a tablet daily   tamsulosin (FLOMAX) 0 4 mg  Self Yes No   Sig: Take 0 4 mg by mouth daily   traZODone (DESYREL) 100 mg tablet   No No   Sig: Take 1 tablet (100 mg total) by mouth daily at bedtime      Facility-Administered Medications: None       Past Medical History:   Diagnosis Date    Anesthesia complication     Difficult to wake up    Arthritis     BPH (benign prostatic hyperplasia)     urinary frequency    Cancer (HCC)     basal cell neck, face    COPD exacerbation (HCC) 2019    Full dentures     Hiatal hernia     History of methicillin resistant staphylococcus aureus (MRSA)     10/11/2018 MRSA (nares) positive    Irritable bowel syndrome  Kidney stone     at least 7 episodes    Liver disease     Alpha 1- enzyme deficiency - diagnosed 2002  has been on weekly replacement therapy since then    Pulmonary emphysema ([address] )     1/25/15  FEV1 - 2 45 liters or 59% of predicted    RSV infection 12/2017    Wears glasses     for driving only       Past Surgical History:   Procedure Laterality Date    BACK SURGERY  2008    discectomy    COLONOSCOPY      COLONOSCOPY N/A 3/10/2017    Procedure: Gib Helm;  Surgeon: Reyna Salinas MD;  Location: [place] [address] GI LAB; Service:    [place]      removal of kidney stones    ESOPHAGOGASTRODUODENOSCOPY N/A 3/10/2017    Procedure: ESOPHAGOGASTRODUODENOSCOPY (EGD); Surgeon: Reyna Salinas MD;  Location: [facility] GI LAB; Service:     LITHOTRIPSY      MT ESOPHAGOGASTRODUODENOSCOPY TRANSORAL DIAGNOSTIC N/A 11/20/2018    Procedure: ESOPHAGOGASTRODUODENOSCOPY (EGD); Surgeon: Reyna Salinas MD;  Location: [facility] GI LAB; Service: Gastroenterology    TONSILLECTOMY      VEIN LIGATION AND STRIPPING Bilateral     17's       Family History   Problem Relation Age of Onset    Emphysema Mother         never smoked    Emphysema Father     Cancer Brother         colon    Colon cancer Brother     Ulcerative colitis Family     Liver disease Family      I have reviewed and agree with the history as documented      E-Cigarette/Vaping    E-Cigarette Use Never User      E-Cigarette/Vaping Substances    Nicotine No     THC No     CBD No     Flavoring No     Other No     Unknown No      Social History     Tobacco Use    Smoking status: Former Smoker     Packs/day: 1 00     Years: 60 00     Pack years: 60 00     Quit date: 8/31/2017     Years since quitting: 3 5    Smokeless tobacco: Never Used    Tobacco comment: quit in august 2017   Substance Use Topics    Alcohol use: Not Currently     Frequency: Never     Comment: stopped in 2009    Drug use: Not Currently       Review of Systems   Neurological: Positive for weakness  All other systems reviewed and are negative  Physical Exam  Physical Exam  Vitals signs and nursing note reviewed  Constitutional:       General: He is not in acute distress  Appearance: He is well-developed  He is not diaphoretic  HENT:      Head: Normocephalic and atraumatic  Right Ear: External ear normal       Left Ear: External ear normal    Eyes:      General:         Right eye: No discharge  Left eye: No discharge  Pupils: Pupils are equal, round, and reactive to light  Neck:      Musculoskeletal: Normal range of motion and neck supple  Thyroid: No thyromegaly  Trachea: No tracheal deviation  Cardiovascular:      Rate and Rhythm: Normal rate and regular rhythm  Heart sounds: No murmur  Pulmonary:      Effort: Pulmonary effort is normal       Breath sounds: Normal breath sounds  Abdominal:      General: Bowel sounds are normal  There is no distension  Palpations: Abdomen is soft  Tenderness: There is no abdominal tenderness  Musculoskeletal: Normal range of motion  General: No deformity  Comments: 4/5 strength in bilateral lower extremities   Skin:     General: Skin is warm  Capillary Refill: Capillary refill takes less than 2 seconds  Neurological:      Mental Status: He is alert and oriented to person, place, and time  Cranial Nerves: No cranial nerve deficit  Motor: No abnormal muscle tone  Comments: 4/5 strength in bilateral lower extremities  No sensory deficits     Normal 2+ reflexes   Psychiatric:         Behavior: Behavior normal          Vital Signs  ED Triage Vitals [03/02/21 1724]   Temperature Pulse Respirations Blood Pressure SpO2   99 4 °F (37 4 °C) 78 18 (!) 200/93 98 %      Temp Source Heart Rate Source Patient Position - Orthostatic VS BP Location FiO2 (%)   Tympanic Monitor Sitting Right arm --      Pain Score       --           Vitals:    03/02/21 1724 03/02/21 1908 03/02/21 2000   BP: (!) 200/93 (!) 198/85 159/76   Pulse: 78 78 82   Patient Position - Orthostatic VS: Sitting Lying Sitting         Visual Acuity      ED Medications  Medications   sodium chloride 0 9 % bolus 500 mL (0 mL Intravenous Stopped 3/2/21 1945)       Diagnostic Studies  Results Reviewed     Procedure Component Value Units Date/Time    UA w Reflex to Microscopic w Reflex to Culture [[id number]] Collected: 03/02/21 1932    Lab Status: Final result Specimen: Urine, Clean Catch Updated: 03/02/21 1938     Color, UA Light Yellow     Clarity, UA Clear     Specific [place], UA 1 020     pH, UA 8 0     Leukocytes, UA Negative     Nitrite, UA Negative     Protein, UA Negative mg/dl      Glucose, UA Negative mg/dl      Ketones, UA Negative mg/dl      Urobilinogen, UA 0 2 E U /dl      Bilirubin, UA Negative     Blood, UA Negative    COVID19, Influenza A/B, RSV PCR, [facility] [[id number]]  (Normal) Collected: 03/02/21 1743    Lab Status: Final result Specimen: Nares from Nasopharyngeal Swab Updated: 03/02/21 1832     SARS-CoV-2 Negative     INFLUENZA A PCR Negative     INFLUENZA B PCR Negative     RSV PCR Negative    Narrative: This test has been authorized by FDA under an EUA (Emergency Use Assay) for use by authorized laboratories  Clinical caution and judgement should be used with the interpretation of these results with consideration of the clinical impression and other laboratory testing  Testing reported as "Positive" or "Negative" has been proven to be accurate according to standard laboratory validation requirements  All testing is performed with control materials showing appropriate reactivity at standard intervals      TSH [[id number]]  (Normal) Collected: 03/02/21 1741    Lab Status: Final result Specimen: Blood from Arm, Right Updated: 03/02/21 1815     TSH 3RD GENERATON 0 909 uIU/mL     Narrative:      Patients undergoing fluorescein dye angiography may retain small amounts of fluorescein in the body for 48-72 hours post procedure  Samples containing fluorescein can produce falsely depressed TSH values  If the patient had this procedure,a specimen should be resubmitted post fluorescein clearance        Basic metabolic panel [[id number]] Collected: 03/02/21 1741    Lab Status: Final result Specimen: Blood from Arm, Right Updated: 03/02/21 1815     Sodium 136 mmol/L      Potassium 4 3 mmol/L      Chloride 102 mmol/L      CO2 26 mmol/L      ANION GAP 8 mmol/L      BUN 12 mg/dL      Creatinine 1 09 mg/dL      Glucose 115 mg/dL      Calcium 8 7 mg/dL      eGFR 65 ml/min/1 73sq m     Narrative:      [place] guidelines for Chronic Kidney Disease (CKD):     Stage 1 with normal or high GFR (GFR > 90 mL/min/1 73 square meters)    Stage 2 Mild CKD (GFR = 60-89 mL/min/1 73 square meters)    Stage 3A Moderate CKD (GFR = 45-59 mL/min/1 73 square meters)    Stage 3B Moderate CKD (GFR = 30-44 mL/min/1 73 square meters)    Stage 4 Severe CKD (GFR = 15-29 mL/min/1 73 square meters)    Stage 5 End Stage CKD (GFR <15 mL/min/1 73 square meters)  Note: GFR calculation is accurate only with a steady state creatinine    Magnesium [[id number]]  (Normal) Collected: 03/02/21 1741    Lab Status: Final result Specimen: Blood from Arm, Right Updated: 03/02/21 1815     Magnesium 1 7 mg/dL     CK (with reflex to MB) [[id number]]  (Normal) Collected: 03/02/21 1741    Lab Status: Final result Specimen: Blood from Arm, Right Updated: 03/02/21 1815     Total CK 80 U/L     Troponin I [[id number]]  (Normal) Collected: 03/02/21 1741    Lab Status: Final result Specimen: Blood from Arm, Right Updated: 03/02/21 1809     Troponin I <0 02 ng/mL     CBC and differential [[id number]]  (Abnormal) Collected: 03/02/21 1741    Lab Status: Final result Specimen: Blood from Arm, Right Updated: 03/02/21 1755     WBC 7 03 Thousand/uL      RBC 4 35 Million/uL      Hemoglobin 13 6 g/dL      Hematocrit 41 7 %      MCV 96 fL      MCH 31 3 pg MCHC 32 6 g/dL      RDW 13 1 %      MPV 9 1 fL      Platelets 518 Thousands/uL      nRBC 0 /100 WBCs      Neutrophils Relative 67 %      Immat GRANS % 0 %      Lymphocytes Relative 20 %      Monocytes Relative 9 %      Eosinophils Relative 3 %      Basophils Relative 1 %      Neutrophils Absolute 4 72 Thousands/µL      Immature Grans Absolute 0 03 Thousand/uL      Lymphocytes Absolute 1 40 Thousands/µL      Monocytes Absolute 0 62 Thousand/µL      Eosinophils Absolute 0 21 Thousand/µL      Basophils Absolute 0 05 Thousands/µL                  XR chest 1 view portable   ED Interpretation by Keke Chiang MD (03/02 1841)   No acute disease  Procedures  ECG 12 Lead Documentation Only    Date/Time: 3/2/2021 5:34 PM  Performed by: Keke Chiang MD  Authorized by: Keke Chiang MD     Indications / Diagnosis:  General weakness  ECG reviewed by me, the ED Provider: yes    Patient location:  ED  Interpretation:     Interpretation: normal    Rate:     ECG rate:  76    ECG rate assessment: normal    Rhythm:     Rhythm: sinus rhythm    Ectopy:     Ectopy: none    QRS:     QRS axis:  Normal    QRS intervals:  Normal  Conduction:     Conduction: normal    ST segments:     ST segments:  Normal  T waves:     T waves: normal               ED Course                                           MDM  Number of Diagnoses or Management Options  Ambulatory dysfunction: new and requires workup  Weakness: new and requires workup  Diagnosis management comments: Generalized weakness, unable to stand up off of his couch  Has a nonfocal exam at this point  Reports history of the same, worsening over the past week  Labs unremarkable  Patient states he is unsafe at home and is unable to ambulate  He does live by himself  Will need admission for PT/OT/SW and likely placement         Amount and/or Complexity of Data Reviewed  Clinical lab tests: ordered and reviewed  Tests in the radiology section of CPT®: ordered and reviewed  Tests in the medicine section of CPT®: ordered and reviewed  Discuss the patient with other providers: yes    Risk of Complications, Morbidity, and/or Mortality  Presenting problems: high  Diagnostic procedures: moderate  Management options: high    Patient Progress  Patient progress: stable      Disposition  Final diagnoses:   Ambulatory dysfunction   Weakness     Time reflects when diagnosis was documented in both MDM as applicable and the Disposition within this note     Time User Action Codes Description Comment    3/2/2021  5:56 PM Release User, Automatic Add [R55] Syncope     3/2/2021  5:56 PM Release User, Automatic Add [J96 11] Chronic respiratory failure with hypoxia ([facility] [address] )     3/2/2021  5:56 PM Release User, Automatic Add [R26 2] Ambulatory dysfunction     3/2/2021  7:59 PM Blima Le [R55] Syncope     3/2/2021  7:59 PM Adelaida Doing Modify [J96 11] Chronic respiratory failure with hypoxia ([facility] [address] )     3/2/2021  7:59 PM Adelaida Doing Modify [R26 2] Ambulatory dysfunction     3/2/2021  7:59 PM Adelaida Doing Add [R53 1] Weakness     3/2/2021  7:59 PM Adelaida Doing Modify [R26 2] Ambulatory dysfunction       ED Disposition     ED Disposition Condition Date/Time Comment    Admit Stable Tue Mar 2, 2021  7:59 PM Case was discussed with Dr Paola Fu and the patient's admission status was agreed to be Admission Status: inpatient status to the service of Dr Paola Fu   Follow-up Information    None         Patient's Medications   Discharge Prescriptions    No medications on file     No discharge procedures on file      PDMP Review       Value Time User    PDMP Reviewed  Yes 2/9/2021 10:51 AM Pawan Lainez MD          ED Provider  Electronically Signed by           Joe Wright MD  03/02/21 2029       Joe Wright MD  03/02/21 2030
